# Patient Record
Sex: FEMALE | Race: BLACK OR AFRICAN AMERICAN | NOT HISPANIC OR LATINO | Employment: OTHER | URBAN - METROPOLITAN AREA
[De-identification: names, ages, dates, MRNs, and addresses within clinical notes are randomized per-mention and may not be internally consistent; named-entity substitution may affect disease eponyms.]

---

## 2017-01-03 ENCOUNTER — TELEPHONE (OUTPATIENT)
Dept: INTERNAL MEDICINE | Facility: CLINIC | Age: 57
End: 2017-01-03

## 2017-01-05 ENCOUNTER — TELEPHONE (OUTPATIENT)
Dept: INTERNAL MEDICINE | Facility: CLINIC | Age: 57
End: 2017-01-05

## 2017-01-05 NOTE — TELEPHONE ENCOUNTER
Left  for Lucy with [facility]  requesting return call.  Also tried calling Cecy-who called on 1/3, no answer.

## 2017-01-05 NOTE — TELEPHONE ENCOUNTER
Called & spoke with Lucy in [facility].  She said per the physical therapists phone notes on 1/4, the patient was not progressing with therapy and had hit a plateau.  She says when this happens, she no longer qualifies to stay to do PT in home.  She is unaware of any OT recommendations.

## 2017-01-05 NOTE — TELEPHONE ENCOUNTER
Araceli, I got a call from pt's daughter that [facility] wants to D/C home health but they never started OT.  Could you call them and check on this.  Thanks

## 2017-01-06 ENCOUNTER — TELEPHONE (OUTPATIENT)
Dept: INTERNAL MEDICINE | Facility: CLINIC | Age: 57
End: 2017-01-06

## 2017-01-06 DIAGNOSIS — E11.9 TYPE II OR UNSPECIFIED TYPE DIABETES MELLITUS WITHOUT MENTION OF COMPLICATION, NOT STATED AS UNCONTROLLED: ICD-10-CM

## 2017-01-06 DIAGNOSIS — M51.37 DEGENERATION OF LUMBAR OR LUMBOSACRAL INTERVERTEBRAL DISC: ICD-10-CM

## 2017-01-06 RX ORDER — GABAPENTIN 100 MG/1
CAPSULE ORAL
Qty: 360 CAPSULE | Refills: 3 | Status: SHIPPED | OUTPATIENT
Start: 2017-01-06 | End: 2017-06-23 | Stop reason: SDUPTHER

## 2017-01-06 NOTE — TELEPHONE ENCOUNTER
----- Message from Alejandro Meehan sent at 1/6/2017  9:35 AM CST -----  Contact: Self/[phone]  Type: Rx    Name of medication(s): gabapentin (NEURONTIN) 100 MG capsule and hydrALAZINE (APRESOLINE) 100 MG tablet    Is this a refill? New rx?refill    Who prescribed medication?Dr. Muniz    Pharmacy Name, Phone, & Location:[facility]    Comments:Patient is calling to request a refill on medications above. Please call and advise.    Thank you!

## 2017-01-06 NOTE — TELEPHONE ENCOUNTER
Spoke with pt's daughter, Reina, who noted that her Mom's glucose was high at 431(On Levemir 12 BID).  I recommended she take 10 units of novalog which she had at home and increase her Levemir to 18 units in the AM and 14 units in the PM. 'Will ask HH to re-evaluate.  Aarceli, please call [facility] to see pt ie Diabetes and follow glucoses/accuchecks more closely.Thanks

## 2017-01-11 ENCOUNTER — HOME CARE VISIT (OUTPATIENT)
Dept: NEUROLOGY | Facility: HOSPITAL | Age: 57
End: 2017-01-11

## 2017-01-23 ENCOUNTER — TELEPHONE (OUTPATIENT)
Dept: INTERNAL MEDICINE | Facility: CLINIC | Age: 57
End: 2017-01-23

## 2017-01-23 NOTE — TELEPHONE ENCOUNTER
Patient is diabetic patient's left big toe is turning black, Reina is calling to speak with someone regarding her mom's condition

## 2017-01-23 NOTE — TELEPHONE ENCOUNTER
----- Message from Hemalatha Morris sent at 1/23/2017 12:36 PM CST -----  Contact: josee Huang [phone]  Patient is diabetic patient's left big toe is turning black, Reina is calling to speak with someone regarding her mom's condition, please call patient back and advise.    Thank you

## 2017-01-24 ENCOUNTER — PATIENT MESSAGE (OUTPATIENT)
Dept: PHYSICAL MEDICINE AND REHAB | Facility: CLINIC | Age: 57
End: 2017-01-24

## 2017-01-25 ENCOUNTER — OFFICE VISIT (OUTPATIENT)
Dept: INTERNAL MEDICINE | Facility: CLINIC | Age: 57
End: 2017-01-25
Payer: MEDICARE

## 2017-01-25 VITALS
TEMPERATURE: 98 F | HEART RATE: 66 BPM | SYSTOLIC BLOOD PRESSURE: 130 MMHG | OXYGEN SATURATION: 98 % | DIASTOLIC BLOOD PRESSURE: 70 MMHG | HEIGHT: 64 IN

## 2017-01-25 DIAGNOSIS — E11.9 TYPE II OR UNSPECIFIED TYPE DIABETES MELLITUS WITHOUT MENTION OF COMPLICATION, NOT STATED AS UNCONTROLLED: ICD-10-CM

## 2017-01-25 DIAGNOSIS — L60.0 INGROWING NAIL: Primary | ICD-10-CM

## 2017-01-25 PROCEDURE — 99499 UNLISTED E&M SERVICE: CPT | Mod: S$GLB,,, | Performed by: INTERNAL MEDICINE

## 2017-01-25 PROCEDURE — 3066F NEPHROPATHY DOC TX: CPT | Mod: S$GLB,,, | Performed by: INTERNAL MEDICINE

## 2017-01-25 PROCEDURE — 1159F MED LIST DOCD IN RCRD: CPT | Mod: S$GLB,,, | Performed by: INTERNAL MEDICINE

## 2017-01-25 PROCEDURE — 3078F DIAST BP <80 MM HG: CPT | Mod: S$GLB,,, | Performed by: INTERNAL MEDICINE

## 2017-01-25 PROCEDURE — 99999 PR PBB SHADOW E&M-EST. PATIENT-LVL V: CPT | Mod: PBBFAC,,, | Performed by: INTERNAL MEDICINE

## 2017-01-25 PROCEDURE — 3044F HG A1C LEVEL LT 7.0%: CPT | Mod: S$GLB,,, | Performed by: INTERNAL MEDICINE

## 2017-01-25 PROCEDURE — 3075F SYST BP GE 130 - 139MM HG: CPT | Mod: S$GLB,,, | Performed by: INTERNAL MEDICINE

## 2017-01-25 PROCEDURE — 99214 OFFICE O/P EST MOD 30 MIN: CPT | Mod: S$GLB,,, | Performed by: INTERNAL MEDICINE

## 2017-01-25 RX ORDER — UREA 200 MG/G
CREAM TOPICAL
Qty: 113 G | Refills: 6 | Status: ON HOLD | OUTPATIENT
Start: 2017-01-25 | End: 2017-06-22 | Stop reason: HOSPADM

## 2017-01-25 NOTE — MR AVS SNAPSHOT
[facility] [address]  [address]  [address]  [facility] [address]  Phone: [phone]                  Lucy Perez   2017 11:30 AM   Office Visit    Description:  Female : 1960   Provider:  Beverly Muniz MD   Department:  [facility] [address]           Reason for Visit     Toe Pain           Diagnoses this Visit        Comments    Ingrowing nail    -  Primary     Type II or unspecified type diabetes mellitus without mention of complication, not stated as uncontrolled                To Do List           Future Appointments        Provider Department Dept Phone    2017 1:15 PM Eleni Muñoz DPM [facility] - Podiatry [phone]    2/10/2017 10:00 AM Pola Galloway MD [facility] Physical Med & Rehab [phone]    2017 12:00 PM Beverly Muniz MD [facility] [address] [phone]      Goals (5 Years of Data)     None       These Medications        Disp Refills Start End    urea 20 % Crea 113 g 6 2017     Apply cream to feet daily for dryness/diabetic health    Pharmacy: [facility] [address] [place] [address] #: [phone]         [facility] On Call     [facility] On Call Nurse Care Line -  Assistance  Registered nurses in the [facility] On Call Center provide clinical advisement, health education, appointment booking, and other advisory services.  Call for this free service at [phone].             Medications           Message regarding Medications     Verify the changes and/or additions to your medication regime listed below are the same as discussed with your clinician today.  If any of these changes or additions are incorrect, please notify your healthcare provider.        START taking these NEW medications        Refills    urea 20 % Crea 6    Sig: Apply cream to feet daily for dryness/diabetic health    Class: Normal           Verify that the below list of  medications is an accurate representation of the medications you are currently taking.  If none reported, the list may be blank. If incorrect, please contact your healthcare provider. Carry this list with you in case of emergency.           Current Medications     albuterol (PROVENTIL) 2.5 mg /3 mL (0.083 %) nebulizer solution Take 3 mLs (2.5 mg total) by nebulization every 6 (six) hours as needed for Wheezing.    albuterol 90 mcg/actuation inhaler Inhale 2 puffs into the lungs every 6 (six) hours as needed for Wheezing.    alprazolam (XANAX) 0.5 MG tablet Take 1 tablet (0.5 mg total) by mouth as directed. 1/2 to 1 tab Q8 hours as needed for anxiety    amlodipine (NORVASC) 10 MG tablet Take 1 tablet (10 mg total) by mouth once daily.    aspirin (ECOTRIN) 81 MG EC tablet Take 81 mg by mouth once daily.      atorvastatin (LIPITOR) 40 MG tablet Take 1 tablet (40 mg total) by mouth once daily. For Cholesterol    blood sugar diagnostic (ACCU-CHEK SMARTVIEW TEST STRIP) Strp 1 strip by Misc.(Non-Drug; Combo Route) route 2 (two) times daily.    carbamazepine (TEGRETOL) 200 mg tablet 1 tablet (200 mg total) by Per G Tube route 2 (two) times daily.    colchicine 0.6 mg tablet 1 tab daily as needed for gout flare    dantrolene (DANTRIUM) 25 MG Cap Take 1 capsule (25 mg total) by mouth 3 (three) times daily.    famotidine (PEPCID) 20 MG tablet Take 1 tablet (20 mg total) by mouth 2 (two) times daily as needed for Heartburn.    fluoxetine (PROZAC) 20 MG tablet Take 1 tablet (20 mg total) by mouth once daily.    furosemide (LASIX) 40 MG tablet Take 1 tablet (40 mg total) by mouth once daily.    gabapentin (NEURONTIN) 100 MG capsule 1 in AM; 3 caps at Bedtime    insulin detemir (LEVEMIR FLEXTOUCH) 100 unit/mL (3 mL) SubQ InPn pen 18 units in AM and 14 units in the PM for Diabetes    labetalol (NORMODYNE) 100 MG tablet 5 tablets (500 mg total) by Per G Tube route every 8 (eight) hours.    levetiracetam (KEPPRA) 500 MG Tab Take 1  "tablet (500 mg total) by mouth 2 (two) times daily.    multivitamin with iron Tab 1/day    nut.tx.gluc.intol,lac-free,soy (GLUCERNA 1.5 NOAH) Liqd 1.5 cans in AM, 1 can at Noon , 1.5cans at Dinner, and 1 can before Bed/Total 5 cans daily    sodium polystyrene (KAYEXALATE) 15 gram/60 mL Susp Take 60 mLs (15 g total) by mouth once daily. 60ml(15gm)/PEG now and repeat Q6 hours x 3 doses    urea 20 % Crea Apply cream to feet daily for dryness/diabetic health           Clinical Reference Information           Vital Signs - Last Recorded  Most recent update: 1/25/2017 12:14 PM by Anaid Gudino MA    BP Pulse Temp Ht SpO2       120/60 66 97.6 °F (36.4 °C) (Oral) 5' 4" (1.626 m) 98%       Blood Pressure          Most Recent Value    BP  120/60      Allergies as of 1/25/2017     Vicodin [Hydrocodone-acetaminophen]      Immunizations Administered on Date of Encounter - 1/25/2017     None      Orders Placed During Today's Visit      Normal Orders This Visit    Ambulatory referral to Podiatry       "

## 2017-01-26 NOTE — PROGRESS NOTES
SUBJECTIVE:  Ms. Perez is a very sweet 56-year-old female, known to myself,   who presents with her daughter, Reina, for urgent care type appointment.    CHIEF COMPLAINT:  Dark discoloration of the left great toe.    HISTORY OF PRESENT ILLNESS:  The patient presents with daughter, Reina, for   evaluation of the above.  She notes she is nervous because she has noticed that   her mom's left great toe has become darker and had been drier.  She is worried   about it, possibly being a circulatory issue versus something else.  Mother has   not really complained of much in the way of pain.  She has no trauma.  Actually,   she notes that her mom is doing much better.  Lucy is much more verbal today.    She is seated up in a straight back high back wheelchair with neck support, was   able to transfer here with a taxicab for transportation one that had a ramp.    She notes that she is working on transportation for the future.  She was   supposed to see Dr. Galloway today, but he had to cancel that appointment and her   appointment with him has been rescheduled.  Daughter, Reina, notes that  her   mom is doing much better now using the PEG tube only for medications.  She is   actually eating orally, tolerating this fine, no choking, no coughing.  Reina   notes that Accu-Cheks are much improved, usually less than 170, the lowest   Accu-Chek was noted to be 80.  When transportation has been set up, Reina is   hoping to start outpatient physical therapy.  She notes her mom is anxious to   try and ambulate more.  [facility].    PAST MEDICAL, SURGICAL, SOCIAL HISTORY:  Please see as thoroughly stated in EPIC   chart, which has been reviewed.    PHYSICAL EXAMINATION:  VITAL SIGNS:  Blood pressure 120/60, pulse 66, temperature 97.6, pulse ox 98%,   Accu-Chek today is 228, although the patient's daughter, Reina, notes she has    not taken her insulin or eating yet.  Recheck blood pressure per MD is 130/70.  GENERAL:  The patient is seated in a wheelchair.  Seems to be much more   talkative today and responsive than during last visit.  HEART:  Regular rate and rhythm.  LUNGS:  Show some fine bibasilar atelectatic crackles, but otherwise clear.  EXTREMITIES:  Left foot showing some hyperpigmented areas around the dorsal foot   and great toe, which are consistent with dyshidrosis.  No evidence of gangrene.    Foot is warm, has a strong 2+ DP pulse.  Nails do show some increased growth.    There is also, what appears to be, an ingrowing nail of the left great toe.  No   erythema or tenderness.  Superficial sensation is markedly decreased in the   left foot and the right foot.  Right foot is warm with a normal DP pulse as   well.  No evidence of vascular compromise whatsoever.    ASSESSMENT AND PLAN:  1.  Ingrowing nail, left podagra.  a.  Refer to Podiatry for further evaluation and intervention.  2.  Diabetes mellitus type 2, insulin dependent, and under improved control.  a.  Continue with the patient's present Levemir at 18 units in the a.m., 14   units in the p.m.  b.  I have given prescription for urea 20% cream to be applied to feet daily for   moisturizing purposes.  c.  Refer to podiatry for further evaluation and treatment and nail care.  3.  Essential hypertension, under excellent control on present therapy, which   includes Normodyne 500 mg t.i.d., Lasix 40 mg one a day, and amlodipine 10 mg   daily.    ADDENDUM:  Plan to see the patient back as was prior scheduled or see sooner if   needed.      FMS/HN  dd: 01/25/2017 18:31:08 (CST)  td: 01/26/2017 18:00:29 (CST)  Doc ID   #[id number]  Job ID #[id number]    CC:     This office note has been dictated.

## 2017-01-31 DIAGNOSIS — I61.9 HEMORRHAGIC CEREBROVASCULAR ACCIDENT (CVA): ICD-10-CM

## 2017-02-01 RX ORDER — LEVETIRACETAM 500 MG/1
500 TABLET ORAL 2 TIMES DAILY
Qty: 60 TABLET | Refills: 6 | Status: ON HOLD | OUTPATIENT
Start: 2017-02-01 | End: 2017-06-22

## 2017-02-01 NOTE — TELEPHONE ENCOUNTER
----- Message from Mickey Choudhury sent at 2/1/2017 12:01 PM CST -----  Contact: Reina quiros [phone]  RX request - refill or new RX.  Is this a refill or new RX:  Refill   RX name and strength: levetiracetam (KEPPRA) 500 MG   Directions:   Is this a 30 day or 90 day RX:    Pharmacy name and phone #: [facility] [phone]  Comments:

## 2017-02-02 RX ORDER — HYDRALAZINE HYDROCHLORIDE 50 MG/1
TABLET, FILM COATED ORAL
Qty: 360 TABLET | Refills: 2 | Status: SHIPPED | OUTPATIENT
Start: 2017-02-02 | End: 2017-02-22

## 2017-02-03 ENCOUNTER — TELEPHONE (OUTPATIENT)
Dept: INTERNAL MEDICINE | Facility: CLINIC | Age: 57
End: 2017-02-03

## 2017-02-03 RX ORDER — HYDRALAZINE HYDROCHLORIDE 100 MG/1
TABLET, FILM COATED ORAL
Qty: 270 TABLET | Refills: 11 | Status: SHIPPED | OUTPATIENT
Start: 2017-02-03 | End: 2017-02-22 | Stop reason: SDUPTHER

## 2017-02-03 NOTE — TELEPHONE ENCOUNTER
----- Message from Vesna Johnson sent at 2/3/2017  1:26 PM CST -----  Contact: Reina/ Daughter/ [phone]   Reina is calling to let the doctor know on Wednesday the pt blood sugar was 63 and today it was 69 in the morning. Pt has a wound on the buttock from it itching really bad. Please call and advise     Thank you

## 2017-02-07 ENCOUNTER — TELEPHONE (OUTPATIENT)
Dept: INTERNAL MEDICINE | Facility: CLINIC | Age: 57
End: 2017-02-07

## 2017-02-07 DIAGNOSIS — E11.9 TYPE II OR UNSPECIFIED TYPE DIABETES MELLITUS WITHOUT MENTION OF COMPLICATION, NOT STATED AS UNCONTROLLED: ICD-10-CM

## 2017-02-07 NOTE — TELEPHONE ENCOUNTER
----- Message from Shabnam Reeder sent at 2/7/2017  1:11 PM CST -----  Contact: Daughter/Reina/[phone] cell /[phone]  Pt's daughter said that she is calling in regards to pt is at a   Pre-op appointment and the anesthesiologist wants to know if it is okay for pt to stop taking her asprin prior to her surgery pt is scheduled to have surgery on 2/9/2016 and pt needs the okay to stop today. Please call and advise          Thank you

## 2017-02-07 NOTE — TELEPHONE ENCOUNTER
Spoke with Reina, who notes that her Mom's AM accuchecks are 50-60 and 110-130 in PM. I've recommended the Levemir be decreased to 16units in AM and 10units in the AM. She is having the cranial skull flap replaced 2/9 per [facility] Neurosurgery; there is no contraindication to stopping ASA pre-operatively.  She notes that the buttucks rash has resolved.

## 2017-02-16 ENCOUNTER — TELEPHONE (OUTPATIENT)
Dept: INTERNAL MEDICINE | Facility: CLINIC | Age: 57
End: 2017-02-16

## 2017-02-16 NOTE — TELEPHONE ENCOUNTER
Called segundo with [facility] back. Malissa answered she tried to explain to me what exactly was going on the pt. Apparently pt had a skull surgery and was in the hospital for more than 48 hours and according to [facility] they are saying that when a pt is admitted and will be in the hospital [place] 24 hours they are supposed to be notified to pt the pt on hold so they can resume home health. But that did not happen in this case. pts  is stating that they told them that because she was in the hospital longer than 48 hours that the orders for home health should be re submitted. He also stated that some one told him that an RN would be coming to their house jimenez 2/17/17. But according to [facility] they were not even aware of the skull surgery. But that it was at a [facility] facility and the pt was not sent home with any directions or steps to take following. [facility] states that they think the pt should be seen by Dr Porras and then see what are the next steps.

## 2017-02-16 NOTE — TELEPHONE ENCOUNTER
----- Message from Rocco Strong MA sent at 2/16/2017  2:24 PM CST -----  Contact: Lucy spence/[phone]  Please advise that the Pt was released from [facility] without any information and Lucy is calling to se if the Dr wants the pt to resume Home Health. Please call. Thanks!

## 2017-02-16 NOTE — TELEPHONE ENCOUNTER
Called pt daughter she said that it is okay to wait for the order for home health until after we see her mother on 2/22/17. She did state that [facility] had stated that they wanted her mother to start physical therapy. Pt daughter was unaware of who would have to place those orders. She also said that she would come by jimenez and drop off a release of information request form from [facility] so that Dr Muniz can see what all happened.

## 2017-02-22 ENCOUNTER — LAB VISIT (OUTPATIENT)
Dept: LAB | Facility: HOSPITAL | Age: 57
End: 2017-02-22
Attending: INTERNAL MEDICINE
Payer: MEDICARE

## 2017-02-22 ENCOUNTER — OFFICE VISIT (OUTPATIENT)
Dept: INTERNAL MEDICINE | Facility: CLINIC | Age: 57
End: 2017-02-22
Payer: MEDICARE

## 2017-02-22 ENCOUNTER — DOCUMENTATION ONLY (OUTPATIENT)
Dept: INTERNAL MEDICINE | Facility: CLINIC | Age: 57
End: 2017-02-22

## 2017-02-22 VITALS
DIASTOLIC BLOOD PRESSURE: 88 MMHG | HEART RATE: 60 BPM | OXYGEN SATURATION: 98 % | WEIGHT: 225 LBS | SYSTOLIC BLOOD PRESSURE: 144 MMHG | HEIGHT: 64 IN | BODY MASS INDEX: 38.41 KG/M2

## 2017-02-22 DIAGNOSIS — K29.00 ACUTE SUPERFICIAL GASTRITIS WITHOUT HEMORRHAGE: ICD-10-CM

## 2017-02-22 DIAGNOSIS — D46.4 ANEMIA, REFRACTORY: ICD-10-CM

## 2017-02-22 DIAGNOSIS — Z79.4 TYPE 2 DIABETES MELLITUS WITH STAGE 3 CHRONIC KIDNEY DISEASE, WITH LONG-TERM CURRENT USE OF INSULIN: ICD-10-CM

## 2017-02-22 DIAGNOSIS — I61.9 CEREBROVASCULAR ACCIDENT (CVA) WITH INTRACRANIAL HEMORRHAGE: ICD-10-CM

## 2017-02-22 DIAGNOSIS — E11.9 TYPE II OR UNSPECIFIED TYPE DIABETES MELLITUS WITHOUT MENTION OF COMPLICATION, NOT STATED AS UNCONTROLLED: ICD-10-CM

## 2017-02-22 DIAGNOSIS — N18.30 TYPE 2 DIABETES MELLITUS WITH STAGE 3 CHRONIC KIDNEY DISEASE, WITH LONG-TERM CURRENT USE OF INSULIN: ICD-10-CM

## 2017-02-22 DIAGNOSIS — D64.9 ANEMIA, UNSPECIFIED: ICD-10-CM

## 2017-02-22 DIAGNOSIS — E11.22 TYPE 2 DIABETES MELLITUS WITH STAGE 3 CHRONIC KIDNEY DISEASE, WITH LONG-TERM CURRENT USE OF INSULIN: ICD-10-CM

## 2017-02-22 DIAGNOSIS — D64.9 ANEMIA, UNSPECIFIED: Primary | ICD-10-CM

## 2017-02-22 DIAGNOSIS — I10 ESSENTIAL HYPERTENSION: ICD-10-CM

## 2017-02-22 LAB
ALBUMIN SERPL BCP-MCNC: 3.3 G/DL
ALP SERPL-CCNC: 98 U/L
ALT SERPL W/O P-5'-P-CCNC: 8 U/L
ANION GAP SERPL CALC-SCNC: 11 MMOL/L
AST SERPL-CCNC: 21 U/L
BASOPHILS # BLD AUTO: 0.04 K/UL
BASOPHILS NFR BLD: 0.4 %
BILIRUB SERPL-MCNC: 0.2 MG/DL
BUN SERPL-MCNC: 49 MG/DL
CALCIUM SERPL-MCNC: 9.5 MG/DL
CHLORIDE SERPL-SCNC: 103 MMOL/L
CHOLEST/HDLC SERPL: 4.1 {RATIO}
CO2 SERPL-SCNC: 23 MMOL/L
CREAT SERPL-MCNC: 1.7 MG/DL
DIFFERENTIAL METHOD: ABNORMAL
EOSINOPHIL # BLD AUTO: 0.3 K/UL
EOSINOPHIL NFR BLD: 3.5 %
ERYTHROCYTE [DISTWIDTH] IN BLOOD BY AUTOMATED COUNT: 14.9 %
EST. GFR  (AFRICAN AMERICAN): 37.8 ML/MIN/1.73 M^2
EST. GFR  (NON AFRICAN AMERICAN): 32.8 ML/MIN/1.73 M^2
FERRITIN SERPL-MCNC: 358 NG/ML
GLUCOSE SERPL-MCNC: 160 MG/DL
HCT VFR BLD AUTO: 32 %
HDL/CHOLESTEROL RATIO: 24.5 %
HDLC SERPL-MCNC: 204 MG/DL
HDLC SERPL-MCNC: 50 MG/DL
HGB BLD-MCNC: 9.9 G/DL
LDLC SERPL CALC-MCNC: 123.2 MG/DL
LYMPHOCYTES # BLD AUTO: 1.1 K/UL
LYMPHOCYTES NFR BLD: 11.6 %
MCH RBC QN AUTO: 29.6 PG
MCHC RBC AUTO-ENTMCNC: 30.9 %
MCV RBC AUTO: 96 FL
MONOCYTES # BLD AUTO: 0.6 K/UL
MONOCYTES NFR BLD: 6.3 %
NEUTROPHILS # BLD AUTO: 7.4 K/UL
NEUTROPHILS NFR BLD: 78.2 %
NONHDLC SERPL-MCNC: 154 MG/DL
PLATELET # BLD AUTO: 335 K/UL
PMV BLD AUTO: 8.7 FL
POTASSIUM SERPL-SCNC: 4.9 MMOL/L
PROT SERPL-MCNC: 7.3 G/DL
RBC # BLD AUTO: 3.34 M/UL
SODIUM SERPL-SCNC: 137 MMOL/L
TRIGL SERPL-MCNC: 154 MG/DL
WBC # BLD AUTO: 9.43 K/UL

## 2017-02-22 PROCEDURE — 99499 UNLISTED E&M SERVICE: CPT | Mod: S$GLB,,, | Performed by: INTERNAL MEDICINE

## 2017-02-22 PROCEDURE — 3079F DIAST BP 80-89 MM HG: CPT | Mod: S$GLB,,, | Performed by: INTERNAL MEDICINE

## 2017-02-22 PROCEDURE — 1160F RVW MEDS BY RX/DR IN RCRD: CPT | Mod: S$GLB,,, | Performed by: INTERNAL MEDICINE

## 2017-02-22 PROCEDURE — 36415 COLL VENOUS BLD VENIPUNCTURE: CPT | Mod: PO

## 2017-02-22 PROCEDURE — 99214 OFFICE O/P EST MOD 30 MIN: CPT | Mod: S$GLB,,, | Performed by: INTERNAL MEDICINE

## 2017-02-22 PROCEDURE — 99999 PR PBB SHADOW E&M-EST. PATIENT-LVL V: CPT | Mod: PBBFAC,,, | Performed by: INTERNAL MEDICINE

## 2017-02-22 PROCEDURE — 80061 LIPID PANEL: CPT

## 2017-02-22 PROCEDURE — 82728 ASSAY OF FERRITIN: CPT

## 2017-02-22 PROCEDURE — 80053 COMPREHEN METABOLIC PANEL: CPT

## 2017-02-22 PROCEDURE — 83036 HEMOGLOBIN GLYCOSYLATED A1C: CPT

## 2017-02-22 PROCEDURE — 3066F NEPHROPATHY DOC TX: CPT | Mod: S$GLB,,, | Performed by: INTERNAL MEDICINE

## 2017-02-22 PROCEDURE — 3077F SYST BP >= 140 MM HG: CPT | Mod: S$GLB,,, | Performed by: INTERNAL MEDICINE

## 2017-02-22 PROCEDURE — 85025 COMPLETE CBC W/AUTO DIFF WBC: CPT | Mod: PO

## 2017-02-22 PROCEDURE — 3044F HG A1C LEVEL LT 7.0%: CPT | Mod: S$GLB,,, | Performed by: INTERNAL MEDICINE

## 2017-02-22 RX ORDER — PANTOPRAZOLE SODIUM 40 MG/1
40 TABLET, DELAYED RELEASE ORAL DAILY
Qty: 30 TABLET | Refills: 0 | Status: ON HOLD
Start: 2017-02-22 | End: 2017-06-22 | Stop reason: HOSPADM

## 2017-02-22 RX ORDER — HYDRALAZINE HYDROCHLORIDE 100 MG/1
100 TABLET, FILM COATED ORAL 3 TIMES DAILY
Qty: 270 TABLET | Refills: 3 | Status: SHIPPED | OUTPATIENT
Start: 2017-02-22 | End: 2018-06-18 | Stop reason: SDUPTHER

## 2017-02-22 NOTE — PROGRESS NOTES
Records [facility]/9--2/13:  Pt was admitted for autologous cranioplasty, which she did well with. She was noted to have drop in H/H and received 2 units pRBCs as well as a GI work up. EGD/C-scope-showed +gastritis/rest was negative.  Pt was sent out on Pantoprozole 40mg BID.  In light of pt being on Pepcid daily, I have recommended ok to take Pantoprozole 40mg Qday -BID not necessary now.

## 2017-02-22 NOTE — PROGRESS NOTES
REASON FOR VISIT:  Ms. Perez is a very sweet 56-year-old female, known to   myself, who presents with her daughter, Reina, and her  for a scheduled   followup of diabetes.    HISTORY OF PRESENT ILLNESS:  Ms. Perez is actually status post admission to   [facility] on February 9th, for autologous cranioplasty.  She has   done well with the procedure.  She unfortunately was noted to have a decrease in   her hematocrit postoperatively and was transfused with 2 units of packed cells   while in the NICU.  GI was consulted.  She had an upper and lower endoscopy,   i.e., an EGD and colonoscopy to workup the anemia, which did show positive   gastritis in the stomach.  No other etiologies for the bleeding.  The patient   was started on pantoprazole 40 mg b.i.d.  She was already on Pepcid 20 mg daily,   which was continued.  [facility] was not restarted after arriving   home.  Daughter and  requested that this be reinitiated, which I am happy   to do.  They note her Accu-Cheks are doing much better.  She does occasionally   have an Accu-Chek as low as 80 in the morning and on a few episodes, they have   skipped the morning Levemir concerned that this would cause a worsening episode   of hypoglycemia.  During our last visit, we actually changed her Levemir to 16   units in the a.m. and 10 in the p.m.; however, she has been in the hospital for   about a week of this period of time.  Daughter, Reina, notes that the   Accu-Cheks of 80 in the morning are not daily, but on occasion.  No other low   Accu-Cheks through the day.  Most Accu-Cheks are running in the 110 to 130 area.    On one of the days when she had a low a.m. glucose of 80 and skipped the a.m.   Levemir, she did have a later Accu-Chek of 198.  Reina notes she still needs   to schedule a followup to Dr. Galloway, her Physical Medicine Rehabilitation   physician and Podiatry.  She had to cancel these appointments due to  her   admission.  She otherwise only needs refills on her hydralazine.  The rest of   medications are doing fine.    PAST MEDICAL, SURGICAL AND SOCIAL HISTORY:  Please see as thoroughly stated in   EPIC chart, which has been reviewed.    PHYSICAL EXAMINATION:  VITAL SIGNS:  Weight is 225 pounds, blood pressure is 144/88 and pulse is 60.  GENERAL:  The patient is seated in her high-back wheelchair, alert, appropriate,   responsive, although response is a little delayed.  HEART:  Regular rate and rhythm without arrhythmias.  LUNGS:  Clear bilaterally.  ABDOMEN:  Positive bowel sounds, soft and nontender.  EXTREMITIES:  She does have left upper and lower extremity mild swelling and   discomfort with movement with motor strength nonexistent of the left.  The   patient does have fairly good strength in the right upper extremity and right   lower extremity.  Extremities are negative for any significant pretibial edema.    Bilateral feet show ingrowing nails as had been seen prior to now at the   podagra, but no other abnormalities and no calluses, ulcerations or breakages in   the skin.    ASSESSMENT AND PLAN:  1.  Diabetes mellitus type 2, insulin-dependent with occasional low a.m.   Accu-Cheks.  A.  I have recommended if Accu-Chek in the a.m. is 80 or below, okay to give   half of the a.m. dose of Levemir, which normally is 16 units.  B.  Otherwise, recommend the patient continue on 16 units of Levemir in the a.m.   and 10 units of Levemir in the p.m.  C.  We will order lab work today to include hemoglobin A1c, chemistry and lipid   panel with phone review.  D.  Return to clinic in 3 to 4 months for followup or see sooner if needed.  2.  Essential hypertension with blood pressure acceptable on present therapy,   which includes labetalol 500 mg t.i.d., hydralazine 100 mg t.i.d., furosemide 40   mg one a day and amlodipine 10 mg one a day.  3.  Chronic anemia with acute worsening.  I feel this is more likely    multifactorial and then acutely worsened by gastritis.  A.  Recommend the patient for now continue on the Pepcid 20 mg daily and   pantoprazole 40 mg 1 a day.  B.  Recommend repeat CBC and ferritin level today with phone review.  4.  Chronic renal insufficiency.  A.  Check BMP with phone review.  5.  Status post hemorrhagic CVA with left-sided hemiparesis.  A.  We will reinitiate home health with skilled nursing evaluation, diabetic   following and physical therapy.  6.  Ingrowing toenails.  A.  I have encouraged daughter Reina to make appointment with Podiatry.  7.  Health maintenance.  A.  Followup appointment to Dr. Galloway in Physical Medicine Rehab to be   scheduled.  B.  Phone review after lab work and schedule 3 to 4 month followup at that point   or see sooner if needed.      FMS/HN  dd: 02/22/2017 16:24:23 (CST)  td: 02/23/2017 08:12:25 (CST)  Doc ID   #[id number]  Job ID #[id number]    CC:     This office note has been dictated.

## 2017-02-22 NOTE — MR AVS SNAPSHOT
[facility] [address]  [address]  [address]  [facility] [address]  Phone: [phone]                  Lucy Perez   2017 12:00 PM   Office Visit    Description:  Female : 1960   Provider:  Beverly Muniz MD   Department:  [facility] [address]           Reason for Visit     Follow-up           Diagnoses this Visit        Comments    Anemia, unspecified    -  Primary     Type 2 diabetes mellitus with stage 3 chronic kidney disease, with long-term current use of insulin         Essential hypertension         Cerebrovascular accident (CVA) with intracranial hemorrhage         Anemia, refractory                To Do List           Goals (5 Years of Data)     None      Follow-Up and Disposition     Return in about 4 months (around 2017).       These Medications        Disp Refills Start End    hydrALAZINE (APRESOLINE) 100 MG tablet 270 tablet 3 2017     Take 1 tablet (100 mg total) by mouth 3 (three) times daily. - Oral    Pharmacy: [facility] - [address] Ph #: [phone]         [facility] On Call     [facility] On Call Nurse Care Line -  Assistance  Registered nurses in the [facility] On Call Center provide clinical advisement, health education, appointment booking, and other advisory services.  Call for this free service at [phone].             Medications           Message regarding Medications     Verify the changes and/or additions to your medication regime listed below are the same as discussed with your clinician today.  If any of these changes or additions are incorrect, please notify your healthcare provider.        CHANGE how you are taking these medications     Start Taking Instead of    hydrALAZINE (APRESOLINE) 100 MG tablet hydrALAZINE (APRESOLINE) 100 MG tablet    Dosage:  Take 1 tablet (100 mg total) by mouth 3 (three) times daily. Dosage:  TAKE 1 TABLET THREE  TIMES DAILY    Reason for Change:  Reorder            Verify that the below list of medications is an accurate representation of the medications you are currently taking.  If none reported, the list may be blank. If incorrect, please contact your healthcare provider. Carry this list with you in case of emergency.           Current Medications     albuterol (PROVENTIL) 2.5 mg /3 mL (0.083 %) nebulizer solution Take 3 mLs (2.5 mg total) by nebulization every 6 (six) hours as needed for Wheezing.    albuterol 90 mcg/actuation inhaler Inhale 2 puffs into the lungs every 6 (six) hours as needed for Wheezing.    alprazolam (XANAX) 0.5 MG tablet Take 1 tablet (0.5 mg total) by mouth as directed. 1/2 to 1 tab Q8 hours as needed for anxiety    amlodipine (NORVASC) 10 MG tablet Take 1 tablet (10 mg total) by mouth once daily.    aspirin (ECOTRIN) 81 MG EC tablet Take 81 mg by mouth once daily.      atorvastatin (LIPITOR) 40 MG tablet Take 1 tablet (40 mg total) by mouth once daily. For Cholesterol    blood sugar diagnostic (ACCU-CHEK SMARTVIEW TEST STRIP) Strp 1 strip by Misc.(Non-Drug; Combo Route) route 2 (two) times daily.    carbamazepine (TEGRETOL) 200 mg tablet 1 tablet (200 mg total) by Per G Tube route 2 (two) times daily.    colchicine 0.6 mg tablet 1 tab daily as needed for gout flare    dantrolene (DANTRIUM) 25 MG Cap Take 1 capsule (25 mg total) by mouth 3 (three) times daily.    famotidine (PEPCID) 20 MG tablet Take 1 tablet (20 mg total) by mouth 2 (two) times daily as needed for Heartburn.    fluoxetine (PROZAC) 20 MG tablet Take 1 tablet (20 mg total) by mouth once daily.    furosemide (LASIX) 40 MG tablet Take 1 tablet (40 mg total) by mouth once daily.    gabapentin (NEURONTIN) 100 MG capsule 1 in AM; 3 caps at Bedtime    hydrALAZINE (APRESOLINE) 100 MG tablet Take 1 tablet (100 mg total) by mouth 3 (three) times daily.    insulin detemir (LEVEMIR FLEXTOUCH) 100 unit/mL (3 mL) SubQ InPn pen 16 units in AM  "and 10 units in the PM for Diabetes    labetalol (NORMODYNE) 100 MG tablet 5 tablets (500 mg total) by Per G Tube route every 8 (eight) hours.    levetiracetam (KEPPRA) 500 MG Tab Take 1 tablet (500 mg total) by mouth 2 (two) times daily.    multivitamin with iron Tab 1/day    nut.tx.gluc.intol,lac-free,soy (GLUCERNA 1.5 NOAH) Liqd 1.5 cans in AM, 1 can at Noon , 1.5cans at Dinner, and 1 can before Bed/Total 5 cans daily    sodium polystyrene (KAYEXALATE) 15 gram/60 mL Susp Take 60 mLs (15 g total) by mouth once daily. 60ml(15gm)/PEG now and repeat Q6 hours x 3 doses    urea 20 % Crea Apply cream to feet daily for dryness/diabetic health           Clinical Reference Information           Your Vitals Were     BP Pulse Height Weight SpO2 BMI    144/88 60 5' 4" (1.626 m) 102.1 kg (225 lb) 98% 38.62 kg/m2      Blood Pressure          Most Recent Value    BP  (!)  144/88      Allergies as of 2/22/2017     Vicodin [Hydrocodone-acetaminophen]      Immunizations Administered on Date of Encounter - 2/22/2017     None      Orders Placed During Today's Visit      Normal Orders This Visit    Ambulatory referral to Home Health     Future Labs/Procedures Expected by Expires    CBC auto differential  2/22/2017 2/22/2018    Comprehensive metabolic panel  2/22/2017 2/22/2018    Ferritin  2/22/2017 5/23/2017    Hemoglobin A1c  2/22/2017 2/22/2018    Lipid panel  2/22/2017 2/22/2018      Language Assistance Services     ATTENTION: Language assistance services are available, free of charge. Please call [phone].      ATENCIÓN: Si wula mingo, tiene a mills disposición servicios gratuitos de asistencia lingüística. Llame al [phone].     CHÚ Ý: N?u b?n nói Ti?ng Vi?t, có các d?ch v? h? tr? ngôn ng? mi?n phí dành cho b?n. G?i s? [phone].         [facility] [address] complies with applicable Federal civil rights laws and does not discriminate on the basis of race, color, national origin, age, disability, or " sex.

## 2017-02-23 LAB
ESTIMATED AVG GLUCOSE: 114 MG/DL
HBA1C MFR BLD HPLC: 5.6 %

## 2017-03-02 ENCOUNTER — HOME CARE VISIT (OUTPATIENT)
Dept: NEUROLOGY | Facility: HOSPITAL | Age: 57
End: 2017-03-02

## 2017-03-06 ENCOUNTER — TELEPHONE (OUTPATIENT)
Dept: INTERNAL MEDICINE | Facility: CLINIC | Age: 57
End: 2017-03-06

## 2017-03-06 NOTE — TELEPHONE ENCOUNTER
[employer] denied Medical Coverage for Home Health Services Dates of Service: 1-25-17 - 3-25-17.    Denied request:  Home health services are covered if you are homebound and require:    1.  A nurse to evaluate, observe or manage your care or     2.  A skilled therapist (such as a physical therapist, occupational therapist or speech therapist)to do therapy.

## 2017-03-07 NOTE — TELEPHONE ENCOUNTER
Spoke with Reina ruiz her Mom's Home Health reimbursement denial by [employer]. We will reconsult [facility] down the road when pt has  new issues that need to be addressed and would be covered by her [employer]/Medicare.

## 2017-03-14 ENCOUNTER — TELEPHONE (OUTPATIENT)
Dept: INTERNAL MEDICINE | Facility: CLINIC | Age: 57
End: 2017-03-14

## 2017-03-14 NOTE — TELEPHONE ENCOUNTER
Called and spoke with patient daughter, she stated she noticed since Monday night that a her mothers eye has been having drainage of clear fluid, no pain, red in eye itself, stated has not used anything in eye.

## 2017-03-14 NOTE — TELEPHONE ENCOUNTER
----- Message from Rambo Burnett sent at 3/14/2017  1:52 PM CDT -----  Contact: Reina Daughter [phone]  Daughter called and stated that Left eye is red draining and swollen on bottom lid, advice    Thanks

## 2017-03-14 NOTE — TELEPHONE ENCOUNTER
Cecy, please call Reina, pt's daughter and let her know that it sounds like her mom has allergic conjunctivitis.  She can purchase Naphcon A eye drops over the counter and apply as directed. If her mom is having itching/sneezing, she may try Claritin 10mg at 1/day as well.  If this doesn't help, she should call back. Thanks

## 2017-03-19 VITALS
DIASTOLIC BLOOD PRESSURE: 88 MMHG | RESPIRATION RATE: 20 BRPM | HEART RATE: 70 BPM | SYSTOLIC BLOOD PRESSURE: 142 MMHG | OXYGEN SATURATION: 93 %

## 2017-03-19 NOTE — PROGRESS NOTES
Asleep but aroused by verbal stimuli.  Lives w/ spouse who was present and engaged in visit.  Bedbound.  NIHSS-16; Frenchay-0; Barthel-0/20; PHQ-1; ESS-2; does not smoke; refrains from salty and fast food, nor is salt added to her food; compliant w/ all medications; no exercise regimen in place; consistently monitors and logs blood sugar and blood pressure.  Education provided to spouse on goal of Stroke Mobile, s/s of stroke, diet, PROM exercises, medications, adequate and proper nutrition via peg as well as oral feeding.  RN reached out to RTA for f/u regarding LIFT services which will be initiated soon. Verbalized understanding of all instructions provided.  Encouraged to utilize stroke team for any concerns.

## 2017-03-29 ENCOUNTER — TELEPHONE (OUTPATIENT)
Dept: INTERNAL MEDICINE | Facility: CLINIC | Age: 57
End: 2017-03-29

## 2017-03-29 ENCOUNTER — OFFICE VISIT (OUTPATIENT)
Dept: PODIATRY | Facility: CLINIC | Age: 57
End: 2017-03-29
Payer: MEDICARE

## 2017-03-29 VITALS
DIASTOLIC BLOOD PRESSURE: 68 MMHG | BODY MASS INDEX: 38.41 KG/M2 | RESPIRATION RATE: 18 BRPM | SYSTOLIC BLOOD PRESSURE: 124 MMHG | WEIGHT: 225 LBS | HEART RATE: 65 BPM | HEIGHT: 64 IN

## 2017-03-29 DIAGNOSIS — H10.029 OTHER MUCOPURULENT CONJUNCTIVITIS, UNSPECIFIED LATERALITY: Primary | ICD-10-CM

## 2017-03-29 DIAGNOSIS — R21 RASH: ICD-10-CM

## 2017-03-29 DIAGNOSIS — E11.49 TYPE II DIABETES MELLITUS WITH NEUROLOGICAL MANIFESTATIONS: Primary | ICD-10-CM

## 2017-03-29 DIAGNOSIS — L60.9 DISEASE OF NAIL: ICD-10-CM

## 2017-03-29 PROCEDURE — 1160F RVW MEDS BY RX/DR IN RCRD: CPT | Mod: S$GLB,,, | Performed by: PODIATRIST

## 2017-03-29 PROCEDURE — 99203 OFFICE O/P NEW LOW 30 MIN: CPT | Mod: 25,S$GLB,, | Performed by: PODIATRIST

## 2017-03-29 PROCEDURE — 99999 PR PBB SHADOW E&M-EST. PATIENT-LVL IV: CPT | Mod: PBBFAC,,, | Performed by: PODIATRIST

## 2017-03-29 PROCEDURE — 3066F NEPHROPATHY DOC TX: CPT | Mod: S$GLB,,, | Performed by: PODIATRIST

## 2017-03-29 PROCEDURE — 3044F HG A1C LEVEL LT 7.0%: CPT | Mod: S$GLB,,, | Performed by: PODIATRIST

## 2017-03-29 PROCEDURE — 99499 UNLISTED E&M SERVICE: CPT | Mod: S$GLB,,, | Performed by: PODIATRIST

## 2017-03-29 PROCEDURE — 11721 DEBRIDE NAIL 6 OR MORE: CPT | Mod: Q9,S$GLB,, | Performed by: PODIATRIST

## 2017-03-29 PROCEDURE — 3078F DIAST BP <80 MM HG: CPT | Mod: S$GLB,,, | Performed by: PODIATRIST

## 2017-03-29 PROCEDURE — 3074F SYST BP LT 130 MM HG: CPT | Mod: S$GLB,,, | Performed by: PODIATRIST

## 2017-03-29 RX ORDER — CIPROFLOXACIN HYDROCHLORIDE 3 MG/ML
SOLUTION/ DROPS OPHTHALMIC
Qty: 5 ML | Refills: 0 | Status: ON HOLD | OUTPATIENT
Start: 2017-03-29 | End: 2017-06-22 | Stop reason: HOSPADM

## 2017-03-29 RX ORDER — CLOTRIMAZOLE AND BETAMETHASONE DIPROPIONATE 10; .64 MG/G; MG/G
CREAM TOPICAL 2 TIMES DAILY
Qty: 45 G | Refills: 0 | Status: SHIPPED | OUTPATIENT
Start: 2017-03-29 | End: 2017-06-06 | Stop reason: SDUPTHER

## 2017-03-29 NOTE — TELEPHONE ENCOUNTER
Cecy, please call and let Reina know that I erx'd into her mom's [facility] on  [address]: Ciprofloxacin eye drops at 1-2 drops Q4-6 hours x 3-5 days and Lotrisone cream to be used on buttocks rash 1-2x/day.  If these don't clear up the eye and the rash, we'll need to get her in for an Appt.  Thanks

## 2017-03-29 NOTE — LETTER
March 30, 2017      Beverly Muniz MD  [address]  [facility] [address]           [facility]  [address]  [facility] [address]  Phone: [phone]          Patient: Lucy Perez   MR Number: [id number]   YOB: 1960   Date of Visit: 3/29/2017       Dear Dr. Beverly Muniz:    Thank you for referring Lucy Perez to me for evaluation. Attached you will find relevant portions of my assessment and plan of care.    If you have questions, please do not hesitate to call me. I look forward to following Lucy Perez along with you.    Sincerely,    Fior Ricks DPM    Enclosure  CC:  No Recipients    If you would like to receive this communication electronically, please contact [email or web address] or [phone] to request more information on [email or web address] Link access.    For providers and/or their staff who would like to refer a patient to [facility], please contact us through our one-stop-shop provider referral line, [facility], at [phone].    If you feel you have received this communication in error or would no longer like to receive these types of communications, please e-mail [email or web address]

## 2017-03-29 NOTE — TELEPHONE ENCOUNTER
Spoke with patients daughter (Reina), she states her Mother is still have some discharge from her eye the the eye drops have helped some, and that her mother has been itching a lot there is no rash present and not sores (concerned that her mother scratches until she bleeds).

## 2017-03-30 NOTE — PROGRESS NOTES
Subjective:      Patient ID: Lucy Perez is a 56 y.o. female.    Chief Complaint: PCP (Beverly Muniz MD 2/22/17); Diabetic Foot Exam; Nail Problem; and Nail Care    Lucy is a 56 y.o. female who presents to the clinic for evaluation and treatment of high risk feet. Lucy has a past medical history of Acute on chronic diastolic heart failure (11/7/2016); Anxiety; Asthma; Bipolar disorder; Bronchitis, acute; Cataract; CKD (chronic kidney disease) stage 3, GFR 30-59 ml/min; Depression; Diabetes with neurologic complications; General anesthetics causing adverse effect in therapeutic use; Hemorrhagic cerebrovascular accident (CVA); Hyperlipidemia; Hypertension; Obesity; Peripheral neuropathy; Pneumonia; Rheumatoid arthritis(714.0); Sarcoidosis; Sleep apnea; and Type II or unspecified type diabetes mellitus with renal manifestations, uncontrolled. The patient's chief complaint is long, thick toenails. This patient has documented high risk feet requiring routine maintenance secondary to diabetes mellitis and those secondary complications of diabetes, as mentioned..    PCP: Beverly Muniz MD    Date Last Seen by PCP:   Chief Complaint   Patient presents with    PCP     Beverly Muniz MD 2/22/17    Diabetic Foot Exam    Nail Problem    Nail Care       Hemoglobin A1C   Date Value Ref Range Status   02/22/2017 5.6 4.5 - 6.2 % Final     Comment:     According to ADA guidelines, hemoglobin A1C <7.0% represents  optimal control in non-pregnant diabetic patients.  Different  metrics may apply to specific populations.   Standards of Medical Care in Diabetes - 2016.  For the purpose of screening for the presence of diabetes:  <5.7%     Consistent with the absence of diabetes  5.7-6.4%  Consistent with increasing risk for diabetes   (prediabetes)  >or=6.5%  Consistent with diabetes  Currently no consensus exists for use of hemoglobin A1C  for diagnosis of diabetes for children.     11/28/2016 5.7 4.5 - 6.2  % Final     Comment:     According to ADA guidelines, hemoglobin A1C <7.0% represents  optimal control in non-pregnant diabetic patients.  Different  metrics may apply to specific populations.   Standards of Medical Care in Diabetes - 2016.  For the purpose of screening for the presence of diabetes:  <5.7%     Consistent with the absence of diabetes  5.7-6.4%  Consistent with increasing risk for diabetes   (prediabetes)  >or=6.5%  Consistent with diabetes  Currently no consensus exists for use of hemoglobin A1C  for diagnosis of diabetes for children.     11/25/2016 5.8 4.5 - 6.2 % Final     Comment:     According to ADA guidelines, hemoglobin A1C <7.0% represents  optimal control in non-pregnant diabetic patients.  Different  metrics may apply to specific populations.   Standards of Medical Care in Diabetes - 2016.  For the purpose of screening for the presence of diabetes:  <5.7%     Consistent with the absence of diabetes  5.7-6.4%  Consistent with increasing risk for diabetes   (prediabetes)  >or=6.5%  Consistent with diabetes  Currently no consensus exists for use of hemoglobin A1C  for diagnosis of diabetes for children.         Review of Systems   Constitution: Negative for chills, decreased appetite and fever.   Cardiovascular: Negative for leg swelling.   Skin: Positive for nail changes.   Musculoskeletal: Positive for back pain, muscle weakness and myalgias. Negative for arthritis, joint pain and joint swelling.   Gastrointestinal: Negative for nausea and vomiting.   Neurological: Negative for loss of balance, numbness and paresthesias.           Objective:      Physical Exam   Constitutional: She is oriented to person, place, and time. She appears well-developed and well-nourished.   Cardiovascular:   Dorsalis pedis and posterior tibial pulses are diminished Bilaterally. Toes are cool to touch. Feet are warm proximally.There is decreased digital hair. Skin is atrophic, slightly hyperpigmented, and mildly  edematous       Musculoskeletal: Normal range of motion. She exhibits no edema or tenderness.   Adequate joint range of motion without pain, limitation, nor crepitation Bilateral feet and ankle joints. Muscle strength is 5/5 in all groups bilaterally.         Neurological: She is alert and oriented to person, place, and time.   [place]-Luz 5.07 monofilamant testing is diminished Yovani feet. Sharp/dull sensation diminished Bilaterally. Light touch absent Bilaterally.       Skin: Skin is warm and dry. No ecchymosis and no lesion noted. No erythema.   Nails x10 are elongated by  4-11mm's, thickened by 2-3 mm's, dystrophic, and are darkened in  coloration . Xerosis Bilaterally. No open lesions noted.       Psychiatric: She has a normal mood and affect. Her behavior is normal.             Assessment:       Encounter Diagnoses   Name Primary?    Type II diabetes mellitus with neurological manifestations Yes    Disease of nail          Plan:       Lucy was seen today for pcp, diabetic foot exam, nail problem and nail care.    Diagnoses and all orders for this visit:    Type II diabetes mellitus with neurological manifestations    Disease of nail      I counseled the patient on her conditions, their implications and medical management.        - Shoe inspection. Diabetic Foot Education. Patient reminded of the importance of good nutrition and blood sugar control to help prevent podiatric complications of diabetes. Patient instructed on proper foot hygeine. We discussed wearing proper shoe gear, daily foot inspections, never walking without protective shoe gear, never putting sharp instruments to feet, routine podiatric nail visits every 2-3 months.      - With patient's permission, nails were aggressively reduced and debrided x 10 to their soft tissue attachment mechanically and with electric , removing all offending nail and debris. Patient relates relief following the procedure. She will continue to monitor  the areas daily, inspect her feet, wear protective shoe gear when ambulatory, moisturizer to maintain skin integrity and follow in this office in approximately 2-3 months, sooner p.r.n.

## 2017-04-21 DIAGNOSIS — I69.354 HEMIPARESIS AFFECTING LEFT SIDE AS LATE EFFECT OF CEREBROVASCULAR ACCIDENT (CVA): Primary | ICD-10-CM

## 2017-04-24 ENCOUNTER — TELEPHONE (OUTPATIENT)
Dept: INTERNAL MEDICINE | Facility: CLINIC | Age: 57
End: 2017-04-24

## 2017-04-27 ENCOUNTER — TELEPHONE (OUTPATIENT)
Dept: INTERNAL MEDICINE | Facility: CLINIC | Age: 57
End: 2017-04-27

## 2017-04-27 NOTE — TELEPHONE ENCOUNTER
----- Message from Vesna Johnson sent at 4/27/2017 10:39 AM CDT -----  Contact: Reina/ Daughter/ [phone]   Reina is calling to know how can the pt receive the Toilet Lift Sling. Please call and advise       Thank you

## 2017-05-02 NOTE — TELEPHONE ENCOUNTER
i spoke with [facility] and they said that the lift sling is a retail item and that the insurance will not cover it. i called the daughter back and informed her of this

## 2017-05-04 ENCOUNTER — HOME CARE VISIT (OUTPATIENT)
Dept: NEUROLOGY | Facility: HOSPITAL | Age: 57
End: 2017-05-04

## 2017-05-11 ENCOUNTER — TELEPHONE (OUTPATIENT)
Dept: PHYSICAL MEDICINE AND REHAB | Facility: HOSPITAL | Age: 57
End: 2017-05-11

## 2017-05-11 DIAGNOSIS — I61.9 HEMORRHAGIC CEREBROVASCULAR ACCIDENT (CVA): ICD-10-CM

## 2017-05-11 RX ORDER — FAMOTIDINE 20 MG/1
TABLET, FILM COATED ORAL
Qty: 90 TABLET | Refills: 3 | Status: SHIPPED | OUTPATIENT
Start: 2017-05-11 | End: 2018-07-17 | Stop reason: SDUPTHER

## 2017-05-11 NOTE — TELEPHONE ENCOUNTER
----- Message from Renay Berkowitz MA sent at 5/11/2017 11:20 AM CDT -----  She is taking the dantrolene. Doing the home exercises but she is still stiff and in pain. Wants to know if what else she can do.

## 2017-05-25 VITALS
HEART RATE: 76 BPM | DIASTOLIC BLOOD PRESSURE: 70 MMHG | RESPIRATION RATE: 20 BRPM | OXYGEN SATURATION: 93 % | SYSTOLIC BLOOD PRESSURE: 162 MMHG

## 2017-05-25 NOTE — PROGRESS NOTES
AAO but forgetful, slow to respond.  Bedridden--lives w/ spouse who provides 24/7 care.  Educated on goal of stroke mobile, s/s of stroke, diet, exercise, medications.  Spouse verbalized understanding of all instructions provided.  Encouraged to utilize stroke team for any concern.

## 2017-05-26 NOTE — TELEPHONE ENCOUNTER
----- Message from Rambo Burnett sent at 12/30/2016  1:52 PM CST -----  Contact: Cecy [facility] [phone]  Type: Orders Request    What orders/ testing are being requested? Verbal order to Evaluate for OC    Is there a future appointment scheduled for the patient with PCP? Yes    When?02/22/2017    Comments:Advice    Thanks   None

## 2017-05-29 RX ORDER — PEN NEEDLE, DIABETIC 31 GX5/16"
NEEDLE, DISPOSABLE MISCELLANEOUS
Qty: 150 EACH | Refills: 11 | Status: SHIPPED | OUTPATIENT
Start: 2017-05-29

## 2017-05-31 ENCOUNTER — TELEPHONE (OUTPATIENT)
Dept: INTERNAL MEDICINE | Facility: CLINIC | Age: 57
End: 2017-05-31

## 2017-05-31 ENCOUNTER — TELEPHONE (OUTPATIENT)
Dept: PHYSICAL MEDICINE AND REHAB | Facility: HOSPITAL | Age: 57
End: 2017-05-31

## 2017-05-31 DIAGNOSIS — I10 ESSENTIAL HYPERTENSION: ICD-10-CM

## 2017-05-31 DIAGNOSIS — I63.9 CEREBROVASCULAR ACCIDENT (CVA), UNSPECIFIED MECHANISM: Primary | ICD-10-CM

## 2017-05-31 DIAGNOSIS — I10 ELEVATED BLOOD PRESSURE READING WITH DIAGNOSIS OF HYPERTENSION: ICD-10-CM

## 2017-05-31 RX ORDER — DANTROLENE SODIUM 50 MG/1
50 CAPSULE ORAL 3 TIMES DAILY
Qty: 90 CAPSULE | Refills: 0 | Status: ON HOLD | OUTPATIENT
Start: 2017-05-31 | End: 2017-06-22 | Stop reason: HOSPADM

## 2017-05-31 NOTE — TELEPHONE ENCOUNTER
----- Message from Renay Berkowitz MA sent at 5/31/2017  3:34 PM CDT -----  Contact: Reina daughter [phone]  Said she had her last dose of dantrolene on Monday at 6 pm. Said the pain she is experiencing happens when she is trying to dress her or move her and have to pull her arm. She has been doing the ROM with her because she cannot get PT due to insurance. She is in pain while doing that also.     ----- Message -----  From: Renay Berkowitz MA  Sent: 5/31/2017   2:24 PM  To: Pola Galloway MD        ----- Message -----  From: Farheen Joshua  Sent: 5/31/2017   2:20 PM  To: Laverne HANSEN Staff    Reina is calling to let Dr. Galloway know that her mom has been taking the dantrolene (DANTRIUM) 25 MG Cap as he directed (2 every 8 hours for pain) and it is not helping the pain, daughter also states that mom is out of the medication and pharmacy will not fill it without authorization. Please call

## 2017-05-31 NOTE — TELEPHONE ENCOUNTER
Spoke with pt daughter. Reina states she will call back tomorrow and give pt blood pressure readings.

## 2017-05-31 NOTE — TELEPHONE ENCOUNTER
----- Message from Akbar Live sent at 5/31/2017  2:10 PM CDT -----  Contact: Reina [phone]  Patient's daughter is calling to discuss patient BP . Please advise , Thanks !

## 2017-06-01 ENCOUNTER — TELEPHONE (OUTPATIENT)
Dept: INTERNAL MEDICINE | Facility: CLINIC | Age: 57
End: 2017-06-01

## 2017-06-01 RX ORDER — LABETALOL 100 MG/1
500 TABLET, FILM COATED ORAL EVERY 8 HOURS
Qty: 1350 TABLET | Refills: 3 | Status: ON HOLD | OUTPATIENT
Start: 2017-06-01 | End: 2017-08-03

## 2017-06-01 NOTE — TELEPHONE ENCOUNTER
Jeferson, pt's BPs have been high this last week and she's been compliant with medications. It seems to have started after her dantrolene was increased.  Is it possible to go back to 25mg dose from 50mg and see if BP improves. She's on 4 BP meds at max dose. Help  Thanks, Beverly

## 2017-06-01 NOTE — TELEPHONE ENCOUNTER
Spoke with pt daughter, Reina. Pt readings are 5/25 (193/85) , 5/26(176/73), 5/28(181/93), 5/29(195/85) 5/30(184/81).    Please advise thanks.

## 2017-06-01 NOTE — TELEPHONE ENCOUNTER
Reina calls with her Mom's BPs running high over last week:176//85. Pt's dantrolene was increased last week to 50mg TID from 25mg; there have been no other medication changes.  She is compliant with Lasix 40mgQD, Hydralazine 100mg TID,Norvasc 10mgQG, and Labetolol at 500mg TID. I'm concerned that BPs could be increased 2ndary to dantrolene, although BPs may not be accurate.  I have recommended pt back to 25mg on dantrolene if ok with Dr Galloway and will have HH go back to home/check meds/BPs.  Tione, please call [facility] to go out/check BPs and fax Shower Seat form to [facility].  Thanks

## 2017-06-02 NOTE — TELEPHONE ENCOUNTER
Jeferson,  I guess its possible that her pain/spasms could be increasing her BP and not the dantrolene,but we've got her on max doses of labetalol, norvasc, hydralazine and lasix daily so I'm running out of options.  I'm trying to get home health out there to check BPs and meds.  Would you be ok with trying the baclofen instead of the dantrolene while we're working on the BP.  Thanks so much Beverly Govea

## 2017-06-02 NOTE — TELEPHONE ENCOUNTER
Spoke with Sole TABOR. States she will have someone go out to check pt BPs. Has faxed pt Shower Seat to [facility].

## 2017-06-05 ENCOUNTER — TELEPHONE (OUTPATIENT)
Dept: PHYSICAL MEDICINE AND REHAB | Facility: HOSPITAL | Age: 57
End: 2017-06-05

## 2017-06-05 RX ORDER — BACLOFEN 20 MG/1
20 TABLET ORAL 3 TIMES DAILY
Qty: 90 TABLET | Refills: 11 | Status: SHIPPED | OUTPATIENT
Start: 2017-06-05 | End: 2017-06-23 | Stop reason: SDUPTHER

## 2017-06-06 ENCOUNTER — HOSPITAL ENCOUNTER (INPATIENT)
Facility: HOSPITAL | Age: 57
LOS: 16 days | Discharge: HOME HEALTH | DRG: 100 | End: 2017-06-22
Attending: EMERGENCY MEDICINE | Admitting: PSYCHIATRY & NEUROLOGY
Payer: MEDICARE

## 2017-06-06 ENCOUNTER — TELEPHONE (OUTPATIENT)
Dept: ADMINISTRATIVE | Facility: CLINIC | Age: 57
End: 2017-06-06

## 2017-06-06 ENCOUNTER — TELEPHONE (OUTPATIENT)
Dept: INTERNAL MEDICINE | Facility: CLINIC | Age: 57
End: 2017-06-06

## 2017-06-06 DIAGNOSIS — R21 RASH: ICD-10-CM

## 2017-06-06 DIAGNOSIS — L89.159 DECUBITUS ULCER OF SACRAL REGION, UNSPECIFIED ULCER STAGE: ICD-10-CM

## 2017-06-06 DIAGNOSIS — I63.9 CEREBROVASCULAR ACCIDENT (CVA), UNSPECIFIED MECHANISM: Primary | ICD-10-CM

## 2017-06-06 DIAGNOSIS — E11.9 DIABETES MELLITUS WITHOUT COMPLICATION: ICD-10-CM

## 2017-06-06 DIAGNOSIS — M10.9 GOUT, ARTHRITIS: ICD-10-CM

## 2017-06-06 DIAGNOSIS — I61.9 HEMORRHAGIC CEREBROVASCULAR ACCIDENT (CVA): ICD-10-CM

## 2017-06-06 DIAGNOSIS — R41.82 ALTERED MENTAL STATUS, UNSPECIFIED ALTERED MENTAL STATUS TYPE: Primary | ICD-10-CM

## 2017-06-06 DIAGNOSIS — G40.909 SEIZURE DISORDER: ICD-10-CM

## 2017-06-06 DIAGNOSIS — I10 ESSENTIAL HYPERTENSION: Primary | ICD-10-CM

## 2017-06-06 DIAGNOSIS — G40.209 PARTIAL SYMPTOMATIC EPILEPSY WITH COMPLEX PARTIAL SEIZURES, NOT INTRACTABLE, WITHOUT STATUS EPILEPTICUS: ICD-10-CM

## 2017-06-06 DIAGNOSIS — T50.905A: ICD-10-CM

## 2017-06-06 PROBLEM — R47.01 APHASIA: Status: ACTIVE | Noted: 2017-06-06

## 2017-06-06 LAB
ALBUMIN SERPL BCP-MCNC: 3.6 G/DL
ALLENS TEST: ABNORMAL
ALP SERPL-CCNC: 105 U/L
ALT SERPL W/O P-5'-P-CCNC: 5 U/L
ANION GAP SERPL CALC-SCNC: 12 MMOL/L
APTT BLDCRRT: <21 SEC
AST SERPL-CCNC: 12 U/L
BASOPHILS # BLD AUTO: 0.03 K/UL
BASOPHILS NFR BLD: 0.4 %
BILIRUB SERPL-MCNC: 0.3 MG/DL
BUN SERPL-MCNC: 33 MG/DL
CALCIUM SERPL-MCNC: 9.3 MG/DL
CARBAMAZEPINE SERPL-MCNC: 6.8 UG/ML
CHLORIDE SERPL-SCNC: 110 MMOL/L
CO2 SERPL-SCNC: 20 MMOL/L
CREAT SERPL-MCNC: 1.8 MG/DL (ref 0.5–1.4)
CREAT SERPL-MCNC: 1.9 MG/DL
DELSYS: ABNORMAL
DIFFERENTIAL METHOD: ABNORMAL
EOSINOPHIL # BLD AUTO: 0.2 K/UL
EOSINOPHIL NFR BLD: 3.2 %
ERYTHROCYTE [DISTWIDTH] IN BLOOD BY AUTOMATED COUNT: 12.7 %
ERYTHROCYTE [SEDIMENTATION RATE] IN BLOOD BY WESTERGREN METHOD: 12 MM/H
EST. GFR  (AFRICAN AMERICAN): 33 ML/MIN/1.73 M^2
EST. GFR  (NON AFRICAN AMERICAN): 28.7 ML/MIN/1.73 M^2
FIO2: 21
GLUCOSE SERPL-MCNC: 173 MG/DL
HCO3 UR-SCNC: 22 MMOL/L (ref 24–28)
HCT VFR BLD AUTO: 26.1 %
HGB BLD-MCNC: 8.3 G/DL
INR PPP: 0.9
LYMPHOCYTES # BLD AUTO: 0.6 K/UL
LYMPHOCYTES NFR BLD: 8.4 %
MCH RBC QN AUTO: 31 PG
MCHC RBC AUTO-ENTMCNC: 31.8 %
MCV RBC AUTO: 97 FL
MODE: ABNORMAL
MONOCYTES # BLD AUTO: 0.4 K/UL
MONOCYTES NFR BLD: 5.1 %
NEUTROPHILS # BLD AUTO: 5.6 K/UL
NEUTROPHILS NFR BLD: 82.8 %
PCO2 BLDA: 43.5 MMHG (ref 35–45)
PH SMN: 7.31 [PH] (ref 7.35–7.45)
PLATELET # BLD AUTO: 234 K/UL
PMV BLD AUTO: 10.4 FL
PO2 BLDA: 74 MMHG (ref 80–100)
POC BE: -4 MMOL/L
POC PTINR: 1.1 (ref 0.9–1.2)
POC PTWBT: 12.7 SEC (ref 9.7–14.3)
POC SATURATED O2: 93 % (ref 95–100)
POC TCO2: 23 MMOL/L (ref 23–27)
POTASSIUM SERPL-SCNC: 4.1 MMOL/L
PROT SERPL-MCNC: 7.2 G/DL
PROTHROMBIN TIME: 9.8 SEC
RBC # BLD AUTO: 2.68 M/UL
SAMPLE: ABNORMAL
SAMPLE: ABNORMAL
SAMPLE: NORMAL
SITE: ABNORMAL
SODIUM SERPL-SCNC: 142 MMOL/L
SP02: 96
TSH SERPL DL<=0.005 MIU/L-ACNC: 1.35 UIU/ML
WBC # BLD AUTO: 6.81 K/UL

## 2017-06-06 PROCEDURE — 80053 COMPREHEN METABOLIC PANEL: CPT

## 2017-06-06 PROCEDURE — 82565 ASSAY OF CREATININE: CPT

## 2017-06-06 PROCEDURE — 96365 THER/PROPH/DIAG IV INF INIT: CPT

## 2017-06-06 PROCEDURE — 99223 1ST HOSP IP/OBS HIGH 75: CPT | Mod: ,,, | Performed by: PSYCHIATRY & NEUROLOGY

## 2017-06-06 PROCEDURE — 93005 ELECTROCARDIOGRAM TRACING: CPT

## 2017-06-06 PROCEDURE — 12000002 HC ACUTE/MED SURGE SEMI-PRIVATE ROOM

## 2017-06-06 PROCEDURE — 85025 COMPLETE CBC W/AUTO DIFF WBC: CPT

## 2017-06-06 PROCEDURE — 36600 WITHDRAWAL OF ARTERIAL BLOOD: CPT

## 2017-06-06 PROCEDURE — 99291 CRITICAL CARE FIRST HOUR: CPT | Mod: ,,, | Performed by: PSYCHIATRY & NEUROLOGY

## 2017-06-06 PROCEDURE — 80177 DRUG SCRN QUAN LEVETIRACETAM: CPT

## 2017-06-06 PROCEDURE — 85610 PROTHROMBIN TIME: CPT

## 2017-06-06 PROCEDURE — 99291 CRITICAL CARE FIRST HOUR: CPT | Mod: ,,, | Performed by: EMERGENCY MEDICINE

## 2017-06-06 PROCEDURE — 63600175 PHARM REV CODE 636 W HCPCS: Performed by: EMERGENCY MEDICINE

## 2017-06-06 PROCEDURE — 99291 CRITICAL CARE FIRST HOUR: CPT | Mod: 25

## 2017-06-06 PROCEDURE — 93010 ELECTROCARDIOGRAM REPORT: CPT | Mod: S$GLB,,, | Performed by: INTERNAL MEDICINE

## 2017-06-06 PROCEDURE — 85730 THROMBOPLASTIN TIME PARTIAL: CPT

## 2017-06-06 PROCEDURE — 99900035 HC TECH TIME PER 15 MIN (STAT)

## 2017-06-06 PROCEDURE — 84443 ASSAY THYROID STIM HORMONE: CPT

## 2017-06-06 PROCEDURE — 82962 GLUCOSE BLOOD TEST: CPT

## 2017-06-06 PROCEDURE — 82803 BLOOD GASES ANY COMBINATION: CPT

## 2017-06-06 PROCEDURE — 80156 ASSAY CARBAMAZEPINE TOTAL: CPT

## 2017-06-06 RX ORDER — HEPARIN SODIUM 5000 [USP'U]/ML
5000 INJECTION, SOLUTION INTRAVENOUS; SUBCUTANEOUS EVERY 8 HOURS
Status: DISCONTINUED | OUTPATIENT
Start: 2017-06-07 | End: 2017-06-22 | Stop reason: HOSPADM

## 2017-06-06 RX ORDER — LEVETIRACETAM 15 MG/ML
1500 INJECTION INTRAVASCULAR
Status: COMPLETED | OUTPATIENT
Start: 2017-06-06 | End: 2017-06-07

## 2017-06-06 RX ORDER — LEVETIRACETAM 15 MG/ML
1500 INJECTION INTRAVASCULAR EVERY 12 HOURS
Status: DISCONTINUED | OUTPATIENT
Start: 2017-06-06 | End: 2017-06-06 | Stop reason: SDUPTHER

## 2017-06-06 RX ORDER — SODIUM CHLORIDE 9 MG/ML
INJECTION, SOLUTION INTRAVENOUS CONTINUOUS
Status: DISCONTINUED | OUTPATIENT
Start: 2017-06-07 | End: 2017-06-08

## 2017-06-06 RX ORDER — LORAZEPAM 2 MG/ML
2 INJECTION INTRAMUSCULAR ONCE
Status: COMPLETED | OUTPATIENT
Start: 2017-06-07 | End: 2017-06-07

## 2017-06-06 RX ORDER — LISINOPRIL 10 MG/1
10 TABLET ORAL DAILY
Qty: 30 TABLET | Refills: 3 | Status: SHIPPED | OUTPATIENT
Start: 2017-06-06 | End: 2017-07-12

## 2017-06-06 RX ORDER — LISINOPRIL 10 MG/1
10 TABLET ORAL DAILY
Qty: 30 TABLET | Refills: 3 | Status: SHIPPED | OUTPATIENT
Start: 2017-06-06 | End: 2017-06-06 | Stop reason: SDUPTHER

## 2017-06-06 RX ORDER — CLOTRIMAZOLE AND BETAMETHASONE DIPROPIONATE 10; .64 MG/G; MG/G
CREAM TOPICAL 2 TIMES DAILY
Qty: 45 G | Refills: 0 | Status: SHIPPED | OUTPATIENT
Start: 2017-06-06 | End: 2017-06-23 | Stop reason: SDUPTHER

## 2017-06-06 RX ORDER — SODIUM CHLORIDE 0.9 % (FLUSH) 0.9 %
3 SYRINGE (ML) INJECTION EVERY 8 HOURS
Status: DISCONTINUED | OUTPATIENT
Start: 2017-06-07 | End: 2017-06-22 | Stop reason: HOSPADM

## 2017-06-06 RX ADMIN — LEVETIRACETAM 1500 MG: 15 INJECTION INTRAVENOUS at 11:06

## 2017-06-06 NOTE — TELEPHONE ENCOUNTER
----- Message from Shabnam Reeder sent at 6/6/2017 10:55 AM CDT -----  Contact: Daughter/Reina//[phone]  Pt's daughter  said that she is calling in regards to needing to speak with someone in the office she  stated that pt had a home health nurse come out on yesterday and suggested that pt get wound care she stated that the wound on pt's buttocks looks worse than it did on yesterday and she would like to know how can she get pt's wound treated Please call and advise            Thank you

## 2017-06-06 NOTE — TELEPHONE ENCOUNTER
----- Message from Akbar Live sent at 6/6/2017 12:21 PM CDT -----  Contact: [facility]   Type: Home Health (orders, updates, clarifications, etc.)    Home Health Agency/Nurse: Cecy     Phone number: [phone]    Reason for call:home is calling to inform MD  patient BP is 172/85     Comments: please advise , Thanks !

## 2017-06-06 NOTE — TELEPHONE ENCOUNTER
Tione, I have placed the order for pt's Drop Arm Bedside Commode for home use. Please fax to [facility]. Thanks

## 2017-06-06 NOTE — TELEPHONE ENCOUNTER
Spoke with Savanna ruiz pt's BPs running high and will add Lisinopril 10mg daily and erx in locally. I have also ok'd OT and will place order for Wound [facility] for evaluation of a ?sacral decubitus.  Anaid, pt needs a RTC ASAP with me with 1 week prior fasting lab. Anaid, I can see her 8/10 at 10AM. I will place fasting lab orders.  thanks

## 2017-06-06 NOTE — TELEPHONE ENCOUNTER
----- Message from Akbar Live sent at 6/6/2017  2:55 PM CDT -----  Contact: [facility]   Type: Home Health (orders, updates, clarifications, etc.)    Home Health Agency/Nurse: Sav[facility]     Phone number: [phone]    Reason for call: requesting a referral for wound care and order for PT .    Comments:  Please advise , Thanks !

## 2017-06-07 PROBLEM — I16.9 HYPERTENSIVE CRISIS: Status: ACTIVE | Noted: 2017-06-07

## 2017-06-07 LAB
ALBUMIN SERPL BCP-MCNC: 3.3 G/DL
ALLENS TEST: ABNORMAL
ALP SERPL-CCNC: 102 U/L
ALT SERPL W/O P-5'-P-CCNC: 6 U/L
ANION GAP SERPL CALC-SCNC: 13 MMOL/L
AST SERPL-CCNC: 16 U/L
BACTERIA #/AREA URNS AUTO: ABNORMAL /HPF
BASOPHILS # BLD AUTO: 0.02 K/UL
BASOPHILS NFR BLD: 0.4 %
BILIRUB SERPL-MCNC: 0.2 MG/DL
BILIRUB UR QL STRIP: NEGATIVE
BUN SERPL-MCNC: 35 MG/DL
CALCIUM SERPL-MCNC: 9 MG/DL
CHLORIDE SERPL-SCNC: 111 MMOL/L
CLARITY UR REFRACT.AUTO: ABNORMAL
CO2 SERPL-SCNC: 19 MMOL/L
COLOR UR AUTO: YELLOW
CREAT SERPL-MCNC: 1.9 MG/DL
DELSYS: ABNORMAL
DIFFERENTIAL METHOD: ABNORMAL
EOSINOPHIL # BLD AUTO: 0.2 K/UL
EOSINOPHIL NFR BLD: 2.9 %
ERYTHROCYTE [DISTWIDTH] IN BLOOD BY AUTOMATED COUNT: 12.6 %
ERYTHROCYTE [SEDIMENTATION RATE] IN BLOOD BY WESTERGREN METHOD: 16 MM/H
EST. GFR  (AFRICAN AMERICAN): 33 ML/MIN/1.73 M^2
EST. GFR  (NON AFRICAN AMERICAN): 28.7 ML/MIN/1.73 M^2
FIO2: 21
GLUCOSE SERPL-MCNC: 172 MG/DL
GLUCOSE UR QL STRIP: NEGATIVE
HCO3 UR-SCNC: 20.4 MMOL/L (ref 24–28)
HCT VFR BLD AUTO: 24.4 %
HGB BLD-MCNC: 8 G/DL
HGB UR QL STRIP: NEGATIVE
HYALINE CASTS UR QL AUTO: 0 /LPF
KETONES UR QL STRIP: NEGATIVE
LEUKOCYTE ESTERASE UR QL STRIP: ABNORMAL
LYMPHOCYTES # BLD AUTO: 0.5 K/UL
LYMPHOCYTES NFR BLD: 9.9 %
MAGNESIUM SERPL-MCNC: 1.6 MG/DL
MCH RBC QN AUTO: 31 PG
MCHC RBC AUTO-ENTMCNC: 32.8 %
MCV RBC AUTO: 95 FL
MICROSCOPIC COMMENT: ABNORMAL
MODE: ABNORMAL
MONOCYTES # BLD AUTO: 0.3 K/UL
MONOCYTES NFR BLD: 6.1 %
NEUTROPHILS # BLD AUTO: 4.4 K/UL
NEUTROPHILS NFR BLD: 80.5 %
NITRITE UR QL STRIP: NEGATIVE
PCO2 BLDA: 35.6 MMHG (ref 35–45)
PH SMN: 7.37 [PH] (ref 7.35–7.45)
PH UR STRIP: 5 [PH] (ref 5–8)
PHOSPHATE SERPL-MCNC: 3.6 MG/DL
PLATELET # BLD AUTO: 196 K/UL
PMV BLD AUTO: 9.2 FL
PO2 BLDA: 94 MMHG (ref 80–100)
POC BE: -5 MMOL/L
POC SATURATED O2: 97 % (ref 95–100)
POC TCO2: 21 MMOL/L (ref 23–27)
POCT GLUCOSE: 141 MG/DL (ref 70–110)
POCT GLUCOSE: 199 MG/DL (ref 70–110)
POTASSIUM SERPL-SCNC: 3.8 MMOL/L
PROT SERPL-MCNC: 6.8 G/DL
PROT UR QL STRIP: ABNORMAL
RBC # BLD AUTO: 2.58 M/UL
RBC #/AREA URNS AUTO: 1 /HPF (ref 0–4)
SAMPLE: ABNORMAL
SITE: ABNORMAL
SODIUM SERPL-SCNC: 143 MMOL/L
SP GR UR STRIP: 1.01 (ref 1–1.03)
SP02: 96
SQUAMOUS #/AREA URNS AUTO: 2 /HPF
URN SPEC COLLECT METH UR: ABNORMAL
UROBILINOGEN UR STRIP-ACNC: NEGATIVE EU/DL
WBC # BLD AUTO: 5.43 K/UL
WBC #/AREA URNS AUTO: 17 /HPF (ref 0–5)
WBC CLUMPS UR QL AUTO: ABNORMAL

## 2017-06-07 PROCEDURE — 87088 URINE BACTERIA CULTURE: CPT

## 2017-06-07 PROCEDURE — 27000221 HC OXYGEN, UP TO 24 HOURS

## 2017-06-07 PROCEDURE — 99233 SBSQ HOSP IP/OBS HIGH 50: CPT | Mod: ,,, | Performed by: PSYCHIATRY & NEUROLOGY

## 2017-06-07 PROCEDURE — 63600175 PHARM REV CODE 636 W HCPCS: Performed by: PSYCHIATRY & NEUROLOGY

## 2017-06-07 PROCEDURE — 87086 URINE CULTURE/COLONY COUNT: CPT

## 2017-06-07 PROCEDURE — 87186 SC STD MICRODIL/AGAR DIL: CPT

## 2017-06-07 PROCEDURE — 95957 EEG DIGITAL ANALYSIS: CPT

## 2017-06-07 PROCEDURE — 25000003 PHARM REV CODE 250: Performed by: PSYCHIATRY & NEUROLOGY

## 2017-06-07 PROCEDURE — 63600175 PHARM REV CODE 636 W HCPCS: Performed by: NURSE PRACTITIONER

## 2017-06-07 PROCEDURE — 82803 BLOOD GASES ANY COMBINATION: CPT

## 2017-06-07 PROCEDURE — 80053 COMPREHEN METABOLIC PANEL: CPT

## 2017-06-07 PROCEDURE — 36415 COLL VENOUS BLD VENIPUNCTURE: CPT

## 2017-06-07 PROCEDURE — 81001 URINALYSIS AUTO W/SCOPE: CPT

## 2017-06-07 PROCEDURE — 25000003 PHARM REV CODE 250: Performed by: STUDENT IN AN ORGANIZED HEALTH CARE EDUCATION/TRAINING PROGRAM

## 2017-06-07 PROCEDURE — 84100 ASSAY OF PHOSPHORUS: CPT

## 2017-06-07 PROCEDURE — 37799 UNLISTED PX VASCULAR SURGERY: CPT

## 2017-06-07 PROCEDURE — 95951 HC EEG MONITORING/VIDEO RECORD: CPT

## 2017-06-07 PROCEDURE — 63600175 PHARM REV CODE 636 W HCPCS

## 2017-06-07 PROCEDURE — 63600175 PHARM REV CODE 636 W HCPCS: Performed by: STUDENT IN AN ORGANIZED HEALTH CARE EDUCATION/TRAINING PROGRAM

## 2017-06-07 PROCEDURE — 36620 INSERTION CATHETER ARTERY: CPT | Mod: ,,, | Performed by: PSYCHIATRY & NEUROLOGY

## 2017-06-07 PROCEDURE — 25000003 PHARM REV CODE 250

## 2017-06-07 PROCEDURE — 87077 CULTURE AEROBIC IDENTIFY: CPT | Mod: 59

## 2017-06-07 PROCEDURE — 36620 INSERTION CATHETER ARTERY: CPT

## 2017-06-07 PROCEDURE — 20000000 HC ICU ROOM

## 2017-06-07 PROCEDURE — 99900035 HC TECH TIME PER 15 MIN (STAT)

## 2017-06-07 PROCEDURE — 85025 COMPLETE CBC W/AUTO DIFF WBC: CPT

## 2017-06-07 PROCEDURE — 83735 ASSAY OF MAGNESIUM: CPT

## 2017-06-07 PROCEDURE — 83036 HEMOGLOBIN GLYCOSYLATED A1C: CPT

## 2017-06-07 PROCEDURE — 95951 PR EEG MONITORING/VIDEORECORD: CPT | Mod: 26,,, | Performed by: PSYCHIATRY & NEUROLOGY

## 2017-06-07 PROCEDURE — 99233 SBSQ HOSP IP/OBS HIGH 50: CPT | Mod: 25,,, | Performed by: PSYCHIATRY & NEUROLOGY

## 2017-06-07 RX ORDER — AMLODIPINE BESYLATE 10 MG/1
10 TABLET ORAL DAILY
Status: DISCONTINUED | OUTPATIENT
Start: 2017-06-07 | End: 2017-06-22 | Stop reason: HOSPADM

## 2017-06-07 RX ORDER — HYDRALAZINE HYDROCHLORIDE 50 MG/1
100 TABLET, FILM COATED ORAL EVERY 8 HOURS
Status: DISCONTINUED | OUTPATIENT
Start: 2017-06-07 | End: 2017-06-09

## 2017-06-07 RX ORDER — LEVETIRACETAM 10 MG/ML
1000 INJECTION INTRAVASCULAR EVERY 12 HOURS
Status: DISCONTINUED | OUTPATIENT
Start: 2017-06-07 | End: 2017-06-10

## 2017-06-07 RX ORDER — HYDRALAZINE HYDROCHLORIDE 20 MG/ML
10 INJECTION INTRAMUSCULAR; INTRAVENOUS ONCE
Status: COMPLETED | OUTPATIENT
Start: 2017-06-07 | End: 2017-06-07

## 2017-06-07 RX ORDER — LORAZEPAM 2 MG/ML
INJECTION INTRAMUSCULAR
Status: COMPLETED
Start: 2017-06-07 | End: 2017-06-07

## 2017-06-07 RX ORDER — CARBAMAZEPINE 100 MG/1
200 TABLET, CHEWABLE ORAL 2 TIMES DAILY
Status: DISCONTINUED | OUTPATIENT
Start: 2017-06-07 | End: 2017-06-08

## 2017-06-07 RX ORDER — NICARDIPINE HYDROCHLORIDE 0.2 MG/ML
INJECTION INTRAVENOUS
Status: COMPLETED
Start: 2017-06-07 | End: 2017-06-07

## 2017-06-07 RX ORDER — LORAZEPAM 2 MG/ML
2 INJECTION INTRAMUSCULAR ONCE
Status: COMPLETED | OUTPATIENT
Start: 2017-06-07 | End: 2017-06-07

## 2017-06-07 RX ORDER — NICARDIPINE HYDROCHLORIDE 0.2 MG/ML
2.5 INJECTION INTRAVENOUS CONTINUOUS
Status: DISCONTINUED | OUTPATIENT
Start: 2017-06-07 | End: 2017-06-09

## 2017-06-07 RX ORDER — GLUCAGON 1 MG
1 KIT INJECTION
Status: DISCONTINUED | OUTPATIENT
Start: 2017-06-07 | End: 2017-06-22 | Stop reason: HOSPADM

## 2017-06-07 RX ORDER — HYDRALAZINE HYDROCHLORIDE 20 MG/ML
INJECTION INTRAMUSCULAR; INTRAVENOUS
Status: DISPENSED
Start: 2017-06-07 | End: 2017-06-07

## 2017-06-07 RX ORDER — INSULIN ASPART 100 [IU]/ML
1-10 INJECTION, SOLUTION INTRAVENOUS; SUBCUTANEOUS EVERY 6 HOURS PRN
Status: DISCONTINUED | OUTPATIENT
Start: 2017-06-07 | End: 2017-06-22 | Stop reason: HOSPADM

## 2017-06-07 RX ORDER — LEVETIRACETAM 15 MG/ML
1500 INJECTION INTRAVASCULAR EVERY 12 HOURS
Status: DISCONTINUED | OUTPATIENT
Start: 2017-06-07 | End: 2017-06-07

## 2017-06-07 RX ADMIN — AMLODIPINE BESYLATE 10 MG: 10 TABLET ORAL at 12:06

## 2017-06-07 RX ADMIN — HEPARIN SODIUM 5000 UNITS: 5000 INJECTION, SOLUTION INTRAVENOUS; SUBCUTANEOUS at 05:06

## 2017-06-07 RX ADMIN — NICARDIPINE HYDROCHLORIDE 5 MG/HR: 0.2 INJECTION INTRAVENOUS at 12:06

## 2017-06-07 RX ADMIN — HYDRALAZINE HYDROCHLORIDE 100 MG: 50 TABLET ORAL at 09:06

## 2017-06-07 RX ADMIN — INSULIN DETEMIR 10 UNITS: 100 INJECTION, SOLUTION SUBCUTANEOUS at 08:06

## 2017-06-07 RX ADMIN — SODIUM CHLORIDE: 0.9 INJECTION, SOLUTION INTRAVENOUS at 12:06

## 2017-06-07 RX ADMIN — Medication 3 ML: at 01:06

## 2017-06-07 RX ADMIN — HEPARIN SODIUM 5000 UNITS: 5000 INJECTION, SOLUTION INTRAVENOUS; SUBCUTANEOUS at 01:06

## 2017-06-07 RX ADMIN — LORAZEPAM 2 MG: 2 INJECTION INTRAMUSCULAR at 12:06

## 2017-06-07 RX ADMIN — HEPARIN SODIUM 5000 UNITS: 5000 INJECTION, SOLUTION INTRAVENOUS; SUBCUTANEOUS at 09:06

## 2017-06-07 RX ADMIN — LEVETIRACETAM 1500 MG: 1500 INJECTION, SOLUTION INTRAVENOUS at 08:06

## 2017-06-07 RX ADMIN — CEFTRIAXONE 1 G: 1 INJECTION, SOLUTION INTRAVENOUS at 12:06

## 2017-06-07 RX ADMIN — LABETALOL HCL 300 MG: 100 TABLET, FILM COATED ORAL at 09:06

## 2017-06-07 RX ADMIN — NICARDIPINE HYDROCHLORIDE 5 MG/HR: 0.2 INJECTION, SOLUTION INTRAVENOUS at 12:06

## 2017-06-07 RX ADMIN — LORAZEPAM 2 MG: 2 INJECTION INTRAMUSCULAR; INTRAVENOUS at 12:06

## 2017-06-07 RX ADMIN — INSULIN ASPART 2 UNITS: 100 INJECTION, SOLUTION INTRAVENOUS; SUBCUTANEOUS at 08:06

## 2017-06-07 RX ADMIN — LABETALOL HCL 300 MG: 100 TABLET, FILM COATED ORAL at 01:06

## 2017-06-07 RX ADMIN — HYDRALAZINE HYDROCHLORIDE 10 MG: 20 INJECTION INTRAMUSCULAR; INTRAVENOUS at 12:06

## 2017-06-07 RX ADMIN — LEVETIRACETAM 1000 MG: 10 INJECTION INTRAVENOUS at 08:06

## 2017-06-07 RX ADMIN — Medication 3 ML: at 05:06

## 2017-06-07 RX ADMIN — Medication 3 ML: at 09:06

## 2017-06-07 RX ADMIN — HYDRALAZINE HYDROCHLORIDE 100 MG: 50 TABLET ORAL at 01:06

## 2017-06-07 RX ADMIN — CARBAMAZEPINE 200 MG: 100 TABLET, CHEWABLE ORAL at 09:06

## 2017-06-07 RX ADMIN — CARBAMAZEPINE 200 MG: 100 TABLET, CHEWABLE ORAL at 12:06

## 2017-06-07 NOTE — HOSPITAL COURSE
Lucy Perez is a 56 y.o. female with a PMHx of HTN, DM II, HLD, sarcodiosis, RA, CKD, and previous stroke (LSW) who presented with aphasia, staring, and clinched mouth. Patient was admitted to [facility] for potential respiratory issues and possible NCSE.    06/07/17 Patient has not had MRI. Treated with keppra and ativan on arrival. Evidence of seizure activity on EEG overnight. Plans for continuous EEG monitoring.    06/08/17 patient neurologically stable, remains nonverbal and not following commands, would recommend repeat head CT if unable to get MRI    6/10/17 - patient more alert during afternoon rounds, family at bedside reporting very little to no verbal output. Hooked up to EEG. Transfused today 1 unit prbc     06/12/17 MRI ordered, patient neurologically improved

## 2017-06-07 NOTE — PLAN OF CARE
Problem: Patient Care Overview  Goal: Plan of Care Review  Outcome: Ongoing (interventions implemented as appropriate)  POC reviewed with Lucy Perez and Lucy Perez's family at bedside. Pt unable to verbalize or demonstrate understanding. Daughter @bedside and verbalizes understanding. Questions and concerns addressed. No acute events overnight. See flowsheets for assessments and VS. Pt progressing towards goals. Will continue to monitor.

## 2017-06-07 NOTE — PROCEDURES
"Lucy Perez is a 56 y.o. female patient.    Temp: 97.6 °F (36.4 °C) (06/07/17 0019)  Pulse: 67 (06/07/17 0020)  Resp: (!) 43 (06/07/17 0020)  BP: (!) 232/105 (06/07/17 0020)  SpO2: 96 % (06/07/17 0020)  Weight: 102.1 kg (225 lb) (06/06/17 2000)  Height: 5' 4" (162.6 cm) (06/06/17 2000)       Arterial Line  Date/Time: 6/7/2017 1:02 AM  Performed by: EDITA BERRY  Authorized by: EDITA BERRY   Consent Done: Emergent Situation  Indications: multiple ABGs and hemodynamic monitoring  Location: left radial  Patient sedated: no (lethargic)  Davidson's test normal: yes  Seldinger technique: Seldinger technique used  Number of attempts: 1  Complications: No  Post-procedure: line sutured and dressing applied  Post-procedure CMS: normal  Patient tolerance: Patient tolerated the procedure well with no immediate complications          Edita Berry  6/7/2017  "

## 2017-06-07 NOTE — PLAN OF CARE
[facility] - [place], [place] - [address]  [address] [facility] [address]  Phone: [phone] Fax: [phone]    [facility] - [place], [place] - [address]  [address] [address]  Phone: [phone] Fax: [phone]    This CM spoke with patient son at bedside, was informed by patient son that patient daughter(Reina)  is POA.       06/07/17 1043   Discharge Assessment   Assessment Type Discharge Planning Assessment   Confirmed/corrected address and phone number on facesheet? Yes   Assessment information obtained from? Caregiver   Expected Length of Stay (days) 3   Communicated expected length of stay with patient/caregiver yes   Prior to hospitilization cognitive status: Alert/Oriented   Prior to hospitalization functional status: Assistive Equipment;Needs Assistance   Current cognitive status: Unable to Assess   Current Functional Status: Needs Assistance   Arrived From admitted as an inpatient   Lives With spouse   Able to Return to Prior Arrangements yes   Is patient able to care for self after discharge? Unable to determine at this time (comments)   How many people do you have in your home that can help with your care after discharge? 3   Who are your caregiver(s) and their phone number(s)? (patient spouse Wilder Perez [phone], daughter Reina Perez [phone] & son Wilder Perez [phone])   Patient's perception of discharge disposition other (comments)  (kriss)   Readmission Within The Last 30 Days no previous admission in last 30 days   Patient currently being followed by outpatient case management? No   Patient currently receives home health services? No   Does the patient currently use HME? No   Patient currently receives private duty nursing? No   Patient currently receives any other outside agency services? No   Equipment Currently Used at Home wheelchair;hospital bed;bedside commode    Do you have any problems affording any of your prescribed medications? No   Is the patient taking medications as prescribed? yes   Do you have any financial concerns preventing you from receiving the healthcare you need? No   Does the patient have transportation to healthcare appointments? Yes   Transportation Available family or friend will provide   On Dialysis? No   Does the patient receive services at the Coumadin Clinic? No   Are there any open cases? No   Discharge Plan A Home;Home Health   Discharge Plan B Rehab   Patient/Family In Agreement With Plan yes       Candice Baker CM  [id number]

## 2017-06-07 NOTE — PROGRESS NOTES
"[facility]  Neurocritical Care  Progress Note    Admit Date: 6/6/2017  Service Date: 06/07/2017  Length of Stay: 1    Subjective:     Chief Complaint: <principal problem not specified>    History of Present Illness: Lucy Perez is a 57 yo F with PMH of CVA in August and November with residual left sided weakness, CHF, Dm, HLD, HTN, sarcoid, Ra, CKD 3 presenting with aphasia, unresponsiveness and poor motor effort since 9 AM today. The patient's family reports that the patient has had an elevated BP to the 180s for the past week. They report that at times the patient "will be sleepy" and poorly responsive and usually improves within a few hours. However,  At about 2pm she was found to be staring up the ceiling and remained unresponsive.  The family attempted to feed the patient at lunch through her PEG tube but the patient vomited shortly thereafter.   Her baseline is described as dependent for activities of daily living, verbal and interactive with limited mobility due to left hemiparesis.   She had hemicranectomy for stroke treatment in 2016. She is currently on keppra and tegretol and per family was started post stroke for seizure prophylaxis. No history of seizure disorder per patient's daughter.    Hospital Course: 6/7: Continued somnolence and altered mental status, F/U EEG with epilepsy due to concern for NCSE    Interval History:    Vital signs stable overnight. Will resume home blood pressure medications and long acting insulin with sliding scale. Pt continues to be lethargic. It was explained to the family that we will also be treating her for a UTI with ceftriaxone.    Review of Systems   Unable to perform ROS: Mental status change     Objective:     Vitals:  Temp: 98.4 °F (36.9 °C) (06/07/17 1705)  Pulse: 67 (06/07/17 1705)  Resp: 10 (06/07/17 1705)  BP: (!) 140/64 (06/07/17 1605)  SpO2: 99 % (06/07/17 1705)    Temp:  [94.1 °F (34.5 °C)-99 °F (37.2 °C)] 98.4 °F (36.9 °C)  Pulse:  " [57-68] 67  Resp:  [10-43] 10  SpO2:  [95 %-99 %] 99 %  BP: (134-232)/() 140/64  Arterial Line BP: (135-181)/(52-72) 165/63              06/06 0701 - 06/07 0700  In: 451.7 [I.V.:451.7]  Out: -     Physical Exam     GA: lethargic, comfortable, no acute distress.   HEENT: No scleral icterus or JVD.   Pulmonary: Clear to auscultation A/P/L. No wheezing, crackles, or rhonchi.  Cardiac: RRR S1 & S2 w/o rubs/murmurs/gallops.   Abdominal: Bowel sounds present x 4. No appreciable hepatosplenomegaly.  Skin: No jaundice, rashes, or visible lesions.  Neuro:  --Mental Status:  Lethargic, eye opening to noxious stimuli, does not follow commands. Left gaze preference  --Pupils 3mm, PERRL.   --Corneal reflex, gag, cough intact.  Left facial nerve palsy, UMN pattern.   Withdraws to plain in the RUE and RLE   Left ue and le spasticity and hemiparesis    Raul Coma Scale    Medications:  Continuous  sodium chloride 0.9% Last Rate: 75 mL/hr at 06/07/17 1705   nicardipine Last Rate: Stopped (06/07/17 0500)   Scheduled  amlodipine 10 mg Daily   carbamazepine 200 mg BID   cefTRIAXone (ROCEPHIN) IVPB 1 g Q24H   heparin (porcine) 5,000 Units Q8H   hydrALAZINE 100 mg Q8H   insulin detemir 10 Units QHS   labetalol 300 mg TID   levetiracetam IVPB 1,000 mg Q12H   sodium chloride 0.9% 3 mL Q8H   PRN  dextrose 50% 12.5 g PRN   glucagon (human recombinant) 1 mg PRN   insulin aspart 1-10 Units Q6H PRN     Today I personally reviewed pertinent medications, lines/drains/airways, imaging, lab results, microbiology results,    Assessment/Plan:     Neuro   Seizure disorder    Will continue home regimen of Keppra and Tegretol  F/U EEG given concern for NCSE and continued AMS         Renal   Type 2 diabetes mellitus with diabetic chronic kidney disease    Will restart home insulin regimen  Levemir 16 units in AM and 10 units in PM  SSI w/ accuchecks.            Prophylaxis:  Venous Thromboembolism: mechanical chemical  Stress Ulcer:  None  Ventilator Pneumonia: not applicable     Activity Orders          Activity as tolerated starting at 06/07 1158    Bedrest starting at 06/06 2111        Full Code    Patrice Duenas MD   [employer] Neurology PGY 2  Neurocritical Care  [facility]

## 2017-06-07 NOTE — HPI
56 y woman who presents with aphasia, unresponsiveness. Past R MCA stroke post hemicraniectomy, possibly complicated by Epilepsy, CHF, DM2, HTN, HLD, sarcoid, RA, CKD.

## 2017-06-07 NOTE — PROGRESS NOTES
[facility]  Vascular Neurology  Comprehensive Stroke Center  Progress Note    Neurologic Chief Complaint: aphasia    Subjective:     Interval History: Patient is seen for follow-up neurological assessment and treatment recommendations: seizures seen on EEG, neurologically unchanged    HPI, Past Medical, Family, and Social History remains the same as documented in the initial encounter.     Review of Systems   Constitutional: Negative for chills and fever.   Respiratory: Negative for cough and shortness of breath.    Gastrointestinal: Negative for vomiting.   Neurological: Positive for seizures, speech difficulty and weakness. Negative for numbness.   Psychiatric/Behavioral: Negative for agitation.     Scheduled Meds:   heparin (porcine)  5,000 Units Subcutaneous Q8H    hydrALAZINE        levetiracetam IVPB  1,500 mg Intravenous Q12H    sodium chloride 0.9%  3 mL Intravenous Q8H     Continuous Infusions:   sodium chloride 0.9% 75 mL/hr at 06/07/17 1105    nicardipine Stopped (06/07/17 0500)     PRN Meds:    Objective:     Vital Signs (Most Recent):  Temp: 99 °F (37.2 °C) (06/07/17 1105)  Pulse: 63 (06/07/17 1105)  Resp: 15 (06/07/17 1105)  BP: (!) 144/57 (06/07/17 1105)  SpO2: 99 % (06/07/17 1105)       Vital Signs Range (Last 24H):  Temp:  [94.1 °F (34.5 °C)-99 °F (37.2 °C)]   Pulse:  [57-67]   Resp:  [10-43]   BP: (134-232)/()   SpO2:  [95 %-99 %]   Arterial Line BP: (135-181)/(55-72)        Physical Exam   Constitutional: She appears well-developed and well-nourished. No distress.   HENT:   Head: Atraumatic.   Eyes: Pupils are equal, round, and reactive to light.   Cardiovascular: Normal rate.    Pulmonary/Chest: Effort normal. No respiratory distress.   Abdominal: Soft.   Skin: Skin is warm and dry.       Neurological Exam:   LOC: obtunded  Language: Mute  Speech: Mute  Orientation: Mute  Memory: Mute  Pupils (CN III, IV, VI): R pupil 1 mm, reactive Yes, brisk. L pupil 1 mm,  reactive Yes, brisk.  Facial Movement (CN VII): symmetric facial expression  Motor*: Arm Left:  Paretic:  1/5, Leg Left:   Paretic:  1/5, Arm Right:   Paretic:  2/5, Leg Right:   Paretic:  2/5  Sensation: intact to light touch, temperature and vibration    NIH Stroke Scale:    Level of Consciousness: 3 - coma  LOC Questions: 2 - answers none correctly  LOC Commands: 2 - performs neither correctly  Best Gaze: 0 - normal  Visual: 0 - no visual loss  Facial Palsy: 0 - normal  Motor Left Arm: 3 - no effort against gravity  Motor Right Arm: 3 - no effort against gravity  Motor Left Leg: 3 - no effort against gravity  Motor Right Leg: 3 - no effort against gravity  Limb Ataxia: 0 - absent  Sensory: 0 - normal  Best Language: 3 - mute  Dysarthria: 2 - near to unintelligible  Extinction and Inattention: 1 - partial neglect  NIH Stroke Scale Total: 25      Laboratory:  CMP:   Recent Labs  Lab 06/07/17  0113   CALCIUM 9.0   ALBUMIN 3.3*   PROT 6.8      K 3.8   CO2 19*   *   BUN 35*   CREATININE 1.9*   ALKPHOS 102   ALT 6*   AST 16   BILITOT 0.2     CBC:   Recent Labs  Lab 06/07/17  0113   WBC 5.43   RBC 2.58*   HGB 8.0*   HCT 24.4*      MCV 95   MCH 31.0   MCHC 32.8     Lipid Panel: No results for input(s): CHOL, LDLCALC, HDL, TRIG in the last 168 hours.  Coagulation:   Recent Labs  Lab 06/06/17 2153   INR 0.9   APTT <21.0     Platelet Aggregation Study: No results for input(s): PLTAGG, PLTAGINTERP, PLTAGREGLACO, ADPPLTAGGREG in the last 168 hours.  Hgb A1C: No results for input(s): HGBA1C in the last 168 hours.  TSH:   Recent Labs  Lab 06/06/17  2153   TSH 1.355       Diagnostic Results:  I have personally reviewed:   Findings:     CT Head. Date: 06/06/17  Interval cranioplasty in this patient with a history of right cerebral hemorrhagic infarct. No acute major territorial infarction or hemorrhage.    Left corona radiata lacunar infarct, likely remote although new since the prior exam.    Senescent  changes.    Assessment/Plan:     Lucy Perez is a 56 y.o. female with a PMHx of HTN, DM II, HLD, sarcodiosis, RA, CKD, and previous stroke (LSW) who presented with aphasia, staring, and clinched mouth. Patient was admitted to [facility] for potential respiratory issues and possible NCSE.    06/07/17 Patient has not had MRI. Treated with keppra and ativan on arrival. Evidence of seizure activity on EEG overnight. Plans for continuous EEG monitoring.    Aphasia    56 y.o. female with significant past medical history of HTN, DM II on insulin, HLD, sarcoidosis, RA, CKD III, and residual left sided weakness due to previous stroke (2016) presented to hospital complaining of aphasia.  CTH negative for acute stroke.  Symptoms likely related to seizure, patient will be admitted to [facility] due to concern for airway protection and possible status epilepticus.  Antiplatelets:  Aspirin: 81 mg oral daily  Atorvastatin- 40 mg oral daily  MRI head without contrast to assess brain parenchyma-to r/o acute stroke,   VTE Prophylaxis: Heparin 5000 units SQ every 8 hours  BP Parameters: SBP <180  Nursing Orders: Neuro checks- q1h                                    Essential hypertension    -Stroke risk factor.  Plan as above.        Hyperlipidemia    -Stroke risk factor.  Plan as above.        Seizure disorder    -seizure activity seen on EEG  -plans for continuous EEG monitoring  -Managed by [facility]          Type 2 diabetes mellitus with diabetic chronic kidney disease    -Stroke risk factor  -management per primary team                Lula Rodriguez PA-C  Comprehensive Stroke Center  Department of Vascular Neurology   [facility]

## 2017-06-07 NOTE — SUBJECTIVE & OBJECTIVE
Neurologic Chief Complaint: aphasia    Subjective:     Interval History: Patient is seen for follow-up neurological assessment and treatment recommendations: seizures seen on EEG, neurologically unchanged    HPI, Past Medical, Family, and Social History remains the same as documented in the initial encounter.     Review of Systems   Constitutional: Negative for chills and fever.   Respiratory: Negative for cough and shortness of breath.    Gastrointestinal: Negative for vomiting.   Neurological: Positive for seizures, speech difficulty and weakness. Negative for numbness.   Psychiatric/Behavioral: Negative for agitation.     Scheduled Meds:   heparin (porcine)  5,000 Units Subcutaneous Q8H    hydrALAZINE        levetiracetam IVPB  1,500 mg Intravenous Q12H    sodium chloride 0.9%  3 mL Intravenous Q8H     Continuous Infusions:   sodium chloride 0.9% 75 mL/hr at 06/07/17 1105    nicardipine Stopped (06/07/17 0500)     PRN Meds:    Objective:     Vital Signs (Most Recent):  Temp: 99 °F (37.2 °C) (06/07/17 1105)  Pulse: 63 (06/07/17 1105)  Resp: 15 (06/07/17 1105)  BP: (!) 144/57 (06/07/17 1105)  SpO2: 99 % (06/07/17 1105)       Vital Signs Range (Last 24H):  Temp:  [94.1 °F (34.5 °C)-99 °F (37.2 °C)]   Pulse:  [57-67]   Resp:  [10-43]   BP: (134-232)/()   SpO2:  [95 %-99 %]   Arterial Line BP: (135-181)/(55-72)        Physical Exam   Constitutional: She appears well-developed and well-nourished. No distress.   HENT:   Head: Atraumatic.   Eyes: Pupils are equal, round, and reactive to light.   Cardiovascular: Normal rate.    Pulmonary/Chest: Effort normal. No respiratory distress.   Abdominal: Soft.   Skin: Skin is warm and dry.       Neurological Exam:   LOC: obtunded  Language: Mute  Speech: Mute  Orientation: Mute  Memory: Mute  Pupils (CN III, IV, VI): R pupil 1 mm, reactive Yes, brisk. L pupil 1 mm, reactive Yes, brisk.  Facial Movement (CN VII): symmetric facial expression  Motor*: Arm Left:  Paretic:   1/5, Leg Left:   Paretic:  1/5, Arm Right:   Paretic:  2/5, Leg Right:   Paretic:  2/5  Sensation: intact to light touch, temperature and vibration    NIH Stroke Scale:    Level of Consciousness: 3 - coma  LOC Questions: 2 - answers none correctly  LOC Commands: 2 - performs neither correctly  Best Gaze: 0 - normal  Visual: 0 - no visual loss  Facial Palsy: 0 - normal  Motor Left Arm: 3 - no effort against gravity  Motor Right Arm: 3 - no effort against gravity  Motor Left Leg: 3 - no effort against gravity  Motor Right Leg: 3 - no effort against gravity  Limb Ataxia: 0 - absent  Sensory: 0 - normal  Best Language: 3 - mute  Dysarthria: 2 - near to unintelligible  Extinction and Inattention: 1 - partial neglect  NIH Stroke Scale Total: 25      Laboratory:  CMP:   Recent Labs  Lab 06/07/17  0113   CALCIUM 9.0   ALBUMIN 3.3*   PROT 6.8      K 3.8   CO2 19*   *   BUN 35*   CREATININE 1.9*   ALKPHOS 102   ALT 6*   AST 16   BILITOT 0.2     CBC:   Recent Labs  Lab 06/07/17  0113   WBC 5.43   RBC 2.58*   HGB 8.0*   HCT 24.4*      MCV 95   MCH 31.0   MCHC 32.8     Lipid Panel: No results for input(s): CHOL, LDLCALC, HDL, TRIG in the last 168 hours.  Coagulation:   Recent Labs  Lab 06/06/17 2153   INR 0.9   APTT <21.0     Platelet Aggregation Study: No results for input(s): PLTAGG, PLTAGINTERP, PLTAGREGLACO, ADPPLTAGGREG in the last 168 hours.  Hgb A1C: No results for input(s): HGBA1C in the last 168 hours.  TSH:   Recent Labs  Lab 06/06/17  2153   TSH 1.355       Diagnostic Results:  I have personally reviewed:   Findings:     CT Head. Date: 06/06/17  Interval cranioplasty in this patient with a history of right cerebral hemorrhagic infarct. No acute major territorial infarction or hemorrhage.    Left corona radiata lacunar infarct, likely remote although new since the prior exam.    Senescent changes.

## 2017-06-07 NOTE — PLAN OF CARE
Problem: Patient Care Overview  Goal: Plan of Care Review  Outcome: Ongoing (interventions implemented as appropriate)  POC reviewed with pt and family at 1600. Family verbalized understanding. Questions and concerns addressed. No acute events today. Pt progressing toward goals. Will continue to monitor. See flowsheets for full assessment and VS info.

## 2017-06-07 NOTE — ASSESSMENT & PLAN NOTE
Will continue home regimen of Keppra and Tegretol  F/U EEG given concern for NCSE and continued AMS

## 2017-06-07 NOTE — ED NOTES
"Pt family member states pt lives at home, is taken care of by her , was put to bed last night and then when attempting to feed pt breakfast this morning at around 0900 noticed she was acting worse than usual. Pt was aphasic which is not normal, would not eat, appeared to be "out of it." Family member states her blood pressure has been running high 180s all week and PCP was just notified of this. Concerned for another stroke. Pt currently is aphasic, not following commands, does have some purposeful movement of right arm and leg, left arm appears to be flexed, not flaccid.   "

## 2017-06-07 NOTE — ASSESSMENT & PLAN NOTE
56 y.o. female with significant past medical history of HTN, DM II on insulin, HLD, sarcoidosis, RA, CKD III, and residual left sided weakness due to previous stroke (2016) presented to hospital complaining of aphasia.  CTH negative for acute stroke.  Symptoms likely related to seizure, patient will be admitted to [facility] due to concern for airway protection and possible status epilepticus.  Antiplatelets:  Aspirin: 81 mg oral daily  Atorvastatin- 40 mg oral daily  MRI head without contrast to assess brain parenchyma-to r/o acute stroke,   VTE Prophylaxis: Heparin 5000 units SQ every 8 hours  BP Parameters: SBP <180  Nursing Orders: Neuro checks- q1h

## 2017-06-07 NOTE — PROGRESS NOTES
ICU Attending Note  Neurocritical Care    CT head old R MCA stroke post cranioplasty, new small L MCA lesion    [id number]    -cEEG  -levetiracetam and titrate based on EEG, restart carbamazepine  -eventual MR brain without contrast for new lesion in L corona radiata  --180  -restart amlodipine, labetalol, hydralazine, hold lisinopril currently  -wean nicardipine  -NS 75 mL/h  -add on urine culture  -ceftriaxone for possible UTI  -detemir 10 units tonight while NPO, add ISS  -activity as tolerated  -heparin prophylaxis  -I updated family at length at bedside and over phone.

## 2017-06-07 NOTE — SUBJECTIVE & OBJECTIVE
"     Past Medical History:   Diagnosis Date    Acute hypoxemic respiratory failure     Acute on chronic diastolic heart failure 11/7/2016    Anxiety     Asthma     Bipolar disorder     Bronchitis, acute     Cataract     CKD (chronic kidney disease) stage 3, GFR 30-59 ml/min     stage 3    Depression     Diabetes with neurologic complications     General anesthetics causing adverse effect in therapeutic use     Hemorrhagic cerebrovascular accident (CVA)     8/2016 s/p Hemicraniotomy at [facility] with Left hemiparesis    Hyperlipidemia     Hypertension     Obesity     Peripheral neuropathy     Pneumonia     Rheumatoid arthritis     Sarcoidosis     Sleep apnea     CPAP    Type II or unspecified type diabetes mellitus with renal manifestations, uncontrolled      Past Surgical History:   Procedure Laterality Date    breast reduction      BREAST SURGERY      breast reduction    COLONOSCOPY N/A 8/11/2016    Procedure: COLONOSCOPY;  Surgeon: Jerry Vilchis MD;  Location: [facility] ([address] [place] [address]);  Service: Endoscopy;  Laterality: N/A;  Patient reports difficulty awaking from anesthesia in the past.    HYSTERECTOMY      ROTATOR CUFF REPAIR Right July 9, 2014    right side    stent placed      in vertebral artery    TUBAL LIGATION       Family History   Problem Relation Age of Onset    Cancer Mother 55     Breast cancer    Diabetes Mother     Hypertension Mother     Heart disease Mother     Heart attack Mother     Stroke Sister     Hypertension Sister     Sleep apnea Sister     No Known Problems Daughter     No Known Problems Son     Bell's palsy Sister     Lupus Sister     Blindness Maternal Grandmother     Diabetes       "My entire family family has diabetes"    Stroke Maternal Aunt     Amblyopia Neg Hx     Cataracts Neg Hx     Glaucoma Neg Hx     Macular degeneration Neg Hx     Retinal detachment Neg Hx     Strabismus Neg Hx      Social History   Substance Use Topics    Smoking " "status: Never Smoker    Smokeless tobacco: Never Used    Alcohol use No      Comment: occasional wine cooler      Review of patient's allergies indicates:   Allergen Reactions    Vicodin [hydrocodone-acetaminophen] Rash     Medications: I have reviewed the current medication administration record.        Current Outpatient Prescriptions:     albuterol (PROVENTIL) 2.5 mg /3 mL (0.083 %) nebulizer solution, Take 3 mLs (2.5 mg total) by nebulization every 6 (six) hours as needed for Wheezing., Disp: 50 each, Rfl: 0    amlodipine (NORVASC) 10 MG tablet, Take 1 tablet (10 mg total) by mouth once daily., Disp: 90 tablet, Rfl: 2    aspirin (ECOTRIN) 81 MG EC tablet, Take 81 mg by mouth once daily.  , Disp: , Rfl:     atorvastatin (LIPITOR) 40 MG tablet, Take 1 tablet (40 mg total) by mouth once daily. For Cholesterol, Disp: 90 tablet, Rfl: 2    baclofen (LIORESAL) 20 MG tablet, Take 1/2 tablet (20 mg total) by mouth 3 (three) times daily., Disp: 90 tablet, Rfl: 11    BD INSULIN PEN NEEDLE UF SHORT 31 gauge x 5/16" Ndle, USE TO INJECT NOVOLOG FLEXPEN BEFORE MEALS, Disp: 150 each, Rfl: 11    blood sugar diagnostic (ACCU-CHEK SMARTVIEW TEST STRIP) Strp, 1 strip by Misc.(Non-Drug; Combo Route) route 2 (two) times daily., Disp: 300 each, Rfl: 3    carbamazepine (TEGRETOL) 200 mg tablet, 1 tablet (200 mg total) by Per G Tube route 2 (two) times daily., Disp: 60 tablet, Rfl: 11    clotrimazole-betamethasone 1-0.05% (LOTRISONE) cream, Apply topically 2 (two) times daily. Apply to area of rash/iting on buttocks 1-2x/day as needed, Disp: 45 g, Rfl: 0    colchicine 0.6 mg tablet, 1 tab daily as needed for gout flare, Disp: 30 tablet, Rfl: 1    dantrolene (DANTRIUM) 50 MG Cap, Take 1 capsule (50 mg total) by mouth 3 (three) times daily., Disp: 90 capsule, Rfl: 0    famotidine (PEPCID) 20 MG tablet, TAKE 1 TABLET (20 MG TOTAL) BY MOUTH ONCE DAILY., Disp: 90 tablet, Rfl: 3    fluoxetine (PROZAC) 20 MG tablet, Take 1 " tablet (20 mg total) by mouth once daily., Disp: 30 tablet, Rfl: 0    furosemide (LASIX) 40 MG tablet, Take 1 tablet (40 mg total) by mouth once daily., Disp: 90 tablet, Rfl: 2    gabapentin (NEURONTIN) 100 MG capsule, 1 in AM; 3 caps at Bedtime, Disp: 360 capsule, Rfl: 3    hydrALAZINE (APRESOLINE) 100 MG tablet, Take 1 tablet (100 mg total) by mouth 3 (three) times daily., Disp: 270 tablet, Rfl: 3    insulin detemir (LEVEMIR FLEXTOUCH) 100 unit/mL (3 mL) SubQ InPn pen, 16 units in AM and 10 units in the PM for Diabetes, Disp: 2 Box, Rfl: 0    labetalol (NORMODYNE) 100 MG tablet, 5 tablets (500 mg total) by Per G Tube route every 8 (eight) hours., Disp: 1350 tablet, Rfl: 3    levetiracetam (KEPPRA) 500 MG Tab, Take 1 tablet (500 mg total) by mouth 2 (two) times daily., Disp: 60 tablet, Rfl: 6    lisinopril 10 MG tablet, Take 1 tablet (10 mg total) by mouth once daily., Disp: 30 tablet, Rfl: 3    multivitamin with iron Tab, 1/day (Patient taking differently: Take 1 tablet by mouth once daily. ), Disp: 30 each, Rfl: prn    nut.tx.gluc.intol,lac-free,soy (GLUCERNA 1.5 NOAH) Liqd, 1.5 cans in AM, 1 can at Noon , 1.5cans at Dinner, and 1 can before Bed/Total 5 cans daily, Disp: 150 Can, Rfl: 0    pantoprazole (PROTONIX) 40 MG tablet, Take 1 tablet (40 mg total) by mouth once daily., Disp: 30 tablet, Rfl: 0    sodium polystyrene (KAYEXALATE) 15 gram/60 mL Susp, Take 60 mLs (15 g total) by mouth once daily. 60ml(15gm)/PEG now and repeat Q6 hours x 3 doses, Disp: 180 mL, Rfl: 0    urea 20 % Crea, Apply cream to feet daily for dryness/diabetic health, Disp: 113 g, Rfl: 6      Review of Systems   Unable to perform ROS: Patient unresponsive   Constitutional: Negative for fever.   HENT: Negative for drooling.    Eyes: Negative for discharge.   Respiratory: Negative for cough.    Gastrointestinal: Negative for vomiting.   Genitourinary: Negative for hematuria.   Skin: Positive for rash (to buttocks).    Allergic/Immunologic: Negative for environmental allergies and food allergies.   Neurological: Positive for speech difficulty. Negative for weakness (baseline left sided weakness).     Objective:     Vital Signs (Most Recent):  Temp: 98.4 °F (36.9 °C) (06/06/17 2000)  Pulse: (!) 59 (06/06/17 2103)  Resp: 18 (06/06/17 2031)  BP: (!) 173/72 (06/06/17 2102)  SpO2: 96 % (06/06/17 2103)    Vital Signs Range (Last 24H):  Temp:  [98.4 °F (36.9 °C)]   Pulse:  [59-63]   Resp:  [18-20]   BP: (146-185)/(66-80)   SpO2:  [96 %-98 %]     Physical Exam   Constitutional: She appears well-developed and well-nourished.   HENT:   Head: Normocephalic and atraumatic.   Right Ear: External ear normal.   Left Ear: External ear normal.   Nose: Nose normal.   Mouth/Throat: Oropharynx is clear and moist. No oropharyngeal exudate.   Eyes: Conjunctivae are normal. Pupils are equal, round, and reactive to light. Right eye exhibits no discharge. Left eye exhibits no discharge. No scleral icterus.   Neck: Neck supple.   Cardiovascular: Normal rate and regular rhythm.    Pulmonary/Chest: Effort normal and breath sounds normal.   Abdominal: Soft. Bowel sounds are normal. She exhibits no distension.       Musculoskeletal: She exhibits no edema.   Lymphadenopathy:     She has no cervical adenopathy.   Neurological: She is unresponsive. GCS eye subscore is 2 - to pain. GCS verbal subscore is 1 - none. GCS motor subscore is 4 - withdraws from pain.   Skin: Skin is warm and dry.   Nursing note and vitals reviewed.      Neurological Exam:   LOC: does not follow requests and comatose  EOM (CN III, IV, VI): Gaze preference left    NIH Stroke Scale:  Interval: baseline (upon arrival/admit)  Level of Consciousness: 3 - coma  LOC Questions: 2 - answers none correctly  LOC Commands: 2 - performs neither correctly  Best Gaze: 1 - partial gaze palsy  Visual: 0 - no visual loss  Facial Palsy: 0 - normal  Motor Left Arm: 4 - no movement  Motor Right Arm: 3 - no  effort against gravity  Motor Left Le - no movement  Motor Right Leg: 3 - no effort against gravity  Limb Ataxia: 0 - absent  Sensory: 0 - normal  Best Language: 3 - mute  Dysarthria: 2 - near to unintelligible  Extinction and Inattention: 1 - partial neglect  NIH Stroke Scale Total: 28  Raul Coma Scale:  Best Eye Response: 2 - to pain  Best Motor Response: 4 - withdraws from pain  Best Verbal Response: 1 - none  [place] Coma Scale Total: 7  Modified Harlan Scale:   Timeline: Prior to symptoms onset  Modified Amisha Score: 4 - moderately severe disability    [id number] Scale:   Age: 0 - < 65 years old  CHF History: 0 - no  HTN History: 1 - yes  Stroke/TIA/Thromboembolism History: 2 - yes  Vascular Disease History: 0 - no  Diabetes Mellitus in History: 1 - yes  Female: 1 - yes  [id number] Scale Total: 5      Laboratory:  CMP:   Recent Labs  Lab 17  2153   CALCIUM 9.3   ALBUMIN 3.6   PROT 7.2      K 4.1   CO2 20*      BUN 33*   CREATININE 1.9*   ALKPHOS 105   ALT 5*   AST 12   BILITOT 0.3     CBC:   Recent Labs  Lab 173   WBC 6.81   RBC 2.68*   HGB 8.3*   HCT 26.1*      MCV 97   MCH 31.0   MCHC 31.8*     Lipid Panel: No results for input(s): CHOL, LDLCALC, HDL, TRIG in the last 168 hours.  Coagulation:   Recent Labs  Lab 173   INR 0.9   APTT <21.0     Hgb A1C: No results for input(s): HGBA1C in the last 168 hours.  TSH:   Recent Labs  Lab 17  2153   TSH 1.355       Diagnostic Results:  Brain Imaging: CT Head. Date: 2017   Acute Pathology: None  Location: N/A  Old Vascular Pathology: Infarct, cranioplasty  Location: [place] radiata: left    Cerebrovascular Imaging: none    Cervical Vascular Imaging: none    Cardiac Evaluation: Trans-thoracic. Date: 10/30/16  Key Findings: Bi-atrial enlargement, left ventricular diastolic dysfunction  EKG/Telemetry: Sinus rhythm

## 2017-06-07 NOTE — HOSPITAL COURSE
6/7 admission to [facility]  6/8 R PLED-> increased carbamazepine, levetiracetam  6/9 waxing/waning R PLED but no seizure  6/12: Patient came because of SZ. R PLEDS and slowing and no ictal activity.  6/13: MRI of the brain is stable. No acute strokes. Patient had severe tigh pain. Tegretol level 10.3. Serum Na OK. Enterbacter resistant to Ceftriaxine. Antibiotics were changed to Cipro. RLE tenderness. Dermatology consultation left recommendations.  6/14:No events. On Cipro for enterobacter UTI.  BP low during the night. No BM yet,

## 2017-06-07 NOTE — ASSESSMENT & PLAN NOTE
56 y.o. female with significant past medical history of HTN, DM II on insulin, HLD, sarcoidosis, RA, CKD III, and residual left sided weakness due to previous stroke (2016) presented to hospital complaining of aphasia.  CTH negative for acute stroke.  Symptoms likely related to seizure, patient will be admitted to [facility] due to concern for airway protection and possible status epilepticus.  Antithrombotics for secondary stroke prevention: Antiplatelets:  Aspirin: 81 mg oral daily  Statins for secondary stroke prevention and hyperlipidemia, if present: Atorvastatin- 40 mg oral daily  Aggressive risk factor modification: Hypertension, Diabetes, High Cholesterol and Obesity Rehab Efforts: Physical Therapy, Occupational Therapy, Speech and Language Pathology and Physical Medicine and Rehabilitation-when patient is able to participate  Diagnostics: Ordered/Pending CT scan of head without contrast to asses brain parenchyma, HgbA1C to assess blood glucose levels, Lipid Profile to assess cholesterol levels, MRI head without contrast to assess brain parenchyma-to r/o acute stroke, Trans-thoracic cardiac echo to assess cardiac function/status, TSH to assess thyroid function  VTE Prophylaxis: Heparin 5000 units SQ every 8 hours, BP Parameters: SBP <180  Nursing Orders: Neuro checks- hourly, Complete ELIZABETH unless evaluated by speech, Seizure precautions, Out of bed BID, Avoid Bourgeois catheter, Pneumatic compression device, Stroke Education, Blood glucose target 100-130

## 2017-06-07 NOTE — ED PROVIDER NOTES
"Encounter Date: 6/6/2017    SCRIBE #1 NOTE: I, Ginette Berkowitz, am scribing for, and in the presence of,  Dr. Stern . I have scribed the following portions of the note - the EKG reading. Other sections scribed: CXR, CT .       History     Chief Complaint   Patient presents with    Aphasia     Aphasia, noticed by family at 9 this morning, last seen normal last night.      Review of patient's allergies indicates:   Allergen Reactions    Vicodin [hydrocodone-acetaminophen] Rash     Lucy Perez is a 55 yo F with PMH of CVA in August and November with residual left sided weakness, CHF, Dm, HLD, HTN, sarcoid, Ra, CKD 3 presenting with aphasia, unresponsiveness and poor motor effort since 9 AM today. The patient's family reports that the patient has had an elevated BP to the 180s for the past week. They report that at times the patient "will be sleepy" and poorly responsive and usually improves within a few hours. However, although the patient became slightly more interactive today, she declined by the afternoon. The patient has chronic yeast infection surrounding her sacrum but no fevers, chills, cough per the family. The family attempted to feed the patient at lunch through her PEG tube but the patient vomited shortly thereafter. Current NIHSS is 26.      The history is provided by a relative.     Past Medical History:   Diagnosis Date    Acute hypoxemic respiratory failure     Acute on chronic diastolic heart failure 11/7/2016    Anemia of chronic disease 6/10/2017    Anxiety     Asthma     Bipolar disorder     Bronchitis, acute     Cataract     CKD (chronic kidney disease) stage 3, GFR 30-59 ml/min     stage 3    Depression     Diabetes with neurologic complications     General anesthetics causing adverse effect in therapeutic use     Hemorrhagic cerebrovascular accident (CVA)     8/2016 s/p Hemicraniotomy at [facility] with Left hemiparesis    Hyperlipidemia     Hypertension     Obesity     " "Peripheral neuropathy     Pneumonia     Rheumatoid arthritis     Sarcoidosis     Sleep apnea     CPAP    Type II or unspecified type diabetes mellitus with renal manifestations, uncontrolled      Past Surgical History:   Procedure Laterality Date    breast reduction      BREAST SURGERY      breast reduction    COLONOSCOPY N/A 8/11/2016    Procedure: COLONOSCOPY;  Surgeon: Jerry Vilchis MD;  Location: [facility] ([address] [place] [address]);  Service: Endoscopy;  Laterality: N/A;  Patient reports difficulty awaking from anesthesia in the past.    HYSTERECTOMY      ROTATOR CUFF REPAIR Right July 9, 2014    right side    stent placed      in vertebral artery    TUBAL LIGATION       Family History   Problem Relation Age of Onset    Cancer Mother 55     Breast cancer    Diabetes Mother     Hypertension Mother     Heart disease Mother     Heart attack Mother     Stroke Sister     Hypertension Sister     Sleep apnea Sister     No Known Problems Daughter     No Known Problems Son     Bell's palsy Sister     Lupus Sister     Blindness Maternal Grandmother     Diabetes       "My entire family family has diabetes"    Stroke Maternal Aunt     Amblyopia Neg Hx     Cataracts Neg Hx     Glaucoma Neg Hx     Macular degeneration Neg Hx     Retinal detachment Neg Hx     Strabismus Neg Hx      Social History   Substance Use Topics    Smoking status: Never Smoker    Smokeless tobacco: Never Used    Alcohol use No      Comment: occasional wine cooler      Review of Systems   Unable to perform ROS: Patient nonverbal       Physical Exam     Initial Vitals [06/06/17 2000]   BP Pulse Resp Temp SpO2   (!) 146/66 62 20 98.4 °F (36.9 °C) 98 %     Physical Exam    Nursing note and vitals reviewed.  Constitutional: She appears well-developed and well-nourished.   The patient is lying in bed with eyes open, unresponsive, AOx0   HENT:   Head: Normocephalic and atraumatic.   Mouth/Throat: No oropharyngeal exudate.   Eyes: " Conjunctivae are normal. Right eye exhibits no discharge. Left eye exhibits no discharge. No scleral icterus.   See neuro exam for CN II   Neck: Normal range of motion.   Cardiovascular: Regular rhythm, normal heart sounds and intact distal pulses. Exam reveals no gallop and no friction rub.    No murmur heard.  Rick down to 55   Pulmonary/Chest:   Coarse breath sounds, inspiratory rhonchi, decreased breath sounds at bases, O2 sats at 96   Abdominal:   Soft, non-tender, non-distended, normal BS , no rebounds, guarding or masses   Musculoskeletal:   Diffuse pink skin discoloration and small pustules expanding from sacrum to rectum, no large ulcers appreciated   Neurological:   AOx0  CNs  II - patient tracks occasionally, occasional eye closure to threat  III, IV, VI - VOR intact occasionally, moves eyes on occasion, pupils 3 to 2 mm b/l, more sluggish on the left  VII - mild R sided facial droop  LLE, LUE - no movement, flacid  RUE, RLE - withdraws to pain, some spontaneous mvmt, rigidity  Babinski - upgoing b/l   Skin: Skin is warm and dry.         ED Course   Critical Care  Date/Time: 6/6/2017 11:26 PM  Performed by: STAN JAQUEZ  Authorized by: STAN JAQUEZ   Total critical care time (exclusive of procedural time) : 45 minutes  Critical care time was exclusive of separately billable procedures and treating other patients and teaching time.  Critical care was necessary to treat or prevent imminent or life-threatening deterioration of the following conditions: CNS failure or compromise.  Critical care was time spent personally by me on the following activities: discussions with consultants, evaluation of patient's response to treatment, obtaining history from patient or surrogate, ordering and review of laboratory studies, pulse oximetry, review of old charts, re-evaluation of patient's condition, ordering and review of radiographic studies, ordering and performing treatments and interventions,  examination of patient and development of treatment plan with patient or surrogate.        Labs Reviewed   COMPREHENSIVE METABOLIC PANEL - Abnormal; Notable for the following:        Result Value    CO2 20 (*)     Glucose 173 (*)     BUN, Bld 33 (*)     Creatinine 1.9 (*)     ALT 5 (*)     eGFR if  33.0 (*)     eGFR if non  28.7 (*)     All other components within normal limits   CBC W/ AUTO DIFFERENTIAL - Abnormal; Notable for the following:     RBC 2.68 (*)     Hemoglobin 8.3 (*)     Hematocrit 26.1 (*)     MCHC 31.8 (*)     Lymph # 0.6 (*)     Gran% 82.8 (*)     Lymph% 8.4 (*)     All other components within normal limits   ISTAT CREATININE - Abnormal; Notable for the following:     POC Creatinine 1.8 (*)     All other components within normal limits   ISTAT PROCEDURE - Abnormal; Notable for the following:     POC PH 7.311 (*)     POC PO2 74 (*)     POC HCO3 22.0 (*)     POC SATURATED O2 93 (*)     All other components within normal limits   TSH   PROTIME-INR   APTT   CARBAMAZEPINE LEVEL, TOTAL   ISTAT PROCEDURE   POCT GLUCOSE MONITORING CONTINUOUS     EKG Readings: (Independently Interpreted)   NSR no ST abnormalities       X-Rays:   Independently Interpreted Readings:   Chest X-Ray: Mild pulmonary edema    Head CT: No acute bleed      Medical Decision Making:   History:   Old Medical Records: I decided to obtain old medical records.  Initial Assessment:   Lucy Perez is a 55 yo F with PMH of CVA in August and November with residual left sided weakness, CHF, Dm, HLD, HTN, sarcoid, Ra, CKD 3 presenting with aphasia, unresponsiveness and poor motor effort since 9 AM today, NIHSS of 26.  Differential Diagnosis:   CT to evaluate for Ischemic stroke vs. hemorrhagic stroke. Traumatic intracranial hemorrhage less likely given absence of mechanism. Increased ICP possible due to bradycardia, increased BP and mild hypoxia. Sepsis less likely, CBC ordered to rule out occult infection.  Possible PNA secondary to aspiration, CXR ordered. Dispo likely admission with vascular neurology consult pending stroke work up.  Independently Interpreted Test(s):   I have ordered and independently interpreted X-rays - see prior notes.  I have ordered and independently interpreted EKG Reading(s) - see prior notes  Clinical Tests:   Lab Tests: Ordered  Radiological Study: Ordered and Reviewed  Medical Tests: Ordered and Reviewed  ED Management:  Patient is out of the window for acute thrombolysis given symptom onset at 9 Am. Stroke work up ordered. Vascular surgery to be consulted. Vitals to be ordered for worsening bradycardia.+            Scribe Attestation:   Scribe #1: I performed the above scribed service and the documentation accurately describes the services I performed. I attest to the accuracy of the note.    Attending Attestation:   Physician Attestation Statement for Resident:  As the supervising MD   Physician Attestation Statement: I have personally seen and examined this patient.   I agree with the above history. -: 57 yo f, h/o R sided hemorrhagic CVA with L paresis, seizure disorder on keppra/tegretol, baseline conversant, over past day minimally responsive, last seen normal last night.  On arrival, pt minimally responsive, PERRL.  On resident's exam, pt with rigid R UE, on my exam 30 min later, pt with no rigidity though still unresponsive.  Unclear if pt in nonconvulsive status or MS poor bc postictal.  keppra 1500 mg IV ordered.  Neuro ICU consulted and recommend ativan IV in case NCS.  They will admit to the neuro ICU for continuous EEG monitoring.  Will hold off on intubation at this time though pt may need   As the supervising MD I agree with the above PE.    As the supervising MD I agree with the above treatment, course, plan, and disposition.  I have reviewed and agree with the residents interpretation of the following: lab data, x-rays, CT scans and EKG.  I have reviewed the following: old  records at this facility.          Physician Attestation for Scribe:  Physician Attestation Statement for Scribe #1: I, Dr. Stern, reviewed documentation, as scribed by Ginette Berkowitz  in my presence, and it is both accurate and complete.                 ED Course     Clinical Impression:   The primary encounter diagnosis was Altered mental status, unspecified altered mental status type. A diagnosis of Seizure disorder was also pertinent to this visit.          Anayeli Stern MD  06/12/17 3515

## 2017-06-07 NOTE — SUBJECTIVE & OBJECTIVE
Interval History:    Vital signs stable overnight. Will resume home blood pressure medications and long acting insulin with sliding scale. Pt continues to be lethargic. It was explained to the family that we will also be treating her for a UTI with ceftriaxone.    Review of Systems   Unable to perform ROS: Mental status change     Objective:     Vitals:  Temp: 98.4 °F (36.9 °C) (06/07/17 1705)  Pulse: 67 (06/07/17 1705)  Resp: 10 (06/07/17 1705)  BP: (!) 140/64 (06/07/17 1605)  SpO2: 99 % (06/07/17 1705)    Temp:  [94.1 °F (34.5 °C)-99 °F (37.2 °C)] 98.4 °F (36.9 °C)  Pulse:  [57-68] 67  Resp:  [10-43] 10  SpO2:  [95 %-99 %] 99 %  BP: (134-232)/() 140/64  Arterial Line BP: (135-181)/(52-72) 165/63              06/06 0701 - 06/07 0700  In: 451.7 [I.V.:451.7]  Out: -     Physical Exam     GA: lethargic, comfortable, no acute distress.   HEENT: No scleral icterus or JVD.   Pulmonary: Clear to auscultation A/P/L. No wheezing, crackles, or rhonchi.  Cardiac: RRR S1 & S2 w/o rubs/murmurs/gallops.   Abdominal: Bowel sounds present x 4. No appreciable hepatosplenomegaly.  Skin: No jaundice, rashes, or visible lesions.  Neuro:  --Mental Status:  Lethargic, eye opening to noxious stimuli, does not follow commands. Left gaze preference  --Pupils 3mm, PERRL.   --Corneal reflex, gag, cough intact.  Left facial nerve palsy, UMN pattern.   Withdraws to plain in the RUE and RLE   Left ue and le spasticity and hemiparesis    Dola Coma Scale    Medications:  Continuous  sodium chloride 0.9% Last Rate: 75 mL/hr at 06/07/17 1705   nicardipine Last Rate: Stopped (06/07/17 0500)   Scheduled  amlodipine 10 mg Daily   carbamazepine 200 mg BID   cefTRIAXone (ROCEPHIN) IVPB 1 g Q24H   heparin (porcine) 5,000 Units Q8H   hydrALAZINE 100 mg Q8H   insulin detemir 10 Units QHS   labetalol 300 mg TID   levetiracetam IVPB 1,000 mg Q12H   sodium chloride 0.9% 3 mL Q8H   PRN  dextrose 50% 12.5 g PRN   glucagon (human recombinant) 1 mg PRN    insulin aspart 1-10 Units Q6H PRN     Today I personally reviewed pertinent medications, lines/drains/airways, imaging, lab results, microbiology results,

## 2017-06-07 NOTE — CONSULTS
"[facility]  Vascular Neurology  Comprehensive Stroke Center  Consult Note      Inpatient consult to Vascular (Stroke) Neurology  Consult performed by: ANNIE ALEGRE  Consult ordered by: SPENCER GIBBONS    Inpatient consult to Vascular/Stroke Neurology - [facility] only  Consult performed by: ANNIE ALEGRE  Consult ordered by: SPENCER GIBBONS        Subjective:     History of Present Illness:  Patient is a 56 y.o. female with significant past medical history of HTN, DM II on insulin, HLD, sarcoidosis, RA, CKD III, and residual left sided weakness due to previous stroke (2016) presented to hospital complaining of aphasia.  The patient's daughter provided HPI information due to the patient's condition.  The patient was LKN yesterday.  Her daughter states she went to see her this am around 0900 and noted the patient was not talking.  She states the patient is usually conversant but was quiet today.  The patient was noted to be tracking the movements of others in the room with her eyes at the time.  The patient took at nap ~1400/  Her daughter tried to wake her up ~1800 but noted the patient was "staring at the ceiling" and had her mouth clinched.  The patient remained nonverbal.  Her daughter decided the patient needed an evaluation in the ED as this is not her typical behavior.  The ED workup included a CTH that is negative for acute findings.  Other possible causes for the patient's symptoms include seizure or antiepileptic toxicity given lack of responsiveness.  Recommend MRI to exclude acute stroke.               Past Medical History:   Diagnosis Date    Acute hypoxemic respiratory failure     Acute on chronic diastolic heart failure 11/7/2016    Anxiety     Asthma     Bipolar disorder     Bronchitis, acute     Cataract     CKD (chronic kidney disease) stage 3, GFR 30-59 ml/min     stage 3    Depression     Diabetes with neurologic complications     General " "anesthetics causing adverse effect in therapeutic use     Hemorrhagic cerebrovascular accident (CVA)     8/2016 s/p Hemicraniotomy at [facility] with Left hemiparesis    Hyperlipidemia     Hypertension     Obesity     Peripheral neuropathy     Pneumonia     Rheumatoid arthritis     Sarcoidosis     Sleep apnea     CPAP    Type II or unspecified type diabetes mellitus with renal manifestations, uncontrolled      Past Surgical History:   Procedure Laterality Date    breast reduction      BREAST SURGERY      breast reduction    COLONOSCOPY N/A 8/11/2016    Procedure: COLONOSCOPY;  Surgeon: Jerry Vilchis MD;  Location: [facility] ([address] [place] [address]);  Service: Endoscopy;  Laterality: N/A;  Patient reports difficulty awaking from anesthesia in the past.    HYSTERECTOMY      ROTATOR CUFF REPAIR Right July 9, 2014    right side    stent placed      in vertebral artery    TUBAL LIGATION       Family History   Problem Relation Age of Onset    Cancer Mother 55     Breast cancer    Diabetes Mother     Hypertension Mother     Heart disease Mother     Heart attack Mother     Stroke Sister     Hypertension Sister     Sleep apnea Sister     No Known Problems Daughter     No Known Problems Son     Bell's palsy Sister     Lupus Sister     Blindness Maternal Grandmother     Diabetes       "My entire family family has diabetes"    Stroke Maternal Aunt     Amblyopia Neg Hx     Cataracts Neg Hx     Glaucoma Neg Hx     Macular degeneration Neg Hx     Retinal detachment Neg Hx     Strabismus Neg Hx      Social History   Substance Use Topics    Smoking status: Never Smoker    Smokeless tobacco: Never Used    Alcohol use No      Comment: occasional wine cooler      Review of patient's allergies indicates:   Allergen Reactions    Vicodin [hydrocodone-acetaminophen] Rash     Medications: I have reviewed the current medication administration record.        Current Outpatient Prescriptions:     albuterol (PROVENTIL) 2.5 " "mg /3 mL (0.083 %) nebulizer solution, Take 3 mLs (2.5 mg total) by nebulization every 6 (six) hours as needed for Wheezing., Disp: 50 each, Rfl: 0    amlodipine (NORVASC) 10 MG tablet, Take 1 tablet (10 mg total) by mouth once daily., Disp: 90 tablet, Rfl: 2    aspirin (ECOTRIN) 81 MG EC tablet, Take 81 mg by mouth once daily.  , Disp: , Rfl:     atorvastatin (LIPITOR) 40 MG tablet, Take 1 tablet (40 mg total) by mouth once daily. For Cholesterol, Disp: 90 tablet, Rfl: 2    baclofen (LIORESAL) 20 MG tablet, Take 1/2 tablet (20 mg total) by mouth 3 (three) times daily., Disp: 90 tablet, Rfl: 11    BD INSULIN PEN NEEDLE UF SHORT 31 gauge x 5/16" Ndle, USE TO INJECT NOVOLOG FLEXPEN BEFORE MEALS, Disp: 150 each, Rfl: 11    blood sugar diagnostic (ACCU-CHEK SMARTVIEW TEST STRIP) Strp, 1 strip by Misc.(Non-Drug; Combo Route) route 2 (two) times daily., Disp: 300 each, Rfl: 3    carbamazepine (TEGRETOL) 200 mg tablet, 1 tablet (200 mg total) by Per G Tube route 2 (two) times daily., Disp: 60 tablet, Rfl: 11    clotrimazole-betamethasone 1-0.05% (LOTRISONE) cream, Apply topically 2 (two) times daily. Apply to area of rash/iting on buttocks 1-2x/day as needed, Disp: 45 g, Rfl: 0    colchicine 0.6 mg tablet, 1 tab daily as needed for gout flare, Disp: 30 tablet, Rfl: 1    dantrolene (DANTRIUM) 50 MG Cap, Take 1 capsule (50 mg total) by mouth 3 (three) times daily., Disp: 90 capsule, Rfl: 0    famotidine (PEPCID) 20 MG tablet, TAKE 1 TABLET (20 MG TOTAL) BY MOUTH ONCE DAILY., Disp: 90 tablet, Rfl: 3    fluoxetine (PROZAC) 20 MG tablet, Take 1 tablet (20 mg total) by mouth once daily., Disp: 30 tablet, Rfl: 0    furosemide (LASIX) 40 MG tablet, Take 1 tablet (40 mg total) by mouth once daily., Disp: 90 tablet, Rfl: 2    gabapentin (NEURONTIN) 100 MG capsule, 1 in AM; 3 caps at Bedtime, Disp: 360 capsule, Rfl: 3    hydrALAZINE (APRESOLINE) 100 MG tablet, Take 1 tablet (100 mg total) by mouth 3 (three) times " daily., Disp: 270 tablet, Rfl: 3    insulin detemir (LEVEMIR FLEXTOUCH) 100 unit/mL (3 mL) SubQ InPn pen, 16 units in AM and 10 units in the PM for Diabetes, Disp: 2 Box, Rfl: 0    labetalol (NORMODYNE) 100 MG tablet, 5 tablets (500 mg total) by Per G Tube route every 8 (eight) hours., Disp: 1350 tablet, Rfl: 3    levetiracetam (KEPPRA) 500 MG Tab, Take 1 tablet (500 mg total) by mouth 2 (two) times daily., Disp: 60 tablet, Rfl: 6    lisinopril 10 MG tablet, Take 1 tablet (10 mg total) by mouth once daily., Disp: 30 tablet, Rfl: 3    multivitamin with iron Tab, 1/day (Patient taking differently: Take 1 tablet by mouth once daily. ), Disp: 30 each, Rfl: prn    nut.tx.gluc.intol,lac-free,soy (GLUCERNA 1.5 NOAH) Liqd, 1.5 cans in AM, 1 can at Noon , 1.5cans at Dinner, and 1 can before Bed/Total 5 cans daily, Disp: 150 Can, Rfl: 0    pantoprazole (PROTONIX) 40 MG tablet, Take 1 tablet (40 mg total) by mouth once daily., Disp: 30 tablet, Rfl: 0    sodium polystyrene (KAYEXALATE) 15 gram/60 mL Susp, Take 60 mLs (15 g total) by mouth once daily. 60ml(15gm)/PEG now and repeat Q6 hours x 3 doses, Disp: 180 mL, Rfl: 0    urea 20 % Crea, Apply cream to feet daily for dryness/diabetic health, Disp: 113 g, Rfl: 6      Review of Systems   Unable to perform ROS: Patient unresponsive   Constitutional: Negative for fever.   HENT: Negative for drooling.    Eyes: Negative for discharge.   Respiratory: Negative for cough.    Gastrointestinal: Negative for vomiting.   Genitourinary: Negative for hematuria.   Skin: Positive for rash (to buttocks).   Allergic/Immunologic: Negative for environmental allergies and food allergies.   Neurological: Positive for speech difficulty. Negative for weakness (baseline left sided weakness).     Objective:     Vital Signs (Most Recent):  Temp: 98.4 °F (36.9 °C) (06/06/17 2000)  Pulse: (!) 59 (06/06/17 2103)  Resp: 18 (06/06/17 2031)  BP: (!) 173/72 (06/06/17 2102)  SpO2: 96 % (06/06/17  2103)    Vital Signs Range (Last 24H):  Temp:  [98.4 °F (36.9 °C)]   Pulse:  [59-63]   Resp:  [18-20]   BP: (146-185)/(66-80)   SpO2:  [96 %-98 %]     Physical Exam   Constitutional: She appears well-developed and well-nourished.   HENT:   Head: Normocephalic and atraumatic.   Right Ear: External ear normal.   Left Ear: External ear normal.   Nose: Nose normal.   Mouth/Throat: Oropharynx is clear and moist. No oropharyngeal exudate.   Eyes: Conjunctivae are normal. Pupils are equal, round, and reactive to light. Right eye exhibits no discharge. Left eye exhibits no discharge. No scleral icterus.   Neck: Neck supple.   Cardiovascular: Normal rate and regular rhythm.    Pulmonary/Chest: Effort normal and breath sounds normal.   Abdominal: Soft. Bowel sounds are normal. She exhibits no distension.       Musculoskeletal: She exhibits no edema.   Lymphadenopathy:     She has no cervical adenopathy.   Neurological: She is unresponsive. GCS eye subscore is 2 - to pain. GCS verbal subscore is 1 - none. GCS motor subscore is 4 - withdraws from pain.   Skin: Skin is warm and dry.   Nursing note and vitals reviewed.      Neurological Exam:   LOC: does not follow requests and comatose  EOM (CN III, IV, VI): Gaze preference left    NIH Stroke Scale:  Interval: baseline (upon arrival/admit)  Level of Consciousness: 3 - coma  LOC Questions: 2 - answers none correctly  LOC Commands: 2 - performs neither correctly  Best Gaze: 1 - partial gaze palsy  Visual: 0 - no visual loss  Facial Palsy: 0 - normal  Motor Left Arm: 4 - no movement  Motor Right Arm: 3 - no effort against gravity  Motor Left Le - no movement  Motor Right Leg: 3 - no effort against gravity  Limb Ataxia: 0 - absent  Sensory: 0 - normal  Best Language: 3 - mute  Dysarthria: 2 - near to unintelligible  Extinction and Inattention: 1 - partial neglect  NIH Stroke Scale Total: 28  [place] Coma Scale:  Best Eye Response: 2 - to pain  Best Motor Response: 4 - withdraws  from pain  Best Verbal Response: 1 - none  Raul Coma Scale Total: 7  Modified Polk Scale:   Timeline: Prior to symptoms onset  Modified Polk Score: 4 - moderately severe disability    [id number] Scale:   Age: 0 - < 65 years old  CHF History: 0 - no  HTN History: 1 - yes  Stroke/TIA/Thromboembolism History: 2 - yes  Vascular Disease History: 0 - no  Diabetes Mellitus in History: 1 - yes  Female: 1 - yes  [id number] Scale Total: 5      Laboratory:  CMP:   Recent Labs  Lab 06/06/17 2153   CALCIUM 9.3   ALBUMIN 3.6   PROT 7.2      K 4.1   CO2 20*      BUN 33*   CREATININE 1.9*   ALKPHOS 105   ALT 5*   AST 12   BILITOT 0.3     CBC:   Recent Labs  Lab 06/06/17 2153   WBC 6.81   RBC 2.68*   HGB 8.3*   HCT 26.1*      MCV 97   MCH 31.0   MCHC 31.8*     Lipid Panel: No results for input(s): CHOL, LDLCALC, HDL, TRIG in the last 168 hours.  Coagulation:   Recent Labs  Lab 06/06/17 2153   INR 0.9   APTT <21.0     Hgb A1C: No results for input(s): HGBA1C in the last 168 hours.  TSH:   Recent Labs  Lab 06/06/17 2153   TSH 1.355       Diagnostic Results:  Brain Imaging: CT Head. Date: 06/06/2017   Acute Pathology: None  Location: N/A  Old Vascular Pathology: Infarct, cranioplasty  Location: [place] radiata: left    Cerebrovascular Imaging: none    Cervical Vascular Imaging: none    Cardiac Evaluation: Trans-thoracic. Date: 10/30/16  Key Findings: Bi-atrial enlargement, left ventricular diastolic dysfunction  EKG/Telemetry: Sinus rhythm    Assessment/Plan:     Aphasia    56 y.o. female with significant past medical history of HTN, DM II on insulin, HLD, sarcoidosis, RA, CKD III, and residual left sided weakness due to previous stroke (2016) presented to hospital complaining of aphasia.  CTH negative for acute stroke.  Symptoms likely related to seizure, patient will be admitted to [facility] due to concern for airway protection and possible status epilepticus.  Antithrombotics for secondary stroke  prevention: Antiplatelets:  Aspirin: 81 mg oral daily  Statins for secondary stroke prevention and hyperlipidemia, if present: Atorvastatin- 40 mg oral daily  Aggressive risk factor modification: Hypertension, Diabetes, High Cholesterol and Obesity Rehab Efforts: Physical Therapy, Occupational Therapy, Speech and Language Pathology and Physical Medicine and Rehabilitation-when patient is able to participate  Diagnostics: Ordered/Pending CT scan of head without contrast to asses brain parenchyma, HgbA1C to assess blood glucose levels, Lipid Profile to assess cholesterol levels, MRI head without contrast to assess brain parenchyma-to r/o acute stroke, Trans-thoracic cardiac echo to assess cardiac function/status, TSH to assess thyroid function  VTE Prophylaxis: Heparin 5000 units SQ every 8 hours, BP Parameters: SBP <180  Nursing Orders: Neuro checks- hourly, Complete ELIZABETH unless evaluated by speech, Seizure precautions, Out of bed BID, Avoid Bourgeois catheter, Pneumatic compression device, Stroke Education, Blood glucose target 100-130                                    Essential hypertension    -Stroke risk factor.  Plan as above.        Hyperlipidemia    -Stroke risk factor.  Plan as above.        Type 2 diabetes mellitus with diabetic chronic kidney disease    -Stroke risk factor.  Plan as above.        Seizure disorder    -Managed by [facility].            Thrombolysis Candidate? No  1. Contraindications: Outside of treament window and Unclear onset of symptoms    Interventional Revascularization Candidate?  No; No large vessel occlusion    Research Candidate? No    Michelle Naranjo NP  [facility]  Department of Vascular Neurology   [facility]

## 2017-06-07 NOTE — H&P
"History & Physical  Neurocritical Care    Admit Date: 6/6/2017  LOS: 0    Code Status: Prior     CC: <principal problem not specified>    SUBJECTIVE:     History of Present Illness: Lucy Perez is a 57 yo F with PMH of CVA in August and November with residual left sided weakness, CHF, Dm, HLD, HTN, sarcoid, Ra, CKD 3 presenting with aphasia, unresponsiveness and poor motor effort since 9 AM today. The patient's family reports that the patient has had an elevated BP to the 180s for the past week. They report that at times the patient "will be sleepy" and poorly responsive and usually improves within a few hours. However,  At about 2pm she was found to be staring up the ceiling and remained unresponsive.  The family attempted to feed the patient at lunch through her PEG tube but the patient vomited shortly thereafter.   Her baseline is described as dependent for activities of daily living, verbal and interactive with limited mobility due to left hemiparesis.   She had hemicranectomy for stroke treatment in 2016. She is currently on keppra and tegretol and per family was started post stroke for seizure prophylaxis. No history of seizure disorder per patient's daughter.       Past Medical History:   Diagnosis Date    Acute hypoxemic respiratory failure     Acute on chronic diastolic heart failure 11/7/2016    Anxiety     Asthma     Bipolar disorder     Bronchitis, acute     Cataract     CKD (chronic kidney disease) stage 3, GFR 30-59 ml/min     stage 3    Depression     Diabetes with neurologic complications     General anesthetics causing adverse effect in therapeutic use     Hemorrhagic cerebrovascular accident (CVA)     8/2016 s/p Hemicraniotomy at [facility] with Left hemiparesis    Hyperlipidemia     Hypertension     Obesity     Peripheral neuropathy     Pneumonia     Rheumatoid arthritis     Sarcoidosis     Sleep apnea     CPAP    Type II or unspecified type diabetes mellitus with renal " "manifestations, uncontrolled      Past Surgical History:   Procedure Laterality Date    breast reduction      BREAST SURGERY      breast reduction    COLONOSCOPY N/A 8/11/2016    Procedure: COLONOSCOPY;  Surgeon: Jerry Vilchis MD;  Location: [facility] ([address] [place] [address]);  Service: Endoscopy;  Laterality: N/A;  Patient reports difficulty awaking from anesthesia in the past.    HYSTERECTOMY      ROTATOR CUFF REPAIR Right July 9, 2014    right side    stent placed      in vertebral artery    TUBAL LIGATION       No current facility-administered medications on file prior to encounter.      Current Outpatient Prescriptions on File Prior to Encounter   Medication Sig Dispense Refill    albuterol (PROVENTIL) 2.5 mg /3 mL (0.083 %) nebulizer solution Take 3 mLs (2.5 mg total) by nebulization every 6 (six) hours as needed for Wheezing. 50 each 0    alprazolam (XANAX) 0.5 MG tablet Take 1 tablet (0.5 mg total) by mouth as directed. 1/2 to 1 tab Q8 hours as needed for anxiety 30 tablet 0    amlodipine (NORVASC) 10 MG tablet Take 1 tablet (10 mg total) by mouth once daily. 90 tablet 2    aspirin (ECOTRIN) 81 MG EC tablet Take 81 mg by mouth once daily.        atorvastatin (LIPITOR) 40 MG tablet Take 1 tablet (40 mg total) by mouth once daily. For Cholesterol 90 tablet 2    baclofen (LIORESAL) 20 MG tablet Take 1 tablet (20 mg total) by mouth 3 (three) times daily. 90 tablet 11    BD INSULIN PEN NEEDLE UF SHORT 31 gauge x 5/16" Ndle USE TO INJECT NOVOLOG FLEXPEN BEFORE MEALS 150 each 11    blood sugar diagnostic (ACCU-CHEK SMARTVIEW TEST STRIP) Strp 1 strip by Misc.(Non-Drug; Combo Route) route 2 (two) times daily. 300 each 3    carbamazepine (TEGRETOL) 200 mg tablet 1 tablet (200 mg total) by Per G Tube route 2 (two) times daily. 60 tablet 11    ciprofloxacin HCl (CILOXAN) 0.3 % ophthalmic solution 1-2 drops to affected eye Q4-6 hours till resolved 5 mL 0    clotrimazole-betamethasone 1-0.05% (LOTRISONE) cream Apply " topically 2 (two) times daily. Apply to area of rash/iting on buttocks 1-2x/day as needed 45 g 0    colchicine 0.6 mg tablet 1 tab daily as needed for gout flare 30 tablet 1    dantrolene (DANTRIUM) 50 MG Cap Take 1 capsule (50 mg total) by mouth 3 (three) times daily. 90 capsule 0    famotidine (PEPCID) 20 MG tablet TAKE 1 TABLET (20 MG TOTAL) BY MOUTH ONCE DAILY. 90 tablet 3    fluoxetine (PROZAC) 20 MG tablet Take 1 tablet (20 mg total) by mouth once daily. 30 tablet 0    furosemide (LASIX) 40 MG tablet Take 1 tablet (40 mg total) by mouth once daily. 90 tablet 2    gabapentin (NEURONTIN) 100 MG capsule 1 in AM; 3 caps at Bedtime 360 capsule 3    hydrALAZINE (APRESOLINE) 100 MG tablet Take 1 tablet (100 mg total) by mouth 3 (three) times daily. 270 tablet 3    insulin detemir (LEVEMIR FLEXTOUCH) 100 unit/mL (3 mL) SubQ InPn pen 16 units in AM and 10 units in the PM for Diabetes 2 Box 0    labetalol (NORMODYNE) 100 MG tablet 5 tablets (500 mg total) by Per G Tube route every 8 (eight) hours. 1350 tablet 3    levetiracetam (KEPPRA) 500 MG Tab Take 1 tablet (500 mg total) by mouth 2 (two) times daily. 60 tablet 6    lisinopril 10 MG tablet Take 1 tablet (10 mg total) by mouth once daily. 30 tablet 3    multivitamin with iron Tab 1/day (Patient taking differently: Take 1 tablet by mouth once daily. ) 30 each prn    nut.tx.gluc.intol,lac-free,soy (GLUCERNA 1.5 NOAH) Liqd 1.5 cans in AM, 1 can at Noon , 1.5cans at Dinner, and 1 can before Bed/Total 5 cans daily 150 Can 0    pantoprazole (PROTONIX) 40 MG tablet Take 1 tablet (40 mg total) by mouth once daily. 30 tablet 0    sodium polystyrene (KAYEXALATE) 15 gram/60 mL Susp Take 60 mLs (15 g total) by mouth once daily. 60ml(15gm)/PEG now and repeat Q6 hours x 3 doses 180 mL 0    urea 20 % Crea Apply cream to feet daily for dryness/diabetic health 113 g 6    [DISCONTINUED] clotrimazole-betamethasone 1-0.05% (LOTRISONE) cream Apply topically 2 (two) times  "daily. Apply to area of rash/iting on buttocks 1-2x/day as needed 45 g 0    [DISCONTINUED] lisinopril 10 MG tablet Take 1 tablet (10 mg total) by mouth once daily. 30 tablet 3     Review of patient's allergies indicates:   Allergen Reactions    Vicodin [hydrocodone-acetaminophen] Rash     Family History   Problem Relation Age of Onset    Cancer Mother 55     Breast cancer    Diabetes Mother     Hypertension Mother     Heart disease Mother     Heart attack Mother     Stroke Sister     Hypertension Sister     Sleep apnea Sister     No Known Problems Daughter     No Known Problems Son     Bell's palsy Sister     Lupus Sister     Blindness Maternal Grandmother     Diabetes       "My entire family family has diabetes"    Stroke Maternal Aunt     Amblyopia Neg Hx     Cataracts Neg Hx     Glaucoma Neg Hx     Macular degeneration Neg Hx     Retinal detachment Neg Hx     Strabismus Neg Hx      Social History   Substance Use Topics    Smoking status: Never Smoker    Smokeless tobacco: Never Used    Alcohol use No      Comment: occasional wine cooler       Review of Symptoms:  Constitutional: Denies fevers, weight loss, chills, or weakness.  Eyes: Denies changes in vision.  ENT: Denies dysphagia, nasal discharge, ear pain or discharge.  Cardiovascular: Denies chest pain, palpitations, orthopnea, or claudication.  Respiratory: Denies shortness of breath, cough, hemoptysis, or wheezing.  GI: Denies nausea/vomitting, hematochezia, melena, abd pain, or changes in appetite.  : Denies dysuria, incontinence, or hematuria.  Musculoskeletal: Denies joint pain or myalgias.  Skin/breast: Denies rashes, lumps, lesions, or discharge.  Neurologic: Denies headache, dizziness, vertigo, or paresthesias.  Psychiatric: Denies changes in mood or hallucinations.  Endocrine: Denies polyuria, polydipsia, heat/cold intolerance.  Hematologic/Lymph: Denies lymphadenopathy, easy bruising or easy " bleeding.  Allergic/Immunologic: Denies rash, rhinitis.     OBJECTIVE:   Vital Signs (Most Recent):   Temp: 98.4 °F (36.9 °C) (06/06/17 2000)  Pulse: (!) 59 (06/06/17 2103)  Resp: 18 (06/06/17 2031)  BP: (!) 173/72 (06/06/17 2102)  SpO2: 96 % (06/06/17 2103)    Vital Signs (24h Range):   Temp:  [98.4 °F (36.9 °C)] 98.4 °F (36.9 °C)  Pulse:  [59-63] 59  Resp:  [18-20] 18  SpO2:  [96 %-98 %] 96 %  BP: (146-185)/(66-80) 173/72    ICP/CPP (Last 24h):        I & O (Last 24h):  No intake or output data in the 24 hours ending 06/06/17 2259  Physical Exam:  GA: lethargic, comfortable, no acute distress.   HEENT: No scleral icterus or JVD.   Pulmonary: Clear to auscultation A/P/L. No wheezing, crackles, or rhonchi.  Cardiac: RRR S1 & S2 w/o rubs/murmurs/gallops.   Abdominal: Bowel sounds present x 4. No appreciable hepatosplenomegaly.  Skin: No jaundice, rashes, or visible lesions.  Neuro:  --Mental Status:  Lethargic, eye opening to noxious stimuli, does not follow commands. Left gaze preference  --Pupils 3mm, PERRL.   --Corneal reflex, gag, cough intact.  Left facial nerve palsy, UMN type.   No spontaneous movement of her extremities observed during evaluation.   Left ue and le spasticity.     Vent Data:        Lines/Drains/Airway:          Nutrition/Tube Feeds:   Current Diet Order   Procedures    Diet NPO     Until patient passes ELIZABETH or evaluated by speech        Labs:  ABG: No results for input(s): PH, PO2, PCO2, HCO3, POCSATURATED, BE in the last 24 hours.  BMP:  Recent Labs  Lab 06/06/17 2153      K 4.1      CO2 20*   BUN 33*   CREATININE 1.9*   *     LFT: Lab Results   Component Value Date    AST 12 06/06/2017    ALT 5 (L) 06/06/2017    ALKPHOS 105 06/06/2017    BILITOT 0.3 06/06/2017    ALBUMIN 3.6 06/06/2017    PROT 7.2 06/06/2017     CBC:   Lab Results   Component Value Date    WBC 6.81 06/06/2017    HGB 8.3 (L) 06/06/2017    HCT 26.1 (L) 06/06/2017    MCV 97 06/06/2017     06/06/2017      Microbiology x 7d:   Microbiology Results (last 7 days)     ** No results found for the last 168 hours. **        Imaging:  CT head - encephalomalacia in right MCA territory.   I personally reviewed the above image.    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    Moderate nonproliferative diabetic retinopathy, with macular edema, associated with type 2 diabetes mellitus 07/26/2016    Hypertensive retinopathy of both eyes 07/26/2016    Cortical cataract of both eyes 07/26/2016    Left ventricular diastolic dysfunction with preserved systolic function 12/15/2015    Pulmonary hypertension 12/15/2015    Type 2 diabetes mellitus with diabetic chronic kidney disease 10/22/2015    Diabetic peripheral neuropathy associated with type 2 diabetes mellitus 10/22/2015    Morbid obesity with BMI of 40.0-44.9, adult 10/22/2015    S/P R shoulder surgery, SAD, DCE, biceps tenotomy 07/15/2014    Rotator cuff tear 07/02/2014    Arthritis of right acromioclavicular joint 07/02/2014    Impingement syndrome of right shoulder 07/02/2014    Sarcoidosis 11/26/2013    S/P total hysterectomy 07/10/2013    CKD (chronic kidney disease) stage 4, GFR 15-29 ml/min 03/25/2013    Essential hypertension 03/05/2013    Hyperlipidemia 03/05/2013    Mixed anxiety and depressive disorder 03/05/2013    THERESA (obstructive sleep apnea) 03/05/2013    Vertebral artery stenosis 03/05/2013    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    Degeneration of lumbar or lumbosacral intervertebral disc 03/05/2013    S/P breast biopsy, left 03/05/2013     Neuro:   Possible seizure disorder r/o NCSE  Will give ativan 3mg x1  keppra 1500mg [id number]  Place cEEG for monitoring.     Pulmonary:   Close monitoring of respiratory decompensation  Discussed with the patient daughter's  at the bedside concerning likely need for intubation and mechanical ventilation.     Cardiac:   Hemodynamically stable    Renal:    Monitor urine output  Baseline Cr 1.7-1.9    ID:   Afebrile, normal wbc    Hem/Onc:   Stable hh    Endocrine:    BS stable    Fluids/Electrolytes/Nutrition/GI:   NPO for now, pending stability due to aspiration risk  GI prophylaxis  SCD/heparin for dvt prophylaxis    Uninterrupted Critical Care/Counseling Time (not including procedures):: 36 mins    Josue Berry MD  Neurocritical care attending

## 2017-06-07 NOTE — PROGRESS NOTES
Pt arrived from ED to [facility] room 7005. VSS. NCC notified. Orders for EEG. Will continue to monitor.

## 2017-06-07 NOTE — HPI
"Patient is a 56 y.o. female with significant past medical history of HTN, DM II on insulin, HLD, sarcoidosis, RA, CKD III, and residual left sided weakness due to previous stroke (2016) presented to hospital complaining of aphasia.  The patient's daughter provided HPI information due to the patient's condition.  The patient was LKN yesterday.  Her daughter states she went to see her this am around 0900 and noted the patient was not talking.  She states the patient is usually conversant but was quiet today.  The patient was noted to be tracking the movements of others in the room with her eyes at the time.  The patient took at nap ~1400/  Her daughter tried to wake her up ~1800 but noted the patient was "staring at the ceiling" and had her mouth clinched.  The patient remained nonverbal.  Her daughter decided the patient needed an evaluation in the ED as this is not her typical behavior.  The ED workup included a CTH that is negative for acute findings.  Other possible causes for the patient's symptoms include seizure or antiepileptic toxicity given lack of responsiveness.  Recommend MRI to exclude acute stroke.        "

## 2017-06-08 LAB
ALBUMIN SERPL BCP-MCNC: 2.8 G/DL
ALP SERPL-CCNC: 104 U/L
ALT SERPL W/O P-5'-P-CCNC: <5 U/L
ANION GAP SERPL CALC-SCNC: 9 MMOL/L
AST SERPL-CCNC: 7 U/L
BASOPHILS # BLD AUTO: 0.01 K/UL
BASOPHILS NFR BLD: 0.1 %
BILIRUB SERPL-MCNC: 0.2 MG/DL
BUN SERPL-MCNC: 35 MG/DL
CALCIUM SERPL-MCNC: 8.5 MG/DL
CHLORIDE SERPL-SCNC: 114 MMOL/L
CO2 SERPL-SCNC: 19 MMOL/L
CREAT SERPL-MCNC: 1.8 MG/DL
DIFFERENTIAL METHOD: ABNORMAL
EOSINOPHIL # BLD AUTO: 0.1 K/UL
EOSINOPHIL NFR BLD: 0.9 %
ERYTHROCYTE [DISTWIDTH] IN BLOOD BY AUTOMATED COUNT: 12.7 %
EST. GFR  (AFRICAN AMERICAN): 35.3 ML/MIN/1.73 M^2
EST. GFR  (NON AFRICAN AMERICAN): 30.6 ML/MIN/1.73 M^2
ESTIMATED AVG GLUCOSE: 100 MG/DL
GLUCOSE SERPL-MCNC: 141 MG/DL
HBA1C MFR BLD HPLC: 5.1 %
HCT VFR BLD AUTO: 23 %
HGB BLD-MCNC: 7.4 G/DL
LEVETIRACETAM SERPL-MCNC: 29.8 UG/ML (ref 3–60)
LYMPHOCYTES # BLD AUTO: 0.7 K/UL
LYMPHOCYTES NFR BLD: 8.9 %
MAGNESIUM SERPL-MCNC: 1.5 MG/DL
MCH RBC QN AUTO: 31 PG
MCHC RBC AUTO-ENTMCNC: 32.2 %
MCV RBC AUTO: 96 FL
MONOCYTES # BLD AUTO: 0.3 K/UL
MONOCYTES NFR BLD: 4.3 %
NEUTROPHILS # BLD AUTO: 6.6 K/UL
NEUTROPHILS NFR BLD: 85.7 %
PHOSPHATE SERPL-MCNC: 4 MG/DL
PLATELET # BLD AUTO: 214 K/UL
PMV BLD AUTO: 9.8 FL
POCT GLUCOSE: 121 MG/DL (ref 70–110)
POCT GLUCOSE: 127 MG/DL (ref 70–110)
POCT GLUCOSE: 132 MG/DL (ref 70–110)
POCT GLUCOSE: 145 MG/DL (ref 70–110)
POCT GLUCOSE: 160 MG/DL (ref 70–110)
POCT GLUCOSE: 192 MG/DL (ref 70–110)
POTASSIUM SERPL-SCNC: 4 MMOL/L
PROT SERPL-MCNC: 6 G/DL
RBC # BLD AUTO: 2.39 M/UL
SODIUM SERPL-SCNC: 142 MMOL/L
WBC # BLD AUTO: 7.68 K/UL

## 2017-06-08 PROCEDURE — 85025 COMPLETE CBC W/AUTO DIFF WBC: CPT

## 2017-06-08 PROCEDURE — 84100 ASSAY OF PHOSPHORUS: CPT

## 2017-06-08 PROCEDURE — 95951 PR EEG MONITORING/VIDEORECORD: CPT | Mod: 26,,, | Performed by: PSYCHIATRY & NEUROLOGY

## 2017-06-08 PROCEDURE — 25000003 PHARM REV CODE 250: Performed by: PSYCHIATRY & NEUROLOGY

## 2017-06-08 PROCEDURE — 95951 HC EEG MONITORING/VIDEO RECORD: CPT

## 2017-06-08 PROCEDURE — 97802 MEDICAL NUTRITION INDIV IN: CPT

## 2017-06-08 PROCEDURE — 80053 COMPREHEN METABOLIC PANEL: CPT

## 2017-06-08 PROCEDURE — 63600175 PHARM REV CODE 636 W HCPCS: Performed by: PSYCHIATRY & NEUROLOGY

## 2017-06-08 PROCEDURE — 20000000 HC ICU ROOM

## 2017-06-08 PROCEDURE — 99233 SBSQ HOSP IP/OBS HIGH 50: CPT | Mod: ,,, | Performed by: PSYCHIATRY & NEUROLOGY

## 2017-06-08 PROCEDURE — 63600175 PHARM REV CODE 636 W HCPCS: Performed by: STUDENT IN AN ORGANIZED HEALTH CARE EDUCATION/TRAINING PROGRAM

## 2017-06-08 PROCEDURE — 95957 EEG DIGITAL ANALYSIS: CPT

## 2017-06-08 PROCEDURE — 63600175 PHARM REV CODE 636 W HCPCS: Performed by: NURSE PRACTITIONER

## 2017-06-08 PROCEDURE — 25000003 PHARM REV CODE 250: Performed by: STUDENT IN AN ORGANIZED HEALTH CARE EDUCATION/TRAINING PROGRAM

## 2017-06-08 PROCEDURE — 25000003 PHARM REV CODE 250: Performed by: NURSE PRACTITIONER

## 2017-06-08 PROCEDURE — 83735 ASSAY OF MAGNESIUM: CPT

## 2017-06-08 RX ORDER — LISINOPRIL 10 MG/1
10 TABLET ORAL DAILY
Status: DISCONTINUED | OUTPATIENT
Start: 2017-06-08 | End: 2017-06-09

## 2017-06-08 RX ORDER — POTASSIUM CHLORIDE 20 MEQ/15ML
40 SOLUTION ORAL
Status: DISCONTINUED | OUTPATIENT
Start: 2017-06-08 | End: 2017-06-22 | Stop reason: HOSPADM

## 2017-06-08 RX ORDER — NAPROXEN SODIUM 220 MG/1
81 TABLET, FILM COATED ORAL DAILY
Status: DISCONTINUED | OUTPATIENT
Start: 2017-06-08 | End: 2017-06-22 | Stop reason: HOSPADM

## 2017-06-08 RX ORDER — LISINOPRIL 20 MG/1
40 TABLET ORAL DAILY
Status: DISCONTINUED | OUTPATIENT
Start: 2017-06-08 | End: 2017-06-08

## 2017-06-08 RX ORDER — LORAZEPAM 2 MG/ML
2 INJECTION INTRAMUSCULAR ONCE
Status: COMPLETED | OUTPATIENT
Start: 2017-06-08 | End: 2017-06-08

## 2017-06-08 RX ORDER — LANOLIN ALCOHOL/MO/W.PET/CERES
800 CREAM (GRAM) TOPICAL
Status: DISCONTINUED | OUTPATIENT
Start: 2017-06-08 | End: 2017-06-22 | Stop reason: HOSPADM

## 2017-06-08 RX ORDER — SODIUM,POTASSIUM PHOSPHATES 280-250MG
2 POWDER IN PACKET (EA) ORAL
Status: DISCONTINUED | OUTPATIENT
Start: 2017-06-08 | End: 2017-06-22 | Stop reason: HOSPADM

## 2017-06-08 RX ORDER — AMOXICILLIN 250 MG
1 CAPSULE ORAL 2 TIMES DAILY
Status: DISCONTINUED | OUTPATIENT
Start: 2017-06-08 | End: 2017-06-22 | Stop reason: HOSPADM

## 2017-06-08 RX ORDER — CARBAMAZEPINE 200 MG/1
400 TABLET ORAL ONCE
Status: COMPLETED | OUTPATIENT
Start: 2017-06-08 | End: 2017-06-08

## 2017-06-08 RX ORDER — POTASSIUM CHLORIDE 20 MEQ/15ML
60 SOLUTION ORAL
Status: DISCONTINUED | OUTPATIENT
Start: 2017-06-08 | End: 2017-06-22 | Stop reason: HOSPADM

## 2017-06-08 RX ORDER — FAMOTIDINE 20 MG/1
20 TABLET, FILM COATED ORAL DAILY
Status: DISCONTINUED | OUTPATIENT
Start: 2017-06-09 | End: 2017-06-22 | Stop reason: HOSPADM

## 2017-06-08 RX ORDER — CARBAMAZEPINE 100 MG/1
300 TABLET, CHEWABLE ORAL EVERY 8 HOURS
Status: DISCONTINUED | OUTPATIENT
Start: 2017-06-08 | End: 2017-06-09

## 2017-06-08 RX ADMIN — HYDRALAZINE HYDROCHLORIDE 100 MG: 50 TABLET ORAL at 01:06

## 2017-06-08 RX ADMIN — SODIUM CHLORIDE: 0.9 INJECTION, SOLUTION INTRAVENOUS at 02:06

## 2017-06-08 RX ADMIN — SODIUM CHLORIDE 500 ML: 0.9 INJECTION, SOLUTION INTRAVENOUS at 09:06

## 2017-06-08 RX ADMIN — INSULIN ASPART 1 UNITS: 100 INJECTION, SOLUTION INTRAVENOUS; SUBCUTANEOUS at 09:06

## 2017-06-08 RX ADMIN — LABETALOL HCL 300 MG: 100 TABLET, FILM COATED ORAL at 05:06

## 2017-06-08 RX ADMIN — STANDARDIZED SENNA CONCENTRATE AND DOCUSATE SODIUM 1 TABLET: 8.6; 5 TABLET, FILM COATED ORAL at 09:06

## 2017-06-08 RX ADMIN — CEFTRIAXONE 1 G: 1 INJECTION, SOLUTION INTRAVENOUS at 12:06

## 2017-06-08 RX ADMIN — Medication 800 MG: at 04:06

## 2017-06-08 RX ADMIN — LEVETIRACETAM 1000 MG: 10 INJECTION INTRAVENOUS at 12:06

## 2017-06-08 RX ADMIN — LISINOPRIL 10 MG: 10 TABLET ORAL at 10:06

## 2017-06-08 RX ADMIN — HYDRALAZINE HYDROCHLORIDE 100 MG: 50 TABLET ORAL at 05:06

## 2017-06-08 RX ADMIN — LABETALOL HCL 300 MG: 100 TABLET, FILM COATED ORAL at 01:06

## 2017-06-08 RX ADMIN — CARBAMAZEPINE 200 MG: 100 TABLET, CHEWABLE ORAL at 08:06

## 2017-06-08 RX ADMIN — LEVETIRACETAM 1000 MG: 10 INJECTION INTRAVENOUS at 08:06

## 2017-06-08 RX ADMIN — AMLODIPINE BESYLATE 10 MG: 10 TABLET ORAL at 08:06

## 2017-06-08 RX ADMIN — LORAZEPAM 2 MG: 2 INJECTION INTRAMUSCULAR; INTRAVENOUS at 12:06

## 2017-06-08 RX ADMIN — CARBAMAZEPINE 200 MG: 200 TABLET ORAL at 09:06

## 2017-06-08 RX ADMIN — HEPARIN SODIUM 5000 UNITS: 5000 INJECTION, SOLUTION INTRAVENOUS; SUBCUTANEOUS at 09:06

## 2017-06-08 RX ADMIN — CARBAMAZEPINE 300 MG: 100 TABLET, CHEWABLE ORAL at 01:06

## 2017-06-08 RX ADMIN — LEVETIRACETAM 1000 MG: 10 INJECTION INTRAVENOUS at 09:06

## 2017-06-08 RX ADMIN — STANDARDIZED SENNA CONCENTRATE AND DOCUSATE SODIUM 1 TABLET: 8.6; 5 TABLET, FILM COATED ORAL at 10:06

## 2017-06-08 RX ADMIN — HEPARIN SODIUM 5000 UNITS: 5000 INJECTION, SOLUTION INTRAVENOUS; SUBCUTANEOUS at 01:06

## 2017-06-08 RX ADMIN — Medication 3 ML: at 05:06

## 2017-06-08 RX ADMIN — ASPIRIN 81 MG CHEWABLE TABLET 81 MG: 81 TABLET CHEWABLE at 10:06

## 2017-06-08 RX ADMIN — Medication 3 ML: at 01:06

## 2017-06-08 RX ADMIN — INSULIN DETEMIR 10 UNITS: 100 INJECTION, SOLUTION SUBCUTANEOUS at 09:06

## 2017-06-08 RX ADMIN — HEPARIN SODIUM 5000 UNITS: 5000 INJECTION, SOLUTION INTRAVENOUS; SUBCUTANEOUS at 05:06

## 2017-06-08 RX ADMIN — CARBAMAZEPINE 300 MG: 100 TABLET, CHEWABLE ORAL at 09:06

## 2017-06-08 RX ADMIN — Medication 3 ML: at 09:06

## 2017-06-08 NOTE — PLAN OF CARE
Problem: Patient Care Overview  Goal: Plan of Care Review  Outcome: Ongoing (interventions implemented as appropriate)  No acute events throughout the day, VS and assessment per flow sheet, patient progressing towards goal as tolerated. Tube feedings started at 1030 at 10 with a goal of 50. Plan of care reviewed with Lucy Perez family, all concerns addressed. Will continue to monitor.

## 2017-06-08 NOTE — CONSULTS
"  [facility]  Adult Nutrition  Consult Note    SUMMARY     Recommendations    1. TF recommendations: Glucerna 1.5 @ 50 mL/hr to provide 1800 calories, 99 grams of protein, 911 mL fluid.    - Hold for residuals >500 mL; additional fluid per MD.   2. RD to monitor & follow-up.    Goals: Meet >75% EEN  Nutrition Goal Status: new  Communication of RD Recs: reviewed with RN    Reason for Assessment    Reason for Assessment: nurse/nurse practitioner consult  Diagnosis: other (see comments) (Seizure disorder)  Relevent Medical History: DM, HTN, HLD, PEG placed   Interdisciplinary Rounds: did not attend     General Information Comments: Pt nonverbal. PEG present.  Nutrition Discharge Planning: Tolerance of TF.    Nutrition Prescription Ordered    Current Diet Order: NPO    Nutrition Risk Screen     Nutrition Risk Screen: tube feeding or parenteral nutrition    Nutrition/Diet History    Patient Reported Diet/Restrictions/Preferences: other (see comments) (OSMAR)     Factors Affecting Nutritional Intake: NPO, difficulty/impaired swallowing    Labs/Tests/Procedures/Meds    Pertinent Labs Reviewed: reviewed, pertinent  Pertinent Labs Comments: BUN 35, Creat 1.9, GFR 35.3, Gluc 141, A1C 5.1  Pertinent Medications Reviewed: reviewed, pertinent  Pertinent Medications Comments: Insulin, Statin    Physical Findings    Overall Physical Appearance: overweight  Tubes: gastrostomy tube  Skin: intact    Anthropometrics    Height: 5' 4" (162.6 cm)  Weight Method: Stated  Weight: 102.1 kg (225 lb 1.4 oz)     Ideal Body Weight (IBW), Female: 120 lb  % Ideal Body Weight, Female (lb): 187.58 lb     BMI (Calculated): 38.7  BMI Grade: 35 - 39.9 - obesity - grade II    Estimated/Assessed Needs    Weight Used For Calorie Calculations: 102.1 kg (225 lb 1.4 oz)      Energy Need Method: Haakon-St Jeor, other (see comments) (3205-9559 kcal/d)     Weight Used For Protein Calculations: 102.1 kg (225 lb 1.4 oz)  Protein Requirements: "  g/d    Fluid Need Method: RDA Method, other (see comments) (Per MD or 1 mL/kcal)     CHO Requirement: 50% total kcals     Assessment and Plan    Inadequate energy intake r/t inability to consume sufficient energy AEB NPO with no alternate means of nutrition.  Status: New    Monitor and Evaluation    Food and Nutrient Intake: enteral nutrition intake  Food and Nutrient Adminstration: enteral and parenteral nutrition administration     Physical Activity and Function: nutrition-related ADLs and IADLs  Anthropometric Measurements: weight, weight change, body mass index  Biochemical Data, Medical Tests and Procedures: electrolyte and renal panel, gastrointestinal profile, glucose/endocrine profile, inflammatory profile, lipid profile  Nutrition-Focused Physical Findings: overall appearance     Nutrition Risk    Level of Risk: other (see comments) (2x/week)    Nutrition Follow-Up    RD Follow-up?: Yes

## 2017-06-08 NOTE — PLAN OF CARE
Problem: Patient Care Overview  Goal: Plan of Care Review  Outcome: Ongoing (interventions implemented as appropriate)  POC reviewed with pt and family. Pt unable to verbalize understanding. Questions and concerns addressed. Pt had several seizure like event overnight were Pt would stare off. Pt on continuous EEG monitoring. Pt progressing toward goals. Will continue to monitor. See flowsheets for full assessment and VS info

## 2017-06-08 NOTE — ASSESSMENT & PLAN NOTE
Will continue home regimen of Keppra and Tegretol  F/U EEG given concern for NCSE and continued AMS   EEG consistent with right sided PLEDs, will increase maint dose of tegretol

## 2017-06-08 NOTE — SUBJECTIVE & OBJECTIVE
Interval History:    Overnight pt had and episode of staring and unresponsiveness and was subsequently given ativan 2 mg IV and Keppra 1 g IV. EEG significant for right sided PLEDs. It was explained to the family the plan to increase the doses of home seizure medications.    Review of Systems   Unable to perform ROS: Mental status change     Objective:     Vitals:  Temp: 97.9 °F (36.6 °C) (06/08/17 1600)  Pulse: 68 (06/08/17 1600)  Resp: 14 (06/08/17 1600)  BP: (!) 162/70 (06/08/17 1600)  SpO2: 99 % (06/08/17 1600)    Temp:  [97 °F (36.1 °C)-98.6 °F (37 °C)] 97.9 °F (36.6 °C)  Pulse:  [55-82] 68  Resp:  [10-21] 14  SpO2:  [97 %-100 %] 99 %  BP: (109-162)/(55-75) 162/70  Arterial Line BP: (116-184)/(49-80) 159/61              06/07 0701 - 06/08 0700  In: 2537.1 [I.V.:1907.1]  Out: 750 [Urine:750]    Physical Exam         GA: lethargic, comfortable, no acute distress.   HEENT: No scleral icterus or JVD.   Pulmonary: Clear to auscultation A/P/L. No wheezing, crackles, or rhonchi.  Cardiac: RRR S1 & S2 w/o rubs/murmurs/gallops.   Abdominal: Bowel sounds present x 4. No appreciable hepatosplenomegaly.  Skin: No jaundice, rashes, or visible lesions.  Neuro:  --Mental Status:  Lethargic, eye opening to noxious stimuli, does not follow commands. Left gaze preference  --Pupils 3mm, PERRL.   --Corneal reflex, gag, cough intact.  Left facial nerve palsy, UMN pattern.   Withdraws to plain in the RUE and RLE   Left ue and le spasticity and hemiparesis    Hadley Coma Scale      Medications:  Continuous    nicardipine Last Rate: Stopped (06/08/17 1100)   Scheduled    amlodipine 10 mg Daily   aspirin 81 mg Daily   carbamazepine 300 mg Q8H   cefTRIAXone (ROCEPHIN) IVPB 1 g Q24H   [START ON 6/9/2017] famotidine 20 mg Daily   heparin (porcine) 5,000 Units Q8H   hydrALAZINE 100 mg Q8H   insulin detemir 10 Units QHS   labetalol 300 mg TID   levetiracetam IVPB 1,000 mg Q12H   lisinopril 10 mg Daily   senna-docusate 8.6-50 mg 1 tablet  BID   sodium chloride 0.9% 3 mL Q8H   PRN    dextrose 50% 12.5 g PRN   glucagon (human recombinant) 1 mg PRN   insulin aspart 1-10 Units Q6H PRN   magnesium oxide 800 mg PRN   magnesium oxide 800 mg PRN   potassium chloride 10% 40 mEq PRN   potassium chloride 10% 40 mEq PRN   potassium chloride 10% 60 mEq PRN   potassium, sodium phosphates 2 packet PRN   potassium, sodium phosphates 2 packet PRN   potassium, sodium phosphates 2 packet PRN     Today I personally reviewed pertinent medications, lines/drains/airways, imaging, lab results, microbiology results,

## 2017-06-08 NOTE — SUBJECTIVE & OBJECTIVE
Neurologic Chief Complaint: aphasia    Subjective:     Interval History: Patient is seen for follow-up neurological assessment and treatment recommendations: NAEON, neurologically stable, on cEEG    HPI, Past Medical, Family, and Social History remains the same as documented in the initial encounter.     Review of Systems   Constitutional: Negative for chills and fever.   Respiratory: Negative for cough and shortness of breath.    Gastrointestinal: Negative for vomiting.   Neurological: Positive for seizures, speech difficulty and weakness. Negative for numbness.   Psychiatric/Behavioral: Negative for agitation.     Scheduled Meds:   amlodipine  10 mg Per G Tube Daily    aspirin  81 mg Per G Tube Daily    carbamazepine  300 mg Per G Tube Q8H    cefTRIAXone (ROCEPHIN) IVPB  1 g Intravenous Q24H    [START ON 6/9/2017] famotidine  20 mg Per G Tube Daily    heparin (porcine)  5,000 Units Subcutaneous Q8H    hydrALAZINE  100 mg Per G Tube Q8H    insulin detemir  10 Units Subcutaneous QHS    labetalol  300 mg Per G Tube TID    levetiracetam IVPB  1,000 mg Intravenous Q12H    lisinopril  10 mg Oral Daily    senna-docusate 8.6-50 mg  1 tablet Per G Tube BID    sodium chloride 0.9%  3 mL Intravenous Q8H     Continuous Infusions:   nicardipine Stopped (06/08/17 1100)     PRN Meds:    Objective:     Vital Signs (Most Recent):  Temp: 98.2 °F (36.8 °C) (06/08/17 1200)  Pulse: (!) 58 (06/08/17 1200)  Resp: 15 (06/08/17 1200)  BP: (!) 115/56 (06/08/17 1200)  SpO2: 99 % (06/08/17 1200)  BP Location: Left arm    Vital Signs Range (Last 24H):  Temp:  [97 °F (36.1 °C)-98.6 °F (37 °C)]   Pulse:  [55-82]   Resp:  [10-21]   BP: (112-160)/(55-75)   SpO2:  [97 %-100 %]   Arterial Line BP: (117-184)/(49-80)   BP Location: Left arm    Physical Exam   Constitutional: She appears well-developed and well-nourished. No distress.   HENT:   Head: Atraumatic.   Eyes: Pupils are equal, round, and reactive to light.   Cardiovascular:  Normal rate.    Pulmonary/Chest: Effort normal. No respiratory distress.   Abdominal: Soft.   Skin: Skin is warm and dry.       Neurological Exam:   LOC: obtunded  Language: Mute  Speech: Mute  Orientation: Mute  Memory: Mute  Pupils (CN III, IV, VI): R pupil 1 mm, reactive Yes, brisk. L pupil 1 mm, reactive Yes, brisk.  Facial Movement (CN VII): symmetric facial expression  Motor*: Arm Left:  Paretic:  1/5, Leg Left:   Paretic:  1/5, Arm Right:   Paretic:  2/5, Leg Right:   Paretic:  2/5  Sensation: intact to light touch, temperature and vibration    NIH Stroke Scale:    Level of Consciousness: 3 - coma  LOC Questions: 2 - answers none correctly  LOC Commands: 2 - performs neither correctly  Best Gaze: 0 - normal  Visual: 0 - no visual loss  Facial Palsy: 0 - normal  Motor Left Arm: 3 - no effort against gravity  Motor Right Arm: 3 - no effort against gravity  Motor Left Leg: 3 - no effort against gravity  Motor Right Leg: 3 - no effort against gravity  Limb Ataxia: 0 - absent  Sensory: 0 - normal  Best Language: 3 - mute  Dysarthria: 2 - near to unintelligible  Extinction and Inattention: 1 - partial neglect  NIH Stroke Scale Total: 25      Laboratory:  CMP:     Recent Labs  Lab 06/08/17  0217   CALCIUM 8.5*   ALBUMIN 2.8*   PROT 6.0      K 4.0   CO2 19*   *   BUN 35*   CREATININE 1.8*   ALKPHOS 104   ALT <5*   AST 7*   BILITOT 0.2     CBC:     Recent Labs  Lab 06/08/17  0217   WBC 7.68   RBC 2.39*   HGB 7.4*   HCT 23.0*      MCV 96   MCH 31.0   MCHC 32.2     Lipid Panel: No results for input(s): CHOL, LDLCALC, HDL, TRIG in the last 168 hours.  Coagulation:     Recent Labs  Lab 06/06/17 2153   INR 0.9   APTT <21.0     Platelet Aggregation Study: No results for input(s): PLTAGG, PLTAGINTERP, PLTAGREGLACO, ADPPLTAGGREG in the last 168 hours.  Hgb A1C:     Recent Labs  Lab 06/07/17  0113   HGBA1C 5.1     TSH:     Recent Labs  Lab 06/06/17  2153   TSH 1.355       Diagnostic Results:  I have  personally reviewed:   Findings:     CT Head. Date: 06/06/17  Interval cranioplasty in this patient with a history of right cerebral hemorrhagic infarct. No acute major territorial infarction or hemorrhage.    Left corona radiata lacunar infarct, likely remote although new since the prior exam.    Senescent changes.

## 2017-06-08 NOTE — PROGRESS NOTES
Spoke to NCC on call concerning Pt rhythmic breath. MD at bedside reviewing EEG. New orders given and carried out. Will continue to monitor.

## 2017-06-08 NOTE — PROGRESS NOTES
Pt having seizure like event. NCC aware at bedside reviewing EEG. Pt vital signs stable. Will continue to monitor.

## 2017-06-08 NOTE — PROGRESS NOTES
ICU Attending Note  Neurocritical Care    [id number]    -cEEG  -increase carbamazepine with extra 400 mg now then 300 mg q8h  -levetiracetam increase to 1500 mg q12h  -consider lacosamide  -restart aspirin, atorvastatin  --200  -hydralazine, amlodipine, labetalol, restart lisinopril  -wean nicardipine  -stop NS  -ceftriaxone for possible UTI  -HGB >7  -TF  -detemir 10 units qhs, ISS  -home H2B  -heparin prophylaxis    Goal: Optimize anticonvulsant management, minimize benzodiazepine use.

## 2017-06-08 NOTE — PLAN OF CARE
Problem: Patient Care Overview  Goal: Plan of Care Review  Outcome: Ongoing (interventions implemented as appropriate)  1. TF recommendations: Glucerna 1.5 @ 50 mL/hr to provide 1800 calories, 99 grams of protein, 911 mL fluid.    - Hold for residuals >500 mL; additional fluid per MD.   2. RD to monitor & follow-up.

## 2017-06-08 NOTE — ASSESSMENT & PLAN NOTE
56 y.o. female with significant past medical history of HTN, DM II on insulin, HLD, sarcoidosis, RA, CKD III, and residual left sided weakness due to previous stroke (2016) presented to hospital complaining of aphasia.  CTH negative for acute stroke.  Symptoms likely related to seizure, patient will be admitted to [facility] due to concern for airway protection and possible status epilepticus. MRI head without contrast or repeat head CT (if unable to have MRI) to rule out acute stroke    Antiplatelets:  Aspirin: 81 mg oral daily  Atorvastatin- 40 mg oral daily  VTE Prophylaxis: Heparin 5000 units SQ every 8 hours  BP Parameters: SBP <180  Nursing Orders: Neuro checks- q1h

## 2017-06-08 NOTE — PROGRESS NOTES
[facility]  Vascular Neurology  Comprehensive Stroke Center  Progress Note    Neurologic Chief Complaint: aphasia    Subjective:     Interval History: Patient is seen for follow-up neurological assessment and treatment recommendations: NAEON, neurologically stable, on cEEG    HPI, Past Medical, Family, and Social History remains the same as documented in the initial encounter.     Review of Systems   Constitutional: Negative for chills and fever.   Respiratory: Negative for cough and shortness of breath.    Gastrointestinal: Negative for vomiting.   Neurological: Positive for seizures, speech difficulty and weakness. Negative for numbness.   Psychiatric/Behavioral: Negative for agitation.     Scheduled Meds:   amlodipine  10 mg Per G Tube Daily    aspirin  81 mg Per G Tube Daily    carbamazepine  300 mg Per G Tube Q8H    cefTRIAXone (ROCEPHIN) IVPB  1 g Intravenous Q24H    [START ON 6/9/2017] famotidine  20 mg Per G Tube Daily    heparin (porcine)  5,000 Units Subcutaneous Q8H    hydrALAZINE  100 mg Per G Tube Q8H    insulin detemir  10 Units Subcutaneous QHS    labetalol  300 mg Per G Tube TID    levetiracetam IVPB  1,000 mg Intravenous Q12H    lisinopril  10 mg Oral Daily    senna-docusate 8.6-50 mg  1 tablet Per G Tube BID    sodium chloride 0.9%  3 mL Intravenous Q8H     Continuous Infusions:   nicardipine Stopped (06/08/17 1100)     PRN Meds:    Objective:     Vital Signs (Most Recent):  Temp: 98.2 °F (36.8 °C) (06/08/17 1200)  Pulse: (!) 58 (06/08/17 1200)  Resp: 15 (06/08/17 1200)  BP: (!) 115/56 (06/08/17 1200)  SpO2: 99 % (06/08/17 1200)  BP Location: Left arm    Vital Signs Range (Last 24H):  Temp:  [97 °F (36.1 °C)-98.6 °F (37 °C)]   Pulse:  [55-82]   Resp:  [10-21]   BP: (112-160)/(55-75)   SpO2:  [97 %-100 %]   Arterial Line BP: (117-184)/(49-80)   BP Location: Left arm    Physical Exam   Constitutional: She appears well-developed and well-nourished. No distress.    HENT:   Head: Atraumatic.   Eyes: Pupils are equal, round, and reactive to light.   Cardiovascular: Normal rate.    Pulmonary/Chest: Effort normal. No respiratory distress.   Abdominal: Soft.   Skin: Skin is warm and dry.       Neurological Exam:   LOC: obtunded  Language: Mute  Speech: Mute  Orientation: Mute  Memory: Mute  Pupils (CN III, IV, VI): R pupil 1 mm, reactive Yes, brisk. L pupil 1 mm, reactive Yes, brisk.  Facial Movement (CN VII): symmetric facial expression  Motor*: Arm Left:  Paretic:  1/5, Leg Left:   Paretic:  1/5, Arm Right:   Paretic:  2/5, Leg Right:   Paretic:  2/5  Sensation: intact to light touch, temperature and vibration    NIH Stroke Scale:    Level of Consciousness: 3 - coma  LOC Questions: 2 - answers none correctly  LOC Commands: 2 - performs neither correctly  Best Gaze: 0 - normal  Visual: 0 - no visual loss  Facial Palsy: 0 - normal  Motor Left Arm: 3 - no effort against gravity  Motor Right Arm: 3 - no effort against gravity  Motor Left Leg: 3 - no effort against gravity  Motor Right Leg: 3 - no effort against gravity  Limb Ataxia: 0 - absent  Sensory: 0 - normal  Best Language: 3 - mute  Dysarthria: 2 - near to unintelligible  Extinction and Inattention: 1 - partial neglect  NIH Stroke Scale Total: 25      Laboratory:  CMP:     Recent Labs  Lab 06/08/17  0217   CALCIUM 8.5*   ALBUMIN 2.8*   PROT 6.0      K 4.0   CO2 19*   *   BUN 35*   CREATININE 1.8*   ALKPHOS 104   ALT <5*   AST 7*   BILITOT 0.2     CBC:     Recent Labs  Lab 06/08/17  0217   WBC 7.68   RBC 2.39*   HGB 7.4*   HCT 23.0*      MCV 96   MCH 31.0   MCHC 32.2     Lipid Panel: No results for input(s): CHOL, LDLCALC, HDL, TRIG in the last 168 hours.  Coagulation:     Recent Labs  Lab 06/06/17  2153   INR 0.9   APTT <21.0     Platelet Aggregation Study: No results for input(s): PLTAGG, PLTAGINTERP, PLTAGREGLACO, ADPPLTAGGREG in the last 168 hours.  Hgb A1C:     Recent Labs  Lab 06/07/17  0113   HGBA1C  5.1     TSH:     Recent Labs  Lab 06/06/17  2153   TSH 1.355       Diagnostic Results:  I have personally reviewed:   Findings:     CT Head. Date: 06/06/17  Interval cranioplasty in this patient with a history of right cerebral hemorrhagic infarct. No acute major territorial infarction or hemorrhage.    Left corona radiata lacunar infarct, likely remote although new since the prior exam.    Senescent changes.    Assessment/Plan:     Lucy Perez is a 56 y.o. female with a PMHx of HTN, DM II, HLD, sarcodiosis, RA, CKD, and previous stroke (LSW) who presented with aphasia, staring, and clinched mouth. Patient was admitted to [facility] for potential respiratory issues and possible NCSE.    06/07/17 Patient has not had MRI. Treated with keppra and ativan on arrival. Evidence of seizure activity on EEG overnight. Plans for continuous EEG monitoring.    06/08/17 patient neurologically stable, remains nonverbal and not following commands, would recommend repeat head CT if unable to get MRI    Aphasia    56 y.o. female with significant past medical history of HTN, DM II on insulin, HLD, sarcoidosis, RA, CKD III, and residual left sided weakness due to previous stroke (2016) presented to hospital complaining of aphasia.  CTH negative for acute stroke.  Symptoms likely related to seizure, patient will be admitted to [facility] due to concern for airway protection and possible status epilepticus. MRI head without contrast or repeat head CT (if unable to have MRI) to rule out acute stroke    Antiplatelets:  Aspirin: 81 mg oral daily  Atorvastatin- 40 mg oral daily  VTE Prophylaxis: Heparin 5000 units SQ every 8 hours  BP Parameters: SBP <180  Nursing Orders: Neuro checks- q1h                                    Essential hypertension    -Stroke risk factor.  Plan as above.        Hyperlipidemia    -Stroke risk factor.  Plan as above.        Seizure disorder    -seizure activity seen on EEG  -plans for continuous EEG  monitoring  -Managed by [facility]          Type 2 diabetes mellitus with diabetic chronic kidney disease    -Stroke risk factor  -management per primary team                Lula Rodriguez PA-C  Comprehensive Stroke Center  Department of Vascular Neurology   [facility]

## 2017-06-08 NOTE — ASSESSMENT & PLAN NOTE
Will resume home medications: amlodipine, labetalol, and hydralazine  Will continue ACE-I given hx of ckd and her kidney function is baseline.

## 2017-06-08 NOTE — PROGRESS NOTES
"[facility]  Neurocritical Care  Progress Note    Admit Date: 6/6/2017  Service Date: 06/08/2017  Length of Stay: 2    Subjective:     Chief Complaint: <principal problem not specified>    History of Present Illness: Lucy Perez is a 57 yo F with PMH of CVA in August and November with residual left sided weakness, CHF, Dm, HLD, HTN, sarcoid, Ra, CKD 3 presenting with aphasia, unresponsiveness and poor motor effort since 9 AM today. The patient's family reports that the patient has had an elevated BP to the 180s for the past week. They report that at times the patient "will be sleepy" and poorly responsive and usually improves within a few hours. However,  At about 2pm she was found to be staring up the ceiling and remained unresponsive.  The family attempted to feed the patient at lunch through her PEG tube but the patient vomited shortly thereafter.   Her baseline is described as dependent for activities of daily living, verbal and interactive with limited mobility due to left hemiparesis.   She had hemicranectomy for stroke treatment in 2016. She is currently on keppra and tegretol and per family was started post stroke for seizure prophylaxis. No history of seizure disorder per patient's daughter.    Hospital Course: 6/7: Continued somnolence and altered mental status, F/U EEG with epilepsy due to concern for NCSE  6/8: EEG: right sided PLEDS. Increased dose of carbamazepine to a maintenance of 300 mg.    Interval History:    Overnight pt had and episode of staring and unresponsiveness and was subsequently given ativan 2 mg IV and Keppra 1 g IV. EEG significant for right sided PLEDs. It was explained to the family the plan to increase the doses of home seizure medications.    Review of Systems   Unable to perform ROS: Mental status change     Objective:     Vitals:  Temp: 97.9 °F (36.6 °C) (06/08/17 1600)  Pulse: 68 (06/08/17 1600)  Resp: 14 (06/08/17 1600)  BP: (!) 162/70 (06/08/17 " 1600)  SpO2: 99 % (06/08/17 1600)    Temp:  [97 °F (36.1 °C)-98.6 °F (37 °C)] 97.9 °F (36.6 °C)  Pulse:  [55-82] 68  Resp:  [10-21] 14  SpO2:  [97 %-100 %] 99 %  BP: (109-162)/(55-75) 162/70  Arterial Line BP: (116-184)/(49-80) 159/61              06/07 0701 - 06/08 0700  In: 2537.1 [I.V.:1907.1]  Out: 750 [Urine:750]    Physical Exam         GA: lethargic, comfortable, no acute distress.   HEENT: No scleral icterus or JVD.   Pulmonary: Clear to auscultation A/P/L. No wheezing, crackles, or rhonchi.  Cardiac: RRR S1 & S2 w/o rubs/murmurs/gallops.   Abdominal: Bowel sounds present x 4. No appreciable hepatosplenomegaly.  Skin: No jaundice, rashes, or visible lesions.  Neuro:  --Mental Status:  Lethargic, eye opening to noxious stimuli, does not follow commands. Left gaze preference  --Pupils 3mm, PERRL.   --Corneal reflex, gag, cough intact.  Left facial nerve palsy, UMN pattern.   Withdraws to plain in the RUE and RLE   Left ue and le spasticity and hemiparesis    [place] Coma Scale      Medications:  Continuous    nicardipine Last Rate: Stopped (06/08/17 1100)   Scheduled    amlodipine 10 mg Daily   aspirin 81 mg Daily   carbamazepine 300 mg Q8H   cefTRIAXone (ROCEPHIN) IVPB 1 g Q24H   [START ON 6/9/2017] famotidine 20 mg Daily   heparin (porcine) 5,000 Units Q8H   hydrALAZINE 100 mg Q8H   insulin detemir 10 Units QHS   labetalol 300 mg TID   levetiracetam IVPB 1,000 mg Q12H   lisinopril 10 mg Daily   senna-docusate 8.6-50 mg 1 tablet BID   sodium chloride 0.9% 3 mL Q8H   PRN    dextrose 50% 12.5 g PRN   glucagon (human recombinant) 1 mg PRN   insulin aspart 1-10 Units Q6H PRN   magnesium oxide 800 mg PRN   magnesium oxide 800 mg PRN   potassium chloride 10% 40 mEq PRN   potassium chloride 10% 40 mEq PRN   potassium chloride 10% 60 mEq PRN   potassium, sodium phosphates 2 packet PRN   potassium, sodium phosphates 2 packet PRN   potassium, sodium phosphates 2 packet PRN     Today I personally reviewed pertinent  medications, lines/drains/airways, imaging, lab results, microbiology results,    Assessment/Plan:     Neuro   Seizure disorder    Will continue home regimen of Keppra and Tegretol  F/U EEG given concern for NCSE and continued AMS   EEG consistent with right sided PLEDs, will increase maint dose of tegretol        Cardiac   Essential hypertension    Will resume home medications: amlodipine, labetalol, and hydralazine  Will continue ACE-I given hx of ckd and her kidney function is baseline.        Renal   Type 2 diabetes mellitus with diabetic chronic kidney disease    Will restart home insulin regimen  Levemir 16 units in AM and 10 units in PM  SSI w/ accuchecks.              Activity Orders          Activity as tolerated starting at 06/07 1158        Full Code    Patrice Duenas MD  Neurocritical Care  [facility]

## 2017-06-09 LAB
ALBUMIN SERPL BCP-MCNC: 2.9 G/DL
ALLENS TEST: ABNORMAL
ALP SERPL-CCNC: 109 U/L
ALT SERPL W/O P-5'-P-CCNC: <5 U/L
ANION GAP SERPL CALC-SCNC: 10 MMOL/L
ANION GAP SERPL CALC-SCNC: 21 MMOL/L
ANISOCYTOSIS BLD QL SMEAR: SLIGHT
AST SERPL-CCNC: 11 U/L
BASOPHILS # BLD AUTO: 0.02 K/UL
BASOPHILS NFR BLD: 0.3 %
BILIRUB SERPL-MCNC: 0.2 MG/DL
BUN SERPL-MCNC: 36 MG/DL
BUN SERPL-MCNC: 36 MG/DL
CALCIUM SERPL-MCNC: 8.8 MG/DL
CALCIUM SERPL-MCNC: 8.8 MG/DL
CARBAMAZEPINE SERPL-MCNC: 10.5 UG/ML
CHLORIDE SERPL-SCNC: 113 MMOL/L
CHLORIDE SERPL-SCNC: 114 MMOL/L
CO2 SERPL-SCNC: 20 MMOL/L
CO2 SERPL-SCNC: 20 MMOL/L
CREAT SERPL-MCNC: 1.6 MG/DL
CREAT SERPL-MCNC: 1.7 MG/DL
DELSYS: ABNORMAL
DIFFERENTIAL METHOD: ABNORMAL
EOSINOPHIL # BLD AUTO: 0.2 K/UL
EOSINOPHIL NFR BLD: 3 %
ERYTHROCYTE [DISTWIDTH] IN BLOOD BY AUTOMATED COUNT: 12.7 %
EST. GFR  (AFRICAN AMERICAN): 37.8 ML/MIN/1.73 M^2
EST. GFR  (AFRICAN AMERICAN): 40.7 ML/MIN/1.73 M^2
EST. GFR  (NON AFRICAN AMERICAN): 32.8 ML/MIN/1.73 M^2
EST. GFR  (NON AFRICAN AMERICAN): 35.3 ML/MIN/1.73 M^2
FLOW: 2
GLUCOSE SERPL-MCNC: 125 MG/DL
GLUCOSE SERPL-MCNC: 171 MG/DL
HCO3 UR-SCNC: 20.7 MMOL/L (ref 24–28)
HCT VFR BLD AUTO: 24.2 %
HGB BLD-MCNC: 7.6 G/DL
LDH SERPL L TO P-CCNC: 0.34 MMOL/L (ref 0.36–1.25)
LYMPHOCYTES # BLD AUTO: 0.8 K/UL
LYMPHOCYTES NFR BLD: 12.3 %
MAGNESIUM SERPL-MCNC: 1.8 MG/DL
MCH RBC QN AUTO: 30.2 PG
MCHC RBC AUTO-ENTMCNC: 31.4 %
MCV RBC AUTO: 96 FL
MODE: ABNORMAL
MONOCYTES # BLD AUTO: 0.5 K/UL
MONOCYTES NFR BLD: 7.7 %
NEUTROPHILS # BLD AUTO: 5.1 K/UL
NEUTROPHILS NFR BLD: 76.7 %
PCO2 BLDA: 39.7 MMHG (ref 35–45)
PH SMN: 7.32 [PH] (ref 7.35–7.45)
PHOSPHATE SERPL-MCNC: 3.8 MG/DL
PLATELET # BLD AUTO: 215 K/UL
PLATELET BLD QL SMEAR: ABNORMAL
PMV BLD AUTO: 9.7 FL
PO2 BLDA: 130 MMHG (ref 80–100)
POC BE: -5 MMOL/L
POC SATURATED O2: 99 % (ref 95–100)
POC TCO2: 22 MMOL/L (ref 23–27)
POCT GLUCOSE: 143 MG/DL (ref 70–110)
POCT GLUCOSE: 153 MG/DL (ref 70–110)
POCT GLUCOSE: 189 MG/DL (ref 70–110)
POTASSIUM SERPL-SCNC: 4.3 MMOL/L
POTASSIUM SERPL-SCNC: 4.5 MMOL/L
PROT SERPL-MCNC: 6.4 G/DL
RBC # BLD AUTO: 2.52 M/UL
SAMPLE: ABNORMAL
SITE: ABNORMAL
SODIUM SERPL-SCNC: 144 MMOL/L
SODIUM SERPL-SCNC: 154 MMOL/L
SP02: 99
WBC # BLD AUTO: 6.73 K/UL

## 2017-06-09 PROCEDURE — 84100 ASSAY OF PHOSPHORUS: CPT

## 2017-06-09 PROCEDURE — 99233 SBSQ HOSP IP/OBS HIGH 50: CPT | Mod: ,,, | Performed by: PSYCHIATRY & NEUROLOGY

## 2017-06-09 PROCEDURE — 20000000 HC ICU ROOM

## 2017-06-09 PROCEDURE — 80156 ASSAY CARBAMAZEPINE TOTAL: CPT

## 2017-06-09 PROCEDURE — 63600175 PHARM REV CODE 636 W HCPCS: Performed by: STUDENT IN AN ORGANIZED HEALTH CARE EDUCATION/TRAINING PROGRAM

## 2017-06-09 PROCEDURE — 27000221 HC OXYGEN, UP TO 24 HOURS

## 2017-06-09 PROCEDURE — 85025 COMPLETE CBC W/AUTO DIFF WBC: CPT

## 2017-06-09 PROCEDURE — 82803 BLOOD GASES ANY COMBINATION: CPT

## 2017-06-09 PROCEDURE — 83735 ASSAY OF MAGNESIUM: CPT

## 2017-06-09 PROCEDURE — 95951 PR EEG MONITORING/VIDEORECORD: CPT | Mod: 26,,, | Performed by: PSYCHIATRY & NEUROLOGY

## 2017-06-09 PROCEDURE — 25000003 PHARM REV CODE 250: Performed by: STUDENT IN AN ORGANIZED HEALTH CARE EDUCATION/TRAINING PROGRAM

## 2017-06-09 PROCEDURE — 63600175 PHARM REV CODE 636 W HCPCS: Performed by: NURSE PRACTITIONER

## 2017-06-09 PROCEDURE — 25000003 PHARM REV CODE 250: Performed by: NURSE PRACTITIONER

## 2017-06-09 PROCEDURE — 95957 EEG DIGITAL ANALYSIS: CPT

## 2017-06-09 PROCEDURE — 25000003 PHARM REV CODE 250: Performed by: PSYCHIATRY & NEUROLOGY

## 2017-06-09 PROCEDURE — 83605 ASSAY OF LACTIC ACID: CPT

## 2017-06-09 PROCEDURE — 80053 COMPREHEN METABOLIC PANEL: CPT

## 2017-06-09 PROCEDURE — 99900035 HC TECH TIME PER 15 MIN (STAT)

## 2017-06-09 PROCEDURE — 95951 HC EEG MONITORING/VIDEO RECORD: CPT

## 2017-06-09 PROCEDURE — 37799 UNLISTED PX VASCULAR SURGERY: CPT

## 2017-06-09 PROCEDURE — 80048 BASIC METABOLIC PNL TOTAL CA: CPT

## 2017-06-09 RX ORDER — LISINOPRIL 10 MG/1
10 TABLET ORAL DAILY
Status: DISCONTINUED | OUTPATIENT
Start: 2017-06-10 | End: 2017-06-12

## 2017-06-09 RX ORDER — HYDRALAZINE HYDROCHLORIDE 50 MG/1
50 TABLET, FILM COATED ORAL EVERY 8 HOURS
Status: DISCONTINUED | OUTPATIENT
Start: 2017-06-09 | End: 2017-06-10

## 2017-06-09 RX ORDER — CARBAMAZEPINE 100 MG/1
400 TABLET, CHEWABLE ORAL EVERY 8 HOURS
Status: DISCONTINUED | OUTPATIENT
Start: 2017-06-09 | End: 2017-06-16

## 2017-06-09 RX ADMIN — STANDARDIZED SENNA CONCENTRATE AND DOCUSATE SODIUM 1 TABLET: 8.6; 5 TABLET, FILM COATED ORAL at 09:06

## 2017-06-09 RX ADMIN — Medication 3 ML: at 09:06

## 2017-06-09 RX ADMIN — HYDRALAZINE HYDROCHLORIDE 50 MG: 50 TABLET ORAL at 01:06

## 2017-06-09 RX ADMIN — ASPIRIN 81 MG CHEWABLE TABLET 81 MG: 81 TABLET CHEWABLE at 08:06

## 2017-06-09 RX ADMIN — CARBAMAZEPINE 300 MG: 100 TABLET, CHEWABLE ORAL at 01:06

## 2017-06-09 RX ADMIN — HEPARIN SODIUM 5000 UNITS: 5000 INJECTION, SOLUTION INTRAVENOUS; SUBCUTANEOUS at 09:06

## 2017-06-09 RX ADMIN — LEVETIRACETAM 1000 MG: 10 INJECTION INTRAVENOUS at 09:06

## 2017-06-09 RX ADMIN — LEVETIRACETAM 1000 MG: 10 INJECTION INTRAVENOUS at 08:06

## 2017-06-09 RX ADMIN — STANDARDIZED SENNA CONCENTRATE AND DOCUSATE SODIUM 1 TABLET: 8.6; 5 TABLET, FILM COATED ORAL at 08:06

## 2017-06-09 RX ADMIN — HEPARIN SODIUM 5000 UNITS: 5000 INJECTION, SOLUTION INTRAVENOUS; SUBCUTANEOUS at 01:06

## 2017-06-09 RX ADMIN — Medication 3 ML: at 01:06

## 2017-06-09 RX ADMIN — CARBAMAZEPINE 400 MG: 100 TABLET, CHEWABLE ORAL at 09:06

## 2017-06-09 RX ADMIN — LABETALOL HCL 300 MG: 100 TABLET, FILM COATED ORAL at 09:06

## 2017-06-09 RX ADMIN — INSULIN ASPART 2 UNITS: 100 INJECTION, SOLUTION INTRAVENOUS; SUBCUTANEOUS at 12:06

## 2017-06-09 RX ADMIN — CEFTRIAXONE 1 G: 1 INJECTION, SOLUTION INTRAVENOUS at 12:06

## 2017-06-09 RX ADMIN — Medication 3 ML: at 05:06

## 2017-06-09 RX ADMIN — HYDRALAZINE HYDROCHLORIDE 100 MG: 50 TABLET ORAL at 05:06

## 2017-06-09 RX ADMIN — LISINOPRIL 10 MG: 10 TABLET ORAL at 08:06

## 2017-06-09 RX ADMIN — LABETALOL HCL 300 MG: 100 TABLET, FILM COATED ORAL at 01:06

## 2017-06-09 RX ADMIN — AMLODIPINE BESYLATE 10 MG: 10 TABLET ORAL at 08:06

## 2017-06-09 RX ADMIN — HYDRALAZINE HYDROCHLORIDE 50 MG: 50 TABLET ORAL at 09:06

## 2017-06-09 RX ADMIN — LABETALOL HCL 300 MG: 100 TABLET, FILM COATED ORAL at 05:06

## 2017-06-09 RX ADMIN — HEPARIN SODIUM 5000 UNITS: 5000 INJECTION, SOLUTION INTRAVENOUS; SUBCUTANEOUS at 05:06

## 2017-06-09 RX ADMIN — FAMOTIDINE 20 MG: 20 TABLET, FILM COATED ORAL at 05:06

## 2017-06-09 RX ADMIN — INSULIN DETEMIR 10 UNITS: 100 INJECTION, SOLUTION SUBCUTANEOUS at 09:06

## 2017-06-09 RX ADMIN — CARBAMAZEPINE 300 MG: 100 TABLET, CHEWABLE ORAL at 05:06

## 2017-06-09 NOTE — SUBJECTIVE & OBJECTIVE
Interval History:    At time of assessment, pt had a left gaze preference, right arm posturing, and groaning. She continues to have limited interaction but is now moving the right side spontaneously and has eyes open. There was no electrographic correlate with these events.     Review of Systems   Unable to perform ROS: Mental status change     Objective:     Vitals:  Temp: 98.4 °F (36.9 °C) (06/09/17 1305)  Pulse: 78 (06/09/17 1305)  Resp: (!) 5 (06/09/17 1305)  BP: (!) 170/72 (06/09/17 1305)  SpO2: 98 % (06/09/17 1305)    Temp:  [97.3 °F (36.3 °C)-98.6 °F (37 °C)] 98.4 °F (36.9 °C)  Pulse:  [58-84] 78  Resp:  [2-25] 5  SpO2:  [96 %-99 %] 98 %  BP: (104-198)/() 170/72  Arterial Line BP: ()/(34-75) 175/69              06/08 0701 - 06/09 0700  In: 2136.3 [I.V.:416.3]  Out: 250 [Urine:250]    Physical Exam         GA: lethargic, comfortable, no acute distress.   HEENT: No scleral icterus or JVD.   Pulmonary: Clear to auscultation A/P/L. No wheezing, crackles, or rhonchi.  Cardiac: RRR S1 & S2 w/o rubs/murmurs/gallops.   Abdominal: Bowel sounds present x 4. No appreciable hepatosplenomegaly.  Skin: No jaundice, rashes, or visible lesions.  Neuro:  --Mental Status:  Lethargic, eye opening spontaneously, does not follow commands.  --Pupils 3mm, PERRL.   --Corneal reflex, gag, cough intact.  Left facial nerve palsy, UMN pattern.   Withdraws to plain in the RUE and RLE   Left ue and le spasticity and hemiparesis    [place] Coma Scale      Medications:  Continuous   Scheduled    amlodipine 10 mg Daily   aspirin 81 mg Daily   carbamazepine 300 mg Q8H   cefTRIAXone (ROCEPHIN) IVPB 1 g Q24H   famotidine 20 mg Daily   heparin (porcine) 5,000 Units Q8H   hydrALAZINE 50 mg Q8H   insulin detemir 10 Units QHS   labetalol 300 mg TID   levetiracetam IVPB 1,000 mg Q12H   lisinopril 10 mg Daily   senna-docusate 8.6-50 mg 1 tablet BID   sodium chloride 0.9% 3 mL Q8H   PRN    dextrose 50% 12.5 g PRN   glucagon (human  recombinant) 1 mg PRN   insulin aspart 1-10 Units Q6H PRN   magnesium oxide 800 mg PRN   magnesium oxide 800 mg PRN   potassium chloride 10% 40 mEq PRN   potassium chloride 10% 40 mEq PRN   potassium chloride 10% 60 mEq PRN   potassium, sodium phosphates 2 packet PRN   potassium, sodium phosphates 2 packet PRN   potassium, sodium phosphates 2 packet PRN     Today I personally reviewed pertinent medications, lines/drains/airways, imaging, lab results, microbiology results,

## 2017-06-09 NOTE — PROGRESS NOTES
ICU Attending Note  Neurocritical Care    cEEG R PLED    [id number]  At times has episodes of moaning, staring, L gaze deviation, R arm posture, with R PLED on cEEG but no obvious seizure    -cEEG  -levetiracetam, carbamazepine  -aspirin  --220  -amlodipine, lisinopril, decrease hydralazine 50 mg q8h  -repeat basic, check ABG, check lactate  -likely needs Bourgeois  -ceftriaxone continue for GNR UTI  -HGB >7  -TF  -detemir, ISS  -prophylaxis    Goal: Continue management of epilepsy, HTN. Determine cause of hypernatremia, AG.    ADDENDUM  Review of cEEG with Dr Mcintosh indicates no clear seizures but possibly worsening R PLED, at times fluctuating but not in sync with abnormal postures. I discussed case with her . He says her posturing and staring occurs a lot at home. He feels her condition is improving toward her baseline. Currently, it is unclear to me if the R PLED indicate an active epileptic process or known structural injury with increased overall arousal. Increase carbamazepine slightly, continue levetiracetam, continue cEEG. If seizures develop or condition persists and EEG worsens, escalate anticonvulsants with lacosamide 400 mg IV then 100 mg IV q12h. If seizures do not develop or condition improves, continue current anticonvulsants. Attempt to avoid benzodiazepines unless blatant seizures emerge.

## 2017-06-09 NOTE — PROGRESS NOTES
Pt Shara BP 100s/40s MAP60. Cuff /51 MAP71 Notified ROHAN Loaiza. Ok to hold 2100 BP meds. Continue to monitor BP.

## 2017-06-09 NOTE — ASSESSMENT & PLAN NOTE
Will restart home insulin regimen  Cont Levemir 10 units in PM and hold AM dose.  SSI w/ accuchecks.

## 2017-06-09 NOTE — ASSESSMENT & PLAN NOTE
Will continue home regimen of Keppra and Tegretol  F/U EEG given concern for NCSE and continued AMS   EEG consistent with right sided PLEDs, will increase maint dose of tegretol  On exam today pt had groaning with RUE posturing and a left gaze deviation.  These episodes did not have an electrographic correlate on cEEG.

## 2017-06-09 NOTE — PROGRESS NOTES
"[facility]  Neurocritical Care  Progress Note    Admit Date: 6/6/2017  Service Date: 06/09/2017  Length of Stay: 3    Subjective:     Chief Complaint: <principal problem not specified>    History of Present Illness: Lucy Perez is a 57 yo F with PMH of CVA in August and November with residual left sided weakness, CHF, Dm, HLD, HTN, sarcoid, Ra, CKD 3 presenting with aphasia, unresponsiveness and poor motor effort since 9 AM today. The patient's family reports that the patient has had an elevated BP to the 180s for the past week. They report that at times the patient "will be sleepy" and poorly responsive and usually improves within a few hours. However,  At about 2pm she was found to be staring up the ceiling and remained unresponsive.  The family attempted to feed the patient at lunch through her PEG tube but the patient vomited shortly thereafter.   Her baseline is described as dependent for activities of daily living, verbal and interactive with limited mobility due to left hemiparesis.   She had hemicranectomy for stroke treatment in 2016. She is currently on keppra and tegretol and per family was started post stroke for seizure prophylaxis. No history of seizure disorder per patient's daughter.    Hospital Course: 6/7: Continued somnolence and altered mental status, F/U EEG with epilepsy due to concern for NCSE  6/8: EEG: right sided PLEDS. Increased dose of carbamazepine to a maintenance of 300 mg.    Interval History:    At time of assessment, pt had a left gaze preference, right arm posturing, and groaning. She continues to have limited interaction but is now moving the right side spontaneously and has eyes open. There was no electrographic correlate with these events.     Review of Systems   Unable to perform ROS: Mental status change     Objective:     Vitals:  Temp: 98.4 °F (36.9 °C) (06/09/17 1305)  Pulse: 78 (06/09/17 1305)  Resp: (!) 5 (06/09/17 1305)  BP: (!) 170/72 (06/09/17 " 1305)  SpO2: 98 % (06/09/17 1305)    Temp:  [97.3 °F (36.3 °C)-98.6 °F (37 °C)] 98.4 °F (36.9 °C)  Pulse:  [58-84] 78  Resp:  [2-25] 5  SpO2:  [96 %-99 %] 98 %  BP: (104-198)/() 170/72  Arterial Line BP: ()/(34-75) 175/69              06/08 0701 - 06/09 0700  In: 2136.3 [I.V.:416.3]  Out: 250 [Urine:250]    Physical Exam         GA: lethargic, comfortable, no acute distress.   HEENT: No scleral icterus or JVD.   Pulmonary: Clear to auscultation A/P/L. No wheezing, crackles, or rhonchi.  Cardiac: RRR S1 & S2 w/o rubs/murmurs/gallops.   Abdominal: Bowel sounds present x 4. No appreciable hepatosplenomegaly.  Skin: No jaundice, rashes, or visible lesions.  Neuro:  --Mental Status:  Lethargic, eye opening spontaneously, does not follow commands.  --Pupils 3mm, PERRL.   --Corneal reflex, gag, cough intact.  Left facial nerve palsy, UMN pattern.   Withdraws to plain in the RUE and RLE   Left ue and le spasticity and hemiparesis    [place] Coma Scale      Medications:  Continuous   Scheduled    amlodipine 10 mg Daily   aspirin 81 mg Daily   carbamazepine 300 mg Q8H   cefTRIAXone (ROCEPHIN) IVPB 1 g Q24H   famotidine 20 mg Daily   heparin (porcine) 5,000 Units Q8H   hydrALAZINE 50 mg Q8H   insulin detemir 10 Units QHS   labetalol 300 mg TID   levetiracetam IVPB 1,000 mg Q12H   lisinopril 10 mg Daily   senna-docusate 8.6-50 mg 1 tablet BID   sodium chloride 0.9% 3 mL Q8H   PRN    dextrose 50% 12.5 g PRN   glucagon (human recombinant) 1 mg PRN   insulin aspart 1-10 Units Q6H PRN   magnesium oxide 800 mg PRN   magnesium oxide 800 mg PRN   potassium chloride 10% 40 mEq PRN   potassium chloride 10% 40 mEq PRN   potassium chloride 10% 60 mEq PRN   potassium, sodium phosphates 2 packet PRN   potassium, sodium phosphates 2 packet PRN   potassium, sodium phosphates 2 packet PRN     Today I personally reviewed pertinent medications, lines/drains/airways, imaging, lab results, microbiology results,    Assessment/Plan:      Neuro   Seizure disorder    Will continue home regimen of Keppra and Tegretol  F/U EEG given concern for NCSE and continued AMS   EEG consistent with right sided PLEDs, will increase maint dose of tegretol  On exam today pt had groaning with RUE posturing and a left gaze deviation.  These episodes did not have an electrographic correlate on cEEG.        Cardiac   Essential hypertension    Will resume home medications: amlodipine, labetalol, lisinopril, and hydralazine  Decrease hydralazine 50 mg q8h          Renal   Type 2 diabetes mellitus with diabetic chronic kidney disease    Will restart home insulin regimen  Cont Levemir 10 units in PM and hold AM dose.  SSI w/ accuchecks.            Prophylaxis:  Venous Thromboembolism: mechanical chemical  Stress Ulcer: H2B  Ventilator Pneumonia: not applicable     Activity Orders          Activity as tolerated starting at 06/07 1158        Full Code    Patrice Duenas MD   [employer] Neurology PGY 2  Neurocritical Care  [facility]

## 2017-06-09 NOTE — PLAN OF CARE
Problem: Patient Care Overview  Goal: Plan of Care Review  Outcome: Ongoing (interventions implemented as appropriate)  POC reviewed with pt and pt's family. All questions and concerns were addressed. See flowsheets for full assessments and additional documentation. No acute events throughout shift.

## 2017-06-09 NOTE — ASSESSMENT & PLAN NOTE
Will resume home medications: amlodipine, labetalol, lisinopril, and hydralazine  Decrease hydralazine 50 mg q8h

## 2017-06-09 NOTE — PROCEDURES
DATE OF SERVICE:  06/07/2017    ICU EEG/VIDEO MONITORING REPORT    METHODOLOGY:  Electroencephalographic (EEG) is recorded with electrodes placed   according to the International 10-20 placement system.  Thirty two (32) channels   of digital signal (sampling rate of 512/sec), including T1 and T2, were   simultaneously recorded from the scalp and may include EKG, EMG, and/or eye   monitors.  Recording band pass was 0.1 to 512 Hz.  Digital video recording of   the patient is simultaneously recorded with the EEG.  The patient is instructed   to report clinical symptoms which may occur during the recording session.  EEG   and video recording are stored and archived in digital format.  Activation   procedures, which include photic stimulation, hyperventilation and instructing   patients to perform simple tasks, are done in selected patients.    The EEG is displayed on a monitor screen and can be reviewed using different   montages.  Computer-assisted analysis is employed to detect spike and   electrographic seizure activity.   The entire record is submitted for computer   analysis.  The entire recording is visually reviewed, and the times identified   by computer analysis as being spikes or seizures are reviewed again.      Compressed spectral analysis (CSA) is also performed on the activity recorded   from each individual channel.  This is displayed as a power display of   frequencies from 0 to 30 Hz over time.   The CSA is reviewed looking for   asymmetries in power between homologous areas of the scalp, then compared with   the original EEG recording.      [employer] customer insight software was also utilized in the review of this study.  This software   suite analyzes the EEG recording in multiple domains.  Coherence and rhythmicity   are computed to identify EEG sections which may contain organized seizures.    Each channel undergoes analysis to detect the presence of spike and sharp waves   which have special and morphological  characteristics of epileptic activity.  The   routine EEG recording is converted from special into frequency domain.  This is   then displayed comparing homologous areas to identify areas of significant   asymmetry.  Algorithm to identify non-cortically generated artifact is used to   separate artifact from the EEG.      RECORDING TIMES:  Duration of this portion of the study is 26 hours and 40 minutes.  Study begins on 06/07/2017 at 0024 and pauses on 06/07/2017 from 08:19 to 11:56   and then resumes and concludes on 06/08/2017 at 0700.  The first portion of the   study is done with electrode cap and is of marginal technical quality.    FINDINGS:  The electrode cap portion of this study reveals a diffusely slow   background with EMG artifact dominating the left hemisphere in particular and   the overall background consisting of predominantly delta activity at times and   light burst-suppression pattern with periodic epileptiform discharges seen in   the bursts that are right hemispheric dominant.  The burst-suppression ratio is   approximately 1 to 5.  When the discharges are present at other times, diffuse   slowing only is seen.  There were no rhythmic runs or seizure activity observed   during the electrode cap portion.    Starting at 11:56 on June 7th, the full look up commences with a pattern very   similar to what was seen in the electrode cap with left hemispheric EMG artifact   being present and right hemispheric epileptiform discharges seen at times in a   PLED like formation with discharges every few seconds.  There is an element of   burst suppression on the majority of the time to the EEG.  On a few occasions,   the PLEDs become somewhat rhythmic for brief periods and clearly possess   epileptogenic potential.  These rhythmic periods are only during times of   maximum alertness and stimulation.  The majority of the time the   burst-suppression pattern with the PLEDs predominates.  There is no major    evolution from the beginning to the end of the study and at the conclusion,   diffuse slowing with right hemispheric PLED seen every 3 to 5 seconds is   present.    INTERPRETATION:  Abnormal EEG due to the presence of moderately severe diffuse   slowing and right hemispheric periodic and intermittent epileptiform discharges   seen as described above.  No overt seizure activity is seen, although high   epileptogenic potential is seen in these discharges, which are present   throughout most of this recording.  Results were communicated to Critical Care   Team in real time.      KIM  dd: 06/08/2017 11:40:00 (CDT)  td: 06/08/2017 21:30:30 (CDT)  Doc ID   #[id number]  Job ID #[id number]    CC:

## 2017-06-10 PROBLEM — D63.8 ANEMIA OF CHRONIC DISEASE: Status: ACTIVE | Noted: 2017-06-10

## 2017-06-10 PROBLEM — G40.209 PARTIAL SYMPTOMATIC EPILEPSY WITH COMPLEX PARTIAL SEIZURES, NOT INTRACTABLE, WITHOUT STATUS EPILEPTICUS: Status: ACTIVE | Noted: 2017-06-06

## 2017-06-10 PROBLEM — R40.2420 GLASGOW COMA SCALE TOTAL SCORE 9-12: Status: ACTIVE | Noted: 2017-06-10

## 2017-06-10 LAB
ABO + RH BLD: NORMAL
ALBUMIN SERPL BCP-MCNC: 2.8 G/DL
ALP SERPL-CCNC: 104 U/L
ALT SERPL W/O P-5'-P-CCNC: <5 U/L
ANION GAP SERPL CALC-SCNC: 9 MMOL/L
AST SERPL-CCNC: 14 U/L
BACTERIA UR CULT: NORMAL
BASOPHILS # BLD AUTO: 0.01 K/UL
BASOPHILS NFR BLD: 0.2 %
BILIRUB SERPL-MCNC: 0.2 MG/DL
BLD GP AB SCN CELLS X3 SERPL QL: NORMAL
BLD PROD TYP BPU: NORMAL
BLOOD UNIT EXPIRATION DATE: NORMAL
BLOOD UNIT TYPE CODE: 5100
BLOOD UNIT TYPE: NORMAL
BUN SERPL-MCNC: 37 MG/DL
CALCIUM SERPL-MCNC: 8.9 MG/DL
CHLORIDE SERPL-SCNC: 114 MMOL/L
CO2 SERPL-SCNC: 23 MMOL/L
CODING SYSTEM: NORMAL
CREAT SERPL-MCNC: 1.6 MG/DL
DIFFERENTIAL METHOD: ABNORMAL
DISPENSE STATUS: NORMAL
EOSINOPHIL # BLD AUTO: 0.3 K/UL
EOSINOPHIL NFR BLD: 5.4 %
ERYTHROCYTE [DISTWIDTH] IN BLOOD BY AUTOMATED COUNT: 12.8 %
EST. GFR  (AFRICAN AMERICAN): 40.7 ML/MIN/1.73 M^2
EST. GFR  (NON AFRICAN AMERICAN): 35.3 ML/MIN/1.73 M^2
GLUCOSE SERPL-MCNC: 157 MG/DL
HCT VFR BLD AUTO: 21.7 %
HGB BLD-MCNC: 6.7 G/DL
LYMPHOCYTES # BLD AUTO: 1 K/UL
LYMPHOCYTES NFR BLD: 17.7 %
MAGNESIUM SERPL-MCNC: 1.8 MG/DL
MCH RBC QN AUTO: 29.9 PG
MCHC RBC AUTO-ENTMCNC: 30.9 %
MCV RBC AUTO: 97 FL
MONOCYTES # BLD AUTO: 0.4 K/UL
MONOCYTES NFR BLD: 7.2 %
NEUTROPHILS # BLD AUTO: 3.9 K/UL
NEUTROPHILS NFR BLD: 69 %
PHOSPHATE SERPL-MCNC: 3.1 MG/DL
PLATELET # BLD AUTO: 198 K/UL
PMV BLD AUTO: 9.9 FL
POCT GLUCOSE: 130 MG/DL (ref 70–110)
POCT GLUCOSE: 147 MG/DL (ref 70–110)
POCT GLUCOSE: 163 MG/DL (ref 70–110)
POCT GLUCOSE: 176 MG/DL (ref 70–110)
POCT GLUCOSE: 177 MG/DL (ref 70–110)
POCT GLUCOSE: 186 MG/DL (ref 70–110)
POTASSIUM SERPL-SCNC: 4.5 MMOL/L
PROT SERPL-MCNC: 6.1 G/DL
RBC # BLD AUTO: 2.24 M/UL
SODIUM SERPL-SCNC: 146 MMOL/L
TRANS ERYTHROCYTES VOL PATIENT: NORMAL ML
WBC # BLD AUTO: 5.58 K/UL

## 2017-06-10 PROCEDURE — 84100 ASSAY OF PHOSPHORUS: CPT

## 2017-06-10 PROCEDURE — 20000000 HC ICU ROOM

## 2017-06-10 PROCEDURE — 25000003 PHARM REV CODE 250: Performed by: STUDENT IN AN ORGANIZED HEALTH CARE EDUCATION/TRAINING PROGRAM

## 2017-06-10 PROCEDURE — 63600175 PHARM REV CODE 636 W HCPCS: Performed by: PSYCHIATRY & NEUROLOGY

## 2017-06-10 PROCEDURE — P9021 RED BLOOD CELLS UNIT: HCPCS

## 2017-06-10 PROCEDURE — 27000221 HC OXYGEN, UP TO 24 HOURS

## 2017-06-10 PROCEDURE — 63600175 PHARM REV CODE 636 W HCPCS: Performed by: STUDENT IN AN ORGANIZED HEALTH CARE EDUCATION/TRAINING PROGRAM

## 2017-06-10 PROCEDURE — 25000003 PHARM REV CODE 250: Performed by: PSYCHIATRY & NEUROLOGY

## 2017-06-10 PROCEDURE — 80053 COMPREHEN METABOLIC PANEL: CPT

## 2017-06-10 PROCEDURE — 99233 SBSQ HOSP IP/OBS HIGH 50: CPT | Mod: ,,, | Performed by: PHYSICIAN ASSISTANT

## 2017-06-10 PROCEDURE — 83735 ASSAY OF MAGNESIUM: CPT

## 2017-06-10 PROCEDURE — 85025 COMPLETE CBC W/AUTO DIFF WBC: CPT

## 2017-06-10 PROCEDURE — 63600175 PHARM REV CODE 636 W HCPCS: Performed by: NURSE PRACTITIONER

## 2017-06-10 PROCEDURE — 25000003 PHARM REV CODE 250: Performed by: NURSE PRACTITIONER

## 2017-06-10 PROCEDURE — 86901 BLOOD TYPING SEROLOGIC RH(D): CPT

## 2017-06-10 PROCEDURE — 86920 COMPATIBILITY TEST SPIN: CPT

## 2017-06-10 PROCEDURE — 95957 EEG DIGITAL ANALYSIS: CPT

## 2017-06-10 PROCEDURE — 99233 SBSQ HOSP IP/OBS HIGH 50: CPT | Mod: ,,, | Performed by: PSYCHIATRY & NEUROLOGY

## 2017-06-10 PROCEDURE — 95951 PR EEG MONITORING/VIDEORECORD: CPT | Mod: 26,,, | Performed by: PSYCHIATRY & NEUROLOGY

## 2017-06-10 PROCEDURE — 95951 HC EEG MONITORING/VIDEO RECORD: CPT

## 2017-06-10 PROCEDURE — 86900 BLOOD TYPING SEROLOGIC ABO: CPT

## 2017-06-10 RX ORDER — LEVETIRACETAM 10 MG/ML
1000 INJECTION INTRAVASCULAR ONCE
Status: COMPLETED | OUTPATIENT
Start: 2017-06-10 | End: 2017-06-10

## 2017-06-10 RX ORDER — HYDROCODONE BITARTRATE AND ACETAMINOPHEN 500; 5 MG/1; MG/1
TABLET ORAL
Status: DISCONTINUED | OUTPATIENT
Start: 2017-06-10 | End: 2017-06-22 | Stop reason: HOSPADM

## 2017-06-10 RX ORDER — LEVETIRACETAM 15 MG/ML
1500 INJECTION INTRAVASCULAR EVERY 12 HOURS
Status: DISCONTINUED | OUTPATIENT
Start: 2017-06-10 | End: 2017-06-14

## 2017-06-10 RX ADMIN — Medication 3 ML: at 01:06

## 2017-06-10 RX ADMIN — Medication 3 ML: at 05:06

## 2017-06-10 RX ADMIN — LISINOPRIL 10 MG: 10 TABLET ORAL at 08:06

## 2017-06-10 RX ADMIN — STANDARDIZED SENNA CONCENTRATE AND DOCUSATE SODIUM 1 TABLET: 8.6; 5 TABLET, FILM COATED ORAL at 09:06

## 2017-06-10 RX ADMIN — INSULIN DETEMIR 10 UNITS: 100 INJECTION, SOLUTION SUBCUTANEOUS at 09:06

## 2017-06-10 RX ADMIN — INSULIN ASPART 2 UNITS: 100 INJECTION, SOLUTION INTRAVENOUS; SUBCUTANEOUS at 05:06

## 2017-06-10 RX ADMIN — INSULIN ASPART 2 UNITS: 100 INJECTION, SOLUTION INTRAVENOUS; SUBCUTANEOUS at 11:06

## 2017-06-10 RX ADMIN — HYDRALAZINE HYDROCHLORIDE 75 MG: 50 TABLET ORAL at 01:06

## 2017-06-10 RX ADMIN — HEPARIN SODIUM 5000 UNITS: 5000 INJECTION, SOLUTION INTRAVENOUS; SUBCUTANEOUS at 05:06

## 2017-06-10 RX ADMIN — ASPIRIN 81 MG CHEWABLE TABLET 81 MG: 81 TABLET CHEWABLE at 08:06

## 2017-06-10 RX ADMIN — AMLODIPINE BESYLATE 10 MG: 10 TABLET ORAL at 08:06

## 2017-06-10 RX ADMIN — LABETALOL HCL 300 MG: 100 TABLET, FILM COATED ORAL at 05:06

## 2017-06-10 RX ADMIN — HYDRALAZINE HYDROCHLORIDE 50 MG: 50 TABLET ORAL at 05:06

## 2017-06-10 RX ADMIN — HYDRALAZINE HYDROCHLORIDE 75 MG: 50 TABLET ORAL at 09:06

## 2017-06-10 RX ADMIN — FAMOTIDINE 20 MG: 20 TABLET, FILM COATED ORAL at 05:06

## 2017-06-10 RX ADMIN — STANDARDIZED SENNA CONCENTRATE AND DOCUSATE SODIUM 1 TABLET: 8.6; 5 TABLET, FILM COATED ORAL at 08:06

## 2017-06-10 RX ADMIN — HEPARIN SODIUM 5000 UNITS: 5000 INJECTION, SOLUTION INTRAVENOUS; SUBCUTANEOUS at 01:06

## 2017-06-10 RX ADMIN — CARBAMAZEPINE 400 MG: 100 TABLET, CHEWABLE ORAL at 05:06

## 2017-06-10 RX ADMIN — LEVETIRACETAM 1000 MG: 10 INJECTION INTRAVENOUS at 08:06

## 2017-06-10 RX ADMIN — HEPARIN SODIUM 5000 UNITS: 5000 INJECTION, SOLUTION INTRAVENOUS; SUBCUTANEOUS at 09:06

## 2017-06-10 RX ADMIN — Medication 3 ML: at 09:06

## 2017-06-10 RX ADMIN — LABETALOL HCL 300 MG: 100 TABLET, FILM COATED ORAL at 09:06

## 2017-06-10 RX ADMIN — LABETALOL HCL 300 MG: 100 TABLET, FILM COATED ORAL at 01:06

## 2017-06-10 RX ADMIN — LEVETIRACETAM 1000 MG: 10 INJECTION INTRAVENOUS at 09:06

## 2017-06-10 RX ADMIN — CEFTRIAXONE 1 G: 1 INJECTION, SOLUTION INTRAVENOUS at 02:06

## 2017-06-10 RX ADMIN — LEVETIRACETAM 1500 MG: 15 INJECTION INTRAVENOUS at 09:06

## 2017-06-10 RX ADMIN — CARBAMAZEPINE 400 MG: 100 TABLET, CHEWABLE ORAL at 01:06

## 2017-06-10 RX ADMIN — CARBAMAZEPINE 400 MG: 100 TABLET, CHEWABLE ORAL at 09:06

## 2017-06-10 NOTE — SUBJECTIVE & OBJECTIVE
Neurologic Chief Complaint: aphasia    Subjective:     Interval History: Patient is seen for follow-up neurological assessment and treatment recommendations: transfused 1 unit prbc, still on continuous EEG.   NCC adjusting AEDs  Patient more alert on afternoon rounds, did wiggle toes to command      HPI, Past Medical, Family, and Social History remains the same as documented in the initial encounter.     Review of Systems   Constitutional: Negative for chills and fever.   Gastrointestinal: Negative for vomiting.   Neurological: Positive for seizures, speech difficulty and weakness. Negative for numbness.   Psychiatric/Behavioral: Negative for agitation.     Scheduled Meds:   amlodipine  10 mg Per G Tube Daily    aspirin  81 mg Per G Tube Daily    carbamazepine  400 mg Per G Tube Q8H    cefTRIAXone (ROCEPHIN) IVPB  1 g Intravenous Q24H    famotidine  20 mg Per G Tube Daily    heparin (porcine)  5,000 Units Subcutaneous Q8H    hydrALAZINE  75 mg Per G Tube Q8H    insulin detemir  10 Units Subcutaneous QHS    labetalol  300 mg Per G Tube TID    levetiracetam IVPB  1,500 mg Intravenous Q12H    lisinopril  10 mg Per NG tube Daily    senna-docusate 8.6-50 mg  1 tablet Per G Tube BID    sodium chloride 0.9%  3 mL Intravenous Q8H     Continuous Infusions:     PRN Meds:    Objective:     Vital Signs (Most Recent):  Temp: 98.1 °F (36.7 °C) (06/10/17 1800)  Pulse: 76 (06/10/17 1800)  Resp: (!) 23 (06/10/17 1800)  BP: (!) 110/53 (06/10/17 0400)  SpO2: 97 % (06/10/17 1800)  BP Location: Right arm    Vital Signs Range (Last 24H):  Temp:  [97.7 °F (36.5 °C)-99 °F (37.2 °C)]   Pulse:  [56-80]   Resp:  [7-25]   BP: (110-177)/()   SpO2:  [97 %-100 %]   Arterial Line BP: (115-187)/(42-84)   BP Location: Right arm    Physical Exam   Constitutional: She appears well-developed and well-nourished. No distress.   HENT:   Head: Atraumatic.   Eyes: Pupils are equal, round, and reactive to light.   Cardiovascular: Normal  rate.    Pulmonary/Chest: Effort normal. No respiratory distress.   Skin: Skin is warm and dry.       Neurological Exam:   LOC: this am, difficult to arouse from sleep, during pm rounds patient more alert and interactive, follows command to wiggle toes only   Language: attempts to phonate. Family report very little to no verbal output   Memory: Mute   Pupils (CN III, IV, VI): R pupil 2 mm, reactive Yes, brisk. L pupil 2 mm, reactive Yes, brisk.  Facial Movement (CN VII): symmetric facial expression  Motor*: Arm Left:  Paretic:  1/5, Leg Left:   Paretic:  1/5, Arm Right:   Paretic:  2/5, Leg Right:   Paretic:  2/5  Left side with spastic paralysis from old stroke     NIH Stroke Scale:    Level of Consciousness: 0 - alert  LOC Questions: 2 - answers none correctly  LOC Commands: 2 - performs neither correctly  Best Gaze: 0 - normal  Visual: 0 - no visual loss  Facial Palsy: 0 - normal  Motor Left Arm: 3 - no effort against gravity  Motor Right Arm: 3 - no effort against gravity  Motor Left Leg: 3 - no effort against gravity  Motor Right Leg: 3 - no effort against gravity  Limb Ataxia: 0 - absent  Sensory: 0 - normal  Best Language: 2 - severe aphasia  Dysarthria: 2 - near to unintelligible  Extinction and Inattention: 1 - partial neglect  NIH Stroke Scale Total: 21      Laboratory:  CMP:     Recent Labs  Lab 06/10/17  0308   CALCIUM 8.9   ALBUMIN 2.8*   PROT 6.1   *   K 4.5   CO2 23   *   BUN 37*   CREATININE 1.6*   ALKPHOS 104   ALT <5*   AST 14   BILITOT 0.2     CBC:     Recent Labs  Lab 06/10/17  0308   WBC 5.58   RBC 2.24*   HGB 6.7*   HCT 21.7*      MCV 97   MCH 29.9   MCHC 30.9*     Lipid Panel: No results for input(s): CHOL, LDLCALC, HDL, TRIG in the last 168 hours.  Coagulation:     Recent Labs  Lab 06/06/17  2153   INR 0.9   APTT <21.0     Platelet Aggregation Study: No results for input(s): PLTAGG, PLTAGINTERP, PLTAGREGLACO, ADPPLTAGGREG in the last 168 hours.  Hgb A1C:     Recent  Labs  Lab 06/07/17  0113   HGBA1C 5.1     TSH:     Recent Labs  Lab 06/06/17  2153   TSH 1.355       Diagnostic Results:  I have personally reviewed:   Findings:     CT Head. Date: 06/06/17  Interval cranioplasty in this patient with a history of right cerebral hemorrhagic infarct. No acute major territorial infarction or hemorrhage.    Left corona radiata lacunar infarct, likely remote although new since the prior exam.    Senescent changes.    Echo 10/2016  Severely enlarged LA   1 - Normal left ventricular systolic function (EF 65-70%).     2 - Left ventricular diastolic dysfunction.     3 - Biatrial enlargement.     4 - Normal right ventricular systolic function .     5 - Trivial to mild tricuspid regurgitation.     6 - Low central venous pressure.     7 - The estimated PA systolic pressure is 35 mmHg.

## 2017-06-10 NOTE — ASSESSMENT & PLAN NOTE
Per NCC - Will resume home medications: amlodipine, labetalol, and hydralazine  Will hold Lisinopril in setting of DARLING.  Stroke risk factor.  Plan as above.

## 2017-06-10 NOTE — PLAN OF CARE
Problem: Patient Care Overview  Goal: Plan of Care Review  Outcome: Ongoing (interventions implemented as appropriate)  Plan of care reviewed with pt and pt's .  Pt's  verbalized understanding of the plan of care.  Continuous EEG in progress.  1 unit PRBC's given.  Pt remained free of any falls or injuries during this shift, skin integrity was maintained.  Pt had episode of vomiting tube feeds this evening, feeds held at this time, Fadia Shukla NP notified.  Will continue to monitor.

## 2017-06-10 NOTE — CONSULTS
Spoke with RN about midline consult, informed her we would not get to midline today due to PICC line insertions, to re consult if additional access is needed on Monday. Pt currently has two working PIV's.

## 2017-06-10 NOTE — PROGRESS NOTES
Progress Note  Neurocritical Care    Admit Date: 6/6/2017  Service Date: 06/10/2017   Length of Stay: 4    Chief Complaint: Partial symptomatic epilepsy with complex partial seizures, not intractable, without status epilepticus    History of Present Illness: 56 y woman who presents with aphasia, unresponsiveness. Past R MCA stroke post hemicraniectomy, possibly complicated by Epilepsy, CHF, DM2, HTN, HLD, sarcoid, RA, CKD.     Hospital Course: 6/7 admission to [facility]  6/8 R PLED-> increased carbamazepine, levetiracetam  6/9 waxing/waning R PLED but no seizure     Interval History: Increased carbamazepine. Decreased antihypertensives. cEEG continued. HGB decreased.    Review of Systems: Unable to obtain a complete ROS due to level of consciousness.     Vitals:   Temp: 97.9 °F (36.6 °C) (06/10/17 0800)  Pulse: 69 (06/10/17 0800)  Resp: 17 (06/10/17 0800)  BP: (!) 110/53 (06/10/17 0400)  SpO2: 99 % (06/10/17 0800)    Temp:  [97.7 °F (36.5 °C)-99 °F (37.2 °C)] 97.9 °F (36.6 °C)  Pulse:  [56-84] 69  Resp:  [5-47] 17  SpO2:  [97 %-100 %] 99 %  BP: (110-177)/() 110/53  Arterial Line BP: (115-187)/(42-75) 160/67    06/09 0701 - 06/10 0700  In: 1670 [I.V.:40]  Out: 540 [Urine:485; Drains:55]     Examination:   Constitutional: Well-nourished and -developed. No apparent distress.   Eyes: Conjunctiva clear, anicteric. Lids no lesions.  Head/Ears/Nose/Mouth/Throat/Neck: Moist mucous membranes. External ears, nose atraumatic.   Cardiovascular: Regular rhythm. No murmurs. No leg edema.  Respiratory: Comfortable respirations. Clear to auscultation.  Gastrointestinal: No hernia. Soft, nondistended, nontender. + bowel sounds.    Neurologic:  -GCS E3V2M5  -Opens eyes to voice. Moans. Otherwise does not speak or follow/mimic.  -Cranial nerves impaired, particularly pupils reactive, oculocephalics intact, corneals intact, L facial droop, does not stick out tongue.  -Motor to pain 5 in R arm and leg, spastic hemiplegia in L arm and  leg.  -Sensation intact to pain in all extremities by reaction.  Unable to test orientation, language, memory, judgment, insight, fund of knowledge, hearing, shoulder shrug, tongue protrusion, coordination, gait due to level of consciousness.    Medications:   Scheduled  amlodipine 10 mg Daily   aspirin 81 mg Daily   carbamazepine 400 mg Q8H   cefTRIAXone (ROCEPHIN) IVPB 1 g Q24H   famotidine 20 mg Daily   heparin (porcine) 5,000 Units Q8H   hydrALAZINE 50 mg Q8H   insulin detemir 10 Units QHS   labetalol 300 mg TID   levetiracetam IVPB 1,000 mg Q12H   lisinopril 10 mg Daily   senna-docusate 8.6-50 mg 1 tablet BID   sodium chloride 0.9% 3 mL Q8H   PRN  dextrose 50% 12.5 g PRN   glucagon (human recombinant) 1 mg PRN   insulin aspart 1-10 Units Q6H PRN   magnesium oxide 800 mg PRN   magnesium oxide 800 mg PRN   potassium chloride 10% 40 mEq PRN   potassium chloride 10% 40 mEq PRN   potassium chloride 10% 60 mEq PRN   potassium, sodium phosphates 2 packet PRN   potassium, sodium phosphates 2 packet PRN   potassium, sodium phosphates 2 packet PRN      Today I independently reviewed pertinent medications, lines/drains/airways, lab results, notably:      HGB 6.7, Na 146, BUN/Cr 37/11.6    Assessment/Plan:  Neuro   * Partial symptomatic epilepsy with complex partial seizures, not intractable, without status epilepticus    -cEEG  -levetiracetam increase  -carbamazepine        Cardiac   Essential hypertension    -hydralazine increase  -amlodipine, labetalol, lisinopril          Renal   Type 2 diabetes mellitus with diabetic chronic kidney disease    -monitor I?O  -detemir, ISS        Other   Anemia of chronic disease    -1 U PRBC, HGB >7        [facility] coma scale total score 9-12    -postictal >ictal          -TF    Prophylaxis:  Venous Thromboembolism: mechanical chemical  Stress Ulcer: H2B  Ventilator Pneumonia: not applicable     Activity Orders          Activity as tolerated starting at 06/07 0065        Full Code

## 2017-06-10 NOTE — PROGRESS NOTES
[facility]  Vascular Neurology  Comprehensive Stroke Center  Progress Note    Neurologic Chief Complaint: aphasia    Subjective:     Interval History: Patient is seen for follow-up neurological assessment and treatment recommendations: transfused 1 unit prbc, still on continuous EEG.   NCC adjusting AEDs  Patient more alert on afternoon rounds, did wiggle toes to command      HPI, Past Medical, Family, and Social History remains the same as documented in the initial encounter.     Review of Systems   Constitutional: Negative for chills and fever.   Gastrointestinal: Negative for vomiting.   Neurological: Positive for seizures, speech difficulty and weakness. Negative for numbness.   Psychiatric/Behavioral: Negative for agitation.     Scheduled Meds:   amlodipine  10 mg Per G Tube Daily    aspirin  81 mg Per G Tube Daily    carbamazepine  400 mg Per G Tube Q8H    cefTRIAXone (ROCEPHIN) IVPB  1 g Intravenous Q24H    famotidine  20 mg Per G Tube Daily    heparin (porcine)  5,000 Units Subcutaneous Q8H    hydrALAZINE  75 mg Per G Tube Q8H    insulin detemir  10 Units Subcutaneous QHS    labetalol  300 mg Per G Tube TID    levetiracetam IVPB  1,500 mg Intravenous Q12H    lisinopril  10 mg Per NG tube Daily    senna-docusate 8.6-50 mg  1 tablet Per G Tube BID    sodium chloride 0.9%  3 mL Intravenous Q8H     Continuous Infusions:     PRN Meds:    Objective:     Vital Signs (Most Recent):  Temp: 98.1 °F (36.7 °C) (06/10/17 1800)  Pulse: 76 (06/10/17 1800)  Resp: (!) 23 (06/10/17 1800)  BP: (!) 110/53 (06/10/17 0400)  SpO2: 97 % (06/10/17 1800)  BP Location: Right arm    Vital Signs Range (Last 24H):  Temp:  [97.7 °F (36.5 °C)-99 °F (37.2 °C)]   Pulse:  [56-80]   Resp:  [7-25]   BP: (110-177)/()   SpO2:  [97 %-100 %]   Arterial Line BP: (115-187)/(42-84)   BP Location: Right arm    Physical Exam   Constitutional: She appears well-developed and well-nourished. No distress.   HENT:    Head: Atraumatic.   Eyes: Pupils are equal, round, and reactive to light.   Cardiovascular: Normal rate.    Pulmonary/Chest: Effort normal. No respiratory distress.   Skin: Skin is warm and dry.       Neurological Exam:   LOC: this am, difficult to arouse from sleep, during pm rounds patient more alert and interactive, follows command to wiggle toes only   Language: attempts to phonate. Family report very little to no verbal output   Memory: Mute   Pupils (CN III, IV, VI): R pupil 2 mm, reactive Yes, brisk. L pupil 2 mm, reactive Yes, brisk.  Facial Movement (CN VII): symmetric facial expression  Motor*: Arm Left:  Paretic:  1/5, Leg Left:   Paretic:  1/5, Arm Right:   Paretic:  2/5, Leg Right:   Paretic:  2/5  Left side with spastic paralysis from old stroke     NIH Stroke Scale:    Level of Consciousness: 0 - alert  LOC Questions: 2 - answers none correctly  LOC Commands: 2 - performs neither correctly  Best Gaze: 0 - normal  Visual: 0 - no visual loss  Facial Palsy: 0 - normal  Motor Left Arm: 3 - no effort against gravity  Motor Right Arm: 3 - no effort against gravity  Motor Left Leg: 3 - no effort against gravity  Motor Right Leg: 3 - no effort against gravity  Limb Ataxia: 0 - absent  Sensory: 0 - normal  Best Language: 2 - severe aphasia  Dysarthria: 2 - near to unintelligible  Extinction and Inattention: 1 - partial neglect  NIH Stroke Scale Total: 21      Laboratory:  CMP:     Recent Labs  Lab 06/10/17  0308   CALCIUM 8.9   ALBUMIN 2.8*   PROT 6.1   *   K 4.5   CO2 23   *   BUN 37*   CREATININE 1.6*   ALKPHOS 104   ALT <5*   AST 14   BILITOT 0.2     CBC:     Recent Labs  Lab 06/10/17  0308   WBC 5.58   RBC 2.24*   HGB 6.7*   HCT 21.7*      MCV 97   MCH 29.9   MCHC 30.9*     Lipid Panel: No results for input(s): CHOL, LDLCALC, HDL, TRIG in the last 168 hours.  Coagulation:     Recent Labs  Lab 06/06/17  2153   INR 0.9   APTT <21.0     Platelet Aggregation Study: No results for input(s):  PLTAGG, PLTAGINTERP, PLTAGREGLACO, ADPPLTAGGREG in the last 168 hours.  Hgb A1C:     Recent Labs  Lab 06/07/17  0113   HGBA1C 5.1     TSH:     Recent Labs  Lab 06/06/17  2153   TSH 1.355       Diagnostic Results:  I have personally reviewed:   Findings:     CT Head. Date: 06/06/17  Interval cranioplasty in this patient with a history of right cerebral hemorrhagic infarct. No acute major territorial infarction or hemorrhage.    Left corona radiata lacunar infarct, likely remote although new since the prior exam.    Senescent changes.    Echo 10/2016  Severely enlarged LA   1 - Normal left ventricular systolic function (EF 65-70%).     2 - Left ventricular diastolic dysfunction.     3 - Biatrial enlargement.     4 - Normal right ventricular systolic function .     5 - Trivial to mild tricuspid regurgitation.     6 - Low central venous pressure.     7 - The estimated PA systolic pressure is 35 mmHg.     Assessment/Plan:     Lucy Perez is a 56 y.o. female with a PMHx of HTN, DM II, HLD, sarcodiosis, RA, CKD, and previous stroke (LSW) who presented with aphasia, staring, and clinched mouth. Patient was admitted to [facility] for potential respiratory issues and possible NCSE.    06/07/17 Patient has not had MRI. Treated with keppra and ativan on arrival. Evidence of seizure activity on EEG overnight. Plans for continuous EEG monitoring.    06/08/17 patient neurologically stable, remains nonverbal and not following commands, would recommend repeat head CT if unable to get MRI    6/10/17 - patient more alert during afternoon rounds, family at bedside reporting very little to no verbal output. Hooked up to EEG. Transfused today 1 unit prbc     Aphasia    56 y.o. female with significant past medical history of HTN, DM II on insulin, HLD, sarcoidosis, RA, CKD III, and residual left sided weakness due to previous stroke (2016) presented to hospital complaining of aphasia.  CTH negative for acute stroke.  Symptoms  likely related to seizure, patient will be admitted to [facility] due to concern for airway protection and possible status epilepticus. 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  Plan as above.        Essential hypertension    Per NCC - Will resume home medications: amlodipine, labetalol, and hydralazine  Will hold Lisinopril in setting of DARLING.  Stroke risk factor.  Plan as above.        * Partial symptomatic epilepsy with complex partial seizures, not intractable, without status epilepticus    -seizure activity seen on EEG  -plans for continuous EEG monitoring  -Managed by NCC  - adjusting carbamazepine and keppra             BECKA Cano  Comprehensive Stroke Center  Department of Vascular Neurology   [facility]

## 2017-06-10 NOTE — ASSESSMENT & PLAN NOTE
-seizure activity seen on EEG  -plans for continuous EEG monitoring  -Managed by NCC  - adjusting carbamazepine and keppra

## 2017-06-10 NOTE — ASSESSMENT & PLAN NOTE
56 y.o. female with significant past medical history of HTN, DM II on insulin, HLD, sarcoidosis, RA, CKD III, and residual left sided weakness due to previous stroke (2016) presented to hospital complaining of aphasia.  CTH negative for acute stroke.  Symptoms likely related to seizure, patient will be admitted to [facility] due to concern for airway protection and possible status epilepticus. MRI head without contrast or repeat head CT (if unable to have MRI) to rule out acute stroke    Antiplatelets:  Aspirin: 81 mg oral daily  Atorvastatin- 40 mg oral daily  VTE Prophylaxis: Heparin 5000 units SQ every 8 hours  BP Parameters: SBP <180  Nursing Orders: Neuro checks- q1h

## 2017-06-11 LAB
ALBUMIN SERPL BCP-MCNC: 2.8 G/DL
ALP SERPL-CCNC: 114 U/L
ALT SERPL W/O P-5'-P-CCNC: 6 U/L
ANION GAP SERPL CALC-SCNC: 9 MMOL/L
AST SERPL-CCNC: 16 U/L
BASOPHILS # BLD AUTO: 0.02 K/UL
BASOPHILS NFR BLD: 0.3 %
BILIRUB SERPL-MCNC: 0.3 MG/DL
BUN SERPL-MCNC: 37 MG/DL
CALCIUM SERPL-MCNC: 9 MG/DL
CARBAMAZEPINE SERPL-MCNC: 10.2 UG/ML
CHLORIDE SERPL-SCNC: 114 MMOL/L
CO2 SERPL-SCNC: 23 MMOL/L
CREAT SERPL-MCNC: 1.3 MG/DL
DIFFERENTIAL METHOD: ABNORMAL
EOSINOPHIL # BLD AUTO: 0.2 K/UL
EOSINOPHIL NFR BLD: 3.3 %
ERYTHROCYTE [DISTWIDTH] IN BLOOD BY AUTOMATED COUNT: 16.1 %
EST. GFR  (AFRICAN AMERICAN): 52.3 ML/MIN/1.73 M^2
EST. GFR  (NON AFRICAN AMERICAN): 45.3 ML/MIN/1.73 M^2
GLUCOSE SERPL-MCNC: 90 MG/DL
HCT VFR BLD AUTO: 24.8 %
HGB BLD-MCNC: 7.8 G/DL
LYMPHOCYTES # BLD AUTO: 1.1 K/UL
LYMPHOCYTES NFR BLD: 15.8 %
MAGNESIUM SERPL-MCNC: 1.7 MG/DL
MAGNESIUM SERPL-MCNC: 1.8 MG/DL
MCH RBC QN AUTO: 29.1 PG
MCHC RBC AUTO-ENTMCNC: 31.5 %
MCV RBC AUTO: 93 FL
MONOCYTES # BLD AUTO: 0.4 K/UL
MONOCYTES NFR BLD: 6 %
NEUTROPHILS # BLD AUTO: 5.3 K/UL
NEUTROPHILS NFR BLD: 74 %
PHOSPHATE SERPL-MCNC: 3.1 MG/DL
PLATELET # BLD AUTO: 196 K/UL
PMV BLD AUTO: 10 FL
POCT GLUCOSE: 108 MG/DL (ref 70–110)
POCT GLUCOSE: 131 MG/DL (ref 70–110)
POCT GLUCOSE: 138 MG/DL (ref 70–110)
POCT GLUCOSE: 146 MG/DL (ref 70–110)
POCT GLUCOSE: 76 MG/DL (ref 70–110)
POCT GLUCOSE: 92 MG/DL (ref 70–110)
POTASSIUM SERPL-SCNC: 4.6 MMOL/L
PROT SERPL-MCNC: 6.1 G/DL
RBC # BLD AUTO: 2.68 M/UL
SODIUM SERPL-SCNC: 146 MMOL/L
WBC # BLD AUTO: 7.17 K/UL

## 2017-06-11 PROCEDURE — 80053 COMPREHEN METABOLIC PANEL: CPT

## 2017-06-11 PROCEDURE — 80156 ASSAY CARBAMAZEPINE TOTAL: CPT

## 2017-06-11 PROCEDURE — 25000003 PHARM REV CODE 250: Performed by: PSYCHIATRY & NEUROLOGY

## 2017-06-11 PROCEDURE — 99233 SBSQ HOSP IP/OBS HIGH 50: CPT | Mod: ,,, | Performed by: PSYCHIATRY & NEUROLOGY

## 2017-06-11 PROCEDURE — 63600175 PHARM REV CODE 636 W HCPCS: Performed by: NURSE PRACTITIONER

## 2017-06-11 PROCEDURE — 27000221 HC OXYGEN, UP TO 24 HOURS

## 2017-06-11 PROCEDURE — 95813 EEG EXTND MNTR 61-119 MIN: CPT | Mod: 26,,, | Performed by: PSYCHIATRY & NEUROLOGY

## 2017-06-11 PROCEDURE — 84100 ASSAY OF PHOSPHORUS: CPT

## 2017-06-11 PROCEDURE — 36415 COLL VENOUS BLD VENIPUNCTURE: CPT

## 2017-06-11 PROCEDURE — 83735 ASSAY OF MAGNESIUM: CPT

## 2017-06-11 PROCEDURE — 85025 COMPLETE CBC W/AUTO DIFF WBC: CPT

## 2017-06-11 PROCEDURE — 83735 ASSAY OF MAGNESIUM: CPT | Mod: 91

## 2017-06-11 PROCEDURE — 25000003 PHARM REV CODE 250: Performed by: STUDENT IN AN ORGANIZED HEALTH CARE EDUCATION/TRAINING PROGRAM

## 2017-06-11 PROCEDURE — 25000003 PHARM REV CODE 250: Performed by: PHYSICIAN ASSISTANT

## 2017-06-11 PROCEDURE — 63600175 PHARM REV CODE 636 W HCPCS: Performed by: PSYCHIATRY & NEUROLOGY

## 2017-06-11 PROCEDURE — 20000000 HC ICU ROOM

## 2017-06-11 PROCEDURE — 25000003 PHARM REV CODE 250: Performed by: NURSE PRACTITIONER

## 2017-06-11 RX ORDER — HYDRALAZINE HYDROCHLORIDE 20 MG/ML
10 INJECTION INTRAMUSCULAR; INTRAVENOUS EVERY 4 HOURS PRN
Status: DISCONTINUED | OUTPATIENT
Start: 2017-06-11 | End: 2017-06-12

## 2017-06-11 RX ORDER — HYDRALAZINE HYDROCHLORIDE 50 MG/1
100 TABLET, FILM COATED ORAL EVERY 8 HOURS
Status: DISCONTINUED | OUTPATIENT
Start: 2017-06-11 | End: 2017-06-14

## 2017-06-11 RX ADMIN — LEVETIRACETAM 1500 MG: 15 INJECTION INTRAVENOUS at 09:06

## 2017-06-11 RX ADMIN — FAMOTIDINE 20 MG: 20 TABLET, FILM COATED ORAL at 06:06

## 2017-06-11 RX ADMIN — CARBAMAZEPINE 400 MG: 100 TABLET, CHEWABLE ORAL at 01:06

## 2017-06-11 RX ADMIN — MAGNESIUM OXIDE TAB 400 MG (241.3 MG ELEMENTAL MG) 800 MG: 400 (241.3 MG) TAB at 09:06

## 2017-06-11 RX ADMIN — CARBAMAZEPINE 400 MG: 100 TABLET, CHEWABLE ORAL at 09:06

## 2017-06-11 RX ADMIN — LABETALOL HCL 300 MG: 100 TABLET, FILM COATED ORAL at 06:06

## 2017-06-11 RX ADMIN — STANDARDIZED SENNA CONCENTRATE AND DOCUSATE SODIUM 1 TABLET: 8.6; 5 TABLET, FILM COATED ORAL at 09:06

## 2017-06-11 RX ADMIN — MAGNESIUM OXIDE TAB 400 MG (241.3 MG ELEMENTAL MG) 800 MG: 400 (241.3 MG) TAB at 05:06

## 2017-06-11 RX ADMIN — INSULIN DETEMIR 10 UNITS: 100 INJECTION, SOLUTION SUBCUTANEOUS at 09:06

## 2017-06-11 RX ADMIN — Medication 3 ML: at 01:06

## 2017-06-11 RX ADMIN — LISINOPRIL 10 MG: 10 TABLET ORAL at 09:06

## 2017-06-11 RX ADMIN — LABETALOL HCL 300 MG: 100 TABLET, FILM COATED ORAL at 09:06

## 2017-06-11 RX ADMIN — AMLODIPINE BESYLATE 10 MG: 10 TABLET ORAL at 09:06

## 2017-06-11 RX ADMIN — HEPARIN SODIUM 5000 UNITS: 5000 INJECTION, SOLUTION INTRAVENOUS; SUBCUTANEOUS at 06:06

## 2017-06-11 RX ADMIN — CARBAMAZEPINE 400 MG: 100 TABLET, CHEWABLE ORAL at 06:06

## 2017-06-11 RX ADMIN — LABETALOL HCL 300 MG: 100 TABLET, FILM COATED ORAL at 01:06

## 2017-06-11 RX ADMIN — HYDRALAZINE HYDROCHLORIDE 100 MG: 50 TABLET ORAL at 09:06

## 2017-06-11 RX ADMIN — ASPIRIN 81 MG CHEWABLE TABLET 81 MG: 81 TABLET CHEWABLE at 09:06

## 2017-06-11 RX ADMIN — HEPARIN SODIUM 5000 UNITS: 5000 INJECTION, SOLUTION INTRAVENOUS; SUBCUTANEOUS at 09:06

## 2017-06-11 RX ADMIN — Medication 800 MG: at 06:06

## 2017-06-11 RX ADMIN — CEFTRIAXONE 1 G: 1 INJECTION, SOLUTION INTRAVENOUS at 01:06

## 2017-06-11 RX ADMIN — HYDRALAZINE HYDROCHLORIDE 100 MG: 50 TABLET ORAL at 01:06

## 2017-06-11 RX ADMIN — HEPARIN SODIUM 5000 UNITS: 5000 INJECTION, SOLUTION INTRAVENOUS; SUBCUTANEOUS at 01:06

## 2017-06-11 RX ADMIN — HYDRALAZINE HYDROCHLORIDE 75 MG: 50 TABLET ORAL at 06:06

## 2017-06-11 NOTE — PROGRESS NOTES
Progress Note  Neurocritical Care    Admit Date: 6/6/2017  Service Date: 06/11/2017   Length of Stay: 5    Chief Complaint: Partial symptomatic epilepsy with complex partial seizures, not intractable, without status epilepticus    History of Present Illness: 56 y woman who presents with aphasia, unresponsiveness. Past R MCA stroke post hemicraniectomy, possibly complicated by Epilepsy, CHF, DM2, HTN, HLD, sarcoid, RA, CKD.     Hospital Course: 6/7 admission to [facility]  6/8 R PLED-> increased carbamazepine, levetiracetam  6/9 waxing/waning R PLED but no seizure     Interval History: cEEG continued. Mentation improving. Got PRBC. BUN/Cr improving.    Review of Systems: Unable to obtain a complete ROS due to level of consciousness.     Vitals:   Temp: 98.4 °F (36.9 °C) (06/11/17 0756)  Pulse: 67 (06/11/17 0756)  Resp: 19 (06/11/17 0756)  BP: (!) 110/53 (06/10/17 0400)  SpO2: 99 % (06/11/17 0756)    Temp:  [97.9 °F (36.6 °C)-98.6 °F (37 °C)] 98.4 °F (36.9 °C)  Pulse:  [61-79] 67  Resp:  [10-25] 19  SpO2:  [97 %-100 %] 99 %  Arterial Line BP: (121-191)/(53-84) 165/64    06/10 0701 - 06/11 0700  In: 1632.5 [I.V.:120]  Out: 850 [Urine:750; Drains:100]     Examination:   Constitutional: Well-nourished and -developed. No apparent distress.   Eyes: Conjunctiva clear, anicteric. Lids no lesions.  Head/Ears/Nose/Mouth/Throat/Neck: Moist mucous membranes. External ears, nose atraumatic.   Cardiovascular: Regular rhythm. No murmurs. No leg edema.  Respiratory: Comfortable respirations. Clear to auscultation.  Gastrointestinal: No hernia. Soft, nondistended, nontender. + bowel sounds.     Neurologic:  -GCS [id number]  -Alert. Says name, few words. Follow simple commands on R.  -Cranial nerves impaired, particularly pupils reactive, oculocephalics intact, corneals intact, L facial droop, does not stick out tongue.  -Motor to pain 5 in R arm and leg, spastic hemiplegia in L arm and leg.  -Sensation intact to pain in all  extremities.  Unable to test memory, judgment, insight, fund of knowledge, coordination, gait due to level of consciousness.    Medications:   Scheduled  amlodipine 10 mg Daily   aspirin 81 mg Daily   carbamazepine 400 mg Q8H   cefTRIAXone (ROCEPHIN) IVPB 1 g Q24H   famotidine 20 mg Daily   heparin (porcine) 5,000 Units Q8H   hydrALAZINE 75 mg Q8H   insulin detemir 10 Units QHS   labetalol 300 mg TID   levetiracetam IVPB 1,500 mg Q12H   lisinopril 10 mg Daily   senna-docusate 8.6-50 mg 1 tablet BID   sodium chloride 0.9% 3 mL Q8H   PRN  sodium chloride  Q24H PRN   dextrose 50% 12.5 g PRN   glucagon (human recombinant) 1 mg PRN   insulin aspart 1-10 Units Q6H PRN   magnesium oxide 800 mg PRN   magnesium oxide 800 mg PRN   potassium chloride 10% 40 mEq PRN   potassium chloride 10% 40 mEq PRN   potassium chloride 10% 60 mEq PRN   potassium, sodium phosphates 2 packet PRN   potassium, sodium phosphates 2 packet PRN   potassium, sodium phosphates 2 packet PRN      Today I independently reviewed pertinent medications, lines/drains/airways, lab results, notably:      Na 146, HGB 7.8, BUN/Cr 37/1.3    Assessment/Plan:  Neuro   * Partial symptomatic epilepsy with complex partial seizures, not intractable, without status epilepticus    -condition is improving  -remove cEEG  -levetiracetam  -carbamazepine        Cardiac   Essential hypertension    -hydralazine increase  -amlodipine, labetalol, lisinopril        Renal   Type 2 diabetes mellitus with diabetic chronic kidney disease    -monitor I/O  -detemir, ISS        Other   Anemia of chronic disease    -HGB >7        Raul coma scale total score 9-12    -postictal >ictal  -see epilepsy          Prophylaxis:  Venous Thromboembolism: mechanical chemical  Stress Ulcer: H2B  Ventilator Pneumonia: yes     Activity Orders          Activity as tolerated starting at 06/07 0218        Full Code

## 2017-06-11 NOTE — PLAN OF CARE
Problem: Patient Care Overview  Goal: Plan of Care Review  Outcome: Ongoing (interventions implemented as appropriate)  POC reviewed with pt at 0400. Pts spouse verbalized understanding. Questions and concerns addressed. No acute events overnight. Pt progressing toward goals. Will continue to monitor. See flowsheets for full assessment and VS info

## 2017-06-11 NOTE — PLAN OF CARE
Problem: Patient Care Overview  Goal: Plan of Care Review  No acute events throughout shift. Patient remains largely aphasic but will intermittently state her name and date of birth, as well as respond to yes/no questions. TF per orders with low residuals, no BM yet on shift. Purewick in place with adequate output. Family at bedside, questions and concerns addressed. VS and assessment per flowsheet, VSS, will continue to monitor.

## 2017-06-12 LAB
ALBUMIN SERPL BCP-MCNC: 2.8 G/DL
ALP SERPL-CCNC: 119 U/L
ALT SERPL W/O P-5'-P-CCNC: 8 U/L
ANION GAP SERPL CALC-SCNC: 8 MMOL/L
AST SERPL-CCNC: 17 U/L
BASOPHILS # BLD AUTO: 0.03 K/UL
BASOPHILS NFR BLD: 0.5 %
BILIRUB SERPL-MCNC: 0.2 MG/DL
BUN SERPL-MCNC: 39 MG/DL
CALCIUM SERPL-MCNC: 9.1 MG/DL
CARBAMAZEPINE SERPL-MCNC: 10.3 UG/ML
CHLORIDE SERPL-SCNC: 112 MMOL/L
CO2 SERPL-SCNC: 24 MMOL/L
CREAT SERPL-MCNC: 1.3 MG/DL
DIFFERENTIAL METHOD: ABNORMAL
EOSINOPHIL # BLD AUTO: 0.3 K/UL
EOSINOPHIL NFR BLD: 4.8 %
ERYTHROCYTE [DISTWIDTH] IN BLOOD BY AUTOMATED COUNT: 15.9 %
EST. GFR  (AFRICAN AMERICAN): 52.3 ML/MIN/1.73 M^2
EST. GFR  (NON AFRICAN AMERICAN): 45.3 ML/MIN/1.73 M^2
GLUCOSE SERPL-MCNC: 124 MG/DL
HCT VFR BLD AUTO: 25.2 %
HGB BLD-MCNC: 8 G/DL
LYMPHOCYTES # BLD AUTO: 0.7 K/UL
LYMPHOCYTES NFR BLD: 11.9 %
MAGNESIUM SERPL-MCNC: 2.2 MG/DL
MCH RBC QN AUTO: 29.5 PG
MCHC RBC AUTO-ENTMCNC: 31.7 %
MCV RBC AUTO: 93 FL
MONOCYTES # BLD AUTO: 0.6 K/UL
MONOCYTES NFR BLD: 9.8 %
NEUTROPHILS # BLD AUTO: 4.1 K/UL
NEUTROPHILS NFR BLD: 72.6 %
PHOSPHATE SERPL-MCNC: 2.3 MG/DL
PLATELET # BLD AUTO: 213 K/UL
PMV BLD AUTO: 9.5 FL
POCT GLUCOSE: 123 MG/DL (ref 70–110)
POCT GLUCOSE: 171 MG/DL (ref 70–110)
POTASSIUM SERPL-SCNC: 4.6 MMOL/L
PROT SERPL-MCNC: 6.2 G/DL
RBC # BLD AUTO: 2.71 M/UL
SODIUM SERPL-SCNC: 144 MMOL/L
WBC # BLD AUTO: 5.64 K/UL

## 2017-06-12 PROCEDURE — 83735 ASSAY OF MAGNESIUM: CPT

## 2017-06-12 PROCEDURE — 99233 SBSQ HOSP IP/OBS HIGH 50: CPT | Mod: GC,,, | Performed by: PSYCHIATRY & NEUROLOGY

## 2017-06-12 PROCEDURE — 20000000 HC ICU ROOM

## 2017-06-12 PROCEDURE — 93005 ELECTROCARDIOGRAM TRACING: CPT

## 2017-06-12 PROCEDURE — 93010 ELECTROCARDIOGRAM REPORT: CPT | Mod: S$GLB,,, | Performed by: INTERNAL MEDICINE

## 2017-06-12 PROCEDURE — 63600175 PHARM REV CODE 636 W HCPCS: Performed by: PHYSICIAN ASSISTANT

## 2017-06-12 PROCEDURE — 84100 ASSAY OF PHOSPHORUS: CPT

## 2017-06-12 PROCEDURE — 25000003 PHARM REV CODE 250: Performed by: NURSE PRACTITIONER

## 2017-06-12 PROCEDURE — 25000003 PHARM REV CODE 250: Performed by: PHYSICIAN ASSISTANT

## 2017-06-12 PROCEDURE — 85025 COMPLETE CBC W/AUTO DIFF WBC: CPT

## 2017-06-12 PROCEDURE — 63600175 PHARM REV CODE 636 W HCPCS: Performed by: PSYCHIATRY & NEUROLOGY

## 2017-06-12 PROCEDURE — 99232 SBSQ HOSP IP/OBS MODERATE 35: CPT | Mod: GC,,, | Performed by: PSYCHIATRY & NEUROLOGY

## 2017-06-12 PROCEDURE — 25000242 PHARM REV CODE 250 ALT 637 W/ HCPCS: Performed by: PHYSICIAN ASSISTANT

## 2017-06-12 PROCEDURE — 80053 COMPREHEN METABOLIC PANEL: CPT

## 2017-06-12 PROCEDURE — 25000003 PHARM REV CODE 250: Performed by: PSYCHIATRY & NEUROLOGY

## 2017-06-12 PROCEDURE — 25000003 PHARM REV CODE 250: Performed by: STUDENT IN AN ORGANIZED HEALTH CARE EDUCATION/TRAINING PROGRAM

## 2017-06-12 PROCEDURE — 94640 AIRWAY INHALATION TREATMENT: CPT

## 2017-06-12 PROCEDURE — 80156 ASSAY CARBAMAZEPINE TOTAL: CPT

## 2017-06-12 PROCEDURE — 25000003 PHARM REV CODE 250

## 2017-06-12 RX ORDER — POLYETHYLENE GLYCOL 3350 17 G/17G
17 POWDER, FOR SOLUTION ORAL DAILY
Status: DISCONTINUED | OUTPATIENT
Start: 2017-06-12 | End: 2017-06-12

## 2017-06-12 RX ORDER — HYDRALAZINE HYDROCHLORIDE 20 MG/ML
10 INJECTION INTRAMUSCULAR; INTRAVENOUS EVERY 4 HOURS PRN
Status: DISCONTINUED | OUTPATIENT
Start: 2017-06-12 | End: 2017-06-15 | Stop reason: HOSPADM

## 2017-06-12 RX ORDER — CIPROFLOXACIN 2 MG/ML
400 INJECTION, SOLUTION INTRAVENOUS EVERY 8 HOURS
Status: DISCONTINUED | OUTPATIENT
Start: 2017-06-12 | End: 2017-06-13

## 2017-06-12 RX ORDER — SODIUM CHLORIDE 9 MG/ML
INJECTION, SOLUTION INTRAVENOUS CONTINUOUS
Status: DISCONTINUED | OUTPATIENT
Start: 2017-06-12 | End: 2017-06-21

## 2017-06-12 RX ORDER — SODIUM CHLORIDE 9 MG/ML
INJECTION, SOLUTION INTRAVENOUS CONTINUOUS
Status: DISCONTINUED | OUTPATIENT
Start: 2017-06-12 | End: 2017-06-12

## 2017-06-12 RX ORDER — METOCLOPRAMIDE HYDROCHLORIDE 5 MG/ML
10 INJECTION INTRAMUSCULAR; INTRAVENOUS EVERY 8 HOURS
Status: COMPLETED | OUTPATIENT
Start: 2017-06-12 | End: 2017-06-13

## 2017-06-12 RX ORDER — ALBUTEROL SULFATE 0.83 MG/ML
2.5 SOLUTION RESPIRATORY (INHALATION) EVERY 4 HOURS
Status: DISCONTINUED | OUTPATIENT
Start: 2017-06-12 | End: 2017-06-22

## 2017-06-12 RX ORDER — LISINOPRIL 10 MG/1
10 TABLET ORAL DAILY
Status: DISCONTINUED | OUTPATIENT
Start: 2017-06-13 | End: 2017-06-22 | Stop reason: HOSPADM

## 2017-06-12 RX ORDER — ACETAMINOPHEN 325 MG/1
TABLET ORAL
Status: COMPLETED
Start: 2017-06-12 | End: 2017-06-12

## 2017-06-12 RX ORDER — POLYETHYLENE GLYCOL 3350 17 G/17G
17 POWDER, FOR SOLUTION ORAL DAILY
Status: DISCONTINUED | OUTPATIENT
Start: 2017-06-13 | End: 2017-06-22 | Stop reason: HOSPADM

## 2017-06-12 RX ORDER — LABETALOL HYDROCHLORIDE 5 MG/ML
INJECTION, SOLUTION INTRAVENOUS
Status: COMPLETED
Start: 2017-06-12 | End: 2017-06-12

## 2017-06-12 RX ADMIN — HYDRALAZINE HYDROCHLORIDE 100 MG: 50 TABLET ORAL at 01:06

## 2017-06-12 RX ADMIN — LEVETIRACETAM 1500 MG: 15 INJECTION INTRAVENOUS at 09:06

## 2017-06-12 RX ADMIN — CIPROFLOXACIN 400 MG: 2 INJECTION, SOLUTION INTRAVENOUS at 01:06

## 2017-06-12 RX ADMIN — INSULIN DETEMIR 10 UNITS: 100 INJECTION, SOLUTION SUBCUTANEOUS at 09:06

## 2017-06-12 RX ADMIN — CIPROFLOXACIN 400 MG: 2 INJECTION, SOLUTION INTRAVENOUS at 09:06

## 2017-06-12 RX ADMIN — STANDARDIZED SENNA CONCENTRATE AND DOCUSATE SODIUM 1 TABLET: 8.6; 5 TABLET, FILM COATED ORAL at 08:06

## 2017-06-12 RX ADMIN — SODIUM CHLORIDE: 0.9 INJECTION, SOLUTION INTRAVENOUS at 09:06

## 2017-06-12 RX ADMIN — LEVETIRACETAM 1500 MG: 15 INJECTION INTRAVENOUS at 08:06

## 2017-06-12 RX ADMIN — AMLODIPINE BESYLATE 10 MG: 10 TABLET ORAL at 08:06

## 2017-06-12 RX ADMIN — HYDRALAZINE HYDROCHLORIDE 100 MG: 50 TABLET ORAL at 09:06

## 2017-06-12 RX ADMIN — HEPARIN SODIUM 5000 UNITS: 5000 INJECTION, SOLUTION INTRAVENOUS; SUBCUTANEOUS at 01:06

## 2017-06-12 RX ADMIN — HYDRALAZINE HYDROCHLORIDE 100 MG: 50 TABLET ORAL at 05:06

## 2017-06-12 RX ADMIN — FAMOTIDINE 20 MG: 20 TABLET, FILM COATED ORAL at 05:06

## 2017-06-12 RX ADMIN — ASPIRIN 81 MG CHEWABLE TABLET 81 MG: 81 TABLET CHEWABLE at 08:06

## 2017-06-12 RX ADMIN — CARBAMAZEPINE 400 MG: 100 TABLET, CHEWABLE ORAL at 01:06

## 2017-06-12 RX ADMIN — INSULIN ASPART 1 UNITS: 100 INJECTION, SOLUTION INTRAVENOUS; SUBCUTANEOUS at 12:06

## 2017-06-12 RX ADMIN — METOCLOPRAMIDE 10 MG: 5 INJECTION, SOLUTION INTRAMUSCULAR; INTRAVENOUS at 09:06

## 2017-06-12 RX ADMIN — LABETALOL HYDROCHLORIDE 10 MG: 5 INJECTION, SOLUTION INTRAVENOUS at 06:06

## 2017-06-12 RX ADMIN — CARBAMAZEPINE 400 MG: 100 TABLET, CHEWABLE ORAL at 05:06

## 2017-06-12 RX ADMIN — POTASSIUM & SODIUM PHOSPHATES POWDER PACK 280-160-250 MG 2 PACKET: 280-160-250 PACK at 05:06

## 2017-06-12 RX ADMIN — ALBUTEROL SULFATE 2.5 MG: 2.5 SOLUTION RESPIRATORY (INHALATION) at 11:06

## 2017-06-12 RX ADMIN — LABETALOL HCL 500 MG: 200 TABLET, FILM COATED ORAL at 09:06

## 2017-06-12 RX ADMIN — POTASSIUM & SODIUM PHOSPHATES POWDER PACK 280-160-250 MG 2 PACKET: 280-160-250 PACK at 08:06

## 2017-06-12 RX ADMIN — SODIUM CHLORIDE: 0.9 INJECTION, SOLUTION INTRAVENOUS at 01:06

## 2017-06-12 RX ADMIN — HEPARIN SODIUM 5000 UNITS: 5000 INJECTION, SOLUTION INTRAVENOUS; SUBCUTANEOUS at 05:06

## 2017-06-12 RX ADMIN — LABETALOL HCL 300 MG: 100 TABLET, FILM COATED ORAL at 05:06

## 2017-06-12 RX ADMIN — HEPARIN SODIUM 5000 UNITS: 5000 INJECTION, SOLUTION INTRAVENOUS; SUBCUTANEOUS at 09:06

## 2017-06-12 RX ADMIN — Medication 3 ML: at 09:06

## 2017-06-12 RX ADMIN — POLYETHYLENE GLYCOL 3350 17 G: 17 POWDER, FOR SOLUTION ORAL at 01:06

## 2017-06-12 RX ADMIN — LISINOPRIL 10 MG: 10 TABLET ORAL at 08:06

## 2017-06-12 RX ADMIN — STANDARDIZED SENNA CONCENTRATE AND DOCUSATE SODIUM 1 TABLET: 8.6; 5 TABLET, FILM COATED ORAL at 09:06

## 2017-06-12 RX ADMIN — CARBAMAZEPINE 400 MG: 100 TABLET, CHEWABLE ORAL at 09:06

## 2017-06-12 RX ADMIN — HYDRALAZINE HYDROCHLORIDE 10 MG: 20 INJECTION INTRAMUSCULAR; INTRAVENOUS at 03:06

## 2017-06-12 RX ADMIN — LABETALOL HCL 500 MG: 200 TABLET, FILM COATED ORAL at 01:06

## 2017-06-12 NOTE — PLAN OF CARE
Problem: Patient Care Overview  Goal: Plan of Care Review  Outcome: Revised  POC reviewed with pt and daughter at 0500. Pt is aphasic and unable verbalize understanding. Questions and concerns addressed per daughter. No acute events overnight. Pt had one episode of emesis overnight, tube feeds held per MD, pure wick changed per policy, and phosphorus replaced per orders.  Pt progressing toward goals. Will continue to monitor. See flowsheets for full assessment and VS info

## 2017-06-12 NOTE — PLAN OF CARE
Problem: Patient Care Overview  Goal: Plan of Care Review  Outcome: Ongoing (interventions implemented as appropriate)  No acute events throughout the day, VS and assessment per flow sheet, patient progressing towards goal as tolerated. Plan of care reviewed with Lucy Perez and family, all concerns addressed. Will continue to monitor.     Pt verbalizing and following commands intermittently. TF held d/t emesis this am prior to shift, restarted trickle feeds at 1100. At 1445 pt had episode of emesis while in transport to MRI, MRI held, NCC notified, will reattempt at 1700

## 2017-06-12 NOTE — PROCEDURES
DATE OF SERVICE:  06/10/2017    ICU EEG/VIDEO MONITORING REPORT    METHODOLOGY:  Electroencephalographic (EEG) is recorded with electrodes placed   according to the International 10-20 placement system.  Thirty two (32) channels   of digital signal (sampling rate of 512/sec), including T1 and T2, were   simultaneously recorded from the scalp and may include EKG, EMG, and/or eye   monitors.  Recording band pass was 0.1 to 512 Hz.  Digital video recording of   the patient is simultaneously recorded with the EEG.  The patient is instructed   to report clinical symptoms which may occur during the recording session.  EEG   and video recording are stored and archived in digital format.  Activation   procedures, which include photic stimulation, hyperventilation and instructing   patients to perform simple tasks, are done in selected patients.    The EEG is displayed on a monitor screen and can be reviewed using different   montages.  Computer-assisted analysis is employed to detect spike and   electrographic seizure activity.  The entire record is submitted for computer   analysis.  The entire recording is visually reviewed, and the times identified   by computer analysis as being spikes or seizures are reviewed again.    Compressed spectral analysis (CSA) is also performed on the activity recorded   from each individual channel.  This is displayed as a power display of   frequencies from 0 to 30 Hz over time.  The CSA is reviewed looking for   asymmetries in power between homologous areas of the scalp, then compared with   the original EEG recording.    [id number] software was also utilized in the review of this study.  This software   suite analyzes the EEG recording in multiple domains.  Coherence and rhythmicity   are computed to identify EEG sections which may contain organized seizures.    Each channel undergoes analysis to detect the presence of spike and sharp waves   which have special and morphological  characteristics of epileptic activity.  The   routine EEG recording is converted from special into frequency domain.  This is   then displayed comparing homologous areas to identify areas of significant   asymmetry.  Algorithm to identify non-cortically generated artifact is used to   separate artifact from the EEG.    RECORDING TIMES:  Duration of this study is 28 hours and 15 minutes.  The study   begins 06/10/2017 at 07:00 and ends 06/11/2017 at 12:50.    EEG FINDINGS:  The background of this study begins as in previous days with a   mixed frequency background consisting primarily of delta and theta activity and   epileptiform discharges seen in the right hemisphere in the form of PLEDs   ranging from one per second to one per 3 to 5 seconds.  There also are pages in   which the PLEDs disappear completely.  Rhythmic runs are not seen.  Epileptiform   activity in the form of seizures is not seen.  Overall, record looks similar to   the prior 24 hours, which was an improvement from previous days in that PLEDs   have become less frequent in general.  The overall background contains more   faster activity than was seen in prior days as well with significant amounts of   theta as well as alpha and beta activity and less delta.  As this study   progresses, less of the right hemispheric PLEDs are seen and they are generally   seen more sporadically than periodically in the final portions of the recording   indicating some lessening of cortical irritability and some improvement.    INTERPRETATION:  Abnormal EEG due to fluctuating, but slightly improving   moderate encephalopathy as described above and due to right hemispheric cortical   irritability, which is also slightly improved over the past 48 hours.  Active   seizure activity is no longer seen.      LUÍS/ANDRAE  dd: 06/11/2017 12:16:32 (CDT)  td: 06/11/2017 21:19:53 (CDT)  Doc ID   #[id number]  Job ID #[id number]    CC:

## 2017-06-12 NOTE — SUBJECTIVE & OBJECTIVE
Neurologic Chief Complaint: aphasia    Subjective:     Interval History: Patient is seen for follow-up neurological assessment and treatment recommendations: ELAINE. Patient is alert and following commands      HPI, Past Medical, Family, and Social History remains the same as documented in the initial encounter.     Review of Systems   Constitutional: Negative for chills and fever.   Respiratory: Negative for cough and shortness of breath.    Gastrointestinal: Negative for vomiting.   Neurological: Positive for seizures, speech difficulty and weakness. Negative for numbness.   Psychiatric/Behavioral: Negative for agitation.     Scheduled Meds:   amlodipine  10 mg Per G Tube Daily    aspirin  81 mg Per G Tube Daily    carbamazepine  400 mg Per G Tube Q8H    ciprofloxacin  400 mg Intravenous Q8H    famotidine  20 mg Per G Tube Daily    heparin (porcine)  5,000 Units Subcutaneous Q8H    hydrALAZINE  100 mg Per G Tube Q8H    insulin detemir  10 Units Subcutaneous QHS    labetalol  500 mg Per G Tube TID    levetiracetam IVPB  1,500 mg Intravenous Q12H    lisinopril  10 mg Per NG tube Daily    polyethylene glycol  17 g Oral Daily    senna-docusate 8.6-50 mg  1 tablet Per G Tube BID    sodium chloride 0.9%  3 mL Intravenous Q8H     Continuous Infusions:   sodium chloride 0.9% 50 mL/hr at 06/12/17 1328     PRN Meds:    Objective:     Vital Signs (Most Recent):  Temp: 98.2 °F (36.8 °C) (06/12/17 1000)  Pulse: 70 (06/12/17 1000)  Resp: 14 (06/12/17 1000)  BP: (!) 169/67 (06/12/17 0300)  SpO2: 99 % (06/12/17 1000)  BP Location: Left arm    Vital Signs Range (Last 24H):  Temp:  [97.3 °F (36.3 °C)-98.8 °F (37.1 °C)]   Pulse:  [59-82]   Resp:  [13-28]   BP: (150-182)/(55-70)   SpO2:  [93 %-99 %]   Arterial Line BP: (126-203)/(48-85)   BP Location: Left arm    Physical Exam   Constitutional: She appears well-developed and well-nourished. No distress.   HENT:   Head: Atraumatic.   Eyes: Pupils are equal, round, and  reactive to light.   Cardiovascular: Normal rate.    Pulmonary/Chest: Effort normal. No respiratory distress.   Neurological: She is alert.   Skin: Skin is warm and dry.       Neurological Exam:   LOC: alert and following commands, oriented to person and place   Language: some vocalization  Pupils (CN III, IV, VI): PERRL.  Facial Movement (CN VII): symmetric facial expression  Motor*: Arm Left:  Paretic:  1/5, Leg Left:   Paretic:  1/5, Arm Right:   Paretic:  3/5, Leg Right:   Paretic:  3/5  Left side with spastic paralysis from old stroke     NIH Stroke Scale:    Level of Consciousness: 0 - alert  LOC Questions: 2 - answers none correctly  LOC Commands: 0 - performs both correctly  Best Gaze: 0 - normal  Visual: 0 - no visual loss  Facial Palsy: 0 - normal  Motor Left Arm: 3 - no effort against gravity  Motor Right Arm: 2 - can't resist gravity  Motor Left Leg: 3 - no effort against gravity  Motor Right Le - can't resist gravity  Limb Ataxia: 0 - absent  Sensory: 0 - normal  Best Language: 1 - mild to moderate aphasia  Dysarthria: 2 - near to unintelligible  Extinction and Inattention: 1 - partial neglect  NIH Stroke Scale Total: 16      Laboratory:  CMP:     Recent Labs  Lab 17  0321   CALCIUM 9.1   ALBUMIN 2.8*   PROT 6.2      K 4.6   CO2 24   *   BUN 39*   CREATININE 1.3   ALKPHOS 119   ALT 8*   AST 17   BILITOT 0.2     CBC:     Recent Labs  Lab 17  0321   WBC 5.64   RBC 2.71*   HGB 8.0*   HCT 25.2*      MCV 93   MCH 29.5   MCHC 31.7*     Lipid Panel: No results for input(s): CHOL, LDLCALC, HDL, TRIG in the last 168 hours.  Coagulation:     Recent Labs  Lab 17   INR 0.9   APTT <21.0     Platelet Aggregation Study: No results for input(s): PLTAGG, PLTAGINTERP, PLTAGREGLACO, ADPPLTAGGREG in the last 168 hours.  Hgb A1C:     Recent Labs  Lab 17  0113   HGBA1C 5.1     TSH:     Recent Labs  Lab 17   TSH 1.355       Diagnostic Results:  I have personally  reviewed:   Findings:     CT Head. Date: 06/06/17  Interval cranioplasty in this patient with a history of right cerebral hemorrhagic infarct. No acute major territorial infarction or hemorrhage.    Left corona radiata lacunar infarct, likely remote although new since the prior exam.    Senescent changes.    Echo 10/2016  Severely enlarged LA   1 - Normal left ventricular systolic function (EF 65-70%).     2 - Left ventricular diastolic dysfunction.     3 - Biatrial enlargement.     4 - Normal right ventricular systolic function .     5 - Trivial to mild tricuspid regurgitation.     6 - Low central venous pressure.     7 - The estimated PA systolic pressure is 35 mmHg.

## 2017-06-12 NOTE — PROCEDURES
DATE OF SERVICE:  06/08/2017    ICU EEG/VIDEO MONITORING REPORT    METHODOLOGY:  Electroencephalographic (EEG) is recorded with electrodes placed   according to the International 10-20 placement system.  Thirty two (32) channels   of digital signal (sampling rate of 512/sec), including T1 and T2, were   simultaneously recorded from the scalp and may include EKG, EMG, and/or eye   monitors.  Recording band pass was 0.1 to 512 Hz.  Digital video recording of   the patient is simultaneously recorded with the EEG.  The patient is instructed   to report clinical symptoms which may occur during the recording session.  EEG   and video recording are stored and archived in digital format.  Activation   procedures, which include photic stimulation, hyperventilation and instructing   patients to perform simple tasks, are done in selected patients.    The EEG is displayed on a monitor screen and can be reviewed using different   montages.  Computer-assisted analysis is employed to detect spike and   electrographic seizure activity.  The entire record is submitted for computer   analysis.  The entire recording is visually reviewed, and the times identified   by computer analysis as being spikes or seizures are reviewed again.    Compressed spectral analysis (CSA) is also performed on the activity recorded   from each individual channel.  This is displayed as a power display of   frequencies from 0 to 30 Hz over time.  The CSA is reviewed looking for   asymmetries in power between homologous areas of the scalp, then compared with   the original EEG recording.    ChangeTip software was also utilized in the review of this study.  This software   suite analyzes the EEG recording in multiple domains.  Coherence and rhythmicity   are computed to identify EEG sections which may contain organized seizures.    Each channel undergoes analysis to detect the presence of spike and sharp waves   which have special and morphological  characteristics of epileptic activity.  The   routine EEG recording is converted from special into frequency domain.  This is   then displayed comparing homologous areas to identify areas of significant   asymmetry.  Algorithm to identify non-cortically generated artifact is used to   separate artifact from the EEG.    RECORDING TIMES:  Duration of this study is 23 hours and 58 minutes.  It begins   June 8th 07:00, ends June 9th 07:00.    EEG FINDINGS:  The background of this study reveals intermittent epileptiform   discharges seen frequently, especially in the right hemisphere in the form of   PLEDs occurring anywhere from one every second to one every 4 to 5 seconds.    Very rarely, there are brief rhythmic runs lasting up to 2 to 3 seconds, but   these do not become sustained.  The left hemisphere is dominated by   low-to-medium amplitude EMG artifact.  In times at which the artifact is less   present, the background appears similar to the left hemisphere except without   the lateralized epileptiform discharges the majority of the time.  Occasionally,   these discharges do appear bilaterally synchronously, but are predominantly   right hemispheric.  Isolated left hemispheric discharges are not appreciated.    During portions in which the patient appears to be more quiescent and artifact   diminishes, periodic discharges become less frequent and less sharp other than   fluctuating between an awake higher amplitude, higher artifact state and a   quiescent lower amplitude, less artifact, less sharp state, the record changes   little other than moment to moment fluctuation.  There is no major trend from   the beginning to end and the epileptiform discharges do remain, although they do   not become continuous and do not coalesce into ictal seizure activity.    INTERPRETATION:  Abnormal EEG due to moderate disorganization and slowing of the   background rhythm with frequent right hemispheric epileptiform discharges    sometimes taking the form of PLEDs seen throughout the record.  Overt seizure   activity was not appreciated.      LUÍS/ANDRAE  dd: 06/09/2017 15:59:51 (CDT)  td: 06/10/2017 03:18:12 (CDT)  Doc ID   #[id number]  Job ID #[id number]    CC:

## 2017-06-12 NOTE — MEDICAL/APP STUDENT
[facility]  Adult Nutrition  Consult Note    SUMMARY     Recommendations    1. Current TF meeting <85% EEN. When able to resume feeds, recommend increasing Diabetisource to 55ml/hr.     Provides 1584 kcals, 79g protein, 1080 ml fluid. Hold for residuals >500 ml.   RD following.   Goals: Meet >75% EEN  Nutrition Goal Status: goal met  Communication of RD Recs: reviewed with RN       Reason for Assessment    Reason for Assessment: RD follow-up  Diagnosis: other (see comments) (Seizure disorder)  Relevent Medical History: DM, HTN, HLD, PEG placed   Interdisciplinary Rounds: did not attend     General Information Comments: Pt nonverbal. PEG present. TF currently held per MD due to emesis overnight. TF was held on 6/10 for vomiting as well.     Nutrition Discharge Planning: Tolerance of TF.    Nutrition Prescription Ordered    Current Diet Order: NPO     Current Nutrition Support Formula Ordered: Diabetisource  Current Nutrition Support Rate Ordered: 50 (ml)  Current Nutrition Support Frequency Ordered: ml/hr        Evaluation of Received Nutrients/Fluid Intake    Enteral Calories (kcal): 1440  Enteral Protein (gm): 72  Enteral (Free Water) Fluid (mL): 982      Energy Calories Required: not meeting needs  % Kcal Needs: 82      Protein Required: meeting needs  % Protein Needs: 87     IV Fluid (mL): 250     Fluid Required:  (per MD)  Comments: I/O noted. +5.7L since admit. Good UOP.      Nutrition Risk Screen     Nutrition Risk Screen: tube feeding or parenteral nutrition    Nutrition/Diet History    Patient Reported Diet/Restrictions/Preferences: other (see comments) (OSMAR)   Factors Affecting Nutritional Intake: NPO, nausea/vomiting, difficulty/impaired swallowing     Labs/Tests/Procedures/Meds       Pertinent Labs Reviewed: reviewed, pertinent  Pertinent Labs Comments: BUN 39, Blue , Phos 2.3, Alb 2.8, Chol 204, Trig 154, A1C 5.1  Pertinent Medications Reviewed: reviewed, pertinent  Pertinent  "Medications Comments: Famotidine, Insulin, Senna-doc    Physical Findings    Overall Physical Appearance: overweight  Tubes: gastrostomy tube     Skin: intact    Anthropometrics    Temp: 98.2 °F (36.8 °C)     Height: 5' 4" (162.6 cm)  Weight Method: Stated  Weight: 102.1 kg (225 lb 1.4 oz)     Ideal Body Weight (IBW), Female: 120 lb     % Ideal Body Weight, Female (lb): 187.58 lb  BMI (Calculated): 38.7  BMI Grade: 35 - 39.9 - obesity - grade II        Estimated/Assessed Needs    Weight Used For Calorie Calculations: 102.1 kg (225 lb 1.4 oz)         Energy Need Method: Lee-St Jeor, other (see comments) (7449-4895 kcal/d)       RMR (Lee-St. Jeor Equation): 1586        Weight Used For Protein Calculations: 102.1 kg (225 lb 1.4 oz)  Protein Requirements:  g/d    Fluid Need Method: RDA Method, other (see comments) (Per MD or 1 mL/kcal)     CHO Requirement: 50% total kcals     Assessment and Plan    Nutr Dx: Inadequate energy intake r/t inability to consume sufficient energy AEB NPO with no alternate means of nutrition.  Status: Continues     Monitor and Evaluation    Food and Nutrient Intake: enteral nutrition intake  Food and Nutrient Adminstration: enteral and parenteral nutrition administration     Physical Activity and Function: nutrition-related ADLs and IADLs  Anthropometric Measurements: weight, weight change, body mass index  Biochemical Data, Medical Tests and Procedures: electrolyte and renal panel, gastrointestinal profile, glucose/endocrine profile, inflammatory profile, lipid profile  Nutrition-Focused Physical Findings: overall appearance    Nutrition Risk    Level of Risk: other (see comments) (2x/week)    Nutrition Follow-Up    RD Follow-up?: Yes      "

## 2017-06-12 NOTE — PROGRESS NOTES
[facility]  Vascular Neurology  Comprehensive Stroke Center  Progress Note    Neurologic Chief Complaint: aphasia    Subjective:     Interval History: Patient is seen for follow-up neurological assessment and treatment recommendations: ELAINE. Patient is alert and following commands      HPI, Past Medical, Family, and Social History remains the same as documented in the initial encounter.     Review of Systems   Constitutional: Negative for chills and fever.   Respiratory: Negative for cough and shortness of breath.    Gastrointestinal: Negative for vomiting.   Neurological: Positive for seizures, speech difficulty and weakness. Negative for numbness.   Psychiatric/Behavioral: Negative for agitation.     Scheduled Meds:   amlodipine  10 mg Per G Tube Daily    aspirin  81 mg Per G Tube Daily    carbamazepine  400 mg Per G Tube Q8H    ciprofloxacin  400 mg Intravenous Q8H    famotidine  20 mg Per G Tube Daily    heparin (porcine)  5,000 Units Subcutaneous Q8H    hydrALAZINE  100 mg Per G Tube Q8H    insulin detemir  10 Units Subcutaneous QHS    labetalol  500 mg Per G Tube TID    levetiracetam IVPB  1,500 mg Intravenous Q12H    lisinopril  10 mg Per NG tube Daily    polyethylene glycol  17 g Oral Daily    senna-docusate 8.6-50 mg  1 tablet Per G Tube BID    sodium chloride 0.9%  3 mL Intravenous Q8H     Continuous Infusions:   sodium chloride 0.9% 50 mL/hr at 06/12/17 1328     PRN Meds:    Objective:     Vital Signs (Most Recent):  Temp: 98.2 °F (36.8 °C) (06/12/17 1000)  Pulse: 70 (06/12/17 1000)  Resp: 14 (06/12/17 1000)  BP: (!) 169/67 (06/12/17 0300)  SpO2: 99 % (06/12/17 1000)  BP Location: Left arm    Vital Signs Range (Last 24H):  Temp:  [97.3 °F (36.3 °C)-98.8 °F (37.1 °C)]   Pulse:  [59-82]   Resp:  [13-28]   BP: (150-182)/(55-70)   SpO2:  [93 %-99 %]   Arterial Line BP: (126-203)/(48-85)   BP Location: Left arm    Physical Exam   Constitutional: She appears well-developed  and well-nourished. No distress.   HENT:   Head: Atraumatic.   Eyes: Pupils are equal, round, and reactive to light.   Cardiovascular: Normal rate.    Pulmonary/Chest: Effort normal. No respiratory distress.   Neurological: She is alert.   Skin: Skin is warm and dry.       Neurological Exam:   LOC: alert and following commands, oriented to person and place   Language: some vocalization  Pupils (CN III, IV, VI): PERRL.  Facial Movement (CN VII): symmetric facial expression  Motor*: Arm Left:  Paretic:  1/5, Leg Left:   Paretic:  1/5, Arm Right:   Paretic:  3/5, Leg Right:   Paretic:  3/5  Left side with spastic paralysis from old stroke     NIH Stroke Scale:    Level of Consciousness: 0 - alert  LOC Questions: 2 - answers none correctly  LOC Commands: 0 - performs both correctly  Best Gaze: 0 - normal  Visual: 0 - no visual loss  Facial Palsy: 0 - normal  Motor Left Arm: 3 - no effort against gravity  Motor Right Arm: 2 - can't resist gravity  Motor Left Leg: 3 - no effort against gravity  Motor Right Le - can't resist gravity  Limb Ataxia: 0 - absent  Sensory: 0 - normal  Best Language: 1 - mild to moderate aphasia  Dysarthria: 2 - near to unintelligible  Extinction and Inattention: 1 - partial neglect  NIH Stroke Scale Total: 16      Laboratory:  CMP:     Recent Labs  Lab 17  0321   CALCIUM 9.1   ALBUMIN 2.8*   PROT 6.2      K 4.6   CO2 24   *   BUN 39*   CREATININE 1.3   ALKPHOS 119   ALT 8*   AST 17   BILITOT 0.2     CBC:     Recent Labs  Lab 17  0321   WBC 5.64   RBC 2.71*   HGB 8.0*   HCT 25.2*      MCV 93   MCH 29.5   MCHC 31.7*     Lipid Panel: No results for input(s): CHOL, LDLCALC, HDL, TRIG in the last 168 hours.  Coagulation:     Recent Labs  Lab 17  2153   INR 0.9   APTT <21.0     Platelet Aggregation Study: No results for input(s): PLTAGG, PLTAGINTERP, PLTAGREGLACO, ADPPLTAGGREG in the last 168 hours.  Hgb A1C:     Recent Labs  Lab 17  0113   HGBA1C 5.1      TSH:     Recent Labs  Lab 06/06/17  2153   TSH 1.355       Diagnostic Results:  I have personally reviewed:   Findings:     CT Head. Date: 06/06/17  Interval cranioplasty in this patient with a history of right cerebral hemorrhagic infarct. No acute major territorial infarction or hemorrhage.    Left corona radiata lacunar infarct, likely remote although new since the prior exam.    Senescent changes.    Echo 10/2016  Severely enlarged LA   1 - Normal left ventricular systolic function (EF 65-70%).     2 - Left ventricular diastolic dysfunction.     3 - Biatrial enlargement.     4 - Normal right ventricular systolic function .     5 - Trivial to mild tricuspid regurgitation.     6 - Low central venous pressure.     7 - The estimated PA systolic pressure is 35 mmHg.     Assessment/Plan:     Lucy Perez is a 56 y.o. female with a PMHx of HTN, DM II, HLD, sarcodiosis, RA, CKD, and previous stroke (LSW) who presented with aphasia, staring, and clinched mouth. Patient was admitted to [facility] for potential respiratory issues and possible NCSE.    06/07/17 Patient has not had MRI. Treated with keppra and ativan on arrival. Evidence of seizure activity on EEG overnight. Plans for continuous EEG monitoring.    06/08/17 patient neurologically stable, remains nonverbal and not following commands, would recommend repeat head CT if unable to get MRI    6/10/17 - patient more alert during afternoon rounds, family at bedside reporting very little to no verbal output. Hooked up to EEG. Transfused today 1 unit prbc     06/12/17 MRI ordered, patient neurologically improved    Aphasia    56 y.o. female with significant past medical history of HTN, DM II on insulin, HLD, sarcoidosis, RA, CKD III, and residual left sided weakness due to previous stroke (2016) presented to hospital complaining of aphasia.  CTH negative for acute stroke.  Symptoms likely related to seizure, patient will be admitted to [facility] due to concern for  airway protection and possible status epilepticus. On exam, patient is more alert and following commands. She still has some RSW. MRI head without contrast pending.    Antiplatelets:  Aspirin: 81 mg oral daily  Atorvastatin- 40 mg oral daily  VTE Prophylaxis: Heparin 5000 units SQ every 8 hours  BP Parameters: SBP <180  Nursing Orders: Neuro checks- q1h                                    Essential hypertension    Per NCC - Will resume home medications: amlodipine, labetalol, and hydralazine  Will hold Lisinopril in setting of DARLING.  Stroke risk factor.  Plan as above.        Hyperlipidemia    -Stroke risk factor.  Plan as above.        * Partial symptomatic epilepsy with complex partial seizures, not intractable, without status epilepticus    -seizure activity seen on EEG  -plans for continuous EEG monitoring  -Managed by NCC  - adjusting carbamazepine and keppra         Type 2 diabetes mellitus with diabetic chronic kidney disease    -Stroke risk factor  -management per primary team                Lula Rodriguez PA-C  Comprehensive Stroke Center  Department of Vascular Neurology   [facility]

## 2017-06-12 NOTE — PROGRESS NOTES
Pt transported to MRI via bed by Rnx1 and PCTx1 via bed on portable monitor and O2.     1810 transported back to 7086 from MRi via bed, placed on bedside monitor and O2. Alarms set and audible. NCC notified of completion of MRI. BP outside of parameters, awaiting PRN BP med orders.

## 2017-06-12 NOTE — PROGRESS NOTES
In transport to MRI, pt began vomitting approx 200ml emesis. Pt returned to pushpa, Cortney JOVEL, and MRI notified of need to hold MRI at this time. Will reattempt later.

## 2017-06-12 NOTE — PLAN OF CARE
06/12/17 0748   Discharge Reassessment   Assessment Type Discharge Planning Reassessment   Can the patient answer the patient profile reliably? No, cognitively impaired   How does the patient rate their overall health at the present time? (kriss)   Describe the patient's ability to walk at the present time. Does not walk or unable to take any steps at all   How often would a person be available to care for the patient? Whenever needed   Number of comorbid conditions (as recorded on the chart) Five or more   During the past month, has the patient often been bothered by feeling down, depressed or hopeless? (kriss)   During the past month, has the patient often been bothered by little interest or pleasure in doing things? (kriss)   Discharge plan remains the same: (TBD)   Provided patient/caregiver education on the expected discharge date and the discharge plan No   Discharge Plan A Long-term acute care facility (LTAC)   Discharge Plan B Skilled Nursing Facility   Change in patient condition or support system No   Patient choice form signed by patient/caregiver N/A   Explained to the the patient/caregiver why the discharge planned changed: No   Involved the patient/caregiver in establishing a new discharge plan: Yes     Discharge plan to be determined once medically stable.    Candice Baker   [id number]

## 2017-06-12 NOTE — PROCEDURES
DATE OF SERVICE:  06/09/2017    ICU EEG/VIDEO MONITORING REPORT    METHODOLOGY:  Electroencephalographic (EEG) is recorded with electrodes placed   according to the International 10-20 placement system.  Thirty two (32) channels   of digital signal (sampling rate of 512/sec), including T1 and T2, were   simultaneously recorded from the scalp and may include EKG, EMG, and/or eye   monitors.  Recording band pass was 0.1 to 512 Hz.  Digital video recording of   the patient is simultaneously recorded with the EEG.  The patient is instructed   to report clinical symptoms which may occur during the recording session.  EEG   and video recording are stored and archived in digital format.  Activation   procedures, which include photic stimulation, hyperventilation and instructing   patients to perform simple tasks, are done in selected patients.    The EEG is displayed on a monitor screen and can be reviewed using different   montages.  Computer-assisted analysis is employed to detect spike and   electrographic seizure activity.   The entire record is submitted for computer   analysis.  The entire recording is visually reviewed, and the times identified   by computer analysis as being spikes or seizures are reviewed again.    Compressed spectral analysis (CSA) is also performed on the activity recorded   from each individual channel.  This is displayed as a power display of   frequencies from 0 to 30 Hz over time.   The CSA is reviewed looking for   asymmetries in power between homologous areas of the scalp, then compared with   the original EEG recording.    Method software was also utilized in the review of this study.  This software   suite analyzes the EEG recording in multiple domains.  Coherence and rhythmicity   are computed to identify EEG sections which may contain organized seizures.    Each channel undergoes analysis to detect the presence of spike and sharp waves   which have special and morphological  characteristics of epileptic activity.  The   routine EEG recording is converted from special into frequency domain.  This is   then displayed comparing homologous areas to identify areas of significant   asymmetry.  Algorithm to identify non-cortically generated artifact is used to   separate artifact from the EEG.    RECORDING TIMES:  The study begins June 9th at 0700.  Ends on June 10th at 0700.  Duration is 23 hours and 41 minutes.    EEG FINDINGS:  The background of this study consists primarily of mixed delta   and theta background with frequent epileptiform discharges noted in the right   hemisphere.  These do come and go and are absent during extremely quiescent   periods but during active periods, they take the form of PLEDs with a frequency   of approximately 1 per 2 seconds.  The discharges are sharp and epileptiform and   involve most of the right hemisphere and occasionally synchronously into the   left hemisphere, but are predominantly right sided.  The discharges rarely form   brief rhythmic runs lasting up to 2 to 3 seconds, but for the most part remain   periodic.  This is overall similar appearance to the prior 24 hours.  There were   noted clinical events, in which the patient was noted to have left gaze   deviation and right arm posturing.  These events were not correlated with   changes on the EEG.  Overnight, the epileptiform discharges are less prominent   in the last few hours of this recording.    INTERPRETATION:  Abnormal EEG due to moderate diffuse slowing and frequent right   hemispheric PLEDs and intermittent epileptiform discharges.  Continuous or   discrete seizure activity is not noted, but the presence of frequent PLEDs   indicates cortical irritability and the potential for seizures.      KIM  dd: 06/10/2017 10:40:31 (CDT)  td: 06/10/2017 19:07:32 (CDT)  Doc ID   #[id number]  Job ID #[id number]    CC:

## 2017-06-12 NOTE — ASSESSMENT & PLAN NOTE
56 y.o. female with significant past medical history of HTN, DM II on insulin, HLD, sarcoidosis, RA, CKD III, and residual left sided weakness due to previous stroke (2016) presented to hospital complaining of aphasia.  CTH negative for acute stroke.  Symptoms likely related to seizure, patient will be admitted to [facility] due to concern for airway protection and possible status epilepticus. On exam, patient is more alert and following commands. She still has some RSW. MRI head without contrast pending.    Antiplatelets:  Aspirin: 81 mg oral daily  Atorvastatin- 40 mg oral daily  VTE Prophylaxis: Heparin 5000 units SQ every 8 hours  BP Parameters: SBP <180  Nursing Orders: Neuro checks- q1h

## 2017-06-13 LAB
ALBUMIN SERPL BCP-MCNC: 2.7 G/DL
ALP SERPL-CCNC: 109 U/L
ALT SERPL W/O P-5'-P-CCNC: 8 U/L
ANION GAP SERPL CALC-SCNC: 6 MMOL/L
AST SERPL-CCNC: 13 U/L
BASOPHILS # BLD AUTO: 0.02 K/UL
BASOPHILS NFR BLD: 0.3 %
BILIRUB SERPL-MCNC: 0.2 MG/DL
BUN SERPL-MCNC: 36 MG/DL
CALCIUM SERPL-MCNC: 8.7 MG/DL
CHLORIDE SERPL-SCNC: 112 MMOL/L
CO2 SERPL-SCNC: 27 MMOL/L
CREAT SERPL-MCNC: 1.4 MG/DL
DIFFERENTIAL METHOD: ABNORMAL
EOSINOPHIL # BLD AUTO: 0.2 K/UL
EOSINOPHIL NFR BLD: 3.2 %
ERYTHROCYTE [DISTWIDTH] IN BLOOD BY AUTOMATED COUNT: 15.5 %
EST. GFR  (AFRICAN AMERICAN): 47.8 ML/MIN/1.73 M^2
EST. GFR  (NON AFRICAN AMERICAN): 41.4 ML/MIN/1.73 M^2
GLUCOSE SERPL-MCNC: 98 MG/DL
HCT VFR BLD AUTO: 23.8 %
HGB BLD-MCNC: 7.5 G/DL
LYMPHOCYTES # BLD AUTO: 1.2 K/UL
LYMPHOCYTES NFR BLD: 16.1 %
MAGNESIUM SERPL-MCNC: 2 MG/DL
MAGNESIUM SERPL-MCNC: 2.1 MG/DL
MCH RBC QN AUTO: 29.8 PG
MCHC RBC AUTO-ENTMCNC: 31.5 %
MCV RBC AUTO: 94 FL
MONOCYTES # BLD AUTO: 0.6 K/UL
MONOCYTES NFR BLD: 8.1 %
NEUTROPHILS # BLD AUTO: 5.4 K/UL
NEUTROPHILS NFR BLD: 71.9 %
PHOSPHATE SERPL-MCNC: 2.7 MG/DL
PLATELET # BLD AUTO: 224 K/UL
PMV BLD AUTO: 10.3 FL
POTASSIUM SERPL-SCNC: 4.4 MMOL/L
POTASSIUM SERPL-SCNC: 4.4 MMOL/L
PROT SERPL-MCNC: 5.9 G/DL
RBC # BLD AUTO: 2.52 M/UL
SODIUM SERPL-SCNC: 145 MMOL/L
WBC # BLD AUTO: 7.56 K/UL

## 2017-06-13 PROCEDURE — 25000003 PHARM REV CODE 250: Performed by: PHYSICIAN ASSISTANT

## 2017-06-13 PROCEDURE — 76937 US GUIDE VASCULAR ACCESS: CPT

## 2017-06-13 PROCEDURE — 97161 PT EVAL LOW COMPLEX 20 MIN: CPT

## 2017-06-13 PROCEDURE — 36569 INSJ PICC 5 YR+ W/O IMAGING: CPT

## 2017-06-13 PROCEDURE — 99233 SBSQ HOSP IP/OBS HIGH 50: CPT | Mod: GC,,, | Performed by: PSYCHIATRY & NEUROLOGY

## 2017-06-13 PROCEDURE — C1751 CATH, INF, PER/CENT/MIDLINE: HCPCS

## 2017-06-13 PROCEDURE — 20000000 HC ICU ROOM

## 2017-06-13 PROCEDURE — 94761 N-INVAS EAR/PLS OXIMETRY MLT: CPT

## 2017-06-13 PROCEDURE — 94640 AIRWAY INHALATION TREATMENT: CPT

## 2017-06-13 PROCEDURE — 63600175 PHARM REV CODE 636 W HCPCS: Performed by: PSYCHIATRY & NEUROLOGY

## 2017-06-13 PROCEDURE — 83735 ASSAY OF MAGNESIUM: CPT | Mod: 91

## 2017-06-13 PROCEDURE — G8997 SWALLOW GOAL STATUS: HCPCS | Mod: CJ

## 2017-06-13 PROCEDURE — 84100 ASSAY OF PHOSPHORUS: CPT

## 2017-06-13 PROCEDURE — 63600175 PHARM REV CODE 636 W HCPCS: Performed by: PHYSICIAN ASSISTANT

## 2017-06-13 PROCEDURE — 84132 ASSAY OF SERUM POTASSIUM: CPT

## 2017-06-13 PROCEDURE — 25000242 PHARM REV CODE 250 ALT 637 W/ HCPCS: Performed by: PHYSICIAN ASSISTANT

## 2017-06-13 PROCEDURE — 25000003 PHARM REV CODE 250: Performed by: PSYCHIATRY & NEUROLOGY

## 2017-06-13 PROCEDURE — 85025 COMPLETE CBC W/AUTO DIFF WBC: CPT

## 2017-06-13 PROCEDURE — 25000003 PHARM REV CODE 250: Performed by: NURSE PRACTITIONER

## 2017-06-13 PROCEDURE — 92610 EVALUATE SWALLOWING FUNCTION: CPT

## 2017-06-13 PROCEDURE — 25000003 PHARM REV CODE 250: Performed by: STUDENT IN AN ORGANIZED HEALTH CARE EDUCATION/TRAINING PROGRAM

## 2017-06-13 PROCEDURE — 92523 SPEECH SOUND LANG COMPREHEN: CPT

## 2017-06-13 PROCEDURE — 97530 THERAPEUTIC ACTIVITIES: CPT

## 2017-06-13 PROCEDURE — 83735 ASSAY OF MAGNESIUM: CPT

## 2017-06-13 PROCEDURE — 80053 COMPREHEN METABOLIC PANEL: CPT

## 2017-06-13 PROCEDURE — 27000221 HC OXYGEN, UP TO 24 HOURS

## 2017-06-13 PROCEDURE — G8996 SWALLOW CURRENT STATUS: HCPCS | Mod: CK

## 2017-06-13 RX ORDER — CIPROFLOXACIN 2 MG/ML
400 INJECTION, SOLUTION INTRAVENOUS
Status: DISCONTINUED | OUTPATIENT
Start: 2017-06-13 | End: 2017-06-14

## 2017-06-13 RX ORDER — ACETAMINOPHEN 325 MG/1
650 TABLET ORAL EVERY 6 HOURS PRN
Status: DISCONTINUED | OUTPATIENT
Start: 2017-06-13 | End: 2017-06-22 | Stop reason: HOSPADM

## 2017-06-13 RX ORDER — GABAPENTIN 300 MG/1
300 CAPSULE ORAL NIGHTLY
Status: DISCONTINUED | OUTPATIENT
Start: 2017-06-13 | End: 2017-06-22 | Stop reason: HOSPADM

## 2017-06-13 RX ORDER — DOCUSATE SODIUM 283 MG/5ML
1 LIQUID RECTAL ONCE
Status: COMPLETED | OUTPATIENT
Start: 2017-06-13 | End: 2017-06-13

## 2017-06-13 RX ADMIN — AMLODIPINE BESYLATE 10 MG: 10 TABLET ORAL at 08:06

## 2017-06-13 RX ADMIN — LABETALOL HCL 500 MG: 200 TABLET, FILM COATED ORAL at 01:06

## 2017-06-13 RX ADMIN — Medication 3 ML: at 06:06

## 2017-06-13 RX ADMIN — CIPROFLOXACIN 400 MG: 2 INJECTION, SOLUTION INTRAVENOUS at 06:06

## 2017-06-13 RX ADMIN — Medication 3 ML: at 09:06

## 2017-06-13 RX ADMIN — FAMOTIDINE 20 MG: 20 TABLET, FILM COATED ORAL at 06:06

## 2017-06-13 RX ADMIN — CARBAMAZEPINE 400 MG: 100 TABLET, CHEWABLE ORAL at 09:06

## 2017-06-13 RX ADMIN — HYDRALAZINE HYDROCHLORIDE 100 MG: 50 TABLET ORAL at 06:06

## 2017-06-13 RX ADMIN — DOCUSATE SODIUM 1 ENEMA: 283 LIQUID RECTAL at 12:06

## 2017-06-13 RX ADMIN — STANDARDIZED SENNA CONCENTRATE AND DOCUSATE SODIUM 1 TABLET: 8.6; 5 TABLET, FILM COATED ORAL at 09:06

## 2017-06-13 RX ADMIN — LABETALOL HCL 500 MG: 200 TABLET, FILM COATED ORAL at 09:06

## 2017-06-13 RX ADMIN — METOCLOPRAMIDE 10 MG: 5 INJECTION, SOLUTION INTRAMUSCULAR; INTRAVENOUS at 01:06

## 2017-06-13 RX ADMIN — ASPIRIN 81 MG CHEWABLE TABLET 81 MG: 81 TABLET CHEWABLE at 08:06

## 2017-06-13 RX ADMIN — ACETAMINOPHEN 650 MG: 325 TABLET ORAL at 07:06

## 2017-06-13 RX ADMIN — ALBUTEROL SULFATE 2.5 MG: 2.5 SOLUTION RESPIRATORY (INHALATION) at 08:06

## 2017-06-13 RX ADMIN — ALBUTEROL SULFATE 2.5 MG: 2.5 SOLUTION RESPIRATORY (INHALATION) at 12:06

## 2017-06-13 RX ADMIN — HYDRALAZINE HYDROCHLORIDE 10 MG: 20 INJECTION INTRAMUSCULAR; INTRAVENOUS at 06:06

## 2017-06-13 RX ADMIN — LEVETIRACETAM 1500 MG: 15 INJECTION INTRAVENOUS at 09:06

## 2017-06-13 RX ADMIN — HYDRALAZINE HYDROCHLORIDE 100 MG: 50 TABLET ORAL at 01:06

## 2017-06-13 RX ADMIN — STANDARDIZED SENNA CONCENTRATE AND DOCUSATE SODIUM 1 TABLET: 8.6; 5 TABLET, FILM COATED ORAL at 08:06

## 2017-06-13 RX ADMIN — LABETALOL HCL 500 MG: 200 TABLET, FILM COATED ORAL at 06:06

## 2017-06-13 RX ADMIN — HEPARIN SODIUM 5000 UNITS: 5000 INJECTION, SOLUTION INTRAVENOUS; SUBCUTANEOUS at 01:06

## 2017-06-13 RX ADMIN — HEPARIN SODIUM 5000 UNITS: 5000 INJECTION, SOLUTION INTRAVENOUS; SUBCUTANEOUS at 09:06

## 2017-06-13 RX ADMIN — POLYETHYLENE GLYCOL 3350 17 G: 17 POWDER, FOR SOLUTION ORAL at 08:06

## 2017-06-13 RX ADMIN — METOCLOPRAMIDE 10 MG: 5 INJECTION, SOLUTION INTRAMUSCULAR; INTRAVENOUS at 06:06

## 2017-06-13 RX ADMIN — MICONAZOLE NITRATE: 20 OINTMENT TOPICAL at 06:06

## 2017-06-13 RX ADMIN — HYDRALAZINE HYDROCHLORIDE 10 MG: 20 INJECTION INTRAMUSCULAR; INTRAVENOUS at 07:06

## 2017-06-13 RX ADMIN — HYDRALAZINE HYDROCHLORIDE 100 MG: 50 TABLET ORAL at 09:06

## 2017-06-13 RX ADMIN — HEPARIN SODIUM 5000 UNITS: 5000 INJECTION, SOLUTION INTRAVENOUS; SUBCUTANEOUS at 06:06

## 2017-06-13 RX ADMIN — GABAPENTIN 300 MG: 300 CAPSULE ORAL at 09:06

## 2017-06-13 RX ADMIN — Medication 3 ML: at 01:06

## 2017-06-13 RX ADMIN — ALBUTEROL SULFATE 2.5 MG: 2.5 SOLUTION RESPIRATORY (INHALATION) at 04:06

## 2017-06-13 RX ADMIN — CARBAMAZEPINE 400 MG: 100 TABLET, CHEWABLE ORAL at 06:06

## 2017-06-13 RX ADMIN — CARBAMAZEPINE 400 MG: 100 TABLET, CHEWABLE ORAL at 02:06

## 2017-06-13 RX ADMIN — LISINOPRIL 10 MG: 10 TABLET ORAL at 08:06

## 2017-06-13 RX ADMIN — ACETAMINOPHEN 650 MG: 325 TABLET ORAL at 11:06

## 2017-06-13 RX ADMIN — ALBUTEROL SULFATE 2.5 MG: 2.5 SOLUTION RESPIRATORY (INHALATION) at 03:06

## 2017-06-13 RX ADMIN — LEVETIRACETAM 1500 MG: 15 INJECTION INTRAVENOUS at 08:06

## 2017-06-13 NOTE — PT/OT/SLP EVAL
Physical Therapy  Evaluation    Lucy Perez   MRN: [id number]   Admitting Diagnosis: Partial symptomatic epilepsy with complex partial seizures, not intractable, without status epilepticus    PT Received On: 06/13/17  PT Start Time: 1005     PT Stop Time: 1025    PT Total Time (min): 20 min       Billable Minutes:  Evaluation 12 and Therapeutic Activity 8    Diagnosis: Partial symptomatic epilepsy with complex partial seizures, not intractable, without status epilepticus      Past Medical History:   Diagnosis Date    Acute hypoxemic respiratory failure     Acute on chronic diastolic heart failure 11/7/2016    Anemia of chronic disease 6/10/2017    Anxiety     Asthma     Bipolar disorder     Bronchitis, acute     Cataract     CKD (chronic kidney disease) stage 3, GFR 30-59 ml/min     stage 3    Depression     Diabetes with neurologic complications     General anesthetics causing adverse effect in therapeutic use     Hemorrhagic cerebrovascular accident (CVA)     8/2016 s/p Hemicraniot[place] at [facility] with Left hemiparesis    Hyperlipidemia     Hypertension     Obesity     Partial symptomatic epilepsy with complex partial seizures, not intractable, without status epilepticus     Peripheral neuropathy     Pneumonia     Rheumatoid arthritis     Sarcoidosis     Sleep apnea     CPAP    Type II or unspecified type diabetes mellitus with renal manifestations, uncontrolled       Past Surgical History:   Procedure Laterality Date    breast reduction      BREAST SURGERY      breast reduction    COLONOSCOPY N/A 8/11/2016    Procedure: COLONOSCOPY;  Surgeon: Jerry Vilchis MD;  Location: [address]);  Service: Endoscopy;  Laterality: N/A;  Patient reports difficulty awaking from anesthesia in the past.    HYSTERECTOMY      ROTATOR CUFF REPAIR Right July 9, 2014    right side    stent placed      in vertebral artery    TUBAL LIGATION         Referring physician:Cortney Cifuentes  Date  referred to PT: 6/12/2017    General Precautions: Standard, fall, aspiration  Orthopedic Precautions: N/A   Braces: N/A            Patient History:  Lives With: spouse  Living Arrangements: house  Home Accessibility: stairs to enter home (wheelchair accessible with lift outside)  Home Layout: Able to live on 1st floor  Number of Stairs to Enter Home: 8  Stair Railings at Home: outside, present at both sides  Transportation Available: family or friend will provide  Living Environment Comment: Pt lives in one [facility] home c/ 8 DARYL and elevator present. PTA, pt req shaunna lift for transfers and occassionally sat EOB with family. Owns and uses multiple DME.  able to assist upon discharge as well as dgt. Receiving  PT as well.  aide coming 2x week for self care needs  Equipment Currently Used at Home: wheelchair, hospital bed, lift device  DME owned (not currently used): rolling walker and transfer tub bench    Previous Level of Function:  Ambulation Skills: unable to perform  Transfer Skills: needs device  ADL Skills: needs assist  Work/Leisure Activity: needs assist    Subjective:  Communicated with nsg prior to session.    Chief Complaint: pain in R knee with movement  Patient goals: to improve movement    Pain/Comfort  Pain Rating 1: 10/10  Location - Side 1: Right  Location 1: knee  Pain Addressed 1: Reposition, Distraction, Nurse notified  Pain Rating Post-Intervention 1: 10/10    Pt agreeable to treatment session.    Objective:   Patient found with: arterial line, blood pressure cuff, bright catheter, pressure relief boots, pulse ox (continuous), SCD, telemetry, peripheral IV; found supine in bed c/  at bedside     Cognitive Exam:  Oriented to: Person, Place, Time and Situation    Follows Commands/attention: Follows one-step commands, delayed with answering commands  Communication: dysarthria  Safety awareness/insight to disability: impaired    Physical Exam:  Postural examination/scapula alignment:  Rounded shoulder and Head forward; unable to observe further due to supine position     Skin integrity: Thin and Dry  Edema: Moderate to (B) Knees    Sensation:   Intact  light/touch (R) LE and UE and Impaired  light/touch (tingling to L UE, numbness to L LE)     Upper Extremity Range of Motion:  Right Upper Extremity: WFL  Left Upper Extremity: no active movement, limited PROM (unable to reach 90 deg shoulder flexion and incr finger and wrist flexion demonstrated)    Upper Extremity Strength:  Right Upper Extremity: WFL  Left Upper Extremity: 0/5 active movement demonstrated during session    Lower Extremity Range of Motion:  Right Lower Extremity: WFL  Left Lower Extremity: limited hip flexion with PROM, WFL for remaining planes; no AROM denoted    Lower Extremity Strength:  Right Lower Extremity: 3/5 hip flexion, knee ext/flex, hip abd/add  Left Lower Extremity: 0/5     Fine motor coordination:  Impaired  Right hand thumb/finger opposition skills mild    Gross motor coordination: impaired coordination between extremities and trunk producing delayed gross movements via rolling during session.     Functional Mobility:  Bed Mobility:  Rolling/Turning to Left: Maximum assistance, With side rail      Therapeutic Activities and Exercises:  PT educated pt and pt  regarding role of PT, PT POC, and frequency of POC.  - educated on performing PROM x3 occurrences daily to assist c/ improving movement and pain  - bed to chair function and utilization to assist c/ stretching trunk musculature and upright positioning.    White board updated with pertinent information. Rolling assessed x2 on today to assist c/ improving overall mobility.     AM-PAC 6 CLICK MOBILITY  How much help from another person does this patient currently need?   1 = Unable, Total/Dependent Assistance  2 = A lot, Maximum/Moderate Assistance  3 = A little, Minimum/Contact Guard/Supervision  4 = None, Modified [place]/Independent    Turning over  in bed (including adjusting bedclothes, sheets and blankets)?: 2  Sitting down on and standing up from a chair with arms (e.g., wheelchair, bedside commode, etc.): 1  Moving from lying on back to sitting on the side of the bed?: 1  Moving to and from a bed to a chair (including a wheelchair)?: 1  Need to walk in hospital room?: 1  Climbing 3-5 steps with a railing?: 1  Total Score: 7     AM-PAC Raw Score CMS G-Code Modifier Level of Impairment Assistance   6 % Total / Unable   7 - 9 CM 80 - 100% Maximal Assist   10 - 14 CL 60 - 80% Moderate Assist   15 - 19 CK 40 - 60% Moderate Assist   20 - 22 CJ 20 - 40% Minimal Assist   23 CI 1-20% SBA / CGA   24 CH 0% Independent/ Mod I     Patient left supine with all lines intact and call button in reach.    Assessment:   Lucy Perez is a 56 y.o. female with a medical diagnosis of Partial symptomatic epilepsy with complex partial seizures, not intractable, without status epilepticus and presents with impaired balance, mobility, endurance, range of motion, strength, and coordination during today's session. Pt near baseline in terms of mobility on today. Presents with incr R knee pain and fair strength with R UE and LE; poor correlation with performing exercises on today.  Incr stiffness and no active movement identified on L side; req incr assistance with limited bed mobility. Appropriate for limited skilled services to assist c/ carryover with activity and movement for discharge home c/ continuation of HH PT. Will be seen for 1-2 more skilled services to address current goals.     Rehab identified problem list/impairments: Rehab identified problem list/impairments: weakness, impaired endurance, impaired sensation, gait instability, impaired functional mobilty, impaired self care skills, impaired balance, decreased upper extremity function, decreased lower extremity function, decreased coordination, decreased safety awareness, pain, decreased ROM, impaired  joint extensibility, impaired cardiopulmonary response to activity, impaired fine motor, impaired skin    Rehab potential is fair.    Activity tolerance: Poor    Discharge recommendations: Discharge Facility/Level Of Care Needs: home health PT     Barriers to discharge:      Equipment recommendations: Equipment Needed After Discharge: none     GOALS:    Physical Therapy Goals        Problem: Physical Therapy Goal    Goal Priority Disciplines Outcome Goal Variances Interventions   Physical Therapy Goal     PT/OT, PT Ongoing (interventions implemented as appropriate)     Description:  Goals to be met by: 7 days ()    Patient will increase functional independence with mobility by performin. Rolling to Left with Moderate Assistance.  2. Lower extremity exercise program x10 reps per handout, with assistance as needed  3. Family will report performing PROM x 20 reps to (B) LE and (B) UE.                        PLAN:    Patient to be seen 3 x/week to address the above listed problems via therapeutic activities, therapeutic exercises  Plan of Care expires: 17  Plan of Care reviewed with: spouse, patient          Bee Khan, PT, DPT  2017

## 2017-06-13 NOTE — PLAN OF CARE
Problem: Physical Therapy Goal  Goal: Physical Therapy Goal  Goals to be met by: 7 days ()    Patient will increase functional independence with mobility by performin. Rolling to Left with Moderate Assistance.  2. Lower extremity exercise program x10 reps per handout, with assistance as needed  3. Family will report performing PROM x 20 reps to (B) LE and (B) UE.      Outcome: Ongoing (interventions implemented as appropriate)  PT eval completed. POC initiated. Appropriate for limited skilled services to assist c/ improving in bed mobility in home environment.    Bee Khan, PT, DPT  2017

## 2017-06-13 NOTE — PROGRESS NOTES
[facility]  Neurocritical Care  Progress Note    Admit Date: 6/6/2017  Service Date: 06/13/2017  Length of Stay: 7    Subjective:     Chief Complaint: Partial symptomatic epilepsy with complex partial seizures, not intractable, without status epilepticus    History of Present Illness: 56 y woman who presents with aphasia, unresponsiveness. Past R MCA stroke post hemicraniectomy, possibly complicated by Epilepsy, CHF, DM2, HTN, HLD, sarcoid, RA, CKD.    Hospital Course: 6/7 admission to [facility]  6/8 R PLED-> increased carbamazepine, levetiracetam  6/9 waxing/waning R PLED but no seizure  6/12: Patient came because of SZ. R PLEDS and slowing and no ictal activity.  6/13: MRI of the brain is stable. No acute strokes. Patient had severe tigh pain. Tegretol level 10.3. Serum Na OK. Enterbacter resistant to Ceftriaxine. Antibiotics were changed to Cipro. RLE tenderness. Dermatology consultation left recommendations.    Interval History: >4 elements OR status of 3 inpatient conditions  Patient came because of SZ. R PLEDS and slowing and no ictal activity. MRI of the brain is stable. No acute strokes. Patient had severe tigh pain. Tegretol level 10.3. Serum Na OK. Enterbacter resistant to Ceftriaxine. Antibiotics were changed to Cipro. RLE tenderness.   Dermatology consultation left recommendations.    Review of Systems   HENT: Negative.    Eyes: Negative.    Respiratory: Negative.    Cardiovascular: Negative.    Gastrointestinal: Negative.    Musculoskeletal: Positive for myalgias.   Neurological: Positive for weakness.     2 systems OR Unable to obtain a complete ROS due to level of consciousness.  Objective:     Vitals:  Temp: 99.4 °F (37.4 °C) (06/13/17 1500)  Pulse: 77 (06/13/17 1700)  Resp: (!) 22 (06/13/17 1700)  BP: 116/62 (06/13/17 1500)  SpO2: 100 % (06/13/17 1700)    Temp:  [97.8 °F (36.6 °C)-99.5 °F (37.5 °C)] 99.4 °F (37.4 °C)  Pulse:  [61-80] 77  Resp:  [5-30] 22  SpO2:  [97 %-100 %] 100  %  BP: (111-204)/(56-90) 116/62  Arterial Line BP: ()/(44-92) 171/71              06/12 0701 - 06/13 0700  In: 1750 [I.V.:870]  Out: 810 [Urine:610]    Physical Exam   Neurological: GCS eye subscore is 4 - spontaneous. GCS verbal subscore is 5 - oriented. GCS motor subscore is 6 - obeys commands.     General   HEENT: Prior R crani.  Chest Heart RRR / Lungs Clear to auscultation  Abdomen: Soft nontender + BS  Extremities: OK distal pulses. Significant RLE atrophy.    Skin: Intertrigo.  Neurological Exam:  MS; Awake, following simple commands. Verbal output.  CN: II-XII  L UMN VII.  Motor: LUE   0/5 / RUE  /5  Tone normal bilaterally L- spasticity.             LLE  0 /5 /  RLE  /5  Tone normal bilaterally  RLE pain with movement.  Sensory: LT/PP/T/ Vibration UK                 Complex sensory modalities: not tested  DTR:  normal throughout.  Coordination /Fine motor: UK  Gait: Not tested.  Meningeal signs: Absent     Unable to test judgment, insight, coordination, gait due to level of consciousness.        Raul Coma Scale:  Best Eye Response: 4 - spontaneous  Best Motor Response: 6 - obeys commands  Best Verbal Response: 5 - oriented  Roy Coma Scale Total: 15            Medications:  Continuous  sodium chloride 0.9% Last Rate: 50 mL/hr at 06/13/17 1700   Scheduled  albuterol sulfate 2.5 mg Q4H   amlodipine 10 mg Daily   aspirin 81 mg Daily   carbamazepine 400 mg Q8H   ciprofloxacin 400 mg Q12H   famotidine 20 mg Daily   heparin (porcine) 5,000 Units Q8H   hydrALAZINE 100 mg Q8H   labetalol 500 mg TID   levetiracetam IVPB 1,500 mg Q12H   lisinopril 10 mg Daily   miconazole nitrate 2%  BID   polyethylene glycol 17 g Daily   senna-docusate 8.6-50 mg 1 tablet BID   sodium chloride 0.9% 3 mL Q8H   PRN  sodium chloride  Q24H PRN   acetaminophen 650 mg Q6H PRN   dextrose 50% 12.5 g PRN   glucagon (human recombinant) 1 mg PRN   hydrALAZINE 10 mg Q4H PRN   insulin aspart 1-10 Units Q6H PRN   magnesium oxide 800 mg  PRN   magnesium oxide 800 mg PRN   potassium chloride 10% 40 mEq PRN   potassium chloride 10% 40 mEq PRN   potassium chloride 10% 60 mEq PRN   potassium, sodium phosphates 2 packet PRN   potassium, sodium phosphates 2 packet PRN   potassium, sodium phosphates 2 packet PRN     Today I personally reviewed pertinent medications, lines/drains/airways, imaging, cardiology, lab results, microbiology results, notably:     Assessment/Plan:       Active Problem List:   1.Partial Sz activity.  2. Prior R-putaminal ICH.  3. UTI with enterobacter.   4. R-hypertensive bleeding on the R-putamen. S/P R decompressive hemicrani.  5. Intertrigo      Assessment / Plan:     Neuro:  -Keppra 1.5 gr q 12hrs.  -Tegretol 400 mg q 8hrs.   -ECASA 81mg qd.  -Speech evaluation.  -PT/OT  Resp:  -RA  -IS  CV: HTN, bilatrial enlargement and LVDDF, Keep SBP <=160 mmHg,  -ECASA 81mg qd.  -Labetalol 500mg q8hrs.  -Amlodipine 10mg qg  -Hydralazine 100mg q 8hrs  -Lisinopril 10mg qd.   IVF/nutrition/Renal/GI: No ileus.  -NPO. Thickened liquids PO diet when he restarts.   -Up BR: senna , colace, snna , myralax. Enema today.   -NS at 100cc/hr until TF at goal.  -Reglan 10mg q 6hrs.  -Bladder scan.  -Has pure wick.  Hem / ID: UTI with enterobacter resistant. .  SC Heparin prophylaxis.  -Cipro x 7 days for UTI.   -US on RLE.  Endo:  -SSI q 6hrs.  -Detemir 10 U qhs (hold if not on full TF).  Prophylaxis:  SC Heparin prophylaxis.  Derm: Intertrigo due to moisture with yeast noted to patient's left abdominal fold, and multiple areas of shearing noted to patient's buttocks with surrounding tissue with dry flaky skin resembling yeast.   -Clear barrier cream with miconazole to abdominal folds, buttocks, and posterior thighs BID.   Advance Directives and Disposition:    Full Code. Transfer to medicine and followed by Neurology team / epilepsy. Later today.           Uninterrupted level 3  Follow-up /Counseling Time (not including procedures):  = 35 min    Activity  Orders          Activity as tolerated starting at 06/07 2883        Full Code    Fritz Valdez MD  Neurocritical Care  [facility]

## 2017-06-13 NOTE — PLAN OF CARE
Problem: SLP Goal  Goal: SLP Goal  Bedside swallow eval completed. Recommend dental soft diet/nectar thick liquids.   Kacy Bennett CCC-SLP   6/13/2017

## 2017-06-13 NOTE — PROGRESS NOTES
[facility]  Neurocritical Care  Progress Note    Admit Date: 6/6/2017  Service Date: 06/12/2017  Length of Stay: 6    Subjective:     Chief Complaint: Partial symptomatic epilepsy with complex partial seizures, not intractable, without status epilepticus    History of Present Illness: 56 y woman who presents with aphasia, unresponsiveness. Past R MCA stroke post hemicraniectomy, possibly complicated by Epilepsy, CHF, DM2, HTN, HLD, sarcoid, RA, CKD.    Hospital Course: 6/7 admission to [facility]  6/8 R PLED-> increased carbamazepine, levetiracetam  6/9 waxing/waning R PLED but no seizure  6/12: Patient came because of SZ. R PLEDS and slowing and no ictal activity.    Interval History:  >4 elements OR status of 3 inpatient conditions  Patient came because of SZ. R PLEDS and slowing and no ictal activity.  Review of Systems  2 systems OR Unable to obtain a complete ROS due to level of consciousness.  Objective:     Vitals:  Temp: 98 °F (36.7 °C) (06/12/17 1900)  Pulse: 73 (06/12/17 2326)  Resp: 14 (06/12/17 2326)  BP: (!) 169/67 (06/12/17 0300)  SpO2: 98 % (06/12/17 2300)    Temp:  [97.3 °F (36.3 °C)-98.4 °F (36.9 °C)] 98 °F (36.7 °C)  Pulse:  [59-80] 73  Resp:  [5-30] 14  SpO2:  [93 %-100 %] 98 %  BP: (169)/(67) 169/67  Arterial Line BP: ()/(48-86) 93/63              06/11 0701 - 06/12 0700  In: 770   Out: 1050 [Urine:1050]    Physical Exam   Neurological: GCS eye subscore is 4 - spontaneous. GCS verbal subscore is 1 - none. GCS motor subscore is 6 - obeys commands.         General   HEENT: Prior R crani.  Chest Heart RRR / Lungs Clear to auscultation  Adbomen: Soft nontender + BS  Extremities: OK distal pulses.  Skin: UK  Neurological Exam:  MS; Awake, following simple commands. Verbal output.  CN: II-XII  L UMN VII.  Motor: LUE   0/5 / RUE 5 /5  Tone normal bilaterally L- spasticity.             LLE  0 /5 /  RLE   5/5  Tone normal bilaterally  Sensory: LT/PP/T/ Vibration UK                  Complex sensory modalities: not tested  DTR:  normal throughout.  Coordination /Fine motor: ot tested  Gait: Not tested.  Meningeal signs: Absent  Unable to test memory, judgment, insight, fund of knowledge, gait due to level of consciousness.        Raul Coma Scale:  Best Eye Response: 4 - spontaneous  Best Motor Response: 6 - obeys commands  Best Verbal Response: 1 - none  [place] Coma Scale Total: 11            Medications:  Continuous  sodium chloride 0.9% Last Rate: 50 mL/hr at 06/12/17 2300   Scheduled  albuterol sulfate 2.5 mg Q4H   amlodipine 10 mg Daily   aspirin 81 mg Daily   carbamazepine 400 mg Q8H   ciprofloxacin 400 mg Q8H   famotidine 20 mg Daily   heparin (porcine) 5,000 Units Q8H   hydrALAZINE 100 mg Q8H   insulin detemir 10 Units QHS   labetalol 500 mg TID   levetiracetam IVPB 1,500 mg Q12H   [START ON 6/13/2017] lisinopril 10 mg Daily   metoclopramide HCl 10 mg Q8H   [START ON 6/13/2017] polyethylene glycol 17 g Daily   senna-docusate 8.6-50 mg 1 tablet BID   sodium chloride 0.9% 3 mL Q8H   PRN  sodium chloride  Q24H PRN   dextrose 50% 12.5 g PRN   glucagon (human recombinant) 1 mg PRN   hydrALAZINE 10 mg Q4H PRN   insulin aspart 1-10 Units Q6H PRN   magnesium oxide 800 mg PRN   magnesium oxide 800 mg PRN   potassium chloride 10% 40 mEq PRN   potassium chloride 10% 40 mEq PRN   potassium chloride 10% 60 mEq PRN   potassium, sodium phosphates 2 packet PRN   potassium, sodium phosphates 2 packet PRN   potassium, sodium phosphates 2 packet PRN     Today I personally reviewed pertinent medications, lines/drains/airways, imaging, cardiology, lab results, microbiology results, notably:     Assessment/Plan:     Neuro   * Partial symptomatic epilepsy with complex partial seizures, not intractable, without status epilepticus    -condition is improving  -remove cEEG  -levetiracetam  -carbamazepine        Cardiac   Essential hypertension    -hydralazine increase  -amlodipine, labetalol, lisinopril         Renal   Type 2 diabetes mellitus with diabetic chronic kidney disease    -monitor I/O  -detemir, ISS        Other   Anemia of chronic disease    -HGB >7        [place] coma scale total score 9-12    -postictal >ictal  -see epilepsy          Active Problem List:   1.Partial Sz activity.  2. Prior R-putaminal ICH.  3. UTI with enterobacter.   4. R-hypertensive bleeding on the R-putamen. S/P R decompressive hemicrani.     Assessment / Plan:     Neuro:  -Keppra 1.5 gr q 12hrs.  -Tegretol 400 mg q 8hrs.   -Get Tegretol trough level.  -ECASA 81mg qd.  -Speech evaluation.  -PT/OT  Resp:  -RA  -IS  CV: HTN, bilatrial enlargement and LVDDF, Keep SBP <=160 mmHg,  -ECASA 81mg qd.  -Labetalol 500mg q8hrs.  -Amlodipine 10mg qg  -Hydralazine 100mg q 8hrs  -Lisinopril 10mg qd.   IVF/nutrition/Renal/GI: No ileus.  -Restart TF  -Up BR: senna , colace, snna , myralax. Dulcolax sup.  and enema  -NS at 100cc/hr until TF at goal.  -Bladder scan.  -Has pure wick.  Hem / ID: UTI with enterobacter resistant. .  SC Heparin prophylaxis.  -D/C Ceftriaxone   -Start Cipro x 7 days for UTI.   Endo:  -SSI q 6hrs.  -Detemir 10 U qhs (hold if not on full TF).  Prophylaxis:  SC Heparin prophylaxis.  Advance Directives and Disposition:    Full Code. Transfer to medicine and followed by Neurology team / epilepsy. Later today.           Uninterrupted level 3  Follow-up /Counseling Time (not including procedures):  = 35 min       Activity Orders          Activity as tolerated starting at 06/07 1158        Full Code    Fritz Valdez MD  Neurocritical Care  [facility]

## 2017-06-13 NOTE — SUBJECTIVE & OBJECTIVE
Interval History:  >4 elements OR status of 3 inpatient conditions  Patient came because of SZ. R PLEDS and slowing and no ictal activity.  Review of Systems  2 systems OR Unable to obtain a complete ROS due to level of consciousness.  Objective:     Vitals:  Temp: 98 °F (36.7 °C) (06/12/17 1900)  Pulse: 73 (06/12/17 2326)  Resp: 14 (06/12/17 2326)  BP: (!) 169/67 (06/12/17 0300)  SpO2: 98 % (06/12/17 2300)    Temp:  [97.3 °F (36.3 °C)-98.4 °F (36.9 °C)] 98 °F (36.7 °C)  Pulse:  [59-80] 73  Resp:  [5-30] 14  SpO2:  [93 %-100 %] 98 %  BP: (169)/(67) 169/67  Arterial Line BP: ()/(48-86) 93/63              06/11 0701 - 06/12 0700  In: 770   Out: 1050 [Urine:1050]    Physical Exam   Neurological: GCS eye subscore is 4 - spontaneous. GCS verbal subscore is 1 - none. GCS motor subscore is 6 - obeys commands.         General   HEENT: Prior R crani.  Chest Heart RRR / Lungs Clear to auscultation  Adbomen: Soft nontender + BS  Extremities: OK distal pulses.  Skin: UK  Neurological Exam:  MS; Awake, following simple commands. Verbal output.  CN: II-XII  L UMN VII.  Motor: LUE   0/5 / RUE 5 /5  Tone normal bilaterally L- spasticity.             LLE  0 /5 /  RLE  5/5  Tone normal bilaterally  Sensory: LT/PP/T/ Vibration                  Complex sensory modalities: not tested  DTR:  normal throughout.  Coordination /Fine motor: ot tested  Gait: Not tested.  Meningeal signs: Absent  Unable to test memory, judgment, insight, fund of knowledge, gait due to level of consciousness.        [place] Coma Scale:  Best Eye Response: 4 - spontaneous  Best Motor Response: 6 - obeys commands  Best Verbal Response: 1 - none  [place] Coma Scale Total: 11            Medications:  Continuous  sodium chloride 0.9% Last Rate: 50 mL/hr at 06/12/17 2300   Scheduled  albuterol sulfate 2.5 mg Q4H   amlodipine 10 mg Daily   aspirin 81 mg Daily   carbamazepine 400 mg Q8H   ciprofloxacin 400 mg Q8H   famotidine 20 mg Daily   heparin (porcine)  5,000 Units Q8H   hydrALAZINE 100 mg Q8H   insulin detemir 10 Units QHS   labetalol 500 mg TID   levetiracetam IVPB 1,500 mg Q12H   [START ON 6/13/2017] lisinopril 10 mg Daily   metoclopramide HCl 10 mg Q8H   [START ON 6/13/2017] polyethylene glycol 17 g Daily   senna-docusate 8.6-50 mg 1 tablet BID   sodium chloride 0.9% 3 mL Q8H   PRN  sodium chloride  Q24H PRN   dextrose 50% 12.5 g PRN   glucagon (human recombinant) 1 mg PRN   hydrALAZINE 10 mg Q4H PRN   insulin aspart 1-10 Units Q6H PRN   magnesium oxide 800 mg PRN   magnesium oxide 800 mg PRN   potassium chloride 10% 40 mEq PRN   potassium chloride 10% 40 mEq PRN   potassium chloride 10% 60 mEq PRN   potassium, sodium phosphates 2 packet PRN   potassium, sodium phosphates 2 packet PRN   potassium, sodium phosphates 2 packet PRN     Today I personally reviewed pertinent medications, lines/drains/airways, imaging, cardiology, lab results, microbiology results, notably:

## 2017-06-13 NOTE — PROGRESS NOTES
Consult received on patient. Intertrigo due to moisture with yeast noted to patient's left abdominal fold, and multiple areas of shearing noted to patient's buttocks with surrounding tissue with dry flaky skin resembling yeast. Recommend clear barrier cream with miconazole to abdominal folds, buttocks, and posterior thighs BID. Telephone order received from Cortney JOVEL with NCC. Nursing to continue care.     06/13/17 1550       Wound 06/13/17 1550 Intertrigo;Moisture associated dermatitis lower abdomen   Date First Assessed/Time First Assessed: 06/13/17 1550   Wound Type: Intertrigo;Moisture associated dermatitis  Side: Left  Orientation: lower  Location: abdomen   Wound WDL ex   Drainage Amount scant   Drainage Characteristics/Odor serous;no odor   Wound Base moist;white;yeast   Periwound Area moist   Wound Length (cm) 1   Wound Width (cm) 6   Depth (cm) 0   Non-staged Wound Description Partial thickness       Wound 06/13/17 1550 Shearing gluteal   Date First Assessed/Time First Assessed: 06/13/17 1550   Wound Type: Shearing  Location: gluteal   Wound WDL ex  (multiple areas of shearing)   Drainage Amount none   Drainage Characteristics/Odor no odor   Wound Base moist;pink   Periwound Area dry  (yeast)   Wound Edges open   Wound Length (cm) (multiple areas of shearing ranging from 0.2cm-1cm diameter)   Non-staged Wound Description Partial thickness

## 2017-06-13 NOTE — PLAN OF CARE
Problem: Patient Care Overview  Goal: Plan of Care Review  Outcome: Ongoing (interventions implemented as appropriate)  POC reviewed with patient and family at 1400. Patient needs reinforcement in education. Questions and concerns addressed with spouse. Patient given enema to help induce a BM, no BM as of now. Wound care assessed the patient. RN ordered to give miconazole under folds, buttocks, and posterior thighs. No acute events today. Patient progressing toward goals. RN will continue to monitor. See flowsheets for full assessment and VS info.

## 2017-06-13 NOTE — PT/OT/SLP EVAL
Speech Language Pathology Evaluation    Lucy Perez   MRN: [id number]   Admitting Diagnosis: Partial symptomatic epilepsy with complex partial seizures, not intractable, without status epilepticus    Diet recommendations: Solid Diet Level: Dental Soft  Liquid Diet Level: Nectar Thick Feed only when awake/alert, HOB to 90 degrees, Small bites/sips, Alternating bites/sips and 1 bite/sip at a time    SLP Treatment Date: 06/13/17  Speech Start Time: 0810     Speech Stop Time: 0831     Speech Total (min): 21 min       TREATMENT BILLABLE MINUTES:  Eval 11  and Eval Swallow and Oral Function 10    Diagnosis: Partial symptomatic epilepsy with complex partial seizures, not intractable, without status epilepticus      Past Medical History:   Diagnosis Date    Acute hypoxemic respiratory failure     Acute on chronic diastolic heart failure 11/7/2016    Anemia of chronic disease 6/10/2017    Anxiety     Asthma     Bipolar disorder     Bronchitis, acute     Cataract     CKD (chronic kidney disease) stage 3, GFR 30-59 ml/min     stage 3    Depression     Diabetes with neurologic complications     General anesthetics causing adverse effect in therapeutic use     Hemorrhagic cerebrovascular accident (CVA)     8/2016 [place] [facility] [place]iparesis    Hyperlipidemia     Hypertension     Obesity     Partial symptomatic epilepsy with complex partial seizures, not intractable, without status epilepticus     Peripheral neuropathy     Pneumonia     Rheumatoid arthritis     Sarcoidosis     Sleep apnea     CPAP    Type II or unspecified type diabetes mellitus with renal manifestations, uncontrolled      Past Surgical History:   Procedure Laterality Date    breast reduction      BREAST SURGERY      breast reduction    COLONOSCOPY N/A 8/11/2016    Procedure: COLONOSCOPY;  Surgeon: Jerry Vilchis MD;  Location: [address];  Service: Endoscopy;  Laterality: N/A;  Patient reports  "difficulty awaking from anesthesia in the past.    HYSTERECTOMY      ROTATOR CUFF REPAIR Right July 9, 2014    right side    stent placed      in vertebral artery    TUBAL LIGATION         Has the patient been evaluated by SLP for swallowing? : Yes  Keep patient NPO?: No   General Precautions: Standard, fall, aspiration          Social Hx: Patient lives with spouse.    Prior diet: Regular/thin, PEG for meds at home.    Subjective:  Awake/alert  " Her speech is a little more slurred and hoarse." Re spouse     Pain/Comfort  Pain Rating 1: 0/10  Pain Rating Post-Intervention 1: 0/10    Objective:        Oral Musculature Evaluation  Oral Musculature: general weakness, left weakness  Dentition: present and adequate  Mandibular Strength and Mobility: WFL  Oral Labial Strength and Mobility: impaired retraction, impaired pursing  Lingual Strength and Mobility: impaired strength, functional protrusion, functional lateral movement  Volitional Cough: weak  Voice Prior to PO Intake: hoarse and slurred     Cognitive Status:  Behavioral Observations: alert, appropriate and cooperative-  Memory and Orientation: oriented to place, situation and month ind'ly, year cues. Immediate 5 numbers, delayed 2/3 cued  Attention: [facility].  Problem Solving: Conclusions: 100%  Reasoning: able to compare/unable to contrast    Organization: categories 2/3 cued    Language: [facility]    Auditory Comprehension: answers yes/no questions complex 100% and able to follow commands 2 step 100%, complex 3/4    Verbal Expression: [facility]    Motor Speech: dysarthria    Voice: hoarse    Augmentative Alternative Communication: no    Bedside Swallow Eval:  Consistencies Assessed: Thin liquids x8 spoon, cup, straw, Nectar thick liquids xx4 straw, Puree x2 and Solids x1  Oral Phase: decreased closure around utensil and slow oral transit time  Pharyngeal Phase: decreased hyolaryngeal excursion vocal changes and throat clear noted with thin trials. No overt s/s of aspiration " noted with nectar thick liquids, puree or solid trials.         FIM:  Social Interaction: 6 Complete [place]--The patient interacts appropriately with staff, other patients, and family members (e.g., controls temper, accepts criticism, is aware that words and actions have an impact on others), and does not require medication for   Problem Solvin Minimal Direction--The patient solves routine problems 75 to 90% of the time.   Comprehension: 5 Standby Prompting--The patient understands directions and conversation about basic daily needs more than 90% of the time.  The patient requires prompting (slowed speech rate, use of repetition, stressing particular words or phrases, pauses, visual or   Expression: 4 Minimal Prompting--The patient expresses basic daily needs and ideas 75 to 90% of the time.   Memory: 3 Moderate Prompting--The patient recognizes and remembers 50 to 74% of the time.     Assessment:  Lucy Perez is a 56 y.o. female with a SLP diagnosis of Dysphagia, Dysarthria and Cognitive-Linguistic Impairment.          Goals:    SLP Goals        Problem: SLP Goal    Goal Priority Disciplines Outcome   SLP Goal     SLP    Description:  Speech Language Pathology Goals  Goals expected to be met by     1. Pt will tolerate dental soft diet/nectar thick liquids without overt s/s of aspiration  2. Pt will tolerate thin liquid trials x10 without overt s/s of aspiration  3. Pt will complete OME x10 reps with min cues  4. Pt will complete recall tasks with 70% accuracy and min cues  5. Pt will follow 3 step commands with 75% accuracy and min cues  6. Pt will complete problem solving tasks with 80% accuracy and min cues                         Plan:   Patient to be seen Therapy Frequency: 5 x/week   Plan of Care expires: 17  Plan of Care reviewed with: patient, spouse  SLP Follow-up?: Yes         SLP G-Codes  Functional Assessment Tool Used: NOMS  Score: 4  Functional Limitations:  Swallowing  Swallow Current Status (): CK  Swallow Goal Status (): CJ    Kacy Bennett CCC-SLP   Speech Language Pathologist  Pager [phone]  06/13/2017

## 2017-06-13 NOTE — CONSULTS
Single lumen 20g x 8cm midline placed to right cephalic vein. Max dwell date 7/11/17, Lot# [id number].  Needle advanced into the vessel under real time ultrasound guidance.  Image recorded and saved.

## 2017-06-13 NOTE — SUBJECTIVE & OBJECTIVE
Interval History: >4 elements OR status of 3 inpatient conditions  Patient came because of SZ. R PLEDS and slowing and no ictal activity. MRI of the brain is stable. No acute strokes. Patient had severe tigh pain. Tegretol level 10.3. Serum Na OK. Enterbacter resistant to Ceftriaxine. Antibiotics were changed to Cipro. RLE tenderness.   Dermatology consultation left recommendations.    Review of Systems   HENT: Negative.    Eyes: Negative.    Respiratory: Negative.    Cardiovascular: Negative.    Gastrointestinal: Negative.    Musculoskeletal: Positive for myalgias.   Neurological: Positive for weakness.     2 systems OR Unable to obtain a complete ROS due to level of consciousness.  Objective:     Vitals:  Temp: 99.4 °F (37.4 °C) (06/13/17 1500)  Pulse: 77 (06/13/17 1700)  Resp: (!) 22 (06/13/17 1700)  BP: 116/62 (06/13/17 1500)  SpO2: 100 % (06/13/17 1700)    Temp:  [97.8 °F (36.6 °C)-99.5 °F (37.5 °C)] 99.4 °F (37.4 °C)  Pulse:  [61-80] 77  Resp:  [5-30] 22  SpO2:  [97 %-100 %] 100 %  BP: (111-204)/(56-90) 116/62  Arterial Line BP: ()/(44-92) 171/71              06/12 0701 - 06/13 0700  In: 1750 [I.V.:870]  Out: 810 [Urine:610]    Physical Exam   Neurological: GCS eye subscore is 4 - spontaneous. GCS verbal subscore is 5 - oriented. GCS motor subscore is 6 - obeys commands.     General   HEENT: Prior R crani.  Chest Heart RRR / Lungs Clear to auscultation  Abdomen: Soft nontender + BS  Extremities: OK distal pulses. Significant RLE atrophy.    Skin: Intertrigo.  Neurological Exam:  MS; Awake, following simple commands. Verbal output.  CN: II-XII  L UMN VII.  Motor: LUE   0/5 / RUE  /5  Tone normal bilaterally L- spasticity.             LLE  0 /5 /  RLE  /5  Tone normal bilaterally  RLE pain with movement.  Sensory: LT/PP/T/ Vibration UK                 Complex sensory modalities: not tested  DTR:  normal throughout.  Coordination /Fine motor:   Gait: Not tested.  Meningeal signs: Absent     Unable to test  judgment, insight, coordination, gait due to level of consciousness.        [place] Coma Scale:  Best Eye Response: 4 - spontaneous  Best Motor Response: 6 - obeys commands  Best Verbal Response: 5 - oriented  [place] Coma Scale Total: 15            Medications:  Continuous  sodium chloride 0.9% Last Rate: 50 mL/hr at 06/13/17 1700   Scheduled  albuterol sulfate 2.5 mg Q4H   amlodipine 10 mg Daily   aspirin 81 mg Daily   carbamazepine 400 mg Q8H   ciprofloxacin 400 mg Q12H   famotidine 20 mg Daily   heparin (porcine) 5,000 Units Q8H   hydrALAZINE 100 mg Q8H   labetalol 500 mg TID   levetiracetam IVPB 1,500 mg Q12H   lisinopril 10 mg Daily   miconazole nitrate 2%  BID   polyethylene glycol 17 g Daily   senna-docusate 8.6-50 mg 1 tablet BID   sodium chloride 0.9% 3 mL Q8H   PRN  sodium chloride  Q24H PRN   acetaminophen 650 mg Q6H PRN   dextrose 50% 12.5 g PRN   glucagon (human recombinant) 1 mg PRN   hydrALAZINE 10 mg Q4H PRN   insulin aspart 1-10 Units Q6H PRN   magnesium oxide 800 mg PRN   magnesium oxide 800 mg PRN   potassium chloride 10% 40 mEq PRN   potassium chloride 10% 40 mEq PRN   potassium chloride 10% 60 mEq PRN   potassium, sodium phosphates 2 packet PRN   potassium, sodium phosphates 2 packet PRN   potassium, sodium phosphates 2 packet PRN     Today I personally reviewed pertinent medications, lines/drains/airways, imaging, cardiology, lab results, microbiology results, notably:

## 2017-06-13 NOTE — PROGRESS NOTES
NCC team notified of the patient complaining of R. Leg pain (8/10). NCC team came bedside to assess, acetaminophen 650 mg ordered and ultrasound ordered of the lower extremity ordered. RN ordered to leave SCDs off. RN will continue to monitor.

## 2017-06-13 NOTE — PLAN OF CARE
Problem: Patient Care Overview  Goal: Plan of Care Review  Outcome: Ongoing (interventions implemented as appropriate)  POC reviewed with pt at 0500. Pt unable to verbalize understanding. Questions and concerns addressed with spouse at bedside. No acute events overnight. Pt progressing toward goals. Will continue to monitor. See flowsheets for full assessment and VS info

## 2017-06-13 NOTE — PROGRESS NOTES
MRI negative for acute stroke. Will sign off. Please re consult for any questions or concerns.                Lula Rodriguez PA-C  Vascular Neurology  [phone]

## 2017-06-14 PROBLEM — L30.4 INTERTRIGO: Status: ACTIVE | Noted: 2017-06-14

## 2017-06-14 PROBLEM — K59.00 CONSTIPATION: Status: ACTIVE | Noted: 2017-06-14

## 2017-06-14 LAB
ABO + RH BLD: NORMAL
ALBUMIN SERPL BCP-MCNC: 2.5 G/DL
ALP SERPL-CCNC: 96 U/L
ALT SERPL W/O P-5'-P-CCNC: 7 U/L
ANION GAP SERPL CALC-SCNC: 6 MMOL/L
AST SERPL-CCNC: 11 U/L
BASOPHILS # BLD AUTO: 0.03 K/UL
BASOPHILS NFR BLD: 0.6 %
BILIRUB SERPL-MCNC: 0.2 MG/DL
BLD GP AB SCN CELLS X3 SERPL QL: NORMAL
BLD PROD TYP BPU: NORMAL
BLD PROD TYP BPU: NORMAL
BLOOD UNIT EXPIRATION DATE: NORMAL
BLOOD UNIT EXPIRATION DATE: NORMAL
BLOOD UNIT TYPE CODE: 5100
BLOOD UNIT TYPE CODE: 5100
BLOOD UNIT TYPE: NORMAL
BLOOD UNIT TYPE: NORMAL
BUN SERPL-MCNC: 29 MG/DL
CALCIUM SERPL-MCNC: 8 MG/DL
CHLORIDE SERPL-SCNC: 113 MMOL/L
CO2 SERPL-SCNC: 25 MMOL/L
CODING SYSTEM: NORMAL
CODING SYSTEM: NORMAL
CREAT SERPL-MCNC: 1.4 MG/DL
DIFFERENTIAL METHOD: ABNORMAL
DISPENSE STATUS: NORMAL
DISPENSE STATUS: NORMAL
EOSINOPHIL # BLD AUTO: 0.3 K/UL
EOSINOPHIL NFR BLD: 6.2 %
ERYTHROCYTE [DISTWIDTH] IN BLOOD BY AUTOMATED COUNT: 15.4 %
EST. GFR  (AFRICAN AMERICAN): 47.8 ML/MIN/1.73 M^2
EST. GFR  (NON AFRICAN AMERICAN): 41.4 ML/MIN/1.73 M^2
FERRITIN SERPL-MCNC: 362 NG/ML
GLUCOSE SERPL-MCNC: 105 MG/DL
HAPTOGLOB SERPL-MCNC: 208 MG/DL
HCT VFR BLD AUTO: 21.3 %
HGB BLD-MCNC: 6.7 G/DL
IRON SERPL-MCNC: 42 UG/DL
LYMPHOCYTES # BLD AUTO: 1 K/UL
LYMPHOCYTES NFR BLD: 19.7 %
MAGNESIUM SERPL-MCNC: 1.8 MG/DL
MAGNESIUM SERPL-MCNC: 2 MG/DL
MCH RBC QN AUTO: 29.6 PG
MCHC RBC AUTO-ENTMCNC: 31.5 %
MCV RBC AUTO: 94 FL
MONOCYTES # BLD AUTO: 0.4 K/UL
MONOCYTES NFR BLD: 8.8 %
NEUTROPHILS # BLD AUTO: 3.2 K/UL
NEUTROPHILS NFR BLD: 64.3 %
PHOSPHATE SERPL-MCNC: 2.4 MG/DL
PHOSPHATE SERPL-MCNC: 2.7 MG/DL
PLATELET # BLD AUTO: 202 K/UL
PMV BLD AUTO: 9.7 FL
POCT GLUCOSE: 105 MG/DL (ref 70–110)
POCT GLUCOSE: 116 MG/DL (ref 70–110)
POCT GLUCOSE: 124 MG/DL (ref 70–110)
POCT GLUCOSE: 230 MG/DL (ref 70–110)
POCT GLUCOSE: 80 MG/DL (ref 70–110)
POTASSIUM SERPL-SCNC: 4.2 MMOL/L
PROT SERPL-MCNC: 5.4 G/DL
RBC # BLD AUTO: 2.26 M/UL
RETICS/RBC NFR AUTO: 1.3 %
SATURATED IRON: 40 %
SODIUM SERPL-SCNC: 144 MMOL/L
TOTAL IRON BINDING CAPACITY: 105 UG/DL
TRANS ERYTHROCYTES VOL PATIENT: NORMAL ML
TRANS ERYTHROCYTES VOL PATIENT: NORMAL ML
TRANSFERRIN SERPL-MCNC: 71 MG/DL
TRANSFERRIN SERPL-MCNC: 71 MG/DL
WBC # BLD AUTO: 5.02 K/UL

## 2017-06-14 PROCEDURE — 82728 ASSAY OF FERRITIN: CPT

## 2017-06-14 PROCEDURE — 94640 AIRWAY INHALATION TREATMENT: CPT

## 2017-06-14 PROCEDURE — 99900035 HC TECH TIME PER 15 MIN (STAT)

## 2017-06-14 PROCEDURE — 36430 TRANSFUSION BLD/BLD COMPNT: CPT

## 2017-06-14 PROCEDURE — 25000003 PHARM REV CODE 250: Performed by: PHYSICIAN ASSISTANT

## 2017-06-14 PROCEDURE — 63600175 PHARM REV CODE 636 W HCPCS: Performed by: NURSE PRACTITIONER

## 2017-06-14 PROCEDURE — 94761 N-INVAS EAR/PLS OXIMETRY MLT: CPT

## 2017-06-14 PROCEDURE — 80053 COMPREHEN METABOLIC PANEL: CPT

## 2017-06-14 PROCEDURE — 20000000 HC ICU ROOM

## 2017-06-14 PROCEDURE — 63600175 PHARM REV CODE 636 W HCPCS: Performed by: PHYSICIAN ASSISTANT

## 2017-06-14 PROCEDURE — 92507 TX SP LANG VOICE COMM INDIV: CPT

## 2017-06-14 PROCEDURE — 95951 PR EEG MONITORING/VIDEORECORD: CPT | Mod: 26,,, | Performed by: PSYCHIATRY & NEUROLOGY

## 2017-06-14 PROCEDURE — 86901 BLOOD TYPING SEROLOGIC RH(D): CPT

## 2017-06-14 PROCEDURE — 93005 ELECTROCARDIOGRAM TRACING: CPT

## 2017-06-14 PROCEDURE — 86920 COMPATIBILITY TEST SPIN: CPT

## 2017-06-14 PROCEDURE — 25000003 PHARM REV CODE 250: Performed by: PSYCHIATRY & NEUROLOGY

## 2017-06-14 PROCEDURE — 95951 HC EEG MONITORING/VIDEO RECORD: CPT

## 2017-06-14 PROCEDURE — 84100 ASSAY OF PHOSPHORUS: CPT | Mod: 91

## 2017-06-14 PROCEDURE — 25000003 PHARM REV CODE 250: Performed by: NURSE PRACTITIONER

## 2017-06-14 PROCEDURE — P9021 RED BLOOD CELLS UNIT: HCPCS

## 2017-06-14 PROCEDURE — 86900 BLOOD TYPING SEROLOGIC ABO: CPT

## 2017-06-14 PROCEDURE — 63600175 PHARM REV CODE 636 W HCPCS: Performed by: PSYCHIATRY & NEUROLOGY

## 2017-06-14 PROCEDURE — 83010 ASSAY OF HAPTOGLOBIN QUANT: CPT

## 2017-06-14 PROCEDURE — 83735 ASSAY OF MAGNESIUM: CPT

## 2017-06-14 PROCEDURE — 93010 ELECTROCARDIOGRAM REPORT: CPT | Mod: S$GLB,,, | Performed by: INTERNAL MEDICINE

## 2017-06-14 PROCEDURE — 92526 ORAL FUNCTION THERAPY: CPT

## 2017-06-14 PROCEDURE — 99233 SBSQ HOSP IP/OBS HIGH 50: CPT | Mod: GC,,, | Performed by: PSYCHIATRY & NEUROLOGY

## 2017-06-14 PROCEDURE — 85045 AUTOMATED RETICULOCYTE COUNT: CPT

## 2017-06-14 PROCEDURE — 85025 COMPLETE CBC W/AUTO DIFF WBC: CPT

## 2017-06-14 PROCEDURE — 83540 ASSAY OF IRON: CPT

## 2017-06-14 PROCEDURE — 84100 ASSAY OF PHOSPHORUS: CPT

## 2017-06-14 PROCEDURE — 25000003 PHARM REV CODE 250: Performed by: STUDENT IN AN ORGANIZED HEALTH CARE EDUCATION/TRAINING PROGRAM

## 2017-06-14 PROCEDURE — 25000242 PHARM REV CODE 250 ALT 637 W/ HCPCS: Performed by: PHYSICIAN ASSISTANT

## 2017-06-14 PROCEDURE — 82272 OCCULT BLD FECES 1-3 TESTS: CPT

## 2017-06-14 PROCEDURE — 83735 ASSAY OF MAGNESIUM: CPT | Mod: 91

## 2017-06-14 PROCEDURE — 95957 EEG DIGITAL ANALYSIS: CPT

## 2017-06-14 RX ORDER — HYDROCODONE BITARTRATE AND ACETAMINOPHEN 500; 5 MG/1; MG/1
TABLET ORAL
Status: DISCONTINUED | OUTPATIENT
Start: 2017-06-14 | End: 2017-06-22 | Stop reason: HOSPADM

## 2017-06-14 RX ORDER — LEVETIRACETAM 100 MG/ML
1500 SOLUTION ORAL 2 TIMES DAILY
Status: DISCONTINUED | OUTPATIENT
Start: 2017-06-14 | End: 2017-06-15

## 2017-06-14 RX ORDER — CIPROFLOXACIN 250 MG/1
500 TABLET, FILM COATED ORAL EVERY 12 HOURS
Status: COMPLETED | OUTPATIENT
Start: 2017-06-14 | End: 2017-06-18

## 2017-06-14 RX ORDER — SYRING-NEEDL,DISP,INSUL,0.3 ML 29 G X1/2"
296 SYRINGE, EMPTY DISPOSABLE MISCELLANEOUS ONCE
Status: COMPLETED | OUTPATIENT
Start: 2017-06-14 | End: 2017-06-14

## 2017-06-14 RX ORDER — HYDRALAZINE HYDROCHLORIDE 50 MG/1
50 TABLET, FILM COATED ORAL EVERY 8 HOURS
Status: DISCONTINUED | OUTPATIENT
Start: 2017-06-14 | End: 2017-06-15

## 2017-06-14 RX ORDER — METOCLOPRAMIDE HYDROCHLORIDE 5 MG/ML
10 INJECTION INTRAMUSCULAR; INTRAVENOUS EVERY 6 HOURS
Status: COMPLETED | OUTPATIENT
Start: 2017-06-14 | End: 2017-06-15

## 2017-06-14 RX ADMIN — CIPROFLOXACIN 400 MG: 2 INJECTION, SOLUTION INTRAVENOUS at 06:06

## 2017-06-14 RX ADMIN — MAGESIUM CITRATE 296 ML: 1.75 LIQUID ORAL at 02:06

## 2017-06-14 RX ADMIN — ASPIRIN 81 MG CHEWABLE TABLET 81 MG: 81 TABLET CHEWABLE at 08:06

## 2017-06-14 RX ADMIN — HYDRALAZINE HYDROCHLORIDE 50 MG: 50 TABLET ORAL at 01:06

## 2017-06-14 RX ADMIN — GABAPENTIN 300 MG: 300 CAPSULE ORAL at 09:06

## 2017-06-14 RX ADMIN — CARBAMAZEPINE 400 MG: 100 TABLET, CHEWABLE ORAL at 06:06

## 2017-06-14 RX ADMIN — Medication 800 MG: at 08:06

## 2017-06-14 RX ADMIN — LISINOPRIL 10 MG: 10 TABLET ORAL at 08:06

## 2017-06-14 RX ADMIN — ALBUTEROL SULFATE 2.5 MG: 2.5 SOLUTION RESPIRATORY (INHALATION) at 07:06

## 2017-06-14 RX ADMIN — FAMOTIDINE 20 MG: 20 TABLET, FILM COATED ORAL at 06:06

## 2017-06-14 RX ADMIN — CIPROFLOXACIN HYDROCHLORIDE 500 MG: 500 TABLET, FILM COATED ORAL at 09:06

## 2017-06-14 RX ADMIN — METOCLOPRAMIDE 10 MG: 5 INJECTION, SOLUTION INTRAMUSCULAR; INTRAVENOUS at 05:06

## 2017-06-14 RX ADMIN — ALBUTEROL SULFATE 2.5 MG: 2.5 SOLUTION RESPIRATORY (INHALATION) at 08:06

## 2017-06-14 RX ADMIN — CARBAMAZEPINE 400 MG: 100 TABLET, CHEWABLE ORAL at 09:06

## 2017-06-14 RX ADMIN — Medication 3 ML: at 10:06

## 2017-06-14 RX ADMIN — HYDRALAZINE HYDROCHLORIDE 50 MG: 50 TABLET ORAL at 09:06

## 2017-06-14 RX ADMIN — STANDARDIZED SENNA CONCENTRATE AND DOCUSATE SODIUM 1 TABLET: 8.6; 5 TABLET, FILM COATED ORAL at 09:06

## 2017-06-14 RX ADMIN — CARBAMAZEPINE 400 MG: 100 TABLET, CHEWABLE ORAL at 01:06

## 2017-06-14 RX ADMIN — STANDARDIZED SENNA CONCENTRATE AND DOCUSATE SODIUM 1 TABLET: 8.6; 5 TABLET, FILM COATED ORAL at 08:06

## 2017-06-14 RX ADMIN — ALBUTEROL SULFATE 2.5 MG: 2.5 SOLUTION RESPIRATORY (INHALATION) at 04:06

## 2017-06-14 RX ADMIN — ALBUTEROL SULFATE 2.5 MG: 2.5 SOLUTION RESPIRATORY (INHALATION) at 12:06

## 2017-06-14 RX ADMIN — ALBUTEROL SULFATE 2.5 MG: 2.5 SOLUTION RESPIRATORY (INHALATION) at 11:06

## 2017-06-14 RX ADMIN — MICONAZOLE NITRATE: 20 OINTMENT TOPICAL at 08:06

## 2017-06-14 RX ADMIN — POTASSIUM & SODIUM PHOSPHATES POWDER PACK 280-160-250 MG 2 PACKET: 280-160-250 PACK at 08:06

## 2017-06-14 RX ADMIN — LABETALOL HCL 500 MG: 200 TABLET, FILM COATED ORAL at 06:06

## 2017-06-14 RX ADMIN — Medication 3 ML: at 06:06

## 2017-06-14 RX ADMIN — LABETALOL HCL 500 MG: 200 TABLET, FILM COATED ORAL at 01:06

## 2017-06-14 RX ADMIN — LEVETIRACETAM 1500 MG: 15 INJECTION INTRAVENOUS at 08:06

## 2017-06-14 RX ADMIN — HEPARIN SODIUM 5000 UNITS: 5000 INJECTION, SOLUTION INTRAVENOUS; SUBCUTANEOUS at 09:06

## 2017-06-14 RX ADMIN — INSULIN ASPART 4 UNITS: 100 INJECTION, SOLUTION INTRAVENOUS; SUBCUTANEOUS at 11:06

## 2017-06-14 RX ADMIN — HEPARIN SODIUM 5000 UNITS: 5000 INJECTION, SOLUTION INTRAVENOUS; SUBCUTANEOUS at 01:06

## 2017-06-14 RX ADMIN — HYDRALAZINE HYDROCHLORIDE 100 MG: 50 TABLET ORAL at 06:06

## 2017-06-14 RX ADMIN — Medication 800 MG: at 12:06

## 2017-06-14 RX ADMIN — AMLODIPINE BESYLATE 10 MG: 10 TABLET ORAL at 08:06

## 2017-06-14 RX ADMIN — LABETALOL HCL 500 MG: 200 TABLET, FILM COATED ORAL at 09:06

## 2017-06-14 RX ADMIN — LEVETIRACETAM 1500 MG: 500 SOLUTION ORAL at 09:06

## 2017-06-14 RX ADMIN — Medication 3 ML: at 02:06

## 2017-06-14 RX ADMIN — MICONAZOLE NITRATE: 20 OINTMENT TOPICAL at 09:06

## 2017-06-14 RX ADMIN — POLYETHYLENE GLYCOL 3350 17 G: 17 POWDER, FOR SOLUTION ORAL at 08:06

## 2017-06-14 RX ADMIN — HEPARIN SODIUM 5000 UNITS: 5000 INJECTION, SOLUTION INTRAVENOUS; SUBCUTANEOUS at 06:06

## 2017-06-14 RX ADMIN — POTASSIUM & SODIUM PHOSPHATES POWDER PACK 280-160-250 MG 2 PACKET: 280-160-250 PACK at 12:06

## 2017-06-14 NOTE — PLAN OF CARE
Problem: SLP Goal  Goal: SLP Goal  Speech Language Pathology Goals  Goals expected to be met by 6/20    1. Pt will tolerate dental soft diet/nectar thick liquids without overt s/s of aspiration  2. Pt will tolerate thin liquid trials x10 without overt s/s of aspiration  3. Pt will complete OME x10 reps with min cues  4. Pt will complete recall tasks with 70% accuracy and min cues  5. Pt will follow 3 step commands with 75% accuracy and min cues  6. Pt will complete problem solving tasks with 80% accuracy and min cues       Continue POC at this time, all goals remain appropriate at this time.   Kacy Bennett CCC-SLP  6/14/2017

## 2017-06-14 NOTE — PT/OT/SLP PROGRESS
Speech Language Pathology  Treatment    Lucy Perez   MRN: [id number]   Admitting Diagnosis: Partial symptomatic epilepsy with complex partial seizures, not intractable, without status epilepticus    Diet recommendations: Solid Diet Level: Dental Soft  Liquid Diet Level: Nectar Thick Feed only when awake/alert, HOB to 90 degrees, Small bites/sips, Alternating bites/sips, 1 bite/sip at a time, Assistance with meals and Assistance with thickening liquids    SLP Treatment Date: 06/14/17  Speech Start Time: 0832     Speech Stop Time: 0850     Speech Total (min): 18 min       TREATMENT BILLABLE MINUTES:  Speech Therapy Individual 8 and Treatment Swallowing Dysfunction 10    Has the patient been evaluated by SLP for swallowing? : Yes  Keep patient NPO?: No   General Precautions: Standard, aspiration, fall          Subjective:  Awake/alert    Pain/Comfort  Pain Rating 1: 0/10  Pain Rating Post-Intervention 1: 0/10    Objective:   Pt remains NPO at this time until she has a BM to order diet per NSG. Nectar thick liquids tolerated via cup 2 and thin liquids x2 without overt s/s of aspiration time. Swallow initiation was mildly delayed. Pt completed OME x5 reps with mod-max cues provided. Pt with decreased tone, pursing and retraction noted for labial movement. Convergent categories task completed with 60% accuracy ind'ly increasing to  80% accuracy provided min cues. Pt recalled 3/3 words ind'ly after a 3 minute delay. Continue diet recs of nectar thick liquids/dental soft diet at this time. Swallow precautions reviewed with pt and family.     Assessment:  Lucy Perez is a 56 y.o. female with a medical diagnosis of Partial symptomatic epilepsy with complex partial seizures, not intractable, without status epilepticus and presents with dysphagia, dysarthria and cognitive linguistic impairment.      Goals:    SLP Goals        Problem: SLP Goal    Goal Priority Disciplines Outcome   SLP Goal     SLP     Description:  Speech Language Pathology Goals  Goals expected to be met by 6/20    1. Pt will tolerate dental soft diet/nectar thick liquids without overt s/s of aspiration  2. Pt will tolerate thin liquid trials x10 without overt s/s of aspiration  3. Pt will complete OME x10 reps with min cues  4. Pt will complete recall tasks with 70% accuracy and min cues  5. Pt will follow 3 step commands with 75% accuracy and min cues  6. Pt will complete problem solving tasks with 80% accuracy and min cues                         Plan:   Patient to be seen Therapy Frequency: 5 x/week   Plan of Care expires: 07/12/17  Plan of Care reviewed with: patient, spouse  SLP Follow-up?: Yes              Kacy Bennett CCC-SLP   Speech Language Pathologist  Pager [phone]  06/14/2017

## 2017-06-14 NOTE — PLAN OF CARE
"Neuro Critical Care Transfer of Care note    Date of Admit: 6/6/2017  Date of Transfer / Stepdown: 6/14/2017    Brief History of Present Illness:      Lucy Perez is a 56 y.o. female with a PMHx of CVA in August and November with residual left sided weakness, Type 2 DM, HLD, HTN, sarcoid, RA, CKD 3 presenting with aphasia, unresponsiveness and poor motor effort since 9 AM today. The patient's family reports that the patient has had an elevated BP to the 180s for the past week. They report that at times the patient "will be sleepy" and poorly responsive and usually improves within a few hours. However,  At about 2pm she was found to be staring up the ceiling and remained unresponsive.  The family attempted to feed the patient at lunch through her PEG tube but the patient vomited shortly thereafter. Her baseline is described as dependent for activities of daily living, verbal and interactive with limited mobility due to left hemiparesis.     She had hemicranectomy for stroke treatment in 2016. She is currently on keppra and tegretol and per family was started post stroke for seizure prophylaxis. No history of seizure disorder per patient's daughter.     Hospital Course By Problem with Pertinent Findings:     Neuro:  -Pt admitted to [facility] for seizure activity and continuous monitoring. EEG was not concerning for status epilepticus; however, she demonstrated clinical features of seizure activity with RUE posturing and left gaze deviation.  -AED's were titrated and mental status improved.   -Keppra 1.5 gr q 12hrs.  -Tegretol 400 mg q 8hrs.   -Tegretol level: 10.3, therapeutic  -ASA 81mg qd.  -PT/OT  -MRI Brain negative for acute infarct    Resp:  -Stable on room air    CV:   HTN, bilatrial enlargement and LVDDF, Keep SBP <=160 mmHg,  -ASA 81mg qd.  -Labetalol 500mg q8hrs.  -Amlodipine 10mg qg  -Hydralazine 50 mg q 8hrs  -Lisinopril 10mg qd.   -12 lead ECG.    IVF/nutrition/Renal/GI: No ileus.  -NPO. Thickened " liquids PO diet when he restarts.   -Up BR: senna , colace, senna , myralax. Enema today.   -TF held.  -NS at 50cc/hr IVD.  -Reglan 10mg q 6hrs x 24 hrs.  -Bladder scan.  -Has pure wick.  -BR: Maxitrate.    Hem / ID:   UTI with enterobacter resistant. Anemia..  SC Heparin prophylaxis.  -Anemia panel and haptoglobin  -Transfuse 2 units of PRBC  -Cipro x 7 days for UTI.   -US on RLE.    Endo:  -SSI q 6hrs.  -Detemir 10 U qhs (hold if not on full TF).    Prophylaxis:  SC Heparin prophylaxis.    Derm: Intertrigo due to moisture with yeast noted to patient's left abdominal fold, and multiple areas of shearing noted to patient's buttocks with surrounding tissue with dry flaky skin resembling yeast.   -Clear barrier cream with miconazole to abdominal folds, buttocks, and posterior thighs BID.        Consultants and Procedures:     Consultants:  Vascular Neurology  Epilepsy    Procedures:    EEG    Transfer Information:     Diet:  Tube feeds: diabetasource     Physical Activity:  PT/OT/SLP following    To Do / Pending Studies / Follow ups:      Patrice Duenas MD  [employer] Neurology PGY 2  Neuro Crtical Care

## 2017-06-14 NOTE — PLAN OF CARE
Problem: Patient Care Overview  Goal: Plan of Care Review  Outcome: Ongoing (interventions implemented as appropriate)  POC reviewed with patient and family at 1500. Patient needs education reinforcement. Questions and concerns addressed with spouse. Transfer orders were placed, however, the patient had a seizure at 1500 assessment. NCC team notified, they came bedside to assess and patient was placed on 24 hour EEG monitoring. 2 units RBCs ordered. BP medications are to be held if the patient's SBP is < 120. Patient was given Mg. Citrate and reglan to initiate a BM. No BM has occurred as of now. Patient turned per protocol, electrolytes replaced per protocol, and oral care per protocol. RN will continue to monitor. See flowsheets for full assessment and VS info.

## 2017-06-14 NOTE — PROGRESS NOTES
RN went to do 1500 full assessment. Patient did not respond verbally and had an upward gaze. RN called NCC team. Team is to come bedside to assess. RN will continue to monitor.

## 2017-06-14 NOTE — PLAN OF CARE
SW met with Pt  and family at bedside. Pt was being hooked to 24 hour EEG. Discussed therapy recs for HH. They reported that before the hospital stay, they had [facility]. SW reported to them that this will be set up again when Pt is ready to discharge.    Cindy Ledbetter, NICOLE  Neurocritical Care   [facility]  [phone]

## 2017-06-14 NOTE — PLAN OF CARE
POC reviewed with pt at 0500. Pt verbalized understanding. Questions and concerns addressed with pt and  at bedside. No acute events overnight. Pt progressing toward goals. Will continue to monitor. See flowsheets for full assessment and VS info

## 2017-06-15 LAB
ALBUMIN SERPL BCP-MCNC: 2.8 G/DL
ALP SERPL-CCNC: 103 U/L
ALT SERPL W/O P-5'-P-CCNC: 6 U/L
ANION GAP SERPL CALC-SCNC: 8 MMOL/L
AST SERPL-CCNC: 13 U/L
BASOPHILS # BLD AUTO: 0.02 K/UL
BASOPHILS NFR BLD: 0.2 %
BILIRUB SERPL-MCNC: 0.4 MG/DL
BUN SERPL-MCNC: 28 MG/DL
CALCIUM SERPL-MCNC: 8.2 MG/DL
CARBAMAZEPINE SERPL-MCNC: 10.6 UG/ML
CHLORIDE SERPL-SCNC: 112 MMOL/L
CO2 SERPL-SCNC: 24 MMOL/L
CREAT SERPL-MCNC: 1.4 MG/DL
DIFFERENTIAL METHOD: ABNORMAL
EOSINOPHIL # BLD AUTO: 0.4 K/UL
EOSINOPHIL NFR BLD: 4.9 %
ERYTHROCYTE [DISTWIDTH] IN BLOOD BY AUTOMATED COUNT: 16.7 %
EST. GFR  (AFRICAN AMERICAN): 47.8 ML/MIN/1.73 M^2
EST. GFR  (NON AFRICAN AMERICAN): 41.4 ML/MIN/1.73 M^2
GLUCOSE SERPL-MCNC: 88 MG/DL
HCT VFR BLD AUTO: 29.4 %
HGB BLD-MCNC: 9.7 G/DL
LYMPHOCYTES # BLD AUTO: 1.3 K/UL
LYMPHOCYTES NFR BLD: 15.5 %
MAGNESIUM SERPL-MCNC: 2.1 MG/DL
MCH RBC QN AUTO: 30.1 PG
MCHC RBC AUTO-ENTMCNC: 33 %
MCV RBC AUTO: 91 FL
MONOCYTES # BLD AUTO: 0.7 K/UL
MONOCYTES NFR BLD: 8 %
NEUTROPHILS # BLD AUTO: 5.8 K/UL
NEUTROPHILS NFR BLD: 70.9 %
OB PNL STL: NEGATIVE
PHOSPHATE SERPL-MCNC: 2.9 MG/DL
PLATELET # BLD AUTO: 213 K/UL
PMV BLD AUTO: 9.8 FL
POCT GLUCOSE: 111 MG/DL (ref 70–110)
POCT GLUCOSE: 126 MG/DL (ref 70–110)
POCT GLUCOSE: 172 MG/DL (ref 70–110)
POCT GLUCOSE: 89 MG/DL (ref 70–110)
POCT GLUCOSE: 96 MG/DL (ref 70–110)
POTASSIUM SERPL-SCNC: 4.7 MMOL/L
PROT SERPL-MCNC: 6.2 G/DL
RBC # BLD AUTO: 3.22 M/UL
SODIUM SERPL-SCNC: 144 MMOL/L
WBC # BLD AUTO: 8.21 K/UL

## 2017-06-15 PROCEDURE — 25000003 PHARM REV CODE 250: Performed by: STUDENT IN AN ORGANIZED HEALTH CARE EDUCATION/TRAINING PROGRAM

## 2017-06-15 PROCEDURE — 95951 HC EEG MONITORING/VIDEO RECORD: CPT

## 2017-06-15 PROCEDURE — 83735 ASSAY OF MAGNESIUM: CPT

## 2017-06-15 PROCEDURE — 97803 MED NUTRITION INDIV SUBSEQ: CPT

## 2017-06-15 PROCEDURE — 97110 THERAPEUTIC EXERCISES: CPT

## 2017-06-15 PROCEDURE — 25000003 PHARM REV CODE 250: Performed by: NURSE PRACTITIONER

## 2017-06-15 PROCEDURE — 95951 PR EEG MONITORING/VIDEORECORD: CPT | Mod: 26,,, | Performed by: PSYCHIATRY & NEUROLOGY

## 2017-06-15 PROCEDURE — 63600175 PHARM REV CODE 636 W HCPCS: Performed by: PHYSICIAN ASSISTANT

## 2017-06-15 PROCEDURE — 25000003 PHARM REV CODE 250: Performed by: PSYCHIATRY & NEUROLOGY

## 2017-06-15 PROCEDURE — 85025 COMPLETE CBC W/AUTO DIFF WBC: CPT

## 2017-06-15 PROCEDURE — 63600175 PHARM REV CODE 636 W HCPCS: Performed by: NURSE PRACTITIONER

## 2017-06-15 PROCEDURE — 11000001 HC ACUTE MED/SURG PRIVATE ROOM

## 2017-06-15 PROCEDURE — 25000242 PHARM REV CODE 250 ALT 637 W/ HCPCS: Performed by: PHYSICIAN ASSISTANT

## 2017-06-15 PROCEDURE — 84100 ASSAY OF PHOSPHORUS: CPT

## 2017-06-15 PROCEDURE — 25000003 PHARM REV CODE 250: Performed by: PHYSICIAN ASSISTANT

## 2017-06-15 PROCEDURE — 94640 AIRWAY INHALATION TREATMENT: CPT

## 2017-06-15 PROCEDURE — 80053 COMPREHEN METABOLIC PANEL: CPT

## 2017-06-15 PROCEDURE — 80156 ASSAY CARBAMAZEPINE TOTAL: CPT

## 2017-06-15 PROCEDURE — 95957 EEG DIGITAL ANALYSIS: CPT

## 2017-06-15 PROCEDURE — 92526 ORAL FUNCTION THERAPY: CPT

## 2017-06-15 RX ORDER — LEVETIRACETAM 100 MG/ML
2000 SOLUTION ORAL 2 TIMES DAILY
Status: DISCONTINUED | OUTPATIENT
Start: 2017-06-15 | End: 2017-06-22 | Stop reason: HOSPADM

## 2017-06-15 RX ORDER — PHENYLEPHRINE HCL IN 0.9% NACL 1 MG/10 ML
SYRINGE (ML) INTRAVENOUS
Status: DISCONTINUED
Start: 2017-06-15 | End: 2017-06-15 | Stop reason: WASHOUT

## 2017-06-15 RX ORDER — ROCURONIUM BROMIDE 10 MG/ML
INJECTION, SOLUTION INTRAVENOUS
Status: DISCONTINUED
Start: 2017-06-15 | End: 2017-06-15 | Stop reason: WASHOUT

## 2017-06-15 RX ORDER — ETOMIDATE 2 MG/ML
INJECTION INTRAVENOUS
Status: DISCONTINUED
Start: 2017-06-15 | End: 2017-06-15 | Stop reason: WASHOUT

## 2017-06-15 RX ADMIN — MICONAZOLE NITRATE: 20 OINTMENT TOPICAL at 08:06

## 2017-06-15 RX ADMIN — METOCLOPRAMIDE 10 MG: 5 INJECTION, SOLUTION INTRAMUSCULAR; INTRAVENOUS at 01:06

## 2017-06-15 RX ADMIN — FAMOTIDINE 20 MG: 20 TABLET, FILM COATED ORAL at 05:06

## 2017-06-15 RX ADMIN — STANDARDIZED SENNA CONCENTRATE AND DOCUSATE SODIUM 1 TABLET: 8.6; 5 TABLET, FILM COATED ORAL at 08:06

## 2017-06-15 RX ADMIN — POLYETHYLENE GLYCOL 3350 17 G: 17 POWDER, FOR SOLUTION ORAL at 08:06

## 2017-06-15 RX ADMIN — METOCLOPRAMIDE 10 MG: 5 INJECTION, SOLUTION INTRAMUSCULAR; INTRAVENOUS at 05:06

## 2017-06-15 RX ADMIN — INSULIN ASPART 1 UNITS: 100 INJECTION, SOLUTION INTRAVENOUS; SUBCUTANEOUS at 11:06

## 2017-06-15 RX ADMIN — CARBAMAZEPINE 400 MG: 100 TABLET, CHEWABLE ORAL at 10:06

## 2017-06-15 RX ADMIN — STANDARDIZED SENNA CONCENTRATE AND DOCUSATE SODIUM 1 TABLET: 8.6; 5 TABLET, FILM COATED ORAL at 10:06

## 2017-06-15 RX ADMIN — ALBUTEROL SULFATE 2.5 MG: 2.5 SOLUTION RESPIRATORY (INHALATION) at 11:06

## 2017-06-15 RX ADMIN — CIPROFLOXACIN HYDROCHLORIDE 500 MG: 500 TABLET, FILM COATED ORAL at 08:06

## 2017-06-15 RX ADMIN — HEPARIN SODIUM 5000 UNITS: 5000 INJECTION, SOLUTION INTRAVENOUS; SUBCUTANEOUS at 10:06

## 2017-06-15 RX ADMIN — MICONAZOLE NITRATE: 20 OINTMENT TOPICAL at 10:06

## 2017-06-15 RX ADMIN — HEPARIN SODIUM 5000 UNITS: 5000 INJECTION, SOLUTION INTRAVENOUS; SUBCUTANEOUS at 05:06

## 2017-06-15 RX ADMIN — HYDRALAZINE HYDROCHLORIDE 75 MG: 50 TABLET ORAL at 02:06

## 2017-06-15 RX ADMIN — HYDRALAZINE HYDROCHLORIDE 50 MG: 50 TABLET ORAL at 05:06

## 2017-06-15 RX ADMIN — Medication 3 ML: at 02:06

## 2017-06-15 RX ADMIN — LEVETIRACETAM 1500 MG: 500 SOLUTION ORAL at 08:06

## 2017-06-15 RX ADMIN — ALBUTEROL SULFATE 2.5 MG: 2.5 SOLUTION RESPIRATORY (INHALATION) at 12:06

## 2017-06-15 RX ADMIN — LEVETIRACETAM 2000 MG: 100 SOLUTION ORAL at 10:06

## 2017-06-15 RX ADMIN — HYDRALAZINE HYDROCHLORIDE 10 MG: 20 INJECTION INTRAMUSCULAR; INTRAVENOUS at 09:06

## 2017-06-15 RX ADMIN — CARBAMAZEPINE 400 MG: 100 TABLET, CHEWABLE ORAL at 02:06

## 2017-06-15 RX ADMIN — SODIUM CHLORIDE: 0.9 INJECTION, SOLUTION INTRAVENOUS at 12:06

## 2017-06-15 RX ADMIN — HYDRALAZINE HYDROCHLORIDE 75 MG: 50 TABLET ORAL at 10:06

## 2017-06-15 RX ADMIN — AMLODIPINE BESYLATE 10 MG: 10 TABLET ORAL at 08:06

## 2017-06-15 RX ADMIN — LABETALOL HCL 500 MG: 200 TABLET, FILM COATED ORAL at 05:06

## 2017-06-15 RX ADMIN — HEPARIN SODIUM 5000 UNITS: 5000 INJECTION, SOLUTION INTRAVENOUS; SUBCUTANEOUS at 02:06

## 2017-06-15 RX ADMIN — ALBUTEROL SULFATE 2.5 MG: 2.5 SOLUTION RESPIRATORY (INHALATION) at 04:06

## 2017-06-15 RX ADMIN — LISINOPRIL 10 MG: 10 TABLET ORAL at 08:06

## 2017-06-15 RX ADMIN — SODIUM CHLORIDE: 0.9 INJECTION, SOLUTION INTRAVENOUS at 11:06

## 2017-06-15 RX ADMIN — LABETALOL HCL 500 MG: 200 TABLET, FILM COATED ORAL at 02:06

## 2017-06-15 RX ADMIN — CIPROFLOXACIN HYDROCHLORIDE 500 MG: 500 TABLET, FILM COATED ORAL at 10:06

## 2017-06-15 RX ADMIN — GABAPENTIN 300 MG: 300 CAPSULE ORAL at 10:06

## 2017-06-15 RX ADMIN — ASPIRIN 81 MG CHEWABLE TABLET 81 MG: 81 TABLET CHEWABLE at 08:06

## 2017-06-15 RX ADMIN — ALBUTEROL SULFATE 2.5 MG: 2.5 SOLUTION RESPIRATORY (INHALATION) at 08:06

## 2017-06-15 RX ADMIN — INSULIN ASPART 2 UNITS: 100 INJECTION, SOLUTION INTRAVENOUS; SUBCUTANEOUS at 05:06

## 2017-06-15 RX ADMIN — HYDRALAZINE HYDROCHLORIDE 10 MG: 20 INJECTION INTRAMUSCULAR; INTRAVENOUS at 02:06

## 2017-06-15 RX ADMIN — CARBAMAZEPINE 400 MG: 100 TABLET, CHEWABLE ORAL at 05:06

## 2017-06-15 NOTE — PLAN OF CARE
Problem: Physical Therapy Goal  Goal: Physical Therapy Goal  Goals to be met by: 7 days ()    Patient will increase functional independence with mobility by performin. Rolling to Left with Moderate Assistance.  2. Lower extremity exercise program x10 reps per handout, with assistance as needed  3. Family will report performing PROM x 20 reps to (B) LE and (B) UE.       Outcome: Ongoing (interventions implemented as appropriate)  Patient goals appropriate at this time.    Feliz Parrish III, DPT  6/15/2017

## 2017-06-15 NOTE — PT/OT/SLP PROGRESS
"Speech Language Pathology  Treatment    Lucy Perez   MRN: [id number]   Admitting Diagnosis: Partial symptomatic epilepsy with complex partial seizures, not intractable, without status epilepticus    Diet recommendations: Solid Diet Level: Puree  Liquid Diet Level: Nectar Thick Feed only when awake/alert, No straws, HOB to 90 degrees, Small bites/sips and Assistance with thickening liquids    SLP Treatment Date: 06/15/17  Speech Start Time: 0855     Speech Stop Time: 0910     Speech Total (min): 15 min       TREATMENT BILLABLE MINUTES:  Treatment Swallowing Dysfunction 15    Has the patient been evaluated by SLP for swallowing? : Yes  Keep patient NPO?: No   General Precautions: Standard, fall, aspiration          Subjective:  " my daughter" when asked who was in her room    Pain/Comfort  Pain Rating 1: 0/10  Pain Rating Post-Intervention 1: 0/10    Objective:    Nursing reported pt had a bowel movement last night and speech cleared to assess swallowing. Pt very lethargic with cues to open her eyes and respond. When pt did respond, minimal oral movement noted with low volume. Pt given nectar thick liquids via a teaspoon and allowed to sip from a cup. She was also given teaspoon of pureed bolus x3. Swallowing response varied. Pt with delayed swallow response and reduced hyolaryngeal elevation. No overt coughing or choking noted. Pt did not consistently voice following swallows.  At end of session, as pt became more tired, pt with increased holding and more cues needed to initiate a swallow response. Discussed with pt, family and nursing that pt at risk for aspiration due to her lethargy. Pt should be fed only if she can maintain alertness with strict aspiration precautions. Downgrade diet to pureed at this time due to lethargy. Unable to initiate cognitive tx due to lethargy. Daughter reported increased lethargy since yesterday                                     Assessment:  Lucy Perez is a " 56 y.o. female with a medical diagnosis of Partial symptomatic epilepsy with complex partial seizures, not intractable, without status epilepticus and presents with oral dysphagia and cognitive impairment. Session limited by lethargy    Discharge recommendations: Discharge Facility/Level Of Care Needs: home health speech therapy     Goals:    SLP Goals        Problem: SLP Goal    Goal Priority Disciplines Outcome   SLP Goal     SLP    Description:  Speech Language Pathology Goals  Goals expected to be met by 6/20    1. Pt will tolerate dental soft diet/nectar thick liquids without overt s/s of aspiration  2. Pt will tolerate thin liquid trials x10 without overt s/s of aspiration  3. Pt will complete OME x10 reps with min cues  4. Pt will complete recall tasks with 70% accuracy and min cues  5. Pt will follow 3 step commands with 75% accuracy and min cues  6. Pt will complete problem solving tasks with 80% accuracy and min cues                         Plan:   Patient to be seen Therapy Frequency: 5 x/week   Plan of Care expires: 07/12/17  Plan of Care reviewed with: patient, family  SLP Follow-up?: Yes              MAURICE Rosario, CCC-SLP  06/15/2017

## 2017-06-15 NOTE — PLAN OF CARE
Problem: SLP Goal  Goal: SLP Goal  Speech Language Pathology Goals  Goals expected to be met by 6/20    1. Pt will tolerate dental soft diet/nectar thick liquids without overt s/s of aspiration  2. Pt will tolerate thin liquid trials x10 without overt s/s of aspiration  3. Pt will complete OME x10 reps with min cues  4. Pt will complete recall tasks with 70% accuracy and min cues  5. Pt will follow 3 step commands with 75% accuracy and min cues  6. Pt will complete problem solving tasks with 80% accuracy and min cues        Pt lethargic with cues needed to stay awake. If MD wants to restart diet, recommend pureed diet with nectar thick liquids. Strict aspiration precautions. Feed only when fully awake/alert with supervision.     MAURICE Rosario, CCC-SLP  6/15/2017

## 2017-06-15 NOTE — PROGRESS NOTES
During shift change handoff, patient was nonverbal and gazing upwards. RN called NCC team. Team came to bedside to assess and read EEG. No new orders at this time. RN will continue to monitor.

## 2017-06-15 NOTE — PROGRESS NOTES
[facility]  Neurocritical Care  Progress Note    Admit Date: 6/6/2017  Service Date: 06/15/2017  Length of Stay: 9    Subjective:     Chief Complaint: Partial symptomatic epilepsy with complex partial seizures, not intractable, without status epilepticus    History of Present Illness: 56 y woman who presents with aphasia, unresponsiveness. Past R MCA stroke post hemicraniectomy, possibly complicated by Epilepsy, CHF, DM2, HTN, HLD, sarcoid, RA, CKD.    Hospital Course: 6/7 admission to [facility]  6/8 R PLED-> increased carbamazepine, levetiracetam  6/9 waxing/waning R PLED but no seizure  6/12: Patient came because of SZ. R PLEDS and slowing and no ictal activity.  6/13: MRI of the brain is stable. No acute strokes. Patient had severe tigh pain. Tegretol level 10.3. Serum Na OK. Enterbacter resistant to Ceftriaxine. Antibiotics were changed to Cipro. RLE tenderness. Dermatology consultation left recommendations.  6/14:No events. On Cipro for enterobacter UTI.  BP low during the night. No BM yet,    Interval History: >4 elements OR status of 3 inpatient conditions  No events. On Cipro for enterobacter UTI.  BP low during the night. No BM yet,  Review of Systems   Constitutional: Negative.    HENT: Negative.    Eyes: Negative.    Respiratory: Negative.    Cardiovascular: Negative.    Gastrointestinal: Positive for constipation.     2 systems OR Unable to obtain a complete ROS due to level of consciousness.  Objective:     Vitals:  Temp: 98.6 °F (37 °C) (06/14/17 2300)  Pulse: 62 (06/15/17 0100)  Resp: (!) 25 (06/15/17 0100)  BP: (!) 152/68 (06/15/17 0100)  SpO2: 97 % (06/15/17 0100)    Temp:  [97.7 °F (36.5 °C)-99.4 °F (37.4 °C)] 98.6 °F (37 °C)  Pulse:  [62-79] 62  Resp:  [11-26] 25  SpO2:  [92 %-100 %] 97 %  BP: ()/(44-76) 152/68  Arterial Line BP: (103-190)/(43-87) 158/64              06/14 0701 - 06/15 0700  In: 2402.5 [I.V.:1040]  Out: 400 [Urine:400]    Physical Exam   Neurological: GCS eye  subscore is 4 - spontaneous. GCS verbal subscore is 5 - oriented. GCS motor subscore is 6 - obeys commands.     General   HEENT: Prior R crani.  Chest Heart RRR / Lungs Clear to auscultation  Abdomen: Soft nontender + BS  Extremities: OK distal pulses. Significant RLE atrophy.    Skin: UK  Neurological Exam:  MS; Awake, following simple commands. Verbal output.  CN: II-XII  L UMN VII.  Motor: LUE   0/5 / RUE 5 /5  Tone normal bilaterally L- spasticity.             LLE  0 /5 /  RLE 5 /5  Tone normal bilaterally  RLE pain with movement.  Sensory: LT/PP/T/ Vibration                  Complex sensory modalities: not tested  DTR:  normal throughout.  Coordination /Fine motor:   Gait: Not tested.  Meningeal signs: Absent     Unable to test gait due to level of consciousness.        Raul Coma Scale:  Best Eye Response: 4 - spontaneous  Best Motor Response: 6 - obeys commands  Best Verbal Response: 5 - oriented  Raul Coma Scale Total: 15            Medications:  Continuous  sodium chloride 0.9% Last Rate: 50 mL/hr at 06/15/17 0100   Scheduled  albuterol sulfate 2.5 mg Q4H   amlodipine 10 mg Daily   aspirin 81 mg Daily   carbamazepine 400 mg Q8H   ciprofloxacin HCl 500 mg Q12H   famotidine 20 mg Daily   gabapentin 300 mg QHS   heparin (porcine) 5,000 Units Q8H   hydrALAZINE 50 mg Q8H   labetalol 500 mg TID   levetiracetam oral soln 1,500 mg BID   lisinopril 10 mg Daily   metoclopramide HCl 10 mg Q6H   miconazole nitrate 2%  BID   polyethylene glycol 17 g Daily   senna-docusate 8.6-50 mg 1 tablet BID   sodium chloride 0.9% 3 mL Q8H   PRN  sodium chloride  Q24H PRN   sodium chloride  Q24H PRN   acetaminophen 650 mg Q6H PRN   dextrose 50% 12.5 g PRN   glucagon (human recombinant) 1 mg PRN   hydrALAZINE 10 mg Q4H PRN   insulin aspart 1-10 Units Q6H PRN   magnesium oxide 800 mg PRN   magnesium oxide 800 mg PRN   potassium chloride 10% 40 mEq PRN   potassium chloride 10% 40 mEq PRN   potassium chloride 10% 60 mEq PRN    potassium, sodium phosphates 2 packet PRN   potassium, sodium phosphates 2 packet PRN   potassium, sodium phosphates 2 packet PRN     Today I personally reviewed pertinent medications, lines/drains/airways, imaging, cardiology, lab results, microbiology results, notably:     Assessment/Plan:     Neuro   * Partial symptomatic epilepsy with complex partial seizures, not intractable, without status epilepticus    -condition is improving  -remove cEEG  -levetiracetam  -carbamazepine        Cardiac   Essential hypertension    -hydralazine increase  -amlodipine, labetalol, lisinopril        Renal   Type 2 diabetes mellitus with diabetic chronic kidney disease    -monitor I/O  -detemir, ISS        Other   Anemia of chronic disease    -HGB >7        Raul coma scale total score 9-12    -postictal >ictal  -see epilepsy            Active Problem List:   1.Partial Sz activity.  2. R-MCA stroke with R-putaminal hemorrhagic transformation.  3. UTI with enterobacter.   4. R-hypertensive bleeding on the R-putamen. S/P R decompressive hemicrani.  5. Intertrigo      Assessment / Plan:     Neuro:  -Keppra 1.5 gr q 12hrs.  -Tegretol 400 mg q 8hrs.   -ECASA 81mg qd.  -Tegretol trough in AM.  -PT/OT  Resp:  -RA  -IS  CV: HTN, bilatrial enlargement and LVDDF, Keep SBP <=160 mmHg,  -ECASA 81mg qd.  -Labetalol 500mg q8hrs.  -Amlodipine 10mg qg  -Hydralazine 50 mg q 8hrs decrease  -Lisinopril 10mg qd.   -12 lead ECG.  IVF/nutrition/Renal/GI: No ileus.  -NPO. Thickened liquids PO diet when he restarts.   -Up BR: senna , colace, senna , myralax. Enema today.   -TF held.  -NS at 50cc/hr IVD.  -Reglan 10mg q 6hrs x 24 hrs.  -Bladder scan.  -Has pure wick.  -BR: Maxitrate.  Hem / ID: UTI with enterobacter resistant. Anemia..  SC Heparin prophylaxis.  -Anemia panel  -Transfuse 2 units of PRBC  -Cipro x 7 days for UTI.   -US on RLE.  Endo:  -SSI q 6hrs.  -Detemir 10 U qhs (hold if not on full TF).  Prophylaxis:  SC Heparin prophylaxis.  Derm:  Intertrigo due to moisture with yeast noted to patient's left abdominal fold, and multiple areas of shearing noted to patient's buttocks with surrounding tissue with dry flaky skin resembling yeast.   -Clear barrier cream with miconazole to abdominal folds, buttocks, and posterior thighs BID.   Advance Directives and Disposition:    Full Code. Transfer to medicine and followed by Neurology team / epilepsy.            Uninterrupted level 3  Follow-up /Counseling Time (not including procedures):  = 35 min    Activity Orders          Activity as tolerated starting at 06/07 1158        Full Code    Fritz Valdez MD  Neurocritical Care  [facility]

## 2017-06-15 NOTE — SUBJECTIVE & OBJECTIVE
Interval History:  Short episodes of decreased responsiveness no correlation on EEG     Review of Systems    Review of symptoms  Constitutional: Denies fevers or chills.  Pulmonary: Denies shortness of breath or cough.  Cardiology: Denies chest pain or palpitations.  GI: Denies abdominal pain or constipation.  Neurologic: Denies new weakness,  headache, or paresthesias.  Objective:     Vitals:  Temp: 98.1 °F (36.7 °C) (06/15/17 1106)  Pulse: 63 (06/15/17 1645)  Resp: 19 (06/15/17 1645)  BP: (!) 112/53 (06/15/17 1600)  SpO2: 98 % (06/15/17 1645)    Temp:  [97.7 °F (36.5 °C)-98.6 °F (37 °C)] 98.1 °F (36.7 °C)  Pulse:  [62-77] 63  Resp:  [12-25] 19  SpO2:  [92 %-100 %] 98 %  BP: (112-177)/(53-81) 112/53  Arterial Line BP: (116-177)/(47-87) 126/55              06/14 0701 - 06/15 0700  In: 2677.5 [I.V.:1315]  Out: 600 [Urine:600]    Physical Exam       Physical Exam:  GA: Alert, comfortable, no acute distress.   HEENT: No scleral icterus or JVD.   Pulmonary: Clear to auscultation A/P/L. No wheezing, crackles, or rhonchi.  Cardiac: RRR S1 & S2 w/o rubs/murmurs/gallops.   Abdominal: Bowel sounds present x 4. No appreciable hepatosplenomegaly.  Skin: No jaundice, rashes, or visible lesions.  Neuro:  --GCS: E4 V5 M6  --Mental Status:  Alert responds to verbal stimuli   --CN II-XII grossly intact.   --Pupils 3mm, PERRL.   --Corneal reflex, gag, cough intact.  --LUE strength: 0/5  --RUE strength: 5/5  --LLE strength: 0/5  --RLE strength: 4/5    Unable to test gait due to level of consciousness.    Medications:  Continuous  sodium chloride 0.9% Last Rate: 50 mL/hr at 06/15/17 1500   Scheduled  albuterol sulfate 2.5 mg Q4H   amlodipine 10 mg Daily   aspirin 81 mg Daily   carbamazepine 400 mg Q8H   ciprofloxacin HCl 500 mg Q12H   famotidine 20 mg Daily   gabapentin 300 mg QHS   heparin (porcine) 5,000 Units Q8H   hydrALAZINE 75 mg Q8H   labetalol 500 mg TID   levetiracetam oral soln 2,000 mg BID   lisinopril 10 mg Daily    miconazole nitrate 2%  BID   polyethylene glycol 17 g Daily   senna-docusate 8.6-50 mg 1 tablet BID   sodium chloride 0.9% 3 mL Q8H   PRN  sodium chloride  Q24H PRN   sodium chloride  Q24H PRN   acetaminophen 650 mg Q6H PRN   dextrose 50% 12.5 g PRN   glucagon (human recombinant) 1 mg PRN   hydrALAZINE 10 mg Q4H PRN   insulin aspart 1-10 Units Q6H PRN   magnesium oxide 800 mg PRN   magnesium oxide 800 mg PRN   potassium chloride 10% 40 mEq PRN   potassium chloride 10% 40 mEq PRN   potassium chloride 10% 60 mEq PRN   potassium, sodium phosphates 2 packet PRN   potassium, sodium phosphates 2 packet PRN   potassium, sodium phosphates 2 packet PRN     Today I personally reviewed pertinent medications, lines/drains/airways, imaging, lab results,

## 2017-06-15 NOTE — SUBJECTIVE & OBJECTIVE
Interval History: >4 elements OR status of 3 inpatient conditions  No events. On Cipro for enterobacter UTI.  BP low during the night. No BM yet,  Review of Systems   Constitutional: Negative.    HENT: Negative.    Eyes: Negative.    Respiratory: Negative.    Cardiovascular: Negative.    Gastrointestinal: Positive for constipation.     2 systems OR Unable to obtain a complete ROS due to level of consciousness.  Objective:     Vitals:  Temp: 98.6 °F (37 °C) (06/14/17 2300)  Pulse: 62 (06/15/17 0100)  Resp: (!) 25 (06/15/17 0100)  BP: (!) 152/68 (06/15/17 0100)  SpO2: 97 % (06/15/17 0100)    Temp:  [97.7 °F (36.5 °C)-99.4 °F (37.4 °C)] 98.6 °F (37 °C)  Pulse:  [62-79] 62  Resp:  [11-26] 25  SpO2:  [92 %-100 %] 97 %  BP: ()/(44-76) 152/68  Arterial Line BP: (103-190)/(43-87) 158/64              06/14 0701 - 06/15 0700  In: 2402.5 [I.V.:1040]  Out: 400 [Urine:400]    Physical Exam   Neurological: GCS eye subscore is 4 - spontaneous. GCS verbal subscore is 5 - oriented. GCS motor subscore is 6 - obeys commands.     General   HEENT: Prior R crani.  Chest Heart RRR / Lungs Clear to auscultation  Abdomen: Soft nontender + BS  Extremities: OK distal pulses. Significant RLE atrophy.    Skin: UK  Neurological Exam:  MS; Awake, following simple commands. Verbal output.  CN: II-XII  L UMN VII.  Motor: LUE   0/5 / RUE 5 /5  Tone normal bilaterally L- spasticity.             LLE  0 /5 /  RLE 5 /5  Tone normal bilaterally  RLE pain with movement.  Sensory: LT/PP/T/ Vibration UK                 Complex sensory modalities: not tested  DTR:  normal throughout.  Coordination /Fine motor: UK  Gait: Not tested.  Meningeal signs: Absent     Unable to test gait due to level of consciousness.        Monroe Coma Scale:  Best Eye Response: 4 - spontaneous  Best Motor Response: 6 - obeys commands  Best Verbal Response: 5 - oriented  Raul Coma Scale Total: 15            Medications:  Continuous  sodium chloride 0.9% Last Rate: 50  mL/hr at 06/15/17 0100   Scheduled  albuterol sulfate 2.5 mg Q4H   amlodipine 10 mg Daily   aspirin 81 mg Daily   carbamazepine 400 mg Q8H   ciprofloxacin HCl 500 mg Q12H   famotidine 20 mg Daily   gabapentin 300 mg QHS   heparin (porcine) 5,000 Units Q8H   hydrALAZINE 50 mg Q8H   labetalol 500 mg TID   levetiracetam oral soln 1,500 mg BID   lisinopril 10 mg Daily   metoclopramide HCl 10 mg Q6H   miconazole nitrate 2%  BID   polyethylene glycol 17 g Daily   senna-docusate 8.6-50 mg 1 tablet BID   sodium chloride 0.9% 3 mL Q8H   PRN  sodium chloride  Q24H PRN   sodium chloride  Q24H PRN   acetaminophen 650 mg Q6H PRN   dextrose 50% 12.5 g PRN   glucagon (human recombinant) 1 mg PRN   hydrALAZINE 10 mg Q4H PRN   insulin aspart 1-10 Units Q6H PRN   magnesium oxide 800 mg PRN   magnesium oxide 800 mg PRN   potassium chloride 10% 40 mEq PRN   potassium chloride 10% 40 mEq PRN   potassium chloride 10% 60 mEq PRN   potassium, sodium phosphates 2 packet PRN   potassium, sodium phosphates 2 packet PRN   potassium, sodium phosphates 2 packet PRN     Today I personally reviewed pertinent medications, lines/drains/airways, imaging, cardiology, lab results, microbiology results, notably:

## 2017-06-15 NOTE — ASSESSMENT & PLAN NOTE
-condition is improving  -EEG x 24 hrs   -levetiracetam increased to 2G BID   -carbamazepine 400 q 8

## 2017-06-15 NOTE — PT/OT/SLP PROGRESS
"Physical Therapy  Treatment    Lucy Perez   MRN: [id number]   Admitting Diagnosis: Partial symptomatic epilepsy with complex partial seizures, not intractable, without status epilepticus    PT Received On: 06/15/17  PT Start Time: 1040     PT Stop Time: 1053    PT Total Time (min): 13 min       Billable Minutes:  Therapeutic Exercise 13    Treatment Type: Treatment  PT/PTA: PT             General Precautions: Standard, aspiration, fall  Orthopedic Precautions: N/A   Braces: N/A         Subjective:  Communicated with RN prior to session.  Patient with decreased alertness throughout session. Patient  present and agreeable to PT session.    Pain/Comfort  Pain Rating 1: 0/10  Pain Rating Post-Intervention 1: 0/10    Objective:   Patient found with: arterial line, blood pressure cuff, bright catheter, pressure relief boots, pulse ox (continuous), SCD, telemetry, peripheral IV    Functional Mobility:  Bed Mobility:   Rolling/Turning to Left:  (Did not occur)  Scooting/Bridging:  (Did not occur)  Supine to Sit:  (Did not occur)    Transfers:  Sit <> Stand Assistance:  (Does not occur)    Gait:   Gait Distance:  (Does not occur)      Balance:   Static Sit: Did not  occur  Dynamic Sit: Did not occur  Static Stand: Did not occur  Dynamic stand: Did not occur     Therapeutic Activities and Exercises:  Patient/ educated on role of PT/POC. PROM performed x 15 to BLE/BUE.  declined further education on PROM stating, "daughter  Performed" referring to supine PROM of patient. White board updated: safe to transfer with therapy only.    AM-PAC 6 CLICK MOBILITY  How much help from another person does this patient currently need?   1 = Unable, Total/Dependent Assistance  2 = A lot, Maximum/Moderate Assistance  3 = A little, Minimum/Contact Guard/Supervision  4 = None, Modified [place]/Independent    Turning over in bed (including adjusting bedclothes, sheets and blankets)?: 2  Sitting down on and " standing up from a chair with arms (e.g., wheelchair, bedside commode, etc.): 1  Moving from lying on back to sitting on the side of the bed?: 1  Moving to and from a bed to a chair (including a wheelchair)?: 1  Need to walk in hospital room?: 1  Climbing 3-5 steps with a railing?: 1  Total Score: 7    AM-PAC Raw Score CMS G-Code Modifier Level of Impairment Assistance   6 % Total / Unable   7 - 9 CM 80 - 100% Maximal Assist   10 - 14 CL 60 - 80% Moderate Assist   15 - 19 CK 40 - 60% Moderate Assist   20 - 22 CJ 20 - 40% Minimal Assist   23 CI 1-20% SBA / CGA   24 CH 0% Independent/ Mod I     Patient left supine with all lines intact, call button in reach, bed alarm on and RN notified.    Assessment:  Lucy Perez is a 56 y.o. female with a medical diagnosis of Partial symptomatic epilepsy with complex partial seizures, not intractable, without status epilepticus and presents with weakness and increased  LUE and LLE tone making passive range of motion difficult. Patient with minimal arousal/alertness this session preventing EOB sitting.  declined further education on PROM. Patient to benefit from continued skilled acute intervention prior to return home with  Physical Therapy.    Rehab identified problem list/impairments: Rehab identified problem list/impairments: weakness, impaired endurance, impaired sensation, impaired self care skills, gait instability, impaired functional mobilty, impaired balance, impaired cognition, decreased coordination, decreased upper extremity function, decreased lower extremity function, impaired joint extensibility, impaired fine motor, impaired coordination, abnormal tone, decreased ROM, impaired cardiopulmonary response to activity    Rehab potential is fair.    Activity tolerance: Poor    Discharge recommendations:    Physical Therapy    Barriers to discharge:  None    Equipment recommendations: Equipment Needed After Discharge: none     GOALS:     Physical Therapy Goals        Problem: Physical Therapy Goal    Goal Priority Disciplines Outcome Goal Variances Interventions   Physical Therapy Goal     PT/OT, PT Ongoing (interventions implemented as appropriate)     Description:  Goals to be met by: 7 days ()    Patient will increase functional independence with mobility by performin. Rolling to Left with Moderate Assistance.  2. Lower extremity exercise program x10 reps per handout, with assistance as needed  3. Family will report performing PROM x 20 reps to (B) LE and (B) UE.                        PLAN:    Patient to be seen 3 x/week  to address the above listed problems via therapeutic activities, therapeutic exercises  Plan of Care expires: 17  Plan of Care reviewed with: spouse, patient         Feliz Parrish III, PT  06/15/2017

## 2017-06-15 NOTE — RESIDENT HANDOFF
Neuro Critical Care Transfer of Care note    Date of Admit: 6/6/2017  Date of Transfer / Stepdown: 6/15/2017    Brief History of Present Illness:      Lucy Perez is a 56 y.o. female who  has a past medical history of Acute hypoxemic respiratory failure; Acute on chronic diastolic heart failure (11/7/2016); Anemia of chronic disease (6/10/2017); Anxiety; Asthma; Bipolar disorder; Bronchitis, acute; Cataract; CKD (chronic kidney disease) stage 3, GFR 30-59 ml/min; Depression; Diabetes with neurologic complications; General anesthetics causing adverse effect in therapeutic use; Hemorrhagic cerebrovascular accident (CVA); Hyperlipidemia; Hypertension; Obesity; Partial symptomatic epilepsy with complex partial seizures, not intractable, without status epilepticus; Peripheral neuropathy; Pneumonia; Rheumatoid arthritis; Sarcoidosis; Sleep apnea; and Type II or unspecified type diabetes mellitus with renal manifestations, uncontrolled.. The patient presented to [facility] on 6/6/2017 with a primary complaint of Aphasia (Aphasia, noticed by family at 9 this morning, last seen normal last night. )      She had hemicranectomy for stroke treatment in 2016. She is currently on keppra and tegretol and per family was started post stroke for seizure prophylaxis. No history of seizure disorder per patient's daughter. Was going to be transferred to medicine but had short episodes of upward gaze and decreased response. No tonic clonic no seizures on EEG.     Hospital Course By Problem with Pertinent Findings:     Assessment / Plan:     Neuro: Possible partial Sz. No EEG correlation  -Keppra 2 gr q 12hrs. Add 500mg more.  -Tegretol 400 mg q 8hrs.   -ECASA 81mg qd.  -PT/OT  Resp:  -RA  -IS  CV: HTN, bilatrial enlargement and LVDDF, Keep SBP <=160 mmHg,  -ECASA 81mg qd.  -Labetalol 500mg q8hrs.  -Amlodipine 10mg qg  -Hydralazine 75 mg q 8hrs iincrease   -Lisinopril 10mg qd.   -12 lead  ECG.  IVF/nutrition/Renal/GI: No ileus.  -PO: Thickened liquids PO diet when  restarted.   -Up BR: senna , colace, senna , myralax. Enema today.   -NS at 50cc/hr IVD.  -Reglan D/C.  -Bladder scan.  -Has pure wick.  -BR: Maxitrate.  Hem / ID: UTI with enterobacter resistant. Anemia..  SC Heparin prophylaxis.  -Anemia panel  -Cipro x 7 days for UTI.   -US on RLE.  Endo:  -SSI q 6hrs.  -Detemir 10 U qhs (hold if not on full TF).  Prophylaxis:  SC Heparin prophylaxis.  Derm: Intertrigo due to moisture with yeast noted to patient's left abdominal fold, and multiple areas of shearing noted to patient's buttocks with surrounding tissue with dry flaky skin resembling yeast.   -Clear barrier cream with miconazole to abdominal folds, buttocks, and posterior thighs BID.   Advance Directives and Disposition:    Full Code. Transfer to medicine and followed by Neurology team / epilepsy.       Consultants and Procedures:     Consultants:  Vascular Neurology  Epilepsy    Procedures:    EEG    Transfer Information:     Diet:  Dental soft with thickened liquids     Physical Activity:  PT/OT/SLP following    To Do / Pending Studies / Follow ups:  EEG continuous x 24 hrs currently showing no seizures    Floyd Bermudez  Neuro Crtical Care

## 2017-06-15 NOTE — PROGRESS NOTES
[facility] [address]  Adult Nutrition  Consult Note    SUMMARY     Recommendations    Recommendation/Intervention:   1. If unable to advance diet, recommend Diabetisource @ goal rate 55mL/hr.   -Hold for residuals >500ml.     2. If able to advance diet, recommend 1800kcal Diabetic diet with texture per SLP recommendations.     3. If po intake suboptimal, consider nocturnal feeds of Diabetisource from 8p-6a @ goal rate 55mL/hr to provide 900kcals and 50g protein.     RD to monitor.      Goals: Meet >75% EEN  Nutrition Goal Status: goal met  Communication of RD Recs: reviewed with RN    Reason for Assessment    Reason for Assessment: RD follow-up  Diagnosis: other (see comments) (Seizure disorder)  Relevent Medical History: DM, HTN, HLD, PEG placed   Interdisciplinary Rounds: did not attend     General Information Comments: Pt nonverbal. Per SLP, dental soft and nectar thick however pt remains NPO. Pt has PEG in place. TF ordered however not running at this time.    Nutrition Discharge Planning: Tolerance of TF.    Nutrition Prescription Ordered    Current Diet Order: NPO     Current Nutrition Support Formula Ordered: Diabetisource  Current Nutrition Support Rate Ordered: 50 (ml)  Current Nutrition Support Frequency Ordered: ml/hr        Evaluation of Received Nutrients/Fluid Intake    Enteral Calories (kcal): 1440  Enteral Protein (gm): 72  Enteral (Free Water) Fluid (mL): 982      Energy Calories Required: not meeting needs  % Kcal Needs: 82     Protein Required: meeting needs  % Protein Needs: 87     IV Fluid (mL): 250         Fluid Required:  (per MD)  Comments: +I/O, + BM        Nutrition Risk Screen     Nutrition Risk Screen: tube feeding or parenteral nutrition    Nutrition/Diet History    Patient Reported Diet/Restrictions/Preferences: other (see comments) (OSMAR)              Factors Affecting Nutritional Intake: NPO, nausea/vomiting, difficulty/impaired swallowing             "    Labs/Tests/Procedures/Meds       Pertinent Labs Reviewed: reviewed, pertinent  Pertinent Labs Comments: POCT Glu   Pertinent Medications Reviewed: reviewed, pertinent  Pertinent Medications Comments: heparin, asa, IVF    Physical Findings    Overall Physical Appearance: overweight  Tubes: gastrostomy tube     Skin: intact    Anthropometrics    Temp: 98.1 °F (36.7 °C)     Height: 5' 4" (162.6 cm)  Weight Method: Stated  Weight: 102.1 kg (225 lb 1.4 oz)     Ideal Body Weight (IBW), Female: 120 lb     % Ideal Body Weight, Female (lb): 187.58 lb  BMI (Calculated): 38.7  BMI Grade: 35 - 39.9 - obesity - grade II    Estimated/Assessed Needs    Weight Used For Calorie Calculations: 102.1 kg (225 lb 1.4 oz)         Energy Need Method: [place]-St Jeor, other (see comments) (7623-2466 kcal/d)       RMR ([place]-St. Jeor Equation): 1586        Weight Used For Protein Calculations: 102.1 kg (225 lb 1.4 oz)  Protein Requirements:  g/d    Fluid Need Method: RDA Method, other (see comments) (Per MD or 1 mL/kcal)       CHO Requirement: 50% total kcals     Assessment and Plan    Inadequate energy intake r/t inability to consume sufficient energy AEB NPO with no alternate means of nutrition.  Status: continues      Monitor and Evaluation    Food and Nutrient Intake: energy intake, food and beverage intake, enteral nutrition intake  Food and Nutrient Adminstration: diet order, enteral and parenteral nutrition administration     Physical Activity and Function: nutrition-related ADLs and IADLs  Anthropometric Measurements: weight, weight change, body mass index  Biochemical Data, Medical Tests and Procedures: electrolyte and renal panel, gastrointestinal profile, glucose/endocrine profile, inflammatory profile, lipid profile  Nutrition-Focused Physical Findings: overall appearance  % Intake of Estimated Energy Needs: 0 - 25 %  % Meal Intake: NPO    Nutrition Risk    Level of Risk: other (see comments) " (2x/week)    Nutrition Follow-Up    RD Follow-up?: Yes

## 2017-06-15 NOTE — PROCEDURES
ICU EEG/VIDEO MONITORING REPORT    Lucy Perez  [id number]  1960    DATE OF SERVICE: 6/14-15/17    DATE OF ADMISSION: 6/6/2017  8:27 PM    ADMITTING PROVIDER: Josue Berry MD    REASON FOR CONSULT: 55yo F with multiple medical co-morbidities, including seizures s/p prior R MCA stroke admitted with AMS.    MEDICATIONS:   Current Facility-Administered Medications   Medication    0.9%  NaCl infusion (for blood administration)    0.9%  NaCl infusion (for blood administration)    0.9%  NaCl infusion    acetaminophen tablet 650 mg    albuterol nebulizer solution 2.5 mg    amlodipine tablet 10 mg    aspirin chewable tablet 81 mg    carbamazepine chewable tablet 400 mg    ciprofloxacin HCl tablet 500 mg    dextrose 50% injection 12.5 g    famotidine tablet 20 mg    gabapentin capsule 300 mg    glucagon (human recombinant) injection 1 mg    heparin (porcine) injection 5,000 Units    hydrALAZINE injection 10 mg    hydrALAZINE tablet 50 mg    insulin aspart pen 1-10 Units    labetalol tablet 500 mg    levetiracetam oral soln Soln 1,500 mg    lisinopril tablet 10 mg    magnesium oxide tablet 800 mg    magnesium oxide tablet 800 mg    miconazole nitrate 2% ointment    polyethylene glycol packet 17 g    potassium chloride 10% solution 40 mEq    potassium chloride 10% solution 40 mEq    potassium chloride 10% solution 60 mEq    potassium, sodium phosphates 280-160-250 mg packet 2 packet    potassium, sodium phosphates 280-160-250 mg packet 2 packet    potassium, sodium phosphates 280-160-250 mg packet 2 packet    senna-docusate 8.6-50 mg per tablet 1 tablet    sodium chloride 0.9% flush 3 mL     METHODOLOGY   Electroencephalographic (EEG) recording is with electrodes placed according to the International 10-20 placement system.  Thirty two (32) channels of digital signal are simultaneously recorded from the scalp and may include EKG, EMG, and/or eye monitors.   Recording  band pass was 0.1 to 512 hz.  Digital video recording of the patient is simultaneously recorded with the EEG.  The nursing staff report clinical symptoms and may press an event button when the patient has symptoms of clinical interest to the treating physicians.  EEG and video recording is stored and archived in digital format.  The entire recording is visually reviewed and the times identified by computer analysis as being spikes or seizures are reviewed again.  Activation procedures which include photic stimulation, hyperventilation and instructing patients to perform simple task are done in selected patients.   Compresses spectral analysis (CSA) is also performed on the activity recorded from each individual channel.  This is displayed as a power display of frequencies from 0 to 30 Hz over time.   The CSA analysis is done and displayed continuously.  This is reviewed for asymmetries in power between homologous areas of the scalp and for presence of changes in power which canbe seen when seizures occur.  Sections of suspected abnormalities on the CSA is then compared with the original EEG recording.     e[employer]v software was also utilized in the review of this study.  This software suite analyzes the EEG recording in multiple domains.  Coherence and rhythmicity is computed to identify EEG sections which may contain organized seizures.  Each channel undergoes analysis to detect presence of spike and sharp waves which have special and morphological characteristic of epileptic activity.  The routine EEG recording is converted from spacial into frequency domain.  This is then displayed comparing homologous areas to identify areas of significant asymmetry.  Algorithm to identify non-cortically generated artifact is used to separate eye movement, EMG and other artifact from the EEG.      Recording Times  Start on 6/14/17, 16:30  Stop on 6/15/17, 07:00    A total of 14 hours and 27 minutes of EEG was recorded.    EEG  FINDINGS  Background activity:   The background rhythm was characterized by generalized delta-theta (4-6 Hz) activity  No posterior dominant rhythm seen.   Symmetry and continuity: the background was continuous; focal right sided slowing (delta > theta) seen.    Sleep:  Not seen.    Abnormal activity:   No epileptiform discharges, periodic discharges or electrographic seizures were seen.    IMPRESSION:   This is an abnormal C-EEG due to:  1) focal right sided slowing seen, suggestive of underlying structural lesion  2) moderate to severe generalized slowing seen, suggestive of moderate to severe diffuse or multifocal cerebral dysfunction.    No epileptiform activity or electrographic seizures seen.    CLINICAL CORRELATION IS RECOMMENDED.    Charlotte Gold MD, FRANCO(), FACNS.  Neurology-Epilepsy.

## 2017-06-15 NOTE — PLAN OF CARE
Problem: Patient Care Overview  Goal: Plan of Care Review  Outcome: Revised  POC reviewed with pt and  at 0500. Pt verbalized understanding. Questions and concerns addressed. No acute events overnight. BP managed with PO scheduled meds and IV PRN hydralazine. 2 loose bowel movements and Pur wick changed per policy.Pt progressing toward goals. Will continue to monitor. See flowsheets for full assessment and VS info

## 2017-06-16 PROBLEM — N30.00 ACUTE CYSTITIS: Status: ACTIVE | Noted: 2017-06-16

## 2017-06-16 PROBLEM — K59.00 CONSTIPATION: Status: ACTIVE | Noted: 2017-06-16

## 2017-06-16 PROBLEM — L30.4 INTERTRIGO: Status: ACTIVE | Noted: 2017-06-16

## 2017-06-16 LAB
ALBUMIN SERPL BCP-MCNC: 2.6 G/DL
ALP SERPL-CCNC: 101 U/L
ALT SERPL W/O P-5'-P-CCNC: 6 U/L
ANION GAP SERPL CALC-SCNC: 10 MMOL/L
AST SERPL-CCNC: 12 U/L
BASOPHILS # BLD AUTO: 0.03 K/UL
BASOPHILS NFR BLD: 0.5 %
BILIRUB SERPL-MCNC: 0.2 MG/DL
BUN SERPL-MCNC: 29 MG/DL
CALCIUM SERPL-MCNC: 8.4 MG/DL
CHLORIDE SERPL-SCNC: 112 MMOL/L
CO2 SERPL-SCNC: 20 MMOL/L
CREAT SERPL-MCNC: 1.6 MG/DL
DIFFERENTIAL METHOD: ABNORMAL
EOSINOPHIL # BLD AUTO: 0.3 K/UL
EOSINOPHIL NFR BLD: 5.5 %
ERYTHROCYTE [DISTWIDTH] IN BLOOD BY AUTOMATED COUNT: 16.3 %
EST. GFR  (AFRICAN AMERICAN): 40.7 ML/MIN/1.73 M^2
EST. GFR  (NON AFRICAN AMERICAN): 35.3 ML/MIN/1.73 M^2
GLUCOSE SERPL-MCNC: 85 MG/DL
HCT VFR BLD AUTO: 31.4 %
HGB BLD-MCNC: 9.9 G/DL
LYMPHOCYTES # BLD AUTO: 1.5 K/UL
LYMPHOCYTES NFR BLD: 24.4 %
MAGNESIUM SERPL-MCNC: 2.2 MG/DL
MCH RBC QN AUTO: 29.4 PG
MCHC RBC AUTO-ENTMCNC: 31.5 %
MCV RBC AUTO: 93 FL
MONOCYTES # BLD AUTO: 0.6 K/UL
MONOCYTES NFR BLD: 9.8 %
NEUTROPHILS # BLD AUTO: 3.7 K/UL
NEUTROPHILS NFR BLD: 59 %
PHOSPHATE SERPL-MCNC: 2.6 MG/DL
PLATELET # BLD AUTO: 170 K/UL
PMV BLD AUTO: 11.1 FL
POCT GLUCOSE: 126 MG/DL (ref 70–110)
POCT GLUCOSE: 170 MG/DL (ref 70–110)
POCT GLUCOSE: 181 MG/DL (ref 70–110)
POTASSIUM SERPL-SCNC: 4.8 MMOL/L
PROT SERPL-MCNC: 6 G/DL
RBC # BLD AUTO: 3.37 M/UL
SODIUM SERPL-SCNC: 142 MMOL/L
WBC # BLD AUTO: 6.2 K/UL

## 2017-06-16 PROCEDURE — 25000003 PHARM REV CODE 250: Performed by: NURSE PRACTITIONER

## 2017-06-16 PROCEDURE — 94640 AIRWAY INHALATION TREATMENT: CPT

## 2017-06-16 PROCEDURE — 25000003 PHARM REV CODE 250: Performed by: STUDENT IN AN ORGANIZED HEALTH CARE EDUCATION/TRAINING PROGRAM

## 2017-06-16 PROCEDURE — 80053 COMPREHEN METABOLIC PANEL: CPT

## 2017-06-16 PROCEDURE — 25000003 PHARM REV CODE 250: Performed by: PHYSICIAN ASSISTANT

## 2017-06-16 PROCEDURE — 99223 1ST HOSP IP/OBS HIGH 75: CPT | Mod: ,,, | Performed by: PSYCHIATRY & NEUROLOGY

## 2017-06-16 PROCEDURE — 11000001 HC ACUTE MED/SURG PRIVATE ROOM

## 2017-06-16 PROCEDURE — 84100 ASSAY OF PHOSPHORUS: CPT

## 2017-06-16 PROCEDURE — 97112 NEUROMUSCULAR REEDUCATION: CPT

## 2017-06-16 PROCEDURE — 25000003 PHARM REV CODE 250: Performed by: PSYCHIATRY & NEUROLOGY

## 2017-06-16 PROCEDURE — 83735 ASSAY OF MAGNESIUM: CPT

## 2017-06-16 PROCEDURE — 99232 SBSQ HOSP IP/OBS MODERATE 35: CPT | Mod: ,,, | Performed by: HOSPITALIST

## 2017-06-16 PROCEDURE — 85025 COMPLETE CBC W/AUTO DIFF WBC: CPT

## 2017-06-16 PROCEDURE — 25000242 PHARM REV CODE 250 ALT 637 W/ HCPCS: Performed by: PHYSICIAN ASSISTANT

## 2017-06-16 PROCEDURE — 97166 OT EVAL MOD COMPLEX 45 MIN: CPT

## 2017-06-16 PROCEDURE — 36415 COLL VENOUS BLD VENIPUNCTURE: CPT

## 2017-06-16 RX ADMIN — HYDRALAZINE HYDROCHLORIDE 75 MG: 50 TABLET ORAL at 09:06

## 2017-06-16 RX ADMIN — HEPARIN SODIUM 5000 UNITS: 5000 INJECTION, SOLUTION INTRAVENOUS; SUBCUTANEOUS at 02:06

## 2017-06-16 RX ADMIN — CARBAMAZEPINE 400 MG: 100 TABLET, CHEWABLE ORAL at 06:06

## 2017-06-16 RX ADMIN — INSULIN ASPART 2 UNITS: 100 INJECTION, SOLUTION INTRAVENOUS; SUBCUTANEOUS at 12:06

## 2017-06-16 RX ADMIN — LISINOPRIL 10 MG: 10 TABLET ORAL at 08:06

## 2017-06-16 RX ADMIN — STANDARDIZED SENNA CONCENTRATE AND DOCUSATE SODIUM 1 TABLET: 8.6; 5 TABLET, FILM COATED ORAL at 09:06

## 2017-06-16 RX ADMIN — HEPARIN SODIUM 5000 UNITS: 5000 INJECTION, SOLUTION INTRAVENOUS; SUBCUTANEOUS at 09:06

## 2017-06-16 RX ADMIN — Medication 3 ML: at 02:06

## 2017-06-16 RX ADMIN — CARBAMAZEPINE 400 MG: 100 TABLET, CHEWABLE ORAL at 02:06

## 2017-06-16 RX ADMIN — HEPARIN SODIUM 5000 UNITS: 5000 INJECTION, SOLUTION INTRAVENOUS; SUBCUTANEOUS at 06:06

## 2017-06-16 RX ADMIN — FAMOTIDINE 20 MG: 20 TABLET, FILM COATED ORAL at 06:06

## 2017-06-16 RX ADMIN — HYDRALAZINE HYDROCHLORIDE 75 MG: 50 TABLET ORAL at 06:06

## 2017-06-16 RX ADMIN — LABETALOL HCL 500 MG: 200 TABLET, FILM COATED ORAL at 07:06

## 2017-06-16 RX ADMIN — CIPROFLOXACIN HYDROCHLORIDE 500 MG: 500 TABLET, FILM COATED ORAL at 08:06

## 2017-06-16 RX ADMIN — CIPROFLOXACIN HYDROCHLORIDE 500 MG: 500 TABLET, FILM COATED ORAL at 09:06

## 2017-06-16 RX ADMIN — LEVETIRACETAM 2000 MG: 100 SOLUTION ORAL at 09:06

## 2017-06-16 RX ADMIN — ALBUTEROL SULFATE 2.5 MG: 2.5 SOLUTION RESPIRATORY (INHALATION) at 04:06

## 2017-06-16 RX ADMIN — ALBUTEROL SULFATE 2.5 MG: 2.5 SOLUTION RESPIRATORY (INHALATION) at 07:06

## 2017-06-16 RX ADMIN — ALBUTEROL SULFATE 2.5 MG: 2.5 SOLUTION RESPIRATORY (INHALATION) at 03:06

## 2017-06-16 RX ADMIN — MICONAZOLE NITRATE: 20 OINTMENT TOPICAL at 08:06

## 2017-06-16 RX ADMIN — LABETALOL HCL 500 MG: 200 TABLET, FILM COATED ORAL at 10:06

## 2017-06-16 RX ADMIN — GABAPENTIN 300 MG: 300 CAPSULE ORAL at 09:06

## 2017-06-16 RX ADMIN — INSULIN ASPART 1 UNITS: 100 INJECTION, SOLUTION INTRAVENOUS; SUBCUTANEOUS at 10:06

## 2017-06-16 RX ADMIN — MICONAZOLE NITRATE: 20 OINTMENT TOPICAL at 09:06

## 2017-06-16 RX ADMIN — HYDRALAZINE HYDROCHLORIDE 75 MG: 50 TABLET ORAL at 02:06

## 2017-06-16 RX ADMIN — ASPIRIN 81 MG CHEWABLE TABLET 81 MG: 81 TABLET CHEWABLE at 08:06

## 2017-06-16 RX ADMIN — ALBUTEROL SULFATE 2.5 MG: 2.5 SOLUTION RESPIRATORY (INHALATION) at 11:06

## 2017-06-16 RX ADMIN — LEVETIRACETAM 2000 MG: 100 SOLUTION ORAL at 08:06

## 2017-06-16 RX ADMIN — SODIUM CHLORIDE: 0.9 INJECTION, SOLUTION INTRAVENOUS at 10:06

## 2017-06-16 RX ADMIN — AMLODIPINE BESYLATE 10 MG: 10 TABLET ORAL at 08:06

## 2017-06-16 RX ADMIN — POLYETHYLENE GLYCOL 3350 17 G: 17 POWDER, FOR SOLUTION ORAL at 08:06

## 2017-06-16 RX ADMIN — LABETALOL HCL 500 MG: 200 TABLET, FILM COATED ORAL at 02:06

## 2017-06-16 RX ADMIN — STANDARDIZED SENNA CONCENTRATE AND DOCUSATE SODIUM 1 TABLET: 8.6; 5 TABLET, FILM COATED ORAL at 08:06

## 2017-06-16 NOTE — PROCEDURES
ICU EEG/VIDEO MONITORING REPORT    Lucy Perez  [id number]  1960    DATE OF SERVICE: 6/15-16/17    DATE OF ADMISSION: 6/6/2017  8:27 PM    ADMITTING PROVIDER: Josue Berry MD    REASON FOR CONSULT: 57yo F with multiple medical co-morbidities, including seizures s/p prior R MCA stroke admitted with AMS.    MEDICATIONS:   Current Facility-Administered Medications   Medication    0.9%  NaCl infusion (for blood administration)    0.9%  NaCl infusion (for blood administration)    0.9%  NaCl infusion    acetaminophen tablet 650 mg    albuterol nebulizer solution 2.5 mg    amlodipine tablet 10 mg    aspirin chewable tablet 81 mg    carbamazepine chewable tablet 400 mg    ciprofloxacin HCl tablet 500 mg    dextrose 50% injection 12.5 g    famotidine tablet 20 mg    gabapentin capsule 300 mg    glucagon (human recombinant) injection 1 mg    heparin (porcine) injection 5,000 Units    hydrALAZINE tablet 75 mg    insulin aspart pen 1-10 Units    labetalol tablet 500 mg    levetiracetam oral soln Soln 2,000 mg    lisinopril tablet 10 mg    magnesium oxide tablet 800 mg    magnesium oxide tablet 800 mg    miconazole nitrate 2% ointment    polyethylene glycol packet 17 g    potassium chloride 10% solution 40 mEq    potassium chloride 10% solution 40 mEq    potassium chloride 10% solution 60 mEq    potassium, sodium phosphates 280-160-250 mg packet 2 packet    potassium, sodium phosphates 280-160-250 mg packet 2 packet    potassium, sodium phosphates 280-160-250 mg packet 2 packet    senna-docusate 8.6-50 mg per tablet 1 tablet    sodium chloride 0.9% flush 3 mL     METHODOLOGY   Electroencephalographic (EEG) recording is with electrodes placed according to the International 10-20 placement system.  Thirty two (32) channels of digital signal are simultaneously recorded from the scalp and may include EKG, EMG, and/or eye monitors.   Recording band pass was 0.1 to 512 hz.   Digital video recording of the patient is simultaneously recorded with the EEG.  The nursing staff report clinical symptoms and may press an event button when the patient has symptoms of clinical interest to the treating physicians.  EEG and video recording is stored and archived in digital format.  The entire recording is visually reviewed and the times identified by computer analysis as being spikes or seizures are reviewed again.  Activation procedures which include photic stimulation, hyperventilation and instructing patients to perform simple task are done in selected patients.   Compresses spectral analysis (CSA) is also performed on the activity recorded from each individual channel.  This is displayed as a power display of frequencies from 0 to 30 Hz over time.   The CSA analysis is done and displayed continuously.  This is reviewed for asymmetries in power between homologous areas of the scalp and for presence of changes in power which canbe seen when seizures occur.  Sections of suspected abnormalities on the CSA is then compared with the original EEG recording.     [employer] software was also utilized in the review of this study.  This software suite analyzes the EEG recording in multiple domains.  Coherence and rhythmicity is computed to identify EEG sections which may contain organized seizures.  Each channel undergoes analysis to detect presence of spike and sharp waves which have special and morphological characteristic of epileptic activity.  The routine EEG recording is converted from spacial into frequency domain.  This is then displayed comparing homologous areas to identify areas of significant asymmetry.  Algorithm to identify non-cortically generated artifact is used to separate eye movement, EMG and other artifact from the EEG.      Recording Times  Start on 6/15/17, 16:30  Stop on 6/16/17, 11:12    A total of 23 hours and 58 minutes of EEG was recorded.    EEG FINDINGS  Background activity:    The background rhythm was characterized by generalized delta-theta (4-6 Hz) activity  No posterior dominant rhythm seen.   Symmetry and continuity: the background was continuous; focal right sided slowing (delta > theta) seen.    Sleep:  Not seen.    Abnormal activity:   No epileptiform discharges, periodic discharges or electrographic seizures were seen.  PB events noted without any associated epileptiform electrographic abnormality    IMPRESSION:   This is an abnormal C-EEG due to:  1) focal right sided slowing seen, suggestive of underlying structural lesion  2) moderate to severe generalized slowing seen, suggestive of moderate to severe diffuse or multifocal cerebral dysfunction.  3) rare right sided sharp waves seen  No electrographic seizures seen.    CLINICAL CORRELATION IS RECOMMENDED.    Charlotte Gold MD, FRANCO(), FACNS.  Neurology-Epilepsy.

## 2017-06-16 NOTE — PLAN OF CARE
Passed by room, pt undergoing VEEG. She stated her name slowly but that's it.  Eyes opened and alert  Per chart notes, plan is home health: [facility]  No family at bs this morning.

## 2017-06-16 NOTE — MEDICAL/APP STUDENT
CC: AMS  HPI: 55yo F with multiple medical co-morbidities, including T2DM, HLD, HTN, sarcoidosis, rheumatoid arthritis, CKD 3, and seizures s/p prior R MCA stroke with residual Left-sided weakness, stepped down from Neuro Crit Care on 6/14 consulted for AMS and partial symptomatic epilepsy with complex partial seizures, without status epilepticus. She was initially admitted to [facility] for . Pt's , daughter and uncle are at the bedside to provide her history.During her time in neuro crit care, she had one episode of altered mental status. She suddenly stopped talking, became non-responsive and began to stare at the ceiling for a few hours and resolved spontaneously.

## 2017-06-16 NOTE — PLAN OF CARE
Problem: Patient Care Overview  Goal: Plan of Care Review  Patient resting in bed. She is currently alert but had intermittent episodes of staring into space and unresponsiveness. EEG monitoring in progress this AM and DC'd around noon.  Tolerating dental soft diet with nectar thick liquids. PEG tube flushed with medication administration.  Pure wick applied and connected to 40 mmHg wall suction.  Turned and reposition. Skin kept clean and dry. Fall precautions maintained at all times. Spouse at bedside.  Patient's BP fluctuating, Dr. De La Rosa made aware.

## 2017-06-16 NOTE — PT/OT/SLP EVAL
Occupational Therapy  Evaluation/ Treatment    Lucy Perez   MRN: [id number]   Admitting Diagnosis: Partial symptomatic epilepsy with complex partial seizures, not intractable, without status epilepticus    OT Date of Treatment: 06/16/17   OT Start Time: 1354  OT Stop Time: 1434  OT Total Time (min): 40 min    Billable Minutes:  Evaluation 15 minutes  Neuromuscular Re-education 25 minutes    Diagnosis: Partial symptomatic epilepsy with complex partial seizures, not intractable, without status epilepticus   Decreased cognition; decreased L side function, decreased strength, endurance, balance, ROM    Past Medical History:   Diagnosis Date    Acute hypoxemic respiratory failure     Acute on chronic diastolic heart failure 11/7/2016    Anemia of chronic disease 6/10/2017    Anxiety     Asthma     Bipolar disorder     Bronchitis, acute     Cataract     CKD (chronic kidney disease) stage 3, GFR 30-59 ml/min     stage 3    Depression     Diabetes with neurologic complications     General anesthetics causing adverse effect in therapeutic use     Hemorrhagic cerebrovascular accident (CVA)     8/2016 s/[place] [facility] [facility] [place] hemiparesis    Hyperlipidemia     Hypertension     Obesity     Partial symptomatic epilepsy with complex partial seizures, not intractable, without status epilepticus     Peripheral neuropathy     Pneumonia     Rheumatoid arthritis     Sarcoidosis     Sleep apnea     CPAP    Type II or unspecified type diabetes mellitus with renal manifestations, uncontrolled       Past Surgical History:   Procedure Laterality Date    breast reduction      BREAST SURGERY      breast reduction    COLONOSCOPY N/A 8/11/2016    Procedure: COLONOSCOPY;  Surgeon: Jerry Vilchis MD;  Location: [address];  Service: Endoscopy;  Laterality: N/A;  Patient reports difficulty awaking from anesthesia in the past.    HYSTERECTOMY      ROTATOR CUFF REPAIR Right July 9, 2014     right side    stent placed      in vertebral artery    TUBAL LIGATION         Referring physician: MADELEINE De La Rosa  Date referred to OT: 6/16/2017    General Precautions: Standard, aspiration, fall    Do you have any cultural, spiritual, Muslim conflicts, given your current situation?: n/a     Patient History:  Living Environment  Lives With: spouse  Living Arrangements: house  Home Accessibility: stairs to enter home  Home Layout: Able to live on 1st floor  Number of Stairs to Enter Home:  (lift outside for 8 DARYL)  Transportation Available: family or friend will provide  Living Environment Comment: Pt lives in a raised house with . The home has a lift to enter the home. Pt requires A with all ADLs and uses a shaunna lift dependently for transfers. Pt has a roll-in shower but not an appropriate w/c. [place] [facility] [place] with .  Equipment Currently Used at Home: wheelchair, hospital bed, lift device    Prior level of function:   Bed Mobility/Transfers: needs device and assist  Grooming: needs assist  Bathing: needs assist  Upper Body Dressing: needs assist  Lower Body Dressing: needs assist  Toileting: needs assist  Home Management Skills: unable to perform  Homemaking Responsibilities: No  Mode of Transportation: Family     Dominant hand: right    Subjective:  Communicated with RN prior to session.    Chief Complaint: none  Patient/Family stated goals: get better    Pain/Comfort  Pain Rating 1: 0/10  Pain Rating Post-Intervention 1: 0/10    Objective:  Patient found with: arterial line, bed alarm, blood pressure cuff, bright catheter, pressure relief boots, peripheral IV, pulse ox (continuous), telemetry, pt found supine and agreeable to therapy; family present and agreeable as well.    Cognitive Exam:  Oriented to: Person and Place  Follows Commands/attention: Inattentive and Easily distracted  Communication: mostly nonverbal  Memory:  Impaired STM and Impaired LTM  Safety  awareness/insight to disability: impaired  Coping skills/emotional control: flat; occasional smile    Visual/perceptual:  Appeared intact    Physical Exam:  Postural examination/scapula alignment: Rounded shoulder, Head forward, Posterior pelvic tilt and Lateral weight shift of hips  Skin integrity: Visible skin intact  Edema: Moderate throughout extremities, worse on L hand    Sensation:   Impaired L side    Upper Extremity Range of Motion:  Right Upper Extremity: impaired 2/2 command following  Left Upper Extremity: same    Upper Extremity Strength:  Right Upper Extremity: 2/5  Left Upper Extremity: 0/5   Strength: OSMAR    Fine motor coordination:   impaired    Gross motor coordination: impaired    Functional Mobility:  Bed Mobility:  Rolling/Turning to Left: Total assistance  Rolling/Turning Right: Total assistance  Scooting/Bridging: Total Assistance, With assist of 2  Supine to Sit: Total Assistance  Sit to Supine: Total Assistance    Transfers:  Sit <> Stand Assistance: Total Assistance (unable to clear hips 2/2 posterior lean)  Sit <> Stand Assistive Device: No Assistive Device    Functional Ambulation: did not occur    Activities of Daily Living:    UE Dressing Level of Assistance: Total assistance  LE Dressing Level of Assistance: Total assistance (socks)  Grooming Position:  (supine in bed with HOB elevated to ~80 degrees)  Grooming Level of Assistance: Stand by assistance (washcloth setup, able to wipe mouth, but would require increased A to complete task of washing entire face)    Balance:   Static Sit: 0: Needs MAXIMAL assist to maintain sitting without back support - could range to CGA, then occasional SBA with RUE support on side rail  Dynamic Sit: 0: N/A    Therapeutic Activities and Exercises:  Pt ed re OT role and POC. Pt required Total A for all bed mobility and self care tasks. Pt could range to CGA and occasional SBA for EOB sitting balance. Pt tolerated WB on both R elbow and L elbow to  "improve balance (R), and tone (L). Pt required Total A to return to supine and scoot to HOB.    AM-PAC 6 CLICK ADL  How much help from another person does this patient currently need?  1 = Unable, Total/Dependent Assistance  2 = A lot, Maximum/Moderate Assistance  3 = A little, Minimum/Contact Guard/Supervision  4 = None, Modified [place]/Independent    Putting on and taking off regular lower body clothing? : 1  Bathing (including washing, rinsing, drying)?: 1  Toileting, which includes using toilet, bedpan, or urinal? : 1  Putting on and taking off regular upper body clothing?: 1  Taking care of personal grooming such as brushing teeth?: 2  Eating meals?: 2  Total Score: 8    AM-PAC Raw Score CMS "G-Code Modifier Level of Impairment Assistance   6 % Total / Unable   7 - 9 CM 80 - 100% Maximal Assist   10-14 CL 60 - 80% Moderate Assist   15 - 19 CK 40 - 60% Moderate Assist   20 - 22 CJ 20 - 40% Minimal Assist   23 CI 1-20% SBA / CGA   24 CH 0% Independent/ Mod I       Patient left HOB elevated with all lines intact, call button in reach, bed alarm on and family present    Assessment:  Lucy Perez is a 56 y.o. female with a medical diagnosis of Partial symptomatic epilepsy with complex partial seizures, not intractable, without status epilepticus and presents with the impairments listed below. Pt is mostly nonverbal, but did state her goals are to be able to do more (via yes or no questions). Pt requiring Total A, but would benefit from cont OT and HH therapy to improve strength, endurance, balance, ROM, to decrease tone and caregiver burden.    Rehab identified problem list/impairments: Rehab identified problem list/impairments: weakness, impaired endurance, impaired sensation, impaired self care skills, impaired functional mobilty, gait instability, impaired balance, impaired cognition, decreased upper extremity function, decreased lower extremity function, abnormal tone, decreased safety " awareness, decreased ROM, impaired coordination, impaired fine motor, impaired skin, edema, impaired joint extensibility, impaired muscle length, impaired cardiopulmonary response to activity    Rehab potential is fair.    Activity tolerance: Fair    Discharge recommendations: Discharge Facility/Level Of Care Needs: home with home health     Barriers to discharge: Barriers to Discharge: None    Equipment recommendations:  (mobile shower chair)     GOALS:    Occupational Therapy Goals        Problem: Occupational Therapy Goal    Goal Priority Disciplines Outcome Interventions   Occupational Therapy Goal     OT, PT/OT Ongoing (interventions implemented as appropriate)    Description:  Goals to be met by: 6/30/17     Patient will increase functional independence with ADLs by performing:    Feeding with Minimal Assistance.  Grooming while EOB with Moderate Assistance.  Sitting at edge of bed x15 minutes with Contact Guard Assistance.  Rolling to Left with Minimal Assistance and use of bedrail as needed.   Rolling to Right with Maximum Assistance.   Supine to sit with Moderate Assistance.                      PLAN:  Patient to be seen 3 x/week to address the above listed problems via self-care/home management, therapeutic activities, therapeutic exercises, neuromuscular re-education, cognitive retraining, wheelchair management/training  Plan of Care expires: 07/16/17  Plan of Care reviewed with: patient, family    Kian CamposCARLYLE  6/16/2017  Pager: [phone]

## 2017-06-16 NOTE — CONSULTS
"Consult Note      Reason for Consult: Fluctuating mental status. Possible seizure      SUBJECTIVE:     History of Present Illness:  Lucy Perez is a 56 y.o. female with a PMHx of CVA in August and November with residual left sided weakness, Type 2 DM, HLD, HTN, sarcoid, RA, CKD 3 presenting with aphasia, unresponsiveness and poor motor effort since 9 AM today. The patient's family reports that the patient has had an elevated BP to the 180s for the past week. They report that at times the patient "will be sleepy" and poorly responsive and usually improves within a few hours. However,  At about 2pm she was found to be staring up the ceiling and remained unresponsive.  The family attempted to feed the patient at lunch through her PEG tube but the patient vomited shortly thereafter. Her baseline is described as dependent for activities of daily living, verbal and interactive with limited mobility due to left hemiparesis. She had hemicranectomy for stroke treatment in 2016. She is currently on keppra and tegretol and per family was started post stroke for seizure prophylaxis. No history of seizure disorder per patient's daughter. We were consulted for fluctuating mental status, starring spells and concerning for seizure.       Review of patient's allergies indicates:   Allergen Reactions    Vicodin [hydrocodone-acetaminophen] Rash       Past Medical History:   Diagnosis Date    Acute hypoxemic respiratory failure     Acute on chronic diastolic heart failure 11/7/2016    Anemia of chronic disease 6/10/2017    Anxiety     Asthma     Bipolar disorder     Bronchitis, acute     Cataract     CKD (chronic kidney disease) stage 3, GFR 30-59 ml/min     stage 3    Depression     Diabetes with neurologic complications     General anesthetics causing adverse effect in therapeutic use     Hemorrhagic cerebrovascular accident (CVA)     8/2016 [place] [facility] [place] hemiparesis    " "Hyperlipidemia     Hypertension     Obesity     Partial symptomatic epilepsy with complex partial seizures, not intractable, without status epilepticus     Peripheral neuropathy     Pneumonia     Rheumatoid arthritis     Sarcoidosis     Sleep apnea     CPAP    Type II or unspecified type diabetes mellitus with renal manifestations, uncontrolled      Past Surgical History:   Procedure Laterality Date    breast reduction      BREAST SURGERY      breast reduction    COLONOSCOPY N/A 8/11/2016    Procedure: COLONOSCOPY;  Surgeon: Jerry Vilchis MD;  Location: [facility] ([address] [place] [address]);  Service: Endoscopy;  Laterality: N/A;  Patient reports difficulty awaking from anesthesia in the past.    HYSTERECTOMY      ROTATOR CUFF REPAIR Right July 9, 2014    right side    stent placed      in vertebral artery    TUBAL LIGATION       Family History   Problem Relation Age of Onset    Cancer Mother 55     Breast cancer    Diabetes Mother     Hypertension Mother     Heart disease Mother     Heart attack Mother     Stroke Sister     Hypertension Sister     Sleep apnea Sister     No Known Problems Daughter     No Known Problems Son     Bell's palsy Sister     Lupus Sister     Blindness Maternal Grandmother     Diabetes       "My entire family family has diabetes"    Stroke Maternal Aunt     Amblyopia Neg Hx     Cataracts Neg Hx     Glaucoma Neg Hx     Macular degeneration Neg Hx     Retinal detachment Neg Hx     Strabismus Neg Hx      Social History   Substance Use Topics    Smoking status: Never Smoker    Smokeless tobacco: Never Used    Alcohol use No      Comment: occasional wine cooler        Review of Systems:  Unable to assess     OBJECTIVE:     Vital Signs:  Temp:  [97.2 °F (36.2 °C)-98.7 °F (37.1 °C)]   Pulse:  [57-79]   Resp:  [12-20]   BP: (105-198)/(53-83)   SpO2:  [94 %-99 %]     Physical Exam:  HEENT: Prior R crani.  Chest Heart RRR / Lungs Clear to auscultation  Abdomen: Soft nontender + " BS  Extremities: OK distal pulses. Significant RLE atrophy.  Skin:     Neurology Exam:   MS: Oriented to self. Left gaze preference. Noted horizontal nystagmus. Her mental status was fluctuating.      CN: II-XII  No cranial nerve abnormalities noted. Noted horizontal nystagmus.   Motor: LUE   0/5 / RUE 5 /5  Tone normal bilaterally L- spasticity.              LLE  0 /5 /  RLE 5 /5  Tone normal bilaterally  RLE pain with movement.  Sensory: Responded to pain   DTR:  2+   Coordination : unable to assess   Gait: Not tested.  Meningeal signs: Absent    Laboratory:  CBC:   Recent Labs  Lab 06/16/17  0504   WBC 6.20   RBC 3.37*   HGB 9.9*   HCT 31.4*      MCV 93   MCH 29.4   MCHC 31.5*     CMP:   Recent Labs  Lab 06/16/17  0504   GLU 85   CALCIUM 8.4*   ALBUMIN 2.6*   PROT 6.0      K 4.8   CO2 20*   *   BUN 29*   CREATININE 1.6*   ALKPHOS 101   ALT 6*   AST 12   BILITOT 0.2     No results for input(s): COLORU, CLARITYU, SPECGRAV, PHUR, PROTEINUA, GLUCOSEU, BILIRUBINCON, BLOODU, WBCU, RBCU, BACTERIA, MUCUS, NITRITE, LEUKOCYTESUR, UROBILINOGEN, HYALINECASTS in the last 168 hours.    Diagnostic Results:  MRI Brain:  No acute intracranial abnormality.  Stable postoperative changes of a prior right cranioplasty for decompression of prior right hemorrhagic MCA territory infarct. Mild persistent edema without mass effect.  Left corona radiata hemorrhagic infarct, stable since recent CT but new from prior MRI 10/28/16.    ASSESSMENT/PLAN:     Lucy Perez is a 56 y.o. female with a PMHx of CVA in August and November with residual left sided weakness, Type 2 DM, HLD, HTN, sarcoid, RA, CKD 3 presenting with aphasia, unresponsiveness admitted to [facility]. Stepdown yesterday. We were consulted for fluctuating mental status and starring spells.      -- Partial Sz activity.- Possible NCSE - EEG tech informed to hook her up for EEG   -- R-MCA stroke with R-putaminal hemorrhagic transformation. S/P R  decompressive hemicrani.  -- UTI with enterobacter.     Plan:     -- Will follow 24 hour EEG   -- On carbamazepine 400 mg TID  -- On Keppra 2000 mg BID   -- Case discussed with Dr Galdamez   -- Will follow        Mina Gandara MD  Neurology Resident   [facility] [address] [place] [address]  Pager: [phone]

## 2017-06-16 NOTE — PLAN OF CARE
Problem: Patient Care Overview  Goal: Plan of Care Review  Outcome: Ongoing (interventions implemented as appropriate)  Plan of care reviewed with patient and family. Stable vital signs and AAOx4. Continuous EEG monitoring in place.  NS infusing at 50m/hr.  Left arterial line removed, no complications noted.  Patient voiding kevan urine via purewick catheter.  Patient tolerating soft dental [id number] diet with thickened liquids.  Patient turned/repostioned q2 and remains free from falls.     See MAR and Flowsheets for further details.

## 2017-06-16 NOTE — NURSING TRANSFER
Nursing Transfer Note      6/15/2017     [facility]     Transfer via bed    Transfer with Telemonitor    Transported by RNx1 and PCTx1    Medicines sent: YES    Chart send with patient: YES    Notified: Oncoming nurse    Patient reassessed at:     Upon arrival to floor: Chart brought to

## 2017-06-16 NOTE — PLAN OF CARE
Transition of care note    Transfer to room 903 A    DX:Seizure disorder [G40.909]  Altered mental status, unspecified altered mental status type [R41.82]  Altered mental status, unspecified altered mental status type [R41.82]     Extended Emergency Contact Information  Primary Emergency Contact: Wilder Perez  Address: [address] [place] [address] [place]  Mobile Phone: [phone]  Relation: Spouse  Secondary Emergency Contact: Reina Perez  Address: [address]  Home Phone: [phone]  Mobile Phone: [phone]  Relation: Daughter     PT/OT: Physical Therapy    Insurance:Payor: [employer] MANAGED MEDICARE / Plan: [employer] MEDICARE HMO / Product Type: Capitation /      PCP:Beverly Muniz MD     Pharmacy:  [facility] [address]  [address] [facility] [address]  Phone: [phone] Fax: [phone]    [facility] [address] [place] [address] [address] [place] [address]  [address] [place] [address]  Phone: [phone] Fax: [phone]      Future Appointments  Date Time Provider Department Center   6/16/2017 9:00 AM NEURODIAGNOSTICS, APPT [facility] NEURODS Rayo Britt   7/14/2017 10:40 AM Pola Galloway MD [facility] N[facility] [place]   7/31/2017 1:15 PM Fior Ricks DPM [facility]  [id number]

## 2017-06-16 NOTE — PLAN OF CARE
Problem: Occupational Therapy Goal  Goal: Occupational Therapy Goal  Goals to be met by: 6/30/17     Patient will increase functional independence with ADLs by performing:    Feeding with Minimal Assistance.  Grooming while EOB with Moderate Assistance.  Sitting at edge of bed x15 minutes with Contact Guard Assistance.  Rolling to Left with Minimal Assistance and use of bedrail as needed.   Rolling to Right with Maximum Assistance.   Supine to sit with Moderate Assistance.    Outcome: Ongoing (interventions implemented as appropriate)  OT eval completed. The above goals are established to improve functional (I) and mobility.    CARLYLE Galeano  6/16/2017  Pager: [phone]

## 2017-06-17 PROBLEM — I16.9 HYPERTENSIVE CRISIS: Status: RESOLVED | Noted: 2017-06-07 | Resolved: 2017-06-17

## 2017-06-17 LAB
ALBUMIN SERPL BCP-MCNC: 2.6 G/DL
ALP SERPL-CCNC: 92 U/L
ALT SERPL W/O P-5'-P-CCNC: 7 U/L
ANION GAP SERPL CALC-SCNC: 8 MMOL/L
AST SERPL-CCNC: 11 U/L
BASOPHILS # BLD AUTO: 0.02 K/UL
BASOPHILS NFR BLD: 0.4 %
BILIRUB SERPL-MCNC: 0.2 MG/DL
BUN SERPL-MCNC: 26 MG/DL
CALCIUM SERPL-MCNC: 8.3 MG/DL
CHLORIDE SERPL-SCNC: 112 MMOL/L
CO2 SERPL-SCNC: 21 MMOL/L
CREAT SERPL-MCNC: 1.7 MG/DL
DIFFERENTIAL METHOD: ABNORMAL
EOSINOPHIL # BLD AUTO: 0.3 K/UL
EOSINOPHIL NFR BLD: 5.1 %
ERYTHROCYTE [DISTWIDTH] IN BLOOD BY AUTOMATED COUNT: 16 %
EST. GFR  (AFRICAN AMERICAN): 37.8 ML/MIN/1.73 M^2
EST. GFR  (NON AFRICAN AMERICAN): 32.8 ML/MIN/1.73 M^2
GLUCOSE SERPL-MCNC: 122 MG/DL
HCT VFR BLD AUTO: 30.1 %
HGB BLD-MCNC: 9.5 G/DL
LYMPHOCYTES # BLD AUTO: 1.2 K/UL
LYMPHOCYTES NFR BLD: 21.9 %
MAGNESIUM SERPL-MCNC: 2.1 MG/DL
MCH RBC QN AUTO: 29.7 PG
MCHC RBC AUTO-ENTMCNC: 31.6 %
MCV RBC AUTO: 94 FL
MONOCYTES # BLD AUTO: 0.5 K/UL
MONOCYTES NFR BLD: 8.4 %
NEUTROPHILS # BLD AUTO: 3.5 K/UL
NEUTROPHILS NFR BLD: 63.7 %
PHOSPHATE SERPL-MCNC: 2.4 MG/DL
PLATELET # BLD AUTO: 230 K/UL
PMV BLD AUTO: 10.6 FL
POCT GLUCOSE: 117 MG/DL (ref 70–110)
POCT GLUCOSE: 151 MG/DL (ref 70–110)
POCT GLUCOSE: 161 MG/DL (ref 70–110)
POCT GLUCOSE: 163 MG/DL (ref 70–110)
POCT GLUCOSE: 163 MG/DL (ref 70–110)
POTASSIUM SERPL-SCNC: 4.8 MMOL/L
PROT SERPL-MCNC: 5.8 G/DL
RBC # BLD AUTO: 3.2 M/UL
SODIUM SERPL-SCNC: 141 MMOL/L
WBC # BLD AUTO: 5.47 K/UL

## 2017-06-17 PROCEDURE — 25000242 PHARM REV CODE 250 ALT 637 W/ HCPCS: Performed by: PHYSICIAN ASSISTANT

## 2017-06-17 PROCEDURE — 94640 AIRWAY INHALATION TREATMENT: CPT

## 2017-06-17 PROCEDURE — 25000003 PHARM REV CODE 250: Performed by: STUDENT IN AN ORGANIZED HEALTH CARE EDUCATION/TRAINING PROGRAM

## 2017-06-17 PROCEDURE — 25000003 PHARM REV CODE 250: Performed by: NURSE PRACTITIONER

## 2017-06-17 PROCEDURE — 85025 COMPLETE CBC W/AUTO DIFF WBC: CPT

## 2017-06-17 PROCEDURE — 92526 ORAL FUNCTION THERAPY: CPT

## 2017-06-17 PROCEDURE — 25000003 PHARM REV CODE 250: Performed by: PHYSICIAN ASSISTANT

## 2017-06-17 PROCEDURE — 83735 ASSAY OF MAGNESIUM: CPT

## 2017-06-17 PROCEDURE — 99232 SBSQ HOSP IP/OBS MODERATE 35: CPT | Mod: ,,, | Performed by: PSYCHIATRY & NEUROLOGY

## 2017-06-17 PROCEDURE — 11000001 HC ACUTE MED/SURG PRIVATE ROOM

## 2017-06-17 PROCEDURE — 25000003 PHARM REV CODE 250: Performed by: PSYCHIATRY & NEUROLOGY

## 2017-06-17 PROCEDURE — 36415 COLL VENOUS BLD VENIPUNCTURE: CPT

## 2017-06-17 PROCEDURE — 94761 N-INVAS EAR/PLS OXIMETRY MLT: CPT

## 2017-06-17 PROCEDURE — 80053 COMPREHEN METABOLIC PANEL: CPT

## 2017-06-17 PROCEDURE — 99232 SBSQ HOSP IP/OBS MODERATE 35: CPT | Mod: ,,, | Performed by: HOSPITALIST

## 2017-06-17 PROCEDURE — 84100 ASSAY OF PHOSPHORUS: CPT

## 2017-06-17 RX ADMIN — SODIUM CHLORIDE: 0.9 INJECTION, SOLUTION INTRAVENOUS at 05:06

## 2017-06-17 RX ADMIN — POLYETHYLENE GLYCOL 3350 17 G: 17 POWDER, FOR SOLUTION ORAL at 09:06

## 2017-06-17 RX ADMIN — HEPARIN SODIUM 5000 UNITS: 5000 INJECTION, SOLUTION INTRAVENOUS; SUBCUTANEOUS at 06:06

## 2017-06-17 RX ADMIN — MICONAZOLE NITRATE: 20 OINTMENT TOPICAL at 09:06

## 2017-06-17 RX ADMIN — HYDRALAZINE HYDROCHLORIDE 75 MG: 50 TABLET ORAL at 08:06

## 2017-06-17 RX ADMIN — ASPIRIN 81 MG CHEWABLE TABLET 81 MG: 81 TABLET CHEWABLE at 09:06

## 2017-06-17 RX ADMIN — HEPARIN SODIUM 5000 UNITS: 5000 INJECTION, SOLUTION INTRAVENOUS; SUBCUTANEOUS at 01:06

## 2017-06-17 RX ADMIN — LEVETIRACETAM 2000 MG: 100 SOLUTION ORAL at 08:06

## 2017-06-17 RX ADMIN — HYDRALAZINE HYDROCHLORIDE 75 MG: 50 TABLET ORAL at 01:06

## 2017-06-17 RX ADMIN — STANDARDIZED SENNA CONCENTRATE AND DOCUSATE SODIUM 1 TABLET: 8.6; 5 TABLET, FILM COATED ORAL at 08:06

## 2017-06-17 RX ADMIN — LABETALOL HCL 500 MG: 200 TABLET, FILM COATED ORAL at 06:06

## 2017-06-17 RX ADMIN — LABETALOL HCL 500 MG: 200 TABLET, FILM COATED ORAL at 01:06

## 2017-06-17 RX ADMIN — LEVETIRACETAM 2000 MG: 100 SOLUTION ORAL at 09:06

## 2017-06-17 RX ADMIN — ALBUTEROL SULFATE 2.5 MG: 2.5 SOLUTION RESPIRATORY (INHALATION) at 07:06

## 2017-06-17 RX ADMIN — ALBUTEROL SULFATE 2.5 MG: 2.5 SOLUTION RESPIRATORY (INHALATION) at 11:06

## 2017-06-17 RX ADMIN — STANDARDIZED SENNA CONCENTRATE AND DOCUSATE SODIUM 1 TABLET: 8.6; 5 TABLET, FILM COATED ORAL at 09:06

## 2017-06-17 RX ADMIN — ALBUTEROL SULFATE 2.5 MG: 2.5 SOLUTION RESPIRATORY (INHALATION) at 12:06

## 2017-06-17 RX ADMIN — LABETALOL HCL 500 MG: 200 TABLET, FILM COATED ORAL at 08:06

## 2017-06-17 RX ADMIN — HYDRALAZINE HYDROCHLORIDE 75 MG: 50 TABLET ORAL at 06:06

## 2017-06-17 RX ADMIN — AMLODIPINE BESYLATE 10 MG: 10 TABLET ORAL at 09:06

## 2017-06-17 RX ADMIN — ALBUTEROL SULFATE 2.5 MG: 2.5 SOLUTION RESPIRATORY (INHALATION) at 03:06

## 2017-06-17 RX ADMIN — Medication 3 ML: at 01:06

## 2017-06-17 RX ADMIN — CIPROFLOXACIN HYDROCHLORIDE 500 MG: 500 TABLET, FILM COATED ORAL at 08:06

## 2017-06-17 RX ADMIN — GABAPENTIN 300 MG: 300 CAPSULE ORAL at 08:06

## 2017-06-17 RX ADMIN — CIPROFLOXACIN HYDROCHLORIDE 500 MG: 500 TABLET, FILM COATED ORAL at 09:06

## 2017-06-17 RX ADMIN — HEPARIN SODIUM 5000 UNITS: 5000 INJECTION, SOLUTION INTRAVENOUS; SUBCUTANEOUS at 08:06

## 2017-06-17 RX ADMIN — INSULIN ASPART 2 UNITS: 100 INJECTION, SOLUTION INTRAVENOUS; SUBCUTANEOUS at 12:06

## 2017-06-17 RX ADMIN — LISINOPRIL 10 MG: 10 TABLET ORAL at 09:06

## 2017-06-17 RX ADMIN — FAMOTIDINE 20 MG: 20 TABLET, FILM COATED ORAL at 06:06

## 2017-06-17 RX ADMIN — INSULIN ASPART 2 UNITS: 100 INJECTION, SOLUTION INTRAVENOUS; SUBCUTANEOUS at 05:06

## 2017-06-17 NOTE — SUBJECTIVE & OBJECTIVE
Interval History: Stepped down from Neuro critical care.     Review of Systems   Unable to perform ROS: Mental status change     Objective:     Vital Signs (Most Recent):  Temp: 99.1 °F (37.3 °C) (06/17/17 1600)  Pulse: 76 (06/17/17 1600)  Resp: 20 (06/17/17 1600)  BP: (!) 180/83 (06/17/17 1600)  SpO2: 95 % (06/17/17 1600) Vital Signs (24h Range):  Temp:  [96.7 °F (35.9 °C)-99.1 °F (37.3 °C)] 99.1 °F (37.3 °C)  Pulse:  [] 76  Resp:  [16-20] 20  SpO2:  [92 %-100 %] 95 %  BP: (147-183)/(67-90) 180/83     Weight: 102.1 kg (225 lb 1.4 oz)  Body mass index is 38.64 kg/m².    Intake/Output Summary (Last 24 hours) at 06/17/17 1701  Last data filed at 06/17/17 0920   Gross per 24 hour   Intake              360 ml   Output              500 ml   Net             -140 ml      Physical Exam   Constitutional: She appears well-developed and well-nourished.   Cardiovascular: Normal rate, regular rhythm and normal heart sounds.  Exam reveals no gallop and no friction rub.    No murmur heard.  Pulmonary/Chest: Effort normal and breath sounds normal. No respiratory distress. She has no wheezes. She has no rales.   Abdominal: Soft. Bowel sounds are normal. She exhibits no distension. There is no tenderness.   Musculoskeletal: Normal range of motion.   Neurological: She is alert.   Leftward gaze, intermittently follows commands   Skin: Skin is warm and dry.       Significant Labs:   CBC:     Recent Labs  Lab 06/16/17  0504 06/17/17  0423   WBC 6.20 5.47   HGB 9.9* 9.5*   HCT 31.4* 30.1*    230     CMP:     Recent Labs  Lab 06/16/17  0504 06/17/17  0423    141   K 4.8 4.8   * 112*   CO2 20* 21*   GLU 85 122*   BUN 29* 26*   CREATININE 1.6* 1.7*   CALCIUM 8.4* 8.3*   PROT 6.0 5.8*   ALBUMIN 2.6* 2.6*   BILITOT 0.2 0.2   ALKPHOS 101 92   AST 12 11   ALT 6* 7*   ANIONGAP 10 8   EGFRNONAA 35.3* 32.8*       Significant Imaging: I have reviewed and interpreted all pertinent imaging results/findings within the past 24  hours.

## 2017-06-17 NOTE — PROGRESS NOTES
"[facility] Hospital Medicine  Progress Note    Patient Name: Lucy Perez  MRN: [id number]  Patient Class: IP- Inpatient   Admission Date: 6/6/2017  Length of Stay: 10 days  Attending Physician: Anel De La Rosa MD  Primary Care Provider: Beverly Muniz MD    [facility] Medicine Team: [facility] Anel De La Rosa MD    Subjective:     Principal Problem:Partial symptomatic epilepsy with complex partial seizures, not intractable, without status epilepticus    HPI:   Lucy Perez is a 55 yo F with PMH of CVA in August and November with residual left sided weakness, CHF, Dm, HLD, HTN, sarcoid, Ra, CKD 3 presenting with aphasia, unresponsiveness and poor motor effort since 9 AM today. The patient's family reports that the patient has had an elevated BP to the 180s for the past week. They report that at times the patient "will be sleepy" and poorly responsive and usually improves within a few hours. However,  At about 2pm she was found to be staring up the ceiling and remained unresponsive.  The family attempted to feed the patient at lunch through her PEG tube but the patient vomited shortly thereafter.   Her baseline is described as dependent for activities of daily living, verbal and interactive with limited mobility due to left hemiparesis.   She had hemicranectomy for stroke treatment in 2016. She is currently on keppra and tegretol and per family was started post stroke for seizure prophylaxis. No history of seizure disorder per patient's daughter.        Hospital Course:  6/7 admission to [facility]  6/8 R PLED-> increased carbamazepine, levetiracetam  6/9 waxing/waning R PLED but no seizure  6/12: Patient came because of SZ. R PLEDS and slowing and no ictal activity.  6/13: MRI of the brain is stable. No acute strokes. Patient had severe tigh pain. Tegretol level 10.3. Serum Na OK. Enterbacter resistant to Ceftriaxine. Antibiotics were changed to Cipro. RLE tenderness. Dermatology " consultation left recommendations.  6/14:No events. On Cipro for enterobacter UTI.  BP low during the night. No BM yet,       Interval History: Stepped down from Neuro critical care.     Review of Systems   Unable to perform ROS: Mental status change     Objective:     Vital Signs (Most Recent):  Temp: 98.5 °F (36.9 °C) (06/16/17 1938)  Pulse: 68 (06/16/17 1947)  Resp: 18 (06/16/17 1938)  BP: (!) 183/81 (06/16/17 1938)  SpO2: (!) 94 % (06/16/17 1938) Vital Signs (24h Range):  Temp:  [97.2 °F (36.2 °C)-99.1 °F (37.3 °C)] 98.5 °F (36.9 °C)  Pulse:  [57-80] 68  Resp:  [12-20] 18  SpO2:  [93 %-99 %] 94 %  BP: (102-198)/(51-83) 183/81     Weight: 102.1 kg (225 lb 1.4 oz)  Body mass index is 38.64 kg/m².    Intake/Output Summary (Last 24 hours) at 06/16/17 2304  Last data filed at 06/16/17 1827   Gross per 24 hour   Intake              540 ml   Output              300 ml   Net              240 ml      Physical Exam   Constitutional: She appears well-developed and well-nourished.   Cardiovascular: Normal rate, regular rhythm and normal heart sounds.  Exam reveals no gallop and no friction rub.    No murmur heard.  Pulmonary/Chest: Effort normal and breath sounds normal. No respiratory distress. She has no wheezes. She has no rales.   Abdominal: Soft. Bowel sounds are normal. She exhibits no distension. There is no tenderness.   Musculoskeletal: Normal range of motion.   Neurological: She is alert.   Leftward gaze, intermittently follows commands   Skin: Skin is warm and dry.       Significant Labs:   CBC:   Recent Labs  Lab 06/15/17  0237 06/16/17  0504   WBC 8.21 6.20   HGB 9.7* 9.9*   HCT 29.4* 31.4*    170     CMP:   Recent Labs  Lab 06/15/17  0237 06/16/17  0504    142   K 4.7 4.8   * 112*   CO2 24 20*   GLU 88 85   BUN 28* 29*   CREATININE 1.4 1.6*   CALCIUM 8.2* 8.4*   PROT 6.2 6.0   ALBUMIN 2.8* 2.6*   BILITOT 0.4 0.2   ALKPHOS 103 101   AST 13 12   ALT 6* 6*   ANIONGAP 8 10   EGFRNONAA 41.4*  35.3*       Significant Imaging: I have reviewed and interpreted all pertinent imaging results/findings within the past 24 hours.    Assessment/Plan:      * Partial symptomatic epilepsy with complex partial seizures, not intractable, without status epilepticus    Step down from Neuro critical care. Pt still with episodes of staring and decreased responsiveness.   - Neurology consult   - 24 hr EEG   - continue keppra;  Carbamezapine, dc tegratol per recs           Type 2 diabetes mellitus with diabetic chronic kidney disease    Stable.. Cont detemir, ISS         Acute cystitis    Cont cipro           Hypertensive crisis                VTE Risk Mitigation         Ordered     heparin (porcine) injection 5,000 Units  Every 8 hours     Route:  Subcutaneous        06/06/17 2316     High Risk of VTE  Once      06/06/17 2316          Anel De La Rosa MD  Department of Hospital Medicine   [facility]

## 2017-06-17 NOTE — PT/OT/SLP PROGRESS
Speech Language Pathology  Treatment    Lucy Perez   MRN: [id number]   Admitting Diagnosis: Partial symptomatic epilepsy with complex partial seizures, not intractable, without status epilepticus    Diet recommendations: Solid Diet Level: Dental Soft  Liquid Diet Level: Nectar Thick   To achieve nectar thick liquid: 6 oz of any liquid to 1 pack of thickener  No ice cream, jello, or ice.  Avoid mixed consistencies (soup, cereal with milk, juicy fruits).  Choose puree/very soft items off tray.  Feed only when awake/alert*, No straws, HOB to 90 degrees, Small bites/sips and Assistance with thickening liquids    SLP Treatment Date: 06/17/17  Speech Start Time: 0757     Speech Stop Time: 0814     Speech Total (min): 17 min       TREATMENT BILLABLE MINUTES:  Treatment Swallowing Dysfunction 17    Has the patient been evaluated by SLP for swallowing? : Yes  Keep patient NPO?: No   General Precautions: Standard, fall, aspiration          Subjective:  Pt lethargic; woke with mod stimulation    Pain/Comfort  Pain Rating 1: 0/10  Pain Rating Post-Intervention 1: 0/10    Objective:     Spouse at  states pt continues to receive dental soft diet and is tolerating without difficulty; states tolerates dental soft diet at home. Pt with progressive arousal as assessment continued. Pt tolerated 4 spoonfuls nectar thick liquid, 1 cup sip nectar thick liquid, 2 bites pancake (dental soft), and  2 bite larissa cracker (solid) without s/s of airway compromise. Pt tolerated 4, 1/2-full spoonfuls thin liquid with consistent double swallow and throat clear x1, no change to phonation. Oral phase cb mild oral holding, slow bolus manipulation/AP transit, with good oral clearance. Pt's oral function is most appropriate for puree diet; however, spouse requesting pt remain on dental soft diet. Spouse demonstrated excellent knowledge and understanding of aspiration risk and diet modifications, choosing pureed food off tray; therefore,  okay for pt to be on dental soft diet. Skilled education provided on aspiration precautions and diet recommendations; spouse indep taughback thickener/liquid ratio and demonstrated excellent understanding. Whiteboard updated with diet recommendations/aspiration precautions. No further questions.                                   Assessment:  Lucy Perez is a 56 y.o. female with a medical diagnosis of Partial symptomatic epilepsy with complex partial seizures, not intractable, without status epilepticus and presents with oropharygeal dysphagia, cognitive-linguistic impairment. continued progress towards goals.      Discharge recommendations: Discharge Facility/Level Of Care Needs: home health speech therapy     Goals:    SLP Goals        Problem: SLP Goal    Goal Priority Disciplines Outcome   SLP Goal     SLP    Description:  Speech Language Pathology Goals  Goals expected to be met by 6/20    1. Pt will tolerate dental soft diet/nectar thick liquids without overt s/s of aspiration  2. Pt will tolerate thin liquid trials x10 without overt s/s of aspiration  3. Pt will complete OME x10 reps with min cues  4. Pt will complete recall tasks with 70% accuracy and min cues  5. Pt will follow 3 step commands with 75% accuracy and min cues  6. Pt will complete problem solving tasks with 80% accuracy and min cues                         Plan:   Patient to be seen Therapy Frequency: 5 x/week   Plan of Care expires: 07/12/17  Plan of Care reviewed with: patient, spouse  SLP Follow-up?: Yes             Keyana Cao M.A. CCC-SLP  Speech Language Pathologist  [phone]  6/17/2017

## 2017-06-17 NOTE — CONSULTS
Case further discussed with Daiana Gold and Roseanna.  Patient awareness is variable with improved reactivity to environment after 30 min from previous examination.  Cannot rule out previous seizure but is not having continuous clinical seizures.    Based on nystagmus and altered state, may actually be relatively toxic from the Tegretol.  Adequately covered for seizure with Keppra, so will d/c Tegretol now and monitor.    Jerry Galdamez MD, MPH  Division of Movement and Memory Disorders  [facility]

## 2017-06-17 NOTE — PROGRESS NOTES
"[facility] Hospital Medicine  Progress Note    Patient Name: Lucy Perez  MRN: [id number]  Patient Class: IP- Inpatient   Admission Date: 6/6/2017  Length of Stay: 11 days  Attending Physician: Anel De La Rosa MD  Primary Care Provider: Beverly Muniz MD    [facility] Medicine Team: [facility] Anel De La Rosa MD    Subjective:     Principal Problem:Partial symptomatic epilepsy with complex partial seizures, not intractable, without status epilepticus    HPI:   Lucy Perez is a 55 yo F with PMH of CVA in August and November with residual left sided weakness, CHF, Dm, HLD, HTN, sarcoid, Ra, CKD 3 presenting with aphasia, unresponsiveness and poor motor effort since 9 AM today. The patient's family reports that the patient has had an elevated BP to the 180s for the past week. They report that at times the patient "will be sleepy" and poorly responsive and usually improves within a few hours. However,  At about 2pm she was found to be staring up the ceiling and remained unresponsive.  The family attempted to feed the patient at lunch through her PEG tube but the patient vomited shortly thereafter.   Her baseline is described as dependent for activities of daily living, verbal and interactive with limited mobility due to left hemiparesis.   She had hemicranectomy for stroke treatment in 2016. She is currently on keppra and tegretol and per family was started post stroke for seizure prophylaxis. No history of seizure disorder per patient's daughter.        Hospital Course:  6/7 admission to [facility]  6/8 R PLED-> increased carbamazepine, levetiracetam  6/9 waxing/waning R PLED but no seizure  6/12: Patient came because of SZ. R PLEDS and slowing and no ictal activity.  6/13: MRI of the brain is stable. No acute strokes. Patient had severe tigh pain. Tegretol level 10.3. Serum Na OK. Enterbacter resistant to Ceftriaxine. Antibiotics were changed to Cipro. RLE tenderness. Dermatology " consultation left recommendations.  6/14:No events. On Cipro for enterobacter UTI.  BP low during the night. No BM yet,       Interval History: Stepped down from Neuro critical care. More alert, awake, and responsive today.     Review of Systems   Unable to perform ROS: Mental status change     Objective:     Vital Signs (Most Recent):  Temp: 99.1 °F (37.3 °C) (06/17/17 1600)  Pulse: 76 (06/17/17 1600)  Resp: 20 (06/17/17 1600)  BP: (!) 180/83 (06/17/17 1600)  SpO2: 95 % (06/17/17 1600) Vital Signs (24h Range):  Temp:  [96.7 °F (35.9 °C)-99.1 °F (37.3 °C)] 99.1 °F (37.3 °C)  Pulse:  [] 76  Resp:  [16-20] 20  SpO2:  [92 %-100 %] 95 %  BP: (147-183)/(67-90) 180/83     Weight: 102.1 kg (225 lb 1.4 oz)  Body mass index is 38.64 kg/m².    Intake/Output Summary (Last 24 hours) at 06/17/17 1701  Last data filed at 06/17/17 0920   Gross per 24 hour   Intake              360 ml   Output              500 ml   Net             -140 ml      Physical Exam   Constitutional: She appears well-developed and well-nourished.   Cardiovascular: Normal rate, regular rhythm and normal heart sounds.  Exam reveals no gallop and no friction rub.    No murmur heard.  Pulmonary/Chest: Effort normal and breath sounds normal. No respiratory distress. She has no wheezes. She has no rales.   Abdominal: Soft. Bowel sounds are normal. She exhibits no distension. There is no tenderness.   Musculoskeletal: Normal range of motion.   Neurological: She is alert.   Leftward gaze, intermittently follows commands   Skin: Skin is warm and dry.       Significant Labs:   CBC:     Recent Labs  Lab 06/16/17  0504 06/17/17  0423   WBC 6.20 5.47   HGB 9.9* 9.5*   HCT 31.4* 30.1*    230     CMP:     Recent Labs  Lab 06/16/17  0504 06/17/17  0423    141   K 4.8 4.8   * 112*   CO2 20* 21*   GLU 85 122*   BUN 29* 26*   CREATININE 1.6* 1.7*   CALCIUM 8.4* 8.3*   PROT 6.0 5.8*   ALBUMIN 2.6* 2.6*   BILITOT 0.2 0.2   ALKPHOS 101 92   AST 12 11    ALT 6* 7*   ANIONGAP 10 8   EGFRNONAA 35.3* 32.8*       Significant Imaging: I have reviewed and interpreted all pertinent imaging results/findings within the past 24 hours.    Assessment/Plan:      * Partial symptomatic epilepsy with complex partial seizures, not intractable, without status epilepticus    Step down from Neuro critical care. Pt still with episodes of staring and decreased responsiveness yesterday. Improved today. Per neurology - not concerning for continuous  clinical seizures.   - Neurology consulted. Appreciate recs   - 24 hr EEG pending  - continue keppra;  D/c Carbamezapine and dc tegratol per recs           Type 2 diabetes mellitus with diabetic chronic kidney disease    Stable. Cont detemir, ISS         Acute cystitis    Cont cipro for enterobacter infection. Day 6/7         Essential hypertension    Still above goal but has had some episodes of hypotension.   Cont labetalol 500 TID, hydralzine 75 tid, amlodipine 10 , lisinopril 10             VTE Risk Mitigation         Ordered     heparin (porcine) injection 5,000 Units  Every 8 hours     Route:  Subcutaneous        06/06/17 2316     High Risk of VTE  Once      06/06/17 2316          Anel De La Rosa MD  Department of Hospital Medicine   [facility]
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
## 2017-06-17 NOTE — ASSESSMENT & PLAN NOTE
Step down from Neuro critical care. Pt still with episodes of staring and decreased responsiveness yesterday. Improved today. Per neurology - not concerning for continuous  clinical seizures.   - Neurology consulted. Appreciate recs   - 24 hr EEG pending  - continue keppra;  D/c Carbamezapine and dc tegratol per recs

## 2017-06-17 NOTE — ASSESSMENT & PLAN NOTE
Still above goal but has had some episodes of hypotension.   Cont labetalol 500 TID, hydralzine 75 tid, amlodipine 10 , lisinopril 10

## 2017-06-17 NOTE — ASSESSMENT & PLAN NOTE
Step down from Neuro critical care. Pt still with episodes of staring and decreased responsiveness.   - Neurology consult   - 24 hr EEG   - continue keppra;  Carbamezapine, dc tegratol per recs

## 2017-06-17 NOTE — PROGRESS NOTES
"Progress Note      Reason for Consult: Fluctuating mental status. Possible seizure      SUBJECTIVE:     Interval History: (6/17/2017)  Mental status improved. Oriented to self and recognized her family member. Following simple commands, open and close her eyes.       History of Present Illness:  Lucy Perez is a 56 y.o. female with a PMHx of CVA in August and November with residual left sided weakness, Type 2 DM, HLD, HTN, sarcoid, RA, CKD 3 presenting with aphasia, unresponsiveness and poor motor effort since 9 AM today. The patient's family reports that the patient has had an elevated BP to the 180s for the past week. They report that at times the patient "will be sleepy" and poorly responsive and usually improves within a few hours. However,  At about 2pm she was found to be staring up the ceiling and remained unresponsive.  The family attempted to feed the patient at lunch through her PEG tube but the patient vomited shortly thereafter. Her baseline is described as dependent for activities of daily living, verbal and interactive with limited mobility due to left hemiparesis. She had hemicranectomy for stroke treatment in 2016. She is currently on keppra and tegretol and per family was started post stroke for seizure prophylaxis. No history of seizure disorder per patient's daughter. We were consulted for fluctuating mental status, starring spells and concerning for seizure.       Review of patient's allergies indicates:   Allergen Reactions    Vicodin [hydrocodone-acetaminophen] Rash       Past Medical History:   Diagnosis Date    Acute hypoxemic respiratory failure     Acute on chronic diastolic heart failure 11/7/2016    Anemia of chronic disease 6/10/2017    Anxiety     Asthma     Bipolar disorder     Bronchitis, acute     Cataract     CKD (chronic kidney disease) stage 3, GFR 30-59 ml/min     stage 3    Depression     Diabetes with neurologic complications     General anesthetics " "causing adverse effect in therapeutic use     Hemorrhagic cerebrovascular accident (CVA)     8[place] [facility]    Hyperlipidemia     Hypertension     Obesity     Partial symptomatic epilepsy with complex partial seizures, not intractable, without status epilepticus     Peripheral neuropathy     Pneumonia     Rheumatoid arthritis     Sarcoidosis     Sleep apnea     CPAP    Type II or unspecified type diabetes mellitus with renal manifestations, uncontrolled      Past Surgical History:   Procedure Laterality Date    breast reduction      BREAST SURGERY      breast reduction    COLONOSCOPY N/A 8/11/2016    Procedure: COLONOSCOPY;  Surgeon: Jerry Vilchis MD;  Location: [facility] ENDO ([address] [place] [address]);  Service: Endoscopy;  Laterality: N/A;  Patient reports difficulty awaking from anesthesia in the past.    HYSTERECTOMY      ROTATOR CUFF REPAIR Right July 9, 2014    right side    stent placed      in vertebral artery    TUBAL LIGATION       Family History   Problem Relation Age of Onset    Cancer Mother 55     Breast cancer    Diabetes Mother     Hypertension Mother     Heart disease Mother     Heart attack Mother     Stroke Sister     Hypertension Sister     Sleep apnea Sister     No Known Problems Daughter     No Known Problems Son     Bell's palsy Sister     Lupus Sister     Blindness Maternal Grandmother     Diabetes       "My entire family family has diabetes"    Stroke Maternal Aunt     Amblyopia Neg Hx     Cataracts Neg Hx     Glaucoma Neg Hx     Macular degeneration Neg Hx     Retinal detachment Neg Hx     Strabismus Neg Hx      Social History   Substance Use Topics    Smoking status: Never Smoker    Smokeless tobacco: Never Used    Alcohol use No      Comment: occasional wine cooler        Review of Systems:  Unable to assess     OBJECTIVE:     Vital Signs:  Temp:  [96.7 °F (35.9 °C)-98.6 °F (37 °C)]   Pulse:  [66-77]   Resp:  [16-20]   BP: " (168-182)/(84-90)   SpO2:  [92 %-100 %]     Physical Exam:  HEENT: Prior R crani.  Chest Heart RRR / Lungs Clear to auscultation  Abdomen: Soft nontender + BS  Extremities: OK distal pulses. Significant RLE atrophy.  Skin: UK    Neurology Exam:   MS: Oriented to self. Left gaze preference. Noted horizontal nystagmus. Her mental status was fluctuating.      CN: II-XII  No cranial nerve abnormalities noted. Noted horizontal nystagmus.   Motor: LUE   0/5 / RUE 5 /5  Tone normal bilaterally L- spasticity.              LLE  0 /5 /  RLE 5 /5  Tone normal bilaterally  RLE pain with movement.  Sensory: Responded to pain   DTR:  2+   Coordination : unable to assess   Gait: Not tested.  Meningeal signs: Absent    Laboratory:  CBC:     Recent Labs  Lab 06/17/17  0423   WBC 5.47   RBC 3.20*   HGB 9.5*   HCT 30.1*      MCV 94   MCH 29.7   MCHC 31.6*     CMP:     Recent Labs  Lab 06/17/17  0423   *   CALCIUM 8.3*   ALBUMIN 2.6*   PROT 5.8*      K 4.8   CO2 21*   *   BUN 26*   CREATININE 1.7*   ALKPHOS 92   ALT 7*   AST 11   BILITOT 0.2     No results for input(s): COLORU, CLARITYU, SPECGRAV, PHUR, PROTEINUA, GLUCOSEU, BILIRUBINCON, BLOODU, WBCU, RBCU, BACTERIA, MUCUS, NITRITE, LEUKOCYTESUR, UROBILINOGEN, HYALINECASTS in the last 168 hours.    Diagnostic Results:  MRI Brain:  No acute intracranial abnormality.  Stable postoperative changes of a prior right cranioplasty for decompression of prior right hemorrhagic MCA territory infarct. Mild persistent edema without mass effect.  Left corona radiata hemorrhagic infarct, stable since recent CT but new from prior MRI 10/28/16.    ASSESSMENT/PLAN:     Lucy Perez is a 56 y.o. female with a PMHx of CVA in August and November with residual left sided weakness, Type 2 DM, HLD, HTN, sarcoid, RA, CKD 3 presenting with aphasia, unresponsiveness admitted to [facility]. Stepdown yesterday. We were consulted for fluctuating mental status and starring spells.       -- Partial Sz activity.- Possible NCSE - EEG tech informed to hook her up for EEG   -- R-MCA stroke with R-putaminal hemorrhagic transformation. S/P R decompressive hemicrani.  -- UTI with enterobacter.     Note:  Case further discussed with Daiana Gold and Roseanna yesterday.  Patient awareness is variable with improved reactivity to environment after 30 min from previous examination yesterday evening.  Cannot rule out previous seizure but is not having continuous clinical seizures.     Based on nystagmus and altered state, may actually be relatively toxic from the Tegretol.  Adequately covered for seizure with Keppra.     Plan:     -- Will follow  EEG   -- DC carbamazepine 400 mg TID  -- On Keppra 2000 mg BID   -- Case discussed with Dr Galdamez   -- Will follow        Mina Gandara MD  Neurology Resident   [facility] [address] [place] [address]  Pager: [phone]

## 2017-06-17 NOTE — SUBJECTIVE & OBJECTIVE
Interval History: Stepped down from Neuro critical care.     Review of Systems   Unable to perform ROS: Mental status change     Objective:     Vital Signs (Most Recent):  Temp: 98.5 °F (36.9 °C) (06/16/17 1938)  Pulse: 68 (06/16/17 1947)  Resp: 18 (06/16/17 1938)  BP: (!) 183/81 (06/16/17 1938)  SpO2: (!) 94 % (06/16/17 1938) Vital Signs (24h Range):  Temp:  [97.2 °F (36.2 °C)-99.1 °F (37.3 °C)] 98.5 °F (36.9 °C)  Pulse:  [57-80] 68  Resp:  [12-20] 18  SpO2:  [93 %-99 %] 94 %  BP: (102-198)/(51-83) 183/81     Weight: 102.1 kg (225 lb 1.4 oz)  Body mass index is 38.64 kg/m².    Intake/Output Summary (Last 24 hours) at 06/16/17 2304  Last data filed at 06/16/17 1827   Gross per 24 hour   Intake              540 ml   Output              300 ml   Net              240 ml      Physical Exam   Constitutional: She appears well-developed and well-nourished.   Cardiovascular: Normal rate, regular rhythm and normal heart sounds.  Exam reveals no gallop and no friction rub.    No murmur heard.  Pulmonary/Chest: Effort normal and breath sounds normal. No respiratory distress. She has no wheezes. She has no rales.   Abdominal: Soft. Bowel sounds are normal. She exhibits no distension. There is no tenderness.   Musculoskeletal: Normal range of motion.   Neurological: She is alert.   Leftward gaze, intermittently follows commands   Skin: Skin is warm and dry.       Significant Labs:   CBC:   Recent Labs  Lab 06/15/17  0237 06/16/17  0504   WBC 8.21 6.20   HGB 9.7* 9.9*   HCT 29.4* 31.4*    170     CMP:   Recent Labs  Lab 06/15/17  0237 06/16/17  0504    142   K 4.7 4.8   * 112*   CO2 24 20*   GLU 88 85   BUN 28* 29*   CREATININE 1.4 1.6*   CALCIUM 8.2* 8.4*   PROT 6.2 6.0   ALBUMIN 2.8* 2.6*   BILITOT 0.4 0.2   ALKPHOS 103 101   AST 13 12   ALT 6* 6*   ANIONGAP 8 10   EGFRNONAA 41.4* 35.3*       Significant Imaging: I have reviewed and interpreted all pertinent imaging results/findings within the past 24  hours.

## 2017-06-17 NOTE — HOSPITAL COURSE
"6/7 admission to [facility]  6/8 R PLED-> increased carbamazepine, levetiracetam  6/9 waxing/waning R PLED but no seizure  6/12: Patient came because of SZ. R PLEDS and slowing and no ictal activity.  6/13: MRI of the brain is stable. No acute strokes. Patient had severe tigh pain. Tegretol level 10.3. Serum Na OK. Enterbacter resistant to Ceftriaxine. Antibiotics were changed to Cipro. RLE tenderness. Dermatology consultation left recommendations.  6/14:No events. On Cipro for enterobacter UTI.  BP low during the night. No BM yet,    Pt stepped down from Neuro critical care on 6/16/2017. Neurology consulted at that time for continued episodes of decreased responsiveness and leftward gaze.  Neurology discussed case with  Daiana Gold and Roseanna (epilepsy).  Per note: "Patient awareness is variable with improved reactivity to environment after 30 min from previous examination yesterday evening.  Cannot rule out previous seizure but is not having continuous clinical seizures. Based on nystagmus and altered state, may actually be relatively toxic from the Tegretol.  Adequately covered for seizure with Keppra." Tegretol and Carbamezapine discontinued per recs. Pt improved to baseline mental status. Hypoxemic, so added IV lasix and stopped fluids.   "

## 2017-06-17 NOTE — PLAN OF CARE
Problem: SLP Goal  Goal: SLP Goal  Speech Language Pathology Goals  Goals expected to be met by 6/20    1. Pt will tolerate dental soft diet/nectar thick liquids without overt s/s of aspiration  2. Pt will tolerate thin liquid trials x10 without overt s/s of aspiration  3. Pt will complete OME x10 reps with min cues  4. Pt will complete recall tasks with 70% accuracy and min cues  5. Pt will follow 3 step commands with 75% accuracy and min cues  6. Pt will complete problem solving tasks with 80% accuracy and min cues         Pt with continued progress towards goals.    Keyana Cao M.A. CCC-SLP  Speech Language Pathologist  [phone]  6/17/2017

## 2017-06-17 NOTE — HPI
" Lucy Perez is a 57 yo F with PMH of CVA in August and November with residual left sided weakness, CHF, Dm, HLD, HTN, sarcoid, Ra, CKD 3 presenting with aphasia, unresponsiveness and poor motor effort since 9 AM today. The patient's family reports that the patient has had an elevated BP to the 180s for the past week. They report that at times the patient "will be sleepy" and poorly responsive and usually improves within a few hours. However,  At about 2pm she was found to be staring up the ceiling and remained unresponsive.  The family attempted to feed the patient at lunch through her PEG tube but the patient vomited shortly thereafter.   Her baseline is described as dependent for activities of daily living, verbal and interactive with limited mobility due to left hemiparesis.   She had hemicranectomy for stroke treatment in 2016. She is currently on keppra and tegretol and per family was started post stroke for seizure prophylaxis. No history of seizure disorder per patient's daughter.      "

## 2017-06-18 LAB
ALBUMIN SERPL BCP-MCNC: 2.6 G/DL
ALP SERPL-CCNC: 88 U/L
ALT SERPL W/O P-5'-P-CCNC: 10 U/L
ANION GAP SERPL CALC-SCNC: 8 MMOL/L
AST SERPL-CCNC: 21 U/L
BASOPHILS # BLD AUTO: 0.02 K/UL
BASOPHILS NFR BLD: 0.4 %
BILIRUB SERPL-MCNC: 0.3 MG/DL
BUN SERPL-MCNC: 24 MG/DL
CALCIUM SERPL-MCNC: 8.3 MG/DL
CHLORIDE SERPL-SCNC: 112 MMOL/L
CO2 SERPL-SCNC: 20 MMOL/L
CREAT SERPL-MCNC: 1.5 MG/DL
DIFFERENTIAL METHOD: ABNORMAL
EOSINOPHIL # BLD AUTO: 0.3 K/UL
EOSINOPHIL NFR BLD: 4.8 %
ERYTHROCYTE [DISTWIDTH] IN BLOOD BY AUTOMATED COUNT: 15.9 %
EST. GFR  (AFRICAN AMERICAN): 44 ML/MIN/1.73 M^2
EST. GFR  (NON AFRICAN AMERICAN): 38.1 ML/MIN/1.73 M^2
GLUCOSE SERPL-MCNC: 89 MG/DL
HCT VFR BLD AUTO: 29.5 %
HGB BLD-MCNC: 9.4 G/DL
LYMPHOCYTES # BLD AUTO: 0.9 K/UL
LYMPHOCYTES NFR BLD: 17.4 %
MAGNESIUM SERPL-MCNC: 2.1 MG/DL
MCH RBC QN AUTO: 29.8 PG
MCHC RBC AUTO-ENTMCNC: 31.9 %
MCV RBC AUTO: 94 FL
MONOCYTES # BLD AUTO: 0.6 K/UL
MONOCYTES NFR BLD: 11.5 %
NEUTROPHILS # BLD AUTO: 3.4 K/UL
NEUTROPHILS NFR BLD: 65.3 %
PHOSPHATE SERPL-MCNC: 2.5 MG/DL
PLATELET # BLD AUTO: 200 K/UL
PMV BLD AUTO: 10.1 FL
POCT GLUCOSE: 110 MG/DL (ref 70–110)
POCT GLUCOSE: 114 MG/DL (ref 70–110)
POCT GLUCOSE: 140 MG/DL (ref 70–110)
POCT GLUCOSE: 169 MG/DL (ref 70–110)
POTASSIUM SERPL-SCNC: 4.8 MMOL/L
PROT SERPL-MCNC: 5.8 G/DL
RBC # BLD AUTO: 3.15 M/UL
SODIUM SERPL-SCNC: 140 MMOL/L
WBC # BLD AUTO: 5.23 K/UL

## 2017-06-18 PROCEDURE — 25000003 PHARM REV CODE 250: Performed by: NURSE PRACTITIONER

## 2017-06-18 PROCEDURE — 80053 COMPREHEN METABOLIC PANEL: CPT

## 2017-06-18 PROCEDURE — 85025 COMPLETE CBC W/AUTO DIFF WBC: CPT

## 2017-06-18 PROCEDURE — 25000003 PHARM REV CODE 250: Performed by: HOSPITALIST

## 2017-06-18 PROCEDURE — 95813 EEG EXTND MNTR 61-119 MIN: CPT | Mod: 26,,, | Performed by: PSYCHIATRY & NEUROLOGY

## 2017-06-18 PROCEDURE — 99232 SBSQ HOSP IP/OBS MODERATE 35: CPT | Mod: ,,, | Performed by: HOSPITALIST

## 2017-06-18 PROCEDURE — 63600175 PHARM REV CODE 636 W HCPCS: Performed by: HOSPITALIST

## 2017-06-18 PROCEDURE — 11000001 HC ACUTE MED/SURG PRIVATE ROOM

## 2017-06-18 PROCEDURE — 95812 EEG 41-60 MINUTES: CPT

## 2017-06-18 PROCEDURE — 36415 COLL VENOUS BLD VENIPUNCTURE: CPT

## 2017-06-18 PROCEDURE — 25000003 PHARM REV CODE 250: Performed by: STUDENT IN AN ORGANIZED HEALTH CARE EDUCATION/TRAINING PROGRAM

## 2017-06-18 PROCEDURE — 25000003 PHARM REV CODE 250: Performed by: PSYCHIATRY & NEUROLOGY

## 2017-06-18 PROCEDURE — 94761 N-INVAS EAR/PLS OXIMETRY MLT: CPT

## 2017-06-18 PROCEDURE — 94640 AIRWAY INHALATION TREATMENT: CPT

## 2017-06-18 PROCEDURE — 25000003 PHARM REV CODE 250: Performed by: PHYSICIAN ASSISTANT

## 2017-06-18 PROCEDURE — 83735 ASSAY OF MAGNESIUM: CPT

## 2017-06-18 PROCEDURE — 25000242 PHARM REV CODE 250 ALT 637 W/ HCPCS: Performed by: PHYSICIAN ASSISTANT

## 2017-06-18 PROCEDURE — 84100 ASSAY OF PHOSPHORUS: CPT

## 2017-06-18 PROCEDURE — 27000221 HC OXYGEN, UP TO 24 HOURS

## 2017-06-18 RX ORDER — ONDANSETRON 4 MG/1
4 TABLET, FILM COATED ORAL EVERY 6 HOURS PRN
Status: DISCONTINUED | OUTPATIENT
Start: 2017-06-18 | End: 2017-06-18

## 2017-06-18 RX ORDER — HYDRALAZINE HYDROCHLORIDE 50 MG/1
100 TABLET, FILM COATED ORAL EVERY 8 HOURS
Status: DISCONTINUED | OUTPATIENT
Start: 2017-06-18 | End: 2017-06-18

## 2017-06-18 RX ORDER — ONDANSETRON 2 MG/ML
4 INJECTION INTRAMUSCULAR; INTRAVENOUS EVERY 6 HOURS PRN
Status: DISCONTINUED | OUTPATIENT
Start: 2017-06-18 | End: 2017-06-22 | Stop reason: HOSPADM

## 2017-06-18 RX ADMIN — HYDRALAZINE HYDROCHLORIDE 75 MG: 50 TABLET ORAL at 04:06

## 2017-06-18 RX ADMIN — ASPIRIN 81 MG CHEWABLE TABLET 81 MG: 81 TABLET CHEWABLE at 09:06

## 2017-06-18 RX ADMIN — ALBUTEROL SULFATE 2.5 MG: 2.5 SOLUTION RESPIRATORY (INHALATION) at 03:06

## 2017-06-18 RX ADMIN — HEPARIN SODIUM 5000 UNITS: 5000 INJECTION, SOLUTION INTRAVENOUS; SUBCUTANEOUS at 04:06

## 2017-06-18 RX ADMIN — ALBUTEROL SULFATE 2.5 MG: 2.5 SOLUTION RESPIRATORY (INHALATION) at 12:06

## 2017-06-18 RX ADMIN — GABAPENTIN 300 MG: 300 CAPSULE ORAL at 09:06

## 2017-06-18 RX ADMIN — STANDARDIZED SENNA CONCENTRATE AND DOCUSATE SODIUM 1 TABLET: 8.6; 5 TABLET, FILM COATED ORAL at 09:06

## 2017-06-18 RX ADMIN — LISINOPRIL 10 MG: 10 TABLET ORAL at 09:06

## 2017-06-18 RX ADMIN — LEVETIRACETAM 2000 MG: 100 SOLUTION ORAL at 09:06

## 2017-06-18 RX ADMIN — LABETALOL HCL 500 MG: 200 TABLET, FILM COATED ORAL at 04:06

## 2017-06-18 RX ADMIN — Medication 3 ML: at 04:06

## 2017-06-18 RX ADMIN — CIPROFLOXACIN HYDROCHLORIDE 500 MG: 500 TABLET, FILM COATED ORAL at 09:06

## 2017-06-18 RX ADMIN — Medication 3 ML: at 09:06

## 2017-06-18 RX ADMIN — ONDANSETRON 4 MG: 2 INJECTION INTRAMUSCULAR; INTRAVENOUS at 06:06

## 2017-06-18 RX ADMIN — AMLODIPINE BESYLATE 10 MG: 10 TABLET ORAL at 09:06

## 2017-06-18 RX ADMIN — HEPARIN SODIUM 5000 UNITS: 5000 INJECTION, SOLUTION INTRAVENOUS; SUBCUTANEOUS at 02:06

## 2017-06-18 RX ADMIN — SODIUM CHLORIDE: 0.9 INJECTION, SOLUTION INTRAVENOUS at 06:06

## 2017-06-18 RX ADMIN — POLYETHYLENE GLYCOL 3350 17 G: 17 POWDER, FOR SOLUTION ORAL at 09:06

## 2017-06-18 RX ADMIN — MICONAZOLE NITRATE: 20 OINTMENT TOPICAL at 04:06

## 2017-06-18 RX ADMIN — Medication 3 ML: at 02:06

## 2017-06-18 RX ADMIN — HEPARIN SODIUM 5000 UNITS: 5000 INJECTION, SOLUTION INTRAVENOUS; SUBCUTANEOUS at 09:06

## 2017-06-18 RX ADMIN — LABETALOL HCL 500 MG: 200 TABLET, FILM COATED ORAL at 02:06

## 2017-06-18 RX ADMIN — HYDRALAZINE HYDROCHLORIDE 75 MG: 50 TABLET ORAL at 09:06

## 2017-06-18 RX ADMIN — HYDRALAZINE HYDROCHLORIDE 75 MG: 50 TABLET ORAL at 02:06

## 2017-06-18 RX ADMIN — LABETALOL HCL 500 MG: 200 TABLET, FILM COATED ORAL at 09:06

## 2017-06-18 RX ADMIN — ALBUTEROL SULFATE 2.5 MG: 2.5 SOLUTION RESPIRATORY (INHALATION) at 07:06

## 2017-06-18 RX ADMIN — ALBUTEROL SULFATE 2.5 MG: 2.5 SOLUTION RESPIRATORY (INHALATION) at 08:06

## 2017-06-18 RX ADMIN — FAMOTIDINE 20 MG: 20 TABLET, FILM COATED ORAL at 04:06

## 2017-06-18 RX ADMIN — ALBUTEROL SULFATE 2.5 MG: 2.5 SOLUTION RESPIRATORY (INHALATION) at 11:06

## 2017-06-18 RX ADMIN — INSULIN ASPART 2 UNITS: 100 INJECTION, SOLUTION INTRAVENOUS; SUBCUTANEOUS at 12:06

## 2017-06-18 NOTE — ASSESSMENT & PLAN NOTE
Step down from Neuro critical care. Pt still with episodes of staring and decreased responsiveness 6/16. Continues to improve. Per neurology - not concerning for continuous  clinical seizures. Possible related to tegretol.   - Neurology consulted and signed off. Appreciate recs   - EEG abnormal but negative for seizure   - continue keppra;  D/c Carbamezapine and dc tegratol per recs   - Pt close to baseline per family - possible dc tomorrow

## 2017-06-18 NOTE — ASSESSMENT & PLAN NOTE
Still above goal   Cont labetalol 500 TID, increase hydralzine to 100 tid  Cont amlodipine 10 , lisinopril 10

## 2017-06-18 NOTE — PLAN OF CARE
Problem: Fall Risk (Adult)  Goal: Absence of Falls  Patient will demonstrate the desired outcomes by discharge/transition of care.   Outcome: Ongoing (interventions implemented as appropriate)  Absence of falls noted at present. SR up x's 3. Fall risk protocol IP.  at bedside and door ajar at all times. Call bell within reach. Will cont to monitor.

## 2017-06-18 NOTE — SUBJECTIVE & OBJECTIVE
Interval History: Improved this AM. No complaints. Close to baseline per family.     Review of Systems   Unable to perform ROS: Mental status change     Objective:     Vital Signs (Most Recent):  Temp: 99.2 °F (37.3 °C) (06/18/17 1200)  Pulse: 74 (06/18/17 1200)  Resp: 16 (06/18/17 1200)  BP: (!) 185/76 (06/18/17 1200)  SpO2: 97 % (06/18/17 1200) Vital Signs (24h Range):  Temp:  [98.2 °F (36.8 °C)-99.2 °F (37.3 °C)] 99.2 °F (37.3 °C)  Pulse:  [] 74  Resp:  [12-20] 16  SpO2:  [93 %-99 %] 97 %  BP: (133-185)/(60-83) 185/76     Weight: 102.1 kg (225 lb 1.4 oz)  Body mass index is 38.64 kg/m².    Intake/Output Summary (Last 24 hours) at 06/18/17 1526  Last data filed at 06/18/17 1000   Gross per 24 hour   Intake              480 ml   Output              900 ml   Net             -420 ml      Physical Exam   Constitutional: She appears well-developed and well-nourished.   Cardiovascular: Normal rate, regular rhythm and normal heart sounds.  Exam reveals no gallop and no friction rub.    No murmur heard.  Pulmonary/Chest: Effort normal and breath sounds normal. No respiratory distress. She has no wheezes. She has no rales.   Abdominal: Soft. Bowel sounds are normal. She exhibits no distension. There is no tenderness.   Musculoskeletal: Normal range of motion.   Neurological: She is alert.   Follows commands. AO x 2 - person and place    Skin: Skin is warm and dry.       Significant Labs:   CBC:     Recent Labs  Lab 06/17/17  0423 06/18/17  0402   WBC 5.47 5.23   HGB 9.5* 9.4*   HCT 30.1* 29.5*    200     CMP:     Recent Labs  Lab 06/17/17  0423 06/18/17  0402    140   K 4.8 4.8   * 112*   CO2 21* 20*   * 89   BUN 26* 24*   CREATININE 1.7* 1.5*   CALCIUM 8.3* 8.3*   PROT 5.8* 5.8*   ALBUMIN 2.6* 2.6*   BILITOT 0.2 0.3   ALKPHOS 92 88   AST 11 21   ALT 7* 10   ANIONGAP 8 8   EGFRNONAA 32.8* 38.1*       Significant Imaging: I have reviewed and interpreted all pertinent imaging results/findings  within the past 24 hours.

## 2017-06-18 NOTE — PROCEDURES
EXTENDED  ELECTROENCEPHALOGRAM  REPORT    Lucy Perez  [id number]  1960    DATE OF SERVICE: 6/18/17    DATE OF ADMISSION: 6/6/2017  8:27 PM    ADMITTING/REQUESTING PROVIDER: Josue Berry MD    REASON FOR CONSULT: 55yo F with multiple medical co-morbidities, including seizures s/p prior R MCA stroke admitted with AMS. Concern for seizures.    MEDICATIONS:   Current Facility-Administered Medications   Medication    0.9%  NaCl infusion (for blood administration)    0.9%  NaCl infusion (for blood administration)    0.9%  NaCl infusion    acetaminophen tablet 650 mg    albuterol nebulizer solution 2.5 mg    amlodipine tablet 10 mg    aspirin chewable tablet 81 mg    ciprofloxacin HCl tablet 500 mg    dextrose 50% injection 12.5 g    famotidine tablet 20 mg    gabapentin capsule 300 mg    glucagon (human recombinant) injection 1 mg    heparin (porcine) injection 5,000 Units    hydrALAZINE tablet 75 mg    insulin aspart pen 1-10 Units    labetalol tablet 500 mg    levetiracetam oral soln Soln 2,000 mg    lisinopril tablet 10 mg    magnesium oxide tablet 800 mg    magnesium oxide tablet 800 mg    miconazole nitrate 2% ointment    polyethylene glycol packet 17 g    potassium chloride 10% solution 40 mEq    potassium chloride 10% solution 40 mEq    potassium chloride 10% solution 60 mEq    potassium, sodium phosphates 280-160-250 mg packet 2 packet    potassium, sodium phosphates 280-160-250 mg packet 2 packet    potassium, sodium phosphates 280-160-250 mg packet 2 packet    senna-docusate 8.6-50 mg per tablet 1 tablet    sodium chloride 0.9% flush 3 mL     METHODOLOGY   Electroencephalographic (EEG) recording is with electrodes placed according to the International 10-20 placement system.  Thirty two (32) channels of digital signal (sampling rate of 512/sec) including T1 and T2 was simultaneously recorded from the scalp and may include  EKG, EMG, and/or eye monitors.   Recording band pass was 0.1 to 512 hz.  Digital video recording of the patient is simultaneously recorded with the EEG.  The patient is instructed report clinical symptoms which may occur during the recording session.  EEG and video recording is stored and archived in digital format.  Activation procedures which include photic stimulation, hyperventilation and instructing patients to perform simple task are done in selected patients.   The EEG is displayed on a monitor screen and can be reviewed using different montages.  Computer assisted analysis is employed to detect spike and electrographic seizure activity.   The entire record is submitted for computer analysis.  The entire recording is visually reviewed and the times identified by computer analysis as being spikes or seizures are reviewed again.  Compresses spectral analysis (CSA) is also performed on the activity recorded from each individual channel.  This is displayed as a power display of frequencies from 0 to 30 Hz over time.   The CSA is reviewed looking for asymmetries in power between homologous areas of the scalp and then compared with the original EEG recording.     upad software was also utilized in the review of this study.  This software suite analyzes the EEG recording in multiple domains.  Coherence and rhythmicity is computed to identify EEG sections which may contain organized seizures.  Each channel undergoes analysis to detect presence of spike and sharp waves which have special and morphological characteristic of epileptic activity.  The routine EEG recording is converted from spacial into frequency domain.  This is then displayed comparing homologous areas to identify areas of significant asymmetry.  Algorithm to identify non-cortically generated artifact is used to separate eye movement, EMG and other artifact from the EEG.      RECORDING TIMES  Start on 6/18/17, 09:21  Stop on 6/18/17, 10:43    A total of 1 hour and 20 minutes of EEG  recording was obtained.    EEG FINGINGS  Background activity:   The background rhythm was characterized by generalized delta-theta (4-6 Hz) activity  No posterior dominant rhythm seen.   Symmetry and continuity: the background was continuous; focal right sided slowing (delta > theta) is noted intermittently.     Sleep:  Not seen.     Abnormal activity:   No epileptiform discharges, periodic discharges or electrographic seizures were seen.     IMPRESSION:   This is an abnormal C-EEG due to:  1) intermittent focal right sided slowing seen, suggestive of underlying structural lesion  2) moderate to severe generalized slowing seen, suggestive of moderate to severe diffuse or multifocal cerebral dysfunction.    No epileptiform activity or electrographic seizures seen.    CLINICAL CORRELATION IS RECOMMENDED    Charlotte Gold MD, FRANCO(), FACNS.  Neurology-Epilepsy.

## 2017-06-18 NOTE — PROGRESS NOTES
"[facility] Hospital Medicine  Progress Note    Patient Name: Lucy Perez  MRN: [id number]  Patient Class: IP- Inpatient   Admission Date: 6/6/2017  Length of Stay: 12 days  Attending Physician: Anel De La Rosa MD  Primary Care Provider: Beverly Muniz MD    [facility] Medicine Team: [facility] MED S Anel De La Rosa MD    Subjective:     Principal Problem:Partial symptomatic epilepsy with complex partial seizures, not intractable, without status epilepticus    HPI:   Lucy Perez is a 55 yo F with PMH of CVA in August and November with residual left sided weakness, CHF, Dm, HLD, HTN, sarcoid, Ra, CKD 3 presenting with aphasia, unresponsiveness and poor motor effort since 9 AM today. The patient's family reports that the patient has had an elevated BP to the 180s for the past week. They report that at times the patient "will be sleepy" and poorly responsive and usually improves within a few hours. However,  At about 2pm she was found to be staring up the ceiling and remained unresponsive.  The family attempted to feed the patient at lunch through her PEG tube but the patient vomited shortly thereafter.   Her baseline is described as dependent for activities of daily living, verbal and interactive with limited mobility due to left hemiparesis.   She had hemicranectomy for stroke treatment in 2016. She is currently on keppra and tegretol and per family was started post stroke for seizure prophylaxis. No history of seizure disorder per patient's daughter.        Hospital Course:  6/7 admission to [facility]  6/8 R PLED-> increased carbamazepine, levetiracetam  6/9 waxing/waning R PLED but no seizure  6/12: Patient came because of SZ. R PLEDS and slowing and no ictal activity.  6/13: MRI of the brain is stable. No acute strokes. Patient had severe tigh pain. Tegretol level 10.3. Serum Na OK. Enterbacter resistant to Ceftriaxine. Antibiotics were changed to Cipro. RLE tenderness. Dermatology " "consultation left recommendations.  6/14:No events. On Cipro for enterobacter UTI.  BP low during the night. No BM yet,    Pt stepped down from Neuro critical care on 6/16/2017. Neurology consulted at that time for continued episodes of decreased responsiveness and leftward gaze.  Neurology discussed case with  aDiana Gold and Roseanna (epilepsy).  Per note: "Patient awareness is variable with improved reactivity to environment after 30 min from previous examination yesterday evening.  Cannot rule out previous seizure but is not having continuous clinical seizures. Based on nystagmus and altered state, may actually be relatively toxic from the Tegretol.  Adequately covered for seizure with Keppra." Tegretol and Carbamezapine discontinued per recs. Pt improved to baseline mental status.     Interval History: Improved this AM. No complaints. Close to baseline per family.     Review of Systems   Unable to perform ROS: Mental status change     Objective:     Vital Signs (Most Recent):  Temp: 99.2 °F (37.3 °C) (06/18/17 1200)  Pulse: 74 (06/18/17 1200)  Resp: 16 (06/18/17 1200)  BP: (!) 185/76 (06/18/17 1200)  SpO2: 97 % (06/18/17 1200) Vital Signs (24h Range):  Temp:  [98.2 °F (36.8 °C)-99.2 °F (37.3 °C)] 99.2 °F (37.3 °C)  Pulse:  [] 74  Resp:  [12-20] 16  SpO2:  [93 %-99 %] 97 %  BP: (133-185)/(60-83) 185/76     Weight: 102.1 kg (225 lb 1.4 oz)  Body mass index is 38.64 kg/m².    Intake/Output Summary (Last 24 hours) at 06/18/17 1526  Last data filed at 06/18/17 1000   Gross per 24 hour   Intake              480 ml   Output              900 ml   Net             -420 ml      Physical Exam   Constitutional: She appears well-developed and well-nourished.   Cardiovascular: Normal rate, regular rhythm and normal heart sounds.  Exam reveals no gallop and no friction rub.    No murmur heard.  Pulmonary/Chest: Effort normal and breath sounds normal. No respiratory distress. She has no wheezes. She has no rales. "   Abdominal: Soft. Bowel sounds are normal. She exhibits no distension. There is no tenderness.   Musculoskeletal: Normal range of motion.   Neurological: She is alert.   Follows commands. AO x 2 - person and place    Skin: Skin is warm and dry.       Significant Labs:   CBC:     Recent Labs  Lab 06/17/17  0423 06/18/17  0402   WBC 5.47 5.23   HGB 9.5* 9.4*   HCT 30.1* 29.5*    200     CMP:     Recent Labs  Lab 06/17/17  0423 06/18/17  0402    140   K 4.8 4.8   * 112*   CO2 21* 20*   * 89   BUN 26* 24*   CREATININE 1.7* 1.5*   CALCIUM 8.3* 8.3*   PROT 5.8* 5.8*   ALBUMIN 2.6* 2.6*   BILITOT 0.2 0.3   ALKPHOS 92 88   AST 11 21   ALT 7* 10   ANIONGAP 8 8   EGFRNONAA 32.8* 38.1*       Significant Imaging: I have reviewed and interpreted all pertinent imaging results/findings within the past 24 hours.    Assessment/Plan:      * Partial symptomatic epilepsy with complex partial seizures, not intractable, without status epilepticus    Step down from Neuro critical care. Pt still with episodes of staring and decreased responsiveness 6/16. Continues to improve. Per neurology - not concerning for continuous  clinical seizures. Possible related to tegretol.   - Neurology consulted and signed off. 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 Cont detemir, ISS         Acute cystitis    Cont cipro for enterobacter infection. Day 7/7         Essential hypertension    Still above goal   Cont labetalol 500 TID, increase hydralzine to 100 tid  Cont amlodipine 10 , lisinopril 10             VTE Risk Mitigation         Ordered     heparin (porcine) injection 5,000 Units  Every 8 hours     Route:  Subcutaneous        06/06/17 2316     High Risk of VTE  Once      06/06/17 2316          Anel MEJIAS  MD Rj  Department of Hospital Medicine   [facility]

## 2017-06-19 LAB
ALBUMIN SERPL BCP-MCNC: 2.5 G/DL
ALLENS TEST: ABNORMAL
ALP SERPL-CCNC: 101 U/L
ALT SERPL W/O P-5'-P-CCNC: 13 U/L
ANION GAP SERPL CALC-SCNC: 6 MMOL/L
AST SERPL-CCNC: 21 U/L
BASOPHILS # BLD AUTO: 0.03 K/UL
BASOPHILS NFR BLD: 0.4 %
BILIRUB SERPL-MCNC: 0.2 MG/DL
BUN SERPL-MCNC: 21 MG/DL
CALCIUM SERPL-MCNC: 8.4 MG/DL
CHLORIDE SERPL-SCNC: 111 MMOL/L
CO2 SERPL-SCNC: 21 MMOL/L
CREAT SERPL-MCNC: 1.4 MG/DL
DELSYS: ABNORMAL
DIFFERENTIAL METHOD: ABNORMAL
EOSINOPHIL # BLD AUTO: 0.3 K/UL
EOSINOPHIL NFR BLD: 3.7 %
ERYTHROCYTE [DISTWIDTH] IN BLOOD BY AUTOMATED COUNT: 15.6 %
EST. GFR  (AFRICAN AMERICAN): 47.8 ML/MIN/1.73 M^2
EST. GFR  (NON AFRICAN AMERICAN): 41.4 ML/MIN/1.73 M^2
FIO2: 55
GLUCOSE SERPL-MCNC: 132 MG/DL
HCO3 UR-SCNC: 23.4 MMOL/L (ref 24–28)
HCT VFR BLD AUTO: 29.1 %
HGB BLD-MCNC: 9.4 G/DL
LYMPHOCYTES # BLD AUTO: 1.1 K/UL
LYMPHOCYTES NFR BLD: 12.5 %
MAGNESIUM SERPL-MCNC: 1.7 MG/DL
MCH RBC QN AUTO: 30.4 PG
MCHC RBC AUTO-ENTMCNC: 32.3 %
MCV RBC AUTO: 94 FL
MODE: ABNORMAL
MONOCYTES # BLD AUTO: 0.3 K/UL
MONOCYTES NFR BLD: 4 %
NEUTROPHILS # BLD AUTO: 6.7 K/UL
NEUTROPHILS NFR BLD: 79.2 %
PCO2 BLDA: 42.8 MMHG (ref 35–45)
PH SMN: 7.35 [PH] (ref 7.35–7.45)
PHOSPHATE SERPL-MCNC: 2.7 MG/DL
PLATELET # BLD AUTO: 194 K/UL
PMV BLD AUTO: 9.2 FL
PO2 BLDA: 119 MMHG (ref 80–100)
POC BE: -2 MMOL/L
POC SATURATED O2: 98 % (ref 95–100)
POC TCO2: 25 MMOL/L (ref 23–27)
POCT GLUCOSE: 160 MG/DL (ref 70–110)
POTASSIUM SERPL-SCNC: 4.9 MMOL/L
PROT SERPL-MCNC: 5.8 G/DL
RBC # BLD AUTO: 3.09 M/UL
SAMPLE: ABNORMAL
SITE: ABNORMAL
SODIUM SERPL-SCNC: 138 MMOL/L
SP02: 99
WBC # BLD AUTO: 8.41 K/UL

## 2017-06-19 PROCEDURE — 99900035 HC TECH TIME PER 15 MIN (STAT)

## 2017-06-19 PROCEDURE — 25000003 PHARM REV CODE 250: Performed by: PHYSICIAN ASSISTANT

## 2017-06-19 PROCEDURE — 25000003 PHARM REV CODE 250: Performed by: NURSE PRACTITIONER

## 2017-06-19 PROCEDURE — 82803 BLOOD GASES ANY COMBINATION: CPT

## 2017-06-19 PROCEDURE — 25000003 PHARM REV CODE 250: Performed by: HOSPITALIST

## 2017-06-19 PROCEDURE — 25000003 PHARM REV CODE 250: Performed by: PSYCHIATRY & NEUROLOGY

## 2017-06-19 PROCEDURE — 80053 COMPREHEN METABOLIC PANEL: CPT

## 2017-06-19 PROCEDURE — 92507 TX SP LANG VOICE COMM INDIV: CPT

## 2017-06-19 PROCEDURE — 92526 ORAL FUNCTION THERAPY: CPT

## 2017-06-19 PROCEDURE — 63600175 PHARM REV CODE 636 W HCPCS

## 2017-06-19 PROCEDURE — 85025 COMPLETE CBC W/AUTO DIFF WBC: CPT

## 2017-06-19 PROCEDURE — 99232 SBSQ HOSP IP/OBS MODERATE 35: CPT | Mod: ,,, | Performed by: HOSPITALIST

## 2017-06-19 PROCEDURE — 94761 N-INVAS EAR/PLS OXIMETRY MLT: CPT

## 2017-06-19 PROCEDURE — 36600 WITHDRAWAL OF ARTERIAL BLOOD: CPT

## 2017-06-19 PROCEDURE — 11000001 HC ACUTE MED/SURG PRIVATE ROOM

## 2017-06-19 PROCEDURE — 94640 AIRWAY INHALATION TREATMENT: CPT

## 2017-06-19 PROCEDURE — 27000221 HC OXYGEN, UP TO 24 HOURS

## 2017-06-19 PROCEDURE — 83735 ASSAY OF MAGNESIUM: CPT

## 2017-06-19 PROCEDURE — 25000003 PHARM REV CODE 250: Performed by: STUDENT IN AN ORGANIZED HEALTH CARE EDUCATION/TRAINING PROGRAM

## 2017-06-19 PROCEDURE — 25000242 PHARM REV CODE 250 ALT 637 W/ HCPCS: Performed by: PHYSICIAN ASSISTANT

## 2017-06-19 PROCEDURE — 25000242 PHARM REV CODE 250 ALT 637 W/ HCPCS: Performed by: HOSPITALIST

## 2017-06-19 PROCEDURE — 36415 COLL VENOUS BLD VENIPUNCTURE: CPT

## 2017-06-19 PROCEDURE — 84100 ASSAY OF PHOSPHORUS: CPT

## 2017-06-19 RX ORDER — FUROSEMIDE 40 MG/1
40 TABLET ORAL DAILY
Status: DISCONTINUED | OUTPATIENT
Start: 2017-06-21 | End: 2017-06-19

## 2017-06-19 RX ORDER — FUROSEMIDE 10 MG/ML
40 INJECTION INTRAMUSCULAR; INTRAVENOUS ONCE
Status: DISCONTINUED | OUTPATIENT
Start: 2017-06-19 | End: 2017-06-19

## 2017-06-19 RX ORDER — FUROSEMIDE 40 MG/1
40 TABLET ORAL DAILY
Status: DISCONTINUED | OUTPATIENT
Start: 2017-06-21 | End: 2017-06-22 | Stop reason: HOSPADM

## 2017-06-19 RX ORDER — FUROSEMIDE 10 MG/ML
40 INJECTION INTRAMUSCULAR; INTRAVENOUS DAILY
Status: COMPLETED | OUTPATIENT
Start: 2017-06-20 | End: 2017-06-20

## 2017-06-19 RX ORDER — FUROSEMIDE 40 MG/1
40 TABLET ORAL DAILY
Status: DISCONTINUED | OUTPATIENT
Start: 2017-06-20 | End: 2017-06-19

## 2017-06-19 RX ORDER — FUROSEMIDE 10 MG/ML
INJECTION INTRAMUSCULAR; INTRAVENOUS
Status: COMPLETED
Start: 2017-06-19 | End: 2017-06-19

## 2017-06-19 RX ORDER — IPRATROPIUM BROMIDE AND ALBUTEROL SULFATE 2.5; .5 MG/3ML; MG/3ML
3 SOLUTION RESPIRATORY (INHALATION) ONCE
Status: COMPLETED | OUTPATIENT
Start: 2017-06-19 | End: 2017-06-19

## 2017-06-19 RX ADMIN — ASPIRIN 81 MG CHEWABLE TABLET 81 MG: 81 TABLET CHEWABLE at 10:06

## 2017-06-19 RX ADMIN — LISINOPRIL 10 MG: 10 TABLET ORAL at 10:06

## 2017-06-19 RX ADMIN — LABETALOL HCL 500 MG: 200 TABLET, FILM COATED ORAL at 04:06

## 2017-06-19 RX ADMIN — HYDRALAZINE HYDROCHLORIDE 75 MG: 50 TABLET ORAL at 04:06

## 2017-06-19 RX ADMIN — MICONAZOLE NITRATE: 20 OINTMENT TOPICAL at 09:06

## 2017-06-19 RX ADMIN — LABETALOL HCL 500 MG: 200 TABLET, FILM COATED ORAL at 10:06

## 2017-06-19 RX ADMIN — ALBUTEROL SULFATE 2.5 MG: 2.5 SOLUTION RESPIRATORY (INHALATION) at 09:06

## 2017-06-19 RX ADMIN — HEPARIN SODIUM 5000 UNITS: 5000 INJECTION, SOLUTION INTRAVENOUS; SUBCUTANEOUS at 10:06

## 2017-06-19 RX ADMIN — STANDARDIZED SENNA CONCENTRATE AND DOCUSATE SODIUM 1 TABLET: 8.6; 5 TABLET, FILM COATED ORAL at 10:06

## 2017-06-19 RX ADMIN — ALBUTEROL SULFATE 2.5 MG: 2.5 SOLUTION RESPIRATORY (INHALATION) at 07:06

## 2017-06-19 RX ADMIN — HYDRALAZINE HYDROCHLORIDE 75 MG: 50 TABLET ORAL at 10:06

## 2017-06-19 RX ADMIN — POLYETHYLENE GLYCOL 3350 17 G: 17 POWDER, FOR SOLUTION ORAL at 09:06

## 2017-06-19 RX ADMIN — HEPARIN SODIUM 5000 UNITS: 5000 INJECTION, SOLUTION INTRAVENOUS; SUBCUTANEOUS at 05:06

## 2017-06-19 RX ADMIN — ALBUTEROL SULFATE 2.5 MG: 2.5 SOLUTION RESPIRATORY (INHALATION) at 12:06

## 2017-06-19 RX ADMIN — HYDRALAZINE HYDROCHLORIDE 75 MG: 50 TABLET ORAL at 05:06

## 2017-06-19 RX ADMIN — ALBUTEROL SULFATE 2.5 MG: 2.5 SOLUTION RESPIRATORY (INHALATION) at 04:06

## 2017-06-19 RX ADMIN — LEVETIRACETAM 2000 MG: 100 SOLUTION ORAL at 10:06

## 2017-06-19 RX ADMIN — GABAPENTIN 300 MG: 300 CAPSULE ORAL at 10:06

## 2017-06-19 RX ADMIN — FUROSEMIDE: 10 INJECTION, SOLUTION INTRAMUSCULAR; INTRAVENOUS at 08:06

## 2017-06-19 RX ADMIN — IPRATROPIUM BROMIDE AND ALBUTEROL SULFATE 3 ML: .5; 3 SOLUTION RESPIRATORY (INHALATION) at 09:06

## 2017-06-19 RX ADMIN — AMLODIPINE BESYLATE 10 MG: 10 TABLET ORAL at 10:06

## 2017-06-19 RX ADMIN — Medication 3 ML: at 05:06

## 2017-06-19 RX ADMIN — HEPARIN SODIUM 5000 UNITS: 5000 INJECTION, SOLUTION INTRAVENOUS; SUBCUTANEOUS at 04:06

## 2017-06-19 RX ADMIN — FAMOTIDINE 20 MG: 20 TABLET, FILM COATED ORAL at 05:06

## 2017-06-19 RX ADMIN — Medication 3 ML: at 02:06

## 2017-06-19 RX ADMIN — LABETALOL HCL 500 MG: 200 TABLET, FILM COATED ORAL at 05:06

## 2017-06-19 RX ADMIN — INSULIN ASPART 2 UNITS: 100 INJECTION, SOLUTION INTRAVENOUS; SUBCUTANEOUS at 04:06

## 2017-06-19 NOTE — PLAN OF CARE
Problem: Fall Risk (Adult)  Goal: Absence of Falls  Patient will demonstrate the desired outcomes by discharge/transition of care.   Outcome: Ongoing (interventions implemented as appropriate)  Absence of falls noted at present. Fall risk protocol remains IP. SR up x's 3. Family member at bedside. Call bell within reach. Will cont to monitor.

## 2017-06-19 NOTE — PLAN OF CARE
Problem: Diabetes, Type 2 (Adult)  Intervention: Optimize Glycemic Control   06/18/17 1915   Nutrition Interventions   Glycemic Management blood glucose monitoring   Accu checks done before meals and covered per sliding scale. Pt ate breakfast and lunch--assisted by spouse--ayala well until 6 pm when Pt c/o nausea. MD informed and Zofran administered. Safety and seizure precautions maintained.

## 2017-06-19 NOTE — PT/OT/SLP PROGRESS
Speech Language Pathology  Treatment    Lucy Perez   MRN: [id number]   Admitting Diagnosis: Partial symptomatic epilepsy with complex partial seizures, not intractable, without status epilepticus    Diet recommendations: Solid Diet Level: Dental Soft  Liquid Diet Level: Nectar Thick   To achieve nectar thick liquid: 6 oz of any liquid to 1 pack of thickener  No ice cream, jello, or ice.  Avoid mixed consistencies (soup, cereal with milk, juicy fruits).  Choose puree/very soft items off tray.  Feed only when awake/alert*, No straws, HOB to 90 degrees, Small bites/sips and Assistance with thickening liquids    Consider PO as for pleasure only at this time as lethargy may prevent pt from getting adequate PO nutrition. Consider use of PEG to supplement.      SLP Treatment Date: 06/19/17  Speech Start Time: 0957     Speech Stop Time: 1016     Speech Total (8): 19 min       TREATMENT BILLABLE MINUTES:  Speech Therapy Individual 9 and Treatment Swallowing Dysfunction 10    Has the patient been evaluated by SLP for swallowing? : Yes  Keep patient NPO?: No   General Precautions: Standard, fall, aspiration          Subjective:  Pt awake    Pain/Comfort  Pain Rating 1: 0/10  Pain Rating Post-Intervention 1: 0/10    Objective:     Daughter expressed pt with oral stasis of food in mouth post meal yesterday; requesting how to successfully remove as pt has difficulty swishing liquid in oral cavity. Skilled education provided on recs for puree diet to assist with oral phase of swallow and elimination of oral stasis. Spouse denied desire for pt to be downgraded despite education on aspiration risk. Education provided on the use of oral swabs to remove excess food in oral cavity and to avoid thin liquid as pt on nectar thick liquids; all demonstrated understanding. Pt's breakfast tray held 2/2 low 02 per RN. 02 99% with face mask in place. Pt tolerated 4 spoonfuls nectar thick liquid and 2 bites pancake (dental soft)  without s/s of airway compromise. Trials administered with mask removed, administration of bolus, and immediate reapplication of mask. Oral phase cb decreased oral acceptance with slow bolus manipulation; good oral clearance. 02 94 at end of PO session; RN notified. Skilled education provided on aspiration precautions and diet recommendations; daughter indep taughback thickener/liquid ratio and demonstrated excellent understanding. Measuring cup provided to BS. Whiteboard updated with diet recommendations/aspiration precautions.     Pt immediately recalled 3 related or unrelated words with 33% acc indep, improving to 66% acc provided repetition. Dysarthria persists with fair intelligibility. No further questions.                                   Assessment:  Lucy Perez is a 56 y.o. female with a medical diagnosis of Partial symptomatic epilepsy with complex partial seizures, not intractable, without status epilepticus and presents with oropharygeal dysphagia, cognitive-linguistic impairment. continued progress towards goals.      Discharge recommendations: Discharge Facility/Level Of Care Needs: home health speech therapy (pending PT OT recs)     Goals:    SLP Goals        Problem: SLP Goal    Goal Priority Disciplines Outcome   SLP Goal     SLP    Description:  Speech Language Pathology Goals  Goals expected to be met by 6/20    1. Pt will tolerate dental soft diet/nectar thick liquids without overt s/s of aspiration  2. Pt will tolerate thin liquid trials x10 without overt s/s of aspiration  3. Pt will complete OME x10 reps with min cues  4. Pt will complete recall tasks with 70% accuracy and min cues  5. Pt will follow 3 step commands with 75% accuracy and min cues  6. Pt will complete problem solving tasks with 80% accuracy and min cues                         Plan:   Patient to be seen Therapy Frequency: 5 x/week   Plan of Care expires: 07/12/17  Plan of Care reviewed with: patient, spouse,  daughter  SLP Follow-up?: Yes             Keyana Cao M.A. CCC-SLP  Speech Language Pathologist  [phone]  6/19/2017

## 2017-06-19 NOTE — PLAN OF CARE
Problem: SLP Goal  Goal: SLP Goal  Speech Language Pathology Goals  Goals expected to be met by 6/20    1. Pt will tolerate dental soft diet/nectar thick liquids without overt s/s of aspiration  2. Pt will tolerate thin liquid trials x10 without overt s/s of aspiration  3. Pt will complete OME x10 reps with min cues  4. Pt will complete recall tasks with 70% accuracy and min cues  5. Pt will follow 3 step commands with 75% accuracy and min cues  6. Pt will complete problem solving tasks with 80% accuracy and min cues         Pt with continued progress towards goals.    Keyana Cao M.A. CCC-SLP  Speech Language Pathologist  [phone]  6/19/2017

## 2017-06-19 NOTE — PLAN OF CARE
Met with family at .  (daughter, male member)  ST in room trying different textures po     06/19/17 1108   [facility] Assessment   Can the patient answer the patient profile reliably? No, cognitively impaired   How often would a person be available to care for the patient? Whenever needed   Describe the patient's ability to walk at the present time. Does not walk or unable to take any steps at all   How does the patient rate their overall health at the present time? Fair   Number of comorbid conditions (as recorded on the chart) Five or more   During the past month, has the patient often been bothered by feeling down, depressed or hopeless? No   During the past month, has the patient often been bothered by little interest or pleasure in doing things? No

## 2017-06-20 PROBLEM — G40.909 SEIZURE DISORDER: Status: ACTIVE | Noted: 2017-06-20

## 2017-06-20 PROBLEM — N30.00 ACUTE CYSTITIS: Status: ACTIVE | Noted: 2017-06-20

## 2017-06-20 PROBLEM — R40.2420 GLASGOW COMA SCALE TOTAL SCORE 9-12: Status: ACTIVE | Noted: 2017-06-20

## 2017-06-20 PROBLEM — D63.8 ANEMIA OF CHRONIC DISEASE: Status: ACTIVE | Noted: 2017-06-20

## 2017-06-20 LAB
ALBUMIN SERPL BCP-MCNC: 2.6 G/DL
ALP SERPL-CCNC: 94 U/L
ALT SERPL W/O P-5'-P-CCNC: 13 U/L
ANION GAP SERPL CALC-SCNC: 7 MMOL/L
AST SERPL-CCNC: 25 U/L
BASOPHILS # BLD AUTO: 0.02 K/UL
BASOPHILS NFR BLD: 0.4 %
BILIRUB SERPL-MCNC: 0.3 MG/DL
BUN SERPL-MCNC: 22 MG/DL
CALCIUM SERPL-MCNC: 8.3 MG/DL
CHLORIDE SERPL-SCNC: 112 MMOL/L
CO2 SERPL-SCNC: 21 MMOL/L
CREAT SERPL-MCNC: 1.3 MG/DL
DIFFERENTIAL METHOD: ABNORMAL
EOSINOPHIL # BLD AUTO: 0.3 K/UL
EOSINOPHIL NFR BLD: 5.1 %
ERYTHROCYTE [DISTWIDTH] IN BLOOD BY AUTOMATED COUNT: 15.5 %
EST. GFR  (AFRICAN AMERICAN): 52.3 ML/MIN/1.73 M^2
EST. GFR  (NON AFRICAN AMERICAN): 45.3 ML/MIN/1.73 M^2
GLUCOSE SERPL-MCNC: 97 MG/DL
HCT VFR BLD AUTO: 28.6 %
HGB BLD-MCNC: 9 G/DL
LYMPHOCYTES # BLD AUTO: 1.1 K/UL
LYMPHOCYTES NFR BLD: 22.1 %
MAGNESIUM SERPL-MCNC: 1.7 MG/DL
MCH RBC QN AUTO: 29.8 PG
MCHC RBC AUTO-ENTMCNC: 31.5 %
MCV RBC AUTO: 95 FL
MONOCYTES # BLD AUTO: 0.7 K/UL
MONOCYTES NFR BLD: 13.5 %
NEUTROPHILS # BLD AUTO: 3 K/UL
NEUTROPHILS NFR BLD: 58.7 %
PHOSPHATE SERPL-MCNC: 3.2 MG/DL
PLATELET # BLD AUTO: 198 K/UL
PMV BLD AUTO: 9.9 FL
POCT GLUCOSE: 129 MG/DL (ref 70–110)
POCT GLUCOSE: 133 MG/DL (ref 70–110)
POCT GLUCOSE: 135 MG/DL (ref 70–110)
POCT GLUCOSE: 146 MG/DL (ref 70–110)
POCT GLUCOSE: 149 MG/DL (ref 70–110)
POTASSIUM SERPL-SCNC: 4.9 MMOL/L
PROCALCITONIN SERPL IA-MCNC: 0.15 NG/ML
PROT SERPL-MCNC: 5.9 G/DL
RBC # BLD AUTO: 3.02 M/UL
SODIUM SERPL-SCNC: 140 MMOL/L
WBC # BLD AUTO: 5.11 K/UL

## 2017-06-20 PROCEDURE — 25000003 PHARM REV CODE 250: Performed by: PSYCHIATRY & NEUROLOGY

## 2017-06-20 PROCEDURE — 27000221 HC OXYGEN, UP TO 24 HOURS

## 2017-06-20 PROCEDURE — 25000003 PHARM REV CODE 250: Performed by: STUDENT IN AN ORGANIZED HEALTH CARE EDUCATION/TRAINING PROGRAM

## 2017-06-20 PROCEDURE — 94640 AIRWAY INHALATION TREATMENT: CPT

## 2017-06-20 PROCEDURE — 11000001 HC ACUTE MED/SURG PRIVATE ROOM

## 2017-06-20 PROCEDURE — 25000242 PHARM REV CODE 250 ALT 637 W/ HCPCS: Performed by: PHYSICIAN ASSISTANT

## 2017-06-20 PROCEDURE — 92526 ORAL FUNCTION THERAPY: CPT

## 2017-06-20 PROCEDURE — 25000003 PHARM REV CODE 250: Performed by: HOSPITALIST

## 2017-06-20 PROCEDURE — 25000003 PHARM REV CODE 250: Performed by: NURSE PRACTITIONER

## 2017-06-20 PROCEDURE — 25000003 PHARM REV CODE 250: Performed by: PHYSICIAN ASSISTANT

## 2017-06-20 PROCEDURE — 99900026 HC AIRWAY MAINTENANCE (STAT)

## 2017-06-20 PROCEDURE — 63600175 PHARM REV CODE 636 W HCPCS: Performed by: HOSPITALIST

## 2017-06-20 PROCEDURE — 84145 PROCALCITONIN (PCT): CPT

## 2017-06-20 PROCEDURE — 92507 TX SP LANG VOICE COMM INDIV: CPT

## 2017-06-20 PROCEDURE — 84100 ASSAY OF PHOSPHORUS: CPT

## 2017-06-20 PROCEDURE — 85025 COMPLETE CBC W/AUTO DIFF WBC: CPT

## 2017-06-20 PROCEDURE — 83735 ASSAY OF MAGNESIUM: CPT

## 2017-06-20 PROCEDURE — 99233 SBSQ HOSP IP/OBS HIGH 50: CPT | Mod: ,,, | Performed by: HOSPITALIST

## 2017-06-20 PROCEDURE — 94761 N-INVAS EAR/PLS OXIMETRY MLT: CPT

## 2017-06-20 PROCEDURE — 36415 COLL VENOUS BLD VENIPUNCTURE: CPT

## 2017-06-20 PROCEDURE — 80053 COMPREHEN METABOLIC PANEL: CPT

## 2017-06-20 PROCEDURE — 94664 DEMO&/EVAL PT USE INHALER: CPT

## 2017-06-20 RX ADMIN — HYDRALAZINE HYDROCHLORIDE 75 MG: 50 TABLET ORAL at 02:06

## 2017-06-20 RX ADMIN — ALBUTEROL SULFATE 2.5 MG: 2.5 SOLUTION RESPIRATORY (INHALATION) at 01:06

## 2017-06-20 RX ADMIN — POLYETHYLENE GLYCOL 3350 17 G: 17 POWDER, FOR SOLUTION ORAL at 09:06

## 2017-06-20 RX ADMIN — Medication 3 ML: at 10:06

## 2017-06-20 RX ADMIN — LABETALOL HCL 500 MG: 200 TABLET, FILM COATED ORAL at 02:06

## 2017-06-20 RX ADMIN — Medication 3 ML: at 02:06

## 2017-06-20 RX ADMIN — STANDARDIZED SENNA CONCENTRATE AND DOCUSATE SODIUM 1 TABLET: 8.6; 5 TABLET, FILM COATED ORAL at 09:06

## 2017-06-20 RX ADMIN — ALBUTEROL SULFATE 2.5 MG: 2.5 SOLUTION RESPIRATORY (INHALATION) at 03:06

## 2017-06-20 RX ADMIN — HEPARIN SODIUM 5000 UNITS: 5000 INJECTION, SOLUTION INTRAVENOUS; SUBCUTANEOUS at 02:06

## 2017-06-20 RX ADMIN — SODIUM CHLORIDE: 0.9 INJECTION, SOLUTION INTRAVENOUS at 02:06

## 2017-06-20 RX ADMIN — LEVETIRACETAM 2000 MG: 100 SOLUTION ORAL at 08:06

## 2017-06-20 RX ADMIN — HEPARIN SODIUM 5000 UNITS: 5000 INJECTION, SOLUTION INTRAVENOUS; SUBCUTANEOUS at 06:06

## 2017-06-20 RX ADMIN — ALBUTEROL SULFATE 2.5 MG: 2.5 SOLUTION RESPIRATORY (INHALATION) at 04:06

## 2017-06-20 RX ADMIN — FAMOTIDINE 20 MG: 20 TABLET, FILM COATED ORAL at 06:06

## 2017-06-20 RX ADMIN — LISINOPRIL 10 MG: 10 TABLET ORAL at 09:06

## 2017-06-20 RX ADMIN — ASPIRIN 81 MG CHEWABLE TABLET 81 MG: 81 TABLET CHEWABLE at 09:06

## 2017-06-20 RX ADMIN — HYDRALAZINE HYDROCHLORIDE 75 MG: 50 TABLET ORAL at 06:06

## 2017-06-20 RX ADMIN — MICONAZOLE NITRATE: 20 OINTMENT TOPICAL at 09:06

## 2017-06-20 RX ADMIN — STANDARDIZED SENNA CONCENTRATE AND DOCUSATE SODIUM 1 TABLET: 8.6; 5 TABLET, FILM COATED ORAL at 08:06

## 2017-06-20 RX ADMIN — HEPARIN SODIUM 5000 UNITS: 5000 INJECTION, SOLUTION INTRAVENOUS; SUBCUTANEOUS at 10:06

## 2017-06-20 RX ADMIN — ALBUTEROL SULFATE 2.5 MG: 2.5 SOLUTION RESPIRATORY (INHALATION) at 09:06

## 2017-06-20 RX ADMIN — LABETALOL HCL 500 MG: 200 TABLET, FILM COATED ORAL at 06:06

## 2017-06-20 RX ADMIN — HYDRALAZINE HYDROCHLORIDE 75 MG: 50 TABLET ORAL at 08:06

## 2017-06-20 RX ADMIN — LEVETIRACETAM 2000 MG: 100 SOLUTION ORAL at 09:06

## 2017-06-20 RX ADMIN — ALBUTEROL SULFATE 2.5 MG: 2.5 SOLUTION RESPIRATORY (INHALATION) at 07:06

## 2017-06-20 RX ADMIN — ALBUTEROL SULFATE 2.5 MG: 2.5 SOLUTION RESPIRATORY (INHALATION) at 12:06

## 2017-06-20 RX ADMIN — GABAPENTIN 300 MG: 300 CAPSULE ORAL at 08:06

## 2017-06-20 RX ADMIN — MICONAZOLE NITRATE: 20 OINTMENT TOPICAL at 08:06

## 2017-06-20 RX ADMIN — AMLODIPINE BESYLATE 10 MG: 10 TABLET ORAL at 09:06

## 2017-06-20 RX ADMIN — FUROSEMIDE 40 MG: 10 INJECTION, SOLUTION INTRAVENOUS at 09:06

## 2017-06-20 RX ADMIN — LABETALOL HCL 500 MG: 200 TABLET, FILM COATED ORAL at 10:06

## 2017-06-20 NOTE — SUBJECTIVE & OBJECTIVE
Interval History: No acute events overnight. Called to patients bedside this AM for  SOB with hypoxia with desaturations the mid 80's and lethargy. Pt's respiratory status and mental status improved with IV lasix. Pt returned to baseline by afternoon.     Review of Systems   Unable to perform ROS: Mental status change     Objective:     Vital Signs (Most Recent):  Temp: 99.4 °F (37.4 °C) (06/19/17 2045)  Pulse: 73 (06/19/17 2152)  Resp: 19 (06/19/17 2152)  BP: (!) 145/67 (06/19/17 2045)  SpO2: 99 % (06/19/17 2152) Vital Signs (24h Range):  Temp:  [98.6 °F (37 °C)-100.3 °F (37.9 °C)] 99.4 °F (37.4 °C)  Pulse:  [73-83] 73  Resp:  [14-24] 19  SpO2:  [85 %-99 %] 99 %  BP: ()/(52-97) 145/67     Weight: 102.1 kg (225 lb 1.4 oz)  Body mass index is 38.64 kg/m².    Intake/Output Summary (Last 24 hours) at 06/19/17 2243  Last data filed at 06/19/17 1800   Gross per 24 hour   Intake             5500 ml   Output              650 ml   Net             4850 ml      Physical Exam   Constitutional: She appears well-developed and well-nourished.   Cardiovascular: Normal rate, regular rhythm and normal heart sounds.  Exam reveals no gallop and no friction rub.    No murmur heard.  Pulmonary/Chest: Effort normal and breath sounds normal. No respiratory distress. She has no wheezes. She has no rales.   Abdominal: Soft. Bowel sounds are normal. She exhibits no distension. There is no tenderness.   Musculoskeletal: Normal range of motion.   Neurological: She is alert.   Follows commands. AO x 2 - person and place    Skin: Skin is warm and dry.       Significant Labs:   CBC:     Recent Labs  Lab 06/18/17 0402 06/19/17  0728   WBC 5.23 8.41   HGB 9.4* 9.4*   HCT 29.5* 29.1*    194     CMP:     Recent Labs  Lab 06/18/17  0402 06/19/17  0728    138   K 4.8 4.9   * 111*   CO2 20* 21*   GLU 89 132*   BUN 24* 21*   CREATININE 1.5* 1.4   CALCIUM 8.3* 8.4*   PROT 5.8* 5.8*   ALBUMIN 2.6* 2.5*   BILITOT 0.3 0.2   ALKPHOS  88 101   AST 21 21   ALT 10 13   ANIONGAP 8 6*   EGFRNONAA 38.1* 41.4*       Significant Imaging: I have reviewed and interpreted all pertinent imaging results/findings within the past 24 hours.

## 2017-06-20 NOTE — ASSESSMENT & PLAN NOTE
Episode of hypoxia today; improved with lasix   -Will give additional 40 IV lasix in the AM. Will continue PO lasix thereafter

## 2017-06-20 NOTE — PLAN OF CARE
Problem: SLP Goal  Goal: SLP Goal  Speech Language Pathology Goals  Goals expected to be met by 6/20    1. Pt will tolerate dental soft diet/nectar thick liquids without overt s/s of aspiration  2. Pt will tolerate thin liquid trials x10 without overt s/s of aspiration  3. Pt will complete OME x10 reps with min cues  4. Pt will complete recall tasks with 70% accuracy and min cues  5. Pt will follow 3 step commands with 75% accuracy and min cues  6. Pt will complete problem solving tasks with 80% accuracy and min cues        Pt with improved alertness today.     MAURICE Rosario, CCC-SLP  6/20/2017

## 2017-06-20 NOTE — ASSESSMENT & PLAN NOTE
Step down from Neuro critical care. Pt still with episodes of staring and decreased responsiveness 6/16. Continues to improve. Per neurology - not concerning for continuous  clinical seizures. Possible related to tegretol.   - Neurology consulted and signed off. Appreciate recs   - EEG abnormal but negative for seizure   - continue keppra;  D/c Carbamezapine and dc tegratol per recs   - Pts mental status close to baseline per family

## 2017-06-20 NOTE — PROGRESS NOTES
[facility]  Adult Nutrition  Progress Note    SUMMARY     Recommendations    Recommendation/Intervention: Continue with current diet Adv texture per SLP  2. encourage HPV with each meal. 3.Encourage po intake >/= 75% 4. RD will continue to follow  Goals: Meet >75% EEN  Nutrition Goal Status: goal met  Communication of RD Recs: reviewed with RN     Reason for Assessment    Reason for Assessment: RD follow-up  Diagnosis: other (see comments) (Seizure disorder)  Relevent Medical History: DM, HTN, HLD, PEG placed   Interdisciplinary Rounds: did not attend     General Information Comments: Appetite is good 100% lunch, speech is improving, able to answer RD questions. PEG in place,but not inuse    Nutrition Discharge Planning: Adequate nutritional po intake    Nutrition Prescription Ordered    Current Diet Order: Diabetic 1800  Nutrition Order Comments: soft w nectar thick liquids  Current Nutrition Support Formula Ordered: Diabetisource  Current Nutrition Support Rate Ordered: 50 (ml)  Current Nutrition Support Frequency Ordered: ml/hr        Evaluation of Received Nutrients/Fluid Intake    Intake/Output Summary (Last 24 hours) at 06/20/17 1530  Last data filed at 06/20/17 0945   Gross per 24 hour   Intake             6500 ml   Output                0 ml   Net             6500 ml           Nutrition Risk Screen     Nutrition Risk Screen: no indicators    Nutrition/Diet History    Patient Reported Diet/Restrictions/Preferences: general, carbohydrate counting  Typical Food/Fluid Intake: adequate pta  Food Preferences: No cultural or [employer]ian preferences        Factors Affecting Nutritional Intake: NPO, nausea/vomiting, difficulty/impaired swallowing                Labs/Tests/Procedures/Meds       Pertinent Labs Reviewed: reviewed  Pertinent Labs Comments: POCT Glu   Pertinent Medications Reviewed: reviewed  Pertinent Medications Comments: heparin, asa, IVF    Physical Findings    Overall Physical  "Appearance: overweight  Tubes: gastrostomy tube     Skin: intact    Anthropometrics    Temp: 97.8 °F (36.6 °C)     Height: 5' 4" (162.6 cm)  Weight Method: Stated  Weight: 102.1 kg (225 lb 1.4 oz)     Ideal Body Weight (IBW), Female: 120 lb     % Ideal Body Weight, Female (lb): 187.58 lb  BMI (Calculated): 38.7  BMI Grade: 35 - 39.9 - obesity - grade II      Estimated/Assessed Needs    Weight Used For Calorie Calculations: 102.1 kg (225 lb 1.4 oz)         Energy Need Method: [place]-St Jeor, other (see comments) (6280-5269 kcal/d)      RMR ([place]-St. Jeor Equation): 1586        Weight Used For Protein Calculations: 102.1 kg (225 lb 1.4 oz)  Protein Requirements:  g/d  Fluid Need Method: RDA Method, other (see comments) (Per MD or 1 mL/kcal)          CHO Requirement: 50% total kcals     Assessment and Plan    Type 2 diabetes mellitus with diabetic chronic kidney disease    Nutrition Problem:   Inconsistent carbohydrate intake    Etiology/Related to:   NPO, decreased appetite,     As Evident By:  Glu-    Treatment Strategy:   Encourage carb counting  consistence in diet    Nutrition Diagnosis Status:   New              Monitor and Evaluation    Food and Nutrient Intake: energy intake, food and beverage intake, enteral nutrition intake  Food and Nutrient Adminstration: diet order, enteral and parenteral nutrition administration     Physical Activity and Function: nutrition-related ADLs and IADLs  Anthropometric Measurements: weight, weight change, body mass index  Biochemical Data, Medical Tests and Procedures: electrolyte and renal panel, gastrointestinal profile, glucose/endocrine profile, inflammatory profile, lipid profile  Nutrition-Focused Physical Findings: overall appearance  % Intake of Estimated Energy Needs: 75 - 100 %  % Meal Intake: 75%    Nutrition Risk    Level of Risk: other (see comments) (1x/week)    Nutrition Follow-Up    RD Follow-up?: Yes  "

## 2017-06-20 NOTE — PROGRESS NOTES
"[facility] Hospital Medicine  Progress Note    Patient Name: Lucy Perez  MRN: [id number]  Patient Class: IP- Inpatient   Admission Date: 6/6/2017  Length of Stay: 13 days  Attending Physician: Anel De La Rosa MD  Primary Care Provider: Beverly Muniz MD    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 The patient's family reports that the patient has had an elevated BP to the 180s for the past week. They report that at times the patient "will be sleepy" and poorly responsive and usually improves within a few hours. However,  At about 2pm she was found to be staring up the ceiling and remained unresponsive.  The family attempted to feed the patient at lunch through her PEG tube but the patient vomited shortly thereafter.   Her baseline is described as dependent for activities of daily living, verbal and interactive with limited mobility due to left hemiparesis.   She had hemicranectomy for stroke treatment in 2016. She is currently on keppra and tegretol and per family was started post stroke for seizure prophylaxis. No history of seizure disorder per patient's daughter.        Hospital Course:  6/7 admission to [facility]  6/8 R PLED-> increased carbamazepine, levetiracetam  6/9 waxing/waning R PLED but no seizure  6/12: Patient came because of SZ. R PLEDS and slowing and no ictal activity.  6/13: MRI of the brain is stable. No acute strokes. Patient had severe tigh pain. Tegretol level 10.3. Serum Na OK. Enterbacter resistant to Ceftriaxine. Antibiotics were changed to Cipro. RLE tenderness. Dermatology " "consultation left recommendations.  6/14:No events. On Cipro for enterobacter UTI.  BP low during the night. No BM yet,    Pt stepped down from Neuro critical care on 6/16/2017. Neurology consulted at that time for continued episodes of decreased responsiveness and leftward gaze.  Neurology discussed case with  Daiana Gold and Roseanna (epilepsy).  Per note: "Patient awareness is variable with improved reactivity to environment after 30 min from previous examination yesterday evening.  Cannot rule out previous seizure but is not having continuous clinical seizures. Based on nystagmus and altered state, may actually be relatively toxic from the Tegretol.  Adequately covered for seizure with Keppra." Tegretol and Carbamezapine discontinued per recs. Pt improved to baseline mental status.     Interval History: No acute events overnight. Called to patients bedside this AM for  SOB with hypoxia with desaturations the mid 80's and lethargy. Pt was placed on ventimask with improvement in O2 sats. Pt's respiratory status and mental status improved with IV lasix. Pt returned to baseline by afternoon.     Review of Systems   Unable to perform ROS: Mental status change     Objective:     Vital Signs (Most Recent):  Temp: 99.4 °F (37.4 °C) (06/19/17 2045)  Pulse: 73 (06/19/17 2152)  Resp: 19 (06/19/17 2152)  BP: (!) 145/67 (06/19/17 2045)  SpO2: 99 % (06/19/17 2152) Vital Signs (24h Range):  Temp:  [98.6 °F (37 °C)-100.3 °F (37.9 °C)] 99.4 °F (37.4 °C)  Pulse:  [73-83] 73  Resp:  [14-24] 19  SpO2:  [85 %-99 %] 99 %  BP: ()/(52-97) 145/67     Weight: 102.1 kg (225 lb 1.4 oz)  Body mass index is 38.64 kg/m².    Intake/Output Summary (Last 24 hours) at 06/19/17 3208  Last data filed at 06/19/17 1800   Gross per 24 hour   Intake             5500 ml   Output              650 ml   Net             4850 ml      Physical Exam   Constitutional: She appears well-developed and well-nourished.   Cardiovascular: Normal rate, regular " rhythm and normal heart sounds.  Exam reveals no gallop and no friction rub.    No murmur heard.  Pulmonary/Chest: Effort normal and breath sounds normal. No respiratory distress. She has no wheezes. She has no rales.   Abdominal: Soft. Bowel sounds are normal. She exhibits no distension. There is no tenderness.   Musculoskeletal: Normal range of motion.   Neurological: She is alert.   Follows commands. AO x 2 - person and place    Skin: Skin is warm and dry.       Significant Labs:   CBC:     Recent Labs  Lab 06/18/17  0402 06/19/17  0728   WBC 5.23 8.41   HGB 9.4* 9.4*   HCT 29.5* 29.1*    194     CMP:     Recent Labs  Lab 06/18/17  0402 06/19/17  0728    138   K 4.8 4.9   * 111*   CO2 20* 21*   GLU 89 132*   BUN 24* 21*   CREATININE 1.5* 1.4   CALCIUM 8.3* 8.4*   PROT 5.8* 5.8*   ALBUMIN 2.6* 2.5*   BILITOT 0.3 0.2   ALKPHOS 88 101   AST 21 21   ALT 10 13   ANIONGAP 8 6*   EGFRNONAA 38.1* 41.4*       Significant Imaging: I have reviewed and interpreted all pertinent imaging results/findings within the past 24 hours.    Assessment/Plan:      * Partial symptomatic epilepsy with complex partial seizures, not intractable, without status epilepticus    Step down from Neuro critical care. Pt still with episodes of staring and decreased responsiveness 6/16. Continues to improve. Per neurology - not concerning for continuous  clinical seizures. Possible related to tegretol.   - Neurology consulted and signed off. Appreciate recs   - EEG abnormal but negative for seizure   - continue keppra;  D/c Carbamezapine and dc tegratol per recs   - Pts mental status close to baseline per family            Type 2 diabetes mellitus with diabetic chronic kidney disease    Stable. Cont detemir, ISS         Acute cystitis    Resolved. Completed 7/7 days for  enterobacter infection.        Essential hypertension    Still above goal at times  Cont labetalol 500 TID, cont hydralzine to 100 tid  Cont amlodipine 10 ,  lisinopril 10   Add lasix as noted below         Left ventricular diastolic dysfunction with preserved systolic function    Episode of hypoxia today; improved with lasix   -Will give additional 40 IV lasix in the AM. Will continue PO lasix thereafter  - will get pro calcitonin to r/o other causes of hypoxia given possible left sided opacification on cxr.              VTE Risk Mitigation         Ordered     heparin (porcine) injection 5,000 Units  Every 8 hours     Route:  Subcutaneous        06/06/17 2316     High Risk of VTE  Once      06/06/17 2316          Anel De La Rosa MD  Department of Hospital Medicine   [facility]

## 2017-06-20 NOTE — ASSESSMENT & PLAN NOTE
Still above goal at times  Cont labetalol 500 TID, cont hydralzine to 100 tid  Cont amlodipine 10 , lisinopril 10   Add lasix as noted below

## 2017-06-20 NOTE — ASSESSMENT & PLAN NOTE
Nutrition Problem:   Inconsistent carbohydrate intake    Etiology/Related to:   NPO, decreased appetite,     As Evident By:  Glu-    Treatment Strategy:   Encourage carb counting  consistence in diet    Nutrition Diagnosis Status:   New

## 2017-06-20 NOTE — PLAN OF CARE
Discharge plan is [facility] when medically ready. Initiated through [facility]. Awaiting  orders.    Cecy Romano LMSW  [id number]

## 2017-06-20 NOTE — PT/OT/SLP PROGRESS
"Speech Language Pathology  Treatment    Lucy Perez   MRN: [id number]   Admitting Diagnosis: Partial symptomatic epilepsy with complex partial seizures, not intractable, without status epilepticus    Diet recommendations: Solid Diet Level: Dental Soft  Liquid Diet Level: Nectar Thick Feed only when awake/alert, No straws, HOB to 90 degrees, Check for pocketing/oral residue and Assistance with thickening liquids    SLP Treatment Date: 06/20/17  Speech Start Time: 1006     Speech Stop Time: 1031     Speech Total (min): 25 min       TREATMENT BILLABLE MINUTES:  Speech Therapy Individual 15 and Treatment Swallowing Dysfunction 10    Has the patient been evaluated by SLP for swallowing? : Yes  Keep patient NPO?: No   General Precautions: Standard, aspiration, fall          Subjective:  " I need to scratch"    Pain/Comfort  Pain Rating 1: 0/10  Pain Rating Post-Intervention 1: 0/10    Objective:      Pt seen bedside with her  present. Pt was awake/alert but distracted at end of session with need to scratch her back. Pt was oriented to month and place. With binary choice, year as well. Improved vocal quality/intensity and speech noted. Pt still with congested breath sounds. Pt given teaspoon of thin liquids x1 and sips of thin via cup x3. Pt with a throat clear and change in vocal quality noted with 2/3 cup sips of thin liquids. Pt tolerated nectar thick liquids without difficulty and good mastication of cracker with no significant oral stasis.  said pt did well with breakfast this am. REviewed need to still use thickner a this time. Good recall from  re: ratio of thickner to use. Pt named wrong member in a category in a f=4 with 25%acc. Pt very distracted by wanted to scratch and unable to focus. Pt needed frequent repetition and redirection. White board updated.                                    Assessment:  Lucy Perez is a 56 y.o. female with a medical diagnosis of Partial " symptomatic epilepsy with complex partial seizures, not intractable, without status epilepticus and presents with cog linguistic deficits and dysphagia. Pt progressing towards goals    Discharge recommendations: Discharge Facility/Level Of Care Needs: home health speech therapy     Goals:    SLP Goals        Problem: SLP Goal    Goal Priority Disciplines Outcome   SLP Goal     SLP    Description:  Speech Language Pathology Goals  Goals expected to be met by 6/20    1. Pt will tolerate dental soft diet/nectar thick liquids without overt s/s of aspiration  2. Pt will tolerate thin liquid trials x10 without overt s/s of aspiration  3. Pt will complete OME x10 reps with min cues  4. Pt will complete recall tasks with 70% accuracy and min cues  5. Pt will follow 3 step commands with 75% accuracy and min cues  6. Pt will complete problem solving tasks with 80% accuracy and min cues                         Plan:   Patient to be seen Therapy Frequency: 5 x/week   Plan of Care expires: 07/12/17  Plan of Care reviewed with: patient, spouse  SLP Follow-up?: Yes              MAURICE Rosario, CCC-SLP  06/20/2017

## 2017-06-20 NOTE — PLAN OF CARE
Pt discussed in IDTR, per Dr. Pacheco pt is a step down from [facility], if pt's AMS has improved and she has returned to her baseline she will be able to d/c this afternoon. Advised pt's DCP is to go home with [facility], requested orders in prep for d/c this afternoon.

## 2017-06-21 LAB
ALBUMIN SERPL BCP-MCNC: 2.5 G/DL
ALP SERPL-CCNC: 87 U/L
ALT SERPL W/O P-5'-P-CCNC: 12 U/L
ANION GAP SERPL CALC-SCNC: 7 MMOL/L
AST SERPL-CCNC: 17 U/L
BASOPHILS # BLD AUTO: 0.02 K/UL
BASOPHILS NFR BLD: 0.4 %
BILIRUB SERPL-MCNC: 0.2 MG/DL
BUN SERPL-MCNC: 22 MG/DL
CALCIUM SERPL-MCNC: 8.2 MG/DL
CHLORIDE SERPL-SCNC: 112 MMOL/L
CO2 SERPL-SCNC: 22 MMOL/L
CREAT SERPL-MCNC: 1.6 MG/DL
DIFFERENTIAL METHOD: ABNORMAL
EOSINOPHIL # BLD AUTO: 0.3 K/UL
EOSINOPHIL NFR BLD: 6.8 %
ERYTHROCYTE [DISTWIDTH] IN BLOOD BY AUTOMATED COUNT: 15.3 %
EST. GFR  (AFRICAN AMERICAN): 40.7 ML/MIN/1.73 M^2
EST. GFR  (NON AFRICAN AMERICAN): 35.3 ML/MIN/1.73 M^2
GLUCOSE SERPL-MCNC: 102 MG/DL
HCT VFR BLD AUTO: 26.2 %
HGB BLD-MCNC: 8.2 G/DL
LYMPHOCYTES # BLD AUTO: 1 K/UL
LYMPHOCYTES NFR BLD: 22.6 %
MAGNESIUM SERPL-MCNC: 1.6 MG/DL
MCH RBC QN AUTO: 29.6 PG
MCHC RBC AUTO-ENTMCNC: 31.3 %
MCV RBC AUTO: 95 FL
MONOCYTES # BLD AUTO: 0.5 K/UL
MONOCYTES NFR BLD: 11.8 %
NEUTROPHILS # BLD AUTO: 2.7 K/UL
NEUTROPHILS NFR BLD: 58.4 %
PHOSPHATE SERPL-MCNC: 3.7 MG/DL
PLATELET # BLD AUTO: 213 K/UL
PMV BLD AUTO: 9.9 FL
POCT GLUCOSE: 128 MG/DL (ref 70–110)
POCT GLUCOSE: 140 MG/DL (ref 70–110)
POCT GLUCOSE: 147 MG/DL (ref 70–110)
POCT GLUCOSE: 148 MG/DL (ref 70–110)
POTASSIUM SERPL-SCNC: 4.8 MMOL/L
PROT SERPL-MCNC: 5.6 G/DL
RBC # BLD AUTO: 2.77 M/UL
SODIUM SERPL-SCNC: 141 MMOL/L
WBC # BLD AUTO: 4.56 K/UL

## 2017-06-21 PROCEDURE — 25000003 PHARM REV CODE 250: Performed by: NURSE PRACTITIONER

## 2017-06-21 PROCEDURE — 83735 ASSAY OF MAGNESIUM: CPT

## 2017-06-21 PROCEDURE — 11000001 HC ACUTE MED/SURG PRIVATE ROOM

## 2017-06-21 PROCEDURE — 25000003 PHARM REV CODE 250: Performed by: PHYSICIAN ASSISTANT

## 2017-06-21 PROCEDURE — 92526 ORAL FUNCTION THERAPY: CPT

## 2017-06-21 PROCEDURE — 25000003 PHARM REV CODE 250: Performed by: STUDENT IN AN ORGANIZED HEALTH CARE EDUCATION/TRAINING PROGRAM

## 2017-06-21 PROCEDURE — 94761 N-INVAS EAR/PLS OXIMETRY MLT: CPT

## 2017-06-21 PROCEDURE — 25000242 PHARM REV CODE 250 ALT 637 W/ HCPCS: Performed by: PHYSICIAN ASSISTANT

## 2017-06-21 PROCEDURE — 27000221 HC OXYGEN, UP TO 24 HOURS

## 2017-06-21 PROCEDURE — 84100 ASSAY OF PHOSPHORUS: CPT

## 2017-06-21 PROCEDURE — 94640 AIRWAY INHALATION TREATMENT: CPT

## 2017-06-21 PROCEDURE — 25000003 PHARM REV CODE 250: Performed by: HOSPITALIST

## 2017-06-21 PROCEDURE — 36415 COLL VENOUS BLD VENIPUNCTURE: CPT

## 2017-06-21 PROCEDURE — 80053 COMPREHEN METABOLIC PANEL: CPT

## 2017-06-21 PROCEDURE — 63600175 PHARM REV CODE 636 W HCPCS: Performed by: HOSPITALIST

## 2017-06-21 PROCEDURE — 94760 N-INVAS EAR/PLS OXIMETRY 1: CPT

## 2017-06-21 PROCEDURE — 25000003 PHARM REV CODE 250: Performed by: PSYCHIATRY & NEUROLOGY

## 2017-06-21 PROCEDURE — 99233 SBSQ HOSP IP/OBS HIGH 50: CPT | Mod: ,,, | Performed by: HOSPITALIST

## 2017-06-21 PROCEDURE — 27000173 HC ACAPELLA DEVICE DH OR DM

## 2017-06-21 PROCEDURE — 92507 TX SP LANG VOICE COMM INDIV: CPT

## 2017-06-21 PROCEDURE — 85025 COMPLETE CBC W/AUTO DIFF WBC: CPT

## 2017-06-21 PROCEDURE — 94664 DEMO&/EVAL PT USE INHALER: CPT

## 2017-06-21 RX ORDER — CLONIDINE HYDROCHLORIDE 0.1 MG/1
0.1 TABLET ORAL ONCE
Status: COMPLETED | OUTPATIENT
Start: 2017-06-21 | End: 2017-06-21

## 2017-06-21 RX ORDER — FUROSEMIDE 10 MG/ML
40 INJECTION INTRAMUSCULAR; INTRAVENOUS ONCE
Status: COMPLETED | OUTPATIENT
Start: 2017-06-21 | End: 2017-06-21

## 2017-06-21 RX ADMIN — MICONAZOLE NITRATE: 20 OINTMENT TOPICAL at 08:06

## 2017-06-21 RX ADMIN — Medication 3 ML: at 09:06

## 2017-06-21 RX ADMIN — ALBUTEROL SULFATE 2.5 MG: 2.5 SOLUTION RESPIRATORY (INHALATION) at 12:06

## 2017-06-21 RX ADMIN — LEVETIRACETAM 2000 MG: 100 SOLUTION ORAL at 08:06

## 2017-06-21 RX ADMIN — SODIUM CHLORIDE: 0.9 INJECTION, SOLUTION INTRAVENOUS at 11:06

## 2017-06-21 RX ADMIN — ASPIRIN 81 MG CHEWABLE TABLET 81 MG: 81 TABLET CHEWABLE at 09:06

## 2017-06-21 RX ADMIN — FUROSEMIDE 40 MG: 40 TABLET ORAL at 09:06

## 2017-06-21 RX ADMIN — AMLODIPINE BESYLATE 10 MG: 10 TABLET ORAL at 09:06

## 2017-06-21 RX ADMIN — HYDRALAZINE HYDROCHLORIDE 75 MG: 50 TABLET ORAL at 05:06

## 2017-06-21 RX ADMIN — ALBUTEROL SULFATE 2.5 MG: 2.5 SOLUTION RESPIRATORY (INHALATION) at 09:06

## 2017-06-21 RX ADMIN — LABETALOL HCL 500 MG: 200 TABLET, FILM COATED ORAL at 12:06

## 2017-06-21 RX ADMIN — GABAPENTIN 300 MG: 300 CAPSULE ORAL at 08:06

## 2017-06-21 RX ADMIN — CLONIDINE HYDROCHLORIDE 0.1 MG: 0.1 TABLET ORAL at 07:06

## 2017-06-21 RX ADMIN — LABETALOL HCL 500 MG: 200 TABLET, FILM COATED ORAL at 05:06

## 2017-06-21 RX ADMIN — LABETALOL HCL 500 MG: 200 TABLET, FILM COATED ORAL at 09:06

## 2017-06-21 RX ADMIN — HYDRALAZINE HYDROCHLORIDE 75 MG: 50 TABLET ORAL at 12:06

## 2017-06-21 RX ADMIN — MICONAZOLE NITRATE: 20 OINTMENT TOPICAL at 09:06

## 2017-06-21 RX ADMIN — HEPARIN SODIUM 5000 UNITS: 5000 INJECTION, SOLUTION INTRAVENOUS; SUBCUTANEOUS at 12:06

## 2017-06-21 RX ADMIN — HEPARIN SODIUM 5000 UNITS: 5000 INJECTION, SOLUTION INTRAVENOUS; SUBCUTANEOUS at 09:06

## 2017-06-21 RX ADMIN — HYDRALAZINE HYDROCHLORIDE 75 MG: 50 TABLET ORAL at 09:06

## 2017-06-21 RX ADMIN — STANDARDIZED SENNA CONCENTRATE AND DOCUSATE SODIUM 1 TABLET: 8.6; 5 TABLET, FILM COATED ORAL at 08:06

## 2017-06-21 RX ADMIN — HEPARIN SODIUM 5000 UNITS: 5000 INJECTION, SOLUTION INTRAVENOUS; SUBCUTANEOUS at 05:06

## 2017-06-21 RX ADMIN — FAMOTIDINE 20 MG: 20 TABLET, FILM COATED ORAL at 05:06

## 2017-06-21 RX ADMIN — LEVETIRACETAM 2000 MG: 100 SOLUTION ORAL at 09:06

## 2017-06-21 RX ADMIN — Medication 3 ML: at 05:06

## 2017-06-21 RX ADMIN — STANDARDIZED SENNA CONCENTRATE AND DOCUSATE SODIUM 1 TABLET: 8.6; 5 TABLET, FILM COATED ORAL at 09:06

## 2017-06-21 RX ADMIN — ACETAMINOPHEN 650 MG: 325 TABLET ORAL at 12:06

## 2017-06-21 RX ADMIN — ALBUTEROL SULFATE 2.5 MG: 2.5 SOLUTION RESPIRATORY (INHALATION) at 08:06

## 2017-06-21 RX ADMIN — ACETAMINOPHEN 650 MG: 325 TABLET ORAL at 06:06

## 2017-06-21 RX ADMIN — ALBUTEROL SULFATE 2.5 MG: 2.5 SOLUTION RESPIRATORY (INHALATION) at 04:06

## 2017-06-21 RX ADMIN — FUROSEMIDE 40 MG: 10 INJECTION, SOLUTION INTRAVENOUS at 07:06

## 2017-06-21 RX ADMIN — LISINOPRIL 10 MG: 10 TABLET ORAL at 09:06

## 2017-06-21 RX ADMIN — POLYETHYLENE GLYCOL 3350 17 G: 17 POWDER, FOR SOLUTION ORAL at 09:06

## 2017-06-21 RX ADMIN — ACETAMINOPHEN 650 MG: 325 TABLET ORAL at 09:06

## 2017-06-21 RX ADMIN — ALBUTEROL SULFATE 2.5 MG: 2.5 SOLUTION RESPIRATORY (INHALATION) at 11:06

## 2017-06-21 NOTE — PT/OT/SLP PROGRESS
"Speech Language Pathology  Treatment    Lucy Perez   MRN: [id number]   903/903 A    Admitting Diagnosis: Partial symptomatic epilepsy with complex partial seizures, not intractable, without status epilepticus    Diet recommendations: Solid Diet Level: Dental Soft  Liquid Diet Level: Nectar Thick   To achieve nectar thick liquid: 6 oz of any liquid to 1 pack of thickener  · No ice cream, jello, or ice.  · Avoid mixed consistencies (soup, cereal with milk, juicy fruits).  Choose puree/very soft items off tray  Feed only when awake/alert*, No straws, HOB to 90 degrees, Small bites/sips and Assistance with thickening liquids  Assistance with meals- per , pt independently takes very large bites    Consider PO as for pleasure only at this time as lethargy may prevent pt from getting adequate PO nutrition. Consider use of PEG to supplement.     SLP Treatment Date: 06/21/17   Speech Start Time: 1327     Speech Stop Time: 1345     Speech Total (min): 18 min       TREATMENT BILLABLE MINUTES:  Speech Therapy Individual 8 and Treatment Swallowing Dysfunction 10    Has the patient been evaluated by SLP for swallowing? : Yes  Keep patient NPO?: No   General Precautions: Standard, aspiration, fall          Subjective:  Pt seen with RN and  present in room with finshed lunch tray.      Objective:     Pt with noted unthickened liquids on tray and oral residue. Pt unable to clear oral residue with NECTAR thick liquid wash. No overt s/s of aspiration noted with nectar thick liquid trials. SLP removed lingual residue with oral swab.  When SLP questioned thickener use, pt's  stated, "Okay okay sometimes I cheat a little and don't thicken it up all the way." Pt's  independently noted correctly thickener use (1 packet for every 6 oz). When SLP questioned pt's swallowing history, her  reported pt had a "swallowing test" at [facility] a few months ago where a pureed diet and nectar thick liquids " "were recommended. Pt's  reported a dental soft diet at home s/p being d/nestor from home. SLP discussed recommendations, anatomy of swallowing, pt's weakness, and s/s and risks of aspiration at length. SLP provided education on previous recommendations of po diet being for pleasure and not primary means of nutrition 2/2 pt's lethargy and concern for remaining awake/alert for adequate intake. Pt's  verbalized understanding of all discussed and agreed to be more compliant with thickener and SLP recommendations. Per RN, pt to receive a repeat CXR 2/2 increased wet breathing sounds.   Discussed POC with RN and MD. ST will continue to follow.      Pt noted to imtermittantly close eyes. Pt stated name and inconsistently verbalized answers to SLP questions. When SLP told pt to stay awake, she stated, "I'm trying." Pt followed commands to complete 2 oral motor exercises. Pt did not follow further commands to complete any exercises. When SLP asked pt why she was not following commands, pt did not answer.       Assessment:  Lucy Perez is a 56 y.o. female with a medical diagnosis of Partial symptomatic epilepsy with complex partial seizures, not intractable, without status epilepticus and presents with Dysphagia and cognitive-linguistic deficits. ST will continue to follow.     Discharge recommendations: Discharge Facility/Level Of Care Needs: home health speech therapy     Goals:    SLP Goals        Problem: SLP Goal    Goal Priority Disciplines Outcome   SLP Goal     SLP Ongoing (interventions implemented as appropriate)   Description:  Speech Language Pathology Goals  Goals expected to be met by 6/20    1. Pt will tolerate dental soft diet/nectar thick liquids without overt s/s of aspiration  2. Pt will tolerate thin liquid trials x10 without overt s/s of aspiration  3. Pt will complete OME x10 reps with min cues  4. Pt will complete recall tasks with 70% accuracy and min cues  5. Pt will follow 3 " step commands with 75% accuracy and min cues  6. Pt will complete problem solving tasks with 80% accuracy and min cues                         Plan:   Patient to be seen Therapy Frequency: 5 x/week   Plan of Care expires: 07/12/17  Plan of Care reviewed with: patient, spouse  SLP Follow-up?: Yes              MAURICE Alicea, CCC-SLP  06/21/2017

## 2017-06-21 NOTE — PROGRESS NOTES
Spoke with MD Hernandez on call w/ IMS r/t noted increased efforts in breathing this shift, and pt reporting more difficulty with breathing. Previous episode of desats to 70% 2 days ago requiring IV lasix injection. As of today, Lasix route switch to tablet via PEG. Pt sats maintaining >95% on 1.5L n/c, but pt with abdominal use, coarse lung sounds with expiratory wheezing noted on auscultation. Found out as Speech Therapist and RN at bedside that during lunch feed, pt family member still not following correct technique of feeding per liquids not thickened appropriately. Pt may not be tolerating dental soft diet as well as expected. D/w family in detail correct way pt should be fed and for what reasons.  verbalized understanding. At this time, MD Pacheco to place orders for chest x-ray portable. Will follow up results after completion.

## 2017-06-21 NOTE — PLAN OF CARE
Pt discussed in IDTR, per Dr. Pacheco there is concern for the pt having aspirated on pleasure fed foods fed to her by her . (see nurses notes for specifics and education given to the pt's ). Pt to get a CXR today to r/o aspiration PNA. CM/SW will cont to follow.

## 2017-06-21 NOTE — SUBJECTIVE & OBJECTIVE
Interval History: No acute events overnight. Patient was supposed to be discharged yesterday however discharge Held due to hypoxemia which resolved today after lasix administration.     Review of Systems   Unable to perform ROS: Mental status change     Objective:     Vital Signs (Most Recent):  Temp: 98.3 °F (36.8 °C) (06/20/17 1920)  Pulse: (!) 52 (06/20/17 2300)  Resp: 18 (06/20/17 2117)  BP: (!) 168/74 (06/20/17 2213)  SpO2: 98 % (06/20/17 2117) Vital Signs (24h Range):  Temp:  [97.8 °F (36.6 °C)-98.7 °F (37.1 °C)] 98.3 °F (36.8 °C)  Pulse:  [52-80] 52  Resp:  [15-18] 18  SpO2:  [95 %-99 %] 98 %  BP: (143-183)/(64-92) 168/74     Weight: 102.1 kg (225 lb 1.4 oz)  Body mass index is 38.64 kg/m².    Intake/Output Summary (Last 24 hours) at 06/21/17 0005  Last data filed at 06/20/17 2033   Gross per 24 hour   Intake             1100 ml   Output                0 ml   Net             1100 ml      Physical Exam   Constitutional: She appears well-developed and well-nourished.   Cardiovascular: Normal rate, regular rhythm and normal heart sounds.  Exam reveals no gallop and no friction rub.    No murmur heard.  Pulmonary/Chest: Effort normal and breath sounds normal. No respiratory distress. She has no wheezes. She has no rales.   Abdominal: Soft. Bowel sounds are normal. She exhibits no distension. There is no tenderness.   Musculoskeletal: Normal range of motion.   Neurological: She is alert.   Follows commands. AO x 2 - person and place    Skin: Skin is warm and dry.       Significant Labs:   CBC:     Recent Labs  Lab 06/19/17 0728 06/20/17 0426   WBC 8.41 5.11   HGB 9.4* 9.0*   HCT 29.1* 28.6*    198     CMP:     Recent Labs  Lab 06/19/17  0728 06/20/17 0426    140   K 4.9 4.9   * 112*   CO2 21* 21*   * 97   BUN 21* 22*   CREATININE 1.4 1.3   CALCIUM 8.4* 8.3*   PROT 5.8* 5.9*   ALBUMIN 2.5* 2.6*   BILITOT 0.2 0.3   ALKPHOS 101 94   AST 21 25   ALT 13 13   ANIONGAP 6* 7*   EGFRNONAA 41.4*  45.3*       Significant Imaging: I have reviewed and interpreted all pertinent imaging results/findings within the past 24 hours.

## 2017-06-21 NOTE — PLAN OF CARE
Problem: SLP Goal  Goal: SLP Goal  Speech Language Pathology Goals  Goals expected to be met by 6/20    1. Pt will tolerate dental soft diet/nectar thick liquids without overt s/s of aspiration  2. Pt will tolerate thin liquid trials x10 without overt s/s of aspiration  3. Pt will complete OME x10 reps with min cues  4. Pt will complete recall tasks with 70% accuracy and min cues  5. Pt will follow 3 step commands with 75% accuracy and min cues  6. Pt will complete problem solving tasks with 80% accuracy and min cues       Outcome: Ongoing (interventions implemented as appropriate)  Pt seen with RN and  present in room. Pt with noted unthickened liquids on tray.  reported noncompliance with thickener. SLP discussed previous recommendations and s/s and risks of aspiration at length. Pt's  verbalized understanding of all discussed and agreed to be more compliant with thickener and SLP recommendations. Per RN, pt to receive a repeat CXR 2/2 increased wet breathing sounds. Discussed POC with RN and MD. ST will continue to follow.   JYOTI Akbar., CCC-SLP  06/21/2017

## 2017-06-21 NOTE — NURSING
Notified IMS of patient's BP of 183/81. ROHAN Ibarra approved early administration of hydralazine 75 mg at this time. Will continue to monitor and reassess.

## 2017-06-21 NOTE — PROGRESS NOTES
"[facility] Hospital Medicine  Progress Note    Patient Name: Lucy Perez  MRN: [id number]  Patient Class: IP- Inpatient   Admission Date: 6/6/2017  Length of Stay: 15 days  Attending Physician: Mitchel Hernandez MD  Primary Care Provider: Beverly Muniz MD    [facility] Medicine Team: [facility]OSP MED S Mitchel Hernandez MD    Subjective:     Principal Problem:Partial symptomatic epilepsy with complex partial seizures, not intractable, without status epilepticus    HPI:   Lcuy Perez is a 57 yo F with PMH of CVA in August and November with residual left sided weakness, CHF, Dm, HLD, HTN, sarcoid, Ra, CKD 3 presenting with aphasia, unresponsiveness and poor motor effort since 9 AM today. The patient's family reports that the patient has had an elevated BP to the 180s for the past week. They report that at times the patient "will be sleepy" and poorly responsive and usually improves within a few hours. However,  At about 2pm she was found to be staring up the ceiling and remained unresponsive.  The family attempted to feed the patient at lunch through her PEG tube but the patient vomited shortly thereafter.   Her baseline is described as dependent for activities of daily living, verbal and interactive with limited mobility due to left hemiparesis.   She had hemicranectomy for stroke treatment in 2016. She is currently on keppra and tegretol and per family was started post stroke for seizure prophylaxis. No history of seizure disorder per patient's daughter.        Hospital Course:  6/7 admission to [facility]  6/8 R PLED-> increased carbamazepine, levetiracetam  6/9 waxing/waning R PLED but no seizure  6/12: Patient came because of SZ. R PLEDS and slowing and no ictal activity.  6/13: MRI of the brain is stable. No acute strokes. Patient had severe tigh pain. Tegretol level 10.3. Serum Na OK. Enterbacter resistant to Ceftriaxine. Antibiotics were changed to Cipro. RLE tenderness. Dermatology " "consultation left recommendations.  6/14:No events. On Cipro for enterobacter UTI.  BP low during the night. No BM yet,    Pt stepped down from Neuro critical care on 6/16/2017. Neurology consulted at that time for continued episodes of decreased responsiveness and leftward gaze.  Neurology discussed case with  Daiana Gold and Roseanna (epilepsy).  Per note: "Patient awareness is variable with improved reactivity to environment after 30 min from previous examination yesterday evening.  Cannot rule out previous seizure but is not having continuous clinical seizures. Based on nystagmus and altered state, may actually be relatively toxic from the Tegretol.  Adequately covered for seizure with Keppra." Tegretol and Carbamezapine discontinued per recs. Pt improved to baseline mental status.     Interval History: No acute events overnight. Patient was supposed to be discharged yesterday however discharge Held due to hypoxemia which resolved today after lasix administration.     Review of Systems   Unable to perform ROS: Mental status change     Objective:     Vital Signs (Most Recent):  Temp: 98.3 °F (36.8 °C) (06/20/17 1920)  Pulse: (!) 52 (06/20/17 2300)  Resp: 18 (06/20/17 2117)  BP: (!) 168/74 (06/20/17 2213)  SpO2: 98 % (06/20/17 2117) Vital Signs (24h Range):  Temp:  [97.8 °F (36.6 °C)-98.7 °F (37.1 °C)] 98.3 °F (36.8 °C)  Pulse:  [52-80] 52  Resp:  [15-18] 18  SpO2:  [95 %-99 %] 98 %  BP: (143-183)/(64-92) 168/74     Weight: 102.1 kg (225 lb 1.4 oz)  Body mass index is 38.64 kg/m².    Intake/Output Summary (Last 24 hours) at 06/21/17 0005  Last data filed at 06/20/17 2033   Gross per 24 hour   Intake             1100 ml   Output                0 ml   Net             1100 ml      Physical Exam   Constitutional: She appears well-developed and well-nourished.   Cardiovascular: Normal rate, regular rhythm and normal heart sounds.  Exam reveals no gallop and no friction rub.    No murmur heard.  Pulmonary/Chest: Effort " normal and breath sounds normal. No respiratory distress. She has no wheezes. She has no rales.   Abdominal: Soft. Bowel sounds are normal. She exhibits no distension. There is no tenderness.   Musculoskeletal: Normal range of motion.   Neurological: She is alert.   Follows commands. AO x 2 - person and place    Skin: Skin is warm and dry.       Significant Labs:   CBC:     Recent Labs  Lab 06/19/17  0728 06/20/17  0426   WBC 8.41 5.11   HGB 9.4* 9.0*   HCT 29.1* 28.6*    198     CMP:     Recent Labs  Lab 06/19/17  0728 06/20/17  0426    140   K 4.9 4.9   * 112*   CO2 21* 21*   * 97   BUN 21* 22*   CREATININE 1.4 1.3   CALCIUM 8.4* 8.3*   PROT 5.8* 5.9*   ALBUMIN 2.5* 2.6*   BILITOT 0.2 0.3   ALKPHOS 101 94   AST 21 25   ALT 13 13   ANIONGAP 6* 7*   EGFRNONAA 41.4* 45.3*       Significant Imaging: I have reviewed and interpreted all pertinent imaging results/findings within the past 24 hours.    Assessment/Plan:      Acute cystitis    Resolved. Completed 7/7 days for  enterobacter infection.        Left ventricular diastolic dysfunction with preserved systolic function    Episode of hypoxia today; improved with lasix   -Will give additional 40 IV lasix in the AM. Will continue PO lasix thereafter           Type 2 diabetes mellitus with diabetic chronic kidney disease    Stable. Cont detemir, ISS         Essential hypertension    Still above goal at times  Cont labetalol 500 TID, cont hydralzine to 100 tid  Cont amlodipine 10 , lisinopril 10   Add lasix as noted below         * Partial symptomatic epilepsy with complex partial seizures, not intractable, without status epilepticus    Step down from Neuro critical care. Pt still with episodes of staring and decreased responsiveness 6/16. Continues to improve. Per neurology - not concerning for continuous  clinical seizures. Possible related to tegretol.   - Neurology consulted and signed off. Appreciate recs   - EEG abnormal but negative for  seizure   - continue keppra;  D/c Carbamezapine and dc tegratol per recs   - Pts mental status close to baseline per family              VTE Risk Mitigation         Ordered     heparin (porcine) injection 5,000 Units  Every 8 hours     Route:  Subcutaneous        06/06/17 2316     High Risk of VTE  Once      06/06/17 2316          Mitchel Hernandez MD  Department of Hospital Medicine   [facility]

## 2017-06-21 NOTE — PROGRESS NOTES
"[facility] Hospital Medicine  Progress Note    Patient Name: Lucy Perez  MRN: [id number]  Patient Class: IP- Inpatient   Admission Date: 6/6/2017  Length of Stay: 15 days  Attending Physician: Mitchel Hernandez MD  Primary Care Provider: Beverly Muniz MD    [facility] Medicine Team: [facility]OSP MED S Mitchel Hernandez MD    Subjective:     Principal Problem:Partial symptomatic epilepsy with complex partial seizures, not intractable, without status epilepticus    HPI:   Lucy Perez is a 55 yo F with PMH of CVA in August and November with residual left sided weakness, CHF, Dm, HLD, HTN, sarcoid, Ra, CKD 3 presenting with aphasia, unresponsiveness and poor motor effort since 9 AM today. The patient's family reports that the patient has had an elevated BP to the 180s for the past week. They report that at times the patient "will be sleepy" and poorly responsive and usually improves within a few hours. However,  At about 2pm she was found to be staring up the ceiling and remained unresponsive.  The family attempted to feed the patient at lunch through her PEG tube but the patient vomited shortly thereafter.   Her baseline is described as dependent for activities of daily living, verbal and interactive with limited mobility due to left hemiparesis.   She had hemicranectomy for stroke treatment in 2016. She is currently on keppra and tegretol and per family was started post stroke for seizure prophylaxis. No history of seizure disorder per patient's daughter.        Hospital Course:  6/7 admission to [facility]  6/8 R PLED-> increased carbamazepine, levetiracetam  6/9 waxing/waning R PLED but no seizure  6/12: Patient came because of SZ. R PLEDS and slowing and no ictal activity.  6/13: MRI of the brain is stable. No acute strokes. Patient had severe tigh pain. Tegretol level 10.3. Serum Na OK. Enterbacter resistant to Ceftriaxine. Antibiotics were changed to Cipro. RLE tenderness. Dermatology " "consultation left recommendations.  6/14:No events. On Cipro for enterobacter UTI.  BP low during the night. No BM yet,    Pt stepped down from Neuro critical care on 6/16/2017. Neurology consulted at that time for continued episodes of decreased responsiveness and leftward gaze.  Neurology discussed case with  Daiana Gold and Roseanna (epilepsy).  Per note: "Patient awareness is variable with improved reactivity to environment after 30 min from previous examination yesterday evening.  Cannot rule out previous seizure but is not having continuous clinical seizures. Based on nystagmus and altered state, may actually be relatively toxic from the Tegretol.  Adequately covered for seizure with Keppra." Tegretol and Carbamezapine discontinued per recs. Pt improved to baseline mental status. Hypoxemic, so added IV lasix and stopped fluids.     Interval History: Patient seen and examined at bedside. IV fluids discontinued due to fluid overload.     Review of Systems   Unable to perform ROS: Mental status change     Objective:     Vital Signs (Most Recent):  Temp: 98.4 °F (36.9 °C) (06/21/17 1505)  Pulse: 74 (06/21/17 1506)  Resp: 16 (06/21/17 1505)  BP: (!) 164/73 (06/21/17 1505)  SpO2: 96 % (06/21/17 1505) Vital Signs (24h Range):  Temp:  [97.1 °F (36.2 °C)-98.4 °F (36.9 °C)] 98.4 °F (36.9 °C)  Pulse:  [52-80] 74  Resp:  [16-20] 16  SpO2:  [91 %-100 %] 96 %  BP: (132-183)/(60-82) 164/73     Weight: 102.1 kg (225 lb 1.4 oz)  Body mass index is 38.64 kg/m².    Intake/Output Summary (Last 24 hours) at 06/21/17 1854  Last data filed at 06/21/17 1254   Gross per 24 hour   Intake             1440 ml   Output                0 ml   Net             1440 ml      Physical Exam   Constitutional: She appears well-developed and well-nourished.   Cardiovascular: Normal rate, regular rhythm and normal heart sounds.  Exam reveals no gallop and no friction rub.    No murmur heard.  Pulmonary/Chest: Effort normal and breath sounds normal. No " respiratory distress. She has no wheezes. She has no rales.   Abdominal: Soft. Bowel sounds are normal. She exhibits no distension. There is no tenderness.   Musculoskeletal: Normal range of motion.   Neurological: She is alert.   Follows commands. AO x 2 - person and place    Skin: Skin is warm and dry.       Significant Labs:   CBC:   Recent Labs  Lab 06/20/17  0426 06/21/17  0415   WBC 5.11 4.56   HGB 9.0* 8.2*   HCT 28.6* 26.2*    213     CMP:   Recent Labs  Lab 06/20/17  0426 06/21/17  0415    141   K 4.9 4.8   * 112*   CO2 21* 22*   GLU 97 102   BUN 22* 22*   CREATININE 1.3 1.6*   CALCIUM 8.3* 8.2*   PROT 5.9* 5.6*   ALBUMIN 2.6* 2.5*   BILITOT 0.3 0.2   ALKPHOS 94 87   AST 25 17   ALT 13 12   ANIONGAP 7* 7*   EGFRNONAA 45.3* 35.3*       Significant Imaging: I have reviewed all pertinent imaging results/findings within the past 24 hours.    Assessment/Plan:     Acute cystitis     Resolved. Completed 7/7 days for  enterobacter infection.       Left ventricular diastolic dysfunction with preserved systolic function     Episode of hypoxia today; improved with lasix   -Will give additional 40 IV lasix in the AM. Will continue PO lasix thereafter  -discontinue IV fluids            Type 2 diabetes mellitus with diabetic chronic kidney disease     Stable. Cont detemir, ISS        Essential hypertension     Still above goal at times  Cont labetalol 500 TID, cont hydralzine to 100 tid  Cont amlodipine 10 , lisinopril 10   Add lasix as noted below        * Partial symptomatic epilepsy with complex partial seizures, not intractable, without status epilepticus     Step down from Neuro critical care. Pt still with episodes of staring and decreased responsiveness 6/16. Continues to improve. Per neurology - not concerning for continuous  clinical seizures. Possible related to tegretol.   - Neurology consulted and signed off. Appreciate recs   - EEG abnormal but negative for seizure   - continue keppra;   D/c Carbamezapine and dc tegratol per recs   - Pts mental status close to baseline per family               VTE Risk Mitigation         Ordered     heparin (porcine) injection 5,000 Units  Every 8 hours     Route:  Subcutaneous        06/06/17 2316     High Risk of VTE  Once      06/06/17 2316          Mitchel Hernandez MD  Department of Hospital Medicine   [facility]

## 2017-06-22 VITALS
RESPIRATION RATE: 16 BRPM | DIASTOLIC BLOOD PRESSURE: 62 MMHG | SYSTOLIC BLOOD PRESSURE: 130 MMHG | TEMPERATURE: 98 F | BODY MASS INDEX: 38.42 KG/M2 | HEART RATE: 74 BPM | WEIGHT: 225.06 LBS | HEIGHT: 64 IN | OXYGEN SATURATION: 96 %

## 2017-06-22 LAB
ALBUMIN SERPL BCP-MCNC: 2.7 G/DL
ALP SERPL-CCNC: 90 U/L
ALT SERPL W/O P-5'-P-CCNC: 10 U/L
ANION GAP SERPL CALC-SCNC: 10 MMOL/L
AST SERPL-CCNC: 16 U/L
BASOPHILS # BLD AUTO: 0.02 K/UL
BASOPHILS NFR BLD: 0.4 %
BILIRUB SERPL-MCNC: 0.3 MG/DL
BUN SERPL-MCNC: 23 MG/DL
CALCIUM SERPL-MCNC: 8.7 MG/DL
CHLORIDE SERPL-SCNC: 112 MMOL/L
CO2 SERPL-SCNC: 21 MMOL/L
CREAT SERPL-MCNC: 1.7 MG/DL
DIFFERENTIAL METHOD: ABNORMAL
EOSINOPHIL # BLD AUTO: 0.3 K/UL
EOSINOPHIL NFR BLD: 5.5 %
ERYTHROCYTE [DISTWIDTH] IN BLOOD BY AUTOMATED COUNT: 15.1 %
EST. GFR  (AFRICAN AMERICAN): 37.8 ML/MIN/1.73 M^2
EST. GFR  (NON AFRICAN AMERICAN): 32.8 ML/MIN/1.73 M^2
GLUCOSE SERPL-MCNC: 110 MG/DL
HCT VFR BLD AUTO: 27.9 %
HGB BLD-MCNC: 8.4 G/DL
LYMPHOCYTES # BLD AUTO: 1.1 K/UL
LYMPHOCYTES NFR BLD: 24 %
MAGNESIUM SERPL-MCNC: 1.5 MG/DL
MCH RBC QN AUTO: 28.3 PG
MCHC RBC AUTO-ENTMCNC: 30.1 %
MCV RBC AUTO: 94 FL
MONOCYTES # BLD AUTO: 0.5 K/UL
MONOCYTES NFR BLD: 10.1 %
NEUTROPHILS # BLD AUTO: 2.7 K/UL
NEUTROPHILS NFR BLD: 59.8 %
PHOSPHATE SERPL-MCNC: 3.6 MG/DL
PLATELET # BLD AUTO: 226 K/UL
PMV BLD AUTO: 10.1 FL
POCT GLUCOSE: 143 MG/DL (ref 70–110)
POCT GLUCOSE: 147 MG/DL (ref 70–110)
POCT GLUCOSE: 201 MG/DL (ref 70–110)
POTASSIUM SERPL-SCNC: 4.9 MMOL/L
PROT SERPL-MCNC: 6.1 G/DL
RBC # BLD AUTO: 2.97 M/UL
SODIUM SERPL-SCNC: 143 MMOL/L
WBC # BLD AUTO: 4.55 K/UL

## 2017-06-22 PROCEDURE — 97530 THERAPEUTIC ACTIVITIES: CPT

## 2017-06-22 PROCEDURE — 92507 TX SP LANG VOICE COMM INDIV: CPT

## 2017-06-22 PROCEDURE — 27000221 HC OXYGEN, UP TO 24 HOURS

## 2017-06-22 PROCEDURE — 94640 AIRWAY INHALATION TREATMENT: CPT

## 2017-06-22 PROCEDURE — 63600175 PHARM REV CODE 636 W HCPCS: Performed by: HOSPITALIST

## 2017-06-22 PROCEDURE — 85025 COMPLETE CBC W/AUTO DIFF WBC: CPT

## 2017-06-22 PROCEDURE — 25000242 PHARM REV CODE 250 ALT 637 W/ HCPCS: Performed by: HOSPITALIST

## 2017-06-22 PROCEDURE — 83735 ASSAY OF MAGNESIUM: CPT

## 2017-06-22 PROCEDURE — 25000003 PHARM REV CODE 250: Performed by: PHYSICIAN ASSISTANT

## 2017-06-22 PROCEDURE — 99239 HOSP IP/OBS DSCHRG MGMT >30: CPT | Mod: ,,, | Performed by: HOSPITALIST

## 2017-06-22 PROCEDURE — 80053 COMPREHEN METABOLIC PANEL: CPT

## 2017-06-22 PROCEDURE — 25000242 PHARM REV CODE 250 ALT 637 W/ HCPCS: Performed by: PHYSICIAN ASSISTANT

## 2017-06-22 PROCEDURE — 94761 N-INVAS EAR/PLS OXIMETRY MLT: CPT

## 2017-06-22 PROCEDURE — 25000003 PHARM REV CODE 250: Performed by: STUDENT IN AN ORGANIZED HEALTH CARE EDUCATION/TRAINING PROGRAM

## 2017-06-22 PROCEDURE — 25000003 PHARM REV CODE 250: Performed by: HOSPITALIST

## 2017-06-22 PROCEDURE — 25000003 PHARM REV CODE 250: Performed by: NURSE PRACTITIONER

## 2017-06-22 PROCEDURE — 99900035 HC TECH TIME PER 15 MIN (STAT)

## 2017-06-22 PROCEDURE — 25000003 PHARM REV CODE 250: Performed by: PSYCHIATRY & NEUROLOGY

## 2017-06-22 PROCEDURE — 92526 ORAL FUNCTION THERAPY: CPT

## 2017-06-22 PROCEDURE — 84100 ASSAY OF PHOSPHORUS: CPT

## 2017-06-22 PROCEDURE — 97535 SELF CARE MNGMENT TRAINING: CPT

## 2017-06-22 PROCEDURE — 36415 COLL VENOUS BLD VENIPUNCTURE: CPT

## 2017-06-22 RX ORDER — MAGNESIUM SULFATE HEPTAHYDRATE 40 MG/ML
2 INJECTION, SOLUTION INTRAVENOUS ONCE
Status: COMPLETED | OUTPATIENT
Start: 2017-06-22 | End: 2017-06-22

## 2017-06-22 RX ORDER — ALBUTEROL SULFATE 0.83 MG/ML
2.5 SOLUTION RESPIRATORY (INHALATION)
Status: DISCONTINUED | OUTPATIENT
Start: 2017-06-22 | End: 2017-06-22 | Stop reason: HOSPADM

## 2017-06-22 RX ORDER — LEVETIRACETAM 500 MG/1
2000 TABLET ORAL 2 TIMES DAILY
Qty: 240 TABLET | Refills: 2 | Status: SHIPPED | OUTPATIENT
Start: 2017-06-22 | End: 2017-08-10 | Stop reason: SDUPTHER

## 2017-06-22 RX ADMIN — ALBUTEROL SULFATE 2.5 MG: 2.5 SOLUTION RESPIRATORY (INHALATION) at 12:06

## 2017-06-22 RX ADMIN — ALBUTEROL SULFATE 2.5 MG: 2.5 SOLUTION RESPIRATORY (INHALATION) at 03:06

## 2017-06-22 RX ADMIN — ALBUTEROL SULFATE 2.5 MG: 2.5 SOLUTION RESPIRATORY (INHALATION) at 07:06

## 2017-06-22 RX ADMIN — Medication 3 ML: at 02:06

## 2017-06-22 RX ADMIN — LABETALOL HCL 500 MG: 200 TABLET, FILM COATED ORAL at 06:06

## 2017-06-22 RX ADMIN — Medication 3 ML: at 06:06

## 2017-06-22 RX ADMIN — HYDRALAZINE HYDROCHLORIDE 75 MG: 50 TABLET ORAL at 06:06

## 2017-06-22 RX ADMIN — STANDARDIZED SENNA CONCENTRATE AND DOCUSATE SODIUM 1 TABLET: 8.6; 5 TABLET, FILM COATED ORAL at 09:06

## 2017-06-22 RX ADMIN — INSULIN ASPART 4 UNITS: 100 INJECTION, SOLUTION INTRAVENOUS; SUBCUTANEOUS at 01:06

## 2017-06-22 RX ADMIN — LISINOPRIL 10 MG: 10 TABLET ORAL at 09:06

## 2017-06-22 RX ADMIN — LABETALOL HCL 500 MG: 200 TABLET, FILM COATED ORAL at 01:06

## 2017-06-22 RX ADMIN — MICONAZOLE NITRATE: 20 OINTMENT TOPICAL at 09:06

## 2017-06-22 RX ADMIN — FAMOTIDINE 20 MG: 20 TABLET, FILM COATED ORAL at 06:06

## 2017-06-22 RX ADMIN — AMLODIPINE BESYLATE 10 MG: 10 TABLET ORAL at 09:06

## 2017-06-22 RX ADMIN — POLYETHYLENE GLYCOL 3350 17 G: 17 POWDER, FOR SOLUTION ORAL at 09:06

## 2017-06-22 RX ADMIN — ACETAMINOPHEN 650 MG: 325 TABLET ORAL at 04:06

## 2017-06-22 RX ADMIN — HEPARIN SODIUM 5000 UNITS: 5000 INJECTION, SOLUTION INTRAVENOUS; SUBCUTANEOUS at 01:06

## 2017-06-22 RX ADMIN — MAGNESIUM SULFATE IN WATER 2 G: 40 INJECTION, SOLUTION INTRAVENOUS at 09:06

## 2017-06-22 RX ADMIN — LEVETIRACETAM 2000 MG: 100 SOLUTION ORAL at 09:06

## 2017-06-22 RX ADMIN — HEPARIN SODIUM 5000 UNITS: 5000 INJECTION, SOLUTION INTRAVENOUS; SUBCUTANEOUS at 06:06

## 2017-06-22 RX ADMIN — ASPIRIN 81 MG CHEWABLE TABLET 81 MG: 81 TABLET CHEWABLE at 09:06

## 2017-06-22 RX ADMIN — FUROSEMIDE 40 MG: 40 TABLET ORAL at 09:06

## 2017-06-22 RX ADMIN — HYDRALAZINE HYDROCHLORIDE 75 MG: 50 TABLET ORAL at 01:06

## 2017-06-22 NOTE — PLAN OF CARE
Problem: SLP Goal  Goal: SLP Goal  Speech Language Pathology Goals  Goals expected to be met by 6/20    1. Pt will tolerate dental soft diet/nectar thick liquids without overt s/s of aspiration  2. Pt will tolerate thin liquid trials x10 without overt s/s of aspiration  3. Pt will complete OME x10 reps with min cues  4. Pt will complete recall tasks with 70% accuracy and min cues  5. Pt will follow 3 step commands with 75% accuracy and min cues  6. Pt will complete problem solving tasks with 80% accuracy and min cues       Outcome: Ongoing (interventions implemented as appropriate)  Pt seen with breakfast. Pt tolerating current diet. Pt progressing towards goals. ST will continue to follow.   JYOTI Akbar., CCC-SLP  06/22/2017

## 2017-06-22 NOTE — PT/OT/SLP PROGRESS
"Occupational Therapy  Treatment    Lucy Perez   MRN: [id number]   Admitting Diagnosis: Partial symptomatic epilepsy with complex partial seizures, not intractable, without status epilepticus    OT Date of Treatment: 06/22/17   OT Start Time: 0755  OT Stop Time: 0820  OT Total Time (min): 25 min    Billable Minutes:  Self Care/Home Management 8 minutes and Therapeutic Activity 17 minutes    General Precautions: Standard, aspiration, fall    Do you have any cultural, spiritual, Worship conflicts, given your current situation?: n/a    Subjective:  Communicated with RN prior to session.  "My leg."  Pain/Comfort  Pain Rating 1: 0/10  Pain Addressed 1: Reposition, Cessation of Activity  Pain Rating Post-Intervention 1: 0/10 (pt screamed in pain with sit to supine and c/o L LE pain, which subsided after repositioning in bed)    Objective:  Patient found with: telemetry, bed alarm, pt found supine with HOB elevated and agreeable to therapy.     Functional Mobility:  Bed Mobility:  Rolling/Turning Right: Total assistance  Scooting/Bridging: Total Assistance, With assist of 2  Supine to Sit: Total Assistance  Sit to Supine: Total Assistance, With assist of 2    Transfers:  Sit <> Stand Assistance: Total Assistance  Sit <> Stand Assistive Device: No Assistive Device    Functional Ambulation: did not occur; Total A to "squat stand" and just barely cleared hips from bed, with increased trunk flexion and pain in L LE.    Activities of Daily Living:    Grooming Position: Seated, EOB  Grooming Level of Assistance: Maximum assistance (pt required Max A to maintain sitting balance, and to raise arm to face, but was able to manipulate fingers with washcloth setup to wipe face)    Balance:   Static Sit: 0: Needs MAXIMAL assist to maintain sitting without back support  Dynamic Sit: POOR: N/A  Static Stand: 0: Needs MAXIMAL assist to maintain   Dynamic stand: 0: N/A    Therapeutic Activities and Exercises:  Pt ed re OT role " "and POC. Pt performed supine to sit and scoot to EOB. Pt performed grooming task of washing face. Pt performed WB activity on L hand and OT provided overpressure to L LE while seated EOB. Pt performed "squat stand" from EOB with Total A and required Total A to maintain. Pt required Total Ax2 to return to supine and scoot to HOB. Spouse ed re gentle PROM and rolled towel to prevent L hand contracture.    AM-PAC 6 CLICK ADL   How much help from another person does this patient currently need?   1 = Unable, Total/Dependent Assistance  2 = A lot, Maximum/Moderate Assistance  3 = A little, Minimum/Contact Guard/Supervision  4 = None, Modified [place]/Independent    Putting on and taking off regular lower body clothing? : 1  Bathing (including washing, rinsing, drying)?: 1  Toileting, which includes using toilet, bedpan, or urinal? : 1  Putting on and taking off regular upper body clothing?: 1  Taking care of personal grooming such as brushing teeth?: 2  Eating meals?: 2  Total Score: 8     AM-PAC Raw Score CMS "G-Code Modifier Level of Impairment Assistance   6 % Total / Unable   7 - 8 CM 80 - 100% Maximal Assist   9-13 CL 60 - 80% Moderate Assist   14 - 19 CK 40 - 60% Moderate Assist   20 - 22 CJ 20 - 40% Minimal Assist   23 CI 1-20% SBA / CGA   24 CH 0% Independent/ Mod I       Patient left HOB elevated with all lines intact, call button in reach and  present    ASSESSMENT:  Lucy Perez is a 56 y.o. female with a medical diagnosis of Partial symptomatic epilepsy with complex partial seizures, not intractable, without status epilepticus and presents with the impairments listed below. Pt participates well, but currently still requiring Total A for bed mobility and all self care tasks. Pt required Max/Total A to maintain balance while seated EOB, but could range to SBA for ~10 seconds at a time with R UE on side rail, then regressed back to Min A - Max A depending on positioning of RUE. Pt " encouraged to perform grooming task, but required Total A/ [place]. Pt would benefit from cont OT to improve self care skills.    Rehab identified problem list/impairments: Rehab identified problem list/impairments: weakness, impaired endurance, impaired self care skills, impaired functional mobilty, gait instability, impaired balance, impaired cognition, decreased upper extremity function, decreased lower extremity function, decreased safety awareness, pain, abnormal tone, decreased ROM, impaired coordination, impaired fine motor, impaired skin, edema, impaired joint extensibility, impaired muscle length    Rehab potential is fair.    Activity tolerance: Fair    Discharge recommendations: Discharge Facility/Level Of Care Needs: home with home health and continued 24/7 care    Barriers to discharge: Barriers to Discharge: None    Equipment recommendations:  (mobile shower chair)     GOALS:    Occupational Therapy Goals        Problem: Occupational Therapy Goal    Goal Priority Disciplines Outcome Interventions   Occupational Therapy Goal     OT, PT/OT Ongoing (interventions implemented as appropriate)    Description:  Goals to be met by: 6/30/17     Patient will increase functional independence with ADLs by performing:    Feeding with Minimal Assistance.  Grooming while EOB with Moderate Assistance.  Sitting at edge of bed x15 minutes with Contact Guard Assistance.  Rolling to Left with Minimal Assistance and use of bedrail as needed.   Rolling to Right with Maximum Assistance.   Supine to sit with Moderate Assistance.                      Plan:  Patient to be seen 3 x/week to address the above listed problems via self-care/home management, therapeutic activities, therapeutic exercises, neuromuscular re-education, cognitive retraining, wheelchair management/training  Plan of Care expires: 07/16/17  Plan of Care reviewed with: patient, spouse    CARLYLE Galeano  6/22/2017  Pager: [phone]

## 2017-06-22 NOTE — PLAN OF CARE
Weaning oxygen. Cm met with pt and . Dc today w hh. Preferred choice [facility]. Will need stretcher/amb transport/poss oxgyen  WANDY Sam notified of hh/transport needs    F/U appt with PCC made on 6/29  Medicare rights reviewed w  w verb back.     06/22/17 1147   Final Note   Assessment Type Final Discharge Note   Discharge Disposition Home-Health   Discharge planning education complete? Yes   Hospital Follow Up  Appt(s) scheduled? Yes   Discharge plans and expectations educations in teach back method with documentation complete? Yes   Offered [facility] -- Bedside Delivery? n/a   Discharge/Hospital Encounter Summary to (non-[facility]) PCP n/a   Referral to Outpatient Case Management complete? n/a   Referral to / orders for Home Health Complete? Yes   30 day supply of medicines given at discharge, if documented non-compliance / non-adherence? No   Any social issues identified prior to discharge? Yes   Did you assess the readiness or willingness of the family or caregiver to support self management of care? Yes   Right Care Referral Info   Post Acute Recommendation Home-care     Future Appointments  Date Time Provider Department Center   6/29/2017 12:00 PM PHYSICIAN, [facility] [place]   7/14/2017 10:40 AM Pola Galloway MD [facility] [place]   7/31/2017 1:15 PM YOVANI Kate POD [facility]     14:33 HH orders written. WANDY Sam notified to send to [facility], pt's preference.

## 2017-06-22 NOTE — DISCHARGE SUMMARY
[facility]    HOME HEALTH ORDERS  FACE TO FACE ENCOUNTER    Patient Name: Lucy Perez  YOB: 1960    PCP: Beverly Muniz MD   PCP Address: [address] / [place] [address]  PCP Phone Number: [phone]  PCP Fax: [phone]    Encounter Date: 06/22/2017    Admit to Home Health    Diagnoses:  Active Hospital Problems    Diagnosis  POA    *Partial symptomatic epilepsy with complex partial seizures, not intractable, without status epilepticus [G40.209]  Yes    Raul coma scale total score 9-12 [R40.2420]  No    Anemia of chronic disease [D63.8]  No    Seizure disorder [G40.909]  Yes    Acute cystitis [N30.00]  Yes    Intertrigo [L30.4]  Yes    Constipation [K59.00]  Yes    Aphasia [R47.01]  Yes    Altered mental status [R41.82]  Yes    Left ventricular diastolic dysfunction with preserved systolic function [I51.9]  Yes    Type 2 diabetes mellitus with diabetic chronic kidney disease [E11.22]  Yes    CKD (chronic kidney disease) stage 4, GFR 15-29 ml/min [N18.4]  Yes    Hyperlipidemia [E78.5]  Yes    Essential hypertension [I10]  Yes     Will resume home medications: amlodipine, labetalol, and hydralazine  Will hold Lisinopril in setting of DARLING.        Resolved Hospital Problems    Diagnosis Date Resolved POA   No resolved problems to display.       Future Appointments  Date Time Provider Department Center   6/29/2017 12:00 PM PHYSICIAN, [facility]o Jonesgina [facility]   7/14/2017 10:40 AM Pola Galloway MD [facility] [place]   7/31/2017 1:15 PM Fior Ricks DPM [facility] POD [facility] [place]     Follow-up Information     Beverly Muniz MD In 1 week.    Specialty:  Internal Medicine  Contact information:  [address]  [place] [address]  [phone]                   I have seen and examined this patient face to face today. My clinical findings that support the need for the home health skilled services and home  bound status are the following:  Weakness/numbness causing balance and gait disturbance due to Stroke making it taxing to leave home.    Allergies:  Review of patient's allergies indicates:   Allergen Reactions    Vicodin [hydrocodone-acetaminophen] Rash       Diet: dental soft diet with nectar thickened liquids   Patient may be given meds through PEG.    Activities: activity as tolerated    Nursing:   SN to complete comprehensive assessment including routine vital signs. Instruct on disease process and s/s of complications to report to MD. Review/verify medication list sent home with the patient at time of discharge  and instruct patient/caregiver as needed. Frequency may be adjusted depending on start of care date.    Notify MD if SBP > 160 or < 90; DBP > 90 or < 50; HR > 120 or < 50; Temp > 101    CONSULTS:    Physical Therapy to evaluate and treat. Evaluate for home safety and equipment needs; Establish/upgrade home exercise program. Perform / instruct on therapeutic exercises, gait training, transfer training, and Range of Motion.  Occupational Therapy to evaluate and treat. Evaluate home environment for safety and equipment needs. Perform/Instruct on transfers, ADL training, ROM, and therapeutic exercises.  Speech Therapy  to evaluate and treat for  Language and Swallowing.   to evaluate for community resources/long-range planning.    MISCELLANEOUS CARE:  PEG Care:  Instruct patient/caregiver to clean site.  Monitor skin integrity.  Routine Skin for Bedridden Patients: Instruct patient/caregiver to apply moisture barrier cream to all skin folds and wet areas in perineal area daily and after baths and all bowel movements.  Home Oxygen:  Oxygen at 1 L/min nasal canula to be used:  Continuously and As needed for SOB.      Medications: Review discharge medications with patient and family and provide education.      Current Discharge Medication List      CONTINUE these medications which have CHANGED  "   Details   levetiracetam (KEPPRA) 500 MG Tab Take 4 tablets (2,000 mg total) by mouth 2 (two) times daily.  Qty: 240 tablet, Refills: 2    Associated Diagnoses: Hemorrhagic cerebrovascular accident (CVA)         CONTINUE these medications which have NOT CHANGED    Details   albuterol (PROVENTIL) 2.5 mg /3 mL (0.083 %) nebulizer solution Take 3 mLs (2.5 mg total) by nebulization every 6 (six) hours as needed for Wheezing.  Qty: 50 each, Refills: 0    Associated Diagnoses: Bronchitis      alprazolam (XANAX) 0.5 MG tablet Take 1 tablet (0.5 mg total) by mouth as directed. 1/2 to 1 tab Q8 hours as needed for anxiety  Qty: 30 tablet, Refills: 0    Associated Diagnoses: Anxiety      amlodipine (NORVASC) 10 MG tablet Take 1 tablet (10 mg total) by mouth once daily.  Qty: 90 tablet, Refills: 2    Associated Diagnoses: Essential hypertension      aspirin (ECOTRIN) 81 MG EC tablet Take 81 mg by mouth once daily.        atorvastatin (LIPITOR) 40 MG tablet Take 1 tablet (40 mg total) by mouth once daily. For Cholesterol  Qty: 90 tablet, Refills: 2    Associated Diagnoses: Pure hypercholesterolemia      baclofen (LIORESAL) 20 MG tablet Take 1 tablet (20 mg total) by mouth 3 (three) times daily.  Qty: 90 tablet, Refills: 11      BD INSULIN PEN NEEDLE UF SHORT 31 gauge x 5/16" Ndle USE TO INJECT NOVOLOG FLEXPEN BEFORE MEALS  Qty: 150 each, Refills: 11      blood sugar diagnostic (ACCU-CHEK SMARTVIEW TEST STRIP) Strp 1 strip by Misc.(Non-Drug; Combo Route) route 2 (two) times daily.  Qty: 300 each, Refills: 3    Associated Diagnoses: Type 2 diabetes mellitus with chronic kidney disease      clotrimazole-betamethasone 1-0.05% (LOTRISONE) cream Apply topically 2 (two) times daily. Apply to area of rash/iting on buttocks 1-2x/day as needed  Qty: 45 g, Refills: 0    Associated Diagnoses: Rash      colchicine 0.6 mg tablet 1 tab daily as needed for gout flare  Qty: 30 tablet, Refills: 1    Associated Diagnoses: Gout, arthritis    "   famotidine (PEPCID) 20 MG tablet TAKE 1 TABLET (20 MG TOTAL) BY MOUTH ONCE DAILY.  Qty: 90 tablet, Refills: 3    Associated Diagnoses: Hemorrhagic cerebrovascular accident (CVA)      furosemide (LASIX) 40 MG tablet Take 1 tablet (40 mg total) by mouth once daily.  Qty: 90 tablet, Refills: 2    Associated Diagnoses: Essential hypertension      gabapentin (NEURONTIN) 100 MG capsule 1 in AM; 3 caps at Bedtime  Qty: 360 capsule, Refills: 3    Associated Diagnoses: Degeneration of lumbar or lumbosacral intervertebral disc      hydrALAZINE (APRESOLINE) 100 MG tablet Take 1 tablet (100 mg total) by mouth 3 (three) times daily.  Qty: 270 tablet, Refills: 3    Associated Diagnoses: Essential hypertension      insulin detemir (LEVEMIR FLEXTOUCH) 100 unit/mL (3 mL) SubQ InPn pen 16 units in AM and 10 units in the PM for Diabetes  Qty: 2 Box, Refills: 0    Associated Diagnoses: Type II or unspecified type diabetes mellitus without mention of complication, not stated as uncontrolled      labetalol (NORMODYNE) 100 MG tablet 5 tablets (500 mg total) by Per G Tube route every 8 (eight) hours.  Qty: 1350 tablet, Refills: 3    Associated Diagnoses: Essential hypertension      lisinopril 10 MG tablet Take 1 tablet (10 mg total) by mouth once daily.  Qty: 30 tablet, Refills: 3    Associated Diagnoses: Essential hypertension      multivitamin with iron Tab 1/day  Qty: 30 each, Refills: prn      nut.tx.gluc.intol,lac-free,soy (GLUCERNA 1.5 NOAH) Liqd 1.5 cans in AM, 1 can at Noon , 1.5cans at Dinner, and 1 can before Bed/Total 5 cans daily  Qty: 150 Can, Refills: 0    Associated Diagnoses: Dysphagia, unspecified type; Nontraumatic subcortical hemorrhage of right cerebral hemisphere         STOP taking these medications       carbamazepine (TEGRETOL) 200 mg tablet Comments:   Reason for Stopping:         ciprofloxacin HCl (CILOXAN) 0.3 % ophthalmic solution Comments:   Reason for Stopping:         dantrolene (DANTRIUM) 50 MG Cap  Comments:   Reason for Stopping:         fluoxetine (PROZAC) 20 MG tablet Comments:   Reason for Stopping:         pantoprazole (PROTONIX) 40 MG tablet Comments:   Reason for Stopping:         sodium polystyrene (KAYEXALATE) 15 gram/60 mL Susp Comments:   Reason for Stopping:         urea 20 % Crea Comments:   Reason for Stopping:             I certify that this patient is confined to her home and needs intermittent skilled nursing care, physical therapy, speech therapy and occupational therapy.

## 2017-06-22 NOTE — PLAN OF CARE
Transportation taken out of pending with Noel ([id number]).  time within the hour.  [facility] updated.    Cecy Romano LMSW  [id number]

## 2017-06-22 NOTE — PROGRESS NOTES
Progress Note  Hospital Medicine    Provider team: [facility]  Admit Date: 6/6/2017  Encounter Date: 06/22/2017     SUBJECTIVE:     Follow-up Visit for: Partial symptomatic epilepsy with complex partial seizures, not intractable, without status epilepticus    HPI (See H&P for complete P,F,SHx): 56F s/p R-MCA stroke with R-putaminal hemorrhagic transformation in 8/2016 and 11/2016 (s/p hemicraniotomy at [facility]) with residual L hemiparesis, on AED s/p CVA (keppra/tegretol), PEG status, chronic diastolic heart failure, DM2, HTN, HLD, sarcoidosis, RA, CKDIII A/W periodic lateralized epileptiform discharges. Neurology has been consulted for assistance.  There was concern for toxicity from tegretol itself, and the patient was continued just on keppra for seizure prophylaxis. Patients course in hospital has been complicated by acute cystitis without hematuria, s/p completion of abx for Enterobacter infection.  Course has also been c/b acute on chronic hypoxemic RF that initially had improved with lasix, though there is concern for aspiration event (CXR negative). Patient and  report she has required oxygen prior and is on home O2 at 1 L/pm.    Interval history:  Spoke with Dr. Hernandez.  Patient was planned for discharge yesterday, but had concern for aspiration event.  Per Dr. Hernandez, family was not necessarily strict with regards to following dental soft and nectar thick diet.  I saw patient with speech in room earlier and they were instructing  on appropriate diet.  Patient expresses no concern, asks to go home.  to supply home oxygen for discharge.    Review of Systems:  Review of Systems   Constitutional: Negative for chills and fever.   HENT: Negative for congestion and sore throat.    Eyes: Negative for photophobia, pain and discharge.   Respiratory: Negative for cough, hemoptysis, sputum production and shortness of breath.    Cardiovascular: Negative for chest pain, palpitations and leg  "swelling.   Gastrointestinal: Negative for abdominal pain, diarrhea, nausea and vomiting.   Genitourinary: Negative for dysuria and urgency.   Musculoskeletal: Negative for myalgias and neck pain.   Skin: Negative for itching and rash.   Neurological: Negative for sensory change, focal weakness and headaches.   Endo/Heme/Allergies: Negative for polydipsia. Does not bruise/bleed easily.   Psychiatric/Behavioral: Negative for depression and suicidal ideas.       OBJECTIVE:     Intake/Output Summary (Last 24 hours) at 06/22/17 0808  Last data filed at 06/22/17 0025   Gross per 24 hour   Intake             1420 ml   Output                0 ml   Net             1420 ml     Vital Signs Range (Last 24H):  Temp:  [98.2 °F (36.8 °C)-98.8 °F (37.1 °C)]   Pulse:  [66-81]   Resp:  [16-20]   BP: (143-221)/(62-94)   SpO2:  [91 %-98 %]   Body mass index is 38.64 kg/m².    Objective:  General Appearance:  Comfortable, well-appearing, in no acute distress and not in pain.    Vital signs: (most recent): Blood pressure (!) 169/70, pulse 75, temperature 98.7 °F (37.1 °C), temperature source Oral, resp. rate 16, height 5' 4" (1.626 m), weight 102.1 kg (225 lb 1.4 oz), SpO2 (!) 93 %, not currently breastfeeding.  No fever.    Output: Producing urine and producing stool.    HEENT: Normal HEENT exam.    Lungs:  Normal effort.  There are rhonchi.    Heart: Normal rate.  Regular rhythm.  S1 normal and S2 normal.  No murmur.   Chest: Symmetric chest wall expansion.   Abdomen: Abdomen is soft.  (PEG c/d/i).  Bowel sounds are normal.   There is no abdominal tenderness.     Extremities: Normal range of motion.  There is no venous stasis or dependent edema.    Pulses: Distal pulses are intact.    Neurological: Patient is alert and oriented to person, place and time.  (L hemiparesis noted).    Pupils:  Pupils are equal, round, and reactive to light.    Skin:  Warm and dry.          Medications:  Medication list was reviewed in EPIC and changes " noted under Assessment/Plan and MAR.    Laboratory:  Recent Labs      06/22/17   0357   WBC  4.55   RBC  2.97*   HGB  8.4*   HCT  27.9*   PLT  226   MCV  94   MCH  28.3   MCHC  30.1*   GRAN  59.8  2.7   LYMPH  24.0  1.1   MONO  10.1  0.5   EOS  0.3      Recent Labs      06/22/17   0357   GLU  110   NA  143   K  4.9   CL  112*   CO2  21*   BUN  23*   CREATININE  1.7*   CALCIUM  8.7   ANIONGAP  10   MG  1.5*   PHOS  3.6       ASSESSMENT/PLAN:     Active Hospital Problems    Diagnosis  POA    *Partial symptomatic epilepsy with complex partial seizures, not intractable, without status epilepticus [G40.209]  Yes    Raul coma scale total score 9-12 [R40.2420]  No    Anemia of chronic disease [D63.8]  No    Seizure disorder [G40.909]  Yes    Acute cystitis [N30.00]  Yes    Intertrigo [L30.4]  Yes    Constipation [K59.00]  Yes    Aphasia [R47.01]  Yes    Altered mental status [R41.82]  Yes    Left ventricular diastolic dysfunction with preserved systolic function [I51.9]  Yes    Type 2 diabetes mellitus with diabetic chronic kidney disease [E11.22]  Yes    CKD (chronic kidney disease) stage 4, GFR 15-29 ml/min [N18.4]  Yes    Hyperlipidemia [E78.5]  Yes    Essential hypertension [I10]  Yes     Will resume home medications: amlodipine, labetalol, and hydralazine  Will hold Lisinopril in setting of DARLING.        Resolved Hospital Problems    Diagnosis Date Resolved POA   No resolved problems to display.      Acute on chronic hypoxemic RF  Acute on chronic diastolic heart failure  Oropharyngeal dysphagia, with possible aspiration  - Wean O2, per  - patent on home O2 requiring 1 L/min; patient therefore at baseline  - C/w PO lasix  - Aspiration precautions, per SLP: dental soft diet with nectar thick liquids    Partial symptomatic epilepsy with complex partial seizures, not intractable, without status epilepticus  - Mental status close to baseline  - C/w keppra    Type 2 DM with CKDIII with long term  use insulin  - C/w SSI  - Avoid nephrotoxic agents  - There is a slight increase in Cr though patient is generally at baseline    Essential HTN  - C/w present regimen    DVT/GI PPx in place.    Anticipated discharge date and disposition:   Patient is stable for d/c home with home health today. 45 minutes spent on discharge planning and counseling alone.  Jerry Weir MD  [facility]

## 2017-06-22 NOTE — PLAN OF CARE
Problem: Patient Care Overview  Goal: Plan of Care Review  Outcome: Ongoing (interventions implemented as appropriate)  POC reviewed with pt and family at 1400. Blood sugar management.  MG IVPB.  Ween O2. Pt needs reinforcement. Questions and concerns addressed. No acute events today. Pt progressing toward goals. Will continue to monitor. See flowsheets for full assessment and VS info.

## 2017-06-22 NOTE — DISCHARGE SUMMARY
Discharge Summary  Hospital Medicine    Attending Provider on Discharge: Jerry Weir     Discharging Team: [facility] [place] [facility]     Date of Admission:  6/6/2017         Date of Discharge: 6/22/2017       Diagnoses:     Principal Problem(s):   Partial symptomatic epilepsy with complex partial seizures, not intractable, without status epilepticus     Secondary Problems:  Active Hospital Problems    Diagnosis    *Partial symptomatic epilepsy with complex partial seizures, not intractable, without status epilepticus    [place] coma scale total score 9-12    Anemia of chronic disease    Seizure disorder    Acute cystitis    Intertrigo    Constipation    Aphasia    Altered mental status    Left ventricular diastolic dysfunction with preserved systolic function    Type 2 diabetes mellitus with diabetic chronic kidney disease    CKD (chronic kidney disease) stage 4, GFR 15-29 ml/min    Hyperlipidemia    Essential hypertension     Will resume home medications: amlodipine, labetalol, and hydralazine  Will hold Lisinopril in setting of DARLING.          Hospital Course:       56F s/p R-MCA stroke with R-putaminal hemorrhagic transformation in 8/2016 and 11/2016 (s/p hemicraniotomy at [facility]) with residual L hemiparesis, on AED s/p CVA (keppra/tegretol), PEG status, chronic diastolic heart failure, DM2, HTN, HLD, sarcoidosis, RA, CKDIII A/W periodic lateralized epileptiform discharges. Neurology has been consulted for assistance.  There was concern for toxicity from tegretol itself, and the patient was continued just on keppra for seizure prophylaxis. Patients course in hospital has been complicated by acute cystitis without hematuria, s/p completion of abx for Enterobacter infection.  Course has also been c/b acute on chronic hypoxemic RF that initially had improved with lasix, though there is concern for aspiration event (CXR negative). Patient and  report she has required oxygen prior and is on home O2 at 1  L/pm. See below for further details:       Acute on chronic hypoxemic RF  Acute on chronic diastolic heart failure  Oropharyngeal dysphagia, with possible aspiration  - Wean O2, per  - patent on home O2 requiring 1 L/min; patient therefore at baseline  - C/w PO lasix  - Aspiration precautions, per SLP: dental soft diet with nectar thick liquids     Partial symptomatic epilepsy with complex partial seizures, not intractable, without status epilepticus  - Mental status close to baseline  - C/w keppra     Type 2 DM with CKDIII with long term use insulin  - C/w SSI  - Avoid nephrotoxic agents  - There is a slight increase in Cr though patient is generally at baseline     Essential HTN  - C/w present regimen    Significant Diagnostic Studies:   Labs:   Recent Labs      06/22/17   0357   WBC  4.55   RBC  2.97*   HGB  8.4*   HCT  27.9*   PLT  226   MCV  94   MCH  28.3   MCHC  30.1*   GRAN  59.8  2.7   LYMPH  24.0  1.1   MONO  10.1  0.5   EOS  0.3      Recent Labs      06/22/17   0357   GLU  110   NA  143   K  4.9   CL  112*   CO2  21*   BUN  23*   CREATININE  1.7*   CALCIUM  8.7   ANIONGAP  10   MG  1.5*   PHOS  3.6        Microbiology:   Blood Culture, Routine   Date Value Ref Range Status   09/11/2010 NO GROWTH AFTER 5 DAYS  - FINAL REPORT.  Final   09/11/2010 NO GROWTH AFTER 5 DAYS  - FINAL REPORT.  Final     Urine Culture, Routine   Date Value Ref Range Status   06/07/2017 ENTEROBACTER CLOACAE COMPLEX  >100,000 cfu/ml    Final     Aerobic Bacterial Culture   Date Value Ref Range Status   11/17/2005   Final    MODERATE/ HEAVY -METHICILLIN RESISTANT STAPHYLOCOCCUS AUREUS  Ampicillin                      RESISTANT     Augmentin                       RESISTANT     Bactrim                         SENSITIVE     Cefazolin                       RESISTANT     Gatifloxacin                    SENSITIVE     Oxacillin                       RESISTANT     Penicillin                      RESISTANT     Rifampin                         SENSITIVE     Tetracycline                    SENSITIVE     Vancomycin                      SENSITIVE          Anaerobic Culture   Date Value Ref Range Status   11/17/2005 NO ANEROBES FOUND  Final       Radiology:   Results for orders placed during the hospital encounter of 06/06/17   X-Ray Chest 1 View    Narrative Single view obtained and compared to the radiograph performed 2 days prior.  There has not been a significant interval change in the appearance of the chest.    Impression  Stable radiograph.      Electronically signed by: Wanda Benito MD  Date:     06/21/17  Time:    15:24       Results for orders placed during the hospital encounter of 02/28/16   X-Ray Chest PA And Lateral    Narrative PA and lateral views of the chest    Comparison: 2/28/16    Results:    Continued although slightly reduced ill-defined perihilar and bilateral lung opacities concerning for evolving vascular congestion and edema.  No pneumothorax.  There is slight opacity along the major fissure on the lateral view which may be atelectasis or small fusion.  Atherosclerotic aorta.  Continued small lung volumes with prominence of the cardiac silhouette.  Visualized osseous structures grossly intact.  Clinical correlation and follow-up advised    Impression   See above      Electronically signed by: SHIRLEY ATWOOD DO  Date:     03/01/16  Time:    11:19       No results found for this or any previous visit.   Results for orders placed during the hospital encounter of 06/06/17   CT Brain Without Contrast    Narrative Clinical indication: Stroke.    Comparison: CT head 11/3/16.    Technique: 5-mm axial images of the head were performed without the administration of contrast.  Sagittal and coronal reformatted images were also obtained.    Findings:  The patient has undergone interval right cranioplasty. There is persistent hypoattenuation within the right cerebral hemisphere including the basal ganglia which correlates with patient's  history of remote hemorrhagic infarct. Small focus of hypoattenuation in the left corona radiata was not seen on the prior exam and likely represents an interim lacunar infarct. Generalized cerebral volume loss with compensatory enlargement of the ventricles slightly advanced for the patient's age. No hydrocephalus or midline shift.  Vascular calcifications are present.     The visualized paranasal sinuses demonstrate no significant abnormality. The mastoid air cells demonstrate mild fluid bilaterally.  The osseous structures show postoperative changes as above.  The surrounding soft tissues are unremarkable.    Impression    Interval cranioplasty in this patient with a history of right cerebral hemorrhagic infarct. No acute major territorial infarction or hemorrhage.    Left corona radiata lacunar infarct, likely remote although new since the prior exam.    Senescent changes.  ______________________________________     Electronically signed by resident: TAM TORRES MD  Date:     06/06/17  Time:    22:08            As the supervising and teaching physician, I personally reviewed the images and resident's interpretation and I agree with the findings.          Electronically signed by: ASHLEY GONZALEZ MD  Date:     06/06/17  Time:    22:48         Cardiac Studies: None    Significant Treatments/Procedures:   None    Consults while in Hospital:   Neurology, Pulmonary/Intensive care and Rehabilitation Medicine    Discharge Medications:      Current Discharge Medication List      CONTINUE these medications which have CHANGED    Details   levetiracetam (KEPPRA) 500 MG Tab Take 4 tablets (2,000 mg total) by mouth 2 (two) times daily.  Qty: 240 tablet, Refills: 2    Associated Diagnoses: Hemorrhagic cerebrovascular accident (CVA)         CONTINUE these medications which have NOT CHANGED    Details   albuterol (PROVENTIL) 2.5 mg /3 mL (0.083 %) nebulizer solution Take 3 mLs (2.5 mg total) by nebulization every 6 (six) hours as  "needed for Wheezing.  Qty: 50 each, Refills: 0    Associated Diagnoses: Bronchitis      alprazolam (XANAX) 0.5 MG tablet Take 1 tablet (0.5 mg total) by mouth as directed. 1/2 to 1 tab Q8 hours as needed for anxiety  Qty: 30 tablet, Refills: 0    Associated Diagnoses: Anxiety      amlodipine (NORVASC) 10 MG tablet Take 1 tablet (10 mg total) by mouth once daily.  Qty: 90 tablet, Refills: 2    Associated Diagnoses: Essential hypertension      aspirin (ECOTRIN) 81 MG EC tablet Take 81 mg by mouth once daily.        atorvastatin (LIPITOR) 40 MG tablet Take 1 tablet (40 mg total) by mouth once daily. For Cholesterol  Qty: 90 tablet, Refills: 2    Associated Diagnoses: Pure hypercholesterolemia      baclofen (LIORESAL) 20 MG tablet Take 1 tablet (20 mg total) by mouth 3 (three) times daily.  Qty: 90 tablet, Refills: 11      BD INSULIN PEN NEEDLE UF SHORT 31 gauge x 5/16" Ndle USE TO INJECT NOVOLOG FLEXPEN BEFORE MEALS  Qty: 150 each, Refills: 11      blood sugar diagnostic (ACCU-CHEK SMARTVIEW TEST STRIP) Strp 1 strip by Misc.(Non-Drug; Combo Route) route 2 (two) times daily.  Qty: 300 each, Refills: 3    Associated Diagnoses: Type 2 diabetes mellitus with chronic kidney disease      clotrimazole-betamethasone 1-0.05% (LOTRISONE) cream Apply topically 2 (two) times daily. Apply to area of rash/iting on buttocks 1-2x/day as needed  Qty: 45 g, Refills: 0    Associated Diagnoses: Rash      colchicine 0.6 mg tablet 1 tab daily as needed for gout flare  Qty: 30 tablet, Refills: 1    Associated Diagnoses: Gout, arthritis      famotidine (PEPCID) 20 MG tablet TAKE 1 TABLET (20 MG TOTAL) BY MOUTH ONCE DAILY.  Qty: 90 tablet, Refills: 3    Associated Diagnoses: Hemorrhagic cerebrovascular accident (CVA)      furosemide (LASIX) 40 MG tablet Take 1 tablet (40 mg total) by mouth once daily.  Qty: 90 tablet, Refills: 2    Associated Diagnoses: Essential hypertension      gabapentin (NEURONTIN) 100 MG capsule 1 in AM; 3 caps at " Bedtime  Qty: 360 capsule, Refills: 3    Associated Diagnoses: Degeneration of lumbar or lumbosacral intervertebral disc      hydrALAZINE (APRESOLINE) 100 MG tablet Take 1 tablet (100 mg total) by mouth 3 (three) times daily.  Qty: 270 tablet, Refills: 3    Associated Diagnoses: Essential hypertension      insulin detemir (LEVEMIR FLEXTOUCH) 100 unit/mL (3 mL) SubQ InPn pen 16 units in AM and 10 units in the PM for Diabetes  Qty: 2 Box, Refills: 0    Associated Diagnoses: Type II or unspecified type diabetes mellitus without mention of complication, not stated as uncontrolled      labetalol (NORMODYNE) 100 MG tablet 5 tablets (500 mg total) by Per G Tube route every 8 (eight) hours.  Qty: 1350 tablet, Refills: 3    Associated Diagnoses: Essential hypertension      lisinopril 10 MG tablet Take 1 tablet (10 mg total) by mouth once daily.  Qty: 30 tablet, Refills: 3    Associated Diagnoses: Essential hypertension      multivitamin with iron Tab 1/day  Qty: 30 each, Refills: prn      nut.tx.gluc.intol,lac-free,soy (GLUCERNA 1.5 NOAH) Liqd 1.5 cans in AM, 1 can at Noon , 1.5cans at Dinner, and 1 can before Bed/Total 5 cans daily  Qty: 150 Can, Refills: 0    Associated Diagnoses: Dysphagia, unspecified type; Nontraumatic subcortical hemorrhage of right cerebral hemisphere         STOP taking these medications       carbamazepine (TEGRETOL) 200 mg tablet Comments:   Reason for Stopping:         ciprofloxacin HCl (CILOXAN) 0.3 % ophthalmic solution Comments:   Reason for Stopping:         dantrolene (DANTRIUM) 50 MG Cap Comments:   Reason for Stopping:         fluoxetine (PROZAC) 20 MG tablet Comments:   Reason for Stopping:         pantoprazole (PROTONIX) 40 MG tablet Comments:   Reason for Stopping:         sodium polystyrene (KAYEXALATE) 15 gram/60 mL Susp Comments:   Reason for Stopping:         urea 20 % Crea Comments:   Reason for Stopping:                Discharge Diet:Dental soft diet with nectar thickened  liquids.  Patient may be given meds through PEG.    Activity: activity as tolerated    Discharged Condition: Stable    Discharge Disposition: Home-Health Care [facility]    Follow-up:   Future Appointments  Date Time Provider Department Center   6/29/2017 12:00 PM PHYSICIAN[facility] Rayo Britt PCW   7/14/2017 10:40 AM Pola Galloway MD [facility]   7/31/2017 1:15 PM Fior Ricks DPM [facility]       Studies/Results pending on discharge:   None    Jerry Weir MD  [facility]

## 2017-06-22 NOTE — PLAN OF CARE
"Problem: Patient Care Overview  Goal: Plan of Care Review  Outcome: Ongoing (interventions implemented as appropriate)  Pt d/o to place and situation. Pt verbalized "home" when asked where she was. BP was 221/94 max during shift. No seizure activity noted. Pt c/o left leg pain, PRN tylenol administered. Pt remained free of falls/trauma/injury during shift. Will continue to monitor and reassess.       "

## 2017-06-22 NOTE — PLAN OF CARE
"MSW placed transportation in "will call" with [facility] ([id number]).  Awaiting HH orders.  Pt's spouse was updated.    Cecy Romano LMSW  [id number]  "

## 2017-06-22 NOTE — PLAN OF CARE
Problem: Occupational Therapy Goal  Goal: Occupational Therapy Goal  Goals to be met by: 6/30/17     Patient will increase functional independence with ADLs by performing:    Feeding with Minimal Assistance.  Grooming while EOB with Moderate Assistance.  Sitting at edge of bed x15 minutes with Contact Guard Assistance.  Rolling to Left with Minimal Assistance and use of bedrail as needed.   Rolling to Right with Maximum Assistance.   Supine to sit with Moderate Assistance.     Outcome: Ongoing (interventions implemented as appropriate)  Goals remain appropriate. Cont POC.    CARLYLE Galeano  6/22/2017  Pager: [phone]

## 2017-06-22 NOTE — PLAN OF CARE
[facility]     HOME HEALTH ORDERS  FACE TO FACE ENCOUNTER     Patient Name: Lucy Perez  YOB: 1960     PCP: Beveryl Muniz MD   PCP Address: [address] / [place] [address]  PCP Phone Number: [phone]  PCP Fax: [phone]     Encounter Date: 06/22/2017     Admit to Home Health     Diagnoses:         Active Hospital Problems     Diagnosis   POA    *Partial symptomatic epilepsy with complex partial seizures, not intractable, without status epilepticus [G40.209]   Yes    [place] coma scale total score 9-12 [R40.2420]   No    Anemia of chronic disease [D63.8]   No    Seizure disorder [G40.909]   Yes    Acute cystitis [N30.00]   Yes    Intertrigo [L30.4]   Yes    Constipation [K59.00]   Yes    Aphasia [R47.01]   Yes    Altered mental status [R41.82]   Yes    Left ventricular diastolic dysfunction with preserved systolic function [I51.9]   Yes    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       Resolved Hospital Problems     Diagnosis Date Resolved POA   No resolved problems to display.         Future Appointments  Date Time Provider Department Center   6/29/2017 12:00 PM PHYSICIAN, [facility] Rayo Jonesgina PCW   7/14/2017 10:40 AM Pola Galloway MD [facility] [place]   7/31/2017 1:15 PM Fior Ricks DPM [facility]          Follow-up Information      Beverly Muniz MD In 1 week.    Specialty:  Internal Medicine  Contact information:  [address]  [place] [address]  [phone]                         I have seen and examined this patient face to face today. My clinical findings that support the need for  the home health skilled services and home bound status are the following:  Weakness/numbness causing balance and gait disturbance due to Stroke making it taxing to leave home.     Allergies:       Review of patient's allergies indicates:   Allergen Reactions    Vicodin [hydrocodone-acetaminophen] Rash         Diet: dental soft diet with nectar thickened liquids   Patient may be given meds through PEG.     Activities: activity as tolerated     Nursing:   SN to complete comprehensive assessment including routine vital signs. Instruct on disease process and s/s of complications to report to MD. Review/verify medication list sent home with the patient at time of discharge  and instruct patient/caregiver as needed. Frequency may be adjusted depending on start of care date.     Notify MD if SBP > 160 or < 90; DBP > 90 or < 50; HR > 120 or < 50; Temp > 101     CONSULTS:    Physical Therapy to evaluate and treat. Evaluate for home safety and equipment needs; Establish/upgrade home exercise program. Perform / instruct on therapeutic exercises, gait training, transfer training, and Range of Motion.  Occupational Therapy to evaluate and treat. Evaluate home environment for safety and equipment needs. Perform/Instruct on transfers, ADL training, ROM, and therapeutic exercises.  Speech Therapy  to evaluate and treat for  Language and Swallowing.   to evaluate for community resources/long-range planning.     MISCELLANEOUS CARE:  PEG Care:  Instruct patient/caregiver to clean site.  Monitor skin integrity.  Routine Skin for Bedridden Patients: Instruct patient/caregiver to apply moisture barrier cream to all skin folds and wet areas in perineal area daily and after baths and all bowel movements.  Home Oxygen:  Oxygen at 1 L/min nasal canula to be used:  Continuously and As needed for SOB.        Medications: Review discharge medications with patient and family and provide education.             Current Discharge  "Medication List             CONTINUE these medications which have CHANGED     Details   levetiracetam (KEPPRA) 500 MG Tab Take 4 tablets (2,000 mg total) by mouth 2 (two) times daily.  Qty: 240 tablet, Refills: 2     Associated Diagnoses: Hemorrhagic cerebrovascular accident (CVA)                 CONTINUE these medications which have NOT CHANGED     Details   albuterol (PROVENTIL) 2.5 mg /3 mL (0.083 %) nebulizer solution Take 3 mLs (2.5 mg total) by nebulization every 6 (six) hours as needed for Wheezing.  Qty: 50 each, Refills: 0     Associated Diagnoses: Bronchitis       alprazolam (XANAX) 0.5 MG tablet Take 1 tablet (0.5 mg total) by mouth as directed. 1/2 to 1 tab Q8 hours as needed for anxiety  Qty: 30 tablet, Refills: 0     Associated Diagnoses: Anxiety       amlodipine (NORVASC) 10 MG tablet Take 1 tablet (10 mg total) by mouth once daily.  Qty: 90 tablet, Refills: 2     Associated Diagnoses: Essential hypertension       aspirin (ECOTRIN) 81 MG EC tablet Take 81 mg by mouth once daily.         atorvastatin (LIPITOR) 40 MG tablet Take 1 tablet (40 mg total) by mouth once daily. For Cholesterol  Qty: 90 tablet, Refills: 2     Associated Diagnoses: Pure hypercholesterolemia       baclofen (LIORESAL) 20 MG tablet Take 1 tablet (20 mg total) by mouth 3 (three) times daily.  Qty: 90 tablet, Refills: 11       BD INSULIN PEN NEEDLE UF SHORT 31 gauge x 5/16" Ndle USE TO INJECT NOVOLOG FLEXPEN BEFORE MEALS  Qty: 150 each, Refills: 11       blood sugar diagnostic (ACCU-CHEK SMARTVIEW TEST STRIP) Strp 1 strip by Misc.(Non-Drug; Combo Route) route 2 (two) times daily.  Qty: 300 each, Refills: 3     Associated Diagnoses: Type 2 diabetes mellitus with chronic kidney disease       clotrimazole-betamethasone 1-0.05% (LOTRISONE) cream Apply topically 2 (two) times daily. Apply to area of rash/iting on buttocks 1-2x/day as needed  Qty: 45 g, Refills: 0     Associated Diagnoses: Rash       colchicine 0.6 mg tablet 1 tab daily " as needed for gout flare  Qty: 30 tablet, Refills: 1     Associated Diagnoses: Gout, arthritis       famotidine (PEPCID) 20 MG tablet TAKE 1 TABLET (20 MG TOTAL) BY MOUTH ONCE DAILY.  Qty: 90 tablet, Refills: 3     Associated Diagnoses: Hemorrhagic cerebrovascular accident (CVA)       furosemide (LASIX) 40 MG tablet Take 1 tablet (40 mg total) by mouth once daily.  Qty: 90 tablet, Refills: 2     Associated Diagnoses: Essential hypertension       gabapentin (NEURONTIN) 100 MG capsule 1 in AM; 3 caps at Bedtime  Qty: 360 capsule, Refills: 3     Associated Diagnoses: Degeneration of lumbar or lumbosacral intervertebral disc       hydrALAZINE (APRESOLINE) 100 MG tablet Take 1 tablet (100 mg total) by mouth 3 (three) times daily.  Qty: 270 tablet, Refills: 3     Associated Diagnoses: Essential hypertension       insulin detemir (LEVEMIR FLEXTOUCH) 100 unit/mL (3 mL) SubQ InPn pen 16 units in AM and 10 units in the PM for Diabetes  Qty: 2 Box, Refills: 0     Associated Diagnoses: Type II or unspecified type diabetes mellitus without mention of complication, not stated as uncontrolled       labetalol (NORMODYNE) 100 MG tablet 5 tablets (500 mg total) by Per G Tube route every 8 (eight) hours.  Qty: 1350 tablet, Refills: 3     Associated Diagnoses: Essential hypertension       lisinopril 10 MG tablet Take 1 tablet (10 mg total) by mouth once daily.  Qty: 30 tablet, Refills: 3     Associated Diagnoses: Essential hypertension       multivitamin with iron Tab 1/day  Qty: 30 each, Refills: prn       nut.tx.gluc.intol,lac-free,soy (GLUCERNA 1.5 NOAH) Liqd 1.5 cans in AM, 1 can at Noon , 1.5cans at Dinner, and 1 can before Bed/Total 5 cans daily  Qty: 150 Can, Refills: 0     Associated Diagnoses: Dysphagia, unspecified type; Nontraumatic subcortical hemorrhage of right cerebral hemisphere                STOP taking these medications         carbamazepine (TEGRETOL) 200 mg tablet Comments:   Reason for Stopping:             ciprofloxacin HCl (CILOXAN) 0.3 % ophthalmic solution Comments:   Reason for Stopping:            dantrolene (DANTRIUM) 50 MG Cap Comments:   Reason for Stopping:            fluoxetine (PROZAC) 20 MG tablet Comments:   Reason for Stopping:            pantoprazole (PROTONIX) 40 MG tablet Comments:   Reason for Stopping:            sodium polystyrene (KAYEXALATE) 15 gram/60 mL Susp Comments:   Reason for Stopping:            urea 20 % Crea Comments:   Reason for Stopping:                 I certify that this patient is confined to her home and needs intermittent skilled nursing care, physical therapy, speech therapy and occupational therapy.

## 2017-06-22 NOTE — PT/OT/SLP PROGRESS
"Speech Language Pathology  Treatment    Lucy Perez   MRN: [id number]   903/903 A    Admitting Diagnosis: Partial symptomatic epilepsy with complex partial seizures, not intractable, without status epilepticus    Diet recommendations: Solid Diet Level: Dental Soft  Liquid Diet Level: Nectar Thick   To achieve nectar thick liquid: 6 oz of any liquid to 1 pack of thickener  · No ice cream, jello, or ice.  · Avoid mixed consistencies (soup, cereal with milk, juicy fruits).  Choose puree/very soft items off tray  Feed only when awake/alert*, No straws, HOB to 90 degrees, Small bites/sips and Assistance with thickening liquids  Assistance with meals- per , pt independently takes very large bites     Consider PO as for pleasure only at this time as lethargy may prevent pt from getting adequate PO nutrition. Consider use of PEG to supplement.     SLP Treatment Date: 06/22/17  Speech Start Time: 0912     Speech Stop Time: 0931     Speech Total (min): 19 min       TREATMENT BILLABLE MINUTES:  Speech Therapy Individual 9 and Treatment Swallowing Dysfunction 10    Has the patient been evaluated by SLP for swallowing? : Yes  Keep patient NPO?: No   General Precautions: Standard, aspiration, fall          Subjective:  Pt awake and cooperative with  present in room. Dr. Weir entered room at the end of the session. Pt reported, "i was tired yesterday."     Objective:   CXR reviewed from 6/21 2/2  reporting noncompliance with thickener and concern for aspiration. Per chart review and MD report, findings were not concerning for aspiration.   SLP assessed pt with a meal this date (multiple 1/2 tsp bites of eggs, grits, a croissant, and NECTAR thick water). Water was thickened to the correct consistency prior to SLP entry. Pt's  verbalized understanding of the importance of continued NECTAR thick liquids and following all safe swallowing precautions. Pt's  reported that his daughter " occasionally allows pt to feed herself and she typically takes very large bites.   Given cues, pt verbalized all safe swallowing precautions, however, would still benefit from being monitor with meals to cue to take small bites, 1 bite at a time, and eat slowly.   Pt completed oral motor exercises with mod-max cues this date.  Pt answered problem solving questions given repetitions of questions and cues with 85% accuracy. Pt did not follow 2 step commands this date. Pt oriented x4.    Assessment:  Lucy Preez is a 56 y.o. female with a medical diagnosis of Partial symptomatic epilepsy with complex partial seizures, not intractable, without status epilepticus and presents with Dysphagia and cognitive-linguistic impairments. ST will continue to follow.     Discharge recommendations: Discharge Facility/Level Of Care Needs: home health speech therapy     Goals:    SLP Goals        Problem: SLP Goal    Goal Priority Disciplines Outcome   SLP Goal     SLP Ongoing (interventions implemented as appropriate)   Description:  Speech Language Pathology Goals  Goals expected to be met by 6/20    1. Pt will tolerate dental soft diet/nectar thick liquids without overt s/s of aspiration  2. Pt will tolerate thin liquid trials x10 without overt s/s of aspiration  3. Pt will complete OME x10 reps with min cues  4. Pt will complete recall tasks with 70% accuracy and min cues  5. Pt will follow 3 step commands with 75% accuracy and min cues  6. Pt will complete problem solving tasks with 80% accuracy and min cues                         Plan:   Patient to be seen Therapy Frequency: 5 x/week   Plan of Care expires: 07/12/17  Plan of Care reviewed with: patient, spouse  SLP Follow-up?: Yes              MAURICE Alicea, CCC-SLP  06/22/2017

## 2017-06-23 ENCOUNTER — OUTPATIENT CASE MANAGEMENT (OUTPATIENT)
Dept: ADMINISTRATIVE | Facility: OTHER | Age: 57
End: 2017-06-23

## 2017-06-23 ENCOUNTER — TELEPHONE (OUTPATIENT)
Dept: INTERNAL MEDICINE | Facility: CLINIC | Age: 57
End: 2017-06-23

## 2017-06-23 DIAGNOSIS — F41.9 ANXIETY: ICD-10-CM

## 2017-06-23 DIAGNOSIS — R25.2 SPASMS OF THE HANDS OR FEET: Primary | ICD-10-CM

## 2017-06-23 DIAGNOSIS — M51.37 DEGENERATION OF LUMBAR OR LUMBOSACRAL INTERVERTEBRAL DISC: ICD-10-CM

## 2017-06-23 DIAGNOSIS — R21 RASH: ICD-10-CM

## 2017-06-23 RX ORDER — ALPRAZOLAM 0.5 MG/1
TABLET ORAL
Qty: 30 TABLET | Refills: 0 | Status: SHIPPED | OUTPATIENT
Start: 2017-06-23 | End: 2018-01-03 | Stop reason: SDUPTHER

## 2017-06-23 RX ORDER — GABAPENTIN 100 MG/1
CAPSULE ORAL
Qty: 360 CAPSULE | Refills: 3 | Status: SHIPPED | OUTPATIENT
Start: 2017-06-23 | End: 2017-07-12 | Stop reason: SDUPTHER

## 2017-06-23 RX ORDER — CLOTRIMAZOLE AND BETAMETHASONE DIPROPIONATE 10; .64 MG/G; MG/G
CREAM TOPICAL 2 TIMES DAILY
Qty: 45 G | Refills: 3 | Status: SHIPPED | OUTPATIENT
Start: 2017-06-23 | End: 2017-08-10 | Stop reason: SDUPTHER

## 2017-06-23 RX ORDER — BACLOFEN 20 MG/1
20 TABLET ORAL 3 TIMES DAILY
Qty: 270 TABLET | Refills: 1 | Status: SHIPPED | OUTPATIENT
Start: 2017-06-23 | End: 2017-07-12

## 2017-06-23 NOTE — PT/OT/SLP DISCHARGE
Physical Therapy Discharge Summary    Lucy Perez  MRN: [id number]   Partial symptomatic epilepsy with complex partial seizures, not intractable, without status epilepticus   Patient Discharged from acute Physical Therapy on 17.  Please refer to prior PT noted date on 6/15/2017 for functional status.     Assessment:   Patient appropriate for care in another setting.  GOALS:    Physical Therapy Goals        Problem: Physical Therapy Goal    Goal Priority Disciplines Outcome Goal Variances Interventions   Physical Therapy Goal     PT/OT, PT Ongoing (interventions implemented as appropriate)     Description:  Goals to be met by: 7 days ()    Patient will increase functional independence with mobility by performin. Rolling to Left with Moderate Assistance.  2. Lower extremity exercise program x10 reps per handout, with assistance as needed  3. Family will report performing PROM x 20 reps to (B) LE and (B) UE.                      Reasons for Discontinuation of Therapy Services  Transfer to alternate level of care.      Plan:  Patient Discharged to: Home with Home Health Service and 24-hr care.    Feliz Parrish III, DPT  2017

## 2017-06-23 NOTE — TELEPHONE ENCOUNTER
Alesia, please call Ashli with [facility] and let her know that it's ok to monitor pt's O2, and I have sent refills into [facility] except Xanax, which we will fax into [facility] [address]. Also, I recommend Duoderms to sacral skin tears and abdomen wall, all to be changed Q72 hours. If no improvement in skin tears/decubs, I would recommend consult the Wound care nurse.  Thanks

## 2017-06-23 NOTE — PROGRESS NOTES
Thank you for the referral.   For your information:    The following patient has been assigned to Mona Wheatley RN with Outpatient Complex Care Management for high risk screening.    Reason:  Altered mental status, unspecified altered mental status type    Please contact [facility] at [phone] with any questions.    Thank you,  Anita Zamudio

## 2017-06-23 NOTE — TELEPHONE ENCOUNTER
C3 nurse attempted to contact patient. The following occurred:   C3 nurse attempted to contact Lucy Perez  for a TCC post hospital discharge follow up call. The patient is unable to conduct the call @ this time. The patient's daughter requested a callback.     The patient has a scheduled HOSFU appointment with [facility] on 6\29\17@ 1200. Message sent to Physician staff.   Respectfully,   Paige Riley RN   [facility]     (Routing comment)

## 2017-06-23 NOTE — TELEPHONE ENCOUNTER
----- Message from Nannette Carreno sent at 6/23/2017 11:48 AM CDT -----  Contact: Ashli/[facility] [phone]  Patient came out of the hospital yesterday and resumed home health today and her pressure is 184/82. Needs RX called in for alprazolam (XANAX) 0.5 MG tablet,  baclofen (LIORESAL) 20 MG tablet,  gabapentin (NEURONTIN) 100 MG capsule and Antifungal barrier cream. Also needs order to monitor her oxygen. Also patient has 2 skin tares one on her left buttox and one on her left stomach fold. Please advise    Thanks

## 2017-06-23 NOTE — TELEPHONE ENCOUNTER
Cecy/Anaid,, please fax pt's Xanax to [facility] on  [address] and schedule her a RTC Appt with me 8/10 at 10AM. Thanks

## 2017-06-26 ENCOUNTER — OUTPATIENT CASE MANAGEMENT (OUTPATIENT)
Dept: ADMINISTRATIVE | Facility: OTHER | Age: 57
End: 2017-06-26

## 2017-06-26 ENCOUNTER — TELEPHONE (OUTPATIENT)
Dept: INTERNAL MEDICINE | Facility: CLINIC | Age: 57
End: 2017-06-26

## 2017-06-26 VITALS — DIASTOLIC BLOOD PRESSURE: 62 MMHG | OXYGEN SATURATION: 93 % | SYSTOLIC BLOOD PRESSURE: 150 MMHG | HEART RATE: 74 BPM

## 2017-06-26 NOTE — PROGRESS NOTES
6/26/2017  1143  Referral received for enrollment in OPCM, high risk program.  Review of EMR, patient recently d/c from [facility] to home with HH.  Call to patient's mobile number [phone], reached patient's daughter, Reina.  Reina reports that they are currently at [facility] ED for evaluation of patient's unresponsiveness this am.  Reina is agreeable to call later this week to see how patient is doing.  KARINA Pickett

## 2017-06-26 NOTE — PROGRESS NOTES
AAOx2.  Denies discomfort. Bedridden.  Lives w/ spouse who was present for and engaged in visit. PEG in situ.  Encouraged PROM exercises to extremities.  Spouse assesses and logs vitals daily, including blood sugars. Education provided on goal of stroke mobile, s/s of stroke, diet, exercise, medications.  Son verbalizes understanding of all instructions provided.  Encouraged to utilize stroke team for any concern.

## 2017-06-26 NOTE — TELEPHONE ENCOUNTER
Called justine back and left a detailed message on what orders Dr Muniz gave for the pt. No answer left message for her to give my direct line a call back

## 2017-06-26 NOTE — TELEPHONE ENCOUNTER
Tione, please CANCEL pt's appt on 8/11 and put her on for 8/10 at 10AM.  Please ask Emily to open this 1 slot.  Cecy didn't know how to do this, sorry. Thanks

## 2017-06-26 NOTE — PT/OT/SLP DISCHARGE
Occupational Therapy Discharge Summary    Lucy Perez  MRN: [id number]   Partial symptomatic epilepsy with complex partial seizures, not intractable, without status epilepticus   Patient Discharged from acute Occupational Therapy on 6/22/17.  Please refer to prior OT note dated on 6/22/17 for functional status.     Assessment:   Patient appropriate for care in another setting.  GOALS:    Occupational Therapy Goals        Problem: Occupational Therapy Goal    Goal Priority Disciplines Outcome Interventions   Occupational Therapy Goal     OT, PT/OT Ongoing (interventions implemented as appropriate)    Description:  Goals to be met by: 6/30/17     Patient will increase functional independence with ADLs by performing:    Feeding with Minimal Assistance.  Grooming while EOB with Moderate Assistance.  Sitting at edge of bed x15 minutes with Contact Guard Assistance.  Rolling to Left with Minimal Assistance and use of bedrail as needed.   Rolling to Right with Maximum Assistance.   Supine to sit with Moderate Assistance.                    Reasons for Discontinuation of Therapy Services  Transfer to alternate level of care.      Plan:  Patient Discharged to: Home with Home Health Service.    CARLYLE Galeano  6/26/2017  Pager: [phone]

## 2017-06-28 PROBLEM — Z93.1 PEG (PERCUTANEOUS ENDOSCOPIC GASTROSTOMY) STATUS: Chronic | Status: ACTIVE | Noted: 2017-06-28

## 2017-06-28 PROBLEM — N30.00 ACUTE CYSTITIS: Status: RESOLVED | Noted: 2017-06-20 | Resolved: 2017-06-28

## 2017-06-28 PROBLEM — R40.2420 GLASGOW COMA SCALE TOTAL SCORE 9-12: Status: RESOLVED | Noted: 2017-06-20 | Resolved: 2017-06-28

## 2017-06-28 PROBLEM — Z86.73 HISTORY OF STROKE: Status: ACTIVE | Noted: 2017-06-28

## 2017-07-03 ENCOUNTER — TELEPHONE (OUTPATIENT)
Dept: INTERNAL MEDICINE | Facility: CLINIC | Age: 57
End: 2017-07-03

## 2017-07-03 NOTE — TELEPHONE ENCOUNTER
Spoke with [facility], they are asking for clarification of order for the Lotrisone cream.  (is it apply twice a day or 1-2 times a day as needed)?

## 2017-07-03 NOTE — TELEPHONE ENCOUNTER
----- Message from Nannette Carreno sent at 7/3/2017 11:35 AM CDT -----  Contact: Anita Portillo [phone] REF#[id number]  Needs clarity on dosage of RX clotrimazole-betamethasone 1-0.05% (LOTRISONE) cream.    Please call and advise.    Thank You

## 2017-07-05 ENCOUNTER — OUTPATIENT CASE MANAGEMENT (OUTPATIENT)
Dept: ADMINISTRATIVE | Facility: OTHER | Age: 57
End: 2017-07-05

## 2017-07-05 NOTE — PROGRESS NOTES
7/5/2017  1435  Call to patient's daughter, Reina, [phone] she reports that patient is currently in the hospital, at [facility].  Will close referral at this time, given daughter my contact information and encouraged her to call for enrollment post discharge from [facility].  KARINA Pickett

## 2017-07-11 ENCOUNTER — HOME CARE VISIT (OUTPATIENT)
Dept: NEUROLOGY | Facility: HOSPITAL | Age: 57
End: 2017-07-11

## 2017-07-12 ENCOUNTER — TELEPHONE (OUTPATIENT)
Dept: INTERNAL MEDICINE | Facility: CLINIC | Age: 57
End: 2017-07-12

## 2017-07-12 ENCOUNTER — DOCUMENTATION ONLY (OUTPATIENT)
Dept: INTERNAL MEDICINE | Facility: CLINIC | Age: 57
End: 2017-07-12

## 2017-07-12 DIAGNOSIS — N18.30 CONTROLLED TYPE 1 DIABETES MELLITUS WITH STAGE 3 CHRONIC KIDNEY DISEASE: ICD-10-CM

## 2017-07-12 DIAGNOSIS — M51.37 DEGENERATION OF LUMBAR OR LUMBOSACRAL INTERVERTEBRAL DISC: ICD-10-CM

## 2017-07-12 DIAGNOSIS — E10.22 CONTROLLED TYPE 1 DIABETES MELLITUS WITH STAGE 3 CHRONIC KIDNEY DISEASE: ICD-10-CM

## 2017-07-12 DIAGNOSIS — I61.9 HEMORRHAGIC CEREBROVASCULAR ACCIDENT (CVA): ICD-10-CM

## 2017-07-12 RX ORDER — GABAPENTIN 300 MG/1
CAPSULE ORAL
Qty: 30 CAPSULE | Refills: 0
Start: 2017-07-12 | End: 2017-09-18

## 2017-07-12 RX ORDER — HYDROCHLOROTHIAZIDE 25 MG/1
25 TABLET ORAL DAILY
COMMUNITY
End: 2017-10-27 | Stop reason: SDUPTHER

## 2017-07-12 RX ORDER — SPIRONOLACTONE 50 MG/1
50 TABLET, FILM COATED ORAL DAILY
Qty: 30 TABLET | Refills: 0
Start: 2017-07-12 | End: 2017-08-10

## 2017-07-12 RX ORDER — FLUOXETINE HYDROCHLORIDE 20 MG/1
20 CAPSULE ORAL DAILY
Status: ON HOLD | COMMUNITY
End: 2017-08-03

## 2017-07-12 RX ORDER — LEVETIRACETAM 750 MG/1
750 TABLET ORAL 2 TIMES DAILY
Status: ON HOLD | COMMUNITY
End: 2017-08-04 | Stop reason: HOSPADM

## 2017-07-12 RX ORDER — PHENYTOIN SODIUM 300 MG/1
300 CAPSULE, EXTENDED RELEASE ORAL NIGHTLY
COMMUNITY
End: 2017-11-06

## 2017-07-12 RX ORDER — SPIRONOLACTONE 25 MG/1
25 TABLET ORAL DAILY
COMMUNITY
End: 2017-07-12

## 2017-07-12 NOTE — TELEPHONE ENCOUNTER
Jeferson, just wanted to let you know Mrs Ayala was admitted to [facility] 6/6/through 6/22 for MS change/?Seizure D/O and then readmitted but to [facility] 6/26 with further MS change and presumed seizure like episodes. She was taken off baclofen which was felt to possibly have been the culprit but had negative EEG there.  For Seizure D/O was D/C'd on: Keppra,carbamazepine and dilantin-I've updated med list on dosages. She is supposed to see you 7/14 but the family is working on transporation . Tough case/thanks for your continuity of care. Beverly

## 2017-07-12 NOTE — TELEPHONE ENCOUNTER
Spoke with Francheska with [facility] and have given verbal orders for Mepilex or Duoderm dressing changes for St II decubs. 'Have also ok'd  consulation for assistance at home through any available community resources. Pt was readmitted to [facility] 6/26 after d/c from [facility] 6/22. She was readmitted for recurrent seizures. She is taking all meds/nutrition by PEG.  I have recommended: Glucerna at 1 can QID to start with ultimate goal up to 6 cans daily, but will have to make sure pt tolerating 4 without residual /aspiration before increasing. Francheska will also do fasting lab(CMP,CBC,Lipid,HgbA1C) 1 week prior to pt's RTC Appt with me in August.

## 2017-07-12 NOTE — TELEPHONE ENCOUNTER
----- Message from Salma Ivory sent at 7/12/2017  3:14 PM CDT -----  Contact: Cecy/ [facility] [phone]  Pt was evaluated for pt and is unable to participate at this time . Pt's family declined ot at this time.

## 2017-07-12 NOTE — TELEPHONE ENCOUNTER
Jeferson, I wanted to make sure you got my last EPIC message.  I'm not sure I routed it correctly.Thanks, Beverly

## 2017-07-12 NOTE — TELEPHONE ENCOUNTER
----- Message from Rosa Nazario sent at 7/12/2017  2:16 PM CDT -----  Contact: Francheska with [facility]   Type: Home Health (orders, updates, clarifications, etc.)    Home Health Agency/Nurse: Francheska with [facility]     Phone number: [phone]    Reason for call: Need orders for wound care for stage II pressure sores to sacrum.  Also, she needs a  evaluation order.  Also, tube feeding orders.     Thanks!

## 2017-07-13 ENCOUNTER — TELEPHONE (OUTPATIENT)
Dept: PHYSICAL MEDICINE AND REHAB | Facility: HOSPITAL | Age: 57
End: 2017-07-13

## 2017-07-17 ENCOUNTER — TELEPHONE (OUTPATIENT)
Dept: INTERNAL MEDICINE | Facility: CLINIC | Age: 57
End: 2017-07-17

## 2017-07-17 RX ORDER — FLUOXETINE 20 MG/1
TABLET ORAL
Qty: 90 TABLET | Refills: 2 | Status: SHIPPED | OUTPATIENT
Start: 2017-07-17 | End: 2017-09-21 | Stop reason: SDUPTHER

## 2017-07-17 NOTE — TELEPHONE ENCOUNTER
----- Message from Akbar Live sent at 7/17/2017  9:27 AM CDT -----  Contact: [facility]   Type: Home Health (orders, updates, clarifications, etc.)    Home Health Agency/Nurse: [facility]     Phone number: [phone]    Reason for call: patient has a BP of 117/57 , wanted to know should she take her BP medication .     Comments: please advise , Thanks  !

## 2017-07-18 NOTE — TELEPHONE ENCOUNTER
Meds were held this AM - /68 this PM - she will get her labetalol and hydralazine this evening -  will continue to monitor - she was doing fine today no behavioral changes to indicated cause of low BP.

## 2017-07-27 ENCOUNTER — TELEPHONE (OUTPATIENT)
Dept: INTERNAL MEDICINE | Facility: CLINIC | Age: 57
End: 2017-07-27

## 2017-07-27 DIAGNOSIS — R09.89 CHEST CONGESTION: Primary | ICD-10-CM

## 2017-07-27 DIAGNOSIS — G40.909 SEIZURE DISORDER: ICD-10-CM

## 2017-07-27 RX ORDER — ALBUTEROL SULFATE 0.83 MG/ML
2.5 SOLUTION RESPIRATORY (INHALATION) EVERY 6 HOURS PRN
Qty: 25 EACH | Refills: 3 | Status: SHIPPED | OUTPATIENT
Start: 2017-07-27 | End: 2017-08-16 | Stop reason: SDUPTHER

## 2017-07-27 NOTE — TELEPHONE ENCOUNTER
See note below. Spoke with Carlie and informed pt has been dismissed from [facility] for bad credit, informed Carlie of the financial debt phone number.      Please advise thanks.

## 2017-07-27 NOTE — TELEPHONE ENCOUNTER
Spoke with Carlie who notes that Mrs Perez has been congested in the chest but no F/C,no SOB,no purulence. I have ok'd/erx'd in: Albuterol 2.5mg/3ml NS treatments at home Q6 hours as needed.  I have added order for Dilantin as well. Pt's family to call Billing Dept to work out financial issues.  Tione, please link Dilantin level to pt's lab for Monday.  Thanks

## 2017-07-27 NOTE — TELEPHONE ENCOUNTER
----- Message from Celestine Montalvo sent at 7/27/2017 12:52 PM CDT -----  Contact: Carlie/ [place]/ [phone] cell  Pt has rhonchi or rales and no fever and her oxygen sats is 98%.  Pt's family want to give breathing treatment but it's no longer on her plan of care.  She is not in distress but is experiencing abnormal breath sounds.  She has labs due on Monday, can we add a Dilantin level to the labs?  Please call and advise.    Thank you

## 2017-07-31 ENCOUNTER — TELEPHONE (OUTPATIENT)
Dept: INTERNAL MEDICINE | Facility: CLINIC | Age: 57
End: 2017-07-31

## 2017-07-31 DIAGNOSIS — I69.120 APHASIA FOLLOWING NONTRAUMATIC INTRACEREBRAL HEMORRHAGE: ICD-10-CM

## 2017-07-31 NOTE — TELEPHONE ENCOUNTER
----- Message from Cecy Loya sent at 7/31/2017 12:33 PM CDT -----  Contact: self/[phone]  Patient called in regard need for doctor approve [facility] request for modify varium swallow study. Please call and advise.      Thank you!!!

## 2017-07-31 NOTE — TELEPHONE ENCOUNTER
Alesia, I have placed order for a Modified Barium Swallow with Video study.  Please call Carlie with [facility]([phone]) to let her know and let Speech Therapy know.  Thanks

## 2017-08-01 ENCOUNTER — HOME CARE VISIT (OUTPATIENT)
Dept: NEUROLOGY | Facility: HOSPITAL | Age: 57
End: 2017-08-01

## 2017-08-01 NOTE — TELEPHONE ENCOUNTER
----- Message from Rea Contreras LPN sent at 8/1/2017  7:55 AM CDT -----  Good morning Dr. Muniz,    I did received the orders requesting a Modified Barium Swallow Study for Mrs. Perez. As much as I hate it, I won't be able to schedule it. [facility] is blocking me scheduling stating that she owes too much money. If there is anything, at all, that I can help with, please let me know.    Thanks so much!    Rea Contreras LPN  Speech Pathology Coordinator  Phone: [phone]

## 2017-08-02 ENCOUNTER — HOSPITAL ENCOUNTER (INPATIENT)
Facility: HOSPITAL | Age: 57
LOS: 2 days | Discharge: HOME HEALTH | DRG: 683 | End: 2017-08-04
Attending: EMERGENCY MEDICINE | Admitting: INTERNAL MEDICINE
Payer: MEDICARE

## 2017-08-02 ENCOUNTER — TELEPHONE (OUTPATIENT)
Dept: INTERNAL MEDICINE | Facility: CLINIC | Age: 57
End: 2017-08-02

## 2017-08-02 DIAGNOSIS — E11.22 TYPE 2 DIABETES MELLITUS WITH DIABETIC CHRONIC KIDNEY DISEASE: ICD-10-CM

## 2017-08-02 DIAGNOSIS — N17.9 AKI (ACUTE KIDNEY INJURY): ICD-10-CM

## 2017-08-02 DIAGNOSIS — G40.209 PARTIAL SYMPTOMATIC EPILEPSY WITH COMPLEX PARTIAL SEIZURES, NOT INTRACTABLE, WITHOUT STATUS EPILEPTICUS: ICD-10-CM

## 2017-08-02 DIAGNOSIS — E11.3393 TYPE 2 DIABETES MELLITUS WITH BOTH EYES AFFECTED BY MODERATE NONPROLIFERATIVE RETINOPATHY WITHOUT MACULAR EDEMA, WITHOUT LONG-TERM CURRENT USE OF INSULIN: Chronic | ICD-10-CM

## 2017-08-02 DIAGNOSIS — G81.14 LEFT SPASTIC HEMIPARESIS: ICD-10-CM

## 2017-08-02 DIAGNOSIS — E11.22 TYPE 2 DIABETES MELLITUS WITH STAGE 3 CHRONIC KIDNEY DISEASE, WITHOUT LONG-TERM CURRENT USE OF INSULIN: Chronic | ICD-10-CM

## 2017-08-02 DIAGNOSIS — N17.9 ACUTE KIDNEY FAILURE, UNSPECIFIED: ICD-10-CM

## 2017-08-02 DIAGNOSIS — E11.42 TYPE 2 DIABETES MELLITUS WITH DIABETIC POLYNEUROPATHY: ICD-10-CM

## 2017-08-02 DIAGNOSIS — I15.0 RENOVASCULAR HYPERTENSION: ICD-10-CM

## 2017-08-02 DIAGNOSIS — F41.8 MIXED ANXIETY AND DEPRESSIVE DISORDER: ICD-10-CM

## 2017-08-02 DIAGNOSIS — N17.9 ACUTE KIDNEY INJURY (NONTRAUMATIC): Primary | ICD-10-CM

## 2017-08-02 DIAGNOSIS — I15.0 RENOVASCULAR HYPERTENSION: Chronic | ICD-10-CM

## 2017-08-02 DIAGNOSIS — I51.89 LEFT VENTRICULAR DIASTOLIC DYSFUNCTION WITH PRESERVED SYSTOLIC FUNCTION: ICD-10-CM

## 2017-08-02 DIAGNOSIS — G40.209 LOCALIZATION-RELATED (FOCAL) (PARTIAL) SYMPTOMATIC EPILEPSY AND EPILEPTIC SYNDROMES WITH COMPLEX PARTIAL SEIZURES, NOT INTRACTABLE, WITHOUT STATUS EPILEPTICUS: ICD-10-CM

## 2017-08-02 DIAGNOSIS — Z86.73 HISTORY OF STROKE: ICD-10-CM

## 2017-08-02 DIAGNOSIS — N18.30 TYPE 2 DIABETES MELLITUS WITH STAGE 3 CHRONIC KIDNEY DISEASE, WITHOUT LONG-TERM CURRENT USE OF INSULIN: Chronic | ICD-10-CM

## 2017-08-02 DIAGNOSIS — E11.42 TYPE 2 DIABETES MELLITUS WITH DIABETIC POLYNEUROPATHY, WITHOUT LONG-TERM CURRENT USE OF INSULIN: Chronic | ICD-10-CM

## 2017-08-02 PROBLEM — K59.00 CONSTIPATION: Status: RESOLVED | Noted: 2017-06-16 | Resolved: 2017-08-02

## 2017-08-02 PROBLEM — D63.1 ANEMIA IN STAGE 3 CHRONIC KIDNEY DISEASE: Status: ACTIVE | Noted: 2017-08-02

## 2017-08-02 PROBLEM — E11.39 TYPE 2 DIABETES MELLITUS WITH OPHTHALMIC COMPLICATION, WITHOUT LONG-TERM CURRENT USE OF INSULIN: Status: ACTIVE | Noted: 2017-08-02

## 2017-08-02 PROBLEM — D63.8 ANEMIA OF CHRONIC DISEASE: Status: RESOLVED | Noted: 2017-06-20 | Resolved: 2017-08-02

## 2017-08-02 PROBLEM — L30.4 INTERTRIGO: Status: RESOLVED | Noted: 2017-06-16 | Resolved: 2017-08-02

## 2017-08-02 PROBLEM — G40.909 SEIZURE DISORDER: Status: RESOLVED | Noted: 2017-06-20 | Resolved: 2017-08-02

## 2017-08-02 LAB
ALBUMIN SERPL BCP-MCNC: 3.5 G/DL
ALP SERPL-CCNC: 107 U/L
ALT SERPL W/O P-5'-P-CCNC: 11 U/L
ANION GAP SERPL CALC-SCNC: 14 MMOL/L
AST SERPL-CCNC: 14 U/L
BASOPHILS # BLD AUTO: 0.04 K/UL
BASOPHILS NFR BLD: 0.7 %
BILIRUB SERPL-MCNC: 0.3 MG/DL
BUN SERPL-MCNC: 81 MG/DL (ref 6–30)
BUN SERPL-MCNC: 85 MG/DL
CALCIUM SERPL-MCNC: 9.8 MG/DL
CHLORIDE SERPL-SCNC: 106 MMOL/L
CHLORIDE SERPL-SCNC: 109 MMOL/L (ref 95–110)
CO2 SERPL-SCNC: 18 MMOL/L
CREAT SERPL-MCNC: 2.7 MG/DL (ref 0.5–1.4)
CREAT SERPL-MCNC: 2.8 MG/DL
DIFFERENTIAL METHOD: ABNORMAL
EOSINOPHIL # BLD AUTO: 0.4 K/UL
EOSINOPHIL NFR BLD: 7 %
ERYTHROCYTE [DISTWIDTH] IN BLOOD BY AUTOMATED COUNT: 15.5 %
EST. GFR  (AFRICAN AMERICAN): 20.7 ML/MIN/1.73 M^2
EST. GFR  (NON AFRICAN AMERICAN): 17.9 ML/MIN/1.73 M^2
GLUCOSE SERPL-MCNC: 142 MG/DL
GLUCOSE SERPL-MCNC: 145 MG/DL (ref 70–110)
HCT VFR BLD AUTO: 26.8 %
HCT VFR BLD CALC: 26 %PCV (ref 36–54)
HGB BLD-MCNC: 8.5 G/DL
INR PPP: 0.9
LYMPHOCYTES # BLD AUTO: 0.9 K/UL
LYMPHOCYTES NFR BLD: 15.7 %
MAGNESIUM SERPL-MCNC: 2.3 MG/DL
MCH RBC QN AUTO: 29.9 PG
MCHC RBC AUTO-ENTMCNC: 31.7 G/DL
MCV RBC AUTO: 94 FL
MONOCYTES # BLD AUTO: 0.4 K/UL
MONOCYTES NFR BLD: 7 %
NEUTROPHILS # BLD AUTO: 3.9 K/UL
NEUTROPHILS NFR BLD: 69.4 %
PLATELET # BLD AUTO: 250 K/UL
PMV BLD AUTO: 9.8 FL
POC IONIZED CALCIUM: 1.16 MMOL/L (ref 1.06–1.42)
POC TCO2 (MEASURED): 22 MMOL/L (ref 23–29)
POCT GLUCOSE: 131 MG/DL (ref 70–110)
POCT GLUCOSE: 141 MG/DL (ref 70–110)
POTASSIUM BLD-SCNC: 4.7 MMOL/L (ref 3.5–5.1)
POTASSIUM SERPL-SCNC: 4.8 MMOL/L
PROT SERPL-MCNC: 7.3 G/DL
PROTHROMBIN TIME: 10.2 SEC
RBC # BLD AUTO: 2.84 M/UL
SAMPLE: ABNORMAL
SODIUM BLD-SCNC: 138 MMOL/L (ref 136–145)
SODIUM SERPL-SCNC: 138 MMOL/L
WBC # BLD AUTO: 5.68 K/UL

## 2017-08-02 PROCEDURE — 25000003 PHARM REV CODE 250: Performed by: EMERGENCY MEDICINE

## 2017-08-02 PROCEDURE — 80053 COMPREHEN METABOLIC PANEL: CPT

## 2017-08-02 PROCEDURE — 96360 HYDRATION IV INFUSION INIT: CPT

## 2017-08-02 PROCEDURE — 25000003 PHARM REV CODE 250: Performed by: STUDENT IN AN ORGANIZED HEALTH CARE EDUCATION/TRAINING PROGRAM

## 2017-08-02 PROCEDURE — 99223 1ST HOSP IP/OBS HIGH 75: CPT | Mod: AI,GC,, | Performed by: INTERNAL MEDICINE

## 2017-08-02 PROCEDURE — 83735 ASSAY OF MAGNESIUM: CPT

## 2017-08-02 PROCEDURE — 99285 EMERGENCY DEPT VISIT HI MDM: CPT | Mod: ,,, | Performed by: EMERGENCY MEDICINE

## 2017-08-02 PROCEDURE — 63600175 PHARM REV CODE 636 W HCPCS: Performed by: STUDENT IN AN ORGANIZED HEALTH CARE EDUCATION/TRAINING PROGRAM

## 2017-08-02 PROCEDURE — 96361 HYDRATE IV INFUSION ADD-ON: CPT

## 2017-08-02 PROCEDURE — 80186 ASSAY OF PHENYTOIN FREE: CPT

## 2017-08-02 PROCEDURE — 11000001 HC ACUTE MED/SURG PRIVATE ROOM

## 2017-08-02 PROCEDURE — 80177 DRUG SCRN QUAN LEVETIRACETAM: CPT

## 2017-08-02 PROCEDURE — 99285 EMERGENCY DEPT VISIT HI MDM: CPT | Mod: 25

## 2017-08-02 PROCEDURE — 85610 PROTHROMBIN TIME: CPT

## 2017-08-02 PROCEDURE — 85025 COMPLETE CBC W/AUTO DIFF WBC: CPT

## 2017-08-02 RX ORDER — ALBUTEROL SULFATE 0.83 MG/ML
2.5 SOLUTION RESPIRATORY (INHALATION) EVERY 6 HOURS PRN
Status: DISCONTINUED | OUTPATIENT
Start: 2017-08-02 | End: 2017-08-04 | Stop reason: HOSPADM

## 2017-08-02 RX ORDER — AMLODIPINE BESYLATE 10 MG/1
10 TABLET ORAL DAILY
Status: DISCONTINUED | OUTPATIENT
Start: 2017-08-03 | End: 2017-08-04 | Stop reason: HOSPADM

## 2017-08-02 RX ORDER — IBUPROFEN 200 MG
16 TABLET ORAL
Status: DISCONTINUED | OUTPATIENT
Start: 2017-08-02 | End: 2017-08-04 | Stop reason: HOSPADM

## 2017-08-02 RX ORDER — PHENYTOIN SODIUM 100 MG/1
300 CAPSULE, EXTENDED RELEASE ORAL NIGHTLY
Status: DISCONTINUED | OUTPATIENT
Start: 2017-08-02 | End: 2017-08-04 | Stop reason: HOSPADM

## 2017-08-02 RX ORDER — LEVETIRACETAM 750 MG/1
750 TABLET ORAL 2 TIMES DAILY
Status: DISCONTINUED | OUTPATIENT
Start: 2017-08-02 | End: 2017-08-04 | Stop reason: HOSPADM

## 2017-08-02 RX ORDER — IBUPROFEN 200 MG
24 TABLET ORAL
Status: DISCONTINUED | OUTPATIENT
Start: 2017-08-02 | End: 2017-08-04 | Stop reason: HOSPADM

## 2017-08-02 RX ORDER — ASPIRIN 81 MG/1
81 TABLET ORAL DAILY
Status: DISCONTINUED | OUTPATIENT
Start: 2017-08-03 | End: 2017-08-04 | Stop reason: HOSPADM

## 2017-08-02 RX ORDER — AMOXICILLIN 250 MG
1 CAPSULE ORAL 2 TIMES DAILY
Status: DISCONTINUED | OUTPATIENT
Start: 2017-08-02 | End: 2017-08-04 | Stop reason: HOSPADM

## 2017-08-02 RX ORDER — HYDRALAZINE HYDROCHLORIDE 50 MG/1
100 TABLET, FILM COATED ORAL 3 TIMES DAILY
Status: DISCONTINUED | OUTPATIENT
Start: 2017-08-02 | End: 2017-08-04 | Stop reason: HOSPADM

## 2017-08-02 RX ORDER — FLUOXETINE HYDROCHLORIDE 20 MG/1
20 CAPSULE ORAL DAILY
Status: DISCONTINUED | OUTPATIENT
Start: 2017-08-03 | End: 2017-08-04 | Stop reason: HOSPADM

## 2017-08-02 RX ORDER — SODIUM CHLORIDE 9 MG/ML
125 INJECTION, SOLUTION INTRAVENOUS CONTINUOUS
Status: DISCONTINUED | OUTPATIENT
Start: 2017-08-02 | End: 2017-08-02

## 2017-08-02 RX ORDER — HEPARIN SODIUM 5000 [USP'U]/ML
5000 INJECTION, SOLUTION INTRAVENOUS; SUBCUTANEOUS EVERY 8 HOURS
Status: DISCONTINUED | OUTPATIENT
Start: 2017-08-02 | End: 2017-08-04 | Stop reason: HOSPADM

## 2017-08-02 RX ORDER — ALPRAZOLAM 0.5 MG/1
0.5 TABLET ORAL 2 TIMES DAILY PRN
Status: DISCONTINUED | OUTPATIENT
Start: 2017-08-02 | End: 2017-08-04 | Stop reason: HOSPADM

## 2017-08-02 RX ORDER — INSULIN ASPART 100 [IU]/ML
0-5 INJECTION, SOLUTION INTRAVENOUS; SUBCUTANEOUS
Status: DISCONTINUED | OUTPATIENT
Start: 2017-08-02 | End: 2017-08-04 | Stop reason: HOSPADM

## 2017-08-02 RX ORDER — GABAPENTIN 300 MG/1
300 CAPSULE ORAL NIGHTLY
Status: DISCONTINUED | OUTPATIENT
Start: 2017-08-02 | End: 2017-08-04 | Stop reason: HOSPADM

## 2017-08-02 RX ORDER — ATORVASTATIN CALCIUM 20 MG/1
40 TABLET, FILM COATED ORAL DAILY
Status: DISCONTINUED | OUTPATIENT
Start: 2017-08-03 | End: 2017-08-04 | Stop reason: HOSPADM

## 2017-08-02 RX ORDER — GLUCAGON 1 MG
1 KIT INJECTION
Status: DISCONTINUED | OUTPATIENT
Start: 2017-08-02 | End: 2017-08-04 | Stop reason: HOSPADM

## 2017-08-02 RX ADMIN — STANDARDIZED SENNA CONCENTRATE AND DOCUSATE SODIUM 1 TABLET: 8.6; 5 TABLET, FILM COATED ORAL at 09:08

## 2017-08-02 RX ADMIN — HYDRALAZINE HYDROCHLORIDE 100 MG: 50 TABLET ORAL at 05:08

## 2017-08-02 RX ADMIN — LEVETIRACETAM 750 MG: 750 TABLET ORAL at 09:08

## 2017-08-02 RX ADMIN — SODIUM CHLORIDE 1000 ML: 0.9 INJECTION, SOLUTION INTRAVENOUS at 05:08

## 2017-08-02 RX ADMIN — INSULIN DETEMIR 10 UNITS: 100 INJECTION, SOLUTION SUBCUTANEOUS at 10:08

## 2017-08-02 RX ADMIN — SODIUM CHLORIDE 125 ML/HR: 0.9 INJECTION, SOLUTION INTRAVENOUS at 11:08

## 2017-08-02 RX ADMIN — LABETALOL HCL 500 MG: 200 TABLET, FILM COATED ORAL at 05:08

## 2017-08-02 RX ADMIN — HEPARIN SODIUM 5000 UNITS: 5000 INJECTION, SOLUTION INTRAVENOUS; SUBCUTANEOUS at 05:08

## 2017-08-02 RX ADMIN — GABAPENTIN 300 MG: 300 CAPSULE ORAL at 09:08

## 2017-08-02 RX ADMIN — PHENYTOIN SODIUM 300 MG: 100 CAPSULE ORAL at 09:08

## 2017-08-02 NOTE — SUBJECTIVE & OBJECTIVE
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    Cortical cataract of both eyes 7/26/2016    Degeneration of lumbar or lumbosacral intervertebral disc 3/5/2013    S/p MRI L-spine 5/2009     Depression     Diabetes with neurologic complications     General anesthetics causing adverse effect in therapeutic use     Hemorrhagic cerebrovascular accident (CVA)     8/2016 s/p Hemicraniotomy at [facility] with Left hemiparesis    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    Rheumatoid arthritis(714.0)     Rotator cuff tear 7/2/2014    S/P breast biopsy, left 3/5/2013    Benign Breast Bx     S/P R shoulder surgery, SAD, DCE, biceps tenotomy 7/15/2014    S/P total hysterectomy 7/10/2013    Sarcoidosis     Sleep apnea     CPAP    Type 2 diabetes mellitus with stage 3 chronic kidney disease, without long-term current use of insulin 10/22/2015    Type II or unspecified type diabetes mellitus with renal manifestations, uncontrolled     Vertebral artery  stenosis 3/5/2013    S/p Stenting per Dr Burnett        Past Surgical History:   Procedure Laterality Date    breast reduction      BREAST SURGERY      breast reduction    COLONOSCOPY N/A 8/11/2016    Procedure: COLONOSCOPY;  Surgeon: Jerry Vilchis MD;  Location: [facility] ([address] [place] [address]);  Service: Endoscopy;  Laterality: N/A;  Patient reports difficulty awaking from anesthesia in the past.    HYSTERECTOMY      ROTATOR CUFF REPAIR Right July 9, 2014    right side    stent placed      in vertebral artery    TUBAL LIGATION         Review of patient's allergies indicates:   Allergen Reactions    Lisinopril Other (See Comments)     Angioedema      Vicodin [hydrocodone-acetaminophen] Rash       No current facility-administered medications on file prior to encounter.      Current Outpatient Prescriptions on File Prior to Encounter   Medication Sig    albuterol (PROVENTIL) 2.5 mg /3 mL (0.083 %) nebulizer solution Take 3 mLs (2.5 mg total) by nebulization every 6 (six) hours as needed for Wheezing.    albuterol (PROVENTIL) 2.5 mg /3 mL (0.083 %) nebulizer solution Take 3 mLs (2.5 mg total) by nebulization every 6 (six) hours as needed for Wheezing. Rescue    alprazolam (XANAX) 0.5 MG tablet 1/2 to 1 tab Q8 hours as needed for anxiety    amlodipine (NORVASC) 10 MG tablet Take 1 tablet (10 mg total) by mouth once daily.    aspirin (ECOTRIN) 81 MG EC tablet Take 81 mg by mouth once daily.      atorvastatin (LIPITOR) 40 MG tablet Take 1 tablet (40 mg total) by mouth once daily. For Cholesterol    clotrimazole-betamethasone 1-0.05% (LOTRISONE) cream Apply topically 2 (two) times daily. Apply to area of rash/iting on buttocks 1-2x/day as needed    colchicine 0.6 mg tablet 1 tab daily as needed for gout flare    famotidine (PEPCID) 20 MG tablet TAKE 1 TABLET (20 MG TOTAL) BY MOUTH ONCE DAILY.    fluoxetine (PROZAC) 20 MG capsule Take 20 mg by mouth once daily.    fluoxetine 20 MG tablet TAKE 1 TABLET (20 MG TOTAL)  "BY MOUTH ONCE DAILY.    furosemide (LASIX) 40 MG tablet Take 1 tablet (40 mg total) by mouth once daily.    gabapentin (NEURONTIN) 300 MG capsule 1 cap at Bedtime    hydrALAZINE (APRESOLINE) 100 MG tablet Take 1 tablet (100 mg total) by mouth 3 (three) times daily.    hydrochlorothiazide (HYDRODIURIL) 25 MG tablet Take 25 mg by mouth once daily.    insulin detemir (LEVEMIR FLEXTOUCH) 100 unit/mL (3 mL) SubQ InPn pen 14 units in AM    labetalol (NORMODYNE) 100 MG tablet 5 tablets (500 mg total) by Per G Tube route every 8 (eight) hours.    levetiracetam (KEPPRA) 500 MG Tab Take 4 tablets (2,000 mg total) by mouth 2 (two) times daily. (Patient taking differently: Take 500 mg by mouth 3 (three) times daily. )    levetiracetam (KEPPRA) 750 MG Tab Take 750 mg by mouth 2 (two) times daily. 2 tabs BID    phenytoin (DILANTIN) 300 MG ER capsule Take 300 mg by mouth every evening.    spironolactone (ALDACTONE) 50 MG tablet Take 1 tablet (50 mg total) by mouth once daily.    BD INSULIN PEN NEEDLE UF SHORT 31 gauge x 5/16" Ndle USE TO INJECT NOVOLOG FLEXPEN BEFORE MEALS    blood sugar diagnostic (ACCU-CHEK SMARTVIEW TEST STRIP) Strp 1 strip by Misc.(Non-Drug; Combo Route) route 2 (two) times daily.    multivitamin with iron Tab 1/day (Patient taking differently: Take 1 tablet by mouth once daily. )    nut.tx.gluc.intol,lac-free,soy (GLUCERNA 1.5 NOAH) Liqd 1.5 cans in AM, 1 can at Noon , 1.5cans at Dinner, and 1 can before Bed/Total 5 cans daily    nut.tx.gluc.intol,lac-free,soy (GLUCERNA) Liqd 1 can 4x/day     Family History     Problem Relation (Age of Onset)    Bell's palsy Sister    Blindness Maternal Grandmother    Cancer Mother (55)    Diabetes Mother,     Heart attack Mother    Heart disease Mother    Hypertension Mother, Sister    Lupus Sister    No Known Problems Daughter, Son    Sleep apnea Sister    Stroke Sister, Maternal Aunt        Social History Main Topics    Smoking status: Never Smoker    " Smokeless tobacco: Never Used    Alcohol use No      Comment: occasional wine cooler     Drug use: No    Sexual activity: Yes     Partners: Male     Review of Systems   Constitutional: Negative for activity change, chills, fever and unexpected weight change.   HENT: Negative for ear discharge, ear pain, mouth sores and trouble swallowing.    Eyes: Negative for pain and redness.   Respiratory: Negative for cough and shortness of breath.    Cardiovascular: Negative for chest pain and palpitations.   Gastrointestinal: Negative for abdominal distention, abdominal pain, blood in stool and nausea.   Endocrine: Negative for polydipsia and polyphagia.   Genitourinary: Negative for dysuria and hematuria.   Musculoskeletal: Negative for neck stiffness.   Skin: Negative for rash.   Neurological: Positive for seizures. Negative for numbness.   Hematological: Negative for adenopathy. Does not bruise/bleed easily.   Psychiatric/Behavioral: Negative for decreased concentration and sleep disturbance.     Objective:     Vital Signs (Most Recent):  Temp: 97.3 °F (36.3 °C) (08/02/17 1551)  Pulse: 66 (08/02/17 1551)  Resp: 18 (08/02/17 1551)  BP: 122/62 (08/02/17 1551)  SpO2: 98 % (08/02/17 1301) Vital Signs (24h Range):  Temp:  [97.3 °F (36.3 °C)-98.4 °F (36.9 °C)] 97.3 °F (36.3 °C)  Pulse:  [62-71] 66  Resp:  [16-18] 18  SpO2:  [98 %] 98 %  BP: (122-169)/(62-83) 122/62     Weight: 75.5 kg (166 lb 7.2 oz)  Body mass index is 29.48 kg/m².    Physical Exam   Constitutional: She is oriented to person, place, and time. Vital signs are normal. She appears well-developed and well-nourished. No distress.   HENT:   Head: Normocephalic and atraumatic.   Eyes: Pupils are equal, round, and reactive to light. No scleral icterus.   Neck: Neck supple. No thyromegaly present.   Cardiovascular: Normal rate, regular rhythm and normal heart sounds.    No murmur heard.  Pulmonary/Chest: Effort normal and breath sounds normal.   Abdominal: Soft. Bowel  sounds are normal. She exhibits no distension. There is no tenderness.   Musculoskeletal: She exhibits no edema or tenderness.   Diffuse LE muscle atrophy is noted with left sided hemiparesis consistent with previous stroke history. Pt is awake, oriented, mildly slow to respond. Cooperative with exam.     Lymphadenopathy:     She has no cervical adenopathy.   Neurological: She is alert and oriented to person, place, and time. She exhibits normal muscle tone. Coordination normal.   Skin: No rash noted. No erythema.   Psychiatric: She has a normal mood and affect.        Significant Labs:   BMP:   Recent Labs  Lab 08/02/17  1120   *      K 4.8      CO2 18*   BUN 85*   CREATININE 2.8*   CALCIUM 9.8   MG 2.3     CBC:   Recent Labs  Lab 08/02/17  1120 08/02/17  1131   WBC 5.68  --    HGB 8.5*  --    HCT 26.8* 26*     --      CMP:   Recent Labs  Lab 08/02/17  1120      K 4.8      CO2 18*   *   BUN 85*   CREATININE 2.8*   CALCIUM 9.8   PROT 7.3   ALBUMIN 3.5   BILITOT 0.3   ALKPHOS 107   AST 14   ALT 11   ANIONGAP 14   EGFRNONAA 17.9*     Magnesium:   Recent Labs  Lab 08/02/17  1120   MG 2.3       Significant Imaging:

## 2017-08-02 NOTE — H&P
[facility] Hospital Medicine  History & Physical    Patient Name: Lucy Perez  MRN: [id number]  Admission Date: 8/2/2017  Attending Physician: Edna Waller MD   Primary Care Provider: Beverly Muniz MD    [facility] Medicine Team: [facility] HOSP MED 4 MARTIN Okeefe     Patient information was obtained from patient, spouse/SO, relative(s), past medical records and ER records.     Subjective:     Principal Problem:Acute kidney injury (nontraumatic)    Chief Complaint:   Chief Complaint   Patient presents with    Abnormal Lab     BUN, Cr, K elevated per PCP        HPI: 56F s/p R-MCA stroke with R-putaminal hemorrhagic transformation in 8/2016 and 11/2016 (s/p hemicraniotomy at [facility]) with residual L hemiparesis, on AED s/p CVA (keppra/tegretol). Tegretol stopped last admission, PEG status, chronic diastolic heart failure, DM2, HTN, HLD, sarcoidosis, RA, CKDIII. Preset to the ER by PCP advise for abnormal labs ''  DARLING with worsening acidosis, hyperkalemia and low UOP'' . Patient denied any new symptoms from her last admission. In the last admission she wasd treated for UTI (  ENTEROBACTER treated with cipro ) she denied any poor oral intake, diarrhea, reduce water intake per PEG per . Also denied any abdominal pain, burning urine, fever, chills, cough, chest pain, skin boils.   She was on diurtics ( HCTZ, lasix and aldactone ) plus famotidine.       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     Cortical cataract of both eyes 7/26/2016    Degeneration of lumbar or lumbosacral intervertebral disc 3/5/2013    S/p MRI L-spine 5/2009     Depression     Diabetes with neurologic complications     General anesthetics causing adverse effect in therapeutic use     Hemorrhagic cerebrovascular accident (CVA)     8/2016 s/p [facility]raniot[facility] at [facility] with Left hemiparesis    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    Rheumatoid arthritis(714.0)     Rotator cuff tear 7/2/2014    S/P breast biopsy, left 3/5/2013    Benign Breast Bx     S/P R shoulder surgery, SAD, DCE, biceps tenotomy 7/15/2014    S/P total hysterectomy 7/10/2013    Sarcoidosis     Sleep apnea     CPAP    Type 2 diabetes mellitus with stage 3 chronic kidney disease, without long-term current use of insulin 10/22/2015    Type II or unspecified type diabetes mellitus with renal manifestations, uncontrolled     Vertebral artery stenosis 3/5/2013    S/p Stenting per Dr Burnett        Past Surgical History:   Procedure Laterality Date    breast reduction      BREAST SURGERY      breast reduction    COLONOSCOPY N/A 8/11/2016    Procedure: COLONOSCOPY;  Surgeon: Jerry Vilchis MD;  Location: [address]);  Service: Endoscopy;  Laterality: N/A;  Patient reports difficulty awaking from anesthesia in the past.    HYSTERECTOMY      ROTATOR CUFF REPAIR Right July 9, 2014    right side    stent placed      in vertebral artery    TUBAL LIGATION          Review of patient's allergies indicates:   Allergen Reactions    Lisinopril Other (See Comments)     Angioedema      Vicodin [hydrocodone-acetaminophen] Rash       No current facility-administered medications on file prior to encounter.      Current Outpatient Prescriptions on File Prior to Encounter   Medication Sig    albuterol (PROVENTIL) 2.5 mg /3 mL (0.083 %) nebulizer solution Take 3 mLs (2.5 mg total) by nebulization every 6 (six) hours as needed for Wheezing.    albuterol (PROVENTIL) 2.5 mg /3 mL (0.083 %) nebulizer solution Take 3 mLs (2.5 mg total) by nebulization every 6 (six) hours as needed for Wheezing. Rescue    alprazolam (XANAX) 0.5 MG tablet 1/2 to 1 tab Q8 hours as needed for anxiety    amlodipine (NORVASC) 10 MG tablet Take 1 tablet (10 mg total) by mouth once daily.    aspirin (ECOTRIN) 81 MG EC tablet Take 81 mg by mouth once daily.      atorvastatin (LIPITOR) 40 MG tablet Take 1 tablet (40 mg total) by mouth once daily. For Cholesterol    clotrimazole-betamethasone 1-0.05% (LOTRISONE) cream Apply topically 2 (two) times daily. Apply to area of rash/iting on buttocks 1-2x/day as needed    colchicine 0.6 mg tablet 1 tab daily as needed for gout flare    famotidine (PEPCID) 20 MG tablet TAKE 1 TABLET (20 MG TOTAL) BY MOUTH ONCE DAILY.    fluoxetine (PROZAC) 20 MG capsule Take 20 mg by mouth once daily.    fluoxetine 20 MG tablet TAKE 1 TABLET (20 MG TOTAL) BY MOUTH ONCE DAILY.    furosemide (LASIX) 40 MG tablet Take 1 tablet (40 mg total) by mouth once daily.    gabapentin (NEURONTIN) 300 MG capsule 1 cap at Bedtime    hydrALAZINE (APRESOLINE) 100 MG tablet Take 1 tablet (100 mg total) by mouth 3 (three) times daily.    hydrochlorothiazide (HYDRODIURIL) 25 MG tablet Take 25 mg by mouth once daily.    insulin detemir (LEVEMIR FLEXTOUCH) 100 unit/mL (3 mL) SubQ InPn pen 14 units in AM    labetalol (NORMODYNE) 100 MG tablet 5 tablets (500 mg total) by Per G Tube route every  "8 (eight) hours.    levetiracetam (KEPPRA) 500 MG Tab Take 4 tablets (2,000 mg total) by mouth 2 (two) times daily. (Patient taking differently: Take 500 mg by mouth 3 (three) times daily. )    levetiracetam (KEPPRA) 750 MG Tab Take 750 mg by mouth 2 (two) times daily. 2 tabs BID    phenytoin (DILANTIN) 300 MG ER capsule Take 300 mg by mouth every evening.    spironolactone (ALDACTONE) 50 MG tablet Take 1 tablet (50 mg total) by mouth once daily.    BD INSULIN PEN NEEDLE UF SHORT 31 gauge x 5/16" Ndle USE TO INJECT NOVOLOG FLEXPEN BEFORE MEALS    blood sugar diagnostic (ACCU-CHEK SMARTVIEW TEST STRIP) Strp 1 strip by Misc.(Non-Drug; Combo Route) route 2 (two) times daily.    multivitamin with iron Tab 1/day (Patient taking differently: Take 1 tablet by mouth once daily. )    nut.tx.gluc.intol,lac-free,soy (GLUCERNA 1.5 NOAH) Liqd 1.5 cans in AM, 1 can at Noon , 1.5cans at Dinner, and 1 can before Bed/Total 5 cans daily    nut.tx.gluc.intol,lac-free,soy (GLUCERNA) Liqd 1 can 4x/day     Family History     Problem Relation (Age of Onset)    Bell's palsy Sister    Blindness Maternal Grandmother    Cancer Mother (55)    Diabetes Mother,     Heart attack Mother    Heart disease Mother    Hypertension Mother, Sister    Lupus Sister    No Known Problems Daughter, Son    Sleep apnea Sister    Stroke Sister, Maternal Aunt        Social History Main Topics    Smoking status: Never Smoker    Smokeless tobacco: Never Used    Alcohol use No      Comment: occasional wine cooler     Drug use: No    Sexual activity: Yes     Partners: Male     Review of Systems   Constitutional: Negative for activity change, chills, fever and unexpected weight change.   HENT: Negative for ear discharge, ear pain, mouth sores and trouble swallowing.    Eyes: Negative for pain and redness.   Respiratory: Negative for cough and shortness of breath.    Cardiovascular: Negative for chest pain and palpitations.   Gastrointestinal: Negative for " abdominal distention, abdominal pain, blood in stool and nausea.   Endocrine: Negative for polydipsia and polyphagia.   Genitourinary: Negative for dysuria and hematuria.   Musculoskeletal: Negative for neck stiffness.   Skin: Negative for rash.   Neurological: Positive for seizures. Negative for numbness.   Hematological: Negative for adenopathy. Does not bruise/bleed easily.   Psychiatric/Behavioral: Negative for decreased concentration and sleep disturbance.     Objective:     Vital Signs (Most Recent):  Temp: 97.3 °F (36.3 °C) (08/02/17 1551)  Pulse: 66 (08/02/17 1551)  Resp: 18 (08/02/17 1551)  BP: 122/62 (08/02/17 1551)  SpO2: 98 % (08/02/17 1301) Vital Signs (24h Range):  Temp:  [97.3 °F (36.3 °C)-98.4 °F (36.9 °C)] 97.3 °F (36.3 °C)  Pulse:  [62-71] 66  Resp:  [16-18] 18  SpO2:  [98 %] 98 %  BP: (122-169)/(62-83) 122/62     Weight: 75.5 kg (166 lb 7.2 oz)  Body mass index is 29.48 kg/m².    Physical Exam   Constitutional: She is oriented to person, place, and time. Vital signs are normal. She appears well-developed and well-nourished. No distress.   HENT:   Head: Normocephalic and atraumatic.   Eyes: Pupils are equal, round, and reactive to light. No scleral icterus.   Neck: Neck supple. No thyromegaly present.   Cardiovascular: Normal rate, regular rhythm and normal heart sounds.    No murmur heard.  Pulmonary/Chest: Effort normal and breath sounds normal.   Abdominal: Soft. Bowel sounds are normal. She exhibits no distension. There is no tenderness.   Musculoskeletal: She exhibits no edema or tenderness.   Diffuse LE muscle atrophy is noted with left sided hemiparesis consistent with previous stroke history. Pt is awake, oriented, mildly slow to respond. Cooperative with exam.     Lymphadenopathy:     She has no cervical adenopathy.   Neurological: She is alert and oriented to person, place, and time. She exhibits normal muscle tone. Coordination normal.   Skin: No rash noted. No erythema.   Psychiatric:  She has a normal mood and affect.        Significant Labs:   BMP:   Recent Labs  Lab 08/02/17  1120   *      K 4.8      CO2 18*   BUN 85*   CREATININE 2.8*   CALCIUM 9.8   MG 2.3     CBC:   Recent Labs  Lab 08/02/17  1120 08/02/17  1131   WBC 5.68  --    HGB 8.5*  --    HCT 26.8* 26*     --      CMP:   Recent Labs  Lab 08/02/17  1120      K 4.8      CO2 18*   *   BUN 85*   CREATININE 2.8*   CALCIUM 9.8   PROT 7.3   ALBUMIN 3.5   BILITOT 0.3   ALKPHOS 107   AST 14   ALT 11   ANIONGAP 14   EGFRNONAA 17.9*     Magnesium:   Recent Labs  Lab 08/02/17  1120   MG 2.3       Significant Imaging:     Assessment/Plan:     History of stroke    -- continue statin, ASA            PEG (percutaneous endoscopic gastrostomy) status              Partial symptomatic epilepsy with complex partial seizures, not intractable, without status epilepticus    -- continue Keppra 750 mg BID, that is what family reported.           Left spastic hemiparesis    -- 2/2 stroke           Left ventricular diastolic dysfunction with preserved systolic function      -- continue BB   -- monitor I/O           Type 2 diabetes mellitus with stage 3 chronic kidney disease, without long-term current use of insulin      -- start home levemir 10 units   -- ISS   -- goal BS~140         Stage 3 chronic kidney disease    -- with proteinuria likely 2/2 DM/HTN          Mixed anxiety and depressive disorder    -- fluoxetine 20 mg daily           Renovascular hypertension    -- continue amlodipine, labetalol and hydralazine   -- stopped ACE from last admission.           * Acute kidney injury (nontraumatic)    -- noted from last admission with worsening crea and BUN   -- DDx include pre renal with decrease arterial effective perfusion vs renal vs obstructive   -- will hold diuretics  -- continue PEG free water.   -- 1 LNS   -- urine studies, UA and urine Cx.   -- consider kidney US in the AM if no improvements.                VTE Risk Mitigation         Ordered     heparin (porcine) injection 5,000 Units  Every 8 hours     Route:  Subcutaneous        08/02/17 1354     Medium Risk of VTE  Once      08/02/17 1354     Place LUIS hose  Until discontinued      08/02/17 1354        MARTIN Okeefe  Department of Hospital Medicine   [facility]

## 2017-08-02 NOTE — PLAN OF CARE
Problem: Patient Care Overview  Goal: Plan of Care Review  Outcome: Ongoing (interventions implemented as appropriate)  POC reviewed with pt and spouse. CBG checked as ordered.  Monitor for signs/symptoms of hypoglycemia. Turn Q2 to prevent skin breakdown, wedge applied for additional comfort. Pt free from falls or injury throughout shift. Will continue to monitor.

## 2017-08-02 NOTE — H&P
Please see progress note from today by myself and Dr. Escalante as this is the H&P note for patient and incorrectly marked as just a progress note.    RADHA SKINNER MD  Attending Staff Physician   [facility] Medicine  Pager: [phone]  Spectralink: [phone]

## 2017-08-02 NOTE — TELEPHONE ENCOUNTER
Spoke with pt's daughter, Reina joseph her Mom's labs(7/31)-showed K+ at 5.8 and BUN/Creat at 87/2.7(last at 23/1.7) with GFR at 22(last at 37) and H/H down to 7.8/24%.  I've recommended ER evaluation for IVFs/treatment of ARF. Daughter will call ambulance for her mom to go to ER.  Tione, please call ER Charge nurse to notify them of pt's coming to ER.  thanks

## 2017-08-02 NOTE — ASSESSMENT & PLAN NOTE
-- noted from last admission with worsening crea and BUN   -- DDx include pre renal with decrease arterial effective perfusion vs renal vs obstructive   -- will hold diuretics  -- continue PEG free water.   -- 1 LNS   -- urine studies, UA and urine Cx.   -- consider kidney US in the AM if no improvements.

## 2017-08-02 NOTE — ED NOTES
"Pt arrived via EMS. Pt states "my doctor called me this morning and told me my labs were off and to come to the ER".  Pt c/o left leg pain when it moves. Pt denies chest pain, SOB, N/V, fever, chills.   "

## 2017-08-02 NOTE — ED PROVIDER NOTES
Encounter Date: 8/2/2017    SCRIBE #1 NOTE: I, Chandler Owen, am scribing for, and in the presence of,  Fabian Ross MD. I have scribed the entire note.       History     Chief Complaint   Patient presents with    Abnormal Lab     BUN, Cr, K elevated per PCP     Time seen by provider: 11:10 AM    This is a 56 y.o. female with PMH of DM, HTN, HLD, Peripheral neuropathy, Bipolar disorder, Sarcoidosis, CKD, PNA, CVA with residual left sided paralysis and Seizure disorder sent by PCP for evaluation of abnormal outpatient renal function labs. Pt states she feels okay other than some dark colored urine recently as well as LLE pain for 2 days duration attributed to Hx of neuropathy. No recent falls or injuries.  Denies any CP, SOB, fever, chills, nausea, difficulty swallowing or recent seizure activity. Also denies any worsening of chronic left sided weakness from previous stroke. No recent CHF or edema noted. Last seizure activity was 2/3 weeks ago per family at bedside. No further complaints or concerns at this time.         The history is provided by the patient, medical records and a relative.     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    Cortical cataract of both eyes 7/26/2016    Degeneration of lumbar or lumbosacral intervertebral disc 3/5/2013    S/p MRI L-spine 5/2009     Depression     Diabetes with neurologic  "complications     General anesthetics causing adverse effect in therapeutic use     Hemorrhagic cerebrovascular accident (CVA)     8/2016 s/p Hemicraniotomy at [facility] [place] with Left hemiparesis    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    Hyperlipidemia     Hypertension     Hypertensive retinopathy of both eyes 7/26/2016    Impingement syndrome of right shoulder 7/2/2014    Obesity     Partial symptomatic epilepsy with complex partial seizures, not intractable, without status epilepticus     PEG (percutaneous endoscopic gastrostomy) status 6/28/2017    Peripheral neuropathy     Pneumonia     Rheumatoid arthritis(714.0)     Rotator cuff tear 7/2/2014    S/P breast biopsy, left 3/5/2013    Benign Breast Bx     S/P R shoulder surgery, SAD, DCE, biceps tenotomy 7/15/2014    S/P total hysterectomy 7/10/2013    Sarcoidosis     Sleep apnea     CPAP    Type II or unspecified type diabetes mellitus with renal manifestations, uncontrolled     Vertebral artery stenosis 3/5/2013    S/p Stenting per Dr Burnett      Past Surgical History:   Procedure Laterality Date    breast reduction      BREAST SURGERY      breast reduction    COLONOSCOPY N/A 8/11/2016    Procedure: COLONOSCOPY;  Surgeon: Jerry Vilchis MD;  Location: [facility] ([address] [place] [address]);  Service: Endoscopy;  Laterality: N/A;  Patient reports difficulty awaking from anesthesia in the past.    HYSTERECTOMY      ROTATOR CUFF REPAIR Right July 9, 2014    right side    stent placed      in vertebral artery    TUBAL LIGATION       Family History   Problem Relation Age of Onset    Cancer Mother 55     Breast cancer    Diabetes Mother     Hypertension Mother     Heart disease Mother     Heart attack Mother     Stroke Sister     Hypertension Sister     Sleep apnea Sister     No Known Problems Daughter     No Known Problems Son     Bell's palsy Sister     Lupus Sister     Blindness Maternal Grandmother     Diabetes       "My entire family family has diabetes"    " Stroke Maternal Aunt     Amblyopia Neg Hx     Cataracts Neg Hx     Glaucoma Neg Hx     Macular degeneration Neg Hx     Retinal detachment Neg Hx     Strabismus Neg Hx      Social History   Substance Use Topics    Smoking status: Never Smoker    Smokeless tobacco: Never Used    Alcohol use No      Comment: occasional wine cooler      Review of Systems   Constitutional: Negative for chills and fever.   HENT: Negative for sore throat and trouble swallowing.    Respiratory: Negative for shortness of breath.    Cardiovascular: Negative for chest pain.   Gastrointestinal: Negative for diarrhea, nausea and vomiting.   Genitourinary: Negative for dysuria.        (+) dark colored urine   Musculoskeletal: Negative for back pain.        (+) LLE pain   Skin: Negative for rash.   Neurological: Negative for seizures and weakness (no change).   Hematological: Does not bruise/bleed easily.       Physical Exam     Initial Vitals [08/02/17 1034]   BP Pulse Resp Temp SpO2   (!) 160/64 62 16 98.4 °F (36.9 °C) 98 %      MAP       96         Physical Exam    Vitals reviewed.  Constitutional:   56 year old chronically ill appearing  female in no acute distress.    HENT:   Head: Normocephalic and atraumatic.   No intraoral lesions identified   Eyes: EOM are normal.   Mildly divergent gaze noted   Neck: Normal range of motion. Neck supple. No tracheal deviation present. No JVD present.   Cardiovascular: Normal rate and intact distal pulses.   Murmur (3/6 KIERSTEN loudest at the left proximal parasternal border) heard.  Pulmonary/Chest: Breath sounds normal. No stridor. No respiratory distress. She has no wheezes. She has no rhonchi. She has no rales.   Abdominal: Soft. She exhibits no distension. There is no tenderness.   Abdomen is obese   Musculoskeletal: Normal range of motion. She exhibits no edema.   Neurological: She is alert and oriented to person, place, and time.   Diffuse LE muscle atrophy is noted with left  sided hemiparesis consistent with previous stroke history. Pt is awake, oriented, mildly slow to respond. Cooperative with exam.    Psychiatric: Her behavior is normal. Thought content normal.         ED Course   Procedures  Labs Reviewed   CBC W/ AUTO DIFFERENTIAL - Abnormal; Notable for the following:        Result Value    RBC 2.84 (*)     Hemoglobin 8.5 (*)     Hematocrit 26.8 (*)     MCHC 31.7 (*)     RDW 15.5 (*)     Lymph # 0.9 (*)     Lymph% 15.7 (*)     All other components within normal limits   COMPREHENSIVE METABOLIC PANEL - Abnormal; Notable for the following:     CO2 18 (*)     Glucose 142 (*)     BUN, Bld 85 (*)     Creatinine 2.8 (*)     eGFR if  20.7 (*)     eGFR if non  17.9 (*)     All other components within normal limits   ISTAT PROCEDURE - Abnormal; Notable for the following:     POC Glucose 145 (*)     POC BUN 81 (*)     POC Creatinine 2.7 (*)     POC TCO2 (MEASURED) 22 (*)     POC Hematocrit 26 (*)     All other components within normal limits   CULTURE, URINE   MAGNESIUM   PROTIME-INR   PHENYTOIN LEVEL, TOTAL AND FREE   SODIUM, URINE, RANDOM   CREATININE, URINE, RANDOM   UREA NITROGEN, URINE, RANDOM   URINALYSIS   ISTAT CHEM8             Medical Decision Making:   History:   Old Medical Records: I decided to obtain old medical records.  Differential Diagnosis:   Lethal Arrhythmia  Electrolyte derangement  Dehydration  Adverse drug reaction  Clinical Tests:   Lab Tests: Ordered and Reviewed            Scribe Attestation:   Scribe #1: I performed the above scribed service and the documentation accurately describes the services I performed. I attest to the accuracy of the note.    Attending Attestation:           Physician Attestation for Scribe:  Physician Attestation Statement for Scribe #1: I, Fabian Ross MD, reviewed documentation, as scribed by Chandler Owen in my presence, and it is both accurate and complete.         Attending ED Notes:   Emergency  department evaluation today reveals evidence of worsening renal insufficiency with significant azotemia.  The patient will be admitted to the internal medicine service in stable condition for further therapy and management.          ED Course     Clinical Impression:   The primary encounter diagnosis was Acute kidney injury (nontraumatic). A diagnosis of DARLING (acute kidney injury) was also pertinent to this visit.    Disposition:   Disposition: Admitted                        Fabian Ross MD  08/03/17 7997

## 2017-08-02 NOTE — HPI
56F s/p R-MCA stroke with R-putaminal hemorrhagic transformation in 8/2016 and 11/2016 (s/p hemicraniotomy at [facility] [place]) with residual L hemiparesis, on AED s/p CVA (keppra/tegretol). Tegretol stopped last admission, PEG status, chronic diastolic heart failure, DM2, HTN, HLD, sarcoidosis, RA, CKDIII. Preset to the ER by PCP advise for abnormal labs ''  DARLING with worsening acidosis, hyperkalemia and low UOP'' . Patient denied any new symptoms from her last admission. In the last admission she wasd treated for UTI (  ENTEROBACTER treated with cipro ) she denied any poor oral intake, diarrhea, reduce water intake per PEG per . Also denied any abdominal pain, burning urine, fever, chills, cough, chest pain, skin boils.   She was on diurtics ( HCTZ, lasix and aldactone ) plus famotidine.

## 2017-08-02 NOTE — MEDICAL/APP STUDENT
[facility]  History & Physical    SUBJECTIVE:     Chief Complaint/Reason for Admission: DARLING    History of Present Illness:  Patient is a 56 y.o. female with a complex PMHx including CKD Stage III; prior stroke with residual left-sided hemiparesis, seizures, and aphasia; insulin-dependent DM-2; sarcoidosis; hyperlipidemia; and HTN; who presents to the ED on advice of her PCP for lab results concerning for DARLING, including creatinine of 2.7 and potassium of 5.8, drawn by home health nurse on Monday 7/31. Repeat labs drawn on arrival to ED demonstrated normalized potassium at 4.8 and persistent creatinine elevation at 2.8    Per Pt's  and daughter: Pt visited by home health nurse on Monday when labs were drawn; upon viewing results this morning, PCP urged Pt to present to ED. Pt has recently had more frequent episodes of somnolence from her baseline, with varying degrees of aphasia and ability to converse. She is seen by a home health nurse weekly, as well as a speech therapist weekly, and was noted Monday to have a wheeze that prompted concern for possible aspiration, as Pt has recently been advanced to a pureed diet in addition to PEG tube feeds. Pt's  states she takes about two 16 oz bottles of water daily, which is unchanged of late, and her daughter reports Pt has had decreased urinary frequency from usual 4 x day to twice per day over the past few days. No symptoms of chest pain, SOB, nausea, vomiting, diarrhea, or dysuria.         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    Cortical cataract of both eyes 7/26/2016    Degeneration of lumbar or lumbosacral intervertebral disc 3/5/2013    S/p MRI L-spine 5/2009     Depression     Diabetes with neurologic complications     General anesthetics causing adverse effect in therapeutic use     Hemorrhagic cerebrovascular accident (CVA)     8/2016 [place] [facility] [place] hemiparesis    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    Hyperlipidemia     Hypertension     Hypertensive retinopathy of both eyes 7/26/2016    Impingement syndrome of right shoulder 7/2/2014    Obesity     Partial symptomatic epilepsy with complex partial seizures, not intractable, without status epilepticus     PEG (percutaneous endoscopic gastrostomy) status 6/28/2017    Peripheral neuropathy     Pneumonia     Rheumatoid arthritis(714.0)     Rotator cuff tear 7/2/2014    S/P breast biopsy, left 3/5/2013    Benign Breast Bx     S/P R shoulder surgery, SAD, DCE, biceps tenotomy 7/15/2014    S/P total hysterectomy 7/10/2013    Sarcoidosis     Sleep apnea     CPAP    Type II or unspecified type diabetes mellitus with renal manifestations, uncontrolled     Vertebral artery stenosis 3/5/2013    S/p Stenting per Dr Burnett      Past Surgical History:   Procedure Laterality Date    breast reduction      BREAST SURGERY      breast reduction    COLONOSCOPY N/A 8/11/2016    Procedure: COLONOSCOPY;  Surgeon: Jerry Vilchis MD;  Location: [facility] ([address] [place] [address]);  Service: Endoscopy;  Laterality: N/A;  Patient reports difficulty awaking from anesthesia in the past.    HYSTERECTOMY      ROTATOR CUFF REPAIR Right July 9, 2014    right side    stent placed      in vertebral artery    TUBAL LIGATION       Family History   Problem Relation Age of Onset    Cancer Mother 55     Breast  "cancer    Diabetes Mother     Hypertension Mother     Heart disease Mother     Heart attack Mother     Stroke Sister     Hypertension Sister     Sleep apnea Sister     No Known Problems Daughter     No Known Problems Son     Bell's palsy Sister     Lupus Sister     Blindness Maternal Grandmother     Diabetes       "My entire family family has diabetes"    Stroke Maternal Aunt     Amblyopia Neg Hx     Cataracts Neg Hx     Glaucoma Neg Hx     Macular degeneration Neg Hx     Retinal detachment Neg Hx     Strabismus Neg Hx      Social History   Substance Use Topics    Smoking status: Never Smoker    Smokeless tobacco: Never Used    Alcohol use No      Comment: occasional wine cooler         Review of Systems:  Constitutional: Negative for fever, unexplained weight loss, or appetite change, positive for decreased activity level  HEENT: Negative for nasal congestion, negative for sore throat  Cardiovascular: Negative for chest pain or palpitations  Pulmonary: Negative for shortness of breath, positive for cough  Gastrointestinal: Negative for abdominal pain, N/V, diarrhea, or constipation  Genitourinary: Negative for dysuria, positive for decreased urinary frequency, positive for flank pain  Endocrine: Negative for heat intolerance, negative for cold intolerance  Neurological: Negative for headache, negative for dizziness, positive for weakness (hemiparesis s/p stroke 8/2016), positive for seizures  Musculoskeletal: Positive for back pain and LLE pain  Hematologicall: Negative for increased bleeding  Psychiatric: Positive for confusion      OBJECTIVE:     Vital Signs (Most Recent):  Temp: 98.4 °F (36.9 °C) (08/02/17 1034)  Pulse: 71 (08/02/17 1301)  Resp: 16 (08/02/17 1034)  BP: (!) 169/83 (08/02/17 1301)  SpO2: 98 % (08/02/17 1301)    Physical Exam:  General: AAOx3, appears well-nourished, appears comfortable  HENT:  Head: Normocephalic and atraumatic  Eyes: Pupils equal, round, and reactive to " light; conjunctivae normal; left-beating nystagmus noted on horizontal gaze  Nose: Nares appear normal  Oropharynx: Moist mucous membranes, no erythema or tonsillar exudate   Cardiovascular: Normal rate, regular rhythm, normal S1/S2, distal pulses intact  Respiratory: No accessory muscle use, coarse breath sounds throughout lung fields, maximal over Rt middle and lower lobes  Abdominal: Soft, nontender, normal bowel sounds present, PEG tube in place  Renal: CVA tenderness B/L  Neurological:  CN 1-12: Lt-beating horizontal nystagmus as above, decreased hearing Rt ear, CN 1-12 otherwise grossly intact.  No sensory deficits noted, RUE 5/5 strength, LUE 0/5 strength, spasticity to LUE, DTR 2+ on RUE, DTR 3+ on LUE, significant wasting of B/L lower limbs  Musculoskeletal: Joints and lower limb musculature nontender to palpation, left knee and thigh pain elicited on flexion and extension of the limb  Psychiatric: AAOx3, expressive aphasia/speech slowed (baseline per family)    Laboratory:  Recent Results (from the past 24 hour(s))   CBC auto differential    Collection Time: 08/02/17 11:20 AM   Result Value Ref Range    WBC 5.68 3.90 - 12.70 K/uL    RBC 2.84 (L) 4.00 - 5.40 M/uL    Hemoglobin 8.5 (L) 12.0 - 16.0 g/dL    Hematocrit 26.8 (L) 37.0 - 48.5 %    MCV 94 82 - 98 fL    MCH 29.9 27.0 - 31.0 pg    MCHC 31.7 (L) 32.0 - 36.0 g/dL    RDW 15.5 (H) 11.5 - 14.5 %    Platelets 250 150 - 350 K/uL    MPV 9.8 9.2 - 12.9 fL    Gran # 3.9 1.8 - 7.7 K/uL    Lymph # 0.9 (L) 1.0 - 4.8 K/uL    Mono # 0.4 0.3 - 1.0 K/uL    Eos # 0.4 0.0 - 0.5 K/uL    Baso # 0.04 0.00 - 0.20 K/uL    Gran% 69.4 38.0 - 73.0 %    Lymph% 15.7 (L) 18.0 - 48.0 %    Mono% 7.0 4.0 - 15.0 %    Eosinophil% 7.0 0.0 - 8.0 %    Basophil% 0.7 0.0 - 1.9 %    Differential Method Automated    Comprehensive metabolic panel    Collection Time: 08/02/17 11:20 AM   Result Value Ref Range    Sodium 138 136 - 145 mmol/L    Potassium 4.8 3.5 - 5.1 mmol/L    Chloride 106 95 -  110 mmol/L    CO2 18 (L) 23 - 29 mmol/L    Glucose 142 (H) 70 - 110 mg/dL    BUN, Bld 85 (H) 6 - 20 mg/dL    Creatinine 2.8 (H) 0.5 - 1.4 mg/dL    Calcium 9.8 8.7 - 10.5 mg/dL    Total Protein 7.3 6.0 - 8.4 g/dL    Albumin 3.5 3.5 - 5.2 g/dL    Total Bilirubin 0.3 0.1 - 1.0 mg/dL    Alkaline Phosphatase 107 55 - 135 U/L    AST 14 10 - 40 U/L    ALT 11 10 - 44 U/L    Anion Gap 14 8 - 16 mmol/L    eGFR if African American 20.7 (A) >60 mL/min/1.73 m^2    eGFR if non  17.9 (A) >60 mL/min/1.73 m^2   Magnesium    Collection Time: 08/02/17 11:20 AM   Result Value Ref Range    Magnesium 2.3 1.6 - 2.6 mg/dL   Protime-INR    Collection Time: 08/02/17 11:20 AM   Result Value Ref Range    Prothrombin Time 10.2 9.0 - 12.5 sec    INR 0.9 0.8 - 1.2   ISTAT PROCEDURE    Collection Time: 08/02/17 11:31 AM   Result Value Ref Range    POC Glucose 145 (H) 70 - 110 mg/dL    POC BUN 81 (H) 6 - 30 mg/dL    POC Creatinine 2.7 (H) 0.5 - 1.4 mg/dL    POC Sodium 138 136 - 145 mmol/L    POC Potassium 4.7 3.5 - 5.1 mmol/L    POC Chloride 109 95 - 110 mmol/L    POC TCO2 (MEASURED) 22 (L) 23 - 29 mmol/L    POC Ionized Calcium 1.16 1.06 - 1.42 mmol/L    POC Hematocrit 26 (L) 36 - 54 %PCV    Sample LETICIA        Diagnostic Results:  Imaging Results    None           ASSESSMENT/PLAN:     Ms. Perez is a 56 y.o. female with a complex PMHx including CKD Stage III; prior stroke with residual left-sided hemiparesis, seizures, and aphasia; insulin-dependent DM-2; sarcoidosis; hyperlipidemia; and HTN; who presents to the ED for outside labs concerning for DARLING; Repeat labs drawn on arrival to ED demonstrated normalized potassium at 4.8 and persistent creatinine elevation at 2.8; now receiving NS IV rehydration, and awaiting UA.      Plan:    1. Acute on chronic renal failure: Pt with creatinine significantly elevated from baseline, BUN/Cr ratio suggestive of pre-renal etiology. No signs of volume overload. Awaiting results of UA (pending);  will hold lasix and reassess renal function s/p 1L NS bolus. Plan to continue IV rehydration as necessary, with continual monitoring of renal function, electrolytes, and volume status. Consider renal US if Pt does not show improvement with the above measures.     2. Prior stroke: Continue home statin and aspirin regimen     3. Epilepsy (partial complex): Continue Keppra 750 mg b.i.d., phenytoin 300 mg q.h.s.     4. Heart failure with preserved ejection fraction: Continue labetalol, monitor volume status, careful observation of ins/outs    5. Insulin-dependent DM-2: Pt was on levemir at 14U q.h.s. at home; will plan decrease to 10U q.h.s. as well as SSI regimen. POCT glucose with adjustments to maintain target BGL around 140.     6. HTN: Continue amlodipine, labetalol, hydralazine regimen    7. Anxiety and depression: Continue fluoxetine 20 mg q.d.

## 2017-08-03 LAB
ALBUMIN SERPL BCP-MCNC: 3.2 G/DL
ALP SERPL-CCNC: 102 U/L
ALT SERPL W/O P-5'-P-CCNC: 9 U/L
ANION GAP SERPL CALC-SCNC: 9 MMOL/L
AST SERPL-CCNC: 10 U/L
BASOPHILS # BLD AUTO: 0.03 K/UL
BASOPHILS NFR BLD: 0.5 %
BILIRUB SERPL-MCNC: 0.2 MG/DL
BUN SERPL-MCNC: 80 MG/DL
CALCIUM SERPL-MCNC: 9.1 MG/DL
CHLORIDE SERPL-SCNC: 108 MMOL/L
CO2 SERPL-SCNC: 21 MMOL/L
CREAT SERPL-MCNC: 2.4 MG/DL
DIFFERENTIAL METHOD: ABNORMAL
EOSINOPHIL # BLD AUTO: 0.4 K/UL
EOSINOPHIL NFR BLD: 6.4 %
ERYTHROCYTE [DISTWIDTH] IN BLOOD BY AUTOMATED COUNT: 15.2 %
EST. GFR  (AFRICAN AMERICAN): 24.9 ML/MIN/1.73 M^2
EST. GFR  (NON AFRICAN AMERICAN): 21.6 ML/MIN/1.73 M^2
GLUCOSE SERPL-MCNC: 122 MG/DL
HCT VFR BLD AUTO: 26.1 %
HGB BLD-MCNC: 8.4 G/DL
INR PPP: 0.9
LEVETIRACETAM SERPL-MCNC: 47.7 UG/ML (ref 3–60)
LYMPHOCYTES # BLD AUTO: 1.1 K/UL
LYMPHOCYTES NFR BLD: 20 %
MAGNESIUM SERPL-MCNC: 2.2 MG/DL
MCH RBC QN AUTO: 30.2 PG
MCHC RBC AUTO-ENTMCNC: 32.2 G/DL
MCV RBC AUTO: 94 FL
MONOCYTES # BLD AUTO: 0.5 K/UL
MONOCYTES NFR BLD: 9.3 %
NEUTROPHILS # BLD AUTO: 3.5 K/UL
NEUTROPHILS NFR BLD: 63.4 %
PHOSPHATE SERPL-MCNC: 3.8 MG/DL
PLATELET # BLD AUTO: 249 K/UL
PMV BLD AUTO: 10.5 FL
POCT GLUCOSE: 90 MG/DL (ref 70–110)
POTASSIUM SERPL-SCNC: 4.3 MMOL/L
PROT SERPL-MCNC: 6.6 G/DL
PROTHROMBIN TIME: 9.7 SEC
RBC # BLD AUTO: 2.78 M/UL
SODIUM SERPL-SCNC: 138 MMOL/L
WBC # BLD AUTO: 5.49 K/UL

## 2017-08-03 PROCEDURE — 11000001 HC ACUTE MED/SURG PRIVATE ROOM

## 2017-08-03 PROCEDURE — 84100 ASSAY OF PHOSPHORUS: CPT

## 2017-08-03 PROCEDURE — 80053 COMPREHEN METABOLIC PANEL: CPT

## 2017-08-03 PROCEDURE — 97112 NEUROMUSCULAR REEDUCATION: CPT

## 2017-08-03 PROCEDURE — 85025 COMPLETE CBC W/AUTO DIFF WBC: CPT

## 2017-08-03 PROCEDURE — 97161 PT EVAL LOW COMPLEX 20 MIN: CPT

## 2017-08-03 PROCEDURE — 36415 COLL VENOUS BLD VENIPUNCTURE: CPT

## 2017-08-03 PROCEDURE — 25000003 PHARM REV CODE 250: Performed by: STUDENT IN AN ORGANIZED HEALTH CARE EDUCATION/TRAINING PROGRAM

## 2017-08-03 PROCEDURE — G8979 MOBILITY GOAL STATUS: HCPCS | Mod: CM

## 2017-08-03 PROCEDURE — 99232 SBSQ HOSP IP/OBS MODERATE 35: CPT | Mod: GC,,, | Performed by: INTERNAL MEDICINE

## 2017-08-03 PROCEDURE — 85610 PROTHROMBIN TIME: CPT

## 2017-08-03 PROCEDURE — 63600175 PHARM REV CODE 636 W HCPCS: Performed by: STUDENT IN AN ORGANIZED HEALTH CARE EDUCATION/TRAINING PROGRAM

## 2017-08-03 PROCEDURE — 97166 OT EVAL MOD COMPLEX 45 MIN: CPT

## 2017-08-03 PROCEDURE — G8978 MOBILITY CURRENT STATUS: HCPCS | Mod: CM

## 2017-08-03 PROCEDURE — 83735 ASSAY OF MAGNESIUM: CPT

## 2017-08-03 RX ORDER — SODIUM CHLORIDE 9 MG/ML
INJECTION, SOLUTION INTRAVENOUS CONTINUOUS
Status: ACTIVE | OUTPATIENT
Start: 2017-08-03 | End: 2017-08-03

## 2017-08-03 RX ORDER — DANTROLENE SODIUM 25 MG/1
25 CAPSULE ORAL
COMMUNITY
End: 2017-08-10

## 2017-08-03 RX ORDER — LABETALOL 100 MG/1
500 TABLET, FILM COATED ORAL EVERY 8 HOURS
COMMUNITY
End: 2017-10-25 | Stop reason: SDUPTHER

## 2017-08-03 RX ADMIN — HYDRALAZINE HYDROCHLORIDE 100 MG: 50 TABLET ORAL at 01:08

## 2017-08-03 RX ADMIN — ATORVASTATIN CALCIUM 40 MG: 20 TABLET, FILM COATED ORAL at 09:08

## 2017-08-03 RX ADMIN — FLUOXETINE 20 MG: 20 CAPSULE ORAL at 09:08

## 2017-08-03 RX ADMIN — ASPIRIN 81 MG: 81 TABLET, COATED ORAL at 09:08

## 2017-08-03 RX ADMIN — PHENYTOIN SODIUM 300 MG: 100 CAPSULE ORAL at 10:08

## 2017-08-03 RX ADMIN — ALPRAZOLAM 0.5 MG: 0.5 TABLET ORAL at 01:08

## 2017-08-03 RX ADMIN — HEPARIN SODIUM 5000 UNITS: 5000 INJECTION, SOLUTION INTRAVENOUS; SUBCUTANEOUS at 01:08

## 2017-08-03 RX ADMIN — STANDARDIZED SENNA CONCENTRATE AND DOCUSATE SODIUM 1 TABLET: 8.6; 5 TABLET, FILM COATED ORAL at 09:08

## 2017-08-03 RX ADMIN — LEVETIRACETAM 750 MG: 750 TABLET ORAL at 10:08

## 2017-08-03 RX ADMIN — GABAPENTIN 300 MG: 300 CAPSULE ORAL at 10:08

## 2017-08-03 RX ADMIN — LABETALOL HCL 500 MG: 200 TABLET, FILM COATED ORAL at 01:08

## 2017-08-03 RX ADMIN — LEVETIRACETAM 750 MG: 750 TABLET ORAL at 09:08

## 2017-08-03 RX ADMIN — LABETALOL HCL 500 MG: 200 TABLET, FILM COATED ORAL at 10:08

## 2017-08-03 RX ADMIN — HEPARIN SODIUM 5000 UNITS: 5000 INJECTION, SOLUTION INTRAVENOUS; SUBCUTANEOUS at 05:08

## 2017-08-03 RX ADMIN — HYDRALAZINE HYDROCHLORIDE 100 MG: 50 TABLET ORAL at 10:08

## 2017-08-03 RX ADMIN — LABETALOL HCL 500 MG: 200 TABLET, FILM COATED ORAL at 05:08

## 2017-08-03 RX ADMIN — AMLODIPINE BESYLATE 10 MG: 10 TABLET ORAL at 09:08

## 2017-08-03 RX ADMIN — HYDRALAZINE HYDROCHLORIDE 100 MG: 50 TABLET ORAL at 05:08

## 2017-08-03 RX ADMIN — SODIUM CHLORIDE: 0.9 INJECTION, SOLUTION INTRAVENOUS at 12:08

## 2017-08-03 RX ADMIN — HEPARIN SODIUM 5000 UNITS: 5000 INJECTION, SOLUTION INTRAVENOUS; SUBCUTANEOUS at 10:08

## 2017-08-03 NOTE — PT/OT/SLP EVAL
Occupational Therapy  Evaluation    Lucy Perez   MRN: [id number]   Admitting Diagnosis: Acute kidney injury (nontraumatic)    OT Date of Treatment: 08/03/17   OT Start Time: 0902  OT Stop Time: 0931  OT Total Time (min): 29 min    Billable Minutes:  Evaluation 15  Neuromuscular Re-education 14    Diagnosis: Acute kidney injury (nontraumatic)   (L) hemiplegia (CVA 2016); AMS    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    Cortical cataract of both eyes 7/26/2016    Degeneration of lumbar or lumbosacral intervertebral disc 3/5/2013    S/p MRI L-spine 5/2009     Depression     General anesthetics causing adverse effect in therapeutic use     Hemorrhagic cerebrovascular accident (CVA)     8/2016 s/p Hemicraniotomy at [facility] [place] with Left hemiparesis    History of stroke 6/28/2017    s/p R-MCA stroke with R-putaminal hemorrhagic transformation in 8/2016 and 11/2016 (s/p hemicraniotomy at [facility] [place]) with residual L hemiparesis, on AED s/p CVA      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    Hyperlipidemia     Hypertensive retinopathy of both eyes 7/26/2016    Impingement syndrome of right shoulder 7/2/2014    Left ventricular diastolic dysfunction with preserved systolic function 12/15/2015    Mixed anxiety and depressive disorder 3/5/2013    Obesity     THERESA (obstructive sleep apnea) 3/5/2013    No Home CPAP 2ndary to cost     Partial symptomatic epilepsy with complex partial seizures, not intractable, without status epilepticus     PEG (percutaneous endoscopic gastrostomy) status 6/28/2017    Renovascular hypertension 3/5/2013    Will resume home medications: amlodipine, labetalol, and hydralazine Will hold  Lisinopril in setting of DARLING.   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, without long-term current use of insulin 8/2/2017    Type 2 diabetes mellitus with diabetic polyneuropathy, without long-term current use of insulin 10/22/2015    Type 2 diabetes mellitus with stage 3 chronic kidney disease, without long-term current use of insulin 10/22/2015    Vertebral artery stenosis 3/5/2013    S/p Stenting per Dr Burnett       Past Surgical History:   Procedure Laterality Date    BREAST SURGERY      breast reduction    COLONOSCOPY N/A 8/11/2016    Procedure: COLONOSCOPY;  Surgeon: Jerry Vilchis MD;  Location: [facility] ([address] [place] [address]);  Service: Endoscopy;  Laterality: N/A;  Patient reports difficulty awaking from anesthesia in the past.    HYSTERECTOMY      ROTATOR CUFF REPAIR Right July 9, 2014    right side    stent placed      in vertebral artery    TUBAL LIGATION         Referring physician: Boris  Date referred to OT: 8/2/17    General Precautions: Standard, NPO, fall, seizure  Orthopedic Precautions:    Braces:            Patient History:  Living Environment  Lives With: spouse  Living Arrangements: house  Home Accessibility: stairs to enter home  Number of Stairs to Enter Home: 8  Stair Railings at Home: outside, present at both sides  Transportation Available: family or friend will provide  Living Environment Comment: Per previous chart entry, pt lives in a raised home with a lift to enter. Pt required assistance with all ADLs and has a shaunna lift for t/fs and a roll-in shower. Son assists with bsc t/fs.  Equipment Currently Used at Home: wheelchair, hospital bed, lift device    Prior level of  function:   Bed Mobility/Transfers: needs device and assist  Grooming: needs assist  Bathing: needs device and assist  Upper Body Dressing: needs assist  Lower Body Dressing: needs assist  Toileting: unable to perform  Home Management Skills: unable to perform        Dominant hand: right    Subjective:  Communicated with RN prior to session.  Chief Complaint: Confusion  Patient/Family stated goals: None stated    Pain/Comfort  Pain Rating 1: 10/10  Location - Side 1: Left  Location 1: flank    Objective:  Patient found with: telemetry (PEG tube)    Cognitive Exam:  Oriented to: Person and Situation  Follows Commands/attention: Follows two-step commands  Communication: expressive aphasia  Memory:  Impaired LTM and Poor immediate recall  Safety awareness/insight to disability: impaired  Coping skills/emotional control: Appropriate to situation    Visual/perceptual:  Intact    Physical Exam:  Postural examination/scapula alignment: Rounded shoulder  Skin integrity: Visible skin intact  Edema: None noted     Upper Extremity Range of Motion:  Right Upper Extremity: WFL  Left Upper Extremity: Deficits: Min-Mod hypertonicity present in all joints but able to be moved passively to functional ranges. Digits in 75% flexion at rest.    Upper Extremity Strength:  Right Upper Extremity: WFL  Left Upper Extremity: No active movement.   Strength: N/A on left; WFL on right    Fine motor coordination:   Impaired  Left hand thumb/finger opposition skills impaired and Left hand, manipulation of objects impaired    Gross motor coordination: Impaired on left.    Functional Mobility:  Bed Mobility:  Rolling/Turning to Left: Total assistance  Scooting/Bridging: Total Assistance  Supine to Sit: With assist of 2, Total Assistance  Sit to Supine: Total Assistance, With assist of 2    Transfers:  Sit <> Stand Assistance: Activity did not occur, Total Assistance    Functional Ambulation: Did not occur - unsafe due to left  "hemiplegia    Activities of Daily Living:     Grooming Position: EOB  Grooming Level of Assistance: Contact guard assistance (Oral care with swab)        Balance:   Static Sit: 0: Needs MAXIMAL assist to maintain sitting without back support  Dynamic Sit: 0: N/A    Therapeutic Activities and Exercises:  UE ROM/MMT  Bed mobility training / assessment  Sitting balance assessment  - pt sat EOB ~ 8 minutes with Max A due to left posterior lean with poor ability to self-correct.  Discussed OT POC / Post-acute plan    AM-PAC 6 CLICK ADL  How much help from another person does this patient currently need?  1 = Unable, Total/Dependent Assistance  2 = A lot, Maximum/Moderate Assistance  3 = A little, Minimum/Contact Guard/Supervision  4 = None, Modified [place]/Independent    Putting on and taking off regular lower body clothing? : 1  Bathing (including washing, rinsing, drying)?: 1  Toileting, which includes using toilet, bedpan, or urinal? : 1  Putting on and taking off regular upper body clothing?: 2  Taking care of personal grooming such as brushing teeth?: 3  Eating meals?: 3  Total Score: 11    AM-PAC Raw Score CMS "G-Code Modifier Level of Impairment Assistance   6 % Total / Unable   7 - 9 CM 80 - 100% Maximal Assist   10-14 CL 60 - 80% Moderate Assist   15 - 19 CK 40 - 60% Moderate Assist   20 - 22 CJ 20 - 40% Minimal Assist   23 CI 1-20% SBA / CGA   24 CH 0% Independent/ Mod I       Patient left supine with all lines intact, call button in reach and LUE supported on wedge.    Assessment:  Lucy Perez is a 56 y.o. female with a medical diagnosis of Acute kidney injury (nontraumatic) and presents with L-sided hemiplegia from old CVA. Pt is a questionable historian but previous chart entries indicate that pt was not ambulating and required assistance with all ADLs and t/fs. Will continue following pt to avoid further functional loss and determine if any family training is needed. Pt would be " appropriate for SNF vs HH depending on level of family assistance and an accurate determination of pt's baseline.    Pt evaluation falls under moderate complexity for evaluation coding due to identification of 3-5 performance deficits noted as stated above. Eval required Min/Mod assistance to complete on this date and detailed assessment(s) were utilized. Moreover, an expanded review of history and occupational profile obtained with additional review of cognitive, physical and psychosocial hx.     Rehab identified problem list/impairments: Rehab identified problem list/impairments: weakness, impaired endurance, impaired self care skills, gait instability, impaired functional mobilty, impaired balance, impaired cognition, decreased safety awareness, decreased upper extremity function, decreased lower extremity function, decreased coordination, impaired coordination, abnormal tone, impaired muscle length, impaired joint extensibility, impaired fine motor, decreased ROM, pain    Rehab potential is fair.    Activity tolerance: Poor    Discharge recommendations: Discharge Facility/Level Of Care Needs: nursing facility, skilled, home health OT (Depending on family assistance and accurate determination of pt's baseline.)     Barriers to discharge: Barriers to Discharge: Decreased caregiver support    Equipment recommendations: none     GOALS:    Occupational Therapy Goals        Problem: Occupational Therapy Goal    Goal Priority Disciplines Outcome Interventions   Occupational Therapy Goal     OT, PT/OT Ongoing (interventions implemented as appropriate)    Description:  Goals to be met by: 8/10/17    Patient will increase functional independence with ADLs by performing:    UE Dressing with Maximum Assistance.  LE Dressing (from supine if necessary) with Maximum Assistance.  Grooming while EOB with Stand-by Assistance.  Sitting at edge of bed x10 minutes with Moderate Assistance.  Rolling to Bilateral with Moderate  Assistance.   Supine to sit with Maximum Assistance.                      PLAN:  Patient to be seen 3 x/week to address the above listed problems via self-care/home management, therapeutic activities, neuromuscular re-education, therapeutic exercises  Plan of Care expires: 09/02/17  Plan of Care reviewed with: patient         CARLYLE Waterman  08/03/2017

## 2017-08-03 NOTE — SUBJECTIVE & OBJECTIVE
Interval History: NAEON. Patient has done well today, without new complaint.  is at bedside and states that patient is comfortable and at baseline.     Review of Systems   Reason unable to perform ROS: ROS gathered from  at bedside as patient has some degree of receptive and expressive aphasia (currently at baseline per )   Constitutional: Negative for activity change and appetite change.   HENT: Negative for congestion and facial swelling.    Eyes: Negative for discharge and itching.   Respiratory: Positive for cough. Negative for apnea, chest tightness and shortness of breath.    Cardiovascular: Negative for chest pain and palpitations.   Gastrointestinal: Negative for abdominal distention, anal bleeding and blood in stool.   Endocrine: Negative for cold intolerance and heat intolerance.   Genitourinary: Negative for difficulty urinating and dysuria.   Skin: Negative for color change and pallor.   Neurological: Negative for dizziness and headaches.   Hematological: Negative for adenopathy. Does not bruise/bleed easily.   Psychiatric/Behavioral: Negative for agitation.     Objective:     Vital Signs (Most Recent):  Temp: 98.2 °F (36.8 °C) (08/03/17 1100)  Pulse: 69 (08/03/17 1100)  Resp: 16 (08/03/17 1100)  BP: (!) 159/92 (08/03/17 1100)  SpO2: 96 % (08/03/17 1100) Vital Signs (24h Range):  Temp:  [97.3 °F (36.3 °C)-98.2 °F (36.8 °C)] 98.2 °F (36.8 °C)  Pulse:  [63-77] 69  Resp:  [16-18] 16  SpO2:  [96 %-97 %] 96 %  BP: (122-159)/(62-98) 159/92     Weight: 75.5 kg (166 lb 7.2 oz)  Body mass index is 29.48 kg/m².    Intake/Output Summary (Last 24 hours) at 08/03/17 1522  Last data filed at 08/02/17 2300   Gross per 24 hour   Intake              637 ml   Output                0 ml   Net              637 ml      Physical Exam   Constitutional: She appears well-developed and well-nourished. She appears lethargic.   HENT:   Head: Normocephalic and atraumatic.   Eyes: Conjunctivae, EOM and lids are  normal.   Neck: Phonation normal. No edema present.   Cardiovascular: Normal rate, regular rhythm and normal heart sounds.    Pulmonary/Chest: Effort normal and breath sounds normal.   Coarse breath sounds heard throughout, cough present which  states is new   Abdominal: Soft. Normal appearance and bowel sounds are normal. There is no hepatosplenomegaly.   Neurological: She appears lethargic.   Easily arousable, but does not respond to questioning due to expressive and perhaps receptive aphasia. Currently at baseline per  who was present for examination.    Skin: Skin is warm, dry and intact.   Psychiatric: She has a normal mood and affect. Her speech is normal and behavior is normal.     Significant Labs:   Recent Results (from the past 24 hour(s))   POCT glucose    Collection Time: 08/02/17  6:19 PM   Result Value Ref Range    POCT Glucose 131 (H) 70 - 110 mg/dL   POCT glucose    Collection Time: 08/02/17  9:48 PM   Result Value Ref Range    POCT Glucose 141 (H) 70 - 110 mg/dL   Magnesium    Collection Time: 08/03/17  3:59 AM   Result Value Ref Range    Magnesium 2.2 1.6 - 2.6 mg/dL   Phosphorus    Collection Time: 08/03/17  3:59 AM   Result Value Ref Range    Phosphorus 3.8 2.7 - 4.5 mg/dL   Comprehensive Metabolic Panel (CMP)    Collection Time: 08/03/17  3:59 AM   Result Value Ref Range    Sodium 138 136 - 145 mmol/L    Potassium 4.3 3.5 - 5.1 mmol/L    Chloride 108 95 - 110 mmol/L    CO2 21 (L) 23 - 29 mmol/L    Glucose 122 (H) 70 - 110 mg/dL    BUN, Bld 80 (H) 6 - 20 mg/dL    Creatinine 2.4 (H) 0.5 - 1.4 mg/dL    Calcium 9.1 8.7 - 10.5 mg/dL    Total Protein 6.6 6.0 - 8.4 g/dL    Albumin 3.2 (L) 3.5 - 5.2 g/dL    Total Bilirubin 0.2 0.1 - 1.0 mg/dL    Alkaline Phosphatase 102 55 - 135 U/L    AST 10 10 - 40 U/L    ALT 9 (L) 10 - 44 U/L    Anion Gap 9 8 - 16 mmol/L    eGFR if African American 24.9 (A) >60 mL/min/1.73 m^2    eGFR if non  21.6 (A) >60 mL/min/1.73 m^2   CBC with  Automated Differential    Collection Time: 08/03/17  3:59 AM   Result Value Ref Range    WBC 5.49 3.90 - 12.70 K/uL    RBC 2.78 (L) 4.00 - 5.40 M/uL    Hemoglobin 8.4 (L) 12.0 - 16.0 g/dL    Hematocrit 26.1 (L) 37.0 - 48.5 %    MCV 94 82 - 98 fL    MCH 30.2 27.0 - 31.0 pg    MCHC 32.2 32.0 - 36.0 g/dL    RDW 15.2 (H) 11.5 - 14.5 %    Platelets 249 150 - 350 K/uL    MPV 10.5 9.2 - 12.9 fL    Gran # 3.5 1.8 - 7.7 K/uL    Lymph # 1.1 1.0 - 4.8 K/uL    Mono # 0.5 0.3 - 1.0 K/uL    Eos # 0.4 0.0 - 0.5 K/uL    Baso # 0.03 0.00 - 0.20 K/uL    Gran% 63.4 38.0 - 73.0 %    Lymph% 20.0 18.0 - 48.0 %    Mono% 9.3 4.0 - 15.0 %    Eosinophil% 6.4 0.0 - 8.0 %    Basophil% 0.5 0.0 - 1.9 %    Differential Method Automated    PT/INR    Collection Time: 08/03/17  3:59 AM   Result Value Ref Range    Prothrombin Time 9.7 9.0 - 12.5 sec    INR 0.9 0.8 - 1.2     Significant Imaging:  No new imaging or procedures to review since prior assessment

## 2017-08-03 NOTE — HOSPITAL COURSE
Lucy Perez presented with dehydration found to have an DARLING with a creatinine of 2.8 on admission. On review patient was taking lasix, HCTZ, and aldactone as well as receiving suboptimal hydration through PEG Tube. After rehydration patients creatinine continue to trend down, she was stable and at baseline on the day of discharge with improved renal function. Her lasix will be stopped moving forward and her  was educated on giving the patient appropriate hydration at home. She is receiving home health orders and will f/u in 1 week with PCP.

## 2017-08-03 NOTE — MEDICAL/APP STUDENT
Progress Note    Admit Date: 8/2/2017   LOS: 1 day     SUBJECTIVE:     Follow-up For:  DARLING    Ms. Perez is a 56 y.o. female with a complex PMHx including CKD Stage III; prior stroke with residual left-sided hemiparesis, aphasia, and seizures; insulin-dependent DM-2; sarcoidosis; hyperlipidemia; and HTN; who presented to the ED on advice of her PCP for lab results concerning for DARLING, including creatinine of 2.7 and potassium of 5.8, drawn by home health nurse on Monday 7/31. Currently Pt's renal function is improving s/p IVFs, with creatinine now at 2.4, BUN of 80, and electrolytes wnl.    KELLY overnight. Pt reports doing better this morning, though reports that she had one loose BM and wasn't aware of it occuring. She states she urinated once and denies any dysuria or hematuria.     Scheduled Meds:   amlodipine  10 mg Oral Daily    aspirin  81 mg Oral Daily    atorvastatin  40 mg Oral Daily    fluoxetine  20 mg Oral Daily    gabapentin  300 mg Oral QHS    heparin (porcine)  5,000 Units Subcutaneous Q8H    hydrALAZINE  100 mg Oral TID    insulin detemir  10 Units Subcutaneous QHS    labetalol  500 mg Per G Tube Q8H    levetiracetam  750 mg Oral BID    phenytoin  300 mg Oral QHS    senna-docusate 8.6-50 mg  1 tablet Oral BID     Continuous Infusions:   PRN Meds:albuterol sulfate, alprazolam, dextrose 50%, dextrose 50%, glucagon (human recombinant), glucose, glucose, insulin aspart    Review of patient's allergies indicates:   Allergen Reactions    Lisinopril Other (See Comments)     Angioedema      Vicodin [hydrocodone-acetaminophen] Rash       Review of Systems  Constitutional: Negative for fever, chills, or decreased activity level  HEENT: Negative for nasal congestion, negative for sore throat  Cardiovascular: Negative for chest pain or palpitations  Pulmonary: Negative for shortness of breath, positive for cough  Gastrointestinal: Negative for abdominal pain, N/V, diarrhea, or  constipation  Genitourinary: Negative for dysuria or hematuria  Endocrine: Negative for heat intolerance, negative for cold intolerance  Neurological: Negative for headache, negative for dizziness, positive for weakness, positive for seizures  Musculoskeletal: Positive for LUE and LLE pain  Hematologicall: Negative for increased bleeding  Psychiatric: Negative for confusion    OBJECTIVE:     Vital Signs (Most Recent)  Temp: 98.2 °F (36.8 °C) (08/03/17 0320)  Pulse: 74 (08/03/17 0320)  Resp: 17 (08/03/17 0320)  BP: (!) 142/98 (08/03/17 0320)  SpO2: 96 % (08/03/17 0320)    Vital Signs Range (Last 24H):  Temp:  [97.3 °F (36.3 °C)-98.4 °F (36.9 °C)]   Pulse:  [62-77]   Resp:  [16-18]   BP: (122-169)/(62-98)   SpO2:  [96 %-98 %]     I & O (Last 24H):  Intake/Output Summary (Last 24 hours) at 08/03/17 0728  Last data filed at 08/02/17 2300   Gross per 24 hour   Intake              637 ml   Output                0 ml   Net              637 ml     Physical Exam:  General: AAOx3, appears comfortable, lying in hospital bed with wedge placed, breathing room air  HENT:  Head: Normocephalic and atraumatic  Eyes: Pupils equal, round, and reactive to light; conjunctivae normal; left-beating nystagmus on horizontal gaze  Nose: Nares appear normal  Oropharynx: Moist mucous membranes  Cardiovascular: Normal rate, regular rhythm, normal S1/S2, no JVD, distal pulses intact  Respiratory: No accessory muscle use, RR normal, scattered ronchi; coarse breath sounds   Abdominal: Soft, nontender, normal bowel sounds present, PEG tube in place  Neurological:  CN 1-12: Lt-beating nystagmus, decreased hearing Rt ear, CN 1-12 otherwise grossly intact; LUE 0/5 strength, spasticity to LUE, DTR 2+ on RUE, DTR 3+ on LUE, diffuse wasting of LE, Rt>Lt  Musculoskeletal: Lt knee and thigh pain elicited on flexion and extension of the limb, TTP of Lt knee  Psychiatric: AAOx3, cooperative with exam, speech slowed and delayed     Laboratory:  Recent Results  (from the past 24 hour(s))   CBC auto differential    Collection Time: 08/02/17 11:20 AM   Result Value Ref Range    WBC 5.68 3.90 - 12.70 K/uL    RBC 2.84 (L) 4.00 - 5.40 M/uL    Hemoglobin 8.5 (L) 12.0 - 16.0 g/dL    Hematocrit 26.8 (L) 37.0 - 48.5 %    MCV 94 82 - 98 fL    MCH 29.9 27.0 - 31.0 pg    MCHC 31.7 (L) 32.0 - 36.0 g/dL    RDW 15.5 (H) 11.5 - 14.5 %    Platelets 250 150 - 350 K/uL    MPV 9.8 9.2 - 12.9 fL    Gran # 3.9 1.8 - 7.7 K/uL    Lymph # 0.9 (L) 1.0 - 4.8 K/uL    Mono # 0.4 0.3 - 1.0 K/uL    Eos # 0.4 0.0 - 0.5 K/uL    Baso # 0.04 0.00 - 0.20 K/uL    Gran% 69.4 38.0 - 73.0 %    Lymph% 15.7 (L) 18.0 - 48.0 %    Mono% 7.0 4.0 - 15.0 %    Eosinophil% 7.0 0.0 - 8.0 %    Basophil% 0.7 0.0 - 1.9 %    Differential Method Automated    Comprehensive metabolic panel    Collection Time: 08/02/17 11:20 AM   Result Value Ref Range    Sodium 138 136 - 145 mmol/L    Potassium 4.8 3.5 - 5.1 mmol/L    Chloride 106 95 - 110 mmol/L    CO2 18 (L) 23 - 29 mmol/L    Glucose 142 (H) 70 - 110 mg/dL    BUN, Bld 85 (H) 6 - 20 mg/dL    Creatinine 2.8 (H) 0.5 - 1.4 mg/dL    Calcium 9.8 8.7 - 10.5 mg/dL    Total Protein 7.3 6.0 - 8.4 g/dL    Albumin 3.5 3.5 - 5.2 g/dL    Total Bilirubin 0.3 0.1 - 1.0 mg/dL    Alkaline Phosphatase 107 55 - 135 U/L    AST 14 10 - 40 U/L    ALT 11 10 - 44 U/L    Anion Gap 14 8 - 16 mmol/L    eGFR if African American 20.7 (A) >60 mL/min/1.73 m^2    eGFR if non  17.9 (A) >60 mL/min/1.73 m^2   Magnesium    Collection Time: 08/02/17 11:20 AM   Result Value Ref Range    Magnesium 2.3 1.6 - 2.6 mg/dL   Protime-INR    Collection Time: 08/02/17 11:20 AM   Result Value Ref Range    Prothrombin Time 10.2 9.0 - 12.5 sec    INR 0.9 0.8 - 1.2   ISTAT PROCEDURE    Collection Time: 08/02/17 11:31 AM   Result Value Ref Range    POC Glucose 145 (H) 70 - 110 mg/dL    POC BUN 81 (H) 6 - 30 mg/dL    POC Creatinine 2.7 (H) 0.5 - 1.4 mg/dL    POC Sodium 138 136 - 145 mmol/L    POC Potassium 4.7 3.5 -  5.1 mmol/L    POC Chloride 109 95 - 110 mmol/L    POC TCO2 (MEASURED) 22 (L) 23 - 29 mmol/L    POC Ionized Calcium 1.16 1.06 - 1.42 mmol/L    POC Hematocrit 26 (L) 36 - 54 %PCV    Sample LETICIA    POCT glucose    Collection Time: 08/02/17  6:19 PM   Result Value Ref Range    POCT Glucose 131 (H) 70 - 110 mg/dL   POCT glucose    Collection Time: 08/02/17  9:48 PM   Result Value Ref Range    POCT Glucose 141 (H) 70 - 110 mg/dL   Magnesium    Collection Time: 08/03/17  3:59 AM   Result Value Ref Range    Magnesium 2.2 1.6 - 2.6 mg/dL   Phosphorus    Collection Time: 08/03/17  3:59 AM   Result Value Ref Range    Phosphorus 3.8 2.7 - 4.5 mg/dL   Comprehensive Metabolic Panel (CMP)    Collection Time: 08/03/17  3:59 AM   Result Value Ref Range    Sodium 138 136 - 145 mmol/L    Potassium 4.3 3.5 - 5.1 mmol/L    Chloride 108 95 - 110 mmol/L    CO2 21 (L) 23 - 29 mmol/L    Glucose 122 (H) 70 - 110 mg/dL    BUN, Bld 80 (H) 6 - 20 mg/dL    Creatinine 2.4 (H) 0.5 - 1.4 mg/dL    Calcium 9.1 8.7 - 10.5 mg/dL    Total Protein 6.6 6.0 - 8.4 g/dL    Albumin 3.2 (L) 3.5 - 5.2 g/dL    Total Bilirubin 0.2 0.1 - 1.0 mg/dL    Alkaline Phosphatase 102 55 - 135 U/L    AST 10 10 - 40 U/L    ALT 9 (L) 10 - 44 U/L    Anion Gap 9 8 - 16 mmol/L    eGFR if African American 24.9 (A) >60 mL/min/1.73 m^2    eGFR if non  21.6 (A) >60 mL/min/1.73 m^2   CBC with Automated Differential    Collection Time: 08/03/17  3:59 AM   Result Value Ref Range    WBC 5.49 3.90 - 12.70 K/uL    RBC 2.78 (L) 4.00 - 5.40 M/uL    Hemoglobin 8.4 (L) 12.0 - 16.0 g/dL    Hematocrit 26.1 (L) 37.0 - 48.5 %    MCV 94 82 - 98 fL    MCH 30.2 27.0 - 31.0 pg    MCHC 32.2 32.0 - 36.0 g/dL    RDW 15.2 (H) 11.5 - 14.5 %    Platelets 249 150 - 350 K/uL    MPV 10.5 9.2 - 12.9 fL    Gran # 3.5 1.8 - 7.7 K/uL    Lymph # 1.1 1.0 - 4.8 K/uL    Mono # 0.5 0.3 - 1.0 K/uL    Eos # 0.4 0.0 - 0.5 K/uL    Baso # 0.03 0.00 - 0.20 K/uL    Gran% 63.4 38.0 - 73.0 %    Lymph% 20.0 18.0  - 48.0 %    Mono% 9.3 4.0 - 15.0 %    Eosinophil% 6.4 0.0 - 8.0 %    Basophil% 0.5 0.0 - 1.9 %    Differential Method Automated    PT/INR    Collection Time: 08/03/17  3:59 AM   Result Value Ref Range    Prothrombin Time 9.7 9.0 - 12.5 sec    INR 0.9 0.8 - 1.2       Diagnostic Results:  Imaging Results    None           ASSESSMENT/PLAN:     Ms. Perez is a 56 y.o. female with a complex PMHx including CKD Stage III; prior stroke with residual left-sided hemiparesis, aphasia, and seizures; insulin-dependent DM-2; sarcoidosis; hyperlipidemia; and HTN; who presented to the ED on advice of her PCP for lab results concerning for DARLING, including creatinine of 2.7 and potassium of 5.8, drawn by home health nurse on Monday 7/31. Currently Pt's renal function is improving s/p IVFs, with creatinine now at 2.4, BUN of 80, and electrolytes wnl.     Plan:     1. Nontraumatic DARLING on background of CKD-III: Improving renal function s/p IVFs; currently holding lasix. UA pending, though the improvement in creatinine with rehydration is consistent with the suspected pre-renal etiology. Will continue to trend BMP and electrolytes. Plan to continue IV rehydration with NS infusion at 100 cc/hr with continual monitoring of volume status.     2. Prior Stroke: Continue home regimen of atorvastatin 40 mg q.d. and daily ASA 81 mg     3. Partial Complex Epilepsy: Continue Keppra 750 b.i.d., phenytoin 300 mg q.h.s.    4. Left Ventricular Dysfunction with Preserved Systolic Function: Continue home labetalol, continue to monitor volume status and VS, careful observation of I/Os    5. Insulin-Dependent DM-2: Continue SSI with regular POCT glucose measurements; maintain target BGL around mid-100s range.     6. Cough: Pt has not been SOB since admission, but has ronchi on lung auscultation; has albuterol nebs written for q. 6 h p.r.n.     7. Hypertension: Continue amlodipine 10 mg q.d., labetalol 500 mg t.i.d., hydralazine 100 mg t.i.d.    8.  Anemia in CKD-III: Hgb 8.4, appears to be within baseline range of mid 8s - mid 9s. Will continue to follow.    9. Mixed Anxiety and Depression: Continue fluoxetine 20 mg q.d.    10. Neuropathy: Continue gabapentin 300 mg q.h.s     11. PEG Tube: Appears clean with no surrounding erythema. Continue routine management. Pt currently NPO with all meds and nutrition given via PEG.

## 2017-08-03 NOTE — PLAN OF CARE
Problem: Physical Therapy Goal  Goal: Physical Therapy Goal  Goals to be met by: 17     Patient will increase functional independence with mobility by performin. Supine to sit with Maximum Assistance  2. Sit to supine with Maximum Assistance  3. Rolling to Left and Right with Maximum Assistance.  4. Sitting at edge of bed x8 minutes with Maximum Assistance demonstrating trunk extension and little to no pushing with R UE.           PT evaluation completed. POC and goals established.    Anayeli Parisi, PT, DPT  8/3/2017

## 2017-08-03 NOTE — PROGRESS NOTES
[facility] Hospital Medicine  Progress Note    Patient Name: Lucy Perez  MRN: [id number]  Patient Class: IP- Inpatient   Admission Date: 8/2/2017  Length of Stay: 1 days  Attending Physician: Edna Waller MD  Primary Care Provider: Beverly Muniz MD    [facility] Medicine Team: [facility] HOSP MED 4 Floyd Chinchilla MD    Subjective:     Principal Problem:Acute kidney injury (nontraumatic)    HPI:  56F s/p R-MCA stroke with R-putaminal hemorrhagic transformation in 8/2016 and 11/2016 (s/p hemicraniotomy at [facility]) with residual L hemiparesis, on AED s/p CVA (keppra/tegretol). Tegretol stopped last admission, PEG status, chronic diastolic heart failure, DM2, HTN, HLD, sarcoidosis, RA, CKDIII. Preset to the ER by PCP advise for abnormal labs ''  DARLING with worsening acidosis, hyperkalemia and low UOP'' . Patient denied any new symptoms from her last admission. In the last admission she wasd treated for UTI (  ENTEROBACTER treated with cipro ) she denied any poor oral intake, diarrhea, reduce water intake per PEG per . Also denied any abdominal pain, burning urine, fever, chills, cough, chest pain, skin boils.   She was on diurtics ( HCTZ, lasix and aldactone ) plus famotidine.       Hospital Course:  Lucy Perez presented with     Interval History: NAEON. Patient has done well today, without new complaint.  is at bedside and states that patient is comfortable and at baseline.     Review of Systems   Reason unable to perform ROS: ROS gathered from  at bedside as patient has some degree of receptive and expressive aphasia (currently at baseline per )   Constitutional: Negative for activity change and appetite change.   HENT: Negative for congestion and facial swelling.    Eyes: Negative for discharge and itching.   Respiratory: Positive for cough. Negative for apnea, chest tightness and shortness of breath.    Cardiovascular: Negative for chest pain  and palpitations.   Gastrointestinal: Negative for abdominal distention, anal bleeding and blood in stool.   Endocrine: Negative for cold intolerance and heat intolerance.   Genitourinary: Negative for difficulty urinating and dysuria.   Skin: Negative for color change and pallor.   Neurological: Negative for dizziness and headaches.   Hematological: Negative for adenopathy. Does not bruise/bleed easily.   Psychiatric/Behavioral: Negative for agitation.     Objective:     Vital Signs (Most Recent):  Temp: 98.2 °F (36.8 °C) (08/03/17 1100)  Pulse: 69 (08/03/17 1100)  Resp: 16 (08/03/17 1100)  BP: (!) 159/92 (08/03/17 1100)  SpO2: 96 % (08/03/17 1100) Vital Signs (24h Range):  Temp:  [97.3 °F (36.3 °C)-98.2 °F (36.8 °C)] 98.2 °F (36.8 °C)  Pulse:  [63-77] 69  Resp:  [16-18] 16  SpO2:  [96 %-97 %] 96 %  BP: (122-159)/(62-98) 159/92     Weight: 75.5 kg (166 lb 7.2 oz)  Body mass index is 29.48 kg/m².    Intake/Output Summary (Last 24 hours) at 08/03/17 1522  Last data filed at 08/02/17 2300   Gross per 24 hour   Intake              637 ml   Output                0 ml   Net              637 ml      Physical Exam   Constitutional: She appears well-developed and well-nourished. She appears lethargic.   HENT:   Head: Normocephalic and atraumatic.   Eyes: Conjunctivae, EOM and lids are normal.   Neck: Phonation normal. No edema present.   Cardiovascular: Normal rate, regular rhythm and normal heart sounds.    Pulmonary/Chest: Effort normal and breath sounds normal.   Coarse breath sounds heard throughout, cough present which  states is new   Abdominal: Soft. Normal appearance and bowel sounds are normal. There is no hepatosplenomegaly.   Neurological: She appears lethargic.   Easily arousable, but does not respond to questioning due to expressive and perhaps receptive aphasia. Currently at baseline per  who was present for examination.    Skin: Skin is warm, dry and intact.   Psychiatric: She has a normal mood  and affect. Her speech is normal and behavior is normal.     Significant Labs:   Recent Results (from the past 24 hour(s))   POCT glucose    Collection Time: 08/02/17  6:19 PM   Result Value Ref Range    POCT Glucose 131 (H) 70 - 110 mg/dL   POCT glucose    Collection Time: 08/02/17  9:48 PM   Result Value Ref Range    POCT Glucose 141 (H) 70 - 110 mg/dL   Magnesium    Collection Time: 08/03/17  3:59 AM   Result Value Ref Range    Magnesium 2.2 1.6 - 2.6 mg/dL   Phosphorus    Collection Time: 08/03/17  3:59 AM   Result Value Ref Range    Phosphorus 3.8 2.7 - 4.5 mg/dL   Comprehensive Metabolic Panel (CMP)    Collection Time: 08/03/17  3:59 AM   Result Value Ref Range    Sodium 138 136 - 145 mmol/L    Potassium 4.3 3.5 - 5.1 mmol/L    Chloride 108 95 - 110 mmol/L    CO2 21 (L) 23 - 29 mmol/L    Glucose 122 (H) 70 - 110 mg/dL    BUN, Bld 80 (H) 6 - 20 mg/dL    Creatinine 2.4 (H) 0.5 - 1.4 mg/dL    Calcium 9.1 8.7 - 10.5 mg/dL    Total Protein 6.6 6.0 - 8.4 g/dL    Albumin 3.2 (L) 3.5 - 5.2 g/dL    Total Bilirubin 0.2 0.1 - 1.0 mg/dL    Alkaline Phosphatase 102 55 - 135 U/L    AST 10 10 - 40 U/L    ALT 9 (L) 10 - 44 U/L    Anion Gap 9 8 - 16 mmol/L    eGFR if African American 24.9 (A) >60 mL/min/1.73 m^2    eGFR if non  21.6 (A) >60 mL/min/1.73 m^2   CBC with Automated Differential    Collection Time: 08/03/17  3:59 AM   Result Value Ref Range    WBC 5.49 3.90 - 12.70 K/uL    RBC 2.78 (L) 4.00 - 5.40 M/uL    Hemoglobin 8.4 (L) 12.0 - 16.0 g/dL    Hematocrit 26.1 (L) 37.0 - 48.5 %    MCV 94 82 - 98 fL    MCH 30.2 27.0 - 31.0 pg    MCHC 32.2 32.0 - 36.0 g/dL    RDW 15.2 (H) 11.5 - 14.5 %    Platelets 249 150 - 350 K/uL    MPV 10.5 9.2 - 12.9 fL    Gran # 3.5 1.8 - 7.7 K/uL    Lymph # 1.1 1.0 - 4.8 K/uL    Mono # 0.5 0.3 - 1.0 K/uL    Eos # 0.4 0.0 - 0.5 K/uL    Baso # 0.03 0.00 - 0.20 K/uL    Gran% 63.4 38.0 - 73.0 %    Lymph% 20.0 18.0 - 48.0 %    Mono% 9.3 4.0 - 15.0 %    Eosinophil% 6.4 0.0 - 8.0 %     Basophil% 0.5 0.0 - 1.9 %    Differential Method Automated    PT/INR    Collection Time: 08/03/17  3:59 AM   Result Value Ref Range    Prothrombin Time 9.7 9.0 - 12.5 sec    INR 0.9 0.8 - 1.2     Significant Imaging:  No new imaging or procedures to review since prior assessment      Assessment/Plan:      * Acute kidney injury (nontraumatic)    Pre-renal due to inadequate fluid intake,  counselled on making sure to give patinet appropriate hydration daily  Creatinine improved today, 2.4 from 2.8  -will hold diuretics  -continue PEG free water, currently getting 100ml/hr IVNS as well  -will continue to trend renal function in the AM and discharge to home if improved        Stage 3 chronic kidney disease    - with proteinuria likely 2/2 DM/HTN      Left ventricular diastolic dysfunction with preserved systolic function    -Continue home Labetalol 500mg per gtube q8h  -Monitor I/Os       Partial symptomatic epilepsy with complex partial seizures, not intractable, without status epilepticus    -continue Keppra 750 mg BID, that is what family reported.       Anemia in stage 3 chronic kidney disease    Stable at baseline      History of stroke    - continue statin, ASA        PEG (percutaneous endoscopic gastrostomy) status    Clean and intact  -Routine care per Rn staff  -Patient has home health; will continue upon d/c        Left spastic hemiparesis    - 2/2 stroke      Type 2 diabetes mellitus with stage 3 chronic kidney disease, without long-term current use of insulin    Well controlled   -Detemir 10 units nightly and aspart before meals and nightly (not required thus far this admission); glucose 122 on today labs, meeting goal for admission  -Most recent A1C on 07/31/2017 was 5.4      THERESA (obstructive sleep apnea)    Stable, patient does not have home CPAP due to cost       Mixed anxiety and depressive disorder    -Continue home dose of fluoxetine 20 mg daily       Renovascular hypertension    -Continue  amlodipine, labetalol and hydralazine   -stopped ACE from last admission.   -Home Lasix 40mg po daily and HCTZ 25mg stopped this admission        VTE Risk Mitigation         Ordered     heparin (porcine) injection 5,000 Units  Every 8 hours     Route:  Subcutaneous        08/02/17 1354     Medium Risk of VTE  Once      08/02/17 1354     Place LUIS hose  Until discontinued      08/02/17 1354        Floyd Chinchilla MD, PGY-1  Department of Hospital Medicine   [facility]

## 2017-08-03 NOTE — PLAN OF CARE
Problem: Patient Care Overview  Goal: Plan of Care Review  Outcome: Ongoing (interventions implemented as appropriate)  POC reviewed with patient,   and daughter  at bedside, all verbalized understanding. VSS, no acute distress noted.     Problem: Fall Risk (Adult)  Goal: Absence of Falls  Patient will demonstrate the desired outcomes by discharge/transition of care.   Outcome: Ongoing (interventions implemented as appropriate)  Patient remained free of falls, trauma or injuries, low bed, side rails up X2, call light within reach, fall risk band on, non skid sock son, bed alarm on.     Problem: Pressure Ulcer Risk (Roosevelt Scale) (Adult,Obstetrics,Pediatric)  Goal: Skin Integrity  Patient will demonstrate the desired outcomes by discharge/transition of care.   Outcome: Ongoing (interventions implemented as appropriate)  Sacralrash was cleaned w/ NS, barrier cream applied and mepilex changed. Patient was turned q 2 hrs throughout  shift time and positioned wedge was used.

## 2017-08-03 NOTE — PLAN OF CARE
"CM to bedside - pt w/ limited communication; CM spoke w/ spouse via phone who provided assessment info. Pt w/ DME in place including home o2, lives w/ spouse & adult son. Pt is active w/ [facility] and wishes to resume their services at d/c.     CM provided patient anticipated FAITH which will be update by nursing staff. Patient provided a Blue "My Health Packet" for d/c planning and health tool.     Future Appointments  Date Time Provider Department Center   8/10/2017 10:00 AM Beverly Muniz MD [facility]        08/03/17 3392   Discharge Assessment   Assessment Type Discharge Planning Assessment   Confirmed/corrected address and phone number on facesheet? Yes   Assessment information obtained from? Patient;Caregiver  (spoke w/ spouse via phone)   Expected Length of Stay (days) 3   Communicated expected length of stay with patient/caregiver yes   Prior to hospitilization cognitive status: Unable to Assess   Prior to hospitalization functional status: Assistive Equipment;Needs Assistance;Partially Dependent   Current cognitive status: Not Oriented to Place;Not Oriented to Time   Current Functional Status: Assistive Equipment;Needs Assistance;Partially Dependent   Arrived From home health   Lives With child(kieran), adult;spouse   Able to Return to Prior Arrangements yes   Is patient able to care for self after discharge? No   How many people do you have in your home that can help with your care after discharge? 1   Who are your caregiver(s) and their phone number(s)? [email or web address] - Wilder [phone]   Patient's perception of discharge disposition home health   Readmission Within The Last 30 Days unable to assess  (pt was at [facility] 1 month ago)   Patient currently being followed by outpatient case management? No   Patient currently receives home health services? Yes   Patient previously received home health services and would like to resume services if necessary? Yes   If yes, name of home health provider: " [facility]   Does the patient currently use HME? Yes   Name and contact number for HME provider: unknown   Patient currently receives private duty nursing? No   Patient currently receives any other outside agency services? No   Equipment Currently Used at Home wheelchair;lift device;hospital bed;bedside commode;shower chair;oxygen;other (see comments);CPAP  (nebulizer)   Do you have any problems affording any of your prescribed medications? No   Is the patient taking medications as prescribed? yes   Do you have any financial concerns preventing you from receiving the healthcare you need? No   Does the patient have transportation to healthcare appointments? Yes   Transportation Available public transportation  (RTA lift)   On Dialysis? No   Does the patient receive services at the [facility]? No   Are there any open cases? No   Discharge Plan A Home with family;Home Health   Discharge Plan B Home with family   Patient/Family In Agreement With Plan yes

## 2017-08-03 NOTE — ASSESSMENT & PLAN NOTE
Well controlled   -Detemir 10 units nightly and aspart before meals and nightly (not required thus far this admission); glucose 122 on today labs, meeting goal for admission  -Most recent A1C on 07/31/2017 was 5.4

## 2017-08-03 NOTE — ASSESSMENT & PLAN NOTE
-Continue amlodipine, labetalol and hydralazine   -stopped ACE from last admission.   -Home Lasix 40mg po daily and HCTZ 25mg stopped this admission

## 2017-08-03 NOTE — PROGRESS NOTES
Call placed to nursing house supervisor for strawberry boost supplemental drink to be given via peg tube.

## 2017-08-03 NOTE — PT/OT/SLP EVAL
Physical Therapy  Evaluation    Lucy Perez   MRN: [id number]   Admitting Diagnosis: Acute kidney injury (nontraumatic)    PT Received On: 08/03/17  PT Start Time: 0902     PT Stop Time: 0929    PT Total Time (min): 27 min       Billable Minutes:  Evaluation 17 and Neuromuscular Re-education 10     Personal factors/comorbidities: h/o stroke; asthma; HLD; HTN; DM2. The listed co-morbidities and personal factors impact pt's current level and progress with functional mobility and independence.   Body systems elements affected: lower extremities, upper extremities, trunk, musculoskeletal system, neuromuscular system, cardiovascular, pulmonary system, integumentary system; orientation/level of consciousness  Impairments: see assessment below  Clinical Presentation: stable  Functional Outcome Tools: AMPAC, MMT, ROM  Evaluation Complexity Level: low      Diagnosis: Acute kidney injury (nontraumatic)      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    Cortical cataract of both eyes 7/26/2016    Degeneration of lumbar or lumbosacral intervertebral disc 3/5/2013    S/p MRI L-spine 5/2009     Depression     General anesthetics causing adverse effect in therapeutic use     Hemorrhagic cerebrovascular accident (CVA)     [place] [facility] [place]    History of stroke 6/28/2017    s/p R-MCA stroke with R-putaminal hemorrhagic transformation in 8/2016 and 11/2016 [place] [facility] [place] with residual L hemiparesis, on AED s/p CVA      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    Hyperlipidemia     Hypertensive retinopathy of both eyes 7/26/2016    Impingement syndrome of right shoulder  7/2/2014    Left ventricular diastolic dysfunction with preserved systolic function 12/15/2015    Mixed anxiety and depressive disorder 3/5/2013    Obesity     THERESA (obstructive sleep apnea) 3/5/2013    No Home CPAP 2ndary to cost     Partial symptomatic epilepsy with complex partial seizures, not intractable, without status epilepticus     PEG (percutaneous endoscopic gastrostomy) status 6/28/2017    Renovascular hypertension 3/5/2013    Will resume home medications: amlodipine, labetalol, and hydralazine Will hold Lisinopril in setting of DARLING.   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, without long-term current use of insulin 8/2/2017    Type 2 diabetes mellitus with diabetic polyneuropathy, without long-term current use of insulin 10/22/2015    Type 2 diabetes mellitus with stage 3 chronic kidney disease, without long-term current use of insulin 10/22/2015    Vertebral artery stenosis 3/5/2013    S/p Stenting per Dr Burnett       Past Surgical History:   Procedure Laterality Date    BREAST SURGERY      breast reduction    COLONOSCOPY N/A 8/11/2016    Procedure: COLONOSCOPY;  Surgeon: Jerry Vilchis MD;  Location: [address]);  Service: Endoscopy;  Laterality: N/A;  Patient reports difficulty awaking from anesthesia in the past.    HYSTERECTOMY      ROTATOR CUFF REPAIR Right July 9, 2014    right side    stent placed      in vertebral artery    TUBAL LIGATION         Referring physician: MARTIN Marina  Date referred to PT: 08/02/17       General Precautions: Standard, fall, seizure, NPO, aspiration  Orthopedic Precautions: N/A   Braces: N/A       Do you have any cultural,  spiritual, Buddhism conflicts, given your current situation?: none stated    Patient History:  Pt provided history/PLOF. No family present to confirm information or provide level of assistance available upon d/c.     Lives with:  and daughter  L[facility] in: [facility], no [facility]  PLOF/Level of assist: Per chart, pt is bed bound. Pt reports she uses a cane for mobility. RN comes to the house to assist with bathing. Family assists with ADLs  Bath: n/a  DME: hospital bed; SPC    Roles/responsibilities: mother, wife    Assist available upon d/c: unsure      Subjective:  Communicated with RN prior to session.  Pt agreeable to PT/OT evaluation.     Chief Complaint: L UE/LE pain/discomfort; 10/10; better after mobility  Patient goals: To go home           Objective:   Patient found with: telemetry (PEG)     Cognitive Exam:  Oriented to: Person, Place and Time    Follows Commands/attention: Follows one-step commands  Communication: dysarthria  Safety awareness/insight to disability: intact    Physical Exam:  Postural examination/scapula alignment: Rounded shoulder, Head forward, Posterior pelvic tilt, Abnormal trunk flexion and Kyphosis    Skin integrity: Visible skin intact  Edema: None noted     Sensation:   Intact    Upper Extremity Range of Motion:  Right Upper Extremity: WFL  Left Upper Extremity: Increase flexion hypertonicity throughout; difficulty to open hand with passive stretching    Upper Extremity Strength:  Right Upper Extremity: at least 2/5 grossly; see OT noted for more information  Left Upper Extremity: 0/5 demonstrated     Lower Extremity Range of Motion:  Right Lower Extremity: PROM WFL  Left Lower Extremity: PF contracture unable to reach DF neutral; PROM knee flexion WFL    Lower Extremity Strength:  Right Lower Extremity: at least 3/5 throughout  Left Lower Extremity: 0/5 demonstrated     Fine motor coordination:  Impaired    Gross motor coordination: Impaired    Functional Mobility:  Bed  Mobility:  Rolling: Maximum Assistance   Supine to Sit:  Total Assistance x2  Sit to supine: Total Assistance x2  Scooting: Total Assistance x2    Transfers:   Not appropriate due to sitting balance    Balance:  Static Sitting: Maximum Assistance uses R UE for support, but also pushes to the LEFT creating lateral lean  Dynamic Sitting: Total Assistance increase posterior lean with LE movement; forward lean with increase trunk flexion while performing oral hygiene  Static Standing: Activity did not occur   Dynamic Standing: Activity did not occur     Neuro Reeducation:  Sitting Balance (10 minutes)  Required Max/Total A for balance.   UE Support Required: R UE supported; requires assist to position L UE in weight-bearing support position  Posture: posterior pelvic tilt; kyphosis; abnormal trunk flexion  Encouraged and performed following tasks/activities: maintaining midline orientation/position       AM-PAC 6 CLICK MOBILITY  How much help from another person does this patient currently need?   1 = Unable, Total/Dependent Assistance  2 = A lot, Maximum/Moderate Assistance  3 = A little, Minimum/Contact Guard/Supervision  4 = None, Modified [place]/Independent    Turning over in bed (including adjusting bedclothes, sheets and blankets)?: 2  Sitting down on and standing up from a chair with arms (e.g., wheelchair, bedside commode, etc.): 1  Moving from lying on back to sitting on the side of the bed?: 2  Moving to and from a bed to a chair (including a wheelchair)?: 1  Need to walk in hospital room?: 1  Climbing 3-5 steps with a railing?: 1  Total Score: 8     AM-PAC Raw Score CMS G-Code Modifier Level of Impairment Assistance   6 % Total / Unable   7 - 9 CM 80 - 100% Maximal Assist   10 - 14 CL 60 - 80% Moderate Assist   15 - 19 CK 40 - 60% Moderate Assist   20 - 22 CJ 20 - 40% Minimal Assist   23 CI 1-20% SBA / CGA   24 CH 0% Independent/ Mod I     Patient left supine with all lines intact, call button  in reach and RN present.    Assessment:   Lucy Perez is a 56 y.o. female with a medical diagnosis of Acute kidney injury (nontraumatic) and presents with decrease endurance, strength, balance, coordination, and trunk control required for all mobility and daily tasks.  Pt tolerated session well with no increase in pain/discomfort.  Pt would benefit from continued skilled physical therapy for the listed impairments to improve functional independence and overall safety with mobility prior to d/c. PT recommends d/c disposition to SNF pending family's preference and ability to provide 24/7 care and assist.       Education:  Education provided to pt regarding: PT role/POC. Verbalized understanding.     Whiteboard updated with correct mobility information. RN/PCT notified.  Transfer with therapy only at this time.       .    Rehab identified problem list/impairments: Rehab identified problem list/impairments: weakness, impaired endurance, impaired self care skills, impaired functional mobilty, gait instability, impaired balance, decreased upper extremity function, decreased lower extremity function, pain, decreased safety awareness, decreased ROM, abnormal tone, impaired coordination, impaired muscle length    Rehab potential is good.    Activity tolerance: Good    Discharge recommendations: Discharge Facility/Level Of Care Needs: nursing facility, skilled (pending family assist available)     Barriers to discharge: Barriers to Discharge: Inaccessible home environment, Decreased caregiver support (pt requires increase assist at this time)    Equipment recommendations: Equipment Needed After Discharge: none (TBD; likely all DME in place)     GOALS:    Physical Therapy Goals        Problem: Physical Therapy Goal    Goal Priority Disciplines Outcome Goal Variances Interventions   Physical Therapy Goal     PT/OT, PT      Description:  Goals to be met by: 8/9/17     Patient will increase functional independence  with mobility by performin. Supine to sit with Maximum Assistance  2. Sit to supine with Maximum Assistance  3. Rolling to Left and Right with Maximum Assistance.  4. Sitting at edge of bed x8 minutes with Maximum Assistance demonstrating trunk extension and little to no pushing with R UE.                       PLAN:    Patient to be seen 5 x/week to address the above listed problems via gait training, therapeutic activities, therapeutic exercises, neuromuscular re-education  Plan of Care expires: 17  Plan of Care reviewed with: patient    Functional Assessment Tool Used: ampac  Score: 8  Functional Limitation: Mobility: Walking and moving around  Mobility: Walking and Moving Around Current Status (): ANTONELLA  Mobility: Walking and Moving Around Goal Status (): ANTONELLA Parisi, PT  2017

## 2017-08-03 NOTE — PLAN OF CARE
Problem: Occupational Therapy Goal  Goal: Occupational Therapy Goal  Goals to be met by: 8/10/17    Patient will increase functional independence with ADLs by performing:    UE Dressing with Maximum Assistance.  LE Dressing (from supine if necessary) with Maximum Assistance.  Grooming while EOB with Stand-by Assistance.  Sitting at edge of bed x10 minutes with Moderate Assistance.  Rolling to Bilateral with Moderate Assistance.   Supine to sit with Maximum Assistance.    Outcome: Ongoing (interventions implemented as appropriate)  Evaluation completed and POC established.    CARLYLE Jiménez

## 2017-08-03 NOTE — PLAN OF CARE
Problem: Patient Care Overview  Goal: Plan of Care Review  Outcome: Ongoing (interventions implemented as appropriate)  Patient awake, alert, and oriented. Patient's vitals remain stable. Patient remains free from falls/injury throughout shift. Patient kept NPO per MD orders; all meds and feedings given via peg tube. Neuro checks done Q4hrs per MD orders. No complaints of pain this shift. Will continue to monitor.

## 2017-08-04 VITALS
HEIGHT: 63 IN | TEMPERATURE: 98 F | RESPIRATION RATE: 17 BRPM | SYSTOLIC BLOOD PRESSURE: 168 MMHG | WEIGHT: 166.44 LBS | OXYGEN SATURATION: 99 % | DIASTOLIC BLOOD PRESSURE: 72 MMHG | HEART RATE: 62 BPM | BODY MASS INDEX: 29.49 KG/M2

## 2017-08-04 PROBLEM — N17.9 ACUTE KIDNEY INJURY (NONTRAUMATIC): Status: RESOLVED | Noted: 2017-08-02 | Resolved: 2017-08-04

## 2017-08-04 LAB
ALBUMIN SERPL BCP-MCNC: 3.1 G/DL
ALP SERPL-CCNC: 97 U/L
ALT SERPL W/O P-5'-P-CCNC: 9 U/L
ANION GAP SERPL CALC-SCNC: 10 MMOL/L
ANISOCYTOSIS BLD QL SMEAR: SLIGHT
AST SERPL-CCNC: 12 U/L
BASOPHILS # BLD AUTO: 0.03 K/UL
BASOPHILS NFR BLD: 0.6 %
BILIRUB SERPL-MCNC: 0.2 MG/DL
BUN SERPL-MCNC: 66 MG/DL
CALCIUM SERPL-MCNC: 9.2 MG/DL
CHLORIDE SERPL-SCNC: 108 MMOL/L
CO2 SERPL-SCNC: 19 MMOL/L
CREAT SERPL-MCNC: 1.9 MG/DL
DIFFERENTIAL METHOD: ABNORMAL
EOSINOPHIL # BLD AUTO: 0.3 K/UL
EOSINOPHIL NFR BLD: 5.8 %
ERYTHROCYTE [DISTWIDTH] IN BLOOD BY AUTOMATED COUNT: 15.1 %
EST. GFR  (AFRICAN AMERICAN): 33 ML/MIN/1.73 M^2
EST. GFR  (NON AFRICAN AMERICAN): 28.7 ML/MIN/1.73 M^2
GLUCOSE SERPL-MCNC: 133 MG/DL
HCT VFR BLD AUTO: 25.6 %
HGB BLD-MCNC: 8.6 G/DL
INR PPP: 0.9
LYMPHOCYTES # BLD AUTO: 0.9 K/UL
LYMPHOCYTES NFR BLD: 19.7 %
MCH RBC QN AUTO: 30.3 PG
MCHC RBC AUTO-ENTMCNC: 33.6 G/DL
MCV RBC AUTO: 90 FL
MONOCYTES # BLD AUTO: 0.4 K/UL
MONOCYTES NFR BLD: 8.2 %
NEUTROPHILS # BLD AUTO: 3.1 K/UL
NEUTROPHILS NFR BLD: 65.7 %
OVALOCYTES BLD QL SMEAR: ABNORMAL
PHENYTOIN FREE SERPL-MCNC: ABNORMAL UG/ML
PHENYTOIN, TOTAL: 7.8 MCG/ML
PLATELET # BLD AUTO: 260 K/UL
PLATELET BLD QL SMEAR: ABNORMAL
PMV BLD AUTO: 10.2 FL
POIKILOCYTOSIS BLD QL SMEAR: SLIGHT
POTASSIUM SERPL-SCNC: 4.7 MMOL/L
PROT SERPL-MCNC: 6.4 G/DL
PROTHROMBIN TIME: 9.4 SEC
RBC # BLD AUTO: 2.84 M/UL
SODIUM SERPL-SCNC: 137 MMOL/L
WBC # BLD AUTO: 4.66 K/UL

## 2017-08-04 PROCEDURE — 92610 EVALUATE SWALLOWING FUNCTION: CPT

## 2017-08-04 PROCEDURE — G8997 SWALLOW GOAL STATUS: HCPCS | Mod: CL

## 2017-08-04 PROCEDURE — 99239 HOSP IP/OBS DSCHRG MGMT >30: CPT | Mod: GC,,, | Performed by: INTERNAL MEDICINE

## 2017-08-04 PROCEDURE — 85610 PROTHROMBIN TIME: CPT

## 2017-08-04 PROCEDURE — 85025 COMPLETE CBC W/AUTO DIFF WBC: CPT

## 2017-08-04 PROCEDURE — 80053 COMPREHEN METABOLIC PANEL: CPT

## 2017-08-04 PROCEDURE — G8996 SWALLOW CURRENT STATUS: HCPCS | Mod: CN

## 2017-08-04 PROCEDURE — 63600175 PHARM REV CODE 636 W HCPCS: Performed by: STUDENT IN AN ORGANIZED HEALTH CARE EDUCATION/TRAINING PROGRAM

## 2017-08-04 PROCEDURE — 36415 COLL VENOUS BLD VENIPUNCTURE: CPT

## 2017-08-04 PROCEDURE — 25000003 PHARM REV CODE 250: Performed by: STUDENT IN AN ORGANIZED HEALTH CARE EDUCATION/TRAINING PROGRAM

## 2017-08-04 RX ADMIN — STANDARDIZED SENNA CONCENTRATE AND DOCUSATE SODIUM 1 TABLET: 8.6; 5 TABLET, FILM COATED ORAL at 09:08

## 2017-08-04 RX ADMIN — HYDRALAZINE HYDROCHLORIDE 100 MG: 50 TABLET ORAL at 05:08

## 2017-08-04 RX ADMIN — LABETALOL HCL 500 MG: 200 TABLET, FILM COATED ORAL at 05:08

## 2017-08-04 RX ADMIN — FLUOXETINE 20 MG: 20 CAPSULE ORAL at 08:08

## 2017-08-04 RX ADMIN — AMLODIPINE BESYLATE 10 MG: 10 TABLET ORAL at 08:08

## 2017-08-04 RX ADMIN — ATORVASTATIN CALCIUM 40 MG: 20 TABLET, FILM COATED ORAL at 08:08

## 2017-08-04 RX ADMIN — ASPIRIN 81 MG: 81 TABLET, COATED ORAL at 08:08

## 2017-08-04 RX ADMIN — HEPARIN SODIUM 5000 UNITS: 5000 INJECTION, SOLUTION INTRAVENOUS; SUBCUTANEOUS at 05:08

## 2017-08-04 RX ADMIN — LEVETIRACETAM 750 MG: 750 TABLET ORAL at 08:08

## 2017-08-04 NOTE — PLAN OF CARE
Problem: SLP Goal  Goal: SLP Goal  Speech Language Pathology Goals  Goals expected to be met by 8/11/17  1. Pt will participate in ongoing swallow assessment to determine safety/feasibility of initiation of PO diet        Outcome: Ongoing (interventions implemented as appropriate)  Bedside Swallow Study initiated. Patient with limited acceptance of PO trials this service day (ice chips x2 only.) Fatigue and pain noted to impact participation this service day. See report for full details. ST to continue to assess in session. REC: continue NPO with alternative means nutrition/hydration/medication. Findings reviewed with nurse. Thank you.    JYOTI Aragon., [facility]-SLP  Speech-Language Pathology  Pager: [phone]  8/4/2017

## 2017-08-04 NOTE — PLAN OF CARE
Problem: Patient Care Overview  Goal: Plan of Care Review  Outcome: Outcome(s) achieved Date Met: 08/04/17  Pt to be D/C to home via ambulance.  at BS.    VSS and afebrile. Free from falls and safe. Maintained alertness, but exhibited disorientation to situation/place.    AVS to be provided c/ pertinent F/U info indicated.

## 2017-08-04 NOTE — NURSING
Called spouse to confirm D/C to home. Per spouse, pt can only be transported via stretcher.    Informed KARINA Moore CM, who will inform LANE.

## 2017-08-04 NOTE — PT/OT/SLP EVAL
Speech Language Pathology  Evaluation    Lucy Perez   MRN: [id number]   Admitting Diagnosis: Acute kidney injury (nontraumatic)    Diet recommendations: Solid Diet Level: NPO  Liquid Diet Level: NPO   - Continue alternative means nutrition/hydration/medication via PEG   - Strict oral care advised    SLP Treatment Date: 08/04/17  Speech Start Time: 1020     Speech Stop Time: 1038     Speech Total (min): 18 min       TREATMENT BILLABLE MINUTES:  Eval Swallow and Oral Function 18    Diagnosis: Acute kidney injury (nontraumatic)  The primary encounter diagnosis was Acute kidney injury (nontraumatic). 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    Cortical cataract of both eyes 7/26/2016    Degeneration of lumbar or lumbosacral intervertebral disc 3/5/2013    S/p MRI L-spine 5/2009     Depression     General anesthetics causing adverse effect in therapeutic use     Hemorrhagic  cerebrovascular accident (CVA)     8/2016 [place] [facility] [place] hemiparesis    History of stroke 6/28/2017    s/p R-MCA stroke with R-putaminal hemorrhagic transformation in 8/2016 and 11/2016 (s/[place]raniot[place] [facility] [place] with residual L hemiparesis, on AED s/p CVA      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    Hyperlipidemia     Hypertensive retinopathy of both eyes 7/26/2016    Impingement syndrome of right shoulder 7/2/2014    Left ventricular diastolic dysfunction with preserved systolic function 12/15/2015    Mixed anxiety and depressive disorder 3/5/2013    Obesity     THERESA (obstructive sleep apnea) 3/5/2013    No Home CPAP 2ndary to cost     Partial symptomatic epilepsy with complex partial seizures, not intractable, without status epilepticus     PEG (percutaneous endoscopic gastrostomy) status 6/28/2017    Renovascular hypertension 3/5/2013    Will resume home medications: amlodipine, labetalol, and hydralazine Will hold Lisinopril in setting of DARLING.   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, without long-term current use of insulin 8/2/2017    Type 2 diabetes mellitus with diabetic polyneuropathy, without long-term current use of insulin 10/22/2015    Type 2 diabetes mellitus with stage 3 chronic kidney disease, without long-term current use of insulin 10/22/2015    Vertebral artery stenosis 3/5/2013    S/p Stenting per Dr Burnett      Past Surgical History:   Procedure Laterality Date    BREAST SURGERY      breast reduction    COLONOSCOPY N/A 8/11/2016    Procedure: COLONOSCOPY;  Surgeon: Jerry Vilchis MD;  Location: [facility] ([address] [place] [address]);  Service: Endoscopy;  Laterality: N/A;   "Patient reports difficulty awaking from anesthesia in the past.    HYSTERECTOMY      ROTATOR CUFF REPAIR Right July 9, 2014    right side    stent placed      in vertebral artery    TUBAL LIGATION         Has the patient been evaluated by SLP for swallowing? : Yes  Keep patient NPO?: Yes   General Precautions: Standard, aspiration, fall, seizure, NPO        Most recent CXR 6/21: stable radiograph    Prior diet: NPO with PEG. MD reports Patient consuming water and dental soft items at home and ST consulted to reassess swallow fx.     Subjective:  SLP communicates with nurse pre/post session, nurse explains Patient fatigued and clears for low-level trials only  Patient presents fatigued and groaning in pain  Patient with minimal vocalizations, nods and says "nuh uh" occasionally  Patient goals: difficult to assess 2/2 decreased verbal expression     Pain/Comfort  Pain Rating 1: 5/10  Location - Side 1: Left  Location 1: hip  Pain Addressed 1: Reposition, Nurse notified  Pain Rating Post-Intervention 1: 5/10    Objective:   Patient found with: telemetry. Patient found asleep in bed, slow to arouse per MAX verbal/visual/tactile cues. Patient repositioned in bed and HOB elevated.     Oral Musculature Evaluation  Oral Musculature: general weakness  Dentition: lower dentures  Mucosal Quality: dry  Mandibular Strength and Mobility: impaired  Oral Labial Strength and Mobility: impaired pursing, impaired retraction  Lingual Strength and Mobility: impaired strength, impaired left lateral movement, impaired right lateral movement, impaired protrusion  Buccal Strength and Mobility: impaired  Volitional Cough: not able to elicit  Volitional Swallow: mildly delayed upon digital palpation   Voice Prior to PO Intake: limited voicing elicited, pt groaning, strained vocal quality noted     Bedside Swallow Eval:  Consistencies Assessed: Thin liquids ice chip sliversx2 Pt nods head and declines additional attempts to trial puree or " thickened liquids  Oral Phase: anterior loss and slow oral transit time  Pharyngeal Phase: multiple spontaneous swallows  Mildly decreased initiation of swallow palpated with trials of ice chips. Patient with multiple spontaneous swallows with second ice chip then decliens additional PO trials. Patient will require ongoing assessment to determine safety and efficiency of swallow fx and feasibility of initiation of PO diet. REC:Continue alternative means nutrition/hydration/medication via PEG. ST to continue to follow. Strict oral care advised.     Additional Treatment:  SLP educated Patient on SLP role, aspiration precautions and need for ongoing swallow assessment. Patient with minimal verbal expression and decreased eye contact 2/2 increased fatigue/pain and requires ongoing review. Pt repositioned in bed, call light in reach at end of session. Whiteboard updated. No further questions. Findings reviewed with nurse following session.     FIM:  Social Interaction: 2 Maximal Direction--The patient interacts appropriately 25 to 49% of the time, but may beed restraint due to socially inappropriate behaviors.     Assessment:  Lucy Perez is a 56 y.o. female with a medical diagnosis of Acute kidney injury (nontraumatic) and presents with Dysphagia. Patient will require ongoing assessment to determine safety and efficiency of swallow fx and feasibility of initiation of PO diet.    Do you have any cultural, spiritual, Restorationist conflicts, given your current situation?: none noted     Discharge recommendations: Discharge Facility/Level Of Care Needs: nursing facility, skilled     Goals:    SLP Goals        Problem: SLP Goal    Goal Priority Disciplines Outcome   SLP Goal     SLP Ongoing (interventions implemented as appropriate)   Description:  Speech Language Pathology Goals  Goals expected to be met by 8/11/17  1. Pt will participate in ongoing swallow assessment to determine safety/feasibility of initiation  of PO diet                           Plan:   Patient to be seen Therapy Frequency: 5 x/week   Plan of Care expires: 09/03/17  Plan of Care reviewed with: patient  SLP Follow-up?: Yes         SLP G-Codes  Functional Assessment Tool Used: NOMS  Score: 1  Functional Limitations: Swallowing  Swallow Current Status (): CN  Swallow Goal Status (): CL    OMAIRA Aragon, CCC-SLP  Speech-Language Pathology  Pager: [phone]  8/4/2017

## 2017-08-04 NOTE — PLAN OF CARE
Problem: Patient Care Overview  Goal: Plan of Care Review  Outcome: Ongoing (interventions implemented as appropriate)  Patient awake, alert, and oriented. Patient's vitals remain stable. Patient remains free from falls/injury throughout shift. Patient remains NPO with bolus feeds and meds given through peg tube. Neuro checks done Q4hrs per MD orders. Will continue to monitor.

## 2017-08-04 NOTE — PLAN OF CARE
LANE learned from CM, Marylu Valdez, that Pt was anticipated to discharge home today and would need her home health resumed. Pt was current with [facility]. LANE sent all necessary referral information to [facility] via [facility].     Pt will also informed that Pt will require stretcher transportation home. LANE placed call to [facility] ([phone]) to arrange stretcher transport for within the hour. LANE notified Pt's nurse of ride arrangement.     Mesha Hebert LCSW

## 2017-08-04 NOTE — PLAN OF CARE
[facility]    HOME HEALTH ORDERS  FACE TO FACE ENCOUNTER    Patient Name: Lucy Perez  YOB: 1960    PCP: Beverly Muniz MD   PCP Address: [address] / [place] [address]  PCP Phone Number: [phone]  PCP Fax: [phone]    Encounter Date: 08/04/2017    Admit to Home Health    Diagnoses:  Active Hospital Problems    Diagnosis  POA    Stage 3 chronic kidney disease [N18.3]  Yes     Priority: 2     Left ventricular diastolic dysfunction with preserved systolic function [I51.9]  Yes     Priority: 3     Partial symptomatic epilepsy with complex partial seizures, not intractable, without status epilepticus [G40.209]  Yes     Priority: 4     Anemia in stage 3 chronic kidney disease [N18.3, D63.1]  Yes     Priority: 5     PEG (percutaneous endoscopic gastrostomy) status [Z93.1]  Not Applicable     Chronic    History of stroke [Z86.73]  Not Applicable     s/p R-MCA stroke with R-putaminal hemorrhagic transformation in 8/2016 and 11/2016 (s/p hemicraniotomy at [facility]) with residual L hemiparesis, on AED s/p CVA        Left spastic hemiparesis [G81.14]  Yes    Type 2 diabetes mellitus with stage 3 chronic kidney disease, without long-term current use of insulin [E11.22, N18.3]  Yes     Chronic    Renovascular hypertension [I15.0]  Yes     Chronic     Will resume home medications: amlodipine, labetalol, and hydralazine  Will hold Lisinopril in setting of DARLING.      THERESA (obstructive sleep apnea) [G47.33]  Yes    Mixed anxiety and depressive disorder [F41.8]  Yes      Resolved Hospital Problems    Diagnosis Date Resolved POA    *Acute kidney injury (nontraumatic) [N17.9] 08/04/2017 Yes     Priority: 1 - High       Future Appointments  Date Time Provider Department Center   8/10/2017 10:00 AM Beverly Muniz MD [facility]     Follow-up Information     Beverly Muniz MD On 8/10/2017.    Specialty:  Internal Medicine  Why:  Thursday 8/10 @  10am  Contact information:  [address]  [facility] [address]  [phone]             [facility] [place].    Specialty:  Home Health Services  Why:  Home Health  Contact information:  [address]  [address]  [place] [id number] [phone]                     I have seen and examined this patient face to face today. My clinical findings that support the need for the home health skilled services and home bound status are the following:  Weakness/numbness causing balance and gait disturbance due to Stroke making it taxing to leave home.    Allergies:  Review of patient's allergies indicates:   Allergen Reactions    Lisinopril Other (See Comments)     Angioedema      Vicodin [hydrocodone-acetaminophen] Rash       Diet: Boost 3 times daily through PEG tube and pleasure feeds as appropriate per speech    Activities: activity as tolerated    Nursing:   SN to complete comprehensive assessment including routine vital signs. Instruct on disease process and s/s of complications to report to MD. Review/verify medication list sent home with the patient at time of discharge  and instruct patient/caregiver as needed. Frequency may be adjusted depending on start of care date.    Notify MD if SBP > 160 or < 90; DBP > 90 or < 50; HR > 120 or < 50; Temp > 101;       CONSULTS:    Physical Therapy to evaluate and treat. Evaluate for home safety and equipment needs; Establish/upgrade home exercise program. Perform / instruct on therapeutic exercises, gait training, transfer training, and Range of Motion.  Occupational Therapy to evaluate and treat. Evaluate home environment for safety and equipment needs. Perform/Instruct on transfers, ADL training, ROM, and therapeutic exercises.  Speech Therapy  to evaluate and treat for  Swallowing.    MISCELLANEOUS CARE:  PEG Care:  Instruct patient/caregiver to clean site.  Monitor skin integrity.    WOUND CARE ORDERS  n/a      Medications: Review discharge medications  "with patient and family and provide education.      Current Discharge Medication List      CONTINUE these medications which have NOT CHANGED    Details   albuterol (PROVENTIL) 2.5 mg /3 mL (0.083 %) nebulizer solution Take 3 mLs (2.5 mg total) by nebulization every 6 (six) hours as needed for Wheezing. Rescue  Qty: 25 each, Refills: 3    Associated Diagnoses: Chest congestion      amlodipine (NORVASC) 10 MG tablet Take 1 tablet (10 mg total) by mouth once daily.  Qty: 90 tablet, Refills: 2    Associated Diagnoses: Essential hypertension      aspirin (ECOTRIN) 81 MG EC tablet Take 81 mg by mouth once daily.        atorvastatin (LIPITOR) 40 MG tablet Take 1 tablet (40 mg total) by mouth once daily. For Cholesterol  Qty: 90 tablet, Refills: 2    Associated Diagnoses: Pure hypercholesterolemia      BD INSULIN PEN NEEDLE UF SHORT 31 gauge x 5/16" Ndle USE TO INJECT NOVOLOG FLEXPEN BEFORE MEALS  Qty: 150 each, Refills: 11      blood sugar diagnostic (ACCU-CHEK SMARTVIEW TEST STRIP) Strp 1 strip by Misc.(Non-Drug; Combo Route) route 2 (two) times daily.  Qty: 300 each, Refills: 3    Associated Diagnoses: Type 2 diabetes mellitus with chronic kidney disease      clotrimazole-betamethasone 1-0.05% (LOTRISONE) cream Apply topically 2 (two) times daily. Apply to area of rash/iting on buttocks 1-2x/day as needed  Qty: 45 g, Refills: 3    Associated Diagnoses: Rash      dantrolene (DANTRIUM) 25 MG Cap Take 25 mg by mouth. at 10 pm      famotidine (PEPCID) 20 MG tablet TAKE 1 TABLET (20 MG TOTAL) BY MOUTH ONCE DAILY.  Qty: 90 tablet, Refills: 3    Associated Diagnoses: Hemorrhagic cerebrovascular accident (CVA)      fluoxetine 20 MG tablet TAKE 1 TABLET (20 MG TOTAL) BY MOUTH ONCE DAILY.  Qty: 90 tablet, Refills: 2      gabapentin (NEURONTIN) 300 MG capsule 1 cap at Bedtime  Qty: 30 capsule, Refills: 0    Associated Diagnoses: Degeneration of lumbar or lumbosacral intervertebral disc      hydrALAZINE (APRESOLINE) 100 MG tablet " Take 1 tablet (100 mg total) by mouth 3 (three) times daily.  Qty: 270 tablet, Refills: 3    Associated Diagnoses: Essential hypertension      hydrochlorothiazide (HYDRODIURIL) 25 MG tablet Take 25 mg by mouth once daily.      insulin detemir (LEVEMIR FLEXTOUCH) 100 unit/mL (3 mL) SubQ InPn pen 14 units in AM  Qty: 2 Box, Refills: 0    Associated Diagnoses: Controlled type 1 diabetes mellitus with stage 3 chronic kidney disease      labetalol (NORMODYNE) 100 MG tablet Take 500 mg by mouth every 8 (eight) hours.      levetiracetam (KEPPRA) 500 MG Tab Take 4 tablets (2,000 mg total) by mouth 2 (two) times daily.  Qty: 240 tablet, Refills: 2    Associated Diagnoses: Hemorrhagic cerebrovascular accident (CVA)      multivitamin with iron Tab 1/day  Qty: 30 each, Refills: prn      nut.tx.gluc.intol,lac-free,soy (GLUCERNA 1.5 NOAH) Liqd 1.5 cans in AM, 1 can at Noon , 1.5cans at Dinner, and 1 can before Bed/Total 5 cans daily  Qty: 150 Can, Refills: 0    Associated Diagnoses: Dysphagia, unspecified type; Nontraumatic subcortical hemorrhage of right cerebral hemisphere      phenytoin (DILANTIN) 300 MG ER capsule Take 300 mg by mouth every evening.      spironolactone (ALDACTONE) 50 MG tablet Take 1 tablet (50 mg total) by mouth once daily.  Qty: 30 tablet, Refills: 0      alprazolam (XANAX) 0.5 MG tablet 1/2 to 1 tab Q8 hours as needed for anxiety  Qty: 30 tablet, Refills: 0    Associated Diagnoses: Anxiety      colchicine 0.6 mg tablet 1 tab daily as needed for gout flare  Qty: 30 tablet, Refills: 1    Associated Diagnoses: Gout, arthritis         STOP taking these medications       furosemide (LASIX) 40 MG tablet Comments:   Reason for Stopping:               I certify that this patient is confined to her home and needs physical therapy, speech therapy and occupational therapy.    Floyd Chinchilla MD  Internal Medicine PGY-1  [facility]

## 2017-08-04 NOTE — DISCHARGE SUMMARY
[facility] Hospital Medicine  Discharge Summary      Patient Name: Lucy Perez  MRN: [id number]  Admission Date: 8/2/2017  Hospital Length of Stay: 2 days  Discharge Date and Time: No discharge date for patient encounter.  Attending Physician: Edna Waller MD   Discharging Provider: Floyd Chinchilla MD  Primary Care Provider: Beverly Muniz MD  Hospital Medicine Team: [facility] 4 Floyd Chinchilla MD    HPI:   56F s/p R-MCA stroke with R-putaminal hemorrhagic transformation in 8/2016 and 11/2016 (s/p [place]raniotomy at [facility]) with residual L hemiparesis, on AED s/p CVA (keppra/tegretol). Tegretol stopped last admission, PEG status, chronic diastolic heart failure, DM2, HTN, HLD, sarcoidosis, RA, CKDIII. Preset to the ER by PCP advise for abnormal labs ''  DARLING with worsening acidosis, hyperkalemia and low UOP'' . Patient denied any new symptoms from her last admission. In the last admission she wasd treated for UTI (  ENTEROBACTER treated with cipro ) she denied any poor oral intake, diarrhea, reduce water intake per PEG per . Also denied any abdominal pain, burning urine, fever, chills, cough, chest pain, skin boils.   She was on diurtics ( HCTZ, lasix and aldactone ) plus famotidine.       * No surgery found *      Indwelling Lines/Drains at time of discharge:   Lines/Drains/Airways     Drain                 Gastrostomy/Enterostomy LUQ 07857 days         Gastrostomy/Enterostomy Percutaneous endoscopic gastrostomy (PEG) LUQ 98734 days          Epidural Line                 Perineural Analgesia/Anesthesia Assessment (using dermatomes) Epidural 00630 days              Hospital Course:   Lucy Perez presented with dehydration found to have an DARLING with a creatinine of 2.8 on admission. On review patient was taking lasix, HCTZ, and aldactone as well as receiving suboptimal hydration through PEG Tube. After rehydration patients creatinine continue to trend  down, she was stable and at baseline on the day of discharge with improved renal function. Her lasix will be stopped moving forward and her  was educated on giving the patient appropriate hydration at home. She is receiving home health orders and will f/u in 1 week with PCP.      Patient was seen and examined on day of discharge. Vital signs reviewed and exam as follows:    Physical Exam   Constitutional: She appears well-developed and well-nourished. She appears lethargic.   HENT:   Head: Normocephalic and atraumatic.   Eyes: Conjunctivae, EOM and lids are normal.   Neck: Phonation normal. No edema present.   Cardiovascular: Normal rate, regular rhythm and normal heart sounds.    Pulmonary/Chest: Effort normal and breath sounds normal.   Coarse breath sounds heard throughout, cough present which  states is new   Abdominal: Soft. Normal appearance and bowel sounds are normal. There is no hepatosplenomegaly.   Neurological: She appears lethargic.   Easily arousable, but does not respond to questioning due to expressive and perhaps receptive aphasia. Currently at baseline per  who was present for examination.    Skin: Skin is warm, dry and intact.   Psychiatric: She has a normal mood and affect. Her speech is normal and behavior is normal.     Consults:     Significant Diagnostic Studies: Labs:   CMP   Recent Labs  Lab 08/03/17  0359 08/04/17  0736    137   K 4.3 4.7    108   CO2 21* 19*   * 133*   BUN 80* 66*   CREATININE 2.4* 1.9*   CALCIUM 9.1 9.2   PROT 6.6 6.4   ALBUMIN 3.2* 3.1*   BILITOT 0.2 0.2   ALKPHOS 102 97   AST 10 12   ALT 9* 9*   ANIONGAP 9 10   ESTGFRAFRICA 24.9* 33.0*   EGFRNONAA 21.6* 28.7*    and CBC   Recent Labs  Lab 08/03/17  0359 08/04/17  0434   WBC 5.49 4.66   HGB 8.4* 8.6*   HCT 26.1* 25.6*    260       Pending Diagnostic Studies:     Procedure Component Value Units Date/Time    Phenytoin level, total and free [[id number]] Collected:  08/02/17  1120    Order Status:  Sent Lab Status:  In process Updated:  08/02/17 1147    Specimen:  Blood from Blood         Final Active Diagnoses:    Diagnosis Date Noted POA    Stage 3 chronic kidney disease [N18.3] 03/25/2013 Yes    Left ventricular diastolic dysfunction with preserved systolic function [I51.9] 12/15/2015 Yes    Partial symptomatic epilepsy with complex partial seizures, not intractable, without status epilepticus [G40.209] 06/06/2017 Yes    Anemia in stage 3 chronic kidney disease [N18.3, D63.1] 08/02/2017 Yes    PEG (percutaneous endoscopic gastrostomy) status [Z93.1] 06/28/2017 Not Applicable     Chronic    History of stroke [Z86.73] 06/28/2017 Not Applicable    Left spastic hemiparesis [G81.14] 10/22/2016 Yes    Type 2 diabetes mellitus with stage 3 chronic kidney disease, without long-term current use of insulin [E11.22, N18.3] 10/22/2015 Yes     Chronic    Renovascular hypertension [I15.0] 03/05/2013 Yes     Chronic    THERESA (obstructive sleep apnea) [G47.33] 03/05/2013 Yes    Mixed anxiety and depressive disorder [F41.8] 03/05/2013 Yes      Problems Resolved During this Admission:    Diagnosis Date Noted Date Resolved POA    PRINCIPAL PROBLEM:  Acute kidney injury (nontraumatic) [N17.9] 08/02/2017 08/04/2017 Yes      No new Assessment & Plan notes have been filed under this hospital service since the last note was generated.  Service: Hospital Medicine      Discharged Condition: stable    Disposition: Home or Self Care    Follow Up:  Follow-up Information     Beverly Muniz MD On 8/10/2017.    Specialty:  Internal Medicine  Why:  Thursday 8/10 @ 10am  Contact information:  [address]  [facility] [address]  [phone]             [facility].    Specialty:  Home Health Services  Why:  Home Health  Contact information:  [address]  [address]  [place] [address]  [phone]                 Patient Instructions:     Ambulatory referral to Outpatient  "Case Management   Referral Priority: Routine Referral Type: Consultation   Referral Reason: Specialty Services Required    Number of Visits Requested: 1      Diet Diabetic 1800 Calories   Scheduling Instructions: Boost 3 times daily through G-tube  -Resume diet prior to admission     Call MD for:  temperature >100.4     Call MD for:  increased confusion or weakness     Call MD for:  persistent dizziness, light-headedness, or visual disturbances     No dressing needed       Medications:  Reconciled Home Medications:   Current Discharge Medication List      CONTINUE these medications which have NOT CHANGED    Details   albuterol (PROVENTIL) 2.5 mg /3 mL (0.083 %) nebulizer solution Take 3 mLs (2.5 mg total) by nebulization every 6 (six) hours as needed for Wheezing. Rescue  Qty: 25 each, Refills: 3    Associated Diagnoses: Chest congestion      amlodipine (NORVASC) 10 MG tablet Take 1 tablet (10 mg total) by mouth once daily.  Qty: 90 tablet, Refills: 2    Associated Diagnoses: Essential hypertension      aspirin (ECOTRIN) 81 MG EC tablet Take 81 mg by mouth once daily.        atorvastatin (LIPITOR) 40 MG tablet Take 1 tablet (40 mg total) by mouth once daily. For Cholesterol  Qty: 90 tablet, Refills: 2    Associated Diagnoses: Pure hypercholesterolemia      BD INSULIN PEN NEEDLE UF SHORT 31 gauge x 5/16" Ndle USE TO INJECT NOVOLOG FLEXPEN BEFORE MEALS  Qty: 150 each, Refills: 11      blood sugar diagnostic (ACCU-CHEK SMARTVIEW TEST STRIP) Strp 1 strip by Misc.(Non-Drug; Combo Route) route 2 (two) times daily.  Qty: 300 each, Refills: 3    Associated Diagnoses: Type 2 diabetes mellitus with chronic kidney disease      clotrimazole-betamethasone 1-0.05% (LOTRISONE) cream Apply topically 2 (two) times daily. Apply to area of rash/iting on buttocks 1-2x/day as needed  Qty: 45 g, Refills: 3    Associated Diagnoses: Rash      dantrolene (DANTRIUM) 25 MG Cap Take 25 mg by mouth. at 10 pm      famotidine (PEPCID) 20 MG " tablet TAKE 1 TABLET (20 MG TOTAL) BY MOUTH ONCE DAILY.  Qty: 90 tablet, Refills: 3    Associated Diagnoses: Hemorrhagic cerebrovascular accident (CVA)      fluoxetine 20 MG tablet TAKE 1 TABLET (20 MG TOTAL) BY MOUTH ONCE DAILY.  Qty: 90 tablet, Refills: 2      gabapentin (NEURONTIN) 300 MG capsule 1 cap at Bedtime  Qty: 30 capsule, Refills: 0    Associated Diagnoses: Degeneration of lumbar or lumbosacral intervertebral disc      hydrALAZINE (APRESOLINE) 100 MG tablet Take 1 tablet (100 mg total) by mouth 3 (three) times daily.  Qty: 270 tablet, Refills: 3    Associated Diagnoses: Essential hypertension      hydrochlorothiazide (HYDRODIURIL) 25 MG tablet Take 25 mg by mouth once daily.      insulin detemir (LEVEMIR FLEXTOUCH) 100 unit/mL (3 mL) SubQ InPn pen 14 units in AM  Qty: 2 Box, Refills: 0    Associated Diagnoses: Controlled type 1 diabetes mellitus with stage 3 chronic kidney disease      labetalol (NORMODYNE) 100 MG tablet Take 500 mg by mouth every 8 (eight) hours.      levetiracetam (KEPPRA) 500 MG Tab Take 4 tablets (2,000 mg total) by mouth 2 (two) times daily.  Qty: 240 tablet, Refills: 2    Associated Diagnoses: Hemorrhagic cerebrovascular accident (CVA)      multivitamin with iron Tab 1/day  Qty: 30 each, Refills: prn      nut.tx.gluc.intol,lac-free,soy (GLUCERNA 1.5 NOAH) Liqd 1.5 cans in AM, 1 can at Noon , 1.5cans at Dinner, and 1 can before Bed/Total 5 cans daily  Qty: 150 Can, Refills: 0    Associated Diagnoses: Dysphagia, unspecified type; Nontraumatic subcortical hemorrhage of right cerebral hemisphere      phenytoin (DILANTIN) 300 MG ER capsule Take 300 mg by mouth every evening.      spironolactone (ALDACTONE) 50 MG tablet Take 1 tablet (50 mg total) by mouth once daily.  Qty: 30 tablet, Refills: 0      alprazolam (XANAX) 0.5 MG tablet 1/2 to 1 tab Q8 hours as needed for anxiety  Qty: 30 tablet, Refills: 0    Associated Diagnoses: Anxiety      colchicine 0.6 mg tablet 1 tab daily as needed  for gout flare  Qty: 30 tablet, Refills: 1    Associated Diagnoses: Gout, arthritis         STOP taking these medications       furosemide (LASIX) 40 MG tablet Comments:   Reason for Stopping:             HOS POC IP DISCHARGE SUMMARY    Floyd Chinchilla MD  Department of Hospital Medicine  [facility]

## 2017-08-04 NOTE — PLAN OF CARE
[facility]     08/04/17 1638   Final Note   Assessment Type Final Discharge Note   Discharge Disposition Home-Health   What phone number can be called within the next 1-3 days to see how you are doing after discharge? [phone]   Hospital Follow Up  Appt(s) scheduled? Yes   [facility]charge[facility]ospital [facility] n/a   Referral to / orders for Home Health Complete? Yes   Right Care Referral Info   Post Acute Recommendation Home-care   Referral Type h/h   [facility]

## 2017-08-07 ENCOUNTER — TELEPHONE (OUTPATIENT)
Dept: ADMINISTRATIVE | Facility: CLINIC | Age: 57
End: 2017-08-07

## 2017-08-07 ENCOUNTER — OUTPATIENT CASE MANAGEMENT (OUTPATIENT)
Dept: ADMINISTRATIVE | Facility: OTHER | Age: 57
End: 2017-08-07

## 2017-08-07 ENCOUNTER — PATIENT OUTREACH (OUTPATIENT)
Dept: ADMINISTRATIVE | Facility: CLINIC | Age: 57
End: 2017-08-07

## 2017-08-07 NOTE — PROGRESS NOTES
Thank you for the referral.   For your information:    The following patient has been assigned to Mona Wheatley RN with Outpatient Complex Care Management for high risk screening.    Reason: Acute kidney injury (nontraumatic)    Please contact [facility] at [phone] with any questions.    Thank you,  Anita Zamudio

## 2017-08-07 NOTE — PATIENT INSTRUCTIONS
Discharge Instructions for Acute Kidney Injury  You have been diagnosed with acute kidney injury. This means that your kidneys are not working properly. When both kidneys are healthy, they help filter out fluid and waste from the blood and body. Acute kidney injury has many causes including urinary blockages, infection, lack of enough blood supply, and medications that can injure kidneys. In some cases, acute kidney injury is temporary, lasting several days to a few months, because the kidney has the capacity to repair itself. But, it may also result in chronic kidney disease or end stage renal failure. Here are some instructions for you to follow as you recover.  Home care  · Follow any instructions for eating and drinking given to you by your doctor.  ¨ Drink less fluid, if instructed by your doctor.  ¨ Keep a record of everything you eat and drink.  · Measure the amount of urine and stool you have each day.  · Weigh yourself every day, at the same time of day, and in the same kind of clothes. Keep a daily record of your daily weights.  · Take your temperature every day. Keep a record of the results.  · Learn to take your own blood pressure. Keep a record of your results. Ask your doctor when you should seek emergency medical attention. He or she will tell you which blood pressure reading is dangerous.  · Avoid contact with people who have infections (colds, bronchitis, or skin conditions).  · Practice good personal hygiene. This is especially important if you have a catheter in place when you leave the hospital. Doing so helps keep you safe from infection.  · Take your medications exactly as directed.  · You may require frequent blood and urine tests to monitor your kidney function.  Follow-up care  Make a follow-up appointment as directed by our staff.     When to seek medical care  Call your doctor right away if you have any of the following:  · Signs of bladder infection (urinating more often than usual;  burning, pain, bleeding, or hesitancy when you urinate)  · Signs of infection around your catheter (redness, swelling, warmth, or drainage)  · Rapid weight loss or gain, such as 3 pounds or more in 24 hours or 6 pounds or more in 7 days  · Fever above 100.4°F (38.0°C) or chills  · Muscle aches  · Night sweats  · Very little or no urine output  · Swelling of your hands, legs, or feet  · Back pain  · Abdominal pain  · Extreme tiredness   Date Last Reviewed: 1/6/2015  © 5893-0095 [employer]. [address] [place] [address]. All rights reserved. This information is not intended as a substitute for professional medical care. Always follow your healthcare professional's instructions.

## 2017-08-08 ENCOUNTER — TELEPHONE (OUTPATIENT)
Dept: INTERNAL MEDICINE | Facility: CLINIC | Age: 57
End: 2017-08-08

## 2017-08-08 NOTE — TELEPHONE ENCOUNTER
Please forward this important TCC information to your provider in order to maximize the post discharge care delivery of this patient.     C3 nurse spoke with Lucy Perez  for a TCC post hospital discharge follow up call. The patient has a scheduled HOSFU appointment with Beverly Muniz MD on 8/10/17 @ 1000.   Respectfully,   Fortino Dash LPN   [facility]     [email or web address]       Please do not reply to this message, as this inbox is not routinely monitored.     (Routing comment)       Fortino Dash LPN 14 hours ago (5:20 PM)        Discharge Instructions for Acute Kidney Injury  You have been diagnosed with acute kidney injury. This means that your kidneys are not working properly. When both kidneys are healthy, they help filter out fluid and waste from the blood and body. Acute kidney injury has many causes including urinary blockages, infection, lack of enough blood supply, and medications that can injure kidneys. In some cases, acute kidney injury is temporary, lasting several days to a few months, because the kidney has the capacity to repair itself. But, it may also result in chronic kidney disease or end stage renal failure. Here are some instructions for you to follow as you recover.  Home care  · Follow any instructions for eating and drinking given to you by your doctor.  ? Drink less fluid, if instructed by your doctor.  ? Keep a record of everything you eat and drink.  · Measure the amount of urine and stool you have each day.  · Weigh yourself every day, at the same time of day, and in the same kind of clothes. Keep a daily record of your daily weights.  · Take your temperature every day. Keep a record of the results.  · Learn to take your own blood pressure. Keep a record of your results. Ask your doctor when you should seek emergency medical attention. He or she will tell you which blood pressure reading is dangerous.  · Avoid contact with people  who have infections (colds, bronchitis, or skin conditions).  · Practice good personal hygiene. This is especially important if you have a catheter in place when you leave the hospital. Doing so helps keep you safe from infection.  · Take your medications exactly as directed.  · You may require frequent blood and urine tests to monitor your kidney function.  Follow-up care  Make a follow-up appointment as directed by our staff.     When to seek medical care  Call your doctor right away if you have any of the following:  · Signs of bladder infection (urinating more often than usual; burning, pain, bleeding, or hesitancy when you urinate)  · Signs of infection around your catheter (redness, swelling, warmth, or drainage)  · Rapid weight loss or gain, such as 3 pounds or more in 24 hours or 6 pounds or more in 7 days  · Fever above 100.4°F (38.0°C) or chills  · Muscle aches  · Night sweats  · Very little or no urine output  · Swelling of your hands, legs, or feet  · Back pain  · Abdominal pain  · Extreme tiredness   Date Last Reviewed: 1/6/2015  © 2157-6400 [employer]. [address] [place] [address], [place] [address]. All rights reserved. This information is not intended as a substitute for professional medical care. Always follow your healthcare professional's instructions.

## 2017-08-10 ENCOUNTER — TELEPHONE (OUTPATIENT)
Dept: INTERNAL MEDICINE | Facility: CLINIC | Age: 57
End: 2017-08-10

## 2017-08-10 ENCOUNTER — HOSPITAL ENCOUNTER (OUTPATIENT)
Dept: RADIOLOGY | Facility: HOSPITAL | Age: 57
Discharge: HOME | End: 2017-08-10
Attending: INTERNAL MEDICINE
Payer: MEDICARE

## 2017-08-10 ENCOUNTER — OFFICE VISIT (OUTPATIENT)
Dept: PHYSICAL MEDICINE AND REHAB | Facility: CLINIC | Age: 57
End: 2017-08-10
Payer: MEDICARE

## 2017-08-10 ENCOUNTER — OFFICE VISIT (OUTPATIENT)
Dept: INTERNAL MEDICINE | Facility: CLINIC | Age: 57
End: 2017-08-10
Payer: MEDICARE

## 2017-08-10 VITALS
DIASTOLIC BLOOD PRESSURE: 60 MMHG | BODY MASS INDEX: 28.35 KG/M2 | WEIGHT: 160 LBS | HEART RATE: 67 BPM | SYSTOLIC BLOOD PRESSURE: 130 MMHG | HEIGHT: 63 IN

## 2017-08-10 DIAGNOSIS — I61.9 HEMORRHAGIC CEREBROVASCULAR ACCIDENT (CVA): ICD-10-CM

## 2017-08-10 DIAGNOSIS — N17.9 ACUTE RENAL FAILURE, UNSPECIFIED ACUTE RENAL FAILURE TYPE: Primary | ICD-10-CM

## 2017-08-10 DIAGNOSIS — N17.9 ACUTE RENAL FAILURE, UNSPECIFIED ACUTE RENAL FAILURE TYPE: ICD-10-CM

## 2017-08-10 DIAGNOSIS — B35.6 TINEA CRURIS: ICD-10-CM

## 2017-08-10 DIAGNOSIS — D53.9 NUTRITIONAL ANEMIA: ICD-10-CM

## 2017-08-10 DIAGNOSIS — I10 ESSENTIAL HYPERTENSION: ICD-10-CM

## 2017-08-10 DIAGNOSIS — Z93.1 PEG (PERCUTANEOUS ENDOSCOPIC GASTROSTOMY) STATUS: Chronic | ICD-10-CM

## 2017-08-10 DIAGNOSIS — D64.9 ANEMIA, UNSPECIFIED TYPE: ICD-10-CM

## 2017-08-10 DIAGNOSIS — R05.9 COUGH: ICD-10-CM

## 2017-08-10 DIAGNOSIS — G81.14 LEFT SPASTIC HEMIPARESIS: Primary | ICD-10-CM

## 2017-08-10 DIAGNOSIS — G89.0 CENTRAL PAIN SYNDROME: ICD-10-CM

## 2017-08-10 DIAGNOSIS — I63.9 CEREBROVASCULAR ACCIDENT (CVA), UNSPECIFIED MECHANISM: Primary | ICD-10-CM

## 2017-08-10 DIAGNOSIS — R21 RASH: ICD-10-CM

## 2017-08-10 PROCEDURE — 3008F BODY MASS INDEX DOCD: CPT | Mod: S$GLB,,, | Performed by: INTERNAL MEDICINE

## 2017-08-10 PROCEDURE — 99214 OFFICE O/P EST MOD 30 MIN: CPT | Mod: S$GLB,,, | Performed by: PHYSICAL MEDICINE & REHABILITATION

## 2017-08-10 PROCEDURE — 71020 XR CHEST PA AND LATERAL: CPT | Mod: TC,PO

## 2017-08-10 PROCEDURE — 3074F SYST BP LT 130 MM HG: CPT | Mod: S$GLB,,, | Performed by: PHYSICAL MEDICINE & REHABILITATION

## 2017-08-10 PROCEDURE — 3075F SYST BP GE 130 - 139MM HG: CPT | Mod: S$GLB,,, | Performed by: INTERNAL MEDICINE

## 2017-08-10 PROCEDURE — 99499 UNLISTED E&M SERVICE: CPT | Mod: S$GLB,,, | Performed by: PHYSICAL MEDICINE & REHABILITATION

## 2017-08-10 PROCEDURE — 99999 PR PBB SHADOW E&M-EST. PATIENT-LVL IV: CPT | Mod: PBBFAC,,, | Performed by: INTERNAL MEDICINE

## 2017-08-10 PROCEDURE — 99999 PR PBB SHADOW E&M-EST. PATIENT-LVL II: CPT | Mod: PBBFAC,,, | Performed by: PHYSICAL MEDICINE & REHABILITATION

## 2017-08-10 PROCEDURE — 99215 OFFICE O/P EST HI 40 MIN: CPT | Mod: S$GLB,,, | Performed by: INTERNAL MEDICINE

## 2017-08-10 PROCEDURE — 99499 UNLISTED E&M SERVICE: CPT | Mod: S$GLB,,, | Performed by: INTERNAL MEDICINE

## 2017-08-10 PROCEDURE — 3078F DIAST BP <80 MM HG: CPT | Mod: S$GLB,,, | Performed by: INTERNAL MEDICINE

## 2017-08-10 PROCEDURE — 71020 XR CHEST PA AND LATERAL: CPT | Mod: 26,,, | Performed by: RADIOLOGY

## 2017-08-10 PROCEDURE — 3078F DIAST BP <80 MM HG: CPT | Mod: S$GLB,,, | Performed by: PHYSICAL MEDICINE & REHABILITATION

## 2017-08-10 RX ORDER — LEVETIRACETAM 500 MG/1
500 TABLET ORAL EVERY 8 HOURS
Qty: 90 TABLET | Refills: 0
Start: 2017-08-10 | End: 2018-03-07 | Stop reason: SDUPTHER

## 2017-08-10 RX ORDER — NYSTATIN 100000 [USP'U]/G
POWDER TOPICAL 2 TIMES DAILY
Qty: 56.7 G | Refills: 3 | Status: SHIPPED | OUTPATIENT
Start: 2017-08-10 | End: 2018-02-21

## 2017-08-10 RX ORDER — PREGABALIN 75 MG/1
75 CAPSULE ORAL 2 TIMES DAILY
Qty: 60 CAPSULE | Refills: 6 | Status: SHIPPED | OUTPATIENT
Start: 2017-08-10 | End: 2017-08-10 | Stop reason: SDUPTHER

## 2017-08-10 RX ORDER — PREGABALIN 75 MG/1
75 CAPSULE ORAL 2 TIMES DAILY
Qty: 60 CAPSULE | Refills: 6 | Status: SHIPPED | OUTPATIENT
Start: 2017-08-10 | End: 2017-09-18

## 2017-08-10 RX ORDER — CLOTRIMAZOLE AND BETAMETHASONE DIPROPIONATE 10; .64 MG/G; MG/G
CREAM TOPICAL 2 TIMES DAILY
Qty: 45 G | Refills: 3 | Status: SHIPPED | OUTPATIENT
Start: 2017-08-10 | End: 2018-02-21

## 2017-08-10 NOTE — PROGRESS NOTES
Ms. Perez is a very sweet 56-year-old female, known to myself, who presents   today with daughter, Reina, for followup hospitalization for acute renal   failure.    HISTORY OF PRESENT ILLNESS:  Ms. Ayala presents with daughter, Reina, for   followup of the above.  She actually was admitted to the hospital on August 2nd   with acute renal failure and hyperkalemia.  The patient was hydrated, with renal   status improving.  Her Lasix was discontinued at that time and she improved   immensely.  She has been having some chest congestion and there was a concern   that this could be a questionable aspiration, so she is not supposed to be   drinking or eating anything by mouth.  She was recommended to have a modified   barium swallow study, which I placed orders for again today to determine if she   can start with a trial of oral eating or drinking.  She does occasionally try   and drink something at home by mouth, which daughter, Reina, notes she has   done fine with.  She has had no gross aspiration, but she notes that she has   given them a hard time about not giving anything by mouth.  She has had a little   bit of a cough with some congestion.  No purulent mucus.  No fevers or chills.    She continues to have pain in the left arm and leg, which is the side affected   by her hemorrhagic stroke.  She does have some itching on the left side, but has   no rash.  She does tend to lay with the majority of her weight on her left side   due to the left-sided weakness and hemiparesis.  She does have a polyester    that she used to help move her about that she is contacting, otherwise not   using any new soaps or lotions or detergents or contacts.  She was admitted to   the hospital in June, as well, and diagnosed with complex partial seizures.    Keppra was started at that time.  She actually followed up to [facility] after that and is also on Dilantin 300 mg at bedtime.  Her prior   dantrolene had  been stopped.  She is not presently taking the baclofen or   anything else for pain due to prior seizures.  She has had no noted seizures in   the last month, since last discharge from [facility], which was first   week of July.  She did have an appointment with Dr. Galloway, but unfortunately   had transportation problems and missed the last appointment.    PAST MEDICAL, SURGICAL, SOCIAL HISTORY:  Please see as thoroughly stated in Epic   chart, which has been reviewed.    PHYSICAL EXAMINATION:  VITAL SIGNS:  Weight 160 pounds, blood pressure initially per nurse check   117/68, with repeat blood pressure per M.D. at 130/60.  GENERAL:  Ms. Ayala is seated in a lightweight reclinable wheelchair and is   appearing very alert and talkative.  She does have pain with attempted range of   motion of the left upper or lower extremity, but pain does not seem to be as   severe as it was during her last visit with me.  LUNGS:  Show some coarse breath sounds that seem consistent with congestion, but   difficult to tell; breath sounds are coarse throughout the lung fields.  HEART:  Regular rate and rhythm; without arrhythmias, murmurs or gallops.  BREASTS:  On gross visualization are normal.  On palpation, no masses or   discharge from the nipples.  ABDOMEN:  Shows positive PEG tube in place without evidence of drainage,   tenderness or infection.  ABDOMEN:  Soft, nontender, otherwise.  EXTREMITIES:  Negative for edema.  SKIN:  Shows area of erythema.  Crack of buttocks and on left gluteal cheek, but   is healing well, seems to be from a prior abrasion; no active weeping.    WORKUP:  Review of lab work that was done prior showing hemoglobin A1c on August 1st excellent at 5.4, cholesterol at 211 with .  Prior to discharge from   the hospital, CBC showed H and H at 8 and 25%.  Renal function had improved   with BUN and creatinine at 66/1.9.  Potassium 4.7.  GFR 33, improved from 24.    ASSESSMENT AND PLAN:  1.   Status post episode of acute renal failure.  A.  Check BMP today, with phone review.  B.  If the patient still prerenal, we will consider discontinuation of   hydrochlorothiazide or decrease in dose, in light of patient being on both   hydrochlorothiazide at 25 mg daily and spironolactone to 50 mg daily.  2.  Anemia, probably nutritional plus or minus secondary to chronic renal   insufficiency versus other.  A.  Lab today to include CBC, vitamin B12 and ferritin.  3.  Cough, with the patient status post CVA, question how much of this could be   secondary to aspiration versus other.  Should rule out pneumonia.  A.  Chest x-ray today.  B.  Modified barium swallow study ordered.  C.  I have recommended use of home Ventolin neb treatments.  The patient already   has the machine.  She has been approved to use the albuterol at 2.5 mg per 3 mL   normal saline q.6-8 hours.  4.  Diabetes mellitus type 2, insulin-dependent, stable.  A.  Continue on Levemir 14 units daily.  5.  Essential hypertension, presently stable on Normodyne 100 mg 5 tabs t.i.d.,   hydralazine 100 mg t.i.d., hydrochlorothiazide 25 mg one a day, Norvasc 10 mg   one a day, and spironolactone 50 mg daily.  6.  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  A.  I have spoken to Dr. Galloway in [facility] Medicine [facility] and he will see Ms. Ayala today, most likely reinitiate low-dose dantrolene.  We will follow along.    ADDENDUM:  Plan to phone review lab, x-ray ASAP, and go from there.      FMS/HN  dd: 08/10/2017 11:56:25 (CDT)  td: 08/11/2017 02:05:34 (CDT)  Doc ID   #[id number]  Job ID #[id number]    CC:     This office note has been dictated.

## 2017-08-10 NOTE — TELEPHONE ENCOUNTER
Lab from the main campus called. Pt CBC needs to be re-collected, the purple stop specimen clotted.     Please advise thanks.

## 2017-08-10 NOTE — TELEPHONE ENCOUNTER
Spoke with Reina ruiz her Mom's test results(8/10): CXR-showed atelectasis in right mid lobe area without evidence of pneumonia; Labs-showed K+ up at 5.5, BUN/CReat up at 63/2.2(last at 66/1.9), Ferritin high/B-12 pending and CBC clotted.  I have recommended Ventolin Neb treatments 3x/day and D/C of Aldactone/spironolactone. We will do repeat lab x 1 week.  Tione, please call Sole with [facility] to schedule lab on  Mrs Perez in 1 week.  Thanks

## 2017-08-10 NOTE — TELEPHONE ENCOUNTER
FL MODIFIED BARIUM SSLP VIDEO SWALLOWWALLOW SPEECH STUDY    Called Dept/Speech...Only Dept can schedule L/M a detail message  for Yessenia to notified me/Aura or Reina-patient Daughter to schedule.

## 2017-08-10 NOTE — PATIENT INSTRUCTIONS
1.  Restart dantrolene.  Start at 25mg 3 times per day for the first 5 days then increase to 50mg 3 times per day.  If you don't have the 25mg capsule at home let me know and I will send a prescription to your pharmacy.  If this does not relieve your muscle tightness in the hand enough for you to wear the splint then let me know and I will schedule Botox injections for you.    2.  Stop gabapentin    3.  Start Lyrica for your pain as prescribed.  Let me know in about 5 days if it is helping or not so that I can increase the dose of the medicine if needed.

## 2017-08-10 NOTE — PROGRESS NOTES
"Subjective:       Patient ID: Lucy Perez is a 56 y.o. female.    Chief Complaint: No chief complaint on file.    This very pleasant 55yo BF is followed for:    -- central pain syndrome.  She had a R SAH requiring emergent hemicraniex on 8/13/16.  She received a PEG but has since been placed on a diet.  [place] [facility] [place] SNF for only 18 days where she did not progress much.   There are no records available from her hospitalization.  She is c/o left side pain as "stinging" and "electric shocks".  This occurs without movement but especially with PROM of the arm/leg.  Another MD increased gabapentin from 100mg TID to 300mg TID but this made her too sleepy and was changed to 300mg Q HS only.  I started carbamazepine 200mg BID at the last visit on 10/18/16 but her daughter reports today that it was no help and was stopped.      -- spasticity involving primarily the left hand.  It is difficult to extend the fingers of the left hand due to pain.  It is not clear if this is pain of spasticity or due to the central pain syndrome.  I started dantrolene 25mg TID and increased to 50mg TID but she was hospitalized with HTN which was attributed to dantrolene (?) and it was stopped before she could take the 50mg dose.  She recently had HH PT/OT restarted   She is able to answer my questions mostly appropriately but does appear to have some cognitive deficits.  Her family is very involved in her care.                  Review of Systems   Constitutional: Negative for fatigue.   Eyes: Negative for visual disturbance.   Respiratory: Negative for shortness of breath.    Cardiovascular: Negative for chest pain and leg swelling.   Gastrointestinal: Negative for constipation.   Genitourinary: Negative for difficulty urinating, dysuria, frequency and urgency.   Musculoskeletal: Positive for gait problem. Negative for arthralgias, back pain, joint swelling, myalgias, neck pain and neck stiffness.   Neurological: " Positive for speech difficulty, weakness and numbness. Negative for tremors.   Psychiatric/Behavioral: Negative for dysphoric mood and sleep disturbance. The patient is not nervous/anxious.        Objective:      Physical Exam   Constitutional: She is oriented to person, place, and time. She appears well-nourished.   Eyes: EOM are normal. Pupils are equal, round, and reactive to light.   Neck: Normal range of motion. Neck supple.   Cardiovascular: Normal rate.    Pulmonary/Chest: Effort normal.   Musculoskeletal:   RUE/RLE AROM WNL  LUE/LLE with no AROM   Neurological: She is oriented to person, place, and time.   RUE/RLE 5/5  LUE 0/5  LLE 1/5 HF, HE, 0/5 KF/KE/DF/PF   Skin: No rash noted.   Psychiatric: She has a normal mood and affect.       Assessment:       1. Left spastic hemiparesis -- I have restarted dantrolene 25mg TID and asked her daughter to increase this to 50mg TID after 5 days (wrote instructions).  She has a resting hand splint which she cannot wear due to the tone.  I explained that the objective is to relieve the spasticity enough that the pt can don the splint for extended periods which would likely help with her tone.      If dantrolene is not helpful with this her daughter will let me know and I will refer her to Dr. Pardo for Botox injections.      If the pt's pain can be controlled I think it would be reasonable to admit her to IPR for a trial.  She has very little motor return to the left side but has not been able to try to develop this at all due to pain with passive movement of the arm and leg.       2. Central pain syndrome -- I have asked the pt to stop gabapentin.  I have rx Lyrica 75mg BID and made it clear that it may need to be increased.  Her daughter will let me know in about a week how she is doing with it to determine if the dose needs to be increased.        3. 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       4. Type 2 diabetes mellitus with  stage 3 chronic kidney disease, without long-term current use of insulin -- controlled acc to pt/family     5. CKD (chronic kidney disease) stage 4, GFR 15-29 ml/min -- stable       Plan:       RTC 3 mos, 40 mins, to see Dr. Pardo -- sooner if Botox is needed.  More than 25 mins was spent with the patient with more than half that time used to discuss the pt's diagnoses, interventions and treatment plan.

## 2017-08-13 NOTE — PHYSICIAN QUERY
PT Name: Lucy Perez  MR #: [id number]     Physician Query Form - Documentation Clarification      CDS/: Karolina Rodriguez               Contact information: [email or web address]    This form is a permanent document in the medical record.     Query Date: August 13, 2017    By submitting this query, we are merely seeking further clarification of documentation. Please utilize your independent clinical judgment when addressing the question(s) below.    The Medical record reflects the following:    Supporting Clinical Findings Location in Medical Record      Diabetes mellitus uncontrolled    Glucose:  8-2 145; 142; 131; 141                   8-3 122; 90                   8-4  133;        H&P     Lab results      Type 2 diabetes is controlled on discharge       Discharge summary                                                                            Doctor, Please specify diagnosis or diagnoses associated with above clinical findings.    Provider Use Only      [  ]  DM type 2 hyperglycemia      [  ]  DM type 2, hypoglycemia      [ X ]  DM type 2 controlled      [  ]  Other, please specify      [  ]  [  ] Clinically undetermined

## 2017-08-14 ENCOUNTER — OUTPATIENT CASE MANAGEMENT (OUTPATIENT)
Dept: ADMINISTRATIVE | Facility: OTHER | Age: 57
End: 2017-08-14

## 2017-08-14 NOTE — PROGRESS NOTES
8/14/2017  1043  --First Attempt to complete initial assessment for Outpatient Care Management; Per spouse patient is currently napping, requesting return call after 1pm today.  KARINA Pickett    8/14/2017  1524  Per spouse, patient is not interested at this time.  Is agreeable to received pamphlet and my business card, encouraged to call if she changes her mind.  KARINA Pickett

## 2017-08-14 NOTE — PROGRESS NOTES
Patient, Lucy Perez (MRN #[id number]), presented with a recent Estimated Glumerular Filtration Rate (EGFR) between 15 and 29 consistent with the definition of chronic kidney disease stage 4 (ICD10 - N18.4).    eGFR if    Date Value Ref Range Status   08/10/2017 27.7 (A) >60 mL/min/1.73 m^2 Final       The patient's chronic kidney disease stage 4 was monitored, evaluated, addressed and/or treated. This addendum to the medical record is made on 08/14/2017.

## 2017-08-16 DIAGNOSIS — R09.89 CHEST CONGESTION: ICD-10-CM

## 2017-08-16 RX ORDER — ALBUTEROL SULFATE 0.83 MG/ML
2.5 SOLUTION RESPIRATORY (INHALATION) EVERY 6 HOURS PRN
Qty: 25 EACH | Refills: 3 | Status: SHIPPED | OUTPATIENT
Start: 2017-08-16 | End: 2019-06-25 | Stop reason: SDUPTHER

## 2017-08-16 NOTE — TELEPHONE ENCOUNTER
----- Message from Vesna Johnson sent at 8/16/2017  2:51 PM CDT -----  Contact: Self/ [phone]   Type: Rx    Name of medication(s): albuterol (PROVENTIL) 2.5 mg /3 mL (0.083 %) nebulizer solution    Is this a refill? New rx? Refill     Who prescribed medication? Dr. Max     Pharmacy Name, Phone, & Location: [facility] on file     Comments: pt is calling to have refill on the Rx above. Please call and advise     Thank you

## 2017-08-17 ENCOUNTER — TELEPHONE (OUTPATIENT)
Dept: INTERNAL MEDICINE | Facility: CLINIC | Age: 57
End: 2017-08-17

## 2017-08-17 NOTE — TELEPHONE ENCOUNTER
----- Message from Rahul Stanton MA sent at 8/17/2017 10:44 AM CDT -----  Contact: Riddhi spence/[facility] - [phone]   Requesting Orders to continue Home Health Occupational Therapy. Verbal is ok. Please call. Thanks!

## 2017-08-18 NOTE — TELEPHONE ENCOUNTER
----- Message from Celestine Montalvo sent at 8/18/2017  4:08 PM CDT -----  Contact: Reina/ Daughter/ [phone] cell  Pt's daughter is calling to speak with Dr. Max to discuss some symptoms the pt is experiencing.  The pt's kidney function improved after being taken off of medication spironolactone (ALDACTONE) 50 MG tablet (daughter isn't sure if it's the correct medication name), but now the pt's bathroom visits have shortened and Ms. Huang thinks that something is going on with her kidneys again.  She would like to speak with someone in the office to discuss as soon as possible.  Please call and advise.    Thank you

## 2017-08-18 NOTE — TELEPHONE ENCOUNTER
Spoke with Reina who states that her mom is doing well off the spironolactone with BPs: 122/74,130/80, and 142/74. She was supposed to have lab today with  but did not.  Alesia, please call Sole with [facility] to make sure pt gets her lab(BMP,CBC) done between today and tomorrow(Monday and Tuesday).  Thanks

## 2017-08-21 NOTE — TELEPHONE ENCOUNTER
Called and spoke with madhuri  with [facility] gave VO for pt to have CBC and BMP drawn either today or jimenez

## 2017-08-22 RX ORDER — LISINOPRIL 10 MG/1
TABLET ORAL
Qty: 90 TABLET | Refills: 2 | Status: SHIPPED | OUTPATIENT
Start: 2017-08-22 | End: 2017-09-18

## 2017-08-22 NOTE — PROGRESS NOTES
"Spoke w spouse re: stroke progress since discharge from inpatient status.  States client is "doing better" but still voiced concerns over what seems to be seizure activity. Has upcoming appt w/ PCP.  Education provided on goal of stroke mobile, s/s of stroke, diet, PROM exercises to LLE/LUE.  Spouse also reports his consistent monitoring of client's blood sugar and blood pressure--along w/ logging results.  Verbalized understanding of instructions provided.  Encouraged to utilize stroke team for any concern.  "

## 2017-08-23 ENCOUNTER — TELEPHONE (OUTPATIENT)
Dept: INTERNAL MEDICINE | Facility: CLINIC | Age: 57
End: 2017-08-23

## 2017-08-23 DIAGNOSIS — E46 PROTEIN-CALORIE MALNUTRITION: ICD-10-CM

## 2017-08-23 DIAGNOSIS — N18.4 CRI (CHRONIC RENAL INSUFFICIENCY), STAGE 4 (SEVERE): ICD-10-CM

## 2017-08-23 DIAGNOSIS — D64.9 ANEMIA, UNSPECIFIED TYPE: Primary | ICD-10-CM

## 2017-08-23 RX ORDER — FERROUS SULFATE 220 (44)/5
SOLUTION, ORAL ORAL
Qty: 300 ML | Refills: 6 | Status: SHIPPED | OUTPATIENT
Start: 2017-08-23 | End: 2018-07-25 | Stop reason: SDUPTHER

## 2017-08-23 NOTE — TELEPHONE ENCOUNTER
Spoke with Reina ruiz her mom's labs(8/22)-showed K+ WNL,BUN/Creat improved at 67/2.1(last at 63/2.2), and GFR up to 29; H/H low at 7.3/23%.  Pt has no N/V, no BPR, no Abdominal pain.  I have recommended start of Liquid Iron daily and will do recheck lab in 2-3 weeks.  I have also recommended pt keep appt for Modified Barium swallow study.  Tione, please call Sole with [facility] to schedule Home labs on pt in 2 weeks.  Thanks

## 2017-08-23 NOTE — TELEPHONE ENCOUNTER
----- Message from Mahnaz Evans sent at 8/23/2017  9:48 AM CDT -----  Contact: daughter/ Reina/ [phone]  Patient would like to get test results.  Name of test (lab, mammo, etc.):  lab  Date of test:  8/22/17  Ordering provider:   Where was the test performed:    Comments:  Daughter would like to know if the patient is still to do her swallow test that is scheduled on 8/29/17 because patient is being d/c from speech therapy, please call and advise.

## 2017-08-31 ENCOUNTER — TELEPHONE (OUTPATIENT)
Dept: INTERNAL MEDICINE | Facility: CLINIC | Age: 57
End: 2017-08-31

## 2017-08-31 NOTE — TELEPHONE ENCOUNTER
----- Message from Rambo Burnett sent at 8/31/2017  2:20 PM CDT -----  Contact: Pam [facility] [phone]  Type: Orders Request    What orders/ testing are being requested?  PT Evaluate & treat    Is there a future appointment scheduled for the patient with PCP? No    Comments: Advice    Thanks

## 2017-08-31 NOTE — TELEPHONE ENCOUNTER
Alesia, please call Pam and give a verbal ok to restart HH PT/OT. Also, I should see pt in 4 months with 1 week prior lab.  Would you schedule the 4 month EP appt. Thanks

## 2017-09-01 NOTE — TELEPHONE ENCOUNTER
Called and gave verbal to add HH orders and scheduled pt for 4 months and prior labs but need new ferritin and iron and TIBC

## 2017-09-05 ENCOUNTER — TELEPHONE (OUTPATIENT)
Dept: INTERNAL MEDICINE | Facility: CLINIC | Age: 57
End: 2017-09-05

## 2017-09-05 DIAGNOSIS — D64.9 ANEMIA, UNSPECIFIED TYPE: Primary | ICD-10-CM

## 2017-09-06 NOTE — TELEPHONE ENCOUNTER
----- Message from Shabnam Reeder sent at 9/6/2017 12:50 PM CDT -----  Contact: Deepthi/[phone]  Type: Home Health (orders, updates, clarifications, etc.)    Home Health Agency/Nurse:[facility]    Phone [phone]    Reason for call:problem getting labs    Comments:Jazmin said that she is calling in regards to she was unable to get labs on pt today but she will schedule a visit for another nurse to go out tomorrow and try. Please call and advise      Thanks!!!

## 2017-09-07 ENCOUNTER — LAB VISIT (OUTPATIENT)
Dept: LAB | Facility: HOSPITAL | Age: 57
End: 2017-09-07
Attending: INTERNAL MEDICINE
Payer: MEDICARE

## 2017-09-07 DIAGNOSIS — I15.0 SECONDARY RENOVASCULAR HYPERTENSION, MALIGNANT: Primary | ICD-10-CM

## 2017-09-07 DIAGNOSIS — D64.9 ANEMIA: ICD-10-CM

## 2017-09-07 DIAGNOSIS — E53.8 B12 DEFICIENCY: ICD-10-CM

## 2017-09-07 LAB
BASOPHILS # BLD AUTO: 0.03 K/UL
BASOPHILS NFR BLD: 0.7 %
DIFFERENTIAL METHOD: ABNORMAL
EOSINOPHIL # BLD AUTO: 0.2 K/UL
EOSINOPHIL NFR BLD: 5.2 %
ERYTHROCYTE [DISTWIDTH] IN BLOOD BY AUTOMATED COUNT: 14.5 %
FERRITIN SERPL-MCNC: 304 NG/ML
FOLATE SERPL-MCNC: 3.4 NG/ML
HCT VFR BLD AUTO: 22.5 %
HGB BLD-MCNC: 7.2 G/DL
IRON SERPL-MCNC: 60 UG/DL
LYMPHOCYTES # BLD AUTO: 0.8 K/UL
LYMPHOCYTES NFR BLD: 16.8 %
MCH RBC QN AUTO: 29.8 PG
MCHC RBC AUTO-ENTMCNC: 32 G/DL
MCV RBC AUTO: 93 FL
MONOCYTES # BLD AUTO: 0.3 K/UL
MONOCYTES NFR BLD: 7.2 %
NEUTROPHILS # BLD AUTO: 3.2 K/UL
NEUTROPHILS NFR BLD: 70.1 %
PLATELET # BLD AUTO: 149 K/UL
PMV BLD AUTO: 11.4 FL
RBC # BLD AUTO: 2.42 M/UL
RETICS/RBC NFR AUTO: 1.7 %
SATURATED IRON: 45 %
TOTAL IRON BINDING CAPACITY: 133 UG/DL
TRANSFERRIN SERPL-MCNC: 90 MG/DL
VIT B12 SERPL-MCNC: 646 PG/ML
WBC # BLD AUTO: 4.58 K/UL

## 2017-09-07 PROCEDURE — 82728 ASSAY OF FERRITIN: CPT

## 2017-09-07 PROCEDURE — 82607 VITAMIN B-12: CPT

## 2017-09-07 PROCEDURE — 85025 COMPLETE CBC W/AUTO DIFF WBC: CPT

## 2017-09-07 PROCEDURE — 82746 ASSAY OF FOLIC ACID SERUM: CPT

## 2017-09-07 PROCEDURE — 85045 AUTOMATED RETICULOCYTE COUNT: CPT

## 2017-09-07 PROCEDURE — 83540 ASSAY OF IRON: CPT

## 2017-09-08 ENCOUNTER — TELEPHONE (OUTPATIENT)
Dept: PHYSICAL MEDICINE AND REHAB | Facility: HOSPITAL | Age: 57
End: 2017-09-08

## 2017-09-08 NOTE — TELEPHONE ENCOUNTER
----- Message from Renay Berkowitz MA sent at 9/8/2017  2:39 PM CDT -----  Contact: Reina (daughter) @ [phone]      ----- Message -----  From: Jesus Thibodeaux  Sent: 9/8/2017   2:28 PM  To: Laverne HANSEN Staff    Reina is calling to update the doctor on pt's status while taking pregabalin (LYRICA) 75 MG capsule. Reina says the medication is making the pt drowsy, to where she's sleeping the whole day. Reina would like to someone to call her back.

## 2017-09-11 VITALS
HEART RATE: 74 BPM | SYSTOLIC BLOOD PRESSURE: 132 MMHG | OXYGEN SATURATION: 97 % | DIASTOLIC BLOOD PRESSURE: 68 MMHG | RESPIRATION RATE: 20 BRPM

## 2017-09-11 NOTE — PROGRESS NOTES
AAO to person/place.  Denies discomfort.  Bedbound.  Lives w/ spouse who was present for and engaged in visit.  NIHSS-3; does not smoke; refrains from salty and fast food and no salt added to food; spouse assesses blood sugar and blood pressure daily and logs results, HH for ST 2x/wk.  o2 available at 2l/nc. Peg in situ w/ intermittent pleasure feedings. Education provided on goal of stroke mobile, s/s of stroke, diet, PROM exercises, medications.  Spouse verbalizes understanding. Encouraged to utilize stroke team for any concerns.

## 2017-09-13 ENCOUNTER — TELEPHONE (OUTPATIENT)
Dept: INTERNAL MEDICINE | Facility: CLINIC | Age: 57
End: 2017-09-13

## 2017-09-13 NOTE — TELEPHONE ENCOUNTER
----- Message from Aura Marquez sent at 9/13/2017 12:18 PM CDT -----  Contact: Vivian with [facility] [phone]  Being is being discharged today from nursing only. Physical and Occupational Therapy will remain in home.

## 2017-09-14 ENCOUNTER — TELEPHONE (OUTPATIENT)
Dept: INTERNAL MEDICINE | Facility: CLINIC | Age: 57
End: 2017-09-14

## 2017-09-14 DIAGNOSIS — E53.8 FOLATE DEFICIENCY: ICD-10-CM

## 2017-09-14 DIAGNOSIS — N18.4 CRI (CHRONIC RENAL INSUFFICIENCY), STAGE 4 (SEVERE): ICD-10-CM

## 2017-09-14 DIAGNOSIS — D64.9 ANEMIA, UNSPECIFIED TYPE: Primary | ICD-10-CM

## 2017-09-14 RX ORDER — FOLIC ACID 1 MG/1
1 TABLET ORAL DAILY
Qty: 90 TABLET | Refills: 3 | Status: SHIPPED | OUTPATIENT
Start: 2017-09-14 | End: 2018-07-25 | Stop reason: SDUPTHER

## 2017-09-14 NOTE — TELEPHONE ENCOUNTER
Spoke with pt's daughter, Reina: Review of Lucy's lab(9/7) showed H/H down to 7/22% and Plts low at 149, with Ferritin/B-12/Iron studies ok; Folate low at 3.4.  Lucy has no N/V, no abdomen pain, no evidence of GI hemorrhage. The anemia is insidius over the last 1-2 months.  She had similar drop in Hct and was transfused in the hospital in June. A/P #1-Acute on Chronic anemia with pt clinically stable a)Heme-Onc Appt +/-transfusion, b)Check FIT, c) Nephrology appt to be scheduled as this could be associated with CRI Stage 4, d) Folic Acid 1mg QD.   Tione, please call Sole with Home Health to restart HH which was D/C'd yesterday. I will need the nurse to do a stool sample and lab once we hear from Heme-Onc about appt. Please also schedule a next available appt/Nephrology/Dr Rios.  Thanks

## 2017-09-15 ENCOUNTER — TELEPHONE (OUTPATIENT)
Dept: HEMATOLOGY/ONCOLOGY | Facility: CLINIC | Age: 57
End: 2017-09-15

## 2017-09-15 ENCOUNTER — TELEPHONE (OUTPATIENT)
Dept: INTERNAL MEDICINE | Facility: CLINIC | Age: 57
End: 2017-09-15

## 2017-09-15 ENCOUNTER — TELEPHONE (OUTPATIENT)
Dept: PHYSICAL MEDICINE AND REHAB | Facility: HOSPITAL | Age: 57
End: 2017-09-15

## 2017-09-15 DIAGNOSIS — D64.9 ANEMIA, UNSPECIFIED TYPE: Primary | ICD-10-CM

## 2017-09-15 RX ORDER — OXCARBAZEPINE 150 MG/1
150 TABLET, FILM COATED ORAL 2 TIMES DAILY
Qty: 60 TABLET | Refills: 11 | Status: SHIPPED | OUTPATIENT
Start: 2017-09-15 | End: 2018-01-03

## 2017-09-15 RX ORDER — OXCARBAZEPINE 150 MG/1
150 TABLET, FILM COATED ORAL 2 TIMES DAILY
Qty: 60 TABLET | Refills: 11 | Status: SHIPPED | OUTPATIENT
Start: 2017-09-15 | End: 2017-09-15 | Stop reason: SDUPTHER

## 2017-09-15 NOTE — TELEPHONE ENCOUNTER
Anaid, pt is seeing Heme-Onc Monday-thank you.  Would you please call Sole with [facility] about restarting pt's Home Health Nursing,given her severe anemia.  Also, could the  nurse deliver a Fit kit to pt for a stool for blood or do they need to do a Stool cup?

## 2017-09-15 NOTE — TELEPHONE ENCOUNTER
Spoke with Sole , and confirmed the HH. Scheduled pt with NEPHROLOGY and mailed to pt home address.

## 2017-09-15 NOTE — PROGRESS NOTES
HEMATOLOGIC MALIGNANCIES CONSULT NOTE    IDENTIFYING STATEMENT   Lucy Perez (Lucy) is a 57 y.o. female with a  of 1960 from [place], referred by Dr. Beverly Muniz for evaluation of anemia.     HISTORY OF PRESENT ILLNESS:      Ms. Perez is 57 and has Stage IV chronic kidney disease, sarcoidosis, history of R-MCA stroke with R putaminal hemorrhagic transformation s/p hemicraniotomy (residual L hemiparesis), s/p PEG and presents for evaluation of anemia.    Notably, Ms. Perez does not have a normal hemoglobin on record at [facility]. First available hemoglobin is from 2013, when it was 10.7 g/dl. MCV was normal (93), as was the remainder of her CBC. The highest past hemoglobin on recorfd is from 9/3/2015, when it was 11 g/dl.     Most recently, she was admitted to the hospital from 17 - 17 for management of acute kidney injury. During hospital follow-up on 17, she had hemoglobin of 7.3 g/dl with otherwise normal CBC. Follow-up CBC obtained on 17 showed Hgb 7.2 (MCV 93), WBC 4.6, Plt 149. Additional labs showed normal iron studies and Vitmain B12, though folate was low (3.6). Folic acid was previously normal on 16.     Given the worsening anemia and thrombocytopenia, she is referred to hematology for evaluation.     Since her referral, Dr. Max has prescribed folic acid. As this will be delivered by mail, she has not yet obtained it to start.     Ms. Perez reports feeling okay. She denies any dizziness, lightheadedness, or syncope. No palpitations or chest pain. No shortness of breath.     Past Medical History:   Diagnosis Date    Anemia in stage 3 chronic kidney disease 2017    Anemia of chronic disease 6/10/2017    Anxiety     Arthritis of right acromioclavicular joint 2014    Asthma     Bipolar disorder     Bronchitis, acute     Cataract     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  10/22/2016    Cortical cataract of both eyes 7/26/2016    Degeneration of lumbar or lumbosacral intervertebral disc 3/5/2013    S/p MRI L-spine 5/2009     Depression     General anesthetics causing adverse effect in therapeutic use     Hemorrhagic cerebrovascular accident (CVA)     [place] [facility] [place]    History of stroke 6/28/2017    s/p R-MCA stroke with R-putaminal hemorrhagic transformation in 8/2016 and 11/2016 ([place] [facility] [place] with residual L hemiparesis, on AED s/p CVA      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    Hyperlipidemia     Hypertensive retinopathy of both eyes 7/26/2016    Impingement syndrome of right shoulder 7/2/2014    Left ventricular diastolic dysfunction with preserved systolic function 12/15/2015    Mixed anxiety and depressive disorder 3/5/2013    Obesity     THERESA (obstructive sleep apnea) 3/5/2013    No Home CPAP 2ndary to cost     Partial symptomatic epilepsy with complex partial seizures, not intractable, without status epilepticus     PEG (percutaneous endoscopic gastrostomy) status 6/28/2017    Renovascular hypertension 3/5/2013    Will resume home medications: amlodipine, labetalol, and hydralazine Will hold Lisinopril in setting of DARLING.   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, without long-term current use of insulin 8/2/2017    Type 2 diabetes mellitus with diabetic polyneuropathy, without long-term current use of insulin 10/22/2015    Type 2 diabetes mellitus with stage 3 chronic kidney disease, without long-term current use of insulin 10/22/2015    Vertebral artery stenosis 3/5/2013    S/p Stenting per Dr Burnett   "      Family History   Problem Relation Age of Onset    Cancer Mother 55     Breast cancer    Diabetes Mother     Hypertension Mother     Heart disease Mother     Heart attack Mother     Stroke Sister     Hypertension Sister     Sleep apnea Sister     No Known Problems Daughter     No Known Problems Son     Bell's palsy Sister     Lupus Sister     Blindness Maternal Grandmother     Diabetes       "My entire family family has diabetes"    Stroke Maternal Aunt     Amblyopia Neg Hx     Cataracts Neg Hx     Glaucoma Neg Hx     Macular degeneration Neg Hx     Retinal detachment Neg Hx     Strabismus Neg Hx        Social History     Social History    Marital status:      Spouse name: N/A    Number of children: N/A    Years of education: N/A     Occupational History    Not on file.     Social History Main Topics    Smoking status: Never Smoker    Smokeless tobacco: Never Used    Alcohol use No      Comment: occasional wine cooler     Drug use: No    Sexual activity: Yes     Partners: Male     Other Topics Concern    Not on file     Social History Narrative    . 2 children. Does not work. Previously worked as a .          MEDICATIONS:     Prior to Admission medications    Medication Sig Start Date End Date Taking? Authorizing Provider   albuterol (PROVENTIL) 2.5 mg /3 mL (0.083 %) nebulizer solution Take 3 mLs (2.5 mg total) by nebulization every 6 (six) hours as needed for Wheezing. Rescue 8/16/17 8/16/18  Beverly Muniz MD   alprazolam (XANAX) 0.5 MG tablet 1/2 to 1 tab Q8 hours as needed for anxiety 6/23/17   Beverly Muniz MD   amlodipine (NORVASC) 10 MG tablet Take 1 tablet (10 mg total) by mouth once daily. 10/18/16 10/18/17  Beverly Muniz MD   aspirin (ECOTRIN) 81 MG EC tablet Take 81 mg by mouth once daily.      Historical Provider, MD   atorvastatin (LIPITOR) 40 MG tablet Take 1 tablet (40 mg total) by mouth once daily. For Cholesterol " "10/20/16   Beverly Muniz MD   BD INSULIN PEN NEEDLE UF SHORT 31 gauge x 5/16" Ndle USE TO INJECT NOVOLOG FLEXPEN BEFORE MEALS 5/29/17   Beatriz Aguilera DNP, NP   blood sugar diagnostic (ACCU-CHEK SMARTVIEW TEST STRIP) Strp 1 strip by Misc.(Non-Drug; Combo Route) route 2 (two) times daily. 11/5/15   Beatriz Aguilera DNP, NP   clotrimazole-betamethasone 1-0.05% (LOTRISONE) cream Apply topically 2 (two) times daily. Apply to area of rash/iting on buttocks 1-2x/day as needed 8/10/17   Beverly Muniz MD   colchicine 0.6 mg tablet 1 tab daily as needed for gout flare 6/16/16   Beverly Muniz MD   famotidine (PEPCID) 20 MG tablet TAKE 1 TABLET (20 MG TOTAL) BY MOUTH ONCE DAILY. 5/11/17   Beverly Muniz MD   ferrous sulfate 220 mg (44 mg iron)/5 mL solution 10ml daily for Iron/Give via PEG 8/23/17   Beverly Muniz MD   fluoxetine 20 MG tablet TAKE 1 TABLET (20 MG TOTAL) BY MOUTH ONCE DAILY. 7/17/17   Beverly Muniz MD   folic acid (FOLVITE) 1 MG tablet Take 1 tablet (1 mg total) by mouth once daily. 9/14/17 9/14/18  Beverly Muniz MD   gabapentin (NEURONTIN) 300 MG capsule 1 cap at Bedtime  Patient taking differently: Take 300 mg by mouth every evening.  7/12/17   Beverly Muniz MD   hydrALAZINE (APRESOLINE) 100 MG tablet Take 1 tablet (100 mg total) by mouth 3 (three) times daily. 2/22/17   Beverly Muniz MD   hydrochlorothiazide (HYDRODIURIL) 25 MG tablet Take 25 mg by mouth once daily.    Historical Provider, MD   insulin detemir (LEVEMIR FLEXTOUCH) 100 unit/mL (3 mL) SubQ InPn pen 14 units in AM  Patient taking differently: Inject 14 Units into the skin every evening.  7/12/17   Beverly Muniz MD   labetalol (NORMODYNE) 100 MG tablet Take 500 mg by mouth every 8 (eight) hours.    Historical Provider, MD   levetiracetam (KEPPRA) 500 MG Tab Take 1 tablet (500 mg total) by mouth every 8 (eight) hours. 8/10/17 8/10/18  Beverly Muniz MD   lisinopril 10 MG tablet TAKE 1 TABLET (10 MG " "TOTAL) BY MOUTH ONCE DAILY. 8/22/17   Beverly Muniz MD   multivitamin with iron Tab 1/day  Patient taking differently: Take 1 tablet by mouth once daily.  6/20/16   Beverly Muniz MD   nut.tx.gluc.intol,lac-free,soy (GLUCERNA 1.5 NOAH) Liqd 1.5 cans in AM, 1 can at Noon , 1.5cans at Dinner, and 1 can before Bed/Total 5 cans daily 11/25/16   Beverly Muniz MD   nystatin (MYCOSTATIN) powder Apply topically 2 (two) times daily. Apply to irritated skin 2x/day as needed 8/10/17   Beverly Muniz MD   phenytoin (DILANTIN) 300 MG ER capsule Take 300 mg by mouth every evening.    Historical Provider, MD   pregabalin (LYRICA) 75 MG capsule Take 1 capsule (75 mg total) by mouth 2 (two) times daily. 8/10/17 2/8/18  Pola Galloway MD       ALLERGIES:   Review of patient's allergies indicates:   Allergen Reactions    Lisinopril Other (See Comments)     Angioedema      Vicodin [hydrocodone-acetaminophen] Rash        ROS:       Review of Systems   Constitutional: Negative for diaphoresis, fatigue, fever and unexpected weight change.   HENT:   Negative for lump/mass and sore throat.    Eyes: Negative for icterus.   Respiratory: Negative for cough and shortness of breath.    Cardiovascular: Negative for chest pain and palpitations.   Gastrointestinal: Negative for abdominal distention, constipation, diarrhea, nausea and vomiting.   Genitourinary: Negative for dysuria and frequency.    Musculoskeletal: Negative for arthralgias and myalgias.   Skin: Negative for rash.   Neurological: Negative for dizziness and headaches.   Hematological: Negative for adenopathy. Does not bruise/bleed easily.   Psychiatric/Behavioral: The patient is not nervous/anxious.        PHYSICAL EXAM:  Vitals:    09/18/17 1340   BP: (!) 154/67   Pulse: 82   Resp: 18   SpO2: 96%   Height: 5' 3" (1.6 m)       Physical Exam   Constitutional: She is oriented to person, place, and time. She appears well-developed and well-nourished. No distress. "   She is in a wheelchair.    HENT:   Head: Normocephalic and atraumatic.   Mouth/Throat: Mucous membranes are normal. No oral lesions.   She is s/p craniotomy.    Eyes:   Mild conjunctival pallor   Neck: No thyromegaly present.   Cardiovascular: Normal rate, regular rhythm and normal heart sounds.    No murmur heard.  Pulmonary/Chest: Breath sounds normal. She has no wheezes. She has no rales.   Abdominal: Soft. She exhibits no distension and no mass. There is no splenomegaly or hepatomegaly. There is no tenderness.   Lymphadenopathy:     She has no cervical adenopathy.        Right cervical: No deep cervical adenopathy present.       Left cervical: No deep cervical adenopathy present.     She has no axillary adenopathy.        Right: No inguinal adenopathy present.        Left: No inguinal adenopathy present.   Neurological: She is alert and oriented to person, place, and time. She displays abnormal reflex. No cranial nerve deficit. Coordination normal.   She has marked weakness on the left compared with the right. Hyperreflexive on the left.    Skin: No rash noted.        LAB:   Results for orders placed or performed in visit on 09/18/17   CBC auto differential   Result Value Ref Range    WBC 4.99 3.90 - 12.70 K/uL    RBC 2.41 (L) 4.00 - 5.40 M/uL    Hemoglobin 7.6 (L) 12.0 - 16.0 g/dL    Hematocrit 22.8 (L) 37.0 - 48.5 %    MCV 95 82 - 98 fL    MCH 31.5 (H) 27.0 - 31.0 pg    MCHC 33.3 32.0 - 36.0 g/dL    RDW 13.5 11.5 - 14.5 %    Platelets 247 150 - 350 K/uL    MPV 9.9 9.2 - 12.9 fL    Gran # 3.4 1.8 - 7.7 K/uL    Lymph # 1.1 1.0 - 4.8 K/uL    Mono # 0.3 0.3 - 1.0 K/uL    Eos # 0.3 0.0 - 0.5 K/uL    Baso # 0.02 0.00 - 0.20 K/uL    Gran% 67.2 38.0 - 73.0 %    Lymph% 21.2 18.0 - 48.0 %    Mono% 5.8 4.0 - 15.0 %    Eosinophil% 5.2 0.0 - 8.0 %    Basophil% 0.4 0.0 - 1.9 %    Differential Method Automated    Comprehensive metabolic panel   Result Value Ref Range    Sodium 142 136 - 145 mmol/L    Potassium 4.6 3.5 -  5.1 mmol/L    Chloride 113 (H) 95 - 110 mmol/L    CO2 19 (L) 23 - 29 mmol/L    Glucose 149 (H) 70 - 110 mg/dL    BUN, Bld 45 (H) 6 - 20 mg/dL    Creatinine 1.9 (H) 0.5 - 1.4 mg/dL    Calcium 9.1 8.7 - 10.5 mg/dL    Total Protein 7.0 6.0 - 8.4 g/dL    Albumin 3.1 (L) 3.5 - 5.2 g/dL    Total Bilirubin 0.2 0.1 - 1.0 mg/dL    Alkaline Phosphatase 155 (H) 55 - 135 U/L    AST 22 10 - 40 U/L    ALT 14 10 - 44 U/L    Anion Gap 10 8 - 16 mmol/L    eGFR if African American 32.8 (A) >60 mL/min/1.73 m^2    eGFR if non  28.4 (A) >60 mL/min/1.73 m^2   C-REACTIVE PROTEIN   Result Value Ref Range    CRP 25.2 (H) 0.0 - 8.2 mg/L   Type & Screen   Result Value Ref Range    Group & Rh O POS     Indirect Cha NEG        PROBLEMS ASSESSED THIS VISIT:    1. Anemia in stage 3 chronic kidney disease    2. Thrombocytopenia        IMPRESSION:    1. Anemia, likely multifactorial   A.  No normal hemoglobin on record. First valuve is from 5/1/13 (10.7 g/dl).    B. 8/22/17: Hospital follow-up CBC - hgb 7.3. WBC and plt normal. MCV 96.   C. 9/7/17: Hgb 7.2. Plt 149. Retic 1.7%. Iron studies normal. Folic acid low (3.4). B12 normal.     2. History of R-MCA stroke with R putaminal hemorrhagic transformation and residual L-sided deficits.  3. S/p PEG  4. Chronic kidney disease, Stage IV  5. Sarcoidosis    PLAN:        Thrombocytopenia  New problem as of 9/7/17. We will correct the folate deficiency. If her thrombocytopenia does not improve, we will consider other causes.       Anemia in stage 3 chronic kidney disease  Ms. Perez has a chronic anemia that is currently hypoproliferative (supported by low reticulocyte production). I suspect that the cause of her anemia is multifactorial. She has advanced chronic kidney disease (nephrology referral made by PCP), a chronic inflammatory condition (sarcoidosis), and recently has developed a folate deficiency. Additionally, she has never actually had a normal CBC in our medical record,  so an underlying hemoglobinopathy should also be ruled out.     We will obtain the following tests today:   - CBC - results show hgb 7.6. No immediate need for transfusion.    - CMP   - ESR, CRP  - CRP elevated, consistent with chronic inflammatory disease.    - Erythropoietin level   - Hemoglobin electrophoresis    For management of her anemia, we will initially want to correct the folate deficiency and monitor for improvement. Because of her chronic kidney disease, she will likely need an CHICHI, but we will re-evaluate this upon repletion of her folate.    She will return to clinic in three weeks for monitoring, particularly for blood transfusion needs. If her folate is replete at that visit, we will address the need for an CHICHI such as darbepoietin.       Mike Owen MD  Hematology/Oncology and BMT

## 2017-09-18 ENCOUNTER — LAB VISIT (OUTPATIENT)
Dept: LAB | Facility: HOSPITAL | Age: 57
End: 2017-09-18
Attending: INTERNAL MEDICINE
Payer: MEDICARE

## 2017-09-18 ENCOUNTER — INITIAL CONSULT (OUTPATIENT)
Dept: HEMATOLOGY/ONCOLOGY | Facility: CLINIC | Age: 57
End: 2017-09-18
Payer: MEDICARE

## 2017-09-18 VITALS
SYSTOLIC BLOOD PRESSURE: 154 MMHG | DIASTOLIC BLOOD PRESSURE: 67 MMHG | HEART RATE: 82 BPM | RESPIRATION RATE: 18 BRPM | OXYGEN SATURATION: 96 % | HEIGHT: 63 IN

## 2017-09-18 DIAGNOSIS — D64.9 ANEMIA, UNSPECIFIED TYPE: ICD-10-CM

## 2017-09-18 DIAGNOSIS — D69.6 THROMBOCYTOPENIA: ICD-10-CM

## 2017-09-18 DIAGNOSIS — D63.1 ANEMIA IN STAGE 3 CHRONIC KIDNEY DISEASE: Primary | ICD-10-CM

## 2017-09-18 DIAGNOSIS — N18.30 ANEMIA IN STAGE 3 CHRONIC KIDNEY DISEASE: Primary | ICD-10-CM

## 2017-09-18 DIAGNOSIS — D52.0 DIETARY FOLATE DEFICIENCY ANEMIA: ICD-10-CM

## 2017-09-18 LAB
ABO + RH BLD: NORMAL
ALBUMIN SERPL BCP-MCNC: 3.1 G/DL
ALP SERPL-CCNC: 155 U/L
ALT SERPL W/O P-5'-P-CCNC: 14 U/L
ANION GAP SERPL CALC-SCNC: 10 MMOL/L
AST SERPL-CCNC: 22 U/L
BASOPHILS # BLD AUTO: 0.02 K/UL
BASOPHILS NFR BLD: 0.4 %
BILIRUB SERPL-MCNC: 0.2 MG/DL
BLD GP AB SCN CELLS X3 SERPL QL: NORMAL
BUN SERPL-MCNC: 45 MG/DL
CALCIUM SERPL-MCNC: 9.1 MG/DL
CHLORIDE SERPL-SCNC: 113 MMOL/L
CO2 SERPL-SCNC: 19 MMOL/L
CREAT SERPL-MCNC: 1.9 MG/DL
CRP SERPL-MCNC: 25.2 MG/L
DIFFERENTIAL METHOD: ABNORMAL
EOSINOPHIL # BLD AUTO: 0.3 K/UL
EOSINOPHIL NFR BLD: 5.2 %
ERYTHROCYTE [DISTWIDTH] IN BLOOD BY AUTOMATED COUNT: 13.5 %
ERYTHROCYTE [SEDIMENTATION RATE] IN BLOOD BY WESTERGREN METHOD: 60 MM/HR
EST. GFR  (AFRICAN AMERICAN): 32.8 ML/MIN/1.73 M^2
EST. GFR  (NON AFRICAN AMERICAN): 28.4 ML/MIN/1.73 M^2
GLUCOSE SERPL-MCNC: 149 MG/DL
HCT VFR BLD AUTO: 22.8 %
HGB BLD-MCNC: 7.6 G/DL
LYMPHOCYTES # BLD AUTO: 1.1 K/UL
LYMPHOCYTES NFR BLD: 21.2 %
MCH RBC QN AUTO: 31.5 PG
MCHC RBC AUTO-ENTMCNC: 33.3 G/DL
MCV RBC AUTO: 95 FL
MONOCYTES # BLD AUTO: 0.3 K/UL
MONOCYTES NFR BLD: 5.8 %
NEUTROPHILS # BLD AUTO: 3.4 K/UL
NEUTROPHILS NFR BLD: 67.2 %
PLATELET # BLD AUTO: 247 K/UL
PMV BLD AUTO: 9.9 FL
POTASSIUM SERPL-SCNC: 4.6 MMOL/L
PROT SERPL-MCNC: 7 G/DL
RBC # BLD AUTO: 2.41 M/UL
SODIUM SERPL-SCNC: 142 MMOL/L
WBC # BLD AUTO: 4.99 K/UL

## 2017-09-18 PROCEDURE — 83020 HEMOGLOBIN ELECTROPHORESIS: CPT

## 2017-09-18 PROCEDURE — 85025 COMPLETE CBC W/AUTO DIFF WBC: CPT

## 2017-09-18 PROCEDURE — 3077F SYST BP >= 140 MM HG: CPT | Mod: S$GLB,,, | Performed by: INTERNAL MEDICINE

## 2017-09-18 PROCEDURE — 86901 BLOOD TYPING SEROLOGIC RH(D): CPT

## 2017-09-18 PROCEDURE — 85651 RBC SED RATE NONAUTOMATED: CPT

## 2017-09-18 PROCEDURE — 3078F DIAST BP <80 MM HG: CPT | Mod: S$GLB,,, | Performed by: INTERNAL MEDICINE

## 2017-09-18 PROCEDURE — 3008F BODY MASS INDEX DOCD: CPT | Mod: S$GLB,,, | Performed by: INTERNAL MEDICINE

## 2017-09-18 PROCEDURE — 80053 COMPREHEN METABOLIC PANEL: CPT

## 2017-09-18 PROCEDURE — 86900 BLOOD TYPING SEROLOGIC ABO: CPT

## 2017-09-18 PROCEDURE — 82668 ASSAY OF ERYTHROPOIETIN: CPT

## 2017-09-18 PROCEDURE — 86140 C-REACTIVE PROTEIN: CPT

## 2017-09-18 PROCEDURE — 99999 PR PBB SHADOW E&M-EST. PATIENT-LVL III: CPT | Mod: PBBFAC,,, | Performed by: INTERNAL MEDICINE

## 2017-09-18 PROCEDURE — 99205 OFFICE O/P NEW HI 60 MIN: CPT | Mod: S$GLB,,, | Performed by: INTERNAL MEDICINE

## 2017-09-18 PROCEDURE — 83021 HEMOGLOBIN CHROMOTOGRAPHY: CPT

## 2017-09-18 PROCEDURE — 99499 UNLISTED E&M SERVICE: CPT | Mod: S$GLB,,, | Performed by: INTERNAL MEDICINE

## 2017-09-18 NOTE — ASSESSMENT & PLAN NOTE
New problem as of 9/7/17. We will correct the folate deficiency. If her thrombocytopenia does not improve, we will consider other causes.

## 2017-09-18 NOTE — LETTER
September 18, 2017      Beverly Muniz MD  [address]  [facility] [address]           [facility]  [address]  [facility] [address]  Phone: [phone]          Patient: Lucy Perez   MR Number: [id number]   YOB: 1960   Date of Visit: 9/18/2017       Dear Dr. Beverly Muniz:    Thank you for referring Lucy Perez to me for evaluation. Attached you will find relevant portions of my assessment and plan of care.    If you have questions, please do not hesitate to call me. I look forward to following Lucy Perez along with you.    Sincerely,    Mike Owen MD    Enclosure  CC:  No Recipients    If you would like to receive this communication electronically, please contact [email or web address] or [phone] to request more information on [employer] access.    For providers and/or their staff who would like to refer a patient to [facility], please contact us through our one-stop-shop provider referral line, [facility], at [phone].    If you feel you have received this communication in error or would no longer like to receive these types of communications, please e-mail [email or web address]

## 2017-09-18 NOTE — ASSESSMENT & PLAN NOTE
Ms. Perez has a chronic anemia that is currently hypoproliferative (supported by low reticulocyte production). I suspect that the cause of her anemia is multifactorial. She has advanced chronic kidney disease (nephrology referral made by PCP), a chronic inflammatory condition (sarcoidosis), and recently has developed a folate deficiency. Additionally, she has never actually had a normal CBC in our medical record, so an underlying hemoglobinopathy should also be ruled out.     We will obtain the following tests today:   - CBC - results show hgb 7.6. No immediate need for transfusion.    - CMP   - ESR, CRP  - CRP elevated, consistent with chronic inflammatory disease.    - Erythropoietin level   - Hemoglobin electrophoresis    For management of her anemia, we will initially want to correct the folate deficiency and monitor for improvement. Because of her chronic kidney disease, she will likely need an CHICHI, but we will re-evaluate this upon repletion of her folate.    She will return to clinic in three weeks for monitoring, particularly for blood transfusion needs. If her folate is replete at that visit, we will address the need for an CHICHI such as darbepoietin.

## 2017-09-20 LAB
ERYTHROPOIETIN: 10.2 MIU/ML
HGB A MFR BLD ELPH: 97.1 % (ref 95.8–98)
HGB A2 MFR BLD: 2.9 % (ref 2–3.3)
HGB F MFR BLD: 0 % (ref 0–0.9)
HGB FRACT BLD ELPH-IMP: NORMAL
HGB OTHER MFR BLD ELPH: 0 %
THEVP VARIANT 2: NORMAL
THEVP VARIANT 3: NORMAL

## 2017-09-21 ENCOUNTER — TELEPHONE (OUTPATIENT)
Dept: HEMATOLOGY/ONCOLOGY | Facility: CLINIC | Age: 57
End: 2017-09-21

## 2017-09-21 RX ORDER — FLUOXETINE 20 MG/1
20 TABLET ORAL DAILY
Qty: 90 TABLET | Refills: 2 | Status: SHIPPED | OUTPATIENT
Start: 2017-09-21 | End: 2018-02-21

## 2017-09-21 NOTE — TELEPHONE ENCOUNTER
Called Ms. Perez to review results. Spoke with her daughter who was in clinic with her the other day. We discussed that she will need treatment with ESAs, and we will plan on getting approval for and starting darbepoietin in the near future. She understood the reasoning. All questions answered to her satisfaction.    Mike Owen MD  Hematology/Oncology and BMT

## 2017-09-25 DIAGNOSIS — I10 ESSENTIAL HYPERTENSION: ICD-10-CM

## 2017-09-25 RX ORDER — AMLODIPINE BESYLATE 10 MG/1
10 TABLET ORAL DAILY
Qty: 90 TABLET | Refills: 2 | Status: ON HOLD | OUTPATIENT
Start: 2017-09-25 | End: 2018-02-27 | Stop reason: HOSPADM

## 2017-10-09 ENCOUNTER — INFUSION (OUTPATIENT)
Dept: INFUSION THERAPY | Facility: HOSPITAL | Age: 57
End: 2017-10-09
Attending: INTERNAL MEDICINE
Payer: MEDICARE

## 2017-10-09 ENCOUNTER — OFFICE VISIT (OUTPATIENT)
Dept: HEMATOLOGY/ONCOLOGY | Facility: CLINIC | Age: 57
End: 2017-10-09
Payer: MEDICARE

## 2017-10-09 VITALS
BODY MASS INDEX: 29.58 KG/M2 | HEART RATE: 68 BPM | TEMPERATURE: 99 F | SYSTOLIC BLOOD PRESSURE: 167 MMHG | DIASTOLIC BLOOD PRESSURE: 80 MMHG | OXYGEN SATURATION: 100 % | RESPIRATION RATE: 18 BRPM | WEIGHT: 167 LBS

## 2017-10-09 DIAGNOSIS — G44.52 NEW DAILY PERSISTENT HEADACHE: ICD-10-CM

## 2017-10-09 DIAGNOSIS — D63.1 ANEMIA IN STAGE 3 CHRONIC KIDNEY DISEASE: Primary | ICD-10-CM

## 2017-10-09 DIAGNOSIS — N18.30 ANEMIA IN STAGE 3 CHRONIC KIDNEY DISEASE: Primary | ICD-10-CM

## 2017-10-09 DIAGNOSIS — E53.8 FOLIC ACID DEFICIENCY: ICD-10-CM

## 2017-10-09 DIAGNOSIS — I10 ESSENTIAL HYPERTENSION: ICD-10-CM

## 2017-10-09 PROCEDURE — 96372 THER/PROPH/DIAG INJ SC/IM: CPT

## 2017-10-09 PROCEDURE — 99214 OFFICE O/P EST MOD 30 MIN: CPT | Mod: S$GLB,,, | Performed by: INTERNAL MEDICINE

## 2017-10-09 PROCEDURE — 63600175 PHARM REV CODE 636 W HCPCS: Performed by: INTERNAL MEDICINE

## 2017-10-09 PROCEDURE — 99999 PR PBB SHADOW E&M-EST. PATIENT-LVL III: CPT | Mod: PBBFAC,,, | Performed by: INTERNAL MEDICINE

## 2017-10-09 PROCEDURE — 99499 UNLISTED E&M SERVICE: CPT | Mod: S$GLB,,, | Performed by: INTERNAL MEDICINE

## 2017-10-09 RX ADMIN — DARBEPOETIN ALFA 40 MCG: 40 INJECTION, SOLUTION INTRAVENOUS; SUBCUTANEOUS at 11:10

## 2017-10-09 NOTE — ASSESSMENT & PLAN NOTE
Ms. Perez continues to have anemia despite repletion of her folate. This is most likely due to underlying chronic kidney disease.    We discussed darbepoietin injections, and she will begin these today. She will receive 40 mcg (0.45 mcg/kg), with plan to treat every 4 weeks. We will plan on biweekly laboratory monitoring for her anemia ([facility] this is done weekly, but travel is difficult for Ms. Perez). We will target a hemoglobin of ~10 g/dl.     She will follow-up in 4 weeks for continued evaluation and monitoring.

## 2017-10-09 NOTE — PROGRESS NOTES
HEMATOLOGIC MALIGNANCIES PROGRESS NOTE    IDENTIFYING STATEMENT   Lucy Perez (Lucy) is a 57 y.o. female with a  of 1960 from [place] with the diagnosis of anemia.      ONCOLOGY HISTORY:    1. Anemia, likely multifactorial              A.  No normal hemoglobin on record. First valuve is from 13 (10.7 g/dl).               B. 17: Hospital follow-up CBC - hgb 7.3. WBC and plt normal. MCV 96.              C. 17: Hgb 7.2. Plt 149. Retic 1.7%. Iron studies normal. Folic acid low (3.4). B12 normal.    D. 10/9/17: Hgb 8.3. Folate 13.1.      2. History of R-MCA stroke with R putaminal hemorrhagic transformation and residual L-sided deficits.  3. S/p PEG  4. Chronic kidney disease, Stage IV  5. Sarcoidosis    INTERVAL HISTORY:      Ms. Perez returns to clinic for follow-up of her anemia. Since her last visit, she has developed right-sided headaches (on the side of her previous operation). She otherwise is okay. Denies shortness of breath. No bleeding. See detailed review of systems below.     Past Medical History, Past Social History and Past Family History have been reviewed and are unchanged except as noted in the interval history.    MEDICATIONS:     Prior to Admission medications    Medication Sig Start Date End Date Taking? Authorizing Provider   albuterol (PROVENTIL) 2.5 mg /3 mL (0.083 %) nebulizer solution Take 3 mLs (2.5 mg total) by nebulization every 6 (six) hours as needed for Wheezing. Rescue 17  Beverly Muniz MD   alprazolam (XANAX) 0.5 MG tablet 1/2 to 1 tab Q8 hours as needed for anxiety 17   Beverly Muniz MD   amlodipine (NORVASC) 10 MG tablet Take 1 tablet (10 mg total) by mouth once daily. 17  Beverly Muniz MD   aspirin (ECOTRIN) 81 MG EC tablet Take 81 mg by mouth once daily.      Historical Provider, MD   atorvastatin (LIPITOR) 40 MG tablet Take 1 tablet (40 mg total) by mouth once daily. For Cholesterol 10/20/16    "Beverly Muniz MD   BD INSULIN PEN NEEDLE UF SHORT 31 gauge x 5/16" Ndle USE TO INJECT NOVOLOG FLEXPEN BEFORE MEALS 5/29/17   Beatriz Aguilera DNP, NP   blood sugar diagnostic (ACCU-CHEK SMARTVIEW TEST STRIP) Strp 1 strip by Misc.(Non-Drug; Combo Route) route 2 (two) times daily. 11/5/15   Beatriz Aguilera DNP, NP   clotrimazole-betamethasone 1-0.05% (LOTRISONE) cream Apply topically 2 (two) times daily. Apply to area of rash/iting on buttocks 1-2x/day as needed 8/10/17   Beverly Muniz MD   colchicine 0.6 mg tablet 1 tab daily as needed for gout flare 6/16/16   Beverly Muniz MD   famotidine (PEPCID) 20 MG tablet TAKE 1 TABLET (20 MG TOTAL) BY MOUTH ONCE DAILY. 5/11/17   Beverly Muniz MD   ferrous sulfate 220 mg (44 mg iron)/5 mL solution 10ml daily for Iron/Give via PEG 8/23/17   Beverly Muniz MD   fluoxetine 20 MG tablet Take 1 tablet (20 mg total) by mouth once daily. 9/21/17   Beverly Muniz MD   folic acid (FOLVITE) 1 MG tablet Take 1 tablet (1 mg total) by mouth once daily. 9/14/17 9/14/18  Beverly Muniz MD   hydrALAZINE (APRESOLINE) 100 MG tablet Take 1 tablet (100 mg total) by mouth 3 (three) times daily. 2/22/17   Beverly Muniz MD   hydrochlorothiazide (HYDRODIURIL) 25 MG tablet Take 25 mg by mouth once daily.    Historical Provider, MD   insulin detemir (LEVEMIR FLEXTOUCH) 100 unit/mL (3 mL) SubQ InPn pen 14 units in AM  Patient taking differently: Inject 14 Units into the skin every evening.  7/12/17   Beverly Muniz MD   labetalol (NORMODYNE) 100 MG tablet Take 500 mg by mouth every 8 (eight) hours.    Historical Provider, MD   levetiracetam (KEPPRA) 500 MG Tab Take 1 tablet (500 mg total) by mouth every 8 (eight) hours. 8/10/17 8/10/18  Beverly Muniz MD   multivitamin with iron Tab 1/day  Patient taking differently: Take 1 tablet by mouth once daily.  6/20/16   Beverly Muniz MD   nut.tx.gluc.intol,lac-free,soy (GLUCERNA 1.5 NOAH) Liqd 1.5 cans in AM, 1 can at " Noon , 1.5cans at Dinner, and 1 can before Bed/Total 5 cans daily 11/25/16   Beverly Muniz MD   nystatin (MYCOSTATIN) powder Apply topically 2 (two) times daily. Apply to irritated skin 2x/day as needed 8/10/17   Beverly Muniz MD   oxcarbazepine (TRILEPTAL) 150 MG Tab Take 1 tablet (150 mg total) by mouth 2 (two) times daily. 9/15/17 9/15/18  Pola Galloway MD   phenytoin (DILANTIN) 300 MG ER capsule Take 300 mg by mouth every evening.    Historical Provider, MD       ALLERGIES:   Review of patient's allergies indicates:   Allergen Reactions    Lisinopril Other (See Comments)     Angioedema      Vicodin [hydrocodone-acetaminophen] Rash        ROS:       Review of Systems   Constitutional: Negative for diaphoresis, fatigue, fever and unexpected weight change.   HENT:   Negative for lump/mass and sore throat.    Eyes: Negative for icterus.   Respiratory: Negative for cough and shortness of breath.    Cardiovascular: Negative for chest pain and palpitations.   Gastrointestinal: Negative for abdominal distention, constipation, diarrhea, nausea and vomiting.   Genitourinary: Negative for dysuria and frequency.    Musculoskeletal: Negative for arthralgias, gait problem and myalgias.   Skin: Negative for rash.   Neurological: Positive for headaches. Negative for dizziness and gait problem.   Hematological: Negative for adenopathy. Does not bruise/bleed easily.   Psychiatric/Behavioral: The patient is not nervous/anxious.        PHYSICAL EXAM:  Vitals:    10/09/17 1034   BP: (!) 167/80   Pulse: 68   Resp: 18   Temp: 98.9 °F (37.2 °C)   TempSrc: Oral   SpO2: 100%   Weight: 75.8 kg (167 lb)   PainSc:   8       Physical Exam   Constitutional: She is oriented to person, place, and time. She appears well-developed and well-nourished. No distress.   She is in a wheelchair.   HENT:   Mouth/Throat: Mucous membranes are normal. No oral lesions.   S/p craniotomy   Eyes: Conjunctivae are normal.   Neck: No thyromegaly  present.   Cardiovascular: Normal rate, regular rhythm and normal heart sounds.    No murmur heard.  Pulmonary/Chest: Breath sounds normal. She has no wheezes. She has no rales.   Abdominal: Soft. She exhibits no distension and no mass. There is no splenomegaly or hepatomegaly. There is no tenderness.   Lymphadenopathy:     She has no cervical adenopathy.        Right cervical: No deep cervical adenopathy present.       Left cervical: No deep cervical adenopathy present.     She has no axillary adenopathy.        Right: No inguinal adenopathy present.        Left: No inguinal adenopathy present.   Neurological: She is alert and oriented to person, place, and time. She has normal strength and normal reflexes. No cranial nerve deficit. Coordination normal.   She has marked weakness on the left compared with the right. Hyperreflexive on the left.     Skin: No rash noted.     LAB:   Results for orders placed or performed in visit on 10/09/17   CBC auto differential   Result Value Ref Range    WBC 5.51 3.90 - 12.70 K/uL    RBC 2.75 (L) 4.00 - 5.40 M/uL    Hemoglobin 8.3 (L) 12.0 - 16.0 g/dL    Hematocrit 26.2 (L) 37.0 - 48.5 %    MCV 95 82 - 98 fL    MCH 30.2 27.0 - 31.0 pg    MCHC 31.7 (L) 32.0 - 36.0 g/dL    RDW 13.6 11.5 - 14.5 %    Platelets 230 150 - 350 K/uL    MPV 10.2 9.2 - 12.9 fL    Gran # 4.2 1.8 - 7.7 K/uL    Lymph # 0.9 (L) 1.0 - 4.8 K/uL    Mono # 0.2 (L) 0.3 - 1.0 K/uL    Eos # 0.3 0.0 - 0.5 K/uL    Baso # 0.02 0.00 - 0.20 K/uL    Gran% 75.3 (H) 38.0 - 73.0 %    Lymph% 15.6 (L) 18.0 - 48.0 %    Mono% 4.0 4.0 - 15.0 %    Eosinophil% 4.5 0.0 - 8.0 %    Basophil% 0.4 0.0 - 1.9 %    Differential Method Automated    Folate   Result Value Ref Range    Folate 13.1 4.0 - 24.0 ng/mL   Type & Screen   Result Value Ref Range    Group & Rh O POS     Indirect Cha NEG        PROBLEMS ASSESSED THIS VISIT:    1. Anemia in stage 3 chronic kidney disease    2. Essential hypertension    3. New daily persistent headache     4. Folic acid deficiency        PLAN:       Anemia in stage 3 chronic kidney disease  Ms. Perez continues to have anemia despite repletion of her folate. This is most likely due to underlying chronic kidney disease.    We discussed darbepoietin injections, and she will begin these today. She will receive 40 mcg (0.45 mcg/kg), with plan to treat every 4 weeks. We will plan on biweekly laboratory monitoring for her anemia ([facility] this is done weekly, but travel is difficult for Ms. Perez). We will target a hemoglobin of ~10 g/dl.     She will follow-up in 4 weeks for continued evaluation and monitoring.     Essential hypertension  Uncontrolled today. Will notify her PCP, who manages this. She has been having headaches, and I wonder if this is the cause.     New daily persistent headache  This is a new problem. Ms. Perez is having new headaches on the side of her prior craniotomy. She has not taken any medication. I have advised acetaminophen but will also notify her PCP. While her exam is unchanged, continued monitoring is likely in order, given the severity of her prior intracerebral hemorrhage.     Folic acid deficiency  We have now effectively resolved the prior folic acid deficiency with oral repletion of folic acid. Continue this therapy.       Mike Owen MD  Hematology and Stem Cell Transplant    Distress Screening Results: Psychosocial Distress screening score of Distress Score: 0 noted and reviewed. No intervention indicated.

## 2017-10-09 NOTE — ASSESSMENT & PLAN NOTE
This is a new problem. Ms. Perez is having new headaches on the side of her prior craniotomy. She has not taken any medication. I have advised acetaminophen but will also notify her PCP. While her exam is unchanged, continued monitoring is likely in order, given the severity of her prior intracerebral hemorrhage.

## 2017-10-09 NOTE — Clinical Note
Patient will need labs in 2 weeks (CBC, RFP) and visit with me in 4 weeks with labs before (Same as above) and chemo appt after for Aranesp injection. Patient prefers no appointments before 10:00 AM if possible. Thanks.

## 2017-10-09 NOTE — ASSESSMENT & PLAN NOTE
Uncontrolled today. Will notify her PCP, who manages this. She has been having headaches, and I wonder if this is the cause.

## 2017-10-09 NOTE — ASSESSMENT & PLAN NOTE
We have now effectively resolved the prior folic acid deficiency with oral repletion of folic acid. Continue this therapy.

## 2017-10-25 RX ORDER — LABETALOL 100 MG/1
500 TABLET, FILM COATED ORAL EVERY 8 HOURS
Qty: 450 TABLET | Refills: 6 | Status: ON HOLD | OUTPATIENT
Start: 2017-10-25 | End: 2018-02-27 | Stop reason: HOSPADM

## 2017-10-25 NOTE — TELEPHONE ENCOUNTER
----- Message from Vesna Johnson sent at 10/25/2017 10:16 AM CDT -----  Contact: Reina/Daughter/ [phone]   Type: Rx    Name of medication(s): labetalol (NORMODYNE) 100 MG tablet    Is this a refill? New rx? Refill     Who prescribed medication? Dr. Max     Pharmacy Name, Phone, & Location:  [address] [phone] (Phone)  [phone] (Fax    Comments: pt daughter is calling to have a refill on the Rx above. Pt has been having headaches.     Pt want to know if the pt should be still taking the medication phenytoin (DILANTIN) 300 MG ER capsule, and hydrochlorothiazide (HYDRODIURIL) 25 MG tablet. Please call and advise

## 2017-10-27 ENCOUNTER — TELEPHONE (OUTPATIENT)
Dept: INTERNAL MEDICINE | Facility: CLINIC | Age: 57
End: 2017-10-27

## 2017-10-27 DIAGNOSIS — I10 ESSENTIAL HYPERTENSION: Primary | ICD-10-CM

## 2017-10-27 RX ORDER — HYDROCHLOROTHIAZIDE 25 MG/1
25 TABLET ORAL DAILY
Qty: 30 TABLET | Refills: 3 | Status: ON HOLD | OUTPATIENT
Start: 2017-10-27 | End: 2018-02-27 | Stop reason: HOSPADM

## 2017-10-27 NOTE — TELEPHONE ENCOUNTER
Pt Rx Normodyne was sent to [facility]. Pt would like script sent to the [facility] on file.    Thanks Anaid.

## 2017-10-27 NOTE — TELEPHONE ENCOUNTER
----- Message from Cecy Loya sent at 10/27/2017  3:12 PM CDT -----  Contact: Reina/Daughter/ [phone]  Reina called in regards needing to talk with Dr Muniz about patient is still having headaches for the past 15 days. And would like for and advise from Dr Muniz.       Thank you!!!

## 2017-10-28 NOTE — TELEPHONE ENCOUNTER
Spoke with Reina who notes that her mom has had H/As x 1-2 weeks.  Review of medications show she's been out of HCTZ x 5-7 days. She has no F/C,no URI Sx, no seizures. She was told to stop Dilantin ER 300mg by Neurosurgery at [facility]/she will confirm this. I have erx'd in HCTZ 25mg #30/3 Refills now and recommended pt restart this ASAP.  Reina will call with status report x next week or so.

## 2017-11-06 ENCOUNTER — INFUSION (OUTPATIENT)
Dept: INFUSION THERAPY | Facility: HOSPITAL | Age: 57
End: 2017-11-06
Attending: INTERNAL MEDICINE
Payer: MEDICARE

## 2017-11-06 ENCOUNTER — OFFICE VISIT (OUTPATIENT)
Dept: HEMATOLOGY/ONCOLOGY | Facility: CLINIC | Age: 57
End: 2017-11-06
Payer: MEDICARE

## 2017-11-06 ENCOUNTER — LAB VISIT (OUTPATIENT)
Dept: LAB | Facility: HOSPITAL | Age: 57
End: 2017-11-06
Attending: INTERNAL MEDICINE
Payer: MEDICARE

## 2017-11-06 VITALS
RESPIRATION RATE: 18 BRPM | DIASTOLIC BLOOD PRESSURE: 88 MMHG | HEART RATE: 60 BPM | SYSTOLIC BLOOD PRESSURE: 169 MMHG | TEMPERATURE: 98 F

## 2017-11-06 VITALS
RESPIRATION RATE: 20 BRPM | SYSTOLIC BLOOD PRESSURE: 140 MMHG | DIASTOLIC BLOOD PRESSURE: 70 MMHG | TEMPERATURE: 98 F | HEART RATE: 62 BPM | OXYGEN SATURATION: 98 %

## 2017-11-06 DIAGNOSIS — D63.1 ANEMIA IN STAGE 3 CHRONIC KIDNEY DISEASE: Primary | ICD-10-CM

## 2017-11-06 DIAGNOSIS — N18.30 ANEMIA IN STAGE 3 CHRONIC KIDNEY DISEASE: Primary | ICD-10-CM

## 2017-11-06 DIAGNOSIS — G40.209 PARTIAL SYMPTOMATIC EPILEPSY WITH COMPLEX PARTIAL SEIZURES, NOT INTRACTABLE, WITHOUT STATUS EPILEPTICUS: ICD-10-CM

## 2017-11-06 DIAGNOSIS — D63.1 ANEMIA IN STAGE 3 CHRONIC KIDNEY DISEASE: ICD-10-CM

## 2017-11-06 DIAGNOSIS — N18.30 ANEMIA IN STAGE 3 CHRONIC KIDNEY DISEASE: ICD-10-CM

## 2017-11-06 DIAGNOSIS — N18.30 STAGE 3 CHRONIC KIDNEY DISEASE: ICD-10-CM

## 2017-11-06 PROBLEM — D69.6 THROMBOCYTOPENIA: Status: RESOLVED | Noted: 2017-09-18 | Resolved: 2017-11-06

## 2017-11-06 LAB
ALBUMIN SERPL BCP-MCNC: 3.4 G/DL
ANION GAP SERPL CALC-SCNC: 6 MMOL/L
BUN SERPL-MCNC: 46 MG/DL
CALCIUM SERPL-MCNC: 9.3 MG/DL
CHLORIDE SERPL-SCNC: 114 MMOL/L
CO2 SERPL-SCNC: 21 MMOL/L
CREAT SERPL-MCNC: 1.9 MG/DL
ERYTHROCYTE [DISTWIDTH] IN BLOOD BY AUTOMATED COUNT: 13.5 %
EST. GFR  (AFRICAN AMERICAN): 32.8 ML/MIN/1.73 M^2
EST. GFR  (NON AFRICAN AMERICAN): 28.4 ML/MIN/1.73 M^2
GLUCOSE SERPL-MCNC: 179 MG/DL
HCT VFR BLD AUTO: 24.3 %
HGB BLD-MCNC: 7.5 G/DL
MCH RBC QN AUTO: 29.8 PG
MCHC RBC AUTO-ENTMCNC: 30.9 G/DL
MCV RBC AUTO: 96 FL
PHOSPHATE SERPL-MCNC: 3.5 MG/DL
PLATELET # BLD AUTO: 215 K/UL
PMV BLD AUTO: 10.4 FL
POTASSIUM SERPL-SCNC: 4.3 MMOL/L
RBC # BLD AUTO: 2.52 M/UL
SODIUM SERPL-SCNC: 141 MMOL/L
WBC # BLD AUTO: 5.13 K/UL

## 2017-11-06 PROCEDURE — 36415 COLL VENOUS BLD VENIPUNCTURE: CPT

## 2017-11-06 PROCEDURE — 99214 OFFICE O/P EST MOD 30 MIN: CPT | Mod: S$GLB,,, | Performed by: INTERNAL MEDICINE

## 2017-11-06 PROCEDURE — 63600175 PHARM REV CODE 636 W HCPCS: Performed by: INTERNAL MEDICINE

## 2017-11-06 PROCEDURE — 96372 THER/PROPH/DIAG INJ SC/IM: CPT

## 2017-11-06 PROCEDURE — 85027 COMPLETE CBC AUTOMATED: CPT

## 2017-11-06 PROCEDURE — 99499 UNLISTED E&M SERVICE: CPT | Mod: S$GLB,,, | Performed by: INTERNAL MEDICINE

## 2017-11-06 PROCEDURE — 80069 RENAL FUNCTION PANEL: CPT

## 2017-11-06 PROCEDURE — 99999 PR PBB SHADOW E&M-EST. PATIENT-LVL III: CPT | Mod: PBBFAC,,, | Performed by: INTERNAL MEDICINE

## 2017-11-06 RX ADMIN — DARBEPOETIN ALFA 40 MCG: 40 INJECTION, SOLUTION INTRAVENOUS; SUBCUTANEOUS at 12:11

## 2017-11-06 NOTE — PROGRESS NOTES
HEMATOLOGIC MALIGNANCIES PROGRESS NOTE    IDENTIFYING STATEMENT   Lucy Perez (Lucy) is a 57 y.o. female with a  of 1960 from [place] with the diagnosis of anemia.      ONCOLOGY HISTORY:    1. Anemia, likely multifactorial              A.  No normal hemoglobin on record. First valuve is from 13 (10.7 g/dl).               B. 17: Hospital follow-up CBC - hgb 7.3. WBC and plt normal. MCV 96.              C. 17: Hgb 7.2. Plt 149. Retic 1.7%. Iron studies normal. Folic acid low (3.4). B12 normal.    D. 10/9/17: Hgb 8.3. Folate 13.1.    E. 17: Hgb 7.5 g/dl.      2. History of R-MCA stroke with R putaminal hemorrhagic transformation and residual L-sided deficits.  3. S/p PEG  4. Chronic kidney disease, Stage III  5. Sarcoidosis    INTERVAL HISTORY:      Ms. Perez returns to clinic for follow-up of her anemia. Since her last visit, there is not significant change. She is more constipated due to diarrhea, and her stools are darker. She denies any bruising/bleeding. She continues to have unusual feelings in the right side of her head (at the site of prior surgery).     Past Medical History, Past Social History and Past Family History have been reviewed and are unchanged except as noted in the interval history.    MEDICATIONS:     Prior to Admission medications    Medication Sig Start Date End Date Taking? Authorizing Provider   albuterol (PROVENTIL) 2.5 mg /3 mL (0.083 %) nebulizer solution Take 3 mLs (2.5 mg total) by nebulization every 6 (six) hours as needed for Wheezing. Rescue 17  Beverly Muniz MD   alprazolam (XANAX) 0.5 MG tablet 1/2 to 1 tab Q8 hours as needed for anxiety 17   Beverly Muniz MD   amlodipine (NORVASC) 10 MG tablet Take 1 tablet (10 mg total) by mouth once daily. 17  Beverly Muniz MD   aspirin (ECOTRIN) 81 MG EC tablet Take 81 mg by mouth once daily.      Historical Provider, MD   atorvastatin (LIPITOR) 40 MG  "tablet Take 1 tablet (40 mg total) by mouth once daily. For Cholesterol 10/20/16   Beverly Muniz MD   BD INSULIN PEN NEEDLE UF SHORT 31 gauge x 5/16" Ndle USE TO INJECT NOVOLOG FLEXPEN BEFORE MEALS 5/29/17   Beatriz Aguilera DNP, NP   blood sugar diagnostic (ACCU-CHEK SMARTVIEW TEST STRIP) Strp 1 strip by Misc.(Non-Drug; Combo Route) route 2 (two) times daily. 11/5/15   Beatriz Aguilera DNP, NP   clotrimazole-betamethasone 1-0.05% (LOTRISONE) cream Apply topically 2 (two) times daily. Apply to area of rash/iting on buttocks 1-2x/day as needed 8/10/17   Beverly Muniz MD   colchicine 0.6 mg tablet 1 tab daily as needed for gout flare 6/16/16   Beverly Muniz MD   famotidine (PEPCID) 20 MG tablet TAKE 1 TABLET (20 MG TOTAL) BY MOUTH ONCE DAILY. 5/11/17   Beverly Muniz MD   ferrous sulfate 220 mg (44 mg iron)/5 mL solution 10ml daily for Iron/Give via PEG 8/23/17   Beverly Muniz MD   fluoxetine 20 MG tablet Take 1 tablet (20 mg total) by mouth once daily. 9/21/17   Beverly Muniz MD   folic acid (FOLVITE) 1 MG tablet Take 1 tablet (1 mg total) by mouth once daily. 9/14/17 9/14/18  Beverly Muniz MD   hydrALAZINE (APRESOLINE) 100 MG tablet Take 1 tablet (100 mg total) by mouth 3 (three) times daily. 2/22/17   Beverly Muniz MD   hydrochlorothiazide (HYDRODIURIL) 25 MG tablet Take 25 mg by mouth once daily.    Historical Provider, MD   insulin detemir (LEVEMIR FLEXTOUCH) 100 unit/mL (3 mL) SubQ InPn pen 14 units in AM  Patient taking differently: Inject 14 Units into the skin every evening.  7/12/17   Beverly Muniz MD   labetalol (NORMODYNE) 100 MG tablet Take 500 mg by mouth every 8 (eight) hours.    Historical Provider, MD   levetiracetam (KEPPRA) 500 MG Tab Take 1 tablet (500 mg total) by mouth every 8 (eight) hours. 8/10/17 8/10/18  Beverly Muniz MD   multivitamin with iron Tab 1/day  Patient taking differently: Take 1 tablet by mouth once daily.  6/20/16   Beverly Muniz MD "   nut.tx.gluc.intol,lac-free,soy (GLUCERNA 1.5 NOAH) Liqd 1.5 cans in AM, 1 can at Noon , 1.5cans at Dinner, and 1 can before Bed/Total 5 cans daily 11/25/16   Beverly Muniz MD   nystatin (MYCOSTATIN) powder Apply topically 2 (two) times daily. Apply to irritated skin 2x/day as needed 8/10/17   Beverly Muniz MD   oxcarbazepine (TRILEPTAL) 150 MG Tab Take 1 tablet (150 mg total) by mouth 2 (two) times daily. 9/15/17 9/15/18  Pola Galloway MD   phenytoin (DILANTIN) 300 MG ER capsule Take 300 mg by mouth every evening.    Historical Provider, MD       ALLERGIES:   Review of patient's allergies indicates:   Allergen Reactions    Lisinopril Other (See Comments)     Angioedema      Vicodin [hydrocodone-acetaminophen] Rash        ROS:       Review of Systems   Constitutional: Negative for diaphoresis, fatigue, fever and unexpected weight change.   HENT:   Negative for lump/mass and sore throat.    Eyes: Negative for icterus.   Respiratory: Negative for cough and shortness of breath.    Cardiovascular: Negative for chest pain and palpitations.   Gastrointestinal: Negative for abdominal distention, constipation, diarrhea, nausea and vomiting.   Genitourinary: Negative for dysuria and frequency.    Musculoskeletal: Negative for arthralgias, gait problem and myalgias.   Skin: Negative for rash.   Neurological: Positive for headaches. Negative for dizziness and gait problem.   Hematological: Negative for adenopathy. Does not bruise/bleed easily.   Psychiatric/Behavioral: The patient is not nervous/anxious.        PHYSICAL EXAM:  Vitals:    11/06/17 1154   BP: (!) 140/70   Pulse: 62   Resp: 20   Temp: 97.9 °F (36.6 °C)   TempSrc: Oral   SpO2: 98%   PainSc: 0-No pain       Physical Exam   Constitutional: She is oriented to person, place, and time. She appears well-developed and well-nourished. No distress.   She is in a wheelchair.   HENT:   Mouth/Throat: Mucous membranes are normal. No oral lesions.   S/p  craniotomy   Eyes: Conjunctivae are normal.   Neck: No thyromegaly present.   Cardiovascular: Normal rate, regular rhythm and normal heart sounds.    No murmur heard.  Pulmonary/Chest: Breath sounds normal. She has no wheezes. She has no rales.   Abdominal: Soft. She exhibits no distension and no mass. There is no splenomegaly or hepatomegaly. There is no tenderness.   Lymphadenopathy:     She has no cervical adenopathy.        Right cervical: No deep cervical adenopathy present.       Left cervical: No deep cervical adenopathy present.     She has no axillary adenopathy.        Right: No inguinal adenopathy present.        Left: No inguinal adenopathy present.   Neurological: She is alert and oriented to person, place, and time. She has normal strength and normal reflexes. No cranial nerve deficit. Coordination normal.   She has marked weakness on the left compared with the right. Hyperreflexive on the left.     Skin: No rash noted.     LAB:   Results for orders placed or performed in visit on 11/06/17   CBC Without Differential   Result Value Ref Range    WBC 5.13 3.90 - 12.70 K/uL    RBC 2.52 (L) 4.00 - 5.40 M/uL    Hemoglobin 7.5 (L) 12.0 - 16.0 g/dL    Hematocrit 24.3 (L) 37.0 - 48.5 %    MCV 96 82 - 98 fL    MCH 29.8 27.0 - 31.0 pg    MCHC 30.9 (L) 32.0 - 36.0 g/dL    RDW 13.5 11.5 - 14.5 %    Platelets 215 150 - 350 K/uL    MPV 10.4 9.2 - 12.9 fL   Renal function panel   Result Value Ref Range    Glucose 179 (H) 70 - 110 mg/dL    Sodium 141 136 - 145 mmol/L    Potassium 4.3 3.5 - 5.1 mmol/L    Chloride 114 (H) 95 - 110 mmol/L    CO2 21 (L) 23 - 29 mmol/L    BUN, Bld 46 (H) 6 - 20 mg/dL    Calcium 9.3 8.7 - 10.5 mg/dL    Creatinine 1.9 (H) 0.5 - 1.4 mg/dL    Albumin 3.4 (L) 3.5 - 5.2 g/dL    Phosphorus 3.5 2.7 - 4.5 mg/dL    eGFR if  32.8 (A) >60 mL/min/1.73 m^2    eGFR if non  28.4 (A) >60 mL/min/1.73 m^2    Anion Gap 6 (L) 8 - 16 mmol/L       PROBLEMS ASSESSED THIS  VISIT:    1. Anemia in stage 3 chronic kidney disease    2. Partial symptomatic epilepsy with complex partial seizures, not intractable, without status epilepticus    3. Stage 3 chronic kidney disease        PLAN:       Anemia in stage 3 chronic kidney disease  Ms. Perez has worsened hemoglobin today, despite initiating darbepoetin 4 weeks ago. She is relatively asymptomatic from this, but I am concerned that her hemoglobin is dropping, and she has poor reticulocyte response.    She clearly has a component of chronic inflammatory disease, as her CRP and ESR are high. I will refer to GI to evaluate for sources of blood loss. She reports a history of ulcers, so she may need upper endoscopy.     Plan:  - Continue darbepoetin  - Check hemoglobin, type and screen in 2 weeks  - Return to clinic in 4 weeks with CBC, Type and Screen, retic count, SPEP/JOHNATHON, SFLC.     We will also obtain SPEP/JOHNATHON and serum free light chains at next visit to exclude a plasma cell dyscrasia as the source of her anemia.     Partial symptomatic epilepsy with complex partial seizures, not intractable, without status epilepticus  She is stable on levetiracetam. She asked what to do with phenytoin. As she is stable, I recommended she continue on her current regimen. No evidence of seizure activity. PCP will follow.     Stage 3 chronic kidney disease  Stable, but we will check SPEP/JOHNATHON and SFLC at next visit to exclude a plasma cell dyscrasia as contributing to this.     Follow-up in 4 weeks with above studies    Mike Owen MD  Hematology and Stem Cell Transplant    Distress Screening Results: Psychosocial Distress screening score of Distress Score: 0 noted and reviewed. No intervention indicated.

## 2017-11-06 NOTE — ASSESSMENT & PLAN NOTE
She is stable on levetiracetam. She asked what to do with phenytoin. As she is stable, I recommended she continue on her current regimen. No evidence of seizure activity. PCP will follow.

## 2017-11-06 NOTE — NURSING
1245 pt in wheelchair here for aranesp injection, labs reviewed, injection given to right lower abdomen without issue

## 2017-11-06 NOTE — ASSESSMENT & PLAN NOTE
Stable, but we will check SPEP/JOHNATHON and SFLC at next visit to exclude a plasma cell dyscrasia as contributing to this.

## 2017-11-06 NOTE — Clinical Note
Please arrange for follow-up visit in 4 weeks with labs before (CBC, CMP, retic, SPEP, JOHNATHON, free light chains, type and screen). Will need chemo appt same day for darbepoetin (Aranesp).  Please arrange for labs drawn through home health (CBC, type and screen) in two weeks. There are separate orders for these.

## 2017-11-06 NOTE — ASSESSMENT & PLAN NOTE
Ms. Perez has worsened hemoglobin today, despite initiating darbepoetin 4 weeks ago. She is relatively asymptomatic from this, but I am concerned that her hemoglobin is dropping, and she has poor reticulocyte response.    She clearly has a component of chronic inflammatory disease, as her CRP and ESR are high. I will refer to GI to evaluate for sources of blood loss. She reports a history of ulcers, so she may need upper endoscopy.     Plan:  - Continue darbepoetin  - Check hemoglobin, type and screen in 2 weeks  - Return to clinic in 4 weeks with CBC, Type and Screen, retic count, SPEP/JOHNATHON, SFLC.     We will also obtain SPEP/JOHNATHON and serum free light chains at next visit to exclude a plasma cell dyscrasia as the source of her anemia.

## 2017-11-07 ENCOUNTER — TELEPHONE (OUTPATIENT)
Dept: INTERNAL MEDICINE | Facility: CLINIC | Age: 57
End: 2017-11-07

## 2017-11-07 NOTE — TELEPHONE ENCOUNTER
----- Message from Florencio Nickerson sent at 11/7/2017  8:55 AM CST -----  Contact: Daughter/Milagros [phone]  Daughter wants you to know her Mother had bloodwork done yesterday and can you please check the lab results.    Please call and advise    Thank you

## 2017-11-08 ENCOUNTER — TELEPHONE (OUTPATIENT)
Dept: INTERNAL MEDICINE | Facility: CLINIC | Age: 57
End: 2017-11-08

## 2017-11-08 DIAGNOSIS — D64.9 ANEMIA, UNSPECIFIED TYPE: Primary | ICD-10-CM

## 2017-11-08 NOTE — TELEPHONE ENCOUNTER
Spoke with Reina ruiz her Mom's lab(11/6)-showed H/H down again to 7.5/24.3, Creat improved to 1.9(last at 2.1) and GFR improved to 32(last at 29). Dr Owen has recommended GI consult and more frequent lab following through HH. I have placed GI consult and HH consult for lab draws.  Pt. also needs a FIT done.  Tione, please call Reina to schedule her mom a GI Appt ASAP, to  a FIT, and start HH.  I placed orders. Thanks

## 2017-11-09 ENCOUNTER — OFFICE VISIT (OUTPATIENT)
Dept: PHYSICAL MEDICINE AND REHAB | Facility: CLINIC | Age: 57
End: 2017-11-09
Payer: MEDICARE

## 2017-11-09 VITALS — WEIGHT: 167 LBS | BODY MASS INDEX: 29.59 KG/M2 | HEIGHT: 63 IN

## 2017-11-09 DIAGNOSIS — G81.14 LEFT SPASTIC HEMIPARESIS: Primary | ICD-10-CM

## 2017-11-09 DIAGNOSIS — G89.0 CENTRAL PAIN SYNDROME: ICD-10-CM

## 2017-11-09 PROCEDURE — 99213 OFFICE O/P EST LOW 20 MIN: CPT | Mod: S$GLB,,, | Performed by: PHYSICAL MEDICINE & REHABILITATION

## 2017-11-09 PROCEDURE — 99999 PR PBB SHADOW E&M-EST. PATIENT-LVL II: CPT | Mod: PBBFAC,,, | Performed by: PHYSICAL MEDICINE & REHABILITATION

## 2017-11-09 PROCEDURE — 99499 UNLISTED E&M SERVICE: CPT | Mod: S$GLB,,, | Performed by: PHYSICAL MEDICINE & REHABILITATION

## 2017-11-09 RX ORDER — DANTROLENE SODIUM 25 MG/1
50 CAPSULE ORAL 3 TIMES DAILY
Qty: 180 CAPSULE | Refills: 3 | Status: SHIPPED | OUTPATIENT
Start: 2017-11-09 | End: 2017-11-20 | Stop reason: SDUPTHER

## 2017-11-09 NOTE — TELEPHONE ENCOUNTER
Anaid, please call Reina and ask her to get Dr Pardo to write this script today for the Dantrolene when she sees him.  Thanks

## 2017-11-09 NOTE — TELEPHONE ENCOUNTER
Pt daughter would like a refill on Rx Dantrolene. Please send script to [facility] - [place], [place] - [address].    Thanks Anaid.

## 2017-11-10 NOTE — PROGRESS NOTES
"Subjective:       Patient ID: Lucy Perez is a 57 y.o. female.    Chief Complaint: No chief complaint on file.    This very pleasant 56yo BF is followed for:    -- central pain syndrome.  She had a R SAH requiring emergent hemicraniex on 8/13/16.  She received a PEG but has since been placed on a diet.  She was tx at [facility] [place] [place] and went to a SNF for only 18 days where she did not progress much.   There are no records available from her hospitalization.  She is c/o left side pain as "stinging" and "electric shocks".  This occurs without movement but especially with PROM of the arm/leg.  Another MD increased gabapentin from 100mg TID to 300mg TID but this made her too sleepy and was changed to 300mg Q HS only.  I started carbamazepine 200mg BID at the last visit on 10/18/16 but her daughter reports today that it was no help and was stopped.      -- spasticity involving primarily the left hand.  It is difficult to extend the fingers of the left hand due to pain.  It is not clear if this is pain of spasticity or due to the central pain syndrome.  I started dantrolene 25mg TID and increased to 50mg TID but she was hospitalized with HTN which was attributed to dantrolene (?) and it was stopped before she could take the 50mg dose.  She recently had HH PT/OT restarted   She is able to answer my questions mostly appropriately but does appear to have some cognitive deficits.  Her family is very involved in her care.      --CC complaint today of "left arm/leg spasticity". States she has been having difficulty with using both the hand and arm on left side. Reports being painful at times.  She is currently on Dantrolene 25mg TID without much relief. No other complaints at this time.                 Review of Systems   Constitutional: Negative for fatigue.   Eyes: Negative for visual disturbance.   Respiratory: Negative for shortness of breath.    Cardiovascular: Negative for chest pain and leg swelling. "   Gastrointestinal: Negative for constipation.   Genitourinary: Negative for difficulty urinating, dysuria, frequency and urgency.   Musculoskeletal: Positive for gait problem. Negative for arthralgias, back pain, joint swelling, myalgias, neck pain and neck stiffness.   Neurological: Positive for speech difficulty, weakness and numbness. Negative for tremors.   Psychiatric/Behavioral: Negative for dysphoric mood and sleep disturbance. The patient is not nervous/anxious.        Objective:      Physical Exam   Constitutional: She is oriented to person, place, and time. She appears well-nourished.   Eyes: EOM are normal. Pupils are equal, round, and reactive to light.   Neck: Normal range of motion. Neck supple.   Cardiovascular: Normal rate.    Pulmonary/Chest: Effort normal.   Musculoskeletal:   RUE/RLE AROM WNL  LUE/LLE with no AROM   Neurological: She is oriented to person, place, and time.   RUE/RLE 5/5  LUE 0/5  LLE 1/5 HF, HE, 0/5 KF/KE/DF/PF   Skin: No rash noted.   Psychiatric: She has a normal mood and affect.       Assessment:       1. Left spastic hemiparesis -- Increased dantrolene to 50mg TID. She has a resting hand splint which she cannot wear due to the tone.  I explained that the objective is to relieve the spasticity enough that the pt can don the splint for extended periods which would likely help with her tone.      Will plant for patient to return in next few weeks to perform Botox injections to LUE and LLE.         2. 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       3. Type 2 diabetes mellitus with stage 3 chronic kidney disease, without long-term current use of insulin -- controlled acc to pt/family     4. CKD (chronic kidney disease) stage 4, GFR 15-29 ml/min -- stable       Plan:       RTC once Botox is apporved. 300units.  More than 25 mins was spent with the patient with more than half that time used to discuss the pt's diagnoses, interventions and  treatment plan.

## 2017-11-13 ENCOUNTER — OFFICE VISIT (OUTPATIENT)
Dept: GASTROENTEROLOGY | Facility: CLINIC | Age: 57
End: 2017-11-13
Payer: MEDICARE

## 2017-11-13 VITALS
WEIGHT: 167 LBS | HEART RATE: 58 BPM | HEIGHT: 63 IN | DIASTOLIC BLOOD PRESSURE: 72 MMHG | BODY MASS INDEX: 29.59 KG/M2 | SYSTOLIC BLOOD PRESSURE: 169 MMHG

## 2017-11-13 DIAGNOSIS — N18.30 STAGE 3 CHRONIC KIDNEY DISEASE: ICD-10-CM

## 2017-11-13 DIAGNOSIS — Z87.11 HISTORY OF PEPTIC ULCER DISEASE: ICD-10-CM

## 2017-11-13 DIAGNOSIS — G81.14 LEFT SPASTIC HEMIPARESIS: ICD-10-CM

## 2017-11-13 DIAGNOSIS — D64.9 NORMOCHROMIC NORMOCYTIC ANEMIA: Primary | ICD-10-CM

## 2017-11-13 DIAGNOSIS — Z93.1 PEG (PERCUTANEOUS ENDOSCOPIC GASTROSTOMY) STATUS: ICD-10-CM

## 2017-11-13 PROCEDURE — 99204 OFFICE O/P NEW MOD 45 MIN: CPT | Mod: S$GLB,,, | Performed by: INTERNAL MEDICINE

## 2017-11-13 PROCEDURE — 99499 UNLISTED E&M SERVICE: CPT | Mod: S$GLB,,, | Performed by: INTERNAL MEDICINE

## 2017-11-13 PROCEDURE — 99999 PR PBB SHADOW E&M-EST. PATIENT-LVL III: CPT | Mod: PBBFAC,,, | Performed by: INTERNAL MEDICINE

## 2017-11-13 NOTE — LETTER
November 13, 2017      Beverly Muniz MD  [address]  [facility] [address]           [facility]  [address] [place] [address]  [place] [address]  Phone: [phone]          Patient: Lucy Perez   MR Number: [id number]   YOB: 1960   Date of Visit: 11/13/2017       Dear Dr. Beverly Muniz:    Thank you for referring Lucy Perez to me for evaluation. Attached you will find relevant portions of my assessment and plan of care.    If you have questions, please do not hesitate to call me. I look forward to following Lucy Perez along with you.    Sincerely,    Dallas Lock Jr., MD    Enclosure  CC:  No Recipients    If you would like to receive this communication electronically, please contact [email or web address] or [phone] to request more information on [employer] Link access.    For providers and/or their staff who would like to refer a patient to [facility], please contact us through our one-stop-shop provider referral line, [facility], at [phone].    If you feel you have received this communication in error or would no longer like to receive these types of communications, please e-mail [email or web address]

## 2017-11-13 NOTE — PATIENT INSTRUCTIONS

## 2017-11-13 NOTE — PROGRESS NOTES
Subjective:      Patient ID: Lucy Perez is a 57 y.o. female.    Chief Complaint: Abdominal Pain; Anemia; and Constipation    HPI:   Patient 57-year-old female about 15 months status post CVA in the middle cerebral artery distribution rendering her left hemiparetic.  Consult is in reference to anemia.  She's had a rather persistent anemia for many months.  Iron studies have been normal.  Thus far failed to respond erythropoietin.  On oral iron supplements as well.  Most recent hematocrit was 24.  She has remote history of peptic ulcer disease.  No NSAID use.  On Pepcid.  In August 2016 patient CVA with hemorrhagic conversion requiring craniotomy.  Has been fed with a PEG tube.  Currently tolerating some oral intake as well.  Past medical history includes chronic renal insufficiency, diabetes, hypertension, sarcoid, obstructive sleep apnea  Prior colonoscopy due to history of polyps.  Most recent colonoscopy was August 2016.  One tiny polyp was removed.  Follow-up due in 4 years.      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    Cortical cataract of both eyes 7/26/2016    Degeneration of lumbar or lumbosacral intervertebral disc 3/5/2013    S/p MRI L-spine 5/2009     Depression     General anesthetics causing adverse effect in therapeutic use     Hemorrhagic cerebrovascular accident (CVA)     8/2016 s/p Hemicraniot[facility] [place] [facility] [place] with Left hemiparesis    History of stroke 6/28/2017    s/p R-MCA stroke with R-putaminal hemorrhagic  transformation in 8/2016 and 11/2016 ([place] [facility] [place] residual L hemiparesis, on AED s/p CVA      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    Hyperlipidemia     Hypertensive retinopathy of both eyes 7/26/2016    Impingement syndrome of right shoulder 7/2/2014    Left ventricular diastolic dysfunction with preserved systolic function 12/15/2015    Mixed anxiety and depressive disorder 3/5/2013    Obesity     THERESA (obstructive sleep apnea) 3/5/2013    No Home CPAP 2ndary to cost     Partial symptomatic epilepsy with complex partial seizures, not intractable, without status epilepticus     PEG (percutaneous endoscopic gastrostomy) status 6/28/2017    Renovascular hypertension 3/5/2013    Will resume home medications: amlodipine, labetalol, and hydralazine Will hold Lisinopril in setting of DARLING.   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, without long-term current use of insulin 8/2/2017    Type 2 diabetes mellitus with diabetic polyneuropathy, without long-term current use of insulin 10/22/2015    Type 2 diabetes mellitus with stage 3 chronic kidney disease, without long-term current use of insulin 10/22/2015    Vertebral artery stenosis 3/5/2013    S/p Stenting per Dr Burnett      Past Surgical History:   Procedure Laterality Date    BREAST SURGERY      breast reduction    COLONOSCOPY N/A 8/11/2016    Procedure: COLONOSCOPY;  Surgeon: Jerry Vilchis MD;  Location: [facility] ([address] [place] [address]);  Service: Endoscopy;  Laterality: N/A;  Patient reports difficulty awaking from anesthesia in the past.    HYSTERECTOMY      ROTATOR CUFF REPAIR Right July 9, 2014    right side    stent placed      in vertebral  "artery    TUBAL LIGATION       Family History   Problem Relation Age of Onset    Cancer Mother 55     Breast cancer    Diabetes Mother     Hypertension Mother     Heart disease Mother     Heart attack Mother     Stroke Sister     Hypertension Sister     Sleep apnea Sister     No Known Problems Daughter     No Known Problems Son     Bell's palsy Sister     Lupus Sister     Blindness Maternal Grandmother     Diabetes       "My entire family family has diabetes"    Stroke Maternal Aunt     Amblyopia Neg Hx     Cataracts Neg Hx     Glaucoma Neg Hx     Macular degeneration Neg Hx     Retinal detachment Neg Hx     Strabismus Neg Hx      Social History     Social History    Marital status:      Spouse name: N/A    Number of children: N/A    Years of education: N/A     Occupational History    Not on file.     Social History Main Topics    Smoking status: Never Smoker    Smokeless tobacco: Never Used    Alcohol use No      Comment: occasional wine cooler     Drug use: No    Sexual activity: Yes     Partners: Male     Other Topics Concern    Not on file     Social History Narrative    . 2 children. Does not work. Previously worked as a .        Review of Systems:  Patient unable to provide a systems review    Objective:     BP (!) 169/72 (BP Location: Right arm, Patient Position: Sitting)   Pulse (!) 58   Ht 5' 3" (1.6 m)   Wt 75.8 kg (167 lb)   LMP  (Exact Date)   BMI 29.58 kg/m²     Physical Exam:  Eyes: Pupils are equal, round, and reactive to light.   Neck: Supple. No mass  Cardiovascular: Regular rhythm . No murmur   Pulmonary/Chest: Lungs clear   Abdominal: Soft. No mass palpated. Nontender, no guarding. Positive bowel sounds   Musculoskeletal: No deformity. No edema.   Psychiatric: Alert and oriented    Assessment:     1. Normochromic normocytic anemia    2. History of peptic ulcer disease    3. Stage 3 chronic kidney disease    4. Left spastic " hemiparesis    5. PEG (percutaneous endoscopic gastrostomy) status      Plan:     Lucy was seen today for abdominal pain, anemia and constipation.    Diagnoses and all orders for this visit:    Normochromic normocytic anemia  -     Case request GI: ESOPHAGOGASTRODUODENOSCOPY (EGD)    History of peptic ulcer disease  -     Case request GI: ESOPHAGOGASTRODUODENOSCOPY (EGD)    Stage 3 chronic kidney disease    Left spastic hemiparesis    PEG (percutaneous endoscopic gastrostomy) status      Plan:  Continue Pepcid  EGD to rule out recurrent ulcer disease  Continue erythropoietin, iron.

## 2017-11-14 RX ORDER — DANTROLENE SODIUM 25 MG/1
50 CAPSULE ORAL 3 TIMES DAILY
Qty: 180 CAPSULE | Refills: 3 | OUTPATIENT
Start: 2017-11-14 | End: 2018-11-14

## 2017-11-17 ENCOUNTER — LAB VISIT (OUTPATIENT)
Dept: LAB | Facility: HOSPITAL | Age: 57
End: 2017-11-17
Attending: INTERNAL MEDICINE
Payer: MEDICARE

## 2017-11-17 DIAGNOSIS — D64.9 ANEMIA, UNSPECIFIED TYPE: ICD-10-CM

## 2017-11-17 PROCEDURE — 82274 ASSAY TEST FOR BLOOD FECAL: CPT

## 2017-11-20 DIAGNOSIS — M62.838 MUSCLE SPASM: Primary | ICD-10-CM

## 2017-11-20 LAB — HEMOCCULT STL QL IA: NEGATIVE

## 2017-11-20 RX ORDER — DANTROLENE SODIUM 25 MG/1
50 CAPSULE ORAL 3 TIMES DAILY
Qty: 180 CAPSULE | Refills: 3 | Status: SHIPPED | OUTPATIENT
Start: 2017-11-20 | End: 2018-01-03 | Stop reason: SDUPTHER

## 2017-11-20 NOTE — TELEPHONE ENCOUNTER
Please refill for the patient  LOV--11/09/2017      Dr. Pardo I will need for you to put orders/referral in for the patient for Botox so it can be approved.  The will be coming next month for the apt.

## 2017-11-24 ENCOUNTER — LAB VISIT (OUTPATIENT)
Dept: LAB | Facility: HOSPITAL | Age: 57
End: 2017-11-24
Attending: INTERNAL MEDICINE
Payer: MEDICARE

## 2017-11-24 DIAGNOSIS — N18.6 TYPE 2 DIABETES MELLITUS WITH END-STAGE RENAL DISEASE: Primary | ICD-10-CM

## 2017-11-24 DIAGNOSIS — E11.22 TYPE 2 DIABETES MELLITUS WITH END-STAGE RENAL DISEASE: Primary | ICD-10-CM

## 2017-11-24 LAB
BASOPHILS # BLD AUTO: 0.02 K/UL
BASOPHILS NFR BLD: 0.4 %
DIFFERENTIAL METHOD: ABNORMAL
EOSINOPHIL # BLD AUTO: 0.2 K/UL
EOSINOPHIL NFR BLD: 3.4 %
ERYTHROCYTE [DISTWIDTH] IN BLOOD BY AUTOMATED COUNT: 14 %
ESTIMATED AVG GLUCOSE: 91 MG/DL
HBA1C MFR BLD HPLC: 4.8 %
HCT VFR BLD AUTO: 22.5 %
HGB BLD-MCNC: 6.8 G/DL
HGB BLD-MCNC: 6.8 G/DL
IMM GRANULOCYTES # BLD AUTO: 0.01 K/UL
IMM GRANULOCYTES NFR BLD AUTO: 0.2 %
LYMPHOCYTES # BLD AUTO: 0.7 K/UL
LYMPHOCYTES NFR BLD: 14.4 %
MCH RBC QN AUTO: 29.4 PG
MCHC RBC AUTO-ENTMCNC: 30.2 G/DL
MCV RBC AUTO: 97 FL
MONOCYTES # BLD AUTO: 0.4 K/UL
MONOCYTES NFR BLD: 8 %
NEUTROPHILS # BLD AUTO: 3.5 K/UL
NEUTROPHILS NFR BLD: 73.6 %
NRBC BLD-RTO: 0 /100 WBC
PLATELET # BLD AUTO: 174 K/UL
PMV BLD AUTO: 11.4 FL
RBC # BLD AUTO: 2.31 M/UL
WBC # BLD AUTO: 4.73 K/UL

## 2017-11-24 PROCEDURE — 85025 COMPLETE CBC W/AUTO DIFF WBC: CPT

## 2017-11-24 PROCEDURE — 83036 HEMOGLOBIN GLYCOSYLATED A1C: CPT

## 2017-11-27 ENCOUNTER — TELEPHONE (OUTPATIENT)
Dept: INTERNAL MEDICINE | Facility: CLINIC | Age: 57
End: 2017-11-27

## 2017-11-27 DIAGNOSIS — E78.00 PURE HYPERCHOLESTEROLEMIA: ICD-10-CM

## 2017-11-27 RX ORDER — ATORVASTATIN CALCIUM 40 MG/1
TABLET, FILM COATED ORAL
Qty: 90 TABLET | Refills: 2 | Status: SHIPPED | OUTPATIENT
Start: 2017-11-27 | End: 2018-07-25 | Stop reason: SDUPTHER

## 2017-11-27 NOTE — TELEPHONE ENCOUNTER
Cecy, please call Mrs Ayala's daughter, Reian and let her know that the FIT Stool test for blood was negative.  Thanks

## 2017-11-28 ENCOUNTER — TELEPHONE (OUTPATIENT)
Dept: HEMATOLOGY/ONCOLOGY | Facility: CLINIC | Age: 57
End: 2017-11-28

## 2017-11-28 ENCOUNTER — TELEPHONE (OUTPATIENT)
Dept: ADMINISTRATIVE | Facility: CLINIC | Age: 57
End: 2017-11-28

## 2017-11-28 DIAGNOSIS — N18.30 ANEMIA IN STAGE 3 CHRONIC KIDNEY DISEASE: Primary | ICD-10-CM

## 2017-11-28 DIAGNOSIS — D63.1 ANEMIA IN STAGE 3 CHRONIC KIDNEY DISEASE: Primary | ICD-10-CM

## 2017-11-28 NOTE — TELEPHONE ENCOUNTER
----- Message from Leopoldo Owen sent at 11/28/2017  3:26 PM CST -----  Contact: Dr.[place]   Will like a call from Dr. Owen regarding mutual pt     Contact::[phone]  Message given to Dr. Owen, stated he would return call.  Adelaide

## 2017-11-28 NOTE — TELEPHONE ENCOUNTER
I have spoken with Dr MADELEINE Owen in Heme/Onc ie pt's drop in H/H to 6.8/22.5%.  He has scheduled pt for a transfusion to treat this and has a follow up appt scheduled. She sees me in January 2018.

## 2017-11-28 NOTE — TELEPHONE ENCOUNTER
----- Message from Leopoldo Owen sent at 11/28/2017 11:10 AM CST -----  Contact: Francheska Retaslong [facility]   Pt's hemoglobin is at 6.8. Will like to know what pt should do     Contact::[phone]  Spoke with pt's daughter and explained that Dr. Owen would like her mother to come in for a blood transfusion, pt has been scheduled for appointment on 11/30/17, labs due at 1pm and transfusion at 3pm, daughter verbalized understanding.   Adelaide

## 2017-12-04 ENCOUNTER — INFUSION (OUTPATIENT)
Dept: INFUSION THERAPY | Facility: HOSPITAL | Age: 57
End: 2017-12-04
Attending: INTERNAL MEDICINE
Payer: MEDICARE

## 2017-12-04 ENCOUNTER — OFFICE VISIT (OUTPATIENT)
Dept: HEMATOLOGY/ONCOLOGY | Facility: CLINIC | Age: 57
End: 2017-12-04
Payer: MEDICARE

## 2017-12-04 ENCOUNTER — TELEPHONE (OUTPATIENT)
Dept: PHYSICAL MEDICINE AND REHAB | Facility: CLINIC | Age: 57
End: 2017-12-04

## 2017-12-04 ENCOUNTER — LAB VISIT (OUTPATIENT)
Dept: LAB | Facility: HOSPITAL | Age: 57
End: 2017-12-04
Attending: INTERNAL MEDICINE
Payer: MEDICARE

## 2017-12-04 VITALS
OXYGEN SATURATION: 94 % | RESPIRATION RATE: 18 BRPM | SYSTOLIC BLOOD PRESSURE: 183 MMHG | HEIGHT: 63 IN | TEMPERATURE: 98 F | HEART RATE: 62 BPM | DIASTOLIC BLOOD PRESSURE: 77 MMHG

## 2017-12-04 DIAGNOSIS — N18.30 ANEMIA IN STAGE 3 CHRONIC KIDNEY DISEASE: ICD-10-CM

## 2017-12-04 DIAGNOSIS — N18.30 ANEMIA IN STAGE 3 CHRONIC KIDNEY DISEASE: Primary | ICD-10-CM

## 2017-12-04 DIAGNOSIS — D63.1 ANEMIA IN STAGE 3 CHRONIC KIDNEY DISEASE: Primary | ICD-10-CM

## 2017-12-04 DIAGNOSIS — D63.1 ANEMIA IN STAGE 3 CHRONIC KIDNEY DISEASE: ICD-10-CM

## 2017-12-04 DIAGNOSIS — R25.2 SPASTICITY: ICD-10-CM

## 2017-12-04 DIAGNOSIS — M62.838 MUSCLE SPASM: Primary | ICD-10-CM

## 2017-12-04 DIAGNOSIS — E53.8 FOLIC ACID DEFICIENCY: ICD-10-CM

## 2017-12-04 LAB
ABO + RH BLD: NORMAL
ALBUMIN SERPL BCP-MCNC: 3.9 G/DL
ALP SERPL-CCNC: 93 U/L
ALT SERPL W/O P-5'-P-CCNC: 6 U/L
ANION GAP SERPL CALC-SCNC: 9 MMOL/L
AST SERPL-CCNC: 8 U/L
BASOPHILS # BLD AUTO: 0.04 K/UL
BASOPHILS NFR BLD: 1 %
BILIRUB SERPL-MCNC: 0.4 MG/DL
BLD GP AB SCN CELLS X3 SERPL QL: NORMAL
BUN SERPL-MCNC: 36 MG/DL
CALCIUM SERPL-MCNC: 9.6 MG/DL
CHLORIDE SERPL-SCNC: 117 MMOL/L
CO2 SERPL-SCNC: 19 MMOL/L
CREAT SERPL-MCNC: 1.7 MG/DL
DIFFERENTIAL METHOD: ABNORMAL
EOSINOPHIL # BLD AUTO: 0.2 K/UL
EOSINOPHIL NFR BLD: 3.6 %
ERYTHROCYTE [DISTWIDTH] IN BLOOD BY AUTOMATED COUNT: 14.1 %
EST. GFR  (AFRICAN AMERICAN): 37.5 ML/MIN/1.73 M^2
EST. GFR  (NON AFRICAN AMERICAN): 32.5 ML/MIN/1.73 M^2
GLUCOSE SERPL-MCNC: 147 MG/DL
HCT VFR BLD AUTO: 26.7 %
HGB BLD-MCNC: 8.1 G/DL
IMM GRANULOCYTES # BLD AUTO: 0.01 K/UL
IMM GRANULOCYTES NFR BLD AUTO: 0.2 %
LYMPHOCYTES # BLD AUTO: 0.7 K/UL
LYMPHOCYTES NFR BLD: 16.9 %
MCH RBC QN AUTO: 29.5 PG
MCHC RBC AUTO-ENTMCNC: 30.3 G/DL
MCV RBC AUTO: 97 FL
MONOCYTES # BLD AUTO: 0.2 K/UL
MONOCYTES NFR BLD: 5.6 %
NEUTROPHILS # BLD AUTO: 3 K/UL
NEUTROPHILS NFR BLD: 72.7 %
NRBC BLD-RTO: 0 /100 WBC
PLATELET # BLD AUTO: 201 K/UL
PMV BLD AUTO: 11.1 FL
POTASSIUM SERPL-SCNC: 4.6 MMOL/L
PROT SERPL-MCNC: 7.5 G/DL
RBC # BLD AUTO: 2.75 M/UL
RETICS/RBC NFR AUTO: 1.6 %
SODIUM SERPL-SCNC: 145 MMOL/L
WBC # BLD AUTO: 4.14 K/UL

## 2017-12-04 PROCEDURE — 84165 PROTEIN E-PHORESIS SERUM: CPT

## 2017-12-04 PROCEDURE — 99499 UNLISTED E&M SERVICE: CPT | Mod: S$GLB,,, | Performed by: INTERNAL MEDICINE

## 2017-12-04 PROCEDURE — 83520 IMMUNOASSAY QUANT NOS NONAB: CPT | Mod: 59

## 2017-12-04 PROCEDURE — 96372 THER/PROPH/DIAG INJ SC/IM: CPT

## 2017-12-04 PROCEDURE — 36415 COLL VENOUS BLD VENIPUNCTURE: CPT

## 2017-12-04 PROCEDURE — 86334 IMMUNOFIX E-PHORESIS SERUM: CPT | Mod: 26,,, | Performed by: PATHOLOGY

## 2017-12-04 PROCEDURE — 84165 PROTEIN E-PHORESIS SERUM: CPT | Mod: 26,,, | Performed by: PATHOLOGY

## 2017-12-04 PROCEDURE — 86901 BLOOD TYPING SEROLOGIC RH(D): CPT

## 2017-12-04 PROCEDURE — 86334 IMMUNOFIX E-PHORESIS SERUM: CPT

## 2017-12-04 PROCEDURE — 99213 OFFICE O/P EST LOW 20 MIN: CPT | Mod: S$GLB,,, | Performed by: INTERNAL MEDICINE

## 2017-12-04 PROCEDURE — 85025 COMPLETE CBC W/AUTO DIFF WBC: CPT

## 2017-12-04 PROCEDURE — 63600175 PHARM REV CODE 636 W HCPCS: Performed by: INTERNAL MEDICINE

## 2017-12-04 PROCEDURE — 99999 PR PBB SHADOW E&M-EST. PATIENT-LVL III: CPT | Mod: PBBFAC,,, | Performed by: INTERNAL MEDICINE

## 2017-12-04 PROCEDURE — 85045 AUTOMATED RETICULOCYTE COUNT: CPT

## 2017-12-04 PROCEDURE — 80053 COMPREHEN METABOLIC PANEL: CPT

## 2017-12-04 RX ADMIN — DARBEPOETIN ALFA 40 MCG: 40 INJECTION, SOLUTION INTRAVENOUS; SUBCUTANEOUS at 12:12

## 2017-12-04 NOTE — ASSESSMENT & PLAN NOTE
Hgb is stable today, with slight improvement to 8.1. She will continue darbepoetin injections. She may need a higher dose, as she cannot come more frequently for darbepoetin. We will continue to follow this.

## 2017-12-04 NOTE — Clinical Note
Please schedule darbepoetin injections in 4 and 8 weeks. Labs before each visit (CBC, BMP, Retic, type and screen)  Return visit with me in 8 weeks (same day as injection). Thanks.

## 2017-12-04 NOTE — PROGRESS NOTES
HEMATOLOGIC MALIGNANCIES PROGRESS NOTE    IDENTIFYING STATEMENT   Lucy Perez (Lucy) is a 57 y.o. female with a  of 1960 from [place] with the diagnosis of anemia.      ONCOLOGY HISTORY:    1. Anemia, likely multifactorial              A.  No normal hemoglobin on record. First valuve is from 13 (10.7 g/dl).               B. 17: Hospital follow-up CBC - hgb 7.3. WBC and plt normal. MCV 96.              C. 17: Hgb 7.2. Plt 149. Retic 1.7%. Iron studies normal. Folic acid low (3.4). B12 normal.    D. 10/9/17: Hgb 8.3. Folate 13.1.    E. 17: Hgb 7.5 g/dl.    F. 2017: Hgb 8.1 g/dl.      2. History of R-MCA stroke with R putaminal hemorrhagic transformation and residual L-sided deficits.  3. S/p PEG  4. Chronic kidney disease, Stage III  5. Sarcoidosis    INTERVAL HISTORY:      Ms. Perez returns to clinic for follow-up of her anemia. Since her last visit, there is not significant change. She is not generally feeling well, and she has pain on the left side of her face and arm, which is chronic. She is on darbepoetin injections.     Past Medical History, Past Social History and Past Family History have been reviewed and are unchanged except as noted in the interval history.    MEDICATIONS:     Prior to Admission medications    Medication Sig Start Date End Date Taking? Authorizing Provider   albuterol (PROVENTIL) 2.5 mg /3 mL (0.083 %) nebulizer solution Take 3 mLs (2.5 mg total) by nebulization every 6 (six) hours as needed for Wheezing. Rescue 17  Beverly Muniz MD   alprazolam (XANAX) 0.5 MG tablet 1/2 to 1 tab Q8 hours as needed for anxiety 17   Beverly Muniz MD   amlodipine (NORVASC) 10 MG tablet Take 1 tablet (10 mg total) by mouth once daily. 17  Beverly Muniz MD   aspirin (ECOTRIN) 81 MG EC tablet Take 81 mg by mouth once daily.      Historical Provider, MD   atorvastatin (LIPITOR) 40 MG tablet Take 1 tablet (40 mg  "total) by mouth once daily. For Cholesterol 10/20/16   Beverly Muniz MD   BD INSULIN PEN NEEDLE UF SHORT 31 gauge x 5/16" Ndle USE TO INJECT NOVOLOG FLEXPEN BEFORE MEALS 5/29/17   Beatriz Aguilera DNP, NP   blood sugar diagnostic (ACCU-CHEK SMARTVIEW TEST STRIP) Strp 1 strip by Misc.(Non-Drug; Combo Route) route 2 (two) times daily. 11/5/15   Beatriz Aguilera DNP, NP   clotrimazole-betamethasone 1-0.05% (LOTRISONE) cream Apply topically 2 (two) times daily. Apply to area of rash/iting on buttocks 1-2x/day as needed 8/10/17   Beverly Muniz MD   colchicine 0.6 mg tablet 1 tab daily as needed for gout flare 6/16/16   Beverly Muniz MD   famotidine (PEPCID) 20 MG tablet TAKE 1 TABLET (20 MG TOTAL) BY MOUTH ONCE DAILY. 5/11/17   Beverly Muniz MD   ferrous sulfate 220 mg (44 mg iron)/5 mL solution 10ml daily for Iron/Give via PEG 8/23/17   Beverly Muniz MD   fluoxetine 20 MG tablet Take 1 tablet (20 mg total) by mouth once daily. 9/21/17   Beverly Muniz MD   folic acid (FOLVITE) 1 MG tablet Take 1 tablet (1 mg total) by mouth once daily. 9/14/17 9/14/18  Beverly Muniz MD   hydrALAZINE (APRESOLINE) 100 MG tablet Take 1 tablet (100 mg total) by mouth 3 (three) times daily. 2/22/17   Beverly Muniz MD   hydrochlorothiazide (HYDRODIURIL) 25 MG tablet Take 25 mg by mouth once daily.    Historical Provider, MD   insulin detemir (LEVEMIR FLEXTOUCH) 100 unit/mL (3 mL) SubQ InPn pen 14 units in AM  Patient taking differently: Inject 14 Units into the skin every evening.  7/12/17   Beverly Muniz MD   labetalol (NORMODYNE) 100 MG tablet Take 500 mg by mouth every 8 (eight) hours.    Historical Provider, MD   levetiracetam (KEPPRA) 500 MG Tab Take 1 tablet (500 mg total) by mouth every 8 (eight) hours. 8/10/17 8/10/18  Beverly Muniz MD   multivitamin with iron Tab 1/day  Patient taking differently: Take 1 tablet by mouth once daily.  6/20/16   Beverly Muniz MD " "  nut.tx.gluc.intol,lac-free,soy (GLUCERNA 1.5 NOAH) Liqd 1.5 cans in AM, 1 can at Noon , 1.5cans at Dinner, and 1 can before Bed/Total 5 cans daily 11/25/16   Beverly Muniz MD   nystatin (MYCOSTATIN) powder Apply topically 2 (two) times daily. Apply to irritated skin 2x/day as needed 8/10/17   Beverly Muniz MD   oxcarbazepine (TRILEPTAL) 150 MG Tab Take 1 tablet (150 mg total) by mouth 2 (two) times daily. 9/15/17 9/15/18  Pola Galloway MD   phenytoin (DILANTIN) 300 MG ER capsule Take 300 mg by mouth every evening.    Historical Provider, MD       ALLERGIES:   Review of patient's allergies indicates:   Allergen Reactions    Lisinopril Other (See Comments)     Angioedema      Vicodin [hydrocodone-acetaminophen] Rash        ROS:       Review of Systems   Constitutional: Negative for diaphoresis, fatigue, fever and unexpected weight change.   HENT:   Negative for lump/mass and sore throat.    Eyes: Negative for icterus.   Respiratory: Negative for cough and shortness of breath.    Cardiovascular: Negative for chest pain and palpitations.   Gastrointestinal: Negative for abdominal distention, constipation, diarrhea, nausea and vomiting.   Genitourinary: Negative for dysuria and frequency.    Musculoskeletal: Negative for arthralgias, gait problem and myalgias.   Skin: Negative for rash.   Neurological: Positive for headaches. Negative for dizziness and gait problem.   Hematological: Negative for adenopathy. Does not bruise/bleed easily.   Psychiatric/Behavioral: The patient is not nervous/anxious.        PHYSICAL EXAM:  Vitals:    12/04/17 1113   BP: (!) 183/77   Pulse: 62   Resp: 18   Temp: 98.4 °F (36.9 °C)   TempSrc: Oral   SpO2: (!) 94%   Height: 5' 3" (1.6 m)   PainSc:   6       Physical Exam   Constitutional: She is oriented to person, place, and time. She appears well-developed and well-nourished. No distress.   She is in a wheelchair.   HENT:   Mouth/Throat: Mucous membranes are normal. No oral " lesions.   S/p craniotomy   Eyes: Conjunctivae are normal.   Neck: No thyromegaly present.   Cardiovascular: Normal rate, regular rhythm and normal heart sounds.    No murmur heard.  Pulmonary/Chest: Breath sounds normal. She has no wheezes. She has no rales.   Abdominal: Soft. She exhibits no distension and no mass. There is no splenomegaly or hepatomegaly. There is no tenderness.   Lymphadenopathy:     She has no cervical adenopathy.        Right cervical: No deep cervical adenopathy present.       Left cervical: No deep cervical adenopathy present.     She has no axillary adenopathy.        Right: No inguinal adenopathy present.        Left: No inguinal adenopathy present.   Neurological: She is alert and oriented to person, place, and time. She has normal strength and normal reflexes. No cranial nerve deficit. Coordination normal.   She has marked weakness on the left compared with the right. Hyperreflexive on the left.     Skin: No rash noted.     LAB:   Results for orders placed or performed in visit on 12/04/17   CBC auto differential   Result Value Ref Range    WBC 4.14 3.90 - 12.70 K/uL    RBC 2.75 (L) 4.00 - 5.40 M/uL    Hemoglobin 8.1 (L) 12.0 - 16.0 g/dL    Hematocrit 26.7 (L) 37.0 - 48.5 %    MCV 97 82 - 98 fL    MCH 29.5 27.0 - 31.0 pg    MCHC 30.3 (L) 32.0 - 36.0 g/dL    RDW 14.1 11.5 - 14.5 %    Platelets 201 150 - 350 K/uL    MPV 11.1 9.2 - 12.9 fL    Immature Granulocytes 0.2 0.0 - 0.5 %    Gran # 3.0 1.8 - 7.7 K/uL    Immature Grans (Abs) 0.01 0.00 - 0.04 K/uL    Lymph # 0.7 (L) 1.0 - 4.8 K/uL    Mono # 0.2 (L) 0.3 - 1.0 K/uL    Eos # 0.2 0.0 - 0.5 K/uL    Baso # 0.04 0.00 - 0.20 K/uL    nRBC 0 0 /100 WBC    Gran% 72.7 38.0 - 73.0 %    Lymph% 16.9 (L) 18.0 - 48.0 %    Mono% 5.6 4.0 - 15.0 %    Eosinophil% 3.6 0.0 - 8.0 %    Basophil% 1.0 0.0 - 1.9 %    Differential Method Automated    Comprehensive metabolic panel   Result Value Ref Range    Sodium 145 136 - 145 mmol/L    Potassium 4.6 3.5 - 5.1  mmol/L    Chloride 117 (H) 95 - 110 mmol/L    CO2 19 (L) 23 - 29 mmol/L    Glucose 147 (H) 70 - 110 mg/dL    BUN, Bld 36 (H) 6 - 20 mg/dL    Creatinine 1.7 (H) 0.5 - 1.4 mg/dL    Calcium 9.6 8.7 - 10.5 mg/dL    Total Protein 7.5 6.0 - 8.4 g/dL    Albumin 3.9 3.5 - 5.2 g/dL    Total Bilirubin 0.4 0.1 - 1.0 mg/dL    Alkaline Phosphatase 93 55 - 135 U/L    AST 8 (L) 10 - 40 U/L    ALT 6 (L) 10 - 44 U/L    Anion Gap 9 8 - 16 mmol/L    eGFR if African American 37.5 (A) >60 mL/min/1.73 m^2    eGFR if non  32.5 (A) >60 mL/min/1.73 m^2   Reticulocytes   Result Value Ref Range    Retic 1.6 0.5 - 2.5 %   Protein electrophoresis, serum   Result Value Ref Range    Protein, Serum 7.1 6.0 - 8.4 g/dL   Type & Screen   Result Value Ref Range    Group & Rh O POS     Indirect Cha NEG        PROBLEMS ASSESSED THIS VISIT:    1. Folic acid deficiency    2. Anemia in stage 3 chronic kidney disease        PLAN:       Anemia in stage 3 chronic kidney disease  Hgb is stable today, with slight improvement to 8.1. She will continue darbepoetin injections. She may need a higher dose, as she cannot come more frequently for darbepoetin. We will continue to follow this.     Folic acid deficiency  Previously repleted. We will follow-up in ~6 months to make sure this does not recur.     Follow-up in 8 weeks    Mike Owen MD  Hematology and Stem Cell Transplant    Distress Screening Results: Psychosocial Distress screening score of Distress Score: 7 noted and reviewed. No intervention indicated. Not oncology related.

## 2017-12-05 LAB
ALBUMIN SERPL ELPH-MCNC: 4.35 G/DL
ALPHA1 GLOB SERPL ELPH-MCNC: 0.36 G/DL
ALPHA2 GLOB SERPL ELPH-MCNC: 0.8 G/DL
B-GLOBULIN SERPL ELPH-MCNC: 0.63 G/DL
GAMMA GLOB SERPL ELPH-MCNC: 0.97 G/DL
INTERPRETATION SERPL IFE-IMP: NORMAL
KAPPA LC SER QL IA: 5.85 MG/DL
KAPPA LC/LAMBDA SER IA: 2.14
LAMBDA LC SER QL IA: 2.73 MG/DL
PATHOLOGIST INTERPRETATION IFE: NORMAL
PATHOLOGIST INTERPRETATION SPE: NORMAL
PROT SERPL-MCNC: 7.1 G/DL

## 2017-12-06 ENCOUNTER — ANESTHESIA EVENT (OUTPATIENT)
Dept: ENDOSCOPY | Facility: HOSPITAL | Age: 57
End: 2017-12-06
Payer: MEDICARE

## 2017-12-06 ENCOUNTER — ANESTHESIA (OUTPATIENT)
Dept: ENDOSCOPY | Facility: HOSPITAL | Age: 57
End: 2017-12-06
Payer: MEDICARE

## 2017-12-06 ENCOUNTER — HOSPITAL ENCOUNTER (OUTPATIENT)
Facility: HOSPITAL | Age: 57
Discharge: HOME | End: 2017-12-06
Attending: INTERNAL MEDICINE | Admitting: INTERNAL MEDICINE
Payer: MEDICARE

## 2017-12-06 VITALS
TEMPERATURE: 98 F | WEIGHT: 160 LBS | RESPIRATION RATE: 18 BRPM | HEIGHT: 63 IN | OXYGEN SATURATION: 99 % | DIASTOLIC BLOOD PRESSURE: 75 MMHG | HEART RATE: 62 BPM | BODY MASS INDEX: 28.35 KG/M2 | SYSTOLIC BLOOD PRESSURE: 160 MMHG

## 2017-12-06 DIAGNOSIS — D50.0 CHRONIC BLOOD LOSS ANEMIA: Primary | ICD-10-CM

## 2017-12-06 DIAGNOSIS — Z12.12 SCREENING FOR COLORECTAL CANCER: ICD-10-CM

## 2017-12-06 DIAGNOSIS — Z87.11 HISTORY OF PEPTIC ULCER: ICD-10-CM

## 2017-12-06 DIAGNOSIS — Z12.11 SCREENING FOR COLORECTAL CANCER: ICD-10-CM

## 2017-12-06 LAB
GLUCOSE SERPL-MCNC: 173 MG/DL (ref 70–110)
POCT GLUCOSE: 173 MG/DL (ref 70–110)

## 2017-12-06 PROCEDURE — 43239 EGD BIOPSY SINGLE/MULTIPLE: CPT | Mod: ,,, | Performed by: INTERNAL MEDICINE

## 2017-12-06 PROCEDURE — 43239 EGD BIOPSY SINGLE/MULTIPLE: CPT | Performed by: INTERNAL MEDICINE

## 2017-12-06 PROCEDURE — 25000003 PHARM REV CODE 250: Performed by: INTERNAL MEDICINE

## 2017-12-06 PROCEDURE — 27201012 HC FORCEPS, HOT/COLD, DISP: Performed by: INTERNAL MEDICINE

## 2017-12-06 PROCEDURE — 37000009 HC ANESTHESIA EA ADD 15 MINS: Performed by: INTERNAL MEDICINE

## 2017-12-06 PROCEDURE — 63600175 PHARM REV CODE 636 W HCPCS: Performed by: NURSE ANESTHETIST, CERTIFIED REGISTERED

## 2017-12-06 PROCEDURE — 88305 TISSUE EXAM BY PATHOLOGIST: CPT | Mod: 26,,, | Performed by: PATHOLOGY

## 2017-12-06 PROCEDURE — 37000008 HC ANESTHESIA 1ST 15 MINUTES: Performed by: INTERNAL MEDICINE

## 2017-12-06 PROCEDURE — 88305 TISSUE EXAM BY PATHOLOGIST: CPT | Performed by: PATHOLOGY

## 2017-12-06 RX ORDER — LIDOCAINE HCL/PF 100 MG/5ML
SYRINGE (ML) INTRAVENOUS
Status: DISCONTINUED | OUTPATIENT
Start: 2017-12-06 | End: 2017-12-06

## 2017-12-06 RX ORDER — PROPOFOL 10 MG/ML
VIAL (ML) INTRAVENOUS
Status: DISCONTINUED | OUTPATIENT
Start: 2017-12-06 | End: 2017-12-06

## 2017-12-06 RX ORDER — SODIUM CHLORIDE 9 MG/ML
INJECTION, SOLUTION INTRAVENOUS CONTINUOUS
Status: DISCONTINUED | OUTPATIENT
Start: 2017-12-06 | End: 2017-12-06 | Stop reason: HOSPADM

## 2017-12-06 RX ORDER — FENTANYL CITRATE 50 UG/ML
INJECTION, SOLUTION INTRAMUSCULAR; INTRAVENOUS
Status: DISCONTINUED | OUTPATIENT
Start: 2017-12-06 | End: 2017-12-06

## 2017-12-06 RX ORDER — PROPOFOL 10 MG/ML
VIAL (ML) INTRAVENOUS CONTINUOUS PRN
Status: DISCONTINUED | OUTPATIENT
Start: 2017-12-06 | End: 2017-12-06

## 2017-12-06 RX ADMIN — LIDOCAINE HYDROCHLORIDE 80 MG: 20 INJECTION, SOLUTION INTRAVENOUS at 09:12

## 2017-12-06 RX ADMIN — PROPOFOL 40 MG: 10 INJECTION, EMULSION INTRAVENOUS at 09:12

## 2017-12-06 RX ADMIN — SODIUM CHLORIDE: 0.9 INJECTION, SOLUTION INTRAVENOUS at 08:12

## 2017-12-06 RX ADMIN — FENTANYL CITRATE 25 MCG: 50 INJECTION, SOLUTION INTRAMUSCULAR; INTRAVENOUS at 09:12

## 2017-12-06 RX ADMIN — PROPOFOL 125 MCG/KG/MIN: 10 INJECTION, EMULSION INTRAVENOUS at 09:12

## 2017-12-06 NOTE — TRANSFER OF CARE
"Anesthesia Transfer of Care Note    Patient: Lucy Perez    Procedure(s) Performed: Procedure(s) (LRB):  ESOPHAGOGASTRODUODENOSCOPY (EGD) (N/A)    Patient location: GI    Anesthesia Type: MAC    Transport from OR: Transported from OR on room air with adequate spontaneous ventilation    Post pain: adequate analgesia    Post assessment: no apparent anesthetic complications and tolerated procedure well    Post vital signs: stable    Level of consciousness: awake, alert and oriented    Nausea/Vomiting: no nausea/vomiting    Complications: none    Transfer of care protocol was followed      Last vitals:   Visit Vitals  BP (!) 175/78   Pulse 67   Temp 36.7 °C (98.1 °F)   Resp 20   Ht 5' 3" (1.6 m)   Wt 72.6 kg (160 lb)   LMP  (Exact Date)   SpO2 98%   Breastfeeding? No   BMI 28.34 kg/m²     "

## 2017-12-06 NOTE — H&P (VIEW-ONLY)
Subjective:      Patient ID: Lucy Perez is a 57 y.o. female.    Chief Complaint: Abdominal Pain; Anemia; and Constipation    HPI:   Patient 57-year-old female about 15 months status post CVA in the middle cerebral artery distribution rendering her left hemiparetic.  Consult is in reference to anemia.  She's had a rather persistent anemia for many months.  Iron studies have been normal.  Thus far failed to respond erythropoietin.  On oral iron supplements as well.  Most recent hematocrit was 24.  She has remote history of peptic ulcer disease.  No NSAID use.  On Pepcid.  In August 2016 patient CVA with hemorrhagic conversion requiring craniotomy.  Has been fed with a PEG tube.  Currently tolerating some oral intake as well.  Past medical history includes chronic renal insufficiency, diabetes, hypertension, sarcoid, obstructive sleep apnea  Prior colonoscopy due to history of polyps.  Most recent colonoscopy was August 2016.  One tiny polyp was removed.  Follow-up due in 4 years.      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    Cortical cataract of both eyes 7/26/2016    Degeneration of lumbar or lumbosacral intervertebral disc 3/5/2013    S/p MRI L-spine 5/2009     Depression     General anesthetics causing adverse effect in therapeutic use     Hemorrhagic cerebrovascular accident (CVA)     8/2016 s/p Hemicraniotomy at [facility] [place] with Left hemiparesis    History of stroke 6/28/2017    s/p R-MCA stroke with R-putaminal hemorrhagic  transformation in 8/2016 and 11/2016 (s/p hemicraniotomy at [facility] [place]) with residual L hemiparesis, on AED s/p CVA      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    Hyperlipidemia     Hypertensive retinopathy of both eyes 7/26/2016    Impingement syndrome of right shoulder 7/2/2014    Left ventricular diastolic dysfunction with preserved systolic function 12/15/2015    Mixed anxiety and depressive disorder 3/5/2013    Obesity     THERESA (obstructive sleep apnea) 3/5/2013    No Home CPAP 2ndary to cost     Partial symptomatic epilepsy with complex partial seizures, not intractable, without status epilepticus     PEG (percutaneous endoscopic gastrostomy) status 6/28/2017    Renovascular hypertension 3/5/2013    Will resume home medications: amlodipine, labetalol, and hydralazine Will hold Lisinopril in setting of DARLING.   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, without long-term current use of insulin 8/2/2017    Type 2 diabetes mellitus with diabetic polyneuropathy, without long-term current use of insulin 10/22/2015    Type 2 diabetes mellitus with stage 3 chronic kidney disease, without long-term current use of insulin 10/22/2015    Vertebral artery stenosis 3/5/2013    S/p Stenting per Dr Burnett      Past Surgical History:   Procedure Laterality Date    BREAST SURGERY      breast reduction    COLONOSCOPY N/A 8/11/2016    Procedure: COLONOSCOPY;  Surgeon: Jerry Vilchis MD;  Location: [facility] ([address] [place] [address]);  Service: Endoscopy;  Laterality: N/A;  Patient reports difficulty awaking from anesthesia in the past.    HYSTERECTOMY      ROTATOR CUFF REPAIR Right July 9, 2014    right side    stent placed      in vertebral  "artery    TUBAL LIGATION       Family History   Problem Relation Age of Onset    Cancer Mother 55     Breast cancer    Diabetes Mother     Hypertension Mother     Heart disease Mother     Heart attack Mother     Stroke Sister     Hypertension Sister     Sleep apnea Sister     No Known Problems Daughter     No Known Problems Son     Bell's palsy Sister     Lupus Sister     Blindness Maternal Grandmother     Diabetes       "My entire family family has diabetes"    Stroke Maternal Aunt     Amblyopia Neg Hx     Cataracts Neg Hx     Glaucoma Neg Hx     Macular degeneration Neg Hx     Retinal detachment Neg Hx     Strabismus Neg Hx      Social History     Social History    Marital status:      Spouse name: N/A    Number of children: N/A    Years of education: N/A     Occupational History    Not on file.     Social History Main Topics    Smoking status: Never Smoker    Smokeless tobacco: Never Used    Alcohol use No      Comment: occasional wine cooler     Drug use: No    Sexual activity: Yes     Partners: Male     Other Topics Concern    Not on file     Social History Narrative    . 2 children. Does not work. Previously worked as a .        Review of Systems:  Patient unable to provide a systems review    Objective:     BP (!) 169/72 (BP Location: Right arm, Patient Position: Sitting)   Pulse (!) 58   Ht 5' 3" (1.6 m)   Wt 75.8 kg (167 lb)   LMP  (Exact Date)   BMI 29.58 kg/m²     Physical Exam:  Eyes: Pupils are equal, round, and reactive to light.   Neck: Supple. No mass  Cardiovascular: Regular rhythm . No murmur   Pulmonary/Chest: Lungs clear   Abdominal: Soft. No mass palpated. Nontender, no guarding. Positive bowel sounds   Musculoskeletal: No deformity. No edema.   Psychiatric: Alert and oriented    Assessment:     1. Normochromic normocytic anemia    2. History of peptic ulcer disease    3. Stage 3 chronic kidney disease    4. Left spastic " hemiparesis    5. PEG (percutaneous endoscopic gastrostomy) status      Plan:     Lucy was seen today for abdominal pain, anemia and constipation.    Diagnoses and all orders for this visit:    Normochromic normocytic anemia  -     Case request GI: ESOPHAGOGASTRODUODENOSCOPY (EGD)    History of peptic ulcer disease  -     Case request GI: ESOPHAGOGASTRODUODENOSCOPY (EGD)    Stage 3 chronic kidney disease    Left spastic hemiparesis    PEG (percutaneous endoscopic gastrostomy) status      Plan:  Continue Pepcid  EGD to rule out recurrent ulcer disease  Continue erythropoietin, iron.

## 2017-12-06 NOTE — ANESTHESIA PREPROCEDURE EVALUATION
12/06/2017  Lucy Perez is a 57 y.o., female.    Anesthesia Evaluation      I have reviewed the Medications.     Review of Systems  Anesthesia Hx:  Denies Family Hx of Anesthesia complications. Personal Hx of Anesthesia complications Slow To Awaken/Delayed Emergence   Social:  Non-Smoker, No Alcohol Use    Cardiovascular:   BLANKENSHIP  Functional Capacity 1 METS    Renal/:  Kidney Function/Disease, Chronic Kidney Disease (CKD) , CKD Stage III (GFR 30-59)    Musculoskeletal:   Arthritis     Neurological:   CVA, residual symptoms Neuromuscular Disease, Headaches Seizures  Intracranial Aneurysm, s/p clipping in past, stable    Endocrine:   Diabetes    Psych:   Psychiatric History      CONCLUSIONS     1 - Normal left ventricular systolic function (EF 65-70%).     2 - Left ventricular diastolic dysfunction.     3 - Biatrial enlargement.     4 - Normal right ventricular systolic function .     5 - Trivial to mild tricuspid regurgitation.     6 - Low central venous pressure.     7 - The estimated PA systolic pressure is 35 mmHg. This document has been electronically    SIGNED BY: Roberto Moran MD On: 10/30/2016 16:28    Physical Exam  General:  Well nourished    Airway/Jaw/Neck:  Airway Findings: Mouth Opening: Normal Tongue: Normal  General Airway Assessment: Adult  Mallampati: II      Dental:  Dental Findings: In tact   Chest/Lungs:  Chest/Lungs Findings: Clear to auscultation, Normal Respiratory Rate     Heart/Vascular:  Heart Findings: Rate: Normal  Rhythm: Regular Rhythm  Heart Murmur  Systolic        Mental Status:  Mental Status Findings:  Cooperative, Alert and Oriented         Anesthesia Plan  Type of Anesthesia, risks & benefits discussed:  Anesthesia Type:  MAC  Patient's Preference: MAC  Intra-op Monitoring Plan:   Intra-op Monitoring Plan Comments:   Post Op Pain Control Plan:   Post Op Pain  Control Plan Comments:   Induction:   IV  Beta Blocker:         Informed Consent: Patient understands risks and agrees with Anesthesia plan.  Questions answered. Anesthesia consent signed with patient.  ASA Score: 2     Day of Surgery Review of History & Physical:            Ready For Surgery From Anesthesia Perspective.

## 2017-12-06 NOTE — DISCHARGE INSTRUCTIONS
Post EGD Discharge Instruction    Lucy Mathewjames  12/6/2017  Dallas Lock Jr., *    RESTRICTIONS ON ACTIVITY:    -DO NOT drive a car, operate machinery or make critical decisions, or do activities that require coordination or balance for 24 hours.  -Following Day: Return to full activities including work.  -Diet: Eat and drink normally unless instructed otherwise.    TREATMENT FOR COMMON SIDE EFFECTS:  *Sore Throat - treat with throat lozenges, gargle with warm salt water.  *Mild abdominal pain & bloating- rest and take liquids only.    SYMPTOMS TO WATCH FOR AND REPORT TO YOUR PHYSICIAN:  1. Chills or fever occurring 24 hours after procedure.  2. Pain in chest.  3. SEVERE abdominal pain or bloating.  4. Rectal bleeding which could be maroon or black.    If you have any questions or problems, please call your Physician:    Dallas Lock Jr., *     If a complication or emergency situation arises and you are unable to reach your Physician - GO TO THE EMERGENCY ROOM.

## 2017-12-06 NOTE — ANESTHESIA POSTPROCEDURE EVALUATION
"Anesthesia Post Evaluation    Patient: Lucy Perez    Procedure(s) Performed: Procedure(s) (LRB):  ESOPHAGOGASTRODUODENOSCOPY (EGD) (N/A)    Final Anesthesia Type: MAC  Patient location during evaluation: GI PACU  Patient participation: Yes- Able to Participate  Level of consciousness: awake and alert and oriented  Post-procedure vital signs: reviewed and stable  Pain management: adequate  Airway patency: patent  PONV status at discharge: No PONV  Anesthetic complications: no      Cardiovascular status: blood pressure returned to baseline, hemodynamically stable and stable  Respiratory status: unassisted, spontaneous ventilation and room air  Hydration status: euvolemic  Follow-up not needed.        Visit Vitals  /68 (BP Location: Right arm, Patient Position: Lying)   Pulse 60   Temp 36.8 °C (98.3 °F) (Oral)   Resp 18   Ht 5' 3" (1.6 m)   Wt 72.6 kg (160 lb)   LMP  (Exact Date)   SpO2 (!) 92%   Breastfeeding? No   BMI 28.34 kg/m²       Pain/Samia Score: Pain Assessment Performed: Yes (12/6/2017  8:32 AM)  Presence of Pain: complains of pain/discomfort (12/6/2017  8:32 AM)      "

## 2017-12-11 ENCOUNTER — TELEPHONE (OUTPATIENT)
Dept: ENDOSCOPY | Facility: HOSPITAL | Age: 57
End: 2017-12-11

## 2017-12-20 ENCOUNTER — OFFICE VISIT (OUTPATIENT)
Dept: PHYSICAL MEDICINE AND REHAB | Facility: CLINIC | Age: 57
End: 2017-12-20
Payer: MEDICARE

## 2017-12-20 ENCOUNTER — LAB VISIT (OUTPATIENT)
Dept: LAB | Facility: HOSPITAL | Age: 57
End: 2017-12-20
Attending: INTERNAL MEDICINE
Payer: MEDICARE

## 2017-12-20 ENCOUNTER — TELEPHONE (OUTPATIENT)
Dept: GASTROENTEROLOGY | Facility: CLINIC | Age: 57
End: 2017-12-20

## 2017-12-20 VITALS
WEIGHT: 160 LBS | SYSTOLIC BLOOD PRESSURE: 126 MMHG | BODY MASS INDEX: 28.35 KG/M2 | HEART RATE: 60 BPM | DIASTOLIC BLOOD PRESSURE: 61 MMHG | HEIGHT: 63 IN

## 2017-12-20 DIAGNOSIS — E46 PROTEIN-CALORIE MALNUTRITION: ICD-10-CM

## 2017-12-20 DIAGNOSIS — D64.9 ANEMIA, UNSPECIFIED TYPE: ICD-10-CM

## 2017-12-20 DIAGNOSIS — R25.2 SPASTICITY: Primary | ICD-10-CM

## 2017-12-20 DIAGNOSIS — G81.14 LEFT SPASTIC HEMIPARESIS: ICD-10-CM

## 2017-12-20 LAB
BASOPHILS # BLD AUTO: 0.05 K/UL
BASOPHILS NFR BLD: 1.1 %
DIFFERENTIAL METHOD: ABNORMAL
EOSINOPHIL # BLD AUTO: 0.2 K/UL
EOSINOPHIL NFR BLD: 3.8 %
ERYTHROCYTE [DISTWIDTH] IN BLOOD BY AUTOMATED COUNT: 14.8 %
FOLATE SERPL-MCNC: 35 NG/ML
HCT VFR BLD AUTO: 27 %
HGB BLD-MCNC: 8.4 G/DL
LYMPHOCYTES # BLD AUTO: 0.7 K/UL
LYMPHOCYTES NFR BLD: 15.4 %
MCH RBC QN AUTO: 29.6 PG
MCHC RBC AUTO-ENTMCNC: 31.1 G/DL
MCV RBC AUTO: 95 FL
MONOCYTES # BLD AUTO: 0.3 K/UL
MONOCYTES NFR BLD: 5.9 %
NEUTROPHILS # BLD AUTO: 3.3 K/UL
NEUTROPHILS NFR BLD: 73.6 %
NRBC BLD-RTO: 0 /100 WBC
PLATELET # BLD AUTO: 212 K/UL
PMV BLD AUTO: 10.6 FL
RBC # BLD AUTO: 2.84 M/UL
RETICS/RBC NFR AUTO: 1.7 %
VIT B12 SERPL-MCNC: 481 PG/ML
WBC # BLD AUTO: 4.42 K/UL

## 2017-12-20 PROCEDURE — 64644 CHEMODENERV 1 EXTREM 5/> MUS: CPT | Mod: S$GLB,,, | Performed by: PHYSICAL MEDICINE & REHABILITATION

## 2017-12-20 PROCEDURE — 85025 COMPLETE CBC W/AUTO DIFF WBC: CPT

## 2017-12-20 PROCEDURE — 82746 ASSAY OF FOLIC ACID SERUM: CPT

## 2017-12-20 PROCEDURE — 99499 UNLISTED E&M SERVICE: CPT | Mod: S$GLB,,, | Performed by: PHYSICAL MEDICINE & REHABILITATION

## 2017-12-20 PROCEDURE — 95874 GUIDE NERV DESTR NEEDLE EMG: CPT | Mod: S$GLB,,, | Performed by: PHYSICAL MEDICINE & REHABILITATION

## 2017-12-20 PROCEDURE — 99213 OFFICE O/P EST LOW 20 MIN: CPT | Mod: 25,S$GLB,, | Performed by: PHYSICAL MEDICINE & REHABILITATION

## 2017-12-20 PROCEDURE — 82607 VITAMIN B-12: CPT

## 2017-12-20 PROCEDURE — 85045 AUTOMATED RETICULOCYTE COUNT: CPT

## 2017-12-20 PROCEDURE — 36415 COLL VENOUS BLD VENIPUNCTURE: CPT | Mod: PO

## 2017-12-20 PROCEDURE — 99999 PR PBB SHADOW E&M-EST. PATIENT-LVL III: CPT | Mod: PBBFAC,,, | Performed by: PHYSICAL MEDICINE & REHABILITATION

## 2017-12-20 NOTE — TELEPHONE ENCOUNTER
----- Message from Dallas Lock Jr., MD sent at 12/19/2017  4:54 PM CST -----  Gastric biopsies negative for H. pylori

## 2017-12-21 ENCOUNTER — PATIENT MESSAGE (OUTPATIENT)
Dept: GASTROENTEROLOGY | Facility: CLINIC | Age: 57
End: 2017-12-21

## 2017-12-22 ENCOUNTER — TELEPHONE (OUTPATIENT)
Dept: GASTROENTEROLOGY | Facility: CLINIC | Age: 57
End: 2017-12-22

## 2017-12-26 NOTE — PROGRESS NOTES
"Subjective:       Patient ID: Lucy Perez is a 57 y.o. female.    Chief Complaint: No chief complaint on file.    This very pleasant 58yo BF is followed for:    -- central pain syndrome.  She had a R SAH requiring emergent hemicraniex on 8/13/16.  She received a PEG but has since been placed on a diet.  [place] [facility] [place] a SNF for only 18 days where she did not progress much.   There are no records available from her hospitalization.  She is c/o left side pain as "stinging" and "electric shocks".  This occurs without movement but especially with PROM of the arm/leg.  Another MD increased gabapentin from 100mg TID to 300mg TID but this made her too sleepy and was changed to 300mg Q HS only.  I started carbamazepine 200mg BID at the last visit on 10/18/16 but her daughter reports today that it was no help and was stopped.      -- spasticity involving primarily the left hand.  It is difficult to extend the fingers of the left hand due to pain.  It is not clear if this is pain of spasticity or due to the central pain syndrome.  I started dantrolene 25mg TID and increased to 50mg TID but she was hospitalized with HTN which was attributed to dantrolene (?) and it was stopped before she could take the 50mg dose.  She recently had HH PT/OT restarted   She is able to answer my questions mostly appropriately but does appear to have some cognitive deficits.  Her family is very involved in her care.      --CC complaint today of "left arm/leg spasticity". Here today for Botox injections to LUE.                Review of Systems   Constitutional: Negative for fatigue.   Eyes: Negative for visual disturbance.   Respiratory: Negative for shortness of breath.    Cardiovascular: Negative for chest pain and leg swelling.   Gastrointestinal: Negative for constipation.   Genitourinary: Negative for difficulty urinating, dysuria, frequency and urgency.   Musculoskeletal: Positive for gait problem. Negative for " arthralgias, back pain, joint swelling, myalgias, neck pain and neck stiffness.   Neurological: Positive for speech difficulty, weakness and numbness. Negative for tremors.   Psychiatric/Behavioral: Negative for dysphoric mood and sleep disturbance. The patient is not nervous/anxious.        Objective:      Physical Exam   Constitutional: She is oriented to person, place, and time. She appears well-nourished.   Eyes: EOM are normal. Pupils are equal, round, and reactive to light.   Neck: Normal range of motion. Neck supple.   Cardiovascular: Normal rate.    Pulmonary/Chest: Effort normal.   Musculoskeletal:   RUE/RLE AROM WNL  LUE/LLE with no AROM   Neurological: She is oriented to person, place, and time.   RUE/RLE 5/5  LUE 0/5  LLE 1/5 HF, HE, 0/5 KF/KE/DF/PF   Skin: No rash noted.   Psychiatric: She has a normal mood and affect.       Assessment:       1. Left spastic hemiparesis -- Continue dantrolene to 50mg TID. She has a resting hand splint which she cannot wear due to the tone.  I explained that the objective is to relieve the spasticity enough that the pt can don the splint for extended periods which would likely help with her tone.      Plan:     Therapy: will order OT for patient to work with RUE      PROCEDURE NOTE:   The potential risks were discussed with the patient and He consented to proceed. After identifying the correct side (left upper) the patient was placed in a supine position. A 5cc syringe was used with 200u Botox reconstituted into 4cc N.S. (50u/cc). A 27G EMG injection needle was utilized as well as EMG guidance to inject the following muscles:   PT: 25 units --E-stim  FCR: 25 units --E-stim  FDS: 25 units --E-stim  FDP: 25 units -- E-stim  Biceps: 100 units (2 sites)-- EMG     Lot No: [id number]  Exp Date: 07/2020     EMG and stimulation guidance were used throughout. The patient tolerated the procedure well.         Follow up: Will schedule for 2 month follow up to monitor progression of  therapy and Botox         Plan:       RTC in 2 months to evaluate Botox response and progress with OT.

## 2018-01-02 ENCOUNTER — TELEPHONE (OUTPATIENT)
Dept: INFUSION THERAPY | Facility: HOSPITAL | Age: 58
End: 2018-01-02

## 2018-01-03 ENCOUNTER — OFFICE VISIT (OUTPATIENT)
Dept: INTERNAL MEDICINE | Facility: CLINIC | Age: 58
End: 2018-01-03
Payer: MEDICARE

## 2018-01-03 ENCOUNTER — TELEPHONE (OUTPATIENT)
Dept: INTERNAL MEDICINE | Facility: CLINIC | Age: 58
End: 2018-01-03

## 2018-01-03 ENCOUNTER — LAB VISIT (OUTPATIENT)
Dept: LAB | Facility: HOSPITAL | Age: 58
End: 2018-01-03
Attending: INTERNAL MEDICINE
Payer: MEDICARE

## 2018-01-03 VITALS
HEART RATE: 61 BPM | DIASTOLIC BLOOD PRESSURE: 74 MMHG | SYSTOLIC BLOOD PRESSURE: 151 MMHG | HEIGHT: 63 IN | OXYGEN SATURATION: 97 %

## 2018-01-03 DIAGNOSIS — N18.30 TYPE 2 DIABETES MELLITUS WITH STAGE 3 CHRONIC KIDNEY DISEASE, WITH LONG-TERM CURRENT USE OF INSULIN: ICD-10-CM

## 2018-01-03 DIAGNOSIS — I61.9 HEMORRHAGIC CEREBROVASCULAR ACCIDENT (CVA): ICD-10-CM

## 2018-01-03 DIAGNOSIS — I15.0 RENOVASCULAR HYPERTENSION: Chronic | ICD-10-CM

## 2018-01-03 DIAGNOSIS — N18.30 ANEMIA IN STAGE 3 CHRONIC KIDNEY DISEASE: ICD-10-CM

## 2018-01-03 DIAGNOSIS — E11.22 TYPE 2 DIABETES MELLITUS WITH STAGE 3 CHRONIC KIDNEY DISEASE, WITHOUT LONG-TERM CURRENT USE OF INSULIN: Chronic | ICD-10-CM

## 2018-01-03 DIAGNOSIS — K59.00 CONSTIPATION, UNSPECIFIED CONSTIPATION TYPE: ICD-10-CM

## 2018-01-03 DIAGNOSIS — E11.22 TYPE 2 DIABETES MELLITUS WITH STAGE 3 CHRONIC KIDNEY DISEASE, WITH LONG-TERM CURRENT USE OF INSULIN: ICD-10-CM

## 2018-01-03 DIAGNOSIS — D63.1 ANEMIA IN STAGE 3 CHRONIC KIDNEY DISEASE: ICD-10-CM

## 2018-01-03 DIAGNOSIS — N18.30 TYPE 2 DIABETES MELLITUS WITH STAGE 3 CHRONIC KIDNEY DISEASE, WITHOUT LONG-TERM CURRENT USE OF INSULIN: Chronic | ICD-10-CM

## 2018-01-03 DIAGNOSIS — Z79.4 TYPE 2 DIABETES MELLITUS WITH STAGE 3 CHRONIC KIDNEY DISEASE, WITH LONG-TERM CURRENT USE OF INSULIN: ICD-10-CM

## 2018-01-03 DIAGNOSIS — F41.9 ANXIETY: ICD-10-CM

## 2018-01-03 DIAGNOSIS — M62.838 MUSCLE SPASTICITY: Primary | ICD-10-CM

## 2018-01-03 LAB
ALBUMIN SERPL BCP-MCNC: 3.9 G/DL
ALP SERPL-CCNC: 82 U/L
ALT SERPL W/O P-5'-P-CCNC: 6 U/L
ANION GAP SERPL CALC-SCNC: 13 MMOL/L
ANION GAP SERPL CALC-SCNC: 13 MMOL/L
AST SERPL-CCNC: 10 U/L
BASOPHILS # BLD AUTO: 0.03 K/UL
BASOPHILS NFR BLD: 0.7 %
BILIRUB SERPL-MCNC: 0.5 MG/DL
BUN SERPL-MCNC: 34 MG/DL
BUN SERPL-MCNC: 34 MG/DL
CALCIUM SERPL-MCNC: 9.7 MG/DL
CALCIUM SERPL-MCNC: 9.7 MG/DL
CHLORIDE SERPL-SCNC: 115 MMOL/L
CHLORIDE SERPL-SCNC: 115 MMOL/L
CO2 SERPL-SCNC: 19 MMOL/L
CO2 SERPL-SCNC: 19 MMOL/L
CREAT SERPL-MCNC: 1.9 MG/DL
CREAT SERPL-MCNC: 1.9 MG/DL
DIFFERENTIAL METHOD: ABNORMAL
EOSINOPHIL # BLD AUTO: 0.2 K/UL
EOSINOPHIL NFR BLD: 3.6 %
ERYTHROCYTE [DISTWIDTH] IN BLOOD BY AUTOMATED COUNT: 15.4 %
EST. GFR  (AFRICAN AMERICAN): 32.8 ML/MIN/1.73 M^2
EST. GFR  (AFRICAN AMERICAN): 32.8 ML/MIN/1.73 M^2
EST. GFR  (NON AFRICAN AMERICAN): 28.4 ML/MIN/1.73 M^2
EST. GFR  (NON AFRICAN AMERICAN): 28.4 ML/MIN/1.73 M^2
ESTIMATED AVG GLUCOSE: 88 MG/DL
GLUCOSE SERPL-MCNC: 131 MG/DL
GLUCOSE SERPL-MCNC: 131 MG/DL
HBA1C MFR BLD HPLC: 4.7 %
HCT VFR BLD AUTO: 29 %
HGB BLD-MCNC: 8.8 G/DL
LYMPHOCYTES # BLD AUTO: 0.7 K/UL
LYMPHOCYTES NFR BLD: 16.6 %
MCH RBC QN AUTO: 29.1 PG
MCHC RBC AUTO-ENTMCNC: 30.3 G/DL
MCV RBC AUTO: 96 FL
MONOCYTES # BLD AUTO: 0.2 K/UL
MONOCYTES NFR BLD: 5.3 %
NEUTROPHILS # BLD AUTO: 3.1 K/UL
NEUTROPHILS NFR BLD: 73.8 %
PLATELET # BLD AUTO: 212 K/UL
PMV BLD AUTO: 10.9 FL
POTASSIUM SERPL-SCNC: 4.4 MMOL/L
POTASSIUM SERPL-SCNC: 4.4 MMOL/L
PROT SERPL-MCNC: 7.5 G/DL
RBC # BLD AUTO: 3.02 M/UL
SODIUM SERPL-SCNC: 147 MMOL/L
SODIUM SERPL-SCNC: 147 MMOL/L
WBC # BLD AUTO: 4.15 K/UL

## 2018-01-03 PROCEDURE — 85025 COMPLETE CBC W/AUTO DIFF WBC: CPT | Mod: PO

## 2018-01-03 PROCEDURE — 99999 PR PBB SHADOW E&M-EST. PATIENT-LVL V: CPT | Mod: PBBFAC,,, | Performed by: INTERNAL MEDICINE

## 2018-01-03 PROCEDURE — 36415 COLL VENOUS BLD VENIPUNCTURE: CPT | Mod: PO

## 2018-01-03 PROCEDURE — 80053 COMPREHEN METABOLIC PANEL: CPT

## 2018-01-03 PROCEDURE — 83036 HEMOGLOBIN GLYCOSYLATED A1C: CPT

## 2018-01-03 PROCEDURE — 99214 OFFICE O/P EST MOD 30 MIN: CPT | Mod: S$GLB,,, | Performed by: INTERNAL MEDICINE

## 2018-01-03 PROCEDURE — 99499 UNLISTED E&M SERVICE: CPT | Mod: S$GLB,,, | Performed by: INTERNAL MEDICINE

## 2018-01-03 RX ORDER — POLYETHYLENE GLYCOL 3350 17 G/17G
17 POWDER, FOR SOLUTION ORAL DAILY
Qty: 90 PACKET | Refills: 0
Start: 2018-01-03 | End: 2018-01-06

## 2018-01-03 RX ORDER — ACETAMINOPHEN AND CODEINE PHOSPHATE 300; 60 MG/1; MG/1
TABLET ORAL
Qty: 45 TABLET | Refills: 0 | Status: SHIPPED | OUTPATIENT
Start: 2018-01-03 | End: 2018-02-21

## 2018-01-03 RX ORDER — ALPRAZOLAM 0.5 MG/1
TABLET ORAL
Qty: 30 TABLET | Refills: 0 | Status: SHIPPED | OUTPATIENT
Start: 2018-01-03 | End: 2018-02-21

## 2018-01-03 RX ORDER — DANTROLENE SODIUM 25 MG/1
CAPSULE ORAL
Qty: 180 CAPSULE | Refills: 0
Start: 2018-01-03 | End: 2018-05-03 | Stop reason: SDUPTHER

## 2018-01-03 RX ORDER — PREGABALIN 25 MG/1
CAPSULE ORAL
Qty: 90 CAPSULE | Refills: 6 | Status: SHIPPED | OUTPATIENT
Start: 2018-01-03 | End: 2018-07-25 | Stop reason: SDUPTHER

## 2018-01-03 NOTE — TELEPHONE ENCOUNTER
Aura, please call Heme-Onc/Infusion to R/S Mrs Ayala's Iron Infusion that she missed yesterday if you can.  Thanks so much, Dr Muniz

## 2018-01-03 NOTE — PROGRESS NOTES
REASON FOR VISIT:  Ms. Perez is a very sweet 57-year-old female, known to   myself, who presents to clinic today for a scheduled followup appointment.    CHIEF COMPLAINT:  Followup diabetes mellitus, hypertension, anemia and post CVA   status.    HISTORY OF PRESENT ILLNESS:  Ms. Perez presents with her  for   followup of the above.  She said her daughter Reina had to work, so she is not   here today, although we talked with Reina by phone via speaker phone.  She   notes she was to have received a transfusion on 12/04/2017, but repeat blood   count that day showed improvement in her hematocrit from 22 to 28 and the   transfusion was canceled.  She notes that her home blood pressures have been   doing okay.  She has had some problems with constipation, which seems to be from   the iron tablets.  She did miss her iron infusion yesterday, would like to have   this rescheduled.  She notes that she saw Dr. Pardo who increased her   Dantrolene to 75 mg 3 times daily, which does not seem to have helped much with   her post stroke spasticity and pain.  Daughter, Reina, notes that the only   thing that seems to have helped was the Lyrica, but this was so strong that it   knocked her out.  She was given a 75 mg Lyrica prescription by Dr. Galloway.  She   notes that she would like to get up and around and try to ambulate with her   walker.  She is also hoping that eventually her PEG tube can be removed.  She is   eating all foods by mouth, is only using the PEG tube for her medications and   she has even started swallowing some of her medications, so she seems to be   doing better with this.  She does note that the pain in the left arm is the most   severe.    PAST MEDICAL, SURGICAL AND SOCIAL HISTORY:  Please see as thoroughly stated in   EPIC chart, which has been reviewed.    PHYSICAL EXAMINATION:  VITAL SIGNS:  Blood pressure initially per nurse check is 151/74 with pulse 61,   recheck blood pressure  is 139/86 sitting and of the left side.  GENERAL:  Lucy is seated in a reclining wheelchair, but is sitting up, actually   appears more talkative and energetic than normal.  She does continue with   weakness of the left arm and leg due to her hemiparesis.  HEART:  Regular rate and rhythm.  No arrhythmias.  Ventricular heart rate is at   72.  LUNGS:  Clear.  ABDOMEN:  Soft and nontender without masses.  Left-sided abdomen shows PEG tube   in place without drainage or any abnormalities.  No tenderness at the PEG tube   site.  EXTREMITIES:  Negative for edema.  Bilateral feet are examined with acceptable   DP pulses.  No calluses or diabetic ulcers or other abnormalities.    WORKUP:  Last hemoglobin A1c done on November 27th, and excellent at 4.8.  Last   chemistry prior to that was December 4th.    ASSESSMENT AND PLAN:  1.  Diabetes mellitus type 2, insulin-dependent and under excellent control with   history of cerebrovascular disease.  A.  Continue with Levemir 14 units in the a.m.  B.  Repeat lab work with return to clinic by 4 months.  2.  Essential hypertension with blood pressure acceptable on present therapy.  A.  Continue with Norvasc 10 mg 1 a day, hydralazine 100 mg 3 times daily,   hydrochlorothiazide 25 mg 1 a day and labetalol 500 mg t.i.d.  3.  Spasticity pain status post hemorrhagic CVA with left-sided hemiparesis.  A.  Recommend a trial of low-dose Lyrica to start at 25 mg 1 tablet in the a.m.   and 50 mg or 2 tablets at bedtime.  B.  For severe pain, I have given a prescription for Tylenol with Codeine No. 4,   1 every 12 hours as needed, #45 tablets only with family to monitor this   closely and only to be taken if family there.  C.  Continue to follow along as per Physical Medicine and Rehabilitation   physician, Dr. Pardo.  4.  Constipation.  A.  We will add MiraLax 1 capful daily with 4 to 6 ounces of liquid and I have   recommended the patient continue with her over-the-counter daily stool  softener.  5.  Anemia, felt to be secondary to chronic renal insufficiency, actually   improved with iron infusions as per Dr. Owen in Hematology/Oncology along with   oral iron tab daily and we will see how the patient does.  A.  Check CBC with today's lab work to ensure continued improvement.  6.  Health maintenance.  A.  Phone review lab work from today to include hemoglobin A1c, BMP and CBC.  B.  Return to clinic by 4 months with fasting lab.  C.  Physical therapy/occupational therapy to be initiated per Dr. Pardo per   patient report and we will follow along.  D.  For now, continue with PEG tube, hesitate to remove until sure the patient   is completely tolerating p.o. medications and food without difficulty.      FMS/IN  dd: 01/03/2018 17:41:03 (CST)  td: 01/04/2018 13:04:46 (CST)  Doc ID   #[id number]  Job ID #[id number]    CC:     This office note has been dictated.

## 2018-01-05 ENCOUNTER — TELEPHONE (OUTPATIENT)
Dept: INTERNAL MEDICINE | Facility: CLINIC | Age: 58
End: 2018-01-05

## 2018-01-05 NOTE — TELEPHONE ENCOUNTER
Aura, please call Heme-Onc/Infusion to R/S Mrs Ayala's Iron Infusion that she missed yesterday if you can.  Thanks so much, Dr Muniz    Called patient no answer L/M with patient daughter for a call back.

## 2018-01-08 PROBLEM — E11.22 CONTROLLED TYPE 2 DIABETES MELLITUS WITH STAGE 3 CHRONIC KIDNEY DISEASE, WITH LONG-TERM CURRENT USE OF INSULIN: Status: ACTIVE | Noted: 2017-08-02

## 2018-01-08 PROBLEM — N18.30 CONTROLLED TYPE 2 DIABETES MELLITUS WITH STAGE 3 CHRONIC KIDNEY DISEASE, WITH LONG-TERM CURRENT USE OF INSULIN: Status: ACTIVE | Noted: 2017-08-02

## 2018-01-08 PROBLEM — E11.39 TYPE 2 DIABETES MELLITUS WITH OPHTHALMIC COMPLICATION, WITHOUT LONG-TERM CURRENT USE OF INSULIN: Status: RESOLVED | Noted: 2017-08-02 | Resolved: 2018-01-08

## 2018-01-08 PROBLEM — Z79.4 CONTROLLED TYPE 2 DIABETES MELLITUS WITH STAGE 3 CHRONIC KIDNEY DISEASE, WITH LONG-TERM CURRENT USE OF INSULIN: Status: ACTIVE | Noted: 2017-08-02

## 2018-01-11 ENCOUNTER — TELEPHONE (OUTPATIENT)
Dept: INTERNAL MEDICINE | Facility: CLINIC | Age: 58
End: 2018-01-11

## 2018-01-12 ENCOUNTER — PES CALL (OUTPATIENT)
Dept: ADMINISTRATIVE | Facility: CLINIC | Age: 58
End: 2018-01-12

## 2018-01-26 DIAGNOSIS — M62.838 MUSCLE SPASTICITY: ICD-10-CM

## 2018-01-26 RX ORDER — DANTROLENE SODIUM 25 MG/1
CAPSULE ORAL
Qty: 180 CAPSULE | Refills: 0
Start: 2018-01-26

## 2018-01-27 NOTE — TELEPHONE ENCOUNTER
Cecy, please call Mrs Perez and let her know she needs to get the Dantrium refilled by Dr LING Pardo, her PM&R Physician.  Thanks

## 2018-02-01 ENCOUNTER — INFUSION (OUTPATIENT)
Dept: INFUSION THERAPY | Facility: HOSPITAL | Age: 58
End: 2018-02-01
Attending: INTERNAL MEDICINE
Payer: MEDICARE

## 2018-02-01 ENCOUNTER — LAB VISIT (OUTPATIENT)
Dept: LAB | Facility: HOSPITAL | Age: 58
End: 2018-02-01
Attending: INTERNAL MEDICINE
Payer: MEDICARE

## 2018-02-01 ENCOUNTER — OFFICE VISIT (OUTPATIENT)
Dept: HEMATOLOGY/ONCOLOGY | Facility: CLINIC | Age: 58
End: 2018-02-01
Payer: MEDICARE

## 2018-02-01 VITALS — RESPIRATION RATE: 18 BRPM | SYSTOLIC BLOOD PRESSURE: 156 MMHG | DIASTOLIC BLOOD PRESSURE: 70 MMHG | HEART RATE: 55 BPM

## 2018-02-01 VITALS
TEMPERATURE: 98 F | RESPIRATION RATE: 20 BRPM | DIASTOLIC BLOOD PRESSURE: 69 MMHG | OXYGEN SATURATION: 96 % | HEIGHT: 63 IN | SYSTOLIC BLOOD PRESSURE: 149 MMHG | HEART RATE: 56 BPM

## 2018-02-01 VITALS
HEART RATE: 58 BPM | SYSTOLIC BLOOD PRESSURE: 143 MMHG | TEMPERATURE: 98 F | DIASTOLIC BLOOD PRESSURE: 60 MMHG | RESPIRATION RATE: 18 BRPM

## 2018-02-01 DIAGNOSIS — G40.209 PARTIAL SYMPTOMATIC EPILEPSY WITH COMPLEX PARTIAL SEIZURES, NOT INTRACTABLE, WITHOUT STATUS EPILEPTICUS: ICD-10-CM

## 2018-02-01 DIAGNOSIS — D64.9 SYMPTOMATIC ANEMIA: ICD-10-CM

## 2018-02-01 DIAGNOSIS — G81.14 LEFT SPASTIC HEMIPARESIS: ICD-10-CM

## 2018-02-01 DIAGNOSIS — N18.30 ANEMIA IN STAGE 3 CHRONIC KIDNEY DISEASE: Primary | ICD-10-CM

## 2018-02-01 DIAGNOSIS — Z93.1 PEG (PERCUTANEOUS ENDOSCOPIC GASTROSTOMY) STATUS: Chronic | ICD-10-CM

## 2018-02-01 DIAGNOSIS — N18.30 ANEMIA IN STAGE 3 CHRONIC KIDNEY DISEASE: ICD-10-CM

## 2018-02-01 DIAGNOSIS — D63.1 ANEMIA IN STAGE 3 CHRONIC KIDNEY DISEASE: ICD-10-CM

## 2018-02-01 DIAGNOSIS — D63.1 ANEMIA IN STAGE 3 CHRONIC KIDNEY DISEASE: Primary | ICD-10-CM

## 2018-02-01 DIAGNOSIS — D64.9 SYMPTOMATIC ANEMIA: Primary | ICD-10-CM

## 2018-02-01 LAB
ABO + RH BLD: NORMAL
ANION GAP SERPL CALC-SCNC: 8 MMOL/L
BLD GP AB SCN CELLS X3 SERPL QL: NORMAL
BLD PROD TYP BPU: NORMAL
BLOOD UNIT EXPIRATION DATE: NORMAL
BLOOD UNIT TYPE CODE: 5100
BLOOD UNIT TYPE: NORMAL
BUN SERPL-MCNC: 47 MG/DL
CALCIUM SERPL-MCNC: 9.2 MG/DL
CHLORIDE SERPL-SCNC: 114 MMOL/L
CO2 SERPL-SCNC: 19 MMOL/L
CODING SYSTEM: NORMAL
CREAT SERPL-MCNC: 2 MG/DL
DISPENSE STATUS: NORMAL
ERYTHROCYTE [DISTWIDTH] IN BLOOD BY AUTOMATED COUNT: 15.6 %
EST. GFR  (AFRICAN AMERICAN): 30.8 ML/MIN/1.73 M^2
EST. GFR  (NON AFRICAN AMERICAN): 26.7 ML/MIN/1.73 M^2
GLUCOSE SERPL-MCNC: 156 MG/DL
HCT VFR BLD AUTO: 23.4 %
HGB BLD-MCNC: 7.1 G/DL
MCH RBC QN AUTO: 29.1 PG
MCHC RBC AUTO-ENTMCNC: 30.3 G/DL
MCV RBC AUTO: 96 FL
PLATELET # BLD AUTO: 174 K/UL
PMV BLD AUTO: 10.2 FL
POTASSIUM SERPL-SCNC: 5 MMOL/L
RBC # BLD AUTO: 2.44 M/UL
RETICS/RBC NFR AUTO: 1.4 %
SODIUM SERPL-SCNC: 141 MMOL/L
TRANS ERYTHROCYTES VOL PATIENT: NORMAL ML
WBC # BLD AUTO: 3.98 K/UL

## 2018-02-01 PROCEDURE — P9021 RED BLOOD CELLS UNIT: HCPCS

## 2018-02-01 PROCEDURE — 85027 COMPLETE CBC AUTOMATED: CPT

## 2018-02-01 PROCEDURE — 63600175 PHARM REV CODE 636 W HCPCS: Mod: JG | Performed by: INTERNAL MEDICINE

## 2018-02-01 PROCEDURE — 3008F BODY MASS INDEX DOCD: CPT | Mod: S$GLB,,, | Performed by: INTERNAL MEDICINE

## 2018-02-01 PROCEDURE — 99999 PR PBB SHADOW E&M-EST. PATIENT-LVL III: CPT | Mod: PBBFAC,,, | Performed by: INTERNAL MEDICINE

## 2018-02-01 PROCEDURE — 86901 BLOOD TYPING SEROLOGIC RH(D): CPT

## 2018-02-01 PROCEDURE — 25000003 PHARM REV CODE 250: Performed by: INTERNAL MEDICINE

## 2018-02-01 PROCEDURE — 96372 THER/PROPH/DIAG INJ SC/IM: CPT

## 2018-02-01 PROCEDURE — 86920 COMPATIBILITY TEST SPIN: CPT

## 2018-02-01 PROCEDURE — 99499 UNLISTED E&M SERVICE: CPT | Mod: S$GLB,,, | Performed by: INTERNAL MEDICINE

## 2018-02-01 PROCEDURE — 36430 TRANSFUSION BLD/BLD COMPNT: CPT

## 2018-02-01 PROCEDURE — 85045 AUTOMATED RETICULOCYTE COUNT: CPT

## 2018-02-01 PROCEDURE — 80048 BASIC METABOLIC PNL TOTAL CA: CPT

## 2018-02-01 PROCEDURE — 99215 OFFICE O/P EST HI 40 MIN: CPT | Mod: S$GLB,,, | Performed by: INTERNAL MEDICINE

## 2018-02-01 RX ORDER — DIPHENHYDRAMINE HCL 25 MG
25 CAPSULE ORAL
Status: COMPLETED | OUTPATIENT
Start: 2018-02-01 | End: 2018-02-01

## 2018-02-01 RX ORDER — ACETAMINOPHEN 325 MG/1
650 TABLET ORAL
Status: CANCELLED | OUTPATIENT
Start: 2018-02-01

## 2018-02-01 RX ORDER — HYDROCODONE BITARTRATE AND ACETAMINOPHEN 500; 5 MG/1; MG/1
TABLET ORAL ONCE
Status: CANCELLED | OUTPATIENT
Start: 2018-02-01 | End: 2018-02-01

## 2018-02-01 RX ORDER — DIPHENHYDRAMINE HCL 25 MG
25 CAPSULE ORAL
Status: CANCELLED | OUTPATIENT
Start: 2018-02-01

## 2018-02-01 RX ORDER — ACETAMINOPHEN 325 MG/1
650 TABLET ORAL
Status: COMPLETED | OUTPATIENT
Start: 2018-02-01 | End: 2018-02-01

## 2018-02-01 RX ORDER — HYDROCODONE BITARTRATE AND ACETAMINOPHEN 500; 5 MG/1; MG/1
TABLET ORAL ONCE
Status: COMPLETED | OUTPATIENT
Start: 2018-02-01 | End: 2018-02-01

## 2018-02-01 RX ADMIN — ACETAMINOPHEN 650 MG: 325 TABLET ORAL at 01:02

## 2018-02-01 RX ADMIN — SODIUM CHLORIDE: 0.9 INJECTION, SOLUTION INTRAVENOUS at 01:02

## 2018-02-01 RX ADMIN — DARBEPOETIN ALFA 40 MCG: 40 SOLUTION INTRAVENOUS; SUBCUTANEOUS at 01:02

## 2018-02-01 RX ADMIN — DIPHENHYDRAMINE HYDROCHLORIDE 25 MG: 25 CAPSULE ORAL at 01:02

## 2018-02-01 NOTE — PLAN OF CARE
Problem: Patient Care Overview  Goal: Plan of Care Review  Outcome: Ongoing (interventions implemented as appropriate)  Pt tolerated 1 unit PRBC well with no s/s of reaction.  VSS. NAD.

## 2018-02-01 NOTE — Clinical Note
Follow-up with me in 4 weeks with labs (CBC, CMP, type and screen, retic, erythropoietin, Iron/TIBC, ferritin) before visit.

## 2018-02-02 ENCOUNTER — NURSE TRIAGE (OUTPATIENT)
Dept: ADMINISTRATIVE | Facility: CLINIC | Age: 58
End: 2018-02-02

## 2018-02-02 ENCOUNTER — TELEPHONE (OUTPATIENT)
Dept: INTERNAL MEDICINE | Facility: CLINIC | Age: 58
End: 2018-02-02

## 2018-02-02 NOTE — TELEPHONE ENCOUNTER
"Lucy's daughter, Reina, called triage line to say her mother told her "I feel like I am having a stroke."  She has had stroke in the past with left sided weakness per Reina, and she is now reporting a severe HA that began 10 minutes ago (time of call was 1443).  I can hear her moaning in the background of the phone call, pain is 9/10, and she says "my face doesn't feel right, and my neck is tight."  Instructed Reina to hang up the phone and call 911 immediately for immediate medical attention for her mom.  She states she wants her mom's doctor to call her.  Assured her I will message Beverly Muniz MD , pcp, but that it is very urgent that she call 911 now.  Said she will.    "

## 2018-02-02 NOTE — TELEPHONE ENCOUNTER
"    Reason for Disposition   [1] Numbness of the face, arm or leg on one side of the body AND [2] new onset   Sounds like a life-threatening emergency to the triager    Protocols used: ST HEADACHE-JADE-EMILIANO Ayala's daughter, Reina, called triage line to say her mother told her "I feel like I am having a stroke."  She has had stroke in the past with left sided weakness per Reina, and she is now reporting a severe HA that began 10 minutes ago (time of call was 1443).  I can hear her moaning in the background of the phone call, pain is 9/10, and she says "my face doesn't feel right, and my neck is tight."  Instructed Reina to hang up the phone and call 911 immediately for immediate medical attention for her mom.  She states she wants her mom's doctor to call her.  Assured her I will message Beverly Muniz MD , pcp, but that it is very urgent that she call 911 now.  Said she will.  Message to Beverly Muniz MD , pcp.  Please contact caller directly with any additional care advice.     "

## 2018-02-02 NOTE — TELEPHONE ENCOUNTER
Spoke with Reina who's mom c/o neck and head pain with tightness.  Pt, Mrs Ayala, is worried she may be having a stroke. Given pt's hx of hemorrhagic stroke and concerning H/A, have recommended pt go to ER ASAP. Daughter, Reina agrees and will call ambulance to assist.

## 2018-02-05 ENCOUNTER — TELEPHONE (OUTPATIENT)
Dept: HEMATOLOGY/ONCOLOGY | Facility: CLINIC | Age: 58
End: 2018-02-05

## 2018-02-05 NOTE — PROGRESS NOTES
HEMATOLOGIC MALIGNANCIES PROGRESS NOTE    IDENTIFYING STATEMENT   Lucy Perez (Lucy) is a 57 y.o. female with a  of 1960 from [place] with the diagnosis of anemia.      ONCOLOGY HISTORY:    1. Anemia, likely multifactorial              A.  No normal hemoglobin on record. First valuve is from 13 (10.7 g/dl).               B. 17: Hospital follow-up CBC - hgb 7.3. WBC and plt normal. MCV 96.              C. 17: Hgb 7.2. Plt 149. Retic 1.7%. Iron studies normal. Folic acid low (3.4). B12 normal.    D. 10/9/17: Hgb 8.3. Folate 13.1.    E. 17: Hgb 7.5 g/dl.    F. 2017: Hgb 8.1 g/dl.    G. 1/3/2018: Hgb 8.8   H. 2018: Hgb 7.1     2. History of R-MCA stroke with R putaminal hemorrhagic transformation and residual L-sided deficits.  3. S/p PEG  4. Chronic kidney disease, Stage III  5. Sarcoidosis    INTERVAL HISTORY:      Ms. Perez returns to clinic for follow-up of her anemia. Since her last visit, she has been feeling more fatigued. She had EGD that was unremarkable. She denies any obvious blood loss, including GI bleeding. No dark stools.     Past Medical History, Past Social History and Past Family History have been reviewed and are unchanged except as noted in the interval history.    MEDICATIONS:     Prior to Admission medications    Medication Sig Start Date End Date Taking? Authorizing Provider   albuterol (PROVENTIL) 2.5 mg /3 mL (0.083 %) nebulizer solution Take 3 mLs (2.5 mg total) by nebulization every 6 (six) hours as needed for Wheezing. Rescue 17  Beverly Muniz MD   alprazolam (XANAX) 0.5 MG tablet 1/2 to 1 tab Q8 hours as needed for anxiety 17   Beverly Muniz MD   amlodipine (NORVASC) 10 MG tablet Take 1 tablet (10 mg total) by mouth once daily. 17  Beverly Muniz MD   aspirin (ECOTRIN) 81 MG EC tablet Take 81 mg by mouth once daily.      Historical Provider, MD   atorvastatin (LIPITOR) 40 MG tablet Take 1  "tablet (40 mg total) by mouth once daily. For Cholesterol 10/20/16   Beverly Muniz MD   BD INSULIN PEN NEEDLE UF SHORT 31 gauge x 5/16" Ndle USE TO INJECT NOVOLOG FLEXPEN BEFORE MEALS 5/29/17   Beatriz Aguilera DNP, NP   blood sugar diagnostic (ACCU-CHEK SMARTVIEW TEST STRIP) Strp 1 strip by Misc.(Non-Drug; Combo Route) route 2 (two) times daily. 11/5/15   Beatriz Aguilera DNP, NP   clotrimazole-betamethasone 1-0.05% (LOTRISONE) cream Apply topically 2 (two) times daily. Apply to area of rash/iting on buttocks 1-2x/day as needed 8/10/17   Beverly Muniz MD   colchicine 0.6 mg tablet 1 tab daily as needed for gout flare 6/16/16   Beverly Muniz MD   famotidine (PEPCID) 20 MG tablet TAKE 1 TABLET (20 MG TOTAL) BY MOUTH ONCE DAILY. 5/11/17   Beverly Muniz MD   ferrous sulfate 220 mg (44 mg iron)/5 mL solution 10ml daily for Iron/Give via PEG 8/23/17   Beverly Muniz MD   fluoxetine 20 MG tablet Take 1 tablet (20 mg total) by mouth once daily. 9/21/17   Beverly Muniz MD   folic acid (FOLVITE) 1 MG tablet Take 1 tablet (1 mg total) by mouth once daily. 9/14/17 9/14/18  Beverly Muniz MD   hydrALAZINE (APRESOLINE) 100 MG tablet Take 1 tablet (100 mg total) by mouth 3 (three) times daily. 2/22/17   Beverly Muniz MD   hydrochlorothiazide (HYDRODIURIL) 25 MG tablet Take 25 mg by mouth once daily.    Historical Provider, MD   insulin detemir (LEVEMIR FLEXTOUCH) 100 unit/mL (3 mL) SubQ InPn pen 14 units in AM  Patient taking differently: Inject 14 Units into the skin every evening.  7/12/17   Beverly Muniz MD   labetalol (NORMODYNE) 100 MG tablet Take 500 mg by mouth every 8 (eight) hours.    Historical Provider, MD   levetiracetam (KEPPRA) 500 MG Tab Take 1 tablet (500 mg total) by mouth every 8 (eight) hours. 8/10/17 8/10/18  Beverly Muniz MD   multivitamin with iron Tab 1/day  Patient taking differently: Take 1 tablet by mouth once daily.  6/20/16   Beverly Muniz MD " "  nut.tx.gluc.intol,lac-free,soy (GLUCERNA 1.5 NOAH) Liqd 1.5 cans in AM, 1 can at Noon , 1.5cans at Dinner, and 1 can before Bed/Total 5 cans daily 11/25/16   Beverly Muniz MD   nystatin (MYCOSTATIN) powder Apply topically 2 (two) times daily. Apply to irritated skin 2x/day as needed 8/10/17   Beverly Muniz MD   oxcarbazepine (TRILEPTAL) 150 MG Tab Take 1 tablet (150 mg total) by mouth 2 (two) times daily. 9/15/17 9/15/18  Pola Galloway MD   phenytoin (DILANTIN) 300 MG ER capsule Take 300 mg by mouth every evening.    Historical Provider, MD       ALLERGIES:   Review of patient's allergies indicates:   Allergen Reactions    Lisinopril Other (See Comments)     Angioedema      Vicodin [hydrocodone-acetaminophen] Rash        ROS:       Review of Systems   Constitutional: Negative for diaphoresis, fatigue, fever and unexpected weight change.   HENT:   Negative for lump/mass and sore throat.    Eyes: Negative for icterus.   Respiratory: Negative for cough and shortness of breath.    Cardiovascular: Negative for chest pain and palpitations.   Gastrointestinal: Negative for abdominal distention, constipation, diarrhea, nausea and vomiting.   Genitourinary: Negative for dysuria and frequency.    Musculoskeletal: Negative for arthralgias, gait problem and myalgias.   Skin: Negative for rash.   Neurological: Positive for headaches. Negative for dizziness and gait problem.   Hematological: Negative for adenopathy. Does not bruise/bleed easily.   Psychiatric/Behavioral: The patient is not nervous/anxious.        PHYSICAL EXAM:  Vitals:    02/01/18 1222   BP: (!) 149/69   Pulse: (!) 56   Resp: 20   Temp: 98 °F (36.7 °C)   TempSrc: Oral   SpO2: 96%   Height: 5' 3" (1.6 m)   PainSc:   5   PainLoc: Arm       Physical Exam   Constitutional: She is oriented to person, place, and time. She appears well-developed and well-nourished. No distress.   She is in a wheelchair.   HENT:   Mouth/Throat: Mucous membranes are " normal. No oral lesions.   S/p craniotomy   Eyes: Conjunctivae are normal.   Neck: No thyromegaly present.   Cardiovascular: Normal rate, regular rhythm and normal heart sounds.    No murmur heard.  Pulmonary/Chest: Breath sounds normal. She has no wheezes. She has no rales.   Abdominal: Soft. She exhibits no distension and no mass. There is no splenomegaly or hepatomegaly. There is no tenderness.   Lymphadenopathy:     She has no cervical adenopathy.        Right cervical: No deep cervical adenopathy present.       Left cervical: No deep cervical adenopathy present.     She has no axillary adenopathy.        Right: No inguinal adenopathy present.        Left: No inguinal adenopathy present.   Neurological: She is alert and oriented to person, place, and time. She has normal strength and normal reflexes. No cranial nerve deficit. Coordination normal.   She has marked weakness on the left compared with the right. Hyperreflexive on the left.     Skin: No rash noted.     LAB:   Results for orders placed or performed in visit on 02/01/18   CBC Without Differential   Result Value Ref Range    WBC 3.98 3.90 - 12.70 K/uL    RBC 2.44 (L) 4.00 - 5.40 M/uL    Hemoglobin 7.1 (L) 12.0 - 16.0 g/dL    Hematocrit 23.4 (L) 37.0 - 48.5 %    MCV 96 82 - 98 fL    MCH 29.1 27.0 - 31.0 pg    MCHC 30.3 (L) 32.0 - 36.0 g/dL    RDW 15.6 (H) 11.5 - 14.5 %    Platelets 174 150 - 350 K/uL    MPV 10.2 9.2 - 12.9 fL   Reticulocytes   Result Value Ref Range    Retic 1.4 0.5 - 2.5 %   BASIC METABOLIC PANEL   Result Value Ref Range    Sodium 141 136 - 145 mmol/L    Potassium 5.0 3.5 - 5.1 mmol/L    Chloride 114 (H) 95 - 110 mmol/L    CO2 19 (L) 23 - 29 mmol/L    Glucose 156 (H) 70 - 110 mg/dL    BUN, Bld 47 (H) 6 - 20 mg/dL    Creatinine 2.0 (H) 0.5 - 1.4 mg/dL    Calcium 9.2 8.7 - 10.5 mg/dL    Anion Gap 8 8 - 16 mmol/L    eGFR if African American 30.8 (A) >60 mL/min/1.73 m^2    eGFR if non  26.7 (A) >60 mL/min/1.73 m^2   Type &  Screen   Result Value Ref Range    Group & Rh O POS     Indirect Cha NEG        PROBLEMS ASSESSED THIS VISIT:    1. Symptomatic anemia    2. Left spastic hemiparesis    3. Partial symptomatic epilepsy with complex partial seizures, not intractable, without status epilepticus    4. PEG (percutaneous endoscopic gastrostomy) status    5. Anemia in stage 3 chronic kidney disease        PLAN:       Anemia in stage 3 chronic kidney disease  Ms. Perez has worsening anemia today. She has had fairly rapid drop in hemoglobin, more than would be expected for inadequate erythropoiesis. I have suggested that she may still have some as yet unfound source of blood loss.     Given her increasing fatigue, we will transfuse 1 unit of pRBCs today. I will also message GI to see if they will consider continued evaluation of her anemia.     Left spastic hemiparesis  Stable. She will continue to follow with neurology.     Partial symptomatic epilepsy with complex partial seizures, not intractable, without status epilepticus  No recent seizure activity. We will monitor. She follows with neurology.     PEG (percutaneous endoscopic gastrostomy) status  This does not appear to be the source of blood loss. It is required for her nutrition. Monitor.     Follow-up in 8 weeks    Mike Owen MD  Hematology and Stem Cell Transplant    Distress Screening Results: Psychosocial Distress screening score of Distress Score: 3 noted and reviewed. No intervention indicated. Not oncology related.     Documentation excellence: The query of aortic atherosclerosis is clinically undetermined for this patient, as the CT of the chest from 2016 is insufficient to establish this diagnosis.

## 2018-02-05 NOTE — ASSESSMENT & PLAN NOTE
Ms. Perez has worsening anemia today. She has had fairly rapid drop in hemoglobin, more than would be expected for inadequate erythropoiesis. I have suggested that she may still have some as yet unfound source of blood loss.     Given her increasing fatigue, we will transfuse 1 unit of pRBCs today. I will also message GI to see if they will consider continued evaluation of her anemia.

## 2018-02-06 NOTE — TELEPHONE ENCOUNTER
Alesia, please call [facility] and find out what this is, how to order it, and and if Insurance will pay.  Thanks   The patient is a 71y Female complaining of dental pain/injury.

## 2018-02-15 ENCOUNTER — HOSPITAL ENCOUNTER (EMERGENCY)
Facility: HOSPITAL | Age: 58
Discharge: HOME | End: 2018-02-15
Attending: EMERGENCY MEDICINE
Payer: MEDICARE

## 2018-02-15 VITALS
RESPIRATION RATE: 23 BRPM | OXYGEN SATURATION: 99 % | WEIGHT: 160 LBS | SYSTOLIC BLOOD PRESSURE: 150 MMHG | HEIGHT: 63 IN | HEART RATE: 56 BPM | BODY MASS INDEX: 28.35 KG/M2 | DIASTOLIC BLOOD PRESSURE: 77 MMHG | TEMPERATURE: 98 F

## 2018-02-15 DIAGNOSIS — D64.9 CHRONIC ANEMIA: ICD-10-CM

## 2018-02-15 DIAGNOSIS — E87.5 HYPERKALEMIA: Primary | ICD-10-CM

## 2018-02-15 DIAGNOSIS — N18.9 CHRONIC KIDNEY DISEASE, UNSPECIFIED CKD STAGE: ICD-10-CM

## 2018-02-15 DIAGNOSIS — R06.02 SOB (SHORTNESS OF BREATH): ICD-10-CM

## 2018-02-15 LAB
ALBUMIN SERPL BCP-MCNC: 3.7 G/DL
ALP SERPL-CCNC: 82 U/L
ALT SERPL W/O P-5'-P-CCNC: 14 U/L
ANION GAP SERPL CALC-SCNC: 8 MMOL/L
AST SERPL-CCNC: 12 U/L
BASOPHILS # BLD AUTO: 0.05 K/UL
BASOPHILS NFR BLD: 1 %
BILIRUB SERPL-MCNC: 0.4 MG/DL
BUN SERPL-MCNC: 70 MG/DL
CALCIUM SERPL-MCNC: 9.5 MG/DL
CHLORIDE SERPL-SCNC: 118 MMOL/L
CO2 SERPL-SCNC: 16 MMOL/L
CREAT SERPL-MCNC: 2.3 MG/DL
DIFFERENTIAL METHOD: ABNORMAL
EOSINOPHIL # BLD AUTO: 0.2 K/UL
EOSINOPHIL NFR BLD: 4.4 %
ERYTHROCYTE [DISTWIDTH] IN BLOOD BY AUTOMATED COUNT: 15.5 %
EST. GFR  (AFRICAN AMERICAN): 26 ML/MIN/1.73 M^2
EST. GFR  (NON AFRICAN AMERICAN): 22.6 ML/MIN/1.73 M^2
FLUAV AG SPEC QL IA: NEGATIVE
FLUBV AG SPEC QL IA: NEGATIVE
GLUCOSE SERPL-MCNC: 157 MG/DL
HCT VFR BLD AUTO: 26.9 %
HGB BLD-MCNC: 8.1 G/DL
IMM GRANULOCYTES # BLD AUTO: 0.01 K/UL
IMM GRANULOCYTES NFR BLD AUTO: 0.2 %
LYMPHOCYTES # BLD AUTO: 0.7 K/UL
LYMPHOCYTES NFR BLD: 14.6 %
MCH RBC QN AUTO: 28.8 PG
MCHC RBC AUTO-ENTMCNC: 30.1 G/DL
MCV RBC AUTO: 96 FL
MONOCYTES # BLD AUTO: 0.3 K/UL
MONOCYTES NFR BLD: 5.9 %
NEUTROPHILS # BLD AUTO: 3.5 K/UL
NEUTROPHILS NFR BLD: 73.9 %
NRBC BLD-RTO: 0 /100 WBC
PLATELET # BLD AUTO: 172 K/UL
PMV BLD AUTO: 9.6 FL
POCT GLUCOSE: 118 MG/DL (ref 70–110)
POTASSIUM SERPL-SCNC: 6.1 MMOL/L
PROT SERPL-MCNC: 7.4 G/DL
RBC # BLD AUTO: 2.81 M/UL
SODIUM SERPL-SCNC: 142 MMOL/L
SPECIMEN SOURCE: NORMAL
WBC # BLD AUTO: 4.78 K/UL

## 2018-02-15 PROCEDURE — 25000242 PHARM REV CODE 250 ALT 637 W/ HCPCS

## 2018-02-15 PROCEDURE — 99284 EMERGENCY DEPT VISIT MOD MDM: CPT | Mod: ,,, | Performed by: NURSE PRACTITIONER

## 2018-02-15 PROCEDURE — 87400 INFLUENZA A/B EACH AG IA: CPT | Mod: 59

## 2018-02-15 PROCEDURE — 63600175 PHARM REV CODE 636 W HCPCS: Performed by: NURSE PRACTITIONER

## 2018-02-15 PROCEDURE — 96375 TX/PRO/DX INJ NEW DRUG ADDON: CPT

## 2018-02-15 PROCEDURE — 99284 EMERGENCY DEPT VISIT MOD MDM: CPT | Mod: 25

## 2018-02-15 PROCEDURE — 96374 THER/PROPH/DIAG INJ IV PUSH: CPT

## 2018-02-15 PROCEDURE — 82962 GLUCOSE BLOOD TEST: CPT

## 2018-02-15 PROCEDURE — 80053 COMPREHEN METABOLIC PANEL: CPT

## 2018-02-15 PROCEDURE — 93005 ELECTROCARDIOGRAM TRACING: CPT

## 2018-02-15 PROCEDURE — 94640 AIRWAY INHALATION TREATMENT: CPT

## 2018-02-15 PROCEDURE — 25000003 PHARM REV CODE 250: Performed by: NURSE PRACTITIONER

## 2018-02-15 PROCEDURE — 93010 ELECTROCARDIOGRAM REPORT: CPT | Mod: ,,, | Performed by: INTERNAL MEDICINE

## 2018-02-15 PROCEDURE — 85025 COMPLETE CBC W/AUTO DIFF WBC: CPT

## 2018-02-15 RX ORDER — IPRATROPIUM BROMIDE AND ALBUTEROL SULFATE 2.5; .5 MG/3ML; MG/3ML
SOLUTION RESPIRATORY (INHALATION)
Status: COMPLETED
Start: 2018-02-15 | End: 2018-02-15

## 2018-02-15 RX ORDER — IPRATROPIUM BROMIDE AND ALBUTEROL SULFATE 2.5; .5 MG/3ML; MG/3ML
3 SOLUTION RESPIRATORY (INHALATION)
Status: COMPLETED | OUTPATIENT
Start: 2018-02-15 | End: 2018-02-15

## 2018-02-15 RX ORDER — DEXTROSE 50 % IN WATER (D50W) INTRAVENOUS SYRINGE
25
Status: COMPLETED | OUTPATIENT
Start: 2018-02-15 | End: 2018-02-15

## 2018-02-15 RX ADMIN — IPRATROPIUM BROMIDE AND ALBUTEROL SULFATE 3 ML: .5; 3 SOLUTION RESPIRATORY (INHALATION) at 04:02

## 2018-02-15 RX ADMIN — IPRATROPIUM BROMIDE AND ALBUTEROL SULFATE 3 ML: 2.5; .5 SOLUTION RESPIRATORY (INHALATION) at 04:02

## 2018-02-15 RX ADMIN — DEXTROSE MONOHYDRATE 25 G: 25 INJECTION, SOLUTION INTRAVENOUS at 03:02

## 2018-02-15 RX ADMIN — SODIUM POLYSTYRENE SULFONATE 15 G: 15 SUSPENSION ORAL; RECTAL at 03:02

## 2018-02-15 RX ADMIN — INSULIN HUMAN 10 UNITS: 100 INJECTION, SOLUTION PARENTERAL at 03:02

## 2018-02-15 NOTE — ED NOTES
PT PRESENTED TO ER WITH C/O SOB PAST THE PAST FEW DAYS. NO ACUTE DISTRESS NOTED. PT PLACED ON CARDIAC MONITOR. 02 SAT 97-99% ON RA. FAMILY AT BEDSIDE. WILL CONTINUE TO MONITOR.

## 2018-02-15 NOTE — ED PROVIDER NOTES
Encounter Date: 2/15/2018       History     Chief Complaint   Patient presents with    Wheezing    Abdominal Pain     peg tube bleeding, wheelchair bound     The history is provided by the patient and the spouse.   Wheezing    Illness onset: 3 days. The problem occurs continuously. The problem has been gradually worsening. The problem is moderate. Nothing relieves the symptoms. Associated symptoms include shortness of breath and wheezing. Pertinent negatives include no chest pain, no chest pressure, no fever, no sore throat, no stridor and no cough. She has been behaving normally. Urine output has been normal. The last void occurred less than 6 hours ago. There were sick contacts at home.   Abdominal Pain   The current episode started two days ago. The onset of the illness was abrupt. The problem has been gradually improving. Pain location: peg site. The abdominal pain does not radiate. The other symptoms of the illness include shortness of breath. The other symptoms of the illness do not include fever.     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    Cortical cataract of both eyes 7/26/2016    Degeneration of lumbar or lumbosacral intervertebral disc 3/5/2013    S/p MRI L-spine 5/2009     Depression     General anesthetics causing adverse effect in therapeutic use     Hemorrhagic cerebrovascular accident (CVA)     8/2016 s/p Hemicraniotomy at [facility] with Left hemiparesis    Hemorrhagic cerebrovascular accident (CVA) 1/3/2018    History of  stroke 6/28/2017    s/p R-MCA stroke with R-putaminal hemorrhagic transformation in 8/2016 and 11/2016 (s/p hemicraniotomy at [facility]) with residual L hemiparesis, on AED s/p CVA      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    Hyperlipidemia     Hypertensive retinopathy of both eyes 7/26/2016    Impingement syndrome of right shoulder 7/2/2014    Left ventricular diastolic dysfunction with preserved systolic function 12/15/2015    Mixed anxiety and depressive disorder 3/5/2013    Obesity     THERESA (obstructive sleep apnea) 3/5/2013    No Home CPAP 2ndary to cost     Partial symptomatic epilepsy with complex partial seizures, not intractable, without status epilepticus     PEG (percutaneous endoscopic gastrostomy) status 6/28/2017    Renovascular hypertension 3/5/2013    Will resume home medications: amlodipine, labetalol, and hydralazine Will hold Lisinopril in setting of DARLING.   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, without long-term current use of insulin 8/2/2017    Type 2 diabetes mellitus with diabetic polyneuropathy, without long-term current use of insulin 10/22/2015    Type 2 diabetes mellitus with stage 3 chronic kidney disease, without long-term current use of insulin 10/22/2015    Vertebral artery stenosis 3/5/2013    S/p Stenting per Dr Burnett      Past Surgical History:   Procedure Laterality Date    BREAST SURGERY      breast reduction    COLONOSCOPY N/A 8/11/2016    Procedure: COLONOSCOPY;  Surgeon: Jerry Vilchis MD;  Location: [facility] ([address] [place] [address]);  Service: Endoscopy;  Laterality: N/A;  Patient reports difficulty awaking from anesthesia in the past.    HYSTERECTOMY      ROTATOR CUFF REPAIR  "Right July 9, 2014    right side    Skull surgery      Aneurysm    stent placed      in vertebral artery    TUBAL LIGATION       Family History   Problem Relation Age of Onset    Cancer Mother 55     Breast cancer    Diabetes Mother     Hypertension Mother     Heart disease Mother     Heart attack Mother     Stroke Sister     Hypertension Sister     Sleep apnea Sister     No Known Problems Daughter     No Known Problems Son     Bell's palsy Sister     Lupus Sister     Blindness Maternal Grandmother     Diabetes       "My entire family family has diabetes"    Stroke Maternal Aunt     Amblyopia Neg Hx     Cataracts Neg Hx     Glaucoma Neg Hx     Macular degeneration Neg Hx     Retinal detachment Neg Hx     Strabismus Neg Hx      Social History   Substance Use Topics    Smoking status: Never Smoker    Smokeless tobacco: Never Used    Alcohol use No      Comment: occasional wine cooler      Review of Systems   Constitutional: Negative for fever.   HENT: Negative for sore throat.    Respiratory: Positive for shortness of breath and wheezing. Negative for cough and stridor.    Cardiovascular: Negative for chest pain.   Gastrointestinal: Positive for abdominal pain.       Physical Exam     Initial Vitals [02/15/18 1201]   BP Pulse Resp Temp SpO2   135/63 (!) 55 20 98.1 °F (36.7 °C) 98 %      MAP       87         Physical Exam    Nursing note and vitals reviewed.  Constitutional: She appears well-developed and well-nourished.  Non-toxic appearance.   HENT:   Head: Normocephalic and atraumatic.   Right Ear: Hearing, tympanic membrane, external ear and ear canal normal.   Left Ear: Hearing, tympanic membrane, external ear and ear canal normal.   Nose: Nose normal.   Mouth/Throat: Uvula is midline, oropharynx is clear and moist and mucous membranes are normal.   Eyes: Conjunctivae and EOM are normal.   Neck: Full passive range of motion without pain. Neck supple.   Cardiovascular: Normal rate. "   Pulses:       Radial pulses are 2+ on the right side, and 2+ on the left side.        Dorsalis pedis pulses are 1+ on the right side, and 1+ on the left side.   Pulmonary/Chest: Effort normal. No respiratory distress. She has decreased breath sounds (bilateral, mild). She has no wheezes. She has rales (bilateral, scattered).   Abdominal: Soft. Bowel sounds are normal. She exhibits no distension and no mass. There is no tenderness. There is no rigidity, no rebound and no guarding. No hernia.   Peg site with minimal serosanguineous drainage; No bleeding or erythema    Neurological: She is alert and oriented to person, place, and time. She has normal strength. Gait abnormal.   Left-sided hemiparesis   Skin: Skin is warm, dry and intact. Capillary refill takes less than 2 seconds. No rash noted.   Psychiatric: She has a normal mood and affect. Her behavior is normal. Judgment and thought content normal. Her speech is delayed (slow). Cognition and memory are normal.         ED Course   Procedures  Labs Reviewed   CBC W/ AUTO DIFFERENTIAL - Abnormal; Notable for the following:        Result Value    RBC 2.81 (*)     Hemoglobin 8.1 (*)     Hematocrit 26.9 (*)     MCHC 30.1 (*)     RDW 15.5 (*)     Lymph # 0.7 (*)     Gran% 73.9 (*)     Lymph% 14.6 (*)     All other components within normal limits   COMPREHENSIVE METABOLIC PANEL - Abnormal; Notable for the following:     Potassium 6.1 (*)     Chloride 118 (*)     CO2 16 (*)     Glucose 157 (*)     BUN, Bld 70 (*)     Creatinine 2.3 (*)     eGFR if  26.0 (*)     eGFR if non  22.6 (*)     All other components within normal limits   POCT GLUCOSE - Abnormal; Notable for the following:     POCT Glucose 118 (*)     All other components within normal limits   INFLUENZA A AND B ANTIGEN     EKG Readings: (Independently Interpreted)   EKG: Sinus rhythm at 56, no ischemic changes       X-Rays:   Independently Interpreted Readings:   Chest X-Ray: No  acute abnormalities. Cardiomegaly present.     Medical Decision Making:   History:   Old Medical Records: I decided to obtain old medical records.  Differential Diagnosis:   Pneumonia  CHF  Asthma  Pneumothorax  Bronchitis  Sepsis  Dehydration  Acute Renal Failure  Clinical Tests:   Lab Tests: Ordered and Reviewed  Radiological Study: Ordered and Reviewed  Medical Tests: Ordered and Reviewed  ED Management:  The patient is a 57-year-old female with a past medical history of rheumatoid arthritis, hyperlipidemia, sarcoidosis, asthma, obesity, PEG tube placement, chronic kidney disease, diabetes, anemia, and a right-sided MCA CVA that converted to a subarachnoid hemorrhage with emergent craniotomy in August 2016 that has left her with left-sided hemiparesis who presents with worsening wheezing and shortness of breath since Monday. The patient is afebrile, non-toxic appearing, and hemodynamically stable.  Patient is also complaining of pain at her PEG tube site after getting it caught on her bra strap several days ago.  Denies fever and chest pain.    Physical exam is notable for mild decreased breath sounds and scattered rales bilaterally. No wheezing on auscultation. No sign of bleeding or active infection at the PEG tube site.  Abdomen is soft and nontender.  Neuro status is at baseline for patient.    ECG does not indicate acute ischemia. I independently reviewed and interpreted labs which are notable for hyperkalemia, stable chronic kidney disease, and stable chronic anemia. Labs otherwise within acceptable limits.  Chest x-ray is clear with no evidence of pleural effusion.    Potassium level will be shifted utilizing D50 and insulin, as well as one ordered Kayexalate.    Based on history and physical exam, as well as diagnostic results, I considered but do not suspect pneumonia, CHF, asthma, pneumothorax, bronchitis, sepsis, dehydration, or acute renal failure.  Clinical presentation demonstrates hyperkalemia in  the setting of stable chronic kidney disease with stable chronic anemia. Patient will be discharged with a 3 day course of Kayexalate and instructed to follow up with primary care provider in 3 days for repeat blood work.  Return precautions given. Case discussed with supervising physician who agrees with plan.                    Attending Attestation:     Physician Attestation Statement for NP/PA:   I discussed this assessment and plan of this patient with the NP/PA, but I did not personally examine the patient. The face to face encounter was performed by the NP/PA.    Other NP/PA Attestation Additions:      Medical Decision Makin-year-old female presenting with shortness of breath associated with wheezing.  She also complains of epigastric abdominal pain at location of her PEG site.  She states there was some minimal bleeding surrounding the area.  Labs reviewed which were significant for mild hyperkalemia.  Patient has baseline CKD.  Will treat with insulin, dextrose, and Kayexalate.  She has no EKG changes.  I do not think she requires admission.  Patient will be discharged to follow up with PCP for repeat labs..  Patient's requesting an albuterol treatment.       Physician Attestation for Scribe:      Comments: I, Dr. Bailey Mortensen, personally performed the services described in this documentation. All medical record entries made by the scribe were at my direction and in my presence.  I have reviewed the chart and agree that the record reflects my personal performance and is accurate and complete. Bailey Mortensen MD.                 Clinical Impression:   The primary encounter diagnosis was Hyperkalemia. Diagnoses of SOB (shortness of breath), Chronic kidney disease, unspecified CKD stage, and Chronic anemia were also pertinent to this visit.    Disposition:   Disposition: Discharged  Condition: Stable                        Paige Mariscal NP  02/15/18 1617       Bailey Mortensen MD  18 [phone]

## 2018-02-16 ENCOUNTER — TELEPHONE (OUTPATIENT)
Dept: INTERNAL MEDICINE | Facility: CLINIC | Age: 58
End: 2018-02-16

## 2018-02-16 DIAGNOSIS — E87.5 HYPERKALEMIA: Primary | ICD-10-CM

## 2018-02-16 NOTE — TELEPHONE ENCOUNTER
----- Message from Florencio Liya sent at 2/15/2018  4:54 PM CST -----  Contact: Yvan/Reina [phone]  The patient is having a hard time breathing and her bone is hurting and it could be because the mattress has a spring bulging thru, there for she is requesting orders for an air matress.    She went to the E.R. Today and was released.    She would like an E.R. Follow up appointment before April.    Thank you

## 2018-02-16 NOTE — TELEPHONE ENCOUNTER
Spoke to cecil, and was informed that the medication hasn't been filled. Pt is still sleepy, and short of breath. Ms mando and I advised that prescription really need to get filled and if her breathing continues to get worst she should go back to Er. Informed her of lab appt on //2/21/2018.

## 2018-02-16 NOTE — TELEPHONE ENCOUNTER
Aleyda/Shandra, please call pt's daughter, Reina to schedule a recheck lab(K+) next Wed 2/21 when pt comes into [place] to see Dr Pardo; I've placed the lab orders.  Also, please call [facility] to request a new air mattress for Hospital bed; I'm having problems ordering it through [facility]. Let me know what I need to do.  Thanks

## 2018-02-16 NOTE — TELEPHONE ENCOUNTER
Aleyda/Cecy, [facility] how to get [facility] authorized for a new air mattress for her hospital bed on Monday AM.  Thanks

## 2018-02-16 NOTE — TELEPHONE ENCOUNTER
Gm pt went to er yesterday complaining of shortness of breath, was given breathing treatments and discharged. Was told all of her blood work was normal except potassium was high. Er Dr prescribed pt sodium polystyreme, kayexalate, 15 g/ml. Was told to follow up with PCP within 2 days. Pt is aslo requesting a new air mattress cause springs is bad in current one and its making he tailbone hurt. Pt no longer has home health.    Thank you Aleyda

## 2018-02-20 ENCOUNTER — TELEPHONE (OUTPATIENT)
Dept: INTERNAL MEDICINE | Facility: CLINIC | Age: 58
End: 2018-02-20

## 2018-02-20 ENCOUNTER — TELEPHONE (OUTPATIENT)
Dept: PHYSICAL MEDICINE AND REHAB | Facility: CLINIC | Age: 58
End: 2018-02-20

## 2018-02-20 DIAGNOSIS — R25.2 SPASTICITY: Primary | ICD-10-CM

## 2018-02-20 NOTE — TELEPHONE ENCOUNTER
Dr. Pardo can you put HH orders for to have the PT/OT please, thanks and let me know when it's done so I can let the daughter now,

## 2018-02-20 NOTE — TELEPHONE ENCOUNTER
----- Message from Monty Pardo MD sent at 2/20/2018  3:02 PM CST -----  Contact: Reina (daughter) @ [phone]  I would recommend she get some therapy first and then can reschedule to see me in a few weeks. Thanks so much!    ----- Message -----  From: Wilber Alas  Sent: 2/20/2018   2:46 PM  To: Monty Pardo MD    Do you want her to therapy first and then come in or come to visit on tomorrow  ----- Message -----  From: Dipti Christian  Sent: 2/20/2018   2:43 PM  To: Char Millan Staff    Pt was last seen on 12-20-17 and was given a botox injection.  Pt was suppose to be set up with home health physical therapy but it was never set up.  Pts daughter is calling to see if she should still come in for her appt on tomorrow since she has not done any therapy yet.  Pls call.

## 2018-02-20 NOTE — TELEPHONE ENCOUNTER
Cecy please tell Dr Muniz to write order for air mattress on script and with snap shot sheet and last visit with [facility]     P S: my extension is [phone]

## 2018-02-21 ENCOUNTER — TELEPHONE (OUTPATIENT)
Dept: INTERNAL MEDICINE | Facility: CLINIC | Age: 58
End: 2018-02-21

## 2018-02-21 ENCOUNTER — HOSPITAL ENCOUNTER (INPATIENT)
Facility: OTHER | Age: 58
LOS: 6 days | Discharge: HOME HEALTH | DRG: 291 | End: 2018-02-27
Attending: EMERGENCY MEDICINE | Admitting: HOSPITALIST
Payer: MEDICARE

## 2018-02-21 DIAGNOSIS — E11.22 CONTROLLED TYPE 2 DIABETES MELLITUS WITH STAGE 3 CHRONIC KIDNEY DISEASE, WITHOUT LONG-TERM CURRENT USE OF INSULIN: ICD-10-CM

## 2018-02-21 DIAGNOSIS — N18.30 STAGE 3 CHRONIC KIDNEY DISEASE: ICD-10-CM

## 2018-02-21 DIAGNOSIS — N18.30 CONTROLLED TYPE 2 DIABETES MELLITUS WITH STAGE 3 CHRONIC KIDNEY DISEASE, WITHOUT LONG-TERM CURRENT USE OF INSULIN: ICD-10-CM

## 2018-02-21 DIAGNOSIS — R06.02 SHORTNESS OF BREATH: ICD-10-CM

## 2018-02-21 DIAGNOSIS — N18.30 ANEMIA IN STAGE 3 CHRONIC KIDNEY DISEASE: ICD-10-CM

## 2018-02-21 DIAGNOSIS — I50.33 ACUTE ON CHRONIC DIASTOLIC HEART FAILURE: ICD-10-CM

## 2018-02-21 DIAGNOSIS — J96.01 ACUTE RESPIRATORY FAILURE WITH HYPOXIA: ICD-10-CM

## 2018-02-21 DIAGNOSIS — R09.02 HYPOXIA: Primary | ICD-10-CM

## 2018-02-21 DIAGNOSIS — Z93.1 PEG (PERCUTANEOUS ENDOSCOPIC GASTROSTOMY) STATUS: Chronic | ICD-10-CM

## 2018-02-21 DIAGNOSIS — I10 ESSENTIAL HYPERTENSION: ICD-10-CM

## 2018-02-21 DIAGNOSIS — I15.0 RENOVASCULAR HYPERTENSION: Chronic | ICD-10-CM

## 2018-02-21 DIAGNOSIS — D63.1 ANEMIA IN STAGE 3 CHRONIC KIDNEY DISEASE: ICD-10-CM

## 2018-02-21 DIAGNOSIS — J81.0 ACUTE PULMONARY EDEMA: ICD-10-CM

## 2018-02-21 DIAGNOSIS — J81.1 PULMONARY EDEMA: ICD-10-CM

## 2018-02-21 DIAGNOSIS — Z86.73 HISTORY OF STROKE: ICD-10-CM

## 2018-02-21 PROBLEM — R06.2 WHEEZING: Status: ACTIVE | Noted: 2018-02-21

## 2018-02-21 PROBLEM — J18.9 PNEUMONIA DUE TO INFECTIOUS ORGANISM: Status: ACTIVE | Noted: 2018-02-21

## 2018-02-21 PROBLEM — R60.0 EDEMA OF LEFT LOWER EXTREMITY: Status: ACTIVE | Noted: 2018-02-21

## 2018-02-21 LAB
ALBUMIN SERPL BCP-MCNC: 3.8 G/DL
ALLENS TEST: ABNORMAL
ALP SERPL-CCNC: 86 U/L
ALT SERPL W/O P-5'-P-CCNC: 11 U/L
AMORPH CRY URNS QL MICRO: NORMAL
ANION GAP SERPL CALC-SCNC: 13 MMOL/L
AST SERPL-CCNC: 12 U/L
BACTERIA #/AREA URNS HPF: NORMAL /HPF
BASOPHILS # BLD AUTO: 0.04 K/UL
BASOPHILS NFR BLD: 0.7 %
BILIRUB SERPL-MCNC: 0.5 MG/DL
BILIRUB UR QL STRIP: NEGATIVE
BNP SERPL-MCNC: 546 PG/ML
BUN SERPL-MCNC: 62 MG/DL
CALCIUM SERPL-MCNC: 9.2 MG/DL
CHLORIDE SERPL-SCNC: 120 MMOL/L
CK SERPL-CCNC: 35 U/L
CLARITY UR: CLEAR
CO2 SERPL-SCNC: 13 MMOL/L
COLOR UR: YELLOW
CREAT SERPL-MCNC: 2.1 MG/DL
DELSYS: ABNORMAL
DIASTOLIC DYSFUNCTION: YES
DIFFERENTIAL METHOD: ABNORMAL
EOSINOPHIL # BLD AUTO: 0.2 K/UL
EOSINOPHIL NFR BLD: 3.2 %
EP: 5
ERYTHROCYTE [DISTWIDTH] IN BLOOD BY AUTOMATED COUNT: 16 %
EST. GFR  (AFRICAN AMERICAN): 29 ML/MIN/1.73 M^2
EST. GFR  (NON AFRICAN AMERICAN): 25 ML/MIN/1.73 M^2
ESTIMATED PA SYSTOLIC PRESSURE: 40.7
FIO2: 30
FLUAV AG SPEC QL IA: NEGATIVE
FLUBV AG SPEC QL IA: NEGATIVE
GLOBAL PERICARDIAL EFFUSION: ABNORMAL
GLUCOSE SERPL-MCNC: 150 MG/DL
GLUCOSE UR QL STRIP: NEGATIVE
HCO3 UR-SCNC: 17 MMOL/L (ref 24–28)
HCT VFR BLD AUTO: 25.1 %
HGB BLD-MCNC: 7.7 G/DL
HGB UR QL STRIP: NEGATIVE
HYALINE CASTS #/AREA URNS LPF: 1 /LPF
IP: 10
KETONES UR QL STRIP: NEGATIVE
LACTATE SERPL-SCNC: 0.5 MMOL/L
LEUKOCYTE ESTERASE UR QL STRIP: NEGATIVE
LYMPHOCYTES # BLD AUTO: 0.5 K/UL
LYMPHOCYTES NFR BLD: 9.5 %
MCH RBC QN AUTO: 29.1 PG
MCHC RBC AUTO-ENTMCNC: 30.7 G/DL
MCV RBC AUTO: 95 FL
MICROSCOPIC COMMENT: NORMAL
MIN VOL: 13.3
MITRAL VALVE REGURGITATION: ABNORMAL
MODE: ABNORMAL
MONOCYTES # BLD AUTO: 0.3 K/UL
MONOCYTES NFR BLD: 4.4 %
NEUTROPHILS # BLD AUTO: 4.7 K/UL
NEUTROPHILS NFR BLD: 81.8 %
NITRITE UR QL STRIP: NEGATIVE
PCO2 BLDA: 35 MMHG (ref 35–45)
PH SMN: 7.29 [PH] (ref 7.35–7.45)
PH UR STRIP: 5 [PH] (ref 5–8)
PLATELET # BLD AUTO: 106 K/UL
PMV BLD AUTO: 10.4 FL
PO2 BLDA: 79 MMHG (ref 80–100)
POC BE: -10 MMOL/L
POC SATURATED O2: 94 % (ref 95–100)
POTASSIUM SERPL-SCNC: 4.3 MMOL/L
PROCALCITONIN SERPL IA-MCNC: 0.13 NG/ML
PROT SERPL-MCNC: 7.2 G/DL
PROT UR QL STRIP: ABNORMAL
RBC # BLD AUTO: 2.65 M/UL
RBC #/AREA URNS HPF: 2 /HPF (ref 0–4)
RETIRED EF AND QEF - SEE NOTES: 65 (ref 55–65)
SAMPLE: ABNORMAL
SITE: ABNORMAL
SODIUM SERPL-SCNC: 146 MMOL/L
SP GR UR STRIP: 1.02 (ref 1–1.03)
SPECIMEN SOURCE: NORMAL
SPONT RATE: 26
TRICUSPID VALVE REGURGITATION: ABNORMAL
TROPONIN I SERPL DL<=0.01 NG/ML-MCNC: 0.02 NG/ML
URN SPEC COLLECT METH UR: ABNORMAL
UROBILINOGEN UR STRIP-ACNC: NEGATIVE EU/DL
WBC # BLD AUTO: 5.71 K/UL
WBC #/AREA URNS HPF: 1 /HPF (ref 0–5)

## 2018-02-21 PROCEDURE — 99223 1ST HOSP IP/OBS HIGH 75: CPT | Mod: ,,, | Performed by: HOSPITALIST

## 2018-02-21 PROCEDURE — 63600175 PHARM REV CODE 636 W HCPCS: Performed by: HOSPITALIST

## 2018-02-21 PROCEDURE — 25000003 PHARM REV CODE 250: Performed by: HOSPITALIST

## 2018-02-21 PROCEDURE — 81000 URINALYSIS NONAUTO W/SCOPE: CPT

## 2018-02-21 PROCEDURE — 83605 ASSAY OF LACTIC ACID: CPT

## 2018-02-21 PROCEDURE — 63600175 PHARM REV CODE 636 W HCPCS: Performed by: EMERGENCY MEDICINE

## 2018-02-21 PROCEDURE — 83880 ASSAY OF NATRIURETIC PEPTIDE: CPT

## 2018-02-21 PROCEDURE — 96366 THER/PROPH/DIAG IV INF ADDON: CPT

## 2018-02-21 PROCEDURE — 94640 AIRWAY INHALATION TREATMENT: CPT

## 2018-02-21 PROCEDURE — 93010 ELECTROCARDIOGRAM REPORT: CPT | Mod: ,,, | Performed by: INTERNAL MEDICINE

## 2018-02-21 PROCEDURE — 96365 THER/PROPH/DIAG IV INF INIT: CPT

## 2018-02-21 PROCEDURE — 96375 TX/PRO/DX INJ NEW DRUG ADDON: CPT

## 2018-02-21 PROCEDURE — 85025 COMPLETE CBC W/AUTO DIFF WBC: CPT

## 2018-02-21 PROCEDURE — 20000000 HC ICU ROOM

## 2018-02-21 PROCEDURE — 87400 INFLUENZA A/B EACH AG IA: CPT

## 2018-02-21 PROCEDURE — 94660 CPAP INITIATION&MGMT: CPT

## 2018-02-21 PROCEDURE — 87040 BLOOD CULTURE FOR BACTERIA: CPT | Mod: 59

## 2018-02-21 PROCEDURE — 99900035 HC TECH TIME PER 15 MIN (STAT)

## 2018-02-21 PROCEDURE — 27000221 HC OXYGEN, UP TO 24 HOURS

## 2018-02-21 PROCEDURE — 36600 WITHDRAWAL OF ARTERIAL BLOOD: CPT

## 2018-02-21 PROCEDURE — 84484 ASSAY OF TROPONIN QUANT: CPT

## 2018-02-21 PROCEDURE — 25000242 PHARM REV CODE 250 ALT 637 W/ HCPCS: Performed by: HOSPITALIST

## 2018-02-21 PROCEDURE — 27000190 HC CPAP FULL FACE MASK W/VALVE

## 2018-02-21 PROCEDURE — 82803 BLOOD GASES ANY COMBINATION: CPT

## 2018-02-21 PROCEDURE — 51702 INSERT TEMP BLADDER CATH: CPT

## 2018-02-21 PROCEDURE — 80053 COMPREHEN METABOLIC PANEL: CPT

## 2018-02-21 PROCEDURE — 94761 N-INVAS EAR/PLS OXIMETRY MLT: CPT

## 2018-02-21 PROCEDURE — 93306 TTE W/DOPPLER COMPLETE: CPT

## 2018-02-21 PROCEDURE — 84145 PROCALCITONIN (PCT): CPT

## 2018-02-21 PROCEDURE — 82550 ASSAY OF CK (CPK): CPT

## 2018-02-21 PROCEDURE — 93005 ELECTROCARDIOGRAM TRACING: CPT

## 2018-02-21 PROCEDURE — 99291 CRITICAL CARE FIRST HOUR: CPT

## 2018-02-21 PROCEDURE — 96368 THER/DIAG CONCURRENT INF: CPT

## 2018-02-21 RX ORDER — IPRATROPIUM BROMIDE AND ALBUTEROL SULFATE 2.5; .5 MG/3ML; MG/3ML
3 SOLUTION RESPIRATORY (INHALATION)
Status: DISCONTINUED | OUTPATIENT
Start: 2018-02-22 | End: 2018-02-27 | Stop reason: HOSPADM

## 2018-02-21 RX ORDER — FAMOTIDINE 20 MG/1
20 TABLET, FILM COATED ORAL DAILY
Status: DISCONTINUED | OUTPATIENT
Start: 2018-02-21 | End: 2018-02-27 | Stop reason: HOSPADM

## 2018-02-21 RX ORDER — ATORVASTATIN CALCIUM 20 MG/1
40 TABLET, FILM COATED ORAL DAILY
Status: DISCONTINUED | OUTPATIENT
Start: 2018-02-21 | End: 2018-02-27 | Stop reason: HOSPADM

## 2018-02-21 RX ORDER — PREGABALIN 25 MG/1
25 CAPSULE ORAL NIGHTLY PRN
Status: DISCONTINUED | OUTPATIENT
Start: 2018-02-21 | End: 2018-02-27 | Stop reason: HOSPADM

## 2018-02-21 RX ORDER — ALBUTEROL SULFATE 0.83 MG/ML
2.5 SOLUTION RESPIRATORY (INHALATION) ONCE
Status: COMPLETED | OUTPATIENT
Start: 2018-02-21 | End: 2018-02-21

## 2018-02-21 RX ORDER — ALBUTEROL SULFATE 0.83 MG/ML
2.5 SOLUTION RESPIRATORY (INHALATION) EVERY 4 HOURS
Status: DISCONTINUED | OUTPATIENT
Start: 2018-02-21 | End: 2018-02-21

## 2018-02-21 RX ORDER — ALBUTEROL SULFATE 0.83 MG/ML
2.5 SOLUTION RESPIRATORY (INHALATION)
Status: DISCONTINUED | OUTPATIENT
Start: 2018-02-21 | End: 2018-02-21

## 2018-02-21 RX ORDER — AMLODIPINE BESYLATE 5 MG/1
10 TABLET ORAL DAILY
Status: DISCONTINUED | OUTPATIENT
Start: 2018-02-21 | End: 2018-02-25

## 2018-02-21 RX ORDER — RAMELTEON 8 MG/1
8 TABLET ORAL NIGHTLY PRN
Status: DISCONTINUED | OUTPATIENT
Start: 2018-02-21 | End: 2018-02-27 | Stop reason: HOSPADM

## 2018-02-21 RX ORDER — OSELTAMIVIR PHOSPHATE 6 MG/ML
30 FOR SUSPENSION ORAL 2 TIMES DAILY
Status: DISCONTINUED | OUTPATIENT
Start: 2018-02-21 | End: 2018-02-22

## 2018-02-21 RX ORDER — FUROSEMIDE 10 MG/ML
80 INJECTION INTRAMUSCULAR; INTRAVENOUS ONCE
Status: COMPLETED | OUTPATIENT
Start: 2018-02-21 | End: 2018-02-21

## 2018-02-21 RX ORDER — LABETALOL 200 MG/1
200 TABLET, FILM COATED ORAL EVERY 12 HOURS
Status: DISCONTINUED | OUTPATIENT
Start: 2018-02-21 | End: 2018-02-25

## 2018-02-21 RX ORDER — HEPARIN SODIUM 5000 [USP'U]/ML
5000 INJECTION, SOLUTION INTRAVENOUS; SUBCUTANEOUS EVERY 8 HOURS
Status: DISCONTINUED | OUTPATIENT
Start: 2018-02-21 | End: 2018-02-27 | Stop reason: HOSPADM

## 2018-02-21 RX ORDER — LEVETIRACETAM 500 MG/1
500 TABLET ORAL EVERY 12 HOURS
Status: DISCONTINUED | OUTPATIENT
Start: 2018-02-21 | End: 2018-02-25

## 2018-02-21 RX ORDER — FOLIC ACID 1 MG/1
1 TABLET ORAL DAILY
Status: DISCONTINUED | OUTPATIENT
Start: 2018-02-21 | End: 2018-02-27 | Stop reason: HOSPADM

## 2018-02-21 RX ORDER — ONDANSETRON 2 MG/ML
4 INJECTION INTRAMUSCULAR; INTRAVENOUS EVERY 6 HOURS PRN
Status: DISCONTINUED | OUTPATIENT
Start: 2018-02-21 | End: 2018-02-27 | Stop reason: HOSPADM

## 2018-02-21 RX ORDER — BISACODYL 10 MG
10 SUPPOSITORY, RECTAL RECTAL ONCE
Status: COMPLETED | OUTPATIENT
Start: 2018-02-21 | End: 2018-02-21

## 2018-02-21 RX ORDER — HYDRALAZINE HYDROCHLORIDE 25 MG/1
100 TABLET, FILM COATED ORAL 3 TIMES DAILY
Status: DISCONTINUED | OUTPATIENT
Start: 2018-02-21 | End: 2018-02-27 | Stop reason: HOSPADM

## 2018-02-21 RX ORDER — HYDRALAZINE HYDROCHLORIDE 20 MG/ML
10 INJECTION INTRAMUSCULAR; INTRAVENOUS EVERY 6 HOURS PRN
Status: DISCONTINUED | OUTPATIENT
Start: 2018-02-21 | End: 2018-02-27 | Stop reason: HOSPADM

## 2018-02-21 RX ORDER — ALBUTEROL SULFATE 0.83 MG/ML
2.5 SOLUTION RESPIRATORY (INHALATION) EVERY 4 HOURS PRN
Status: DISCONTINUED | OUTPATIENT
Start: 2018-02-21 | End: 2018-02-21

## 2018-02-21 RX ORDER — CEFTRIAXONE 1 G/1
1 INJECTION, POWDER, FOR SOLUTION INTRAMUSCULAR; INTRAVENOUS
Status: COMPLETED | OUTPATIENT
Start: 2018-02-21 | End: 2018-02-21

## 2018-02-21 RX ORDER — ALBUTEROL SULFATE 0.83 MG/ML
2.5 SOLUTION RESPIRATORY (INHALATION)
Status: COMPLETED | OUTPATIENT
Start: 2018-02-21 | End: 2018-02-21

## 2018-02-21 RX ORDER — CEFTRIAXONE 1 G/1
1 INJECTION, POWDER, FOR SOLUTION INTRAMUSCULAR; INTRAVENOUS DAILY
Status: DISCONTINUED | OUTPATIENT
Start: 2018-02-22 | End: 2018-02-22

## 2018-02-21 RX ORDER — ASPIRIN 81 MG/1
81 TABLET ORAL DAILY
Status: DISCONTINUED | OUTPATIENT
Start: 2018-02-21 | End: 2018-02-27 | Stop reason: HOSPADM

## 2018-02-21 RX ADMIN — LEVETIRACETAM 500 MG: 500 TABLET ORAL at 09:02

## 2018-02-21 RX ADMIN — OSELTAMIVIR PHOSPHATE 30 MG: 6 POWDER, FOR SUSPENSION ORAL at 10:02

## 2018-02-21 RX ADMIN — HEPARIN SODIUM 5000 UNITS: 5000 INJECTION, SOLUTION INTRAVENOUS; SUBCUTANEOUS at 04:02

## 2018-02-21 RX ADMIN — ASPIRIN 81 MG: 81 TABLET, COATED ORAL at 03:02

## 2018-02-21 RX ADMIN — OSELTAMIVIR PHOSPHATE 30 MG: 6 POWDER, FOR SUSPENSION ORAL at 04:02

## 2018-02-21 RX ADMIN — CEFTRIAXONE SODIUM 1 G: 1 INJECTION, POWDER, FOR SOLUTION INTRAMUSCULAR; INTRAVENOUS at 12:02

## 2018-02-21 RX ADMIN — HYDRALAZINE HYDROCHLORIDE 100 MG: 25 TABLET, FILM COATED ORAL at 10:02

## 2018-02-21 RX ADMIN — FOLIC ACID 1 MG: 1 TABLET ORAL at 03:02

## 2018-02-21 RX ADMIN — FAMOTIDINE 20 MG: 20 TABLET, FILM COATED ORAL at 03:02

## 2018-02-21 RX ADMIN — ATORVASTATIN CALCIUM 40 MG: 20 TABLET, FILM COATED ORAL at 03:02

## 2018-02-21 RX ADMIN — LABETALOL HYDROCHLORIDE 200 MG: 200 TABLET, FILM COATED ORAL at 09:02

## 2018-02-21 RX ADMIN — AZITHROMYCIN MONOHYDRATE 500 MG: 500 INJECTION, POWDER, LYOPHILIZED, FOR SOLUTION INTRAVENOUS at 12:02

## 2018-02-21 RX ADMIN — AMLODIPINE BESYLATE 10 MG: 5 TABLET ORAL at 03:02

## 2018-02-21 RX ADMIN — Medication 1000 MG: at 02:02

## 2018-02-21 RX ADMIN — ALBUTEROL SULFATE 2.5 MG: 2.5 SOLUTION RESPIRATORY (INHALATION) at 07:02

## 2018-02-21 RX ADMIN — FUROSEMIDE 80 MG: 10 INJECTION, SOLUTION INTRAVENOUS at 03:02

## 2018-02-21 RX ADMIN — ALBUTEROL SULFATE 2.5 MG: 2.5 SOLUTION RESPIRATORY (INHALATION) at 01:02

## 2018-02-21 RX ADMIN — HYDRALAZINE HYDROCHLORIDE 100 MG: 25 TABLET, FILM COATED ORAL at 03:02

## 2018-02-21 RX ADMIN — ALBUTEROL SULFATE 2.5 MG: 2.5 SOLUTION RESPIRATORY (INHALATION) at 03:02

## 2018-02-21 RX ADMIN — METHYLPREDNISOLONE SODIUM SUCCINATE 40 MG: 40 INJECTION, POWDER, FOR SOLUTION INTRAMUSCULAR; INTRAVENOUS at 03:02

## 2018-02-21 RX ADMIN — HEPARIN SODIUM 5000 UNITS: 5000 INJECTION, SOLUTION INTRAVENOUS; SUBCUTANEOUS at 09:02

## 2018-02-21 RX ADMIN — BISACODYL 10 MG: 10 SUPPOSITORY RECTAL at 04:02

## 2018-02-21 NOTE — SUBJECTIVE & OBJECTIVE
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    Cortical cataract of both eyes 7/26/2016    Degeneration of lumbar or lumbosacral intervertebral disc 3/5/2013    S/p MRI L-spine 5/2009     Depression     General anesthetics causing adverse effect in therapeutic use     Hemorrhagic cerebrovascular accident (CVA)     8/2016 s/p Hemicraniotomy at [facility] with Left hemiparesis    Hemorrhagic cerebrovascular accident (CVA) 1/3/2018    History of stroke 6/28/2017    s/p R-MCA stroke with R-putaminal hemorrhagic transformation in 8/2016 and 11/2016 (s/p hemicraniotomy at [facility]) with residual L hemiparesis, on AED s/p CVA      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    Hyperlipidemia     Hypertensive retinopathy of both eyes 7/26/2016    Impingement syndrome of right shoulder 7/2/2014    Left ventricular diastolic dysfunction with preserved systolic function 12/15/2015    Mixed anxiety and depressive disorder 3/5/2013    Obesity     THERESA (obstructive sleep apnea) 3/5/2013    No Home CPAP 2ndary to cost     Partial symptomatic epilepsy with complex partial seizures, not intractable, without status epilepticus     PEG (percutaneous endoscopic gastrostomy) status 6/28/2017    Renovascular hypertension 3/5/2013    Will resume home medications: amlodipine, labetalol, and hydralazine Will hold Lisinopril in setting of DARLING.    Rheumatoid arthritis(714.0)     Rotator cuff tear 7/2/2014    S/P breast biopsy, left 3/5/2013    Benign Breast Bx     S/P R shoulder surgery, SAD, DCE, biceps tenotomy 7/15/2014    S/P total hysterectomy 7/10/2013    Sarcoidosis     Stroke 2016    left sided flaccidity, SAH    Type 2 diabetes  "mellitus with both eyes affected by moderate nonproliferative retinopathy without macular edema, without long-term current use of insulin 8/2/2017    Type 2 diabetes mellitus with diabetic polyneuropathy, without long-term current use of insulin 10/22/2015    Type 2 diabetes mellitus with stage 3 chronic kidney disease, without long-term current use of insulin 10/22/2015    Vertebral artery stenosis 3/5/2013    S/p Stenting per Dr Burnett        Past Surgical History:   Procedure Laterality Date    BREAST SURGERY      breast reduction    COLONOSCOPY N/A 8/11/2016    Procedure: COLONOSCOPY;  Surgeon: Jerry Vilchis MD;  Location: [facility] ([address] [place] [address]);  Service: Endoscopy;  Laterality: N/A;  Patient reports difficulty awaking from anesthesia in the past.    HYSTERECTOMY      ROTATOR CUFF REPAIR Right July 9, 2014    right side    Skull surgery      Aneurysm    stent placed      in vertebral artery    TUBAL LIGATION         Review of patient's allergies indicates:   Allergen Reactions    Lisinopril Other (See Comments)     Angioedema      Vicodin [hydrocodone-acetaminophen] Rash       Current Facility-Administered Medications on File Prior to Encounter   Medication    onabotulinumtoxina injection 300 Units     Current Outpatient Prescriptions on File Prior to Encounter   Medication Sig    albuterol (PROVENTIL) 2.5 mg /3 mL (0.083 %) nebulizer solution Take 3 mLs (2.5 mg total) by nebulization every 6 (six) hours as needed for Wheezing. Rescue    amlodipine (NORVASC) 10 MG tablet Take 1 tablet (10 mg total) by mouth once daily.    aspirin (ECOTRIN) 81 MG EC tablet Take 81 mg by mouth once daily.      atorvastatin (LIPITOR) 40 MG tablet TAKE 1 TABLET ONE TIME DAILY FOR CHOLESTEROL    BD INSULIN PEN NEEDLE UF SHORT 31 gauge x 5/16" Ndle USE TO INJECT NOVOLOG FLEXPEN BEFORE MEALS    blood sugar diagnostic (ACCU-CHEK SMARTVIEW TEST STRIP) Strp 1 strip by Misc.(Non-Drug; Combo Route) route 2 (two) times " daily.    dantrolene (DANTRIUM) 25 MG Cap 3 caps 3x/day    famotidine (PEPCID) 20 MG tablet TAKE 1 TABLET (20 MG TOTAL) BY MOUTH ONCE DAILY.    ferrous sulfate 220 mg (44 mg iron)/5 mL solution 10ml daily for Iron/Give via PEG    hydrALAZINE (APRESOLINE) 100 MG tablet Take 1 tablet (100 mg total) by mouth 3 (three) times daily.    hydroCHLOROthiazide (HYDRODIURIL) 25 MG tablet Take 1 tablet (25 mg total) by mouth once daily.    labetalol (NORMODYNE) 100 MG tablet Take 5 tablets (500 mg total) by mouth every 8 (eight) hours.    levetiracetam (KEPPRA) 500 MG Tab Take 1 tablet (500 mg total) by mouth every 8 (eight) hours.    multivitamin with iron Tab 1/day (Patient taking differently: Take 1 tablet by mouth once daily. )    pregabalin (LYRICA) 25 MG capsule 1 cap in AM and 2 caps at Bedtime for muscle spasm    [DISCONTINUED] colchicine 0.6 mg tablet 1 tab daily as needed for gout flare    folic acid (FOLVITE) 1 MG tablet Take 1 tablet (1 mg total) by mouth once daily.    [DISCONTINUED] acetaminophen-codeine 300-60mg (TYLENOL #4) 300-60 mg Tab 1 tab Q8-12 hours as needed for severe pain    [DISCONTINUED] ALPRAZolam (XANAX) 0.5 MG tablet 1/2 to 1 tab Q8 hours as needed for anxiety    [DISCONTINUED] clotrimazole-betamethasone 1-0.05% (LOTRISONE) cream Apply topically 2 (two) times daily. Apply to area of rash/iting on buttocks 1-2x/day as needed    [DISCONTINUED] fluoxetine 20 MG tablet Take 1 tablet (20 mg total) by mouth once daily.    [DISCONTINUED] insulin detemir (LEVEMIR FLEXTOUCH) 100 unit/mL (3 mL) SubQ InPn pen 14 units in AM (Patient taking differently: Inject 14 Units into the skin every evening. )    [DISCONTINUED] nut.tx.gluc.intol,lac-free,soy (GLUCERNA 1.5 NOAH) Liqd 1.5 cans in AM, 1 can at Noon , 1.5cans at Dinner, and 1 can before Bed/Total 5 cans daily    [DISCONTINUED] nystatin (MYCOSTATIN) powder Apply topically 2 (two) times daily. Apply to irritated skin 2x/day as needed      Family History     Problem Relation (Age of Onset)    Bell's palsy Sister    Blindness Maternal Grandmother    Cancer Mother (55)    Diabetes Mother,     Heart attack Mother    Heart disease Mother    Hypertension Mother, Sister    Lupus Sister    No Known Problems Daughter, Son    Sleep apnea Sister    Stroke Sister, Maternal Aunt        Social History Main Topics    Smoking status: Never Smoker    Smokeless tobacco: Never Used    Alcohol use No      Comment: occasional wine cooler     Drug use: No    Sexual activity: Yes     Partners: Male     Review of Systems   Constitutional: Negative for appetite change and fever.   Eyes: Negative for redness and visual disturbance.   Respiratory: Negative for cough and shortness of breath.    Cardiovascular: Negative for chest pain and leg swelling.   Gastrointestinal: Negative for abdominal pain, nausea and vomiting.   Endocrine: Negative for cold intolerance and heat intolerance.   Genitourinary: Negative for dysuria and menstrual problem.   Musculoskeletal: Negative for gait problem and neck pain.   Skin: Negative.    Allergic/Immunologic: Negative for environmental allergies and food allergies.   Neurological: Positive for weakness. Negative for syncope.        Left hemiparesis     Hematological: Negative for adenopathy.   Psychiatric/Behavioral: Negative for agitation and confusion.     Objective:     Vital Signs (Most Recent):  Temp: 97.4 °F (36.3 °C) (02/21/18 0906)  Pulse: 68 (02/21/18 1300)  Resp: 19 (02/21/18 1300)  BP: (!) 150/71 (02/21/18 1300)  SpO2: 96 % (02/21/18 1300) Vital Signs (24h Range):  Temp:  [97.4 °F (36.3 °C)] 97.4 °F (36.3 °C)  Pulse:  [65-69] 68  Resp:  [19-30] 19  SpO2:  [88 %-97 %] 96 %  BP: (150-182)/(69-79) 150/71     Weight: 72.6 kg (160 lb)  Body mass index is 28.34 kg/m².    Physical Exam   Constitutional: She is oriented to person, place, and time. She appears well-developed and well-nourished.   HENT:   Head: Normocephalic and  atraumatic.   Eyes: Conjunctivae are normal. Pupils are equal, round, and reactive to light.   Neck: Normal range of motion. Neck supple.   Cardiovascular: Normal rate, regular rhythm and normal heart sounds.    Pulmonary/Chest: Effort normal. She has wheezes.   On BiPap   Abdominal: Soft. Bowel sounds are normal. She exhibits no mass. There is no guarding.   Musculoskeletal: Normal range of motion. She exhibits edema.   LLE edema 2+. No RLL edema.    Neurological: She is alert and oriented to person, place, and time.   Skin: Skin is warm and dry.   Psychiatric: She has a normal mood and affect. Her behavior is normal.         CRANIAL NERVES     CN III, IV, VI   Pupils are equal, round, and reactive to light.       Significant Labs:   ABGs:   Recent Labs  Lab 02/21/18  1223   PH 7.293*   PCO2 35.0   HCO3 17.0*   POCSATURATED 94*   BE -10     CBC:   Recent Labs  Lab 02/21/18  0942   WBC 5.71   HGB 7.7*   HCT 25.1*   *     CMP:   Recent Labs  Lab 02/21/18  0942   *   K 4.3   *   CO2 13*   *   BUN 62*   CREATININE 2.1*   CALCIUM 9.2   PROT 7.2   ALBUMIN 3.8   BILITOT 0.5   ALKPHOS 86   AST 12   ALT 11   ANIONGAP 13   EGFRNONAA 25*     Cardiac Markers:   Recent Labs  Lab 02/21/18  0942   *       Significant Imaging: I have reviewed all pertinent imaging results/findings within the past 24 hours.   Imaging Results          US Lower Extremity Veins Left (In process)                X-Ray Chest 1 View (Final result)  Result time 02/21/18 10:24:29    Final result by Donny Joseph MD (02/21/18 10:24:29)                 Impression:      Interval development of moderate right bilateral pulmonary consolidation predominantly in a central and basilar distribution suggestive of moderate pulmonary edema.  Pneumonia is considered less likely.  Poor visualization of the left hemidiaphragm and left costophrenic angle.  A small layering left-sided pleural effusion is suspected.  Prominence of the  cardiac silhouette, stable.      Electronically signed by: SPEEDY FREEMAN MD  Date:     02/21/18  Time:    10:24              Narrative:    Comparison is made to prior examination dated 2/15/18.    A single AP radiograph of the chest was obtained.  The cardiac silhouette is prominent in size as before.  Atherosclerotic calcification is present within the aortic arch.  There is bilateral pulmonary consolidation identified predominantly within the mid to lower lungs more pronounced on the right than on the left.  Findings are suggestive of moderate pulmonary edema and this represents a detrimental change when compared to the prior examination.  Pneumonia is considered less likely but cannot be excluded.  There is poor visualization of the left hemidiaphragm.  A small layering left-sided pleural effusion is suspected.  There is no evidence for pneumothorax.  Bony structures are grossly intact.

## 2018-02-21 NOTE — ED NOTES
The patient is resting,  at bedside, blood cultures to be drawn, she is tolerating BiPAP on 30 percent FiO2. No acute distress, will continue to monitor.

## 2018-02-21 NOTE — H&P
[facility] Medicine  History & Physical    Patient Name: Lucy Perez  MRN: [id number]  Admission Date: 2018  Attending Physician: Tommy Evans MD   Primary Care Provider: Beverly Muniz MD         Patient information was obtained from patient, spouse/SO and ER records.     Subjective:     Principal Problem:Acute respiratory failure with hypoxia    Chief Complaint:   Chief Complaint   Patient presents with    Shortness of Breath     SOB x 1 wk. seen last week when first started. hx of stroke in 2016 affecting swallowing ability.         HPI: 58 yo female with PMH CVA secondary to bleed with residual left hemiparesis, sarcoidosis ( and patient seem unsure of this diagnosis), asthma, HTN, DM-2, was in her usual state of health at home until about 7 days ago when she began to experience progressive symptoms of SOB, wheezing, no cough, no chest pain, no fever.  No NVD, but reports constipation last BM 3 days ago.  Feels a little better on BiPap.       AB.29 / 35 / 79 on BiPap 10/5 30%.   CXR shows new opacity on right more so versus previous, and reports suggest edema.  BNP is 546.        Past Medical History:   Diagnosis Date    Anemia in stage 3 chronic kidney disease 2017    Anemia of chronic disease 6/10/2017    Anxiety     Arthritis of right acromioclavicular joint 2014    Asthma     Bipolar disorder     Bronchitis, acute     Cataract     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    Cortical cataract of both eyes 2016    Degeneration of lumbar or lumbosacral intervertebral disc 3/5/2013    S/p MRI L-spine 2009     Depression     General anesthetics causing adverse effect in therapeutic use     Hemorrhagic cerebrovascular accident (CVA)     [place] [facility] [place]    Hemorrhagic cerebrovascular accident (CVA) 1/3/2018    History of stroke 2017    s/p  R-MCA stroke with R-putaminal hemorrhagic transformation in 8/2016 and 11/2016 ([place] [facility] [place] with residual L hemiparesis, on AED s/p CVA      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    Hyperlipidemia     Hypertensive retinopathy of both eyes 7/26/2016    Impingement syndrome of right shoulder 7/2/2014    Left ventricular diastolic dysfunction with preserved systolic function 12/15/2015    Mixed anxiety and depressive disorder 3/5/2013    Obesity     THERESA (obstructive sleep apnea) 3/5/2013    No Home CPAP 2ndary to cost     Partial symptomatic epilepsy with complex partial seizures, not intractable, without status epilepticus     PEG (percutaneous endoscopic gastrostomy) status 6/28/2017    Renovascular hypertension 3/5/2013    Will resume home medications: amlodipine, labetalol, and hydralazine Will hold Lisinopril in setting of DARLING.   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 without macular edema, without long-term current use of insulin 8/2/2017    Type 2 diabetes mellitus with diabetic polyneuropathy, without long-term current use of insulin 10/22/2015    Type 2 diabetes mellitus with stage 3 chronic kidney disease, without long-term current use of insulin 10/22/2015    Vertebral artery stenosis 3/5/2013    S/p Stenting per Dr Burnett        Past Surgical History:   Procedure Laterality Date    BREAST SURGERY      breast reduction    COLONOSCOPY N/A 8/11/2016    Procedure: COLONOSCOPY;  Surgeon: Jerry Vilchis MD;  Location: [facility] ([address] [place] [address]);  Service: Endoscopy;  Laterality: N/A;  Patient reports difficulty awaking from anesthesia in the past.    HYSTERECTOMY    "   ROTATOR CUFF REPAIR Right July 9, 2014    right side    Skull surgery      Aneurysm    stent placed      in vertebral artery    TUBAL LIGATION         Review of patient's allergies indicates:   Allergen Reactions    Lisinopril Other (See Comments)     Angioedema      Vicodin [hydrocodone-acetaminophen] Rash       Current Facility-Administered Medications on File Prior to Encounter   Medication    onabotulinumtoxina injection 300 Units     Current Outpatient Prescriptions on File Prior to Encounter   Medication Sig    albuterol (PROVENTIL) 2.5 mg /3 mL (0.083 %) nebulizer solution Take 3 mLs (2.5 mg total) by nebulization every 6 (six) hours as needed for Wheezing. Rescue    amlodipine (NORVASC) 10 MG tablet Take 1 tablet (10 mg total) by mouth once daily.    aspirin (ECOTRIN) 81 MG EC tablet Take 81 mg by mouth once daily.      atorvastatin (LIPITOR) 40 MG tablet TAKE 1 TABLET ONE TIME DAILY FOR CHOLESTEROL    BD INSULIN PEN NEEDLE UF SHORT 31 gauge x 5/16" Ndle USE TO INJECT NOVOLOG FLEXPEN BEFORE MEALS    blood sugar diagnostic (ACCU-CHEK SMARTVIEW TEST STRIP) Strp 1 strip by Misc.(Non-Drug; Combo Route) route 2 (two) times daily.    dantrolene (DANTRIUM) 25 MG Cap 3 caps 3x/day    famotidine (PEPCID) 20 MG tablet TAKE 1 TABLET (20 MG TOTAL) BY MOUTH ONCE DAILY.    ferrous sulfate 220 mg (44 mg iron)/5 mL solution 10ml daily for Iron/Give via PEG    hydrALAZINE (APRESOLINE) 100 MG tablet Take 1 tablet (100 mg total) by mouth 3 (three) times daily.    hydroCHLOROthiazide (HYDRODIURIL) 25 MG tablet Take 1 tablet (25 mg total) by mouth once daily.    labetalol (NORMODYNE) 100 MG tablet Take 5 tablets (500 mg total) by mouth every 8 (eight) hours.    levetiracetam (KEPPRA) 500 MG Tab Take 1 tablet (500 mg total) by mouth every 8 (eight) hours.    multivitamin with iron Tab 1/day (Patient taking differently: Take 1 tablet by mouth once daily. )    pregabalin (LYRICA) 25 MG capsule 1 cap in AM " and 2 caps at Bedtime for muscle spasm    [DISCONTINUED] colchicine 0.6 mg tablet 1 tab daily as needed for gout flare    folic acid (FOLVITE) 1 MG tablet Take 1 tablet (1 mg total) by mouth once daily.    [DISCONTINUED] acetaminophen-codeine 300-60mg (TYLENOL #4) 300-60 mg Tab 1 tab Q8-12 hours as needed for severe pain    [DISCONTINUED] ALPRAZolam (XANAX) 0.5 MG tablet 1/2 to 1 tab Q8 hours as needed for anxiety    [DISCONTINUED] clotrimazole-betamethasone 1-0.05% (LOTRISONE) cream Apply topically 2 (two) times daily. Apply to area of rash/iting on buttocks 1-2x/day as needed    [DISCONTINUED] fluoxetine 20 MG tablet Take 1 tablet (20 mg total) by mouth once daily.    [DISCONTINUED] insulin detemir (LEVEMIR FLEXTOUCH) 100 unit/mL (3 mL) SubQ InPn pen 14 units in AM (Patient taking differently: Inject 14 Units into the skin every evening. )    [DISCONTINUED] nut.tx.gluc.intol,lac-free,soy (GLUCERNA 1.5 NOAH) Liqd 1.5 cans in AM, 1 can at Noon , 1.5cans at Dinner, and 1 can before Bed/Total 5 cans daily    [DISCONTINUED] nystatin (MYCOSTATIN) powder Apply topically 2 (two) times daily. Apply to irritated skin 2x/day as needed     Family History     Problem Relation (Age of Onset)    Bell's palsy Sister    Blindness Maternal Grandmother    Cancer Mother (55)    Diabetes Mother,     Heart attack Mother    Heart disease Mother    Hypertension Mother, Sister    Lupus Sister    No Known Problems Daughter, Son    Sleep apnea Sister    Stroke Sister, Maternal Aunt        Social History Main Topics    Smoking status: Never Smoker    Smokeless tobacco: Never Used    Alcohol use No      Comment: occasional wine cooler     Drug use: No    Sexual activity: Yes     Partners: Male     Review of Systems   Constitutional: Negative for appetite change and fever.   Eyes: Negative for redness and visual disturbance.   Respiratory: Negative for cough and shortness of breath.    Cardiovascular: Negative for chest pain and  leg swelling.   Gastrointestinal: Negative for abdominal pain, nausea and vomiting.   Endocrine: Negative for cold intolerance and heat intolerance.   Genitourinary: Negative for dysuria and menstrual problem.   Musculoskeletal: Negative for gait problem and neck pain.   Skin: Negative.    Allergic/Immunologic: Negative for environmental allergies and food allergies.   Neurological: Positive for weakness. Negative for syncope.        Left hemiparesis     Hematological: Negative for adenopathy.   Psychiatric/Behavioral: Negative for agitation and confusion.     Objective:     Vital Signs (Most Recent):  Temp: 97.4 °F (36.3 °C) (02/21/18 0906)  Pulse: 68 (02/21/18 1300)  Resp: 19 (02/21/18 1300)  BP: (!) 150/71 (02/21/18 1300)  SpO2: 96 % (02/21/18 1300) Vital Signs (24h Range):  Temp:  [97.4 °F (36.3 °C)] 97.4 °F (36.3 °C)  Pulse:  [65-69] 68  Resp:  [19-30] 19  SpO2:  [88 %-97 %] 96 %  BP: (150-182)/(69-79) 150/71     Weight: 72.6 kg (160 lb)  Body mass index is 28.34 kg/m².    Physical Exam   Constitutional: She is oriented to person, place, and time. She appears well-developed and well-nourished.   HENT:   Head: Normocephalic and atraumatic.   Eyes: Conjunctivae are normal. Pupils are equal, round, and reactive to light.   Neck: Normal range of motion. Neck supple.   Cardiovascular: Normal rate, regular rhythm and normal heart sounds.    Pulmonary/Chest: Effort normal. She has wheezes.   On BiPap   Abdominal: Soft. Bowel sounds are normal. She exhibits no mass. There is no guarding.   Musculoskeletal: Normal range of motion. She exhibits edema.   LLE edema 2+. No RLL edema.    Neurological: She is alert and oriented to person, place, and time.   Skin: Skin is warm and dry.   Psychiatric: She has a normal mood and affect. Her behavior is normal.         CRANIAL NERVES     CN III, IV, VI   Pupils are equal, round, and reactive to light.       Significant Labs:   ABGs:   Recent Labs  Lab 02/21/18  1223   PH 7.293*    PCO2 35.0   HCO3 17.0*   POCSATURATED 94*   BE -10     CBC:   Recent Labs  Lab 02/21/18  0942   WBC 5.71   HGB 7.7*   HCT 25.1*   *     CMP:   Recent Labs  Lab 02/21/18  0942   *   K 4.3   *   CO2 13*   *   BUN 62*   CREATININE 2.1*   CALCIUM 9.2   PROT 7.2   ALBUMIN 3.8   BILITOT 0.5   ALKPHOS 86   AST 12   ALT 11   ANIONGAP 13   EGFRNONAA 25*     Cardiac Markers:   Recent Labs  Lab 02/21/18  0942   *       Significant Imaging: I have reviewed all pertinent imaging results/findings within the past 24 hours.   Imaging Results          US Lower Extremity Veins Left (In process)                X-Ray Chest 1 View (Final result)  Result time 02/21/18 10:24:29    Final result by Donny Joseph MD (02/21/18 10:24:29)                 Impression:      Interval development of moderate right bilateral pulmonary consolidation predominantly in a central and basilar distribution suggestive of moderate pulmonary edema.  Pneumonia is considered less likely.  Poor visualization of the left hemidiaphragm and left costophrenic angle.  A small layering left-sided pleural effusion is suspected.  Prominence of the cardiac silhouette, stable.      Electronically signed by: DONNY JOSEPH MD  Date:     02/21/18  Time:    10:24              Narrative:    Comparison is made to prior examination dated 2/15/18.    A single AP radiograph of the chest was obtained.  The cardiac silhouette is prominent in size as before.  Atherosclerotic calcification is present within the aortic arch.  There is bilateral pulmonary consolidation identified predominantly within the mid to lower lungs more pronounced on the right than on the left.  Findings are suggestive of moderate pulmonary edema and this represents a detrimental change when compared to the prior examination.  Pneumonia is considered less likely but cannot be excluded.  There is poor visualization of the left hemidiaphragm.  A small layering left-sided  pleural effusion is suspected.  There is no evidence for pneumothorax.  Bony structures are grossly intact.                            Assessment/Plan:     * Acute respiratory failure with hypoxia    -  states patient uses only 2 L NC oxygen at night PRN, but not always.   - History of sarcoid, but  and patient didn't seem definitely aware of this.  - She does have some wheezing, but fair air movement.   - Some JVD.    - ABG consistent with hypoxia on BiPap  - CHF consideration given CXR but also would consider infectious process.   - BNP elevated, but patient with HTN and advanced CKD.   - Check 2-D ECHO.  - Flu swab.  Blood cultures pending, although at this point doesn't appear definitely infected, WBC WNL.    - Continue antibiotics.    - PCC consulted.  Discussed with Dr. Belle.             Pneumonia due to infectious organism    - Got Vanc, Rocephin, Zithromax in ED.   - Continue Rocephin Zithromax empirically for now.   - Empiric Tamiflu.  - Blood cultures pending.  Fluy swab negative.                  Edema of left lower extremity    -  thinks appears more swollen.   - LLE US             Wheezing    - Nebs  - Give one dose Solumedrol            Shortness of breath    - Treat underlying            Anemia in stage 3 chronic kidney disease    - Noted            History of stroke    - Residual left hemiparesis, husbands states patient uses wheelchair.   - Discussed with  importance of turning patient, and he expressed understanding.   - PT OT            PEG (percutaneous endoscopic gastrostomy) status    -  states is used for meds and patient currently eats solid food as recovered from CVA.              Constipation    - Bisacodyl supp            Stage 3 chronic kidney disease    - Monitor            Hyperlipidemia    - Atorvastatin 40          Renovascular hypertension    - Continue home medications and monitor.   - hydralazine PRN.                 VTE Risk Mitigation          Ordered     heparin (porcine) injection 5,000 Units  Every 8 hours     Route:  Subcutaneous        02/21/18 1252             Tommy Evans MD  Department of Hospital Medicine   [facility]

## 2018-02-21 NOTE — TELEPHONE ENCOUNTER
----- Message from Salma Ivory sent at 2/21/2018 10:23 AM CST -----  Contact: cecil daughter [phone]  Pt's daughter called to advise you that her mother won't be coming to the lab today because she is in the ER with c/o being short of breath.

## 2018-02-21 NOTE — PROGRESS NOTES
Pt received from ED on 4LNC;SPO2 96%. BIPAP on standby at bedside.Treatment given and tolerated well. No other changes made. Will continue to monitor.

## 2018-02-21 NOTE — ED NOTES
Dr. Evans and pulmonary MD at bedside to examine the patient. She is resting, tolerating BiPAP well. Will continue to monitor.

## 2018-02-21 NOTE — HPI
56 yo female with PMH CVA secondary to bleed with residual left hemiparesis, sarcoidosis ( and patient seem unsure of this diagnosis), asthma, HTN, DM-2, was in her usual state of health at home until about 7 days ago when she began to experience progressive symptoms of SOB, wheezing, no cough, no chest pain, no fever.  No NVD, but reports constipation last BM 3 days ago.  Feels a little better on BiPap.       AB.29 / 35 / 79 on BiPap 10/5 30%.   CXR shows new opacity on right more so versus previous, and reports suggest edema.  BNP is 546.

## 2018-02-21 NOTE — CONSULTS
Consult Note  Pulmonary & Critical Care Medicine    SUBJECTIVE     Reason for consult: hypoxia    History of Present Illness  PCCM is consulted for hypoxia in this 57-yo female with PMHx including diastolic dysfunction, asthma, THERESA, and pulmonary sarcoidosis who was admitted via ED today. Pt reportedly had high work of breathing and was started on BiPAP in the ED. She says this has helped a lot. Pt and  explain that she is not usually SOB at baseline. She first noticed her SOB about a week ago, and it has steadily increased since then, accompanied by wheezing. The only other new problems she identifies are generalized swelling in her left leg and pain in the front of her left knee. She denies recent cold or flu, cough, sputum, hemoptysis, n/v/d. She has had a runny nose and worse-than-usual constipation. [place] [facility] says they gave her a breathing treatment, then discharged her home without new medications. Pt is a lifelong non-smoker who says she was diagnosed with sarcoidosis by lung biopsy in the late '90s. She has been [place] [facility], but says she has not had problems warranting regular visits in the last couple years. In August 2016, she had a hemorrhagic stroke related to a brain aneurysm. She underwent craniectomy and eventual PEG placement at [facility]. Residual deficits include left hemiparesis. She is able to speak normally at baseline, and can swallow her food but not always pills, for which she still uses the PEG. She had seizures in 2017 and was put on Keppra without recurrence.       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    Cortical cataract of  both eyes 7/26/2016    Degeneration of lumbar or lumbosacral intervertebral disc 3/5/2013    S/p MRI L-spine 5/2009     Depression     General anesthetics causing adverse effect in therapeutic use     Hemorrhagic cerebrovascular accident (CVA)     [place] [facility] [place]    Hemorrhagic cerebrovascular accident (CVA) 1/3/2018    History of stroke 6/28/2017    s/p R-MCA stroke with R-putaminal hemorrhagic transformation in 8/2016 and 11/2016 [place] [facility] [place] residual L hemiparesis, on AED s/p CVA      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    Hyperlipidemia     Hypertensive retinopathy of both eyes 7/26/2016    Impingement syndrome of right shoulder 7/2/2014    Left ventricular diastolic dysfunction with preserved systolic function 12/15/2015    Mixed anxiety and depressive disorder 3/5/2013    Obesity     THERESA (obstructive sleep apnea) 3/5/2013    No Home CPAP 2ndary to cost     Partial symptomatic epilepsy with complex partial seizures, not intractable, without status epilepticus     PEG (percutaneous endoscopic gastrostomy) status 6/28/2017    Renovascular hypertension 3/5/2013    Will resume home medications: amlodipine, labetalol, and hydralazine Will hold Lisinopril in setting of DARLING.   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 without macular edema, without long-term current use of insulin 8/2/2017    Type 2 diabetes mellitus with diabetic polyneuropathy, without long-term current use of insulin 10/22/2015    Type 2 diabetes mellitus with stage 3 chronic kidney disease, without long-term current use of insulin 10/22/2015     "Vertebral artery stenosis 3/5/2013    S/p Stenting per Dr Burnett        Past Surgical History:   Procedure Laterality Date    BREAST SURGERY      breast reduction    COLONOSCOPY N/A 8/11/2016    Procedure: COLONOSCOPY;  Surgeon: Jerry Vilchis MD;  Location: [facility] ([address] [place] [address]);  Service: Endoscopy;  Laterality: N/A;  Patient reports difficulty awaking from anesthesia in the past.    HYSTERECTOMY      ROTATOR CUFF REPAIR Right July 9, 2014    right side    Skull surgery      Aneurysm    stent placed      in vertebral artery    TUBAL LIGATION         Family History   Problem Relation Age of Onset    Cancer Mother 55     Breast cancer    Diabetes Mother     Hypertension Mother     Heart disease Mother     Heart attack Mother     Stroke Sister     Hypertension Sister     Sleep apnea Sister     No Known Problems Daughter     No Known Problems Son     Bell's palsy Sister     Lupus Sister     Blindness Maternal Grandmother     Diabetes       "My entire family family has diabetes"    Stroke Maternal Aunt     Amblyopia Neg Hx     Cataracts Neg Hx     Glaucoma Neg Hx     Macular degeneration Neg Hx     Retinal detachment Neg Hx     Strabismus Neg Hx        Social History     Social History    Marital status:      Spouse name: N/A    Number of children: N/A    Years of education: N/A     Social History Main Topics    Smoking status: Never Smoker    Smokeless tobacco: Never Used    Alcohol use No      Comment: occasional wine cooler     Drug use: No    Sexual activity: Yes     Partners: Male     Other Topics Concern    Not on file     Social History Narrative    . 2 children. Does not work. Previously worked as a .        Current Facility-Administered Medications   Medication Dose Route Frequency Provider Last Rate Last Dose    albuterol nebulizer solution 2.5 mg  2.5 mg Nebulization Q4H WAKE Dio Belle MD        azithromycin 500 mg in dextrose 5 % " "250 mL IVPB (ready to mix system)  500 mg Intravenous ED 1 Time Marilynn Morris  mL/hr at 02/21/18 1249 500 mg at 02/21/18 1249    furosemide injection 80 mg  80 mg Intravenous Once Dio Belle MD        heparin (porcine) injection 5,000 Units  5,000 Units Subcutaneous Q8H Tommy Evans MD        hydrALAZINE injection 10 mg  10 mg Intravenous Q6H PRN Tommy Evans MD        methylPREDNISolone sodium succinate injection 40 mg  40 mg Intravenous Once Tommy Evans MD        onabotulinumtoxina injection 300 Units  300 Units Intramuscular 1 time in Clinic/HOD Monty Pardo MD        ondansetron injection 4 mg  4 mg Intravenous Q6H PRN Tommy Evans MD        oseltamivir 6 mg/mL 30 mg  30 mg Oral BID Dio Belle MD        vancomycin 1 g in dextrose 5 % 250 mL IVPB (ready to mix system)  1,000 mg Intravenous ED 1 Time Marilynn Morris MD         Current Outpatient Prescriptions   Medication Sig Dispense Refill    albuterol (PROVENTIL) 2.5 mg /3 mL (0.083 %) nebulizer solution Take 3 mLs (2.5 mg total) by nebulization every 6 (six) hours as needed for Wheezing. Rescue 25 each 3    amlodipine (NORVASC) 10 MG tablet Take 1 tablet (10 mg total) by mouth once daily. 90 tablet 2    aspirin (ECOTRIN) 81 MG EC tablet Take 81 mg by mouth once daily.        atorvastatin (LIPITOR) 40 MG tablet TAKE 1 TABLET ONE TIME DAILY FOR CHOLESTEROL 90 tablet 2    BD INSULIN PEN NEEDLE UF SHORT 31 gauge x 5/16" Ndle USE TO INJECT NOVOLOG FLEXPEN BEFORE MEALS 150 each 11    blood sugar diagnostic (ACCU-CHEK SMARTVIEW TEST STRIP) Strp 1 strip by Misc.(Non-Drug; Combo Route) route 2 (two) times daily. 300 each 3    dantrolene (DANTRIUM) 25 MG Cap 3 caps 3x/day 180 capsule 0    famotidine (PEPCID) 20 MG tablet TAKE 1 TABLET (20 MG TOTAL) BY MOUTH ONCE DAILY. 90 tablet 3    ferrous sulfate 220 mg (44 mg iron)/5 mL solution 10ml daily for Iron/Give via  mL 6    hydrALAZINE (APRESOLINE) 100 MG tablet Take " 1 tablet (100 mg total) by mouth 3 (three) times daily. 270 tablet 3    hydroCHLOROthiazide (HYDRODIURIL) 25 MG tablet Take 1 tablet (25 mg total) by mouth once daily. 30 tablet 3    labetalol (NORMODYNE) 100 MG tablet Take 5 tablets (500 mg total) by mouth every 8 (eight) hours. 450 tablet 6    levetiracetam (KEPPRA) 500 MG Tab Take 1 tablet (500 mg total) by mouth every 8 (eight) hours. 90 tablet 0    multivitamin with iron Tab 1/day (Patient taking differently: Take 1 tablet by mouth once daily. ) 30 each prn    pregabalin (LYRICA) 25 MG capsule 1 cap in AM and 2 caps at Bedtime for muscle spasm 90 capsule 6    folic acid (FOLVITE) 1 MG tablet Take 1 tablet (1 mg total) by mouth once daily. 90 tablet 3       Review of patient's allergies indicates:   Allergen Reactions    Lisinopril Other (See Comments)     Angioedema      Vicodin [hydrocodone-acetaminophen] Rash       Review of Systems  As above. Additionally:   Constitutional: Negative for fever, diaphoresis, activity change, appetite change and fatigue.   HENT: Negative for congestion, postnasal drip, rhinorrhea, sinus pressure, sore throat, and voice change.    Eyes: Negative for photophobia, pain and visual disturbance.   Respiratory: Negative for cough.    Cardiovascular: Negative for chest pain and palpitations.   Gastrointestinal: Negative for nausea, vomiting, abdominal pain, diarrhea, and abdominal distention.   Endocrine: Negative for polydipsia, polyphagia and polyuria.   Genitourinary: Negative for dysuria, frequency, hematuria and difficulty urinating.    Skin: Negative for ulcer, color change, pallor, rash and wound.   Neurological: Negative for dizziness, seizures, syncope, weakness and headaches.   Hematological: Negative for adenopathy. Does not bruise/bleed easily.   Psychiatric/Behavioral: Negative for sleep disturbance and dysphoric mood. The patient is not nervous/anxious.          OBJECTIVE     Vitals:    02/21/18 1330   BP:     Pulse: 65   Resp: (!) 22   Temp:        Physical Exam  Constitutional: Patient is oriented to person, place, and time. Patient appears obese and chronically ill. No distress.   HENT: Head: Normocephalic. Defect over right temporal area. Right Ear: External ear normal. Left Ear: External ear normal. Nose: Nose normal.   Mouth/Throat: Oropharynx is clear and moist. No oropharyngeal exudate.   Eyes: Conjunctivae and EOM are normal. Right eye exhibits no discharge. Left eye exhibits no discharge. No scleral icterus.   Neck: Normal range of motion. Neck supple. JVD present.   Cardiovascular: Normal rate, regular rhythm, normal heart sounds and intact distal pulses.  Exam reveals no gallop and no friction rub.  No murmur heard.  Pulmonary/Chest: No respiratory distress. Effort mildly increased. Patient has diffuse end-alice wheezes and bibasilar rales.   Abdominal: Soft. Bowel sounds are normal. Patient exhibits no distension and no mass. There is no tenderness. PEG   Musculoskeletal: Normal range of motion. Patient exhibits generalized trace edema of LLE.   Neurological: Patient is alert and oriented to person, place, and time. Left hemiparesis.   Skin: Skin is warm and dry. No rash noted. Patient is not diaphoretic. No erythema. No pallor.   Psychiatric: Patient has a normal mood and affect. Behavior is normal. Judgment and thought content normal.       Laboratory    Recent Labs  Lab 02/21/18  0942   WBC 5.71   HGB 7.7*   HCT 25.1*   *       Recent Labs  Lab 02/21/18  0942   *   K 4.3   *   CO2 13*   BUN 62*   CREATININE 2.1*   CALCIUM 9.2   PROT 7.2   BILITOT 0.5   ALKPHOS 86   ALT 11   AST 12       No results for input(s): MG in the last 168 hours.    Recent Labs  Lab 02/21/18  1223   PH 7.293*   PCO2 35.0   PO2 79*   HCO3 17.0*   POCSATURATED 94*   BE -10       Diagnostic Results: Reviewed        Assessment / Recommendations     [facility] is consulted for hypoxia in this 57-yo female with PMHx including  diastolic dysfunction, asthma, and pulmonary sarcoidosis who was admitted via ED today.    Hypoxia with increased WOB, responded well to BiPAP  ABG c/w metabolic acidosis, uncompensated  NAGMA in setting of CKD, possibly 3 or 4  Signs of volume overload, elevated BNP  Echo 2016 with diastolic dysfunction, high ePAP  HTN managed with four agents  ACD  CVA with left hemiparesis    There are multiple reasonable etiologies of her hypoxia, including pulmonary edema, PE, asthma. Less likely is infection, for which she has uncompelling signs, although broad-spectrum antimicrobials are reasonable at this time. She has a h/o pulmonary sarcoidosis but this does not seem to be active and she says she is on no management for it. Recommend full infectious work up and agree with repeat echo and DVT ultrasound. CTA for PE is relatively contraindicated given the gradual onset of her SOB and her CKD. Initiate diuresis with Lasix. Solumedrol, Duo-Nebs, and antimicrobials are reasonable. Cont supplemental oxygen, HFNC vs NIPPV as needed. VTE PPx.     Note to be staffed by Dr. Shukla. Thank you for this consult. Pulmonary will continue to follow.     Dio Belle MD  Fellow, [facility]

## 2018-02-21 NOTE — PROGRESS NOTES
ATTN: TEAM DC PLANNING    UNABLE TO COMPLETE - PT RECEIVING @ BS TESTING     RN CM / SSW TO F/U WITH DC ASSESSMENT     Yanelis Meza RN  Case management 2/21/201811:57 AM  # [phone] (FAX) [phone]

## 2018-02-21 NOTE — PLAN OF CARE
ATTN: TEAM FLAVIO PLANNING       PT LIVES ALONE WHEEL CH BOUND - HAS [facility] IN [place] - PT STATES SHE HAS STRONG FAMILY SUPPORT PER HER CHILDREN     DME /: OWNS HOME O2 AND [facility]   HX: HOME HELATH PER [facility]     PHARMACY : MAIL ORDER PER [facility] AND ALSO [facility] ON [address]     IF ANY OTHER NEEDS ARISE - PLEASE CONTACT RN ANTONELLA / SSW TEAM     Yanelis Meza RN  Case management 2/21/201811:57 AM  # [phone] (FAX) [phone]     02/21/18 1401   Discharge Assessment   Assessment Type Discharge Planning Assessment   Confirmed/corrected address and phone number on facesheet? Yes   Assessment information obtained from? Patient   Communicated expected length of stay with patient/caregiver yes   Prior to hospitilization cognitive status: Alert/Oriented   Prior to hospitalization functional status: Assistive Equipment   Current cognitive status: Alert/Oriented   Current Functional Status: Assistive Equipment   Lives With spouse   Able to Return to Prior Arrangements yes   Is patient able to care for self after discharge? Yes   Patient currently being followed by outpatient case management? No   Patient currently receives any other outside agency services? No   Equipment Currently Used at Home wheelchair;hospital bed;bedside commode;shower chair;oxygen;lift device   Do you have any problems affording any of your prescribed medications? TBD   Is the patient taking medications as prescribed? yes   Does the patient have transportation home? Yes   Does the patient receive services at the [facility]? No   Discharge Plan A Home Health   Discharge Plan B Home Health   Patient/Family In Agreement With Plan yes

## 2018-02-21 NOTE — ASSESSMENT & PLAN NOTE
-  states patient uses only 2 L NC oxygen at night PRN, but not always.   - History of sarcoid, but  and patient didn't seem definitely aware of this.  - She does have some wheezing, but fair air movement.   - Some JVD.    - ABG consistent with hypoxia on BiPap  - CHF consideration given CXR but also would consider infectious process.   - BNP elevated, but patient with HTN and advanced CKD.   - Check 2-D ECHO.  - Flu swab.  Blood cultures pending, although at this point doesn't appear definitely infected, WBC WNL.    - Continue antibiotics.    - PCC consulted.  Discussed with Dr. Belle.

## 2018-02-21 NOTE — ASSESSMENT & PLAN NOTE
- Got Vanc, Rocephin, Zithromax in ED.   - Continue Rocephin Zithromax empirically for now.   - Empiric Tamiflu.  - Blood cultures pending.  Fluy swab negative.

## 2018-02-21 NOTE — ED PROVIDER NOTES
Encounter Date: 2/21/2018    SCRIBE #1 NOTE: Le IBRAHIM, nguyễn scribing for, and in the presence of, Dr. Morris.       History     Chief Complaint   Patient presents with    Shortness of Breath     SOB x 1 wk. seen last week when first started. hx of stroke in 2016 affecting swallowing ability.      Time seen by provider: 9:58 AM    This is a 57 y.o. female who presents with complaint of shortness of breath that worsened today. Hx is supported by  at bedside and daughter on the phone given dyspnea. Increasing shortness of breath worsens with laying flat. She reports using oxygen and nebulizer treatment at home with little relief. She denies fever, chills, cough, chest pain, dizziness, or lightheadedness. Patient was seen in the ED one week ago for shortness of breath. At that time, patient had diagnosed with hyperkalemia. Family notes SOB has progressively worsened. She denies receiving the flu vaccination this year. Per medical records, patient has a history of asthma, anemia, CVA, sarcoidosis, and DM. Patient has left side weakness resulting from previous stroke. History is primarily provided by the daughter and  due to speech difficulty resulting from previous stroke.       The history is provided by a relative.     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    Cortical cataract of both eyes 7/26/2016    Degeneration of lumbar or lumbosacral intervertebral disc 3/5/2013    S/p MRI L-spine 5/2009     Depression     General  anesthetics causing adverse effect in therapeutic use     Hemorrhagic cerebrovascular accident (CVA)     8/2016 [place] [facility] [place]is    Hemorrhagic cerebrovascular accident (CVA) 1/3/2018    History of stroke 6/28/2017    s/p R-MCA stroke with R-putaminal hemorrhagic transformation in 8/2016 and 11/2016 ([place] [facility] [place] with residual L hemiparesis, on AED s/p CVA      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    Hyperlipidemia     Hypertensive retinopathy of both eyes 7/26/2016    Impingement syndrome of right shoulder 7/2/2014    Left ventricular diastolic dysfunction with preserved systolic function 12/15/2015    Mixed anxiety and depressive disorder 3/5/2013    Obesity     THERESA (obstructive sleep apnea) 3/5/2013    No Home CPAP 2ndary to cost     Partial symptomatic epilepsy with complex partial seizures, not intractable, without status epilepticus     PEG (percutaneous endoscopic gastrostomy) status 6/28/2017    Renovascular hypertension 3/5/2013    Will resume home medications: amlodipine, labetalol, and hydralazine Will hold Lisinopril in setting of DARLING.   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 without macular edema, without long-term current use of insulin 8/2/2017    Type 2 diabetes mellitus with diabetic polyneuropathy, without long-term current use of insulin 10/22/2015    Type 2 diabetes mellitus with stage 3 chronic kidney disease, without long-term current use of insulin 10/22/2015    Vertebral artery stenosis 3/5/2013    S/p Stenting per Dr Burnett      Past Surgical History:   Procedure Laterality Date    BREAST SURGERY      breast  "reduction    COLONOSCOPY N/A 8/11/2016    Procedure: COLONOSCOPY;  Surgeon: Jerry Vilchis MD;  [facility]: [facility] ([address] [place] [address]);  Service: Endoscopy;  Laterality: N/A;  Patient reports difficulty awaking from anesthesia in the past.    HYSTERECTOMY      ROTATOR CUFF REPAIR Right July 9, 2014    right side    Skull surgery      Aneurysm    stent placed      in vertebral artery    TUBAL LIGATION       Family History   Problem Relation Age of Onset    Cancer Mother 55     Breast cancer    Diabetes Mother     Hypertension Mother     Heart disease Mother     Heart attack Mother     Stroke Sister     Hypertension Sister     Sleep apnea Sister     No Known Problems Daughter     No Known Problems Son     Bell's palsy Sister     Lupus Sister     Blindness Maternal Grandmother     Diabetes       "My entire family family has diabetes"    Stroke Maternal Aunt     Amblyopia Neg Hx     Cataracts Neg Hx     Glaucoma Neg Hx     Macular degeneration Neg Hx     Retinal detachment Neg Hx     Strabismus Neg Hx      Social History   Substance Use Topics    Smoking status: Never Smoker    Smokeless tobacco: Never Used    Alcohol use No      Comment: occasional wine cooler      Review of Systems   Constitutional: Negative for chills and fever.   HENT: Negative for sore throat.    Respiratory: Positive for shortness of breath. Negative for cough.    Cardiovascular: Negative for chest pain.   Gastrointestinal: Negative for abdominal pain, diarrhea, nausea and vomiting.   Genitourinary: Negative for dysuria.   Musculoskeletal: Negative for back pain.   Skin: Negative for rash.   Neurological: Negative for dizziness, weakness and light-headedness.   Hematological: Does not bruise/bleed easily.       Physical Exam     Initial Vitals [02/21/18 0906]   BP Pulse Resp Temp SpO2   (!) 160/69 65 (!) 24 97.4 °F (36.3 °C) (!) 88 %      MAP       99.33         Physical Exam    Nursing note and vitals " reviewed.  Constitutional: She appears well-developed and well-nourished. She is cooperative.  Non-toxic appearance. She appears distressed.   Chronically ill appearing.    HENT:   Head: Normocephalic and atraumatic.   Mouth/Throat: Oropharynx is clear and moist.   Eyes: Conjunctivae and EOM are normal. Pupils are equal, round, and reactive to light.   Neck: Normal range of motion and full passive range of motion without pain. Neck supple. No thyromegaly present. No JVD present.   Cardiovascular: Normal rate, regular rhythm, normal heart sounds and normal pulses.   Pulmonary/Chest: Tachypnea noted. She is in respiratory distress. She has rhonchi.   Increased WOB. Bilateral crackles.    Abdominal: Soft. Normal appearance and bowel sounds are normal. She exhibits no distension and no mass. There is no tenderness.   Musculoskeletal: Normal range of motion.   Trace lower extremity edema.    Neurological: She is alert and oriented to person, place, and time. She has normal strength. No cranial nerve deficit or sensory deficit.   Skin: Skin is warm, dry and intact. No rash noted. There is pallor.   Psychiatric: She has a normal mood and affect. Her speech is normal and behavior is normal. Judgment and thought content normal.         ED Course   Critical Care  Date/Time: 2/21/2018 1:07 PM  Performed by: SERGE MEZA  Authorized by: SERGE MEZA   Direct patient critical care time: 10 minutes  Additional history critical care time: 5 minutes  Ordering / reviewing critical care time: 10 minutes  Documentation critical care time: 5 minutes  Consulting other physicians critical care time: 5 minutes  Consult with family critical care time: 5 minutes  Total critical care time (exclusive of procedural time) : 40 minutes  Critical care was necessary to treat or prevent imminent or life-threatening deterioration of the following conditions: respiratory failure.  Critical care was time spent personally by me on the following  activities: evaluation of patient's response to treatment, examination of patient, ordering and performing treatments and interventions, ordering and review of laboratory studies, ordering and review of radiographic studies and re-evaluation of patient's condition.      Dictation #1  MRN:[id number]  [facility]:[id number]    Labs Reviewed   CBC W/ AUTO DIFFERENTIAL - Abnormal; Notable for the following:        Result Value    RBC 2.65 (*)     Hemoglobin 7.7 (*)     Hematocrit 25.1 (*)     MCHC 30.7 (*)     RDW 16.0 (*)     Platelets 106 (*)     Lymph # 0.5 (*)     Gran% 81.8 (*)     Lymph% 9.5 (*)     All other components within normal limits   COMPREHENSIVE METABOLIC PANEL - Abnormal; Notable for the following:     Sodium 146 (*)     Chloride 120 (*)     CO2 13 (*)     Glucose 150 (*)     BUN, Bld 62 (*)     Creatinine 2.1 (*)     eGFR if  29 (*)     eGFR if non  25 (*)     All other components within normal limits   B-TYPE NATRIURETIC PEPTIDE - Abnormal; Notable for the following:      (*)     All other components within normal limits   ISTAT PROCEDURE - Abnormal; Notable for the following:     POC PH 7.293 (*)     POC PO2 79 (*)     POC HCO3 17.0 (*)     POC SATURATED O2 94 (*)     All other components within normal limits   CULTURE, BLOOD   CULTURE, BLOOD   INFLUENZA A AND B ANTIGEN   LACTIC ACID, PLASMA   TROPONIN I   CK   TROPONIN I   CK   PROCALCITONIN     Imaging Results          US Lower Extremity Veins Left (In process)                X-Ray Chest 1 View (Final result)  Result time 02/21/18 10:24:29    Final result by Donny Joseph MD (02/21/18 10:24:29)                 Impression:      Interval development of moderate right bilateral pulmonary consolidation predominantly in a central and basilar distribution suggestive of moderate pulmonary edema.  Pneumonia is considered less likely.  Poor visualization of the left hemidiaphragm and left costophrenic angle.  A small layering  left-sided pleural effusion is suspected.  Prominence of the cardiac silhouette, stable.      Electronically signed by: SPEEDY FREEMAN MD  Date:     02/21/18  Time:    10:24              Narrative:    Comparison is made to prior examination dated 2/15/18.    A single AP radiograph of the chest was obtained.  The cardiac silhouette is prominent in size as before.  Atherosclerotic calcification is present within the aortic arch.  There is bilateral pulmonary consolidation identified predominantly within the mid to lower lungs more pronounced on the right than on the left.  Findings are suggestive of moderate pulmonary edema and this represents a detrimental change when compared to the prior examination.  Pneumonia is considered less likely but cannot be excluded.  There is poor visualization of the left hemidiaphragm.  A small layering left-sided pleural effusion is suspected.  There is no evidence for pneumothorax.  Bony structures are grossly intact.                              EKG Readings: (Independently Interpreted)   Normal sinus rhythm at a rate of 68 bpm. Normal axis. Normal intervals. No STEMI. Unchanged from February 15, 2018.        X-Rays:   Independently Interpreted Readings:   Chest X-Ray: Patchy infiltrate to right upper lung region. Unable to visualize left costophrenic angle.      Medical Decision Making:   Initial Assessment:     Urgent evaluation of 57-year-old female with sarcoid, rheumatoid, CK D, and limited mobility secondary to right MCA CVA and subarachnoid hemorrhage with resultant left-sided hemiparesis in 2016 here with shortness of breath.  Last echo 2016 with mild diastolic dysfunction, normal EF.  On exam patient appears chronically ill, with increased work of breathing, + diminished breath sounds bilaterally, with coarse rhonchi.  Hypoxic on arrival. Suspect aspiration pneumonia, influenza, acute CHF.  We'll plan to empirically treat with antibiotics, administer supplemental oxygen,   NIPPV and admit to hospital.    Independently Interpreted Test(s):   I have ordered and independently interpreted X-rays - see prior notes.  I have ordered and independently interpreted EKG Reading(s) - see prior notes  Clinical Tests:   Lab Tests: Ordered and Reviewed  Radiological Study: Ordered and Reviewed  Medical Tests: Ordered and Reviewed  ED Management:  Labs notable for stable anemia, CK D without hyperkalemia, , chest x-ray with bilateral pulmonary infiltrates    11:14 AM. Consulted and discussed with Dr. Roberts, [facility] medicine, who will admit the patient.               Attending Attestation:           Physician Attestation for Scribe:  Physician Attestation Statement for Scribe #1: I, Dr. Morris, reviewed documentation, as scribed by Le Rausch in my presence, and it is both accurate and complete.                    Clinical Impression:     1. Hypoxia    2. Shortness of breath    3. Pulmonary edema    4. History of stroke    5. Essential hypertension        Disposition:   Disposition: Admitted  Condition: Serious                        Marilynn Morris MD  02/21/18 [id number]

## 2018-02-22 LAB
ALBUMIN SERPL BCP-MCNC: 3.3 G/DL
ALP SERPL-CCNC: 76 U/L
ALT SERPL W/O P-5'-P-CCNC: <5 U/L
ANION GAP SERPL CALC-SCNC: 10 MMOL/L
AST SERPL-CCNC: 9 U/L
BASOPHILS # BLD AUTO: 0.01 K/UL
BASOPHILS NFR BLD: 0.2 %
BILIRUB SERPL-MCNC: 0.4 MG/DL
BUN SERPL-MCNC: 63 MG/DL
CALCIUM SERPL-MCNC: 9.1 MG/DL
CHLORIDE SERPL-SCNC: 118 MMOL/L
CHOLEST SERPL-MCNC: 162 MG/DL
CHOLEST/HDLC SERPL: 2.8 {RATIO}
CO2 SERPL-SCNC: 19 MMOL/L
CREAT SERPL-MCNC: 2.1 MG/DL
DIFFERENTIAL METHOD: ABNORMAL
EOSINOPHIL # BLD AUTO: 0 K/UL
EOSINOPHIL NFR BLD: 0 %
ERYTHROCYTE [DISTWIDTH] IN BLOOD BY AUTOMATED COUNT: 15.8 %
EST. GFR  (AFRICAN AMERICAN): 29 ML/MIN/1.73 M^2
EST. GFR  (NON AFRICAN AMERICAN): 25 ML/MIN/1.73 M^2
ESTIMATED AVG GLUCOSE: 91 MG/DL
GLUCOSE SERPL-MCNC: 158 MG/DL
HBA1C MFR BLD HPLC: 4.8 %
HCT VFR BLD AUTO: 24.9 %
HDLC SERPL-MCNC: 57 MG/DL
HDLC SERPL: 35.2 %
HGB BLD-MCNC: 7.7 G/DL
LDLC SERPL CALC-MCNC: 95.4 MG/DL
LYMPHOCYTES # BLD AUTO: 0.3 K/UL
LYMPHOCYTES NFR BLD: 6.6 %
MAGNESIUM SERPL-MCNC: 2.1 MG/DL
MCH RBC QN AUTO: 28.9 PG
MCHC RBC AUTO-ENTMCNC: 30.9 G/DL
MCV RBC AUTO: 94 FL
MONOCYTES # BLD AUTO: 0.1 K/UL
MONOCYTES NFR BLD: 1.9 %
NEUTROPHILS # BLD AUTO: 4.3 K/UL
NEUTROPHILS NFR BLD: 91.1 %
NONHDLC SERPL-MCNC: 105 MG/DL
PHOSPHATE SERPL-MCNC: 4.7 MG/DL
PLATELET # BLD AUTO: 148 K/UL
PMV BLD AUTO: 9.7 FL
POTASSIUM SERPL-SCNC: 4.1 MMOL/L
PROT SERPL-MCNC: 6.5 G/DL
RBC # BLD AUTO: 2.66 M/UL
SODIUM SERPL-SCNC: 147 MMOL/L
TRIGL SERPL-MCNC: 48 MG/DL
TSH SERPL DL<=0.005 MIU/L-ACNC: 0.52 UIU/ML
VANCOMYCIN SERPL-MCNC: 12.5 UG/ML
WBC # BLD AUTO: 4.71 K/UL

## 2018-02-22 PROCEDURE — 80053 COMPREHEN METABOLIC PANEL: CPT

## 2018-02-22 PROCEDURE — 27000221 HC OXYGEN, UP TO 24 HOURS

## 2018-02-22 PROCEDURE — 25000003 PHARM REV CODE 250: Performed by: HOSPITALIST

## 2018-02-22 PROCEDURE — 80061 LIPID PANEL: CPT

## 2018-02-22 PROCEDURE — 84443 ASSAY THYROID STIM HORMONE: CPT

## 2018-02-22 PROCEDURE — 36415 COLL VENOUS BLD VENIPUNCTURE: CPT

## 2018-02-22 PROCEDURE — 84100 ASSAY OF PHOSPHORUS: CPT

## 2018-02-22 PROCEDURE — 99900035 HC TECH TIME PER 15 MIN (STAT)

## 2018-02-22 PROCEDURE — 20000000 HC ICU ROOM

## 2018-02-22 PROCEDURE — 63600175 PHARM REV CODE 636 W HCPCS: Performed by: HOSPITALIST

## 2018-02-22 PROCEDURE — 85025 COMPLETE CBC W/AUTO DIFF WBC: CPT

## 2018-02-22 PROCEDURE — 99232 SBSQ HOSP IP/OBS MODERATE 35: CPT | Mod: ,,, | Performed by: HOSPITALIST

## 2018-02-22 PROCEDURE — 83036 HEMOGLOBIN GLYCOSYLATED A1C: CPT

## 2018-02-22 PROCEDURE — 80202 ASSAY OF VANCOMYCIN: CPT

## 2018-02-22 PROCEDURE — 94660 CPAP INITIATION&MGMT: CPT

## 2018-02-22 PROCEDURE — 99223 1ST HOSP IP/OBS HIGH 75: CPT | Mod: ,,, | Performed by: INTERNAL MEDICINE

## 2018-02-22 PROCEDURE — 25000242 PHARM REV CODE 250 ALT 637 W/ HCPCS: Performed by: HOSPITALIST

## 2018-02-22 PROCEDURE — 83735 ASSAY OF MAGNESIUM: CPT

## 2018-02-22 PROCEDURE — 94640 AIRWAY INHALATION TREATMENT: CPT

## 2018-02-22 PROCEDURE — 94761 N-INVAS EAR/PLS OXIMETRY MLT: CPT

## 2018-02-22 RX ORDER — FUROSEMIDE 10 MG/ML
80 INJECTION INTRAMUSCULAR; INTRAVENOUS ONCE
Status: COMPLETED | OUTPATIENT
Start: 2018-02-22 | End: 2018-02-22

## 2018-02-22 RX ADMIN — ASPIRIN 81 MG: 81 TABLET, COATED ORAL at 08:02

## 2018-02-22 RX ADMIN — LEVETIRACETAM 500 MG: 500 TABLET ORAL at 08:02

## 2018-02-22 RX ADMIN — HYDRALAZINE HYDROCHLORIDE 100 MG: 25 TABLET, FILM COATED ORAL at 05:02

## 2018-02-22 RX ADMIN — IPRATROPIUM BROMIDE AND ALBUTEROL SULFATE 3 ML: .5; 3 SOLUTION RESPIRATORY (INHALATION) at 01:02

## 2018-02-22 RX ADMIN — IPRATROPIUM BROMIDE AND ALBUTEROL SULFATE 3 ML: .5; 3 SOLUTION RESPIRATORY (INHALATION) at 07:02

## 2018-02-22 RX ADMIN — HEPARIN SODIUM 5000 UNITS: 5000 INJECTION, SOLUTION INTRAVENOUS; SUBCUTANEOUS at 05:02

## 2018-02-22 RX ADMIN — OSELTAMIVIR PHOSPHATE 30 MG: 6 POWDER, FOR SUSPENSION ORAL at 08:02

## 2018-02-22 RX ADMIN — FAMOTIDINE 20 MG: 20 TABLET, FILM COATED ORAL at 08:02

## 2018-02-22 RX ADMIN — ATORVASTATIN CALCIUM 40 MG: 20 TABLET, FILM COATED ORAL at 08:02

## 2018-02-22 RX ADMIN — HYDRALAZINE HYDROCHLORIDE 100 MG: 25 TABLET, FILM COATED ORAL at 02:02

## 2018-02-22 RX ADMIN — HEPARIN SODIUM 5000 UNITS: 5000 INJECTION, SOLUTION INTRAVENOUS; SUBCUTANEOUS at 09:02

## 2018-02-22 RX ADMIN — HYDRALAZINE HYDROCHLORIDE 100 MG: 25 TABLET, FILM COATED ORAL at 09:02

## 2018-02-22 RX ADMIN — AZITHROMYCIN MONOHYDRATE 250 MG: 500 INJECTION, POWDER, LYOPHILIZED, FOR SOLUTION INTRAVENOUS at 09:02

## 2018-02-22 RX ADMIN — HEPARIN SODIUM 5000 UNITS: 5000 INJECTION, SOLUTION INTRAVENOUS; SUBCUTANEOUS at 02:02

## 2018-02-22 RX ADMIN — LABETALOL HYDROCHLORIDE 200 MG: 200 TABLET, FILM COATED ORAL at 08:02

## 2018-02-22 RX ADMIN — FOLIC ACID 1 MG: 1 TABLET ORAL at 08:02

## 2018-02-22 RX ADMIN — CEFTRIAXONE 1 G: 1 INJECTION, POWDER, FOR SOLUTION INTRAMUSCULAR; INTRAVENOUS at 08:02

## 2018-02-22 RX ADMIN — AMLODIPINE BESYLATE 10 MG: 5 TABLET ORAL at 08:02

## 2018-02-22 RX ADMIN — FUROSEMIDE 80 MG: 10 INJECTION, SOLUTION INTRAVENOUS at 12:02

## 2018-02-22 NOTE — ASSESSMENT & PLAN NOTE
Results for SILVIACTLORRI ARASH CELAYA (MRN [id number]) as of 2/22/2018 07:16   Ref. Range 7/31/2017 16:30 11/24/2017 15:06 1/3/2018 13:32   Hemoglobin A1C Latest Ref Range: 4.0 - 5.6 % 5.4 4.8 4.7     - States is not in any DM medications.

## 2018-02-22 NOTE — SUBJECTIVE & OBJECTIVE
Interval History:   Breathes a lot better today.  CXR and LLE edema improved after Lasix yesterday with fluid off.  Afebrile, WBC WNL.        Review of Systems   Constitutional: Negative for appetite change and fever.   Eyes: Negative for redness and visual disturbance.   Respiratory: Negative for cough and shortness of breath.    Cardiovascular: Negative for chest pain and leg swelling.   Gastrointestinal: Negative for abdominal pain, nausea and vomiting.   Endocrine: Negative for cold intolerance and heat intolerance.   Genitourinary: Negative for dysuria and menstrual problem.   Musculoskeletal: Negative for gait problem and neck pain.   Skin: Negative.    Allergic/Immunologic: Negative for environmental allergies and food allergies.   Neurological: Positive for weakness. Negative for syncope.        Left hemiparesis     Hematological: Negative for adenopathy.   Psychiatric/Behavioral: Negative for agitation and confusion.     Objective:     Vital Signs (Most Recent):  Temp: 98.2 °F (36.8 °C) (02/22/18 0305)  Pulse: 76 (02/22/18 0713)  Resp: (!) 24 (02/22/18 0713)  BP: (!) 151/103 (02/22/18 0700)  SpO2: 96 % (02/22/18 0713) Vital Signs (24h Range):  Temp:  [97.4 °F (36.3 °C)-99.4 °F (37.4 °C)] 98.2 °F (36.8 °C)  Pulse:  [65-78] 76  Resp:  [19-30] 24  SpO2:  [88 %-98 %] 96 %  BP: (120-182)/() 151/103     Weight: 77.1 kg (169 lb 15.6 oz)  Body mass index is 30.11 kg/m².    Intake/Output Summary (Last 24 hours) at 02/22/18 0746  Last data filed at 02/22/18 0700   Gross per 24 hour   Intake              590 ml   Output             1945 ml   Net            -1355 ml      Physical Exam   Constitutional: She is oriented to person, place, and time. She appears well-developed and well-nourished.   HENT:   Head: Normocephalic and atraumatic.   Eyes: Conjunctivae are normal. Pupils are equal, round, and reactive to light.   Neck: Normal range of motion. Neck supple.   Cardiovascular: Normal rate, regular rhythm and  normal heart sounds.    Pulmonary/Chest: Effort normal. She has wheezes.   On NC, breathing easy; Slight low pitch exp wheeze, good air movement.    Abdominal: Soft. Bowel sounds are normal. She exhibits no mass. There is no guarding.   Musculoskeletal: Normal range of motion. She exhibits edema.   LLE edema Trace to 1+. No RLL edema.  Improved vs yesterday.    Neurological: She is alert and oriented to person, place, and time.   Skin: Skin is warm and dry.   Psychiatric: She has a normal mood and affect. Her behavior is normal.       Significant Labs:   Blood Culture:   Recent Labs  Lab 02/21/18  1209   LABBLOO No Growth to date  No Growth to date     BMP:   Recent Labs  Lab 02/22/18  0350   *   *   K 4.1   *   CO2 19*   BUN 63*   CREATININE 2.1*   CALCIUM 9.1   MG 2.1     CBC:   Recent Labs  Lab 02/21/18  0942 02/22/18  0350   WBC 5.71 4.71   HGB 7.7* 7.7*   HCT 25.1* 24.9*   * 148*       Significant Imaging: I have reviewed all pertinent imaging results/findings within the past 24 hours.   Imaging Results          US Lower Extremity Veins Left (Final result)  Result time 02/21/18 17:35:34    Final result by Anoop Perez MD (02/21/18 17:35:34)                 Impression:      No evidence of acute venous thrombosis of the left lower extremity.    Nonspecific edema in the left popliteal fossa.      Electronically signed by: ANOOP PEREZ MD  Date:     02/21/18  Time:    17:35              Narrative:    [id number]  02/21/18  16:20:00 [id number] ([facility]) : US LOWER EXTREMITY VEINS LEFT    SUPPLIED CLINICAL HISTORY:  Edema, eval for clot     ADDITIONAL CLINICAL HISTORY: N/A    TECHNIQUE: Duplex and color flow Doppler evaluation of the left lower extremities.    COMPARISON: None.    FINDINGS:  Left lower extremity has  no evidence of acute venous thrombosis in the common femoral, superficial femoral, greater saphenous, popliteal, peroneal, anterior tibial, and posterior tibial veins.  There is  nonspecific edema in the left popliteal fossa.                             X-Ray Chest 1 View (Final result)  Result time 02/21/18 10:24:29    Final result by Donny Joseph MD (02/21/18 10:24:29)                 Impression:      Interval development of moderate right bilateral pulmonary consolidation predominantly in a central and basilar distribution suggestive of moderate pulmonary edema.  Pneumonia is considered less likely.  Poor visualization of the left hemidiaphragm and left costophrenic angle.  A small layering left-sided pleural effusion is suspected.  Prominence of the cardiac silhouette, stable.      Electronically signed by: DONNY JOSEPH MD  Date:     02/21/18  Time:    10:24              Narrative:    Comparison is made to prior examination dated 2/15/18.    A single AP radiograph of the chest was obtained.  The cardiac silhouette is prominent in size as before.  Atherosclerotic calcification is present within the aortic arch.  There is bilateral pulmonary consolidation identified predominantly within the mid to lower lungs more pronounced on the right than on the left.  Findings are suggestive of moderate pulmonary edema and this represents a detrimental change when compared to the prior examination.  Pneumonia is considered less likely but cannot be excluded.  There is poor visualization of the left hemidiaphragm.  A small layering left-sided pleural effusion is suspected.  There is no evidence for pneumothorax.  Bony structures are grossly intact.

## 2018-02-22 NOTE — PROGRESS NOTES
Progress Note  Pulmonary & Critical Care Medicine      SUBJECTIVE:     Reason for consult: hypoxia    LOS: 1 days    Interval history  Diuresed 2 L yesterday with good response, able to come off BiPAP onto nasal cannula. No events overnight. VSS, afebrile. Today Pt says her breathing is better and her LLE is less swollen.     Scheduled Medications   albuterol-ipratropium 2.5mg-0.5mg/3mL  3 mL Nebulization Q6H WAKE    amLODIPine  10 mg Per G Tube Daily    aspirin  81 mg Per G Tube Daily    atorvastatin  40 mg Per G Tube Daily    azithromycin  250 mg Intravenous Daily    cefTRIAXone (ROCEPHIN) IVPB  1 g Intravenous Daily    famotidine  20 mg Per G Tube Daily    folic acid  1 mg Per G Tube Daily    heparin (porcine)  5,000 Units Subcutaneous Q8H    hydrALAZINE  100 mg Per G Tube TID    labetalol  200 mg Oral Q12H    levETIRAcetam  500 mg Oral Q12H    oseltamivir  30 mg Oral BID       Medications PRN  hydrALAZINE, ondansetron, pregabalin, ramelteon      OBJECTIVE:     Temp:  [97.4 °F (36.3 °C)-99.4 °F (37.4 °C)]   Pulse:  [65-78]   Resp:  [19-30]   BP: (120-182)/()   SpO2:  [88 %-98 %]     Vitals:    02/22/18 0713   BP:    Pulse: 76   Resp: (!) 24   Temp:        I & O (Last 24H):  I/O last 3 completed shifts:  In: 590 [NG/GT:90; IV Piggyback:500]  Out: 1945 [Urine:1945]    I/O this shift:  In: 30 [NG/GT:30]  Out: -       Physical Exam  Constitutional: Patient is oriented to person, place, and time. Comfortable on nasal cannula.    Neck: Normal range of motion. Neck supple. JVD present, improved.   Cardiovascular: Normal rate, regular rhythm, normal heart sounds and intact distal pulses.  Exam reveals no gallop, friction rub, or murmur.  Pulmonary/Chest: No respiratory distress. Effort normal. Patient has diffuse end-alice wheezes and bibasilar rales, improved vs prior.   Abdominal: Soft. Bowel sounds are normal. Patient exhibits no distension and no mass. There is no tenderness. PEG  c/d/i.  Musculoskeletal: Normal range of motion. Patient exhibits generalized trace edema of LLE, improved.   Skin: Skin is warm and dry. No rash noted. Patient is not diaphoretic. No erythema. No pallor.       Laboratory    Recent Labs  Lab 02/15/18  1245 02/21/18  0942 02/22/18  0350   WBC 4.78 5.71 4.71   HGB 8.1* 7.7* 7.7*   HCT 26.9* 25.1* 24.9*    106* 148*         Recent Labs  Lab 02/15/18  1245 02/21/18  0942 02/22/18  0350    146* 147*   K 6.1* 4.3 4.1   * 120* 118*   CO2 16* 13* 19*   BUN 70* 62* 63*   CREATININE 2.3* 2.1* 2.1*   CALCIUM 9.5 9.2 9.1   PROT 7.4 7.2 6.5   BILITOT 0.4 0.5 0.4   ALKPHOS 82 86 76   ALT 14 11 <5*   AST 12 12 9*       Recent Labs  Lab 02/22/18  0350   MG 2.1       Recent Labs  Lab 02/21/18  1223   PH 7.293*   PCO2 35.0   PO2 79*   HCO3 17.0*   POCSATURATED 94*   BE -10       Diagnostic Results: Reviewed        Assessment / Recommendations     PCCM is consulted for hypoxia in this 57-yo female with PMHx including diastolic dysfunction, asthma, THERESA (not on CPAP), and pulmonary sarcoidosis who presented to ED on 2/21 with one week of increasing dyspnea.    Acute hypoxemic respiratory failure, responded well to BiPAP, now on nasal cannula  ABG c/w metabolic acidosis, uncompensated  NAGMA in setting of CKD, possibly 3 or 4  Signs of volume overload, elevated BNP  Serial echos with diastolic dysfunction, high ePAP, mitral regurgitation  THERESA, not on CPAP, apparently for financial reasons  US DVT negative for VTE  HTN managed with four agents  ACD  CVA with left hemiparesis    Given new findings from her work up, it seems the likeliest explanation for her acute respiratory failure is that it was subacute and multifactorial, including diastolic dysfunction with superimposed elements of uncontrolled HTN, unmanaged THERESA, and being off Lasix (which was stopped many months ago in favor of HCTZ). Additionally, her mitral regurgitation helps explain why her edema favored the  right lung. Pulmonary sarcoidosis and VTE do not seem to be a component of her illness. Lasix IV again today. Stop antimicrobials and steroids. A consult to cardiology is reasonable given her worsening cardiac disease. Preventing recurrence will depend on good BP control, management of cardiac disease, resuming CPAP (can this be arranged this admission?), and possibly switching HCTZ to Lasix or at least adding prn Lasix at home. Wean O2 for goal SpO2 > 90%. Cont VTE PPx while in hospital.     Pt seen and examined with Dr. Shukla. Pulmonary will sign off. Thank you for this consult.    Dio Belle MD  [facility] Pulmonary & Critical Care Medicine

## 2018-02-22 NOTE — PLAN OF CARE
Problem: Patient Care Overview  Goal: Plan of Care Review  Outcome: Ongoing (interventions implemented as appropriate)  VSS, afebrile. AAO x 4. Did not require bipap overnight.  Reports breathing better this am.  Good output via bright, BM x2.  Tolerating diabetic diet.  Repostioned Q2H with no skin breakdown noted. Denied pain.  Safety maintained.   at bedside participating in care.  Up to date with plan of care

## 2018-02-22 NOTE — ASSESSMENT & PLAN NOTE
- Nebs  - One dose Solumedrol in ED  - Improving, good air movement some residual low pitched wheeze

## 2018-02-22 NOTE — HOSPITAL COURSE
Patient was admitted with acute shortness of breath with chest x-ray showing pulmonary edema. She was seen by the pulmonary/critical care service and cardiology and treated with aggressive diuresis.  Lower extremity edema and shortness of breath improved significantly.  2D echo showed grade II diastolic dysfunction with elevated PA pressure and severe left atrial enlargement.  LVEF was preserved.  After an initial period of improvement she started having worsening shortness of breath again.  She was noted to have chronically low H/H, which was thought due to CKD IV.  She was seen by the nephrology service and was treated for metabolic acidosis with bicarbonate and blood pressure control.  She was transfused with 2 units of PRBCs as well.  At discharge she had a significant improvement in shortness of breath and was anxious to go home.  Home health will be resumed.    Notably she has not followed up with her nephrologist in some time and we recommend she see him as outpatient as soon as possible.

## 2018-02-22 NOTE — ASSESSMENT & PLAN NOTE
- Got Vanc, Rocephin, Zithromax in ED.   - Continue Rocephin Zithromax empirically for now day 2.   - Empiric Tamiflu.  - Blood cultures 2/21 pending no growth so far.  Flu swab negative.   - CXR appears a little better after Lasix dose.   - Afebrile and WBC WNL.   - Symptoms possibly from edema.

## 2018-02-22 NOTE — PLAN OF CARE
Problem: Patient Care Overview  Goal: Plan of Care Review  Outcome: Ongoing (interventions implemented as appropriate)  Pt AAOx3 with left sided hemiparesis. VSS. Pt on 4L NC for most of ICU stay this shift; placed back on BiPAP at 1815 while sleeping.Bourgeois placed per MD order. Pt responsive to lasix 80m mg given this shift. U/O = 475ml since 1430. Spouse at bedside.   Pt resting comfortably.

## 2018-02-22 NOTE — ASSESSMENT & PLAN NOTE
- Continue home medications and monitor.   - Labetalol 200 BID.  At home takes 500 TID per MAR.   - hydralazine PRN.

## 2018-02-22 NOTE — ED NOTES
RT was called regarding patient transport to ICU on BiPAP and she stated that the patient was placed on 2L O2 NC as a trial off the BiPAP. She stated that the patient can proceed to ICU on NC and she will arrange for the BiPAP to be set up in the ICU. The patient was then transported to the ICU via stretcher on 2L O2 NC with a cardiac monitor.

## 2018-02-22 NOTE — PLAN OF CARE
Problem: Patient Care Overview  Goal: Plan of Care Review  Outcome: Ongoing (interventions implemented as appropriate)  BIPAP at bedside, patient resting on 4 lpm NC SpO2 95%. Aerosol treatment given, will continue to monitor.

## 2018-02-22 NOTE — ASSESSMENT & PLAN NOTE
-  states patient uses only 2 L NC oxygen at night PRN, but not always.   - History of sarcoid, but  and patient didn't seem definitely aware of this.  - She does have some wheezing, but fair air movement.   - Some JVD.    - ABG consistent with hypoxia on BiPap  - CHF consideration given CXR but also would consider infectious process.  - CXR appears improved today, LLE less edema today, would suggest diuresis helped.   - BNP elevated, but patient with HTN and advanced CKD.   - States she used to be on Lasix, but it was stopped.   - 2-D ECHO EF 60-65%, diastolic dysfunction.  - Flu swab negative.  Blood cultures pending no growth so far; at this point doesn't appear definitely infected, WBC WNL.    - Continue antibiotics.    - PCC following.  Discussed with Dr. Belle.

## 2018-02-22 NOTE — ASSESSMENT & PLAN NOTE
- Residual left hemiparesis, husbands states patient uses wheelchair.   - Discussed with  importance of turning patient, and he expressed understanding.   - PT OT

## 2018-02-23 PROBLEM — R09.02 HYPOXIA: Status: ACTIVE | Noted: 2018-02-23

## 2018-02-23 LAB
ALBUMIN SERPL BCP-MCNC: 3.4 G/DL
ALP SERPL-CCNC: 74 U/L
ALT SERPL W/O P-5'-P-CCNC: 8 U/L
ANION GAP SERPL CALC-SCNC: 11 MMOL/L
AST SERPL-CCNC: 9 U/L
BASOPHILS # BLD AUTO: 0.02 K/UL
BASOPHILS NFR BLD: 0.3 %
BILIRUB SERPL-MCNC: 0.5 MG/DL
BUN SERPL-MCNC: 75 MG/DL
CALCIUM SERPL-MCNC: 8.7 MG/DL
CHLORIDE SERPL-SCNC: 116 MMOL/L
CO2 SERPL-SCNC: 17 MMOL/L
CREAT SERPL-MCNC: 2.2 MG/DL
DIFFERENTIAL METHOD: ABNORMAL
EOSINOPHIL # BLD AUTO: 0.2 K/UL
EOSINOPHIL NFR BLD: 3.6 %
ERYTHROCYTE [DISTWIDTH] IN BLOOD BY AUTOMATED COUNT: 15.5 %
EST. GFR  (AFRICAN AMERICAN): 27 ML/MIN/1.73 M^2
EST. GFR  (NON AFRICAN AMERICAN): 24 ML/MIN/1.73 M^2
GLUCOSE SERPL-MCNC: 123 MG/DL
HCT VFR BLD AUTO: 23 %
HGB BLD-MCNC: 7.4 G/DL
LYMPHOCYTES # BLD AUTO: 1.2 K/UL
LYMPHOCYTES NFR BLD: 19.6 %
MAGNESIUM SERPL-MCNC: 2 MG/DL
MCH RBC QN AUTO: 29.6 PG
MCHC RBC AUTO-ENTMCNC: 32.2 G/DL
MCV RBC AUTO: 92 FL
MONOCYTES # BLD AUTO: 0.4 K/UL
MONOCYTES NFR BLD: 7.1 %
NEUTROPHILS # BLD AUTO: 4.2 K/UL
NEUTROPHILS NFR BLD: 69.2 %
PHOSPHATE SERPL-MCNC: 4 MG/DL
PLATELET # BLD AUTO: 146 K/UL
PMV BLD AUTO: 9.6 FL
POTASSIUM SERPL-SCNC: 4.2 MMOL/L
PROT SERPL-MCNC: 6.5 G/DL
RBC # BLD AUTO: 2.5 M/UL
SODIUM SERPL-SCNC: 144 MMOL/L
WBC # BLD AUTO: 6.06 K/UL

## 2018-02-23 PROCEDURE — 85025 COMPLETE CBC W/AUTO DIFF WBC: CPT

## 2018-02-23 PROCEDURE — 63600175 PHARM REV CODE 636 W HCPCS: Performed by: HOSPITALIST

## 2018-02-23 PROCEDURE — 11000001 HC ACUTE MED/SURG PRIVATE ROOM

## 2018-02-23 PROCEDURE — 83735 ASSAY OF MAGNESIUM: CPT

## 2018-02-23 PROCEDURE — 99232 SBSQ HOSP IP/OBS MODERATE 35: CPT | Mod: ,,, | Performed by: HOSPITALIST

## 2018-02-23 PROCEDURE — 94761 N-INVAS EAR/PLS OXIMETRY MLT: CPT

## 2018-02-23 PROCEDURE — 27000221 HC OXYGEN, UP TO 24 HOURS

## 2018-02-23 PROCEDURE — 94660 CPAP INITIATION&MGMT: CPT

## 2018-02-23 PROCEDURE — 99900035 HC TECH TIME PER 15 MIN (STAT)

## 2018-02-23 PROCEDURE — 25000242 PHARM REV CODE 250 ALT 637 W/ HCPCS: Performed by: HOSPITALIST

## 2018-02-23 PROCEDURE — 36415 COLL VENOUS BLD VENIPUNCTURE: CPT

## 2018-02-23 PROCEDURE — 84100 ASSAY OF PHOSPHORUS: CPT

## 2018-02-23 PROCEDURE — 99233 SBSQ HOSP IP/OBS HIGH 50: CPT | Mod: GC,,, | Performed by: INTERNAL MEDICINE

## 2018-02-23 PROCEDURE — 80053 COMPREHEN METABOLIC PANEL: CPT

## 2018-02-23 PROCEDURE — 94640 AIRWAY INHALATION TREATMENT: CPT

## 2018-02-23 PROCEDURE — 25000003 PHARM REV CODE 250: Performed by: HOSPITALIST

## 2018-02-23 RX ORDER — FUROSEMIDE 40 MG/1
40 TABLET ORAL DAILY
Status: DISCONTINUED | OUTPATIENT
Start: 2018-02-23 | End: 2018-02-23

## 2018-02-23 RX ORDER — FUROSEMIDE 10 MG/ML
80 INJECTION INTRAMUSCULAR; INTRAVENOUS ONCE
Status: COMPLETED | OUTPATIENT
Start: 2018-02-23 | End: 2018-02-23

## 2018-02-23 RX ADMIN — HEPARIN SODIUM 5000 UNITS: 5000 INJECTION, SOLUTION INTRAVENOUS; SUBCUTANEOUS at 09:02

## 2018-02-23 RX ADMIN — HEPARIN SODIUM 5000 UNITS: 5000 INJECTION, SOLUTION INTRAVENOUS; SUBCUTANEOUS at 05:02

## 2018-02-23 RX ADMIN — HEPARIN SODIUM 5000 UNITS: 5000 INJECTION, SOLUTION INTRAVENOUS; SUBCUTANEOUS at 01:02

## 2018-02-23 RX ADMIN — HYDRALAZINE HYDROCHLORIDE 100 MG: 25 TABLET, FILM COATED ORAL at 01:02

## 2018-02-23 RX ADMIN — IPRATROPIUM BROMIDE AND ALBUTEROL SULFATE 3 ML: .5; 3 SOLUTION RESPIRATORY (INHALATION) at 01:02

## 2018-02-23 RX ADMIN — HYDRALAZINE HYDROCHLORIDE 100 MG: 25 TABLET, FILM COATED ORAL at 09:02

## 2018-02-23 RX ADMIN — LEVETIRACETAM 500 MG: 500 TABLET ORAL at 09:02

## 2018-02-23 RX ADMIN — FOLIC ACID 1 MG: 1 TABLET ORAL at 08:02

## 2018-02-23 RX ADMIN — AMLODIPINE BESYLATE 10 MG: 5 TABLET ORAL at 08:02

## 2018-02-23 RX ADMIN — IPRATROPIUM BROMIDE AND ALBUTEROL SULFATE 3 ML: .5; 3 SOLUTION RESPIRATORY (INHALATION) at 07:02

## 2018-02-23 RX ADMIN — ASPIRIN 81 MG: 81 TABLET, COATED ORAL at 08:02

## 2018-02-23 RX ADMIN — FUROSEMIDE 80 MG: 10 INJECTION, SOLUTION INTRAVENOUS at 10:02

## 2018-02-23 RX ADMIN — FAMOTIDINE 20 MG: 20 TABLET, FILM COATED ORAL at 08:02

## 2018-02-23 RX ADMIN — LABETALOL HYDROCHLORIDE 200 MG: 200 TABLET, FILM COATED ORAL at 08:02

## 2018-02-23 RX ADMIN — HYDRALAZINE HYDROCHLORIDE 100 MG: 25 TABLET, FILM COATED ORAL at 05:02

## 2018-02-23 RX ADMIN — ATORVASTATIN CALCIUM 40 MG: 20 TABLET, FILM COATED ORAL at 08:02

## 2018-02-23 RX ADMIN — LEVETIRACETAM 500 MG: 500 TABLET ORAL at 08:02

## 2018-02-23 RX ADMIN — LABETALOL HYDROCHLORIDE 200 MG: 200 TABLET, FILM COATED ORAL at 09:02

## 2018-02-23 NOTE — PLAN OF CARE
Problem: Patient Care Overview  Goal: Plan of Care Review  Outcome: Ongoing (interventions implemented as appropriate)  Patient received on 2 lpm. Sat's %. Aerosol treatments given Q 6 wa. Patient in no apparent distress but remains tachypneic. Patient has history of THERESA. She states that she uses CPAP/BIPAP at home. Dr. Roberts, E-ICU, notified that she did not have an active order for CPAP/ BIPAP at this time. When asked patient her settings, she said that she is on 2-3. As this is not an appropriate setting, patient placed on 10/5 per Dr. Roberts. Patient was previously on BIPAP 10/5 for shortness of breath but denies any at this time. Patient wore through out night. Sat's %. Will continue to monitor.

## 2018-02-23 NOTE — ASSESSMENT & PLAN NOTE
- Got Vanc, Rocephin, Zithromax in ED.   - Was on Rocephin Zithromax empirically now stopped.   - Empiric Tamiflu stopped.  - Blood cultures 2/21 pending no growth so far.  Flu swab negative.   - CXR appears a little better after Lasix dose.   - Afebrile and WBC WNL.   - Symptoms possibly from edema.

## 2018-02-23 NOTE — PROGRESS NOTES
Progress Note  Pulmonary & Critical Care Medicine      SUBJECTIVE:     Reason for consult: hypoxia    LOS: 2 days    Interval history  Diuresed 2 L yesterday, negative 3 L for stay. Pt says she feels better and no longer SOB.     Scheduled Medications   albuterol-ipratropium 2.5mg-0.5mg/3mL  3 mL Nebulization Q6H WAKE    amLODIPine  10 mg Per G Tube Daily    aspirin  81 mg Per G Tube Daily    atorvastatin  40 mg Per G Tube Daily    famotidine  20 mg Per G Tube Daily    folic acid  1 mg Per G Tube Daily    heparin (porcine)  5,000 Units Subcutaneous Q8H    hydrALAZINE  100 mg Per G Tube TID    labetalol  200 mg Oral Q12H    levETIRAcetam  500 mg Oral Q12H       Medications PRN  hydrALAZINE, ondansetron, pregabalin, ramelteon      OBJECTIVE:     Temp:  [98 °F (36.7 °C)-99.5 °F (37.5 °C)]   Pulse:  [68-78]   Resp:  [19-36]   BP: (114-167)/()   SpO2:  [92 %-100 %]     Vitals:    02/23/18 1500   BP: 114/81   Pulse: 76   Resp: (!) 28   Temp: 98 °F (36.7 °C)       I & O (Last 24H):  I/O last 3 completed shifts:  In: 760 [P.O.:580; NG/GT:180]  Out: 3440 [Urine:3440]    I/O this shift:  In: -   Out: 575 [Urine:575]      Physical Exam  Constitutional: Patient is oriented to person, place, and time. Comfortable on nasal cannula.    Neck: Normal range of motion. Neck supple. JVD present, improved.   Cardiovascular: Normal rate, regular rhythm, normal heart sounds and intact distal pulses.  Exam reveals no gallop, friction rub, or murmur.  Pulmonary/Chest: No respiratory distress. Effort normal. Patient has diffuse end-alice wheezes and bibasilar rales, improved vs prior.   Abdominal: Soft. Bowel sounds are normal. Patient exhibits no distension and no mass. There is no tenderness. PEG c/d/i.  Musculoskeletal: Normal range of motion. Patient exhibits generalized trace edema of LLE, improved.   Skin: Skin is warm and dry. No rash noted. Patient is not diaphoretic. No erythema. No pallor.       Laboratory    Recent  Labs  Lab 02/21/18  0942 02/22/18  0350 02/23/18  0404   WBC 5.71 4.71 6.06   HGB 7.7* 7.7* 7.4*   HCT 25.1* 24.9* 23.0*   * 148* 146*         Recent Labs  Lab 02/21/18  0942 02/22/18  0350 02/23/18  0403   * 147* 144   K 4.3 4.1 4.2   * 118* 116*   CO2 13* 19* 17*   BUN 62* 63* 75*   CREATININE 2.1* 2.1* 2.2*   CALCIUM 9.2 9.1 8.7   PROT 7.2 6.5 6.5   BILITOT 0.5 0.4 0.5   ALKPHOS 86 76 74   ALT 11 <5* 8*   AST 12 9* 9*       Recent Labs  Lab 02/22/18  0350 02/23/18  0403   MG 2.1 2.0     No results for input(s): PH, PCO2, PO2, HCO3, POCSATURATED, BE in the last 24 hours.    Diagnostic Results: Reviewed        Assessment / Recommendations     PCCM is consulted for hypoxia in this 57-yo female with PMHx including diastolic dysfunction, asthma, THERESA (not on CPAP), and pulmonary sarcoidosis who presented to ED on 2/21 with one week of increasing dyspnea.    Acute hypoxemic respiratory failure, responded well to BiPAP, now on nasal cannula  ABG c/w metabolic acidosis, uncompensated  NAGMA in setting of CKD, possibly 3 or 4  Signs of volume overload, elevated BNP  Serial echos with diastolic dysfunction, high ePAP, mitral regurgitation  THERESA, not on CPAP, apparently for financial reasons  US DVT negative for VTE  HTN managed with four agents  ACD  CVA with left hemiparesis    Pt has improved clinically and symptomatically. Reasonable to give one more Lasix IV prior to removing Bourgeois. Consider Lasix po prn for home. Agree with referral to cardiology. Pt should also follow up with her nephrologist. Pt is stable for step down.     Pt seen and examined with Dr. Shukla. Pulmonary will sign off. Thank you for this consult.    Dio Belle MD  [facility] Pulmonary & Critical Care Medicine

## 2018-02-23 NOTE — PLAN OF CARE
CM met with pt and spouse, Wilder Perez, [phone], while rounding with . Spouse confirmed demographics correct but changed pharmacy in [facility]. Spouse states they have had [facility] and would like to continue with same. Spouse confirms they have all the DME needed.  CM to follow for discharge arrangements.     02/23/18 0809   Discharge Assessment   Assessment Type Discharge Planning Reassessment   Confirmed/corrected address and phone number on facesheet? Yes   Assessment information obtained from? Patient;Caregiver;Medical Record   Communicated expected length of stay with patient/caregiver yes   Prior to hospitilization cognitive status: Alert/Oriented   Prior to hospitalization functional status: Partially Dependent;Assistive Equipment;Wheelchair Bound   Current cognitive status: Alert/Oriented   Current Functional Status: Wheelchair Bound;Needs Assistance;Assistive Equipment   Lives With spouse   Able to Return to Prior Arrangements yes   Is patient able to care for self after discharge? Yes   Patient's perception of discharge disposition home or selfcare   Readmission Within The Last 30 Days no previous admission in last 30 days   Patient currently being followed by outpatient case management? No   Patient currently receives any other outside agency services? No   Equipment Currently Used at Home wheelchair;hospital bed;3-in-1 commode;oxygen;shower chair;lift device   Do you have any problems affording any of your prescribed medications? No   Is the patient taking medications as prescribed? yes   Does the patient have transportation home? Yes   Transportation Available family or friend will provide   Does the patient receive services at the [facility]? No   Discharge Plan A Home Health   Discharge Plan B Home Health   Patient/Family In Agreement With Plan yes

## 2018-02-23 NOTE — PROGRESS NOTES
[facility]  Cardiology  Progress Note    Patient Name: Lucy Perez  MRN: [id number]  Admission Date: 2/21/2018  Hospital Length of Stay: 2 days  Code Status: Prior   Attending Physician: Tommy Evans MD   Primary Care Physician: Beverly Muniz MD  Expected Discharge Date:   Principal Problem:Acute respiratory failure with hypoxia    Subjective:     Brief HPI:    56 yo female with hypertension, hypercholesterolemia and diabetes mellitus type 2. She has chronic kidney disease stage 4. 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 She says she was diagnosed with sarcoidosis in the 1990s, She presents with increasing dyspnea beginning about 2/13/2018. The shortness of breath got progressively worse and she presents to the ER on 2/21/2018he denies any chest pain. Legs were mildly edematous on presentation.     Hospital Course:     Diuresis.    Interval History:    Breathing easier. No chest pain.    Review of Systems   Cardiovascular: Negative for chest pain and leg swelling.   Respiratory: Positive for shortness of breath. Negative for cough, hemoptysis, sputum production and wheezing.      Objective:     Vital Signs (Most Recent):  Temp: 98 °F (36.7 °C) (02/23/18 1500)  Pulse: 76 (02/23/18 1500)  Resp: (!) 28 (02/23/18 1500)  BP: 114/81 (02/23/18 1500)  SpO2: (!) 92 % (02/23/18 1500) Vital Signs (24h Range):  Temp:  [98 °F (36.7 °C)-99.5 °F (37.5 °C)] 98 °F (36.7 °C)  Pulse:  [68-77] 76  Resp:  [19-36] 28  SpO2:  [92 %-100 %] 92 %  BP: (114-167)/() 114/81     Weight: 77.3 kg (170 lb 6.7 oz)  Body mass index is 30.19 kg/m².    SpO2: (!) 92 %  O2 Device (Oxygen Therapy): nasal cannula w/ humidification      Intake/Output Summary (Last 24 hours) at 02/23/18 5764  Last data filed at 02/23/18 1400   Gross per 24 hour   Intake              640 ml   Output             2745 ml   Net            -2105 ml       Lines/Drains/Airways     Drain                  Gastrostomy/Enterostomy LUQ -- days         Gastrostomy/Enterostomy Percutaneous endoscopic gastrostomy (PEG) LUQ -- days         Urethral Catheter 02/21/18 1430 Non-latex 16 Fr. 2 days          Epidural Line                 Perineural Analgesia/Anesthesia Assessment (using dermatomes) Epidural -- days          Peripheral Intravenous Line                 Midline Catheter Insertion/Assessment  - Single Lumen 06/13/17 0934 Right cephalic vein (lateral side of arm) 20g x 8cm 255 days         Peripheral IV - Single Lumen 02/21/18 1210 Right Forearm 2 days                Physical Exam   Cardiovascular: Normal rate and regular rhythm.  Exam reveals gallop and S4. Exam reveals no friction rub.    Murmur heard.  High-pitched blowing holosystolic murmur is present with a grade of 2/6  at the apex  Pulmonary/Chest: She has rhonchi.   Musculoskeletal:        Right ankle: She exhibits no swelling.        Left ankle: She exhibits no swelling.   Skin: Skin is warm and dry. No rash noted. Nails show no clubbing.       Current Medications:     albuterol-ipratropium 2.5mg-0.5mg/3mL  3 mL Nebulization Q6H WAKE    amLODIPine  10 mg Per G Tube Daily    aspirin  81 mg Per G Tube Daily    atorvastatin  40 mg Per G Tube Daily    famotidine  20 mg Per G Tube Daily    folic acid  1 mg Per G Tube Daily    heparin (porcine)  5,000 Units Subcutaneous Q8H    hydrALAZINE  100 mg Per G Tube TID    labetalol  200 mg Oral Q12H    levETIRAcetam  500 mg Oral Q12H     Current Laboratory Results:    Recent Results (from the past 24 hour(s))   Magnesium    Collection Time: 02/23/18  4:03 AM   Result Value Ref Range    Magnesium 2.0 1.6 - 2.6 mg/dL   Phosphorus    Collection Time: 02/23/18  4:03 AM   Result Value Ref Range    Phosphorus 4.0 2.7 - 4.5 mg/dL   Comprehensive metabolic panel    Collection Time: 02/23/18  4:03 AM   Result Value Ref Range    Sodium 144 136 - 145 mmol/L    Potassium 4.2 3.5 - 5.1 mmol/L    Chloride 116 (H) 95 -  110 mmol/L    CO2 17 (L) 23 - 29 mmol/L    Glucose 123 (H) 70 - 110 mg/dL    BUN, Bld 75 (H) 6 - 20 mg/dL    Creatinine 2.2 (H) 0.5 - 1.4 mg/dL    Calcium 8.7 8.7 - 10.5 mg/dL    Total Protein 6.5 6.0 - 8.4 g/dL    Albumin 3.4 (L) 3.5 - 5.2 g/dL    Total Bilirubin 0.5 0.1 - 1.0 mg/dL    Alkaline Phosphatase 74 55 - 135 U/L    AST 9 (L) 10 - 40 U/L    ALT 8 (L) 10 - 44 U/L    Anion Gap 11 8 - 16 mmol/L    eGFR if African American 27 (A) >60 mL/min/1.73 m^2    eGFR if non African American 24 (A) >60 mL/min/1.73 m^2   CBC auto differential    Collection Time: 02/23/18  4:04 AM   Result Value Ref Range    WBC 6.06 3.90 - 12.70 K/uL    RBC 2.50 (L) 4.00 - 5.40 M/uL    Hemoglobin 7.4 (L) 12.0 - 16.0 g/dL    Hematocrit 23.0 (L) 37.0 - 48.5 %    MCV 92 82 - 98 fL    MCH 29.6 27.0 - 31.0 pg    MCHC 32.2 32.0 - 36.0 g/dL    RDW 15.5 (H) 11.5 - 14.5 %    Platelets 146 (L) 150 - 350 K/uL    MPV 9.6 9.2 - 12.9 fL    Gran # (ANC) 4.2 1.8 - 7.7 K/uL    Lymph # 1.2 1.0 - 4.8 K/uL    Mono # 0.4 0.3 - 1.0 K/uL    Eos # 0.2 0.0 - 0.5 K/uL    Baso # 0.02 0.00 - 0.20 K/uL    Gran% 69.2 38.0 - 73.0 %    Lymph% 19.6 18.0 - 48.0 %    Mono% 7.1 4.0 - 15.0 %    Eosinophil% 3.6 0.0 - 8.0 %    Basophil% 0.3 0.0 - 1.9 %    Differential Method Automated      Current Imaging Results:    Imaging Results          US Lower Extremity Veins Left (Final result)  Result time 02/21/18 17:35:34    Final result by Anoop Perez MD (02/21/18 17:35:34)                 Impression:      No evidence of acute venous thrombosis of the left lower extremity.    Nonspecific edema in the left popliteal fossa.      Electronically signed by: ANOOP PEREZ MD  Date:     02/21/18  Time:    17:35              Narrative:    [id number]  02/21/18  16:20:00 [id number] ([facility]) : US LOWER EXTREMITY VEINS LEFT    SUPPLIED CLINICAL HISTORY:  Edema, eval for clot     ADDITIONAL CLINICAL HISTORY: N/A    TECHNIQUE: Duplex and color flow Doppler evaluation of the left lower  extremities.    COMPARISON: None.    FINDINGS:  Left lower extremity has  no evidence of acute venous thrombosis in the common femoral, superficial femoral, greater saphenous, popliteal, peroneal, anterior tibial, and posterior tibial veins.  There is nonspecific edema in the left popliteal fossa.                             X-Ray Chest 1 View (Final result)  Result time 02/21/18 10:24:29    Final result by Donny Joseph MD (02/21/18 10:24:29)                 Impression:      Interval development of moderate right bilateral pulmonary consolidation predominantly in a central and basilar distribution suggestive of moderate pulmonary edema.  Pneumonia is considered less likely.  Poor visualization of the left hemidiaphragm and left costophrenic angle.  A small layering left-sided pleural effusion is suspected.  Prominence of the cardiac silhouette, stable.      Electronically signed by: DONNY JOSEPH MD  Date:     02/21/18  Time:    10:24              Narrative:    Comparison is made to prior examination dated 2/15/18.    A single AP radiograph of the chest was obtained.  The cardiac silhouette is prominent in size as before.  Atherosclerotic calcification is present within the aortic arch.  There is bilateral pulmonary consolidation identified predominantly within the mid to lower lungs more pronounced on the right than on the left.  Findings are suggestive of moderate pulmonary edema and this represents a detrimental change when compared to the prior examination.  Pneumonia is considered less likely but cannot be excluded.  There is poor visualization of the left hemidiaphragm.  A small layering left-sided pleural effusion is suspected.  There is no evidence for pneumothorax.  Bony structures are grossly intact.                              Assessment and Plan:     Problem List:    Active Diagnoses:    Diagnosis Date Noted POA    PRINCIPAL PROBLEM:  Acute respiratory failure with hypoxia [J96.01] 11/07/2016 Yes     Hypoxia [R09.02] 02/23/2018 Yes    Shortness of breath [R06.02] 02/21/2018 Yes    Wheezing [R06.2] 02/21/2018 Yes    Edema of left lower extremity [R60.0] 02/21/2018 Yes    Pneumonia due to infectious organism [J18.9] 02/21/2018 Yes    Anemia in stage 3 chronic kidney disease [N18.3, D63.1] 08/02/2017 Yes    Controlled type 2 diabetes mellitus with stage 3 chronic kidney disease, without long-term current use of insulin [E11.22, N18.3] 08/02/2017 Yes    History of stroke [Z86.73] 06/28/2017 Not Applicable    PEG (percutaneous endoscopic gastrostomy) status [Z93.1] 06/28/2017 Not Applicable     Chronic    Constipation [K59.00] 06/16/2017 Yes    Acute on chronic diastolic heart failure [I50.33] 11/07/2016 Yes    Stage 3 chronic kidney disease [N18.3] 03/25/2013 Yes    Hyperlipidemia [E78.5] 03/05/2013 Yes    Renovascular hypertension [I15.0] 03/05/2013 Yes     Chronic      Problems Resolved During this Admission:    Diagnosis Date Noted Date Resolved POA     Assessment and Plan:     1. Heart Failure, Diastolic, Acute on Chronic              2/21/2018: Echo: Normal left ventricular size and systolic function. Moderate LVH. Moderate diastolic dysfunction. Severely dilated LA. Mild to moderate MR. Small pericardial effusion.              Diuresis.              Follow up CXR.     2. History of Cerebrovascular Accident              2016: R MCA CVA. Caused left hemiplegia.     3. Hypertension   On labetalol, hydralazine and amlodipine.              Severe.     4. History of Sarcoid              1990s: Diagnosed.      VTE Risk Mitigation         Ordered     heparin (porcine) injection 5,000 Units  Every 8 hours     Route:  Subcutaneous        02/21/18 4531          Will Zavala MD  Cardiology  [facility]

## 2018-02-23 NOTE — ASSESSMENT & PLAN NOTE
-  thinks appears more swollen.   - LLE US no evidence DVT.  - Back to baseline with diuresis.

## 2018-02-23 NOTE — SUBJECTIVE & OBJECTIVE
Interval History:   Doing better, breathing better.     Review of Systems   Constitutional: Negative for appetite change and fever.   Eyes: Negative for redness and visual disturbance.   Respiratory: Negative for cough and shortness of breath.    Cardiovascular: Negative for chest pain and leg swelling.   Gastrointestinal: Negative for abdominal pain, nausea and vomiting.   Endocrine: Negative for cold intolerance and heat intolerance.   Genitourinary: Negative for dysuria and menstrual problem.   Musculoskeletal: Negative for gait problem and neck pain.   Skin: Negative.    Allergic/Immunologic: Negative for environmental allergies and food allergies.   Neurological: Positive for weakness. Negative for syncope.        Left hemiparesis     Hematological: Negative for adenopathy.   Psychiatric/Behavioral: Negative for agitation and confusion.     Objective:     Vital Signs (Most Recent):  Temp: 98.2 °F (36.8 °C) (02/23/18 0305)  Pulse: 77 (02/23/18 0717)  Resp: (!) 31 (02/23/18 0717)  BP: (!) 160/108 (02/23/18 0600)  SpO2: 100 % (02/23/18 0717) Vital Signs (24h Range):  Temp:  [98 °F (36.7 °C)-99.5 °F (37.5 °C)] 98.2 °F (36.8 °C)  Pulse:  [68-78] 77  Resp:  [16-36] 31  SpO2:  [95 %-100 %] 100 %  BP: (134-160)/() 160/108     Weight: 77.3 kg (170 lb 6.7 oz)  Body mass index is 30.19 kg/m².    Intake/Output Summary (Last 24 hours) at 02/23/18 0733  Last data filed at 02/23/18 0600   Gross per 24 hour   Intake              640 ml   Output             2170 ml   Net            -1530 ml      Physical Exam   Constitutional: She is oriented to person, place, and time. She appears well-developed and well-nourished.   HENT:   Head: Normocephalic and atraumatic.   Eyes: Conjunctivae are normal. Pupils are equal, round, and reactive to light.   Neck: Normal range of motion. Neck supple.   Cardiovascular: Normal rate, regular rhythm and normal heart sounds.    Pulmonary/Chest: Effort normal. She has rales.   Breathing easy;  rales to right mid lower posterior   Abdominal: Soft. Bowel sounds are normal. She exhibits no mass. There is no guarding.   Musculoskeletal: Normal range of motion. She exhibits edema.   LLE edema Trace. No RLL edema.  Improved vs yesterday.    Neurological: She is alert and oriented to person, place, and time.   Skin: Skin is warm and dry.   Psychiatric: She has a normal mood and affect. Her behavior is normal.       Significant Labs:   BMP:   Recent Labs  Lab 02/23/18  0403   *      K 4.2   *   CO2 17*   BUN 75*   CREATININE 2.2*   CALCIUM 8.7   MG 2.0     CBC:   Recent Labs  Lab 02/21/18  0942 02/22/18  0350 02/23/18  0404   WBC 5.71 4.71 6.06   HGB 7.7* 7.7* 7.4*   HCT 25.1* 24.9* 23.0*   * 148* 146*       Significant Imaging: I have reviewed all pertinent imaging results/findings within the past 24 hours.   Imaging Results          US Lower Extremity Veins Left (Final result)  Result time 02/21/18 17:35:34    Final result by Anoop Perez MD (02/21/18 17:35:34)                 Impression:      No evidence of acute venous thrombosis of the left lower extremity.    Nonspecific edema in the left popliteal fossa.      Electronically signed by: ANOOP PEREZ MD  Date:     02/21/18  Time:    17:35              Narrative:    [id number]  02/21/18  16:20:00 [id number] ([facility]) : US LOWER EXTREMITY VEINS LEFT    SUPPLIED CLINICAL HISTORY:  Edema, eval for clot     ADDITIONAL CLINICAL HISTORY: N/A    TECHNIQUE: Duplex and color flow Doppler evaluation of the left lower extremities.    COMPARISON: None.    FINDINGS:  Left lower extremity has  no evidence of acute venous thrombosis in the common femoral, superficial femoral, greater saphenous, popliteal, peroneal, anterior tibial, and posterior tibial veins.  There is nonspecific edema in the left popliteal fossa.                             X-Ray Chest 1 View (Final result)  Result time 02/21/18 10:24:29    Final result by Donny Joseph MD (02/21/18  10:24:29)                 Impression:      Interval development of moderate right bilateral pulmonary consolidation predominantly in a central and basilar distribution suggestive of moderate pulmonary edema.  Pneumonia is considered less likely.  Poor visualization of the left hemidiaphragm and left costophrenic angle.  A small layering left-sided pleural effusion is suspected.  Prominence of the cardiac silhouette, stable.      Electronically signed by: SPEEDY FREEMAN MD  Date:     02/21/18  Time:    10:24              Narrative:    Comparison is made to prior examination dated 2/15/18.    A single AP radiograph of the chest was obtained.  The cardiac silhouette is prominent in size as before.  Atherosclerotic calcification is present within the aortic arch.  There is bilateral pulmonary consolidation identified predominantly within the mid to lower lungs more pronounced on the right than on the left.  Findings are suggestive of moderate pulmonary edema and this represents a detrimental change when compared to the prior examination.  Pneumonia is considered less likely but cannot be excluded.  There is poor visualization of the left hemidiaphragm.  A small layering left-sided pleural effusion is suspected.  There is no evidence for pneumothorax.  Bony structures are grossly intact.

## 2018-02-23 NOTE — CONSULTS
[facility]  Cardiology  Consult Note    Patient Name: Lucy Perez  MRN: [id number]  Admission Date: 2/21/2018  Hospital Length of Stay: 1 days  Code Status: Prior   Attending Provider: Amena Rolle MD   Consulting Provider: Will Branham MD  Primary Care Physician: Beverly Muniz MD  Principal Problem:Acute respiratory failure with hypoxia    Patient information was obtained from patient, spouse/SO and past medical records.     Inpatient consult to Cardiology  Consult performed by: WILL BRANHAM  Consult ordered by: AMENA ROLLE        Subjective:     Chief Complaint:  Shortness of Breath    HPI: 58 yo female with hypertension, hypercholesterolemia and diabetes mellitus type 2. She has chronic kidney disease stage 4. 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 She says she was diagnosed with sarcoidosis in the 1990s, She presents with increasing dyspnea beginning about 2/13/2018. The shortness of breath got progressively worse and she presents to the ER on 2/21/2018he denies any chest pain. Legs were mildly edematous on presentation.     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    Cortical cataract of both eyes 7/26/2016    Degeneration of lumbar or lumbosacral intervertebral disc 3/5/2013    S/p MRI L-spine 5/2009     Depression     General anesthetics causing adverse effect in therapeutic use     Hemorrhagic cerebrovascular accident (CVA)     8/2016 s/p Hemicraniotomy at [facility] with Left hemiparesis    Hemorrhagic cerebrovascular accident (CVA) 1/3/2018    History of stroke 6/28/2017    s/p R-MCA stroke with R-putaminal  hemorrhagic transformation in 8/2016 and 11/2016 (s/p [facility]micraniotomy at [facility]) with residual L hemiparesis, on AED s/p CVA      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    Hyperlipidemia     Hypertensive retinopathy of both eyes 7/26/2016    Impingement syndrome of right shoulder 7/2/2014    Left ventricular diastolic dysfunction with preserved systolic function 12/15/2015    Mixed anxiety and depressive disorder 3/5/2013    Obesity     THERESA (obstructive sleep apnea) 3/5/2013    No Home CPAP 2ndary to cost     Partial symptomatic epilepsy with complex partial seizures, not intractable, without status epilepticus     PEG (percutaneous endoscopic gastrostomy) status 6/28/2017    Renovascular hypertension 3/5/2013    Will resume home medications: amlodipine, labetalol, and hydralazine Will hold Lisinopril in setting of DARLING.   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 without macular edema, without long-term current use of insulin 8/2/2017    Type 2 diabetes mellitus with diabetic polyneuropathy, without long-term current use of insulin 10/22/2015    Type 2 diabetes mellitus with stage 3 chronic kidney disease, without long-term current use of insulin 10/22/2015    Vertebral artery stenosis 3/5/2013    S/p Stenting per Dr Burnett        Past Surgical History:   Procedure Laterality Date    BREAST SURGERY      breast reduction    COLONOSCOPY N/A 8/11/2016    Procedure: COLONOSCOPY;  Surgeon: Jerry Vilchis MD;  Location: [facility] ([address] [place] [address]);  Service: Endoscopy;  Laterality: N/A;  Patient reports difficulty awaking from anesthesia in the past.    HYSTERECTOMY      ROTATOR CUFF REPAIR Right  "July 9, 2014    right side    Skull surgery      Aneurysm    stent placed      in vertebral artery    TUBAL LIGATION         Review of patient's allergies indicates:   Allergen Reactions    Lisinopril Other (See Comments)     Angioedema      Vicodin [hydrocodone-acetaminophen] Rash       No current facility-administered medications on file prior to encounter.      Current Outpatient Prescriptions on File Prior to Encounter   Medication Sig    albuterol (PROVENTIL) 2.5 mg /3 mL (0.083 %) nebulizer solution Take 3 mLs (2.5 mg total) by nebulization every 6 (six) hours as needed for Wheezing. Rescue    amlodipine (NORVASC) 10 MG tablet Take 1 tablet (10 mg total) by mouth once daily.    aspirin (ECOTRIN) 81 MG EC tablet Take 81 mg by mouth once daily.      atorvastatin (LIPITOR) 40 MG tablet TAKE 1 TABLET ONE TIME DAILY FOR CHOLESTEROL    BD INSULIN PEN NEEDLE UF SHORT 31 gauge x 5/16" Ndle USE TO INJECT NOVOLOG FLEXPEN BEFORE MEALS    blood sugar diagnostic (ACCU-CHEK SMARTVIEW TEST STRIP) Strp 1 strip by Misc.(Non-Drug; Combo Route) route 2 (two) times daily.    dantrolene (DANTRIUM) 25 MG Cap 3 caps 3x/day    famotidine (PEPCID) 20 MG tablet TAKE 1 TABLET (20 MG TOTAL) BY MOUTH ONCE DAILY.    ferrous sulfate 220 mg (44 mg iron)/5 mL solution 10ml daily for Iron/Give via PEG    hydrALAZINE (APRESOLINE) 100 MG tablet Take 1 tablet (100 mg total) by mouth 3 (three) times daily.    hydroCHLOROthiazide (HYDRODIURIL) 25 MG tablet Take 1 tablet (25 mg total) by mouth once daily.    labetalol (NORMODYNE) 100 MG tablet Take 5 tablets (500 mg total) by mouth every 8 (eight) hours.    levetiracetam (KEPPRA) 500 MG Tab Take 1 tablet (500 mg total) by mouth every 8 (eight) hours.    multivitamin with iron Tab 1/day (Patient taking differently: Take 1 tablet by mouth once daily. )    pregabalin (LYRICA) 25 MG capsule 1 cap in AM and 2 caps at Bedtime for muscle spasm    folic acid (FOLVITE) 1 MG tablet Take 1 " tablet (1 mg total) by mouth once daily.     Family History     Problem Relation (Age of Onset)    Bell's palsy Sister    Blindness Maternal Grandmother    Cancer Mother (55)    Diabetes Mother,     Heart attack Mother    Heart disease Mother    Hypertension Mother, Sister    Lupus Sister    No Known Problems Daughter, Son    Sleep apnea Sister    Stroke Sister, Maternal Aunt        Social History Main Topics    Smoking status: Never Smoker    Smokeless tobacco: Never Used    Alcohol use No      Comment: occasional wine cooler     Drug use: No    Sexual activity: Yes     Partners: Male     Review of Systems   Constitution: Positive for fever and weakness. Negative for chills and malaise/fatigue.   HENT: Negative for nosebleeds.    Eyes: Negative for double vision, vision loss in left eye and vision loss in right eye.   Cardiovascular: Positive for leg swelling. Negative for chest pain, claudication, dyspnea on exertion, irregular heartbeat, near-syncope, orthopnea, palpitations, paroxysmal nocturnal dyspnea and syncope.   Respiratory: Positive for shortness of breath. Negative for cough, hemoptysis and wheezing.    Endocrine: Negative for cold intolerance and heat intolerance.   Hematologic/Lymphatic: Negative for bleeding problem. Does not bruise/bleed easily.   Skin: Negative for color change and rash.   Musculoskeletal: Negative for back pain, falls, muscle weakness and myalgias.   Gastrointestinal: Negative for heartburn, hematemesis, hematochezia, hemorrhoids, jaundice, melena, nausea and vomiting.        PEG in place.   Genitourinary: Negative for dysuria and hematuria.   Neurological: Positive for focal weakness (left sided hemiplegia). Negative for dizziness, headaches, light-headedness, loss of balance, numbness and vertigo.   Psychiatric/Behavioral: Negative for altered mental status, depression and memory loss. The patient is not nervous/anxious.    Allergic/Immunologic: Negative for hives and  persistent infections.     Objective:     Vital Signs (Most Recent):  Temp: 98.2 °F (36.8 °C) (02/22/18 1500)  Pulse: 72 (02/22/18 1500)  Resp: (!) 30 (02/22/18 1500)  BP: (!) 148/68 (02/22/18 1500)  SpO2: 95 % (02/22/18 1500) Vital Signs (24h Range):  Temp:  [97.9 °F (36.6 °C)-99.4 °F (37.4 °C)] 98.2 °F (36.8 °C)  Pulse:  [68-78] 72  Resp:  [16-30] 30  SpO2:  [93 %-98 %] 95 %  BP: (120-157)/() 148/68     Weight: 77.1 kg (169 lb 15.6 oz)  Body mass index is 30.11 kg/m².    SpO2: 95 %  O2 Device (Oxygen Therapy): nasal cannula      Intake/Output Summary (Last 24 hours) at 02/22/18 1821  Last data filed at 02/22/18 0705   Gross per 24 hour   Intake              120 ml   Output             1470 ml   Net            -1350 ml       Lines/Drains/Airways     Drain                 Gastrostomy/Enterostomy LUQ -- days         Gastrostomy/Enterostomy Percutaneous endoscopic gastrostomy (PEG) LUQ -- days         Urethral Catheter 02/21/18 1430 Non-latex 16 Fr. 1 day          Epidural Line                 Perineural Analgesia/Anesthesia Assessment (using dermatomes) Epidural -- days          Peripheral Intravenous Line                 Midline Catheter Insertion/Assessment  - Single Lumen 06/13/17 0934 Right cephalic vein (lateral side of arm) 20g x 8cm 254 days         Peripheral IV - Single Lumen 02/21/18 1210 Right Forearm 1 day                Physical Exam   Constitutional: She is oriented to person, place, and time. She appears well-developed and well-nourished. She appears ill. No distress.   HENT:   Head: Normocephalic and atraumatic.   Nose: Nose normal.   Eyes: Right eye exhibits no discharge. Left eye exhibits no discharge. Right conjunctiva is not injected. Left conjunctiva is not injected. Right pupil is round. Left pupil is round. Pupils are equal.   Neck: Neck supple. JVD present. Carotid bruit is not present. No thyromegaly present.   Cardiovascular: Normal rate, regular rhythm, S1 normal and S2 normal.   No  extrasystoles are present. PMI is not displaced.  Exam reveals gallop and S4.    Pulses:       Radial pulses are 2+ on the right side, and 2+ on the left side.        Femoral pulses are 2+ on the right side, and 2+ on the left side.       Dorsalis pedis pulses are 1+ on the right side, and 1+ on the left side.        Posterior tibial pulses are 1+ on the right side, and 1+ on the left side.   Pulmonary/Chest: Effort normal. She has rhonchi in the right lower field and the left lower field.   Abdominal: Soft. Normal appearance. There is no hepatosplenomegaly. There is no tenderness.   Musculoskeletal:        Right ankle: She exhibits no swelling, no ecchymosis and no deformity.        Left ankle: She exhibits no swelling, no ecchymosis and no deformity.   Lymphadenopathy:        Head (right side): No submandibular adenopathy present.        Head (left side): No submandibular adenopathy present.     She has no cervical adenopathy.   Neurological: She is alert and oriented to person, place, and time. She is not disoriented. No cranial nerve deficit.   Left sided hemiplegia.   Skin: Skin is warm, dry and intact. No rash noted. She is not diaphoretic. No cyanosis. Nails show no clubbing.   Psychiatric: She has a normal mood and affect. Her behavior is normal. Her speech is delayed.     Current Medications:     albuterol-ipratropium 2.5mg-0.5mg/3mL  3 mL Nebulization Q6H WAKE    amLODIPine  10 mg Per G Tube Daily    aspirin  81 mg Per G Tube Daily    atorvastatin  40 mg Per G Tube Daily    famotidine  20 mg Per G Tube Daily    folic acid  1 mg Per G Tube Daily    heparin (porcine)  5,000 Units Subcutaneous Q8H    hydrALAZINE  100 mg Per G Tube TID    labetalol  200 mg Oral Q12H    levETIRAcetam  500 mg Oral Q12H     Current Laboratory Results:    Recent Results (from the past 24 hour(s))   Magnesium    Collection Time: 02/22/18  3:50 AM   Result Value Ref Range    Magnesium 2.1 1.6 - 2.6 mg/dL   Phosphorus     Collection Time: 02/22/18  3:50 AM   Result Value Ref Range    Phosphorus 4.7 (H) 2.7 - 4.5 mg/dL   CBC auto differential    Collection Time: 02/22/18  3:50 AM   Result Value Ref Range    WBC 4.71 3.90 - 12.70 K/uL    RBC 2.66 (L) 4.00 - 5.40 M/uL    Hemoglobin 7.7 (L) 12.0 - 16.0 g/dL    Hematocrit 24.9 (L) 37.0 - 48.5 %    MCV 94 82 - 98 fL    MCH 28.9 27.0 - 31.0 pg    MCHC 30.9 (L) 32.0 - 36.0 g/dL    RDW 15.8 (H) 11.5 - 14.5 %    Platelets 148 (L) 150 - 350 K/uL    MPV 9.7 9.2 - 12.9 fL    Gran # (ANC) 4.3 1.8 - 7.7 K/uL    Lymph # 0.3 (L) 1.0 - 4.8 K/uL    Mono # 0.1 (L) 0.3 - 1.0 K/uL    Eos # 0.0 0.0 - 0.5 K/uL    Baso # 0.01 0.00 - 0.20 K/uL    Gran% 91.1 (H) 38.0 - 73.0 %    Lymph% 6.6 (L) 18.0 - 48.0 %    Mono% 1.9 (L) 4.0 - 15.0 %    Eosinophil% 0.0 0.0 - 8.0 %    Basophil% 0.2 0.0 - 1.9 %    Differential Method Automated    Comprehensive metabolic panel    Collection Time: 02/22/18  3:50 AM   Result Value Ref Range    Sodium 147 (H) 136 - 145 mmol/L    Potassium 4.1 3.5 - 5.1 mmol/L    Chloride 118 (H) 95 - 110 mmol/L    CO2 19 (L) 23 - 29 mmol/L    Glucose 158 (H) 70 - 110 mg/dL    BUN, Bld 63 (H) 6 - 20 mg/dL    Creatinine 2.1 (H) 0.5 - 1.4 mg/dL    Calcium 9.1 8.7 - 10.5 mg/dL    Total Protein 6.5 6.0 - 8.4 g/dL    Albumin 3.3 (L) 3.5 - 5.2 g/dL    Total Bilirubin 0.4 0.1 - 1.0 mg/dL    Alkaline Phosphatase 76 55 - 135 U/L    AST 9 (L) 10 - 40 U/L    ALT <5 (L) 10 - 44 U/L    Anion Gap 10 8 - 16 mmol/L    eGFR if African American 29 (A) >60 mL/min/1.73 m^2    eGFR if non African American 25 (A) >60 mL/min/1.73 m^2   Lipid panel    Collection Time: 02/22/18  3:50 AM   Result Value Ref Range    Cholesterol 162 120 - 199 mg/dL    Triglycerides 48 30 - 150 mg/dL    HDL 57 40 - 75 mg/dL    LDL Cholesterol 95.4 63.0 - 159.0 mg/dL    HDL/Chol Ratio 35.2 20.0 - 50.0 %    Total Cholesterol/HDL Ratio 2.8 2.0 - 5.0    Non-HDL Cholesterol 105 mg/dL   Hemoglobin A1c    Collection Time: 02/22/18  3:50 AM   Result  Value Ref Range    Hemoglobin A1C 4.8 4.0 - 5.6 %    Estimated Avg Glucose 91 68 - 131 mg/dL   Vancomycin, random    Collection Time: 02/22/18  3:50 AM   Result Value Ref Range    Vancomycin, Random 12.5 Not established ug/mL   TSH    Collection Time: 02/22/18  3:50 AM   Result Value Ref Range    TSH 0.516 0.400 - 4.000 uIU/mL     Current Imaging Results:    Imaging Results          US Lower Extremity Veins Left (Final result)  Result time 02/21/18 17:35:34    Final result by Anoop Perez MD (02/21/18 17:35:34)                 Impression:      No evidence of acute venous thrombosis of the left lower extremity.    Nonspecific edema in the left popliteal fossa.      Electronically signed by: ANOOP PEREZ MD  Date:     02/21/18  Time:    17:35              Narrative:    [id number]  02/21/18  16:20:00 [id number] ([facility]) : US LOWER EXTREMITY VEINS LEFT    SUPPLIED CLINICAL HISTORY:  Edema, eval for clot     ADDITIONAL CLINICAL HISTORY: N/A    TECHNIQUE: Duplex and color flow Doppler evaluation of the left lower extremities.    COMPARISON: None.    FINDINGS:  Left lower extremity has  no evidence of acute venous thrombosis in the common femoral, superficial femoral, greater saphenous, popliteal, peroneal, anterior tibial, and posterior tibial veins.  There is nonspecific edema in the left popliteal fossa.                             X-Ray Chest 1 View (Final result)  Result time 02/21/18 10:24:29    Final result by Donny Joseph MD (02/21/18 10:24:29)                 Impression:      Interval development of moderate right bilateral pulmonary consolidation predominantly in a central and basilar distribution suggestive of moderate pulmonary edema.  Pneumonia is considered less likely.  Poor visualization of the left hemidiaphragm and left costophrenic angle.  A small layering left-sided pleural effusion is suspected.  Prominence of the cardiac silhouette, stable.      Electronically signed by: DONNY JOSEPH MD  Date:      02/21/18  Time:    10:24              Narrative:    Comparison is made to prior examination dated 2/15/18.    A single AP radiograph of the chest was obtained.  The cardiac silhouette is prominent in size as before.  Atherosclerotic calcification is present within the aortic arch.  There is bilateral pulmonary consolidation identified predominantly within the mid to lower lungs more pronounced on the right than on the left.  Findings are suggestive of moderate pulmonary edema and this represents a detrimental change when compared to the prior examination.  Pneumonia is considered less likely but cannot be excluded.  There is poor visualization of the left hemidiaphragm.  A small layering left-sided pleural effusion is suspected.  There is no evidence for pneumothorax.  Bony structures are grossly intact.                            ECG: NSR. Normal ECG.    Date of Procedure: 02/21/2018    TEST DESCRIPTION       Aorta: The aortic root is normal in size, measuring 2.2 cm at sinotubular junction and 2.7 cm at Sinuses of Valsalva. The proximal ascending aorta is normal in size, measuring 2.5 cm across.     Left Atrium: The left atrial volume index is severely enlarged, measuring 64.27 cc/m2.     Left Ventricle: The left ventricle is normal in size, with an end-diastolic diameter of 4.5 cm, and an end-systolic diameter of 2.9 cm. Wall thickness is increased, with the septum measuring 1.3 cm and the posterior wall measuring 1.4 cm across. Relative   wall thickness was increased at 0.62, and the LV mass index was increased at 159.0 g/m2 consistent with concentric left ventricular hypertrophy. There are no regional wall motion abnormalities. Left ventricular systolic function appears normal. Visually   estimated ejection fraction is 60-65%. The LV Doppler derived stroke volume equals 114.0 ccs.     Diastolic indices: E wave velocity 1.4 m/s, E/A ratio 1.1,  msec., E/e' ratio(avg) 23. There is pseudonormalization of  mitral inflow pattern consistent with significant diastolic dysfunction.     Right Atrium: The right atrium is normal in size, measuring 5.5 cm in length and 3.9 cm in width in the apical view.     Right Ventricle: The right ventricle is normal in size. Global right ventricular systolic function appears normal. Tricuspid annular plane systolic excursion (TAPSE) is 2.2 cm. The estimated PA systolic pressure is greater than 41 mmHg.     Aortic Valve:  The aortic valve is mildly sclerotic. The peak velocity obtained across the aortic valve is 2.11 m/s, which translates to a peak gradient of 18 mmHg. The mean gradient is 9 mmHg. Using a left ventricular outflow tract diameter of 2.0 cm, a   left ventricular outflow tract velocity time integral of 35 cm, and a peak instantaneous transvalvular velocity time integral of 52 cm, the calculated aortic valve area is 2.2 cm2(AVAi is 1.25 cm2/m2).     Mitral Valve:  There is mild to moderate mitral regurgitation. There is mitral annular calcification.     Tricuspid Valve:  There is mild to moderate tricuspid regurgitation.     Pericardium: There is evidence of a small circumferential pericardial effusion.     Intracavitary: There is no evidence of intracavity mass, thrombi, or vegetation.     CONCLUSIONS     1 - Severe left atrial enlargement.     2 - Concentric hypertrophy.     3 - No wall motion abnormalities.     4 - Normal left ventricular systolic function (EF 60-65%).     5 - Impaired LV relaxation, elevated LAP (grade 2 diastolic dysfunction).     6 - Pulmonary hypertension. The estimated PA systolic pressure is greater than 41 mmHg.     7 - Mild to moderate mitral regurgitation.     8 - Small pericardial effusion.     This document has been electronically    SIGNED BY: Cuba Phillips MD On: 02/21/2018 18:42      Assessment and Plan:     Problem List:    Active Diagnoses:    Diagnosis Date Noted POA    PRINCIPAL PROBLEM:  Acute respiratory failure with hypoxia  [J96.01] 11/07/2016 Yes    Shortness of breath [R06.02] 02/21/2018 Yes    Wheezing [R06.2] 02/21/2018 Yes    Edema of left lower extremity [R60.0] 02/21/2018 Yes    Pneumonia due to infectious organism [J18.9] 02/21/2018 Yes    Anemia in stage 3 chronic kidney disease [N18.3, D63.1] 08/02/2017 Yes    Controlled type 2 diabetes mellitus with stage 3 chronic kidney disease, without long-term current use of insulin [E11.22, N18.3] 08/02/2017 Yes    History of stroke [Z86.73] 06/28/2017 Not Applicable    PEG (percutaneous endoscopic gastrostomy) status [Z93.1] 06/28/2017 Not Applicable     Chronic    Constipation [K59.00] 06/16/2017 Yes    Stage 3 chronic kidney disease [N18.3] 03/25/2013 Yes    Hyperlipidemia [E78.5] 03/05/2013 Yes    Renovascular hypertension [I15.0] 03/05/2013 Yes     Chronic      Problems Resolved During this Admission:    Diagnosis Date Noted Date Resolved POA     Assessment and Plan:    1. Heart Failure, Diastolic, Acute on Chronic   2/21/2018: Echo: Normal left ventricular size and systolic function. Moderate LVH. Moderate diastolic dysfunction. Severely dilated LA. Mild to moderate MR. Small pericardial effusion.   Diuresis.   Follow up CXR.    2. History of Cerebrovascular Accident   2016: R MCA CVA. Caused left hemiplegia.    3. Hypertension   Severe.    4. History of Sarcoid   1990s: Diagnosed.        VTE Risk Mitigation         Ordered     heparin (porcine) injection 5,000 Units  Every 8 hours     Route:  Subcutaneous        02/21/18 1252          Thank you for your consult.    I will follow-up with patient. Please contact us if you have any additional questions.    Will Zavala MD  Cardiology   [facility]

## 2018-02-23 NOTE — ASSESSMENT & PLAN NOTE
Results for SILVIACTLORRI ARASH CELAYA (MRN [id number]) as of 2/22/2018 07:16   Ref. Range 7/31/2017 16:30 11/24/2017 15:06 1/3/2018 13:32   Hemoglobin A1C Latest Ref Range: 4.0 - 5.6 % 5.4 4.8 4.7     - States is not in any DM medications.

## 2018-02-23 NOTE — PROGRESS NOTES
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  No NVD, but reports constipation last BM 3 days ago.  Feels a little better on BiPap.       AB.29 / 35 / 79 on BiPap 10/5 30%.   CXR shows new opacity on right more so versus previous, and reports suggest edema.  BNP is 546.        Hospital Course:  2-D ECHO 18:  CONCLUSIONS     1 - Severe left atrial enlargement.     2 - Concentric hypertrophy.     3 - No wall motion abnormalities.     4 - Normal left ventricular systolic function (EF 60-65%).     5 - Impaired LV relaxation, elevated LAP (grade 2 diastolic dysfunction).     6 - Pulmonary hypertension. The estimated PA systolic pressure is greater than 41 mmHg.     7 - Mild to moderate mitral regurgitation.     8 - Small pericardial effusion.       Interval History:   Doing better, breathing better.     Review of Systems   Constitutional: Negative for appetite change and fever.   Eyes: Negative for redness and visual disturbance.   Respiratory: Negative for cough and shortness of breath.    Cardiovascular: Negative for chest pain and leg swelling.   Gastrointestinal: Negative for abdominal pain, nausea and vomiting.   Endocrine: Negative for cold intolerance and heat intolerance.   Genitourinary: Negative for dysuria and menstrual problem.   Musculoskeletal:  Negative for gait problem and neck pain.   Skin: Negative.    Allergic/Immunologic: Negative for environmental allergies and food allergies.   Neurological: Positive for weakness. Negative for syncope.        Left hemiparesis     Hematological: Negative for adenopathy.   Psychiatric/Behavioral: Negative for agitation and confusion.     Objective:     Vital Signs (Most Recent):  Temp: 98.2 °F (36.8 °C) (02/23/18 0305)  Pulse: 77 (02/23/18 0717)  Resp: (!) 31 (02/23/18 0717)  BP: (!) 160/108 (02/23/18 0600)  SpO2: 100 % (02/23/18 0717) Vital Signs (24h Range):  Temp:  [98 °F (36.7 °C)-99.5 °F (37.5 °C)] 98.2 °F (36.8 °C)  Pulse:  [68-78] 77  Resp:  [16-36] 31  SpO2:  [95 %-100 %] 100 %  BP: (134-160)/() 160/108     Weight: 77.3 kg (170 lb 6.7 oz)  Body mass index is 30.19 kg/m².    Intake/Output Summary (Last 24 hours) at 02/23/18 0733  Last data filed at 02/23/18 0600   Gross per 24 hour   Intake              640 ml   Output             2170 ml   Net            -1530 ml      Physical Exam   Constitutional: She is oriented to person, place, and time. She appears well-developed and well-nourished.   HENT:   Head: Normocephalic and atraumatic.   Eyes: Conjunctivae are normal. Pupils are equal, round, and reactive to light.   Neck: Normal range of motion. Neck supple.   Cardiovascular: Normal rate, regular rhythm and normal heart sounds.    Pulmonary/Chest: Effort normal. She has rales.   Breathing easy; rales to right mid lower posterior   Abdominal: Soft. Bowel sounds are normal. She exhibits no mass. There is no guarding.   Musculoskeletal: Normal range of motion. She exhibits edema.   LLE edema Trace. No RLL edema.  Improved vs yesterday.    Neurological: She is alert and oriented to person, place, and time.   Skin: Skin is warm and dry.   Psychiatric: She has a normal mood and affect. Her behavior is normal.       Significant Labs:   BMP:   Recent Labs  Lab 02/23/18  0403   *      K 4.2   *    CO2 17*   BUN 75*   CREATININE 2.2*   CALCIUM 8.7   MG 2.0     CBC:   Recent Labs  Lab 02/21/18  0942 02/22/18  0350 02/23/18  0404   WBC 5.71 4.71 6.06   HGB 7.7* 7.7* 7.4*   HCT 25.1* 24.9* 23.0*   * 148* 146*       Significant Imaging: I have reviewed all pertinent imaging results/findings within the past 24 hours.   Imaging Results          US Lower Extremity Veins Left (Final result)  Result time 02/21/18 17:35:34    Final result by Anoop Perez MD (02/21/18 17:35:34)                 Impression:      No evidence of acute venous thrombosis of the left lower extremity.    Nonspecific edema in the left popliteal fossa.      Electronically signed by: ANOOP PEREZ MD  Date:     02/21/18  Time:    17:35              Narrative:    [id number]  02/21/18  16:20:00 [id number] ([facility]) : US LOWER EXTREMITY VEINS LEFT    SUPPLIED CLINICAL HISTORY:  Edema, eval for clot     ADDITIONAL CLINICAL HISTORY: N/A    TECHNIQUE: Duplex and color flow Doppler evaluation of the left lower extremities.    COMPARISON: None.    FINDINGS:  Left lower extremity has  no evidence of acute venous thrombosis in the common femoral, superficial femoral, greater saphenous, popliteal, peroneal, anterior tibial, and posterior tibial veins.  There is nonspecific edema in the left popliteal fossa.                             X-Ray Chest 1 View (Final result)  Result time 02/21/18 10:24:29    Final result by Donny Joseph MD (02/21/18 10:24:29)                 Impression:      Interval development of moderate right bilateral pulmonary consolidation predominantly in a central and basilar distribution suggestive of moderate pulmonary edema.  Pneumonia is considered less likely.  Poor visualization of the left hemidiaphragm and left costophrenic angle.  A small layering left-sided pleural effusion is suspected.  Prominence of the cardiac silhouette, stable.      Electronically signed by: DONNY JOSEPH MD  Date:     02/21/18  Time:    10:24               Narrative:    Comparison is made to prior examination dated 2/15/18.    A single AP radiograph of the chest was obtained.  The cardiac silhouette is prominent in size as before.  Atherosclerotic calcification is present within the aortic arch.  There is bilateral pulmonary consolidation identified predominantly within the mid to lower lungs more pronounced on the right than on the left.  Findings are suggestive of moderate pulmonary edema and this represents a detrimental change when compared to the prior examination.  Pneumonia is considered less likely but cannot be excluded.  There is poor visualization of the left hemidiaphragm.  A small layering left-sided pleural effusion is suspected.  There is no evidence for pneumothorax.  Bony structures are grossly intact.                            Assessment/Plan:      * Acute respiratory failure with hypoxia    -  states patient uses only 2 L NC oxygen at night PRN, but not always.   - History of sarcoid, but  and patient didn't seem definitely aware of this.  - She does have some wheezing, but fair air movement.   - Some JVD.    - ABG consistent with hypoxia on BiPap  - CHF consideration given CXR but also would consider infectious process.  - CXR appears improved today, LLE less edema today, would suggest diuresis helped.   - BNP elevated, but patient with HTN and advanced CKD.   - States she used to be on Lasix, but it was stopped.   - 2-D ECHO EF 60-65%, diastolic dysfunction.  - Flu swab negative.  Blood cultures pending no growth so far; at this point doesn't appear definitely infected, WBC WNL.    - Continue antibiotics.    - PCC following.  Discussed with Dr. Belle.             Pneumonia due to infectious organism    - Got Vanc, Rocephin, Zithromax in ED.   - Was on Rocephin Zithromax empirically now stopped.   - Empiric Tamiflu stopped.  - Blood cultures 2/21 pending no growth so far.  Flu swab negative.   - CXR appears a little better after  Lasix dose.   - Afebrile and WBC WNL.   - Symptoms possibly from edema.               Edema of left lower extremity    -  thinks appears more swollen.   - LLE US no evidence DVT.  - Back to baseline with diuresis.                 Wheezing    - Nebs  - One dose Solumedrol in ED  - Improving, good air movement some residual low pitched wheeze              Shortness of breath    - Appears largely due to pulmonary edema.  - Improved              Controlled type 2 diabetes mellitus with stage 3 chronic kidney disease, without long-term current use of insulin    Results for ARASH BARRIENTOS (MRN [id number]) as of 2/22/2018 07:16   Ref. Range 7/31/2017 16:30 11/24/2017 15:06 1/3/2018 13:32   Hemoglobin A1C Latest Ref Range: 4.0 - 5.6 % 5.4 4.8 4.7     - States is not in any DM medications.             Anemia in stage 3 chronic kidney disease    - Noted            History of stroke    - Residual left hemiparesis, husbands states patient uses wheelchair.   - Discussed with  importance of turning patient, and he expressed understanding.   - PT OT            PEG (percutaneous endoscopic gastrostomy) status    -  states is used for meds and patient currently eats solid food as recovered from CVA.              Constipation    - Bisacodyl supp  - Had BM.             Acute on chronic diastolic heart failure    - Diuresing with improvement in respiratory status  - Will need to be on Lasix  - Cardiology following.             Stage 3 chronic kidney disease    - Monitor            Hyperlipidemia    - Atorvastatin 40          Renovascular hypertension    - Continue home medications and monitor.   - Labetalol 200 BID.  At home takes 500 TID per MAR.   - hydralazine PRN.                 VTE Risk Mitigation         Ordered     heparin (porcine) injection 5,000 Units  Every 8 hours     Route:  Subcutaneous        02/21/18 1252              Tommy Evans MD  Department of Hospital Medicine   [facility]

## 2018-02-23 NOTE — EICU
Notified of history of THERESA    58 y/o F presented with respiratory distress due to acute diastolic HF and has responded well to diuresis.  Now off bipap.    Camera assessment: not in distress, on nasal cannula O2 96 %  She admits to having sleep apnea for which she uses a bipap at home but could not recall setting.  Bipap machine still at bedside.    · Ordered to resume Bipap 10/5 during sleep, O2 to keep sats >92%

## 2018-02-23 NOTE — ASSESSMENT & PLAN NOTE
- Diuresing with improvement in respiratory status  - Will need to be on Lasix  - Cardiology following.

## 2018-02-23 NOTE — PLAN OF CARE
Problem: Patient Care Overview  Goal: Plan of Care Review  Outcome: Ongoing (interventions implemented as appropriate)  Pt awake and alert. No complaints of pain. Nightly BiPAP initiated. Bourgeois to gravity with adequate output. No significant events throughout shift.

## 2018-02-24 LAB
ALBUMIN SERPL BCP-MCNC: 3 G/DL
ALP SERPL-CCNC: 65 U/L
ALT SERPL W/O P-5'-P-CCNC: 8 U/L
ANION GAP SERPL CALC-SCNC: 8 MMOL/L
AST SERPL-CCNC: 8 U/L
BASOPHILS # BLD AUTO: 0.02 K/UL
BASOPHILS NFR BLD: 0.4 %
BILIRUB SERPL-MCNC: 0.4 MG/DL
BNP SERPL-MCNC: 343 PG/ML
BUN SERPL-MCNC: 74 MG/DL
CALCIUM SERPL-MCNC: 8.5 MG/DL
CHLORIDE SERPL-SCNC: 116 MMOL/L
CO2 SERPL-SCNC: 20 MMOL/L
CREAT SERPL-MCNC: 2.1 MG/DL
DIFFERENTIAL METHOD: ABNORMAL
EOSINOPHIL # BLD AUTO: 0.3 K/UL
EOSINOPHIL NFR BLD: 5.8 %
ERYTHROCYTE [DISTWIDTH] IN BLOOD BY AUTOMATED COUNT: 15.5 %
EST. GFR  (AFRICAN AMERICAN): 29 ML/MIN/1.73 M^2
EST. GFR  (NON AFRICAN AMERICAN): 25 ML/MIN/1.73 M^2
GLUCOSE SERPL-MCNC: 104 MG/DL
HCT VFR BLD AUTO: 22.1 %
HGB BLD-MCNC: 6.9 G/DL
LYMPHOCYTES # BLD AUTO: 1 K/UL
LYMPHOCYTES NFR BLD: 22 %
MAGNESIUM SERPL-MCNC: 1.8 MG/DL
MCH RBC QN AUTO: 28.8 PG
MCHC RBC AUTO-ENTMCNC: 31.2 G/DL
MCV RBC AUTO: 92 FL
MONOCYTES # BLD AUTO: 0.4 K/UL
MONOCYTES NFR BLD: 8.5 %
NEUTROPHILS # BLD AUTO: 2.8 K/UL
NEUTROPHILS NFR BLD: 63.3 %
PHOSPHATE SERPL-MCNC: 3.9 MG/DL
PLATELET # BLD AUTO: 158 K/UL
PMV BLD AUTO: 10.4 FL
POTASSIUM SERPL-SCNC: 4.2 MMOL/L
PROT SERPL-MCNC: 5.9 G/DL
RBC # BLD AUTO: 2.4 M/UL
SODIUM SERPL-SCNC: 144 MMOL/L
WBC # BLD AUTO: 4.45 K/UL

## 2018-02-24 PROCEDURE — 83880 ASSAY OF NATRIURETIC PEPTIDE: CPT

## 2018-02-24 PROCEDURE — 94660 CPAP INITIATION&MGMT: CPT

## 2018-02-24 PROCEDURE — 80053 COMPREHEN METABOLIC PANEL: CPT

## 2018-02-24 PROCEDURE — 84100 ASSAY OF PHOSPHORUS: CPT

## 2018-02-24 PROCEDURE — 94640 AIRWAY INHALATION TREATMENT: CPT

## 2018-02-24 PROCEDURE — 83735 ASSAY OF MAGNESIUM: CPT

## 2018-02-24 PROCEDURE — 85025 COMPLETE CBC W/AUTO DIFF WBC: CPT

## 2018-02-24 PROCEDURE — 99232 SBSQ HOSP IP/OBS MODERATE 35: CPT | Mod: ,,, | Performed by: HOSPITALIST

## 2018-02-24 PROCEDURE — 63600175 PHARM REV CODE 636 W HCPCS: Performed by: HOSPITALIST

## 2018-02-24 PROCEDURE — 25000003 PHARM REV CODE 250: Performed by: HOSPITALIST

## 2018-02-24 PROCEDURE — 94761 N-INVAS EAR/PLS OXIMETRY MLT: CPT

## 2018-02-24 PROCEDURE — 25000242 PHARM REV CODE 250 ALT 637 W/ HCPCS: Performed by: HOSPITALIST

## 2018-02-24 PROCEDURE — 99900035 HC TECH TIME PER 15 MIN (STAT)

## 2018-02-24 PROCEDURE — 27000221 HC OXYGEN, UP TO 24 HOURS

## 2018-02-24 PROCEDURE — 36415 COLL VENOUS BLD VENIPUNCTURE: CPT

## 2018-02-24 PROCEDURE — 11000001 HC ACUTE MED/SURG PRIVATE ROOM

## 2018-02-24 RX ORDER — FUROSEMIDE 10 MG/ML
80 INJECTION INTRAMUSCULAR; INTRAVENOUS ONCE
Status: COMPLETED | OUTPATIENT
Start: 2018-02-24 | End: 2018-02-24

## 2018-02-24 RX ADMIN — LABETALOL HYDROCHLORIDE 200 MG: 200 TABLET, FILM COATED ORAL at 08:02

## 2018-02-24 RX ADMIN — HYDRALAZINE HYDROCHLORIDE 100 MG: 25 TABLET, FILM COATED ORAL at 06:02

## 2018-02-24 RX ADMIN — HYDRALAZINE HYDROCHLORIDE 100 MG: 25 TABLET, FILM COATED ORAL at 09:02

## 2018-02-24 RX ADMIN — HEPARIN SODIUM 5000 UNITS: 5000 INJECTION, SOLUTION INTRAVENOUS; SUBCUTANEOUS at 06:02

## 2018-02-24 RX ADMIN — ASPIRIN 81 MG: 81 TABLET, COATED ORAL at 08:02

## 2018-02-24 RX ADMIN — AMLODIPINE BESYLATE 10 MG: 5 TABLET ORAL at 08:02

## 2018-02-24 RX ADMIN — HEPARIN SODIUM 5000 UNITS: 5000 INJECTION, SOLUTION INTRAVENOUS; SUBCUTANEOUS at 02:02

## 2018-02-24 RX ADMIN — LEVETIRACETAM 500 MG: 500 TABLET ORAL at 08:02

## 2018-02-24 RX ADMIN — ATORVASTATIN CALCIUM 40 MG: 20 TABLET, FILM COATED ORAL at 08:02

## 2018-02-24 RX ADMIN — HEPARIN SODIUM 5000 UNITS: 5000 INJECTION, SOLUTION INTRAVENOUS; SUBCUTANEOUS at 09:02

## 2018-02-24 RX ADMIN — HYDRALAZINE HYDROCHLORIDE 100 MG: 25 TABLET, FILM COATED ORAL at 02:02

## 2018-02-24 RX ADMIN — IPRATROPIUM BROMIDE AND ALBUTEROL SULFATE 3 ML: .5; 3 SOLUTION RESPIRATORY (INHALATION) at 07:02

## 2018-02-24 RX ADMIN — FOLIC ACID 1 MG: 1 TABLET ORAL at 08:02

## 2018-02-24 RX ADMIN — IPRATROPIUM BROMIDE AND ALBUTEROL SULFATE 3 ML: .5; 3 SOLUTION RESPIRATORY (INHALATION) at 02:02

## 2018-02-24 RX ADMIN — FUROSEMIDE 80 MG: 10 INJECTION, SOLUTION INTRAVENOUS at 12:02

## 2018-02-24 RX ADMIN — FAMOTIDINE 20 MG: 20 TABLET, FILM COATED ORAL at 08:02

## 2018-02-24 NOTE — PROGRESS NOTES
[facility]  Cardiology  Progress Note    Patient Name: Lucy Perez  MRN: [id number]  Admission Date: 2/21/2018  Hospital Length of Stay: 3 days  Code Status: Prior   Attending Physician: Tommy Evans MD   Primary Care Physician: Beverly Muniz MD  Expected Discharge Date:   Principal Problem:Acute respiratory failure with hypoxia    Subjective:     Brief HPI:    58 yo female with hypertension, hypercholesterolemia and diabetes mellitus type 2. She has chronic kidney disease stage 4. 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 She says she was diagnosed with sarcoidosis in the 1990s. She presents with increasing dyspnea beginning about 2/13/2018. The shortness of breath got progressively worse and she presents to the ER on 2/21/2018he denies any chest pain. Legs were mildly edematous on presentation.     Hospital Course:     Diuresis.    Interval History:    Breathing easier. No chest pain.    Review of Systems   Cardiovascular: Negative for chest pain and leg swelling.   Respiratory: Positive for shortness of breath. Negative for cough, hemoptysis, sputum production and wheezing.      Objective:     Vital Signs (Most Recent):  Temp: 96.1 °F (35.6 °C) (02/24/18 0712)  Pulse: 72 (02/24/18 1200)  Resp: 18 (02/24/18 0728)  BP: (!) 159/73 (02/24/18 0712)  SpO2: (!) 93 % (02/24/18 0712) Vital Signs (24h Range):  Temp:  [96.1 °F (35.6 °C)-98.7 °F (37.1 °C)] 96.1 °F (35.6 °C)  Pulse:  [66-80] 72  Resp:  [12-28] 18  SpO2:  [92 %-100 %] 93 %  BP: (114-166)/(62-88) 159/73     Weight: 77.3 kg (170 lb 6.7 oz)  Body mass index is 30.19 kg/m².    SpO2: (!) 93 %  O2 Device (Oxygen Therapy): nasal cannula      Intake/Output Summary (Last 24 hours) at 02/24/18 1402  Last data filed at 02/24/18 0500   Gross per 24 hour   Intake              130 ml   Output             1410 ml   Net            -1280 ml       Lines/Drains/Airways     Drain                  Gastrostomy/Enterostomy Percutaneous endoscopic gastrostomy (PEG) LUQ -- days          Peripheral Intravenous Line                 Peripheral IV - Single Lumen 02/21/18 1210 Right Forearm 3 days                Physical Exam   Cardiovascular: Normal rate and regular rhythm.  Exam reveals gallop and S4. Exam reveals no friction rub.    Murmur heard.  High-pitched blowing holosystolic murmur is present with a grade of 2/6  at the apex  Pulmonary/Chest: She has rhonchi.   Musculoskeletal:        Right ankle: She exhibits no swelling.        Left ankle: She exhibits no swelling.   Skin: Skin is warm and dry. No rash noted. Nails show no clubbing.       Current Medications:     albuterol-ipratropium 2.5mg-0.5mg/3mL  3 mL Nebulization Q6H WAKE    amLODIPine  10 mg Per G Tube Daily    aspirin  81 mg Per G Tube Daily    atorvastatin  40 mg Per G Tube Daily    famotidine  20 mg Per G Tube Daily    folic acid  1 mg Per G Tube Daily    furosemide  80 mg Intravenous Once    heparin (porcine)  5,000 Units Subcutaneous Q8H    hydrALAZINE  100 mg Per G Tube TID    labetalol  200 mg Oral Q12H    levETIRAcetam  500 mg Oral Q12H     Current Laboratory Results:    Recent Results (from the past 24 hour(s))   Magnesium    Collection Time: 02/24/18  4:58 AM   Result Value Ref Range    Magnesium 1.8 1.6 - 2.6 mg/dL   Phosphorus    Collection Time: 02/24/18  4:58 AM   Result Value Ref Range    Phosphorus 3.9 2.7 - 4.5 mg/dL   CBC auto differential    Collection Time: 02/24/18  4:58 AM   Result Value Ref Range    WBC 4.45 3.90 - 12.70 K/uL    RBC 2.40 (L) 4.00 - 5.40 M/uL    Hemoglobin 6.9 (L) 12.0 - 16.0 g/dL    Hematocrit 22.1 (L) 37.0 - 48.5 %    MCV 92 82 - 98 fL    MCH 28.8 27.0 - 31.0 pg    MCHC 31.2 (L) 32.0 - 36.0 g/dL    RDW 15.5 (H) 11.5 - 14.5 %    Platelets 158 150 - 350 K/uL    MPV 10.4 9.2 - 12.9 fL    Gran # (ANC) 2.8 1.8 - 7.7 K/uL    Lymph # 1.0 1.0 - 4.8 K/uL    Mono # 0.4 0.3 - 1.0 K/uL    Eos # 0.3 0.0 - 0.5  K/uL    Baso # 0.02 0.00 - 0.20 K/uL    Gran% 63.3 38.0 - 73.0 %    Lymph% 22.0 18.0 - 48.0 %    Mono% 8.5 4.0 - 15.0 %    Eosinophil% 5.8 0.0 - 8.0 %    Basophil% 0.4 0.0 - 1.9 %    Differential Method Automated    Comprehensive metabolic panel    Collection Time: 02/24/18  4:58 AM   Result Value Ref Range    Sodium 144 136 - 145 mmol/L    Potassium 4.2 3.5 - 5.1 mmol/L    Chloride 116 (H) 95 - 110 mmol/L    CO2 20 (L) 23 - 29 mmol/L    Glucose 104 70 - 110 mg/dL    BUN, Bld 74 (H) 6 - 20 mg/dL    Creatinine 2.1 (H) 0.5 - 1.4 mg/dL    Calcium 8.5 (L) 8.7 - 10.5 mg/dL    Total Protein 5.9 (L) 6.0 - 8.4 g/dL    Albumin 3.0 (L) 3.5 - 5.2 g/dL    Total Bilirubin 0.4 0.1 - 1.0 mg/dL    Alkaline Phosphatase 65 55 - 135 U/L    AST 8 (L) 10 - 40 U/L    ALT 8 (L) 10 - 44 U/L    Anion Gap 8 8 - 16 mmol/L    eGFR if African American 29 (A) >60 mL/min/1.73 m^2    eGFR if non African American 25 (A) >60 mL/min/1.73 m^2   Brain natriuretic peptide    Collection Time: 02/24/18  4:58 AM   Result Value Ref Range     (H) 0 - 99 pg/mL     Current Imaging Results:    Imaging Results          US Lower Extremity Veins Left (Final result)  Result time 02/21/18 17:35:34    Final result by Anoop Perez MD (02/21/18 17:35:34)                 Impression:      No evidence of acute venous thrombosis of the left lower extremity.    Nonspecific edema in the left popliteal fossa.      Electronically signed by: ANOOP PEREZ MD  Date:     02/21/18  Time:    17:35              Narrative:    [id number]  02/21/18  16:20:00 [id number] ([facility]) : US LOWER EXTREMITY VEINS LEFT    SUPPLIED CLINICAL HISTORY:  Edema, eval for clot     ADDITIONAL CLINICAL HISTORY: N/A    TECHNIQUE: Duplex and color flow Doppler evaluation of the left lower extremities.    COMPARISON: None.    FINDINGS:  Left lower extremity has  no evidence of acute venous thrombosis in the common femoral, superficial femoral, greater saphenous, popliteal, peroneal, anterior tibial, and  posterior tibial veins.  There is nonspecific edema in the left popliteal fossa.                             X-Ray Chest 1 View (Final result)  Result time 02/21/18 10:24:29    Final result by Donny Joseph MD (02/21/18 10:24:29)                 Impression:      Interval development of moderate right bilateral pulmonary consolidation predominantly in a central and basilar distribution suggestive of moderate pulmonary edema.  Pneumonia is considered less likely.  Poor visualization of the left hemidiaphragm and left costophrenic angle.  A small layering left-sided pleural effusion is suspected.  Prominence of the cardiac silhouette, stable.      Electronically signed by: DONNY JOSEPH MD  Date:     02/21/18  Time:    10:24              Narrative:    Comparison is made to prior examination dated 2/15/18.    A single AP radiograph of the chest was obtained.  The cardiac silhouette is prominent in size as before.  Atherosclerotic calcification is present within the aortic arch.  There is bilateral pulmonary consolidation identified predominantly within the mid to lower lungs more pronounced on the right than on the left.  Findings are suggestive of moderate pulmonary edema and this represents a detrimental change when compared to the prior examination.  Pneumonia is considered less likely but cannot be excluded.  There is poor visualization of the left hemidiaphragm.  A small layering left-sided pleural effusion is suspected.  There is no evidence for pneumothorax.  Bony structures are grossly intact.                              Assessment and Plan:     Problem List:    Active Diagnoses:    Diagnosis Date Noted POA    PRINCIPAL PROBLEM:  Acute respiratory failure with hypoxia [J96.01] 11/07/2016 Yes    Hypoxia [R09.02] 02/23/2018 Yes    Shortness of breath [R06.02] 02/21/2018 Yes    Wheezing [R06.2] 02/21/2018 Yes    Edema of left lower extremity [R60.0] 02/21/2018 Yes    Pneumonia due to infectious organism  [J18.9] 02/21/2018 Yes    Anemia in stage 3 chronic kidney disease [N18.3, D63.1] 08/02/2017 Yes    Controlled type 2 diabetes mellitus with stage 3 chronic kidney disease, without long-term current use of insulin [E11.22, N18.3] 08/02/2017 Yes    History of stroke [Z86.73] 06/28/2017 Not Applicable    PEG (percutaneous endoscopic gastrostomy) status [Z93.1] 06/28/2017 Not Applicable     Chronic    Constipation [K59.00] 06/16/2017 Yes    Acute on chronic diastolic heart failure [I50.33] 11/07/2016 Yes    Stage 3 chronic kidney disease [N18.3] 03/25/2013 Yes    Hyperlipidemia [E78.5] 03/05/2013 Yes    Renovascular hypertension [I15.0] 03/05/2013 Yes     Chronic      Problems Resolved During this Admission:    Diagnosis Date Noted Date Resolved POA     Assessment and Plan:     1. Heart Failure, Diastolic, Acute on Chronic              2/21/2018: Echo: Normal left ventricular size and systolic function. Moderate LVH. Moderate diastolic dysfunction. Severely dilated LA. Mild to moderate MR. Small pericardial effusion.   2/24/2018: CXR: Still right upper infiltrate.              Continue diuresis.                 2. History of Cerebrovascular Accident              2016: R MCA CVA. Caused left hemiplegia.     3. Hypertension   On labetalol, hydralazine and amlodipine.              Severe.     4. History of Sarcoid              1990s: Diagnosed.    5. Anemia   Hct only 22%. May consider transfusion.      VTE Risk Mitigation         Ordered     heparin (porcine) injection 5,000 Units  Every 8 hours     Route:  Subcutaneous        02/21/18 [id number]          Will Zavala MD  Cardiology  [facility]

## 2018-02-24 NOTE — NURSING TRANSFER
Nursing Transfer Note      2/24/2018     Transfer To: [id number]    Transfer via stretcher    Transfer with O2, cardiac monitoring    Transported by STACEY Graham RN    Medicines sent: N/A    Chart send with patient: Yes    Notified: spouse    Patient reassessed at:2/24/18 @ 2200    Upon arrival to floor: cardiac monitor applied, patient oriented to room, call bell in reach and bed in lowest position

## 2018-02-24 NOTE — PROGRESS NOTES
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  No NVD, but reports constipation last BM 3 days ago.  Feels a little better on BiPap.       AB.29 / 35 / 79 on BiPap 10/5 30%.   CXR shows new opacity on right more so versus previous, and reports suggest edema.  BNP is 546.        Hospital Course:  2-D ECHO 18:  CONCLUSIONS     1 - Severe left atrial enlargement.     2 - Concentric hypertrophy.     3 - No wall motion abnormalities.     4 - Normal left ventricular systolic function (EF 60-65%).     5 - Impaired LV relaxation, elevated LAP (grade 2 diastolic dysfunction).     6 - Pulmonary hypertension. The estimated PA systolic pressure is greater than 41 mmHg.     7 - Mild to moderate mitral regurgitation.     8 - Small pericardial effusion.       Interval History:   Feels better like she can go home.     Review of Systems   Constitutional: Negative for appetite change and fever.   Eyes: Negative for redness and visual disturbance.   Respiratory: Negative for cough and shortness of breath.    Cardiovascular: Negative for chest pain and leg swelling.   Gastrointestinal: Negative for abdominal pain, nausea and vomiting.   Endocrine: Negative for cold intolerance and heat intolerance.   Genitourinary: Negative for dysuria and menstrual problem.   Musculoskeletal:  Negative for gait problem and neck pain.   Skin: Negative.    Allergic/Immunologic: Negative for environmental allergies and food allergies.   Neurological: Positive for weakness. Negative for syncope.        Left hemiparesis     Hematological: Negative for adenopathy.   Psychiatric/Behavioral: Negative for agitation and confusion.     Objective:     Vital Signs (Most Recent):  Temp: 96.1 °F (35.6 °C) (02/24/18 0712)  Pulse: 72 (02/24/18 0728)  Resp: 18 (02/24/18 0728)  BP: (!) 159/73 (02/24/18 0712)  SpO2: (!) 93 % (02/24/18 0712) Vital Signs (24h Range):  Temp:  [96.1 °F (35.6 °C)-98.7 °F (37.1 °C)] 96.1 °F (35.6 °C)  Pulse:  [66-80] 72  Resp:  [12-29] 18  SpO2:  [92 %-100 %] 93 %  BP: (114-166)/(62-88) 159/73     Weight: 77.3 kg (170 lb 6.7 oz)  Body mass index is 30.19 kg/m².    Intake/Output Summary (Last 24 hours) at 02/24/18 1115  Last data filed at 02/24/18 0500   Gross per 24 hour   Intake              130 ml   Output             1985 ml   Net            -1855 ml      Physical Exam   Constitutional: She is oriented to person, place, and time. She appears well-developed and well-nourished.   HENT:   Head: Normocephalic and atraumatic.   Eyes: Conjunctivae are normal. Pupils are equal, round, and reactive to light.   Neck: Normal range of motion. Neck supple.   Cardiovascular: Normal rate, regular rhythm and normal heart sounds.    Pulmonary/Chest: Effort normal. She has rales.   Breathing easy; rales to right mid lower posterior   Abdominal: Soft. Bowel sounds are normal. She exhibits no mass. There is no guarding.   Musculoskeletal: Normal range of motion. She exhibits edema.   LLE edema Trace. No RLL edema.  Improved vs yesterday.    Neurological: She is alert and oriented to person, place, and time.   Skin: Skin is warm and dry.   Psychiatric: She has a normal mood and affect. Her behavior is normal.       Significant Labs:   BMP:   Recent Labs  Lab 02/24/18  1012         K 4.2   *    CO2 20*   BUN 74*   CREATININE 2.1*   CALCIUM 8.5*   MG 1.8     Magnesium:   Recent Labs  Lab 02/23/18  0403 02/24/18  0458   MG 2.0 1.8       Significant Imaging: I have reviewed all pertinent imaging results/findings within the past 24 hours.   Imaging Results          US Lower Extremity Veins Left (Final result)  Result time 02/21/18 17:35:34    Final result by Anoop Perez MD (02/21/18 17:35:34)                 Impression:      No evidence of acute venous thrombosis of the left lower extremity.    Nonspecific edema in the left popliteal fossa.      Electronically signed by: ANOOP PEREZ MD  Date:     02/21/18  Time:    17:35              Narrative:    [id number]  02/21/18  16:20:00 [id number] ([facility]) : US LOWER EXTREMITY VEINS LEFT    SUPPLIED CLINICAL HISTORY:  Edema, eval for clot     ADDITIONAL CLINICAL HISTORY: N/A    TECHNIQUE: Duplex and color flow Doppler evaluation of the left lower extremities.    COMPARISON: None.    FINDINGS:  Left lower extremity has  no evidence of acute venous thrombosis in the common femoral, superficial femoral, greater saphenous, popliteal, peroneal, anterior tibial, and posterior tibial veins.  There is nonspecific edema in the left popliteal fossa.                             X-Ray Chest 1 View (Final result)  Result time 02/21/18 10:24:29    Final result by Donny Joseph MD (02/21/18 10:24:29)                 Impression:      Interval development of moderate right bilateral pulmonary consolidation predominantly in a central and basilar distribution suggestive of moderate pulmonary edema.  Pneumonia is considered less likely.  Poor visualization of the left hemidiaphragm and left costophrenic angle.  A small layering left-sided pleural effusion is suspected.  Prominence of the cardiac silhouette, stable.      Electronically signed by: DONNY JOSEPH MD  Date:     02/21/18  Time:    10:24              Narrative:    Comparison is made to prior examination dated 2/15/18.    A single  AP radiograph of the chest was obtained.  The cardiac silhouette is prominent in size as before.  Atherosclerotic calcification is present within the aortic arch.  There is bilateral pulmonary consolidation identified predominantly within the mid to lower lungs more pronounced on the right than on the left.  Findings are suggestive of moderate pulmonary edema and this represents a detrimental change when compared to the prior examination.  Pneumonia is considered less likely but cannot be excluded.  There is poor visualization of the left hemidiaphragm.  A small layering left-sided pleural effusion is suspected.  There is no evidence for pneumothorax.  Bony structures are grossly intact.                            Assessment/Plan:      * Acute respiratory failure with hypoxia    -  states patient uses only 2 L NC oxygen at night PRN, but not always.   - History of sarcoid, but  and patient didn't seem definitely aware of this.  - She does have some wheezing, but fair air movement.   - Some JVD.    - ABG consistent with hypoxia on BiPap  - CHF consideration given CXR but also would consider infectious process.  - CXR appears improved today, LLE less edema today, would suggest diuresis helped.   - BNP elevated, but patient with HTN and advanced CKD.   - States she used to be on Lasix, but it was stopped.   - 2-D ECHO EF 60-65%, diastolic dysfunction.  - Flu swab negative.  Blood cultures pending no growth so far; at this point doesn't appear definitely infected, WBC WNL.    - Continue antibiotics.    - PCC following.  Discussed with Dr. Belle.             Hypoxia    - States uses oxygen at home as needed.            Pneumonia due to infectious organism    - Got Vanc, Rocephin, Zithromax in ED.   - Was on Rocephin Zithromax empirically now stopped.   - Empiric Tamiflu stopped.  - Blood cultures 2/21 pending no growth so far.  Flu swab negative.   - CXR appears a little better after Lasix dose.   -  Afebrile and WBC WNL.   - Symptoms possibly from edema.               Edema of left lower extremity    -  thinks appears more swollen.   - LLE US no evidence DVT.  - Back to baseline with diuresis.                 Wheezing    - Nebs  - One dose Solumedrol in ED  - Resolved, good air movement no wheeze              Shortness of breath    - Appears largely due to pulmonary edema.  - Improved with diuresis              Controlled type 2 diabetes mellitus with stage 3 chronic kidney disease, without long-term current use of insulin    Results for ARASH BARRIENTOS (MRN [id number]) as of 2/22/2018 07:16   Ref. Range 7/31/2017 16:30 11/24/2017 15:06 1/3/2018 13:32   Hemoglobin A1C Latest Ref Range: 4.0 - 5.6 % 5.4 4.8 4.7     - States is not in any DM medications.             Anemia in stage 3 chronic kidney disease    - Noted            History of stroke    - Residual left hemiparesis, husbands states patient uses wheelchair.   - Discussed with  importance of turning patient, and he expressed understanding.   - PT OT            PEG (percutaneous endoscopic gastrostomy) status    -  states is used for meds and patient currently eats solid food as recovered from CVA.              Constipation    - Bisacodyl supp  - Had BM.             Acute on chronic diastolic heart failure    - Diuresing with improvement in respiratory status  - Will need to be on Lasix  - Cardiology following.             Stage 3 chronic kidney disease    - Monitor            Hyperlipidemia    - Atorvastatin 40          Renovascular hypertension    - Continue home medications and monitor.   - Labetalol 200 BID.  At home takes 500 TID per MAR.   - hydralazine PRN.                 VTE Risk Mitigation         Ordered     heparin (porcine) injection 5,000 Units  Every 8 hours     Route:  Subcutaneous        02/21/18 1252              Tommy Evans MD  Department of Hospital Medicine   [facility]

## 2018-02-24 NOTE — PLAN OF CARE
Problem: Patient Care Overview  Goal: Plan of Care Review  Outcome: Ongoing (interventions implemented as appropriate)  Pt resting comfortably on Bipap, tx given and tolerated well. Will continue to monitor.

## 2018-02-24 NOTE — SUBJECTIVE & OBJECTIVE
Interval History:   Feels better like she can go home.     Review of Systems   Constitutional: Negative for appetite change and fever.   Eyes: Negative for redness and visual disturbance.   Respiratory: Negative for cough and shortness of breath.    Cardiovascular: Negative for chest pain and leg swelling.   Gastrointestinal: Negative for abdominal pain, nausea and vomiting.   Endocrine: Negative for cold intolerance and heat intolerance.   Genitourinary: Negative for dysuria and menstrual problem.   Musculoskeletal: Negative for gait problem and neck pain.   Skin: Negative.    Allergic/Immunologic: Negative for environmental allergies and food allergies.   Neurological: Positive for weakness. Negative for syncope.        Left hemiparesis     Hematological: Negative for adenopathy.   Psychiatric/Behavioral: Negative for agitation and confusion.     Objective:     Vital Signs (Most Recent):  Temp: 96.1 °F (35.6 °C) (02/24/18 0712)  Pulse: 72 (02/24/18 0728)  Resp: 18 (02/24/18 0728)  BP: (!) 159/73 (02/24/18 0712)  SpO2: (!) 93 % (02/24/18 0712) Vital Signs (24h Range):  Temp:  [96.1 °F (35.6 °C)-98.7 °F (37.1 °C)] 96.1 °F (35.6 °C)  Pulse:  [66-80] 72  Resp:  [12-29] 18  SpO2:  [92 %-100 %] 93 %  BP: (114-166)/(62-88) 159/73     Weight: 77.3 kg (170 lb 6.7 oz)  Body mass index is 30.19 kg/m².    Intake/Output Summary (Last 24 hours) at 02/24/18 1115  Last data filed at 02/24/18 0500   Gross per 24 hour   Intake              130 ml   Output             1985 ml   Net            -1855 ml      Physical Exam   Constitutional: She is oriented to person, place, and time. She appears well-developed and well-nourished.   HENT:   Head: Normocephalic and atraumatic.   Eyes: Conjunctivae are normal. Pupils are equal, round, and reactive to light.   Neck: Normal range of motion. Neck supple.   Cardiovascular: Normal rate, regular rhythm and normal heart sounds.    Pulmonary/Chest: Effort normal. She has rales.   Breathing easy;  rales to right mid lower posterior   Abdominal: Soft. Bowel sounds are normal. She exhibits no mass. There is no guarding.   Musculoskeletal: Normal range of motion. She exhibits edema.   LLE edema Trace. No RLL edema.  Improved vs yesterday.    Neurological: She is alert and oriented to person, place, and time.   Skin: Skin is warm and dry.   Psychiatric: She has a normal mood and affect. Her behavior is normal.       Significant Labs:   BMP:   Recent Labs  Lab 02/24/18  0458         K 4.2   *   CO2 20*   BUN 74*   CREATININE 2.1*   CALCIUM 8.5*   MG 1.8     Magnesium:   Recent Labs  Lab 02/23/18  0403 02/24/18  0458   MG 2.0 1.8       Significant Imaging: I have reviewed all pertinent imaging results/findings within the past 24 hours.   Imaging Results          US Lower Extremity Veins Left (Final result)  Result time 02/21/18 17:35:34    Final result by Anoop Perez MD (02/21/18 17:35:34)                 Impression:      No evidence of acute venous thrombosis of the left lower extremity.    Nonspecific edema in the left popliteal fossa.      Electronically signed by: ANOOP PEREZ MD  Date:     02/21/18  Time:    17:35              Narrative:    [id number]  02/21/18  16:20:00 [id number] ([facility]) : US LOWER EXTREMITY VEINS LEFT    SUPPLIED CLINICAL HISTORY:  Edema, eval for clot     ADDITIONAL CLINICAL HISTORY: N/A    TECHNIQUE: Duplex and color flow Doppler evaluation of the left lower extremities.    COMPARISON: None.    FINDINGS:  Left lower extremity has  no evidence of acute venous thrombosis in the common femoral, superficial femoral, greater saphenous, popliteal, peroneal, anterior tibial, and posterior tibial veins.  There is nonspecific edema in the left popliteal fossa.                             X-Ray Chest 1 View (Final result)  Result time 02/21/18 10:24:29    Final result by Donny Joseph MD (02/21/18 10:24:29)                 Impression:      Interval development of moderate right  bilateral pulmonary consolidation predominantly in a central and basilar distribution suggestive of moderate pulmonary edema.  Pneumonia is considered less likely.  Poor visualization of the left hemidiaphragm and left costophrenic angle.  A small layering left-sided pleural effusion is suspected.  Prominence of the cardiac silhouette, stable.      Electronically signed by: SPEEDY FREEMAN MD  Date:     02/21/18  Time:    10:24              Narrative:    Comparison is made to prior examination dated 2/15/18.    A single AP radiograph of the chest was obtained.  The cardiac silhouette is prominent in size as before.  Atherosclerotic calcification is present within the aortic arch.  There is bilateral pulmonary consolidation identified predominantly within the mid to lower lungs more pronounced on the right than on the left.  Findings are suggestive of moderate pulmonary edema and this represents a detrimental change when compared to the prior examination.  Pneumonia is considered less likely but cannot be excluded.  There is poor visualization of the left hemidiaphragm.  A small layering left-sided pleural effusion is suspected.  There is no evidence for pneumothorax.  Bony structures are grossly intact.

## 2018-02-24 NOTE — PLAN OF CARE
Problem: Patient Care Overview  Goal: Plan of Care Review  Outcome: Ongoing (interventions implemented as appropriate)  Plan of care reviewed with patient and . VSS on O2. No pain reported during shift. Tele on and audible. Purposeful rounding performed. Bed lowered and locked, call light and personal items within reach. No needs at this time. Will continue to monitor.

## 2018-02-25 LAB
ABO + RH BLD: NORMAL
ALBUMIN SERPL BCP-MCNC: 3.1 G/DL
ALP SERPL-CCNC: 65 U/L
ALT SERPL W/O P-5'-P-CCNC: 9 U/L
ANION GAP SERPL CALC-SCNC: 11 MMOL/L
ANISOCYTOSIS BLD QL SMEAR: SLIGHT
AST SERPL-CCNC: 11 U/L
BASOPHILS NFR BLD: 0 %
BILIRUB SERPL-MCNC: 0.5 MG/DL
BLD GP AB SCN CELLS X3 SERPL QL: NORMAL
BLD PROD TYP BPU: NORMAL
BLD PROD TYP BPU: NORMAL
BLOOD UNIT EXPIRATION DATE: NORMAL
BLOOD UNIT EXPIRATION DATE: NORMAL
BLOOD UNIT TYPE CODE: 5100
BLOOD UNIT TYPE CODE: 9500
BLOOD UNIT TYPE: NORMAL
BLOOD UNIT TYPE: NORMAL
BUN SERPL-MCNC: 77 MG/DL
CALCIUM SERPL-MCNC: 8.6 MG/DL
CHLORIDE SERPL-SCNC: 114 MMOL/L
CO2 SERPL-SCNC: 18 MMOL/L
CODING SYSTEM: NORMAL
CODING SYSTEM: NORMAL
CREAT SERPL-MCNC: 2 MG/DL
DIFFERENTIAL METHOD: ABNORMAL
DISPENSE STATUS: NORMAL
DISPENSE STATUS: NORMAL
EOSINOPHIL NFR BLD: 5 %
ERYTHROCYTE [DISTWIDTH] IN BLOOD BY AUTOMATED COUNT: 15.6 %
EST. GFR  (AFRICAN AMERICAN): 31 ML/MIN/1.73 M^2
EST. GFR  (NON AFRICAN AMERICAN): 27 ML/MIN/1.73 M^2
GLUCOSE SERPL-MCNC: 133 MG/DL
HCT VFR BLD AUTO: 21.8 %
HGB BLD-MCNC: 6.7 G/DL
HYPOCHROMIA BLD QL SMEAR: ABNORMAL
LYMPHOCYTES NFR BLD: 14 %
MAGNESIUM SERPL-MCNC: 1.8 MG/DL
MCH RBC QN AUTO: 28.2 PG
MCHC RBC AUTO-ENTMCNC: 30.7 G/DL
MCV RBC AUTO: 92 FL
MONOCYTES NFR BLD: 7 %
NEUTROPHILS NFR BLD: 74 %
NUM UNITS TRANS PACKED RBC: NORMAL
OVALOCYTES BLD QL SMEAR: ABNORMAL
PHOSPHATE SERPL-MCNC: 3.6 MG/DL
PLATELET # BLD AUTO: 170 K/UL
PLATELET BLD QL SMEAR: ABNORMAL
PMV BLD AUTO: 10.4 FL
POIKILOCYTOSIS BLD QL SMEAR: SLIGHT
POLYCHROMASIA BLD QL SMEAR: ABNORMAL
POTASSIUM SERPL-SCNC: 4.5 MMOL/L
PROT SERPL-MCNC: 6.1 G/DL
RBC # BLD AUTO: 2.38 M/UL
SCHISTOCYTES BLD QL SMEAR: ABNORMAL
SCHISTOCYTES BLD QL SMEAR: PRESENT
SODIUM SERPL-SCNC: 143 MMOL/L
TOXIC GRANULES BLD QL SMEAR: PRESENT
TRANS ERYTHROCYTES VOL PATIENT: NORMAL ML
WBC # BLD AUTO: 5.5 K/UL

## 2018-02-25 PROCEDURE — 36415 COLL VENOUS BLD VENIPUNCTURE: CPT

## 2018-02-25 PROCEDURE — 36430 TRANSFUSION BLD/BLD COMPNT: CPT

## 2018-02-25 PROCEDURE — 80053 COMPREHEN METABOLIC PANEL: CPT

## 2018-02-25 PROCEDURE — P9040 RBC LEUKOREDUCED IRRADIATED: HCPCS

## 2018-02-25 PROCEDURE — 86850 RBC ANTIBODY SCREEN: CPT

## 2018-02-25 PROCEDURE — P9021 RED BLOOD CELLS UNIT: HCPCS

## 2018-02-25 PROCEDURE — 94761 N-INVAS EAR/PLS OXIMETRY MLT: CPT

## 2018-02-25 PROCEDURE — 86920 COMPATIBILITY TEST SPIN: CPT

## 2018-02-25 PROCEDURE — 94640 AIRWAY INHALATION TREATMENT: CPT

## 2018-02-25 PROCEDURE — 99232 SBSQ HOSP IP/OBS MODERATE 35: CPT | Mod: ,,, | Performed by: HOSPITALIST

## 2018-02-25 PROCEDURE — 84100 ASSAY OF PHOSPHORUS: CPT

## 2018-02-25 PROCEDURE — 25000003 PHARM REV CODE 250: Performed by: HOSPITALIST

## 2018-02-25 PROCEDURE — 85007 BL SMEAR W/DIFF WBC COUNT: CPT

## 2018-02-25 PROCEDURE — 25000003 PHARM REV CODE 250: Performed by: INTERNAL MEDICINE

## 2018-02-25 PROCEDURE — 83735 ASSAY OF MAGNESIUM: CPT

## 2018-02-25 PROCEDURE — 27000221 HC OXYGEN, UP TO 24 HOURS

## 2018-02-25 PROCEDURE — 63600175 PHARM REV CODE 636 W HCPCS: Performed by: HOSPITALIST

## 2018-02-25 PROCEDURE — 85027 COMPLETE CBC AUTOMATED: CPT

## 2018-02-25 PROCEDURE — 11000001 HC ACUTE MED/SURG PRIVATE ROOM

## 2018-02-25 PROCEDURE — 25000242 PHARM REV CODE 250 ALT 637 W/ HCPCS: Performed by: HOSPITALIST

## 2018-02-25 RX ORDER — METOPROLOL TARTRATE 50 MG/1
50 TABLET ORAL 2 TIMES DAILY
Status: DISCONTINUED | OUTPATIENT
Start: 2018-02-25 | End: 2018-02-25

## 2018-02-25 RX ORDER — FUROSEMIDE 10 MG/ML
80 INJECTION INTRAMUSCULAR; INTRAVENOUS ONCE
Status: COMPLETED | OUTPATIENT
Start: 2018-02-25 | End: 2018-02-25

## 2018-02-25 RX ORDER — BISACODYL 10 MG
10 SUPPOSITORY, RECTAL RECTAL ONCE
Status: DISCONTINUED | OUTPATIENT
Start: 2018-02-25 | End: 2018-02-27 | Stop reason: HOSPADM

## 2018-02-25 RX ORDER — SODIUM CHLORIDE 0.9 % (FLUSH) 0.9 %
5 SYRINGE (ML) INJECTION
Status: DISCONTINUED | OUTPATIENT
Start: 2018-02-25 | End: 2018-02-27 | Stop reason: HOSPADM

## 2018-02-25 RX ORDER — METOPROLOL TARTRATE 50 MG/1
50 TABLET ORAL 2 TIMES DAILY
Status: DISCONTINUED | OUTPATIENT
Start: 2018-02-25 | End: 2018-02-27 | Stop reason: HOSPADM

## 2018-02-25 RX ORDER — LEVETIRACETAM 500 MG/1
500 TABLET ORAL EVERY 12 HOURS
Status: DISCONTINUED | OUTPATIENT
Start: 2018-02-25 | End: 2018-02-27 | Stop reason: HOSPADM

## 2018-02-25 RX ORDER — HYDROCODONE BITARTRATE AND ACETAMINOPHEN 500; 5 MG/1; MG/1
TABLET ORAL
Status: DISCONTINUED | OUTPATIENT
Start: 2018-02-25 | End: 2018-02-27 | Stop reason: HOSPADM

## 2018-02-25 RX ORDER — NIFEDIPINE 30 MG/1
90 TABLET, EXTENDED RELEASE ORAL DAILY
Status: DISCONTINUED | OUTPATIENT
Start: 2018-02-26 | End: 2018-02-26

## 2018-02-25 RX ORDER — ACETAMINOPHEN 325 MG/1
650 TABLET ORAL
Status: COMPLETED | OUTPATIENT
Start: 2018-02-25 | End: 2018-02-26

## 2018-02-25 RX ORDER — DIPHENHYDRAMINE HYDROCHLORIDE 50 MG/ML
12.5 INJECTION INTRAMUSCULAR; INTRAVENOUS
Status: DISCONTINUED | OUTPATIENT
Start: 2018-02-25 | End: 2018-02-27 | Stop reason: HOSPADM

## 2018-02-25 RX ORDER — SODIUM BICARBONATE 650 MG/1
1 TABLET ORAL 2 TIMES DAILY
Status: DISCONTINUED | OUTPATIENT
Start: 2018-02-25 | End: 2018-02-27

## 2018-02-25 RX ADMIN — HEPARIN SODIUM 5000 UNITS: 5000 INJECTION, SOLUTION INTRAVENOUS; SUBCUTANEOUS at 09:02

## 2018-02-25 RX ADMIN — FUROSEMIDE 80 MG: 10 INJECTION, SOLUTION INTRAVENOUS at 11:02

## 2018-02-25 RX ADMIN — AMLODIPINE BESYLATE 10 MG: 5 TABLET ORAL at 10:02

## 2018-02-25 RX ADMIN — IPRATROPIUM BROMIDE AND ALBUTEROL SULFATE 3 ML: .5; 3 SOLUTION RESPIRATORY (INHALATION) at 07:02

## 2018-02-25 RX ADMIN — LEVETIRACETAM 500 MG: 500 TABLET ORAL at 10:02

## 2018-02-25 RX ADMIN — METOPROLOL TARTRATE 50 MG: 50 TABLET ORAL at 09:02

## 2018-02-25 RX ADMIN — LEVETIRACETAM 500 MG: 500 TABLET, FILM COATED ORAL at 09:02

## 2018-02-25 RX ADMIN — FOLIC ACID 1 MG: 1 TABLET ORAL at 10:02

## 2018-02-25 RX ADMIN — IPRATROPIUM BROMIDE AND ALBUTEROL SULFATE 3 ML: .5; 3 SOLUTION RESPIRATORY (INHALATION) at 01:02

## 2018-02-25 RX ADMIN — SODIUM BICARBONATE 650 MG TABLET 650 MG: at 09:02

## 2018-02-25 RX ADMIN — ATORVASTATIN CALCIUM 40 MG: 20 TABLET, FILM COATED ORAL at 10:02

## 2018-02-25 RX ADMIN — HYDRALAZINE HYDROCHLORIDE 100 MG: 25 TABLET, FILM COATED ORAL at 09:02

## 2018-02-25 RX ADMIN — HYDRALAZINE HYDROCHLORIDE 100 MG: 25 TABLET, FILM COATED ORAL at 05:02

## 2018-02-25 RX ADMIN — HEPARIN SODIUM 5000 UNITS: 5000 INJECTION, SOLUTION INTRAVENOUS; SUBCUTANEOUS at 03:02

## 2018-02-25 RX ADMIN — FAMOTIDINE 20 MG: 20 TABLET, FILM COATED ORAL at 10:02

## 2018-02-25 RX ADMIN — HYDRALAZINE HYDROCHLORIDE 100 MG: 25 TABLET, FILM COATED ORAL at 02:02

## 2018-02-25 RX ADMIN — IPRATROPIUM BROMIDE AND ALBUTEROL SULFATE 3 ML: .5; 3 SOLUTION RESPIRATORY (INHALATION) at 08:02

## 2018-02-25 RX ADMIN — LABETALOL HYDROCHLORIDE 200 MG: 200 TABLET, FILM COATED ORAL at 10:02

## 2018-02-25 RX ADMIN — HEPARIN SODIUM 5000 UNITS: 5000 INJECTION, SOLUTION INTRAVENOUS; SUBCUTANEOUS at 05:02

## 2018-02-25 RX ADMIN — ASPIRIN 81 MG: 81 TABLET, COATED ORAL at 10:02

## 2018-02-25 NOTE — PROGRESS NOTES
[facility]  Cardiology  Progress Note    Patient Name: Lucy Perez  MRN: [id number]  Admission Date: 2/21/2018  Hospital Length of Stay: 4 days  Code Status: Prior   Attending Physician: Tommy Evans MD   Primary Care Physician: Beverly Muniz MD  Expected Discharge Date:   Principal Problem:Acute respiratory failure with hypoxia    Subjective:     Brief HPI:    58 yo female with hypertension, hypercholesterolemia and diabetes mellitus type 2. She has chronic kidney disease stage 4. 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 She says she was diagnosed with sarcoidosis in the 1990s. She presents with increasing dyspnea beginning about 2/13/2018. The shortness of breath got progressively worse and she presents to the ER on 2/21/2018 he denies any chest pain. Legs were mildly edematous on presentation.     Hospital Course:     Diuresis.    2/25/2018: Receiving two units of PRBCs.    Interval History:    Breathing easier. No chest pain.    Review of Systems   Cardiovascular: Negative for chest pain and leg swelling.   Respiratory: Positive for shortness of breath. Negative for cough, hemoptysis, sputum production and wheezing.      Objective:     Vital Signs (Most Recent):  Temp: 97.9 °F (36.6 °C) (02/25/18 1209)  Pulse: 73 (02/25/18 1209)  Resp: 20 (02/25/18 1209)  BP: (!) 172/74 (02/25/18 1209)  SpO2: 99 % (02/25/18 1209) Vital Signs (24h Range):  Temp:  [97.4 °F (36.3 °C)-99 °F (37.2 °C)] 97.9 °F (36.6 °C)  Pulse:  [71-81] 73  Resp:  [16-20] 20  SpO2:  [91 %-100 %] 99 %  BP: (135-179)/(63-77) 172/74     Weight: 77.3 kg (170 lb 6.7 oz)  Body mass index is 30.19 kg/m².    SpO2: 99 %  O2 Device (Oxygen Therapy): nasal cannula      Intake/Output Summary (Last 24 hours) at 02/25/18 1320  Last data filed at 02/25/18 1145   Gross per 24 hour   Intake           266.67 ml   Output                0 ml   Net           266.67 ml       Lines/Drains/Airways      Drain                 Gastrostomy/Enterostomy Percutaneous endoscopic gastrostomy (PEG) LUQ -- days          Peripheral Intravenous Line                 Peripheral IV - Single Lumen 02/21/18 1210 Right Forearm 4 days                Physical Exam   Cardiovascular: Normal rate, regular rhythm, S1 normal and S2 normal.  Exam reveals gallop and S4. Exam reveals no S3 and no friction rub.    Murmur heard.  High-pitched blowing holosystolic murmur is present with a grade of 2/6  at the apex  Pulmonary/Chest: She has rhonchi.   Abdominal: Soft. Normal appearance. There is no tenderness.   Musculoskeletal:        Right ankle: She exhibits no swelling.        Left ankle: She exhibits no swelling.   Skin: Skin is warm and dry. No rash noted. Nails show no clubbing.       Current Medications:     albuterol-ipratropium 2.5mg-0.5mg/3mL  3 mL Nebulization Q6H WAKE    amLODIPine  10 mg Per G Tube Daily    aspirin  81 mg Per G Tube Daily    atorvastatin  40 mg Per G Tube Daily    bisacodyl  10 mg Rectal Once    famotidine  20 mg Per G Tube Daily    folic acid  1 mg Per G Tube Daily    heparin (porcine)  5,000 Units Subcutaneous Q8H    hydrALAZINE  100 mg Per G Tube TID    levETIRAcetam  500 mg Per G Tube Q12H    metoprolol tartrate  50 mg Per G Tube BID     Current Laboratory Results:    Recent Results (from the past 24 hour(s))   Magnesium    Collection Time: 02/25/18  5:15 AM   Result Value Ref Range    Magnesium 1.8 1.6 - 2.6 mg/dL   Phosphorus    Collection Time: 02/25/18  5:15 AM   Result Value Ref Range    Phosphorus 3.6 2.7 - 4.5 mg/dL   CBC auto differential    Collection Time: 02/25/18  5:15 AM   Result Value Ref Range    WBC 5.50 3.90 - 12.70 K/uL    RBC 2.38 (L) 4.00 - 5.40 M/uL    Hemoglobin 6.7 (L) 12.0 - 16.0 g/dL    Hematocrit 21.8 (L) 37.0 - 48.5 %    MCV 92 82 - 98 fL    MCH 28.2 27.0 - 31.0 pg    MCHC 30.7 (L) 32.0 - 36.0 g/dL    RDW 15.6 (H) 11.5 - 14.5 %    Platelets 170 150 - 350 K/uL    MPV 10.4  9.2 - 12.9 fL    Gran% 74.0 (H) 38.0 - 73.0 %    Lymph% 14.0 (L) 18.0 - 48.0 %    Mono% 7.0 4.0 - 15.0 %    Eosinophil% 5.0 0.0 - 8.0 %    Basophil% 0.0 0.0 - 1.9 %    Platelet Estimate Appears normal     Aniso Slight     Poik Slight     Poly Occasional     Hypo Occasional     Ovalocytes Occasional     Schistocytes Present     Toxic Granulation Present     Fragmented Cells Occasional     Differential Method Manual    Comprehensive metabolic panel    Collection Time: 02/25/18  5:15 AM   Result Value Ref Range    Sodium 143 136 - 145 mmol/L    Potassium 4.5 3.5 - 5.1 mmol/L    Chloride 114 (H) 95 - 110 mmol/L    CO2 18 (L) 23 - 29 mmol/L    Glucose 133 (H) 70 - 110 mg/dL    BUN, Bld 77 (H) 6 - 20 mg/dL    Creatinine 2.0 (H) 0.5 - 1.4 mg/dL    Calcium 8.6 (L) 8.7 - 10.5 mg/dL    Total Protein 6.1 6.0 - 8.4 g/dL    Albumin 3.1 (L) 3.5 - 5.2 g/dL    Total Bilirubin 0.5 0.1 - 1.0 mg/dL    Alkaline Phosphatase 65 55 - 135 U/L    AST 11 10 - 40 U/L    ALT 9 (L) 10 - 44 U/L    Anion Gap 11 8 - 16 mmol/L    eGFR if African American 31 (A) >60 mL/min/1.73 m^2    eGFR if non African American 27 (A) >60 mL/min/1.73 m^2   Type & Screen    Collection Time: 02/25/18  7:53 AM   Result Value Ref Range    Group & Rh O POS     Indirect Cha NEG    Prepare RBC 2 Units; Anemia    Collection Time: 02/25/18  7:53 AM   Result Value Ref Range    UNIT NUMBER [id number]     PRODUCT CODE X9402J41     DISPENSE STATUS ISSUED     CODING SYSTEM [id number]     Unit Blood Type Code 9500     Unit Blood Type O NEG     Unit Expiration [id number]     UNIT NUMBER [id number]     PRODUCT CODE T9885A88     DISPENSE STATUS ISSUED     CODING SYSTEM XNRO237     Unit Blood Type Code 5100     Unit Blood Type O POS     Unit Expiration [id number]      Current Imaging Results:    Imaging Results          US Lower Extremity Veins Left (Final result)  Result time 02/21/18 17:35:34    Final result by Anoop Perez MD (02/21/18 17:35:34)                  Impression:      No evidence of acute venous thrombosis of the left lower extremity.    Nonspecific edema in the left popliteal fossa.      Electronically signed by: ASHLEY GONZALEZ MD  Date:     02/21/18  Time:    17:35              Narrative:    [id number]  02/21/18  16:20:00 [id number] ([facility]) : US LOWER EXTREMITY VEINS LEFT    SUPPLIED CLINICAL HISTORY:  Edema, eval for clot     ADDITIONAL CLINICAL HISTORY: N/A    TECHNIQUE: Duplex and color flow Doppler evaluation of the left lower extremities.    COMPARISON: None.    FINDINGS:  Left lower extremity has  no evidence of acute venous thrombosis in the common femoral, superficial femoral, greater saphenous, popliteal, peroneal, anterior tibial, and posterior tibial veins.  There is nonspecific edema in the left popliteal fossa.                             X-Ray Chest 1 View (Final result)  Result time 02/21/18 10:24:29    Final result by Donny Joseph MD (02/21/18 10:24:29)                 Impression:      Interval development of moderate right bilateral pulmonary consolidation predominantly in a central and basilar distribution suggestive of moderate pulmonary edema.  Pneumonia is considered less likely.  Poor visualization of the left hemidiaphragm and left costophrenic angle.  A small layering left-sided pleural effusion is suspected.  Prominence of the cardiac silhouette, stable.      Electronically signed by: DONNY JOSEPH MD  Date:     02/21/18  Time:    10:24              Narrative:    Comparison is made to prior examination dated 2/15/18.    A single AP radiograph of the chest was obtained.  The cardiac silhouette is prominent in size as before.  Atherosclerotic calcification is present within the aortic arch.  There is bilateral pulmonary consolidation identified predominantly within the mid to lower lungs more pronounced on the right than on the left.  Findings are suggestive of moderate pulmonary edema and this represents a detrimental change when compared to  the prior examination.  Pneumonia is considered less likely but cannot be excluded.  There is poor visualization of the left hemidiaphragm.  A small layering left-sided pleural effusion is suspected.  There is no evidence for pneumothorax.  Bony structures are grossly intact.                              Assessment and Plan:     Problem List:    Active Diagnoses:    Diagnosis Date Noted POA    PRINCIPAL PROBLEM:  Acute respiratory failure with hypoxia [J96.01] 11/07/2016 Yes    Hypoxia [R09.02] 02/23/2018 Yes    Shortness of breath [R06.02] 02/21/2018 Yes    Wheezing [R06.2] 02/21/2018 Yes    Edema of left lower extremity [R60.0] 02/21/2018 Yes    Pneumonia due to infectious organism [J18.9] 02/21/2018 Yes    Anemia in stage 3 chronic kidney disease [N18.3, D63.1] 08/02/2017 Yes    Controlled type 2 diabetes mellitus with stage 3 chronic kidney disease, without long-term current use of insulin [E11.22, N18.3] 08/02/2017 Yes    History of stroke [Z86.73] 06/28/2017 Not Applicable    PEG (percutaneous endoscopic gastrostomy) status [Z93.1] 06/28/2017 Not Applicable     Chronic    Constipation [K59.00] 06/16/2017 Yes    Acute on chronic diastolic heart failure [I50.33] 11/07/2016 Yes    Stage 3 chronic kidney disease [N18.3] 03/25/2013 Yes    Hyperlipidemia [E78.5] 03/05/2013 Yes    Renovascular hypertension [I15.0] 03/05/2013 Yes     Chronic      Problems Resolved During this Admission:    Diagnosis Date Noted Date Resolved POA     Assessment and Plan:     1. Heart Failure, Diastolic, Acute on Chronic              2/21/2018: Echo: Normal left ventricular size and systolic function. Moderate LVH. Moderate diastolic dysfunction. Severely dilated LA. Mild to moderate MR. Small pericardial effusion.   2/24/2018: CXR: Still right upper infiltrate.   Continue diuresis.                 2. History of Cerebrovascular Accident              2016: R MCA CVA. Caused left hemiplegia.     3. Hypertension   On  labetalol, hydralazine and amlodipine.              Severe.     4. History of Sarcoid              1990s: Diagnosed.    5. Anemia   Hct only 22%.    Receiving PRBCs.   May benefit from erythropoietin.      VTE Risk Mitigation         Ordered     heparin (porcine) injection 5,000 Units  Every 8 hours     Route:  Subcutaneous        02/21/18 8661          Will Zavala MD  Cardiology  [facility]

## 2018-02-25 NOTE — PROGRESS NOTES
[facility] Hospital Medicine  Progress Note    Patient Name: Lucy Perez  MRN: [id number]  Patient Class: IP- Inpatient   Admission Date: 2018  Length of Stay: 4 days  Attending Physician: Tommy Evans MD  Primary Care Provider: Beverly Muniz MD        Subjective:     Principal Problem:Acute respiratory failure with hypoxia    HPI:  58 yo female with PMH CVA secondary to bleed with residual left hemiparesis, sarcoidosis ( and patient seem unsure of this diagnosis), asthma, HTN, DM-2, was in her usual state of health at home until about 7 days ago when she began to experience progressive symptoms of SOB, wheezing, no cough, no chest pain, no fever.  No NVD, but reports constipation last BM 3 days ago.  Feels a little better on BiPap.       AB.29 / 35 / 79 on BiPap 10/5 30%.   CXR shows new opacity on right more so versus previous, and reports suggest edema.  BNP is 546.        Hospital Course:  2-D ECHO 18:  CONCLUSIONS     1 - Severe left atrial enlargement.     2 - Concentric hypertrophy.     3 - No wall motion abnormalities.     4 - Normal left ventricular systolic function (EF 60-65%).     5 - Impaired LV relaxation, elevated LAP (grade 2 diastolic dysfunction).     6 - Pulmonary hypertension. The estimated PA systolic pressure is greater than 41 mmHg.     7 - Mild to moderate mitral regurgitation.     8 - Small pericardial effusion.       Interval History:  Getting PRBC 2 units.  Feels SOB this AM.  SaO2 = 100% on 3 L NC.       Review of Systems   Constitutional: Negative for appetite change and fever.   Eyes: Negative for redness and visual disturbance.   Respiratory: Positive for shortness of breath. Negative for cough.    Cardiovascular: Negative for chest pain and leg swelling.   Gastrointestinal: Negative for abdominal pain, nausea and vomiting.   Endocrine: Negative for cold intolerance and heat intolerance.   Genitourinary: Negative for dysuria and  menstrual problem.   Musculoskeletal: Negative for gait problem and neck pain.   Skin: Negative.    Allergic/Immunologic: Negative for environmental allergies and food allergies.   Neurological: Positive for weakness. Negative for syncope.        Left hemiparesis     Hematological: Negative for adenopathy.   Psychiatric/Behavioral: Negative for agitation and confusion.     Objective:     Vital Signs (Most Recent):  Temp: 97.9 °F (36.6 °C) (02/25/18 1015)  Pulse: 78 (02/25/18 1015)  Resp: 20 (02/25/18 1015)  BP: (!) 165/72 (02/25/18 1015)  SpO2: 100 % (02/25/18 1015) Vital Signs (24h Range):  Temp:  [97.4 °F (36.3 °C)-99 °F (37.2 °C)] 97.9 °F (36.6 °C)  Pulse:  [71-78] 78  Resp:  [16-20] 20  SpO2:  [91 %-100 %] 100 %  BP: (135-175)/(63-77) 165/72     Weight: 77.3 kg (170 lb 6.7 oz)  Body mass index is 30.19 kg/m².  No intake or output data in the 24 hours ending 02/25/18 1101   Physical Exam   Constitutional: She is oriented to person, place, and time. She appears well-developed and well-nourished.   HENT:   Head: Normocephalic and atraumatic.   Eyes: Conjunctivae are normal. Pupils are equal, round, and reactive to light.   Neck: Normal range of motion. Neck supple.   Cardiovascular: Normal rate, regular rhythm and normal heart sounds.    Pulmonary/Chest: Effort normal. She has rales.   Breathing easy; rales to right mid lower posterior;   Abdominal: Soft. Bowel sounds are normal. She exhibits no mass. There is no guarding.   Musculoskeletal: Normal range of motion. She exhibits edema.   LLE edema Trace. No RLL edema.  Improved vs admit.    Neurological: She is alert and oriented to person, place, and time.   Skin: Skin is warm and dry.   Psychiatric: She has a normal mood and affect. Her behavior is normal.       Significant Labs:   Blood Culture: No results for input(s): LABBLOO in the last 48 hours.  CBC:   Recent Labs  Lab 02/24/18  0458 02/25/18  0515   WBC 4.45 5.50   HGB 6.9* 6.7*   HCT 22.1* 21.8*     170     CMP:   Recent Labs  Lab 02/24/18  0458 02/25/18  0515    143   K 4.2 4.5   * 114*   CO2 20* 18*    133*   BUN 74* 77*   CREATININE 2.1* 2.0*   CALCIUM 8.5* 8.6*   PROT 5.9* 6.1   ALBUMIN 3.0* 3.1*   BILITOT 0.4 0.5   ALKPHOS 65 65   AST 8* 11   ALT 8* 9*   ANIONGAP 8 11   EGFRNONAA 25* 27*       Significant Imaging: I have reviewed all pertinent imaging results/findings within the past 24 hours.   Imaging Results          US Lower Extremity Veins Left (Final result)  Result time 02/21/18 17:35:34    Final result by Anoop Perez MD (02/21/18 17:35:34)                 Impression:      No evidence of acute venous thrombosis of the left lower extremity.    Nonspecific edema in the left popliteal fossa.      Electronically signed by: ANOOP PEREZ MD  Date:     02/21/18  Time:    17:35              Narrative:    [id number]  02/21/18  16:20:00 [id number] ([facility]) : US LOWER EXTREMITY VEINS LEFT    SUPPLIED CLINICAL HISTORY:  Edema, eval for clot     ADDITIONAL CLINICAL HISTORY: N/A    TECHNIQUE: Duplex and color flow Doppler evaluation of the left lower extremities.    COMPARISON: None.    FINDINGS:  Left lower extremity has  no evidence of acute venous thrombosis in the common femoral, superficial femoral, greater saphenous, popliteal, peroneal, anterior tibial, and posterior tibial veins.  There is nonspecific edema in the left popliteal fossa.                             X-Ray Chest 1 View (Final result)  Result time 02/21/18 10:24:29    Final result by Donny Joseph MD (02/21/18 10:24:29)                 Impression:      Interval development of moderate right bilateral pulmonary consolidation predominantly in a central and basilar distribution suggestive of moderate pulmonary edema.  Pneumonia is considered less likely.  Poor visualization of the left hemidiaphragm and left costophrenic angle.  A small layering left-sided pleural effusion is suspected.  Prominence of the cardiac silhouette,  stable.      Electronically signed by: SPEEDY FREEMAN MD  Date:     02/21/18  Time:    10:24              Narrative:    Comparison is made to prior examination dated 2/15/18.    A single AP radiograph of the chest was obtained.  The cardiac silhouette is prominent in size as before.  Atherosclerotic calcification is present within the aortic arch.  There is bilateral pulmonary consolidation identified predominantly within the mid to lower lungs more pronounced on the right than on the left.  Findings are suggestive of moderate pulmonary edema and this represents a detrimental change when compared to the prior examination.  Pneumonia is considered less likely but cannot be excluded.  There is poor visualization of the left hemidiaphragm.  A small layering left-sided pleural effusion is suspected.  There is no evidence for pneumothorax.  Bony structures are grossly intact.                            Assessment/Plan:      * Acute respiratory failure with hypoxia    -  states patient uses only 2 L NC oxygen at night PRN, but not always.   - History of sarcoid, but  and patient didn't seem definitely aware of this.  - She does have some wheezing, but fair air movement.   - Some JVD.    - ABG consistent with hypoxia on BiPap  - CHF consideration given CXR but also would consider infectious process.  - CXR appears improved today, LLE less edema today, would suggest diuresis helped.   - BNP elevated, but patient with HTN and advanced CKD.   - States she used to be on Lasix, but it was stopped.   - 2-D ECHO EF 60-65%, diastolic dysfunction.  - Flu swab negative.  Blood cultures pending no growth so far; at this point doesn't appear definitely infected, WBC WNL.    - Continue antibiotics.    - PCC following.  Discussed with Dr. Belle.             Hypoxia    - States uses oxygen at home as needed.            Pneumonia due to infectious organism    - Got Vanc, Rocephin, Zithromax in ED.   - Was on Rocephin  Zithromax empirically now stopped.   - Empiric Tamiflu stopped.  - Blood cultures 2/21 pending no growth so far.  Flu swab negative.   - CXR appears a little better after Lasix dose.   - Afebrile and WBC WNL.   - Symptoms possibly from edema.  - Was followed by PCC.           Edema of left lower extremity    -  thinks appears more swollen.   - LLE US no evidence DVT.  - Back to baseline with diuresis.                 Wheezing    - Nebs q 6  - One dose Solumedrol in ED  - Resolved, good air movement no wheeze              Shortness of breath    - Appears largely due to pulmonary edema.  - Improved with diuresis  - Today patient feels SOB again.  She has rales notable on right side.  SaO2 100% on 3 L NC.   - Repeat CXR.  - Getting Lasix 80 dose after 1st PRBC.            Controlled type 2 diabetes mellitus with stage 3 chronic kidney disease, without long-term current use of insulin    Results for ARASH BARRIENTOS (MRN [id number]) as of 2/22/2018 07:16   Ref. Range 7/31/2017 16:30 11/24/2017 15:06 1/3/2018 13:32   Hemoglobin A1C Latest Ref Range: 4.0 - 5.6 % 5.4 4.8 4.7     - States is not in any DM medications.             Anemia in stage 3 chronic kidney disease    - Noted            History of stroke    - Residual left hemiparesis, husbands states patient uses wheelchair.   - Discussed with  importance of turning patient, and he expressed understanding.   - PT OT            PEG (percutaneous endoscopic gastrostomy) status    -  states is used for meds and patient currently eats solid food as recovered from CVA.              Constipation    - Bisacodyl supp  - Had BM.             Acute on chronic diastolic heart failure    - Diuresed with initial improvement in respiratory status  - Will need to be on Lasix outpatient.  - Giving IV Lasix 80 daily so far.  Had improvement but today with SOB.   - Cardiology following.             Stage 3 chronic kidney disease    - Monitor             Hyperlipidemia    - Atorvastatin 40          Renovascular hypertension    - Amlodipine 10.   - Hydralazine 100 TID.  - Labetalol 200 BID.  At home takes 500 TID per MAR.  DC labetalol as has asthma.    - Start Metoprolol 50 BID.   - hydralazine PRN.   - Nephrology to see today.             VTE Risk Mitigation         Ordered     heparin (porcine) injection 5,000 Units  Every 8 hours     Route:  Subcutaneous        02/21/18 1152              Tommy Evans MD  Department of Hospital Medicine   [facility]

## 2018-02-25 NOTE — CONSULTS
"Consult Note  Nephrology    Consult Requested By: Tommy Evans MD  Reason for Consult: DARLING on CKD stage 3    SUBJECTIVE:     History of Present Illness:  Patient is a 57 y.o. female presents with hx of 2 weeks of worsening SOB and LE edema/ abdominal distention. She denies any history of medication changes, but on review of her diet she clearly eats more Na than she should. It appears that she mistakenly thinks because foods are "chicken or turkey" that they do not contin salt. She has successfully undergone diuresis and appears clinically euvolemic at this time. [place] [facility] has not seem him in some time last visit appears to have been 2/2016. Her baseline Cr appears to be ~ 1.7 and is 2.0 today.    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    Cortical cataract of both eyes 7/26/2016    Degeneration of lumbar or lumbosacral intervertebral disc 3/5/2013    S/p MRI L-spine 5/2009     Depression     General anesthetics causing adverse effect in therapeutic use     Hemorrhagic cerebrovascular accident (CVA)     8/2016 s/p Hemicraniotomy at [facility] with Left hemiparesis    Hemorrhagic cerebrovascular accident (CVA) 1/3/2018    History of stroke 6/28/2017    s/p R-MCA stroke with R-putaminal hemorrhagic transformation in 8/2016 and 11/2016 (s/p hemicraniotomy at [facility]) with residual L hemiparesis, on AED s/p CVA      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    Hyperlipidemia     Hypertensive retinopathy of both eyes 7/26/2016    Impingement syndrome of right shoulder 7/2/2014    Left ventricular diastolic dysfunction with preserved systolic function 12/15/2015    Mixed anxiety and " depressive disorder 3/5/2013    Obesity     THERESA (obstructive sleep apnea) 3/5/2013    No Home CPAP 2ndary to cost     Partial symptomatic epilepsy with complex partial seizures, not intractable, without status epilepticus     PEG (percutaneous endoscopic gastrostomy) status 6/28/2017    Renovascular hypertension 3/5/2013    Will resume home medications: amlodipine, labetalol, and hydralazine Will hold Lisinopril in setting of DARLING.   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 without macular edema, without long-term current use of insulin 8/2/2017    Type 2 diabetes mellitus with diabetic polyneuropathy, without long-term current use of insulin 10/22/2015    Type 2 diabetes mellitus with stage 3 chronic kidney disease, without long-term current use of insulin 10/22/2015    Vertebral artery stenosis 3/5/2013    S/p Stenting per Dr Burnett      Past Surgical History:   Procedure Laterality Date    BREAST SURGERY      breast reduction    COLONOSCOPY N/A 8/11/2016    Procedure: COLONOSCOPY;  Surgeon: Jerry Vilchis MD;  Location: [facility] ([address] [place] [address]);  Service: Endoscopy;  Laterality: N/A;  Patient reports difficulty awaking from anesthesia in the past.    HYSTERECTOMY      ROTATOR CUFF REPAIR Right July 9, 2014    right side    Skull surgery      Aneurysm    stent placed      in vertebral artery    TUBAL LIGATION       Family History   Problem Relation Age of Onset    Cancer Mother 55     Breast cancer    Diabetes Mother     Hypertension Mother     Heart disease Mother     Heart attack Mother     Stroke Sister     Hypertension Sister     Sleep apnea Sister     No Known  "Problems Daughter     No Known Problems Son     Bell's palsy Sister     Lupus Sister     Blindness Maternal Grandmother     Diabetes       "My entire family family has diabetes"    Stroke Maternal Aunt     Amblyopia Neg Hx     Cataracts Neg Hx     Glaucoma Neg Hx     Macular degeneration Neg Hx     Retinal detachment Neg Hx     Strabismus Neg Hx      Social History   Substance Use Topics    Smoking status: Never Smoker    Smokeless tobacco: Never Used    Alcohol use No      Comment: occasional wine cooler        Facility-Administered Medications Prior to Admission   Medication Dose Route Frequency Provider Last Rate Last Dose    onabotulinumtoxina injection 300 Units  300 Units Intramuscular 1 time in Clinic/HOD Monty Pardo MD         Prescriptions Prior to Admission   Medication Sig Dispense Refill Last Dose    albuterol (PROVENTIL) 2.5 mg /3 mL (0.083 %) nebulizer solution Take 3 mLs (2.5 mg total) by nebulization every 6 (six) hours as needed for Wheezing. Rescue 25 each 3 prn    amlodipine (NORVASC) 10 MG tablet Take 1 tablet (10 mg total) by mouth once daily. 90 tablet 2 2/21/2018    aspirin (ECOTRIN) 81 MG EC tablet Take 81 mg by mouth once daily.     2/20/2018    atorvastatin (LIPITOR) 40 MG tablet TAKE 1 TABLET ONE TIME DAILY FOR CHOLESTEROL 90 tablet 2 2/20/2018    BD INSULIN PEN NEEDLE UF SHORT 31 gauge x 5/16" Ndle USE TO INJECT NOVOLOG FLEXPEN BEFORE MEALS 150 each 11 prn    blood sugar diagnostic (ACCU-CHEK SMARTVIEW TEST STRIP) Strp 1 strip by Misc.(Non-Drug; Combo Route) route 2 (two) times daily. 300 each 3 Taking    dantrolene (DANTRIUM) 25 MG Cap 3 caps 3x/day 180 capsule 0 2/20/2018    famotidine (PEPCID) 20 MG tablet TAKE 1 TABLET (20 MG TOTAL) BY MOUTH ONCE DAILY. 90 tablet 3 2/20/2018    ferrous sulfate 220 mg (44 mg iron)/5 mL solution 10ml daily for Iron/Give via  mL 6 2/20/2018    hydrALAZINE (APRESOLINE) 100 MG tablet Take 1 tablet (100 mg total) by " mouth 3 (three) times daily. 270 tablet 3 2/21/2018    hydroCHLOROthiazide (HYDRODIURIL) 25 MG tablet Take 1 tablet (25 mg total) by mouth once daily. 30 tablet 3 2/21/2018    labetalol (NORMODYNE) 100 MG tablet Take 5 tablets (500 mg total) by mouth every 8 (eight) hours. 450 tablet 6 2/21/2018    levetiracetam (KEPPRA) 500 MG Tab Take 1 tablet (500 mg total) by mouth every 8 (eight) hours. 90 tablet 0 2/21/2018    multivitamin with iron Tab 1/day (Patient taking differently: Take 1 tablet by mouth once daily. ) 30 each prn 2/20/2018    pregabalin (LYRICA) 25 MG capsule 1 cap in AM and 2 caps at Bedtime for muscle spasm 90 capsule 6 2/20/2018    folic acid (FOLVITE) 1 MG tablet Take 1 tablet (1 mg total) by mouth once daily. 90 tablet 3 Taking       Review of patient's allergies indicates:   Allergen Reactions    Lisinopril Other (See Comments)     Angioedema      Vicodin [hydrocodone-acetaminophen] Rash        Review of Systems:  Constitutional: No fever or chills, no weight changes.  Eyes: No visual changes or photophobia  HEENT: No nasal congestion or sore throat  Respiratory: No cough+ shorness of breath  Cardiovascular: No chest pain or palpitations  Gastrointestinal: Good appetite, no nausea or vomiting, no change in bowel habits  Genitourinary: No hematuria or dysuria  Skin: No rash or pruritis  Hematologic/lymphatic: No easy bruising, bleeding or lymphadenopathy  Musculoskeletal: No arthralgias or myalgias  Neurological: , weaknes of LE. No seizures or tremors  Endocrine: No heat/cold intolerance.  No polyuria/polydipsia.  Psychiatric:  No depression or anxiety.     OBJECTIVE:     Vital Signs (Most Recent)  Temp: 97.9 °F (36.6 °C) (02/25/18 1209)  Pulse: 76 (02/25/18 1400)  Resp: 18 (02/25/18 1330)  BP: (!) 172/74 (02/25/18 1209)  SpO2: 98 % (02/25/18 1330)    Vital Signs Range (Last 24H):  Temp:  [97.4 °F (36.3 °C)-99 °F (37.2 °C)]   Pulse:  [72-81]   Resp:  [16-20]   BP: (135-179)/(63-77)   SpO2:   [91 %-100 %]     Physical Exam:    General appearance: Well developed, well nourished, On NC O2  Head: Normocephalic, atraumatic  Eyes:  Conjunctivae nl. Sclera anicteric. PERRL.  HEENT: Lips, mucosa, and tongue normal; teeth and gums normal and oropharynx clear.  Neck: Supple, trachea midline, thyroid not enlarged,   Lungs: Clear to auscultation bilaterally and normal respiratory effort  Heart: Regular rate and rhythm, S1, S2 normal, no murmur, click, rub or gallop  Abdomen: Soft, non-tender non-distended; bowel sounds normal; no masses,  no organomegaly  Extremities: No cyanosis or clubbing. No edema  Pulses: 2+ and symmetric  Skin: Skin color, texture, turgor normal. No rashes or lesions  Lymph nodes: Cervical, supraclavicular, and axillary nodes normal.  Neurologic: Normal strength and tone. No focal numbness or weakness  Psychiatric:  Alert and oriented times 3.  Affect appropriate.     Diagnostic Results:  Labs: Reviewed  X-Ray: Reviewed  US: Reviewed    ASSESSMENT/PLAN:     1. DARLING due to cardiorenal syndrome on CKD stage 3, resolved Acute on Chronic Diastolic CHF/ Pulmonary HTN/ Sarcoidosis    -She appears clinically euvolemic.  -Baseline Cr recently seem to have been 1.7. Today Cr is 2.0 , I suspect due to diuresis.  -She has not followed up with Dr. Blank in a very long time and needs to do so.  -She does not appear to be on any significant diuretic therapy as outpt other than HCTZ. Given her GFR is unlikley that this medication has any significant diuretic effect.  -If a thiazide is preferred I would recommend chlorthalidone which will also help keep K iin line.  -She may however require a bit stronger chronic diuretic therapy in which case Lasix would be better, perhaps 20 mg daily.  -She also needs dietary education for low Na diet.  -Needs urine protein quant.  -Add NaHCO3 650 mg po bid for Chronic NAGMA    Anemia of CKD  -Check Fe studies, SPIEP. Recent TSH, B12, Folate appear normal  -She had a  colonoscopy in 8/2016 with a few polyps and a EGD in 12 17 with no source of blood loss.  - Given her severe pulmonary disease, CHF and CKD she would likely benefit from transfusion.  -If not then would assess Fe stores, if low replace and then need to start Procrit therapy once Fe replete.    Hx Vit D Defic  -recheck level and PTH    HTN/ HCVD/ Chronic Sultana CHF/Pulm HTN  -BP not goal on Amlodipine.  -Try change to Nifedipine for better BP control.  -Has Hx of angioedema from Ace, but may wish to discss trial of ARB although there is some risk.  -Can also add coreg is this is beneficial in proteinuria    DM2 with likely nephropathy  -appears to be well controlled with HgbA1C 4.8%  -Previously in 2010-11 she was poorly controlled    Thanks for consult will follow         3

## 2018-02-25 NOTE — SUBJECTIVE & OBJECTIVE
Interval History:  Getting PRBC 2 units.  Feels SOB this AM.  SaO2 = 100% on 3 L NC.       Review of Systems   Constitutional: Negative for appetite change and fever.   Eyes: Negative for redness and visual disturbance.   Respiratory: Positive for shortness of breath. Negative for cough.    Cardiovascular: Negative for chest pain and leg swelling.   Gastrointestinal: Negative for abdominal pain, nausea and vomiting.   Endocrine: Negative for cold intolerance and heat intolerance.   Genitourinary: Negative for dysuria and menstrual problem.   Musculoskeletal: Negative for gait problem and neck pain.   Skin: Negative.    Allergic/Immunologic: Negative for environmental allergies and food allergies.   Neurological: Positive for weakness. Negative for syncope.        Left hemiparesis     Hematological: Negative for adenopathy.   Psychiatric/Behavioral: Negative for agitation and confusion.     Objective:     Vital Signs (Most Recent):  Temp: 97.9 °F (36.6 °C) (02/25/18 1015)  Pulse: 78 (02/25/18 1015)  Resp: 20 (02/25/18 1015)  BP: (!) 165/72 (02/25/18 1015)  SpO2: 100 % (02/25/18 1015) Vital Signs (24h Range):  Temp:  [97.4 °F (36.3 °C)-99 °F (37.2 °C)] 97.9 °F (36.6 °C)  Pulse:  [71-78] 78  Resp:  [16-20] 20  SpO2:  [91 %-100 %] 100 %  BP: (135-175)/(63-77) 165/72     Weight: 77.3 kg (170 lb 6.7 oz)  Body mass index is 30.19 kg/m².  No intake or output data in the 24 hours ending 02/25/18 1101   Physical Exam   Constitutional: She is oriented to person, place, and time. She appears well-developed and well-nourished.   HENT:   Head: Normocephalic and atraumatic.   Eyes: Conjunctivae are normal. Pupils are equal, round, and reactive to light.   Neck: Normal range of motion. Neck supple.   Cardiovascular: Normal rate, regular rhythm and normal heart sounds.    Pulmonary/Chest: Effort normal. She has rales.   Breathing easy; rales to right mid lower posterior;   Abdominal: Soft. Bowel sounds are normal. She exhibits no  mass. There is no guarding.   Musculoskeletal: Normal range of motion. She exhibits edema.   LLE edema Trace. No RLL edema.  Improved vs admit.    Neurological: She is alert and oriented to person, place, and time.   Skin: Skin is warm and dry.   Psychiatric: She has a normal mood and affect. Her behavior is normal.       Significant Labs:   Blood Culture: No results for input(s): LABBLOO in the last 48 hours.  CBC:   Recent Labs  Lab 02/24/18 0458 02/25/18 0515   WBC 4.45 5.50   HGB 6.9* 6.7*   HCT 22.1* 21.8*    170     CMP:   Recent Labs  Lab 02/24/18 0458 02/25/18 0515    143   K 4.2 4.5   * 114*   CO2 20* 18*    133*   BUN 74* 77*   CREATININE 2.1* 2.0*   CALCIUM 8.5* 8.6*   PROT 5.9* 6.1   ALBUMIN 3.0* 3.1*   BILITOT 0.4 0.5   ALKPHOS 65 65   AST 8* 11   ALT 8* 9*   ANIONGAP 8 11   EGFRNONAA 25* 27*       Significant Imaging: I have reviewed all pertinent imaging results/findings within the past 24 hours.   Imaging Results          US Lower Extremity Veins Left (Final result)  Result time 02/21/18 17:35:34    Final result by Anoop Perez MD (02/21/18 17:35:34)                 Impression:      No evidence of acute venous thrombosis of the left lower extremity.    Nonspecific edema in the left popliteal fossa.      Electronically signed by: ANOOP PEREZ MD  Date:     02/21/18  Time:    17:35              Narrative:    [id number]  02/21/18  16:20:00 [id number] ([facility]) : US LOWER EXTREMITY VEINS LEFT    SUPPLIED CLINICAL HISTORY:  Edema, eval for clot     ADDITIONAL CLINICAL HISTORY: N/A    TECHNIQUE: Duplex and color flow Doppler evaluation of the left lower extremities.    COMPARISON: None.    FINDINGS:  Left lower extremity has  no evidence of acute venous thrombosis in the common femoral, superficial femoral, greater saphenous, popliteal, peroneal, anterior tibial, and posterior tibial veins.  There is nonspecific edema in the left popliteal fossa.                             X-Ray Chest  1 View (Final result)  Result time 02/21/18 10:24:29    Final result by Donny Joseph MD (02/21/18 10:24:29)                 Impression:      Interval development of moderate right bilateral pulmonary consolidation predominantly in a central and basilar distribution suggestive of moderate pulmonary edema.  Pneumonia is considered less likely.  Poor visualization of the left hemidiaphragm and left costophrenic angle.  A small layering left-sided pleural effusion is suspected.  Prominence of the cardiac silhouette, stable.      Electronically signed by: DONNY JOSEPH MD  Date:     02/21/18  Time:    10:24              Narrative:    Comparison is made to prior examination dated 2/15/18.    A single AP radiograph of the chest was obtained.  The cardiac silhouette is prominent in size as before.  Atherosclerotic calcification is present within the aortic arch.  There is bilateral pulmonary consolidation identified predominantly within the mid to lower lungs more pronounced on the right than on the left.  Findings are suggestive of moderate pulmonary edema and this represents a detrimental change when compared to the prior examination.  Pneumonia is considered less likely but cannot be excluded.  There is poor visualization of the left hemidiaphragm.  A small layering left-sided pleural effusion is suspected.  There is no evidence for pneumothorax.  Bony structures are grossly intact.

## 2018-02-25 NOTE — PLAN OF CARE
Problem: Patient Care Overview  Goal: Plan of Care Review  Outcome: Ongoing (interventions implemented as appropriate)  Received pt on RA; no distress noted. Tolerated tx well; will wear bipap QHS.

## 2018-02-25 NOTE — CHAPLAIN
cultivated a relationship of care and support with pt and fam.   explored pt concerns and provided anxiety management, reflective listening and prayer.     SC will continue to follow this pt.     Estrellita ROJAS  [id number]

## 2018-02-25 NOTE — ASSESSMENT & PLAN NOTE
- Diuresed with initial improvement in respiratory status  - Will need to be on Lasix outpatient.  - Giving IV Lasix 80 daily so far.  Had improvement but today with SOB.   - Cardiology following.

## 2018-02-25 NOTE — PLAN OF CARE
Problem: Patient Care Overview  Goal: Plan of Care Review  Outcome: Ongoing (interventions implemented as appropriate)  Plan of care reviewed and followed with Pt. No significant events during this shift. Incontinence care and weight shift assistance provided. Pt denies and pain or discomfort. VSS on RA. Pt resting in bed comfortably. Will continue to monitor.

## 2018-02-25 NOTE — ASSESSMENT & PLAN NOTE
- Amlodipine 10.   - Hydralazine 100 TID.  - Labetalol 200 BID.  At home takes 500 TID per MAR.  DC labetalol as has asthma.    - Start Metoprolol 50 BID.   - hydralazine PRN.   - Nephrology to see today.

## 2018-02-25 NOTE — PLAN OF CARE
Problem: Patient Care Overview  Goal: Plan of Care Review  Outcome: Ongoing (interventions implemented as appropriate)  Patient on oxygen at time of visit; aerosol treatments given as scheduled with no complications. V60 BiPAP at bedside. No change in therapy.

## 2018-02-25 NOTE — PROGRESS NOTES
"Pt refused BIPAP after attempt to wear it;  stated "pressure was too much." Placed back on 2L NC   "

## 2018-02-25 NOTE — ASSESSMENT & PLAN NOTE
- Got Vanc, Rocephin, Zithromax in ED.   - Was on Rocephin Zithromax empirically now stopped.   - Empiric Tamiflu stopped.  - Blood cultures 2/21 pending no growth so far.  Flu swab negative.   - CXR appears a little better after Lasix dose.   - Afebrile and WBC WNL.   - Symptoms possibly from edema.  - Was followed by [facility].

## 2018-02-25 NOTE — ASSESSMENT & PLAN NOTE
- Appears largely due to pulmonary edema.  - Improved with diuresis  - Today patient feels SOB again.  She has rales notable on right side.  SaO2 100% on 3 L NC.   - Repeat CXR.  - Getting Lasix 80 dose after 1st PRBC.

## 2018-02-25 NOTE — PLAN OF CARE
Problem: Patient Care Overview  Goal: Plan of Care Review  Outcome: Ongoing (interventions implemented as appropriate)  Patient vitals WNL, free from fall and injury. Turned side to side, due meds given. Safety maintained,  at bedside and is involve in patient care. On tele monitor, NSR rhythm. L sided body weakness noted, PEG tube intact. Seen from time to time, no complaints.

## 2018-02-26 ENCOUNTER — TELEPHONE (OUTPATIENT)
Dept: INTERNAL MEDICINE | Facility: CLINIC | Age: 58
End: 2018-02-26

## 2018-02-26 PROBLEM — E55.9 VITAMIN D DEFICIENCY: Status: ACTIVE | Noted: 2018-02-26

## 2018-02-26 LAB
25(OH)D3+25(OH)D2 SERPL-MCNC: 7 NG/ML
ALBUMIN SERPL BCP-MCNC: 3.5 G/DL
ALP SERPL-CCNC: 75 U/L
ALT SERPL W/O P-5'-P-CCNC: 11 U/L
ANION GAP SERPL CALC-SCNC: 11 MMOL/L
AST SERPL-CCNC: 11 U/L
BACTERIA BLD CULT: NORMAL
BACTERIA BLD CULT: NORMAL
BASOPHILS # BLD AUTO: 0.03 K/UL
BASOPHILS NFR BLD: 0.5 %
BILIRUB SERPL-MCNC: 0.5 MG/DL
BUN SERPL-MCNC: 69 MG/DL
CALCIUM SERPL-MCNC: 9.2 MG/DL
CHLORIDE SERPL-SCNC: 113 MMOL/L
CO2 SERPL-SCNC: 20 MMOL/L
CREAT SERPL-MCNC: 2.1 MG/DL
CREAT UR-MCNC: 36.1 MG/DL
DIFFERENTIAL METHOD: ABNORMAL
EOSINOPHIL # BLD AUTO: 0.4 K/UL
EOSINOPHIL NFR BLD: 6.4 %
ERYTHROCYTE [DISTWIDTH] IN BLOOD BY AUTOMATED COUNT: 17 %
EST. GFR  (AFRICAN AMERICAN): 29 ML/MIN/1.73 M^2
EST. GFR  (NON AFRICAN AMERICAN): 25 ML/MIN/1.73 M^2
FERRITIN SERPL-MCNC: 304 NG/ML
GLUCOSE SERPL-MCNC: 117 MG/DL
HAPTOGLOB SERPL-MCNC: 198 MG/DL
HCT VFR BLD AUTO: 29.2 %
HGB BLD-MCNC: 9.4 G/DL
IRON SERPL-MCNC: 36 UG/DL
LDH SERPL L TO P-CCNC: 176 U/L
LYMPHOCYTES # BLD AUTO: 1.2 K/UL
LYMPHOCYTES NFR BLD: 21.9 %
MAGNESIUM SERPL-MCNC: 2 MG/DL
MCH RBC QN AUTO: 28.5 PG
MCHC RBC AUTO-ENTMCNC: 32.2 G/DL
MCV RBC AUTO: 89 FL
MONOCYTES # BLD AUTO: 0.4 K/UL
MONOCYTES NFR BLD: 7.3 %
NEUTROPHILS # BLD AUTO: 3.6 K/UL
NEUTROPHILS NFR BLD: 63.7 %
PHOSPHATE SERPL-MCNC: 3.8 MG/DL
PLATELET # BLD AUTO: 159 K/UL
PMV BLD AUTO: 9.8 FL
POTASSIUM SERPL-SCNC: 4.3 MMOL/L
PROT SERPL-MCNC: 6.9 G/DL
PROT UR-MCNC: 71 MG/DL
PROT/CREAT RATIO, UR: 1.97
PTH-INTACT SERPL-MCNC: 152.5 PG/ML
RBC # BLD AUTO: 3.3 M/UL
SATURATED IRON: 20 %
SODIUM SERPL-SCNC: 144 MMOL/L
TOTAL IRON BINDING CAPACITY: 178 UG/DL
TRANSFERRIN SERPL-MCNC: 120 MG/DL
URATE SERPL-MCNC: 8.8 MG/DL
WBC # BLD AUTO: 5.61 K/UL

## 2018-02-26 PROCEDURE — 99900035 HC TECH TIME PER 15 MIN (STAT)

## 2018-02-26 PROCEDURE — 80053 COMPREHEN METABOLIC PANEL: CPT

## 2018-02-26 PROCEDURE — 86334 IMMUNOFIX E-PHORESIS SERUM: CPT | Mod: 26,,, | Performed by: PATHOLOGY

## 2018-02-26 PROCEDURE — 83010 ASSAY OF HAPTOGLOBIN QUANT: CPT

## 2018-02-26 PROCEDURE — 86335 IMMUNFIX E-PHORSIS/URINE/CSF: CPT

## 2018-02-26 PROCEDURE — 11000001 HC ACUTE MED/SURG PRIVATE ROOM

## 2018-02-26 PROCEDURE — 84100 ASSAY OF PHOSPHORUS: CPT

## 2018-02-26 PROCEDURE — 25000003 PHARM REV CODE 250: Performed by: HOSPITALIST

## 2018-02-26 PROCEDURE — 86334 IMMUNOFIX E-PHORESIS SERUM: CPT

## 2018-02-26 PROCEDURE — 82306 VITAMIN D 25 HYDROXY: CPT

## 2018-02-26 PROCEDURE — 63600175 PHARM REV CODE 636 W HCPCS: Performed by: HOSPITALIST

## 2018-02-26 PROCEDURE — 83615 LACTATE (LD) (LDH) ENZYME: CPT

## 2018-02-26 PROCEDURE — 25000003 PHARM REV CODE 250: Performed by: NURSE PRACTITIONER

## 2018-02-26 PROCEDURE — 84550 ASSAY OF BLOOD/URIC ACID: CPT

## 2018-02-26 PROCEDURE — 97802 MEDICAL NUTRITION INDIV IN: CPT

## 2018-02-26 PROCEDURE — 36415 COLL VENOUS BLD VENIPUNCTURE: CPT

## 2018-02-26 PROCEDURE — 84156 ASSAY OF PROTEIN URINE: CPT

## 2018-02-26 PROCEDURE — 86335 IMMUNFIX E-PHORSIS/URINE/CSF: CPT | Mod: 26,,, | Performed by: PATHOLOGY

## 2018-02-26 PROCEDURE — 94640 AIRWAY INHALATION TREATMENT: CPT

## 2018-02-26 PROCEDURE — 85025 COMPLETE CBC W/AUTO DIFF WBC: CPT

## 2018-02-26 PROCEDURE — 99232 SBSQ HOSP IP/OBS MODERATE 35: CPT | Mod: ,,, | Performed by: HOSPITALIST

## 2018-02-26 PROCEDURE — 82728 ASSAY OF FERRITIN: CPT

## 2018-02-26 PROCEDURE — 83540 ASSAY OF IRON: CPT

## 2018-02-26 PROCEDURE — 27000221 HC OXYGEN, UP TO 24 HOURS

## 2018-02-26 PROCEDURE — 94660 CPAP INITIATION&MGMT: CPT

## 2018-02-26 PROCEDURE — 25000003 PHARM REV CODE 250: Performed by: INTERNAL MEDICINE

## 2018-02-26 PROCEDURE — 25000242 PHARM REV CODE 250 ALT 637 W/ HCPCS: Performed by: HOSPITALIST

## 2018-02-26 PROCEDURE — 83735 ASSAY OF MAGNESIUM: CPT

## 2018-02-26 PROCEDURE — 83970 ASSAY OF PARATHORMONE: CPT

## 2018-02-26 PROCEDURE — 94761 N-INVAS EAR/PLS OXIMETRY MLT: CPT

## 2018-02-26 RX ORDER — NIFEDIPINE 30 MG/1
60 TABLET, EXTENDED RELEASE ORAL 2 TIMES DAILY
Status: DISCONTINUED | OUTPATIENT
Start: 2018-02-26 | End: 2018-02-27 | Stop reason: HOSPADM

## 2018-02-26 RX ORDER — ERGOCALCIFEROL 1.25 MG/1
50000 CAPSULE ORAL
Status: DISCONTINUED | OUTPATIENT
Start: 2018-02-26 | End: 2018-02-27 | Stop reason: HOSPADM

## 2018-02-26 RX ADMIN — SODIUM BICARBONATE 650 MG TABLET 650 MG: at 09:02

## 2018-02-26 RX ADMIN — LEVETIRACETAM 500 MG: 500 TABLET, FILM COATED ORAL at 09:02

## 2018-02-26 RX ADMIN — HYDRALAZINE HYDROCHLORIDE 100 MG: 25 TABLET, FILM COATED ORAL at 05:02

## 2018-02-26 RX ADMIN — NIFEDIPINE 90 MG: 30 TABLET, FILM COATED, EXTENDED RELEASE ORAL at 09:02

## 2018-02-26 RX ADMIN — HEPARIN SODIUM 5000 UNITS: 5000 INJECTION, SOLUTION INTRAVENOUS; SUBCUTANEOUS at 02:02

## 2018-02-26 RX ADMIN — METOPROLOL TARTRATE 50 MG: 50 TABLET ORAL at 09:02

## 2018-02-26 RX ADMIN — HYDRALAZINE HYDROCHLORIDE 100 MG: 25 TABLET, FILM COATED ORAL at 10:02

## 2018-02-26 RX ADMIN — ATORVASTATIN CALCIUM 40 MG: 20 TABLET, FILM COATED ORAL at 09:02

## 2018-02-26 RX ADMIN — IPRATROPIUM BROMIDE AND ALBUTEROL SULFATE 3 ML: .5; 3 SOLUTION RESPIRATORY (INHALATION) at 07:02

## 2018-02-26 RX ADMIN — ERGOCALCIFEROL 50000 UNITS: 1.25 CAPSULE ORAL at 11:02

## 2018-02-26 RX ADMIN — HEPARIN SODIUM 5000 UNITS: 5000 INJECTION, SOLUTION INTRAVENOUS; SUBCUTANEOUS at 09:02

## 2018-02-26 RX ADMIN — NIFEDIPINE 60 MG: 30 TABLET, FILM COATED, EXTENDED RELEASE ORAL at 09:02

## 2018-02-26 RX ADMIN — IPRATROPIUM BROMIDE AND ALBUTEROL SULFATE 3 ML: .5; 3 SOLUTION RESPIRATORY (INHALATION) at 02:02

## 2018-02-26 RX ADMIN — IPRATROPIUM BROMIDE AND ALBUTEROL SULFATE 3 ML: .5; 3 SOLUTION RESPIRATORY (INHALATION) at 08:02

## 2018-02-26 RX ADMIN — FAMOTIDINE 20 MG: 20 TABLET, FILM COATED ORAL at 09:02

## 2018-02-26 RX ADMIN — HYDRALAZINE HYDROCHLORIDE 100 MG: 25 TABLET, FILM COATED ORAL at 02:02

## 2018-02-26 RX ADMIN — FOLIC ACID 1 MG: 1 TABLET ORAL at 09:02

## 2018-02-26 RX ADMIN — ASPIRIN 81 MG: 81 TABLET, COATED ORAL at 09:02

## 2018-02-26 RX ADMIN — ACETAMINOPHEN 650 MG: 325 TABLET ORAL at 05:02

## 2018-02-26 RX ADMIN — HEPARIN SODIUM 5000 UNITS: 5000 INJECTION, SOLUTION INTRAVENOUS; SUBCUTANEOUS at 05:02

## 2018-02-26 NOTE — PLAN OF CARE
Problem: Patient Care Overview  Goal: Plan of Care Review  Outcome: Ongoing (interventions implemented as appropriate)  Pt remains on 2LNC. Tx given and tolerated well. Pt remains stable.

## 2018-02-26 NOTE — CONSULTS
Food & Nutrition  Education    Diet Education:Low sodium diet  Time Spent: 15 min  Learners:pt and pt's family      Nutrition Education provided with handouts: Low sodium diet therapy     Comments: Educated pt on low sodium diet, per nutrition consult. Pt educated on dietary strategies of lowering sodium intake by reading food labels, decreasing salt additives on meals, sodium content in processed and unprocessed foods, and healthier low sodium options. Pt and pt's family voiced understanding during education and is willing to incorporate nutrition education into lifestyle.       All questions and concerns answered. Dietitian's contact information provided.       Please Re-consult as needed    Popeye Cole  Dietetic Intern    Thanks!

## 2018-02-26 NOTE — PROGRESS NOTES
Pt voided 175cc to bedpan, but also spilled urine to the incontinence pad. Bladder scanned multiple times, and post void residual found to be 0cc.

## 2018-02-26 NOTE — PLAN OF CARE
Problem: Patient Care Overview  Goal: Plan of Care Review  Outcome: Ongoing (interventions implemented as appropriate)  Pt on 2L NC. Sats 98%. No distress noted. Aerosol tx tolerated well. BIPAP at bedside; pt refusing to wear at this time. Will continue to monitor.    0040: Pt placed on BIPAP for the night.

## 2018-02-26 NOTE — PLAN OF CARE
02/26/18 1541   Medicare Message   Important Message from Medicare regarding Discharge Appeal Rights Given to patient/caregiver;Explained to patient/caregiver;Signed/date by patient/caregiver   Date IMM was signed 02/26/18   Time IMM was signed [id number]

## 2018-02-26 NOTE — PROGRESS NOTES
[facility] Hospital Medicine  Progress Note    Patient Name: Lucy Perez  MRN: [id number]  Patient Class: IP- Inpatient   Admission Date: 2018  Length of Stay: 5 days  Attending Physician: Tommy Evans MD  Primary Care Provider: Beverly Muniz MD        Subjective:     Principal Problem:Acute respiratory failure with hypoxia    HPI:  58 yo female with PMH CVA secondary to bleed with residual left hemiparesis, sarcoidosis ( and patient seem unsure of this diagnosis), asthma, HTN, DM-2, was in her usual state of health at home until about 7 days ago when she began to experience progressive symptoms of SOB, wheezing, no cough, no chest pain, no fever.  No NVD, but reports constipation last BM 3 days ago.  Feels a little better on BiPap.       AB.29 / 35 / 79 on BiPap 10/5 30%.   CXR shows new opacity on right more so versus previous, and reports suggest edema.  BNP is 546.        Hospital Course:  2-D ECHO 18:  CONCLUSIONS     1 - Severe left atrial enlargement.     2 - Concentric hypertrophy.     3 - No wall motion abnormalities.     4 - Normal left ventricular systolic function (EF 60-65%).     5 - Impaired LV relaxation, elevated LAP (grade 2 diastolic dysfunction).     6 - Pulmonary hypertension. The estimated PA systolic pressure is greater than 41 mmHg.     7 - Mild to moderate mitral regurgitation.     8 - Small pericardial effusion.       Interval History:   Feels better.  Breathing better.      Review of Systems   Constitutional: Negative for appetite change and fever.   Eyes: Negative for redness and visual disturbance.   Respiratory: Negative for cough and shortness of breath.    Cardiovascular: Negative for chest pain and leg swelling.   Gastrointestinal: Negative for abdominal pain, nausea and vomiting.   Endocrine: Negative for cold intolerance and heat intolerance.   Genitourinary: Negative for dysuria and menstrual problem.   Musculoskeletal:  Negative for gait problem and neck pain.   Skin: Negative.    Allergic/Immunologic: Negative for environmental allergies and food allergies.   Neurological: Positive for weakness. Negative for syncope.        Left hemiparesis     Hematological: Negative for adenopathy.   Psychiatric/Behavioral: Negative for agitation and confusion.     Objective:     Vital Signs (Most Recent):  Temp: 98.5 °F (36.9 °C) (02/26/18 1300)  Pulse: 69 (02/26/18 1300)  Resp: 18 (02/26/18 1300)  BP: (!) 170/72 (02/26/18 1300)  SpO2: (!) 94 % (02/26/18 1300) Vital Signs (24h Range):  Temp:  [97.9 °F (36.6 °C)-98.5 °F (36.9 °C)] 98.5 °F (36.9 °C)  Pulse:  [66-82] 69  Resp:  [18-20] 18  SpO2:  [94 %-100 %] 94 %  BP: (142-184)/(72-85) 170/72     Weight: 77.3 kg (170 lb 6.7 oz)  Body mass index is 30.19 kg/m².    Intake/Output Summary (Last 24 hours) at 02/26/18 1332  Last data filed at 02/26/18 1200   Gross per 24 hour   Intake           518.75 ml   Output              175 ml   Net           343.75 ml      Physical Exam   Constitutional: She is oriented to person, place, and time. She appears well-developed and well-nourished.   HENT:   Head: Normocephalic and atraumatic.   Eyes: Conjunctivae are normal. Pupils are equal, round, and reactive to light.   Neck: Normal range of motion. Neck supple.   Cardiovascular: Normal rate, regular rhythm and normal heart sounds.    Pulmonary/Chest: Effort normal. She has rales.   Breathing easy; rales to right mid lower posterior a little improved;   Abdominal: Soft. Bowel sounds are normal. She exhibits no mass. There is no guarding.   Musculoskeletal: Normal range of motion. She exhibits edema.   LLE edema Trace. No RLL edema.  Improved vs admit.    Neurological: She is alert and oriented to person, place, and time.   Skin: Skin is warm and dry.   Psychiatric: She has a normal mood and affect. Her behavior is normal.       Significant Labs:   BMP:   Recent Labs  Lab 02/26/18  0537   *      K  4.3   *   CO2 20*   BUN 69*   CREATININE 2.1*   CALCIUM 9.2   MG 2.0     CBC:   Recent Labs  Lab 02/25/18  0515 02/26/18  0537   WBC 5.50 5.61   HGB 6.7* 9.4*   HCT 21.8* 29.2*    159       Significant Imaging: I have reviewed all pertinent imaging results/findings within the past 24 hours.   Imaging Results          US Lower Extremity Veins Left (Final result)  Result time 02/21/18 17:35:34    Final result by Anoop Perez MD (02/21/18 17:35:34)                 Impression:      No evidence of acute venous thrombosis of the left lower extremity.    Nonspecific edema in the left popliteal fossa.      Electronically signed by: ANOOP PEREZ MD  Date:     02/21/18  Time:    17:35              Narrative:    [id number]  02/21/18  16:20:00 [id number] ([facility]) : US LOWER EXTREMITY VEINS LEFT    SUPPLIED CLINICAL HISTORY:  Edema, eval for clot     ADDITIONAL CLINICAL HISTORY: N/A    TECHNIQUE: Duplex and color flow Doppler evaluation of the left lower extremities.    COMPARISON: None.    FINDINGS:  Left lower extremity has  no evidence of acute venous thrombosis in the common femoral, superficial femoral, greater saphenous, popliteal, peroneal, anterior tibial, and posterior tibial veins.  There is nonspecific edema in the left popliteal fossa.                             X-Ray Chest 1 View (Final result)  Result time 02/21/18 10:24:29    Final result by Donny Joseph MD (02/21/18 10:24:29)                 Impression:      Interval development of moderate right bilateral pulmonary consolidation predominantly in a central and basilar distribution suggestive of moderate pulmonary edema.  Pneumonia is considered less likely.  Poor visualization of the left hemidiaphragm and left costophrenic angle.  A small layering left-sided pleural effusion is suspected.  Prominence of the cardiac silhouette, stable.      Electronically signed by: DONNY JOSEPH MD  Date:     02/21/18  Time:    10:24              Narrative:     Comparison is made to prior examination dated 2/15/18.    A single AP radiograph of the chest was obtained.  The cardiac silhouette is prominent in size as before.  Atherosclerotic calcification is present within the aortic arch.  There is bilateral pulmonary consolidation identified predominantly within the mid to lower lungs more pronounced on the right than on the left.  Findings are suggestive of moderate pulmonary edema and this represents a detrimental change when compared to the prior examination.  Pneumonia is considered less likely but cannot be excluded.  There is poor visualization of the left hemidiaphragm.  A small layering left-sided pleural effusion is suspected.  There is no evidence for pneumothorax.  Bony structures are grossly intact.                            Assessment/Plan:      * Acute respiratory failure with hypoxia    -  states patient uses only 2 L NC oxygen at night PRN, but not always.   - History of sarcoid, but  and patient didn't seem definitely aware of this.  - She does have some wheezing, but fair air movement.   - Some JVD.    - ABG consistent with hypoxia on BiPap  - CHF consideration given CXR but also would consider infectious process.  - CXR appears improved today, LLE less edema today, would suggest diuresis helped.   - BNP elevated, but patient with HTN and advanced CKD.   - States she used to be on Lasix, but it was stopped.   - 2-D ECHO EF 60-65%, diastolic dysfunction.  - Flu swab negative.  Blood cultures pending no growth so far; at this point doesn't appear definitely infected, WBC WNL.    - Continue antibiotics.    - PCC following.  Discussed with Dr. Belle.             Hypoxia    - States uses oxygen at home as needed.            Pneumonia due to infectious organism    - Got Vanc, Rocephin, Zithromax in ED.   - Was on Rocephin Zithromax empirically now stopped.   - Empiric Tamiflu stopped.  - Blood cultures 2/21 pending no growth so far.  Flu swab  negative.   - CXR appears a little better after Lasix dose.   - Afebrile and WBC WNL.   - Symptoms possibly from edema.  - Was followed by PCC.           Edema of left lower extremity    -  thinks appears more swollen.   - LLE US no evidence DVT.  - Back to baseline with diuresis.                 Wheezing    - Nebs q 6  - One dose Solumedrol in ED  - Resolved, good air movement no wheeze              Shortness of breath    - Appears largely due to pulmonary edema.  - Improved with diuresis  - Today patient feels SOB again.  She has rales notable on right side.  SaO2 100% on 3 L NC.   - Repeat CXR.  - Getting Lasix 80 dose after 1st PRBC.            Controlled type 2 diabetes mellitus with stage 3 chronic kidney disease, without long-term current use of insulin    Results for ARASH BARRIENTOS (MRN [id number]) as of 2/22/2018 07:16   Ref. Range 7/31/2017 16:30 11/24/2017 15:06 1/3/2018 13:32   Hemoglobin A1C Latest Ref Range: 4.0 - 5.6 % 5.4 4.8 4.7     - States is not in any DM medications.             Anemia in stage 3 chronic kidney disease    - Hgb 6.7 to 9.4.              History of stroke    - Residual left hemiparesis, husbands states patient uses wheelchair.   - Discussed with  importance of turning patient, and he expressed understanding.   - PT OT            PEG (percutaneous endoscopic gastrostomy) status    -  states is used for meds and patient currently eats solid food as recovered from CVA.              Constipation    - Bisacodyl supp  - Had BM.             Acute on chronic diastolic heart failure    - Diuresed with initial improvement in respiratory status  - Will need to be on Lasix outpatient.  - Giving IV Lasix 80 daily so far.  Had improvement but today with SOB.   - Cardiology following.             Stage 3 chronic kidney disease    - Creatinine stable at 2.1.  - Consulted nephrology given anemia.   - Renal US pending.               Hyperlipidemia    - Atorvastatin  40          Renovascular hypertension    - Amlodipine 10.   - Hydralazine 100 TID.  - Labetalol 200 BID.  At home takes 500 TID per MAR.  DC labetalol as has asthma.    - Start Metoprolol 50 BID.   - hydralazine PRN.   - Nephrology to see today.             VTE Risk Mitigation         Ordered     heparin (porcine) injection 5,000 Units  Every 8 hours     Route:  Subcutaneous        02/21/18 5560          Tommy Evans MD  Department of Hospital Medicine   [facility]

## 2018-02-26 NOTE — SUBJECTIVE & OBJECTIVE
Interval History:   Feels better.  Breathing better.      Review of Systems   Constitutional: Negative for appetite change and fever.   Eyes: Negative for redness and visual disturbance.   Respiratory: Negative for cough and shortness of breath.    Cardiovascular: Negative for chest pain and leg swelling.   Gastrointestinal: Negative for abdominal pain, nausea and vomiting.   Endocrine: Negative for cold intolerance and heat intolerance.   Genitourinary: Negative for dysuria and menstrual problem.   Musculoskeletal: Negative for gait problem and neck pain.   Skin: Negative.    Allergic/Immunologic: Negative for environmental allergies and food allergies.   Neurological: Positive for weakness. Negative for syncope.        Left hemiparesis     Hematological: Negative for adenopathy.   Psychiatric/Behavioral: Negative for agitation and confusion.     Objective:     Vital Signs (Most Recent):  Temp: 98.5 °F (36.9 °C) (02/26/18 1300)  Pulse: 69 (02/26/18 1300)  Resp: 18 (02/26/18 1300)  BP: (!) 170/72 (02/26/18 1300)  SpO2: (!) 94 % (02/26/18 1300) Vital Signs (24h Range):  Temp:  [97.9 °F (36.6 °C)-98.5 °F (36.9 °C)] 98.5 °F (36.9 °C)  Pulse:  [66-82] 69  Resp:  [18-20] 18  SpO2:  [94 %-100 %] 94 %  BP: (142-184)/(72-85) 170/72     Weight: 77.3 kg (170 lb 6.7 oz)  Body mass index is 30.19 kg/m².    Intake/Output Summary (Last 24 hours) at 02/26/18 1332  Last data filed at 02/26/18 1200   Gross per 24 hour   Intake           518.75 ml   Output              175 ml   Net           343.75 ml      Physical Exam   Constitutional: She is oriented to person, place, and time. She appears well-developed and well-nourished.   HENT:   Head: Normocephalic and atraumatic.   Eyes: Conjunctivae are normal. Pupils are equal, round, and reactive to light.   Neck: Normal range of motion. Neck supple.   Cardiovascular: Normal rate, regular rhythm and normal heart sounds.    Pulmonary/Chest: Effort normal. She has rales.   Breathing easy;  rales to right mid lower posterior a little improved;   Abdominal: Soft. Bowel sounds are normal. She exhibits no mass. There is no guarding.   Musculoskeletal: Normal range of motion. She exhibits edema.   LLE edema Trace. No RLL edema.  Improved vs admit.    Neurological: She is alert and oriented to person, place, and time.   Skin: Skin is warm and dry.   Psychiatric: She has a normal mood and affect. Her behavior is normal.       Significant Labs:   BMP:   Recent Labs  Lab 02/26/18  0537   *      K 4.3   *   CO2 20*   BUN 69*   CREATININE 2.1*   CALCIUM 9.2   MG 2.0     CBC:   Recent Labs  Lab 02/25/18  0515 02/26/18  0537   WBC 5.50 5.61   HGB 6.7* 9.4*   HCT 21.8* 29.2*    159       Significant Imaging: I have reviewed all pertinent imaging results/findings within the past 24 hours.   Imaging Results          US Lower Extremity Veins Left (Final result)  Result time 02/21/18 17:35:34    Final result by Anoop Perez MD (02/21/18 17:35:34)                 Impression:      No evidence of acute venous thrombosis of the left lower extremity.    Nonspecific edema in the left popliteal fossa.      Electronically signed by: ANOOP PEREZ MD  Date:     02/21/18  Time:    17:35              Narrative:    [id number]  02/21/18  16:20:00 [id number] ([facility]) : US LOWER EXTREMITY VEINS LEFT    SUPPLIED CLINICAL HISTORY:  Edema, eval for clot     ADDITIONAL CLINICAL HISTORY: N/A    TECHNIQUE: Duplex and color flow Doppler evaluation of the left lower extremities.    COMPARISON: None.    FINDINGS:  Left lower extremity has  no evidence of acute venous thrombosis in the common femoral, superficial femoral, greater saphenous, popliteal, peroneal, anterior tibial, and posterior tibial veins.  There is nonspecific edema in the left popliteal fossa.                             X-Ray Chest 1 View (Final result)  Result time 02/21/18 10:24:29    Final result by Donny Joseph MD (02/21/18 10:24:29)                  Impression:      Interval development of moderate right bilateral pulmonary consolidation predominantly in a central and basilar distribution suggestive of moderate pulmonary edema.  Pneumonia is considered less likely.  Poor visualization of the left hemidiaphragm and left costophrenic angle.  A small layering left-sided pleural effusion is suspected.  Prominence of the cardiac silhouette, stable.      Electronically signed by: SPEEDY FREEMAN MD  Date:     02/21/18  Time:    10:24              Narrative:    Comparison is made to prior examination dated 2/15/18.    A single AP radiograph of the chest was obtained.  The cardiac silhouette is prominent in size as before.  Atherosclerotic calcification is present within the aortic arch.  There is bilateral pulmonary consolidation identified predominantly within the mid to lower lungs more pronounced on the right than on the left.  Findings are suggestive of moderate pulmonary edema and this represents a detrimental change when compared to the prior examination.  Pneumonia is considered less likely but cannot be excluded.  There is poor visualization of the left hemidiaphragm.  A small layering left-sided pleural effusion is suspected.  There is no evidence for pneumothorax.  Bony structures are grossly intact.

## 2018-02-26 NOTE — PLAN OF CARE
CM met with pt for discharge reassessment. Pt having ultrasound today. CM to follow for needs. [facility]     02/26/18 1511   Discharge Assessment   Assessment Type Discharge Planning Reassessment   Assessment information obtained from? Patient;Medical Record   Prior to hospitilization cognitive status: Alert/Oriented   Prior to hospitalization functional status: Completely Dependent;Assistive Equipment   Current cognitive status: Alert/Oriented   Current Functional Status: Completely Dependent;Assistive Equipment   Lives With spouse   Patient currently being followed by outpatient case management? No   Patient currently receives any other outside agency services? No   Transportation Available family or friend will provide

## 2018-02-26 NOTE — TELEPHONE ENCOUNTER
Called and left message for pt, in regard to what company did her hospital bed come from.    Will try back later.    ALMA Maynard

## 2018-02-26 NOTE — PLAN OF CARE
Problem: Patient Care Overview  Goal: Plan of Care Review  Outcome: Ongoing (interventions implemented as appropriate)  Patient free from fall and injuries, AVSS with no complaints of SOB. Due meds given, needs attended. On tele monitor, NSR rhythm. Sleeping comfortably in bed, seen at  intervals.

## 2018-02-26 NOTE — PROGRESS NOTES
"Nephrology  Progress Note    Admit Date: 2/21/2018   LOS: 5 days     SUBJECTIVE:     Follow-up For:  Acute respiratory failure with hypoxia    Interval History:     Feels better today.  Daughter at bedside and discussed with team.  No CP/SOB.  Renal fxn noted.  I/O's not done.     Review of Systems:  Constitutional: No fever or chills  Respiratory: No cough or shortness of breath  Cardiovascular: No chest pain or palpitations  Gastrointestinal: No nausea or vomiting  Neurological: No confusion or weakness    OBJECTIVE:     Vital Signs Range (Last 24H):  BP (!) 172/79 (BP Location: Right arm, Patient Position: Lying)   Pulse 75   Temp 98.2 °F (36.8 °C) (Oral)   Resp 18   Ht 5' 3" (1.6 m)   Wt 77.3 kg (170 lb 6.7 oz)   LMP  (LMP Unknown)   SpO2 99%   Breastfeeding? No   BMI 30.19 kg/m²     Temp:  [97.9 °F (36.6 °C)-98.2 °F (36.8 °C)]   Pulse:  [66-82]   Resp:  [18-20]   BP: (142-184)/(73-85)   SpO2:  [94 %-100 %]     I & O (Last 24H):  Intake/Output Summary (Last 24 hours) at 02/26/18 1016  Last data filed at 02/25/18 1504   Gross per 24 hour   Intake           485.42 ml   Output                0 ml   Net           485.42 ml       Physical Exam:  General appearance: Well developed, well nourished  Eyes:  Conjunctivae/corneas clear. PERRL.  Lungs: Normal respiratory effort,   clear to auscultation bilaterally   Heart: Regular rate and rhythm, S1, S2 normal, no murmur, rub or ragini.  Abdomen: Soft, non-tender non-distended; bowel sounds normal; no masses,  no organomegaly, PEG.    Extremities: No cyanosis or clubbing. No edema.    Skin: Skin color, texture, turgor normal. No rashes or lesions  Neurologic: left weakness secondary to CVA    Laboratory Data:    Recent Labs  Lab 02/26/18  0537   WBC 5.61   RBC 3.30*   HGB 9.4*   HCT 29.2*      MCV 89   MCH 28.5   MCHC 32.2       BMP:   Recent Labs  Lab 02/26/18  0537   *      K 4.3   *   CO2 20*   BUN 69*   CREATININE 2.1*   CALCIUM 9.2 "   MG 2.0     Lab Results   Component Value Date    CALCIUM 9.2 02/26/2018    PHOS 3.8 02/26/2018               Medications:  Medication list was reviewed and changes noted under Assessment/Plan.    Diagnostic Results:        ASSESSMENT/PLAN:     1. DARLING due to cardiorenal syndrome on CKD stage 3, resolved Acute on Chronic Diastolic CHF/ Pulmonary HTN/ Sarcoidosis     -She appears clinically euvolemic.  -Baseline Cr recently seem to have been 1.7. But remains ~2.0 due to continued diuresis.   -She has not followed up with Dr. Blank in a very long time and needs to do so.  -She does not appear to be on any significant diuretic therapy as outpt other than HCTZ. Given her GFR is unlikley that this medication has any significant diuretic effect.  -If a thiazide is preferred I would recommend chlorthalidone which will also help keep K iin line.  -She may however require a bit stronger chronic diuretic therapy in which case Lasix would be better.  -She also needs dietary education for low Na diet.  -check Renal U/S and bladder residual today.   -Need I/O's.   -Added NaHCO3 650 mg po bid for Chronic NAGMA  -Renally dose meds, avoid nephrotoxins, and monitor I/O's closely.       Anemia of CKD  -Fe studies ok.  SPIEP pending. Recent TSH, B12, Folate appear normal  -She had a colonoscopy in 8/2016 with a few polyps and a EGD in 12 17 with no source of blood loss.  -PRBC's given yesterday with appropriate rise.   -check Hapto/LDH today since schistocytes noted on Diff. May need ADAMTS 13.      Severe Vit D Def with Mild HPTH (E55.9, N25.81):  -ergo for now.      HTN/ HCVD/ Chronic [place] CHF/Pulm HTN  -BP not goal   -changed to Nifedipine and will adjust to 60mg BID.  -may need additional agent.    -Has Hx of angioedema from Ace, but may wish to discss trial of ARB although there is some risk.  -Can also add coreg is this is beneficial in proteinuria     DM2 with likely nephropathy  -appears to be well controlled with HgbA1C  4.8%  -Previously in 2010-11 she was poorly controlled      See above.

## 2018-02-27 VITALS
OXYGEN SATURATION: 98 % | WEIGHT: 170.44 LBS | BODY MASS INDEX: 30.2 KG/M2 | HEART RATE: 64 BPM | DIASTOLIC BLOOD PRESSURE: 65 MMHG | TEMPERATURE: 99 F | SYSTOLIC BLOOD PRESSURE: 139 MMHG | RESPIRATION RATE: 16 BRPM | HEIGHT: 63 IN

## 2018-02-27 LAB
ALBUMIN SERPL BCP-MCNC: 3.2 G/DL
ALP SERPL-CCNC: 67 U/L
ALT SERPL W/O P-5'-P-CCNC: 12 U/L
ANION GAP SERPL CALC-SCNC: 10 MMOL/L
AST SERPL-CCNC: 13 U/L
BASOPHILS # BLD AUTO: 0.02 K/UL
BASOPHILS NFR BLD: 0.4 %
BILIRUB SERPL-MCNC: 0.4 MG/DL
BUN SERPL-MCNC: 71 MG/DL
CALCIUM SERPL-MCNC: 8.9 MG/DL
CHLORIDE SERPL-SCNC: 112 MMOL/L
CO2 SERPL-SCNC: 21 MMOL/L
CREAT SERPL-MCNC: 2.2 MG/DL
DIFFERENTIAL METHOD: ABNORMAL
EOSINOPHIL # BLD AUTO: 0.3 K/UL
EOSINOPHIL NFR BLD: 6.2 %
ERYTHROCYTE [DISTWIDTH] IN BLOOD BY AUTOMATED COUNT: 16.8 %
EST. GFR  (AFRICAN AMERICAN): 27 ML/MIN/1.73 M^2
EST. GFR  (NON AFRICAN AMERICAN): 24 ML/MIN/1.73 M^2
GLUCOSE SERPL-MCNC: 132 MG/DL
HCT VFR BLD AUTO: 28.1 %
HGB BLD-MCNC: 8.9 G/DL
INTERPRETATION SERPL IFE-IMP: NORMAL
INTERPRETATION UR IFE-IMP: NORMAL
LYMPHOCYTES # BLD AUTO: 1.2 K/UL
LYMPHOCYTES NFR BLD: 21.6 %
MAGNESIUM SERPL-MCNC: 1.8 MG/DL
MCH RBC QN AUTO: 28.3 PG
MCHC RBC AUTO-ENTMCNC: 31.7 G/DL
MCV RBC AUTO: 90 FL
MONOCYTES # BLD AUTO: 0.3 K/UL
MONOCYTES NFR BLD: 6.2 %
NEUTROPHILS # BLD AUTO: 3.5 K/UL
NEUTROPHILS NFR BLD: 65.2 %
PATHOLOGIST INTERPRETATION IFE: NORMAL
PATHOLOGIST INTERPRETATION UIFE: NORMAL
PHOSPHATE SERPL-MCNC: 3.9 MG/DL
PLATELET # BLD AUTO: 174 K/UL
PMV BLD AUTO: 10 FL
POTASSIUM SERPL-SCNC: 5.3 MMOL/L
PROT SERPL-MCNC: 6.2 G/DL
RBC # BLD AUTO: 3.14 M/UL
SODIUM SERPL-SCNC: 143 MMOL/L
WBC # BLD AUTO: 5.33 K/UL

## 2018-02-27 PROCEDURE — 94660 CPAP INITIATION&MGMT: CPT

## 2018-02-27 PROCEDURE — 80053 COMPREHEN METABOLIC PANEL: CPT

## 2018-02-27 PROCEDURE — 63600175 PHARM REV CODE 636 W HCPCS: Performed by: HOSPITALIST

## 2018-02-27 PROCEDURE — 94761 N-INVAS EAR/PLS OXIMETRY MLT: CPT

## 2018-02-27 PROCEDURE — 25000242 PHARM REV CODE 250 ALT 637 W/ HCPCS: Performed by: HOSPITALIST

## 2018-02-27 PROCEDURE — 84100 ASSAY OF PHOSPHORUS: CPT

## 2018-02-27 PROCEDURE — 25000003 PHARM REV CODE 250: Performed by: HOSPITALIST

## 2018-02-27 PROCEDURE — 99239 HOSP IP/OBS DSCHRG MGMT >30: CPT | Mod: ,,, | Performed by: HOSPITALIST

## 2018-02-27 PROCEDURE — 99900035 HC TECH TIME PER 15 MIN (STAT)

## 2018-02-27 PROCEDURE — 85025 COMPLETE CBC W/AUTO DIFF WBC: CPT

## 2018-02-27 PROCEDURE — 63600175 PHARM REV CODE 636 W HCPCS: Performed by: NURSE PRACTITIONER

## 2018-02-27 PROCEDURE — 25000003 PHARM REV CODE 250: Performed by: NURSE PRACTITIONER

## 2018-02-27 PROCEDURE — 94640 AIRWAY INHALATION TREATMENT: CPT

## 2018-02-27 PROCEDURE — 83735 ASSAY OF MAGNESIUM: CPT

## 2018-02-27 PROCEDURE — 36415 COLL VENOUS BLD VENIPUNCTURE: CPT

## 2018-02-27 RX ORDER — CARVEDILOL 12.5 MG/1
12.5 TABLET ORAL 2 TIMES DAILY WITH MEALS
Qty: 60 TABLET | Refills: 3 | Status: SHIPPED | OUTPATIENT
Start: 2018-02-27 | End: 2018-04-25 | Stop reason: SDUPTHER

## 2018-02-27 RX ORDER — ACETAMINOPHEN 325 MG/1
650 TABLET ORAL EVERY 6 HOURS PRN
Status: DISCONTINUED | OUTPATIENT
Start: 2018-02-27 | End: 2018-02-27 | Stop reason: HOSPADM

## 2018-02-27 RX ORDER — ERGOCALCIFEROL 1.25 MG/1
50000 CAPSULE ORAL
Qty: 4 CAPSULE | Refills: 3 | Status: SHIPPED | OUTPATIENT
Start: 2018-03-05 | End: 2018-07-25 | Stop reason: SDUPTHER

## 2018-02-27 RX ORDER — SODIUM BICARBONATE 650 MG/1
1300 TABLET ORAL 2 TIMES DAILY
Qty: 120 TABLET | Refills: 11 | COMMUNITY
Start: 2018-02-27 | End: 2018-07-25 | Stop reason: SDUPTHER

## 2018-02-27 RX ORDER — FUROSEMIDE 40 MG/1
40 TABLET ORAL DAILY
Qty: 30 TABLET | Refills: 3 | Status: SHIPPED | OUTPATIENT
Start: 2018-02-27 | End: 2018-07-23

## 2018-02-27 RX ORDER — SODIUM BICARBONATE 650 MG/1
2 TABLET ORAL 2 TIMES DAILY
Status: DISCONTINUED | OUTPATIENT
Start: 2018-02-27 | End: 2018-02-27 | Stop reason: HOSPADM

## 2018-02-27 RX ORDER — NIFEDIPINE 90 MG/1
90 TABLET, FILM COATED, EXTENDED RELEASE ORAL DAILY
Qty: 30 TABLET | Refills: 3 | Status: SHIPPED | OUTPATIENT
Start: 2018-02-27 | End: 2018-07-23

## 2018-02-27 RX ORDER — FUROSEMIDE 40 MG/1
40 TABLET ORAL DAILY
Status: DISCONTINUED | OUTPATIENT
Start: 2018-02-27 | End: 2018-02-27 | Stop reason: HOSPADM

## 2018-02-27 RX ADMIN — IPRATROPIUM BROMIDE AND ALBUTEROL SULFATE 3 ML: .5; 3 SOLUTION RESPIRATORY (INHALATION) at 07:02

## 2018-02-27 RX ADMIN — ATORVASTATIN CALCIUM 40 MG: 20 TABLET, FILM COATED ORAL at 09:02

## 2018-02-27 RX ADMIN — FUROSEMIDE 40 MG: 40 TABLET ORAL at 02:02

## 2018-02-27 RX ADMIN — NIFEDIPINE 60 MG: 30 TABLET, FILM COATED, EXTENDED RELEASE ORAL at 09:02

## 2018-02-27 RX ADMIN — ASPIRIN 81 MG: 81 TABLET, COATED ORAL at 09:02

## 2018-02-27 RX ADMIN — LEVETIRACETAM 500 MG: 500 TABLET, FILM COATED ORAL at 09:02

## 2018-02-27 RX ADMIN — ERYTHROPOIETIN 20000 UNITS: 20000 INJECTION, SOLUTION INTRAVENOUS; SUBCUTANEOUS at 02:02

## 2018-02-27 RX ADMIN — HEPARIN SODIUM 5000 UNITS: 5000 INJECTION, SOLUTION INTRAVENOUS; SUBCUTANEOUS at 05:02

## 2018-02-27 RX ADMIN — HYDRALAZINE HYDROCHLORIDE 100 MG: 25 TABLET, FILM COATED ORAL at 02:02

## 2018-02-27 RX ADMIN — HYDRALAZINE HYDROCHLORIDE 100 MG: 25 TABLET, FILM COATED ORAL at 05:02

## 2018-02-27 RX ADMIN — FOLIC ACID 1 MG: 1 TABLET ORAL at 09:02

## 2018-02-27 RX ADMIN — FAMOTIDINE 20 MG: 20 TABLET, FILM COATED ORAL at 09:02

## 2018-02-27 RX ADMIN — SODIUM BICARBONATE TAB 650 MG 1300 MG: 650 TAB at 09:02

## 2018-02-27 RX ADMIN — METOPROLOL TARTRATE 50 MG: 50 TABLET ORAL at 09:02

## 2018-02-27 RX ADMIN — HEPARIN SODIUM 5000 UNITS: 5000 INJECTION, SOLUTION INTRAVENOUS; SUBCUTANEOUS at 02:02

## 2018-02-27 NOTE — PLAN OF CARE
02/27/18 1556   Final Note   Assessment Type Final Discharge Note   Discharge Disposition Home-Health   Hospital Follow Up  Appt(s) scheduled? Yes   Discharge plans and expectations educations in teach back method with documentation complete? Yes   Right Care Referral Info   Post Acute Recommendation Home-care   Referral Type home health   Facility Name [facility]

## 2018-02-27 NOTE — PROGRESS NOTES
[facility]  Cardiology  Progress Note    Patient Name: Lucy Perez  MRN: [id number]  Admission Date: 2/21/2018  Hospital Length of Stay: 5 days  Code Status: Prior   Attending Physician: Tommy Evans MD   Primary Care Physician: Beverly Muniz MD  Expected Discharge Date:   Principal Problem:Acute respiratory failure with hypoxia    Subjective:     Brief HPI:    58 yo female with hypertension, hypercholesterolemia and diabetes mellitus type 2. She has chronic kidney disease stage 4. 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 She says she was diagnosed with sarcoidosis in the 1990s. She presents with increasing dyspnea beginning about 2/13/2018. The shortness of breath got progressively worse and she presents to the ER on 2/21/2018 he denies any chest pain. Legs were mildly edematous on presentation.     Hospital Course:     Diuresis.    2/25/2018: Receiving two units of PRBCs.    Interval History:    Breathing easier after PRBCs. No chest pain.    Review of Systems   Cardiovascular: Negative for chest pain and leg swelling.   Respiratory: Positive for cough and shortness of breath. Negative for hemoptysis, sputum production and wheezing.      Objective:     Vital Signs (Most Recent):  Temp: 98.7 °F (37.1 °C) (02/26/18 2010)  Pulse: 70 (02/26/18 2010)  Resp: 18 (02/26/18 2010)  BP: 135/61 (02/26/18 2010)  SpO2: 95 % (02/26/18 2010) Vital Signs (24h Range):  Temp:  [97.9 °F (36.6 °C)-98.7 °F (37.1 °C)] 98.7 °F (37.1 °C)  Pulse:  [66-80] 70  Resp:  [18-20] 18  SpO2:  [94 %-100 %] 95 %  BP: (135-184)/(61-79) 135/61     Weight: 77.3 kg (170 lb 6.7 oz)  Body mass index is 30.19 kg/m².    SpO2: 95 %  O2 Device (Oxygen Therapy): nasal cannula      Intake/Output Summary (Last 24 hours) at 02/26/18 2041  Last data filed at 02/26/18 1455   Gross per 24 hour   Intake              420 ml   Output              175 ml   Net              245 ml        Lines/Drains/Airways     Drain                 Gastrostomy/Enterostomy Percutaneous endoscopic gastrostomy (PEG) LUQ -- days          Peripheral Intravenous Line                 Peripheral IV - Single Lumen 02/21/18 1210 Right Forearm 5 days                Physical Exam   Cardiovascular: Normal rate, regular rhythm, S1 normal and S2 normal.  Exam reveals gallop and S4. Exam reveals no S3 and no friction rub.    Murmur heard.  High-pitched blowing holosystolic murmur is present with a grade of 2/6  at the apex  Pulmonary/Chest: She has rhonchi.   Abdominal: Soft. Normal appearance. There is no tenderness.   Musculoskeletal:        Right ankle: She exhibits no swelling.        Left ankle: She exhibits no swelling.   Skin: Skin is warm and dry. No rash noted. Nails show no clubbing.       Current Medications:     albuterol-ipratropium 2.5mg-0.5mg/3mL  3 mL Nebulization Q6H WAKE    aspirin  81 mg Per G Tube Daily    atorvastatin  40 mg Per G Tube Daily    bisacodyl  10 mg Rectal Once    ergocalciferol  50,000 Units Per G Tube Q7 Days    famotidine  20 mg Per G Tube Daily    folic acid  1 mg Per G Tube Daily    heparin (porcine)  5,000 Units Subcutaneous Q8H    hydrALAZINE  100 mg Per G Tube TID    levETIRAcetam  500 mg Per G Tube Q12H    metoprolol tartrate  50 mg Per G Tube BID    NIFEdipine  60 mg Oral BID    sodium bicarbonate  1 tablet Oral BID     Current Laboratory Results:    Recent Results (from the past 24 hour(s))   PTH, intact    Collection Time: 02/26/18  5:36 AM   Result Value Ref Range    PTH, Intact 152.5 (H) 9.0 - 77.0 pg/mL   Ferritin    Collection Time: 02/26/18  5:36 AM   Result Value Ref Range    Ferritin 304 (H) 20.0 - 300.0 ng/mL   Iron and TIBC    Collection Time: 02/26/18  5:36 AM   Result Value Ref Range    Iron 36 30 - 160 ug/dL    Transferrin 120 (L) 200 - 375 mg/dL    TIBC 178 (L) 250 - 450 ug/dL    Saturated Iron 20 20 - 50 %   Magnesium    Collection Time: 02/26/18  5:37  AM   Result Value Ref Range    Magnesium 2.0 1.6 - 2.6 mg/dL   Phosphorus    Collection Time: 02/26/18  5:37 AM   Result Value Ref Range    Phosphorus 3.8 2.7 - 4.5 mg/dL   CBC auto differential    Collection Time: 02/26/18  5:37 AM   Result Value Ref Range    WBC 5.61 3.90 - 12.70 K/uL    RBC 3.30 (L) 4.00 - 5.40 M/uL    Hemoglobin 9.4 (L) 12.0 - 16.0 g/dL    Hematocrit 29.2 (L) 37.0 - 48.5 %    MCV 89 82 - 98 fL    MCH 28.5 27.0 - 31.0 pg    MCHC 32.2 32.0 - 36.0 g/dL    RDW 17.0 (H) 11.5 - 14.5 %    Platelets 159 150 - 350 K/uL    MPV 9.8 9.2 - 12.9 fL    Gran # (ANC) 3.6 1.8 - 7.7 K/uL    Lymph # 1.2 1.0 - 4.8 K/uL    Mono # 0.4 0.3 - 1.0 K/uL    Eos # 0.4 0.0 - 0.5 K/uL    Baso # 0.03 0.00 - 0.20 K/uL    Gran% 63.7 38.0 - 73.0 %    Lymph% 21.9 18.0 - 48.0 %    Mono% 7.3 4.0 - 15.0 %    Eosinophil% 6.4 0.0 - 8.0 %    Basophil% 0.5 0.0 - 1.9 %    Differential Method Automated    Comprehensive metabolic panel    Collection Time: 02/26/18  5:37 AM   Result Value Ref Range    Sodium 144 136 - 145 mmol/L    Potassium 4.3 3.5 - 5.1 mmol/L    Chloride 113 (H) 95 - 110 mmol/L    CO2 20 (L) 23 - 29 mmol/L    Glucose 117 (H) 70 - 110 mg/dL    BUN, Bld 69 (H) 6 - 20 mg/dL    Creatinine 2.1 (H) 0.5 - 1.4 mg/dL    Calcium 9.2 8.7 - 10.5 mg/dL    Total Protein 6.9 6.0 - 8.4 g/dL    Albumin 3.5 3.5 - 5.2 g/dL    Total Bilirubin 0.5 0.1 - 1.0 mg/dL    Alkaline Phosphatase 75 55 - 135 U/L    AST 11 10 - 40 U/L    ALT 11 10 - 44 U/L    Anion Gap 11 8 - 16 mmol/L    eGFR if African American 29 (A) >60 mL/min/1.73 m^2    eGFR if non African American 25 (A) >60 mL/min/1.73 m^2   Vitamin D    Collection Time: 02/26/18  5:37 AM   Result Value Ref Range    Vit D, 25-Hydroxy 7 (L) 30 - 96 ng/mL   Uric acid    Collection Time: 02/26/18  5:37 AM   Result Value Ref Range    Uric Acid 8.8 (H) 2.4 - 5.7 mg/dL   Haptoglobin    Collection Time: 02/26/18 11:35 AM   Result Value Ref Range    Haptoglobin 198 30 - 250 mg/dL   Lactate dehydrogenase     Collection Time: 02/26/18 11:36 AM   Result Value Ref Range     110 - 260 U/L   Protein / creatinine ratio, urine    Collection Time: 02/26/18 12:00 PM   Result Value Ref Range    Protein, Urine Random 71 (H) 0 - 15 mg/dL    Creatinine, Random Ur 36.1 15.0 - 325.0 mg/dL    Prot/Creat Ratio, Ur 1.97 (H) 0.00 - 0.20     Current Imaging Results:    Imaging Results          US Lower Extremity Veins Left (Final result)  Result time 02/21/18 17:35:34    Final result by Anoop Perez MD (02/21/18 17:35:34)                 Impression:      No evidence of acute venous thrombosis of the left lower extremity.    Nonspecific edema in the left popliteal fossa.      Electronically signed by: ANOOP PEREZ MD  Date:     02/21/18  Time:    17:35              Narrative:    [id number]  02/21/18  16:20:00 [id number] ([facility]) : US LOWER EXTREMITY VEINS LEFT    SUPPLIED CLINICAL HISTORY:  Edema, eval for clot     ADDITIONAL CLINICAL HISTORY: N/A    TECHNIQUE: Duplex and color flow Doppler evaluation of the left lower extremities.    COMPARISON: None.    FINDINGS:  Left lower extremity has  no evidence of acute venous thrombosis in the common femoral, superficial femoral, greater saphenous, popliteal, peroneal, anterior tibial, and posterior tibial veins.  There is nonspecific edema in the left popliteal fossa.                             X-Ray Chest 1 View (Final result)  Result time 02/21/18 10:24:29    Final result by Donny Joseph MD (02/21/18 10:24:29)                 Impression:      Interval development of moderate right bilateral pulmonary consolidation predominantly in a central and basilar distribution suggestive of moderate pulmonary edema.  Pneumonia is considered less likely.  Poor visualization of the left hemidiaphragm and left costophrenic angle.  A small layering left-sided pleural effusion is suspected.  Prominence of the cardiac silhouette, stable.      Electronically signed by: DONNY JOSEPH MD  Date:      02/21/18  Time:    10:24              Narrative:    Comparison is made to prior examination dated 2/15/18.    A single AP radiograph of the chest was obtained.  The cardiac silhouette is prominent in size as before.  Atherosclerotic calcification is present within the aortic arch.  There is bilateral pulmonary consolidation identified predominantly within the mid to lower lungs more pronounced on the right than on the left.  Findings are suggestive of moderate pulmonary edema and this represents a detrimental change when compared to the prior examination.  Pneumonia is considered less likely but cannot be excluded.  There is poor visualization of the left hemidiaphragm.  A small layering left-sided pleural effusion is suspected.  There is no evidence for pneumothorax.  Bony structures are grossly intact.                              Assessment and Plan:     Problem List:    Active Diagnoses:    Diagnosis Date Noted POA    PRINCIPAL PROBLEM:  Acute respiratory failure with hypoxia [J96.01] 11/07/2016 Yes    Vitamin D deficiency [E55.9] 02/26/2018 Yes    Hypoxia [R09.02] 02/23/2018 Yes    Shortness of breath [R06.02] 02/21/2018 Yes    Wheezing [R06.2] 02/21/2018 Yes    Edema of left lower extremity [R60.0] 02/21/2018 Yes    Pneumonia due to infectious organism [J18.9] 02/21/2018 Yes    Anemia in stage 3 chronic kidney disease [N18.3, D63.1] 08/02/2017 Yes    Controlled type 2 diabetes mellitus with stage 3 chronic kidney disease, without long-term current use of insulin [E11.22, N18.3] 08/02/2017 Yes    History of stroke [Z86.73] 06/28/2017 Not Applicable    PEG (percutaneous endoscopic gastrostomy) status [Z93.1] 06/28/2017 Not Applicable     Chronic    Constipation [K59.00] 06/16/2017 Yes    Acute on chronic diastolic heart failure [I50.33] 11/07/2016 Yes    Stage 3 chronic kidney disease [N18.3] 03/25/2013 Yes    Hyperlipidemia [E78.5] 03/05/2013 Yes    Renovascular hypertension [I15.0]  03/05/2013 Yes     Chronic      Problems Resolved During this Admission:    Diagnosis Date Noted Date Resolved POA     Assessment and Plan:     1. Heart Failure, Diastolic, Acute on Chronic              2/21/2018: Echo: Normal left ventricular size and systolic function. Moderate LVH. Moderate diastolic dysfunction. Severely dilated LA. Mild to moderate MR. Small pericardial effusion.   2/24/2018: CXR: Still right upper infiltrate.   2/25/2018: Two units of PRBCs.   May need to continue diuresis.                 2. History of Cerebrovascular Accident              2016: R MCA CVA. Caused left hemiplegia.     3. Hypertension   On labetalol, hydralazine and amlodipine.              Severe.     4. History of Sarcoid              1990s: Diagnosed.    5. Anemia   Hct only 22%.    Received two PRBCs.   May benefit from erythropoietin.      VTE Risk Mitigation         Ordered     heparin (porcine) injection 5,000 Units  Every 8 hours     Route:  Subcutaneous        02/21/18 0302          Will Zavala MD  Cardiology  [facility]

## 2018-02-27 NOTE — PROGRESS NOTES
"Nephrology  Progress Note    Admit Date: 2/21/2018   LOS: 6 days     SUBJECTIVE:     Follow-up For:  Acute respiratory failure with hypoxia    Interval History:     Had muscle spasms in right leg last night.  Discussed with pt/daughter at bedside.  Ready to go home.  No CP/SOB.  Labs noted.       Review of Systems:  Constitutional: No fever or chills  Respiratory: No cough or shortness of breath  Cardiovascular: No chest pain or palpitations  Gastrointestinal: No nausea or vomiting  Neurological: No confusion or weakness    OBJECTIVE:     Vital Signs Range (Last 24H):  /60 (BP Location: Right arm, Patient Position: Lying)   Pulse 73   Temp 98.3 °F (36.8 °C) (Oral)   Resp 18   Ht 5' 3" (1.6 m)   Wt 77.3 kg (170 lb 6.7 oz)   LMP  (LMP Unknown)   SpO2 (!) 94%   Breastfeeding? No   BMI 30.19 kg/m²     Temp:  [97.2 °F (36.2 °C)-98.7 °F (37.1 °C)]   Pulse:  [61-77]   Resp:  [18-24]   BP: (133-170)/(60-77)   SpO2:  [89 %-100 %]     I & O (Last 24H):    Intake/Output Summary (Last 24 hours) at 02/27/18 1107  Last data filed at 02/27/18 1000   Gross per 24 hour   Intake              696 ml   Output              500 ml   Net              196 ml       Physical Exam:  General appearance: Well developed, well nourished  Eyes:  Conjunctivae/corneas clear. PERRL.  Lungs: Normal respiratory effort,   clear to auscultation bilaterally   Heart: Regular rate and rhythm, S1, S2 normal, no murmur, rub or ragini.  Abdomen: Soft, non-tender non-distended; bowel sounds normal; no masses,  no organomegaly, PEG.    Extremities: No cyanosis or clubbing. No edema.    Skin: Skin color, texture, turgor normal. No rashes or lesions  Neurologic: left weakness secondary to CVA    Laboratory Data:    Recent Labs  Lab 02/27/18  0534   WBC 5.33   RBC 3.14*   HGB 8.9*   HCT 28.1*      MCV 90   MCH 28.3   MCHC 31.7*       BMP:     Recent Labs  Lab 02/27/18  0534   *      K 5.3*   *   CO2 21*   BUN 71* "   CREATININE 2.2*   CALCIUM 8.9   MG 1.8     Lab Results   Component Value Date    CALCIUM 8.9 02/27/2018    PHOS 3.9 02/27/2018               Medications:  Medication list was reviewed and changes noted under Assessment/Plan.    Diagnostic Results:        ASSESSMENT/PLAN:     1. Labile DARLING due to cardiorenal syndrome on CKD stage 3, resolved Acute on Chronic Diastolic CHF/ Pulmonary HTN/ Sarcoidosis     -She appears clinically euvolemic.  -Baseline Cr over past few years 1.9-2.2 and now likely at baseline.   -She has not followed up with Dr. Blank in a very long time and needs to do so.  -start lasix po.    -She also needs dietary education for low Na diet.  -Need I/O's.   -low K diet.  -Added NaHCO3 650 mg po bid for Chronic NAGMA  -Renally dose meds, avoid nephrotoxins, and monitor I/O's closely.       Anemia of CKD  -Fe studies ok.  SPIEP pending. Recent TSH, B12, Folate appear normal  -She had a colonoscopy in 8/2016 with a few polyps and a EGD in 12 17 with no source of blood loss.  -PRBC's given with appropriate rise.   -Hapto/LDH wnl.  -will need weekly Epo.  Start now.     Severe Vit D Def with Mild HPTH (E55.9, N25.81):  -ergo for now.      HTN/ HCVD/ Chronic S[place]ana CHF/Pulm HTN  -BP not goal   -changed to Nifedipine to 60mg BID.  -Has Hx of angioedema from Ace, but may wish to discss trial of ARB although there is some risk.  -Can also add coreg is this is beneficial in proteinuria     DM2 with likely nephropathy  -appears to be well controlled with HgbA1C 4.8%  -Previously in 2010-11 she was poorly controlled      See above.   Ok to DC from renal  Needs f/u with Dr. Blank at [facility].

## 2018-02-27 NOTE — PLAN OF CARE
Problem: Patient Care Overview  Goal: Plan of Care Review  Outcome: Ongoing (interventions implemented as appropriate)  Tolerating rx and Bipap well.

## 2018-02-27 NOTE — PLAN OF CARE
[facility]    HOME HEALTH ORDERS  FACE TO FACE ENCOUNTER    Patient Name: Lucy Perez  YOB: 1960    PCP: Beverly Muniz MD   PCP Address: [address] / [place] [address]  PCP Phone Number: [phone]  PCP Fax: [phone]    Encounter Date: 02/27/2018    Admit to Home Health    Diagnoses:  Active Hospital Problems    Diagnosis  POA    *Acute respiratory failure with hypoxia [J96.01]  Yes    Vitamin D deficiency [E55.9]  Yes    Hypoxia [R09.02]  Yes    Shortness of breath [R06.02]  Yes    Wheezing [R06.2]  Yes    Edema of left lower extremity [R60.0]  Yes    Pneumonia due to infectious organism [J18.9]  Yes    Anemia in stage 3 chronic kidney disease [N18.3, D63.1]  Yes    Controlled type 2 diabetes mellitus with stage 3 chronic kidney disease, without long-term current use of insulin [E11.22, N18.3]  Yes    History of stroke [Z86.73]  Not Applicable     s/p R-MCA stroke with R-putaminal hemorrhagic transformation in 8/2016 and 11/2016 (s/p hemicraniotomy at [facility]) with residual L hemiparesis, on AED s/p CVA        PEG (percutaneous endoscopic gastrostomy) status [Z93.1]  Not Applicable     Chronic    Constipation [K59.00]  Yes    Acute on chronic diastolic heart failure [I50.33]  Yes    Stage 3 chronic kidney disease [N18.3]  Yes    Hyperlipidemia [E78.5]  Yes    Renovascular hypertension [I15.0]  Yes     Chronic     Will resume home medications: amlodipine, labetalol, and hydralazine  Will hold Lisinopril in setting of DARLING.        Resolved Hospital Problems    Diagnosis Date Resolved POA   No resolved problems to display.       Future Appointments  Date Time Provider Department Center   3/1/2018 10:10 AM LAB, HEMONC CANCER BLDG [facility] LAB HO Mario [facility]   3/1/2018 11:00 AM Mike Owen MD [facility] BM CH[facility]   4/26/2018 9:00 AM LAB, APPOINTMENT [facility] LAB V[facility]   5/3/2018 10:30 AM Beverly Muniz MD [facility]  ODALYS Serrano     Follow-up Information     Beverly Muniz MD.    Specialty:  Internal Medicine  Contact information:  [address]  [place] [id number] [phone]             Robb Blank MD.    Specialty:  Nephrology  Why:  Follow up for the next available appointment  Contact information:  [address] [facility] [id number] [phone]             Mike Owen MD.    Specialty:  Hematology and Oncology  Why:  As scheduled  Contact information:  [address] [facility] [id number] [phone]             [facility] In 1 day.    Specialty:  Home Health Services  Why:  Home Health  Contact information:  [address]  [address]  [facility] [address] [phone]                     I have seen and examined this patient face to face today. My clinical findings that support the need for the home health skilled services and home bound status are the following:  Weakness/numbness causing balance and gait disturbance due to stroke making it taxing to leave home.    Allergies:  Review of patient's allergies indicates:   Allergen Reactions    Lisinopril Other (See Comments)     Angioedema      Vicodin [hydrocodone-acetaminophen] Rash       Diet:  Diabetic low potassium low sodium    Activities: As tolerated    Nursing:   SN to complete comprehensive assessment including routine vital signs. Instruct on disease process and s/s of complications to report to MD. Review/verify medication list sent home with the patient at time of discharge  and instruct patient/caregiver as needed. Frequency may be adjusted depending on start of care date.    Notify MD if SBP > 160 or < 90; DBP > 90 or < 50; HR > 120 or < 50; Temp > 101    CONSULTS:    Physical Therapy to evaluate and treat. Evaluate for home safety and equipment needs; Establish/upgrade home exercise program. Perform / instruct on therapeutic exercises, gait training, transfer training, and Range of  "Motion.  Occupational Therapy to evaluate and treat. Evaluate home environment for safety and equipment needs. Perform/Instruct on transfers, ADL training, ROM, and therapeutic exercises.  Aide to provide assistance with personal care, ADLs, and vital signs.    Medications: Review discharge medications with patient and family and provide education.      Current Discharge Medication List      START taking these medications    Details   carvedilol (COREG) 12.5 MG tablet Take 1 tablet (12.5 mg total) by mouth 2 (two) times daily with meals.  Qty: 60 tablet, Refills: 3      ergocalciferol (ERGOCALCIFEROL) 50,000 unit Cap 1 capsule (50,000 Units total) by Per G Tube route every 7 days.  Qty: 4 capsule, Refills: 3      furosemide (LASIX) 40 MG tablet Take 1 tablet (40 mg total) by mouth once daily.  Qty: 30 tablet, Refills: 3      NIFEdipine (ADALAT CC) 90 MG TbSR Take 1 tablet (90 mg total) by mouth once daily.  Qty: 30 tablet, Refills: 3      sodium bicarbonate 650 MG tablet Take 2 tablets (1,300 mg total) by mouth 2 (two) times daily.  Qty: 120 tablet, Refills: 11         CONTINUE these medications which have NOT CHANGED    Details   albuterol (PROVENTIL) 2.5 mg /3 mL (0.083 %) nebulizer solution Take 3 mLs (2.5 mg total) by nebulization every 6 (six) hours as needed for Wheezing. Rescue  Qty: 25 each, Refills: 3    Associated Diagnoses: Chest congestion      aspirin (ECOTRIN) 81 MG EC tablet Take 81 mg by mouth once daily.        atorvastatin (LIPITOR) 40 MG tablet TAKE 1 TABLET ONE TIME DAILY FOR CHOLESTEROL  Qty: 90 tablet, Refills: 2    Associated Diagnoses: Pure hypercholesterolemia      BD INSULIN PEN NEEDLE UF SHORT 31 gauge x 5/16" Ndle USE TO INJECT NOVOLOG FLEXPEN BEFORE MEALS  Qty: 150 each, Refills: 11      blood sugar diagnostic (ACCU-CHEK SMARTVIEW TEST STRIP) Strp 1 strip by Misc.(Non-Drug; Combo Route) route 2 (two) times daily.  Qty: 300 each, Refills: 3    Associated Diagnoses: Type 2 diabetes " mellitus with chronic kidney disease      dantrolene (DANTRIUM) 25 MG Cap 3 caps 3x/day  Qty: 180 capsule, Refills: 0    Associated Diagnoses: Muscle spasticity      famotidine (PEPCID) 20 MG tablet TAKE 1 TABLET (20 MG TOTAL) BY MOUTH ONCE DAILY.  Qty: 90 tablet, Refills: 3    Associated Diagnoses: Hemorrhagic cerebrovascular accident (CVA)      ferrous sulfate 220 mg (44 mg iron)/5 mL solution 10ml daily for Iron/Give via PEG  Qty: 300 mL, Refills: 6    Associated Diagnoses: Anemia, unspecified type      hydrALAZINE (APRESOLINE) 100 MG tablet Take 1 tablet (100 mg total) by mouth 3 (three) times daily.  Qty: 270 tablet, Refills: 3    Associated Diagnoses: Essential hypertension      levetiracetam (KEPPRA) 500 MG Tab Take 1 tablet (500 mg total) by mouth every 8 (eight) hours.  Qty: 90 tablet, Refills: 0    Associated Diagnoses: Hemorrhagic cerebrovascular accident (CVA)      multivitamin with iron Tab 1/day  Qty: 30 each, Refills: prn      pregabalin (LYRICA) 25 MG capsule 1 cap in AM and 2 caps at Bedtime for muscle spasm  Qty: 90 capsule, Refills: 6    Associated Diagnoses: Muscle spasticity      folic acid (FOLVITE) 1 MG tablet Take 1 tablet (1 mg total) by mouth once daily.  Qty: 90 tablet, Refills: 3    Associated Diagnoses: Folate deficiency         STOP taking these medications       amlodipine (NORVASC) 10 MG tablet Comments:   Reason for Stopping:         hydroCHLOROthiazide (HYDRODIURIL) 25 MG tablet Comments:   Reason for Stopping:         labetalol (NORMODYNE) 100 MG tablet Comments:   Reason for Stopping:               I certify that this patient is confined to her home and needs intermittent skilled nursing care and physical therapy.

## 2018-02-27 NOTE — PLAN OF CARE
Problem: Patient Care Overview  Goal: Plan of Care Review  Outcome: Ongoing (interventions implemented as appropriate)   02/27/18 0629   Coping/Psychosocial   Plan Of Care Reviewed With patient       Comments: Plan of care reviewed with the patient and her  at the bedside. A&Ox4 with delayed response.Vitals stable on 2L NC with BiPAP at HS. Pain well-controlled with PRN tylenol. Rested peacefully throughout the night. Turned Q2H to prevent pressure injury. Bed alarm on and instructed to call for assistance. Patient remains free from injury. Multiple episodes of incontinence throughout the shift. Unable to obtain accurate I&Os. Instructed to call for the bedpan if possible. Will continue to monitor.    Isha Ho RN  2/27/2018  6:34 AM

## 2018-02-28 ENCOUNTER — TELEPHONE (OUTPATIENT)
Dept: INTERNAL MEDICINE | Facility: CLINIC | Age: 58
End: 2018-02-28

## 2018-02-28 PROBLEM — N18.4 CONTROLLED TYPE 2 DIABETES MELLITUS WITH STAGE 4 CHRONIC KIDNEY DISEASE, WITHOUT LONG-TERM CURRENT USE OF INSULIN: Status: ACTIVE | Noted: 2017-08-02

## 2018-02-28 PROBLEM — N18.4 ANEMIA IN STAGE 4 CHRONIC KIDNEY DISEASE: Status: ACTIVE | Noted: 2017-08-02

## 2018-02-28 NOTE — DISCHARGE SUMMARY
[facility] Hospital Medicine  Discharge Summary      Patient Name: Lucy Perez  MRN: [id number]  Admission Date: 2018  Hospital Length of Stay: 6 days  Discharge Date and Time: 2018  5:40 PM  Attending Physician: No att. 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  No NVD, but reports constipation last BM 3 days ago.  Feels a little better on BiPap.       AB.29 / 35 / 79 on BiPap 10/5 30%.   CXR shows new opacity on right more so versus previous, and reports suggest edema.  BNP is 546.              Hospital Course:   Patient was admitted with acute shortness of breath with chest x-ray showing pulmonary edema. She was seen by the pulmonary/critical care service and cardiology and treated with aggressive diuresis.  Lower extremity edema and shortness of breath improved significantly.  2D echo showed grade II diastolic dysfunction with elevated PA pressure and severe left atrial enlargement.  LVEF was preserved.  After an initial period of improvement she started having worsening shortness of breath again.  She was noted to have chronically low H/H, which was thought due to CKD IV.  She was seen by the nephrology service and was treated for metabolic acidosis with bicarbonate and blood pressure control.  She was transfused with 2 units of PRBCs as well.  At discharge she had a significant improvement in shortness of breath and was anxious to go home.  Home health will be resumed.    Notably she has not followed up with her nephrologist in some time and we recommend she see him as  outpatient as soon as possible.       Consults:   Consults         Status Ordering Provider     Inpatient consult to Cardiology  Once     Provider:  Will Zavala MD    Completed AMENA ROLLE     Inpatient consult to Nephrology-[facility]  Once     Provider:  Zoe Aly MD    Completed AMENA ROLLE     Inpatient consult to Pulmonary Critical Care  Once     Provider:  Dio Belle MD    Completed AMENA ROLLE     Inpatient consult to Registered Dietitian/Nutritionist  Once     Provider:  (Not yet assigned)    Completed AMENA ROLLE            Final Active Diagnoses:    Diagnosis Date Noted POA    PRINCIPAL PROBLEM:  Acute respiratory failure with hypoxia [J96.01] 11/07/2016 Yes    Acute on chronic diastolic heart failure [I50.33] 11/07/2016 Yes    Renovascular hypertension [I15.0] 03/05/2013 Yes     Chronic    Stage 4 chronic kidney disease [N18.4] 03/25/2013 Yes    Vitamin D deficiency [E55.9] 02/26/2018 Yes    Anemia in stage 4 chronic kidney disease [N18.4, D63.1] 08/02/2017 Yes    Controlled type 2 diabetes mellitus with stage 4 chronic kidney disease, without long-term current use of insulin [E11.22, N18.4] 08/02/2017 Yes    History of stroke [Z86.73] 06/28/2017 Not Applicable    PEG (percutaneous endoscopic gastrostomy) status [Z93.1] 06/28/2017 Not Applicable     Chronic      Problems Resolved During this Admission:    Diagnosis Date Noted Date Resolved POA       Discharged Condition: stable    Disposition: Home or Self Care    Follow Up:  Follow-up Information     Beverly Muniz MD.    Specialty:  Internal Medicine  Contact information:  [address]  [facility] [address]  [phone]             Robb Blank MD.    Specialty:  Nephrology  Why:  Follow up for the next available appointment  Contact information:  [address]  [facility] [address]  [phone]             Mike Owen MD.    Specialty:  Hematology and Oncology  Why:  As scheduled  Contact  information:  [address] [facility] [address]  [phone]             [facility] [place] In 1 day.    Specialty:  Home Health Services  Why:  Home Health  Contact information:  [address]  [address]  [place] [id number] [phone]                 Patient Instructions:     Activity as tolerated         Significant Diagnostic Studies:  2D Echocardiogram  Aorta: The aortic root is normal in size, measuring 2.2 cm at sinotubular junction and 2.7 cm at Sinuses of Valsalva. The proximal ascending aorta is normal in size, measuring 2.5 cm across.     Left Atrium: The left atrial volume index is severely enlarged, measuring 64.27 cc/m2.     Left Ventricle: The left ventricle is normal in size, with an end-diastolic diameter of 4.5 cm, and an end-systolic diameter of 2.9 cm. Wall thickness is increased, with the septum measuring 1.3 cm and the posterior wall measuring 1.4 cm across. Relative   wall thickness was increased at 0.62, and the LV mass index was increased at 159.0 g/m2 consistent with concentric left ventricular hypertrophy. There are no regional wall motion abnormalities. Left ventricular systolic function appears normal. Visually   estimated ejection fraction is 60-65%. The LV Doppler derived stroke volume equals 114.0 ccs.     Diastolic indices: E wave velocity 1.4 m/s, E/A ratio 1.1,  msec., E/e' ratio(avg) 23. There is pseudonormalization of mitral inflow pattern consistent with significant diastolic dysfunction.     Right Atrium: The right atrium is normal in size, measuring 5.5 cm in length and 3.9 cm in width in the apical view.     Right Ventricle: The right ventricle is normal in size. Global right ventricular systolic function appears normal. Tricuspid annular plane systolic excursion (TAPSE) is 2.2 cm. The estimated PA systolic pressure is greater than 41 mmHg.     Aortic Valve:  The aortic valve is mildly sclerotic. The peak velocity obtained across the aortic  valve is 2.11 m/s, which translates to a peak gradient of 18 mmHg. The mean gradient is 9 mmHg. Using a left ventricular outflow tract diameter of 2.0 cm, a   left ventricular outflow tract velocity time integral of 35 cm, and a peak instantaneous transvalvular velocity time integral of 52 cm, the calculated aortic valve area is 2.2 cm2(AVAi is 1.25 cm2/m2).     Mitral Valve:  There is mild to moderate mitral regurgitation. There is mitral annular calcification.     Tricuspid Valve:  There is mild to moderate tricuspid regurgitation.     Pericardium: There is evidence of a small circumferential pericardial effusion.     Intracavitary: There is no evidence of intracavity mass, thrombi, or vegetation.     CONCLUSIONS     1 - Severe left atrial enlargement.     2 - Concentric hypertrophy.     3 - No wall motion abnormalities.     4 - Normal left ventricular systolic function (EF 60-65%).     5 - Impaired LV relaxation, elevated LAP (grade 2 diastolic dysfunction).     6 - Pulmonary hypertension. The estimated PA systolic pressure is greater than 41 mmHg.     7 - Mild to moderate mitral regurgitation.     8 - Small pericardial effusion.     This document has been electronically    SIGNED BY: Cuba Phillips MD On: 02/21/2018     Medications:  Reconciled Home Medications:   Discharge Medication List as of 2/27/2018  3:45 PM      START taking these medications    Details   carvedilol (COREG) 12.5 MG tablet Take 1 tablet (12.5 mg total) by mouth 2 (two) times daily with meals., Starting Tue 2/27/2018, Until Wed 2/27/2019, Normal      ergocalciferol (ERGOCALCIFEROL) 50,000 unit Cap 1 capsule (50,000 Units total) by Per G Tube route every 7 days., Starting Mon 3/5/2018, Normal      furosemide (LASIX) 40 MG tablet Take 1 tablet (40 mg total) by mouth once daily., Starting Tue 2/27/2018, Until Wed 2/27/2019, Normal      NIFEdipine (ADALAT CC) 90 MG TbSR Take 1 tablet (90 mg total) by mouth once daily., Starting Tue  "2/27/2018, Until Wed 2/27/2019, Normal      sodium bicarbonate 650 MG tablet Take 2 tablets (1,300 mg total) by mouth 2 (two) times daily., Starting Tue 2/27/2018, Until Wed 2/27/2019, OTC         CONTINUE these medications which have NOT CHANGED    Details   albuterol (PROVENTIL) 2.5 mg /3 mL (0.083 %) nebulizer solution Take 3 mLs (2.5 mg total) by nebulization every 6 (six) hours as needed for Wheezing. Rescue, Starting Wed 8/16/2017, Until Thu 8/16/2018, Normal      aspirin (ECOTRIN) 81 MG EC tablet Take 81 mg by mouth once daily.  , Until Discontinued, Historical Med      atorvastatin (LIPITOR) 40 MG tablet TAKE 1 TABLET ONE TIME DAILY FOR CHOLESTEROL, Normal      BD INSULIN PEN NEEDLE UF SHORT 31 gauge x 5/16" Ndle USE TO INJECT NOVOLOG FLEXPEN BEFORE MEALS, Normal      blood sugar diagnostic (ACCU-CHEK SMARTVIEW TEST STRIP) Strp 1 strip by Misc.(Non-Drug; Combo Route) route 2 (two) times daily., Starting 11/5/2015, Until Discontinued, Normal      dantrolene (DANTRIUM) 25 MG Cap 3 caps 3x/day, No Print      famotidine (PEPCID) 20 MG tablet TAKE 1 TABLET (20 MG TOTAL) BY MOUTH ONCE DAILY., Normal      ferrous sulfate 220 mg (44 mg iron)/5 mL solution 10ml daily for Iron/Give via PEG, Phone In      folic acid (FOLVITE) 1 MG tablet Take 1 tablet (1 mg total) by mouth once daily., Starting Thu 9/14/2017, Until Fri 9/14/2018, Normal      hydrALAZINE (APRESOLINE) 100 MG tablet Take 1 tablet (100 mg total) by mouth 3 (three) times daily., Starting 2/22/2017, Until Discontinued, Normal      levetiracetam (KEPPRA) 500 MG Tab Take 1 tablet (500 mg total) by mouth every 8 (eight) hours., Starting Thu 8/10/2017, Until Fri 8/10/2018, No Print      multivitamin with iron Tab 1/day, No Print      pregabalin (LYRICA) 25 MG capsule 1 cap in AM and 2 caps at Bedtime for muscle spasm, Print         STOP taking these medications       amlodipine (NORVASC) 10 MG tablet Comments:   Reason for Stopping:         hydroCHLOROthiazide " (HYDRODIURIL) 25 MG tablet Comments:   Reason for Stopping:         labetalol (NORMODYNE) 100 MG tablet Comments:   Reason for Stopping:               Indwelling Lines/Drains at time of discharge:   Lines/Drains/Airways     Drain                 Gastrostomy/Enterostomy Percutaneous endoscopic gastrostomy (PEG) LUQ -- days                Time spent on the discharge of patient: >30 minutes  Patient was seen and examined on the date of discharge and determined to be suitable for discharge.         Clementine Mahmood MD  Department of Hospital Medicine  [facility]

## 2018-02-28 NOTE — TELEPHONE ENCOUNTER
Please forward this important TCC information to your provider in order to maximize the post discharge care delivery of this patient.     C3 nurse attempted to contact Lucy for a TCC post hospital discharge follow up call. No answer. C3 nurse left a voice message and OOC # given. The patient does not have a HOSFU appointment with a Provider within 7-14 days post hospital discharge date 02/27/18. Please contact patient and schedule follow up appointment using HOSFU visit type on or before 03/14/18.     Respectfully,   Mariah Lamb LPN   Care Coordination [place] C3   [email or web address]   (Routing comment)         Mariah Lamb LPN 3 minutes ago (3:26 PM)         C3 nurse attempted to contact patient. No answer. The following message was left for the patient to return the call:  Good evening I am a nurse calling on behalf of [facility] from the Care Coordination Center.  This is a Transitional Care Call for Lucy. When you have a moment please contact us at [phone] or [phone] Monday through Friday, between the hours of 8 am to 4 pm. We look forward to speaking with you. On behalf of [facility] have a nice day.     The patient does not have a scheduled HOSFU appointment within 7-14 days post hospital discharge date 02/27/18. Message sent to Physician staff to assist with HOSFU appointment scheduling.                 plz advise, seems like they are trying to get her a sooner appointment.    ALMA Maynard

## 2018-02-28 NOTE — TELEPHONE ENCOUNTER
Aleyda/Abby, please call (call pt's daughter, Reina)and schedule pt to [facility] for hospital follow up appt.  Thanks

## 2018-03-01 NOTE — TELEPHONE ENCOUNTER
Left message for daughter, or Ms Ayala to give me a call back in reference to scheduling an appointment for [facility]. Per pcp    ALMA Maynard

## 2018-03-05 ENCOUNTER — TELEPHONE (OUTPATIENT)
Dept: HEMATOLOGY/ONCOLOGY | Facility: CLINIC | Age: 58
End: 2018-03-05

## 2018-03-05 ENCOUNTER — TELEPHONE (OUTPATIENT)
Dept: INTERNAL MEDICINE | Facility: CLINIC | Age: 58
End: 2018-03-05

## 2018-03-05 NOTE — TELEPHONE ENCOUNTER
Aleyda/Cecy, please call and schedule pt for the [facility] for Hospital follow up.  Please ask the  nurse to keep monitoring BPs and call with a report in next 1-2 days and make sure pt is taking for BP: #1-Coreg 12.5mg  BID,#2-Hydralazine 100mg TID,#3-Lasix 40mg QD,#4-Nifedipine 90mg QD.Thanks

## 2018-03-05 NOTE — TELEPHONE ENCOUNTER
Spoke with Pam from [facility], they are to continue to monitor patients b/p, and report in next 2 days, and make sure patient is taking medication for b/p.

## 2018-03-05 NOTE — TELEPHONE ENCOUNTER
Received: Today   Message Contents   ALMA Zabala Staff   Caller: Jenine/[facility] [phone] (Today, 11:21 AM)             FYI:      took patients  Bp today 03/05/18 it was : 158/100 no symptoms

## 2018-03-05 NOTE — TELEPHONE ENCOUNTER
Attempted to call patient.  Voice mail left to return call.        ----- Message from Mike Owen MD sent at 3/2/2018  4:30 PM CST -----  Ms. Perez missed her most recent appointment. Can we try to follow-up with her and reschedule at her convenience? This is not an urgent appointment. Thanks.

## 2018-03-05 NOTE — TELEPHONE ENCOUNTER
----- Message from Mike Owen MD sent at 3/2/2018  4:30 PM CST -----  Ms. Perez missed her most recent appointment. Can we try to follow-up with her and reschedule at her convenience? This is not an urgent appointment. Thanks.

## 2018-03-07 ENCOUNTER — TELEPHONE (OUTPATIENT)
Dept: HEMATOLOGY/ONCOLOGY | Facility: CLINIC | Age: 58
End: 2018-03-07

## 2018-03-07 DIAGNOSIS — I61.9 HEMORRHAGIC CEREBROVASCULAR ACCIDENT (CVA): ICD-10-CM

## 2018-03-07 RX ORDER — LEVETIRACETAM 500 MG/1
500 TABLET ORAL EVERY 8 HOURS
Qty: 90 TABLET | Refills: 4 | Status: SHIPPED | OUTPATIENT
Start: 2018-03-07 | End: 2018-03-08 | Stop reason: SDUPTHER

## 2018-03-08 ENCOUNTER — TELEPHONE (OUTPATIENT)
Dept: INTERNAL MEDICINE | Facility: CLINIC | Age: 58
End: 2018-03-08

## 2018-03-08 DIAGNOSIS — I61.9 HEMORRHAGIC CEREBROVASCULAR ACCIDENT (CVA): ICD-10-CM

## 2018-03-08 RX ORDER — LEVETIRACETAM 500 MG/1
500 TABLET ORAL EVERY 8 HOURS
Qty: 90 TABLET | Refills: 0 | Status: SHIPPED | OUTPATIENT
Start: 2018-03-08 | End: 2018-03-08 | Stop reason: SDUPTHER

## 2018-03-08 RX ORDER — LEVETIRACETAM 500 MG/1
500 TABLET ORAL EVERY 8 HOURS
Qty: 90 TABLET | Refills: 6 | Status: SHIPPED | OUTPATIENT
Start: 2018-03-08 | End: 2018-04-13 | Stop reason: SDUPTHER

## 2018-03-08 NOTE — TELEPHONE ENCOUNTER
Received: Today    Results, Refill, Pt Advice, Same Day Access Requested   Message Contents   Ernestina SORIA CristyDallas CRUMP Staff   Caller: Maye/Daughter/[phone] (Today,  1:13 PM)             Pt needs levETIRAcetam (KEPPRA) 500 MG Tab sent to [facility] [address] [place] [address]. It was sent to [facility] it was supposed to be sent to her local [facility]. Pt is almost out of medication will be out very soon.  Please advise.

## 2018-03-14 ENCOUNTER — TELEPHONE (OUTPATIENT)
Dept: HEMATOLOGY/ONCOLOGY | Facility: CLINIC | Age: 58
End: 2018-03-14

## 2018-03-14 NOTE — TELEPHONE ENCOUNTER
Attempted to call patient.  No answer          ----- Message from Mike Owen MD sent at 3/2/2018  4:30 PM CST -----  Ms. Perez missed her most recent appointment. Can we try to follow-up with her and reschedule at her convenience? This is not an urgent appointment. Thanks.

## 2018-03-16 ENCOUNTER — TELEPHONE (OUTPATIENT)
Dept: INTERNAL MEDICINE | Facility: CLINIC | Age: 58
End: 2018-03-16

## 2018-03-16 ENCOUNTER — PES CALL (OUTPATIENT)
Dept: ADMINISTRATIVE | Facility: CLINIC | Age: 58
End: 2018-03-16

## 2018-03-16 NOTE — TELEPHONE ENCOUNTER
----- Message from Mahnaz Evans sent at 3/16/2018  2:10 PM CDT -----  Contact: Francheska/[facility]/ [phone]  Patient need orders for a gel mattress sent to [facility] @ [id number].

## 2018-03-16 NOTE — TELEPHONE ENCOUNTER
----- Message from Maricarmen Phelan sent at 2/26/2018  9:48 AM CST -----  Received order for air mattress, pt would have had to received her hospital bed from [facility] to qualify for the mattress. Check with the pt to see what company she received her bed and mattress order to them.

## 2018-03-19 ENCOUNTER — PATIENT MESSAGE (OUTPATIENT)
Dept: HEMATOLOGY/ONCOLOGY | Facility: CLINIC | Age: 58
End: 2018-03-19

## 2018-03-20 NOTE — TELEPHONE ENCOUNTER
Cecy, I have written script for Gel mattress to be faxed to [facility]. Script is on your desk.  Thanks

## 2018-03-22 ENCOUNTER — TELEPHONE (OUTPATIENT)
Dept: INTERNAL MEDICINE | Facility: CLINIC | Age: 58
End: 2018-03-22

## 2018-03-28 ENCOUNTER — TELEPHONE (OUTPATIENT)
Dept: INTERNAL MEDICINE | Facility: CLINIC | Age: 58
End: 2018-03-28

## 2018-03-28 NOTE — TELEPHONE ENCOUNTER
----- Message from Malissa Pacheco sent at 3/28/2018  3:45 PM CDT -----  Contact: Hadley/[facility] [phone]  Patient has 2 blisters on left butt lock. Nurse applied cream to the area. Wants to know if that was ok to do.     Please call and advise.    Thank You

## 2018-03-28 NOTE — TELEPHONE ENCOUNTER
Denise, please call Hadley and let him know that topical moisture barrier cream was ok; does he think pt needs the Wound Care nurse to come out.  If so, that's fine too.  Thanks.

## 2018-03-28 NOTE — TELEPHONE ENCOUNTER
Malissa Cuellar Staff   Caller: Hadley/[facility] [phone] (Today,  3:45 PM)             Patient has 2 blisters on left butt lock. Nurse applied cream to the area. Wants to know if that was ok to do.     Please call and advise.     Thank You

## 2018-03-29 NOTE — TELEPHONE ENCOUNTER
Spoke to Hadley and informed and Hadlye states he will continue to use the cream and turn her more to prevent blisters.

## 2018-04-13 DIAGNOSIS — I61.9 HEMORRHAGIC CEREBROVASCULAR ACCIDENT (CVA): ICD-10-CM

## 2018-04-13 RX ORDER — LEVETIRACETAM 500 MG/1
500 TABLET ORAL EVERY 8 HOURS
Qty: 90 TABLET | Refills: 6 | Status: SHIPPED | OUTPATIENT
Start: 2018-04-13 | End: 2018-04-16 | Stop reason: SDUPTHER

## 2018-04-13 NOTE — TELEPHONE ENCOUNTER
----- Message from Malissa Tara sent at 4/13/2018  3:46 PM CDT -----  Contact: Patient [phone]  Rx Name: levETIRAcetam (KEPPRA) 500 MG Tab    Refill: yes    Pharmacy: [facility] [address]    Comments:    Please call and advise.    Thank You

## 2018-04-16 DIAGNOSIS — I61.9 HEMORRHAGIC CEREBROVASCULAR ACCIDENT (CVA): ICD-10-CM

## 2018-04-16 RX ORDER — LEVETIRACETAM 500 MG/1
500 TABLET ORAL EVERY 8 HOURS
Qty: 90 TABLET | Refills: 6 | Status: SHIPPED | OUTPATIENT
Start: 2018-04-16 | End: 2018-07-25 | Stop reason: SDUPTHER

## 2018-04-16 NOTE — TELEPHONE ENCOUNTER
----- Message from Salma Ivory sent at 4/16/2018  1:40 PM CDT -----  Contact: patient [phone]  Pt called on Friday to request a refill of levETIRAcetam (KEPPRA) 500 MG Tab and requested that the medication be called to [facility] but it was sent to [facility] mail order pharmacy instead. She wants the file to be changed where [facility] is the primary so this won't happen again.  This is the second time this happened and patient is out of meds.     Pt would also like to know the name of the head nurse      Maximo Kapadia [facility] [phone]

## 2018-04-17 ENCOUNTER — TELEPHONE (OUTPATIENT)
Dept: INTERNAL MEDICINE | Facility: CLINIC | Age: 58
End: 2018-04-17

## 2018-04-17 NOTE — TELEPHONE ENCOUNTER
----- Message from Ernestina Palma sent at 4/17/2018  1:38 PM CDT -----  Contact: self/[phone]  Pt states that her feeding tub had blood around it when she went to clean it. Please advise.      Thanks

## 2018-04-17 NOTE — TELEPHONE ENCOUNTER
Spoke to pt's daughter and states while cleaning the feeding tube they see red blood and a blood clot around tubing.

## 2018-04-17 NOTE — TELEPHONE ENCOUNTER
Spoke with Reina ruiz her Mom's PEG issues. She  noticed a small blood clot when cleaning the site but no active bleeding. There is a ?able white drainage at the site but no pain, no swelling or erythema. 'Will get HH nurse to come out and evaluate as pt still has active [facility].  Cecy, please call [facility] and ask that the nurse come out and evaluate pt's PEG site tomorrow if possible and report back.  Thanks

## 2018-04-18 ENCOUNTER — TELEPHONE (OUTPATIENT)
Dept: INTERNAL MEDICINE | Facility: CLINIC | Age: 58
End: 2018-04-18

## 2018-04-18 DIAGNOSIS — K94.23 PEG TUBE MALFUNCTION: Primary | ICD-10-CM

## 2018-04-18 NOTE — TELEPHONE ENCOUNTER
Malissa SORIA CristyDallas CRUMP Staff   Caller: Francheska/[facility] [phone] (Today,  3:07 PM)             Patient has a peg tube that was placed years ago at [facility]. Stated that it hasn't been used. It now started bleeding and swollen. Wants to know if someone can take it out since not being used.     Please call and advise.

## 2018-04-19 NOTE — TELEPHONE ENCOUNTER
Cecy/Denise, please call [facility]/Malissa and let her know that I would like to refer her to GI about the PEG problem. I'm not comfortable removing it at present. She will probably need a swallowing study 1st but I will discuss this with her and her family when I see her 5/3.  I've placed a referral to GI Clinic. Please call GI to schedule an ASAP Appt in GI. If pt's PEG situation worsens, she should go to the ER. Thanks

## 2018-04-19 NOTE — TELEPHONE ENCOUNTER
Spoke to Francheska with [facility] and informed and she wanted me to let you know she finally got her mattress.

## 2018-04-20 ENCOUNTER — TELEPHONE (OUTPATIENT)
Dept: INTERNAL MEDICINE | Facility: CLINIC | Age: 58
End: 2018-04-20

## 2018-04-24 ENCOUNTER — TELEPHONE (OUTPATIENT)
Dept: INTERNAL MEDICINE | Facility: CLINIC | Age: 58
End: 2018-04-24

## 2018-04-24 NOTE — TELEPHONE ENCOUNTER
Francheska would like a call back regarding the patient Blood Pressure 180/80. Francheska would like to know if the pt needs a stronger prescription

## 2018-04-24 NOTE — TELEPHONE ENCOUNTER
Cecy, please call Francheska and make sure pt is taking daily BP medications including: #1-Coreg 12.5mg BID, #2-Hydralazine 100mg TID, #3-Lasix 40mg daily, and #4-Nifedipine 90mg daily. For now I don't wish to change pt's BP medications, but  please ask Francheska to continue to check and document BPs so I can review these next week on 5/3  when I see pt. Thanks

## 2018-04-25 ENCOUNTER — OFFICE VISIT (OUTPATIENT)
Dept: GASTROENTEROLOGY | Facility: CLINIC | Age: 58
End: 2018-04-25
Payer: MEDICARE

## 2018-04-25 ENCOUNTER — TELEPHONE (OUTPATIENT)
Dept: INTERNAL MEDICINE | Facility: CLINIC | Age: 58
End: 2018-04-25

## 2018-04-25 VITALS — DIASTOLIC BLOOD PRESSURE: 82 MMHG | HEART RATE: 64 BPM | SYSTOLIC BLOOD PRESSURE: 170 MMHG

## 2018-04-25 DIAGNOSIS — Z43.1 PEG (PERCUTANEOUS ENDOSCOPIC GASTROSTOMY) ADJUSTMENT/REPLACEMENT/REMOVAL: Primary | ICD-10-CM

## 2018-04-25 PROCEDURE — 99214 OFFICE O/P EST MOD 30 MIN: CPT | Mod: S$GLB,,, | Performed by: NURSE PRACTITIONER

## 2018-04-25 PROCEDURE — 3079F DIAST BP 80-89 MM HG: CPT | Mod: CPTII,S$GLB,, | Performed by: NURSE PRACTITIONER

## 2018-04-25 PROCEDURE — 3077F SYST BP >= 140 MM HG: CPT | Mod: CPTII,S$GLB,, | Performed by: NURSE PRACTITIONER

## 2018-04-25 PROCEDURE — 99999 PR PBB SHADOW E&M-EST. PATIENT-LVL IV: CPT | Mod: PBBFAC,,, | Performed by: NURSE PRACTITIONER

## 2018-04-25 PROCEDURE — 99499 UNLISTED E&M SERVICE: CPT | Mod: S$PBB,,, | Performed by: NURSE PRACTITIONER

## 2018-04-25 RX ORDER — CARVEDILOL 12.5 MG/1
12.5 TABLET ORAL 2 TIMES DAILY WITH MEALS
Qty: 60 TABLET | Refills: 0
Start: 2018-04-25 | End: 2018-05-03 | Stop reason: SDUPTHER

## 2018-04-25 NOTE — LETTER
April 25, 2018      Beverly Muniz MD  [address]  [facility] [address]           [facility]  [address]  [facility] [address]  Phone: [phone]  Fax: [phone]          Patient: Lucy Perez   MR Number: [id number]   YOB: 1960   Date of Visit: 4/25/2018       Dear Dr. Beverly Muniz:    Thank you for referring Lucy Perez to me for evaluation. Attached you will find relevant portions of my assessment and plan of care.    If you have questions, please do not hesitate to call me. I look forward to following Lucy Perez along with you.    Sincerely,    Chiara Ventura, NP    Enclosure  CC:  No Recipients    If you would like to receive this communication electronically, please contact [email or web address] or [phone] to request more information on [employer] access.    For providers and/or their staff who would like to refer a patient to [facility], please contact us through our one-stop-shop provider referral line, [facility], at [phone].    If you feel you have received this communication in error or would no longer like to receive these types of communications, please e-mail [email or web address]

## 2018-04-25 NOTE — TELEPHONE ENCOUNTER
Malissa Cuellar Staff   Caller: Francheska/[facility] [phone] (Today, 10:02 AM)             Patient is having some bp issues and wanted to speak with you about that         Spoke to [facility] Francheska and states that pt's blood pressure was elevated today 180/80 and lastnight pt's  told Francheska she was having chest pains and she told him to bring her to the ER she called to check back on her and  states the chest pains stopped and her pressure was back to normal

## 2018-04-25 NOTE — PROGRESS NOTES
[facility] Note    Reason for Visit:  The encounter diagnosis was PEG (percutaneous endoscopic gastrostomy) adjustment/replacement/removal.    PCP:   Beverly Muniz   [address] / [place] [address]    Referring MD:  Beverly Muniz Md  [address] [place] [address]    HPI:  This is a 57 y.o. female here for PEG tube removal.  Has had PEG sine 8/2016 (per Meenakshi) s/p CVA with residual left sided weakness.   She currently reports she has not used the PEG in few months for feedings. It flushes well. She is swallowing well. She may choke on water on occasion, ie if she is drinking to much, too fast. She has not problems with swallowing meals. She stays hydrated. Her weight is stable.     Abdominal pain - no  Reflux - no  Dysphagia/odynophagia - no   Bowel habits - no problems  GI bleeding - denies hematochezia, hematemesis, melena, BRBPR, black/tarry stools, and coffee ground emesis  NSAID usage - asa 81 mg    PEG tube d/c:  gastrostomy feeding tube removed utilizing traction/countertraction method. Internal ruber bumper in tact.  Pressure applied for 3-5 minutes with good control of bleeding. Dry guaze dressing applied over PEG. Pt tolerated well. Site care instructions given. Understanding verbalized. ALMA Mulligan in exam room for assistance.    ROS:  Constitutional: No fevers, no chills, No unintentional weight loss, no fatigue,   ENT: No allergies  CV: No chest pain, no palpitations, no perif. edema, no sob on exertion  Pulm: No cough, No shortness of breath, no wheezes, no sputum  Ophtho: No vision changes  GI: see HPI; also no nausea, no vomiting, no change in appetite  Derm: No rash  Heme: No lymphadenopathy, No bruising  MSK: No arthritis, no muscle pain, + muscle weakness  : No dysuria, No hematuria  Endo: No hot or cold intolerance  Neuro: No syncope, No seizure,       Medical History:  has a past medical history of Anemia in stage 3 chronic kidney disease  (8/2/2017); Anemia of chronic disease (6/10/2017); Anxiety; Arthritis of right acromioclavicular joint (7/2/2014); Asthma; Bipolar disorder; Bronchitis, acute; Cataract; 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 (10/22/2016); Cortical cataract of both eyes (7/26/2016); Degeneration of lumbar or lumbosacral intervertebral disc (3/5/2013); Depression; General anesthetics causing adverse effect in therapeutic use; Hemorrhagic cerebrovascular accident (CVA); Hemorrhagic cerebrovascular accident (CVA) (1/3/2018); History of stroke (6/28/2017); 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); Hyperlipidemia; Hypertensive retinopathy of both eyes (7/26/2016); Impingement syndrome of right shoulder (7/2/2014); Left ventricular diastolic dysfunction with preserved systolic function (12/15/2015); Mixed anxiety and depressive disorder (3/5/2013); Obesity; THERESA (obstructive sleep apnea) (3/5/2013); Partial symptomatic epilepsy with complex partial seizures, not intractable, without status epilepticus; PEG (percutaneous endoscopic gastrostomy) status (6/28/2017); Renovascular hypertension (3/5/2013); Rheumatoid arthritis(714.0); Rotator cuff tear (7/2/2014); S/P breast biopsy, left (3/5/2013); S/P R shoulder surgery, SAD, DCE, biceps tenotomy (7/15/2014); S/P total hysterectomy (7/10/2013); Sarcoidosis; Stroke (2016); Type 2 diabetes mellitus with both eyes affected by moderate nonproliferative retinopathy without macular edema, without long-term current use of insulin (8/2/2017); Type 2 diabetes mellitus with diabetic polyneuropathy, without long-term current use of insulin (10/22/2015); Type 2 diabetes mellitus with stage 3 chronic kidney disease, without long-term current use of insulin (10/22/2015); and Vertebral artery stenosis (3/5/2013).    Surgical History:  has a past surgical history that includes Tubal ligation; Hysterectomy; Rotator cuff repair (Right, July 9, 2014); Breast surgery; stent placed; Colonoscopy (N/A,  "8/11/2016); and Skull surgery.    Family History: family history includes Bell's palsy in her sister; Blindness in her maternal grandmother; Cancer (age of onset: 55) in her mother; Diabetes in her mother; Heart attack in her mother; Heart disease in her mother; Hypertension in her mother and sister; Lupus in her sister; No Known Problems in her daughter and son; Sleep apnea in her sister; Stroke in her maternal aunt and sister..     Social History:  reports that she has never smoked. She has never used smokeless tobacco. She reports that she does not drink alcohol or use drugs.    Review of patient's allergies indicates:   Allergen Reactions    Lisinopril Other (See Comments)     Angioedema      Vicodin [hydrocodone-acetaminophen] Rash       Current Outpatient Prescriptions   Medication Sig    albuterol (PROVENTIL) 2.5 mg /3 mL (0.083 %) nebulizer solution Take 3 mLs (2.5 mg total) by nebulization every 6 (six) hours as needed for Wheezing. Rescue    aspirin (ECOTRIN) 81 MG EC tablet Take 81 mg by mouth once daily.      atorvastatin (LIPITOR) 40 MG tablet TAKE 1 TABLET ONE TIME DAILY FOR CHOLESTEROL    BD INSULIN PEN NEEDLE UF SHORT 31 gauge x 5/16" Ndle USE TO INJECT NOVOLOG FLEXPEN BEFORE MEALS    blood sugar diagnostic (ACCU-CHEK SMARTVIEW TEST STRIP) Strp 1 strip by Misc.(Non-Drug; Combo Route) route 2 (two) times daily.    carvedilol (COREG) 12.5 MG tablet Take 1 tablet (12.5 mg total) by mouth 2 (two) times daily with meals.    dantrolene (DANTRIUM) 25 MG Cap 3 caps 3x/day (Patient taking differently: 50 mg. 3 caps 3x/day)    ergocalciferol (ERGOCALCIFEROL) 50,000 unit Cap 1 capsule (50,000 Units total) by Per G Tube route every 7 days. (Patient taking differently: Take 50,000 Units by mouth every 7 days. )    famotidine (PEPCID) 20 MG tablet TAKE 1 TABLET (20 MG TOTAL) BY MOUTH ONCE DAILY.    ferrous sulfate 220 mg (44 mg iron)/5 mL solution 10ml daily for Iron/Give via PEG (Patient taking " differently: Take by mouth. 10ml daily for Iron/Give via PEG)    folic acid (FOLVITE) 1 MG tablet Take 1 tablet (1 mg total) by mouth once daily.    furosemide (LASIX) 40 MG tablet Take 1 tablet (40 mg total) by mouth once daily.    hydrALAZINE (APRESOLINE) 100 MG tablet Take 1 tablet (100 mg total) by mouth 3 (three) times daily.    levETIRAcetam (KEPPRA) 500 MG Tab Take 1 tablet (500 mg total) by mouth every 8 (eight) hours.    NIFEdipine (ADALAT CC) 90 MG TbSR Take 1 tablet (90 mg total) by mouth once daily.    pregabalin (LYRICA) 25 MG capsule 1 cap in AM and 2 caps at Bedtime for muscle spasm    sodium bicarbonate 650 MG tablet Take 2 tablets (1,300 mg total) by mouth 2 (two) times daily.     Current Facility-Administered Medications   Medication    onabotulinumtoxina injection 300 Units       Objective Findings:    Vital Signs:  BP (!) 170/82   Pulse 64   LMP  (LMP Unknown)   There is no height or weight on file to calculate BMI.    Physical Exam:  General Appearance: Well appearing in no acute distress  Head:   Normocephalic, without obvious abnormality  Eyes:    No scleral icterus, EOMI  ENT: Neck supple, Lips, mucosa, and tongue normal; teeth and gums normal  Lungs: CTA bilaterally in anterior and posterior fields, no wheezes, no crackles.  Heart:  Regular rate and rhythm, S1, S2 normal, no murmurs heard  Abdomen: Soft, non tender, non distended with positive bowel sounds in all four quadrants. No hepatosplenomegaly, ascites, or mass. PEG in LUQ with minimal granulation tissue.  Extremities: 2+ radial pulses, no clubbing, cyanosis or edema  Skin: No rash to exposed areas  Neurologic: A&Ox4      Labs:  Lab Results   Component Value Date    WBC 5.33 02/27/2018    HGB 8.9 (L) 02/27/2018    HCT 28.1 (L) 02/27/2018     02/27/2018    CHOL 162 02/22/2018    TRIG 48 02/22/2018    HDL 57 02/22/2018    ALT 12 02/27/2018    AST 13 02/27/2018     02/27/2018    K 5.3 (H) 02/27/2018     (H)  02/27/2018    CREATININE 2.2 (H) 02/27/2018    BUN 71 (H) 02/27/2018    CO2 21 (L) 02/27/2018    TSH 0.516 02/22/2018    INR 0.9 08/04/2017    GLUF 190 (H) 10/04/2004    HGBA1C 4.8 02/22/2018       Imaging:    Endoscopy:    EGD 12/2017  Colon 8/11/2017  Colon 10/2008  Assessment:  1. PEG (percutaneous endoscopic gastrostomy) adjustment/replacement/removal           Recommendations:  1. PEG- removed as per HPI. Pt advised she may need PEG again in the event of significant dysphagia or malnutrition. Understanding verbalized.    F/u PRN.      Order summary:         Thank you so much for allowing me to participate in the care of Lucy Aubrey Ventura, APRN, FNP-C

## 2018-04-25 NOTE — TELEPHONE ENCOUNTER
Spoke with Francheska /[facility] nurse ie pt's BPs running high just recently.  Pt seen in GI clinic and PEG pulled; BP there was high at 170/82.  BPs prior have been controlled and hesitate to increase coreg due to bradycardia.  I have recommended pt take an extra coreg 12.5mg tab if SBP>160 or DBP>90.  Pt's  will monitor/document BPs for review. If pt develops recurrent CP, she is to go to ER.

## 2018-05-03 ENCOUNTER — OFFICE VISIT (OUTPATIENT)
Dept: INTERNAL MEDICINE | Facility: CLINIC | Age: 58
End: 2018-05-03
Payer: MEDICARE

## 2018-05-03 ENCOUNTER — HOSPITAL ENCOUNTER (OUTPATIENT)
Dept: RADIOLOGY | Facility: HOSPITAL | Age: 58
Discharge: HOME | End: 2018-05-03
Attending: INTERNAL MEDICINE
Payer: MEDICARE

## 2018-05-03 ENCOUNTER — TELEPHONE (OUTPATIENT)
Dept: HEMATOLOGY/ONCOLOGY | Facility: CLINIC | Age: 58
End: 2018-05-03

## 2018-05-03 DIAGNOSIS — M62.838 MUSCLE SPASTICITY: ICD-10-CM

## 2018-05-03 DIAGNOSIS — J40 BRONCHITIS: ICD-10-CM

## 2018-05-03 DIAGNOSIS — N18.4 CONTROLLED TYPE 2 DIABETES MELLITUS WITH STAGE 4 CHRONIC KIDNEY DISEASE, WITHOUT LONG-TERM CURRENT USE OF INSULIN: ICD-10-CM

## 2018-05-03 DIAGNOSIS — N18.4 ANEMIA IN STAGE 4 CHRONIC KIDNEY DISEASE: ICD-10-CM

## 2018-05-03 DIAGNOSIS — I69.120 APHASIA FOLLOWING NONTRAUMATIC INTRACEREBRAL HEMORRHAGE: ICD-10-CM

## 2018-05-03 DIAGNOSIS — R07.9 CHEST PAIN, UNSPECIFIED TYPE: ICD-10-CM

## 2018-05-03 DIAGNOSIS — I61.9 HEMORRHAGIC CEREBROVASCULAR ACCIDENT (CVA): ICD-10-CM

## 2018-05-03 DIAGNOSIS — D63.1 ANEMIA IN STAGE 4 CHRONIC KIDNEY DISEASE: ICD-10-CM

## 2018-05-03 DIAGNOSIS — I10 ESSENTIAL HYPERTENSION: Primary | ICD-10-CM

## 2018-05-03 DIAGNOSIS — E11.22 CONTROLLED TYPE 2 DIABETES MELLITUS WITH STAGE 4 CHRONIC KIDNEY DISEASE, WITHOUT LONG-TERM CURRENT USE OF INSULIN: ICD-10-CM

## 2018-05-03 PROCEDURE — 94640 AIRWAY INHALATION TREATMENT: CPT | Mod: S$GLB,,, | Performed by: INTERNAL MEDICINE

## 2018-05-03 PROCEDURE — 3044F HG A1C LEVEL LT 7.0%: CPT | Mod: CPTII,S$GLB,, | Performed by: INTERNAL MEDICINE

## 2018-05-03 PROCEDURE — 99499 UNLISTED E&M SERVICE: CPT | Mod: S$PBB,,, | Performed by: INTERNAL MEDICINE

## 2018-05-03 PROCEDURE — 71045 X-RAY EXAM CHEST 1 VIEW: CPT | Mod: TC,FY,PO

## 2018-05-03 PROCEDURE — 99999 PR PBB SHADOW E&M-EST. PATIENT-LVL III: CPT | Mod: PBBFAC,,, | Performed by: INTERNAL MEDICINE

## 2018-05-03 PROCEDURE — 3077F SYST BP >= 140 MM HG: CPT | Mod: CPTII,S$GLB,, | Performed by: INTERNAL MEDICINE

## 2018-05-03 PROCEDURE — 93005 ELECTROCARDIOGRAM TRACING: CPT | Mod: S$GLB,,, | Performed by: INTERNAL MEDICINE

## 2018-05-03 PROCEDURE — 71045 X-RAY EXAM CHEST 1 VIEW: CPT | Mod: 26,,, | Performed by: RADIOLOGY

## 2018-05-03 PROCEDURE — 3078F DIAST BP <80 MM HG: CPT | Mod: CPTII,S$GLB,, | Performed by: INTERNAL MEDICINE

## 2018-05-03 PROCEDURE — 93010 ELECTROCARDIOGRAM REPORT: CPT | Mod: S$GLB,,, | Performed by: INTERNAL MEDICINE

## 2018-05-03 PROCEDURE — 99214 OFFICE O/P EST MOD 30 MIN: CPT | Mod: 25,S$GLB,, | Performed by: INTERNAL MEDICINE

## 2018-05-03 RX ORDER — METHYLPREDNISOLONE 4 MG/1
TABLET ORAL
Qty: 1 PACKAGE | Refills: 0 | Status: SHIPPED | OUTPATIENT
Start: 2018-05-03 | End: 2018-05-04 | Stop reason: SDUPTHER

## 2018-05-03 RX ORDER — CARVEDILOL 12.5 MG/1
TABLET ORAL
Qty: 90 TABLET | Refills: 3 | Status: SHIPPED | OUTPATIENT
Start: 2018-05-03 | End: 2018-07-17 | Stop reason: SDUPTHER

## 2018-05-03 RX ORDER — CARVEDILOL 12.5 MG/1
TABLET ORAL
Qty: 90 TABLET | Refills: 3 | Status: SHIPPED | OUTPATIENT
Start: 2018-05-03 | End: 2018-05-03 | Stop reason: SDUPTHER

## 2018-05-03 RX ORDER — DANTROLENE SODIUM 25 MG/1
CAPSULE ORAL
Qty: 180 CAPSULE | Refills: 0 | Status: SHIPPED | OUTPATIENT
Start: 2018-05-03 | End: 2018-05-24 | Stop reason: SDUPTHER

## 2018-05-03 RX ORDER — ALBUTEROL SULFATE 0.83 MG/ML
2.5 SOLUTION RESPIRATORY (INHALATION)
Status: COMPLETED | OUTPATIENT
Start: 2018-05-03 | End: 2018-05-03

## 2018-05-03 RX ORDER — AMOXICILLIN AND CLAVULANATE POTASSIUM 875; 125 MG/1; MG/1
1 TABLET, FILM COATED ORAL 2 TIMES DAILY
Qty: 20 TABLET | Refills: 0 | Status: SHIPPED | OUTPATIENT
Start: 2018-05-03 | End: 2018-05-04 | Stop reason: SDUPTHER

## 2018-05-03 RX ORDER — DANTROLENE SODIUM 25 MG/1
CAPSULE ORAL
Qty: 180 CAPSULE | Refills: 0
Start: 2018-05-03 | End: 2018-05-03 | Stop reason: SDUPTHER

## 2018-05-03 RX ADMIN — ALBUTEROL SULFATE 2.5 MG: 0.83 SOLUTION RESPIRATORY (INHALATION) at 01:05

## 2018-05-03 NOTE — PROGRESS NOTES
Verified patient name and date of birth.  Patient receiving 1 unit dose of Albuterol 2.5 mg/3 ml neb treatment.  After treatment 97% - 02

## 2018-05-03 NOTE — TELEPHONE ENCOUNTER
Spoke to daughter and scheduled appointment for her mother        ----- Message from Aura Klein MA sent at 5/3/2018  1:57 PM CDT -----  Contact: daughter-Reina [phone]  Hematology and Oncology  Symptomatic anemia +4   Dx     Requesting an appt w/Dr. Owen.  Pls call Daughter/Reina...# listed       Rhskof- [phone]

## 2018-05-04 RX ORDER — METHYLPREDNISOLONE 4 MG/1
TABLET ORAL
Qty: 1 PACKAGE | Refills: 0 | Status: SHIPPED | OUTPATIENT
Start: 2018-05-04 | End: 2018-05-25

## 2018-05-04 RX ORDER — AMOXICILLIN AND CLAVULANATE POTASSIUM 875; 125 MG/1; MG/1
1 TABLET, FILM COATED ORAL 2 TIMES DAILY
Qty: 20 TABLET | Refills: 0 | Status: SHIPPED | OUTPATIENT
Start: 2018-05-04 | End: 2018-05-11

## 2018-05-05 ENCOUNTER — TELEPHONE (OUTPATIENT)
Dept: INTERNAL MEDICINE | Facility: CLINIC | Age: 58
End: 2018-05-05

## 2018-05-05 VITALS — TEMPERATURE: 98 F | SYSTOLIC BLOOD PRESSURE: 140 MMHG | DIASTOLIC BLOOD PRESSURE: 68 MMHG

## 2018-05-05 DIAGNOSIS — R93.89 ABNORMAL X-RAY: ICD-10-CM

## 2018-05-05 DIAGNOSIS — Z12.39 BREAST CANCER SCREENING: Primary | ICD-10-CM

## 2018-05-05 NOTE — TELEPHONE ENCOUNTER
Hi Dr Owen,  I saw Lucy this past week for f/u DM/HTN and did a CXR to evaluate a bad cough/bronchitis. She has a lytic lesion of her left shoulder which could be the cause of her anemia.  I haven't spoken to her yet but wanted to ask what you recommended in terms of radiologic evaluation.  Would you recommend either a low dose CT or MRI or PET? I will call you Monday if no response through EPIC.  Thanks for any help, Beverly Muniz MD

## 2018-05-05 NOTE — PROGRESS NOTES
Ms. Perez is a 57-year-old female, known to myself, who presented to clinic   on May 3rd, at which time clinic billing was performed.  Her note is being   dictated today, May 5th.    CHIEF COMPLAINT:  Follow up diabetes, hypertension and complaints of cough.    HISTORY OF PRESENT ILLNESS:  Ms. Perez presents with daughter, Reina, for   followup of the above.  Ms. Perez is status post a major hemorrhagic CVA   August 2016 with resultant left-sided hemiparesis, who is presently wheelchair   bound.  She is trying to get stronger, working towards trying to walk with a   walker she states.  She recently had problems with her PEG tube, and as a result   of inflammation at the PEG tube site and the patient having been on oral   medications and food, the PEG tube was pulled.  Daughter, Reina, notes she has   been taking all medications by mouth and eating and drinking by mouth now for a   number of months, has not used the PEG tube, seems to be doing fine with the   PEG tube pulled.  She notes that at times she will choke on water, so Reina   has had to add thickener to the water, but otherwise, she eats very well without   any evidence of dysphagia or choking.  She does note she has had a cough   productive of yellow mucus for the last five days, is congested in the chest.    She does have some sharp left precordial chest pain, seems to be worse with   moving the right arm she states.  No documented fevers at home.  She is   following with Dr. Owen in Hematology/Oncology for anemia.  Blood pressures at   home seem to be somewhat erratic, occasionally running high with systolics as   high as 170.    PAST MEDICAL, SURGICAL, SOCIAL HISTORY:  Please see as thoroughly stated in EPIC   chart, which has been reviewed.    PHYSICAL EXAMINATION:  VITAL SIGNS:  With blood pressure 170/70, temperature 98.3, recheck blood   pressure per MD is 140/68.  GENERAL:  Lucy looks well, appears energetic.  Her color is  good.  She is alert   and responsive.  Her speech is much improved.  NECK:  Supple.  Negative for masses or thyromegaly.  LUNGS:  Show coarse breath sounds with congestion and wheezing throughout.  HEART:  Regular rate and rhythm without arrhythmias.  ABDOMEN:  Soft, nontender.  EXTREMITIES:  The patient continues with left-sided weakness.  There is no   anterior chest wall pain on palpation, but she does note that with range of   motion of the right shoulder, she has pain in the chest wall area.    WORKUP:  With lab work done 04/25/2018 showing CBC with H and H at 9.5 and 30.8,   which is stable to improved for the patient.  Cholesterol is 193 with .    Hemoglobin A1c is excellent at 5.8.  Comprehensive chemistry not performed on   this lab.    ASSESSMENT AND PLAN:  1.  Essential hypertension with blood pressure elevated.  A.  We will increase Coreg 12.5 mg to two tablets in the a.m. and continue with   one in the p.m.  B.  Continue on other antihypertensive medications, which include nifedipine CC   90 mg daily, hydralazine 100 mg t.i.d., Lasix 40 mg one a day.  C.  We will monitor pressures closely and return to clinic in three to four   months for followup.  2.  Bronchitis, rule out other.  A.  Ventolin neb treatment 2.5 mg in 3 mL normal saline in clinic and then at   home as needed, as the patient does have a Ventolin machine.  B.  Medrol Dosepak to be taken with food as directed.  C.  Augmentin 875 mg one p.o. b.i.d. x1 week.  D.  Chest x-ray.  E.  We will order swallowing study to ensure no evidence of aspiration or   difficulties with swallowing now that the PEG tube has been pulled.  3.  Diabetes mellitus type 2, non-insulin-dependent, controlled with diet.  A.  We will continue to follow closely.  4.  Anemia, felt to be multifactorial.  A.  Ensure appropriate followup with Hematology/Oncology, Dr. Owen, and we will   continue to monitor CBCs.  5.  Left-sided atypical chest pain.  A.  Chest  x-ray.  B.  EKG.  C.  Treatment of bronchitis as above.  6.  Status post hemorrhagic CVA with left-sided hemiparesis and aphasia, some   concern about competence of swallowing.  A.  We will order swallowing study to ensure no evidence of aspiration.  B.  We will schedule followup to Physical Medicine Rehab, Dr. Angeles.  7.  Health maintenance.  A.  Phone review after chest x-ray, EKG.  B.  Return to clinic in three to four months with fasting lab work one week   prior to this to include comprehensive chemistry, CBC, hemoglobin A1c, lipid   panel, go from there or see sooner if needed.            /karson [id number] sunshine(s)        FMS/HN  dd: 05/05/2018 13:10:07 (CDT)  td: 05/05/2018 22:42:31 (CDT)  Doc ID   #[id number]  Job ID #[id number]    CC:     This office note has been dictated.

## 2018-05-07 ENCOUNTER — TELEPHONE (OUTPATIENT)
Dept: INTERNAL MEDICINE | Facility: CLINIC | Age: 58
End: 2018-05-07

## 2018-05-07 DIAGNOSIS — R79.89 OTHER SPECIFIED ABNORMAL FINDINGS OF BLOOD CHEMISTRY: ICD-10-CM

## 2018-05-07 DIAGNOSIS — D64.9 ANEMIA, UNSPECIFIED TYPE: Primary | ICD-10-CM

## 2018-05-07 NOTE — TELEPHONE ENCOUNTER
Aura, please call Reina and let her know that the MRI is to look for a possible cancer or bone abnormality.  There's not 1 blood test for cancer. She is due for a Mammogram to check for breast cancer if she can get her mom in for this.  I've placed an order for [facility].  Thanks

## 2018-05-07 NOTE — TELEPHONE ENCOUNTER
[facility] called and Augmentin/Medrol debbie will be ready for  today. Reina notified.

## 2018-05-07 NOTE — TELEPHONE ENCOUNTER
Spoke with Reina ruiz her Mom's CXR(5/3)-showed no pneumonia but possible lytic lesion of left humerus. 'Will schedule MRI shoulder to evaluate.  Aura, please try to schedule pt for an MRI Left shoulder tomorrow after her Heme-Onc appt; transportation is difficult due to her hx CVA.  Thanks

## 2018-05-08 ENCOUNTER — HOSPITAL ENCOUNTER (OUTPATIENT)
Dept: RADIOLOGY | Facility: HOSPITAL | Age: 58
Discharge: HOME | End: 2018-05-08
Attending: INTERNAL MEDICINE
Payer: MEDICARE

## 2018-05-08 DIAGNOSIS — R93.89 ABNORMAL X-RAY: ICD-10-CM

## 2018-05-08 PROCEDURE — 73221 MRI JOINT UPR EXTREM W/O DYE: CPT | Mod: TC,LT

## 2018-05-08 PROCEDURE — 73221 MRI JOINT UPR EXTREM W/O DYE: CPT | Mod: 26,LT,, | Performed by: RADIOLOGY

## 2018-05-10 ENCOUNTER — TELEPHONE (OUTPATIENT)
Dept: INTERNAL MEDICINE | Facility: CLINIC | Age: 58
End: 2018-05-10

## 2018-05-10 NOTE — TELEPHONE ENCOUNTER
Spoke with Reina ruiz her Mom's MRI/Shoulder(5/8)-shows shoulder tendinosis but no bone lesions or lytic lesions. EKG(5/3)-NSR/no acute abnormalities. Lab is pending.

## 2018-05-24 ENCOUNTER — OFFICE VISIT (OUTPATIENT)
Dept: PHYSICAL MEDICINE AND REHAB | Facility: CLINIC | Age: 58
End: 2018-05-24
Payer: MEDICARE

## 2018-05-24 VITALS — WEIGHT: 170 LBS | HEIGHT: 63 IN | BODY MASS INDEX: 30.12 KG/M2

## 2018-05-24 DIAGNOSIS — M62.838 MUSCLE SPASTICITY: ICD-10-CM

## 2018-05-24 DIAGNOSIS — G81.14 LEFT SPASTIC HEMIPARESIS: Primary | ICD-10-CM

## 2018-05-24 PROCEDURE — 99999 PR PBB SHADOW E&M-EST. PATIENT-LVL III: CPT | Mod: PBBFAC,,, | Performed by: PHYSICAL MEDICINE & REHABILITATION

## 2018-05-24 PROCEDURE — 99213 OFFICE O/P EST LOW 20 MIN: CPT | Mod: S$GLB,,, | Performed by: PHYSICAL MEDICINE & REHABILITATION

## 2018-05-24 PROCEDURE — 99499 UNLISTED E&M SERVICE: CPT | Mod: S$GLB,,, | Performed by: PHYSICAL MEDICINE & REHABILITATION

## 2018-05-24 PROCEDURE — 3008F BODY MASS INDEX DOCD: CPT | Mod: CPTII,S$GLB,, | Performed by: PHYSICAL MEDICINE & REHABILITATION

## 2018-05-24 RX ORDER — DANTROLENE SODIUM 50 MG/1
CAPSULE ORAL
Qty: 90 CAPSULE | Refills: 6 | Status: SHIPPED | OUTPATIENT
Start: 2018-05-24 | End: 2018-08-07

## 2018-05-24 RX ORDER — TRAZODONE HYDROCHLORIDE 50 MG/1
50 TABLET ORAL NIGHTLY
Qty: 30 TABLET | Refills: 3 | Status: SHIPPED | OUTPATIENT
Start: 2018-05-24 | End: 2018-07-25 | Stop reason: SDUPTHER

## 2018-05-29 NOTE — PROGRESS NOTES
"Subjective:       Patient ID: Lucy Perez is a 57 y.o. female.    Chief Complaint: Follow-up    This very pleasant 58yo BF is followed for:    -- central pain syndrome.  She had a R SAH requiring emergent hemicraniex on 8/13/16.  She received a PEG but has since been placed on a diet.  She was tx at [facility] and went to a SNF for only 18 days where she did not progress much.   There are no records available from her hospitalization.  She is c/o left side pain as "stinging" and "electric shocks".  This occurs without movement but especially with PROM of the arm/leg.  Another MD increased gabapentin from 100mg TID to 300mg TID but this made her too sleepy and was changed to 300mg Q HS only.  I started carbamazepine 200mg BID at the last visit on 10/18/16 but her daughter reports today that it was no help and was stopped.      -- spasticity involving primarily the left hand.  It is difficult to extend the fingers of the left hand due to pain.  It is not clear if this is pain of spasticity or due to the central pain syndrome.  I started dantrolene 25mg TID and increased to 50mg TID but she was hospitalized with HTN which was attributed to dantrolene (?) and it was stopped before she could take the 50mg dose.  She recently had HH PT/OT restarted   She is able to answer my questions mostly appropriately but does appear to have some cognitive deficits.  Her family is very involved in her care.      --CC complaint today of "left arm/leg spasticity". Last visit Botox was performed. Patient found little improvement with injections. Patient does not wish to have injections again. Patient currently not taking dantrolene at this time. She complains of difficulty sleeping and depressed mood. Denies SI/HI. Ultimate goal is "to walk again". No other complaints today.                Review of Systems   Constitutional: Negative for fatigue.   Eyes: Negative for visual disturbance.   Respiratory: Negative for shortness of " breath.    Cardiovascular: Negative for chest pain and leg swelling.   Gastrointestinal: Negative for constipation.   Genitourinary: Negative for difficulty urinating, dysuria, frequency and urgency.   Musculoskeletal: Positive for gait problem. Negative for arthralgias, back pain, joint swelling, myalgias, neck pain and neck stiffness.   Neurological: Positive for speech difficulty, weakness and numbness. Negative for tremors.   Psychiatric/Behavioral: Negative for dysphoric mood and sleep disturbance. The patient is not nervous/anxious.        Objective:      Physical Exam   Constitutional: She is oriented to person, place, and time. She appears well-nourished.   Eyes: EOM are normal. Pupils are equal, round, and reactive to light.   Neck: Normal range of motion. Neck supple.   Cardiovascular: Normal rate.    Pulmonary/Chest: Effort normal.   Musculoskeletal:   RUE/RLE AROM WNL  LUE/LLE with no AROM   Neurological: She is oriented to person, place, and time.   RUE/RLE 5/5  LUE 0/5  LLE 1/5 HF, HE, 0/5 KF/KE/DF/PF   Skin: No rash noted.   Psychiatric: She has a normal mood and affect.       Assessment:       1. Left spastic hemiparesis -- Continue dantrolene to 50mg TID. She has a resting hand splint which she cannot wear due to the tone.  I explained that the objective is to relieve the spasticity enough that the pt can don the splint for extended periods which would likely help with her tone.      Plan:  --restart Dantrolene at 50mg TID. After 7 days, can increase to 100mg TID  --HH ordered for home PT  --Trazodone 50mg QHS to help with sleep and mood         Follow up: Will schedule for 2 month follow up to monitor progression          Plan:       As above

## 2018-06-05 ENCOUNTER — TELEPHONE (OUTPATIENT)
Dept: INTERNAL MEDICINE | Facility: CLINIC | Age: 58
End: 2018-06-05

## 2018-06-07 ENCOUNTER — TELEPHONE (OUTPATIENT)
Dept: INTERNAL MEDICINE | Facility: CLINIC | Age: 58
End: 2018-06-07

## 2018-06-07 NOTE — TELEPHONE ENCOUNTER
----- Message from Florencio Nickerson sent at 6/7/2018 10:21 AM CDT -----  Contact: [facility] Francheska [phone]  The patient's blood pressure is 180/94 and Francheska needs clarification on how the patient the her medicine.    Thank you

## 2018-06-18 DIAGNOSIS — I10 ESSENTIAL HYPERTENSION: ICD-10-CM

## 2018-06-18 RX ORDER — HYDRALAZINE HYDROCHLORIDE 100 MG/1
100 TABLET, FILM COATED ORAL 3 TIMES DAILY
Qty: 270 TABLET | Refills: 3 | Status: SHIPPED | OUTPATIENT
Start: 2018-06-18 | End: 2018-06-22 | Stop reason: SDUPTHER

## 2018-06-19 ENCOUNTER — TELEPHONE (OUTPATIENT)
Dept: ADMINISTRATIVE | Facility: CLINIC | Age: 58
End: 2018-06-19

## 2018-06-22 ENCOUNTER — HOSPITAL ENCOUNTER (EMERGENCY)
Facility: OTHER | Age: 58
Discharge: HOME | End: 2018-06-22
Attending: EMERGENCY MEDICINE
Payer: MEDICARE

## 2018-06-22 ENCOUNTER — TELEPHONE (OUTPATIENT)
Dept: INTERNAL MEDICINE | Facility: CLINIC | Age: 58
End: 2018-06-22

## 2018-06-22 VITALS
HEIGHT: 63 IN | RESPIRATION RATE: 12 BRPM | OXYGEN SATURATION: 96 % | WEIGHT: 160 LBS | DIASTOLIC BLOOD PRESSURE: 68 MMHG | TEMPERATURE: 98 F | BODY MASS INDEX: 28.35 KG/M2 | SYSTOLIC BLOOD PRESSURE: 159 MMHG | HEART RATE: 58 BPM

## 2018-06-22 DIAGNOSIS — D63.1 ANEMIA IN CHRONIC KIDNEY DISEASE, UNSPECIFIED CKD STAGE: ICD-10-CM

## 2018-06-22 DIAGNOSIS — R10.9 ABDOMINAL PAIN: ICD-10-CM

## 2018-06-22 DIAGNOSIS — I10 ESSENTIAL HYPERTENSION: ICD-10-CM

## 2018-06-22 DIAGNOSIS — N28.9 RENAL INSUFFICIENCY: ICD-10-CM

## 2018-06-22 DIAGNOSIS — E87.5 HYPERKALEMIA: ICD-10-CM

## 2018-06-22 DIAGNOSIS — K59.00 CONSTIPATION, UNSPECIFIED CONSTIPATION TYPE: Primary | ICD-10-CM

## 2018-06-22 DIAGNOSIS — N18.9 ANEMIA IN CHRONIC KIDNEY DISEASE, UNSPECIFIED CKD STAGE: ICD-10-CM

## 2018-06-22 LAB
ALBUMIN SERPL BCP-MCNC: 4 G/DL
ALP SERPL-CCNC: 59 U/L
ALT SERPL W/O P-5'-P-CCNC: 28 U/L
ANION GAP SERPL CALC-SCNC: 12 MMOL/L
AST SERPL-CCNC: 24 U/L
BACTERIA #/AREA URNS HPF: NORMAL /HPF
BASOPHILS # BLD AUTO: 0.07 K/UL
BASOPHILS NFR BLD: 1.4 %
BILIRUB SERPL-MCNC: 0.5 MG/DL
BILIRUB UR QL STRIP: NEGATIVE
BUN SERPL-MCNC: 84 MG/DL
CALCIUM SERPL-MCNC: 9.8 MG/DL
CHLORIDE SERPL-SCNC: 114 MMOL/L
CLARITY UR: ABNORMAL
CO2 SERPL-SCNC: 17 MMOL/L
COLOR UR: YELLOW
CREAT SERPL-MCNC: 2.7 MG/DL
DIFFERENTIAL METHOD: ABNORMAL
EOSINOPHIL # BLD AUTO: 0.2 K/UL
EOSINOPHIL NFR BLD: 4.2 %
ERYTHROCYTE [DISTWIDTH] IN BLOOD BY AUTOMATED COUNT: 14.4 %
EST. GFR  (AFRICAN AMERICAN): 21 ML/MIN/1.73 M^2
EST. GFR  (NON AFRICAN AMERICAN): 19 ML/MIN/1.73 M^2
GLUCOSE SERPL-MCNC: 118 MG/DL
GLUCOSE UR QL STRIP: NEGATIVE
HCT VFR BLD AUTO: 28.4 %
HGB BLD-MCNC: 9 G/DL
HGB UR QL STRIP: NEGATIVE
HYALINE CASTS #/AREA URNS LPF: 0 /LPF
KETONES UR QL STRIP: NEGATIVE
LEUKOCYTE ESTERASE UR QL STRIP: NEGATIVE
LIPASE SERPL-CCNC: 38 U/L
LYMPHOCYTES # BLD AUTO: 1.2 K/UL
LYMPHOCYTES NFR BLD: 24.5 %
MCH RBC QN AUTO: 31 PG
MCHC RBC AUTO-ENTMCNC: 31.7 G/DL
MCV RBC AUTO: 98 FL
MICROSCOPIC COMMENT: NORMAL
MONOCYTES # BLD AUTO: 0.3 K/UL
MONOCYTES NFR BLD: 6.5 %
NEUTROPHILS # BLD AUTO: 3.2 K/UL
NEUTROPHILS NFR BLD: 63.2 %
NITRITE UR QL STRIP: NEGATIVE
PH UR STRIP: 6 [PH] (ref 5–8)
PLATELET # BLD AUTO: 191 K/UL
PMV BLD AUTO: 10.5 FL
POTASSIUM SERPL-SCNC: 5.6 MMOL/L
PROT SERPL-MCNC: 8.1 G/DL
PROT UR QL STRIP: ABNORMAL
RBC # BLD AUTO: 2.9 M/UL
RBC #/AREA URNS HPF: 0 /HPF (ref 0–4)
SODIUM SERPL-SCNC: 143 MMOL/L
SP GR UR STRIP: 1.02 (ref 1–1.03)
SQUAMOUS #/AREA URNS HPF: 1 /HPF
URN SPEC COLLECT METH UR: ABNORMAL
UROBILINOGEN UR STRIP-ACNC: NEGATIVE EU/DL
WBC # BLD AUTO: 5.06 K/UL
WBC #/AREA URNS HPF: 0 /HPF (ref 0–5)

## 2018-06-22 PROCEDURE — 83690 ASSAY OF LIPASE: CPT

## 2018-06-22 PROCEDURE — 80053 COMPREHEN METABOLIC PANEL: CPT

## 2018-06-22 PROCEDURE — 96361 HYDRATE IV INFUSION ADD-ON: CPT

## 2018-06-22 PROCEDURE — 99284 EMERGENCY DEPT VISIT MOD MDM: CPT | Mod: 25

## 2018-06-22 PROCEDURE — P9612 CATHETERIZE FOR URINE SPEC: HCPCS

## 2018-06-22 PROCEDURE — 96374 THER/PROPH/DIAG INJ IV PUSH: CPT

## 2018-06-22 PROCEDURE — 93005 ELECTROCARDIOGRAM TRACING: CPT

## 2018-06-22 PROCEDURE — 81000 URINALYSIS NONAUTO W/SCOPE: CPT

## 2018-06-22 PROCEDURE — 93010 ELECTROCARDIOGRAM REPORT: CPT | Mod: ,,, | Performed by: INTERNAL MEDICINE

## 2018-06-22 PROCEDURE — 85025 COMPLETE CBC W/AUTO DIFF WBC: CPT

## 2018-06-22 PROCEDURE — 25000003 PHARM REV CODE 250: Performed by: EMERGENCY MEDICINE

## 2018-06-22 PROCEDURE — 63600175 PHARM REV CODE 636 W HCPCS: Performed by: EMERGENCY MEDICINE

## 2018-06-22 RX ORDER — IBUPROFEN 600 MG/1
600 TABLET ORAL
Status: COMPLETED | OUTPATIENT
Start: 2018-06-22 | End: 2018-06-22

## 2018-06-22 RX ORDER — HYDRALAZINE HYDROCHLORIDE 100 MG/1
100 TABLET, FILM COATED ORAL 3 TIMES DAILY
Qty: 90 TABLET | Refills: 0 | Status: SHIPPED | OUTPATIENT
Start: 2018-06-22 | End: 2018-07-23

## 2018-06-22 RX ORDER — FUROSEMIDE 10 MG/ML
40 INJECTION INTRAMUSCULAR; INTRAVENOUS
Status: COMPLETED | OUTPATIENT
Start: 2018-06-22 | End: 2018-06-22

## 2018-06-22 RX ADMIN — FUROSEMIDE 40 MG: 10 INJECTION, SOLUTION INTRAVENOUS at 05:06

## 2018-06-22 RX ADMIN — IBUPROFEN 600 MG: 600 TABLET ORAL at 04:06

## 2018-06-22 RX ADMIN — SODIUM CHLORIDE 500 ML: 9 INJECTION, SOLUTION INTRAVENOUS at 04:06

## 2018-06-22 NOTE — ED TRIAGE NOTES
"Pt presents to ED with c/o abdominal swelling. Pt's family member states the pt has not had a BM for "probably more than 4 days". Seen by home health and stated they were concerned by crackles in RLL of lungs. Family member at bedside states she has been breathing "a little bit harder than normal". Denies N/V, ab pain, melena, hematochezia, SOB, CP.   "

## 2018-06-22 NOTE — TELEPHONE ENCOUNTER
Spoke with OSWALDO Pritchard and she's stating that pt bp was elevated 1st 2 was checked manually 220/92(1st) 210/90(2nd) 3rd was checked using her machine 180/78 Heart rate 64 and  nurse states pt's stomach is swollen. And pt has also been out of hydralazine for a few days

## 2018-06-22 NOTE — ED NOTES
Received report from KARINA Gamboa.  XRay at bedside.     RN unable to obtain IV access. Another RN to attempt.

## 2018-06-22 NOTE — ED PROVIDER NOTES
"Encounter Date: 6/22/2018    SCRIBE #1 NOTE: I, Diogo Cole, am scribing for, and in the presence of, Dr. Morris .       History     Chief Complaint   Patient presents with    abdominal swelling     pt reports distended abdomen, denies abd pain, reports last BM x1.5 week ago, also c/o HTN, compliant with antihypertensives     Time seen by provider: 2:15 PM    This is a 57 y.o. female, with history of HLD, DM, CKD, and CVA, who presents with complaint of abdominal swelling that began this morning. Per daughter, home health nurse came today, examined her mother, and advised them to present to the ED for "high blood pressure, crackles in the right lower lobe, and abdominal swelling". Patient is currently endorsing left knee pain. She denies fevers, chills, headaches, dizziness, appetite change, sore throat, rhinorrhea, congestion, chest pain, abdominal pain, N/V/D, or urinary symptoms. Per daughter, patient has been experiencing SOB and urgency. Per daughter, her mother has not had a bowel movement in over a week. Per daughter, she normally complies with Miralax but she has not taken it in a few days.         The history is provided by a relative and the patient (daughter at bedside).     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    Cortical cataract of both eyes 7/26/2016    Degeneration of lumbar or lumbosacral intervertebral disc 3/5/2013    S/p MRI L-spine 5/2009     Depression     General anesthetics causing adverse effect in therapeutic " use     Hemorrhagic cerebrovascular accident (CVA)     [place] [facility] [place]    Hemorrhagic cerebrovascular accident (CVA) 1/3/2018    History of stroke 6/28/2017    s/p R-MCA stroke with R-putaminal hemorrhagic transformation in 8/2016 and 11/2016 ([place] [facility] with residual L hemiparesis, on AED s/p CVA      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    Hyperlipidemia     Hypertensive retinopathy of both eyes 7/26/2016    Impingement syndrome of right shoulder 7/2/2014    Left ventricular diastolic dysfunction with preserved systolic function 12/15/2015    Mixed anxiety and depressive disorder 3/5/2013    Obesity     THERESA (obstructive sleep apnea) 3/5/2013    No Home CPAP 2ndary to cost     Partial symptomatic epilepsy with complex partial seizures, not intractable, without status epilepticus     PEG (percutaneous endoscopic gastrostomy) status 6/28/2017    Renovascular hypertension 3/5/2013    Will resume home medications: amlodipine, labetalol, and hydralazine Will hold Lisinopril in setting of DARLING.   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 without macular edema, without long-term current use of insulin 8/2/2017    Type 2 diabetes mellitus with diabetic polyneuropathy, without long-term current use of insulin 10/22/2015    Type 2 diabetes mellitus with stage 3 chronic kidney disease, without long-term current use of insulin 10/22/2015    Vertebral artery stenosis 3/5/2013    S/p Stenting per Dr Burnett      Past Surgical History:   Procedure Laterality Date    BREAST SURGERY      breast reduction    COLONOSCOPY N/A 8/11/2016     "Procedure: COLONOSCOPY;  Surgeon: Jerry Vilchis MD;  Location: [facility] ([address] [place] [address]);  Service: Endoscopy;  Laterality: N/A;  Patient reports difficulty awaking from anesthesia in the past.    HYSTERECTOMY      ROTATOR CUFF REPAIR Right July 9, 2014    right side    Skull surgery      Aneurysm    stent placed      in vertebral artery    TUBAL LIGATION       Family History   Problem Relation Age of Onset    Cancer Mother 55        Breast cancer    Diabetes Mother     Hypertension Mother     Heart disease Mother     Heart attack Mother     Stroke Sister     Hypertension Sister     Sleep apnea Sister     No Known Problems Daughter     No Known Problems Son     Bell's palsy Sister     Lupus Sister     Blindness Maternal Grandmother     Diabetes Unknown         "My entire family family has diabetes"    Stroke Maternal Aunt     Amblyopia Neg Hx     Cataracts Neg Hx     Glaucoma Neg Hx     Macular degeneration Neg Hx     Retinal detachment Neg Hx     Strabismus Neg Hx      Social History   Substance Use Topics    Smoking status: Never Smoker    Smokeless tobacco: Never Used    Alcohol use No      Comment: occasional wine cooler      Review of Systems   Constitutional: Negative for activity change, appetite change, chills, diaphoresis and fever.   HENT: Negative for congestion, sore throat and trouble swallowing.    Eyes: Negative for photophobia and visual disturbance.   Respiratory: Positive for shortness of breath. Negative for cough and chest tightness.    Cardiovascular: Negative for chest pain.   Gastrointestinal: Positive for abdominal distention and constipation. Negative for abdominal pain, diarrhea, nausea and vomiting.   Endocrine: Negative for polydipsia and polyuria.   Genitourinary: Positive for urgency. Negative for difficulty urinating and flank pain.   Musculoskeletal: Negative for back pain and neck pain.        Positive for left knee pain.    Skin: Negative for rash. "   Neurological: Negative for weakness and headaches.   Psychiatric/Behavioral: Negative for confusion.       Physical Exam     Initial Vitals [06/22/18 1313]   BP Pulse Resp Temp SpO2   (!) 118/58 (!) 53 18 98 °F (36.7 °C) 100 %      MAP       --         Physical Exam    Nursing note and vitals reviewed.  Constitutional: She appears well-developed and well-nourished. She is cooperative.  Non-toxic appearance. No distress.   HENT:   Head: Normocephalic and atraumatic.   Mouth/Throat: Oropharynx is clear and moist.   Eyes: Conjunctivae and EOM are normal. Pupils are equal, round, and reactive to light.   Neck: Normal range of motion and full passive range of motion without pain. Neck supple. No thyromegaly present. No JVD present.   Cardiovascular: Regular rhythm, normal heart sounds and normal pulses. Bradycardia present.    Pulmonary/Chest: Effort normal and breath sounds normal. No respiratory distress.   Abdominal: Soft. Normal appearance. She exhibits no distension. There is no tenderness.   Obese abdomen. Well healed surgical scars.    Musculoskeletal: Normal range of motion. She exhibits no edema.   No lower extremity edema.    Neurological: She is alert and oriented to person, place, and time. She has normal strength. No cranial nerve deficit or sensory deficit.   Normal speech.    Skin: Skin is warm, dry and intact. No rash noted.   Psychiatric: She has a normal mood and affect. Her speech is normal and behavior is normal. Judgment and thought content normal.         ED Course   Procedures  Labs Reviewed   CBC W/ AUTO DIFFERENTIAL - Abnormal; Notable for the following:        Result Value    RBC 2.90 (*)     Hemoglobin 9.0 (*)     Hematocrit 28.4 (*)     MCHC 31.7 (*)     All other components within normal limits   COMPREHENSIVE METABOLIC PANEL - Abnormal; Notable for the following:     Potassium 5.6 (*)     Chloride 114 (*)     CO2 17 (*)     Glucose 118 (*)     BUN, Bld 84 (*)     Creatinine 2.7 (*)      eGFR if  21 (*)     eGFR if non  19 (*)     All other components within normal limits   URINALYSIS - Abnormal; Notable for the following:     Appearance, UA Hazy (*)     Protein, UA 2+ (*)     All other components within normal limits   LIPASE   URINALYSIS MICROSCOPIC     EKG Readings: (Independently Interpreted)   Previous EKG: Compared with most recent EKG   Sinus bradycardia, first-degree AV block, narrow complex QRS, no STEMI       Imaging Results          X-Ray Chest 1 View (Final result)  Result time 06/22/18 15:30:09    Final result by Waldo Pepe MD (06/22/18 15:30:09)                 Impression:      Cardiomegaly and central vascular congestion.      Electronically signed by: Waldo Pepe MD  Date:    06/22/2018  Time:    15:30             Narrative:    EXAMINATION:  XR CHEST 1 VIEW    CLINICAL HISTORY:  Unspecified abdominal pain    TECHNIQUE:  Single frontal view of the chest was performed.    COMPARISON:  None    FINDINGS:  Low lung volumes.  No focal airspace opacity or mass.  Central vascular congestion.  Cardiac silhouette is enlarged.  No acute osseous findings.                               X-Ray Abdomen Flat And Erect (Final result)  Result time 06/22/18 15:31:20    Final result by Waldo Pepe MD (06/22/18 15:31:20)                 Impression:      Constipation.      Electronically signed by: Waldo Pepe MD  Date:    06/22/2018  Time:    15:31             Narrative:    EXAMINATION:  XR ABDOMEN FLAT AND ERECT    CLINICAL HISTORY:  Unspecified abdominal pain    TECHNIQUE:  Flat and erect AP views of the abdomen were performed.    COMPARISON:  06/12/2017    FINDINGS:  Large amount of stool present throughout the colon.  No small bowel dilatation.  No free gas.  No acute osseous findings.                              X-Rays:   Independently Interpreted Readings:   Chest X-Ray: Cardiomegaly present. No focal infiltrate. No fluid overload.     Abdomen: No air fluid levels. No free air under diaphragm.      Medical Decision Making:   Initial Assessment:   57-year-old female with history of prior subarachnoid hemorrhage with subsequent decreased mobility, diastolic dysfunction, CKD- follows with [facility], and pulmonary hypertension.  brought in by daughter with concerns given findings of abdominal swelling and crackles to the right lung by home health aide today.  In addition daughter reports decreased bowel movements within the last week, the patient denies abdominal pain, normal diet, no vomiting.  On exam patient is well appearing, no abdominal distension, no masses, no shortness of breath, vital signs of bradycardia, without tachypnea, no hypoxia.  Doubt small bowel obstruction, acute CHF exacerbation, nor severe constipation at this time.  Will perform routine labs and reassess.  Clinical Tests:   Lab Tests: Ordered and Reviewed  Radiological Study: Ordered and Reviewed  ED Management:  Labs notable for stable anemia, no UTI, metabolic derangements consistent with CKD.  Mild hyperkalemia treated with Lasix, renal dysfunction with small fluid bolus.  Family made aware of these derangements, opting for outpatient management, with strict follow up PCP, [facility] at home and Nephrology Dr. Jermain HERRERA.             Scribe Attestation:   Scribe #1: I performed the above scribed service and the documentation accurately describes the services I performed. I attest to the accuracy of the note.    Attending Attestation:           Physician Attestation for Scribe:  Physician Attestation Statement for Scribe #1: I, Dr. Morris, reviewed documentation, as scribed by Diogo Cole  in my presence, and it is both accurate and complete.                    Clinical Impression:     1. Constipation, unspecified constipation type    2. Abdominal pain    3. Hyperkalemia    4. Renal insufficiency    5. Anemia in chronic kidney disease, unspecified CKD stage             Disposition:   Disposition: Discharged  Condition: Owen Morris MD  06/22/18 8266

## 2018-06-23 NOTE — TELEPHONE ENCOUNTER
Patient informed, verbalized understanding and had no questions.  He said we did not need to call Edith RN in cardiac rehab to give any orders because he doesn't have rehab anymore.   Pt has been recommended to go to ER with abdominal swelling/elevated BP.  I have erx'd Hydralazine into local pharmacy for BP control.  Denise, please call to make sure Ms Ayala is back on her hydralazine and is taking all of her BP meds.  Thanks

## 2018-06-25 ENCOUNTER — TELEPHONE (OUTPATIENT)
Dept: INTERNAL MEDICINE | Facility: CLINIC | Age: 58
End: 2018-06-25

## 2018-07-06 ENCOUNTER — TELEPHONE (OUTPATIENT)
Dept: INTERNAL MEDICINE | Facility: CLINIC | Age: 58
End: 2018-07-06

## 2018-07-06 NOTE — TELEPHONE ENCOUNTER
Called [facility] nurse, Francheska. Pt would like to go for inpatient rehab. Francheska is wondering where pt could go to be admitted for inpatient rehab, pt would like to go to the [facility] rehab on [address]. Pt would like to be walking again by her b-day. Please advise.

## 2018-07-06 NOTE — TELEPHONE ENCOUNTER
Pamela, please call  nurse/pt and let them know that I recommend she schedule a follow up appt with PM&R/Dr LING Pardo to further discuss this possibility of inpatient rehab.  Thanks

## 2018-07-06 NOTE — TELEPHONE ENCOUNTER
Called [facility] nurse[facility]ka and advised and gave name of doctor and phone number to their office.

## 2018-07-06 NOTE — TELEPHONE ENCOUNTER
----- Message from Akbar Live sent at 7/6/2018 11:01 AM CDT -----  Contact: [facility]   Type: Home Health (orders, updates, clarifications, etc.)    Home Health Agency/Nurse: Francheska     Phone number: [phone]    Reason for call: nurse is calling to speak with the office about outpatient rehab .     Comments: please call and advise, Thanks

## 2018-07-13 ENCOUNTER — TELEPHONE (OUTPATIENT)
Dept: INTERNAL MEDICINE | Facility: CLINIC | Age: 58
End: 2018-07-13

## 2018-07-13 DIAGNOSIS — D64.9 ANEMIA, UNSPECIFIED TYPE: Primary | ICD-10-CM

## 2018-07-13 NOTE — TELEPHONE ENCOUNTER
Florencio Cuellar Staff   Caller: [facility]/ Francheska [phone] (Today, 10:25 AM)             Telephone consult     The patient has been feel a little weak lately and her stool is dark. She wants to know if you think her CBC needs to be checked.     Thank you      Spoke to OHSTELLA Pritchard and she said if you place orders she can bring her next week

## 2018-07-14 NOTE — TELEPHONE ENCOUNTER
I agree:  Denise, please call Francheska with [facility] and ask her to do lab(CBC/BMP) on Mrs Ayala and fax to us. I've placed the orders.  Thanks

## 2018-07-16 ENCOUNTER — TELEPHONE (OUTPATIENT)
Dept: PHYSICAL MEDICINE AND REHAB | Facility: CLINIC | Age: 58
End: 2018-07-16

## 2018-07-16 NOTE — TELEPHONE ENCOUNTER
----- Message from Kailyn Mcghee MA sent at 7/13/2018 11:18 AM CDT -----  Contact: Francheska (Dr. Mancera)@ [phone]      ----- Message -----  From: Cami Mariscal  Sent: 7/13/2018  10:34 AM  To: Char Millan Staff    Calling to schedule inpatient rehab for the patient. Please call.

## 2018-07-17 DIAGNOSIS — I61.9 HEMORRHAGIC CEREBROVASCULAR ACCIDENT (CVA): ICD-10-CM

## 2018-07-18 RX ORDER — FAMOTIDINE 20 MG/1
20 TABLET, FILM COATED ORAL DAILY
Qty: 90 TABLET | Refills: 2 | Status: SHIPPED | OUTPATIENT
Start: 2018-07-18 | End: 2018-07-23

## 2018-07-18 RX ORDER — CARVEDILOL 12.5 MG/1
TABLET ORAL
Qty: 90 TABLET | Refills: 2 | Status: SHIPPED | OUTPATIENT
Start: 2018-07-18 | End: 2018-07-23

## 2018-07-19 ENCOUNTER — LAB VISIT (OUTPATIENT)
Dept: LAB | Facility: HOSPITAL | Age: 58
End: 2018-07-19
Attending: INTERNAL MEDICINE
Payer: MEDICARE

## 2018-07-19 DIAGNOSIS — I13.10 MALIGNANT HYPERTENSIVE HEART AND RENAL DISEASE, WITH RENAL FAILURE: Primary | ICD-10-CM

## 2018-07-19 LAB
ANION GAP SERPL CALC-SCNC: 12 MMOL/L
BASOPHILS # BLD AUTO: 0.05 K/UL
BASOPHILS NFR BLD: 1.1 %
BUN SERPL-MCNC: 84 MG/DL
CALCIUM SERPL-MCNC: 8.8 MG/DL
CHLORIDE SERPL-SCNC: 119 MMOL/L
CO2 SERPL-SCNC: 13 MMOL/L
CREAT SERPL-MCNC: 3.1 MG/DL
DIFFERENTIAL METHOD: ABNORMAL
EOSINOPHIL # BLD AUTO: 0.2 K/UL
EOSINOPHIL NFR BLD: 4.3 %
ERYTHROCYTE [DISTWIDTH] IN BLOOD BY AUTOMATED COUNT: 12.8 %
EST. GFR  (AFRICAN AMERICAN): 18.1 ML/MIN/1.73 M^2
EST. GFR  (NON AFRICAN AMERICAN): 15.7 ML/MIN/1.73 M^2
GLUCOSE SERPL-MCNC: 201 MG/DL
HCT VFR BLD AUTO: 28.8 %
HGB BLD-MCNC: 8.5 G/DL
IMM GRANULOCYTES # BLD AUTO: 0.03 K/UL
IMM GRANULOCYTES NFR BLD AUTO: 0.6 %
LYMPHOCYTES # BLD AUTO: 1 K/UL
LYMPHOCYTES NFR BLD: 21 %
MCH RBC QN AUTO: 30.6 PG
MCHC RBC AUTO-ENTMCNC: 29.5 G/DL
MCV RBC AUTO: 104 FL
MONOCYTES # BLD AUTO: 0.3 K/UL
MONOCYTES NFR BLD: 6.5 %
NEUTROPHILS # BLD AUTO: 3.1 K/UL
NEUTROPHILS NFR BLD: 66.5 %
NRBC BLD-RTO: 0 /100 WBC
PLATELET # BLD AUTO: 84 K/UL
PMV BLD AUTO: 12.1 FL
POTASSIUM SERPL-SCNC: 6.2 MMOL/L
RBC # BLD AUTO: 2.78 M/UL
SODIUM SERPL-SCNC: 144 MMOL/L
WBC # BLD AUTO: 4.62 K/UL

## 2018-07-19 PROCEDURE — 85025 COMPLETE CBC W/AUTO DIFF WBC: CPT

## 2018-07-19 PROCEDURE — 80048 BASIC METABOLIC PNL TOTAL CA: CPT

## 2018-07-20 ENCOUNTER — PES CALL (OUTPATIENT)
Dept: ADMINISTRATIVE | Facility: CLINIC | Age: 58
End: 2018-07-20

## 2018-07-23 ENCOUNTER — TELEPHONE (OUTPATIENT)
Dept: INTERNAL MEDICINE | Facility: CLINIC | Age: 58
End: 2018-07-23

## 2018-07-23 ENCOUNTER — HOSPITAL ENCOUNTER (EMERGENCY)
Facility: HOSPITAL | Age: 58
Discharge: HOME | End: 2018-07-23
Attending: EMERGENCY MEDICINE
Payer: MEDICARE

## 2018-07-23 VITALS
SYSTOLIC BLOOD PRESSURE: 204 MMHG | WEIGHT: 170 LBS | BODY MASS INDEX: 30.12 KG/M2 | DIASTOLIC BLOOD PRESSURE: 85 MMHG | OXYGEN SATURATION: 98 % | HEART RATE: 69 BPM | TEMPERATURE: 99 F | HEIGHT: 63 IN | RESPIRATION RATE: 20 BRPM

## 2018-07-23 DIAGNOSIS — E87.5 HYPERKALEMIA: Primary | ICD-10-CM

## 2018-07-23 DIAGNOSIS — E87.5 HYPERKALEMIA: ICD-10-CM

## 2018-07-23 DIAGNOSIS — E53.8 FOLATE DEFICIENCY: ICD-10-CM

## 2018-07-23 DIAGNOSIS — N18.4 CRI (CHRONIC RENAL INSUFFICIENCY), STAGE 4 (SEVERE): ICD-10-CM

## 2018-07-23 DIAGNOSIS — R53.1 WEAKNESS: Primary | ICD-10-CM

## 2018-07-23 DIAGNOSIS — D64.9 ANEMIA, UNSPECIFIED TYPE: ICD-10-CM

## 2018-07-23 DIAGNOSIS — I10 ESSENTIAL HYPERTENSION: ICD-10-CM

## 2018-07-23 DIAGNOSIS — I10 HYPERTENSION, UNSPECIFIED TYPE: ICD-10-CM

## 2018-07-23 DIAGNOSIS — E55.9 VITAMIN D DEFICIENCY: ICD-10-CM

## 2018-07-23 DIAGNOSIS — I61.9 HEMORRHAGIC CEREBROVASCULAR ACCIDENT (CVA): ICD-10-CM

## 2018-07-23 LAB
ABO + RH BLD: NORMAL
ALBUMIN SERPL BCP-MCNC: 3.5 G/DL
ALP SERPL-CCNC: 55 U/L
ALT SERPL W/O P-5'-P-CCNC: 12 U/L
ANION GAP SERPL CALC-SCNC: 11 MMOL/L
AST SERPL-CCNC: 13 U/L
BACTERIA #/AREA URNS AUTO: ABNORMAL /HPF
BASOPHILS # BLD AUTO: 0.03 K/UL
BASOPHILS NFR BLD: 0.7 %
BILIRUB SERPL-MCNC: 0.5 MG/DL
BILIRUB UR QL STRIP: NEGATIVE
BLD GP AB SCN CELLS X3 SERPL QL: NORMAL
BNP SERPL-MCNC: 140 PG/ML
BUN SERPL-MCNC: 79 MG/DL (ref 6–30)
BUN SERPL-MCNC: 81 MG/DL
CALCIUM SERPL-MCNC: 9.2 MG/DL
CHLORIDE SERPL-SCNC: 117 MMOL/L
CHLORIDE SERPL-SCNC: 118 MMOL/L (ref 95–110)
CLARITY UR REFRACT.AUTO: CLEAR
CO2 SERPL-SCNC: 16 MMOL/L
COLOR UR AUTO: ABNORMAL
CREAT SERPL-MCNC: 3 MG/DL
CREAT SERPL-MCNC: 3 MG/DL (ref 0.5–1.4)
DIFFERENTIAL METHOD: ABNORMAL
EOSINOPHIL # BLD AUTO: 0.2 K/UL
EOSINOPHIL NFR BLD: 5 %
ERYTHROCYTE [DISTWIDTH] IN BLOOD BY AUTOMATED COUNT: 12.5 %
EST. GFR  (AFRICAN AMERICAN): 18.9 ML/MIN/1.73 M^2
EST. GFR  (NON AFRICAN AMERICAN): 16.4 ML/MIN/1.73 M^2
GLUCOSE SERPL-MCNC: 101 MG/DL
GLUCOSE SERPL-MCNC: 98 MG/DL (ref 70–110)
GLUCOSE UR QL STRIP: NEGATIVE
HCT VFR BLD AUTO: 26 %
HCT VFR BLD CALC: 23 %PCV (ref 36–54)
HGB BLD-MCNC: 8.1 G/DL
HGB UR QL STRIP: NEGATIVE
HYALINE CASTS UR QL AUTO: 0 /LPF
IMM GRANULOCYTES # BLD AUTO: 0.01 K/UL
IMM GRANULOCYTES NFR BLD AUTO: 0.2 %
KETONES UR QL STRIP: NEGATIVE
LEUKOCYTE ESTERASE UR QL STRIP: NEGATIVE
LYMPHOCYTES # BLD AUTO: 1.3 K/UL
LYMPHOCYTES NFR BLD: 30.1 %
MAGNESIUM SERPL-MCNC: 2.2 MG/DL
MCH RBC QN AUTO: 31 PG
MCHC RBC AUTO-ENTMCNC: 31.2 G/DL
MCV RBC AUTO: 100 FL
MICROSCOPIC COMMENT: ABNORMAL
MONOCYTES # BLD AUTO: 0.4 K/UL
MONOCYTES NFR BLD: 9.5 %
NEUTROPHILS # BLD AUTO: 2.4 K/UL
NEUTROPHILS NFR BLD: 54.5 %
NITRITE UR QL STRIP: NEGATIVE
NRBC BLD-RTO: 0 /100 WBC
PH UR STRIP: 5 [PH] (ref 5–8)
PHOSPHATE SERPL-MCNC: 4.5 MG/DL
PLATELET # BLD AUTO: 179 K/UL
PMV BLD AUTO: 10.9 FL
POC IONIZED CALCIUM: 1.21 MMOL/L (ref 1.06–1.42)
POC TCO2 (MEASURED): 20 MMOL/L (ref 23–29)
POTASSIUM BLD-SCNC: 5.2 MMOL/L (ref 3.5–5.1)
POTASSIUM SERPL-SCNC: 5.1 MMOL/L
PROT SERPL-MCNC: 7.3 G/DL
PROT UR QL STRIP: ABNORMAL
RBC # BLD AUTO: 2.61 M/UL
RBC #/AREA URNS AUTO: 4 /HPF (ref 0–4)
SAMPLE: ABNORMAL
SODIUM BLD-SCNC: 146 MMOL/L (ref 136–145)
SODIUM SERPL-SCNC: 144 MMOL/L
SP GR UR STRIP: 1.01 (ref 1–1.03)
SQUAMOUS #/AREA URNS AUTO: 0 /HPF
TROPONIN I SERPL DL<=0.01 NG/ML-MCNC: 0.01 NG/ML
URN SPEC COLLECT METH UR: ABNORMAL
UROBILINOGEN UR STRIP-ACNC: NEGATIVE EU/DL
WBC # BLD AUTO: 4.42 K/UL
WBC #/AREA URNS AUTO: 1 /HPF (ref 0–5)
YEAST UR QL AUTO: ABNORMAL

## 2018-07-23 PROCEDURE — 84100 ASSAY OF PHOSPHORUS: CPT

## 2018-07-23 PROCEDURE — 83735 ASSAY OF MAGNESIUM: CPT

## 2018-07-23 PROCEDURE — 93010 ELECTROCARDIOGRAM REPORT: CPT | Mod: ,,, | Performed by: INTERNAL MEDICINE

## 2018-07-23 PROCEDURE — 84484 ASSAY OF TROPONIN QUANT: CPT

## 2018-07-23 PROCEDURE — P9612 CATHETERIZE FOR URINE SPEC: HCPCS

## 2018-07-23 PROCEDURE — 25000003 PHARM REV CODE 250: Performed by: EMERGENCY MEDICINE

## 2018-07-23 PROCEDURE — 86901 BLOOD TYPING SEROLOGIC RH(D): CPT

## 2018-07-23 PROCEDURE — 99284 EMERGENCY DEPT VISIT MOD MDM: CPT | Mod: 25

## 2018-07-23 PROCEDURE — 96374 THER/PROPH/DIAG INJ IV PUSH: CPT

## 2018-07-23 PROCEDURE — 83880 ASSAY OF NATRIURETIC PEPTIDE: CPT

## 2018-07-23 PROCEDURE — 81001 URINALYSIS AUTO W/SCOPE: CPT

## 2018-07-23 PROCEDURE — 80053 COMPREHEN METABOLIC PANEL: CPT

## 2018-07-23 PROCEDURE — 63600175 PHARM REV CODE 636 W HCPCS: Performed by: EMERGENCY MEDICINE

## 2018-07-23 PROCEDURE — 93005 ELECTROCARDIOGRAM TRACING: CPT

## 2018-07-23 PROCEDURE — 99285 EMERGENCY DEPT VISIT HI MDM: CPT | Mod: ,,, | Performed by: EMERGENCY MEDICINE

## 2018-07-23 PROCEDURE — 85025 COMPLETE CBC W/AUTO DIFF WBC: CPT

## 2018-07-23 RX ORDER — FUROSEMIDE 40 MG/1
40 TABLET ORAL
Status: DISCONTINUED | OUTPATIENT
Start: 2018-07-23 | End: 2018-07-23

## 2018-07-23 RX ORDER — FUROSEMIDE 40 MG/1
40 TABLET ORAL DAILY
Qty: 20 TABLET | Refills: 0 | Status: SHIPPED | OUTPATIENT
Start: 2018-07-23 | End: 2018-07-25 | Stop reason: SDUPTHER

## 2018-07-23 RX ORDER — FUROSEMIDE 10 MG/ML
40 INJECTION INTRAMUSCULAR; INTRAVENOUS
Status: COMPLETED | OUTPATIENT
Start: 2018-07-23 | End: 2018-07-23

## 2018-07-23 RX ORDER — NIFEDIPINE 90 MG/1
90 TABLET, FILM COATED, EXTENDED RELEASE ORAL DAILY
Qty: 20 TABLET | Refills: 0 | Status: SHIPPED | OUTPATIENT
Start: 2018-07-23 | End: 2018-07-25 | Stop reason: SDUPTHER

## 2018-07-23 RX ORDER — FAMOTIDINE 20 MG/1
20 TABLET, FILM COATED ORAL DAILY
Qty: 20 TABLET | Refills: 0 | Status: SHIPPED | OUTPATIENT
Start: 2018-07-23 | End: 2018-07-25 | Stop reason: SDUPTHER

## 2018-07-23 RX ORDER — HYDRALAZINE HYDROCHLORIDE 100 MG/1
100 TABLET, FILM COATED ORAL 3 TIMES DAILY
Qty: 45 TABLET | Refills: 0 | Status: SHIPPED | OUTPATIENT
Start: 2018-07-23 | End: 2018-07-25 | Stop reason: SDUPTHER

## 2018-07-23 RX ORDER — CARVEDILOL 12.5 MG/1
25 TABLET ORAL
Status: COMPLETED | OUTPATIENT
Start: 2018-07-23 | End: 2018-07-23

## 2018-07-23 RX ORDER — HYDRALAZINE HYDROCHLORIDE 25 MG/1
100 TABLET, FILM COATED ORAL
Status: COMPLETED | OUTPATIENT
Start: 2018-07-23 | End: 2018-07-23

## 2018-07-23 RX ORDER — CARVEDILOL 12.5 MG/1
TABLET ORAL
Qty: 40 TABLET | Refills: 0 | Status: SHIPPED | OUTPATIENT
Start: 2018-07-23 | End: 2018-07-25 | Stop reason: SDUPTHER

## 2018-07-23 RX ADMIN — HYDRALAZINE HYDROCHLORIDE 100 MG: 25 TABLET, FILM COATED ORAL at 07:07

## 2018-07-23 RX ADMIN — CARVEDILOL 25 MG: 12.5 TABLET, FILM COATED ORAL at 07:07

## 2018-07-23 RX ADMIN — FUROSEMIDE 40 MG: 10 INJECTION, SOLUTION INTRAMUSCULAR; INTRAVENOUS at 07:07

## 2018-07-23 NOTE — ED NOTES
Patient identifiers for Lucy Perez 57 y.o. female checked and correct.  Chief Complaint   Patient presents with    Hypertension     Pt presented to the ED via pov. Pt c/o HTN. Pt states she took her medication this morning. Pt alert. Pt states she was sent from the clinic for abnormal K+ as well.      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    Cortical cataract of both eyes 7/26/2016    Degeneration of lumbar or lumbosacral intervertebral disc 3/5/2013    S/p MRI L-spine 5/2009     Depression     General anesthetics causing adverse effect in therapeutic use     Hemorrhagic cerebrovascular accident (CVA)     8/2016 s/p [place]raniot[place] [facility] with Left hemiparesis    Hemorrhagic cerebrovascular accident (CVA) 1/3/2018    History of stroke 6/28/2017    s/p R-MCA stroke with R-putaminal hemorrhagic transformation in 8/2016 and 11/2016 (s/p [place]micraniotomy at [facility]) with residual L hemiparesis, on AED s/p CVA      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    Hyperlipidemia     Hypertensive retinopathy of both eyes 7/26/2016    Impingement syndrome of right shoulder 7/2/2014    Left ventricular diastolic dysfunction with preserved systolic function 12/15/2015    Mixed anxiety and depressive disorder 3/5/2013    Obesity     THERESA (obstructive sleep apnea) 3/5/2013    No Home CPAP 2ndary to cost     Partial symptomatic epilepsy with complex partial seizures, not intractable, without status epilepticus     PEG (percutaneous endoscopic gastrostomy) status 6/28/2017    Renovascular hypertension 3/5/2013    Will resume home medications: amlodipine, labetalol, and hydralazine Will hold Lisinopril in setting  of DARLING.   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 without macular edema, without long-term current use of insulin 8/2/2017    Type 2 diabetes mellitus with diabetic polyneuropathy, without long-term current use of insulin 10/22/2015    Type 2 diabetes mellitus with stage 3 chronic kidney disease, without long-term current use of insulin 10/22/2015    Vertebral artery stenosis 3/5/2013    S/p Stenting per Dr Burnett      Allergies reported:   Review of patient's allergies indicates:   Allergen Reactions    Lisinopril Other (See Comments)     Angioedema      Vicodin [hydrocodone-acetaminophen] Rash         LOC: Patient is awake, alert, and aware of environment with an appropriate affect. Patient is oriented x 3 and speaking appropriately.  APPEARANCE: Patient resting comfortably and in no acute distress. Patient is clean and well groomed, patient's clothing is properly fastened.  SKIN: The skin is warm and dry. Patient has normal skin turgor and moist mucus membranes. Skin is intact; no bruising or breakdown noted.  MUSKULOSKELETAL: Patient moves right lower and upper extremities, paralysis noted to left leg and arm, left hand contracted, +pulses.   RESPIRATORY: Airway is open and patent. Respirations are spontaneous and non-labored with normal effort and rate, BBS=clear  CARDIAC: Patient has a normal rate and rhythm. SR on cardiac monitor,No peripheral edema noted.   ABDOMEN: No distention noted. Bowel sounds active in all 4 quadrants. Soft and non-tender upon palpation.  NEUROLOGICAL:  pupils 2 mm  PERRL. Facial expression is symmetrical. Hand grasps are equal bilaterally.  +numbness to left upper and lower extremity (chronic).

## 2018-07-23 NOTE — ED PROVIDER NOTES
!Encounter Date: 7/23/2018    SCRIBE #1 NOTE: I, Pola Bryan, am scribing for, and in the presence of,  Dr. Delaney. I have scribed the entire note.       History     Chief Complaint   Patient presents with    Hypertension     Pt presented to the ED via pov. Pt c/o HTN. Pt states she took her medication this morning. Pt alert. Pt states she was sent from the clinic for abnormal K+ as well.      Time patient was seen by the provider: 6:48 PM      The patient is a 57 y.o. female with co-morbidities including: Asthma, Obesity, HTN, HLD, CKD, and DM as well as hx of CVA with chronic left-sided weakness who presents to the ED with a complaint of progressively worsening weakness and worsening HTN that began today. Pt admits to missing her BP medications last night but states she has taken her morning BP medications today. Pt recently had labs drawn 4 days ago that revealed elevated potassium and worsening kidney function. Her PCP recommended she present to the emergency department for evaluation by Nephrology and medical management of her hyperkalemia.  Again, these labs were obtained on Friday.  Associated sx include: SOB, fatigue, and decreased frequency of urination. Pt denies CP, cough, fever, chills, numbness, headache, and visual changes. Per spouse, pt has not been fully compliant with all her medications.  According to the daughter patient has been out of several antihypertensives over the past week and has been able to refill secondary to insurance issues.        The history is provided by the patient and medical records.     Review of patient's allergies indicates:   Allergen Reactions    Lisinopril Other (See Comments)     Angioedema      Vicodin [hydrocodone-acetaminophen] Rash     Past Medical History:   Diagnosis Date    Anemia in stage 3 chronic kidney disease 8/2/2017    Anemia of chronic disease 6/10/2017    Anxiety     Arthritis of right acromioclavicular joint 7/2/2014    Asthma     Bipolar  disorder     Bronchitis, acute     Cataract     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    Cortical cataract of both eyes 7/26/2016    Degeneration of lumbar or lumbosacral intervertebral disc 3/5/2013    S/p MRI L-spine 5/2009     Depression     General anesthetics causing adverse effect in therapeutic use     Hemorrhagic cerebrovascular accident (CVA)     8/2016 s/p Hemicraniotomy at [facility] [place] with Left hemiparesis    Hemorrhagic cerebrovascular accident (CVA) 1/3/2018    History of stroke 6/28/2017    s/p R-MCA stroke with R-putaminal hemorrhagic transformation in 8/2016 and 11/2016 (s/p hemicraniotomy at [facility] [place] [place]) with residual L hemiparesis, on AED s/p CVA      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    Hyperlipidemia     Hypertensive retinopathy of both eyes 7/26/2016    Impingement syndrome of right shoulder 7/2/2014    Left ventricular diastolic dysfunction with preserved systolic function 12/15/2015    Mixed anxiety and depressive disorder 3/5/2013    Obesity     THERESA (obstructive sleep apnea) 3/5/2013    No Home CPAP 2ndary to cost     Partial symptomatic epilepsy with complex partial seizures, not intractable, without status epilepticus     PEG (percutaneous endoscopic gastrostomy) status 6/28/2017    Renovascular hypertension 3/5/2013    Will resume home medications: amlodipine, labetalol, and hydralazine Will hold Lisinopril in setting of DARLING.   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 without macular edema, without long-term current use of insulin 8/2/2017    Type 2 diabetes mellitus with diabetic polyneuropathy, without long-term current  "use of insulin 10/22/2015    Type 2 diabetes mellitus with stage 3 chronic kidney disease, without long-term current use of insulin 10/22/2015    Vertebral artery stenosis 3/5/2013    S/p Stenting per Dr Burnett      Past Surgical History:   Procedure Laterality Date    BREAST SURGERY      breast reduction    COLONOSCOPY N/A 8/11/2016    Procedure: COLONOSCOPY;  Surgeon: Jerry Vilchis MD;  Location: [facility] ([address] [place] [address]);  Service: Endoscopy;  Laterality: N/A;  Patient reports difficulty awaking from anesthesia in the past.    HYSTERECTOMY      ROTATOR CUFF REPAIR Right July 9, 2014    right side    Skull surgery      Aneurysm    stent placed      in vertebral artery    TUBAL LIGATION       Family History   Problem Relation Age of Onset    Cancer Mother 55        Breast cancer    Diabetes Mother     Hypertension Mother     Heart disease Mother     Heart attack Mother     Stroke Sister     Hypertension Sister     Sleep apnea Sister     No Known Problems Daughter     No Known Problems Son     Bell's palsy Sister     Lupus Sister     Blindness Maternal Grandmother     Diabetes Unknown         "My entire family family has diabetes"    Stroke Maternal Aunt     Amblyopia Neg Hx     Cataracts Neg Hx     Glaucoma Neg Hx     Macular degeneration Neg Hx     Retinal detachment Neg Hx     Strabismus Neg Hx      Social History   Substance Use Topics    Smoking status: Never Smoker    Smokeless tobacco: Never Used    Alcohol use No      Comment: occasional wine cooler      Review of Systems   Constitutional: Positive for fatigue. Negative for chills and fever.   HENT: Negative for sore throat.    Eyes:        - visual changes   Respiratory: Positive for shortness of breath. Negative for cough.    Cardiovascular: Negative for chest pain.        + worsening HTN   Gastrointestinal: Negative for nausea.   Genitourinary: Negative for dysuria.        + decreased frequency of urination   Musculoskeletal: " Negative for back pain.   Skin: Negative for rash.   Neurological: Positive for weakness (worsening). Negative for numbness.   Hematological: Does not bruise/bleed easily.       Physical Exam     Initial Vitals [07/23/18 1746]   BP Pulse Resp Temp SpO2   (!) 215/93 64 17 99.4 °F (37.4 °C) 97 %      MAP       --         Physical Exam    Nursing note and vitals reviewed.  Constitutional: She is not diaphoretic. No distress.   Pt appears deconditioned but nontoxic.    HENT:   Head: Normocephalic and atraumatic.   Eyes: Conjunctivae and EOM are normal.   Neck: Normal range of motion. Neck supple. JVD present.   Cardiovascular: Normal rate, regular rhythm and intact distal pulses. Exam reveals no gallop and no friction rub.    Murmur (3/6 diastolic murmur) heard.  Pulmonary/Chest: Breath sounds normal. No respiratory distress. She has no wheezes. She has no rhonchi. She has no rales. She exhibits no tenderness.   Abdominal: Soft. Bowel sounds are normal. She exhibits no distension and no mass. There is no tenderness. There is no rebound and no guarding.   Musculoskeletal: Normal range of motion.   Lymphadenopathy:     She has no cervical adenopathy.   Neurological: She is alert and oriented to person, place, and time. No cranial nerve deficit or sensory deficit.   LLE and LUE weakness.    Skin: Skin is warm and dry. Capillary refill takes less than 2 seconds.   Psychiatric: She has a normal mood and affect.         ED Course   Procedures  Labs Reviewed   CBC W/ AUTO DIFFERENTIAL - Abnormal; Notable for the following:        Result Value    RBC 2.61 (*)     Hemoglobin 8.1 (*)     Hematocrit 26.0 (*)      (*)     MCHC 31.2 (*)     All other components within normal limits   URINALYSIS, REFLEX TO URINE CULTURE - Abnormal; Notable for the following:     Protein, UA 2+ (*)     All other components within normal limits    Narrative:     Preferred Collection Type->Urine, Clean Catch   COMPREHENSIVE METABOLIC PANEL -  Abnormal; Notable for the following:     Chloride 117 (*)     CO2 16 (*)     BUN, Bld 81 (*)     Creatinine 3.0 (*)     eGFR if  18.9 (*)     eGFR if non  16.4 (*)     All other components within normal limits   B-TYPE NATRIURETIC PEPTIDE - Abnormal; Notable for the following:      (*)     All other components within normal limits   URINALYSIS MICROSCOPIC - Abnormal; Notable for the following:     Yeast, UA Few (*)     All other components within normal limits    Narrative:     Preferred Collection Type->Urine, Clean Catch   ISTAT PROCEDURE - Abnormal; Notable for the following:     POC BUN 79 (*)     POC Creatinine 3.0 (*)     POC Sodium 146 (*)     POC Potassium 5.2 (*)     POC Chloride 118 (*)     POC TCO2 (MEASURED) 20 (*)     POC Hematocrit 23 (*)     All other components within normal limits   MAGNESIUM   PHOSPHORUS   TROPONIN I   TYPE & SCREEN     EKG Readings: (Independently Interpreted)   Sinus rhythm with 1st degree AV block at rate of 64 bpm. Normal axis. T-wave inversion in lead 3. Early repolarizations. Normal ST segments.        Imaging Results          X-Ray Chest AP Portable (Final result)  Result time 07/23/18 19:12:35    Final result by Misael Garcia MD (07/23/18 19:12:35)                 Impression:      Stable cardiomegaly with central vascular congestion.    No significant change from prior study.      Electronically signed by: Misael Garcia MD  Date:    07/23/2018  Time:    19:12             Narrative:    EXAMINATION:  XR CHEST AP PORTABLE    CLINICAL HISTORY:  Weakness    TECHNIQUE:  Single frontal view of the chest was performed.    COMPARISON:  June 22, 2018.    FINDINGS:  Stable cardiomegaly with central vascular congestion.    Heart and lungs  appear unchanged when allowing for differences in technique and positioning.                              X-Rays:   Independently Interpreted Readings:   Chest X-Ray: Show cardiomegaly. No pneumonia or  pneumothorax.      Medical Decision Making:   History:   Old Medical Records: I decided to obtain old medical records.  Old Records Summarized: records from clinic visits.       <> Summary of Records: According to chart review, pt's labs were obtained July 19. Potassium is 6.2. Creatinine of 3.1 that has worsened from 2.7. Hemoglobin is 8.5, which is down from 9.   Initial Assessment:   58 yo woman with hx of dCHF, pulmonary HTN, CKD, sarcoidosis, IDDM, and CVA with left-sided hemiparesis presents to the ED with progressively worsening weakness but now elevated potassium and worsening kidney functions on outpatient labs from Friday. She is also complaining of high BP. My differential diagnosis includes, but is not limited to: DARLING on CKD, electrolyte abnormality, arrhythmia, heart failure, nephrolithiasis, UTI, lab error, hyperkalemia, medication side effect.    Will obtain serum and urine labs. Of note, pt has an unclear compliance with home HTN regiment as scheduled. We will administer 20 mg of Coreg and 100 mg hydralazine PO as scheduled and due at this time. Will administer 40 mg IV lasix for elevated BP.    Reassessment:  Potassium today is 5.1, will not treat for hyperkalemia as it appears to be normal. Previous hyperkalemia possibly resolved versus lab error. Creatinine is 3.0 with BUN of 81. This appears to be stable from her creatinine and BUN from 1 month prior. No other electrolyte abnormalities. Troponin is negative and BNP is improved from baseline. Repeat /85. Clarified with pt's daughter that she is out of several of her BP medications. She has not been receiving them for the past week or so. This is likely the etiology of the pt's HTN. Given the fact that the pt's creatinine is essentially stable over the past month (GFR 21->19), I do not feel she requires an emergent Nephrology consult. Additionally, her hyperkalemia resolved prior to her evaluation today. The pt's uncontrolled HTN is likely  secondary to medication noncompliance. It improved with her home BP medications. Pt was provided with short-course of refills. She was instructed to have close follow-up with PCP for repeat labs and with Nephrology for kidney evaluation. Pt and  comfortable with this plan.   Independently Interpreted Test(s):   I have ordered and independently interpreted X-rays - see prior notes.  I have ordered and independently interpreted EKG Reading(s) - see prior notes  Clinical Tests:   Lab Tests: Ordered and Reviewed  Radiological Study: Ordered and Reviewed  Medical Tests: Ordered and Reviewed            Scribe Attestation:   Scribe #1: I performed the above scribed service and the documentation accurately describes the services I performed. I attest to the accuracy of the note.    Attending Attestation:           Physician Attestation for Scribe:      Comments: I, Dr. Giovani Delaney, personally performed the services described in this documentation. All medical record entries made by the scribe were at my direction and in my presence.  I have reviewed the chart and agree that the record reflects my personal performance and is accurate and complete. Giovani Delaney MD.  2:06 AM 07/24/2018                 Clinical Impression:   The primary encounter diagnosis was Weakness. Diagnoses of Hyperkalemia, Hypertension, unspecified type, Essential hypertension, Hemorrhagic cerebrovascular accident (CVA), and Folate deficiency were also pertinent to this visit.      Disposition:   Disposition: Discharged  Condition: Stable                        Giovani Delaney MD  07/24/18 0206

## 2018-07-23 NOTE — TELEPHONE ENCOUNTER
Lab(7/19)-showed K+ high at 6.2 with GFR down to 18/H&H stable. Pt last saw Nephrology/Dr Blank in 2/2016. I have spoken with pt's daughter, Reina and feel the hyperkalemia is too high to treat at home.  Pt needs stat K+ lowering and IVFs as well as a STAT Nephrology consult. I have recommended ER evaluation ASAP-she agrees.    I have spoken with triage nurse in ER and notified of pt's pending arrival.

## 2018-07-24 NOTE — TELEPHONE ENCOUNTER
Florencio SORIA CristyDallas [facility] Staff   Caller: Daughter/ Reina [phone] (Today, 11:17 AM)             Telephone consult     The patient's daughter said the E. R. Doctor wants to know if you want her continue taking the ergocalciferol (ERGOCALCIFEROL) 50,000 unit Cap.     Thank you

## 2018-07-25 ENCOUNTER — TELEPHONE (OUTPATIENT)
Dept: INTERNAL MEDICINE | Facility: CLINIC | Age: 58
End: 2018-07-25

## 2018-07-25 DIAGNOSIS — D64.9 ANEMIA, UNSPECIFIED TYPE: ICD-10-CM

## 2018-07-25 DIAGNOSIS — E53.8 FOLATE DEFICIENCY: ICD-10-CM

## 2018-07-25 DIAGNOSIS — N17.9 AKI (ACUTE KIDNEY INJURY): Primary | ICD-10-CM

## 2018-07-25 DIAGNOSIS — E78.00 PURE HYPERCHOLESTEROLEMIA: ICD-10-CM

## 2018-07-25 DIAGNOSIS — M62.838 MUSCLE SPASTICITY: ICD-10-CM

## 2018-07-25 DIAGNOSIS — I10 ESSENTIAL HYPERTENSION: ICD-10-CM

## 2018-07-25 DIAGNOSIS — I61.9 HEMORRHAGIC CEREBROVASCULAR ACCIDENT (CVA): ICD-10-CM

## 2018-07-25 RX ORDER — FUROSEMIDE 40 MG/1
40 TABLET ORAL DAILY
Qty: 90 TABLET | Refills: 2 | Status: SHIPPED | OUTPATIENT
Start: 2018-07-25 | End: 2019-07-23 | Stop reason: SDUPTHER

## 2018-07-25 RX ORDER — FOLIC ACID 1 MG/1
1 TABLET ORAL DAILY
Qty: 90 TABLET | Refills: 2 | Status: SHIPPED | OUTPATIENT
Start: 2018-07-25 | End: 2018-12-03 | Stop reason: SDUPTHER

## 2018-07-25 RX ORDER — TRAZODONE HYDROCHLORIDE 50 MG/1
50 TABLET ORAL NIGHTLY
Qty: 90 TABLET | Refills: 1 | Status: SHIPPED | OUTPATIENT
Start: 2018-07-25 | End: 2018-09-05

## 2018-07-25 RX ORDER — FERROUS SULFATE 220 (44)/5
SOLUTION, ORAL ORAL
Qty: 300 ML | Refills: 6 | Status: ON HOLD | OUTPATIENT
Start: 2018-07-25 | End: 2019-07-11 | Stop reason: HOSPADM

## 2018-07-25 RX ORDER — NIFEDIPINE 90 MG/1
90 TABLET, FILM COATED, EXTENDED RELEASE ORAL DAILY
Qty: 90 TABLET | Refills: 2 | Status: ON HOLD | OUTPATIENT
Start: 2018-07-25 | End: 2018-10-18 | Stop reason: HOSPADM

## 2018-07-25 RX ORDER — HYDRALAZINE HYDROCHLORIDE 100 MG/1
100 TABLET, FILM COATED ORAL 3 TIMES DAILY
Qty: 45 TABLET | Refills: 0 | Status: SHIPPED | OUTPATIENT
Start: 2018-07-25 | End: 2018-07-26 | Stop reason: SDUPTHER

## 2018-07-25 RX ORDER — PREGABALIN 25 MG/1
CAPSULE ORAL
Qty: 270 CAPSULE | Refills: 2 | Status: SHIPPED | OUTPATIENT
Start: 2018-07-25 | End: 2018-09-05 | Stop reason: SDUPTHER

## 2018-07-25 RX ORDER — ERGOCALCIFEROL 1.25 MG/1
50000 CAPSULE ORAL
Qty: 12 CAPSULE | Refills: 2 | Status: ON HOLD | OUTPATIENT
Start: 2018-07-25 | End: 2019-07-11 | Stop reason: SDUPTHER

## 2018-07-25 RX ORDER — SODIUM BICARBONATE 650 MG/1
1300 TABLET ORAL 2 TIMES DAILY
Qty: 360 TABLET | Refills: 2 | Status: ON HOLD | OUTPATIENT
Start: 2018-07-25 | End: 2019-07-11 | Stop reason: HOSPADM

## 2018-07-25 RX ORDER — CARVEDILOL 12.5 MG/1
TABLET ORAL
Qty: 270 TABLET | Refills: 2 | Status: ON HOLD | OUTPATIENT
Start: 2018-07-25 | End: 2018-08-13 | Stop reason: HOSPADM

## 2018-07-25 RX ORDER — FAMOTIDINE 20 MG/1
20 TABLET, FILM COATED ORAL DAILY
Qty: 90 TABLET | Refills: 2 | Status: SHIPPED | OUTPATIENT
Start: 2018-07-25 | End: 2018-12-03 | Stop reason: SDUPTHER

## 2018-07-25 RX ORDER — ATORVASTATIN CALCIUM 40 MG/1
TABLET, FILM COATED ORAL
Qty: 90 TABLET | Refills: 2 | Status: SHIPPED | OUTPATIENT
Start: 2018-07-25 | End: 2019-05-08 | Stop reason: SDUPTHER

## 2018-07-25 RX ORDER — LEVETIRACETAM 500 MG/1
500 TABLET ORAL EVERY 8 HOURS
Qty: 270 TABLET | Refills: 2 | Status: SHIPPED | OUTPATIENT
Start: 2018-07-25 | End: 2019-09-01 | Stop reason: SDUPTHER

## 2018-07-25 NOTE — TELEPHONE ENCOUNTER
90 day refills on all medications but dantrolene erx'd into pt's [facility] pharmacy.

## 2018-07-25 NOTE — TELEPHONE ENCOUNTER
"Denise, please call Francheska with [facility] and let her know that I placed "Subsequent" home health orders with [facility]. Pt needs lab on Thursday or Friday.  Thanks  "

## 2018-07-25 NOTE — TELEPHONE ENCOUNTER
I called Reina and let her know that her mom should continue the Vitamin D for now.  I will check it with next lab. I would like [facility] nurse to go out and do lab(BMP,H/H,Vitamin D) on Thursday or Friday this week and I've placed the lab orders especially since her mom sees Dr Blank/Nephrology on Monday 7/30/18.  Denise, Please call Francheska/[facility] nurse([phone]) to go out and do lab on Mrs Ayala Thursday or Friday. Thanks

## 2018-07-26 ENCOUNTER — LAB VISIT (OUTPATIENT)
Dept: LAB | Facility: HOSPITAL | Age: 58
End: 2018-07-26
Attending: INTERNAL MEDICINE
Payer: MEDICARE

## 2018-07-26 ENCOUNTER — OUTPATIENT CASE MANAGEMENT (OUTPATIENT)
Dept: ADMINISTRATIVE | Facility: OTHER | Age: 58
End: 2018-07-26

## 2018-07-26 DIAGNOSIS — I10 ESSENTIAL HYPERTENSION: ICD-10-CM

## 2018-07-26 DIAGNOSIS — N17.9 ACUTE KIDNEY FAILURE, UNSPECIFIED: Primary | ICD-10-CM

## 2018-07-26 PROCEDURE — 82306 VITAMIN D 25 HYDROXY: CPT

## 2018-07-26 PROCEDURE — 85025 COMPLETE CBC W/AUTO DIFF WBC: CPT

## 2018-07-26 PROCEDURE — 80069 RENAL FUNCTION PANEL: CPT

## 2018-07-26 RX ORDER — HYDRALAZINE HYDROCHLORIDE 100 MG/1
100 TABLET, FILM COATED ORAL 3 TIMES DAILY
Qty: 270 TABLET | Refills: 2 | Status: ON HOLD | OUTPATIENT
Start: 2018-07-26 | End: 2018-08-13 | Stop reason: HOSPADM

## 2018-07-26 RX ORDER — HYDRALAZINE HYDROCHLORIDE 100 MG/1
100 TABLET, FILM COATED ORAL 3 TIMES DAILY
Qty: 270 TABLET | Refills: 2 | Status: SHIPPED | OUTPATIENT
Start: 2018-07-26 | End: 2018-07-26 | Stop reason: SDUPTHER

## 2018-07-26 NOTE — PROGRESS NOTES
7- 1st attempt to complete initial assessment for Outpatient Care Management; left message requesting return call on mothers phone, though daughter reported pt does not usually answer the phone.  Daughter Reina, answered the alternate phone number given and stated it was her work number  - she was given my contact info but was unsure if she has POA for her mother.  Briefly explained the services available from [facility].   Daughter stated  That she would try to call back but would not be till next week. YULIET Carranza RN [facility]

## 2018-07-26 NOTE — PROGRESS NOTES
The following patient has been assigned to Conchita Carranza RN with Outpatient Complex Care Management for high risk screening.    Reason: High Risk Recently Discharged    Please contact [facility] at [phone] with any questions.    Thank you,    Anita Saab    Outpatient Case Management

## 2018-07-27 LAB
25(OH)D3+25(OH)D2 SERPL-MCNC: 17 NG/ML
ALBUMIN SERPL BCP-MCNC: 3.3 G/DL
ANION GAP SERPL CALC-SCNC: 11 MMOL/L
ANION GAP SERPL CALC-SCNC: 11 MMOL/L
BASOPHILS # BLD AUTO: 0.04 K/UL
BASOPHILS NFR BLD: 0.9 %
BUN SERPL-MCNC: 76 MG/DL
BUN SERPL-MCNC: 76 MG/DL
CALCIUM SERPL-MCNC: 8.7 MG/DL
CALCIUM SERPL-MCNC: 8.7 MG/DL
CHLORIDE SERPL-SCNC: 117 MMOL/L
CHLORIDE SERPL-SCNC: 117 MMOL/L
CO2 SERPL-SCNC: 15 MMOL/L
CO2 SERPL-SCNC: 15 MMOL/L
CREAT SERPL-MCNC: 2.9 MG/DL
CREAT SERPL-MCNC: 2.9 MG/DL
DIFFERENTIAL METHOD: ABNORMAL
EOSINOPHIL # BLD AUTO: 0.2 K/UL
EOSINOPHIL NFR BLD: 4.4 %
ERYTHROCYTE [DISTWIDTH] IN BLOOD BY AUTOMATED COUNT: 12.7 %
EST. GFR  (AFRICAN AMERICAN): 19.7 ML/MIN/1.73 M^2
EST. GFR  (AFRICAN AMERICAN): 19.7 ML/MIN/1.73 M^2
EST. GFR  (NON AFRICAN AMERICAN): 17.1 ML/MIN/1.73 M^2
EST. GFR  (NON AFRICAN AMERICAN): 17.1 ML/MIN/1.73 M^2
GLUCOSE SERPL-MCNC: 134 MG/DL
GLUCOSE SERPL-MCNC: 134 MG/DL
HCT VFR BLD AUTO: 25.3 %
HGB BLD-MCNC: 7.6 G/DL
IMM GRANULOCYTES # BLD AUTO: 0 K/UL
IMM GRANULOCYTES NFR BLD AUTO: 0 %
LYMPHOCYTES # BLD AUTO: 0.9 K/UL
LYMPHOCYTES NFR BLD: 21.4 %
MCH RBC QN AUTO: 31.1 PG
MCHC RBC AUTO-ENTMCNC: 30 G/DL
MCV RBC AUTO: 104 FL
MONOCYTES # BLD AUTO: 0.4 K/UL
MONOCYTES NFR BLD: 8.7 %
NEUTROPHILS # BLD AUTO: 2.8 K/UL
NEUTROPHILS NFR BLD: 64.6 %
NRBC BLD-RTO: 0 /100 WBC
PHOSPHATE SERPL-MCNC: 4.1 MG/DL
PLATELET # BLD AUTO: 149 K/UL
PMV BLD AUTO: 11.5 FL
POTASSIUM SERPL-SCNC: 5.3 MMOL/L
POTASSIUM SERPL-SCNC: 5.3 MMOL/L
RBC # BLD AUTO: 2.44 M/UL
SODIUM SERPL-SCNC: 143 MMOL/L
SODIUM SERPL-SCNC: 143 MMOL/L
WBC # BLD AUTO: 4.35 K/UL

## 2018-07-30 ENCOUNTER — OFFICE VISIT (OUTPATIENT)
Dept: NEPHROLOGY | Facility: CLINIC | Age: 58
End: 2018-07-30
Payer: MEDICARE

## 2018-07-30 VITALS
SYSTOLIC BLOOD PRESSURE: 140 MMHG | OXYGEN SATURATION: 96 % | HEIGHT: 63 IN | HEART RATE: 69 BPM | DIASTOLIC BLOOD PRESSURE: 80 MMHG

## 2018-07-30 DIAGNOSIS — N18.4 CKD (CHRONIC KIDNEY DISEASE), STAGE IV: Primary | ICD-10-CM

## 2018-07-30 PROCEDURE — 99999 PR PBB SHADOW E&M-EST. PATIENT-LVL IV: CPT | Mod: PBBFAC,,, | Performed by: INTERNAL MEDICINE

## 2018-07-30 PROCEDURE — 99499 UNLISTED E&M SERVICE: CPT | Mod: S$GLB,,, | Performed by: INTERNAL MEDICINE

## 2018-07-31 ENCOUNTER — OUTPATIENT CASE MANAGEMENT (OUTPATIENT)
Dept: ADMINISTRATIVE | Facility: OTHER | Age: 58
End: 2018-07-31

## 2018-07-31 NOTE — PROGRESS NOTES
7- 2nd Attempt to complete initial assessment for Outpatient Care Management; called number where I reached daughter last week - she said it was her work number however called the same number today and it was a VM - left message requesting return call. YULIET Carranza RN [facility]

## 2018-08-02 ENCOUNTER — TELEPHONE (OUTPATIENT)
Dept: INTERNAL MEDICINE | Facility: CLINIC | Age: 58
End: 2018-08-02

## 2018-08-02 NOTE — TELEPHONE ENCOUNTER
Spoke with Francheska/[facility]: Review of Lab (7/26)-showed H/H low at 7.6/25%, K+ at 5.3, GFR at 20.  Pt has no gross evidence of GI hemorrhage but hasn't been taking her iron tabs per Francheska. I've asked for a recheck H/H before weekend as pt sees Heme-Onc/Dr Owen next week 8/7.  DR Brayden ARMENTA  Thanks, Beverly Shay

## 2018-08-02 NOTE — TELEPHONE ENCOUNTER
----- Message from Ernestina Palma sent at 8/2/2018  9:26 AM CDT -----  Contact: Francheska/[facility]/[phone]  Nurse is calling to speak with someone in the office due to the pt's abnormal labs she had drawn. Please advise.      Thanks

## 2018-08-03 ENCOUNTER — LAB VISIT (OUTPATIENT)
Dept: LAB | Facility: HOSPITAL | Age: 58
End: 2018-08-03
Attending: INTERNAL MEDICINE
Payer: MEDICARE

## 2018-08-03 ENCOUNTER — OUTPATIENT CASE MANAGEMENT (OUTPATIENT)
Dept: ADMINISTRATIVE | Facility: OTHER | Age: 58
End: 2018-08-03

## 2018-08-03 ENCOUNTER — TELEPHONE (OUTPATIENT)
Dept: NEPHROLOGY | Facility: HOSPITAL | Age: 58
End: 2018-08-03

## 2018-08-03 DIAGNOSIS — D63.1 ERYTHROPOIETIN DEFICIENCY ANEMIA: Primary | ICD-10-CM

## 2018-08-03 DIAGNOSIS — I13.10 MALIGNANT HYPERTENSIVE HEART AND RENAL DISEASE, WITH RENAL FAILURE: ICD-10-CM

## 2018-08-03 LAB
ALBUMIN SERPL BCP-MCNC: 3.7 G/DL
ANION GAP SERPL CALC-SCNC: 13 MMOL/L
BUN SERPL-MCNC: 94 MG/DL
CALCIUM SERPL-MCNC: 9.5 MG/DL
CHLORIDE SERPL-SCNC: 114 MMOL/L
CO2 SERPL-SCNC: 14 MMOL/L
CREAT SERPL-MCNC: 3.4 MG/DL
ERYTHROCYTE [DISTWIDTH] IN BLOOD BY AUTOMATED COUNT: 12.8 %
EST. GFR  (AFRICAN AMERICAN): 16.2 ML/MIN/1.73 M^2
EST. GFR  (NON AFRICAN AMERICAN): 14.1 ML/MIN/1.73 M^2
GLUCOSE SERPL-MCNC: 124 MG/DL
HCT VFR BLD AUTO: 27.9 %
HGB BLD-MCNC: 8.5 G/DL
MCH RBC QN AUTO: 30.9 PG
MCHC RBC AUTO-ENTMCNC: 30.5 G/DL
MCV RBC AUTO: 102 FL
PHOSPHATE SERPL-MCNC: 5.1 MG/DL
PLATELET # BLD AUTO: 180 K/UL
PMV BLD AUTO: 11.2 FL
POTASSIUM SERPL-SCNC: 6.5 MMOL/L
RBC # BLD AUTO: 2.75 M/UL
SODIUM SERPL-SCNC: 141 MMOL/L
WBC # BLD AUTO: 4.57 K/UL

## 2018-08-03 PROCEDURE — 85027 COMPLETE CBC AUTOMATED: CPT

## 2018-08-03 PROCEDURE — 80069 RENAL FUNCTION PANEL: CPT

## 2018-08-03 NOTE — PROGRESS NOTES
"8-3-2018  Summary:    Contacted pt and daughter by phone - was placed on speaker phone by daughter (Reina) so that both she and pt could hear and complete assessment. Chart reviewed  Initial and RN assessment completed.  Pt. A&O x 4 - however due to hx of previous CVA -Reina stated her mom is slow to speak but does hear and understand.  Pt acknowledged that she and her  live together - Daughter stated  -Wilder - is the primary caregiver.  However Ruchi stated that she and her brother do most of the "household managing" i.e.meals and money and both are there almost every day before and after their work.   When reviewing the problem list and Kidney failure (stage 4) Reina reported that the physicians were monitoring it via labs and had changed pt to a renal diet due to a high potassium. Pt and Reina reported they had been given a copy of the diet as well as foods high and low potassium  - Reina requested another copy of food list as well as any educational info on the renal diet - will mail Yash. Daughter stated that they had a working B/P and do check pressure several times a week reports that the upper number stays in the 130 range.  Reina reported that since the CVA - her mother has L-sided hemiparesis  - Pt states that it does make her sad because she can no longer walk.  Reina reports that the advocacy program has been some help in putting a lift outside to get pt in the house since it is a raised house with 8 outside steps.  Reina stated the advocacy program helped "renovate" the bathroom - so the shower is accessible via a rolling shower chair  - additionally they put a bench in the shower with grab/pull bars on the L hand side - so patient cannot use bars.  Reina stated that due to not having a rolling shower chair they have to lift her mother in and out of the shower.  Reina stated they have a shaunna lift which they use to transfer her to W/C, and bedside commode, reported " "that the family had been looking into a sling with a "hole" in it for meeting elimination needs, however right now is cost prohibited     Daughter reports similar problem is posed when transporting the patient to and from appointment because they do not own a "handicapped" equipped vehicle - therefore - per Reina - pt needs to be lifted and placed in and out of the car.  Reina stated her dad has back issues and it is a problem. Daughter reports that at times they use the [employer] lift van which must be scheduled in advance and if its an urgent situation then they must use family vehicle.  During med rec pt stated she is no longer a diabetic as her A1C went "under the diabetic levels" so she does not take her insulin. Daughter stated the pt no long took the Dantrolene 50mg 3x day/7 then 100mg/day because a- they could not afford it and b- they did not think it was helping.   Ergoealciferol 1 cap/wk is P.O. Not per peg tube.tWill in basket message PCP.  Daughter reports financially they are struggling with past medical bills - [facility] and [facility] reporting that at the time of the CVA the pt was transported to the closest medical facility which was [facility] - will refer for both PAP and PFA   Regarding  Advanced care planning - Reina stated that her mother had told her she wanted her to have Medical POA - she cannot find paperwork - Checked for Reina  - Pt has Living will on file with [facility].    Interventions:  Refer to [facility] LCSW for: Advanced directive medical POA  Medical equipment needs - Shower chair? Sling for elimination?  Medical Bills/costs: PAP, PFA    Mail Yash - Kidney dx, diet, low sodium, low potassium, renal diet  In basket PCP regarding discrepancies    Plan:  Follow-up on receipt of education literature  Follow-up on [facility] LCSW referral  Assess learning needs based on questions and review of educational material  Assess for retention    Conchita Carranza RN  "

## 2018-08-03 NOTE — TELEPHONE ENCOUNTER
"Patient was called that she has a increased potassium and required to arrived urgently to ED for further evaluation. They stated that "she had a bowel movement today, an had previous arrival to ED her potassium returned back to normal". It was explained that we don't know how her levels are and having high levels of potassium is a life threatening condition that could lead to death. Which they don't want to arrive to ED.   "

## 2018-08-03 NOTE — TELEPHONE ENCOUNTER
----- Message from Haritha Carranza RN sent at 8/3/2018  4:10 PM CDT -----  Contact[facility]  Good Afternoon:    The above pt was enrolled in the Outpatient Complex Care management program today.  The med rec reveled the following discrepancies:    Novolog insulin pen - pt not taking stating that her levels were no longer diabetic                                    BS was 101 on 7-23 -18 and A1C was 5.8 on 5-3-18    [id number] 3x/day then 100mg/day - not taking - pt daughter stating they could not afford it and they did not think it was doing any good.    Ergoealciferol 1 cap 1x/wk is being taken po rather than peg tube    This is FYI as we cannot change the prescribed medications.  - If these are correct or not please adjust in the record.    Thank you so much!    Conchita BERNALN RN

## 2018-08-07 ENCOUNTER — OFFICE VISIT (OUTPATIENT)
Dept: HEMATOLOGY/ONCOLOGY | Facility: CLINIC | Age: 58
DRG: 674 | End: 2018-08-07
Payer: MEDICARE

## 2018-08-07 VITALS
TEMPERATURE: 99 F | OXYGEN SATURATION: 96 % | DIASTOLIC BLOOD PRESSURE: 77 MMHG | HEART RATE: 66 BPM | HEIGHT: 63 IN | SYSTOLIC BLOOD PRESSURE: 185 MMHG

## 2018-08-07 DIAGNOSIS — N18.4 ANEMIA IN STAGE 4 CHRONIC KIDNEY DISEASE: Primary | ICD-10-CM

## 2018-08-07 DIAGNOSIS — D63.1 ANEMIA IN STAGE 4 CHRONIC KIDNEY DISEASE: Primary | ICD-10-CM

## 2018-08-07 DIAGNOSIS — D86.9 SARCOIDOSIS: Chronic | ICD-10-CM

## 2018-08-07 DIAGNOSIS — R26.9 ABNORMALITY OF GAIT AND MOBILITY: ICD-10-CM

## 2018-08-07 PROCEDURE — 99213 OFFICE O/P EST LOW 20 MIN: CPT | Mod: S$GLB,,, | Performed by: INTERNAL MEDICINE

## 2018-08-07 PROCEDURE — 3078F DIAST BP <80 MM HG: CPT | Mod: CPTII,S$GLB,, | Performed by: INTERNAL MEDICINE

## 2018-08-07 PROCEDURE — 99499 UNLISTED E&M SERVICE: CPT | Mod: HCNC,S$GLB,, | Performed by: INTERNAL MEDICINE

## 2018-08-07 PROCEDURE — 3077F SYST BP >= 140 MM HG: CPT | Mod: CPTII,S$GLB,, | Performed by: INTERNAL MEDICINE

## 2018-08-07 PROCEDURE — 99999 PR PBB SHADOW E&M-EST. PATIENT-LVL III: CPT | Mod: PBBFAC,,, | Performed by: INTERNAL MEDICINE

## 2018-08-07 NOTE — PROGRESS NOTES
Lucy Perez is here today for evaluation of CKD IV. Last seen in renal office 2/3/16 At that time Baseline Cr 1.4-1.7 mg/dl Her most recent lab listed below She is hypertensive; diabetic with nephropathy and s/p CVA. Today she appears to be alert but subdued; no new complaints      Last relevant lab results shown below:  Lab Results   Component Value Date    K 5.8 (H) 08/07/2018    CREATININE 3.4 (H) 08/07/2018    ESTGFRAFRICA 16.2 (A) 08/07/2018    EGFRNONAA 14.1 (A) 08/07/2018       Physical Exam   Chronically ill woman; no acute distress; oriented x 3  /80    HEENT Grossly Intact  CHEST; Clear P&A; no rales or rhonchi  HEART; RR; S1&S2 no murmur rub gallops  ABD; BS (+) non-tender; (-)CVAT  EXT; (++) Edema      Impression:  1. CKD (chronic kidney disease), stage IV      2; Hypertension Borderline control        Plan:  Dicussed with family and patient; she is near ESRD. Patient understands but is clearly reluctant   Discussed with Dr LING Haynes; who is her PCP; She agrees patient benefit from dialysis if willing;

## 2018-08-07 NOTE — Clinical Note
Please seek authorization for darbepoetin plan. Once approved, please schedule first injection.  Return to clinic 4 weeks after first injection with CBC, RFP, type and screen and appt for 2nd darbepoetin injection.

## 2018-08-08 ENCOUNTER — OUTPATIENT CASE MANAGEMENT (OUTPATIENT)
Dept: ADMINISTRATIVE | Facility: OTHER | Age: 58
End: 2018-08-08

## 2018-08-08 NOTE — PROGRESS NOTES
Summary:  1st Attempt to complete Social Work Assessment for Outpatient Care Management; left message requesting a return call.  LCSW will reattempt at a later date.      Intervention:  None    Plan for next encounter:  2nd attempt on 8/10

## 2018-08-09 ENCOUNTER — TELEPHONE (OUTPATIENT)
Dept: INTERNAL MEDICINE | Facility: CLINIC | Age: 58
End: 2018-08-09

## 2018-08-09 ENCOUNTER — HOSPITAL ENCOUNTER (INPATIENT)
Facility: HOSPITAL | Age: 58
LOS: 8 days | Discharge: HOME HEALTH | DRG: 674 | End: 2018-08-17
Attending: EMERGENCY MEDICINE | Admitting: EMERGENCY MEDICINE
Payer: MEDICARE

## 2018-08-09 DIAGNOSIS — D63.1 ANEMIA IN STAGE 4 CHRONIC KIDNEY DISEASE: ICD-10-CM

## 2018-08-09 DIAGNOSIS — R53.83 FATIGUE, UNSPECIFIED TYPE: ICD-10-CM

## 2018-08-09 DIAGNOSIS — N17.9 ACUTE RENAL FAILURE, UNSPECIFIED ACUTE RENAL FAILURE TYPE: ICD-10-CM

## 2018-08-09 DIAGNOSIS — N17.9 ACUTE KIDNEY FAILURE: ICD-10-CM

## 2018-08-09 DIAGNOSIS — R40.0 SOMNOLENCE: ICD-10-CM

## 2018-08-09 DIAGNOSIS — N18.4 ANEMIA IN STAGE 4 CHRONIC KIDNEY DISEASE: ICD-10-CM

## 2018-08-09 DIAGNOSIS — I10 HYPERTENSION, UNSPECIFIED TYPE: ICD-10-CM

## 2018-08-09 DIAGNOSIS — N19 UREMIA: Primary | ICD-10-CM

## 2018-08-09 DIAGNOSIS — R53.83 FATIGUE: ICD-10-CM

## 2018-08-09 DIAGNOSIS — E87.5 HYPERKALEMIA: ICD-10-CM

## 2018-08-09 DIAGNOSIS — R41.82 ALTERED MENTAL STATUS: ICD-10-CM

## 2018-08-09 DIAGNOSIS — N18.4 STAGE 4 CHRONIC KIDNEY DISEASE: ICD-10-CM

## 2018-08-09 DIAGNOSIS — I10 ESSENTIAL HYPERTENSION: ICD-10-CM

## 2018-08-09 LAB
ALBUMIN SERPL BCP-MCNC: 3.9 G/DL
ALP SERPL-CCNC: 64 U/L
ALT SERPL W/O P-5'-P-CCNC: 9 U/L
ANION GAP SERPL CALC-SCNC: 12 MMOL/L
AST SERPL-CCNC: 15 U/L
BASOPHILS # BLD AUTO: 0.07 K/UL
BASOPHILS NFR BLD: 0.8 %
BILIRUB SERPL-MCNC: 0.5 MG/DL
BNP SERPL-MCNC: 111 PG/ML
BUN SERPL-MCNC: 96 MG/DL
CALCIUM SERPL-MCNC: 9.6 MG/DL
CHLORIDE SERPL-SCNC: 111 MMOL/L
CO2 SERPL-SCNC: 17 MMOL/L
CREAT SERPL-MCNC: 3.6 MG/DL
DIFFERENTIAL METHOD: ABNORMAL
EOSINOPHIL # BLD AUTO: 0.2 K/UL
EOSINOPHIL NFR BLD: 1.8 %
ERYTHROCYTE [DISTWIDTH] IN BLOOD BY AUTOMATED COUNT: 13.1 %
EST. GFR  (AFRICAN AMERICAN): 15.1 ML/MIN/1.73 M^2
EST. GFR  (NON AFRICAN AMERICAN): 13.1 ML/MIN/1.73 M^2
GLUCOSE SERPL-MCNC: 191 MG/DL
HCT VFR BLD AUTO: 31.9 %
HGB BLD-MCNC: 9.9 G/DL
IMM GRANULOCYTES # BLD AUTO: 0.03 K/UL
IMM GRANULOCYTES NFR BLD AUTO: 0.4 %
LACTATE SERPL-SCNC: 0.7 MMOL/L
LYMPHOCYTES # BLD AUTO: 0.7 K/UL
LYMPHOCYTES NFR BLD: 7.6 %
MCH RBC QN AUTO: 30.6 PG
MCHC RBC AUTO-ENTMCNC: 31 G/DL
MCV RBC AUTO: 99 FL
MONOCYTES # BLD AUTO: 0.3 K/UL
MONOCYTES NFR BLD: 3.5 %
NEUTROPHILS # BLD AUTO: 7.3 K/UL
NEUTROPHILS NFR BLD: 85.9 %
NRBC BLD-RTO: 0 /100 WBC
PLATELET # BLD AUTO: 217 K/UL
PMV BLD AUTO: 11.8 FL
POTASSIUM SERPL-SCNC: 5.8 MMOL/L
PROT SERPL-MCNC: 7.9 G/DL
RBC # BLD AUTO: 3.24 M/UL
SODIUM SERPL-SCNC: 140 MMOL/L
WBC # BLD AUTO: 8.54 K/UL

## 2018-08-09 PROCEDURE — 99285 EMERGENCY DEPT VISIT HI MDM: CPT | Mod: 25,,, | Performed by: EMERGENCY MEDICINE

## 2018-08-09 PROCEDURE — 93010 ELECTROCARDIOGRAM REPORT: CPT | Mod: ,,, | Performed by: INTERNAL MEDICINE

## 2018-08-09 PROCEDURE — 99285 EMERGENCY DEPT VISIT HI MDM: CPT | Mod: 25

## 2018-08-09 PROCEDURE — 83605 ASSAY OF LACTIC ACID: CPT

## 2018-08-09 PROCEDURE — 96375 TX/PRO/DX INJ NEW DRUG ADDON: CPT

## 2018-08-09 PROCEDURE — 36410 VNPNXR 3YR/> PHY/QHP DX/THER: CPT | Mod: ,,, | Performed by: EMERGENCY MEDICINE

## 2018-08-09 PROCEDURE — 80053 COMPREHEN METABOLIC PANEL: CPT

## 2018-08-09 PROCEDURE — 96365 THER/PROPH/DIAG IV INF INIT: CPT

## 2018-08-09 PROCEDURE — 63600175 PHARM REV CODE 636 W HCPCS: Performed by: EMERGENCY MEDICINE

## 2018-08-09 PROCEDURE — 82962 GLUCOSE BLOOD TEST: CPT

## 2018-08-09 PROCEDURE — 85025 COMPLETE CBC W/AUTO DIFF WBC: CPT

## 2018-08-09 PROCEDURE — 83880 ASSAY OF NATRIURETIC PEPTIDE: CPT

## 2018-08-09 PROCEDURE — 36410 VNPNXR 3YR/> PHY/QHP DX/THER: CPT

## 2018-08-09 PROCEDURE — 63600175 PHARM REV CODE 636 W HCPCS: Performed by: STUDENT IN AN ORGANIZED HEALTH CARE EDUCATION/TRAINING PROGRAM

## 2018-08-09 PROCEDURE — 12000002 HC ACUTE/MED SURGE SEMI-PRIVATE ROOM

## 2018-08-09 PROCEDURE — 96376 TX/PRO/DX INJ SAME DRUG ADON: CPT

## 2018-08-09 PROCEDURE — G0378 HOSPITAL OBSERVATION PER HR: HCPCS

## 2018-08-09 RX ORDER — HYDRALAZINE HYDROCHLORIDE 20 MG/ML
20 INJECTION INTRAMUSCULAR; INTRAVENOUS
Status: COMPLETED | OUTPATIENT
Start: 2018-08-09 | End: 2018-08-09

## 2018-08-09 RX ORDER — HYDRALAZINE HYDROCHLORIDE 20 MG/ML
10 INJECTION INTRAMUSCULAR; INTRAVENOUS
Status: COMPLETED | OUTPATIENT
Start: 2018-08-09 | End: 2018-08-09

## 2018-08-09 RX ORDER — ONDANSETRON 2 MG/ML
8 INJECTION INTRAMUSCULAR; INTRAVENOUS
Status: COMPLETED | OUTPATIENT
Start: 2018-08-09 | End: 2018-08-09

## 2018-08-09 RX ORDER — CLONIDINE HYDROCHLORIDE 0.1 MG/1
0.2 TABLET ORAL
Status: COMPLETED | OUTPATIENT
Start: 2018-08-09 | End: 2018-08-10

## 2018-08-09 RX ADMIN — HYDRALAZINE HYDROCHLORIDE 10 MG: 20 INJECTION INTRAMUSCULAR; INTRAVENOUS at 09:08

## 2018-08-09 RX ADMIN — HYDRALAZINE HYDROCHLORIDE 20 MG: 20 INJECTION INTRAMUSCULAR; INTRAVENOUS at 10:08

## 2018-08-09 RX ADMIN — ONDANSETRON 8 MG: 2 INJECTION, SOLUTION INTRAMUSCULAR; INTRAVENOUS at 09:08

## 2018-08-09 RX ADMIN — HYDRALAZINE HYDROCHLORIDE 10 MG: 20 INJECTION INTRAMUSCULAR; INTRAVENOUS at 11:08

## 2018-08-09 NOTE — PROGRESS NOTES
HEMATOLOGIC MALIGNANCIES PROGRESS NOTE    IDENTIFYING STATEMENT   Lucy Perez (Lucy) is a 57 y.o. female with a  of 1960 from [place] with the diagnosis of anemia.      ONCOLOGY HISTORY:    1. Anemia, likely multifactorial              A.  No normal hemoglobin on record. First valuve is from 13 (10.7 g/dl).               B. 17: Hospital follow-up CBC - hgb 7.3. WBC and plt normal. MCV 96.              C. 17: Hgb 7.2. Plt 149. Retic 1.7%. Iron studies normal. Folic acid low (3.4). B12 normal.    D. 10/9/17: Hgb 8.3. Folate 13.1.    E. 17: Hgb 7.5 g/dl.    F. 2017: Hgb 8.1 g/dl.    G. 1/3/2018: Hgb 8.8   H. 2018: Hgb 7.1     2. History of R-MCA stroke with R putaminal hemorrhagic transformation and residual L-sided deficits.  3. S/p PEG  4. Chronic kidney disease, Stage III  5. Sarcoidosis    INTERVAL HISTORY:      Ms. Perez returns to clinic for follow-up of her anemia. She missed her last scheduled appointment on 2018.     She is feeling very tired. She otherwise has not had any significant change since her last visit. She has had removal of her feeding tube and is eating again. Otherwise, she is unchanged.     Past Medical History, Past Social History and Past Family History have been reviewed and are unchanged except as noted in the interval history.    MEDICATIONS:     Prior to Admission medications    Medication Sig Start Date End Date Taking? Authorizing Provider   albuterol (PROVENTIL) 2.5 mg /3 mL (0.083 %) nebulizer solution Take 3 mLs (2.5 mg total) by nebulization every 6 (six) hours as needed for Wheezing. Rescue 17  Beverly Muniz MD   alprazolam (XANAX) 0.5 MG tablet 1/2 to 1 tab Q8 hours as needed for anxiety 17   Beverly Muniz MD   amlodipine (NORVASC) 10 MG tablet Take 1 tablet (10 mg total) by mouth once daily. 17  Beverly Muniz MD   aspirin (ECOTRIN) 81 MG EC tablet Take 81 mg by mouth once  "daily.      Historical Provider, MD   atorvastatin (LIPITOR) 40 MG tablet Take 1 tablet (40 mg total) by mouth once daily. For Cholesterol 10/20/16   Beverly Muniz MD   BD INSULIN PEN NEEDLE UF SHORT 31 gauge x 5/16" Ndle USE TO INJECT NOVOLOG FLEXPEN BEFORE MEALS 5/29/17   Beatriz Aguilera DNP, NP   blood sugar diagnostic (ACCU-CHEK SMARTVIEW TEST STRIP) Strp 1 strip by Misc.(Non-Drug; Combo Route) route 2 (two) times daily. 11/5/15   Beatriz Aguilera DNP, NP   clotrimazole-betamethasone 1-0.05% (LOTRISONE) cream Apply topically 2 (two) times daily. Apply to area of rash/iting on buttocks 1-2x/day as needed 8/10/17   Beverly Muniz MD   colchicine 0.6 mg tablet 1 tab daily as needed for gout flare 6/16/16   Beverly Muniz MD   famotidine (PEPCID) 20 MG tablet TAKE 1 TABLET (20 MG TOTAL) BY MOUTH ONCE DAILY. 5/11/17   Beverly Muniz MD   ferrous sulfate 220 mg (44 mg iron)/5 mL solution 10ml daily for Iron/Give via PEG 8/23/17   Beverly Muniz MD   fluoxetine 20 MG tablet Take 1 tablet (20 mg total) by mouth once daily. 9/21/17   Beverly Muniz MD   folic acid (FOLVITE) 1 MG tablet Take 1 tablet (1 mg total) by mouth once daily. 9/14/17 9/14/18  Beverly Muniz MD   hydrALAZINE (APRESOLINE) 100 MG tablet Take 1 tablet (100 mg total) by mouth 3 (three) times daily. 2/22/17   Beverly Muniz MD   hydrochlorothiazide (HYDRODIURIL) 25 MG tablet Take 25 mg by mouth once daily.    Historical Provider, MD   insulin detemir (LEVEMIR FLEXTOUCH) 100 unit/mL (3 mL) SubQ InPn pen 14 units in AM  Patient taking differently: Inject 14 Units into the skin every evening.  7/12/17   Beverly Muniz MD   labetalol (NORMODYNE) 100 MG tablet Take 500 mg by mouth every 8 (eight) hours.    Historical Provider, MD   levetiracetam (KEPPRA) 500 MG Tab Take 1 tablet (500 mg total) by mouth every 8 (eight) hours. 8/10/17 8/10/18  Beverly Muniz MD   multivitamin with iron Tab 1/day  Patient taking differently: " "Take 1 tablet by mouth once daily.  6/20/16   Beverly Muniz MD   nut.tx.gluc.intol,lac-free,soy (GLUCERNA 1.5 NOAH) Liqd 1.5 cans in AM, 1 can at Noon , 1.5cans at Dinner, and 1 can before Bed/Total 5 cans daily 11/25/16   Beverly Muniz MD   nystatin (MYCOSTATIN) powder Apply topically 2 (two) times daily. Apply to irritated skin 2x/day as needed 8/10/17   Beverly Muniz MD   oxcarbazepine (TRILEPTAL) 150 MG Tab Take 1 tablet (150 mg total) by mouth 2 (two) times daily. 9/15/17 9/15/18  Pola Galloway MD   phenytoin (DILANTIN) 300 MG ER capsule Take 300 mg by mouth every evening.    Historical Provider, MD       ALLERGIES:   Review of patient's allergies indicates:   Allergen Reactions    Lisinopril Other (See Comments)     Angioedema      Vicodin [hydrocodone-acetaminophen] Rash        ROS:       Review of Systems   Constitutional: Negative for diaphoresis, fatigue, fever and unexpected weight change.   HENT:   Negative for lump/mass and sore throat.    Eyes: Negative for icterus.   Respiratory: Negative for cough and shortness of breath.    Cardiovascular: Negative for chest pain and palpitations.   Gastrointestinal: Negative for abdominal distention, constipation, diarrhea, nausea and vomiting.   Genitourinary: Negative for dysuria and frequency.    Musculoskeletal: Negative for arthralgias, gait problem and myalgias.   Skin: Negative for rash.   Neurological: Positive for headaches. Negative for dizziness and gait problem.   Hematological: Negative for adenopathy. Does not bruise/bleed easily.   Psychiatric/Behavioral: The patient is not nervous/anxious.        PHYSICAL EXAM:  Vitals:    08/07/18 1025   BP: (!) 185/77   Pulse: 66   Temp: 98.5 °F (36.9 °C)   TempSrc: Oral   SpO2: 96%   Height: 5' 3" (1.6 m)   PainSc: 0-No pain       Physical Exam   Constitutional: She is oriented to person, place, and time. She appears well-developed and well-nourished. No distress.   She is in a wheelchair. "   HENT:   Mouth/Throat: Mucous membranes are normal. No oral lesions.   S/p craniotomy   Eyes: Conjunctivae are normal.   Neck: No thyromegaly present.   Cardiovascular: Normal rate, regular rhythm and normal heart sounds.    No murmur heard.  Pulmonary/Chest: Breath sounds normal. She has no wheezes. She has no rales.   Abdominal: Soft. She exhibits no distension and no mass. There is no splenomegaly or hepatomegaly. There is no tenderness.   Lymphadenopathy:     She has no cervical adenopathy.        Right cervical: No deep cervical adenopathy present.       Left cervical: No deep cervical adenopathy present.     She has no axillary adenopathy.        Right: No inguinal adenopathy present.        Left: No inguinal adenopathy present.   Neurological: She is alert and oriented to person, place, and time. She has normal strength and normal reflexes. No cranial nerve deficit. Coordination normal.   She has marked weakness on the left compared with the right. Hyperreflexive on the left.     Skin: No rash noted.     LAB:   Results for orders placed or performed in visit on 08/03/18   CBC Without Differential   Result Value Ref Range    WBC 4.57 3.90 - 12.70 K/uL    RBC 2.75 (L) 4.00 - 5.40 M/uL    Hemoglobin 8.5 (L) 12.0 - 16.0 g/dL    Hematocrit 27.9 (L) 37.0 - 48.5 %     (H) 82 - 98 fL    MCH 30.9 27.0 - 31.0 pg    MCHC 30.5 (L) 32.0 - 36.0 g/dL    RDW 12.8 11.5 - 14.5 %    Platelets 180 150 - 350 K/uL    MPV 11.2 9.2 - 12.9 fL   Renal function panel   Result Value Ref Range    Glucose 124 (H) 70 - 110 mg/dL    Sodium 141 136 - 145 mmol/L    Potassium 6.5 (HH) 3.5 - 5.1 mmol/L    Chloride 114 (H) 95 - 110 mmol/L    CO2 14 (L) 23 - 29 mmol/L    BUN, Bld 94 (H) 6 - 20 mg/dL    Calcium 9.5 8.7 - 10.5 mg/dL    Creatinine 3.4 (H) 0.5 - 1.4 mg/dL    Albumin 3.7 3.5 - 5.2 g/dL    Phosphorus 5.1 (H) 2.7 - 4.5 mg/dL    eGFR if  16.2 (A) >60 mL/min/1.73 m^2    eGFR if non  14.1 (A) >60  mL/min/1.73 m^2    Anion Gap 13 8 - 16 mmol/L       PROBLEMS ASSESSED THIS VISIT:    1. Anemia in stage 4 chronic kidney disease    2. Abnormality of gait and mobility     3. Sarcoidosis        PLAN:       Anemia in stage 4 chronic kidney disease  Ms. Perez has substantial anemia that is most likely related to her Stage IV CKD. She has sarcoidosis, which may serve as a nidus for chronic inflammation and may also worsen her anemia.     Nutritional indicies show replete iron status and normal B12 and folate levels.    We will therefore plan to resume darbepoetin injections to help improve her anemia.    It is noted that she has macrocytosis. If her hemoglobin does not improve with ESAs, we may need to consider bone marrow biopsy, but I will try to avoid this in this otherwise medically complex patient with many comorbidities, as the risk is not trivial and the yield is likely low.     Follow-up   - Start darbepoetin  - Return to clinic 4 weeks after first dose    Mike Owen MD  Hematology and Stem Cell Transplant    Distress Screening Results: Psychosocial Distress screening score of Distress Score: 5 noted and reviewed. No intervention indicated. Not oncology related.

## 2018-08-09 NOTE — ASSESSMENT & PLAN NOTE
Ms. Perez has substantial anemia that is most likely related to her Stage IV CKD. She has sarcoidosis, which may serve as a nidus for chronic inflammation and may also worsen her anemia.     Nutritional indicies show replete iron status and normal B12 and folate levels.    We will therefore plan to resume darbepoetin injections to help improve her anemia.    It is noted that she has macrocytosis. If her hemoglobin does not improve with ESAs, we may need to consider bone marrow biopsy, but I will try to avoid this in this otherwise medically complex patient with many comorbidities, as the risk is not trivial and the yield is likely low.

## 2018-08-10 ENCOUNTER — OUTPATIENT CASE MANAGEMENT (OUTPATIENT)
Dept: ADMINISTRATIVE | Facility: OTHER | Age: 58
End: 2018-08-10

## 2018-08-10 PROBLEM — I16.0 HYPERTENSIVE URGENCY: Status: ACTIVE | Noted: 2018-08-10

## 2018-08-10 PROBLEM — E87.5 HYPERKALEMIA: Status: ACTIVE | Noted: 2018-08-10

## 2018-08-10 PROBLEM — E11.65 TYPE 2 DIABETES MELLITUS WITH HYPERGLYCEMIA, WITHOUT LONG-TERM CURRENT USE OF INSULIN: Status: ACTIVE | Noted: 2018-08-10

## 2018-08-10 LAB
BASOPHILS # BLD AUTO: 0.04 K/UL
BASOPHILS NFR BLD: 0.5 %
DIFFERENTIAL METHOD: ABNORMAL
EOSINOPHIL # BLD AUTO: 0 K/UL
EOSINOPHIL NFR BLD: 0.1 %
ERYTHROCYTE [DISTWIDTH] IN BLOOD BY AUTOMATED COUNT: 12.6 %
ESTIMATED AVG GLUCOSE: 114 MG/DL
HBA1C MFR BLD HPLC: 5.6 %
HCT VFR BLD AUTO: 31.3 %
HGB BLD-MCNC: 9.7 G/DL
IMM GRANULOCYTES # BLD AUTO: 0.03 K/UL
IMM GRANULOCYTES NFR BLD AUTO: 0.4 %
LYMPHOCYTES # BLD AUTO: 0.4 K/UL
LYMPHOCYTES NFR BLD: 5.6 %
MCH RBC QN AUTO: 30.7 PG
MCHC RBC AUTO-ENTMCNC: 31 G/DL
MCV RBC AUTO: 99 FL
MONOCYTES # BLD AUTO: 0.2 K/UL
MONOCYTES NFR BLD: 2.6 %
NEUTROPHILS # BLD AUTO: 7.1 K/UL
NEUTROPHILS NFR BLD: 90.8 %
NRBC BLD-RTO: 0 /100 WBC
PLATELET # BLD AUTO: 202 K/UL
PMV BLD AUTO: 10.6 FL
POCT GLUCOSE: 133 MG/DL (ref 70–110)
POCT GLUCOSE: 142 MG/DL (ref 70–110)
POCT GLUCOSE: 153 MG/DL (ref 70–110)
POCT GLUCOSE: 174 MG/DL (ref 70–110)
POCT GLUCOSE: 250 MG/DL (ref 70–110)
RBC # BLD AUTO: 3.16 M/UL
WBC # BLD AUTO: 7.82 K/UL

## 2018-08-10 PROCEDURE — 0JH63XZ INSERTION OF TUNNELED VASCULAR ACCESS DEVICE INTO CHEST SUBCUTANEOUS TISSUE AND FASCIA, PERCUTANEOUS APPROACH: ICD-10-PCS | Performed by: INTERNAL MEDICINE

## 2018-08-10 PROCEDURE — 77001 FLUOROGUIDE FOR VEIN DEVICE: CPT | Mod: 26,,, | Performed by: INTERNAL MEDICINE

## 2018-08-10 PROCEDURE — 99223 1ST HOSP IP/OBS HIGH 75: CPT | Mod: ,,, | Performed by: PHYSICIAN ASSISTANT

## 2018-08-10 PROCEDURE — 25000003 PHARM REV CODE 250

## 2018-08-10 PROCEDURE — 25000003 PHARM REV CODE 250: Performed by: NURSE PRACTITIONER

## 2018-08-10 PROCEDURE — 63600175 PHARM REV CODE 636 W HCPCS

## 2018-08-10 PROCEDURE — 63600175 PHARM REV CODE 636 W HCPCS: Performed by: HOSPITALIST

## 2018-08-10 PROCEDURE — 25000003 PHARM REV CODE 250: Performed by: PHYSICIAN ASSISTANT

## 2018-08-10 PROCEDURE — 76937 US GUIDE VASCULAR ACCESS: CPT | Mod: 26,,, | Performed by: INTERNAL MEDICINE

## 2018-08-10 PROCEDURE — 86580 TB INTRADERMAL TEST: CPT | Performed by: HOSPITALIST

## 2018-08-10 PROCEDURE — 83036 HEMOGLOBIN GLYCOSYLATED A1C: CPT

## 2018-08-10 PROCEDURE — 99152 MOD SED SAME PHYS/QHP 5/>YRS: CPT | Mod: ,,, | Performed by: INTERNAL MEDICINE

## 2018-08-10 PROCEDURE — 36558 INSERT TUNNELED CV CATH: CPT | Mod: ,,, | Performed by: INTERNAL MEDICINE

## 2018-08-10 PROCEDURE — 90935 HEMODIALYSIS ONE EVALUATION: CPT | Mod: ,,, | Performed by: INTERNAL MEDICINE

## 2018-08-10 PROCEDURE — C1894 INTRO/SHEATH, NON-LASER: HCPCS

## 2018-08-10 PROCEDURE — 11000001 HC ACUTE MED/SURG PRIVATE ROOM

## 2018-08-10 PROCEDURE — 99232 SBSQ HOSP IP/OBS MODERATE 35: CPT | Mod: ,,, | Performed by: HOSPITALIST

## 2018-08-10 PROCEDURE — 02H633Z INSERTION OF INFUSION DEVICE INTO RIGHT ATRIUM, PERCUTANEOUS APPROACH: ICD-10-PCS | Performed by: INTERNAL MEDICINE

## 2018-08-10 PROCEDURE — 90935 HEMODIALYSIS ONE EVALUATION: CPT

## 2018-08-10 PROCEDURE — 85025 COMPLETE CBC W/AUTO DIFF WBC: CPT

## 2018-08-10 PROCEDURE — 25000003 PHARM REV CODE 250: Performed by: EMERGENCY MEDICINE

## 2018-08-10 PROCEDURE — 99223 1ST HOSP IP/OBS HIGH 75: CPT | Mod: 25,GC,, | Performed by: INTERNAL MEDICINE

## 2018-08-10 PROCEDURE — C1750 CATH, HEMODIALYSIS,LONG-TERM: HCPCS

## 2018-08-10 PROCEDURE — 63600175 PHARM REV CODE 636 W HCPCS: Performed by: PHYSICIAN ASSISTANT

## 2018-08-10 RX ORDER — LABETALOL HYDROCHLORIDE 5 MG/ML
20 INJECTION, SOLUTION INTRAVENOUS EVERY 4 HOURS PRN
Status: DISCONTINUED | OUTPATIENT
Start: 2018-08-10 | End: 2018-08-18 | Stop reason: HOSPADM

## 2018-08-10 RX ORDER — ASPIRIN 81 MG/1
81 TABLET ORAL DAILY
Status: DISCONTINUED | OUTPATIENT
Start: 2018-08-10 | End: 2018-08-18 | Stop reason: HOSPADM

## 2018-08-10 RX ORDER — IBUPROFEN 200 MG
24 TABLET ORAL
Status: DISCONTINUED | OUTPATIENT
Start: 2018-08-10 | End: 2018-08-18 | Stop reason: HOSPADM

## 2018-08-10 RX ORDER — FUROSEMIDE 40 MG/1
40 TABLET ORAL DAILY
Status: DISCONTINUED | OUTPATIENT
Start: 2018-08-10 | End: 2018-08-18 | Stop reason: HOSPADM

## 2018-08-10 RX ORDER — GLUCAGON 1 MG
1 KIT INJECTION
Status: DISCONTINUED | OUTPATIENT
Start: 2018-08-10 | End: 2018-08-18 | Stop reason: HOSPADM

## 2018-08-10 RX ORDER — FERROUS SULFATE 325(65) MG
325 TABLET, DELAYED RELEASE (ENTERIC COATED) ORAL DAILY
Status: DISCONTINUED | OUTPATIENT
Start: 2018-08-10 | End: 2018-08-13

## 2018-08-10 RX ORDER — CARVEDILOL 12.5 MG/1
12.5 TABLET ORAL 2 TIMES DAILY
Status: DISCONTINUED | OUTPATIENT
Start: 2018-08-10 | End: 2018-08-12

## 2018-08-10 RX ORDER — ACETAMINOPHEN 500 MG
1000 TABLET ORAL EVERY 8 HOURS PRN
Status: DISCONTINUED | OUTPATIENT
Start: 2018-08-10 | End: 2018-08-11

## 2018-08-10 RX ORDER — SODIUM CHLORIDE 9 MG/ML
INJECTION, SOLUTION INTRAVENOUS ONCE
Status: DISCONTINUED | OUTPATIENT
Start: 2018-08-10 | End: 2018-08-18 | Stop reason: HOSPADM

## 2018-08-10 RX ORDER — POLYETHYLENE GLYCOL 3350 17 G/17G
17 POWDER, FOR SOLUTION ORAL DAILY
Status: DISCONTINUED | OUTPATIENT
Start: 2018-08-10 | End: 2018-08-18 | Stop reason: HOSPADM

## 2018-08-10 RX ORDER — ONDANSETRON 8 MG/1
8 TABLET, ORALLY DISINTEGRATING ORAL EVERY 8 HOURS PRN
Status: DISCONTINUED | OUTPATIENT
Start: 2018-08-10 | End: 2018-08-18 | Stop reason: HOSPADM

## 2018-08-10 RX ORDER — SODIUM BICARBONATE 650 MG/1
1300 TABLET ORAL 2 TIMES DAILY
Status: DISCONTINUED | OUTPATIENT
Start: 2018-08-10 | End: 2018-08-18 | Stop reason: HOSPADM

## 2018-08-10 RX ORDER — HYDRALAZINE HYDROCHLORIDE 50 MG/1
100 TABLET, FILM COATED ORAL 3 TIMES DAILY
Status: DISCONTINUED | OUTPATIENT
Start: 2018-08-10 | End: 2018-08-11

## 2018-08-10 RX ORDER — BISACODYL 10 MG
10 SUPPOSITORY, RECTAL RECTAL DAILY PRN
Status: DISCONTINUED | OUTPATIENT
Start: 2018-08-10 | End: 2018-08-18 | Stop reason: HOSPADM

## 2018-08-10 RX ORDER — ACETAMINOPHEN 325 MG/1
650 TABLET ORAL EVERY 4 HOURS PRN
Status: DISCONTINUED | OUTPATIENT
Start: 2018-08-10 | End: 2018-08-18 | Stop reason: HOSPADM

## 2018-08-10 RX ORDER — ATORVASTATIN CALCIUM 20 MG/1
40 TABLET, FILM COATED ORAL DAILY
Status: DISCONTINUED | OUTPATIENT
Start: 2018-08-10 | End: 2018-08-18 | Stop reason: HOSPADM

## 2018-08-10 RX ORDER — LEVETIRACETAM 500 MG/1
500 TABLET ORAL EVERY 8 HOURS
Status: DISCONTINUED | OUTPATIENT
Start: 2018-08-10 | End: 2018-08-18 | Stop reason: HOSPADM

## 2018-08-10 RX ORDER — PREGABALIN 50 MG/1
50 CAPSULE ORAL 2 TIMES DAILY
Status: DISCONTINUED | OUTPATIENT
Start: 2018-08-10 | End: 2018-08-10

## 2018-08-10 RX ORDER — ACETAMINOPHEN 325 MG/1
650 TABLET ORAL EVERY 8 HOURS PRN
Status: DISCONTINUED | OUTPATIENT
Start: 2018-08-10 | End: 2018-08-18 | Stop reason: HOSPADM

## 2018-08-10 RX ORDER — PROCHLORPERAZINE EDISYLATE 5 MG/ML
5 INJECTION INTRAMUSCULAR; INTRAVENOUS EVERY 6 HOURS PRN
Status: DISCONTINUED | OUTPATIENT
Start: 2018-08-10 | End: 2018-08-15 | Stop reason: SDUPTHER

## 2018-08-10 RX ORDER — HEPARIN SODIUM 1000 [USP'U]/ML
1000 INJECTION, SOLUTION INTRAVENOUS; SUBCUTANEOUS
Status: DISCONTINUED | OUTPATIENT
Start: 2018-08-10 | End: 2018-08-18 | Stop reason: HOSPADM

## 2018-08-10 RX ORDER — IBUPROFEN 200 MG
16 TABLET ORAL
Status: DISCONTINUED | OUTPATIENT
Start: 2018-08-10 | End: 2018-08-18 | Stop reason: HOSPADM

## 2018-08-10 RX ORDER — SODIUM CHLORIDE 0.9 % (FLUSH) 0.9 %
5 SYRINGE (ML) INJECTION
Status: DISCONTINUED | OUTPATIENT
Start: 2018-08-10 | End: 2018-08-18 | Stop reason: HOSPADM

## 2018-08-10 RX ORDER — IPRATROPIUM BROMIDE AND ALBUTEROL SULFATE 2.5; .5 MG/3ML; MG/3ML
3 SOLUTION RESPIRATORY (INHALATION) EVERY 4 HOURS PRN
Status: DISCONTINUED | OUTPATIENT
Start: 2018-08-10 | End: 2018-08-18 | Stop reason: HOSPADM

## 2018-08-10 RX ORDER — INSULIN ASPART 100 [IU]/ML
0-5 INJECTION, SOLUTION INTRAVENOUS; SUBCUTANEOUS
Status: DISCONTINUED | OUTPATIENT
Start: 2018-08-10 | End: 2018-08-18 | Stop reason: HOSPADM

## 2018-08-10 RX ORDER — SODIUM CHLORIDE 9 MG/ML
INJECTION, SOLUTION INTRAVENOUS
Status: DISCONTINUED | OUTPATIENT
Start: 2018-08-10 | End: 2018-08-18 | Stop reason: HOSPADM

## 2018-08-10 RX ADMIN — DEXTROSE MONOHYDRATE 25 G: 25 INJECTION, SOLUTION INTRAVENOUS at 01:08

## 2018-08-10 RX ADMIN — CALCIUM GLUCONATE 1 G: 98 INJECTION, SOLUTION INTRAVENOUS at 02:08

## 2018-08-10 RX ADMIN — SODIUM BICARBONATE 650 MG TABLET 1300 MG: at 09:08

## 2018-08-10 RX ADMIN — POLYETHYLENE GLYCOL 3350 17 G: 17 POWDER, FOR SOLUTION ORAL at 09:08

## 2018-08-10 RX ADMIN — HYDRALAZINE HYDROCHLORIDE 100 MG: 50 TABLET ORAL at 09:08

## 2018-08-10 RX ADMIN — ATORVASTATIN CALCIUM 40 MG: 20 TABLET, FILM COATED ORAL at 09:08

## 2018-08-10 RX ADMIN — FUROSEMIDE 40 MG: 40 TABLET ORAL at 09:08

## 2018-08-10 RX ADMIN — NIFEDIPINE 90 MG: 30 TABLET, FILM COATED, EXTENDED RELEASE ORAL at 09:08

## 2018-08-10 RX ADMIN — LEVETIRACETAM 500 MG: 500 TABLET ORAL at 06:08

## 2018-08-10 RX ADMIN — ACETAMINOPHEN 1000 MG: 500 TABLET ORAL at 09:08

## 2018-08-10 RX ADMIN — CARVEDILOL 12.5 MG: 12.5 TABLET, FILM COATED ORAL at 01:08

## 2018-08-10 RX ADMIN — SODIUM POLYSTYRENE SULFONATE 30 G: 15 SUSPENSION ORAL; RECTAL at 12:08

## 2018-08-10 RX ADMIN — SODIUM CHLORIDE 500 ML: 0.9 INJECTION, SOLUTION INTRAVENOUS at 05:08

## 2018-08-10 RX ADMIN — LEVETIRACETAM 500 MG: 500 TABLET ORAL at 09:08

## 2018-08-10 RX ADMIN — NIFEDIPINE 90 MG: 30 TABLET, FILM COATED, EXTENDED RELEASE ORAL at 01:08

## 2018-08-10 RX ADMIN — FERROUS SULFATE TAB EC 325 MG (65 MG FE EQUIVALENT) 325 MG: 325 (65 FE) TABLET DELAYED RESPONSE at 09:08

## 2018-08-10 RX ADMIN — INSULIN HUMAN 6 UNITS: 100 INJECTION, SOLUTION PARENTERAL at 01:08

## 2018-08-10 RX ADMIN — ASPIRIN 81 MG: 81 TABLET, COATED ORAL at 09:08

## 2018-08-10 RX ADMIN — CLONIDINE HYDROCHLORIDE 0.2 MG: 0.1 TABLET ORAL at 12:08

## 2018-08-10 RX ADMIN — CARVEDILOL 12.5 MG: 12.5 TABLET, FILM COATED ORAL at 09:08

## 2018-08-10 NOTE — PLAN OF CARE
Problem: Patient Care Overview  Goal: Plan of Care Review  Outcome: Ongoing (interventions implemented as appropriate)  Explained to pt purpose of Hd how long will run today and amt of fluid to be removed. Reinforcement needed

## 2018-08-10 NOTE — PROGRESS NOTES
[facility] Hospital Medicine  Progress Note    Patient Name: Lucy Perez  MRN: [id number]  Patient Class: IP- Inpatient   Admission Date: 8/9/2018  Length of Stay: 0 days  Attending Physician: Stefano Hargrove MD  Primary Care Provider: Beverly Muniz MD    [facility] Medicine Team: [facility]P MED A Stefano Hargrove MD    Subjective:     Principal Problem:Uremia    HPI:  Lucy Perez is a 57F with diet controlled T2DM, HTN, CVA with left sided weakness, CKD4. She presents today with her  at the request of her nephrologist and PCP for initiation of HD. Today her home health RN noticed her with SOB, increased fatigue, emesis. The patient's PCP was notified and after discussion with nephrology it was recommended she present to the ED for initiation of dialysis. Patient's  provides most of the history, they are reluctant to start HD, but understand the need. Denies cough, fever, chills, dysuria, abdominal pain, eating well as of yesterday, but has not eaten today. She uses oxygen at home PRN -  states she uses it when her potassium is high and she is SOB.    Intake work up with worsening renal function and rise in BUN from baseline to 96, CXR clear, K+ 5.8 with t-wave changes on EKG.    Hospital Course:  No notes on file    Interval History: Patient had R permacath placement by DOMONIQUE, Pt was seen in HD. She is tolerating HD this afternoon. Pain is controlled with pain meds for now.     Review of Systems   Constitutional: Positive for activity change and fatigue. Negative for fever and unexpected weight change.   Respiratory:  Negative for shortness of breath, cough and wheezing.    Cardiovascular: Negative for chest pain, palpitations and leg swelling.   Gastrointestinal:  Negative for abdominal pain.   Genitourinary: Negative for difficulty urinating and dysuria.   Musculoskeletal: Positive for arthralgias, back pain and gait problem (WC bound).   Neurological:  Positive for weakness (L sided, s/p CVA). Negative for seizures, light-headedness and headaches.   Psychiatric/Behavioral: Negative for agitation and confusion.      Objective:     Vital Signs (Most Recent):  Temp: 97.9 °F (36.6 °C) (08/10/18 1753)  Pulse: 66 (08/10/18 1753)  Resp: 17 (08/10/18 1753)  BP: 132/61 (08/10/18 1753)  SpO2: 99 % (08/10/18 1753) Vital Signs (24h Range):  Temp:  [97.9 °F (36.6 °C)-98.9 °F (37.2 °C)] 97.9 °F (36.6 °C)  Pulse:  [61-75] 66  Resp:  [15-26] 17  SpO2:  [92 %-100 %] 99 %  BP: ()/() 132/61     Weight: 90.7 kg (200 lb)  Body mass index is 35.43 kg/m².    Intake/Output Summary (Last 24 hours) at 08/10/18 1758  Last data filed at 08/10/18 1200   Gross per 24 hour   Intake              500 ml   Output                0 ml   Net              500 ml      Physical Exam  Constitutional: She is oriented to person, place, and time. She appears lethargic. She is cooperative. She is easily aroused. No distress. Nasal cannula in place.   Chronically ill, temporal wasting   HENT:   Head: Normocephalic and atraumatic.   Eyes: EOM are normal. Pupils are equal, round, and reactive to light.   Neck: Normal range of motion. Neck supple.   Cardiovascular: Normal rate and regular rhythm.    Murmur heard.  Pulmonary/Chest: Effort normal and breath sounds normal. No respiratory distress. She has no wheezes.   On 2 L NC, SpO2 96%   Abdominal: Soft. Bowel sounds are normal. She exhibits distension. There is no tenderness.   Musculoskeletal: She exhibits no edema.   Neurological: She is oriented to person, place, and time and easily aroused. She appears lethargic.   Left sided weakness, chronic   Skin: She is not diaphoretic.   Psychiatric: She has a normal mood and affect. Her behavior is normal. Judgment and thought content normal.   Nursing note and vitals reviewed.    Computed MELD-Na score unavailable. Necessary lab results were not found in the last year.  Computed MELD score unavailable.  Necessary lab results were not found in the last year.    Significant Labs:  CBC:    Recent Labs  Lab 08/09/18  2043 08/10/18  0134   WBC 8.54 7.82   HGB 9.9* 9.7*   HCT 31.9* 31.3*    202     CMP:    Recent Labs  Lab 08/09/18  2207      K 5.8*   *   CO2 17*   *   BUN 96*   CREATININE 3.6*   CALCIUM 9.6   PROT 7.9   ALBUMIN 3.9   BILITOT 0.5   ALKPHOS 64   AST 15   ALT 9*   ANIONGAP 12   EGFRNONAA 13.1*     PTINR:  No results for input(s): INR in the last 48 hours.    Significant Procedures:   Dobutamine Stress Test with Color Flow: No results found for this or any previous visit.    None    Assessment/Plan:      * Uremia    CKD 4  - with nausea, vomiting, fatigue, BUN higher than baseline, overall worsening renal function  - nephrology consulted, DOMONIQUE placed R permacath for HD, first HD session done 8/10/2018  - awaiting further nepro recs  - outpatient HD lab ordered. SW has been alerted for HD chair set up        Fatigue    - given uremia   - CTM post HD        Hyperkalemia    - 5.8 on admit with peaked T waves on EKG  - give johanny gluconate now, shift with kayexalate and insulin  - maintain on tele, follow AM labs        Type 2 diabetes mellitus with hyperglycemia, without long-term current use of insulin    - diet controlled, diabetic diet, low dose SSI  - A1c 5.8 in 04/2018, recheck in AM        Essential hypertension    Hypertensive Urgency  - Uncontrolled, multiple doses of IV hydralazine in ED unsuccessful  - patient had multiple episodes of emesis throughout the day, was unable to take all PO medications  - give PO meds now with antiemetics, labetalol PRN  - continue home medications: coreg 12.5 mg bid, lasix 40 mg daily, hydralazine 100 mg TID, nifedipine 90 mg daily  - given HD initiation, will monitor can D/C or decrease med if needed        Anemia in stage 4 chronic kidney disease    - chronic and stable,   - EPO per nephrology  - on iron supplementation        History of stroke     - left sided weakness, turn q2h, fall risk, aspiration risk  - continue ASA, statin, keppra          VTE Risk Mitigation         Ordered     heparin (porcine) injection 1,000 Units  As needed (PRN)      08/10/18 1505     IP VTE HIGH RISK PATIENT  Once      08/10/18 0039     IP VTE HIGH RISK PATIENT  Once      08/10/18 0039     Place LUIS hose  Until discontinued      08/10/18 0039     Place sequential compression device  Until discontinued      08/10/18 0039              Stefano Hargrove MD  Department of Hospital Medicine   [facility]

## 2018-08-10 NOTE — HPI
Lucy Perez is a 57F with diet controlled T2DM, HTN, CVA with left sided weakness, CKD4. She presents today with her  at the request of her nephrologist and PCP for initiation of HD. Today her home health RN noticed her with SOB, increased fatigue, emesis. The patient's PCP was notified and after discussion with nephrology it was recommended she present to the ED for initiation of dialysis. Patient's  provides most of the history, they are reluctant to start HD, but understand the need. Denies cough, fever, chills, dysuria, abdominal pain, eating well as of yesterday, but has not eaten today. She uses oxygen at home PRN -  states she uses it when her potassium is high and she is SOB.    Intake work up with worsening renal function and rise in BUN from baseline to 96, CXR clear, K+ 5.8 with t-wave changes on EKG.

## 2018-08-10 NOTE — ASSESSMENT & PLAN NOTE
- 5.8 on admit with peaked T waves on EKG  - give johanny gluconate now, shift with kayexalate and insulin  - maintain on tele, follow AM labs

## 2018-08-10 NOTE — SUBJECTIVE & OBJECTIVE
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    Cortical cataract of both eyes 7/26/2016    Degeneration of lumbar or lumbosacral intervertebral disc 3/5/2013    S/p MRI L-spine 5/2009     Depression     General anesthetics causing adverse effect in therapeutic use     Hemorrhagic cerebrovascular accident (CVA)     8/2016 s/p Hemicraniotomy at [facility] with Left hemiparesis    Hemorrhagic cerebrovascular accident (CVA) 1/3/2018    History of stroke 6/28/2017    s/p R-MCA stroke with R-putaminal hemorrhagic transformation in 8/2016 and 11/2016 (s/p hemicraniotomy at [facility]) with residual L hemiparesis, on AED s/p CVA      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    Hyperlipidemia     Hypertensive retinopathy of both eyes 7/26/2016    Impingement syndrome of right shoulder 7/2/2014    Left ventricular diastolic dysfunction with preserved systolic function 12/15/2015    Mixed anxiety and depressive disorder 3/5/2013    Obesity     THERESA (obstructive sleep apnea) 3/5/2013    No Home CPAP 2ndary to cost     Partial symptomatic epilepsy with complex partial seizures, not intractable, without status epilepticus     PEG (percutaneous endoscopic gastrostomy) status 6/28/2017    Renovascular hypertension 3/5/2013    Will resume home medications: amlodipine, labetalol, and hydralazine Will hold Lisinopril in setting of DARLING.    Rheumatoid arthritis(714.0)     Rotator cuff tear 7/2/2014    S/P breast biopsy, left 3/5/2013    Benign Breast Bx     S/P R shoulder surgery, SAD, DCE, biceps tenotomy 7/15/2014    S/P total hysterectomy 7/10/2013    Sarcoidosis     Stroke 2016    left sided flaccidity, SAH    Type 2 diabetes  "mellitus with both eyes affected by moderate nonproliferative retinopathy without macular edema, without long-term current use of insulin 8/2/2017    Type 2 diabetes mellitus with diabetic polyneuropathy, without long-term current use of insulin 10/22/2015    Type 2 diabetes mellitus with stage 3 chronic kidney disease, without long-term current use of insulin 10/22/2015    Vertebral artery stenosis 3/5/2013    S/p Stenting per Dr Burnett        Past Surgical History:   Procedure Laterality Date    BREAST SURGERY      breast reduction    COLONOSCOPY N/A 8/11/2016    Procedure: COLONOSCOPY;  Surgeon: Jerry Vilchis MD;  Location: [facility] ([address] [place] [address]);  Service: Endoscopy;  Laterality: N/A;  Patient reports difficulty awaking from anesthesia in the past.    HYSTERECTOMY      ROTATOR CUFF REPAIR Right July 9, 2014    right side    Skull surgery      Aneurysm    stent placed      in vertebral artery    TUBAL LIGATION         Review of patient's allergies indicates:   Allergen Reactions    Lisinopril Other (See Comments)     Angioedema      Vicodin [hydrocodone-acetaminophen] Rash       Current Facility-Administered Medications on File Prior to Encounter   Medication    onabotulinumtoxina injection 300 Units     Current Outpatient Prescriptions on File Prior to Encounter   Medication Sig    albuterol (PROVENTIL) 2.5 mg /3 mL (0.083 %) nebulizer solution Take 3 mLs (2.5 mg total) by nebulization every 6 (six) hours as needed for Wheezing. Rescue    aspirin (ECOTRIN) 81 MG EC tablet Take 81 mg by mouth once daily.      atorvastatin (LIPITOR) 40 MG tablet TAKE 1 TABLET ONE TIME DAILY FOR CHOLESTEROL    BD INSULIN PEN NEEDLE UF SHORT 31 gauge x 5/16" Ndle USE TO INJECT NOVOLOG FLEXPEN BEFORE MEALS    blood sugar diagnostic (ACCU-CHEK SMARTVIEW TEST STRIP) Strp 1 strip by Misc.(Non-Drug; Combo Route) route 2 (two) times daily.    carvedilol (COREG) 12.5 MG tablet Pt takes 2 tabs in AM  And 1 tab in PM;Pt is " to take 1 extra Coreg if SBP>160 or DBP>90.    ergocalciferol (ERGOCALCIFEROL) 50,000 unit Cap 1 capsule (50,000 Units total) by Per G Tube route every 7 days.    famotidine (PEPCID) 20 MG tablet Take 1 tablet (20 mg total) by mouth once daily.    ferrous sulfate 220 mg (44 mg iron)/5 mL solution 10ml daily for Iron/Give via PEG    folic acid (FOLVITE) 1 MG tablet Take 1 tablet (1 mg total) by mouth once daily.    furosemide (LASIX) 40 MG tablet Take 1 tablet (40 mg total) by mouth once daily.    hydrALAZINE (APRESOLINE) 100 MG tablet Take 1 tablet (100 mg total) by mouth 3 (three) times daily.    levETIRAcetam (KEPPRA) 500 MG Tab Take 1 tablet (500 mg total) by mouth every 8 (eight) hours.    NIFEdipine (ADALAT CC) 90 MG TbSR Take 1 tablet (90 mg total) by mouth once daily.    pregabalin (LYRICA) 25 MG capsule 1 cap in AM and 2 caps at Bedtime for muscle spasm    traZODone (DESYREL) 50 MG tablet Take 1 tablet (50 mg total) by mouth every evening.    sodium bicarbonate 650 MG tablet Take 2 tablets (1,300 mg total) by mouth 2 (two) times daily.     Family History     Problem Relation (Age of Onset)    Bell's palsy Sister    Blindness Maternal Grandmother    Cancer Mother (55)    Diabetes Mother,     Heart attack Mother    Heart disease Mother    Hypertension Mother, Sister    Lupus Sister    No Known Problems Daughter, Son    Sleep apnea Sister    Stroke Sister, Maternal Aunt        Social History Main Topics    Smoking status: Never Smoker    Smokeless tobacco: Never Used    Alcohol use No      Comment: occasional wine cooler     Drug use: No    Sexual activity: Yes     Partners: Male     Review of Systems   Constitutional: Positive for activity change and fatigue. Negative for fever and unexpected weight change.   Respiratory: Positive for shortness of breath. Negative for cough and wheezing.    Cardiovascular: Negative for chest pain, palpitations and leg swelling.   Gastrointestinal: Positive for  nausea and vomiting. Negative for abdominal pain.   Genitourinary: Negative for difficulty urinating and dysuria.   Musculoskeletal: Positive for arthralgias, back pain and gait problem (WC bound).   Neurological: Positive for weakness (L sided, s/p CVA). Negative for seizures, light-headedness and headaches.   Psychiatric/Behavioral: Negative for agitation and confusion.     Objective:     Vital Signs (Most Recent):  Temp: 98.9 °F (37.2 °C) (08/09/18 1844)  Pulse: 74 (08/10/18 0103)  Resp: 16 (08/10/18 0103)  BP: (!) 180/77 (08/10/18 0103)  SpO2: 97 % (08/10/18 0103) Vital Signs (24h Range):  Temp:  [98.9 °F (37.2 °C)] 98.9 °F (37.2 °C)  Pulse:  [70-75] 74  Resp:  [16-26] 16  SpO2:  [92 %-98 %] 97 %  BP: (175-223)/() 180/77     Weight: 90.7 kg (200 lb)  Body mass index is 35.43 kg/m².    Physical Exam   Constitutional: She is oriented to person, place, and time. She appears lethargic. She is cooperative. She is easily aroused. No distress. Nasal cannula in place.   Chronically ill, temporal wasting   HENT:   Head: Normocephalic and atraumatic.   Eyes: EOM are normal. Pupils are equal, round, and reactive to light.   Neck: Normal range of motion. Neck supple.   Cardiovascular: Normal rate and regular rhythm.    Murmur heard.  Pulmonary/Chest: Effort normal and breath sounds normal. No respiratory distress. She has no wheezes.   On 2 L NC, SpO2 96%   Abdominal: Soft. Bowel sounds are normal. She exhibits distension. There is no tenderness.   Musculoskeletal: She exhibits no edema.   Neurological: She is oriented to person, place, and time and easily aroused. She appears lethargic.   Left sided weakness, chronic   Skin: She is not diaphoretic.   Psychiatric: She has a normal mood and affect. Her behavior is normal. Judgment and thought content normal.   Nursing note and vitals reviewed.        CRANIAL NERVES     CN III, IV, VI   Pupils are equal, round, and reactive to light.  Extraocular motions are normal.         Significant Labs:   BMP:   Recent Labs  Lab 08/09/18 2207   *      K 5.8*   *   CO2 17*   BUN 96*   CREATININE 3.6*   CALCIUM 9.6     CBC:   Recent Labs  Lab 08/09/18 2043   WBC 8.54   HGB 9.9*   HCT 31.9*        CMP:   Recent Labs  Lab 08/09/18 2207      K 5.8*   *   CO2 17*   *   BUN 96*   CREATININE 3.6*   CALCIUM 9.6   PROT 7.9   ALBUMIN 3.9   BILITOT 0.5   ALKPHOS 64   AST 15   ALT 9*   ANIONGAP 12   EGFRNONAA 13.1*     Cardiac Markers:   Recent Labs  Lab 08/09/18  2043   *     Magnesium: No results for input(s): MG in the last 48 hours.  Troponin: No results for input(s): TROPONINI in the last 48 hours.  TSH:   Recent Labs  Lab 02/22/18  0350   TSH 0.516     Urine Culture: No results for input(s): LABURIN in the last 48 hours.    Significant Imaging: CXR: I have reviewed all pertinent results/findings within the past 24 hours and my personal findings are:  clear  EKG: I have reviewed all pertinent results/findings within the past 24 hours and my personal findings are: no peaked T waves

## 2018-08-10 NOTE — PROGRESS NOTES
Pt left the floor for cath lab to insert dialysis catheter . Pt is  Arouse to voice stable V/S. Pt is NPO . Pt left on her bed with transporter .

## 2018-08-10 NOTE — CONSULTS
[facility]  Nephrology  Consult Note    Patient Name: Lucy Perez  MRN: [id number]  Admission Date: 8/9/2018  Hospital Length of Stay: 0 days  Attending Provider: Stefano Hargrove MD   Primary Care Physician: Beverly Muniz MD  Principal Problem:Uremia    Inpatient consult to Nephrology  Consult performed by: LOLITA BRITO  Consult ordered by: CAMERON LEONARDO  Reason for consult: dialysis initiation        Subjective:     HPI: 58 yo AAF with diet controlled T2DM, HTN, CVA with left sided weakness, CKD4 (HTN and T2DM). Has been experiencing increasing fatigue, tiredness, nausea, episodes of vomiting.  She has resisted dialysis discussion with her nephrologist, however her labs and CKD has been progressing rapidly over the past year, labs were checked yesterday and note with Bun:Cr of 96:3.6, after discussion with her PCP and nephrologist (Dr Blank) it was decided she be admitted for HD initiation, she unfortunately does not have access.       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    Cortical cataract of both eyes 7/26/2016    Degeneration of lumbar or lumbosacral intervertebral disc 3/5/2013    S/p MRI L-spine 5/2009     Depression     General anesthetics causing adverse effect in therapeutic use     Hemorrhagic cerebrovascular accident (CVA)     8/2016 s/p Hemicraniotomy at [facility] with Left hemiparesis    Hemorrhagic cerebrovascular accident (CVA) 1/3/2018    History of stroke 6/28/2017    s/p R-MCA stroke with R-putaminal hemorrhagic transformation in 8/2016 and 11/2016 (s/p hemicraniotomy at [facility]) with residual L hemiparesis, on AED s/p CVA      HSV-2 infection  3/5/2013    Hyperlipidemia     Hypertensive retinopathy of both eyes 7/26/2016    Impingement syndrome of right shoulder 7/2/2014    Left ventricular diastolic dysfunction with preserved systolic function 12/15/2015    Mixed anxiety and depressive disorder 3/5/2013    Obesity     THERESA (obstructive sleep apnea) 3/5/2013    No Home CPAP 2ndary to cost     Partial symptomatic epilepsy with complex partial seizures, not intractable, without status epilepticus     PEG (percutaneous endoscopic gastrostomy) status 6/28/2017    Renovascular hypertension 3/5/2013    Will resume home medications: amlodipine, labetalol, and hydralazine Will hold Lisinopril in setting of DARLING.   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 without macular edema, without long-term current use of insulin 8/2/2017    Type 2 diabetes mellitus with diabetic polyneuropathy, without long-term current use of insulin 10/22/2015    Type 2 diabetes mellitus with stage 3 chronic kidney disease, without long-term current use of insulin 10/22/2015    Vertebral artery stenosis 3/5/2013    S/p Stenting per Dr Burnett        Past Surgical History:   Procedure Laterality Date    BREAST SURGERY      breast reduction    COLONOSCOPY N/A 8/11/2016    Procedure: COLONOSCOPY;  Surgeon: Jerry Vilchis MD;  Location: [address]);  Service: Endoscopy;  Laterality: N/A;  Patient reports difficulty awaking from anesthesia in the past.    HYSTERECTOMY      ROTATOR CUFF REPAIR Right July 9, 2014    right side    Skull surgery      Aneurysm    stent placed      in vertebral artery    TUBAL LIGATION         Review of  patient's allergies indicates:   Allergen Reactions    Lisinopril Other (See Comments)     Angioedema      Vicodin [hydrocodone-acetaminophen] Rash     Current Facility-Administered Medications   Medication Frequency    0.9%  NaCl infusion PRN    0.9%  NaCl infusion Once    acetaminophen tablet 1,000 mg Q8H PRN    acetaminophen tablet 650 mg Q4H PRN    acetaminophen tablet 650 mg Q8H PRN    albuterol-ipratropium 2.5 mg-0.5 mg/3 mL nebulizer solution 3 mL Q4H PRN    aspirin EC tablet 81 mg Daily    atorvastatin tablet 40 mg Daily    bisacodyl suppository 10 mg Daily PRN    calcium gluconate 1 g in dextrose 5 % 100 mL IVPB Q10 Min PRN    carvedilol tablet 12.5 mg BID    dextrose 50% injection 12.5 g PRN    dextrose 50% injection 25 g PRN    dextrose 50% injection 25 g PRN    ferrous sulfate EC tablet 325 mg Daily    furosemide tablet 40 mg Daily    glucagon (human recombinant) injection 1 mg PRN    glucose chewable tablet 16 g PRN    glucose chewable tablet 24 g PRN    hydrALAZINE tablet 100 mg TID    insulin aspart U-100 pen 0-5 Units QID (AC + HS) PRN    labetalol injection 20 mg Q4H PRN    levETIRAcetam tablet 500 mg Q8H    NIFEdipine 24 hr tablet 90 mg QHS    ondansetron disintegrating tablet 8 mg Q8H PRN    polyethylene glycol packet 17 g Daily    prochlorperazine injection Soln 5 mg Q6H PRN    sodium bicarbonate tablet 1,300 mg BID    sodium chloride 0.9% flush 5 mL PRN     Family History     Problem Relation (Age of Onset)    Bell's palsy Sister    Blindness Maternal Grandmother    Cancer Mother (55)    Diabetes Mother,     Heart attack Mother    Heart disease Mother    Hypertension Mother, Sister    Lupus Sister    No Known Problems Daughter, Son    Sleep apnea Sister    Stroke Sister, Maternal Aunt        Social History Main Topics    Smoking status: Never Smoker    Smokeless tobacco: Never Used    Alcohol use No      Comment: occasional wine cooler     Drug use: No     Sexual activity: Yes     Partners: Male     Review of Systems   Constitutional: Negative for chills, fatigue and fever.   Respiratory: Negative for chest tightness and shortness of breath.    Cardiovascular: Negative for chest pain and leg swelling.   Gastrointestinal: Negative for abdominal distention, abdominal pain, diarrhea and nausea.   Genitourinary: Negative for decreased urine volume, difficulty urinating, flank pain, frequency, hematuria and urgency.   Skin: Negative for rash.   Neurological: Negative for tremors, speech difficulty and weakness.     Objective:     Vital Signs (Most Recent):  Temp: 98.3 °F (36.8 °C) (08/10/18 1138)  Pulse: 61 (08/10/18 1138)  Resp: 18 (08/10/18 1138)  BP: 130/60 (08/10/18 1138)  SpO2: 99 % (08/10/18 1138)  O2 Device (Oxygen Therapy): nasal cannula (08/10/18 1138) Vital Signs (24h Range):  Temp:  [98 °F (36.7 °C)-98.9 °F (37.2 °C)] 98.3 °F (36.8 °C)  Pulse:  [61-75] 61  Resp:  [15-26] 18  SpO2:  [92 %-100 %] 99 %  BP: ()/() 130/60     Weight: 90.7 kg (200 lb) (08/09/18 1844)  Body mass index is 35.43 kg/m².  Body surface area is 2.01 meters squared.    I/O last 3 completed shifts:  In: 500 [IV Piggyback:500]  Out: -     Physical Exam   HENT:   Head: Atraumatic.   Neck: No JVD present.   Cardiovascular: Normal rate and regular rhythm.    Pulmonary/Chest: Effort normal and breath sounds normal.   Abdominal: Soft. She exhibits no distension.   Musculoskeletal: She exhibits no edema or tenderness.   Neurological:   +asterixis   Skin: Skin is warm.         Assessment/Plan:     Hyperkalemia    -no reported EKG changes, 5.8, will be tentatively getting HD following perm cath placement        Hypertensive urgency    -currently controlled, management reviewed        Anemia in stage 4 chronic kidney disease    Hgb 9.4, can continue manage outpatient        Stage 4 chronic kidney disease    Now progressing towards stage V, etiology HTN/DM, now with worsening electrolytes and  uremic symptoms (lethargy, nausea, vomiting and asterixis on exam), admitted for tentative RRT initiation.    Plan/Recommendations:  1) perm cath placement this afternoon  2) intiation of RRT following perm cath placement            Thank you for your consult. I will follow-up with patient. Please contact us if you have any additional questions.    Dallas Cordero MD  Nephrology  [facility]    ATTENDING PHYSICIAN ATTESTATION  I have personally interviewed and examined the patient. I thoroughly reviewed the demographic, clinical, laboratorial and imaging information available in medical records. I agree with the assessment and recommendations provided by the subspecialty resident. Dr. Cordero was under my supervision.

## 2018-08-10 NOTE — PROGRESS NOTES
HD completed w/o complications, blood returned and catheter locked with saline and 1.6u of heparin in each lumen. 1/2l of fluid removed over 2.5hrs

## 2018-08-10 NOTE — H&P
[facility] Medicine  History & Physical    Patient Name: Lucy Perez  MRN: [id number]  Admission Date: 8/9/2018  Attending Physician: Stefano Hargrove MD   Primary Care Provider: Beverly Muniz MD    [facility] Team: [facility] MED A Monty Herbert PA-C     Patient information was obtained from patient, spouse/SO, past medical records and ER records.     Subjective:     Principal Problem:Uremia    Chief Complaint:   Chief Complaint   Patient presents with    Fatigue     sent for decreased renal fxn; hx aneurysm        HPI: Lucy Perez is a 57F with diet controlled T2DM, HTN, CVA with left sided weakness, CKD4. She presents today with her  at the request of her nephrologist and PCP for initiation of HD. Today her home health RN noticed her with SOB, increased fatigue, emesis. The patient's PCP was notified and after discussion with nephrology it was recommended she present to the ED for initiation of dialysis. Patient's  provides most of the history, they are reluctant to start HD, but understand the need. Denies cough, fever, chills, dysuria, abdominal pain, eating well as of yesterday, but has not eaten today. She uses oxygen at home PRN -  states she uses it when her potassium is high and she is SOB.    Intake work up with worsening renal function and rise in BUN from baseline to 96, CXR clear, K+ 5.8 with t-wave changes on EKG.    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    Cortical cataract of both eyes 7/26/2016    Degeneration of lumbar or lumbosacral intervertebral disc 3/5/2013    S/p MRI L-spine 5/2009      Depression     General anesthetics causing adverse effect in therapeutic use     Hemorrhagic cerebrovascular accident (CVA)     8/2016 [facility] with Left hemiparesis    Hemorrhagic cerebrovascular accident (CVA) 1/3/2018    History of stroke 6/28/2017    s/p R-MCA stroke with R-putaminal hemorrhagic transformation in 8/2016 and 11/2016 (s/p [facility]micraniotomy [facility]) with residual L hemiparesis, on AED s/p CVA      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    Hyperlipidemia     Hypertensive retinopathy of both eyes 7/26/2016    Impingement syndrome of right shoulder 7/2/2014    Left ventricular diastolic dysfunction with preserved systolic function 12/15/2015    Mixed anxiety and depressive disorder 3/5/2013    Obesity     THERESA (obstructive sleep apnea) 3/5/2013    No Home CPAP 2ndary to cost     Partial symptomatic epilepsy with complex partial seizures, not intractable, without status epilepticus     PEG (percutaneous endoscopic gastrostomy) status 6/28/2017    Renovascular hypertension 3/5/2013    Will resume home medications: amlodipine, labetalol, and hydralazine Will hold Lisinopril in setting of DARLING.   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 without macular edema, without long-term current use of insulin 8/2/2017    Type 2 diabetes mellitus with diabetic polyneuropathy, without long-term current use of insulin 10/22/2015    Type 2 diabetes mellitus with stage 3 chronic kidney disease, without long-term current use of insulin 10/22/2015    Vertebral artery stenosis 3/5/2013    S/p Stenting per Dr Burnett        Past Surgical History:   Procedure Laterality Date     "BREAST SURGERY      breast reduction    COLONOSCOPY N/A 8/11/2016    Procedure: COLONOSCOPY;  Surgeon: Jerry Vilchis MD;  Location: [facility] ([address] [place] [address]);  Service: Endoscopy;  Laterality: N/A;  Patient reports difficulty awaking from anesthesia in the past.    HYSTERECTOMY      ROTATOR CUFF REPAIR Right July 9, 2014    right side    Skull surgery      Aneurysm    stent placed      in vertebral artery    TUBAL LIGATION         Review of patient's allergies indicates:   Allergen Reactions    Lisinopril Other (See Comments)     Angioedema      Vicodin [hydrocodone-acetaminophen] Rash       Current Facility-Administered Medications on File Prior to Encounter   Medication    onabotulinumtoxina injection 300 Units     Current Outpatient Prescriptions on File Prior to Encounter   Medication Sig    albuterol (PROVENTIL) 2.5 mg /3 mL (0.083 %) nebulizer solution Take 3 mLs (2.5 mg total) by nebulization every 6 (six) hours as needed for Wheezing. Rescue    aspirin (ECOTRIN) 81 MG EC tablet Take 81 mg by mouth once daily.      atorvastatin (LIPITOR) 40 MG tablet TAKE 1 TABLET ONE TIME DAILY FOR CHOLESTEROL    BD INSULIN PEN NEEDLE UF SHORT 31 gauge x 5/16" Ndle USE TO INJECT NOVOLOG FLEXPEN BEFORE MEALS    blood sugar diagnostic (ACCU-CHEK SMARTVIEW TEST STRIP) Strp 1 strip by Misc.(Non-Drug; Combo Route) route 2 (two) times daily.    carvedilol (COREG) 12.5 MG tablet Pt takes 2 tabs in AM  And 1 tab in PM;Pt is to take 1 extra Coreg if SBP>160 or DBP>90.    ergocalciferol (ERGOCALCIFEROL) 50,000 unit Cap 1 capsule (50,000 Units total) by Per G Tube route every 7 days.    famotidine (PEPCID) 20 MG tablet Take 1 tablet (20 mg total) by mouth once daily.    ferrous sulfate 220 mg (44 mg iron)/5 mL solution 10ml daily for Iron/Give via PEG    folic acid (FOLVITE) 1 MG tablet Take 1 tablet (1 mg total) by mouth once daily.    furosemide (LASIX) 40 MG tablet Take 1 tablet (40 mg total) by mouth once daily.    " hydrALAZINE (APRESOLINE) 100 MG tablet Take 1 tablet (100 mg total) by mouth 3 (three) times daily.    levETIRAcetam (KEPPRA) 500 MG Tab Take 1 tablet (500 mg total) by mouth every 8 (eight) hours.    NIFEdipine (ADALAT CC) 90 MG TbSR Take 1 tablet (90 mg total) by mouth once daily.    pregabalin (LYRICA) 25 MG capsule 1 cap in AM and 2 caps at Bedtime for muscle spasm    traZODone (DESYREL) 50 MG tablet Take 1 tablet (50 mg total) by mouth every evening.    sodium bicarbonate 650 MG tablet Take 2 tablets (1,300 mg total) by mouth 2 (two) times daily.     Family History     Problem Relation (Age of Onset)    Bell's palsy Sister    Blindness Maternal Grandmother    Cancer Mother (55)    Diabetes Mother,     Heart attack Mother    Heart disease Mother    Hypertension Mother, Sister    Lupus Sister    No Known Problems Daughter, Son    Sleep apnea Sister    Stroke Sister, Maternal Aunt        Social History Main Topics    Smoking status: Never Smoker    Smokeless tobacco: Never Used    Alcohol use No      Comment: occasional wine cooler     Drug use: No    Sexual activity: Yes     Partners: Male     Review of Systems   Constitutional: Positive for activity change and fatigue. Negative for fever and unexpected weight change.   Respiratory: Positive for shortness of breath. Negative for cough and wheezing.    Cardiovascular: Negative for chest pain, palpitations and leg swelling.   Gastrointestinal: Positive for nausea and vomiting. Negative for abdominal pain.   Genitourinary: Negative for difficulty urinating and dysuria.   Musculoskeletal: Positive for arthralgias, back pain and gait problem (WC bound).   Neurological: Positive for weakness (L sided, s/p CVA). Negative for seizures, light-headedness and headaches.   Psychiatric/Behavioral: Negative for agitation and confusion.     Objective:     Vital Signs (Most Recent):  Temp: 98.9 °F (37.2 °C) (08/09/18 1844)  Pulse: 74 (08/10/18 0103)  Resp: 16 (08/10/18  0103)  BP: (!) 180/77 (08/10/18 0103)  SpO2: 97 % (08/10/18 0103) Vital Signs (24h Range):  Temp:  [98.9 °F (37.2 °C)] 98.9 °F (37.2 °C)  Pulse:  [70-75] 74  Resp:  [16-26] 16  SpO2:  [92 %-98 %] 97 %  BP: (175-223)/() 180/77     Weight: 90.7 kg (200 lb)  Body mass index is 35.43 kg/m².    Physical Exam   Constitutional: She is oriented to person, place, and time. She appears lethargic. She is cooperative. She is easily aroused. No distress. Nasal cannula in place.   Chronically ill, temporal wasting   HENT:   Head: Normocephalic and atraumatic.   Eyes: EOM are normal. Pupils are equal, round, and reactive to light.   Neck: Normal range of motion. Neck supple.   Cardiovascular: Normal rate and regular rhythm.    Murmur heard.  Pulmonary/Chest: Effort normal and breath sounds normal. No respiratory distress. She has no wheezes.   On 2 L NC, SpO2 96%   Abdominal: Soft. Bowel sounds are normal. She exhibits distension. There is no tenderness.   Musculoskeletal: She exhibits no edema.   Neurological: She is oriented to person, place, and time and easily aroused. She appears lethargic.   Left sided weakness, chronic   Skin: She is not diaphoretic.   Psychiatric: She has a normal mood and affect. Her behavior is normal. Judgment and thought content normal.   Nursing note and vitals reviewed.        CRANIAL NERVES     CN III, IV, VI   Pupils are equal, round, and reactive to light.  Extraocular motions are normal.        Significant Labs:   BMP:   Recent Labs  Lab 08/09/18 2207   *      K 5.8*   *   CO2 17*   BUN 96*   CREATININE 3.6*   CALCIUM 9.6     CBC:   Recent Labs  Lab 08/09/18  2043   WBC 8.54   HGB 9.9*   HCT 31.9*        CMP:   Recent Labs  Lab 08/09/18 2207      K 5.8*   *   CO2 17*   *   BUN 96*   CREATININE 3.6*   CALCIUM 9.6   PROT 7.9   ALBUMIN 3.9   BILITOT 0.5   ALKPHOS 64   AST 15   ALT 9*   ANIONGAP 12   EGFRNONAA 13.1*     Cardiac Markers:   Recent  Labs  Lab 08/09/18  2043   *     Magnesium: No results for input(s): MG in the last 48 hours.  Troponin: No results for input(s): TROPONINI in the last 48 hours.  TSH:   Recent Labs  Lab 02/22/18  0350   TSH 0.516     Urine Culture: No results for input(s): LABURIN in the last 48 hours.    Significant Imaging: CXR: I have reviewed all pertinent results/findings within the past 24 hours and my personal findings are:  clear  EKG: I have reviewed all pertinent results/findings within the past 24 hours and my personal findings are: no peaked T waves    Assessment/Plan:     * Uremia    CKD 4    - with nausea, vomiting, fatigue, BUN higher than baseline, overall worsening renal function  - nephrology consulted for consideration of initiation of HD  - keep NPO for now  - continue bicarb        Hyperkalemia    - 5.8 on admit with peaked T waves on EKG  - give johanny gluconate now, shift with kayexalate and insulin  - maintain on tele, follow AM labs        Essential hypertension    Hypertensive Urgency    - Uncontrolled, multiple doses of IV hydralazine in ED unsuccessful  - patient had multiple episodes of emesis throughout the day, was unable to take all PO medications  - give PO meds now with antiemetics, labetalol PRN  - continue home medications: coreg 12.5 mg bid, lasix 40 mg daily, hydralazine 100 mg TID, nifedipine 90 mg daily        Type 2 diabetes mellitus with hyperglycemia, without long-term current use of insulin    - diet controlled, diabetic diet, low dose SSI  - A1c 5.8 in 04/2018, recheck in AM        Anemia in stage 4 chronic kidney disease    - chronic and stable,   - EPO per nephrology  - on iron supplementation        History of stroke    - left sided weakness, turn q2h, fall risk, aspiration risk  - continue ASA, statin, keppra          VTE Risk Mitigation         Ordered     IP VTE HIGH RISK PATIENT  Once      08/10/18 0039     IP VTE HIGH RISK PATIENT  Once      08/10/18 0039     Place LUIS hose   Until discontinued      08/10/18 0039     Place sequential compression device  Until discontinued      08/10/18 0039             Monty Herbert PA-C  Department of Hospital Medicine   [facility]

## 2018-08-10 NOTE — ASSESSMENT & PLAN NOTE
CKD 4    - with nausea, vomiting, fatigue, BUN higher than baseline, overall worsening renal function  - nephrology consulted for consideration of initiation of HD  - keep NPO for now  - continue bicarb

## 2018-08-10 NOTE — PLAN OF CARE
08/10/18 1147   Discharge Assessment   Assessment Type Discharge Planning Assessment   Confirmed/corrected address and phone number on facesheet? Yes   Assessment information obtained from? Patient;Caregiver   Expected Length of Stay (days) 3   Communicated expected length of stay with patient/caregiver yes   Prior to hospitilization cognitive status: Alert/Oriented   Prior to hospitalization functional status: Wheelchair Bound   Current cognitive status: Alert/Oriented   Current Functional Status: Wheelchair Bound   Lives With spouse   Able to Return to Prior Arrangements yes   Is patient able to care for self after discharge? Yes   Patient's perception of discharge disposition home health   Readmission Within The Last 30 Days no previous admission in last 30 days   Patient currently being followed by outpatient case management? No   Patient currently receives any other outside agency services? No   Equipment Currently Used at Home wheelchair;oxygen;lift device;shower chair;3-in-1 commode;hospital bed;commode   Do you have any problems affording any of your prescribed medications? No   Is the patient taking medications as prescribed? yes   Does the patient have transportation home? Yes   Transportation Available family or friend will provide   Does the patient receive services at the [facility]? No  ([facility])   Discharge Plan A Home Health;Home with family   Discharge Plan B Home Health;Home with family   Patient/Family In Agreement With Plan yes   has portable oxygen tanks/concentrator.  nephrologist and PCP for initiation of HD---see notes.MD to enter HD lab sets  edilia bound  Wants to resume w [facility]

## 2018-08-10 NOTE — ASSESSMENT & PLAN NOTE
Now progressing towards stage V, etiology HTN/DM, now with worsening electrolytes and uremic symptoms (lethargy, nausea, vomiting and asterixis on exam), admitted for tentative RRT initiation.    Plan/Recommendations:  1) perm cath placement this afternoon  2) intiation of RRT following perm cath placement

## 2018-08-10 NOTE — ASSESSMENT & PLAN NOTE
Hypertensive Urgency  - Uncontrolled, multiple doses of IV hydralazine in ED unsuccessful  - patient had multiple episodes of emesis throughout the day, was unable to take all PO medications  - give PO meds now with antiemetics, labetalol PRN  - continue home medications: coreg 12.5 mg bid, lasix 40 mg daily, hydralazine 100 mg TID, nifedipine 90 mg daily  - given HD initiation, will monitor can D/C or decrease med if needed

## 2018-08-10 NOTE — PROCEDURES
Procedure Date: 8/10/18    (s) and Role:   Meño Corley MD    Indication: HD initiation     Pre-op Diagnosis:    CKD V    Post-op Diagnosis;  CKD V    Procedure:   1. Insertion Tunneled HD Catheter with Fluoro   2. Conscious Sedation     Anesthesia: IV Sedation + Local     Findings/Key Components:   Catheter placed in RA. Good flush and draw through each port.  Estimated Blood Loss: 5mL     Specimens     None         PROCEDURE: After obtaining consent, the patient was taken to the [facility] suite. Sedation was administered. The right neck and chest were prepped and draped in usual sterile fashion. We began using ultrasound guidance to examine the right internal jugular vein. It appeared to be widely patent and was free of thrombus that appeared suitable for access for HD catheter. We accessed the internal jugular vein using a Seldinger technique with an micropunture needle without difficulty, then the thin wire was passed into the superior vena cava through the heart into the inferior vena cava. The wire position was confirmed with intraoperative fluoroscopy, then a 5Fr sheath was introduced over the wire. The wire was removed and the the guidewire was passed into the IVC under fluoroscopic guidance. Counterincision was made at the venotomy and on the chest wall just below the clavicle. Local anesthesia was used to anesthetize the track and a tunneler was used to pass the 19cm GlidePath catheter underneath the chest wall until the felt cuff was just underneath the counter incision. The 5fr sheath was removed and the vein was dilated using serial dilators. Then the large peel-away sheath was then inserted over the guidewire under fluoroscopic guidance. The dilator and wire were removed. The catheter was placed through the peel-away sheath and positioned appropriately at center of RA. Fluoroscopy was used to confirm that the catheter tip was in appropriate position and that there was no kinking at the apex of  the catheter. A permanent recording of the catheter position was also taken with fluoroscopy. Both lumens had excellent draw and flush. The catheter was then secured in place with 3-0 Prolene. The counterincision in the neck was closed with a 3-0 Vicryl. There were no immediate complications. Patient tolerated the procedure well and discharged in stable condition.     Anesthesia:  Conscious sedation was achieved with 50 mcg of Fentanyl and 0.5 mg of Midazolam (Versed) 1MG/1ML. Local anesthesia was achieved with 20 ml of Lidocaine 2%. Moderate conscious sedation was performed and cardiorespiratory functions were monitored the entire procedure by me and RN. Sedation time 30 minutes.

## 2018-08-10 NOTE — ED TRIAGE NOTES
Pt presents to ED per advice of PCP due to fatigue and abnormal labwork. Pt has decreased renal function. Per family pt had emesis x2 after taking BP meds, and a boil on the vaginal region. Hx of stroke/aneurysm and has left sided deficit. Pt denies chest pain, fever/chills.    Patient identifiers verified and correct for Lucy Perez.  LOC: The patient is awake, alert and aware of environment with an appropriate affect, the patient is oriented x 3 and speaking appropriately.   APPEARANCE: Patient appears comfortable and in no acute distress, patient is clean and well groomed.  SKIN: The skin is warm and dry, color consistent with ethnicity, patient has normal skin turgor and moist mucus membranes, skin intact, no breakdown or bruising noted.   MUSCULOSKELETAL: Patient moving right sided extremities spontaneously, no swelling noted.  RESPIRATORY: Airway is open and patent, respirations are spontaneous, patient has a normal effort and rate, no accessory muscle use noted, pt placed on continuous pulse ox with O2 sats noted at 99% via 2L NC.  CARDIAC: Pt placed on cardiac monitor. Patient has a normal rate and regular rhythm, no edema noted, capillary refill < 3 seconds.   GASTRO: Soft and non tender to palpation, no distention noted, normoactive bowel sounds present in all four quadrants. Pt states bowel movements have been regular.  : Pt denies any pain or frequency with urination.  NEURO: Pt opens eyes spontaneously, behavior appropriate to situation, follows commands, facial expression symmetrical, bilateral hand grasp equal and even, purposeful motor response noted, normal sensation in all extremities when touched with a finger.

## 2018-08-10 NOTE — SUBJECTIVE & OBJECTIVE
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    Cortical cataract of both eyes 7/26/2016    Degeneration of lumbar or lumbosacral intervertebral disc 3/5/2013    S/p MRI L-spine 5/2009     Depression     General anesthetics causing adverse effect in therapeutic use     Hemorrhagic cerebrovascular accident (CVA)     8/2016 s/p Hemicraniotomy at [facility] [place] with Left hemiparesis    Hemorrhagic cerebrovascular accident (CVA) 1/3/2018    History of stroke 6/28/2017    s/p R-MCA stroke with R-putaminal hemorrhagic transformation in 8/2016 and 11/2016 (s/p hemicraniotomy at [facility] [place]) with residual L hemiparesis, on AED s/p CVA      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    Hyperlipidemia     Hypertensive retinopathy of both eyes 7/26/2016    Impingement syndrome of right shoulder 7/2/2014    Left ventricular diastolic dysfunction with preserved systolic function 12/15/2015    Mixed anxiety and depressive disorder 3/5/2013    Obesity     THERESA (obstructive sleep apnea) 3/5/2013    No Home CPAP 2ndary to cost     Partial symptomatic epilepsy with complex partial seizures, not intractable, without status epilepticus     PEG (percutaneous endoscopic gastrostomy) status 6/28/2017    Renovascular hypertension 3/5/2013    Will resume home medications: amlodipine, labetalol, and hydralazine Will hold Lisinopril in setting of DARLING.    Rheumatoid arthritis(714.0)     Rotator cuff tear 7/2/2014    S/P breast biopsy, left 3/5/2013    Benign Breast Bx     S/P R shoulder surgery, SAD, DCE, biceps tenotomy 7/15/2014    S/P total hysterectomy 7/10/2013    Sarcoidosis     Stroke 2016    left sided flaccidity, SAH    Type 2 diabetes  mellitus with both eyes affected by moderate nonproliferative retinopathy without macular edema, without long-term current use of insulin 8/2/2017    Type 2 diabetes mellitus with diabetic polyneuropathy, without long-term current use of insulin 10/22/2015    Type 2 diabetes mellitus with stage 3 chronic kidney disease, without long-term current use of insulin 10/22/2015    Vertebral artery stenosis 3/5/2013    S/p Stenting per Dr Burnett        Past Surgical History:   Procedure Laterality Date    BREAST SURGERY      breast reduction    COLONOSCOPY N/A 8/11/2016    Procedure: COLONOSCOPY;  Surgeon: Jerry Vilchis MD;  Location: [facility] ([address] [place] [address]);  Service: Endoscopy;  Laterality: N/A;  Patient reports difficulty awaking from anesthesia in the past.    HYSTERECTOMY      ROTATOR CUFF REPAIR Right July 9, 2014    right side    Skull surgery      Aneurysm    stent placed      in vertebral artery    TUBAL LIGATION         Review of patient's allergies indicates:   Allergen Reactions    Lisinopril Other (See Comments)     Angioedema      Vicodin [hydrocodone-acetaminophen] Rash     Current Facility-Administered Medications   Medication Frequency    0.9%  NaCl infusion PRN    0.9%  NaCl infusion Once    acetaminophen tablet 1,000 mg Q8H PRN    acetaminophen tablet 650 mg Q4H PRN    acetaminophen tablet 650 mg Q8H PRN    albuterol-ipratropium 2.5 mg-0.5 mg/3 mL nebulizer solution 3 mL Q4H PRN    aspirin EC tablet 81 mg Daily    atorvastatin tablet 40 mg Daily    bisacodyl suppository 10 mg Daily PRN    calcium gluconate 1 g in dextrose 5 % 100 mL IVPB Q10 Min PRN    carvedilol tablet 12.5 mg BID    dextrose 50% injection 12.5 g PRN    dextrose 50% injection 25 g PRN    dextrose 50% injection 25 g PRN    ferrous sulfate EC tablet 325 mg Daily    furosemide tablet 40 mg Daily    glucagon (human recombinant) injection 1 mg PRN    glucose chewable tablet 16 g PRN    glucose chewable tablet 24 g  PRN    hydrALAZINE tablet 100 mg TID    insulin aspart U-100 pen 0-5 Units QID (AC + HS) PRN    labetalol injection 20 mg Q4H PRN    levETIRAcetam tablet 500 mg Q8H    NIFEdipine 24 hr tablet 90 mg QHS    ondansetron disintegrating tablet 8 mg Q8H PRN    polyethylene glycol packet 17 g Daily    prochlorperazine injection Soln 5 mg Q6H PRN    sodium bicarbonate tablet 1,300 mg BID    sodium chloride 0.9% flush 5 mL PRN     Family History     Problem Relation (Age of Onset)    Bell's palsy Sister    Blindness Maternal Grandmother    Cancer Mother (55)    Diabetes Mother,     Heart attack Mother    Heart disease Mother    Hypertension Mother, Sister    Lupus Sister    No Known Problems Daughter, Son    Sleep apnea Sister    Stroke Sister, Maternal Aunt        Social History Main Topics    Smoking status: Never Smoker    Smokeless tobacco: Never Used    Alcohol use No      Comment: occasional wine cooler     Drug use: No    Sexual activity: Yes     Partners: Male     Review of Systems   Constitutional: Negative for chills, fatigue and fever.   Respiratory: Negative for chest tightness and shortness of breath.    Cardiovascular: Negative for chest pain and leg swelling.   Gastrointestinal: Negative for abdominal distention, abdominal pain, diarrhea and nausea.   Genitourinary: Negative for decreased urine volume, difficulty urinating, flank pain, frequency, hematuria and urgency.   Skin: Negative for rash.   Neurological: Negative for tremors, speech difficulty and weakness.     Objective:     Vital Signs (Most Recent):  Temp: 98.3 °F (36.8 °C) (08/10/18 1138)  Pulse: 61 (08/10/18 1138)  Resp: 18 (08/10/18 1138)  BP: 130/60 (08/10/18 1138)  SpO2: 99 % (08/10/18 1138)  O2 Device (Oxygen Therapy): nasal cannula (08/10/18 1138) Vital Signs (24h Range):  Temp:  [98 °F (36.7 °C)-98.9 °F (37.2 °C)] 98.3 °F (36.8 °C)  Pulse:  [61-75] 61  Resp:  [15-26] 18  SpO2:  [92 %-100 %] 99 %  BP: ()/() 130/60      Weight: 90.7 kg (200 lb) (08/09/18 1844)  Body mass index is 35.43 kg/m².  Body surface area is 2.01 meters squared.    I/O last 3 completed shifts:  In: 500 [IV Piggyback:500]  Out: -     Physical Exam   HENT:   Head: Atraumatic.   Neck: No JVD present.   Cardiovascular: Normal rate and regular rhythm.    Pulmonary/Chest: Effort normal and breath sounds normal.   Abdominal: Soft. She exhibits no distension.   Musculoskeletal: She exhibits no edema or tenderness.   Neurological:   +asterixis   Skin: Skin is warm.

## 2018-08-10 NOTE — ED NOTES
Pt on stretcher in low locked position, sleeping w/ chest rising and falling. NAD, will continue to monitor.

## 2018-08-10 NOTE — ED PROVIDER NOTES
Encounter Date: 8/9/2018       History     Chief Complaint   Patient presents with    Fatigue     sent for decreased renal fxn; hx aneurysm     Ms Perez is a 56 yo F presenting as advised by her PCP for worsening labs. She also complains of fatigue and 2 episodes of vomiting when taking her BP medications today. This is on a background of CKD IV, chronic anemia, hemorrhagic CVA in 2016 with L hemiparesis. Her renal function has been progressively declining and she has presented for the intent of admission and dialysis.  She reports nausea as well, but denies fevers, pain, SOB, CP, and diarrhea. Family in the room.           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    Cortical cataract of both eyes 7/26/2016    Degeneration of lumbar or lumbosacral intervertebral disc 3/5/2013    S/p MRI L-spine 5/2009     Depression     General anesthetics causing adverse effect in therapeutic use     Hemorrhagic cerebrovascular accident (CVA)     8/2016 s/p Hemicraniotomy at [facility] [place] with Left hemiparesis    Hemorrhagic cerebrovascular accident (CVA) 1/3/2018    History of stroke 6/28/2017    s/p R-MCA stroke with R-putaminal hemorrhagic transformation in 8/2016 and 11/2016 (s/p hemicraniotomy at [facility] [place]) with residual L hemiparesis, on AED s/p CVA      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    Hyperlipidemia     Hypertensive retinopathy of both eyes 7/26/2016    Impingement syndrome of right shoulder 7/2/2014    Left ventricular diastolic dysfunction with preserved  systolic function 12/15/2015    Mixed anxiety and depressive disorder 3/5/2013    Obesity     THERESA (obstructive sleep apnea) 3/5/2013    No Home CPAP 2ndary to cost     Partial symptomatic epilepsy with complex partial seizures, not intractable, without status epilepticus     PEG (percutaneous endoscopic gastrostomy) status 6/28/2017    Renovascular hypertension 3/5/2013    Will resume home medications: amlodipine, labetalol, and hydralazine Will hold Lisinopril in setting of DARLING.   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 without macular edema, without long-term current use of insulin 8/2/2017    Type 2 diabetes mellitus with diabetic polyneuropathy, without long-term current use of insulin 10/22/2015    Type 2 diabetes mellitus with stage 3 chronic kidney disease, without long-term current use of insulin 10/22/2015    Vertebral artery stenosis 3/5/2013    S/p Stenting per Dr Burnett      Past Surgical History:   Procedure Laterality Date    BREAST SURGERY      breast reduction    COLONOSCOPY N/A 8/11/2016    Procedure: COLONOSCOPY;  Surgeon: Jerry Vilchis MD;  Location: [facility] ([address] [place] [address]);  Service: Endoscopy;  Laterality: N/A;  Patient reports difficulty awaking from anesthesia in the past.    HYSTERECTOMY      ROTATOR CUFF REPAIR Right July 9, 2014    right side    Skull surgery      Aneurysm    stent placed      in vertebral artery    TUBAL LIGATION       Family History   Problem Relation Age of Onset    Cancer Mother 55        Breast cancer    Diabetes Mother     Hypertension Mother     Heart disease Mother     Heart attack Mother     Stroke Sister      "Hypertension Sister     Sleep apnea Sister     No Known Problems Daughter     No Known Problems Son     Bell's palsy Sister     Lupus Sister     Blindness Maternal Grandmother     Diabetes Unknown         "My entire family family has diabetes"    Stroke Maternal Aunt     Amblyopia Neg Hx     Cataracts Neg Hx     Glaucoma Neg Hx     Macular degeneration Neg Hx     Retinal detachment Neg Hx     Strabismus Neg Hx      Social History   Substance Use Topics    Smoking status: Never Smoker    Smokeless tobacco: Never Used    Alcohol use No      Comment: occasional wine cooler      Review of Systems   Unable to perform ROS: Mental status change       Physical Exam     Initial Vitals [08/09/18 1844]   BP Pulse Resp Temp SpO2   (!) 195/86 70 20 98.9 °F (37.2 °C) (!) 92 %      MAP       --         Physical Exam    Nursing note and vitals reviewed.  Constitutional:   Patient is fatigued, but alert   HENT:   Head: Normocephalic.   Eyes: EOM are normal.   Cardiovascular: Normal rate and regular rhythm.   Abdominal: Soft. She exhibits distension. There is no tenderness.   Neurological: She is alert.   L sided hemiparesis   Skin: Skin is warm and dry.         ED Course   External Jugular IV  Date/Time: 8/9/2018 9:02 PM  Performed by: KATERYNA LUNA.  Authorized by: KATERYNA LUNA   Location (Ext Jugular): Right.  Area Prepped With: Chlorohexidine.  Catheter Size: 18 ga.  Number of attempts: 1  Comments: For IV access. Patient is confused.        Labs Reviewed   CBC W/ AUTO DIFFERENTIAL - Abnormal; Notable for the following:        Result Value    RBC 3.24 (*)     Hemoglobin 9.9 (*)     Hematocrit 31.9 (*)     MCV 99 (*)     MCHC 31.0 (*)     Lymph # 0.7 (*)     Gran% 85.9 (*)     Lymph% 7.6 (*)     Mono% 3.5 (*)     All other components within normal limits   B-TYPE NATRIURETIC PEPTIDE - Abnormal; Notable for the following:      (*)     All other components within normal limits " "  COMPREHENSIVE METABOLIC PANEL - Abnormal; Notable for the following:     Potassium 5.8 (*)     Chloride 111 (*)     CO2 17 (*)     Glucose 191 (*)     BUN, Bld 96 (*)     Creatinine 3.6 (*)     ALT 9 (*)     eGFR if  15.1 (*)     eGFR if non  13.1 (*)     All other components within normal limits   LACTIC ACID, PLASMA   B-TYPE NATRIURETIC PEPTIDE     EKG Readings: (Independently Interpreted)   Initial Reading: No STEMI. Previous EKG Date: 7/23/2018. Rhythm: Normal Sinus Rhythm. Heart Rate: 70. Ectopy: No Ectopy. Conduction: Normal. ST Segments: Normal ST Segments. T Waves Flipped: V4. Clinical Impression: Normal Sinus Rhythm Other Impression: ? artifact vs flipped t in v4. Otherwise no change since 7/23/2018       Imaging Results          X-Ray Chest AP Portable (Final result)  Result time 08/09/18 22:37:04    Final result by Maycol Rodriguez MD (08/09/18 22:37:04)                 Impression:      No detrimental change identified on this single view when compared with 07/23/2018.      Electronically signed by: Maycol Rodriguez MD  Date:    08/09/2018  Time:    22:37             Narrative:    EXAMINATION:  XR CHEST AP PORTABLE    CLINICAL HISTORY:  Provided history is "  Altered mental status, unspecified".    TECHNIQUE:  One view of the chest.    COMPARISON:  07/23/2018.    FINDINGS:  Cardiac silhouette is stable.  Atherosclerotic calcifications overlie the aortic arch.  No detrimental change in lung aeration.                                 Medical Decision Making:   History:   Old Medical Records: I decided to obtain old medical records.  Initial Assessment:   Patient is 56 yo F with background of CKD IV, hemorrhagic CVA with L hemiparesis, and renovascular HTN presenting for worsening labs, fatigue and 2 episodes of vomiting. She was found to be alert but fatigued at time of examination and was found to be hypertensive, having vomited up her afternoon antihypertensive " medications. She has presented for evaluation of her progressing renal dysfunction and possible dialysis.   Differential Diagnosis:   CKD IV progressing to ESRD requiring dialysis.   Clinical Tests:   Lab Tests: Ordered and Reviewed  Medical Tests: Ordered and Reviewed  ED Management:  10 mg hydralazine IV and 8mg zofran IV              Attending Attestation:   Physician Attestation Statement for Resident:  As the supervising MD   Physician Attestation Statement: I have personally seen and examined this patient.   I agree with the above history. -:   As the supervising MD I agree with the above PE.    As the supervising MD I agree with the above treatment, course, plan, and disposition.   -: Altered mental status, fatigue. ? Worsening renal failure. Will check labs and xr.     Uremia, Refractory htn, Hyperkalemia. Will obs pt.  Suspect acute uremia as cause of fatigue.                        Clinical Impression:   The primary encounter diagnosis was Uremia. Diagnoses of Fatigue, Altered mental status, and Hypertension, unspecified type were also pertinent to this visit.                             Joshua Jarrett MD  08/10/18 0017

## 2018-08-10 NOTE — PROGRESS NOTES
"Summar:  Chart review - pt was admitted to the hospital on 8/9 for uremia. LCSW will make a 2nd attempt for SW assessment upon hospital d/c.    Intervention:  Chart review    Plan for next encounter"  Chart review - make 2nd attempt  "

## 2018-08-10 NOTE — H&P
"Nephrology H&P      Consult Requested By: Stefano Hargrove MD  Reason for Consult: permacath     SUBJECTIVE:     History of Present Illness:  Patient is a 57 y.o. female with T2DM, HTN, CVA with left sided weakness, CKD4 who was admitted for worsening renal failure/uremia. DOMONIQUE consulted for permacath placement for HD initiation.     Facility-Administered Medications Prior to Admission   Medication    onabotulinumtoxina injection 300 Units     PTA Medications   Medication Sig    albuterol (PROVENTIL) 2.5 mg /3 mL (0.083 %) nebulizer solution Take 3 mLs (2.5 mg total) by nebulization every 6 (six) hours as needed for Wheezing. Rescue    aspirin (ECOTRIN) 81 MG EC tablet Take 81 mg by mouth once daily.      atorvastatin (LIPITOR) 40 MG tablet TAKE 1 TABLET ONE TIME DAILY FOR CHOLESTEROL    BD INSULIN PEN NEEDLE UF SHORT 31 gauge x 5/16" Ndle USE TO INJECT NOVOLOG FLEXPEN BEFORE MEALS    blood sugar diagnostic (ACCU-CHEK SMARTVIEW TEST STRIP) Strp 1 strip by Misc.(Non-Drug; Combo Route) route 2 (two) times daily.    carvedilol (COREG) 12.5 MG tablet Pt takes 2 tabs in AM  And 1 tab in PM;Pt is to take 1 extra Coreg if SBP>160 or DBP>90.    ergocalciferol (ERGOCALCIFEROL) 50,000 unit Cap 1 capsule (50,000 Units total) by Per G Tube route every 7 days.    famotidine (PEPCID) 20 MG tablet Take 1 tablet (20 mg total) by mouth once daily.    ferrous sulfate 220 mg (44 mg iron)/5 mL solution 10ml daily for Iron/Give via PEG    folic acid (FOLVITE) 1 MG tablet Take 1 tablet (1 mg total) by mouth once daily.    furosemide (LASIX) 40 MG tablet Take 1 tablet (40 mg total) by mouth once daily.    hydrALAZINE (APRESOLINE) 100 MG tablet Take 1 tablet (100 mg total) by mouth 3 (three) times daily.    levETIRAcetam (KEPPRA) 500 MG Tab Take 1 tablet (500 mg total) by mouth every 8 (eight) hours.    NIFEdipine (ADALAT CC) 90 MG TbSR Take 1 tablet (90 mg total) by mouth once daily.    pregabalin (LYRICA) 25 MG capsule 1 " cap in AM and 2 caps at Bedtime for muscle spasm    traZODone (DESYREL) 50 MG tablet Take 1 tablet (50 mg total) by mouth every evening.    sodium bicarbonate 650 MG tablet Take 2 tablets (1,300 mg total) by mouth 2 (two) times daily.       Review of patient's allergies indicates:   Allergen Reactions    Lisinopril Other (See Comments)     Angioedema      Vicodin [hydrocodone-acetaminophen] Rash        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    Cortical cataract of both eyes 7/26/2016    Degeneration of lumbar or lumbosacral intervertebral disc 3/5/2013    S/p MRI L-spine 5/2009     Depression     General anesthetics causing adverse effect in therapeutic use     Hemorrhagic cerebrovascular accident (CVA)     [place] [facility] [place]    Hemorrhagic cerebrovascular accident (CVA) 1/3/2018    History of stroke 6/28/2017    s/p R-MCA stroke with R-putaminal hemorrhagic transformation in 8/2016 and 11/2016 [place] [facility] [place] residual L hemiparesis, on AED s/p CVA      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    Hyperlipidemia     Hypertensive retinopathy of both eyes 7/26/2016    Impingement syndrome of right shoulder 7/2/2014    Left ventricular diastolic dysfunction with preserved systolic function 12/15/2015    Mixed anxiety and depressive disorder 3/5/2013    Obesity     THERESA (obstructive sleep apnea) 3/5/2013    No Home CPAP 2ndary to cost     Partial symptomatic epilepsy with complex partial seizures, not intractable, without status epilepticus     PEG (percutaneous endoscopic gastrostomy) status 6/28/2017    Renovascular hypertension 3/5/2013    Will resume  "home medications: amlodipine, labetalol, and hydralazine Will hold Lisinopril in setting of DARLING.   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 without macular edema, without long-term current use of insulin 8/2/2017    Type 2 diabetes mellitus with diabetic polyneuropathy, without long-term current use of insulin 10/22/2015    Type 2 diabetes mellitus with stage 3 chronic kidney disease, without long-term current use of insulin 10/22/2015    Vertebral artery stenosis 3/5/2013    S/p Stenting per Dr Burnett      Past Surgical History:   Procedure Laterality Date    BREAST SURGERY      breast reduction    COLONOSCOPY N/A 8/11/2016    Procedure: COLONOSCOPY;  Surgeon: Jerry Vilchis MD;  Location: [facility] ([address] [place] [address]);  Service: Endoscopy;  Laterality: N/A;  Patient reports difficulty awaking from anesthesia in the past.    HYSTERECTOMY      ROTATOR CUFF REPAIR Right July 9, 2014    right side    Skull surgery      Aneurysm    stent placed      in vertebral artery    TUBAL LIGATION       Family History   Problem Relation Age of Onset    Cancer Mother 55        Breast cancer    Diabetes Mother     Hypertension Mother     Heart disease Mother     Heart attack Mother     Stroke Sister     Hypertension Sister     Sleep apnea Sister     No Known Problems Daughter     No Known Problems Son     Bell's palsy Sister     Lupus Sister     Blindness Maternal Grandmother     Diabetes Unknown         "My entire family family has diabetes"    Stroke Maternal Aunt     Amblyopia Neg Hx     Cataracts Neg Hx     Glaucoma Neg Hx     Macular degeneration Neg Hx     Retinal detachment " Neg Hx     Strabismus Neg Hx      Social History   Substance Use Topics    Smoking status: Never Smoker    Smokeless tobacco: Never Used    Alcohol use No      Comment: occasional wine cooler        Review of Systems:  Constitutional: Positive for activity change and fatigue. Negative for fever and unexpected weight change.   Respiratory: Positive for shortness of breath. Negative for cough and wheezing.    Cardiovascular: Negative for chest pain, palpitations and leg swelling.   Gastrointestinal: Positive for nausea and vomiting. Negative for abdominal pain.   Genitourinary: Negative for difficulty urinating and dysuria.   Musculoskeletal: Positive for arthralgias, back pain and gait problem (WC bound).   Neurological: Positive for weakness (L sided, s/p CVA). Negative for seizures, light-headedness and headaches.   Psychiatric/Behavioral: Negative for agitation and confusion.     OBJECTIVE:     Vital Signs (Most Recent)  Temp: 98.3 °F (36.8 °C) (08/10/18 1138)  Pulse: 61 (08/10/18 1138)  Resp: 18 (08/10/18 1138)  BP: 130/60 (08/10/18 1138)  SpO2: 99 % (08/10/18 1138)         Intake/Output Summary (Last 24 hours) at 08/10/18 1259  Last data filed at 08/10/18 0800   Gross per 24 hour   Intake              500 ml   Output                0 ml   Net              500 ml       Physical Exam:  Gen: AAOx3, NAD. Chronically ill. Sleepy.   HEENT: mmm  Neck: no bruit, no JVD  CV: RRR, no m/r  Resp: CTAx2, normal effort  GI: soft, ND, NTTP, +BS  Extr: no LE edema  Neuro: normal reflexes, no focal deficits    Laboratory:  CBC with Diff:     Recent Labs  Lab 08/07/18  1100 08/09/18  2043 08/10/18  0134   WBC 5.01 8.54 7.82   HGB 7.5* 9.9* 9.7*   HCT 25.2* 31.9* 31.3*    217 202   LYMPH 19.0  1.0 7.6*  0.7* 5.6*  0.4*   MONO 6.8  0.3 3.5*  0.3 2.6*  0.2*   EOSINOPHIL 3.8 1.8 0.1       CMP:   Recent Labs  Lab 08/07/18  1100 08/09/18  2207   * 191*   CALCIUM 9.2 9.6   ALBUMIN 3.5 3.9   PROT  --  7.9     140   K 5.8* 5.8*   CO2 19* 17*   * 111*   BUN 99* 96*   CREATININE 3.4* 3.6*   ALKPHOS  --  64   ALT  --  9*   AST  --  15   BILITOT  --  0.5   PHOS 5.0*  --      CrCl cannot be calculated (Unknown ideal weight.).  Corrected Calcium:      Lab Results   Component Value Date    LABPROT 9.4 08/04/2017     Lab Results   Component Value Date    CALCIUM 9.6 08/09/2018    PHOS 5.0 (H) 08/07/2018     Lab Results   Component Value Date    IRON 72 08/07/2018    TIBC 194 (L) 08/07/2018    FERRITIN 387 (H) 08/07/2018       Diagnostic Results:  Labs: Reviewed      Scheduled Meds:   aspirin  81 mg Oral Daily    atorvastatin  40 mg Oral Daily    carvedilol  12.5 mg Oral BID    ferrous sulfate  325 mg Oral Daily    furosemide  40 mg Oral Daily    hydrALAZINE  100 mg Oral TID    levETIRAcetam  500 mg Oral Q8H    NIFEdipine  90 mg Oral QHS    polyethylene glycol  17 g Oral Daily    sodium bicarbonate  1,300 mg Oral BID     Continuous Infusions:          ASSESSMENT/PLAN:     Patient Active Problem List   Diagnosis    Renovascular hypertension    Hyperlipidemia    Mixed anxiety and depressive disorder    THERESA (obstructive sleep apnea)    Stage 4 chronic kidney disease    Sarcoidosis    Left ventricular diastolic dysfunction with preserved systolic function    Pulmonary hypertension    Pulmonary edema    Left spastic hemiparesis    Central pain syndrome    Acute respiratory failure with hypoxia    Acute on chronic diastolic heart failure    Partial symptomatic epilepsy with complex partial seizures, not intractable, without status epilepticus    Aphasia    Constipation    PEG (percutaneous endoscopic gastrostomy) status    History of stroke    Anemia in stage 4 chronic kidney disease    Controlled type 2 diabetes mellitus with stage 4 chronic kidney disease, without long-term current use of insulin    Essential hypertension    New daily persistent headache    Folic acid deficiency    Screening for  colorectal cancer    Hemorrhagic cerebrovascular accident (CVA)    Shortness of breath    Wheezing    Edema of left lower extremity    Pneumonia due to infectious organism    Hypoxia    Vitamin D deficiency    Uremia    Hypertensive urgency    Type 2 diabetes mellitus with hyperglycemia, without long-term current use of insulin    Hyperkalemia       Plan:     Will place tunneled HD catheter for HD initiation.  Consent obtained from patient

## 2018-08-10 NOTE — PROGRESS NOTES
8-    Chart reviewed - Pt currently in hospital will follow-up after D/C next week.  Conchita Carranza RN OPCM

## 2018-08-10 NOTE — HPI
58 yo AAF with diet controlled T2DM, HTN, CVA with left sided weakness, CKD4 (HTN and T2DM). Has been experiencing increasing fatigue, tiredness, nausea, episodes of vomiting.  She has resisted dialysis discussion with her nephrologist, however her labs and CKD has been progressing rapidly over the past year, labs were checked yesterday and note with Bun:Cr of 96:3.6, after discussion with her PCP and nephrologist (Dr Blank) it was decided she be admitted for HD initiation, she unfortunately does not have access.

## 2018-08-10 NOTE — ASSESSMENT & PLAN NOTE
Hypertensive Urgency    - Uncontrolled, multiple doses of IV hydralazine in ED unsuccessful  - patient had multiple episodes of emesis throughout the day, was unable to take all PO medications  - give PO meds now with antiemetics, labetalol PRN  - continue home medications: coreg 12.5 mg bid, lasix 40 mg daily, hydralazine 100 mg TID, nifedipine 90 mg daily

## 2018-08-10 NOTE — SUBJECTIVE & OBJECTIVE
Interval History: Patient had R permacath placement by DOMONIQUE, Pt was seen in HD. She is tolerating HD this afternoon. Pain is controlled with pain meds for now.     Review of Systems   Constitutional: Positive for activity change and fatigue. Negative for fever and unexpected weight change.   Respiratory:  Negative for shortness of breath, cough and wheezing.    Cardiovascular: Negative for chest pain, palpitations and leg swelling.   Gastrointestinal:  Negative for abdominal pain.   Genitourinary: Negative for difficulty urinating and dysuria.   Musculoskeletal: Positive for arthralgias, back pain and gait problem (WC bound).   Neurological: Positive for weakness (L sided, s/p CVA). Negative for seizures, light-headedness and headaches.   Psychiatric/Behavioral: Negative for agitation and confusion.      Objective:     Vital Signs (Most Recent):  Temp: 97.9 °F (36.6 °C) (08/10/18 1753)  Pulse: 66 (08/10/18 1753)  Resp: 17 (08/10/18 1753)  BP: 132/61 (08/10/18 1753)  SpO2: 99 % (08/10/18 1753) Vital Signs (24h Range):  Temp:  [97.9 °F (36.6 °C)-98.9 °F (37.2 °C)] 97.9 °F (36.6 °C)  Pulse:  [61-75] 66  Resp:  [15-26] 17  SpO2:  [92 %-100 %] 99 %  BP: ()/() 132/61     Weight: 90.7 kg (200 lb)  Body mass index is 35.43 kg/m².    Intake/Output Summary (Last 24 hours) at 08/10/18 1758  Last data filed at 08/10/18 1200   Gross per 24 hour   Intake              500 ml   Output                0 ml   Net              500 ml      Physical Exam  Constitutional: She is oriented to person, place, and time. She appears lethargic. She is cooperative. She is easily aroused. No distress. Nasal cannula in place.   Chronically ill, temporal wasting   HENT:   Head: Normocephalic and atraumatic.   Eyes: EOM are normal. Pupils are equal, round, and reactive to light.   Neck: Normal range of motion. Neck supple.   Cardiovascular: Normal rate and regular rhythm.    Murmur heard.  Pulmonary/Chest: Effort normal and breath sounds  normal. No respiratory distress. She has no wheezes.   On 2 L NC, SpO2 96%   Abdominal: Soft. Bowel sounds are normal. She exhibits distension. There is no tenderness.   Musculoskeletal: She exhibits no edema.   Neurological: She is oriented to person, place, and time and easily aroused. She appears lethargic.   Left sided weakness, chronic   Skin: She is not diaphoretic.   Psychiatric: She has a normal mood and affect. Her behavior is normal. Judgment and thought content normal.   Nursing note and vitals reviewed.    Computed MELD-Na score unavailable. Necessary lab results were not found in the last year.  Computed MELD score unavailable. Necessary lab results were not found in the last year.    Significant Labs:  CBC:    Recent Labs  Lab 08/09/18  2043 08/10/18  0134   WBC 8.54 7.82   HGB 9.9* 9.7*   HCT 31.9* 31.3*    202     CMP:    Recent Labs  Lab 08/09/18  2207      K 5.8*   *   CO2 17*   *   BUN 96*   CREATININE 3.6*   CALCIUM 9.6   PROT 7.9   ALBUMIN 3.9   BILITOT 0.5   ALKPHOS 64   AST 15   ALT 9*   ANIONGAP 12   EGFRNONAA 13.1*     PTINR:  No results for input(s): INR in the last 48 hours.    Significant Procedures:   Dobutamine Stress Test with Color Flow: No results found for this or any previous visit.    None

## 2018-08-10 NOTE — PLAN OF CARE
Problem: Patient Care Overview  Goal: Plan of Care Review  Outcome: Ongoing (interventions implemented as appropriate)  Pt admitted to room 923A from ED. Pt A&Ox4, non-ambulatory. L side weakness present. Pt's BP upon arrival to unit 80/39. Pt asymptomatic. RN notified MD, who then put in order for 500 mL bolus. Bolus currently running. Will re-check BP once bolus is complete. Pt SR w/ HR 70s on tele. Pt's  at bedside. Both were oriented to room and to unit. Will continue to monitor.

## 2018-08-10 NOTE — PROGRESS NOTES
Dr Serrato and Dr Cordero notified need for Hemodialysis consent. OK with Dr Cordero for Heparin to lock R IJ Permcath after dialysis.

## 2018-08-10 NOTE — ASSESSMENT & PLAN NOTE
CKD 4  - with nausea, vomiting, fatigue, BUN higher than baseline, overall worsening renal function  - nephrology consulted, DOMONIQUE placed R permacath for HD, first HD session done 8/10/2018  - awaiting further nepro recs  - outpatient HD lab ordered. SW has been alerted for HD chair set up

## 2018-08-11 PROBLEM — N18.6 ESRD (END STAGE RENAL DISEASE): Status: ACTIVE | Noted: 2018-08-11

## 2018-08-11 LAB
ALBUMIN SERPL BCP-MCNC: 3.1 G/DL
ANION GAP SERPL CALC-SCNC: 11 MMOL/L
BASOPHILS # BLD AUTO: 0.01 K/UL
BASOPHILS NFR BLD: 0.2 %
BUN SERPL-MCNC: 72 MG/DL
CALCIUM SERPL-MCNC: 8.7 MG/DL
CHLORIDE SERPL-SCNC: 107 MMOL/L
CO2 SERPL-SCNC: 22 MMOL/L
CREAT SERPL-MCNC: 3.2 MG/DL
DIFFERENTIAL METHOD: ABNORMAL
EOSINOPHIL # BLD AUTO: 0.2 K/UL
EOSINOPHIL NFR BLD: 4.8 %
ERYTHROCYTE [DISTWIDTH] IN BLOOD BY AUTOMATED COUNT: 12.8 %
EST. GFR  (AFRICAN AMERICAN): 17.5 ML/MIN/1.73 M^2
EST. GFR  (NON AFRICAN AMERICAN): 15.1 ML/MIN/1.73 M^2
GLUCOSE SERPL-MCNC: 130 MG/DL
HCT VFR BLD AUTO: 23.7 %
HGB BLD-MCNC: 7.3 G/DL
IMM GRANULOCYTES # BLD AUTO: 0.01 K/UL
IMM GRANULOCYTES NFR BLD AUTO: 0.2 %
LYMPHOCYTES # BLD AUTO: 1.2 K/UL
LYMPHOCYTES NFR BLD: 26.2 %
MCH RBC QN AUTO: 30.7 PG
MCHC RBC AUTO-ENTMCNC: 30.8 G/DL
MCV RBC AUTO: 100 FL
MONOCYTES # BLD AUTO: 0.5 K/UL
MONOCYTES NFR BLD: 10.3 %
NEUTROPHILS # BLD AUTO: 2.7 K/UL
NEUTROPHILS NFR BLD: 58.3 %
NRBC BLD-RTO: 0 /100 WBC
PHOSPHATE SERPL-MCNC: 5.5 MG/DL
PLATELET # BLD AUTO: 121 K/UL
PLATELET BLD QL SMEAR: ABNORMAL
PMV BLD AUTO: 11.7 FL
POCT GLUCOSE: 145 MG/DL (ref 70–110)
POCT GLUCOSE: 156 MG/DL (ref 70–110)
POCT GLUCOSE: 168 MG/DL (ref 70–110)
POTASSIUM SERPL-SCNC: 4.9 MMOL/L
RBC # BLD AUTO: 2.38 M/UL
SODIUM SERPL-SCNC: 140 MMOL/L
WBC # BLD AUTO: 4.55 K/UL

## 2018-08-11 PROCEDURE — 25000003 PHARM REV CODE 250: Performed by: PHYSICIAN ASSISTANT

## 2018-08-11 PROCEDURE — 86706 HEP B SURFACE ANTIBODY: CPT

## 2018-08-11 PROCEDURE — 63600175 PHARM REV CODE 636 W HCPCS: Performed by: INTERNAL MEDICINE

## 2018-08-11 PROCEDURE — 90935 HEMODIALYSIS ONE EVALUATION: CPT | Mod: ,,, | Performed by: INTERNAL MEDICINE

## 2018-08-11 PROCEDURE — 80069 RENAL FUNCTION PANEL: CPT

## 2018-08-11 PROCEDURE — 85025 COMPLETE CBC W/AUTO DIFF WBC: CPT

## 2018-08-11 PROCEDURE — 25000003 PHARM REV CODE 250: Performed by: HOSPITALIST

## 2018-08-11 PROCEDURE — 25000003 PHARM REV CODE 250: Performed by: INTERNAL MEDICINE

## 2018-08-11 PROCEDURE — 86704 HEP B CORE ANTIBODY TOTAL: CPT

## 2018-08-11 PROCEDURE — 86709 HEPATITIS A IGM ANTIBODY: CPT

## 2018-08-11 PROCEDURE — 11000001 HC ACUTE MED/SURG PRIVATE ROOM

## 2018-08-11 PROCEDURE — 90935 HEMODIALYSIS ONE EVALUATION: CPT

## 2018-08-11 PROCEDURE — 99232 SBSQ HOSP IP/OBS MODERATE 35: CPT | Mod: ,,, | Performed by: HOSPITALIST

## 2018-08-11 PROCEDURE — 87340 HEPATITIS B SURFACE AG IA: CPT

## 2018-08-11 RX ORDER — HYDRALAZINE HYDROCHLORIDE 50 MG/1
50 TABLET, FILM COATED ORAL 3 TIMES DAILY
Status: DISCONTINUED | OUTPATIENT
Start: 2018-08-11 | End: 2018-08-15

## 2018-08-11 RX ORDER — SODIUM CHLORIDE 9 MG/ML
INJECTION, SOLUTION INTRAVENOUS
Status: DISCONTINUED | OUTPATIENT
Start: 2018-08-11 | End: 2018-08-18 | Stop reason: HOSPADM

## 2018-08-11 RX ORDER — SODIUM CHLORIDE 9 MG/ML
INJECTION, SOLUTION INTRAVENOUS ONCE
Status: COMPLETED | OUTPATIENT
Start: 2018-08-11 | End: 2018-08-11

## 2018-08-11 RX ADMIN — HYDRALAZINE HYDROCHLORIDE 50 MG: 50 TABLET ORAL at 09:08

## 2018-08-11 RX ADMIN — CARVEDILOL 12.5 MG: 12.5 TABLET, FILM COATED ORAL at 09:08

## 2018-08-11 RX ADMIN — SODIUM CHLORIDE: 0.9 INJECTION, SOLUTION INTRAVENOUS at 11:08

## 2018-08-11 RX ADMIN — HEPARIN SODIUM 1000 UNITS: 1000 INJECTION, SOLUTION INTRAVENOUS; SUBCUTANEOUS at 11:08

## 2018-08-11 RX ADMIN — SODIUM BICARBONATE 650 MG TABLET 1300 MG: at 09:08

## 2018-08-11 RX ADMIN — LEVETIRACETAM 500 MG: 500 TABLET ORAL at 05:08

## 2018-08-11 RX ADMIN — LEVETIRACETAM 500 MG: 500 TABLET ORAL at 09:08

## 2018-08-11 RX ADMIN — NIFEDIPINE 90 MG: 30 TABLET, FILM COATED, EXTENDED RELEASE ORAL at 09:08

## 2018-08-11 RX ADMIN — HYDRALAZINE HYDROCHLORIDE 50 MG: 50 TABLET ORAL at 03:08

## 2018-08-11 RX ADMIN — LEVETIRACETAM 500 MG: 500 TABLET ORAL at 03:08

## 2018-08-11 RX ADMIN — ACETAMINOPHEN 650 MG: 325 TABLET, FILM COATED ORAL at 05:08

## 2018-08-11 NOTE — PROGRESS NOTES
Pt is back to the floor from dialysis on her bed with transporter . Pt is A,A,O x 3 . Stable V/s. Family at bedside .

## 2018-08-11 NOTE — PLAN OF CARE
Problem: Patient Care Overview  Goal: Plan of Care Review  Outcome: Ongoing (interventions implemented as appropriate)  Pt AAOx4 and achieving adequate pain relief from prescribed meds. Frequent rounds made to assess for safety and discomfort, fall precautions maintained. Bed locked in lowest position. Call bell within reach. See corresponding flowsheets for full documentation. Will continue to monitor.

## 2018-08-11 NOTE — SUBJECTIVE & OBJECTIVE
Interval History:   She is tolerating HD this AM. Pain is controlled with pain meds for now.     Review of Systems     Constitutional: Positive for activity change and fatigue. Negative for fever and unexpected weight change.   Respiratory:  Negative for shortness of breath, cough and wheezing.    Cardiovascular: Negative for chest pain, palpitations and leg swelling.   Gastrointestinal:  Negative for abdominal pain.   Genitourinary: Negative for difficulty urinating and dysuria.   Musculoskeletal: Positive for arthralgias, back pain and gait problem (WC bound).   Neurological: Positive for weakness (L sided, s/p CVA). Negative for seizures, light-headedness and headaches.   Psychiatric/Behavioral: Negative for agitation and confusion.      Objective:     Vital Signs (Most Recent):  Temp: 98.2 °F (36.8 °C) (08/11/18 1209)  Pulse: 83 (08/11/18 1500)  Resp: 17 (08/11/18 1209)  BP: 132/62 (08/11/18 1209)  SpO2: 96 % (08/11/18 1209) Vital Signs (24h Range):  Temp:  [97.4 °F (36.3 °C)-98.2 °F (36.8 °C)] 98.2 °F (36.8 °C)  Pulse:  [56-83] 83  Resp:  [14-20] 17  SpO2:  [96 %-99 %] 96 %  BP: (102-140)/(43-68) 132/62     Weight: 90.7 kg (200 lb)  Body mass index is 35.43 kg/m².    Intake/Output Summary (Last 24 hours) at 08/11/18 1524  Last data filed at 08/11/18 1100   Gross per 24 hour   Intake                0 ml   Output             2550 ml   Net            -2550 ml      Physical Exam    Constitutional: She is oriented to person, place, and time. She appears lethargic. She is cooperative. She is easily aroused. No distress. Nasal cannula in place.   Chronically ill, temporal wasting   HENT:   Head: Normocephalic and atraumatic.   Eyes: EOM are normal. Pupils are equal, round, and reactive to light.   Neck: Normal range of motion. Neck supple.   Cardiovascular: Normal rate and regular rhythm.    Murmur heard.  Pulmonary/Chest: Effort normal and breath sounds normal. No respiratory distress. She has no wheezes.   On 2 L  NC, SpO2 96%   Abdominal: Soft. Bowel sounds are normal. She exhibits distension. There is no tenderness.   Musculoskeletal: She exhibits no edema.   Neurological: She is oriented to person, place, and time and easily aroused. She appears lethargic.   Left sided weakness, chronic   Skin: She is not diaphoretic.   Psychiatric: She has a normal mood and affect. Her behavior is normal. Judgment and thought content normal.   Nursing note and vitals reviewed.    Computed MELD-Na score unavailable. Necessary lab results were not found in the last year.  Computed MELD score unavailable. Necessary lab results were not found in the last year.    Significant Labs:  CBC:    Recent Labs  Lab 08/09/18  2043 08/10/18  0134 08/11/18  0441   WBC 8.54 7.82 4.55   HGB 9.9* 9.7* 7.3*   HCT 31.9* 31.3* 23.7*    202 121*     CMP:    Recent Labs  Lab 08/09/18  2207 08/11/18  0441    140   K 5.8* 4.9   * 107   CO2 17* 22*   * 130*   BUN 96* 72*   CREATININE 3.6* 3.2*   CALCIUM 9.6 8.7   PROT 7.9  --    ALBUMIN 3.9 3.1*   BILITOT 0.5  --    ALKPHOS 64  --    AST 15  --    ALT 9*  --    ANIONGAP 12 11   EGFRNONAA 13.1* 15.1*     PTINR:  No results for input(s): INR in the last 48 hours.    Significant Procedures:   Dobutamine Stress Test with Color Flow: No results found for this or any previous visit.    None

## 2018-08-11 NOTE — PROGRESS NOTES
CN nurse received report pt arrived on floor via own bed. AAO to self and occasionally situation. O2@2L per NC 98%. Right subclavian permcath dressing dry and intact both lumens flushes well with good blood return. HD started goal to remove 1L in 2.5hr BP permitting

## 2018-08-11 NOTE — PROGRESS NOTES
[facility] Hospital Medicine  Progress Note    Patient Name: Lucy Perez  MRN: [id number]  Patient Class: IP- Inpatient   Admission Date: 8/9/2018  Length of Stay: 1 days  Attending Physician: Stefano Hargrove MD  Primary Care Provider: Beverly Muniz MD    [facility] Medicine Team: [facility]OSP MED A Stefano Hargrove MD    Subjective:     Principal Problem:Uremia    HPI:  Lucy Perez is a 57F with diet controlled T2DM, HTN, CVA with left sided weakness, CKD4. She presents today with her  at the request of her nephrologist and PCP for initiation of HD. Today her home health RN noticed her with SOB, increased fatigue, emesis. The patient's PCP was notified and after discussion with nephrology it was recommended she present to the ED for initiation of dialysis. Patient's  provides most of the history, they are reluctant to start HD, but understand the need. Denies cough, fever, chills, dysuria, abdominal pain, eating well as of yesterday, but has not eaten today. She uses oxygen at home PRN -  states she uses it when her potassium is high and she is SOB.    Intake work up with worsening renal function and rise in BUN from baseline to 96, CXR clear, K+ 5.8 with t-wave changes on EKG.    Hospital Course:  No notes on file    Interval History:   She is tolerating HD this AM. Pain is controlled with pain meds for now.     Review of Systems     Constitutional: Positive for activity change and fatigue. Negative for fever and unexpected weight change.   Respiratory:  Negative for shortness of breath, cough and wheezing.    Cardiovascular: Negative for chest pain, palpitations and leg swelling.   Gastrointestinal:  Negative for abdominal pain.   Genitourinary: Negative for difficulty urinating and dysuria.   Musculoskeletal: Positive for arthralgias, back pain and gait problem (WC bound).   Neurological: Positive for weakness (L sided, s/p CVA). Negative for  seizures, light-headedness and headaches.   Psychiatric/Behavioral: Negative for agitation and confusion.      Objective:     Vital Signs (Most Recent):  Temp: 98.2 °F (36.8 °C) (08/11/18 1209)  Pulse: 83 (08/11/18 1500)  Resp: 17 (08/11/18 1209)  BP: 132/62 (08/11/18 1209)  SpO2: 96 % (08/11/18 1209) Vital Signs (24h Range):  Temp:  [97.4 °F (36.3 °C)-98.2 °F (36.8 °C)] 98.2 °F (36.8 °C)  Pulse:  [56-83] 83  Resp:  [14-20] 17  SpO2:  [96 %-99 %] 96 %  BP: (102-140)/(43-68) 132/62     Weight: 90.7 kg (200 lb)  Body mass index is 35.43 kg/m².    Intake/Output Summary (Last 24 hours) at 08/11/18 1524  Last data filed at 08/11/18 1100   Gross per 24 hour   Intake                0 ml   Output             2550 ml   Net            -2550 ml      Physical Exam    Constitutional: She is oriented to person, place, and time. She appears lethargic. She is cooperative. She is easily aroused. No distress. Nasal cannula in place.   Chronically ill, temporal wasting   HENT:   Head: Normocephalic and atraumatic.   Eyes: EOM are normal. Pupils are equal, round, and reactive to light.   Neck: Normal range of motion. Neck supple.   Cardiovascular: Normal rate and regular rhythm.    Murmur heard.  Pulmonary/Chest: Effort normal and breath sounds normal. No respiratory distress. She has no wheezes.   On 2 L NC, SpO2 96%   Abdominal: Soft. Bowel sounds are normal. She exhibits distension. There is no tenderness.   Musculoskeletal: She exhibits no edema.   Neurological: She is oriented to person, place, and time and easily aroused. She appears lethargic.   Left sided weakness, chronic   Skin: She is not diaphoretic.   Psychiatric: She has a normal mood and affect. Her behavior is normal. Judgment and thought content normal.   Nursing note and vitals reviewed.    Computed MELD-Na score unavailable. Necessary lab results were not found in the last year.  Computed MELD score unavailable. Necessary lab results were not found in the last  year.    Significant Labs:  CBC:    Recent Labs  Lab 08/09/18  2043 08/10/18  0134 08/11/18  0441   WBC 8.54 7.82 4.55   HGB 9.9* 9.7* 7.3*   HCT 31.9* 31.3* 23.7*    202 121*     CMP:    Recent Labs  Lab 08/09/18  2207 08/11/18  0441    140   K 5.8* 4.9   * 107   CO2 17* 22*   * 130*   BUN 96* 72*   CREATININE 3.6* 3.2*   CALCIUM 9.6 8.7   PROT 7.9  --    ALBUMIN 3.9 3.1*   BILITOT 0.5  --    ALKPHOS 64  --    AST 15  --    ALT 9*  --    ANIONGAP 12 11   EGFRNONAA 13.1* 15.1*     PTINR:  No results for input(s): INR in the last 48 hours.    Significant Procedures:   Dobutamine Stress Test with Color Flow: No results found for this or any previous visit.    None    Assessment/Plan:      * Uremia    ESRD  - with nausea, vomiting, fatigue, BUN higher than baseline, overall worsening renal function  - nephrology consulted, DOMONIQUE placed R permacath for HD, first HD session done 8/10/2018  - awaiting further nepro recs  - outpatient HD lab ordered. SW has been alerted for HD chair set up  - family had questions about home HD, discussed case with Nephrology fellow, at this time will need outpatient HD first then eventually can be transfer to home HD by her outpatient nephrologist if able at that itme        Fatigue    - given uremia   - CTM post HD        Hyperkalemia    - 5.8 on admit with peaked T waves on EKG  - give johanny gluconate now, shift with kayexalate and insulin  - maintain on tele, follow AM labs        Type 2 diabetes mellitus with hyperglycemia, without long-term current use of insulin    - diet controlled, diabetic diet, low dose SSI  - A1c 5.8 in 04/2018, recheck in AM        Essential hypertension    Hypertensive Urgency  - Uncontrolled, multiple doses of IV hydralazine in ED unsuccessful  - patient had multiple episodes of emesis throughout the day, was unable to take all PO medications  - give PO meds now with antiemetics, labetalol PRN  - continue home medications: coreg 12.5 mg  bid, lasix 40 mg daily, nifedipine 90 mg daily  - decrease hydralazine to 50mg TID  - given HD initiation, will monitor can D/C or decrease med if needed        Anemia in stage 4 chronic kidney disease    - chronic and stable,   - EPO per nephrology  - on iron supplementation        History of stroke    - left sided weakness, turn q2h, fall risk, aspiration risk  - continue ASA, statin, keppra          VTE Risk Mitigation         Ordered     heparin (porcine) injection 1,000 Units  As needed (PRN)      08/10/18 1505     IP VTE HIGH RISK PATIENT  Once      08/10/18 0039     IP VTE HIGH RISK PATIENT  Once      08/10/18 0039     Place LUIS hose  Until discontinued      08/10/18 0039     Place sequential compression device  Until discontinued      08/10/18 0039              Stefano Hargrove MD  Department of Hospital Medicine   [facility]

## 2018-08-11 NOTE — PLAN OF CARE
Problem: Patient Care Overview  Goal: Plan of Care Review  Outcome: Ongoing (interventions implemented as appropriate)   08/10/18 2005   Coping/Psychosocial   Plan Of Care Reviewed With patient;spouse   Pt is alert and arouse to voice , pt sleepy during the day . Stable V/S. Stable BG during the day . Pt turned in bed Q 2 hrs . And incontinence care done as needed . Pt went to cath lab today and dialysis catheter placed in the Rt chest wall , dialysis done today . Pt is free of falls and injuries .

## 2018-08-11 NOTE — ASSESSMENT & PLAN NOTE
Hypertensive Urgency  - Uncontrolled, multiple doses of IV hydralazine in ED unsuccessful  - patient had multiple episodes of emesis throughout the day, was unable to take all PO medications  - give PO meds now with antiemetics, labetalol PRN  - continue home medications: coreg 12.5 mg bid, lasix 40 mg daily, nifedipine 90 mg daily  - decrease hydralazine to 50mg TID  - given HD initiation, will monitor can D/C or decrease med if needed

## 2018-08-12 LAB
ALBUMIN SERPL BCP-MCNC: 3.1 G/DL
ANION GAP SERPL CALC-SCNC: 10 MMOL/L
BASOPHILS # BLD AUTO: 0.02 K/UL
BASOPHILS NFR BLD: 0.3 %
BUN SERPL-MCNC: 52 MG/DL
CALCIUM SERPL-MCNC: 8.7 MG/DL
CHLORIDE SERPL-SCNC: 107 MMOL/L
CO2 SERPL-SCNC: 25 MMOL/L
CREAT SERPL-MCNC: 2.5 MG/DL
DIFFERENTIAL METHOD: ABNORMAL
EOSINOPHIL # BLD AUTO: 0.3 K/UL
EOSINOPHIL NFR BLD: 4.5 %
ERYTHROCYTE [DISTWIDTH] IN BLOOD BY AUTOMATED COUNT: 12.6 %
EST. GFR  (AFRICAN AMERICAN): 23.5 ML/MIN/1.73 M^2
EST. GFR  (NON AFRICAN AMERICAN): 20.4 ML/MIN/1.73 M^2
GLUCOSE SERPL-MCNC: 209 MG/DL
HCT VFR BLD AUTO: 23.2 %
HGB BLD-MCNC: 7.2 G/DL
IMM GRANULOCYTES # BLD AUTO: 0.01 K/UL
IMM GRANULOCYTES NFR BLD AUTO: 0.2 %
LYMPHOCYTES # BLD AUTO: 0.6 K/UL
LYMPHOCYTES NFR BLD: 9.2 %
MCH RBC QN AUTO: 30.6 PG
MCHC RBC AUTO-ENTMCNC: 31 G/DL
MCV RBC AUTO: 99 FL
MONOCYTES # BLD AUTO: 0.7 K/UL
MONOCYTES NFR BLD: 10.1 %
NEUTROPHILS # BLD AUTO: 5 K/UL
NEUTROPHILS NFR BLD: 75.7 %
NRBC BLD-RTO: 0 /100 WBC
PHOSPHATE SERPL-MCNC: 4.5 MG/DL
PLATELET # BLD AUTO: 133 K/UL
PMV BLD AUTO: 11.3 FL
POCT GLUCOSE: 162 MG/DL (ref 70–110)
POCT GLUCOSE: 167 MG/DL (ref 70–110)
POCT GLUCOSE: 171 MG/DL (ref 70–110)
POCT GLUCOSE: 174 MG/DL (ref 70–110)
POCT GLUCOSE: 198 MG/DL (ref 70–110)
POCT GLUCOSE: 225 MG/DL (ref 70–110)
POTASSIUM SERPL-SCNC: 4.1 MMOL/L
RBC # BLD AUTO: 2.35 M/UL
SODIUM SERPL-SCNC: 142 MMOL/L
WBC # BLD AUTO: 6.61 K/UL

## 2018-08-12 PROCEDURE — 85025 COMPLETE CBC W/AUTO DIFF WBC: CPT

## 2018-08-12 PROCEDURE — 99232 SBSQ HOSP IP/OBS MODERATE 35: CPT | Mod: ,,, | Performed by: HOSPITALIST

## 2018-08-12 PROCEDURE — 80069 RENAL FUNCTION PANEL: CPT

## 2018-08-12 PROCEDURE — 25000242 PHARM REV CODE 250 ALT 637 W/ HCPCS: Performed by: PHYSICIAN ASSISTANT

## 2018-08-12 PROCEDURE — 25000003 PHARM REV CODE 250: Performed by: HOSPITALIST

## 2018-08-12 PROCEDURE — 94761 N-INVAS EAR/PLS OXIMETRY MLT: CPT

## 2018-08-12 PROCEDURE — 25000003 PHARM REV CODE 250: Performed by: PHYSICIAN ASSISTANT

## 2018-08-12 PROCEDURE — 36415 COLL VENOUS BLD VENIPUNCTURE: CPT

## 2018-08-12 PROCEDURE — 27000221 HC OXYGEN, UP TO 24 HOURS

## 2018-08-12 PROCEDURE — 11000001 HC ACUTE MED/SURG PRIVATE ROOM

## 2018-08-12 RX ORDER — CARVEDILOL 25 MG/1
25 TABLET ORAL 2 TIMES DAILY
Status: DISCONTINUED | OUTPATIENT
Start: 2018-08-12 | End: 2018-08-18 | Stop reason: HOSPADM

## 2018-08-12 RX ADMIN — CARVEDILOL 12.5 MG: 12.5 TABLET, FILM COATED ORAL at 08:08

## 2018-08-12 RX ADMIN — HYDRALAZINE HYDROCHLORIDE 50 MG: 50 TABLET ORAL at 08:08

## 2018-08-12 RX ADMIN — HYDRALAZINE HYDROCHLORIDE 50 MG: 50 TABLET ORAL at 10:08

## 2018-08-12 RX ADMIN — CARVEDILOL 25 MG: 25 TABLET, FILM COATED ORAL at 10:08

## 2018-08-12 RX ADMIN — ASPIRIN 81 MG: 81 TABLET, COATED ORAL at 08:08

## 2018-08-12 RX ADMIN — FERROUS SULFATE TAB EC 325 MG (65 MG FE EQUIVALENT) 325 MG: 325 (65 FE) TABLET DELAYED RESPONSE at 08:08

## 2018-08-12 RX ADMIN — POLYETHYLENE GLYCOL 3350 17 G: 17 POWDER, FOR SOLUTION ORAL at 08:08

## 2018-08-12 RX ADMIN — SODIUM BICARBONATE 650 MG TABLET 1300 MG: at 08:08

## 2018-08-12 RX ADMIN — LEVETIRACETAM 500 MG: 500 TABLET ORAL at 04:08

## 2018-08-12 RX ADMIN — IPRATROPIUM BROMIDE AND ALBUTEROL SULFATE 3 ML: .5; 3 SOLUTION RESPIRATORY (INHALATION) at 05:08

## 2018-08-12 RX ADMIN — SODIUM BICARBONATE 650 MG TABLET 1300 MG: at 10:08

## 2018-08-12 RX ADMIN — LEVETIRACETAM 500 MG: 500 TABLET ORAL at 10:08

## 2018-08-12 RX ADMIN — ATORVASTATIN CALCIUM 40 MG: 20 TABLET, FILM COATED ORAL at 08:08

## 2018-08-12 RX ADMIN — LEVETIRACETAM 500 MG: 500 TABLET ORAL at 05:08

## 2018-08-12 RX ADMIN — HYDRALAZINE HYDROCHLORIDE 50 MG: 50 TABLET ORAL at 04:08

## 2018-08-12 RX ADMIN — FUROSEMIDE 40 MG: 40 TABLET ORAL at 08:08

## 2018-08-12 RX ADMIN — NIFEDIPINE 90 MG: 30 TABLET, FILM COATED, EXTENDED RELEASE ORAL at 10:08

## 2018-08-12 NOTE — ASSESSMENT & PLAN NOTE
Hypertensive Urgency  - Uncontrolled, multiple doses of IV hydralazine in ED unsuccessful  - patient had multiple episodes of emesis throughout the day, was unable to take all PO medications  - give PO meds now with antiemetics, labetalol PRN  - continue home medications: coreg 25 mg bid (increased), lasix 40 mg daily, nifedipine 90 mg daily  - decrease hydralazine to 50mg TID  - given HD initiation, will monitor can D/C or decrease med if needed

## 2018-08-12 NOTE — ASSESSMENT & PLAN NOTE
ESRD  - with nausea, vomiting, fatigue, BUN higher than baseline, overall worsening renal function  - nephrology consulted, DOMONIQUE placed R permacath for HD, first HD session done 8/10/2018  - awaiting further nepro recs  - outpatient HD lab ordered. SW has been alerted for HD chair set up  - family had questions about home HD, discussed case with Nephrology fellow, at this time will need outpatient HD first then eventually can be transfer to home HD by her outpatient nephrologist if able at that itme

## 2018-08-12 NOTE — SUBJECTIVE & OBJECTIVE
Interval History:   Pain is controlled with pain meds for now. Doing well    Review of Systems  Constitutional: Positive for activity change and fatigue. Negative for fever and unexpected weight change.   Respiratory:  Negative for shortness of breath, cough and wheezing.    Cardiovascular: Negative for chest pain, palpitations and leg swelling.   Gastrointestinal:  Negative for abdominal pain.   Genitourinary: Negative for difficulty urinating and dysuria.   Musculoskeletal: Positive for arthralgias, back pain and gait problem (WC bound).   Neurological: Positive for weakness (L sided, s/p CVA). Negative for seizures, light-headedness and headaches.   Psychiatric/Behavioral: Negative for agitation and confusion.      Objective:     Vital Signs (Most Recent):  Temp: 97.2 °F (36.2 °C) (08/12/18 1525)  Pulse: 80 (08/12/18 1527)  Resp: 17 (08/12/18 1525)  BP: (!) 167/74 (08/12/18 1525)  SpO2: (!) 93 % (08/12/18 1525) Vital Signs (24h Range):  Temp:  [97.2 °F (36.2 °C)-99.2 °F (37.3 °C)] 97.2 °F (36.2 °C)  Pulse:  [67-92] 80  Resp:  [17-20] 17  SpO2:  [93 %-98 %] 93 %  BP: (128-172)/(60-77) 167/74     Weight: 90.7 kg (200 lb)  Body mass index is 35.43 kg/m².  No intake or output data in the 24 hours ending 08/12/18 1642   Physical Exam    Constitutional: She is oriented to person, place, and time. She appears lethargic. She is cooperative. She is easily aroused. No distress. Nasal cannula in place.   Chronically ill, temporal wasting   HENT:   Head: Normocephalic and atraumatic.   Eyes: EOM are normal. Pupils are equal, round, and reactive to light.   Neck: Normal range of motion. Neck supple.   Cardiovascular: Normal rate and regular rhythm.    Murmur heard.  Pulmonary/Chest: Effort normal and breath sounds normal. No respiratory distress. She has no wheezes.   On 2 L NC, SpO2 96%   Abdominal: Soft. Bowel sounds are normal. She exhibits distension. There is no tenderness.   Musculoskeletal: She exhibits no edema.    Neurological: She is oriented to person, place, and time and easily aroused. She appears lethargic.   Left sided weakness, chronic   Skin: She is not diaphoretic.   Psychiatric: She has a normal mood and affect. Her behavior is normal. Judgment and thought content normal.   Nursing note and vitals reviewed.    Computed MELD-Na score unavailable. Necessary lab results were not found in the last year.  Computed MELD score unavailable. Necessary lab results were not found in the last year.    Significant Labs:  CBC:  Recent Labs   Lab  08/11/18 0441 08/12/18 0439   WBC  4.55  6.61   HGB  7.3*  7.2*   HCT  23.7*  23.2*   PLT  121*  133*     CMP:  Recent Labs   Lab  08/11/18 0441 08/12/18 0439   NA  140  142   K  4.9  4.1   CL  107  107   CO2  22*  25   GLU  130*  209*   BUN  72*  52*   CREATININE  3.2*  2.5*   CALCIUM  8.7  8.7   ALBUMIN  3.1*  3.1*   ANIONGAP  11  10   EGFRNONAA  15.1*  20.4*     PTINR:  No results for input(s): INR in the last 48 hours.    Significant Procedures:   Dobutamine Stress Test with Color Flow: No results found for this or any previous visit.    None

## 2018-08-12 NOTE — ASSESSMENT & PLAN NOTE
ESRD  - with nausea, vomiting, fatigue, BUN higher than baseline, overall worsening renal function  - nephrology consulted, DOMONIQUE placed R permacath for HD, first HD session done 8/10/2018  - awaiting further nepro recs  - outpatient HD lab ordered. SW has been alerted for HD chair set up  - family had questions about home HD, discussed case with Nephrology fellow, at this time will need outpatient HD first then eventually can be transfer to home HD by her outpatient nephrologist if able at that time

## 2018-08-12 NOTE — PROGRESS NOTES
[facility] Hospital Medicine  Progress Note    Patient Name: Lucy Perez  MRN: [id number]  Patient Class: IP- Inpatient   Admission Date: 8/9/2018  Length of Stay: 2 days  Attending Physician: Stefano Hargrove MD  Primary Care Provider: Beverly Muniz MD    [facility] Medicine Team: [facility] Stefano Hargrove MD    Subjective:     Principal Problem:Uremia    HPI:  Lucy Perez is a 57F with diet controlled T2DM, HTN, CVA with left sided weakness, CKD4. She presents today with her  at the request of her nephrologist and PCP for initiation of HD. Today her home health RN noticed her with SOB, increased fatigue, emesis. The patient's PCP was notified and after discussion with nephrology it was recommended she present to the ED for initiation of dialysis. Patient's  provides most of the history, they are reluctant to start HD, but understand the need. Denies cough, fever, chills, dysuria, abdominal pain, eating well as of yesterday, but has not eaten today. She uses oxygen at home PRN -  states she uses it when her potassium is high and she is SOB.    Intake work up with worsening renal function and rise in BUN from baseline to 96, CXR clear, K+ 5.8 with t-wave changes on EKG.    Hospital Course:  No notes on file    Interval History:   Pain is controlled with pain meds for now. Doing well    Review of Systems  Constitutional: Positive for activity change and fatigue. Negative for fever and unexpected weight change.   Respiratory:  Negative for shortness of breath, cough and wheezing.    Cardiovascular: Negative for chest pain, palpitations and leg swelling.   Gastrointestinal:  Negative for abdominal pain.   Genitourinary: Negative for difficulty urinating and dysuria.   Musculoskeletal: Positive for arthralgias, back pain and gait problem (WC bound).   Neurological: Positive for weakness (L sided, s/p CVA). Negative for seizures, light-headedness and  headaches.   Psychiatric/Behavioral: Negative for agitation and confusion.      Objective:     Vital Signs (Most Recent):  Temp: 97.2 °F (36.2 °C) (08/12/18 1525)  Pulse: 80 (08/12/18 1527)  Resp: 17 (08/12/18 1525)  BP: (!) 167/74 (08/12/18 1525)  SpO2: (!) 93 % (08/12/18 1525) Vital Signs (24h Range):  Temp:  [97.2 °F (36.2 °C)-99.2 °F (37.3 °C)] 97.2 °F (36.2 °C)  Pulse:  [67-92] 80  Resp:  [17-20] 17  SpO2:  [93 %-98 %] 93 %  BP: (128-172)/(60-77) 167/74     Weight: 90.7 kg (200 lb)  Body mass index is 35.43 kg/m².  No intake or output data in the 24 hours ending 08/12/18 1642   Physical Exam    Constitutional: She is oriented to person, place, and time. She appears lethargic. She is cooperative. She is easily aroused. No distress. Nasal cannula in place.   Chronically ill, temporal wasting   HENT:   Head: Normocephalic and atraumatic.   Eyes: EOM are normal. Pupils are equal, round, and reactive to light.   Neck: Normal range of motion. Neck supple.   Cardiovascular: Normal rate and regular rhythm.    Murmur heard.  Pulmonary/Chest: Effort normal and breath sounds normal. No respiratory distress. She has no wheezes.   On 2 L NC, SpO2 96%   Abdominal: Soft. Bowel sounds are normal. She exhibits distension. There is no tenderness.   Musculoskeletal: She exhibits no edema.   Neurological: She is oriented to person, place, and time and easily aroused. She appears lethargic.   Left sided weakness, chronic   Skin: She is not diaphoretic.   Psychiatric: She has a normal mood and affect. Her behavior is normal. Judgment and thought content normal.   Nursing note and vitals reviewed.    Computed MELD-Na score unavailable. Necessary lab results were not found in the last year.  Computed MELD score unavailable. Necessary lab results were not found in the last year.    Significant Labs:  CBC:  Recent Labs   Lab  08/11/18   0441  08/12/18   0439   WBC  4.55  6.61   HGB  7.3*  7.2*   HCT  23.7*  23.2*   PLT  121*  133*      CMP:  Recent Labs   Lab  08/11/18   0441  08/12/18   0439   NA  140  142   K  4.9  4.1   CL  107  107   CO2  22*  25   GLU  130*  209*   BUN  72*  52*   CREATININE  3.2*  2.5*   CALCIUM  8.7  8.7   ALBUMIN  3.1*  3.1*   ANIONGAP  11  10   EGFRNONAA  15.1*  20.4*     PTINR:  No results for input(s): INR in the last 48 hours.    Significant Procedures:   Dobutamine Stress Test with Color Flow: No results found for this or any previous visit.    None    Assessment/Plan:      * Uremia    ESRD  - with nausea, vomiting, fatigue, BUN higher than baseline, overall worsening renal function  - nephrology consulted, DOMONIQUE placed R permacath for HD, first HD session done 8/10/2018  - awaiting further nepro recs  - outpatient HD lab ordered. SW has been alerted for HD chair set up  - family had questions about home HD, discussed case with Nephrology fellow, at this time will need outpatient HD first then eventually can be transfer to home HD by her outpatient nephrologist if able at that itme        Fatigue    - given uremia   - CTM post HD        Hyperkalemia    - 5.8 on admit with peaked T waves on EKG  - give johanny gluconate now, shift with kayexalate and insulin  - maintain on tele, follow AM labs        Type 2 diabetes mellitus with hyperglycemia, without long-term current use of insulin    - diet controlled, diabetic diet, low dose SSI  - A1c 5.8 in 04/2018, recheck in AM        Essential hypertension    Hypertensive Urgency  - Uncontrolled, multiple doses of IV hydralazine in ED unsuccessful  - patient had multiple episodes of emesis throughout the day, was unable to take all PO medications  - give PO meds now with antiemetics, labetalol PRN  - continue home medications: coreg 25 mg bid (increased), lasix 40 mg daily, nifedipine 90 mg daily  - decrease hydralazine to 50mg TID  - given HD initiation, will monitor can D/C or decrease med if needed        Anemia in stage 4 chronic kidney disease    - chronic and stable,   -  EPO per nephrology  - on iron supplementation        History of stroke    - left sided weakness, turn q2h, fall risk, aspiration risk  - continue ASA, statin, keppra          VTE Risk Mitigation (From admission, onward)        Ordered     heparin (porcine) injection 1,000 Units  As needed (PRN)      08/10/18 1505     IP VTE HIGH RISK PATIENT  Once      08/10/18 0039     IP VTE HIGH RISK PATIENT  Once      08/10/18 0039     Place LUIS hose  Until discontinued      08/10/18 0039     Place sequential compression device  Until discontinued      08/10/18 0039              Stefano Hargrove MD  Department of Hospital Medicine   [facility]

## 2018-08-13 LAB
ABO + RH BLD: NORMAL
ALBUMIN SERPL BCP-MCNC: 2.9 G/DL
ANION GAP SERPL CALC-SCNC: 11 MMOL/L
BASOPHILS # BLD AUTO: 0.01 K/UL
BASOPHILS # BLD AUTO: 0.01 K/UL
BASOPHILS NFR BLD: 0.2 %
BASOPHILS NFR BLD: 0.2 %
BLD GP AB SCN CELLS X3 SERPL QL: NORMAL
BUN SERPL-MCNC: 58 MG/DL
CALCIUM SERPL-MCNC: 8.3 MG/DL
CHLORIDE SERPL-SCNC: 107 MMOL/L
CO2 SERPL-SCNC: 24 MMOL/L
CREAT SERPL-MCNC: 2.6 MG/DL
DIFFERENTIAL METHOD: ABNORMAL
DIFFERENTIAL METHOD: ABNORMAL
EOSINOPHIL # BLD AUTO: 0.2 K/UL
EOSINOPHIL # BLD AUTO: 0.2 K/UL
EOSINOPHIL NFR BLD: 4.4 %
EOSINOPHIL NFR BLD: 4.6 %
ERYTHROCYTE [DISTWIDTH] IN BLOOD BY AUTOMATED COUNT: 12.2 %
ERYTHROCYTE [DISTWIDTH] IN BLOOD BY AUTOMATED COUNT: 12.3 %
EST. GFR  (AFRICAN AMERICAN): 22.4 ML/MIN/1.73 M^2
EST. GFR  (NON AFRICAN AMERICAN): 19.5 ML/MIN/1.73 M^2
GLUCOSE SERPL-MCNC: 165 MG/DL
HAV IGM SERPL QL IA: NEGATIVE
HBV CORE AB SERPL QL IA: NEGATIVE
HBV SURFACE AB SER-ACNC: POSITIVE M[IU]/ML
HBV SURFACE AG SERPL QL IA: NEGATIVE
HCT VFR BLD AUTO: 20.8 %
HCT VFR BLD AUTO: 22.3 %
HGB BLD-MCNC: 6.4 G/DL
HGB BLD-MCNC: 6.9 G/DL
IMM GRANULOCYTES # BLD AUTO: 0.02 K/UL
IMM GRANULOCYTES # BLD AUTO: 0.02 K/UL
IMM GRANULOCYTES NFR BLD AUTO: 0.4 %
IMM GRANULOCYTES NFR BLD AUTO: 0.4 %
LYMPHOCYTES # BLD AUTO: 0.7 K/UL
LYMPHOCYTES # BLD AUTO: 0.8 K/UL
LYMPHOCYTES NFR BLD: 14.8 %
LYMPHOCYTES NFR BLD: 16.6 %
MCH RBC QN AUTO: 30.1 PG
MCH RBC QN AUTO: 30.5 PG
MCHC RBC AUTO-ENTMCNC: 30.8 G/DL
MCHC RBC AUTO-ENTMCNC: 30.9 G/DL
MCV RBC AUTO: 97 FL
MCV RBC AUTO: 99 FL
MONOCYTES # BLD AUTO: 0.6 K/UL
MONOCYTES # BLD AUTO: 0.6 K/UL
MONOCYTES NFR BLD: 12.3 %
MONOCYTES NFR BLD: 12.8 %
NEUTROPHILS # BLD AUTO: 3.2 K/UL
NEUTROPHILS # BLD AUTO: 3.3 K/UL
NEUTROPHILS NFR BLD: 65.4 %
NEUTROPHILS NFR BLD: 67.9 %
NRBC BLD-RTO: 0 /100 WBC
NRBC BLD-RTO: 0 /100 WBC
PHOSPHATE SERPL-MCNC: 4.9 MG/DL
PLATELET # BLD AUTO: 115 K/UL
PLATELET # BLD AUTO: 115 K/UL
PMV BLD AUTO: 10.6 FL
PMV BLD AUTO: 11.5 FL
POCT GLUCOSE: 116 MG/DL (ref 70–110)
POCT GLUCOSE: 178 MG/DL (ref 70–110)
POCT GLUCOSE: 180 MG/DL (ref 70–110)
POCT GLUCOSE: 189 MG/DL (ref 70–110)
POCT GLUCOSE: 245 MG/DL (ref 70–110)
POTASSIUM SERPL-SCNC: 4.1 MMOL/L
RBC # BLD AUTO: 2.1 M/UL
RBC # BLD AUTO: 2.29 M/UL
SODIUM SERPL-SCNC: 142 MMOL/L
WBC # BLD AUTO: 4.79 K/UL
WBC # BLD AUTO: 4.83 K/UL

## 2018-08-13 PROCEDURE — 90935 HEMODIALYSIS ONE EVALUATION: CPT

## 2018-08-13 PROCEDURE — 86850 RBC ANTIBODY SCREEN: CPT

## 2018-08-13 PROCEDURE — 96372 THER/PROPH/DIAG INJ SC/IM: CPT

## 2018-08-13 PROCEDURE — 63600175 PHARM REV CODE 636 W HCPCS: Performed by: INTERNAL MEDICINE

## 2018-08-13 PROCEDURE — 85025 COMPLETE CBC W/AUTO DIFF WBC: CPT | Mod: 91

## 2018-08-13 PROCEDURE — 36415 COLL VENOUS BLD VENIPUNCTURE: CPT

## 2018-08-13 PROCEDURE — 25000003 PHARM REV CODE 250: Performed by: HOSPITALIST

## 2018-08-13 PROCEDURE — 90935 HEMODIALYSIS ONE EVALUATION: CPT | Mod: ,,, | Performed by: INTERNAL MEDICINE

## 2018-08-13 PROCEDURE — 25000003 PHARM REV CODE 250: Performed by: PHYSICIAN ASSISTANT

## 2018-08-13 PROCEDURE — 25000003 PHARM REV CODE 250: Performed by: INTERNAL MEDICINE

## 2018-08-13 PROCEDURE — 99232 SBSQ HOSP IP/OBS MODERATE 35: CPT | Mod: ,,, | Performed by: HOSPITALIST

## 2018-08-13 PROCEDURE — 80069 RENAL FUNCTION PANEL: CPT

## 2018-08-13 PROCEDURE — 86920 COMPATIBILITY TEST SPIN: CPT

## 2018-08-13 PROCEDURE — 11000001 HC ACUTE MED/SURG PRIVATE ROOM

## 2018-08-13 RX ORDER — HYDRALAZINE HYDROCHLORIDE 50 MG/1
50 TABLET, FILM COATED ORAL 3 TIMES DAILY
Qty: 90 TABLET | Refills: 11 | Status: SHIPPED | OUTPATIENT
Start: 2018-08-13 | End: 2018-08-17 | Stop reason: HOSPADM

## 2018-08-13 RX ORDER — CARVEDILOL 25 MG/1
25 TABLET ORAL 2 TIMES DAILY
Qty: 60 TABLET | Refills: 11 | Status: SHIPPED | OUTPATIENT
Start: 2018-08-13 | End: 2018-09-05 | Stop reason: SDUPTHER

## 2018-08-13 RX ORDER — ACETAMINOPHEN 325 MG/1
650 TABLET ORAL EVERY 6 HOURS PRN
Refills: 0 | COMMUNITY
Start: 2018-08-13

## 2018-08-13 RX ORDER — SODIUM CHLORIDE 9 MG/ML
INJECTION, SOLUTION INTRAVENOUS
Status: DISCONTINUED | OUTPATIENT
Start: 2018-08-13 | End: 2018-08-18 | Stop reason: HOSPADM

## 2018-08-13 RX ORDER — SODIUM CHLORIDE 9 MG/ML
INJECTION, SOLUTION INTRAVENOUS ONCE
Status: COMPLETED | OUTPATIENT
Start: 2018-08-13 | End: 2018-08-13

## 2018-08-13 RX ORDER — HYDROCODONE BITARTRATE AND ACETAMINOPHEN 500; 5 MG/1; MG/1
TABLET ORAL
Status: DISCONTINUED | OUTPATIENT
Start: 2018-08-13 | End: 2018-08-18 | Stop reason: HOSPADM

## 2018-08-13 RX ADMIN — POLYETHYLENE GLYCOL 3350 17 G: 17 POWDER, FOR SOLUTION ORAL at 08:08

## 2018-08-13 RX ADMIN — SODIUM BICARBONATE 650 MG TABLET 1300 MG: at 10:08

## 2018-08-13 RX ADMIN — HYDRALAZINE HYDROCHLORIDE 50 MG: 50 TABLET ORAL at 10:08

## 2018-08-13 RX ADMIN — SODIUM BICARBONATE 650 MG TABLET 1300 MG: at 08:08

## 2018-08-13 RX ADMIN — FERROUS SULFATE TAB EC 325 MG (65 MG FE EQUIVALENT) 325 MG: 325 (65 FE) TABLET DELAYED RESPONSE at 08:08

## 2018-08-13 RX ADMIN — LEVETIRACETAM 500 MG: 500 TABLET ORAL at 05:08

## 2018-08-13 RX ADMIN — SODIUM CHLORIDE 300 ML: 0.9 INJECTION, SOLUTION INTRAVENOUS at 01:08

## 2018-08-13 RX ADMIN — CARVEDILOL 25 MG: 25 TABLET, FILM COATED ORAL at 10:08

## 2018-08-13 RX ADMIN — ASPIRIN 81 MG: 81 TABLET, COATED ORAL at 08:08

## 2018-08-13 RX ADMIN — CARVEDILOL 25 MG: 25 TABLET, FILM COATED ORAL at 08:08

## 2018-08-13 RX ADMIN — LEVETIRACETAM 500 MG: 500 TABLET ORAL at 10:08

## 2018-08-13 RX ADMIN — HEPARIN SODIUM 1000 UNITS: 1000 INJECTION, SOLUTION INTRAVENOUS; SUBCUTANEOUS at 04:08

## 2018-08-13 RX ADMIN — ATORVASTATIN CALCIUM 40 MG: 20 TABLET, FILM COATED ORAL at 08:08

## 2018-08-13 RX ADMIN — FUROSEMIDE 40 MG: 40 TABLET ORAL at 08:08

## 2018-08-13 RX ADMIN — HYDRALAZINE HYDROCHLORIDE 50 MG: 50 TABLET ORAL at 08:08

## 2018-08-13 RX ADMIN — LEVETIRACETAM 500 MG: 500 TABLET ORAL at 06:08

## 2018-08-13 RX ADMIN — ERYTHROPOIETIN 10000 UNITS: 10000 INJECTION, SOLUTION INTRAVENOUS; SUBCUTANEOUS at 04:08

## 2018-08-13 RX ADMIN — ACETAMINOPHEN 650 MG: 325 TABLET, FILM COATED ORAL at 04:08

## 2018-08-13 RX ADMIN — HYDRALAZINE HYDROCHLORIDE 50 MG: 50 TABLET ORAL at 05:08

## 2018-08-13 RX ADMIN — NIFEDIPINE 90 MG: 30 TABLET, FILM COATED, EXTENDED RELEASE ORAL at 10:08

## 2018-08-13 NOTE — ASSESSMENT & PLAN NOTE
Significant drop over the weekend (9/7 --> 6.4) and iron studies from 8/7 indicate patient is not iron deficient, could have lost some blood during perm cath placement, but very unlikely it would be a drop to this extent, also possibly Hgb > 9 on admit and a day after not accurate, there is no report of other bleeding either, she has been started on epo, but recommend w/u for other cause / reason for drop in Hgb, discussed with primary team and will defer need for transfusion to this service

## 2018-08-13 NOTE — SUBJECTIVE & OBJECTIVE
Interval History:   Pain is controlled with pain meds for now. Doing well. Setting up HD chair    Review of Systems  Constitutional: Positive for activity change and fatigue. Negative for fever and unexpected weight change.   Respiratory:  Negative for shortness of breath, cough and wheezing.    Cardiovascular: Negative for chest pain, palpitations and leg swelling.   Gastrointestinal:  Negative for abdominal pain.   Genitourinary: Negative for difficulty urinating and dysuria.   Musculoskeletal: Positive for arthralgias, back pain and gait problem (WC bound).   Neurological: Positive for weakness (L sided, s/p CVA). Negative for seizures, light-headedness and headaches.   Psychiatric/Behavioral: Negative for agitation and confusion.      Objective:     Vital Signs (Most Recent):  Temp: 97.9 °F (36.6 °C) (08/13/18 0810)  Pulse: 67 (08/13/18 0810)  Resp: 20 (08/13/18 0810)  BP: 133/61 (08/13/18 0810)  SpO2: 96 % (08/13/18 0810) Vital Signs (24h Range):  Temp:  [97.2 °F (36.2 °C)-98.5 °F (36.9 °C)] 97.9 °F (36.6 °C)  Pulse:  [62-82] 67  Resp:  [17-20] 20  SpO2:  [93 %-100 %] 96 %  BP: (128-194)/(60-91) 133/61     Weight: 90.7 kg (200 lb)  Body mass index is 35.43 kg/m².  No intake or output data in the 24 hours ending 08/13/18 0857      Physical Exam  Constitutional: She is oriented to person, place, and time. She appears lethargic. She is cooperative. She is easily aroused. No distress. Nasal cannula in place. Chronically ill, temporal wasting   Head: Normocephalic and atraumatic.   Eyes: EOM are normal. Pupils are equal, round, and reactive to light.   Neck: Normal range of motion. Neck supple.   Cardiovascular: Normal rate and regular rhythm.  Murmur heard.  Pulmonary/Chest: Effort normal and breath sounds normal. No respiratory distress. She has no wheezes.   On 2 L NC, SpO2 96%   Abdominal: Soft. Bowel sounds are normal. She exhibits distension. There is no tenderness.   Musculoskeletal: She exhibits no edema.    Neurological: She is oriented to person, place, and time and easily aroused. Left sided weakness, chronic   Skin: She is not diaphoretic.   Psychiatric: She has a normal mood and affect. Her behavior is normal. Judgment and thought content normal.   Nursing note and vitals reviewed.    Computed MELD-Na score unavailable. Necessary lab results were not found in the last year.  Computed MELD score unavailable. Necessary lab results were not found in the last year.    Significant Labs:  CBC:  Recent Labs   Lab  08/12/18   0439   WBC  6.61   HGB  7.2*   HCT  23.2*   PLT  133*     CMP:  Recent Labs   Lab  08/12/18 0439  08/13/18   0511   NA  142  142   K  4.1  4.1   CL  107  107   CO2  25  24   GLU  209*  165*   BUN  52*  58*   CREATININE  2.5*  2.6*   CALCIUM  8.7  8.3*   ALBUMIN  3.1*  2.9*   ANIONGAP  10  11   EGFRNONAA  20.4*  19.5*     PTINR:  No results for input(s): INR in the last 48 hours.    Significant Procedures:   Dobutamine Stress Test with Color Flow: No results found for this or any previous visit.    None

## 2018-08-13 NOTE — SUBJECTIVE & OBJECTIVE
Interval History: second HD session this past Saturday and tolerated well    Review of patient's allergies indicates:   Allergen Reactions    Lisinopril Other (See Comments)     Angioedema      Vicodin [hydrocodone-acetaminophen] Rash     Current Facility-Administered Medications   Medication Frequency    0.9%  NaCl infusion PRN    0.9%  NaCl infusion Once    0.9%  NaCl infusion PRN    0.9%  NaCl infusion PRN    0.9%  NaCl infusion Once    acetaminophen tablet 650 mg Q4H PRN    acetaminophen tablet 650 mg Q8H PRN    albuterol-ipratropium 2.5 mg-0.5 mg/3 mL nebulizer solution 3 mL Q4H PRN    aspirin EC tablet 81 mg Daily    atorvastatin tablet 40 mg Daily    bisacodyl suppository 10 mg Daily PRN    calcium gluconate 1 g in dextrose 5 % 100 mL IVPB Q10 Min PRN    carvedilol tablet 25 mg BID    dextrose 50% injection 12.5 g PRN    dextrose 50% injection 25 g PRN    ferrous sulfate EC tablet 325 mg Daily    furosemide tablet 40 mg Daily    glucagon (human recombinant) injection 1 mg PRN    glucose chewable tablet 16 g PRN    glucose chewable tablet 24 g PRN    heparin (porcine) injection 1,000 Units PRN    hydrALAZINE tablet 50 mg TID    insulin aspart U-100 pen 0-5 Units QID (AC + HS) PRN    labetalol injection 20 mg Q4H PRN    levETIRAcetam tablet 500 mg Q8H    NIFEdipine 24 hr tablet 90 mg QHS    ondansetron disintegrating tablet 8 mg Q8H PRN    polyethylene glycol packet 17 g Daily    prochlorperazine injection Soln 5 mg Q6H PRN    sodium bicarbonate tablet 1,300 mg BID    sodium chloride 0.9% flush 5 mL PRN    tuberculin injection 5 Units Once       Objective:     Vital Signs (Most Recent):  Temp: 98.1 °F (36.7 °C) (08/13/18 1245)  Pulse: (P) 60 (08/13/18 1530)  Resp: 18 (08/13/18 1345)  BP: (P) 125/68 (08/13/18 1530)  SpO2: 98 % (08/13/18 1245)  O2 Device (Oxygen Therapy): nasal cannula (08/13/18 1345) Vital Signs (24h Range):  Temp:  [97.6 °F (36.4 °C)-98.5 °F (36.9 °C)] 98.1 °F  (36.7 °C)  Pulse:  [54-82] (P) 60  Resp:  [18-20] 18  SpO2:  [95 %-100 %] 98 %  BP: (116-194)/(55-91) (P) 125/68     Weight: 91.4 kg (201 lb 8 oz) (08/13/18 0845)  Body mass index is 35.69 kg/m².  Body surface area is 2.02 meters squared.    No intake/output data recorded.    Physical Exam   HENT:   Head: Atraumatic.   Neck: No JVD present.   Cardiovascular: Normal rate and regular rhythm.   Pulmonary/Chest: Effort normal. She has rales (mild at bases).   Abdominal: Soft. She exhibits no distension.   Musculoskeletal: She exhibits edema (mostly ankle). She exhibits no tenderness.   Neurological: She is alert.   L sided weakness and some neglect noted   Skin: Skin is warm.

## 2018-08-13 NOTE — PLAN OF CARE
Problem: Pressure Ulcer Risk (Roosevelt Scale) (Adult,Obstetrics,Pediatric)  Goal: Identify Related Risk Factors and Signs and Symptoms  Related risk factors and signs and symptoms are identified upon initiation of Human Response Clinical Practice Guideline (CPG)   Outcome: Ongoing (interventions implemented as appropriate)   08/13/18 1135   Pressure Ulcer Risk (Roosevelt Scale)   Related Risk Factors (Pressure Ulcer Risk (Roosevelt Scale)) body weight extremes;cognitive impairment;mobility impaired;tissue perfusion altered

## 2018-08-13 NOTE — PROGRESS NOTES
Pt arrived to unit via bed. Pt alert & stable. Acute pt. Accessed RT IJ, lumens patent, duration 3.0hrs, Qb 400ml/min, Qd 800ml/min, planned UFG 2.0L, orders reviewed.

## 2018-08-13 NOTE — PROGRESS NOTES
[facility]  Nephrology  Progress Note    Patient Name: Lucy Perez  MRN: [id number]  Admission Date: 8/9/2018  Hospital Length of Stay: 3 days  Attending Provider: Stefano Hargrove MD   Primary Care Physician: Beverly Muniz MD  Principal Problem:Uremia    Subjective:     HPI: 56 yo AAF with diet controlled T2DM, HTN, CVA with left sided weakness, CKD4 (HTN and T2DM). Has been experiencing increasing fatigue, tiredness, nausea, episodes of vomiting.  She has resisted dialysis discussion with her nephrologist, however her labs and CKD has been progressing rapidly over the past year, labs were checked yesterday and note with Bun:Cr of 96:3.6, after discussion with her PCP and nephrologist (Dr Blank) it was decided she be admitted for HD initiation, she unfortunately does not have access.       Interval History: second HD session this past Saturday and tolerated well    Review of patient's allergies indicates:   Allergen Reactions    Lisinopril Other (See Comments)     Angioedema      Vicodin [hydrocodone-acetaminophen] Rash     Current Facility-Administered Medications   Medication Frequency    0.9%  NaCl infusion PRN    0.9%  NaCl infusion Once    0.9%  NaCl infusion PRN    0.9%  NaCl infusion PRN    0.9%  NaCl infusion Once    acetaminophen tablet 650 mg Q4H PRN    acetaminophen tablet 650 mg Q8H PRN    albuterol-ipratropium 2.5 mg-0.5 mg/3 mL nebulizer solution 3 mL Q4H PRN    aspirin EC tablet 81 mg Daily    atorvastatin tablet 40 mg Daily    bisacodyl suppository 10 mg Daily PRN    calcium gluconate 1 g in dextrose 5 % 100 mL IVPB Q10 Min PRN    carvedilol tablet 25 mg BID    dextrose 50% injection 12.5 g PRN    dextrose 50% injection 25 g PRN    ferrous sulfate EC tablet 325 mg Daily    furosemide tablet 40 mg Daily    glucagon (human recombinant) injection 1 mg PRN    glucose chewable tablet 16 g PRN    glucose chewable tablet 24 g PRN    heparin (porcine)  injection 1,000 Units PRN    hydrALAZINE tablet 50 mg TID    insulin aspart U-100 pen 0-5 Units QID (AC + HS) PRN    labetalol injection 20 mg Q4H PRN    levETIRAcetam tablet 500 mg Q8H    NIFEdipine 24 hr tablet 90 mg QHS    ondansetron disintegrating tablet 8 mg Q8H PRN    polyethylene glycol packet 17 g Daily    prochlorperazine injection Soln 5 mg Q6H PRN    sodium bicarbonate tablet 1,300 mg BID    sodium chloride 0.9% flush 5 mL PRN    tuberculin injection 5 Units Once       Objective:     Vital Signs (Most Recent):  Temp: 98.1 °F (36.7 °C) (08/13/18 1245)  Pulse: (P) 60 (08/13/18 1530)  Resp: 18 (08/13/18 1345)  BP: (P) 125/68 (08/13/18 1530)  SpO2: 98 % (08/13/18 1245)  O2 Device (Oxygen Therapy): nasal cannula (08/13/18 1345) Vital Signs (24h Range):  Temp:  [97.6 °F (36.4 °C)-98.5 °F (36.9 °C)] 98.1 °F (36.7 °C)  Pulse:  [54-82] (P) 60  Resp:  [18-20] 18  SpO2:  [95 %-100 %] 98 %  BP: (116-194)/(55-91) (P) 125/68     Weight: 91.4 kg (201 lb 8 oz) (08/13/18 0845)  Body mass index is 35.69 kg/m².  Body surface area is 2.02 meters squared.    No intake/output data recorded.    Physical Exam   HENT:   Head: Atraumatic.   Neck: No JVD present.   Cardiovascular: Normal rate and regular rhythm.   Pulmonary/Chest: Effort normal. She has rales (mild at bases).   Abdominal: Soft. She exhibits no distension.   Musculoskeletal: She exhibits edema (mostly ankle). She exhibits no tenderness.   Neurological: She is alert.   L sided weakness and some neglect noted   Skin: Skin is warm.         Assessment/Plan:     Hyperkalemia    -no reported EKG changes, managed on HD        Hypertensive urgency    -remaining controlled, management reviewed        Anemia in stage 4 chronic kidney disease    Significant drop over the weekend (9/7 --> 6.4) and iron studies from 8/7 indicate patient is not iron deficient, could have lost some blood during perm cath placement, but very unlikely it would be a drop to this extent,  also possibly Hgb > 9 on admit and a day after not accurate, there is no report of other bleeding either, she has been started on epo, but recommend w/u for other cause / reason for drop in Hgb, discussed with primary team and will defer need for transfusion to this service         Stage 4 chronic kidney disease    Now progressing towards stage V, etiology HTN/DM, now with worsening electrolytes and uremic symptoms (lethargy, nausea, vomiting and asterixis on exam), admitted for tentative RRT initiation.    Plan/Recommendations:  1) third HD intiation this afternoon (3hrs, F160, , , UF 2-3)  2) will need placement in dialysis unit prior to discharge            Thank you for your consult. I will follow-up with patient. Please contact us if you have any additional questions.    Dallas Cordero MD  Nephrology  [facility]    ATTENDING PHYSICIAN ATTESTATION  I have personally interviewed and examined the patient. I thoroughly reviewed the demographic, clinical, laboratorial and imaging information available in medical records. I agree with the assessment and recommendations provided by the subspecialty resident. Dr. Cordero was under my supervision.    HEMODIALYSIS NOTE  Patient evaluated while undergoing hemodialysis indicated for ESRD. Tolerating session with current UFR, no complications.

## 2018-08-13 NOTE — PROGRESS NOTES
Patient BP elevated to the 190s. On call Md notified and ordered to retake BP at midnight. Also reported that patient was somnolent, ordered to continue to monitor patient. No acute distress at this time. Will continue to monitor.

## 2018-08-13 NOTE — PROGRESS NOTES
IHD completed, blood returned. Pt alert & stable.Heparin dwell instilled dual lumens 1.6cc per port. Duration 3.0hrs, QB 400ml/min, Qd 800ml/min, UFG 2.0L. Report called. Pt transported to [facility] via bed.

## 2018-08-13 NOTE — PLAN OF CARE
Per DR Hargrove, he ordered HD order lab set.  HD chair/ hh is the plan. ([facility]  )   WANDY Aleman notified via email     08/13/18 0947   Right Care Assessment   Can the patient answer the patient profile reliably? Yes, cognitively intact   How often would a person be available to care for the patient? Whenever needed   How does the patient rate their overall health at the present time? Fair   Number of comorbid conditions (as recorded on the chart) Five or more   During the past month, has the patient often been bothered by feeling down, depressed or hopeless? No   During the past month, has the patient often been bothered by little interest or pleasure in doing things? No

## 2018-08-13 NOTE — PLAN OF CARE
Problem: Fall Risk (Adult)  Goal: Identify Related Risk Factors and Signs and Symptoms  Related risk factors and signs and symptoms are identified upon initiation of Human Response Clinical Practice Guideline (CPG)   Outcome: Ongoing (interventions implemented as appropriate)   08/13/18 1134   Fall Risk   Related Risk Factors (Fall Risk) age-related changes;fatigue/slow reaction;fear of falling;history of falls;inadequate lighting;slipper/uneven surfaces;environment unfamiliar;sleep pattern alteration   Signs and Symptoms (Fall Risk) presence of risk factors       Problem: Patient Care Overview  Goal: Plan of Care Review  Outcome: Ongoing (interventions implemented as appropriate)   08/12/18 2215   Coping/Psychosocial   Plan Of Care Reviewed With patient;spouse       Problem: Infection, Risk/Actual (Adult)  Goal: Identify Related Risk Factors and Signs and Symptoms  Related risk factors and signs and symptoms are identified upon initiation of Human Response Clinical Practice Guideline (CPG)   Outcome: Ongoing (interventions implemented as appropriate)   08/13/18 1134   Infection, Risk/Actual   Related Risk Factors (Infection, Risk/Actual) chronic illness/condition;prolonged hospitalization;medication effects   Signs and Symptoms (Infection, Risk/Actual) blood glucose changes;pain

## 2018-08-13 NOTE — NURSING
To dialysis via hospital bed with transport in stable condition.  Report given to April in dialysis.

## 2018-08-13 NOTE — ASSESSMENT & PLAN NOTE
Now progressing towards stage V, etiology HTN/DM, now with worsening electrolytes and uremic symptoms (lethargy, nausea, vomiting and asterixis on exam), admitted for tentative RRT initiation.    Plan/Recommendations:  1) third HD intiation this afternoon (3hrs, F160, , , UF 2-3)  2) will need placement in dialysis unit prior to discharge

## 2018-08-13 NOTE — PLAN OF CARE
Problem: Patient Care Overview  Goal: Plan of Care Review  Outcome: Ongoing (interventions implemented as appropriate)  Plan of care reviewed with patient and spouse. VSS throughout shift. Patient denies pain throughout shift. Renal diet, tolerating well, no complaints of N/V. Blood sugar checked q6. Remains free of falls/injuries. No acute distress at this time. Will continue to monitor.

## 2018-08-13 NOTE — PLAN OF CARE
[facility]  HOME HEALTH ORDERS  FACE TO FACE ENCOUNTER    Patient Name: Lucy Perez  YOB: 1960    PCP: Beverly Muniz MD   PCP Address: [address] / [place] [address]  PCP Phone Number: [phone]  PCP Fax: [phone]    Encounter Date: 08/17/2018    Admit to Home Health    Diagnoses:  Active Hospital Problems    Diagnosis  POA    *Uremia [N19]  Yes    ESRD (end stage renal disease) [N18.6]  Yes    Hypertensive urgency [I16.0]  Yes    Type 2 diabetes mellitus with hyperglycemia, without long-term current use of insulin [E11.65]  Yes    Hyperkalemia [E87.5]  Yes    Acute kidney failure [N17.9]  Yes    Fatigue [R53.83]  Yes    Essential hypertension [I10]  Yes    Anemia in stage 4 chronic kidney disease [N18.4, D63.1]  Yes    History of stroke [Z86.73]  Not Applicable     s/p R-MCA stroke with R-putaminal hemorrhagic transformation in 8/2016 and 11/2016 (s/p hemicraniotomy at [facility]) with residual L hemiparesis, on AED s/p CVA        Stage 4 chronic kidney disease [N18.4]  Yes      Resolved Hospital Problems   No resolved problems to display.       Future Appointments   Date Time Provider Department Center   8/31/2018  9:00 AM LAB, [facility] LAB [place]   9/5/2018 11:00 AM Beverly Muniz MD [facility] ODALYS [place]ENT [place]   9/17/2018  9:00 AM LAB, HEMONC SAME DAY [facility] LAB HO [place]   9/17/2018 10:00 AM Mike Owen MD [facility] BM [place]   9/17/2018 10:45 AM INJECTION, [facility] INFUSION [facility] [place]           I have seen and examined this patient face to face today. My clinical findings that support the need for the home health skilled services and home bound status are the following:  Weakness/numbness causing balance and gait disturbance due to Stroke and Weakness/Debility making it taxing to leave home.  Medical restrictions requiring assistance of another human to leave home due to  Stroke.    Allergies:  Review  of patient's allergies indicates:   Allergen Reactions    Lisinopril Other (See Comments)     Angioedema      Vicodin [hydrocodone-acetaminophen] Rash       Diet: renal diet    Activities: activity as tolerated    Nursing:   SN to complete comprehensive assessment including routine vital signs. Instruct on disease process and s/s of complications to report to MD. Review/verify medication list sent home with the patient at time of discharge  and instruct patient/caregiver as needed. Frequency may be adjusted depending on start of care date.    Notify MD if SBP > 160 or < 90; DBP > 90 or < 50; HR > 120 or < 50; Temp > 101         CONSULTS:    Physical Therapy to evaluate and treat. Evaluate for home safety and equipment needs; Establish/upgrade home exercise program. Perform / instruct on therapeutic exercises, gait training, transfer training, and Range of Motion.  Occupational Therapy to evaluate and treat. Evaluate home environment for safety and equipment needs. Perform/Instruct on transfers, ADL training, ROM, and therapeutic exercises.   to evaluate for community resources/long-range planning.  Aide to provide assistance with personal care, ADLs, and vital signs.    MISCELLANEOUS CARE:  Home Oxygen:  Oxygen at 2 L/min nasal canula to be used:  Continuously.    WOUND CARE ORDERS  n/a      Medications: Review discharge medications with patient and family and provide education.      Current Discharge Medication List      START taking these medications    Details   acetaminophen (TYLENOL) 325 MG tablet Take 2 tablets (650 mg total) by mouth every 6 (six) hours as needed for Pain.  Refills: 0         CONTINUE these medications which have CHANGED    Details   carvedilol (COREG) 25 MG tablet Take 1 tablet (25 mg total) by mouth 2 (two) times daily.  Qty: 60 tablet, Refills: 11      hydrALAZINE (APRESOLINE) 50 MG tablet Take 1 tablet (50 mg total) by mouth 3 (three) times daily.  Qty: 90 tablet, Refills:  "11         CONTINUE these medications which have NOT CHANGED    Details   albuterol (PROVENTIL) 2.5 mg /3 mL (0.083 %) nebulizer solution Take 3 mLs (2.5 mg total) by nebulization every 6 (six) hours as needed for Wheezing. Rescue  Qty: 25 each, Refills: 3    Associated Diagnoses: Chest congestion      aspirin (ECOTRIN) 81 MG EC tablet Take 81 mg by mouth once daily.        atorvastatin (LIPITOR) 40 MG tablet TAKE 1 TABLET ONE TIME DAILY FOR CHOLESTEROL  Qty: 90 tablet, Refills: 2    Associated Diagnoses: Pure hypercholesterolemia      BD INSULIN PEN NEEDLE UF SHORT 31 gauge x 5/16" Ndle USE TO INJECT NOVOLOG FLEXPEN BEFORE MEALS  Qty: 150 each, Refills: 11      blood sugar diagnostic (ACCU-CHEK SMARTVIEW TEST STRIP) Strp 1 strip by Misc.(Non-Drug; Combo Route) route 2 (two) times daily.  Qty: 300 each, Refills: 3    Associated Diagnoses: Type 2 diabetes mellitus with chronic kidney disease      ergocalciferol (ERGOCALCIFEROL) 50,000 unit Cap 1 capsule (50,000 Units total) by Per G Tube route every 7 days.  Qty: 12 capsule, Refills: 2      famotidine (PEPCID) 20 MG tablet Take 1 tablet (20 mg total) by mouth once daily.  Qty: 90 tablet, Refills: 2    Associated Diagnoses: Hemorrhagic cerebrovascular accident (CVA)      ferrous sulfate 220 mg (44 mg iron)/5 mL solution 10ml daily for Iron/Give via PEG  Qty: 300 mL, Refills: 6    Associated Diagnoses: Anemia, unspecified type      folic acid (FOLVITE) 1 MG tablet Take 1 tablet (1 mg total) by mouth once daily.  Qty: 90 tablet, Refills: 2    Associated Diagnoses: Folate deficiency      furosemide (LASIX) 40 MG tablet Take 1 tablet (40 mg total) by mouth once daily.  Qty: 90 tablet, Refills: 2      levETIRAcetam (KEPPRA) 500 MG Tab Take 1 tablet (500 mg total) by mouth every 8 (eight) hours.  Qty: 270 tablet, Refills: 2    Associated Diagnoses: Hemorrhagic cerebrovascular accident (CVA)      NIFEdipine (ADALAT CC) 90 MG TbSR Take 1 tablet (90 mg total) by mouth once " daily.  Qty: 90 tablet, Refills: 2      pregabalin (LYRICA) 25 MG capsule 1 cap in AM and 2 caps at Bedtime for muscle spasm  Qty: 270 capsule, Refills: 2    Associated Diagnoses: Muscle spasticity      traZODone (DESYREL) 50 MG tablet Take 1 tablet (50 mg total) by mouth every evening.  Qty: 90 tablet, Refills: 1      sodium bicarbonate 650 MG tablet Take 2 tablets (1,300 mg total) by mouth 2 (two) times daily.  Qty: 360 tablet, Refills: 2             I certify that this patient is confined to her home and needs intermittent skilled nursing care, physical therapy and occupational therapy.        Salud Portillo MD  Hospital Medicine Staff  Department of Hospital Medicine   [facility]  8/17/2018 3:13 PM

## 2018-08-13 NOTE — PROGRESS NOTES
[facility] Hospital Medicine  Progress Note    Patient Name: Lucy Perez  MRN: [id number]  Patient Class: IP- Inpatient   Admission Date: 8/9/2018  Length of Stay: 3 days  Attending Physician: Stefano Hargrove MD  Primary Care Provider: Beverly Muniz MD    [facility] Medicine Team: [facility] HOSP MED A Stefano Hargrove MD    Subjective:     Principal Problem:Uremia    HPI:  Lucy Perez is a 57F with diet controlled T2DM, HTN, CVA with left sided weakness, CKD4. She presents today with her  at the request of her nephrologist and PCP for initiation of HD. Today her home health RN noticed her with SOB, increased fatigue, emesis. The patient's PCP was notified and after discussion with nephrology it was recommended she present to the ED for initiation of dialysis. Patient's  provides most of the history, they are reluctant to start HD, but understand the need. Denies cough, fever, chills, dysuria, abdominal pain, eating well as of yesterday, but has not eaten today. She uses oxygen at home PRN -  states she uses it when her potassium is high and she is SOB.    Intake work up with worsening renal function and rise in BUN from baseline to 96, CXR clear, K+ 5.8 with t-wave changes on EKG.    Hospital Course:  No notes on file    Interval History:   Pain is controlled with pain meds for now. Doing well. Setting up HD chair    Review of Systems  Constitutional: Positive for activity change and fatigue. Negative for fever and unexpected weight change.   Respiratory:  Negative for shortness of breath, cough and wheezing.    Cardiovascular: Negative for chest pain, palpitations and leg swelling.   Gastrointestinal:  Negative for abdominal pain.   Genitourinary: Negative for difficulty urinating and dysuria.   Musculoskeletal: Positive for arthralgias, back pain and gait problem (WC bound).   Neurological: Positive for weakness (L sided, s/p CVA). Negative for seizures,  light-headedness and headaches.   Psychiatric/Behavioral: Negative for agitation and confusion.      Objective:     Vital Signs (Most Recent):  Temp: 97.9 °F (36.6 °C) (08/13/18 0810)  Pulse: 67 (08/13/18 0810)  Resp: 20 (08/13/18 0810)  BP: 133/61 (08/13/18 0810)  SpO2: 96 % (08/13/18 0810) Vital Signs (24h Range):  Temp:  [97.2 °F (36.2 °C)-98.5 °F (36.9 °C)] 97.9 °F (36.6 °C)  Pulse:  [62-82] 67  Resp:  [17-20] 20  SpO2:  [93 %-100 %] 96 %  BP: (128-194)/(60-91) 133/61     Weight: 90.7 kg (200 lb)  Body mass index is 35.43 kg/m².  No intake or output data in the 24 hours ending 08/13/18 0857      Physical Exam  Constitutional: She is oriented to person, place, and time. She appears lethargic. She is cooperative. She is easily aroused. No distress. Nasal cannula in place. Chronically ill, temporal wasting   Head: Normocephalic and atraumatic.   Eyes: EOM are normal. Pupils are equal, round, and reactive to light.   Neck: Normal range of motion. Neck supple.   Cardiovascular: Normal rate and regular rhythm.  Murmur heard.  Pulmonary/Chest: Effort normal and breath sounds normal. No respiratory distress. She has no wheezes.   On 2 L NC, SpO2 96%   Abdominal: Soft. Bowel sounds are normal. She exhibits distension. There is no tenderness.   Musculoskeletal: She exhibits no edema.   Neurological: She is oriented to person, place, and time and easily aroused. Left sided weakness, chronic   Skin: She is not diaphoretic.   Psychiatric: She has a normal mood and affect. Her behavior is normal. Judgment and thought content normal.   Nursing note and vitals reviewed.    Computed MELD-Na score unavailable. Necessary lab results were not found in the last year.  Computed MELD score unavailable. Necessary lab results were not found in the last year.    Significant Labs:  CBC:  Recent Labs   Lab  08/12/18   0439   WBC  6.61   HGB  7.2*   HCT  23.2*   PLT  133*     CMP:  Recent Labs   Lab  08/12/18   0439  08/13/18   0511   NA   142  142   K  4.1  4.1   CL  107  107   CO2  25  24   GLU  209*  165*   BUN  52*  58*   CREATININE  2.5*  2.6*   CALCIUM  8.7  8.3*   ALBUMIN  3.1*  2.9*   ANIONGAP  10  11   EGFRNONAA  20.4*  19.5*     PTINR:  No results for input(s): INR in the last 48 hours.    Significant Procedures:   Dobutamine Stress Test with Color Flow: No results found for this or any previous visit.    None    Assessment/Plan:      * Uremia    ESRD  - with nausea, vomiting, fatigue, BUN higher than baseline, overall worsening renal function  - nephrology consulted, DOMONIQUE placed R permacath for HD, first HD session done 8/10/2018  - awaiting further nepro recs  - outpatient HD lab ordered. SW has been alerted for HD chair set up  - family had questions about home HD, discussed case with Nephrology fellow, at this time will need outpatient HD first then eventually can be transfer to home HD by her outpatient nephrologist if able at that time        Fatigue    - given uremia   - CTM post HD        Hyperkalemia    - 5.8 on admit with peaked T waves on EKG  - give johanny gluconate now, shift with kayexalate and insulin  - maintain on tele, follow AM labs        Type 2 diabetes mellitus with hyperglycemia, without long-term current use of insulin    - diet controlled, diabetic diet, low dose SSI  - A1c 5.8 in 04/2018, recheck in AM        Essential hypertension    Hypertensive Urgency  - Uncontrolled, multiple doses of IV hydralazine in ED unsuccessful  - patient had multiple episodes of emesis throughout the day, was unable to take all PO medications  - give PO meds now with antiemetics, labetalol PRN  - continue home medications: coreg 25 mg bid (increased), lasix 40 mg daily, nifedipine 90 mg daily  - decrease hydralazine to 50mg TID  - given HD initiation, will monitor can D/C or decrease med if needed        Anemia in stage 4 chronic kidney disease    - chronic and stable,   - EPO per nephrology  - on iron supplementation        History of  stroke    - left sided weakness, turn q2h, fall risk, aspiration risk  - continue ASA, statin, keppra          VTE Risk Mitigation (From admission, onward)        Ordered     heparin (porcine) injection 1,000 Units  As needed (PRN)      08/10/18 1505     IP VTE HIGH RISK PATIENT  Once      08/10/18 0039     IP VTE HIGH RISK PATIENT  Once      08/10/18 0039     Place LUIS hose  Until discontinued      08/10/18 0039     Place sequential compression device  Until discontinued      08/10/18 0039              Stefano Hargrove MD  Department of Hospital Medicine   [facility]

## 2018-08-13 NOTE — PLAN OF CARE
Covering LANE spoke with pt's spouse Wilder to obtain preference for dialysis location. Spouse states that the [facility] is the closest facility to their home. SW initiated outpatient HD for  [facility]. SW faxed request to [facility] ([phone]). Hep panel and TB test results are still pending.    3:37 PM  Covering LANE spoke with Kevin with [facility]. Kevin states that pt's chair time at [facility] is tentatively scheduled for Monday, Wednesday, Friday at 3:00 PM. Kevin states that other medical records will be needed prior to pt chair time being confirmed. LANE will be informed of what those records are. Kevin states that he will forward the referral to [facility] for review. LANE will continue to follow.    4:02 PM  Covering LANE sent home health referral to [facility].     Love Peña LMSW  [facility]

## 2018-08-14 ENCOUNTER — OUTPATIENT CASE MANAGEMENT (OUTPATIENT)
Dept: ADMINISTRATIVE | Facility: OTHER | Age: 58
End: 2018-08-14

## 2018-08-14 LAB
ALBUMIN SERPL BCP-MCNC: 3.1 G/DL
ANION GAP SERPL CALC-SCNC: 10 MMOL/L
BASOPHILS # BLD AUTO: 0.02 K/UL
BASOPHILS NFR BLD: 0.4 %
BUN SERPL-MCNC: 30 MG/DL
CALCIUM SERPL-MCNC: 8.7 MG/DL
CHLORIDE SERPL-SCNC: 104 MMOL/L
CO2 SERPL-SCNC: 25 MMOL/L
CREAT SERPL-MCNC: 2 MG/DL
DIFFERENTIAL METHOD: ABNORMAL
EOSINOPHIL # BLD AUTO: 0.2 K/UL
EOSINOPHIL NFR BLD: 4.7 %
ERYTHROCYTE [DISTWIDTH] IN BLOOD BY AUTOMATED COUNT: 12.4 %
EST. GFR  (AFRICAN AMERICAN): 30.8 ML/MIN/1.73 M^2
EST. GFR  (NON AFRICAN AMERICAN): 26.7 ML/MIN/1.73 M^2
GLUCOSE SERPL-MCNC: 153 MG/DL
HCT VFR BLD AUTO: 22.9 %
HGB BLD-MCNC: 7 G/DL
IMM GRANULOCYTES # BLD AUTO: 0.01 K/UL
IMM GRANULOCYTES NFR BLD AUTO: 0.2 %
LYMPHOCYTES # BLD AUTO: 0.9 K/UL
LYMPHOCYTES NFR BLD: 18.1 %
MCH RBC QN AUTO: 30.3 PG
MCHC RBC AUTO-ENTMCNC: 30.6 G/DL
MCV RBC AUTO: 99 FL
MONOCYTES # BLD AUTO: 0.7 K/UL
MONOCYTES NFR BLD: 13.8 %
NEUTROPHILS # BLD AUTO: 3 K/UL
NEUTROPHILS NFR BLD: 62.8 %
NRBC BLD-RTO: 0 /100 WBC
PHOSPHATE SERPL-MCNC: 3.1 MG/DL
PLATELET # BLD AUTO: 118 K/UL
PMV BLD AUTO: 10.4 FL
POCT GLUCOSE: 186 MG/DL (ref 70–110)
POCT GLUCOSE: 227 MG/DL (ref 70–110)
POCT GLUCOSE: 239 MG/DL (ref 70–110)
POTASSIUM SERPL-SCNC: 3.9 MMOL/L
RBC # BLD AUTO: 2.31 M/UL
SODIUM SERPL-SCNC: 139 MMOL/L
WBC # BLD AUTO: 4.85 K/UL

## 2018-08-14 PROCEDURE — 99232 SBSQ HOSP IP/OBS MODERATE 35: CPT | Mod: ,,, | Performed by: INTERNAL MEDICINE

## 2018-08-14 PROCEDURE — 25000003 PHARM REV CODE 250: Performed by: HOSPITALIST

## 2018-08-14 PROCEDURE — 80069 RENAL FUNCTION PANEL: CPT

## 2018-08-14 PROCEDURE — 36415 COLL VENOUS BLD VENIPUNCTURE: CPT

## 2018-08-14 PROCEDURE — 11000001 HC ACUTE MED/SURG PRIVATE ROOM

## 2018-08-14 PROCEDURE — 85025 COMPLETE CBC W/AUTO DIFF WBC: CPT

## 2018-08-14 PROCEDURE — 25000003 PHARM REV CODE 250: Performed by: PHYSICIAN ASSISTANT

## 2018-08-14 PROCEDURE — 63600175 PHARM REV CODE 636 W HCPCS: Performed by: PHYSICIAN ASSISTANT

## 2018-08-14 RX ORDER — SODIUM CHLORIDE 9 MG/ML
INJECTION, SOLUTION INTRAVENOUS ONCE
Status: COMPLETED | OUTPATIENT
Start: 2018-08-14 | End: 2018-08-15

## 2018-08-14 RX ORDER — SODIUM CHLORIDE 9 MG/ML
INJECTION, SOLUTION INTRAVENOUS
Status: DISCONTINUED | OUTPATIENT
Start: 2018-08-14 | End: 2018-08-18 | Stop reason: HOSPADM

## 2018-08-14 RX ADMIN — SODIUM BICARBONATE 650 MG TABLET 1300 MG: at 08:08

## 2018-08-14 RX ADMIN — LEVETIRACETAM 500 MG: 500 TABLET ORAL at 09:08

## 2018-08-14 RX ADMIN — INSULIN ASPART 2 UNITS: 100 INJECTION, SOLUTION INTRAVENOUS; SUBCUTANEOUS at 12:08

## 2018-08-14 RX ADMIN — CARVEDILOL 25 MG: 25 TABLET, FILM COATED ORAL at 08:08

## 2018-08-14 RX ADMIN — LEVETIRACETAM 500 MG: 500 TABLET ORAL at 02:08

## 2018-08-14 RX ADMIN — NIFEDIPINE 90 MG: 30 TABLET, FILM COATED, EXTENDED RELEASE ORAL at 08:08

## 2018-08-14 RX ADMIN — POLYETHYLENE GLYCOL 3350 17 G: 17 POWDER, FOR SOLUTION ORAL at 10:08

## 2018-08-14 RX ADMIN — SODIUM BICARBONATE 650 MG TABLET 1300 MG: at 10:08

## 2018-08-14 RX ADMIN — CARVEDILOL 25 MG: 25 TABLET, FILM COATED ORAL at 10:08

## 2018-08-14 RX ADMIN — HYDRALAZINE HYDROCHLORIDE 50 MG: 50 TABLET ORAL at 08:08

## 2018-08-14 RX ADMIN — ACETAMINOPHEN 650 MG: 325 TABLET, FILM COATED ORAL at 12:08

## 2018-08-14 RX ADMIN — ACETAMINOPHEN 650 MG: 325 TABLET ORAL at 08:08

## 2018-08-14 RX ADMIN — HYDRALAZINE HYDROCHLORIDE 50 MG: 50 TABLET ORAL at 10:08

## 2018-08-14 RX ADMIN — ASPIRIN 81 MG: 81 TABLET, COATED ORAL at 10:08

## 2018-08-14 RX ADMIN — LEVETIRACETAM 500 MG: 500 TABLET ORAL at 05:08

## 2018-08-14 RX ADMIN — FUROSEMIDE 40 MG: 40 TABLET ORAL at 10:08

## 2018-08-14 RX ADMIN — HYDRALAZINE HYDROCHLORIDE 50 MG: 50 TABLET ORAL at 02:08

## 2018-08-14 RX ADMIN — ATORVASTATIN CALCIUM 40 MG: 20 TABLET, FILM COATED ORAL at 10:08

## 2018-08-14 NOTE — NURSING
Pt has #22 gauge peripheral IV to right AC. Pt spouse reports pt very difficult to obtain IV, 22 gauge started with ultrasound. Called Dallas Ibarra NP to inquire about giving blood via #22 gauge IV. Instructed to hold blood until PICC team can see pt for larger bore IV that is more appropriate for blood administration.

## 2018-08-14 NOTE — PROGRESS NOTES
Hospital Medicine   Progress note   Team: [facility]OS[facility] MED A Salud Portillo MD   Admit Date: 8/9/2018   FAITH 8/15/2018   Code status: Full Code   Principal Problem: Uremia     Interval hx:  Blood transfusion overnight.  Hgb improved with transfusion.  Afebrile.  Patient awaiting outpatient HD chair.  Requesting tylenol for back pain.        ROS   Constitutional: Positive for activity change and fatigue. Negative for fever and unexpected weight change.   Respiratory:  Negative for shortness of breath, cough and wheezing.    Cardiovascular: Negative for chest pain, palpitations and leg swelling.   Gastrointestinal:  Negative for abdominal pain.   Genitourinary: Negative for difficulty urinating and dysuria.   Musculoskeletal: Positive for arthralgias, back pain and gait problem (WC bound).   Neurological: Positive for weakness (L sided, s/p CVA). Negative for seizures, light-headedness and headaches.   Psychiatric/Behavioral: Negative for agitation and confusion.            PEx   Temp:  [98 °F (36.7 °C)-99 °F (37.2 °C)]   Pulse:  [54-74]   Resp:  [14-20]   BP: (116-163)/(55-71)   SpO2:  [95 %-99 %]      I & O (Last 24H):     Intake/Output Summary (Last 24 hours) at 8/14/2018 1107  Last data filed at 8/14/2018 0600  Gross per 24 hour   Intake 780 ml   Output 2600 ml   Net -1820 ml       Constitutional: NAD, Appears well-developed and well-nourished. Chronically ill, temporal wasting   Mental status: Alert and oriented to person, place, and time.  Head: Normocephalic and atraumatic.   Mouth/Throat: Oropharynx is clear and moist.   Eyes: EOM are normal. PERRL, No scleral icterus.   Neck: Normal range of motion. Neck supple. +HD cath in right neck  Cardiovascular: RRR, S1, S2 normal, +KIERSTEN grade 2/6, no click, rub or gallop  Pulmonary/Chest: Effort normal, CTAB. No respiratory distress. No wheezes, rales, or rhonchi, +O2 via NC (chronic)  Abdominal: Soft, NT, ND. +BS, no masses, no organomegaly  Musculoskeletal: Normal range of  motion.  Ext: no c/c/e  Neurological: L sided weakness, chronic from CVA  Skin: Skin is warm and dry. No rashes or lesions  Psychiatric: Normal mood and affect. Behavior is normal.       Recent Results (from the past 24 hour(s))   POCT glucose    Collection Time: 08/13/18 11:58 AM   Result Value Ref Range    POCT Glucose 245 (H) 70 - 110 mg/dL   POCT glucose    Collection Time: 08/13/18  1:42 PM   Result Value Ref Range    POCT Glucose 178 (H) 70 - 110 mg/dL   POCT glucose    Collection Time: 08/13/18  4:28 PM   Result Value Ref Range    POCT Glucose 116 (H) 70 - 110 mg/dL   CBC auto differential    Collection Time: 08/13/18  7:37 PM   Result Value Ref Range    WBC 4.83 3.90 - 12.70 K/uL    RBC 2.29 (L) 4.00 - 5.40 M/uL    Hemoglobin 6.9 (L) 12.0 - 16.0 g/dL    Hematocrit 22.3 (L) 37.0 - 48.5 %    MCV 97 82 - 98 fL    MCH 30.1 27.0 - 31.0 pg    MCHC 30.9 (L) 32.0 - 36.0 g/dL    RDW 12.3 11.5 - 14.5 %    Platelets 115 (L) 150 - 350 K/uL    MPV 10.6 9.2 - 12.9 fL    Immature Granulocytes 0.4 0.0 - 0.5 %    Gran # (ANC) 3.2 1.8 - 7.7 K/uL    Immature Grans (Abs) 0.02 0.00 - 0.04 K/uL    Lymph # 0.8 (L) 1.0 - 4.8 K/uL    Mono # 0.6 0.3 - 1.0 K/uL    Eos # 0.2 0.0 - 0.5 K/uL    Baso # 0.01 0.00 - 0.20 K/uL    nRBC 0 0 /100 WBC    Gran% 65.4 38.0 - 73.0 %    Lymph% 16.6 (L) 18.0 - 48.0 %    Mono% 12.8 4.0 - 15.0 %    Eosinophil% 4.6 0.0 - 8.0 %    Basophil% 0.2 0.0 - 1.9 %    Differential Method Automated    Type & Screen    Collection Time: 08/13/18  7:37 PM   Result Value Ref Range    Group & Rh O POS     Indirect Cha NEG    Prepare RBC 1 Unit    Collection Time: 08/13/18  7:37 PM   Result Value Ref Range    UNIT NUMBER [id number]     PRODUCT CODE [id number]     DISPENSE STATUS CROSSMATCHED     CODING SYSTEM HXDF852     Unit Blood Type Code 5100     Unit Blood Type O POS     Unit Expiration [id number]    POCT glucose    Collection Time: 08/13/18 10:41 PM   Result Value Ref Range    POCT Glucose 180 (H) 70 - 110  mg/dL   Renal function panel    Collection Time: 08/14/18  3:59 AM   Result Value Ref Range    Glucose 153 (H) 70 - 110 mg/dL    Sodium 139 136 - 145 mmol/L    Potassium 3.9 3.5 - 5.1 mmol/L    Chloride 104 95 - 110 mmol/L    CO2 25 23 - 29 mmol/L    BUN, Bld 30 (H) 6 - 20 mg/dL    Calcium 8.7 8.7 - 10.5 mg/dL    Creatinine 2.0 (H) 0.5 - 1.4 mg/dL    Albumin 3.1 (L) 3.5 - 5.2 g/dL    Phosphorus 3.1 2.7 - 4.5 mg/dL    eGFR if African American 30.8 (A) >60 mL/min/1.73 m^2    eGFR if non  26.7 (A) >60 mL/min/1.73 m^2    Anion Gap 10 8 - 16 mmol/L   CBC with Automated Differential    Collection Time: 08/14/18  4:00 AM   Result Value Ref Range    WBC 4.85 3.90 - 12.70 K/uL    RBC 2.31 (L) 4.00 - 5.40 M/uL    Hemoglobin 7.0 (L) 12.0 - 16.0 g/dL    Hematocrit 22.9 (L) 37.0 - 48.5 %    MCV 99 (H) 82 - 98 fL    MCH 30.3 27.0 - 31.0 pg    MCHC 30.6 (L) 32.0 - 36.0 g/dL    RDW 12.4 11.5 - 14.5 %    Platelets 118 (L) 150 - 350 K/uL    MPV 10.4 9.2 - 12.9 fL    Immature Granulocytes 0.2 0.0 - 0.5 %    Gran # (ANC) 3.0 1.8 - 7.7 K/uL    Immature Grans (Abs) 0.01 0.00 - 0.04 K/uL    Lymph # 0.9 (L) 1.0 - 4.8 K/uL    Mono # 0.7 0.3 - 1.0 K/uL    Eos # 0.2 0.0 - 0.5 K/uL    Baso # 0.02 0.00 - 0.20 K/uL    nRBC 0 0 /100 WBC    Gran% 62.8 38.0 - 73.0 %    Lymph% 18.1 18.0 - 48.0 %    Mono% 13.8 4.0 - 15.0 %    Eosinophil% 4.7 0.0 - 8.0 %    Basophil% 0.4 0.0 - 1.9 %    Differential Method Automated    POCT glucose    Collection Time: 08/14/18  9:34 AM   Result Value Ref Range    POCT Glucose 239 (H) 70 - 110 mg/dL       Recent Labs   Lab  08/13/18   0601  08/13/18   1158  08/13/18   1342  08/13/18   1628  08/13/18   2241  08/14/18   0934   POCTGLUCOSE  189*  245*  178*  116*  180*  239*       Hemoglobin A1C   Date Value Ref Range Status   08/10/2018 5.6 4.0 - 5.6 % Final     Comment:     ADA Screening Guidelines:  5.7-6.4%  Consistent with prediabetes  >or=6.5%  Consistent with diabetes  High levels of fetal hemoglobin  interfere with the HbA1C  assay. Heterozygous hemoglobin variants (HbS, HgC, etc)do  not significantly interfere with this assay.   However, presence of multiple variants may affect accuracy.     04/25/2018 5.8 (H) 4.0 - 5.6 % Final     Comment:     According to ADA guidelines, hemoglobin A1c <7.0% represents  optimal control in non-pregnant diabetic patients. Different  metrics may apply to specific patient populations.   Standards of Medical Care in Diabetes-2016.  For the purpose of screening for the presence of diabetes:  <5.7%     Consistent with the absence of diabetes  5.7-6.4%  Consistent with increasing risk for diabetes   (prediabetes)  >or=6.5%  Consistent with diabetes  Currently, no consensus exists for use of hemoglobin A1c  for diagnosis of diabetes for children.  This Hemoglobin A1c assay has significant interference with fetal   hemoglobin   (HbF). The results are invalid for patients with abnormal amounts of   HbF,   including those with known Hereditary Persistence   of Fetal Hemoglobin. Heterozygous hemoglobin variants (HbAS, HbAC,   HbAD, HbAE, HbA2) do not significantly interfere with this assay;   however, presence of multiple variants in a sample may impact the %   interference.     02/22/2018 4.8 4.0 - 5.6 % Final     Comment:     According to ADA guidelines, hemoglobin A1c <7.0% represents  optimal control in non-pregnant diabetic patients. Different  metrics may apply to specific patient populations.   Standards of Medical Care in Diabetes-2016.  For the purpose of screening for the presence of diabetes:  <5.7%     Consistent with the absence of diabetes  5.7-6.4%  Consistent with increasing risk for diabetes   (prediabetes)  >or=6.5%  Consistent with diabetes  Currently, no consensus exists for use of hemoglobin A1c  for diagnosis of diabetes for children.  This Hemoglobin A1c assay has significant interference with fetal   hemoglobin   (HbF). The results are invalid for patients with  abnormal amounts of   HbF,   including those with known Hereditary Persistence   of Fetal Hemoglobin. Heterozygous hemoglobin variants (HbAS, HbAC,   HbAD, HbAE, HbA2) do not significantly interfere with this assay;   however, presence of multiple variants in a sample may impact the %   interference.          Active Hospital Problems    Diagnosis  POA    *Uremia [N19]  Yes    ESRD (end stage renal disease) [N18.6]  Yes    Hypertensive urgency [I16.0]  Yes    Type 2 diabetes mellitus with hyperglycemia, without long-term current use of insulin [E11.65]  Yes    Hyperkalemia [E87.5]  Yes    Acute kidney failure [N17.9]  Yes    Fatigue [R53.83]  Yes    Essential hypertension [I10]  Yes    Anemia in stage 4 chronic kidney disease [N18.4, D63.1]  Yes    History of stroke [Z86.73]  Not Applicable     s/p R-MCA stroke with R-putaminal hemorrhagic transformation in 8/2016 and 11/2016 (s/p hemicraniotomy at [facility]) with residual L hemiparesis, on AED s/p CVA        Stage 4 chronic kidney disease [N18.4]  Yes      Resolved Hospital Problems   No resolved problems to display.         sodium chloride 0.9%   Intravenous Once    aspirin  81 mg Oral Daily    atorvastatin  40 mg Oral Daily    carvedilol  25 mg Oral BID    [START ON 8/15/2018] epoetin josh (PROCRIT) injection  5,000 Units Subcutaneous Every Mon, Wed, Fri    furosemide  40 mg Oral Daily    hydrALAZINE  50 mg Oral TID    levETIRAcetam  500 mg Oral Q8H    NIFEdipine  90 mg Oral QHS    polyethylene glycol  17 g Oral Daily    sodium bicarbonate  1,300 mg Oral BID    tuberculin  5 Units Intradermal Once     sodium chloride, sodium chloride 0.9%, sodium chloride 0.9%, sodium chloride 0.9%, acetaminophen, acetaminophen, albuterol-ipratropium, bisacodyl, [COMPLETED] calcium gluconate IVPB **AND** calcium gluconate IVPB, dextrose 50%, dextrose 50%, glucagon (human recombinant), glucose, glucose, heparin (porcine), insulin aspart U-100, labetalol,  ondansetron, prochlorperazine, sodium chloride 0.9%    Assessment and Plan for problems addressed today:   1. Uremia with ESRD  - with nausea, vomiting, fatigue, BUN higher than baseline, overall worsening renal function  - nephrology consulted, DOMONIQUE placed R permacath for HD, first HD session done 8/10/2018  - awaiting further nepro recs  - outpatient HD lab ordered. SW has been alerted for HD chair set up  - family had questions about home HD, discussed case with Nephrology fellow, at this time will need outpatient HD first then eventually can be transfer to home HD by her outpatient nephrologist if able at that time      2. Fatigue   - given uremia   - CTM post HD     3. Hyperkalemia, resolved with HD   - 5.8 on admit with peaked T waves on EKG  - BMP in am       4. Type 2 diabetes mellitus with hyperglycemia, without long-term current use of insulin   - diet controlled, diabetic diet, low dose SSI  - A1c 5.8 in 04/2018, recheck in AM      5. Essential hypertension with Hypertensive Urgency at admission, improved  - Uncontrolled, multiple doses of IV hydralazine in ED unsuccessful  - continue home medications: coreg 25 mg bid (increased), lasix 40 mg daily, nifedipine 90 mg daily  - decrease hydralazine to 50mg TID  - given HD initiation, will monitor can D/C or decrease med if needed     6. Anemia in stage 4 chronic kidney disease  - s/p 1 unit PRBCs yesterday, improved this am  - EPO per nephrology  - on iron supplementation   - CBC in am       7. History of stroke   - left sided weakness, turn q2h, fall risk, aspiration risk  - continue ASA, statin, keppra          Diet: renal  DVT PPx: SCD  Code status: FULL    Discharge plan and follow up: awaiting outpatient HD chair, C arranged, follow up Hgb in am     Salud Portillo MD  Hospital Medicine Staff  [phone] pager

## 2018-08-14 NOTE — PROGRESS NOTES
Summary:  Chart review - pt continues to remain inpatient with no tentative discharge date. As a result, LCSW will close out there referral due to inpatient status. OPCM-RN, Conchita garcia.    Intervention:  Chart review  Collaborated with OPCM-RN  Case closure    Plan;  None

## 2018-08-14 NOTE — TREATMENT PLAN
Patient consented for blood. She is alert, oriented to person, place, time, situation. She states she had a transfusion last year for low blood levels w/o any reaction. Attempted to call  to explain POC, but phone rang to patient. She states there is no other number to reach him.    Monty Herbert, [facility]

## 2018-08-14 NOTE — PLAN OF CARE
Problem: Patient Care Overview  Goal: Plan of Care Review  Outcome: Ongoing (interventions implemented as appropriate)  Reviewed POC with nsg intervention. Pt and spouse verbalized understanding. Pt will receive Blood transfusion after PICC team places 20 gauge IV. Pt verbalized understanding.

## 2018-08-14 NOTE — PLAN OF CARE
Covering SW spoke with pt's spouse Wilder to update on discharge plan of care for outpatient dialysis at [facility] and home health with [facility] upon discharge. Spouse voiced understanding.     3:10 PM  Covering SW contacted [facility] to follow up on referral. SW informed that insurance verification and doctor acceptance is pending. SW will follow up.    Love Peña, NICOLE  [facility]

## 2018-08-14 NOTE — PLAN OF CARE
Future Appointments   Date Time Provider Department Center   8/17/2018 11:30 AM INJECTION, [facility] INFUSION [facility] [place]   8/31/2018  9:00 AM LAB, [facility] LAB [place]   9/5/2018 11:00 AM Beverly Muniz MD [facility] ODALYS [place]   9/17/2018  9:00 AM LAB, HEMONC SAME DAY [facility] HO [place]   9/17/2018 10:00 AM Mike Owen MD [facility] BM [facility] [place]   9/17/2018 10:45 AM INJECTION, [facility] INFUSION [facility]

## 2018-08-15 ENCOUNTER — TELEPHONE (OUTPATIENT)
Dept: ADMINISTRATIVE | Facility: CLINIC | Age: 58
End: 2018-08-15

## 2018-08-15 LAB
ALBUMIN SERPL BCP-MCNC: 3.3 G/DL
ANION GAP SERPL CALC-SCNC: 10 MMOL/L
BASOPHILS # BLD AUTO: 0.01 K/UL
BASOPHILS NFR BLD: 0.2 %
BUN SERPL-MCNC: 46 MG/DL
CALCIUM SERPL-MCNC: 9.5 MG/DL
CHLORIDE SERPL-SCNC: 101 MMOL/L
CO2 SERPL-SCNC: 29 MMOL/L
CREAT SERPL-MCNC: 2.9 MG/DL
DIFFERENTIAL METHOD: ABNORMAL
EOSINOPHIL # BLD AUTO: 0.2 K/UL
EOSINOPHIL NFR BLD: 3.6 %
ERYTHROCYTE [DISTWIDTH] IN BLOOD BY AUTOMATED COUNT: 12.1 %
EST. GFR  (AFRICAN AMERICAN): 19.7 ML/MIN/1.73 M^2
EST. GFR  (NON AFRICAN AMERICAN): 17.1 ML/MIN/1.73 M^2
GLUCOSE SERPL-MCNC: 176 MG/DL
HCT VFR BLD AUTO: 24.5 %
HGB BLD-MCNC: 7.6 G/DL
IMM GRANULOCYTES # BLD AUTO: 0.01 K/UL
IMM GRANULOCYTES NFR BLD AUTO: 0.2 %
LYMPHOCYTES # BLD AUTO: 0.7 K/UL
LYMPHOCYTES NFR BLD: 15.6 %
MCH RBC QN AUTO: 30 PG
MCHC RBC AUTO-ENTMCNC: 31 G/DL
MCV RBC AUTO: 97 FL
MONOCYTES # BLD AUTO: 0.8 K/UL
MONOCYTES NFR BLD: 16.5 %
NEUTROPHILS # BLD AUTO: 3 K/UL
NEUTROPHILS NFR BLD: 63.9 %
NRBC BLD-RTO: 0 /100 WBC
PHOSPHATE SERPL-MCNC: 4.2 MG/DL
PLATELET # BLD AUTO: 153 K/UL
PMV BLD AUTO: 11 FL
POCT GLUCOSE: 145 MG/DL (ref 70–110)
POCT GLUCOSE: 170 MG/DL (ref 70–110)
POCT GLUCOSE: 178 MG/DL (ref 70–110)
POCT GLUCOSE: 180 MG/DL (ref 70–110)
POCT GLUCOSE: 186 MG/DL (ref 70–110)
POTASSIUM SERPL-SCNC: 4.3 MMOL/L
RBC # BLD AUTO: 2.53 M/UL
SODIUM SERPL-SCNC: 140 MMOL/L
WBC # BLD AUTO: 4.73 K/UL

## 2018-08-15 PROCEDURE — 94761 N-INVAS EAR/PLS OXIMETRY MLT: CPT

## 2018-08-15 PROCEDURE — 25000003 PHARM REV CODE 250: Performed by: INTERNAL MEDICINE

## 2018-08-15 PROCEDURE — 11000001 HC ACUTE MED/SURG PRIVATE ROOM

## 2018-08-15 PROCEDURE — 25000003 PHARM REV CODE 250: Performed by: PHYSICIAN ASSISTANT

## 2018-08-15 PROCEDURE — 36415 COLL VENOUS BLD VENIPUNCTURE: CPT

## 2018-08-15 PROCEDURE — 96372 THER/PROPH/DIAG INJ SC/IM: CPT

## 2018-08-15 PROCEDURE — 85025 COMPLETE CBC W/AUTO DIFF WBC: CPT

## 2018-08-15 PROCEDURE — 90935 HEMODIALYSIS ONE EVALUATION: CPT

## 2018-08-15 PROCEDURE — 63600175 PHARM REV CODE 636 W HCPCS: Performed by: INTERNAL MEDICINE

## 2018-08-15 PROCEDURE — 25000003 PHARM REV CODE 250: Performed by: HOSPITALIST

## 2018-08-15 PROCEDURE — 80069 RENAL FUNCTION PANEL: CPT

## 2018-08-15 PROCEDURE — 99232 SBSQ HOSP IP/OBS MODERATE 35: CPT | Mod: ,,, | Performed by: INTERNAL MEDICINE

## 2018-08-15 PROCEDURE — 90935 HEMODIALYSIS ONE EVALUATION: CPT | Mod: ,,, | Performed by: INTERNAL MEDICINE

## 2018-08-15 RX ORDER — PROMETHAZINE HYDROCHLORIDE 25 MG/ML
25 INJECTION, SOLUTION INTRAMUSCULAR; INTRAVENOUS EVERY 6 HOURS PRN
Status: DISCONTINUED | OUTPATIENT
Start: 2018-08-15 | End: 2018-08-15 | Stop reason: CLARIF

## 2018-08-15 RX ORDER — HYDRALAZINE HYDROCHLORIDE 50 MG/1
100 TABLET, FILM COATED ORAL 3 TIMES DAILY
Status: DISCONTINUED | OUTPATIENT
Start: 2018-08-15 | End: 2018-08-18 | Stop reason: HOSPADM

## 2018-08-15 RX ADMIN — HYDRALAZINE HYDROCHLORIDE 100 MG: 50 TABLET ORAL at 09:08

## 2018-08-15 RX ADMIN — ONDANSETRON 8 MG: 8 TABLET, ORALLY DISINTEGRATING ORAL at 09:08

## 2018-08-15 RX ADMIN — ONDANSETRON 8 MG: 8 TABLET, ORALLY DISINTEGRATING ORAL at 01:08

## 2018-08-15 RX ADMIN — SODIUM CHLORIDE 350 ML: 0.9 INJECTION, SOLUTION INTRAVENOUS at 05:08

## 2018-08-15 RX ADMIN — NIFEDIPINE 90 MG: 30 TABLET, FILM COATED, EXTENDED RELEASE ORAL at 09:08

## 2018-08-15 RX ADMIN — LEVETIRACETAM 500 MG: 500 TABLET ORAL at 09:08

## 2018-08-15 RX ADMIN — SODIUM BICARBONATE 650 MG TABLET 1300 MG: at 08:08

## 2018-08-15 RX ADMIN — SODIUM BICARBONATE 650 MG TABLET 1300 MG: at 09:08

## 2018-08-15 RX ADMIN — CARVEDILOL 25 MG: 25 TABLET, FILM COATED ORAL at 08:08

## 2018-08-15 RX ADMIN — FUROSEMIDE 40 MG: 40 TABLET ORAL at 08:08

## 2018-08-15 RX ADMIN — HEPARIN SODIUM 1000 UNITS: 1000 INJECTION, SOLUTION INTRAVENOUS; SUBCUTANEOUS at 05:08

## 2018-08-15 RX ADMIN — HYDRALAZINE HYDROCHLORIDE 100 MG: 50 TABLET ORAL at 08:08

## 2018-08-15 RX ADMIN — ATORVASTATIN CALCIUM 40 MG: 20 TABLET, FILM COATED ORAL at 08:08

## 2018-08-15 RX ADMIN — CARVEDILOL 25 MG: 25 TABLET, FILM COATED ORAL at 09:08

## 2018-08-15 RX ADMIN — PROMETHAZINE HYDROCHLORIDE 25 MG: 25 INJECTION INTRAMUSCULAR; INTRAVENOUS at 01:08

## 2018-08-15 RX ADMIN — POLYETHYLENE GLYCOL 3350 17 G: 17 POWDER, FOR SOLUTION ORAL at 08:08

## 2018-08-15 RX ADMIN — ASPIRIN 81 MG: 81 TABLET, COATED ORAL at 08:08

## 2018-08-15 RX ADMIN — LEVETIRACETAM 500 MG: 500 TABLET ORAL at 05:08

## 2018-08-15 NOTE — PLAN OF CARE
Covering LANE spoke with Kevin with [facility] to follow up on outpatient HD chair. Kevin states that pt's insurance has been verified. They are currently waiting on the [facility] location to approve the patient. Kevin states that  will follow up shortly with Lupe Gibbs and LANE will be informed once chair time has been confirmed. Kevin states that he expects to have an answer from Lupe Gibbs today. LANE will continue to follow.    Love Peña, NICOLE  [facility]

## 2018-08-15 NOTE — PROGRESS NOTES
Report received from KARINA Martinez. Pt arrived from floor AAOx4. Acute dialysis initiated via R chestwall permcath without difficulty.

## 2018-08-15 NOTE — PROGRESS NOTES
3.5hr HD treatment completed 3L of fluid removed pt tolerated well. Both lumens of a R chestwall permcath instilled with 1.6cc of Heparin, capped and taped. Report given to KARINA Martinez.

## 2018-08-15 NOTE — ASSESSMENT & PLAN NOTE
Now progressing towards stage V, etiology HTN/DM, now with worsening electrolytes and uremic symptoms (lethargy, nausea, vomiting and asterixis on exam), admitted for tentative RRT initiation.    Plan/Recommendations:  1) HD in the [facility] this afternoon  2) placement in dialysis unit prior to disca

## 2018-08-15 NOTE — SUBJECTIVE & OBJECTIVE
Interval History: no new issue, going for HD in the [facility] this afternoon    Review of patient's allergies indicates:   Allergen Reactions    Lisinopril Other (See Comments)     Angioedema      Vicodin [hydrocodone-acetaminophen] Rash     Current Facility-Administered Medications   Medication Frequency    0.9%  NaCl infusion (for blood administration) Q24H PRN    0.9%  NaCl infusion PRN    0.9%  NaCl infusion Once    0.9%  NaCl infusion PRN    0.9%  NaCl infusion PRN    0.9%  NaCl infusion PRN    0.9%  NaCl infusion Once    acetaminophen tablet 650 mg Q4H PRN    acetaminophen tablet 650 mg Q8H PRN    albuterol-ipratropium 2.5 mg-0.5 mg/3 mL nebulizer solution 3 mL Q4H PRN    aspirin EC tablet 81 mg Daily    atorvastatin tablet 40 mg Daily    bisacodyl suppository 10 mg Daily PRN    calcium gluconate 1 g in dextrose 5 % 100 mL IVPB Q10 Min PRN    carvedilol tablet 25 mg BID    dextrose 50% injection 12.5 g PRN    dextrose 50% injection 25 g PRN    epoetin josh (PROCRIT) injection 5,000 units kit Every Mon, Wed, Fri    furosemide tablet 40 mg Daily    glucagon (human recombinant) injection 1 mg PRN    glucose chewable tablet 16 g PRN    glucose chewable tablet 24 g PRN    heparin (porcine) injection 1,000 Units PRN    hydrALAZINE tablet 100 mg TID    insulin aspart U-100 pen 0-5 Units QID (AC + HS) PRN    labetalol injection 20 mg Q4H PRN    levETIRAcetam tablet 500 mg Q8H    NIFEdipine 24 hr tablet 90 mg QHS    ondansetron disintegrating tablet 8 mg Q8H PRN    polyethylene glycol packet 17 g Daily    promethazine (PHENERGAN) 25 mg in dextrose 5 % 50 mL IVPB Q6H PRN    sodium bicarbonate tablet 1,300 mg BID    sodium chloride 0.9% flush 5 mL PRN    tuberculin injection 5 Units Once       Objective:     Vital Signs (Most Recent):  Temp: 98.6 °F (37 °C) (08/15/18 1153)  Pulse: 68 (08/15/18 1501)  Resp: 16 (08/15/18 1153)  BP: (!) 193/76 (08/15/18 1153)  SpO2: 97 % (08/15/18 1153)  O2  Device (Oxygen Therapy): room air (08/15/18 1050) Vital Signs (24h Range):  Temp:  [98.5 °F (36.9 °C)-99.7 °F (37.6 °C)] 98.6 °F (37 °C)  Pulse:  [63-81] 68  Resp:  [14-20] 16  SpO2:  [93 %-99 %] 97 %  BP: (137-208)/(63-84) 193/76     Weight: 87.8 kg (193 lb 9 oz) (08/15/18 0338)  Body mass index is 34.29 kg/m².  Body surface area is 1.98 meters squared.    I/O last 3 completed shifts:  In: 360 [P.O.:360]  Out: -     Physical Exam   HENT:   Head: Atraumatic.   Neck: No JVD present.   Cardiovascular: Normal rate and regular rhythm.   Pulmonary/Chest: Effort normal and breath sounds normal.   Abdominal: Soft. She exhibits no distension.   Musculoskeletal: She exhibits edema (ankle). She exhibits no tenderness.   Neurological: She is alert.   Skin: Skin is warm.

## 2018-08-15 NOTE — ASSESSMENT & PLAN NOTE
Significant drop over the weekend (9/7 --> 6.4) and iron studies from 8/7 indicate patient is not iron deficient, could have lost some blood during perm cath placement, but very unlikely it would be a drop to this extent, also possibly Hgb > 9 on admit and a day after not accurate, there is no report of other bleeding either, she has been started on epo, we're continuing to give on dialysis days, primary team for other causes of anemia

## 2018-08-15 NOTE — PROGRESS NOTES
[facility]  Nephrology  Progress Note    Patient Name: Lucy Perez  MRN: [id number]  Admission Date: 8/9/2018  Hospital Length of Stay: 5 days  Attending Provider: Salud Portillo MD   Primary Care Physician: Beverly Muniz MD  Principal Problem:Uremia    Subjective:     HPI: 58 yo AAF with diet controlled T2DM, HTN, CVA with left sided weakness, CKD4 (HTN and T2DM). Has been experiencing increasing fatigue, tiredness, nausea, episodes of vomiting.  She has resisted dialysis discussion with her nephrologist, however her labs and CKD has been progressing rapidly over the past year, labs were checked yesterday and note with Bun:Cr of 96:3.6, after discussion with her PCP and nephrologist (Dr Blank) it was decided she be admitted for HD initiation, she unfortunately does not have access.       Interval History: no new issue, going for HD in the [facility] this afternoon    Review of patient's allergies indicates:   Allergen Reactions    Lisinopril Other (See Comments)     Angioedema      Vicodin [hydrocodone-acetaminophen] Rash     Current Facility-Administered Medications   Medication Frequency    0.9%  NaCl infusion (for blood administration) Q24H PRN    0.9%  NaCl infusion PRN    0.9%  NaCl infusion Once    0.9%  NaCl infusion PRN    0.9%  NaCl infusion PRN    0.9%  NaCl infusion PRN    0.9%  NaCl infusion Once    acetaminophen tablet 650 mg Q4H PRN    acetaminophen tablet 650 mg Q8H PRN    albuterol-ipratropium 2.5 mg-0.5 mg/3 mL nebulizer solution 3 mL Q4H PRN    aspirin EC tablet 81 mg Daily    atorvastatin tablet 40 mg Daily    bisacodyl suppository 10 mg Daily PRN    calcium gluconate 1 g in dextrose 5 % 100 mL IVPB Q10 Min PRN    carvedilol tablet 25 mg BID    dextrose 50% injection 12.5 g PRN    dextrose 50% injection 25 g PRN    epoetin josh (PROCRIT) injection 5,000 units kit Every Mon, Wed, Fri    furosemide tablet 40 mg Daily    glucagon (human recombinant)  injection 1 mg PRN    glucose chewable tablet 16 g PRN    glucose chewable tablet 24 g PRN    heparin (porcine) injection 1,000 Units PRN    hydrALAZINE tablet 100 mg TID    insulin aspart U-100 pen 0-5 Units QID (AC + HS) PRN    labetalol injection 20 mg Q4H PRN    levETIRAcetam tablet 500 mg Q8H    NIFEdipine 24 hr tablet 90 mg QHS    ondansetron disintegrating tablet 8 mg Q8H PRN    polyethylene glycol packet 17 g Daily    promethazine (PHENERGAN) 25 mg in dextrose 5 % 50 mL IVPB Q6H PRN    sodium bicarbonate tablet 1,300 mg BID    sodium chloride 0.9% flush 5 mL PRN    tuberculin injection 5 Units Once       Objective:     Vital Signs (Most Recent):  Temp: 98.6 °F (37 °C) (08/15/18 1153)  Pulse: 68 (08/15/18 1501)  Resp: 16 (08/15/18 1153)  BP: (!) 193/76 (08/15/18 1153)  SpO2: 97 % (08/15/18 1153)  O2 Device (Oxygen Therapy): room air (08/15/18 1050) Vital Signs (24h Range):  Temp:  [98.5 °F (36.9 °C)-99.7 °F (37.6 °C)] 98.6 °F (37 °C)  Pulse:  [63-81] 68  Resp:  [14-20] 16  SpO2:  [93 %-99 %] 97 %  BP: (137-208)/(63-84) 193/76     Weight: 87.8 kg (193 lb 9 oz) (08/15/18 0338)  Body mass index is 34.29 kg/m².  Body surface area is 1.98 meters squared.    I/O last 3 completed shifts:  In: 360 [P.O.:360]  Out: -     Physical Exam   HENT:   Head: Atraumatic.   Neck: No JVD present.   Cardiovascular: Normal rate and regular rhythm.   Pulmonary/Chest: Effort normal and breath sounds normal.   Abdominal: Soft. She exhibits no distension.   Musculoskeletal: She exhibits edema (ankle). She exhibits no tenderness.   Neurological: She is alert.   Skin: Skin is warm.         Assessment/Plan:     Anemia in stage 4 chronic kidney disease    Significant drop over the weekend (9/7 --> 6.4) and iron studies from 8/7 indicate patient is not iron deficient, could have lost some blood during perm cath placement, but very unlikely it would be a drop to this extent, also possibly Hgb > 9 on admit and a day after not  accurate, there is no report of other bleeding either, she has been started on epo, we're continuing to give on dialysis days, primary team for other causes of anemia        Stage 4 chronic kidney disease    Now progressing towards stage V, etiology HTN/DM, now with worsening electrolytes and uremic symptoms (lethargy, nausea, vomiting and asterixis on exam), admitted for tentative RRT initiation.    Plan/Recommendations:  1) HD in the [facility] this afternoon  2) placement in dialysis unit prior to disca            Thank you for your consult. I will follow-up with patient. Please contact us if you have any additional questions.    Dallas Cordero MD  Nephrology  [facility]    ATTENDING PHYSICIAN ATTESTATION  I have personally interviewed and examined the patient. I thoroughly reviewed the demographic, clinical, laboratorial and imaging information available in medical records. I agree with the assessment and recommendations provided by the subspecialty resident.  was under my supervision.     HEMODIALYSIS NOTE  Patient evaluated while undergoing hemodialysis indicated for ESRD. Tolerating session with current UFR, no complications.

## 2018-08-16 LAB
ALBUMIN SERPL BCP-MCNC: 3.2 G/DL
ANION GAP SERPL CALC-SCNC: 12 MMOL/L
BASOPHILS # BLD AUTO: 0.02 K/UL
BASOPHILS NFR BLD: 0.4 %
BUN SERPL-MCNC: 34 MG/DL
CALCIUM SERPL-MCNC: 9 MG/DL
CHLORIDE SERPL-SCNC: 105 MMOL/L
CO2 SERPL-SCNC: 22 MMOL/L
CREAT SERPL-MCNC: 2.5 MG/DL
DIFFERENTIAL METHOD: ABNORMAL
EOSINOPHIL # BLD AUTO: 0.1 K/UL
EOSINOPHIL NFR BLD: 1.9 %
ERYTHROCYTE [DISTWIDTH] IN BLOOD BY AUTOMATED COUNT: 12.3 %
EST. GFR  (AFRICAN AMERICAN): 23.5 ML/MIN/1.73 M^2
EST. GFR  (NON AFRICAN AMERICAN): 20.4 ML/MIN/1.73 M^2
GLUCOSE SERPL-MCNC: 154 MG/DL
HCT VFR BLD AUTO: 24.6 %
HGB BLD-MCNC: 7.4 G/DL
IMM GRANULOCYTES # BLD AUTO: 0.02 K/UL
IMM GRANULOCYTES NFR BLD AUTO: 0.4 %
LYMPHOCYTES # BLD AUTO: 0.8 K/UL
LYMPHOCYTES NFR BLD: 15.4 %
MCH RBC QN AUTO: 30.1 PG
MCHC RBC AUTO-ENTMCNC: 30.1 G/DL
MCV RBC AUTO: 100 FL
MONOCYTES # BLD AUTO: 0.8 K/UL
MONOCYTES NFR BLD: 15.2 %
NEUTROPHILS # BLD AUTO: 3.5 K/UL
NEUTROPHILS NFR BLD: 66.7 %
NRBC BLD-RTO: 0 /100 WBC
PHOSPHATE SERPL-MCNC: 4.5 MG/DL
PLATELET # BLD AUTO: 181 K/UL
PMV BLD AUTO: 11 FL
POCT GLUCOSE: 144 MG/DL (ref 70–110)
POCT GLUCOSE: 152 MG/DL (ref 70–110)
POCT GLUCOSE: 228 MG/DL (ref 70–110)
POTASSIUM SERPL-SCNC: 4.5 MMOL/L
RBC # BLD AUTO: 2.46 M/UL
SODIUM SERPL-SCNC: 139 MMOL/L
WBC # BLD AUTO: 5.27 K/UL

## 2018-08-16 PROCEDURE — 36415 COLL VENOUS BLD VENIPUNCTURE: CPT

## 2018-08-16 PROCEDURE — 25000003 PHARM REV CODE 250: Performed by: INTERNAL MEDICINE

## 2018-08-16 PROCEDURE — 99231 SBSQ HOSP IP/OBS SF/LOW 25: CPT | Mod: GC,,, | Performed by: INTERNAL MEDICINE

## 2018-08-16 PROCEDURE — 80069 RENAL FUNCTION PANEL: CPT

## 2018-08-16 PROCEDURE — 99232 SBSQ HOSP IP/OBS MODERATE 35: CPT | Mod: ,,, | Performed by: INTERNAL MEDICINE

## 2018-08-16 PROCEDURE — 11000001 HC ACUTE MED/SURG PRIVATE ROOM

## 2018-08-16 PROCEDURE — 85025 COMPLETE CBC W/AUTO DIFF WBC: CPT

## 2018-08-16 PROCEDURE — 25000003 PHARM REV CODE 250: Performed by: PHYSICIAN ASSISTANT

## 2018-08-16 RX ORDER — SODIUM CHLORIDE 9 MG/ML
INJECTION, SOLUTION INTRAVENOUS ONCE
Status: COMPLETED | OUTPATIENT
Start: 2018-08-16 | End: 2018-08-17

## 2018-08-16 RX ORDER — BISACODYL 5 MG
5 TABLET, DELAYED RELEASE (ENTERIC COATED) ORAL DAILY PRN
Status: DISCONTINUED | OUTPATIENT
Start: 2018-08-16 | End: 2018-08-18 | Stop reason: HOSPADM

## 2018-08-16 RX ORDER — SODIUM CHLORIDE 9 MG/ML
INJECTION, SOLUTION INTRAVENOUS
Status: DISCONTINUED | OUTPATIENT
Start: 2018-08-16 | End: 2018-08-18 | Stop reason: HOSPADM

## 2018-08-16 RX ADMIN — ATORVASTATIN CALCIUM 40 MG: 20 TABLET, FILM COATED ORAL at 09:08

## 2018-08-16 RX ADMIN — SODIUM BICARBONATE 650 MG TABLET 1300 MG: at 09:08

## 2018-08-16 RX ADMIN — BISACODYL 5 MG: 5 TABLET, COATED ORAL at 10:08

## 2018-08-16 RX ADMIN — FUROSEMIDE 40 MG: 40 TABLET ORAL at 09:08

## 2018-08-16 RX ADMIN — ACETAMINOPHEN 650 MG: 325 TABLET, FILM COATED ORAL at 09:08

## 2018-08-16 RX ADMIN — LEVETIRACETAM 500 MG: 500 TABLET ORAL at 05:08

## 2018-08-16 RX ADMIN — POLYETHYLENE GLYCOL 3350 17 G: 17 POWDER, FOR SOLUTION ORAL at 09:08

## 2018-08-16 RX ADMIN — LEVETIRACETAM 500 MG: 500 TABLET ORAL at 09:08

## 2018-08-16 RX ADMIN — ACETAMINOPHEN 650 MG: 325 TABLET, FILM COATED ORAL at 10:08

## 2018-08-16 RX ADMIN — LEVETIRACETAM 500 MG: 500 TABLET ORAL at 01:08

## 2018-08-16 RX ADMIN — ASPIRIN 81 MG: 81 TABLET, COATED ORAL at 09:08

## 2018-08-16 NOTE — PROGRESS NOTES
[facility]  Nephrology  Progress Note    Patient Name: Lucy Perez  MRN: [id number]  Admission Date: 8/9/2018  Hospital Length of Stay: 6 days  Attending Provider: Salud Portillo MD   Primary Care Physician: Beverly Muniz MD  Principal Problem:Uremia    Subjective:     HPI: 58 yo AAF with diet controlled T2DM, HTN, CVA with left sided weakness, CKD4 (HTN and T2DM). Has been experiencing increasing fatigue, tiredness, nausea, episodes of vomiting.  She has resisted dialysis discussion with her nephrologist, however her labs and CKD has been progressing rapidly over the past year, labs were checked yesterday and note with Bun:Cr of 96:3.6, after discussion with her PCP and nephrologist (Dr Blank) it was decided she be admitted for HD initiation, she unfortunately does not have access.       Interval History: HD yesterday, tolerated w/o issues    Review of patient's allergies indicates:   Allergen Reactions    Lisinopril Other (See Comments)     Angioedema      Vicodin [hydrocodone-acetaminophen] Rash     Current Facility-Administered Medications   Medication Frequency    0.9%  NaCl infusion (for blood administration) Q24H PRN    0.9%  NaCl infusion PRN    0.9%  NaCl infusion Once    0.9%  NaCl infusion PRN    0.9%  NaCl infusion PRN    0.9%  NaCl infusion PRN    acetaminophen tablet 650 mg Q4H PRN    acetaminophen tablet 650 mg Q8H PRN    albuterol-ipratropium 2.5 mg-0.5 mg/3 mL nebulizer solution 3 mL Q4H PRN    aspirin EC tablet 81 mg Daily    atorvastatin tablet 40 mg Daily    bisacodyl EC tablet 5 mg Daily PRN    bisacodyl suppository 10 mg Daily PRN    calcium gluconate 1 g in dextrose 5 % 100 mL IVPB Q10 Min PRN    carvedilol tablet 25 mg BID    dextrose 50% injection 12.5 g PRN    dextrose 50% injection 25 g PRN    epoetin josh (PROCRIT) injection 5,000 units kit Every Mon, Wed, Fri    furosemide tablet 40 mg Daily    glucagon (human recombinant)  injection 1 mg PRN    glucose chewable tablet 16 g PRN    glucose chewable tablet 24 g PRN    heparin (porcine) injection 1,000 Units PRN    hydrALAZINE tablet 100 mg TID    insulin aspart U-100 pen 0-5 Units QID (AC + HS) PRN    labetalol injection 20 mg Q4H PRN    levETIRAcetam tablet 500 mg Q8H    NIFEdipine 24 hr tablet 90 mg QHS    ondansetron disintegrating tablet 8 mg Q8H PRN    polyethylene glycol packet 17 g Daily    promethazine (PHENERGAN) 25 mg in dextrose 5 % 50 mL IVPB Q6H PRN    sodium bicarbonate tablet 1,300 mg BID    sodium chloride 0.9% flush 5 mL PRN    tuberculin injection 5 Units Once       Objective:     Vital Signs (Most Recent):  Temp: 99 °F (37.2 °C) (08/16/18 0723)  Pulse: 76 (08/16/18 1100)  Resp: 16 (08/16/18 0723)  BP: (!) 120/56 (08/16/18 0723)  SpO2: 95 % (08/16/18 0723)  O2 Device (Oxygen Therapy): nasal cannula (08/16/18 0723) Vital Signs (24h Range):  Temp:  [98.4 °F (36.9 °C)-99 °F (37.2 °C)] 99 °F (37.2 °C)  Pulse:  [49-83] 76  Resp:  [16-18] 16  SpO2:  [95 %-99 %] 95 %  BP: ()/(49-88) 120/56     Weight: 84.5 kg (186 lb 4.6 oz) (08/16/18 0400)  Body mass index is 33 kg/m².  Body surface area is 1.94 meters squared.    I/O last 3 completed shifts:  In: 1310 [P.O.:660; Other:650]  Out: 3650 [Other:3650]    Physical Exam   HENT:   Head: Atraumatic.   Neck: No JVD present.   Cardiovascular: Normal rate and regular rhythm.   Pulmonary/Chest: Effort normal and breath sounds normal.   Abdominal: Soft. She exhibits no distension.   Musculoskeletal: She exhibits no edema or tenderness.   Neurological: She is alert.   Skin: Skin is warm.   Psychiatric: She has a normal mood and affect. Her behavior is normal.         Assessment/Plan:     Anemia in stage 4 chronic kidney disease    Significant drop over the weekend (9/7 --> 6.4) and iron studies from 8/7 indicate patient is not iron deficient, could have lost some blood during perm cath placement, but very unlikely it  would be a drop to this extent, also possibly Hgb > 9 on admit and a day after not accurate, there is no report of other bleeding either, she has been started on epo, we're continuing to give on dialysis days, primary team for other causes of anemia        Stage 4 chronic kidney disease    Now progressing towards stage V, etiology HTN/DM, now with worsening electrolytes and uremic symptoms (lethargy, nausea, vomiting and asterixis on exam), admitted for tentative RRT initiation.    Plan/Recommendations:  1) HD tentatively in the [facility] tomorrow  2) placement in dialysis unit prior to disca            Thank you for your consult. I will follow-up with patient. Please contact us if you have any additional questions.    Dallas Cordero MD  Nephrology  [facility]    ATTENDING PHYSICIAN ATTESTATION  I have personally interviewed and examined the patient. I thoroughly reviewed the demographic, clinical, laboratorial and imaging information available in medical records. I agree with the assessment and recommendations provided by the subspecialty resident. Dr. Cordero was under my supervision.

## 2018-08-16 NOTE — SUBJECTIVE & OBJECTIVE
Interval History: HD yesterday, tolerated w/o issues    Review of patient's allergies indicates:   Allergen Reactions    Lisinopril Other (See Comments)     Angioedema      Vicodin [hydrocodone-acetaminophen] Rash     Current Facility-Administered Medications   Medication Frequency    0.9%  NaCl infusion (for blood administration) Q24H PRN    0.9%  NaCl infusion PRN    0.9%  NaCl infusion Once    0.9%  NaCl infusion PRN    0.9%  NaCl infusion PRN    0.9%  NaCl infusion PRN    acetaminophen tablet 650 mg Q4H PRN    acetaminophen tablet 650 mg Q8H PRN    albuterol-ipratropium 2.5 mg-0.5 mg/3 mL nebulizer solution 3 mL Q4H PRN    aspirin EC tablet 81 mg Daily    atorvastatin tablet 40 mg Daily    bisacodyl EC tablet 5 mg Daily PRN    bisacodyl suppository 10 mg Daily PRN    calcium gluconate 1 g in dextrose 5 % 100 mL IVPB Q10 Min PRN    carvedilol tablet 25 mg BID    dextrose 50% injection 12.5 g PRN    dextrose 50% injection 25 g PRN    epoetin josh (PROCRIT) injection 5,000 units kit Every Mon, Wed, Fri    furosemide tablet 40 mg Daily    glucagon (human recombinant) injection 1 mg PRN    glucose chewable tablet 16 g PRN    glucose chewable tablet 24 g PRN    heparin (porcine) injection 1,000 Units PRN    hydrALAZINE tablet 100 mg TID    insulin aspart U-100 pen 0-5 Units QID (AC + HS) PRN    labetalol injection 20 mg Q4H PRN    levETIRAcetam tablet 500 mg Q8H    NIFEdipine 24 hr tablet 90 mg QHS    ondansetron disintegrating tablet 8 mg Q8H PRN    polyethylene glycol packet 17 g Daily    promethazine (PHENERGAN) 25 mg in dextrose 5 % 50 mL IVPB Q6H PRN    sodium bicarbonate tablet 1,300 mg BID    sodium chloride 0.9% flush 5 mL PRN    tuberculin injection 5 Units Once       Objective:     Vital Signs (Most Recent):  Temp: 99 °F (37.2 °C) (08/16/18 0723)  Pulse: 76 (08/16/18 1100)  Resp: 16 (08/16/18 0723)  BP: (!) 120/56 (08/16/18 0723)  SpO2: 95 % (08/16/18 0723)  O2 Device  (Oxygen Therapy): nasal cannula (08/16/18 0723) Vital Signs (24h Range):  Temp:  [98.4 °F (36.9 °C)-99 °F (37.2 °C)] 99 °F (37.2 °C)  Pulse:  [49-83] 76  Resp:  [16-18] 16  SpO2:  [95 %-99 %] 95 %  BP: ()/(49-88) 120/56     Weight: 84.5 kg (186 lb 4.6 oz) (08/16/18 0400)  Body mass index is 33 kg/m².  Body surface area is 1.94 meters squared.    I/O last 3 completed shifts:  In: 1310 [P.O.:660; Other:650]  Out: 3650 [Other:3650]    Physical Exam   HENT:   Head: Atraumatic.   Neck: No JVD present.   Cardiovascular: Normal rate and regular rhythm.   Pulmonary/Chest: Effort normal and breath sounds normal.   Abdominal: Soft. She exhibits no distension.   Musculoskeletal: She exhibits no edema or tenderness.   Neurological: She is alert.   Skin: Skin is warm.   Psychiatric: She has a normal mood and affect. Her behavior is normal.

## 2018-08-16 NOTE — PLAN OF CARE
linh james notified that relative at [facility] wants msw to call him at [phone] re: HD chair  Plan is hh/hd   Future Appointments   Date Time Provider Department Center   8/17/2018 11:30 AM INJECTION, [facility]ION [facility]   8/31/2018  9:00 AM LAB, [facility] LAB [place]   9/5/2018 11:00 AM Beverly Muniz MD [facility] ODALYS CAR [place]   9/17/2018  9:00 AM LAB, HEMONC SAME DAY [facility]   9/17/2018 10:00 AM Mike Owen MD [facility] BM [facility]   9/17/2018 10:45 AM INJECTION, [facility]        08/16/18 0859   Right Care Assessment   Can the patient answer the patient profile reliably? (sleeping)   How often would a person be available to care for the patient? Whenever needed   Describe the patient's ability to walk at the present time. Does not walk or unable to take any steps at all   How does the patient rate their overall health at the present time? Fair   Number of comorbid conditions (as recorded on the chart) Five or more   During the past month, has the patient often been bothered by feeling down, depressed or hopeless? No   During the past month, has the patient often been bothered by little interest or pleasure in doing things? No

## 2018-08-16 NOTE — PROGRESS NOTES
Hospital Medicine   Progress note   Team: [facility] [place], [place] HOSP MED A Salud Portillo MD   Admit Date: 8/9/2018   FAITH 8/16/2018   Code status: Full Code   Principal Problem: Uremia     Interval hx:  No events overnight.  Nausea seems to have improved this am.  Tolerating diet.  Afebrile.  Depressed about having to do HD.  Patient awaiting outpatient HD chair.       ROS   Constitutional: Positive for activity change and fatigue. Negative for fever and unexpected weight change.   Respiratory:  Negative for shortness of breath, cough and wheezing.    Cardiovascular: Negative for chest pain, palpitations and leg swelling.   Gastrointestinal:  Negative for abdominal pain.   Genitourinary: Negative for difficulty urinating and dysuria.   Musculoskeletal: Positive for arthralgias, back pain and gait problem (WC bound).   Neurological: Positive for weakness (L sided, s/p CVA). Negative for seizures, light-headedness and headaches.   Psychiatric/Behavioral: Negative for agitation and confusion.            PEx   Temp:  [98.4 °F (36.9 °C)-99 °F (37.2 °C)]   Pulse:  [49-83]   Resp:  [16-18]   BP: ()/(49-88)   SpO2:  [95 %-99 %]      I & O (Last 24H):     Intake/Output Summary (Last 24 hours) at 8/16/2018 1351  Last data filed at 8/16/2018 0400  Gross per 24 hour   Intake 1130 ml   Output 3650 ml   Net -2520 ml       Constitutional: NAD, Appears well-developed and well-nourished. Chronically ill, temporal wasting   Mental status: Alert and oriented to person, place, and time.  Head: Normocephalic and atraumatic.   Mouth/Throat: Oropharynx is clear and moist.   Eyes: EOM are normal. PERRL, No scleral icterus.   Neck: Normal range of motion. Neck supple. +HD cath in right neck  Cardiovascular: RRR, S1, S2 normal, +KIERSTEN grade 2/6, no click, rub or gallop  Pulmonary/Chest: Effort normal, CTAB. No respiratory distress. No wheezes, rales, or rhonchi, +O2 via NC (chronic)  Abdominal: Soft, NT, ND. +BS, no masses, no  organomegaly  Musculoskeletal: Normal range of motion.  Ext: no c/c/e  Neurological: L sided weakness, chronic from CVA  Skin: Skin is warm and dry. No rashes or lesions  Psychiatric: Depressed mood. Behavior is normal.       Recent Results (from the past 24 hour(s))   POCT glucose    Collection Time: 08/15/18  7:06 PM   Result Value Ref Range    POCT Glucose 145 (H) 70 - 110 mg/dL   CBC with Automated Differential    Collection Time: 08/16/18  4:17 AM   Result Value Ref Range    WBC 5.27 3.90 - 12.70 K/uL    RBC 2.46 (L) 4.00 - 5.40 M/uL    Hemoglobin 7.4 (L) 12.0 - 16.0 g/dL    Hematocrit 24.6 (L) 37.0 - 48.5 %     (H) 82 - 98 fL    MCH 30.1 27.0 - 31.0 pg    MCHC 30.1 (L) 32.0 - 36.0 g/dL    RDW 12.3 11.5 - 14.5 %    Platelets 181 150 - 350 K/uL    MPV 11.0 9.2 - 12.9 fL    Immature Granulocytes 0.4 0.0 - 0.5 %    Gran # (ANC) 3.5 1.8 - 7.7 K/uL    Immature Grans (Abs) 0.02 0.00 - 0.04 K/uL    Lymph # 0.8 (L) 1.0 - 4.8 K/uL    Mono # 0.8 0.3 - 1.0 K/uL    Eos # 0.1 0.0 - 0.5 K/uL    Baso # 0.02 0.00 - 0.20 K/uL    nRBC 0 0 /100 WBC    Gran% 66.7 38.0 - 73.0 %    Lymph% 15.4 (L) 18.0 - 48.0 %    Mono% 15.2 (H) 4.0 - 15.0 %    Eosinophil% 1.9 0.0 - 8.0 %    Basophil% 0.4 0.0 - 1.9 %    Differential Method Automated    Renal function panel    Collection Time: 08/16/18  4:17 AM   Result Value Ref Range    Glucose 154 (H) 70 - 110 mg/dL    Sodium 139 136 - 145 mmol/L    Potassium 4.5 3.5 - 5.1 mmol/L    Chloride 105 95 - 110 mmol/L    CO2 22 (L) 23 - 29 mmol/L    BUN, Bld 34 (H) 6 - 20 mg/dL    Calcium 9.0 8.7 - 10.5 mg/dL    Creatinine 2.5 (H) 0.5 - 1.4 mg/dL    Albumin 3.2 (L) 3.5 - 5.2 g/dL    Phosphorus 4.5 2.7 - 4.5 mg/dL    eGFR if African American 23.5 (A) >60 mL/min/1.73 m^2    eGFR if non African American 20.4 (A) >60 mL/min/1.73 m^2    Anion Gap 12 8 - 16 mmol/L   POCT glucose    Collection Time: 08/16/18  6:16 AM   Result Value Ref Range    POCT Glucose 152 (H) 70 - 110 mg/dL   POCT glucose     Collection Time: 08/16/18  9:05 AM   Result Value Ref Range    POCT Glucose 144 (H) 70 - 110 mg/dL   POCT glucose    Collection Time: 08/16/18 12:55 PM   Result Value Ref Range    POCT Glucose 228 (H) 70 - 110 mg/dL       Recent Labs   Lab  08/15/18   0855  08/15/18   1301  08/15/18   1906  08/16/18   0616  08/16/18   0905  08/16/18   1255   POCTGLUCOSE  178*  170*  145*  152*  144*  228*       Hemoglobin A1C   Date Value Ref Range Status   08/10/2018 5.6 4.0 - 5.6 % Final     Comment:     ADA Screening Guidelines:  5.7-6.4%  Consistent with prediabetes  >or=6.5%  Consistent with diabetes  High levels of fetal hemoglobin interfere with the HbA1C  assay. Heterozygous hemoglobin variants (HbS, HgC, etc)do  not significantly interfere with this assay.   However, presence of multiple variants may affect accuracy.     04/25/2018 5.8 (H) 4.0 - 5.6 % Final     Comment:     According to ADA guidelines, hemoglobin A1c <7.0% represents  optimal control in non-pregnant diabetic patients. Different  metrics may apply to specific patient populations.   Standards of Medical Care in Diabetes-2016.  For the purpose of screening for the presence of diabetes:  <5.7%     Consistent with the absence of diabetes  5.7-6.4%  Consistent with increasing risk for diabetes   (prediabetes)  >or=6.5%  Consistent with diabetes  Currently, no consensus exists for use of hemoglobin A1c  for diagnosis of diabetes for children.  This Hemoglobin A1c assay has significant interference with fetal   hemoglobin   (HbF). The results are invalid for patients with abnormal amounts of   HbF,   including those with known Hereditary Persistence   of Fetal Hemoglobin. Heterozygous hemoglobin variants (HbAS, HbAC,   HbAD, HbAE, HbA2) do not significantly interfere with this assay;   however, presence of multiple variants in a sample may impact the %   interference.     02/22/2018 4.8 4.0 - 5.6 % Final     Comment:     According to ADA guidelines, hemoglobin A1c  <7.0% represents  optimal control in non-pregnant diabetic patients. Different  metrics may apply to specific patient populations.   Standards of Medical Care in Diabetes-2016.  For the purpose of screening for the presence of diabetes:  <5.7%     Consistent with the absence of diabetes  5.7-6.4%  Consistent with increasing risk for diabetes   (prediabetes)  >or=6.5%  Consistent with diabetes  Currently, no consensus exists for use of hemoglobin A1c  for diagnosis of diabetes for children.  This Hemoglobin A1c assay has significant interference with fetal   hemoglobin   (HbF). The results are invalid for patients with abnormal amounts of   HbF,   including those with known Hereditary Persistence   of Fetal Hemoglobin. Heterozygous hemoglobin variants (HbAS, HbAC,   HbAD, HbAE, HbA2) do not significantly interfere with this assay;   however, presence of multiple variants in a sample may impact the %   interference.          Active Hospital Problems    Diagnosis  POA    *Uremia [N19]  Yes    ESRD (end stage renal disease) [N18.6]  Yes    Hypertensive urgency [I16.0]  Yes    Type 2 diabetes mellitus with hyperglycemia, without long-term current use of insulin [E11.65]  Yes    Hyperkalemia [E87.5]  Yes    Acute kidney failure [N17.9]  Yes    Fatigue [R53.83]  Yes    Essential hypertension [I10]  Yes    Anemia in stage 4 chronic kidney disease [N18.4, D63.1]  Yes    History of stroke [Z86.73]  Not Applicable     s/p R-MCA stroke with R-putaminal hemorrhagic transformation in 8/2016 and 11/2016 (s/p hemicraniotomy at [facility]) with residual L hemiparesis, on AED s/p CVA        Stage 4 chronic kidney disease [N18.4]  Yes      Resolved Hospital Problems   No resolved problems to display.         sodium chloride 0.9%   Intravenous Once    sodium chloride 0.9%   Intravenous Once    aspirin  81 mg Oral Daily    atorvastatin  40 mg Oral Daily    carvedilol  25 mg Oral BID    epoetin josh (PROCRIT) injection  5,000  Units Subcutaneous Every Mon, Wed, Fri    furosemide  40 mg Oral Daily    hydrALAZINE  100 mg Oral TID    levETIRAcetam  500 mg Oral Q8H    NIFEdipine  90 mg Oral QHS    polyethylene glycol  17 g Oral Daily    sodium bicarbonate  1,300 mg Oral BID    tuberculin  5 Units Intradermal Once     sodium chloride, sodium chloride 0.9%, sodium chloride 0.9%, sodium chloride 0.9%, sodium chloride 0.9%, sodium chloride 0.9%, acetaminophen, acetaminophen, albuterol-ipratropium, bisacodyl, bisacodyl, [COMPLETED] calcium gluconate IVPB **AND** calcium gluconate IVPB, dextrose 50%, dextrose 50%, glucagon (human recombinant), glucose, glucose, heparin (porcine), insulin aspart U-100, labetalol, ondansetron, promethazine (PHENERGAN) IVPB, sodium chloride 0.9%    Assessment and Plan for problems addressed today:   1. Uremia with ESRD  - with nausea, vomiting, fatigue, BUN higher than baseline, overall worsening renal function  - nephrology consulted, DOMONIQUE placed R permacath for HD, first HD session done 8/10/2018  - outpatient HD lab ordered. SW has been alerted for HD chair set up  - family had questions about home HD, discussed case with Nephrology fellow, at this time will need outpatient HD first then eventually can be transfer to home HD by her outpatient nephrologist if able at that time      2. Fatigue   - given uremia   - PT/OT     3. Hyperkalemia, resolved with HD   - 5.8 on admit with peaked T waves on EKG  - BMP in am       4. Type 2 diabetes mellitus with hyperglycemia, without long-term current use of insulin   - diet controlled, diabetic diet, low dose SSI  - A1c 5.6      5. Essential hypertension with Hypertensive Urgency at admission, improved  - Uncontrolled, multiple doses of IV hydralazine in ED unsuccessful  - continue home medications: coreg 25 mg bid (increased), lasix 40 mg daily, nifedipine 90 mg daily  - decreased hydralazine to 50mg TID  - given HD initiation, will monitor can D/C or decrease med if  needed     6. Anemia in stage 4 chronic kidney disease  - transfuse for hemoglobin less than 7  - EPO per nephrology  - on iron supplementation   - CBC in am       7. History of stroke   - left sided weakness, turn q2h, fall risk, aspiration risk  - continue ASA, statin, keppra          Diet: renal  DVT PPx: SCD  Code status: FULL    Discharge plan and follow up: outpatient HD chair approved for Monday, will plan for HD in am and discharge home afterwards with [facility]    Salud Portillo MD  Hospital Medicine Staff  [phone] pager

## 2018-08-16 NOTE — PLAN OF CARE
Covering SW spoke with pt's spouse Wilder to update regarding HD chair. Patient will begin dialysis on Monday, 8/20, at 3:15 PM at the [facility] location. Patient will have dialysis on Mondays, Wednesdays, and Fridays. Spouse voiced understanding.     Love Peña LMSW  [facility]

## 2018-08-16 NOTE — ASSESSMENT & PLAN NOTE
Now progressing towards stage V, etiology HTN/DM, now with worsening electrolytes and uremic symptoms (lethargy, nausea, vomiting and asterixis on exam), admitted for tentative RRT initiation.    Plan/Recommendations:  1) HD tentatively in the [facility] tomorrow  2) placement in dialysis unit prior to disca

## 2018-08-17 ENCOUNTER — OUTPATIENT CASE MANAGEMENT (OUTPATIENT)
Dept: ADMINISTRATIVE | Facility: OTHER | Age: 58
End: 2018-08-17

## 2018-08-17 VITALS
BODY MASS INDEX: 33.01 KG/M2 | WEIGHT: 186.31 LBS | HEIGHT: 63 IN | TEMPERATURE: 99 F | RESPIRATION RATE: 16 BRPM | SYSTOLIC BLOOD PRESSURE: 127 MMHG | OXYGEN SATURATION: 97 % | HEART RATE: 88 BPM | DIASTOLIC BLOOD PRESSURE: 61 MMHG

## 2018-08-17 PROBLEM — E87.5 HYPERKALEMIA: Status: RESOLVED | Noted: 2018-08-10 | Resolved: 2018-08-17

## 2018-08-17 PROBLEM — I16.0 HYPERTENSIVE URGENCY: Status: RESOLVED | Noted: 2018-08-10 | Resolved: 2018-08-17

## 2018-08-17 LAB
ABO + RH BLD: NORMAL
ALBUMIN SERPL BCP-MCNC: 3 G/DL
ANION GAP SERPL CALC-SCNC: 13 MMOL/L
BASOPHILS # BLD AUTO: 0.02 K/UL
BASOPHILS NFR BLD: 0.3 %
BLD GP AB SCN CELLS X3 SERPL QL: NORMAL
BLD PROD TYP BPU: NORMAL
BLD PROD TYP BPU: NORMAL
BLOOD UNIT EXPIRATION DATE: NORMAL
BLOOD UNIT EXPIRATION DATE: NORMAL
BLOOD UNIT TYPE CODE: 5100
BLOOD UNIT TYPE CODE: 5100
BLOOD UNIT TYPE: NORMAL
BLOOD UNIT TYPE: NORMAL
BUN SERPL-MCNC: 57 MG/DL
CALCIUM SERPL-MCNC: 8.4 MG/DL
CHLORIDE SERPL-SCNC: 107 MMOL/L
CO2 SERPL-SCNC: 20 MMOL/L
CODING SYSTEM: NORMAL
CODING SYSTEM: NORMAL
CREAT SERPL-MCNC: 4 MG/DL
DIFFERENTIAL METHOD: ABNORMAL
DISPENSE STATUS: NORMAL
DISPENSE STATUS: NORMAL
EOSINOPHIL # BLD AUTO: 0.3 K/UL
EOSINOPHIL NFR BLD: 5.6 %
ERYTHROCYTE [DISTWIDTH] IN BLOOD BY AUTOMATED COUNT: 12.4 %
EST. GFR  (AFRICAN AMERICAN): 13.3 ML/MIN/1.73 M^2
EST. GFR  (NON AFRICAN AMERICAN): 11.6 ML/MIN/1.73 M^2
GLUCOSE SERPL-MCNC: 157 MG/DL
HCT VFR BLD AUTO: 22.3 %
HGB BLD-MCNC: 6.8 G/DL
HGB BLD-MCNC: 8.3 G/DL
IMM GRANULOCYTES # BLD AUTO: 0.03 K/UL
IMM GRANULOCYTES NFR BLD AUTO: 0.5 %
LYMPHOCYTES # BLD AUTO: 1.2 K/UL
LYMPHOCYTES NFR BLD: 20 %
MCH RBC QN AUTO: 30.6 PG
MCHC RBC AUTO-ENTMCNC: 30.5 G/DL
MCV RBC AUTO: 101 FL
MONOCYTES # BLD AUTO: 1 K/UL
MONOCYTES NFR BLD: 16.3 %
NEUTROPHILS # BLD AUTO: 3.5 K/UL
NEUTROPHILS NFR BLD: 57.3 %
NRBC BLD-RTO: 0 /100 WBC
PHOSPHATE SERPL-MCNC: 5.1 MG/DL
PLATELET # BLD AUTO: 194 K/UL
PMV BLD AUTO: 10.2 FL
POCT GLUCOSE: 133 MG/DL (ref 70–110)
POTASSIUM SERPL-SCNC: 4.7 MMOL/L
RBC # BLD AUTO: 2.22 M/UL
SODIUM SERPL-SCNC: 140 MMOL/L
TRANS ERYTHROCYTES VOL PATIENT: NORMAL ML
TRANS ERYTHROCYTES VOL PATIENT: NORMAL ML
WBC # BLD AUTO: 6.06 K/UL

## 2018-08-17 PROCEDURE — 63600175 PHARM REV CODE 636 W HCPCS: Performed by: INTERNAL MEDICINE

## 2018-08-17 PROCEDURE — 80069 RENAL FUNCTION PANEL: CPT

## 2018-08-17 PROCEDURE — 25000003 PHARM REV CODE 250: Performed by: INTERNAL MEDICINE

## 2018-08-17 PROCEDURE — 86901 BLOOD TYPING SEROLOGIC RH(D): CPT

## 2018-08-17 PROCEDURE — 36415 COLL VENOUS BLD VENIPUNCTURE: CPT

## 2018-08-17 PROCEDURE — 63600175 PHARM REV CODE 636 W HCPCS: Mod: JG | Performed by: INTERNAL MEDICINE

## 2018-08-17 PROCEDURE — 85018 HEMOGLOBIN: CPT

## 2018-08-17 PROCEDURE — 25000003 PHARM REV CODE 250: Performed by: PHYSICIAN ASSISTANT

## 2018-08-17 PROCEDURE — 90935 HEMODIALYSIS ONE EVALUATION: CPT

## 2018-08-17 PROCEDURE — P9021 RED BLOOD CELLS UNIT: HCPCS

## 2018-08-17 PROCEDURE — 85025 COMPLETE CBC W/AUTO DIFF WBC: CPT

## 2018-08-17 PROCEDURE — 86920 COMPATIBILITY TEST SPIN: CPT

## 2018-08-17 PROCEDURE — 90935 HEMODIALYSIS ONE EVALUATION: CPT | Mod: ,,, | Performed by: INTERNAL MEDICINE

## 2018-08-17 PROCEDURE — 96372 THER/PROPH/DIAG INJ SC/IM: CPT

## 2018-08-17 PROCEDURE — 99239 HOSP IP/OBS DSCHRG MGMT >30: CPT | Mod: ,,, | Performed by: INTERNAL MEDICINE

## 2018-08-17 RX ORDER — MEPERIDINE HYDROCHLORIDE 50 MG/ML
12.5 INJECTION INTRAMUSCULAR; INTRAVENOUS; SUBCUTANEOUS ONCE
Status: COMPLETED | OUTPATIENT
Start: 2018-08-17 | End: 2018-08-17

## 2018-08-17 RX ORDER — HYDROCODONE BITARTRATE AND ACETAMINOPHEN 500; 5 MG/1; MG/1
TABLET ORAL
Status: DISCONTINUED | OUTPATIENT
Start: 2018-08-17 | End: 2018-08-18 | Stop reason: HOSPADM

## 2018-08-17 RX ORDER — HYDRALAZINE HYDROCHLORIDE 100 MG/1
100 TABLET, FILM COATED ORAL 3 TIMES DAILY
Qty: 30 TABLET | Refills: 0 | Status: SHIPPED | OUTPATIENT
Start: 2018-08-17 | End: 2018-10-17 | Stop reason: CLARIF

## 2018-08-17 RX ADMIN — SODIUM BICARBONATE 650 MG TABLET 1300 MG: at 11:08

## 2018-08-17 RX ADMIN — ATORVASTATIN CALCIUM 40 MG: 20 TABLET, FILM COATED ORAL at 11:08

## 2018-08-17 RX ADMIN — ASPIRIN 81 MG: 81 TABLET, COATED ORAL at 11:08

## 2018-08-17 RX ADMIN — LEVETIRACETAM 500 MG: 500 TABLET ORAL at 03:08

## 2018-08-17 RX ADMIN — CARVEDILOL 25 MG: 25 TABLET, FILM COATED ORAL at 11:08

## 2018-08-17 RX ADMIN — ERYTHROPOIETIN 10000 UNITS: 10000 INJECTION, SOLUTION INTRAVENOUS; SUBCUTANEOUS at 10:08

## 2018-08-17 RX ADMIN — ACETAMINOPHEN 650 MG: 325 TABLET, FILM COATED ORAL at 03:08

## 2018-08-17 RX ADMIN — ACETAMINOPHEN 650 MG: 325 TABLET, FILM COATED ORAL at 09:08

## 2018-08-17 RX ADMIN — SODIUM CHLORIDE 300 ML: 0.9 INJECTION, SOLUTION INTRAVENOUS at 09:08

## 2018-08-17 RX ADMIN — POLYETHYLENE GLYCOL 3350 17 G: 17 POWDER, FOR SOLUTION ORAL at 11:08

## 2018-08-17 RX ADMIN — FUROSEMIDE 40 MG: 40 TABLET ORAL at 11:08

## 2018-08-17 RX ADMIN — MEPERIDINE HYDROCHLORIDE 12.5 MG: 50 INJECTION, SOLUTION INTRAMUSCULAR; INTRAVENOUS; SUBCUTANEOUS at 12:08

## 2018-08-17 RX ADMIN — HYDRALAZINE HYDROCHLORIDE 100 MG: 50 TABLET ORAL at 11:08

## 2018-08-17 RX ADMIN — HEPARIN SODIUM 1000 UNITS: 1000 INJECTION, SOLUTION INTRAVENOUS; SUBCUTANEOUS at 10:08

## 2018-08-17 RX ADMIN — HYDRALAZINE HYDROCHLORIDE 100 MG: 50 TABLET ORAL at 03:08

## 2018-08-17 NOTE — DISCHARGE SUMMARY
Discharge Summary  Hospital Medicine    Attending Provider on Discharge: Salud Portillo MD  [place]ospital Medicine [place] [facility] [place] [facility] [place]  Date of Admission:  8/9/2018     Date of Discharge:  8/17/2018  Code status: Full Code    Active Hospital Problems    Diagnosis  POA    *Uremia [N19]  Yes    ESRD (end stage renal disease) [N18.6]  Yes    Type 2 diabetes mellitus with hyperglycemia, without long-term current use of insulin [E11.65]  Yes    Acute kidney failure [N17.9]  Yes    Fatigue [R53.83]  Yes    Essential hypertension [I10]  Yes    Anemia in stage 4 chronic kidney disease [N18.4, D63.1]  Yes    History of stroke [Z86.73]  Not Applicable     s/p R-MCA stroke with R-putaminal hemorrhagic transformation in 8/2016 and 11/2016 (s/p hemicraniotomy at [facility]) with residual L hemiparesis, on AED s/p CVA        Stage 4 chronic kidney disease [N18.4]  Yes      Resolved Hospital Problems    Diagnosis Date Resolved POA    Hypertensive urgency [I16.0] 08/17/2018 Yes    Hyperkalemia [E87.5] 08/17/2018 Yes        HPI  Lucy Perez is a 57F with diet controlled T2DM, HTN, CVA with left sided weakness, CKD4. She presents today with her  at the request of her nephrologist and PCP for initiation of HD. Today her home health RN noticed her with SOB, increased fatigue, emesis. The patient's PCP was notified and after discussion with nephrology it was recommended she present to the ED for initiation of dialysis. Patient's  provides most of the history, they are reluctant to start HD, but understand the need. Denies cough, fever, chills, dysuria, abdominal pain, eating well as of yesterday, but has not eaten today. She uses oxygen at home PRN -  states she uses it when her potassium is high and she is SOB.     Intake work up with worsening renal function and rise in BUN from baseline to 96, CXR clear, K+ 5.8 with t-wave changes on EKG.      Hospital Course  During admission, patient was started  on hemodialysis.  Nephrology was consulted.  She had a PermCath placed by Nephrology service.  She was tolerating HD without any significant side effects.  Her blood pressure medications were adjusted and blood pressure improved.  Hyperkalemia resolved with HD.  PT/OT evaluated and recommended home health care.  Patient also had home dialysis arranged.  Patient was discharged home with [facility] in stable condition.    1. Uremia with ESRD  - with nausea, vomiting, fatigue, BUN higher than baseline, overall worsening renal function  - nephrology consulted, DOMONIQUE placed R permacath for HD, first HD session done 8/10/2018  - outpatient HD lab ordered. SW arrange HD chair MWF  - family had questions about home HD, discussed case with Nephrology fellow, at this time will need outpatient HD first then eventually can be transfer to home HD by her outpatient nephrologist if able at that time      2. Fatigue   - given uremia   - PT/OT- HHC arranged     3. Hyperkalemia, resolved with HD   - 5.8 on admit with peaked T waves on EKG  - BMP in am      4. Type 2 diabetes mellitus with hyperglycemia, without long-term current use of insulin   - diet controlled, diabetic diet, low dose SSI  - A1c 5.6      5. Essential hypertension with Hypertensive Urgency at admission, improved  - Uncontrolled, multiple doses of IV hydralazine in ED unsuccessful  - continue home medications: coreg 25 mg bid (increased), lasix 40 mg daily, nifedipine 90 mg daily  - hydralazine 100mg TID     6. Anemia in stage 4 chronic kidney disease  - transfuse for hemoglobin less than 7  - EPO per nephrology  - on iron supplementation   - given 1 unit prior to discharge with improvement in Hgb     7. History of stroke   - left sided weakness, turn q2h, fall risk, aspiration risk  - continued ASA, statin, keppra       Recent Labs   Lab  08/15/18   0616  08/16/18   0417  08/17/18   0517  08/17/18   1721   WBC  4.73  5.27  6.06   --    HGB  7.6*  7.4*  6.8*  8.3*   HCT   24.5*  24.6*  22.3*   --    PLT  153  181  194   --      Recent Labs   Lab  08/15/18   0616  08/16/18   0417  08/17/18   0517   NA  140  139  140   K  4.3  4.5  4.7   CL  101  105  107   CO2  29  22*  20*   BUN  46*  34*  57*   CREATININE  2.9*  2.5*  4.0*   GLU  176*  154*  157*   CALCIUM  9.5  9.0  8.4*   PHOS  4.2  4.5  5.1*     Recent Labs   Lab  08/15/18   0616  08/16/18   0417  08/17/18   0517   ALBUMIN  3.3*  3.2*  3.0*      Recent Labs   Lab  08/15/18   1301  08/15/18   1906  08/16/18   0616  08/16/18   0905  08/16/18   1255  08/17/18   1112   POCTGLUCOSE  170*  145*  152*  144*  228*  133*     No results for input(s): CPK, CPKMB, MB, TROPONINI in the last 72 hours.    Imaging:  CXR  Impression       No detrimental change identified on this single view when compared with 07/23/2018.     HD cath placement    EKG  Normal sinus rhythm with sinus arrhythmia  Abnormal ECG  When compared with ECG of 23-JUL-2018 18:34,  No significant change was found  Confirmed by ANAYELI TORREZ MD ([id number]) on 8/10/2018 9:55:36 AM    Procedures: none    Consultants: nephrology    Current Discharge Medication List      START taking these medications    Details   acetaminophen (TYLENOL) 325 MG tablet Take 2 tablets (650 mg total) by mouth every 6 (six) hours as needed for Pain.  Refills: 0         CONTINUE these medications which have CHANGED    Details   carvedilol (COREG) 25 MG tablet Take 1 tablet (25 mg total) by mouth 2 (two) times daily.  Qty: 60 tablet, Refills: 11      hydrALAZINE (APRESOLINE) 100 MG tablet Take 1 tablet (100 mg total) by mouth 3 (three) times daily.  Qty: 30 tablet, Refills: 0         CONTINUE these medications which have NOT CHANGED    Details   albuterol (PROVENTIL) 2.5 mg /3 mL (0.083 %) nebulizer solution Take 3 mLs (2.5 mg total) by nebulization every 6 (six) hours as needed for Wheezing. Rescue  Qty: 25 each, Refills: 3    Associated Diagnoses: Chest congestion      aspirin (ECOTRIN) 81 MG EC tablet  "Take 81 mg by mouth once daily.        atorvastatin (LIPITOR) 40 MG tablet TAKE 1 TABLET ONE TIME DAILY FOR CHOLESTEROL  Qty: 90 tablet, Refills: 2    Associated Diagnoses: Pure hypercholesterolemia      BD INSULIN PEN NEEDLE UF SHORT 31 gauge x 5/16" Ndle USE TO INJECT NOVOLOG FLEXPEN BEFORE MEALS  Qty: 150 each, Refills: 11      blood sugar diagnostic (ACCU-CHEK SMARTVIEW TEST STRIP) Strp 1 strip by Misc.(Non-Drug; Combo Route) route 2 (two) times daily.  Qty: 300 each, Refills: 3    Associated Diagnoses: Type 2 diabetes mellitus with chronic kidney disease      ergocalciferol (ERGOCALCIFEROL) 50,000 unit Cap 1 capsule (50,000 Units total) by Per G Tube route every 7 days.  Qty: 12 capsule, Refills: 2      famotidine (PEPCID) 20 MG tablet Take 1 tablet (20 mg total) by mouth once daily.  Qty: 90 tablet, Refills: 2    Associated Diagnoses: Hemorrhagic cerebrovascular accident (CVA)      ferrous sulfate 220 mg (44 mg iron)/5 mL solution 10ml daily for Iron/Give via PEG  Qty: 300 mL, Refills: 6    Associated Diagnoses: Anemia, unspecified type      folic acid (FOLVITE) 1 MG tablet Take 1 tablet (1 mg total) by mouth once daily.  Qty: 90 tablet, Refills: 2    Associated Diagnoses: Folate deficiency      furosemide (LASIX) 40 MG tablet Take 1 tablet (40 mg total) by mouth once daily.  Qty: 90 tablet, Refills: 2      levETIRAcetam (KEPPRA) 500 MG Tab Take 1 tablet (500 mg total) by mouth every 8 (eight) hours.  Qty: 270 tablet, Refills: 2    Associated Diagnoses: Hemorrhagic cerebrovascular accident (CVA)      NIFEdipine (ADALAT CC) 90 MG TbSR Take 1 tablet (90 mg total) by mouth once daily.  Qty: 90 tablet, Refills: 2      pregabalin (LYRICA) 25 MG capsule 1 cap in AM and 2 caps at Bedtime for muscle spasm  Qty: 270 capsule, Refills: 2    Associated Diagnoses: Muscle spasticity      traZODone (DESYREL) 50 MG tablet Take 1 tablet (50 mg total) by mouth every evening.  Qty: 90 tablet, Refills: 1      sodium bicarbonate " 650 MG tablet Take 2 tablets (1,300 mg total) by mouth 2 (two) times daily.  Qty: 360 tablet, Refills: 2             Discharge Diet:renal diet with Normal Fluid intake of 1500 - 2000 mL per day    Activity: activity as tolerated    Discharge Condition: Stable    Disposition: Home-Health Care Svc    Tests pending at the time of discharge: none      Time spent  on the discharge of the patient including review of hospital course with the patient. reviewing discharge medications and arranging follow-up care: 45 mins    Discharge examination Patient was seen and examined on the date of discharge and determined to be suitable for discharge.  Vitals:    08/17/18 1642   BP: 127/61   Pulse: 77   Resp: 16   Temp: 99.4 °F (37.4 °C)     Constitutional: NAD, Appears well-developed and well-nourished. Chronically ill, temporal wasting   Mental status: Alert and oriented to person, place, and time.  Head: Normocephalic and atraumatic.   Mouth/Throat: Oropharynx is clear and moist.   Eyes: EOM are normal. PERRL, No scleral icterus.   Neck: Normal range of motion. Neck supple. +HD cath in right neck  Cardiovascular: RRR, S1, S2 normal, +KIERSTEN grade 2/6, no click, rub or gallop  Pulmonary/Chest: Effort normal, CTAB. No respiratory distress. No wheezes, rales, or rhonchi, +O2 via NC (chronic)  Abdominal: Soft, NT, ND. +BS, no masses, no organomegaly  Musculoskeletal: Normal range of motion.  Ext: no c/c/e  Neurological: L sided weakness, chronic from CVA  Skin: Skin is warm and dry. No rashes or lesions  Psychiatric: Depressed mood. Behavior is normal.       Discharge plan and follow up:  Follow up with PCP in 1-2 weeks  Follow up with kidney doctor at HD on Monday     Future Appointments   Date Time Provider Department Center   8/31/2018  9:00 AM LAB, MONET [facility] LAB [place]   9/5/2018 11:00 AM Beverly Muniz MD [facility] ODALYS [place]   9/17/2018  9:00 AM LAB, HEMONC SAME DAY [facility] LAB [place]   9/17/2018 10:00 AM  Mike Owen MD [facility] BM [place] Mario Burks   9/17/2018 10:45 AM INJECTION, NOM INFUSION NOM CHEMO Mario Portillo MD  Hospital Medicine Staff  [phone] pager

## 2018-08-17 NOTE — PLAN OF CARE
Problem: Patient Care Overview  Goal: Plan of Care Review  Outcome: Ongoing (interventions implemented as appropriate)  Reviewed POC with nsg interventions. Pt and spouse verbalized understanding of POC.

## 2018-08-17 NOTE — PROGRESS NOTES
[facility]  Nephrology  Progress Note    Patient Name: Lucy Perez  MRN: [id number]  Admission Date: 8/9/2018  Hospital Length of Stay: 7 days  Attending Provider: Salud Portillo MD   Primary Care Physician: Beverly Muniz MD  Principal Problem:Uremia    Subjective:     HPI: 56 yo AAF with diet controlled T2DM, HTN, CVA with left sided weakness, CKD4 (HTN and T2DM). Has been experiencing increasing fatigue, tiredness, nausea, episodes of vomiting.  She has resisted dialysis discussion with her nephrologist, however her labs and CKD has been progressing rapidly over the past year, labs were checked yesterday and note with Bun:Cr of 96:3.6, after discussion with her PCP and nephrologist (Dr Blank) it was decided she be admitted for HD initiation, she unfortunately does not have access.       Interval History: seen on HD in the [facility], tolerating BF of 400 with a UF of 2L, arterial and venous pressures are in the mid 100s    Review of patient's allergies indicates:   Allergen Reactions    Lisinopril Other (See Comments)     Angioedema      Vicodin [hydrocodone-acetaminophen] Rash     Current Facility-Administered Medications   Medication Frequency    0.9%  NaCl infusion (for blood administration) Q24H PRN    0.9%  NaCl infusion (for blood administration) Q24H PRN    0.9%  NaCl infusion PRN    0.9%  NaCl infusion Once    0.9%  NaCl infusion PRN    0.9%  NaCl infusion PRN    0.9%  NaCl infusion PRN    0.9%  NaCl infusion PRN    0.9%  NaCl infusion Once    acetaminophen tablet 650 mg Q4H PRN    acetaminophen tablet 650 mg Q8H PRN    albuterol-ipratropium 2.5 mg-0.5 mg/3 mL nebulizer solution 3 mL Q4H PRN    aspirin EC tablet 81 mg Daily    atorvastatin tablet 40 mg Daily    bisacodyl EC tablet 5 mg Daily PRN    bisacodyl suppository 10 mg Daily PRN    calcium gluconate 1 g in dextrose 5 % 100 mL IVPB Q10 Min PRN    carvedilol tablet 25 mg BID    dextrose 50% injection  12.5 g PRN    dextrose 50% injection 25 g PRN    [START ON 8/20/2018] epoetin josh (PROCRIT) injection 5,000 units kit Every Mon, Wed, Fri    epoetin josh injection 10,000 Units Once    furosemide tablet 40 mg Daily    glucagon (human recombinant) injection 1 mg PRN    glucose chewable tablet 16 g PRN    glucose chewable tablet 24 g PRN    heparin (porcine) injection 1,000 Units PRN    hydrALAZINE tablet 100 mg TID    insulin aspart U-100 pen 0-5 Units QID (AC + HS) PRN    labetalol injection 20 mg Q4H PRN    levETIRAcetam tablet 500 mg Q8H    NIFEdipine 24 hr tablet 90 mg QHS    ondansetron disintegrating tablet 8 mg Q8H PRN    polyethylene glycol packet 17 g Daily    promethazine (PHENERGAN) 25 mg in dextrose 5 % 50 mL IVPB Q6H PRN    sodium bicarbonate tablet 1,300 mg BID    sodium chloride 0.9% flush 5 mL PRN    tuberculin injection 5 Units Once       Objective:     Vital Signs (Most Recent):  Temp: 97.3 °F (36.3 °C) (08/17/18 0645)  Pulse: 67 (08/17/18 0900)  Resp: 18 (08/17/18 0645)  BP: (!) 160/80 (08/17/18 0900)  SpO2: 99 % (08/17/18 0326)  O2 Device (Oxygen Therapy): nasal cannula (08/17/18 0645) Vital Signs (24h Range):  Temp:  [97.3 °F (36.3 °C)-98 °F (36.7 °C)] 97.3 °F (36.3 °C)  Pulse:  [66-77] 67  Resp:  [18-20] 18  SpO2:  [92 %-100 %] 99 %  BP: (100-169)/(52-81) 160/80     Weight: 84.5 kg (186 lb 4.6 oz) (08/16/18 0400)  Body mass index is 33 kg/m².  Body surface area is 1.94 meters squared.    I/O last 3 completed shifts:  In: 480 [P.O.:480]  Out: -     Physical Exam   HENT:   Head: Atraumatic.   Neck: No JVD present.   Cardiovascular: Normal rate and regular rhythm.   Pulmonary/Chest: Effort normal and breath sounds normal.   Abdominal: Soft. She exhibits no distension.   Musculoskeletal: She exhibits no edema or tenderness.   Neurological: She is alert.   Skin: Skin is warm.         Assessment/Plan:     Anemia in stage 4 chronic kidney disease    Significant drop over the weekend  (9/7 --> 6.4) and iron studies from 8/7 indicate patient is not iron deficient, could have lost some blood during perm cath placement, but very unlikely it would be a drop to this extent, also possibly Hgb > 9 on admit and a day after not accurate, there is no report of other bleeding either, we ordered epo SC, but review of MAR, indicate it was never given, uncertain why, cannot find documentation, will change order to IV and give on dialysis today, starting with 10,000 untis, then 5,000 on days of HD (MWF)        Stage 4 chronic kidney disease    Now progressing towards stage V, etiology HTN/DM, now with worsening electrolytes and uremic symptoms (lethargy, nausea, vomiting and asterixis on exam), admitted for tentative RRT initiation.    Plan/Recommendations:  1) HD in the [facility] today (UF 2L, F160, baths per protocol)  2) dialysis unit placement prior discharge            Thank you for your consult. I will follow-up with patient. Please contact us if you have any additional questions.    Dallas Cordero MD  Nephrology  [facility]    ATTENDING PHYSICIAN ATTESTATION  I have personally interviewed and examined the patient. I thoroughly reviewed the demographic, clinical, laboratorial and imaging information available in medical records. I agree with the assessment and recommendations provided by the subspecialty resident. Dr. Cordero was under my supervision.     HEMODIALYSIS NOTE  Patient evaluated while undergoing hemodialysis indicated for ESRD. Tolerating session with current UFR, no complications.

## 2018-08-17 NOTE — PT/OT/SLP PROGRESS
Physical Therapy      Patient Name:  Lucy Perez   MRN:  [id number]    PT orders acknowledged and PT evaluation attempted. In AM pt off the floor for HD then in PM pt receiving blood. Will re-attempt PT evaluation when next scheduled.     TERRY CHRIS, PT   8/17/2018

## 2018-08-17 NOTE — PROGRESS NOTES
3hr HD treatment completed 2L of fluid removed pt tolerated well. Epoetin given as ordered. Both lumens of a R chestwall permcath instilled with 1.6cc of Heparin, capped and taped. Report given to KARINA Hopkins.

## 2018-08-17 NOTE — ASSESSMENT & PLAN NOTE
Significant drop over the weekend (9/7 --> 6.4) and iron studies from 8/7 indicate patient is not iron deficient, could have lost some blood during perm cath placement, but very unlikely it would be a drop to this extent, also possibly Hgb > 9 on admit and a day after not accurate, there is no report of other bleeding either, we ordered epo SC, but review of MAR, indicate it was never given, uncertain why, cannot find documentation, will change order to IV and give on dialysis today, starting with 10,000 untis, then 5,000 on days of HD (MWF)

## 2018-08-17 NOTE — PROGRESS NOTES
8-    Pt remains in-patient in the hospital to date - Please refer to Outpatient Care Management for future needs if identified post hospital discharge.  Case closed    Conchita ([place]) Dillon RN OPCM

## 2018-08-17 NOTE — ASSESSMENT & PLAN NOTE
Now progressing towards stage V, etiology HTN/DM, now with worsening electrolytes and uremic symptoms (lethargy, nausea, vomiting and asterixis on exam), admitted for tentative RRT initiation.    Plan/Recommendations:  1) HD in the [facility] today (UF 2L, F160, baths per protocol)  2) dialysis unit placement prior discharge

## 2018-08-17 NOTE — SUBJECTIVE & OBJECTIVE
Interval History: seen on HD in the [facility], tolerating BF of 400 with a UF of 2L, arterial and venous pressures are in the mid 100s    Review of patient's allergies indicates:   Allergen Reactions    Lisinopril Other (See Comments)     Angioedema      Vicodin [hydrocodone-acetaminophen] Rash     Current Facility-Administered Medications   Medication Frequency    0.9%  NaCl infusion (for blood administration) Q24H PRN    0.9%  NaCl infusion (for blood administration) Q24H PRN    0.9%  NaCl infusion PRN    0.9%  NaCl infusion Once    0.9%  NaCl infusion PRN    0.9%  NaCl infusion PRN    0.9%  NaCl infusion PRN    0.9%  NaCl infusion PRN    0.9%  NaCl infusion Once    acetaminophen tablet 650 mg Q4H PRN    acetaminophen tablet 650 mg Q8H PRN    albuterol-ipratropium 2.5 mg-0.5 mg/3 mL nebulizer solution 3 mL Q4H PRN    aspirin EC tablet 81 mg Daily    atorvastatin tablet 40 mg Daily    bisacodyl EC tablet 5 mg Daily PRN    bisacodyl suppository 10 mg Daily PRN    calcium gluconate 1 g in dextrose 5 % 100 mL IVPB Q10 Min PRN    carvedilol tablet 25 mg BID    dextrose 50% injection 12.5 g PRN    dextrose 50% injection 25 g PRN    [START ON 8/20/2018] epoetin josh (PROCRIT) injection 5,000 units kit Every Mon, Wed, Fri    epoetin josh injection 10,000 Units Once    furosemide tablet 40 mg Daily    glucagon (human recombinant) injection 1 mg PRN    glucose chewable tablet 16 g PRN    glucose chewable tablet 24 g PRN    heparin (porcine) injection 1,000 Units PRN    hydrALAZINE tablet 100 mg TID    insulin aspart U-100 pen 0-5 Units QID (AC + HS) PRN    labetalol injection 20 mg Q4H PRN    levETIRAcetam tablet 500 mg Q8H    NIFEdipine 24 hr tablet 90 mg QHS    ondansetron disintegrating tablet 8 mg Q8H PRN    polyethylene glycol packet 17 g Daily    promethazine (PHENERGAN) 25 mg in dextrose 5 % 50 mL IVPB Q6H PRN    sodium bicarbonate tablet 1,300 mg BID    sodium chloride 0.9% flush 5  mL PRN    tuberculin injection 5 Units Once       Objective:     Vital Signs (Most Recent):  Temp: 97.3 °F (36.3 °C) (08/17/18 0645)  Pulse: 67 (08/17/18 0900)  Resp: 18 (08/17/18 0645)  BP: (!) 160/80 (08/17/18 0900)  SpO2: 99 % (08/17/18 0326)  O2 Device (Oxygen Therapy): nasal cannula (08/17/18 0645) Vital Signs (24h Range):  Temp:  [97.3 °F (36.3 °C)-98 °F (36.7 °C)] 97.3 °F (36.3 °C)  Pulse:  [66-77] 67  Resp:  [18-20] 18  SpO2:  [92 %-100 %] 99 %  BP: (100-169)/(52-81) 160/80     Weight: 84.5 kg (186 lb 4.6 oz) (08/16/18 0400)  Body mass index is 33 kg/m².  Body surface area is 1.94 meters squared.    I/O last 3 completed shifts:  In: 480 [P.O.:480]  Out: -     Physical Exam   HENT:   Head: Atraumatic.   Neck: No JVD present.   Cardiovascular: Normal rate and regular rhythm.   Pulmonary/Chest: Effort normal and breath sounds normal.   Abdominal: Soft. She exhibits no distension.   Musculoskeletal: She exhibits no edema or tenderness.   Neurological: She is alert.   Skin: Skin is warm.

## 2018-08-17 NOTE — PROGRESS NOTES
Report received from KARINA Marks. Pt arrived from floor AAOX4. Acute dialysis initiated via R chestwall percath without difficulty.

## 2018-08-17 NOTE — PLAN OF CARE
Covering SW provided pt and spouse with [facility] confirmation sheet and reiterated chair date and time. They both voiced understanding. SW informed [facility] that patient is being discharged today.    Love Peña, NICOLE  [facility]

## 2018-08-18 NOTE — PLAN OF CARE
Problem: Fall Risk (Adult)  Goal: Absence of Falls  Patient will demonstrate the desired outcomes by discharge/transition of care.   Outcome: Outcome(s) achieved Date Met: 08/17/18 08/17/18 2010   Fall Risk (Adult)   Absence of Falls achieves outcome       Problem: Patient Care Overview  Goal: Plan of Care Review  Outcome: Outcome(s) achieved Date Met: 08/17/18  Questions answered and concerns addressed.   08/17/18 2010   Coping/Psychosocial   Plan Of Care Reviewed With patient       Problem: Infection, Risk/Actual (Adult)  Goal: Infection Prevention/Resolution  Patient will demonstrate the desired outcomes by discharge/transition of care.   Outcome: Outcome(s) achieved Date Met: 08/17/18 08/17/18 2010   Infection, Risk/Actual (Adult)   Infection Prevention/Resolution achieves outcome       Problem: Pressure Ulcer Risk (Roosevelt Scale) (Adult,Obstetrics,Pediatric)  Goal: Skin Integrity  Patient will demonstrate the desired outcomes by discharge/transition of care.   Outcome: Outcome(s) achieved Date Met: 08/17/18 08/17/18 2010   Pressure Ulcer Risk (Roosevelt Scale) (Adult,Obstetrics,Pediatric)   Skin Integrity achieves outcome

## 2018-08-20 ENCOUNTER — TELEPHONE (OUTPATIENT)
Dept: INTERNAL MEDICINE | Facility: CLINIC | Age: 58
End: 2018-08-20

## 2018-08-20 ENCOUNTER — PATIENT OUTREACH (OUTPATIENT)
Dept: ADMINISTRATIVE | Facility: CLINIC | Age: 58
End: 2018-08-20

## 2018-08-20 DIAGNOSIS — D64.9 ANEMIA, UNSPECIFIED TYPE: Primary | ICD-10-CM

## 2018-08-20 NOTE — PATIENT INSTRUCTIONS
Hemodialysis    The job of the kidneys is to remove waste and extra water from the body. The kidneys also balance levels of minerals in the body, called electrolytes. Kidneys are essential for the health of the body. People with severe kidney disease are not able to perform these functions. This may be due to a temporary or a permanent kidney condition. Hemodialysis is a treatment that takes over the essential functions of the kidney until you recover from kidney disease, or get a kidney transplant. If you have end stage renal disease and are not eligible for a transplant, you will need to have hemodialysis for the rest of your life. Peritoneal dialysis is another type of dialysis but is not covered in this article.  Hemodialysis requires access to a strong blood flow. There are 3 ways to do this.   · Dialysis catheter. This is a plastic tube put into a large blood vessel, usually for temporary access. If there is no other option, it may be used permanently.  · AV fistula. Through a minor surgical procedure, an artery in your arm is joined directly to a vein. This creates an arteriovenous fistula or AV fistula. The pressure of the arterial blood flow slowly expands the size of the attached vein. It may take up to 4 to 6 weeks for the fistula to enlarge enough to be ready for dialysis. The AV fistula is the access of choice. This is because there are typically fewer problems and side effects. It also lasts longer than the other options.  · AV graft. This is an artificial implant that joins an artery and vein in people with small veins. It can also be used when an AV fistula did not work. It can be used for dialysis a few weeks after the surgery.  A connecting tube carries blood from the access point in the body to the dialysis machine where special filters called dialyzers filter and process the blood. The blood is then returned to the body through a separate tube and needle. This takes 3 to 4 hours and is usually  done 3 times a week. Home dialysis is an option for some patients.  Hemodialysis is lifesaving for people with kidney failure, but there are possible side effects. These include infection, low blood pressure, bleeding, electrolyte imbalance, anemia, and heart disease.  Home care  The following guidelines will help you care for yourself at home:  · Take any medicines as directed.  · Follow the special diet for kidney failure that your healthcare provider gave you.  · Dont wear any clothing or jewelry that could put pressure on the access site.  · Dont lie on the access site while sleeping.  · If the access is in your arm, have blood pressure readings and blood samples taken from the other arm.  · Check the access site after dialysis for swelling, bleeding, or signs of infection.  · If you have an AV fistula or graft, check the site regularly to be sure you can always feel the vibration (called the thrill) of blood flowing from artery to vein. Dont put lotions or other products on the access site.  · If you have an external dialysis catheter, avoid physical activities that could pull on the catheter.  Follow-up care  Follow up with your healthcare provider, or as advised.  Call 911  Call 911 if any of these occur:  · External catheter starts bleeding or opens to air  · Severe weakness, dizziness, fainting, drowsiness, or confusion  · Chest pain or shortness of breath  When to seek medical advice  Call your healthcare provider right away if any of these occur:  · Color of the blood in the external tubing changes from bright red to dark red  · You dont feel the thrill (vibration) in an AV fistula or graft  · Nausea or vomiting  · Unexpected weight gain or swelling in the legs, ankles, or around the eyes  · Decreased or absent urine output if you previously made urine  · Fever of 100.4ºF (38ºC) or higher, or as directed by your healthcare provider  Date Last Reviewed: 10/1/2016  © 5273-8731 [employer] [address] [place] [address]. All rights reserved. This information is not intended as a substitute for professional medical care. Always follow your healthcare professional's instructions.

## 2018-08-20 NOTE — PLAN OF CARE
Per chart notes, pt was dc late 8/17  See msw notes  Future Appointments   Date Time Provider Department [place]   8/31/2018  9:00 AM LAB, [facility] LAB [place]   9/5/2018 11:00 AM Beverly Muniz MD [facility] [place]   9/17/2018  9:00 AM LAB, [facility] SAME DAY [facility] [place]   9/17/2018 10:00 AM Mike Owen MD [facility] [place]   9/17/2018 10:45 AM INJECTION, [facility] [place]        08/20/18 0755   Final Note   Assessment Type Final Discharge Note   Discharge Disposition Home-Health   Hospital Follow Up  Appt(s) scheduled? Yes   Discharge plans and expectations educations in teach back method with documentation complete? Yes   Right Care Referral Info   Post Acute Recommendation Home-care

## 2018-08-20 NOTE — TELEPHONE ENCOUNTER
Ernestina Cuellar Staff   Caller: Reina/Daughter/ [phone] (Today,  1:27 PM)             Pt's daughter is calling to speak with someone in the office. Pt's daughter states that pt was released from the hospital on Friday 08/17 and needs to know if she can get an appt scheduled for the pt. Pt was told that she needed to schedule an appt for a week within being released from there hospital. Please advise.

## 2018-08-20 NOTE — TELEPHONE ENCOUNTER
KARINA Jimenez Staff 1 hour ago (1:07 PM)      Please forward this important TCC information to your provider in order to maximize the post discharge care delivery of this patient.     C3 nurse spoke with Lucy Perez  for a TCC post hospital discharge follow up call. The patient does not have a scheduled HOSFU appointment with Beverly Muniz MD  within 7 days post hospital discharge date 8\17. C3 nurse was unable to schedule HOSFU appointment in [facility].   Please contact patient and schedule follow up appointment using HOSFU visit type on or before 8\24.     Respectfully,   Paige Riley RN   [facility]     [email or web address]       Please do not reply to this message, as this inbox is not routinely monitored. (Routing comment)

## 2018-08-20 NOTE — TELEPHONE ENCOUNTER
Denise, pt is on dialysis now so is being monitored.  I have appt with pt 9/5 with prior lab,but I have nothing sooner.  I recommend we do repeat lab through [facility] this week(CBC and BMP) and have her keep 9/5 appt. Please call and let [facility] know to do lab in next day or two.  Thanks

## 2018-08-21 ENCOUNTER — TELEPHONE (OUTPATIENT)
Dept: INTERNAL MEDICINE | Facility: CLINIC | Age: 58
End: 2018-08-21

## 2018-08-21 DIAGNOSIS — N39.0 URINARY TRACT INFECTION WITHOUT HEMATURIA, SITE UNSPECIFIED: Primary | ICD-10-CM

## 2018-08-21 RX ORDER — CYCLOBENZAPRINE HCL 5 MG
TABLET ORAL
Qty: 30 TABLET | Refills: 0 | Status: SHIPPED | OUTPATIENT
Start: 2018-08-21 | End: 2019-01-10

## 2018-08-21 NOTE — TELEPHONE ENCOUNTER
Spoke with Reina ruiz her Mom's c/o back pain.  Sx seem consistent with muscle spasms.  She has no F/C.  She has had no relief with Tylenol. I have recommended/erx'd in: Flexeril 5mg prn and if no better, Reina to call. 'Will also check cath U/A.  Denise, please call [facility] to do a home Cath U/A and Cx on Ms Ayala when she can.  I've placed orders. Thanks

## 2018-08-24 ENCOUNTER — TELEPHONE (OUTPATIENT)
Dept: VASCULAR SURGERY | Facility: CLINIC | Age: 58
End: 2018-08-24

## 2018-08-24 ENCOUNTER — TELEPHONE (OUTPATIENT)
Dept: INTERNAL MEDICINE | Facility: CLINIC | Age: 58
End: 2018-08-24

## 2018-08-24 DIAGNOSIS — Z99.2 ESRD (END STAGE RENAL DISEASE) ON DIALYSIS: Primary | ICD-10-CM

## 2018-08-24 DIAGNOSIS — N18.6 ESRD (END STAGE RENAL DISEASE) ON DIALYSIS: Primary | ICD-10-CM

## 2018-08-24 DIAGNOSIS — Z01.818 PRE-OP EVALUATION: ICD-10-CM

## 2018-08-24 NOTE — TELEPHONE ENCOUNTER
Attempted to contact patient to schedule appt for HD access placement. Unable to reach patient. Voice message left for patient. Will reattempt. ----- Message from Joanne Cedeño sent at 8/24/2018 12:25 PM CDT -----  Contact: Pt can be reached at [phone]  Keira is calling to get pt scheduled for  Permanent access for dialysis please  From Monty Delgado can be reached at  [phone]    Thank you!

## 2018-08-24 NOTE — TELEPHONE ENCOUNTER
Ivelisse, please call pt's daughter, Reina to schedule her Mom for an UC Appt at [facility] tomorrow for back pain, which is no better.  Thanks

## 2018-08-24 NOTE — TELEPHONE ENCOUNTER
Florencio Cuellar Staff   Caller: [facility] [phone] (Yesterday,  2:32 PM)             The patient has excruciating pain in her back.     Please call and advise      Spoke to pt's nurse Francheska and she states pt was in pain yesterday lower back. Please advise

## 2018-08-25 ENCOUNTER — HOSPITAL ENCOUNTER (EMERGENCY)
Facility: HOSPITAL | Age: 58
Discharge: HOME | End: 2018-08-25
Attending: EMERGENCY MEDICINE
Payer: MEDICARE

## 2018-08-25 ENCOUNTER — OFFICE VISIT (OUTPATIENT)
Dept: INTERNAL MEDICINE | Facility: CLINIC | Age: 58
End: 2018-08-25
Payer: MEDICARE

## 2018-08-25 VITALS
DIASTOLIC BLOOD PRESSURE: 70 MMHG | HEIGHT: 63 IN | HEART RATE: 67 BPM | RESPIRATION RATE: 20 BRPM | BODY MASS INDEX: 32.96 KG/M2 | WEIGHT: 186 LBS | SYSTOLIC BLOOD PRESSURE: 147 MMHG | OXYGEN SATURATION: 97 % | TEMPERATURE: 99 F

## 2018-08-25 DIAGNOSIS — R10.11 RUQ PAIN: ICD-10-CM

## 2018-08-25 DIAGNOSIS — G89.29 CHRONIC RIGHT-SIDED LOW BACK PAIN WITH RIGHT-SIDED SCIATICA: Primary | ICD-10-CM

## 2018-08-25 DIAGNOSIS — R10.9 ABDOMINAL PAIN, UNSPECIFIED ABDOMINAL LOCATION: Primary | ICD-10-CM

## 2018-08-25 DIAGNOSIS — K80.20 CALCULUS OF GALLBLADDER WITHOUT CHOLECYSTITIS WITHOUT OBSTRUCTION: ICD-10-CM

## 2018-08-25 DIAGNOSIS — M54.41 CHRONIC RIGHT-SIDED LOW BACK PAIN WITH RIGHT-SIDED SCIATICA: Primary | ICD-10-CM

## 2018-08-25 LAB
ALBUMIN SERPL BCP-MCNC: 3.2 G/DL
ALP SERPL-CCNC: 73 U/L
ALT SERPL W/O P-5'-P-CCNC: 14 U/L
AMORPH CRY UR QL COMP ASSIST: ABNORMAL
ANION GAP SERPL CALC-SCNC: 12 MMOL/L
AST SERPL-CCNC: 17 U/L
BACTERIA #/AREA URNS AUTO: ABNORMAL /HPF
BASOPHILS # BLD AUTO: 0.07 K/UL
BASOPHILS NFR BLD: 1 %
BILIRUB SERPL-MCNC: 0.6 MG/DL
BILIRUB UR QL STRIP: NEGATIVE
BUN SERPL-MCNC: 29 MG/DL
CALCIUM SERPL-MCNC: 9.3 MG/DL
CHLORIDE SERPL-SCNC: 98 MMOL/L
CLARITY UR REFRACT.AUTO: ABNORMAL
CO2 SERPL-SCNC: 30 MMOL/L
COLOR UR AUTO: ABNORMAL
CREAT SERPL-MCNC: 3.1 MG/DL
DIFFERENTIAL METHOD: ABNORMAL
EOSINOPHIL # BLD AUTO: 0.3 K/UL
EOSINOPHIL NFR BLD: 4.3 %
ERYTHROCYTE [DISTWIDTH] IN BLOOD BY AUTOMATED COUNT: 12.8 %
EST. GFR  (AFRICAN AMERICAN): 18 ML/MIN/1.73 M^2
EST. GFR  (NON AFRICAN AMERICAN): 15.6 ML/MIN/1.73 M^2
GLUCOSE SERPL-MCNC: 223 MG/DL
GLUCOSE UR QL STRIP: NEGATIVE
GRAN CASTS UR QL COMP ASSIST: 1 /LPF
HCT VFR BLD AUTO: 27.8 %
HGB BLD-MCNC: 8.7 G/DL
HGB UR QL STRIP: NEGATIVE
HYALINE CASTS UR QL AUTO: 0 /LPF
IMM GRANULOCYTES # BLD AUTO: 0.03 K/UL
IMM GRANULOCYTES NFR BLD AUTO: 0.4 %
KETONES UR QL STRIP: NEGATIVE
LEUKOCYTE ESTERASE UR QL STRIP: ABNORMAL
LYMPHOCYTES # BLD AUTO: 1.1 K/UL
LYMPHOCYTES NFR BLD: 14.8 %
MAGNESIUM SERPL-MCNC: 2 MG/DL
MCH RBC QN AUTO: 30.5 PG
MCHC RBC AUTO-ENTMCNC: 31.3 G/DL
MCV RBC AUTO: 98 FL
MICROSCOPIC COMMENT: ABNORMAL
MONOCYTES # BLD AUTO: 0.7 K/UL
MONOCYTES NFR BLD: 9.7 %
NEUTROPHILS # BLD AUTO: 5 K/UL
NEUTROPHILS NFR BLD: 69.8 %
NITRITE UR QL STRIP: NEGATIVE
NON-SQ EPI CELLS #/AREA URNS AUTO: 1 /HPF
NRBC BLD-RTO: 0 /100 WBC
PH UR STRIP: 5 [PH] (ref 5–8)
PHOSPHATE SERPL-MCNC: 4.7 MG/DL
PLATELET # BLD AUTO: 292 K/UL
PMV BLD AUTO: 9.8 FL
POTASSIUM SERPL-SCNC: 4.6 MMOL/L
PROT SERPL-MCNC: 7.3 G/DL
PROT UR QL STRIP: ABNORMAL
RBC # BLD AUTO: 2.85 M/UL
RBC #/AREA URNS AUTO: 1 /HPF (ref 0–4)
SODIUM SERPL-SCNC: 140 MMOL/L
SP GR UR STRIP: 1.01 (ref 1–1.03)
SQUAMOUS #/AREA URNS AUTO: 7 /HPF
URN SPEC COLLECT METH UR: ABNORMAL
UROBILINOGEN UR STRIP-ACNC: NEGATIVE EU/DL
WAXY CASTS UR QL COMP ASSIST: <1 /LPF
WBC # BLD AUTO: 7.18 K/UL
WBC #/AREA URNS AUTO: 10 /HPF (ref 0–5)

## 2018-08-25 PROCEDURE — 99284 EMERGENCY DEPT VISIT MOD MDM: CPT | Mod: 25

## 2018-08-25 PROCEDURE — 80053 COMPREHEN METABOLIC PANEL: CPT

## 2018-08-25 PROCEDURE — 83735 ASSAY OF MAGNESIUM: CPT

## 2018-08-25 PROCEDURE — 99215 OFFICE O/P EST HI 40 MIN: CPT | Mod: S$GLB,,, | Performed by: INTERNAL MEDICINE

## 2018-08-25 PROCEDURE — 87086 URINE CULTURE/COLONY COUNT: CPT

## 2018-08-25 PROCEDURE — 25000003 PHARM REV CODE 250: Performed by: PHYSICIAN ASSISTANT

## 2018-08-25 PROCEDURE — 81001 URINALYSIS AUTO W/SCOPE: CPT

## 2018-08-25 PROCEDURE — 99284 EMERGENCY DEPT VISIT MOD MDM: CPT | Mod: ,,, | Performed by: PHYSICIAN ASSISTANT

## 2018-08-25 PROCEDURE — 99999 PR PBB SHADOW E&M-EST. PATIENT-LVL IV: CPT | Mod: PBBFAC,,, | Performed by: INTERNAL MEDICINE

## 2018-08-25 PROCEDURE — P9612 CATHETERIZE FOR URINE SPEC: HCPCS

## 2018-08-25 PROCEDURE — 3008F BODY MASS INDEX DOCD: CPT | Mod: CPTII,S$GLB,, | Performed by: INTERNAL MEDICINE

## 2018-08-25 PROCEDURE — 84100 ASSAY OF PHOSPHORUS: CPT

## 2018-08-25 PROCEDURE — 85025 COMPLETE CBC W/AUTO DIFF WBC: CPT

## 2018-08-25 RX ORDER — ACETAMINOPHEN 325 MG/1
650 TABLET ORAL
Status: COMPLETED | OUTPATIENT
Start: 2018-08-25 | End: 2018-08-25

## 2018-08-25 RX ADMIN — ACETAMINOPHEN 650 MG: 325 TABLET, FILM COATED ORAL at 01:08

## 2018-08-25 RX ADMIN — ACETAMINOPHEN 650 MG: 325 TABLET ORAL at 05:08

## 2018-08-25 NOTE — ED NOTES
LOC: The patient is awake, alert and aware of environment with an appropriate affect, the patient is oriented x 3 and speaking appropriately.  APPEARANCE: Patient resting comfortably but has been stroke affected to left side and movement is painful. patient is clean and well groomed  Undressed and in gown.  SKIN: The skin is warm and dry, patient has normal skin turgor and moist mucus membranes, skin intact, no breakdown noted. Bruises to left forearm from recent hospital stays.  MUSKULOSKELETAL: patient can assist in movement but weak to left side.   RESPIRATORY: Airway is open and patent, respirations are spontaneous, patient has a normal effort and rate. Breath sounds are clear and equal bilaterally.  CARDIAC: Normal heart sounds. No peripheral edema.  ABDOMEN: Soft and non tender to palpation.  Bowel sounds presentx4

## 2018-08-25 NOTE — DISCHARGE INSTRUCTIONS
TAKE TYLENOL EVERY 6 HOURS FOR PAIN RELIEF. FOLLOW UP WITH PRIMARY CARE PHYSICIAN IF YOUR SYMPTOMS DO NOT IMPROVE OR WORSEN.    Future Appointments   Date Time Provider Department Center   8/31/2018  9:00 AM LAB, [facility] LAB [place]   9/5/2018 11:00 AM Beverly Muniz MD [facility] OD[facility] [place]   9/17/2018  9:00 AM LAB, [facility] DAY [facility] [place]   9/17/2018 10:00 AM Mike Owen MD [facility] [place]   9/17/2018 10:45 AM INJECTION, [facility] [place]       Our goal in the emergency department is to always give you outstanding care and exceptional service. You may receive a survey by mail or e-mail in the next week regarding your experience in our ED. We would greatly appreciate your completing and returning the survey. Your feedback provides us with a way to recognize our staff who give very good care and it helps us learn how to improve when your experience was below our aspiration of excellence.

## 2018-08-25 NOTE — ED PROVIDER NOTES
Encounter Date: 8/25/2018       History     Chief Complaint   Patient presents with    Back Pain     back pain radiates to right leg, recently started dialysis     Patient is a 58-year-old female with a PMHx of CVA s/p L hemiparesis, CKD on HD on MWF, presents to the ED after being referred by PCP for right back, lower quadrant, and hip pain. Patient states that she started having her pains during admission when she she had a Perma-Cath procedure for hemodialysis.  She stated that her pain is intermittent but feels it most when she is being transported. She is bed bound since CVA and has L sided weakness. She has decrease urine since starting HD, but had an episode of hematuria yesterday. She denies any decrease appitite, fever, chills, diaphoresis, nausea, vomiting, or paresthesias. She tried tylenol without improvement. No history of kidney stones.          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    Cortical cataract of both eyes 7/26/2016    Degeneration of lumbar or lumbosacral intervertebral disc 3/5/2013    S/p MRI L-spine 5/2009     Depression     General anesthetics causing adverse effect in therapeutic use     Hemorrhagic cerebrovascular accident (CVA)     8/2016 s/p Hemicraniotomy at [facility] with Left hemiparesis    Hemorrhagic cerebrovascular accident (CVA) 1/3/2018    History of stroke 6/28/2017    s/p R-MCA stroke with R-putaminal hemorrhagic transformation in 8/2016 and 11/2016 (s/p hemicraniotomy at [facility]) with residual L  hemiparesis, on AED s/p CVA      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    Hyperlipidemia     Hypertensive retinopathy of both eyes 7/26/2016    Impingement syndrome of right shoulder 7/2/2014    Left ventricular diastolic dysfunction with preserved systolic function 12/15/2015    Mixed anxiety and depressive disorder 3/5/2013    Obesity     THERESA (obstructive sleep apnea) 3/5/2013    No Home CPAP 2ndary to cost     Partial symptomatic epilepsy with complex partial seizures, not intractable, without status epilepticus     PEG (percutaneous endoscopic gastrostomy) status 6/28/2017    Renovascular hypertension 3/5/2013    Will resume home medications: amlodipine, labetalol, and hydralazine Will hold Lisinopril in setting of DARLING.   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 without macular edema, without long-term current use of insulin 8/2/2017    Type 2 diabetes mellitus with diabetic polyneuropathy, without long-term current use of insulin 10/22/2015    Type 2 diabetes mellitus with stage 3 chronic kidney disease, without long-term current use of insulin 10/22/2015    Vertebral artery stenosis 3/5/2013    S/p Stenting per Dr Burnett      Past Surgical History:   Procedure Laterality Date    BREAST SURGERY      breast reduction    HYSTERECTOMY      ROTATOR CUFF REPAIR Right July 9, 2014    right side    Skull surgery      Aneurysm    stent placed      in vertebral artery    TUBAL LIGATION       Family History   Problem Relation Age of Onset    Cancer Mother 55        Breast cancer    Diabetes Mother     Hypertension Mother     Heart disease Mother     Heart attack Mother      "Stroke Sister     Hypertension Sister     Sleep apnea Sister     No Known Problems Daughter     No Known Problems Son     Bell's palsy Sister     Lupus Sister     Blindness Maternal Grandmother     Diabetes Unknown         "My entire family family has diabetes"    Stroke Maternal Aunt     Amblyopia Neg Hx     Cataracts Neg Hx     Glaucoma Neg Hx     Macular degeneration Neg Hx     Retinal detachment Neg Hx     Strabismus Neg Hx      Social History     Tobacco Use    Smoking status: Never Smoker    Smokeless tobacco: Never Used   Substance Use Topics    Alcohol use: No     Comment: occasional wine cooler     Drug use: No     Review of Systems   Constitutional: Negative for appetite change, chills, diaphoresis, fatigue and fever.   HENT: Negative for sore throat.    Respiratory: Negative for shortness of breath.    Cardiovascular: Negative for chest pain.   Gastrointestinal: Positive for abdominal pain. Negative for nausea and vomiting.   Genitourinary: Positive for decreased urine volume (HD patient; last yesterday) and hematuria (x1 yesterday). Negative for dysuria.   Musculoskeletal: Positive for arthralgias and back pain.   Skin: Negative for rash.   Neurological: Positive for weakness (L hemiparesis).   Hematological: Does not bruise/bleed easily.       Physical Exam     Initial Vitals [08/25/18 1136]   BP Pulse Resp Temp SpO2   (!) 110/58 60 20 98.2 °F (36.8 °C) (!) 94 %      MAP       --         Physical Exam    Vitals reviewed.  Constitutional: She appears well-developed and well-nourished. She is not diaphoretic. No distress.   HENT:   Head: Normocephalic and atraumatic.   Nose: Nose normal.   Eyes: Conjunctivae and EOM are normal.   Neck: Normal range of motion.   Cardiovascular: Normal rate, regular rhythm and normal heart sounds. Exam reveals no friction rub.    No murmur heard.  Pulmonary/Chest: Breath sounds normal. No respiratory distress. She has no wheezes. She has no rales. "   Abdominal: Soft. Bowel sounds are normal. She exhibits no distension and no mass. There is tenderness in the right lower quadrant. There is no rigidity, no rebound and no guarding.   Musculoskeletal:        Right hip: She exhibits decreased range of motion, decreased strength and bony tenderness.   L hemiparesis.   Neurological: She is alert and oriented to person, place, and time. She has normal strength. No sensory deficit.   Skin: Skin is warm and dry. No erythema. No pallor.   Skin without any ulcers, abrasions, rashes, erythema, or edema.    Psychiatric: She has a normal mood and affect. Her behavior is normal. Judgment and thought content normal.         ED Course   Procedures  Labs Reviewed   CBC W/ AUTO DIFFERENTIAL - Abnormal; Notable for the following components:       Result Value    RBC 2.85 (*)     Hemoglobin 8.7 (*)     Hematocrit 27.8 (*)     MCHC 31.3 (*)     Lymph% 14.8 (*)     All other components within normal limits   COMPREHENSIVE METABOLIC PANEL - Abnormal; Notable for the following components:    CO2 30 (*)     Glucose 223 (*)     BUN, Bld 29 (*)     Creatinine 3.1 (*)     Albumin 3.2 (*)     eGFR if  18.0 (*)     eGFR if non  15.6 (*)     All other components within normal limits   PHOSPHORUS - Abnormal; Notable for the following components:    Phosphorus 4.7 (*)     All other components within normal limits   URINALYSIS, REFLEX TO URINE CULTURE - Abnormal; Notable for the following components:    Appearance, UA Cloudy (*)     Protein, UA 3+ (*)     Leukocytes, UA 2+ (*)     All other components within normal limits    Narrative:     no xtra tube   URINALYSIS MICROSCOPIC - Abnormal; Notable for the following components:    WBC, UA 10 (*)     Bacteria, UA Many (*)     Non-Squam Epith 1 (*)     Granular Casts, UA 1 (*)     Waxy Casts, UA <1 (*)     All other components within normal limits    Narrative:     no xtra tube   CULTURE, URINE   CULTURE, URINE    MAGNESIUM          Imaging Results          US Abdomen Limited (Gallbladder) (Final result)  Result time 08/25/18 17:51:55    Final result by Kevin Alvares MD (08/25/18 17:51:55)                 Impression:      Gallbladder sludge and multiple small gallstones without convincing sonographic evidence of cholecystitis.    Electronically signed by resident: Stevie Moran  Date:    08/25/2018  Time:    17:40    Electronically signed by: Kevin Alvares MD  Date:    08/25/2018  Time:    17:51             Narrative:    EXAMINATION:  US ABDOMEN LIMITED    CLINICAL HISTORY:  Right upper quadrant pain    TECHNIQUE:  Limited ultrasound of the right upper quadrant of the abdomen (including pancreas, liver, gallbladder, common bile duct, and right kidney) was performed.    COMPARISON:  CT renal stone study 08/25/2018.    FINDINGS:  Liver: Normal in size, measuring 15.9 cm. Homogeneous echotexture. No focal hepatic lesions.    Gallbladder: Gallbladder sludge and multiple gallstones measuring up to 5 mm.  Gallbladder wall measures upper limits of normal in thickness.  No pericholecystic fluid, or gallbladder wall hyperemia.  Sonographic Street sign is negative.    Biliary system: The common duct is not dilated, measuring 4 mm.  No intrahepatic ductal dilatation.    Right kidney: Unremarkable.    Miscellaneous: No upper abdominal ascites.                               CT Renal Stone Study ABD Pelvis WO (Final result)  Result time 08/25/18 16:32:02    Final result by Kevin Alvares MD (08/25/18 16:32:02)                 Impression:      1. Mild gallbladder wall thickening noting possible cholelithiasis/sludge.  Correlation with any right upper quadrant pain recommended.  Ultrasound could provide additional information as warranted.  2. Constipation, clinical correlation recommended.  3. No definite nephrolithiasis, correlation with urinalysis advised.  4. Several additional findings above.      Electronically signed  by: Kevin Alvares MD  Date:    08/25/2018  Time:    16:32             Narrative:    EXAMINATION:  CT RENAL STONE STUDY ABD PELVIS WO    CLINICAL HISTORY:  right flank, right abd, right hip pain; ckd recent HD pt;    TECHNIQUE:  Low dose axial images, sagittal and coronal reformations were obtained from the lung bases to the pubic symphysis.  Contrast was not administered.    COMPARISON:  None    FINDINGS:  Images of the lower thorax are remarkable for bilateral dependent atelectasis.  There are a few scattered possible nodular foci versus vessel tortuosity within the left lower lobe, vessel tortuosity favored.  The heart is prominent.  No significant pericardial effusion.    Evaluation of the abdomen and pelvis is limited secondary to patient motion.  Allowing for motion, the liver, spleen, adrenal glands and pancreas are grossly unremarkable noting splenic granulomas.  The gallbladder is mildly distended, with possible wall thickening.  Correlation with any right quadrant pain advised, ultrasound could be performed as warranted.  There is questionable cholelithiasis or sludge.  The pancreatic duct is not dilated.  The common duct measures approximately 0.5 cm.  No significant abdominal lymphadenopathy noting scattered shotty periaortic and paracaval lymph nodes.    There is nonspecific bilateral perinephric fat stranding without hydronephrosis or nephrolithiasis.  The bilateral ureters are unable to be followed in their entirety to the urinary bladder, no definite calculi seen, correlation with urinalysis however is recommended to exclude hematuria.  The urinary bladder is decompressed, limiting evaluation.    There is a moderate to large amount of stool in the rectum, correlation for any history of constipation.  There is moderate stool throughout the remainder of the large bowel without wall thickening.  The terminal ileum is grossly unremarkable.  The suspected appendix is unremarkable.  No pericecal  inflammation.  The small bowel is grossly unremarkable.  No focal organized pelvic fluid collection.  There is atherosclerotic calcification of the aorta and its branches.  Degenerative changes are noted of the spine without focal destructive process.  No significant inguinal lymphadenopathy.  There is body wall anasarca.                                       APC / Resident Notes:   DDx includes but is not limited to UTI, nephrolithiasis, musculoskeletal pain such as arthritis, muscle strain.    UA with 2+ leukocytes, 10 WBCs and 7 squamous epithelial cells.  Many bacteria noted. Will not treat for UTI given signs of contamination.  Urine culture pending.  CBC with normocytic anemia improved from baseline with H/H at 8.7/27.8.  CMP with no significant electrolyte abnormalities.  Creatinine at 3.1.  Last HD yesterday.  CT renal stone study shows mild gallbladder wall thickening noting possible cholelithiasis/sludge.  Constipation noted. No nephrolithiasis.    5:05 PM  Patient updated with results.  She reports improvement in her pain with medication.  She does report occasional right upper quadrant pain with her right flank pain, but not associated with food. She is agreeable to ultrasound.     Ashli Morales PA-C  Emergent Department  [facility]  Spectralink #[id number] or #[id number]          Signout Taken at 1700 Tova Parisi PA-C    Ultrasound results revealed gallbladder sludge and multiple small gallstones.  No evidence of acute cholecystitis at this time.  Upon reassessment, she does not appear to have tenderness on palpation over the flank or abdomen. Patient will be discharged in stable condition. Advised to follow up with family doctor on Monday. Tylenol for pain at home. The care of this patient was overseen by attending physician who agrees with treatment, plan, and disposition.           Attending Attestation:     Physician Attestation Statement for NP/PA:   I discussed this assessment and plan of  this patient with the NP/PA, but I did not personally examine the patient. The face to face encounter was performed by the NP/PA.                     Clinical Impression:   The primary encounter diagnosis was Chronic right-sided low back pain with right-sided sciatica. Diagnoses of RUQ pain and Calculus of gallbladder without cholecystitis without obstruction were also pertinent to this visit.      Disposition:   Disposition: Discharged  Condition: Stable                        Tova Parisi PA-C  08/25/18 1834       Ashli Morales PA-C  08/26/18 0745       Arabella Ott MD  08/27/18 2010

## 2018-08-25 NOTE — ED NOTES
Daughter Reina at bedside. Patient complaining of lower right sided back pain and pain to right lower abdomen.

## 2018-08-26 LAB — BACTERIA UR CULT: NO GROWTH

## 2018-08-27 ENCOUNTER — OUTPATIENT CASE MANAGEMENT (OUTPATIENT)
Dept: ADMINISTRATIVE | Facility: OTHER | Age: 58
End: 2018-08-27

## 2018-08-27 NOTE — PROGRESS NOTES
The following patient has been assigned to Conchita Carranza RN with Outpatient Complex Care Management for high risk screening.    Reason: High Risk Recently Discharged    Please contact [facility] at [phone] with any questions.    Thank you,    Anita Saab    Outpatient Case Management

## 2018-08-29 ENCOUNTER — OUTPATIENT CASE MANAGEMENT (OUTPATIENT)
Dept: ADMINISTRATIVE | Facility: OTHER | Age: 58
End: 2018-08-29

## 2018-08-29 NOTE — PROGRESS NOTES
"8-  Summary:    Spoke briefly  with patient who was very tired andt then with pt's  - pt agreed to enroll in the OPCM program - Pt's  report that pt  is no longer  diabetic and only uses insulin if blood glucose goes over 200  - this had not changed since speaking with pt's daughter 3 weeks ago.  Pt's  reports that pt's B/P is taken daily and recorded - Pt's  reports that it has been running 125-135/70's.   states that pt seemed to be doing well with the renal diet and that they received a lot of information on the diet from the dialysis center. Pt's  reported that they have definitely decreased how often they go out to eat.   Pt stated that is is a little "down" about out pt. Dialysis but she states she is trying.  Pt's  reports that they are struggling with medical and ambulance bills now.  Pt's  reports that pt had almost slipped out of W/C he also stated that was their fault as the family had not secured her at the waist and they usually do   - but he does believe  she is a fall risk because of hemiparesis and when transferring.      Interventions:  Refer to LCSW - financial, advanced directives  In-basket PCP on med rec discrepancy- ergocalciferol 50,000 cap 1 x/per week - not taking   Mail [employer] OTC benefits  Mail education  Literature on Fall Prevention  Mail education literature on importance of avoiding NA, and balancing Ca and phosphorus  Educated on importance of securing pt in W/C     Plan:  Assess knowledge of renal diet and pt compliance  Assess pts compliance in scheduled dialysis visits and time line for home dialysis?  Assess for further educational opportunities  Check [facility] rec for changes     Conchita Carranza RN OPCM    "

## 2018-08-30 VITALS
BODY MASS INDEX: 33.01 KG/M2 | HEART RATE: 64 BPM | WEIGHT: 186.31 LBS | TEMPERATURE: 98 F | SYSTOLIC BLOOD PRESSURE: 118 MMHG | DIASTOLIC BLOOD PRESSURE: 62 MMHG | HEIGHT: 63 IN | OXYGEN SATURATION: 98 %

## 2018-08-31 ENCOUNTER — LAB VISIT (OUTPATIENT)
Dept: LAB | Facility: HOSPITAL | Age: 58
End: 2018-08-31
Attending: INTERNAL MEDICINE
Payer: MEDICARE

## 2018-08-31 DIAGNOSIS — E11.22 CONTROLLED TYPE 2 DIABETES MELLITUS WITH STAGE 4 CHRONIC KIDNEY DISEASE, WITHOUT LONG-TERM CURRENT USE OF INSULIN: ICD-10-CM

## 2018-08-31 DIAGNOSIS — N18.4 CONTROLLED TYPE 2 DIABETES MELLITUS WITH STAGE 4 CHRONIC KIDNEY DISEASE, WITHOUT LONG-TERM CURRENT USE OF INSULIN: ICD-10-CM

## 2018-08-31 DIAGNOSIS — I10 ESSENTIAL HYPERTENSION: ICD-10-CM

## 2018-08-31 LAB
ALBUMIN SERPL BCP-MCNC: 3.3 G/DL
ALP SERPL-CCNC: 66 U/L
ALT SERPL W/O P-5'-P-CCNC: 13 U/L
ANION GAP SERPL CALC-SCNC: 12 MMOL/L
AST SERPL-CCNC: 18 U/L
BASOPHILS # BLD AUTO: 0.07 K/UL
BASOPHILS NFR BLD: 0.9 %
BILIRUB SERPL-MCNC: 0.6 MG/DL
BUN SERPL-MCNC: 42 MG/DL
CALCIUM SERPL-MCNC: 9.5 MG/DL
CHLORIDE SERPL-SCNC: 97 MMOL/L
CHOLEST SERPL-MCNC: 157 MG/DL
CHOLEST/HDLC SERPL: 3.6 {RATIO}
CO2 SERPL-SCNC: 31 MMOL/L
CREAT SERPL-MCNC: 3.9 MG/DL
DIFFERENTIAL METHOD: ABNORMAL
EOSINOPHIL # BLD AUTO: 0.3 K/UL
EOSINOPHIL NFR BLD: 3.5 %
ERYTHROCYTE [DISTWIDTH] IN BLOOD BY AUTOMATED COUNT: 12.5 %
EST. GFR  (AFRICAN AMERICAN): 13.7 ML/MIN/1.73 M^2
EST. GFR  (NON AFRICAN AMERICAN): 11.8 ML/MIN/1.73 M^2
ESTIMATED AVG GLUCOSE: 117 MG/DL
GLUCOSE SERPL-MCNC: 198 MG/DL
HBA1C MFR BLD HPLC: 5.7 %
HCT VFR BLD AUTO: 28.2 %
HDLC SERPL-MCNC: 44 MG/DL
HDLC SERPL: 28 %
HGB BLD-MCNC: 8.7 G/DL
IMM GRANULOCYTES # BLD AUTO: 0.03 K/UL
IMM GRANULOCYTES NFR BLD AUTO: 0.4 %
LDLC SERPL CALC-MCNC: 101.8 MG/DL
LYMPHOCYTES # BLD AUTO: 1.2 K/UL
LYMPHOCYTES NFR BLD: 15.9 %
MCH RBC QN AUTO: 31.2 PG
MCHC RBC AUTO-ENTMCNC: 30.9 G/DL
MCV RBC AUTO: 101 FL
MONOCYTES # BLD AUTO: 0.4 K/UL
MONOCYTES NFR BLD: 5.6 %
NEUTROPHILS # BLD AUTO: 5.6 K/UL
NEUTROPHILS NFR BLD: 73.7 %
NONHDLC SERPL-MCNC: 113 MG/DL
NRBC BLD-RTO: 0 /100 WBC
PLATELET # BLD AUTO: 319 K/UL
PMV BLD AUTO: 10.2 FL
POTASSIUM SERPL-SCNC: 4 MMOL/L
PROT SERPL-MCNC: 7.1 G/DL
RBC # BLD AUTO: 2.79 M/UL
SODIUM SERPL-SCNC: 140 MMOL/L
TRIGL SERPL-MCNC: 56 MG/DL
WBC # BLD AUTO: 7.62 K/UL

## 2018-08-31 PROCEDURE — 85025 COMPLETE CBC W/AUTO DIFF WBC: CPT

## 2018-08-31 PROCEDURE — 36415 COLL VENOUS BLD VENIPUNCTURE: CPT | Mod: PO

## 2018-08-31 PROCEDURE — 83036 HEMOGLOBIN GLYCOSYLATED A1C: CPT

## 2018-08-31 PROCEDURE — 80061 LIPID PANEL: CPT

## 2018-08-31 PROCEDURE — 80053 COMPREHEN METABOLIC PANEL: CPT

## 2018-08-31 NOTE — PATIENT INSTRUCTIONS
Hemodialysis    Hemodialysis is a type of treatment for kidney failure (also called end-stage kidney disease or ESRD). It uses a machine that holds a filter called a dialyzer. As blood flows through the dialyzer, waste is removed and fluid and chemicals are balanced. Hemodialysis treatments are usually done at a special dialysis center. In some cases, treatments may be done at home. As the kidney failure is getting worse, your doctor may advise you to have an access placed by a surgeon into one of your arms ahead of time. This access may take several weeks to mature before it can be used for hemodialysis.  How hemodialysis is done  Two needles are inserted into a blood vessel (called an arteriovenous fistula or AV fistula) or arteriovenous graft (or AV graft), usually in your arm. Each needle is attached to a tube. One tube carries your blood into the dialyzer, where it is cleaned. Clean blood returns to your body through a second tube and needle. If this treatment has to be done as an emergency, a plastic tube (catheter) is inserted into a large vein, typically in the neck or groin. This catheter helps carry blood to and from the dialysis machine.  Your experience  · Hemodialysis usually takes about 3 to 5 hours. It is usually done 3 times a week.  · Youll have a regular schedule for your hemodialysis. Many centers have evening and weekend hours as well as weekday hours to help you continue working. Some centers also offer overnight treatments.  · A trained nurse or technician connects you to the dialysis machine. He or she watches for problems and makes sure you are comfortable.  · During treatment, only a small amount of blood (about 1 cup) is out of your body at any one time.  · During your treatments, you may have a headache, muscle cramps, nausea and vomiting, chest and back pain, itching, and fever and chills. Make sure you tell your nurse or technician if you have any of these symptoms.  · Some people are  able to learn to use a dialysis at home. Home dialysis allows you to schedule treatments when it is most convenient. You may have more frequent treatments, but for shorter periods of time. You may also do overnight treatments.  Problems to watch for  Get immediate medical help or call your doctor, nurse or dialysis technician if you have any of these symptoms after treatment:  · Chest pain  · Bleeding from the needle site  · Shortness of breath  · Fever or chills  · Headache or lightheadedness  · Nausea or vomiting  · Itching  · Muscle cramps  · Pain, warm or redness at your access site  · Inability to feel your blood flow (called a thrill) in your AV fistula or graft   Date Last Reviewed: 12/1/2016  © 8909-7369 [employer]. [address] [place] [address], [place] [address]. All rights reserved. This information is not intended as a substitute for professional medical care. Always follow your healthcare professional's instructions.

## 2018-08-31 NOTE — PROGRESS NOTES
Subjective:       Patient ID: Lucy Perez is a 58 y.o. female.    Chief Complaint: Back Pain    HPI  She complains of back and abdominal pain 9/10 in intensity  Review of Systems   Constitutional: Negative for chills, fatigue, fever and unexpected weight change.   Respiratory: Negative for chest tightness and shortness of breath.    Cardiovascular: Negative for chest pain and palpitations.   Gastrointestinal: Positive for abdominal pain. Negative for blood in stool.   Neurological: Negative for dizziness, syncope, numbness and headaches.       Objective:      Physical Exam   HENT:   Right Ear: External ear normal.   Left Ear: External ear normal.   Nose: Nose normal.   Mouth/Throat: Oropharynx is clear and moist.   Eyes: Pupils are equal, round, and reactive to light.   Neck: Normal range of motion.   Cardiovascular: Normal rate and regular rhythm.   No murmur heard.  Pulmonary/Chest: Breath sounds normal.   Abdominal: She exhibits no distension. There is no hepatosplenomegaly. There is tenderness.   Lymphadenopathy:     She has no cervical adenopathy.     She has no axillary adenopathy.   Neurological: She has normal strength and normal reflexes. No cranial nerve deficit or sensory deficit.       Assessment/Plan       Assessment and plan:  Abdominal and back pain:  Patient transferred to emergency room for further evaluation

## 2018-09-04 ENCOUNTER — OUTPATIENT CASE MANAGEMENT (OUTPATIENT)
Dept: ADMINISTRATIVE | Facility: OTHER | Age: 58
End: 2018-09-04

## 2018-09-04 NOTE — PROGRESS NOTES
9-4-2018  [x] Called and reviewed disaster plan with patient/caregiver.  Provided emergency phone number for patient's [place].   [] Attempted to reach patient/caregiver to review disaster plan.  Left a voice message with the phone number for patient's [place] Office of Emergency Preparedness.     Reviewed with Patient/Caregiver:  [x] Patient to have a supply of water, non-perishable food items, flashlights and batteries  [x] Patient to bring all medications if evacuates:  at least a five day supply preferably in the medication bottles, in case displaced for longer than 5 days  [] Patient to bring any DME needed (walkers, wheelchairs, shower chairs, nebulizer machine, etc.)   [] If Diabetic, patient to bring glucometer and monitoring supplies.   [] If Hypertension, patient to bring blood pressure cuff  [] If CHF, patient to bring scale  [] If tube feeds, patient to bring tube feedings and feeding supplies.  [] If on oxygen,  patient to bring all oxygen supplies (Cannula, portable tanks, concentrator).  Patient will contact oxygen supply company to find out the nearest location of a supply company near evacuated location. ([employer]: [phone], [facility]: [phone], [facility]:[phone], [employer]: [phone]), [facility] -[phone].  [] If on hemodialysis, patient should already have a plan in place with dialysis center. If patient does not know where to evacuate to in order to be close to a dialysis center, patient/caregiver to reach out to regular dialysis center for further direction. ([facility]  service line: [phone], [facility]  service line [phone])  [] If receiving treatment at an infusion center, patient/caregiver to contact the infusion center to find out which infusion center they have a contract with where patient plans to evacuate.      Office of Emergency Preparedness Phone number:  [] [place]: [phone]  [x] [facility]:  [phone]  [] [place]: [phone]  [] [place]: [phone]  [] [place]: [phone]  [] [place]: [phone]  [] [place]: [phone]  [] [facility]: [phone]  [] [facility] [place]: [phone]  [] [facility]: [phone]  [] [facility]: [phone]  [] [place]: [phone]  [] [facility]: [phone]    [] [facility]:  [phone]   [] [facility]:  [phone]   [] [facility]:  [phone]   [] [facility]:  [phone]   [] [facility]:  [phone]   [] [facility]: [phone]   [] [facility]:  [phone]   [] [facility]:  [phone]   [] [facility]:  [phone]   [] [facility]:  [phone]   [] [facility]:  [phone]   [] [place]:  [phone]   [] [facility]:  [phone]   [] [facility]:  [phone]   [] [facility]:  [phone]   [] [facility] [place]: [phone]   [] [facility]:  [phone]   [] [facility]: [phone]   [] [facility]: [phone] Conchita Carranza RN OPCM

## 2018-09-05 ENCOUNTER — OFFICE VISIT (OUTPATIENT)
Dept: INTERNAL MEDICINE | Facility: CLINIC | Age: 58
End: 2018-09-05
Payer: MEDICARE

## 2018-09-05 ENCOUNTER — TELEPHONE (OUTPATIENT)
Dept: INTERNAL MEDICINE | Facility: CLINIC | Age: 58
End: 2018-09-05

## 2018-09-05 VITALS — DIASTOLIC BLOOD PRESSURE: 70 MMHG | SYSTOLIC BLOOD PRESSURE: 132 MMHG | OXYGEN SATURATION: 97 % | HEART RATE: 58 BPM

## 2018-09-05 DIAGNOSIS — E78.00 PURE HYPERCHOLESTEROLEMIA: ICD-10-CM

## 2018-09-05 DIAGNOSIS — K80.20 CALCULUS OF GALLBLADDER WITHOUT CHOLECYSTITIS WITHOUT OBSTRUCTION: ICD-10-CM

## 2018-09-05 DIAGNOSIS — E11.65 TYPE 2 DIABETES MELLITUS WITH HYPERGLYCEMIA, WITHOUT LONG-TERM CURRENT USE OF INSULIN: ICD-10-CM

## 2018-09-05 DIAGNOSIS — I63.9 CEREBROVASCULAR ACCIDENT (CVA), UNSPECIFIED MECHANISM: Primary | ICD-10-CM

## 2018-09-05 DIAGNOSIS — N18.6 ESRD (END STAGE RENAL DISEASE): ICD-10-CM

## 2018-09-05 DIAGNOSIS — I15.0 RENOVASCULAR HYPERTENSION: Chronic | ICD-10-CM

## 2018-09-05 DIAGNOSIS — M62.838 MUSCLE SPASTICITY: ICD-10-CM

## 2018-09-05 DIAGNOSIS — I10 HYPERTENSION, UNSPECIFIED TYPE: ICD-10-CM

## 2018-09-05 DIAGNOSIS — G47.00 INSOMNIA, UNSPECIFIED TYPE: Primary | ICD-10-CM

## 2018-09-05 PROCEDURE — 99999 PR PBB SHADOW E&M-EST. PATIENT-LVL IV: CPT | Mod: PBBFAC,,, | Performed by: INTERNAL MEDICINE

## 2018-09-05 PROCEDURE — 3044F HG A1C LEVEL LT 7.0%: CPT | Mod: CPTII,,, | Performed by: INTERNAL MEDICINE

## 2018-09-05 PROCEDURE — 99214 OFFICE O/P EST MOD 30 MIN: CPT | Mod: S$PBB,,, | Performed by: INTERNAL MEDICINE

## 2018-09-05 PROCEDURE — 99214 OFFICE O/P EST MOD 30 MIN: CPT | Mod: PBBFAC,PO | Performed by: INTERNAL MEDICINE

## 2018-09-05 PROCEDURE — 99499 UNLISTED E&M SERVICE: CPT | Mod: S$PBB,,, | Performed by: INTERNAL MEDICINE

## 2018-09-05 RX ORDER — SEVELAMER CARBONATE 800 MG/1
800 TABLET, FILM COATED ORAL
COMMUNITY

## 2018-09-05 RX ORDER — PREGABALIN 25 MG/1
CAPSULE ORAL
Qty: 270 CAPSULE | Refills: 2 | Status: SHIPPED | OUTPATIENT
Start: 2018-09-05 | End: 2019-01-15 | Stop reason: SDUPTHER

## 2018-09-05 RX ORDER — TRAZODONE HYDROCHLORIDE 100 MG/1
100 TABLET ORAL NIGHTLY
Qty: 90 TABLET | Refills: 1 | Status: SHIPPED | OUTPATIENT
Start: 2018-09-05 | End: 2019-01-15

## 2018-09-05 RX ORDER — CARVEDILOL 25 MG/1
25 TABLET ORAL 2 TIMES DAILY
Qty: 180 TABLET | Refills: 2 | Status: SHIPPED | OUTPATIENT
Start: 2018-09-05 | End: 2018-12-03 | Stop reason: SDUPTHER

## 2018-09-05 NOTE — PROGRESS NOTES
Ms. Perez is a 58-year-old female, known to myself, who presents today for   scheduled followup appointment.    CHIEF COMPLAINT:  Followup diabetes, hypertension, gallstones, health   maintenance issues.    HISTORY OF PRESENT ILLNESS:  Ms. Perez presents noting that she was seen in   the Emergency Room on August 25th with rather severe right flank/back pain.    She was at that time diagnosed with gallstones.  She has in fact adjusted her   diet to be eating less fried foods and dairy products.  Her pain has improved   remarkably, although is not resolved.  She would prefer to not have surgery if   possible for the gallstones, but would like to know more about this as well.    She has not been seen in Surgery yet for the gallstones.  She continues to be   quite active in her Physical and Occupational Therapy to rehabilitate from her   stroke.  She has tolerated starting her hemodialysis well also.  This was   started in first half of August with the patient's admission with uremia.  She   does go now 3 days a week for dialysis, seems to be doing fine with it.  Blood   pressures at home seem to be doing well.  She is taking her trazodone to help   with sleep, but this does not seem to be helping.  She is on the 50 mg at   bedtime.  She presents with actually her son Wilder and daughter Reina.  Her   son, Wilder has been assisting her with following diabetic healthy diet, seems to   be doing well with this.  She does request refills on her Lyrica.  Also,   requests a shower seat so that she can shower at home.  She presently is in a   reclining wheelchair, which seems to work quite well for her.  She does use   oxygen occasionally as needed at home, but nothing on a regular basis.  She has   had no new shortness of breath or chest pain.  No fevers or chills.    PAST MEDICAL, SURGICAL, SOCIAL HISTORY:  Please see as thoroughly stated in EPIC   chart, which has been reviewed.    PHYSICAL EXAMINATION:  VITAL  SIGNS:  Blood pressure 118/60, pulse 58, pulse ox 97%.  Recheck blood   pressure per MD is 132/70.  GENERAL:  Lucy appears comfortable, seated in a wheelchair, very talkative with   articulation markedly improved from prior visits.  She appears comfortable and   happy.  HEENT:  Shows some soft tissue/temporal wasting in the right temple area status   post craniotomy surgeries in the past, but in general, otherwise normal.  NECK:  Supple.  Negative for masses or thyromegaly, negative for   lymphadenopathy.  LUNGS:  Clear to auscultation bilaterally with the exception of decreased breath   sounds in the right lung base.  HEART:  Regular rate and rhythm without arrhythmias.  Ventricular heart rate is   66.  ABDOMEN:  Positive bowel sounds, soft.  There is mild tenderness with deep   palpation in the right upper quadrant abdominal area.  No rebound or guarding.    No masses on palpation.  EXTREMITIES:  Negative for edema.    WORKUP:  Lab work 08/31/2018 showing cholesterol 157 with , hemoglobin   A1c is excellent at 5.7.  CBC shows H and H at 8 and 28%.  Chemistry is with BUN   and creatinine at 42/3.9, stable for patient, status post dialysis initiation   with GFR 13.    ASSESSMENT AND PLAN:  1.  Diabetes mellitus type 2, non-insulin-dependent and controlled with diet   with history of prior peripheral neuropathy and chronic renal disease as above.  A. Continue with ADA diet and will closely follow.  B. Continue dialysis and following as per Nephrology, Dr. Blank.  2.  Essential hypertension, controlled.  A. Continue on therapy, which includes carvedilol 25 mg 1 p.o. b.i.d.,   hydralazine 100 mg t.i.d., nifedipine 90 mg one a day and Lasix 40 mg one a day.  3.  Cholelithiasis without evidence of acute cholecystitis, although with some   evidence of chronic pain in a very complex patient with multiple medical   comorbidities.  A. I have recommended the patient continue with low-fat bland diet to keep her   from  having an acute flare of the gallstones.  B. We will refer to General Surgery, Dr. Morris, for further evaluation and   recommendations in the event that the patient requires cholecystectomy.  C. I have already messaged Dr. Man in the Preop Clinic to ask for   assistance with clearance given the patient's very complex history.  D. If any acute worsening of symptoms, the patient to present to the Emergency   Room.  4.  Hyperlipidemia, controlled.  A. Continue with Lipitor 40 mg daily.  5.  Status post hemorrhagic CVA with left-sided hemiparesis, although the   patient making remarkable strides having had PEG tube discontinued with   articulation skills markedly improved and lower extremity strength slightly   increased, although still with hemiparesis of the left upper extremity   completely.  A. We have requested a shower seat with the back.  B. We will refill Lyrica, which the patient is presently taking at 25 mg one in   the a.m. and two in the p.m. to assist with post-stroke muscular spasms and   painful neuropathy.  C. Ensure routine followup with Dr. Pardo in Physical Medicine Rehabilitation.  6.  Health maintenance.  A. Return to clinic in four months with one week prior fasting lab work or   follow up sooner if needed.  7.  End-stage renal disease with the patient on hemodialysis.  A. We will follow along as per nephrologist, Dr. Blank.      FMS/HN  dd: 09/05/2018 18:31:20 (CDT)  td: 09/05/2018 22:42:23 (CDT)  Doc ID   #[id number]  Job ID #[id number]    CC:     This office note has been dictated.

## 2018-09-05 NOTE — TELEPHONE ENCOUNTER
Ivelisse, please fax pt's order for a Shower Chair to [facility] to help get it authorized.  Thanks

## 2018-09-05 NOTE — TELEPHONE ENCOUNTER
Dr Man  This pt is a somewhat complicated 57 y/o sweet female who is s/p a hemorrhagic CVA in 8/2016  with residual left sided hemiparesis who has made amazing progress with speech and eating skills(PEG D/C'd).  She has ESRD just recently started on dialysis,controlled DM,HTN,chronic anemia, past sarcoidosis/COPD,PUD . She unfortunately was dx'd with cholelithiasis 8/25 in ER and will possibly need a lap cholecystectomy. If possible, would you be able to see her and perform a  pre-operative clearance?(For your MA:Her daughter, Reina handles scheduling all appointments.)  She hasn't seen Surgery yet but I've referred her to Dr WELLINGTON Morris.  Thanks for any help, Beverly Muniz

## 2018-09-07 ENCOUNTER — OUTPATIENT CASE MANAGEMENT (OUTPATIENT)
Dept: ADMINISTRATIVE | Facility: OTHER | Age: 58
End: 2018-09-07

## 2018-09-07 NOTE — PROGRESS NOTES
Summary:  1st Attempt to complete Social Work Assessment for Outpatient Care Management.  Pt answered the phone, stating she was currently at dialysis. Her HD days are MWF.  LCSW will reattempt at a later date of 9/11 at 9am.    Intervention:  None    Plan for next encounter;  Make 2nd attempt on 9/11

## 2018-09-07 NOTE — PROGRESS NOTES
9-7-2018  Summary:    Called pt to follow-up but opened the encounter in  error realizing today is Friday - pt's dialysis day - did speak with pt /she was waiting for transportation to dialysis. Spoke briefly with  who was confused as to a second call this week stating that OPCM (this RN) had already called earlier this week.      Interventions:  Explained to caretaker () that call earlier this week was for Emergency preparedness - purpose of this call was to follow-up on the pt. (his wife)  Asked for a good time to follow-up next week as they were getting ready to leave for dialysis     Plan:  Follow-up next week at date and time given by pt  (9-11 at 9 am)    Conchita Carranza RN OPCM

## 2018-09-10 ENCOUNTER — OUTPATIENT CASE MANAGEMENT (OUTPATIENT)
Dept: ADMINISTRATIVE | Facility: OTHER | Age: 58
End: 2018-09-10

## 2018-09-10 PROBLEM — G44.52 NEW DAILY PERSISTENT HEADACHE: Status: RESOLVED | Noted: 2017-10-09 | Resolved: 2018-09-10

## 2018-09-10 PROBLEM — E11.65 TYPE 2 DIABETES MELLITUS WITH HYPERGLYCEMIA, WITHOUT LONG-TERM CURRENT USE OF INSULIN: Status: RESOLVED | Noted: 2018-08-10 | Resolved: 2018-09-10

## 2018-09-10 NOTE — PROGRESS NOTES
Patient Lucy Perez, MRN [id number], was dependent on dialysis (ICD10 Z99.2) at the time of this visit on 9/5/18. This addendum is made to the medical record on 09/10/2018.

## 2018-09-10 NOTE — PROGRESS NOTES
"9-  Summary:  Spoke with pt on follow-up - pt reports that she is trying to do everything she is supposed to do - stated that the fluid restrictions of 16 oz of fluid/day  is hard and the diet is not good but  Reports she is trying to be  Compliant. - however pt was vague on what she was actually eating.  She reports no further falls or slipping"out of W/C.   on phone too(speaker phone) stated that they received the packet of literature - but that he had not opened it yet.  Pt reported she was getting ready for dialysis stating her dialysis is a 1pm  And she will get home around 5 pm or so.    Interventions:  Dicussed briefly low sodium - pt verbalized one food high in NA - Jack maldonado  Encouraged  to open packet to review  Informed pt that LCSW will be calling tomorrow as the time of 9am on Monday or Tues as shared with her as a convenient time for the pt and      Plan:  Review literature in packet  Assess understanding of fall prevention  Review nutritional habits and assess  understanding of diet and compliance    Conchita Carranza RN OPCM  "

## 2018-09-11 ENCOUNTER — OUTPATIENT CASE MANAGEMENT (OUTPATIENT)
Dept: ADMINISTRATIVE | Facility: OTHER | Age: 58
End: 2018-09-11

## 2018-09-11 NOTE — LETTER
September 11, 2018    Lucy Perez  [address] [address]             [facility]  [address]  [facility] [place] [place] [facility] [address] Dear Mrs. Perez:    I am writing from the Outpatient Complex Care Management Department at [facility].  It was a pleasure speaking with you earlier today.  Per our conversation, enclosed are some resource information that may be of benefit. If you have any questions, please give me a call.     I can be reached at [phone] Monday through Friday from 8:00am to 4:30pm.  [facility] also has a program with a nurse available 24/7 to answer questions or provide medical advice.  [facility] can be reached at [phone].    Sincerely,       Bebe Blue LCSW    Outpatient Complex Care Management

## 2018-09-11 NOTE — PROGRESS NOTES
Summary:  LCSW received a referral for OPCM-RNConchita for assistance with financial assistance and advanced care planning. Pt resides with her , Wilder. Wilder Henry. And her daughter, Clarisa are her primary support system. Pt has a current MPOA on file and pt's family asked that a copy be forwarded to them. Pt stated she is not aware of financial assistance through [facility]. Pt reports having difficulty paying for medications. Pt desires a referral to Meals on Wheels. Pt attends Dialysis ([facility]) on MWF. Pt desires information for applying for Medicaid. Pt denied other needs to be addressed at this time. PT's family transports her to her appointment. LCSW will follow-up with pt in 2 weeks.     Intervention:  Financial assistance - referral to Pharm Assistance, Financial Assistance application/PACS  Food resources- referral to Meals on Wheels  Medicaid Contact information    Plan for next encounter:  Confirm receipt of mailing  Discuss if made contact with PACS/Financial Aid application  Discuss if staff made contact with pt for Pharm. Assistance  Discuss if made contact with Medicaid   Discuss if COA made contact with Meals on WHeels

## 2018-09-17 ENCOUNTER — OUTPATIENT CASE MANAGEMENT (OUTPATIENT)
Dept: ADMINISTRATIVE | Facility: OTHER | Age: 58
End: 2018-09-17

## 2018-09-17 NOTE — PROGRESS NOTES
Summary:  1st missed follow-up attempt    Interventions:  Left message for return call back    Plan:  Follow -up phone call 9-21    SERENA Carranza RN OPCM

## 2018-09-20 ENCOUNTER — HOSPITAL ENCOUNTER (OUTPATIENT)
Dept: RADIOLOGY | Facility: HOSPITAL | Age: 58
Discharge: HOME | End: 2018-09-20
Attending: HOSPITALIST
Payer: MEDICARE

## 2018-09-20 ENCOUNTER — INITIAL CONSULT (OUTPATIENT)
Dept: INTERNAL MEDICINE | Facility: CLINIC | Age: 58
End: 2018-09-20
Payer: MEDICARE

## 2018-09-20 VITALS
WEIGHT: 217.13 LBS | BODY MASS INDEX: 38.47 KG/M2 | TEMPERATURE: 97 F | SYSTOLIC BLOOD PRESSURE: 124 MMHG | HEIGHT: 63 IN | HEART RATE: 64 BPM | OXYGEN SATURATION: 98 % | DIASTOLIC BLOOD PRESSURE: 76 MMHG

## 2018-09-20 DIAGNOSIS — I51.89 LEFT VENTRICULAR DIASTOLIC DYSFUNCTION WITH PRESERVED SYSTOLIC FUNCTION: ICD-10-CM

## 2018-09-20 DIAGNOSIS — M06.9 RHEUMATOID ARTHRITIS INVOLVING MULTIPLE SITES, UNSPECIFIED RHEUMATOID FACTOR PRESENCE: Primary | ICD-10-CM

## 2018-09-20 DIAGNOSIS — I10 ESSENTIAL HYPERTENSION: ICD-10-CM

## 2018-09-20 DIAGNOSIS — Z01.818 PREOP EXAMINATION: ICD-10-CM

## 2018-09-20 DIAGNOSIS — G47.33 OSA (OBSTRUCTIVE SLEEP APNEA): ICD-10-CM

## 2018-09-20 DIAGNOSIS — J96.01 ACUTE RESPIRATORY FAILURE WITH HYPOXIA: ICD-10-CM

## 2018-09-20 DIAGNOSIS — R22.9 SOFT TISSUE SWELLING: ICD-10-CM

## 2018-09-20 DIAGNOSIS — E11.22 CONTROLLED TYPE 2 DIABETES MELLITUS WITH CHRONIC KIDNEY DISEASE ON CHRONIC DIALYSIS, WITHOUT LONG-TERM CURRENT USE OF INSULIN: ICD-10-CM

## 2018-09-20 DIAGNOSIS — N18.6 CONTROLLED TYPE 2 DIABETES MELLITUS WITH CHRONIC KIDNEY DISEASE ON CHRONIC DIALYSIS, WITHOUT LONG-TERM CURRENT USE OF INSULIN: ICD-10-CM

## 2018-09-20 DIAGNOSIS — N18.6 ESRD (END STAGE RENAL DISEASE): ICD-10-CM

## 2018-09-20 DIAGNOSIS — I27.20 PULMONARY HYPERTENSION: ICD-10-CM

## 2018-09-20 DIAGNOSIS — Z98.61 CORONARY ANGIOPLASTY STATUS: ICD-10-CM

## 2018-09-20 DIAGNOSIS — Z99.2 CONTROLLED TYPE 2 DIABETES MELLITUS WITH CHRONIC KIDNEY DISEASE ON CHRONIC DIALYSIS, WITHOUT LONG-TERM CURRENT USE OF INSULIN: ICD-10-CM

## 2018-09-20 DIAGNOSIS — F41.8 MIXED ANXIETY AND DEPRESSIVE DISORDER: ICD-10-CM

## 2018-09-20 DIAGNOSIS — G40.209 PARTIAL SYMPTOMATIC EPILEPSY WITH COMPLEX PARTIAL SEIZURES, NOT INTRACTABLE, WITHOUT STATUS EPILEPTICUS: ICD-10-CM

## 2018-09-20 DIAGNOSIS — I61.9 HEMORRHAGIC CEREBROVASCULAR ACCIDENT (CVA): ICD-10-CM

## 2018-09-20 DIAGNOSIS — G81.14 LEFT SPASTIC HEMIPARESIS: ICD-10-CM

## 2018-09-20 DIAGNOSIS — K59.00 CONSTIPATION, UNSPECIFIED CONSTIPATION TYPE: ICD-10-CM

## 2018-09-20 DIAGNOSIS — K80.20 GALL STONES: ICD-10-CM

## 2018-09-20 DIAGNOSIS — D86.9 SARCOIDOSIS: Chronic | ICD-10-CM

## 2018-09-20 DIAGNOSIS — D63.1 ANEMIA IN ESRD (END-STAGE RENAL DISEASE): ICD-10-CM

## 2018-09-20 DIAGNOSIS — N18.6 ANEMIA IN ESRD (END-STAGE RENAL DISEASE): ICD-10-CM

## 2018-09-20 DIAGNOSIS — E78.5 HYPERLIPIDEMIA, UNSPECIFIED HYPERLIPIDEMIA TYPE: ICD-10-CM

## 2018-09-20 DIAGNOSIS — M06.9 RHEUMATOID ARTHRITIS INVOLVING MULTIPLE SITES, UNSPECIFIED RHEUMATOID FACTOR PRESENCE: ICD-10-CM

## 2018-09-20 PROBLEM — N19 UREMIA: Status: RESOLVED | Noted: 2018-08-09 | Resolved: 2018-09-20

## 2018-09-20 PROBLEM — R09.02 HYPOXIA: Status: RESOLVED | Noted: 2018-02-23 | Resolved: 2018-09-20

## 2018-09-20 PROBLEM — R06.2 WHEEZING: Status: RESOLVED | Noted: 2018-02-21 | Resolved: 2018-09-20

## 2018-09-20 PROBLEM — R06.02 SHORTNESS OF BREATH: Status: RESOLVED | Noted: 2018-02-21 | Resolved: 2018-09-20

## 2018-09-20 PROBLEM — J18.9 PNEUMONIA DUE TO INFECTIOUS ORGANISM: Status: RESOLVED | Noted: 2018-02-21 | Resolved: 2018-09-20

## 2018-09-20 PROBLEM — Z93.1 PEG (PERCUTANEOUS ENDOSCOPIC GASTROSTOMY) STATUS: Chronic | Status: RESOLVED | Noted: 2017-06-28 | Resolved: 2018-09-20

## 2018-09-20 PROCEDURE — 99499 UNLISTED E&M SERVICE: CPT | Mod: HCNC,S$GLB,, | Performed by: HOSPITALIST

## 2018-09-20 PROCEDURE — 3044F HG A1C LEVEL LT 7.0%: CPT | Mod: CPTII,,, | Performed by: HOSPITALIST

## 2018-09-20 PROCEDURE — 72050 X-RAY EXAM NECK SPINE 4/5VWS: CPT | Mod: 26,,, | Performed by: RADIOLOGY

## 2018-09-20 PROCEDURE — 99215 OFFICE O/P EST HI 40 MIN: CPT | Mod: S$PBB,,, | Performed by: HOSPITALIST

## 2018-09-20 PROCEDURE — 99213 OFFICE O/P EST LOW 20 MIN: CPT | Mod: PBBFAC | Performed by: HOSPITALIST

## 2018-09-20 PROCEDURE — 72050 X-RAY EXAM NECK SPINE 4/5VWS: CPT | Mod: TC

## 2018-09-20 PROCEDURE — 3008F BODY MASS INDEX DOCD: CPT | Mod: CPTII,,, | Performed by: HOSPITALIST

## 2018-09-20 PROCEDURE — 99999 PR PBB SHADOW E&M-EST. PATIENT-LVL III: CPT | Mod: PBBFAC,,, | Performed by: HOSPITALIST

## 2018-09-20 NOTE — OUTPATIENT SUBJECTIVE & OBJECTIVE
Outpatient Subjective & Objective     Chief complaint-Preoperative evaluation, Perioperative Medical management, complication reduction plan     Active cardiac conditions- none    Revised cardiac risk index predictors- coronary artery disease, history of heart failure, history of cerebrovascular disease and creatinine more than 2    Functional capacity -Examples of physical activity, was active until 2016 , until had the stroke, was able to take a flight of stairs until that time . Currently limited.She can undertake all the above activities without  chest pain,chest tightness,  ,dizziness,lightheadedness making her exercise tolerance , less  than 4 Mets.   Winded on exertion, not new,long standing  ,stable over time     Review of Systems   Constitutional: Negative for chills and fever.            Weight loss- from the stroke, gained some back      HENT:        Sleep apnea   Eyes:        Bleeding - Left eye   Diabetes   visual changes   Respiratory:        No cough , phlegm  No Hemoptysis   Cardiovascular:        As noted   Gastrointestinal:        No overt GI/ blood losses  Bowel movements- constipated   Endocrine:        Prednisone use > 20 mg daily for 3 weeks- None   Genitourinary: Negative for dysuria.        No urinary hesitancy    Musculoskeletal:        As above      Skin: Negative for rash.   Neurological: Negative for syncope.   Hematological:          Current use of Antiplatelet agents     Psychiatric/Behavioral:          Anxiety -dialysis days           Past Surgical History:   Procedure Laterality Date    ARTHROSCOPY-SHOULDER WITH SUBACROMIAL DECOMPRESSION Right 7/9/2014    [facility]    Biceps Tenotomy Right 7/9/2014    [facility]    BREAST SURGERY      breast reduction    COLONOSCOPY N/A 8/11/2016    Procedure: COLONOSCOPY;  Surgeon: Jerry Vilchis MD;  Location: [address];  Service: Endoscopy;  Laterality: N/A;   "Patient reports difficulty awaking from anesthesia in the past.    COLONOSCOPY N/A 8/11/2016    [facility] ([facility])    DEBRIDEMENT-SHOULDER-ARTHROSCOPIC Labral Cuff Right 7/9/2014    [facility]    ESOPHAGOGASTRODUODENOSCOPY (EGD) N/A 12/6/2017    Per[facility] by Dallas Lock Jr., [facility] at [facility]O    ZCYYZCMM-TLNDBUHO-QOAOLY END Right 7/9/2014    [facility] at [facility]    HYSTERECTOMY      ROTATOR CUFF REPAIR Right July 9, 2014    right side    Skull surgery      Aneurysm    stent placed      in vertebral artery    TUBAL LIGATION         No  bleeding, cardiac problems, PONV with previous surgeries/procedures.  Medications and Allergies reviewed in epic.   FH- No anesthesia,bleeding / venous thrombosis ,in family   Lives with  , who can help     Physical Exam  Height 5' 3" (1.6 m), weight 98.5 kg (217 lb 2.5 oz).      Physical Exam  Constitutional- Vitals - Body mass index is 38.47 kg/m²., There were no vitals filed for this visit.  General appearance-Conscious,Coherent  Eyes- No conjunctival icterus,pupils  round  and  Bilateral cataracts  ENT-Oral cavity- moist  , Hearing grossly normal   Neck- No thyromegaly ,Trachea -central, No jugular venous distension,   No Carotid Bruit   Cardiovascular -Heart Sounds- Normal  and  no murmur   , No gallop rhythm   Respiratory - Normal Respiratory Effort, Normal breath sounds,  Crepitationsleft base,  no wheeze  and  no forced expiratory wheeze    Peripheral pitting pedal edema-- none , no calf pain   Gastrointestinal -Soft abdomen, No palpable masses, Non Tender,Liver,Spleen not palpable. No-- free fluid and shifting dullness  Musculoskeletal- No finger Clubbing. Strength 5/r Rt U.LE-   Left upper ,lower - 0-1/5  Lymphatic-No Palpable cervical, axillary,Inguinal lymphadenopathy   Psychiatric - normal effect,Orientation  Rt Dorsalis pedis pulses-palpable    Lt " Dorsalis pedis pulses- palpable   Rt Posterior tibial pulses -palpable   Left posterior tibial pulses -palpable   Miscellaneous -  no renal bruit,  bowel sounds positive  and  examined on wheel chair   Investigations  Lab and Imaging have been reviewed in [facility].    Review of Medicine tests    EKG- I had independently reviewed the EKG from--8/9/2018   It was reported to be showing     Normal sinus rhythm with sinus arrhythmia  Abnormal ECG  When compared with ECG of 23-JUL-2018 18:34,  No significant change was found      Echo Feb 2018     1 - Severe left atrial enlargement.     2 - Concentric hypertrophy.     3 - No wall motion abnormalities.     4 - Normal left ventricular systolic function (EF 60-65%).     5 - Impaired LV relaxation, elevated LAP (grade 2 diastolic dysfunction).     6 - Pulmonary hypertension. The estimated PA systolic pressure is greater than 41 mmHg.     7 - Mild to moderate mitral regurgitation.     8 - Small pericardial effusion.     April 2016     There is 20 - 39% right Internal Carotid stenosis.  There is 20 - 39% left Internal Carotid stenosis.    2008 - no evidence of stress induced myocardial ischemia      Review of clinical lab tests:  Lab Results   Component Value Date    CREATININE 3.9 (H) 08/31/2018    HGB 8.7 (L) 08/31/2018     08/31/2018           Review of old records- Was done and information gathered regards to events leading to surgery and health conditions of significance in the perioperative period.    Outpatient Subjective & Objective

## 2018-09-20 NOTE — ASSESSMENT & PLAN NOTE
As per chart review-.  Pt's  reports that pt's B/P is taken daily and recorded - Pt's  reports that it has been running 125-135/70's  Hypertension-  Blood pressure is acceptable . I suggest continuation of Coreg, Nifedipine, Hydralazine  during the entire perioperative period. I suggest addressing pain control as uncontrolled pain can increased blood pressure

## 2018-09-20 NOTE — ASSESSMENT & PLAN NOTE
Type 2 Diabetes Mellitus  Currently not On treatment   Hemoglobin A1c- 5.7- 8/31/2018   Capillary glucose check-None   No tingling , numbness , hands, feet

## 2018-09-20 NOTE — ASSESSMENT & PLAN NOTE
Was using CPAP until had the stroke and had part of the skull removed to accommodate the edema  She since had the skull put back   Suggested follow up for possible reinitiating CPAP  Slow to recover from anesthesia

## 2018-09-20 NOTE — ASSESSMENT & PLAN NOTE
Feb 2018   In the setting of fluid overload , Hypoxia  Pulmonary hypertension. The estimated PA systolic pressure is greater than 41 mmHg

## 2018-09-20 NOTE — PATIENT INSTRUCTIONS
Your surgery has been scheduled for:___Pending Date___     You should report to:  _____[facility], located on the [place] side of the first floor of the [facility] ([phone])  ___X___[facility], located on the [facility] side of the Second floor of the [facility] ([phone])  ______3rd Floor [facility] located on the [facility] side of the [facility] [phone]    Please Note  -Tell your doctors if you take asprin, products containing aspirin, herbal medications or blood thinners, such as Coumadin, Ticlid, or Plavix. (Consult your provider regarding holding or stopping before surgery).  -Arrange for someone to drive you home following surgery. You will not be allowed to leave the surgical facility alone or drive yourself home following sedation and anesthesia.      Outpatient Encounter Medications as of 9/20/2018   Medication Sig Note Dispense Refill    acetaminophen (TYLENOL) 325 MG tablet Take 2 tablets (650 mg total) by mouth every 6 (six) hours as needed for Pain. 9/20/2018: Take as needed  0    albuterol (PROVENTIL) 2.5 mg /3 mL (0.083 %) nebulizer solution Take 3 mLs (2.5 mg total) by nebulization every 6 (six) hours as needed for Wheezing. Rescue 9/20/2018: Use as needed 25 each 3    alprazolam (XANAX ORAL) Take by mouth as needed (anxiety). 9/20/2018: Take as needed      aspirin (ECOTRIN) 81 MG EC tablet Take 81 mg by mouth every morning.  9/20/2018: Continue to take for surgery per Dr Man      atorvastatin (LIPITOR) 40 MG tablet TAKE 1 TABLET ONE TIME DAILY FOR CHOLESTEROL (Patient taking differently: Take 40 mg by mouth every evening. TAKE 1 TABLET ONE TIME DAILY FOR CHOLESTEROL) 9/20/2018: Take as sched PM 90 tablet 2    carvedilol (COREG) 25 MG tablet Take 1 tablet (25 mg total) by mouth 2 (two) times daily. 9/20/2018: Take AM of surgery 180 tablet 2    DULCOLAX, BISACODYL, ORAL Take by mouth as  needed (constipation). 9/20/2018: Hold AM of surgery      ergocalciferol (ERGOCALCIFEROL) 50,000 unit Cap 1 capsule (50,000 Units total) by Per G Tube route every 7 days. (Patient taking differently: Take 50,000 Units by mouth every 7 days. Takes on Thurs) 9/20/2018: Hold 7 days prior to surgery 12 capsule 2    famotidine (PEPCID) 20 MG tablet Take 1 tablet (20 mg total) by mouth once daily. (Patient taking differently: Take 20 mg by mouth every morning. ) 9/20/2018: Take AM of surgery 90 tablet 2    ferrous sulfate 220 mg (44 mg iron)/5 mL solution 10ml daily for Iron/Give via PEG (Patient taking differently: Take 220 mg by mouth every morning. ) 9/20/2018: Hold AM of surgery 300 mL 6    folic acid (FOLVITE) 1 MG tablet Take 1 tablet (1 mg total) by mouth once daily. (Patient taking differently: Take 1 mg by mouth every morning. ) 9/20/2018: Hold 7 days prior to surgery 90 tablet 2    furosemide (LASIX) 40 MG tablet Take 1 tablet (40 mg total) by mouth once daily. (Patient taking differently: Take 40 mg by mouth every morning. ) 9/20/2018: Hold AM of surgery 90 tablet 2    hydrALAZINE (APRESOLINE) 100 MG tablet Take 1 tablet (100 mg total) by mouth 3 (three) times daily. 9/20/2018: Take AM of surgery 30 tablet 0    levETIRAcetam (KEPPRA) 500 MG Tab Take 1 tablet (500 mg total) by mouth every 8 (eight) hours. 9/20/2018: Take AM of surgery 270 tablet 2    NIFEdipine (ADALAT CC) 90 MG TbSR Take 1 tablet (90 mg total) by mouth once daily. (Patient taking differently: Take 90 mg by mouth every morning. ) 9/20/2018: Take AM of surgery 90 tablet 2    polyethylene glycol 3350 (MIRALAX ORAL) Take by mouth every evening.  9/20/2018: Take as sched PM      sodium bicarbonate 650 MG tablet Take 2 tablets (1,300 mg total) by mouth 2 (two) times daily. 9/20/2018: Hold AM of surgery 360 tablet 2    traZODone (DESYREL) 100 MG tablet Take 1 tablet (100 mg total) by mouth every evening. 9/20/2018: Take as sched PM 90  "tablet 1    BD INSULIN PEN NEEDLE UF SHORT 31 gauge x 5/16" Ndle USE TO INJECT NOVOLOG FLEXPEN BEFORE MEALS  150 each 11    blood sugar diagnostic (ACCU-CHEK SMARTVIEW TEST STRIP) Strp 1 strip by Misc.(Non-Drug; Combo Route) route 2 (two) times daily.  300 each 3    cyclobenzaprine (FLEXERIL) 5 MG tablet 2 tabs to start and then 1 tab Q8 hours as needed for muscle spasm 9/20/2018: Take as needed 30 tablet 0    pregabalin (LYRICA) 25 MG capsule 1 cap in AM and 2 caps at Bedtime for muscle spasm 9/20/2018: Currently out/May take AM of surgery is able to get another RX prior to surgery 270 capsule 2    sevelamer carbonate (RENVELA) 800 mg Tab Take 800 mg by mouth 3 (three) times daily with meals. 9/20/2018: Not stated yet       Facility-Administered Encounter Medications as of 9/20/2018   Medication Dose Route Frequency Provider Last Rate Last Dose                    Please review the instructions regarding your above listed medications.    Before Surgery  -Stop taking all herbal medications 7 days prior to surgery  -Stop taking aspirin, products containing aspirin 7 days before surgery  -Stop taking blood thinners   days before surgery  -Refrain from drinking alcoholic beverages for 24 hours before and after surgery  -Stop or limit smoking   days before surgery    Night before Surgery (or as per your Surgeon)  -Stop ALL solid food, gum, candy 8 hours before surgery/procedure time.  -Stop all CLOUDY liquids: coffee with creamer, tube feeds, cloudy juices, 6 hours prior to surgery/procedure time.  -CLEAR liquids include only water, black coffee NO creamer, clear oral rehydration drinks, clear sports drinks or clear fruit juices (no orange juice, no pulpy juices, no apple cider)  -IF IN DOUBT, drink water instead.   -Take a shower or bath (shower is recommended). Bathe with Hibiciens soap or an antibacterial soap from the neck down. If not supplied by your surgeon, Hibiciens soap will need to be purchased over the " counter in the pharmacy. Rinse soap off thoroughly.  -Shampoo your hair with your regular shampoo    The Day of Surgery (or as per your Surgeon)  -The patient should be ENCOURAGED to drink carbohydrate-rich clear liquids (sports drinks, clear juices) until 2 hours prior to surgery/procedure time.  -NOTHING TO DRINK 2 hours before to surgery/procedure time. If you are told to take medication on the morning of surgery, it may be taken with a sip of water.   -Take another bath or shower with Hibiciens or any antibacterial soap, to reduce the chance of infection.  -Take heart and blood pressure medications with a small sip of water, as advised by the perioperative team.  -Do not take fluid pills  -You may brush your teeth and rinse your mouth, but do not swallow any additional water.  -Do not apply perfumes, powder, body lotions, or deodorant on the day of surgery.  -Nail polish should be removed  -Do not wear makeup or moisturizer  -Wear comfortable clothes, such as a button front shirt and loose fitting pants.  -Leave all jewelry, including body piercings, and valuables at home.  -Bring any devices you will need after surgery such as crutches or canes.  -If you have sleep apnea, please bring your CPAP machine    In the event of your physical conditions including the onset of a cold or respiratory illness, or if you have to delay or cancel your surgery, please notify your surgeon.     If you have any questions or concerns, please don't hesitate to call.    Sincerely,    Judi [phone]

## 2018-09-20 NOTE — LETTER
September 20, 2018      Beverly Muniz MD  [address]  [place] [address]           [facility]  [address] [facility] [address]  Phone: [phone]          Patient: Lucy Perez   MR Number: [id number]   YOB: 1960   Date of Visit: 9/20/2018       Dear Dr. Rhonda G Leopold:    Thank you for referring Lucy Perez to me for evaluation. Attached you will find relevant portions of my assessment and plan of care.    If you have questions, please do not hesitate to call me. I look forward to following Lucy Perez along with you.    Sincerely,    Mee Man MD    Enclosure  CC:  Rhonda G Leopold, MD    If you would like to receive this communication electronically, please contact [email or web address] or [phone] to request more information on [facility] [employer] access.    For providers and/or their staff who would like to refer a patient to [facility], please contact us through our one-stop-shop provider referral line, [facility], at [phone].    If you feel you have received this communication in error or would no longer like to receive these types of communications, please e-mail [email or web address]

## 2018-09-20 NOTE — ASSESSMENT & PLAN NOTE
Had blood transfusion in Aug [address]75 , when was commenced on  Dialysis  Will review dialysis unit labs   Anemia:  I suggest monitoring her Hemoglobin perioperatively in view of her history of pre existing anemia.  Was due to see Hematologist on 9/17 /2018 - Planning on rescheduling   Got IV Iron

## 2018-09-20 NOTE — PROGRESS NOTES
[facility] - Pre Op Consult  Progress Note    Patient Name: Lucy Perez  MRN: [id number]  Date of Evaluation- 12/18/2018  PCP- Beverly Muniz MD        HPI:  History of present illness- I had the pleasure of meeting this pleasant 58 y.o. lady in the pre op clinic prior to her elective possibke gall bladder removal surgery. The patient is new to me . Lucy was accompanied by daughter Reina.    I have obtained the history by speaking to the patient and by reviewing the electronic health records.    Events leading up to surgery / History of presenting illness -    She presented to  the Emergency Room on August 25th 2018 with  severe right flank to the Rt and Left upper quadrant  pain.    Pain also went down to the Gluteal area  Colicky pain   Was in the hospital 2 weeks prior to that for abnormal kidney  Function and was commenced on dialysis , The above pain started while in the hospital    No associated nausea, vomiting , diarrea  CT renal stone study  showed - Mild gallbladder wall thickening noting possible cholelithiasis/sludge. Constipation  No definite nephrolithiasis  Had ultra sound scan that showed -Gallbladder sludge and multiple small gallstones without convincing sonographic evidence of cholecystitis.  Was discharged from ER   Since then ,, she adjusted her diet , eating less fried foods and spicy food . No longer had similar pains .  Her pain has improved    Relevant health conditions of significance for the perioperative period/ History of presenting illness -    Was doing relatively well until she had the hemorrhagic stroke in 2016 , which was a significant event     Patient Active Problem List    Diagnosis Date Noted    Gall stones 09/20/2018    ESRD (end stage renal disease) 08/11/2018              Hemorrhagic cerebrovascular accident (CVA) 01/03/2018              Essential hypertension 08/10/2017    Anemia in ESRD (end-stage renal disease) 08/02/2017    Controlled type 2  diabetes mellitus with chronic kidney disease on chronic dialysis, without long-term current use of insulin 08/02/2017    Constipation 06/16/2017    Partial symptomatic epilepsy with complex partial seizures, not intractable, without status epilepticus 06/06/2017         Left spastic hemiparesis 10/22/2016         Left ventricular diastolic dysfunction with preserved systolic function 12/15/2015    Pulmonary hypertension 12/15/2015    Sarcoidosis 11/26/2013              Hyperlipidemia 03/05/2013    Mixed anxiety and depressive disorder 03/05/2013    THERESA (obstructive sleep apnea) 03/05/2013         Subjective/ Objective:          Chief complaint-Preoperative evaluation, Perioperative Medical management, complication reduction plan     Active cardiac conditions- none    Revised cardiac risk index predictors- coronary artery disease, history of heart failure, history of cerebrovascular disease and creatinine more than 2    Functional capacity -Examples of physical activity, was active until 2016 , until had the stroke, was able to take a flight of stairs until that time . Currently limited.She can undertake all the above activities without  chest pain,chest tightness,  ,dizziness,lightheadedness making her exercise tolerance , less  than 4 Mets.   Winded on exertion, not new,long standing  ,stable over time     Review of Systems   Constitutional: Negative for chills and fever.            Weight loss- from the stroke, gained some back      HENT:        Sleep apnea   Eyes:        Bleeding - Left eye   Diabetes   visual changes   Respiratory:        No cough , phlegm  No Hemoptysis   Cardiovascular:        As noted   Gastrointestinal:        No overt GI/ blood losses  Bowel movements- constipated   Endocrine:        Prednisone use > 20 mg daily for 3 weeks- None   Genitourinary: Negative for dysuria.        No urinary hesitancy    Musculoskeletal:        As above      Skin: Negative for rash.   Neurological:  "Negative for syncope.   Hematological:          Current use of Antiplatelet agents     Psychiatric/Behavioral:          Anxiety -dialysis days           Past Surgical History:   Procedure Laterality Date    ARTHROSCOPY-SHOULDER WITH SUBACROMIAL DECOMPRESSION Right 7/9/2014    [facility] at [facility]    Biceps Tenotomy Right 7/9/2014    Per[facility]all [facility]ja [facility] at [facility] OR    BREAST SURGERY      breast reduction    COLONOSCOPY N/A 8/11/2016    Procedure: COLONOSCOPY;  Surgeon: Jerry Vilchis MD;  Location: [facility] ENDO (4TH FLR);  Service: Endoscopy;  Laterality: N/A;  Patient reports difficulty awaking from anesthesia in the past.    COLONOSCOPY N/A 8/11/2016    Performed by Jerry [facility] at [facility] (4TH FLR)    DEBRIDEMENT-SHOULDER-ARTHROSCOPIC Labral Cuff Right 7/9/2014    Per[facility] by Kendall [facility]ja [facility] at [facility] OR    ESOPHAGOGASTRODUODENOSCOPY (EGD) N/A 12/6/2017    Performed by Dallas Lock Jr., [facility] at [facility] ENDO    SOSCDWEX-DXTIMYYL-RLQGJQ END Right 7/9/2014    Per[facility]all [facility]ja [facility] at [facility] OR    HYSTERECTOMY      ROTATOR CUFF REPAIR Right July 9, 2014    right side    Skull surgery      Aneurysm    stent placed      in vertebral artery    TUBAL LIGATION         No  bleeding, cardiac problems, PONV with previous surgeries/procedures.  Medications and Allergies reviewed in epic.   FH- No anesthesia,bleeding / venous thrombosis ,in family   Lives with  , who can help     Physical Exam  Height 5' 3" (1.6 m), weight 98.5 kg (217 lb 2.5 oz).      Physical Exam  Constitutional- Vitals - Body mass index is 38.47 kg/m²., There were no vitals filed for this visit.  General appearance-Conscious,Coherent  Eyes- No conjunctival icterus,pupils  round  and  Bilateral cataracts  ENT-Oral cavity- moist  , Hearing grossly normal   Neck- No thyromegaly ,Trachea -central, No jugular venous distension,   No Carotid Bruit   Cardiovascular -Heart " Sounds- Normal  and  no murmur   , No gallop rhythm   Respiratory - Normal Respiratory Effort, Normal breath sounds,  Crepitationsleft base,  no wheeze  and  no forced expiratory wheeze    Peripheral pitting pedal edema-- none , no calf pain   Gastrointestinal -Soft abdomen, No palpable masses, Non Tender,Liver,Spleen not palpable. No-- free fluid and shifting dullness  Musculoskeletal- No finger Clubbing. Strength 5/r Rt U.LE-   Left upper ,lower - 0-1/5  Lymphatic-No Palpable cervical, axillary,Inguinal lymphadenopathy   Psychiatric - normal effect,Orientation  Rt Dorsalis pedis pulses-palpable    Lt Dorsalis pedis pulses- palpable   Rt Posterior tibial pulses -palpable   Left posterior tibial pulses -palpable   Miscellaneous -  no renal bruit,  bowel sounds positive  and  examined on wheel chair   Investigations  Lab and Imaging have been reviewed in epic.    Review of Medicine tests    EKG- I had independently reviewed the EKG from--8/9/2018   It was reported to be showing     Normal sinus rhythm with sinus arrhythmia  Abnormal ECG  When compared with ECG of 23-JUL-2018 18:34,  No significant change was found      Echo Feb 2018     1 - Severe left atrial enlargement.     2 - Concentric hypertrophy.     3 - No wall motion abnormalities.     4 - Normal left ventricular systolic function (EF 60-65%).     5 - Impaired LV relaxation, elevated LAP (grade 2 diastolic dysfunction).     6 - Pulmonary hypertension. The estimated PA systolic pressure is greater than 41 mmHg.     7 - Mild to moderate mitral regurgitation.     8 - Small pericardial effusion.     April 2016     There is 20 - 39% right Internal Carotid stenosis.  There is 20 - 39% left Internal Carotid stenosis.    2008 - no evidence of stress induced myocardial ischemia      Review of clinical lab tests:  Lab Results   Component Value Date    CREATININE 3.9 (H) 08/31/2018    HGB 8.7 (L) 08/31/2018     08/31/2018           Review of old records- Was  done and information gathered regards to events leading to surgery and health conditions of significance in the perioperative period.        Preoperative cardiac risk assessment-  The patient does not have any active cardiac conditions . Revised cardiac risk index predictors- -4--.Functional capacity is less than 4 Mets. She will be undergoing a Abdominal procedure that carries a intermediate risk ( if laparoscopic)     The estimated risk of the rate of adverse cardiac outcomes  11%    No further cardiac work up is indicated prior to proceeding with the surgery     Orders Placed This Encounter    X-Ray Cervical Spine AP Lat with Flexion  Extension       American Society of Anesthesiologists Physical status classification ( ASA ) class: 3   Postoperative pulmonary complication risk assessment:      ARISCAT ( Canet) risk index- risk class -  Low, if duration of surgery is under 2 hours, intermediate, if duration of surgery is over 2 hours (assumed to be laparoscopic)         Assessment/Plan:     Essential hypertension  As per chart review-.  Pt's  reports that pt's B/P is taken daily and recorded - Pt's  reports that it has been running 125-135/70's  Hypertension-  Blood pressure is acceptable . I suggest continuation of Coreg, Nifedipine, Hydralazine  during the entire perioperative period. I suggest addressing pain control as uncontrolled pain can increased blood pressure         Gall stones  Was referred to Surgery   For possible cholecystectomy            Hemorrhagic cerebrovascular accident (CVA)  2016  History of R-MCA stroke with R putaminal hemorrhagic transformation and residual L-sided deficits  post hemicraniectomy  Progressing well  PEG tube discontinued  Speech much better   Was getting home therapy until last week   Treated bernard herrera   To her understanding it is aneurysmal and was intervened             ESRD (end stage renal disease)  She is tolerating  hemodialysis well .   This was  started in first half of August with the patient's admission with uremia.    Goes  3 days a week for dialysis- M/W/F  -I suggest checking the electrolytes on presentation to the surgery to ensure that the electrolytes are appropriate to proceed with surgery. I suggest checking the electrolytes closely . I suggest monitoring the fluid status so that fluid overload can be avoided . Please note that the patient may be at increased risk of bleeding from platelet dysfunction from renal failure . I suggest involving the nephrology team for on going dialysis treatment   Suggested getting dialyzed the day before surgery       Acute respiratory failure with hypoxia  Acute hypoxemic respiratory failure  Feb 2018  volume overload            THERESA (obstructive sleep apnea)  Was using CPAP until had the stroke and had part of the skull removed to accommodate the edema  She since had the skull put back   Suggested follow up for possible reinitiating CPAP  Slow to recover from anesthesia      Constipation  Constipation- I suggest giving laxative regimen as opioid use,reduced ambulation  can increase the constipation     Controlled type 2 diabetes mellitus with chronic kidney disease on chronic dialysis, without long-term current use of insulin  Type 2 Diabetes Mellitus  Currently not On treatment   Hemoglobin A1c- 5.7- 8/31/2018   Capillary glucose check-None   No tingling , numbness , hands, feet       Anemia in ESRD (end-stage renal disease)  Had blood transfusion in Aug 39427 , when was commenced on  Dialysis  Will review dialysis unit labs   Anemia:  I suggest monitoring her Hemoglobin perioperatively in view of her history of pre existing anemia.  Was due to see Hematologist on 9/17 /2018 - Planning on rescheduling   Got IV Iron          Sarcoidosis  Had pulmonary involvement    No recent problem     Left ventricular diastolic dysfunction with preserved systolic function  Diastolic Dysfunction:  I  suggest watching her fluid  "status perioperatively and to watch out for fluid overload in view of her Diastolic Dysfunction.  I suggest avoidance of the ordering of continuous IV fluids and if IV fluids are required ,to order for a specific duration of time and consider ordering lower IV fluid rate     Hyperlipidemia  HLD-I  suggest continuation of statin during the entire perioperative period.    Pulmonary hypertension  Feb 2018   In the setting of fluid overload , Hypoxia  Pulmonary hypertension. The estimated PA systolic pressure is greater than 41 mmHg    Mixed anxiety and depressive disorder  Care with Xanax suggested    Partial symptomatic epilepsy with complex partial seizures, not intractable, without status epilepticus  Controled on Keppra   Suggested Avoidance of Tramadol that can lowest seizure threshold     Left spastic hemiparesis  Not able to take  Lyrica, due to insurance issues    Rheumatoid arthritis involving multiple sites  No recent problem   Will check C spine for C1- C2 subluxation , given RA and need for General anesthesia   --  12/18/2018-     Cervical x ray done done on 9/20/2018  , to evaluate for Atlanto axial subluxation for dev op planning ( Intubation ) , given Rheumatoid arthritis   It reportedly showed No subluxation with flexion extension    Coronary angioplasty status  In the late 90's  Events leading - " blacking out "   No chest pain  No History of CABG.History of stenting  No chest pain    ASA - with history of  Stenting.  I have discussed the  risk of stent thrombosis with interruption of Aspirin regimen .Risk ( bleeding ) ,  benefits about perioperative use of  ASA  discussed      Soft tissue swelling  Left axilla  Possible Previus hidaredinitis   Suggested follow up   Discussing with Surgeon          Preventive perioperative care    Thromboembolic prophylaxis:  Her risk factors for thrombosis include obesity, surgical procedure, age and reduced mobility.I suggest  thromboembolic prophylaxis ( " "mechanical/pharmacological, weighing the risk benefits of pharmacological agent use considering dev procedural bleeding )  during the perioperative period.I suggested being active in the post operative period.      Postoperative pulmonary complication prophylaxis-Risk factors for post operative pulmonary complications include sleep apnmea ASA class >2, tobacco use and proximity of the surgical site to the lungs- I suggest incentive spirometry use, early ambulation, end tidal carbon dioxide monitoring and pain control so as to avoid diaphragmatic splinting , oral care, head end of bed elevation      Renal complication prophylaxis-Risk factors for renal complications include pre-existing renal disease, diabetes mellitus, hypertension and vascular disease . I suggest keeping her optimally  hydrated and avoidance/ minimizing the use of  NSAID's,CHARLES 2 Inhibitors ,IV contrast if possible in the perioperative period     Surgical site Infection Prophylaxis-I  suggest appropriate antibiotic for Prophylaxis against Surgical site infections     This visit was focused on Preoperative evaluation, Perioperative Medical management, complication reduction plans. I suggest that the patient follows up with primary care or relevant sub specialists for ongoing health care.    I appreciate the opportunity to be involved in this patients care. Please feel free to contact me if there were any questions about this consultation.    Patient is pending optimization    Mee Man MD  Perioperative Medicine  [facility]   Pager [phone]  ----  9/20- 15 49     /76 Comment: rt  Pulse 64   Temp 97.3 °F (36.3 °C) (Oral)   Ht 5' 3" (1.6 m)   Wt 98.5 kg (217 lb 2.5 oz) Comment: stated per  from dialysis yesterday  LMP  (LMP Unknown)   SpO2 98%   BMI 38.47 kg/m²    C spine x ray - There are hypertrophic degenerative change at C1-2.  Degenerative change C4-5 and C5-6.  Inferior aspect of C6 and C7 not " well visualized.  No subluxation with flexion extension.  Postop change in the cranium.  Vascular calcifications noted.  Odontoid appears intact  ----  9/21- 7 46     Spoke to Radiology/ Ultra sound   Plan for Ultras sound left axilla to evaluate Left axillary swelling   ----  10/3- 7 50     Ultra sound scan showed- Small complex cyst versus fluid collection in the subcutaneous tissues of the left axilla.  This is nonspecific but could represent a sebaceous cyst, necrotic lymph node, seroma, hematoma or microabscess.  ---  10/11- 19 17     Called to follow up   Spoke to daughter about US  Suggested follow up regarding the left axillary swelling   Mammogram 10/2/2018 - No mammographic evidence of malignancy  Sleep evaluation 10/30   ---  11/16- 19 47   Having vascular surgery evaluation  11/29   Spoke to her daughter Reina   As per daughter , does not have Left axilla swelling   Daughter will  try  to be there for vascular surgery evaluation  On 11/29 - will discuss with surgeon at that visit about the axillary swelling   /70's  On Coreg  ---  11/29- 7 24     Having vascular surgery evaluation today   Will request surgeon about the Left axillary swelling that I found on exam - Messaged    ---  12/7- 8 39     Was seen in vascular clinic on 11/29   Message from surgeon regarding the Left axillary area from 11/29 - suggests resolution of the  swelling   Left a message for patient for follow up regarding her health care   ---  12/18- 10 52    Lab from 12/8/2018- Hb 11.5

## 2018-09-20 NOTE — HPI
History of present illness- I had the pleasure of meeting this pleasant 58 y.o. lady in the pre op clinic prior to her elective possibke gall bladder removal surgery. The patient is new to me . Lucy was accompanied by daughter Reina.    I have obtained the history by speaking to the patient and by reviewing the electronic health records.    Events leading up to surgery / History of presenting illness -    She presented to  the Emergency Room on August 25th 2018 with  severe right flank to the Rt and Left upper quadrant  pain.    Pain also went down to the Gluteal area  Colicky pain   Was in the hospital 2 weeks prior to that for abnormal kidney  Function and was commenced on dialysis , The above pain started while in the hospital    No associated nausea, vomiting , diarrea  CT renal stone study  showed - Mild gallbladder wall thickening noting possible cholelithiasis/sludge. Constipation  No definite nephrolithiasis  Had ultra sound scan that showed -Gallbladder sludge and multiple small gallstones without convincing sonographic evidence of cholecystitis.  Was discharged from ER   Since then ,, she adjusted her diet , eating less fried foods and spicy food . No longer had similar pains .  Her pain has improved    Relevant health conditions of significance for the perioperative period/ History of presenting illness -    Was doing relatively well until she had the hemorrhagic stroke in 2016 , which was a significant event     Patient Active Problem List    Diagnosis Date Noted    Gall stones 09/20/2018    ESRD (end stage renal disease) 08/11/2018              Hemorrhagic cerebrovascular accident (CVA) 01/03/2018              Essential hypertension 08/10/2017    Anemia in ESRD (end-stage renal disease) 08/02/2017    Controlled type 2 diabetes mellitus with chronic kidney disease on chronic dialysis, without long-term current use of insulin 08/02/2017    Constipation 06/16/2017    Partial symptomatic  epilepsy with complex partial seizures, not intractable, without status epilepticus 06/06/2017         Left spastic hemiparesis 10/22/2016         Left ventricular diastolic dysfunction with preserved systolic function 12/15/2015    Pulmonary hypertension 12/15/2015    Sarcoidosis 11/26/2013              Hyperlipidemia 03/05/2013    Mixed anxiety and depressive disorder 03/05/2013    THERESA (obstructive sleep apnea) 03/05/2013

## 2018-09-20 NOTE — ASSESSMENT & PLAN NOTE
2016  History of R-MCA stroke with R putaminal hemorrhagic transformation and residual L-sided deficits  post hemicraniectomy  Progressing well  PEG tube discontinued  Speech much better   Was getting home therapy until last week   Treated bernard herrera   To her understanding it is aneurysmal and was intervened

## 2018-09-20 NOTE — ASSESSMENT & PLAN NOTE
"In the late 90's  Events leading - " blacking out "   No chest pain  No History of CABG.History of stenting  No chest pain    ASA - with history of  Stenting.  I have discussed the  risk of stent thrombosis with interruption of Aspirin regimen .Risk ( bleeding ) ,  benefits about perioperative use of  ASA  discussed    "

## 2018-09-20 NOTE — ASSESSMENT & PLAN NOTE
No recent problem   Will check C spine for C1- C2 subluxation , given RA and need for General anesthesia   --  12/18/2018-     Cervical x ray done done on 9/20/2018  , to evaluate for Atlanto axial subluxation for dev op planning ( Intubation ) , given Rheumatoid arthritis   It reportedly showed No subluxation with flexion extension

## 2018-09-20 NOTE — ASSESSMENT & PLAN NOTE
She is tolerating  hemodialysis well .   This was started in first half of August with the patient's admission with uremia.    Goes  3 days a week for dialysis- M/W/F  -I suggest checking the electrolytes on presentation to the surgery to ensure that the electrolytes are appropriate to proceed with surgery. I suggest checking the electrolytes closely . I suggest monitoring the fluid status so that fluid overload can be avoided . Please note that the patient may be at increased risk of bleeding from platelet dysfunction from renal failure . I suggest involving the nephrology team for on going dialysis treatment   Suggested getting dialyzed the day before surgery

## 2018-09-21 ENCOUNTER — OUTPATIENT CASE MANAGEMENT (OUTPATIENT)
Dept: ADMINISTRATIVE | Facility: OTHER | Age: 58
End: 2018-09-21

## 2018-09-21 ENCOUNTER — TELEPHONE (OUTPATIENT)
Dept: PHARMACY | Facility: CLINIC | Age: 58
End: 2018-09-21

## 2018-09-21 ENCOUNTER — TELEPHONE (OUTPATIENT)
Dept: INTERNAL MEDICINE | Facility: CLINIC | Age: 58
End: 2018-09-21

## 2018-09-21 DIAGNOSIS — G47.33 OSA (OBSTRUCTIVE SLEEP APNEA): Primary | ICD-10-CM

## 2018-09-21 NOTE — TELEPHONE ENCOUNTER
Haritha Carranza, KARINA  [facility] [place] [facility] Staff   Caller: Conchita Carranza RN - Outpatient Care Management (Today, 10:41 AM)             Good Morning,     This morning while doing a med rec with the above pt enrolled in Outpatient Care Management the following discrepancies were revealed:     Trazodone 100 mg q evening - pt not taking   Novolog insulin pen before meals - pt  reports that had not had to use it as pt.'s blood sugar has been running 135-150 stating that they are only to use it if pt.'s blood sugar goes over 200.     This is FYI - as we cannot adjust the MAR .  Thank you for your time and attention in this matter.     Best Regards,     Conchita ([place]) Dillon MUHAMMAD BSN RN   Outpatient Care Management   [phone]      FYI

## 2018-09-21 NOTE — TELEPHONE ENCOUNTER
----- Message from Vickie Martinez sent at 9/21/2018 10:00 AM CDT -----  Contact: Reina/ daughter/ [phone]  Patient's daughter is requesting if doctor can tell her who her mother saw for sleep apnea.     Patient's daughter does not know how to use the machine, and states her mother needs it, and she would like for her to see the same doctor again.    Please advise, thank you

## 2018-09-21 NOTE — TELEPHONE ENCOUNTER
Haritha Carranza, KARINA  [facility] [place] [facility]   Caller: Conchita Carranza RN - Outpatient Care Management (Today, 10:41 AM)             Good Morning,     This morning while doing a med rec with the above pt enrolled in Outpatient Care Management the following discrepancies were revealed:     Trazodone 100 mg q evening - pt not taking   Novolog insulin pen before meals - pt  reports that had not had to use it as pt.'s blood sugar has been running 135-150 stating that they are only to use it if pt.'s blood sugar goes over 200.     This is FYI - as we cannot adjust the MAR .  Thank you for your time and attention in this matter.     Best Regards,     Conchita ([place]) Dillon MUHAMMAD BSN RN   Outpatient Care Management   [phone]      FYI

## 2018-09-21 NOTE — PROGRESS NOTES
Summary:  Spoke with Pt and  on follow-up - med rec completed againe to see if medication has changed since dialysis. Pt  reports that pt BS has been running 135-150 so they had not been using the insulin pen before meals - stating they are only to use it if pt. BS goes over 200.   Pt's  reports that pt takes Xanax for anxiety prior to dialysis reporting that pt's mother was on dialysis for years and pt believes that was a part of her mothers dying.   Pt  reports that pt is doing well and adapting to the renal diet - pt stated still the hardest thing is the fluid restriction of 16 oz per day but she is trying to adhere to that.   Pt and  report no falls os slipping from W/C - pt's  states that when bathing he and his daughter usually give pt a bed bath, his son girlfriend come over a couple times a week to shower pt in shower using shower chair, and he assist with transfers to and from the W/C. Spoke with pt who thanked [facility] for calling and checking on her - pt was positive in the conversation.    Interventions:  In-basket message PCP regarding med rec discrepancies with insulin and Trazodone.  Suggested bath mats for in shower   Praised pt for her adherence to fluid restrictions    Plan:  Safety - follow-up on bath mats  Life style changes - continue to monitor and assess for comliance    Todays [facility] Self-Management Care Plan was developed with the patients/caregivers input and was based on identified barriers from todays assessment.  Goals were written today with the patient/caregiver and the patient has agreed to work towards these goals to improve his/her overall well-being. Patient verbalized understanding of the care plan, goals, and all of today's instructions. Encouraged patient/caregiver to communicate with his/her physician and health care team about health conditions and the treatment plan.  Provided my contact information today and encouraged  patient/caregiver to call me with any questions as needed.    Conchita Carranza RN OPCM

## 2018-09-21 NOTE — TELEPHONE ENCOUNTER
Damaris, please call pt's daughter, Reina and let her know that her Mom had her Sleep Study in 2011 and was started on CPAP 12-15cm by Stephaine Leggett in Neurology/Sleep clinic.  I have placed a referral to Sleep clinic so a follow up appt with DAVID Leggett can be scheduled.  Thanks

## 2018-09-25 ENCOUNTER — OUTPATIENT CASE MANAGEMENT (OUTPATIENT)
Dept: ADMINISTRATIVE | Facility: OTHER | Age: 58
End: 2018-09-25

## 2018-09-25 NOTE — PROGRESS NOTES
Summary:  1st Attempt to complete SW follow-up for Outpatient Care Management; left message requesting return call.  LCSW will reattempt at a later date.      Interventions:  None      Plan  Confirm receipt of mailing  Discuss if made contact with PACS/Financial Aid application  Discuss if staff made contact with pt for Pharm. Assistance  Discuss if made contact with Medicaid   Discuss if COA made contact with [employer]

## 2018-09-26 ENCOUNTER — TELEPHONE (OUTPATIENT)
Dept: VASCULAR SURGERY | Facility: CLINIC | Age: 58
End: 2018-09-26

## 2018-09-26 ENCOUNTER — TELEPHONE (OUTPATIENT)
Dept: INTERNAL MEDICINE | Facility: CLINIC | Age: 58
End: 2018-09-26

## 2018-09-26 NOTE — PROGRESS NOTES
Patient, Lucy Perez (MRN #[id number]), presented with a recorded BMI of 38.47 kg/m^2 and a documented comorbidity(s):  - Diabetes Mellitus Type 2  - Obstructive Sleep Apnea  - Hypertension  - Hyperlipidemia  - Atrial Fibrillation  to which the severe obesity is a contributing factor. This is consistent with the definition of severe obesity (BMI 35.0-35.9) with comorbidity (ICD-10 E66.01, Z68.35). The patient's severe obesity was monitored, evaluated, addressed and/or treated. This addendum to the medical record is made on 09/26/2018.

## 2018-09-26 NOTE — TELEPHONE ENCOUNTER
----- Message from [employer] sent at 9/26/2018 11:39 AM CDT -----  Contact: Keira Beltrán states she need to schedule a consult for pt to get access for dialysis.    Please call [phone]

## 2018-09-27 ENCOUNTER — OUTPATIENT CASE MANAGEMENT (OUTPATIENT)
Dept: ADMINISTRATIVE | Facility: OTHER | Age: 58
End: 2018-09-27

## 2018-09-27 NOTE — PROGRESS NOTES
Summary:  LCSW contacted pt for follow up. Pt stated she is doing fair. They did not contact Medicaid or PACS. Pt stated she did not receive the mailing and asked if it could be re-mailed to her. LCSW spoke with pt's  with her permission and provided contact information Medicaid and Pharm. Assistance. Mr. Perez stated they will call today. They denied other needs to be addressed at this time.     Interventions:  Medicaid benefits - contact information to complete telephonic assessment  Financial assistance - remailed PACS contact information/financial aid application  Pharm. Assistance - contact information for JOHANNA Perdue    Plan:  Confirm receipt of mailing  Discuss if made contact with PACS/Financial Aid application  Discuss if staff made contact with pt for Pharm. Assistance  Discuss if made contact with Medicaid   Discuss if COA made contact with [employer]    Todays [facility] Self-Management Care Plan was developed with the patients/caregivers input and was based on identified barriers from todays assessment.  Goals were written today with the patient/caregiver and the patient has agreed to work towards these goals to improve his/her overall well-being. Patient verbalized understanding of the care plan, goals, and all of today's instructions. Encouraged patient/caregiver to communicate with his/her physician and health care team about health conditions and the treatment plan.  Provided my contact information today and encouraged patient/caregiver to call me with any questions as needed.

## 2018-09-28 ENCOUNTER — TELEPHONE (OUTPATIENT)
Dept: INTERNAL MEDICINE | Facility: CLINIC | Age: 58
End: 2018-09-28

## 2018-09-28 NOTE — TELEPHONE ENCOUNTER
Aura, please schedule Mrs Ayala a  RTC Appt in January.  Thanks         Documentation      Patient scheduled for EP ppt/mailed out.

## 2018-10-02 ENCOUNTER — HOSPITAL ENCOUNTER (OUTPATIENT)
Dept: RADIOLOGY | Facility: HOSPITAL | Age: 58
Discharge: HOME | End: 2018-10-02
Attending: INTERNAL MEDICINE
Payer: MEDICARE

## 2018-10-02 ENCOUNTER — HOSPITAL ENCOUNTER (OUTPATIENT)
Dept: RADIOLOGY | Facility: HOSPITAL | Age: 58
Discharge: HOME | End: 2018-10-02
Attending: HOSPITALIST
Payer: MEDICARE

## 2018-10-02 ENCOUNTER — OUTPATIENT CASE MANAGEMENT (OUTPATIENT)
Dept: ADMINISTRATIVE | Facility: OTHER | Age: 58
End: 2018-10-02

## 2018-10-02 DIAGNOSIS — M79.89 SWELLING IN LEFT ARMPIT: ICD-10-CM

## 2018-10-02 DIAGNOSIS — Z12.39 BREAST CANCER SCREENING: ICD-10-CM

## 2018-10-02 DIAGNOSIS — R22.9 SOFT TISSUE SWELLING: ICD-10-CM

## 2018-10-02 PROCEDURE — 76882 US LMTD JT/FCL EVL NVASC XTR: CPT | Mod: 26,,, | Performed by: RADIOLOGY

## 2018-10-02 PROCEDURE — 76882 US LMTD JT/FCL EVL NVASC XTR: CPT | Mod: TC

## 2018-10-02 PROCEDURE — 77067 SCR MAMMO BI INCL CAD: CPT | Mod: TC

## 2018-10-02 PROCEDURE — 77067 SCR MAMMO BI INCL CAD: CPT | Mod: 26,,, | Performed by: RADIOLOGY

## 2018-10-02 NOTE — PROGRESS NOTES
Summary:  LCSW attempted to contact pt for follow-up. Pt stated she is getting her self prepped for several MD appointments today. Pt asked if the call be can be returned later today in between her last 2 appointments. LCSW will attempt to return the call around 1pm    LCSW attempted to return the call back to pt. There was no answer; left a voicemail asking for the call to be returned.     Interventions:  none    Plan:  Make 2nd attempt on 10/4.

## 2018-10-03 ENCOUNTER — OUTPATIENT CASE MANAGEMENT (OUTPATIENT)
Dept: ADMINISTRATIVE | Facility: OTHER | Age: 58
End: 2018-10-03

## 2018-10-03 NOTE — PROGRESS NOTES
"Summary:  Spoke with pt.regarding follow-up  - pt reports that her port has been bothering her stating that she feels like it is constantly pulling and that it is nerve-racking and uncomfortable.  .  Pt also reports that she had a mammogram yesterday and is waiting for a report.  Pt's husbands stated that they have an appointment tomorrow to see where the permanent port will go.  Pt.'s  reports that they received paperwork from Dialysis to apply for Medicaid.  Pt's  reported that dialysis did not help them fill it out.  Pt's  reported that he had received the packet of literature sent but he had not yet looked at it with pt.but stated that his daughter did go thru it - however she was not there at the moment  Pt denies any falls.  Pt also stated that she gets "fidgety" on dialysis days - but  stated he always makes sure she gets her Xanax prior to leaving for dialysis.  Pt states she is doing OK with the diet but still finds the fluid restriction difficult but is adhering to it.    Interventions:  Gave OPCM LCSW contact info for people who assist pt's with [place] Medicaid paperwork  Discussed sharing the diet info on the balance of CA and phosphorous, as well as choosing the right protein with their son and daughter who often cook for them  Discussed the importance of keeping the halls cleared for pt in W/C     Plan:  Follow -up call -discuss diet info  Review falls prevention - assess for a change made    Todays [facility] Self-Management Care Plan was developed with the patients/caregivers input and was based on identified barriers from todays assessment.  Goals were written today with the patient/caregiver and the patient has agreed to work towards these goals to improve his/her overall well-being. Patient verbalized understanding of the care plan, goals, and all of today's instructions. Encouraged patient/caregiver to communicate with his/her physician and health care team about health " conditions and the treatment plan.  Provided my contact information today and encouraged patient/caregiver to call me with any questions as needed.

## 2018-10-04 ENCOUNTER — OUTPATIENT CASE MANAGEMENT (OUTPATIENT)
Dept: ADMINISTRATIVE | Facility: OTHER | Age: 58
End: 2018-10-04

## 2018-10-04 ENCOUNTER — HOSPITAL ENCOUNTER (OUTPATIENT)
Dept: VASCULAR SURGERY | Facility: CLINIC | Age: 58
Discharge: HOME | End: 2018-10-04
Attending: SURGERY
Payer: MEDICARE

## 2018-10-04 ENCOUNTER — OFFICE VISIT (OUTPATIENT)
Dept: VASCULAR SURGERY | Facility: CLINIC | Age: 58
End: 2018-10-04
Payer: MEDICARE

## 2018-10-04 VITALS
HEART RATE: 55 BPM | WEIGHT: 176.69 LBS | TEMPERATURE: 98 F | HEIGHT: 63 IN | BODY MASS INDEX: 31.31 KG/M2 | SYSTOLIC BLOOD PRESSURE: 104 MMHG | DIASTOLIC BLOOD PRESSURE: 75 MMHG

## 2018-10-04 DIAGNOSIS — N18.6 ESRD (END STAGE RENAL DISEASE): Primary | ICD-10-CM

## 2018-10-04 DIAGNOSIS — Z01.818 PRE-OP EVALUATION: ICD-10-CM

## 2018-10-04 PROCEDURE — 99999 PR PBB SHADOW E&M-EST. PATIENT-LVL IV: CPT | Mod: PBBFAC,,, | Performed by: SURGERY

## 2018-10-04 PROCEDURE — 3008F BODY MASS INDEX DOCD: CPT | Mod: CPTII,,, | Performed by: SURGERY

## 2018-10-04 PROCEDURE — 93970 EXTREMITY STUDY: CPT | Mod: PBBFAC | Performed by: SURGERY

## 2018-10-04 PROCEDURE — 99205 OFFICE O/P NEW HI 60 MIN: CPT | Mod: S$PBB,,, | Performed by: SURGERY

## 2018-10-04 PROCEDURE — 99214 OFFICE O/P EST MOD 30 MIN: CPT | Mod: PBBFAC | Performed by: SURGERY

## 2018-10-04 PROCEDURE — 93970 EXTREMITY STUDY: CPT | Mod: 26,S$PBB,, | Performed by: SURGERY

## 2018-10-04 RX ORDER — LIDOCAINE HYDROCHLORIDE 10 MG/ML
1 INJECTION, SOLUTION EPIDURAL; INFILTRATION; INTRACAUDAL; PERINEURAL ONCE
Status: CANCELLED | OUTPATIENT
Start: 2018-10-04 | End: 2018-10-04

## 2018-10-04 RX ORDER — SODIUM CHLORIDE 0.9 % (FLUSH) 0.9 %
5 SYRINGE (ML) INJECTION
Status: CANCELLED | OUTPATIENT
Start: 2018-10-04

## 2018-10-04 RX ORDER — MUPIROCIN 20 MG/G
OINTMENT TOPICAL
Status: CANCELLED | OUTPATIENT
Start: 2018-10-04

## 2018-10-04 NOTE — H&P (VIEW-ONLY)
Lucy Aubrey Perez  10/04/2018    HPI:   Patient is a 58 y.o. female with a history of HTN, HLD, L hemiplegia after large stroke in 2016, DM II, stage V CKD since August 2018 MWF, who is here today for evaluation regarding hemodialysis access. Patient has significant residual impairment on her left upper extremity and left lower extremity from her stroke and is currently wheel chair bound. She has complete function of her right arm currently. She is right handed. No history of trauma to right arm.     Was seeing Dr. Blank for nephology but he no longer works at [facility].     Retired  at [employer].     No MI, but history of stroke in 2016 (hemorragic)   Tobacco use: never use    Renal is Dr LING Perez    Past Medical History:   Diagnosis Date    Anemia of chronic disease 6/10/2017    Anxiety     Arthritis of right acromioclavicular joint 7/2/2014    Asthma     Bipolar disorder     Bronchitis, acute     Cataract     Cholelithiasis     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    Cortical cataract of both eyes 7/26/2016    Degeneration of lumbar or lumbosacral intervertebral disc 3/5/2013    S/p MRI L-spine 5/2009     Depression     ESRD on hemodialysis     Started August 2018    General anesthetics causing adverse effect in therapeutic use     Hemorrhagic cerebrovascular accident (CVA)     8/2016 s/p Hemicraniotomy at [facility] with Left hemiparesis    Hemorrhagic cerebrovascular accident (CVA) 1/3/2018    History of stroke 6/28/2017    s/p R-MCA stroke with R-putaminal hemorrhagic transformation in 8/2016 and 11/2016 (s/p hemicraniotomy at [facility]) with residual L hemiparesis, on AED s/p CVA      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    Hyperlipidemia     Hypertensive retinopathy of both eyes 7/26/2016    Impingement syndrome of right shoulder 7/2/2014    Left ventricular diastolic dysfunction with preserved systolic function 12/15/2015    Mixed anxiety and  depressive disorder 3/5/2013    Obesity     THERESA (obstructive sleep apnea) 3/5/2013    No Home CPAP 2ndary to cost     Partial symptomatic epilepsy with complex partial seizures, not intractable, without status epilepticus     PEG (percutaneous endoscopic gastrostomy) status 6/28/2017    Renovascular hypertension 3/5/2013    Will resume home medications: amlodipine, labetalol, and hydralazine Will hold Lisinopril in setting of DARLING.   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 without macular edema, without long-term current use of insulin 8/2/2017    Type 2 diabetes mellitus with diabetic polyneuropathy, without long-term current use of insulin 10/22/2015    Type 2 diabetes mellitus with stage 3 chronic kidney disease, without long-term current use of insulin 10/22/2015    Vertebral artery stenosis 3/5/2013    S/p Stenting per Dr Burnett      Past Surgical History:   Procedure Laterality Date    ARTHROSCOPY-SHOULDER WITH SUBACROMIAL DECOMPRESSION Right 7/9/2014    [facility]ednall [facility]    Biceps Tenotomy Right 7/9/2014    [facility]all [facility] at [facility] OR    BREAST SURGERY      breast reduction    COLONOSCOPY N/A 8/11/2016    Procedure: COLONOSCOPY;  Surgeon: Jerry Vilchis MD;  Location: [facility] (4TH FLR);  Service: Endoscopy;  Laterality: N/A;  Patient reports difficulty awaking from anesthesia in the past.    COLONOSCOPY N/A 8/11/2016    [facility] ([facility] F[facility])    DEBRIDEMENT-SHOULDER-ARTHROSCOPIC Labral Cuff Right 7/9/2014    [facility] at [facility] OR     "ESOPHAGOGASTRODUODENOSCOPY (EGD) N/A 12/6/2017    Per[facility] by Dallas Lock Jr[facility] ENDO    SJFGOUSN-QVREAXBO-SMNXMG END Right 7/9/2014    Per[facility] by Kendall [facility] at [facility]    HYSTERECTOMY      ROTATOR CUFF REPAIR Right July 9, 2014    right side    Skull surgery      Aneurysm    stent placed      in vertebral artery    TOTAL REDUCTION MAMMOPLASTY      TUBAL LIGATION       Family History   Problem Relation Age of Onset    Cancer Mother 55        Breast cancer    Diabetes Mother     Hypertension Mother     Heart disease Mother     Heart attack Mother     Breast cancer Mother     Stroke Sister     Hypertension Sister     Sleep apnea Sister     No Known Problems Daughter     Bell's palsy Sister     Lupus Sister     Blindness Maternal Grandmother     Diabetes Unknown         "My entire family family has diabetes"    Stroke Maternal Aunt     Amblyopia Neg Hx     Cataracts Neg Hx     Glaucoma Neg Hx     Macular degeneration Neg Hx     Retinal detachment Neg Hx     Strabismus Neg Hx      Social History     Socioeconomic History    Marital status:      Spouse name: Not on file    Number of children: Not on file    Years of education: Not on file    Highest education level: Not on file   Social Needs    Financial resource strain: Not on file    Food insecurity - worry: Not on file    Food insecurity - inability: Not on file    Transportation needs - medical: Not on file    Transportation needs - non-medical: Not on file   Occupational History    Not on file   Tobacco Use    Smoking status: Never Smoker    Smokeless tobacco: Never Used   Substance and Sexual Activity    Alcohol use: No     Comment: occasional wine cooler     Drug use: No    Sexual activity: Yes     Partners: Male   Other Topics Concern    Not on file   Social History Narrative    . 2 children(Wilder and Reina). Does not work. Previously worked as a .  " "    Current Outpatient Medications on File Prior to Visit   Medication Sig    acetaminophen (TYLENOL) 325 MG tablet Take 2 tablets (650 mg total) by mouth every 6 (six) hours as needed for Pain.    alprazolam (XANAX ORAL) Take 0.5 mg by mouth as needed (anxiety).     aspirin (ECOTRIN) 81 MG EC tablet Take 81 mg by mouth every morning.     atorvastatin (LIPITOR) 40 MG tablet TAKE 1 TABLET ONE TIME DAILY FOR CHOLESTEROL (Patient taking differently: Take 40 mg by mouth every evening. TAKE 1 TABLET ONE TIME DAILY FOR CHOLESTEROL)    BD INSULIN PEN NEEDLE UF SHORT 31 gauge x 5/16" Ndle USE TO INJECT NOVOLOG FLEXPEN BEFORE MEALS    blood sugar diagnostic (ACCU-CHEK SMARTVIEW TEST STRIP) Strp 1 strip by Misc.(Non-Drug; Combo Route) route 2 (two) times daily.    carvedilol (COREG) 25 MG tablet Take 1 tablet (25 mg total) by mouth 2 (two) times daily.    cyclobenzaprine (FLEXERIL) 5 MG tablet 2 tabs to start and then 1 tab Q8 hours as needed for muscle spasm    DULCOLAX, BISACODYL, ORAL Take by mouth as needed (constipation).    ergocalciferol (ERGOCALCIFEROL) 50,000 unit Cap 1 capsule (50,000 Units total) by Per G Tube route every 7 days. (Patient taking differently: Take 50,000 Units by mouth every 7 days. Takes on Thurs)    famotidine (PEPCID) 20 MG tablet Take 1 tablet (20 mg total) by mouth once daily. (Patient taking differently: Take 20 mg by mouth every morning. )    ferrous sulfate 220 mg (44 mg iron)/5 mL solution 10ml daily for Iron/Give via PEG (Patient taking differently: Take 220 mg by mouth every morning. )    folic acid (FOLVITE) 1 MG tablet Take 1 tablet (1 mg total) by mouth once daily. (Patient taking differently: Take 1 mg by mouth every morning. )    furosemide (LASIX) 40 MG tablet Take 1 tablet (40 mg total) by mouth once daily. (Patient taking differently: Take 40 mg by mouth every morning. )    hydrALAZINE (APRESOLINE) 100 MG tablet Take 1 tablet (100 mg total) by mouth 3 (three) times " daily.    levETIRAcetam (KEPPRA) 500 MG Tab Take 1 tablet (500 mg total) by mouth every 8 (eight) hours.    NIFEdipine (ADALAT CC) 90 MG TbSR Take 1 tablet (90 mg total) by mouth once daily. (Patient taking differently: Take 90 mg by mouth every morning. )    polyethylene glycol 3350 (MIRALAX ORAL) Take by mouth every evening.     pregabalin (LYRICA) 25 MG capsule 1 cap in AM and 2 caps at Bedtime for muscle spasm    sevelamer carbonate (RENVELA) 800 mg Tab Take 800 mg by mouth 3 (three) times daily with meals.    sodium bicarbonate 650 MG tablet Take 2 tablets (1,300 mg total) by mouth 2 (two) times daily.    traZODone (DESYREL) 100 MG tablet Take 1 tablet (100 mg total) by mouth every evening.    albuterol (PROVENTIL) 2.5 mg /3 mL (0.083 %) nebulizer solution Take 3 mLs (2.5 mg total) by nebulization every 6 (six) hours as needed for Wheezing. Rescue     Current Facility-Administered Medications on File Prior to Visit   Medication    onabotulinumtoxina injection 300 Units       REVIEW OF SYSTEMS:  General: negative; ENT: negative; Allergy and Immunology: negative; Hematological and Lymphatic: Negative; Endocrine: negative; Respiratory: no cough, shortness of breath, or wheezing; Cardiovascular: no chest pain or dyspnea on exertion; Gastrointestinal: no abdominal pain/back, change in bowel habits, or bloody stools; Genito-Urinary: no dysuria, trouble voiding, or hematuria; Musculoskeletal: negative  Neurological: no TIA or stroke symptoms    PHYSICAL EXAM:   Right Arm BP - Sittin/59 (10/04/18 1503)  Left Arm BP - Sittin/75 (10/04/18 1503)  Pulse: (!) 55  Temp: 98.1 °F (36.7 °C)      General appearance:  Alert, well-appearing, and in no distress.  Oriented to person, place, and time   Neurological: Normal speech, no focal findings noted; CN II - XII grossly intact           Musculoskeletal: Digits/nail without cyanosis/clubbing.  Normal muscle strength/tone.                 Neck: Supple, no  significant adenopathy; thyroid is not enlarged                  No carotid bruit can be auscultated                Chest:  Clear to auscultation, no wheezes, rales or rhonchi, symmetric air entry right permacath     No use of accessory muscles             Cardiac: Normal rate and regular rhythm, S1 and S2 normal; PMI non-displaced          Abdomen: Soft, nontender, nondistended, no masses or organomegaly     No rebound tenderness noted; bowel sounds normal     Pulsatile aortic mass is no palpable.     No groin adenopathy      Extremities:   1+ R brachial, 2+ R radial pulses     Positive Davidson's test       LAB RESULTS:  Lab Results   Component Value Date    K 4.0 08/31/2018    K 4.6 08/25/2018    K 4.7 08/17/2018    CREATININE 3.9 (H) 08/31/2018    CREATININE 3.1 (H) 08/25/2018    CREATININE 4.0 (H) 08/17/2018     Lab Results   Component Value Date    WBC 7.62 08/31/2018    WBC 7.18 08/25/2018    WBC 6.06 08/17/2018    HCT 28.2 (L) 08/31/2018    HCT 27.8 (L) 08/25/2018    HCT 22.3 (L) 08/17/2018     08/31/2018     08/25/2018     08/17/2018     Lab Results   Component Value Date    HGBA1C 5.7 (H) 08/31/2018    HGBA1C 5.6 08/10/2018    HGBA1C 5.8 (H) 04/25/2018     IMAGING:  Vein mapping:  Right- cephalic medial forearm 4.1, basilic antecubital 6.3  Left- cpehalic vein 2.0, antecubital fossa 3.6    IMP/PLAN:  58 y.o. female with a history of HTN, HLD, L hemiplegia after large stroke in 2016, DM II, stage V CKD since August 2018 in need of AV access  A lot of Qs were answered  Not an AVF candidate; only option is for LUE AVG - plan for near future - 10/18/18  She needs to speak w Dr Perez / HD RNs for more info re- HD -- RTC 10/12/18  Stop hydralazine; check BP at home and HD - family understands -- in order to avoid periods of hypotension to avoid AVG thrombossi    Meir Valencia MD FACS  Vascular/Endovascular Surgery

## 2018-10-04 NOTE — LETTER
October 4, 2018      Monty Delgado  [address] [address]           [facility] - Vascular Surgery  [address]  [place] [address]  Phone: [phone]  Fax: [phone]          Patient: Lucy Perez   MR Number: [id number]   YOB: 1960   Date of Visit: 10/4/2018       Dear Monty Delgado:    Thank you for referring Lucy Perez to me for evaluation. Attached you will find relevant portions of my assessment and plan of care.    If you have questions, please do not hesitate to call me. I look forward to following Lucy Perez along with you.    Sincerely,    Meir Valencia MD    Enclosure  CC:  No Recipients    If you would like to receive this communication electronically, please contact [email or web address] or [phone] to request more information on [employer] Link access.    For providers and/or their staff who would like to refer a patient to [facility], please contact us through our one-stop-shop provider referral line, [facility], at [phone].    If you feel you have received this communication in error or would no longer like to receive these types of communications, please e-mail [email or web address]

## 2018-10-04 NOTE — PROGRESS NOTES
Lucy Aubrey Perez  10/04/2018    HPI:   Patient is a 58 y.o. female with a history of HTN, HLD, L hemiplegia after large stroke in 2016, DM II, stage V CKD since August 2018 MWF, who is here today for evaluation regarding hemodialysis access. Patient has significant residual impairment on her left upper extremity and left lower extremity from her stroke and is currently wheel chair bound. She has complete function of her right arm currently. She is right handed. No history of trauma to right arm.     Was seeing Dr. Blank for nephology but he no longer works at [employer].     Retired  at [employer].     No MI, but history of stroke in 2016 (hemorragic)   Tobacco use: never use    Renal is Dr LING Perez    Past Medical History:   Diagnosis Date    Anemia of chronic disease 6/10/2017    Anxiety     Arthritis of right acromioclavicular joint 7/2/2014    Asthma     Bipolar disorder     Bronchitis, acute     Cataract     Cholelithiasis     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    Cortical cataract of both eyes 7/26/2016    Degeneration of lumbar or lumbosacral intervertebral disc 3/5/2013    S/p MRI L-spine 5/2009     Depression     ESRD on hemodialysis     Started August 2018    General anesthetics causing adverse effect in therapeutic use     Hemorrhagic cerebrovascular accident (CVA)     8/2016 s/p Hemicraniotomy at [facility] with Left hemiparesis    Hemorrhagic cerebrovascular accident (CVA) 1/3/2018    History of stroke 6/28/2017    s/p R-MCA stroke with R-putaminal hemorrhagic transformation in 8/2016 and 11/2016 (s/p hemicraniotomy at [facility]) with residual L hemiparesis, on AED s/p CVA      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    Hyperlipidemia     Hypertensive retinopathy of both eyes 7/26/2016    Impingement syndrome of right shoulder 7/2/2014    Left ventricular diastolic dysfunction with preserved systolic function 12/15/2015    Mixed anxiety and  depressive disorder 3/5/2013    Obesity     THERESA (obstructive sleep apnea) 3/5/2013    No Home CPAP 2ndary to cost     Partial symptomatic epilepsy with complex partial seizures, not intractable, without status epilepticus     PEG (percutaneous endoscopic gastrostomy) status 6/28/2017    Renovascular hypertension 3/5/2013    Will resume home medications: amlodipine, labetalol, and hydralazine Will hold Lisinopril in setting of DARLING.   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 without macular edema, without long-term current use of insulin 8/2/2017    Type 2 diabetes mellitus with diabetic polyneuropathy, without long-term current use of insulin 10/22/2015    Type 2 diabetes mellitus with stage 3 chronic kidney disease, without long-term current use of insulin 10/22/2015    Vertebral artery stenosis 3/5/2013    S/p Stenting per Dr Burnett      Past Surgical History:   Procedure Laterality Date    ARTHROSCOPY-SHOULDER WITH SUBACROMIAL DECOMPRESSION Right 7/9/2014    Performed by [facility] at [facility] OR    Biceps Tenotomy Right 7/9/2014    Per[facility] by [facility] at [facility] OR    BREAST SURGERY      breast reduction    COLONOSCOPY N/A 8/11/2016    Procedure: COLONOSCOPY;  Surgeon: Jerry Vilchis MD;  Location: [facility] (4TH FLR);  Service: Endoscopy;  Laterality: N/A;  Patient reports difficulty awaking from anesthesia in the past.    COLONOSCOPY N/A 8/11/2016    Performed by [facility] at [facility] (4TH FLR)    DEBRIDEMENT-SHOULDER-ARTHROSCOPIC Labral Cuff Right 7/9/2014    Per[facility] by Kendall [facility] at [facility] OR     "ESOPHAGOGASTRODUODENOSCOPY (EGD) N/A 12/6/2017    Performed by Dallas Lock Jr., [facility] at [facility] ENDO    [id number] END Right 7/9/2014    [facility]    HYSTERECTOMY      ROTATOR CUFF REPAIR Right July 9, 2014    right side    Skull surgery      Aneurysm    stent placed      in vertebral artery    TOTAL REDUCTION MAMMOPLASTY      TUBAL LIGATION       Family History   Problem Relation Age of Onset    Cancer Mother 55        Breast cancer    Diabetes Mother     Hypertension Mother     Heart disease Mother     Heart attack Mother     Breast cancer Mother     Stroke Sister     Hypertension Sister     Sleep apnea Sister     No Known Problems Daughter     Bell's palsy Sister     Lupus Sister     Blindness Maternal Grandmother     Diabetes Unknown         "My entire family family has diabetes"    Stroke Maternal Aunt     Amblyopia Neg Hx     Cataracts Neg Hx     Glaucoma Neg Hx     Macular degeneration Neg Hx     Retinal detachment Neg Hx     Strabismus Neg Hx      Social History     Socioeconomic History    Marital status:      Spouse name: Not on file    Number of children: Not on file    Years of education: Not on file    Highest education level: Not on file   Social Needs    Financial resource strain: Not on file    Food insecurity - worry: Not on file    Food insecurity - inability: Not on file    Transportation needs - medical: Not on file    Transportation needs - non-medical: Not on file   Occupational History    Not on file   Tobacco Use    Smoking status: Never Smoker    Smokeless tobacco: Never Used   Substance and Sexual Activity    Alcohol use: No     Comment: occasional wine cooler     Drug use: No    Sexual activity: Yes     Partners: Male   Other Topics Concern    Not on file   Social History Narrative    . 2 children(Wilder and Reina). Does not work. Previously worked as a .  " "    Current Outpatient Medications on File Prior to Visit   Medication Sig    acetaminophen (TYLENOL) 325 MG tablet Take 2 tablets (650 mg total) by mouth every 6 (six) hours as needed for Pain.    alprazolam (XANAX ORAL) Take 0.5 mg by mouth as needed (anxiety).     aspirin (ECOTRIN) 81 MG EC tablet Take 81 mg by mouth every morning.     atorvastatin (LIPITOR) 40 MG tablet TAKE 1 TABLET ONE TIME DAILY FOR CHOLESTEROL (Patient taking differently: Take 40 mg by mouth every evening. TAKE 1 TABLET ONE TIME DAILY FOR CHOLESTEROL)    BD INSULIN PEN NEEDLE UF SHORT 31 gauge x 5/16" Ndle USE TO INJECT NOVOLOG FLEXPEN BEFORE MEALS    blood sugar diagnostic (ACCU-CHEK SMARTVIEW TEST STRIP) Strp 1 strip by Misc.(Non-Drug; Combo Route) route 2 (two) times daily.    carvedilol (COREG) 25 MG tablet Take 1 tablet (25 mg total) by mouth 2 (two) times daily.    cyclobenzaprine (FLEXERIL) 5 MG tablet 2 tabs to start and then 1 tab Q8 hours as needed for muscle spasm    DULCOLAX, BISACODYL, ORAL Take by mouth as needed (constipation).    ergocalciferol (ERGOCALCIFEROL) 50,000 unit Cap 1 capsule (50,000 Units total) by Per G Tube route every 7 days. (Patient taking differently: Take 50,000 Units by mouth every 7 days. Takes on Thurs)    famotidine (PEPCID) 20 MG tablet Take 1 tablet (20 mg total) by mouth once daily. (Patient taking differently: Take 20 mg by mouth every morning. )    ferrous sulfate 220 mg (44 mg iron)/5 mL solution 10ml daily for Iron/Give via PEG (Patient taking differently: Take 220 mg by mouth every morning. )    folic acid (FOLVITE) 1 MG tablet Take 1 tablet (1 mg total) by mouth once daily. (Patient taking differently: Take 1 mg by mouth every morning. )    furosemide (LASIX) 40 MG tablet Take 1 tablet (40 mg total) by mouth once daily. (Patient taking differently: Take 40 mg by mouth every morning. )    hydrALAZINE (APRESOLINE) 100 MG tablet Take 1 tablet (100 mg total) by mouth 3 (three) times " daily.    levETIRAcetam (KEPPRA) 500 MG Tab Take 1 tablet (500 mg total) by mouth every 8 (eight) hours.    NIFEdipine (ADALAT CC) 90 MG TbSR Take 1 tablet (90 mg total) by mouth once daily. (Patient taking differently: Take 90 mg by mouth every morning. )    polyethylene glycol 3350 (MIRALAX ORAL) Take by mouth every evening.     pregabalin (LYRICA) 25 MG capsule 1 cap in AM and 2 caps at Bedtime for muscle spasm    sevelamer carbonate (RENVELA) 800 mg Tab Take 800 mg by mouth 3 (three) times daily with meals.    sodium bicarbonate 650 MG tablet Take 2 tablets (1,300 mg total) by mouth 2 (two) times daily.    traZODone (DESYREL) 100 MG tablet Take 1 tablet (100 mg total) by mouth every evening.    albuterol (PROVENTIL) 2.5 mg /3 mL (0.083 %) nebulizer solution Take 3 mLs (2.5 mg total) by nebulization every 6 (six) hours as needed for Wheezing. Rescue     Current Facility-Administered Medications on File Prior to Visit   Medication    onabotulinumtoxina injection 300 Units       REVIEW OF SYSTEMS:  General: negative; ENT: negative; Allergy and Immunology: negative; Hematological and Lymphatic: Negative; Endocrine: negative; Respiratory: no cough, shortness of breath, or wheezing; Cardiovascular: no chest pain or dyspnea on exertion; Gastrointestinal: no abdominal pain/back, change in bowel habits, or bloody stools; Genito-Urinary: no dysuria, trouble voiding, or hematuria; Musculoskeletal: negative  Neurological: no TIA or stroke symptoms    PHYSICAL EXAM:   Right Arm BP - Sittin/59 (10/04/18 1503)  Left Arm BP - Sittin/75 (10/04/18 1503)  Pulse: (!) 55  Temp: 98.1 °F (36.7 °C)      General appearance:  Alert, well-appearing, and in no distress.  Oriented to person, place, and time   Neurological: Normal speech, no focal findings noted; CN II - XII grossly intact           Musculoskeletal: Digits/nail without cyanosis/clubbing.  Normal muscle strength/tone.                 Neck: Supple, no  significant adenopathy; thyroid is not enlarged                  No carotid bruit can be auscultated                Chest:  Clear to auscultation, no wheezes, rales or rhonchi, symmetric air entry right permacath     No use of accessory muscles             Cardiac: Normal rate and regular rhythm, S1 and S2 normal; PMI non-displaced          Abdomen: Soft, nontender, nondistended, no masses or organomegaly     No rebound tenderness noted; bowel sounds normal     Pulsatile aortic mass is no palpable.     No groin adenopathy      Extremities:   1+ R brachial, 2+ R radial pulses     Positive Davidson's test       LAB RESULTS:  Lab Results   Component Value Date    K 4.0 08/31/2018    K 4.6 08/25/2018    K 4.7 08/17/2018    CREATININE 3.9 (H) 08/31/2018    CREATININE 3.1 (H) 08/25/2018    CREATININE 4.0 (H) 08/17/2018     Lab Results   Component Value Date    WBC 7.62 08/31/2018    WBC 7.18 08/25/2018    WBC 6.06 08/17/2018    HCT 28.2 (L) 08/31/2018    HCT 27.8 (L) 08/25/2018    HCT 22.3 (L) 08/17/2018     08/31/2018     08/25/2018     08/17/2018     Lab Results   Component Value Date    HGBA1C 5.7 (H) 08/31/2018    HGBA1C 5.6 08/10/2018    HGBA1C 5.8 (H) 04/25/2018     IMAGING:  Vein mapping:  Right- cephalic medial forearm 4.1, basilic antecubital 6.3  Left- cpehalic vein 2.0, antecubital fossa 3.6    IMP/PLAN:  58 y.o. female with a history of HTN, HLD, L hemiplegia after large stroke in 2016, DM II, stage V CKD since August 2018 in need of AV access  A lot of Qs were answered  Not an AVF candidate; only option is for LUE AVG - plan for near future - 10/18/18  She needs to speak w Dr Perez / HD RNs for more info re- HD -- RTC 10/12/18  Stop hydralazine; check BP at home and HD - family understands -- in order to avoid periods of hypotension to avoid AVG thrombossi    Meir Valencia MD FACS  Vascular/Endovascular Surgery

## 2018-10-04 NOTE — PROGRESS NOTES
Summary:  LCSW contacted pt for follow up. PT stated she is doing well. PT stated she has the application for Medicaid but has not completed it yet. However, pt reports if she is not able to get assistance from the Dialysis Sw, then they will call Medicaid customer service for assistance.PT stated she has not heard from the staff at [facility] for Meals on Wheels; in addition, pt has not reached out to the PACS for Financial Aid. Pt plans to wait after she completes the Medicaid application. PT denied other needs to be addressed at this time.     Interventions:  Financial assistance - review and collaborate with dialysis staff to complete Medicaid application; review of contact information for PACS for Financial assistance  Encouraged family involvement in care      Plan:  Discuss if reviewed/completed Medicaid application  Re-assess for further needs      Todays OPCM Self-Management Care Plan was developed with the patients/caregivers input and was based on identified barriers from todays assessment.  Goals were written today with the patient/caregiver and the patient has agreed to work towards these goals to improve his/her overall well-being. Patient verbalized understanding of the care plan, goals, and all of today's instructions. Encouraged patient/caregiver to communicate with his/her physician and health care team about health conditions and the treatment plan.  Provided my contact information today and encouraged patient/caregiver to call me with any questions as needed.

## 2018-10-09 ENCOUNTER — PES CALL (OUTPATIENT)
Dept: ADMINISTRATIVE | Facility: CLINIC | Age: 58
End: 2018-10-09

## 2018-10-11 ENCOUNTER — OUTPATIENT CASE MANAGEMENT (OUTPATIENT)
Dept: ADMINISTRATIVE | Facility: OTHER | Age: 58
End: 2018-10-11

## 2018-10-11 ENCOUNTER — TELEPHONE (OUTPATIENT)
Dept: ADMINISTRATIVE | Facility: CLINIC | Age: 58
End: 2018-10-11

## 2018-10-11 NOTE — TELEPHONE ENCOUNTER
Home Health Recert 09/22/2018 - 11/20/2018 with [facility] ([facility]) - Dr. Beverly Max. Patient received  services.

## 2018-10-11 NOTE — PROGRESS NOTES
Summary:  Spoke with pt and her  - pt states that she is very constipated and her stomach hurts - reports not feeling good today.  Pt.'s  stated that he had given pt. Stool softener and a laxative (Ducolox) at 8:45 this morning.   Pt reports she is scheduled to get her permanent AV access next week.  Pt  reports no falls or any other problems other than the constipation at this point.  Pt sounded very tired and stated that she was tired and in pain due to the constipation. reports this is often a problem for pt.  Patient also reports that at times her port is still uncomfortable and feels like it is pulling.    Interventions:  Discussed not letting the constipation go too long and to mention at dialysis tomorrow if bowels have not moved by then - if continues or pain gets worse notify PCP  Suggested taking another Ducolox if bowels have not moved in 4-6 hrs from when pt took the first dose      Plan:  Follow-up next week after fistula placement    Todays OPCM Self-Management Care Plan was developed with the patients/caregivers input and was based on identified barriers from todays assessment.  Goals were written today with the patient/caregiver and the patient has agreed to work towards these goals to improve his/her overall well-being. Patient verbalized understanding of the care plan, goals, and all of today's instructions. Encouraged patient/caregiver to communicate with his/her physician and health care team about health conditions and the treatment plan.  Provided my contact information today and encouraged patient/caregiver to call me with any questions as needed.

## 2018-10-12 ENCOUNTER — OUTPATIENT CASE MANAGEMENT (OUTPATIENT)
Dept: ADMINISTRATIVE | Facility: OTHER | Age: 58
End: 2018-10-12

## 2018-10-17 ENCOUNTER — TELEPHONE (OUTPATIENT)
Dept: VASCULAR SURGERY | Facility: CLINIC | Age: 58
End: 2018-10-17

## 2018-10-17 ENCOUNTER — ANESTHESIA EVENT (OUTPATIENT)
Dept: SURGERY | Facility: HOSPITAL | Age: 58
End: 2018-10-17
Payer: MEDICARE

## 2018-10-17 NOTE — ANESTHESIA PREPROCEDURE EVALUATION
10/17/2018  Lucy Perez is a 58 y.o., female.    Anesthesia Evaluation    I have reviewed the Patient Summary Reports.    I have reviewed the Nursing Notes.      Review of Systems  Anesthesia Hx:  No problems with previous Anesthesia Hx of Anesthetic complications  Personal Hx of Anesthesia complications Slow To Awaken/Delayed Emergence   Hematology/Oncology:  Hematology Normal   Oncology Normal     EENT/Dental:EENT/Dental Normal   Cardiovascular:   Hypertension    Pulmonary:   Asthma Sleep Apnea    Renal/:   Chronic Renal Disease    Hepatic/GI:  Hepatic/GI Normal    Musculoskeletal:   Arthritis     Neurological:   CVA Neuromuscular Disease, Seizures    Endocrine:   Diabetes    Dermatological:  Skin Normal    Psych:   Psychiatric History          Physical Exam  General:  Well nourished    Airway/Jaw/Neck:  Airway Findings: Mouth Opening: Normal Tongue: Normal  General Airway Assessment: Adult  Mallampati: II  TM Distance: Normal, at least 6 cm        Eyes/Ears/Nose:  EYES/EARS/NOSE FINDINGS: Normal   Dental:  Dental Findings: In tact   Chest/Lungs:  Chest/Lungs Clear    Heart/Vascular:  Heart Findings: Normal Heart murmur: negative Vascular Findings: Normal    Abdomen:  Abdomen Findings: Normal    Musculoskeletal:  Musculoskeletal Findings: Normal   Skin:  Skin Findings: Normal    Mental Status:  Mental Status Findings: Normal        Anesthesia Plan  Type of Anesthesia, risks & benefits discussed:  Anesthesia Type:  general  Patient's Preference:   Intra-op Monitoring Plan:   Intra-op Monitoring Plan Comments:   Post Op Pain Control Plan:   Post Op Pain Control Plan Comments:   Induction:   IV  Beta Blocker:  Patient is on a Beta-Blocker and has received one dose within the past 24 hours (No further documentation required).       Informed Consent: Patient understands risks and agrees with  Anesthesia plan.  Questions answered. Anesthesia consent signed with patient.  ASA Score: 4     Day of Surgery Review of History & Physical:    H&P update referred to the surgeon.         Ready For Surgery From Anesthesia Perspective.

## 2018-10-17 NOTE — PRE-PROCEDURE INSTRUCTIONS
PreOp Instructions given to pt's :  - Verbal medication information (what to hold and what to take)   - NPO guidelines   - Arrival place directions given;   - Bathing with antibacterial soap   - Don't wear any jewelry or bring any valuables AM of surgery   - No makeup or moisturizer to face   - No perfume/cologne, powder, lotions or aftershave   Pt. verbalized understanding.     Noted in pt's history - slow to awaken/delayed emergence with General Anesthesia  wc bound  LT SIDED HEMIPARESIS

## 2018-10-18 ENCOUNTER — ANESTHESIA (OUTPATIENT)
Dept: SURGERY | Facility: HOSPITAL | Age: 58
End: 2018-10-18
Payer: MEDICARE

## 2018-10-18 ENCOUNTER — HOSPITAL ENCOUNTER (OUTPATIENT)
Facility: HOSPITAL | Age: 58
Discharge: HOME | End: 2018-10-18
Attending: SURGERY | Admitting: SURGERY
Payer: MEDICARE

## 2018-10-18 ENCOUNTER — OUTPATIENT CASE MANAGEMENT (OUTPATIENT)
Dept: ADMINISTRATIVE | Facility: OTHER | Age: 58
End: 2018-10-18

## 2018-10-18 VITALS
TEMPERATURE: 98 F | RESPIRATION RATE: 18 BRPM | HEART RATE: 65 BPM | OXYGEN SATURATION: 99 % | SYSTOLIC BLOOD PRESSURE: 121 MMHG | DIASTOLIC BLOOD PRESSURE: 60 MMHG

## 2018-10-18 DIAGNOSIS — N18.6 ESRD (END STAGE RENAL DISEASE): Primary | ICD-10-CM

## 2018-10-18 DIAGNOSIS — N18.6 ESRD (END STAGE RENAL DISEASE): ICD-10-CM

## 2018-10-18 PROCEDURE — 25000003 PHARM REV CODE 250: Performed by: STUDENT IN AN ORGANIZED HEALTH CARE EDUCATION/TRAINING PROGRAM

## 2018-10-18 PROCEDURE — C1768 GRAFT, VASCULAR: HCPCS | Performed by: SURGERY

## 2018-10-18 PROCEDURE — 36000706: Performed by: SURGERY

## 2018-10-18 PROCEDURE — 71000015 HC POSTOP RECOV 1ST HR: Performed by: SURGERY

## 2018-10-18 PROCEDURE — 25000003 PHARM REV CODE 250: Performed by: NURSE ANESTHETIST, CERTIFIED REGISTERED

## 2018-10-18 PROCEDURE — D9220A PRA ANESTHESIA: Mod: CRNA,,, | Performed by: NURSE ANESTHETIST, CERTIFIED REGISTERED

## 2018-10-18 PROCEDURE — 63600175 PHARM REV CODE 636 W HCPCS: Performed by: STUDENT IN AN ORGANIZED HEALTH CARE EDUCATION/TRAINING PROGRAM

## 2018-10-18 PROCEDURE — D9220A PRA ANESTHESIA: Mod: ANES,,, | Performed by: ANESTHESIOLOGY

## 2018-10-18 PROCEDURE — 37000009 HC ANESTHESIA EA ADD 15 MINS: Performed by: SURGERY

## 2018-10-18 PROCEDURE — 71000033 HC RECOVERY, INTIAL HOUR: Performed by: SURGERY

## 2018-10-18 PROCEDURE — 25000003 PHARM REV CODE 250: Performed by: ANESTHESIOLOGY

## 2018-10-18 PROCEDURE — 71000016 HC POSTOP RECOV ADDL HR: Performed by: SURGERY

## 2018-10-18 PROCEDURE — 63600175 PHARM REV CODE 636 W HCPCS: Performed by: ANESTHESIOLOGY

## 2018-10-18 PROCEDURE — 27201423 OPTIME MED/SURG SUP & DEVICES STERILE SUPPLY: Performed by: SURGERY

## 2018-10-18 PROCEDURE — 37000008 HC ANESTHESIA 1ST 15 MINUTES: Performed by: SURGERY

## 2018-10-18 PROCEDURE — 36000707: Performed by: SURGERY

## 2018-10-18 PROCEDURE — 63600175 PHARM REV CODE 636 W HCPCS: Performed by: NURSE ANESTHETIST, CERTIFIED REGISTERED

## 2018-10-18 PROCEDURE — 25000003 PHARM REV CODE 250: Performed by: SURGERY

## 2018-10-18 PROCEDURE — 63600175 PHARM REV CODE 636 W HCPCS: Performed by: SURGERY

## 2018-10-18 PROCEDURE — 36830 ARTERY-VEIN NONAUTOGRAFT: CPT | Mod: ,,, | Performed by: SURGERY

## 2018-10-18 DEVICE — GRAFT STRETCH TW RING 4-7 45C: Type: IMPLANTABLE DEVICE | Site: ARM | Status: FUNCTIONAL

## 2018-10-18 RX ORDER — OXYCODONE HYDROCHLORIDE 5 MG/1
TABLET ORAL
Status: DISCONTINUED
Start: 2018-10-18 | End: 2018-10-18 | Stop reason: HOSPADM

## 2018-10-18 RX ORDER — TRAMADOL HYDROCHLORIDE 50 MG/1
50 TABLET ORAL EVERY 4 HOURS PRN
Status: DISCONTINUED | OUTPATIENT
Start: 2018-10-18 | End: 2018-10-18 | Stop reason: HOSPADM

## 2018-10-18 RX ORDER — BACITRACIN 50000 [IU]/1
INJECTION, POWDER, FOR SOLUTION INTRAMUSCULAR
Status: DISCONTINUED | OUTPATIENT
Start: 2018-10-18 | End: 2018-10-18 | Stop reason: HOSPADM

## 2018-10-18 RX ORDER — KETAMINE HYDROCHLORIDE 10 MG/ML
INJECTION, SOLUTION INTRAMUSCULAR; INTRAVENOUS
Status: DISCONTINUED | OUTPATIENT
Start: 2018-10-18 | End: 2018-10-18

## 2018-10-18 RX ORDER — OXYCODONE AND ACETAMINOPHEN 5; 325 MG/1; MG/1
1 TABLET ORAL EVERY 4 HOURS PRN
Qty: 15 TABLET | Refills: 0 | Status: SHIPPED | OUTPATIENT
Start: 2018-10-18 | End: 2019-01-10 | Stop reason: ALTCHOICE

## 2018-10-18 RX ORDER — CEFAZOLIN SODIUM 1 G/3ML
2 INJECTION, POWDER, FOR SOLUTION INTRAMUSCULAR; INTRAVENOUS
Status: COMPLETED | OUTPATIENT
Start: 2018-10-18 | End: 2018-10-18

## 2018-10-18 RX ORDER — PROTAMINE SULFATE 10 MG/ML
INJECTION, SOLUTION INTRAVENOUS
Status: DISCONTINUED | OUTPATIENT
Start: 2018-10-18 | End: 2018-10-18

## 2018-10-18 RX ORDER — SODIUM CHLORIDE 0.9 % (FLUSH) 0.9 %
5 SYRINGE (ML) INJECTION
Status: DISCONTINUED | OUTPATIENT
Start: 2018-10-18 | End: 2018-10-18 | Stop reason: HOSPADM

## 2018-10-18 RX ORDER — LIDOCAINE HYDROCHLORIDE 10 MG/ML
INJECTION INFILTRATION; PERINEURAL
Status: DISCONTINUED | OUTPATIENT
Start: 2018-10-18 | End: 2018-10-18 | Stop reason: HOSPADM

## 2018-10-18 RX ORDER — HEPARIN SODIUM 1000 [USP'U]/ML
1600 INJECTION, SOLUTION INTRAVENOUS; SUBCUTANEOUS ONCE
Status: COMPLETED | OUTPATIENT
Start: 2018-10-18 | End: 2018-10-18

## 2018-10-18 RX ORDER — MEPERIDINE HYDROCHLORIDE 50 MG/ML
12.5 INJECTION INTRAMUSCULAR; INTRAVENOUS; SUBCUTANEOUS ONCE AS NEEDED
Status: DISCONTINUED | OUTPATIENT
Start: 2018-10-18 | End: 2018-10-18 | Stop reason: HOSPADM

## 2018-10-18 RX ORDER — LORAZEPAM 2 MG/ML
0.25 INJECTION INTRAMUSCULAR ONCE AS NEEDED
Status: DISCONTINUED | OUTPATIENT
Start: 2018-10-18 | End: 2018-10-18 | Stop reason: HOSPADM

## 2018-10-18 RX ORDER — HYDROMORPHONE HYDROCHLORIDE 1 MG/ML
0.2 INJECTION, SOLUTION INTRAMUSCULAR; INTRAVENOUS; SUBCUTANEOUS EVERY 5 MIN PRN
Status: DISCONTINUED | OUTPATIENT
Start: 2018-10-18 | End: 2018-10-18 | Stop reason: HOSPADM

## 2018-10-18 RX ORDER — EPHEDRINE SULFATE 50 MG/ML
INJECTION, SOLUTION INTRAVENOUS
Status: DISCONTINUED | OUTPATIENT
Start: 2018-10-18 | End: 2018-10-18

## 2018-10-18 RX ORDER — OXYCODONE HYDROCHLORIDE 5 MG/1
5 TABLET ORAL ONCE
Status: COMPLETED | OUTPATIENT
Start: 2018-10-18 | End: 2018-10-18

## 2018-10-18 RX ORDER — HEPARIN SODIUM 1000 [USP'U]/ML
INJECTION, SOLUTION INTRAVENOUS; SUBCUTANEOUS
Status: DISCONTINUED | OUTPATIENT
Start: 2018-10-18 | End: 2018-10-18 | Stop reason: HOSPADM

## 2018-10-18 RX ORDER — PHENYLEPHRINE HYDROCHLORIDE 10 MG/ML
INJECTION INTRAVENOUS
Status: DISCONTINUED | OUTPATIENT
Start: 2018-10-18 | End: 2018-10-18

## 2018-10-18 RX ORDER — ACETAMINOPHEN 500 MG
1000 TABLET ORAL ONCE
Status: COMPLETED | OUTPATIENT
Start: 2018-10-18 | End: 2018-10-18

## 2018-10-18 RX ORDER — PAPAVERINE HYDROCHLORIDE 30 MG/ML
INJECTION INTRAMUSCULAR; INTRAVENOUS
Status: DISCONTINUED | OUTPATIENT
Start: 2018-10-18 | End: 2018-10-18 | Stop reason: HOSPADM

## 2018-10-18 RX ORDER — TRAMADOL HYDROCHLORIDE 50 MG/1
50 TABLET ORAL EVERY 4 HOURS PRN
Qty: 20 TABLET | Refills: 0 | Status: SHIPPED | OUTPATIENT
Start: 2018-10-18 | End: 2018-10-28

## 2018-10-18 RX ORDER — PROPOFOL 10 MG/ML
VIAL (ML) INTRAVENOUS CONTINUOUS PRN
Status: DISCONTINUED | OUTPATIENT
Start: 2018-10-18 | End: 2018-10-18

## 2018-10-18 RX ORDER — MIDAZOLAM HYDROCHLORIDE 1 MG/ML
INJECTION, SOLUTION INTRAMUSCULAR; INTRAVENOUS
Status: DISCONTINUED | OUTPATIENT
Start: 2018-10-18 | End: 2018-10-18

## 2018-10-18 RX ORDER — HEPARIN SODIUM 1000 [USP'U]/ML
INJECTION, SOLUTION INTRAVENOUS; SUBCUTANEOUS
Status: DISCONTINUED | OUTPATIENT
Start: 2018-10-18 | End: 2018-10-18

## 2018-10-18 RX ORDER — SODIUM CHLORIDE 9 MG/ML
INJECTION, SOLUTION INTRAVENOUS CONTINUOUS PRN
Status: DISCONTINUED | OUTPATIENT
Start: 2018-10-18 | End: 2018-10-18

## 2018-10-18 RX ORDER — LIDOCAINE HYDROCHLORIDE AND EPINEPHRINE 20; 10 MG/ML; UG/ML
INJECTION, SOLUTION INFILTRATION; PERINEURAL
Status: COMPLETED | OUTPATIENT
Start: 2018-10-18 | End: 2018-10-18

## 2018-10-18 RX ORDER — SODIUM CHLORIDE 9 MG/ML
INJECTION, SOLUTION INTRAVENOUS CONTINUOUS
Status: DISCONTINUED | OUTPATIENT
Start: 2018-10-18 | End: 2018-10-18 | Stop reason: HOSPADM

## 2018-10-18 RX ORDER — MUPIROCIN 20 MG/G
OINTMENT TOPICAL
Status: DISCONTINUED | OUTPATIENT
Start: 2018-10-18 | End: 2018-10-18 | Stop reason: HOSPADM

## 2018-10-18 RX ORDER — PROPOFOL 10 MG/ML
VIAL (ML) INTRAVENOUS
Status: DISCONTINUED | OUTPATIENT
Start: 2018-10-18 | End: 2018-10-18

## 2018-10-18 RX ORDER — LIDOCAINE HYDROCHLORIDE 10 MG/ML
1 INJECTION, SOLUTION EPIDURAL; INFILTRATION; INTRACAUDAL; PERINEURAL ONCE
Status: COMPLETED | OUTPATIENT
Start: 2018-10-18 | End: 2018-10-18

## 2018-10-18 RX ADMIN — KETAMINE HYDROCHLORIDE 10 MG: 10 INJECTION, SOLUTION INTRAMUSCULAR; INTRAVENOUS at 09:10

## 2018-10-18 RX ADMIN — PROPOFOL 10 MG: 10 INJECTION, EMULSION INTRAVENOUS at 09:10

## 2018-10-18 RX ADMIN — MIDAZOLAM HYDROCHLORIDE 1 MG: 1 INJECTION, SOLUTION INTRAMUSCULAR; INTRAVENOUS at 07:10

## 2018-10-18 RX ADMIN — LIDOCAINE HYDROCHLORIDE AND EPINEPHRINE 30 ML: 20; 10 INJECTION, SOLUTION INFILTRATION; PERINEURAL at 07:10

## 2018-10-18 RX ADMIN — KETAMINE HYDROCHLORIDE 10 MG: 10 INJECTION, SOLUTION INTRAMUSCULAR; INTRAVENOUS at 07:10

## 2018-10-18 RX ADMIN — KETAMINE HYDROCHLORIDE 10 MG: 10 INJECTION, SOLUTION INTRAMUSCULAR; INTRAVENOUS at 08:10

## 2018-10-18 RX ADMIN — PROPOFOL 20 MG: 10 INJECTION, EMULSION INTRAVENOUS at 07:10

## 2018-10-18 RX ADMIN — EPHEDRINE SULFATE 10 MG: 50 INJECTION, SOLUTION INTRAMUSCULAR; INTRAVENOUS; SUBCUTANEOUS at 07:10

## 2018-10-18 RX ADMIN — MUPIROCIN: 20 OINTMENT TOPICAL at 06:10

## 2018-10-18 RX ADMIN — EPHEDRINE SULFATE 10 MG: 50 INJECTION, SOLUTION INTRAMUSCULAR; INTRAVENOUS; SUBCUTANEOUS at 09:10

## 2018-10-18 RX ADMIN — CEFAZOLIN 2 G: 330 INJECTION, POWDER, FOR SOLUTION INTRAMUSCULAR; INTRAVENOUS at 07:10

## 2018-10-18 RX ADMIN — PHENYLEPHRINE HYDROCHLORIDE 100 MCG: 10 INJECTION INTRAVENOUS at 09:10

## 2018-10-18 RX ADMIN — SODIUM CHLORIDE: 0.9 INJECTION, SOLUTION INTRAVENOUS at 08:10

## 2018-10-18 RX ADMIN — PROTAMINE SULFATE 5 MG: 10 INJECTION, SOLUTION INTRAVENOUS at 09:10

## 2018-10-18 RX ADMIN — PROPOFOL 150 MCG/KG/MIN: 10 INJECTION, EMULSION INTRAVENOUS at 07:10

## 2018-10-18 RX ADMIN — HEPARIN SODIUM 1600 UNITS: 1000 INJECTION, SOLUTION INTRAVENOUS; SUBCUTANEOUS at 11:10

## 2018-10-18 RX ADMIN — EPHEDRINE SULFATE 10 MG: 50 INJECTION, SOLUTION INTRAMUSCULAR; INTRAVENOUS; SUBCUTANEOUS at 08:10

## 2018-10-18 RX ADMIN — OXYCODONE HYDROCHLORIDE 5 MG: 5 TABLET ORAL at 02:10

## 2018-10-18 RX ADMIN — SODIUM CHLORIDE: 0.9 INJECTION, SOLUTION INTRAVENOUS at 06:10

## 2018-10-18 RX ADMIN — HEPARIN SODIUM 5000 UNITS: 1000 INJECTION, SOLUTION INTRAVENOUS; SUBCUTANEOUS at 08:10

## 2018-10-18 RX ADMIN — ACETAMINOPHEN 1000 MG: 500 TABLET ORAL at 12:10

## 2018-10-18 RX ADMIN — LIDOCAINE HYDROCHLORIDE 1 MG: 10 INJECTION, SOLUTION EPIDURAL; INFILTRATION; INTRACAUDAL at 06:10

## 2018-10-18 RX ADMIN — TRAMADOL HYDROCHLORIDE 50 MG: 50 TABLET, FILM COATED ORAL at 11:10

## 2018-10-18 RX ADMIN — SODIUM CHLORIDE 1 G: 9 INJECTION, SOLUTION INTRAVENOUS at 07:10

## 2018-10-18 NOTE — BRIEF OP NOTE
Brief Operative Note  Date: 10/18/2018    Pre-op Diagnosis:  ESRD (end stage renal disease) [N18.6]    Post-op Diagnosis:  Same    Procedure(s):  RUE AVG creation, gore interring 4-7mm tapered brachial artery/vein    Surgeon: Meir Valencia MD FACS    Assistant:  Lucy Roman MD - Fellow    Anesthesia: Local MAC    Findings/Key Components:  Fair thrill palpable throughout AVG  SBP 90s - 100s dev-op  2+ R radial pulse; minimal augmentation from strong biphasic to triphasic in R radial and ulnar doppler signals with AVG compression  Hold nifedipine post-op    EBL: Minimal      Specimens (From admission, onward)    None        I attest to being present for the procedure and performing the case.  Meir Valencia MD FACS  Discharge Note  SUMMARY    Admit Date: 10/18/2018    Attending Physician: Meir Valencia MD FACS    Discharge Physician: Meir Valencia MD FACS    Discharge Date: 10/18/2018    Final Diagnosis: ESRD (end stage renal disease) [N18.6]    Outcome of Treatment: Successful RUE AVG creation    Disposition: Home or self-care    Patient Instructions:   Current Discharge Medication List      CONTINUE these medications which have NOT CHANGED    Details   acetaminophen (TYLENOL) 325 MG tablet Take 2 tablets (650 mg total) by mouth every 6 (six) hours as needed for Pain.  Refills: 0      albuterol (PROVENTIL) 2.5 mg /3 mL (0.083 %) nebulizer solution Take 3 mLs (2.5 mg total) by nebulization every 6 (six) hours as needed for Wheezing. Rescue  Qty: 25 each, Refills: 3    Associated Diagnoses: Chest congestion      alprazolam (XANAX ORAL) Take 0.5 mg by mouth as needed (anxiety).       aspirin (ECOTRIN) 81 MG EC tablet Take 81 mg by mouth every morning.       atorvastatin (LIPITOR) 40 MG tablet TAKE 1 TABLET ONE TIME DAILY FOR CHOLESTEROL  Qty: 90 tablet, Refills: 2    Associated Diagnoses: Pure hypercholesterolemia      carvedilol (COREG) 25 MG tablet Take 1 tablet (25 mg total) by mouth 2 (two) times daily.  Qty: 180  tablet, Refills: 2    Associated Diagnoses: Hypertension, unspecified type      cyclobenzaprine (FLEXERIL) 5 MG tablet 2 tabs to start and then 1 tab Q8 hours as needed for muscle spasm  Qty: 30 tablet, Refills: 0      DULCOLAX, BISACODYL, ORAL Take by mouth as needed (constipation).      ergocalciferol (ERGOCALCIFEROL) 50,000 unit Cap 1 capsule (50,000 Units total) by Per G Tube route every 7 days.  Qty: 12 capsule, Refills: 2      famotidine (PEPCID) 20 MG tablet Take 1 tablet (20 mg total) by mouth once daily.  Qty: 90 tablet, Refills: 2    Associated Diagnoses: Hemorrhagic cerebrovascular accident (CVA)      ferrous sulfate 220 mg (44 mg iron)/5 mL solution 10ml daily for Iron/Give via PEG  Qty: 300 mL, Refills: 6    Associated Diagnoses: Anemia, unspecified type      folic acid (FOLVITE) 1 MG tablet Take 1 tablet (1 mg total) by mouth once daily.  Qty: 90 tablet, Refills: 2    Associated Diagnoses: Folate deficiency      furosemide (LASIX) 40 MG tablet Take 1 tablet (40 mg total) by mouth once daily.  Qty: 90 tablet, Refills: 2      levETIRAcetam (KEPPRA) 500 MG Tab Take 1 tablet (500 mg total) by mouth every 8 (eight) hours.  Qty: 270 tablet, Refills: 2    Associated Diagnoses: Hemorrhagic cerebrovascular accident (CVA)      NIFEdipine (ADALAT CC) 90 MG TbSR Take 1 tablet (90 mg total) by mouth once daily.  Qty: 90 tablet, Refills: 2      polyethylene glycol 3350 (MIRALAX ORAL) Take by mouth every evening.       pregabalin (LYRICA) 25 MG capsule 1 cap in AM and 2 caps at Bedtime for muscle spasm  Qty: 270 capsule, Refills: 2    Associated Diagnoses: Muscle spasticity      sodium bicarbonate 650 MG tablet Take 2 tablets (1,300 mg total) by mouth 2 (two) times daily.  Qty: 360 tablet, Refills: 2      traZODone (DESYREL) 100 MG tablet Take 1 tablet (100 mg total) by mouth every evening.  Qty: 90 tablet, Refills: 1    Associated Diagnoses: Insomnia, unspecified type      BD INSULIN PEN NEEDLE UF SHORT 31 gauge x  "5/16" Ndle USE TO INJECT NOVOLOG FLEXPEN BEFORE MEALS  Qty: 150 each, Refills: 11      blood sugar diagnostic (ACCU-CHEK SMARTVIEW TEST STRIP) Strp 1 strip by Misc.(Non-Drug; Combo Route) route 2 (two) times daily.  Qty: 300 each, Refills: 3    Associated Diagnoses: Type 2 diabetes mellitus with chronic kidney disease      sevelamer carbonate (RENVELA) 800 mg Tab Take 800 mg by mouth 3 (three) times daily with meals.             Diet:  Resume pre-operative diet    Activity:  Ad darby    Follow-up:  Follow-up in clinic with Dr Valencia within 2 weeks; please call clinic nurse at     "

## 2018-10-18 NOTE — PLAN OF CARE
Blood glucose not checked. Patient and family deny DM II. Family states that patient no longer on DM medications.

## 2018-10-18 NOTE — PROGRESS NOTES
Summary:  Chart review - pt currently inpatient    Interventions:  Chart review    Plan:  Discuss if reviewed/completed Medicaid application  Re-assess for further needs

## 2018-10-18 NOTE — TRANSFER OF CARE
Anesthesia Transfer of Care Note    Patient: Lucy Perez    Procedure(s) Performed: Procedure(s) (LRB):  AV GRAFT CREATION (Right)    Patient location: [facility]    Anesthesia Type: MAC    Transport from OR: Transported from OR on 2-3 L/min O2 by NC with adequate spontaneous ventilation    Post pain: adequate analgesia    Post assessment: no apparent anesthetic complications and tolerated procedure well    Post vital signs: stable    Level of consciousness: awake    Nausea/Vomiting: no nausea/vomiting    Complications: none    Transfer of care protocol was followed    BP (!) 103/54   Pulse 62   Temp 36.3 °C (97.3 °F) (Temporal)   Resp 16   LMP  (LMP Unknown)   SpO2 100%   Breastfeeding? No

## 2018-10-18 NOTE — PLAN OF CARE
Problem: Patient Care Overview  Goal: Plan of Care Review  Outcome: Outcome(s) achieved Date Met: 10/18/18  Patient is awake and alert. VSS. Her pain is finally tolerable with oxycodone IR. Dr. Roman came to evaluate patient and gave her a RX with Percocet as well as ultram and told patient she can alternate the 2 for pain control.  understands principle. Dressing x 2 changed to R AV graft site with telfa and paper tape because  states that tegaderm very irritating to patient's skin if left on too long. Patient's Dialysis catheter was flushed per protocol and caps applied. Patient denies nausea. Tolerating liquids well. Sling applied to R arm until she gets full movement and feeling back to arm. DC instructions given to  and patient and they verbalize understanding.

## 2018-10-18 NOTE — INTERVAL H&P NOTE
The patient has been examined and the H&P has been reviewed:    I concur with the findings and no changes have occurred since H&P was written.    Anesthesia/Surgery risks, benefits and alternative options discussed and understood by patient/family.    Right upper arm AV graft      There are no hospital problems to display for this patient.

## 2018-10-18 NOTE — PLAN OF CARE
Patient has R chest wall katarina. Unable to start PIV on RUE planned surgical site. Per anesthesia will use R chest wall katarina as access rather than accessing the R EJ. Patient to OR; plan is to access katarina in the OR.

## 2018-10-18 NOTE — ANESTHESIA PROCEDURE NOTES
Supraclavicular Brachial Plexus Single Injection Block    Patient location during procedure: OR    Reason for block: primary anesthetic   Diagnosis: AVF   Start time: 10/18/2018 7:05 AM  Timeout: 10/18/2018 7:05 AM   End time: 10/18/2018 7:11 AM  Staffing  Anesthesiologist: Kevin Jeter MD  Other anesthesia staff: Waldo Servin MD  Performed: other anesthesia staff   Preanesthetic Checklist  Completed: patient identified, site marked, surgical consent, pre-op evaluation, timeout performed, IV checked, risks and benefits discussed and monitors and equipment checked  Peripheral Block  Patient position: supine  Prep: ChloraPrep  Patient monitoring: heart rate, cardiac monitor, continuous pulse ox, continuous capnometry and frequent blood pressure checks  Block type: supraclavicular  Laterality: right  Injection technique: single shot  Needle  Needle type: Stimuplex   Needle gauge: 22 G  Needle length: 2 in  Needle localization: anatomical landmarks and ultrasound guidance   -ultrasound image captured on disc.  Assessment  Injection assessment: negative aspiration, negative parasthesia and local visualized surrounding nerve  Paresthesia pain: none  Heart rate change: no  Slow fractionated injection: yes  Additional Notes  Intercostal brachial field block performed with lidocaine 2% with epi 10ml for additional coverage.    VSS.  See anesthesia record.  Patient tolerated procedure well.

## 2018-10-18 NOTE — DISCHARGE INSTRUCTIONS
Caring for Your Hemodialysis Access  A problem such as an infection or a blood clot may make the access unusable. Typically, this happens more often with an arteriovenous graft than with an arteriovenous fistula. If this happens, youll need a new access. To help your access last, you will need to follow certain guidelines.  Watching for problems  Call your healthcare provider right away if you:  · Cant feel the blood flowing in the access (this sensation is called a thrill)  · Have pain or numbness in your hand or arm  · Have bleeding, redness, bluish discoloration, or warmth around your access  · Notice your access suddenly bulging out more than usual (a slight bulge is normal)  · Have a fever of 100.4°F (38°C) or higher, or as directed by your healthcare provider   Follow these and any other guidelines youre given  · Dont wear tight clothes or jewelry around your access.  · Dont let anyone take your blood pressure on or draw blood from the arm with the access. Also, dont let anyone put IV lines into it.  · Protect your access from being hit or cut.  · Wash your hands often and keep the area around the access clean.  · Do not carry anything heavy or do anything that would put pressure on the access.  Feeling for your thrill    If you put your fingers over your access, you should feel the blood rushing through it. This is called a thrill, and it feels like a vibration. Feel for the thrill as often as you're told, usually once or twice a day. If you can't feel it, tell your healthcare provider right away. Blood may not be flowing through your access the way it should.  Important numbers  Write the names and numbers of your healthcare providers below. That way you will know how to get in touch with them.  Doctor:  Name ___________________ Phone ___________________  Surgeon:  Name ___________________ Phone ___________________  Dialysis Center:  Name ___________________ Phone ___________________   Date Last  Reviewed: 1/1/2017  © 3202-6987 [email or web address]. [address] [place] [address], [place] [address]. All rights reserved. This information is not intended as a substitute for professional medical care. Always follow your healthcare professional's instructions.

## 2018-10-19 NOTE — ANESTHESIA POSTPROCEDURE EVALUATION
Anesthesia Post Evaluation    Patient: Lucy Perez    Procedure(s) Performed: Procedure(s) (LRB):  AV GRAFT CREATION (Right)    Final Anesthesia Type: MAC  Patient location during evaluation: PACU  Patient participation: Yes- Able to Participate  Level of consciousness: awake and alert  Post-procedure vital signs: reviewed and stable  Pain management: adequate  Airway patency: patent  PONV status at discharge: No PONV  Anesthetic complications: no      Cardiovascular status: blood pressure returned to baseline  Respiratory status: spontaneous ventilation and room air  Hydration status: euvolemic  Follow-up not needed.        Visit Vitals  /60   Pulse 65   Temp 36.6 °C (97.9 °F) (Temporal)   Resp 18   LMP  (LMP Unknown)   SpO2 99%   Breastfeeding? No       Pain/Samia Score: Pain Assessment Performed: Yes (10/18/2018  3:18 PM)  Presence of Pain: complains of pain/discomfort (10/18/2018  6:16 AM)  Pain Rating Prior to Med Admin: 10 (10/18/2018  2:19 PM)  Samia Score: 10 (10/18/2018 10:10 AM)

## 2018-10-21 NOTE — OP NOTE
Date: 10/18/2018     Pre-op Diagnosis:  ESRD (end stage renal disease) [N18.6]     Post-op Diagnosis:  Same     Procedure(s):  RUE AVG creation, gore interring 4-7mm tapered brachial artery/vein     Surgeon: Meir Valencia MD FACS     Assistant:  Lucy Roman MD - Fellow     Anesthesia: Local MAC     Findings/Key Components:  Fair thrill palpable throughout AVG  SBP 90s - 100s dev-op  2+ R radial pulse; minimal augmentation from strong biphasic to triphasic in R radial and ulnar doppler signals with AVG compression  Hold nifedipine post-op     EBL: Minimal          Specimens (From admission, onward)     None          I attest to being present for the procedure and performing the case.  Meir Valencia MD FACS    Antibiotic: 1g iv Vancomycin    After an informed consent was obtained the patient was taken to the operating room and placed in the supine position. The patient  underwent a regional block by anesthesia. An Ultrasound was used to assess the upper extremity veins and noted R to be adequate for AV graft placement. The R upper extremity was prepped and draped in a sterile fashion. A skin incision was made just above the AC fossa and dissection carried down to the brachial artery. We then made a second incision in the proximal arm/axilla area, dissected through the subcutaneous tissues and identified the proximal brachial/axillary vein. The artery was controlled with vessel loops, opened and flushed with heparinized saline. A 4-7mm Rosanky Interring/ PTFE graft was tunneled and the 4mm end sew end-to-side to the brachial artery with 6-0 Prolene. The distal, 7mm, end of the graft was similarly sewn end-to-side to the axillary vein with 6-0 Goretext suture. After completion of the anastomosis, the vessel loops were released. Hemostasis was secured. Good thrill was noted in the graft and the incision was then closed in two layers, subcutaneous tissue with 3-0 Monocryl and skin with 4-0 Monocryl. Steri-Strips were  applied.

## 2018-10-22 ENCOUNTER — NURSE TRIAGE (OUTPATIENT)
Dept: ADMINISTRATIVE | Facility: CLINIC | Age: 58
End: 2018-10-22

## 2018-10-22 ENCOUNTER — OUTPATIENT CASE MANAGEMENT (OUTPATIENT)
Dept: ADMINISTRATIVE | Facility: OTHER | Age: 58
End: 2018-10-22

## 2018-10-22 NOTE — TELEPHONE ENCOUNTER
Reason for Disposition   [1] Caller requests to speak ONLY to PCP AND [2] URGENT question    Protocols used: ST PCP CALL - NO TRIAGE-A-AH    Pts daughter would like to speak with PCP concerning seizure activity.  Will message MD and Staff.

## 2018-10-22 NOTE — PROGRESS NOTES
"Summary:  Spoke with pt's  regarding follow-up  - pt's  states that pt is having pain in the R arm where they placed the fistula - pt husbands states he has been giving her Tramadol and Percocet - Pt's  states she had difficulty swallowing -stating she inessa be "seizing" or maybe he thinks she was just waking up. Pt  reports that pt did urinate a lot yesterday - reporting that they did not go to dialysis on Friday because pt was in too much pain. Pt's  denies any falls or SOB - reported taht pt was sleeping right now - also reported he noted that she did begin using her right arm to feed herself yesterday.. Pt's  states that they had to go to dialysis today since they missed Friday.    Interventions:  Encouraged pt's  to notify PCP regarding difficulty swallowing and observations using pt [facility] Account  Encouraged Pt's  to call back if he had any questions     Plan:  Follow-up 10-26-18 regarding any further issues.  Discuss discharge if pt remains the same    Todays OPCM Self-Management Care Plan was developed with the patients/caregivers input and was based on identified barriers from todays assessment.  Goals were written today with the patient/caregiver and the patient has agreed to work towards these goals to improve his/her overall well-being. Patient verbalized understanding of the care plan, goals, and all of today's instructions. Encouraged patient/caregiver to communicate with his/her physician and health care team about health conditions and the treatment plan.  Provided my contact information today and encouraged patient/caregiver to call me with any questions as needed.  "

## 2018-10-22 NOTE — TELEPHONE ENCOUNTER
Spoke with Mr. Hdz, he states that Ms. Ayala did not have a seizure but was just drowsy from the pain medication. I advised him to take her to the ER if she does have a seizure or any related symptoms. Mr. Hdz expressed great understanding and had no questions.

## 2018-10-22 NOTE — TELEPHONE ENCOUNTER
Isaac, please call pt's daughter, Reina and if pt has had another seizure, she needs to go to the ER ASAP.  Let me know.  Thanks

## 2018-10-25 ENCOUNTER — OUTPATIENT CASE MANAGEMENT (OUTPATIENT)
Dept: ADMINISTRATIVE | Facility: OTHER | Age: 58
End: 2018-10-25

## 2018-10-25 NOTE — PROGRESS NOTES
Summary:  LCSW contacted pt for follow up. Pt stated she is doing fair. PT reports she in the a lot of pain at her surgical site; however, she has a follow up appointment with vascular surgery and will address her concerns then. PT reports she gets some relief after taking medication. Pt stated she and her  completed the Medicaid application and is awaiting feedback on it. Pt reports she would like to apply for PACS and would need a new application. Pt denied other needs to be addressed at this time.     Interventions:  Financial assistance - re-mailed PACS contact information/application  Encouraged communication and compliance with medical treatment    Plan:  Re-assess for further needs    Todays OPCM Self-Management Care Plan was developed with the patients/caregivers input and was based on identified barriers from todays assessment.  Goals were written today with the patient/caregiver and the patient has agreed to work towards these goals to improve his/her overall well-being. Patient verbalized understanding of the care plan, goals, and all of today's instructions. Encouraged patient/caregiver to communicate with his/her physician and health care team about health conditions and the treatment plan.  Provided my contact information today and encouraged patient/caregiver to call me with any questions as needed.

## 2018-11-01 ENCOUNTER — OFFICE VISIT (OUTPATIENT)
Dept: VASCULAR SURGERY | Facility: CLINIC | Age: 58
End: 2018-11-01
Payer: MEDICARE

## 2018-11-01 ENCOUNTER — HOSPITAL ENCOUNTER (OUTPATIENT)
Dept: VASCULAR SURGERY | Facility: CLINIC | Age: 58
Discharge: HOME | End: 2018-11-01
Payer: MEDICARE

## 2018-11-01 VITALS
HEART RATE: 57 BPM | BODY MASS INDEX: 31.18 KG/M2 | DIASTOLIC BLOOD PRESSURE: 75 MMHG | HEIGHT: 63 IN | WEIGHT: 176 LBS | SYSTOLIC BLOOD PRESSURE: 121 MMHG

## 2018-11-01 DIAGNOSIS — N18.6 ESRD (END STAGE RENAL DISEASE): ICD-10-CM

## 2018-11-01 DIAGNOSIS — N18.6 ESRD (END STAGE RENAL DISEASE): Primary | ICD-10-CM

## 2018-11-01 PROCEDURE — 99999 PR PBB SHADOW E&M-EST. PATIENT-LVL III: CPT | Mod: PBBFAC,,, | Performed by: SURGERY

## 2018-11-01 PROCEDURE — 99024 POSTOP FOLLOW-UP VISIT: CPT | Mod: S$GLB,,, | Performed by: SURGERY

## 2018-11-01 PROCEDURE — 99213 OFFICE O/P EST LOW 20 MIN: CPT | Mod: PBBFAC | Performed by: SURGERY

## 2018-11-01 PROCEDURE — 93990 DOPPLER FLOW TESTING: CPT | Mod: S$GLB,,, | Performed by: SURGERY

## 2018-11-01 NOTE — PATIENT INSTRUCTIONS
Creating a Hemodialysis Access    Before hemodialysis can be done, a way for blood to leave and return to your body (an access) is needed. During hemodialysis, needles placed into the access carry blood to and from the dialyzer. A hemodialysis access is usually created in your arm. The 2 main types of accesses are an arteriovenous fistula (AV fistula) and an arteriovenous graft (AV graft).  Creating your access  The hemodialysis access provides a large volume of rapidly flowing blood. It involves a surgical operation under anesthesia. You may be able to go home the same day. It is made during a short procedure using one of these methods:  · A fistula is made by connecting an artery to a nearby vein. The high pressure and blood flow in the artery are transferred into the vein and help it grow in size and thickness. This enlarged vein (fistula) eventually has high blood flow and is thick enough for needles to be placed safely several times each week during hemodialysis. It may need weeks or months to develop before it's ready to be used. A fistula is more efficient than the graft and has fewer long-term problems. Therefore, it is the preferred form of access.  · A graft (piece of synthetic tube) may be sewn between an artery and a vein if a fistula is not possible because of the small size of your veins. Blood flows rapidly through the graft from the artery to the vein. A graft is usually ready to use in a few weeks. Needles can be placed into the plastic tube to obtain blood during dialysis.  Both types of access may take weeks to months before they can be used. If dialysis is necessary immediately, a temporary venous catheter is used. A catheter that allows two way blood flow is placed into a large vein and the dialysis tubing is connected to the catheter. If both the AV fistula and graft are unsuccessful, a more permanent venous catheter is used.  The most common complications for hemodialysis access are  infection, clotting and decreased blood flow from clotting or other narrowing.  Date Last Reviewed: 1/1/2017  © 2966-9844 [employer]. [address] [place] [address], [place] [address]. All rights reserved. This information is not intended as a substitute for professional medical care. Always follow your healthcare professional's instructions.

## 2018-11-01 NOTE — ASSESSMENT & PLAN NOTE
Hgb 9.4, can continue manage outpatient   Reached out to pt on 10/25/18 in reference to overdue HM.

## 2018-11-01 NOTE — PROGRESS NOTES
Lucy Aubrey Perez  11/01/2018    HPI:   Patient is a 58 y.o. female with a history of HTN, HLD, L hemiplegia after large stroke in 2016, DM II, stage V CKD since August 2018 MWF for f/u    She has a residual impairment on her left upper extremity and left lower extremity from her stroke and is currently wheel chair bound. She has complete function of her right arm currently. She is right handed. No history of trauma to right arm.     Was seeing Dr. Blank for nephology but he no longer works at [employer].     s/p  10/18/18  RUE AVG creation, gore interring 4-7mm tapered brachial artery/vein    Findings/Key Components:  Fair thrill palpable throughout AVG  SBP 90s - 100s dev-op  2+ R radial pulse; minimal augmentation from strong biphasic to triphasic in R radial and ulnar doppler signals with AVG compression  Hold nifedipine post-op    Retired  at [employer].     No MI, but history of stroke in 2016 (hemorragic)   Tobacco use: never use    Renal is Dr LING Perez    Past Medical History:   Diagnosis Date    Anemia of chronic disease 6/10/2017    Anxiety     Arthritis of right acromioclavicular joint 7/2/2014    Asthma     Bipolar disorder     Bronchitis, acute     Cataract     Cholelithiasis     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    Cortical cataract of both eyes 7/26/2016    Degeneration of lumbar or lumbosacral intervertebral disc 3/5/2013    S/p MRI L-spine 5/2009     Depression     ESRD on hemodialysis     Started August 2018    General anesthetics causing adverse effect in therapeutic use     Hemorrhagic cerebrovascular accident (CVA)     8/2016 s/p Hemicraniot[place] [facility] with Left hemiparesis    Hemorrhagic cerebrovascular accident (CVA) 1/3/2018    History of stroke 6/28/2017    s/p R-MCA stroke with R-putaminal hemorrhagic transformation in 8/2016 and 11/2016 (s/p hemicraniotomy at [facility]) with residual L hemiparesis, on AED s/p CVA       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    Hyperlipidemia     Hypertensive retinopathy of both eyes 7/26/2016    Impingement syndrome of right shoulder 7/2/2014    Left ventricular diastolic dysfunction with preserved systolic function 12/15/2015    Mixed anxiety and depressive disorder 3/5/2013    Obesity     THERESA (obstructive sleep apnea) 3/5/2013    No Home CPAP 2ndary to cost     Partial symptomatic epilepsy with complex partial seizures, not intractable, without status epilepticus     PEG (percutaneous endoscopic gastrostomy) status 6/28/2017    Renovascular hypertension 3/5/2013    Will resume home medications: amlodipine, labetalol, and hydralazine Will hold Lisinopril in setting of DARLING.   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 without macular edema, without long-term current use of insulin 8/2/2017    Type 2 diabetes mellitus with diabetic polyneuropathy, without long-term current use of insulin 10/22/2015    Type 2 diabetes mellitus with stage 3 chronic kidney disease, without long-term current use of insulin 10/22/2015    Vertebral artery stenosis 3/5/2013    S/p Stenting per Dr Burnett      Past Surgical History:   Procedure Laterality Date    ARTHROSCOPY-SHOULDER WITH SUBACROMIAL DECOMPRESSION Right 7/9/2014    Per[facility]all [facility]ja [facility] at [facility] OR    AV GRAFT CREATION Right 10/18/2018    Performed by Meir Valencia [facility] at [facility] OR 2ND FLR    Biceps Tenotomy Right 7/9/2014    Performed by Kendall [facility]ja [facility] at [facility] OR    BREAST SURGERY      breast reduction    COLONOSCOPY N/A 8/11/2016    Procedure: COLONOSCOPY;  Surgeon: Jerry Vilchis MD;  Location:  "[facility] ENDO (4TH FLR);  Service: Endoscopy;  Laterality: N/A;  Patient reports difficulty awaking from anesthesia in the past.    COLONOSCOPY N/A 8/11/2016    Performed by Jerry Vilchis MD at [facility] ENDO (4TH FLR)    DEBRIDEMENT-SHOULDER-ARTHROSCOPIC Labral Cuff Right 7/9/2014    Per[facility] by [facility] at [facility] OR    ESOPHAGOGASTRODUODENOSCOPY (EGD) N/A 12/6/2017    Per[facility] Dallas Lock Jr., [facility] END[facility]    NPSOIDXO-[id number] END Right 7/9/2014    Performed by Kendall [facility]antoja MD at [facility] OR    HYSTERECTOMY      PLACEMENT OF ARTERIOVENOUS GRAFT Right 10/18/2018    Procedure: AV GRAFT CREATION;  Surgeon: Meir Valencia MD;  Location: [facility] OR 2ND FLR;  Service: Cardiovascular;  Laterality: Right;    ROTATOR CUFF REPAIR Right July 9, 2014    right side    Skull surgery      Aneurysm    stent placed      in vertebral artery    TOTAL REDUCTION MAMMOPLASTY      TUBAL LIGATION       Family History   Problem Relation Age of Onset    Cancer Mother 55        Breast cancer    Diabetes Mother     Hypertension Mother     Heart disease Mother     Heart attack Mother     Breast cancer Mother     Stroke Sister     Hypertension Sister     Sleep apnea Sister     No Known Problems Daughter     Bell's palsy Sister     Lupus Sister     Blindness Maternal Grandmother     Diabetes Unknown         "My entire family family has diabetes"    Stroke Maternal Aunt     Amblyopia Neg Hx     Cataracts Neg Hx     Glaucoma Neg Hx     Macular degeneration Neg Hx     Retinal detachment Neg Hx     Strabismus Neg Hx      Social History     Socioeconomic History    Marital status:      Spouse name: Not on file    Number of children: Not on file    Years of education: Not on file    Highest education level: Not on file   Social Needs    Financial resource strain: Not on file    Food insecurity - worry: Not on file    Food insecurity - inability: Not on file    Transportation " "needs - medical: Not on file    Transportation needs - non-medical: Not on file   Occupational History    Not on file   Tobacco Use    Smoking status: Never Smoker    Smokeless tobacco: Never Used   Substance and Sexual Activity    Alcohol use: No     Comment: occasional wine cooler     Drug use: No    Sexual activity: Yes     Partners: Male   Other Topics Concern    Not on file   Social History Narrative    . 2 children(Wilder and Reina). Does not work. Previously worked as a .      Current Outpatient Medications on File Prior to Visit   Medication Sig    acetaminophen (TYLENOL) 325 MG tablet Take 2 tablets (650 mg total) by mouth every 6 (six) hours as needed for Pain.    alprazolam (XANAX ORAL) Take 0.5 mg by mouth as needed (anxiety).     aspirin (ECOTRIN) 81 MG EC tablet Take 81 mg by mouth every morning.     atorvastatin (LIPITOR) 40 MG tablet TAKE 1 TABLET ONE TIME DAILY FOR CHOLESTEROL (Patient taking differently: Take 40 mg by mouth every evening. TAKE 1 TABLET ONE TIME DAILY FOR CHOLESTEROL)    BD INSULIN PEN NEEDLE UF SHORT 31 gauge x 5/16" Ndle USE TO INJECT NOVOLOG FLEXPEN BEFORE MEALS    blood sugar diagnostic (ACCU-CHEK SMARTVIEW TEST STRIP) Strp 1 strip by Misc.(Non-Drug; Combo Route) route 2 (two) times daily.    carvedilol (COREG) 25 MG tablet Take 1 tablet (25 mg total) by mouth 2 (two) times daily.    cyclobenzaprine (FLEXERIL) 5 MG tablet 2 tabs to start and then 1 tab Q8 hours as needed for muscle spasm    DULCOLAX, BISACODYL, ORAL Take by mouth as needed (constipation).    ergocalciferol (ERGOCALCIFEROL) 50,000 unit Cap 1 capsule (50,000 Units total) by Per G Tube route every 7 days. (Patient taking differently: Take 50,000 Units by mouth every 7 days. Takes on Thurs)    famotidine (PEPCID) 20 MG tablet Take 1 tablet (20 mg total) by mouth once daily. (Patient taking differently: Take 20 mg by mouth every morning. )    ferrous sulfate 220 mg (44 mg " iron)/5 mL solution 10ml daily for Iron/Give via PEG (Patient taking differently: Take 220 mg by mouth every morning. )    folic acid (FOLVITE) 1 MG tablet Take 1 tablet (1 mg total) by mouth once daily. (Patient taking differently: Take 1 mg by mouth every morning. )    furosemide (LASIX) 40 MG tablet Take 1 tablet (40 mg total) by mouth once daily. (Patient taking differently: Take 40 mg by mouth every morning. )    levETIRAcetam (KEPPRA) 500 MG Tab Take 1 tablet (500 mg total) by mouth every 8 (eight) hours.    oxyCODONE-acetaminophen (PERCOCET) 5-325 mg per tablet Take 1 tablet by mouth every 4 (four) hours as needed for Pain.    polyethylene glycol 3350 (MIRALAX ORAL) Take by mouth every evening.     pregabalin (LYRICA) 25 MG capsule 1 cap in AM and 2 caps at Bedtime for muscle spasm    sevelamer carbonate (RENVELA) 800 mg Tab Take 800 mg by mouth 3 (three) times daily with meals.    sodium bicarbonate 650 MG tablet Take 2 tablets (1,300 mg total) by mouth 2 (two) times daily.    traZODone (DESYREL) 100 MG tablet Take 1 tablet (100 mg total) by mouth every evening.    albuterol (PROVENTIL) 2.5 mg /3 mL (0.083 %) nebulizer solution Take 3 mLs (2.5 mg total) by nebulization every 6 (six) hours as needed for Wheezing. Rescue     Current Facility-Administered Medications on File Prior to Visit   Medication    onabotulinumtoxina injection 300 Units       REVIEW OF SYSTEMS:  General: negative; ENT: negative; Allergy and Immunology: negative; Hematological and Lymphatic: Negative; Endocrine: negative; Respiratory: no cough, shortness of breath, or wheezing; Cardiovascular: no chest pain or dyspnea on exertion; Gastrointestinal: no abdominal pain/back, change in bowel habits, or bloody stools; Genito-Urinary: no dysuria, trouble voiding, or hematuria; Musculoskeletal: negative  Neurological: no TIA or stroke symptoms    PHYSICAL EXAM:      Pulse: (!) 57   Vitals:    11/01/18 1344   BP: 121/75   Pulse: (!) 57        General appearance:  Alert, well-appearing, and in no distress.  Oriented to person, place, and time   Neurological: Normal speech, no focal findings noted; CN II - XII grossly intact           Musculoskeletal: Digits/nail without cyanosis/clubbing.  Normal muscle strength/tone.                 Neck: Supple, no significant adenopathy; thyroid is not enlarged                  No carotid bruit can be auscultated                Chest:  Clear to auscultation, no wheezes, rales or rhonchi, symmetric air entry right permacath     No use of accessory muscles             Cardiac: Normal rate and regular rhythm, S1 and S2 normal; PMI non-displaced          Abdomen: Soft, nontender, nondistended, no masses or organomegaly     No rebound tenderness noted; bowel sounds normal     Pulsatile aortic mass is no palpable.     No groin adenopathy      Extremities:   RUE VAG with good thrill     Non-palpable R radial pulse (pre-op 2+ R radial pulse)     Positive Davidson's test        R hand - motor/sensitive intact    LAB RESULTS:  Lab Results   Component Value Date    K 4.0 08/31/2018    K 4.6 08/25/2018    K 4.7 08/17/2018    CREATININE 3.9 (H) 08/31/2018    CREATININE 3.1 (H) 08/25/2018    CREATININE 4.0 (H) 08/17/2018     Lab Results   Component Value Date    WBC 7.62 08/31/2018    WBC 7.18 08/25/2018    WBC 6.06 08/17/2018    HCT 28.2 (L) 08/31/2018    HCT 27.8 (L) 08/25/2018    HCT 22.3 (L) 08/17/2018     08/31/2018     08/25/2018     08/17/2018     Lab Results   Component Value Date    HGBA1C 5.7 (H) 08/31/2018    HGBA1C 5.6 08/10/2018    HGBA1C 5.8 (H) 04/25/2018     IMAGING:  AVG u/s: flow [id number] ml/min; no stenosis    Previous:  Vein mapping:  Right- cephalic medial forearm 4.1, basilic antecubital 6.3  Left- cpehalic vein 2.0, antecubital fossa 3.6    IMP/PLAN:  58 y.o. female with a history of HTN, HLD, L hemiplegia after large stroke in 2016, DM II, stage V CKD since August 2018 - doing well s/p  creation of RUE AVG  Cont ASA 81 po qd  Start using RUE AVG and rtc in ~2-3 weeks for permacath removal    Meir Valencia MD FACS  Vascular/Endovascular Surgery

## 2018-11-02 ENCOUNTER — OUTPATIENT CASE MANAGEMENT (OUTPATIENT)
Dept: ADMINISTRATIVE | Facility: OTHER | Age: 58
End: 2018-11-02

## 2018-11-02 NOTE — PROGRESS NOTES
"Summary:  Spoke with pt regarding follow-up - Pt saw vascular surgeon yesterday -pt reports that AVG is healing well- pt  reports that the AVG will be used for the first time today for dialysis access- Pt c/o being constipated again and says the Ducolox her  gives doesn't work.  Pt  states that he plans to give her 2 Ducolox tablets after dialysis and work on it this week-end to get her "cleaned out"Pt reports that last week her temporary port clogged and that the unit had to call dialysis MD to unclog it - pt reports she is glad they can use the AVG today.  Pt's  denies any falls or SOB- Pt states that she stays on her renal diet & fluid restrictions - pt's  states that he is going to try to take her to his aunts birthday party but pt doesn't feel like doing much when constipated- also  states but she does like to go out to eat -      Interventions:  Encouraged pt and  to ask MD at dialysis to perhaps give her something else for the constipation  Discussed D/C next week if all is still good    Plan:  Follow-up next week on constipation issue  D/C next week    Todays OPCM Self-Management Care Plan was developed with the patients/caregivers input and was based on identified barriers from todays assessment.  Goals were written today with the patient/caregiver and the patient has agreed to work towards these goals to improve his/her overall well-being. Patient verbalized understanding of the care plan, goals, and all of today's instructions. Encouraged patient/caregiver to communicate with his/her physician and health care team about health conditions and the treatment plan.  Provided my contact information today and encouraged patient/caregiver to call me with any questions as needed.  "

## 2018-11-08 ENCOUNTER — OUTPATIENT CASE MANAGEMENT (OUTPATIENT)
Dept: ADMINISTRATIVE | Facility: OTHER | Age: 58
End: 2018-11-08

## 2018-11-12 ENCOUNTER — TELEPHONE (OUTPATIENT)
Dept: INTERNAL MEDICINE | Facility: CLINIC | Age: 58
End: 2018-11-12

## 2018-11-12 NOTE — TELEPHONE ENCOUNTER
----- Message from Vickie Martinez sent at 11/12/2018 11:13 AM CST -----  Contact: [phone]/ Alannah/ daughter  Patient's daughter is requesting a johnany from the nurse in regards to the patient, she has a cold and has been spiting of mucus, but his morning it is a brownish color.    Please advise, thank you

## 2018-11-12 NOTE — TELEPHONE ENCOUNTER
Kristina with Reina (patient's daughter) advised that Dr. Max is not in the office on today and offered an UC appt. UC appt denied.

## 2018-11-13 ENCOUNTER — OFFICE VISIT (OUTPATIENT)
Dept: URGENT CARE | Facility: CLINIC | Age: 58
End: 2018-11-13
Payer: MEDICARE

## 2018-11-13 ENCOUNTER — OUTPATIENT CASE MANAGEMENT (OUTPATIENT)
Dept: ADMINISTRATIVE | Facility: OTHER | Age: 58
End: 2018-11-13

## 2018-11-13 VITALS
RESPIRATION RATE: 16 BRPM | SYSTOLIC BLOOD PRESSURE: 110 MMHG | BODY MASS INDEX: 31.18 KG/M2 | TEMPERATURE: 98 F | HEART RATE: 60 BPM | HEIGHT: 63 IN | WEIGHT: 176 LBS | DIASTOLIC BLOOD PRESSURE: 74 MMHG | OXYGEN SATURATION: 98 %

## 2018-11-13 DIAGNOSIS — B96.89 ACUTE BACTERIAL BRONCHITIS: Primary | ICD-10-CM

## 2018-11-13 DIAGNOSIS — J20.8 ACUTE BACTERIAL BRONCHITIS: Primary | ICD-10-CM

## 2018-11-13 DIAGNOSIS — J34.89 SINUS PRESSURE: ICD-10-CM

## 2018-11-13 DIAGNOSIS — Z99.2 ESRD (END STAGE RENAL DISEASE) ON DIALYSIS: ICD-10-CM

## 2018-11-13 DIAGNOSIS — N18.6 ESRD (END STAGE RENAL DISEASE) ON DIALYSIS: ICD-10-CM

## 2018-11-13 PROCEDURE — 96372 THER/PROPH/DIAG INJ SC/IM: CPT | Mod: S$GLB,,, | Performed by: EMERGENCY MEDICINE

## 2018-11-13 PROCEDURE — 3008F BODY MASS INDEX DOCD: CPT | Mod: CPTII,S$GLB,, | Performed by: EMERGENCY MEDICINE

## 2018-11-13 PROCEDURE — 99214 OFFICE O/P EST MOD 30 MIN: CPT | Mod: 25,S$GLB,, | Performed by: EMERGENCY MEDICINE

## 2018-11-13 RX ORDER — LEVOFLOXACIN 250 MG/1
250 TABLET ORAL DAILY
Qty: 7 TABLET | Refills: 0 | Status: SHIPPED | OUTPATIENT
Start: 2018-11-13 | End: 2018-11-20

## 2018-11-13 RX ORDER — BENZONATATE 100 MG/1
100 CAPSULE ORAL 3 TIMES DAILY PRN
Qty: 21 CAPSULE | Refills: 0 | Status: SHIPPED | OUTPATIENT
Start: 2018-11-13 | End: 2018-11-20

## 2018-11-13 RX ORDER — CEFTRIAXONE 500 MG/1
500 INJECTION, POWDER, FOR SOLUTION INTRAMUSCULAR; INTRAVENOUS
Status: COMPLETED | OUTPATIENT
Start: 2018-11-13 | End: 2018-11-13

## 2018-11-13 RX ADMIN — CEFTRIAXONE 500 MG: 500 INJECTION, POWDER, FOR SOLUTION INTRAMUSCULAR; INTRAVENOUS at 02:11

## 2018-11-13 NOTE — PROGRESS NOTES
"Subjective:       Patient ID: Lucy Perez is a 58 y.o. female.    Vitals:    11/13/18 1425   BP: 110/74   Pulse: 60   Resp: 16   Temp: 97.9 °F (36.6 °C)   SpO2: 98%   Weight: 79.8 kg (176 lb)   Height: 5' 3" (1.6 m)       Chief Complaint: Cough    Patient reports productive cough for 2 weeks and sinus congestion, productive sputum of yellow/green sputum and nasal discharge wheen sneezing, and using her inhalers occasionally. Also reports sneezing and once last week had fevers. Not reporting fevers, joint pain, body aches. Is here with family member. She goes to dialysis [facility] and is scheduled tomorrow for dialysis.      Cough   This is a new problem. Episode onset: 2 weeks. The problem has been unchanged. The problem occurs hourly. The cough is productive of brown sputum. Associated symptoms include ear pain (right) and nasal congestion. Pertinent negatives include no chest pain, chills, eye redness, fever, headaches, myalgias, postnasal drip, sore throat, shortness of breath or wheezing. She has tried nothing for the symptoms. Her past medical history is significant for bronchitis.     Review of Systems   Constitution: Negative for chills, fever and malaise/fatigue.   HENT: Positive for congestion (nasal and chest) and ear pain (right). Negative for hoarse voice, postnasal drip and sore throat.    Eyes: Negative for discharge and redness.   Cardiovascular: Negative for chest pain, dyspnea on exertion and leg swelling.   Respiratory: Positive for cough and sputum production (brown). Negative for shortness of breath and wheezing.    Musculoskeletal: Negative for myalgias.   Gastrointestinal: Negative for abdominal pain and nausea.   Neurological: Negative for headaches.       Objective:      Physical Exam   Constitutional: She is oriented to person, place, and time. She appears well-developed and well-nourished. She is cooperative.  Non-toxic appearance. She does not appear ill. No distress.   Pleasant, " in wheelchair, chronic debilitation however at baseline   HENT:   Head: Normocephalic and atraumatic.   Right Ear: Hearing, tympanic membrane, external ear and ear canal normal.   Left Ear: Hearing, tympanic membrane, external ear and ear canal normal.   Nose: No mucosal edema, rhinorrhea or nasal deformity. No epistaxis. Right sinus exhibits no maxillary sinus tenderness and no frontal sinus tenderness. Left sinus exhibits no maxillary sinus tenderness and no frontal sinus tenderness.   Mouth/Throat: Uvula is midline, oropharynx is clear and moist and mucous membranes are normal. No trismus in the jaw. Normal dentition. No uvula swelling. No posterior oropharyngeal erythema.   Nasal and sinus congestion  ttp maxillary and frontal sinuses   Eyes: Conjunctivae and lids are normal.   Sclera clear bilat   Neck: Trachea normal, normal range of motion, full passive range of motion without pain and phonation normal. Neck supple.   Cardiovascular: Normal rate, regular rhythm, normal heart sounds, intact distal pulses and normal pulses.   Pulmonary/Chest: Effort normal. No respiratory distress. She has no wheezes. She has no rales. She exhibits no tenderness.   Occasional cough, no wheezing noted and coarse bs cleared with cough, no rales   Abdominal: Soft. Normal appearance and bowel sounds are normal. There is no tenderness.   Musculoskeletal: She exhibits tenderness. She exhibits no edema.   Neurological: She is alert and oriented to person, place, and time. She exhibits abnormal muscle tone (spasticity left s/p cva (hemorrhagic in past)). Coordination normal.   Chronic left sided hemiparesis   Skin: Skin is warm, dry and intact. She is not diaphoretic. No pallor.   Psychiatric: She has a normal mood and affect. Her speech is normal and behavior is normal. Cognition and memory are normal.   Nursing note and vitals reviewed.      Assessment:       1. Acute bacterial bronchitis    2. Sinus pressure    3. ESRD (end stage  renal disease) on dialysis        Plan:       Lucy was seen today for cough.    Diagnoses and all orders for this visit:    Acute bacterial bronchitis    Sinus pressure    ESRD (end stage renal disease) on dialysis    Other orders  -     cefTRIAXone injection 500 mg  -     levoFLOXacin (LEVAQUIN) 250 MG tablet; Take 1 tablet (250 mg total) by mouth once daily. for 7 days  -     benzonatate (TESSALON PERLES) 100 MG capsule; Take 1 capsule (100 mg total) by mouth 3 (three) times daily as needed for Cough.          Patient Instructions   BE CERTAIN TO FOLLOW UP WITH YOUR PRIMARY CARE DOCTORS  ROCEPHIN 500 MG GIVEN TO YOU IN CLINIC  LEVAQUIN 250 MG RX FOR 7 DAYS  DO NOT MISS DIALYSIS AND BE SURE TO GO MWF AS PLANNED  TESSALON PERLES RX FOR COUGH  MUCINEX OR OTC GUAIFENESIN FOR COUGH/CHEST CONGESTION  GO TO THE ER FOR SHORTNESS OF BREATH, CHEST PAINS OF ANY SORT, OR HIGH FEVERS DESPITE TREATMENT OR IF WORSE IN ANY WAY  USE YOUR ALBUTEROL NEBULIZER TREATMENT EVERY 4-6 HOURS FOR WHEEZING/TIGHTNESS IN CHEST    SEE BRONCHITIS SHEET  GO TO THE ER IF WORSE IN ANY WAY  FOLLOW UP WITH YOUR PCP IN 48 HOURS IF NOT IMPROVED.  Bronchitis, Antibiotic Treatment (Adult)    Bronchitis is an infection of the air passages (bronchial tubes) in your lungs. It often occurs when you have a cold. This illness is contagious during the first few days and is spread through the air by coughing and sneezing, or by direct contact (touching the sick person and then touching your own eyes, nose, or mouth).  Symptoms of bronchitis include cough with mucus (phlegm) and low-grade fever. Bronchitis usually lasts 7 to 14 days. Mild cases can be treated with simple home remedies. More severe infection is treated with an antibiotic.  Home care  Follow these guidelines when caring for yourself at home:  · If your symptoms are severe, rest at home for the first 2 to 3 days. When you go back to your usual activities, don't let yourself get too tired.  · Do not  smoke. Also avoid being exposed to secondhand smoke.  · You may use over-the-counter medicines to control fever or pain, unless another medicine was prescribed. (Note: If you have chronic liver or kidney disease or have ever had a stomach ulcer or gastrointestinal bleeding, talk with your healthcare provider before using these medicines. Also talk to your provider if you are taking medicine to prevent blood clots.) Aspirin should never be given to anyone younger than 18 years of age who is ill with a viral infection or fever. It may cause severe liver or brain damage.  · Your appetite may be poor, so a light diet is fine. Avoid dehydration by drinking 6 to 8 glasses of fluids per day (such as water, soft drinks, sports drinks, juices, tea, or soup). Extra fluids will help loosen secretions in the nose and lungs.  · Over-the-counter cough, cold, and sore-throat medicines will not shorten the length of the illness, but they may be helpful to reduce symptoms. (Note: Do not use decongestants if you have high blood pressure.)  · Finish all antibiotic medicine. Do this even if you are feeling better after only a few days.  Follow-up care  Follow up with your healthcare provider, or as advised. If you had an X-ray or ECG (electrocardiogram), a specialist will review it. You will be notified of any new findings that may affect your care.  Note: If you are age 65 or older, or if you have a chronic lung disease or condition that affects your immune system, or you smoke, talk to your healthcare provider about having pneumococcal vaccinations and a yearly influenza vaccination (flu shot).  When to seek medical advice  Call your healthcare provider right away if any of these occur:  · Fever of 100.4°F (38°C) or higher  · Coughing up increased amounts of colored sputum  · Weakness, drowsiness, headache, facial pain, ear pain, or a stiff neck  Call 911, or get immediate medical care  Contact emergency services right away if any of  these occur.  · Coughing up blood  · Worsening weakness, drowsiness, headache, or stiff neck  · Trouble breathing, wheezing, or pain with breathing  Date Last Reviewed: 9/13/2015  © 4500-9964 "[employer]. [address] [place] [address], [place] [address]. All rights reserved. This information is not intended as a substitute for professional medical care. Always follow your healthcare professional's instructions.

## 2018-11-13 NOTE — PROGRESS NOTES
Summary:  LCSW contacted pt for follow up. Pt stated she is continuing to cough up mucus with a brownish tint. Pt stated she would like to see a provider but they are having transportation problems at this time. There isn't anyone at the house right now with a working vehicle and her daughter is at work. LCSW spoke with pt's  and stated he will plan to bring her to the UC on [address] as soon as he can. No other concerns were voiced at this time.     Interventions:  Encouraged communication with provider  Provided contact information for UC nearest to home    Plan:  Re-assess for further needs  If no other needs, case closure    Todays OPCM Self-Management Care Plan was developed with the patients/caregivers input and was based on identified barriers from todays assessment.  Goals were written today with the patient/caregiver and the patient has agreed to work towards these goals to improve his/her overall well-being. Patient verbalized understanding of the care plan, goals, and all of today's instructions. Encouraged patient/caregiver to communicate with his/her physician and health care team about health conditions and the treatment plan.  Provided my contact information today and encouraged patient/caregiver to call me with any questions as needed.

## 2018-11-13 NOTE — PATIENT INSTRUCTIONS
BE CERTAIN TO FOLLOW UP WITH YOUR PRIMARY CARE DOCTORS  ROCEPHIN 500 MG GIVEN TO YOU IN CLINIC  LEVAQUIN 250 MG RX FOR 7 DAYS  DO NOT MISS DIALYSIS AND BE SURE TO GO MWF AS PLANNED  ALEX IVY RX FOR COUGH  MUCINEX OR OTC GUAIFENESIN FOR COUGH/CHEST CONGESTION  GO TO THE ER FOR SHORTNESS OF BREATH, CHEST PAINS OF ANY SORT, OR HIGH FEVERS DESPITE TREATMENT OR IF WORSE IN ANY WAY  USE YOUR ALBUTEROL NEBULIZER TREATMENT EVERY 4-6 HOURS FOR WHEEZING/TIGHTNESS IN CHEST    SEE BRONCHITIS SHEET  GO TO THE ER IF WORSE IN ANY WAY  FOLLOW UP WITH YOUR PCP IN 48 HOURS IF NOT IMPROVED.  Bronchitis, Antibiotic Treatment (Adult)    Bronchitis is an infection of the air passages (bronchial tubes) in your lungs. It often occurs when you have a cold. This illness is contagious during the first few days and is spread through the air by coughing and sneezing, or by direct contact (touching the sick person and then touching your own eyes, nose, or mouth).  Symptoms of bronchitis include cough with mucus (phlegm) and low-grade fever. Bronchitis usually lasts 7 to 14 days. Mild cases can be treated with simple home remedies. More severe infection is treated with an antibiotic.  Home care  Follow these guidelines when caring for yourself at home:  · If your symptoms are severe, rest at home for the first 2 to 3 days. When you go back to your usual activities, don't let yourself get too tired.  · Do not smoke. Also avoid being exposed to secondhand smoke.  · You may use over-the-counter medicines to control fever or pain, unless another medicine was prescribed. (Note: If you have chronic liver or kidney disease or have ever had a stomach ulcer or gastrointestinal bleeding, talk with your healthcare provider before using these medicines. Also talk to your provider if you are taking medicine to prevent blood clots.) Aspirin should never be given to anyone younger than 18 years of age who is ill with a viral infection or fever. It may  cause severe liver or brain damage.  · Your appetite may be poor, so a light diet is fine. Avoid dehydration by drinking 6 to 8 glasses of fluids per day (such as water, soft drinks, sports drinks, juices, tea, or soup). Extra fluids will help loosen secretions in the nose and lungs.  · Over-the-counter cough, cold, and sore-throat medicines will not shorten the length of the illness, but they may be helpful to reduce symptoms. (Note: Do not use decongestants if you have high blood pressure.)  · Finish all antibiotic medicine. Do this even if you are feeling better after only a few days.  Follow-up care  Follow up with your healthcare provider, or as advised. If you had an X-ray or ECG (electrocardiogram), a specialist will review it. You will be notified of any new findings that may affect your care.  Note: If you are age 65 or older, or if you have a chronic lung disease or condition that affects your immune system, or you smoke, talk to your healthcare provider about having pneumococcal vaccinations and a yearly influenza vaccination (flu shot).  When to seek medical advice  Call your healthcare provider right away if any of these occur:  · Fever of 100.4°F (38°C) or higher  · Coughing up increased amounts of colored sputum  · Weakness, drowsiness, headache, facial pain, ear pain, or a stiff neck  Call 911, or get immediate medical care  Contact emergency services right away if any of these occur.  · Coughing up blood  · Worsening weakness, drowsiness, headache, or stiff neck  · Trouble breathing, wheezing, or pain with breathing  Date Last Reviewed: 9/13/2015 © 2000-2017 [employer]. [address] [place] [address], [place] [address]. All rights reserved. This information is not intended as a substitute for professional medical care. Always follow your healthcare professional's instructions.

## 2018-11-15 ENCOUNTER — OUTPATIENT CASE MANAGEMENT (OUTPATIENT)
Dept: ADMINISTRATIVE | Facility: OTHER | Age: 58
End: 2018-11-15

## 2018-11-15 NOTE — PROGRESS NOTES
"Summary:  Spoke with pt aaliyah then was on speaker phone with pt's daughter and  -spoke with regarding Urgent care visit - pt daughter reports pt has bronchitis - daughter states they were able to  medications yesterday -Daughter stated they were glad that they caught it early.  Pt's daughter and  reported that pt's blood sugars have been running in the 140's.  Daughter also reports that lately pt "breaks a sweat" every night between 6 and 7 pm but does not have a fever Pt's daughter did report that sputum was getting clearer but still had a brown tinge.      Interventions:  Reviewed Ptdischarge Instructions with daughter from Urgent care  Encourage family to make appt for pt with PCP after finishing the antibiotics to be checked  Instructed pt's daughter to either go to ED or call [facility] should pt run a high fever, become SOB, develop chest pains or begin coughing up blood  Instructed daughter to insure pt did not miss dialysis as scheduled  Encourage pt's daughter and  to report the "sweats" to PCP if it continues at next visit - which should be follow-up post bronchitis  Encourage pt, daughter and  to call OPCM RN for any questions on instructions, etc  Mailed education info and care regarding bronchitis - Yash      Plan:  Follow-up next week post bronchitis  Follow-up on blood sugars - 11-20    Todays OPCM Self-Management Care Plan was developed with the patients/caregivers input and was based on identified barriers from todays assessment.  Goals were written today with the patient/caregiver and the patient has agreed to work towards these goals to improve his/her overall well-being. Patient verbalized understanding of the care plan, goals, and all of today's instructions. Encouraged patient/caregiver to communicate with his/her physician and health care team about health conditions and the treatment plan.  Provided my contact information today and encouraged " patient/caregiver to call me with any questions as needed.

## 2018-11-15 NOTE — PATIENT INSTRUCTIONS
What Is Acute Bronchitis?  Acute bronchitis is when the airways in your lungs (bronchial tubes) become red and swollen (inflamed). It is usually caused by a viral infection. But it can also occur because of a bacteria or allergen. Symptoms include a cough that produces yellow or greenish mucus and can last for days or sometimes weeks.  Inside healthy lungs    Air travels in and out of the lungs through the airways. The linings of these airways produce sticky mucus. This mucus traps particles that enter the lungs. Tiny structures called cilia then sweep the particles out of the airways.     Healthy airway: Airways are normally open. Air moves in and out easily.      Healthy cilia: Tiny, hairlike cilia sweep mucus and particles up and out of the airways.   Lungs with bronchitis  Bronchitis often occurs with a cold or the flu virus. The airways become inflamed (red and swollen). There is a deep hacking cough from the extra mucus. Other symptoms may include:  · Wheezing  · Chest discomfort  · Shortness of breath  · Mild fever  A second infection, this time due to bacteria, may then occur. And airways irritated by allergens or smoke are more likely to get infected.        Inflamed airway: Inflammation and extra mucus narrow the airway, causing shortness of breath.      Impaired cilia: Extra mucus impairs cilia, causing congestion and wheezing. Smoking makes the problem worse.   Making a diagnosis  A physical exam, health history, and certain tests help your healthcare provider make the diagnosis.  Health history  Your healthcare provider will ask you about your symptoms.  The exam  Your provider listens to your chest for signs of congestion. He or she may also check your ears, nose, and throat.  Possible tests  · A sputum test for bacteria. This requires a sample of mucus from your lungs.  · A nasal or throat swab. This tests to see if you have a bacterial infection.  · A chest X-ray. This is done if your healthcare  provider thinks you have pneumonia.  · Tests to check for an underlying condition. Other tests may be done to check for things such as allergies, asthma, or COPD (chronic obstructive pulmonary disease). You may need to see a specialist for more lung function testing.  Treating a cough  The main treatment for bronchitis is easing symptoms. Avoiding smoke, allergens, and other things that trigger coughing can often help. If the infection is bacterial, you may be given antibiotics. During the illness, it's important to get plenty of sleep. To ease symptoms:  · Dont smoke. Also avoid secondhand smoke.  · Use a humidifier. Or try breathing in steam from a hot shower. This may help loosen mucus.  · Drink a lot of water and juice. They can soothe the throat and may help thin mucus.  · Sit up or use extra pillows when in bed. This helps to lessen coughing and congestion.  · Ask your provider about using medicine. Ask about using cough medicine, pain and fever medicine, or a decongestant.  Antibiotics  Most cases of bronchitis are caused by cold or flu viruses. They dont need antibiotics to treat them, even if your mucus is thick and green or yellow. Antibiotics dont treat viral illness and antibiotics have not been shown to have any benefit in cases of acute bronchitis. Taking antibiotics when they are not needed increases your risk of getting an infection later that is antibiotic-resistant. Antibiotics can also cause severe cases of diarrhea that require other antibiotics to treat.  It is important that you accept your healthcare provider's opinion to not use antibiotics. Your provider will prescribe antibiotics if the infection is caused by bacteria. If they are prescribed:  · Take all of the medicine. Take the medicine until it is used up, even if symptoms have improved. If you dont, the bronchitis may come back.  · Take the medicines as directed. For instance, some medicines should be taken with food.  · Ask about  side effects. Ask your provider or pharmacist what side effects are common, and what to do about them.  Follow-up care  You should see your provider again in 2 to 3 weeks. By this time, symptoms should have improved. An infection that lasts longer may mean you have a more serious problem.  Prevention  · Avoid tobacco smoke. If you smoke, quit. Stay away from smoky places. Ask friends and family not to smoke around you, or in your home or car.  · Get checked for allergies.  · Ask your provider about getting a yearly flu shot. Also ask about pneumococcal or pneumonia shots.  · Wash your hands often. This helps reduce the chance of picking up viruses that cause colds and flu.  Call your healthcare provider if:  · Symptoms worsen, or you have new symptoms  · Breathing problems worsen or  become severe  · Symptoms dont get better within a week, or within 3 days of taking antibiotics   Date Last Reviewed: 2/1/2017  © 6613-8140 [employer]. [address] [place] [address], [place] [address]. All rights reserved. This information is not intended as a substitute for professional medical care. Always follow your healthcare professional's instructions.

## 2018-11-15 NOTE — PROGRESS NOTES
Summary:  1st Attempt to complete SW follow-up for Outpatient Care Management; left message requesting return call.  LCSW will reattempt at a later date.      Interventions:  None    Plan:  Re-assess for further needs  If no other needs, case closure

## 2018-11-20 ENCOUNTER — OUTPATIENT CASE MANAGEMENT (OUTPATIENT)
Dept: ADMINISTRATIVE | Facility: OTHER | Age: 58
End: 2018-11-20

## 2018-11-20 NOTE — LETTER
November 20, 2018    Lucy Perez  [address] [facility] [address]             [facility]  [address] gina  [facility] [address] Dear Mrs. Perez:    I am writing from the Outpatient Complex Care Management Department at [facility].  I have been unsuccessful at reaching you to follow up with you to see how you have been doing. I hope you find the resources previously provided to you helpful. Please contact me for further assistance.    I can be reached at [phone] Monday thru Friday from 8:00am to 4:30pm.  [facility] also has a program with a nurse available 24/7 to answer questions or provide medical advice.  [facility] can be reached at [phone].    Sincerely,       Bebe Blue LCSW

## 2018-11-20 NOTE — PROGRESS NOTES
Patient Information     Patient Name MRN Karen Randle [id number] Female 1948      Progress Notes by Jazzy Taylor MD at 3/14/2017  8:50 AM     Author:  Jazzy Taylor MD Service:  (none) Author Type:  Physician     Filed:  3/14/2017 10:37 AM Encounter Date:  3/14/2017 Status:  Signed     :  Jazzy Taylor MD (Physician)            Primary Care Physician: Hugo Duarte MD    HPI:   Patient is here for follow up. The patient is 68 y.o. female with chronic stage 4 pressure ulcer of the left buttock. She was recently hospitalized with sepsis-thought to be urinary source. Her VAC dressing was removed at that time as her wound had excess exudate and drainage. She is back at [facility] now. She is feeling better. The wound has been getting wet to dry dressing changes. She hasn't had any increase in drainage or pain. No fever. She is getting stronger.     ASSESSMENT AND PLAN/RECOMMENDATIONS:   Chronic stage 4 pressure ulcer of the left buttock to sacrum. VAC dressing applied today-see procedure note. No medication changes. Encouraged her to off load pressure on the left buttock and to improve nutrition to help with healing. Will be difficult due to steroid dependence. The pump for her dressing is at . Ordered to be hooked up when she gets back with 125 pressure. She will see Dr. Duarte as scheduled on Thursday. She hopes to get discharged from  and return to her home with home care for wound care. She should follow up with [facility] for ongoing wound checks. See me if worsening or if concerns. She denies questions at this time.     PAST MEDICAL HISTORY  Past Medical History      Diagnosis   Date     Atrial fibrillation (HC)       Diabetes mellitus, type II (HC)       Elevated liver function tests  2010     Fatty liver on US and CT.  Iron nl. Hep C neg      Epidural abscess       H/O cardiovascular stress test       stress cardiolyte neg; EF 65%      Hyperlipidemia        Summary:  1st missed follow-up attempt    Interventions:  Left message regarding purpose of the follow-up call - how was pt recovering from the bronchitis?  Was pt better?   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      Psoas muscle abscess (HC)  2016     Strep viridans      Rheumatoid arthritis(714.0)  2000     Urinary sepsis  2012     hospitalized with hypotension, blood cultures showed Escherichia coli       PAST SURGICAL HISTORY  Past Surgical History       Procedure   Laterality Date     Spine surgery        Back surgery with fusion of the lumbar discs       Jeb and bso        secondary to fibroids       Portacath        hx chest tube surgery x2       Cataract removal  Bilateral      Breast biopsy        Left       Knee replacement  Bilateral 07/10/08     Colonoscopy screening   2001     repeat in 10 years per patient       Colonoscopy screening   2010     sigmoid diverticulosis, no polyps, due 2020       Forearm/wrist surgery   2010     removed nodule Right wrist       Colonoscopy screening   2011     mild sigmoid diverticulosis, EGD biopsies       Portacath   2012     power port placement, Dr. Taylor       Esophagogastroduodenoscopy   2/2011     Normal       Ct guide perc drain cath (ia)   2016      CURRENT MEDS  Current Outpatient Prescriptions on File Prior to Visit       Medication  Sig Dispense Refill     ACCU-CHEK MULTICLIX LANCET USE ONE LANCET TO CHECK GLUCOSE ONCE OR TWICE DAILY 200 Each 1     acetaminophen SR (TYLENOL ARTHRITIS) 650 mg Extended-Release tablet Take 1,300 mg by mouth every 8 hours if needed. Max acetaminophen dose: 4000mg in 24 hrs.       amLODIPine (NORVASC) 2.5 mg tablet Take 1 tablet by mouth once daily. 90 tablet 3     ascorbic acid, vitamin C, (ASCORBIC ACID WITH NIECY HIPS) 500 mg tablet Take 1,000 mg by mouth once daily.       aspirin 81 mg tablet Take 1 tablet by mouth once daily with a meal.       atorvastatin (LIPITOR) 20 mg tablet TAKE ONE TABLET BY MOUTH AT BEDTIME 90  "tablet 2     baclofen (LIORESAL) 10 mg tablet Take 1 tablet by mouth 3 times daily. 100 tablet 0     BENADRYL 25 MG CAP two tablets orally at bedtime       blood sugar diagnostic (ACCU-CHEK LENIN PLUS TEST STRP) strip Test one to two times daily E11.9 100 Strip 6     blood sugar diagnostic (BLOOD GLUCOSE TEST) strip Dispense item covered by pt ins. Test 4 times daily E11.29 400 Each 1     CALCIUM CARBONATE (TUMS 500 ORAL) Take 2 Tabs by mouth 2 times daily if needed (HEARTBURN).       CALCIUM CARBONATE/VITAMIN D3 (CALCIUM + D ORAL) Take 2 tablets by mouth once daily.       cloNIDine HCl (CATAPRES) 0.1 mg tablet Take 1 tablet by mouth once daily. 90 tablet 2     docusate (COLACE) 100 mg capsule Take 100 mg by mouth 2 times daily if needed for Constipation.       estradiol (ESTRACE) 0.5 mg tablet Take 1 tablet by mouth once daily. 90 tablet 3     ferrous sulfate 325 mg delayed release tablet Take 650 mg by mouth once daily.       gabapentin (NEURONTIN) 100 mg capsule Take 1 capsule by mouth 3 times daily. 270 capsule 3     HYDROcodone-acetaminophen, 5-325 mg, (NORCO) per tablet Take 1 tablet by mouth every 6 hours if needed  for Pain Max acetaminophen dose: 4000 mg in 24 hrs. 40 tablet 0     insulin glargine (LANTUS SOLOSTAR) 100 unit/mL (3 mL) pen 16 units am, 14 units pm 1 box 0     Insulin Safety Needles, Disp, 30 gauge x 1/3\" For administering insulin at home 4 times daily.  Dx code e11.9 3 box 2     metFORMIN (GLUCOPHAGE XR) 500 mg Extended-Release tablet Take 2 tablets by mouth 2 times daily with meals. 360 tablet 2     NOVOLOG FLEXPEN 100 unit/mL solution for injection INJECT AS DIRECTED PER SS FOUR TIMES DAILY (MAX 32 UNITS DAILY)  99     nystatin (MYCOSTATIN) 100,000 unit/mL suspension Swish and swallow 5 mL by mouth 4 times daily for 10 days. 200 mL 0     Omega-3-DHA-EPA-Fish Oil 1,200 (144-216) mg capsule Take 1 capsule by mouth once daily.       omeprazole (PRILOSEC) 20 mg Delayed-Release capsule TAKE ONE " CAPSULE BY MOUTH ONCE DAILY BEFORE  A  MEAL 90 capsule 3     predniSONE (DELTASONE) 5 mg tablet TAKE ONE TABLET BY MOUTH TWICE DAILY WITH MEALS 60 tablet 5     psyllium (METAMUCIL) 0.52 gram capsule Take 3 capsules by mouth once daily.       Saccharomyces boulardii (FLORASTOR) 250 mg capsule Take 2 capsules by mouth 2 times daily. 60 capsule 0     sodium hypochlorite (1/2 STRENGTH DAKIN'S SOLUTION) 0.25% soln Apply  topically to affected area(s) 2 times daily.  0     spironolactone (ALDACTONE) 25 mg tablet TAKE ONE TABLET BY MOUTH ONCE DAILY 90 tablet 2     TAZTIA  mg capsule TAKE ONE CAPSULE BY MOUTH ONCE DAILY 90 capsule 0     traMADol (ULTRAM) 50 mg tablet Take 2 tablets by mouth 4 times daily if needed for Pain. 90 tablet 0     No current facility-administered medications on file prior to visit.      ALLERGIES/SENSITIVITIES  Allergies      Allergen   Reactions     Adhesive Tapes [Adhesive]  *Unknown     Paper tapes, fragile skin      Aliskiren  Cough     Lisinopril  Cough     Ace cough      Losartan  Cough     Paper Tape [Adhesive Tape]       Skin comes off      REVIEW OF SYSTEMS  GENERAL: No fevers or chills. Is getting stronger. Has had some weight loss.  HEENT: No sinus drainage. No changes with vision or hearing. No difficulty swallowing.   LYMPHATICS:  No swollen nodes in axilla, neck or groin.  CARDIOVASCULAR: Denies chest pain.  PULMONARY: No shortness of breath or cough. No increase in sputum production.  GI: Denies melena, bright red blood in stools. No hematemesis. No constipation or diarrhea-was constipated but on stool softener now.  : No dysuria or hematuria.  SKIN: No recent rashes.   ENDOCRINE: has diabetes, is on steroids  HEMATOLOGY:  No history of easy bruising or bleeding.  NEUROLOGY:  No history of seizures or headaches. No motor or sensory changes.    PHYSICAL EXAM  /70  Temp 98  F (36.7  C) (Tympanic)  Wt 49.4 kg (109 lb)  BMI 19.31 kg/m2  GENERAL: elderly patient in no acute  distress. Pleasant and cooperative with exam and interview.   HEENT: Head-normocephalic. Eyes-no scleral icterus, pupils equal, round, and reactive to light. Nose-no nasal drainage. No lesions. Mouth-oral mucosa pink and moist, no lesions.  NECK: Supple. No thyroid nodules. Trachea midline..  SKIN: Pink, warm and dry. No jaundice. No rash. Left buttock with 7 x 4 x 5 cm tunneling wound, rough sacral bone palpable at base. No exudate and only minimal drainage.  NEURO:  Cranial nerves II-XII grossly intact. Alert and oriented.  PSYCH: Appropriate mood and affect.    Jazzy Taylor MD

## 2018-11-20 NOTE — PROGRESS NOTES
Summary:  2nd Attempt to complete SW follow-up for Outpatient Care Management; left message requesting a return call and LCSW mailed a letter with contact information requesting a return call.  OPCM RN notified.    Interventions:  Collaborated with OPCM-RN    Plan:  Re-assess for further needs  If no other needs, case closure

## 2018-11-27 ENCOUNTER — PATIENT MESSAGE (OUTPATIENT)
Dept: ADMINISTRATIVE | Facility: OTHER | Age: 58
End: 2018-11-27

## 2018-11-27 ENCOUNTER — OUTPATIENT CASE MANAGEMENT (OUTPATIENT)
Dept: ADMINISTRATIVE | Facility: OTHER | Age: 58
End: 2018-11-27

## 2018-11-27 NOTE — LETTER
November 27, 2018    Lucy Perez  [address] [facility] [address]             [facility]  [address]arrin Britt  [facility] [address] Dear Ms Perez,    I work with [facility]'s Outpatient Case Management Department. I have been unsuccessful at reaching you to follow-up to see how you have been doing. Please call me back at your earliest convenience to discuss your health care needs.      I can be reached at [phone] from 8:00AM to 4:30 PM on Monday thru Friday. [facility] also has a program where a nurse is available 24/7 to answer questions or provide medical advice, their number is [phone].    Thanks,        Conchita ([place]) Dillon MUHAMMAD BSN RN  Outpatient Case Management

## 2018-11-27 NOTE — PROGRESS NOTES
Summary:  2nd missed follow-up attempt    Interventions:  Left message regarding purpose of the call and follow-up on bronchitis - hoping it is gone - requested a return call  Sent letter via pt portal    Plan:  Follow-up next week if no return call

## 2018-11-29 ENCOUNTER — OFFICE VISIT (OUTPATIENT)
Dept: VASCULAR SURGERY | Facility: CLINIC | Age: 58
End: 2018-11-29
Payer: MEDICARE

## 2018-11-29 ENCOUNTER — TELEPHONE (OUTPATIENT)
Dept: INTERNAL MEDICINE | Facility: CLINIC | Age: 58
End: 2018-11-29

## 2018-11-29 ENCOUNTER — OUTPATIENT CASE MANAGEMENT (OUTPATIENT)
Dept: ADMINISTRATIVE | Facility: OTHER | Age: 58
End: 2018-11-29

## 2018-11-29 VITALS
BODY MASS INDEX: 29.59 KG/M2 | HEIGHT: 63 IN | WEIGHT: 167 LBS | TEMPERATURE: 99 F | SYSTOLIC BLOOD PRESSURE: 135 MMHG | DIASTOLIC BLOOD PRESSURE: 84 MMHG | HEART RATE: 76 BPM

## 2018-11-29 DIAGNOSIS — Z99.2 ESRD (END STAGE RENAL DISEASE) ON DIALYSIS: Primary | ICD-10-CM

## 2018-11-29 DIAGNOSIS — N18.6 ESRD (END STAGE RENAL DISEASE): Primary | ICD-10-CM

## 2018-11-29 DIAGNOSIS — N18.6 ESRD (END STAGE RENAL DISEASE) ON DIALYSIS: Primary | ICD-10-CM

## 2018-11-29 PROCEDURE — 99999 PR PBB SHADOW E&M-EST. PATIENT-LVL III: CPT | Mod: PBBFAC,HCNC,, | Performed by: SURGERY

## 2018-11-29 PROCEDURE — 36589 REMOVAL TUNNELED CV CATH: CPT | Mod: 58,HCNC,S$GLB, | Performed by: SURGERY

## 2018-11-29 PROCEDURE — 3008F BODY MASS INDEX DOCD: CPT | Mod: CPTII,HCNC,S$GLB, | Performed by: SURGERY

## 2018-11-29 RX ORDER — LIDOCAINE AND PRILOCAINE 25; 25 MG/G; MG/G
CREAM TOPICAL
Qty: 30 G | Refills: 1 | Status: ON HOLD | OUTPATIENT
Start: 2018-11-29 | End: 2019-10-08 | Stop reason: HOSPADM

## 2018-11-29 NOTE — PATIENT INSTRUCTIONS
Creating a Hemodialysis Access    Before hemodialysis can be done, a way for blood to leave and return to your body (an access) is needed. During hemodialysis, needles placed into the access carry blood to and from the dialyzer. A hemodialysis access is usually created in your arm. The 2 main types of accesses are an arteriovenous fistula (AV fistula) and an arteriovenous graft (AV graft).  Creating your access  The hemodialysis access provides a large volume of rapidly flowing blood. It involves a surgical operation under anesthesia. You may be able to go home the same day. It is made during a short procedure using one of these methods:  · A fistula is made by connecting an artery to a nearby vein. The high pressure and blood flow in the artery are transferred into the vein and help it grow in size and thickness. This enlarged vein (fistula) eventually has high blood flow and is thick enough for needles to be placed safely several times each week during hemodialysis. It may need weeks or months to develop before it's ready to be used. A fistula is more efficient than the graft and has fewer long-term problems. Therefore, it is the preferred form of access.  · A graft (piece of synthetic tube) may be sewn between an artery and a vein if a fistula is not possible because of the small size of your veins. Blood flows rapidly through the graft from the artery to the vein. A graft is usually ready to use in a few weeks. Needles can be placed into the plastic tube to obtain blood during dialysis.  Both types of access may take weeks to months before they can be used. If dialysis is necessary immediately, a temporary venous catheter is used. A catheter that allows two way blood flow is placed into a large vein and the dialysis tubing is connected to the catheter. If both the AV fistula and graft are unsuccessful, a more permanent venous catheter is used.  The most common complications for hemodialysis access are  infection, clotting and decreased blood flow from clotting or other narrowing.  Date Last Reviewed: 1/1/2017  © 1193-5661 [employer]. [address] [place] [address], [place] [address]. All rights reserved. This information is not intended as a substitute for professional medical care. Always follow your healthcare professional's instructions.

## 2018-11-29 NOTE — PROGRESS NOTES
Summary:  LCSW contacted pt for follow up. Pt stated she is doing fair. Pt reports a lot of pain in her back and neck and has an appointment today with [facility]. Pt asked for a one-handed walker and desires therapy again. However, she has not seen PMR in a while. LCSW placed a call and scheduled an appointment with Dr. Pardo for 12/6 at 1:40pm. Pt and her family were informed of the appointment.       Interventions:  Encouraged family involvement in care  Scheduled an appointment for PMR  Collaborated with PCP regarding weakness and desires for one-hand walker.  Collaborated with OPCM-RN    Plan:  Re-assess for further needs  Case closure      Todays OPCM Self-Management Care Plan was developed with the patients/caregivers input and was based on identified barriers from todays assessment.  Goals were written today with the patient/caregiver and the patient has agreed to work towards these goals to improve his/her overall well-being. Patient verbalized understanding of the care plan, goals, and all of today's instructions. Encouraged patient/caregiver to communicate with his/her physician and health care team about health conditions and the treatment plan.  Provided my contact information today and encouraged patient/caregiver to call me with any questions as needed.

## 2018-11-29 NOTE — PROGRESS NOTES
Lucy Aubrey Perez  11/29/2018    HPI:   Patient is a 58 y.o. female with a history of HTN, HLD, L hemiplegia after large stroke in 2016, DM II, stage V CKD since August 2018 MWF for f/u    She has a residual impairment on her left upper extremity and left lower extremity from her stroke and is currently wheel chair bound. She has complete function of her right arm currently. She is right handed. No history of trauma to right arm.     Was seeing Dr. Blank for nephology but he no longer works at [employer].     s/p  10/18/18  RUE AVG creation, gore interring 4-7mm tapered brachial artery/vein    Findings/Key Components:  Fair thrill palpable throughout AVG  SBP 90s - 100s dev-op  2+ R radial pulse; minimal augmentation from strong biphasic to triphasic in R radial and ulnar doppler signals with AVG compression  Hold nifedipine post-op    Retired  at [employer].     No MI, but history of stroke in 2016 (hemorragic)   Tobacco use: never use    Renal is Dr LING Perez    Past Medical History:   Diagnosis Date    Anemia of chronic disease 6/10/2017    Anxiety     Arthritis of right acromioclavicular joint 7/2/2014    Asthma     Bipolar disorder     Bronchitis, acute     Cataract     Cholelithiasis     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    Cortical cataract of both eyes 7/26/2016    Degeneration of lumbar or lumbosacral intervertebral disc 3/5/2013    S/p MRI L-spine 5/2009     Depression     ESRD on hemodialysis     Started August 2018    General anesthetics causing adverse effect in therapeutic use     Hemorrhagic cerebrovascular accident (CVA)     8/2016 s/p Hemicraniotomy at [facility] with Left hemiparesis    Hemorrhagic cerebrovascular accident (CVA) 1/3/2018    History of stroke 6/28/2017    s/p R-MCA stroke with R-putaminal hemorrhagic transformation in 8/2016 and 11/2016 (s/p hemicraniotomy at [facility]) with residual L hemiparesis, on AED s/p CVA       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    Hyperlipidemia     Hypertensive retinopathy of both eyes 7/26/2016    Impingement syndrome of right shoulder 7/2/2014    Left ventricular diastolic dysfunction with preserved systolic function 12/15/2015    Mixed anxiety and depressive disorder 3/5/2013    Obesity     THERESA (obstructive sleep apnea) 3/5/2013    No Home CPAP 2ndary to cost     Partial symptomatic epilepsy with complex partial seizures, not intractable, without status epilepticus     PEG (percutaneous endoscopic gastrostomy) status 6/28/2017    Renovascular hypertension 3/5/2013    Will resume home medications: amlodipine, labetalol, and hydralazine Will hold Lisinopril in setting of DARLING.   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 without macular edema, without long-term current use of insulin 8/2/2017    Type 2 diabetes mellitus with diabetic polyneuropathy, without long-term current use of insulin 10/22/2015    Type 2 diabetes mellitus with stage 3 chronic kidney disease, without long-term current use of insulin 10/22/2015    Vertebral artery stenosis 3/5/2013    S/p Stenting per Dr Burnett      Past Surgical History:   Procedure Laterality Date    ARTHROSCOPY-SHOULDER WITH SUBACROMIAL DECOMPRESSION Right 7/9/2014    [facility]    AV GRAFT CREATION Right 10/18/2018    [facility]ir [facility] 2ND F[facility]    Biceps Tenotomy Right 7/9/2014    [facility] at [facility] OR    BREAST SURGERY      breast reduction    COLONOSCOPY N/A 8/11/2016    Procedure: COLONOSCOPY;  Surgeon: Jerry Vilchis MD;  Location:  "[facility] ENDO (4TH FLR);  Service: Endoscopy;  Laterality: N/A;  Patient reports difficulty awaking from anesthesia in the past.    COLONOSCOPY N/A 8/11/2016    Performed by Jerry [facility]is [facility] at [facility] ENDO (4TH FLR)    DEBRIDEMENT-SHOULDER-ARTHROSCOPIC Labral Cuff Right 7/9/2014    [facility] OR    ESOPHAGOGASTRODUODENOSCOPY (EGD) N/A 12/6/2017    [facility] by Dallas Lock Jr., [facility]    PKULCKNY-QVWOBEFB-KMYGYX END Right 7/9/2014    [facility] OR    HYSTERECTOMY      PLACEMENT OF ARTERIOVENOUS GRAFT Right 10/18/2018    Procedure: AV GRAFT CREATION;  Surgeon: Meir Valencia MD;  Location: [facility] OR 2ND FLR;  Service: Cardiovascular;  Laterality: Right;    ROTATOR CUFF REPAIR Right July 9, 2014    right side    Skull surgery      Aneurysm    stent placed      in vertebral artery    TOTAL REDUCTION MAMMOPLASTY      TUBAL LIGATION       Family History   Problem Relation Age of Onset    Cancer Mother 55        Breast cancer    Diabetes Mother     Hypertension Mother     Heart disease Mother     Heart attack Mother     Breast cancer Mother     Stroke Sister     Hypertension Sister     Sleep apnea Sister     No Known Problems Daughter     Bell's palsy Sister     Lupus Sister     Blindness Maternal Grandmother     Diabetes Unknown         "My entire family family has diabetes"    Stroke Maternal Aunt     Amblyopia Neg Hx     Cataracts Neg Hx     Glaucoma Neg Hx     Macular degeneration Neg Hx     Retinal detachment Neg Hx     Strabismus Neg Hx      Social History     Socioeconomic History    Marital status:      Spouse name: Not on file    Number of children: Not on file    Years of education: Not on file    Highest education level: Not on file   Social Needs    Financial resource strain: Not on file    Food insecurity - worry: Not on file    Food insecurity - inability: Not on file    Transportation " "needs - medical: Not on file    Transportation needs - non-medical: Not on file   Occupational History    Not on file   Tobacco Use    Smoking status: Never Smoker    Smokeless tobacco: Never Used   Substance and Sexual Activity    Alcohol use: No     Comment: occasional wine cooler     Drug use: No    Sexual activity: Yes     Partners: Male   Other Topics Concern    Not on file   Social History Narrative    . 2 children(Wilder and Reina). Does not work. Previously worked as a .      Current Outpatient Medications on File Prior to Visit   Medication Sig    acetaminophen (TYLENOL) 325 MG tablet Take 2 tablets (650 mg total) by mouth every 6 (six) hours as needed for Pain.    alprazolam (XANAX ORAL) Take 0.5 mg by mouth as needed (anxiety).     aspirin (ECOTRIN) 81 MG EC tablet Take 81 mg by mouth every morning.     atorvastatin (LIPITOR) 40 MG tablet TAKE 1 TABLET ONE TIME DAILY FOR CHOLESTEROL (Patient taking differently: Take 40 mg by mouth every evening. TAKE 1 TABLET ONE TIME DAILY FOR CHOLESTEROL)    BD INSULIN PEN NEEDLE UF SHORT 31 gauge x 5/16" Ndle USE TO INJECT NOVOLOG FLEXPEN BEFORE MEALS    blood sugar diagnostic (ACCU-CHEK SMARTVIEW TEST STRIP) Strp 1 strip by Misc.(Non-Drug; Combo Route) route 2 (two) times daily.    carvedilol (COREG) 25 MG tablet Take 1 tablet (25 mg total) by mouth 2 (two) times daily.    cyclobenzaprine (FLEXERIL) 5 MG tablet 2 tabs to start and then 1 tab Q8 hours as needed for muscle spasm    DULCOLAX, BISACODYL, ORAL Take by mouth as needed (constipation).    ergocalciferol (ERGOCALCIFEROL) 50,000 unit Cap 1 capsule (50,000 Units total) by Per G Tube route every 7 days. (Patient taking differently: Take 50,000 Units by mouth every 7 days. Takes on Thurs)    famotidine (PEPCID) 20 MG tablet Take 1 tablet (20 mg total) by mouth once daily. (Patient taking differently: Take 20 mg by mouth every morning. )    ferrous sulfate 220 mg (44 mg " iron)/5 mL solution 10ml daily for Iron/Give via PEG (Patient taking differently: Take 220 mg by mouth every morning. )    folic acid (FOLVITE) 1 MG tablet Take 1 tablet (1 mg total) by mouth once daily. (Patient taking differently: Take 1 mg by mouth every morning. )    furosemide (LASIX) 40 MG tablet Take 1 tablet (40 mg total) by mouth once daily. (Patient taking differently: Take 40 mg by mouth every morning. )    levETIRAcetam (KEPPRA) 500 MG Tab Take 1 tablet (500 mg total) by mouth every 8 (eight) hours.    oxyCODONE-acetaminophen (PERCOCET) 5-325 mg per tablet Take 1 tablet by mouth every 4 (four) hours as needed for Pain.    polyethylene glycol 3350 (MIRALAX ORAL) Take by mouth every evening.     pregabalin (LYRICA) 25 MG capsule 1 cap in AM and 2 caps at Bedtime for muscle spasm    sevelamer carbonate (RENVELA) 800 mg Tab Take 800 mg by mouth 3 (three) times daily with meals.    sodium bicarbonate 650 MG tablet Take 2 tablets (1,300 mg total) by mouth 2 (two) times daily.    traZODone (DESYREL) 100 MG tablet Take 1 tablet (100 mg total) by mouth every evening.    albuterol (PROVENTIL) 2.5 mg /3 mL (0.083 %) nebulizer solution Take 3 mLs (2.5 mg total) by nebulization every 6 (six) hours as needed for Wheezing. Rescue     Current Facility-Administered Medications on File Prior to Visit   Medication    onabotulinumtoxina injection 300 Units       REVIEW OF SYSTEMS:  General: negative; ENT: negative; Allergy and Immunology: negative; Hematological and Lymphatic: Negative; Endocrine: negative; Respiratory: no cough, shortness of breath, or wheezing; Cardiovascular: no chest pain or dyspnea on exertion; Gastrointestinal: no abdominal pain/back, change in bowel habits, or bloody stools; Genito-Urinary: no dysuria, trouble voiding, or hematuria; Musculoskeletal: negative  Neurological: no TIA or stroke symptoms    PHYSICAL EXAM:      Pulse: 76   Vitals:    11/29/18 1308   BP: 135/84   Pulse: 76   Temp:  98.8 °F (37.1 °C)       General appearance:  Alert, well-appearing, and in no distress.  Oriented to person, place, and time   Neurological: Normal speech, no focal findings noted; CN II - XII grossly intact           Musculoskeletal: Digits/nail without cyanosis/clubbing.  Normal muscle strength/tone.                 Neck: Supple, no significant adenopathy; thyroid is not enlarged                  No carotid bruit can be auscultated                Chest:  Clear to auscultation, no wheezes, rales or rhonchi, symmetric air entry right permacath     No use of accessory muscles             Cardiac: Normal rate and regular rhythm, S1 and S2 normal; PMI non-displaced          Abdomen: Soft, nontender, nondistended, no masses or organomegaly     No rebound tenderness noted; bowel sounds normal     Pulsatile aortic mass is no palpable.     No groin adenopathy      Extremities:   RUE VAG with good thrill     Non-palpable R radial pulse (pre-op 2+ R radial pulse)     Positive Davidson's test        R hand - motor/sensitive intact    LAB RESULTS:  Lab Results   Component Value Date    K 4.0 08/31/2018    K 4.6 08/25/2018    K 4.7 08/17/2018    CREATININE 3.9 (H) 08/31/2018    CREATININE 3.1 (H) 08/25/2018    CREATININE 4.0 (H) 08/17/2018     Lab Results   Component Value Date    WBC 7.62 08/31/2018    WBC 7.18 08/25/2018    WBC 6.06 08/17/2018    HCT 28.2 (L) 08/31/2018    HCT 27.8 (L) 08/25/2018    HCT 22.3 (L) 08/17/2018     08/31/2018     08/25/2018     08/17/2018     Lab Results   Component Value Date    HGBA1C 5.7 (H) 08/31/2018    HGBA1C 5.6 08/10/2018    HGBA1C 5.8 (H) 04/25/2018     IMAGING:  Previous:  AVG u/s: flow vol 858 ml/min; no stenosis    Previous:  Vein mapping:  Right- cephalic medial forearm 4.1, basilic antecubital 6.3  Left- cpehalic vein 2.0, antecubital fossa 3.6    IMP/PLAN:  58 y.o. female with a history of HTN, HLD, L hemiplegia after large stroke in 2016, DM II, stage V CKD since  August 2018 - doing well s/p creation of RUE AVG  Cont ASA 81 po qd  Has been using RUE AVG with no issues  Permacath removed today  Follow-up with me in 3 months for PE and u/s surveillance; sooner should issues arise    Meir Valencia MD FACS  Vascular/Endovascular Surgery    Tunneled Central Venous Catheter Removal    Procedure:  The patient was prepped and draped in sterile fashion. 2% Lidocaine with Epinephrine was used for local anesthetic. A blunt hemostat was used to dissect the exit sheath and fibrin sheath from the catheter cuff. After the cuff was freed, the catheter was pulled and pressure was applied to the venotomy. Once hemostasis was achieved, a pressure dressing was applied.

## 2018-11-29 NOTE — TELEPHONE ENCOUNTER
----- Message from Judson Blue LCSW sent at 11/29/2018 10:08 AM CST -----  Dr. Max:    I spoke to this patient today for follow up with Outpatient Case Management. Mrs. Perez is asking for orders for an one-hand walker. She is also asking for PT. I have an appointment scheduled for her on next week with PMR for follow up.     Please advise patient.     Thank you!  Bebe Blue LCSW  [id number]  Outpatient Complex Care Management

## 2018-11-30 NOTE — TELEPHONE ENCOUNTER
Judson, I would like to defer to PMR/Dr Pardo as to whether the one-handed walker is appropriate or not.  Thanks, Beverly Muniz

## 2018-12-03 DIAGNOSIS — E53.8 FOLATE DEFICIENCY: ICD-10-CM

## 2018-12-03 DIAGNOSIS — I10 HYPERTENSION, UNSPECIFIED TYPE: ICD-10-CM

## 2018-12-03 DIAGNOSIS — I61.9 HEMORRHAGIC CEREBROVASCULAR ACCIDENT (CVA): ICD-10-CM

## 2018-12-03 RX ORDER — FOLIC ACID 1 MG/1
1 TABLET ORAL DAILY
Qty: 90 TABLET | Refills: 2 | Status: SHIPPED | OUTPATIENT
Start: 2018-12-03 | End: 2020-03-04

## 2018-12-03 RX ORDER — CARVEDILOL 25 MG/1
25 TABLET ORAL 2 TIMES DAILY
Qty: 180 TABLET | Refills: 2 | Status: SHIPPED | OUTPATIENT
Start: 2018-12-03 | End: 2018-12-04 | Stop reason: SDUPTHER

## 2018-12-03 RX ORDER — FAMOTIDINE 20 MG/1
20 TABLET, FILM COATED ORAL DAILY
Qty: 90 TABLET | Refills: 2 | Status: SHIPPED | OUTPATIENT
Start: 2018-12-03 | End: 2020-03-04

## 2018-12-04 ENCOUNTER — OUTPATIENT CASE MANAGEMENT (OUTPATIENT)
Dept: ADMINISTRATIVE | Facility: OTHER | Age: 58
End: 2018-12-04

## 2018-12-04 DIAGNOSIS — I10 HYPERTENSION, UNSPECIFIED TYPE: ICD-10-CM

## 2018-12-04 RX ORDER — CARVEDILOL 25 MG/1
25 TABLET ORAL 2 TIMES DAILY
Qty: 180 TABLET | Refills: 2 | Status: ON HOLD | OUTPATIENT
Start: 2018-12-04 | End: 2019-07-11 | Stop reason: HOSPADM

## 2018-12-04 NOTE — PROGRESS NOTES
Summary:  3rd follow-up attempt    Interventions:  Left message - explaining purpose of call - want to follow-up on bronchitis and D/C as previously discussed      Plan:  RN case closure - all goals but one met - the remaining was partially met due to inability to reach pt for follow-uo

## 2018-12-06 ENCOUNTER — TELEPHONE (OUTPATIENT)
Dept: INTERNAL MEDICINE | Facility: CLINIC | Age: 58
End: 2018-12-06

## 2018-12-06 ENCOUNTER — OUTPATIENT CASE MANAGEMENT (OUTPATIENT)
Dept: ADMINISTRATIVE | Facility: OTHER | Age: 58
End: 2018-12-06

## 2018-12-06 NOTE — TELEPHONE ENCOUNTER
Spoke with patient, explained if appointment is changed it may be April before patient can be seen, she stated she will keep appointment----- Message from Vickie Martinez sent at 12/6/2018  1:54 PM CST -----  Contact: [phone]  Patient is requesting if her appt can be seen on a Tues or Thurs. Patient goes to dialysis on Friday.  Patient is schedule for  1-.    Please advise, thank you

## 2018-12-07 ENCOUNTER — HOSPITAL ENCOUNTER (EMERGENCY)
Facility: HOSPITAL | Age: 58
Discharge: HOME | End: 2018-12-08
Attending: EMERGENCY MEDICINE
Payer: MEDICARE

## 2018-12-07 ENCOUNTER — NURSE TRIAGE (OUTPATIENT)
Dept: ADMINISTRATIVE | Facility: CLINIC | Age: 58
End: 2018-12-07

## 2018-12-07 DIAGNOSIS — I82.C21 INTERNAL JUGULAR (IJ) VEIN THROMBOEMBOLISM, CHRONIC, RIGHT: ICD-10-CM

## 2018-12-07 DIAGNOSIS — R20.2 PARESTHESIA OF RIGHT ARM: ICD-10-CM

## 2018-12-07 DIAGNOSIS — M79.601 RIGHT ARM PAIN: ICD-10-CM

## 2018-12-07 DIAGNOSIS — R73.9 HYPERGLYCEMIA: Primary | ICD-10-CM

## 2018-12-07 PROBLEM — R22.9 SOFT TISSUE SWELLING: Status: RESOLVED | Noted: 2018-09-20 | Resolved: 2018-12-07

## 2018-12-07 PROCEDURE — 96366 THER/PROPH/DIAG IV INF ADDON: CPT | Mod: HCNC

## 2018-12-07 PROCEDURE — 99284 PR EMERGENCY DEPT VISIT,LEVEL IV: ICD-10-PCS | Mod: HCNC,,, | Performed by: PHYSICIAN ASSISTANT

## 2018-12-07 PROCEDURE — P9612 CATHETERIZE FOR URINE SPEC: HCPCS | Mod: HCNC

## 2018-12-07 PROCEDURE — 82962 GLUCOSE BLOOD TEST: CPT | Mod: HCNC

## 2018-12-07 PROCEDURE — 99285 EMERGENCY DEPT VISIT HI MDM: CPT | Mod: 25,HCNC

## 2018-12-07 PROCEDURE — 96365 THER/PROPH/DIAG IV INF INIT: CPT | Mod: HCNC

## 2018-12-07 PROCEDURE — 99284 EMERGENCY DEPT VISIT MOD MDM: CPT | Mod: HCNC,,, | Performed by: PHYSICIAN ASSISTANT

## 2018-12-08 VITALS
WEIGHT: 176 LBS | BODY MASS INDEX: 31.18 KG/M2 | OXYGEN SATURATION: 99 % | HEIGHT: 63 IN | DIASTOLIC BLOOD PRESSURE: 65 MMHG | TEMPERATURE: 99 F | RESPIRATION RATE: 18 BRPM | SYSTOLIC BLOOD PRESSURE: 146 MMHG | HEART RATE: 73 BPM

## 2018-12-08 LAB
ALBUMIN SERPL BCP-MCNC: 3.4 G/DL
ALLENS TEST: ABNORMAL
ALP SERPL-CCNC: 94 U/L
ALT SERPL W/O P-5'-P-CCNC: 14 U/L
ANION GAP SERPL CALC-SCNC: 17 MMOL/L
AST SERPL-CCNC: 22 U/L
B-OH-BUTYR BLD STRIP-SCNC: 0 MMOL/L
BASOPHILS # BLD AUTO: 0.04 K/UL
BASOPHILS NFR BLD: 0.8 %
BILIRUB SERPL-MCNC: 0.5 MG/DL
BUN SERPL-MCNC: 38 MG/DL
BUN SERPL-MCNC: 46 MG/DL (ref 6–30)
CALCIUM SERPL-MCNC: 9.3 MG/DL
CHLORIDE SERPL-SCNC: 93 MMOL/L (ref 95–110)
CHLORIDE SERPL-SCNC: 95 MMOL/L
CO2 SERPL-SCNC: 26 MMOL/L
CREAT SERPL-MCNC: 3.7 MG/DL (ref 0.5–1.4)
CREAT SERPL-MCNC: 4.2 MG/DL
DELSYS: ABNORMAL
DIFFERENTIAL METHOD: ABNORMAL
EOSINOPHIL # BLD AUTO: 0.3 K/UL
EOSINOPHIL NFR BLD: 5.2 %
ERYTHROCYTE [DISTWIDTH] IN BLOOD BY AUTOMATED COUNT: 13.8 %
EST. GFR  (AFRICAN AMERICAN): 12.5 ML/MIN/1.73 M^2
EST. GFR  (NON AFRICAN AMERICAN): 10.8 ML/MIN/1.73 M^2
GLUCOSE SERPL-MCNC: 440 MG/DL (ref 70–110)
GLUCOSE SERPL-MCNC: 500 MG/DL (ref 70–110)
GLUCOSE SERPL-MCNC: 507 MG/DL
HCO3 UR-SCNC: 34.4 MMOL/L (ref 24–28)
HCT VFR BLD AUTO: 37.4 %
HCT VFR BLD CALC: 40 %PCV (ref 36–54)
HGB BLD-MCNC: 11.5 G/DL
IMM GRANULOCYTES # BLD AUTO: 0.01 K/UL
IMM GRANULOCYTES NFR BLD AUTO: 0.2 %
INR PPP: 0.9
LYMPHOCYTES # BLD AUTO: 1.4 K/UL
LYMPHOCYTES NFR BLD: 28 %
MCH RBC QN AUTO: 30.4 PG
MCHC RBC AUTO-ENTMCNC: 30.7 G/DL
MCV RBC AUTO: 99 FL
MODE: ABNORMAL
MONOCYTES # BLD AUTO: 0.5 K/UL
MONOCYTES NFR BLD: 9.9 %
NEUTROPHILS # BLD AUTO: 2.8 K/UL
NEUTROPHILS NFR BLD: 55.9 %
NRBC BLD-RTO: 0 /100 WBC
PCO2 BLDA: 59.2 MMHG (ref 35–45)
PH SMN: 7.37 [PH] (ref 7.35–7.45)
PLATELET # BLD AUTO: 172 K/UL
PMV BLD AUTO: 10.9 FL
PO2 BLDA: 36 MMHG (ref 40–60)
POC BE: 9 MMOL/L
POC IONIZED CALCIUM: 1.01 MMOL/L (ref 1.06–1.42)
POC SATURATED O2: 66 % (ref 95–100)
POC TCO2 (MEASURED): 30 MMOL/L (ref 23–29)
POC TCO2: 36 MMOL/L (ref 24–29)
POCT GLUCOSE: 135 MG/DL (ref 70–110)
POCT GLUCOSE: 139 MG/DL (ref 70–110)
POCT GLUCOSE: 441 MG/DL (ref 70–110)
POCT GLUCOSE: 82 MG/DL (ref 70–110)
POCT GLUCOSE: 97 MG/DL (ref 70–110)
POTASSIUM BLD-SCNC: 4.2 MMOL/L (ref 3.5–5.1)
POTASSIUM SERPL-SCNC: 4.3 MMOL/L
PROT SERPL-MCNC: 8.1 G/DL
PROTHROMBIN TIME: 9.4 SEC
RBC # BLD AUTO: 3.78 M/UL
SAMPLE: ABNORMAL
SAMPLE: ABNORMAL
SITE: ABNORMAL
SODIUM BLD-SCNC: 139 MMOL/L (ref 136–145)
SODIUM SERPL-SCNC: 138 MMOL/L
WBC # BLD AUTO: 5.03 K/UL

## 2018-12-08 PROCEDURE — 82010 KETONE BODYS QUAN: CPT | Mod: HCNC

## 2018-12-08 PROCEDURE — 93010 ELECTROCARDIOGRAM REPORT: CPT | Mod: HCNC,,, | Performed by: INTERNAL MEDICINE

## 2018-12-08 PROCEDURE — 85025 COMPLETE CBC W/AUTO DIFF WBC: CPT | Mod: HCNC

## 2018-12-08 PROCEDURE — 63600175 PHARM REV CODE 636 W HCPCS: Mod: HCNC | Performed by: PHYSICIAN ASSISTANT

## 2018-12-08 PROCEDURE — 80053 COMPREHEN METABOLIC PANEL: CPT | Mod: HCNC

## 2018-12-08 PROCEDURE — 85610 PROTHROMBIN TIME: CPT | Mod: HCNC

## 2018-12-08 PROCEDURE — 93005 ELECTROCARDIOGRAM TRACING: CPT | Mod: HCNC

## 2018-12-08 PROCEDURE — 82962 GLUCOSE BLOOD TEST: CPT | Mod: HCNC,91

## 2018-12-08 PROCEDURE — 99900035 HC TECH TIME PER 15 MIN (STAT): Mod: HCNC

## 2018-12-08 PROCEDURE — 82803 BLOOD GASES ANY COMBINATION: CPT | Mod: HCNC

## 2018-12-08 PROCEDURE — 25000003 PHARM REV CODE 250: Mod: HCNC | Performed by: PHYSICIAN ASSISTANT

## 2018-12-08 PROCEDURE — 93010 EKG 12-LEAD: ICD-10-PCS | Mod: HCNC,,, | Performed by: INTERNAL MEDICINE

## 2018-12-08 RX ORDER — DEXTROSE MONOHYDRATE 100 MG/ML
1000 INJECTION, SOLUTION INTRAVENOUS
Status: DISCONTINUED | OUTPATIENT
Start: 2018-12-08 | End: 2018-12-08

## 2018-12-08 RX ORDER — OXYCODONE AND ACETAMINOPHEN 5; 325 MG/1; MG/1
1 TABLET ORAL
Status: COMPLETED | OUTPATIENT
Start: 2018-12-08 | End: 2018-12-08

## 2018-12-08 RX ORDER — SODIUM CHLORIDE 0.9 % (FLUSH) 0.9 %
5 SYRINGE (ML) INJECTION
Status: DISCONTINUED | OUTPATIENT
Start: 2018-12-08 | End: 2018-12-08

## 2018-12-08 RX ADMIN — DEXTROSE MONOHYDRATE 25 G: 500 INJECTION PARENTERAL at 04:12

## 2018-12-08 RX ADMIN — OXYCODONE HYDROCHLORIDE AND ACETAMINOPHEN 1 TABLET: 5; 325 TABLET ORAL at 04:12

## 2018-12-08 RX ADMIN — SODIUM CHLORIDE 250 ML: 0.9 INJECTION, SOLUTION INTRAVENOUS at 02:12

## 2018-12-08 RX ADMIN — SODIUM CHLORIDE 8 UNITS/HR: 9 INJECTION, SOLUTION INTRAVENOUS at 02:12

## 2018-12-08 NOTE — ED NOTES
Patient was discharged. No further complaints or question by patient or family. Belonging sent with patient

## 2018-12-08 NOTE — ED PROVIDER NOTES
"Encounter Date: 12/7/2018    SCRIBE #1 NOTE: I, Panchito Breen, am scribing for, and in the presence of,  Dr. Gay. I have scribed the following portions of the note - the APC attestation.       History     Chief Complaint   Patient presents with    Numbness     Pt to ER c/o numbness to right hand since 1600 today after receiving dialysis. Denies HA, unilateral weakness or dizziness. Pt has dialysis shunt to right upper arm.      Patient is a 58 year old female with PMHx of hx of CVA with left sided deficits, d-CHF with 65%EF, HTN, HLD, DM2 with neuropathy, ESRD on dialysis (MWF), pulmonary HTN, sarcoidosis, anemia, rheumatoid arthritis, epilepsy, bipolar disorder, anxiety, and depression. She presents to the ED for numbness. She reports having right arm pain and numbness onset 4:00PM after completing dialysis today. Reports having right arm pain during dialysis session. "reports feeling tight rubber band around right forearm." Reports numbness resolved 15 minutes PTA. She denies HA, visual disturbances, slurred speech, gait abnormalities, or motor weakness. She denies fever,chills, nausea, vomiting, sob, chest pain, abd pain, dysuria, diarrhea, or constipation. She is a non smoker and denies alcohol use. Patient is wheelchair bound.          Review of patient's allergies indicates:   Allergen Reactions    Lisinopril Other (See Comments)     Angioedema      Vicodin [hydrocodone-acetaminophen] Rash     No problem with acetaminophen      Past Medical History:   Diagnosis Date    Anemia of chronic disease 6/10/2017    Anxiety     Arthritis of right acromioclavicular joint 7/2/2014    Asthma     Bipolar disorder     Bronchitis, acute     Cataract     Cholelithiasis     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    Cortical cataract of both eyes 7/26/2016    Degeneration of lumbar or lumbosacral intervertebral disc 3/5/2013    S/p MRI L-spine 5/2009     Depression     " ESRD on hemodialysis     Started August 2018    General anesthetics causing adverse effect in therapeutic use     Hemorrhagic cerebrovascular accident (CVA)     8/2016 s/p Hemicraniotomy at [facility] [place] with Left hemiparesis    Hemorrhagic cerebrovascular accident (CVA) 1/3/2018    History of stroke 6/28/2017    s/p R-MCA stroke with R-putaminal hemorrhagic transformation in 8/2016 and 11/2016 (s/p hemicraniotomy at [facility] [place]) with residual L hemiparesis, on AED s/p CVA      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    Hyperlipidemia     Hypertensive retinopathy of both eyes 7/26/2016    Impingement syndrome of right shoulder 7/2/2014    Left ventricular diastolic dysfunction with preserved systolic function 12/15/2015    Mixed anxiety and depressive disorder 3/5/2013    Obesity     THERESA (obstructive sleep apnea) 3/5/2013    No Home CPAP 2ndary to cost     Partial symptomatic epilepsy with complex partial seizures, not intractable, without status epilepticus     PEG (percutaneous endoscopic gastrostomy) status 6/28/2017    Renovascular hypertension 3/5/2013    Will resume home medications: amlodipine, labetalol, and hydralazine Will hold Lisinopril in setting of DARLING.   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 without macular edema, without long-term current use of insulin 8/2/2017    Type 2 diabetes mellitus with diabetic polyneuropathy, without long-term current use of insulin 10/22/2015    Type 2 diabetes mellitus with stage 3 chronic kidney disease, without long-term current use of insulin 10/22/2015    Vertebral artery stenosis 3/5/2013    S/p Stenting per Dr Burnett      Past Surgical History:  "  Procedure Laterality Date    ARTHROSCOPY-SHOULDER WITH SUBACROMIAL DECOMPRESSION Right 7/9/2014    Performed by Kendall Pantoja [facility] at [facility] OR    AV GRAFT CREATION Right 10/18/2018    Performed by Meir Valencia MD at [facility] OR 2ND FLR    Biceps Tenotomy Right 7/9/2014    Performed by Kendall [facility]antoja MD at [facility] OR    BREAST SURGERY      breast reduction    COLONOSCOPY N/A 8/11/2016    Procedure: COLONOSCOPY;  Surgeon: Jerry Vilchis MD;  Location: [facility] ENDO (4TH FLR);  Service: Endoscopy;  Laterality: N/A;  Patient reports difficulty awaking from anesthesia in the past.    COLONOSCOPY N/A 8/11/2016    Performed by Jerry Vilchis MD at [facility] ENDO (4TH FLR)    DEBRIDEMENT-SHOULDER-ARTHROSCOPIC Labral Cuff Right 7/9/2014    Performed by Kendall [facility]ja [facility] at [facility] OR    ESOPHAGOGASTRODUODENOSCOPY (EGD) N/A 12/6/2017    Performed by Dallas Lock Jr., [facility] at [facility] ENDO    VKIWRQEP-NRNLCOPP-HGJLQS END Right 7/9/2014    Performed by Kendall [facility]antoja MD at [facility] OR    HYSTERECTOMY      PLACEMENT OF ARTERIOVENOUS GRAFT Right 10/18/2018    Procedure: AV GRAFT CREATION;  Surgeon: Meir Valencia MD;  Location: [facility] OR 2ND FLR;  Service: Cardiovascular;  Laterality: Right;    ROTATOR CUFF REPAIR Right July 9, 2014    right side    Skull surgery      Aneurysm    stent placed      in vertebral artery    TOTAL REDUCTION MAMMOPLASTY      TUBAL LIGATION       Family History   Problem Relation Age of Onset    Cancer Mother 55        Breast cancer    Diabetes Mother     Hypertension Mother     Heart disease Mother     Heart attack Mother     Breast cancer Mother     Stroke Sister     Hypertension Sister     Sleep apnea Sister     No Known Problems Daughter     Bell's palsy Sister     Lupus Sister     Blindness Maternal Grandmother     Diabetes Unknown         "My entire family family has diabetes"    Stroke Maternal Aunt     Amblyopia Neg Hx     Cataracts Neg Hx     Glaucoma Neg " Hx     Macular degeneration Neg Hx     Retinal detachment Neg Hx     Strabismus Neg Hx      Social History     Tobacco Use    Smoking status: Never Smoker    Smokeless tobacco: Never Used   Substance Use Topics    Alcohol use: No     Comment: occasional wine cooler     Drug use: No     Review of Systems   Constitutional: Negative for fever.   HENT: Negative for sore throat.    Respiratory: Negative for shortness of breath.    Cardiovascular: Negative for chest pain.   Gastrointestinal: Negative for abdominal pain, nausea and vomiting.   Genitourinary: Negative for dysuria.   Musculoskeletal: Positive for arthralgias (right arm pain). Negative for back pain.   Skin: Negative for rash.   Neurological: Positive for numbness. Negative for weakness.   Hematological: Does not bruise/bleed easily.       Physical Exam     Initial Vitals [12/07/18 2335]   BP Pulse Resp Temp SpO2   126/71 82 16 98 °F (36.7 °C) 97 %      MAP       --         Physical Exam    Vitals reviewed.  Constitutional: She appears well-developed and well-nourished. No distress.   HENT:   Head: Normocephalic.   Eyes: Conjunctivae and EOM are normal. Pupils are equal, round, and reactive to light.   Neck: Normal range of motion.   Cardiovascular: Normal rate and regular rhythm.   No murmur heard.  Pulses:       Radial pulses are 1+ on the right side, and 2+ on the left side.   Unable to palpate thrill over AV fistula of R UE, but audible movement with auscultation.    Pulmonary/Chest: Breath sounds normal. No respiratory distress. She has no wheezes. She has no rales.   Abdominal: Soft. Bowel sounds are normal. She exhibits no distension. There is no tenderness.   Musculoskeletal: Normal range of motion.   Neurological: She is alert and oriented to person, place, and time. No sensory deficit. GCS eye subscore is 4. GCS verbal subscore is 5. GCS motor subscore is 6.   Motor strength of R UE 5/5, R LE 5/5, L UE 0/5 and L LE 0/5. (baseline hx of CVA)  No facial droop or asymmetry. Speech is clear. Tongue protrudes midline with no fasciculations. Gait not assessed, patient wheelchair bound at baseline.    Skin: Skin is warm and dry. Capillary refill takes less than 2 seconds. No erythema.         ED Course   Procedures  Labs Reviewed   COMPREHENSIVE METABOLIC PANEL - Abnormal; Notable for the following components:       Result Value    Glucose 507 (*)     BUN, Bld 38 (*)     Creatinine 4.2 (*)     Albumin 3.4 (*)     Anion Gap 17 (*)     eGFR if  12.5 (*)     eGFR if non  10.8 (*)     All other components within normal limits   CBC W/ AUTO DIFFERENTIAL - Abnormal; Notable for the following components:    RBC 3.78 (*)     Hemoglobin 11.5 (*)     MCV 99 (*)     MCHC 30.7 (*)     All other components within normal limits   ISTAT PROCEDURE - Abnormal; Notable for the following components:    POC Glucose 500 (*)     POC BUN 46 (*)     POC Creatinine 3.7 (*)     POC Chloride 93 (*)     POC TCO2 (MEASURED) 30 (*)     POC Ionized Calcium 1.01 (*)     All other components within normal limits   ISTAT PROCEDURE - Abnormal; Notable for the following components:    POC PCO2 59.2 (*)     POC PO2 36 (*)     POC HCO3 34.4 (*)     POC SATURATED O2 66 (*)     POC TCO2 36 (*)     All other components within normal limits   POCT GLUCOSE - Abnormal; Notable for the following components:    POCT Glucose 441 (*)     All other components within normal limits   POCT GLUCOSE - Abnormal; Notable for the following components:    POCT Glucose 139 (*)     All other components within normal limits   PROTIME-INR   BETA - HYDROXYBUTYRATE, SERUM   BETA - HYDROXYBUTYRATE, SERUM   POCT GLUCOSE   POCT GLUCOSE   POCT GLUCOSE MONITORING CONTINUOUS   ISTAT CHEM8   POCT GLUCOSE MONITORING CONTINUOUS   POCT GLUCOSE MONITORING CONTINUOUS          Imaging Results          US Upper Extremity Veins Right (Final result)     Abnormal  Result time 12/08/18 04:09:05    Final  result by Misael Garcia MD (12/08/18 04:09:05)                 Impression:      Partially occlusive mural echogenic material within the right internal jugular vein with recanalized central lumen, age-indeterminate, probably chronic right internal jugular DVT.    Heterogenous echogenic material surrounding portions of the right upper extremity fistula.  This is nonspecific and could represent hematoma, infection not excluded.  Limited assessment of the fistula demonstrates patency.    This report was flagged in Epic as abnormal.    COMMUNICATION  This critical result was discovered/received at 12/08/2018 at 03:53.  The critical information above was relayed directly by me by telephone to Dr. Gotti on 12/08/2018 at 03:55.    Electronically signed by resident: Nj Canseco  Date:    12/08/2018  Time:    03:42    Electronically signed by: Misael Garcia MD  Date:    12/08/2018  Time:    04:09             Narrative:    EXAMINATION:  US UPPER EXTREMITY VEINS RIGHT    CLINICAL HISTORY:  right arm pain    TECHNIQUE:  Duplex and color flow Doppler evaluation and dynamic compression was performed of the right upper extremity veins.    COMPARISON:  None    FINDINGS:  Central veins: There is partially occlusive mural echogenic material within the right internal jugular vein with recanalized central lumen.  The subclavian, and axillary veins are patent and free of thrombus.    Arm veins: The brachial, basilic, and cephalic veins are patent and compressible.    Contralateral subclavian/internal jugular veins: The left subclavian and internal jugular veins are patent and free of thrombus.    Other findings: There is heterogenous mixed echogenic material surrounding 2 portions of the right upper extremity fistula which measure 4.1 x 2.1 x 1.0 cm and 4.1 x 2.6 x 0.7 cm.  Limited assessment of the fistula demonstrates patency.                               X-Ray Chest AP Portable (Final result)  Result time 12/08/18  02:35:05    Final result by Misael Garcia MD (12/08/18 02:35:05)                 Impression:      No significant change from prior study.      Electronically signed by: Misael Garcia MD  Date:    12/08/2018  Time:    02:35             Narrative:    EXAMINATION:  XR CHEST AP PORTABLE    CLINICAL HISTORY:  hyperglycemia;    TECHNIQUE:  Single frontal view of the chest was performed.    COMPARISON:  August 9, 2018.    FINDINGS:  Elevated right hemidiaphragm with subsegmental atelectatic change right base, unchanged.    Heart and lungs  appear unchanged when allowing for differences in technique and positioning.                                 Medical Decision Making:   History:   Old Medical Records: I decided to obtain old medical records.  Clinical Tests:   Lab Tests: Ordered and Reviewed  Radiological Study: Reviewed and Ordered       APC / Resident Notes:   Patient is a 58 year old female presents to the ED for emergent evaluation of right arm pain.     Will order labs and imaging. Will continue to monitor.     Differential diagnoses include, but are not limited to: fistula occlusion, neuropathy, hypercoagulability, or electrolyte imbalance.     No leukocytosis. Hemodynamically stable. Critical elevated glucose 507. Will administer insulin infusion 8 U/hr. Elevated BUN 38 and Cr 4.2 (patient with ESRD on dialysis, last dialyzed today). CXR found to have levated right hemidiaphragm with subsegmental atelectatic change right base, unchanged.     US upper extremity found to have chronic right internal jugular DVT. Heterogenous echogenic material surrounding portions of the right upper extremity fistula nonspecific, limited assessment of the fistula demonstrates patency. Patient received 16 U of insulin over 2 hours. POCT glucose 97. Patient received amp D50 as precaution. Repeat POCT glucose 139. Patient reassessed, patient resting comfortably in NAD on RA. Reports right arm paresthesia resolved, denies  recurrence of pain or paresthesia. Patient tolerating sandwich without difficulty.     5:45 AM  Case signed out to attending, Dr. Gotti, pending repeat POCT glucose. Anticipate discharge home.    I have discussed and reviewed with my supervising physician.       Scribe Attestation:   Scribe #1: I performed the above scribed service and the documentation accurately describes the services I performed. I attest to the accuracy of the note.    Attending Attestation:     Physician Attestation Statement for NP/PA:   I discussed this assessment and plan of this patient with the NP/PA, but I did not personally examine the patient. The face to face encounter was performed by the NP/PA.            Clinical Impression:   The primary encounter diagnosis was Hyperglycemia. Diagnoses of Right arm pain and Paresthesia of right arm were also pertinent to this visit.                             Bela Traore PA-C  12/08/18 0613

## 2018-12-08 NOTE — ED NOTES
Patient positioned and resting comfortably. Patient connected to cardiac monitor, BP, and O2. Breathing is unlabored. Patient was offered bathroom assistance and denies any need. call bell is within reach, stretcher at lowest position, side rails x2 are up for safety, and belongings are close to patient. Family at bedside. Will continue to monitor.

## 2018-12-08 NOTE — ED NOTES
Patient positioned and resting comfortably. Patient connected to cardiac monitor, BP, and O2. Breathing is unlabored. Family at the bedside. Patient was offered bathroom assistance and denies any need. call bell is within reach, stretcher at lowest position, side rails x2 are up for safety, and belongings are close to patient.  Will continue to monitor.  Family informed of decision to keep patient a little longer to have the ultrasound looked at by Vascular

## 2018-12-08 NOTE — ED NOTES
Unable to obtain IV access x 2 attempts from writer, 2 attempts from another RN.   Charge nurse notified.

## 2018-12-08 NOTE — ED NOTES
Lucy Perez, an 58 y.o. female presents to the ED  Right hand numbness and pain after dialysis today - she reports recently beginning to use new access right right upper arm - pt reports stiff feeling in fingers and a sensation that a rubber band is wrapped around her finger - + sensation + movement +1 radial pulse - she reports normal completion of dialysis today with no complications  Right arm is cool to touch and no thrill felt     Chief Complaint   Patient presents with    Numbness     Pt to ER c/o numbness to right hand since 1600 today after receiving dialysis. Denies HA, unilateral weakness or dizziness. Pt has dialysis shunt to right upper arm.      Review of patient's allergies indicates:   Allergen Reactions    Lisinopril Other (See Comments)     Angioedema      Vicodin [hydrocodone-acetaminophen] Rash     No problem with acetaminophen      Past Medical History:   Diagnosis Date    Anemia of chronic disease 6/10/2017    Anxiety     Arthritis of right acromioclavicular joint 7/2/2014    Asthma     Bipolar disorder     Bronchitis, acute     Cataract     Cholelithiasis     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    Cortical cataract of both eyes 7/26/2016    Degeneration of lumbar or lumbosacral intervertebral disc 3/5/2013    S/p MRI L-spine 5/2009     Depression     ESRD on hemodialysis     Started August 2018    General anesthetics causing adverse effect in therapeutic use     Hemorrhagic cerebrovascular accident (CVA)     8/2016 s/p Hemicraniotomy at [facility] [place] with Left hemiparesis    Hemorrhagic cerebrovascular accident (CVA) 1/3/2018    History of stroke 6/28/2017    s/p R-MCA stroke with R-putaminal hemorrhagic transformation in 8/2016 and 11/2016 (s/p hemicraniotomy at [facility] [place]) with residual L hemiparesis, on AED s/p CVA      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    Hyperlipidemia     Hypertensive retinopathy of both eyes 7/26/2016    Impingement  syndrome of right shoulder 7/2/2014    Left ventricular diastolic dysfunction with preserved systolic function 12/15/2015    Mixed anxiety and depressive disorder 3/5/2013    Obesity     THERESA (obstructive sleep apnea) 3/5/2013    No Home CPAP 2ndary to cost     Partial symptomatic epilepsy with complex partial seizures, not intractable, without status epilepticus     PEG (percutaneous endoscopic gastrostomy) status 6/28/2017    Renovascular hypertension 3/5/2013    Will resume home medications: amlodipine, labetalol, and hydralazine Will hold Lisinopril in setting of DARLING.   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 without macular edema, without long-term current use of insulin 8/2/2017    Type 2 diabetes mellitus with diabetic polyneuropathy, without long-term current use of insulin 10/22/2015    Type 2 diabetes mellitus with stage 3 chronic kidney disease, without long-term current use of insulin 10/22/2015    Vertebral artery stenosis 3/5/2013    S/p Stenting per Dr Burnett

## 2018-12-08 NOTE — TELEPHONE ENCOUNTER
Patient called to report the following:     -right AV shunt   -right hand is numb and loosing circulation and painful  -patient is anxious   -it's difficult to move   -started today after HD   -patient advised to report to ED     Reason for Disposition   [1] Numbness (i.e., loss of sensation) of the face, arm / hand, or leg / foot on one side of the body AND [2] sudden onset AND [3] present now    Protocols used: [facility]ICIT-A-AH

## 2018-12-08 NOTE — PROVIDER PROGRESS NOTES - EMERGENCY DEPT.
Encounter Date: 12/7/2018    ED Physician Progress Notes       SCRIBE NOTE: I, Luis Weiss , am scribing for, and in the presence of,  Tuan Gotti MD.  Physician Statement: ITuan MD, personally performed the services described in this documentation as scribed by Luis Weiss  in my presence, and it is both accurate and complete.     Physician Note:   Pt initially seen by Dr. Gay in conjunction with APC. I became involved in pt care after Dr. Gay left. Pt presented initially with nubness of arms. US was performed which shows patent dialysis graft with small hematoma likely due to dialysis axis. There is an old partial thrombus to the right IJ where pt currently has dialysis catheter. Pt symptoms have completely resolved. Pt was hyperglycemic and started on insulin drip by previous treatment team. Glucose dropped from 500 to 100. Was given D50 by APC. Will monitor for glucose stability and discharge home for primary care and recommend follow up with endocrinology for her diabetes. Pt previously off her diabetes medication.    0642 blood glucose trending upward.  Will discharge home recommend close outpatient follow up.

## 2018-12-08 NOTE — ED NOTES
Adult Physical Assessment:    Appearance: Patient resting comfortably on stretcher, and is in no acute distress at this time. Patient is clean and well groomed, with clothing properly fastened. Patient has no facial grimacing, and no indications of being in pain currently.    Cardiac: Heart sounds audible and without abnormalities noted. Patient attached to continuous cardiac monitor, automatic/cycling BP cuff, and continuous pulse ox. Patient has a normal rate and regular rhythm.     Neuro/LOC: Eyes open spontaneously, behavior appropriate to situation, follows commands, facial expression symmetrical, purposeful motor response noted. AAOx4; The patient is awake, alert and aware of environment with an appropriate affect, and speaking appropriately. Patient denies dizziness and HA    Respiratory: Breath sounds audible, equal and clear bilaterally. Airway is open and patent, respirations are spontaneous, even, and unlabored. Normal effort and rate noted, and without accessory muscle use.    Skin: Intact, warm and dry. Color is consistent with ethnicity. Normal skin turgor and moist mucous membranes.    Gastro: Bowel sounds audible and active x4 quadrants. Abdomen is soft, non-tender, and non-distended.    Musculoskeletal: Patient has full ROM RUE and RLE. Patient completely flaccid on LLE and LUE. This is due to prior CVA residual. Patient uses a wheelchair at home    Peripheral vascular: Pulses +2 x4 upper/lower extremities.     AV fistula RUE. Able to hear on Auscultation. Patient complains of pain and numbness to her right hand and fingers after dialysis 12/7/18    -Patient identifiers verified and correct for this patient.  -Patient resting with bedrails up x2 for safety, bed locked in low position, and call bell within reach.

## 2018-12-11 ENCOUNTER — TELEPHONE (OUTPATIENT)
Dept: INTERNAL MEDICINE | Facility: CLINIC | Age: 58
End: 2018-12-11

## 2018-12-11 ENCOUNTER — OUTPATIENT CASE MANAGEMENT (OUTPATIENT)
Dept: ADMINISTRATIVE | Facility: OTHER | Age: 58
End: 2018-12-11

## 2018-12-11 DIAGNOSIS — R73.9 HYPERGLYCEMIA: Primary | ICD-10-CM

## 2018-12-11 NOTE — TELEPHONE ENCOUNTER
Dr Valencia,  Pt's recent RUExtremity U/S showed continued partially occlusive thrombus in the right internal jugular vein.  I know she has her AVG in the right arm as well.  Is there any concerns with risk thrombosis or need to address this thrombus otherwise?  Thanks for your assistance, Beverly Muniz

## 2018-12-11 NOTE — TELEPHONE ENCOUNTER
"Spoke with patient, stated was told to inform MD of recent visit to ER for right arm pain, noted patient blood glucose was above 500, stated she received "insulin drip" and tylenol for pain.----- Message from [employer] sent at 12/11/2018  1:09 PM CST -----  Contact: [phone]  Patient is calling to give MD information on her ED visit . Please call and advise, Thanks      "

## 2018-12-11 NOTE — TELEPHONE ENCOUNTER
Myles, pt needs a HgbA1C.  Does she have home health still or does she need to come in to do lab?  I've placed the order. Thanks

## 2018-12-12 NOTE — TELEPHONE ENCOUNTER
Spoke with (patient at dialysis), stated she no longer receives home health and he will ask the dialysis center if they can draw the HgbA1c and get us the results.

## 2018-12-13 ENCOUNTER — OUTPATIENT CASE MANAGEMENT (OUTPATIENT)
Dept: ADMINISTRATIVE | Facility: OTHER | Age: 58
End: 2018-12-13

## 2018-12-13 NOTE — PROGRESS NOTES
Summary:  1st attempt to complete initial assessment    Interventions:  Left a message and requested return call - pt was just discharged on 12-4    Plan:  Follow-up next week 12-17 it no return call

## 2018-12-18 ENCOUNTER — PES CALL (OUTPATIENT)
Dept: ADMINISTRATIVE | Facility: CLINIC | Age: 58
End: 2018-12-18

## 2018-12-19 ENCOUNTER — OUTPATIENT CASE MANAGEMENT (OUTPATIENT)
Dept: ADMINISTRATIVE | Facility: OTHER | Age: 58
End: 2018-12-19

## 2018-12-19 ENCOUNTER — PATIENT MESSAGE (OUTPATIENT)
Dept: ADMINISTRATIVE | Facility: OTHER | Age: 58
End: 2018-12-19

## 2018-12-19 NOTE — LETTER
December 19, 2018    Lucy Burgos Chris  [address] [facility] [address]             [facility]  [address]  [facility] [address] Dear Ms. Chris    I work with [facility] Outpatient Case Management Department. We received a referral to call you to discuss your medical history.These services are free of charge and are offered to [facility] patients who have recently been discharged from any of our facilities or who have complex medical conditions that may require the skill of a nurse to assist with management.         .      I attempted to reach you by telephone, but I was unsuccessful. Please call our department so that we can go over some questions with you regarding your health.    The Outpatient Case Management Department can be reached at [phone] from 8:00AM to 4:30 PM on Monday thru Friday. [facility] also has a program where a nurse is available 24/7 to answer questions or provide medical advice, their number is [phone].    Thanks,  Conchita ColónVirginiaLenka Carranza RN  Outpatient Care Management

## 2018-12-19 NOTE — PROGRESS NOTES
Summary:  2nd attempt to complete assessment    Interventions:  Unable to leave message - letter sent via portal    Plan:  Follow-up with 3rd attempt tomorrow

## 2018-12-20 ENCOUNTER — OUTPATIENT CASE MANAGEMENT (OUTPATIENT)
Dept: ADMINISTRATIVE | Facility: OTHER | Age: 58
End: 2018-12-20

## 2018-12-20 NOTE — PROGRESS NOTES
Summary:  3rd attempt to complete initial assessment    Interventions:  None - unable to leave a message - mailbox full    Plan:  Case closed

## 2019-01-03 ENCOUNTER — TELEPHONE (OUTPATIENT)
Dept: INTERNAL MEDICINE | Facility: CLINIC | Age: 59
End: 2019-01-03

## 2019-01-03 DIAGNOSIS — I10 HYPERTENSION, UNSPECIFIED TYPE: ICD-10-CM

## 2019-01-03 DIAGNOSIS — E11.9 TYPE 2 DIABETES MELLITUS WITHOUT COMPLICATION, WITHOUT LONG-TERM CURRENT USE OF INSULIN: Primary | ICD-10-CM

## 2019-01-03 NOTE — TELEPHONE ENCOUNTER
----- Message from Adrian Morley sent at 1/3/2019 11:13 AM CST -----  Contact: Patient [phone]  Patient stating had a stroke was seen by a Specialist and was taken off of Diabetic Rx, stating is to speak with Dr and be informed as of the next steps for the patient.    Requesting for a call back for the response.    Patient [phone]    Please call an advise  Thank you

## 2019-01-04 NOTE — TELEPHONE ENCOUNTER
Spoke with patient and , in reference to diabetes, also spoke with Dr. Max who took care of issue----- Message from Dipti Franco sent at 1/3/2019  4:46 PM CST -----  Contact: Spouse [phone]  Patient would like to get medical advice.    Symptoms (please be specific):  Sugar went over 400 now 135-140    How long has patient had these symptoms:  3 weeks   [facility] [address] [place] [address] [phone] (Phone)  [phone] (Fax)    Any drug allergies:      Would you prefer a response via [employer]?:  No please call    Comments:  Please call and advise  Thank you

## 2019-01-04 NOTE — TELEPHONE ENCOUNTER
Myles, if Ms Ayala's glucoses are running 135-140, that's good-no medication/insulin needed at the moment.  She does have an appt with me later this month and should have fasting lab prior to that. I've placed lab orders; would you please schedule lab.  Maybe she can do it 1/10 when she comes in?  Thanks

## 2019-01-04 NOTE — TELEPHONE ENCOUNTER
----- Message from Myles Evans LPN sent at 1/3/2019  5:47 PM CST -----  Contact: Spouse [phone]   Please advise    ----- Message -----  From: Dipti Franco  Sent: 1/3/2019   4:46 PM  To: [facility] Staff    Patient would like to get medical advice.    Symptoms (please be specific):  Sugar went over 400 now 135-140    How long has patient had these symptoms:  3 weeks   [facility] [address] [place] [address] [phone] (Phone)  [phone] (Fax)    Any drug allergies:      Would you prefer a response via [employer]?:  No please call    Comments:  Please call and advise  Thank you

## 2019-01-10 ENCOUNTER — OFFICE VISIT (OUTPATIENT)
Dept: INTERNAL MEDICINE | Facility: CLINIC | Age: 59
End: 2019-01-10
Payer: MEDICARE

## 2019-01-10 ENCOUNTER — OUTPATIENT CASE MANAGEMENT (OUTPATIENT)
Dept: ADMINISTRATIVE | Facility: OTHER | Age: 59
End: 2019-01-10

## 2019-01-10 ENCOUNTER — LAB VISIT (OUTPATIENT)
Dept: LAB | Facility: HOSPITAL | Age: 59
End: 2019-01-10
Attending: INTERNAL MEDICINE
Payer: MEDICARE

## 2019-01-10 ENCOUNTER — TELEPHONE (OUTPATIENT)
Dept: INTERNAL MEDICINE | Facility: CLINIC | Age: 59
End: 2019-01-10

## 2019-01-10 VITALS
WEIGHT: 171.94 LBS | DIASTOLIC BLOOD PRESSURE: 62 MMHG | BODY MASS INDEX: 30.46 KG/M2 | HEIGHT: 63 IN | SYSTOLIC BLOOD PRESSURE: 110 MMHG

## 2019-01-10 DIAGNOSIS — I10 HYPERTENSION, UNSPECIFIED TYPE: ICD-10-CM

## 2019-01-10 DIAGNOSIS — E78.5 HYPERLIPIDEMIA, UNSPECIFIED HYPERLIPIDEMIA TYPE: ICD-10-CM

## 2019-01-10 DIAGNOSIS — Z00.00 ENCOUNTER FOR PREVENTIVE HEALTH EXAMINATION: Primary | ICD-10-CM

## 2019-01-10 DIAGNOSIS — I70.0 ATHEROSCLEROSIS OF AORTA: ICD-10-CM

## 2019-01-10 DIAGNOSIS — M06.9 RHEUMATOID ARTHRITIS INVOLVING MULTIPLE SITES, UNSPECIFIED RHEUMATOID FACTOR PRESENCE: ICD-10-CM

## 2019-01-10 DIAGNOSIS — I15.0 RENOVASCULAR HYPERTENSION: Chronic | ICD-10-CM

## 2019-01-10 DIAGNOSIS — G81.14 LEFT SPASTIC HEMIPARESIS: ICD-10-CM

## 2019-01-10 DIAGNOSIS — Z13.31 POSITIVE DEPRESSION SCREENING: ICD-10-CM

## 2019-01-10 DIAGNOSIS — I51.89 LEFT VENTRICULAR DIASTOLIC DYSFUNCTION WITH PRESERVED SYSTOLIC FUNCTION: ICD-10-CM

## 2019-01-10 DIAGNOSIS — E11.9 TYPE 2 DIABETES MELLITUS WITHOUT COMPLICATION, WITHOUT LONG-TERM CURRENT USE OF INSULIN: ICD-10-CM

## 2019-01-10 DIAGNOSIS — N18.6 ANEMIA IN ESRD (END-STAGE RENAL DISEASE): ICD-10-CM

## 2019-01-10 DIAGNOSIS — D63.1 ANEMIA IN ESRD (END-STAGE RENAL DISEASE): ICD-10-CM

## 2019-01-10 DIAGNOSIS — E53.8 FOLIC ACID DEFICIENCY: ICD-10-CM

## 2019-01-10 DIAGNOSIS — Z12.11 COLON CANCER SCREENING: ICD-10-CM

## 2019-01-10 DIAGNOSIS — G40.209 PARTIAL SYMPTOMATIC EPILEPSY WITH COMPLEX PARTIAL SEIZURES, NOT INTRACTABLE, WITHOUT STATUS EPILEPTICUS: ICD-10-CM

## 2019-01-10 DIAGNOSIS — I10 ESSENTIAL HYPERTENSION: ICD-10-CM

## 2019-01-10 DIAGNOSIS — G47.33 OSA (OBSTRUCTIVE SLEEP APNEA): ICD-10-CM

## 2019-01-10 DIAGNOSIS — Z99.2 CONTROLLED TYPE 2 DIABETES MELLITUS WITH CHRONIC KIDNEY DISEASE ON CHRONIC DIALYSIS, WITHOUT LONG-TERM CURRENT USE OF INSULIN: ICD-10-CM

## 2019-01-10 DIAGNOSIS — I61.9 HEMORRHAGIC CEREBROVASCULAR ACCIDENT (CVA): ICD-10-CM

## 2019-01-10 DIAGNOSIS — F41.8 MIXED ANXIETY AND DEPRESSIVE DISORDER: ICD-10-CM

## 2019-01-10 DIAGNOSIS — N18.6 CONTROLLED TYPE 2 DIABETES MELLITUS WITH CHRONIC KIDNEY DISEASE ON CHRONIC DIALYSIS, WITHOUT LONG-TERM CURRENT USE OF INSULIN: ICD-10-CM

## 2019-01-10 DIAGNOSIS — E11.22 CONTROLLED TYPE 2 DIABETES MELLITUS WITH CHRONIC KIDNEY DISEASE ON CHRONIC DIALYSIS, WITHOUT LONG-TERM CURRENT USE OF INSULIN: ICD-10-CM

## 2019-01-10 DIAGNOSIS — I27.20 PULMONARY HYPERTENSION: ICD-10-CM

## 2019-01-10 DIAGNOSIS — N18.6 ESRD (END STAGE RENAL DISEASE): ICD-10-CM

## 2019-01-10 DIAGNOSIS — D86.9 SARCOIDOSIS: Chronic | ICD-10-CM

## 2019-01-10 DIAGNOSIS — E55.9 VITAMIN D DEFICIENCY: ICD-10-CM

## 2019-01-10 LAB
BASOPHILS # BLD AUTO: 0.06 K/UL
BASOPHILS NFR BLD: 1.3 %
DIFFERENTIAL METHOD: ABNORMAL
EOSINOPHIL # BLD AUTO: 0.1 K/UL
EOSINOPHIL NFR BLD: 2.1 %
ERYTHROCYTE [DISTWIDTH] IN BLOOD BY AUTOMATED COUNT: 14.8 %
HCT VFR BLD AUTO: 45.8 %
HGB BLD-MCNC: 13.9 G/DL
LYMPHOCYTES # BLD AUTO: 1.3 K/UL
LYMPHOCYTES NFR BLD: 27.3 %
MCH RBC QN AUTO: 31.2 PG
MCHC RBC AUTO-ENTMCNC: 30.3 G/DL
MCV RBC AUTO: 103 FL
MONOCYTES # BLD AUTO: 0.5 K/UL
MONOCYTES NFR BLD: 10.3 %
NEUTROPHILS # BLD AUTO: 2.7 K/UL
NEUTROPHILS NFR BLD: 57.3 %
PLATELET # BLD AUTO: 115 K/UL
PMV BLD AUTO: 10.4 FL
RBC # BLD AUTO: 4.46 M/UL
WBC # BLD AUTO: 4.66 K/UL

## 2019-01-10 PROCEDURE — 99999 PR PBB SHADOW E&M-EST. PATIENT-LVL V: ICD-10-PCS | Mod: PBBFAC,HCNC,, | Performed by: NURSE PRACTITIONER

## 2019-01-10 PROCEDURE — 36415 COLL VENOUS BLD VENIPUNCTURE: CPT | Mod: HCNC

## 2019-01-10 PROCEDURE — 99499 UNLISTED E&M SERVICE: CPT | Mod: HCNC,S$GLB,, | Performed by: NURSE PRACTITIONER

## 2019-01-10 PROCEDURE — G0439 PR MEDICARE ANNUAL WELLNESS SUBSEQUENT VISIT: ICD-10-PCS | Mod: HCNC,S$GLB,, | Performed by: NURSE PRACTITIONER

## 2019-01-10 PROCEDURE — 3044F HG A1C LEVEL LT 7.0%: CPT | Mod: CPTII,HCNC,S$GLB, | Performed by: NURSE PRACTITIONER

## 2019-01-10 PROCEDURE — 99999 PR PBB SHADOW E&M-EST. PATIENT-LVL V: CPT | Mod: PBBFAC,HCNC,, | Performed by: NURSE PRACTITIONER

## 2019-01-10 PROCEDURE — 99499 RISK ADDL DX/OHS AUDIT: ICD-10-PCS | Mod: HCNC,S$GLB,, | Performed by: NURSE PRACTITIONER

## 2019-01-10 PROCEDURE — 85025 COMPLETE CBC W/AUTO DIFF WBC: CPT | Mod: HCNC

## 2019-01-10 PROCEDURE — 3044F PR MOST RECENT HEMOGLOBIN A1C LEVEL <7.0%: ICD-10-PCS | Mod: CPTII,HCNC,S$GLB, | Performed by: NURSE PRACTITIONER

## 2019-01-10 PROCEDURE — G0439 PPPS, SUBSEQ VISIT: HCPCS | Mod: HCNC,S$GLB,, | Performed by: NURSE PRACTITIONER

## 2019-01-10 NOTE — Clinical Note
Good afternoon Dr Muniz,I had the pleasure of seeing Ms Ayala for a health assessment. She was very tearful during the visit. She denies HI and SI but her depression screen was positive. She was here with her daughter who does seem to be experiencing some caregiver burn out. They have no other resources that help with daily cares. I've consulted with case management, hopefully they can assist more. Perhaps an adult day center would be helpful, I know that transportation is an issue. They rely on public transportation.In addition, she is requesting a refill on Lyrica and Xanax. She is scheduled to see you Tuesday.Thank Liz

## 2019-01-10 NOTE — PROGRESS NOTES
Thank you for the referral. The following patient has been assigned to Patty Vazquez RN with Outpatient Complex Care Management for high risk screening.    Reason for referral:  Hemorrhagic cerebrovascular accident (CVA)  Partial symptomatic epilepsy with complex partial seizures, not intractable, without status epilepticus  Left spastic hemiparesis  Patient needs maximum assistance with ADLs  Left sided paralysis    Please contact [facility] at [phone] with any questions.    Thank you,    Maria M Rodriguez, [facility]  Outpatient Care Mgmt.  [phone]

## 2019-01-10 NOTE — TELEPHONE ENCOUNTER
Received a call from Zoë with Internal Medicine lab, she is unable to draw any blood from the patient. She states that the patient only has one are that they can draw from and the arm is cold. They were only able to get enough blood from the patient for the CBC, all other orders will be canceled.

## 2019-01-10 NOTE — TELEPHONE ENCOUNTER
Spoke with eRina (Patient's Daughter) she states that they won't be able to come over to this location due to transportation, they are on the bus. I advised the patient to have the labs drawn at her appt on the 15th.

## 2019-01-11 PROBLEM — I70.0 ATHEROSCLEROSIS OF AORTA: Status: ACTIVE | Noted: 2019-01-11

## 2019-01-11 PROBLEM — I27.9 PULMONARY HEART DISEASE: Status: ACTIVE | Noted: 2019-01-11

## 2019-01-11 PROBLEM — Z13.31 POSITIVE DEPRESSION SCREENING: Status: ACTIVE | Noted: 2019-01-11

## 2019-01-11 NOTE — PATIENT INSTRUCTIONS
Counseling and Referral of Other Preventative  (Italic type indicates deductible and co-insurance are waived)    Patient Name: Lucy Perez  Today's Date: 1/11/2019    Health Maintenance       Date Due Completion Date    TETANUS VACCINE 08/24/1976 ---    Pneumococcal Vaccine (Medium Risk) (1 of 1 - PPSV23) 08/24/1977 ---    Foot Exam 03/29/2018 3/29/2017 (Done)    Override on 3/29/2017: Done    Override on 10/22/2015: Done    Eye Exam 03/29/2018 3/29/2017    Influenza Vaccine 08/01/2018 11/18/2015    Zoster Vaccine 08/24/2018 ---    Hemoglobin A1c 03/20/2019 9/20/2018    Lipid Panel 08/31/2019 8/31/2018    Mammogram 10/02/2019 10/2/2018    Colonoscopy 02/13/2022 2/13/2017        Orders Placed This Encounter   Procedures    Fecal Immunochemical Test (iFOBT)    Ambulatory referral to Outpatient Case Management     The following information is provided to all patients.  This information is to help you find resources for any of the problems found today that may be affecting your health:                Living healthy guide: www.[facility].[place].gov      Understanding Diabetes: www.diabetes.org      Eating healthy: www.cdc.gov/healthyweight      CDC home safety checklist: www.cdc.gov/steadi/patient.html      Agency on Aging: www.goea.[place].[facility]      Alcoholics anonymous (AA): www.aa.org      Physical Activity: www.alban.nih.gov/jh0jjhf      Tobacco use: www.quitwithusla.org

## 2019-01-11 NOTE — PROGRESS NOTES
"Lucy Perez presented for a  Medicare AWV and comprehensive Health Risk Assessment today. The following components were reviewed and updated:    · Medical history  · Family History  · Social history  · Allergies and Current Medications  · Health Risk Assessment  · Health Maintenance  · Care Team     ** See Completed Assessments for Annual Wellness Visit within the encounter summary.**       The following assessments were completed:  · Living Situation  · CAGE  · Depression Screening  · Timed Get Up and Go  · Whisper Test  · Cognitive Function Screening*CVA  · Nutrition Screening  · ADL Screening  · PAQ Screening    Vitals:    01/10/19 1118   BP: 110/62   Weight: 78 kg (171 lb 15.3 oz)   Height: 5' 3" (1.6 m)     Body mass index is 30.46 kg/m².  Physical Exam   Constitutional: She is oriented to person, place, and time. She appears well-developed.   obese   HENT:   Head: Normocephalic and atraumatic.   Nose: Nose normal.   Eyes: Conjunctivae and EOM are normal.   Cardiovascular: Normal rate and regular rhythm.   No murmur heard.  Pulmonary/Chest: Effort normal and breath sounds normal.   Musculoskeletal: Normal range of motion.   Neurological: She is alert and oriented to person, place, and time.   Skin: Skin is warm and dry.   Psychiatric: Her behavior is normal. Judgment and thought content normal. Cognition and memory are impaired. She exhibits a depressed mood.   tearful   Nursing note and vitals reviewed.      Present with daughter  Diagnoses and health risks identified today and associated recommendations/orders:    1. Encounter for preventive health examination  Assessment performed. Health maintenance updated. Chart review completed.  Patient's daughter will assist in scheduling podiatry and optometry visit. Transportation is an issue.  Reports having vaccines completed at Dialysis center;advised to bring records.     2. Hemorrhagic cerebrovascular accident (CVA)  - Ambulatory referral to Outpatient Case " Management    3. ESRD (end stage renal disease)  Chronic. Continue current regimen as instructed. Followed by Nephrology and Outside Dialysis clinic.    4. Pulmonary hypertension  Noted on 2 D echo. Chronic. Continue current regimen. Followed by Cardiology.    5. Atherosclerosis of aorta  Noted on imaging. Stable with blood pressure control and statin therapy. Followed by PCP.    6. Partial symptomatic epilepsy with complex partial seizures, not intractable, without status epilepticus  - Ambulatory referral to Outpatient Case Management    7. Left spastic hemiparesis  - Ambulatory referral to Outpatient Case Management    8. Rheumatoid arthritis involving multiple sites, unspecified rheumatoid factor presence  Chronic pain endorsed.  Stable.  Followed by PCP.    9. Controlled type 2 diabetes mellitus with chronic kidney disease on chronic dialysis, without long-term current use of insulin  Chronic. Continue current regimen as instructed. Followed by Endocrinology.  Component      Latest Ref Rng & Units 8/31/2018   Hemoglobin A1C External      4.0 - 5.6 % 5.7 (H)   Estimated Avg Glucose      68 - 131 mg/dL 117     Component      Latest Ref Rng & Units 12/8/2018             Creatinine      0.5 - 1.4 mg/dL 4.2 (H)   Chronic. Diabetes controlled. Followed by Dialysis for ESRD.    10. Anemia in ESRD (end-stage renal disease)  Component      Latest Ref Rng & Units 12/8/2018             Creatinine      0.5 - 1.4 mg/dL 4.2 (H)   Chronic. Continue current regimen. Followed by Dialysis Clinic.    11. Renovascular hypertension  Chronic. Continue current regimen. Followed by Dialysis Clinic.    12. Left ventricular diastolic dysfunction with preserved systolic function  Stable. Noted on imaging. Followed by PCP.    13. THERESA (obstructive sleep apnea)  Chronic. Stable. Followed by Sleep Medicine.    14. Sarcoidosis  Chronic. Continue current regimen. Followed by pulmonology.    15. Mixed anxiety and depressive disorder  Chronic.  Positive depression screening. Not at goal. Very tearful today. Feels like a burden on family.  PCP visit on Tuesday, will send note.    16. Essential hypertension  Chronic. Stable on current regimen. Followed by PCP.    17. Hyperlipidemia, unspecified hyperlipidemia type  Chronic. Stable on current regimen. Followed by PCP.    18. Folic acid deficiency  Chronic. Stable with current regimen. Followed by PCP.    19. Vitamin D deficiency  Chronic. Stable with current regimen. Followed by PCP.    20. Colon cancer screening  - Fecal Immunochemical Test (iFOBT); Future    21. Positive depression screening  Discussed this at length. No SI or HI. Patient reports that she feels that she is a burden on her family. Her inability to provide self cares affects her mood. She does not have sitters or home health services. Much of the patient's care is provided by her adult daughter. She is not currently taking a daily antidepressant. She is scheduled to see her PCP this Tuesday.      Provided Lucy with a 5-10 year written screening schedule and personal prevention plan. Recommendations were developed using the USPSTF age appropriate recommendations. Education, counseling, and referrals were provided as needed. After Visit Summary printed and given to patient which includes a list of additional screenings\tests needed.    Follow-up for Annual Wellness Visit in 1 year, follow up with Primary Care Provider as instructed, ;sooner if prob.    MARISELA Aguilera

## 2019-01-15 ENCOUNTER — LAB VISIT (OUTPATIENT)
Dept: LAB | Facility: HOSPITAL | Age: 59
End: 2019-01-15
Attending: INTERNAL MEDICINE
Payer: MEDICARE

## 2019-01-15 ENCOUNTER — OUTPATIENT CASE MANAGEMENT (OUTPATIENT)
Dept: ADMINISTRATIVE | Facility: OTHER | Age: 59
End: 2019-01-15

## 2019-01-15 ENCOUNTER — OFFICE VISIT (OUTPATIENT)
Dept: INTERNAL MEDICINE | Facility: CLINIC | Age: 59
End: 2019-01-15
Payer: MEDICARE

## 2019-01-15 VITALS
TEMPERATURE: 98 F | SYSTOLIC BLOOD PRESSURE: 118 MMHG | WEIGHT: 171.94 LBS | HEIGHT: 63 IN | BODY MASS INDEX: 30.46 KG/M2 | DIASTOLIC BLOOD PRESSURE: 70 MMHG | HEART RATE: 79 BPM

## 2019-01-15 DIAGNOSIS — N18.6 CONTROLLED TYPE 2 DIABETES MELLITUS WITH CHRONIC KIDNEY DISEASE ON CHRONIC DIALYSIS, WITHOUT LONG-TERM CURRENT USE OF INSULIN: ICD-10-CM

## 2019-01-15 DIAGNOSIS — Z99.2 CONTROLLED TYPE 2 DIABETES MELLITUS WITH CHRONIC KIDNEY DISEASE ON CHRONIC DIALYSIS, WITHOUT LONG-TERM CURRENT USE OF INSULIN: ICD-10-CM

## 2019-01-15 DIAGNOSIS — F32.A DEPRESSION, UNSPECIFIED DEPRESSION TYPE: ICD-10-CM

## 2019-01-15 DIAGNOSIS — I10 ESSENTIAL HYPERTENSION: ICD-10-CM

## 2019-01-15 DIAGNOSIS — M62.838 MUSCLE SPASTICITY: ICD-10-CM

## 2019-01-15 DIAGNOSIS — I15.0 RENOVASCULAR HYPERTENSION: Primary | Chronic | ICD-10-CM

## 2019-01-15 DIAGNOSIS — E11.22 CONTROLLED TYPE 2 DIABETES MELLITUS WITH CHRONIC KIDNEY DISEASE ON CHRONIC DIALYSIS, WITHOUT LONG-TERM CURRENT USE OF INSULIN: ICD-10-CM

## 2019-01-15 DIAGNOSIS — L89.152 PRESSURE INJURY OF SACRAL REGION, STAGE 2: ICD-10-CM

## 2019-01-15 DIAGNOSIS — N18.6 ESRD (END STAGE RENAL DISEASE): ICD-10-CM

## 2019-01-15 DIAGNOSIS — D64.9 ANEMIA, UNSPECIFIED TYPE: ICD-10-CM

## 2019-01-15 LAB
ALBUMIN SERPL BCP-MCNC: 3.9 G/DL
ALP SERPL-CCNC: 69 U/L
ALT SERPL W/O P-5'-P-CCNC: 19 U/L
ANION GAP SERPL CALC-SCNC: 13 MMOL/L
AST SERPL-CCNC: 19 U/L
BILIRUB SERPL-MCNC: 0.9 MG/DL
BUN SERPL-MCNC: 47 MG/DL
CALCIUM SERPL-MCNC: 9.8 MG/DL
CHLORIDE SERPL-SCNC: 92 MMOL/L
CHOLEST SERPL-MCNC: 190 MG/DL
CHOLEST/HDLC SERPL: 3.3 {RATIO}
CO2 SERPL-SCNC: 33 MMOL/L
CREAT SERPL-MCNC: 5.8 MG/DL
EST. GFR  (AFRICAN AMERICAN): 8.4 ML/MIN/1.73 M^2
EST. GFR  (NON AFRICAN AMERICAN): 7.3 ML/MIN/1.73 M^2
ESTIMATED AVG GLUCOSE: 197 MG/DL
FERRITIN SERPL-MCNC: 761 NG/ML
GLUCOSE SERPL-MCNC: 259 MG/DL
HBA1C MFR BLD HPLC: 8.5 %
HDLC SERPL-MCNC: 57 MG/DL
HDLC SERPL: 30 %
LDLC SERPL CALC-MCNC: 113.4 MG/DL
NONHDLC SERPL-MCNC: 133 MG/DL
POTASSIUM SERPL-SCNC: 4.2 MMOL/L
PROT SERPL-MCNC: 8.1 G/DL
SODIUM SERPL-SCNC: 138 MMOL/L
TRIGL SERPL-MCNC: 98 MG/DL
TSH SERPL DL<=0.005 MIU/L-ACNC: 1.3 UIU/ML

## 2019-01-15 PROCEDURE — 99215 OFFICE O/P EST HI 40 MIN: CPT | Mod: HCNC,S$GLB,, | Performed by: INTERNAL MEDICINE

## 2019-01-15 PROCEDURE — 99999 PR PBB SHADOW E&M-EST. PATIENT-LVL V: CPT | Mod: PBBFAC,HCNC,, | Performed by: INTERNAL MEDICINE

## 2019-01-15 PROCEDURE — 82728 ASSAY OF FERRITIN: CPT | Mod: HCNC

## 2019-01-15 PROCEDURE — 83036 HEMOGLOBIN GLYCOSYLATED A1C: CPT | Mod: HCNC

## 2019-01-15 PROCEDURE — 3045F PR MOST RECENT HEMOGLOBIN A1C LEVEL 7.0-9.0%: ICD-10-PCS | Mod: CPTII,HCNC,S$GLB, | Performed by: INTERNAL MEDICINE

## 2019-01-15 PROCEDURE — 80053 COMPREHEN METABOLIC PANEL: CPT | Mod: HCNC

## 2019-01-15 PROCEDURE — 3008F BODY MASS INDEX DOCD: CPT | Mod: CPTII,HCNC,S$GLB, | Performed by: INTERNAL MEDICINE

## 2019-01-15 PROCEDURE — 99215 PR OFFICE/OUTPT VISIT, EST, LEVL V, 40-54 MIN: ICD-10-PCS | Mod: HCNC,S$GLB,, | Performed by: INTERNAL MEDICINE

## 2019-01-15 PROCEDURE — 36415 COLL VENOUS BLD VENIPUNCTURE: CPT | Mod: HCNC,PO

## 2019-01-15 PROCEDURE — 99999 PR PBB SHADOW E&M-EST. PATIENT-LVL V: ICD-10-PCS | Mod: PBBFAC,HCNC,, | Performed by: INTERNAL MEDICINE

## 2019-01-15 PROCEDURE — 3045F PR MOST RECENT HEMOGLOBIN A1C LEVEL 7.0-9.0%: CPT | Mod: CPTII,HCNC,S$GLB, | Performed by: INTERNAL MEDICINE

## 2019-01-15 PROCEDURE — 3008F PR BODY MASS INDEX (BMI) DOCUMENTED: ICD-10-PCS | Mod: CPTII,HCNC,S$GLB, | Performed by: INTERNAL MEDICINE

## 2019-01-15 PROCEDURE — 80061 LIPID PANEL: CPT | Mod: HCNC

## 2019-01-15 PROCEDURE — 84443 ASSAY THYROID STIM HORMONE: CPT | Mod: HCNC

## 2019-01-15 RX ORDER — BUPROPION HYDROCHLORIDE 150 MG/1
150 TABLET ORAL DAILY
Qty: 30 TABLET | Refills: 4 | Status: SHIPPED | OUTPATIENT
Start: 2019-01-15 | End: 2019-05-08

## 2019-01-15 RX ORDER — PREGABALIN 25 MG/1
CAPSULE ORAL
Qty: 90 CAPSULE | Refills: 4 | Status: ON HOLD | OUTPATIENT
Start: 2019-01-15 | End: 2019-07-11 | Stop reason: SDUPTHER

## 2019-01-15 RX ORDER — PREGABALIN 25 MG/1
CAPSULE ORAL
Qty: 270 CAPSULE | Refills: 2 | Status: SHIPPED | OUTPATIENT
Start: 2019-01-15 | End: 2019-01-15 | Stop reason: SDUPTHER

## 2019-01-15 RX ORDER — BUPROPION HYDROCHLORIDE 150 MG/1
150 TABLET ORAL DAILY
Qty: 30 TABLET | Refills: 4 | Status: SHIPPED | OUTPATIENT
Start: 2019-01-15 | End: 2019-01-15 | Stop reason: SDUPTHER

## 2019-01-15 NOTE — PATIENT INSTRUCTIONS
Medication Notes:  #1-TRazodone 100mg-decrease to 1/2 tab x 1 week then stop  #2-Start Wellbutrin XL 150mg at 1 /day for Depression  #3-Restart Lyrica 25 mg at 1 in AM and 2 at Bedtime

## 2019-01-15 NOTE — PROGRESS NOTES
Ms. Perez is a 58-year-old female, known to myself, who presents to clinic   yesterday January 15th at which time clinic billing was performed.  Her note is   being dictated today, January 16th.    CHIEF COMPLAINT:  Followup diabetes and depression.    HISTORY OF PRESENT ILLNESS:  Ms. Perez presents with daughter Reina for   followup of the above.  She actually is status post a recent health risk   assessment for her insurance company and was noted to have rather significant   depression symptoms.  She does complain of depression in the office.  She is   frustrated with not being able to use her left arm or being able to stand.  She   loves to be able to walk with a walker, but because of the left hemiparesis this   not really been able to accomplish this.  She has taken the trazodone at   bedtime, but cristobal Huang notes is not helpful at all with sleep.  She is   not on anything else for depression for many years, prior to her stroke, she was   on Wellbutrin due to chronic history of depression.  She is following in her   [facility] with Dr. Mccoy, her nephrologist.  She notes that she is   presently attending dialysis on Mondays, Wednesdays and Fridays, so she is here   to come in on Tuesday or Thursday.  She also notes that at this point, she is   using her hospital bed, but is having a lot of discomfort from the mattress.    The mattress seems to be very old and worn out.  It actually got an indentation   in the center of it, feels that she may need a new mattress, does have an area   of skin breakdown in the sacrum, which she notes is tender.  She tries not to   sit on her tailbone on a regular basis, although it is difficult.  Review of   medication list shows that she has run out of her Lyrica at some point.  She is   scheduled to see Dr. Pardo, her Physical Medicine Rehabilitation physician   tomorrow and I have encouraged her to keep this appointment.  She notes that she   is  tightening up a little bit on the left side, which is the hemiparetic side,   but wonders if this could be from running out of the Lyrica.  She did in fact   have blood work done to follow up on an elevated glucose while in the Emergency   Room, but was only able to have a CBC drawn, needs to have this blood work done   to follow up her diabetes as well.  At this point, she is not on diabetic   therapy or insulin.    PAST MEDICAL, SURGICAL, SOCIAL HISTORY:  Please see as thoroughly stated in EPIC   chart, which has been reviewed.    PHYSICAL EXAMINATION:  VITAL SIGNS:  Weight 171 pounds, blood pressure 118/70, pulse 79, temperature 98   degrees.  GENERAL:  The patient looks comfortable.  Recheck blood pressure per MD on the   left side is 120/70.  Right upper extremity does show access in place for the   patient's dialysis weak thrill noted.  In general the patient looks sad but in   no acute distress.  She does become teary in the office.  LUNGS:  Clear bilaterally.  HEART:  Regular rate and rhythm without arrhythmias, murmurs or gallops.  ABDOMEN:  Soft, nontender.  EXTREMITIES:  Negative for edema.  Bilateral feet show normal pulses bilaterally   acceptable nail care.  No calluses or ulcerations.  SKIN:  Otherwise does show a stage II, mild 2 x 1 cm in diameter decubitus, the   right presacral area.  No drainage, no infection, very superficial.    WORKUP:  Lab work pending.    ASSESSMENT AND PLAN:  1.  Depression.  A. We will restart Wellbutrin  mg x1 month and after this increased to XL   300 mg if the patient tolerates this and blood pressure does well.  B. We will slowly taper off the trazodone with the patient to go to half of a   100 mg tablet x1 week, then to discontinue such.  C. Return to clinic in two to three months for followup or see sooner if needed.  2.  Sacral pain with stage II decubitus.  A. We will request new hospital bed mattress, which the patient desperately   needs.  B. I have  recommended the patient try and use a doughnut cushion during the day   when she is sitting in wheelchair.  C. Continue with present care, seems to be allowing area to heal.  3.  Diabetes mellitus type 2.  A. Check fasting lab work to include hemoglobin A1c with phone review.  4.  Essential hypertension, under acceptable control.  A. Continue on present therapy, which includes Lasix 40 mg one a day and Coreg   25 mg b.i.d.  5.  End-stage renal disease with the patient on dialysis three days a week,   Mondays, Wednesdays and Fridays.  A. We will follow along as per the patient's nephrologist, Dr. Mccoy at the   [facility].  6.  Status post CVA with left-sided hemiparesis and some tightness developing in   the left side.  A. We will restart Lyrica at 25 mg one cap in the a.m., two caps at bedtime.  B. I have encouraged the patient to keep appointment with Dr. Abernathy in Physical   Medicine Rehab in tomorrow.  7.  Health maintenance.  A. Phone review lab and return the patient to clinic in two to three months for   followup.  Address other health maintenance issues then, go from there or see   the patient sooner if needed.    Of note, we will bill this appointment based on time spent, which is greater   than one hour.      FMS/HN  dd: 01/16/2019 16:16:49 (CST)  td: 01/17/2019 04:28:47 (CST)  Doc ID   #[id number]  Job ID #[id number]    CC:     This office note has been dictated.

## 2019-01-15 NOTE — PROGRESS NOTES
1/15/19  Summary:  1st Attempt to complete initial assessment for Outpatient Care Management; left message requesting return call.     Interventions:  None.     Plan:  Make 2nd attempt to complete initial assessment for OPCM.

## 2019-01-16 ENCOUNTER — TELEPHONE (OUTPATIENT)
Dept: INTERNAL MEDICINE | Facility: CLINIC | Age: 59
End: 2019-01-16

## 2019-01-16 NOTE — TELEPHONE ENCOUNTER
----- Message from Adrian Morley sent at 1/16/2019 11:45 AM CST -----  Contact: [facility], [place] [phone]  [facility] calling stating an order was sent, but is still needing the patient Insurance information.    Requesting for a response back.    Please call an advise  Thank you

## 2019-01-17 ENCOUNTER — TELEPHONE (OUTPATIENT)
Dept: INTERNAL MEDICINE | Facility: CLINIC | Age: 59
End: 2019-01-17

## 2019-01-17 ENCOUNTER — OFFICE VISIT (OUTPATIENT)
Dept: PHYSICAL MEDICINE AND REHAB | Facility: CLINIC | Age: 59
End: 2019-01-17
Payer: MEDICARE

## 2019-01-17 ENCOUNTER — OUTPATIENT CASE MANAGEMENT (OUTPATIENT)
Dept: ADMINISTRATIVE | Facility: OTHER | Age: 59
End: 2019-01-17

## 2019-01-17 DIAGNOSIS — Z79.4 TYPE 2 DIABETES MELLITUS WITH CHRONIC KIDNEY DISEASE ON CHRONIC DIALYSIS, WITH LONG-TERM CURRENT USE OF INSULIN: Primary | ICD-10-CM

## 2019-01-17 DIAGNOSIS — Z99.2 TYPE 2 DIABETES MELLITUS WITH CHRONIC KIDNEY DISEASE ON CHRONIC DIALYSIS, WITH LONG-TERM CURRENT USE OF INSULIN: Primary | ICD-10-CM

## 2019-01-17 DIAGNOSIS — N18.6 TYPE 2 DIABETES MELLITUS WITH CHRONIC KIDNEY DISEASE ON CHRONIC DIALYSIS, WITH LONG-TERM CURRENT USE OF INSULIN: Primary | ICD-10-CM

## 2019-01-17 DIAGNOSIS — I63.9 CEREBROVASCULAR ACCIDENT (CVA), UNSPECIFIED MECHANISM: Primary | ICD-10-CM

## 2019-01-17 DIAGNOSIS — I10 ESSENTIAL HYPERTENSION: ICD-10-CM

## 2019-01-17 DIAGNOSIS — E11.22 TYPE 2 DIABETES MELLITUS WITH CHRONIC KIDNEY DISEASE ON CHRONIC DIALYSIS, WITH LONG-TERM CURRENT USE OF INSULIN: Primary | ICD-10-CM

## 2019-01-17 PROCEDURE — 99499 UNLISTED E&M SERVICE: CPT | Mod: HCNC,S$GLB,, | Performed by: PHYSICAL MEDICINE & REHABILITATION

## 2019-01-17 PROCEDURE — 99213 OFFICE O/P EST LOW 20 MIN: CPT | Mod: HCNC,S$GLB,, | Performed by: PHYSICAL MEDICINE & REHABILITATION

## 2019-01-17 PROCEDURE — 99999 PR PBB SHADOW E&M-EST. PATIENT-LVL II: ICD-10-PCS | Mod: PBBFAC,HCNC,, | Performed by: PHYSICAL MEDICINE & REHABILITATION

## 2019-01-17 PROCEDURE — 99499 RISK ADDL DX/OHS AUDIT: ICD-10-PCS | Mod: HCNC,S$GLB,, | Performed by: PHYSICAL MEDICINE & REHABILITATION

## 2019-01-17 PROCEDURE — 99999 PR PBB SHADOW E&M-EST. PATIENT-LVL II: CPT | Mod: PBBFAC,HCNC,, | Performed by: PHYSICAL MEDICINE & REHABILITATION

## 2019-01-17 PROCEDURE — 99213 PR OFFICE/OUTPT VISIT, EST, LEVL III, 20-29 MIN: ICD-10-PCS | Mod: HCNC,S$GLB,, | Performed by: PHYSICAL MEDICINE & REHABILITATION

## 2019-01-17 RX ORDER — INSULIN GLARGINE 100 [IU]/ML
INJECTION, SOLUTION SUBCUTANEOUS
Qty: 10 ML | Refills: 6 | Status: ON HOLD | OUTPATIENT
Start: 2019-01-17 | End: 2019-07-11 | Stop reason: HOSPADM

## 2019-01-17 RX ORDER — DANTROLENE SODIUM 50 MG/1
50 CAPSULE ORAL 3 TIMES DAILY
Qty: 270 CAPSULE | Refills: 3 | Status: ON HOLD | OUTPATIENT
Start: 2019-01-17 | End: 2019-07-11 | Stop reason: HOSPADM

## 2019-01-17 NOTE — TELEPHONE ENCOUNTER
----- Message from Patty Vazquez RN sent at 1/17/2019 12:57 PM CST -----  Contact: KARINA De Leon Dr./Staff:    I completed an initial assessment for Outpatient Care Management services today by phone with pt and spouse.    1)  Pt's PHQ9= 15 as per protocol--- If score is > or=3: Physician should be notified and use their clinical judgement about treatment based on patients duration of symptoms and functional impairment. The score also warrants a referral to the Outpatient Care Management . Pt denies SI or a plan---however feels like a burden to her family and thinks they would be better off if she were dead.   Our LCSW has been assigned to pt and we will follow up on pt's response to new depression meds prescribed 1/15 by PCP.     2) [facility] DME reports pt eligible for a replacement hosp bed mattress with MD order. Pt to benefit from a wheelchair gel cushion and is eligible.  If in agreement-- please fax orders to [facility] FAX:  [phone].    Please advise.   Thank you,  ROSSY De Leon, RN, CCM [facility] Outpatient Complex Case Management  [email or web address]  TEL:  [phone]

## 2019-01-17 NOTE — LETTER
January 17, 2019    Lucy Perez  [address]  [facility] [address]             [facility]  [address]  [facility] [address] Dear Mrs. Lucy Perze:    It was a pleasure to speak with you both, Mrs. Ayala & Mr. Hdz over the phone today.   I am including the following education material that we can review gradually:  Diabetes: Exams & Tests, Signs & Symptoms of High/Low Blood Sugars, Blood sugar logs to write down readings, Discharge Instructions:  Eating a High-Fiber Diet, Constipation, Pressure Injury, Reducing A Patient's Risk for Pressure Ulcers/Common Sites, Preventing Falls, [employer] Gold Over-the-Counter Benefit information and 2019 Catalog and important phone numbers.      [facility] Main Office- TEL:  [phone]     Office Hours Mon-Fri, 8 am - 4:30 pm   [facility] On Call, 24/7 Nurse Help Line (non emergent)- TEL:  [phone]    [employer] Michael- Over-the-Counter Benefit-- TEL:  [phone]  Marta Samuel, [employer] Representative-- TEL:  [phone], ext [phone] can help to change [employer] Gold plan to reflect either Diabetes or Heart Disease or both in order to lower co pay costs for medical appointments.   [employer] Sneakers Exercise- Zach Cisneros [facility]  [employer] Michael- Well Dine -TEL:  [phone].  Call once home within 30 days after a discharge from an Inpatient Stay at the Hospital, Rehab or Skilled Facility. 10 meals are delivered to your home from [employer]. All meals are Heart Healthy, Low Sodium.   [employer]- Transportation Reservation-- TEL:  [phone]  Mon-Fri, 8 am-5 pm, 3 business days notice required.     I look forward to working with you and your family to connect you to resources to help in you healthcare management.   Please call with any questions or concerns.     Sincerely,      Patty Vazquez RN

## 2019-01-17 NOTE — PROGRESS NOTES
"1/17/19  Summary:  Patient referred to OPCM for high risk. Spoke with patient & spouse telephonically. Explained OPCM program. Patient & spouse are in agreement to have OPCM assist & manage health issues. Completed initial screen/assessment/PHQ=15. Pt admits to feeling her family would be better off if she were dead--but denies existing plan to harm herself. Pt expresses worry over being a burden to her family Spouse & pt on speaker phone- spouse counters pt's statement, stating he wants to care for her, pt has been the c/gr for her family. Pt s/p PCP appt 1/15 w/A1C drawn=8.5 up from [facility] A1C 5.7%. Daughter was contacted by MD office to start Lantus 10 units SC every PM. Pt known to have depression but not treated, was started new on Wellbutrin, Lyrica and brief time on Trazadone. . Pt is AAOX3, sometimes forgetful to time/date, requires total assist with ADLs/IADLs since aneurysm w/CVA & left hemiparesis Aug 2016. Pt is bed/w/cbound, reported to have an old worn hosp bed mattress with indentation in center & a sacral Stg 2 decubitus per PCP notes. Spouse states the sacral area is "rough", dry but sore, appears to be healing. Spouse interested in getting a donut cushion or gel cushion for w/c & HD. Pt can bear wt RLE but with max assistance, has shaunna lift for BSC transfers. Pt wears diapers and h/o chronic constipation - a concern voiced by spouse. Pt feeds herself, is transported to HD per RTA Lift mostly and to other med appt by  spouse or daughter, Reina. An outside lift installed to facilitate entry/exit of home. Spouse states not completely sure about their disaster plan. Meds are managed per daughter- 7 day organizer filled and a timer in place to remind spouse to admin. Spouse refers KARINA BERMAN to Reina (living in separate household) for med rec. BGs are being checked at home.   Spouse asks for clarification on the physical location for today's appt to Physical Medicine with Dr. Monty Pardo. It is hoped " that a PT treatment plan may be initiated. Pt presently not receiving in home services. H/o [facility] which spouse would like to have unless OP PT recommended by Dr. Pardo. [facility] identify community support in pt's 24/7 care & OEP services.   Spouse is unfamiliar with HG benefits and interested to learn more.     Interventions:  ---Provided clarification to spouse with f/u call back -- that Dr. Pardo office remains at [facility] [address], [address]--register 1st on 4th floor.   ---Discussed decubitus prevention with rotation of position, propping with pillows, need for DME--- new hosp mattress or overlay mattress and w/c cushion.   ---Provided information on HG OTC Benefit and [employer] to help with HG Diabetes/Heart Plus plan for additional cost savings/benefits.   ---Referred to [facility] LCSW for the following identified barriers: Support, Financial, Disaster, Depression.  ---Called and spoke with [facility]--- TEL:  [phone]/FAX:  [phone] -- pt eligible for Replacement Hosp Bed Mattress (pt responsible for 20%= $22.42) and Gel Cushion for W/C.  ---In basket message to Dr. Muniz on 1).  PHQ9 score=15, with protocol----If score is > or=3: Physician should be notified and use their clinical judgement about treatment based on patients duration of symptoms and functional impairment. The score also warrants a referral to the Outpatient Care Management . [facility] LCSW being assigned.   2)  Order request for Replacement Hosp Bed Mattress and Gel Cushion for W/C.   ---Provided pt/spouse with contacts for RN CM and OOC then mailed the same plus contact for Marta [employer], 2019 catalog, HG OTC info &  education material, BG logs.   ---Called and spoke with daughter to complete Med Rec--- Reina records RN ANTONELLA number and says she will have to call back this PM after pt's med appt.     Plan:  Resources---DME-- f/u on Replacement Hosp Bed  Mattress & W/C Gel Cushion. F/u on Physical Med appt 1/17-- outcome--therapy plans. F/u on need for HH vs OP PT. F/u on LCSW assessment. Complete Med Rec with daughter. Coordinate with HD dietitian.   Decubitus -- Follow-up on receipt of mailed material. Continue teaching to prevent.   DM- Follow-up on receipt of mailed material. Continue teaching on DM mgmnt, BG checks/logs, s/s hypo/hyperglycemia.   Constipation-- Follow up phone call to patient.         Todays OPCM Self-Management Care Plan was developed with the patients/caregivers input and was based on identified barriers from todays assessment.  Goals were written today with the patient/caregiver and the patient has agreed to work towards these goals to improve his/her overall well-being. Patient verbalized understanding of the care plan, goals, and all of today's instructions. Encouraged patient/caregiver to communicate with his/her physician and health care team about health conditions and the treatment plan.  Provided my contact information today and encouraged patient/caregiver to call me with any questions as needed.

## 2019-01-17 NOTE — TELEPHONE ENCOUNTER
Spoke with Reina ruiz her Mom's Lab(1/15)-showed HgbA1C at 8.5%(last at 5.7%); Chemistry/Lipid ok.  I have recommended start of Lantus at 10 units SC in PM daily.  Family will monitor accuchecks daily.  Isaca, please schedule pt for fasting lab her at [place] 1 week prior to her RTC Appt.  Thanks

## 2019-01-18 ENCOUNTER — TELEPHONE (OUTPATIENT)
Dept: INTERNAL MEDICINE | Facility: CLINIC | Age: 59
End: 2019-01-18

## 2019-01-18 NOTE — TELEPHONE ENCOUNTER
Isaac, please fax the Hospital bed mattress script/order and the Wheelchair gel cushion order to [facility] as below requested. Thanks

## 2019-01-18 NOTE — TELEPHONE ENCOUNTER
----- Message from Patty Vazquez RN sent at 1/18/2019  9:54 AM CST -----  Contact: KARINA De Leon Ms., MA:    I work in Outpatient Care Management assigned to Mrs. Perez. I followed up this AM  with [facility] to ensure their receipt of orders for Replacement Hosp Bed Mattress and Wheelchair Gel Cushion. I am told they did not receive the fax though.     Can you please refax the orders. I would do so to help, but am unable to find the orders in [facility]. [facility]  FAX:  [phone]     Thank you,    ROSSY De Leon, RN, CCM [facility]  [email or web address]  TEL:  [phone]

## 2019-01-21 NOTE — PROGRESS NOTES
"Subjective:       Patient ID: Lucy Perez is a 58 y.o. female.    Chief Complaint: Follow-up    This very pleasant 59yo BF is followed for:    -- central pain syndrome.  She had a R SAH requiring emergent hemicraniex on 8/13/16.  She received a PEG but has since been placed on a diet.  [place] [facility] [place] SNF for only 18 days where she did not progress much.   There are no records available from her hospitalization.  She is c/o left side pain as "stinging" and "electric shocks".  This occurs without movement but especially with PROM of the arm/leg.  Another MD increased gabapentin from 100mg TID to 300mg TID but this made her too sleepy and was changed to 300mg Q HS only.  I started carbamazepine 200mg BID at the last visit on 10/18/16 but her daughter reports today that it was no help and was stopped.      -- spasticity involving primarily the left hand.  It is difficult to extend the fingers of the left hand due to pain.  It is not clear if this is pain of spasticity or due to the central pain syndrome.  I started dantrolene 25mg TID and increased to 50mg TID but she was hospitalized with HTN which was attributed to dantrolene (?) and it was stopped before she could take the 50mg dose.  She recently had HH PT/OT restarted   She is able to answer my questions mostly appropriately but does appear to have some cognitive deficits.  Her family is very involved in her care.      --CC complaint today of "left arm/leg spasticity". Last visit Botox was performed. Patient found little improvement with injections. Patient does not wish to have injections again. Patient currently not taking dantrolene at this time. She complains of difficulty sleeping and depressed mood. Denies SI/HI. Ultimate goal is "to walk again". No other complaints today.                Review of Systems   Constitutional: Negative for fatigue.   Eyes: Negative for visual disturbance.   Respiratory: Negative for shortness of " breath.    Cardiovascular: Negative for chest pain and leg swelling.   Gastrointestinal: Negative for constipation.   Genitourinary: Negative for difficulty urinating, dysuria, frequency and urgency.   Musculoskeletal: Positive for gait problem. Negative for arthralgias, back pain, joint swelling, myalgias, neck pain and neck stiffness.   Neurological: Positive for speech difficulty, weakness and numbness. Negative for tremors.   Psychiatric/Behavioral: Negative for dysphoric mood and sleep disturbance. The patient is not nervous/anxious.        Objective:      Physical Exam   Constitutional: She is oriented to person, place, and time. She appears well-nourished.   Eyes: EOM are normal. Pupils are equal, round, and reactive to light.   Neck: Normal range of motion. Neck supple.   Cardiovascular: Normal rate.   Pulmonary/Chest: Effort normal.   Musculoskeletal:   RUE/RLE AROM WNL  LUE/LLE with no AROM   Neurological: She is oriented to person, place, and time.   RUE/RLE 5/5  LUE 0/5  LLE 1/5 HF, HE, 0/5 KF/KE/DF/PF   Skin: No rash noted.   Psychiatric: She has a normal mood and affect.       Assessment:       1. Left spastic hemiparesis -- Continue dantrolene to 50mg TID. She has a resting hand splint which she cannot wear due to the tone.  I explained that the objective is to relieve the spasticity enough that the pt can don the splint for extended periods which would likely help with her tone.      Plan:  --restart Dantrolene at 50mg TID  --renny ordered. PT ordered for gait training with walker      Follow up: Will schedule for 2 month follow up to monitor progression          Plan:       As above

## 2019-01-22 ENCOUNTER — OUTPATIENT CASE MANAGEMENT (OUTPATIENT)
Dept: ADMINISTRATIVE | Facility: OTHER | Age: 59
End: 2019-01-22

## 2019-01-22 ENCOUNTER — TELEPHONE (OUTPATIENT)
Dept: INTERNAL MEDICINE | Facility: CLINIC | Age: 59
End: 2019-01-22

## 2019-01-22 NOTE — PROGRESS NOTES
1/22/19  Summary:  Follow up phone call to patient's spouse, Wilder. Pt continues HD M-W-F. Spouse is very attentive to pt who requires full assistance with her ADLs/IADLs.   Spouse reports having some back pain with h/o back surgery- concerning to spouse on how much/how long he can continue to physical assist pt. Spouse reports pt taking stool softener, needing Fleets enema on Sun 1/20 w/positive BM results. Pt limited to 2 (8 oz) /day ESRD fluid intake.   Sacral decubitus had open area that daughter has applied neosporin. The area is said to be healing. Spouse is not sure about any emotional changes with new anti-depressant, Wellbutrin but he thinks that perhaps there is an improvement to pt's mood. Spouse is unable to inform RN CM about pt's BGs. Med Rec continues to be pending with daughter who works during day hours. Spouse is unaware of whether mailings received. Spouse to check and monitor for the mail. Spouse agrees to f/u RN CM next week and to plan to actively update pt/family regarding DME.     Interventions:  DME-----Updated spouse in DME orders:  [facility] received W/C GEL CUSHION order & to send letter of med necessity to PCP; they did not receive the REPLACEMENT HOSP BED MATTRESS order-- in basket message sent to Dr. Muniz to notify, and faxed to [facility] the Physical Med HEM WALKER order not received along with Insurance Info, Physical Med /PCP visit notes. Informed spouse that RN CM will keep him up-to-date on DME. Spouse unaware of yulissa walker order but was aware of OP PT being ordered.   CONSTIPATION--encouraged spouse to include roughage in diet as per renal restrictions.--fresh fruit, vegs, oatmeal as pt had for breakfast being very good and bowel regimen (softener, Miralax).   Encouraged spouse to use a notebook diary in home near to pt meds to record all vital info ie BGs, BMs, trends in beh, changes in beh or concerns and bring the diary to MD appts. With the notebook in pt's home, all c/gs  have access to pt information and not just their daughter, Reina who is often times at work. Spouse states his understanding.   Voice message left for daughter, Reina to return call when she is able, in order to review meds and answer questions, discuss keeping dairy of pt status.     Plan:  Resources---DME-- f/u on Replacement Hosp Bed Mattress,  W/C Gel Cushion,  Zhou Walker per [facility]. F/u on LCSW assessment.   Complete Med Rec with daughter. Coordinate with HD dietitian.   Decubitus -- Follow-up on receipt of mailed material. Continue teaching to prevent.   DM- Follow-up on receipt of mailed material. Continue teaching on DM mgmnt, BG checks/logs, s/s hypo/hyperglycemia.   Constipation-- Follow up on receipt of mailed material. Continue teaching to prevent constipation.

## 2019-01-22 NOTE — TELEPHONE ENCOUNTER
----- Message from Patty Vazquez RN sent at 1/22/2019  9:28 AM CST -----  Contact: KARINA De Leon Dr./Staff:    Eliu baker. Update on DME orders to [facility].   [facility] confirm receipt of order for  Wheelchair Gel Cushion 1/18   But not for the  Replacement Hosp Bed Mattress.     Also, [facility] will be faxing a letter of med necessity to be filled out by PCP for the Wheelchair Gel Cushion and then a subsequent letter once Replacement Hosp Bed Mattress order received and processed.     Please advise on the mattress order.   [facility] FAX: [phone]    Thank you again.   ROSSY De Leon, RN, CCM [facility]  [email or web address]  TEL:  [phone]

## 2019-01-22 NOTE — PROGRESS NOTES
Summary:  1st Attempt to complete Social Work Assessment for Outpatient Care Management; left message on mobile phone requesting a return call.  Called home number and daughter Reina answered stating she's at work and requests at call back Thursday 1/24/19 at 10am when she will be at home with pt.        Interventions:  Spoke to daughter re: preferred call back date/time    Plan:  Complete assessment 1/24/19    Todays OPCM Self-Management Care Plan was developed with the patients/caregivers input and was based on identified barriers from todays assessment.  Goals were written today with the patient/caregiver and the patient has agreed to work towards these goals to improve his/her overall well-being. Patient verbalized understanding of the care plan, goals, and all of today's instructions. Encouraged patient/caregiver to communicate with his/her physician and health care team about health conditions and the treatment plan.  Provided my contact information today and encouraged patient/caregiver to call me with any questions as needed.

## 2019-01-24 ENCOUNTER — OUTPATIENT CASE MANAGEMENT (OUTPATIENT)
Dept: ADMINISTRATIVE | Facility: OTHER | Age: 59
End: 2019-01-24

## 2019-01-24 NOTE — PROGRESS NOTES
Summary:  2nd attempt to complete Social Work Assessment for OPCM; left message for daughter Reina and spouse Wilder requesting a return call. LCSW will mail a letter with contact information requesting a return call.  OPCM RN notified.      Intervention:  Message left for spouse and daughter to call  Notified OPCM RN  Mailed letter requesting a return call    Plan:  Attempt to complete assessment 2/5/19

## 2019-01-25 ENCOUNTER — OUTPATIENT CASE MANAGEMENT (OUTPATIENT)
Dept: ADMINISTRATIVE | Facility: OTHER | Age: 59
End: 2019-01-25

## 2019-01-25 NOTE — PROGRESS NOTES
1/25/19  Summary:  Follow up phone call to patient.'s spouse. Spouse confirms thinking that he already has a cushion for w/c--maybe not a gel cushion. Spouse reports mailings arrived today.   DME coordination.  [facility]---W/c gel cushion was declined by Wilder- pt's spouse, already has one. Zhou Walker orders received and being processed currently. Per Sara, Dr. Muniz, PCP faxed orders for Replacement Hosp Bed Mattress to [facility]. [employer] confirm receipt of orders but on hold until the co pay is agreed upon by pt/family = $23.57. Pt/family have been unable to reach so far and account closed. Provided spouse's cell ph number as additional contact. Account reopened and [employer] to contact spouse.     Interventions:  Notified spouse of DME status. Instructed spouse to check the condition of cushion on pt's w/c and let RN CM know.   Updated PCP on DME orders.    Plan:  F/u on receipt of DME.   Resources--- f/u on Replacement Hosp Bed Mattress,  W/C Gel Cushion,  Zhou Walker.  F/u on LCSW assessment.--attempts x 2.   Complete Med Rec with daughter. Coordinate with HD dietitian.   Decubitus --  Continue teaching to prevent.   DM- Continue teaching on DM mgmnt, BG checks/logs, s/s hypo/hyperglycemia.   Constipation--Continue teaching to prevent constipation.

## 2019-01-29 ENCOUNTER — OUTPATIENT CASE MANAGEMENT (OUTPATIENT)
Dept: ADMINISTRATIVE | Facility: OTHER | Age: 59
End: 2019-01-29

## 2019-01-29 NOTE — PROGRESS NOTES
1/29/19  Summary:  Follow up phone call to patient's spouse who answers call. Spouse indicates he is busy but does answer that pt now has the hosp bed replacement mattress & yulissa walker. Spouse prefers to be called at another time.   Call to daughterReina to complete Med Rec--- verbal message left requesting call back and to take 5 minutes of her time to complete med rec at daughter's convenience.     Interventions:  None.     Plan:  Complete Med Rec with daughter. Coordinate with HD dietitian.   Decubitus -- Follow-up on receipt of mailed material. Continue teaching to prevent.   DM- Follow-up on receipt of mailed material. Continue teaching on DM mgmnt, BG checks/logs, s/s hypo/hyperglycemia.   Constipation-- Follow up on receipt of mailed material. Continue teaching to prevent constipation.       Todays OPCM Self-Management Care Plan was developed with the patients/caregivers input and was based on identified barriers from todays assessment.  Goals were written today with the patient/caregiver and the patient has agreed to work towards these goals to improve his/her overall well-being. Patient verbalized understanding of the care plan, goals, and all of today's instructions. Encouraged patient/caregiver to communicate with his/her physician and health care team about health conditions and the treatment plan.  Provided my contact information today and encouraged patient/caregiver to call me with any questions as needed.

## 2019-02-01 ENCOUNTER — TELEPHONE (OUTPATIENT)
Dept: VASCULAR SURGERY | Facility: CLINIC | Age: 59
End: 2019-02-01

## 2019-02-01 NOTE — TELEPHONE ENCOUNTER
----- Message from Estelle Muñoz sent at 2/1/2019  9:04 AM CST -----  Contact: Carmen   Needs Advice    Reason for call: The dialysis scheduled a procedure with another physician, per the patient on 1/7         Communication Preference: [phone]    Additional Information: please contact the patient to consult her

## 2019-02-01 NOTE — TELEPHONE ENCOUNTER
----- Message from Keira Abbasi sent at 2/1/2019  1:32 PM CST -----  Contact: Keira Werner  [phone]  Patient Requesting Sooner Appointment.     Reason for sooner appt.: fistula gram   When is the first available appointment? N/a   Communication Preference: can you please call Keira at [phone]  Additional Information: none    DOMINIK

## 2019-02-01 NOTE — NURSING
"Spoke with patient via phone, stated "I would like to see if I can see Dr Valencia who knows my history, will call over to [facility] and have them call and scheduled me with Dr. Valencia at the [facility]". Verbalized understanding, awaiting a return call.  "

## 2019-02-05 ENCOUNTER — OUTPATIENT CASE MANAGEMENT (OUTPATIENT)
Dept: ADMINISTRATIVE | Facility: OTHER | Age: 59
End: 2019-02-05

## 2019-02-05 NOTE — PROGRESS NOTES
Summary:   LCSW mailed letter after 2nd attempt to complete SW Assessment with contact information requesting a return call and pt has not reached out to this LCSW.  Final attempt to reach pt/famil today; call was answered and this SW requested to speak to pt or spouse; SW was put on hold then call disconnected.  Will close case and notified OPCM RN.    Intervention:  Unable to reach pt x 4 attempts  OPCM RN notified of case closure.     Plan:  Close case

## 2019-02-07 ENCOUNTER — HOSPITAL ENCOUNTER (OUTPATIENT)
Dept: VASCULAR SURGERY | Facility: CLINIC | Age: 59
Discharge: HOME | End: 2019-02-07
Attending: SURGERY
Payer: MEDICARE

## 2019-02-07 ENCOUNTER — OFFICE VISIT (OUTPATIENT)
Dept: VASCULAR SURGERY | Facility: CLINIC | Age: 59
End: 2019-02-07
Payer: MEDICARE

## 2019-02-07 VITALS
DIASTOLIC BLOOD PRESSURE: 79 MMHG | BODY MASS INDEX: 30.46 KG/M2 | HEIGHT: 63 IN | TEMPERATURE: 98 F | WEIGHT: 171.94 LBS | SYSTOLIC BLOOD PRESSURE: 134 MMHG | HEART RATE: 67 BPM

## 2019-02-07 DIAGNOSIS — N18.6 ESRD (END STAGE RENAL DISEASE) ON DIALYSIS: ICD-10-CM

## 2019-02-07 DIAGNOSIS — N18.6 ESRD (END STAGE RENAL DISEASE) ON DIALYSIS: Primary | ICD-10-CM

## 2019-02-07 DIAGNOSIS — Z99.2 ESRD (END STAGE RENAL DISEASE) ON DIALYSIS: ICD-10-CM

## 2019-02-07 DIAGNOSIS — Z99.2 ESRD (END STAGE RENAL DISEASE) ON DIALYSIS: Primary | ICD-10-CM

## 2019-02-07 PROCEDURE — 99214 PR OFFICE/OUTPT VISIT, EST, LEVL IV, 30-39 MIN: ICD-10-PCS | Mod: HCNC,S$GLB,, | Performed by: SURGERY

## 2019-02-07 PROCEDURE — 93990 DOPPLER FLOW TESTING: CPT | Mod: HCNC,S$GLB,, | Performed by: SURGERY

## 2019-02-07 PROCEDURE — 3008F PR BODY MASS INDEX (BMI) DOCUMENTED: ICD-10-PCS | Mod: HCNC,CPTII,S$GLB, | Performed by: SURGERY

## 2019-02-07 PROCEDURE — 99999 PR PBB SHADOW E&M-EST. PATIENT-LVL IV: ICD-10-PCS | Mod: PBBFAC,HCNC,, | Performed by: SURGERY

## 2019-02-07 PROCEDURE — 3008F BODY MASS INDEX DOCD: CPT | Mod: HCNC,CPTII,S$GLB, | Performed by: SURGERY

## 2019-02-07 PROCEDURE — 99999 PR PBB SHADOW E&M-EST. PATIENT-LVL IV: CPT | Mod: PBBFAC,HCNC,, | Performed by: SURGERY

## 2019-02-07 PROCEDURE — 99214 OFFICE O/P EST MOD 30 MIN: CPT | Mod: HCNC,S$GLB,, | Performed by: SURGERY

## 2019-02-07 PROCEDURE — 93990 PR DUPLEX HEMODIALYSIS ACCESS: ICD-10-PCS | Mod: HCNC,S$GLB,, | Performed by: SURGERY

## 2019-02-07 NOTE — H&P (VIEW-ONLY)
Lucy Aubrey Perez  02/07/2019    HPI:   Patient is a 58 y.o. female with a history of HTN, HLD, L hemiplegia after large stroke in 2016, DM II, stage V CKD since August 2018 MWF for f/u  Now w some occasional clots w HD    She has a residual impairment on her left upper extremity and left lower extremity from her stroke and is currently wheel chair bound. She has complete function of her right arm currently. She is right handed. No history of trauma to right arm.   Was seeing Dr. Blank for nephology but he no longer works at [employer].     s/p  10/18/18  RUE AVG creation, gore interring 4-7mm tapered brachial artery/vein    Findings/Key Components:  Fair thrill palpable throughout AVG  SBP 90s - 100s dev-op  2+ R radial pulse; minimal augmentation from strong biphasic to triphasic in R radial and ulnar doppler signals with AVG compression  Hold nifedipine post-op    Retired  at [employer].     No MI, but history of stroke in 2016 (hemorragic)   Tobacco use: never use    Renal is Dr LING Perez    Past Medical History:   Diagnosis Date    Anemia of chronic disease 6/10/2017    Anxiety     Arthritis of right acromioclavicular joint 7/2/2014    Asthma     Bipolar disorder     Bronchitis, acute     Cataract     Cholelithiasis     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    Cortical cataract of both eyes 7/26/2016    Decubitus ulcer of buttock, stage 2     Degeneration of lumbar or lumbosacral intervertebral disc 3/5/2013    S/p MRI L-spine 5/2009     Depression     ESRD on hemodialysis     Started August 2018    General anesthetics causing adverse effect in therapeutic use     Hemorrhagic cerebrovascular accident (CVA)     8/2016 s/p Hemicraniotomy at [facility] with Left hemiparesis    Hemorrhagic cerebrovascular accident (CVA) 1/3/2018    History of stroke 6/28/2017    s/p R-MCA stroke with R-putaminal hemorrhagic transformation in 8/2016 and 11/2016  (s/p [facility]micraniotomy at [facility]) with residual L hemiparesis, on AED s/p CVA      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    Hyperlipidemia     Hypertensive retinopathy of both eyes 7/26/2016    Impingement syndrome of right shoulder 7/2/2014    Left ventricular diastolic dysfunction with preserved systolic function 12/15/2015    Mixed anxiety and depressive disorder 3/5/2013    Obesity     THERESA (obstructive sleep apnea) 3/5/2013    No Home CPAP 2ndary to cost     Partial symptomatic epilepsy with complex partial seizures, not intractable, without status epilepticus     PEG (percutaneous endoscopic gastrostomy) status 6/28/2017    Renovascular hypertension 3/5/2013    Will resume home medications: amlodipine, labetalol, and hydralazine Will hold Lisinopril in setting of DARLING.   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 without macular edema, without long-term current use of insulin 8/2/2017    Type 2 diabetes mellitus with diabetic polyneuropathy, without long-term current use of insulin 10/22/2015    Type 2 diabetes mellitus with stage 3 chronic kidney disease, without long-term current use of insulin 10/22/2015    Vertebral artery stenosis 3/5/2013    S/p Stenting per Dr Burnett      Past Surgical History:   Procedure Laterality Date    ARTHROSCOPY-SHOULDER WITH SUBACROMIAL DECOMPRESSION Right 7/9/2014    Performed by Kendall Pantoja MD at [facility] [place] [place] [facility] OR    AV GRAFT CREATION Right 10/18/2018    Performed by Meir [facility] at [facility] OR 2ND FLR    Biceps Tenotomy Right 7/9/2014    Performed by Kendall Pantoja MD at [facility] [place] [place] [facility] [place]    BREAST SURGERY      breast reduction    COLONOSCOPY N/A  "8/11/2016    Performed by Jerry [facility] at [facility] ENDO (4TH FLR)    DEBRIDEMENT-SHOULDER-ARTHROSCOPIC Labral Cuff Right 7/9/2014    Performed by Kendall [facility] at [facility] OR    ESOPHAGOGASTRODUODENOSCOPY (EGD) N/A 12/6/2017    Performed by Dallas Lock Jr., MD at [facility] ENDO    [id number]-IISMAT END Right 7/9/2014    Performed by Kendall [facility]ja MD at [facility] OR    HYSTERECTOMY      ROTATOR CUFF REPAIR Right July 9, 2014    right side    Skull surgery      Aneurysm    stent placed      in vertebral artery    TOTAL REDUCTION MAMMOPLASTY      TUBAL LIGATION       Family History   Problem Relation Age of Onset    Cancer Mother 55        Breast cancer    Diabetes Mother     Hypertension Mother     Heart disease Mother     Heart attack Mother     Breast cancer Mother     Stroke Sister     Hypertension Sister     Sleep apnea Sister     No Known Problems Daughter     Diabetes Son         controlled    Bell's palsy Sister     Lupus Sister     Blindness Maternal Grandmother     Diabetes Unknown         "My entire family family has diabetes"    Stroke Maternal Aunt     Amblyopia Neg Hx     Cataracts Neg Hx     Glaucoma Neg Hx     Macular degeneration Neg Hx     Retinal detachment Neg Hx     Strabismus Neg Hx      Social History     Socioeconomic History    Marital status:      Spouse name: Not on file    Number of children: Not on file    Years of education: Not on file    Highest education level: Not on file   Social Needs    Financial resource strain: Not on file    Food insecurity - worry: Not on file    Food insecurity - inability: Not on file    Transportation needs - medical: Not on file    Transportation needs - non-medical: Not on file   Occupational History    Not on file   Tobacco Use    Smoking status: Never Smoker    Smokeless tobacco: Never Used   Substance and Sexual Activity    Alcohol use: No     Comment: occasional wine cooler     Drug " "use: No    Sexual activity: Yes     Partners: Male   Other Topics Concern    Not on file   Social History Narrative    . 2 children(Wilder and Reina). Does not work. Previously worked as a .      Current Outpatient Medications on File Prior to Visit   Medication Sig    acetaminophen (TYLENOL) 325 MG tablet Take 2 tablets (650 mg total) by mouth every 6 (six) hours as needed for Pain.    albuterol (PROVENTIL) 2.5 mg /3 mL (0.083 %) nebulizer solution Take 3 mLs (2.5 mg total) by nebulization every 6 (six) hours as needed for Wheezing. Rescue    alprazolam (XANAX ORAL) Take 0.5 mg by mouth as needed (anxiety).     aspirin (ECOTRIN) 81 MG EC tablet Take 81 mg by mouth every morning.     atorvastatin (LIPITOR) 40 MG tablet TAKE 1 TABLET ONE TIME DAILY FOR CHOLESTEROL (Patient taking differently: Take 40 mg by mouth every evening. TAKE 1 TABLET ONE TIME DAILY FOR CHOLESTEROL)    BD INSULIN PEN NEEDLE UF SHORT 31 gauge x 5/16" Ndle USE TO INJECT NOVOLOG FLEXPEN BEFORE MEALS    blood sugar diagnostic (ACCU-CHEK SMARTVIEW TEST STRIP) Strp 1 strip by Misc.(Non-Drug; Combo Route) route 2 (two) times daily.    buPROPion (WELLBUTRIN XL) 150 MG TB24 tablet Take 1 tablet (150 mg total) by mouth once daily.    carvedilol (COREG) 25 MG tablet Take 1 tablet (25 mg total) by mouth 2 (two) times daily.    dantrolene (DANTRIUM) 50 MG Cap Take 1 capsule (50 mg total) by mouth 3 (three) times daily.    DULCOLAX, BISACODYL, ORAL Take by mouth as needed (constipation).    ergocalciferol (ERGOCALCIFEROL) 50,000 unit Cap 1 capsule (50,000 Units total) by Per G Tube route every 7 days. (Patient taking differently: Take 50,000 Units by mouth every 7 days. Takes on Thurs)    famotidine (PEPCID) 20 MG tablet Take 1 tablet (20 mg total) by mouth once daily.    ferrous sulfate 220 mg (44 mg iron)/5 mL solution 10ml daily for Iron/Give via PEG (Patient taking differently: Take 220 mg by mouth every morning. " )    folic acid (FOLVITE) 1 MG tablet Take 1 tablet (1 mg total) by mouth once daily.    furosemide (LASIX) 40 MG tablet Take 1 tablet (40 mg total) by mouth once daily. (Patient taking differently: Take 40 mg by mouth every morning. )    insulin glargine (LANTUS U-100 INSULIN) 100 unit/mL injection 10 units daily in the PM    levETIRAcetam (KEPPRA) 500 MG Tab Take 1 tablet (500 mg total) by mouth every 8 (eight) hours.    lidocaine-prilocaine (EMLA) cream Apply topically as needed.    polyethylene glycol 3350 (MIRALAX ORAL) Take by mouth every evening.     pregabalin (LYRICA) 25 MG capsule 1 cap in AM and 2 caps at Bedtime for muscle spasm    sevelamer carbonate (RENVELA) 800 mg Tab Take 800 mg by mouth 3 (three) times daily with meals.    sodium bicarbonate 650 MG tablet Take 2 tablets (1,300 mg total) by mouth 2 (two) times daily.     Current Facility-Administered Medications on File Prior to Visit   Medication    onabotulinumtoxina injection 300 Units       REVIEW OF SYSTEMS:  General: negative; ENT: negative; Allergy and Immunology: negative; Hematological and Lymphatic: Negative; Endocrine: negative; Respiratory: no cough, shortness of breath, or wheezing; Cardiovascular: no chest pain or dyspnea on exertion; Gastrointestinal: no abdominal pain/back, change in bowel habits, or bloody stools; Genito-Urinary: no dysuria, trouble voiding, or hematuria; Musculoskeletal: negative  Neurological: no TIA or stroke symptoms    PHYSICAL EXAM:       Vitals:    02/07/19 1318   BP: 134/79   Pulse: 67   Temp: 98.3 °F (36.8 °C)       General appearance:  Alert, well-appearing, and in no distress.  Oriented to person, place, and time   Neurological: Normal speech, no focal findings noted; CN II - XII grossly intact           Musculoskeletal: Digits/nail without cyanosis/clubbing.  Normal muscle strength/tone.                 Neck: Supple, no significant adenopathy; thyroid is not enlarged                  No carotid  bruit can be auscultated                Chest:  Clear to auscultation, no wheezes, rales or rhonchi, symmetric air entry right permacath     No use of accessory muscles             Cardiac: Normal rate and regular rhythm, S1 and S2 normal; PMI non-displaced          Abdomen: Soft, nontender, nondistended, no masses or organomegaly     No rebound tenderness noted; bowel sounds normal     Pulsatile aortic mass is no palpable.     No groin adenopathy      Extremities:   RUE VAG with good thrill     Non-palpable R radial pulse (pre-op 2+ R radial pulse)     Positive Davidson's test        R hand - motor/sensitive intact    LAB RESULTS:  Lab Results   Component Value Date    K 4.2 01/15/2019    K 4.3 12/08/2018    K 4.0 08/31/2018    CREATININE 5.8 (H) 01/15/2019    CREATININE 4.2 (H) 12/08/2018    CREATININE 3.9 (H) 08/31/2018     Lab Results   Component Value Date    WBC 4.66 01/10/2019    WBC 5.03 12/08/2018    WBC 7.62 08/31/2018    HCT 45.8 01/10/2019    HCT 37.4 12/08/2018    HCT 40 12/08/2018     (L) 01/10/2019     12/08/2018     08/31/2018     Lab Results   Component Value Date    HGBA1C 8.5 (H) 01/15/2019    HGBA1C 5.7 (H) 08/31/2018    HGBA1C 5.6 08/10/2018     IMAGING:  Previous:  AVG u/s: flow vol 6223 ml/min (previous 858 ml/min)  + outflow stenosis w  cm/s    Previous:  Vein mapping:  Right- cephalic medial forearm 4.1, basilic antecubital 6.3  Left- cpehalic vein 2.0, antecubital fossa 3.6    IMP/PLAN:  58 y.o. female with a history of HTN, HLD, L hemiplegia after large stroke in 2016, DM II, stage V CKD since August 2018 - doing well s/p creation of RUE AVG  Cont ASA 81 po qd  Now w some occasional clots w HD: plan for PTA outflow: 2/11/19    Meir Valencia MD FACS  Vascular/Endovascular Surgery

## 2019-02-07 NOTE — PROGRESS NOTES
Lucy Aubrey Perez  02/07/2019    HPI:   Patient is a 58 y.o. female with a history of HTN, HLD, L hemiplegia after large stroke in 2016, DM II, stage V CKD since August 2018 MWF for f/u  Now w some occasional clots w HD    She has a residual impairment on her left upper extremity and left lower extremity from her stroke and is currently wheel chair bound. She has complete function of her right arm currently. She is right handed. No history of trauma to right arm.   Was seeing Dr. Blank for nephology but he no longer works at [employer].     s/p  10/18/18  RUE AVG creation, gore interring 4-7mm tapered brachial artery/vein    Findings/Key Components:  Fair thrill palpable throughout AVG  SBP 90s - 100s dev-op  2+ R radial pulse; minimal augmentation from strong biphasic to triphasic in R radial and ulnar doppler signals with AVG compression  Hold nifedipine post-op    Retired  at [employer].     No MI, but history of stroke in 2016 (hemorragic)   Tobacco use: never use    Renal is Dr LING Perez    Past Medical History:   Diagnosis Date    Anemia of chronic disease 6/10/2017    Anxiety     Arthritis of right acromioclavicular joint 7/2/2014    Asthma     Bipolar disorder     Bronchitis, acute     Cataract     Cholelithiasis     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    Cortical cataract of both eyes 7/26/2016    Decubitus ulcer of buttock, stage 2     Degeneration of lumbar or lumbosacral intervertebral disc 3/5/2013    S/p MRI L-spine 5/2009     Depression     ESRD on hemodialysis     Started August 2018    General anesthetics causing adverse effect in therapeutic use     Hemorrhagic cerebrovascular accident (CVA)     8[place] [facility] [place]    Hemorrhagic cerebrovascular accident (CVA) 1/3/2018    History of stroke 6/28/2017    s/p R-MCA stroke with R-putaminal hemorrhagic transformation in 8/2016 and 11/2016  (s/p [facility]micraniotomy at [facility]) with residual L hemiparesis, on AED s/p CVA      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    Hyperlipidemia     Hypertensive retinopathy of both eyes 7/26/2016    Impingement syndrome of right shoulder 7/2/2014    Left ventricular diastolic dysfunction with preserved systolic function 12/15/2015    Mixed anxiety and depressive disorder 3/5/2013    Obesity     THERESA (obstructive sleep apnea) 3/5/2013    No Home CPAP 2ndary to cost     Partial symptomatic epilepsy with complex partial seizures, not intractable, without status epilepticus     PEG (percutaneous endoscopic gastrostomy) status 6/28/2017    Renovascular hypertension 3/5/2013    Will resume home medications: amlodipine, labetalol, and hydralazine Will hold Lisinopril in setting of DARLING.   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 without macular edema, without long-term current use of insulin 8/2/2017    Type 2 diabetes mellitus with diabetic polyneuropathy, without long-term current use of insulin 10/22/2015    Type 2 diabetes mellitus with stage 3 chronic kidney disease, without long-term current use of insulin 10/22/2015    Vertebral artery stenosis 3/5/2013    S/p Stenting per Dr Burnett      Past Surgical History:   Procedure Laterality Date    ARTHROSCOPY-SHOULDER WITH SUBACROMIAL DECOMPRESSION Right 7/9/2014    [facility] OR    AV GRAFT CREATION Right 10/18/2018    Performed by [facility]ir [facility] at [facility] OR 2ND FLR    Biceps Tenotomy Right 7/9/2014    [facility] by [facility] at [facility] OR    BREAST SURGERY      breast reduction    COLONOSCOPY N/A  "8/11/2016    Performed by Jerry Vilchis MD at [facility] ENDO (4TH FLR)    DEBRIDEMENT-SHOULDER-ARTHROSCOPIC Labral Cuff Right 7/9/2014    [facility] by [facility] at [facility] OR    ESOPHAGOGASTRODUODENOSCOPY (EGD) N/A 12/6/2017    Performed by Dallas Lock Jr., [facility] at [facility] ENDO    [id number] END Right 7/9/2014    Performed by Kendall Pantoja MD at [facility] OR    HYSTERECTOMY      ROTATOR CUFF REPAIR Right July 9, 2014    right side    Skull surgery      Aneurysm    stent placed      in vertebral artery    TOTAL REDUCTION MAMMOPLASTY      TUBAL LIGATION       Family History   Problem Relation Age of Onset    Cancer Mother 55        Breast cancer    Diabetes Mother     Hypertension Mother     Heart disease Mother     Heart attack Mother     Breast cancer Mother     Stroke Sister     Hypertension Sister     Sleep apnea Sister     No Known Problems Daughter     Diabetes Son         controlled    Bell's palsy Sister     Lupus Sister     Blindness Maternal Grandmother     Diabetes Unknown         "My entire family family has diabetes"    Stroke Maternal Aunt     Amblyopia Neg Hx     Cataracts Neg Hx     Glaucoma Neg Hx     Macular degeneration Neg Hx     Retinal detachment Neg Hx     Strabismus Neg Hx      Social History     Socioeconomic History    Marital status:      Spouse name: Not on file    Number of children: Not on file    Years of education: Not on file    Highest education level: Not on file   Social Needs    Financial resource strain: Not on file    Food insecurity - worry: Not on file    Food insecurity - inability: Not on file    Transportation needs - medical: Not on file    Transportation needs - non-medical: Not on file   Occupational History    Not on file   Tobacco Use    Smoking status: Never Smoker    Smokeless tobacco: Never Used   Substance and Sexual Activity    Alcohol use: No     Comment: occasional wine cooler     Drug " "use: No    Sexual activity: Yes     Partners: Male   Other Topics Concern    Not on file   Social History Narrative    . 2 children(Wilder and Reina). Does not work. Previously worked as a .      Current Outpatient Medications on File Prior to Visit   Medication Sig    acetaminophen (TYLENOL) 325 MG tablet Take 2 tablets (650 mg total) by mouth every 6 (six) hours as needed for Pain.    albuterol (PROVENTIL) 2.5 mg /3 mL (0.083 %) nebulizer solution Take 3 mLs (2.5 mg total) by nebulization every 6 (six) hours as needed for Wheezing. Rescue    alprazolam (XANAX ORAL) Take 0.5 mg by mouth as needed (anxiety).     aspirin (ECOTRIN) 81 MG EC tablet Take 81 mg by mouth every morning.     atorvastatin (LIPITOR) 40 MG tablet TAKE 1 TABLET ONE TIME DAILY FOR CHOLESTEROL (Patient taking differently: Take 40 mg by mouth every evening. TAKE 1 TABLET ONE TIME DAILY FOR CHOLESTEROL)    BD INSULIN PEN NEEDLE UF SHORT 31 gauge x 5/16" Ndle USE TO INJECT NOVOLOG FLEXPEN BEFORE MEALS    blood sugar diagnostic (ACCU-CHEK SMARTVIEW TEST STRIP) Strp 1 strip by Misc.(Non-Drug; Combo Route) route 2 (two) times daily.    buPROPion (WELLBUTRIN XL) 150 MG TB24 tablet Take 1 tablet (150 mg total) by mouth once daily.    carvedilol (COREG) 25 MG tablet Take 1 tablet (25 mg total) by mouth 2 (two) times daily.    dantrolene (DANTRIUM) 50 MG Cap Take 1 capsule (50 mg total) by mouth 3 (three) times daily.    DULCOLAX, BISACODYL, ORAL Take by mouth as needed (constipation).    ergocalciferol (ERGOCALCIFEROL) 50,000 unit Cap 1 capsule (50,000 Units total) by Per G Tube route every 7 days. (Patient taking differently: Take 50,000 Units by mouth every 7 days. Takes on Thurs)    famotidine (PEPCID) 20 MG tablet Take 1 tablet (20 mg total) by mouth once daily.    ferrous sulfate 220 mg (44 mg iron)/5 mL solution 10ml daily for Iron/Give via PEG (Patient taking differently: Take 220 mg by mouth every morning. " )    folic acid (FOLVITE) 1 MG tablet Take 1 tablet (1 mg total) by mouth once daily.    furosemide (LASIX) 40 MG tablet Take 1 tablet (40 mg total) by mouth once daily. (Patient taking differently: Take 40 mg by mouth every morning. )    insulin glargine (LANTUS U-100 INSULIN) 100 unit/mL injection 10 units daily in the PM    levETIRAcetam (KEPPRA) 500 MG Tab Take 1 tablet (500 mg total) by mouth every 8 (eight) hours.    lidocaine-prilocaine (EMLA) cream Apply topically as needed.    polyethylene glycol 3350 (MIRALAX ORAL) Take by mouth every evening.     pregabalin (LYRICA) 25 MG capsule 1 cap in AM and 2 caps at Bedtime for muscle spasm    sevelamer carbonate (RENVELA) 800 mg Tab Take 800 mg by mouth 3 (three) times daily with meals.    sodium bicarbonate 650 MG tablet Take 2 tablets (1,300 mg total) by mouth 2 (two) times daily.     Current Facility-Administered Medications on File Prior to Visit   Medication    onabotulinumtoxina injection 300 Units       REVIEW OF SYSTEMS:  General: negative; ENT: negative; Allergy and Immunology: negative; Hematological and Lymphatic: Negative; Endocrine: negative; Respiratory: no cough, shortness of breath, or wheezing; Cardiovascular: no chest pain or dyspnea on exertion; Gastrointestinal: no abdominal pain/back, change in bowel habits, or bloody stools; Genito-Urinary: no dysuria, trouble voiding, or hematuria; Musculoskeletal: negative  Neurological: no TIA or stroke symptoms    PHYSICAL EXAM:       Vitals:    02/07/19 1318   BP: 134/79   Pulse: 67   Temp: 98.3 °F (36.8 °C)       General appearance:  Alert, well-appearing, and in no distress.  Oriented to person, place, and time   Neurological: Normal speech, no focal findings noted; CN II - XII grossly intact           Musculoskeletal: Digits/nail without cyanosis/clubbing.  Normal muscle strength/tone.                 Neck: Supple, no significant adenopathy; thyroid is not enlarged                  No carotid  bruit can be auscultated                Chest:  Clear to auscultation, no wheezes, rales or rhonchi, symmetric air entry right permacath     No use of accessory muscles             Cardiac: Normal rate and regular rhythm, S1 and S2 normal; PMI non-displaced          Abdomen: Soft, nontender, nondistended, no masses or organomegaly     No rebound tenderness noted; bowel sounds normal     Pulsatile aortic mass is no palpable.     No groin adenopathy      Extremities:   RUE VAG with good thrill     Non-palpable R radial pulse (pre-op 2+ R radial pulse)     Positive Davidson's test        R hand - motor/sensitive intact    LAB RESULTS:  Lab Results   Component Value Date    K 4.2 01/15/2019    K 4.3 12/08/2018    K 4.0 08/31/2018    CREATININE 5.8 (H) 01/15/2019    CREATININE 4.2 (H) 12/08/2018    CREATININE 3.9 (H) 08/31/2018     Lab Results   Component Value Date    WBC 4.66 01/10/2019    WBC 5.03 12/08/2018    WBC 7.62 08/31/2018    HCT 45.8 01/10/2019    HCT 37.4 12/08/2018    HCT 40 12/08/2018     (L) 01/10/2019     12/08/2018     08/31/2018     Lab Results   Component Value Date    HGBA1C 8.5 (H) 01/15/2019    HGBA1C 5.7 (H) 08/31/2018    HGBA1C 5.6 08/10/2018     IMAGING:  Previous:  AVG u/s: flow vol 6223 ml/min (previous 858 ml/min)  + outflow stenosis w  cm/s    Previous:  Vein mapping:  Right- cephalic medial forearm 4.1, basilic antecubital 6.3  Left- cpehalic vein 2.0, antecubital fossa 3.6    IMP/PLAN:  58 y.o. female with a history of HTN, HLD, L hemiplegia after large stroke in 2016, DM II, stage V CKD since August 2018 - doing well s/p creation of RUE AVG  Cont ASA 81 po qd  Now w some occasional clots w HD: plan for PTA outflow: 2/11/19    Meir Valencia MD FACS  Vascular/Endovascular Surgery

## 2019-02-11 ENCOUNTER — HOSPITAL ENCOUNTER (OUTPATIENT)
Facility: HOSPITAL | Age: 59
Discharge: HOME | End: 2019-02-11
Attending: SURGERY | Admitting: SURGERY
Payer: MEDICARE

## 2019-02-11 ENCOUNTER — OUTPATIENT CASE MANAGEMENT (OUTPATIENT)
Dept: ADMINISTRATIVE | Facility: OTHER | Age: 59
End: 2019-02-11

## 2019-02-11 VITALS
BODY MASS INDEX: 30.65 KG/M2 | TEMPERATURE: 96 F | RESPIRATION RATE: 20 BRPM | WEIGHT: 173 LBS | OXYGEN SATURATION: 95 % | HEART RATE: 61 BPM | DIASTOLIC BLOOD PRESSURE: 70 MMHG | HEIGHT: 63 IN | SYSTOLIC BLOOD PRESSURE: 151 MMHG

## 2019-02-11 DIAGNOSIS — N18.6 ESRD (END STAGE RENAL DISEASE) ON DIALYSIS: ICD-10-CM

## 2019-02-11 DIAGNOSIS — Z99.2 ESRD (END STAGE RENAL DISEASE) ON DIALYSIS: ICD-10-CM

## 2019-02-11 LAB — POCT GLUCOSE: 211 MG/DL (ref 70–110)

## 2019-02-11 PROCEDURE — C1725 CATH, TRANSLUMIN NON-LASER: HCPCS | Mod: HCNC | Performed by: SURGERY

## 2019-02-11 PROCEDURE — 25500020 PHARM REV CODE 255: Mod: HCNC | Performed by: SURGERY

## 2019-02-11 PROCEDURE — 36902 PR INTRO CATH, DIALYSIS CIRCUIT W/TRANSLML BALLOON ANGIO: ICD-10-PCS | Mod: HCNC,,, | Performed by: SURGERY

## 2019-02-11 PROCEDURE — C1894 INTRO/SHEATH, NON-LASER: HCPCS | Mod: HCNC | Performed by: SURGERY

## 2019-02-11 PROCEDURE — 82962 GLUCOSE BLOOD TEST: CPT | Mod: HCNC

## 2019-02-11 PROCEDURE — 63600175 PHARM REV CODE 636 W HCPCS: Mod: HCNC | Performed by: SURGERY

## 2019-02-11 PROCEDURE — 27201423 OPTIME MED/SURG SUP & DEVICES STERILE SUPPLY: Mod: HCNC | Performed by: SURGERY

## 2019-02-11 PROCEDURE — 99152 PR MOD CONSCIOUS SEDATION, SAME PHYS, 5+ YRS, FIRST 15 MIN: ICD-10-PCS | Mod: HCNC,,, | Performed by: SURGERY

## 2019-02-11 PROCEDURE — 36902 INTRO CATH DIALYSIS CIRCUIT: CPT | Mod: HCNC | Performed by: SURGERY

## 2019-02-11 PROCEDURE — C1769 GUIDE WIRE: HCPCS | Mod: HCNC | Performed by: SURGERY

## 2019-02-11 PROCEDURE — 99152 MOD SED SAME PHYS/QHP 5/>YRS: CPT | Mod: HCNC | Performed by: SURGERY

## 2019-02-11 PROCEDURE — 36902 INTRO CATH DIALYSIS CIRCUIT: CPT | Mod: HCNC,,, | Performed by: SURGERY

## 2019-02-11 PROCEDURE — 99152 MOD SED SAME PHYS/QHP 5/>YRS: CPT | Mod: HCNC,,, | Performed by: SURGERY

## 2019-02-11 RX ORDER — CEFAZOLIN SODIUM 1 G/3ML
2 INJECTION, POWDER, FOR SOLUTION INTRAMUSCULAR; INTRAVENOUS
Status: DISCONTINUED | OUTPATIENT
Start: 2019-02-11 | End: 2019-02-11 | Stop reason: HOSPADM

## 2019-02-11 RX ORDER — HEPARIN SODIUM 1000 [USP'U]/ML
INJECTION, SOLUTION INTRAVENOUS; SUBCUTANEOUS
Status: DISCONTINUED | OUTPATIENT
Start: 2019-02-11 | End: 2019-02-11 | Stop reason: HOSPADM

## 2019-02-11 RX ORDER — LIDOCAINE HYDROCHLORIDE 10 MG/ML
1 INJECTION, SOLUTION EPIDURAL; INFILTRATION; INTRACAUDAL; PERINEURAL ONCE
Status: DISCONTINUED | OUTPATIENT
Start: 2019-02-11 | End: 2019-02-11 | Stop reason: HOSPADM

## 2019-02-11 RX ORDER — SODIUM CHLORIDE 9 MG/ML
INJECTION, SOLUTION INTRAVENOUS CONTINUOUS
Status: DISCONTINUED | OUTPATIENT
Start: 2019-02-11 | End: 2019-02-11 | Stop reason: HOSPADM

## 2019-02-11 NOTE — PLAN OF CARE
Problem: Adult Inpatient Plan of Care  Goal: Plan of Care Review  Outcome: Ongoing (interventions implemented as appropriate)  Received report from KARINA carrillo. Patient s/p fistulagram, AAOx3. VSS, no c/o pain or discomfort at this time, resp even and unlabored. Gauze/tegaderm dressing to R UA  is CDI. No active bleeding. No hematoma noted. Post procedure protocol reviewed with patient and patient's family. Understanding verbalized. Family members at bedside. Nurse call bell within reach. Will continue to monitor per post procedure protocol.

## 2019-02-11 NOTE — Clinical Note
Angiography performed post intervention of the distal Right AV fistula. Angiography performed via hand injection with .

## 2019-02-11 NOTE — Clinical Note
Angiography performed of the distal Right AV Fistula. Angiography performed via hand injection with .

## 2019-02-11 NOTE — BRIEF OP NOTE
"Brief Operative Note  Date: 02/11/2019  Surgeon(s) and Role:   * Meir Valencia MD  Assit. 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  Meir Valencia MD [facility]  Discharge Note  SUMMARY    Admit Date: 2/11/2019    Attending Physician: Meir Valencia MD [facility]    Discharge Physician: Meir Valencia MD [facility]    Discharge Date: 02/11/2019    Final Diagnosis: ESRD (end stage renal disease) on dialysis [N18.6, Z99.2]    Outcome of Treatment: Successful PTA    Disposition: Home or self-care    Patient Instructions:   Current Discharge Medication List      CONTINUE these medications which have NOT CHANGED    Details   aspirin (ECOTRIN) 81 MG EC tablet Take 81 mg by mouth every morning.       acetaminophen (TYLENOL) 325 MG tablet Take 2 tablets (650 mg total) by mouth every 6 (six) hours as needed for Pain.  Refills: 0      albuterol (PROVENTIL) 2.5 mg /3 mL (0.083 %) nebulizer solution Take 3 mLs (2.5 mg total) by nebulization every 6 (six) hours as needed for Wheezing. Rescue  Qty: 25 each, Refills: 3    Associated Diagnoses: Chest congestion      alprazolam (XANAX ORAL) Take 0.5 mg by mouth as needed (anxiety).       atorvastatin (LIPITOR) 40 MG tablet TAKE 1 TABLET ONE TIME DAILY FOR CHOLESTEROL  Qty: 90 tablet, Refills: 2    Associated Diagnoses: Pure hypercholesterolemia      BD INSULIN PEN NEEDLE UF SHORT 31 gauge x 5/16" Ndle USE TO INJECT NOVOLOG FLEXPEN BEFORE MEALS  Qty: 150 each, Refills: 11 "      blood sugar diagnostic (ACCU-CHEK SMARTVIEW TEST STRIP) Strp 1 strip by Misc.(Non-Drug; Combo Route) route 2 (two) times daily.  Qty: 300 each, Refills: 3    Associated Diagnoses: Type 2 diabetes mellitus with chronic kidney disease      buPROPion (WELLBUTRIN XL) 150 MG TB24 tablet Take 1 tablet (150 mg total) by mouth once daily.  Qty: 30 tablet, Refills: 4    Associated Diagnoses: Depression, unspecified depression type      carvedilol (COREG) 25 MG tablet Take 1 tablet (25 mg total) by mouth 2 (two) times daily.  Qty: 180 tablet, Refills: 2    Associated Diagnoses: Hypertension, unspecified type      dantrolene (DANTRIUM) 50 MG Cap Take 1 capsule (50 mg total) by mouth 3 (three) times daily.  Qty: 270 capsule, Refills: 3      DULCOLAX, BISACODYL, ORAL Take by mouth as needed (constipation).      ergocalciferol (ERGOCALCIFEROL) 50,000 unit Cap 1 capsule (50,000 Units total) by Per G Tube route every 7 days.  Qty: 12 capsule, Refills: 2      famotidine (PEPCID) 20 MG tablet Take 1 tablet (20 mg total) by mouth once daily.  Qty: 90 tablet, Refills: 2    Associated Diagnoses: Hemorrhagic cerebrovascular accident (CVA)      ferrous sulfate 220 mg (44 mg iron)/5 mL solution 10ml daily for Iron/Give via PEG  Qty: 300 mL, Refills: 6    Associated Diagnoses: Anemia, unspecified type      folic acid (FOLVITE) 1 MG tablet Take 1 tablet (1 mg total) by mouth once daily.  Qty: 90 tablet, Refills: 2    Associated Diagnoses: Folate deficiency      furosemide (LASIX) 40 MG tablet Take 1 tablet (40 mg total) by mouth once daily.  Qty: 90 tablet, Refills: 2      insulin glargine (LANTUS U-100 INSULIN) 100 unit/mL injection 10 units daily in the PM  Qty: 10 mL, Refills: 6    Associated Diagnoses: Type 2 diabetes mellitus with chronic kidney disease on chronic dialysis, with long-term current use of insulin      levETIRAcetam (KEPPRA) 500 MG Tab Take 1 tablet (500 mg total) by mouth every 8 (eight) hours.  Qty: 270 tablet,  Refills: 2    Associated Diagnoses: Hemorrhagic cerebrovascular accident (CVA)      lidocaine-prilocaine (EMLA) cream Apply topically as needed.  Qty: 30 g, Refills: 1      polyethylene glycol 3350 (MIRALAX ORAL) Take by mouth every evening.       pregabalin (LYRICA) 25 MG capsule 1 cap in AM and 2 caps at Bedtime for muscle spasm  Qty: 90 capsule, Refills: 4    Associated Diagnoses: Muscle spasticity      sevelamer carbonate (RENVELA) 800 mg Tab Take 800 mg by mouth 3 (three) times daily with meals.      sodium bicarbonate 650 MG tablet Take 2 tablets (1,300 mg total) by mouth 2 (two) times daily.  Qty: 360 tablet, Refills: 2             Diet:  Resume pre-operative diet    Activity:  Ad darby    Follow-up:  Follow-up in clinic with Dr Valencia within 2 days; please call clinic nurse at

## 2019-02-11 NOTE — PROGRESS NOTES
2/11/19  Summary:  Follow up phone call to patient's spouse, Wilder who answers call while pt at HD, s/p fistulogram earlier today. Wilder reports pt has been doing okay but she is not happy with the walker sent out. Wilder reports it is a regular walker and not the yulissa walker that was ordered. Wilder reports pt's lower buttocks skin break down has been healing, closing-using Neosporin Oint. Wilder confirms receiving mailings from [facility]. He plans to f/u with SW (who has now close case--unable to reach pt/family).    Interventions:  Spoke with Sumanth at [facility]:  [phone] who rereads orders and sees it read yulissa walker. Plan is to exchange walkers to provide the correct yulissa walker tomorrow.   Informed spouse of plan and he is in agreement.   Encouraged spouse to reach out to OPCM LCSW with his concerns, her contact provided.   Spoke with daughter, Reina off today from work as reported by Wilder. Reina is out & requests call back at 4 pm today to complete med rec.   Call back attempted at 4 pm- left voice message, attempted again at 4:17 pm - mail box full. Unable to perform med rec. Meds last reviewed by Anusha Whipple RN at \[facility]\"" this AM 2/11/19.     Plan:  Med Rec with daughter., 2/11- continue to have difficulty completing med rec.  Coordinate with HD dietitian.   Decubitus -- Continue teaching to prevent.   DM- Continue teaching on DM mgmnt, BG checks/logs, s/s hypo/hyperglycemia.   Constipation-- Continue teaching to prevent constipation    [facility] was developed with the patients/caregivers input and was based on identified barriers from todays assessment.  Goals were written today with the patient/caregiver and the patient has agreed to work towards these goals to improve his/her overall well-being. Patient verbalized understanding of the care plan, goals, and all of today's instructions. Encouraged patient/caregiver to communicate with his/her physician and health care  team about health conditions and the treatment plan.  Provided my contact information today and encouraged patient/caregiver to call me with any questions as needed.

## 2019-02-11 NOTE — OP NOTE
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  The patient was brought to the Cath Lab, placed in supine. Arm was prepped and draped in the standard surgical fashion. Under ultrasound guidance, the proximal aspect of the RUE AVG was accessed with a micropuncture needle; ultrasound confirmed vessel patency, followed by placement of 4/3-Kisw[place]ili micropuncture dilator. Through this, an 0.035-inch wire was placed in the short 6-Kisw[place]ili sheath. Through this, an 0.035-inch Glidewire was placed through the high-grade stenosis, which was demonstrated by the angiogram.  The ultrasound demonstrated vessel patency. A high-grade stenosis in venous outflow was found, which was crossed with a hyrophilic 0.035-in glidewire. This was treated with PTA outflow stenosis with a 7x40mm [place] balloon high-pressure, noncompliant balloon. Resolution of the stenosis was noted. Strong thrill could be felt. The sheath was removed, 3-0 nylon U-stitch was placed with good hemostasis.    *MODERATE SEDATION IN CATH LAB   Meir Valencia MD FACS was present for moderate sedation  Dr. Valencia monitored the patients cardio respiratory functions during the moderate sedation   See nurses notes for Intra-service start and end times and for the log of the name of the RN who administered the medicines for the moderate sedation, including their  doseage and route.    Time of sedation:  45mins.      Meir Valencia MD FACS  Vascular/Endovascular Surgery

## 2019-02-11 NOTE — PROGRESS NOTES
Patient discharged per MD orders. Instructions given on medications, wound care, activity, signs of infection, when to call MD, and follow up appointments. Pt verbalized understanding.  Patient AAOx3, VSS, no c/o pain or discomfort at this time.  PIV removed. Patient left unit via wheelchair with family as escort.

## 2019-02-11 NOTE — Clinical Note
30 ml injected throughout the case. 70 mL total wasted during the case. 100 mL total used in the case.

## 2019-02-12 ENCOUNTER — TELEPHONE (OUTPATIENT)
Dept: INTERNAL MEDICINE | Facility: CLINIC | Age: 59
End: 2019-02-12

## 2019-02-13 ENCOUNTER — OUTPATIENT CASE MANAGEMENT (OUTPATIENT)
Dept: ADMINISTRATIVE | Facility: OTHER | Age: 59
End: 2019-02-13

## 2019-02-13 DIAGNOSIS — Z79.4 TYPE 2 DIABETES MELLITUS WITH CHRONIC KIDNEY DISEASE ON CHRONIC DIALYSIS, WITH LONG-TERM CURRENT USE OF INSULIN: ICD-10-CM

## 2019-02-13 DIAGNOSIS — E11.22 TYPE 2 DIABETES MELLITUS WITH CHRONIC KIDNEY DISEASE ON CHRONIC DIALYSIS, WITH LONG-TERM CURRENT USE OF INSULIN: ICD-10-CM

## 2019-02-13 DIAGNOSIS — N18.6 TYPE 2 DIABETES MELLITUS WITH CHRONIC KIDNEY DISEASE ON CHRONIC DIALYSIS, WITH LONG-TERM CURRENT USE OF INSULIN: ICD-10-CM

## 2019-02-13 DIAGNOSIS — Z99.2 TYPE 2 DIABETES MELLITUS WITH CHRONIC KIDNEY DISEASE ON CHRONIC DIALYSIS, WITH LONG-TERM CURRENT USE OF INSULIN: ICD-10-CM

## 2019-02-13 NOTE — TELEPHONE ENCOUNTER
I have seen her for pre op evaluation in September 2018   Followed until Dec 2018   Currently , I do not see any plan for elective  gall bladder removal surgery  Will be glad to be re involved as needed   Most recent Hb improved compared to Hb from Sept 24 th 2018

## 2019-02-13 NOTE — PROGRESS NOTES
2/13/19  Summary:  Telephonic follow-up to patient's spouse. Spouse confirms pt now has yulissa walker & meet pt's satisfaction. Regular walker retrieved by [employer]. Efforts continue to complete Med Rec. Spoke with spouse who says he thinks he can answer what pt is taking. Spouse confirms pt has been wearing the left hand splint. Spouse agrees to f/u from RN CM in 2 wks.     Interventions:  MED REC reviewed--- question is left regarding pt taking dantrolene 50 mg TID for spasticity, to aide in left hand positioning into splint.   Instructed spouse to please speak with his daughterReina to check that pt is getting dantrolene. Spouse states he will have daughter call this RN CM first thing in AM.     Plan:  Resources---DME-completed 2/13.  Med Rec--  clarification with daughter needed on dantrolene rx. Await f/u call from daughter 2/13  Decubitus -- Continue teaching to prevent.   DM- Continue teaching on DM mgmnt, BG checks/logs, s/s hypo/hyperglycemia.   Constipation-- Continue teaching to prevent constipation.       Todays OPCM Self-Management Care Plan was developed with the patients/caregivers input and was based on identified barriers from todays assessment.  Goals were written today with the patient/caregiver and the patient has agreed to work towards these goals to improve his/her overall well-being. Patient verbalized understanding of the care plan, goals, and all of today's instructions. Encouraged patient/caregiver to communicate with his/her physician and health care team about health conditions and the treatment plan.  Provided my contact information today and encouraged patient/caregiver to call me with any questions as needed.

## 2019-02-14 ENCOUNTER — TELEPHONE (OUTPATIENT)
Dept: VASCULAR SURGERY | Facility: CLINIC | Age: 59
End: 2019-02-14

## 2019-02-14 DIAGNOSIS — Z99.2 ESRD (END STAGE RENAL DISEASE) ON DIALYSIS: Primary | ICD-10-CM

## 2019-02-14 DIAGNOSIS — N18.6 ESRD (END STAGE RENAL DISEASE) ON DIALYSIS: Primary | ICD-10-CM

## 2019-02-14 NOTE — TELEPHONE ENCOUNTER
----- Message from Paige Coburn sent at 2/14/2019 10:03 AM CST -----  Contact: Pt.Daughter  [phone] (call anytime)  Needs Advice    Reason for call:   states she is still waiting on a call from nurse in reference to finding out when her mothers Sutters will be removed         Communication Preference:  Pt.Daughter  [phone] (call anytime)    Additional Information:    Thank You

## 2019-02-21 ENCOUNTER — OFFICE VISIT (OUTPATIENT)
Dept: VASCULAR SURGERY | Facility: CLINIC | Age: 59
End: 2019-02-21
Payer: MEDICARE

## 2019-02-21 ENCOUNTER — HOSPITAL ENCOUNTER (OUTPATIENT)
Dept: VASCULAR SURGERY | Facility: CLINIC | Age: 59
Discharge: HOME | End: 2019-02-21
Attending: SURGERY
Payer: MEDICARE

## 2019-02-21 VITALS
SYSTOLIC BLOOD PRESSURE: 121 MMHG | HEART RATE: 67 BPM | TEMPERATURE: 99 F | HEIGHT: 63 IN | DIASTOLIC BLOOD PRESSURE: 76 MMHG | BODY MASS INDEX: 31.25 KG/M2 | WEIGHT: 176.38 LBS

## 2019-02-21 DIAGNOSIS — N18.6 ESRD (END STAGE RENAL DISEASE) ON DIALYSIS: Primary | ICD-10-CM

## 2019-02-21 DIAGNOSIS — Z99.2 ESRD (END STAGE RENAL DISEASE) ON DIALYSIS: ICD-10-CM

## 2019-02-21 DIAGNOSIS — N18.6 ESRD (END STAGE RENAL DISEASE) ON DIALYSIS: ICD-10-CM

## 2019-02-21 DIAGNOSIS — Z99.2 ESRD (END STAGE RENAL DISEASE) ON DIALYSIS: Primary | ICD-10-CM

## 2019-02-21 PROCEDURE — 3008F PR BODY MASS INDEX (BMI) DOCUMENTED: ICD-10-PCS | Mod: HCNC,CPTII,S$GLB, | Performed by: SURGERY

## 2019-02-21 PROCEDURE — 3008F BODY MASS INDEX DOCD: CPT | Mod: HCNC,CPTII,S$GLB, | Performed by: SURGERY

## 2019-02-21 PROCEDURE — 99999 PR PBB SHADOW E&M-EST. PATIENT-LVL III: ICD-10-PCS | Mod: PBBFAC,HCNC,, | Performed by: SURGERY

## 2019-02-21 PROCEDURE — 93990 DOPPLER FLOW TESTING: CPT | Mod: HCNC,S$GLB,, | Performed by: SURGERY

## 2019-02-21 PROCEDURE — 93990 PR DUPLEX HEMODIALYSIS ACCESS: ICD-10-PCS | Mod: HCNC,S$GLB,, | Performed by: SURGERY

## 2019-02-21 PROCEDURE — 99999 PR PBB SHADOW E&M-EST. PATIENT-LVL III: CPT | Mod: PBBFAC,HCNC,, | Performed by: SURGERY

## 2019-02-21 PROCEDURE — 99214 OFFICE O/P EST MOD 30 MIN: CPT | Mod: HCNC,S$GLB,, | Performed by: SURGERY

## 2019-02-21 PROCEDURE — 99214 PR OFFICE/OUTPT VISIT, EST, LEVL IV, 30-39 MIN: ICD-10-PCS | Mod: HCNC,S$GLB,, | Performed by: SURGERY

## 2019-02-21 NOTE — PROGRESS NOTES
Lucy Burgos Chirs  02/21/2019    HPI:   Patient is a 58 y.o. female with a history of HTN, HLD, L hemiplegia after large stroke in 2016, DM II, stage V CKD since August 2018 MWF for f/u  [facility] HD well    She has a residual impairment on her left upper extremity and left lower extremity from her stroke and is currently wheel chair bound. She has complete function of her right arm currently. She is right handed. No history of trauma to right arm.   Was seeing Dr. Blank for nephology but he no longer works at [facility].     s/p  10/18/18  RUE AVG creation, gore interring 4-7mm tapered brachial artery/vein  2/11/19: PTA outflow 7x40 mm  *Does have a chronic central stenosis in R brachiocephalic vein and SVC    Findings/Key Components:  Fair thrill palpable throughout AVG  SBP 90s - 100s dev-op  2+ R radial pulse; minimal augmentation from strong biphasic to triphasic in R radial and ulnar doppler signals with AVG compression  Hold nifedipine post-op    Retired  at [employer].     No MI, but history of stroke in 2016 (hemorragic)   Tobacco use: never use    Renal is Dr LING Perez    Past Medical History:   Diagnosis Date    Anemia of chronic disease 6/10/2017    Anxiety     Arthritis of right acromioclavicular joint 7/2/2014    Asthma     Bipolar disorder     Bronchitis, acute     Cataract     Cholelithiasis     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    Cortical cataract of both eyes 7/26/2016    Decubitus ulcer of buttock, stage 2     Degeneration of lumbar or lumbosacral intervertebral disc 3/5/2013    S/p MRI L-spine 5/2009     Depression     ESRD on hemodialysis     Started August 2018    General anesthetics causing adverse effect in therapeutic use     Hemorrhagic cerebrovascular accident (CVA)     8/2016 s/p Hemicraniotomy at [facility] with Left hemiparesis    Hemorrhagic cerebrovascular accident (CVA) 1/3/2018    History of stroke 6/28/2017    s/p  R-MCA stroke with R-putaminal hemorrhagic transformation in 8/2016 and 11/2016 (s/p hemicraniotomy at [facility]) with residual L hemiparesis, on AED s/p CVA      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    Hyperlipidemia     Hypertensive retinopathy of both eyes 7/26/2016    Impingement syndrome of right shoulder 7/2/2014    Left ventricular diastolic dysfunction with preserved systolic function 12/15/2015    Mixed anxiety and depressive disorder 3/5/2013    Obesity     THERESA (obstructive sleep apnea) 3/5/2013    No Home CPAP 2ndary to cost     Partial symptomatic epilepsy with complex partial seizures, not intractable, without status epilepticus     PEG (percutaneous endoscopic gastrostomy) status 6/28/2017    Renovascular hypertension 3/5/2013    Will resume home medications: amlodipine, labetalol, and hydralazine Will hold Lisinopril in setting of DARLING.   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 without macular edema, without long-term current use of insulin 8/2/2017    Type 2 diabetes mellitus with diabetic polyneuropathy, without long-term current use of insulin 10/22/2015    Type 2 diabetes mellitus with stage 3 chronic kidney disease, without long-term current use of insulin 10/22/2015    Vertebral artery stenosis 3/5/2013    S/p Stenting per Dr Burnett      Past Surgical History:   Procedure Laterality Date    ARTHROSCOPY-SHOULDER WITH SUBACROMIAL DECOMPRESSION Right 7/9/2014    [facility]    AV GRAFT CREATION Right 10/18/2018    Performed by Meir Valencia [facility] at [facility] OR 2ND FLR    Biceps Tenotomy Right 7/9/2014    Performed by Kendall LAMBERT  "MD Kit at [facility] OR    BREAST SURGERY      breast reduction    COLONOSCOPY N/A 8/11/2016    Performed by Jerry [facility]chis [facility] at [facility] ENDO (4TH FLR)    DEBRIDEMENT-SHOULDER-ARTHROSCOPIC Labral Cuff Right 7/9/2014    Performed by Kendall Pantoja MD at [facility] OR    ESOPHAGOGASTRODUODENOSCOPY (EGD) N/A 12/6/2017    [facility] by Dallas Lock Jr., [facility] ENDO    DRHFCTER-[id number]-[id number] END Right 7/9/2014    Performed by Kendall Pantoja MD at [facility] OR    Fistulogram Right 2/11/2019    Performed by [facility]ir Valencia [facility] at [facility] CATH LAB    HYSTERECTOMY      ROTATOR CUFF REPAIR Right July 9, 2014    right side    Skull surgery      Aneurysm    stent placed      in vertebral artery    TOTAL REDUCTION MAMMOPLASTY      TUBAL LIGATION       Family History   Problem Relation Age of Onset    Cancer Mother 55        Breast cancer    Diabetes Mother     Hypertension Mother     Heart disease Mother     Heart attack Mother     Breast cancer Mother     Stroke Sister     Hypertension Sister     Sleep apnea Sister     No Known Problems Daughter     Diabetes Son         controlled    Bell's palsy Sister     Lupus Sister     Blindness Maternal Grandmother     Diabetes Unknown         "My entire family family has diabetes"    Stroke Maternal Aunt     Amblyopia Neg Hx     Cataracts Neg Hx     Glaucoma Neg Hx     Macular degeneration Neg Hx     Retinal detachment Neg Hx     Strabismus Neg Hx      Social History     Socioeconomic History    Marital status:      Spouse name: Not on file    Number of children: Not on file    Years of education: Not on file    Highest education level: Not on file   Social Needs    Financial resource strain: Not on file    Food insecurity - worry: Not on file    Food insecurity - inability: Not on file    Transportation needs - medical: Not on file    Transportation needs - non-medical: Not on file   Occupational History    Not on file " "  Tobacco Use    Smoking status: Never Smoker    Smokeless tobacco: Never Used   Substance and Sexual Activity    Alcohol use: No     Comment: occasional wine cooler     Drug use: No    Sexual activity: Yes     Partners: Male   Other Topics Concern    Not on file   Social History Narrative    . 2 children(Wilder and Reina). Does not work. Previously worked as a .      Current Outpatient Medications on File Prior to Visit   Medication Sig    acetaminophen (TYLENOL) 325 MG tablet Take 2 tablets (650 mg total) by mouth every 6 (six) hours as needed for Pain.    alprazolam (XANAX ORAL) Take 0.5 mg by mouth as needed (anxiety).     aspirin (ECOTRIN) 81 MG EC tablet Take 81 mg by mouth every morning.     atorvastatin (LIPITOR) 40 MG tablet TAKE 1 TABLET ONE TIME DAILY FOR CHOLESTEROL (Patient taking differently: Take 40 mg by mouth every evening. TAKE 1 TABLET ONE TIME DAILY FOR CHOLESTEROL)    BD INSULIN PEN NEEDLE UF SHORT 31 gauge x 5/16" Ndle USE TO INJECT NOVOLOG FLEXPEN BEFORE MEALS    blood sugar diagnostic (ACCU-CHEK SMARTVIEW TEST STRIP) Strp 1 strip by Misc.(Non-Drug; Combo Route) route 2 (two) times daily.    buPROPion (WELLBUTRIN XL) 150 MG TB24 tablet Take 1 tablet (150 mg total) by mouth once daily.    carvedilol (COREG) 25 MG tablet Take 1 tablet (25 mg total) by mouth 2 (two) times daily.    dantrolene (DANTRIUM) 50 MG Cap Take 1 capsule (50 mg total) by mouth 3 (three) times daily.    DULCOLAX, BISACODYL, ORAL Take by mouth as needed (constipation).    ergocalciferol (ERGOCALCIFEROL) 50,000 unit Cap 1 capsule (50,000 Units total) by Per G Tube route every 7 days. (Patient taking differently: Take 50,000 Units by mouth every 7 days. Takes on Thurs)    famotidine (PEPCID) 20 MG tablet Take 1 tablet (20 mg total) by mouth once daily.    ferrous sulfate 220 mg (44 mg iron)/5 mL solution 10ml daily for Iron/Give via PEG (Patient taking differently: Take 220 mg by " mouth every morning. )    folic acid (FOLVITE) 1 MG tablet Take 1 tablet (1 mg total) by mouth once daily.    furosemide (LASIX) 40 MG tablet Take 1 tablet (40 mg total) by mouth once daily. (Patient taking differently: Take 40 mg by mouth every morning. )    insulin glargine (LANTUS U-100 INSULIN) 100 unit/mL injection 10 units daily in the PM    levETIRAcetam (KEPPRA) 500 MG Tab Take 1 tablet (500 mg total) by mouth every 8 (eight) hours.    lidocaine-prilocaine (EMLA) cream Apply topically as needed.    polyethylene glycol 3350 (MIRALAX ORAL) Take by mouth every evening.     pregabalin (LYRICA) 25 MG capsule 1 cap in AM and 2 caps at Bedtime for muscle spasm    sevelamer carbonate (RENVELA) 800 mg Tab Take 800 mg by mouth 3 (three) times daily with meals.    sodium bicarbonate 650 MG tablet Take 2 tablets (1,300 mg total) by mouth 2 (two) times daily.    albuterol (PROVENTIL) 2.5 mg /3 mL (0.083 %) nebulizer solution Take 3 mLs (2.5 mg total) by nebulization every 6 (six) hours as needed for Wheezing. Rescue     Current Facility-Administered Medications on File Prior to Visit   Medication    onabotulinumtoxina injection 300 Units       REVIEW OF SYSTEMS:  General: negative; ENT: negative; Allergy and Immunology: negative; Hematological and Lymphatic: Negative; Endocrine: negative; Respiratory: no cough, shortness of breath, or wheezing; Cardiovascular: no chest pain or dyspnea on exertion; Gastrointestinal: no abdominal pain/back, change in bowel habits, or bloody stools; Genito-Urinary: no dysuria, trouble voiding, or hematuria; Musculoskeletal: negative  Neurological: no TIA or stroke symptoms    PHYSICAL EXAM:       Vitals:    02/21/19 1315   BP: 121/76   Pulse: 67   Temp: 98.5 °F (36.9 °C)       General appearance:  Alert, well-appearing, and in no distress.  Oriented to person, place, and time   Neurological: Normal speech, no focal findings noted; CN II - XII grossly intact            Musculoskeletal: Digits/nail without cyanosis/clubbing.  Normal muscle strength/tone.                 Neck: Supple, no significant adenopathy; thyroid is not enlarged                  No carotid bruit can be auscultated                Chest:  Clear to auscultation, no wheezes, rales or rhonchi, symmetric air entry right permacath     No use of accessory muscles             Cardiac: Normal rate and regular rhythm, S1 and S2 normal; PMI non-displaced          Abdomen: Soft, nontender, nondistended, no masses or organomegaly     No rebound tenderness noted; bowel sounds normal     Pulsatile aortic mass is no palpable.     No groin adenopathy      Extremities:   RUE VAG with soft thrill     Non-palpable R radial pulse (pre-op 2+ R radial pulse)     Positive Davidson's test        R hand - motor/sensitive intact    LAB RESULTS:  Lab Results   Component Value Date    K 4.2 01/15/2019    K 4.3 12/08/2018    K 4.0 08/31/2018    CREATININE 5.8 (H) 01/15/2019    CREATININE 4.2 (H) 12/08/2018    CREATININE 3.9 (H) 08/31/2018     Lab Results   Component Value Date    WBC 4.66 01/10/2019    WBC 5.03 12/08/2018    WBC 7.62 08/31/2018    HCT 45.8 01/10/2019    HCT 37.4 12/08/2018    HCT 40 12/08/2018     (L) 01/10/2019     12/08/2018     08/31/2018     Lab Results   Component Value Date    HGBA1C 8.5 (H) 01/15/2019    HGBA1C 5.7 (H) 08/31/2018    HGBA1C 5.6 08/10/2018     IMAGING:  Previous:  AVG u/s: flow vol 869 ml/min (previous 858 ml/min)  No stenosis    Previous:  + outflow stenosis w  cm/s    Vein mapping:  Right- cephalic medial forearm 4.1, basilic antecubital 6.3  Left- cpehalic vein 2.0, antecubital fossa 3.6    IMP/PLAN:  58 y.o. female with a history of HTN, HLD, L hemiplegia after large stroke in 2016, DM II, stage V CKD since August 2018 - doing well s/p creation of RUE AVG    2/11/19: PTA outflow 7x40 mm  *Does have a chronic central stenosis in R brachiocephalic vein and SVC - no R arm  edema so will not rx for now    Increase ASA 81 po bid  Follow-up with me in 6 months for PE and u/s surveillance; sooner should issues arise    Meir Valencia MD FACS  Vascular/Endovascular Surgery

## 2019-03-11 ENCOUNTER — OUTPATIENT CASE MANAGEMENT (OUTPATIENT)
Dept: ADMINISTRATIVE | Facility: OTHER | Age: 59
End: 2019-03-11

## 2019-03-11 NOTE — PROGRESS NOTES
3/11/19  Summary:  Telephonic follow-up to patient's spouse. Pt s/p HD today. Pt reports to be stable in her health. Spouse denies any pt falls. Pt is reported to be eating with good appetite, following a healthy meal plan and speaking with the HD dietitian weekly. Pt without s/s hypoglycemia--weakness, shakiness, sweating or hyperglycemia- increased thirst/hunger/urination. Pt's daughter did not call RN ANTONELLA back. Spouse states he believes pt is taking dantrolene (Dantrium) TID for spasticity. Spouse is surprised that pt's initial OP PT appt is this THurs 3/14. He says that his daughter has the address to appt. He will plan to bring pt to appt but thereafter plan for [employer] transport. Spouse reports BGs checked once a day ie =175, 185s taken at night before Lantus given. Spouse reports pt has all DME needed ie air mattress for bed, yulissa walker except for a donut pillow that he still needs to get. Spouse denies skin breakdowns and reports regular position changes for pt.  Spouse reports pt is drinking 4 - 8 oz glasses per day (per CHF, ESRD guidelines) and including fiber in diet.    Interventions:  Instructed spouse to check BGs 2 x day per orders, to check at varying times of day ie ac breakfast/lunch/supper, 2 hrs postprandial, HS and prn.   Reinforced c/gs efforts as he reports--- DME in place (except for a donut pillow), rotation of position and eating balanced diet to prevent skin break downs in bed and chair bound pt.   Reinforced c/gs report of pt is drinking 4 - 8 oz glasses per day (per CHF, ESRD guidelines) and including fiber in diet.    Plan:  F/u on OP PT.  Decubitus -- Continue teaching to prevent.   DM- Continue teaching on DM mgmnt, BG checks/logs, s/s hypo/hyperglycemia.   Constipation-- Continue teaching to prevent constipation.         Todays OPCM Self-Management Care Plan was developed with the patients/caregivers input and was based on identified barriers from todays assessment.  Goals were  written today with the patient/caregiver and the patient has agreed to work towards these goals to improve his/her overall well-being. Patient verbalized understanding of the care plan, goals, and all of today's instructions. Encouraged patient/caregiver to communicate with his/her physician and health care team about health conditions and the treatment plan.  Provided my contact information today and encouraged patient/caregiver to call me with any questions as needed.

## 2019-03-26 ENCOUNTER — TELEPHONE (OUTPATIENT)
Dept: INTERNAL MEDICINE | Facility: CLINIC | Age: 59
End: 2019-03-26

## 2019-03-28 ENCOUNTER — OFFICE VISIT (OUTPATIENT)
Dept: PODIATRY | Facility: CLINIC | Age: 59
End: 2019-03-28
Payer: MEDICARE

## 2019-03-28 ENCOUNTER — TELEPHONE (OUTPATIENT)
Dept: INTERNAL MEDICINE | Facility: CLINIC | Age: 59
End: 2019-03-28

## 2019-03-28 VITALS — WEIGHT: 176 LBS | BODY MASS INDEX: 28.28 KG/M2 | HEIGHT: 66 IN | RESPIRATION RATE: 18 BRPM

## 2019-03-28 DIAGNOSIS — M25.50 ARTHRALGIA, UNSPECIFIED JOINT: Primary | ICD-10-CM

## 2019-03-28 DIAGNOSIS — L60.9 DISEASE OF NAIL: ICD-10-CM

## 2019-03-28 DIAGNOSIS — E11.49 TYPE II DIABETES MELLITUS WITH NEUROLOGICAL MANIFESTATIONS: Primary | ICD-10-CM

## 2019-03-28 PROCEDURE — 99999 PR PBB SHADOW E&M-EST. PATIENT-LVL III: CPT | Mod: PBBFAC,HCNC,, | Performed by: PODIATRIST

## 2019-03-28 PROCEDURE — 11721 PR DEBRIDEMENT OF NAILS, 6 OR MORE: ICD-10-PCS | Mod: Q9,HCNC,S$GLB, | Performed by: PODIATRIST

## 2019-03-28 PROCEDURE — 99499 UNLISTED E&M SERVICE: CPT | Mod: HCNC,S$GLB,, | Performed by: PODIATRIST

## 2019-03-28 PROCEDURE — 11721 DEBRIDE NAIL 6 OR MORE: CPT | Mod: Q9,HCNC,S$GLB, | Performed by: PODIATRIST

## 2019-03-28 PROCEDURE — 99499 NO LOS: ICD-10-PCS | Mod: HCNC,S$GLB,, | Performed by: PODIATRIST

## 2019-03-28 PROCEDURE — 99999 PR PBB SHADOW E&M-EST. PATIENT-LVL III: ICD-10-PCS | Mod: PBBFAC,HCNC,, | Performed by: PODIATRIST

## 2019-03-28 NOTE — TELEPHONE ENCOUNTER
Isaac, pt has a history of Seronegative Rheumatoid arthritis in the past so I did place the Rheumatology referral, but let pt know that it may take a while to get an appt.  Please let Ms Ayala know also  that I'm adding labs to check on her joint pain to her 4/18 lab appt.  Donn add ordered labs to 4/18 lab work.  Thanks

## 2019-03-28 NOTE — TELEPHONE ENCOUNTER
----- Message from Florencio Nickerson sent at 3/28/2019 10:30 AM CDT -----  Contact: Yvan/ Reina [phone]  The patient is requesting a referral to an [facility] Rheumatologist because her joints are stiff and painful.    Thank you

## 2019-03-28 NOTE — PROGRESS NOTES
Subjective:      Patient ID: Lucy Perez is a 58 y.o. female.    Chief Complaint: PCP (Beverly Muniz MD  1/15/19); Diabetic Foot Exam; Nail Care; and Foot Pain (left foot )    Lucy is a 58 y.o. female who presents to the clinic for evaluation and treatment of high risk feet. Lucy has a past medical history of Anemia of chronic disease (6/10/2017), Anxiety, Arthritis of right acromioclavicular joint (7/2/2014), Asthma, Bipolar disorder, Bronchitis, acute, Cataract, Cholelithiasis, 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 (10/22/2016), Cortical cataract of both eyes (7/26/2016), Decubitus ulcer of buttock, stage 2, Degeneration of lumbar or lumbosacral intervertebral disc (3/5/2013), Depression, ESRD on hemodialysis, General anesthetics causing adverse effect in therapeutic use, Hemorrhagic cerebrovascular accident (CVA), Hemorrhagic cerebrovascular accident (CVA) (1/3/2018), History of stroke (6/28/2017), 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), Hyperlipidemia, Hypertensive retinopathy of both eyes (7/26/2016), Impingement syndrome of right shoulder (7/2/2014), Left ventricular diastolic dysfunction with preserved systolic function (12/15/2015), Mixed anxiety and depressive disorder (3/5/2013), Obesity, THERESA (obstructive sleep apnea) (3/5/2013), Partial symptomatic epilepsy with complex partial seizures, not intractable, without status epilepticus, PEG (percutaneous endoscopic gastrostomy) status (6/28/2017), Renovascular hypertension (3/5/2013), Rheumatoid arthritis(714.0), Rotator cuff tear (7/2/2014), S/P breast biopsy, left (3/5/2013), S/P R shoulder surgery, SAD, DCE, biceps tenotomy (7/15/2014), S/P total hysterectomy (7/10/2013), Sarcoidosis, Stroke (2016), Type 2 diabetes mellitus with both eyes affected by moderate nonproliferative retinopathy without macular edema, without long-term current use of insulin (8/2/2017), Type 2 diabetes mellitus with diabetic polyneuropathy,  without long-term current use of insulin (10/22/2015), Type 2 diabetes mellitus with stage 3 chronic kidney disease, without long-term current use of insulin (10/22/2015), and Vertebral artery stenosis (3/5/2013). The patient's chief complaint is long, thick toenails. This patient has documented high risk feet requiring routine maintenance secondary to diabetes mellitis and those secondary complications of diabetes, as mentioned..    PCP: Beverly Muniz MD    Date Last Seen by PCP:   Chief Complaint   Patient presents with    PCP     Beverly Muniz MD  1/15/19    Diabetic Foot Exam    Nail Care    Foot Pain     left foot          Hemoglobin A1C   Date Value Ref Range Status   01/15/2019 8.5 (H) 4.0 - 5.6 % Final     Comment:     ADA Screening Guidelines:  5.7-6.4%  Consistent with prediabetes  >or=6.5%  Consistent with diabetes  High levels of fetal hemoglobin interfere with the HbA1C  assay. Heterozygous hemoglobin variants (HbS, HgC, etc)do  not significantly interfere with this assay.   However, presence of multiple variants may affect accuracy.     08/31/2018 5.7 (H) 4.0 - 5.6 % Final     Comment:     ADA Screening Guidelines:  5.7-6.4%  Consistent with prediabetes  >or=6.5%  Consistent with diabetes  High levels of fetal hemoglobin interfere with the HbA1C  assay. Heterozygous hemoglobin variants (HbS, HgC, etc)do  not significantly interfere with this assay.   However, presence of multiple variants may affect accuracy.     08/10/2018 5.6 4.0 - 5.6 % Final     Comment:     ADA Screening Guidelines:  5.7-6.4%  Consistent with prediabetes  >or=6.5%  Consistent with diabetes  High levels of fetal hemoglobin interfere with the HbA1C  assay. Heterozygous hemoglobin variants (HbS, HgC, etc)do  not significantly interfere with this assay.   However, presence of multiple variants may affect accuracy.         Review of Systems   Constitution: Negative for chills, decreased appetite and fever.   Cardiovascular:  Negative for leg swelling.   Skin: Positive for dry skin and nail changes.   Musculoskeletal: Positive for muscle weakness (L sided). Negative for arthritis, joint pain, joint swelling and myalgias.   Gastrointestinal: Negative for nausea and vomiting.   Neurological: Positive for focal weakness, loss of balance and weakness. Negative for numbness and paresthesias.           Objective:      Physical Exam   Constitutional: She is oriented to person, place, and time. She appears well-developed and well-nourished.   Cardiovascular:   Dorsalis pedis and posterior tibial pulses are diminished Bilaterally. Toes are cool to touch. Feet are warm proximally.There is decreased digital hair. Skin is atrophic, slightly hyperpigmented, and mildly edematous       Musculoskeletal: Normal range of motion. She exhibits no edema or tenderness.   Adequate joint range of motion without pain, limitation, nor crepitation Bilateral feet and ankle joints. Muscle strength is 5/5 in all groups bilaterally.         Neurological: She is alert and oriented to person, place, and time.   [place] 5.07 monofilamant testing is diminished Yovani feet. Sharp/dull sensation diminished Bilaterally. Light touch absent Bilaterally.       Skin: Skin is warm, dry and intact. No ecchymosis and no lesion noted. No erythema.   Nails x10 are elongated by  2-5mm's, thickened by 2-3 mm's, dystrophic, and are darkened in  coloration . Xerosis Bilaterally. No open lesions noted.       Psychiatric: She has a normal mood and affect. Her behavior is normal.             Assessment:       Encounter Diagnoses   Name Primary?    Type II diabetes mellitus with neurological manifestations Yes    Disease of nail          Plan:       Lucy was seen today for pcp, diabetic foot exam, nail care and foot pain.    Diagnoses and all orders for this visit:    Type II diabetes mellitus with neurological manifestations    Disease of nail      I counseled the patient on her  conditions, their implications and medical management.        - Shoe inspection. Diabetic Foot Education. Patient reminded of the importance of good nutrition and blood sugar control to help prevent podiatric complications of diabetes. Patient instructed on proper foot hygeine. We discussed wearing proper shoe gear, daily foot inspections, never walking without protective shoe gear, never putting sharp instruments to feet, routine podiatric nail visits every 2-3 months.      - With patient's permission, nails were aggressively reduced and debrided x 10 to their soft tissue attachment mechanically and with electric , removing all offending nail and debris. Patient relates relief following the procedure. She will continue to monitor the areas daily, inspect her feet, wear protective shoe gear when ambulatory, moisturizer to maintain skin integrity and follow in this office in approximately 2-3 months, sooner p.r.n.

## 2019-03-29 ENCOUNTER — OUTPATIENT CASE MANAGEMENT (OUTPATIENT)
Dept: ADMINISTRATIVE | Facility: OTHER | Age: 59
End: 2019-03-29

## 2019-03-29 NOTE — PROGRESS NOTES
3/29/19  Summary:  Telephonic follow-up to patient to review DM management. Pt answers call with weak voice. Pt says she is in the HD lobby waiting for [facility] transport home. Pt reports that she was not contacted for the OP PT. Per Epic - pt was a no show for 3/14 OP PT appt. Pt states she wants to have a kidney transplant since it is so hard be on dialysis and that she told the nurse at dialysis. Pt is waiting to hear from transplant.  Per Epic- noted referral to transplant department.     Interventions:  Pt unable to engage in much conversation today/  In basket message to Dr. Morataya to update her in pt's transplant request. Next PCP appt 4/25.       Plan:  Complete DM management teaching.  Plan to close case.     Todays OPCM Self-Management Care Plan was developed with the patients/caregivers input and was based on identified barriers from todays assessment.  Goals were written today with the patient/caregiver and the patient has agreed to work towards these goals to improve his/her overall well-being. Patient verbalized understanding of the care plan, goals, and all of today's instructions. Encouraged patient/caregiver to communicate with his/her physician and health care team about health conditions and the treatment plan.  Provided my contact information today and encouraged patient/caregiver to call me with any questions as needed.

## 2019-04-03 ENCOUNTER — OUTPATIENT CASE MANAGEMENT (OUTPATIENT)
Dept: ADMINISTRATIVE | Facility: OTHER | Age: 59
End: 2019-04-03

## 2019-04-11 ENCOUNTER — LAB VISIT (OUTPATIENT)
Dept: LAB | Facility: HOSPITAL | Age: 59
End: 2019-04-11
Attending: INTERNAL MEDICINE
Payer: MEDICARE

## 2019-04-11 ENCOUNTER — TELEPHONE (OUTPATIENT)
Dept: INTERNAL MEDICINE | Facility: CLINIC | Age: 59
End: 2019-04-11

## 2019-04-11 DIAGNOSIS — I10 ESSENTIAL HYPERTENSION: ICD-10-CM

## 2019-04-11 DIAGNOSIS — Z79.4 TYPE 2 DIABETES MELLITUS WITH CHRONIC KIDNEY DISEASE ON CHRONIC DIALYSIS, WITH LONG-TERM CURRENT USE OF INSULIN: ICD-10-CM

## 2019-04-11 DIAGNOSIS — M25.50 ARTHRALGIA, UNSPECIFIED JOINT: ICD-10-CM

## 2019-04-11 DIAGNOSIS — Z99.2 TYPE 2 DIABETES MELLITUS WITH CHRONIC KIDNEY DISEASE ON CHRONIC DIALYSIS, WITH LONG-TERM CURRENT USE OF INSULIN: ICD-10-CM

## 2019-04-11 DIAGNOSIS — E11.22 TYPE 2 DIABETES MELLITUS WITH CHRONIC KIDNEY DISEASE ON CHRONIC DIALYSIS, WITH LONG-TERM CURRENT USE OF INSULIN: ICD-10-CM

## 2019-04-11 DIAGNOSIS — N18.6 TYPE 2 DIABETES MELLITUS WITH CHRONIC KIDNEY DISEASE ON CHRONIC DIALYSIS, WITH LONG-TERM CURRENT USE OF INSULIN: ICD-10-CM

## 2019-04-11 LAB
ALBUMIN SERPL BCP-MCNC: 3.7 G/DL (ref 3.5–5.2)
ALP SERPL-CCNC: 75 U/L (ref 55–135)
ALT SERPL W/O P-5'-P-CCNC: <5 U/L (ref 10–44)
ANION GAP SERPL CALC-SCNC: 13 MMOL/L (ref 8–16)
AST SERPL-CCNC: 10 U/L (ref 10–40)
BASOPHILS # BLD AUTO: 0.06 K/UL (ref 0–0.2)
BASOPHILS NFR BLD: 1.1 % (ref 0–1.9)
BILIRUB SERPL-MCNC: 0.9 MG/DL (ref 0.1–1)
BUN SERPL-MCNC: 57 MG/DL (ref 6–20)
CALCIUM SERPL-MCNC: 9.6 MG/DL (ref 8.7–10.5)
CCP AB SER IA-ACNC: 0.8 U/ML
CHLORIDE SERPL-SCNC: 95 MMOL/L (ref 95–110)
CHOLEST SERPL-MCNC: 262 MG/DL (ref 120–199)
CHOLEST/HDLC SERPL: 4.2 {RATIO} (ref 2–5)
CO2 SERPL-SCNC: 32 MMOL/L (ref 23–29)
CREAT SERPL-MCNC: 6.1 MG/DL (ref 0.5–1.4)
CRP SERPL-MCNC: 2.4 MG/L (ref 0–8.2)
DIFFERENTIAL METHOD: ABNORMAL
EOSINOPHIL # BLD AUTO: 0.2 K/UL (ref 0–0.5)
EOSINOPHIL NFR BLD: 2.7 % (ref 0–8)
ERYTHROCYTE [DISTWIDTH] IN BLOOD BY AUTOMATED COUNT: 14.2 % (ref 11.5–14.5)
ERYTHROCYTE [SEDIMENTATION RATE] IN BLOOD BY WESTERGREN METHOD: 33 MM/HR (ref 0–36)
EST. GFR  (AFRICAN AMERICAN): 7.9 ML/MIN/1.73 M^2
EST. GFR  (NON AFRICAN AMERICAN): 6.9 ML/MIN/1.73 M^2
ESTIMATED AVG GLUCOSE: 169 MG/DL (ref 68–131)
GLUCOSE SERPL-MCNC: 207 MG/DL (ref 70–110)
HBA1C MFR BLD HPLC: 7.5 % (ref 4–5.6)
HCT VFR BLD AUTO: 35.1 % (ref 37–48.5)
HDLC SERPL-MCNC: 62 MG/DL (ref 40–75)
HDLC SERPL: 23.7 % (ref 20–50)
HGB BLD-MCNC: 10.8 G/DL (ref 12–16)
IMM GRANULOCYTES # BLD AUTO: 0.02 K/UL (ref 0–0.04)
IMM GRANULOCYTES NFR BLD AUTO: 0.4 % (ref 0–0.5)
LDLC SERPL CALC-MCNC: 179.8 MG/DL (ref 63–159)
LYMPHOCYTES # BLD AUTO: 1.3 K/UL (ref 1–4.8)
LYMPHOCYTES NFR BLD: 23.4 % (ref 18–48)
MCH RBC QN AUTO: 33.2 PG (ref 27–31)
MCHC RBC AUTO-ENTMCNC: 30.8 G/DL (ref 32–36)
MCV RBC AUTO: 108 FL (ref 82–98)
MONOCYTES # BLD AUTO: 0.4 K/UL (ref 0.3–1)
MONOCYTES NFR BLD: 7.8 % (ref 4–15)
NEUTROPHILS # BLD AUTO: 3.6 K/UL (ref 1.8–7.7)
NEUTROPHILS NFR BLD: 64.6 % (ref 38–73)
NONHDLC SERPL-MCNC: 200 MG/DL
NRBC BLD-RTO: 0 /100 WBC
PLATELET # BLD AUTO: 235 K/UL (ref 150–350)
PMV BLD AUTO: 10.3 FL (ref 9.2–12.9)
POTASSIUM SERPL-SCNC: 4.5 MMOL/L (ref 3.5–5.1)
PROT SERPL-MCNC: 7.6 G/DL (ref 6–8.4)
RBC # BLD AUTO: 3.25 M/UL (ref 4–5.4)
RHEUMATOID FACT SERPL-ACNC: 68 IU/ML (ref 0–15)
SODIUM SERPL-SCNC: 140 MMOL/L (ref 136–145)
TRIGL SERPL-MCNC: 101 MG/DL (ref 30–150)
WBC # BLD AUTO: 5.51 K/UL (ref 3.9–12.7)

## 2019-04-11 PROCEDURE — 86038 ANTINUCLEAR ANTIBODIES: CPT | Mod: HCNC,NTX

## 2019-04-11 PROCEDURE — 86140 C-REACTIVE PROTEIN: CPT | Mod: HCNC,NTX

## 2019-04-11 PROCEDURE — 80061 LIPID PANEL: CPT | Mod: HCNC,NTX

## 2019-04-11 PROCEDURE — 80053 COMPREHEN METABOLIC PANEL: CPT | Mod: HCNC,TXP

## 2019-04-11 PROCEDURE — 83036 HEMOGLOBIN GLYCOSYLATED A1C: CPT | Mod: HCNC,NTX

## 2019-04-11 PROCEDURE — 85652 RBC SED RATE AUTOMATED: CPT | Mod: HCNC,TXP

## 2019-04-11 PROCEDURE — 86200 CCP ANTIBODY: CPT | Mod: HCNC,TXP

## 2019-04-11 PROCEDURE — 85025 COMPLETE CBC W/AUTO DIFF WBC: CPT | Mod: HCNC,NTX

## 2019-04-11 PROCEDURE — 86431 RHEUMATOID FACTOR QUANT: CPT | Mod: HCNC,TXP

## 2019-04-11 PROCEDURE — 36415 COLL VENOUS BLD VENIPUNCTURE: CPT | Mod: HCNC,PO,TXP

## 2019-04-11 RX ORDER — TRAMADOL HYDROCHLORIDE 50 MG/1
TABLET ORAL
Qty: 30 TABLET | Refills: 0 | Status: ON HOLD | OUTPATIENT
Start: 2019-04-11 | End: 2019-10-08 | Stop reason: HOSPADM

## 2019-04-11 NOTE — TELEPHONE ENCOUNTER
Spoke with Reina, pt's lab today-showed RF high at 65. Given pt's hx of ESRD and PUD, NSAIDS are contraindicated so have erx'd in Tramadol 50mg prn only and will schedule Rheum appt.  Aura, would you plesae schedule pt an appt in Rheumatology on a Tuesday or Thursday and call pt's daughter, Reina to confirm. I'd prefer Dr Spann or Dr Boothe if available. Thanks

## 2019-04-12 ENCOUNTER — HOSPITAL ENCOUNTER (EMERGENCY)
Facility: HOSPITAL | Age: 59
Discharge: HOME | End: 2019-04-12
Attending: EMERGENCY MEDICINE
Payer: MEDICARE

## 2019-04-12 ENCOUNTER — TELEPHONE (OUTPATIENT)
Dept: VASCULAR SURGERY | Facility: CLINIC | Age: 59
End: 2019-04-12

## 2019-04-12 VITALS
OXYGEN SATURATION: 98 % | TEMPERATURE: 99 F | RESPIRATION RATE: 17 BRPM | HEIGHT: 63 IN | DIASTOLIC BLOOD PRESSURE: 65 MMHG | BODY MASS INDEX: 27.29 KG/M2 | HEART RATE: 63 BPM | SYSTOLIC BLOOD PRESSURE: 135 MMHG | WEIGHT: 154 LBS

## 2019-04-12 DIAGNOSIS — N19 RENAL FAILURE: ICD-10-CM

## 2019-04-12 DIAGNOSIS — R73.9 HYPERGLYCEMIA: ICD-10-CM

## 2019-04-12 DIAGNOSIS — Z78.9 PROBLEM WITH VASCULAR ACCESS: Primary | ICD-10-CM

## 2019-04-12 DIAGNOSIS — Z99.2 ESRD (END STAGE RENAL DISEASE) ON DIALYSIS: Primary | ICD-10-CM

## 2019-04-12 DIAGNOSIS — N18.6 ESRD (END STAGE RENAL DISEASE) ON DIALYSIS: Primary | ICD-10-CM

## 2019-04-12 LAB
ALBUMIN SERPL BCP-MCNC: 3.5 G/DL (ref 3.5–5.2)
ALP SERPL-CCNC: 79 U/L (ref 55–135)
ALT SERPL W/O P-5'-P-CCNC: 5 U/L (ref 10–44)
ANA SER QL IF: NORMAL
ANION GAP SERPL CALC-SCNC: 16 MMOL/L (ref 8–16)
AST SERPL-CCNC: 9 U/L (ref 10–40)
BASOPHILS # BLD AUTO: 0.03 K/UL (ref 0–0.2)
BASOPHILS NFR BLD: 0.6 % (ref 0–1.9)
BILIRUB SERPL-MCNC: 0.6 MG/DL (ref 0.1–1)
BUN SERPL-MCNC: 71 MG/DL (ref 6–30)
BUN SERPL-MCNC: 80 MG/DL (ref 6–20)
CALCIUM SERPL-MCNC: 9.1 MG/DL (ref 8.7–10.5)
CHLORIDE SERPL-SCNC: 95 MMOL/L (ref 95–110)
CHLORIDE SERPL-SCNC: 96 MMOL/L (ref 95–110)
CO2 SERPL-SCNC: 28 MMOL/L (ref 23–29)
CREAT SERPL-MCNC: 7.2 MG/DL (ref 0.5–1.4)
CREAT SERPL-MCNC: 7.8 MG/DL (ref 0.5–1.4)
DIFFERENTIAL METHOD: ABNORMAL
EOSINOPHIL # BLD AUTO: 0.2 K/UL (ref 0–0.5)
EOSINOPHIL NFR BLD: 3.2 % (ref 0–8)
ERYTHROCYTE [DISTWIDTH] IN BLOOD BY AUTOMATED COUNT: 14.5 % (ref 11.5–14.5)
EST. GFR  (AFRICAN AMERICAN): 5.9 ML/MIN/1.73 M^2
EST. GFR  (NON AFRICAN AMERICAN): 5.1 ML/MIN/1.73 M^2
GLUCOSE SERPL-MCNC: 409 MG/DL (ref 70–110)
GLUCOSE SERPL-MCNC: 425 MG/DL (ref 70–110)
HCT VFR BLD AUTO: 33 % (ref 37–48.5)
HCT VFR BLD CALC: 33 %PCV (ref 36–54)
HGB BLD-MCNC: 10.2 G/DL (ref 12–16)
IMM GRANULOCYTES # BLD AUTO: 0.01 K/UL (ref 0–0.04)
IMM GRANULOCYTES NFR BLD AUTO: 0.2 % (ref 0–0.5)
LYMPHOCYTES # BLD AUTO: 1.4 K/UL (ref 1–4.8)
LYMPHOCYTES NFR BLD: 29.9 % (ref 18–48)
MAGNESIUM SERPL-MCNC: 2.5 MG/DL (ref 1.6–2.6)
MCH RBC QN AUTO: 33.1 PG (ref 27–31)
MCHC RBC AUTO-ENTMCNC: 30.9 G/DL (ref 32–36)
MCV RBC AUTO: 107 FL (ref 82–98)
MONOCYTES # BLD AUTO: 0.4 K/UL (ref 0.3–1)
MONOCYTES NFR BLD: 8.9 % (ref 4–15)
NEUTROPHILS # BLD AUTO: 2.7 K/UL (ref 1.8–7.7)
NEUTROPHILS NFR BLD: 57.2 % (ref 38–73)
NRBC BLD-RTO: 0 /100 WBC
PHOSPHATE SERPL-MCNC: 5.9 MG/DL (ref 2.7–4.5)
PLATELET # BLD AUTO: 217 K/UL (ref 150–350)
PMV BLD AUTO: 9.9 FL (ref 9.2–12.9)
POC IONIZED CALCIUM: 0.94 MMOL/L (ref 1.06–1.42)
POC TCO2 (MEASURED): 28 MMOL/L (ref 23–29)
POCT GLUCOSE: 318 MG/DL (ref 70–110)
POCT GLUCOSE: 324 MG/DL (ref 70–110)
POTASSIUM BLD-SCNC: 4.2 MMOL/L (ref 3.5–5.1)
POTASSIUM SERPL-SCNC: 4.4 MMOL/L (ref 3.5–5.1)
PROT SERPL-MCNC: 7.4 G/DL (ref 6–8.4)
RBC # BLD AUTO: 3.08 M/UL (ref 4–5.4)
SAMPLE: ABNORMAL
SODIUM BLD-SCNC: 136 MMOL/L (ref 136–145)
SODIUM SERPL-SCNC: 139 MMOL/L (ref 136–145)
WBC # BLD AUTO: 4.72 K/UL (ref 3.9–12.7)

## 2019-04-12 PROCEDURE — 83735 ASSAY OF MAGNESIUM: CPT | Mod: HCNC,NTX

## 2019-04-12 PROCEDURE — 93010 ELECTROCARDIOGRAM REPORT: CPT | Mod: HCNC,NTX,, | Performed by: INTERNAL MEDICINE

## 2019-04-12 PROCEDURE — 99283 PR EMERGENCY DEPT VISIT,LEVEL III: ICD-10-PCS | Mod: HCNC,NTX,, | Performed by: PHYSICIAN ASSISTANT

## 2019-04-12 PROCEDURE — 99284 EMERGENCY DEPT VISIT MOD MDM: CPT | Mod: 25,HCNC,NTX

## 2019-04-12 PROCEDURE — 63600175 PHARM REV CODE 636 W HCPCS: Mod: HCNC,NTX | Performed by: EMERGENCY MEDICINE

## 2019-04-12 PROCEDURE — 80053 COMPREHEN METABOLIC PANEL: CPT | Mod: HCNC,NTX

## 2019-04-12 PROCEDURE — 96372 THER/PROPH/DIAG INJ SC/IM: CPT | Mod: HCNC,NTX

## 2019-04-12 PROCEDURE — 93010 EKG 12-LEAD: ICD-10-PCS | Mod: HCNC,NTX,, | Performed by: INTERNAL MEDICINE

## 2019-04-12 PROCEDURE — 93005 ELECTROCARDIOGRAM TRACING: CPT | Mod: HCNC,NTX

## 2019-04-12 PROCEDURE — 85025 COMPLETE CBC W/AUTO DIFF WBC: CPT | Mod: HCNC,NTX

## 2019-04-12 PROCEDURE — 82962 GLUCOSE BLOOD TEST: CPT | Mod: HCNC,NTX,91

## 2019-04-12 PROCEDURE — 99283 EMERGENCY DEPT VISIT LOW MDM: CPT | Mod: HCNC,NTX,, | Performed by: PHYSICIAN ASSISTANT

## 2019-04-12 PROCEDURE — 84100 ASSAY OF PHOSPHORUS: CPT | Mod: HCNC,NTX

## 2019-04-12 RX ORDER — IBUPROFEN 200 MG
24 TABLET ORAL
Status: DISCONTINUED | OUTPATIENT
Start: 2019-04-12 | End: 2019-04-13 | Stop reason: HOSPADM

## 2019-04-12 RX ORDER — INSULIN ASPART 100 [IU]/ML
10 INJECTION, SOLUTION INTRAVENOUS; SUBCUTANEOUS
Status: COMPLETED | OUTPATIENT
Start: 2019-04-12 | End: 2019-04-12

## 2019-04-12 RX ORDER — GLUCAGON 1 MG
1 KIT INJECTION
Status: DISCONTINUED | OUTPATIENT
Start: 2019-04-12 | End: 2019-04-13 | Stop reason: HOSPADM

## 2019-04-12 RX ORDER — IBUPROFEN 200 MG
16 TABLET ORAL
Status: DISCONTINUED | OUTPATIENT
Start: 2019-04-12 | End: 2019-04-13 | Stop reason: HOSPADM

## 2019-04-12 RX ADMIN — INSULIN ASPART 10 UNITS: 100 INJECTION, SOLUTION INTRAVENOUS; SUBCUTANEOUS at 10:04

## 2019-04-12 NOTE — TELEPHONE ENCOUNTER
----- Message from [employer] sent at 4/12/2019  4:10 PM CDT -----  Contact: Mary Steele/Lupe  Reason for call:Calling to schedule pt a appt for clotted access.        Communication Preference:[phone] or [phone]    Additional Information:

## 2019-04-12 NOTE — TELEPHONE ENCOUNTER
Aura, would you plesae schedule pt an appt in Rheumatology on a Tuesday or Thursday and call pt's daughter, Reina to confirm. I'd prefer Dr Spann or Dr Boothe if available. Thanks         Documentation      Patient scheduled, daughter was notified....mailed out.

## 2019-04-12 NOTE — TELEPHONE ENCOUNTER
"Per Dr NANETTE Romano notified Ms Perez " to go to the Emergency [facility] to be evaluated." Ms Perez states " I will be there as soon as possible."  "

## 2019-04-13 NOTE — DISCHARGE INSTRUCTIONS
Please follow-up with Dr. Romano (vascular surgery)  on Monday morning.  Your fistula does not appear to be clotted.      Please take your insulin as prescribed.  Return to the ER with worsening symptoms

## 2019-04-13 NOTE — ED PROVIDER NOTES
Encounter Date: 4/12/2019       History     Chief Complaint   Patient presents with    Vascular Access Problem     Pt referred from dialysis center today because her fistula could not be accessed. Pt recieves dialysis M/W/F. Last full treatment was wednesday. Pt denies any other complaints.     Patient is a 58-year-old female ESRD on dialysis MWF, CVA with residual weakness to left extremities, hypertension, type 2 diabetes, rheumatoid arthritis is presenting to the ER for evaluation of vascular access problem.  Patient went to dialysis today at around 12:00 p.m. and was told that her fistula site was clotted.  Was unable to undergo dialysis today.  Patient's last dialysis was on Wednesday, 3.5 hr, was able to complete full dialysis.  Patient denies any chest pain palpitations or shortness of breath.  She states that she feels well otherwise.  Patient states that she has been undergoing dialysis for less than a year now.    The history is provided by the patient.     Review of patient's allergies indicates:   Allergen Reactions    Lisinopril Other (See Comments)     Angioedema      Vicodin [hydrocodone-acetaminophen] Rash     No problem with acetaminophen      Past Medical History:   Diagnosis Date    Anemia of chronic disease 6/10/2017    Anxiety     Arthritis of right acromioclavicular joint 7/2/2014    Asthma     Bipolar disorder     Bronchitis, acute     Cataract     Cholelithiasis     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    Cortical cataract of both eyes 7/26/2016    Decubitus ulcer of buttock, stage 2     Degeneration of lumbar or lumbosacral intervertebral disc 3/5/2013    S/p MRI L-spine 5/2009     Depression     ESRD on hemodialysis     Started August 2018    General anesthetics causing adverse effect in therapeutic use     Hemorrhagic cerebrovascular accident (CVA)     8/2016 s/p Hemicraniotomy at [facility] with Left hemiparesis    Hemorrhagic  cerebrovascular accident (CVA) 1/3/2018    History of stroke 6/28/2017    s/p R-MCA stroke with R-putaminal hemorrhagic transformation in 8/2016 and 11/2016 ([place] [facility] [place] with residual L hemiparesis, on AED s/p CVA      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    Hyperlipidemia     Hypertensive retinopathy of both eyes 7/26/2016    Impingement syndrome of right shoulder 7/2/2014    Left ventricular diastolic dysfunction with preserved systolic function 12/15/2015    Mixed anxiety and depressive disorder 3/5/2013    Obesity     THERESA (obstructive sleep apnea) 3/5/2013    No Home CPAP 2ndary to cost     Partial symptomatic epilepsy with complex partial seizures, not intractable, without status epilepticus     PEG (percutaneous endoscopic gastrostomy) status 6/28/2017    Renovascular hypertension 3/5/2013    Will resume home medications: amlodipine, labetalol, and hydralazine Will hold Lisinopril in setting of DARLING.   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 without macular edema, without long-term current use of insulin 8/2/2017    Type 2 diabetes mellitus with diabetic polyneuropathy, without long-term current use of insulin 10/22/2015    Type 2 diabetes mellitus with stage 3 chronic kidney disease, without long-term current use of insulin 10/22/2015    Vertebral artery stenosis 3/5/2013    S/p Stenting per Dr Burnett      Past Surgical History:   Procedure Laterality Date    ARTHROSCOPY-SHOULDER WITH SUBACROMIAL DECOMPRESSION Right 7/9/2014    [facility]    AV GRAFT CREATION Right 10/18/2018    Performed by [facility]ir Valencia [facility] at [facility] OR  "2ND FLR    Biceps Tenotomy Right 7/9/2014    [facility] OR    BREAST SURGERY      breast reduction    COLONOSCOPY N/A 8/11/2016    Performed by Jerry Vilchis MD at [facility] ENDO (4TH FLR)    DEBRIDEMENT-SHOULDER-ARTHROSCOPIC Labral Cuff Right 7/9/2014    [facility] at [facility] OR    ESOPHAGOGASTRODUODENOSCOPY (EGD) N/A 12/6/2017    [facility] by Dallas Lock Jr[facility] ENDO    [id number] END Right 7/9/2014    Per[facility] at [facility] OR    Fistulogram Right 2/11/2019    Performed by [facility]ir Valencia [facility] at [facility] CATH LAB    HYSTERECTOMY      ROTATOR CUFF REPAIR Right July 9, 2014    right side    Skull surgery      Aneurysm    stent placed      in vertebral artery    TOTAL REDUCTION MAMMOPLASTY      TUBAL LIGATION       Family History   Problem Relation Age of Onset    Cancer Mother 55        Breast cancer    Diabetes Mother     Hypertension Mother     Heart disease Mother     Heart attack Mother     Breast cancer Mother     Stroke Sister     Hypertension Sister     Sleep apnea Sister     No Known Problems Daughter     Diabetes Son         controlled    Bell's palsy Sister     Lupus Sister     Blindness Maternal Grandmother     Diabetes Unknown         "My entire family family has diabetes"    Stroke Maternal Aunt     Amblyopia Neg Hx     Cataracts Neg Hx     Glaucoma Neg Hx     Macular degeneration Neg Hx     Retinal detachment Neg Hx     Strabismus Neg Hx      Social History     Tobacco Use    Smoking status: Never Smoker    Smokeless tobacco: Never Used   Substance Use Topics    Alcohol use: No     Comment: occasional wine cooler     Drug use: No     Review of Systems   Constitutional: Negative for chills and fever.   HENT: Negative for congestion.    Respiratory: Negative for cough and shortness of breath.    Cardiovascular: Negative for chest pain, palpitations and leg swelling. "   Gastrointestinal: Negative for abdominal pain, nausea and vomiting.   Genitourinary: Negative for dysuria.   Musculoskeletal: Negative for myalgias.   Skin: Negative for rash.   Allergic/Immunologic: Negative for immunocompromised state.   Neurological: Positive for weakness (Chronic).   Hematological: Does not bruise/bleed easily.   Psychiatric/Behavioral: Negative for confusion.       Physical Exam     Initial Vitals [04/12/19 1903]   BP Pulse Resp Temp SpO2   126/62 71 18 98.6 °F (37 °C) 95 %      MAP       --         Physical Exam    Vitals reviewed.  Constitutional: She appears well-developed and well-nourished. She is not diaphoretic. No distress.   HENT:   Head: Normocephalic and atraumatic.   Eyes: Conjunctivae and EOM are normal.   Neck: Neck supple.   Cardiovascular: Normal rate, regular rhythm and normal heart sounds.   Pulmonary/Chest: Breath sounds normal.   Musculoskeletal:        Arms:  Neurological: She is alert and oriented to person, place, and time.   Skin: Skin is warm and dry.         ED Course   Procedures  Labs Reviewed   CBC W/ AUTO DIFFERENTIAL - Abnormal; Notable for the following components:       Result Value    RBC 3.08 (*)     Hemoglobin 10.2 (*)     Hematocrit 33.0 (*)      (*)     MCH 33.1 (*)     MCHC 30.9 (*)     All other components within normal limits   COMPREHENSIVE METABOLIC PANEL - Abnormal; Notable for the following components:    Glucose 425 (*)     BUN, Bld 80 (*)     Creatinine 7.8 (*)     AST 9 (*)     ALT 5 (*)     eGFR if  5.9 (*)     eGFR if non  5.1 (*)     All other components within normal limits   PHOSPHORUS - Abnormal; Notable for the following components:    Phosphorus 5.9 (*)     All other components within normal limits   ISTAT PROCEDURE - Abnormal; Notable for the following components:    POC Glucose 409 (*)     POC BUN 71 (*)     POC Creatinine 7.2 (*)     POC Ionized Calcium 0.94 (*)     POC Hematocrit 33 (*)     All  other components within normal limits   POCT GLUCOSE - Abnormal; Notable for the following components:    POCT Glucose 324 (*)     All other components within normal limits   MAGNESIUM   ISTAT CHEM8   POCT GLUCOSE MONITORING CONTINUOUS          Imaging Results          US Extremity Non Vascular Limited Right (Final result)  Result time 04/12/19 21:23:44    Final result by Misael Garcia MD (04/12/19 21:23:44)                 Impression:      Patent AV graft fistula in the right upper extremity.    Electronically signed by resident: Colin Augustine  Date:    04/12/2019  Time:    21:09    Electronically signed by: Misael Garcia MD  Date:    04/12/2019  Time:    21:23             Narrative:    EXAMINATION:  US EXTREMITY NON VASCULAR LIMITED RIGHT    CLINICAL HISTORY:  vas access clotted.    TECHNIQUE:  Limited Grayscale and color Doppler ultrasound right upper extremity was performed for evaluation of AV fistula.    COMPARISON:  Ultrasound 12/08/2018    FINDINGS:  A patent AV fistula graft is present in the right upper extremity.  Graft appears to be a brachial-basilic connection.  Surrounding visualized soft tissues are unremarkable.                               X-Ray Chest AP Portable (Final result)  Result time 04/12/19 20:33:01    Final result by Maciej Chavez MD (04/12/19 20:33:01)                 Impression:      No radiographic acute intrathoracic process seen.      Electronically signed by: Maciej Chavez MD  Date:    04/12/2019  Time:    20:33             Narrative:    EXAMINATION:  XR CHEST AP PORTABLE    CLINICAL HISTORY:  Unspecified kidney failure    TECHNIQUE:  Single frontal view of the chest was performed.    COMPARISON:  Chest radiograph 12/08/2018    FINDINGS:  Right basilar platelike scarring versus atelectasis.  Chronic mild nonspecific elevation of the right hemidiaphragm.  Cardiomediastinal silhouette is midline and stable without evidence of failure.  The lungs are otherwise well expanded  without large consolidation, pleural effusion or pneumothorax.  No acute osseous process seen.  PA and lateral views can be obtained.                                       APC / Resident Notes:   Patient seen in the ER promptly upon arrival.  She is afebrile, no acute distress. Physical examination reveals AV fistula to the right upper extremity.  No thrill palpated but bruit is present. There is no erythema or tenderness on palpation to the site.  Distal pulses intact. EKG shows normal sinus rhythm at a rate of 60 beats per minute, prolonged QT    IV access established, labs ordered.  Laboratory studies show normal white count of 4.7.  Hemoglobin is stable. Chemistries reveal creatinine 7.8, BUN 80.  Normal CO2 and anion gap.   concerning for DKA at this time.  Patient given subcu insulin in ED.  Magnesium 2.5.  Phosphorus 5.9.    Ultrasound of upper extremity reveals patent AV graft fistula.    Discussed case with vascular surgery resident.  Recommended to have patient follow up with Dr. Romano on Monday morning.  Felt that given patent AV fistula findings on ultrasound this is likely secondary to human error.  Patient given strict return precautions to the ED.  She felt comfortable with close follow-up on Monday.  Patient advised to stay compliant with her insulin at home.  She is stable for discharge and close follow-up. The care of this patient was overseen by attending physician who agrees with treatment, plan, and disposition.                   Clinical Impression:       ICD-10-CM ICD-9-CM   1. Problem with vascular access Z78.9 V49.9   2. Renal failure N19 586   3. Hyperglycemia R73.9 790.29         Disposition:   Disposition: Discharged  Condition: Stable                        Tova Parisi PA-C  04/12/19 2883

## 2019-04-13 NOTE — ED NOTES
LOC: Pt is awake, alert, oriented x4. Affect is appropriate. Speech clear and appropriate.      Appearance: Pt resting comfortably in no acute distress. Pt clean and well groomed. In personal wheelchair for past left sided weakness.     Skin: Skin warm and dry. Color consistent with ethnicity. Skin turgor normal. Mucus membranes moist. Skin intact. No breakdown or bruising noted.     Musculoskeletal: Pt moving upper and lower extremities without difficulty. Previous left sided weakness.     Respiratory: Airway open and patent. Respirations spontaneous, even, easy, and unlabored. Pt has normal effort and rate. No accessory muscle use noted. Denies cough. Denies shortness of breath.     Cardiovascular: No peripheral edema noted. No complaints of chest pain. S1S2 present. Rate regular. Radial pulses 2+.     Abdominal: Abdomen soft and nontender. No distention noted. Denies n/v. Bowels sounds active x 4 quadrants.     Neurological: Eyes open spontaneously. Behavior appropriate to situation. Follows commands appropriately. Facial expression symmetrical. Purposeful motor response. Sensation intact.     Dialysis fistula to right upper arm. Positive bruit noted.

## 2019-04-18 DIAGNOSIS — Z76.82 ORGAN TRANSPLANT CANDIDATE: Primary | ICD-10-CM

## 2019-04-25 ENCOUNTER — TELEPHONE (OUTPATIENT)
Dept: INTERNAL MEDICINE | Facility: CLINIC | Age: 59
End: 2019-04-25

## 2019-05-08 ENCOUNTER — TELEPHONE (OUTPATIENT)
Dept: INTERNAL MEDICINE | Facility: CLINIC | Age: 59
End: 2019-05-08

## 2019-05-08 ENCOUNTER — OFFICE VISIT (OUTPATIENT)
Dept: INTERNAL MEDICINE | Facility: CLINIC | Age: 59
End: 2019-05-08
Payer: MEDICARE

## 2019-05-08 VITALS
SYSTOLIC BLOOD PRESSURE: 118 MMHG | HEIGHT: 63 IN | BODY MASS INDEX: 27.28 KG/M2 | HEART RATE: 56 BPM | DIASTOLIC BLOOD PRESSURE: 64 MMHG

## 2019-05-08 DIAGNOSIS — Z99.2 TYPE 2 DIABETES MELLITUS WITH CHRONIC KIDNEY DISEASE ON CHRONIC DIALYSIS, WITH LONG-TERM CURRENT USE OF INSULIN: Primary | ICD-10-CM

## 2019-05-08 DIAGNOSIS — F32.A DEPRESSION, UNSPECIFIED DEPRESSION TYPE: ICD-10-CM

## 2019-05-08 DIAGNOSIS — F41.8 MIXED ANXIETY AND DEPRESSIVE DISORDER: ICD-10-CM

## 2019-05-08 DIAGNOSIS — E11.22 TYPE 2 DIABETES MELLITUS WITH CHRONIC KIDNEY DISEASE ON CHRONIC DIALYSIS, WITH LONG-TERM CURRENT USE OF INSULIN: Primary | ICD-10-CM

## 2019-05-08 DIAGNOSIS — M17.9 OSTEOARTHRITIS OF KNEE, UNSPECIFIED LATERALITY, UNSPECIFIED OSTEOARTHRITIS TYPE: ICD-10-CM

## 2019-05-08 DIAGNOSIS — H61.23 CERUMEN DEBRIS ON TYMPANIC MEMBRANE OF BOTH EARS: ICD-10-CM

## 2019-05-08 DIAGNOSIS — N18.6 TYPE 2 DIABETES MELLITUS WITH CHRONIC KIDNEY DISEASE ON CHRONIC DIALYSIS, WITH LONG-TERM CURRENT USE OF INSULIN: Primary | ICD-10-CM

## 2019-05-08 DIAGNOSIS — I10 ESSENTIAL HYPERTENSION: ICD-10-CM

## 2019-05-08 DIAGNOSIS — E78.00 PURE HYPERCHOLESTEROLEMIA: ICD-10-CM

## 2019-05-08 DIAGNOSIS — Z79.4 TYPE 2 DIABETES MELLITUS WITH CHRONIC KIDNEY DISEASE ON CHRONIC DIALYSIS, WITH LONG-TERM CURRENT USE OF INSULIN: Primary | ICD-10-CM

## 2019-05-08 PROCEDURE — 96372 PR INJECTION,THERAP/PROPH/DIAG2ST, IM OR SUBCUT: ICD-10-PCS | Mod: HCNC,S$GLB,, | Performed by: INTERNAL MEDICINE

## 2019-05-08 PROCEDURE — 99999 PR PBB SHADOW E&M-EST. PATIENT-LVL V: CPT | Mod: PBBFAC,HCNC,, | Performed by: INTERNAL MEDICINE

## 2019-05-08 PROCEDURE — 3008F BODY MASS INDEX DOCD: CPT | Mod: HCNC,CPTII,S$GLB, | Performed by: INTERNAL MEDICINE

## 2019-05-08 PROCEDURE — 3045F PR MOST RECENT HEMOGLOBIN A1C LEVEL 7.0-9.0%: ICD-10-PCS | Mod: HCNC,CPTII,S$GLB, | Performed by: INTERNAL MEDICINE

## 2019-05-08 PROCEDURE — 3045F PR MOST RECENT HEMOGLOBIN A1C LEVEL 7.0-9.0%: CPT | Mod: HCNC,CPTII,S$GLB, | Performed by: INTERNAL MEDICINE

## 2019-05-08 PROCEDURE — 99999 PR PBB SHADOW E&M-EST. PATIENT-LVL V: ICD-10-PCS | Mod: PBBFAC,HCNC,, | Performed by: INTERNAL MEDICINE

## 2019-05-08 PROCEDURE — 99214 OFFICE O/P EST MOD 30 MIN: CPT | Mod: 25,HCNC,S$GLB, | Performed by: INTERNAL MEDICINE

## 2019-05-08 PROCEDURE — 96372 THER/PROPH/DIAG INJ SC/IM: CPT | Mod: HCNC,S$GLB,, | Performed by: INTERNAL MEDICINE

## 2019-05-08 PROCEDURE — 3008F PR BODY MASS INDEX (BMI) DOCUMENTED: ICD-10-PCS | Mod: HCNC,CPTII,S$GLB, | Performed by: INTERNAL MEDICINE

## 2019-05-08 PROCEDURE — 99214 PR OFFICE/OUTPT VISIT, EST, LEVL IV, 30-39 MIN: ICD-10-PCS | Mod: 25,HCNC,S$GLB, | Performed by: INTERNAL MEDICINE

## 2019-05-08 RX ORDER — ALPRAZOLAM 0.5 MG/1
TABLET ORAL
Qty: 30 TABLET | Refills: 0 | Status: SHIPPED | OUTPATIENT
Start: 2019-05-08 | End: 2019-12-04

## 2019-05-08 RX ORDER — KETOROLAC TROMETHAMINE 30 MG/ML
60 INJECTION, SOLUTION INTRAMUSCULAR; INTRAVENOUS
Status: COMPLETED | OUTPATIENT
Start: 2019-05-08 | End: 2019-05-08

## 2019-05-08 RX ORDER — ATORVASTATIN CALCIUM 40 MG/1
TABLET, FILM COATED ORAL
Qty: 90 TABLET | Refills: 2 | Status: SHIPPED | OUTPATIENT
Start: 2019-05-08 | End: 2019-05-08 | Stop reason: SDUPTHER

## 2019-05-08 RX ORDER — INSULIN GLARGINE 100 [IU]/ML
INJECTION, SOLUTION SUBCUTANEOUS
Qty: 3 ML | Refills: 6 | Status: ON HOLD | OUTPATIENT
Start: 2019-05-08 | End: 2019-06-21

## 2019-05-08 RX ORDER — BUPROPION HYDROCHLORIDE 300 MG/1
300 TABLET ORAL DAILY
Qty: 30 TABLET | Refills: 6 | Status: ON HOLD | OUTPATIENT
Start: 2019-05-08 | End: 2019-10-08 | Stop reason: HOSPADM

## 2019-05-08 RX ORDER — ATORVASTATIN CALCIUM 40 MG/1
TABLET, FILM COATED ORAL
Qty: 90 TABLET | Refills: 2 | Status: SHIPPED | OUTPATIENT
Start: 2019-05-08 | End: 2020-09-18 | Stop reason: SDUPTHER

## 2019-05-08 RX ADMIN — KETOROLAC TROMETHAMINE 60 MG: 30 INJECTION, SOLUTION INTRAMUSCULAR; INTRAVENOUS at 02:05

## 2019-05-08 NOTE — TELEPHONE ENCOUNTER
Shirley,  I was wondering if I could get some assistance/advice.  This pt has IDDM and a left hemiparesis s/p hemorrhagic stroke.  I believe she would benefit from a Freestyle Rea glucometer, but I was wondering how to get authorized.  I have requested 1 in the past and it was refused. I would  request the kit and how many sensors?  I'm hoping the stroke diagnosis will help get it authorized. Sorry to be a nuisance/Thanks for any help  Beverly Muniz

## 2019-05-08 NOTE — PATIENT INSTRUCTIONS
Medications:  #1-Increase Lantus to 12 units daily in PM  #2-Increase Wellbutrin XL to 300mg daily

## 2019-05-08 NOTE — PROGRESS NOTES
"Subjective:       Patient ID: Lucy Perez is a 58 y.o. female.    Chief Complaint:   Follow-up; Diabetes; and Hypertension    HPI:  Mrs. Ayala presents for follow-up diabetes and hypertension.  She is doing better with her ADA diet but does request   change to the Lantus pen from the injection formulation as this would be easier with her left-sided stroke paralysis.  She also  Has not been doing glucose monitoring due to disability from the stroke and hemiparesis.  She complains of pain in the left knee and ankle.  She is worried this could be from her rheumatoid arthritis.  The tramadol helps but causes sedation.  She has tried Tylenol but with minimal relief.  She does complain of anxiety especially with dialysis but also with stressors at home.    Past Medical, Surgical, Social History: Please see as stated in Epic chart which has been reviewed.    Current Outpatient Medications   Medication Sig Dispense Refill    acetaminophen (TYLENOL) 325 MG tablet Take 2 tablets (650 mg total) by mouth every 6 (six) hours as needed for Pain.  0    albuterol (PROVENTIL) 2.5 mg /3 mL (0.083 %) nebulizer solution Take 3 mLs (2.5 mg total) by nebulization every 6 (six) hours as needed for Wheezing. Rescue 25 each 3    aspirin (ECOTRIN) 81 MG EC tablet Take 81 mg by mouth every morning.       atorvastatin (LIPITOR) 40 MG tablet TAKE 1 TABLET ONE TIME DAILY FOR CHOLESTEROL 90 tablet 2    BD INSULIN PEN NEEDLE UF SHORT 31 gauge x 5/16" Ndle USE TO INJECT NOVOLOG FLEXPEN BEFORE MEALS 150 each 11    blood sugar diagnostic (ACCU-CHEK SMARTVIEW TEST STRIP) Strp 1 strip by Misc.(Non-Drug; Combo Route) route 2 (two) times daily. 300 each 3    buPROPion (WELLBUTRIN XL) 300 MG 24 hr tablet Take 1 tablet (300 mg total) by mouth once daily. 30 tablet 6    carvedilol (COREG) 25 MG tablet Take 1 tablet (25 mg total) by mouth 2 (two) times daily. 180 tablet 2    dantrolene (DANTRIUM) 50 MG Cap Take 1 capsule (50 mg total) " by mouth 3 (three) times daily. 270 capsule 3    DULCOLAX, BISACODYL, ORAL Take by mouth as needed (constipation).      ergocalciferol (ERGOCALCIFEROL) 50,000 unit Cap 1 capsule (50,000 Units total) by Per G Tube route every 7 days. (Patient taking differently: Take 50,000 Units by mouth every 7 days. Takes on Thurs) 12 capsule 2    famotidine (PEPCID) 20 MG tablet Take 1 tablet (20 mg total) by mouth once daily. 90 tablet 2    ferrous sulfate 220 mg (44 mg iron)/5 mL solution 10ml daily for Iron/Give via PEG (Patient taking differently: Take 220 mg by mouth every morning. ) 300 mL 6    folic acid (FOLVITE) 1 MG tablet Take 1 tablet (1 mg total) by mouth once daily. 90 tablet 2    furosemide (LASIX) 40 MG tablet Take 1 tablet (40 mg total) by mouth once daily. (Patient taking differently: Take 40 mg by mouth every morning. ) 90 tablet 2    insulin glargine (LANTUS U-100 INSULIN) 100 unit/mL injection 10 units daily in the PM 10 mL 6    levETIRAcetam (KEPPRA) 500 MG Tab Take 1 tablet (500 mg total) by mouth every 8 (eight) hours. 270 tablet 2    lidocaine-prilocaine (EMLA) cream Apply topically as needed. 30 g 1    polyethylene glycol 3350 (MIRALAX ORAL) Take by mouth every evening.       pregabalin (LYRICA) 25 MG capsule 1 cap in AM and 2 caps at Bedtime for muscle spasm 90 capsule 4    sevelamer carbonate (RENVELA) 800 mg Tab Take 800 mg by mouth 3 (three) times daily with meals.      sodium bicarbonate 650 MG tablet Take 2 tablets (1,300 mg total) by mouth 2 (two) times daily. 360 tablet 2    traMADol (ULTRAM) 50 mg tablet 1 tab Q6-8 hours as needed for joint pain 30 tablet 0    ALPRAZolam (XANAX) 0.5 MG tablet 1 tab Every 8 hours as needed for anxiety 30 tablet 0    flash glucose scanning reader (FREESTYLE JOSÉ LUIS 14 DAY READER) Misc Use as directed. Scan 4 times a day. 1 each 0    flash glucose sensor (FREESTYLE JOSÉ LUIS 14 DAY SENSOR) Kit Change every 14 days. 2 kit 11    insulin (LANTUS SOLOSTAR  U-100 INSULIN) glargine 100 units/mL (3mL) SubQ pen 12 units Subcutaneously at Bedtime 3 mL 6     Current Facility-Administered Medications   Medication Dose Route Frequency Provider Last Rate Last Dose    onabotulinumtoxina injection 300 Units  300 Units Intramuscular 1 time in Clinic/HOD Monty Pardo MD           Review of Systems   HENT: Positive for congestion.         +Bilateral ear congestion/no pain   Respiratory: Negative.    Cardiovascular: Negative.    Gastrointestinal: Negative.    Musculoskeletal: Positive for arthralgias and back pain.        +Pain Left knee/ankle  +Sacral pain s/p CVA  +Left Shoulder pain   Psychiatric/Behavioral: The patient is nervous/anxious.        Objective:      Lab Results   Component Value Date    WBC 4.72 04/12/2019    HGB 10.2 (L) 04/12/2019    HCT 33 (L) 04/12/2019     04/12/2019    CHOL 262 (H) 04/11/2019    TRIG 101 04/11/2019    HDL 62 04/11/2019    ALT 5 (L) 04/12/2019    AST 9 (L) 04/12/2019     04/12/2019    K 4.4 04/12/2019    CL 95 04/12/2019    CREATININE 7.8 (H) 04/12/2019    BUN 80 (H) 04/12/2019    CO2 28 04/12/2019    TSH 1.302 01/15/2019    INR 0.9 12/08/2018    GLUF 190 (H) 10/04/2004    HGBA1C 7.5 (H) 04/11/2019     Physical Exam   Constitutional: She is oriented to person, place, and time. She appears well-developed and well-nourished.   HENT:   Bilateral ear canals show cerumen build up   Neck: Normal range of motion. Neck supple.   Cardiovascular: Normal rate, regular rhythm and normal heart sounds.   Pulmonary/Chest: Effort normal and breath sounds normal.   Abdominal: Soft. Bowel sounds are normal.   Musculoskeletal: She exhibits tenderness and deformity.   -Left knee shows swelling with chronic OA changes/no warmth but +pain with ROM  -Bilateral hand/wrist ankle joints show no acute warmth or synovial thickening  -Left shoulder-shows pain with minimal ROM/no warmth/left arm is hemiparetic s/p CVA   Neurological: She is alert and  oriented to person, place, and time.   -Left sided hemiparesis present in Upper/Lower extremities  -Speech is much better/near baseline   Skin: Skin is warm and dry.   Psychiatric: She has a normal mood and affect.   Vitals reviewed.      Assessment:       1. Type 2 diabetes mellitus with chronic kidney disease on chronic dialysis, with long-term current use of insulin    2. Pure hypercholesterolemia    3. Depression, unspecified depression type    4. Mixed anxiety and depressive disorder    5. Osteoarthritis of knee, unspecified laterality, unspecified osteoarthritis type    6. Cerumen debris on tympanic membrane of both ears    7. Essential hypertension        Plan:     Health Maintenance   Topic Date Due    TETANUS VACCINE  08/24/1976    Pneumococcal Vaccine (Medium Risk) (1 of 1 - PPSV23) 08/24/1977    Foot Exam  03/29/2018    Eye Exam  03/29/2018    Influenza Vaccine  08/01/2019    Mammogram  10/02/2019    Hemoglobin A1c  10/11/2019    Lipid Panel  04/11/2020    Colonoscopy  02/13/2022    Hepatitis C Screening  Completed    Fecal Occult Blood Test (FOBT)/FitKit  Discontinued        Lucy was seen today for follow-up, diabetes and hypertension.    Diagnoses and all orders for this visit:    Type 2 diabetes mellitus with chronic kidney disease on chronic dialysis, with long-term current use of insulin/improved with HgbA1C down to 7.5 from 8.5  -     Change the insulin (LANTUS SOLOSTAR U-100 INSULIN) glargine 100 units/mL (3mL) SubQ pen and will increase to  12 units Subcutaneously at Bedtime  -     Comprehensive metabolic panel; Future  -     Hemoglobin A1c; Future x 3-4 months        -     FreeStyle Rea ordered for glucose monitoring/should be authorized due to CVA/hemiparesis    Pure hypercholesterolemia/uncontrolled 2ndary to ? Medication noncompliance  -     Encouraged daily Atorvastatin (LIPITOR) 40 MG tablet; TAKE 1 TABLET ONE TIME DAILY FOR CHOLESTEROL and have erx'd in refill  -     Lipid  panel; Future x 3-4 months    Mixed Anxiety/Depression D/O   -     Increase BuPROPion (WELLBUTRIN XL) 300 MG 24 hr tablet; Take 1 tablet (300 mg total) by mouth once daily.  -     ALPRAZolam (XANAX) 0.5 MG tablet; 1 tab Every 8 hours as needed for anxiety    Osteoarthritis of knee, unspecified laterality, unspecified osteoarthritis type/probably OA  -     Ambulatory consult to Orthopedics  -     Ketorolac injection 60 mg    Cerumen debris on tympanic membrane of both ears  -     Ambulatory consult to ENT/Dr ESTELLA Grace    Essential hypertension/controlled        -     Continue Coreg 25mg BID and Lasix 40mg QD as per Nephrologist  -     Comprehensive metabolic panel; Future  -     CBC auto differential; Future    Rheumatoid Arthritis in pt with PUD and recent arthralgias         -     Rheumatology appt/Dr Noguera appt pending

## 2019-06-08 NOTE — PROGRESS NOTES
Subjective:     Patient ID: Lucy Perez is a 58 y.o. female sent for consultation on arthralgia by Dr. Max, her PCP  Chief Complaint: No chief complaint on file.       HPI   58 yr old lady with multiple morbidities that include DM II, ESRD (on HD x 1 yr), CVA w L spastic hemiparesis & intracranial bleed, anxiety/depression/bipolar & a central pain syndrome.    Here because of a + RF associated with joint pain--mainly L knee, L ankle and low back.     Apparently was dx with seronegative RA here at [facility] in 1990's. At the time she had pain in knees, ankles, wrists and couldn't walk. Never had hand/finger pain/swelling. Was on HCQ for a while and it helped. Stopped it years later as she felt better & was afraid of retinal side effects. Apparently had another flare after stopping it & couldn't walk. It was in ankles and knees.  Was on prednisone. That helped May have been on leflunomide--does not remember. Never on MTX, SSZ.    Also dx with sarcoid but can only remember name, no details. It appears in her chart. Last CXR (portable) wo LDA but w R basilar scarring vs atelactsis      AM stiffness x 1 hr (low back, L knee & ankles).    Occasionally has L knee swelling. Denies Raynaud's,  tight skin, alopecia, oral ulcers, pleurisy, pericarditis, photosensitivity, skin rashes, miscarriages (0/2--overdue). Has had 2 CVAs and brain aneurysm. Also had stent age 38. Has upper dysphagia.   Has dry mouth.   Has +RF 68      Current Outpatient Medications   Medication Sig Dispense Refill    acetaminophen (TYLENOL) 325 MG tablet Take 2 tablets (650 mg total) by mouth every 6 (six) hours as needed for Pain.  0    albuterol (PROVENTIL) 2.5 mg /3 mL (0.083 %) nebulizer solution Take 3 mLs (2.5 mg total) by nebulization every 6 (six) hours as needed for Wheezing. Rescue 25 each 3    ALPRAZolam (XANAX) 0.5 MG tablet 1 tab Every 8 hours as needed for anxiety 30 tablet 0    aspirin (ECOTRIN) 81 MG EC tablet Take 81  "mg by mouth every morning.       atorvastatin (LIPITOR) 40 MG tablet TAKE 1 TABLET ONE TIME DAILY FOR CHOLESTEROL 90 tablet 2    BD INSULIN PEN NEEDLE UF SHORT 31 gauge x 5/16" Ndle USE TO INJECT NOVOLOG FLEXPEN BEFORE MEALS 150 each 11    blood sugar diagnostic (ACCU-CHEK SMARTVIEW TEST STRIP) Strp 1 strip by Misc.(Non-Drug; Combo Route) route 2 (two) times daily. 300 each 3    buPROPion (WELLBUTRIN XL) 300 MG 24 hr tablet Take 1 tablet (300 mg total) by mouth once daily. 30 tablet 6    carvedilol (COREG) 25 MG tablet Take 1 tablet (25 mg total) by mouth 2 (two) times daily. 180 tablet 2    dantrolene (DANTRIUM) 50 MG Cap Take 1 capsule (50 mg total) by mouth 3 (three) times daily. 270 capsule 3    DULCOLAX, BISACODYL, ORAL Take by mouth as needed (constipation).      ergocalciferol (ERGOCALCIFEROL) 50,000 unit Cap 1 capsule (50,000 Units total) by Per G Tube route every 7 days. (Patient taking differently: Take 50,000 Units by mouth every 7 days. Takes on Thurs) 12 capsule 2    famotidine (PEPCID) 20 MG tablet Take 1 tablet (20 mg total) by mouth once daily. 90 tablet 2    ferrous sulfate 220 mg (44 mg iron)/5 mL solution 10ml daily for Iron/Give via PEG (Patient taking differently: Take 220 mg by mouth every morning. ) 300 mL 6    flash glucose scanning reader (FREESTYLE JOSÉ LUIS 14 DAY READER) Misc Use as directed. Scan 4 times a day. 1 each 0    flash glucose sensor (FREESTYLE JOSÉ LUIS 14 DAY SENSOR) Kit Change every 14 days. 2 kit 11    folic acid (FOLVITE) 1 MG tablet Take 1 tablet (1 mg total) by mouth once daily. 90 tablet 2    furosemide (LASIX) 40 MG tablet Take 1 tablet (40 mg total) by mouth once daily. (Patient taking differently: Take 40 mg by mouth every morning. ) 90 tablet 2    insulin (LANTUS SOLOSTAR U-100 INSULIN) glargine 100 units/mL (3mL) SubQ pen 12 units Subcutaneously at Bedtime 3 mL 6    insulin glargine (LANTUS U-100 INSULIN) 100 unit/mL injection 10 units daily in the PM 10 mL " 6    levETIRAcetam (KEPPRA) 500 MG Tab Take 1 tablet (500 mg total) by mouth every 8 (eight) hours. 270 tablet 2    lidocaine-prilocaine (EMLA) cream Apply topically as needed. 30 g 1    polyethylene glycol 3350 (MIRALAX ORAL) Take by mouth every evening.       pregabalin (LYRICA) 25 MG capsule 1 cap in AM and 2 caps at Bedtime for muscle spasm 90 capsule 4    sevelamer carbonate (RENVELA) 800 mg Tab Take 800 mg by mouth 3 (three) times daily with meals.      sodium bicarbonate 650 MG tablet Take 2 tablets (1,300 mg total) by mouth 2 (two) times daily. 360 tablet 2    traMADol (ULTRAM) 50 mg tablet 1 tab Q6-8 hours as needed for joint pain 30 tablet 0     Current Facility-Administered Medications   Medication Dose Route Frequency Provider Last Rate Last Dose    onabotulinumtoxina injection 300 Units  300 Units Intramuscular 1 time in Clinic/HOD Monty Pardo MD           Review of patient's allergies indicates:   Allergen Reactions    Lisinopril Other (See Comments)     Angioedema      Vicodin [hydrocodone-acetaminophen] Rash     No problem with acetaminophen        Review of Systems   Constitutional: Negative.  Negative for diaphoresis, fatigue and fever.   HENT: Negative.  Negative for mouth sores, sore throat, tinnitus and trouble swallowing.    Eyes: Negative.  Negative for visual disturbance.   Respiratory: Negative.  Negative for cough, choking, chest tightness and shortness of breath.    Cardiovascular: Negative for chest pain, palpitations and leg swelling.   Gastrointestinal: Positive for abdominal pain and constipation. Negative for abdominal distention, blood in stool (with constipation), diarrhea, nausea and vomiting.   Genitourinary: Negative for frequency, hematuria and menstrual problem.   Musculoskeletal: Positive for arthralgias, back pain and joint swelling. Negative for myalgias, neck pain and neck stiffness.   Skin: Negative.  Negative for rash.   Neurological: Negative.  Negative  for dizziness, syncope, weakness, light-headedness and numbness.   Hematological: Negative for adenopathy. Does not bruise/bleed easily.   Psychiatric/Behavioral: Positive for dysphoric mood (on wellbutrin; never on duloxetine; ) and sleep disturbance. The patient is nervous/anxious.         Dx w bipolar disorder >3 yrs. Was followed by [facility] until her CVA 3 yrs ago.           Past Surgical History:   Procedure Laterality Date    ARTHROSCOPY-SHOULDER WITH SUBACROMIAL DECOMPRESSION Right 7/9/2014    [facility]    AV GRAFT CREATION Right 10/18/2018    [facility]formed by [facility]ir [facility] at [facility] 2ND FLR    Biceps Tenotomy Right 7/9/2014    [facility] OR    BREAST SURGERY      breast reduction    COLONOSCOPY N/A 8/11/2016    Performed by Jerry [facility]chis [facility] at [facility] ENDO (4TH FLR)    DEBRIDEMENT-SHOULDER-ARTHROSCOPIC Labral Cuff Right 7/9/2014    [facility] OR    ESOPHAGOGASTRODUODENOSCOPY (EGD) N/A 12/6/2017    [facility] Dallas [facility]    [id number]-[id number] END Right 7/9/2014    [facility] OR    Fistulogram Right 2/11/2019    Performed by [facility] at [facility] CATH LAB    HYSTERECTOMY      ROTATOR CUFF REPAIR Right July 9, 2014    right side    Skull surgery      Aneurysm    stent placed      in vertebral artery    TOTAL REDUCTION MAMMOPLASTY      TUBAL LIGATION         Family History   Problem Relation Age of Onset    Cancer Mother 55        Breast cancer    Diabetes Mother     Hypertension Mother     Heart disease Mother     Heart attack Mother     Breast cancer Mother     Stroke Sister     Hypertension Sister     Sleep apnea Sister     No Known Problems Daughter     Diabetes Son         controlled    Bell's palsy Sister     Lupus Sister     Blindness Maternal Grandmother      "Diabetes Unknown         "My entire family family has diabetes"    Stroke Maternal Aunt     Amblyopia Neg Hx     Cataracts Neg Hx     Glaucoma Neg Hx     Macular degeneration Neg Hx     Retinal detachment Neg Hx     Strabismus Neg Hx        Social History     Tobacco Use    Smoking status: Never Smoker    Smokeless tobacco: Never Used   Substance Use Topics    Alcohol use: No     Comment: occasional wine cooler     Drug use: No       Objective:       ,/79   Pulse 72   Ht 5' 3" (1.6 m)   Wt 104.9 kg (231 lb 4.2 oz)   LMP  (LMP Unknown)   BMI 40.97 kg/m²     Physical Exam      4/12/19: CBC Hg 10.2; Ht 33; hi indices; CMP cnne 7.8; glucose 425; P 5.9;   4/11/19: ESR 33 (36); CRP 2.4; RF 68; CCP neg; ABEL neg;  8/25/18: UA 3+ pr; 2+ leuk;10 WBC; many bact;       9/20/18: CSpine: OA changes; no A-A subluxation;   5/8/18: MRI shoulder: Supraspinatus and infraspinatus prominent tendinosis with low-grade undersurface tearing of the supraspinatus insertion.  Rotator cuff mild muscle bulk loss with nonspecific edema within the supraspinatus and subscapularis.  Circumferential fraying of the labrum. Biceps tendinosis.  Assessment:   Hx of seronegative RA   Was on HCQ & prednisone with good results.   Current clinical px not c/w RA despite +RF  Joint pain--multiple sites c/w generalized OA and STR   L knee & L ankle, L shoulder (rotator cuff dysfcn), LBP  Generalized OA  S/P CVA w l sided spastic hemiparesis  Non traumatic intracerebral hemorrhage with cognitive deficits.  ESRD on HD  DM II  HTN  Dyslipidemia  Anxiety/depression/bipolar  Undocumented hx of sarcoid  Morbid obesitiy      Plan:   Labs & imaging.  Get old records.  Discussed lack of evidence to support RA and Sarcoid at present.  Discussed likelihood of OA causing joint pain.   No therapeutic intervention at present.  We will contact patient if based on w/u she needs to be seen by us again.     CC: Beverly Max MD      6/13/19: ESR 80 (36); " CRP 2.6;     6/13/19: Arthritis survey: personally viewed: Cervical spine: There is stable height and radiographic appearance of the cervical vertebral bodies with loss of disc space height and endplate osteophyte formation again demonstrated particularly at C4-C5 and C5-C6.  No erosive changes are demonstrated.  Postoperative findings in the cranium are again demonstrated.  Bilateral hands: There is closed positioning of the left hand.  Clinical correlation for contracture is recommended.  Allowing for this, there are no sites of cortical destruction, periosteal reaction or other acute osseous abnormalities in the included regions.    Bilateral knees: Moderate osteoarthritic findings are demonstrated on the left greater medially, but with no sites of fracture, periosteal reaction or cortical destruction in the included regions.  Atherosclerotic calcifications are incidentally noted.  Bilateral feet: There is decreased density of included osseous structures as well as mild degenerative findings of the interphalangeal joints.     6/13/19: CXR: personally viewed: There is stable appearance of the cardiomediastinal shadow.  Atherosclerotic calcifications are again demonstrated within the aortic arch.  There is no hilar enlargement or radiographic evidence of pulmonary edema.  The lungs are also stable in appearance with with both lungs appearing fully expanded and clear of infiltrate.  The hemidiaphragms remain sharply outlined and the costophrenic angles are acute with no pleural effusion demonstrated. R hemidiaphragm elevated. There is curve of the included spine with convexity towards the patient's right with degenerative findings also within this portion of the spine.

## 2019-06-13 ENCOUNTER — HOSPITAL ENCOUNTER (OUTPATIENT)
Dept: RADIOLOGY | Facility: HOSPITAL | Age: 59
Discharge: HOME | End: 2019-06-13
Attending: INTERNAL MEDICINE
Payer: MEDICARE

## 2019-06-13 ENCOUNTER — HOSPITAL ENCOUNTER (OUTPATIENT)
Dept: RADIOLOGY | Facility: HOSPITAL | Age: 59
Discharge: HOME | End: 2019-06-13
Attending: PHYSICIAN ASSISTANT
Payer: MEDICARE

## 2019-06-13 ENCOUNTER — OFFICE VISIT (OUTPATIENT)
Dept: ORTHOPEDICS | Facility: CLINIC | Age: 59
End: 2019-06-13
Payer: MEDICARE

## 2019-06-13 ENCOUNTER — OFFICE VISIT (OUTPATIENT)
Dept: RHEUMATOLOGY | Facility: CLINIC | Age: 59
End: 2019-06-13
Payer: MEDICARE

## 2019-06-13 VITALS — HEIGHT: 63 IN | BODY MASS INDEX: 40.97 KG/M2 | WEIGHT: 231.25 LBS

## 2019-06-13 VITALS
SYSTOLIC BLOOD PRESSURE: 139 MMHG | WEIGHT: 231.25 LBS | HEART RATE: 72 BPM | DIASTOLIC BLOOD PRESSURE: 79 MMHG | BODY MASS INDEX: 40.97 KG/M2 | HEIGHT: 63 IN

## 2019-06-13 DIAGNOSIS — R89.9 ABNORMAL LABORATORY TEST RESULT: ICD-10-CM

## 2019-06-13 DIAGNOSIS — M25.562 LEFT KNEE PAIN, UNSPECIFIED CHRONICITY: ICD-10-CM

## 2019-06-13 DIAGNOSIS — M15.9 GENERALIZED OSTEOARTHRITIS OF MULTIPLE SITES: ICD-10-CM

## 2019-06-13 DIAGNOSIS — Z86.2 HISTORY OF SARCOIDOSIS: ICD-10-CM

## 2019-06-13 DIAGNOSIS — Z87.39 HISTORY OF RHEUMATOID ARTHRITIS: ICD-10-CM

## 2019-06-13 DIAGNOSIS — N18.6 ESRD (END STAGE RENAL DISEASE): ICD-10-CM

## 2019-06-13 DIAGNOSIS — M25.561 PAIN IN BOTH KNEES, UNSPECIFIED CHRONICITY: Primary | ICD-10-CM

## 2019-06-13 DIAGNOSIS — M25.562 PAIN IN BOTH KNEES, UNSPECIFIED CHRONICITY: ICD-10-CM

## 2019-06-13 DIAGNOSIS — M25.561 PAIN IN BOTH KNEES, UNSPECIFIED CHRONICITY: ICD-10-CM

## 2019-06-13 DIAGNOSIS — M17.12 PRIMARY OSTEOARTHRITIS OF LEFT KNEE: Primary | ICD-10-CM

## 2019-06-13 DIAGNOSIS — Z87.39 HISTORY OF RHEUMATOID ARTHRITIS: Primary | ICD-10-CM

## 2019-06-13 DIAGNOSIS — F41.9 ANXIETY AND DEPRESSION: ICD-10-CM

## 2019-06-13 DIAGNOSIS — M25.562 PAIN IN BOTH KNEES, UNSPECIFIED CHRONICITY: Primary | ICD-10-CM

## 2019-06-13 DIAGNOSIS — F32.A ANXIETY AND DEPRESSION: ICD-10-CM

## 2019-06-13 PROCEDURE — 99204 OFFICE O/P NEW MOD 45 MIN: CPT | Mod: HCNC,S$GLB,, | Performed by: PHYSICIAN ASSISTANT

## 2019-06-13 PROCEDURE — 99204 PR OFFICE/OUTPT VISIT, NEW, LEVL IV, 45-59 MIN: ICD-10-PCS | Mod: HCNC,S$GLB,, | Performed by: PHYSICIAN ASSISTANT

## 2019-06-13 PROCEDURE — 73560 XR KNEE 1 OR 2 VIEW LEFT: ICD-10-PCS | Mod: 26,HCNC,LT, | Performed by: RADIOLOGY

## 2019-06-13 PROCEDURE — 99499 UNLISTED E&M SERVICE: CPT | Mod: HCNC,S$GLB,TXP, | Performed by: INTERNAL MEDICINE

## 2019-06-13 PROCEDURE — 77077 XR ARTHRITIS SURVEY: ICD-10-PCS | Mod: 26,HCNC,NTX, | Performed by: RADIOLOGY

## 2019-06-13 PROCEDURE — 71046 X-RAY EXAM CHEST 2 VIEWS: CPT | Mod: TC,HCNC,TXP

## 2019-06-13 PROCEDURE — 3008F PR BODY MASS INDEX (BMI) DOCUMENTED: ICD-10-PCS | Mod: HCNC,CPTII,S$GLB, | Performed by: PHYSICIAN ASSISTANT

## 2019-06-13 PROCEDURE — 99205 OFFICE O/P NEW HI 60 MIN: CPT | Mod: HCNC,NTX,S$GLB, | Performed by: INTERNAL MEDICINE

## 2019-06-13 PROCEDURE — 99999 PR PBB SHADOW E&M-EST. PATIENT-LVL II: ICD-10-PCS | Mod: PBBFAC,HCNC,, | Performed by: PHYSICIAN ASSISTANT

## 2019-06-13 PROCEDURE — 99499 RISK ADDL DX/OHS AUDIT: ICD-10-PCS | Mod: HCNC,S$GLB,TXP, | Performed by: INTERNAL MEDICINE

## 2019-06-13 PROCEDURE — 71046 X-RAY EXAM CHEST 2 VIEWS: CPT | Mod: 26,HCNC,NTX, | Performed by: RADIOLOGY

## 2019-06-13 PROCEDURE — 99999 PR PBB SHADOW E&M-EST. PATIENT-LVL IV: ICD-10-PCS | Mod: PBBFAC,HCNC,TXP, | Performed by: INTERNAL MEDICINE

## 2019-06-13 PROCEDURE — 71046 XR CHEST PA AND LATERAL: ICD-10-PCS | Mod: 26,HCNC,NTX, | Performed by: RADIOLOGY

## 2019-06-13 PROCEDURE — 99999 PR PBB SHADOW E&M-EST. PATIENT-LVL IV: CPT | Mod: PBBFAC,HCNC,TXP, | Performed by: INTERNAL MEDICINE

## 2019-06-13 PROCEDURE — 77077 JOINT SURVEY SINGLE VIEW: CPT | Mod: TC,HCNC,NTX

## 2019-06-13 PROCEDURE — 99205 PR OFFICE/OUTPT VISIT, NEW, LEVL V, 60-74 MIN: ICD-10-PCS | Mod: HCNC,NTX,S$GLB, | Performed by: INTERNAL MEDICINE

## 2019-06-13 PROCEDURE — 99999 PR PBB SHADOW E&M-EST. PATIENT-LVL II: CPT | Mod: PBBFAC,HCNC,, | Performed by: PHYSICIAN ASSISTANT

## 2019-06-13 PROCEDURE — 73560 X-RAY EXAM OF KNEE 1 OR 2: CPT | Mod: TC,HCNC,LT,TXP

## 2019-06-13 PROCEDURE — 3008F BODY MASS INDEX DOCD: CPT | Mod: HCNC,CPTII,S$GLB, | Performed by: PHYSICIAN ASSISTANT

## 2019-06-13 PROCEDURE — 77077 JOINT SURVEY SINGLE VIEW: CPT | Mod: 26,HCNC,NTX, | Performed by: RADIOLOGY

## 2019-06-13 PROCEDURE — 73560 X-RAY EXAM OF KNEE 1 OR 2: CPT | Mod: 26,HCNC,LT, | Performed by: RADIOLOGY

## 2019-06-13 PROCEDURE — 3008F BODY MASS INDEX DOCD: CPT | Mod: HCNC,CPTII,NTX,S$GLB | Performed by: INTERNAL MEDICINE

## 2019-06-13 PROCEDURE — 3008F PR BODY MASS INDEX (BMI) DOCUMENTED: ICD-10-PCS | Mod: HCNC,CPTII,NTX,S$GLB | Performed by: INTERNAL MEDICINE

## 2019-06-13 ASSESSMENT — ROUTINE ASSESSMENT OF PATIENT INDEX DATA (RAPID3)
WHEN YOU AWAKENED IN THE MORNING OVER THE LAST WEEK, PLEASE INDICATE THE AMOUNT OF TIME IT TAKES UNTIL YOU ARE AS LIMBER AS YOU WILL BE FOR THE DAY: ALL DAY
MDHAQ FUNCTION SCORE: 2.2
PSYCHOLOGICAL DISTRESS SCORE: 8.8
PATIENT GLOBAL ASSESSMENT SCORE: 10
TOTAL RAPID3 SCORE: 7.44
FATIGUE SCORE: 10
PAIN SCORE: 5
AM STIFFNESS SCORE: 1, YES

## 2019-06-13 NOTE — LETTER
June 13, 2019      Beverly Muniz MD  [address]  [place] [address]           [facility]  [address]  [place] [address]  Phone: [phone]  Fax: [phone]          Patient: Lucy Perez   MR Number: [id number]   YOB: 1960   Date of Visit: 6/13/2019       Dear Dr. Beverly Muniz:    Thank you for referring Lucy Perez to me for evaluation. Attached you will find relevant portions of my assessment and plan of care.    If you have questions, please do not hesitate to call me. I look forward to following Lucy Perez along with you.    Sincerely,    Briseida Noguera MD    Enclosure  CC:  No Recipients    If you would like to receive this communication electronically, please contact [email or web address] or [phone] to request more information on [facility] access.    For providers and/or their staff who would like to refer a patient to [facility], please contact us through our one-stop-shop provider referral line, [facility], at [phone].    If you feel you have received this communication in error or would no longer like to receive these types of communications, please e-mail [email or web address]

## 2019-06-13 NOTE — PROGRESS NOTES
"  SUBJECTIVE:     Chief Complaint & History of Present Illness:  Lucy Perez is a 58 y.o. year old female, new patient referred to Orthopedics by Dr. Max, here with a history of constant left knee pain which started years ago.  There is not a history of trauma.  Patient had a stroke in 2016 and states that not long after her knee began bothering her. She states does have RA. The pain is located in the global aspect of the knee.  The pain is described as sharp, 10/10.  There is not radiation.  There is not catching or locking.  Aggravating factors include any movement.  Associated symptoms include severe discomfort.  There is not numbness or tingling of the lower extremity.  There is not back pain. Previous treatments include acetaminophen, rest and cortisone injection (3 years ago) which have provided minimal relief.  There is not a history of previous injury or surgery to the knee.  The patient does use an assistive device.    Review of patient's allergies indicates:   Allergen Reactions    Lisinopril Other (See Comments)     Angioedema      Vicodin [hydrocodone-acetaminophen] Rash     No problem with acetaminophen          Current Outpatient Medications   Medication Sig Dispense Refill    acetaminophen (TYLENOL) 325 MG tablet Take 2 tablets (650 mg total) by mouth every 6 (six) hours as needed for Pain.  0    ALPRAZolam (XANAX) 0.5 MG tablet 1 tab Every 8 hours as needed for anxiety 30 tablet 0    aspirin (ECOTRIN) 81 MG EC tablet Take 81 mg by mouth every morning.       atorvastatin (LIPITOR) 40 MG tablet TAKE 1 TABLET ONE TIME DAILY FOR CHOLESTEROL 90 tablet 2    BD INSULIN PEN NEEDLE UF SHORT 31 gauge x 5/16" Ndle USE TO INJECT NOVOLOG FLEXPEN BEFORE MEALS 150 each 11    blood sugar diagnostic (ACCU-CHEK SMARTVIEW TEST STRIP) Strp 1 strip by Misc.(Non-Drug; Combo Route) route 2 (two) times daily. 300 each 3    buPROPion (WELLBUTRIN XL) 300 MG 24 hr tablet Take 1 tablet (300 mg total) " by mouth once daily. 30 tablet 6    carvedilol (COREG) 25 MG tablet Take 1 tablet (25 mg total) by mouth 2 (two) times daily. 180 tablet 2    dantrolene (DANTRIUM) 50 MG Cap Take 1 capsule (50 mg total) by mouth 3 (three) times daily. 270 capsule 3    DULCOLAX, BISACODYL, ORAL Take by mouth as needed (constipation).      ergocalciferol (ERGOCALCIFEROL) 50,000 unit Cap 1 capsule (50,000 Units total) by Per G Tube route every 7 days. (Patient taking differently: Take 50,000 Units by mouth every 7 days. Takes on Thurs) 12 capsule 2    famotidine (PEPCID) 20 MG tablet Take 1 tablet (20 mg total) by mouth once daily. 90 tablet 2    ferrous sulfate 220 mg (44 mg iron)/5 mL solution 10ml daily for Iron/Give via PEG (Patient taking differently: Take 220 mg by mouth every morning. ) 300 mL 6    flash glucose scanning reader (Message MissileSTYLE JOSÉ LUIS 14 DAY READER) Misc Use as directed. Scan 4 times a day. 1 each 0    flash glucose sensor (FREESTYLE JOSÉ LUIS 14 DAY SENSOR) Kit Change every 14 days. 2 kit 11    folic acid (FOLVITE) 1 MG tablet Take 1 tablet (1 mg total) by mouth once daily. 90 tablet 2    furosemide (LASIX) 40 MG tablet Take 1 tablet (40 mg total) by mouth once daily. (Patient taking differently: Take 40 mg by mouth every morning. ) 90 tablet 2    insulin (LANTUS SOLOSTAR U-100 INSULIN) glargine 100 units/mL (3mL) SubQ pen 12 units Subcutaneously at Bedtime 3 mL 6    insulin glargine (LANTUS U-100 INSULIN) 100 unit/mL injection 10 units daily in the PM 10 mL 6    levETIRAcetam (KEPPRA) 500 MG Tab Take 1 tablet (500 mg total) by mouth every 8 (eight) hours. 270 tablet 2    lidocaine-prilocaine (EMLA) cream Apply topically as needed. 30 g 1    polyethylene glycol 3350 (MIRALAX ORAL) Take by mouth every evening.       pregabalin (LYRICA) 25 MG capsule 1 cap in AM and 2 caps at Bedtime for muscle spasm 90 capsule 4    sevelamer carbonate (RENVELA) 800 mg Tab Take 800 mg by mouth 3 (three) times daily with  meals.      sodium bicarbonate 650 MG tablet Take 2 tablets (1,300 mg total) by mouth 2 (two) times daily. 360 tablet 2    traMADol (ULTRAM) 50 mg tablet 1 tab Q6-8 hours as needed for joint pain 30 tablet 0    albuterol (PROVENTIL) 2.5 mg /3 mL (0.083 %) nebulizer solution Take 3 mLs (2.5 mg total) by nebulization every 6 (six) hours as needed for Wheezing. Rescue 25 each 3     Current Facility-Administered Medications   Medication Dose Route Frequency Provider Last Rate Last Dose    onabotulinumtoxina injection 300 Units  300 Units Intramuscular 1 time in Clinic/HOD Monty Pardo MD           Past Medical History:   Diagnosis Date    Anemia of chronic disease 6/10/2017    Anxiety     Arthritis of right acromioclavicular joint 7/2/2014    Asthma     Bipolar disorder     Bronchitis, acute     Cataract     Cholelithiasis     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    Cortical cataract of both eyes 7/26/2016    Decubitus ulcer of buttock, stage 2     Degeneration of lumbar or lumbosacral intervertebral disc 3/5/2013    S/p MRI L-spine 5/2009     Depression     ESRD on hemodialysis     Started August 2018    General anesthetics causing adverse effect in therapeutic use     Hemorrhagic cerebrovascular accident (CVA)     8/2016 s/p [facility]raniotomy at [facility] with Left hemiparesis    Hemorrhagic cerebrovascular accident (CVA) 1/3/2018    History of stroke 6/28/2017    s/p R-MCA stroke with R-putaminal hemorrhagic transformation in 8/2016 and 11/2016 (s/p hemicraniotomy at [facility]) with residual L hemiparesis, on AED s/p CVA      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    Hyperlipidemia     Hypertensive retinopathy of both eyes 7/26/2016    Impingement syndrome of right shoulder 7/2/2014    Left ventricular diastolic dysfunction with preserved systolic function 12/15/2015    Mixed anxiety and depressive disorder 3/5/2013    Obesity     THERESA (obstructive sleep apnea) 3/5/2013     No Home CPAP 2ndary to cost     Partial symptomatic epilepsy with complex partial seizures, not intractable, without status epilepticus     PEG (percutaneous endoscopic gastrostomy) status 6/28/2017    Renovascular hypertension 3/5/2013    Will resume home medications: amlodipine, labetalol, and hydralazine Will hold Lisinopril in setting of DARLING.   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 without macular edema, without long-term current use of insulin 8/2/2017    Type 2 diabetes mellitus with diabetic polyneuropathy, without long-term current use of insulin 10/22/2015    Type 2 diabetes mellitus with stage 3 chronic kidney disease, without long-term current use of insulin 10/22/2015    Vertebral artery stenosis 3/5/2013    S/p Stenting per Dr Burnett        Past Surgical History:   Procedure Laterality Date    ARTHROSCOPY-SHOULDER WITH SUBACROMIAL DECOMPRESSION Right 7/9/2014    [facility] OR    AV GRAFT CREATION Right 10/18/2018    [facility] 2ND FLR    Biceps Tenotomy Right 7/9/2014    [facility] at [facility] OR    BREAST SURGERY      breast reduction    COLONOSCOPY N/A 8/11/2016    [facility]formed by Jerry [facility]is [facility] at [facility] ENDO (4TH FLR)    DEBRIDEMENT-SHOULDER-ARTHROSCOPIC Labral Cuff Right 7/9/2014    [facility] at [facility] OR    ESOPHAGOGASTRODUODENOSCOPY (EGD) N/A 12/6/2017    [facility] by Dallas Lock Jr., [facility] at [facility] ENDO    [id number] END Right 7/9/2014    [facility] at [facility] OR    Fistulogram Right 2/11/2019  "   [facility] CATH LAB    HYSTERECTOMY      ROTATOR CUFF REPAIR Right July 9, 2014    right side    Skull surgery      Aneurysm    stent placed      in vertebral artery    TOTAL REDUCTION MAMMOPLASTY      TUBAL LIGATION         Vital Signs (Most Recent)  There were no vitals filed for this visit.        Review of Systems:  ROS:  Constitutional: no fever or chills  Eyes: no visual changes  ENT: no nasal congestion or sore throat  Respiratory: no cough or shortness of breath  Cardiovascular: no chest pain or palpitations, positive for HTN  Gastrointestinal: no nausea or vomiting, tolerating diet  Genitourinary: no hematuria or dysuria, positive for ESRD of dialysis  Integument/Breast: no rash or pruritis  Hematologic/Lymphatic: no easy bruising or lymphadenopathy  Musculoskeletal: positive for left knee pain  Neurological: positive for hx of CVA, left spastic hemiparesis, partial seizures  Behavioral/Psych: no auditory or visual hallucinations, positive for anxiety and depression  Endocrine: no heat or cold intolerance, positive for T2DM    OBJECTIVE:     PHYSICAL EXAM:  Height: 5' 3" (160 cm) Weight: 104.9 kg (231 lb 4.2 oz), General Appearance: Well nourished, well developed, in no acute distress.  Neurological: Mood & affect are normal.  left  Knee Exam:  Knee Range of Motion: patient not able to demonstrates due to hx of CVA   Effusion:none  Condition of skin:intact  Location of tenderness:Medial joint line and Lateral joint line   Strength:0 of 5  Stability:  stable to testing  Varus /Valgus stress:  normal  Emily:   negative/negative    RADIOGRAPHS:  Xray of left knee taken in clinic today, images reviewed by me, demonstrates moderate tricompartmental narrowing of left knee joint without evidence of fracture or dislocation.  On radiology report, it is stated of a "mottled" appearance on tibial shaft and that infectious or infiltrative process cannot be ruled " out.    ASSESSMENT/PLAN:     Plan: We discussed with the patient at length all the different treatment options available for the knee including anti-inflammatories, acetaminophen, rest, ice, knee strengthening exercise, occasional cortisone injections for temporary relief, Viscosupplimentation injections, arthroscopic debridement osteotomy, and finally knee arthroplasty.   I will contact patient's PCP to discuss further treatment as she states she has metal plate in her head from aneurysm, so this would eliminate MRI.  Patient would like to see Bernie WOODARD for strengthening and gait training.  She received patella stabilization knee brace in clinic today.  I will prescribe compound cream for knee pain.  I would like to refer her to pain management, but will discuss with her PCP.  Patient verbalized understanding.    Final Diagnosis: Primary osteoarthritis knee, left.  Left knee pain.

## 2019-06-13 NOTE — LETTER
June 13, 2019      Beverly Muniz MD  [address]  [facility] [address]           [facility]  [address], [address]  [facility] [address]  Phone: [phone]          Patient: Lucy Perez   MR Number: [id number]   YOB: 1960   Date of Visit: 6/13/2019       Dear Dr. Beverly Muniz:    Thank you for referring Lucy Perez to me for evaluation. Attached you will find relevant portions of my assessment and plan of care.    If you have questions, please do not hesitate to call me. I look forward to following Lucy Perez along with you.    Sincerely,    Dante Nicholas PA-C    Enclosure  CC:  No Recipients    If you would like to receive this communication electronically, please contact [email or web address] or [phone] to request more information on [employer] access.    For providers and/or their staff who would like to refer a patient to [facility], please contact us through our one-stop-shop provider referral line, [facility], at [phone].    If you feel you have received this communication in error or would no longer like to receive these types of communications, please e-mail [email or web address]

## 2019-06-13 NOTE — PROGRESS NOTES
Patient, Lucy Perez (MRN #[id number]), presented with a recorded BMI of 40.97 kg/m^2 consistent with the definition of morbid obesity (ICD-10 E66.01). The patient's morbid obesity was monitored, evaluated, addressed and/or treated. This addendum to the medical record is made on 06/13/2019.

## 2019-06-17 ENCOUNTER — HOSPITAL ENCOUNTER (OUTPATIENT)
Dept: RADIOLOGY | Facility: HOSPITAL | Age: 59
Discharge: HOME | End: 2019-06-17
Attending: PHYSICIAN ASSISTANT
Payer: MEDICARE

## 2019-06-17 DIAGNOSIS — M25.562 LEFT KNEE PAIN, UNSPECIFIED CHRONICITY: ICD-10-CM

## 2019-06-17 DIAGNOSIS — R93.89 ABNORMAL X-RAY: Primary | ICD-10-CM

## 2019-06-17 DIAGNOSIS — R93.89 ABNORMAL X-RAY: ICD-10-CM

## 2019-06-17 NOTE — PROGRESS NOTES
Will get repeat imaging of knee to compare with previous knee images since previous knee image shows abnormality in proximal tibia.  Patient verbalized understanding.    Dante Nicholas PA-C  6/17/19

## 2019-06-18 ENCOUNTER — HOSPITAL ENCOUNTER (OUTPATIENT)
Dept: RADIOLOGY | Facility: HOSPITAL | Age: 59
Discharge: HOME | End: 2019-06-18
Attending: PHYSICIAN ASSISTANT
Payer: MEDICARE

## 2019-06-18 DIAGNOSIS — M25.562 LEFT KNEE PAIN, UNSPECIFIED CHRONICITY: ICD-10-CM

## 2019-06-18 DIAGNOSIS — R93.89 ABNORMAL X-RAY: ICD-10-CM

## 2019-06-18 DIAGNOSIS — M17.12 PRIMARY OSTEOARTHRITIS OF LEFT KNEE: Primary | ICD-10-CM

## 2019-06-18 DIAGNOSIS — R29.898 LEG WEAKNESS, BILATERAL: ICD-10-CM

## 2019-06-18 PROCEDURE — 73562 X-RAY EXAM OF KNEE 3: CPT | Mod: 26,50,HCNC,TXP | Performed by: RADIOLOGY

## 2019-06-18 PROCEDURE — 73562 X-RAY EXAM OF KNEE 3: CPT | Mod: 50,TC,HCNC,NTX

## 2019-06-18 PROCEDURE — 73562 XR KNEE 3 VIEW BILATERAL: ICD-10-PCS | Mod: 26,50,HCNC,TXP | Performed by: RADIOLOGY

## 2019-06-19 ENCOUNTER — TELEPHONE (OUTPATIENT)
Dept: VASCULAR SURGERY | Facility: CLINIC | Age: 59
End: 2019-06-19

## 2019-06-19 DIAGNOSIS — T82.868A THROMBOSIS OF ARTERIOVENOUS GRAFT, INITIAL ENCOUNTER: Primary | ICD-10-CM

## 2019-06-19 NOTE — TELEPHONE ENCOUNTER
Contacted patient's  in response to message. [facility] patient was not able to dialyze due to clotted arm graft. [facility] patient had two granola bars at 1330 and has not had anything to eat or drink since. Will discuss with Dr. Irwin and will contact patient/ with response.  verbalized understanding.

## 2019-06-20 ENCOUNTER — HOSPITAL ENCOUNTER (OUTPATIENT)
Facility: HOSPITAL | Age: 59
Discharge: HOME | End: 2019-06-20
Attending: SURGERY | Admitting: SURGERY
Payer: MEDICARE

## 2019-06-20 VITALS
OXYGEN SATURATION: 97 % | DIASTOLIC BLOOD PRESSURE: 71 MMHG | BODY MASS INDEX: 38.27 KG/M2 | WEIGHT: 216 LBS | TEMPERATURE: 98 F | HEART RATE: 60 BPM | SYSTOLIC BLOOD PRESSURE: 135 MMHG | HEIGHT: 63 IN | RESPIRATION RATE: 17 BRPM

## 2019-06-20 DIAGNOSIS — T82.868A THROMBOSIS OF ARTERIOVENOUS GRAFT, INITIAL ENCOUNTER: ICD-10-CM

## 2019-06-20 LAB
ALBUMIN SERPL BCP-MCNC: 3.3 G/DL (ref 3.5–5.2)
ANION GAP SERPL CALC-SCNC: 14 MMOL/L (ref 8–16)
BUN SERPL-MCNC: 98 MG/DL (ref 6–20)
CALCIUM SERPL-MCNC: 8.3 MG/DL (ref 8.7–10.5)
CHLORIDE SERPL-SCNC: 99 MMOL/L (ref 95–110)
CO2 SERPL-SCNC: 26 MMOL/L (ref 23–29)
CREAT SERPL-MCNC: 8.5 MG/DL (ref 0.5–1.4)
EST. GFR  (AFRICAN AMERICAN): 5.3 ML/MIN/1.73 M^2
EST. GFR  (NON AFRICAN AMERICAN): 4.6 ML/MIN/1.73 M^2
GLUCOSE SERPL-MCNC: 130 MG/DL (ref 70–110)
PHOSPHATE SERPL-MCNC: 6.3 MG/DL (ref 2.7–4.5)
POCT GLUCOSE: 163 MG/DL (ref 70–110)
POTASSIUM SERPL-SCNC: 5 MMOL/L (ref 3.5–5.1)
SODIUM SERPL-SCNC: 139 MMOL/L (ref 136–145)

## 2019-06-20 PROCEDURE — 27201423 OPTIME MED/SURG SUP & DEVICES STERILE SUPPLY: Mod: HCNC,NTX | Performed by: SURGERY

## 2019-06-20 PROCEDURE — 36904 THRMBC/NFS DIALYSIS CIRCUIT: CPT | Mod: HCNC,TXP | Performed by: SURGERY

## 2019-06-20 PROCEDURE — C1894 INTRO/SHEATH, NON-LASER: HCPCS | Mod: HCNC,NTX | Performed by: SURGERY

## 2019-06-20 PROCEDURE — 63600175 PHARM REV CODE 636 W HCPCS: Mod: HCNC,NTX | Performed by: SURGERY

## 2019-06-20 PROCEDURE — 25000003 PHARM REV CODE 250: Mod: HCNC,TXP | Performed by: SURGERY

## 2019-06-20 PROCEDURE — 36904 PR MECH THROMBECTOMY/INFUS, DIALYSIS CIRCUIT: ICD-10-PCS | Mod: HCNC,NTX,, | Performed by: SURGERY

## 2019-06-20 PROCEDURE — 99152 MOD SED SAME PHYS/QHP 5/>YRS: CPT | Mod: HCNC,NTX,, | Performed by: SURGERY

## 2019-06-20 PROCEDURE — 82962 GLUCOSE BLOOD TEST: CPT | Mod: HCNC,NTX

## 2019-06-20 PROCEDURE — 99152 PR MOD CONSCIOUS SEDATION, SAME PHYS, 5+ YRS, FIRST 15 MIN: ICD-10-PCS | Mod: HCNC,NTX,, | Performed by: SURGERY

## 2019-06-20 PROCEDURE — 25500020 PHARM REV CODE 255: Mod: HCNC,TXP | Performed by: SURGERY

## 2019-06-20 PROCEDURE — 80069 RENAL FUNCTION PANEL: CPT | Mod: HCNC,NTX

## 2019-06-20 PROCEDURE — 25000003 PHARM REV CODE 250: Mod: HCNC,TXP | Performed by: STUDENT IN AN ORGANIZED HEALTH CARE EDUCATION/TRAINING PROGRAM

## 2019-06-20 PROCEDURE — C1757 CATH, THROMBECTOMY/EMBOLECT: HCPCS | Mod: HCNC,TXP | Performed by: SURGERY

## 2019-06-20 PROCEDURE — 99153 MOD SED SAME PHYS/QHP EA: CPT | Mod: HCNC,TXP | Performed by: SURGERY

## 2019-06-20 PROCEDURE — C1887 CATHETER, GUIDING: HCPCS | Mod: HCNC,TXP | Performed by: SURGERY

## 2019-06-20 PROCEDURE — C1769 GUIDE WIRE: HCPCS | Mod: HCNC,TXP | Performed by: SURGERY

## 2019-06-20 PROCEDURE — 36904 THRMBC/NFS DIALYSIS CIRCUIT: CPT | Mod: HCNC,NTX,, | Performed by: SURGERY

## 2019-06-20 PROCEDURE — 99152 MOD SED SAME PHYS/QHP 5/>YRS: CPT | Mod: HCNC,NTX | Performed by: SURGERY

## 2019-06-20 RX ORDER — LIDOCAINE HYDROCHLORIDE 10 MG/ML
1 INJECTION, SOLUTION EPIDURAL; INFILTRATION; INTRACAUDAL; PERINEURAL ONCE
Status: COMPLETED | OUTPATIENT
Start: 2019-06-20 | End: 2019-06-20

## 2019-06-20 RX ORDER — CEFAZOLIN SODIUM 1 G/3ML
2 INJECTION, POWDER, FOR SOLUTION INTRAMUSCULAR; INTRAVENOUS
Status: DISCONTINUED | OUTPATIENT
Start: 2019-06-20 | End: 2019-06-20 | Stop reason: HOSPADM

## 2019-06-20 RX ORDER — MUPIROCIN 20 MG/G
OINTMENT TOPICAL
Status: DISCONTINUED | OUTPATIENT
Start: 2019-06-20 | End: 2019-06-20 | Stop reason: HOSPADM

## 2019-06-20 RX ORDER — SODIUM CHLORIDE 0.9 % (FLUSH) 0.9 %
10 SYRINGE (ML) INJECTION
Status: DISCONTINUED | OUTPATIENT
Start: 2019-06-20 | End: 2019-06-20 | Stop reason: HOSPADM

## 2019-06-20 RX ORDER — FENTANYL CITRATE 50 UG/ML
INJECTION, SOLUTION INTRAMUSCULAR; INTRAVENOUS
Status: DISCONTINUED | OUTPATIENT
Start: 2019-06-20 | End: 2019-06-20 | Stop reason: HOSPADM

## 2019-06-20 RX ORDER — MIDAZOLAM HYDROCHLORIDE 1 MG/ML
INJECTION, SOLUTION INTRAMUSCULAR; INTRAVENOUS
Status: DISCONTINUED | OUTPATIENT
Start: 2019-06-20 | End: 2019-06-20 | Stop reason: HOSPADM

## 2019-06-20 RX ORDER — NITROGLYCERIN 5 MG/ML
INJECTION, SOLUTION INTRAVENOUS
Status: DISCONTINUED | OUTPATIENT
Start: 2019-06-20 | End: 2019-06-20 | Stop reason: HOSPADM

## 2019-06-20 RX ORDER — HEPARIN SODIUM 200 [USP'U]/100ML
INJECTION, SOLUTION INTRAVENOUS
Status: DISCONTINUED | OUTPATIENT
Start: 2019-06-20 | End: 2019-06-20 | Stop reason: HOSPADM

## 2019-06-20 RX ORDER — HYDROCODONE BITARTRATE AND ACETAMINOPHEN 5; 325 MG/1; MG/1
1 TABLET ORAL EVERY 4 HOURS PRN
Status: DISCONTINUED | OUTPATIENT
Start: 2019-06-20 | End: 2019-06-20 | Stop reason: HOSPADM

## 2019-06-20 NOTE — H&P
HPI:   Patient is a 58 y.o. female with a history of HTN, HLD, L hemiplegia after large stroke in 2016, DM II, stage V CKD since August 2018 on HD MWF    She has a residual impairment on her left upper extremity and left lower extremity from her stroke and is currently wheel chair bound. She has complete function of her right arm currently. She is right handed. No history of trauma to right arm.   Was seeing Dr. Blank for nephology but he no longer works at [facility].     Noted to have thrombosed AVG yesterday. 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   No MI, but history of stroke in 2016 (hemorragic)   Tobacco use: never use   Renal is Dr LING Perez        Past Medical History:   Diagnosis Date    Anemia of chronic disease 6/10/2017    Anxiety     Arthritis of right acromioclavicular joint 7/2/2014    Asthma     Bipolar disorder     Bronchitis, acute     Cataract     Cholelithiasis     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    Cortical cataract of both eyes 7/26/2016    Decubitus ulcer of buttock, stage 2     Degeneration of lumbar or lumbosacral intervertebral disc 3/5/2013    S/p MRI L-spine 5/2009     Depression     ESRD on hemodialysis     Started August 2018    General anesthetics causing adverse effect in therapeutic use     Hemorrhagic cerebrovascular accident (CVA)     8[place] [facility] [place] hemiparesis    Hemorrhagic cerebrovascular accident (CVA) 1/3/2018    History of stroke 6/28/2017    s/p R-MCA stroke with R-putaminal hemorrhagic transformation in 8/2016 and 11/2016 ([place] [facility] with residual L hemiparesis, on AED s/p CVA     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    Hyperlipidemia     Hypertensive retinopathy of  both eyes 7/26/2016    Impingement syndrome of right shoulder 7/2/2014    Left ventricular diastolic dysfunction with preserved systolic function 12/15/2015    Mixed anxiety and depressive disorder 3/5/2013    Obesity     THERESA (obstructive sleep apnea) 3/5/2013    No Home CPAP 2ndary to cost     Partial symptomatic epilepsy with complex partial seizures, not intractable, without status epilepticus     PEG (percutaneous endoscopic gastrostomy) status 6/28/2017    Renovascular hypertension 3/5/2013    Will resume home medications: amlodipine, labetalol, and hydralazine Will hold Lisinopril in setting of DARLING.   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 without macular edema, without long-term current use of insulin 8/2/2017    Type 2 diabetes mellitus with diabetic polyneuropathy, without long-term current use of insulin 10/22/2015    Type 2 diabetes mellitus with stage 3 chronic kidney disease, without long-term current use of insulin 10/22/2015    Vertebral artery stenosis 3/5/2013    S/p Stenting per Dr Burnett            Past Surgical History:   Procedure Laterality Date    ARTHROSCOPY-SHOULDER WITH SUBACROMIAL DECOMPRESSION Right 7/9/2014    [facility]formed by [facility] at [facility] OR    AV GRAFT CREATION Right 10/18/2018    [facility]formed by [facility]ir [facility] at [facility] OR 2ND FLR    Biceps Tenotomy Right 7/9/2014    [facility] by [facility] at [facility] OR    BREAST SURGERY      breast reduction    COLONOSCOPY N/A 8/11/2016    Performed by Jerry Vilchis MD at [facility] ENDO (4TH FLR)    DEBRIDEMENT-SHOULDER-ARTHROSCOPIC Labral Cuff Right 7/9/2014    Performed by  "K[facility]all [facility]antoja [facility] at [facility] OR    ESOPHAGOGASTRODUODENOSCOPY (EGD) N/A 12/6/2017    [facility]formed by Dallas Lock Jr., [facility] at [facility] ENDO    VURIHSRU-HXEMZAYF-JEAQOV END Right 7/9/2014    Performed by Kendall [facility]antoja [facility] at [facility] OR    Fistulogram Right 2/11/2019    [facility]formed [facility]ATH [facility]    HYSTERECTOMY      ROTATOR CUFF REPAIR Right July 9, 2014    right side    Skull surgery      Aneurysm    stent placed      in vertebral artery    TOTAL REDUCTION MAMMOPLASTY      TUBAL LIGATION             Family History   Problem Relation Age of Onset    Cancer Mother 55    Breast cancer    Diabetes Mother     Hypertension Mother     Heart disease Mother     Heart attack Mother     Breast cancer Mother     Stroke Sister     Hypertension Sister     Sleep apnea Sister     No Known Problems Daughter     Diabetes Son     controlled    Bell's palsy Sister     Lupus Sister     Blindness Maternal Grandmother     Diabetes Unknown     "My entire family family has diabetes"    Stroke Maternal Aunt     Amblyopia Neg Hx     Cataracts Neg Hx     Glaucoma Neg Hx     Macular degeneration Neg Hx     Retinal detachment Neg Hx     Strabismus Neg Hx      Social History           Socioeconomic History    Marital status:      Spouse name: Not on file    Number of children: Not on file    Years of education: Not on file    Highest education level: Not on file   Social Needs    Financial resource strain: Not on file    Food insecurity - worry: Not on file    Food insecurity - inability: Not on file    Transportation needs - medical: Not on file    Transportation needs - non-medical: Not on file   Occupational History    Not on file   Tobacco Use    Smoking status: Never Smoker    Smokeless tobacco: Never Used   Substance and Sexual Activity    Alcohol use: No     Comment: occasional wine cooler     Drug use: No    Sexual activity: Yes     Partners: Male   Other " "Topics Concern    Not on file   Social History Narrative    . 2 children(Wilder and Reina). Does not work. Previously worked as a .           Current Outpatient Medications on File Prior to Visit   Medication Sig    acetaminophen (TYLENOL) 325 MG tablet Take 2 tablets (650 mg total) by mouth every 6 (six) hours as needed for Pain.    alprazolam (XANAX ORAL) Take 0.5 mg by mouth as needed (anxiety).     aspirin (ECOTRIN) 81 MG EC tablet Take 81 mg by mouth every morning.     atorvastatin (LIPITOR) 40 MG tablet TAKE 1 TABLET ONE TIME DAILY FOR CHOLESTEROL (Patient taking differently: Take 40 mg by mouth every evening. TAKE 1 TABLET ONE TIME DAILY FOR CHOLESTEROL)    BD INSULIN PEN NEEDLE UF SHORT 31 gauge x 5/16" Ndle USE TO INJECT NOVOLOG FLEXPEN BEFORE MEALS    blood sugar diagnostic (ACCU-CHEK SMARTVIEW TEST STRIP) Strp 1 strip by Misc.(Non-Drug; Combo Route) route 2 (two) times daily.    buPROPion (WELLBUTRIN XL) 150 MG TB24 tablet Take 1 tablet (150 mg total) by mouth once daily.    carvedilol (COREG) 25 MG tablet Take 1 tablet (25 mg total) by mouth 2 (two) times daily.    dantrolene (DANTRIUM) 50 MG Cap Take 1 capsule (50 mg total) by mouth 3 (three) times daily.    DULCOLAX, BISACODYL, ORAL Take by mouth as needed (constipation).    ergocalciferol (ERGOCALCIFEROL) 50,000 unit Cap 1 capsule (50,000 Units total) by Per G Tube route every 7 days. (Patient taking differently: Take 50,000 Units by mouth every 7 days. Takes on Thurs)    famotidine (PEPCID) 20 MG tablet Take 1 tablet (20 mg total) by mouth once daily.    ferrous sulfate 220 mg (44 mg iron)/5 mL solution 10ml daily for Iron/Give via PEG (Patient taking differently: Take 220 mg by mouth every morning. )    folic acid (FOLVITE) 1 MG tablet Take 1 tablet (1 mg total) by mouth once daily.    furosemide (LASIX) 40 MG tablet Take 1 tablet (40 mg total) by mouth once daily. (Patient taking differently: Take 40 mg by " mouth every morning. )    insulin glargine (LANTUS U-100 INSULIN) 100 unit/mL injection 10 units daily in the PM    levETIRAcetam (KEPPRA) 500 MG Tab Take 1 tablet (500 mg total) by mouth every 8 (eight) hours.    lidocaine-prilocaine (EMLA) cream Apply topically as needed.    polyethylene glycol 3350 (MIRALAX ORAL) Take by mouth every evening.     pregabalin (LYRICA) 25 MG capsule 1 cap in AM and 2 caps at Bedtime for muscle spasm    sevelamer carbonate (RENVELA) 800 mg Tab Take 800 mg by mouth 3 (three) times daily with meals.    sodium bicarbonate 650 MG tablet Take 2 tablets (1,300 mg total) by mouth 2 (two) times daily.    albuterol (PROVENTIL) 2.5 mg /3 mL (0.083 %) nebulizer solution Take 3 mLs (2.5 mg total) by nebulization every 6 (six) hours as needed for Wheezing. Rescue         Current Facility-Administered Medications on File Prior to Visit   Medication    onabotulinumtoxina injection 300 Units     REVIEW OF SYSTEMS:   General: negative; ENT: negative; Allergy and Immunology: negative; Hematological and Lymphatic: Negative; Endocrine: negative; Respiratory: no cough, shortness of breath, or wheezing; Cardiovascular: no chest pain or dyspnea on exertion; Gastrointestinal: no abdominal pain/back, change in bowel habits, or bloody stools; Genito-Urinary: no dysuria, trouble voiding, or hematuria; Musculoskeletal: negative   Neurological: no TIA or stroke symptoms   PHYSICAL EXAM:         Vitals:    02/21/19 1315   BP: 121/76   Pulse: 67   Temp: 98.5 °F (36.9 °C)     General appearance: Alert, well-appearing, and in no distress. Oriented to person, place, and time   Neurological: Normal speech, no focal findings noted; CN II - XII grossly intact   Musculoskeletal: Digits/nail without cyanosis/clubbing. Normal muscle strength/tone.   Neck: Supple, no significant adenopathy; thyroid is not enlarged   No carotid bruit can be auscultated   Chest: Clear to auscultation, no wheezes, rales or rhonchi,  symmetric air entry   No use of accessory muscles   Cardiac: Normal rate and regular rhythm, S1 and S2 normal; PMI non-displaced   Abdomen: Soft, nontender, nondistended, no masses or organomegaly   No rebound tenderness noted; bowel sounds normal   Pulsatile aortic mass is no palpable.   No groin adenopathy   Extremities: RUE VAG with soft thrill   Non-palpable R radial pulse (pre-op 2+ R radial pulse)   Positive Davidson's test   R hand - motor/sensitive intact   LAB RESULTS:         Lab Results   Component Value Date    K 4.2 01/15/2019    K 4.3 12/08/2018    K 4.0 08/31/2018    CREATININE 5.8 (H) 01/15/2019    CREATININE 4.2 (H) 12/08/2018    CREATININE 3.9 (H) 08/31/2018           Lab Results   Component Value Date    WBC 4.66 01/10/2019    WBC 5.03 12/08/2018    WBC 7.62 08/31/2018    HCT 45.8 01/10/2019    HCT 37.4 12/08/2018    HCT 40 12/08/2018     (L) 01/10/2019     12/08/2018     08/31/2018           Lab Results   Component Value Date    HGBA1C 8.5 (H) 01/15/2019    HGBA1C 5.7 (H) 08/31/2018    HGBA1C 5.6 08/10/2018     IMAGING:   Previous:   AVG u/s: flow vol 869 ml/min (previous 858 ml/min)   No stenosis   Previous:   + outflow stenosis w  cm/s   Vein mapping:   Right- cephalic medial forearm 4.1, basilic antecubital 6.3   Left- cpehalic vein 2.0, antecubital fossa 3.6     IMP/PLAN:   58 y.o. 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

## 2019-06-20 NOTE — Clinical Note
dry, intact, no bleeding and no hematoma. CAROLE sutured at access point to be removed next week on Monday. Gauze and tegaderm applied to site.

## 2019-06-20 NOTE — NURSING
Awake and alert.  Dressings to right upper arm remain clean dry and intact.  Spouse at bedside.  Pt and pt spouse verbalized understanding of d/c instruction.  Pt weight bearing with assistance at bedside.  Dressing to left groin remains clean dry and intact.  Off unit via wheelchair for discharge home.

## 2019-06-20 NOTE — NURSING
Received via stretcher from cath lab.  Report from KARINA Erickson.  Awake and alert.  No c/o pain or SOB.  resp even and unlabored.  Gauze and tegaderm dressings x 2 to right upper arm clean dry and intact.  No bleeding or hematoma noted.  Dr. Roman at bedside.  Venous sheath in place to left groin removed per Dr. Roman at bedside.  Pressure applied per Dr. Roman.  Gauze and tegaderm applied to left groin.  Left groin clean dry and intact.  Tolerating fluids and snacks on arrival.

## 2019-06-20 NOTE — BRIEF OP NOTE
[facility]  Brief Operative Note     SUMMARY     Surgery Date: 6/20/2019     Surgeon(s) and Role:     * Cabrera Irwin MD - Primary     * Lucy Roman MD - Fellow    Assisting Surgeon: None    Pre-op Diagnosis:  Thrombosis of arteriovenous graft, 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]    Post-op Diagnosis:  Post-Op Diagnosis Codes:     * Thrombosis of arteriovenous graft, 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]    Procedure:  1) U/S-guided access RUE AV graft/Second access for intervention.   2) RUE fistulogram  3) Percutaneous thrombolysis and mechanical thrombectomy w Possis Angiojet AVX   4) #4 over-the-wire Carmelina thrombectomy of the arterial inflow of AV graft.        5) Conscious sedation 60 min    Anesthesia: Consciious sedation    Description of the findings of the procedure: 1. Successful declot of thrombosed AV graft  2.  Right brachiocephalic vein occlusion.  No recanalization attempted as patient developed prolonged bradycardia following thrombectomy to HR 30s    Findings/Key Components: as above    Estimated Blood Loss: 20 cc         Specimens:   Specimen (12h ago, onward)    None          Discharge Note    SUMMARY     Admit Date: 6/20/2019    Discharge Date and Time:  06/20/2019 3:54 PM    Hospital Course (synopsis of major diagnoses, care, treatment, and services provided during the course of the hospital stay): Discharged home after outpatient procedure     Final Diagnosis: Post-Op Diagnosis Codes:     * Thrombosis of arteriovenous graft, 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]    Disposition: Home or Self Care    Follow Up/Patient Instructions:     Medications:  Reconciled Home Medications:      Medication List      CHANGE how you take these medications    ergocalciferol 50,000 unit Cap  Commonly known as:  ERGOCALCIFEROL  1 capsule (50,000 Units total) by Per G Tube route every 7 days.  What changed:    · how to take this  · additional instructions     ferrous sulfate 220 mg (44 mg iron)/5 mL  "solution  10ml daily for Iron/Give via PEG  What changed:    · how much to take  · how to take this  · when to take this  · additional instructions     furosemide 40 MG tablet  Commonly known as:  LASIX  Take 1 tablet (40 mg total) by mouth once daily.  What changed:  when to take this        CONTINUE taking these medications    acetaminophen 325 MG tablet  Commonly known as:  TYLENOL  Take 2 tablets (650 mg total) by mouth every 6 (six) hours as needed for Pain.     albuterol 2.5 mg /3 mL (0.083 %) nebulizer solution  Commonly known as:  PROVENTIL  Take 3 mLs (2.5 mg total) by nebulization every 6 (six) hours as needed for Wheezing. Rescue     ALPRAZolam 0.5 MG tablet  Commonly known as:  XANAX  1 tab Every 8 hours as needed for anxiety     aspirin 81 MG EC tablet  Commonly known as:  ECOTRIN  Take 81 mg by mouth every morning.     atorvastatin 40 MG tablet  Commonly known as:  LIPITOR  TAKE 1 TABLET ONE TIME DAILY FOR CHOLESTEROL     BD ULTRA-FINE SHORT PEN NEEDLE 31 gauge x 5/16" Ndle  Generic drug:  pen needle, diabetic  USE TO INJECT NOVOLOG FLEXPEN BEFORE MEALS     blood sugar diagnostic Strp  Commonly known as:  ACCU-CHEK SMARTVIEW TEST STRIP  1 strip by Misc.(Non-Drug; Combo Route) route 2 (two) times daily.     buPROPion 300 MG 24 hr tablet  Commonly known as:  WELLBUTRIN XL  Take 1 tablet (300 mg total) by mouth once daily.     carvedilol 25 MG tablet  Commonly known as:  COREG  Take 1 tablet (25 mg total) by mouth 2 (two) times daily.     dantrolene 50 MG Cap  Commonly known as:  DANTRIUM  Take 1 capsule (50 mg total) by mouth 3 (three) times daily.     DULCOLAX (BISACODYL) ORAL  Take by mouth as needed (constipation).     famotidine 20 MG tablet  Commonly known as:  PEPCID  Take 1 tablet (20 mg total) by mouth once daily.     flash glucose scanning reader Misc  Commonly known as:  FREESTYLE JOSÉ LUIS 14 DAY READER  Use as directed. Scan 4 times a day.     flash glucose sensor Kit  Commonly known as:  " FREESTYLE JOSÉ LUIS 14 DAY SENSOR  Change every 14 days.     folic acid 1 MG tablet  Commonly known as:  FOLVITE  Take 1 tablet (1 mg total) by mouth once daily.     * insulin glargine 100 unit/mL injection  Commonly known as:  LANTUS U-100 INSULIN  10 units daily in the PM     * insulin glargine 100 units/mL (3mL) SubQ pen  Commonly known as:  LANTUS SOLOSTAR U-100 INSULIN  12 units Subcutaneously at Bedtime     levETIRAcetam 500 MG Tab  Commonly known as:  KEPPRA  Take 1 tablet (500 mg total) by mouth every 8 (eight) hours.     lidocaine-prilocaine cream  Commonly known as:  EMLA  Apply topically as needed.     MIRALAX ORAL  Take by mouth every evening.     pregabalin 25 MG capsule  Commonly known as:  LYRICA  1 cap in AM and 2 caps at Bedtime for muscle spasm     sevelamer carbonate 800 mg Tab  Commonly known as:  RENVELA  Take 800 mg by mouth 3 (three) times daily with meals.     sodium bicarbonate 650 MG tablet  Take 2 tablets (1,300 mg total) by mouth 2 (two) times daily.     traMADol 50 mg tablet  Commonly known as:  ULTRAM  1 tab Q6-8 hours as needed for joint pain         * This list has 2 medication(s) that are the same as other medications prescribed for you. Read the directions carefully, and ask your doctor or other care provider to review them with you.              Discharge Procedure Orders   Diet general     Remove dressing in 24 hours     REASON FOR NOT PRESCRIBING ANTIPLATELET MEDICATION AT DISCHARGE     Order Specific Question Answer Comments   Reason for not Prescribing: Other (Comment)      REASON FOR NOT PRESCRIBING STATIN MEDICATION AT DISCHARGE     Activity as tolerated     Follow-up Information     Meir Valencia MD In 1 week.    Specialty:  Vascular Surgery  Why:  with R AVG duplex and BUE vein mapping  Contact information:  [id number] [facility] [id number] [phone]

## 2019-06-20 NOTE — OP NOTE
6/20/2019      Pre-operative Diagnosis:    Thrombosis of arteriovenous graft, 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]    Post-operative Diagnosis:   same.    Procedures:  1) U/S-guided access RUE AV graft/Second access for intervention.   2) RUE fistulogram  3) Percutaneous thrombolysis and mechanical thrombectomy w Possis Angiojet AVX   4) #4 over-the-wire Carmelina thrombectomy of the arterial inflow of AV graft.        5) Conscious sedation 60 min    Surgeon: Cabrera Irwin MD     Assistants: Lucy Roman MD    Anesthesia: RN IV sedation    Findings/Key elements:   1. Successful declot of thrombosed AV graft  2.  Right brachiocephalic vein occlusion.  No recanalization attempted as patient developed prolonged bradycardia following thrombectomy to HR 30s           Procedure Details:  The patient was seen in the Holding Room. The risks, benefits, complications, treatment options, and expected outcomes were discussed with the patient. The patient concurred with the proposed plan, giving informed consent.  The site of surgery properly noted/marked.   Patient was brought to the cath lab and positioned supine on the table. RN IV sedationwas administered by anesthesia team. Patient's R arm was prepped and draped in usual sterile fashion. A Time Out was held and the above information confirmed.    After an informed consent was obtained, the patient was brought to the Cath Lab and placed in the supine psition. The RUE extremity was prepped and draped in the standard surgical fashion. The area overlying the planned access site was anesthetized using 1% lidocaine.  The distal aspect of the RUE AVG (by the arterial anastomosis) was accessed in the Venous facing direction with a micropuncture needle and wire, with confirmation of placement with fluoroscopy. This was followed by the micropuncture sheath. The micropuncture wire was exchanged for a J wire and the micropuncture sheath was exchanged for a 6 Albanian short sheath. A 0.035 in  Glidewire with Glide cathteer was used to advance wire into the subclavian vein.  The patient was anticoagulated with IV heparin. Next the Possis angioget catheter was used in pulse spray mode to instill tPA throughout entire length of graft.      Next attention turned to arterial facing direction of the R AVG. The area overlying the planned access site was anesthetized using 1% lidocaine. Using ultrasound, the distal aspect of the R AVG was accessed in an Arterial facing direction with a micropuncture needle and wire, with placement confirmed by fluoroscopy, followed by the micropuncture sheath. The micropuncture wire was exchanged for a J wire and the micropuncture sheath was exchanged for a 6 Grenadian short sheath. The J wire was exchanged for an 0.018 Advantage wire and with the assistance of an angled glide catheter the wire was threaded through the thrombosed AV graft and into the brachial artery. An #4 over the wire darci was placed through the arterial facing sheath over the wire and carefully inflated while drawing back toward the sheath to perform thrombectomy of the arterial facing end of the AV graft . This step was repeated three times til all thrombus was felt to be removed.   Next the Possis Angiojet AVX catheter was placed through the venous facing sheath and the clot was aspirated with percutaneous mechanical thrombectomy. This was repeated several times to ensure adequate thrombectomy.  Follow up fistulogram demonstrated minimal residual thrombus in the graft, with brisk washout. Central venogram revealed an occluded brachiocephalic vein. However at this time the patient be came significantly bradycardic, down to HR 30s. Despite waiting several minutes, it improved only to 40s.  At this point the procedure was stopped.  Palpable thrill was felt in RUE AVG.  Two U stitches of 3-0 Monocryl were placed around sheaths and sheaths were removed.  Pressure held on access sites for 5 minutes with good  hemostasis.  Sterile dressing applied and patient taken to PACU in stable condition.     Dr Irwin was present for the procedure.    EBL:  20 mL           Complications:  None; patient tolerated the procedure well.           Disposition: Stable to recovery.    MODERATE SEDATION IN CATH LAB   Cabrera Irwin MD was present for moderate sedation  Cabrera Irwin MD monitored the patients cardio respiratory functions during the moderate sedation   See nurses notes for Intra-service start and end times and for the log of the name of the RN who administered the medicines for the moderate sedation, including their doseage and route.    Time of sedation:  60 mins.    Lucy Roman MD   Fellow, Vascular/Endovascular Surgery

## 2019-06-20 NOTE — Clinical Note
15 ml injected throughout the case. 85 mL total wasted during the case. 100 mL total used in the case.

## 2019-06-21 ENCOUNTER — HOSPITAL ENCOUNTER (OUTPATIENT)
Facility: HOSPITAL | Age: 59
Discharge: HOME | End: 2019-06-23
Attending: EMERGENCY MEDICINE | Admitting: INTERNAL MEDICINE
Payer: MEDICARE

## 2019-06-21 ENCOUNTER — NURSE TRIAGE (OUTPATIENT)
Dept: ADMINISTRATIVE | Facility: CLINIC | Age: 59
End: 2019-06-21

## 2019-06-21 DIAGNOSIS — Z79.4 TYPE 2 DIABETES MELLITUS WITH CHRONIC KIDNEY DISEASE ON CHRONIC DIALYSIS, WITH LONG-TERM CURRENT USE OF INSULIN: ICD-10-CM

## 2019-06-21 DIAGNOSIS — N18.6 CONTROLLED TYPE 2 DIABETES MELLITUS WITH CHRONIC KIDNEY DISEASE ON CHRONIC DIALYSIS, WITHOUT LONG-TERM CURRENT USE OF INSULIN: ICD-10-CM

## 2019-06-21 DIAGNOSIS — Z87.448 HISTORY OF END STAGE RENAL DISEASE: ICD-10-CM

## 2019-06-21 DIAGNOSIS — T82.898D PROBLEM WITH DIALYSIS ACCESS, SUBSEQUENT ENCOUNTER: ICD-10-CM

## 2019-06-21 DIAGNOSIS — R31.9 HEMATURIA, UNSPECIFIED TYPE: ICD-10-CM

## 2019-06-21 DIAGNOSIS — Z99.2: ICD-10-CM

## 2019-06-21 DIAGNOSIS — Z99.2 TYPE 2 DIABETES MELLITUS WITH CHRONIC KIDNEY DISEASE ON CHRONIC DIALYSIS, WITH LONG-TERM CURRENT USE OF INSULIN: ICD-10-CM

## 2019-06-21 DIAGNOSIS — I16.0 HYPERTENSIVE URGENCY: ICD-10-CM

## 2019-06-21 DIAGNOSIS — Z99.2 CONTROLLED TYPE 2 DIABETES MELLITUS WITH CHRONIC KIDNEY DISEASE ON CHRONIC DIALYSIS, WITHOUT LONG-TERM CURRENT USE OF INSULIN: ICD-10-CM

## 2019-06-21 DIAGNOSIS — E11.22 TYPE 2 DIABETES MELLITUS WITH CHRONIC KIDNEY DISEASE ON CHRONIC DIALYSIS, WITH LONG-TERM CURRENT USE OF INSULIN: ICD-10-CM

## 2019-06-21 DIAGNOSIS — E87.5 HYPERKALEMIA: Primary | ICD-10-CM

## 2019-06-21 DIAGNOSIS — N18.6 ESRD (END STAGE RENAL DISEASE) ON DIALYSIS: ICD-10-CM

## 2019-06-21 DIAGNOSIS — E11.22 CONTROLLED TYPE 2 DIABETES MELLITUS WITH CHRONIC KIDNEY DISEASE ON CHRONIC DIALYSIS, WITHOUT LONG-TERM CURRENT USE OF INSULIN: ICD-10-CM

## 2019-06-21 DIAGNOSIS — M79.89 SWELLING OF RIGHT UPPER EXTREMITY: ICD-10-CM

## 2019-06-21 DIAGNOSIS — Z99.2 ESRD (END STAGE RENAL DISEASE) ON DIALYSIS: ICD-10-CM

## 2019-06-21 DIAGNOSIS — I61.9 HEMORRHAGIC CEREBROVASCULAR ACCIDENT (CVA): ICD-10-CM

## 2019-06-21 DIAGNOSIS — R31.9 HEMATURIA: ICD-10-CM

## 2019-06-21 DIAGNOSIS — N18.6 TYPE 2 DIABETES MELLITUS WITH CHRONIC KIDNEY DISEASE ON CHRONIC DIALYSIS, WITH LONG-TERM CURRENT USE OF INSULIN: ICD-10-CM

## 2019-06-21 DIAGNOSIS — Z86.39 H/O HYPERKALEMIA: ICD-10-CM

## 2019-06-21 DIAGNOSIS — I10 ESSENTIAL HYPERTENSION: ICD-10-CM

## 2019-06-21 LAB
ALBUMIN SERPL BCP-MCNC: 3.5 G/DL (ref 3.5–5.2)
ALP SERPL-CCNC: 89 U/L (ref 55–135)
ALT SERPL W/O P-5'-P-CCNC: 16 U/L (ref 10–44)
ANION GAP SERPL CALC-SCNC: 15 MMOL/L (ref 8–16)
ANION GAP SERPL CALC-SCNC: 16 MMOL/L (ref 8–16)
AST SERPL-CCNC: 107 U/L (ref 10–40)
BACTERIA #/AREA URNS AUTO: NORMAL /HPF
BASOPHILS # BLD AUTO: 0.04 K/UL (ref 0–0.2)
BASOPHILS NFR BLD: 0.4 % (ref 0–1.9)
BILIRUB SERPL-MCNC: 1.6 MG/DL (ref 0.1–1)
BILIRUB UR QL STRIP: NEGATIVE
BUN SERPL-MCNC: 78 MG/DL (ref 6–20)
BUN SERPL-MCNC: 90 MG/DL (ref 6–20)
CALCIUM SERPL-MCNC: 9.1 MG/DL (ref 8.7–10.5)
CALCIUM SERPL-MCNC: 9.9 MG/DL (ref 8.7–10.5)
CHLORIDE SERPL-SCNC: 100 MMOL/L (ref 95–110)
CHLORIDE SERPL-SCNC: 96 MMOL/L (ref 95–110)
CLARITY UR REFRACT.AUTO: CLEAR
CO2 SERPL-SCNC: 22 MMOL/L (ref 23–29)
CO2 SERPL-SCNC: 23 MMOL/L (ref 23–29)
COLOR UR AUTO: ABNORMAL
CREAT SERPL-MCNC: 6.8 MG/DL (ref 0.5–1.4)
CREAT SERPL-MCNC: 9.6 MG/DL (ref 0.5–1.4)
DIFFERENTIAL METHOD: ABNORMAL
EOSINOPHIL # BLD AUTO: 0.1 K/UL (ref 0–0.5)
EOSINOPHIL NFR BLD: 1.1 % (ref 0–8)
ERYTHROCYTE [DISTWIDTH] IN BLOOD BY AUTOMATED COUNT: 13.2 % (ref 11.5–14.5)
EST. GFR  (AFRICAN AMERICAN): 4.6 ML/MIN/1.73 M^2
EST. GFR  (AFRICAN AMERICAN): 7 ML/MIN/1.73 M^2
EST. GFR  (NON AFRICAN AMERICAN): 4 ML/MIN/1.73 M^2
EST. GFR  (NON AFRICAN AMERICAN): 6 ML/MIN/1.73 M^2
GLUCOSE SERPL-MCNC: 232 MG/DL (ref 70–110)
GLUCOSE SERPL-MCNC: 241 MG/DL (ref 70–110)
GLUCOSE UR QL STRIP: ABNORMAL
HCT VFR BLD AUTO: 37.8 % (ref 37–48.5)
HGB BLD-MCNC: 11.9 G/DL (ref 12–16)
HGB UR QL STRIP: ABNORMAL
HYALINE CASTS UR QL AUTO: 0 /LPF
IMM GRANULOCYTES # BLD AUTO: 0.05 K/UL (ref 0–0.04)
IMM GRANULOCYTES NFR BLD AUTO: 0.5 % (ref 0–0.5)
KETONES UR QL STRIP: NEGATIVE
LEUKOCYTE ESTERASE UR QL STRIP: NEGATIVE
LYMPHOCYTES # BLD AUTO: 0.3 K/UL (ref 1–4.8)
LYMPHOCYTES NFR BLD: 3.5 % (ref 18–48)
MCH RBC QN AUTO: 33.2 PG (ref 27–31)
MCHC RBC AUTO-ENTMCNC: 31.5 G/DL (ref 32–36)
MCV RBC AUTO: 106 FL (ref 82–98)
MICROSCOPIC COMMENT: NORMAL
MONOCYTES # BLD AUTO: 0.7 K/UL (ref 0.3–1)
MONOCYTES NFR BLD: 7.4 % (ref 4–15)
NEUTROPHILS # BLD AUTO: 8.5 K/UL (ref 1.8–7.7)
NEUTROPHILS NFR BLD: 87.1 % (ref 38–73)
NITRITE UR QL STRIP: NEGATIVE
NRBC BLD-RTO: 0 /100 WBC
PH UR STRIP: >8 [PH] (ref 5–8)
PLATELET # BLD AUTO: 197 K/UL (ref 150–350)
PMV BLD AUTO: 10.4 FL (ref 9.2–12.9)
POCT GLUCOSE: 250 MG/DL (ref 70–110)
POTASSIUM SERPL-SCNC: 4.7 MMOL/L (ref 3.5–5.1)
POTASSIUM SERPL-SCNC: 5.9 MMOL/L (ref 3.5–5.1)
POTASSIUM SERPL-SCNC: 6.1 MMOL/L (ref 3.5–5.1)
PROT SERPL-MCNC: 8.3 G/DL (ref 6–8.4)
PROT UR QL STRIP: ABNORMAL
RBC # BLD AUTO: 3.58 M/UL (ref 4–5.4)
RBC #/AREA URNS AUTO: 2 /HPF (ref 0–4)
SODIUM SERPL-SCNC: 135 MMOL/L (ref 136–145)
SODIUM SERPL-SCNC: 137 MMOL/L (ref 136–145)
SP GR UR STRIP: 1.01 (ref 1–1.03)
SQUAMOUS #/AREA URNS AUTO: 2 /HPF
URN SPEC COLLECT METH UR: ABNORMAL
WBC # BLD AUTO: 9.71 K/UL (ref 3.9–12.7)
WBC #/AREA URNS AUTO: 2 /HPF (ref 0–5)
YEAST UR QL AUTO: NORMAL

## 2019-06-21 PROCEDURE — G0257 UNSCHED DIALYSIS ESRD PT HOS: HCPCS | Mod: HCNC,NTX

## 2019-06-21 PROCEDURE — 25000003 PHARM REV CODE 250: Mod: HCNC,NTX | Performed by: EMERGENCY MEDICINE

## 2019-06-21 PROCEDURE — 80048 BASIC METABOLIC PNL TOTAL CA: CPT | Mod: HCNC,NTX

## 2019-06-21 PROCEDURE — 93010 EKG 12-LEAD: ICD-10-PCS | Mod: HCNC,NTX,, | Performed by: INTERNAL MEDICINE

## 2019-06-21 PROCEDURE — G0378 HOSPITAL OBSERVATION PER HR: HCPCS | Mod: HCNC,NTX

## 2019-06-21 PROCEDURE — 93990 DOPPLER FLOW TESTING: CPT | Mod: HCNC,NTX | Performed by: SURGERY

## 2019-06-21 PROCEDURE — 25000003 PHARM REV CODE 250: Mod: HCNC,NTX | Performed by: NURSE PRACTITIONER

## 2019-06-21 PROCEDURE — 85025 COMPLETE CBC W/AUTO DIFF WBC: CPT | Mod: HCNC,NTX

## 2019-06-21 PROCEDURE — 99285 EMERGENCY DEPT VISIT HI MDM: CPT | Mod: HCNC,NTX,, | Performed by: EMERGENCY MEDICINE

## 2019-06-21 PROCEDURE — 63600175 PHARM REV CODE 636 W HCPCS: Mod: HCNC,NTX | Performed by: PHYSICIAN ASSISTANT

## 2019-06-21 PROCEDURE — 81001 URINALYSIS AUTO W/SCOPE: CPT | Mod: HCNC,NTX

## 2019-06-21 PROCEDURE — 99220 PR INITIAL OBSERVATION CARE,LEVL III: ICD-10-PCS | Mod: HCNC,NTX,, | Performed by: PHYSICIAN ASSISTANT

## 2019-06-21 PROCEDURE — 99285 EMERGENCY DEPT VISIT HI MDM: CPT | Mod: HCNC,NTX

## 2019-06-21 PROCEDURE — 25000003 PHARM REV CODE 250: Mod: HCNC,NTX | Performed by: PHYSICIAN ASSISTANT

## 2019-06-21 PROCEDURE — 36415 COLL VENOUS BLD VENIPUNCTURE: CPT | Mod: HCNC,NTX

## 2019-06-21 PROCEDURE — 99220 PR INITIAL OBSERVATION CARE,LEVL III: CPT | Mod: HCNC,NTX,, | Performed by: PHYSICIAN ASSISTANT

## 2019-06-21 PROCEDURE — 93005 ELECTROCARDIOGRAM TRACING: CPT | Mod: HCNC,NTX

## 2019-06-21 PROCEDURE — 80053 COMPREHEN METABOLIC PANEL: CPT | Mod: HCNC,NTX

## 2019-06-21 PROCEDURE — 99285 PR EMERGENCY DEPT VISIT,LEVEL V: ICD-10-PCS | Mod: HCNC,NTX,, | Performed by: EMERGENCY MEDICINE

## 2019-06-21 PROCEDURE — 84132 ASSAY OF SERUM POTASSIUM: CPT | Mod: HCNC,NTX,91

## 2019-06-21 PROCEDURE — S5571 INSULIN DISPOS PEN 3 ML: HCPCS | Mod: HCNC,NTX | Performed by: PHYSICIAN ASSISTANT

## 2019-06-21 PROCEDURE — 99283 PR EMERGENCY DEPT VISIT,LEVEL III: ICD-10-PCS | Mod: HCNC,NTX,, | Performed by: SURGERY

## 2019-06-21 PROCEDURE — 99283 EMERGENCY DEPT VISIT LOW MDM: CPT | Mod: HCNC,NTX,, | Performed by: SURGERY

## 2019-06-21 PROCEDURE — 93010 ELECTROCARDIOGRAM REPORT: CPT | Mod: HCNC,NTX,, | Performed by: INTERNAL MEDICINE

## 2019-06-21 RX ORDER — FOLIC ACID 1 MG/1
1 TABLET ORAL DAILY
Status: DISCONTINUED | OUTPATIENT
Start: 2019-06-22 | End: 2019-06-23 | Stop reason: HOSPADM

## 2019-06-21 RX ORDER — IBUPROFEN 200 MG
24 TABLET ORAL
Status: DISCONTINUED | OUTPATIENT
Start: 2019-06-22 | End: 2019-06-23 | Stop reason: HOSPADM

## 2019-06-21 RX ORDER — LORAZEPAM 0.5 MG/1
0.5 TABLET ORAL
Status: COMPLETED | OUTPATIENT
Start: 2019-06-21 | End: 2019-06-21

## 2019-06-21 RX ORDER — SODIUM CHLORIDE 0.9 % (FLUSH) 0.9 %
10 SYRINGE (ML) INJECTION
Status: DISCONTINUED | OUTPATIENT
Start: 2019-06-21 | End: 2019-06-23 | Stop reason: HOSPADM

## 2019-06-21 RX ORDER — PREGABALIN 50 MG/1
50 CAPSULE ORAL NIGHTLY
Status: DISCONTINUED | OUTPATIENT
Start: 2019-06-21 | End: 2019-06-23 | Stop reason: HOSPADM

## 2019-06-21 RX ORDER — CARVEDILOL 25 MG/1
25 TABLET ORAL 2 TIMES DAILY
Status: DISCONTINUED | OUTPATIENT
Start: 2019-06-21 | End: 2019-06-23 | Stop reason: HOSPADM

## 2019-06-21 RX ORDER — LEVETIRACETAM 500 MG/1
500 TABLET ORAL 2 TIMES DAILY
Status: DISCONTINUED | OUTPATIENT
Start: 2019-06-21 | End: 2019-06-23 | Stop reason: HOSPADM

## 2019-06-21 RX ORDER — ONDANSETRON 8 MG/1
8 TABLET, ORALLY DISINTEGRATING ORAL EVERY 8 HOURS PRN
Status: DISCONTINUED | OUTPATIENT
Start: 2019-06-21 | End: 2019-06-23 | Stop reason: HOSPADM

## 2019-06-21 RX ORDER — BUPROPION HYDROCHLORIDE 300 MG/1
300 TABLET ORAL DAILY
Status: DISCONTINUED | OUTPATIENT
Start: 2019-06-22 | End: 2019-06-23 | Stop reason: HOSPADM

## 2019-06-21 RX ORDER — GLUCAGON 1 MG
1 KIT INJECTION
Status: DISCONTINUED | OUTPATIENT
Start: 2019-06-22 | End: 2019-06-23 | Stop reason: HOSPADM

## 2019-06-21 RX ORDER — TRAMADOL HYDROCHLORIDE 50 MG/1
50 TABLET ORAL EVERY 8 HOURS PRN
Status: DISCONTINUED | OUTPATIENT
Start: 2019-06-21 | End: 2019-06-23 | Stop reason: HOSPADM

## 2019-06-21 RX ORDER — BUTALBITAL, ACETAMINOPHEN AND CAFFEINE 50; 325; 40 MG/1; MG/1; MG/1
2 TABLET ORAL
Status: COMPLETED | OUTPATIENT
Start: 2019-06-21 | End: 2019-06-21

## 2019-06-21 RX ORDER — FUROSEMIDE 40 MG/1
40 TABLET ORAL DAILY
Status: DISCONTINUED | OUTPATIENT
Start: 2019-06-22 | End: 2019-06-23 | Stop reason: HOSPADM

## 2019-06-21 RX ORDER — SODIUM CHLORIDE 9 MG/ML
INJECTION, SOLUTION INTRAVENOUS
Status: DISCONTINUED | OUTPATIENT
Start: 2019-06-21 | End: 2019-06-23 | Stop reason: HOSPADM

## 2019-06-21 RX ORDER — SODIUM CHLORIDE 9 MG/ML
INJECTION, SOLUTION INTRAVENOUS ONCE
Status: COMPLETED | OUTPATIENT
Start: 2019-06-21 | End: 2019-06-21

## 2019-06-21 RX ORDER — FAMOTIDINE 20 MG/1
20 TABLET, FILM COATED ORAL DAILY
Status: DISCONTINUED | OUTPATIENT
Start: 2019-06-22 | End: 2019-06-23 | Stop reason: HOSPADM

## 2019-06-21 RX ORDER — IBUPROFEN 200 MG
16 TABLET ORAL
Status: DISCONTINUED | OUTPATIENT
Start: 2019-06-22 | End: 2019-06-23 | Stop reason: HOSPADM

## 2019-06-21 RX ORDER — SODIUM BICARBONATE 650 MG/1
1300 TABLET ORAL 2 TIMES DAILY
Status: DISCONTINUED | OUTPATIENT
Start: 2019-06-22 | End: 2019-06-23 | Stop reason: HOSPADM

## 2019-06-21 RX ORDER — SEVELAMER CARBONATE 800 MG/1
800 TABLET, FILM COATED ORAL
Status: DISCONTINUED | OUTPATIENT
Start: 2019-06-22 | End: 2019-06-23 | Stop reason: HOSPADM

## 2019-06-21 RX ORDER — INSULIN ASPART 100 [IU]/ML
0-5 INJECTION, SOLUTION INTRAVENOUS; SUBCUTANEOUS
Status: DISCONTINUED | OUTPATIENT
Start: 2019-06-22 | End: 2019-06-23 | Stop reason: HOSPADM

## 2019-06-21 RX ORDER — ACETAMINOPHEN 325 MG/1
650 TABLET ORAL EVERY 4 HOURS PRN
Status: DISCONTINUED | OUTPATIENT
Start: 2019-06-21 | End: 2019-06-23 | Stop reason: HOSPADM

## 2019-06-21 RX ORDER — ASPIRIN 81 MG/1
81 TABLET ORAL EVERY MORNING
Status: DISCONTINUED | OUTPATIENT
Start: 2019-06-22 | End: 2019-06-23

## 2019-06-21 RX ORDER — SYRING-NEEDL,DISP,INSUL,0.3 ML 29 G X1/2"
296 SYRINGE, EMPTY DISPOSABLE MISCELLANEOUS ONCE
Status: COMPLETED | OUTPATIENT
Start: 2019-06-21 | End: 2019-06-22

## 2019-06-21 RX ORDER — ATORVASTATIN CALCIUM 20 MG/1
40 TABLET, FILM COATED ORAL DAILY
Status: DISCONTINUED | OUTPATIENT
Start: 2019-06-22 | End: 2019-06-23 | Stop reason: HOSPADM

## 2019-06-21 RX ADMIN — ACETAMINOPHEN 650 MG: 325 TABLET ORAL at 08:06

## 2019-06-21 RX ADMIN — SODIUM CHLORIDE: 0.9 INJECTION, SOLUTION INTRAVENOUS at 04:06

## 2019-06-21 RX ADMIN — CARVEDILOL 25 MG: 25 TABLET, FILM COATED ORAL at 11:06

## 2019-06-21 RX ADMIN — LEVETIRACETAM 500 MG: 500 TABLET ORAL at 11:06

## 2019-06-21 RX ADMIN — PREGABALIN 50 MG: 50 CAPSULE ORAL at 11:06

## 2019-06-21 RX ADMIN — INSULIN DETEMIR 10 UNITS: 100 INJECTION, SOLUTION SUBCUTANEOUS at 11:06

## 2019-06-21 RX ADMIN — BUTALBITAL, ACETAMINOPHEN AND CAFFEINE 2 TABLET: 50; 325; 40 TABLET ORAL at 09:06

## 2019-06-21 RX ADMIN — LORAZEPAM 0.5 MG: 0.5 TABLET ORAL at 09:06

## 2019-06-21 RX ADMIN — INSULIN ASPART 1 UNITS: 100 INJECTION, SOLUTION INTRAVENOUS; SUBCUTANEOUS at 11:06

## 2019-06-21 NOTE — ED NOTES
Patient is taking her home meds per MD Rivera to bring her BP down. Pt and family agree that she does not need blood work since labs were obtained yesterday. Pt and family also agree with MD that she needs to take her home meds to prevent further seizures and lower her BP. MD Rivera and I performed gentourinary assessment. No bleeding noted. Urethra looked pink with no bleeding.

## 2019-06-21 NOTE — HPI
Ms Perez is a 57yo female with a past medical history history of ESRD on HD MWF, HTN, HLD, L hemiplegia after large stroke (2016), T2DM who underwent a declot of her RUE AVG yesterday and now presents to the [facility] ED with hematuria and concern for seizure this morning. She was last dialyzed on Monday. Nephrology has been consulted for management of ESRD and HD while inpatient.

## 2019-06-21 NOTE — TELEPHONE ENCOUNTER
Reason for Disposition   No answer.  First attempt to contact caller.  Follow-up call scheduled within 15 minutes.    Protocols used: NO CONTACT OR DUPLICATE CONTACT CALL-A-OH    No answer x 2 attempts to call patient back.  No contact, no triage.  Lucy is kidney transplant referral since 03/25/19, and on dialysis, per chart note.  Was hospitalized yesterday for thrombectomy of AV graft.  Not able to speak to patient, so do not know the nature of or reason for her call to triage line.  Message sent to pre-kidney transplant team, Leopoldo Romano RN coordinator, for follow up.  Please contact caller directly with any additional care advice.

## 2019-06-21 NOTE — PROGRESS NOTES
Consult acknowledged. Chart reviewed. Will plan for HD today. Repeat K+. Full note and recommendations to follow.

## 2019-06-21 NOTE — ED TRIAGE NOTES
Pt reports hematuria after getting dialysis port declotted yesterday. Pt is very weak today. PT family reports seizure this morning around 7am. Family states patient was shaking and jerking. Seizure lasted 1-2 minutes witnessed. NO injury or LOC.

## 2019-06-21 NOTE — ED NOTES
Pt voided in the bed. Sample obtained. Pure wick placed on patient. MD Rivera at bedside. Hematuria noted.

## 2019-06-21 NOTE — PLAN OF CARE
Problem: Adult Inpatient Plan of Care  Goal: Plan of Care Review  Outcome: Ongoing (interventions implemented as appropriate)  Hemodialysis tx complete 800ml removed in a tx of 2 hours, tolerated well. Blood returned via CAROLE AVG, 15g needles removed x2, gauze and tape applied, pressure held to each site for 5 minutes, hemostasis achieved

## 2019-06-21 NOTE — PROGRESS NOTES
Maintenance hemodialysis tx started via CAROLE AVG, tolerated well, flows good. Dialysis days are MWF

## 2019-06-21 NOTE — ED PROVIDER NOTES
Encounter Date: 6/21/2019    SCRIBE #1 NOTE: I, Gayla Quiroz, am scribing for, and in the presence of,  Dr. Rivera. I have scribed the following portions of the note -       History     Chief Complaint   Patient presents with    Seizures     7 am    Hematuria     Time patient was seen by the provider: 8:49 AM      The patient is a 58 y.o. female with co-morbidities including: *** who presents to the ED with a complaint of       Went to vascular team and unclotted   had problem with getting iv   Had blood urine   Av great was clotted   Hd sz this monring   Did not take medications due to fasting   Shaking lasted 1 minute  She feels drained no numbness or weakness           Review of patient's allergies indicates:   Allergen Reactions    Lisinopril Other (See Comments)     Angioedema      Vicodin [hydrocodone-acetaminophen] Rash     No problem with acetaminophen      Past Medical History:   Diagnosis Date    Anemia of chronic disease 6/10/2017    Anxiety     Arthritis of right acromioclavicular joint 7/2/2014    Asthma     Bipolar disorder     Bronchitis, acute     Cataract     Cholelithiasis     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    Cortical cataract of both eyes 7/26/2016    Decubitus ulcer of buttock, stage 2     Degeneration of lumbar or lumbosacral intervertebral disc 3/5/2013    S/p MRI L-spine 5/2009     Depression     ESRD on hemodialysis     Started August 2018    General anesthetics causing adverse effect in therapeutic use     Hemorrhagic cerebrovascular accident (CVA)     8/2016 s/p Hemicraniotomy at [facility] with Left hemiparesis    Hemorrhagic cerebrovascular accident (CVA) 1/3/2018    History of stroke 6/28/2017    s/p R-MCA stroke with R-putaminal hemorrhagic transformation in 8/2016 and 11/2016 (s/p hemicraniotomy at [facility]) with residual L hemiparesis, on AED s/p CVA      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    Hyperlipidemia     Hypertensive retinopathy  of both eyes 7/26/2016    Impingement syndrome of right shoulder 7/2/2014    Left ventricular diastolic dysfunction with preserved systolic function 12/15/2015    Mixed anxiety and depressive disorder 3/5/2013    Obesity     THERESA (obstructive sleep apnea) 3/5/2013    No Home CPAP 2ndary to cost     Partial symptomatic epilepsy with complex partial seizures, not intractable, without status epilepticus     PEG (percutaneous endoscopic gastrostomy) status 6/28/2017    Renovascular hypertension 3/5/2013    Will resume home medications: amlodipine, labetalol, and hydralazine Will hold Lisinopril in setting of DARLING.   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 without macular edema, without long-term current use of insulin 8/2/2017    Type 2 diabetes mellitus with diabetic polyneuropathy, without long-term current use of insulin 10/22/2015    Type 2 diabetes mellitus with stage 3 chronic kidney disease, without long-term current use of insulin 10/22/2015    Vertebral artery stenosis 3/5/2013    S/p Stenting per Dr Burnett      Past Surgical History:   Procedure Laterality Date    ARTHROSCOPY-SHOULDER WITH SUBACROMIAL DECOMPRESSION Right 7/9/2014    Performed [facility]all [facility] at [facility]    AV GRAFT CREATION Right 10/18/2018    Performed by Meir Valencia [facility] at [facility] OR 2ND FLR    Biceps Tenotomy Right 7/9/2014    [facility] by [facility] at [facility] OR    BREAST SURGERY      breast reduction    COLONOSCOPY N/A 8/11/2016    Performed by Jerry Vilchis MD at [facility] ENDO (4TH FLR)    DEBRIDEMENT-SHOULDER-ARTHROSCOPIC Labral Cuff Right 7/9/2014    Performed by Kendall  "PETRA Pantoja MD at [facility] OR    ESOPHAGOGASTRODUODENOSCOPY (EGD) N/A 12/6/2017    Performed by Dallas Lock Jr., [facility] at [facility] ENDO    MTIHUAZS-[id number]-CIVPXX END Right 7/9/2014    Performed by Kendall [facility]antoja [facility] at [facility]    Fistulogram Right 2/11/2019    Performed by Meir Valencia MD at [facility] CATH LAB    HYSTERECTOMY      ROTATOR CUFF REPAIR Right July 9, 2014    right side    Skull surgery      Aneurysm    stent placed      in vertebral artery    TOTAL REDUCTION MAMMOPLASTY      TUBAL LIGATION       Family History   Problem Relation Age of Onset    Cancer Mother 55        Breast cancer    Diabetes Mother     Hypertension Mother     Heart disease Mother     Heart attack Mother     Breast cancer Mother     Stroke Sister     Hypertension Sister     Sleep apnea Sister     No Known Problems Daughter     Diabetes Son         controlled    Bell's palsy Sister     Lupus Sister     Blindness Maternal Grandmother     Diabetes Unknown         "My entire family family has diabetes"    Stroke Maternal Aunt     Amblyopia Neg Hx     Cataracts Neg Hx     Glaucoma Neg Hx     Macular degeneration Neg Hx     Retinal detachment Neg Hx     Strabismus Neg Hx      Social History     Tobacco Use    Smoking status: Never Smoker    Smokeless tobacco: Never Used   Substance Use Topics    Alcohol use: No     Comment: occasional wine cooler     Drug use: No     Review of Systems   Genitourinary: Positive for hematuria.   Neurological: Positive for seizures.       Physical Exam     Initial Vitals [06/21/19 0840]   BP Pulse Resp Temp SpO2   (!) 230/105 70 18 98.9 °F (37.2 °C) 96 %      MAP       --         Physical Exam    ED Course   Procedures  Labs Reviewed - No data to display       Imaging Results    None          Medical Decision Making:   History:   Old Medical Records: I decided to obtain old medical records.  Old Records Summarized: records from clinic visits and other records.        "     Scribe Attestation:   Scribe #1: I performed the above scribed service and the documentation accurately describes the services I performed. I attest to the accuracy of the note.               Clinical Impression:   No diagnosis found.

## 2019-06-21 NOTE — ED PROVIDER NOTES
Encounter Date: 6/21/2019    SCRIBE #1 NOTE: I, Gayla Quiroz, am scribing for, and in the presence of,  Dr. Rivera. I have scribed the following portions of the note - Other sections scribed: SUNSHINE FERNANDEZ.     STAFF ATTENDING PHYSICIAN NOTE:  I provided and agree with the documentation provided by AC on Lucy Perez.  ____________________  Torres STACEY Rivera MD, FAA  Emergency Medicine Staff  8:41 AM 6/23/2019      History     Chief Complaint   Patient presents with    Seizures     7 am    Hematuria     58 y.o. female with history of ESRD HD/MWF, CVA with residual left sided weakness, HTN, diabetes, and rheumatoid arthritis presents with daughter and  reporting possible blood in her urine or groin site. 2 episodes of blood on wiping vs toilet. She denies any dysuria, vaginal discharge, hesitancy, but does report urinary frequency in the last 48 hrs. No reported history of nephrolithiasis, vaginal bleeding, abdominal pain or back pain. Family reports patient having a clogged graft which led to ED procedure yesterday. They de clogged it and prior to the procedure, had phlebotomy IV access to left groin region. They also report patient had witnessed seizure of less than a minute today prior to ED arrival.  reports patient did not take any of her medications yesterday because they told her to be NPO. They report the seizure today is typical to previous seizures in the past and assure the patient is herself/baseline. She denies fever, chills or other complaints. She additionally reports non radiating circumferential band like headache, with no associated photophobia or phonophobia which is typical to when she has a seizure. She denies any new dizziness, numbness, weakness, or any other complaints.    The history is provided by the patient and medical records.     Review of patient's allergies indicates:   Allergen Reactions    Lisinopril Other (See Comments)     Angioedema      Vicodin  [hydrocodone-acetaminophen] Rash     No problem with acetaminophen      Past Medical History:   Diagnosis Date    Anemia of chronic disease 6/10/2017    Anxiety     Arthritis of right acromioclavicular joint 7/2/2014    Asthma     Bipolar disorder     Bronchitis, acute     Cataract     Cholelithiasis     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    Cortical cataract of both eyes 7/26/2016    Decubitus ulcer of buttock, stage 2     Degeneration of lumbar or lumbosacral intervertebral disc 3/5/2013    S/p MRI L-spine 5/2009     Depression     ESRD on hemodialysis     Started August 2018    General anesthetics causing adverse effect in therapeutic use     Hemorrhagic cerebrovascular accident (CVA)     8/2016 s/p Hemicraniotomy at [facility] with Left hemiparesis    Hemorrhagic cerebrovascular accident (CVA) 1/3/2018    History of stroke 6/28/2017    s/p R-MCA stroke with R-putaminal hemorrhagic transformation in 8/2016 and 11/2016 (s/p hemicraniotomy at [facility]) with residual L hemiparesis, on AED s/p CVA      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    Hyperlipidemia     Hypertensive retinopathy of both eyes 7/26/2016    Impingement syndrome of right shoulder 7/2/2014    Left ventricular diastolic dysfunction with preserved systolic function 12/15/2015    Mixed anxiety and depressive disorder 3/5/2013    Obesity     THERESA (obstructive sleep apnea) 3/5/2013    No Home CPAP 2ndary to cost     Partial symptomatic epilepsy with complex partial seizures, not intractable, without status epilepticus     PEG (percutaneous endoscopic gastrostomy) status 6/28/2017    Renovascular hypertension 3/5/2013    Will resume home medications: amlodipine, labetalol, and hydralazine Will hold Lisinopril in setting of DARLING.    Rheumatoid arthritis(714.0)     Rotator cuff tear 7/2/2014    S/P breast biopsy, left 3/5/2013    Benign Breast Bx     S/P R shoulder surgery, SAD, DCE, biceps tenotomy  7/15/2014    S/P total hysterectomy 7/10/2013    Sarcoidosis     Stroke 2016    left sided flaccidity, SAH    Type 2 diabetes mellitus with both eyes affected by moderate nonproliferative retinopathy without macular edema, without long-term current use of insulin 8/2/2017    Type 2 diabetes mellitus with diabetic polyneuropathy, without long-term current use of insulin 10/22/2015    Type 2 diabetes mellitus with stage 3 chronic kidney disease, without long-term current use of insulin 10/22/2015    Vertebral artery stenosis 3/5/2013    S/p Stenting per Dr Burnett      Past Surgical History:   Procedure Laterality Date    ARTHROSCOPY-SHOULDER WITH SUBACROMIAL DECOMPRESSION Right 7/9/2014    Performed by Kendall Pantoja [facility] at [facility]    AV GRAFT CREATION Right 10/18/2018    Performed by Meir Valencia MD at [facility] OR 2ND FLR    Biceps Tenotomy Right 7/9/2014    Performed by Kendall Pantoja [facility] at [facility] OR    BREAST SURGERY      breast reduction    COLONOSCOPY N/A 8/11/2016    Performed by Jerry Vilchis MD at [facility] ENDO (4TH FLR)    DEBRIDEMENT-SHOULDER-ARTHROSCOPIC Labral Cuff Right 7/9/2014    Performed by Kendall Pantoja [facility] at [facility]    ESOPHAGOGASTRODUODENOSCOPY (EGD) N/A 12/6/2017    Per[facility] by Dallas Lock Jr., [facility] at [facility] ENDO    [id number]-ODTBDB END Right 7/9/2014    Performed by Kendall Pantoja MD at [facility] OR    Fistulogram Right 2/11/2019    Performed by Meir Valencia MD at [facility] CATH LAB    HYSTERECTOMY      ROTATOR CUFF REPAIR Right July 9, 2014    right side    Skull surgery      Aneurysm    stent placed      in vertebral artery    TOTAL REDUCTION MAMMOPLASTY      TUBAL LIGATION       Family History   Problem Relation Age of Onset    Cancer Mother 55        Breast cancer    Diabetes Mother     Hypertension Mother     Heart disease Mother     Heart attack Mother     Breast cancer Mother     Stroke Sister     Hypertension Sister     Sleep apnea  "Sister     No Known Problems Daughter     Diabetes Son         controlled    Bell's palsy Sister     Lupus Sister     Blindness Maternal Grandmother     Diabetes Unknown         "My entire family family has diabetes"    Stroke Maternal Aunt     Amblyopia Neg Hx     Cataracts Neg Hx     Glaucoma Neg Hx     Macular degeneration Neg Hx     Retinal detachment Neg Hx     Strabismus Neg Hx      Social History     Tobacco Use    Smoking status: Never Smoker    Smokeless tobacco: Never Used   Substance Use Topics    Alcohol use: No     Comment: occasional wine cooler     Drug use: No     Review of Systems     CONST: No fever, chills, weight change, or fatigue.  HEENT: No headache, blurry vision/change in vision, sore throat, ear pain, eye pain, otorrhea, rhinorrhea, tooth pain, swelling, or voice changes.  NECK: No pain, masses, trauma, or redness.  HEART: No pain, palpitations, diaphoresis, nausea, or vomiting  LUNG: No SOB, cough, orthopnea, BLANKENSHIP or other complaints.  ABDOMEN: No pain, nausea, vomiting, diarrhea, constipation, or flank pain  : No discharge, dysuria, lesions, rashes, masses, sores  EXTREMITIES: FROM with No swelling, redness, injuries/trauma, lesions, sores, weakness, numbness, or tingling  NEURO: No dizziness, weakness, fatigue, tremors, headache, change in vision or disturbances of balance or coordination  SKIN: No lesions, rashes, trauma or other complaints    Physical Exam     Initial Vitals [06/21/19 0840]   BP Pulse Resp Temp SpO2   (!) 230/105 70 18 98.9 °F (37.2 °C) 96 %      MAP       --         Physical Exam  PHYSICAL EXAM:  GENERAL: Calm; Cooperative; Well-appearing and Non-Toxic; Obese; NAD.  HEENT: +R Temp deformity with fully healed scars; acutely AT/NC; Anicteric; speaking full sentences with no slurring of speech or drooling/inability to tolerate oral secretions.  NECK: Supple, FROM with no meningismus, no accessory muscle use.   HEART: Regular rate and rhythm, no " M/G/T.  LUNGS: No Tachypnea, No Retractions, and CTA B/L with no W/R/R.  ABDOMEN/PELVIC: +BS, Soft, ND, NTTP. No appreciated dried blood to pelvic region with no blood at vaginal vault or urethral opening.  Brown stool with no appreciated rectal bleeding.  BACK: Atraumatic, No midline TTP to C/T/LS spine; No CVA tenderness B/L.  EXTREMITIES:  Baseline ROM with 4/5 LUE vs 5/5 RUE.   SKIN: Warm, Dry, No Skin Tears or Rashes.  VASCULAR: 2+ pulses Prox/Dist & Symmetrical with No delay.  NEUROLOGIC: AAOx3; baseline delayed repair response, but answering questions appropriately.  No acute focal deficit.    ED Course   Procedures  Labs Reviewed   URINALYSIS, REFLEX TO URINE CULTURE     EKG Readings: (Independently Interpreted)   Sinus rhythm at a rate of 67 beats per minute; AZ prolongation consistent with first-degree AV block at 260 millisecond; QRS and QTC intervals within normal limits; no STEMI or evidence of hyperacute/peaked T-waves that would indicate hypokalemic state at this time.  ____________________  Torres Rivera MD, [facility]  Emergency Medicine Staff  11:13 AM 6/21/2019         Imaging Results    None          Medical Decision Making:   History:   Old Medical Records: I decided to obtain old medical records.  Old Records Summarized: records from clinic visits and other records.  Initial Assessment:   Afebrile, atraumatic and hemodynamically stable female presents with signs and symptoms of medication noncompliance seizure and self report of bleeding and groin.  Objectively, no source of bleeding appreciated, which included no blood per vaginal vault or urethral meatus.  No bleeding per rectum appreciated. Patient is taking her antihypertensive medications and we will perform UA given her increased urinary frequency. I anticipate discharge so she can get to her hemodialysis 11:00 a.m. today.  They refused phlebotomy at this time.  ____________________  Torres Rivera MD, [facility]  Emergency Medicine Staff  9:36 AM  6/21/2019    F/U:  On micturition, patient had hematuria with no clots.  Uncertain history of nephrolithiasis.  Despite benign abdomen with no CVA tenderness to percussion, will obtain labs and EKG to assure no anemia, hyperkalemia that may warrant emergent hemodialysis as today is her usual/regular dialysis day and will perform CT with no IV contrast given her history of end-stage renal disease for evaluation of hematuria being of stone etiology.  ____________________  Torres Rivera MD, [facility]  Emergency Medicine Staff  10:54 AM 6/21/2019    F/U:  Increased frequency with hematuria possibly secondary to nephrolithiasis.  Lack of leuks decreases likelihood of infection as a cause of her hematuria.  ____________________  Torres Rivera MD, [facility]  Emergency Medicine Staff  11:35 AM 6/21/2019    F/U:    Contains abnormal data CT Renal Stone Study ABD Pelvis WO (Final result)   Result time 06/21/19 13:25:52   Final result by Maciej Chavez MD (06/21/19 13:25:52)             Impression:       1. No radiodense nephrolithiasis or ureteral calculus seen to explain patient's symptoms of hematuria.  2. Cholelithiasis with suspected nonspecific gallbladder wall thickening.  Correlate clinically.  Further evaluation with gallbladder ultrasound can be obtained as warranted.  3. Trace free fluid at the mesenteric root and right pelvis which could be related to nonspecific trace abdominal ascites tracking to this region, potentially from an underlying occult enteritis or colitis or hepatic disease.  Correlate clinically.  4. Bibasilar tree-in-bud opacities, nonspecific but can be seen with multifocal infectious or inflammatory bronchopneumonia or aspiration.  5. Hysterectomy.  6. Few additional findings as above.  This report was flagged in Epic as abnormal.  This report was flagged in Epic as containing an incidental finding.      Electronically signed by: Maciej Chavez MD  Date: 06/21/2019  Time:  13:25        Narrative:     EXAMINATION:  CT RENAL STONE STUDY ABD PELVIS WO    CLINICAL HISTORY:  Hematuria;    TECHNIQUE:  Low dose axial images, sagittal and coronal reformations were obtained from the lung bases to the pubic symphysis.  Contrast was not administered.    COMPARISON:  Chest radiograph 06/13/2019, right upper quadrant ultrasound and CT renal stone study 08/25/2018    FINDINGS:  Study is just now submitted for interpretation secondary to technical difficulties with PACS.  Beam hardening with streak artifact from overlying monitoring leads and left upper extremity as well as increased image noise from body habitus somewhat limits evaluation.    Interval patchy tree-in-bud opacities within the left greater than right lower lobes and also right middle lobe.  Scattered bandlike opacities consistent with platelike scarring versus atelectasis.    Base of the heart is enlarged without significant pericardial fluid noting coronary arterial with aortic and mitral annular calcifications.    Cholelithiasis.  Gallbladder wall appears diffusely mildly thickened.  No biliary ductal dilatation.  Spleen is normal in size containing several small parenchymal calcifications suggesting prior granulomatous disease.  Noncontrast appearance of the liver, pancreas, stomach, duodenum and bilateral adrenal glands are within normal limits.    Bilateral kidneys are stable in size, shape and location.  Minimal left renal vascular calcifications noted at the hilum.  No radiodense calculus seen within the collecting systems on either side or urinary bladder.  No hydronephrosis.  There is bilateral mild nonspecific perinephric stranding similar to prior.  The urinary bladder is within normal limits.  Hysterectomy.  No adnexal mass.  Pelvic phleboliths noted.    Aorta is atherosclerotic but not aneurysmal.    Trace volume free fluid at the right aspect of the mesenteric root and also within the right pelvis.  No large volume  abdominal ascites seen.  No free air or lymphadenopathy.    Small fat containing umbilical hernia.  Appendix and terminal ileum are within normal limits.  Mild amount of scattered colonic fecal material.  No evidence of bowel obstruction.  No pneumatosis or portal venous gas.    Included osseous structures appear grossly stable without acute or destructive process seen.  ____________________  Torres Rivera MD, [facility]  Emergency Medicine Staff  1:44 PM 6/21/2019    F/U:  Patient duplex with no acute occlusion.  Able to use AV site for hemodialysis.  ____________________  Torres Rivera MD, [facility]  Emergency Medicine Staff  3:57 PM 6/21/2019      Clinical Tests:   Lab Tests: Ordered and Reviewed  Radiological Study: Ordered            Scribe Attestation:   Scribe #1: I performed the above scribed service and the documentation accurately describes the services I performed. I attest to the accuracy of the note.               Clinical Impression:   No diagnosis found.                             Fidel Rivera MD  06/23/19 0841

## 2019-06-21 NOTE — CONSULTS
[facility]  Vascular Surgery  Consult Note    Inpatient consult to Vascular Surgery  Consult performed by: Gaby Kendall MD  Consult ordered by: Fidel Rivera MD  Reason for consult: RUE AVG issue  Assessment/Recommendations: 59 yo woman ELSD HD via RUE AVG, declot yesterday.  Here with swelling, bruising.  US with good flows.    OK to use AVG for HD.  Needs to f/u with Dr. Valencia on Thursday.    Thank you for allowing us to participate in care of Lucy Perez, please re consult should there be any concerns, questions or changes in clinical status.           Subjective:     Chief Complaint/Reason for Admission: RUE AVG problem    History of Present Illness:   Patient is a 58 y.o. female with a history of HTN, HLD, L hemiplegia after large stroke in 2016, DM II, stage V CKD since August 2018 on HD MWF via RUE AVG.    She underwent declot procedure on 6/20/19 and presents to ED today with left arm swelling and bruising and seizure activity.     She has a residual impairment on her left upper extremity and left lower extremity from her stroke and is currently wheel chair bound. She has complete function of her right arm currently. She is right handed. No history of trauma to right arm.   Was seeing Dr. Blank for nephology but he no longer works at [facility].     (Not in a hospital admission)    Review of patient's allergies indicates:   Allergen Reactions    Lisinopril Other (See Comments)     Angioedema      Vicodin [hydrocodone-acetaminophen] Rash     No problem with acetaminophen        Past Medical History:   Diagnosis Date    Anemia of chronic disease 6/10/2017    Anxiety     Arthritis of right acromioclavicular joint 7/2/2014    Asthma     Bipolar disorder     Bronchitis, acute     Cataract     Cholelithiasis     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    Cortical cataract of both eyes 7/26/2016    Decubitus ulcer of  buttock, stage 2     Degeneration of lumbar or lumbosacral intervertebral disc 3/5/2013    S/p MRI L-spine 5/2009     Depression     ESRD on hemodialysis     Started August 2018    General anesthetics causing adverse effect in therapeutic use     Hemorrhagic cerebrovascular accident (CVA)     8/2016 s/p Hemicraniotomy at [facility] with Left hemiparesis    Hemorrhagic cerebrovascular accident (CVA) 1/3/2018    History of stroke 6/28/2017    s/p R-MCA stroke with R-putaminal hemorrhagic transformation in 8/2016 and 11/2016 (s/p hemicraniotomy at [facility]) with residual L hemiparesis, on AED s/p CVA      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    Hyperlipidemia     Hypertensive retinopathy of both eyes 7/26/2016    Impingement syndrome of right shoulder 7/2/2014    Left ventricular diastolic dysfunction with preserved systolic function 12/15/2015    Mixed anxiety and depressive disorder 3/5/2013    Obesity     THERESA (obstructive sleep apnea) 3/5/2013    No Home CPAP 2ndary to cost     Partial symptomatic epilepsy with complex partial seizures, not intractable, without status epilepticus     PEG (percutaneous endoscopic gastrostomy) status 6/28/2017    Renovascular hypertension 3/5/2013    Will resume home medications: amlodipine, labetalol, and hydralazine Will hold Lisinopril in setting of DARLING.   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 without macular edema, without long-term current use of insulin 8/2/2017    Type 2 diabetes mellitus with diabetic polyneuropathy, without long-term current use of insulin 10/22/2015    Type 2 diabetes mellitus with stage 3 chronic kidney disease, without  long-term current use of insulin 10/22/2015    Vertebral artery stenosis 3/5/2013    S/p Stenting per Dr Burnett      Past Surgical History:   Procedure Laterality Date    ARTHROSCOPY-SHOULDER WITH SUBACROMIAL DECOMPRESSION Right 7/9/2014    Performed by Kendall Pantoja MD at [facility] [place] OR    AV GRAFT CREATION Right 10/18/2018    [facility] 2ND FLR    Biceps Tenotomy Right 7/9/2014    Performed by Kendall Pantoja MD at [facility] [place] OR    BREAST SURGERY      breast reduction    COLONOSCOPY N/A 8/11/2016    Performed by Jerry [facility]chis MD at [facility] ENDO (4TH FLR)    DEBRIDEMENT-SHOULDER-ARTHROSCOPIC Labral Cuff Right 7/9/2014    Performed by Kendall Pantoja MD at [facility] [place] OR    DECLOT-GRAFT Right 6/20/2019    Performed by Cabrera Irwin MD at [facility] CATH LAB    ESOPHAGOGASTRODUODENOSCOPY (EGD) N/A 12/6/2017    Per[facility] by Dallas Lock Jr[facility] at [facility] ENDO    [id number]-[id number] END Right 7/9/2014    Performed by Kendall Pantoja MD at [facility] [place] OR    Fistulogram Right 2/11/2019    Performed by Meir [facility] at [facility] CATH LAB    HYSTERECTOMY      ROTATOR CUFF REPAIR Right July 9, 2014    right side    Skull surgery      Aneurysm    stent placed      in vertebral artery    TOTAL REDUCTION MAMMOPLASTY      TUBAL LIGATION       Family History     Problem Relation (Age of Onset)    Bell's palsy Sister    Blindness Maternal Grandmother    Breast cancer Mother    Cancer Mother (55)    Diabetes Mother, Son,     Heart attack Mother    Heart disease Mother    Hypertension Mother, Sister    Lupus Sister    No Known Problems Daughter    Sleep apnea Sister    Stroke Sister, Maternal Aunt        Tobacco Use    Smoking status: Never Smoker    Smokeless tobacco: Never Used   Substance and Sexual Activity    Alcohol use: No     Comment: occasional wine cooler     Drug use: No    Sexual activity: Yes     Partners: Male     Review of Systems  Objective:     Vital Signs  (Most Recent):  Temp: 98.9 °F (37.2 °C) (06/21/19 0840)  Pulse: 68 (06/21/19 1401)  Resp: 18 (06/21/19 1401)  BP: (!) 161/83 (06/21/19 1401)  SpO2: 96 % (06/21/19 1229) Vital Signs (24h Range):  Temp:  [98.9 °F (37.2 °C)] 98.9 °F (37.2 °C)  Pulse:  [66-75] 68  Resp:  [18-22] 18  SpO2:  [95 %-96 %] 96 %  BP: (149-230)/() 161/83     Weight: 98 kg (216 lb)  Body mass index is 38.26 kg/m².        Physical Exam  GEN: No acute distress  HEENT: normocephalic, atraumatic  CV: RRR, no murmurs, rubs, gallops  Resp: normal work of breathing, full inspiratory expansion, clear to auscultation  Abd: non distended, bowel sounds normal  Ext: Distal pulses intact, no peripheral edema  Right upper arm good thrill on AVG some bruising and slight swelling  Neuro: alert and oriented x3  Skin: no rashes, bruising      Significant Labs:  CBC:   Recent Labs   Lab 06/21/19  1142   WBC 9.71   RBC 3.58*   HGB 11.9*   HCT 37.8      *   MCH 33.2*   MCHC 31.5*     CMP:   Recent Labs   Lab 06/21/19  1340   *   CALCIUM 9.9   ALBUMIN 3.5   PROT 8.3   *   K 6.1*   CO2 23   CL 96   BUN 90*   CREATININE 9.6*   ALKPHOS 89   ALT 16   *   BILITOT 1.6*       Significant Diagnostics:  VAS ultrasound reviewed. good flows in AVG, no hematoma.     Assessment/Plan:     Active Diagnoses:    Diagnosis Date Noted POA    ESRD (end stage renal disease) on dialysis [N18.6, Z99.2] 08/11/2018 Not Applicable      Problems Resolved During this Admission:       Thank you for your consult. I will sign off. Please contact us if you have any additional questions.    Yola Kendall MD  Vascular Surgery  [facility]

## 2019-06-22 PROBLEM — K80.20 CHOLELITHIASIS: Status: ACTIVE | Noted: 2019-06-22

## 2019-06-22 LAB
ALBUMIN SERPL BCP-MCNC: 3.1 G/DL (ref 3.5–5.2)
AMORPH CRY UR QL COMP ASSIST: NORMAL
ANION GAP SERPL CALC-SCNC: 17 MMOL/L (ref 8–16)
BACTERIA #/AREA URNS AUTO: NORMAL /HPF
BASOPHILS # BLD AUTO: 0.04 K/UL (ref 0–0.2)
BASOPHILS NFR BLD: 0.5 % (ref 0–1.9)
BILIRUB UR QL STRIP: NEGATIVE
BUN SERPL-MCNC: 86 MG/DL (ref 6–20)
CALCIUM SERPL-MCNC: 9.6 MG/DL (ref 8.7–10.5)
CHLORIDE SERPL-SCNC: 101 MMOL/L (ref 95–110)
CLARITY UR REFRACT.AUTO: ABNORMAL
CO2 SERPL-SCNC: 21 MMOL/L (ref 23–29)
COLOR UR AUTO: ABNORMAL
CREAT SERPL-MCNC: 7.4 MG/DL (ref 0.5–1.4)
DIFFERENTIAL METHOD: ABNORMAL
EOSINOPHIL # BLD AUTO: 0.2 K/UL (ref 0–0.5)
EOSINOPHIL NFR BLD: 2.9 % (ref 0–8)
ERYTHROCYTE [DISTWIDTH] IN BLOOD BY AUTOMATED COUNT: 13.2 % (ref 11.5–14.5)
EST. GFR  (AFRICAN AMERICAN): 6.3 ML/MIN/1.73 M^2
EST. GFR  (NON AFRICAN AMERICAN): 5.5 ML/MIN/1.73 M^2
GLUCOSE SERPL-MCNC: 165 MG/DL (ref 70–110)
GLUCOSE UR QL STRIP: ABNORMAL
HCT VFR BLD AUTO: 34.8 % (ref 37–48.5)
HGB BLD-MCNC: 11 G/DL (ref 12–16)
HGB UR QL STRIP: ABNORMAL
HYALINE CASTS UR QL AUTO: 0 /LPF
IMM GRANULOCYTES # BLD AUTO: 0.03 K/UL (ref 0–0.04)
IMM GRANULOCYTES NFR BLD AUTO: 0.4 % (ref 0–0.5)
KETONES UR QL STRIP: NEGATIVE
LEUKOCYTE ESTERASE UR QL STRIP: NEGATIVE
LYMPHOCYTES # BLD AUTO: 1 K/UL (ref 1–4.8)
LYMPHOCYTES NFR BLD: 13.2 % (ref 18–48)
MAGNESIUM SERPL-MCNC: 2.8 MG/DL (ref 1.6–2.6)
MCH RBC QN AUTO: 32.6 PG (ref 27–31)
MCHC RBC AUTO-ENTMCNC: 31.6 G/DL (ref 32–36)
MCV RBC AUTO: 103 FL (ref 82–98)
MICROSCOPIC COMMENT: NORMAL
MONOCYTES # BLD AUTO: 0.7 K/UL (ref 0.3–1)
MONOCYTES NFR BLD: 9.6 % (ref 4–15)
NEUTROPHILS # BLD AUTO: 5.4 K/UL (ref 1.8–7.7)
NEUTROPHILS NFR BLD: 73.4 % (ref 38–73)
NITRITE UR QL STRIP: NEGATIVE
NRBC BLD-RTO: 0 /100 WBC
PH UR STRIP: 6 [PH] (ref 5–8)
PHOSPHATE SERPL-MCNC: 6.9 MG/DL (ref 2.7–4.5)
PLATELET # BLD AUTO: 181 K/UL (ref 150–350)
PMV BLD AUTO: 10.8 FL (ref 9.2–12.9)
POCT GLUCOSE: 142 MG/DL (ref 70–110)
POCT GLUCOSE: 162 MG/DL (ref 70–110)
POCT GLUCOSE: 267 MG/DL (ref 70–110)
POCT GLUCOSE: 358 MG/DL (ref 70–110)
POCT GLUCOSE: 387 MG/DL (ref 70–110)
POTASSIUM SERPL-SCNC: 5.1 MMOL/L (ref 3.5–5.1)
PROT UR QL STRIP: ABNORMAL
RBC # BLD AUTO: 3.37 M/UL (ref 4–5.4)
RBC #/AREA URNS AUTO: 0 /HPF (ref 0–4)
SODIUM SERPL-SCNC: 139 MMOL/L (ref 136–145)
SP GR UR STRIP: 1.01 (ref 1–1.03)
SQUAMOUS #/AREA URNS AUTO: 3 /HPF
URN SPEC COLLECT METH UR: ABNORMAL
WBC # BLD AUTO: 7.3 K/UL (ref 3.9–12.7)
WBC #/AREA URNS AUTO: 5 /HPF (ref 0–5)

## 2019-06-22 PROCEDURE — 80069 RENAL FUNCTION PANEL: CPT | Mod: HCNC,NTX

## 2019-06-22 PROCEDURE — 90935 HEMODIALYSIS ONE EVALUATION: CPT | Mod: HCNC,GC,NTX, | Performed by: INTERNAL MEDICINE

## 2019-06-22 PROCEDURE — 99225 PR SUBSEQUENT OBSERVATION CARE,LEVEL II: CPT | Mod: HCNC,NTX,, | Performed by: NURSE PRACTITIONER

## 2019-06-22 PROCEDURE — 99214 PR OFFICE/OUTPT VISIT, EST, LEVL IV, 30-39 MIN: ICD-10-PCS | Mod: 25,HCNC,GC,NTX | Performed by: INTERNAL MEDICINE

## 2019-06-22 PROCEDURE — 25000003 PHARM REV CODE 250: Mod: HCNC,NTX | Performed by: PHYSICIAN ASSISTANT

## 2019-06-22 PROCEDURE — 63600175 PHARM REV CODE 636 W HCPCS: Mod: HCNC,NTX | Performed by: PHYSICIAN ASSISTANT

## 2019-06-22 PROCEDURE — 25000003 PHARM REV CODE 250: Mod: HCNC,NTX | Performed by: NURSE PRACTITIONER

## 2019-06-22 PROCEDURE — G0257 UNSCHED DIALYSIS ESRD PT HOS: HCPCS | Mod: HCNC,NTX

## 2019-06-22 PROCEDURE — G0378 HOSPITAL OBSERVATION PER HR: HCPCS | Mod: HCNC,NTX

## 2019-06-22 PROCEDURE — 80100016 HC MAINTENANCE HEMODIALYSIS: Mod: HCNC,NTX

## 2019-06-22 PROCEDURE — 94761 N-INVAS EAR/PLS OXIMETRY MLT: CPT | Mod: HCNC,NTX

## 2019-06-22 PROCEDURE — 99214 OFFICE O/P EST MOD 30 MIN: CPT | Mod: 25,HCNC,GC,NTX | Performed by: INTERNAL MEDICINE

## 2019-06-22 PROCEDURE — 81001 URINALYSIS AUTO W/SCOPE: CPT | Mod: HCNC,NTX

## 2019-06-22 PROCEDURE — 99225 PR SUBSEQUENT OBSERVATION CARE,LEVEL II: ICD-10-PCS | Mod: HCNC,NTX,, | Performed by: NURSE PRACTITIONER

## 2019-06-22 PROCEDURE — 25000003 PHARM REV CODE 250: Mod: HCNC,NTX | Performed by: INTERNAL MEDICINE

## 2019-06-22 PROCEDURE — 36415 COLL VENOUS BLD VENIPUNCTURE: CPT | Mod: HCNC,NTX

## 2019-06-22 PROCEDURE — 85025 COMPLETE CBC W/AUTO DIFF WBC: CPT | Mod: HCNC,NTX

## 2019-06-22 PROCEDURE — 83735 ASSAY OF MAGNESIUM: CPT | Mod: HCNC,NTX

## 2019-06-22 PROCEDURE — 90935 PR HEMODIALYSIS, ONE EVALUATION: ICD-10-PCS | Mod: HCNC,GC,NTX, | Performed by: INTERNAL MEDICINE

## 2019-06-22 RX ORDER — SODIUM CHLORIDE 9 MG/ML
INJECTION, SOLUTION INTRAVENOUS ONCE
Status: COMPLETED | OUTPATIENT
Start: 2019-06-22 | End: 2019-06-22

## 2019-06-22 RX ORDER — SODIUM CHLORIDE 9 MG/ML
INJECTION, SOLUTION INTRAVENOUS
Status: DISCONTINUED | OUTPATIENT
Start: 2019-06-22 | End: 2019-06-23 | Stop reason: HOSPADM

## 2019-06-22 RX ORDER — HYDROCODONE BITARTRATE AND ACETAMINOPHEN 5; 325 MG/1; MG/1
1 TABLET ORAL ONCE AS NEEDED
Status: COMPLETED | OUTPATIENT
Start: 2019-06-22 | End: 2019-06-22

## 2019-06-22 RX ORDER — HYDROCODONE BITARTRATE AND ACETAMINOPHEN 5; 325 MG/1; MG/1
1 TABLET ORAL ONCE
Status: DISCONTINUED | OUTPATIENT
Start: 2019-06-22 | End: 2019-06-22

## 2019-06-22 RX ADMIN — FOLIC ACID 1 MG: 1 TABLET ORAL at 09:06

## 2019-06-22 RX ADMIN — INSULIN DETEMIR 10 UNITS: 100 INJECTION, SOLUTION SUBCUTANEOUS at 08:06

## 2019-06-22 RX ADMIN — SEVELAMER CARBONATE 800 MG: 800 TABLET, FILM COATED ORAL at 06:06

## 2019-06-22 RX ADMIN — MAGNESIUM CITRATE 296 ML: 1.75 LIQUID ORAL at 02:06

## 2019-06-22 RX ADMIN — CARVEDILOL 25 MG: 25 TABLET, FILM COATED ORAL at 09:06

## 2019-06-22 RX ADMIN — TRAMADOL HYDROCHLORIDE 50 MG: 50 TABLET, FILM COATED ORAL at 09:06

## 2019-06-22 RX ADMIN — LEVETIRACETAM 500 MG: 500 TABLET ORAL at 08:06

## 2019-06-22 RX ADMIN — BUPROPION HYDROCHLORIDE 300 MG: 300 TABLET, FILM COATED, EXTENDED RELEASE ORAL at 09:06

## 2019-06-22 RX ADMIN — SEVELAMER CARBONATE 800 MG: 800 TABLET, FILM COATED ORAL at 12:06

## 2019-06-22 RX ADMIN — INSULIN ASPART 3 UNITS: 100 INJECTION, SOLUTION INTRAVENOUS; SUBCUTANEOUS at 12:06

## 2019-06-22 RX ADMIN — PREGABALIN 50 MG: 50 CAPSULE ORAL at 08:06

## 2019-06-22 RX ADMIN — FAMOTIDINE 20 MG: 20 TABLET, FILM COATED ORAL at 09:06

## 2019-06-22 RX ADMIN — INSULIN ASPART 3 UNITS: 100 INJECTION, SOLUTION INTRAVENOUS; SUBCUTANEOUS at 08:06

## 2019-06-22 RX ADMIN — SODIUM BICARBONATE 650 MG TABLET 1300 MG: at 08:06

## 2019-06-22 RX ADMIN — ATORVASTATIN CALCIUM 40 MG: 20 TABLET, FILM COATED ORAL at 09:06

## 2019-06-22 RX ADMIN — SODIUM CHLORIDE: 0.9 INJECTION, SOLUTION INTRAVENOUS at 01:06

## 2019-06-22 RX ADMIN — HYDROCODONE BITARTRATE AND ACETAMINOPHEN 1 TABLET: 5; 325 TABLET ORAL at 06:06

## 2019-06-22 RX ADMIN — FUROSEMIDE 40 MG: 40 TABLET ORAL at 09:06

## 2019-06-22 RX ADMIN — SODIUM BICARBONATE 650 MG TABLET 1300 MG: at 09:06

## 2019-06-22 RX ADMIN — SEVELAMER CARBONATE 800 MG: 800 TABLET, FILM COATED ORAL at 09:06

## 2019-06-22 RX ADMIN — ACETAMINOPHEN 650 MG: 325 TABLET ORAL at 03:06

## 2019-06-22 RX ADMIN — LEVETIRACETAM 500 MG: 500 TABLET ORAL at 09:06

## 2019-06-22 NOTE — ASSESSMENT & PLAN NOTE
Patient presents with hematuria that started yesterday. UA with 3+ RBCs. No vaginal or rectal source noted on exam. CT RSS with no identified etiology.  - daily CBC  - recheck in AM  - Hgb at baseline 11-12  - if still present, consider consulting Urology  - otherwise, outpatient referral

## 2019-06-22 NOTE — ASSESSMENT & PLAN NOTE
Essential HTN  HTN urgency    Patient presented with HTN urgency on her HD day.   - Nephrology consulted for HD, completed with 800ml removed  - continue carvedilol 25mg BID  - BP much improved

## 2019-06-22 NOTE — PLAN OF CARE
Problem: Adult Inpatient Plan of Care  Goal: Plan of Care Review  Outcome: Ongoing (interventions implemented as appropriate)  Pt free of falls and injury. Pt AAOx4.Lethargic and somnolent.Tylenol for pain.Notified medicine team F of urinary retention , 175 cc per bladder scan.No new orders placed.External female catheter on.Bed low, breaks locked, SR up x2, call light within reach, at bedside, will continue to monitor.

## 2019-06-22 NOTE — HOSPITAL COURSE
Patient admitted to hospital medicine for hematuria and uremia. Patient with hematuria without infection noted on UA; will repeat with straight cath to ensure bleeding is from bladder and not vagina. Patient to be dialyzed today again as BUN 86 on morning labs. After second dialysis patient BUN improved greatly. RUQ US with acute process. Patient discharge with urology consult.

## 2019-06-22 NOTE — HPI
Patient is a 58 y.o. female with history of ESRD HD/MWF, CVA with residual left sided weakness, HTN, diabetes, and rheumatoid arthritis being admitted to observation for hematuria. Family reports 2 episodes of blood on wiping vs toilet. She denies any dysuria, vaginal discharge, hesitancy, but does report urinary frequency in the last 48 hrs. No reported history of nephrolithiasis, vaginal bleeding, abdominal pain or back pain. Family reports patient having a clogged graft which led to ED procedure yesterday. They de clogged it and prior to the procedure, had phlebotomy IV access to left groin region. They also report patient had witnessed seizure of less than a minute today prior to ED arrival.  reports patient did not take any of her medications yesterday because they told her to be NPO. They report the seizure today is typical to previous seizures in the past and assure the patient is herself/baseline. She denies fever, chills or other complaints. She additionally reports non radiating circumferential band like headache, with no associated photophobia or phonophobia which is typical to when she has a seizure. She denies any new dizziness, numbness, weakness, or any other complaints.    In the ED, /105. Intake labs remarkable for K 6.1, tbili 1.6, , UA with 3+ RBCs. CT abdomen with no source of hematuria. Nephrology consulted and HD completed.

## 2019-06-22 NOTE — PLAN OF CARE
Problem: Infection (Hemodialysis)  Goal: Absence of Infection Signs/Symptoms    Intervention: Prevent or Manage Infection  Aseptic tech maintained while accessing VIRGIE to prevent infection

## 2019-06-22 NOTE — ASSESSMENT & PLAN NOTE
Patient had seizure this morning in setting of HTN urgency and not taking her BP / seizure meds for 1 whole day. Last seizure over 1 year ago.  - resume keppra 500mg BID  - control BP  - at baseline

## 2019-06-22 NOTE — PROGRESS NOTES
Maintenance regular day 3.0 hour hd tx initiated via VIRGIE using 2 15g needles w/  achieved w/good  flows achieved. Lines taped securely and visible at all times.

## 2019-06-22 NOTE — PROGRESS NOTES
3 hr dialysis completed. 1.8 L fluid removed. Tatum pulled R UA AVF, pressure held. Albina drsg applied to needle site. Repositioned to R side per pt request. Report called to [facility]. Transport per bed requested.

## 2019-06-22 NOTE — PROGRESS NOTES
Report given to charge nurse Francheska COLLINS. Transferred from unit via stretcher by transport to room 0437

## 2019-06-22 NOTE — SUBJECTIVE & OBJECTIVE
Past Medical History:   Diagnosis Date    Anemia of chronic disease 6/10/2017    Anxiety     Arthritis of right acromioclavicular joint 7/2/2014    Asthma     Bipolar disorder     Bronchitis, acute     Cataract     Cholelithiasis     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    Cortical cataract of both eyes 7/26/2016    Decubitus ulcer of buttock, stage 2     Degeneration of lumbar or lumbosacral intervertebral disc 3/5/2013    S/p MRI L-spine 5/2009     Depression     ESRD on hemodialysis     Started August 2018    General anesthetics causing adverse effect in therapeutic use     Hemorrhagic cerebrovascular accident (CVA)     [place] [facility] [place]    Hemorrhagic cerebrovascular accident (CVA) 1/3/2018    History of stroke 6/28/2017    s/p R-MCA stroke with R-putaminal hemorrhagic transformation in 8/2016 and 11/2016 ([place] [facility] [place] with residual L hemiparesis, on AED s/p CVA      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    Hyperlipidemia     Hypertensive retinopathy of both eyes 7/26/2016    Impingement syndrome of right shoulder 7/2/2014    Left ventricular diastolic dysfunction with preserved systolic function 12/15/2015    Mixed anxiety and depressive disorder 3/5/2013    Obesity     THERESA (obstructive sleep apnea) 3/5/2013    No Home CPAP 2ndary to cost     Partial symptomatic epilepsy with complex partial seizures, not intractable, without status epilepticus     PEG (percutaneous endoscopic gastrostomy) status 6/28/2017    Renovascular hypertension 3/5/2013    Will resume home medications: amlodipine, labetalol, and hydralazine Will hold Lisinopril in setting of DARLING.    Rheumatoid arthritis(714.0)     Rotator cuff tear 7/2/2014    S/P breast biopsy, left 3/5/2013    Benign Breast Bx     S/P R shoulder surgery, SAD, DCE, biceps tenotomy 7/15/2014    S/P total hysterectomy 7/10/2013    Sarcoidosis     Stroke 2016     left sided flaccidity, SAH    Type 2 diabetes mellitus with both eyes affected by moderate nonproliferative retinopathy without macular edema, without long-term current use of insulin 8/2/2017    Type 2 diabetes mellitus with diabetic polyneuropathy, without long-term current use of insulin 10/22/2015    Type 2 diabetes mellitus with stage 3 chronic kidney disease, without long-term current use of insulin 10/22/2015    Vertebral artery stenosis 3/5/2013    S/p Stenting per Dr Burnett        Past Surgical History:   Procedure Laterality Date    ARTHROSCOPY-SHOULDER WITH SUBACROMIAL DECOMPRESSION Right 7/9/2014    Performed [facility] at [facility]    AV GRAFT CREATION Right 10/18/2018    Performed by Meir Valencia [facility] at [facility] OR 2ND FLR    Biceps Tenotomy Right 7/9/2014    Performed by [facility]ja [facility] at [facility] OR    BREAST SURGERY      breast reduction    COLONOSCOPY N/A 8/11/2016    Performed by Jerry Vilchis MD at [facility] ENDO (4TH FLR)    DEBRIDEMENT-SHOULDER-ARTHROSCOPIC Labral Cuff Right 7/9/2014    Performed by Kendall [facility]ja [facility] at [facility] OR    DECLOT-GRAFT Right 6/20/2019    Performed by Cabrera Irwin MD at [facility] CATH LAB    ESOPHAGOGASTRODUODENOSCOPY (EGD) N/A 12/6/2017    Per[facility] by Dallas Lock Jr[facility] at [facility] ENDO    VEQZBXLL-HWIDWPVI-SRNIPO END Right 7/9/2014    Performed by Kendall Pantoja MD at [facility] OR    Fistulogram Right 2/11/2019    Performed by Meir Valencia MD at [facility] CATH LAB    HYSTERECTOMY      ROTATOR CUFF REPAIR Right July 9, 2014    right side    Skull surgery      Aneurysm    stent placed      in vertebral artery    TOTAL REDUCTION MAMMOPLASTY      TUBAL LIGATION         Review of patient's allergies indicates:   Allergen Reactions    Lisinopril Other (See Comments)     Angioedema      Vicodin [hydrocodone-acetaminophen] Rash     No problem with acetaminophen        No current facility-administered medications on file  "prior to encounter.      Current Outpatient Medications on File Prior to Encounter   Medication Sig    acetaminophen (TYLENOL) 325 MG tablet Take 2 tablets (650 mg total) by mouth every 6 (six) hours as needed for Pain.    albuterol (PROVENTIL) 2.5 mg /3 mL (0.083 %) nebulizer solution Take 3 mLs (2.5 mg total) by nebulization every 6 (six) hours as needed for Wheezing. Rescue    ALPRAZolam (XANAX) 0.5 MG tablet 1 tab Every 8 hours as needed for anxiety    aspirin (ECOTRIN) 81 MG EC tablet Take 81 mg by mouth every morning.     atorvastatin (LIPITOR) 40 MG tablet TAKE 1 TABLET ONE TIME DAILY FOR CHOLESTEROL    BD INSULIN PEN NEEDLE UF SHORT 31 gauge x 5/16" Ndle USE TO INJECT NOVOLOG FLEXPEN BEFORE MEALS    blood sugar diagnostic (ACCU-CHEK SMARTVIEW TEST STRIP) Strp 1 strip by Misc.(Non-Drug; Combo Route) route 2 (two) times daily.    buPROPion (WELLBUTRIN XL) 300 MG 24 hr tablet Take 1 tablet (300 mg total) by mouth once daily.    carvedilol (COREG) 25 MG tablet Take 1 tablet (25 mg total) by mouth 2 (two) times daily.    dantrolene (DANTRIUM) 50 MG Cap Take 1 capsule (50 mg total) by mouth 3 (three) times daily.    DULCOLAX, BISACODYL, ORAL Take by mouth as needed (constipation).    ergocalciferol (ERGOCALCIFEROL) 50,000 unit Cap 1 capsule (50,000 Units total) by Per G Tube route every 7 days. (Patient taking differently: Take 50,000 Units by mouth every 7 days. Takes on Thurs)    famotidine (PEPCID) 20 MG tablet Take 1 tablet (20 mg total) by mouth once daily.    ferrous sulfate 220 mg (44 mg iron)/5 mL solution 10ml daily for Iron/Give via PEG (Patient taking differently: Take 220 mg by mouth every morning. )    flash glucose scanning reader (FREESTYLE JOSÉ LUIS 14 DAY READER) Misc Use as directed. Scan 4 times a day.    flash glucose sensor (FREESTYLE JOSÉ LUIS 14 DAY SENSOR) Kit Change every 14 days.    folic acid (FOLVITE) 1 MG tablet Take 1 tablet (1 mg total) by mouth once daily.    furosemide " (LASIX) 40 MG tablet Take 1 tablet (40 mg total) by mouth once daily. (Patient taking differently: Take 40 mg by mouth every morning. )    insulin (LANTUS SOLOSTAR U-100 INSULIN) glargine 100 units/mL (3mL) SubQ pen 12 units Subcutaneously at Bedtime    insulin glargine (LANTUS U-100 INSULIN) 100 unit/mL injection 10 units daily in the PM    levETIRAcetam (KEPPRA) 500 MG Tab Take 1 tablet (500 mg total) by mouth every 8 (eight) hours.    lidocaine-prilocaine (EMLA) cream Apply topically as needed.    polyethylene glycol 3350 (MIRALAX ORAL) Take by mouth every evening.     pregabalin (LYRICA) 25 MG capsule 1 cap in AM and 2 caps at Bedtime for muscle spasm    sevelamer carbonate (RENVELA) 800 mg Tab Take 800 mg by mouth 3 (three) times daily with meals.    sodium bicarbonate 650 MG tablet Take 2 tablets (1,300 mg total) by mouth 2 (two) times daily.    traMADol (ULTRAM) 50 mg tablet 1 tab Q6-8 hours as needed for joint pain     Family History     Problem Relation (Age of Onset)    Bell's palsy Sister    Blindness Maternal Grandmother    Breast cancer Mother    Cancer Mother (55)    Diabetes Mother, Son,     Heart attack Mother    Heart disease Mother    Hypertension Mother, Sister    Lupus Sister    No Known Problems Daughter    Sleep apnea Sister    Stroke Sister, Maternal Aunt        Tobacco Use    Smoking status: Never Smoker    Smokeless tobacco: Never Used   Substance and Sexual Activity    Alcohol use: No     Comment: occasional wine cooler     Drug use: No    Sexual activity: Yes     Partners: Male     Review of Systems   Constitutional: Negative for activity change, chills and fever.   HENT: Negative for trouble swallowing.    Eyes: Negative for photophobia and visual disturbance.   Respiratory: Negative for chest tightness, shortness of breath and wheezing.    Cardiovascular: Negative for chest pain, palpitations and leg swelling.   Gastrointestinal: Negative for abdominal distention,  constipation, diarrhea and nausea.   Genitourinary: Positive for hematuria. Negative for dysuria.   Musculoskeletal: Positive for joint swelling.   Neurological: Positive for tremors and seizures.   Psychiatric/Behavioral: Negative for agitation and confusion. The patient is not nervous/anxious.      Objective:     Vital Signs (Most Recent):  Temp: 97.8 °F (36.6 °C) (06/21/19 1951)  Pulse: 68 (06/21/19 1951)  Resp: 14 (06/21/19 1951)  BP: (!) 143/66 (06/21/19 1951)  SpO2: (!) 90 % (06/21/19 1951) Vital Signs (24h Range):  Temp:  [97.8 °F (36.6 °C)-98.9 °F (37.2 °C)] 97.8 °F (36.6 °C)  Pulse:  [63-75] 68  Resp:  [14-22] 14  SpO2:  [90 %-96 %] 90 %  BP: ()/() 143/66     Weight: 98 kg (216 lb)  Body mass index is 38.26 kg/m².    Physical Exam   Constitutional: She is oriented to person, place, and time. She appears well-developed and well-nourished. No distress.   HENT:   Head: Normocephalic and atraumatic.   Mouth/Throat: No oropharyngeal exudate.   Eyes: Pupils are equal, round, and reactive to light. Conjunctivae and EOM are normal.   Neck: Normal range of motion. Neck supple.   Cardiovascular: Normal rate, regular rhythm, normal heart sounds and intact distal pulses.   Pulmonary/Chest: Effort normal and breath sounds normal. No respiratory distress. She has no wheezes.   Abdominal: Soft. Bowel sounds are normal. She exhibits no distension. There is no tenderness.   Musculoskeletal: Normal range of motion. She exhibits no edema or tenderness.   RUE larger than LUE   Lymphadenopathy:     She has no cervical adenopathy.   Neurological: She is alert and oriented to person, place, and time. No cranial nerve deficit or sensory deficit. Coordination normal.   Delayed responses, but this is her baseline   Skin: Skin is warm and dry. Capillary refill takes less than 2 seconds. No rash noted.   Psychiatric: She has a normal mood and affect. Her behavior is normal. Judgment and thought content normal.   Nursing  note and vitals reviewed.        CRANIAL NERVES     CN III, IV, VI   Pupils are equal, round, and reactive to light.  Extraocular motions are normal.        Significant Labs:   BMP:   Recent Labs   Lab 06/21/19  1340 06/21/19  1558   *  --    *  --    K 6.1* 5.9*   CL 96  --    CO2 23  --    BUN 90*  --    CREATININE 9.6*  --    CALCIUM 9.9  --      CBC:   Recent Labs   Lab 06/21/19  1142   WBC 9.71   HGB 11.9*   HCT 37.8        All pertinent labs within the past 24 hours have been reviewed.    Significant Imaging: I have reviewed all pertinent imaging results/findings within the past 24 hours.

## 2019-06-22 NOTE — ASSESSMENT & PLAN NOTE
Patient presents with hematuria that started yesterday. UA with 3+ RBCs. No vaginal or rectal source noted on exam. CT RSS with no identified etiology.  - daily CBC  - recheck in AM  - Hgb at baseline 11-12  - will repeat to ensure from bladder; likely will follow up outpatient

## 2019-06-22 NOTE — H&P
[facility] Medicine  History & Physical    Patient Name: Lucy Perez  MRN: [id number]  Admission Date: 6/21/2019  Attending Physician: Karena Mott MD   Primary Care Provider: Beverly Muniz MD    [facility] Medicine Team: [facility] MED F Keisha Ybarra PA-C     Patient information was obtained from patient, past medical records and ER records.     Subjective:     Principal Problem:Hematuria    Chief Complaint:   Chief Complaint   Patient presents with    Seizures     7 am    Hematuria        HPI: Patient is a 58 y.o. female with history of ESRD HD/MWF, CVA with residual left sided weakness, HTN, diabetes, and rheumatoid arthritis being admitted to observation for hematuria. Family reports 2 episodes of blood on wiping vs toilet. She denies any dysuria, vaginal discharge, hesitancy, but does report urinary frequency in the last 48 hrs. No reported history of nephrolithiasis, vaginal bleeding, abdominal pain or back pain. Family reports patient having a clogged graft which led to ED procedure yesterday. They de clogged it and prior to the procedure, had phlebotomy IV access to left groin region. They also report patient had witnessed seizure of less than a minute today prior to ED arrival.  reports patient did not take any of her medications yesterday because they told her to be NPO. They report the seizure today is typical to previous seizures in the past and assure the patient is herself/baseline. She denies fever, chills or other complaints. She additionally reports non radiating circumferential band like headache, with no associated photophobia or phonophobia which is typical to when she has a seizure. She denies any new dizziness, numbness, weakness, or any other complaints.    In the ED, /105. Intake labs remarkable for K 6.1, tbili 1.6, , UA with 3+ RBCs. CT abdomen with no source of hematuria. Nephrology consulted and HD completed.      Past Medical History:   Diagnosis Date    Anemia of chronic disease 6/10/2017    Anxiety     Arthritis of right acromioclavicular joint 7/2/2014    Asthma     Bipolar disorder     Bronchitis, acute     Cataract     Cholelithiasis     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    Cortical cataract of both eyes 7/26/2016    Decubitus ulcer of buttock, stage 2     Degeneration of lumbar or lumbosacral intervertebral disc 3/5/2013    S/p MRI L-spine 5/2009     Depression     ESRD on hemodialysis     Started August 2018    General anesthetics causing adverse effect in therapeutic use     Hemorrhagic cerebrovascular accident (CVA)     8/2016 s/p Hemicraniotomy at [facility] with Left hemiparesis    Hemorrhagic cerebrovascular accident (CVA) 1/3/2018    History of stroke 6/28/2017    s/p R-MCA stroke with R-putaminal hemorrhagic transformation in 8/2016 and 11/2016 (s/p hemicraniotomy at [facility]) with residual L hemiparesis, on AED s/p CVA      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    Hyperlipidemia     Hypertensive retinopathy of both eyes 7/26/2016    Impingement syndrome of right shoulder 7/2/2014    Left ventricular diastolic dysfunction with preserved systolic function 12/15/2015    Mixed anxiety and depressive disorder 3/5/2013    Obesity     THERESA (obstructive sleep apnea) 3/5/2013    No Home CPAP 2ndary to cost     Partial symptomatic epilepsy with complex partial seizures, not intractable, without status epilepticus     PEG (percutaneous endoscopic gastrostomy) status 6/28/2017    Renovascular hypertension 3/5/2013    Will resume home medications: amlodipine, labetalol, and hydralazine Will hold Lisinopril in setting of DARLING.    Rheumatoid arthritis(714.0)     Rotator cuff tear 7/2/2014    S/P breast biopsy, left 3/5/2013    Benign Breast Bx     S/P R shoulder surgery, SAD, DCE, biceps tenotomy 7/15/2014    S/P total hysterectomy 7/10/2013    Sarcoidosis     Stroke  2016    left sided flaccidity, SAH    Type 2 diabetes mellitus with both eyes affected by moderate nonproliferative retinopathy without macular edema, without long-term current use of insulin 8/2/2017    Type 2 diabetes mellitus with diabetic polyneuropathy, without long-term current use of insulin 10/22/2015    Type 2 diabetes mellitus with stage 3 chronic kidney disease, without long-term current use of insulin 10/22/2015    Vertebral artery stenosis 3/5/2013    S/p Stenting per Dr Burnett        Past Surgical History:   Procedure Laterality Date    ARTHROSCOPY-SHOULDER WITH SUBACROMIAL DECOMPRESSION Right 7/9/2014    Performed by [facility] at [facility] OR    AV GRAFT CREATION Right 10/18/2018    Performed by [facility]ir [facility] at [facility] OR 2ND FLR    Biceps Tenotomy Right 7/9/2014    Per[facility] by [facility] at [facility] OR    BREAST SURGERY      breast reduction    COLONOSCOPY N/A 8/11/2016    Performed by Jerry Vilchis MD at [facility] ENDO (4TH FLR)    DEBRIDEMENT-SHOULDER-ARTHROSCOPIC Labral Cuff Right 7/9/2014    Performed by [facility]all [facility]ja [facility] at [facility] OR    DECLOT-GRAFT Right 6/20/2019    Performed by Cabrera Irwin MD at [facility] CATH LAB    ESOPHAGOGASTRODUODENOSCOPY (EGD) N/A 12/6/2017    Per[facility] by Dallas Lock Jr., [facility] ENDO    LIVKMJHI-QOKYTEWH-GGHUOJ END Right 7/9/2014    Performed by Kendall Pantoja MD at [facility] OR    Fistulogram Right 2/11/2019    Performed by Meir Valencia MD at [facility] CATH LAB    HYSTERECTOMY      ROTATOR CUFF REPAIR Right July 9, 2014    right side    Skull surgery      Aneurysm    stent placed      in vertebral artery    TOTAL REDUCTION MAMMOPLASTY      TUBAL LIGATION         Review of patient's allergies indicates:   Allergen Reactions    Lisinopril Other (See Comments)     Angioedema      Vicodin [hydrocodone-acetaminophen] Rash     No problem with acetaminophen        No current facility-administered medications on  "file prior to encounter.      Current Outpatient Medications on File Prior to Encounter   Medication Sig    acetaminophen (TYLENOL) 325 MG tablet Take 2 tablets (650 mg total) by mouth every 6 (six) hours as needed for Pain.    albuterol (PROVENTIL) 2.5 mg /3 mL (0.083 %) nebulizer solution Take 3 mLs (2.5 mg total) by nebulization every 6 (six) hours as needed for Wheezing. Rescue    ALPRAZolam (XANAX) 0.5 MG tablet 1 tab Every 8 hours as needed for anxiety    aspirin (ECOTRIN) 81 MG EC tablet Take 81 mg by mouth every morning.     atorvastatin (LIPITOR) 40 MG tablet TAKE 1 TABLET ONE TIME DAILY FOR CHOLESTEROL    BD INSULIN PEN NEEDLE UF SHORT 31 gauge x 5/16" Ndle USE TO INJECT NOVOLOG FLEXPEN BEFORE MEALS    blood sugar diagnostic (ACCU-CHEK SMARTVIEW TEST STRIP) Strp 1 strip by Misc.(Non-Drug; Combo Route) route 2 (two) times daily.    buPROPion (WELLBUTRIN XL) 300 MG 24 hr tablet Take 1 tablet (300 mg total) by mouth once daily.    carvedilol (COREG) 25 MG tablet Take 1 tablet (25 mg total) by mouth 2 (two) times daily.    dantrolene (DANTRIUM) 50 MG Cap Take 1 capsule (50 mg total) by mouth 3 (three) times daily.    DULCOLAX, BISACODYL, ORAL Take by mouth as needed (constipation).    ergocalciferol (ERGOCALCIFEROL) 50,000 unit Cap 1 capsule (50,000 Units total) by Per G Tube route every 7 days. (Patient taking differently: Take 50,000 Units by mouth every 7 days. Takes on Thurs)    famotidine (PEPCID) 20 MG tablet Take 1 tablet (20 mg total) by mouth once daily.    ferrous sulfate 220 mg (44 mg iron)/5 mL solution 10ml daily for Iron/Give via PEG (Patient taking differently: Take 220 mg by mouth every morning. )    flash glucose scanning reader (FREESTYLE JOSÉ LUIS 14 DAY READER) Misc Use as directed. Scan 4 times a day.    flash glucose sensor (FREESTYLE JOSÉ LUIS 14 DAY SENSOR) Kit Change every 14 days.    folic acid (FOLVITE) 1 MG tablet Take 1 tablet (1 mg total) by mouth once daily.    " furosemide (LASIX) 40 MG tablet Take 1 tablet (40 mg total) by mouth once daily. (Patient taking differently: Take 40 mg by mouth every morning. )    insulin (LANTUS SOLOSTAR U-100 INSULIN) glargine 100 units/mL (3mL) SubQ pen 12 units Subcutaneously at Bedtime    insulin glargine (LANTUS U-100 INSULIN) 100 unit/mL injection 10 units daily in the PM    levETIRAcetam (KEPPRA) 500 MG Tab Take 1 tablet (500 mg total) by mouth every 8 (eight) hours.    lidocaine-prilocaine (EMLA) cream Apply topically as needed.    polyethylene glycol 3350 (MIRALAX ORAL) Take by mouth every evening.     pregabalin (LYRICA) 25 MG capsule 1 cap in AM and 2 caps at Bedtime for muscle spasm    sevelamer carbonate (RENVELA) 800 mg Tab Take 800 mg by mouth 3 (three) times daily with meals.    sodium bicarbonate 650 MG tablet Take 2 tablets (1,300 mg total) by mouth 2 (two) times daily.    traMADol (ULTRAM) 50 mg tablet 1 tab Q6-8 hours as needed for joint pain     Family History     Problem Relation (Age of Onset)    Bell's palsy Sister    Blindness Maternal Grandmother    Breast cancer Mother    Cancer Mother (55)    Diabetes Mother, Son,     Heart attack Mother    Heart disease Mother    Hypertension Mother, Sister    Lupus Sister    No Known Problems Daughter    Sleep apnea Sister    Stroke Sister, Maternal Aunt        Tobacco Use    Smoking status: Never Smoker    Smokeless tobacco: Never Used   Substance and Sexual Activity    Alcohol use: No     Comment: occasional wine cooler     Drug use: No    Sexual activity: Yes     Partners: Male     Review of Systems   Constitutional: Negative for activity change, chills and fever.   HENT: Negative for trouble swallowing.    Eyes: Negative for photophobia and visual disturbance.   Respiratory: Negative for chest tightness, shortness of breath and wheezing.    Cardiovascular: Negative for chest pain, palpitations and leg swelling.   Gastrointestinal: Negative for abdominal  distention, constipation, diarrhea and nausea.   Genitourinary: Positive for hematuria. Negative for dysuria.   Musculoskeletal: Positive for joint swelling.   Neurological: Positive for tremors and seizures.   Psychiatric/Behavioral: Negative for agitation and confusion. The patient is not nervous/anxious.      Objective:     Vital Signs (Most Recent):  Temp: 97.8 °F (36.6 °C) (06/21/19 1951)  Pulse: 68 (06/21/19 1951)  Resp: 14 (06/21/19 1951)  BP: (!) 143/66 (06/21/19 1951)  SpO2: (!) 90 % (06/21/19 1951) Vital Signs (24h Range):  Temp:  [97.8 °F (36.6 °C)-98.9 °F (37.2 °C)] 97.8 °F (36.6 °C)  Pulse:  [63-75] 68  Resp:  [14-22] 14  SpO2:  [90 %-96 %] 90 %  BP: ()/() 143/66     Weight: 98 kg (216 lb)  Body mass index is 38.26 kg/m².    Physical Exam   Constitutional: She is oriented to person, place, and time. She appears well-developed and well-nourished. No distress.   HENT:   Head: Normocephalic and atraumatic.   Mouth/Throat: No oropharyngeal exudate.   Eyes: Pupils are equal, round, and reactive to light. Conjunctivae and EOM are normal.   Neck: Normal range of motion. Neck supple.   Cardiovascular: Normal rate, regular rhythm, normal heart sounds and intact distal pulses.   Pulmonary/Chest: Effort normal and breath sounds normal. No respiratory distress. She has no wheezes.   Abdominal: Soft. Bowel sounds are normal. She exhibits no distension. There is no tenderness.   Musculoskeletal: Normal range of motion. She exhibits no edema or tenderness.   RUE larger than LUE   Lymphadenopathy:     She has no cervical adenopathy.   Neurological: She is alert and oriented to person, place, and time. No cranial nerve deficit or sensory deficit. Coordination normal.   Delayed responses, but this is her baseline   Skin: Skin is warm and dry. Capillary refill takes less than 2 seconds. No rash noted.   Psychiatric: She has a normal mood and affect. Her behavior is normal. Judgment and thought content normal.    Nursing note and vitals reviewed.        CRANIAL NERVES     CN III, IV, VI   Pupils are equal, round, and reactive to light.  Extraocular motions are normal.        Significant Labs:   BMP:   Recent Labs   Lab 06/21/19  1340 06/21/19  1558   *  --    *  --    K 6.1* 5.9*   CL 96  --    CO2 23  --    BUN 90*  --    CREATININE 9.6*  --    CALCIUM 9.9  --      CBC:   Recent Labs   Lab 06/21/19  1142   WBC 9.71   HGB 11.9*   HCT 37.8        All pertinent labs within the past 24 hours have been reviewed.    Significant Imaging: I have reviewed all pertinent imaging results/findings within the past 24 hours.    Assessment/Plan:     * Hematuria  Patient presents with hematuria that started yesterday. UA with 3+ RBCs. No vaginal or rectal source noted on exam. CT RSS with no identified etiology.  - daily CBC  - recheck in AM  - Hgb at baseline 11-12  - if still present, consider consulting Urology  - otherwise, outpatient referral    ESRD (end stage renal disease) on dialysis  Essential HTN  HTN urgency    Patient presented with HTN urgency on her HD day.   - Nephrology consulted for HD, completed with 800ml removed  - continue carvedilol 25mg BID  - BP much improved    Partial symptomatic epilepsy with complex partial seizures, not intractable, without status epilepticus  Patient had seizure this morning in setting of HTN urgency and not taking her BP / seizure meds for 1 whole day. Last seizure over 1 year ago.  - resume keppra 500mg BID  - control BP  - at baseline    Swelling of right upper extremity  Thrombosis of AV graft    Patient developed swelling of RUE after declotting procedure yesterday.  at bedside reports improvement in swelling since HD.  - monitor for improvement    Hyperkalemia  K of 6.1 on admission  - HD completed, now 4.7  - recheck in AM    Controlled type 2 diabetes mellitus with chronic kidney disease on chronic dialysis, without long-term current use of insulin  -  continue insulin detemir 10u qhs  - low dose SSI, ACHS    Chronic diastolic CHF (congestive heart failure)  - continue furosemide 40mg daily  - strict Is/Os, daily weights  - stable      VTE Risk Mitigation (From admission, onward)        Ordered     Place LUIS hose  Until discontinued      06/21/19 2025     Place sequential compression device  Until discontinued      06/21/19 2025     IP VTE HIGH RISK PATIENT  Once      06/21/19 2025             TRAV NguyenC  Department of Hospital Medicine   [facility]

## 2019-06-22 NOTE — ASSESSMENT & PLAN NOTE
Thrombosis of AV graft    Patient developed swelling of RUE after declotting procedure yesterday.  at bedside reports improvement in swelling since HD.  - monitor for improvement

## 2019-06-22 NOTE — CONSULTS
[facility]  Nephrology  Consult Note    Patient Name: Lucy Perez  MRN: [id number]  Admission Date: 6/21/2019  Hospital Length of Stay: 0 days  Attending Provider: Karena Mott MD   Primary Care Physician: Beverly Muniz MD  Principal Problem:Hematuria    Inpatient consult to Nephrology  Consult performed by: Dallas Cordero MD  Consult ordered by: Keisha Ybarra Jr., PA-C  Reason for consult: ESRD        Subjective:     HPI: Ms Perez is a 57yo female with a past medical history history of ESRD on HD MWF, HTN, HLD, L hemiplegia after large stroke (2016), T2DM who underwent a declot of her RUE AVG yesterday and now presents to the [facility] ED with hematuria and concern for seizure this morning. She was last dialyzed on Monday. Nephrology has been consulted for management of ESRD and HD while inpatient.         Past Medical History:   Diagnosis Date    Anemia of chronic disease 6/10/2017    Anxiety     Arthritis of right acromioclavicular joint 7/2/2014    Asthma     Bipolar disorder     Bronchitis, acute     Cataract     Cholelithiasis     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    Cortical cataract of both eyes 7/26/2016    Decubitus ulcer of buttock, stage 2     Degeneration of lumbar or lumbosacral intervertebral disc 3/5/2013    S/p MRI L-spine 5/2009     Depression     ESRD on hemodialysis     Started August 2018    General anesthetics causing adverse effect in therapeutic use     Hemorrhagic cerebrovascular accident (CVA)     8/2016 s/p Hemicraniotomy at [facility] with Left hemiparesis    Hemorrhagic cerebrovascular accident (CVA) 1/3/2018    History of stroke 6/28/2017    s/p R-MCA stroke with R-putaminal hemorrhagic transformation in 8/2016 and 11/2016 (s/p hemicraniotomy at [facility]) with residual L hemiparesis, on AED s/p CVA      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    Hyperlipidemia     Hypertensive retinopathy of both eyes  7/26/2016    Impingement syndrome of right shoulder 7/2/2014    Left ventricular diastolic dysfunction with preserved systolic function 12/15/2015    Mixed anxiety and depressive disorder 3/5/2013    Obesity     THERESA (obstructive sleep apnea) 3/5/2013    No Home CPAP 2ndary to cost     Partial symptomatic epilepsy with complex partial seizures, not intractable, without status epilepticus     PEG (percutaneous endoscopic gastrostomy) status 6/28/2017    Renovascular hypertension 3/5/2013    Will resume home medications: amlodipine, labetalol, and hydralazine Will hold Lisinopril in setting of DARLING.   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 without macular edema, without long-term current use of insulin 8/2/2017    Type 2 diabetes mellitus with diabetic polyneuropathy, without long-term current use of insulin 10/22/2015    Type 2 diabetes mellitus with stage 3 chronic kidney disease, without long-term current use of insulin 10/22/2015    Vertebral artery stenosis 3/5/2013    S/p Stenting per Dr Burnett        Past Surgical History:   Procedure Laterality Date    ARTHROSCOPY-SHOULDER WITH SUBACROMIAL DECOMPRESSION Right 7/9/2014    Performed by [facility]ja [facility] at [facility] OR    AV GRAFT CREATION Right 10/18/2018    [facility] Meir Valencia [facility] OR 2ND FLR    Biceps Tenotomy Right 7/9/2014    Per[facility] by [facility]antoja [facility] at [facility] OR    BREAST SURGERY      breast reduction    COLONOSCOPY N/A 8/11/2016    Performed by Jerry Vilchis MD at [facility] ENDO (4TH FLR)    DEBRIDEMENT-SHOULDER-ARTHROSCOPIC Labral Cuff Right 7/9/2014    Performed by Kendall LAMBERT  MD Kit at [facility] OR    DECLOT-GRAFT Right 6/20/2019    Performed by Cabrera Irwin MD at [facility] CATH LAB    ESOPHAGOGASTRODUODENOSCOPY (EGD) N/A 12/6/2017    [facility] Lock Jr., [facility] ENDO    HWKMNSBC-JESGPJWP-WVSMSC END Right 7/9/2014    Performed by Kendall Pantoja MD at [facility] OR    Fistulogram Right 2/11/2019    Performed by Meir Valencia MD at [facility] CATH LAB    HYSTERECTOMY      ROTATOR CUFF REPAIR Right July 9, 2014    right side    Skull surgery      Aneurysm    stent placed      in vertebral artery    TOTAL REDUCTION MAMMOPLASTY      TUBAL LIGATION         Review of patient's allergies indicates:   Allergen Reactions    Lisinopril Other (See Comments)     Angioedema      Vicodin [hydrocodone-acetaminophen] Rash     No problem with acetaminophen      Current Facility-Administered Medications   Medication Frequency    0.9%  NaCl infusion PRN    acetaminophen tablet 650 mg Q4H PRN    aspirin EC tablet 81 mg QAM    atorvastatin tablet 40 mg Daily    buPROPion 24 hr tablet 300 mg Daily    carvedilol tablet 25 mg BID    dextrose 10% (D10W) Bolus PRN    dextrose 10% (D10W) Bolus PRN    famotidine tablet 20 mg Daily    folic acid tablet 1 mg Daily    furosemide tablet 40 mg Daily    glucagon (human recombinant) injection 1 mg PRN    glucose chewable tablet 16 g PRN    glucose chewable tablet 24 g PRN    insulin aspart U-100 pen 0-5 Units QID (AC + HS) PRN    insulin detemir U-100 pen 10 Units QHS    levETIRAcetam tablet 500 mg BID    ondansetron disintegrating tablet 8 mg Q8H PRN    pregabalin capsule 50 mg QHS    sevelamer carbonate tablet 800 mg TID WM    sodium bicarbonate tablet 1,300 mg BID    sodium chloride 0.9% flush 10 mL PRN    traMADol tablet 50 mg Q8H PRN     Family History     Problem Relation (Age of Onset)    Bell's palsy Sister    Blindness Maternal Grandmother    Breast cancer Mother    Cancer Mother (55)    Diabetes Mother,  Son,     Heart attack Mother    Heart disease Mother    Hypertension Mother, Sister    Lupus Sister    No Known Problems Daughter    Sleep apnea Sister    Stroke Sister, Maternal Aunt        Tobacco Use    Smoking status: Never Smoker    Smokeless tobacco: Never Used   Substance and Sexual Activity    Alcohol use: No     Comment: occasional wine cooler     Drug use: No    Sexual activity: Yes     Partners: Male     Review of Systems   Unable to perform ROS: Other     Objective:     Vital Signs (Most Recent):  Temp: 98.4 °F (36.9 °C) (06/22/19 0436)  Pulse: 64 (06/22/19 0436)  Resp: 16 (06/22/19 0436)  BP: 138/70 (06/22/19 0436)  SpO2: (!) 91 % (06/22/19 0436)  O2 Device (Oxygen Therapy): room air (06/21/19 2331) Vital Signs (24h Range):  Temp:  [97.8 °F (36.6 °C)-98.9 °F (37.2 °C)] 98.4 °F (36.9 °C)  Pulse:  [63-75] 64  Resp:  [14-22] 16  SpO2:  [90 %-96 %] 91 %  BP: ()/() 138/70     Weight: 98 kg (216 lb) (06/21/19 0840)  Body mass index is 38.26 kg/m².  Body surface area is 2.09 meters squared.    I/O last 3 completed shifts:  In: 920 [P.O.:220; Other:700]  Out: 1500 [Other:1500]    Physical Exam   Constitutional: She is oriented to person, place, and time.   HENT:   Head: Atraumatic.   Neck: No JVD present.   Cardiovascular: Normal rate and regular rhythm.   Pulmonary/Chest: Effort normal and breath sounds normal.   Abdominal: Soft. She exhibits no distension.   Musculoskeletal: She exhibits no edema or tenderness.   Neurological: She is alert and oriented to person, place, and time.   Skin: Skin is warm.       Significant Labs:  CBC:   Recent Labs   Lab 06/22/19 0618   WBC 7.30   RBC 3.37*   HGB 11.0*   HCT 34.8*      *   MCH 32.6*   MCHC 31.6*     CMP:   Recent Labs   Lab 06/21/19  1340  06/22/19 0618   *   < > 165*   CALCIUM 9.9   < > 9.6   ALBUMIN 3.5  --  3.1*   PROT 8.3  --   --    *   < > 139   K 6.1*   < > 5.1   CO2 23   < > 21*   CL 96   < > 101   BUN 90*   <  > 86*   CREATININE 9.6*   < > 7.4*   ALKPHOS 89  --   --    ALT 16  --   --    *  --   --    BILITOT 1.6*  --   --     < > = values in this interval not displayed.     All labs within the past 24 hours have been reviewed.        Assessment/Plan:     ESRD (end stage renal disease) on dialysis  Outpatient HD Information:    -HD tx days: MWF  -HD tx time: 3-4hrs  -Last HD tx: 2hrs last night  -HD access: RUE AVG (s/p declot)  -HD modality: iHD       Inpatient HD Plan:    -received 2hr HD session last night for volume management and metabolic clearance, BUN still 86 this morning, will do another session this afternoon           Thank you for your consult. I will follow-up with patient. Please contact us if you have any additional questions.    Dallas Cordero MD  Nephrology  [facility]    ATTENDING PHYSICIAN ATTESTATION  I have personally interviewed and examined the patient. I thoroughly reviewed the demographic, clinical, laboratorial and imaging information available in medical records. I agree with the assessment and recommendations provided by the subspecialty resident. Dr. Cordero was under my supervision.     HEMODIALYSIS NOTE  Patient evaluated while undergoing hemodialysis indicated for ESRD. Tolerating session with current UFR, no complications.

## 2019-06-22 NOTE — PROGRESS NOTES
[facility] Hospital Medicine  Progress Note    Patient Name: Lucy Perez  MRN: [id number]  Patient Class: OP- Observation   Admission Date: 6/21/2019  Length of Stay: 0 days  Attending Physician: Karena Mott MD  Primary Care Provider: Beverly Muniz MD    [facility] Medicine Team: [facility] Cayla Rutledge NP    Subjective:     Principal Problem:Hematuria      HPI:  Patient is a 58 y.o. female with history of ESRD HD/MWF, CVA with residual left sided weakness, HTN, diabetes, and rheumatoid arthritis being admitted to observation for hematuria. Family reports 2 episodes of blood on wiping vs toilet. She denies any dysuria, vaginal discharge, hesitancy, but does report urinary frequency in the last 48 hrs. No reported history of nephrolithiasis, vaginal bleeding, abdominal pain or back pain. Family reports patient having a clogged graft which led to ED procedure yesterday. They de clogged it and prior to the procedure, had phlebotomy IV access to left groin region. They also report patient had witnessed seizure of less than a minute today prior to ED arrival.  reports patient did not take any of her medications yesterday because they told her to be NPO. They report the seizure today is typical to previous seizures in the past and assure the patient is herself/baseline. She denies fever, chills or other complaints. She additionally reports non radiating circumferential band like headache, with no associated photophobia or phonophobia which is typical to when she has a seizure. She denies any new dizziness, numbness, weakness, or any other complaints.    In the ED, /105. Intake labs remarkable for K 6.1, tbili 1.6, , UA with 3+ RBCs. CT abdomen with no source of hematuria. Nephrology consulted and HD completed.     Overview/Hospital Course:  Patient admitted to hospital medicine for hematuria and uremia. Patient with hematuria without infection noted on  UA; will repeat with straight cath to ensure bleeding is from bladder and not vagina. Patient to be dialyzed today again as BUN 86 on morning labs. RUQ US pending.     Interval History: Patient states she has ride sided back pain; tender to palpation; RUQ US pending; patient to dialyze today; repeat UA; patient with significant other at bedside; updated on plan of care.     Review of Systems   Constitutional: Negative for activity change, chills and fever.   HENT: Negative for trouble swallowing.    Eyes: Negative for photophobia and visual disturbance.   Respiratory: Negative for chest tightness, shortness of breath and wheezing.    Cardiovascular: Negative for chest pain, palpitations and leg swelling.   Gastrointestinal: Positive for abdominal pain. Negative for abdominal distention, constipation, diarrhea and nausea.   Genitourinary: Positive for hematuria. Negative for dysuria.   Musculoskeletal: Positive for back pain. Negative for neck pain and neck stiffness.   Neurological: Negative for tremors and seizures.   Psychiatric/Behavioral: Negative for agitation and confusion. The patient is not nervous/anxious.      Objective:     Vital Signs (Most Recent):  Temp: 98.7 °F (37.1 °C) (06/22/19 1405)  Pulse: 66 (06/22/19 1600)  Resp: 16 (06/22/19 1530)  BP: (!) 113/46 (06/22/19 1600)  SpO2: (!) 92 % (06/22/19 1138) Vital Signs (24h Range):  Temp:  [97.8 °F (36.6 °C)-98.9 °F (37.2 °C)] 98.7 °F (37.1 °C)  Pulse:  [59-71] 66  Resp:  [14-18] 16  SpO2:  [90 %-96 %] 92 %  BP: ()/() 113/46     Weight: 98 kg (216 lb)  Body mass index is 38.26 kg/m².    Intake/Output Summary (Last 24 hours) at 6/22/2019 1615  Last data filed at 6/22/2019 0500  Gross per 24 hour   Intake 920 ml   Output 1500 ml   Net -580 ml      Physical Exam   Constitutional: She is oriented to person, place, and time. She appears well-developed and well-nourished. No distress.   Cardiovascular: Normal rate, regular rhythm, normal heart sounds  and intact distal pulses.   Pulmonary/Chest: Effort normal and breath sounds normal. No respiratory distress. She has no wheezes.   Abdominal: Soft. Bowel sounds are normal. She exhibits no distension. There is tenderness (diffuse).   Musculoskeletal: Normal range of motion. She exhibits no edema or tenderness.   Neurological: She is alert and oriented to person, place, and time. No cranial nerve deficit or sensory deficit. Coordination normal.   Skin: Skin is warm and dry. Capillary refill takes less than 2 seconds. No rash noted.   Psychiatric: She has a normal mood and affect. Her behavior is normal. Judgment and thought content normal.   Nursing note and vitals reviewed.      Significant Labs:   CBC:   Recent Labs   Lab 06/21/19  1142 06/22/19  0618   WBC 9.71 7.30   HGB 11.9* 11.0*   HCT 37.8 34.8*    181     CMP:   Recent Labs   Lab 06/21/19  1340 06/21/19  1558 06/21/19  2210 06/22/19  0618   *  --  137 139   K 6.1* 5.9* 4.7 5.1   CL 96  --  100 101   CO2 23  --  22* 21*   *  --  241* 165*   BUN 90*  --  78* 86*   CREATININE 9.6*  --  6.8* 7.4*   CALCIUM 9.9  --  9.1 9.6   PROT 8.3  --   --   --    ALBUMIN 3.5  --   --  3.1*   BILITOT 1.6*  --   --   --    ALKPHOS 89  --   --   --    *  --   --   --    ALT 16  --   --   --    ANIONGAP 16  --  15 17*   EGFRNONAA 4.0*  --  6.0* 5.5*       Significant Imaging: I have reviewed all pertinent imaging results/findings within the past 24 hours.      Assessment/Plan:      * Hematuria  Patient presents with hematuria that started yesterday. UA with 3+ RBCs. No vaginal or rectal source noted on exam. CT RSS with no identified etiology.  - daily CBC  - recheck in AM  - Hgb at baseline 11-12  - will repeat to ensure from bladder; likely will follow up outpatient     ESRD (end stage renal disease) on dialysis  Essential HTN  HTN urgency    Patient presented with HTN urgency on her HD day.   - Nephrology consulted for HD, completed with 800ml  removed; to  Dialyze again as BUN 86 on repeat labs   - continue carvedilol 25mg BID  - BP much improved    Cholelithiasis  - gallbadder wall thickening associated  - US pending      Swelling of right upper extremity  Thrombosis of AV graft    Patient developed swelling of RUE after declotting procedure yesterday.  at bedside reports improvement in swelling since HD.  - monitor for improvement    Hyperkalemia  K of 6.1 on admission  - HD completed, now 4.7  - recheck in AM    Hemorrhagic cerebrovascular accident (CVA)  - continue ASA  - continue atorvastatin 40mg daily  - wheelchair bound    Controlled type 2 diabetes mellitus with chronic kidney disease on chronic dialysis, without long-term current use of insulin  - continue insulin detemir 10u qhs  - low dose SSI, ACHS    Constipation  Stool burden seen on CT  - mag citrate ordered    Partial symptomatic epilepsy with complex partial seizures, not intractable, without status epilepticus  Patient had seizure this morning in setting of HTN urgency and not taking her BP / seizure meds for 1 whole day. Last seizure over 1 year ago.  - resume keppra 500mg BID  - control BP  - at baseline    Chronic diastolic CHF (congestive heart failure)  - continue furosemide 40mg daily  - strict Is/Os, daily weights  - stable      VTE Risk Mitigation (From admission, onward)        Ordered     Place LUIS hose  Until discontinued      06/21/19 2025     Place sequential compression device  Until discontinued      06/21/19 2025     IP VTE HIGH RISK PATIENT  Once      06/21/19 2025                Cayla Rutledge NP  Department of Hospital Medicine   [facility]

## 2019-06-22 NOTE — SUBJECTIVE & OBJECTIVE
Interval History: Patient states she has ride sided back pain; tender to palpation; RUQ US pending; patient to dialyze today; repeat UA; patient with significant other at bedside; updated on plan of care.     Review of Systems   Constitutional: Negative for activity change, chills and fever.   HENT: Negative for trouble swallowing.    Eyes: Negative for photophobia and visual disturbance.   Respiratory: Negative for chest tightness, shortness of breath and wheezing.    Cardiovascular: Negative for chest pain, palpitations and leg swelling.   Gastrointestinal: Positive for abdominal pain. Negative for abdominal distention, constipation, diarrhea and nausea.   Genitourinary: Positive for hematuria. Negative for dysuria.   Musculoskeletal: Positive for back pain. Negative for neck pain and neck stiffness.   Neurological: Negative for tremors and seizures.   Psychiatric/Behavioral: Negative for agitation and confusion. The patient is not nervous/anxious.      Objective:     Vital Signs (Most Recent):  Temp: 98.7 °F (37.1 °C) (06/22/19 1405)  Pulse: 66 (06/22/19 1600)  Resp: 16 (06/22/19 1530)  BP: (!) 113/46 (06/22/19 1600)  SpO2: (!) 92 % (06/22/19 1138) Vital Signs (24h Range):  Temp:  [97.8 °F (36.6 °C)-98.9 °F (37.2 °C)] 98.7 °F (37.1 °C)  Pulse:  [59-71] 66  Resp:  [14-18] 16  SpO2:  [90 %-96 %] 92 %  BP: ()/() 113/46     Weight: 98 kg (216 lb)  Body mass index is 38.26 kg/m².    Intake/Output Summary (Last 24 hours) at 6/22/2019 1615  Last data filed at 6/22/2019 0500  Gross per 24 hour   Intake 920 ml   Output 1500 ml   Net -580 ml      Physical Exam   Constitutional: She is oriented to person, place, and time. She appears well-developed and well-nourished. No distress.   Cardiovascular: Normal rate, regular rhythm, normal heart sounds and intact distal pulses.   Pulmonary/Chest: Effort normal and breath sounds normal. No respiratory distress. She has no wheezes.   Abdominal: Soft. Bowel sounds are  normal. She exhibits no distension. There is tenderness (diffuse).   Musculoskeletal: Normal range of motion. She exhibits no edema or tenderness.   Neurological: She is alert and oriented to person, place, and time. No cranial nerve deficit or sensory deficit. Coordination normal.   Skin: Skin is warm and dry. Capillary refill takes less than 2 seconds. No rash noted.   Psychiatric: She has a normal mood and affect. Her behavior is normal. Judgment and thought content normal.   Nursing note and vitals reviewed.      Significant Labs:   CBC:   Recent Labs   Lab 06/21/19  1142 06/22/19  0618   WBC 9.71 7.30   HGB 11.9* 11.0*   HCT 37.8 34.8*    181     CMP:   Recent Labs   Lab 06/21/19  1340 06/21/19  1558 06/21/19  2210 06/22/19  0618   *  --  137 139   K 6.1* 5.9* 4.7 5.1   CL 96  --  100 101   CO2 23  --  22* 21*   *  --  241* 165*   BUN 90*  --  78* 86*   CREATININE 9.6*  --  6.8* 7.4*   CALCIUM 9.9  --  9.1 9.6   PROT 8.3  --   --   --    ALBUMIN 3.5  --   --  3.1*   BILITOT 1.6*  --   --   --    ALKPHOS 89  --   --   --    *  --   --   --    ALT 16  --   --   --    ANIONGAP 16  --  15 17*   EGFRNONAA 4.0*  --  6.0* 5.5*       Significant Imaging: I have reviewed all pertinent imaging results/findings within the past 24 hours.

## 2019-06-22 NOTE — PROGRESS NOTES
GEORGIA Calderon and nephrology MD made aware that nurse does not hear or feel bruit or thrill on pt's CAROLE AV fistula and pt verbalized to nurse and MD that morning that pt had outpatient dialysis last night with no complication. Nephrologist, Dr. Cordero, said he will get vascular surgery team to follow pt.

## 2019-06-22 NOTE — ASSESSMENT & PLAN NOTE
Essential HTN  HTN urgency    Patient presented with HTN urgency on her HD day.   - Nephrology consulted for HD, completed with 800ml removed; to  Dialyze again as BUN 86 on repeat labs   - continue carvedilol 25mg BID  - BP much improved

## 2019-06-22 NOTE — SUBJECTIVE & OBJECTIVE
Past Medical History:   Diagnosis Date    Anemia of chronic disease 6/10/2017    Anxiety     Arthritis of right acromioclavicular joint 7/2/2014    Asthma     Bipolar disorder     Bronchitis, acute     Cataract     Cholelithiasis     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    Cortical cataract of both eyes 7/26/2016    Decubitus ulcer of buttock, stage 2     Degeneration of lumbar or lumbosacral intervertebral disc 3/5/2013    S/p MRI L-spine 5/2009     Depression     ESRD on hemodialysis     Started August 2018    General anesthetics causing adverse effect in therapeutic use     Hemorrhagic cerebrovascular accident (CVA)     [place] [facility]    Hemorrhagic cerebrovascular accident (CVA) 1/3/2018    History of stroke 6/28/2017    s/p R-MCA stroke with R-putaminal hemorrhagic transformation in 8/2016 and 11/2016 ([place] [facility] with residual L hemiparesis, on AED s/p CVA      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    Hyperlipidemia     Hypertensive retinopathy of both eyes 7/26/2016    Impingement syndrome of right shoulder 7/2/2014    Left ventricular diastolic dysfunction with preserved systolic function 12/15/2015    Mixed anxiety and depressive disorder 3/5/2013    Obesity     THERESA (obstructive sleep apnea) 3/5/2013    No Home CPAP 2ndary to cost     Partial symptomatic epilepsy with complex partial seizures, not intractable, without status epilepticus     PEG (percutaneous endoscopic gastrostomy) status 6/28/2017    Renovascular hypertension 3/5/2013    Will resume home medications: amlodipine, labetalol, and hydralazine Will hold Lisinopril in setting of DARLING.    Rheumatoid arthritis(714.0)     Rotator cuff tear 7/2/2014    S/P breast biopsy, left 3/5/2013    Benign Breast Bx     S/P R shoulder surgery, SAD, DCE, biceps tenotomy 7/15/2014    S/P total hysterectomy 7/10/2013    Sarcoidosis     Stroke 2016     left sided flaccidity, SAH    Type 2 diabetes mellitus with both eyes affected by moderate nonproliferative retinopathy without macular edema, without long-term current use of insulin 8/2/2017    Type 2 diabetes mellitus with diabetic polyneuropathy, without long-term current use of insulin 10/22/2015    Type 2 diabetes mellitus with stage 3 chronic kidney disease, without long-term current use of insulin 10/22/2015    Vertebral artery stenosis 3/5/2013    S/p Stenting per Dr Burnett        Past Surgical History:   Procedure Laterality Date    ARTHROSCOPY-SHOULDER WITH SUBACROMIAL DECOMPRESSION Right 7/9/2014    Performed by [facility] at [facility]    AV GRAFT CREATION Right 10/18/2018    Performed by Meir Valencia [facility] at [facility] OR 2ND FLR    Biceps Tenotomy Right 7/9/2014    Performed by Kendall [facility]antoja [facility] at [facility] OR    BREAST SURGERY      breast reduction    COLONOSCOPY N/A 8/11/2016    Performed by Jerry Vilchis MD at [facility] ENDO (4TH FLR)    DEBRIDEMENT-SHOULDER-ARTHROSCOPIC Labral Cuff Right 7/9/2014    Performed by Kendall [facility]antoja [facility] at [facility] OR    DECLOT-GRAFT Right 6/20/2019    Performed by Cabrera Irwin MD at [facility] CATH LAB    ESOPHAGOGASTRODUODENOSCOPY (EGD) N/A 12/6/2017    [facility] by Dallas Lock Jr[facility] END[facility]    WOKJOXLR-NOTTNOJC-DMKDLB END Right 7/9/2014    Performed by Kendall [facility]antoja MD at [facility] OR    Fistulogram Right 2/11/2019    Performed by Meir Valencia MD at [facility] CATH LAB    HYSTERECTOMY      ROTATOR CUFF REPAIR Right July 9, 2014    right side    Skull surgery      Aneurysm    stent placed      in vertebral artery    TOTAL REDUCTION MAMMOPLASTY      TUBAL LIGATION         Review of patient's allergies indicates:   Allergen Reactions    Lisinopril Other (See Comments)     Angioedema      Vicodin [hydrocodone-acetaminophen] Rash     No problem with acetaminophen      Current Facility-Administered Medications   Medication  Frequency    0.9%  NaCl infusion PRN    acetaminophen tablet 650 mg Q4H PRN    aspirin EC tablet 81 mg QAM    atorvastatin tablet 40 mg Daily    buPROPion 24 hr tablet 300 mg Daily    carvedilol tablet 25 mg BID    dextrose 10% (D10W) Bolus PRN    dextrose 10% (D10W) Bolus PRN    famotidine tablet 20 mg Daily    folic acid tablet 1 mg Daily    furosemide tablet 40 mg Daily    glucagon (human recombinant) injection 1 mg PRN    glucose chewable tablet 16 g PRN    glucose chewable tablet 24 g PRN    insulin aspart U-100 pen 0-5 Units QID (AC + HS) PRN    insulin detemir U-100 pen 10 Units QHS    levETIRAcetam tablet 500 mg BID    ondansetron disintegrating tablet 8 mg Q8H PRN    pregabalin capsule 50 mg QHS    sevelamer carbonate tablet 800 mg TID WM    sodium bicarbonate tablet 1,300 mg BID    sodium chloride 0.9% flush 10 mL PRN    traMADol tablet 50 mg Q8H PRN     Family History     Problem Relation (Age of Onset)    Bell's palsy Sister    Blindness Maternal Grandmother    Breast cancer Mother    Cancer Mother (55)    Diabetes Mother, Son,     Heart attack Mother    Heart disease Mother    Hypertension Mother, Sister    Lupus Sister    No Known Problems Daughter    Sleep apnea Sister    Stroke Sister, Maternal Aunt        Tobacco Use    Smoking status: Never Smoker    Smokeless tobacco: Never Used   Substance and Sexual Activity    Alcohol use: No     Comment: occasional wine cooler     Drug use: No    Sexual activity: Yes     Partners: Male     Review of Systems   Unable to perform ROS: Other     Objective:     Vital Signs (Most Recent):  Temp: 98.4 °F (36.9 °C) (06/22/19 0436)  Pulse: 64 (06/22/19 0436)  Resp: 16 (06/22/19 0436)  BP: 138/70 (06/22/19 0436)  SpO2: (!) 91 % (06/22/19 0436)  O2 Device (Oxygen Therapy): room air (06/21/19 2331) Vital Signs (24h Range):  Temp:  [97.8 °F (36.6 °C)-98.9 °F (37.2 °C)] 98.4 °F (36.9 °C)  Pulse:  [63-75] 64  Resp:  [14-22] 16  SpO2:  [90 %-96 %]  91 %  BP: ()/() 138/70     Weight: 98 kg (216 lb) (06/21/19 0840)  Body mass index is 38.26 kg/m².  Body surface area is 2.09 meters squared.    I/O last 3 completed shifts:  In: 920 [P.O.:220; Other:700]  Out: 1500 [Other:1500]    Physical Exam   Constitutional: She is oriented to person, place, and time.   HENT:   Head: Atraumatic.   Neck: No JVD present.   Cardiovascular: Normal rate and regular rhythm.   Pulmonary/Chest: Effort normal and breath sounds normal.   Abdominal: Soft. She exhibits no distension.   Musculoskeletal: She exhibits no edema or tenderness.   Neurological: She is alert and oriented to person, place, and time.   Skin: Skin is warm.       Significant Labs:  CBC:   Recent Labs   Lab 06/22/19  0618   WBC 7.30   RBC 3.37*   HGB 11.0*   HCT 34.8*      *   MCH 32.6*   MCHC 31.6*     CMP:   Recent Labs   Lab 06/21/19  1340  06/22/19  0618   *   < > 165*   CALCIUM 9.9   < > 9.6   ALBUMIN 3.5  --  3.1*   PROT 8.3  --   --    *   < > 139   K 6.1*   < > 5.1   CO2 23   < > 21*   CL 96   < > 101   BUN 90*   < > 86*   CREATININE 9.6*   < > 7.4*   ALKPHOS 89  --   --    ALT 16  --   --    *  --   --    BILITOT 1.6*  --   --     < > = values in this interval not displayed.     All labs within the past 24 hours have been reviewed.

## 2019-06-23 VITALS
DIASTOLIC BLOOD PRESSURE: 56 MMHG | BODY MASS INDEX: 37.89 KG/M2 | OXYGEN SATURATION: 95 % | TEMPERATURE: 98 F | WEIGHT: 213.88 LBS | HEIGHT: 63 IN | HEART RATE: 76 BPM | RESPIRATION RATE: 20 BRPM | SYSTOLIC BLOOD PRESSURE: 118 MMHG

## 2019-06-23 LAB
ALBUMIN SERPL BCP-MCNC: 3 G/DL (ref 3.5–5.2)
ANION GAP SERPL CALC-SCNC: 15 MMOL/L (ref 8–16)
BASOPHILS # BLD AUTO: 0.04 K/UL (ref 0–0.2)
BASOPHILS NFR BLD: 0.6 % (ref 0–1.9)
BUN SERPL-MCNC: 57 MG/DL (ref 6–20)
CALCIUM SERPL-MCNC: 8.3 MG/DL (ref 8.7–10.5)
CHLORIDE SERPL-SCNC: 101 MMOL/L (ref 95–110)
CK SERPL-CCNC: 99 U/L (ref 20–180)
CO2 SERPL-SCNC: 24 MMOL/L (ref 23–29)
CREAT SERPL-MCNC: 6.4 MG/DL (ref 0.5–1.4)
DIFFERENTIAL METHOD: ABNORMAL
EOSINOPHIL # BLD AUTO: 0.2 K/UL (ref 0–0.5)
EOSINOPHIL NFR BLD: 3.3 % (ref 0–8)
ERYTHROCYTE [DISTWIDTH] IN BLOOD BY AUTOMATED COUNT: 13.3 % (ref 11.5–14.5)
EST. GFR  (AFRICAN AMERICAN): 7.5 ML/MIN/1.73 M^2
EST. GFR  (NON AFRICAN AMERICAN): 6.5 ML/MIN/1.73 M^2
GLUCOSE SERPL-MCNC: 162 MG/DL (ref 70–110)
HCT VFR BLD AUTO: 33.4 % (ref 37–48.5)
HGB BLD-MCNC: 10.3 G/DL (ref 12–16)
IMM GRANULOCYTES # BLD AUTO: 0.02 K/UL (ref 0–0.04)
IMM GRANULOCYTES NFR BLD AUTO: 0.3 % (ref 0–0.5)
LYMPHOCYTES # BLD AUTO: 0.8 K/UL (ref 1–4.8)
LYMPHOCYTES NFR BLD: 13.4 % (ref 18–48)
MAGNESIUM SERPL-MCNC: 2.4 MG/DL (ref 1.6–2.6)
MCH RBC QN AUTO: 33.3 PG (ref 27–31)
MCHC RBC AUTO-ENTMCNC: 30.8 G/DL (ref 32–36)
MCV RBC AUTO: 108 FL (ref 82–98)
MONOCYTES # BLD AUTO: 0.7 K/UL (ref 0.3–1)
MONOCYTES NFR BLD: 11.4 % (ref 4–15)
NEUTROPHILS # BLD AUTO: 4.5 K/UL (ref 1.8–7.7)
NEUTROPHILS NFR BLD: 71 % (ref 38–73)
NRBC BLD-RTO: 0 /100 WBC
PHOSPHATE SERPL-MCNC: 5 MG/DL (ref 2.7–4.5)
PLATELET # BLD AUTO: 165 K/UL (ref 150–350)
PMV BLD AUTO: 10.9 FL (ref 9.2–12.9)
POCT GLUCOSE: 154 MG/DL (ref 70–110)
POCT GLUCOSE: 192 MG/DL (ref 70–110)
POTASSIUM SERPL-SCNC: 4.5 MMOL/L (ref 3.5–5.1)
RBC # BLD AUTO: 3.09 M/UL (ref 4–5.4)
SODIUM SERPL-SCNC: 140 MMOL/L (ref 136–145)
WBC # BLD AUTO: 6.29 K/UL (ref 3.9–12.7)

## 2019-06-23 PROCEDURE — 80069 RENAL FUNCTION PANEL: CPT | Mod: HCNC,NTX

## 2019-06-23 PROCEDURE — 99217 PR OBSERVATION CARE DISCHARGE: ICD-10-PCS | Mod: HCNC,NTX,, | Performed by: NURSE PRACTITIONER

## 2019-06-23 PROCEDURE — G0378 HOSPITAL OBSERVATION PER HR: HCPCS | Mod: HCNC,NTX

## 2019-06-23 PROCEDURE — 99217 PR OBSERVATION CARE DISCHARGE: CPT | Mod: HCNC,NTX,, | Performed by: NURSE PRACTITIONER

## 2019-06-23 PROCEDURE — 85025 COMPLETE CBC W/AUTO DIFF WBC: CPT | Mod: HCNC,NTX

## 2019-06-23 PROCEDURE — 25000003 PHARM REV CODE 250: Mod: HCNC,NTX | Performed by: PHYSICIAN ASSISTANT

## 2019-06-23 PROCEDURE — 83735 ASSAY OF MAGNESIUM: CPT | Mod: HCNC,NTX

## 2019-06-23 PROCEDURE — 82550 ASSAY OF CK (CPK): CPT | Mod: HCNC,NTX

## 2019-06-23 PROCEDURE — 36415 COLL VENOUS BLD VENIPUNCTURE: CPT | Mod: HCNC,NTX

## 2019-06-23 RX ADMIN — SEVELAMER CARBONATE 800 MG: 800 TABLET, FILM COATED ORAL at 12:06

## 2019-06-23 RX ADMIN — ATORVASTATIN CALCIUM 40 MG: 20 TABLET, FILM COATED ORAL at 09:06

## 2019-06-23 RX ADMIN — FAMOTIDINE 20 MG: 20 TABLET, FILM COATED ORAL at 09:06

## 2019-06-23 RX ADMIN — FOLIC ACID 1 MG: 1 TABLET ORAL at 09:06

## 2019-06-23 RX ADMIN — TRAMADOL HYDROCHLORIDE 50 MG: 50 TABLET, FILM COATED ORAL at 06:06

## 2019-06-23 RX ADMIN — LEVETIRACETAM 500 MG: 500 TABLET ORAL at 09:06

## 2019-06-23 RX ADMIN — FUROSEMIDE 40 MG: 40 TABLET ORAL at 09:06

## 2019-06-23 RX ADMIN — CARVEDILOL 25 MG: 25 TABLET, FILM COATED ORAL at 09:06

## 2019-06-23 RX ADMIN — SODIUM BICARBONATE 650 MG TABLET 1300 MG: at 09:06

## 2019-06-23 RX ADMIN — BUPROPION HYDROCHLORIDE 300 MG: 300 TABLET, FILM COATED, EXTENDED RELEASE ORAL at 09:06

## 2019-06-23 NOTE — NURSING
IV removed.  Bleeding controlled.  All belongings gathered by .  Discharge papers explained and given to pt.  Pt verbalizes understanding.  Awaiting ride home.

## 2019-06-23 NOTE — PLAN OF CARE
Problem: Adult Inpatient Plan of Care  Goal: Plan of Care Review  Outcome: Ongoing (interventions implemented as appropriate)  Pt free of falls and injury. Pt AAOx4. Lethargic after dialysis.Denies pain during the shift.Bed low, breaks locked, SR up x2, call light within reach,  at bedside,will continue to monitor.

## 2019-06-24 NOTE — ASSESSMENT & PLAN NOTE
Patient presents with hematuria that started yesterday. UA with 3+ RBCs. No vaginal or rectal source noted on exam. CT RSS with no identified etiology.  - daily CBC  - recheck in AM  - Hgb at baseline 11-12  - will repeat to ensure from bladder; likely will follow up outpatient; referral placed

## 2019-06-24 NOTE — DISCHARGE SUMMARY
[facility] Hospital Medicine  Discharge Summary      Patient Name: Lucy Perez  MRN: [id number]  Admission Date: 6/21/2019  Hospital Length of Stay: 0 days  Discharge Date and Time: 6/23/2019  5:26 PM  Attending Physician: No att. providers found   Discharging Provider: Cayla Rutledge NP  Primary Care Provider: Beverly Muniz MD  [facility] Medicine Team: [facility]OSP MED F Cayla Rutledge NP    HPI:   Patient is a 58 y.o. female with history of ESRD HD/MWF, CVA with residual left sided weakness, HTN, diabetes, and rheumatoid arthritis being admitted to observation for hematuria. Family reports 2 episodes of blood on wiping vs toilet. She denies any dysuria, vaginal discharge, hesitancy, but does report urinary frequency in the last 48 hrs. No reported history of nephrolithiasis, vaginal bleeding, abdominal pain or back pain. Family reports patient having a clogged graft which led to ED procedure yesterday. They de clogged it and prior to the procedure, had phlebotomy IV access to left groin region. They also report patient had witnessed seizure of less than a minute today prior to ED arrival.  reports patient did not take any of her medications yesterday because they told her to be NPO. They report the seizure today is typical to previous seizures in the past and assure the patient is herself/baseline. She denies fever, chills or other complaints. She additionally reports non radiating circumferential band like headache, with no associated photophobia or phonophobia which is typical to when she has a seizure. She denies any new dizziness, numbness, weakness, or any other complaints.    In the ED, /105. Intake labs remarkable for K 6.1, tbili 1.6, , UA with 3+ RBCs. CT abdomen with no source of hematuria. Nephrology consulted and HD completed.     * No surgery found *      Hospital Course:   Patient admitted to hospital medicine for hematuria and uremia. Patient  with hematuria without infection noted on UA; will repeat with straight cath to ensure bleeding is from bladder and not vagina. Patient to be dialyzed today again as BUN 86 on morning labs. After second dialysis patient BUN improved greatly. RUQ US with acute process. Patient discharge with urology consult.      Consults:   Consults (From admission, onward)        Status Ordering Provider     Inpatient consult to Nephrology  Once     Provider:  (Not yet assigned)    Completed REGAN SAAB JR     Inpatient consult to Vascular Surgery  Once     Provider:  (Not yet assigned)    Completed DONAVAN BRYANT          * Hematuria  Patient presents with hematuria that started yesterday. UA with 3+ RBCs. No vaginal or rectal source noted on exam. CT RSS with no identified etiology.  - daily CBC  - recheck in AM  - Hgb at baseline 11-12  - will repeat to ensure from bladder; likely will follow up outpatient; referral placed     ESRD (end stage renal disease) on dialysis  Essential HTN  HTN urgency    Patient presented with HTN urgency on her HD day.   - Nephrology consulted for HD, completed with 800ml removed; to  Dialyze again as BUN 86 on repeat labs   - continue carvedilol 25mg BID  - BP much improved    Cholelithiasis  - gallbadder wall thickening associated  - US without cholecystitis       Swelling of right upper extremity  Thrombosis of AV graft    Patient developed swelling of RUE after declotting procedure yesterday.  at bedside reports improvement in swelling since HD.  - monitor for improvement  - patient able to dialyze    Hyperkalemia  K of 6.1 on admission  - HD completed, now 4.7    Hemorrhagic cerebrovascular accident (CVA)  - continue ASA  - continue atorvastatin 40mg daily  - wheelchair bound    Controlled type 2 diabetes mellitus with chronic kidney disease on chronic dialysis, without long-term current use of insulin  - continue insulin detemir 10u qhs  - low dose SSI,  ACHS    Constipation  Stool burden seen on CT  - mag citrate ordered    Partial symptomatic epilepsy with complex partial seizures, not intractable, without status epilepticus  Patient had seizure this morning in setting of HTN urgency and not taking her BP / seizure meds for 1 whole day. Last seizure over 1 year ago.  - resume keppra 500mg BID  - control BP  - at baseline    Chronic diastolic CHF (congestive heart failure)  - continue furosemide 40mg daily  - strict Is/Os, daily weights  - stable      Final Active Diagnoses:    Diagnosis Date Noted POA    PRINCIPAL PROBLEM:  Hematuria [R31.9] 06/21/2019 Yes    ESRD (end stage renal disease) on dialysis [N18.6, Z99.2] 08/11/2018 Not Applicable    Cholelithiasis [K80.20] 06/22/2019 Yes    Problem with dialysis access [T82.898A]  Yes    Hyperkalemia [E87.5] 06/21/2019 Yes    Swelling of right upper extremity [M79.89] 06/21/2019 Yes    Thrombosis of arteriovenous graft [T82.868A] 06/20/2019 Yes    Hypertensive urgency [I16.0] 08/10/2018 Yes    Hemorrhagic cerebrovascular accident (CVA) [I61.9] 01/03/2018 Yes    Essential hypertension [I10] 08/10/2017 Yes    Controlled type 2 diabetes mellitus with chronic kidney disease on chronic dialysis, without long-term current use of insulin [E11.22, N18.6, Z99.2] 08/02/2017 Not Applicable    Constipation [K59.00] 06/16/2017 Yes    Partial symptomatic epilepsy with complex partial seizures, not intractable, without status epilepticus [G40.209] 06/06/2017 Yes    Chronic diastolic CHF (congestive heart failure) [I50.32] 12/15/2015 Yes      Problems Resolved During this Admission:       Discharged Condition: good    Disposition: Home or Self Care    Follow Up:    Patient Instructions:      Ambulatory Referral to Urology   Referral Priority: Routine Referral Type: Consultation   Referral Reason: Specialty Services Required   Requested Specialty: Urology   Number of Visits Requested: 1     Notify your health care  provider if you experience any of the following:  temperature >100.4     Notify your health care provider if you experience any of the following:  increased confusion or weakness     Notify your health care provider if you experience any of the following:  persistent dizziness, light-headedness, or visual disturbances     Notify your health care provider if you experience any of the following:  redness, tenderness, or signs of infection (pain, swelling, redness, odor or green/yellow discharge around incision site)     Activity as tolerated       Significant Diagnostic Studies: Labs: All labs within the past 24 hours have been reviewed    Pending Diagnostic Studies:     None         Medications:  Reconciled Home Medications:      Medication List      CHANGE how you take these medications    ergocalciferol 50,000 unit Cap  Commonly known as:  ERGOCALCIFEROL  1 capsule (50,000 Units total) by Per G Tube route every 7 days.  What changed:    · how to take this  · additional instructions     ferrous sulfate 220 mg (44 mg iron)/5 mL solution  10ml daily for Iron/Give via PEG  What changed:    · how much to take  · how to take this  · when to take this  · additional instructions     furosemide 40 MG tablet  Commonly known as:  LASIX  Take 1 tablet (40 mg total) by mouth once daily.  What changed:  when to take this     insulin glargine 100 unit/mL injection  Commonly known as:  LANTUS U-100 INSULIN  10 units daily in the PM  What changed:  Another medication with the same name was removed. Continue taking this medication, and follow the directions you see here.        CONTINUE taking these medications    acetaminophen 325 MG tablet  Commonly known as:  TYLENOL  Take 2 tablets (650 mg total) by mouth every 6 (six) hours as needed for Pain.     albuterol 2.5 mg /3 mL (0.083 %) nebulizer solution  Commonly known as:  PROVENTIL  Take 3 mLs (2.5 mg total) by nebulization every 6 (six) hours as needed for Wheezing. Rescue    "  ALPRAZolam 0.5 MG tablet  Commonly known as:  XANAX  1 tab Every 8 hours as needed for anxiety     aspirin 81 MG EC tablet  Commonly known as:  ECOTRIN  Take 81 mg by mouth every morning.     atorvastatin 40 MG tablet  Commonly known as:  LIPITOR  TAKE 1 TABLET ONE TIME DAILY FOR CHOLESTEROL     BD ULTRA-FINE SHORT PEN NEEDLE 31 gauge x 5/16" Ndle  Generic drug:  pen needle, diabetic  USE TO INJECT NOVOLOG FLEXPEN BEFORE MEALS     blood sugar diagnostic Strp  Commonly known as:  ACCU-CHEK SMARTVIEW TEST STRIP  1 strip by Misc.(Non-Drug; Combo Route) route 2 (two) times daily.     buPROPion 300 MG 24 hr tablet  Commonly known as:  WELLBUTRIN XL  Take 1 tablet (300 mg total) by mouth once daily.     carvedilol 25 MG tablet  Commonly known as:  COREG  Take 1 tablet (25 mg total) by mouth 2 (two) times daily.     dantrolene 50 MG Cap  Commonly known as:  DANTRIUM  Take 1 capsule (50 mg total) by mouth 3 (three) times daily.     DULCOLAX (BISACODYL) ORAL  Take by mouth as needed (constipation).     famotidine 20 MG tablet  Commonly known as:  PEPCID  Take 1 tablet (20 mg total) by mouth once daily.     flash glucose scanning reader Misc  Commonly known as:  FREESTYLE JOSÉ LUIS 14 DAY READER  Use as directed. Scan 4 times a day.     flash glucose sensor Kit  Commonly known as:  FREESTYLE JOSÉ LUIS 14 DAY SENSOR  Change every 14 days.     folic acid 1 MG tablet  Commonly known as:  FOLVITE  Take 1 tablet (1 mg total) by mouth once daily.     levETIRAcetam 500 MG Tab  Commonly known as:  KEPPRA  Take 1 tablet (500 mg total) by mouth every 8 (eight) hours.     lidocaine-prilocaine cream  Commonly known as:  EMLA  Apply topically as needed.     MIRALAX ORAL  Take by mouth every evening.     pregabalin 25 MG capsule  Commonly known as:  LYRICA  1 cap in AM and 2 caps at Bedtime for muscle spasm     sevelamer carbonate 800 mg Tab  Commonly known as:  RENVELA  Take 800 mg by mouth 3 (three) times daily with meals.     sodium " bicarbonate 650 MG tablet  Take 2 tablets (1,300 mg total) by mouth 2 (two) times daily.     traMADol 50 mg tablet  Commonly known as:  ULTRAM  1 tab Q6-8 hours as needed for joint pain            Indwelling Lines/Drains at time of discharge:   Lines/Drains/Airways     Central Venous Catheter Line                 Hemodialysis Catheter right subclavian -- days          Drain                 Hemodialysis AV Graft Right upper arm -- days         Sheath Right medial -- days         Hemodialysis AV Graft 10/18/18 1010 Right upper arm 249 days                Time spent on the discharge of patient: >30 minutes  Patient was seen and examined on the date of discharge and determined to be suitable for discharge.         Cayla Rutledge NP  Department of Hospital Medicine  [facility]

## 2019-06-24 NOTE — ASSESSMENT & PLAN NOTE
Thrombosis of AV graft    Patient developed swelling of RUE after declotting procedure yesterday.  at bedside reports improvement in swelling since HD.  - monitor for improvement  - patient able to dialyze

## 2019-06-25 ENCOUNTER — TELEPHONE (OUTPATIENT)
Dept: INTERNAL MEDICINE | Facility: CLINIC | Age: 59
End: 2019-06-25

## 2019-06-25 DIAGNOSIS — Z99.2 TYPE 2 DIABETES MELLITUS WITH CHRONIC KIDNEY DISEASE ON CHRONIC DIALYSIS, WITH LONG-TERM CURRENT USE OF INSULIN: ICD-10-CM

## 2019-06-25 DIAGNOSIS — E78.5 HYPERLIPIDEMIA, UNSPECIFIED HYPERLIPIDEMIA TYPE: ICD-10-CM

## 2019-06-25 DIAGNOSIS — N18.6 TYPE 2 DIABETES MELLITUS WITH CHRONIC KIDNEY DISEASE ON CHRONIC DIALYSIS, WITH LONG-TERM CURRENT USE OF INSULIN: ICD-10-CM

## 2019-06-25 DIAGNOSIS — H10.9 CONJUNCTIVITIS, UNSPECIFIED CONJUNCTIVITIS TYPE, UNSPECIFIED LATERALITY: ICD-10-CM

## 2019-06-25 DIAGNOSIS — G40.909 SEIZURE DISORDER: ICD-10-CM

## 2019-06-25 DIAGNOSIS — R09.89 CHEST CONGESTION: ICD-10-CM

## 2019-06-25 DIAGNOSIS — R31.9 HEMATURIA, UNSPECIFIED TYPE: ICD-10-CM

## 2019-06-25 DIAGNOSIS — E11.22 TYPE 2 DIABETES MELLITUS WITH CHRONIC KIDNEY DISEASE ON CHRONIC DIALYSIS, WITH LONG-TERM CURRENT USE OF INSULIN: ICD-10-CM

## 2019-06-25 DIAGNOSIS — Z79.4 TYPE 2 DIABETES MELLITUS WITH CHRONIC KIDNEY DISEASE ON CHRONIC DIALYSIS, WITH LONG-TERM CURRENT USE OF INSULIN: ICD-10-CM

## 2019-06-25 DIAGNOSIS — I10 ESSENTIAL HYPERTENSION: ICD-10-CM

## 2019-06-25 DIAGNOSIS — J32.9 SINUSITIS, UNSPECIFIED CHRONICITY, UNSPECIFIED LOCATION: Primary | ICD-10-CM

## 2019-06-25 RX ORDER — GUAIFENESIN 1200 MG/1
TABLET, EXTENDED RELEASE ORAL
Qty: 24 TABLET | Refills: 0 | Status: SHIPPED | OUTPATIENT
Start: 2019-06-25 | End: 2019-07-23

## 2019-06-25 RX ORDER — CIPROFLOXACIN HYDROCHLORIDE 3 MG/ML
2 SOLUTION/ DROPS OPHTHALMIC
Qty: 5 ML | Refills: 0 | Status: SHIPPED | OUTPATIENT
Start: 2019-06-25 | End: 2019-07-23

## 2019-06-25 RX ORDER — ALBUTEROL SULFATE 0.83 MG/ML
2.5 SOLUTION RESPIRATORY (INHALATION) EVERY 6 HOURS PRN
Qty: 25 EACH | Refills: 3 | Status: ON HOLD | OUTPATIENT
Start: 2019-06-25 | End: 2019-10-08 | Stop reason: HOSPADM

## 2019-06-25 RX ORDER — ALBUTEROL SULFATE 90 UG/1
2 AEROSOL, METERED RESPIRATORY (INHALATION) EVERY 6 HOURS PRN
Qty: 18 G | Refills: 0 | Status: SHIPPED | OUTPATIENT
Start: 2019-06-25 | End: 2020-07-31

## 2019-06-25 RX ORDER — AMOXICILLIN AND CLAVULANATE POTASSIUM 500; 125 MG/1; MG/1
1 TABLET, FILM COATED ORAL DAILY
Qty: 10 TABLET | Refills: 0 | Status: ON HOLD | OUTPATIENT
Start: 2019-06-25 | End: 2019-07-11 | Stop reason: HOSPADM

## 2019-06-25 NOTE — TELEPHONE ENCOUNTER
----- Message from Cecy Loya sent at 6/25/2019  9:35 AM CDT -----  Contact: self/[phone]  Patient called in regards needing to talk with Dr Muniz about patient had a seizure on Friday and since then patient has been having headaches. And has been discharging green mucus out of the right eye, and coughing and sneezing a lot. Please call and advise. Thank you!!!

## 2019-06-25 NOTE — TELEPHONE ENCOUNTER
Spoke with Ms Ayala and her daughter, Reina ruiz her mom's recent hospitalization after episode hematuria and seizure.  Pt had declot procedure of AVgraft on 6/20-the following day had hematuria.  She missed 2 doses of keppra 6/20 for procedure and following morning had short seizure. She was hospitalized from 6/21 through 6/23.   She now c/o sinus pain/headache and cough/congestion with purulent drainage for eyes.  A/P #1-Sinusitis with bacterial conjunctivitis s/p recent admit a)Augmrntin 500mg QD x 1 week, b) Ciloxan .3% eye drops, c) Mucinex 1200mg Q12 hours as needed, d) Albuterol HFA prn, e)Albuterol refills for Nebulizer erx'd in/pt to borrow her grand-daughter's neb machine  #2-Hematuria/probably 2ndary to anticoagulant tx 6/20 a)Rechech Cath U/A and Cx as seems to be resolved now, #3-Recurrent seizure ?2ndary to missed doses of keppra vs other  A)Neurology appt/Seizure D/O ,b)Keppra 500mg TID daily.  Isaac, pt needs a RTC with me 8/28 (?at 1230PM) with prior lab.  She needs Neurology Appt/Seizure D/O, and Nurse appt for a Cath U/A and Cx

## 2019-06-26 ENCOUNTER — TELEPHONE (OUTPATIENT)
Dept: TRANSPLANT | Facility: CLINIC | Age: 59
End: 2019-06-26

## 2019-06-26 NOTE — TELEPHONE ENCOUNTER
----- Message from Nannette Carreno sent at 6/26/2019  8:11 AM CDT -----  Contact: Ricardo/Víctor [phone]  Prescription Clarification:     The pharmacy needs clarity on the following Rx:    ciprofloxacin HCl (CILOXAN) 0.3 % ophthalmic solution    Please clarify which SIG to be used.   Place 2 drops into both eyes every 2 (two) hours. OR 2 drops/affected eye 3-4x/day x 3-5 days     Pharmacy: [facility] - [address]    Thank You

## 2019-06-27 ENCOUNTER — TELEPHONE (OUTPATIENT)
Dept: INTERNAL MEDICINE | Facility: CLINIC | Age: 59
End: 2019-06-27

## 2019-06-27 NOTE — TELEPHONE ENCOUNTER
----- Message from Vickie Martinez sent at 6/27/2019  2:09 PM CDT -----  Contact: [phone]  Type: Returning a call    Who left a message?   Isaac    When did the practice call? 6-    Comments:  Please advise, thank you

## 2019-06-27 NOTE — TELEPHONE ENCOUNTER
Spoke with pt, she does not want to schedule any appointments until she is able to speak with her daughter, pt also needs a Tuesday or Thursday appt due to dialysis, won't make the appt on 8/28 due to it being a Wednesday.

## 2019-06-27 NOTE — TELEPHONE ENCOUNTER
Spoke with cecil, scheduled Neurology appt. Will call back to schedule all other appt, needs to check work schedule.

## 2019-07-03 ENCOUNTER — TELEPHONE (OUTPATIENT)
Dept: INTERNAL MEDICINE | Facility: CLINIC | Age: 59
End: 2019-07-03

## 2019-07-05 ENCOUNTER — TELEPHONE (OUTPATIENT)
Dept: INTERNAL MEDICINE | Facility: CLINIC | Age: 59
End: 2019-07-05

## 2019-07-05 ENCOUNTER — HOSPITAL ENCOUNTER (INPATIENT)
Facility: HOSPITAL | Age: 59
LOS: 6 days | Discharge: HOME | DRG: 628 | End: 2019-07-11
Attending: EMERGENCY MEDICINE | Admitting: EMERGENCY MEDICINE
Payer: MEDICARE

## 2019-07-05 DIAGNOSIS — G40.209 PARTIAL SYMPTOMATIC EPILEPSY WITH COMPLEX PARTIAL SEIZURES, NOT INTRACTABLE, WITHOUT STATUS EPILEPTICUS: ICD-10-CM

## 2019-07-05 DIAGNOSIS — I10 ESSENTIAL HYPERTENSION: ICD-10-CM

## 2019-07-05 DIAGNOSIS — N18.6 ESRD (END STAGE RENAL DISEASE): ICD-10-CM

## 2019-07-05 DIAGNOSIS — K59.01 SLOW TRANSIT CONSTIPATION: ICD-10-CM

## 2019-07-05 DIAGNOSIS — J96.01 ACUTE RESPIRATORY FAILURE WITH HYPOXIA: ICD-10-CM

## 2019-07-05 DIAGNOSIS — R07.9 CHEST PAIN: ICD-10-CM

## 2019-07-05 DIAGNOSIS — D86.9 SARCOIDOSIS: Chronic | ICD-10-CM

## 2019-07-05 DIAGNOSIS — I50.9 CHF (CONGESTIVE HEART FAILURE): ICD-10-CM

## 2019-07-05 DIAGNOSIS — N18.6 CONTROLLED TYPE 2 DIABETES MELLITUS WITH CHRONIC KIDNEY DISEASE ON CHRONIC DIALYSIS, WITHOUT LONG-TERM CURRENT USE OF INSULIN: ICD-10-CM

## 2019-07-05 DIAGNOSIS — Z99.2 ESRD (END STAGE RENAL DISEASE) ON DIALYSIS: ICD-10-CM

## 2019-07-05 DIAGNOSIS — K80.20 CALCULUS OF GALLBLADDER WITHOUT CHOLECYSTITIS WITHOUT OBSTRUCTION: ICD-10-CM

## 2019-07-05 DIAGNOSIS — E87.5 HYPERKALEMIA: Primary | ICD-10-CM

## 2019-07-05 DIAGNOSIS — I50.32 CHRONIC DIASTOLIC CHF (CONGESTIVE HEART FAILURE): ICD-10-CM

## 2019-07-05 DIAGNOSIS — N18.6 ANEMIA IN ESRD (END-STAGE RENAL DISEASE): ICD-10-CM

## 2019-07-05 DIAGNOSIS — D63.1 ANEMIA IN ESRD (END-STAGE RENAL DISEASE): ICD-10-CM

## 2019-07-05 DIAGNOSIS — Z99.2 CONTROLLED TYPE 2 DIABETES MELLITUS WITH CHRONIC KIDNEY DISEASE ON CHRONIC DIALYSIS, WITHOUT LONG-TERM CURRENT USE OF INSULIN: ICD-10-CM

## 2019-07-05 DIAGNOSIS — T82.898D PROBLEM WITH DIALYSIS ACCESS, SUBSEQUENT ENCOUNTER: ICD-10-CM

## 2019-07-05 DIAGNOSIS — R10.9 ABDOMINAL PAIN, UNSPECIFIED ABDOMINAL LOCATION: ICD-10-CM

## 2019-07-05 DIAGNOSIS — Z99.2 HEMODIALYSIS PATIENT: ICD-10-CM

## 2019-07-05 DIAGNOSIS — N18.6 ESRD (END STAGE RENAL DISEASE) ON DIALYSIS: ICD-10-CM

## 2019-07-05 DIAGNOSIS — E11.22 CONTROLLED TYPE 2 DIABETES MELLITUS WITH CHRONIC KIDNEY DISEASE ON CHRONIC DIALYSIS, WITHOUT LONG-TERM CURRENT USE OF INSULIN: ICD-10-CM

## 2019-07-05 DIAGNOSIS — M62.838 MUSCLE SPASTICITY: ICD-10-CM

## 2019-07-05 PROBLEM — K43.9 HERNIA OF ABDOMINAL WALL: Status: ACTIVE | Noted: 2019-07-05

## 2019-07-05 LAB
ALBUMIN SERPL BCP-MCNC: 3.7 G/DL (ref 3.5–5.2)
ALP SERPL-CCNC: 92 U/L (ref 55–135)
ALT SERPL W/O P-5'-P-CCNC: 9 U/L (ref 10–44)
ANION GAP SERPL CALC-SCNC: 16 MMOL/L (ref 8–16)
AST SERPL-CCNC: 12 U/L (ref 10–40)
BASOPHILS # BLD AUTO: 0.06 K/UL (ref 0–0.2)
BASOPHILS NFR BLD: 0.8 % (ref 0–1.9)
BILIRUB SERPL-MCNC: 0.5 MG/DL (ref 0.1–1)
BUN SERPL-MCNC: 57 MG/DL (ref 6–20)
CALCIUM SERPL-MCNC: 10 MG/DL (ref 8.7–10.5)
CHLORIDE SERPL-SCNC: 94 MMOL/L (ref 95–110)
CO2 SERPL-SCNC: 29 MMOL/L (ref 23–29)
CREAT SERPL-MCNC: 7.3 MG/DL (ref 0.5–1.4)
DIFFERENTIAL METHOD: ABNORMAL
EOSINOPHIL # BLD AUTO: 0.2 K/UL (ref 0–0.5)
EOSINOPHIL NFR BLD: 2.7 % (ref 0–8)
ERYTHROCYTE [DISTWIDTH] IN BLOOD BY AUTOMATED COUNT: 12.7 % (ref 11.5–14.5)
EST. GFR  (AFRICAN AMERICAN): 6.4 ML/MIN/1.73 M^2
EST. GFR  (NON AFRICAN AMERICAN): 5.5 ML/MIN/1.73 M^2
GLUCOSE SERPL-MCNC: 234 MG/DL (ref 70–110)
HCT VFR BLD AUTO: 35.6 % (ref 37–48.5)
HGB BLD-MCNC: 10.8 G/DL (ref 12–16)
IMM GRANULOCYTES # BLD AUTO: 0.04 K/UL (ref 0–0.04)
IMM GRANULOCYTES NFR BLD AUTO: 0.5 % (ref 0–0.5)
LACTATE SERPL-SCNC: 1.5 MMOL/L (ref 0.5–2.2)
LIPASE SERPL-CCNC: 64 U/L (ref 4–60)
LYMPHOCYTES # BLD AUTO: 1.4 K/UL (ref 1–4.8)
LYMPHOCYTES NFR BLD: 18.3 % (ref 18–48)
MAGNESIUM SERPL-MCNC: 2.5 MG/DL (ref 1.6–2.6)
MCH RBC QN AUTO: 32 PG (ref 27–31)
MCHC RBC AUTO-ENTMCNC: 30.3 G/DL (ref 32–36)
MCV RBC AUTO: 106 FL (ref 82–98)
MONOCYTES # BLD AUTO: 0.6 K/UL (ref 0.3–1)
MONOCYTES NFR BLD: 7.3 % (ref 4–15)
NEUTROPHILS # BLD AUTO: 5.5 K/UL (ref 1.8–7.7)
NEUTROPHILS NFR BLD: 70.4 % (ref 38–73)
NRBC BLD-RTO: 0 /100 WBC
PHOSPHATE SERPL-MCNC: 4.9 MG/DL (ref 2.7–4.5)
PLATELET # BLD AUTO: 238 K/UL (ref 150–350)
PMV BLD AUTO: 10.3 FL (ref 9.2–12.9)
POCT GLUCOSE: 166 MG/DL (ref 70–110)
POCT GLUCOSE: 177 MG/DL (ref 70–110)
POCT GLUCOSE: 261 MG/DL (ref 70–110)
POTASSIUM SERPL-SCNC: 5.6 MMOL/L (ref 3.5–5.1)
PROT SERPL-MCNC: 8.1 G/DL (ref 6–8.4)
RBC # BLD AUTO: 3.37 M/UL (ref 4–5.4)
SODIUM SERPL-SCNC: 139 MMOL/L (ref 136–145)
WBC # BLD AUTO: 7.83 K/UL (ref 3.9–12.7)

## 2019-07-05 PROCEDURE — 83605 ASSAY OF LACTIC ACID: CPT | Mod: HCNC,NTX

## 2019-07-05 PROCEDURE — 99223 1ST HOSP IP/OBS HIGH 75: CPT | Mod: HCNC,AI,NTX, | Performed by: PHYSICIAN ASSISTANT

## 2019-07-05 PROCEDURE — 84100 ASSAY OF PHOSPHORUS: CPT | Mod: HCNC,NTX

## 2019-07-05 PROCEDURE — S5571 INSULIN DISPOS PEN 3 ML: HCPCS | Mod: HCNC,NTX | Performed by: PHYSICIAN ASSISTANT

## 2019-07-05 PROCEDURE — 93010 EKG 12-LEAD: ICD-10-PCS | Mod: HCNC,NTX,, | Performed by: INTERNAL MEDICINE

## 2019-07-05 PROCEDURE — 93005 ELECTROCARDIOGRAM TRACING: CPT | Mod: HCNC,NTX

## 2019-07-05 PROCEDURE — 63600175 PHARM REV CODE 636 W HCPCS: Mod: HCNC,NTX | Performed by: PHYSICIAN ASSISTANT

## 2019-07-05 PROCEDURE — 25500020 PHARM REV CODE 255: Mod: HCNC,NTX | Performed by: EMERGENCY MEDICINE

## 2019-07-05 PROCEDURE — 83735 ASSAY OF MAGNESIUM: CPT | Mod: HCNC,NTX

## 2019-07-05 PROCEDURE — 93010 ELECTROCARDIOGRAM REPORT: CPT | Mod: HCNC,NTX,, | Performed by: INTERNAL MEDICINE

## 2019-07-05 PROCEDURE — 83690 ASSAY OF LIPASE: CPT | Mod: HCNC,NTX

## 2019-07-05 PROCEDURE — 25000003 PHARM REV CODE 250: Mod: HCNC,NTX | Performed by: PHYSICIAN ASSISTANT

## 2019-07-05 PROCEDURE — 99285 PR EMERGENCY DEPT VISIT,LEVEL V: ICD-10-PCS | Mod: HCNC,NTX,, | Performed by: EMERGENCY MEDICINE

## 2019-07-05 PROCEDURE — 11000001 HC ACUTE MED/SURG PRIVATE ROOM: Mod: HCNC,NTX

## 2019-07-05 PROCEDURE — 85025 COMPLETE CBC W/AUTO DIFF WBC: CPT | Mod: HCNC,NTX

## 2019-07-05 PROCEDURE — G0378 HOSPITAL OBSERVATION PER HR: HCPCS | Mod: HCNC,NTX

## 2019-07-05 PROCEDURE — 99223 PR INITIAL HOSPITAL CARE,LEVL III: ICD-10-PCS | Mod: HCNC,AI,NTX, | Performed by: PHYSICIAN ASSISTANT

## 2019-07-05 PROCEDURE — 80053 COMPREHEN METABOLIC PANEL: CPT | Mod: HCNC,NTX

## 2019-07-05 PROCEDURE — 99285 EMERGENCY DEPT VISIT HI MDM: CPT | Mod: HCNC,NTX,, | Performed by: EMERGENCY MEDICINE

## 2019-07-05 PROCEDURE — 99285 EMERGENCY DEPT VISIT HI MDM: CPT | Mod: HCNC,NTX,25

## 2019-07-05 RX ORDER — PREGABALIN 25 MG/1
25 CAPSULE ORAL 2 TIMES DAILY
Status: DISCONTINUED | OUTPATIENT
Start: 2019-07-05 | End: 2019-07-05

## 2019-07-05 RX ORDER — CIPROFLOXACIN HYDROCHLORIDE 3 MG/ML
2 SOLUTION/ DROPS OPHTHALMIC EVERY 6 HOURS
Status: DISCONTINUED | OUTPATIENT
Start: 2019-07-05 | End: 2019-07-05

## 2019-07-05 RX ORDER — MORPHINE SULFATE 15 MG/1
15 TABLET ORAL ONCE
Status: COMPLETED | OUTPATIENT
Start: 2019-07-05 | End: 2019-07-05

## 2019-07-05 RX ORDER — IBUPROFEN 200 MG
24 TABLET ORAL
Status: DISCONTINUED | OUTPATIENT
Start: 2019-07-05 | End: 2019-07-11 | Stop reason: HOSPADM

## 2019-07-05 RX ORDER — POLYETHYLENE GLYCOL 3350 17 G/17G
17 POWDER, FOR SOLUTION ORAL NIGHTLY
Status: DISCONTINUED | OUTPATIENT
Start: 2019-07-05 | End: 2019-07-07

## 2019-07-05 RX ORDER — ATORVASTATIN CALCIUM 20 MG/1
40 TABLET, FILM COATED ORAL DAILY
Status: DISCONTINUED | OUTPATIENT
Start: 2019-07-05 | End: 2019-07-05

## 2019-07-05 RX ORDER — CARVEDILOL 25 MG/1
25 TABLET ORAL 2 TIMES DAILY
Status: DISCONTINUED | OUTPATIENT
Start: 2019-07-06 | End: 2019-07-06

## 2019-07-05 RX ORDER — SODIUM CHLORIDE 9 MG/ML
INJECTION, SOLUTION INTRAVENOUS ONCE
Status: DISCONTINUED | OUTPATIENT
Start: 2019-07-05 | End: 2019-07-08

## 2019-07-05 RX ORDER — TRAMADOL HYDROCHLORIDE 50 MG/1
50 TABLET ORAL EVERY 8 HOURS PRN
Status: DISCONTINUED | OUTPATIENT
Start: 2019-07-05 | End: 2019-07-07

## 2019-07-05 RX ORDER — PREGABALIN 25 MG/1
25 CAPSULE ORAL
Status: DISCONTINUED | OUTPATIENT
Start: 2019-07-08 | End: 2019-07-11 | Stop reason: HOSPADM

## 2019-07-05 RX ORDER — RAMELTEON 8 MG/1
8 TABLET ORAL NIGHTLY PRN
Status: DISCONTINUED | OUTPATIENT
Start: 2019-07-05 | End: 2019-07-11 | Stop reason: HOSPADM

## 2019-07-05 RX ORDER — FUROSEMIDE 40 MG/1
40 TABLET ORAL DAILY
Status: DISCONTINUED | OUTPATIENT
Start: 2019-07-05 | End: 2019-07-11 | Stop reason: HOSPADM

## 2019-07-05 RX ORDER — IBUPROFEN 200 MG
16 TABLET ORAL
Status: DISCONTINUED | OUTPATIENT
Start: 2019-07-05 | End: 2019-07-11 | Stop reason: HOSPADM

## 2019-07-05 RX ORDER — FAMOTIDINE 20 MG/1
20 TABLET, FILM COATED ORAL DAILY
Status: DISCONTINUED | OUTPATIENT
Start: 2019-07-05 | End: 2019-07-11 | Stop reason: HOSPADM

## 2019-07-05 RX ORDER — INSULIN ASPART 100 [IU]/ML
0-5 INJECTION, SOLUTION INTRAVENOUS; SUBCUTANEOUS
Status: DISCONTINUED | OUTPATIENT
Start: 2019-07-05 | End: 2019-07-11 | Stop reason: HOSPADM

## 2019-07-05 RX ORDER — ONDANSETRON 2 MG/ML
4 INJECTION INTRAMUSCULAR; INTRAVENOUS EVERY 12 HOURS PRN
Status: DISCONTINUED | OUTPATIENT
Start: 2019-07-05 | End: 2019-07-11 | Stop reason: HOSPADM

## 2019-07-05 RX ORDER — GLUCAGON 1 MG
1 KIT INJECTION
Status: DISCONTINUED | OUTPATIENT
Start: 2019-07-05 | End: 2019-07-11 | Stop reason: HOSPADM

## 2019-07-05 RX ORDER — ALPRAZOLAM 0.5 MG/1
0.5 TABLET ORAL DAILY PRN
Status: DISCONTINUED | OUTPATIENT
Start: 2019-07-05 | End: 2019-07-11 | Stop reason: HOSPADM

## 2019-07-05 RX ORDER — POLYETHYLENE GLYCOL 3350 17 G/17G
17 POWDER, FOR SOLUTION ORAL EVERY 12 HOURS PRN
Status: DISCONTINUED | OUTPATIENT
Start: 2019-07-05 | End: 2019-07-11 | Stop reason: HOSPADM

## 2019-07-05 RX ORDER — SEVELAMER CARBONATE 800 MG/1
800 TABLET, FILM COATED ORAL
Status: DISCONTINUED | OUTPATIENT
Start: 2019-07-05 | End: 2019-07-11 | Stop reason: HOSPADM

## 2019-07-05 RX ORDER — BUPROPION HYDROCHLORIDE 150 MG/1
300 TABLET ORAL DAILY
Status: DISCONTINUED | OUTPATIENT
Start: 2019-07-06 | End: 2019-07-11 | Stop reason: HOSPADM

## 2019-07-05 RX ORDER — ATORVASTATIN CALCIUM 20 MG/1
40 TABLET, FILM COATED ORAL NIGHTLY
Status: DISCONTINUED | OUTPATIENT
Start: 2019-07-06 | End: 2019-07-11 | Stop reason: HOSPADM

## 2019-07-05 RX ORDER — SODIUM CHLORIDE 0.9 % (FLUSH) 0.9 %
10 SYRINGE (ML) INJECTION
Status: DISCONTINUED | OUTPATIENT
Start: 2019-07-05 | End: 2019-07-11 | Stop reason: HOSPADM

## 2019-07-05 RX ORDER — SODIUM BICARBONATE 650 MG/1
1300 TABLET ORAL 2 TIMES DAILY
Status: DISCONTINUED | OUTPATIENT
Start: 2019-07-05 | End: 2019-07-11

## 2019-07-05 RX ORDER — GUAIFENESIN 600 MG/1
1200 TABLET, EXTENDED RELEASE ORAL 2 TIMES DAILY PRN
Status: DISCONTINUED | OUTPATIENT
Start: 2019-07-05 | End: 2019-07-11 | Stop reason: HOSPADM

## 2019-07-05 RX ORDER — FOLIC ACID 1 MG/1
1 TABLET ORAL DAILY
Status: DISCONTINUED | OUTPATIENT
Start: 2019-07-06 | End: 2019-07-11 | Stop reason: HOSPADM

## 2019-07-05 RX ORDER — ASPIRIN 81 MG/1
81 TABLET ORAL EVERY MORNING
Status: DISCONTINUED | OUTPATIENT
Start: 2019-07-06 | End: 2019-07-11 | Stop reason: HOSPADM

## 2019-07-05 RX ORDER — MORPHINE SULFATE 2 MG/ML
6 INJECTION, SOLUTION INTRAMUSCULAR; INTRAVENOUS
Status: DISCONTINUED | OUTPATIENT
Start: 2019-07-05 | End: 2019-07-05

## 2019-07-05 RX ORDER — ACETAMINOPHEN 325 MG/1
650 TABLET ORAL EVERY 4 HOURS PRN
Status: DISCONTINUED | OUTPATIENT
Start: 2019-07-05 | End: 2019-07-11 | Stop reason: HOSPADM

## 2019-07-05 RX ORDER — ALBUTEROL SULFATE 90 UG/1
2 AEROSOL, METERED RESPIRATORY (INHALATION) EVERY 6 HOURS PRN
Status: DISCONTINUED | OUTPATIENT
Start: 2019-07-05 | End: 2019-07-11 | Stop reason: HOSPADM

## 2019-07-05 RX ORDER — LEVETIRACETAM 500 MG/1
500 TABLET ORAL EVERY 8 HOURS
Status: DISCONTINUED | OUTPATIENT
Start: 2019-07-05 | End: 2019-07-11 | Stop reason: HOSPADM

## 2019-07-05 RX ADMIN — SODIUM BICARBONATE 650 MG TABLET 1300 MG: at 09:07

## 2019-07-05 RX ADMIN — POLYETHYLENE GLYCOL 3350 17 G: 17 POWDER, FOR SOLUTION ORAL at 09:07

## 2019-07-05 RX ADMIN — TRAMADOL HYDROCHLORIDE 50 MG: 50 TABLET, FILM COATED ORAL at 06:07

## 2019-07-05 RX ADMIN — MORPHINE SULFATE 15 MG: 15 TABLET ORAL at 08:07

## 2019-07-05 RX ADMIN — LEVETIRACETAM 500 MG: 500 TABLET, FILM COATED ORAL at 05:07

## 2019-07-05 RX ADMIN — LEVETIRACETAM 500 MG: 500 TABLET, FILM COATED ORAL at 10:07

## 2019-07-05 RX ADMIN — INSULIN ASPART 1 UNITS: 100 INJECTION, SOLUTION INTRAVENOUS; SUBCUTANEOUS at 10:07

## 2019-07-05 RX ADMIN — INSULIN DETEMIR 8 UNITS: 100 INJECTION, SOLUTION SUBCUTANEOUS at 10:07

## 2019-07-05 RX ADMIN — FAMOTIDINE 20 MG: 20 TABLET, FILM COATED ORAL at 05:07

## 2019-07-05 RX ADMIN — SEVELAMER CARBONATE 800 MG: 800 TABLET, FILM COATED ORAL at 05:07

## 2019-07-05 RX ADMIN — IOHEXOL 100 ML: 350 INJECTION, SOLUTION INTRAVENOUS at 10:07

## 2019-07-05 NOTE — SUBJECTIVE & OBJECTIVE
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    Cortical cataract of both eyes 7/26/2016    Decubitus ulcer of buttock, stage 2     Degeneration of lumbar or lumbosacral intervertebral disc 3/5/2013    S/p MRI L-spine 5/2009     Depression     ESRD on hemodialysis     Started August 2018    General anesthetics causing adverse effect in therapeutic use     Hemorrhagic cerebrovascular accident (CVA)     8/2016 s/p Hemicraniotomy at [place] with Left hemiparesis    Hemorrhagic cerebrovascular accident (CVA) 1/3/2018    History of stroke 6/28/2017    s/p R-MCA stroke with R-putaminal hemorrhagic transformation in 8/2016 and 11/2016 (s/p hemicraniotomy at [facility]) with residual L hemiparesis, on AED s/p CVA      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    Hyperlipidemia     Hypertensive retinopathy of both eyes 7/26/2016    Impingement syndrome of right shoulder 7/2/2014    Left ventricular diastolic dysfunction with preserved systolic function 12/15/2015    Mixed anxiety and depressive disorder 3/5/2013    Obesity     THERESA (obstructive sleep apnea) 3/5/2013    No Home CPAP 2ndary to cost     Partial symptomatic epilepsy with complex partial seizures, not intractable, without status epilepticus     PEG (percutaneous endoscopic gastrostomy) status 6/28/2017    Renovascular hypertension 3/5/2013    Will resume home medications: amlodipine, labetalol, and hydralazine Will hold Lisinopril in setting of DARLING.    Rheumatoid arthritis(714.0)     Rotator cuff tear 7/2/2014    S/P breast biopsy, left 3/5/2013    Benign Breast Bx     S/P R shoulder surgery, SAD, DCE, biceps tenotomy 7/15/2014    S/P total  hysterectomy 7/10/2013    Sarcoidosis     Stroke 2016    left sided flaccidity, SAH    Type 2 diabetes mellitus with both eyes affected by moderate nonproliferative retinopathy without macular edema, without long-term current use of insulin 8/2/2017    Type 2 diabetes mellitus with diabetic polyneuropathy, without long-term current use of insulin 10/22/2015    Type 2 diabetes mellitus with stage 3 chronic kidney disease, without long-term current use of insulin 10/22/2015    Vertebral artery stenosis 3/5/2013    S/p Stenting per Dr Burnett        Past Surgical History:   Procedure Laterality Date    ARTHROSCOPY-SHOULDER WITH SUBACROMIAL DECOMPRESSION Right 7/9/2014    Performed by [facility]ja [facility] at [facility] OR    AV GRAFT CREATION Right 10/18/2018    Performed by Meir Valnecia MD at [facility] OR 2ND FLR    Biceps Tenotomy Right 7/9/2014    [facility] by [facility] at [facility] OR    BREAST SURGERY      breast reduction    COLONOSCOPY N/A 8/11/2016    Performed by Jerry Vilchis MD at [facility] ENDO (4TH FLR)    DEBRIDEMENT-SHOULDER-ARTHROSCOPIC Labral Cuff Right 7/9/2014    [facility] by [facility] at [facility] OR    DECLOT-GRAFT Right 6/20/2019    Performed by Cabrera Irwin MD at [facility] CATH LAB    ESOPHAGOGASTRODUODENOSCOPY (EGD) N/A 12/6/2017    Performed by Dallas Lock Jr., [facility] at [facility] ENDO    [id number]-[id number] END Right 7/9/2014    Performed by [facility]ja [facility] at [facility] OR    Fistulogram Right 2/11/2019    Performed by Meir Valencia MD at [facility] CATH LAB    HYSTERECTOMY      ROTATOR CUFF REPAIR Right July 9, 2014    right side    Skull surgery      Aneurysm    stent placed      in vertebral artery    TOTAL REDUCTION MAMMOPLASTY      TUBAL LIGATION         Review of patient's allergies indicates:   Allergen Reactions    Lisinopril Other (See Comments)     Angioedema      Vicodin [hydrocodone-acetaminophen] Rash     No problem with acetaminophen   "      Current Facility-Administered Medications on File Prior to Encounter   Medication    onabotulinumtoxina injection 300 Units     Current Outpatient Medications on File Prior to Encounter   Medication Sig    acetaminophen (TYLENOL) 325 MG tablet Take 2 tablets (650 mg total) by mouth every 6 (six) hours as needed for Pain.    albuterol (PROVENTIL) 2.5 mg /3 mL (0.083 %) nebulizer solution Take 3 mLs (2.5 mg total) by nebulization every 6 (six) hours as needed for Wheezing. Rescue    albuterol (VENTOLIN HFA) 90 mcg/actuation inhaler Inhale 2 puffs into the lungs every 6 (six) hours as needed for Wheezing. Rescue    ALPRAZolam (XANAX) 0.5 MG tablet 1 tab Every 8 hours as needed for anxiety    amoxicillin-clavulanate 500-125mg (AUGMENTIN) 500-125 mg Tab Take 1 tablet (500 mg total) by mouth once daily.    aspirin (ECOTRIN) 81 MG EC tablet Take 81 mg by mouth every morning.     atorvastatin (LIPITOR) 40 MG tablet TAKE 1 TABLET ONE TIME DAILY FOR CHOLESTEROL    BD INSULIN PEN NEEDLE UF SHORT 31 gauge x 5/16" Ndle USE TO INJECT NOVOLOG FLEXPEN BEFORE MEALS    blood sugar diagnostic (ACCU-CHEK SMARTVIEW TEST STRIP) Strp 1 strip by Misc.(Non-Drug; Combo Route) route 2 (two) times daily.    buPROPion (WELLBUTRIN XL) 300 MG 24 hr tablet Take 1 tablet (300 mg total) by mouth once daily.    carvedilol (COREG) 25 MG tablet Take 1 tablet (25 mg total) by mouth 2 (two) times daily.    ciprofloxacin HCl (CILOXAN) 0.3 % ophthalmic solution Place 2 drops into both eyes every 2 (two) hours. 2 drops/affected eye 3-4x/day x 3-5 days    dantrolene (DANTRIUM) 50 MG Cap Take 1 capsule (50 mg total) by mouth 3 (three) times daily.    DULCOLAX, BISACODYL, ORAL Take by mouth as needed (constipation).    ergocalciferol (ERGOCALCIFEROL) 50,000 unit Cap 1 capsule (50,000 Units total) by Per G Tube route every 7 days. (Patient taking differently: Take 50,000 Units by mouth every 7 days. Takes on Thurs)    famotidine (PEPCID) " 20 MG tablet Take 1 tablet (20 mg total) by mouth once daily.    ferrous sulfate 220 mg (44 mg iron)/5 mL solution 10ml daily for Iron/Give via PEG (Patient taking differently: Take 220 mg by mouth every morning. )    flash glucose scanning reader (FREESTYLE JOSÉ LUIS 14 DAY READER) Misc Use as directed. Scan 4 times a day.    flash glucose sensor (FREESTYLE JOSÉ LUIS 14 DAY SENSOR) Kit Change every 14 days.    folic acid (FOLVITE) 1 MG tablet Take 1 tablet (1 mg total) by mouth once daily.    furosemide (LASIX) 40 MG tablet Take 1 tablet (40 mg total) by mouth once daily. (Patient taking differently: Take 40 mg by mouth every morning. )    guaiFENesin (MUCINEX) 1,200 mg Ta12 1 tab every 12 hours as needed for congestion    insulin glargine (LANTUS U-100 INSULIN) 100 unit/mL injection 10 units daily in the PM    levETIRAcetam (KEPPRA) 500 MG Tab Take 1 tablet (500 mg total) by mouth every 8 (eight) hours.    lidocaine-prilocaine (EMLA) cream Apply topically as needed.    polyethylene glycol 3350 (MIRALAX ORAL) Take by mouth every evening.     pregabalin (LYRICA) 25 MG capsule 1 cap in AM and 2 caps at Bedtime for muscle spasm    sevelamer carbonate (RENVELA) 800 mg Tab Take 800 mg by mouth 3 (three) times daily with meals.    sodium bicarbonate 650 MG tablet Take 2 tablets (1,300 mg total) by mouth 2 (two) times daily.    traMADol (ULTRAM) 50 mg tablet 1 tab Q6-8 hours as needed for joint pain     Family History     Problem Relation (Age of Onset)    Bell's palsy Sister    Blindness Maternal Grandmother    Breast cancer Mother    Cancer Mother (55)    Diabetes Mother, Son,     Heart attack Mother    Heart disease Mother    Hypertension Mother, Sister    Lupus Sister    No Known Problems Daughter    Sleep apnea Sister    Stroke Sister, Maternal Aunt        Tobacco Use    Smoking status: Never Smoker    Smokeless tobacco: Never Used   Substance and Sexual Activity    Alcohol use: Not Currently     Comment:  occasional wine cooler     Drug use: No    Sexual activity: Yes     Partners: Male     Review of Systems   Constitutional: Negative for chills, diaphoresis, fatigue, fever and unexpected weight change.   HENT: Negative for drooling, sore throat, trouble swallowing and voice change.    Eyes: Negative for photophobia, pain, redness and visual disturbance.   Respiratory: Positive for cough, shortness of breath and wheezing. Negative for chest tightness.    Cardiovascular: Negative for chest pain, palpitations and leg swelling.   Gastrointestinal: Positive for abdominal pain. Negative for constipation, diarrhea, nausea and vomiting.   Endocrine: Negative for cold intolerance, heat intolerance, polydipsia, polyphagia and polyuria.   Genitourinary: Positive for decreased urine volume (still makes urine). Negative for difficulty urinating, dysuria, flank pain and frequency.   Musculoskeletal: Positive for back pain (chronic). Negative for arthralgias, myalgias and neck pain.   Skin: Negative for color change, pallor, rash and wound.   Neurological: Negative for dizziness, tremors, syncope, facial asymmetry, speech difficulty, weakness, light-headedness and headaches.   Hematological: Negative for adenopathy. Does not bruise/bleed easily.   Psychiatric/Behavioral: Negative for confusion, dysphoric mood and sleep disturbance. The patient is not nervous/anxious.      Objective:     Vital Signs (Most Recent):  Temp: 98.4 °F (36.9 °C) (07/05/19 0811)  Pulse: (!) 58 (07/05/19 1417)  Resp: 15 (07/05/19 1346)  BP: 115/60 (07/05/19 1417)  SpO2: 99 % (07/05/19 1417) Vital Signs (24h Range):  Temp:  [98.4 °F (36.9 °C)] 98.4 °F (36.9 °C)  Pulse:  [58-74] 58  Resp:  [11-20] 15  SpO2:  [86 %-100 %] 99 %  BP: (103-131)/(56-66) 115/60     Weight: 101 kg (222 lb 10.6 oz)  Body mass index is 39.44 kg/m².    Physical Exam   Constitutional: She is oriented to person, place, and time. She appears well-developed and well-nourished.   HENT:    Head: Normocephalic and atraumatic.   Eyes: Pupils are equal, round, and reactive to light. EOM are normal.   Neck: Normal range of motion. Neck supple. No tracheal deviation present. No thyromegaly present.   Cardiovascular: Normal rate, regular rhythm and intact distal pulses.   No murmur heard.  Pulmonary/Chest: Effort normal. She has no wheezes.   Crackles at bases b/l R>L   Abdominal: Soft. Bowel sounds are normal. There is tenderness in the right lower quadrant. There is no rebound, no guarding and no CVA tenderness.   Musculoskeletal: Normal range of motion. She exhibits no edema or tenderness.   Lymphadenopathy:     She has no cervical adenopathy.   Neurological: She is alert and oriented to person, place, and time. She has normal reflexes. No cranial nerve deficit.   Skin: Skin is warm and dry.   Psychiatric: She has a normal mood and affect. Her behavior is normal.   Nursing note and vitals reviewed.        CRANIAL NERVES     CN III, IV, VI   Pupils are equal, round, and reactive to light.  Extraocular motions are normal.        Significant Labs: All pertinent labs within the past 24 hours have been reviewed.    Significant Imaging: I have reviewed all pertinent imaging results/findings within the past 24 hours.

## 2019-07-05 NOTE — SUBJECTIVE & OBJECTIVE
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    Cortical cataract of both eyes 7/26/2016    Decubitus ulcer of buttock, stage 2     Degeneration of lumbar or lumbosacral intervertebral disc 3/5/2013    S/p MRI L-spine 5/2009     Depression     ESRD on hemodialysis     Started August 2018    General anesthetics causing adverse effect in therapeutic use     Hemorrhagic cerebrovascular accident (CVA)     8/2016 s/p Hemicraniotomy at [facility] with Left hemiparesis    Hemorrhagic cerebrovascular accident (CVA) 1/3/2018    History of stroke 6/28/2017    s/p R-MCA stroke with R-putaminal hemorrhagic transformation in 8/2016 and 11/2016 (s/p hemicraniotomy at [facility]) with residual L hemiparesis, on AED s/p CVA      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    Hyperlipidemia     Hypertensive retinopathy of both eyes 7/26/2016    Impingement syndrome of right shoulder 7/2/2014    Left ventricular diastolic dysfunction with preserved systolic function 12/15/2015    Mixed anxiety and depressive disorder 3/5/2013    Obesity     THERESA (obstructive sleep apnea) 3/5/2013    No Home CPAP 2ndary to cost     Partial symptomatic epilepsy with complex partial seizures, not intractable, without status epilepticus     PEG (percutaneous endoscopic gastrostomy) status 6/28/2017    Renovascular hypertension 3/5/2013    Will resume home medications: amlodipine, labetalol, and hydralazine Will hold Lisinopril in setting of DARLING.    Rheumatoid arthritis(714.0)     Rotator cuff tear 7/2/2014    S/P breast biopsy, left 3/5/2013    Benign Breast Bx     S/P R shoulder surgery, SAD, DCE, biceps tenotomy 7/15/2014    S/P total  hysterectomy 7/10/2013    Sarcoidosis     Stroke 2016    left sided flaccidity, SAH    Type 2 diabetes mellitus with both eyes affected by moderate nonproliferative retinopathy without macular edema, without long-term current use of insulin 8/2/2017    Type 2 diabetes mellitus with diabetic polyneuropathy, without long-term current use of insulin 10/22/2015    Type 2 diabetes mellitus with stage 3 chronic kidney disease, without long-term current use of insulin 10/22/2015    Vertebral artery stenosis 3/5/2013    S/p Stenting per Dr Burnett        Past Surgical History:   Procedure Laterality Date    ARTHROSCOPY-SHOULDER WITH SUBACROMIAL DECOMPRESSION Right 7/9/2014    [facility]    AV GRAFT CREATION Right 10/18/2018    Performed by Meir Valencia MD at [facility] OR 2ND FLR    Biceps Tenotomy Right 7/9/2014    [facility]    BREAST SURGERY      breast reduction    COLONOSCOPY N/A 8/11/2016    Performed by Jerry Vilchis MD at [facility] ENDO (4TH FLR)    DEBRIDEMENT-SHOULDER-ARTHROSCOPIC Labral Cuff Right 7/9/2014    [facility]    DECLOT-GRAFT Right 6/20/2019    Performed by Cabrera Irwin MD at [facility] CATH LAB    ESOPHAGOGASTRODUODENOSCOPY (EGD) N/A 12/6/2017    Performed by Dallas Lock Jr., [facility] at [facility] ENDO    [id number] END Right 7/9/2014    [facility]    Fistulogram Right 2/11/2019    Performed by Meir Valencia MD at [facility] CATH LAB    HYSTERECTOMY      ROTATOR CUFF REPAIR Right July 9, 2014    right side    Skull surgery      Aneurysm    stent placed      in vertebral artery    TOTAL REDUCTION MAMMOPLASTY      TUBAL LIGATION         Review of patient's allergies indicates:   Allergen Reactions    Lisinopril Other (See Comments)     Angioedema      Vicodin [hydrocodone-acetaminophen] Rash     No problem with acetaminophen   "    Current Facility-Administered Medications   Medication Frequency    0.9%  NaCl infusion Once    acetaminophen tablet 650 mg Q4H PRN    dextrose 10% (D10W) Bolus PRN    dextrose 10% (D10W) Bolus PRN    glucagon (human recombinant) injection 1 mg PRN    glucose chewable tablet 16 g PRN    glucose chewable tablet 24 g PRN    insulin aspart U-100 pen 0-5 Units QID (AC + HS) PRN    onabotulinumtoxina injection 300 Units 1 time in Clinic/HOD    ondansetron injection 4 mg Q12H PRN    polyethylene glycol packet 17 g Q12H PRN    ramelteon tablet 8 mg Nightly PRN    sodium chloride 0.9% flush 10 mL PRN     Current Outpatient Medications   Medication    acetaminophen (TYLENOL) 325 MG tablet    albuterol (PROVENTIL) 2.5 mg /3 mL (0.083 %) nebulizer solution    albuterol (VENTOLIN HFA) 90 mcg/actuation inhaler    ALPRAZolam (XANAX) 0.5 MG tablet    amoxicillin-clavulanate 500-125mg (AUGMENTIN) 500-125 mg Tab    aspirin (ECOTRIN) 81 MG EC tablet    atorvastatin (LIPITOR) 40 MG tablet    BD INSULIN PEN NEEDLE UF SHORT 31 gauge x 5/16" Ndle    blood sugar diagnostic (ACCU-CHEK SMARTVIEW TEST STRIP) Strp    buPROPion (WELLBUTRIN XL) 300 MG 24 hr tablet    carvedilol (COREG) 25 MG tablet    ciprofloxacin HCl (CILOXAN) 0.3 % ophthalmic solution    dantrolene (DANTRIUM) 50 MG Cap    DULCOLAX, BISACODYL, ORAL    ergocalciferol (ERGOCALCIFEROL) 50,000 unit Cap    famotidine (PEPCID) 20 MG tablet    ferrous sulfate 220 mg (44 mg iron)/5 mL solution    flash glucose scanning reader (FREESTYLE JOSÉ LUIS 14 DAY READER) Misc    flash glucose sensor (FREESTYLE JOSÉ LUIS 14 DAY SENSOR) Kit    folic acid (FOLVITE) 1 MG tablet    furosemide (LASIX) 40 MG tablet    guaiFENesin (MUCINEX) 1,200 mg Ta12    insulin glargine (LANTUS U-100 INSULIN) 100 unit/mL injection    levETIRAcetam (KEPPRA) 500 MG Tab    lidocaine-prilocaine (EMLA) cream    polyethylene glycol 3350 (MIRALAX ORAL)    pregabalin (LYRICA) 25 MG " capsule    sevelamer carbonate (RENVELA) 800 mg Tab    sodium bicarbonate 650 MG tablet    traMADol (ULTRAM) 50 mg tablet     Family History     Problem Relation (Age of Onset)    Bell's palsy Sister    Blindness Maternal Grandmother    Breast cancer Mother    Cancer Mother (55)    Diabetes Mother, Son,     Heart attack Mother    Heart disease Mother    Hypertension Mother, Sister    Lupus Sister    No Known Problems Daughter    Sleep apnea Sister    Stroke Sister, Maternal Aunt        Tobacco Use    Smoking status: Never Smoker    Smokeless tobacco: Never Used   Substance and Sexual Activity    Alcohol use: Not Currently     Comment: occasional wine cooler     Drug use: No    Sexual activity: Yes     Partners: Male     Review of Systems   Constitutional: Negative for chills and fever.   HENT: Negative for congestion and rhinorrhea.    Eyes: Negative for photophobia and visual disturbance.   Respiratory: Negative for cough and shortness of breath.    Cardiovascular: Negative for chest pain, palpitations and leg swelling.   Gastrointestinal: Positive for abdominal pain and constipation.   Endocrine: Negative for cold intolerance and heat intolerance.   Genitourinary: Negative for dysuria and flank pain.   Skin: Negative for rash and wound.   Neurological: Negative for seizures and headaches.   Hematological: Negative for adenopathy. Does not bruise/bleed easily.   Psychiatric/Behavioral: Negative for agitation and behavioral problems.     Objective:     Vital Signs (Most Recent):  Temp: 98.4 °F (36.9 °C) (07/05/19 0811)  Pulse: (!) 58 (07/05/19 1417)  Resp: 15 (07/05/19 1346)  BP: 115/60 (07/05/19 1417)  SpO2: 99 % (07/05/19 1417) Vital Signs (24h Range):  Temp:  [98.4 °F (36.9 °C)] 98.4 °F (36.9 °C)  Pulse:  [58-74] 58  Resp:  [11-20] 15  SpO2:  [86 %-100 %] 99 %  BP: (103-131)/(56-66) 115/60     Weight: 101 kg (222 lb 10.6 oz) (07/05/19 0811)  Body mass index is 39.44 kg/m².  Body surface area is 2.12  meters squared.    No intake/output data recorded.    Physical Exam   Constitutional: She is oriented to person, place, and time. She appears well-developed and well-nourished. No distress.   HENT:   Head: Normocephalic and atraumatic.   Mouth/Throat: Oropharynx is clear and moist. No oropharyngeal exudate.   Eyes: Pupils are equal, round, and reactive to light. EOM are normal.   Neck: Normal range of motion. Neck supple.   Cardiovascular: Normal rate, regular rhythm and normal heart sounds.   No murmur heard.  Pulmonary/Chest: Effort normal and breath sounds normal. No respiratory distress. She has no wheezes. She has no rales.   2L NC   Abdominal: Soft. Bowel sounds are normal. She exhibits no distension. There is tenderness (RLQ).   Musculoskeletal: She exhibits no edema, tenderness or deformity.   RUE AVG, +thrill   Neurological: She is alert and oriented to person, place, and time.   Skin: Skin is warm and dry.   Psychiatric: She has a normal mood and affect. Her behavior is normal.       Significant Labs:  CBC:   Recent Labs   Lab 07/05/19  0845   WBC 7.83   RBC 3.37*   HGB 10.8*   HCT 35.6*      *   MCH 32.0*   MCHC 30.3*     CMP:   Recent Labs   Lab 07/05/19  0845   *   CALCIUM 10.0   ALBUMIN 3.7   PROT 8.1      K 5.6*   CO2 29   CL 94*   BUN 57*   CREATININE 7.3*   ALKPHOS 92   ALT 9*   AST 12   BILITOT 0.5     All labs within the past 24 hours have been reviewed.    Significant Imaging:  Imaging Results          CT Abdomen Pelvis With Contrast (Final result)  Result time 07/05/19 12:22:35    Final result by Hadley Lua Jr., MD (07/05/19 12:22:35)                 Impression:      Several small layering gallstones without evidence of cholecystitis.    Calcification in the expected course of the mid right ureter that is likely vascular calcification in the right ovarian vein.  No hydronephrosis.  Correlation with clinical findings would be helpful.    Bowel demonstrates no  inflammatory or obstructive process.  Appendix appears unremarkable.    Small fat containing ventral wall hernia.    Irregular opacities at the lung bases likely atelectasis.  Developing infiltrate less likely but not excluded.    Electronically signed by resident: Hadley Brizuela  Date:    07/05/2019  Time:    10:44    Electronically signed by: Hadley Lua MD  Date:    07/05/2019  Time:    12:22             Narrative:    EXAMINATION:  CT ABDOMEN PELVIS WITH CONTRAST    CLINICAL HISTORY:  RLQ pain, appendicitis suspected    TECHNIQUE:  Low dose axial images, sagittal and coronal reformations were obtained from the lung bases to the pubic symphysis following the IV administration of 100 mL of Omnipaque 350 .  Oral contrast was not given.    COMPARISON:  CT renal stone 06/21/2019.    FINDINGS:  Heart: Normal in size. No pericardial effusion. Multivessel coronary atherosclerosis.  Calcification at the aortic valve annulus and mitral valve apparatus.    Lung Bases: Bibasilar band like opacities, likely subsegmental atelectasis.  No focal consolidation no pleural effusion.    Liver: Liver size is upper limits of normal.  No focal hepatic lesions.  Portal vein is patent.    Gallbladder: There are several small calcified layering gallstones.  Gallbladder wall is unremarkable and there is no pericholecystic fluid to indicate acute cholecystitis..    Bile Ducts: No evidence of dilated ducts.    Pancreas: No mass or peripancreatic fat stranding.    Spleen: Several punctate splenic calcifications, likely old granulomatous disease.    Adrenals: Unremarkable.    Kidneys/ Ureters: Bilateral kidneys are mildly atrophic with cortical thinning, consistent with medical renal disease.  There are no stones visualized in the collecting systems, but there is a punctate calcification seen along the course of the mid right ureter that is favored to represent a ovarian vein calcification (axial series 2, image 62).  No hydronephrosis.  No  definite ureteral stones or nephrolithiasis.    Bladder: No evidence of wall thickening.    Reproductive organs: Status post hysterectomy.    GI Tract/Mesentery: No evidence of bowel obstruction or inflammation. The appendix is unremarkable.    Peritoneal Space: No ascites. No free air.    Retroperitoneum:  No significant adenopathy.    Abdominal wall:  There is a fat containing ventral wall hernia with a 1.4 cm fascial defect.    Vasculature: Scattered calcification throughout the abdominal aorta and major branch vessels.  No high-grade stenosis or occlusion.    Bones: No acute fracture. Mild-to-moderate multilevel degenerative changes of the spine.

## 2019-07-05 NOTE — ED TRIAGE NOTES
Pt presents with c/o R sided abdominal pain and R flank pain that is worse with laying down. Symptoms have been on and off for awhile now but got worse in the middle of the night. Denies n/v. Reports she always has problems with constipation, last BM 2 days ago. Pt has hx of stroke with left sided deficit and is a dialysis pt MWF, does not make urine.

## 2019-07-05 NOTE — H&P
[facility] Hospital Medicine  History & Physical    Patient Name: Lucy Perez  MRN: [id number]  Admission Date: 7/5/2019  Attending Physician: Devante Vasquez MD   Primary Care Provider: Beverly Muniz MD    [facility] Medicine Team: [facility] MED Y Marylu Solorzano PA-C     Patient information was obtained from patient, past medical records and ER records.     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 Patient reports pain woke her up from sleep at 4 AM this morning. She describes it a constant, sharp pain that originally radiated to her back but has since localized to just RLQ. Pain has not worsened since onset but is worse with laying down and improved with sitting up. She has had similar episode of pain in the past (a few months ago) and was told it was gallstones. She was told to avoid fatty/spicy foods, which she reports compliance with. Patient also complaining of SOB, cough, and wheezing for past 2 days beginning after last dialysis session. She is on 2L O2 at home PRN for CHF symptoms. She endorses chronic constipation with last BM 2 days ago and described as hard (typical) without noted melena or hematochezia. Her last dialysis session was 7/3/19 without any complications, dry weight reported as 101.9 kg. She still produces small amount of urine. Of note, she had recent graft declotting procedure on 6/20/19 and was admitted to the hospital the following day for hematuria. She was discharged home with urology follow-up. She denies fever/chills, N/V/D, CP, edema, diaphoresis,  dizziness/lightheadedness, syncope, vision changes, dysphagia, slurred speech, focal weakness.       In the ED: O2 saturation dropped to 86%, placed on 2L NC with improvement. Remaining vitals WNL on arrival. Afebrile without leukocytosis. Lactate 1.5.  Lipase mildly elevated at 64. CT abdomen with contrast remarkable for: several small layering gallstones without evidence of cholecystitis.Calcification in the expected course of the mid right ureter that is likely vascular calcification in the right ovarian vein. Small fat containing ventral wall hernia.Irregular opacities at the lung bases likely atelectasis. EKG with sinus rhythm, 1st degree AV block. K 5.6. BUN 57. Cr 7.3. Glucose 234. Given morphine for pain control.     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    Cortical cataract of both eyes 7/26/2016    Decubitus ulcer of buttock, stage 2     Degeneration of lumbar or lumbosacral intervertebral disc 3/5/2013    S/p MRI L-spine 5/2009     Depression     ESRD on hemodialysis     Started August 2018    General anesthetics causing adverse effect in therapeutic use     Hemorrhagic cerebrovascular accident (CVA)     8/2016 s/p Hemicraniotomy at [facility] with Left hemiparesis    Hemorrhagic cerebrovascular accident (CVA) 1/3/2018    History of stroke 6/28/2017    s/p R-MCA stroke with R-putaminal hemorrhagic transformation in 8/2016 and 11/2016 (s/p hemicraniotomy at [facility]) with residual L hemiparesis, on AED s/p CVA      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    Hyperlipidemia     Hypertensive retinopathy of both eyes 7/26/2016    Impingement syndrome of right shoulder 7/2/2014    Left  ventricular diastolic dysfunction with preserved systolic function 12/15/2015    Mixed anxiety and depressive disorder 3/5/2013    Obesity     THERESA (obstructive sleep apnea) 3/5/2013    No Home CPAP 2ndary to cost     Partial symptomatic epilepsy with complex partial seizures, not intractable, without status epilepticus     PEG (percutaneous endoscopic gastrostomy) status 6/28/2017    Renovascular hypertension 3/5/2013    Will resume home medications: amlodipine, labetalol, and hydralazine Will hold Lisinopril in setting of DARLING.   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 without macular edema, without long-term current use of insulin 8/2/2017    Type 2 diabetes mellitus with diabetic polyneuropathy, without long-term current use of insulin 10/22/2015    Type 2 diabetes mellitus with stage 3 chronic kidney disease, without long-term current use of insulin 10/22/2015    Vertebral artery stenosis 3/5/2013    S/p Stenting per Dr Burnett        Past Surgical History:   Procedure Laterality Date    ARTHROSCOPY-SHOULDER WITH SUBACROMIAL DECOMPRESSION Right 7/9/2014    Performed by Kendall Pantoja [facility] at [facility] OR    AV GRAFT CREATION Right 10/18/2018    Performed by Meir [facility] at [facility] OR 2ND FLR    Biceps Tenotomy Right 7/9/2014    Performed by Kendall [facility]antoja [facility] at [facility] OR    BREAST SURGERY      breast reduction    COLONOSCOPY N/A 8/11/2016    Performed by Jerry Vilchis MD at [facility] ENDO (4TH FLR)    DEBRIDEMENT-SHOULDER-ARTHROSCOPIC Labral Cuff Right 7/9/2014    Performed by Kendall [facility]antoja [facility] at [facility] OR    DECLOT-GRAFT Right 6/20/2019    Performed by  "Cabrera Irwin MD at [facility] CATH LAB    ESOPHAGOGASTRODUODENOSCOPY (EGD) N/A 12/6/2017    Performed by Dallas Lock Jr., [facility] at [facility] ENDO    GMZMOOYU-[id number]-VNCTUX END Right 7/9/2014    Performed by Kendall [facility]ja [facility] at [facility]    Fistulogram Right 2/11/2019    Performed by Meir Valencia MD at [facility] CATH LAB    HYSTERECTOMY      ROTATOR CUFF REPAIR Right July 9, 2014    right side    Skull surgery      Aneurysm    stent placed      in vertebral artery    TOTAL REDUCTION MAMMOPLASTY      TUBAL LIGATION         Review of patient's allergies indicates:   Allergen Reactions    Lisinopril Other (See Comments)     Angioedema      Vicodin [hydrocodone-acetaminophen] Rash     No problem with acetaminophen        Current Facility-Administered Medications on File Prior to Encounter   Medication    onabotulinumtoxina injection 300 Units     Current Outpatient Medications on File Prior to Encounter   Medication Sig    acetaminophen (TYLENOL) 325 MG tablet Take 2 tablets (650 mg total) by mouth every 6 (six) hours as needed for Pain.    albuterol (PROVENTIL) 2.5 mg /3 mL (0.083 %) nebulizer solution Take 3 mLs (2.5 mg total) by nebulization every 6 (six) hours as needed for Wheezing. Rescue    albuterol (VENTOLIN HFA) 90 mcg/actuation inhaler Inhale 2 puffs into the lungs every 6 (six) hours as needed for Wheezing. Rescue    ALPRAZolam (XANAX) 0.5 MG tablet 1 tab Every 8 hours as needed for anxiety    amoxicillin-clavulanate 500-125mg (AUGMENTIN) 500-125 mg Tab Take 1 tablet (500 mg total) by mouth once daily.    aspirin (ECOTRIN) 81 MG EC tablet Take 81 mg by mouth every morning.     atorvastatin (LIPITOR) 40 MG tablet TAKE 1 TABLET ONE TIME DAILY FOR CHOLESTEROL    BD INSULIN PEN NEEDLE UF SHORT 31 gauge x 5/16" Ndle USE TO INJECT NOVOLOG FLEXPEN BEFORE MEALS    blood sugar diagnostic (ACCU-CHEK SMARTVIEW TEST STRIP) Strp 1 strip by Misc.(Non-Drug; Combo Route) route 2 (two) times daily. "    buPROPion (WELLBUTRIN XL) 300 MG 24 hr tablet Take 1 tablet (300 mg total) by mouth once daily.    carvedilol (COREG) 25 MG tablet Take 1 tablet (25 mg total) by mouth 2 (two) times daily.    ciprofloxacin HCl (CILOXAN) 0.3 % ophthalmic solution Place 2 drops into both eyes every 2 (two) hours. 2 drops/affected eye 3-4x/day x 3-5 days    dantrolene (DANTRIUM) 50 MG Cap Take 1 capsule (50 mg total) by mouth 3 (three) times daily.    DULCOLAX, BISACODYL, ORAL Take by mouth as needed (constipation).    ergocalciferol (ERGOCALCIFEROL) 50,000 unit Cap 1 capsule (50,000 Units total) by Per G Tube route every 7 days. (Patient taking differently: Take 50,000 Units by mouth every 7 days. Takes on Thurs)    famotidine (PEPCID) 20 MG tablet Take 1 tablet (20 mg total) by mouth once daily.    ferrous sulfate 220 mg (44 mg iron)/5 mL solution 10ml daily for Iron/Give via PEG (Patient taking differently: Take 220 mg by mouth every morning. )    flash glucose scanning reader (FREESTYLE JOSÉ LUIS 14 DAY READER) Misc Use as directed. Scan 4 times a day.    flash glucose sensor (FREESTYLE JOSÉ LUIS 14 DAY SENSOR) Kit Change every 14 days.    folic acid (FOLVITE) 1 MG tablet Take 1 tablet (1 mg total) by mouth once daily.    furosemide (LASIX) 40 MG tablet Take 1 tablet (40 mg total) by mouth once daily. (Patient taking differently: Take 40 mg by mouth every morning. )    guaiFENesin (MUCINEX) 1,200 mg Ta12 1 tab every 12 hours as needed for congestion    insulin glargine (LANTUS U-100 INSULIN) 100 unit/mL injection 10 units daily in the PM    levETIRAcetam (KEPPRA) 500 MG Tab Take 1 tablet (500 mg total) by mouth every 8 (eight) hours.    lidocaine-prilocaine (EMLA) cream Apply topically as needed.    polyethylene glycol 3350 (MIRALAX ORAL) Take by mouth every evening.     pregabalin (LYRICA) 25 MG capsule 1 cap in AM and 2 caps at Bedtime for muscle spasm    sevelamer carbonate (RENVELA) 800 mg Tab Take 800 mg by mouth  3 (three) times daily with meals.    sodium bicarbonate 650 MG tablet Take 2 tablets (1,300 mg total) by mouth 2 (two) times daily.    traMADol (ULTRAM) 50 mg tablet 1 tab Q6-8 hours as needed for joint pain     Family History     Problem Relation (Age of Onset)    Bell's palsy Sister    Blindness Maternal Grandmother    Breast cancer Mother    Cancer Mother (55)    Diabetes Mother, Son,     Heart attack Mother    Heart disease Mother    Hypertension Mother, Sister    Lupus Sister    No Known Problems Daughter    Sleep apnea Sister    Stroke Sister, Maternal Aunt        Tobacco Use    Smoking status: Never Smoker    Smokeless tobacco: Never Used   Substance and Sexual Activity    Alcohol use: Not Currently     Comment: occasional wine cooler     Drug use: No    Sexual activity: Yes     Partners: Male     Review of Systems   Constitutional: Negative for chills, diaphoresis, fatigue, fever and unexpected weight change.   HENT: Negative for drooling, sore throat, trouble swallowing and voice change.    Eyes: Negative for photophobia, pain, redness and visual disturbance.   Respiratory: Positive for cough, shortness of breath and wheezing. Negative for chest tightness.    Cardiovascular: Negative for chest pain, palpitations and leg swelling.   Gastrointestinal: Positive for abdominal pain. Negative for constipation, diarrhea, nausea and vomiting.   Endocrine: Negative for cold intolerance, heat intolerance, polydipsia, polyphagia and polyuria.   Genitourinary: Positive for decreased urine volume (still makes urine). Negative for difficulty urinating, dysuria, flank pain and frequency.   Musculoskeletal: Positive for back pain (chronic). Negative for arthralgias, myalgias and neck pain.   Skin: Negative for color change, pallor, rash and wound.   Neurological: Negative for dizziness, tremors, syncope, facial asymmetry, speech difficulty, weakness, light-headedness and headaches.   Hematological: Negative for  adenopathy. Does not bruise/bleed easily.   Psychiatric/Behavioral: Negative for confusion, dysphoric mood and sleep disturbance. The patient is not nervous/anxious.      Objective:     Vital Signs (Most Recent):  Temp: 98.4 °F (36.9 °C) (07/05/19 0811)  Pulse: (!) 58 (07/05/19 1417)  Resp: 15 (07/05/19 1346)  BP: 115/60 (07/05/19 1417)  SpO2: 99 % (07/05/19 1417) Vital Signs (24h Range):  Temp:  [98.4 °F (36.9 °C)] 98.4 °F (36.9 °C)  Pulse:  [58-74] 58  Resp:  [11-20] 15  SpO2:  [86 %-100 %] 99 %  BP: (103-131)/(56-66) 115/60     Weight: 101 kg (222 lb 10.6 oz)  Body mass index is 39.44 kg/m².    Physical Exam   Constitutional: She is oriented to person, place, and time. She appears well-developed and well-nourished.   HENT:   Head: Normocephalic and atraumatic.   Eyes: Pupils are equal, round, and reactive to light. EOM are normal.   Neck: Normal range of motion. Neck supple. No tracheal deviation present. No thyromegaly present.   Cardiovascular: Normal rate, regular rhythm and intact distal pulses.   No murmur heard.  Pulmonary/Chest: Effort normal. She has no wheezes.   Crackles at bases b/l R>L   Abdominal: Soft. Bowel sounds are normal. There is tenderness in the right lower quadrant. There is no rebound, no guarding and no CVA tenderness.   Musculoskeletal: Normal range of motion. She exhibits no edema or tenderness.   Lymphadenopathy:     She has no cervical adenopathy.   Neurological: She is alert and oriented to person, place, and time. She has normal reflexes. No cranial nerve deficit.   Skin: Skin is warm and dry.   Psychiatric: She has a normal mood and affect. Her behavior is normal.   Nursing note and vitals reviewed.        CRANIAL NERVES     CN III, IV, VI   Pupils are equal, round, and reactive to light.  Extraocular motions are normal.        Significant Labs: All pertinent labs within the past 24 hours have been reviewed.    Significant Imaging: I have reviewed all pertinent imaging  results/findings within the past 24 hours.    Assessment/Plan:     * Acute respiratory failure with hypoxia  O2 saturation dropped to 86% on RA, started on 2L NC with improvement  Pt has home O2 (2L PRN)  CXR with mild cardiomegaly.  No significant airspace consolidation or pleural effusion identified. Small subsegmental atelectatic changes noted at the right lung base.  Suspect fluid overloaded from missed dialysis session today. Nephrology consulted for HD.        Hyperkalemia  5.6 on admission, due for HD today  Consult Nephrology for HD      ESRD (end stage renal disease) on dialysis  MWF HD  Last session 7/3/19  Nephrology consulted  Renal Diet  RFP, Mg, CBC daily  Dry weight 101.9 kg per patient/      Cholelithiasis  Lipase 64, AST/ALT, T bili WNL  CT with contrast: Several small layering gallstones without evidence of cholecystitis.  Repeat lipase in the AM  Surgical referral outpatient      Vitamin D deficiency  Continue ergo every 7 days      Chronic diastolic CHF (congestive heart failure)  hypervolemic  Echo on 02/2018: small pericardial effusion, EF 60-65%, grade 2 diastolic dysfunction, pulmonary HTN, mild to moderated MR  Continue lasix 40 mg, hold coreg in setting of borderline hypotension/bradycardia  Will repeat echo, order CXR  Strict I/Os  Daily weights    Hernia of abdominal wall  CT revealed with small fat containing ventral wall hernia  Exam not concerning for strangulation    Controlled type 2 diabetes mellitus with chronic kidney disease on chronic dialysis, without long-term current use of insulin  Home regimen: lantus 10 units qhs  Recent HgbA1c 7.5%  Hospital regimen: Lantus 8 units with low dose correction scale  Hyper/hypoglycemia protocols in place  Renal Diabetic Diet        Anemia in ESRD (end-stage renal disease)  Hgb 10-11 the past 6 months, at baseline on admission      Essential hypertension  Controlled on admission  Patient reports BP improved with initiation of HD   Home  regimen: coreg 25 mg bid   Will hold coreg at this time in setting or borderline hypotension and bradycardia, will resume at reduced rate when BP/HR tolerable.    Hemorrhagic cerebrovascular accident (CVA)  8[place] [facility] [place], wheelchair bound    Hyperlipidemia  Continue atorvastatin 40 mg    Partial symptomatic epilepsy with complex partial seizures, not intractable, without status epilepticus  Seizure precautions  Continue Keppra      Sarcoidosis  Currently not on immunosuppressants         VTE Risk Mitigation (From admission, onward)        Ordered     Place sequential compression device  Until discontinued      07/05/19 1350     IP VTE HIGH RISK PATIENT  Once      07/05/19 1350             Marylu Solorzano PA-C  Department of Hospital Medicine   [facility]

## 2019-07-05 NOTE — ASSESSMENT & PLAN NOTE
8/2016 s/p [place]iot[place] [facility] [place] hemiparesis, wheelchair bound

## 2019-07-05 NOTE — HPI
58 yro F with PMH of ESRD on iHD MWF (R AVG), HTN, DM2 (A1C 7.5 ), L hemiplegia after hemorraghic CVA 2016, sarcoidosis, HFpEF, asthma, bipolar disease, THERESA, on 2 L PRN home O2 who presents to ED 7/5/19 with the complaint of RLQ abdominal pain present for 1 day that feels similar to prior episode of biliary colic exerpeinced ~1 mo ago. In June, patient had US and CT renal stones that did not reveal etiology of her pain. In the ED today, patient underwent CT a/p which showed some small stones but was otherwise unremarkable. Patient's last HD session was Wednesday and she is due for her next session today. She reports some SOB, relieved by 2L NC, which she uses PRN at home. She denies any edema in her extremities, abdomen, or other locations. ED labs reveal a K of 5.6 but denies CP, palpitations. BUN 57 (runs 30-70s), no other significant electrolyte derangements. She was recently admitted to [facility] 6/21 -6/23 for hyperkalemia and uremia.  She also recently had AVG declot 6/20 and reports not issues with AVG since. Nephrology has been consulted for management of HD as an in patient.     iHD MWF  HD center: [facility] MD: Zhanna CARL  Duration of HD: 3.5 hours  UF: 2L  UO: oliguric  EDW: pt does not know

## 2019-07-05 NOTE — NURSING
Received pt to floor from ED via stretcher accompanied by escort and spouse.  aaox 4. Complains of a 7/10 pain rating to the abdomen.  Respirations even and unlabored. No distress noted. Pt stable. Pt oriented to room and call bell placed within reach. Will continue to monitor.

## 2019-07-05 NOTE — ASSESSMENT & PLAN NOTE
Controlled on admission  Patient reports BP improved with initiation of HD   Home regimen: coreg 25 mg bid   Will hold coreg at this time in setting or borderline hypotension and bradycardia, will resume at reduced rate when BP/HR tolerable.

## 2019-07-05 NOTE — ASSESSMENT & PLAN NOTE
hypervolemic  Echo on 02/2018: small pericardial effusion, EF 60-65%, grade 2 diastolic dysfunction, pulmonary HTN, mild to moderated MR  Continue lasix 40 mg, hold coreg in setting of borderline hypotension/bradycardia  Will repeat echo, order CXR  Strict I/Os  Daily weights

## 2019-07-05 NOTE — CONSULTS
Consult acknowledged. Please see full consult note to follow.    Mesha Schwazr MD  Nephrology PGY-4

## 2019-07-05 NOTE — PROGRESS NOTES
[facility]  Nephrology  Progress Note    Patient Name: Lucy Perez  MRN: [id number]  Admission Date: 7/5/2019  Hospital Length of Stay: 0 days  Attending Provider: Anayeli Stern MD   Primary Care Physician: Beverly Muniz MD  Principal Problem:Acute respiratory failure with hypoxia    Subjective:     HPI: 58 yro F with PMH of ESRD on iHD MWF (R AVG), HTN, DM2 (A1C 7.5 ), L hemiplegia after hemorraghic CVA 2016, sarcoidosis, HFpEF, asthma, bipolar disease, THERESA, on 2 L PRN home O2 who presents to ED 7/5/19 with the complaint of RLQ abdominal pain present for 1 day that feels similar to prior episode of biliary colic exerpeinced ~1 mo ago. In June, patient had US and CT renal stones that did not reveal etiology of her pain. In the ED today, patient underwent CT a/p which showed some small stones but was otherwise unremarkable. Patient's last HD session was Wednesday and she is due for her next session today. She reports some SOB, relieved by 2L NC, which she uses PRN at home. She denies any edema in her extremities, abdomen, or other locations. ED labs reveal a K of 5.6 but denies CP, palpitations. BUN 57 (runs 30-70s), no other significant electrolyte derangements. She was recently admitted to [facility] 6/21 -6/23 for hyperkalemia and uremia.  She also recently had AVG declot 6/20 and reports not issues with AVG since. Nephrology has been consulted for management of HD as an in patient.     iHD MWF  HD center: [facility] MD: [facility]  Duration of HD: 3.5 hours  UF: 2L  UO: oliguric  EDW: pt does not know        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  10/22/2016    Cortical cataract of both eyes 7/26/2016    Decubitus ulcer of buttock, stage 2     Degeneration of lumbar or lumbosacral intervertebral disc 3/5/2013    S/p MRI L-spine 5/2009     Depression     ESRD on hemodialysis     Started August 2018    General anesthetics causing adverse effect in therapeutic use     Hemorrhagic cerebrovascular accident (CVA)     8/2016 s/p Hemicraniotomy at [facility] with Left hemiparesis    Hemorrhagic cerebrovascular accident (CVA) 1/3/2018    History of stroke 6/28/2017    s/p R-MCA stroke with R-putaminal hemorrhagic transformation in 8/2016 and 11/2016 (s/p hemicraniotomy at [facility]) with residual L hemiparesis, on AED s/p CVA      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    Hyperlipidemia     Hypertensive retinopathy of both eyes 7/26/2016    Impingement syndrome of right shoulder 7/2/2014    Left ventricular diastolic dysfunction with preserved systolic function 12/15/2015    Mixed anxiety and depressive disorder 3/5/2013    Obesity     THERESA (obstructive sleep apnea) 3/5/2013    No Home CPAP 2ndary to cost     Partial symptomatic epilepsy with complex partial seizures, not intractable, without status epilepticus     PEG (percutaneous endoscopic gastrostomy) status 6/28/2017    Renovascular hypertension 3/5/2013    Will resume home medications: amlodipine, labetalol, and hydralazine Will hold Lisinopril in setting of DARLING.   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 without macular edema, without long-term current use of insulin 8/2/2017    Type 2 diabetes mellitus with diabetic polyneuropathy, without long-term current use of insulin 10/22/2015     Type 2 diabetes mellitus with stage 3 chronic kidney disease, without long-term current use of insulin 10/22/2015    Vertebral artery stenosis 3/5/2013    S/p Stenting per Dr Burnett        Past Surgical History:   Procedure Laterality Date    ARTHROSCOPY-SHOULDER WITH SUBACROMIAL DECOMPRESSION Right 7/9/2014    Performed by Kendall Pantoja [facility] at [facility] OR    AV GRAFT CREATION Right 10/18/2018    Performed by Meir Valencia MD at [facility] OR 2ND FLR    Biceps Tenotomy Right 7/9/2014    Performed by Kendall [facility]antoja [facility] at [facility] OR    BREAST SURGERY      breast reduction    COLONOSCOPY N/A 8/11/2016    Performed by Jerry Vilchis MD at [facility] ENDO (4TH FLR)    DEBRIDEMENT-SHOULDER-ARTHROSCOPIC Labral Cuff Right 7/9/2014    Performed by Kendall Pantoja MD at [facility] OR    DECLOT-GRAFT Right 6/20/2019    Performed by Cabrera Irwin MD at [facility] CATH LAB    ESOPHAGOGASTRODUODENOSCOPY (EGD) N/A 12/6/2017    Performed by Dallas Lock Jr., [facility] at [facility] ENDO    ETEYHLFL-FVTTCDOZ-HZHBUG END Right 7/9/2014    Performed by Kendall Pantoja MD at [facility] OR    Fistulogram Right 2/11/2019    Performed by Meir Valencia MD at [facility] CATH LAB    HYSTERECTOMY      ROTATOR CUFF REPAIR Right July 9, 2014    right side    Skull surgery      Aneurysm    stent placed      in vertebral artery    TOTAL REDUCTION MAMMOPLASTY      TUBAL LIGATION         Review of patient's allergies indicates:   Allergen Reactions    Lisinopril Other (See Comments)     Angioedema      Vicodin [hydrocodone-acetaminophen] Rash     No problem with acetaminophen      Current Facility-Administered Medications   Medication Frequency    0.9%  NaCl infusion Once    acetaminophen tablet 650 mg Q4H PRN    dextrose 10% (D10W) Bolus PRN    dextrose 10% (D10W) Bolus PRN    glucagon (human recombinant) injection 1 mg PRN    glucose chewable tablet 16 g PRN    glucose chewable tablet 24 g PRN    insulin aspart U-100 pen 0-5 Units  "QID (AC + HS) PRN    onabotulinumtoxina injection 300 Units 1 time in Clinic/HOD    ondansetron injection 4 mg Q12H PRN    polyethylene glycol packet 17 g Q12H PRN    ramelteon tablet 8 mg Nightly PRN    sodium chloride 0.9% flush 10 mL PRN     Current Outpatient Medications   Medication    acetaminophen (TYLENOL) 325 MG tablet    albuterol (PROVENTIL) 2.5 mg /3 mL (0.083 %) nebulizer solution    albuterol (VENTOLIN HFA) 90 mcg/actuation inhaler    ALPRAZolam (XANAX) 0.5 MG tablet    amoxicillin-clavulanate 500-125mg (AUGMENTIN) 500-125 mg Tab    aspirin (ECOTRIN) 81 MG EC tablet    atorvastatin (LIPITOR) 40 MG tablet    BD INSULIN PEN NEEDLE UF SHORT 31 gauge x 5/16" Ndle    blood sugar diagnostic (ACCU-CHEK SMARTVIEW TEST STRIP) Strp    buPROPion (WELLBUTRIN XL) 300 MG 24 hr tablet    carvedilol (COREG) 25 MG tablet    ciprofloxacin HCl (CILOXAN) 0.3 % ophthalmic solution    dantrolene (DANTRIUM) 50 MG Cap    DULCOLAX, BISACODYL, ORAL    ergocalciferol (ERGOCALCIFEROL) 50,000 unit Cap    famotidine (PEPCID) 20 MG tablet    ferrous sulfate 220 mg (44 mg iron)/5 mL solution    flash glucose scanning reader (FREESTYLE JOSÉ LUIS 14 DAY READER) Misc    flash glucose sensor (FREESTYLE JOSÉ LUIS 14 DAY SENSOR) Kit    folic acid (FOLVITE) 1 MG tablet    furosemide (LASIX) 40 MG tablet    guaiFENesin (MUCINEX) 1,200 mg Ta12    insulin glargine (LANTUS U-100 INSULIN) 100 unit/mL injection    levETIRAcetam (KEPPRA) 500 MG Tab    lidocaine-prilocaine (EMLA) cream    polyethylene glycol 3350 (MIRALAX ORAL)    pregabalin (LYRICA) 25 MG capsule    sevelamer carbonate (RENVELA) 800 mg Tab    sodium bicarbonate 650 MG tablet    traMADol (ULTRAM) 50 mg tablet     Family History     Problem Relation (Age of Onset)    Bell's palsy Sister    Blindness Maternal Grandmother    Breast cancer Mother    Cancer Mother (55)    Diabetes Mother, Son,     Heart attack Mother    Heart disease Mother    Hypertension " Mother, Sister    Lupus Sister    No Known Problems Daughter    Sleep apnea Sister    Stroke Sister, Maternal Aunt        Tobacco Use    Smoking status: Never Smoker    Smokeless tobacco: Never Used   Substance and Sexual Activity    Alcohol use: Not Currently     Comment: occasional wine cooler     Drug use: No    Sexual activity: Yes     Partners: Male     Review of Systems   Constitutional: Negative for chills and fever.   HENT: Negative for congestion and rhinorrhea.    Eyes: Negative for photophobia and visual disturbance.   Respiratory: Negative for cough and shortness of breath.    Cardiovascular: Negative for chest pain, palpitations and leg swelling.   Gastrointestinal: Positive for abdominal pain and constipation.   Endocrine: Negative for cold intolerance and heat intolerance.   Genitourinary: Negative for dysuria and flank pain.   Skin: Negative for rash and wound.   Neurological: Negative for seizures and headaches.   Hematological: Negative for adenopathy. Does not bruise/bleed easily.   Psychiatric/Behavioral: Negative for agitation and behavioral problems.     Objective:     Vital Signs (Most Recent):  Temp: 98.4 °F (36.9 °C) (07/05/19 0811)  Pulse: (!) 58 (07/05/19 1417)  Resp: 15 (07/05/19 1346)  BP: 115/60 (07/05/19 1417)  SpO2: 99 % (07/05/19 1417) Vital Signs (24h Range):  Temp:  [98.4 °F (36.9 °C)] 98.4 °F (36.9 °C)  Pulse:  [58-74] 58  Resp:  [11-20] 15  SpO2:  [86 %-100 %] 99 %  BP: (103-131)/(56-66) 115/60     Weight: 101 kg (222 lb 10.6 oz) (07/05/19 0811)  Body mass index is 39.44 kg/m².  Body surface area is 2.12 meters squared.    No intake/output data recorded.    Physical Exam   Constitutional: She is oriented to person, place, and time. She appears well-developed and well-nourished. No distress.   HENT:   Head: Normocephalic and atraumatic.   Mouth/Throat: Oropharynx is clear and moist. No oropharyngeal exudate.   Eyes: Pupils are equal, round, and reactive to light. EOM are  normal.   Neck: Normal range of motion. Neck supple.   Cardiovascular: Normal rate, regular rhythm and normal heart sounds.   No murmur heard.  Pulmonary/Chest: Effort normal and breath sounds normal. No respiratory distress. She has no wheezes. She has no rales.   2L NC   Abdominal: Soft. Bowel sounds are normal. She exhibits no distension. There is tenderness (RLQ).   Musculoskeletal: She exhibits no edema, tenderness or deformity.   RUE AVG, +thrill   Neurological: She is alert and oriented to person, place, and time.   Skin: Skin is warm and dry.   Psychiatric: She has a normal mood and affect. Her behavior is normal.       Significant Labs:  CBC:   Recent Labs   Lab 07/05/19  0845   WBC 7.83   RBC 3.37*   HGB 10.8*   HCT 35.6*      *   MCH 32.0*   MCHC 30.3*     CMP:   Recent Labs   Lab 07/05/19  0845   *   CALCIUM 10.0   ALBUMIN 3.7   PROT 8.1      K 5.6*   CO2 29   CL 94*   BUN 57*   CREATININE 7.3*   ALKPHOS 92   ALT 9*   AST 12   BILITOT 0.5     All labs within the past 24 hours have been reviewed.    Significant Imaging:  Imaging Results          CT Abdomen Pelvis With Contrast (Final result)  Result time 07/05/19 12:22:35    Final result by Hadley Lua Jr., MD (07/05/19 12:22:35)                 Impression:      Several small layering gallstones without evidence of cholecystitis.    Calcification in the expected course of the mid right ureter that is likely vascular calcification in the right ovarian vein.  No hydronephrosis.  Correlation with clinical findings would be helpful.    Bowel demonstrates no inflammatory or obstructive process.  Appendix appears unremarkable.    Small fat containing ventral wall hernia.    Irregular opacities at the lung bases likely atelectasis.  Developing infiltrate less likely but not excluded.    Electronically signed by resident: Hadley April  Date:    07/05/2019  Time:    10:44    Electronically signed by: Hadley Lua  MD  Date:    07/05/2019  Time:    12:22             Narrative:    EXAMINATION:  CT ABDOMEN PELVIS WITH CONTRAST    CLINICAL HISTORY:  RLQ pain, appendicitis suspected    TECHNIQUE:  Low dose axial images, sagittal and coronal reformations were obtained from the lung bases to the pubic symphysis following the IV administration of 100 mL of Omnipaque 350 .  Oral contrast was not given.    COMPARISON:  CT renal stone 06/21/2019.    FINDINGS:  Heart: Normal in size. No pericardial effusion. Multivessel coronary atherosclerosis.  Calcification at the aortic valve annulus and mitral valve apparatus.    Lung Bases: Bibasilar band like opacities, likely subsegmental atelectasis.  No focal consolidation no pleural effusion.    Liver: Liver size is upper limits of normal.  No focal hepatic lesions.  Portal vein is patent.    Gallbladder: There are several small calcified layering gallstones.  Gallbladder wall is unremarkable and there is no pericholecystic fluid to indicate acute cholecystitis..    Bile Ducts: No evidence of dilated ducts.    Pancreas: No mass or peripancreatic fat stranding.    Spleen: Several punctate splenic calcifications, likely old granulomatous disease.    Adrenals: Unremarkable.    Kidneys/ Ureters: Bilateral kidneys are mildly atrophic with cortical thinning, consistent with medical renal disease.  There are no stones visualized in the collecting systems, but there is a punctate calcification seen along the course of the mid right ureter that is favored to represent a ovarian vein calcification (axial series 2, image 62).  No hydronephrosis.  No definite ureteral stones or nephrolithiasis.    Bladder: No evidence of wall thickening.    Reproductive organs: Status post hysterectomy.    GI Tract/Mesentery: No evidence of bowel obstruction or inflammation. The appendix is unremarkable.    Peritoneal Space: No ascites. No free air.    Retroperitoneum:  No significant adenopathy.    Abdominal wall:  There  is a fat containing ventral wall hernia with a 1.4 cm fascial defect.    Vasculature: Scattered calcification throughout the abdominal aorta and major branch vessels.  No high-grade stenosis or occlusion.    Bones: No acute fracture. Mild-to-moderate multilevel degenerative changes of the spine.                              Assessment/Plan:     ESRD (end stage renal disease) on dialysis  58 yro F with PMH of ESRD on iHD MWF ([facility]), HTN, DM2 (A1C 7.5 ), L hemiplegia after hemorraghic CVA 2016, HFpEF, THERESA, on 2 L PRN home O2 who presents to ED 7/5/19 with the complaint of RLQ abdominal pain present for 1 day that feels similar to prior episode of biliary colic exerpeinced ~1 mo ago. Nephrology has been consulted for management of HD as an in patient.     Out pt iHD careplan, per pt report:  iHD MWF  HD center: [facility] MD: [facility]  Duration of HD: 3.5 hours  UF: 2L  UO: oliguric  EDW: pt does not know    - hyperkalemic to 5.6 on admission, denies symptoms, EKG no hyperkalemia related changes  - will provide dialysis for metabolic clearance and volume management, tentatively planned for tonight 7/5 pending availability. Will dialyze tomorrow if no availability tonight  -   - Target ultrafiltration 1-2 L as tolerated, keep map >65, over 3 hours  - Dialysate adjusted to current labs   - Avoid nephrotoxic medications  - Medication doses adjusted to renal parameters  - Strict Input and Output and chart  - Daily standing weights      Abdominal Pain RLQ  -CT a/p on admission, as well as US and CT renal stone from June are all unrevealing as to etiology of pain  -pt reports pain feels similar to past episode of biliary colic   -per primary team    Anemia of Chronic Kidney Disease   - Hb admission 10.8  - Hb > 7 gm/dL, to target 10 mg/dL for CKD/ESRD patients  - denies CHICHI as out pt, reports sometimes received IV Fe with HD, denies Fe tablets out pt      Mineral Bone Disease in CKD   - Renal Function  Panel Daily for electrolytes monitoring  - Phos pending  - Continue home renvella, pending return of lab      HTN   - Normotensive, will continue to monitor. Goal for BP is <140 mmHg SBP and BDP <90 mmHg, maintain MAP > 65.      Nutrition   - Renal Diet, low potassium    Case discussed with staff further recs with attestation.              Thank you for your consult. I will follow-up with patient. Please contact us if you have any additional questions.    Lani Schwarz MD  Nephrology  [facility]

## 2019-07-05 NOTE — Clinical Note
A venogram was performed on the right AV fistula. Injected with multiple hand injections. Post intervention

## 2019-07-05 NOTE — TELEPHONE ENCOUNTER
----- Message from Salma Desouza sent at 7/5/2019 12:53 PM CDT -----  Contact: daughter/cecil/[phone]  Pt daughter called about the pt still being in pain and wanted to know what can the dr do. She was given morphine in the er and since they can't figure out what's wrong they are sending her home.       Please advise

## 2019-07-05 NOTE — ASSESSMENT & PLAN NOTE
Lipase 64, AST/ALT, T bili WNL  CT with contrast: Several small layering gallstones without evidence of cholecystitis.  Repeat lipase in the AM  Surgical referral outpatient

## 2019-07-05 NOTE — HPI
59 y/o F with PMH of ESRD on HD MWF, last session Wednesday (7/3/19), T2DM on insulin, sarcoidosis, CHF (last EF 60-65%, grade 2 diastolic dysfunction), RA, 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 Patient reports pain woke her up from sleep at 4 AM this morning. She describes it a constant, sharp pain that originally radiated to her back but has since localized to just RLQ. Pain has not worsened since onset but is worse with laying down and improved with sitting up. She has had similar episode of pain in the past (a few months ago) and was told it was gallstones. She was told to avoid fatty/spicy foods, which she reports compliance with. Patient also complaining of SOB, cough, and wheezing for past 2 days beginning after last dialysis session. She is on 2L O2 at home PRN for CHF symptoms. She endorses chronic constipation with last BM 2 days ago and described as hard (typical) without noted melena or hematochezia. Her last dialysis session was 7/3/19 without any complications, dry weight reported as 101.9 kg. She still produces small amount of urine. Of note, she had recent graft declotting procedure on 6/20/19 and was admitted to the hospital the following day for hematuria. She was discharged home with urology follow-up. She denies fever/chills, N/V/D, CP, edema, diaphoresis, dizziness/lightheadedness, syncope, vision changes, dysphagia, slurred speech, focal weakness.       In the ED: O2 saturation dropped to 86%, placed on 2L NC with improvement. Remaining vitals WNL on arrival. Afebrile without leukocytosis. Lactate 1.5.  Lipase mildly elevated at 64. CT abdomen with contrast remarkable for: several small layering gallstones without evidence of cholecystitis.Calcification in the expected course of the mid right ureter that is likely vascular calcification in the right ovarian vein. Small fat containing ventral wall hernia.Irregular opacities at the  lung bases likely atelectasis. EKG with sinus rhythm, 1st degree AV block. K 5.6. BUN 57. Cr 7.3. Glucose 234. Given morphine for pain control.

## 2019-07-05 NOTE — ASSESSMENT & PLAN NOTE
MWF HD  Last session 7/3/19  Nephrology consulted  Renal Diet  RFP, Mg, CBC daily  Dry weight 101.9 kg per patient/

## 2019-07-05 NOTE — PLAN OF CARE
Problem: Adult Inpatient Plan of Care  Goal: Plan of Care Review  Pt on fall precautions. Yellow fall risk band and socks placed on pt. Instructed pt to call for assistance as needed and pt voiced understanding. Spouse at bedside. Complains of intermittent abdominal pain. Dialysis MWF. Accu checks ac/hs with insulin sliding scale. SB/SR on tele.

## 2019-07-05 NOTE — ASSESSMENT & PLAN NOTE
58 yro F with PMH of ESRD on iHD MWF (RUCEE AVG), HTN, DM2 (A1C 7.5 ), L hemiplegia after hemorraghic CVA 2016, HFpEF, THERESA, on 2 L PRN home O2 who presents to ED 7/5/19 with the complaint of RLQ abdominal pain present for 1 day that feels similar to prior episode of biliary colic exerpeinced ~1 mo ago. Nephrology has been consulted for management of HD as an in patient.     Out pt iHD careplan, per pt report:  iHD MWF  HD center: [facility] MD: Zh[facility]  Duration of HD: 3.5 hours  UF: 2L  UO: oliguric  EDW: pt does not know    - hyperkalemic to 5.6 on admission, denies symptoms, EKG no hyperkalemia related changes  - will provide dialysis for metabolic clearance and volume management, tentatively planned for tonight 7/5 pending availability. Will dialyze tomorrow if no availability tonight  -   - Target ultrafiltration 1-2 L as tolerated, keep map >65, over 3 hours  - Dialysate adjusted to current labs   - Avoid nephrotoxic medications  - Medication doses adjusted to renal parameters  - Strict Input and Output and chart  - Daily standing weights      Abdominal Pain RLQ  -CT a/p on admission, as well as US and CT renal stone from June are all unrevealing as to etiology of pain  -pt reports pain feels similar to past episode of biliary colic   -per primary team    Anemia of Chronic Kidney Disease   - Hb admission 10.8  - Hb > 7 gm/dL, to target 10 mg/dL for CKD/ESRD patients  - denies CHICHI as out pt, reports sometimes received IV Fe with HD, denies Fe tablets out pt      Mineral Bone Disease in CKD   - Renal Function Panel Daily for electrolytes monitoring  - Phos pending  - Continue home renvella, pending return of lab      HTN   - Normotensive, will continue to monitor. Goal for BP is <140 mmHg SBP and BDP <90 mmHg, maintain MAP > 65.      Nutrition   - Renal Diet, low potassium    Case discussed with staff further recs with attestation.

## 2019-07-05 NOTE — ASSESSMENT & PLAN NOTE
O2 saturation dropped to 86% on RA, started on 2L NC with improvement  Pt has home O2 (2L PRN)  CXR with mild cardiomegaly.  No significant airspace consolidation or pleural effusion identified. Small subsegmental atelectatic changes noted at the right lung base.  Suspect fluid overloaded from missed dialysis session today. Nephrology consulted for HD.

## 2019-07-05 NOTE — ED PROVIDER NOTES
Encounter Date: 7/5/2019       History     Chief Complaint   Patient presents with    Abdominal Pain     last dialysis wed     58-year-old female with multiple comorbidities including ESRD on HD MWF, DM 2, sarcoidosis, CHF, RA, history of hemorrhagic CVA presents for right lower quadrant abdominal pain for 5 hr.  She reports gradual onset of pulsing intermittent pain which occasionally radiates into the right lower back.  Pain is improved with sitting up, worse with lying flat.  States the pain is consistent with prior episodes of biliary colic.  She denies eating any greasy fatty foods prior to pain onset.  She reports associated chronic constipation, last bowel movement was 2 days ago.  She denies bloody or dark stool, nausea/vomiting, fevers, chest pain or shortness of breath. The patient does not make urine.  Last dialysis 2 days ago.        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    Cortical cataract of both eyes 7/26/2016    Decubitus ulcer of buttock, stage 2     Degeneration of lumbar or lumbosacral intervertebral disc 3/5/2013    S/p MRI L-spine 5/2009     Depression     ESRD on hemodialysis     Started August 2018    General anesthetics causing adverse effect in therapeutic use     Hemorrhagic cerebrovascular accident (CVA)     8/2016 s/p Hemicraniotomy at [facility] with Left hemiparesis    Hemorrhagic cerebrovascular accident (CVA) 1/3/2018    History of stroke 6/28/2017     s/p R-MCA stroke with R-putaminal hemorrhagic transformation in 8/2016 and 11/2016 ([place] [facility] [place] residual L hemiparesis, on AED s/p CVA      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    Hyperlipidemia     Hypertensive retinopathy of both eyes 7/26/2016    Impingement syndrome of right shoulder 7/2/2014    Left ventricular diastolic dysfunction with preserved systolic function 12/15/2015    Mixed anxiety and depressive disorder 3/5/2013    Obesity     THERESA (obstructive sleep apnea) 3/5/2013    No Home CPAP 2ndary to cost     Partial symptomatic epilepsy with complex partial seizures, not intractable, without status epilepticus     PEG (percutaneous endoscopic gastrostomy) status 6/28/2017    Renovascular hypertension 3/5/2013    Will resume home medications: amlodipine, labetalol, and hydralazine Will hold Lisinopril in setting of DARLING.   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 without macular edema, without long-term current use of insulin 8/2/2017    Type 2 diabetes mellitus with diabetic polyneuropathy, without long-term current use of insulin 10/22/2015    Type 2 diabetes mellitus with stage 3 chronic kidney disease, without long-term current use of insulin 10/22/2015    Vertebral artery stenosis 3/5/2013    S/p Stenting per Dr Burnett      Past Surgical History:   Procedure Laterality Date    ARTHROSCOPY-SHOULDER WITH SUBACROMIAL DECOMPRESSION Right 7/9/2014    [facility]    AV GRAFT CREATION Right 10/18/2018    [facility] 2ND F[facility]    Biceps Tenotomy Right 7/9/2014    Performed by Kendall LAMBERT  "MD Kit at [facility] OR    BREAST SURGERY      breast reduction    COLONOSCOPY N/A 8/11/2016    Performed by Jerry Vilchis MD at [facility] ENDO (4TH FLR)    DEBRIDEMENT-SHOULDER-ARTHROSCOPIC Labral Cuff Right 7/9/2014    Performed by Kendall [facility]antoja [facility] at [facility] OR    DECLOT-GRAFT Right 6/20/2019    Performed by Cabrera Irwin MD at [facility] CATH LAB    ESOPHAGOGASTRODUODENOSCOPY (EGD) N/A 12/6/2017    Performed by Dallas Lock Jr., [facility] at [facility] ENDO    AMQSUMRB-IRCUTHTC-WRPDDA END Right 7/9/2014    Performed by Kendall [facility]antoja [facility] at [facility] OR    Fistulogram Right 2/11/2019    Performed by Meir Valencia [facility] at [facility] CATH LAB    HYSTERECTOMY      ROTATOR CUFF REPAIR Right July 9, 2014    right side    Skull surgery      Aneurysm    stent placed      in vertebral artery    TOTAL REDUCTION MAMMOPLASTY      TUBAL LIGATION       Family History   Problem Relation Age of Onset    Cancer Mother 55        Breast cancer    Diabetes Mother     Hypertension Mother     Heart disease Mother     Heart attack Mother     Breast cancer Mother     Stroke Sister     Hypertension Sister     Sleep apnea Sister     No Known Problems Daughter     Diabetes Son         controlled    Bell's palsy Sister     Lupus Sister     Blindness Maternal Grandmother     Diabetes Unknown         "My entire family family has diabetes"    Stroke Maternal Aunt     Amblyopia Neg Hx     Cataracts Neg Hx     Glaucoma Neg Hx     Macular degeneration Neg Hx     Retinal detachment Neg Hx     Strabismus Neg Hx      Social History     Tobacco Use    Smoking status: Never Smoker    Smokeless tobacco: Never Used   Substance Use Topics    Alcohol use: Not Currently     Comment: occasional wine cooler     Drug use: No     Review of Systems   Constitutional: Positive for appetite change and fatigue. Negative for chills, diaphoresis and fever.   Respiratory: Negative for cough and shortness of breath.    Cardiovascular: " Negative for chest pain and palpitations.   Gastrointestinal: Positive for abdominal pain and constipation. Negative for abdominal distention, anal bleeding, blood in stool, diarrhea, nausea, rectal pain and vomiting.   Genitourinary: Negative for flank pain and urgency.   Musculoskeletal: Positive for back pain. Negative for myalgias.   Skin: Negative for color change and pallor.   Allergic/Immunologic: Negative for food allergies and immunocompromised state.   Neurological: Positive for light-headedness. Negative for weakness, numbness and headaches.   Hematological: Negative for adenopathy. Does not bruise/bleed easily.       Physical Exam     Initial Vitals [07/05/19 0811]   BP Pulse Resp Temp SpO2   131/66 74 18 98.4 °F (36.9 °C) 95 %      MAP       --         Physical Exam    Vitals reviewed.  Constitutional: She appears well-developed and well-nourished. She is not diaphoretic. No distress.   Family member at bedside   HENT:   Head: Normocephalic and atraumatic.   Eyes: EOM are normal. Pupils are equal, round, and reactive to light.   Neck: Normal range of motion. Neck supple.   Cardiovascular: Normal rate, regular rhythm, normal heart sounds and intact distal pulses. Exam reveals no gallop and no friction rub.    No murmur heard.  Pulmonary/Chest: Breath sounds normal. No respiratory distress. She has no wheezes. She has no rhonchi. She has no rales. She exhibits no tenderness.   Abdominal: Soft. Bowel sounds are normal. She exhibits no distension and no mass. There is tenderness. There is no rebound and no guarding.   Tenderness to palpation in RLQ without rebound or guarding. Negative psoas and obturator signs. Street sign negative.   Musculoskeletal: Normal range of motion.   Neurological: She is alert and oriented to person, place, and time.   Skin: Skin is warm and dry.   Psychiatric: She has a normal mood and affect.         ED Course   Procedures  Labs Reviewed   CBC W/ AUTO DIFFERENTIAL - Abnormal;  Notable for the following components:       Result Value    RBC 3.37 (*)     Hemoglobin 10.8 (*)     Hematocrit 35.6 (*)     Mean Corpuscular Volume 106 (*)     Mean Corpuscular Hemoglobin 32.0 (*)     Mean Corpuscular Hemoglobin Conc 30.3 (*)     All other components within normal limits   COMPREHENSIVE METABOLIC PANEL - Abnormal; Notable for the following components:    Potassium 5.6 (*)     Chloride 94 (*)     Glucose 234 (*)     BUN, Bld 57 (*)     Creatinine 7.3 (*)     ALT 9 (*)     eGFR if  6.4 (*)     eGFR if non  5.5 (*)     All other components within normal limits   LIPASE - Abnormal; Notable for the following components:    Lipase 64 (*)     All other components within normal limits   POCT GLUCOSE - Abnormal; Notable for the following components:    POCT Glucose 177 (*)     All other components within normal limits   LACTIC ACID, PLASMA   MAGNESIUM   MAGNESIUM    Narrative:     ADD ON MG [id number] PER JASSI PAULINO PA-C  14:19  07/05/2019    PHOSPHORUS   POCT GLUCOSE MONITORING CONTINUOUS     EKG Readings: (Independently Interpreted)   Initial Reading: No STEMI. Previous EKG: Compared with most recent EKG Previous EKG Date: 6/21/2019. Rhythm: Normal Sinus Rhythm. Heart Rate: 75. Ectopy: No Ectopy. Conduction: 1st Degree AV Block. T Waves: Normal. Clinical Impression: Normal Sinus Rhythm       Imaging Results          X-Ray Chest PA And Lateral (Final result)  Result time 07/05/19 15:02:35    Final result by Dallas Cisneros MD (07/05/19 15:02:35)                 Impression:      See above      Electronically signed by: Dallas Cisneros MD  Date:    07/05/2019  Time:    15:02             Narrative:    EXAMINATION:  XR CHEST PA AND LATERAL    CLINICAL HISTORY:  hypoxia;    TECHNIQUE:  PA and lateral views of the chest were performed.    COMPARISON:  Non 06/13/2019 e    FINDINGS:  Mild cardiomegaly.  No significant airspace consolidation or pleural effusion identified.  Small  subsegmental atelectatic changes noted at the right lung base.                               CT Abdomen Pelvis With Contrast (Final result)  Result time 07/05/19 12:22:35    Final result by Hadley Lua Jr., MD (07/05/19 12:22:35)                 Impression:      Several small layering gallstones without evidence of cholecystitis.    Calcification in the expected course of the mid right ureter that is likely vascular calcification in the right ovarian vein.  No hydronephrosis.  Correlation with clinical findings would be helpful.    Bowel demonstrates no inflammatory or obstructive process.  Appendix appears unremarkable.    Small fat containing ventral wall hernia.    Irregular opacities at the lung bases likely atelectasis.  Developing infiltrate less likely but not excluded.    Electronically signed by resident: Hadley Brizuela  Date:    07/05/2019  Time:    10:44    Electronically signed by: Hadley Lua MD  Date:    07/05/2019  Time:    12:22             Narrative:    EXAMINATION:  CT ABDOMEN PELVIS WITH CONTRAST    CLINICAL HISTORY:  RLQ pain, appendicitis suspected    TECHNIQUE:  Low dose axial images, sagittal and coronal reformations were obtained from the lung bases to the pubic symphysis following the IV administration of 100 mL of Omnipaque 350 .  Oral contrast was not given.    COMPARISON:  CT renal stone 06/21/2019.    FINDINGS:  Heart: Normal in size. No pericardial effusion. Multivessel coronary atherosclerosis.  Calcification at the aortic valve annulus and mitral valve apparatus.    Lung Bases: Bibasilar band like opacities, likely subsegmental atelectasis.  No focal consolidation no pleural effusion.    Liver: Liver size is upper limits of normal.  No focal hepatic lesions.  Portal vein is patent.    Gallbladder: There are several small calcified layering gallstones.  Gallbladder wall is unremarkable and there is no pericholecystic fluid to indicate acute cholecystitis..    Bile Ducts: No  evidence of dilated ducts.    Pancreas: No mass or peripancreatic fat stranding.    Spleen: Several punctate splenic calcifications, likely old granulomatous disease.    Adrenals: Unremarkable.    Kidneys/ Ureters: Bilateral kidneys are mildly atrophic with cortical thinning, consistent with medical renal disease.  There are no stones visualized in the collecting systems, but there is a punctate calcification seen along the course of the mid right ureter that is favored to represent a ovarian vein calcification (axial series 2, image 62).  No hydronephrosis.  No definite ureteral stones or nephrolithiasis.    Bladder: No evidence of wall thickening.    Reproductive organs: Status post hysterectomy.    GI Tract/Mesentery: No evidence of bowel obstruction or inflammation. The appendix is unremarkable.    Peritoneal Space: No ascites. No free air.    Retroperitoneum:  No significant adenopathy.    Abdominal wall:  There is a fat containing ventral wall hernia with a 1.4 cm fascial defect.    Vasculature: Scattered calcification throughout the abdominal aorta and major branch vessels.  No high-grade stenosis or occlusion.    Bones: No acute fracture. Mild-to-moderate multilevel degenerative changes of the spine.                                 Medical Decision Making:   History:   Old Medical Records: I decided to obtain old medical records.  Clinical Tests:   Lab Tests: Ordered and Reviewed  Radiological Study: Ordered and Reviewed  Medical Tests: Ordered and Reviewed       APC / Resident Notes:   58-year-old female presenting for right lower quadrant abdominal pain. On ED arrival, her vitals are normal, her abdomen is soft with normoactive bowel sounds, tender to palpation in RLQ without rebound or guarding. Per chart review, the patient was recently admitted 6/21 for hematuria, uremia and hyperkalemia.  She had a CT renal stone study performed for pain similar to her pain today.  CT showed cholelithiasis with wall  thickening, however no cholecystitis detected on ultrasound.  Differential diagnosis includes appendicitis, cholecystitis, right-sided diverticulitis.  Lower suspicion for hyperkalemia or electrolyte derangement given patient's dialysis 2 days ago.  Check labs, give MSIR for pain, do CT abdomen pelvis and reassess.    Lab workup is unrevealing.  Mild hyperkalemia with potassium of 5.6.  Lipase slightly elevated at 64.  CT shows gallstones with no signs of cholecystitis but no clear cause of the patient's pain. Given reassuring workup, do not believe she requires further ED treatment, however she will require dialysis.  Patient is normally scheduled to be dialyzed at noon, will call her dialysis facility to see if she is able to have an appointment later today.    I contacted [facility] dialysis facility on [address], unfortunately they close at 1:00 p.m. and the patient will not be able to be dialyzed today.  Will admit for observation for dialysis.  Patient comfortable with admission.  I discussed this patient my supervising physician.    Josephine Mueller PA-C                     Clinical Impression:       ICD-10-CM ICD-9-CM   1. Hyperkalemia E87.5 276.7   2. Hemodialysis patient Z99.2 V45.11   3. Abdominal pain, unspecified abdominal location R10.9 789.00   4. Chest pain R07.9 786.50   5. CHF (congestive heart failure) I50.9 428.0   6. Acute respiratory failure with hypoxia J96.01 518.81         Disposition:   Disposition: Placed in Observation  Condition: Fair                        Josephine Mueller PA-C  07/05/19 [id number]

## 2019-07-06 ENCOUNTER — ANESTHESIA EVENT (OUTPATIENT)
Dept: OBSERVATION | Facility: HOSPITAL | Age: 59
DRG: 628 | End: 2019-07-06
Payer: MEDICARE

## 2019-07-06 ENCOUNTER — ANESTHESIA (OUTPATIENT)
Dept: OBSERVATION | Facility: HOSPITAL | Age: 59
DRG: 628 | End: 2019-07-06
Payer: MEDICARE

## 2019-07-06 PROBLEM — R10.31 RIGHT LOWER QUADRANT ABDOMINAL PAIN: Status: ACTIVE | Noted: 2019-07-06

## 2019-07-06 PROBLEM — I69.359 HEMIPLEGIA AS LATE EFFECT OF STROKE: Status: ACTIVE | Noted: 2018-01-03

## 2019-07-06 PROBLEM — T82.590A AV GRAFT MALFUNCTION: Status: ACTIVE | Noted: 2019-07-06

## 2019-07-06 LAB
ALBUMIN SERPL BCP-MCNC: 3.1 G/DL (ref 3.5–5.2)
ALBUMIN SERPL BCP-MCNC: 3.2 G/DL (ref 3.5–5.2)
ALBUMIN SERPL BCP-MCNC: 3.2 G/DL (ref 3.5–5.2)
ANION GAP SERPL CALC-SCNC: 11 MMOL/L (ref 8–16)
ANION GAP SERPL CALC-SCNC: 13 MMOL/L (ref 8–16)
ANION GAP SERPL CALC-SCNC: 16 MMOL/L (ref 8–16)
ANION GAP SERPL CALC-SCNC: 18 MMOL/L (ref 8–16)
ASCENDING AORTA: 2.86 CM
AV INDEX (PROSTH): 0.96
AV MEAN GRADIENT: 4 MMHG
AV PEAK GRADIENT: 7 MMHG
AV VALVE AREA: 3.18 CM2
AV VELOCITY RATIO: 0.74
BASOPHILS # BLD AUTO: 0.05 K/UL (ref 0–0.2)
BASOPHILS NFR BLD: 0.7 % (ref 0–1.9)
BSA FOR ECHO PROCEDURE: 2.11 M2
BUN SERPL-MCNC: 58 MG/DL (ref 6–20)
BUN SERPL-MCNC: 69 MG/DL (ref 6–20)
BUN SERPL-MCNC: 71 MG/DL (ref 6–20)
BUN SERPL-MCNC: 73 MG/DL (ref 6–20)
CALCIUM SERPL-MCNC: 9.1 MG/DL (ref 8.7–10.5)
CALCIUM SERPL-MCNC: 9.2 MG/DL (ref 8.7–10.5)
CALCIUM SERPL-MCNC: 9.4 MG/DL (ref 8.7–10.5)
CALCIUM SERPL-MCNC: 9.6 MG/DL (ref 8.7–10.5)
CHLORIDE SERPL-SCNC: 93 MMOL/L (ref 95–110)
CHLORIDE SERPL-SCNC: 94 MMOL/L (ref 95–110)
CHLORIDE SERPL-SCNC: 95 MMOL/L (ref 95–110)
CHLORIDE SERPL-SCNC: 95 MMOL/L (ref 95–110)
CO2 SERPL-SCNC: 25 MMOL/L (ref 23–29)
CO2 SERPL-SCNC: 26 MMOL/L (ref 23–29)
CO2 SERPL-SCNC: 27 MMOL/L (ref 23–29)
CO2 SERPL-SCNC: 31 MMOL/L (ref 23–29)
CREAT SERPL-MCNC: 7.2 MG/DL (ref 0.5–1.4)
CREAT SERPL-MCNC: 8.4 MG/DL (ref 0.5–1.4)
CREAT SERPL-MCNC: 8.4 MG/DL (ref 0.5–1.4)
CREAT SERPL-MCNC: 8.8 MG/DL (ref 0.5–1.4)
CV ECHO LV RWT: 0.45 CM
DIFFERENTIAL METHOD: ABNORMAL
DOP CALC AO PEAK VEL: 1.36 M/S
DOP CALC AO VTI: 19.79 CM
DOP CALC LVOT AREA: 3.3 CM2
DOP CALC LVOT DIAMETER: 2.06 CM
DOP CALC LVOT PEAK VEL: 1.01 M/S
DOP CALC LVOT STROKE VOLUME: 62.99 CM3
DOP CALCLVOT PEAK VEL VTI: 18.91 CM
E WAVE DECELERATION TIME: 249.06 MSEC
E/A RATIO: 0.67
E/E' RATIO: 14.6 M/S
ECHO LV POSTERIOR WALL: 0.93 CM (ref 0.6–1.1)
EOSINOPHIL # BLD AUTO: 0.2 K/UL (ref 0–0.5)
EOSINOPHIL NFR BLD: 3.4 % (ref 0–8)
ERYTHROCYTE [DISTWIDTH] IN BLOOD BY AUTOMATED COUNT: 12.7 % (ref 11.5–14.5)
EST. GFR  (AFRICAN AMERICAN): 5.1 ML/MIN/1.73 M^2
EST. GFR  (AFRICAN AMERICAN): 5.4 ML/MIN/1.73 M^2
EST. GFR  (AFRICAN AMERICAN): 5.4 ML/MIN/1.73 M^2
EST. GFR  (AFRICAN AMERICAN): 6.5 ML/MIN/1.73 M^2
EST. GFR  (NON AFRICAN AMERICAN): 4.4 ML/MIN/1.73 M^2
EST. GFR  (NON AFRICAN AMERICAN): 4.7 ML/MIN/1.73 M^2
EST. GFR  (NON AFRICAN AMERICAN): 4.7 ML/MIN/1.73 M^2
EST. GFR  (NON AFRICAN AMERICAN): 5.6 ML/MIN/1.73 M^2
FRACTIONAL SHORTENING: 30 % (ref 28–44)
GLUCOSE SERPL-MCNC: 113 MG/DL (ref 70–110)
GLUCOSE SERPL-MCNC: 178 MG/DL (ref 70–110)
GLUCOSE SERPL-MCNC: 182 MG/DL (ref 70–110)
GLUCOSE SERPL-MCNC: 205 MG/DL (ref 70–110)
HCT VFR BLD AUTO: 31.3 % (ref 37–48.5)
HGB BLD-MCNC: 9.4 G/DL (ref 12–16)
IMM GRANULOCYTES # BLD AUTO: 0.02 K/UL (ref 0–0.04)
IMM GRANULOCYTES NFR BLD AUTO: 0.3 % (ref 0–0.5)
INTERVENTRICULAR SEPTUM: 0.93 CM (ref 0.6–1.1)
IVRT: 0.16 MSEC
LA MAJOR: 6.16 CM
LA MINOR: 6.2 CM
LA WIDTH: 4.53 CM
LEFT ATRIUM SIZE: 4.61 CM
LEFT ATRIUM VOLUME INDEX: 54.5 ML/M2
LEFT ATRIUM VOLUME: 109.7 CM3
LEFT INTERNAL DIMENSION IN SYSTOLE: 2.88 CM (ref 2.1–4)
LEFT VENTRICLE DIASTOLIC VOLUME INDEX: 37.31 ML/M2
LEFT VENTRICLE DIASTOLIC VOLUME: 75.14 ML
LEFT VENTRICLE MASS INDEX: 60 G/M2
LEFT VENTRICLE SYSTOLIC VOLUME INDEX: 15.8 ML/M2
LEFT VENTRICLE SYSTOLIC VOLUME: 31.77 ML
LEFT VENTRICULAR INTERNAL DIMENSION IN DIASTOLE: 4.12 CM (ref 3.5–6)
LEFT VENTRICULAR MASS: 120.34 G
LV LATERAL E/E' RATIO: 14.6 M/S
LV SEPTAL E/E' RATIO: 14.6 M/S
LYMPHOCYTES # BLD AUTO: 1.2 K/UL (ref 1–4.8)
LYMPHOCYTES NFR BLD: 17.6 % (ref 18–48)
MAGNESIUM SERPL-MCNC: 2.4 MG/DL (ref 1.6–2.6)
MCH RBC QN AUTO: 31.9 PG (ref 27–31)
MCHC RBC AUTO-ENTMCNC: 30 G/DL (ref 32–36)
MCV RBC AUTO: 106 FL (ref 82–98)
MONOCYTES # BLD AUTO: 0.5 K/UL (ref 0.3–1)
MONOCYTES NFR BLD: 6.7 % (ref 4–15)
MV PEAK A VEL: 1.09 M/S
MV PEAK E VEL: 0.73 M/S
NEUTROPHILS # BLD AUTO: 5 K/UL (ref 1.8–7.7)
NEUTROPHILS NFR BLD: 71.3 % (ref 38–73)
NRBC BLD-RTO: 0 /100 WBC
PHOSPHATE SERPL-MCNC: 5.1 MG/DL (ref 2.7–4.5)
PHOSPHATE SERPL-MCNC: 6.2 MG/DL (ref 2.7–4.5)
PHOSPHATE SERPL-MCNC: 6.3 MG/DL (ref 2.7–4.5)
PISA TR MAX VEL: 2.99 M/S
PLATELET # BLD AUTO: 207 K/UL (ref 150–350)
PMV BLD AUTO: 9.7 FL (ref 9.2–12.9)
POCT GLUCOSE: 128 MG/DL (ref 70–110)
POCT GLUCOSE: 205 MG/DL (ref 70–110)
POCT GLUCOSE: 216 MG/DL (ref 70–110)
POCT GLUCOSE: 221 MG/DL (ref 70–110)
POTASSIUM SERPL-SCNC: 5.3 MMOL/L (ref 3.5–5.1)
POTASSIUM SERPL-SCNC: 6.2 MMOL/L (ref 3.5–5.1)
POTASSIUM SERPL-SCNC: 6.2 MMOL/L (ref 3.5–5.1)
POTASSIUM SERPL-SCNC: 6.6 MMOL/L (ref 3.5–5.1)
POTASSIUM SERPL-SCNC: 6.6 MMOL/L (ref 3.5–5.1)
POTASSIUM SERPL-SCNC: 7.5 MMOL/L (ref 3.5–5.1)
PULM VEIN S/D RATIO: 1.5
PV PEAK D VEL: 0.32 M/S
PV PEAK S VEL: 0.48 M/S
RA MAJOR: 4.62 CM
RA PRESSURE: 3 MMHG
RA WIDTH: 3.87 CM
RBC # BLD AUTO: 2.95 M/UL (ref 4–5.4)
RIGHT VENTRICULAR END-DIASTOLIC DIMENSION: 3.24 CM
RV TISSUE DOPPLER FREE WALL SYSTOLIC VELOCITY 1 (APICAL 4 CHAMBER VIEW): 15.21 CM/S
SINUS: 2.86 CM
SODIUM SERPL-SCNC: 135 MMOL/L (ref 136–145)
SODIUM SERPL-SCNC: 135 MMOL/L (ref 136–145)
SODIUM SERPL-SCNC: 137 MMOL/L (ref 136–145)
SODIUM SERPL-SCNC: 137 MMOL/L (ref 136–145)
STJ: 2.54 CM
TDI LATERAL: 0.05 M/S
TDI SEPTAL: 0.05 M/S
TDI: 0.05 M/S
TR MAX PG: 36 MMHG
TRICUSPID ANNULAR PLANE SYSTOLIC EXCURSION: 1.56 CM
TV REST PULMONARY ARTERY PRESSURE: 39 MMHG
WBC # BLD AUTO: 6.97 K/UL (ref 3.9–12.7)

## 2019-07-06 PROCEDURE — 80069 RENAL FUNCTION PANEL: CPT | Mod: 91,HCNC,NTX

## 2019-07-06 PROCEDURE — 76937: ICD-10-PCS | Mod: 26,HCNC,NTX, | Performed by: ANESTHESIOLOGY

## 2019-07-06 PROCEDURE — 93010 EKG 12-LEAD: ICD-10-PCS | Mod: HCNC,NTX,, | Performed by: INTERNAL MEDICINE

## 2019-07-06 PROCEDURE — 80100014 HC HEMODIALYSIS 1:1: Mod: HCNC,NTX

## 2019-07-06 PROCEDURE — 85025 COMPLETE CBC W/AUTO DIFF WBC: CPT | Mod: HCNC,NTX

## 2019-07-06 PROCEDURE — 25000003 PHARM REV CODE 250: Mod: HCNC,NTX | Performed by: HOSPITALIST

## 2019-07-06 PROCEDURE — 80048 BASIC METABOLIC PNL TOTAL CA: CPT | Mod: HCNC,NTX

## 2019-07-06 PROCEDURE — 99214 PR OFFICE/OUTPT VISIT, EST, LEVL IV, 30-39 MIN: ICD-10-PCS | Mod: HCNC,NTX,, | Performed by: NURSE PRACTITIONER

## 2019-07-06 PROCEDURE — 36415 COLL VENOUS BLD VENIPUNCTURE: CPT | Mod: HCNC,NTX

## 2019-07-06 PROCEDURE — 11000001 HC ACUTE MED/SURG PRIVATE ROOM: Mod: HCNC,NTX

## 2019-07-06 PROCEDURE — 25000003 PHARM REV CODE 250: Mod: HCNC,NTX | Performed by: PHYSICIAN ASSISTANT

## 2019-07-06 PROCEDURE — 36556 INSERT NON-TUNNEL CV CATH: CPT | Mod: HCNC,NTX,, | Performed by: ANESTHESIOLOGY

## 2019-07-06 PROCEDURE — 99233 PR SUBSEQUENT HOSPITAL CARE,LEVL III: ICD-10-PCS | Mod: HCNC,NTX,, | Performed by: HOSPITALIST

## 2019-07-06 PROCEDURE — 76937 US GUIDE VASCULAR ACCESS: CPT | Mod: HCNC,NTX | Performed by: ANESTHESIOLOGY

## 2019-07-06 PROCEDURE — 36556: ICD-10-PCS | Mod: HCNC,NTX,, | Performed by: ANESTHESIOLOGY

## 2019-07-06 PROCEDURE — 63600175 PHARM REV CODE 636 W HCPCS: Mod: HCNC,NTX | Performed by: HOSPITALIST

## 2019-07-06 PROCEDURE — 99233 SBSQ HOSP IP/OBS HIGH 50: CPT | Mod: HCNC,NTX,, | Performed by: HOSPITALIST

## 2019-07-06 PROCEDURE — 99214 OFFICE O/P EST MOD 30 MIN: CPT | Mod: HCNC,NTX,, | Performed by: NURSE PRACTITIONER

## 2019-07-06 PROCEDURE — 80069 RENAL FUNCTION PANEL: CPT | Mod: HCNC,NTX

## 2019-07-06 PROCEDURE — 83735 ASSAY OF MAGNESIUM: CPT | Mod: HCNC,NTX

## 2019-07-06 PROCEDURE — 93005 ELECTROCARDIOGRAM TRACING: CPT | Mod: HCNC,NTX

## 2019-07-06 PROCEDURE — 93010 ELECTROCARDIOGRAM REPORT: CPT | Mod: HCNC,NTX,, | Performed by: INTERNAL MEDICINE

## 2019-07-06 RX ORDER — ALBUTEROL SULFATE 2.5 MG/.5ML
2.5 SOLUTION RESPIRATORY (INHALATION) EVERY 4 HOURS PRN
Status: DISCONTINUED | OUTPATIENT
Start: 2019-07-06 | End: 2019-07-11 | Stop reason: HOSPADM

## 2019-07-06 RX ORDER — FUROSEMIDE 10 MG/ML
160 INJECTION INTRAMUSCULAR; INTRAVENOUS ONCE
Status: COMPLETED | OUTPATIENT
Start: 2019-07-06 | End: 2019-07-06

## 2019-07-06 RX ORDER — SODIUM CHLORIDE 9 MG/ML
INJECTION, SOLUTION INTRAVENOUS
Status: DISCONTINUED | OUTPATIENT
Start: 2019-07-06 | End: 2019-07-11 | Stop reason: HOSPADM

## 2019-07-06 RX ORDER — MORPHINE SULFATE 2 MG/ML
2 INJECTION, SOLUTION INTRAMUSCULAR; INTRAVENOUS EVERY 6 HOURS PRN
Status: DISCONTINUED | OUTPATIENT
Start: 2019-07-06 | End: 2019-07-07

## 2019-07-06 RX ADMIN — POLYETHYLENE GLYCOL 3350 17 G: 17 POWDER, FOR SOLUTION ORAL at 09:07

## 2019-07-06 RX ADMIN — CALCIUM GLUCONATE 1 G: 94 INJECTION, SOLUTION INTRAVENOUS at 09:07

## 2019-07-06 RX ADMIN — FOLIC ACID 1 MG: 1 TABLET ORAL at 08:07

## 2019-07-06 RX ADMIN — FAMOTIDINE 20 MG: 20 TABLET, FILM COATED ORAL at 08:07

## 2019-07-06 RX ADMIN — TRAMADOL HYDROCHLORIDE 50 MG: 50 TABLET, FILM COATED ORAL at 08:07

## 2019-07-06 RX ADMIN — INSULIN ASPART 2 UNITS: 100 INJECTION, SOLUTION INTRAVENOUS; SUBCUTANEOUS at 04:07

## 2019-07-06 RX ADMIN — ASPIRIN 81 MG: 81 TABLET, COATED ORAL at 06:07

## 2019-07-06 RX ADMIN — TRAMADOL HYDROCHLORIDE 50 MG: 50 TABLET, FILM COATED ORAL at 09:07

## 2019-07-06 RX ADMIN — MORPHINE SULFATE 2 MG: 2 INJECTION, SOLUTION INTRAMUSCULAR; INTRAVENOUS at 10:07

## 2019-07-06 RX ADMIN — SEVELAMER CARBONATE 800 MG: 800 TABLET, FILM COATED ORAL at 04:07

## 2019-07-06 RX ADMIN — SODIUM BICARBONATE 650 MG TABLET 1300 MG: at 09:07

## 2019-07-06 RX ADMIN — INSULIN HUMAN 10 UNITS: 100 INJECTION, SOLUTION PARENTERAL at 06:07

## 2019-07-06 RX ADMIN — SEVELAMER CARBONATE 800 MG: 800 TABLET, FILM COATED ORAL at 08:07

## 2019-07-06 RX ADMIN — LEVETIRACETAM 500 MG: 500 TABLET, FILM COATED ORAL at 02:07

## 2019-07-06 RX ADMIN — LEVETIRACETAM 500 MG: 500 TABLET, FILM COATED ORAL at 09:07

## 2019-07-06 RX ADMIN — BUPROPION HYDROCHLORIDE 300 MG: 150 TABLET, EXTENDED RELEASE ORAL at 08:07

## 2019-07-06 RX ADMIN — SEVELAMER CARBONATE 800 MG: 800 TABLET, FILM COATED ORAL at 12:07

## 2019-07-06 RX ADMIN — ATORVASTATIN CALCIUM 40 MG: 20 TABLET, FILM COATED ORAL at 09:07

## 2019-07-06 RX ADMIN — FUROSEMIDE 40 MG: 40 TABLET ORAL at 08:07

## 2019-07-06 RX ADMIN — DEXTROSE 250 ML: 10 SOLUTION INTRAVENOUS at 06:07

## 2019-07-06 RX ADMIN — FUROSEMIDE 160 MG: 10 INJECTION, SOLUTION INTRAMUSCULAR; INTRAVENOUS at 07:07

## 2019-07-06 RX ADMIN — INSULIN ASPART 2 UNITS: 100 INJECTION, SOLUTION INTRAVENOUS; SUBCUTANEOUS at 08:07

## 2019-07-06 RX ADMIN — LEVETIRACETAM 500 MG: 500 TABLET, FILM COATED ORAL at 06:07

## 2019-07-06 RX ADMIN — SODIUM BICARBONATE 650 MG TABLET 1300 MG: at 08:07

## 2019-07-06 NOTE — CONSULTS
[facility]  Vascular Surgery  Consult Note    Inpatient consult to Vascular Surgery  Consult performed by: Jeanna Mejia MD  Consult ordered by: Devante Vasquez MD        Subjective:     Chief Complaint/Reason for Admission: Abdominal pain, vascular consult for AVG malfunction    History of Present Illness: Ms. Perez is a 59yo F with a history of HTN, HLD, L hemiplegia after large stroke in 2016, DM II, stage V CKD since August 2018 on HD MWF via RUE AVG. She has a residual impairment on her left upper extremity and left lower extremity from her stroke and is currently wheel chair bound. She has complete function of her right arm currently. She is right handed. No history of trauma to right arm.      She underwent declot procedure on 6/20/19 where she was noted to have successful declot of thrombosed AV graft but persistent right brachiocephalic vein occlusion without recanalization attempt given prolonged bradycardia following thrombectomy. She has been undergoing dialysis per her schedule since the procedure but has not since followed up with us in clinic. She presented to the hospital overnight given abdominal pain and was admitted to the medicine team. She was noted to have low flows, high arterial pressures and pain with dialysis this morning and as such vascular surgery was consulted for evaluation. She did have a arterial ultrasound which demonstrated patency of the graft.     Facility-Administered Medications Prior to Admission   Medication Dose Route Frequency Provider Last Rate Last Dose    onabotulinumtoxina injection 300 Units  300 Units Intramuscular 1 time in Clinic/HOD Monty Pardo MD         Medications Prior to Admission   Medication Sig Dispense Refill Last Dose    albuterol (PROVENTIL) 2.5 mg /3 mL (0.083 %) nebulizer solution Take 3 mLs (2.5 mg total) by nebulization every 6 (six) hours as needed for Wheezing. Rescue 25 each 3 Past Week at Unknown time     albuterol (VENTOLIN HFA) 90 mcg/actuation inhaler Inhale 2 puffs into the lungs every 6 (six) hours as needed for Wheezing. Rescue 18 g 0 Past Week at Unknown time    aspirin (ECOTRIN) 81 MG EC tablet Take 81 mg by mouth every morning.    7/5/2019 at Unknown time    atorvastatin (LIPITOR) 40 MG tablet TAKE 1 TABLET ONE TIME DAILY FOR CHOLESTEROL 90 tablet 2 7/4/2019 at Unknown time    buPROPion (WELLBUTRIN XL) 300 MG 24 hr tablet Take 1 tablet (300 mg total) by mouth once daily. 30 tablet 6 7/4/2019 at Unknown time    carvedilol (COREG) 25 MG tablet Take 1 tablet (25 mg total) by mouth 2 (two) times daily. 180 tablet 2 7/4/2019 at Unknown time    DULCOLAX, BISACODYL, ORAL Take by mouth as needed (constipation).   Past Month at Unknown time    famotidine (PEPCID) 20 MG tablet Take 1 tablet (20 mg total) by mouth once daily. 90 tablet 2 7/4/2019 at Unknown time    folic acid (FOLVITE) 1 MG tablet Take 1 tablet (1 mg total) by mouth once daily. 90 tablet 2 7/5/2019 at Unknown time    furosemide (LASIX) 40 MG tablet Take 1 tablet (40 mg total) by mouth once daily. (Patient taking differently: Take 40 mg by mouth every morning. ) 90 tablet 2 7/5/2019 at Unknown time    guaiFENesin (MUCINEX) 1,200 mg Ta12 1 tab every 12 hours as needed for congestion 24 tablet 0 Past Month at Unknown time    insulin glargine (LANTUS U-100 INSULIN) 100 unit/mL injection 10 units daily in the PM 10 mL 6 7/4/2019 at Unknown time    levETIRAcetam (KEPPRA) 500 MG Tab Take 1 tablet (500 mg total) by mouth every 8 (eight) hours. 270 tablet 2 7/5/2019 at Unknown time    polyethylene glycol 3350 (MIRALAX ORAL) Take by mouth every evening.    7/4/2019 at Unknown time    sevelamer carbonate (RENVELA) 800 mg Tab Take 800 mg by mouth 3 (three) times daily with meals.   7/4/2019 at Unknown time    sodium bicarbonate 650 MG tablet Take 2 tablets (1,300 mg total) by mouth 2 (two) times daily. 360 tablet 2 7/4/2019 at Unknown time     "traMADol (ULTRAM) 50 mg tablet 1 tab Q6-8 hours as needed for joint pain 30 tablet 0 Past Week at Unknown time    acetaminophen (TYLENOL) 325 MG tablet Take 2 tablets (650 mg total) by mouth every 6 (six) hours as needed for Pain.  0 More than a month at Unknown time    ALPRAZolam (XANAX) 0.5 MG tablet 1 tab Every 8 hours as needed for anxiety 30 tablet 0 More than a month at Unknown time    amoxicillin-clavulanate 500-125mg (AUGMENTIN) 500-125 mg Tab Take 1 tablet (500 mg total) by mouth once daily. 10 tablet 0     BD INSULIN PEN NEEDLE UF SHORT 31 gauge x 5/16" Ndle USE TO INJECT NOVOLOG FLEXPEN BEFORE MEALS 150 each 11 Taking    blood sugar diagnostic (ACCU-CHEK SMARTVIEW TEST STRIP) Strp 1 strip by Misc.(Non-Drug; Combo Route) route 2 (two) times daily. 300 each 3 6/19/2019 at Unknown time    ciprofloxacin HCl (CILOXAN) 0.3 % ophthalmic solution Place 2 drops into both eyes every 2 (two) hours. 2 drops/affected eye 3-4x/day x 3-5 days 5 mL 0 6/1/2019    dantrolene (DANTRIUM) 50 MG Cap Take 1 capsule (50 mg total) by mouth 3 (three) times daily. 270 capsule 3 More than a month at Unknown time    ergocalciferol (ERGOCALCIFEROL) 50,000 unit Cap 1 capsule (50,000 Units total) by Per G Tube route every 7 days. (Patient taking differently: Take 50,000 Units by mouth every 7 days. Takes on Thurs) 12 capsule 2 7/4/2019    ferrous sulfate 220 mg (44 mg iron)/5 mL solution 10ml daily for Iron/Give via PEG (Patient taking differently: Take 220 mg by mouth every morning. ) 300 mL 6 Taking    flash glucose scanning reader (FREESTYLE JOSÉ LUIS 14 DAY READER) Misc Use as directed. Scan 4 times a day. 1 each 0 Taking    flash glucose sensor (FREESTYLE JOSÉ LUIS 14 DAY SENSOR) Kit Change every 14 days. 2 kit 11 Taking    lidocaine-prilocaine (EMLA) cream Apply topically as needed. 30 g 1 More than a month at Unknown time    pregabalin (LYRICA) 25 MG capsule 1 cap in AM and 2 caps at Bedtime for muscle spasm 90 capsule 4 " 6/20/2019 at 0830       Review of patient's allergies indicates:   Allergen Reactions    Lisinopril Other (See Comments)     Angioedema      Vicodin [hydrocodone-acetaminophen] Rash     No problem with acetaminophen        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    Cortical cataract of both eyes 7/26/2016    Decubitus ulcer of buttock, stage 2     Degeneration of lumbar or lumbosacral intervertebral disc 3/5/2013    S/p MRI L-spine 5/2009     Depression     Encounter for blood transfusion     ESRD on hemodialysis     Started August 2018    General anesthetics causing adverse effect in therapeutic use     Hemorrhagic cerebrovascular accident (CVA)     8/2016 s/p [facility]raniot[place] [facility] [place] hemiparesis    Hemorrhagic cerebrovascular accident (CVA) 1/3/2018    History of stroke 6/28/2017    s/p R-MCA stroke with R-putaminal hemorrhagic transformation in 8/2016 and 11/2016 (s/p [place]raniot[place] [facility] with residual L hemiparesis, on AED s/p CVA      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    Hyperlipidemia     Hypertensive retinopathy of both eyes 7/26/2016    Impingement syndrome of right shoulder 7/2/2014    Left ventricular diastolic dysfunction with preserved systolic function 12/15/2015    Mixed anxiety and depressive disorder 3/5/2013    Obesity     THERESA (obstructive sleep apnea) 3/5/2013    No Home CPAP 2ndary to cost     Partial symptomatic epilepsy with complex partial seizures, not intractable, without status epilepticus     PEG (percutaneous endoscopic gastrostomy) status 6/28/2017    Renovascular hypertension 3/5/2013    Will resume home medications: amlodipine,  labetalol, and hydralazine Will hold Lisinopril in setting of DARLING.   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 without macular edema, without long-term current use of insulin 8/2/2017    Type 2 diabetes mellitus with diabetic polyneuropathy, without long-term current use of insulin 10/22/2015    Type 2 diabetes mellitus with stage 3 chronic kidney disease, without long-term current use of insulin 10/22/2015    Vertebral artery stenosis 3/5/2013    S/p Stenting per Dr Burnett      Past Surgical History:   Procedure Laterality Date    ARTHROSCOPY-SHOULDER WITH SUBACROMIAL DECOMPRESSION Right 7/9/2014    Performed by Kendall Pantoja [facility] at [facility] OR    AV GRAFT CREATION Right 10/18/2018    Performed by Meir Valencia MD at [facility] OR 2ND FLR    Biceps Tenotomy Right 7/9/2014    Performed by [facility]all [facility]antoja [facility] at [facility] OR    BREAST SURGERY      breast reduction    COLONOSCOPY N/A 8/11/2016    Performed by Jerry Vilchis MD at [facility] ENDO (4TH FLR)    DEBRIDEMENT-SHOULDER-ARTHROSCOPIC Labral Cuff Right 7/9/2014    Performed by Kendall [facility]antoja [facility] at [facility] OR    DECLOT-GRAFT Right 6/20/2019    Performed by Cabrera Irwin MD at [facility] CATH LAB    ESOPHAGOGASTRODUODENOSCOPY (EGD) N/A 12/6/2017    [facility] by Dallas Lock Jr., [facility] ENDO    SMLCKXTM-HPNNOGVK-IHSUOR END Right 7/9/2014    Performed by Kendall [facility]antoja [facility] at [facility] OR    Fistulogram Right 2/11/2019    Performed by Meir Valencia MD at [facility] CATH LAB    HYSTERECTOMY      ROTATOR CUFF REPAIR Right July 9, 2014    right side    Skull surgery      Aneurysm    stent placed      in vertebral  artery    TOTAL REDUCTION MAMMOPLASTY      TUBAL LIGATION       Family History     Problem Relation (Age of Onset)    Bell's palsy Sister    Blindness Maternal Grandmother    Breast cancer Mother    Cancer Mother (55)    Diabetes Mother, Son,     Heart attack Mother    Heart disease Mother    Hypertension Mother, Sister    Lupus Sister    No Known Problems Daughter    Sleep apnea Sister    Stroke Sister, Maternal Aunt        Tobacco Use    Smoking status: Never Smoker    Smokeless tobacco: Never Used   Substance and Sexual Activity    Alcohol use: Not Currently     Comment: occasional wine cooler     Drug use: No    Sexual activity: Yes     Partners: Male     Review of Systems   Constitutional: Negative for activity change, appetite change, chills, diaphoresis, fatigue, fever and unexpected weight change.   HENT: Negative for congestion, drooling, ear pain, rhinorrhea, sore throat, trouble swallowing and voice change.    Eyes: Negative for photophobia, pain, discharge, redness and visual disturbance.   Respiratory: Positive for wheezing. Negative for cough, chest tightness and shortness of breath.    Cardiovascular: Negative for chest pain, palpitations and leg swelling.   Gastrointestinal: Negative for abdominal distention, abdominal pain, blood in stool, constipation, diarrhea, nausea and vomiting.   Endocrine: Negative for cold intolerance, heat intolerance, polydipsia, polyphagia and polyuria.   Genitourinary: Positive for decreased urine volume (still makes urine). Negative for difficulty urinating, dysuria, flank pain and frequency.   Musculoskeletal: Negative for arthralgias, back pain, myalgias and neck pain.   Skin: Negative for color change, pallor, rash and wound.   Neurological: Negative for dizziness, tremors, syncope, facial asymmetry, speech difficulty, weakness, light-headedness, numbness and headaches.   Hematological: Negative for adenopathy. Does not bruise/bleed easily.    Psychiatric/Behavioral: Negative.  Negative for confusion, dysphoric mood and sleep disturbance. The patient is not nervous/anxious.      Objective:     Vital Signs (Most Recent):  Temp: 97.8 °F (36.6 °C) (07/06/19 1204)  Pulse: 60 (07/06/19 1204)  Resp: 14 (07/06/19 1204)  BP: 127/61 (07/06/19 1204)  SpO2: 99 % (07/06/19 1204) Vital Signs (24h Range):  Temp:  [96.6 °F (35.9 °C)-97.8 °F (36.6 °C)] 97.8 °F (36.6 °C)  Pulse:  [58-69] 60  Resp:  [11-18] 14  SpO2:  [98 %-100 %] 99 %  BP: (100-130)/(53-61) 127/61     Weight: 99.8 kg (220 lb)  Body mass index is 38.97 kg/m².    Physical Exam   Constitutional: She is oriented to person, place, and time. She appears well-developed and well-nourished.   HENT:   Head: Normocephalic and atraumatic.   Eyes: Pupils are equal, round, and reactive to light. EOM are normal.   Neck: Normal range of motion. Neck supple. No tracheal deviation present. No thyromegaly present.   Cardiovascular: Normal rate, regular rhythm and intact distal pulses.   No murmur heard.  Pulmonary/Chest: Effort normal. She has no wheezes.   Crackles at bases b/l R>L   Abdominal: Soft. Bowel sounds are normal. There is tenderness in the right lower quadrant. There is no rebound, no guarding and no CVA tenderness.   Musculoskeletal: Normal range of motion. She exhibits no edema or tenderness.   Lymphadenopathy:     She has no cervical adenopathy.   Neurological: She is alert and oriented to person, place, and time. She has normal reflexes. No cranial nerve deficit.   Skin: Skin is warm and dry.   CAROLE AVG with weak thrill, high pitched bruit.   Psychiatric: She has a normal mood and affect. Her behavior is normal.   Nursing note and vitals reviewed.      Significant Labs:  CBC:   Recent Labs   Lab 07/06/19  0347   WBC 6.97   RBC 2.95*   HGB 9.4*   HCT 31.3*      *   MCH 31.9*   MCHC 30.0*     CMP:   Recent Labs   Lab 07/05/19  0845 07/06/19  0347   * 205*   CALCIUM 10.0 9.4   ALBUMIN 3.7  3.1*   PROT 8.1  --     137   K 5.6* 5.3*   CO2 29 31*   CL 94* 95   BUN 57* 58*   CREATININE 7.3* 7.2*   ALKPHOS 92  --    ALT 9*  --    AST 12  --    BILITOT 0.5  --        Significant Diagnostics:  I have reviewed all pertinent imaging results/findings within the past 24 hours.    Assessment/Plan:     ESRD (end stage renal disease) on dialysis  57yo F with a history of HTN, HLD, L hemiplegia after large stroke in 2016, DM II, stage V CKD since August 2018 on HD MWF via RUE AVG.    - Patient seen and examined, labs and imaging reviewed, discussed with staff  - Recently s/p declot on 6/20 but with continued difficulty at dialysis, as such will plan to perform repeat fistulagram on Monday in cath lab vs OR based on availability  - Discussed with patient, she understands the risks and benefits of the procedure and is amenable, consents signed  - NPO at midnight 7/8  - Remainder of care per primary  - If continued difficulty with dialysis and urgent need from the medicine standpoint, may need central line placement for HD while inpatient   - Please call for questions or concerns        Thank you for your consult. I will follow-up with patient. Please contact us if you have any additional questions.    Jeanna Mejia MD  Vascular Surgery  [facility]    Vascular Attending  Agree with above  Reviewed images from Feb 2019 (PTA outflow by me) and June 2019 (thrombectomy by Dr Irwin).  She had a R brachiocephalic vein stenosis in Feb 2019 but no arm edema; not treated at that time.  In June 2019, recurrent outflow stenosis that seems was treated with PTA only and no central intervention attempted.  Would check RUE AVG u/s in AM  May need PTA outflow and attempt at recanalization of R brachiocephalic vein near occlusion/ and PTAS (e.g. ev3 14mm stent).  Will kindly ask my partner Dr Palm to do in AM as I will be another hospital.    Meir Valencia MD FACS  Vascular/Endovascular  Surgery

## 2019-07-06 NOTE — CARE UPDATE
Full progress note to follow     This Am notified by nephrology NP Alejandro, patient's fistula with clots/high pressures during shortened HD session performed overnight. Weak thrill,   Radiology U/S Fistula performed with patent graft but unable to dialyze patient.     Vascular surgery consulted and plans for Monday fistulogram    Repeat Potassium this afternoon returns critical at 7.5, non-hemolyzed  -repeat potassium to confirm  -begin medical therapy  - insulin IV/D50, kayexalate,  Calcium gluconate.   -160mg IV lasix   -Albuterol Nebs  -serum Bicarb is 27 so will avoid sodium bicarb infusion.   -stat EKG   -BMP q4hrs.   -Discussed case with on-call nephrology Dr. Davidson, recommended medical therapy as above but will need urgent HD overnight, he will notify on-call Hd nurse to be on standby and HD orders will be placed in [facility]   -Contacted Anesthesiology on-call to request for bedside trialysis placement.   -Will notify on-call hospital medicine staff after 7pm of pending plan.         Devante Vasquez M.D.  Attending Physician  [facility] Medicine Dept.  Pager: [phone]  Spectralink -[phone]

## 2019-07-06 NOTE — SUBJECTIVE & OBJECTIVE
Interval History:   HD attempted early this morning, reported by nursing staff that there was difficulty with cannulation with numerous clots removed for needles.  High arterial pressures resulting in low BFR, and patient experiencing pain requiring discontinuation of treatment after 40 minutes.   at the bedside reports her OP unit has difficulty with cannulation as well, and only a few technicians are able to cannulate it without problems.  On exam, high-pitched bruit present to proximal aspect of the graft.     Review of patient's allergies indicates:   Allergen Reactions    Lisinopril Other (See Comments)     Angioedema      Vicodin [hydrocodone-acetaminophen] Rash     No problem with acetaminophen      Current Facility-Administered Medications   Medication Frequency    0.9%  NaCl infusion Once    acetaminophen tablet 650 mg Q4H PRN    albuterol inhaler 2 puff Q6H PRN    ALPRAZolam tablet 0.5 mg Daily PRN    aspirin EC tablet 81 mg QAM    atorvastatin tablet 40 mg QHS    buPROPion TB24 tablet 300 mg Daily    dextrose 10% (D10W) Bolus PRN    dextrose 10% (D10W) Bolus PRN    famotidine tablet 20 mg Daily    folic acid tablet 1 mg Daily    furosemide tablet 40 mg Daily    glucagon (human recombinant) injection 1 mg PRN    glucose chewable tablet 16 g PRN    glucose chewable tablet 24 g PRN    guaiFENesin 12 hr tablet 1,200 mg BID PRN    insulin aspart U-100 pen 0-5 Units QID (AC + HS) PRN    insulin detemir U-100 pen 8 Units QHS    levETIRAcetam tablet 500 mg Q8H    ondansetron injection 4 mg Q12H PRN    polyethylene glycol packet 17 g Q12H PRN    polyethylene glycol packet 17 g QHS    [START ON 7/8/2019] pregabalin capsule 25 mg Every Mon, Wed, Fri    ramelteon tablet 8 mg Nightly PRN    sevelamer carbonate tablet 800 mg TID WM    sodium bicarbonate tablet 1,300 mg BID    sodium chloride 0.9% flush 10 mL PRN    traMADol tablet 50 mg Q8H PRN       Objective:     Vital Signs (Most  Recent):  Temp: 97.5 °F (36.4 °C) (07/06/19 0410)  Pulse: 68 (07/06/19 1124)  Resp: 16 (07/06/19 0747)  BP: (!) 106/54 (07/06/19 0747)  SpO2: 98 % (07/06/19 0747)  O2 Device (Oxygen Therapy): nasal cannula (07/05/19 1617) Vital Signs (24h Range):  Temp:  [96.6 °F (35.9 °C)-97.6 °F (36.4 °C)] 97.5 °F (36.4 °C)  Pulse:  [58-69] 68  Resp:  [11-18] 16  SpO2:  [98 %-100 %] 98 %  BP: (100-130)/(53-61) 106/54     Weight: 99.8 kg (220 lb) (07/05/19 1655)  Body mass index is 38.97 kg/m².  Body surface area is 2.11 meters squared.    No intake/output data recorded.    Physical Exam   Constitutional: She is oriented to person, place, and time. She appears well-developed and well-nourished.   HENT:   Head: Normocephalic and atraumatic.   Eyes: Pupils are equal, round, and reactive to light. EOM are normal.   Neck: Normal range of motion. Neck supple. No tracheal deviation present. No thyromegaly present.   Cardiovascular: Normal rate, regular rhythm and intact distal pulses.   No murmur heard.  Pulmonary/Chest: Effort normal. She has no wheezes.   Crackles at bases b/l R>L   Abdominal: Soft. Bowel sounds are normal. There is tenderness in the right lower quadrant. There is no rebound, no guarding and no CVA tenderness.   Musculoskeletal: Normal range of motion. She exhibits no edema or tenderness.   Lymphadenopathy:     She has no cervical adenopathy.   Neurological: She is alert and oriented to person, place, and time. She has normal reflexes. No cranial nerve deficit.   Skin: Skin is warm and dry.   CAROLE AVG with high pitched bruit.   Psychiatric: She has a normal mood and affect. Her behavior is normal.   Nursing note and vitals reviewed.      Significant Labs:  CBC:   Recent Labs   Lab 07/06/19  0347   WBC 6.97   RBC 2.95*   HGB 9.4*   HCT 31.3*      *   MCH 31.9*   MCHC 30.0*     CMP:   Recent Labs   Lab 07/05/19  0845 07/06/19  0347   * 205*   CALCIUM 10.0 9.4   ALBUMIN 3.7 3.1*   PROT 8.1  --    NA  139 137   K 5.6* 5.3*   CO2 29 31*   CL 94* 95   BUN 57* 58*   CREATININE 7.3* 7.2*   ALKPHOS 92  --    ALT 9*  --    AST 12  --    BILITOT 0.5  --

## 2019-07-06 NOTE — SUBJECTIVE & OBJECTIVE
Facility-Administered Medications Prior to Admission   Medication Dose Route Frequency Provider Last Rate Last Dose    onabotulinumtoxina injection 300 Units  300 Units Intramuscular 1 time in Clinic/HOD Monty Pardo MD         Medications Prior to Admission   Medication Sig Dispense Refill Last Dose    albuterol (PROVENTIL) 2.5 mg /3 mL (0.083 %) nebulizer solution Take 3 mLs (2.5 mg total) by nebulization every 6 (six) hours as needed for Wheezing. Rescue 25 each 3 Past Week at Unknown time    albuterol (VENTOLIN HFA) 90 mcg/actuation inhaler Inhale 2 puffs into the lungs every 6 (six) hours as needed for Wheezing. Rescue 18 g 0 Past Week at Unknown time    aspirin (ECOTRIN) 81 MG EC tablet Take 81 mg by mouth every morning.    7/5/2019 at Unknown time    atorvastatin (LIPITOR) 40 MG tablet TAKE 1 TABLET ONE TIME DAILY FOR CHOLESTEROL 90 tablet 2 7/4/2019 at Unknown time    buPROPion (WELLBUTRIN XL) 300 MG 24 hr tablet Take 1 tablet (300 mg total) by mouth once daily. 30 tablet 6 7/4/2019 at Unknown time    carvedilol (COREG) 25 MG tablet Take 1 tablet (25 mg total) by mouth 2 (two) times daily. 180 tablet 2 7/4/2019 at Unknown time    DULCOLAX, BISACODYL, ORAL Take by mouth as needed (constipation).   Past Month at Unknown time    famotidine (PEPCID) 20 MG tablet Take 1 tablet (20 mg total) by mouth once daily. 90 tablet 2 7/4/2019 at Unknown time    folic acid (FOLVITE) 1 MG tablet Take 1 tablet (1 mg total) by mouth once daily. 90 tablet 2 7/5/2019 at Unknown time    furosemide (LASIX) 40 MG tablet Take 1 tablet (40 mg total) by mouth once daily. (Patient taking differently: Take 40 mg by mouth every morning. ) 90 tablet 2 7/5/2019 at Unknown time    guaiFENesin (MUCINEX) 1,200 mg Ta12 1 tab every 12 hours as needed for congestion 24 tablet 0 Past Month at Unknown time    insulin glargine (LANTUS U-100 INSULIN) 100 unit/mL injection 10 units daily in the PM 10 mL 6 7/4/2019 at Unknown time     "levETIRAcetam (KEPPRA) 500 MG Tab Take 1 tablet (500 mg total) by mouth every 8 (eight) hours. 270 tablet 2 7/5/2019 at Unknown time    polyethylene glycol 3350 (MIRALAX ORAL) Take by mouth every evening.    7/4/2019 at Unknown time    sevelamer carbonate (RENVELA) 800 mg Tab Take 800 mg by mouth 3 (three) times daily with meals.   7/4/2019 at Unknown time    sodium bicarbonate 650 MG tablet Take 2 tablets (1,300 mg total) by mouth 2 (two) times daily. 360 tablet 2 7/4/2019 at Unknown time    traMADol (ULTRAM) 50 mg tablet 1 tab Q6-8 hours as needed for joint pain 30 tablet 0 Past Week at Unknown time    acetaminophen (TYLENOL) 325 MG tablet Take 2 tablets (650 mg total) by mouth every 6 (six) hours as needed for Pain.  0 More than a month at Unknown time    ALPRAZolam (XANAX) 0.5 MG tablet 1 tab Every 8 hours as needed for anxiety 30 tablet 0 More than a month at Unknown time    amoxicillin-clavulanate 500-125mg (AUGMENTIN) 500-125 mg Tab Take 1 tablet (500 mg total) by mouth once daily. 10 tablet 0     BD INSULIN PEN NEEDLE UF SHORT 31 gauge x 5/16" Ndle USE TO INJECT NOVOLOG FLEXPEN BEFORE MEALS 150 each 11 Taking    blood sugar diagnostic (ACCU-CHEK SMARTVIEW TEST STRIP) Strp 1 strip by Misc.(Non-Drug; Combo Route) route 2 (two) times daily. 300 each 3 6/19/2019 at Unknown time    ciprofloxacin HCl (CILOXAN) 0.3 % ophthalmic solution Place 2 drops into both eyes every 2 (two) hours. 2 drops/affected eye 3-4x/day x 3-5 days 5 mL 0 6/1/2019    dantrolene (DANTRIUM) 50 MG Cap Take 1 capsule (50 mg total) by mouth 3 (three) times daily. 270 capsule 3 More than a month at Unknown time    ergocalciferol (ERGOCALCIFEROL) 50,000 unit Cap 1 capsule (50,000 Units total) by Per G Tube route every 7 days. (Patient taking differently: Take 50,000 Units by mouth every 7 days. Takes on Thurs) 12 capsule 2 7/4/2019    ferrous sulfate 220 mg (44 mg iron)/5 mL solution 10ml daily for Iron/Give via PEG (Patient taking " differently: Take 220 mg by mouth every morning. ) 300 mL 6 Taking    flash glucose scanning reader (FREESTYLE JOSÉ LUIS 14 DAY READER) Misc Use as directed. Scan 4 times a day. 1 each 0 Taking    flash glucose sensor (FREESTYLE JOSÉ LUIS 14 DAY SENSOR) Kit Change every 14 days. 2 kit 11 Taking    lidocaine-prilocaine (EMLA) cream Apply topically as needed. 30 g 1 More than a month at Unknown time    pregabalin (LYRICA) 25 MG capsule 1 cap in AM and 2 caps at Bedtime for muscle spasm 90 capsule 4 6/20/2019 at 0830       Review of patient's allergies indicates:   Allergen Reactions    Lisinopril Other (See Comments)     Angioedema      Vicodin [hydrocodone-acetaminophen] Rash     No problem with acetaminophen        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    Cortical cataract of both eyes 7/26/2016    Decubitus ulcer of buttock, stage 2     Degeneration of lumbar or lumbosacral intervertebral disc 3/5/2013    S/p MRI L-spine 5/2009     Depression     Encounter for blood transfusion     ESRD on hemodialysis     Started August 2018    General anesthetics causing adverse effect in therapeutic use     Hemorrhagic cerebrovascular accident (CVA)     8/2016 s/p Hemicraniot[place] [facility] with Left hemiparesis    Hemorrhagic cerebrovascular accident (CVA) 1/3/2018    History of stroke 6/28/2017    s/p R-MCA stroke with R-putaminal hemorrhagic transformation in 8/2016 and 11/2016 (s/p hemicraniotomy at [facility]) with residual L hemiparesis, on AED s/p CVA      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    Hyperlipidemia     Hypertensive retinopathy of both eyes 7/26/2016    Impingement syndrome of right shoulder  7/2/2014    Left ventricular diastolic dysfunction with preserved systolic function 12/15/2015    Mixed anxiety and depressive disorder 3/5/2013    Obesity     THERESA (obstructive sleep apnea) 3/5/2013    No Home CPAP 2ndary to cost     Partial symptomatic epilepsy with complex partial seizures, not intractable, without status epilepticus     PEG (percutaneous endoscopic gastrostomy) status 6/28/2017    Renovascular hypertension 3/5/2013    Will resume home medications: amlodipine, labetalol, and hydralazine Will hold Lisinopril in setting of DARLING.   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 without macular edema, without long-term current use of insulin 8/2/2017    Type 2 diabetes mellitus with diabetic polyneuropathy, without long-term current use of insulin 10/22/2015    Type 2 diabetes mellitus with stage 3 chronic kidney disease, without long-term current use of insulin 10/22/2015    Vertebral artery stenosis 3/5/2013    S/p Stenting per Dr Burnett      Past Surgical History:   Procedure Laterality Date    ARTHROSCOPY-SHOULDER WITH SUBACROMIAL DECOMPRESSION Right 7/9/2014    Performed by Kendall Pantoja [facility] at [facility] OR    AV GRAFT CREATION Right 10/18/2018    Performed by Meir Valencia [facility] at [facility] OR 2ND FLR    Biceps Tenotomy Right 7/9/2014    Performed by Kendall [facility]antoja [facility] at [facility] OR    BREAST SURGERY      breast reduction    COLONOSCOPY N/A 8/11/2016    Performed by Jerry Vilchis MD at [facility] ENDO (4TH FLR)    DEBRIDEMENT-SHOULDER-ARTHROSCOPIC Labral Cuff Right 7/9/2014    Performed by Kendall [facility]antoja [facility] at [facility] OR    DECLOT-GRAFT Right 6/20/2019     Performed by Cabrera Irwin [facility] at [facility] CATH LAB    ESOPHAGOGASTRODUODENOSCOPY (EGD) N/A 12/6/2017    Performed by Dallas Lock Jr., [facility] at [facility] ENDO    IVONKUGN-RZHOYDFM-NZSUMC END Right 7/9/2014    [facility] at [facility] OR    Fistulogram Right 2/11/2019    Performed by [facility]ir Valencia [facility] at [facility] CATH LAB    HYSTERECTOMY      ROTATOR CUFF REPAIR Right July 9, 2014    right side    Skull surgery      Aneurysm    stent placed      in vertebral artery    TOTAL REDUCTION MAMMOPLASTY      TUBAL LIGATION       Family History     Problem Relation (Age of Onset)    Bell's palsy Sister    Blindness Maternal Grandmother    Breast cancer Mother    Cancer Mother (55)    Diabetes Mother, Son,     Heart attack Mother    Heart disease Mother    Hypertension Mother, Sister    Lupus Sister    No Known Problems Daughter    Sleep apnea Sister    Stroke Sister, Maternal Aunt        Tobacco Use    Smoking status: Never Smoker    Smokeless tobacco: Never Used   Substance and Sexual Activity    Alcohol use: Not Currently     Comment: occasional wine cooler     Drug use: No    Sexual activity: Yes     Partners: Male     Review of Systems   Constitutional: Negative for activity change, appetite change, chills, diaphoresis, fatigue, fever and unexpected weight change.   HENT: Negative for congestion, drooling, ear pain, rhinorrhea, sore throat, trouble swallowing and voice change.    Eyes: Negative for photophobia, pain, discharge, redness and visual disturbance.   Respiratory: Positive for wheezing. Negative for cough, chest tightness and shortness of breath.    Cardiovascular: Negative for chest pain, palpitations and leg swelling.   Gastrointestinal: Negative for abdominal distention, abdominal pain, blood in stool, constipation, diarrhea, nausea and vomiting.   Endocrine: Negative for cold intolerance, heat intolerance, polydipsia, polyphagia and polyuria.   Genitourinary: Positive for  decreased urine volume (still makes urine). Negative for difficulty urinating, dysuria, flank pain and frequency.   Musculoskeletal: Negative for arthralgias, back pain, myalgias and neck pain.   Skin: Negative for color change, pallor, rash and wound.   Neurological: Negative for dizziness, tremors, syncope, facial asymmetry, speech difficulty, weakness, light-headedness, numbness and headaches.   Hematological: Negative for adenopathy. Does not bruise/bleed easily.   Psychiatric/Behavioral: Negative.  Negative for confusion, dysphoric mood and sleep disturbance. The patient is not nervous/anxious.      Objective:     Vital Signs (Most Recent):  Temp: 97.8 °F (36.6 °C) (07/06/19 1204)  Pulse: 60 (07/06/19 1204)  Resp: 14 (07/06/19 1204)  BP: 127/61 (07/06/19 1204)  SpO2: 99 % (07/06/19 1204) Vital Signs (24h Range):  Temp:  [96.6 °F (35.9 °C)-97.8 °F (36.6 °C)] 97.8 °F (36.6 °C)  Pulse:  [58-69] 60  Resp:  [11-18] 14  SpO2:  [98 %-100 %] 99 %  BP: (100-130)/(53-61) 127/61     Weight: 99.8 kg (220 lb)  Body mass index is 38.97 kg/m².    Physical Exam   Constitutional: She is oriented to person, place, and time. She appears well-developed and well-nourished.   HENT:   Head: Normocephalic and atraumatic.   Eyes: Pupils are equal, round, and reactive to light. EOM are normal.   Neck: Normal range of motion. Neck supple. No tracheal deviation present. No thyromegaly present.   Cardiovascular: Normal rate, regular rhythm and intact distal pulses.   No murmur heard.  Pulmonary/Chest: Effort normal. She has no wheezes.   Crackles at bases b/l R>L   Abdominal: Soft. Bowel sounds are normal. There is tenderness in the right lower quadrant. There is no rebound, no guarding and no CVA tenderness.   Musculoskeletal: Normal range of motion. She exhibits no edema or tenderness.   Lymphadenopathy:     She has no cervical adenopathy.   Neurological: She is alert and oriented to person, place, and time. She has normal reflexes. No  cranial nerve deficit.   Skin: Skin is warm and dry.   CAROLE AVG with weak thrill, high pitched bruit.   Psychiatric: She has a normal mood and affect. Her behavior is normal.   Nursing note and vitals reviewed.      Significant Labs:  CBC:   Recent Labs   Lab 07/06/19  0347   WBC 6.97   RBC 2.95*   HGB 9.4*   HCT 31.3*      *   MCH 31.9*   MCHC 30.0*     CMP:   Recent Labs   Lab 07/05/19  0845 07/06/19 0347   * 205*   CALCIUM 10.0 9.4   ALBUMIN 3.7 3.1*   PROT 8.1  --     137   K 5.6* 5.3*   CO2 29 31*   CL 94* 95   BUN 57* 58*   CREATININE 7.3* 7.2*   ALKPHOS 92  --    ALT 9*  --    AST 12  --    BILITOT 0.5  --        Significant Diagnostics:  I have reviewed all pertinent imaging results/findings within the past 24 hours.

## 2019-07-06 NOTE — HPI
Ms. Perez is a 57yo F with a history of HTN, HLD, L hemiplegia after large stroke in 2016, DM II, stage V CKD since August 2018 on HD MWF via RUE AVG. She has a residual impairment on her left upper extremity and left lower extremity from her stroke and is currently wheel chair bound. She has complete function of her right arm currently. She is right handed. No history of trauma to right arm.      She underwent declot procedure on 6/20/19 where she was noted to have successful declot of thrombosed AV graft but persistent right brachiocephalic vein occlusion without recanalization attempt given prolonged bradycardia following thrombectomy. She has been undergoing dialysis per her schedule since the procedure but has not since followed up with us in clinic. She presented to the hospital overnight given abdominal pain and was admitted to the medicine team. She was noted to have low flows, high arterial pressures and pain with dialysis this morning and as such vascular surgery was consulted for evaluation. She did have a arterial ultrasound which demonstrated patency of the graft.

## 2019-07-06 NOTE — PROGRESS NOTES
Discontinued patient on HD treatment. High arterial pressure alarms persisted, even with BFR at 200cc/min. Patient c/o pain on access site.    Had only 35 -40 mins of treament on machine.    Blood rinsed back, needles pulled out.    + Faint bruit and thrill, high pitched bruit noted    Nephro MD informed

## 2019-07-06 NOTE — ASSESSMENT & PLAN NOTE
58 yro F with PMH of ESRD on iHD MWF ([facility]), HTN, DM2 (A1C 7.5 ), L hemiplegia after hemorraghic CVA 2016, HFpEF, THERESA, on 2 L PRN home O2 who presents to ED 7/5/19 with the complaint of RLQ abdominal pain present for 1 day that feels similar to prior episode of biliary colic exerpeinced ~1 mo ago. Nephrology has been consulted for management of HD as an in patient.     Out pt iHD careplan, per pt report:  iHD MWF  HD center: [facility] MD: [facility]  Duration of HD: 3.5 hours  UF: 2L  UO: oliguric  EDW: pt does not know    Plan/Recommendations:  -recommend vascular surgery consultation for evaluation  -hold further RRT today  -recheck K this afternoon.  Medically management if elevated  -will re-evaluate tomorrow for further needs.

## 2019-07-06 NOTE — PROGRESS NOTES
07/06/19 1735   Critical Value Communication   Name of Notified Physician/Designee Dr Youssef   Date Result Received 07/06/19   Time Result Received 1732   Resulting Department of Critical Value Lab   Critical Test #1 Potassium 7.5   Date Notified 07/06/19   Time Notified 1739   Read Back Verification Yes

## 2019-07-06 NOTE — ASSESSMENT & PLAN NOTE
59yo F with a history of HTN, HLD, L hemiplegia after large stroke in 2016, DM II, stage V CKD since August 2018 on HD MWF via RUE AVG.    - Patient seen and examined, labs and imaging reviewed, discussed with staff  - Recently s/p declot on 6/20 but with continued difficulty at dialysis, as such will plan to perform repeat fistulagram on Monday in cath lab vs OR based on availability  - Discussed with patient, she understands the risks and benefits of the procedure and is amenable, consents signed  - NPO at midnight 7/8  - Remainder of care per primary  - If continued difficulty with dialysis and urgent need from the medicine standpoint, may need central line placement for HD while inpatient   - Please call for questions or concerns

## 2019-07-06 NOTE — PROGRESS NOTES
[facility]  Nephrology  Progress Note    Patient Name: Lucy Perez  MRN: [id number]  Admission Date: 7/5/2019  Hospital Length of Stay: 0 days  Attending Provider: Devante Vasquez MD   Primary Care Physician: Beverly Muniz MD  Principal Problem:Acute respiratory failure with hypoxia    Subjective:     Interval History:   HD attempted early this morning, reported by nursing staff that there was difficulty with cannulation with numerous clots removed for needles.  High arterial pressures resulting in low BFR, and patient experiencing pain requiring discontinuation of treatment after 40 minutes.   at the bedside reports her OP unit has difficulty with cannulation as well, and only a few technicians are able to cannulate it without problems.  On exam, high-pitched bruit present to proximal aspect of the graft.     Review of patient's allergies indicates:   Allergen Reactions    Lisinopril Other (See Comments)     Angioedema      Vicodin [hydrocodone-acetaminophen] Rash     No problem with acetaminophen      Current Facility-Administered Medications   Medication Frequency    0.9%  NaCl infusion Once    acetaminophen tablet 650 mg Q4H PRN    albuterol inhaler 2 puff Q6H PRN    ALPRAZolam tablet 0.5 mg Daily PRN    aspirin EC tablet 81 mg QAM    atorvastatin tablet 40 mg QHS    buPROPion TB24 tablet 300 mg Daily    dextrose 10% (D10W) Bolus PRN    dextrose 10% (D10W) Bolus PRN    famotidine tablet 20 mg Daily    folic acid tablet 1 mg Daily    furosemide tablet 40 mg Daily    glucagon (human recombinant) injection 1 mg PRN    glucose chewable tablet 16 g PRN    glucose chewable tablet 24 g PRN    guaiFENesin 12 hr tablet 1,200 mg BID PRN    insulin aspart U-100 pen 0-5 Units QID (AC + HS) PRN    insulin detemir U-100 pen 8 Units QHS    levETIRAcetam tablet 500 mg Q8H    ondansetron injection 4 mg Q12H PRN    polyethylene glycol packet 17 g Q12H PRN     polyethylene glycol packet 17 g QHS    [START ON 7/8/2019] pregabalin capsule 25 mg Every Mon, Wed, Fri    ramelteon tablet 8 mg Nightly PRN    sevelamer carbonate tablet 800 mg TID WM    sodium bicarbonate tablet 1,300 mg BID    sodium chloride 0.9% flush 10 mL PRN    traMADol tablet 50 mg Q8H PRN       Objective:     Vital Signs (Most Recent):  Temp: 97.5 °F (36.4 °C) (07/06/19 0410)  Pulse: 68 (07/06/19 1124)  Resp: 16 (07/06/19 0747)  BP: (!) 106/54 (07/06/19 0747)  SpO2: 98 % (07/06/19 0747)  O2 Device (Oxygen Therapy): nasal cannula (07/05/19 1617) Vital Signs (24h Range):  Temp:  [96.6 °F (35.9 °C)-97.6 °F (36.4 °C)] 97.5 °F (36.4 °C)  Pulse:  [58-69] 68  Resp:  [11-18] 16  SpO2:  [98 %-100 %] 98 %  BP: (100-130)/(53-61) 106/54     Weight: 99.8 kg (220 lb) (07/05/19 1655)  Body mass index is 38.97 kg/m².  Body surface area is 2.11 meters squared.    No intake/output data recorded.    Physical Exam   Constitutional: She is oriented to person, place, and time. She appears well-developed and well-nourished.   HENT:   Head: Normocephalic and atraumatic.   Eyes: Pupils are equal, round, and reactive to light. EOM are normal.   Neck: Normal range of motion. Neck supple. No tracheal deviation present. No thyromegaly present.   Cardiovascular: Normal rate, regular rhythm and intact distal pulses.   No murmur heard.  Pulmonary/Chest: Effort normal. She has no wheezes.   Crackles at bases b/l R>L   Abdominal: Soft. Bowel sounds are normal. There is tenderness in the right lower quadrant. There is no rebound, no guarding and no CVA tenderness.   Musculoskeletal: Normal range of motion. She exhibits no edema or tenderness.   Lymphadenopathy:     She has no cervical adenopathy.   Neurological: She is alert and oriented to person, place, and time. She has normal reflexes. No cranial nerve deficit.   Skin: Skin is warm and dry.   CAROLE AVG with high pitched bruit.   Psychiatric: She has a normal mood and affect. Her  behavior is normal.   Nursing note and vitals reviewed.      Significant Labs:  CBC:   Recent Labs   Lab 07/06/19  0347   WBC 6.97   RBC 2.95*   HGB 9.4*   HCT 31.3*      *   MCH 31.9*   MCHC 30.0*     CMP:   Recent Labs   Lab 07/05/19  0845 07/06/19  0347   * 205*   CALCIUM 10.0 9.4   ALBUMIN 3.7 3.1*   PROT 8.1  --     137   K 5.6* 5.3*   CO2 29 31*   CL 94* 95   BUN 57* 58*   CREATININE 7.3* 7.2*   ALKPHOS 92  --    ALT 9*  --    AST 12  --    BILITOT 0.5  --             Assessment/Plan:     ESRD (end stage renal disease) on dialysis  58 yro F with PMH of ESRD on iHD MWF ([facility]), HTN, DM2 (A1C 7.5 ), L hemiplegia after hemorraghic CVA 2016, HFpEF, THERESA, on 2 L PRN home O2 who presents to ED 7/5/19 with the complaint of RLQ abdominal pain present for 1 day that feels similar to prior episode of biliary colic exerpeinced ~1 mo ago. Nephrology has been consulted for management of HD as an in patient.     Out pt iHD careplan, per pt report:  iHD MWF  HD center: [facility] MD: [facility]anna A[facility]  Duration of HD: 3.5 hours  UF: 2L  UO: oliguric  EDW: pt does not know    Plan/Recommendations:  -recommend vascular surgery consultation for evaluation  -hold further RRT today  -recheck K this afternoon.  Medically management if elevated  -will re-evaluate tomorrow for further needs.          Kevin Mcbride NP  Nephrology  [facility]

## 2019-07-07 PROBLEM — K59.01 SLOW TRANSIT CONSTIPATION: Status: ACTIVE | Noted: 2017-06-16

## 2019-07-07 LAB
ALBUMIN SERPL BCP-MCNC: 3.1 G/DL (ref 3.5–5.2)
ALBUMIN SERPL BCP-MCNC: 3.3 G/DL (ref 3.5–5.2)
ALP SERPL-CCNC: 94 U/L (ref 55–135)
ALT SERPL W/O P-5'-P-CCNC: 5 U/L (ref 10–44)
ANION GAP SERPL CALC-SCNC: 11 MMOL/L (ref 8–16)
ANION GAP SERPL CALC-SCNC: 13 MMOL/L (ref 8–16)
ANION GAP SERPL CALC-SCNC: 14 MMOL/L (ref 8–16)
AST SERPL-CCNC: 11 U/L (ref 10–40)
BASOPHILS # BLD AUTO: 0.07 K/UL (ref 0–0.2)
BASOPHILS NFR BLD: 0.8 % (ref 0–1.9)
BILIRUB DIRECT SERPL-MCNC: 0.2 MG/DL (ref 0.1–0.3)
BILIRUB SERPL-MCNC: 0.5 MG/DL (ref 0.1–1)
BUN SERPL-MCNC: 29 MG/DL (ref 6–20)
BUN SERPL-MCNC: 3 MG/DL (ref 6–20)
BUN SERPL-MCNC: 44 MG/DL (ref 6–20)
BUN SERPL-MCNC: 44 MG/DL (ref 6–20)
BUN SERPL-MCNC: 49 MG/DL (ref 6–20)
BUN SERPL-MCNC: 52 MG/DL (ref 6–20)
BUN SERPL-MCNC: 55 MG/DL (ref 6–20)
BUN SERPL-MCNC: 67 MG/DL (ref 6–20)
CALCIUM SERPL-MCNC: 9 MG/DL (ref 8.7–10.5)
CALCIUM SERPL-MCNC: 9 MG/DL (ref 8.7–10.5)
CALCIUM SERPL-MCNC: 9.1 MG/DL (ref 8.7–10.5)
CALCIUM SERPL-MCNC: 9.1 MG/DL (ref 8.7–10.5)
CALCIUM SERPL-MCNC: 9.2 MG/DL (ref 8.7–10.5)
CALCIUM SERPL-MCNC: 9.2 MG/DL (ref 8.7–10.5)
CALCIUM SERPL-MCNC: 9.6 MG/DL (ref 8.7–10.5)
CALCIUM SERPL-MCNC: 9.8 MG/DL (ref 8.7–10.5)
CHLORIDE SERPL-SCNC: 100 MMOL/L (ref 95–110)
CHLORIDE SERPL-SCNC: 103 MMOL/L (ref 95–110)
CHLORIDE SERPL-SCNC: 94 MMOL/L (ref 95–110)
CHLORIDE SERPL-SCNC: 95 MMOL/L (ref 95–110)
CHLORIDE SERPL-SCNC: 95 MMOL/L (ref 95–110)
CHLORIDE SERPL-SCNC: 97 MMOL/L (ref 95–110)
CO2 SERPL-SCNC: 24 MMOL/L (ref 23–29)
CO2 SERPL-SCNC: 25 MMOL/L (ref 23–29)
CO2 SERPL-SCNC: 26 MMOL/L (ref 23–29)
CO2 SERPL-SCNC: 28 MMOL/L (ref 23–29)
CO2 SERPL-SCNC: 28 MMOL/L (ref 23–29)
CO2 SERPL-SCNC: 29 MMOL/L (ref 23–29)
CREAT SERPL-MCNC: 1.1 MG/DL (ref 0.5–1.4)
CREAT SERPL-MCNC: 4.7 MG/DL (ref 0.5–1.4)
CREAT SERPL-MCNC: 6.3 MG/DL (ref 0.5–1.4)
CREAT SERPL-MCNC: 6.3 MG/DL (ref 0.5–1.4)
CREAT SERPL-MCNC: 6.8 MG/DL (ref 0.5–1.4)
CREAT SERPL-MCNC: 6.9 MG/DL (ref 0.5–1.4)
CREAT SERPL-MCNC: 7.4 MG/DL (ref 0.5–1.4)
CREAT SERPL-MCNC: 8.1 MG/DL (ref 0.5–1.4)
DIFFERENTIAL METHOD: ABNORMAL
EOSINOPHIL # BLD AUTO: 0.2 K/UL (ref 0–0.5)
EOSINOPHIL NFR BLD: 2 % (ref 0–8)
ERYTHROCYTE [DISTWIDTH] IN BLOOD BY AUTOMATED COUNT: 12.7 % (ref 11.5–14.5)
EST. GFR  (AFRICAN AMERICAN): 10.9 ML/MIN/1.73 M^2
EST. GFR  (AFRICAN AMERICAN): 5.6 ML/MIN/1.73 M^2
EST. GFR  (AFRICAN AMERICAN): 6.3 ML/MIN/1.73 M^2
EST. GFR  (AFRICAN AMERICAN): 6.8 ML/MIN/1.73 M^2
EST. GFR  (AFRICAN AMERICAN): 7 ML/MIN/1.73 M^2
EST. GFR  (AFRICAN AMERICAN): 7.6 ML/MIN/1.73 M^2
EST. GFR  (AFRICAN AMERICAN): 7.6 ML/MIN/1.73 M^2
EST. GFR  (AFRICAN AMERICAN): >60 ML/MIN/1.73 M^2
EST. GFR  (NON AFRICAN AMERICAN): 4.9 ML/MIN/1.73 M^2
EST. GFR  (NON AFRICAN AMERICAN): 5.5 ML/MIN/1.73 M^2
EST. GFR  (NON AFRICAN AMERICAN): 5.9 ML/MIN/1.73 M^2
EST. GFR  (NON AFRICAN AMERICAN): 54.7 ML/MIN/1.73 M^2
EST. GFR  (NON AFRICAN AMERICAN): 6 ML/MIN/1.73 M^2
EST. GFR  (NON AFRICAN AMERICAN): 6.6 ML/MIN/1.73 M^2
EST. GFR  (NON AFRICAN AMERICAN): 6.6 ML/MIN/1.73 M^2
EST. GFR  (NON AFRICAN AMERICAN): 9.5 ML/MIN/1.73 M^2
GLUCOSE SERPL-MCNC: 112 MG/DL (ref 70–110)
GLUCOSE SERPL-MCNC: 175 MG/DL (ref 70–110)
GLUCOSE SERPL-MCNC: 192 MG/DL (ref 70–110)
GLUCOSE SERPL-MCNC: 192 MG/DL (ref 70–110)
GLUCOSE SERPL-MCNC: 200 MG/DL (ref 70–110)
GLUCOSE SERPL-MCNC: 230 MG/DL (ref 70–110)
GLUCOSE SERPL-MCNC: 234 MG/DL (ref 70–110)
GLUCOSE SERPL-MCNC: 239 MG/DL (ref 70–110)
HCT VFR BLD AUTO: 30.7 % (ref 37–48.5)
HGB BLD-MCNC: 9.5 G/DL (ref 12–16)
IMM GRANULOCYTES # BLD AUTO: 0.03 K/UL (ref 0–0.04)
IMM GRANULOCYTES NFR BLD AUTO: 0.4 % (ref 0–0.5)
LYMPHOCYTES # BLD AUTO: 1.3 K/UL (ref 1–4.8)
LYMPHOCYTES NFR BLD: 15.2 % (ref 18–48)
MAGNESIUM SERPL-MCNC: 2.3 MG/DL (ref 1.6–2.6)
MCH RBC QN AUTO: 32.6 PG (ref 27–31)
MCHC RBC AUTO-ENTMCNC: 30.9 G/DL (ref 32–36)
MCV RBC AUTO: 106 FL (ref 82–98)
MONOCYTES # BLD AUTO: 0.5 K/UL (ref 0.3–1)
MONOCYTES NFR BLD: 5.5 % (ref 4–15)
NEUTROPHILS # BLD AUTO: 6.4 K/UL (ref 1.8–7.7)
NEUTROPHILS NFR BLD: 76.1 % (ref 38–73)
NRBC BLD-RTO: 0 /100 WBC
PHOSPHATE SERPL-MCNC: 4.9 MG/DL (ref 2.7–4.5)
PLATELET # BLD AUTO: 213 K/UL (ref 150–350)
PMV BLD AUTO: 10.2 FL (ref 9.2–12.9)
POCT GLUCOSE: 112 MG/DL (ref 70–110)
POCT GLUCOSE: 195 MG/DL (ref 70–110)
POCT GLUCOSE: 224 MG/DL (ref 70–110)
POCT GLUCOSE: 245 MG/DL (ref 70–110)
POTASSIUM SERPL-SCNC: 2.3 MMOL/L (ref 3.5–5.1)
POTASSIUM SERPL-SCNC: 4.5 MMOL/L (ref 3.5–5.1)
POTASSIUM SERPL-SCNC: 4.8 MMOL/L (ref 3.5–5.1)
POTASSIUM SERPL-SCNC: 5 MMOL/L (ref 3.5–5.1)
POTASSIUM SERPL-SCNC: 5.5 MMOL/L (ref 3.5–5.1)
POTASSIUM SERPL-SCNC: 5.8 MMOL/L (ref 3.5–5.1)
POTASSIUM SERPL-SCNC: 5.9 MMOL/L (ref 3.5–5.1)
POTASSIUM SERPL-SCNC: 5.9 MMOL/L (ref 3.5–5.1)
PROT SERPL-MCNC: 7.5 G/DL (ref 6–8.4)
RBC # BLD AUTO: 2.91 M/UL (ref 4–5.4)
SODIUM SERPL-SCNC: 134 MMOL/L (ref 136–145)
SODIUM SERPL-SCNC: 134 MMOL/L (ref 136–145)
SODIUM SERPL-SCNC: 135 MMOL/L (ref 136–145)
SODIUM SERPL-SCNC: 136 MMOL/L (ref 136–145)
SODIUM SERPL-SCNC: 138 MMOL/L (ref 136–145)
SODIUM SERPL-SCNC: 140 MMOL/L (ref 136–145)
WBC # BLD AUTO: 8.35 K/UL (ref 3.9–12.7)

## 2019-07-07 PROCEDURE — 80069 RENAL FUNCTION PANEL: CPT | Mod: HCNC,NTX

## 2019-07-07 PROCEDURE — 93010 EKG 12-LEAD: ICD-10-PCS | Mod: HCNC,NTX,, | Performed by: INTERNAL MEDICINE

## 2019-07-07 PROCEDURE — 80076 HEPATIC FUNCTION PANEL: CPT | Mod: HCNC,NTX

## 2019-07-07 PROCEDURE — 93005 ELECTROCARDIOGRAM TRACING: CPT | Mod: HCNC,NTX

## 2019-07-07 PROCEDURE — 90935 HEMODIALYSIS ONE EVALUATION: CPT | Mod: HCNC,NTX

## 2019-07-07 PROCEDURE — 63600175 PHARM REV CODE 636 W HCPCS: Mod: HCNC,NTX | Performed by: PHYSICIAN ASSISTANT

## 2019-07-07 PROCEDURE — 93010 ELECTROCARDIOGRAM REPORT: CPT | Mod: HCNC,NTX,, | Performed by: INTERNAL MEDICINE

## 2019-07-07 PROCEDURE — 80100014 HC HEMODIALYSIS 1:1: Mod: HCNC,NTX

## 2019-07-07 PROCEDURE — 36593 DECLOT VASCULAR DEVICE: CPT | Mod: HCNC,NTX

## 2019-07-07 PROCEDURE — 85025 COMPLETE CBC W/AUTO DIFF WBC: CPT | Mod: HCNC,NTX

## 2019-07-07 PROCEDURE — 93041 RHYTHM ECG TRACING: CPT | Mod: HCNC,NTX

## 2019-07-07 PROCEDURE — 25000003 PHARM REV CODE 250: Mod: HCNC,NTX | Performed by: HOSPITALIST

## 2019-07-07 PROCEDURE — 25000003 PHARM REV CODE 250: Mod: HCNC,NTX | Performed by: PHYSICIAN ASSISTANT

## 2019-07-07 PROCEDURE — 83735 ASSAY OF MAGNESIUM: CPT | Mod: HCNC,NTX

## 2019-07-07 PROCEDURE — 20600001 HC STEP DOWN PRIVATE ROOM: Mod: HCNC,NTX

## 2019-07-07 PROCEDURE — 99232 SBSQ HOSP IP/OBS MODERATE 35: CPT | Mod: HCNC,NTX,, | Performed by: HOSPITALIST

## 2019-07-07 PROCEDURE — 99232 PR SUBSEQUENT HOSPITAL CARE,LEVL II: ICD-10-PCS | Mod: HCNC,NTX,, | Performed by: HOSPITALIST

## 2019-07-07 PROCEDURE — 80048 BASIC METABOLIC PNL TOTAL CA: CPT | Mod: 91,HCNC,NTX

## 2019-07-07 PROCEDURE — 63600175 PHARM REV CODE 636 W HCPCS: Mod: HCNC,JG,NTX | Performed by: GENERAL PRACTICE

## 2019-07-07 PROCEDURE — 36415 COLL VENOUS BLD VENIPUNCTURE: CPT | Mod: HCNC,NTX

## 2019-07-07 RX ORDER — SENNOSIDES 8.6 MG/1
8.6 TABLET ORAL 2 TIMES DAILY
Status: DISCONTINUED | OUTPATIENT
Start: 2019-07-07 | End: 2019-07-10

## 2019-07-07 RX ORDER — BISACODYL 10 MG
10 SUPPOSITORY, RECTAL RECTAL DAILY PRN
Status: DISCONTINUED | OUTPATIENT
Start: 2019-07-07 | End: 2019-07-11 | Stop reason: HOSPADM

## 2019-07-07 RX ORDER — LACTULOSE 10 G/15ML
20 SOLUTION ORAL ONCE
Status: COMPLETED | OUTPATIENT
Start: 2019-07-07 | End: 2019-07-07

## 2019-07-07 RX ORDER — POLYETHYLENE GLYCOL 3350 17 G/17G
17 POWDER, FOR SOLUTION ORAL 2 TIMES DAILY
Status: DISCONTINUED | OUTPATIENT
Start: 2019-07-07 | End: 2019-07-11 | Stop reason: HOSPADM

## 2019-07-07 RX ADMIN — SENNOSIDES 8.6 MG: 8.6 TABLET, FILM COATED ORAL at 10:07

## 2019-07-07 RX ADMIN — SEVELAMER CARBONATE 800 MG: 800 TABLET, FILM COATED ORAL at 04:07

## 2019-07-07 RX ADMIN — ASPIRIN 81 MG: 81 TABLET, COATED ORAL at 05:07

## 2019-07-07 RX ADMIN — ONDANSETRON 4 MG: 2 INJECTION INTRAMUSCULAR; INTRAVENOUS at 05:07

## 2019-07-07 RX ADMIN — SODIUM BICARBONATE 650 MG TABLET 1300 MG: at 08:07

## 2019-07-07 RX ADMIN — SEVELAMER CARBONATE 800 MG: 800 TABLET, FILM COATED ORAL at 08:07

## 2019-07-07 RX ADMIN — ATORVASTATIN CALCIUM 40 MG: 20 TABLET, FILM COATED ORAL at 10:07

## 2019-07-07 RX ADMIN — LEVETIRACETAM 500 MG: 500 TABLET, FILM COATED ORAL at 10:07

## 2019-07-07 RX ADMIN — FOLIC ACID 1 MG: 1 TABLET ORAL at 08:07

## 2019-07-07 RX ADMIN — FUROSEMIDE 40 MG: 40 TABLET ORAL at 08:07

## 2019-07-07 RX ADMIN — POLYETHYLENE GLYCOL 3350 17 G: 17 POWDER, FOR SOLUTION ORAL at 11:07

## 2019-07-07 RX ADMIN — SENNOSIDES 8.6 MG: 8.6 TABLET, FILM COATED ORAL at 11:07

## 2019-07-07 RX ADMIN — INSULIN ASPART 2 UNITS: 100 INJECTION, SOLUTION INTRAVENOUS; SUBCUTANEOUS at 04:07

## 2019-07-07 RX ADMIN — FAMOTIDINE 20 MG: 20 TABLET, FILM COATED ORAL at 08:07

## 2019-07-07 RX ADMIN — LACTULOSE 20 G: 20 SOLUTION ORAL at 11:07

## 2019-07-07 RX ADMIN — LEVETIRACETAM 500 MG: 500 TABLET, FILM COATED ORAL at 02:07

## 2019-07-07 RX ADMIN — SEVELAMER CARBONATE 800 MG: 800 TABLET, FILM COATED ORAL at 11:07

## 2019-07-07 RX ADMIN — LEVETIRACETAM 500 MG: 500 TABLET, FILM COATED ORAL at 05:07

## 2019-07-07 RX ADMIN — BUPROPION HYDROCHLORIDE 300 MG: 150 TABLET, EXTENDED RELEASE ORAL at 08:07

## 2019-07-07 RX ADMIN — INSULIN ASPART 2 UNITS: 100 INJECTION, SOLUTION INTRAVENOUS; SUBCUTANEOUS at 11:07

## 2019-07-07 RX ADMIN — SODIUM BICARBONATE 650 MG TABLET 1300 MG: at 10:07

## 2019-07-07 RX ADMIN — ALTEPLASE 4 MG: 2.2 INJECTION, POWDER, LYOPHILIZED, FOR SOLUTION INTRAVENOUS at 03:07

## 2019-07-07 NOTE — PROGRESS NOTES
R femoral 12F 25 cm trialysis inserted. R IJ examined under US and stenosed with clot visible in lumen. L IJ accessed with needle but unable to thread guidewire successfully. Site moved to groin at this time, with access obtained and catheter placed. Nephrology notified. Will obtain CXR due to L IJ attempt. VSS stable throughout.    Maryse Kwok MD  9:21 PM

## 2019-07-07 NOTE — PROGRESS NOTES
Patient is awake and alert, confused at times. + generalized edema, with o2 inhalation at 2lpm. HD started via newly inserted right femoral temporary catheter, slight positional flow. Line reversed. BFR kept at 200-250cc/min.    Used 1k bath as ordered. Serum K at 6.2.    UF net 2L, will monitor BP trends and will adjust uf accordingly.

## 2019-07-07 NOTE — ANESTHESIA PROCEDURE NOTES
Trialysis     Diagnosis: ESRD  Patient location during procedure: floor  Procedure start time: 7/6/2019 8:35 PM  Timeout: 7/6/2019 8:30 PM  Procedure end time: 7/6/2019 9:15 PM    Staffing  Authorizing Provider: Maryse Kwok MD  Performing Provider: Maryse Kwok MD    Staffing  Anesthesiologist: Francheska Rodarte MD  Resident/CRNA: Maryse Kwok MD  Performed: resident/CRNA   Anesthesiologist was present at the time of the procedure.  Preanesthetic Checklist  Completed: patient identified, site marked, surgical consent, pre-op evaluation, timeout performed, IV checked, risks and benefits discussed, monitors and equipment checked and anesthesia consent given  Indication   Indication: hemodialysis     Anesthesia   local infiltration    Central Line   Skin Prep: skin prepped with ChloraPrep, skin prep agent completely dried prior to procedure  maximum sterile barriers used during central venous catheter insertion  hand hygiene performed prior to central venous catheter insertion  Location: right, femoral.   Catheter type: triple lumen  Catheter Size: 12 Fr  Inserted Catheter Length: 25 cm  Rationale: R IJ with clot throughout lumen when examined under US; L IJ attempted with successful stick, but inability to thread guidewire.   Ultrasound: vascular probe with ultrasound  Vessel Caliber: medium, patent, compressibility normal  Needle advanced into vessel with real time Ultrasound guidance.  Guidewire confirmed in vessel.  Image recorded and saved.   Manometry: Venous cannualation confirmed by visual estimation of blood vessel pressure using manometry.  Insertion Attempts: 1   Securement:line sutured, chlorhexidine patch, sterile dressing applied and blood return through all ports    Post-Procedure   Adverse Events:none    Guidewire Guidewire removed intact. Guidewire removed intact, verified with nurse.

## 2019-07-07 NOTE — PROGRESS NOTES
Update with primary nurse. No line inserted yet as of the moment.     Present Serum K - 6.6    Awaiting hd catheter insertion + placement verification prior to dialysis.

## 2019-07-07 NOTE — PROGRESS NOTES
[facility] Hospital Medicine  Progress Note    Patient Name: Lucy Perez  MRN: [id number]  Patient Class: IP- Inpatient   Admission Date: 7/5/2019  Length of Stay: 1 days  Attending Physician: Devante Vasquez MD  Primary Care Provider: Beverly Muniz MD    [facility] Medicine Team: [facility] HOSP MED A Devante Vasquez MD    Subjective:     Principal Problem:Hyperkalemia    HPI: 57 y/o F with PMH of ESRD on HD MWF, last session Wednesday (7/3/19), T2DM on insulin, sarcoidosis, CHF (last EF 60-65%, grade 2 diastolic dysfunction), RA, 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 Patient reports pain woke her up from sleep at 4 AM this morning. She describes it a constant, sharp pain that originally radiated to her back but has since localized to just RLQ. Pain has not worsened since onset but is worse with laying down and improved with sitting up. She has had similar episode of pain in the past (a few months ago) and was told it was gallstones. She was told to avoid fatty/spicy foods, which she reports compliance with. Patient also complaining of SOB, cough, and wheezing for past 2 days beginning after last dialysis session. She is on 2L O2 at home PRN for CHF symptoms. She endorses chronic constipation with last BM 2 days ago and described as hard (typical) without noted melena or hematochezia. Her last dialysis session was 7/3/19 without any complications, dry weight reported as 101.9 kg. She still produces small amount of urine. Of note, she had recent graft declotting procedure on 6/20/19 and was admitted to the hospital the following day for hematuria. She was discharged home with urology follow-up. She denies fever/chills, N/V/D, CP, edema, diaphoresis, dizziness/lightheadedness, syncope, vision changes, dysphagia, slurred speech, focal weakness.         In the ED: O2 saturation dropped to 86%, placed on 2L NC with improvement.  Remaining vitals WNL on arrival. Afebrile without leukocytosis. Lactate 1.5.  Lipase mildly elevated at 64. CT abdomen with contrast remarkable for: several small layering gallstones without evidence of cholecystitis.Calcification in the expected course of the mid right ureter that is likely vascular calcification in the right ovarian vein. Small fat containing ventral wall hernia.Irregular opacities at the lung bases likely atelectasis. EKG with sinus rhythm, 1st degree AV block. K 5.6. BUN 57. Cr 7.3. Glucose 234. Given morphine for pain control.     Hospital Course:   Overnight admitted for concerns of hyperkalemia, she had missed HD session on FRI, prior to admission.  On Friday evening in hospital attempted HD was terminated after 30-40min due to high pressures s/p cannulation of AVG. She has a with recent 6/20 - vascular procedure for stenosis for AV graft stenosis. Vascular surgery consulted this admission and there is a plan for Monday - Fistulogram     Due to missed HD session patient with hyperkalemia, that progressed during admission, patient had urgent evening placement of trialysis with partial HD session due to line clotting off.  Hyperkalemia stabilized and will be monitored pending fistulogram    For her Abdominal pain, has a hx of cholelithiasis, labs not consistent with cholestasis, CT imaging w/o cholecystitis, biliary ductal dilatation, appendicitis, or pancreatitis.  PRN pain medications w/o much improvement in her symptoms.  CT scan does have diffuse stool through entire colon, starting laxatives concern constipation may be contributing to her symptoms.       Interval History:    Overnight IJ trialysis access not feasible and pt had placement of Right Femoral trialysis catheter, line clotted so partial use with use of fistula, received 1L UF and 2hrs of treatment     No BM since last Tuesday, pt reports this is normal for her.  Review of CT images shows diffuse stool     notes sometimes  requires enemas to have BM, patient feels she is not constipated, reviewed CT imaging with  at bedside.     Add on -hepatic panel          Review of Systems   Constitutional: Positive for fatigue.   HENT: Negative for sinus pressure and sore throat.    Eyes: Negative for visual disturbance.   Respiratory: Negative for cough, chest tightness and shortness of breath.    Cardiovascular: Negative for chest pain and leg swelling.   Gastrointestinal: Positive for abdominal pain. Negative for constipation, diarrhea, nausea and vomiting.   Genitourinary: Negative for dysuria.   Musculoskeletal: Negative for back pain.   Neurological: Negative for dizziness.   Psychiatric/Behavioral: Negative for confusion.     Objective:     Vital Signs (Most Recent):  Temp: 98.4 °F (36.9 °C) (07/07/19 0757)  Pulse: 74 (07/07/19 0757)  Resp: 14 (07/07/19 0757)  BP: 128/66 (07/07/19 0757)  SpO2: 98 % (07/07/19 0757) Vital Signs (24h Range):  Temp:  [97.8 °F (36.6 °C)-98.6 °F (37 °C)] 98.4 °F (36.9 °C)  Pulse:  [60-82] 74  Resp:  [14-24] 14  SpO2:  [98 %-100 %] 98 %  BP: ()/(47-77) 128/66     Weight: 83.8 kg (184 lb 11.9 oz)  Body mass index is 32.73 kg/m².    Intake/Output Summary (Last 24 hours) at 7/7/2019 1039  Last data filed at 7/7/2019 0215  Gross per 24 hour   Intake 800 ml   Output 2000 ml   Net -1200 ml      Physical Exam   Constitutional: No distress.   Eyes: Pupils are equal, round, and reactive to light. No scleral icterus. Right eye exhibits nystagmus. Left eye exhibits nystagmus.   Cardiovascular: Normal rate and regular rhythm.   Murmur heard.  Pulmonary/Chest: Effort normal. No stridor. She has no wheezes.   Decreased breath sounds at bases   Abdominal: Soft. Bowel sounds are normal.   Tender to palpation in RLQ, no rebound or guarding noted   Musculoskeletal:   Right Femoral Trialysis CVC   Lymphadenopathy:     She has no cervical adenopathy.   Neurological: GCS eye subscore is 4. GCS verbal subscore is 5. GCS  motor subscore is 6.   Skin: Skin is warm and dry.       Significant Labs:   CBC:   Recent Labs   Lab 07/06/19  0347 07/07/19  0629   WBC 6.97 8.35   HGB 9.4* 9.5*   HCT 31.3* 30.7*    213     CMP:   Recent Labs   Lab 07/06/19  1637 07/06/19  1809  07/06/19  2354 07/07/19  0629 07/07/19  0932   * 135*   < > 135* 134*  134* 136   K 7.5* 6.6*  6.6*   < > 5.9*  5.9* 4.8  4.8  4.8 5.0   CL 95 93*   < > 94* 95  95 94*   CO2 27 26   < > 28 26  26 29   * 178*   < > 175* 192*  192* 200*   BUN 69* 71*   < > 67* 44*  44* 49*   CREATININE 8.4* 8.4*   < > 8.1* 6.3*  6.3* 6.8*   CALCIUM 9.2 9.1   < > 9.0 9.1  9.1 9.2   ALBUMIN 3.2* 3.2*  --   --  3.1*  --    ANIONGAP 13 16   < > 13 13  13 13   EGFRNONAA 4.7* 4.7*   < > 4.9* 6.6*  6.6* 6.0*    < > = values in this interval not displayed.       Significant Imaging: I have reviewed all pertinent imaging results/findings within the past 24 hours.    Assessment/Plan:      Hyperkalemia/ESRD / AV graft malfunction   -Hyperkalemia s/p medical therapy, urgent HD session complicated by lack of full trialysis use.   K 4.8 this AM, keep trending   -Nephrology consults following, Alteplase instilled to femoral catheter yesterday  -continue bicarbonate supplements  -continue sevelamer      RLQ Abdominal Pain, Cholelithiasis   Constipation-slow transit  -Suspect constipation is contributing - stopping opiates as they will only worsen this   >lactulose x 1, start scheduled miralax/senna  -no cholestasis by labs  -patient tolerating diet   -CT abdomen w/o acute explanation.   -Add-on Hepatic panel pending.     Acute respiratory failure with hypoxia   -has been % on Room air  S/p 1L UF yesterday  -Weaning parameter order placed.     Transfer to stepdown unit remains pending this AM. .     Active Hospital Problems    Diagnosis  POA    *Hyperkalemia [E87.5]  Yes     Priority: 1 - High    Right lower quadrant abdominal pain [R10.31]  Yes     Priority: 3      AV graft malfunction [T82.590A]  Yes     Priority: 3     Cholelithiasis [K80.20]  Yes     Priority: 3     ESRD (end stage renal disease) on dialysis [N18.6, Z99.2]  Not Applicable     Priority: 3     Acute respiratory failure with hypoxia [J96.01]  Yes     Priority: 3     Hernia of abdominal wall [K43.9]  Yes     Priority: 4     Chronic diastolic CHF (congestive heart failure) [I50.32]  Yes     Priority: 4     Partial symptomatic epilepsy with complex partial seizures, not intractable, without status epilepticus [G40.209]  Yes     Priority: 5     Sarcoidosis [D86.9]  Yes     Priority: 6      Chronic    Anemia in ESRD (end-stage renal disease) [N18.6, D63.1]  Yes     Priority: 7     Hyperlipidemia [E78.5]  Yes     Priority: 8     Controlled type 2 diabetes mellitus with chronic kidney disease on chronic dialysis, without long-term current use of insulin [E11.22, N18.6, Z99.2]  Not Applicable     Priority: 9     Essential hypertension [I10]  Yes     Priority: 10     LEFT Hemiplegia as late effect of stroke [I69.359]  Not Applicable     Priority: 11     Vitamin D deficiency [E55.9]  Yes     Priority: 12     Slow transit constipation [K59.01]  Yes      Resolved Hospital Problems   No resolved problems to display.       VTE Risk Mitigation (From admission, onward)        Ordered     Place sequential compression device  Until discontinued      07/05/19 1350     IP VTE HIGH RISK PATIENT  Once      07/05/19 1350             Devante Vasquez MD  Department of Hospital Medicine   [facility]

## 2019-07-07 NOTE — PROGRESS NOTES
0230H - venous pressure continuously rising up to 350mmHg. Blood returned.    UF NET 1L.    Total treatment time rendered: 2hrs (over 3hrs)    Nephro telephone orders received: Instill cathflo on both ports of femoral cath and keep it in-catheter until the next HD jimenez.    AV needle pulled out, week pulse, + high pitched bruit.    Post HD VS: BP - 103/56 DC 72

## 2019-07-07 NOTE — PLAN OF CARE
Problem: Adult Inpatient Plan of Care  Goal: Plan of Care Review  Outcome: Ongoing (interventions implemented as appropriate)  Plan of care reviewed with patient.  No S/S of distress.  Vital signs are stable.  Pain relieved with medication.  Dialysis could not be completed due to clotting.  Cathflo in both lumens of femoral trialysis noted.  Family at bedside.

## 2019-07-07 NOTE — PROGRESS NOTES
HD resumed using graft for arterial and femoral catheter for venous return.    Used gauge 16 needle. BFR kept 300cc/min with arterial pressure at -230 to -240mmHg.    Remaining treatment time: 2:20mins.

## 2019-07-07 NOTE — PROGRESS NOTES
Clotted 30mins post start, + venous chamber clot. Was able to rinse back blood.    Will try to cannulate the av graft for arterial and use catheter for venous blood return.

## 2019-07-07 NOTE — PROGRESS NOTES
[facility] Hospital Medicine  Progress Note    Patient Name: Lucy Perez  MRN: [id number]  Patient Class: IP- Inpatient   Admission Date: 7/5/2019  Length of Stay: 0 days  Attending Physician: Devante Vasquez MD  Primary Care Provider: Beverly Muniz MD    [facility] Medicine Team: [facility] MED A Devante Vasquez MD    Subjective:     Principal Problem:Acute respiratory failure with hypoxia    HPI: 59 y/o F with PMH of ESRD on HD MWF, last session Wednesday (7/3/19), T2DM on insulin, sarcoidosis, CHF (last EF 60-65%, grade 2 diastolic dysfunction), RA, 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 Patient reports pain woke her up from sleep at 4 AM this morning. She describes it a constant, sharp pain that originally radiated to her back but has since localized to just RLQ. Pain has not worsened since onset but is worse with laying down and improved with sitting up. She has had similar episode of pain in the past (a few months ago) and was told it was gallstones. She was told to avoid fatty/spicy foods, which she reports compliance with. Patient also complaining of SOB, cough, and wheezing for past 2 days beginning after last dialysis session. She is on 2L O2 at home PRN for CHF symptoms. She endorses chronic constipation with last BM 2 days ago and described as hard (typical) without noted melena or hematochezia. Her last dialysis session was 7/3/19 without any complications, dry weight reported as 101.9 kg. She still produces small amount of urine. Of note, she had recent graft declotting procedure on 6/20/19 and was admitted to the hospital the following day for hematuria. She was discharged home with urology follow-up. She denies fever/chills, N/V/D, CP, edema, diaphoresis, dizziness/lightheadedness, syncope, vision changes, dysphagia, slurred speech, focal weakness.         In the ED: O2 saturation dropped to 86%, placed  on 2L NC with improvement. Remaining vitals WNL on arrival. Afebrile without leukocytosis. Lactate 1.5.  Lipase mildly elevated at 64. CT abdomen with contrast remarkable for: several small layering gallstones without evidence of cholecystitis.Calcification in the expected course of the mid right ureter that is likely vascular calcification in the right ovarian vein. Small fat containing ventral wall hernia.Irregular opacities at the lung bases likely atelectasis. EKG with sinus rhythm, 1st degree AV block. K 5.6. BUN 57. Cr 7.3. Glucose 234. Given morphine for pain control.     Hospital Course:      Interval History: Overnight admitted for concerns of hyperkalemia, missed HD session, attempted HD was terminated after 30-40min due to high pressures s/p cannulation of AVG, pt with recent 6/20 - vascular procedure for stenosis.   Notified by Nephrology to consult Vascular Surgery    Vascular surgery - tentative plan for Monday - Fistulogram     This morning K 5.3 ,and this Afternoon     For Abd pain  Patient reports ongoing RLQ abdominal pain not relieved with tramadol    She has glazed look, and poor attention to questions, slowed mentation    Review of Systems   Constitutional: Positive for fatigue.   HENT: Negative for sinus pressure and sore throat.    Eyes: Negative for visual disturbance.   Respiratory: Negative for cough, chest tightness and shortness of breath.    Cardiovascular: Negative for chest pain and leg swelling.   Gastrointestinal: Positive for abdominal pain. Negative for constipation, diarrhea, nausea and vomiting.   Genitourinary: Negative for dysuria.   Musculoskeletal: Negative for back pain.   Neurological: Negative for dizziness.   Psychiatric/Behavioral: Positive for confusion.     Objective:     Vital Signs (Most Recent):  Temp: 98.6 °F (37 °C) (07/06/19 2010)  Pulse: 69 (07/06/19 2010)  Resp: (!) 24 (07/06/19 2010)  BP: (!) 110/56 (07/06/19 2010)  SpO2: 99 % (07/06/19 2010) Vital Signs (24h  Range):  Temp:  [97.5 °F (36.4 °C)-98.6 °F (37 °C)] 98.6 °F (37 °C)  Pulse:  [60-69] 69  Resp:  [14-24] 24  SpO2:  [98 %-100 %] 99 %  BP: (100-127)/(53-61) 110/56     Weight: 99.8 kg (220 lb)  Body mass index is 38.97 kg/m².  No intake or output data in the 24 hours ending 07/06/19 2137   Physical Exam   Constitutional: She appears listless. No distress.   Eyes: Pupils are equal, round, and reactive to light. No scleral icterus. Right eye exhibits nystagmus. Left eye exhibits nystagmus.   Cardiovascular: Normal rate and regular rhythm.   Murmur heard.  Pulmonary/Chest: Effort normal. No stridor. She has no wheezes.   Decreased breath sounds at bases   Abdominal: Soft. Bowel sounds are normal.   Tender to palpation in RLQ, no rebound or guarding noted   Lymphadenopathy:     She has no cervical adenopathy.   Neurological: She appears listless. GCS eye subscore is 4. GCS verbal subscore is 4. GCS motor subscore is 6.   Skin: Skin is warm and dry.       Significant Labs:   CBC:   Recent Labs   Lab 07/05/19  0845 07/06/19  0347   WBC 7.83 6.97   HGB 10.8* 9.4*   HCT 35.6* 31.3*    207     CMP:   Recent Labs   Lab 07/05/19  0845 07/06/19  0347 07/06/19  1637 07/06/19  1809    137 135* 135*   K 5.6* 5.3* 7.5* 6.6*  6.6*   CL 94* 95 95 93*   CO2 29 31* 27 26   * 205* 182* 178*   BUN 57* 58* 69* 71*   CREATININE 7.3* 7.2* 8.4* 8.4*   CALCIUM 10.0 9.4 9.2 9.1   PROT 8.1  --   --   --    ALBUMIN 3.7 3.1* 3.2* 3.2*   BILITOT 0.5  --   --   --    ALKPHOS 92  --   --   --    AST 12  --   --   --    ALT 9*  --   --   --    ANIONGAP 16 11 13 16   EGFRNONAA 5.5* 5.6* 4.7* 4.7*       Significant Imaging: I have reviewed all pertinent imaging results/findings within the past 24 hours.    Assessment/Plan:      Hyperkalemia/ESRD / AV graft malfunction   -Hyperkalemia this evening, Medical Therapy initiated, EKG changes - Peaked T-waves inferior, lateral leads noted   -Nephrology consulted  -Pt consented for  Trialysis, consulte Anesthesia for urgent trialysis placement, notified on-call staff, patient to receive urgent HD pending line placement.   -Consent took approx 40min, discussing with patient//daughter on speakerphone multitude of issues related to procedure, and also expected hospital course, pt with many questions about viability and prognosis of her AVG malfunctions recently, that I cannot appropriately answer, deferred pt to ask vascular surgery.     RLQ Abdominal Pain, Cholelithiasis   -PRN meds  -no cholestasis by labs  -possible biliary colic, but unclear why persistent  -CT abdomen w/o acute explanation.     Acute respiratory failure with hypoxia   -on supplemental oxygen,   -could be secondary to no HD since Wed   -wean as tolerated.     Active Hospital Problems    Diagnosis  POA    *Hyperkalemia [E87.5]  Yes     Priority: 1 - High    Right lower quadrant abdominal pain [R10.31]  Yes     Priority: 3     AV graft malfunction [T82.590A]  Yes     Priority: 3     Cholelithiasis [K80.20]  Yes     Priority: 3     ESRD (end stage renal disease) on dialysis [N18.6, Z99.2]  Not Applicable     Priority: 3     Acute respiratory failure with hypoxia [J96.01]  Yes     Priority: 3     Hernia of abdominal wall [K43.9]  Yes     Priority: 4     Chronic diastolic CHF (congestive heart failure) [I50.32]  Yes     Priority: 4     Partial symptomatic epilepsy with complex partial seizures, not intractable, without status epilepticus [G40.209]  Yes     Priority: 5     Sarcoidosis [D86.9]  Yes     Priority: 6      Chronic    Anemia in ESRD (end-stage renal disease) [N18.6, D63.1]  Yes     Priority: 7     Hyperlipidemia [E78.5]  Yes     Priority: 8     Controlled type 2 diabetes mellitus with chronic kidney disease on chronic dialysis, without long-term current use of insulin [E11.22, N18.6, Z99.2]  Not Applicable     Priority: 9     Essential hypertension [I10]  Yes     Priority: 10     LEFT Hemiplegia as  late effect of stroke [I69.359]  Not Applicable     Priority: 11     Vitamin D deficiency [E55.9]  Yes     Priority: 12       Resolved Hospital Problems   No resolved problems to display.       VTE Risk Mitigation (From admission, onward)        Ordered     Place sequential compression device  Until discontinued      07/05/19 1350     IP VTE HIGH RISK PATIENT  Once      07/05/19 1350             Devante Vasquez MD  Department of Hospital Medicine   [facility]

## 2019-07-07 NOTE — NURSING
Report called to given to nurse for room 346; called telemetry and spoke with Linda that pt transported with telemetry box to room 346.

## 2019-07-07 NOTE — CARE UPDATE
Patient needs trialysis line for dialysis treatment.      Recommend trialysis line placed either on left IJ or groin (if femoral please use a 25cm catheter).  Patient with Rt IJ stenosis would be difficult to attain.    Gonsalo Cartagena MD  Nephrology  [facility]

## 2019-07-08 LAB
ALBUMIN SERPL BCP-MCNC: 3.1 G/DL (ref 3.5–5.2)
ALP SERPL-CCNC: 83 U/L (ref 55–135)
ALT SERPL W/O P-5'-P-CCNC: <5 U/L (ref 10–44)
ANION GAP SERPL CALC-SCNC: 12 MMOL/L (ref 8–16)
ANION GAP SERPL CALC-SCNC: 8 MMOL/L (ref 8–16)
ANION GAP SERPL CALC-SCNC: 8 MMOL/L (ref 8–16)
AST SERPL-CCNC: 11 U/L (ref 10–40)
BASOPHILS # BLD AUTO: 0.04 K/UL (ref 0–0.2)
BASOPHILS NFR BLD: 0.5 % (ref 0–1.9)
BILIRUB SERPL-MCNC: 0.5 MG/DL (ref 0.1–1)
BUN SERPL-MCNC: 36 MG/DL (ref 6–20)
BUN SERPL-MCNC: 36 MG/DL (ref 6–20)
BUN SERPL-MCNC: 45 MG/DL (ref 6–20)
CALCIUM SERPL-MCNC: 9.4 MG/DL (ref 8.7–10.5)
CHLORIDE SERPL-SCNC: 101 MMOL/L (ref 95–110)
CHLORIDE SERPL-SCNC: 101 MMOL/L (ref 95–110)
CHLORIDE SERPL-SCNC: 99 MMOL/L (ref 95–110)
CO2 SERPL-SCNC: 23 MMOL/L (ref 23–29)
CO2 SERPL-SCNC: 28 MMOL/L (ref 23–29)
CO2 SERPL-SCNC: 28 MMOL/L (ref 23–29)
CREAT SERPL-MCNC: 5.6 MG/DL (ref 0.5–1.4)
CREAT SERPL-MCNC: 5.6 MG/DL (ref 0.5–1.4)
CREAT SERPL-MCNC: 6.3 MG/DL (ref 0.5–1.4)
DIFFERENTIAL METHOD: ABNORMAL
EOSINOPHIL # BLD AUTO: 0.2 K/UL (ref 0–0.5)
EOSINOPHIL NFR BLD: 2.7 % (ref 0–8)
ERYTHROCYTE [DISTWIDTH] IN BLOOD BY AUTOMATED COUNT: 12.8 % (ref 11.5–14.5)
EST. GFR  (AFRICAN AMERICAN): 7.6 ML/MIN/1.73 M^2
EST. GFR  (AFRICAN AMERICAN): 8.8 ML/MIN/1.73 M^2
EST. GFR  (AFRICAN AMERICAN): 8.8 ML/MIN/1.73 M^2
EST. GFR  (NON AFRICAN AMERICAN): 6.6 ML/MIN/1.73 M^2
EST. GFR  (NON AFRICAN AMERICAN): 7.6 ML/MIN/1.73 M^2
EST. GFR  (NON AFRICAN AMERICAN): 7.6 ML/MIN/1.73 M^2
GLUCOSE SERPL-MCNC: 195 MG/DL (ref 70–110)
GLUCOSE SERPL-MCNC: 195 MG/DL (ref 70–110)
GLUCOSE SERPL-MCNC: 258 MG/DL (ref 70–110)
HCT VFR BLD AUTO: 26.9 % (ref 37–48.5)
HGB BLD-MCNC: 8.2 G/DL (ref 12–16)
IMM GRANULOCYTES # BLD AUTO: 0.04 K/UL (ref 0–0.04)
IMM GRANULOCYTES NFR BLD AUTO: 0.5 % (ref 0–0.5)
LYMPHOCYTES # BLD AUTO: 1.1 K/UL (ref 1–4.8)
LYMPHOCYTES NFR BLD: 14.8 % (ref 18–48)
MAGNESIUM SERPL-MCNC: 2.3 MG/DL (ref 1.6–2.6)
MCH RBC QN AUTO: 32 PG (ref 27–31)
MCHC RBC AUTO-ENTMCNC: 30.5 G/DL (ref 32–36)
MCV RBC AUTO: 105 FL (ref 82–98)
MONOCYTES # BLD AUTO: 0.4 K/UL (ref 0.3–1)
MONOCYTES NFR BLD: 5.9 % (ref 4–15)
NEUTROPHILS # BLD AUTO: 5.6 K/UL (ref 1.8–7.7)
NEUTROPHILS NFR BLD: 75.6 % (ref 38–73)
NRBC BLD-RTO: 0 /100 WBC
PHOSPHATE SERPL-MCNC: 4.7 MG/DL (ref 2.7–4.5)
PLATELET # BLD AUTO: 188 K/UL (ref 150–350)
PMV BLD AUTO: 10.3 FL (ref 9.2–12.9)
POCT GLUCOSE: 163 MG/DL (ref 70–110)
POCT GLUCOSE: 164 MG/DL (ref 70–110)
POCT GLUCOSE: 174 MG/DL (ref 70–110)
POCT GLUCOSE: 237 MG/DL (ref 70–110)
POCT GLUCOSE: 307 MG/DL (ref 70–110)
POCT GLUCOSE: 402 MG/DL (ref 70–110)
POTASSIUM SERPL-SCNC: 4.9 MMOL/L (ref 3.5–5.1)
POTASSIUM SERPL-SCNC: 4.9 MMOL/L (ref 3.5–5.1)
POTASSIUM SERPL-SCNC: 5.7 MMOL/L (ref 3.5–5.1)
POTASSIUM SERPL-SCNC: 6.2 MMOL/L (ref 3.5–5.1)
PROT SERPL-MCNC: 7 G/DL (ref 6–8.4)
RBC # BLD AUTO: 2.56 M/UL (ref 4–5.4)
SODIUM SERPL-SCNC: 134 MMOL/L (ref 136–145)
SODIUM SERPL-SCNC: 137 MMOL/L (ref 136–145)
SODIUM SERPL-SCNC: 137 MMOL/L (ref 136–145)
WBC # BLD AUTO: 7.45 K/UL (ref 3.9–12.7)

## 2019-07-08 PROCEDURE — 80048 BASIC METABOLIC PNL TOTAL CA: CPT | Mod: HCNC,NTX

## 2019-07-08 PROCEDURE — 36908 STENT PLMT CTR DIALYSIS SEG: CPT | Mod: HCNC,NTX | Performed by: SURGERY

## 2019-07-08 PROCEDURE — 25000003 PHARM REV CODE 250: Mod: HCNC,NTX | Performed by: HOSPITALIST

## 2019-07-08 PROCEDURE — 25500020 PHARM REV CODE 255: Mod: HCNC,NTX | Performed by: SURGERY

## 2019-07-08 PROCEDURE — 93010 RHYTHM STRIP: ICD-10-PCS | Mod: HCNC,NTX,, | Performed by: INTERNAL MEDICINE

## 2019-07-08 PROCEDURE — C1876 STENT, NON-COA/NON-COV W/DEL: HCPCS | Mod: HCNC,NTX | Performed by: SURGERY

## 2019-07-08 PROCEDURE — 84132 ASSAY OF SERUM POTASSIUM: CPT | Mod: HCNC,NTX

## 2019-07-08 PROCEDURE — C1894 INTRO/SHEATH, NON-LASER: HCPCS | Mod: HCNC,NTX | Performed by: SURGERY

## 2019-07-08 PROCEDURE — 36908 PR TRANSCATH STENT CENTRAL DIALYSIS SEGMENT: ICD-10-PCS | Mod: HCNC,NTX,, | Performed by: SURGERY

## 2019-07-08 PROCEDURE — 36908 STENT PLMT CTR DIALYSIS SEG: CPT | Mod: HCNC,NTX,, | Performed by: SURGERY

## 2019-07-08 PROCEDURE — 85025 COMPLETE CBC W/AUTO DIFF WBC: CPT | Mod: HCNC,NTX

## 2019-07-08 PROCEDURE — 99233 SBSQ HOSP IP/OBS HIGH 50: CPT | Mod: HCNC,NTX,, | Performed by: HOSPITALIST

## 2019-07-08 PROCEDURE — 99232 SBSQ HOSP IP/OBS MODERATE 35: CPT | Mod: HCNC,NTX,, | Performed by: SURGERY

## 2019-07-08 PROCEDURE — 99152 MOD SED SAME PHYS/QHP 5/>YRS: CPT | Mod: HCNC,NTX,, | Performed by: SURGERY

## 2019-07-08 PROCEDURE — 36903 INTRO CATH DIALYSIS CIRCUIT: CPT | Mod: HCNC,NTX,, | Performed by: SURGERY

## 2019-07-08 PROCEDURE — 80053 COMPREHEN METABOLIC PANEL: CPT | Mod: HCNC,NTX

## 2019-07-08 PROCEDURE — 25000003 PHARM REV CODE 250: Mod: HCNC,NTX | Performed by: SURGERY

## 2019-07-08 PROCEDURE — 63600175 PHARM REV CODE 636 W HCPCS: Mod: HCNC,NTX | Performed by: HOSPITALIST

## 2019-07-08 PROCEDURE — 36903 INTRO CATH DIALYSIS CIRCUIT: CPT | Mod: HCNC,NTX | Performed by: SURGERY

## 2019-07-08 PROCEDURE — 25000003 PHARM REV CODE 250: Mod: HCNC,NTX | Performed by: PHYSICIAN ASSISTANT

## 2019-07-08 PROCEDURE — 63600175 PHARM REV CODE 636 W HCPCS: Mod: HCNC,NTX | Performed by: SURGERY

## 2019-07-08 PROCEDURE — 99232 PR SUBSEQUENT HOSPITAL CARE,LEVL II: ICD-10-PCS | Mod: HCNC,NTX,, | Performed by: SURGERY

## 2019-07-08 PROCEDURE — C1769 GUIDE WIRE: HCPCS | Mod: HCNC,NTX | Performed by: SURGERY

## 2019-07-08 PROCEDURE — 83735 ASSAY OF MAGNESIUM: CPT | Mod: HCNC,NTX

## 2019-07-08 PROCEDURE — 93990 DOPPLER FLOW TESTING: CPT | Mod: HCNC,NTX | Performed by: SURGERY

## 2019-07-08 PROCEDURE — 99152 MOD SED SAME PHYS/QHP 5/>YRS: CPT | Mod: HCNC,NTX | Performed by: SURGERY

## 2019-07-08 PROCEDURE — 99233 PR SUBSEQUENT HOSPITAL CARE,LEVL III: ICD-10-PCS | Mod: HCNC,NTX,, | Performed by: HOSPITALIST

## 2019-07-08 PROCEDURE — C1887 CATHETER, GUIDING: HCPCS | Mod: HCNC,NTX | Performed by: SURGERY

## 2019-07-08 PROCEDURE — 20600001 HC STEP DOWN PRIVATE ROOM: Mod: HCNC,NTX

## 2019-07-08 PROCEDURE — 36415 COLL VENOUS BLD VENIPUNCTURE: CPT | Mod: HCNC,NTX

## 2019-07-08 PROCEDURE — 84100 ASSAY OF PHOSPHORUS: CPT | Mod: HCNC,NTX

## 2019-07-08 PROCEDURE — 36903 PR INTRO CATH, DIALYSIS CIRCUIT W/TRANSCATH PLCMNT, STENT: ICD-10-PCS | Mod: HCNC,NTX,, | Performed by: SURGERY

## 2019-07-08 PROCEDURE — 99153 MOD SED SAME PHYS/QHP EA: CPT | Mod: HCNC,NTX | Performed by: SURGERY

## 2019-07-08 PROCEDURE — 93005 ELECTROCARDIOGRAM TRACING: CPT | Mod: HCNC,NTX

## 2019-07-08 PROCEDURE — C1725 CATH, TRANSLUMIN NON-LASER: HCPCS | Mod: HCNC,NTX | Performed by: SURGERY

## 2019-07-08 PROCEDURE — 99152 PR MOD CONSCIOUS SEDATION, SAME PHYS, 5+ YRS, FIRST 15 MIN: ICD-10-PCS | Mod: HCNC,NTX,, | Performed by: SURGERY

## 2019-07-08 PROCEDURE — 93010 ELECTROCARDIOGRAM REPORT: CPT | Mod: HCNC,NTX,, | Performed by: INTERNAL MEDICINE

## 2019-07-08 DEVICE — IMPLANTABLE DEVICE: Type: IMPLANTABLE DEVICE | Site: ARM | Status: FUNCTIONAL

## 2019-07-08 DEVICE — LIFESTENT®  BILIARY STENT, 9MM X 30MM (80CM CATHETER LENGTH)
Type: IMPLANTABLE DEVICE | Site: ARM | Status: FUNCTIONAL
Brand: LIFESTENT® BILIARY STENT

## 2019-07-08 RX ORDER — SODIUM CHLORIDE 9 MG/ML
INJECTION, SOLUTION INTRAVENOUS ONCE
Status: COMPLETED | OUTPATIENT
Start: 2019-07-09 | End: 2019-07-09

## 2019-07-08 RX ORDER — FENTANYL CITRATE 50 UG/ML
INJECTION, SOLUTION INTRAMUSCULAR; INTRAVENOUS
Status: DISCONTINUED | OUTPATIENT
Start: 2019-07-08 | End: 2019-07-08 | Stop reason: HOSPADM

## 2019-07-08 RX ORDER — LIDOCAINE HYDROCHLORIDE 20 MG/ML
INJECTION, SOLUTION EPIDURAL; INFILTRATION; INTRACAUDAL; PERINEURAL
Status: DISCONTINUED | OUTPATIENT
Start: 2019-07-08 | End: 2019-07-08 | Stop reason: HOSPADM

## 2019-07-08 RX ORDER — SODIUM POLYSTYRENE SULFONATE 4.1 MEQ/G
30 POWDER, FOR SUSPENSION ORAL; RECTAL
Status: DISPENSED | OUTPATIENT
Start: 2019-07-08 | End: 2019-07-09

## 2019-07-08 RX ORDER — MIDAZOLAM HYDROCHLORIDE 1 MG/ML
INJECTION, SOLUTION INTRAMUSCULAR; INTRAVENOUS
Status: DISCONTINUED | OUTPATIENT
Start: 2019-07-08 | End: 2019-07-08 | Stop reason: HOSPADM

## 2019-07-08 RX ORDER — HEPARIN SODIUM 200 [USP'U]/100ML
INJECTION, SOLUTION INTRAVENOUS CONTINUOUS PRN
Status: DISCONTINUED | OUTPATIENT
Start: 2019-07-08 | End: 2019-07-11 | Stop reason: HOSPADM

## 2019-07-08 RX ADMIN — SENNOSIDES 8.6 MG: 8.6 TABLET, FILM COATED ORAL at 08:07

## 2019-07-08 RX ADMIN — Medication 10 MG: at 03:07

## 2019-07-08 RX ADMIN — SEVELAMER CARBONATE 800 MG: 800 TABLET, FILM COATED ORAL at 12:07

## 2019-07-08 RX ADMIN — PREGABALIN 25 MG: 25 CAPSULE ORAL at 08:07

## 2019-07-08 RX ADMIN — SODIUM BICARBONATE 650 MG TABLET 1300 MG: at 09:07

## 2019-07-08 RX ADMIN — POLYETHYLENE GLYCOL 3350 17 G: 17 POWDER, FOR SOLUTION ORAL at 09:07

## 2019-07-08 RX ADMIN — FOLIC ACID 1 MG: 1 TABLET ORAL at 08:07

## 2019-07-08 RX ADMIN — DEXTROSE 250 ML: 10 SOLUTION INTRAVENOUS at 06:07

## 2019-07-08 RX ADMIN — BUPROPION HYDROCHLORIDE 300 MG: 150 TABLET, EXTENDED RELEASE ORAL at 08:07

## 2019-07-08 RX ADMIN — SENNOSIDES 8.6 MG: 8.6 TABLET, FILM COATED ORAL at 09:07

## 2019-07-08 RX ADMIN — SEVELAMER CARBONATE 800 MG: 800 TABLET, FILM COATED ORAL at 05:07

## 2019-07-08 RX ADMIN — SODIUM BICARBONATE 650 MG TABLET 1300 MG: at 08:07

## 2019-07-08 RX ADMIN — ACETAMINOPHEN 650 MG: 325 TABLET ORAL at 03:07

## 2019-07-08 RX ADMIN — LEVETIRACETAM 500 MG: 500 TABLET, FILM COATED ORAL at 09:07

## 2019-07-08 RX ADMIN — INSULIN HUMAN 8 UNITS: 100 INJECTION, SOLUTION PARENTERAL at 07:07

## 2019-07-08 RX ADMIN — ACETAMINOPHEN 650 MG: 325 TABLET ORAL at 12:07

## 2019-07-08 RX ADMIN — LEVETIRACETAM 500 MG: 500 TABLET, FILM COATED ORAL at 06:07

## 2019-07-08 RX ADMIN — SODIUM POLYSTYRENE SULFONATE 30 G: 1 POWDER ORAL; RECTAL at 06:07

## 2019-07-08 RX ADMIN — ASPIRIN 81 MG: 81 TABLET, COATED ORAL at 06:07

## 2019-07-08 RX ADMIN — POLYETHYLENE GLYCOL 3350 17 G: 17 POWDER, FOR SOLUTION ORAL at 08:07

## 2019-07-08 RX ADMIN — LEVETIRACETAM 500 MG: 500 TABLET, FILM COATED ORAL at 03:07

## 2019-07-08 RX ADMIN — ATORVASTATIN CALCIUM 40 MG: 20 TABLET, FILM COATED ORAL at 09:07

## 2019-07-08 RX ADMIN — ACETAMINOPHEN 650 MG: 325 TABLET ORAL at 08:07

## 2019-07-08 RX ADMIN — FUROSEMIDE 40 MG: 40 TABLET ORAL at 08:07

## 2019-07-08 RX ADMIN — FAMOTIDINE 20 MG: 20 TABLET, FILM COATED ORAL at 08:07

## 2019-07-08 NOTE — OP NOTE
Date: 07/08/2019    Surgeon(s) and Role:     * WELLINGTON Palm III, MD - Primary     Assisting Surgeon: Brayden Palafox MD     Pre-op Diagnosis:   Stenosis, AVG     Post-op Diagnosis:   Same, + R innominate vein occlusion     PROCEDURES:  1. Stent placement, R AVG (9x30 Lifestent)  2. Stent placement,  R innominate vein (12 x 60 Lifestar)  3. Fistulagram  4. Conscious sedation     Anesthesia: RN IV Sedation     Description of Procedure: as above.  successful recanalization of R innominate occlusion, and Rx of recurrent venous anast/ stenosis     Description of the findings of the procedure: successful outpatient procedure     Estimated Blood Loss: <4cc    PROCEDURE IN DETAIL:  The patient was brought to the Cath Lab, placed in supine. Right arm was prepped and draped in the standard surgical fashion. Under ultrasound guidance, the proximal aspect of the right arm AV graft was accessed with a micropuncture needle; ultrasound confirmed vessel patency, followed by placement of 4/3-Yakut micropuncture dilator. Fistulogram was performed and revealed outflow venous stenosis as well as right innominate vein occlusion. Through this, an 0.035-inch glidewire was placed and 7Fx35 cm sheath.  Through this sheath angled glide catheter and Glidewire was used to traverse the right subclavian occlusion.  Right subclavian glue shin was initially angioplastied with 10 x 60 mm mustang balloon.  Successful recanalization was noted. Given this recurrence, decision was made to stent this area. 12 x 60 mm Life Star stent was precisely deployed and post dilated with a 10 mm balloon.  Successful recanalization was noted and collaterals were no longer opacified by contrast.  Then focus was shifted towards the venous anastomotic stenosis.  9 x 30 mm LifeStent was placed and post dilated with a 8 x 40 mm [place] balloon.  Excellent result was noted. No residual stenosis was noted. Resolution of the stenosis was noted. Strong thrill could be felt.  The sheath was removed, 3-0 nylon U-stitch was placed with good hemostasis.    MODERATE SEDATION IN CATH LAB   ROBEL Palm MD was present for moderate sedation  ROBEL Palm MD monitored the patients cardio respiratory functions during the moderate sedation   See nurses notes for Intra-service start and end times and for the log of the name of the RN who administered the medicines for the moderate sedation, including their doseage and route.    Time of sedation:  90 mins.    Brayden Palafox MD  Vascular/Endovascular Surgery Fellow

## 2019-07-08 NOTE — SUBJECTIVE & OBJECTIVE
Facility-Administered Medications Prior to Admission   Medication Dose Route Frequency Provider Last Rate Last Dose    onabotulinumtoxina injection 300 Units  300 Units Intramuscular 1 time in Clinic/HOD Monty Pardo MD         Medications Prior to Admission   Medication Sig Dispense Refill Last Dose    albuterol (PROVENTIL) 2.5 mg /3 mL (0.083 %) nebulizer solution Take 3 mLs (2.5 mg total) by nebulization every 6 (six) hours as needed for Wheezing. Rescue 25 each 3 Past Week at Unknown time    albuterol (VENTOLIN HFA) 90 mcg/actuation inhaler Inhale 2 puffs into the lungs every 6 (six) hours as needed for Wheezing. Rescue 18 g 0 Past Week at Unknown time    aspirin (ECOTRIN) 81 MG EC tablet Take 81 mg by mouth every morning.    7/5/2019 at Unknown time    atorvastatin (LIPITOR) 40 MG tablet TAKE 1 TABLET ONE TIME DAILY FOR CHOLESTEROL 90 tablet 2 7/4/2019 at Unknown time    buPROPion (WELLBUTRIN XL) 300 MG 24 hr tablet Take 1 tablet (300 mg total) by mouth once daily. 30 tablet 6 7/4/2019 at Unknown time    carvedilol (COREG) 25 MG tablet Take 1 tablet (25 mg total) by mouth 2 (two) times daily. 180 tablet 2 7/4/2019 at Unknown time    DULCOLAX, BISACODYL, ORAL Take by mouth as needed (constipation).   Past Month at Unknown time    famotidine (PEPCID) 20 MG tablet Take 1 tablet (20 mg total) by mouth once daily. 90 tablet 2 7/4/2019 at Unknown time    folic acid (FOLVITE) 1 MG tablet Take 1 tablet (1 mg total) by mouth once daily. 90 tablet 2 7/5/2019 at Unknown time    furosemide (LASIX) 40 MG tablet Take 1 tablet (40 mg total) by mouth once daily. (Patient taking differently: Take 40 mg by mouth every morning. ) 90 tablet 2 7/5/2019 at Unknown time    guaiFENesin (MUCINEX) 1,200 mg Ta12 1 tab every 12 hours as needed for congestion 24 tablet 0 Past Month at Unknown time    insulin glargine (LANTUS U-100 INSULIN) 100 unit/mL injection 10 units daily in the PM 10 mL 6 7/4/2019 at Unknown time     "levETIRAcetam (KEPPRA) 500 MG Tab Take 1 tablet (500 mg total) by mouth every 8 (eight) hours. 270 tablet 2 7/5/2019 at Unknown time    polyethylene glycol 3350 (MIRALAX ORAL) Take by mouth every evening.    7/4/2019 at Unknown time    sevelamer carbonate (RENVELA) 800 mg Tab Take 800 mg by mouth 3 (three) times daily with meals.   7/4/2019 at Unknown time    sodium bicarbonate 650 MG tablet Take 2 tablets (1,300 mg total) by mouth 2 (two) times daily. 360 tablet 2 7/4/2019 at Unknown time    traMADol (ULTRAM) 50 mg tablet 1 tab Q6-8 hours as needed for joint pain 30 tablet 0 Past Week at Unknown time    acetaminophen (TYLENOL) 325 MG tablet Take 2 tablets (650 mg total) by mouth every 6 (six) hours as needed for Pain.  0 More than a month at Unknown time    ALPRAZolam (XANAX) 0.5 MG tablet 1 tab Every 8 hours as needed for anxiety 30 tablet 0 More than a month at Unknown time    amoxicillin-clavulanate 500-125mg (AUGMENTIN) 500-125 mg Tab Take 1 tablet (500 mg total) by mouth once daily. 10 tablet 0     BD INSULIN PEN NEEDLE UF SHORT 31 gauge x 5/16" Ndle USE TO INJECT NOVOLOG FLEXPEN BEFORE MEALS 150 each 11 Taking    blood sugar diagnostic (ACCU-CHEK SMARTVIEW TEST STRIP) Strp 1 strip by Misc.(Non-Drug; Combo Route) route 2 (two) times daily. 300 each 3 6/19/2019 at Unknown time    ciprofloxacin HCl (CILOXAN) 0.3 % ophthalmic solution Place 2 drops into both eyes every 2 (two) hours. 2 drops/affected eye 3-4x/day x 3-5 days 5 mL 0 6/1/2019    dantrolene (DANTRIUM) 50 MG Cap Take 1 capsule (50 mg total) by mouth 3 (three) times daily. 270 capsule 3 More than a month at Unknown time    ergocalciferol (ERGOCALCIFEROL) 50,000 unit Cap 1 capsule (50,000 Units total) by Per G Tube route every 7 days. (Patient taking differently: Take 50,000 Units by mouth every 7 days. Takes on Thurs) 12 capsule 2 7/4/2019    ferrous sulfate 220 mg (44 mg iron)/5 mL solution 10ml daily for Iron/Give via PEG (Patient taking " differently: Take 220 mg by mouth every morning. ) 300 mL 6 Taking    flash glucose scanning reader (FREESTYLE JOSÉ LUIS 14 DAY READER) Misc Use as directed. Scan 4 times a day. 1 each 0 Taking    flash glucose sensor (FREESTYLE JOSÉ LUIS 14 DAY SENSOR) Kit Change every 14 days. 2 kit 11 Taking    lidocaine-prilocaine (EMLA) cream Apply topically as needed. 30 g 1 More than a month at Unknown time    pregabalin (LYRICA) 25 MG capsule 1 cap in AM and 2 caps at Bedtime for muscle spasm 90 capsule 4 6/20/2019 at 0830       Review of patient's allergies indicates:   Allergen Reactions    Lisinopril Other (See Comments)     Angioedema      Vicodin [hydrocodone-acetaminophen] Rash     No problem with acetaminophen        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    Cortical cataract of both eyes 7/26/2016    Decubitus ulcer of buttock, stage 2     Degeneration of lumbar or lumbosacral intervertebral disc 3/5/2013    S/p MRI L-spine 5/2009     Depression     Encounter for blood transfusion     ESRD on hemodialysis     Started August 2018    General anesthetics causing adverse effect in therapeutic use     Hemorrhagic cerebrovascular accident (CVA)     8/2016 s/p Hemicraniotomy at [facility] with Left hemiparesis    Hemorrhagic cerebrovascular accident (CVA) 1/3/2018    History of stroke 6/28/2017    s/p R-MCA stroke with R-putaminal hemorrhagic transformation in 8/2016 and 11/2016 (s/p hemicraniotomy at [facility]) with residual L hemiparesis, on AED s/p CVA      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    Hyperlipidemia     Hypertensive retinopathy of both eyes 7/26/2016    Impingement syndrome of right shoulder  7/2/2014    Left ventricular diastolic dysfunction with preserved systolic function 12/15/2015    Mixed anxiety and depressive disorder 3/5/2013    Obesity     THERESA (obstructive sleep apnea) 3/5/2013    No Home CPAP 2ndary to cost     Partial symptomatic epilepsy with complex partial seizures, not intractable, without status epilepticus     PEG (percutaneous endoscopic gastrostomy) status 6/28/2017    Renovascular hypertension 3/5/2013    Will resume home medications: amlodipine, labetalol, and hydralazine Will hold Lisinopril in setting of DARLING.   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 without macular edema, without long-term current use of insulin 8/2/2017    Type 2 diabetes mellitus with diabetic polyneuropathy, without long-term current use of insulin 10/22/2015    Type 2 diabetes mellitus with stage 3 chronic kidney disease, without long-term current use of insulin 10/22/2015    Vertebral artery stenosis 3/5/2013    S/p Stenting per Dr Burnett      Past Surgical History:   Procedure Laterality Date    ARTHROSCOPY-SHOULDER WITH SUBACROMIAL DECOMPRESSION Right 7/9/2014    Performed [facility] at [facility] OR    AV GRAFT CREATION Right 10/18/2018    [facility]formed [facility]ir [facility] at [facility] OR 2ND FLR    Biceps Tenotomy Right 7/9/2014    [facility] at [facility] OR    BREAST SURGERY      breast reduction    COLONOSCOPY N/A 8/11/2016    Performed by Jerry Vilchis MD at [facility] ENDO (4TH FLR)    DEBRIDEMENT-SHOULDER-ARTHROSCOPIC Labral Cuff Right 7/9/2014    Per[facility] by [facility]all [facility] at [facility] OR    DECLOT-GRAFT Right 6/20/2019     Performed by Cabrera Irwin [facility] at [facility] CATH LAB    ESOPHAGOGASTRODUODENOSCOPY (EGD) N/A 12/6/2017    Performed by Dallas Lock Jr., [facility] at [facility] ENDO    [id number]-[id number]-KGTVAA END Right 7/9/2014    [facility] at [facility]    Fistulogram Right 2/11/2019    Performed by Meir Valencia MD at [facility] CATH LAB    HYSTERECTOMY      ROTATOR CUFF REPAIR Right July 9, 2014    right side    Skull surgery      Aneurysm    stent placed      in vertebral artery    TOTAL REDUCTION MAMMOPLASTY      TUBAL LIGATION       Family History     Problem Relation (Age of Onset)    Bell's palsy Sister    Blindness Maternal Grandmother    Breast cancer Mother    Cancer Mother (55)    Diabetes Mother, Son,     Heart attack Mother    Heart disease Mother    Hypertension Mother, Sister    Lupus Sister    No Known Problems Daughter    Sleep apnea Sister    Stroke Sister, Maternal Aunt        Tobacco Use    Smoking status: Never Smoker    Smokeless tobacco: Never Used   Substance and Sexual Activity    Alcohol use: Not Currently     Comment: occasional wine cooler     Drug use: No    Sexual activity: Yes     Partners: Male       Objective:     Vital Signs (Most Recent):  Temp: 98.7 °F (37.1 °C) (07/08/19 0517)  Pulse: 71 (07/08/19 0721)  Resp: 16 (07/08/19 0517)  BP: (!) 114/57 (07/08/19 0517)  SpO2: 98 % (07/08/19 0517) Vital Signs (24h Range):  Temp:  [98.2 °F (36.8 °C)-99.1 °F (37.3 °C)] 98.7 °F (37.1 °C)  Pulse:  [68-88] 71  Resp:  [14-20] 16  SpO2:  [96 %-99 %] 98 %  BP: (101-158)/(54-85) 114/57     Weight: 83.8 kg (184 lb 11.9 oz)  Body mass index is 32.73 kg/m².    Physical Exam   Constitutional: She is oriented to person, place, and time. She appears well-developed and well-nourished.   HENT:   Head: Normocephalic and atraumatic.   Eyes: Pupils are equal, round, and reactive to light. EOM are normal.   Neck: Normal range of motion. Neck supple. No tracheal deviation present. No thyromegaly  present.   Cardiovascular: Normal rate, regular rhythm and intact distal pulses.   No murmur heard.  Pulmonary/Chest: Effort normal. She has no wheezes.   Crackles at bases b/l R>L   Abdominal: Soft. Bowel sounds are normal. There is tenderness in the right lower quadrant. There is no rebound, no guarding and no CVA tenderness.   Musculoskeletal: Normal range of motion. She exhibits no edema or tenderness.   Lymphadenopathy:     She has no cervical adenopathy.   Neurological: She is alert and oriented to person, place, and time. She has normal reflexes. No cranial nerve deficit.   Skin: Skin is warm and dry.   CAROLE AVG with weak thrill, high pitched bruit.   Psychiatric: She has a normal mood and affect. Her behavior is normal.   Nursing note and vitals reviewed.      Significant Labs:  CBC:   Recent Labs   Lab 07/08/19  0348   WBC 7.45   RBC 2.56*   HGB 8.2*   HCT 26.9*      *   MCH 32.0*   MCHC 30.5*     CMP:   Recent Labs   Lab 07/08/19  0348   *  195*   CALCIUM 9.4  9.4   ALBUMIN 3.1*   PROT 7.0     137   K 4.9  4.9   CO2 28  28     101   BUN 36*  36*   CREATININE 5.6*  5.6*   ALKPHOS 83   ALT <5*   AST 11   BILITOT 0.5       Significant Diagnostics:  I have reviewed all pertinent imaging results/findings within the past 24 hours.

## 2019-07-08 NOTE — PROGRESS NOTES
[facility]  Vascular Surgery  Progress Note    Patient Name: Lucy Perez  MRN: [id number]  Admission Date: 7/5/2019  Primary Care Provider: Beverly Muniz MD    Subjective:     Interval History: No acute events overnight. She was made NPO at midnight. Afebrile and VSS.     Post-Op Info:  Procedure(s) (LRB):  Fistulogram (Right)         Facility-Administered Medications Prior to Admission   Medication Dose Route Frequency Provider Last Rate Last Dose    onabotulinumtoxina injection 300 Units  300 Units Intramuscular 1 time in Clinic/HOD Monty Pardo MD         Medications Prior to Admission   Medication Sig Dispense Refill Last Dose    albuterol (PROVENTIL) 2.5 mg /3 mL (0.083 %) nebulizer solution Take 3 mLs (2.5 mg total) by nebulization every 6 (six) hours as needed for Wheezing. Rescue 25 each 3 Past Week at Unknown time    albuterol (VENTOLIN HFA) 90 mcg/actuation inhaler Inhale 2 puffs into the lungs every 6 (six) hours as needed for Wheezing. Rescue 18 g 0 Past Week at Unknown time    aspirin (ECOTRIN) 81 MG EC tablet Take 81 mg by mouth every morning.    7/5/2019 at Unknown time    atorvastatin (LIPITOR) 40 MG tablet TAKE 1 TABLET ONE TIME DAILY FOR CHOLESTEROL 90 tablet 2 7/4/2019 at Unknown time    buPROPion (WELLBUTRIN XL) 300 MG 24 hr tablet Take 1 tablet (300 mg total) by mouth once daily. 30 tablet 6 7/4/2019 at Unknown time    carvedilol (COREG) 25 MG tablet Take 1 tablet (25 mg total) by mouth 2 (two) times daily. 180 tablet 2 7/4/2019 at Unknown time    DULCOLAX, BISACODYL, ORAL Take by mouth as needed (constipation).   Past Month at Unknown time    famotidine (PEPCID) 20 MG tablet Take 1 tablet (20 mg total) by mouth once daily. 90 tablet 2 7/4/2019 at Unknown time    folic acid (FOLVITE) 1 MG tablet Take 1 tablet (1 mg total) by mouth once daily. 90 tablet 2 7/5/2019 at Unknown time    furosemide (LASIX) 40 MG tablet Take 1 tablet (40 mg total) by  "mouth once daily. (Patient taking differently: Take 40 mg by mouth every morning. ) 90 tablet 2 7/5/2019 at Unknown time    guaiFENesin (MUCINEX) 1,200 mg Ta12 1 tab every 12 hours as needed for congestion 24 tablet 0 Past Month at Unknown time    insulin glargine (LANTUS U-100 INSULIN) 100 unit/mL injection 10 units daily in the PM 10 mL 6 7/4/2019 at Unknown time    levETIRAcetam (KEPPRA) 500 MG Tab Take 1 tablet (500 mg total) by mouth every 8 (eight) hours. 270 tablet 2 7/5/2019 at Unknown time    polyethylene glycol 3350 (MIRALAX ORAL) Take by mouth every evening.    7/4/2019 at Unknown time    sevelamer carbonate (RENVELA) 800 mg Tab Take 800 mg by mouth 3 (three) times daily with meals.   7/4/2019 at Unknown time    sodium bicarbonate 650 MG tablet Take 2 tablets (1,300 mg total) by mouth 2 (two) times daily. 360 tablet 2 7/4/2019 at Unknown time    traMADol (ULTRAM) 50 mg tablet 1 tab Q6-8 hours as needed for joint pain 30 tablet 0 Past Week at Unknown time    acetaminophen (TYLENOL) 325 MG tablet Take 2 tablets (650 mg total) by mouth every 6 (six) hours as needed for Pain.  0 More than a month at Unknown time    ALPRAZolam (XANAX) 0.5 MG tablet 1 tab Every 8 hours as needed for anxiety 30 tablet 0 More than a month at Unknown time    amoxicillin-clavulanate 500-125mg (AUGMENTIN) 500-125 mg Tab Take 1 tablet (500 mg total) by mouth once daily. 10 tablet 0     BD INSULIN PEN NEEDLE UF SHORT 31 gauge x 5/16" Ndle USE TO INJECT NOVOLOG FLEXPEN BEFORE MEALS 150 each 11 Taking    blood sugar diagnostic (ACCU-CHEK SMARTVIEW TEST STRIP) Strp 1 strip by Misc.(Non-Drug; Combo Route) route 2 (two) times daily. 300 each 3 6/19/2019 at Unknown time    ciprofloxacin HCl (CILOXAN) 0.3 % ophthalmic solution Place 2 drops into both eyes every 2 (two) hours. 2 drops/affected eye 3-4x/day x 3-5 days 5 mL 0 6/1/2019    dantrolene (DANTRIUM) 50 MG Cap Take 1 capsule (50 mg total) by mouth 3 (three) times daily. " 270 capsule 3 More than a month at Unknown time    ergocalciferol (ERGOCALCIFEROL) 50,000 unit Cap 1 capsule (50,000 Units total) by Per G Tube route every 7 days. (Patient taking differently: Take 50,000 Units by mouth every 7 days. Takes on Thurs) 12 capsule 2 7/4/2019    ferrous sulfate 220 mg (44 mg iron)/5 mL solution 10ml daily for Iron/Give via PEG (Patient taking differently: Take 220 mg by mouth every morning. ) 300 mL 6 Taking    flash glucose scanning reader (FREESTYLE JOSÉ LUIS 14 DAY READER) Misc Use as directed. Scan 4 times a day. 1 each 0 Taking    flash glucose sensor (FREESTYLE JOSÉ LUIS 14 DAY SENSOR) Kit Change every 14 days. 2 kit 11 Taking    lidocaine-prilocaine (EMLA) cream Apply topically as needed. 30 g 1 More than a month at Unknown time    pregabalin (LYRICA) 25 MG capsule 1 cap in AM and 2 caps at Bedtime for muscle spasm 90 capsule 4 6/20/2019 at 0830       Review of patient's allergies indicates:   Allergen Reactions    Lisinopril Other (See Comments)     Angioedema      Vicodin [hydrocodone-acetaminophen] Rash     No problem with acetaminophen        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    Cortical cataract of both eyes 7/26/2016    Decubitus ulcer of buttock, stage 2     Degeneration of lumbar or lumbosacral intervertebral disc 3/5/2013    S/p MRI L-spine 5/2009     Depression     Encounter for blood transfusion     ESRD on hemodialysis     Started August 2018    General anesthetics causing adverse effect in therapeutic use     Hemorrhagic cerebrovascular accident (CVA)     8/2016 s/p [facility]iot[facility] with Left hemiparesis    Hemorrhagic  cerebrovascular accident (CVA) 1/3/2018    History of stroke 6/28/2017    s/p R-MCA stroke with R-putaminal hemorrhagic transformation in 8/2016 and 11/2016 ([place] [facility] with residual L hemiparesis, on AED s/p CVA      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    Hyperlipidemia     Hypertensive retinopathy of both eyes 7/26/2016    Impingement syndrome of right shoulder 7/2/2014    Left ventricular diastolic dysfunction with preserved systolic function 12/15/2015    Mixed anxiety and depressive disorder 3/5/2013    Obesity     THERESA (obstructive sleep apnea) 3/5/2013    No Home CPAP 2ndary to cost     Partial symptomatic epilepsy with complex partial seizures, not intractable, without status epilepticus     PEG (percutaneous endoscopic gastrostomy) status 6/28/2017    Renovascular hypertension 3/5/2013    Will resume home medications: amlodipine, labetalol, and hydralazine Will hold Lisinopril in setting of DARLING.   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 without macular edema, without long-term current use of insulin 8/2/2017    Type 2 diabetes mellitus with diabetic polyneuropathy, without long-term current use of insulin 10/22/2015    Type 2 diabetes mellitus with stage 3 chronic kidney disease, without long-term current use of insulin 10/22/2015    Vertebral artery stenosis 3/5/2013    S/p Stenting per Dr Burnett      Past Surgical History:   Procedure Laterality Date    ARTHROSCOPY-SHOULDER WITH SUBACROMIAL DECOMPRESSION Right 7/9/2014    Performed by [facility] at [facility] OR    AV GRAFT CREATION Right 10/18/2018    [facility]  2ND FLR    Biceps Tenotomy Right 7/9/2014    [facility] at [facility] OR    BREAST SURGERY      breast reduction    COLONOSCOPY N/A 8/11/2016    Performed by Jerry Vilchis MD at [facility] ENDO (4TH FLR)    DEBRIDEMENT-SHOULDER-ARTHROSCOPIC Labral Cuff Right 7/9/2014    Performed by [facility]ja [facility] at [facility] OR    DECLOT-GRAFT Right 6/20/2019    Performed by Cabrera Irwin MD at [facility] CATH LAB    ESOPHAGOGASTRODUODENOSCOPY (EGD) N/A 12/6/2017    Performed by Dallsa Lock Jr., [facility] at [facility] ENDO    SYBTEAJQ-GTNRHOFQ-JINCFX END Right 7/9/2014    Performed by Kendall Pantoja MD at [facility] OR    Fistulogram Right 2/11/2019    Performed by Meir Valencia MD at [facility] CATH LAB    HYSTERECTOMY      ROTATOR CUFF REPAIR Right July 9, 2014    right side    Skull surgery      Aneurysm    stent placed      in vertebral artery    TOTAL REDUCTION MAMMOPLASTY      TUBAL LIGATION       Family History     Problem Relation (Age of Onset)    Bell's palsy Sister    Blindness Maternal Grandmother    Breast cancer Mother    Cancer Mother (55)    Diabetes Mother, Son,     Heart attack Mother    Heart disease Mother    Hypertension Mother, Sister    Lupus Sister    No Known Problems Daughter    Sleep apnea Sister    Stroke Sister, Maternal Aunt        Tobacco Use    Smoking status: Never Smoker    Smokeless tobacco: Never Used   Substance and Sexual Activity    Alcohol use: Not Currently     Comment: occasional wine cooler     Drug use: No    Sexual activity: Yes     Partners: Male       Objective:     Vital Signs (Most Recent):  Temp: 98.7 °F (37.1 °C) (07/08/19 0517)  Pulse: 71 (07/08/19 0721)  Resp: 16 (07/08/19 0517)  BP: (!) 114/57 (07/08/19 0517)  SpO2: 98 % (07/08/19 0517) Vital Signs (24h Range):  Temp:  [98.2 °F (36.8 °C)-99.1 °F (37.3 °C)] 98.7 °F (37.1 °C)  Pulse:  [68-88] 71  Resp:  [14-20] 16  SpO2:  [96 %-99 %] 98 %  BP: (101-158)/(54-85) 114/57     Weight: 83.8 kg (184 lb  11.9 oz)  Body mass index is 32.73 kg/m².    Physical Exam   Constitutional: She is oriented to person, place, and time. She appears well-developed and well-nourished.   HENT:   Head: Normocephalic and atraumatic.   Eyes: Pupils are equal, round, and reactive to light. EOM are normal.   Neck: Normal range of motion. Neck supple. No tracheal deviation present. No thyromegaly present.   Cardiovascular: Normal rate, regular rhythm and intact distal pulses.   No murmur heard.  Pulmonary/Chest: Effort normal. She has no wheezes.   Crackles at bases b/l R>L   Abdominal: Soft. Bowel sounds are normal. There is tenderness in the right lower quadrant. There is no rebound, no guarding and no CVA tenderness.   Musculoskeletal: Normal range of motion. She exhibits no edema or tenderness.   Lymphadenopathy:     She has no cervical adenopathy.   Neurological: She is alert and oriented to person, place, and time. She has normal reflexes. No cranial nerve deficit.   Skin: Skin is warm and dry.   CAROLE AVG with weak thrill, high pitched bruit.   Psychiatric: She has a normal mood and affect. Her behavior is normal.   Nursing note and vitals reviewed.      Significant Labs:  CBC:   Recent Labs   Lab 07/08/19  0348   WBC 7.45   RBC 2.56*   HGB 8.2*   HCT 26.9*      *   MCH 32.0*   MCHC 30.5*     CMP:   Recent Labs   Lab 07/08/19  0348   *  195*   CALCIUM 9.4  9.4   ALBUMIN 3.1*   PROT 7.0     137   K 4.9  4.9   CO2 28  28     101   BUN 36*  36*   CREATININE 5.6*  5.6*   ALKPHOS 83   ALT <5*   AST 11   BILITOT 0.5       Significant Diagnostics:  I have reviewed all pertinent imaging results/findings within the past 24 hours.    Assessment/Plan:     ESRD (end stage renal disease) on dialysis  59yo F with a history of HTN, HLD, L hemiplegia after large stroke in 2016, DM II, stage V CKD since August 2018 on HD MWF via RUE AVG. Recently s/p declot on 6/20 but with continued difficulty at  dialysis.    - Vascular US Hemodialysis Access study, Cath lab pending the result  - NPO at midnight 7/8  - Remainder of care per primary  - If continued difficulty with dialysis and urgent need from the medicine standpoint, may need central line placement for HD while inpatient   - Please call for questions or concerns        Donny Kelly MD  Vascular Surgery  [facility]

## 2019-07-08 NOTE — PROGRESS NOTES
Femoral cath arterial line pressure 300+. Line have been reversed, patient has been placed flat, placed in left and right side lying positions. Arterial pressure remains elevated. Tx has been interrupted. Nephrology notified. Instructed to use CAROLE AVG arterial side.

## 2019-07-08 NOTE — PLAN OF CARE
Problem: Adult Inpatient Plan of Care  Goal: Plan of Care Review  Outcome: Ongoing (interventions implemented as appropriate)  Plan of care reviewed with patient. Patient remains free from injury. No acute events noted at this time. AV fistula stented. Right femoral trialysis remains in place. VS stable. Will continue to monitor

## 2019-07-08 NOTE — BRIEF OP NOTE
[facility]  Brief Operative Note    SUMMARY     Surgery Date: 7/8/2019     Surgeon(s) and Role:     * WELLINGTON Palm III, MD - Primary    Assisting Surgeon: Brayden Palafox MD    Pre-op Diagnosis:   Stenosis, AVG    Post-op Diagnosis:   Same, + R innominate vein occlusion    PROCEDURES:    1. Stent placement, R AVG (9x30 Lifestent)  2. Stent placement,  R innominate vein (12 x 60 Lifestar)  3. Fistulagram  4. Conscious sedation    Anesthesia: RN IV Sedation    Description of Procedure: as above.  successful recanalization of R innominate occlusion, and Rx of recurrent venous anast/ stenosis    Description of the findings of the procedure: successful outpatient procedure    Estimated Blood Loss: <4cc         Specimens:   Specimen (12h ago, onward)    None

## 2019-07-08 NOTE — PROGRESS NOTES
Tx complete.  Tolerated. Removed 1.8L. Removed 1 needle from CAROLE AVG. Flushed venous lumen of Rt. Femoral cath.

## 2019-07-08 NOTE — NURSING
Patient refused dinner glucose check. Patient educated on the importance of monitoring glucose levels.

## 2019-07-08 NOTE — PLAN OF CARE
07/08/19 1149   Discharge Assessment   Assessment Type Discharge Planning Assessment   Confirmed/corrected address and phone number on facesheet? Yes   Assessment information obtained from? Caregiver   Expected Length of Stay (days) 2   Communicated expected length of stay with patient/caregiver yes   Prior to hospitilization cognitive status: Alert/Oriented   Prior to hospitalization functional status: Independent   Current cognitive status: Alert/Oriented   Current Functional Status: Needs Assistance   Facility Arrived From: home   Lives With alone   Able to Return to Prior Arrangements yes   Is patient able to care for self after discharge? Yes   Who are your caregiver(s) and their phone number(s)? [phone] Wilder    Patient's perception of discharge disposition admitted as an inpatient   Readmission Within the Last 30 Days no previous admission in last 30 days   Patient currently being followed by outpatient case management? No   Patient currently receives any other outside agency services? No   Equipment Currently Used at Home bedside commode;lift device;hospital bed  (Breathing machine)   Do you have any problems affording any of your prescribed medications? No   Is the patient taking medications as prescribed? yes   Does the patient have transportation home? Yes   Transportation Anticipated family or friend will provide   Does the patient receive services at the Coumadin Clinic? No   Discharge Plan A Home   Discharge Plan B Home   DME Needed Upon Discharge    (breathing machine)   Patient/Family in Agreement with Plan yes    SW called and spoke pt  to obtain updated info. Expressed that he will provide transportation upon discharge and will continue to provide care.

## 2019-07-08 NOTE — ASSESSMENT & PLAN NOTE
59yo F with a history of HTN, HLD, L hemiplegia after large stroke in 2016, DM II, stage V CKD since August 2018 on HD MWF via RUE AVG. Recently s/p declot on 6/20 but with continued difficulty at dialysis.    - Vascular US Hemodialysis Access study, Cath lab pending the result  - NPO at midnight 7/8  - Remainder of care per primary  - If continued difficulty with dialysis and urgent need from the medicine standpoint, may need central line placement for HD while inpatient   - Please call for questions or concerns

## 2019-07-08 NOTE — PLAN OF CARE
Problem: Adult Inpatient Plan of Care  Goal: Plan of Care Review  Plan of care discussed with patient. Patient is free of fall/trauma/injury. Denies CP, SOB, or pain/discomfort. For back pain administered PRN tylenol. Patient received HD last night. Plan for patient to get Right Subclavian fistula declotted today. VSS. All questions addressed. Will continue to monitor

## 2019-07-08 NOTE — PROGRESS NOTES
Arrived to patient's room. AAO x 4.  C/O nausea.  Agreed to 2 hour HD Tx.  Cathflo aspirated from Rt. Femoral lumens. Flushed with NS. UF goal 2L.  Blood flows poor. Reversed lines. Poor  Blood flows continue .

## 2019-07-08 NOTE — PROGRESS NOTES
HD resumed using graft for arterial and femoral catheter for venous return.     Used gauge 16 needle. BFR kept 300cc/min with arterial pressure at -210 to -240mmHg and venous pressure at 250- 270 mmHg

## 2019-07-08 NOTE — TELEPHONE ENCOUNTER
Dr Vasquez,  I am pt's PCP and have spoken with pt's daughter, Reina ruiz her mom's flank pain, possibly 2ndary to cholelithiasis.  I know she's not a great candidate for surgery, but would it be possible to consult General Surgery to see if she needs a cholecystectomy?  Thanks for any help, Beverly Spencer

## 2019-07-09 ENCOUNTER — TELEPHONE (OUTPATIENT)
Dept: INTERNAL MEDICINE | Facility: CLINIC | Age: 59
End: 2019-07-09

## 2019-07-09 DIAGNOSIS — Z76.82 ORGAN TRANSPLANT CANDIDATE: Primary | ICD-10-CM

## 2019-07-09 LAB
ALBUMIN SERPL BCP-MCNC: 2.9 G/DL (ref 3.5–5.2)
ALP SERPL-CCNC: 81 U/L (ref 55–135)
ALT SERPL W/O P-5'-P-CCNC: <5 U/L (ref 10–44)
ANION GAP SERPL CALC-SCNC: 14 MMOL/L (ref 8–16)
AST SERPL-CCNC: 11 U/L (ref 10–40)
BASOPHILS # BLD AUTO: 0.03 K/UL (ref 0–0.2)
BASOPHILS NFR BLD: 0.4 % (ref 0–1.9)
BILIRUB SERPL-MCNC: 0.3 MG/DL (ref 0.1–1)
BUN SERPL-MCNC: 52 MG/DL (ref 6–20)
CALCIUM SERPL-MCNC: 8.5 MG/DL (ref 8.7–10.5)
CHLORIDE SERPL-SCNC: 98 MMOL/L (ref 95–110)
CO2 SERPL-SCNC: 22 MMOL/L (ref 23–29)
CREAT SERPL-MCNC: 7.1 MG/DL (ref 0.5–1.4)
DIFFERENTIAL METHOD: ABNORMAL
EOSINOPHIL # BLD AUTO: 0.3 K/UL (ref 0–0.5)
EOSINOPHIL NFR BLD: 3.8 % (ref 0–8)
ERYTHROCYTE [DISTWIDTH] IN BLOOD BY AUTOMATED COUNT: 12.8 % (ref 11.5–14.5)
EST. GFR  (AFRICAN AMERICAN): 6.6 ML/MIN/1.73 M^2
EST. GFR  (NON AFRICAN AMERICAN): 5.7 ML/MIN/1.73 M^2
GLUCOSE SERPL-MCNC: 200 MG/DL (ref 70–110)
HCT VFR BLD AUTO: 27.5 % (ref 37–48.5)
HGB BLD-MCNC: 8.3 G/DL (ref 12–16)
IMM GRANULOCYTES # BLD AUTO: 0.04 K/UL (ref 0–0.04)
IMM GRANULOCYTES NFR BLD AUTO: 0.6 % (ref 0–0.5)
LYMPHOCYTES # BLD AUTO: 1.1 K/UL (ref 1–4.8)
LYMPHOCYTES NFR BLD: 15 % (ref 18–48)
MAGNESIUM SERPL-MCNC: 2.2 MG/DL (ref 1.6–2.6)
MCH RBC QN AUTO: 32.4 PG (ref 27–31)
MCHC RBC AUTO-ENTMCNC: 30.2 G/DL (ref 32–36)
MCV RBC AUTO: 107 FL (ref 82–98)
MONOCYTES # BLD AUTO: 0.7 K/UL (ref 0.3–1)
MONOCYTES NFR BLD: 9.9 % (ref 4–15)
NEUTROPHILS # BLD AUTO: 5 K/UL (ref 1.8–7.7)
NEUTROPHILS NFR BLD: 70.3 % (ref 38–73)
NRBC BLD-RTO: 0 /100 WBC
PHOSPHATE SERPL-MCNC: 5 MG/DL (ref 2.7–4.5)
PLATELET # BLD AUTO: 170 K/UL (ref 150–350)
PMV BLD AUTO: 10.6 FL (ref 9.2–12.9)
POCT GLUCOSE: 144 MG/DL (ref 70–110)
POCT GLUCOSE: 165 MG/DL (ref 70–110)
POCT GLUCOSE: 232 MG/DL (ref 70–110)
POCT GLUCOSE: 260 MG/DL (ref 70–110)
POTASSIUM SERPL-SCNC: 5.5 MMOL/L (ref 3.5–5.1)
PROT SERPL-MCNC: 6.5 G/DL (ref 6–8.4)
RBC # BLD AUTO: 2.56 M/UL (ref 4–5.4)
SODIUM SERPL-SCNC: 134 MMOL/L (ref 136–145)
WBC # BLD AUTO: 7.14 K/UL (ref 3.9–12.7)

## 2019-07-09 PROCEDURE — 80100016 HC MAINTENANCE HEMODIALYSIS: Mod: HCNC,NTX

## 2019-07-09 PROCEDURE — 36415 COLL VENOUS BLD VENIPUNCTURE: CPT | Mod: HCNC,NTX

## 2019-07-09 PROCEDURE — 20600001 HC STEP DOWN PRIVATE ROOM: Mod: HCNC,NTX

## 2019-07-09 PROCEDURE — 99232 SBSQ HOSP IP/OBS MODERATE 35: CPT | Mod: HCNC,NTX,, | Performed by: INTERNAL MEDICINE

## 2019-07-09 PROCEDURE — 80053 COMPREHEN METABOLIC PANEL: CPT | Mod: HCNC,NTX

## 2019-07-09 PROCEDURE — 83735 ASSAY OF MAGNESIUM: CPT | Mod: HCNC,NTX

## 2019-07-09 PROCEDURE — 90935 HEMODIALYSIS ONE EVALUATION: CPT | Mod: HCNC,NTX

## 2019-07-09 PROCEDURE — 25000003 PHARM REV CODE 250: Mod: HCNC,NTX | Performed by: NURSE PRACTITIONER

## 2019-07-09 PROCEDURE — 85025 COMPLETE CBC W/AUTO DIFF WBC: CPT | Mod: HCNC,NTX

## 2019-07-09 PROCEDURE — 25000003 PHARM REV CODE 250: Mod: HCNC,NTX | Performed by: PHYSICIAN ASSISTANT

## 2019-07-09 PROCEDURE — 25000003 PHARM REV CODE 250: Mod: HCNC,NTX | Performed by: HOSPITALIST

## 2019-07-09 PROCEDURE — 84100 ASSAY OF PHOSPHORUS: CPT | Mod: HCNC,NTX

## 2019-07-09 PROCEDURE — 99232 PR SUBSEQUENT HOSPITAL CARE,LEVL II: ICD-10-PCS | Mod: HCNC,NTX,, | Performed by: INTERNAL MEDICINE

## 2019-07-09 RX ORDER — BISACODYL 10 MG
10 SUPPOSITORY, RECTAL RECTAL ONCE
Status: COMPLETED | OUTPATIENT
Start: 2019-07-10 | End: 2019-07-09

## 2019-07-09 RX ADMIN — FUROSEMIDE 40 MG: 40 TABLET ORAL at 11:07

## 2019-07-09 RX ADMIN — FAMOTIDINE 20 MG: 20 TABLET, FILM COATED ORAL at 11:07

## 2019-07-09 RX ADMIN — SODIUM POLYSTYRENE SULFONATE 30 G: 1 POWDER ORAL; RECTAL at 03:07

## 2019-07-09 RX ADMIN — SEVELAMER CARBONATE 800 MG: 800 TABLET, FILM COATED ORAL at 11:07

## 2019-07-09 RX ADMIN — LEVETIRACETAM 500 MG: 500 TABLET, FILM COATED ORAL at 02:07

## 2019-07-09 RX ADMIN — SODIUM BICARBONATE 650 MG TABLET 1300 MG: at 11:07

## 2019-07-09 RX ADMIN — SENNOSIDES 8.6 MG: 8.6 TABLET, FILM COATED ORAL at 09:07

## 2019-07-09 RX ADMIN — INSULIN ASPART 2 UNITS: 100 INJECTION, SOLUTION INTRAVENOUS; SUBCUTANEOUS at 05:07

## 2019-07-09 RX ADMIN — FOLIC ACID 1 MG: 1 TABLET ORAL at 11:07

## 2019-07-09 RX ADMIN — SODIUM BICARBONATE 650 MG TABLET 1300 MG: at 09:07

## 2019-07-09 RX ADMIN — SODIUM CHLORIDE: 0.9 INJECTION, SOLUTION INTRAVENOUS at 07:07

## 2019-07-09 RX ADMIN — ATORVASTATIN CALCIUM 40 MG: 20 TABLET, FILM COATED ORAL at 09:07

## 2019-07-09 RX ADMIN — POLYETHYLENE GLYCOL 3350 17 G: 17 POWDER, FOR SOLUTION ORAL at 09:07

## 2019-07-09 RX ADMIN — ASPIRIN 81 MG: 81 TABLET, COATED ORAL at 06:07

## 2019-07-09 RX ADMIN — SEVELAMER CARBONATE 800 MG: 800 TABLET, FILM COATED ORAL at 05:07

## 2019-07-09 RX ADMIN — POLYETHYLENE GLYCOL 3350 17 G: 17 POWDER, FOR SOLUTION ORAL at 11:07

## 2019-07-09 RX ADMIN — BUPROPION HYDROCHLORIDE 300 MG: 150 TABLET, EXTENDED RELEASE ORAL at 11:07

## 2019-07-09 RX ADMIN — SENNOSIDES 8.6 MG: 8.6 TABLET, FILM COATED ORAL at 11:07

## 2019-07-09 RX ADMIN — INSULIN ASPART 1 UNITS: 100 INJECTION, SOLUTION INTRAVENOUS; SUBCUTANEOUS at 09:07

## 2019-07-09 RX ADMIN — LEVETIRACETAM 500 MG: 500 TABLET, FILM COATED ORAL at 09:07

## 2019-07-09 RX ADMIN — Medication 10 MG: at 11:07

## 2019-07-09 RX ADMIN — LEVETIRACETAM 500 MG: 500 TABLET, FILM COATED ORAL at 05:07

## 2019-07-09 NOTE — TELEPHONE ENCOUNTER
Dr. Hope I schedule Ms. Perez in [facility]. She is scheduled for a follow up with you on 9/3/2019. Please Advise

## 2019-07-09 NOTE — PLAN OF CARE
07/09/19 0959   Post-Acute Status   Post-Acute Authorization Home Health/Hospice   Home Health/Hospice Status Awaiting Internal Medical Clearance

## 2019-07-09 NOTE — PROGRESS NOTES
[facility] NOTE     Patient currently on hemodialysis for removal of uremic toxins and volume. Labs have been reviewed and the dialysate bath has been adjusted.     Patient seen and evaluated on hemodialysis, tolerating treatment, see HD flowsheet for vitals and assessments.      UF goal: 1-2 L as tolerated.   BFR: 400  Anemia: Hb 8.3, iron panel ordered  MBD: phos 5, continue sevelamer carbonate  Diet: renal      Assessment/Plan:    -Patient seen on HD, tolerating treatment well, w/o complaints   -Renal diet  -Strict I/O's and daily weights  -Daily renal function panels  -Will continue to follow while inpatient       Mesha Schwarz MD  Nephrology PGY-4

## 2019-07-09 NOTE — PLAN OF CARE
Vascular Surgery Progress Note:    Patient with successful dialysis session this AM via AVF, no acute issues with access.     No further vascular surgery intervention required, ok to remove femoral line from our perspective as AVF is working for dialysis.     Please call with any questions or concerns.    Shanti Mejia MD  PGY-3 General Surgery   [phone]

## 2019-07-09 NOTE — PLAN OF CARE
Problem: Adult Inpatient Plan of Care  Goal: Plan of Care Review  Outcome: Ongoing (interventions implemented as appropriate)  Plan of care reviewed with patient and she states understanding. 3.5 hours of dialysis completed this morning via AV fistula. No acute events noted at this time. Patient remains free from injury. VS stable. Glucose monitoring AC/HS continued. Right femoral trialysis remains in place. Will continue to monitor.

## 2019-07-09 NOTE — PLAN OF CARE
Problem: Adult Inpatient Plan of Care  Goal: Plan of Care Review  Outcome: Ongoing (interventions implemented as appropriate)  tx ended, 2L removed in a 3.5hr Tx, tolerated well. Blood returned via CAROLE AVF, 15g needles removed x2, gauze and tape applied, pressure held for 5mins, hemostasis achieved.

## 2019-07-09 NOTE — PLAN OF CARE
07/09/19 1555   Discharge Reassessment   Assessment Type Discharge Planning Reassessment   Do you have any problems affording any of your prescribed medications? No   Discharge Plan A Other  (out pt rehab)   Discharge Plan B Home Health  (OH[facility])   DME Needed Upon Discharge  bedside commode;shower chair;wheelchair;hospital bed;lift device;oxygen;CPAP;other (see comments)  (pt instructed not to use CPAP since aneurysm)   Anticipated Discharge Disposition   (out patient rehab evaluation at [facility] on 7/16/19)   Can the patient answer the patient profile reliably? Yes, cognitively intact   How does the patient rate their overall health at the present time? Poor   Describe the patient's ability to walk at the present time. Major restrictions/daily assistance from another person   How often would a person be available to care for the patient? Whenever needed   Number of comorbid conditions (as recorded on the chart) Five or more   Post-Acute Status   Post-Acute Authorization Other  (out pt rehab evaluation at [facility] on 7/16/19)   Home Health/Hospice Status Awaiting Internal Medical Clearance     Patient resting quietly in bed with spouse Wilder Perez ([phone]), son Wilder Perez ([phone]), & granddaughter at the bedside when CM rounded. Patient was admitted with RLQ abd pain. Patient reported that her abd pain is improving. Patient has received [facility] in the past & is scheduled to have an out patient rehab evaluation done at [facility] on Tuesday 7/16/19. Patient receives HD treatments at [facility] (MWF 1130) via RUE AVF. Plan to discharge patient home with no needs when medically stable. Message sent to Dr. Beverly Max's (PCP) requesting a hospital follow up appointment in 1-2 weeks. Dr. Max's nurse to call the patient with appointment date & time. Will continue to follow.

## 2019-07-09 NOTE — PLAN OF CARE
Problem: Adult Inpatient Plan of Care  Goal: Plan of Care Review  Outcome: Ongoing (interventions implemented as appropriate)  Plan of care reviewed with patient and spouse, No questions or concerns at this time. Pt is compliant with all requests and all medications.Remains free of falls or injuries.  remains at bedside. Will continue to monitor.

## 2019-07-09 NOTE — PROGRESS NOTES
"Hospital Medicine  Progress Note    Team: [facility]OSP MED D Brenna Parker MD  Admit Date: 7/5/2019  FAITH 7/10/2019  Length of Stay:  LOS: 3 days   Code status: Full Code    Principal Problem:  Hyperkalemia    HPI:  "59 yo F with PMH of ESRD on HD MWF, last session Wednesday (7/3/19), T2DM on insulin, sarcoidosis, CHF (last EF 60-65%, grade 2 diastolic dysfunction), RA, CVA from hemorrhage with residual L hemiplegia, and epilepsy presents with RLQ abdominal pain x1 day. Patient reports pain woke her up from sleep at 4 AM this morning. She describes it a constant, sharp pain that originally radiated to her back but has since localized to just RLQ. Pain has not worsened since onset but is worse with laying down and improved with sitting up. She has had similar episode of pain in the past (a few months ago) and was told it was gallstones. She was told to avoid fatty/spicy foods, which she reports compliance with. Patient also complaining of SOB, cough, and wheezing for past 2 days beginning after last dialysis session. She is on 2L O2 at home PRN for CHF symptoms. She endorses chronic constipation with last BM 2 days ago and described as hard (typical) without noted melena or hematochezia. Her last dialysis session was 7/3/19 without any complications, dry weight reported as 101.9 kg. She still produces small amount of urine. Of note, she had recent graft declotting procedure on 6/20/19 and was admitted to the hospital the following day for hematuria. She was discharged home with Urology follow-up. In the ED: CT abdomen with contrast remarkable for: several small layering gallstones without evidence of cholecystitis.Calcification in the expected course of the mid right ureter that is likely vascular calcification in the right ovarian vein. Small fat containing ventral wall hernia. Irregular opacities at the lung bases likely atelectasis."    Interval history: Patient with no new complaints.    Review of Systems "   Constitutional: Negative for fever.   Respiratory: Negative for shortness of breath.        Physical Exam  Physical Exam   Constitutional: No distress.   Eyes: Conjunctivae and lids are normal. Pupils are equal.   Cardiovascular: S1 normal and S2 normal.   Murmur heard.  Pulmonary/Chest: Effort normal and breath sounds normal.   Abdominal: Soft. Normal appearance and bowel sounds are normal. There is no tenderness.   Musculoskeletal:        Right upper arm: She exhibits swelling.   Neurological: She is alert. She is not disoriented.       Temp:  [96.7 °F (35.9 °C)-98.4 °F (36.9 °C)]   Pulse:  [60-81]   Resp:  [16-18]   BP: ()/(33-66)   SpO2:  [90 %-100 %]     Intake/Output Summary (Last 24 hours) at 7/9/2019 0954  Last data filed at 7/9/2019 0600  Gross per 24 hour   Intake 180 ml   Output --   Net 180 ml       Recent Labs   Lab 07/07/19  0629 07/08/19  0348 07/09/19  0334   WBC 8.35 7.45 7.14   HGB 9.5* 8.2* 8.3*   HCT 30.7* 26.9* 27.5*    188 170     Recent Labs   Lab 07/05/19  0845  07/07/19  0629  07/08/19  0348 07/08/19  1530 07/08/19  1735 07/09/19  0334      < > 134*  134*   < > 137  137 134*  --  134*   K 5.6*   < > 4.8  4.8  4.8   < > 4.9  4.9 6.2* 5.7* 5.5*   CL 94*   < > 95  95   < > 101  101 99  --  98   CO2 29   < > 26  26   < > 28  28 23  --  22*   BUN 57*   < > 44*  44*   < > 36*  36* 45*  --  52*   CREATININE 7.3*   < > 6.3*  6.3*   < > 5.6*  5.6* 6.3*  --  7.1*   *   < > 192*  192*   < > 195*  195* 258*  --  200*   CALCIUM 10.0   < > 9.1  9.1   < > 9.4  9.4 9.4  --  8.5*   MG 2.5   < > 2.3  --  2.3  --   --  2.2   PHOS 4.9*   < > 4.9*  --  4.7*  --   --  5.0*   LIPASE 64*  --   --   --   --   --   --   --     < > = values in this interval not displayed.     Recent Labs   Lab 07/07/19  0932 07/08/19  0348 07/09/19  0334   ALKPHOS 94 83 81   ALT 5* <5* <5*   AST 11 11 11   ALBUMIN 3.3* 3.1* 2.9*   PROT 7.5 7.0 6.5   BILITOT 0.5 0.5 0.3      Recent Labs    Lab 07/08/19  1845 07/08/19  1909 07/08/19  2017 07/08/19  2147 07/08/19  2347 07/09/19  0826   POCTGLUCOSE 307* 402* 237* 163* 164* 144*     Recent Labs   Lab 01/15/19  1331 04/11/19  0948   HGBA1C 8.5* 7.5*       Scheduled Meds:   sodium chloride 0.9%   Intravenous Once    aspirin  81 mg Oral QAM    atorvastatin  40 mg Oral QHS    buPROPion  300 mg Oral Daily    famotidine  20 mg Oral Daily    folic acid  1 mg Oral Daily    furosemide  40 mg Oral Daily    levETIRAcetam  500 mg Oral Q8H    polyethylene glycol  17 g Oral BID    pregabalin  25 mg Oral Every Mon, Wed, Fri    senna  8.6 mg Oral BID    sevelamer carbonate  800 mg Oral TID WM    sodium bicarbonate  1,300 mg Oral BID     Continuous Infusions:   heparin (porcine)       As Needed:  sodium chloride 0.9%, acetaminophen, albuterol, albuterol sulfate, ALPRAZolam, bisacodyl, Dextrose 10% Bolus, Dextrose 10% Bolus, Dextrose 10% Bolus, Dextrose 10% Bolus, glucagon (human recombinant), glucose, glucose, guaiFENesin, heparin (porcine), insulin aspart U-100, ondansetron, polyethylene glycol, promethazine (PHENERGAN) IVPB, ramelteon, sodium chloride 0.9%    Active Hospital Problems    Diagnosis  POA    *Hyperkalemia [E87.5]  Yes    Right lower quadrant abdominal pain [R10.31]  Yes    AV graft malfunction [T82.590A]  Yes    Hernia of abdominal wall [K43.9]  Yes    Cholelithiasis [K80.20]  Yes    ESRD (end stage renal disease) on dialysis [N18.6, Z99.2]  Not Applicable    Vitamin D deficiency [E55.9]  Yes    LEFT Hemiplegia as late effect of stroke [I69.359]  Not Applicable    Essential hypertension [I10]  Yes    Anemia in ESRD (end-stage renal disease) [N18.6, D63.1]  Yes    Controlled type 2 diabetes mellitus with chronic kidney disease on chronic dialysis, without long-term current use of insulin [E11.22, N18.6, Z99.2]  Not Applicable    Slow transit constipation [K59.01]  Yes    Partial symptomatic epilepsy with complex partial seizures,  "not intractable, without status epilepticus [G40.209]  Yes    Acute respiratory failure with hypoxia [J96.01]  Yes    Chronic diastolic CHF (congestive heart failure) [I50.32]  Yes    Sarcoidosis [D86.9]  Yes     Chronic    Hyperlipidemia [E78.5]  Yes      Resolved Hospital Problems   No resolved problems to display.       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 On Friday evening in hospital attempted HD was terminated after 30-40 min due to high pressures s/p cannulation of AVG. She had a recent 6/20 - vascular procedure for stenosis for AV graft stenosis. Vascular surgery was consulted this admission.  Due to missed HD session patient with hyperkalemia, patient had urgent evening placement of a Trialysis with partial HD session due to line clotting off. Vascular Surgery consulted.    Assessment and Plan:    Hyperkalemia/ESRD / AV graft malfunction   -Hyperkalemia s/p medical therapy, urgent HD session limited by lack of full Trialysis use.   -Nephrology consulted, Alteplase instilled to femoral catheter   -continue bicarbonate supplements  -continue sevelamer  -Vascular Surgery performed Fistulagram with Stent placement, R AVG (9x30 Lifestent) and R innominate vein (12 x 60 Lifestar).     RLQ Abdominal Pain, Cholelithiasis   Constipation-slow transit  -Suspect constipation contributing to symptoms - opiates discontinued as they will only worsen this              >no BM with additional oral medications              >give suppository if no BM, give Enema              > notes in past they have used Fleets Enema - Advised against Fleets Enema or any Enema with Phosphorus due to ESRD and this is contraindication. "   -no cholestasis by labs  -patient tolerating diet   -CT abdomen w/o acute explanation.      Acute respiratory failure with hypoxia   -Weaning parameters       High Risk Conditions  Patient has a condition that poses threat to life and bodily function: Hyperkalemia     Diet:  Diet diabetic [facility]; 2000 Calorie; Renal  GI Prophylaxis: Continued, chronic acid suppression therapy as OP  DVT Prophylaxis:     Anticoagulants   Medication Route Frequency    heparin infusion 1,000 units/500 ml in 0.9% NaCl (pressure line flush)  Continuous PRN     Lines/ Drains/ Airways: PIV  Central Line Indication: vascular access  Type: Dialysis Access  Urinary Catheter Indicated: Patient Does Not Have Urinary Catheter  Other Lines/Tubes/Drains:  Wounds:  Surgical (AVF)    Goals of Care: Routine interventions  Discharge plan:  Home or Self Care    Brenna Parker MD  Department of Hospital Medicine  [id number]

## 2019-07-09 NOTE — PROGRESS NOTES
[facility] Hospital Medicine  Progress Note    Patient Name: Lucy Perez  MRN: [id number]  Patient Class: IP- Inpatient   Admission Date: 7/5/2019  Length of Stay: 2 days  Attending Physician: Devante Vasquez MD  Primary Care Provider: Beverly Muniz MD    [facility] Medicine Team: [facility] [place] A Devante Vasquez MD    Subjective:     Principal Problem:Hyperkalemia    HPI: 59 y/o F with PMH of ESRD on HD MWF, last session Wednesday (7/3/19), T2DM on insulin, sarcoidosis, CHF (last EF 60-65%, grade 2 diastolic dysfunction), RA, 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 Patient reports pain woke her up from sleep at 4 AM this morning. She describes it a constant, sharp pain that originally radiated to her back but has since localized to just RLQ. Pain has not worsened since onset but is worse with laying down and improved with sitting up. She has had similar episode of pain in the past (a few months ago) and was told it was gallstones. She was told to avoid fatty/spicy foods, which she reports compliance with. Patient also complaining of SOB, cough, and wheezing for past 2 days beginning after last dialysis session. She is on 2L O2 at home PRN for CHF symptoms. She endorses chronic constipation with last BM 2 days ago and described as hard (typical) without noted melena or hematochezia. Her last dialysis session was 7/3/19 without any complications, dry weight reported as 101.9 kg. She still produces small amount of urine. Of note, she had recent graft declotting procedure on 6/20/19 and was admitted to the hospital the following day for hematuria. She was discharged home with urology follow-up. She denies fever/chills, N/V/D, CP, edema, diaphoresis, dizziness/lightheadedness, syncope, vision changes, dysphagia, slurred speech, focal weakness.         In the ED: O2 saturation dropped to 86%, placed on 2L NC with improvement.  Remaining vitals WNL on arrival. Afebrile without leukocytosis. Lactate 1.5.  Lipase mildly elevated at 64. CT abdomen with contrast remarkable for: several small layering gallstones without evidence of cholecystitis.Calcification in the expected course of the mid right ureter that is likely vascular calcification in the right ovarian vein. Small fat containing ventral wall hernia.Irregular opacities at the lung bases likely atelectasis. EKG with sinus rhythm, 1st degree AV block. K 5.6. BUN 57. Cr 7.3. Glucose 234. Given morphine for pain control.     Hospital Course:   Overnight admitted for concerns of hyperkalemia, she had missed HD session on FRI, prior to admission.  On Friday evening in hospital attempted HD was terminated after 30-40min due to high pressures s/p cannulation of AVG. She has a with recent 6/20 - vascular procedure for stenosis for AV graft stenosis. Vascular surgery consulted this admission and there is a plan for Monday - Fistulogram     Due to missed HD session patient with hyperkalemia, that progressed during admission, patient had urgent evening placement of trialysis with partial HD session due to line clotting off.  Hyperkalemia stabilized and will be monitored pending fistulogram    For her Abdominal pain, has a hx of cholelithiasis, labs not consistent with cholestasis, CT imaging w/o cholecystitis, biliary ductal dilatation, appendicitis, or pancreatitis.  PRN pain medications w/o much improvement in her symptoms.  CT scan does have diffuse stool through entire colon, starting laxatives concern constipation may be contributing to her symptoms.       Interval History:    receved HD overnight  Pptassium this AM stable, this afternoon rising - repeat pending - orders for medical therapy placed.     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  Reported successful procedure.   Will await repeat use  by nephrology prior to removing femoral trialysis line.     NO BM< advisd RN to give Suppository, will need enema if still not working, no BM x 1 week.     Review of Systems   Constitutional: Positive for fatigue.   HENT: Negative for sinus pressure and sore throat.    Eyes: Negative for visual disturbance.   Respiratory: Negative for cough, chest tightness and shortness of breath.    Cardiovascular: Negative for chest pain and leg swelling.   Gastrointestinal: Positive for abdominal pain. Negative for constipation, diarrhea, nausea and vomiting.   Genitourinary: Negative for dysuria.   Musculoskeletal: Negative for back pain.   Neurological: Negative for dizziness.   Psychiatric/Behavioral: Negative for confusion.     Objective:     Vital Signs (Most Recent):  Temp: 98.1 °F (36.7 °C) (07/08/19 2016)  Pulse: 81 (07/08/19 2016)  Resp: 16 (07/08/19 2016)  BP: (!) 97/55 (07/08/19 2016)  SpO2: (!) 93 % (07/08/19 2016) Vital Signs (24h Range):  Temp:  [97.6 °F (36.4 °C)-98.8 °F (37.1 °C)] 98.1 °F (36.7 °C)  Pulse:  [68-82] 81  Resp:  [16-18] 16  SpO2:  [90 %-100 %] 93 %  BP: ()/(55-85) 97/55     Weight: 83.9 kg (184 lb 15.5 oz)  Body mass index is 32.77 kg/m².    Intake/Output Summary (Last 24 hours) at 7/8/2019 2104  Last data filed at 7/7/2019 2134  Gross per 24 hour   Intake 150 ml   Output 1869 ml   Net -1719 ml      Physical Exam   Constitutional: No distress.   Eyes: Pupils are equal, round, and reactive to light. No scleral icterus.   Cardiovascular: Normal rate and regular rhythm.   Murmur heard.  Pulmonary/Chest: Effort normal. No stridor. She has no wheezes.   Decreased breath sounds at bases   Abdominal: Soft. Bowel sounds are normal.   Tender to palpation in RLQ, no rebound or guarding noted   Musculoskeletal:   Right Femoral Trialysis CVC   Lymphadenopathy:     She has no cervical adenopathy.   Neurological: GCS eye subscore is 4. GCS verbal subscore is 5. GCS motor subscore is 6.   Skin: Skin is warm  and dry.       Significant Labs:   CBC:   Recent Labs   Lab 07/07/19  0629 07/08/19  0348   WBC 8.35 7.45   HGB 9.5* 8.2*   HCT 30.7* 26.9*    188     CMP:   Recent Labs   Lab 07/07/19  0629 07/07/19  0932  07/07/19  2254 07/08/19  0348 07/08/19  1530 07/08/19  1735   *  134* 136   < > 138 137  137 134*  --    K 4.8  4.8  4.8 5.0   < > 4.5 4.9  4.9 6.2* 5.7*   CL 95  95 94*   < > 100 101  101 99  --    CO2 26  26 29   < > 25 28  28 23  --    *  192* 200*   < > 239* 195*  195* 258*  --    BUN 44*  44* 49*   < > 29* 36*  36* 45*  --    CREATININE 6.3*  6.3* 6.8*   < > 4.7* 5.6*  5.6* 6.3*  --    CALCIUM 9.1  9.1 9.2   < > 9.8 9.4  9.4 9.4  --    PROT  --  7.5  --   --  7.0  --   --    ALBUMIN 3.1* 3.3*  --   --  3.1*  --   --    BILITOT  --  0.5  --   --  0.5  --   --    ALKPHOS  --  94  --   --  83  --   --    AST  --  11  --   --  11  --   --    ALT  --  5*  --   --  <5*  --   --    ANIONGAP 13  13 13   < > 13 8  8 12  --    EGFRNONAA 6.6*  6.6* 6.0*   < > 9.5* 7.6*  7.6* 6.6*  --     < > = values in this interval not displayed.       Significant Imaging: I have reviewed all pertinent imaging results/findings within the past 24 hours.    Assessment/Plan:      Hyperkalemia/ESRD / AV graft malfunction   -Hyperkalemia s/p medical therapy, urgent HD session complicated by lack of full trialysis use.   K 4.8 this AM, keep trending   -Nephrology consults following, Alteplase instilled to femoral catheter yesterday  -continue bicarbonate supplements  -continue sevelamer      RLQ Abdominal Pain, Cholelithiasis   Constipation-slow transit  -Suspect constipation is contributing - stopping opiates as they will only worsen this   >no BM with additional oral medications   >give suppository if no BM, give Enema   > notes in past they have used Fleets Enema - Advised against Fleets Enema or any Enema with Phosphorus in it as pt is ESRD and this is contraindication.     -no  cholestasis by labs  -patient tolerating diet   -CT abdomen w/o acute explanation.       Acute respiratory failure with hypoxia   -Weaning parameter order placed.         Active Hospital Problems    Diagnosis  POA    *Hyperkalemia [E87.5]  Yes     Priority: 1 - High    Right lower quadrant abdominal pain [R10.31]  Yes     Priority: 3     AV graft malfunction [T82.590A]  Yes     Priority: 3     Cholelithiasis [K80.20]  Yes     Priority: 3     ESRD (end stage renal disease) on dialysis [N18.6, Z99.2]  Not Applicable     Priority: 3     Acute respiratory failure with hypoxia [J96.01]  Yes     Priority: 3     Hernia of abdominal wall [K43.9]  Yes     Priority: 4     Chronic diastolic CHF (congestive heart failure) [I50.32]  Yes     Priority: 4     Partial symptomatic epilepsy with complex partial seizures, not intractable, without status epilepticus [G40.209]  Yes     Priority: 5     Sarcoidosis [D86.9]  Yes     Priority: 6      Chronic    Anemia in ESRD (end-stage renal disease) [N18.6, D63.1]  Yes     Priority: 7     Hyperlipidemia [E78.5]  Yes     Priority: 8     Controlled type 2 diabetes mellitus with chronic kidney disease on chronic dialysis, without long-term current use of insulin [E11.22, N18.6, Z99.2]  Not Applicable     Priority: 9     Essential hypertension [I10]  Yes     Priority: 10     LEFT Hemiplegia as late effect of stroke [I69.359]  Not Applicable     Priority: 11     Vitamin D deficiency [E55.9]  Yes     Priority: 12     Slow transit constipation [K59.01]  Yes      Resolved Hospital Problems   No resolved problems to display.       VTE Risk Mitigation (From admission, onward)        Ordered     heparin infusion 1,000 units/500 ml in 0.9% NaCl (pressure line flush)  Continuous PRN      07/08/19 1012     Place sequential compression device  Until discontinued      07/05/19 1350     IP VTE HIGH RISK PATIENT  Once      07/05/19 1350             Devante Vasquez MD  Department of  Hospital Medicine [facility]

## 2019-07-10 LAB
ALBUMIN SERPL BCP-MCNC: 2.9 G/DL (ref 3.5–5.2)
ALP SERPL-CCNC: 77 U/L (ref 55–135)
ALT SERPL W/O P-5'-P-CCNC: <5 U/L (ref 10–44)
ANION GAP SERPL CALC-SCNC: 13 MMOL/L (ref 8–16)
AST SERPL-CCNC: 10 U/L (ref 10–40)
BASOPHILS # BLD AUTO: 0.04 K/UL (ref 0–0.2)
BASOPHILS NFR BLD: 0.5 % (ref 0–1.9)
BILIRUB SERPL-MCNC: 0.4 MG/DL (ref 0.1–1)
BUN SERPL-MCNC: 41 MG/DL (ref 6–20)
CALCIUM SERPL-MCNC: 8.5 MG/DL (ref 8.7–10.5)
CHLORIDE SERPL-SCNC: 100 MMOL/L (ref 95–110)
CO2 SERPL-SCNC: 26 MMOL/L (ref 23–29)
CREAT SERPL-MCNC: 6.3 MG/DL (ref 0.5–1.4)
DIFFERENTIAL METHOD: ABNORMAL
EOSINOPHIL # BLD AUTO: 0.2 K/UL (ref 0–0.5)
EOSINOPHIL NFR BLD: 2.9 % (ref 0–8)
ERYTHROCYTE [DISTWIDTH] IN BLOOD BY AUTOMATED COUNT: 13 % (ref 11.5–14.5)
EST. GFR  (AFRICAN AMERICAN): 7.6 ML/MIN/1.73 M^2
EST. GFR  (NON AFRICAN AMERICAN): 6.6 ML/MIN/1.73 M^2
FERRITIN SERPL-MCNC: 1038 NG/ML (ref 20–300)
GLUCOSE SERPL-MCNC: 157 MG/DL (ref 70–110)
HCT VFR BLD AUTO: 28.6 % (ref 37–48.5)
HGB BLD-MCNC: 8.8 G/DL (ref 12–16)
IMM GRANULOCYTES # BLD AUTO: 0.04 K/UL (ref 0–0.04)
IMM GRANULOCYTES NFR BLD AUTO: 0.5 % (ref 0–0.5)
IRON SERPL-MCNC: 41 UG/DL (ref 30–160)
LYMPHOCYTES # BLD AUTO: 1 K/UL (ref 1–4.8)
LYMPHOCYTES NFR BLD: 12.9 % (ref 18–48)
MAGNESIUM SERPL-MCNC: 2.2 MG/DL (ref 1.6–2.6)
MCH RBC QN AUTO: 32.2 PG (ref 27–31)
MCHC RBC AUTO-ENTMCNC: 30.8 G/DL (ref 32–36)
MCV RBC AUTO: 105 FL (ref 82–98)
MONOCYTES # BLD AUTO: 0.7 K/UL (ref 0.3–1)
MONOCYTES NFR BLD: 9.2 % (ref 4–15)
NEUTROPHILS # BLD AUTO: 5.7 K/UL (ref 1.8–7.7)
NEUTROPHILS NFR BLD: 74 % (ref 38–73)
NRBC BLD-RTO: 0 /100 WBC
PHOSPHATE SERPL-MCNC: 4.4 MG/DL (ref 2.7–4.5)
PLATELET # BLD AUTO: 195 K/UL (ref 150–350)
PMV BLD AUTO: 10.4 FL (ref 9.2–12.9)
POCT GLUCOSE: 177 MG/DL (ref 70–110)
POCT GLUCOSE: 189 MG/DL (ref 70–110)
POCT GLUCOSE: 276 MG/DL (ref 70–110)
POTASSIUM SERPL-SCNC: 4.4 MMOL/L (ref 3.5–5.1)
PROT SERPL-MCNC: 6.5 G/DL (ref 6–8.4)
RBC # BLD AUTO: 2.73 M/UL (ref 4–5.4)
SATURATED IRON: 24 % (ref 20–50)
SODIUM SERPL-SCNC: 139 MMOL/L (ref 136–145)
TOTAL IRON BINDING CAPACITY: 172 UG/DL (ref 250–450)
TRANSFERRIN SERPL-MCNC: 116 MG/DL (ref 200–375)
WBC # BLD AUTO: 7.65 K/UL (ref 3.9–12.7)

## 2019-07-10 PROCEDURE — 25000003 PHARM REV CODE 250: Mod: HCNC,NTX | Performed by: INTERNAL MEDICINE

## 2019-07-10 PROCEDURE — 36415 COLL VENOUS BLD VENIPUNCTURE: CPT | Mod: HCNC,NTX

## 2019-07-10 PROCEDURE — 83540 ASSAY OF IRON: CPT | Mod: HCNC,NTX

## 2019-07-10 PROCEDURE — 20600001 HC STEP DOWN PRIVATE ROOM: Mod: HCNC,NTX

## 2019-07-10 PROCEDURE — 25000003 PHARM REV CODE 250: Mod: HCNC,NTX | Performed by: PHYSICIAN ASSISTANT

## 2019-07-10 PROCEDURE — 82728 ASSAY OF FERRITIN: CPT | Mod: HCNC,NTX

## 2019-07-10 PROCEDURE — 85025 COMPLETE CBC W/AUTO DIFF WBC: CPT | Mod: HCNC,NTX

## 2019-07-10 PROCEDURE — 99232 SBSQ HOSP IP/OBS MODERATE 35: CPT | Mod: HCNC,NTX,, | Performed by: INTERNAL MEDICINE

## 2019-07-10 PROCEDURE — 25000003 PHARM REV CODE 250: Mod: HCNC,NTX | Performed by: HOSPITALIST

## 2019-07-10 PROCEDURE — 99232 PR SUBSEQUENT HOSPITAL CARE,LEVL II: ICD-10-PCS | Mod: HCNC,NTX,, | Performed by: INTERNAL MEDICINE

## 2019-07-10 PROCEDURE — 83735 ASSAY OF MAGNESIUM: CPT | Mod: HCNC,NTX

## 2019-07-10 PROCEDURE — 84100 ASSAY OF PHOSPHORUS: CPT | Mod: HCNC,NTX

## 2019-07-10 PROCEDURE — 80053 COMPREHEN METABOLIC PANEL: CPT | Mod: HCNC,NTX

## 2019-07-10 RX ORDER — SODIUM CHLORIDE 0.9 % (FLUSH) 0.9 %
10 SYRINGE (ML) INJECTION
Status: DISCONTINUED | OUTPATIENT
Start: 2019-07-10 | End: 2019-07-11 | Stop reason: HOSPADM

## 2019-07-10 RX ORDER — AMOXICILLIN 250 MG
1 CAPSULE ORAL 2 TIMES DAILY
Status: DISCONTINUED | OUTPATIENT
Start: 2019-07-10 | End: 2019-07-11 | Stop reason: HOSPADM

## 2019-07-10 RX ORDER — SODIUM CHLORIDE 9 MG/ML
INJECTION, SOLUTION INTRAVENOUS ONCE
Status: COMPLETED | OUTPATIENT
Start: 2019-07-11 | End: 2019-07-11

## 2019-07-10 RX ADMIN — SODIUM BICARBONATE 650 MG TABLET 1300 MG: at 09:07

## 2019-07-10 RX ADMIN — FAMOTIDINE 20 MG: 20 TABLET, FILM COATED ORAL at 10:07

## 2019-07-10 RX ADMIN — SEVELAMER CARBONATE 800 MG: 800 TABLET, FILM COATED ORAL at 04:07

## 2019-07-10 RX ADMIN — LEVETIRACETAM 500 MG: 500 TABLET, FILM COATED ORAL at 08:07

## 2019-07-10 RX ADMIN — ASPIRIN 81 MG: 81 TABLET, COATED ORAL at 05:07

## 2019-07-10 RX ADMIN — ACETAMINOPHEN 650 MG: 325 TABLET ORAL at 04:07

## 2019-07-10 RX ADMIN — FOLIC ACID 1 MG: 1 TABLET ORAL at 09:07

## 2019-07-10 RX ADMIN — LEVETIRACETAM 500 MG: 500 TABLET, FILM COATED ORAL at 01:07

## 2019-07-10 RX ADMIN — INSULIN ASPART 3 UNITS: 100 INJECTION, SOLUTION INTRAVENOUS; SUBCUTANEOUS at 11:07

## 2019-07-10 RX ADMIN — FUROSEMIDE 40 MG: 40 TABLET ORAL at 10:07

## 2019-07-10 RX ADMIN — ATORVASTATIN CALCIUM 40 MG: 20 TABLET, FILM COATED ORAL at 08:07

## 2019-07-10 RX ADMIN — SENNOSIDES AND DOCUSATE SODIUM 1 TABLET: 8.6; 5 TABLET ORAL at 08:07

## 2019-07-10 RX ADMIN — SODIUM BICARBONATE 650 MG TABLET 1300 MG: at 08:07

## 2019-07-10 RX ADMIN — SENNOSIDES AND DOCUSATE SODIUM 1 TABLET: 8.6; 5 TABLET ORAL at 09:07

## 2019-07-10 RX ADMIN — POLYETHYLENE GLYCOL 3350 17 G: 17 POWDER, FOR SOLUTION ORAL at 10:07

## 2019-07-10 RX ADMIN — SEVELAMER CARBONATE 800 MG: 800 TABLET, FILM COATED ORAL at 11:07

## 2019-07-10 RX ADMIN — SEVELAMER CARBONATE 800 MG: 800 TABLET, FILM COATED ORAL at 08:07

## 2019-07-10 RX ADMIN — PREGABALIN 25 MG: 25 CAPSULE ORAL at 09:07

## 2019-07-10 RX ADMIN — POLYETHYLENE GLYCOL 3350 17 G: 17 POWDER, FOR SOLUTION ORAL at 08:07

## 2019-07-10 RX ADMIN — LEVETIRACETAM 500 MG: 500 TABLET, FILM COATED ORAL at 05:07

## 2019-07-10 RX ADMIN — BUPROPION HYDROCHLORIDE 300 MG: 150 TABLET, EXTENDED RELEASE ORAL at 09:07

## 2019-07-10 NOTE — PLAN OF CARE
Problem: Adult Inpatient Plan of Care  Goal: Plan of Care Review  Outcome: Ongoing (interventions implemented as appropriate)  Pt remains free of falls or injuries. Last BM 7/3/19, provider on call notified. Med administered as ordered. Plan of care reviewed with pt and family, no questions voiced at this time. Pt is compliant with all requests and meds. Will continue to monitor.

## 2019-07-10 NOTE — PLAN OF CARE
Problem: Adult Inpatient Plan of Care  Goal: Plan of Care Review  Outcome: Ongoing (interventions implemented as appropriate)  Pt. AAO x 4, afebrile,Tele in place SR HR 80s-90s, spouse at the bedside- interacting with the patient and participating in care.  Femoral trialysis pulled per MD-- pressure dressing applied to site, instructed patient to remain flat for 30 minutes  , 276, 189--sliding scale given per orders  Pt. Up to BSC-- passing gas and small BM this shift; produced a small amount of urine per family  Plans for tap water enema--patient and her spouse did not want to do it at this time until femoral site was pulled and closed  C/o of generalized back pain- pt. States it is due to the bed--Tylenol and heat packs applied with some relief  Cream applied to buttock, pt. Position shifted q2h per family and this nurse  Plans for dialysis tomorrow  Safety precautions maintained throughout the shift, call light within reach, and nonslip socks when out of bed.

## 2019-07-10 NOTE — PROGRESS NOTES
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 Patient reports pain woke her up from sleep at 4 AM this morning. She describes it a constant, sharp pain that originally radiated to her back but has since localized to just RLQ. Pain has not worsened since onset but is worse with laying down and improved with sitting up. She has had similar episode of pain in the past (a few months ago) and was told it was gallstones. She was told to avoid fatty/spicy foods, which she reports compliance with. Patient also complaining of SOB, cough, and wheezing for past 2 days beginning after last dialysis session. She is on 2L O2 at home PRN for CHF symptoms. She endorses chronic constipation with last BM 2 days ago and described as hard (typical) without noted melena or hematochezia. Her last dialysis session was 7/3/19 without any complications, dry weight reported as 101.9 kg. She still produces small amount of urine. Of note, she had recent graft declotting procedure on 6/20/19 and was admitted to the hospital the following day for hematuria. She was discharged home with Urology follow-up. In the ED: CT abdomen with contrast remarkable for: several small layering gallstones without evidence of cholecystitis.Calcification in the expected course of the mid right ureter that is likely vascular calcification in the right ovarian vein. Small fat containing ventral wall hernia. Irregular opacities at the lung bases likely atelectasis."    Interval history: Patient with no new complaints.    Review of Systems "   Constitutional: Negative for fever.   Respiratory: Negative for shortness of breath.        Physical Exam  Physical Exam   Constitutional: No distress.   Eyes: Conjunctivae and lids are normal. Pupils are equal.   Cardiovascular: S1 normal and S2 normal.   Murmur heard.  Pulmonary/Chest: Effort normal and breath sounds normal.   Abdominal: Soft. Normal appearance and bowel sounds are normal. There is no tenderness.   Neurological: She is alert. She is not disoriented.       Temp:  [97.3 °F (36.3 °C)-99.4 °F (37.4 °C)]   Pulse:  [67-92]   Resp:  [16-18]   BP: ()/(33-74)   SpO2:  [90 %-99 %]     Intake/Output Summary (Last 24 hours) at 7/10/2019 0929  Last data filed at 7/9/2019 1400  Gross per 24 hour   Intake 1010 ml   Output 2460 ml   Net -1450 ml       Recent Labs   Lab 07/08/19  0348 07/09/19  0334 07/10/19  0742   WBC 7.45 7.14 7.65   HGB 8.2* 8.3* 8.8*   HCT 26.9* 27.5* 28.6*    170 195     Recent Labs   Lab 07/05/19  0845  07/08/19  0348 07/08/19  1530 07/08/19  1735 07/09/19  0334 07/10/19  0526      < > 137  137 134*  --  134* 139   K 5.6*   < > 4.9  4.9 6.2* 5.7* 5.5* 4.4   CL 94*   < > 101  101 99  --  98 100   CO2 29   < > 28  28 23  --  22* 26   BUN 57*   < > 36*  36* 45*  --  52* 41*   CREATININE 7.3*   < > 5.6*  5.6* 6.3*  --  7.1* 6.3*   *   < > 195*  195* 258*  --  200* 157*   CALCIUM 10.0   < > 9.4  9.4 9.4  --  8.5* 8.5*   MG 2.5   < > 2.3  --   --  2.2 2.2   PHOS 4.9*   < > 4.7*  --   --  5.0* 4.4   LIPASE 64*  --   --   --   --   --   --     < > = values in this interval not displayed.     Recent Labs   Lab 07/08/19  0348 07/09/19  0334 07/10/19  0526   ALKPHOS 83 81 77   ALT <5* <5* <5*   AST 11 11 10   ALBUMIN 3.1* 2.9* 2.9*   PROT 7.0 6.5 6.5   BILITOT 0.5 0.3 0.4      Recent Labs   Lab 07/08/19  2347 07/09/19  0826 07/09/19  1050 07/09/19  1704 07/09/19  2108 07/10/19  0805   POCTGLUCOSE 164* 144* 165* 232* 260* 177*     Recent Labs   Lab 01/15/19  1331  04/11/19  0948   HGBA1C 8.5* 7.5*       Scheduled Meds:   [START ON 7/11/2019] sodium chloride 0.9%   Intravenous Once    aspirin  81 mg Oral QAM    atorvastatin  40 mg Oral QHS    buPROPion  300 mg Oral Daily    famotidine  20 mg Oral Daily    folic acid  1 mg Oral Daily    furosemide  40 mg Oral Daily    levETIRAcetam  500 mg Oral Q8H    polyethylene glycol  17 g Oral BID    pregabalin  25 mg Oral Every Mon, Wed, Fri    senna-docusate 8.6-50 mg  1 tablet Oral BID    sevelamer carbonate  800 mg Oral TID WM    sodium bicarbonate  1,300 mg Oral BID     Continuous Infusions:   heparin (porcine)       As Needed:  sodium chloride 0.9%, acetaminophen, albuterol, albuterol sulfate, ALPRAZolam, bisacodyl, Dextrose 10% Bolus, Dextrose 10% Bolus, Dextrose 10% Bolus, Dextrose 10% Bolus, glucagon (human recombinant), glucose, glucose, guaiFENesin, heparin (porcine), insulin aspart U-100, ondansetron, polyethylene glycol, ramelteon, sodium chloride 0.9%, sodium chloride 0.9%    Active Hospital Problems    Diagnosis  POA    *Hyperkalemia [E87.5]  Yes    Right lower quadrant abdominal pain [R10.31]  Yes    AV graft malfunction [T82.590A]  Yes    Hernia of abdominal wall [K43.9]  Yes    Cholelithiasis [K80.20]  Yes    ESRD (end stage renal disease) on dialysis [N18.6, Z99.2]  Not Applicable    Vitamin D deficiency [E55.9]  Yes    LEFT Hemiplegia as late effect of stroke [I69.359]  Not Applicable    Essential hypertension [I10]  Yes    Anemia in ESRD (end-stage renal disease) [N18.6, D63.1]  Yes    Controlled type 2 diabetes mellitus with chronic kidney disease on chronic dialysis, without long-term current use of insulin [E11.22, N18.6, Z99.2]  Not Applicable    Slow transit constipation [K59.01]  Yes    Partial symptomatic epilepsy with complex partial seizures, not intractable, without status epilepticus [G40.209]  Yes    Acute respiratory failure with hypoxia [J96.01]  Yes    Chronic diastolic CHF  "(congestive heart failure) [I50.32]  Yes    Sarcoidosis [D86.9]  Yes     Chronic    Hyperlipidemia [E78.5]  Yes      Resolved Hospital Problems   No resolved problems to display.       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 On Friday evening in hospital attempted HD was terminated after 30-40 min due to high pressures s/p cannulation of AVG. She had a recent 6/20 - vascular procedure for stenosis for AV graft stenosis. Vascular surgery was consulted this admission.  Due to missed HD session patient with hyperkalemia, patient had urgent evening placement of a Trialysis with partial HD session due to line clotting off. Vascular Surgery consulted."    Assessment and Plan:    Hyperkalemia/ESRD / AV graft malfunction   -Hyperkalemia s/p medical therapy, urgent HD session limited by lack of full Trialysis use.   -Nephrology consulted, Alteplase instilled to femoral catheter   -continue bicarbonate supplements  -continue sevelamer  -Vascular Surgery performed Fistulagram with Stent placement, R AVG (9x30 Lifestent) and R innominate vein (12 x 60 Lifestar).  -Stable removal of temporary HD access on 7/10; HD in AM     RLQ Abdominal Pain, Cholelithiasis   Constipation-slow transit  -Suspect constipation contributing to symptoms - opiates discontinued as they will only worsen this              >no BM with additional oral medications              >give suppository if no BM, give Enema              > notes in past they have used Fleets Enema - Advised against Fleets Enema or any Enema with Phosphorus due to ESRD and this is contraindication.   -no cholestasis by labs  -patient tolerating diet   -CT abdomen w/o acute " explanation.   -some response to laxatives, monitoring     Acute respiratory failure with hypoxia   -Weaning parameters       High Risk Conditions  Patient has a condition that poses threat to life and bodily function: Hyperkalemia     Diet:  Diet diabetic [facility]; 2000 Calorie; Renal  GI Prophylaxis: Continued, chronic acid suppression therapy as OP  DVT Prophylaxis:     Anticoagulants   Medication Route Frequency    heparin infusion 1,000 units/500 ml in 0.9% NaCl (pressure line flush)  Continuous PRN     Lines/ Drains/ Airways: PIV  Central Line Indication: vascular access  Type: Dialysis Access  Urinary Catheter Indicated: Patient Does Not Have Urinary Catheter  Other Lines/Tubes/Drains:  Wounds:  Surgical (AVF)    Goals of Care: Routine interventions  Discharge plan:  Home or Self Care    Brenna Parker MD  Department of Hospital Medicine  [id number]

## 2019-07-10 NOTE — NURSING
Spoke to Osmar Kirby NP,notified him that pt has not had BM since 7/3/19, despite numerous meds administered. Will continue to monitor.

## 2019-07-11 ENCOUNTER — TELEPHONE (OUTPATIENT)
Dept: INTERNAL MEDICINE | Facility: CLINIC | Age: 59
End: 2019-07-11

## 2019-07-11 VITALS
TEMPERATURE: 98 F | RESPIRATION RATE: 18 BRPM | WEIGHT: 184.94 LBS | HEART RATE: 78 BPM | HEIGHT: 63 IN | SYSTOLIC BLOOD PRESSURE: 119 MMHG | DIASTOLIC BLOOD PRESSURE: 62 MMHG | BODY MASS INDEX: 32.77 KG/M2 | OXYGEN SATURATION: 95 %

## 2019-07-11 PROBLEM — N18.6 TYPE 2 DIABETES MELLITUS WITH CHRONIC KIDNEY DISEASE ON CHRONIC DIALYSIS, WITHOUT LONG-TERM CURRENT USE OF INSULIN: Status: ACTIVE | Noted: 2018-08-10

## 2019-07-11 PROBLEM — T82.590A AV GRAFT MALFUNCTION: Status: RESOLVED | Noted: 2019-07-06 | Resolved: 2019-07-11

## 2019-07-11 PROBLEM — R10.31 RIGHT LOWER QUADRANT ABDOMINAL PAIN: Status: RESOLVED | Noted: 2019-07-06 | Resolved: 2019-07-11

## 2019-07-11 PROBLEM — Z99.2 TYPE 2 DIABETES MELLITUS WITH CHRONIC KIDNEY DISEASE ON CHRONIC DIALYSIS, WITHOUT LONG-TERM CURRENT USE OF INSULIN: Status: ACTIVE | Noted: 2018-08-10

## 2019-07-11 PROBLEM — E87.5 HYPERKALEMIA: Status: RESOLVED | Noted: 2019-06-21 | Resolved: 2019-07-11

## 2019-07-11 PROBLEM — E11.22 TYPE 2 DIABETES MELLITUS WITH CHRONIC KIDNEY DISEASE ON CHRONIC DIALYSIS, WITHOUT LONG-TERM CURRENT USE OF INSULIN: Status: ACTIVE | Noted: 2018-08-10

## 2019-07-11 LAB
ALBUMIN SERPL BCP-MCNC: 3 G/DL (ref 3.5–5.2)
ALP SERPL-CCNC: 79 U/L (ref 55–135)
ALT SERPL W/O P-5'-P-CCNC: <5 U/L (ref 10–44)
ANION GAP SERPL CALC-SCNC: 13 MMOL/L (ref 8–16)
AST SERPL-CCNC: 7 U/L (ref 10–40)
BASOPHILS # BLD AUTO: 0.03 K/UL (ref 0–0.2)
BASOPHILS NFR BLD: 0.5 % (ref 0–1.9)
BILIRUB SERPL-MCNC: 0.3 MG/DL (ref 0.1–1)
BUN SERPL-MCNC: 58 MG/DL (ref 6–20)
CALCIUM SERPL-MCNC: 9 MG/DL (ref 8.7–10.5)
CHLORIDE SERPL-SCNC: 96 MMOL/L (ref 95–110)
CO2 SERPL-SCNC: 28 MMOL/L (ref 23–29)
CREAT SERPL-MCNC: 7.7 MG/DL (ref 0.5–1.4)
DIFFERENTIAL METHOD: ABNORMAL
EOSINOPHIL # BLD AUTO: 0.2 K/UL (ref 0–0.5)
EOSINOPHIL NFR BLD: 3.7 % (ref 0–8)
ERYTHROCYTE [DISTWIDTH] IN BLOOD BY AUTOMATED COUNT: 12.9 % (ref 11.5–14.5)
EST. GFR  (AFRICAN AMERICAN): 6 ML/MIN/1.73 M^2
EST. GFR  (NON AFRICAN AMERICAN): 5.2 ML/MIN/1.73 M^2
ESTIMATED AVG GLUCOSE: 180 MG/DL (ref 68–131)
GLUCOSE SERPL-MCNC: 242 MG/DL (ref 70–110)
HBA1C MFR BLD HPLC: 7.9 % (ref 4–5.6)
HBV SURFACE AG SERPL QL IA: NEGATIVE
HCT VFR BLD AUTO: 26.1 % (ref 37–48.5)
HGB BLD-MCNC: 8 G/DL (ref 12–16)
IMM GRANULOCYTES # BLD AUTO: 0.03 K/UL (ref 0–0.04)
IMM GRANULOCYTES NFR BLD AUTO: 0.5 % (ref 0–0.5)
LYMPHOCYTES # BLD AUTO: 1.2 K/UL (ref 1–4.8)
LYMPHOCYTES NFR BLD: 20.6 % (ref 18–48)
MAGNESIUM SERPL-MCNC: 2.4 MG/DL (ref 1.6–2.6)
MCH RBC QN AUTO: 32 PG (ref 27–31)
MCHC RBC AUTO-ENTMCNC: 30.7 G/DL (ref 32–36)
MCV RBC AUTO: 104 FL (ref 82–98)
MONOCYTES # BLD AUTO: 0.5 K/UL (ref 0.3–1)
MONOCYTES NFR BLD: 8.9 % (ref 4–15)
NEUTROPHILS # BLD AUTO: 3.8 K/UL (ref 1.8–7.7)
NEUTROPHILS NFR BLD: 65.8 % (ref 38–73)
NRBC BLD-RTO: 0 /100 WBC
PHOSPHATE SERPL-MCNC: 4.6 MG/DL (ref 2.7–4.5)
PLATELET # BLD AUTO: 198 K/UL (ref 150–350)
PMV BLD AUTO: 10.4 FL (ref 9.2–12.9)
POCT GLUCOSE: 145 MG/DL (ref 70–110)
POCT GLUCOSE: 159 MG/DL (ref 70–110)
POCT GLUCOSE: 215 MG/DL (ref 70–110)
POTASSIUM SERPL-SCNC: 4.1 MMOL/L (ref 3.5–5.1)
PROT SERPL-MCNC: 6.8 G/DL (ref 6–8.4)
RBC # BLD AUTO: 2.5 M/UL (ref 4–5.4)
SODIUM SERPL-SCNC: 137 MMOL/L (ref 136–145)
WBC # BLD AUTO: 5.72 K/UL (ref 3.9–12.7)

## 2019-07-11 PROCEDURE — 99239 PR HOSPITAL DISCHARGE DAY,>30 MIN: ICD-10-PCS | Mod: HCNC,NTX,, | Performed by: INTERNAL MEDICINE

## 2019-07-11 PROCEDURE — 87340 HEPATITIS B SURFACE AG IA: CPT | Mod: HCNC,NTX

## 2019-07-11 PROCEDURE — 25000003 PHARM REV CODE 250: Mod: HCNC,NTX | Performed by: HOSPITALIST

## 2019-07-11 PROCEDURE — 36415 COLL VENOUS BLD VENIPUNCTURE: CPT | Mod: HCNC,NTX

## 2019-07-11 PROCEDURE — 90935 HEMODIALYSIS ONE EVALUATION: CPT | Mod: HCNC,NTX

## 2019-07-11 PROCEDURE — 99239 HOSP IP/OBS DSCHRG MGMT >30: CPT | Mod: HCNC,NTX,, | Performed by: INTERNAL MEDICINE

## 2019-07-11 PROCEDURE — 83036 HEMOGLOBIN GLYCOSYLATED A1C: CPT | Mod: HCNC,NTX

## 2019-07-11 PROCEDURE — 25000003 PHARM REV CODE 250: Mod: HCNC,NTX | Performed by: NURSE PRACTITIONER

## 2019-07-11 PROCEDURE — 25000003 PHARM REV CODE 250: Mod: HCNC,NTX | Performed by: PHYSICIAN ASSISTANT

## 2019-07-11 PROCEDURE — 85025 COMPLETE CBC W/AUTO DIFF WBC: CPT | Mod: HCNC,NTX

## 2019-07-11 PROCEDURE — 80053 COMPREHEN METABOLIC PANEL: CPT | Mod: HCNC,NTX

## 2019-07-11 PROCEDURE — 83735 ASSAY OF MAGNESIUM: CPT | Mod: HCNC,NTX

## 2019-07-11 PROCEDURE — 84100 ASSAY OF PHOSPHORUS: CPT | Mod: HCNC,NTX

## 2019-07-11 PROCEDURE — 25000003 PHARM REV CODE 250: Mod: HCNC,NTX | Performed by: INTERNAL MEDICINE

## 2019-07-11 RX ORDER — POLYETHYLENE GLYCOL 3350 17 G/17G
17 POWDER, FOR SOLUTION ORAL 2 TIMES DAILY
Qty: 510 G | Refills: 11 | Status: SHIPPED | OUTPATIENT
Start: 2019-07-11 | End: 2019-07-23 | Stop reason: SDUPTHER

## 2019-07-11 RX ORDER — ERGOCALCIFEROL 1.25 MG/1
50000 CAPSULE ORAL
Start: 2019-07-11 | End: 2020-03-22 | Stop reason: SDUPTHER

## 2019-07-11 RX ORDER — AMOXICILLIN 250 MG
1 CAPSULE ORAL 2 TIMES DAILY PRN
COMMUNITY
Start: 2019-07-11 | End: 2019-07-23

## 2019-07-11 RX ORDER — BISACODYL 10 MG
10 SUPPOSITORY, RECTAL RECTAL DAILY PRN
Refills: 0 | COMMUNITY
Start: 2019-07-11 | End: 2019-07-23 | Stop reason: SDUPTHER

## 2019-07-11 RX ORDER — PREGABALIN 25 MG/1
25 CAPSULE ORAL DAILY
Qty: 90 CAPSULE | Refills: 4
Start: 2019-07-11 | End: 2020-04-29 | Stop reason: SDUPTHER

## 2019-07-11 RX ADMIN — SEVELAMER CARBONATE 800 MG: 800 TABLET, FILM COATED ORAL at 08:07

## 2019-07-11 RX ADMIN — FOLIC ACID 1 MG: 1 TABLET ORAL at 12:07

## 2019-07-11 RX ADMIN — FAMOTIDINE 20 MG: 20 TABLET, FILM COATED ORAL at 12:07

## 2019-07-11 RX ADMIN — FUROSEMIDE 40 MG: 40 TABLET ORAL at 12:07

## 2019-07-11 RX ADMIN — POLYETHYLENE GLYCOL 3350 17 G: 17 POWDER, FOR SOLUTION ORAL at 12:07

## 2019-07-11 RX ADMIN — BUPROPION HYDROCHLORIDE 300 MG: 150 TABLET, EXTENDED RELEASE ORAL at 12:07

## 2019-07-11 RX ADMIN — SODIUM CHLORIDE 350 ML: 0.9 INJECTION, SOLUTION INTRAVENOUS at 08:07

## 2019-07-11 RX ADMIN — SENNOSIDES AND DOCUSATE SODIUM 1 TABLET: 8.6; 5 TABLET ORAL at 12:07

## 2019-07-11 RX ADMIN — INSULIN ASPART 2 UNITS: 100 INJECTION, SOLUTION INTRAVENOUS; SUBCUTANEOUS at 09:07

## 2019-07-11 RX ADMIN — LEVETIRACETAM 500 MG: 500 TABLET, FILM COATED ORAL at 05:07

## 2019-07-11 RX ADMIN — ASPIRIN 81 MG: 81 TABLET, COATED ORAL at 05:07

## 2019-07-11 RX ADMIN — SEVELAMER CARBONATE 800 MG: 800 TABLET, FILM COATED ORAL at 12:07

## 2019-07-11 NOTE — PROGRESS NOTES
[facility] NOTE     Patient currently on hemodialysis for removal of uremic toxins and volume. Labs have been reviewed and the dialysate bath has been adjusted.     Patient seen and evaluated on hemodialysis, tolerating treatment, see HD flowsheet for vitals and assessments.      UF goal: 1-2 L as tolerated, keep map >65  BFR: 400  Anemia: Hb 8.0, will start EPO, iron panel: ferritin >1000, Sat 24, Fe 41  MBD: phos 4.6, continue sevelamer carbonate  Diet: renal      Assessment/Plan:    -Patient seen on HD, tolerating treatment well, w/o complaints   -Renal diet  -Strict I/O's and daily weights  -Daily renal function panels  -Will continue to follow while inpatient       Mesha Schwarz MD  Nephrology PGY-4

## 2019-07-11 NOTE — PROGRESS NOTES
Maintenance dialysis treatment (off schedule) Patient arrived in stretcher, transferred by RN and transporter to bed.    Right upper arm AV fistula positive for bruit and thrill (weak thrill) Right upper arm fistula cleaned and accessed per protocol. 15 G needles used. Flashback positive in arterial line. Clot aspirated from venous access line, flows good after clot aspiration. Both lines flowed well, lines connected, treatment initiated.    Will monitor patient and assess.

## 2019-07-11 NOTE — TELEPHONE ENCOUNTER
Upon request of her PCP, being scheduled with PC.   Spoke with her Dtr, Reina, confirmed for 7/23 at 1230 pm    QUANG

## 2019-07-11 NOTE — PLAN OF CARE
07/11/19 0924   Post-Acute Status   Post-Acute Authorization Other   Other Status No Post-Acute Service Needs

## 2019-07-11 NOTE — PLAN OF CARE
07/11/19 1626   Final Note   Assessment Type Final Discharge Note     Patient discharged home with no needs on 7/11/19.

## 2019-07-11 NOTE — DISCHARGE SUMMARY
"Discharge Summary  Hospital Medicine    Patient Name:  Lucy Perez  MRN:  [id number]  Attending Provider on Discharge: Brenna Parker MD  [place]ospital [place] [facility]  Date of Admission:  7/5/2019     Date of Discharge:  7/11/2019  1:47 PM  Code status: Full Code    Active Hospital Problems    Diagnosis  POA    Hernia of abdominal wall [K43.9]  Yes    Cholelithiasis [K80.20]  Yes    ESRD (end stage renal disease) on dialysis [N18.6, Z99.2]  Not Applicable    Type 2 diabetes mellitus with chronic kidney disease on chronic dialysis, without long-term current use of insulin [E11.22, N18.6, Z99.2]  Not Applicable    Vitamin D deficiency [E55.9]  Yes    LEFT Hemiplegia as late effect of stroke [I69.359]  Not Applicable    Essential hypertension [I10]  Yes    Anemia in ESRD (end-stage renal disease) [N18.6, D63.1]  Yes    Slow transit constipation [K59.01]  Yes    Partial symptomatic epilepsy with complex partial seizures, not intractable, without status epilepticus [G40.209]  Yes    Chronic diastolic CHF (congestive heart failure) [I50.32]  Yes    Sarcoidosis [D86.9]  Yes     Chronic    Hyperlipidemia [E78.5]  Yes    THERESA (obstructive sleep apnea) [G47.33]  Yes    Mixed anxiety and depressive disorder [F41.8]  Yes      Resolved Hospital Problems    Diagnosis Date Resolved POA    *Hyperkalemia [E87.5] 07/11/2019 Yes    Right lower quadrant abdominal pain [R10.31] 07/11/2019 Yes    AV graft malfunction [T82.590A] 07/11/2019 Yes    Acute respiratory failure with hypoxia [J96.01] 07/11/2019 Yes        HPI:  "57 yo F with ESRD on HD MWF, last session Wednesday (7/3/19), T2DM on insulin, sarcoidosis, CHF (last EF 60-65%, grade 2 diastolic dysfunction), RA, CVA from hemorrhage with residual L hemiplegia, and epilepsy presents with RLQ abdominal pain x1 day. Patient reports pain woke her up from sleep at 4 AM this morning. She describes it a constant, sharp pain that originally radiated " "to her back but has since localized to just RLQ. Pain has not worsened since onset but is worse with laying down and improved with sitting up. She has had similar episode of pain in the past (a few months ago) and was told it was gallstones. She was told to avoid fatty/spicy foods, which she reports compliance with. Patient also complaining of SOB, cough, and wheezing for past 2 days beginning after last dialysis session. She is on 2L O2 at home PRN for CHF symptoms. She endorses chronic constipation with last BM 2 days ago and described as hard (typical) without noted melena or hematochezia. Her last dialysis session was 7/3/19 without any complications, dry weight reported as 101.9 kg. She still produces small amount of urine. Of note, she had recent graft declotting procedure on 6/20/19 and was admitted to the hospital the following day for hematuria. She was discharged home with Urology follow-up. In the ED: CT abdomen with contrast remarkable for: several small layering gallstones without evidence of cholecystitis.Calcification in the expected course of the mid right ureter that is likely vascular calcification in the right ovarian vein. Small fat containing ventral wall hernia. Irregular opacities at the lung bases likely atelectasis."    Hospital Course: "59 yo F with ESRD on HD MWF, last session Wednesday (7/3/19), T2DM on insulin, sarcoidosis, CHF (last EF 60-65%, grade 2 diastolic dysfunction), RA, CVA from hemorrhage with residual L hemiplegia, and epilepsy admitted for concerns of hyperkalemia, she had missed HD session on Friday, prior to admission. On Friday evening in hospital attempted HD was terminated after 30-40 min due to high pressures s/p cannulation of AVG. She had a recent (6/20) vascular procedure for stenosis for AV graft stenosis. Vascular Surgery was consulted this admission.  Due to missed HD session patient with hyperkalemia, patient had urgent evening placement of a Trialysis with " "partial HD session due to line clotting off. Vascular Surgery consulted."    Hyperkalemia/ESRD / AV graft malfunction   -Hyperkalemia s/p medical therapy, urgent HD session limited by lack of full Trialysis use.   -Nephrology consulted, Alteplase instilled to femoral catheter   -continue bicarbonate supplements  -continue sevelamer  -Vascular Surgery performed Fistulagram with Stent placement, R AVG (9x30 Lifestent) and R innominate vein (12 x 60 Lifestar).  -Stable, removal of temporary HD access on 7/10; HD successful     RLQ Abdominal Pain, Cholelithiasis   Constipation-slow transit  -Suspect constipation contributing to symptoms - opiates discontinued as they will only worsen this              >no BM with additional oral medications              >suppository, if no BM, Enema              > notes in past they have used Fleets Enema - Advised against Fleets Enema or any Enema with Phosphorus due to ESRD and this is contraindication.   -no cholestasis by labs  -patient tolerating diet   -CT abdomen w/o acute explanation.   -some response to laxatives, monitoring  -Follow up with General Surgery as outpatient      Acute respiratory failure with hypoxia   -Weaning parameters, resolved       Recent Labs   Lab 07/09/19  0334 07/10/19  0742 07/11/19  0335   WBC 7.14 7.65 5.72   HGB 8.3* 8.8* 8.0*   HCT 27.5* 28.6* 26.1*    195 198     Recent Labs   Lab 07/05/19  0845  07/09/19  0334 07/10/19  0526 07/11/19  0335      < > 134* 139 137   K 5.6*   < > 5.5* 4.4 4.1   CL 94*   < > 98 100 96   CO2 29   < > 22* 26 28   BUN 57*   < > 52* 41* 58*   CREATININE 7.3*   < > 7.1* 6.3* 7.7*   *   < > 200* 157* 242*   CALCIUM 10.0   < > 8.5* 8.5* 9.0   MG 2.5   < > 2.2 2.2 2.4   PHOS 4.9*   < > 5.0* 4.4 4.6*   LIPASE 64*  --   --   --   --     < > = values in this interval not displayed.     Recent Labs   Lab 07/09/19  0334 07/10/19  0526 07/11/19  0335   ALKPHOS 81 77 79   ALT <5* <5* <5*   AST 11 10 7* "   ALBUMIN 2.9* 2.9* 3.0*   PROT 6.5 6.5 6.8   BILITOT 0.3 0.4 0.3      Recent Labs   Lab 07/09/19  1050 07/09/19  1704 07/09/19  2108 07/10/19  0805 07/10/19  1151 07/10/19  1628   POCTGLUCOSE 165* 232* 260* 177* 276* 189*        Procedures: Fistulogram    Consultants:   Consults (From admission, onward)        Status Ordering Provider     Inpatient consult to Nephrology  Once     Provider:  (Not yet assigned)    Completed NEENA WOODY     Inpatient consult to Vascular Surgery  Once     Provider:  (Not yet assigned)    Completed MITCHEL MCMILLAN          Medications:    Current Discharge Medication List      START taking these medications    Details   bisacodyl (DULCOLAX) 10 mg Supp Place 1 suppository (10 mg total) rectally daily as needed.  Refills: 0      senna-docusate 8.6-50 mg (PERICOLACE) 8.6-50 mg per tablet Take 1 tablet by mouth 2 (two) times daily as needed for Constipation.         CONTINUE these medications which have CHANGED    Details   ergocalciferol (ERGOCALCIFEROL) 50,000 unit Cap Take 1 capsule (50,000 Units total) by mouth every 7 days.      polyethylene glycol (MIRALAX) 17 gram/dose powder Take 17 g by mouth 2 (two) times daily.  Qty: 510 g, Refills: 11      pregabalin (LYRICA) 25 MG capsule Take 1 capsule (25 mg total) by mouth once daily. May take additional dose after HD.  Qty: 90 capsule, Refills: 4    Associated Diagnoses: Muscle spasticity         CONTINUE these medications which have NOT CHANGED    Details   albuterol (PROVENTIL) 2.5 mg /3 mL (0.083 %) nebulizer solution Take 3 mLs (2.5 mg total) by nebulization every 6 (six) hours as needed for Wheezing. Rescue  Qty: 25 each, Refills: 3    Associated Diagnoses: Chest congestion      albuterol (VENTOLIN HFA) 90 mcg/actuation inhaler Inhale 2 puffs into the lungs every 6 (six) hours as needed for Wheezing. Rescue  Qty: 18 g, Refills: 0    Associated Diagnoses: Chest congestion      aspirin (ECOTRIN) 81 MG EC tablet Take 81 mg by  "mouth every morning.       atorvastatin (LIPITOR) 40 MG tablet TAKE 1 TABLET ONE TIME DAILY FOR CHOLESTEROL  Qty: 90 tablet, Refills: 2    Associated Diagnoses: Pure hypercholesterolemia      buPROPion (WELLBUTRIN XL) 300 MG 24 hr tablet Take 1 tablet (300 mg total) by mouth once daily.  Qty: 30 tablet, Refills: 6    Associated Diagnoses: Depression, unspecified depression type      DULCOLAX, BISACODYL, ORAL Take by mouth as needed (constipation).      famotidine (PEPCID) 20 MG tablet Take 1 tablet (20 mg total) by mouth once daily.  Qty: 90 tablet, Refills: 2    Associated Diagnoses: Hemorrhagic cerebrovascular accident (CVA)      folic acid (FOLVITE) 1 MG tablet Take 1 tablet (1 mg total) by mouth once daily.  Qty: 90 tablet, Refills: 2    Associated Diagnoses: Folate deficiency      furosemide (LASIX) 40 MG tablet Take 1 tablet (40 mg total) by mouth once daily.  Qty: 90 tablet, Refills: 2      guaiFENesin (MUCINEX) 1,200 mg Ta12 1 tab every 12 hours as needed for congestion  Qty: 24 tablet, Refills: 0    Associated Diagnoses: Chest congestion      levETIRAcetam (KEPPRA) 500 MG Tab Take 1 tablet (500 mg total) by mouth every 8 (eight) hours.  Qty: 270 tablet, Refills: 2    Associated Diagnoses: Hemorrhagic cerebrovascular accident (CVA)      sevelamer carbonate (RENVELA) 800 mg Tab Take 800 mg by mouth 3 (three) times daily with meals.      traMADol (ULTRAM) 50 mg tablet 1 tab Q6-8 hours as needed for joint pain  Qty: 30 tablet, Refills: 0      acetaminophen (TYLENOL) 325 MG tablet Take 2 tablets (650 mg total) by mouth every 6 (six) hours as needed for Pain.  Refills: 0      ALPRAZolam (XANAX) 0.5 MG tablet 1 tab Every 8 hours as needed for anxiety  Qty: 30 tablet, Refills: 0    Associated Diagnoses: Depression, unspecified depression type      BD INSULIN PEN NEEDLE UF SHORT 31 gauge x 5/16" Ndle USE TO INJECT NOVOLOG FLEXPEN BEFORE MEALS  Qty: 150 each, Refills: 11      blood sugar diagnostic (ACCU-CHEK " SMARTVIEW TEST STRIP) Strp 1 strip by Misc.(Non-Drug; Combo Route) route 2 (two) times daily.  Qty: 300 each, Refills: 3    Associated Diagnoses: Type 2 diabetes mellitus with chronic kidney disease      ciprofloxacin HCl (CILOXAN) 0.3 % ophthalmic solution Place 2 drops into both eyes every 2 (two) hours. 2 drops/affected eye 3-4x/day x 3-5 days  Qty: 5 mL, Refills: 0    Associated Diagnoses: Conjunctivitis, unspecified conjunctivitis type, unspecified laterality      flash glucose scanning reader (FREESTYLE JOSÉ LUIS 14 DAY READER) Misc Use as directed. Scan 4 times a day.  Qty: 1 each, Refills: 0      flash glucose sensor (FREESTYLE JOSÉ LUIS 14 DAY SENSOR) Kit Change every 14 days.  Qty: 2 kit, Refills: 11      lidocaine-prilocaine (EMLA) cream Apply topically as needed.  Qty: 30 g, Refills: 1         STOP taking these medications       carvedilol (COREG) 25 MG tablet Comments:   Reason for Stopping:         insulin glargine (LANTUS U-100 INSULIN) 100 unit/mL injection Comments:   Reason for Stopping:         sodium bicarbonate 650 MG tablet Comments:   Reason for Stopping:         amoxicillin-clavulanate 500-125mg (AUGMENTIN) 500-125 mg Tab Comments:   Reason for Stopping:         dantrolene (DANTRIUM) 50 MG Cap Comments:   Reason for Stopping:         ferrous sulfate 220 mg (44 mg iron)/5 mL solution Comments:   Reason for Stopping:               Discharge Instructions:  Discharge Procedure Orders   Ambulatory referral to General Surgery   Referral Priority: Routine Referral Type: Consultation   Referral Reason: Specialty Services Required   Requested Specialty: General Surgery   Number of Visits Requested: 1     Diet Adult Regular     Order Specific Question Answer Comments   Na restriction, if any: 2gNa    Fluid restriction: Fluid - 1500mL    Additional restrictions: Diabetic 2200    Additional restrictions: Renal    Additional restrictions: Low Potassium      Notify your health care provider if you experience any  of the following:  temperature >100.4     Notify your health care provider if you experience any of the following:  persistent nausea and vomiting or diarrhea     Notify your health care provider if you experience any of the following:  difficulty breathing or increased cough     Notify your health care provider if you experience any of the following:  persistent dizziness, light-headedness, or visual disturbances     Notify your health care provider if you experience any of the following:  increased confusion or weakness     Activity as tolerated       Discharge Condition: stable    Disposition: Home or Self Care    Indwelling Lines/Drains at time of discharge: AVG RUE    Tests pending at the time of discharge:   HepBsAg, A1c     Time spent on the discharge of the patient including review of hospital course with the patient, reviewing discharge medications and arranging follow-up care: 40 minutes.    Discharge examination Patient was seen and examined on 7/11/2019 and determined to be suitable for discharge.    Discharge plan and follow up:  Follow-up Information     Beverly Muniz MD On 7/15/2019.    Specialty:  Internal Medicine  Why:  Dr. Max's nurse to call the patient with appointment date & time.   Contact information:  [address]  [place] [address]  [phone]         [facility] GENERAL SURGERY. Schedule an appointment as soon as possible for a visit in 1 week.    Specialty:  General Surgery  Why:  For discharge from hospital follow up, To establish care  Contact information:  [address]  [facility] [address]  [phone]             Future Appointments   Date Time Provider Department Center   7/23/2019  2:00 PM Abisai Mcintosh MD [facility] EPI [facility]   7/25/2019 11:00 AM Fior Ricks DPM [facility] POD [facility]   8/6/2019  7:00 AM LAB, TRANSPLANT [facility] LABTX [facility]   8/6/2019  8:00 AM KIDNEY, TRANSPLANT [facility] KIDNTX [facility]   8/6/2019  1:15 PM INJECTION, INFECTIOUS  DISEASES [facility] ID INJ [address]   8/6/2019  2:00 PM [facility] US TRANSPLANT ONLY [facility] ULTR [facility]   8/6/2019  2:45 PM [facility] US TRANSPLANT ONLY [facility]   8/6/2019  3:30 PM [facility] OIC-XRAY [facility] XRAY [facility]   8/6/2019  3:45 PM [facility] OIC-XRAY [facility] XRAY [facility]   8/27/2019  9:30 AM CARDIAC, PET IMAGING [facility] CARDPET [address]   8/27/2019 10:15 AM ECHO, [facility] ECHOLAB [address]   8/27/2019 11:20 AM Alonso Storey MD [facility] CARDIO [address]   8/29/2019  9:30 AM LAB, [place] EL LAB [place]   8/29/2019  9:45 AM ZACH JENNINGS EL LAB [place]   8/29/2019 10:00 AM NURSE, [facility] INT MED [place] [address] [facility] ODALYS CAR [place]   9/3/2019  9:30 AM Beverly Muniz MD [facility] ODALYS CAR [place]       Provider  Brenna Parker MD  Department of Hospital Medicine  [facility]

## 2019-07-11 NOTE — PROGRESS NOTES
Dialysis treatment complete. Blood rinsed back without resistance.    2L of fluid removed over 3.5 hours. Sevelamer and insulin given during treatment. Patient tolerated treatment well.    Right AV fistula needles pulled. Pressure held x5 minutes. Gauze and paper tape applied to make pressure dressing.    Patient pulled from bed to stretcher by RN and one transporter. Transported off unit in stretcher by one transporter.

## 2019-07-11 NOTE — PLAN OF CARE
Plan of care reviewed with pt and . VS stable. No acute events at this time. No complaints.  assists pt with turns and bathroom. Pt had BM 7/10. Plan for HD today. Pt remains free from falls or injury. Bed low and locked, call light and personal belongings within reach. Will continue to monitor.

## 2019-07-11 NOTE — NURSING
Patient discharging home with  and daughter. Patient was wheelchaired by family in her own WC to vehicle. All belongings returned. IV flushed and discontinued intact. No further questions or concerns. Patient was educated on Pattie//low sodium/potassium diet, s/s HF, medications, and follow  up appointments. All questions answered. Approximately 20 minutes spent on discharge education.

## 2019-07-11 NOTE — NURSING TRANSFER
Nursing Transfer Note      7/11/2019     Transfer From: 346 to dialysis    Transfer via stretcher    Transfer with cardiac monitoring    Transported by transportx1       Telephone Encounter by Kateryna Malave at 04/24/17 12:51 PM     Author:  Kateryna Malave Service:  (none) Author Type:  Patient      Filed:  04/24/17 12:57 PM Encounter Date:  4/24/2017 Status:  Signed     :  Kateryna Malave (Patient )            Open in error.[MM1.1M]         Revision History        User Key Date/Time User Provider Type Action    > MM1.1 04/24/17 12:57 PM Kateryna Malave Patient  Sign    M - Manual

## 2019-07-15 ENCOUNTER — PATIENT OUTREACH (OUTPATIENT)
Dept: ADMINISTRATIVE | Facility: CLINIC | Age: 59
End: 2019-07-15

## 2019-07-15 NOTE — PATIENT INSTRUCTIONS
"  Discharge Instructions for Hyperkalemia  You have been diagnosed with hyperkalemia (a high level of potassium in the blood). Potassium is important to the function of the nerve and muscle cells, including the cells of the heart. But a high level of potassium in the blood can cause  serious problems such as abnormal heart rhythms and even heart attack.  Diet changes  · Eat less of these potassium-rich foods:  ¨ Bananas (avoid bananas completely)  ¨ Apricots, fresh or dried  ¨ Oranges and orange juice  ¨ Grapefruit juice  ¨ Tomatoes, tomato sauce, and tomato juice  ¨ Spinach  ¨ Green, leafy vegetables, including salad greens, kale, broccoli, chard, and collards  ¨ Melons (all kinds)  ¨ Peas  ¨ Beans  ¨ Potatoes  ¨ Sweet potatoes  ¨ Avocados and guacamole  ¨ Vegetable juice (homemade or store-bought) and vegetable juice cocktail  ¨ Fruit juices  ¨ Nuts, including pistachios, almonds, peanuts, hazelnuts, [place], cashew, mixed  ¨ "Lite" or reduced sodium salt  Other home care  · Tell your healthcare provider about all prescription and over-the-counter medicines you are taking. Certain medicines can increase potassium levels.  · Take all medicines exactly as directed.  · Have your potassium levels checked regularly.  · Keep all follow-up appointments. Your healthcare provider needs to monitor your condition closely.  · Learn to take your own pulse. If your pulse is less than 60 beats per minute or irregular, call your provider.  Follow-up  Make a follow-up appointment as directed by our staff.     When to Call Your healthcare provider  Call your provider right away if you have any of the following:  · Chest pain (call 911)  · Fainting (call 911)  · Shortness of breath (call 911 if severe)  · Slow, irregular heartbeat  · Fatigue  · Dizziness  · Lightheadedness  · Confusion   Date Last Reviewed: 6/19/2015  © 2311-7123 [employer]. [address] [place] [address], [place] [address]. All rights reserved. This " information is not intended as a substitute for professional medical care. Always follow your healthcare professional's instructions.

## 2019-07-17 NOTE — PROGRESS NOTES
[facility]  New Visit Progress Note   Recent Hospital Discharge     PRESENTING HISTORY     Chief Complaint/Reason for Visit:  Follow up Hospital Discharge   No chief complaint on file.    PCP: Beverly Muniz MD    History of Present Illness: Ms. Lucy Perez is a 58 y.o. female who was recently admitted to the hospital.      Brenna Parker MD   Physician   Hospital Medicine          Discharge Summary   Signed                                                       Expand widget buttonCollapse widget button          Hide copied text      Hover for detailscustomization button                                                                                                                                                            Discharge Summary    Hospital Medicine         Patient Name:  Lucy Perez    MRN:  [id number]    Attending Provider on Discharge: Brenna Parker MD    [facility] Medicine Team: [facility] MED D    Date of Admission:  7/5/2019       Date of Discharge:  7/11/2019  1:47 PM    Code status: Full Code                  Active Hospital Problems             Diagnosis           POA            Hernia of abdominal wall [K43.9]           Yes            Cholelithiasis [K80.20]           Yes            ESRD (end stage renal disease) on dialysis [N18.6, Z99.2]           Not Applicable            Type 2 diabetes mellitus with chronic kidney disease on chronic dialysis, without long-term current use of insulin [E11.22, N18.6, Z99.2]           Not Applicable            Vitamin D deficiency [E55.9]           Yes            LEFT Hemiplegia as late effect of stroke [I69.359]           Not Applicable            Essential hypertension [I10]           Yes            Anemia in ESRD (end-stage renal disease) [N18.6, D63.1]           Yes            Slow transit constipation [K59.01]           Yes            Partial symptomatic epilepsy with complex partial seizures, not  "intractable, without status epilepticus [G40.209]           Yes            Chronic diastolic CHF (congestive heart failure) [I50.32]           Yes            Sarcoidosis [D86.9]           Yes                   Chronic            Hyperlipidemia [E78.5]           Yes            THERESA (obstructive sleep apnea) [G47.33]           Yes            Mixed anxiety and depressive disorder [F41.8]           Yes               Resolved Hospital Problems             Diagnosis     Date Resolved     POA            *Hyperkalemia [E87.5]     07/11/2019     Yes            Right lower quadrant abdominal pain [R10.31]     07/11/2019     Yes            AV graft malfunction [T82.590A]     07/11/2019     Yes            Acute respiratory failure with hypoxia [J96.01]     07/11/2019     Yes                 HPI:  "57 yo F with ESRD on HD MWF, last session Wednesday (7/3/19), T2DM on insulin, sarcoidosis, CHF (last EF 60-65%, grade 2 diastolic dysfunction), RA, CVA from hemorrhage with residual L hemiplegia, and epilepsy presents with RLQ abdominal pain x1 day. Patient reports pain woke her up from sleep at 4 AM this morning. She describes it a constant, sharp pain that originally radiated to her back but has since localized to just RLQ. Pain has not worsened since onset but is worse with laying down and improved with sitting up. She has had similar episode of pain in the past (a few months ago) and was told it was gallstones. She was told to avoid fatty/spicy foods, which she reports compliance with. Patient also complaining of SOB, cough, and wheezing for past 2 days beginning after last dialysis session. She is on 2L O2 at home PRN for CHF symptoms. She endorses chronic constipation with last BM 2 days ago and described as hard (typical) without noted melena or hematochezia. Her last dialysis session was 7/3/19 without any complications, dry weight reported as 101.9 kg. She still produces small amount of urine. Of note, she had recent " "graft declotting procedure on 6/20/19 and was admitted to the hospital the following day for hematuria. She was discharged home with Urology follow-up. In the ED: CT abdomen with contrast remarkable for: several small layering gallstones without evidence of cholecystitis.Calcification in the expected course of the mid right ureter that is likely vascular calcification in the right ovarian vein. Small fat containing ventral wall hernia. Irregular opacities at the lung bases likely atelectasis."         Hospital Course: "59 yo F with ESRD on HD MWF, last session Wednesday (7/3/19), T2DM on insulin, sarcoidosis, CHF (last EF 60-65%, grade 2 diastolic dysfunction), RA, CVA from hemorrhage with residual L hemiplegia, and epilepsy admitted for concerns of hyperkalemia, she had missed HD session on Friday, prior to admission. On Friday evening in hospital attempted HD was terminated after 30-40 min due to high pressures s/p cannulation of AVG. She had a recent (6/20) vascular procedure for stenosis for AV graft stenosis. Vascular Surgery was consulted this admission.  Due to missed HD session patient with hyperkalemia, patient had urgent evening placement of a Trialysis with partial HD session due to line clotting off. Vascular Surgery consulted."         Hyperkalemia/ESRD / AV graft malfunction     -Hyperkalemia s/p medical therapy, urgent HD session limited by lack of full Trialysis use.     -Nephrology consulted, Alteplase instilled to femoral catheter     -continue bicarbonate supplements    -continue sevelamer    -Vascular Surgery performed Fistulagram with Stent placement, R AVG (9x30 Lifestent) and R innominate vein (12 x 60 Lifestar).    -Stable, removal of temporary HD access on 7/10; HD successful         RLQ Abdominal Pain, Cholelithiasis     Constipation-slow transit    -Suspect constipation contributing to symptoms - opiates discontinued as they will only worsen this                >no BM with additional oral " medications                >suppository, if no BM, Enema                > notes in past they have used Fleets Enema - Advised against Fleets Enema or any Enema with Phosphorus due to ESRD and this is contraindication.     -no cholestasis by labs    -patient tolerating diet     -CT abdomen w/o acute explanation.     -some response to laxatives, monitoring    -Follow up with General Surgery as outpatient          Acute respiratory failure with hypoxia     -Weaning parameters, resolved                      Recent Labs       Lab     07/09/19    0334     07/10/19    0742     07/11/19    0335       WBC     7.14     7.65     5.72       HGB     8.3*     8.8*     8.0*       HCT     27.5*     28.6*     26.1*       PLT     170     195     198                      Recent Labs       Lab     07/05/19    0845           07/09/19    0334     07/10/19    0526     07/11/19    0335       NA     139       < >     134*     139     137       K     5.6*       < >     5.5*     4.4     4.1       CL     94*       < >     98     100     96       CO2     29       < >     22*     26     28       BUN     57*       < >     52*     41*     58*       CREATININE     7.3*       < >     7.1*     6.3*     7.7*       GLU     234*       < >     200*     157*     242*       CALCIUM     10.0       < >     8.5*     8.5*     9.0       MG     2.5       < >     2.2     2.2     2.4       PHOS     4.9*       < >     5.0*     4.4     4.6*       LIPASE     64*      --       --       --       --         < > = values in this interval not displayed.                    Recent Labs       Lab     07/09/19    0334     07/10/19    0526     07/11/19    0335       ALKPHOS     81     77     79       ALT     <5*     <5*     <5*       AST     11     10     7*       ALBUMIN     2.9*     2.9*     3.0*       PROT     6.5     6.5     6.8       BILITOT     0.3     0.4     0.3                       Recent Labs       Lab     07/09/19    1050     07/09/19    1704      07/09/19    2108     07/10/19    0805     07/10/19    1151     07/10/19    1628       POCTGLUCOSE     165*     232*     260*     177*     276*     189*                 Procedures: Fistulogram         Consultants:               Consults (From admission, onward)                                   Status       Ordering Provider                       Inpatient consult to Nephrology  Once         Provider:  (Not yet assigned)          Completed     NEENA WOODY                     Inpatient consult to Vascular Surgery  Once         Provider:  (Not yet assigned)          Completed     MITCHEL MCMILLAN                             Medications:                 Current Discharge Medication List                       START taking these medications             Details       bisacodyl (DULCOLAX) 10 mg Supp     Place 1 suppository (10 mg total) rectally daily as needed.    Refills: 0               senna-docusate 8.6-50 mg (PERICOLACE) 8.6-50 mg per tablet     Take 1 tablet by mouth 2 (two) times daily as needed for Constipation.                               CONTINUE these medications which have CHANGED             Details       ergocalciferol (ERGOCALCIFEROL) 50,000 unit Cap     Take 1 capsule (50,000 Units total) by mouth every 7 days.               polyethylene glycol (MIRALAX) 17 gram/dose powder     Take 17 g by mouth 2 (two) times daily.    Qty: 510 g, Refills: 11               pregabalin (LYRICA) 25 MG capsule     Take 1 capsule (25 mg total) by mouth once daily. May take additional dose after HD.    Qty: 90 capsule, Refills: 4             Associated Diagnoses: Muscle spasticity                               CONTINUE these medications which have NOT CHANGED             Details       albuterol (PROVENTIL) 2.5 mg /3 mL (0.083 %) nebulizer solution     Take 3 mLs (2.5 mg total) by nebulization every 6 (six) hours as needed for Wheezing. Rescue    Qty: 25 each, Refills: 3             Associated Diagnoses: Chest  congestion               albuterol (VENTOLIN HFA) 90 mcg/actuation inhaler     Inhale 2 puffs into the lungs every 6 (six) hours as needed for Wheezing. Rescue    Qty: 18 g, Refills: 0             Associated Diagnoses: Chest congestion               aspirin (ECOTRIN) 81 MG EC tablet     Take 81 mg by mouth every morning.                atorvastatin (LIPITOR) 40 MG tablet     TAKE 1 TABLET ONE TIME DAILY FOR CHOLESTEROL    Qty: 90 tablet, Refills: 2             Associated Diagnoses: Pure hypercholesterolemia               buPROPion (WELLBUTRIN XL) 300 MG 24 hr tablet     Take 1 tablet (300 mg total) by mouth once daily.    Qty: 30 tablet, Refills: 6             Associated Diagnoses: Depression, unspecified depression type               DULCOLAX, BISACODYL, ORAL     Take by mouth as needed (constipation).               famotidine (PEPCID) 20 MG tablet     Take 1 tablet (20 mg total) by mouth once daily.    Qty: 90 tablet, Refills: 2             Associated Diagnoses: Hemorrhagic cerebrovascular accident (CVA)               folic acid (FOLVITE) 1 MG tablet     Take 1 tablet (1 mg total) by mouth once daily.    Qty: 90 tablet, Refills: 2             Associated Diagnoses: Folate deficiency               furosemide (LASIX) 40 MG tablet     Take 1 tablet (40 mg total) by mouth once daily.    Qty: 90 tablet, Refills: 2               guaiFENesin (MUCINEX) 1,200 mg Ta12     1 tab every 12 hours as needed for congestion    Qty: 24 tablet, Refills: 0             Associated Diagnoses: Chest congestion               levETIRAcetam (KEPPRA) 500 MG Tab     Take 1 tablet (500 mg total) by mouth every 8 (eight) hours.    Qty: 270 tablet, Refills: 2             Associated Diagnoses: Hemorrhagic cerebrovascular accident (CVA)               sevelamer carbonate (RENVELA) 800 mg Tab     Take 800 mg by mouth 3 (three) times daily with meals.               traMADol (ULTRAM) 50 mg tablet     1 tab Q6-8 hours as needed for joint pain    Qty: 30  "tablet, Refills: 0               acetaminophen (TYLENOL) 325 MG tablet     Take 2 tablets (650 mg total) by mouth every 6 (six) hours as needed for Pain.    Refills: 0               ALPRAZolam (XANAX) 0.5 MG tablet     1 tab Every 8 hours as needed for anxiety    Qty: 30 tablet, Refills: 0             Associated Diagnoses: Depression, unspecified depression type               BD INSULIN PEN NEEDLE UF SHORT 31 gauge x 5/16" Ndle     USE TO INJECT NOVOLOG FLEXPEN BEFORE MEALS    Qty: 150 each, Refills: 11               blood sugar diagnostic (ACCU-CHEK SMARTVIEW TEST STRIP) Strp     1 strip by Misc.(Non-Drug; Combo Route) route 2 (two) times daily.    Qty: 300 each, Refills: 3             Associated Diagnoses: Type 2 diabetes mellitus with chronic kidney disease               ciprofloxacin HCl (CILOXAN) 0.3 % ophthalmic solution     Place 2 drops into both eyes every 2 (two) hours. 2 drops/affected eye 3-4x/day x 3-5 days    Qty: 5 mL, Refills: 0             Associated Diagnoses: Conjunctivitis, unspecified conjunctivitis type, unspecified laterality               flash glucose scanning reader (FREESTYLE JOSÉ LUIS 14 DAY READER) [facility]     Use as directed. Scan 4 times a day.    Qty: 1 each, Refills: 0               flash glucose sensor (FREESTYLE JOSÉ LUIS 14 DAY SENSOR) Kit     Change every 14 days.    Qty: 2 kit, Refills: 11               lidocaine-prilocaine (EMLA) cream     Apply topically as needed.    Qty: 30 g, Refills: 1                              STOP taking these medications                     carvedilol (COREG) 25 MG tablet     Comments:     Reason for Stopping:                            insulin glargine (LANTUS U-100 INSULIN) 100 unit/mL injection     Comments:     Reason for Stopping:                            sodium bicarbonate 650 MG tablet     Comments:     Reason for Stopping:                            amoxicillin-clavulanate 500-125mg (AUGMENTIN) 500-125 mg Tab     Comments:     Reason for Stopping:  "                           dantrolene (DANTRIUM) 50 MG Cap     Comments:     Reason for Stopping:                            ferrous sulfate 220 mg (44 mg iron)/5 mL solution     Comments:     Reason for Stopping:                                        Discharge Instructions:          Discharge Procedure Orders       Ambulatory referral to General Surgery       Referral Priority: Routine     Referral Type: Consultation       Referral Reason: Specialty Services Required       Requested Specialty: General Surgery       Number of Visits Requested: 1              Diet Adult Regular              Order Specific Question     Answer     Comments       Na restriction, if any:     2gNa             Fluid restriction:     Fluid - 1500mL             Additional restrictions:     Diabetic 2200             Additional restrictions:     Renal             Additional restrictions:     Low Potassium                    Notify your health care provider if you experience any of the following:  temperature >100.4              Notify your health care provider if you experience any of the following:  persistent nausea and vomiting or diarrhea              Notify your health care provider if you experience any of the following:  difficulty breathing or increased cough              Notify your health care provider if you experience any of the following:  persistent dizziness, light-headedness, or visual disturbances              Notify your health care provider if you experience any of the following:  increased confusion or weakness              Activity as tolerated                 Discharge Condition: stable         Disposition: Home or Self Care         Indwelling Lines/Drains at time of discharge: AVG RUE         Tests pending at the time of discharge:   HepBsAg, A1c         Time spent on the discharge of the patient including review of hospital course with the patient, reviewing discharge medications and arranging follow-up care: 40  minutes.         Discharge examination Patient was seen and examined on 7/11/2019 and determined to be suitable for discharge.         Discharge plan and follow up:          Follow-up Information                    Beveryl Muniz MD On 7/15/2019.        Specialty:  Internal Medicine    Why:  Dr. Max's nurse to call the patient with appointment date & time.     Contact information:    [address]    [facility] [address]    [phone]                                 [facility] GENERAL SURGERY. Schedule an appointment as soon as possible for a visit in 1 week.        Specialty:  General Surgery    Why:  For discharge from hospital follow up, To establish care    Contact information:    [address]    [facility] [address]    [phone]                                             Future Appointments       Date     Time     Provider     Department     Center       7/23/2019      2:00 PM     Ab[facility]intosh [facility]     [facility]       7/25/2019     11:00 AM     Fior Ricks DPM     [facility] POD     [facility]       8/6/2019      7:00 AM     LAB, TRANSPLANT     [facility] LABTX     [facility]       8/6/2019      8:00 AM     KIDNEY, TRANSPLANT     [facility] KIDNTX     [facility]       8/6/2019      1:15 PM     INJECTION, INFECTIOUS DISEASES     [facility] ID INJ     [facility]       8/6/2019      2:00 PM     [facility] US TRANSPLANT ONLY     [facility] ULTR IC     [facility]       8/6/2019      2:45 PM     [facility] US TRANSPLANT ONLY     [facility] ULTR IC     [facility]       8/6/2019      3:30 PM     [facility] OIC-XRAY     [facility] XRAY IC     [facility]       8/6/2019      3:45 PM     [facility] OIC-XRAY     [facility] XRAY IC     [facility]       8/27/2019      9:30 AM     CARDIAC, PET IMAGING     [facility] CARDPET     [facility]       8/27/2019     10:15 AM     ECHO, [facility]     [facility] ECHOLAB     [facility]       8/27/2019     11:20 AM     Alonso Storey MD     [facility] CARDIO     [facility]       8/29/2019      9:30 AM     CONY JENNINGS  "LAB     [place]       8/29/2019      9:45 AM     LABZACH     [facility] LAB     [place]       8/29/2019     10:00 AM     NURSE, [facility] INT MED DARYL 100     [facility] ODALYS CAR     [place]       9/3/2019      9:30 AM     Beverly Muniz MD     [facility] ODALYS CAR     [place]                 Provider    Brenna Parker MD    Department of [facility] Medicine    [facility]                Electronically signed by Brenna Parker MD at 7/16/2019  1:38 PM                            ED to Hosp-Admission (Discharged) on 7/5/2019                                Routing History                                Detailed Report                     ____________________________________    Today:  Patient is very pleasant female, here today with her supportive dtr, Reina.   Since most recent discharge, Ms. Ayala reports that "doing ok, except for still having some issues with my bowels". Denies change in appetite, N/V or breakthrough diarrhea        Review of Systems:  Eyes: denies visual changes at this time denies floaters   ENT: no nasal congestion or sore throat  Respiratory: no cough or shorness of breath  Cardiovascular: no chest pain or palpitations  Gastrointestinal: no nausea or vomiting, no abdominal pain or change in bowel habits  Genitourinary: no hematuria or dysuria; denies frequency  Hematologic/Lymphatic: no easy bruising or lymphadenopathy  Musculoskeletal: no arthralgias or myalgias  Neurological: no seizures or tremors  Endocrine: no heat or cold intolerance      PAST HISTORY:     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    Cortical cataract of both eyes 7/26/2016    " Decubitus ulcer of buttock, stage 2     Degeneration of lumbar or lumbosacral intervertebral disc 3/5/2013    S/p MRI L-spine 5/2009     Depression     Encounter for blood transfusion     ESRD on hemodialysis     Started August 2018    General anesthetics causing adverse effect in therapeutic use     Hemorrhagic cerebrovascular accident (CVA)     8/2016 [place] [facility] [place]iparesis    Hemorrhagic cerebrovascular accident (CVA) 1/3/2018    History of stroke 6/28/2017    s/p R-MCA stroke with R-putaminal hemorrhagic transformation in 8/2016 and 11/2016 ([place] [facility] with residual L hemiparesis, on AED s/p CVA      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    Hyperlipidemia     Hypertensive retinopathy of both eyes 7/26/2016    Impingement syndrome of right shoulder 7/2/2014    Left ventricular diastolic dysfunction with preserved systolic function 12/15/2015    Mixed anxiety and depressive disorder 3/5/2013    Obesity     THERESA (obstructive sleep apnea) 3/5/2013    No Home CPAP 2ndary to cost     Partial symptomatic epilepsy with complex partial seizures, not intractable, without status epilepticus     PEG (percutaneous endoscopic gastrostomy) status 6/28/2017    Renovascular hypertension 3/5/2013    Will resume home medications: amlodipine, labetalol, and hydralazine Will hold Lisinopril in setting of DARLING.   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 without macular edema, without long-term current use of insulin 8/2/2017    Type 2 diabetes mellitus with diabetic polyneuropathy, without long-term current use of insulin 10/22/2015    Type 2 diabetes  mellitus with stage 3 chronic kidney disease, without long-term current use of insulin 10/22/2015    Vertebral artery stenosis 3/5/2013    S/p Stenting per Dr Burnett        Past Surgical History:   Procedure Laterality Date    ARTHROSCOPY-SHOULDER WITH SUBACROMIAL DECOMPRESSION Right 7/9/2014    Performed [facility]ja [facility] at [facility] OR    AV GRAFT CREATION Right 10/18/2018    Performed by Meir Valencia [facility] at [facility] OR 2ND FLR    Biceps Tenotomy Right 7/9/2014    Performed by [facility]ja [facility] at [facility] OR    BREAST SURGERY      breast reduction    COLONOSCOPY N/A 8/11/2016    Performed by Jerry Vilchis MD at [facility] ENDO (4TH FLR)    DEBRIDEMENT-SHOULDER-ARTHROSCOPIC Labral Cuff Right 7/9/2014    Performed by [facility]antoja [facility] at [facility] OR    DECLOT-GRAFT Right 6/20/2019    Performed by Cabrera Irwin MD at [facility] CATH LAB    ESOPHAGOGASTRODUODENOSCOPY (EGD) N/A 12/6/2017    Performed by Dallas Lock Jr., [facility] at [facility] ENDO    [id number]-[id number] END Right 7/9/2014    Performed by [facility]all [facility]antoja [facility] at [facility] OR    Fistulogram Right 7/8/2019    Performed by WELLINGTON Palm III, MD at [facility] CATH LAB    Fistulogram Right 2/11/2019    Performed by Meir Valencia MD at [facility] CATH LAB    HYSTERECTOMY      PTA, Fistula  7/8/2019    Performed by WELLINGTON Palm III, MD at [facility] CATH LAB    ROTATOR CUFF REPAIR Right July 9, 2014    right side    Skull surgery      Aneurysm    stent placed      in vertebral artery    Stent, Fistula  7/8/2019    Performed by WELLINGTON Palm III, MD at [facility] CATH LAB    TOTAL REDUCTION MAMMOPLASTY      TUBAL LIGATION         Family History   Problem Relation Age of Onset    Cancer Mother 55        Breast cancer    Diabetes Mother     Hypertension Mother     Heart disease Mother     Heart attack Mother     Breast cancer Mother     Stroke Sister     Hypertension Sister     Sleep apnea Sister     No Known Problems Daughter     Diabetes  "Son         controlled    Bell's palsy Sister     Lupus Sister     Blindness Maternal Grandmother     Diabetes Unknown         "My entire family family has diabetes"    Stroke Maternal Aunt     Amblyopia Neg Hx     Cataracts Neg Hx     Glaucoma Neg Hx     Macular degeneration Neg Hx     Retinal detachment Neg Hx     Strabismus Neg Hx        Social History     Socioeconomic History    Marital status:      Spouse name: Not on file    Number of children: Not on file    Years of education: Not on file    Highest education level: Not on file   Occupational History    Not on file   Social Needs    Financial resource strain: Not on file    Food insecurity:     Worry: Not on file     Inability: Not on file    Transportation needs:     Medical: Not on file     Non-medical: Not on file   Tobacco Use    Smoking status: Never Smoker    Smokeless tobacco: Never Used   Substance and Sexual Activity    Alcohol use: Not Currently     Comment: occasional wine cooler     Drug use: No    Sexual activity: Yes     Partners: Male   Lifestyle    Physical activity:     Days per week: Not on file     Minutes per session: Not on file    Stress: Not on file   Relationships    Social connections:     Talks on phone: Not on file     Gets together: Not on file     Attends Confucianism service: Not on file     Active member of club or organization: Not on file     Attends meetings of clubs or organizations: Not on file     Relationship status: Not on file   Other Topics Concern    Not on file   Social History Narrative    . 2 children(Wilder and Reina). Does not work. Previously worked as a .        MEDICATIONS & ALLERGIES:     Current Outpatient Medications on File Prior to Visit   Medication Sig Dispense Refill    acetaminophen (TYLENOL) 325 MG tablet Take 2 tablets (650 mg total) by mouth every 6 (six) hours as needed for Pain.  0    albuterol (PROVENTIL) 2.5 mg /3 mL (0.083 %) " "nebulizer solution Take 3 mLs (2.5 mg total) by nebulization every 6 (six) hours as needed for Wheezing. Rescue 25 each 3    albuterol (VENTOLIN HFA) 90 mcg/actuation inhaler Inhale 2 puffs into the lungs every 6 (six) hours as needed for Wheezing. Rescue 18 g 0    ALPRAZolam (XANAX) 0.5 MG tablet 1 tab Every 8 hours as needed for anxiety 30 tablet 0    aspirin (ECOTRIN) 81 MG EC tablet Take 81 mg by mouth every morning.       atorvastatin (LIPITOR) 40 MG tablet TAKE 1 TABLET ONE TIME DAILY FOR CHOLESTEROL 90 tablet 2    BD INSULIN PEN NEEDLE UF SHORT 31 gauge x 5/16" Ndle USE TO INJECT NOVOLOG FLEXPEN BEFORE MEALS 150 each 11    bisacodyl (DULCOLAX) 10 mg Supp Place 1 suppository (10 mg total) rectally daily as needed.  0    blood sugar diagnostic (ACCU-CHEK SMARTVIEW TEST STRIP) Strp 1 strip by Misc.(Non-Drug; Combo Route) route 2 (two) times daily. 300 each 3    buPROPion (WELLBUTRIN XL) 300 MG 24 hr tablet Take 1 tablet (300 mg total) by mouth once daily. 30 tablet 6    ciprofloxacin HCl (CILOXAN) 0.3 % ophthalmic solution Place 2 drops into both eyes every 2 (two) hours. 2 drops/affected eye 3-4x/day x 3-5 days 5 mL 0    DULCOLAX, BISACODYL, ORAL Take by mouth as needed (constipation).      ergocalciferol (ERGOCALCIFEROL) 50,000 unit Cap Take 1 capsule (50,000 Units total) by mouth every 7 days.      famotidine (PEPCID) 20 MG tablet Take 1 tablet (20 mg total) by mouth once daily. 90 tablet 2    flash glucose scanning reader (PocketMobileSTYLE JOSÉ LUIS 14 DAY READER) Misc Use as directed. Scan 4 times a day. 1 each 0    flash glucose sensor (FREESTYLE JOSÉ LUIS 14 DAY SENSOR) Kit Change every 14 days. 2 kit 11    folic acid (FOLVITE) 1 MG tablet Take 1 tablet (1 mg total) by mouth once daily. 90 tablet 2    furosemide (LASIX) 40 MG tablet Take 1 tablet (40 mg total) by mouth once daily. (Patient taking differently: Take 40 mg by mouth every morning. ) 90 tablet 2    guaiFENesin (MUCINEX) 1,200 mg Ta12 1 tab " every 12 hours as needed for congestion 24 tablet 0    levETIRAcetam (KEPPRA) 500 MG Tab Take 1 tablet (500 mg total) by mouth every 8 (eight) hours. 270 tablet 2    lidocaine-prilocaine (EMLA) cream Apply topically as needed. 30 g 1    polyethylene glycol (MIRALAX) 17 gram/dose powder Take 17 g by mouth 2 (two) times daily. 510 g 11    pregabalin (LYRICA) 25 MG capsule Take 1 capsule (25 mg total) by mouth once daily. May take additional dose after HD. 90 capsule 4    senna-docusate 8.6-50 mg (PERICOLACE) 8.6-50 mg per tablet Take 1 tablet by mouth 2 (two) times daily as needed for Constipation.      sevelamer carbonate (RENVELA) 800 mg Tab Take 800 mg by mouth 3 (three) times daily with meals.      traMADol (ULTRAM) 50 mg tablet 1 tab Q6-8 hours as needed for joint pain 30 tablet 0     No current facility-administered medications on file prior to visit.         Review of patient's allergies indicates:   Allergen Reactions    Lisinopril Other (See Comments)     Angioedema      Vicodin [hydrocodone-acetaminophen] Rash     No problem with acetaminophen        OBJECTIVE:     Vital Signs:  There were no vitals filed for this visit.  Wt Readings from Last 1 Encounters:   07/08/19 0700 83.9 kg (184 lb 15.5 oz)   07/07/19 0426 83.8 kg (184 lb 11.9 oz)   07/05/19 1655 99.8 kg (220 lb)   07/05/19 1649 100.2 kg (220 lb 14.4 oz)   07/05/19 0811 101 kg (222 lb 10.6 oz)     There is no height or weight on file to calculate BMI.       Wt Readings from Last 3 Encounters:   07/23/19 83 kg (182 lb 15.7 oz)   07/08/19 83.9 kg (184 lb 15.5 oz)   06/22/19 97 kg (213 lb 13.5 oz)     Temp Readings from Last 3 Encounters:   07/11/19 98 °F (36.7 °C) (Oral)   06/23/19 98.4 °F (36.9 °C) (Oral)   06/20/19 98 °F (36.7 °C)     BP Readings from Last 3 Encounters:   07/23/19 125/75   07/11/19 119/62   06/23/19 (!) 118/56     Pulse Readings from Last 3 Encounters:   07/11/19 78   06/23/19 76   06/20/19 60         Physical Exam:  General:  Well developed, well nourished. No distress.  HEENT: Head is normocephalic, atraumatic; ears are normal.   Eyes: Clear conjunctiva.  Neck: Supple, symmetrical neck; trachea midline. No JVD  Lungs: Clear to auscultation bilaterally and normal respiratory effort.  Cardiovascular: Heart with regular rate and rhythm. No murmurs, gallops or rubs  Extremities: No LE edema. Pulses 2+ and symmetric.   RUE: AV Graft with + bruit and thrill   Abdomen: Abdomen is soft, non-tender non-distended with normal bowel sounds.  Skin: Skin color, texture, turgor normal. No rashes.  Musculoskeletal: wheelchair bound   Lymph Nodes: No cervical or supraclavicular adenopathy.  Neurologic: chronic Left Hemiparesis in the setting of history of CVA in 2016        Laboratory  Lab Results   Component Value Date    WBC 5.72 07/11/2019    HGB 8.0 (L) 07/11/2019    HCT 26.1 (L) 07/11/2019     (H) 07/11/2019     07/11/2019     BMP  Lab Results   Component Value Date     07/11/2019    K 4.1 07/11/2019    CL 96 07/11/2019    CO2 28 07/11/2019    BUN 58 (H) 07/11/2019    CREATININE 7.7 (H) 07/11/2019    CALCIUM 9.0 07/11/2019    ANIONGAP 13 07/11/2019    ESTGFRAFRICA 6.0 (A) 07/11/2019    EGFRNONAA 5.2 (A) 07/11/2019     Lab Results   Component Value Date    ALT <5 (L) 07/11/2019    AST 7 (L) 07/11/2019    ALKPHOS 79 07/11/2019    BILITOT 0.3 07/11/2019     Lab Results   Component Value Date    INR 0.9 12/08/2018    INR 0.9 08/04/2017    INR 0.9 08/03/2017     Lab Results   Component Value Date    HGBA1C 7.9 (H) 07/11/2019     No results for input(s): POCTGLUCOSE in the last 72 hours.        TRANSITION OF CARE:     [facility] On Call Contact Note: 7/15/2019    Family and/or Caretaker present at visit?  Yes.  Diagnostic tests reviewed/disposition: I have reviewed all completed as well as pending diagnostic tests at the time of discharge.  Disease/illness education: ESRD, Hyperkalemia, Epilepsy, chronic Constipation, THERESA, Meds  Home  "health/community services discussion/referrals: Patient does not have home health established from hospital visit.  They do not need home health.  If needed, we will set up home health for the patient.   Establishment or re-establishment of referral orders for community resources: No other necessary community resources.   Discussion with other health care providers: No discussion with other health care providers necessary.     Transition of Care Visit:     I have reviewed and updated the history and problem list.  I have reconciled the medication list.  I have discussed the hospitalization and current medical issues, prognosis and plans with the patient/family.  I  spent more than 50% of time discussing the care with the patient/family.  Total Face-[facility]-Face Encounter in [facility]: 60 minutes.    Medications Reconciliation:   I have reconciled the patient's home medications and discharge medications with the patient/family. I have updated all changes.  Refer to After-Visit Medication List.    ASSESSMENT & PLAN:     HIGH RISK CONDITION(S):        Recent admission for Hyperkalemia/ESRD / AV graft malfunction:   *Emergently dialized on Admit  *K at discharge: 4.1 on 7/11  (6.2 on admit)  -continue bicarbonate supplements  -continue sevelamer  (Compliant with taking all prescribed medications)  ` Lasix has been changed to bid per Dr. Perez at her Dialysis Center, [facility].   (HD: M-W-F)  -Vascular Surgery performed:    Fistulagram with Stent placement, R AVG (9x30 Lifestent) and R innominate vein (12 x 60 Lifestar).  ` check random serum K today to surveillance; she was uncertain if labs were drawn on yesterday at Dialysis and states, "they only do it on Mondays".          Recent admission with RLQ Abdominal Pain, Cholelithiasis / Constipation-slow transit:   (stable)    *Of note, during admission, CT abdomen w/o acute explanation  ` (not having consistent BMs on single daily dose of Miralax (change to " "BID dosing)   ` added Dulcolax suppos PRN if no BM after 2 days       Partial Epilepsy syndrome:   (stable / controlled)   `  Keppra every 8 hours   ` appt with [facility]psy [facility] 7/23 at 1400        Diabetes Mellitus II:   Most recent A1c: 7.9 % (7/11)  Home Range: (has not been checking consistently; reports that "on yesterday" was 150s)  ` Lantus 10 units nightly         Immunizations:   TD:  Has reportedly had at [facility] 13: Has reportedly had at [facility]         Future Appointments   Date Time Provider Department Center   7/23/2019  2:00 PM Abisai Mcintosh MD [facility] EPI [facility]   7/25/2019 11:00 AM Fior Ricks DPM [facility] POD [facility]   8/6/2019  7:00 AM LAB, TRANSPLANT [facility] LABTX [facility]   8/6/2019  8:00 AM KIDNEY, TRANSPLANT [facility] KIDNTX [facility]   8/6/2019  1:15 PM INJECTION, INFECTIOUS DISEASES [facility] ID INJ [facility]   8/6/2019  2:00 PM [facility]   8/6/2019  2:45 PM NOM US TRANSPLANT ONLY [facility] ULTR [facility]   8/6/2019  3:30 PM [facility]   8/6/2019  3:45 PM [facility]IC-XRAY [facility] XRAY [facility]   8/27/2019  9:30 AM CARDIAC, PET IMAGING [facility] CARDPET [facility]   8/27/2019 10:15 AM ECHO, [facility] ECHOLAB [facility]   8/27/2019 11:20 AM Alonso Storey MD [facility] CARDIO [facility]   8/29/2019  9:30 AM LAB, [place] [facility] [place]   8/29/2019  9:45 AM CONY JENNINGS [facility] [place]   8/29/2019 10:00 AM NURSE, [facility] INT MED DARYL 100 [facility] ODALYS CAR [place]   9/3/2019  9:30 AM Beverly Muniz MD [facility] ODALYS [place]          Medication List           Accurate as of 7/23/19  1:31 PM. If you have any questions, ask your nurse or doctor.               START taking these medications    insulin glargine 100 unit/mL injection  Commonly known as:  LANTUS U-100 INSULIN  Inject 10 Units into the skin every evening.  Started by:  MARISELA Alejandra        CHANGE how you take these medications    furosemide 40 MG tablet  Commonly " "known as:  LASIX  Take 1 tablet (40 mg total) by mouth 2 (two) times daily.  What changed:  when to take this  Changed by:  MARISELA Alejandra        CONTINUE taking these medications    acetaminophen 325 MG tablet  Commonly known as:  TYLENOL  Take 2 tablets (650 mg total) by mouth every 6 (six) hours as needed for Pain.     * albuterol 2.5 mg /3 mL (0.083 %) nebulizer solution  Commonly known as:  PROVENTIL  Take 3 mLs (2.5 mg total) by nebulization every 6 (six) hours as needed for Wheezing. Rescue     * albuterol 90 mcg/actuation inhaler  Commonly known as:  VENTOLIN HFA  Inhale 2 puffs into the lungs every 6 (six) hours as needed for Wheezing. Rescue     ALPRAZolam 0.5 MG tablet  Commonly known as:  XANAX  1 tab Every 8 hours as needed for anxiety     aspirin 81 MG EC tablet  Commonly known as:  ECOTRIN     atorvastatin 40 MG tablet  Commonly known as:  LIPITOR  TAKE 1 TABLET ONE TIME DAILY FOR CHOLESTEROL     BD ULTRA-FINE SHORT PEN NEEDLE 31 gauge x 5/16" Ndle  Generic drug:  pen needle, diabetic  USE TO INJECT NOVOLOG FLEXPEN BEFORE MEALS     blood sugar diagnostic Strp  Commonly known as:  ACCU-CHEK SMARTVIEW TEST STRIP  1 strip by Misc.(Non-Drug; Combo Route) route 2 (two) times daily.     buPROPion 300 MG 24 hr tablet  Commonly known as:  WELLBUTRIN XL  Take 1 tablet (300 mg total) by mouth once daily.     * DULCOLAX (BISACODYL) ORAL     * bisacodyl 10 mg Supp  Commonly known as:  DULCOLAX  Place 1 suppository (10 mg total) rectally daily as needed.     ergocalciferol 50,000 unit Cap  Commonly known as:  ERGOCALCIFEROL  Take 1 capsule (50,000 Units total) by mouth every 7 days.     famotidine 20 MG tablet  Commonly known as:  PEPCID  Take 1 tablet (20 mg total) by mouth once daily.     flash glucose scanning reader Misc  Commonly known as:  Refresh.ioSTYLE JOSÉ LUIS 14 DAY READER  Use as directed. Scan 4 times a day.     flash glucose sensor Kit  Commonly known as:  FREESTYLE JOSÉ LUIS 14 DAY SENSOR  Change " every 14 days.     folic acid 1 MG tablet  Commonly known as:  FOLVITE  Take 1 tablet (1 mg total) by mouth once daily.     levETIRAcetam 500 MG Tab  Commonly known as:  KEPPRA  Take 1 tablet (500 mg total) by mouth every 8 (eight) hours.     lidocaine-prilocaine cream  Commonly known as:  EMLA  Apply topically as needed.     polyethylene glycol 17 gram/dose powder  Commonly known as:  MIRALAX  Take 17 g by mouth 2 (two) times daily.     pregabalin 25 MG capsule  Commonly known as:  LYRICA  Take 1 capsule (25 mg total) by mouth once daily. May take additional dose after HD.     sevelamer carbonate 800 mg Tab  Commonly known as:  RENVELA     traMADol 50 mg tablet  Commonly known as:  ULTRAM  1 tab Q6-8 hours as needed for joint pain         * This list has 4 medication(s) that are the same as other medications prescribed for you. Read the directions carefully, and ask your doctor or other care provider to review them with you.            STOP taking these medications    ciprofloxacin HCl 0.3 % ophthalmic solution  Commonly known as:  CILOXAN  Stopped by:  MARISELA Alejandra     guaiFENesin 1,200 mg Ta12  Commonly known as:  MUCINEX  Stopped by:  MARISELA Alejandra     senna-docusate 8.6-50 mg 8.6-50 mg per tablet  Commonly known as:  PERICOLACE  Stopped by:  MARISELA Alejandra           Where to Get Your Medications      These medications were sent to [facility] [address] [place] [address] [address] [facility]  [address] [facility] [address]    Phone:  [phone]   · bisacodyl 10 mg Supp  · polyethylene glycol 17 gram/dose powder     Information about where to get these medications is not yet available    Ask your nurse or doctor about these medications  · furosemide 40 MG tablet  · insulin glargine 100 unit/mL injection         Signing Physician:  MARISELA Alejandra

## 2019-07-23 ENCOUNTER — TELEPHONE (OUTPATIENT)
Dept: INTERNAL MEDICINE | Facility: CLINIC | Age: 59
End: 2019-07-23

## 2019-07-23 ENCOUNTER — OFFICE VISIT (OUTPATIENT)
Dept: NEUROLOGY | Facility: CLINIC | Age: 59
End: 2019-07-23
Payer: MEDICARE

## 2019-07-23 ENCOUNTER — LAB VISIT (OUTPATIENT)
Dept: LAB | Facility: HOSPITAL | Age: 59
End: 2019-07-23
Payer: MEDICARE

## 2019-07-23 ENCOUNTER — OFFICE VISIT (OUTPATIENT)
Dept: PRIMARY CARE CLINIC | Facility: CLINIC | Age: 59
End: 2019-07-23
Payer: MEDICARE

## 2019-07-23 VITALS
WEIGHT: 183 LBS | HEIGHT: 63 IN | BODY MASS INDEX: 32.43 KG/M2 | SYSTOLIC BLOOD PRESSURE: 125 MMHG | DIASTOLIC BLOOD PRESSURE: 75 MMHG

## 2019-07-23 VITALS
SYSTOLIC BLOOD PRESSURE: 99 MMHG | BODY MASS INDEX: 32.41 KG/M2 | DIASTOLIC BLOOD PRESSURE: 77 MMHG | HEIGHT: 63 IN | HEART RATE: 88 BPM

## 2019-07-23 DIAGNOSIS — G47.33 OSA (OBSTRUCTIVE SLEEP APNEA): ICD-10-CM

## 2019-07-23 DIAGNOSIS — I10 ESSENTIAL HYPERTENSION: Primary | ICD-10-CM

## 2019-07-23 DIAGNOSIS — N18.6 ESRD (END STAGE RENAL DISEASE) ON DIALYSIS: ICD-10-CM

## 2019-07-23 DIAGNOSIS — E78.5 HYPERLIPIDEMIA, UNSPECIFIED HYPERLIPIDEMIA TYPE: ICD-10-CM

## 2019-07-23 DIAGNOSIS — Z99.2 TYPE 2 DIABETES MELLITUS WITH CHRONIC KIDNEY DISEASE ON CHRONIC DIALYSIS, WITHOUT LONG-TERM CURRENT USE OF INSULIN: ICD-10-CM

## 2019-07-23 DIAGNOSIS — E11.65 UNCONTROLLED TYPE 2 DIABETES MELLITUS WITH HYPERGLYCEMIA: Primary | ICD-10-CM

## 2019-07-23 DIAGNOSIS — G40.209 PARTIAL SYMPTOMATIC EPILEPSY WITH COMPLEX PARTIAL SEIZURES, NOT INTRACTABLE, WITHOUT STATUS EPILEPTICUS: ICD-10-CM

## 2019-07-23 DIAGNOSIS — I50.32 CHRONIC DIASTOLIC CHF (CONGESTIVE HEART FAILURE): ICD-10-CM

## 2019-07-23 DIAGNOSIS — G40.209 PARTIAL SYMPTOMATIC EPILEPSY WITH COMPLEX PARTIAL SEIZURES, NOT INTRACTABLE, WITHOUT STATUS EPILEPTICUS: Primary | ICD-10-CM

## 2019-07-23 DIAGNOSIS — H10.9 CONJUNCTIVITIS, UNSPECIFIED CONJUNCTIVITIS TYPE, UNSPECIFIED LATERALITY: ICD-10-CM

## 2019-07-23 DIAGNOSIS — E11.22 TYPE 2 DIABETES MELLITUS WITH CHRONIC KIDNEY DISEASE ON CHRONIC DIALYSIS, WITHOUT LONG-TERM CURRENT USE OF INSULIN: ICD-10-CM

## 2019-07-23 DIAGNOSIS — N17.9 AKI (ACUTE KIDNEY INJURY): ICD-10-CM

## 2019-07-23 DIAGNOSIS — Z99.2 ESRD (END STAGE RENAL DISEASE) ON DIALYSIS: ICD-10-CM

## 2019-07-23 DIAGNOSIS — N18.6 TYPE 2 DIABETES MELLITUS WITH CHRONIC KIDNEY DISEASE ON CHRONIC DIALYSIS, WITHOUT LONG-TERM CURRENT USE OF INSULIN: ICD-10-CM

## 2019-07-23 DIAGNOSIS — I27.20 PULMONARY HYPERTENSION: ICD-10-CM

## 2019-07-23 LAB
ALBUMIN SERPL BCP-MCNC: 3.8 G/DL (ref 3.5–5.2)
ANION GAP SERPL CALC-SCNC: 14 MMOL/L (ref 8–16)
BUN SERPL-MCNC: 44 MG/DL (ref 6–20)
CALCIUM SERPL-MCNC: 10 MG/DL (ref 8.7–10.5)
CHLORIDE SERPL-SCNC: 91 MMOL/L (ref 95–110)
CO2 SERPL-SCNC: 33 MMOL/L (ref 23–29)
CREAT SERPL-MCNC: 6.4 MG/DL (ref 0.5–1.4)
EST. GFR  (AFRICAN AMERICAN): 7.5 ML/MIN/1.73 M^2
EST. GFR  (NON AFRICAN AMERICAN): 6.5 ML/MIN/1.73 M^2
GLUCOSE SERPL-MCNC: 463 MG/DL (ref 70–110)
PHOSPHATE SERPL-MCNC: 3.9 MG/DL (ref 2.7–4.5)
POTASSIUM SERPL-SCNC: 4.1 MMOL/L (ref 3.5–5.1)
SODIUM SERPL-SCNC: 138 MMOL/L (ref 136–145)

## 2019-07-23 PROCEDURE — 3008F PR BODY MASS INDEX (BMI) DOCUMENTED: ICD-10-PCS | Mod: HCNC,CPTII,NTX,S$GLB | Performed by: PSYCHIATRY & NEUROLOGY

## 2019-07-23 PROCEDURE — 99495 TCM SERVICES (MODERATE COMPLEXITY): ICD-10-PCS | Mod: S$GLB,,, | Performed by: NURSE PRACTITIONER

## 2019-07-23 PROCEDURE — 99999 PR PBB SHADOW E&M-EST. PATIENT-LVL V: ICD-10-PCS | Mod: PBBFAC,HCNC,, | Performed by: NURSE PRACTITIONER

## 2019-07-23 PROCEDURE — 99999 PR PBB SHADOW E&M-EST. PATIENT-LVL V: CPT | Mod: PBBFAC,HCNC,, | Performed by: NURSE PRACTITIONER

## 2019-07-23 PROCEDURE — 99499 RISK ADDL DX/OHS AUDIT: ICD-10-PCS | Mod: HCNC,S$GLB,, | Performed by: NURSE PRACTITIONER

## 2019-07-23 PROCEDURE — 99495 TRANSJ CARE MGMT MOD F2F 14D: CPT | Mod: S$GLB,,, | Performed by: NURSE PRACTITIONER

## 2019-07-23 PROCEDURE — 36415 COLL VENOUS BLD VENIPUNCTURE: CPT | Mod: HCNC,TXP

## 2019-07-23 PROCEDURE — 99999 PR PBB SHADOW E&M-EST. PATIENT-LVL II: ICD-10-PCS | Mod: PBBFAC,HCNC,TXP, | Performed by: PSYCHIATRY & NEUROLOGY

## 2019-07-23 PROCEDURE — 99215 OFFICE O/P EST HI 40 MIN: CPT | Mod: HCNC,NTX,S$GLB, | Performed by: PSYCHIATRY & NEUROLOGY

## 2019-07-23 PROCEDURE — 99215 PR OFFICE/OUTPT VISIT, EST, LEVL V, 40-54 MIN: ICD-10-PCS | Mod: HCNC,NTX,S$GLB, | Performed by: PSYCHIATRY & NEUROLOGY

## 2019-07-23 PROCEDURE — 3008F BODY MASS INDEX DOCD: CPT | Mod: HCNC,CPTII,NTX,S$GLB | Performed by: PSYCHIATRY & NEUROLOGY

## 2019-07-23 PROCEDURE — 99499 UNLISTED E&M SERVICE: CPT | Mod: HCNC,S$GLB,, | Performed by: NURSE PRACTITIONER

## 2019-07-23 PROCEDURE — 80069 RENAL FUNCTION PANEL: CPT | Mod: HCNC,NTX

## 2019-07-23 PROCEDURE — 99999 PR PBB SHADOW E&M-EST. PATIENT-LVL II: CPT | Mod: PBBFAC,HCNC,TXP, | Performed by: PSYCHIATRY & NEUROLOGY

## 2019-07-23 RX ORDER — POLYETHYLENE GLYCOL 3350 17 G/17G
17 POWDER, FOR SOLUTION ORAL 2 TIMES DAILY
Qty: 1 BOTTLE | Refills: 6 | Status: SHIPPED | OUTPATIENT
Start: 2019-07-23

## 2019-07-23 RX ORDER — BISACODYL 10 MG
10 SUPPOSITORY, RECTAL RECTAL DAILY PRN
Qty: 30 SUPPOSITORY | Refills: 3 | COMMUNITY
Start: 2019-07-23 | End: 2019-07-23 | Stop reason: SDUPTHER

## 2019-07-23 RX ORDER — BISACODYL 10 MG
10 SUPPOSITORY, RECTAL RECTAL DAILY PRN
Qty: 30 SUPPOSITORY | Refills: 3 | Status: SHIPPED | OUTPATIENT
Start: 2019-07-23

## 2019-07-23 RX ORDER — FUROSEMIDE 40 MG/1
40 TABLET ORAL 2 TIMES DAILY
Qty: 90 TABLET | Refills: 2 | Status: ON HOLD
Start: 2019-07-23 | End: 2019-10-08 | Stop reason: HOSPADM

## 2019-07-23 RX ORDER — INSULIN GLARGINE 100 [IU]/ML
10 INJECTION, SOLUTION SUBCUTANEOUS NIGHTLY
Qty: 3 ML | Refills: 0
Start: 2019-07-23 | End: 2019-09-17

## 2019-07-23 NOTE — TELEPHONE ENCOUNTER
Spoke with Reina ruiz her Mom's Glucose today at 463 on lab.  A recheck accucheck at home is 400, so it is accurate.  I have recommend she purchase Relion Humulin Regular insulin at [facility] now and give her mom 10 units.  She will call with recheck accucheck about an hour after giving regular insulin.  Home accuchecks have been <200 per daughter.  'Will increase the Lantus to 15 units QPM and follow with plan to go up to 20 if needed.  David Paul

## 2019-07-23 NOTE — LETTER
July 25, 2019      Beverly Muniz MD  [address]  [facility] [address]           [facility] - Neurology Epilepsy  [address]  [facility] [address]  Phone: [phone]  Fax: [phone]          Patient: Lucy Perez   MR Number: [id number]   YOB: 1960   Date of Visit: 7/23/2019       Dear Dr. Beverly Muniz:    Thank you for referring Lucy Perez to me for evaluation. Attached you will find relevant portions of my assessment and plan of care.    If you have questions, please do not hesitate to call me. I look forward to following Lucy Perez along with you.    Sincerely,    Abisai Mcintosh MD    Enclosure  CC:  No Recipients    If you would like to receive this communication electronically, please contact [email or web address] or [phone] to request more information on [employer] Link access.    For providers and/or their staff who would like to refer a patient to [facility], please contact us through our one-stop-shop provider referral line, [facility], at [phone].    If you feel you have received this communication in error or would no longer like to receive these types of communications, please e-mail [email or web address]

## 2019-07-23 NOTE — PROGRESS NOTES
Name: Lucy Perez  MRN: [id number]   [facility]: [id number]      Date: 07/23/2019    HISTORY OF PRESENT ILLNESS (HPI)  The patient is a 58 y.o. BF  The patient was initially referred for consultation by   The patient was accompanied today by family members who provided some of the history.      DATE OF SERVICE:  06/07/2017     ICU EEG/VIDEO MONITORING REPORT     METHODOLOGY:  Electroencephalographic (EEG) is recorded with electrodes placed   according to the International 10-20 placement system.  Thirty two (32) channels   of digital signal (sampling rate of 512/sec), including T1 and T2, were   simultaneously recorded from the scalp and may include EKG, EMG, and/or eye   monitors.  Recording band pass was 0.1 to 512 Hz.  Digital video recording of   the patient is simultaneously recorded with the EEG.  The patient is instructed   to report clinical symptoms which may occur during the recording session.  EEG   and video recording are stored and archived in digital format.  Activation   procedures, which include photic stimulation, hyperventilation and instructing   patients to perform simple tasks, are done in selected patients.     The EEG is displayed on a monitor screen and can be reviewed using different   montages.  Computer-assisted analysis is employed to detect spike and   electrographic seizure activity.   The entire record is submitted for computer   analysis.  The entire recording is visually reviewed, and the times identified   by computer analysis as being spikes or seizures are reviewed again.       Compressed spectral analysis (CSA) is also performed on the activity recorded   from each individual channel.  This is displayed as a power display of   frequencies from 0 to 30 Hz over time.   The CSA is reviewed looking for   asymmetries in power between homologous areas of the scalp, then compared with   the original EEG recording.       [employer] software was also utilized in the review of this study.   This software   suite analyzes the EEG recording in multiple domains.  Coherence and rhythmicity   are computed to identify EEG sections which may contain organized seizures.    Each channel undergoes analysis to detect the presence of spike and sharp waves   which have special and morphological characteristics of epileptic activity.  The   routine EEG recording is converted from special into frequency domain.  This is   then displayed comparing homologous areas to identify areas of significant   asymmetry.  Algorithm to identify non-cortically generated artifact is used to   separate artifact from the EEG.       RECORDING TIMES:  Duration of this portion of the study is 26 hours and 40 minutes.  Study begins on 06/07/2017 at 0024 and pauses on 06/07/2017 from 08:19 to 11:56   and then resumes and concludes on 06/08/2017 at 0700.  The first portion of the   study is done with electrode cap and is of marginal technical quality.     FINDINGS:  The electrode cap portion of this study reveals a diffusely slow   background with EMG artifact dominating the left hemisphere in particular and   the overall background consisting of predominantly delta activity at times and   light burst-suppression pattern with periodic epileptiform discharges seen in   the bursts that are right hemispheric dominant.  The burst-suppression ratio is   approximately 1 to 5.  When the discharges are present at other times, diffuse   slowing only is seen.  There were no rhythmic runs or seizure activity observed   during the electrode cap portion.     Starting at 11:56 on June 7th, the full look up commences with a pattern very   similar to what was seen in the electrode cap with left hemispheric EMG artifact   being present and right hemispheric epileptiform discharges seen at times in a   PLED like formation with discharges every few seconds.  There is an element of   burst suppression on the majority of the time to the EEG.  On a few  occasions,   the PLEDs become somewhat rhythmic for brief periods and clearly possess   epileptogenic potential.  These rhythmic periods are only during times of   maximum alertness and stimulation.  The majority of the time the   burst-suppression pattern with the PLEDs predominates.  There is no major   evolution from the beginning to the end of the study and at the conclusion,   diffuse slowing with right hemispheric PLED seen every 3 to 5 seconds is   present.     INTERPRETATION:  Abnormal EEG due to the presence of moderately severe diffuse   slowing and right hemispheric periodic and intermittent epileptiform discharges   seen as described above.  No overt seizure activity is seen, although high   epileptogenic potential is seen in these discharges, which are present   throughout most of this recording.  Results were communicated to Critical Care   Team in real time.        LUÍS/ANDRAE  dd: 06/08/2017 11:40:00 (CDT)  td: 06/08/2017 21:30:30 (CDT)  Doc ID   #[id number]  Job ID #[id number]     CC:          Document Information      Procedure Notes     Author Status Last  Updated Created   Abisai Mcintosh MD Signed Abisai Mcintosh MD 6/28/2017  2:41 PM 6/8/2017  9:30 PM          Assoc. Orders   EEG EXTENDED MONITORING GREATER THAN ONE HOUR         DATE OF SERVICE:  06/07/2017     ICU EEG/VIDEO MONITORING REPORT     METHODOLOGY:  Electroencephalographic (EEG) is recorded with electrodes placed   according to the International 10-20 placement system.  Thirty two (32) channels   of digital signal (sampling rate of 512/sec), including T1 and T2, were   simultaneously recorded from the scalp and may include EKG, EMG, and/or eye   monitors.  Recording band pass was 0.1 to 512 Hz.  Digital video recording of   the patient is simultaneously recorded with the EEG.  The patient is instructed   to report clinical symptoms which may occur during the recording session.  EEG   and video recording are stored and archived in digital  format.  Activation   procedures, which include photic stimulation, hyperventilation and instructing   patients to perform simple tasks, are done in selected patients.     The EEG is displayed on a monitor screen and can be reviewed using different   montages.  Computer-assisted analysis is employed to detect spike and   electrographic seizure activity.   The entire record is submitted for computer   analysis.  The entire recording is visually reviewed, and the times identified   by computer analysis as being spikes or seizures are reviewed again.       Compressed spectral analysis (CSA) is also performed on the activity recorded   from each individual channel.  This is displayed as a power display of   frequencies from 0 to 30 Hz over time.   The CSA is reviewed looking for   asymmetries in power between homologous areas of the scalp, then compared with   the original EEG recording.       [employer] software was also utilized in the review of this study.  This software   suite analyzes the EEG recording in multiple domains.  Coherence and rhythmicity   are computed to identify EEG sections which may contain organized seizures.    Each channel undergoes analysis to detect the presence of spike and sharp waves   which have special and morphological characteristics of epileptic activity.  The   routine EEG recording is converted from special into frequency domain.  This is   then displayed comparing homologous areas to identify areas of significant   asymmetry.  Algorithm to identify non-cortically generated artifact is used to   separate artifact from the EEG.       RECORDING TIMES:  Duration of this portion of the study is 26 hours and 40 minutes.  Study begins on 06/07/2017 at 0024 and pauses on 06/07/2017 from 08:19 to 11:56   and then resumes and concludes on 06/08/2017 at 0700.  The first portion of the   study is done with electrode cap and is of marginal technical quality.     FINDINGS:  The electrode cap portion  of this study reveals a diffusely slow   background with EMG artifact dominating the left hemisphere in particular and   the overall background consisting of predominantly delta activity at times and   light burst-suppression pattern with periodic epileptiform discharges seen in   the bursts that are right hemispheric dominant.  The burst-suppression ratio is   approximately 1 to 5.  When the discharges are present at other times, diffuse   slowing only is seen.  There were no rhythmic runs or seizure activity observed   during the electrode cap portion.     Starting at 11:56 on June 7th, the full look up commences with a pattern very   similar to what was seen in the electrode cap with left hemispheric EMG artifact   being present and right hemispheric epileptiform discharges seen at times in a   PLED like formation with discharges every few seconds.  There is an element of   burst suppression on the majority of the time to the EEG.  On a few occasions,   the PLEDs become somewhat rhythmic for brief periods and clearly possess   epileptogenic potential.  These rhythmic periods are only during times of   maximum alertness and stimulation.  The majority of the time the   burst-suppression pattern with the PLEDs predominates.  There is no major   evolution from the beginning to the end of the study and at the conclusion,   diffuse slowing with right hemispheric PLED seen every 3 to 5 seconds is   present.     INTERPRETATION:  Abnormal EEG due to the presence of moderately severe diffuse   slowing and right hemispheric periodic and intermittent epileptiform discharges   seen as described above.  No overt seizure activity is seen, although high   epileptogenic potential is seen in these discharges, which are present   throughout most of this recording.  Results were communicated to Critical Care   Team in real time.        KIM  dd: 06/08/2017 11:40:00 (CDT)  td: 06/08/2017 21:30:30 (CDT)  Doc ID   #[id number]  Job ID  #[id number]        Ms. Perez [facility] for management today.  This is a   patient for whom I read a continuous EEG when admitted to ICU back in June of 2017 with the result appended above.  At that time, the patient had a study,   which contained epileptiform discharges, but no seizures were recorded.  The   patient's history includes a hemorrhagic stroke from brain aneurysm back in   2016, which resulted in intermittent seizures.  She has been controlled for the   most part since then with Keppra 500 mg t.i.d.  Last week reportedly, the   patient had her first seizure in over a year, which lasted approximately 2   minutes and consisted her eyes rolling back and a generalized stiffening of her   body without loss of posture.  There was no significant postictal period.  There   were mitigating circumstances that day and missed dose of medication and stress   seem to play a part.  This is the only seizure, which has occurred recently and   she generally has done well on the Keppra.    Of note, the patient takes Wellbutrin for depression, which was recently   increased from 150 mg a day to 300 mg per day.  This medication is known to   lower the seizure threshold, particularly doses at or near 300 mg a day or   above.  This may have been a factor in the recent breakthrough seizure.  The   patient also takes tramadol periodically for pain when at dialysis, which is   also known to lower the seizure threshold.  There are no other reports of recent   seizure or seizure-like activity.      NBB/IN  dd: 07/25/2019 14:36:16 (CDT)  td: 07/25/2019 14:46:59 (CDT)  Doc ID   #[id number]  Job ID #[id number]    CC:       [id number]            Seizure Triggers  Sleep Deprivation - None  Other medications - None  Psych/stress - None  Photic stimulation - None  Hyperventilation - None  Medical Problems - None  Menses - No  Sensory Stimulation (light, sound, etc) - None  Missed dose of meds - None    AED Treatments  Present  regimen  levetiracetam (Keppra, LEV) 500mg TID    Prior treatments      Not tried  acetazolamide (Diamox, AZM)  amantadine  carbamazepine (Tegretol, CBZ)  clobezam (Onfi or Frizium, CLB)  ethosuximide (Zarontin, ESM)  eslicarbazine (Aptiom, ESL)  felbamate (felbatol, FBM)  gabapentin (Neurontin, GBP)  lacosamide (Vimpat, LCM)   lamotrigine (Lamictal, LTG)   methsuximide (Celontin, MSM)  methyphenytoin (Mesantion, MHT)  oxcarbazepine (Trileptal OXC)  perampanel (Fycompa, FCP)   phenobarbital (Pb)  phenytoin (Dilantin, PHT)  pregabalin (Lyrica, PGB)  primidone (Mysoline, PRM)  retigabine (Potiga, RTG)  rufinamide (Banzel, RUF)  tiagabine (Gabatril,  TGB)  topiramate (Topamax, TPM)  viagabatrin, (Sabril, VGB)  vagal nerve stimulator (VNS)  valproic acid (Depakote, VPA)  zonisamide (Zonegran, ZNS)  Benzodiazepines  diazepam - rectal (Diastatl)  diazepam - oral (Valium, DZ)  clonazepam (Klonopin, CZP)  clorazepate (Tranxene, CLZ)  Ativan  Brain Stimulation  Vagal Nerve Stimulation-n/a  DBS- n/a    Compliance method  Memory - yes  Mom or Spouse - Yes  Pill Box - no  Rogelio calendar - no  Turn over medication bottle - no  Phone alarm - no    Seizure Evaluation  EEG Routine -   EEG Ambulatory -   EEG\Video Monitoring -   MRI/MRA -   CT/CTA Scan -   PET Scan -   Neuropsychological evaluation -   DEXA Scan    Potential Epilepsy Risk Factors:   Pregnancy/Labor/Delivery - full term uncomplicated pregnancy labor and vaginal delivery  Febrile seizures - none  Head injury  - none  CNS infection - none     Stroke - none  Family Hx of Sz - none    PAST MEDICAL HISTORY:   Active Ambulatory Problems     Diagnosis Date Noted    Hyperlipidemia 03/05/2013    Mixed anxiety and depressive disorder 03/05/2013    THERESA (obstructive sleep apnea) 03/05/2013    Sarcoidosis 11/26/2013    Chronic diastolic CHF (congestive heart failure) 12/15/2015    Pulmonary hypertension 12/15/2015    Left spastic hemiparesis 10/22/2016    Central pain  syndrome 10/22/2016    Partial symptomatic epilepsy with complex partial seizures, not intractable, without status epilepticus 06/06/2017    Slow transit constipation 06/16/2017    Anemia in ESRD (end-stage renal disease) 08/02/2017    Essential hypertension 08/10/2017    Folic acid deficiency 10/09/2017    Screening for colorectal cancer 12/06/2017    LEFT Hemiplegia as late effect of stroke 01/03/2018    Vitamin D deficiency 02/26/2018    Hypertensive urgency 08/10/2018    Type 2 diabetes mellitus with chronic kidney disease on chronic dialysis, without long-term current use of insulin 08/10/2018    Fatigue     ESRD (end stage renal disease) on dialysis 08/11/2018    Gall stones 09/20/2018    Rheumatoid arthritis involving multiple sites 09/20/2018    Coronary angioplasty status 09/20/2018    Positive depression screening 01/11/2019    Atherosclerosis of aorta 01/11/2019    Thrombosis of arteriovenous graft 06/20/2019    Swelling of right upper extremity 06/21/2019    Hematuria 06/21/2019    Cholelithiasis 06/22/2019    Problem with dialysis access     Hernia of abdominal wall 07/05/2019     Resolved Ambulatory Problems     Diagnosis Date Noted    Renovascular hypertension 03/05/2013    Type II or unspecified type diabetes mellitus with neurological manifestations, uncontrolled(250.62) 03/05/2013    PUD (peptic ulcer disease) 03/05/2013    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    Vertebral artery stenosis 03/05/2013    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    Degeneration of lumbar or lumbosacral intervertebral disc 03/05/2013    S/P breast biopsy, left 03/05/2013    Type II or unspecified type diabetes mellitus with renal manifestations, uncontrolled(250.42) 03/25/2013    Peripheral neuropathy 03/25/2013    Obesity 03/25/2013    S/P total hysterectomy 07/10/2013    Right shoulder pain 06/17/2014    Rotator cuff tear 07/02/2014    Arthritis of right acromioclavicular joint 07/02/2014    Biceps  tendonitis 07/02/2014    Impingement syndrome of right shoulder 07/02/2014    S/P R shoulder surgery, SAD, DCE, biceps tenotomy 07/15/2014    Diabetes mellitus with renal complications 03/11/2015    Sebaceous cyst 03/17/2015    Screening for colon cancer 08/05/2015    Dyspnea 09/16/2015    Type 2 diabetes mellitus with stage 3 chronic kidney disease, without long-term current use of insulin 10/22/2015    Type 2 diabetes mellitus with diabetic polyneuropathy, without long-term current use of insulin 10/22/2015    Shortness of breath 02/28/2016    CAP (community acquired pneumonia) 02/28/2016    Pulmonary edema 02/28/2016    Hyperkalemia 02/29/2016    Productive cough 02/29/2016    Hypertensive retinopathy of both eyes 07/26/2016    Cortical cataract of both eyes 07/26/2016    Screening 08/11/2016    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    Hypertensive emergency 10/28/2016    Altered mental status     Acute respiratory failure with hypoxia 11/07/2016    Acute on chronic diastolic heart failure 11/07/2016    DARLING (acute kidney injury) 11/09/2016    Hypertensive crisis 06/07/2017    Raul coma scale total score 9-12 06/20/2017    Anemia of chronic disease 06/20/2017    Seizure disorder 06/20/2017    Intertrigo 06/16/2017    Acute cystitis 06/20/2017    PEG (percutaneous endoscopic gastrostomy) status 06/28/2017    Acute kidney injury (nontraumatic) 08/02/2017    Thrombocytopenia 09/18/2017    New daily persistent headache 10/09/2017    Shortness of breath 02/21/2018    Wheezing 02/21/2018    Pneumonia due to infectious organism 02/21/2018    Hypoxia 02/23/2018    Uremia 08/09/2018    Hyperkalemia 08/10/2018    Soft tissue swelling 09/20/2018    Pre-op evaluation     Hyperkalemia 06/21/2019    Right lower quadrant abdominal pain 07/06/2019    AV graft malfunction 07/06/2019     Past Medical History:   Diagnosis Date    Anemia of chronic  disease 6/10/2017    Anxiety     Arthritis of right acromioclavicular joint 7/2/2014    Asthma     Bipolar disorder     Bronchitis, acute     Cataract     Cholelithiasis     Chronic diastolic CHF (congestive heart failure)     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    Cortical cataract of both eyes 7/26/2016    Decubitus ulcer of buttock, stage 2     Degeneration of lumbar or lumbosacral intervertebral disc 3/5/2013    Depression     Encounter for blood transfusion     ESRD on hemodialysis     General anesthetics causing adverse effect in therapeutic use     Hemorrhagic cerebrovascular accident (CVA)     Hemorrhagic cerebrovascular accident (CVA) 1/3/2018    History of stroke 6/28/2017    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    Hyperlipidemia     Hypertensive retinopathy of both eyes 7/26/2016    Impingement syndrome of right shoulder 7/2/2014    Left ventricular diastolic dysfunction with preserved systolic function 12/15/2015    Mixed anxiety and depressive disorder 3/5/2013    Obesity     THERESA (obstructive sleep apnea) 3/5/2013    Partial symptomatic epilepsy with complex partial seizures, not intractable, without status epilepticus     PEG (percutaneous endoscopic gastrostomy) status 6/28/2017    Renovascular hypertension 3/5/2013    Rheumatoid arthritis(714.0)     Rotator cuff tear 7/2/2014    S/P breast biopsy, left 3/5/2013    S/P R shoulder surgery, SAD, DCE, biceps tenotomy 7/15/2014    S/P total hysterectomy 7/10/2013    Sarcoidosis     Stroke 2016    Type 2 diabetes mellitus with both eyes affected by moderate nonproliferative retinopathy without macular edema, without long-term current use of insulin 8/2/2017    Type 2 diabetes mellitus with diabetic polyneuropathy, without long-term current use of insulin 10/22/2015    Type 2 diabetes mellitus with stage 3 chronic kidney disease, without long-term current use of insulin 10/22/2015     Vertebral artery stenosis 3/5/2013        PAST SURGICAL HISTORY:   Past Surgical History:   Procedure Laterality Date    ARTHROSCOPY-SHOULDER WITH SUBACROMIAL DECOMPRESSION Right 7/9/2014    Performed by Kendall Pantoja MD at [facility], [place], operated by [facility] OR    AV GRAFT CREATION Right 10/18/2018    Performed by Meir Valencia [facility] at [facility] OR 2ND FLR    Biceps Tenotomy Right 7/9/2014    Performed by Kendall Pantoja MD at [facility], [place], operated by [facility] OR    BREAST SURGERY      breast reduction    COLONOSCOPY N/A 8/11/2016    Performed by Jerry Vilchis MD at [facility] ENDO (4TH FLR)    DEBRIDEMENT-SHOULDER-ARTHROSCOPIC Labral Cuff Right 7/9/2014    Performed by Kendall Pantoja MD at [facility] [place], operated by [facility] OR    DECLOT-GRAFT Right 6/20/2019    Performed by Cabrera Irwin MD at [facility] CATH LAB    ESOPHAGOGASTRODUODENOSCOPY (EGD) N/A 12/6/2017    Performed by Dallas Lock Jr., [facility] at [facility] ENDO    WJGKVYVE-JHNLWOZE-QRBDFI END Right 7/9/2014    Performed by Kendall Pantoja MD at [facility], [place], operated by [facility] OR    Fistulogram Right 7/8/2019    Performed by WELLINGTON Palm III, MD at [facility] CATH LAB    Fistulogram Right 2/11/2019    Performed by Meir Valencia MD at [facility] CATH LAB    HYSTERECTOMY      PTA, Fistula  7/8/2019    Performed by WELLINGTON Palm III, MD at [facility] CATH LAB    ROTATOR CUFF REPAIR Right July 9, 2014    right side    Skull surgery      Aneurysm    stent placed      in vertebral artery    Stent, Fistula  7/8/2019    Performed by WELLINGTON Palm III, MD at [facility] CATH LAB    TOTAL REDUCTION MAMMOPLASTY      TUBAL LIGATION          FAMILY HISTORY:   Family History   Problem Relation Age of Onset    Cancer Mother 55        Breast cancer    Diabetes Mother     Hypertension Mother     Heart disease Mother     Heart attack Mother     Breast cancer Mother     Stroke Sister     Hypertension Sister     Sleep apnea Sister     No Known Problems Daughter     Diabetes Son         controlled    Bell's palsy Sister     Lupus Sister     Blindness Maternal  "Grandmother     Diabetes Unknown         "My entire family family has diabetes"    Stroke Maternal Aunt     Amblyopia Neg Hx     Cataracts Neg Hx     Glaucoma Neg Hx     Macular degeneration Neg Hx     Retinal detachment Neg Hx     Strabismus Neg Hx          SOCIAL HISTORY:   Social History     Socioeconomic History    Marital status:      Spouse name: Not on file    Number of children: Not on file    Years of education: Not on file    Highest education level: Not on file   Occupational History    Not on file   Social Needs    Financial resource strain: Not on file    Food insecurity:     Worry: Not on file     Inability: Not on file    Transportation needs:     Medical: Not on file     Non-medical: Not on file   Tobacco Use    Smoking status: Never Smoker    Smokeless tobacco: Never Used   Substance and Sexual Activity    Alcohol use: Not Currently     Comment: occasional wine cooler     Drug use: No    Sexual activity: Yes     Partners: Male   Lifestyle    Physical activity:     Days per week: Not on file     Minutes per session: Not on file    Stress: Not on file   Relationships    Social connections:     Talks on phone: Not on file     Gets together: Not on file     Attends Christian service: Not on file     Active member of club or organization: Not on file     Attends meetings of clubs or organizations: Not on file     Relationship status: Not on file   Other Topics Concern    Not on file   Social History Narrative    . 2 children(Wilder and Reina). Does not work. Previously worked as a .         SUBSTANCE USE:  Social History     Tobacco Use    Smoking status: Never Smoker    Smokeless tobacco: Never Used   Substance and Sexual Activity    Alcohol use: Not Currently     Comment: occasional wine cooler     Drug use: No    Sexual activity: Yes     Partners: Male      Social History     Tobacco Use    Smoking status: Never Smoker    Smokeless " tobacco: Never Used   Substance Use Topics    Alcohol use: Not Currently     Comment: occasional wine cooler         ALLERGIES: Lisinopril and Vicodin [hydrocodone-acetaminophen]       Review of Systems   Constitutional: Negative for chills, diaphoresis, fever, malaise/fatigue and weight loss.   HENT: Negative for ear pain, hearing loss, nosebleeds and tinnitus.    Eyes: Negative for blurred vision, double vision, photophobia and pain.   Respiratory: Negative for cough, hemoptysis and shortness of breath.    Cardiovascular: Negative for chest pain, palpitations, orthopnea and leg swelling.   Gastrointestinal: Negative for abdominal pain, blood in stool, constipation, diarrhea, heartburn, nausea and vomiting.   Genitourinary: Negative for dysuria and hematuria.   Musculoskeletal: Negative for falls, joint pain and myalgias.   Skin: Negative for itching and rash.   Neurological: Negative for dizziness, tingling, tremors, sensory change, speech change, focal weakness, loss of consciousness, weakness and headaches.   Endo/Heme/Allergies: Negative for environmental allergies. Does not bruise/bleed easily.   Psychiatric/Behavioral: Negative for depression, hallucinations, memory loss and substance abuse. The patient is not nervous/anxious and does not have insomnia.          PHYSICAL EXAM:    LMP  (LMP Unknown)       Neurologic Exam      Higher Cortical Function:    Patient is a well developed, pleasant, well groomed individual appearing their stated age  Oriented - intact to person, place and time    Fund of knowledge was appropriate.    Language - Speech was fluent without evidence for an aphasia.  Cranial Nerves II - XII:    EOMs were intact with normal smooth and no nystagmus.    PERRLA.   Motor - facial movement was symmetrical and normal.    Facial sensory - Light touch and pin prick sensations were normal.    Hearing was normal.  Palate moved well and was symmetrical    Tongue movement was full & the patient could  speak without difficulty.  Motor: Power, bulk and tone were normal in all extremities.  Coordination:  Normal  Gait:  Wheelchair  Tremor: resting, postural, intentional - none  Cardiovascular:  No edema  Skin: No rash    I spent 60 minutes with the patient, of which 40 minutes was spent in direct face to face counseling in matters related to epilepsy, related safety issues, and antiepileptic drug therapy.       IMPRESSION  1. Focal seizures following hemorrhagic CVA. Generally well controlled on Keppra. Single breakthrough last week.      DISPOSITION:   Given fact that seizures have generally been controlled and recent partial seizure associated with missed dose, would not increase/add AEDs at this time.    Use of tramadol and buproprion (dose recently increased) could increase risk of further seizures--if more breakthroughs occur, suggest decreasing/withdrqawing these meds prior to uptitrating the Keppra  They are to let me know of any further sz activity and cont LEV 500mg TID (including after dialysis dose) for now

## 2019-07-25 ENCOUNTER — OFFICE VISIT (OUTPATIENT)
Dept: PODIATRY | Facility: CLINIC | Age: 59
End: 2019-07-25
Payer: MEDICARE

## 2019-07-25 ENCOUNTER — PES CALL (OUTPATIENT)
Dept: ADMINISTRATIVE | Facility: CLINIC | Age: 59
End: 2019-07-25

## 2019-07-25 VITALS
RESPIRATION RATE: 18 BRPM | BODY MASS INDEX: 32.25 KG/M2 | HEIGHT: 63 IN | WEIGHT: 182 LBS | HEART RATE: 68 BPM | DIASTOLIC BLOOD PRESSURE: 67 MMHG | SYSTOLIC BLOOD PRESSURE: 96 MMHG

## 2019-07-25 DIAGNOSIS — E11.49 TYPE II DIABETES MELLITUS WITH NEUROLOGICAL MANIFESTATIONS: Primary | ICD-10-CM

## 2019-07-25 DIAGNOSIS — L60.9 DISEASE OF NAIL: ICD-10-CM

## 2019-07-25 PROCEDURE — 99999 PR PBB SHADOW E&M-EST. PATIENT-LVL III: CPT | Mod: PBBFAC,HCNC,, | Performed by: PODIATRIST

## 2019-07-25 PROCEDURE — 99499 NO LOS: ICD-10-PCS | Mod: HCNC,S$GLB,, | Performed by: PODIATRIST

## 2019-07-25 PROCEDURE — 99999 PR PBB SHADOW E&M-EST. PATIENT-LVL III: ICD-10-PCS | Mod: PBBFAC,HCNC,, | Performed by: PODIATRIST

## 2019-07-25 PROCEDURE — 11721 DEBRIDE NAIL 6 OR MORE: CPT | Mod: Q9,HCNC,S$GLB, | Performed by: PODIATRIST

## 2019-07-25 PROCEDURE — 99499 UNLISTED E&M SERVICE: CPT | Mod: HCNC,S$GLB,, | Performed by: PODIATRIST

## 2019-07-25 PROCEDURE — 11721 PR DEBRIDEMENT OF NAILS, 6 OR MORE: ICD-10-PCS | Mod: Q9,HCNC,S$GLB, | Performed by: PODIATRIST

## 2019-07-25 NOTE — PROGRESS NOTES
Subjective:      Patient ID: Lucy Perez is a 58 y.o. female.    Chief Complaint: PCP (Beverly Muniz MD 5/08/19); Diabetic Foot Exam; Nail Care; and Foot Pain (left foot, tingling, unable to stand on it )    Lucy is a 58 y.o. female who presents to the clinic for evaluation and treatment of high risk feet. Lucy has a past medical history of Anemia of chronic disease (6/10/2017), Anxiety, Arthritis of right acromioclavicular joint (7/2/2014), Asthma, Bipolar disorder, Bronchitis, acute, Cataract, Cholelithiasis, Chronic diastolic CHF (congestive heart failure), 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 (10/22/2016), Cortical cataract of both eyes (7/26/2016), Decubitus ulcer of buttock, stage 2, Degeneration of lumbar or lumbosacral intervertebral disc (3/5/2013), Depression, Encounter for blood transfusion, ESRD on hemodialysis, General anesthetics causing adverse effect in therapeutic use, Hemorrhagic cerebrovascular accident (CVA), Hemorrhagic cerebrovascular accident (CVA) (1/3/2018), History of stroke (6/28/2017), 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), Hyperlipidemia, Hypertensive retinopathy of both eyes (7/26/2016), Impingement syndrome of right shoulder (7/2/2014), Left ventricular diastolic dysfunction with preserved systolic function (12/15/2015), Mixed anxiety and depressive disorder (3/5/2013), Obesity, THERESA (obstructive sleep apnea) (3/5/2013), Partial symptomatic epilepsy with complex partial seizures, not intractable, without status epilepticus, PEG (percutaneous endoscopic gastrostomy) status (6/28/2017), Renovascular hypertension (3/5/2013), Rheumatoid arthritis(714.0), Rotator cuff tear (7/2/2014), S/P breast biopsy, left (3/5/2013), S/P R shoulder surgery, SAD, DCE, biceps tenotomy (7/15/2014), S/P total hysterectomy (7/10/2013), Sarcoidosis, Stroke (2016), Type 2 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 The patient's chief complaint is long, thick toenails. This patient has documented high risk feet requiring routine maintenance secondary to diabetes mellitis and those secondary complications of diabetes, as mentioned..    PCP: Beverly Muniz MD    Date Last Seen by PCP:   Chief Complaint   Patient presents with    PCP     Beverly Muniz MD 5/08/19    Diabetic Foot Exam    Nail Care    Foot Pain     left foot, tingling, unable to stand on it          Hemoglobin A1C   Date Value Ref Range Status   07/11/2019 7.9 (H) 4.0 - 5.6 % Final     Comment:     ADA Screening Guidelines:  5.7-6.4%  Consistent with prediabetes  >or=6.5%  Consistent with diabetes  High levels of fetal hemoglobin interfere with the HbA1C  assay. Heterozygous hemoglobin variants (HbS, HgC, etc)do  not significantly interfere with this assay.   However, presence of multiple variants may affect accuracy.     04/11/2019 7.5 (H) 4.0 - 5.6 % Final     Comment:     ADA Screening Guidelines:  5.7-6.4%  Consistent with prediabetes  >or=6.5%  Consistent with diabetes  High levels of fetal hemoglobin interfere with the HbA1C  assay. Heterozygous hemoglobin variants (HbS, HgC, etc)do  not significantly interfere with this assay.   However, presence of multiple variants may affect accuracy.     01/15/2019 8.5 (H) 4.0 - 5.6 % Final     Comment:     ADA Screening Guidelines:  5.7-6.4%  Consistent with prediabetes  >or=6.5%  Consistent with diabetes  High levels of fetal hemoglobin interfere with the HbA1C  assay. Heterozygous hemoglobin variants (HbS, HgC, etc)do  not significantly interfere with this assay.   However, presence of  "multiple variants may affect accuracy.         Review of Systems   Constitution: Negative for chills, decreased appetite and fever.   Cardiovascular: Negative for leg swelling.   Skin: Positive for dry skin and nail changes. Negative for color change, flushing and itching.   Musculoskeletal: Positive for muscle weakness (L sided). Negative for arthritis, joint pain, joint swelling and myalgias.   Gastrointestinal: Negative for nausea and vomiting.   Neurological: Positive for focal weakness, loss of balance and weakness. Negative for numbness and paresthesias.           Objective:       Vitals:    07/25/19 1136   BP: 96/67   Pulse: 68   Resp: 18   Weight: 82.6 kg (182 lb)   Height: 5' 3" (1.6 m)   PainSc:   8   PainLoc: Foot        Physical Exam   Constitutional: She is oriented to person, place, and time. She appears well-developed and well-nourished.   Cardiovascular:   Dorsalis pedis and posterior tibial pulses are diminished Bilaterally. Toes are cool to touch. Feet are warm proximally.There is decreased digital hair. Skin is atrophic, slightly hyperpigmented, and mildly edematous       Musculoskeletal: Normal range of motion. She exhibits no edema or tenderness.   Adequate joint range of motion without pain, limitation, nor crepitation Bilateral feet and ankle joints. Muscle strength is 5/5 in all groups bilaterally.         Neurological: She is alert and oriented to person, place, and time.   [place] 5.07 monofilamant testing is diminished Yovani feet. Sharp/dull sensation diminished Bilaterally. Light touch absent Bilaterally.       Skin: Skin is warm, dry and intact. No abrasion, no bruising, no ecchymosis and no lesion noted. No erythema.   Nails x10 are elongated by  2-6mm's, thickened by 2-3 mm's, dystrophic, and are darkened in  coloration . Xerosis Bilaterally. No open lesions noted.       Psychiatric: She has a normal mood and affect. Her behavior is normal.   Vitals reviewed.          "   Assessment:       Encounter Diagnoses   Name Primary?    Type II diabetes mellitus with neurological manifestations Yes    Disease of nail          Plan:       Lucy was seen today for pcp, diabetic foot exam, nail care and foot pain.    Diagnoses and all orders for this visit:    Type II diabetes mellitus with neurological manifestations    Disease of nail      I counseled the patient on her conditions, their implications and medical management.        - Shoe inspection. Diabetic Foot Education. Patient reminded of the importance of good nutrition and blood sugar control to help prevent podiatric complications of diabetes. Patient instructed on proper foot hygeine. We discussed wearing proper shoe gear, daily foot inspections, never walking without protective shoe gear, never putting sharp instruments to feet, routine podiatric nail visits every 2-3 months.      - With patient's permission, nails were aggressively reduced and debrided x 10 to their soft tissue attachment mechanically and with electric , removing all offending nail and debris. Patient relates relief following the procedure. She will continue to monitor the areas daily, inspect her feet, wear protective shoe gear when ambulatory, moisturizer to maintain skin integrity and follow in this office in approximately 2-3 months, sooner p.r.n.

## 2019-08-02 ENCOUNTER — TELEPHONE (OUTPATIENT)
Dept: INTERNAL MEDICINE | Facility: CLINIC | Age: 59
End: 2019-08-02

## 2019-08-02 DIAGNOSIS — Z99.2 ESRD (END STAGE RENAL DISEASE) ON DIALYSIS: Primary | ICD-10-CM

## 2019-08-02 DIAGNOSIS — N18.6 ESRD (END STAGE RENAL DISEASE) ON DIALYSIS: Primary | ICD-10-CM

## 2019-08-02 NOTE — TELEPHONE ENCOUNTER
gloria Gray call and let Carolyn know that pt is on this as needed only per my directions. If she insists on directions: 10 units SQ for Glucose>400.  Thanks

## 2019-08-02 NOTE — TELEPHONE ENCOUNTER
----- Message from Akbar Live sent at 8/2/2019  8:23 AM CDT -----  Contact: Carolyn [phone]  [facility] pharmacy requesting a call from the office to discuss patient insulin medications . Please call and advise, Thanks

## 2019-08-02 NOTE — TELEPHONE ENCOUNTER
Spoke with pharmacy, they need dosing and frequency instructions on Humulin R U100 Vial. Please Advise.

## 2019-08-05 ENCOUNTER — HOSPITAL ENCOUNTER (OUTPATIENT)
Dept: VASCULAR SURGERY | Facility: CLINIC | Age: 59
Discharge: HOME | End: 2019-08-05
Attending: SURGERY
Payer: MEDICARE

## 2019-08-05 DIAGNOSIS — Z99.2 ESRD (END STAGE RENAL DISEASE) ON DIALYSIS: ICD-10-CM

## 2019-08-05 DIAGNOSIS — N18.6 ESRD (END STAGE RENAL DISEASE) ON DIALYSIS: ICD-10-CM

## 2019-08-05 PROCEDURE — 93990 PR DUPLEX HEMODIALYSIS ACCESS: ICD-10-PCS | Mod: HCNC,S$GLB,TXP, | Performed by: SURGERY

## 2019-08-05 PROCEDURE — 93990 DOPPLER FLOW TESTING: CPT | Mod: HCNC,S$GLB,TXP, | Performed by: SURGERY

## 2019-08-06 ENCOUNTER — HOSPITAL ENCOUNTER (OUTPATIENT)
Dept: RADIOLOGY | Facility: HOSPITAL | Age: 59
Discharge: HOME | End: 2019-08-06
Payer: MEDICARE

## 2019-08-06 ENCOUNTER — HOSPITAL ENCOUNTER (OUTPATIENT)
Dept: RADIOLOGY | Facility: HOSPITAL | Age: 59
Discharge: HOME | End: 2019-08-06
Attending: NURSE PRACTITIONER
Payer: MEDICARE

## 2019-08-06 ENCOUNTER — OFFICE VISIT (OUTPATIENT)
Dept: TRANSPLANT | Facility: CLINIC | Age: 59
End: 2019-08-06
Payer: MEDICARE

## 2019-08-06 ENCOUNTER — TELEPHONE (OUTPATIENT)
Dept: TRANSPLANT | Facility: CLINIC | Age: 59
End: 2019-08-06

## 2019-08-06 VITALS
SYSTOLIC BLOOD PRESSURE: 145 MMHG | DIASTOLIC BLOOD PRESSURE: 82 MMHG | HEART RATE: 75 BPM | OXYGEN SATURATION: 100 % | RESPIRATION RATE: 18 BRPM | WEIGHT: 183 LBS | BODY MASS INDEX: 32.43 KG/M2 | TEMPERATURE: 98 F | HEIGHT: 63 IN

## 2019-08-06 DIAGNOSIS — N18.6 ESRD (END STAGE RENAL DISEASE) ON DIALYSIS: Primary | ICD-10-CM

## 2019-08-06 DIAGNOSIS — Z01.818 PRE-TRANSPLANT EVALUATION FOR CKD (CHRONIC KIDNEY DISEASE): ICD-10-CM

## 2019-08-06 DIAGNOSIS — Z76.82 ORGAN TRANSPLANT CANDIDATE: ICD-10-CM

## 2019-08-06 DIAGNOSIS — D63.1 ANEMIA IN ESRD (END-STAGE RENAL DISEASE): ICD-10-CM

## 2019-08-06 DIAGNOSIS — G47.33 OSA (OBSTRUCTIVE SLEEP APNEA): ICD-10-CM

## 2019-08-06 DIAGNOSIS — I10 ESSENTIAL HYPERTENSION: ICD-10-CM

## 2019-08-06 DIAGNOSIS — N18.6 ANEMIA IN ESRD (END-STAGE RENAL DISEASE): ICD-10-CM

## 2019-08-06 DIAGNOSIS — K80.20 GALL STONES: ICD-10-CM

## 2019-08-06 DIAGNOSIS — Z99.2 ESRD (END STAGE RENAL DISEASE) ON DIALYSIS: Primary | ICD-10-CM

## 2019-08-06 DIAGNOSIS — I27.20 PULMONARY HYPERTENSION: ICD-10-CM

## 2019-08-06 DIAGNOSIS — I69.359 HEMIPLEGIA AS LATE EFFECT OF STROKE: ICD-10-CM

## 2019-08-06 DIAGNOSIS — G40.209 PARTIAL SYMPTOMATIC EPILEPSY WITH COMPLEX PARTIAL SEIZURES, NOT INTRACTABLE, WITHOUT STATUS EPILEPTICUS: ICD-10-CM

## 2019-08-06 DIAGNOSIS — E55.9 VITAMIN D DEFICIENCY: ICD-10-CM

## 2019-08-06 PROCEDURE — 71046 XR CHEST PA AND LATERAL: ICD-10-PCS | Mod: 26,HCNC,TXP, | Performed by: RADIOLOGY

## 2019-08-06 PROCEDURE — 99999 PR PBB SHADOW E&M-EST. PATIENT-LVL IV: ICD-10-PCS | Mod: PBBFAC,HCNC,TXP, | Performed by: INTERNAL MEDICINE

## 2019-08-06 PROCEDURE — 93978 US DOPP ILIACS BILATERAL: ICD-10-PCS | Mod: 26,HCNC,TXP, | Performed by: RADIOLOGY

## 2019-08-06 PROCEDURE — 3008F BODY MASS INDEX DOCD: CPT | Mod: HCNC,CPTII,S$GLB,TXP | Performed by: INTERNAL MEDICINE

## 2019-08-06 PROCEDURE — 71046 X-RAY EXAM CHEST 2 VIEWS: CPT | Mod: 26,HCNC,TXP, | Performed by: RADIOLOGY

## 2019-08-06 PROCEDURE — 99204 PR OFFICE/OUTPT VISIT, NEW, LEVL IV, 45-59 MIN: ICD-10-PCS | Mod: HCNC,S$GLB,TXP, | Performed by: TRANSPLANT SURGERY

## 2019-08-06 PROCEDURE — 99204 OFFICE O/P NEW MOD 45 MIN: CPT | Mod: HCNC,S$GLB,TXP, | Performed by: TRANSPLANT SURGERY

## 2019-08-06 PROCEDURE — 99205 OFFICE O/P NEW HI 60 MIN: CPT | Mod: HCNC,S$GLB,TXP, | Performed by: INTERNAL MEDICINE

## 2019-08-06 PROCEDURE — 76770 US RETROPERITONEAL COMPLETE: ICD-10-PCS | Mod: 26,HCNC,TXP, | Performed by: RADIOLOGY

## 2019-08-06 PROCEDURE — 93978 VASCULAR STUDY: CPT | Mod: TC,HCNC,TXP

## 2019-08-06 PROCEDURE — 76770 US EXAM ABDO BACK WALL COMP: CPT | Mod: 26,HCNC,TXP, | Performed by: RADIOLOGY

## 2019-08-06 PROCEDURE — 71046 X-RAY EXAM CHEST 2 VIEWS: CPT | Mod: TC,HCNC,TXP

## 2019-08-06 PROCEDURE — 99999 PR PBB SHADOW E&M-EST. PATIENT-LVL IV: CPT | Mod: PBBFAC,HCNC,TXP, | Performed by: INTERNAL MEDICINE

## 2019-08-06 PROCEDURE — 72170 X-RAY EXAM OF PELVIS: CPT | Mod: 26,HCNC,TXP, | Performed by: RADIOLOGY

## 2019-08-06 PROCEDURE — 99205 PR OFFICE/OUTPT VISIT, NEW, LEVL V, 60-74 MIN: ICD-10-PCS | Mod: HCNC,S$GLB,TXP, | Performed by: INTERNAL MEDICINE

## 2019-08-06 PROCEDURE — 93978 VASCULAR STUDY: CPT | Mod: 26,HCNC,TXP, | Performed by: RADIOLOGY

## 2019-08-06 PROCEDURE — 3008F PR BODY MASS INDEX (BMI) DOCUMENTED: ICD-10-PCS | Mod: HCNC,CPTII,S$GLB,TXP | Performed by: INTERNAL MEDICINE

## 2019-08-06 PROCEDURE — 72170 XR PELVIS ROUTINE AP: ICD-10-PCS | Mod: 26,HCNC,TXP, | Performed by: RADIOLOGY

## 2019-08-06 PROCEDURE — 76770 US EXAM ABDO BACK WALL COMP: CPT | Mod: TC,HCNC,TXP

## 2019-08-06 PROCEDURE — 72170 X-RAY EXAM OF PELVIS: CPT | Mod: TC,HCNC,TXP

## 2019-08-06 NOTE — PROGRESS NOTES
INITIAL PATIENT EDUCATION NOTE    Ms. Lucy Perez was seen in pre-kidney transplant clinic for evaluation for kidney, kidney/pancreas or pancreas only transplant.  The patient attended a group education session that discussed/reviewed the following aspects of transplantation: evaluation and selection committee process, [facility] waitlist management/multiple listings, types of organs offered (KDPI < 85%, KDPI > 85%, PHS increased risk, DCD, HCV+), financial aspects, surgical procedures, dietary instruction pre- and post-transplant, health maintenance pre- and post-transplant, post-transplant hospitalization and outpatient follow-up, potential to participate in a research protocol, and medication management and side effects.  A question and answer session was provided after the presentation.    The patient was seen by all members of the multi-disciplinary team to include: Nephrologist/PA, Surgeon, , Transplant Coordinator, , Pharmacist and Dietician (if applicable).    The patient reviewed and signed all consents for evaluation which were witnessed and sent to scanning into the EPIC chart.    The patient was given an education book and plan for further evaluation based on her individual assessment.      The patient was encouraged to call with any questions or concerns.

## 2019-08-06 NOTE — PROGRESS NOTES
Transplant Nephrology  Kidney Transplant Recipient Evaluation    Referring Physician: Monty Perez  Current Nephrologist: Monty Perez    Subjective:   CC:  Initial evaluation of kidney transplant candidacy.    HPI:  Ms. Perez is a 58 y.o. year old Black or  female who has presented to be evaluated as a potential kidney transplant recipient.  She has ESRD presumed secondary to diabetic nephropathy and HTN. She denies having a native kidney biopsy. Patient is currently on hemodialysis started on 8/20/18. Patient is dialyzing on MWF schedule.  Patient reports that she is tolerating dialysis well.. She has a RUE AV graft for dialysis access. She dialyzes for 4 hours and will not have hypotension. She reports her dry weight is 83.4 kg. Usually gains 2-3 kgs in between sessions. She will have issues with AVG thrombosis requiring intervention on average once a month. Denies prolonged bleeding after needle removal. She urinates ~less than a cup a day. Last blood transfusion was in August 2018.     She was diagnosed with DM in 1999 and states she was initially on oral agents and started insulin shortly after.     She was diagnosed with HTN in the 1990s.     She states she had a CVA and aneurysm rupture? on 8/13/16 and since has been wheelchair dependent. She has residual left sided hemiparesis.     Per patient she denies heart disease.     States she has had decubitus ulcers on her buttocks - per patient while she was in the hospital for 2-4 weeks.     She is accompanied to visit by her  and daughter.    Previous Transplant: no    Functional Status: She is wheelchair dependent. Her  and daughter help her with ADLs.     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    Cortical cataract of both eyes 7/26/2016    Decubitus ulcer of buttock, stage 2     Degeneration of lumbar or lumbosacral intervertebral disc 3/5/2013    S/p MRI L-spine 5/2009     Depression     Encounter for blood transfusion     ESRD on hemodialysis     Started August 2018    General anesthetics causing adverse effect in therapeutic use     Hemorrhagic cerebrovascular accident (CVA)     8/2016 s/p Hemicraniotomy at [facility] with Left hemiparesis    Hemorrhagic cerebrovascular accident (CVA) 1/3/2018    History of stroke 6/28/2017    s/p R-MCA stroke with R-putaminal hemorrhagic transformation in 8/2016 and 11/2016 (s/p hemicraniotomy at [facility]) with residual L hemiparesis, on AED s/p CVA      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    Hyperlipidemia     Hypertensive retinopathy of both eyes 7/26/2016    Impingement syndrome of right shoulder 7/2/2014    Left ventricular diastolic dysfunction with preserved systolic function 12/15/2015    Mixed anxiety and depressive disorder 3/5/2013    Obesity     THERESA (obstructive sleep apnea) 3/5/2013    No Home CPAP 2ndary to cost     Partial symptomatic epilepsy with complex partial seizures, not intractable, without status epilepticus     PEG (percutaneous endoscopic gastrostomy) status 6/28/2017    Renovascular hypertension 3/5/2013    Will resume home medications: amlodipine, labetalol, and hydralazine Will hold Lisinopril in setting of DARLING.   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 without macular edema,  without long-term current use of insulin 8/2/2017    Type 2 diabetes mellitus with diabetic polyneuropathy, without long-term current use of insulin 10/22/2015    Type 2 diabetes mellitus with stage 3 chronic kidney disease, without long-term current use of insulin 10/22/2015    Vertebral artery stenosis 3/5/2013    S/p Stenting per Dr Burnett        Past Surgical History:   Past Surgical History:   Procedure Laterality Date    ARTHROSCOPY-SHOULDER WITH SUBACROMIAL DECOMPRESSION Right 7/9/2014    Performed by Kendall [facility]antoja [facility] at [facility] OR    AV GRAFT CREATION Right 10/18/2018    Performed by Meir Valencia [facility] at [facility] OR 2ND FLR    Biceps Tenotomy Right 7/9/2014    Performed by [facility]all [facility]antoja [facility] at [facility] OR    BREAST SURGERY      breast reduction    COLONOSCOPY N/A 8/11/2016    Performed by Jerry Vilchis MD at [facility] ENDO (4TH FLR)    DEBRIDEMENT-SHOULDER-ARTHROSCOPIC Labral Cuff Right 7/9/2014    Performed by Kendall [facility]antoja [facility] at [facility] OR    DECLOT-GRAFT Right 6/20/2019    Performed by Cabrera Irwin MD at [facility] CATH LAB    ESOPHAGOGASTRODUODENOSCOPY (EGD) N/A 12/6/2017    Performed by Dallas Lock Jr., [facility] ENDO    [id number] END Right 7/9/2014    Performed by Kendall Pantoja MD at [facility] OR    Fistulogram Right 7/8/2019    Performed by WELLINGTON Palm III, MD at [facility] CATH LAB    Fistulogram Right 2/11/2019    Performed by Meir Valencia MD at [facility] CATH LAB    HYSTERECTOMY      PTA, Fistula  7/8/2019    Performed by WELLINGTON Palm III, MD at [facility] CATH LAB    ROTATOR CUFF REPAIR Right July 9, 2014    right side    Skull surgery      Aneurysm    stent placed      in vertebral artery    Stent, Fistula  7/8/2019    Performed by WELLINGTON Palm III, MD at [facility] CATH LAB    TOTAL REDUCTION MAMMOPLASTY      TUBAL LIGATION         Medications:   Current Outpatient Medications   Medication Sig Dispense Refill    acetaminophen (TYLENOL) 325 MG tablet  "Take 2 tablets (650 mg total) by mouth every 6 (six) hours as needed for Pain.  0    albuterol (PROVENTIL) 2.5 mg /3 mL (0.083 %) nebulizer solution Take 3 mLs (2.5 mg total) by nebulization every 6 (six) hours as needed for Wheezing. Rescue 25 each 3    albuterol (VENTOLIN HFA) 90 mcg/actuation inhaler Inhale 2 puffs into the lungs every 6 (six) hours as needed for Wheezing. Rescue 18 g 0    ALPRAZolam (XANAX) 0.5 MG tablet 1 tab Every 8 hours as needed for anxiety 30 tablet 0    aspirin (ECOTRIN) 81 MG EC tablet Take 81 mg by mouth every morning.       atorvastatin (LIPITOR) 40 MG tablet TAKE 1 TABLET ONE TIME DAILY FOR CHOLESTEROL 90 tablet 2    BD INSULIN PEN NEEDLE UF SHORT 31 gauge x 5/16" Ndle USE TO INJECT NOVOLOG FLEXPEN BEFORE MEALS 150 each 11    bisacodyl (DULCOLAX) 10 mg Supp Place 1 suppository (10 mg total) rectally daily as needed. 30 suppository 3    blood sugar diagnostic (ACCU-CHEK SMARTVIEW TEST STRIP) Strp 1 strip by Misc.(Non-Drug; Combo Route) route 2 (two) times daily. 300 each 3    buPROPion (WELLBUTRIN XL) 300 MG 24 hr tablet Take 1 tablet (300 mg total) by mouth once daily. 30 tablet 6    DULCOLAX, BISACODYL, ORAL Take by mouth as needed (constipation).      ergocalciferol (ERGOCALCIFEROL) 50,000 unit Cap Take 1 capsule (50,000 Units total) by mouth every 7 days.      famotidine (PEPCID) 20 MG tablet Take 1 tablet (20 mg total) by mouth once daily. 90 tablet 2    folic acid (FOLVITE) 1 MG tablet Take 1 tablet (1 mg total) by mouth once daily. 90 tablet 2    furosemide (LASIX) 40 MG tablet Take 1 tablet (40 mg total) by mouth 2 (two) times daily. 90 tablet 2    insulin glargine (LANTUS U-100 INSULIN) 100 unit/mL injection Inject 10 Units into the skin every evening. 3 mL 0    levETIRAcetam (KEPPRA) 500 MG Tab Take 1 tablet (500 mg total) by mouth every 8 (eight) hours. 270 tablet 2    lidocaine-prilocaine (EMLA) cream Apply topically as needed. 30 g 1    polyethylene " glycol (MIRALAX) 17 gram/dose powder Take 17 g by mouth 2 (two) times daily. 1 Bottle 6    pregabalin (LYRICA) 25 MG capsule Take 1 capsule (25 mg total) by mouth once daily. May take additional dose after HD. (Patient taking differently: Take 25 mg by mouth daily as needed. May take additional dose after HD.) 90 capsule 4    sevelamer carbonate (RENVELA) 800 mg Tab Take 800 mg by mouth 3 (three) times daily with meals.      traMADol (ULTRAM) 50 mg tablet 1 tab Q6-8 hours as needed for joint pain 30 tablet 0    flash glucose scanning reader (FREESTYLE JOSÉ LUIS 14 DAY READER) Misc Use as directed. Scan 4 times a day. 1 each 0    flash glucose sensor (FREESTYLE JOSÉ LUIS 14 DAY SENSOR) Kit Change every 14 days. 2 kit 11     No current facility-administered medications for this visit.        Social History:  Social History     Socioeconomic History    Marital status:      Spouse name: Not on file    Number of children: Not on file    Years of education: Not on file    Highest education level: Not on file   Occupational History    Not on file   Social Needs    Financial resource strain: Not on file    Food insecurity:     Worry: Not on file     Inability: Not on file    Transportation needs:     Medical: Not on file     Non-medical: Not on file   Tobacco Use    Smoking status: Never Smoker    Smokeless tobacco: Never Used   Substance and Sexual Activity    Alcohol use: Yes     Comment: occasional wine cooler     Drug use: Never    Sexual activity: Yes     Partners: Male   Lifestyle    Physical activity:     Days per week: Not on file     Minutes per session: Not on file    Stress: Not on file   Relationships    Social connections:     Talks on phone: Not on file     Gets together: Not on file     Attends Bahai service: Not on file     Active member of club or organization: Not on file     Attends meetings of clubs or organizations: Not on file     Relationship status: Not on file   Other Topics  "Concern    Not on file   Social History Narrative    . 2 children(Wilder and Reina). Does not work. Previously worked as a .        Family History:   Family History   Problem Relation Age of Onset    Diabetes Mother     Hypertension Mother     Heart disease Mother     Heart attack Mother     Breast cancer Mother     Stroke Sister     Hypertension Sister     Sleep apnea Sister     No Known Problems Daughter     Diabetes Son     Bell's palsy Sister     Lupus Sister     Blindness Maternal Grandmother     Diabetes Unknown         "My entire family family has diabetes"    Stroke Maternal Aunt     Amblyopia Neg Hx     Cataracts Neg Hx     Glaucoma Neg Hx     Macular degeneration Neg Hx     Retinal detachment Neg Hx     Strabismus Neg Hx     Kidney disease Neg Hx        Review of Systems  Constitutional: +fatigue; +night sweats; Denies fever/chills  EENT: +vision problems; Denies hearing problems, trouble swallowing.   Respiratory: +dyspnea on exertion -  states she was SOB when he transferred her from toilet to chair; Denies orthopnea, wheezing, hemoptysis, denies known TB exposure or history of positive TB skin test  Cardiovascular: +history of stroke; ?carotid artery stent per daughter; Denies chest pain, palpitations, history of MI, history of DVT  Gastrointestinal: +constipation; Denies abdominal pain, nausea/vomiting/diarrhea. Denies history of GI bleeding or ulcers.   Genitourinary: +gross hematuria ~1 month ago a day or so after she had AVG declot; +incontinence; Denies history of kidney stones, recurrent UTI's, history of urinary obstruction, dysuria, urinary frequency,   Musculoskeletal: +left hemiparesis after CVA in August 2016; +rheumatoid arthritis  Skin: Denies history of skin cancer, denies rash, ulcerations  Heme/onc: Denies any history of cancer, denies history of coagulopathies or bleeding disorders  Endocrine: Denies thyroid disease, unintentional " "weight loss/weight gain  Neurological: +dizziness/lightheadedness with abrupt position changes; +seizures - last seizure was 3-4 weeks ago - on Keppra -  states this seizure was first time in a year; +headaches; +peripheral neuropathy; Denies tremors  Psychiatric: +depression related to being so debilitated and dependent on family for everything; Denies anxiety. Denies hallucinations or delusions.    Potential Donors: sister      Objective:   Blood pressure (!) 145/82, pulse 75, temperature 98.1 °F (36.7 °C), temperature source Oral, resp. rate 18, height 5' 3" (1.6 m), weight 83 kg (182 lb 15.7 oz), SpO2 100 %.body mass index is 32.41 kg/m².    Physical Exam (examined patient while she was sitting in wheelchair)  General: No acute distress, well groomed, alert and oriented x 3  HEENT: Normocephalic, atraumatic, sclera anicteric, conjunctiva/corneas clear, EOM's intact bilaterally, external inspection of ears and nose normal, moist mucous membranes, no oral ulcerations/lesions   Neck: Supple, symmetrical, trachea midline, no masses, no carotid bruits, no JVD, thyroid is normal without nodules or enlargement   Respiratory: Clear to auscultation bilaterally, respirations unlabored, no rales/rhonchi/wheezing   Cardiovacular: Regular rate and rhythm, S1, S2 normal, no murmurs, no rubs or gallops   Gastrointestinal: Soft, non-tender, bowel sounds normal, no masses, no palpable organomegaly  Extremities: No clubbing or cyanosis of upper extremities bilaterally, no pedal edema bilaterally; +2 bilateral radial pulses  Skin: warm and dry; no rash on exposed skin  Lymph nodes: Cervical and supraclavicular nodes normal   Neurologic: left hemiparesis  Musculoskeletal: wheelchair dependent; RLE strength 4+/5  Psychiatric: Normal mood and affect. Responds appropriately to questions.      Labs:  Lab Results   Component Value Date    WBC 5.72 07/11/2019    HGB 8.0 (L) 07/11/2019    HCT 26.1 (L) 07/11/2019     " 07/23/2019    K 4.1 07/23/2019    CL 91 (L) 07/23/2019    CO2 33 (H) 07/23/2019    BUN 44 (H) 07/23/2019    CREATININE 6.4 (H) 07/23/2019    EGFRNONAA 6.5 (A) 07/23/2019    CALCIUM 10.0 07/23/2019    PHOS 3.9 07/23/2019    MG 2.4 07/11/2019    ALBUMIN 3.8 07/23/2019    AST 7 (L) 07/11/2019    ALT <5 (L) 07/11/2019    UTPCR 1.97 (H) 02/26/2018    .5 (H) 02/26/2018       Lab Results   Component Value Date    BILIRUBINUA Negative 06/22/2019    AMYLASE 22 09/11/2004    LIPASE 64 (H) 07/05/2019    PROTEINUA 2+ (A) 06/22/2019    NITRITE Negative 06/22/2019    RBCUA 0 06/22/2019    WBCUA 5 06/22/2019       No results found for: HLAABCTYPE    Labs were reviewed with the patient.    Assessment:     1. ESRD (end stage renal disease) on dialysis    2. Anemia in ESRD (end-stage renal disease)    3. Essential hypertension    4. Gall stones    5. LEFT Hemiplegia as late effect of stroke    6. THERESA (obstructive sleep apnea)    7. Pulmonary hypertension    8. Partial symptomatic epilepsy with complex partial seizures, not intractable, without status epilepticus    9. Vitamin D deficiency    10. Pre-transplant evaluation for CKD (chronic kidney disease)        Plan:     Transplant Candidacy:   After obtaining history and performing physical exam as well as reviewing available diagnostic studies, Ms. Perez is a marginal kidney transplant candidate secondary to CVA with residual left sided hemiparesis, being wheelchair bound, BLANKENSHIP, dizziness/lightheadedness, obesity, and depression.  Final determination of transplant candidacy will be made once workup is complete and reviewed by the selection committee.    Discussed with Dr. Healy and decided to allow patient to see SW and get iliac doppler (and other afternoon studies) but would hold off on any further testing until she was discussed in committee. Blood work had not been obtained at time of this writer's evaluation and thus discussed with patient and family regarding how  we can hold off on obtaining blood work unless committee feels she is suitable to proceed with transplant evaluation.     Would bring up case for group discussion whether she can proceed with transplant evaluation. Patient and family very motivated for transplant. This writer extensively discussed increased risk of post-transplant complications and whether risks outweigh any possible benefits of transplant.     Encouraged patient to check her blood sugar when she gets sweaty. Encouraged patient to discuss with her PCP regarding whether she would benefit from PT. Encouraged her to work on her strength and functional status.     Zaira Vogt MD       [facility] Patient Status  Functional Status: 40% - Disabled: requires special care and assistance  Physical Capacity: Wheelchair bound or more limited

## 2019-08-06 NOTE — PROGRESS NOTES
Transplant Recipient Adult Psychosocial Assessment    Lucy Perez  [address] [facility] [address]  Telephone Information:   Mobile [phone]   Home  [phone] (home)  Work  There is no work phone number on file.  E-mail  [email or web address]    Sex: female  YOB: 1960  Age: 58 y.o.    Encounter Date: 8/6/2019  [place] Citizen: yes  Primary Language: English   Needed: no    Emergency Contact:  Name: Reina Perez  Relationship: daughter  Address: [address] [place] [address]   Phone Numbers:   [phone] (mobile)    Family/Social Support:   Number of dependents/: Pt denies any dependants in the home. Pt's daughter reports having an 11 year old daughter and reports having other family members assist with her at time of transplant.  Marital history: Pt reports being  once to current , Wilder Perez.   Other family dynamics: Pt reports having having 2 sisters and 2 children that are supportive are her going through the transplant process.     Household Composition:  Name: Wilder Perez  Age: 59  Relationship:   Does person drive? yes    Name: Wilder Perez II  Age: 38  Relationship: son   Phone: [phone]  Does person drive? yes    Do you and your caregivers have access to reliable transportation? yes Pt reports having a car that recently broke down but getting it fixed soon.  PRIMARY CAREGIVER: Wilder Perez, , and Reina Perez, daughter, will be primary caregivers, phone number [phone] and [phone].      provided in-depth information to patient and caregiver regarding pre- and post-transplant caregiver role.   strongly encourages patient and caregiver to have concrete plan regarding post-transplant care giving, including back-up caregiver(s) to ensure care giving needs are met as needed.    Patient and Caregiver states understanding all aspects of caregiver  role/commitment and is able/willing/committed to being caregiver to the fullest extent necessary.    Patient and Caregiver verbalizes understanding of the education provided today and caregiver responsibilities.         remains available. Patient and Caregiver agree to contact  in a timely manner if concerns arise.      Able to take time off work without financial concerns: yes.     Additional Significant Others who will Assist with Transplant:  Name: Wilder Mahmood II  Age: 38  City: [place] State: [place]  Relationship: son  Does person drive? yes    Living Will: yes  Healthcare Power of : yes  Advance Directives on file: <Received per medical record.  Verbally reviewed LW/HCPA information.   provided patient with copy of LW/HCPA documents and provided education on completion of forms.    Living Donors: Yes.  Name: Wilder Perez II, son .    Highest Education Level: Attended College/Technical School  Reading Ability: Pt reports reading ability is at college level but that it takes time to process information since stroke in 2016  Reports difficulty with: reading, writing, seeing, comprehension, learning and memory SW encouraged pt to prioritize making apt with eye doctor to have prescription updated. Pt reports having short term memory loss and other learning difficulties due to stroke.   Learns Best By:  Hands on learning and repetition      Status: no Pt reports her  served in the  but doesn't have any VA benefits.  VA Benefits: no     Working for Income: No  If no, reason not working: Disability  Patient is disabled.  Prior to disability, patient  was employed as a  with [employer].    Spouse/Significant Other Employment: Pt's  does not work due to disability. Pt's daughter works as a  and can take time off as necessary.     Disabled: yes: date disability began: 2005, due to: either bipolar disorder or  rheumatoid arthitis .    Monthly Income:   Disability: $800   Pt's 's disability: $1,200  Able to afford all costs now and if transplanted, including medications: yes Pt also reports her family can assist as necessary.  Patient and Caregiver verbalizes understanding of personal responsibilities related to transplant costs and the importance of having a financial plan to ensure that patients transplant costs are fully covered.      provided fundraising information/education.  Patient and Caregiver verbalizes understanding.   remains available.    Insurance:   Payor/Plan Subscr  Sex Relation Sub. Ins. ID Effective Group Num   1. HU[employer] MANAGE* ARASH BARRIENTOS* 1960 Female  [id number] 11/1/13 [id number]                                   [address] [address]       Primary Insurance (for UNOS reporting): Public Insurance - Medicare FFS (Fee For Service)  Secondary Insurance (for UNOS reporting): None     SW encouraged pt to obtain secondary insurance.    Patient and Caregiver verbalizes clear understanding that patient may experience difficulty obtaining and/or be denied insurance coverage post-surgery. This includes and is not limited to disability insurance, life insurance, health insurance, burial insurance, long term care insurance, and other insurances.    Patient and Caregiver also reports understanding that future health concerns related to or unrelated to transplantation may not be covered by patient's insurance.  Resources and information provided and reviewed.      Patient and Caregiver provides verbal permission to release any necessary information to outside resources for patient care and discharge planning.  Resources and information provided are reviewed.      Dialysis Adherence:  Patient reports dialysis adherence. Mary, nurse at pt's dialysis unit reports over the last three months that patient has had 0 AMAs, 0 no shows and no issues with labs, transportation or  caregiver support. Nurse had no concerns.      Infusion Service: patient utilizing? no  Home Health: patient utilizing? yes Pt reports utilizing home health after her stroke in 2016 for skilled nursing, PT, OT and SLP. Pt reports still needing to go to PT twice a week but being admitted to the hospital the last two times she's tried to be go to PT. Pt's daughter reports being committed to scheduling PT appointments.   DME: yes Pt reports actively using a shaunna lift, medical bed, bedside commode, wheelchair, walker and cane and also having an oxygen tank but no longer using it  Pulmonary/Cardiac Rehab: Pt denies pulmonary or cardiac rehab   ADLS:  Pt reports being able to drive but no longer driving, being able to feed herself and bath herself with assistance. Pt reports utilizing her wheelchair to get around.     Adherence:   Pt reports being highly compliant with everything.  Adherence education and counseling provided.     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    Cortical cataract of both eyes 7/26/2016    Decubitus ulcer of buttock, stage 2     Degeneration of lumbar or lumbosacral intervertebral disc 3/5/2013    S/p MRI L-spine 5/2009     Depression     Encounter for blood transfusion     ESRD on hemodialysis     Started August 2018    General anesthetics causing adverse effect in therapeutic use     Hemorrhagic cerebrovascular accident (CVA)     8/2016 [place] [facility] [place] hemiparesis    Hemorrhagic cerebrovascular accident (CVA) 1/3/2018    History of stroke 6/28/2017    s/p R-MCA stroke with R-putaminal hemorrhagic transformation in  8/2016 and 11/2016 (s/p hemicraniotomy at [facility]) with residual L hemiparesis, on AED s/p CVA      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    Hyperlipidemia     Hypertensive retinopathy of both eyes 7/26/2016    Impingement syndrome of right shoulder 7/2/2014    Left ventricular diastolic dysfunction with preserved systolic function 12/15/2015    Mixed anxiety and depressive disorder 3/5/2013    Obesity     THERESA (obstructive sleep apnea) 3/5/2013    No Home CPAP 2ndary to cost     Partial symptomatic epilepsy with complex partial seizures, not intractable, without status epilepticus     PEG (percutaneous endoscopic gastrostomy) status 6/28/2017    Renovascular hypertension 3/5/2013    Will resume home medications: amlodipine, labetalol, and hydralazine Will hold Lisinopril in setting of DARLING.   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 without macular edema, without long-term current use of insulin 8/2/2017    Type 2 diabetes mellitus with diabetic polyneuropathy, without long-term current use of insulin 10/22/2015    Type 2 diabetes mellitus with stage 3 chronic kidney disease, without long-term current use of insulin 10/22/2015    Vertebral artery stenosis 3/5/2013    S/p Stenting per Dr Burnett      Social History     Tobacco Use    Smoking status: Never Smoker    Smokeless tobacco: Never Used   Substance Use Topics    Alcohol use: Yes     Comment: occasional wine cooler      Social History     Substance and Sexual Activity   Drug Use Never     Social History     Substance and Sexual Activity   Sexual Activity Yes    Partners: Male       Per Today's Psychosocial:  Tobacco: none,  patient denies any use.  Alcohol: Pt reports having a wine cooler or daiquiri on very special ocassions   Illicit Drugs/Non-prescribed Medications: none, patient denies any use.    Patient and Caregiver states clear understanding of the potential impact of substance use as it relates to transplant candidacy and is aware of possible random substance screening.  Substance abstinence/cessation counseling, education and resources provided and reviewed.     Arrests/DWI/Treatment/Rehab: patient denies    Psychiatric History:    Mental Health: Pt reports having anxiety from being on dialysis and experiencing depression due to being disabled from her stroke. Pt reports managing anxiety and depression well with good social support and her skip.   Psychiatrist/Counselor: Pt reports seeing a psychiatrist in the past but couldn't recall when. Pt also reports seeing her PCP Dr. Beverly Max for her medications and liking her PCP.   Medications:  Pt takes Xanax as needed and Lexapro daily.   Suicide/Homicide Issues: Pt denies any SI or HI.  Safety at home: Pt denied any sexual abuse, physical abuse, verbal abuse, emotional abuse or mental abuse.     Knowledge: Patient and Caregiver states having clear understanding and realistic expectations regarding the potential risks and potential benefits of organ transplantation and organ donation, agrees to discuss with health care team members and support system members and to utilize available resources and express questions and/or concerns in order to further facilitate the pt informed decision-making.  Resources and information provided and reviewed.     Patient and Caregiver is aware of [facility]'s affiliation and/or partnership with agencies in home health care, LTAC, SNF, [facility], and other hospitals and clinics.    Understanding: Patient and Caregiver reports having a clear understanding of the many lifetime commitments involved with being a transplant recipient, including costs,  compliance, medications, lab work, procedures, appointments, concrete and financial planning, preparedness, timely and appropriate communication of concerns, abstinence (ETOH, tobacco, illicit non-prescribed drugs), adherence to all health care team recommendations, support system and caregiver involvement, appropriate and timely resource utilization and follow-through, mental health counseling as needed/recommended, and patient and caregiver responsibilities.  Social Service Handbook, resources and detailed educational information provided and reviewed.  Educational information provided.    Patient and Caregiver also reports current and expected compliance with health care regime and states having a clear understanding of the importance of compliance.      Patient and Caregiver reports a clear understanding that risks and benefits may be involved with organ transplantation and with organ donation.      Patient and Caregiver also reports clear understanding that psychosocial risk factors may affect patient, and include but are not limited to feelings of depression, generalized anxiety, anxiety regarding dependence on others, post traumatic stress disorder, feelings of guilt and other emotional and/or mental concerns, and/or exacerbation of existing mental health concerns.  Detailed resources provided and discussed.     Patient and Caregiver agrees to access appropriate resources in a timely manner as needed and/or as recommended, and to communicate concerns appropriately.  Patient and Caregiver also reports a clear understanding of treatment options available.      reviewed education, provided additional information, and answered questions.    Feelings or Concerns: Pt and pts caregivers report being highly motivated to receive a kidney transplant. Pt denied any concerns.     Coping: Pt reports coping well with the process by sitting outside, spending time with her granddaughter, being skip driven and  having good social support.    Goals: Pt reports wanting to travel, get off dialysis and become more independent post transplant.      Interview Behavior: Patient and Caregiver presents as alert and oriented x 4, pleasant, good eye contact, well groomed, recall good, concentration/judgement good, average intelligence, calm, communicative, cooperative and asking and answering questions appropriately. Pt presents with her  and daughter per pt's request.         Transplant Social Work - Candidacy  Assessment/Plan:     Psychosocial Suitability: Patient presents as an acceptable candidate for kidney transplant at this time. Based on psychosocial risk factors, patient presents as high risk, due to pt being wheelchair bound, dependant on DMEs, caregivers and unable to care for self post transplant.    Recommendations/Additional Comments: Per , Pao, pt has $15 co-pays and unlimited days for outpatient PT, $35 co-pays for eye exams and free diabetic eye exams, up to $200 for glasses, basic gym coverage, dental coverage, and meals delivered when she's initially discharged home. SW relayed information to pt's daughter. SW strongly encourages pt to make eye doctor appointment and follow up with PT. SW also highly recommends pt obtain secondary insurance. SW recommends pt conduct fundraising to assist in the transplant process. SW recommends that pt remain aware of potential mental health concerns and contact the team if any concerns arise. SW recommends that pt remain abstinent from tobacco, ETOH and drug use. SW support pt's continued dialysis adherence. SW remains available to answer any questions or concerns that arise as the pt moves through the transplant process.      WANDY Madden

## 2019-08-06 NOTE — PROGRESS NOTES
"PHARM.D. PRE-TRANSPLANT NOTE:    This patient's medication therapy was evaluated as part of her pre-transplant evaluation.      The following general pharmacologic concerns were noted: Patient currently on levetiracetam, alprazolam, bupropion, pregabalin, and tramadol.    The following pharmacologic concerns related to HCV therapy were noted: Patient has a prior diagnosis of artial fibrillation.      This patient's medication profile was reviewed for contraindications for DAA Hepatitis C therapy:    [X]  No current inducers of CYP 3A4 or PGP  [X]  No amiodarone on this patient's EMR profile in the last 24 months  [YES]  No past or current atrial fibrillation on this patient's EMR profile       Current Outpatient Medications   Medication Sig Dispense Refill    acetaminophen (TYLENOL) 325 MG tablet Take 2 tablets (650 mg total) by mouth every 6 (six) hours as needed for Pain.  0    albuterol (PROVENTIL) 2.5 mg /3 mL (0.083 %) nebulizer solution Take 3 mLs (2.5 mg total) by nebulization every 6 (six) hours as needed for Wheezing. Rescue 25 each 3    albuterol (VENTOLIN HFA) 90 mcg/actuation inhaler Inhale 2 puffs into the lungs every 6 (six) hours as needed for Wheezing. Rescue 18 g 0    ALPRAZolam (XANAX) 0.5 MG tablet 1 tab Every 8 hours as needed for anxiety 30 tablet 0    aspirin (ECOTRIN) 81 MG EC tablet Take 81 mg by mouth every morning.       atorvastatin (LIPITOR) 40 MG tablet TAKE 1 TABLET ONE TIME DAILY FOR CHOLESTEROL 90 tablet 2    BD INSULIN PEN NEEDLE UF SHORT 31 gauge x 5/16" Ndle USE TO INJECT NOVOLOG FLEXPEN BEFORE MEALS 150 each 11    bisacodyl (DULCOLAX) 10 mg Supp Place 1 suppository (10 mg total) rectally daily as needed. 30 suppository 3    blood sugar diagnostic (ACCU-CHEK SMARTVIEW TEST STRIP) Strp 1 strip by Misc.(Non-Drug; Combo Route) route 2 (two) times daily. 300 each 3    buPROPion (WELLBUTRIN XL) 300 MG 24 hr tablet Take 1 tablet (300 mg total) by mouth once daily. 30 tablet 6 "    DULCOLAX, BISACODYL, ORAL Take by mouth as needed (constipation).      ergocalciferol (ERGOCALCIFEROL) 50,000 unit Cap Take 1 capsule (50,000 Units total) by mouth every 7 days.      famotidine (PEPCID) 20 MG tablet Take 1 tablet (20 mg total) by mouth once daily. 90 tablet 2    folic acid (FOLVITE) 1 MG tablet Take 1 tablet (1 mg total) by mouth once daily. 90 tablet 2    furosemide (LASIX) 40 MG tablet Take 1 tablet (40 mg total) by mouth 2 (two) times daily. 90 tablet 2    insulin glargine (LANTUS U-100 INSULIN) 100 unit/mL injection Inject 10 Units into the skin every evening. 3 mL 0    levETIRAcetam (KEPPRA) 500 MG Tab Take 1 tablet (500 mg total) by mouth every 8 (eight) hours. 270 tablet 2    lidocaine-prilocaine (EMLA) cream Apply topically as needed. 30 g 1    polyethylene glycol (MIRALAX) 17 gram/dose powder Take 17 g by mouth 2 (two) times daily. 1 Bottle 6    pregabalin (LYRICA) 25 MG capsule Take 1 capsule (25 mg total) by mouth once daily. May take additional dose after HD. (Patient taking differently: Take 25 mg by mouth daily as needed. May take additional dose after HD.) 90 capsule 4    sevelamer carbonate (RENVELA) 800 mg Tab Take 800 mg by mouth 3 (three) times daily with meals.      traMADol (ULTRAM) 50 mg tablet 1 tab Q6-8 hours as needed for joint pain 30 tablet 0    flash glucose scanning reader (FREESTYLE JOSÉ LUIS 14 DAY READER) Misc Use as directed. Scan 4 times a day. 1 each 0    flash glucose sensor (FREESTYLE JOSÉ LUIS 14 DAY SENSOR) Kit Change every 14 days. 2 kit 11     No current facility-administered medications for this visit.          Currently the patient's family is responsible for preparing / administering this patient's medications on a daily basis.  I am available for consultation and can be contacted, as needed by the other members of the Kidney Transplant team.

## 2019-08-06 NOTE — LETTER
August 7, 2019        Monty Perez  [address]  [address]  [place] [address]  Phone: [phone]  Fax: [phone]             [facility]  [address]  [facility] [address]  Phone: [phone]   Patient: Lucy Perez   MR Number: [id number]   YOB: 1960   Date of Visit: 8/6/2019       Dear Dr. Monty Perez    Thank you for referring Lucy Perez to me for evaluation. Attached you will find relevant portions of my assessment and plan of care.    If you have questions, please do not hesitate to call me. I look forward to following Lucy Perez along with you.    Sincerely,    Zaira Vogt MD    Enclosure    If you would like to receive this communication electronically, please contact [email or web address] or [phone] to request [employer] access.    [employer] is a tool which provides read-only access to select patient information with whom you have a relationship. Its easy to use and provides real time access to review your patients record including encounter summaries, notes, results, and demographic information.    If you feel you have received this communication in error or would no longer like to receive these types of communications, please e-mail [email or web address]

## 2019-08-06 NOTE — PATIENT INSTRUCTIONS
1. Check your blood sugar when you get sweats   2. Try to work with your PCP to see if you would benefit from rehab  3. We will bring your case up for group discussion to see whether you can proceed with further transplant evaluation  4. Since they haven't been able to get your blood as of yet this morning we will hold off on getting blood from you until the committee decision regarding whether you can proceed

## 2019-08-06 NOTE — PROGRESS NOTES
Transplant Surgery  Kidney Transplant Recipient Evaluation    Referring Physician: Monty Perez  Current Nephrologist: Monty Perez    Subjective:     Reason for Visit: evaluate transplant candidacy    History of Present Illness: Lucy Perez is a 58 y.o. year old female undergoing transplant evaluation.    Dialysis History: Lucy is on hemodialysis.      Transplant History: N/A    Etiology of Renal Disease: Diabetes Mellitus - Type II (based on medical records from referral).    Review of Systems    Objective:     Physical Exam:  Constitutional:   Vitals reviewed: yes   Well-nourished and well-groomed: yes  Eyes:   Sclerae icteric: no   Extraocular movements intact: yes  GI:    Bowel sounds normal: yes   Tenderness: no    If yes, quadrant/location: not applicable   Palpable masses: no    If yes, quadrant/location: not applicable   Hepatosplenomegaly: no   Ascites: no   Hernia: no    If yes, type/location: not applicable   Surgical scars: yes    If yes, type/location: scar from prior G tube   Resp:   Effort normal: yes   Breath sounds normal: yes    CV:   Regular rate and rhythm: yes   Heart sounds normal: yes   Femoral pulses normal: yes   Extremities edematous: yes  Skin:   Rashes or lesions present: no    If yes, describe:not applicable   Jaundice:: no    Musculoskeletal:   Gait normal: wheelchair bound due to prior stroke;  family assists with all ADL's   Strength normal: no;  Left sided hemiaparesis present  Psych:   Oriented to person, place, and time: yes   Affect and mood normal: yes    Additional comments: not applicable    Counseling: We provided Lucy Perez with a group education session today.  We discussed kidney transplantation at length with her, including risks, potential complications, and alternatives in the management of her renal failure.  The discussion included complications related to anesthesia, bleeding, infection, primary nonfunction, and ATN.  I discussed the  typical postoperative course, length of hospitalization, the need for long-term immunosuppression, and the need for long-term routine follow-up.  I discussed living-donor and -donor transplantation and the relative advantages and disadvantages of each.  I also discussed average waiting times for both living donation and  donation.  I discussed national and center-specific survival rates.  I also mentioned the potential benefit of multicenter listing to candidates listed with centers within more than one organ procurement organization.  All questions were answered.    Final determination of transplant candidacy will be made once evaluation is complete and reviewed by the Kidney & Kidney/Pancreas Selection Committee.         Transplant Surgery - Candidacy   Assessment/Plan:   Lucy Perez has end stage renal disease (ESRD) on dialysis. I have concerns with her frailty, post stroke.  She seems to have a lot of support but requires full assistance with ADL's and care.  . Based on available information, Lucy Perez is a high-risk kidney transplant candidate.     Mann Healy Jr, MD

## 2019-08-06 NOTE — PROGRESS NOTES
Pre Transplant Infectious Diseases Consult  Kidney Transplant Recipient Evaluation      Reason for Visit:  Pre Transplant Evaluation    Organ:  Kidney    Etiology of Kidney Disease:  Diabetic nephropathy and HTN    History of Prior Transplant:  {YesNoImmuneSuppresion:74342}    Currently taking immunosuppressants/steroids:  {YES NO:10783}    History of Splenectomy:  {YES NO:29421}    Infectious History:  Current/recent infections or currently taking antibiotics?  {YES **/No:68250}  History of recurrent infections (sinuses, throat, bladder/kidneys, intestines, skin, dental, lung, catheter (HD/PD) related, or peritonitis/SBP)?  {YES NO:50302}  Any major hospitalizations due to infection?  {YES **/No:16120}  If diabetic, history of diabetic foot infection/osteomyelitis?  {YES NO:13368}  History of shingles?  {YES NO:47609}  History of STDs (syphilis, viral hepatitis, HIV)?  {YES **/No:01415}  Exposure to TB or ever had a positive TB skin test?  {YesWhenTreatmentNo:16087}  History of residence in coccidioides endemic areas ([facility]?  {YES NO:16008}  Any foreign travel?  {YES **/No:65733}  Any associated illness?  {YES NO:89290}    Social/Environmental:  Occupational:  ***  Animal exposures (dogs, cats, farm animals, bird cages, fish tanks):  {YES **/No:64049}  Hobbies (gardening, hike, fish/hunting, etc): ***  Consumption of raw/undercooked meat or seafood?  {YES NO:01852}  Any injectable or smoked recreational drug use?  {YES NO:88849}    Immunization History:  Childhood vaccines:  {YES NO:23806}  Last Flu shot:   Tetanus/TDAP:  Hepatitis A:  Hepatitis B:  Prevnar-13:  Pneumovax-23:  Shingles (Zostavax/Shingrix):  Meningococcal:  Other:     Serologies:  Hep B Core Total Ab   Date Value Ref Range Status   08/11/2018 Negative  Final     Hep B S Ab   Date Value Ref Range Status   08/11/2018 Positive (A)  Final     Hepatitis B Surface Ag   Date Value Ref Range Status   07/11/2019 Negative  Final        Review of  Systems   Constitution: Negative for chills, decreased appetite, fever, malaise/fatigue and night sweats.   HENT: Negative for congestion, hearing loss, hoarse voice, sore throat and tinnitus.    Eyes: Negative for blurred vision, redness and visual disturbance.   Cardiovascular: Negative for chest pain, leg swelling and palpitations.   Respiratory: Negative for cough, hemoptysis, shortness of breath, sputum production and wheezing.    Endocrine: Negative for cold intolerance and heat intolerance.   Hematologic/Lymphatic: Negative for adenopathy. Does not bruise/bleed easily.   Skin: Negative for dry skin, itching, rash and suspicious lesions.   Musculoskeletal: Negative for back pain, joint pain, myalgias and neck pain.   Gastrointestinal: Negative for abdominal pain, constipation, diarrhea, heartburn, nausea and vomiting.   Genitourinary: Negative for dysuria, flank pain, frequency, hematuria, hesitancy and urgency.   Neurological: Negative for dizziness, headaches, numbness, paresthesias and weakness.   Psychiatric/Behavioral: Negative for depression and memory loss. The patient does not have insomnia and is not nervous/anxious.    Allergic/Immunologic: Negative for environmental allergies, HIV exposure, hives and persistent infections.     Physical Exam   Constitutional: She is oriented to person, place, and time. She appears well-developed and well-nourished. No distress.   HENT:   Head: Normocephalic and atraumatic.   Mouth/Throat: She does not have dentures. Normal dentition. No dental abscesses or dental caries. No oropharyngeal exudate.   Eyes: Conjunctivae are normal. No scleral icterus.   Neck: Neck supple.   Cardiovascular: Normal rate and regular rhythm.   No murmur heard.  Pulmonary/Chest: Effort normal and breath sounds normal. No respiratory distress. She has no wheezes. She has no rales.   Abdominal: Soft. Bowel sounds are normal. She exhibits no distension. There is no tenderness.    Musculoskeletal: She exhibits no edema.   RUE AVG c/d/i   Lymphadenopathy:     She has no cervical adenopathy.   Neurological: She is alert and oriented to person, place, and time.   Skin: Skin is warm and dry. No rash noted. She is not diaphoretic. No erythema.   Psychiatric: She has a normal mood and affect. Her behavior is normal. Judgment and thought content normal.            Counseling:   I discussed with the patient the risk for increased susceptibility to infections following transplantation including increased risk for infection right after transplant and if rejection should occur.  The patient has been counseled on the importance of vaccinations including but not limited to a yearly flu vaccine. Patient was also instructed to encourage that family/caretakers receive their flu vaccine yearly. The patient was encouraged to contact us about any problems that may develop after immunizations and possible side effects were reviewed.     Specific guidance has been provided to the patient regarding the patient's occupation, hobbies and activities to avoid future infectious complications. These include but are not limited to: avoiding raw/undercooked meats and seafood, avoiding unpasteurized milk/cheeses, proper (hand) hygiene, contact with animals and appropriate vaccination of animals, use of mosquito/tick precautions, avoiding walking barefoot, avoiding sick contacts, and seeking medical advice prior to foreign travel (specifically developing countries).       Transplant Candidacy: Based on available information, there are no identified significant barriers to transplantation from an infectious disease standpoint pending acceptable serologies and subject to recommendations below.     Immunizations recommended:          Final determination of transplant candidacy will be made once evaluation is complete and reviewed by the Transplant Selection Committee.

## 2019-08-13 ENCOUNTER — TELEPHONE (OUTPATIENT)
Dept: TRANSPLANT | Facility: CLINIC | Age: 59
End: 2019-08-13

## 2019-08-13 NOTE — PROGRESS NOTES
Iliac calcifications are present but external iliac arteries are spared, see CT July 2019.  Note the external arteries are small but should be acceptable for transplant.      Need repeat CT in 2 years if not transplanted.

## 2019-08-27 ENCOUNTER — OFFICE VISIT (OUTPATIENT)
Dept: CARDIOLOGY | Facility: CLINIC | Age: 59
End: 2019-08-27
Payer: MEDICARE

## 2019-08-27 VITALS
DIASTOLIC BLOOD PRESSURE: 71 MMHG | HEIGHT: 63 IN | HEART RATE: 77 BPM | BODY MASS INDEX: 32.41 KG/M2 | SYSTOLIC BLOOD PRESSURE: 147 MMHG

## 2019-08-27 DIAGNOSIS — Z01.810 PREOP CARDIOVASCULAR EXAM: Primary | ICD-10-CM

## 2019-08-27 DIAGNOSIS — N18.6 CONTROLLED TYPE 2 DIABETES MELLITUS WITH CHRONIC KIDNEY DISEASE ON CHRONIC DIALYSIS, WITHOUT LONG-TERM CURRENT USE OF INSULIN: ICD-10-CM

## 2019-08-27 DIAGNOSIS — Z99.2 ESRD (END STAGE RENAL DISEASE) ON DIALYSIS: ICD-10-CM

## 2019-08-27 DIAGNOSIS — Z99.2 CONTROLLED TYPE 2 DIABETES MELLITUS WITH CHRONIC KIDNEY DISEASE ON CHRONIC DIALYSIS, WITHOUT LONG-TERM CURRENT USE OF INSULIN: ICD-10-CM

## 2019-08-27 DIAGNOSIS — N18.6 ESRD (END STAGE RENAL DISEASE) ON DIALYSIS: ICD-10-CM

## 2019-08-27 DIAGNOSIS — Z99.2 HEMODIALYSIS PATIENT: ICD-10-CM

## 2019-08-27 DIAGNOSIS — I63.9 CEREBROVASCULAR ACCIDENT (CVA), UNSPECIFIED MECHANISM: ICD-10-CM

## 2019-08-27 DIAGNOSIS — I10 ESSENTIAL HYPERTENSION: ICD-10-CM

## 2019-08-27 DIAGNOSIS — E11.22 CONTROLLED TYPE 2 DIABETES MELLITUS WITH CHRONIC KIDNEY DISEASE ON CHRONIC DIALYSIS, WITHOUT LONG-TERM CURRENT USE OF INSULIN: ICD-10-CM

## 2019-08-27 PROCEDURE — 3045F PR MOST RECENT HEMOGLOBIN A1C LEVEL 7.0-9.0%: CPT | Mod: HCNC,CPTII,GC,S$GLB | Performed by: STUDENT IN AN ORGANIZED HEALTH CARE EDUCATION/TRAINING PROGRAM

## 2019-08-27 PROCEDURE — 99999 PR PBB SHADOW E&M-EST. PATIENT-LVL V: CPT | Mod: PBBFAC,HCNC,GC,TXP | Performed by: STUDENT IN AN ORGANIZED HEALTH CARE EDUCATION/TRAINING PROGRAM

## 2019-08-27 PROCEDURE — 3008F BODY MASS INDEX DOCD: CPT | Mod: HCNC,CPTII,GC,S$GLB | Performed by: STUDENT IN AN ORGANIZED HEALTH CARE EDUCATION/TRAINING PROGRAM

## 2019-08-27 PROCEDURE — 99204 PR OFFICE/OUTPT VISIT, NEW, LEVL IV, 45-59 MIN: ICD-10-PCS | Mod: HCNC,GC,S$GLB,TXP | Performed by: STUDENT IN AN ORGANIZED HEALTH CARE EDUCATION/TRAINING PROGRAM

## 2019-08-27 PROCEDURE — 99204 OFFICE O/P NEW MOD 45 MIN: CPT | Mod: HCNC,GC,S$GLB,TXP | Performed by: STUDENT IN AN ORGANIZED HEALTH CARE EDUCATION/TRAINING PROGRAM

## 2019-08-27 PROCEDURE — 99999 PR PBB SHADOW E&M-EST. PATIENT-LVL V: ICD-10-PCS | Mod: PBBFAC,HCNC,GC,TXP | Performed by: STUDENT IN AN ORGANIZED HEALTH CARE EDUCATION/TRAINING PROGRAM

## 2019-08-27 PROCEDURE — 3008F PR BODY MASS INDEX (BMI) DOCUMENTED: ICD-10-PCS | Mod: HCNC,CPTII,GC,S$GLB | Performed by: STUDENT IN AN ORGANIZED HEALTH CARE EDUCATION/TRAINING PROGRAM

## 2019-08-27 PROCEDURE — 3045F PR MOST RECENT HEMOGLOBIN A1C LEVEL 7.0-9.0%: ICD-10-PCS | Mod: HCNC,CPTII,GC,S$GLB | Performed by: STUDENT IN AN ORGANIZED HEALTH CARE EDUCATION/TRAINING PROGRAM

## 2019-08-27 NOTE — PROGRESS NOTES
Cardiology Clinic Note  Reason for Visit: Pre operative evaluation prior to kidney transplant    HPI:   Pt is a 59 year old lady who is here for pre operative evaluation prior to kidney transplant.     She has a hx of hypertension, diabetes on insulin, prior stroke, and is end stage renal disease on dialysis mwf for the last year. Of note her CVA has left her with left sided weakness she is confined to a wheel chair and unable to achieve > 4 METS. Today a PET stress was ordered however was unable to be done as she could not put her arm above her head so the test was cancelled.     She has no symptoms of chest pain, shortness of breath, palpitations, weight gain, pnd, orthopnea however she is not very active.    Review of Systems   All other systems reviewed and are negative.      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    Cortical cataract of both eyes 7/26/2016    Decubitus ulcer of buttock, stage 2     Degeneration of lumbar or lumbosacral intervertebral disc 3/5/2013    S/p MRI L-spine 5/2009     Depression     Encounter for blood transfusion     ESRD on hemodialysis     Started August 2018    General anesthetics causing adverse effect in therapeutic use     Hemorrhagic cerebrovascular accident (CVA)     8/2016 s/p Hemicraniotomy at [facility] [place] [facility] with Left hemiparesis    Hemorrhagic cerebrovascular accident (CVA) 1/3/2018    History of stroke 6/28/2017    s/p R-MCA stroke with R-putaminal hemorrhagic transformation in 8/2016 and 11/2016 (s/p hemicraniotomy at [facility] [place]) with residual L hemiparesis, on AED s/p CVA      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    Hyperlipidemia      Hypertensive retinopathy of both eyes 7/26/2016    Impingement syndrome of right shoulder 7/2/2014    Left ventricular diastolic dysfunction with preserved systolic function 12/15/2015    Mixed anxiety and depressive disorder 3/5/2013    Obesity     THERESA (obstructive sleep apnea) 3/5/2013    No Home CPAP 2ndary to cost     Partial symptomatic epilepsy with complex partial seizures, not intractable, without status epilepticus     PEG (percutaneous endoscopic gastrostomy) status 6/28/2017    Renovascular hypertension 3/5/2013    Will resume home medications: amlodipine, labetalol, and hydralazine Will hold Lisinopril in setting of DARLING.   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 without macular edema, without long-term current use of insulin 8/2/2017    Type 2 diabetes mellitus with diabetic polyneuropathy, without long-term current use of insulin 10/22/2015    Type 2 diabetes mellitus with stage 3 chronic kidney disease, without long-term current use of insulin 10/22/2015    Vertebral artery stenosis 3/5/2013    S/p Stenting per Dr Burnett      Past Surgical History:   Procedure Laterality Date    ARTHROSCOPY-SHOULDER WITH SUBACROMIAL DECOMPRESSION Right 7/9/2014    Performed by [facility] at [facility] OR    AV GRAFT CREATION Right 10/18/2018    Performed by Meir [facility] at [facility] OR 2ND FLR    Biceps Tenotomy Right 7/9/2014    Performed by [facility]ja [facility] at [facility] OR    BREAST SURGERY      breast reduction    COLONOSCOPY N/A 8/11/2016    Performed by Jerry Vilchis MD at [facility] ENDO (4TH FLR)    DEBRIDEMENT-SHOULDER-ARTHROSCOPIC Labral Cuff Right  7/9/2014    Performed by [facility] at [facility] OR    DECLOT-GRAFT Right 6/20/2019    Performed by Cabrera Irwin [facility] at [facility] CATH LAB    ESOPHAGOGASTRODUODENOSCOPY (EGD) N/A 12/6/2017    [facility] by Dallas Lock Jr[facility] at [facility] ENDO    POQULQOQ-YACQCZZU-SXLOFX END Right 7/9/2014    Performed by Kendall Pantoja MD at [facility] OR    Fistulogram Right 7/8/2019    Performed by WLELINGTON Palm III, MD at [facility] CATH LAB    Fistulogram Right 2/11/2019    Performed by Meir Valencia MD at [facility] CATH LAB    HYSTERECTOMY      PTA, Fistula  7/8/2019    Performed by WELLINGTON Palm III, MD at [facility] CATH LAB    ROTATOR CUFF REPAIR Right July 9, 2014    right side    Skull surgery      Aneurysm    stent placed      in vertebral artery    Stent, Fistula  7/8/2019    Performed by WELLINGTON Palm III, MD at [facility] CATH LAB    TOTAL REDUCTION MAMMOPLASTY      TUBAL LIGATION       Allergies:     Review of patient's allergies indicates:   Allergen Reactions    Lisinopril Other (See Comments)     Angioedema      Vicodin [hydrocodone-acetaminophen] Rash     No problem with acetaminophen      Medications:     Current Outpatient Medications on File Prior to Visit   Medication Sig Dispense Refill    acetaminophen (TYLENOL) 325 MG tablet Take 2 tablets (650 mg total) by mouth every 6 (six) hours as needed for Pain.  0    albuterol (PROVENTIL) 2.5 mg /3 mL (0.083 %) nebulizer solution Take 3 mLs (2.5 mg total) by nebulization every 6 (six) hours as needed for Wheezing. Rescue 25 each 3    albuterol (VENTOLIN HFA) 90 mcg/actuation inhaler Inhale 2 puffs into the lungs every 6 (six) hours as needed for Wheezing. Rescue 18 g 0    ALPRAZolam (XANAX) 0.5 MG tablet 1 tab Every 8 hours as needed for anxiety 30 tablet 0    aspirin (ECOTRIN) 81 MG EC tablet Take 81 mg by mouth every morning.       atorvastatin (LIPITOR) 40 MG tablet TAKE 1 TABLET ONE TIME DAILY FOR CHOLESTEROL 90 tablet 2    BD INSULIN PEN  "NEEDLE UF SHORT 31 gauge x 5/16" Ndle USE TO INJECT NOVOLOG FLEXPEN BEFORE MEALS 150 each 11    bisacodyl (DULCOLAX) 10 mg Supp Place 1 suppository (10 mg total) rectally daily as needed. 30 suppository 3    blood sugar diagnostic (ACCU-CHEK SMARTVIEW TEST STRIP) Strp 1 strip by Misc.(Non-Drug; Combo Route) route 2 (two) times daily. 300 each 3    buPROPion (WELLBUTRIN XL) 300 MG 24 hr tablet Take 1 tablet (300 mg total) by mouth once daily. 30 tablet 6    DULCOLAX, BISACODYL, ORAL Take by mouth as needed (constipation).      ergocalciferol (ERGOCALCIFEROL) 50,000 unit Cap Take 1 capsule (50,000 Units total) by mouth every 7 days.      famotidine (PEPCID) 20 MG tablet Take 1 tablet (20 mg total) by mouth once daily. 90 tablet 2    flash glucose scanning reader (Precision Through ImagingSTYLE JOSÉ LUIS 14 DAY READER) Misc Use as directed. Scan 4 times a day. 1 each 0    flash glucose sensor (FREESTYLE JOSÉ LUIS 14 DAY SENSOR) Kit Change every 14 days. 2 kit 11    folic acid (FOLVITE) 1 MG tablet Take 1 tablet (1 mg total) by mouth once daily. 90 tablet 2    furosemide (LASIX) 40 MG tablet Take 1 tablet (40 mg total) by mouth 2 (two) times daily. 90 tablet 2    insulin glargine (LANTUS U-100 INSULIN) 100 unit/mL injection Inject 10 Units into the skin every evening. 3 mL 0    lidocaine-prilocaine (EMLA) cream Apply topically as needed. 30 g 1    polyethylene glycol (MIRALAX) 17 gram/dose powder Take 17 g by mouth 2 (two) times daily. 1 Bottle 6    pregabalin (LYRICA) 25 MG capsule Take 1 capsule (25 mg total) by mouth once daily. May take additional dose after HD. (Patient taking differently: Take 25 mg by mouth daily as needed. May take additional dose after HD.) 90 capsule 4    sevelamer carbonate (RENVELA) 800 mg Tab Take 800 mg by mouth 3 (three) times daily with meals.      traMADol (ULTRAM) 50 mg tablet 1 tab Q6-8 hours as needed for joint pain 30 tablet 0    levETIRAcetam (KEPPRA) 500 MG Tab Take 1 tablet (500 mg total) by " "mouth every 8 (eight) hours. 270 tablet 2     No current facility-administered medications on file prior to visit.      Social History:     Social History     Tobacco Use    Smoking status: Never Smoker    Smokeless tobacco: Never Used   Substance Use Topics    Alcohol use: Yes     Comment: occasional wine cooler      Family History:     Family History   Problem Relation Age of Onset    Diabetes Mother     Hypertension Mother     Heart disease Mother     Heart attack Mother     Breast cancer Mother     Stroke Sister     Hypertension Sister     Sleep apnea Sister     No Known Problems Daughter     Diabetes Son     Bell's palsy Sister     Lupus Sister     Blindness Maternal Grandmother     Diabetes Unknown         "My entire family family has diabetes"    Stroke Maternal Aunt     Amblyopia Neg Hx     Cataracts Neg Hx     Glaucoma Neg Hx     Macular degeneration Neg Hx     Retinal detachment Neg Hx     Strabismus Neg Hx     Kidney disease Neg Hx        Physical Exam  BP (!) 147/71 (BP Location: Left arm, Patient Position: Sitting, BP Method: X-Large (Automatic))   Pulse 77   Ht 5' 3" (1.6 m)   LMP  (LMP Unknown)   BMI 32.41 kg/m²    GEN: Alert and oriented in NAD  NECK: no JVD appreciated   CVS: RRR, s1/s2, 2/6 flow murmur  PULM: CTAB no rales  ABD: NT/ND BS +  Extremities: warm and dry, palpable pulses, no edema  NEURO: Alert and oriented x 3  PSYCH: appropriate affect.             Labs:     Lab Results   Component Value Date     07/23/2019    K 4.1 07/23/2019    CL 91 (L) 07/23/2019    CO2 33 (H) 07/23/2019    BUN 44 (H) 07/23/2019    CREATININE 6.4 (H) 07/23/2019    ANIONGAP 14 07/23/2019     Lab Results   Component Value Date    HGBA1C 7.9 (H) 07/11/2019     Lab Results   Component Value Date     (H) 08/09/2018     (H) 07/23/2018     (H) 02/24/2018    Lab Results   Component Value Date    WBC 5.72 07/11/2019    HGB 8.0 (L) 07/11/2019    HCT 26.1 (L) 07/11/2019 "    HCT 33 (L) 04/12/2019     07/11/2019    GRAN 3.8 07/11/2019    GRAN 65.8 07/11/2019     Lab Results   Component Value Date    CHOL 262 (H) 04/11/2019    HDL 62 04/11/2019    LDLCALC 179.8 (H) 04/11/2019    TRIG 101 04/11/2019          No results found for: EF    EKG: normal sinus rhythm, no blocks or conduction defects, no ischemic changes    Assessment and Plan  Lucy Perez is a 59 y.o. lady who is here for pre operative risk evaluation prior to kidney transplant.     .  1. Preop cardiovascular exam   Risk Stratification:     Revised Cardiac Risk Index   High risk surgery (intraperitoneal, intrathoracic, or suprainguinal vascular): No  History of ischemic heart disease: No  History of congestive heart failure: No  History of stroke: Yes  Insulin dependent diabetes: Yes  Creatinine > 2: Yes  3 points, class IV risk, 11% risk of cardiac event    Pt is high risk for a moderate risk surgery. Can proceed with   stress echo as this will not require her arm to be over her head. 2014 ACC/AHA Perioperative Guidelines  Known or risk factors for CAD: No  High risk of MACE: Yes  Functional capacity < 4 METs: Yes       2. Hemodialysis patient   MWF dialysis  Echocardiogram stress test   3. ESRD (end stage renal disease) on dialysis  Echocardiogram stress test   4. Essential hypertension   BP controlled today  Echocardiogram stress test   5. Controlled type 2 diabetes mellitus with chronic kidney disease on chronic dialysis, without long-term current use of insulin   Not well controlled.  Echocardiogram stress test   6. Cerebrovascular accident (CVA), unspecified mechanism   With left sided weakness.       Plan    stress echo.     Signed:        Alonso Storey MD  Cardiology Fellow  Pager [phone]

## 2019-08-28 NOTE — PROGRESS NOTES
I have personally taken the history and examined this patient and agree with the fellow's note as stated above. Her revised cardiac risk index is at least 15% for perioperative significant CV events.

## 2019-08-29 ENCOUNTER — LAB VISIT (OUTPATIENT)
Dept: LAB | Facility: HOSPITAL | Age: 59
End: 2019-08-29
Attending: INTERNAL MEDICINE
Payer: MEDICARE

## 2019-08-29 ENCOUNTER — CLINICAL SUPPORT (OUTPATIENT)
Dept: INTERNAL MEDICINE | Facility: CLINIC | Age: 59
End: 2019-08-29
Payer: MEDICARE

## 2019-08-29 DIAGNOSIS — R31.9 HEMATURIA, UNSPECIFIED TYPE: ICD-10-CM

## 2019-08-29 DIAGNOSIS — I10 ESSENTIAL HYPERTENSION: ICD-10-CM

## 2019-08-29 DIAGNOSIS — Z99.2 TYPE 2 DIABETES MELLITUS WITH CHRONIC KIDNEY DISEASE ON CHRONIC DIALYSIS, WITH LONG-TERM CURRENT USE OF INSULIN: ICD-10-CM

## 2019-08-29 DIAGNOSIS — E78.5 HYPERLIPIDEMIA, UNSPECIFIED HYPERLIPIDEMIA TYPE: ICD-10-CM

## 2019-08-29 DIAGNOSIS — Z79.4 TYPE 2 DIABETES MELLITUS WITH CHRONIC KIDNEY DISEASE ON CHRONIC DIALYSIS, WITH LONG-TERM CURRENT USE OF INSULIN: ICD-10-CM

## 2019-08-29 DIAGNOSIS — N18.6 TYPE 2 DIABETES MELLITUS WITH CHRONIC KIDNEY DISEASE ON CHRONIC DIALYSIS, WITH LONG-TERM CURRENT USE OF INSULIN: ICD-10-CM

## 2019-08-29 DIAGNOSIS — E11.22 TYPE 2 DIABETES MELLITUS WITH CHRONIC KIDNEY DISEASE ON CHRONIC DIALYSIS, WITH LONG-TERM CURRENT USE OF INSULIN: ICD-10-CM

## 2019-08-29 LAB
ALBUMIN SERPL BCP-MCNC: 4.4 G/DL (ref 3.5–5.2)
ALP SERPL-CCNC: 115 U/L (ref 55–135)
ALT SERPL W/O P-5'-P-CCNC: 9 U/L (ref 10–44)
ANION GAP SERPL CALC-SCNC: 14 MMOL/L (ref 8–16)
AST SERPL-CCNC: 12 U/L (ref 10–40)
BASOPHILS # BLD AUTO: 0.07 K/UL (ref 0–0.2)
BASOPHILS NFR BLD: 1.1 % (ref 0–1.9)
BILIRUB SERPL-MCNC: 0.7 MG/DL (ref 0.1–1)
BUN SERPL-MCNC: 42 MG/DL (ref 8–23)
CALCIUM SERPL-MCNC: 10.5 MG/DL (ref 8.7–10.5)
CHLORIDE SERPL-SCNC: 93 MMOL/L (ref 95–110)
CHOLEST SERPL-MCNC: 200 MG/DL (ref 120–199)
CHOLEST/HDLC SERPL: 2.4 {RATIO} (ref 2–5)
CO2 SERPL-SCNC: 33 MMOL/L (ref 23–29)
CREAT SERPL-MCNC: 5.9 MG/DL (ref 0.5–1.4)
DIFFERENTIAL METHOD: ABNORMAL
EOSINOPHIL # BLD AUTO: 0.1 K/UL (ref 0–0.5)
EOSINOPHIL NFR BLD: 1.5 % (ref 0–8)
ERYTHROCYTE [DISTWIDTH] IN BLOOD BY AUTOMATED COUNT: 15.1 % (ref 11.5–14.5)
EST. GFR  (AFRICAN AMERICAN): 8.2 ML/MIN/1.73 M^2
EST. GFR  (NON AFRICAN AMERICAN): 7.1 ML/MIN/1.73 M^2
ESTIMATED AVG GLUCOSE: 148 MG/DL (ref 68–131)
GLUCOSE SERPL-MCNC: 219 MG/DL (ref 70–110)
HBA1C MFR BLD HPLC: 6.8 % (ref 4–5.6)
HCT VFR BLD AUTO: 40.4 % (ref 37–48.5)
HDLC SERPL-MCNC: 85 MG/DL (ref 40–75)
HDLC SERPL: 42.5 % (ref 20–50)
HGB BLD-MCNC: 12.3 G/DL (ref 12–16)
IMM GRANULOCYTES # BLD AUTO: 0.03 K/UL (ref 0–0.04)
IMM GRANULOCYTES NFR BLD AUTO: 0.5 % (ref 0–0.5)
LDLC SERPL CALC-MCNC: 98.2 MG/DL (ref 63–159)
LYMPHOCYTES # BLD AUTO: 1.6 K/UL (ref 1–4.8)
LYMPHOCYTES NFR BLD: 24.7 % (ref 18–48)
MCH RBC QN AUTO: 33.3 PG (ref 27–31)
MCHC RBC AUTO-ENTMCNC: 30.4 G/DL (ref 32–36)
MCV RBC AUTO: 110 FL (ref 82–98)
MONOCYTES # BLD AUTO: 0.6 K/UL (ref 0.3–1)
MONOCYTES NFR BLD: 9.7 % (ref 4–15)
NEUTROPHILS # BLD AUTO: 4.1 K/UL (ref 1.8–7.7)
NEUTROPHILS NFR BLD: 62.5 % (ref 38–73)
NONHDLC SERPL-MCNC: 115 MG/DL
NRBC BLD-RTO: 0 /100 WBC
PLATELET # BLD AUTO: 245 K/UL (ref 150–350)
PMV BLD AUTO: 10.1 FL (ref 9.2–12.9)
POTASSIUM SERPL-SCNC: 4.6 MMOL/L (ref 3.5–5.1)
PROT SERPL-MCNC: 8.7 G/DL (ref 6–8.4)
RBC # BLD AUTO: 3.69 M/UL (ref 4–5.4)
SODIUM SERPL-SCNC: 140 MMOL/L (ref 136–145)
TRIGL SERPL-MCNC: 84 MG/DL (ref 30–150)
WBC # BLD AUTO: 6.51 K/UL (ref 3.9–12.7)

## 2019-08-29 PROCEDURE — 80061 LIPID PANEL: CPT | Mod: HCNC,NTX

## 2019-08-29 PROCEDURE — 85025 COMPLETE CBC W/AUTO DIFF WBC: CPT | Mod: HCNC,TXP

## 2019-08-29 PROCEDURE — 83036 HEMOGLOBIN GLYCOSYLATED A1C: CPT | Mod: HCNC,NTX

## 2019-08-29 PROCEDURE — 36415 COLL VENOUS BLD VENIPUNCTURE: CPT | Mod: HCNC,PO,TXP

## 2019-08-29 PROCEDURE — 80053 COMPREHEN METABOLIC PANEL: CPT | Mod: HCNC,TXP

## 2019-09-01 DIAGNOSIS — I61.9 HEMORRHAGIC CEREBROVASCULAR ACCIDENT (CVA): ICD-10-CM

## 2019-09-02 ENCOUNTER — TELEPHONE (OUTPATIENT)
Dept: INTERNAL MEDICINE | Facility: CLINIC | Age: 59
End: 2019-09-02

## 2019-09-02 RX ORDER — INSULIN PUMP SYRINGE, 3 ML
EACH MISCELLANEOUS
Qty: 1 EACH | Refills: 0 | Status: SHIPPED | OUTPATIENT
Start: 2019-09-02 | End: 2019-10-01

## 2019-09-02 RX ORDER — FUROSEMIDE 40 MG/1
TABLET ORAL
Qty: 90 TABLET | Refills: 2 | Status: ON HOLD | OUTPATIENT
Start: 2019-09-02 | End: 2019-10-08 | Stop reason: HOSPADM

## 2019-09-02 RX ORDER — LEVETIRACETAM 500 MG/1
TABLET ORAL
Qty: 90 TABLET | Refills: 8 | Status: ON HOLD | OUTPATIENT
Start: 2019-09-02 | End: 2019-10-08 | Stop reason: HOSPADM

## 2019-09-02 RX ORDER — CIPROFLOXACIN 500 MG/1
500 TABLET ORAL EVERY 12 HOURS
Qty: 14 TABLET | Refills: 0 | Status: ON HOLD | OUTPATIENT
Start: 2019-09-02 | End: 2019-10-08 | Stop reason: HOSPADM

## 2019-09-02 RX ORDER — LANCETS
1 EACH MISCELLANEOUS 2 TIMES DAILY WITH MEALS
Qty: 300 EACH | Refills: 4 | Status: SHIPPED | OUTPATIENT
Start: 2019-09-02 | End: 2019-12-24

## 2019-09-02 NOTE — TELEPHONE ENCOUNTER
Spoke with Reina ruiz her Mom's Urine Cx(8/29)-showed E coli>100,000cfu ESBL. Given ESRD, 'Will send in Ciprofloxacin 500mg BID x 1 week and plan to do a f/u cath U/A and Cx after anbx.  If no resolution, will refer to Urology.

## 2019-09-02 NOTE — TELEPHONE ENCOUNTER
Spoke with pharmacist at [facility] and verbal ordered in for pt generic  Glucometer/strips/lancets.

## 2019-09-03 ENCOUNTER — OFFICE VISIT (OUTPATIENT)
Dept: INTERNAL MEDICINE | Facility: CLINIC | Age: 59
End: 2019-09-03
Payer: MEDICARE

## 2019-09-03 VITALS
HEART RATE: 86 BPM | DIASTOLIC BLOOD PRESSURE: 78 MMHG | OXYGEN SATURATION: 97 % | TEMPERATURE: 98 F | SYSTOLIC BLOOD PRESSURE: 118 MMHG | HEIGHT: 63 IN | WEIGHT: 181.69 LBS | BODY MASS INDEX: 32.19 KG/M2

## 2019-09-03 DIAGNOSIS — N18.5 TYPE 2 DIABETES MELLITUS WITH STAGE 5 CHRONIC KIDNEY DISEASE NOT ON CHRONIC DIALYSIS, WITH LONG-TERM CURRENT USE OF INSULIN: Primary | ICD-10-CM

## 2019-09-03 DIAGNOSIS — Z79.4 TYPE 2 DIABETES MELLITUS WITH STAGE 5 CHRONIC KIDNEY DISEASE NOT ON CHRONIC DIALYSIS, WITH LONG-TERM CURRENT USE OF INSULIN: Primary | ICD-10-CM

## 2019-09-03 DIAGNOSIS — E11.22 TYPE 2 DIABETES MELLITUS WITH STAGE 5 CHRONIC KIDNEY DISEASE NOT ON CHRONIC DIALYSIS, WITH LONG-TERM CURRENT USE OF INSULIN: Primary | ICD-10-CM

## 2019-09-03 DIAGNOSIS — E78.5 HYPERLIPIDEMIA, UNSPECIFIED HYPERLIPIDEMIA TYPE: ICD-10-CM

## 2019-09-03 DIAGNOSIS — I10 ESSENTIAL HYPERTENSION: ICD-10-CM

## 2019-09-03 PROCEDURE — 3008F PR BODY MASS INDEX (BMI) DOCUMENTED: ICD-10-PCS | Mod: HCNC,CPTII,S$GLB, | Performed by: INTERNAL MEDICINE

## 2019-09-03 PROCEDURE — 99214 PR OFFICE/OUTPT VISIT, EST, LEVL IV, 30-39 MIN: ICD-10-PCS | Mod: HCNC,S$GLB,, | Performed by: INTERNAL MEDICINE

## 2019-09-03 PROCEDURE — 3044F PR MOST RECENT HEMOGLOBIN A1C LEVEL <7.0%: ICD-10-PCS | Mod: HCNC,CPTII,S$GLB, | Performed by: INTERNAL MEDICINE

## 2019-09-03 PROCEDURE — 3008F BODY MASS INDEX DOCD: CPT | Mod: HCNC,CPTII,S$GLB, | Performed by: INTERNAL MEDICINE

## 2019-09-03 PROCEDURE — 99214 OFFICE O/P EST MOD 30 MIN: CPT | Mod: HCNC,S$GLB,, | Performed by: INTERNAL MEDICINE

## 2019-09-03 PROCEDURE — 99999 PR PBB SHADOW E&M-EST. PATIENT-LVL III: ICD-10-PCS | Mod: PBBFAC,HCNC,, | Performed by: INTERNAL MEDICINE

## 2019-09-03 PROCEDURE — 99999 PR PBB SHADOW E&M-EST. PATIENT-LVL III: CPT | Mod: PBBFAC,HCNC,, | Performed by: INTERNAL MEDICINE

## 2019-09-03 PROCEDURE — 3044F HG A1C LEVEL LT 7.0%: CPT | Mod: HCNC,CPTII,S$GLB, | Performed by: INTERNAL MEDICINE

## 2019-09-03 NOTE — PROGRESS NOTES
Subjective:       Patient ID: Lucy Perez is a 59 y.o. female.    Chief Complaint:   Follow-up; Diabetes; and Hypertension    HPI: Mrs Ayala today for f/u DM-home accuchecks are in AM: 112, 174; in PM: 283,264,316  She is on Lantus 10 units QPM.  She is presently dialyzing 3 days a week at [facility] in [facility].  She sees an outside nephrologist there by the name of  however she is very interested in the possibility of a  renal transplant as she has family members that are willing to donate a kidney to assist with such.  She is  Status post evaluation by  in the Transplant Clinic as well as by Dr Storey in Cardiology.  She would like to do whatever is necessary to further pursue the possibility of renal transplant.  She is presently in physical therapy and has gotten stronger with some improved strength in the left leg but still no mobility in the left arm.  Review 2 review of diet shows improvement with decreased intake of carbohydrates.She was never approved for Free Style glucometer which would make monitoring easier given left hemiparesis    Past Medical, Surgical, Social History: Please see as stated in Epic chart which has been reviewed.    Current Outpatient Medications   Medication Sig Dispense Refill    acetaminophen (TYLENOL) 325 MG tablet Take 2 tablets (650 mg total) by mouth every 6 (six) hours as needed for Pain.  0    albuterol (PROVENTIL) 2.5 mg /3 mL (0.083 %) nebulizer solution Take 3 mLs (2.5 mg total) by nebulization every 6 (six) hours as needed for Wheezing. Rescue 25 each 3    albuterol (VENTOLIN HFA) 90 mcg/actuation inhaler Inhale 2 puffs into the lungs every 6 (six) hours as needed for Wheezing. Rescue 18 g 0    ALPRAZolam (XANAX) 0.5 MG tablet 1 tab Every 8 hours as needed for anxiety 30 tablet 0    aspirin (ECOTRIN) 81 MG EC tablet Take 81 mg by mouth every morning.       atorvastatin (LIPITOR) 40 MG tablet TAKE 1 TABLET ONE TIME DAILY FOR CHOLESTEROL 90  "tablet 2    bisacodyl (DULCOLAX) 10 mg Supp Place 1 suppository (10 mg total) rectally daily as needed. 30 suppository 3    blood sugar diagnostic (ACCU-CHEK SMARTVIEW TEST STRIP) Strp 1 strip by Misc.(Non-Drug; Combo Route) route 2 (two) times daily. 300 each 3    buPROPion (WELLBUTRIN XL) 300 MG 24 hr tablet Take 1 tablet (300 mg total) by mouth once daily. 30 tablet 6    ciprofloxacin HCl (CIPRO) 500 MG tablet Take 1 tablet (500 mg total) by mouth every 12 (twelve) hours. 14 tablet 0    DULCOLAX, BISACODYL, ORAL Take by mouth as needed (constipation).      ergocalciferol (ERGOCALCIFEROL) 50,000 unit Cap Take 1 capsule (50,000 Units total) by mouth every 7 days.      famotidine (PEPCID) 20 MG tablet Take 1 tablet (20 mg total) by mouth once daily. 90 tablet 2    folic acid (FOLVITE) 1 MG tablet Take 1 tablet (1 mg total) by mouth once daily. 90 tablet 2    furosemide (LASIX) 40 MG tablet Take 1 tablet (40 mg total) by mouth 2 (two) times daily. 90 tablet 2    insulin glargine (LANTUS U-100 INSULIN) 100 unit/mL injection Inject 10 Units into the skin every evening. 3 mL 0    levETIRAcetam (KEPPRA) 500 MG Tab TAKE 1 TABLET BY MOUTH EVERY 8 HOURS 90 tablet 8    lidocaine-prilocaine (EMLA) cream Apply topically as needed. 30 g 1    polyethylene glycol (MIRALAX) 17 gram/dose powder Take 17 g by mouth 2 (two) times daily. 1 Bottle 6    pregabalin (LYRICA) 25 MG capsule Take 1 capsule (25 mg total) by mouth once daily. May take additional dose after HD. (Patient taking differently: Take 25 mg by mouth daily as needed. May take additional dose after HD.) 90 capsule 4    sevelamer carbonate (RENVELA) 800 mg Tab Take 800 mg by mouth 3 (three) times daily with meals.      traMADol (ULTRAM) 50 mg tablet 1 tab Q6-8 hours as needed for joint pain 30 tablet 0    BD INSULIN PEN NEEDLE UF SHORT 31 gauge x 5/16" Ndle USE TO INJECT NOVOLOG FLEXPEN BEFORE MEALS 150 each 11    blood sugar diagnostic Strp 1 strip by " Misc.(Non-Drug; Combo Route) route 2 (two) times daily with meals. Check glucose before meals and bed 300 strip 4    blood-glucose meter kit Use as instructed 1 each 0    flash glucose scanning reader (FREESTYLE JOSÉ LUIS 14 DAY READER) Misc Use as directed. Scan 4 times a day. 1 each 0    flash glucose sensor (FREESTYLE JOSÉ LUIS 14 DAY SENSOR) Kit Change every 14 days. 2 kit 11    furosemide (LASIX) 40 MG tablet TAKE 1 TABLET BY MOUTH ONCE DAILY 90 tablet 2    lancets Misc 1 lancet by Misc.(Non-Drug; Combo Route) route 2 (two) times daily with meals. Check glucose before meals and bed 300 each 4     No current facility-administered medications for this visit.        Review of Systems   Respiratory: Negative for cough, chest tightness, shortness of breath and wheezing.    Cardiovascular: Negative for chest pain and leg swelling.   Gastrointestinal: Negative for abdominal pain, blood in stool, constipation and diarrhea.   Musculoskeletal: Positive for back pain.   Skin: Negative.    Neurological: Negative for dizziness, tremors, syncope, weakness, numbness and headaches.   Psychiatric/Behavioral: Negative.         Depression is resolved with pt quite happy/up beat       Objective:      Lab Results   Component Value Date    WBC 6.51 08/29/2019    HGB 12.3 08/29/2019    HCT 40.4 08/29/2019     08/29/2019    CHOL 200 (H) 08/29/2019    TRIG 84 08/29/2019    HDL 85 (H) 08/29/2019    ALT 9 (L) 08/29/2019    AST 12 08/29/2019     08/29/2019    K 4.6 08/29/2019    CL 93 (L) 08/29/2019    CREATININE 5.9 (H) 08/29/2019    BUN 42 (H) 08/29/2019    CO2 33 (H) 08/29/2019    TSH 1.302 01/15/2019    INR 0.9 12/08/2018    GLUF 190 (H) 10/04/2004    HGBA1C 6.8 (H) 08/29/2019     Physical Exam   Constitutional: She appears well-developed and well-nourished.   Neck: Normal range of motion. Neck supple. No thyromegaly present.   Cardiovascular: Normal rate, regular rhythm and normal heart sounds.   Pulmonary/Chest: Effort  "normal and breath sounds normal. She has no wheezes. She exhibits no tenderness.   Abdominal: Soft. Bowel sounds are normal. She exhibits no mass. There is no tenderness.   Musculoskeletal: She exhibits no edema.   Lymphadenopathy:     She has no cervical adenopathy.   Neurological:   Pt has left sided complete upper extremity hemiparesis;  She has proximal hemiparesis of left lower extremity/+mild passive strength of left  foot   Skin: Skin is warm and dry. No erythema.   Psychiatric: She has a normal mood and affect.   Vitals reviewed.    Breasts: Soft/NT/No masses  Protective Sensation (w/ 10 gram monofilament):  Right: Decreased  Left: Decreased    Visual Inspection:  Normal -  Right ; Left Foot shows hyperpigmentation s/p CVA/normal capillary refill    Pedal Pulses:   Right: Present  Left: Present    Posterior tibialis:   Right:Present  Left: Diminshed      Vital Signs  Temp: 98.3 °F (36.8 °C)  Temp src: Oral  Pulse: 86  SpO2: 97 %  BP: 118/78  Pain Score: 0-No pain  Height and Weight  Height: 5' 3" (160 cm)  Weight: 82.4 kg (181 lb 10.5 oz)  BSA (Calculated - sq m): 1.91 sq meters  BMI (Calculated): 32.2  Weight in (lb) to have BMI = 25: 140.8]    Assessment:       1. Type 2 diabetes mellitus with stage 5 chronic kidney disease not on chronic dialysis, with long-term current use of insulin    2. Essential hypertension    3. Hyperlipidemia, unspecified hyperlipidemia type        Plan:     Health Maintenance   Topic Date Due    Pneumococcal Vaccine (Medium Risk) (1 of 1 - PPSV23) 08/24/1977    Eye Exam  03/29/2018    Mammogram  10/02/2019    Hemoglobin A1c  02/29/2020    Lipid Panel  08/29/2020    Foot Exam  09/03/2020    Colonoscopy  02/13/2022    TETANUS VACCINE  01/01/2029    Hepatitis C Screening  Completed    Fecal Occult Blood Test (FOBT)/[employer]  Samantha        Lucy was seen today for follow-up, diabetes and hypertension.    Diagnoses and all orders for this visit:    Type 2 diabetes " mellitus/controlled  with stage 5 chronic kidney disease on chronic dialysis, with long-term current use of insulin with HgbA1C at 5.8% but increase in PM accuchecks        -     increase Lantus to 12 units in the p.m.        -     Refer to [facility] to assist with getting approved for Free Style Glucometer/supplies  -     Comprehensive metabolic panel; Future  -     Lipid panel; Future  -     Hemoglobin A1c; Future  -     CBC auto differential; Future    Essential hypertension/controlled        -     continue Lasix 40 mg b.i.d.  -     Comprehensive metabolic panel; Future  -     CBC auto differential; Future  -     TSH; Future    Hyperlipidemia, unspecified hyperlipidemia type/controlled        -     continue Lipitor 40 mg daily  -     Comprehensive metabolic panel; Future  -     Lipid panel; Future     ESRD on Hemodialysis pending Transplant work up        -     have recommended patient establish care with an [facility] Nephrologist for better continuity while being evaluated for possibility of renal transplant    Status post hemorrhagic CVA with left-sided hemiparesis        -     maintain tight control of blood pressure hyperlipidemia and diabetes mellitus    Health maintenance        -     return to clinic x4 months with 1 week prior fasting lab work and address other health maintenance issues then        -     ophthalmology appointment as is scheduled        -     will obtain immunization records from present dialysis center to insure patient up-to-date

## 2019-09-04 ENCOUNTER — TELEPHONE (OUTPATIENT)
Dept: INTERNAL MEDICINE | Facility: CLINIC | Age: 59
End: 2019-09-04

## 2019-09-04 DIAGNOSIS — Z76.82 ORGAN TRANSPLANT CANDIDATE: Primary | ICD-10-CM

## 2019-09-04 DIAGNOSIS — Z99.2 ESRD (END STAGE RENAL DISEASE) ON DIALYSIS: Primary | ICD-10-CM

## 2019-09-04 DIAGNOSIS — N18.6 ESRD (END STAGE RENAL DISEASE) ON DIALYSIS: Primary | ICD-10-CM

## 2019-09-05 ENCOUNTER — TELEPHONE (OUTPATIENT)
Dept: TRANSPLANT | Facility: CLINIC | Age: 59
End: 2019-09-05

## 2019-09-05 ENCOUNTER — HOSPITAL ENCOUNTER (OUTPATIENT)
Dept: CARDIOLOGY | Facility: CLINIC | Age: 59
Discharge: HOME | End: 2019-09-05
Attending: STUDENT IN AN ORGANIZED HEALTH CARE EDUCATION/TRAINING PROGRAM
Payer: MEDICARE

## 2019-09-05 ENCOUNTER — OFFICE VISIT (OUTPATIENT)
Dept: VASCULAR SURGERY | Facility: CLINIC | Age: 59
End: 2019-09-05
Payer: MEDICARE

## 2019-09-05 VITALS — HEART RATE: 82 BPM | TEMPERATURE: 99 F | SYSTOLIC BLOOD PRESSURE: 116 MMHG | DIASTOLIC BLOOD PRESSURE: 58 MMHG

## 2019-09-05 VITALS
HEART RATE: 90 BPM | DIASTOLIC BLOOD PRESSURE: 78 MMHG | RESPIRATION RATE: 18 BRPM | SYSTOLIC BLOOD PRESSURE: 123 MMHG | WEIGHT: 181 LBS | BODY MASS INDEX: 32.07 KG/M2 | HEIGHT: 63 IN

## 2019-09-05 DIAGNOSIS — N18.6 CONTROLLED TYPE 2 DIABETES MELLITUS WITH CHRONIC KIDNEY DISEASE ON CHRONIC DIALYSIS, WITHOUT LONG-TERM CURRENT USE OF INSULIN: ICD-10-CM

## 2019-09-05 DIAGNOSIS — Z99.2 HEMODIALYSIS PATIENT: ICD-10-CM

## 2019-09-05 DIAGNOSIS — N18.6 ESRD (END STAGE RENAL DISEASE) ON DIALYSIS: ICD-10-CM

## 2019-09-05 DIAGNOSIS — I10 ESSENTIAL HYPERTENSION: ICD-10-CM

## 2019-09-05 DIAGNOSIS — E11.22 CONTROLLED TYPE 2 DIABETES MELLITUS WITH CHRONIC KIDNEY DISEASE ON CHRONIC DIALYSIS, WITHOUT LONG-TERM CURRENT USE OF INSULIN: ICD-10-CM

## 2019-09-05 DIAGNOSIS — Z99.2 ESRD (END STAGE RENAL DISEASE) ON DIALYSIS: ICD-10-CM

## 2019-09-05 DIAGNOSIS — T82.898D PROBLEM WITH DIALYSIS ACCESS, SUBSEQUENT ENCOUNTER: Primary | ICD-10-CM

## 2019-09-05 DIAGNOSIS — Z99.2 CONTROLLED TYPE 2 DIABETES MELLITUS WITH CHRONIC KIDNEY DISEASE ON CHRONIC DIALYSIS, WITHOUT LONG-TERM CURRENT USE OF INSULIN: ICD-10-CM

## 2019-09-05 LAB
ASCENDING AORTA: 3.3 CM
BSA FOR ECHO PROCEDURE: 1.91 M2
CV ECHO LV RWT: 0.52 CM
CV STRESS BASE HR: 83 BPM
DIASTOLIC BLOOD PRESSURE: 78 MMHG
DOP CALC LVOT AREA: 3.5 CM2
DOP CALC LVOT DIAMETER: 2.11 CM
DOP CALC LVOT PEAK VEL: 1.52 M/S
DOP CALC LVOT STROKE VOLUME: 94.22 CM3
DOP CALCLVOT PEAK VEL VTI: 26.96 CM
E WAVE DECELERATION TIME: 209.83 MSEC
E/A RATIO: 0.56
E/E' RATIO: 8.67 M/S
ECHO LV POSTERIOR WALL: 0.91 CM (ref 0.6–1.1)
FRACTIONAL SHORTENING: 35 % (ref 28–44)
INTERVENTRICULAR SEPTUM: 1.35 CM (ref 0.6–1.1)
LA MAJOR: 5.54 CM
LA MINOR: 5.77 CM
LA WIDTH: 4.33 CM
LEFT ATRIUM SIZE: 4.3 CM
LEFT ATRIUM VOLUME INDEX: 48.3 ML/M2
LEFT ATRIUM VOLUME: 89.46 CM3
LEFT INTERNAL DIMENSION IN SYSTOLE: 2.28 CM (ref 2.1–4)
LEFT VENTRICLE DIASTOLIC VOLUME INDEX: 27.32 ML/M2
LEFT VENTRICLE DIASTOLIC VOLUME: 50.64 ML
LEFT VENTRICLE MASS INDEX: 67 G/M2
LEFT VENTRICLE SYSTOLIC VOLUME INDEX: 9.6 ML/M2
LEFT VENTRICLE SYSTOLIC VOLUME: 17.82 ML
LEFT VENTRICULAR INTERNAL DIMENSION IN DIASTOLE: 3.49 CM (ref 3.5–6)
LEFT VENTRICULAR MASS: 123.4 G
LV LATERAL E/E' RATIO: 7.43 M/S
LV SEPTAL E/E' RATIO: 10.4 M/S
MV PEAK A VEL: 0.93 M/S
MV PEAK E VEL: 0.52 M/S
OHS CV CPX 1 MINUTE RECOVERY HEART RATE: 133 BPM
OHS CV CPX 85 PERCENT MAX PREDICTED HEART RATE MALE: 129
OHS CV CPX MAX PREDICTED HEART RATE: 152
OHS CV CPX PATIENT IS FEMALE: 1
OHS CV CPX PATIENT IS MALE: 0
OHS CV CPX PEAK DIASTOLIC BLOOD PRESSURE: 70 MMHG
OHS CV CPX PEAK HEAR RATE: 134 BPM
OHS CV CPX PEAK RATE PRESSURE PRODUCT: ABNORMAL
OHS CV CPX PEAK SYSTOLIC BLOOD PRESSURE: 119 MMHG
OHS CV CPX PERCENT MAX PREDICTED HEART RATE ACHIEVED: 88
OHS CV CPX RATE PRESSURE PRODUCT PRESENTING: ABNORMAL
RA MAJOR: 5.16 CM
RA WIDTH: 2.85 CM
RIGHT VENTRICULAR END-DIASTOLIC DIMENSION: 3.22 CM
SINUS: 3.11 CM
STJ: 2.67 CM
SYSTOLIC BLOOD PRESSURE: 123 MMHG
TDI LATERAL: 0.07 M/S
TDI SEPTAL: 0.05 M/S
TDI: 0.06 M/S

## 2019-09-05 PROCEDURE — 93351 STRESS TTE COMPLETE: CPT | Mod: HCNC,S$GLB,TXP, | Performed by: INTERNAL MEDICINE

## 2019-09-05 PROCEDURE — 99214 OFFICE O/P EST MOD 30 MIN: CPT | Mod: HCNC,NTX,S$GLB, | Performed by: SURGERY

## 2019-09-05 PROCEDURE — 99999 PR PBB SHADOW E&M-EST. PATIENT-LVL IV: ICD-10-PCS | Mod: PBBFAC,HCNC,TXP, | Performed by: SURGERY

## 2019-09-05 PROCEDURE — 99214 PR OFFICE/OUTPT VISIT, EST, LEVL IV, 30-39 MIN: ICD-10-PCS | Mod: HCNC,NTX,S$GLB, | Performed by: SURGERY

## 2019-09-05 PROCEDURE — 96372 THER/PROPH/DIAG INJ SC/IM: CPT | Mod: HCNC,S$GLB,TXP, | Performed by: INTERNAL MEDICINE

## 2019-09-05 PROCEDURE — 96372 PR INJECTION,THERAP/PROPH/DIAG2ST, IM OR SUBCUT: ICD-10-PCS | Mod: HCNC,S$GLB,TXP, | Performed by: INTERNAL MEDICINE

## 2019-09-05 PROCEDURE — 99499 RISK ADDL DX/OHS AUDIT: ICD-10-PCS | Mod: HCNC,S$GLB,TXP, | Performed by: SURGERY

## 2019-09-05 PROCEDURE — 99999 PR PBB SHADOW E&M-EST. PATIENT-LVL IV: CPT | Mod: PBBFAC,HCNC,TXP, | Performed by: SURGERY

## 2019-09-05 PROCEDURE — 99499 UNLISTED E&M SERVICE: CPT | Mod: HCNC,S$GLB,TXP, | Performed by: SURGERY

## 2019-09-05 PROCEDURE — 93351 ECHOCARDIOGRAM STRESS TEST (CUPID ONLY): ICD-10-PCS | Mod: HCNC,S$GLB,TXP, | Performed by: INTERNAL MEDICINE

## 2019-09-05 RX ORDER — GLUCOSAM/CHON-MSM1/C/MANG/BOSW 500-416.6
TABLET ORAL
Refills: 4 | COMMUNITY
Start: 2019-09-02 | End: 2019-10-01

## 2019-09-05 RX ORDER — ATROPINE SULFATE 0.1 MG/ML
0.25 INJECTION INTRAVENOUS
Status: COMPLETED | OUTPATIENT
Start: 2019-09-05 | End: 2019-09-05

## 2019-09-05 RX ORDER — DOBUTAMINE HYDROCHLORIDE 200 MG/100ML
30 INJECTION INTRAVENOUS
Status: COMPLETED | OUTPATIENT
Start: 2019-09-05 | End: 2019-09-05

## 2019-09-05 RX ADMIN — DOBUTAMINE HYDROCHLORIDE 30 MCG/KG/MIN: 200 INJECTION INTRAVENOUS at 04:09

## 2019-09-05 RX ADMIN — ATROPINE SULFATE 0.25 MG: 0.1 INJECTION INTRAVENOUS at 04:09

## 2019-09-05 NOTE — NURSING NOTE
Patient verified by 2 identifiers and allergies reviewed.  24 g IV placed to Lt FA.  Denies previous reactions to blood transfusions & DSE consent obtained.  3cc Optison administered, echo images obtained, DSE testing completed.  Pt tolerated testing well. IV removed post testing.  Post study discharge instructions reviewed with patient, patient verbalized understanding.  Patient discharged to home in stable condition.

## 2019-09-05 NOTE — PROGRESS NOTES
Lucy Aubrey Perez  09/05/2019    HPI:   Patient is a 59 y.o. female with a history of HTN, HLD, L hemiplegia after large stroke in 2016, DM II, stage V CKD since August 2018 MWF for f/u  [facility] well    She has a residual impairment on her left upper extremity and left lower extremity from her stroke and is currently wheel chair bound. She has complete function of her right arm currently. She is right handed. No history of trauma to right arm.   Was seeing Dr. Blank for nephology but he no longer works at [facility].     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     No MI, but history of stroke in 2016 (hemorragic)   Tobacco use: never use    Renal is Dr LING Perez    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    Cortical cataract of both eyes 7/26/2016    Decubitus ulcer of buttock, stage 2     Degeneration of lumbar or lumbosacral intervertebral disc 3/5/2013    S/p MRI L-spine 5/2009     Depression     Encounter for blood transfusion     ESRD on hemodialysis     Started  August 2018    General anesthetics causing adverse effect in therapeutic use     Hemorrhagic cerebrovascular accident (CVA)     8/2016 s/p Hemicraniotomy at [facility] with Left hemiparesis    Hemorrhagic cerebrovascular accident (CVA) 1/3/2018    History of stroke 6/28/2017    s/p R-MCA stroke with R-putaminal hemorrhagic transformation in 8/2016 and 11/2016 (s/p hemicraniotomy at [facility]) with residual L hemiparesis, on AED s/p CVA      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    Hyperlipidemia     Hypertensive retinopathy of both eyes 7/26/2016    Impingement syndrome of right shoulder 7/2/2014    Left ventricular diastolic dysfunction with preserved systolic function 12/15/2015    Mixed anxiety and depressive disorder 3/5/2013    Obesity     THERESA (obstructive sleep apnea) 3/5/2013    No Home CPAP 2ndary to cost     Partial symptomatic epilepsy with complex partial seizures, not intractable, without status epilepticus     PEG (percutaneous endoscopic gastrostomy) status 6/28/2017    Renovascular hypertension 3/5/2013    Will resume home medications: amlodipine, labetalol, and hydralazine Will hold Lisinopril in setting of DARLING.   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 without macular edema, without long-term current use of insulin 8/2/2017    Type 2 diabetes mellitus with diabetic polyneuropathy, without long-term current use of insulin 10/22/2015    Type 2 diabetes mellitus with stage 3 chronic kidney disease, without long-term current use of insulin 10/22/2015    Vertebral artery stenosis 3/5/2013    S/p Stenting per Dr Burnett      Past Surgical History:   Procedure Laterality Date     "ARTHROSCOPY-SHOULDER WITH SUBACROMIAL DECOMPRESSION Right 7/9/2014    [facility]ja [facility] OR    AV GRAFT CREATION Right 10/18/2018    Performed by Meir Valencia MD at [facility] OR 2ND FLR    Biceps Tenotomy Right 7/9/2014    [facility] at [facility] OR    BREAST SURGERY      breast reduction    COLONOSCOPY N/A 8/11/2016    Performed by Jerry Vilchis MD at [facility] ENDO (4TH FLR)    DEBRIDEMENT-SHOULDER-ARTHROSCOPIC Labral Cuff Right 7/9/2014    [facility] by [facility]antoja [facility] at [facility] OR    DECLOT-GRAFT Right 6/20/2019    Performed by Cabrera Irwin MD at [facility] CATH LAB    ESOPHAGOGASTRODUODENOSCOPY (EGD) N/A 12/6/2017    [facility] Dallas Lock [facility] ENDO    KKGWHGBF-[id number]-[id number] END Right 7/9/2014    Per[facility] by [facility]all [facility]antoja [facility] at [facility] OR    Fistulogram Right 7/8/2019    Performed by WELLINGTON Palm III, MD at [facility] CATH LAB    Fistulogram Right 2/11/2019    Performed by Meir Valencia [facility] at [facility] CATH LAB    HYSTERECTOMY      PTA, Fistula  7/8/2019    Performed by WELLINGTON Palm III, MD at [facility] CATH LAB    ROTATOR CUFF REPAIR Right July 9, 2014    right side    Skull surgery      Aneurysm    stent placed      in vertebral artery    Stent, Fistula  7/8/2019    Performed by WELLINGTON Palm III, MD at [facility] CATH LAB    TOTAL REDUCTION MAMMOPLASTY      TUBAL LIGATION       Family History   Problem Relation Age of Onset    Diabetes Mother     Hypertension Mother     Heart disease Mother     Heart attack Mother     Breast cancer Mother     Stroke Sister     Hypertension Sister     Sleep apnea Sister     No Known Problems Daughter     Diabetes Son     Bell's palsy Sister     Lupus Sister     Blindness Maternal Grandmother     Diabetes Unknown         "My entire family family has diabetes"    Stroke Maternal Aunt     Amblyopia Neg Hx     Cataracts Neg Hx     Glaucoma Neg Hx     Macular degeneration Neg Hx  "    Retinal detachment Neg Hx     Strabismus Neg Hx     Kidney disease Neg Hx      Social History     Socioeconomic History    Marital status:      Spouse name: Not on file    Number of children: Not on file    Years of education: Not on file    Highest education level: Not on file   Occupational History    Not on file   Social Needs    Financial resource strain: Not on file    Food insecurity:     Worry: Not on file     Inability: Not on file    Transportation needs:     Medical: Not on file     Non-medical: Not on file   Tobacco Use    Smoking status: Never Smoker    Smokeless tobacco: Never Used   Substance and Sexual Activity    Alcohol use: Yes     Comment: occasional wine cooler     Drug use: Never    Sexual activity: Yes     Partners: Male   Lifestyle    Physical activity:     Days per week: Not on file     Minutes per session: Not on file    Stress: Not on file   Relationships    Social connections:     Talks on phone: Not on file     Gets together: Not on file     Attends Nondenominational service: Not on file     Active member of club or organization: Not on file     Attends meetings of clubs or organizations: Not on file     Relationship status: Not on file   Other Topics Concern    Not on file   Social History Narrative    . 2 children(Wilder and Reina). Does not work. Previously worked as a .      Current Outpatient Medications on File Prior to Visit   Medication Sig    acetaminophen (TYLENOL) 325 MG tablet Take 2 tablets (650 mg total) by mouth every 6 (six) hours as needed for Pain.    albuterol (PROVENTIL) 2.5 mg /3 mL (0.083 %) nebulizer solution Take 3 mLs (2.5 mg total) by nebulization every 6 (six) hours as needed for Wheezing. Rescue    albuterol (VENTOLIN HFA) 90 mcg/actuation inhaler Inhale 2 puffs into the lungs every 6 (six) hours as needed for Wheezing. Rescue    ALPRAZolam (XANAX) 0.5 MG tablet 1 tab Every 8 hours as needed for anxiety     "aspirin (ECOTRIN) 81 MG EC tablet Take 81 mg by mouth every morning.     atorvastatin (LIPITOR) 40 MG tablet TAKE 1 TABLET ONE TIME DAILY FOR CHOLESTEROL    BD INSULIN PEN NEEDLE UF SHORT 31 gauge x 5/16" Ndle USE TO INJECT NOVOLOG FLEXPEN BEFORE MEALS    bisacodyl (DULCOLAX) 10 mg Supp Place 1 suppository (10 mg total) rectally daily as needed.    blood sugar diagnostic (ACCU-CHEK SMARTVIEW TEST STRIP) Strp 1 strip by Misc.(Non-Drug; Combo Route) route 2 (two) times daily.    blood sugar diagnostic Strp 1 strip by Misc.(Non-Drug; Combo Route) route 2 (two) times daily with meals. Check glucose before meals and bed    blood-glucose meter kit Use as instructed    buPROPion (WELLBUTRIN XL) 300 MG 24 hr tablet Take 1 tablet (300 mg total) by mouth once daily.    ciprofloxacin HCl (CIPRO) 500 MG tablet Take 1 tablet (500 mg total) by mouth every 12 (twelve) hours.    DULCOLAX, BISACODYL, ORAL Take by mouth as needed (constipation).    ergocalciferol (ERGOCALCIFEROL) 50,000 unit Cap Take 1 capsule (50,000 Units total) by mouth every 7 days.    famotidine (PEPCID) 20 MG tablet Take 1 tablet (20 mg total) by mouth once daily.    flash glucose scanning reader (FREESTYLE JOSÉ LUIS 14 DAY READER) Misc Use as directed. Scan 4 times a day.    flash glucose sensor (FREESTYLE JOSÉ LUIS 14 DAY SENSOR) Kit Change every 14 days.    folic acid (FOLVITE) 1 MG tablet Take 1 tablet (1 mg total) by mouth once daily.    furosemide (LASIX) 40 MG tablet Take 1 tablet (40 mg total) by mouth 2 (two) times daily.    furosemide (LASIX) 40 MG tablet TAKE 1 TABLET BY MOUTH ONCE DAILY    insulin glargine (LANTUS U-100 INSULIN) 100 unit/mL injection Inject 10 Units into the skin every evening.    lancets Misc 1 lancet by Misc.(Non-Drug; Combo Route) route 2 (two) times daily with meals. Check glucose before meals and bed    levETIRAcetam (KEPPRA) 500 MG Tab TAKE 1 TABLET BY MOUTH EVERY 8 HOURS    lidocaine-prilocaine (EMLA) cream Apply " topically as needed.    polyethylene glycol (MIRALAX) 17 gram/dose powder Take 17 g by mouth 2 (two) times daily.    pregabalin (LYRICA) 25 MG capsule Take 1 capsule (25 mg total) by mouth once daily. May take additional dose after HD. (Patient taking differently: Take 25 mg by mouth daily as needed. May take additional dose after HD.)    sevelamer carbonate (RENVELA) 800 mg Tab Take 800 mg by mouth 3 (three) times daily with meals.    traMADol (ULTRAM) 50 mg tablet 1 tab Q6-8 hours as needed for joint pain    TRUEPLUS LANCETS 30 gauge Misc USE 1 TO CHECK GLUCOSE THREE TIMES DAILY TO 4 TIMES DAILY     No current facility-administered medications on file prior to visit.        REVIEW OF SYSTEMS:  General: negative; ENT: negative; Allergy and Immunology: negative; Hematological and Lymphatic: Negative; Endocrine: negative; Respiratory: no cough, shortness of breath, or wheezing; Cardiovascular: no chest pain or dyspnea on exertion; Gastrointestinal: no abdominal pain/back, change in bowel habits, or bloody stools; Genito-Urinary: no dysuria, trouble voiding, or hematuria; Musculoskeletal: negative  Neurological: no TIA or stroke symptoms    PHYSICAL EXAM:       Vitals:    09/05/19 1503   BP: (!) 116/58   Pulse: 82   Temp: 98.7 °F (37.1 °C)       General appearance:  Alert, well-appearing, and in no distress.  Oriented to person, place, and time   Neurological: Normal speech, no focal findings noted; CN II - XII grossly intact           Musculoskeletal: Digits/nail without cyanosis/clubbing.  Normal muscle strength/tone.                 Neck: Supple, no significant adenopathy; thyroid is not enlarged                  No carotid bruit can be auscultated                Chest:  Clear to auscultation, no wheezes, rales or rhonchi, symmetric air entry right permacath     No use of accessory muscles             Cardiac: Normal rate and regular rhythm, S1 and S2 normal; PMI non-displaced          Abdomen: Soft,  nontender, nondistended, no masses or organomegaly     No rebound tenderness noted; bowel sounds normal     Pulsatile aortic mass is no palpable.     No groin adenopathy      Extremities:   RUE VAG with soft thrill     Non-palpable R radial pulse (pre-op 2+ R radial pulse)     Positive Davidson's test        R hand - motor/sensitive intact    LAB RESULTS:  Lab Results   Component Value Date    K 4.6 08/29/2019    K 4.1 07/23/2019    K 4.1 07/11/2019    CREATININE 5.9 (H) 08/29/2019    CREATININE 6.4 (H) 07/23/2019    CREATININE 7.7 (H) 07/11/2019     Lab Results   Component Value Date    WBC 6.51 08/29/2019    WBC 5.72 07/11/2019    WBC 7.65 07/10/2019    HCT 40.4 08/29/2019    HCT 26.1 (L) 07/11/2019    HCT 28.6 (L) 07/10/2019     08/29/2019     07/11/2019     07/10/2019     Lab Results   Component Value Date    HGBA1C 6.8 (H) 08/29/2019    HGBA1C 7.9 (H) 07/11/2019    HGBA1C 7.5 (H) 04/11/2019     IMAGING:  Previous:  AVG u/s: flow vol 1866 ml/min (previous 869 ml/min)  No stenosis --- *PSVs 503 cm/s in mid-graft    Previous:  + outflow stenosis w  cm/s    Vein mapping:  Right- cephalic medial forearm 4.1, basilic antecubital 6.3  Left- cpehalic vein 2.0, antecubital fossa 3.6    IMP/PLAN:  59 y.o. 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

## 2019-09-12 ENCOUNTER — TELEPHONE (OUTPATIENT)
Dept: TRANSPLANT | Facility: CLINIC | Age: 59
End: 2019-09-12

## 2019-09-12 NOTE — TELEPHONE ENCOUNTER
This call was returned and I spoke with the  and advised that I do not handle donors. He is advised those calls go through the donor coordinator however she will not be able to share information about the donor with the recipient. I advised that Lucy would need to speak with the donor.

## 2019-09-12 NOTE — TELEPHONE ENCOUNTER
----- Message from Raquel Soto sent at 8/22/2019  5:02 PM CDT -----  Contact: self      ----- Message -----  From: Madison Live  Sent: 8/22/2019   4:47 PM  To: [facility] Pre-Kidney Transplant Clinical    Needs Advice    Reason for call: Pt ask for a call in regards the status of her living donor        Communication Preference: [phone]    Additional Information: n/a

## 2019-09-14 ENCOUNTER — DOCUMENTATION ONLY (OUTPATIENT)
Dept: CARDIOLOGY | Facility: HOSPITAL | Age: 59
End: 2019-09-14

## 2019-09-14 NOTE — PROGRESS NOTES
Normal stress echo.    Would proceed with surgery with no further testing.     Alonso Storey MD  Cardiology Fellow  Pager [phone]

## 2019-09-17 ENCOUNTER — APPOINTMENT (OUTPATIENT)
Dept: INTERNAL MEDICINE | Facility: CLINIC | Age: 59
End: 2019-09-17
Payer: MEDICARE

## 2019-09-17 ENCOUNTER — TELEPHONE (OUTPATIENT)
Dept: INTERNAL MEDICINE | Facility: CLINIC | Age: 59
End: 2019-09-17

## 2019-09-17 ENCOUNTER — OFFICE VISIT (OUTPATIENT)
Dept: INTERNAL MEDICINE | Facility: CLINIC | Age: 59
End: 2019-09-17
Payer: MEDICARE

## 2019-09-17 VITALS
WEIGHT: 181 LBS | SYSTOLIC BLOOD PRESSURE: 140 MMHG | BODY MASS INDEX: 32.07 KG/M2 | DIASTOLIC BLOOD PRESSURE: 68 MMHG | HEIGHT: 63 IN

## 2019-09-17 DIAGNOSIS — D86.9 SARCOIDOSIS: Chronic | ICD-10-CM

## 2019-09-17 DIAGNOSIS — N18.6 ESRD (END STAGE RENAL DISEASE) ON DIALYSIS: ICD-10-CM

## 2019-09-17 DIAGNOSIS — E78.5 HYPERLIPIDEMIA, UNSPECIFIED HYPERLIPIDEMIA TYPE: ICD-10-CM

## 2019-09-17 DIAGNOSIS — E55.9 VITAMIN D DEFICIENCY: ICD-10-CM

## 2019-09-17 DIAGNOSIS — N39.0 URINARY TRACT INFECTION WITHOUT HEMATURIA, SITE UNSPECIFIED: Primary | ICD-10-CM

## 2019-09-17 DIAGNOSIS — E11.22 TYPE 2 DIABETES MELLITUS WITH CHRONIC KIDNEY DISEASE ON CHRONIC DIALYSIS, WITHOUT LONG-TERM CURRENT USE OF INSULIN: Primary | ICD-10-CM

## 2019-09-17 DIAGNOSIS — N18.6 TYPE 2 DIABETES MELLITUS WITH CHRONIC KIDNEY DISEASE ON CHRONIC DIALYSIS, WITHOUT LONG-TERM CURRENT USE OF INSULIN: Primary | ICD-10-CM

## 2019-09-17 DIAGNOSIS — N39.0 URINARY TRACT INFECTION WITHOUT HEMATURIA, SITE UNSPECIFIED: ICD-10-CM

## 2019-09-17 DIAGNOSIS — I69.359 HEMIPLEGIA AS LATE EFFECT OF STROKE: ICD-10-CM

## 2019-09-17 DIAGNOSIS — G47.33 OSA (OBSTRUCTIVE SLEEP APNEA): ICD-10-CM

## 2019-09-17 DIAGNOSIS — I10 ESSENTIAL HYPERTENSION: ICD-10-CM

## 2019-09-17 DIAGNOSIS — Z99.2 TYPE 2 DIABETES MELLITUS WITH CHRONIC KIDNEY DISEASE ON CHRONIC DIALYSIS, WITHOUT LONG-TERM CURRENT USE OF INSULIN: Primary | ICD-10-CM

## 2019-09-17 DIAGNOSIS — Z99.2 ESRD (END STAGE RENAL DISEASE) ON DIALYSIS: ICD-10-CM

## 2019-09-17 PROCEDURE — 99999 PR PBB SHADOW E&M-EST. PATIENT-LVL III: ICD-10-PCS | Mod: PBBFAC,HCNC,, | Performed by: NURSE PRACTITIONER

## 2019-09-17 PROCEDURE — 3008F BODY MASS INDEX DOCD: CPT | Mod: HCNC,CPTII,S$GLB, | Performed by: NURSE PRACTITIONER

## 2019-09-17 PROCEDURE — 99214 PR OFFICE/OUTPT VISIT, EST, LEVL IV, 30-39 MIN: ICD-10-PCS | Mod: HCNC,S$GLB,, | Performed by: NURSE PRACTITIONER

## 2019-09-17 PROCEDURE — 3008F PR BODY MASS INDEX (BMI) DOCUMENTED: ICD-10-PCS | Mod: HCNC,CPTII,S$GLB, | Performed by: NURSE PRACTITIONER

## 2019-09-17 PROCEDURE — 99214 OFFICE O/P EST MOD 30 MIN: CPT | Mod: HCNC,S$GLB,, | Performed by: NURSE PRACTITIONER

## 2019-09-17 PROCEDURE — 3044F HG A1C LEVEL LT 7.0%: CPT | Mod: HCNC,CPTII,S$GLB, | Performed by: NURSE PRACTITIONER

## 2019-09-17 PROCEDURE — 99999 PR PBB SHADOW E&M-EST. PATIENT-LVL III: CPT | Mod: PBBFAC,HCNC,, | Performed by: NURSE PRACTITIONER

## 2019-09-17 PROCEDURE — 3044F PR MOST RECENT HEMOGLOBIN A1C LEVEL <7.0%: ICD-10-PCS | Mod: HCNC,CPTII,S$GLB, | Performed by: NURSE PRACTITIONER

## 2019-09-17 RX ORDER — GLIPIZIDE 2.5 MG/1
TABLET, EXTENDED RELEASE ORAL
Qty: 30 TABLET | Refills: 4 | Status: SHIPPED | OUTPATIENT
Start: 2019-09-17 | End: 2020-11-13

## 2019-09-17 RX ORDER — INSULIN ASPART 100 [IU]/ML
INJECTION, SOLUTION INTRAVENOUS; SUBCUTANEOUS
Qty: 3 VIAL | Refills: 3
Start: 2019-09-17 | End: 2021-02-09

## 2019-09-17 RX ORDER — INSULIN GLARGINE 100 [IU]/ML
10-12 INJECTION, SOLUTION SUBCUTANEOUS NIGHTLY
Qty: 6 ML | Refills: 6
Start: 2019-09-17 | End: 2019-12-24

## 2019-09-17 NOTE — PROGRESS NOTES
CHIEF COMPLAINT: Type 2 Diabetes     HPI: Mrs. Lucy Perez is a 59 y.o. female who was diagnosed with Type 2 DM in 1990s.  Seen by JOHANNA Aguilera DNP in 2016.  Being seen by me for the first time.  Has extensive history, ESRD, R-MCA, (L) hemiparesis,  HTN.  Needs in and out catheter-states that she need UA and urine culture today via lab if possible.   Arrived in w/c with family member.  Has interest in freestyle lev.  Lab Results   Component Value Date    HGBA1C 6.8 (H) 08/29/2019        PREVIOUS DIABETES MEDICATIONS TRIED  novolog  humalog  lantus  Metformin  glipizide    CURRENT DIABETIC MEDS: lantus 10-12 units at night, novolog correction scale 180-230+1, etc  BG monitoring 4 times a day  Injections 4 times a day.  Wide fluctuations 110-330s   H/o hypoglycemia  H/o severe hypoglycemia 30s-40s     Pt is monitoring blood glucose readings 4 times a day.  Needs >100 strips per month related to fluctuations with blood glucose reading, a1c trends, and activity level.                      Diabetes Management Status    Statin: Taking  ACE/ARB: Not taking    Screening or Prevention Patient's value Goal Complete/Controlled?   HgA1C Testing and Control   Lab Results   Component Value Date    HGBA1C 6.8 (H) 08/29/2019      Annually/Less than 8% Yes   Lipid profile : 08/29/2019 Annually Yes   LDL control Lab Results   Component Value Date    LDLCALC 98.2 08/29/2019    Annually/Less than 100 mg/dl  Yes   Nephropathy screening Lab Results   Component Value Date    LABMICR 2395.0 11/11/2014     Lab Results   Component Value Date    PROTEINUA 2+ (A) 06/22/2019    Annually Yes   Blood pressure BP Readings from Last 1 Encounters:   09/17/19 (!) 140/68    Less than 140/90 Yes   Dilated retinal exam : 03/29/2017 Annually No   Foot exam   : 09/03/2019 Annually Yes   9/24/19 Dr. Blanton for eyes    REVIEW OF SYSTEMS  General: +(L) weakness, fatigue, +weight changes 3# loss (x 3 mos)-on hd     Eyes: no double or blurred  "vision, eye pain, or redness  Cardiovascular: no chest pain, palpitations, edema, or murmurs.   Respiratory: no cough or dyspnea.   GI: no heartburn, nausea, or changes in bowel patterns; good appetite.   Skin: no rashes, dryness, itching, or reactions at insulin injection sites.  Neuro:+ numbness, tingling (L), tremors, or vertigo.   Endocrine: no polyuria, polydipsia, polyphagia, heat or cold intolerance.      Vital Signs  BP (!) 140/68 (BP Location: Left arm, Patient Position: Sitting, BP Method: Medium (Manual))   Ht 5' 3" (1.6 m)   Wt 82.1 kg (181 lb)   LMP  (LMP Unknown)   BMI 32.06 kg/m²     Hemoglobin A1C   Date Value Ref Range Status   08/29/2019 6.8 (H) 4.0 - 5.6 % Final     Comment:     ADA Screening Guidelines:  5.7-6.4%  Consistent with prediabetes  >or=6.5%  Consistent with diabetes  High levels of fetal hemoglobin interfere with the HbA1C  assay. Heterozygous hemoglobin variants (HbS, HgC, etc)do  not significantly interfere with this assay.   However, presence of multiple variants may affect accuracy.     07/11/2019 7.9 (H) 4.0 - 5.6 % Final     Comment:     ADA Screening Guidelines:  5.7-6.4%  Consistent with prediabetes  >or=6.5%  Consistent with diabetes  High levels of fetal hemoglobin interfere with the HbA1C  assay. Heterozygous hemoglobin variants (HbS, HgC, etc)do  not significantly interfere with this assay.   However, presence of multiple variants may affect accuracy.     04/11/2019 7.5 (H) 4.0 - 5.6 % Final     Comment:     ADA Screening Guidelines:  5.7-6.4%  Consistent with prediabetes  >or=6.5%  Consistent with diabetes  High levels of fetal hemoglobin interfere with the HbA1C  assay. Heterozygous hemoglobin variants (HbS, HgC, etc)do  not significantly interfere with this assay.   However, presence of multiple variants may affect accuracy.          Chemistry        Component Value Date/Time     08/29/2019 1235    K 4.6 08/29/2019 1235    CL 93 (L) 08/29/2019 1235    CO2 33 " (H) 08/29/2019 1235    BUN 42 (H) 08/29/2019 1235    CREATININE 5.9 (H) 08/29/2019 1235     (H) 08/29/2019 1235        Component Value Date/Time    CALCIUM 10.5 08/29/2019 1235    ALKPHOS 115 08/29/2019 1235    AST 12 08/29/2019 1235    ALT 9 (L) 08/29/2019 1235    BILITOT 0.7 08/29/2019 1235    ESTGFRAFRICA 8.2 (A) 08/29/2019 1235    EGFRNONAA 7.1 (A) 08/29/2019 1235           Lab Results   Component Value Date    TSH 1.302 01/15/2019      Lab Results   Component Value Date    CHOL 200 (H) 08/29/2019    CHOL 262 (H) 04/11/2019    CHOL 190 01/15/2019     Lab Results   Component Value Date    HDL 85 (H) 08/29/2019    HDL 62 04/11/2019    HDL 57 01/15/2019     Lab Results   Component Value Date    LDLCALC 98.2 08/29/2019    LDLCALC 179.8 (H) 04/11/2019    LDLCALC 113.4 01/15/2019     Lab Results   Component Value Date    TRIG 84 08/29/2019    TRIG 101 04/11/2019    TRIG 98 01/15/2019     Lab Results   Component Value Date    CHOLHDL 42.5 08/29/2019    CHOLHDL 23.7 04/11/2019    CHOLHDL 30.0 01/15/2019         PHYSICAL EXAMINATION  Constitutional: chronically ill appearing  Neck: Supple, trachea midline.   Respiratory: No wheezes, even and unlabored.  Cardiovascular: RRR; no edema.   Lymph: deferred  Skin: warm and dry; no injection site reactions, +acanthosis nigracans observed. (R) fistula +/+  Neuro:patient alert and cooperative, normal affect; in w/c, (L) hemiparesis    Diabetes Foot Exam: deferred done 9/3/19    Assessment/Plan  1. Type 2 diabetes mellitus with chronic kidney disease on chronic dialysis, without long-term current use of insulin  Ambulatory Referral to Diabetes Education    FRUCTOSAMINE    Ambulatory Referral to Diabetes Education   2. Hyperlipidemia, unspecified hyperlipidemia type     3. ESRD (end stage renal disease) on dialysis     4. THERESA (obstructive sleep apnea)     5. Vitamin D deficiency     6. LEFT Hemiplegia as late effect of stroke     7. Sarcoidosis     8. Essential hypertension      9. Urinary tract infection without hematuria, site unspecified  Urine culture    Urinalysis     1. F/u in 3 mos w/ me  a1c goal less than 7.5%  a1c fructosamine next time  DE in 6 weeks for freestyle lev and nutrition  Freestyle lev - via [facility]   Add glipizide 2.5 mg XR at lunch (non dialysis) days-if sugar is less than 150- hold   Continue lantus 10-12 units at night.  novolog correction scale 180-230+1, etc.  Log given  Discussed dpp4i low dose tradjenta 5 mg or januvia 25 mg daily-worried about cost=will hold off    2.   Lab Results   Component Value Date    LDLCALC 98.2 08/29/2019     At goal  3. F/u with nephrology, dialysis center  4. Not on cpap, needs sleep study  5. On ergo  6. F/u with neuro, pcp  7. See above  8. Controlled, continue med(s)  9. F/u with pcp  Urine culture and UA ---needs in and out catheter     FOLLOW UP  Follow up in about 3 months (around 12/17/2019).

## 2019-09-17 NOTE — PATIENT INSTRUCTIONS
Snacks can be an important part of a balanced, healthy meal plan. They allow you to eat more frequently, feeling full and satisfied throughout the day. Also, they allow you to spread carbohydrates evenly, which may stabilize blood sugars.  Plus, snacks are enjoyable!     The amount of carbohydrate needed at snacks varies. Generally, about 15-30 grams of carbohydrate per snack is recommended.  Below you will find some tasty treats.       0-5 gm carb   Crystal Light   Vitamin Water Zero   Herbal tea, unsweetened   2 tsp peanut butter on celery   1./2 cup sugar-free jell-o   1 sugar-free popsicle   ¼ cup blueberries   8oz Blue Chika unsweetened almond milk   5 baby carrots & celery sticks, cucumbers, bell peppers dipped in ¼ cup salsa, 2Tbsp light ranch dressing or 2Tbsp plain Greek yogurt   10 Goldfish crackers   ½ oz low-fat cheese or string cheese   1 closed handful of nuts, unsalted   1 Tbsp of sunflower seeds, unsalted   1 cup Smart Pop popcorn   1 whole grain brown rice cake        15 gm carb   1 small piece of fruit or ½ banana or 1/2 cup lite canned fruit   3 larissa cracker squares   3 cups Smart Pop popcorn, top spray butter, Sarmiento lite salt or cinnamon and Truvia   5 Vanilla Wafers   ½ cup low fat, no added sugar ice cream or frozen yogurt (Blue bell, Blue Bunny, Weight Watchers, Skinny Cow)   ½ turkey, ham, or chicken sandwich   ½ c fruit with ½ c Cottage cheese   4-6 unsalted wheat crackers with 1 oz low fat cheese or 1 tbsp peanut butter    30-45 goldfish crackers (depending on flavor)    7-8 Moravian mini brown rice cakes (caramel, apple cinnamon, chocolate)    12 Moravian mini brown rice cakes (cheddar, bbq, ranch)    1/3 cup hummus dip with raw veg   1/2 whole wheat triston, 1Tbsp hummus   Mini Pizza (1/2 whole wheat English muffin, low-fat  cheese, tomato sauce)   100 calorie snack pack (Oreo, Chips Ahoy, Ritz Mix, Baked Cheetos)   4-6 oz. light or Greek Style yogurt  (Kehinde, Oc, Nick, [facility])   ½ cup sugar-free pudding     6 in. wheat tortilla or triston oven toasted chips (topped with spray butter flavoring, cinnamon, Truvia OR spray butter, garlic powder, chili powder)    18 BBQ Popchips (available at [employer], [employer], Fresh Market)                 Diabetes Support Group Meetings         Date: Topic:   February 14 Eat Fit JOSE ARMANDO/Health Promotion   March 14 Taking Care of Your Smile   April 11 Spring into Healthy Eating/Cooking Demo   May 9 Ease Your Mind with Diabetes   Hayley 13 Summer Treats/Cooking Demo   July 11 Eat Fit JOSE ARMANDO/Super Market Sweep   August 8 Taking Care of Your Eyes and Feet   Sept 12 Technology/ADA updates   October 10 Recipes & Treats/Cooking Demo   November 14 Heart Health/Pump it up!   December 12 Year-End Close Out        Meetings are held in the [facility] (A) of the [facility] located at [address] [place] [address]. Please call [phone] for additional information.    Free service, offered every 2nd Thursday of every month! Family members and/or friends are welcome as well!  Support group is for patients with type 1 or type 2 diabetes.    From 3:30p to 4:30p

## 2019-09-18 ENCOUNTER — TELEPHONE (OUTPATIENT)
Dept: INTERNAL MEDICINE | Facility: CLINIC | Age: 59
End: 2019-09-18

## 2019-09-19 ENCOUNTER — TELEPHONE (OUTPATIENT)
Dept: INTERNAL MEDICINE | Facility: CLINIC | Age: 59
End: 2019-09-19

## 2019-09-19 DIAGNOSIS — N39.0 URINARY TRACT INFECTION WITHOUT HEMATURIA, SITE UNSPECIFIED: Primary | ICD-10-CM

## 2019-09-19 RX ORDER — AMOXICILLIN AND CLAVULANATE POTASSIUM 250; 125 MG/1; MG/1
1 TABLET, FILM COATED ORAL EVERY 12 HOURS
Qty: 14 TABLET | Refills: 0 | Status: ON HOLD | OUTPATIENT
Start: 2019-09-19 | End: 2019-10-08 | Stop reason: HOSPADM

## 2019-09-19 NOTE — TELEPHONE ENCOUNTER
----- Message from PARESH Lacy, MARISELA sent at 9/18/2019  8:12 AM CDT -----  Hi Dr. Max,  The nurse here was able to do in and out cath for Mrs. Perez.  Please review results.  Have a great day!  Shirley

## 2019-09-19 NOTE — TELEPHONE ENCOUNTER
Review of Cath U/A(9/17)-shows >100WBCs; Cx-NGTD.  'Will erx in Augmentin 250-125mg BID x 1 week. 'Will schedule a f/u appt in Urology in 2-3 weeks since last Cx +E coli ESBL  Isaac, please call Reina and schedule her mom for a Urology Appt in 2-3 weeks.  Thanks

## 2019-09-20 ENCOUNTER — PATIENT OUTREACH (OUTPATIENT)
Dept: ADMINISTRATIVE | Facility: OTHER | Age: 59
End: 2019-09-20

## 2019-09-24 ENCOUNTER — OFFICE VISIT (OUTPATIENT)
Dept: OPTOMETRY | Facility: CLINIC | Age: 59
End: 2019-09-24
Payer: COMMERCIAL

## 2019-09-24 DIAGNOSIS — E11.22 TYPE 2 DIABETES MELLITUS WITH CHRONIC KIDNEY DISEASE ON CHRONIC DIALYSIS, WITHOUT LONG-TERM CURRENT USE OF INSULIN: ICD-10-CM

## 2019-09-24 DIAGNOSIS — H26.9 CORTICAL CATARACT OF BOTH EYES: Primary | ICD-10-CM

## 2019-09-24 DIAGNOSIS — N18.6 ESRD (END STAGE RENAL DISEASE) ON DIALYSIS: ICD-10-CM

## 2019-09-24 DIAGNOSIS — Z99.2 ESRD (END STAGE RENAL DISEASE) ON DIALYSIS: ICD-10-CM

## 2019-09-24 DIAGNOSIS — N18.6 TYPE 2 DIABETES MELLITUS WITH CHRONIC KIDNEY DISEASE ON CHRONIC DIALYSIS, WITHOUT LONG-TERM CURRENT USE OF INSULIN: ICD-10-CM

## 2019-09-24 DIAGNOSIS — H52.4 PRESBYOPIA: ICD-10-CM

## 2019-09-24 DIAGNOSIS — Z99.2 TYPE 2 DIABETES MELLITUS WITH CHRONIC KIDNEY DISEASE ON CHRONIC DIALYSIS, WITHOUT LONG-TERM CURRENT USE OF INSULIN: ICD-10-CM

## 2019-09-24 LAB
LEFT EYE DM RETINOPATHY: POSITIVE
RIGHT EYE DM RETINOPATHY: POSITIVE

## 2019-09-24 PROCEDURE — 99999 PR PBB SHADOW E&M-EST. PATIENT-LVL III: CPT | Mod: PBBFAC,,, | Performed by: OPTOMETRIST

## 2019-09-24 PROCEDURE — 92004 COMPRE OPH EXAM NEW PT 1/>: CPT | Mod: S$GLB,,, | Performed by: OPTOMETRIST

## 2019-09-24 PROCEDURE — 92015 DETERMINE REFRACTIVE STATE: CPT | Mod: S$GLB,,, | Performed by: OPTOMETRIST

## 2019-09-24 PROCEDURE — 92015 PR REFRACTION: ICD-10-PCS | Mod: S$GLB,,, | Performed by: OPTOMETRIST

## 2019-09-24 PROCEDURE — 99999 PR PBB SHADOW E&M-EST. PATIENT-LVL III: ICD-10-PCS | Mod: PBBFAC,,, | Performed by: OPTOMETRIST

## 2019-09-24 PROCEDURE — 92004 PR EYE EXAM, NEW PATIENT,COMPREHESV: ICD-10-PCS | Mod: S$GLB,,, | Performed by: OPTOMETRIST

## 2019-09-24 NOTE — PROGRESS NOTES
HPI     Last eye exam was 7/26/16 with Dr Blanton.    Pt states she had an aneurysm and a stroke in august of 2016   Pt states she has not been seeing well since  Pt does not really wear her glasses anymore  Patient denies diplopia, headaches, flashes/floaters, pain, and   itching/burning/tearing.    Pt says she has itching and tearing sometimes.  Pt does not use any eye drops.        Last edited by Anita Kerr on 9/24/2019  9:09 AM. (History)            Assessment /Plan     For exam results, see Encounter Report.    Cortical cataract of both eyes   Borderline VS montior    Type 2 diabetes mellitus with chronic kidney disease on chronic dialysis, without long-term current use of insulin  ESRD (end stage renal disease) on dialysis   Good overall ocular health, monitor    Presbyopia   Rx specs increase add power    H/o stroke         RTC 1 year, sooner PRN

## 2019-10-01 RX ORDER — INSULIN PUMP SYRINGE, 3 ML
EACH MISCELLANEOUS
Qty: 1 EACH | Refills: 1 | Status: SHIPPED | OUTPATIENT
Start: 2019-10-01 | End: 2020-09-30

## 2019-10-01 RX ORDER — LANCETS
EACH MISCELLANEOUS
Qty: 400 EACH | Refills: 3 | Status: ON HOLD | OUTPATIENT
Start: 2019-10-01 | End: 2019-10-08 | Stop reason: HOSPADM

## 2019-10-01 NOTE — TELEPHONE ENCOUNTER
Spoke with Karo she states that Ms. Mccray is taking Renvela. She is confused on whether or not her mom is taking the Sodium Bicarbonate.

## 2019-10-01 NOTE — TELEPHONE ENCOUNTER
Isaac, please call Mrs Ayala or her daughter, Reina and ask if she is till taking Renvela.  She should be either taking the Renvela or Sodium Bicarbonate but not both.  Thanks

## 2019-10-02 ENCOUNTER — TELEPHONE (OUTPATIENT)
Dept: TRANSPLANT | Facility: CLINIC | Age: 59
End: 2019-10-02

## 2019-10-02 RX ORDER — ERGOCALCIFEROL 1.25 MG/1
CAPSULE ORAL
Qty: 12 CAPSULE | Refills: 2 | Status: ON HOLD | OUTPATIENT
Start: 2019-10-02 | End: 2019-10-08 | Stop reason: HOSPADM

## 2019-10-02 RX ORDER — SODIUM BICARBONATE 650 MG/1
TABLET ORAL
Qty: 360 TABLET | Refills: 2 | OUTPATIENT
Start: 2019-10-02

## 2019-10-02 NOTE — TELEPHONE ENCOUNTER
Isaac, please let Reina know that her Mom shouldn't be taking the Sodium Bicarb if she's taking the Renvela, so I refused the Sodium Bicarb and OK'd the Vitamin D.  Thanks

## 2019-10-02 NOTE — TELEPHONE ENCOUNTER
Spoke with patient, informed her that 2 potential donors had called but did not meet criteria. Educated her on HIPPA rules and the living donor process. Pt verbalized understanding.             ----- Message from Kellie Miller sent at 10/2/2019  3:53 PM CDT -----  Contact: Pt       ----- Message -----  From: Anusha Saunders  Sent: 10/2/2019  10:26 AM CDT  To: Vijaya Fonseca, #        ----- Message -----  From: Vijaya Fonseca  Sent: 10/2/2019  10:04 AM CDT  To: [facility] Pre-Kidney Transplant Non-Clinical    Pt called to speak with Merissa about if donor was contacted and when will the donor be matched   Callback#[phone] call in the after 4pm   Thank You  ERICH Fonseca Patient Access Rep

## 2019-10-03 ENCOUNTER — HOSPITAL ENCOUNTER (OUTPATIENT)
Dept: RADIOLOGY | Facility: HOSPITAL | Age: 59
Discharge: HOME | End: 2019-10-03
Attending: NURSE PRACTITIONER
Payer: MEDICARE

## 2019-10-03 ENCOUNTER — OFFICE VISIT (OUTPATIENT)
Dept: OBSTETRICS AND GYNECOLOGY | Facility: CLINIC | Age: 59
End: 2019-10-03
Payer: MEDICARE

## 2019-10-03 VITALS
HEIGHT: 62 IN | WEIGHT: 180 LBS | DIASTOLIC BLOOD PRESSURE: 80 MMHG | BODY MASS INDEX: 33.13 KG/M2 | SYSTOLIC BLOOD PRESSURE: 123 MMHG

## 2019-10-03 DIAGNOSIS — E89.40 SURGICAL MENOPAUSE: ICD-10-CM

## 2019-10-03 DIAGNOSIS — N89.8 VAGINAL DISCHARGE: ICD-10-CM

## 2019-10-03 DIAGNOSIS — N76.2 ACUTE VULVITIS: ICD-10-CM

## 2019-10-03 DIAGNOSIS — Z01.419 WELL WOMAN EXAM WITH ROUTINE GYNECOLOGICAL EXAM: Primary | ICD-10-CM

## 2019-10-03 DIAGNOSIS — Z76.82 ORGAN TRANSPLANT CANDIDATE: ICD-10-CM

## 2019-10-03 PROCEDURE — 87801 DETECT AGNT MULT DNA AMPLI: CPT | Mod: HCNC

## 2019-10-03 PROCEDURE — 77067 MAMMO DIGITAL SCREENING BILAT WITH CAD: ICD-10-PCS | Mod: 26,HCNC,TXP, | Performed by: RADIOLOGY

## 2019-10-03 PROCEDURE — 77067 SCR MAMMO BI INCL CAD: CPT | Mod: TC,HCNC,TXP

## 2019-10-03 PROCEDURE — 99999 PR PBB SHADOW E&M-EST. PATIENT-LVL IV: CPT | Mod: PBBFAC,HCNC,, | Performed by: NURSE PRACTITIONER

## 2019-10-03 PROCEDURE — G0101 PR CA SCREEN;PELVIC/BREAST EXAM: ICD-10-PCS | Mod: HCNC,S$GLB,, | Performed by: NURSE PRACTITIONER

## 2019-10-03 PROCEDURE — 87481 CANDIDA DNA AMP PROBE: CPT | Mod: 59,HCNC

## 2019-10-03 PROCEDURE — 77067 SCR MAMMO BI INCL CAD: CPT | Mod: 26,HCNC,TXP, | Performed by: RADIOLOGY

## 2019-10-03 PROCEDURE — 99999 PR PBB SHADOW E&M-EST. PATIENT-LVL IV: ICD-10-PCS | Mod: PBBFAC,HCNC,, | Performed by: NURSE PRACTITIONER

## 2019-10-03 PROCEDURE — G0101 CA SCREEN;PELVIC/BREAST EXAM: HCPCS | Mod: HCNC,S$GLB,, | Performed by: NURSE PRACTITIONER

## 2019-10-03 RX ORDER — FLUCONAZOLE 150 MG/1
TABLET ORAL
Qty: 2 TABLET | Refills: 4 | Status: ON HOLD | OUTPATIENT
Start: 2019-10-03 | End: 2019-10-08 | Stop reason: HOSPADM

## 2019-10-03 RX ORDER — TERCONAZOLE 8 MG/G
1 CREAM VAGINAL NIGHTLY
Qty: 20 G | Refills: 0 | Status: ON HOLD | OUTPATIENT
Start: 2019-10-03 | End: 2019-10-08 | Stop reason: HOSPADM

## 2019-10-03 NOTE — PROGRESS NOTES
HISTORY OF PRESENT ILLNESS:    Lucy Perez is a 59 y.o. female,   presents with her  and daughter today for her pre-kidney transplant routine visit and c/o vulvar itching and white vaginal discharge.  -Here to get established for care.   -States has been on two antibiotics for a UTI.    -Denies associated fever, pelvic or vaginal pain, odor or use of vulvovaginal irritants.    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    Cortical cataract of both eyes 2016    Decubitus ulcer of buttock, stage 2     Degeneration of lumbar or lumbosacral intervertebral disc 3/5/2013    S/p MRI L-spine 2009     Depression     Encounter for blood transfusion     ESRD on hemodialysis     Started 2018    General anesthetics causing adverse effect in therapeutic use     Hemorrhagic cerebrovascular accident (CVA)     [place] [facility] [place]    History of stroke 2017    s/p R-MCA stroke with R-putaminal hemorrhagic transformation in 2016 and 2016 ([place] [facility] with residual L hemiparesis, on AED s/p CVA      Hypertensive retinopathy of both eyes 2016    Impingement syndrome of right shoulder 2014    Obesity     THERESA (obstructive sleep apnea) 3/5/2013    No Home CPAP 2ndary to cost     Partial symptomatic epilepsy with complex partial seizures, not intractable, without status epilepticus     Rheumatoid arthritis(714.0)     Rotator cuff tear 2014    Sarcoidosis     Stroke 2016    left sided flaccidity, SAH    Vertebral artery stenosis 3/5/2013    S/p Stenting per Dr Burnett        Past  Surgical History:   Procedure Laterality Date    BREAST SURGERY      breast reduction    COLONOSCOPY N/A 8/11/2016    Procedure: COLONOSCOPY;  Surgeon: Jerry Vilchis MD;  [facility]  Service: Endoscopy;  Laterality: N/A;  Patient reports difficulty awaking from anesthesia in the past.    DECLOTTING OF VASCULAR GRAFT Right 6/20/2019    Procedure: DECLOT-GRAFT;  Surgeon: Cabrera Irwin MD;  Location: [facility] CATH LAB;  Service: Cardiology;  Laterality: Right;    FISTULOGRAM Right 2/11/2019    Procedure: Fistulogram;  Surgeon: Meir Valencia MD;  Location: [facility] CATH LAB;  Service: Peripheral Vascular;  Laterality: Right;    FISTULOGRAM Right 7/8/2019    Procedure: Fistulogram;  Surgeon: WELLINGTON Palm III, MD;  Location: [facility] CATH LAB;  Service: Peripheral Vascular;  Laterality: Right;    HYSTERECTOMY  1999    JOSE LUIS/BSO (AUB)    PLACEMENT OF ARTERIOVENOUS GRAFT Right 10/18/2018    Procedure: AV GRAFT CREATION;  Surgeon: Meir Valencia MD;  Location: [facility] OR 2ND FLR;  Service: Cardiovascular;  Laterality: Right;    ROTATOR CUFF REPAIR Right July 9, 2014    right side    Skull surgery      Aneurysm    stent placed      in vertebral artery    TOTAL REDUCTION MAMMOPLASTY      TUBAL LIGATION         MEDICATIONS AND ALLERGIES:      Current Outpatient Medications:     acetaminophen (TYLENOL) 325 MG tablet, Take 2 tablets (650 mg total) by mouth every 6 (six) hours as needed for Pain., Disp: , Rfl: 0    albuterol (PROVENTIL) 2.5 mg /3 mL (0.083 %) nebulizer solution, Take 3 mLs (2.5 mg total) by nebulization every 6 (six) hours as needed for Wheezing. Rescue, Disp: 25 each, Rfl: 3    albuterol (VENTOLIN HFA) 90 mcg/actuation inhaler, Inhale 2 puffs into the lungs every 6 (six) hours as needed for Wheezing. Rescue, Disp: 18 g, Rfl: 0    ALPRAZolam (XANAX) 0.5 MG tablet, 1 tab Every 8 hours as needed for anxiety, Disp: 30 tablet, Rfl: 0    amoxicillin-clavulanate 250-125mg (AUGMENTIN)  "250-125 mg Tab, Take 1 tablet by mouth every 12 (twelve) hours., Disp: 14 tablet, Rfl: 0    aspirin (ECOTRIN) 81 MG EC tablet, Take 81 mg by mouth every morning. , Disp: , Rfl:     atorvastatin (LIPITOR) 40 MG tablet, TAKE 1 TABLET ONE TIME DAILY FOR CHOLESTEROL, Disp: 90 tablet, Rfl: 2    BD INSULIN PEN NEEDLE UF SHORT 31 gauge x 5/16" Ndle, USE TO INJECT NOVOLOG FLEXPEN BEFORE MEALS, Disp: 150 each, Rfl: 11    bisacodyl (DULCOLAX) 10 mg Supp, Place 1 suppository (10 mg total) rectally daily as needed., Disp: 30 suppository, Rfl: 3    blood sugar diagnostic Strp, To check BG 4  times daily, to use with insurance preferred meter-true metrix, Disp: 400 each, Rfl: 3    blood-glucose meter kit, To check BG 4 times daily, to use with insurance preferred meter-true metrix, Disp: 1 each, Rfl: 1    buPROPion (WELLBUTRIN XL) 300 MG 24 hr tablet, Take 1 tablet (300 mg total) by mouth once daily., Disp: 30 tablet, Rfl: 6    ciprofloxacin HCl (CIPRO) 500 MG tablet, Take 1 tablet (500 mg total) by mouth every 12 (twelve) hours., Disp: 14 tablet, Rfl: 0    DULCOLAX, BISACODYL, ORAL, Take by mouth as needed (constipation)., Disp: , Rfl:     ergocalciferol (ERGOCALCIFEROL) 50,000 unit Cap, Take 1 capsule (50,000 Units total) by mouth every 7 days., Disp: , Rfl:     famotidine (PEPCID) 20 MG tablet, Take 1 tablet (20 mg total) by mouth once daily., Disp: 90 tablet, Rfl: 2    folic acid (FOLVITE) 1 MG tablet, Take 1 tablet (1 mg total) by mouth once daily., Disp: 90 tablet, Rfl: 2    furosemide (LASIX) 40 MG tablet, Take 1 tablet (40 mg total) by mouth 2 (two) times daily., Disp: 90 tablet, Rfl: 2    furosemide (LASIX) 40 MG tablet, TAKE 1 TABLET BY MOUTH ONCE DAILY, Disp: 90 tablet, Rfl: 2    glipiZIDE (GLUCOTROL) 2.5 MG TR24, Take 1 tablet (hold if less than 150) at lunch (non dialysis days), Disp: 30 tablet, Rfl: 4    insulin aspart U-100 (NOVOLOG U-100 INSULIN ASPART) 100 unit/mL injection, Use correction scale " 180-230+1, 231-280+2, 281-330+3, 331-380+4, >380+5, max 15 units., Disp: 3 vial, Rfl: 3    insulin glargine (LANTUS U-100 INSULIN) 100 unit/mL injection, Inject 10-12 Units into the skin every evening., Disp: 6 mL, Rfl: 6    lancets Misc, 1 lancet by Misc.(Non-Drug; Combo Route) route 2 (two) times daily with meals. Check glucose before meals and bed, Disp: 300 each, Rfl: 4    lancets Misc, To check BG 4 times daily, to use with insurance preferred meter-true metrix, Disp: 400 each, Rfl: 3    levETIRAcetam (KEPPRA) 500 MG Tab, TAKE 1 TABLET BY MOUTH EVERY 8 HOURS, Disp: 90 tablet, Rfl: 8    lidocaine-prilocaine (EMLA) cream, Apply topically as needed., Disp: 30 g, Rfl: 1    polyethylene glycol (MIRALAX) 17 gram/dose powder, Take 17 g by mouth 2 (two) times daily., Disp: 1 Bottle, Rfl: 6    pregabalin (LYRICA) 25 MG capsule, Take 1 capsule (25 mg total) by mouth once daily. May take additional dose after HD. (Patient taking differently: Take 25 mg by mouth daily as needed. May take additional dose after HD.), Disp: 90 capsule, Rfl: 4    sevelamer carbonate (RENVELA) 800 mg Tab, Take 800 mg by mouth 3 (three) times daily with meals., Disp: , Rfl:     traMADol (ULTRAM) 50 mg tablet, 1 tab Q6-8 hours as needed for joint pain, Disp: 30 tablet, Rfl: 0    VITAMIN D2 50,000 unit capsule, TAKE 1 CAPSULE PER G TUBE EVERY 7 DAYS, Disp: 12 capsule, Rfl: 2    fluconazole (DIFLUCAN) 150 MG Tab, Take one tablet by mouth once and may retreat in 3 days if still symptomatic, Disp: 2 tablet, Rfl: 4    terconazole (TERAZOL 3) 0.8 % vaginal cream, Place 1 applicator vaginally every evening. for 3 days, Disp: 20 g, Rfl: 0    Review of patient's allergies indicates:   Allergen Reactions    Lisinopril Other (See Comments)     Angioedema      Vicodin [hydrocodone-acetaminophen] Rash     No problem with acetaminophen        Family History   Problem Relation Age of Onset    Diabetes Mother     Hypertension Mother     Heart  "disease Mother     Heart attack Mother     Breast cancer Mother     Stroke Sister     Hypertension Sister     Sleep apnea Sister     No Known Problems Daughter     Diabetes Son     Bell's palsy Sister     Lupus Sister     Blindness Maternal Grandmother     Diabetes Unknown         "My entire family family has diabetes"    Stroke Maternal Aunt     Colon cancer Neg Hx     Ovarian cancer Neg Hx        Social History     Socioeconomic History    Marital status:      Spouse name: Not on file    Number of children: Not on file    Years of education: Not on file    Highest education level: Not on file   Occupational History    Not on file   Social Needs    Financial resource strain: Not on file    Food insecurity:     Worry: Not on file     Inability: Not on file    Transportation needs:     Medical: Not on file     Non-medical: Not on file   Tobacco Use    Smoking status: Never Smoker    Smokeless tobacco: Never Used   Substance and Sexual Activity    Alcohol use: Yes     Comment: occasional wine cooler     Drug use: Never    Sexual activity: Yes     Partners: Male     Birth control/protection: Post-menopausal   Lifestyle    Physical activity:     Days per week: Not on file     Minutes per session: Not on file    Stress: Not on file   Relationships    Social connections:     Talks on phone: Not on file     Gets together: Not on file     Attends Faith service: Not on file     Active member of club or organization: Not on file     Attends meetings of clubs or organizations: Not on file     Relationship status: Not on file   Other Topics Concern    Not on file   Social History Narrative    . 2 children(Wilder and Reina). Does not work. Previously worked as a .        OB HISTORY: Number of vaginal deliveries:2    COMPREHENSIVE GYN HISTORY:  PAP History: Denies abnormal Paps.  Infection History: Denies STDs. Denies PID.  Benign History: Denies uterine " "fibroids. Denies ovarian cysts. Denies endometriosis. Denies other conditions.  Cancer History: Denies cervical cancer. Denies uterine cancer or hyperplasia. Denies ovarian cancer. Denies vulvar cancer or pre-cancer. Denies vaginal cancer or pre-cancer. Denies breast cancer. Denies colon cancer.  Sexual Activity History: Reports currently being sexually active  Menstrual History: Denies menses. Pt is  (hyst 1999) not on ERT.     ROS:  GENERAL: No weight changes. No swelling. No fatigue. No fever.  CARDIOVASCULAR: No chest pain. No shortness of breath. No leg cramps.   NEUROLOGICAL: No headaches. No vision changes.  BREASTS: No pain. No lumps. No discharge.  ABDOMEN: No pain. No nausea. No vomiting. No diarrhea. No constipation.  REPRODUCTIVE: No abnormal bleeding.  VULVA: No pain. No lesions. No itching.  VAGINA: No relaxation. No itching. No odor. No discharge. No lesions.  URINARY: No incontinence. No nocturia. No frequency. No dysuria.    /80   Ht 5' 2" (1.575 m)   Wt 81.6 kg (180 lb)   LMP  (LMP Unknown)   BMI 32.92 kg/m²     PE:  APPEARANCE: Well nourished, well developed, in no acute distress.  AFFECT: WNL, alert and oriented x 3.  SKIN: No acne or hirsutism.  NECK: Neck symmetric without masses or thyromegaly.  NODES: No inguinal, cervical, axillary or femoral lymph node enlargement.  CHEST: Good respiratory effort.   ABDOMEN: Soft. No tenderness or masses.   BREASTS: Symmetrical, no skin changes or visible lesions. No palpable masses, nipple discharge bilaterally.  PELVIC: ERYTHEMATOUS, EDEMATOUS ATROPHIC EXTERNAL FEMALE GENITALIA without lesions. There is a SCALY RASH PRESENT BILATERAL GROIN. Normal hair distribution. Adequate perineal body, normal urethral meatus. VAGINA with WHITE THIN D/C, ATROPHIC without lesions or discharge. No significant cystocele or rectocele. Bimanual exam shows CERVIX and UTERUS to be SURGICALLY ABSENT. Adnexa without masses or tenderness.  EXTREMITIES: No " edema.    DIAGNOSIS:  1. Well woman exam with routine gynecological exam    2. Surgical menopause    3. Acute vulvitis    4. Vaginal discharge        Orders Placed This Encounter    Vaginosis Screen by DNA Probe    terconazole (TERAZOL 3) 0.8 % vaginal cream    fluconazole (DIFLUCAN) 150 MG Tab     Up to date on mammogram and colonoscopy    COUNSELING:  The patient was counseled today on:  -vaginitis prevention;  -Terazol and Diflucan use and potential side effects;  -osteoporosis prevention and regular weight bearing exercise;  -ACS PAP guidelines (no paps), with recommendations for pelvic exams every 3-5 years as her uterus, ovaries and cervix were removed for benign reasons;  -recommendation for yearly mammogram;  -to see her primary care physician for all other health maintenance.    FOLLOW-UP with Dr Gudino for next routine visit.

## 2019-10-03 NOTE — LETTER
October 3, 2019      Marilynn Rod NP  [address]  [facility]  1st Floor Clinic  [facility] [address]           [facility] - OB/GYN 5th Floor  [address]  [place] [address]  Phone: [phone]          Patient: Lucy Perez   MR Number: [id number]   YOB: 1960   Date of Visit: 10/3/2019       Dear Marilynn Rod:    Thank you for referring Lucy Perez to me for evaluation. Attached you will find relevant portions of my assessment and plan of care.    If you have questions, please do not hesitate to call me. I look forward to following Lucy Perez along with you.    Sincerely,    AMRITA Gasca NP    Enclosure  CC:  No Recipients    If you would like to receive this communication electronically, please contact [email or web address] or [phone] to request more information on [employer] access.    For providers and/or their staff who would like to refer a patient to [facility], please contact us through our one-stop-shop provider referral line, [facility], at [phone].    If you feel you have received this communication in error or would no longer like to receive these types of communications, please e-mail [email or web address]

## 2019-10-04 LAB
BACTERIAL VAGINOSIS DNA: NEGATIVE
CANDIDA GLABRATA DNA: NEGATIVE
CANDIDA KRUSEI DNA: NEGATIVE
CANDIDA RRNA VAG QL PROBE: POSITIVE
T VAGINALIS RRNA GENITAL QL PROBE: NEGATIVE

## 2019-10-07 ENCOUNTER — HOSPITAL ENCOUNTER (INPATIENT)
Facility: HOSPITAL | Age: 59
LOS: 1 days | Discharge: HOME | DRG: 100 | End: 2019-10-08
Attending: EMERGENCY MEDICINE | Admitting: HOSPITALIST
Payer: MEDICARE

## 2019-10-07 ENCOUNTER — TELEPHONE (OUTPATIENT)
Dept: INTERNAL MEDICINE | Facility: CLINIC | Age: 59
End: 2019-10-07

## 2019-10-07 DIAGNOSIS — E87.29 HIGH ANION GAP METABOLIC ACIDOSIS: ICD-10-CM

## 2019-10-07 DIAGNOSIS — I69.359 HEMIPLEGIA AS LATE EFFECT OF STROKE: Primary | ICD-10-CM

## 2019-10-07 DIAGNOSIS — E87.5 HYPERKALEMIA: ICD-10-CM

## 2019-10-07 DIAGNOSIS — N18.6 ANEMIA IN ESRD (END-STAGE RENAL DISEASE): ICD-10-CM

## 2019-10-07 DIAGNOSIS — R56.9 SEIZURE: ICD-10-CM

## 2019-10-07 DIAGNOSIS — Z99.2 ESRD (END STAGE RENAL DISEASE) ON DIALYSIS: ICD-10-CM

## 2019-10-07 DIAGNOSIS — I69.359 HEMIPLEGIA AS LATE EFFECT OF STROKE: ICD-10-CM

## 2019-10-07 DIAGNOSIS — D63.1 ANEMIA IN ESRD (END-STAGE RENAL DISEASE): ICD-10-CM

## 2019-10-07 DIAGNOSIS — N18.6 ESRD (END STAGE RENAL DISEASE) ON DIALYSIS: ICD-10-CM

## 2019-10-07 DIAGNOSIS — G40.909: ICD-10-CM

## 2019-10-07 PROBLEM — Z79.4 TYPE 2 DIABETES MELLITUS WITH CHRONIC KIDNEY DISEASE ON CHRONIC DIALYSIS, WITH LONG-TERM CURRENT USE OF INSULIN: Status: ACTIVE | Noted: 2018-08-10

## 2019-10-07 LAB
ALBUMIN SERPL BCP-MCNC: 3.4 G/DL (ref 3.5–5.2)
ALBUMIN SERPL BCP-MCNC: 3.6 G/DL (ref 3.5–5.2)
ALP SERPL-CCNC: 93 U/L (ref 55–135)
ALT SERPL W/O P-5'-P-CCNC: 9 U/L (ref 10–44)
ANION GAP SERPL CALC-SCNC: 11 MMOL/L (ref 8–16)
ANION GAP SERPL CALC-SCNC: 20 MMOL/L (ref 8–16)
AST SERPL-CCNC: 17 U/L (ref 10–40)
BACTERIA #/AREA URNS AUTO: ABNORMAL /HPF
BASOPHILS # BLD AUTO: 0.04 K/UL (ref 0–0.2)
BASOPHILS NFR BLD: 0.7 % (ref 0–1.9)
BILIRUB SERPL-MCNC: 0.4 MG/DL (ref 0.1–1)
BILIRUB UR QL STRIP: NEGATIVE
BUN SERPL-MCNC: 106 MG/DL (ref 8–23)
BUN SERPL-MCNC: 113 MG/DL (ref 6–30)
BUN SERPL-MCNC: 43 MG/DL (ref 8–23)
CALCIUM SERPL-MCNC: 9 MG/DL (ref 8.7–10.5)
CALCIUM SERPL-MCNC: 9.1 MG/DL (ref 8.7–10.5)
CHLORIDE SERPL-SCNC: 103 MMOL/L (ref 95–110)
CHLORIDE SERPL-SCNC: 98 MMOL/L (ref 95–110)
CHLORIDE SERPL-SCNC: 99 MMOL/L (ref 95–110)
CLARITY UR REFRACT.AUTO: CLEAR
CO2 SERPL-SCNC: 19 MMOL/L (ref 23–29)
CO2 SERPL-SCNC: 29 MMOL/L (ref 23–29)
COLOR UR AUTO: YELLOW
CREAT SERPL-MCNC: 10.4 MG/DL (ref 0.5–1.4)
CREAT SERPL-MCNC: 5.3 MG/DL (ref 0.5–1.4)
CREAT SERPL-MCNC: 9.4 MG/DL (ref 0.5–1.4)
DIFFERENTIAL METHOD: ABNORMAL
EOSINOPHIL # BLD AUTO: 0.1 K/UL (ref 0–0.5)
EOSINOPHIL NFR BLD: 1.9 % (ref 0–8)
ERYTHROCYTE [DISTWIDTH] IN BLOOD BY AUTOMATED COUNT: 13.4 % (ref 11.5–14.5)
EST. GFR  (AFRICAN AMERICAN): 4.7 ML/MIN/1.73 M^2
EST. GFR  (AFRICAN AMERICAN): 9.4 ML/MIN/1.73 M^2
EST. GFR  (NON AFRICAN AMERICAN): 4.1 ML/MIN/1.73 M^2
EST. GFR  (NON AFRICAN AMERICAN): 8.1 ML/MIN/1.73 M^2
GLUCOSE SERPL-MCNC: 123 MG/DL (ref 70–110)
GLUCOSE SERPL-MCNC: 172 MG/DL (ref 70–110)
GLUCOSE SERPL-MCNC: 215 MG/DL (ref 70–110)
GLUCOSE UR QL STRIP: ABNORMAL
HCT VFR BLD AUTO: 38.2 % (ref 37–48.5)
HCT VFR BLD CALC: 36 %PCV (ref 36–54)
HGB BLD-MCNC: 12 G/DL (ref 12–16)
HGB UR QL STRIP: ABNORMAL
HYALINE CASTS UR QL AUTO: 1 /LPF
IMM GRANULOCYTES # BLD AUTO: 0.02 K/UL (ref 0–0.04)
IMM GRANULOCYTES NFR BLD AUTO: 0.3 % (ref 0–0.5)
KETONES UR QL STRIP: NEGATIVE
LEUKOCYTE ESTERASE UR QL STRIP: ABNORMAL
LIPASE SERPL-CCNC: 81 U/L (ref 4–60)
LYMPHOCYTES # BLD AUTO: 0.9 K/UL (ref 1–4.8)
LYMPHOCYTES NFR BLD: 15.5 % (ref 18–48)
MCH RBC QN AUTO: 32.5 PG (ref 27–31)
MCHC RBC AUTO-ENTMCNC: 31.4 G/DL (ref 32–36)
MCV RBC AUTO: 104 FL (ref 82–98)
MICROSCOPIC COMMENT: ABNORMAL
MONOCYTES # BLD AUTO: 0.4 K/UL (ref 0.3–1)
MONOCYTES NFR BLD: 6.3 % (ref 4–15)
NEUTROPHILS # BLD AUTO: 4.4 K/UL (ref 1.8–7.7)
NEUTROPHILS NFR BLD: 75.3 % (ref 38–73)
NITRITE UR QL STRIP: NEGATIVE
NRBC BLD-RTO: 0 /100 WBC
PH UR STRIP: 8 [PH] (ref 5–8)
PHOSPHATE SERPL-MCNC: 4 MG/DL (ref 2.7–4.5)
PLATELET # BLD AUTO: 181 K/UL (ref 150–350)
PMV BLD AUTO: 10.6 FL (ref 9.2–12.9)
POC IONIZED CALCIUM: 0.86 MMOL/L (ref 1.06–1.42)
POC TCO2 (MEASURED): 23 MMOL/L (ref 23–29)
POCT GLUCOSE: 107 MG/DL (ref 70–110)
POCT GLUCOSE: 140 MG/DL (ref 70–110)
POCT GLUCOSE: 168 MG/DL (ref 70–110)
POCT GLUCOSE: 242 MG/DL (ref 70–110)
POTASSIUM BLD-SCNC: 6.8 MMOL/L (ref 3.5–5.1)
POTASSIUM SERPL-SCNC: 4.4 MMOL/L (ref 3.5–5.1)
POTASSIUM SERPL-SCNC: 7.3 MMOL/L (ref 3.5–5.1)
PROT SERPL-MCNC: 7.3 G/DL (ref 6–8.4)
PROT UR QL STRIP: ABNORMAL
RBC # BLD AUTO: 3.69 M/UL (ref 4–5.4)
RBC #/AREA URNS AUTO: 3 /HPF (ref 0–4)
SAMPLE: ABNORMAL
SODIUM BLD-SCNC: 135 MMOL/L (ref 136–145)
SODIUM SERPL-SCNC: 138 MMOL/L (ref 136–145)
SODIUM SERPL-SCNC: 138 MMOL/L (ref 136–145)
SP GR UR STRIP: 1.01 (ref 1–1.03)
SQUAMOUS #/AREA URNS AUTO: 3 /HPF
TROPONIN I SERPL DL<=0.01 NG/ML-MCNC: 0.02 NG/ML (ref 0–0.03)
TSH SERPL DL<=0.005 MIU/L-ACNC: 0.52 UIU/ML (ref 0.4–4)
URN SPEC COLLECT METH UR: ABNORMAL
WBC # BLD AUTO: 5.86 K/UL (ref 3.9–12.7)
WBC #/AREA URNS AUTO: 8 /HPF (ref 0–5)
YEAST UR QL AUTO: ABNORMAL

## 2019-10-07 PROCEDURE — 93005 ELECTROCARDIOGRAM TRACING: CPT | Mod: HCNC,NTX

## 2019-10-07 PROCEDURE — 99223 1ST HOSP IP/OBS HIGH 75: CPT | Mod: AI,HCNC,GC,NTX | Performed by: HOSPITALIST

## 2019-10-07 PROCEDURE — 84443 ASSAY THYROID STIM HORMONE: CPT | Mod: HCNC,NTX

## 2019-10-07 PROCEDURE — 80047 BASIC METABLC PNL IONIZED CA: CPT | Mod: HCNC,NTX

## 2019-10-07 PROCEDURE — 36415 COLL VENOUS BLD VENIPUNCTURE: CPT | Mod: HCNC,NTX

## 2019-10-07 PROCEDURE — 80053 COMPREHEN METABOLIC PANEL: CPT | Mod: HCNC,NTX

## 2019-10-07 PROCEDURE — 63600175 PHARM REV CODE 636 W HCPCS: Mod: HCNC,NTX

## 2019-10-07 PROCEDURE — 90935 HEMODIALYSIS ONE EVALUATION: CPT | Mod: HCNC,NTX,, | Performed by: INTERNAL MEDICINE

## 2019-10-07 PROCEDURE — 93010 ELECTROCARDIOGRAM REPORT: CPT | Mod: HCNC,NTX,, | Performed by: INTERNAL MEDICINE

## 2019-10-07 PROCEDURE — 96365 THER/PROPH/DIAG IV INF INIT: CPT | Mod: HCNC,NTX

## 2019-10-07 PROCEDURE — 80100014 HC HEMODIALYSIS 1:1: Mod: HCNC,NTX

## 2019-10-07 PROCEDURE — G0257 UNSCHED DIALYSIS ESRD PT HOS: HCPCS | Mod: HCNC,NTX

## 2019-10-07 PROCEDURE — 99223 PR INITIAL HOSPITAL CARE,LEVL III: ICD-10-PCS | Mod: AI,HCNC,GC,NTX | Performed by: HOSPITALIST

## 2019-10-07 PROCEDURE — C9399 UNCLASSIFIED DRUGS OR BIOLOG: HCPCS | Mod: HCNC,NTX | Performed by: STUDENT IN AN ORGANIZED HEALTH CARE EDUCATION/TRAINING PROGRAM

## 2019-10-07 PROCEDURE — 63600175 PHARM REV CODE 636 W HCPCS: Mod: HCNC,NTX | Performed by: EMERGENCY MEDICINE

## 2019-10-07 PROCEDURE — 81001 URINALYSIS AUTO W/SCOPE: CPT | Mod: HCNC,NTX

## 2019-10-07 PROCEDURE — 80177 DRUG SCRN QUAN LEVETIRACETAM: CPT | Mod: HCNC,NTX

## 2019-10-07 PROCEDURE — 99291 PR CRITICAL CARE, E/M 30-74 MINUTES: ICD-10-PCS | Mod: HCNC,NTX,, | Performed by: EMERGENCY MEDICINE

## 2019-10-07 PROCEDURE — 82962 GLUCOSE BLOOD TEST: CPT | Mod: HCNC,NTX

## 2019-10-07 PROCEDURE — 63600175 PHARM REV CODE 636 W HCPCS: Mod: HCNC,NTX | Performed by: STUDENT IN AN ORGANIZED HEALTH CARE EDUCATION/TRAINING PROGRAM

## 2019-10-07 PROCEDURE — 83690 ASSAY OF LIPASE: CPT | Mod: HCNC,NTX

## 2019-10-07 PROCEDURE — 90935 PR HEMODIALYSIS, ONE EVALUATION: ICD-10-PCS | Mod: HCNC,NTX,, | Performed by: INTERNAL MEDICINE

## 2019-10-07 PROCEDURE — 85025 COMPLETE CBC W/AUTO DIFF WBC: CPT | Mod: HCNC,NTX

## 2019-10-07 PROCEDURE — 99223 PR INITIAL HOSPITAL CARE,LEVL III: ICD-10-PCS | Mod: HCNC,25,NTX, | Performed by: INTERNAL MEDICINE

## 2019-10-07 PROCEDURE — 20600001 HC STEP DOWN PRIVATE ROOM: Mod: HCNC,NTX

## 2019-10-07 PROCEDURE — 99223 1ST HOSP IP/OBS HIGH 75: CPT | Mod: HCNC,25,NTX, | Performed by: INTERNAL MEDICINE

## 2019-10-07 PROCEDURE — 93010 EKG 12-LEAD: ICD-10-PCS | Mod: HCNC,NTX,, | Performed by: INTERNAL MEDICINE

## 2019-10-07 PROCEDURE — 99284 PR EMERGENCY DEPT VISIT,LEVEL IV: ICD-10-PCS | Mod: HCNC,NTX,, | Performed by: PSYCHIATRY & NEUROLOGY

## 2019-10-07 PROCEDURE — 99284 EMERGENCY DEPT VISIT MOD MDM: CPT | Mod: HCNC,NTX,, | Performed by: PSYCHIATRY & NEUROLOGY

## 2019-10-07 PROCEDURE — 84484 ASSAY OF TROPONIN QUANT: CPT | Mod: HCNC,NTX

## 2019-10-07 PROCEDURE — 99291 CRITICAL CARE FIRST HOUR: CPT | Mod: 25,HCNC,NTX

## 2019-10-07 PROCEDURE — 80069 RENAL FUNCTION PANEL: CPT | Mod: HCNC,NTX

## 2019-10-07 PROCEDURE — 99291 CRITICAL CARE FIRST HOUR: CPT | Mod: HCNC,NTX,, | Performed by: EMERGENCY MEDICINE

## 2019-10-07 RX ORDER — ALBUTEROL SULFATE 90 UG/1
2 AEROSOL, METERED RESPIRATORY (INHALATION) EVERY 6 HOURS PRN
Status: DISCONTINUED | OUTPATIENT
Start: 2019-10-07 | End: 2019-10-07

## 2019-10-07 RX ORDER — ATORVASTATIN CALCIUM 20 MG/1
40 TABLET, FILM COATED ORAL DAILY
Status: DISCONTINUED | OUTPATIENT
Start: 2019-10-08 | End: 2019-10-08 | Stop reason: HOSPADM

## 2019-10-07 RX ORDER — FAMOTIDINE 20 MG/1
20 TABLET, FILM COATED ORAL DAILY
Status: DISCONTINUED | OUTPATIENT
Start: 2019-10-08 | End: 2019-10-08 | Stop reason: HOSPADM

## 2019-10-07 RX ORDER — INSULIN ASPART 100 [IU]/ML
0-5 INJECTION, SOLUTION INTRAVENOUS; SUBCUTANEOUS EVERY 6 HOURS PRN
Status: DISCONTINUED | OUTPATIENT
Start: 2019-10-07 | End: 2019-10-08 | Stop reason: HOSPADM

## 2019-10-07 RX ORDER — LORAZEPAM 2 MG/ML
INJECTION INTRAMUSCULAR
Status: COMPLETED
Start: 2019-10-07 | End: 2019-10-07

## 2019-10-07 RX ORDER — ALBUTEROL SULFATE 2.5 MG/.5ML
2.5 SOLUTION RESPIRATORY (INHALATION) EVERY 4 HOURS PRN
Status: DISCONTINUED | OUTPATIENT
Start: 2019-10-07 | End: 2019-10-08 | Stop reason: HOSPADM

## 2019-10-07 RX ORDER — ASPIRIN 81 MG/1
81 TABLET ORAL EVERY MORNING
Status: DISCONTINUED | OUTPATIENT
Start: 2019-10-08 | End: 2019-10-08 | Stop reason: HOSPADM

## 2019-10-07 RX ORDER — SODIUM CHLORIDE 0.9 % (FLUSH) 0.9 %
10 SYRINGE (ML) INJECTION
Status: DISCONTINUED | OUTPATIENT
Start: 2019-10-07 | End: 2019-10-08 | Stop reason: HOSPADM

## 2019-10-07 RX ORDER — HEPARIN SODIUM 5000 [USP'U]/ML
5000 INJECTION, SOLUTION INTRAVENOUS; SUBCUTANEOUS EVERY 8 HOURS
Status: DISCONTINUED | OUTPATIENT
Start: 2019-10-07 | End: 2019-10-08 | Stop reason: HOSPADM

## 2019-10-07 RX ORDER — POLYETHYLENE GLYCOL 3350 17 G/17G
17 POWDER, FOR SOLUTION ORAL ONCE
Status: DISCONTINUED | OUTPATIENT
Start: 2019-10-07 | End: 2019-10-08 | Stop reason: HOSPADM

## 2019-10-07 RX ORDER — ALBUTEROL SULFATE 2.5 MG/.5ML
10 SOLUTION RESPIRATORY (INHALATION)
Status: DISCONTINUED | OUTPATIENT
Start: 2019-10-07 | End: 2019-10-07

## 2019-10-07 RX ORDER — SEVELAMER CARBONATE 800 MG/1
800 TABLET, FILM COATED ORAL
Status: DISCONTINUED | OUTPATIENT
Start: 2019-10-07 | End: 2019-10-08 | Stop reason: HOSPADM

## 2019-10-07 RX ORDER — LEVETIRACETAM 15 MG/ML
1500 INJECTION INTRAVASCULAR
Status: COMPLETED | OUTPATIENT
Start: 2019-10-07 | End: 2019-10-07

## 2019-10-07 RX ORDER — ALBUTEROL SULFATE 2.5 MG/.5ML
2.5 SOLUTION RESPIRATORY (INHALATION) EVERY 4 HOURS
Status: DISCONTINUED | OUTPATIENT
Start: 2019-10-07 | End: 2019-10-07

## 2019-10-07 RX ORDER — FOLIC ACID 1 MG/1
1 TABLET ORAL DAILY
Status: DISCONTINUED | OUTPATIENT
Start: 2019-10-08 | End: 2019-10-08 | Stop reason: HOSPADM

## 2019-10-07 RX ORDER — GLUCAGON 1 MG
1 KIT INJECTION
Status: DISCONTINUED | OUTPATIENT
Start: 2019-10-07 | End: 2019-10-08 | Stop reason: HOSPADM

## 2019-10-07 RX ORDER — LEVETIRACETAM 500 MG/1
500 TABLET ORAL EVERY 12 HOURS
Status: DISCONTINUED | OUTPATIENT
Start: 2019-10-07 | End: 2019-10-08

## 2019-10-07 RX ADMIN — HEPARIN SODIUM 5000 UNITS: 5000 INJECTION, SOLUTION INTRAVENOUS; SUBCUTANEOUS at 10:10

## 2019-10-07 RX ADMIN — LORAZEPAM 4 MG: 2 INJECTION INTRAMUSCULAR; INTRAVENOUS at 01:10

## 2019-10-07 RX ADMIN — INSULIN DETEMIR 6 UNITS: 100 INJECTION, SOLUTION SUBCUTANEOUS at 10:10

## 2019-10-07 RX ADMIN — LEVETIRACETAM INJECTION 1500 MG: 15 INJECTION INTRAVENOUS at 01:10

## 2019-10-07 NOTE — ASSESSMENT & PLAN NOTE
58 yo F with a PMH of  ESRD secondary to HTN and Diabetes nephropathy on HD M,W,F Since August 2018 via RUE AVG, CVA s/p aneurysm rupture with residual hemiparesis, seizure disorder on Keppra, HTN, presents with 2 episodes of seizure from Home, found to have a potassium of 7.3 with EKG changes    Plan:   - start RRT emergently in the ER with a three hour session and a 2K bath  - no shifting, calcium gluconate given in the ER  - repeat chemistry  - low potassium diet  - cardiac  Monitor to document arrhythmias  - intake and output  - daily weights  - will dialyze as needed while Inpatient

## 2019-10-07 NOTE — CONSULTS
[facility]  Nephrology  Consult Note    Patient Name: Lucy Perez  MRN: [id number]  Admission Date: 10/7/2019  Hospital Length of Stay: 0 days  Attending Provider: Yady Richardson MD   Primary Care Physician: Beverly Muniz MD  Principal Problem:Epilepsy without status epilepticus    Inpatient consult to Nephrology  Consult performed by: Mitchel Hernandez MD  Consult ordered by: Anayeli Goins PA-C  Reason for consult: ESRD co management         Subjective:     HPI: 58 yo F with a PMH of  ESRD secondary to HTN and Diabetes nephropathy on HD M,W,F Since August 2018 via RUE AVG, 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  and daughter provided history since patient is sedated after a dose of Ativan. Patient had a pounding headache last night and woke up with one this morning.  At approximately 8 am, patient had seizure in which she looked to the left, was unresponsive, and convulsive for approximately 2 minutes, afterwards she was in a post-ictal state of confusion for approximately 5 minutes.  Family chose not to report to hospital after that incident as they were on her way to her hemodialysis appointment.  Family reports another seizure-incident like the first one at 9:40 am, at which point they drove to the emergency room.  In the emergency room, patient has another seizure at 1 pm, witnessed by ER doctor after which she was administered Ativan, Potassium was found to be 7.3 with peaked T waves on EKG.     Nephrology History  iHD Schedule: M,W,F  Unit/MD: Dr Danny Lemons  Duration: 4 hours  UF: 3 L  EDW: 83.4  Access: RUE AVG   Residual Renal Function: minimal (less  Than a cup a day)      Past Medical History:   Diagnosis Date    Anemia of chronic disease 6/10/2017    Anxiety     Arthritis of right acromioclavicular joint 7/2/2014    Asthma     Bipolar disorder      Bronchitis, acute     Cataract     Cholelithiasis     Chronic diastolic CHF (congestive heart failure)     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    Cortical cataract of both eyes 7/26/2016    Decubitus ulcer of buttock, stage 2     Degeneration of lumbar or lumbosacral intervertebral disc 3/5/2013    S/p MRI L-spine 5/2009     Depression     Encounter for blood transfusion     ESRD on hemodialysis     Started August 2018    General anesthetics causing adverse effect in therapeutic use     Hemorrhagic cerebrovascular accident (CVA)     8[place] [facility] [place]    History of stroke 6/28/2017    s/p R-MCA stroke with R-putaminal hemorrhagic transformation in 8/2016 and 11/2016 ([place] [facility] [place] with residual L hemiparesis, on AED s/p CVA      Hypertensive retinopathy of both eyes 7/26/2016    Impingement syndrome of right shoulder 7/2/2014    Obesity     THERESA (obstructive sleep apnea) 3/5/2013    No Home CPAP 2ndary to cost     Partial symptomatic epilepsy with complex partial seizures, not intractable, without status epilepticus     Rheumatoid arthritis(714.0)     Rotator cuff tear 7/2/2014    Sarcoidosis     Stroke 2016    left sided flaccidity, SAH    Vertebral artery stenosis 3/5/2013    S/p Stenting per Dr Burnett        Past Surgical History:   Procedure Laterality Date    BREAST SURGERY      breast reduction    COLONOSCOPY N/A 8/11/2016    Procedure: COLONOSCOPY;  Surgeon: Jerry Vilchis MD;  Location: [facility] ENDO ([address] [place] [address]);  Service: Endoscopy;  Laterality: N/A;  Patient reports difficulty awaking from anesthesia in the past.    DECLOTTING OF VASCULAR GRAFT Right 6/20/2019    Procedure: DECLOT-GRAFT;  Surgeon: Cabrera Irwin MD;  Location: [facility] CATH LAB;  Service: Cardiology;  Laterality: Right;    FISTULOGRAM Right 2/11/2019    Procedure: Fistulogram;  Surgeon: Meir Valencia MD;  Location: [facility] CATH  "LAB;  Service: Peripheral Vascular;  Laterality: Right;    FISTULOGRAM Right 7/8/2019    Procedure: Fistulogram;  Surgeon: WELLINGTON Palm III, MD;  Location: [facility] CATH LAB;  Service: Peripheral Vascular;  Laterality: Right;    HYSTERECTOMY  1999    JOSE LUIS/BSO (AUB)    PLACEMENT OF ARTERIOVENOUS GRAFT Right 10/18/2018    Procedure: AV GRAFT CREATION;  Surgeon: Meir Valencia MD;  Location: [facility] OR [facility] FLR;  Service: Cardiovascular;  Laterality: Right;    ROTATOR CUFF REPAIR Right July 9, 2014    right side    Skull surgery      Aneurysm    stent placed      in vertebral artery    TOTAL REDUCTION MAMMOPLASTY      TUBAL LIGATION         Review of patient's allergies indicates:   Allergen Reactions    Lisinopril Other (See Comments)     Angioedema      Vicodin [hydrocodone-acetaminophen] Rash     No problem with acetaminophen      Current Facility-Administered Medications   Medication Frequency    calcium gluconate 1g in dextrose 5% 100mL (ready to mix system) ED 1 Time     Current Outpatient Medications   Medication    acetaminophen (TYLENOL) 325 MG tablet    albuterol (PROVENTIL) 2.5 mg /3 mL (0.083 %) nebulizer solution    albuterol (VENTOLIN HFA) 90 mcg/actuation inhaler    ALPRAZolam (XANAX) 0.5 MG tablet    amoxicillin-clavulanate 250-125mg (AUGMENTIN) 250-125 mg Tab    aspirin (ECOTRIN) 81 MG EC tablet    atorvastatin (LIPITOR) 40 MG tablet    BD INSULIN PEN NEEDLE UF SHORT 31 gauge x 5/16" Ndle    bisacodyl (DULCOLAX) 10 mg Supp    blood sugar diagnostic Strp    blood-glucose meter kit    buPROPion (WELLBUTRIN XL) 300 MG 24 hr tablet    ciprofloxacin HCl (CIPRO) 500 MG tablet    DULCOLAX, BISACODYL, ORAL    ergocalciferol (ERGOCALCIFEROL) 50,000 unit Cap    famotidine (PEPCID) 20 MG tablet    fluconazole (DIFLUCAN) 150 MG Tab    folic acid (FOLVITE) 1 MG tablet    furosemide (LASIX) 40 MG tablet    furosemide (LASIX) 40 MG tablet    glipiZIDE (GLUCOTROL) 2.5 MG TR24    insulin " aspart U-100 (NOVOLOG U-100 INSULIN ASPART) 100 unit/mL injection    insulin glargine (LANTUS U-100 INSULIN) 100 unit/mL injection    lancets Misc    lancets Misc    levETIRAcetam (KEPPRA) 500 MG Tab    lidocaine-prilocaine (EMLA) cream    polyethylene glycol (MIRALAX) 17 gram/dose powder    pregabalin (LYRICA) 25 MG capsule    sevelamer carbonate (RENVELA) 800 mg Tab    traMADol (ULTRAM) 50 mg tablet    VITAMIN D2 50,000 unit capsule     Family History     Problem Relation (Age of Onset)    Bell's palsy Sister    Blindness Maternal Grandmother    Breast cancer Mother    Diabetes Mother, Son,     Heart attack Mother    Heart disease Mother    Hypertension Mother, Sister    Lupus Sister    No Known Problems Daughter    Sleep apnea Sister    Stroke Sister, Maternal Aunt        Tobacco Use    Smoking status: Never Smoker    Smokeless tobacco: Never Used   Substance and Sexual Activity    Alcohol use: Yes     Comment: occasional wine cooler     Drug use: Never    Sexual activity: Yes     Partners: Male     Birth control/protection: Post-menopausal     Review of Systems   Unable to perform ROS: Mental status change   Neurological: Positive for seizures and weakness.     Objective:     Vital Signs (Most Recent):  Temp: 98.4 °F (36.9 °C) (10/07/19 1125)  Pulse: 85 (10/07/19 1524)  Resp: 20 (10/07/19 1524)  BP: (!) 142/74 (10/07/19 1516)  SpO2: 100 % (10/07/19 1524) Vital Signs (24h Range):  Temp:  [98.4 °F (36.9 °C)] 98.4 °F (36.9 °C)  Pulse:  [79-97] 85  Resp:  [15-20] 20  SpO2:  [96 %-100 %] 100 %  BP: (132-175)/(66-97) 142/74     Weight: 77.6 kg (171 lb) (10/07/19 1125)  Body mass index is 30.29 kg/m².  Body surface area is 1.86 meters squared.    No intake/output data recorded.    Physical Exam   Constitutional: She appears well-developed and well-nourished.   HENT:   Head: Normocephalic and atraumatic.   Eyes: Pupils are equal, round, and reactive to light. EOM are normal.   Neck: Normal range of  motion. Neck supple. No tracheal deviation present. No thyromegaly present.   Cardiovascular: Normal rate, regular rhythm and intact distal pulses.   No murmur heard.  Pulmonary/Chest: Effort normal. She has no wheezes.   Crackles at bases b/l R>L   Abdominal: Soft. Bowel sounds are normal. There is no tenderness. There is no rebound, no guarding and no CVA tenderness.   Musculoskeletal: Normal range of motion. She exhibits no edema or tenderness.   Lymphadenopathy:     She has no cervical adenopathy.   Neurological: She has normal reflexes. No cranial nerve deficit.   Skin: Skin is warm and dry.   CAROLE AVG with weak thrill, high pitched bruit.   Psychiatric: She has a normal mood and affect. Her behavior is normal.   Nursing note and vitals reviewed.      Significant Labs:  CBC:   Recent Labs   Lab 10/07/19  1218 10/07/19  1430   WBC 5.86  --    RBC 3.69*  --    HGB 12.0  --    HCT 38.2 36     --    *  --    MCH 32.5*  --    MCHC 31.4*  --      CMP:   Recent Labs   Lab 10/07/19  1218   *   CALCIUM 9.1   ALBUMIN 3.6   PROT 7.3      K 7.3*   CO2 19*   CL 99   *   CREATININE 9.4*   ALKPHOS 93   ALT 9*   AST 17   BILITOT 0.4     All labs within the past 24 hours have been reviewed.    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 I will follow-up with patient. Please contact us if you have any additional questions.    Mitchel Hernandez MD  Nephrology  [facility]    ATTENDING PHYSICIAN ATTESTATION  I have personally assessed and examined the patient. I thoroughly reviewed the demographic, clinical, laboratorial and imaging information available in medical records. I agree with the assessment and recommendations provided by the subspecialty resident who was under my supervision.     HEMODIALYSIS NOTE  Patient evaluated while undergoing hemodialysis indicated for ESRD. Tolerating session with current UFR, no complications.

## 2019-10-07 NOTE — HOSPITAL COURSE
10/07/2019 Admitted to [facility] [place] [place] w/ break-through seizures and hyperkalemia

## 2019-10-07 NOTE — ED NOTES
Pt's first and last name and birthday confirmed.   LOC: The patient is awake, alert and aware of environment with an appropriate affect, the patient is oriented x 3 and speaking appropriately.  APPEARANCE: Patient resting comfortably and in no acute distress, patient is clean and well groomed.  SKIN: The skin is warm and dry, patient has normal skin turgor and moist mucus membranes, skin intact, no breakdown or brusing noted.  MUSKULOSKELETAL: Patient moving right upper and lower extremity well; left side is paralyzed;  no obvious swelling or deformities noted.  RESPIRATORY: Airway is open and patent, respirations are spontaneous, patient has a normal effort and rate. Breath sounds are clear and equal bilaterally.  CARDIAC: Normal heart sounds. No peripheral edema.  ABDOMEN: Soft and non tender to palpation, no distention noted. Bowel sounds present.   NEURO: (See flow sheet)

## 2019-10-07 NOTE — CONSULTS
[facility]  Neurology  Consult Note    Patient Name: Lucy Perez  MRN: [id number]  Admission Date: 10/7/2019  Hospital Length of Stay: 0 days  Code Status: Prior   Attending Provider: Yady Richardson MD   Consulting Provider: Park Gibbs MD  Primary Care Physician: Beverly Muniz MD  Principal Problem:Epilepsy without status epilepticus    Inpatient consult to neurology  Consult performed by: Park Gibbs MD  Consult ordered by: Yousuf Gamez DO         Subjective:     Chief Complaint:  Break through seizures     HPI:   Patient is a 60 yo female w/ past medical history significant for ESRD on HD ( MWF) , R-MCA (hemorrhagic) w/ residual L hemiparesis (wheelchair-bound), CHF (last EF 60-65%, grade 2 diastolic dysfunction), RA, sarcoidosis  HTN , DM II who is currently undergoing evaluation for a possible kidney, kidney/pancreas or pancreas only transplant, and Hx of Epilepsy who presents with break through seizures.   Per  and daughter at bedside, who are able to provide history, patient was in her normal state of health until this AM. She did complain of a mild HA the night prior to admission. This morning as she was getting ready for her HD she had a typical GTC event w/ L sided head version lasting 2 minutes at around 8 AM. She later had another episode at 9:30, similar to the one mentioned above lasting 2 minutes after which decision was made to go to ER.   Per family, she has been compliant with her medication, denies any preceding S/S of infection or any exposure to anyone who is ill.   Per daughter, she was diagnosed w/ a UTI 4 weeks ago and after undergoing treatment w/ Abx, she again had positive Cx and was started on Amoxiclav course which she completed on Saturday.  EEG 7/23 -  Abnormal EEG due to the presence of moderately severe diffuse slowing and right hemispheric periodic and intermittent epileptiform discharges w/o overt seizure activity is  seen.  Neurology consulted for breakthrough seizures.      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    Cortical cataract of both eyes 7/26/2016    Decubitus ulcer of buttock, stage 2     Degeneration of lumbar or lumbosacral intervertebral disc 3/5/2013    S/p MRI L-spine 5/2009     Depression     Encounter for blood transfusion     ESRD on hemodialysis     Started August 2018    General anesthetics causing adverse effect in therapeutic use     Hemorrhagic cerebrovascular accident (CVA)     8/2016 s/p Hemicraniot[place] [facility] [place] with Left hemiparesis    History of stroke 6/28/2017    s/p R-MCA stroke with R-putaminal hemorrhagic transformation in 8/2016 and 11/2016 (s/p hemicraniotomy at [facility] [place]) with residual L hemiparesis, on AED s/p CVA      Hypertensive retinopathy of both eyes 7/26/2016    Impingement syndrome of right shoulder 7/2/2014    Obesity     THERESA (obstructive sleep apnea) 3/5/2013    No Home CPAP 2ndary to cost     Partial symptomatic epilepsy with complex partial seizures, not intractable, without status epilepticus     Rheumatoid arthritis(714.0)     Rotator cuff tear 7/2/2014    Sarcoidosis     Stroke 2016    left sided flaccidity, SAH    Vertebral artery stenosis 3/5/2013    S/p Stenting per Dr Burnett        Past Surgical History:   Procedure Laterality Date    BREAST SURGERY      breast reduction    COLONOSCOPY N/A 8/11/2016    Procedure: COLONOSCOPY;  Surgeon: Jerry Vilchis MD;  Location: [facility] ([address] [place] [address]);  Service: Endoscopy;  Laterality: N/A;  Patient reports difficulty awaking from anesthesia in the past.    DECLOTTING OF VASCULAR GRAFT Right  6/20/2019    Procedure: DECLOT-GRAFT;  Surgeon: Cabrera Irwin MD;  Location: [facility] CATH LAB;  Service: Cardiology;  Laterality: Right;    FISTULOGRAM Right 2/11/2019    Procedure: Fistulogram;  Surgeon: Meir Valencia MD;  Location: [facility] CATH LAB;  Service: Peripheral Vascular;  Laterality: Right;    FISTULOGRAM Right 7/8/2019    Procedure: Fistulogram;  Surgeon: WELLINGTON Palm III, MD;  Location: [facility] CATH LAB;  Service: Peripheral Vascular;  Laterality: Right;    HYSTERECTOMY  1999    JOSE LUIS/BSO (AUB)    PLACEMENT OF ARTERIOVENOUS GRAFT Right 10/18/2018    Procedure: AV GRAFT CREATION;  Surgeon: Meir Valencia MD;  Location: [facility] OR [address] [place] [address];  Service: Cardiovascular;  Laterality: Right;    ROTATOR CUFF REPAIR Right July 9, 2014    right side    Skull surgery      Aneurysm    stent placed      in vertebral artery    TOTAL REDUCTION MAMMOPLASTY      TUBAL LIGATION         Review of patient's allergies indicates:   Allergen Reactions    Lisinopril Other (See Comments)     Angioedema      Vicodin [hydrocodone-acetaminophen] Rash     No problem with acetaminophen        Current Neurological Medications:  Keppra 500 mg TID  Xanax 0.5 mg PRN  Cymbalta 25 mg QD      No current facility-administered medications on file prior to encounter.      Current Outpatient Medications on File Prior to Encounter   Medication Sig    acetaminophen (TYLENOL) 325 MG tablet Take 2 tablets (650 mg total) by mouth every 6 (six) hours as needed for Pain.    albuterol (PROVENTIL) 2.5 mg /3 mL (0.083 %) nebulizer solution Take 3 mLs (2.5 mg total) by nebulization every 6 (six) hours as needed for Wheezing. Rescue    albuterol (VENTOLIN HFA) 90 mcg/actuation inhaler Inhale 2 puffs into the lungs every 6 (six) hours as needed for Wheezing. Rescue    ALPRAZolam (XANAX) 0.5 MG tablet 1 tab Every 8 hours as needed for anxiety    amoxicillin-clavulanate 250-125mg (AUGMENTIN) 250-125 mg Tab Take 1 tablet by mouth every 12  "(twelve) hours.    aspirin (ECOTRIN) 81 MG EC tablet Take 81 mg by mouth every morning.     atorvastatin (LIPITOR) 40 MG tablet TAKE 1 TABLET ONE TIME DAILY FOR CHOLESTEROL    BD INSULIN PEN NEEDLE UF SHORT 31 gauge x 5/16" Ndle USE TO INJECT NOVOLOG FLEXPEN BEFORE MEALS    bisacodyl (DULCOLAX) 10 mg Supp Place 1 suppository (10 mg total) rectally daily as needed.    blood sugar diagnostic Strp To check BG 4  times daily, to use with insurance preferred meter-true metrix    blood-glucose meter kit To check BG 4 times daily, to use with insurance preferred meter-true metrix    buPROPion (WELLBUTRIN XL) 300 MG 24 hr tablet Take 1 tablet (300 mg total) by mouth once daily.    ciprofloxacin HCl (CIPRO) 500 MG tablet Take 1 tablet (500 mg total) by mouth every 12 (twelve) hours.    DULCOLAX, BISACODYL, ORAL Take by mouth as needed (constipation).    ergocalciferol (ERGOCALCIFEROL) 50,000 unit Cap Take 1 capsule (50,000 Units total) by mouth every 7 days.    famotidine (PEPCID) 20 MG tablet Take 1 tablet (20 mg total) by mouth once daily.    fluconazole (DIFLUCAN) 150 MG Tab Take one tablet by mouth once and may retreat in 3 days if still symptomatic    folic acid (FOLVITE) 1 MG tablet Take 1 tablet (1 mg total) by mouth once daily.    furosemide (LASIX) 40 MG tablet Take 1 tablet (40 mg total) by mouth 2 (two) times daily.    furosemide (LASIX) 40 MG tablet TAKE 1 TABLET BY MOUTH ONCE DAILY    glipiZIDE (GLUCOTROL) 2.5 MG TR24 Take 1 tablet (hold if less than 150) at lunch (non dialysis days)    insulin aspart U-100 (NOVOLOG U-100 INSULIN ASPART) 100 unit/mL injection Use correction scale 180-230+1, 231-280+2, 281-330+3, 331-380+4, >380+5, max 15 units.    insulin glargine (LANTUS U-100 INSULIN) 100 unit/mL injection Inject 10-12 Units into the skin every evening.    lancets Misc 1 lancet by Misc.(Non-Drug; Combo Route) route 2 (two) times daily with meals. Check glucose before meals and bed    " lancets Misc To check BG 4 times daily, to use with insurance preferred meter-true metrix    levETIRAcetam (KEPPRA) 500 MG Tab TAKE 1 TABLET BY MOUTH EVERY 8 HOURS    lidocaine-prilocaine (EMLA) cream Apply topically as needed.    polyethylene glycol (MIRALAX) 17 gram/dose powder Take 17 g by mouth 2 (two) times daily.    pregabalin (LYRICA) 25 MG capsule Take 1 capsule (25 mg total) by mouth once daily. May take additional dose after HD. (Patient taking differently: Take 25 mg by mouth daily as needed. May take additional dose after HD.)    sevelamer carbonate (RENVELA) 800 mg Tab Take 800 mg by mouth 3 (three) times daily with meals.    [] terconazole (TERAZOL 3) 0.8 % vaginal cream Place 1 applicator vaginally every evening. for 3 days    traMADol (ULTRAM) 50 mg tablet 1 tab Q6-8 hours as needed for joint pain    VITAMIN D2 50,000 unit capsule TAKE 1 CAPSULE PER G TUBE EVERY 7 DAYS     Family History     Problem Relation (Age of Onset)    Bell's palsy Sister    Blindness Maternal Grandmother    Breast cancer Mother    Diabetes Mother, Son,     Heart attack Mother    Heart disease Mother    Hypertension Mother, Sister    Lupus Sister    No Known Problems Daughter    Sleep apnea Sister    Stroke Sister, Maternal Aunt        Tobacco Use    Smoking status: Never Smoker    Smokeless tobacco: Never Used   Substance and Sexual Activity    Alcohol use: Yes     Comment: occasional wine cooler     Drug use: Never    Sexual activity: Yes     Partners: Male     Birth control/protection: Post-menopausal     Review of Systems   Unable to perform ROS: Mental status change   Neurological: Positive for seizures and weakness.     Objective:     Vital Signs (Most Recent):  Temp: 98.4 °F (36.9 °C) (10/07/19 1125)  Pulse: 92 (10/07/19 1421)  Resp: 15 (10/07/19 1421)  BP: (!) 175/97 (10/07/19 1421)  SpO2: 100 % (10/07/19 142) Vital Signs (24h Range):  Temp:  [98.4 °F (36.9 °C)] 98.4 °F (36.9 °C)  Pulse:  [79-97]  92  Resp:  [15-18] 15  SpO2:  [96 %-100 %] 100 %  BP: (132-175)/(66-97) 175/97     Weight: 77.6 kg (171 lb)  Body mass index is 30.29 kg/m².    Physical Exam  General:  Well-developed, well-nourished, lethargic, responsive to sternal rub and able to somewhat open eyes with repeated requests .   HEENT:  NCAT, PERRLA  Neck:  Supple, normal ROM without nuchal rigidity    Neurologic Exam:  Mental Status:  Lethargic, responsive to sternal rub, able to follow some midline and appendicular commands, but difficulty w/ verbal responses.   Cranial Nerves: PERRLA, EOMI. Tongue protrudes midline. Rowing eye movements noted  Motor:  Normal bulk and tone on R, increased tone on L.  RUE shoulder abduction 4/5, biceps/triceps4/5,  strength 5/5.  RLE hip flexors 3/5, knee flexion/extension 4-/5, plantarflexion/dorsiflexion 4/5.  Contracted on the LUE, withdraws to painful stimuli in UE and LE on L   Sensory:  Withdraws to painful stimuli.   Reflexes:  Biceps, brachioradialis 1+ on R and 3+ on L.  Coordination:  No resting tremor.  Gait:  Wheelchair-bound       Significant Labs:   Hemoglobin A1c: No results for input(s): HGBA1C in the last 720 hours.  Blood Culture: No results for input(s): LABBLOO in the last 48 hours.  BMP:   Recent Labs   Lab 10/07/19  1218   *      K 7.3*   CL 99   CO2 19*   *   CREATININE 9.4*   CALCIUM 9.1     CBC:   Recent Labs   Lab 10/07/19  1218 10/07/19  1430   WBC 5.86  --    HGB 12.0  --    HCT 38.2 36     --      CMP:   Recent Labs   Lab 10/07/19  1218   *      K 7.3*   CL 99   CO2 19*   *   CREATININE 9.4*   CALCIUM 9.1   PROT 7.3   ALBUMIN 3.6   BILITOT 0.4   ALKPHOS 93   AST 17   ALT 9*   ANIONGAP 20*   EGFRNONAA 4.1*     CSF Culture: No results for input(s): CSFCULTURE in the last 48 hours.  CSF Studies: No results for input(s): ALIQUT, APPEARCSF, COLORCSF, CSFWBC, CSFRBC, GLUCCSF, LDHCSF, PROTEINCSF, VDRLCSF in the last 48 hours.  Inflammatory  Markers: No results for input(s): SEDRATE, CRP, PROCAL in the last 48 hours.  POCT Glucose:   Recent Labs   Lab 10/07/19  1154 10/07/19  1418   POCTGLUCOSE 242* 168*     Prealbumin: No results for input(s): PREALBUMIN in the last 48 hours.  Respiratory Culture: No results for input(s): GSRESP, RESPIRATORYC in the last 48 hours.  Urine Culture: No results for input(s): LABURIN in the last 48 hours.  Urine Studies:   Recent Labs   Lab 10/07/19  1231   COLORU Yellow   APPEARANCEUA Clear   PHUR 8.0   SPECGRAV 1.010   PROTEINUA 2+*   GLUCUA 3+*   KETONESU Negative   BILIRUBINUA Negative   OCCULTUA 1+*   NITRITE Negative   LEUKOCYTESUR 2+*   RBCUA 3   WBCUA 8*   BACTERIA Few*   SQUAMEPITHEL 3   HYALINECASTS 1     All pertinent lab results from the past 24 hours have been reviewed.    Significant Imaging: I have reviewed all pertinent imaging results/findings within the past 24 hours.   CTH  - PENDING     Assessment and Plan:     * Epilepsy without status epilepticus  Patient is a 60 yo female w/ history of epilepsy and other comorbid conditions that are listed above presents to the ED w/ two break-through seizures as noted by the family at bedside despite compliance w/ her Keppra. She is currently lethargic, which is likely the combination of her being post-ictal as well as after receiving 2 mg ativan in the ED. Currently concerned for break through seizures in the setting of infection as she continues to have UA concerning for a UTI ( 2+ leukocytes, 8 WBC and few bacteria noted on today's labs) vs. toxicity 2/2 missed dialysis vs. medication non-compliance     - Keppra level pending  - 1.5 g load of Keppra in the ED s/p level labs  - Infectious work-up and treatment of UTI as per primary team   - HD per Nephrology to improve electrolyte levels   - Previous EEG w/ known focus as described above, would hold on on further EEG studies at this time   - If remains lethargic and her MS does not improve, can obtain an EEG   -  Agree w/ [facility] to evaluate for any acute intracranial abnormalities  - Avoid any seizure threshold lowering medication such as Wellbutrin, which was recently increased to 300 mg from 150 mg in the past 2 months, which appears on patient's MAR  - Unlikely to be a Benzo withdrawal seizure as patient has not taken her medication for a while, and only takes it PRN  - No further recommendations at this time, appreciate consult, will follow up with patients     ESRD (end stage renal disease) on dialysis  - Nephrology consulted at this time as patient missed her HD treatment this AM    Mixed anxiety and depressive disorder  - Please avoid seizure threshold lowering antidepressants such as Wellbutrin   - Consider medication change to another anti-depressant         VTE Risk Mitigation (From admission, onward)    None          Thank you for your consult. I will follow-up with patient. Please contact us if you have any additional questions.    Park Gibbs MD  Neurology  [facility]

## 2019-10-07 NOTE — HPI
Patient is a 58 yo female w/ past medical history significant for ESRD on HD ( MWF) , R-MCA(hemorrhagic) w/ residual L hemiparesis (wheelchair-bound), CHF (last EF 60-65%, grade 2 diastolic dysfunction), RA, sarcoidosis  HTN, DM II who is currently undergoing evaluation for a possible kidney, kidney/pancreas or pancreas only transplant, and Hx of Epilepsy who presents with break through seizures.   Per  and daughter at bedside, who are able to provide history, patient was in her normal state of health until this AM. She did complain of a mild HA the night prior to admission. This morning as she was getting ready for her HD she had a typical GTC event w/ L sided head version lasting 2 minutes at around 8 AM. She later had another episode at 9:30, similar to the one mentioned above lasting 2 minutes after which decision was made to go to ER.   Per family, she has been compliant with her medication, denies any preceding S/S of infection or any exposure to anyone who is ill.   Per daughter, she was diagnosed w/ a UTI 4 weeks ago and after undergoing treatment w/ Abx, she again had positive Cx and was started on Amoxiclav course which she completed on Saturday.  EEG 7/23 -  Abnormal EEG due to the presence of moderately severe diffuse slowing and right hemispheric periodic and intermittent epileptiform discharges w/o overt seizure activity is seen.  Neurology consulted for breakthrough seizures.

## 2019-10-07 NOTE — SUBJECTIVE & OBJECTIVE
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    Cortical cataract of both eyes 7/26/2016    Decubitus ulcer of buttock, stage 2     Degeneration of lumbar or lumbosacral intervertebral disc 3/5/2013    S/p MRI L-spine 5/2009     Depression     Encounter for blood transfusion     ESRD on hemodialysis     Started August 2018    General anesthetics causing adverse effect in therapeutic use     Hemorrhagic cerebrovascular accident (CVA)     8/2016 s/p Hemicraniotomy at [facility] with Left hemiparesis    History of stroke 6/28/2017    s/p R-MCA stroke with R-putaminal hemorrhagic transformation in 8/2016 and 11/2016 (s/p hemicraniotomy at [facility]) with residual L hemiparesis, on AED s/p CVA      Hypertensive retinopathy of both eyes 7/26/2016    Impingement syndrome of right shoulder 7/2/2014    Obesity     THERESA (obstructive sleep apnea) 3/5/2013    No Home CPAP 2ndary to cost     Partial symptomatic epilepsy with complex partial seizures, not intractable, without status epilepticus     Rheumatoid arthritis(714.0)     Rotator cuff tear 7/2/2014    Sarcoidosis     Stroke 2016    left sided flaccidity, SAH    Vertebral artery stenosis 3/5/2013    S/p Stenting per Dr Burnett        Past Surgical History:   Procedure Laterality Date    BREAST SURGERY      breast reduction    COLONOSCOPY N/A 8/11/2016    Procedure: COLONOSCOPY;  Surgeon: Jerry Vilchis MD;  Location: [address]);  Service: Endoscopy;  Laterality: N/A;  Patient reports difficulty awaking from anesthesia in the past.    DECLOTTING OF VASCULAR GRAFT Right 6/20/2019    Procedure: DECLOT-GRAFT;  Surgeon: Cabrera IBARRA  MD Alida;  Location: [facility] CATH LAB;  Service: Cardiology;  Laterality: Right;    FISTULOGRAM Right 2/11/2019    Procedure: Fistulogram;  Surgeon: Meir Valencia MD;  Location: [facility] CATH LAB;  Service: Peripheral Vascular;  Laterality: Right;    FISTULOGRAM Right 7/8/2019    Procedure: Fistulogram;  Surgeon: WELLINGTON Palm III, MD;  Location: [facility] CATH LAB;  Service: Peripheral Vascular;  Laterality: Right;    HYSTERECTOMY  1999    JOSE LUIS/BSO ([facility]B)    PLACEMENT OF ARTERIOVENOUS GRAFT Right 10/18/2018    Procedure: AV GRAFT CREATION;  Surgeon: Meir Valencia MD;  Location: [facility] OR [facility] FLR;  Service: Cardiovascular;  Laterality: Right;    ROTATOR CUFF REPAIR Right July 9, 2014    right side    Skull surgery      Aneurysm    stent placed      in vertebral artery    TOTAL REDUCTION MAMMOPLASTY      TUBAL LIGATION         Review of patient's allergies indicates:   Allergen Reactions    Lisinopril Other (See Comments)     Angioedema      Vicodin [hydrocodone-acetaminophen] Rash     No problem with acetaminophen        Current Neurological Medications:  Keppra 500 mg TID  Xanax 0.5 mg PRN  Cymbalta 25 mg QD      No current facility-administered medications on file prior to encounter.      Current Outpatient Medications on File Prior to Encounter   Medication Sig    acetaminophen (TYLENOL) 325 MG tablet Take 2 tablets (650 mg total) by mouth every 6 (six) hours as needed for Pain.    albuterol (PROVENTIL) 2.5 mg /3 mL (0.083 %) nebulizer solution Take 3 mLs (2.5 mg total) by nebulization every 6 (six) hours as needed for Wheezing. Rescue    albuterol (VENTOLIN HFA) 90 mcg/actuation inhaler Inhale 2 puffs into the lungs every 6 (six) hours as needed for Wheezing. Rescue    ALPRAZolam (XANAX) 0.5 MG tablet 1 tab Every 8 hours as needed for anxiety    amoxicillin-clavulanate 250-125mg (AUGMENTIN) 250-125 mg Tab Take 1 tablet by mouth every 12 (twelve) hours.    aspirin (ECOTRIN) 81 MG EC tablet Take 81 mg  "by mouth every morning.     atorvastatin (LIPITOR) 40 MG tablet TAKE 1 TABLET ONE TIME DAILY FOR CHOLESTEROL    BD INSULIN PEN NEEDLE UF SHORT 31 gauge x 5/16" Ndle USE TO INJECT NOVOLOG FLEXPEN BEFORE MEALS    bisacodyl (DULCOLAX) 10 mg Supp Place 1 suppository (10 mg total) rectally daily as needed.    blood sugar diagnostic Strp To check BG 4  times daily, to use with insurance preferred meter-true metrix    blood-glucose meter kit To check BG 4 times daily, to use with insurance preferred meter-true metrix    buPROPion (WELLBUTRIN XL) 300 MG 24 hr tablet Take 1 tablet (300 mg total) by mouth once daily.    ciprofloxacin HCl (CIPRO) 500 MG tablet Take 1 tablet (500 mg total) by mouth every 12 (twelve) hours.    DULCOLAX, BISACODYL, ORAL Take by mouth as needed (constipation).    ergocalciferol (ERGOCALCIFEROL) 50,000 unit Cap Take 1 capsule (50,000 Units total) by mouth every 7 days.    famotidine (PEPCID) 20 MG tablet Take 1 tablet (20 mg total) by mouth once daily.    fluconazole (DIFLUCAN) 150 MG Tab Take one tablet by mouth once and may retreat in 3 days if still symptomatic    folic acid (FOLVITE) 1 MG tablet Take 1 tablet (1 mg total) by mouth once daily.    furosemide (LASIX) 40 MG tablet Take 1 tablet (40 mg total) by mouth 2 (two) times daily.    furosemide (LASIX) 40 MG tablet TAKE 1 TABLET BY MOUTH ONCE DAILY    glipiZIDE (GLUCOTROL) 2.5 MG TR24 Take 1 tablet (hold if less than 150) at lunch (non dialysis days)    insulin aspart U-100 (NOVOLOG U-100 INSULIN ASPART) 100 unit/mL injection Use correction scale 180-230+1, 231-280+2, 281-330+3, 331-380+4, >380+5, max 15 units.    insulin glargine (LANTUS U-100 INSULIN) 100 unit/mL injection Inject 10-12 Units into the skin every evening.    lancets Misc 1 lancet by Misc.(Non-Drug; Combo Route) route 2 (two) times daily with meals. Check glucose before meals and bed    lancets Misc To check BG 4 times daily, to use with insurance preferred " meter-true metrix    levETIRAcetam (KEPPRA) 500 MG Tab TAKE 1 TABLET BY MOUTH EVERY 8 HOURS    lidocaine-prilocaine (EMLA) cream Apply topically as needed.    polyethylene glycol (MIRALAX) 17 gram/dose powder Take 17 g by mouth 2 (two) times daily.    pregabalin (LYRICA) 25 MG capsule Take 1 capsule (25 mg total) by mouth once daily. May take additional dose after HD. (Patient taking differently: Take 25 mg by mouth daily as needed. May take additional dose after HD.)    sevelamer carbonate (RENVELA) 800 mg Tab Take 800 mg by mouth 3 (three) times daily with meals.    [] terconazole (TERAZOL 3) 0.8 % vaginal cream Place 1 applicator vaginally every evening. for 3 days    traMADol (ULTRAM) 50 mg tablet 1 tab Q6-8 hours as needed for joint pain    VITAMIN D2 50,000 unit capsule TAKE 1 CAPSULE PER G TUBE EVERY 7 DAYS     Family History     Problem Relation (Age of Onset)    Bell's palsy Sister    Blindness Maternal Grandmother    Breast cancer Mother    Diabetes Mother, Son,     Heart attack Mother    Heart disease Mother    Hypertension Mother, Sister    Lupus Sister    No Known Problems Daughter    Sleep apnea Sister    Stroke Sister, Maternal Aunt        Tobacco Use    Smoking status: Never Smoker    Smokeless tobacco: Never Used   Substance and Sexual Activity    Alcohol use: Yes     Comment: occasional wine cooler     Drug use: Never    Sexual activity: Yes     Partners: Male     Birth control/protection: Post-menopausal     Review of Systems   Unable to perform ROS: Mental status change   Neurological: Positive for seizures and weakness.     Objective:     Vital Signs (Most Recent):  Temp: 98.4 °F (36.9 °C) (10/07/19 1125)  Pulse: 92 (10/07/19 1421)  Resp: 15 (10/07/19 1421)  BP: (!) 175/97 (10/07/19 1421)  SpO2: 100 % (10/07/19 1421) Vital Signs (24h Range):  Temp:  [98.4 °F (36.9 °C)] 98.4 °F (36.9 °C)  Pulse:  [79-97] 92  Resp:  [15-18] 15  SpO2:  [96 %-100 %] 100 %  BP: (132-175)/(66-97)  175/97     Weight: 77.6 kg (171 lb)  Body mass index is 30.29 kg/m².    Physical Exam  General:  Well-developed, well-nourished, lethargic, responsive to sternal rub and able to somewhat open eyes with repeated requests .   HEENT:  NCAT, PERRLA  Neck:  Supple, normal ROM without nuchal rigidity    Neurologic Exam:  Mental Status:  Lethargic, responsive to sternal rub, able to follow some midline and appendicular commands, but difficulty w/ verbal responses.   Cranial Nerves: PERRLA, EOMI. Tongue protrudes midline. Rowing eye movements noted  Motor:  Normal bulk and tone on R, increased tone on L.  RUE shoulder abduction 4/5, biceps/triceps4/5,  strength 5/5.  RLE hip flexors 3/5, knee flexion/extension 4-/5, plantarflexion/dorsiflexion 4/5.  Contracted on the LUE, withdraws to painful stimuli in UE and LE on L   Sensory:  Withdraws to painful stimuli.   Reflexes:  Biceps, brachioradialis 1+ on R and 3+ on L.  Coordination:  No resting tremor.  Gait:  Wheelchair-bound       Significant Labs:   Hemoglobin A1c: No results for input(s): HGBA1C in the last 720 hours.  Blood Culture: No results for input(s): LABBLOO in the last 48 hours.  BMP:   Recent Labs   Lab 10/07/19  1218   *      K 7.3*   CL 99   CO2 19*   *   CREATININE 9.4*   CALCIUM 9.1     CBC:   Recent Labs   Lab 10/07/19  1218 10/07/19  1430   WBC 5.86  --    HGB 12.0  --    HCT 38.2 36     --      CMP:   Recent Labs   Lab 10/07/19  1218   *      K 7.3*   CL 99   CO2 19*   *   CREATININE 9.4*   CALCIUM 9.1   PROT 7.3   ALBUMIN 3.6   BILITOT 0.4   ALKPHOS 93   AST 17   ALT 9*   ANIONGAP 20*   EGFRNONAA 4.1*     CSF Culture: No results for input(s): CSFCULTURE in the last 48 hours.  CSF Studies: No results for input(s): ALIQUT, APPEARCSF, COLORCSF, CSFWBC, CSFRBC, GLUCCSF, LDHCSF, PROTEINCSF, VDRLCSF in the last 48 hours.  Inflammatory Markers: No results for input(s): SEDRATE, CRP, PROCAL in the last 48  hours.  POCT Glucose:   Recent Labs   Lab 10/07/19  1154 10/07/19  1418   POCTGLUCOSE 242* 168*     Prealbumin: No results for input(s): PREALBUMIN in the last 48 hours.  Respiratory Culture: No results for input(s): GSRESP, RESPIRATORYC in the last 48 hours.  Urine Culture: No results for input(s): LABURIN in the last 48 hours.  Urine Studies:   Recent Labs   Lab 10/07/19  1231   COLORU Yellow   APPEARANCEUA Clear   PHUR 8.0   SPECGRAV 1.010   PROTEINUA 2+*   GLUCUA 3+*   KETONESU Negative   BILIRUBINUA Negative   OCCULTUA 1+*   NITRITE Negative   LEUKOCYTESUR 2+*   RBCUA 3   WBCUA 8*   BACTERIA Few*   SQUAMEPITHEL 3   HYALINECASTS 1     All pertinent lab results from the past 24 hours have been reviewed.    Significant Imaging: I have reviewed all pertinent imaging results/findings within the past 24 hours.   [facility]  - PENDING

## 2019-10-07 NOTE — ED NOTES
Dialysis still going on. PT resting on cardiac, bp and o2 monitor. RR 18 even and unlabored. Family at bedside.

## 2019-10-07 NOTE — PROGRESS NOTES
HD completed, toerated well, net uf 1500mk removed. , needles pulled and post bleeding time < 5 minutes. Post  B/P 124/57  Pulse 83

## 2019-10-07 NOTE — ED NOTES
Pt had a tonic-clonic seizure for approximately 2 minutes. Dr. Gamez at bedside. Pt maintained her airway throughout. Pt placed on 2 lpm NC following seizure. Pt now drowsy and confused. Respirations are spontaneous with normal rate and effort. Side rails up and padded.

## 2019-10-07 NOTE — ASSESSMENT & PLAN NOTE
Patient is a 58 yo female w/ history of epilepsy and other comorbid conditions that are listed above presents to the ED w/ two break-through seizures as noted by the family at bedside despite compliance w/ her Keppra. She is currently lethargic, which is likely the combination of her being post-ictal as well as after receiving 2 mg ativan in the ED.    toxicity 2/2 missed dialysis vs. medication non-compliance vs medication lower keppra threshold    Plan:   - Neurology following  - Keppra level pending  - 1.5 g load of Keppra in the ED  - If remains lethargic and her MS does not improve, can obtain an EEG   - CTH to evaluate for any acute intracranial abnormalities  - Avoid any seizure threshold lowering medication such as Wellbutrin, which was recently increased to 300 mg from 150 mg in the past 2 months, which appears on patient's MAR  - Starts home dose of keppra   - Ativan prn

## 2019-10-07 NOTE — HPI
58 yo F with a PMH of  ESRD secondary to HTN and Diabetes nephropathy on HD M,W,F Since August 2018 via RUE AVG, 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  and daughter provided history since patient is sedated after a dose of Ativan. Patient had a pounding headache last night and woke up with one this morning.  At approximately 8 am, patient had seizure in which she looked to the left, was unresponsive, and convulsive for approximately 2 minutes, afterwards she was in a post-ictal state of confusion for approximately 5 minutes.  Family chose not to report to hospital after that incident as they were on her way to her hemodialysis appointment.  Family reports another seizure-incident like the first one at 9:40 am, at which point they drove to the emergency room.  In the emergency room, patient has another seizure at 1 pm, witnessed by ER doctor after which she was administered Ativan, Potassium was found to be 7.3 with peaked T waves on EKG.     Nephrology History  iHD Schedule: M,W,F  Unit/MD: Dr Danny Lemons  Duration: 4 hours  UF: 3 L  EDW: 83.4  Access: RUE AVG   Residual Renal Function: minimal (less  Than a cup a day)

## 2019-10-07 NOTE — MEDICAL/APP STUDENT
History     Chief Complaint   Patient presents with    Seizures     on keppra, no missed doses, had 2 seizures today, due for dialysis today     History obtained from patient's daughter and  and she had received ativan less than one hour prior and was sedated.  Patient had a pounding headache last night and woke up with one this morning.  At approximately 8 am, patient had seizure in which she looked to the left, was unresponsive, and convulsive for approximately 2 minutes, afterwards she was in a post-ictal state of confusion for approximately 5 minutes.  Family chose not to report to hospital after that incident as they were on her way to her hemodialysis appointment.  Family reports another seizure-incident like the first one at 9:40 am, at which point they drove to the emergency room.  In the emergency room, patient has another seizure at 1 pm, witnessed by ER doctor.    Patient has taken keppra three times daily for three years since brain aneurysm and stroke.  In the first year post-stroke she had 2-3 episodes of seizure in which her eyes were focused and she was unresponsive.  She has not had another incident until 2 months ago when she had a seizure at home.    Seizures    This is a recurrent problem. The current episode started 3 to 5 hours ago. The problem has been resolved. There were 2 to 3 seizures. The most recent episode lasted 2 to 5 minutes. Associated symptoms include headaches. Characteristics include eye deviation, rhythmic jerking and loss of consciousness. The episode was witnessed. The seizures continued in the ED. The seizure(s) had right-sided focality.       Past Medical History:   Diagnosis Date    Anemia of chronic disease 6/10/2017    Anxiety     Arthritis of right acromioclavicular joint 7/2/2014    Asthma     Bipolar disorder     Bronchitis, acute     Cataract     Cholelithiasis     Chronic diastolic CHF (congestive heart failure)     Cognitive deficits following  nontraumatic intracerebral hemorrhage 10/22/2016    Cortical cataract of both eyes 7/26/2016    Decubitus ulcer of buttock, stage 2     Degeneration of lumbar or lumbosacral intervertebral disc 3/5/2013    S/p MRI L-spine 5/2009     Depression     Encounter for blood transfusion     ESRD on hemodialysis     Started August 2018    General anesthetics causing adverse effect in therapeutic use     Hemorrhagic cerebrovascular accident (CVA)     8/2016 s/p Hemicraniotomy at [facility] with Left hemiparesis    History of stroke 6/28/2017    s/p R-MCA stroke with R-putaminal hemorrhagic transformation in 8/2016 and 11/2016 (s/p hemicraniotomy at [facility]) with residual L hemiparesis, on AED s/p CVA      Hypertensive retinopathy of both eyes 7/26/2016    Impingement syndrome of right shoulder 7/2/2014    Obesity     THERESA (obstructive sleep apnea) 3/5/2013    No Home CPAP 2ndary to cost     Partial symptomatic epilepsy with complex partial seizures, not intractable, without status epilepticus     Rheumatoid arthritis(714.0)     Rotator cuff tear 7/2/2014    Sarcoidosis     Stroke 2016    left sided flaccidity, SAH    Vertebral artery stenosis 3/5/2013    S/p Stenting per Dr Burnett        Past Surgical History:   Procedure Laterality Date    BREAST SURGERY      breast reduction    COLONOSCOPY N/A 8/11/2016    Procedure: COLONOSCOPY;  Surgeon: Jerry Vilchis MD;  Location: [facility] ENDO ([address] [place] [address]);  Service: Endoscopy;  Laterality: N/A;  Patient reports difficulty awaking from anesthesia in the past.    DECLOTTING OF VASCULAR GRAFT Right 6/20/2019    Procedure: DECLOT-GRAFT;  Surgeon: Cabrera Irwin MD;  Location: [facility] CATH LAB;  Service: Cardiology;  Laterality: Right;    FISTULOGRAM Right 2/11/2019    Procedure: Fistulogram;  Surgeon: Meir Valencia MD;  Location: [facility] CATH LAB;  Service: Peripheral Vascular;  Laterality: Right;    FISTULOGRAM Right 7/8/2019    Procedure: Fistulogram;  Surgeon: WELLINGTON Palm  "III, MD;  Location: [facility] CATH LAB;  Service: Peripheral Vascular;  Laterality: Right;    HYSTERECTOMY  1999    JOSE LUIS/BSO ([facility])    PLACEMENT OF ARTERIOVENOUS GRAFT Right 10/18/2018    Procedure: AV GRAFT CREATION;  Surgeon: Meir Valencia MD;  Location: [facility] OR [address] [place] [address];  Service: Cardiovascular;  Laterality: Right;    ROTATOR CUFF REPAIR Right July 9, 2014    right side    Skull surgery      Aneurysm    stent placed      in vertebral artery    TOTAL REDUCTION MAMMOPLASTY      TUBAL LIGATION         Family History   Problem Relation Age of Onset    Diabetes Mother     Hypertension Mother     Heart disease Mother     Heart attack Mother     Breast cancer Mother     Stroke Sister     Hypertension Sister     Sleep apnea Sister     No Known Problems Daughter     Diabetes Son     Bell's palsy Sister     Lupus Sister     Blindness Maternal Grandmother     Diabetes Unknown         "My entire family family has diabetes"    Stroke Maternal Aunt     Colon cancer Neg Hx     Ovarian cancer Neg Hx        Social History     Tobacco Use    Smoking status: Never Smoker    Smokeless tobacco: Never Used   Substance Use Topics    Alcohol use: Yes     Comment: occasional wine cooler     Drug use: Never       Review of Systems   Neurological: Positive for seizures, loss of consciousness and headaches.       Physical Exam   BP (!) 165/73   Pulse 88   Temp 98.4 °F (36.9 °C) (Oral)   Resp 15   Ht 5' 3" (1.6 m)   Wt 77.6 kg (171 lb)   LMP  (LMP Unknown)   SpO2 100%   Breastfeeding? No   BMI 30.29 kg/m²     Physical Exam    ED Course         "

## 2019-10-07 NOTE — TELEPHONE ENCOUNTER
----- Message from Ernestina Palma sent at 10/7/2019  9:58 AM CDT -----  Contact: self/[phone]  Pt is calling stating she had two seizures this morning needing to speak with someone in the office as soon as possible. The pt is being taken to the ER just wanted to let the doctor know. Please advise.          Thank You

## 2019-10-07 NOTE — PROGRESS NOTES
HD in Emergency department initated, cannulated upper right arm avf with 15 gauge needles X 2. Good blood flow noted, / . uf goa; net set for 3000ml as tolerated. . B/P 153/80, pulse 88

## 2019-10-07 NOTE — ASSESSMENT & PLAN NOTE
on HD M,W,F Since August 2018 via [facility]     Plan:   - RRT emergently in the ER with a three hour session and a 2K bath  - Follow chemistry  - low potassium diet  - Cardiac Monitor  - intake and output  - daily weights

## 2019-10-07 NOTE — ASSESSMENT & PLAN NOTE
Patient is a 58 yo female w/ history of epilepsy and other comorbid conditions that are listed above presents to the ED w/ two break-through seizures as noted by the family at bedside despite compliance w/ her Keppra. She is currently lethargic, which is likely the combination of her being post-ictal as well as after receiving 2 mg ativan in the ED. Currently concerned for break through seizures in the setting of infection as she continues to have UA concerning for a UTI ( 2+ leukocytes, 8 WBC and few bacteria noted on today's labs) vs. toxicity 2/2 missed dialysis vs. medication non-compliance     - Keppra level pending  - 1.5 g load of Keppra in the ED s/p level labs  - Infectious work-up and treatment of UTI as per primary team   - HD per Nephrology to improve electrolyte levels   - Previous EEG w/ known focus as described above, would hold on on further EEG studies at this time   - If remains lethargic and her MS does not improve, can obtain an EEG   - Agree w/ CTH to evaluate for any acute intracranial abnormalities  - Avoid any seizure threshold lowering medication such as Wellbutrin, which was recently increased to 300 mg from 150 mg in the past 2 months, which appears on patient's MAR  - Unlikely to be a Benzo withdrawal seizure as patient has not taken her medication for a while, and only takes it PRN  - No further recommendations at this time, appreciate consult, will follow up with patients

## 2019-10-07 NOTE — ED TRIAGE NOTES
Pt presents with 2 witnessed seizures today the longest lasting 2 minutes. Pt was c/o headache prior to seizure. Pt has a history of epilepsy and takes Keppra and is compliant. Pt is awake, alert and oriented x 4 upon initial assessment.

## 2019-10-07 NOTE — ASSESSMENT & PLAN NOTE
On insulin at home   Plan:   - keep npo for now   - basal insulin of 6 unit + SC   - Accu check

## 2019-10-07 NOTE — ED NOTES
Telemetry Verification   Patient placed on Telemetry Box  Verified with War Room  Box # [id number]   Monitor Tech    Rate 81   Rhythm NSR

## 2019-10-07 NOTE — ED PROVIDER NOTES
Encounter Date: 10/7/2019       History     Chief Complaint   Patient presents with    Seizures     on keppra, no missed doses, had 2 seizures today, due for dialysis today     HPI   59-year-old female with a history of asthma, bipolar disorder, cholelithiasis, chronic diastolic CHF, anxiety, anemia of chronic disease, ESRD, hemodialysis on Monday Wednesday Friday, history of epilepsy presenting with multiple seizures this a.m. with fatigue, weakness and generalized malaise.  She is managed at home with Keppra, she also has a history of hypertension with grade 2 diastolic dysfunction.  She had 2 episodes of seizure at home this morning in the setting of compliance with her Keppra.  Patient states that she had a pounding headache last night, woke up with a seizure this morning associated with a headache. Her seizure lasted approximately 2-5 minutes, and patient and family proceeded to the dialysis center for her hemodialysis appointment.  When they drove to the appointment, she had another seizure-like incident that lasted approximately 5 min.  On arrival, she is awake, alert without a postictal period.  She is complaining of a 3/5 headache without radiation.  She denies any chest pain, fevers, chills, nausea, vomiting, diarrhea, back pain. She does endorse dysuria she has a history of a recent diagnosis UTI, undergoing antibiotic treatment.  She had a positive culture and was started on amoxicillin which was completed on Saturday.  She denies any falls or trauma at this time.  She denies any head injury. Per reports of her family, when she had her seizure she was sitting up I did not fall or hit the ground.  There is no tongue injury. No other aggravating or alleviating factors.     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    Cortical cataract of both eyes 7/26/2016    Decubitus ulcer of buttock, stage 2     Degeneration of lumbar or lumbosacral intervertebral disc 3/5/2013    S/p MRI L-spine 5/2009     Depression     Encounter for blood transfusion     ESRD on hemodialysis     Started August 2018    General anesthetics causing adverse effect in therapeutic use     Hemorrhagic cerebrovascular accident (CVA)     [place] [facility]    History of stroke 6/28/2017    s/p R-MCA stroke with R-putaminal hemorrhagic transformation in 8/2016 and 11/2016 ([place] [facility] [place] with residual L hemiparesis, on AED s/p CVA      Hypertensive retinopathy of both eyes 7/26/2016    Impingement syndrome of right shoulder 7/2/2014    Obesity     THERESA (obstructive sleep apnea) 3/5/2013    No Home CPAP 2ndary to cost     Partial symptomatic epilepsy with complex partial seizures, not intractable, without status epilepticus     Rheumatoid arthritis(714.0)     Rotator cuff tear 7/2/2014    Sarcoidosis     Stroke 2016    left sided flaccidity, SAH    Vertebral artery stenosis 3/5/2013    S/p Stenting per Dr Burnett      Past Surgical History:   Procedure Laterality Date    BREAST SURGERY      breast reduction    COLONOSCOPY N/A 8/11/2016    Procedure: COLONOSCOPY;  Surgeon: Jerry Vilchis MD;  Location: [facility] ENDO ([address] [place] [address]);  Service: Endoscopy;  Laterality: N/A;  Patient reports difficulty awaking from anesthesia in the past.    DECLOTTING OF VASCULAR GRAFT Right 6/20/2019    Procedure: DECLOT-GRAFT;  Surgeon: Cabrera Irwin MD;  Location: [facility] CATH LAB;   "Service: Cardiology;  Laterality: Right;    FISTULOGRAM Right 2/11/2019    Procedure: Fistulogram;  Surgeon: Meir Valencia MD;  Location: [facility] CATH LAB;  Service: Peripheral Vascular;  Laterality: Right;    FISTULOGRAM Right 7/8/2019    Procedure: Fistulogram;  Surgeon: WELLINGTON Palm III, MD;  Location: [facility] CATH LAB;  Service: Peripheral Vascular;  Laterality: Right;    HYSTERECTOMY  1999    JOSE LUIS/BSO (AUB)    PLACEMENT OF ARTERIOVENOUS GRAFT Right 10/18/2018    Procedure: AV GRAFT CREATION;  Surgeon: Meir Valencia MD;  Location: [facility] OR [facility] FLR;  Service: Cardiovascular;  Laterality: Right;    ROTATOR CUFF REPAIR Right July 9, 2014    right side    Skull surgery      Aneurysm    stent placed      in vertebral artery    TOTAL REDUCTION MAMMOPLASTY      TUBAL LIGATION       Family History   Problem Relation Age of Onset    Diabetes Mother     Hypertension Mother     Heart disease Mother     Heart attack Mother     Breast cancer Mother     Stroke Sister     Hypertension Sister     Sleep apnea Sister     No Known Problems Daughter     Diabetes Son     Bell's palsy Sister     Lupus Sister     Blindness Maternal Grandmother     Diabetes Unknown         "My entire family family has diabetes"    Stroke Maternal Aunt     Colon cancer Neg Hx     Ovarian cancer Neg Hx      Social History     Tobacco Use    Smoking status: Never Smoker    Smokeless tobacco: Never Used   Substance Use Topics    Alcohol use: Yes     Comment: occasional wine cooler     Drug use: Never     Review of Systems   Constitutional: Positive for fatigue. Negative for chills and fever.   HENT: Negative for congestion, sinus pain, sore throat and voice change.    Eyes: Negative for photophobia and visual disturbance.   Respiratory: Negative for choking, chest tightness, shortness of breath and wheezing.    Cardiovascular: Negative for chest pain and palpitations.   Gastrointestinal: Negative for abdominal distention, " abdominal pain, constipation, diarrhea, nausea and vomiting.   Endocrine: Negative for polydipsia and polyuria.   Genitourinary: Negative for dysuria, flank pain and pelvic pain.   Musculoskeletal: Negative for gait problem, myalgias, neck pain and neck stiffness.   Skin: Negative for color change and wound.   Allergic/Immunologic: Negative for immunocompromised state.   Neurological: Positive for headaches. Negative for seizures, syncope and weakness.   Hematological: Negative for adenopathy. Does not bruise/bleed easily.   Psychiatric/Behavioral: Negative for agitation, confusion and hallucinations. The patient is not nervous/anxious.        Physical Exam     Initial Vitals [10/07/19 1125]   BP Pulse Resp Temp SpO2   132/80 89 18 98.4 °F (36.9 °C) 96 %      MAP       --         Physical Exam    Nursing note and vitals reviewed.    Gen/Constitutional: Interactive. No acute distress on arrival.  Head: Normocephalic, Atraumatic  Neck: supple, no masses or LAD  Eyes: PERRLA, conjunctiva clear  Ears, Nose and Throat: No rhinorrhea or stridor.  Cardiac: Reg Rhythm, No murmur  Pulmonary:  Diminished breath sounds at bases, no wheeze, normal effort, no tachypnea, bibasilar crackles.  GI: Abdomen soft, non-tender, non-distended; no rebound or guarding  : No CVA tenderness.  Musculoskeletal: Extremities warm, well perfused, no erythema, no edema  Skin: No rashes  Neuro: Alert and Oriented x 3; No focal motor or sensory deficits.  Cranial nerves 2-12 intact, right upper extremity: av graft with weak thrill, high-pitched bruit. No postictal.  Gait not assessed.  No dysmetria.  Psych: Normal affect, pleasant.      ED Course   Critical Care  Date/Time: 10/7/2019 11:45 AM  Performed by: Yousuf Gamez DO  Authorized by: Yousuf Gamez DO   Direct patient critical care time: 25 minutes  Additional history critical care time: 12 minutes  Ordering / reviewing critical care time: 15 minutes  Documentation critical care time: 7  minutes  Consulting other physicians critical care time: 10 minutes  Consult with family critical care time: 5 minutes  Total critical care time (exclusive of procedural time) : 74 minutes  Critical care was necessary to treat or prevent imminent or life-threatening deterioration of the following conditions: metabolic crisis and renal failure (Seizures).  Critical care was time spent personally by me on the following activities: blood draw for specimens, development of treatment plan with patient or surrogate, discussions with consultants, evaluation of patient's response to treatment, examination of patient, obtaining history from patient or surrogate, ordering and performing treatments and interventions, ordering and review of laboratory studies, ordering and review of radiographic studies, pulse oximetry, re-evaluation of patient's condition and review of old charts.        Labs Reviewed   CBC W/ AUTO DIFFERENTIAL - Abnormal; Notable for the following components:       Result Value    RBC 3.69 (*)     Mean Corpuscular Volume 104 (*)     Mean Corpuscular Hemoglobin 32.5 (*)     Mean Corpuscular Hemoglobin Conc 31.4 (*)     Lymph # 0.9 (*)     Gran% 75.3 (*)     Lymph% 15.5 (*)     All other components within normal limits   COMPREHENSIVE METABOLIC PANEL - Abnormal; Notable for the following components:    Potassium 7.3 (*)     CO2 19 (*)     Glucose 215 (*)     BUN, Bld 106 (*)     Creatinine 9.4 (*)     ALT 9 (*)     Anion Gap 20 (*)     eGFR if  4.7 (*)     eGFR if non  4.1 (*)     All other components within normal limits    Narrative:     ADD ON LEVETIRACETAM LEVEL PER DR SHANTEL JACOB  10/07/2019  13:17    LIPASE - Abnormal; Notable for the following components:    Lipase 81 (*)     All other components within normal limits    Narrative:     ADD ON LEVETIRACETAM LEVEL PER DR SHANTEL JACOB  10/07/2019  13:17    URINALYSIS, REFLEX TO URINE CULTURE - Abnormal; Notable for the  following components:    Protein, UA 2+ (*)     Glucose, UA 3+ (*)     Occult Blood UA 1+ (*)     Leukocytes, UA 2+ (*)     All other components within normal limits    Narrative:     Preferred Collection Type->Urine, Catheterized   URINALYSIS MICROSCOPIC - Abnormal; Notable for the following components:    WBC, UA 8 (*)     Bacteria Few (*)     All other components within normal limits    Narrative:     Preferred Collection Type->Urine, Catheterized   POCT GLUCOSE - Abnormal; Notable for the following components:    POCT Glucose 242 (*)     All other components within normal limits   POCT GLUCOSE - Abnormal; Notable for the following components:    POCT Glucose 168 (*)     All other components within normal limits   ISTAT PROCEDURE - Abnormal; Notable for the following components:    POC Glucose 172 (*)     POC  (*)     POC Creatinine 10.4 (*)     POC Sodium 135 (*)     POC Potassium 6.8 (*)     POC Ionized Calcium 0.86 (*)     All other components within normal limits   TROPONIN I    Narrative:     ADD ON LEVETIRACETAM LEVEL PER DR SHANTEL JACOB  10/07/2019  13:17    TSH    Narrative:     ADD ON LEVETIRACETAM LEVEL PER DR SHANTEL JACOB  10/07/2019  13:17    LEVETIRACETAM  (KEPPRA) LEVEL   LEVETIRACETAM  (KEPPRA) LEVEL   POCT GLUCOSE   POCT GLUCOSE MONITORING CONTINUOUS     EKG Readings: (Independently Interpreted)   Initial Reading: No STEMI. Previous EKG: Compared with most recent EKG Rhythm: Normal Sinus Rhythm. Conduction: 1st Degree AV Block. Axis: Left Axis Deviation.   Peaked T-waves     ECG Results          EKG 12-lead (Final result)  Result time 10/07/19 19:21:31    Final result by Interface, Lab In [facility] (10/07/19 19:21:31)                 Narrative:    Test Reason :     Vent. Rate : 089 BPM     Atrial Rate : 089 BPM     P-R Int : 216 ms          QRS Dur : 092 ms      QT Int : 388 ms       P-R-T Axes : 067 -40 065 degrees     QTc Int : 472 ms    Sinus rhythm with 1st degree A-V block  Left axis  deviation  Abnormal ECG  When compared with ECG of 07-OCT-2019 12:20,  No significant change was found  Confirmed by CELSA CHENG MD ([id number]) on 10/7/2019 7:21:21 PM    Referred By: KESHIA   SELF           Confirmed By:CELSA CHENG MD                             EKG 12-lead (Final result)  Result time 10/07/19 13:52:28    Final result by Interface, Lab In [facility] (10/07/19 13:52:28)                 Narrative:    Test Reason : R56.9,    Vent. Rate : 082 BPM     Atrial Rate : 082 BPM     P-R Int : 232 ms          QRS Dur : 094 ms      QT Int : 396 ms       P-R-T Axes : 054 -46 067 degrees     QTc Int : 462 ms    Sinus rhythm with 1st degree A-V block  Left anterior fascicular block  Abnormal ECG  When compared with ECG of 05-SEP-2019 15:54,  No significant change was found  Confirmed by CELSA CHENG MD ([id number]) on 10/7/2019 1:52:22 PM    Referred By: KESHIA   SELF           Confirmed By:CELSA CHENG MD                            Imaging Results          CT Head Without Contrast (Final result)  Result time 10/07/19 19:38:46    Final result by Kevin Alvares MD (10/07/19 19:38:46)                 Impression:      1. 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  There is an additional lacunar infarct, remote, involving the left basal ganglia.  No convincing acute intracranial abnormalities or recent major vascular territory infarct.  No findings to suggest acute hemorrhage.  2. Mild sinus disease.      Electronically signed by: Kevin Alvares MD  Date:    10/07/2019  Time:    19:38             Narrative:    EXAMINATION:  CT HEAD WITHOUT CONTRAST    CLINICAL HISTORY:  Seizures new or progressive;    TECHNIQUE:  Low dose axial images were obtained through the head.  Coronal and sagittal reformations were also performed. Contrast was not administered.    COMPARISON:  CT 06/12/2017    FINDINGS:  There  is extensive motion artifact.    There is generalized cerebral volume loss.  There is hypoattenuation in a periventricular fashion, likely sequela of chronic microvascular ischemic change.  There is encephalomalacia in the right MCA territory similar to the previous MRI there is relative high attenuation of several gyri along the periphery of the parietal lobe, suggesting possible laminar necrosis/calcification related to prior hemosiderin deposition..  There is ex vacuo dilation of the lateral horn of the right lateral ventricle into the region of encephalomalacia, similar to the previous MRI.  There is a punctate focus of low attenuation within the left basal ganglia, suggesting sequela of remote infarct.  There is no hydrocephalus.  There are no abnormal extra-axial fluid collections.  There is mucous membrane thickening of the right maxillary sinus, otherwise the visualized paranasal sinuses and mastoid air cells are clear, and there is no evidence of calvarial fracture.  The visualized soft tissues are are remarkable for postsurgical changes 0 seated with prior right yulissa craniotomy..                                 Medical Decision Making:   History:   I obtained history from: someone other than patient.       <> Summary of History: Family  Old Medical Records: I decided to obtain old medical records.  Old Records Summarized: records from clinic visits and records from previous admission(s).  Initial Assessment:   59-year-old female with a history of asthma, bipolar disorder, cholelithiasis, chronic diastolic CHF, anxiety, anemia of chronic disease, ESRD, hemodialysis on Monday Wednesday Friday, history of epilepsy presenting with multiple seizures this a.m. with fatigue, weakness and generalized malaise.  Differential Diagnosis:   Differential diagnosis includes was not limited to:  Breakthrough seizures, acute metabolic encephalopathy, electrolyte imbalance, uremia, renal failure, hepatic failure,  intracranial hemorrhage, CVA/TIA, ACS, arrhythmia, UTI, meningitis.  Independently Interpreted Test(s):   I have ordered and independently interpreted X-rays - see prior notes.  I have ordered and independently interpreted EKG Reading(s) - see prior notes  Clinical Tests:   Lab Tests: Ordered and Reviewed  Radiological Study: Ordered and Reviewed  Medical Tests: Ordered and Reviewed  Other:   I have discussed this case with another health care provider.       <> Summary of the Discussion: Neurology, nephrology, hospital medicine    Emergent evaluation of patient presenting with epileptic seizures, in a patient with known epilepsy history.  She is afebrile, without tachycardia or hypoxemia on arrival.  Remainder of vital signs normal. Reported 2 seizures prior to arrival, without any postictal signs at this time.  Physical exam findings unremarkable, as patient is back to her baseline.  During our ED evaluation however patient began having a generalized tonic-clonic seizure in the ED.  Was immediately given 2 mg Ativan in the ED, and loaded with 1.5 g of Keppra after Keppra levels were obtained. IV line was placed, labs were drawn, airway protection observed.  Patient placed on oxygen, cardiac telemetry and pulse oximetry.  ECG obtained immediately, with signs of peaked T-waves with no ischemia or STEMI on my read.  Initial labs with elevated potassium, with repeat I-STAT with potassium of 6.8.  Patient is an end-stage renal disease, with missed dialysis today.  She has a urinary tract infection is noted on UA with 2+ leukocytes, and few bacteria.  Given her uremia as seen on CMP and i-STAT, discussed case with Nephrology.  Plan is to dialyze patient immediately in the emergency department given hyperkalemia.  Per Nephrology, would avoid any shifting and will continue to manage with dialysis.  She has ECGs changes on monitor from hyperkalemia.  Cardioprotective with calcium given in the ED.  Discussed case with  Neurology as well as patient had a generalized tonic-clonic seizure in the ED with 2 episodes prior to arrival.  Per their recommendations, obtain CT head, with no evidence of intracranial pathology.  Per neurology recommendations, avoid any seizure threshold lowering medications including Wellbutrin.  They will continue to follow along as needed.  Discussed case with hospital medicine team, patient will be admitted for ongoing management treatment of hyperkalemia, end-stage renal disease, and repeated breakthrough seizures.  Please see critical care note above for critical care time.  Patient and family agreeable for admission plan.    Complexity:  Critical care                      Clinical Impression:       ICD-10-CM ICD-9-CM   1. Seizure R56.9 780.39   2. Epilepsy without status epilepticus G40.909 345.90   3. ESRD (end stage renal disease) on dialysis N18.6 585.6    Z99.2 V45.11   4. Hyperkalemia E87.5 276.7   5. Anemia in ESRD (end-stage renal disease) N18.6 285.21    D63.1 585.6   6. High anion gap metabolic acidosis E87.2 276.2         Disposition:   Disposition: Discharged  Condition: Stable       Yousuf Gamez DO  Dept of Emergency Medicine   [facility]  Spectralink: [id number]                   Yousuf Gamez DO  10/08/19 1026

## 2019-10-07 NOTE — SUBJECTIVE & OBJECTIVE
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    Cortical cataract of both eyes 7/26/2016    Decubitus ulcer of buttock, stage 2     Degeneration of lumbar or lumbosacral intervertebral disc 3/5/2013    S/p MRI L-spine 5/2009     Depression     Encounter for blood transfusion     ESRD on hemodialysis     Started August 2018    General anesthetics causing adverse effect in therapeutic use     Hemorrhagic cerebrovascular accident (CVA)     8[place]2016 [place] [facility] [place]    History of stroke 6/28/2017    s/p R-MCA stroke with R-putaminal hemorrhagic transformation in 8/2016 and 11/2016 ([place] [facility] [place] with residual L hemiparesis, on AED s/p CVA      Hypertensive retinopathy of both eyes 7/26/2016    Impingement syndrome of right shoulder 7/2/2014    Obesity     THERESA (obstructive sleep apnea) 3/5/2013    No Home CPAP 2ndary to cost     Partial symptomatic epilepsy with complex partial seizures, not intractable, without status epilepticus     Rheumatoid arthritis(714.0)     Rotator cuff tear 7/2/2014    Sarcoidosis     Stroke 2016    left sided flaccidity, SAH    Vertebral artery stenosis 3/5/2013    S/p Stenting per Dr Burnett        Past Surgical History:   Procedure Laterality Date    BREAST SURGERY      breast reduction    COLONOSCOPY N/A 8/11/2016    Procedure: COLONOSCOPY;  Surgeon: Jerry Vilchis MD;  Location: [address]);  Service: Endoscopy;  Laterality: N/A;  Patient reports difficulty awaking from anesthesia in the past.    DECLOTTING OF VASCULAR GRAFT Right 6/20/2019    Procedure: DECLOT-GRAFT;  Surgeon: Cabrera IBARRA  "MD Alida;  Location: [facility] CATH LAB;  Service: Cardiology;  Laterality: Right;    FISTULOGRAM Right 2/11/2019    Procedure: Fistulogram;  Surgeon: Meir Valencia MD;  Location: [facility] CATH LAB;  Service: Peripheral Vascular;  Laterality: Right;    FISTULOGRAM Right 7/8/2019    Procedure: Fistulogram;  Surgeon: WELLINGTON Palm III, MD;  Location: [facility] CATH LAB;  Service: Peripheral Vascular;  Laterality: Right;    HYSTERECTOMY  1999    JOSE LUIS/BSO (AUB)    PLACEMENT OF ARTERIOVENOUS GRAFT Right 10/18/2018    Procedure: AV GRAFT CREATION;  Surgeon: Meir Valencia MD;  Location: [facility] OR [facility] FLR;  Service: Cardiovascular;  Laterality: Right;    ROTATOR CUFF REPAIR Right July 9, 2014    right side    Skull surgery      Aneurysm    stent placed      in vertebral artery    TOTAL REDUCTION MAMMOPLASTY      TUBAL LIGATION         Review of patient's allergies indicates:   Allergen Reactions    Lisinopril Other (See Comments)     Angioedema      Vicodin [hydrocodone-acetaminophen] Rash     No problem with acetaminophen      Current Facility-Administered Medications   Medication Frequency    calcium gluconate 1g in dextrose 5% 100mL (ready to mix system) ED 1 Time     Current Outpatient Medications   Medication    acetaminophen (TYLENOL) 325 MG tablet    albuterol (PROVENTIL) 2.5 mg /3 mL (0.083 %) nebulizer solution    albuterol (VENTOLIN HFA) 90 mcg/actuation inhaler    ALPRAZolam (XANAX) 0.5 MG tablet    amoxicillin-clavulanate 250-125mg (AUGMENTIN) 250-125 mg Tab    aspirin (ECOTRIN) 81 MG EC tablet    atorvastatin (LIPITOR) 40 MG tablet    BD INSULIN PEN NEEDLE UF SHORT 31 gauge x 5/16" Ndle    bisacodyl (DULCOLAX) 10 mg Supp    blood sugar diagnostic Strp    blood-glucose meter kit    buPROPion (WELLBUTRIN XL) 300 MG 24 hr tablet    ciprofloxacin HCl (CIPRO) 500 MG tablet    DULCOLAX, BISACODYL, ORAL    ergocalciferol (ERGOCALCIFEROL) 50,000 unit Cap    famotidine (PEPCID) 20 MG tablet    " fluconazole (DIFLUCAN) 150 MG Tab    folic acid (FOLVITE) 1 MG tablet    furosemide (LASIX) 40 MG tablet    furosemide (LASIX) 40 MG tablet    glipiZIDE (GLUCOTROL) 2.5 MG TR24    insulin aspart U-100 (NOVOLOG U-100 INSULIN ASPART) 100 unit/mL injection    insulin glargine (LANTUS U-100 INSULIN) 100 unit/mL injection    lancets Misc    lancets Misc    levETIRAcetam (KEPPRA) 500 MG Tab    lidocaine-prilocaine (EMLA) cream    polyethylene glycol (MIRALAX) 17 gram/dose powder    pregabalin (LYRICA) 25 MG capsule    sevelamer carbonate (RENVELA) 800 mg Tab    traMADol (ULTRAM) 50 mg tablet    VITAMIN D2 50,000 unit capsule     Family History     Problem Relation (Age of Onset)    Bell's palsy Sister    Blindness Maternal Grandmother    Breast cancer Mother    Diabetes Mother, Son,     Heart attack Mother    Heart disease Mother    Hypertension Mother, Sister    Lupus Sister    No Known Problems Daughter    Sleep apnea Sister    Stroke Sister, Maternal Aunt        Tobacco Use    Smoking status: Never Smoker    Smokeless tobacco: Never Used   Substance and Sexual Activity    Alcohol use: Yes     Comment: occasional wine cooler     Drug use: Never    Sexual activity: Yes     Partners: Male     Birth control/protection: Post-menopausal     Review of Systems   Unable to perform ROS: Mental status change   Neurological: Positive for seizures and weakness.     Objective:     Vital Signs (Most Recent):  Temp: 98.4 °F (36.9 °C) (10/07/19 1125)  Pulse: 85 (10/07/19 1524)  Resp: 20 (10/07/19 1524)  BP: (!) 142/74 (10/07/19 1516)  SpO2: 100 % (10/07/19 1524) Vital Signs (24h Range):  Temp:  [98.4 °F (36.9 °C)] 98.4 °F (36.9 °C)  Pulse:  [79-97] 85  Resp:  [15-20] 20  SpO2:  [96 %-100 %] 100 %  BP: (132-175)/(66-97) 142/74     Weight: 77.6 kg (171 lb) (10/07/19 1125)  Body mass index is 30.29 kg/m².  Body surface area is 1.86 meters squared.    No intake/output data recorded.    Physical Exam   Constitutional:  She appears well-developed and well-nourished.   HENT:   Head: Normocephalic and atraumatic.   Eyes: Pupils are equal, round, and reactive to light. EOM are normal.   Neck: Normal range of motion. Neck supple. No tracheal deviation present. No thyromegaly present.   Cardiovascular: Normal rate, regular rhythm and intact distal pulses.   No murmur heard.  Pulmonary/Chest: Effort normal. She has no wheezes.   Crackles at bases b/l R>L   Abdominal: Soft. Bowel sounds are normal. There is no tenderness. There is no rebound, no guarding and no CVA tenderness.   Musculoskeletal: Normal range of motion. She exhibits no edema or tenderness.   Lymphadenopathy:     She has no cervical adenopathy.   Neurological: She has normal reflexes. No cranial nerve deficit.   Skin: Skin is warm and dry.   CAROLE AVG with weak thrill, high pitched bruit.   Psychiatric: She has a normal mood and affect. Her behavior is normal.   Nursing note and vitals reviewed.      Significant Labs:  CBC:   Recent Labs   Lab 10/07/19  1218 10/07/19  1430   WBC 5.86  --    RBC 3.69*  --    HGB 12.0  --    HCT 38.2 36     --    *  --    MCH 32.5*  --    MCHC 31.4*  --      CMP:   Recent Labs   Lab 10/07/19  1218   *   CALCIUM 9.1   ALBUMIN 3.6   PROT 7.3      K 7.3*   CO2 19*   CL 99   *   CREATININE 9.4*   ALKPHOS 93   ALT 9*   AST 17   BILITOT 0.4     All labs within the past 24 hours have been reviewed.    Significant Imaging:  Labs: Reviewed  X-Ray: Reviewed

## 2019-10-07 NOTE — ASSESSMENT & PLAN NOTE
- Please avoid seizure threshold lowering antidepressants such as Wellbutrin   - Consider medication change to another anti-depressant

## 2019-10-08 ENCOUNTER — TELEPHONE (OUTPATIENT)
Dept: INTERNAL MEDICINE | Facility: CLINIC | Age: 59
End: 2019-10-08

## 2019-10-08 ENCOUNTER — PATIENT OUTREACH (OUTPATIENT)
Dept: ADMINISTRATIVE | Facility: OTHER | Age: 59
End: 2019-10-08

## 2019-10-08 VITALS
TEMPERATURE: 98 F | RESPIRATION RATE: 16 BRPM | BODY MASS INDEX: 30.31 KG/M2 | SYSTOLIC BLOOD PRESSURE: 123 MMHG | WEIGHT: 171.06 LBS | DIASTOLIC BLOOD PRESSURE: 80 MMHG | HEIGHT: 63 IN | HEART RATE: 76 BPM | OXYGEN SATURATION: 97 %

## 2019-10-08 DIAGNOSIS — F41.8 MIXED ANXIETY AND DEPRESSIVE DISORDER: Primary | ICD-10-CM

## 2019-10-08 PROBLEM — E87.5 HYPERKALEMIA: Status: RESOLVED | Noted: 2019-06-21 | Resolved: 2019-10-08

## 2019-10-08 PROBLEM — E87.29 HIGH ANION GAP METABOLIC ACIDOSIS: Status: RESOLVED | Noted: 2019-10-07 | Resolved: 2019-10-08

## 2019-10-08 LAB
ANION GAP SERPL CALC-SCNC: 13 MMOL/L (ref 8–16)
BASOPHILS # BLD AUTO: 0.04 K/UL (ref 0–0.2)
BASOPHILS NFR BLD: 1 % (ref 0–1.9)
BUN SERPL-MCNC: 48 MG/DL (ref 8–23)
CALCIUM SERPL-MCNC: 9.3 MG/DL (ref 8.7–10.5)
CHLORIDE SERPL-SCNC: 99 MMOL/L (ref 95–110)
CO2 SERPL-SCNC: 26 MMOL/L (ref 23–29)
CREAT SERPL-MCNC: 5.9 MG/DL (ref 0.5–1.4)
DIFFERENTIAL METHOD: ABNORMAL
EOSINOPHIL # BLD AUTO: 0.1 K/UL (ref 0–0.5)
EOSINOPHIL NFR BLD: 2.9 % (ref 0–8)
ERYTHROCYTE [DISTWIDTH] IN BLOOD BY AUTOMATED COUNT: 13.2 % (ref 11.5–14.5)
EST. GFR  (AFRICAN AMERICAN): 8.2 ML/MIN/1.73 M^2
EST. GFR  (NON AFRICAN AMERICAN): 7.1 ML/MIN/1.73 M^2
GLUCOSE SERPL-MCNC: 81 MG/DL (ref 70–110)
HCT VFR BLD AUTO: 39.1 % (ref 37–48.5)
HGB BLD-MCNC: 11.8 G/DL (ref 12–16)
IMM GRANULOCYTES # BLD AUTO: 0.01 K/UL (ref 0–0.04)
IMM GRANULOCYTES NFR BLD AUTO: 0.2 % (ref 0–0.5)
LYMPHOCYTES # BLD AUTO: 0.9 K/UL (ref 1–4.8)
LYMPHOCYTES NFR BLD: 22.5 % (ref 18–48)
MAGNESIUM SERPL-MCNC: 2.5 MG/DL (ref 1.6–2.6)
MCH RBC QN AUTO: 32.2 PG (ref 27–31)
MCHC RBC AUTO-ENTMCNC: 30.2 G/DL (ref 32–36)
MCV RBC AUTO: 107 FL (ref 82–98)
MONOCYTES # BLD AUTO: 0.4 K/UL (ref 0.3–1)
MONOCYTES NFR BLD: 9.3 % (ref 4–15)
NEUTROPHILS # BLD AUTO: 2.7 K/UL (ref 1.8–7.7)
NEUTROPHILS NFR BLD: 64.1 % (ref 38–73)
NRBC BLD-RTO: 0 /100 WBC
PHOSPHATE SERPL-MCNC: 5.3 MG/DL (ref 2.7–4.5)
PLATELET # BLD AUTO: 180 K/UL (ref 150–350)
PMV BLD AUTO: 9.9 FL (ref 9.2–12.9)
POCT GLUCOSE: 148 MG/DL (ref 70–110)
POCT GLUCOSE: 70 MG/DL (ref 70–110)
POTASSIUM SERPL-SCNC: 5.2 MMOL/L (ref 3.5–5.1)
RBC # BLD AUTO: 3.66 M/UL (ref 4–5.4)
SODIUM SERPL-SCNC: 138 MMOL/L (ref 136–145)
WBC # BLD AUTO: 4.18 K/UL (ref 3.9–12.7)

## 2019-10-08 PROCEDURE — 99238 HOSP IP/OBS DSCHRG MGMT 30/<: CPT | Mod: HCNC,GC,NTX, | Performed by: HOSPITALIST

## 2019-10-08 PROCEDURE — 84100 ASSAY OF PHOSPHORUS: CPT | Mod: HCNC,NTX

## 2019-10-08 PROCEDURE — 83735 ASSAY OF MAGNESIUM: CPT | Mod: HCNC,NTX

## 2019-10-08 PROCEDURE — 63600175 PHARM REV CODE 636 W HCPCS: Mod: HCNC,NTX | Performed by: STUDENT IN AN ORGANIZED HEALTH CARE EDUCATION/TRAINING PROGRAM

## 2019-10-08 PROCEDURE — 94761 N-INVAS EAR/PLS OXIMETRY MLT: CPT | Mod: HCNC,NTX

## 2019-10-08 PROCEDURE — 99238 PR HOSPITAL DISCHARGE DAY,<30 MIN: ICD-10-PCS | Mod: HCNC,GC,NTX, | Performed by: HOSPITALIST

## 2019-10-08 PROCEDURE — 25000003 PHARM REV CODE 250: Mod: HCNC,NTX | Performed by: STUDENT IN AN ORGANIZED HEALTH CARE EDUCATION/TRAINING PROGRAM

## 2019-10-08 PROCEDURE — 80048 BASIC METABOLIC PNL TOTAL CA: CPT | Mod: HCNC,NTX

## 2019-10-08 PROCEDURE — 85025 COMPLETE CBC W/AUTO DIFF WBC: CPT | Mod: HCNC,NTX

## 2019-10-08 PROCEDURE — 99900035 HC TECH TIME PER 15 MIN (STAT): Mod: HCNC,NTX

## 2019-10-08 PROCEDURE — 36415 COLL VENOUS BLD VENIPUNCTURE: CPT | Mod: HCNC,NTX

## 2019-10-08 RX ORDER — LEVETIRACETAM 500 MG/1
500 TABLET ORAL
Qty: 36 TABLET | Refills: 3 | Status: SHIPPED | OUTPATIENT
Start: 2019-10-09 | End: 2020-12-08

## 2019-10-08 RX ORDER — LEVETIRACETAM 500 MG/1
500 TABLET ORAL EVERY 8 HOURS
Status: DISCONTINUED | OUTPATIENT
Start: 2019-10-08 | End: 2019-10-08 | Stop reason: HOSPADM

## 2019-10-08 RX ORDER — SODIUM CHLORIDE 9 MG/ML
INJECTION, SOLUTION INTRAVENOUS
Status: DISCONTINUED | OUTPATIENT
Start: 2019-10-08 | End: 2019-10-08 | Stop reason: HOSPADM

## 2019-10-08 RX ORDER — SODIUM CHLORIDE 9 MG/ML
INJECTION, SOLUTION INTRAVENOUS ONCE
Status: DISCONTINUED | OUTPATIENT
Start: 2019-10-08 | End: 2019-10-08 | Stop reason: HOSPADM

## 2019-10-08 RX ORDER — LEVETIRACETAM 1000 MG/1
1000 TABLET ORAL DAILY
Qty: 90 TABLET | Refills: 3 | Status: SHIPPED | OUTPATIENT
Start: 2019-10-08 | End: 2020-10-07

## 2019-10-08 RX ADMIN — ATORVASTATIN CALCIUM 40 MG: 20 TABLET, FILM COATED ORAL at 07:10

## 2019-10-08 RX ADMIN — FOLIC ACID 1 MG: 1 TABLET ORAL at 07:10

## 2019-10-08 RX ADMIN — SEVELAMER CARBONATE 800 MG: 800 TABLET, FILM COATED ORAL at 12:10

## 2019-10-08 RX ADMIN — ASPIRIN 81 MG: 81 TABLET, COATED ORAL at 05:10

## 2019-10-08 RX ADMIN — FAMOTIDINE 20 MG: 20 TABLET ORAL at 07:10

## 2019-10-08 RX ADMIN — LEVETIRACETAM 500 MG: 500 TABLET, FILM COATED ORAL at 07:10

## 2019-10-08 RX ADMIN — SEVELAMER CARBONATE 800 MG: 800 TABLET, FILM COATED ORAL at 07:10

## 2019-10-08 RX ADMIN — HEPARIN SODIUM 5000 UNITS: 5000 INJECTION, SOLUTION INTRAVENOUS; SUBCUTANEOUS at 05:10

## 2019-10-08 NOTE — ASSESSMENT & PLAN NOTE
- as in epilepsy without status  - patient was on bupropion with increase in dose in last 2 months  - bupropion discontinued as it can lower the seizure threshold and should be avoided in patient with history of epilepsy and known focus of epileptiform discharge  - will need close PCP review of safer medication choice in this patient

## 2019-10-08 NOTE — PLAN OF CARE
Patient discharged home to care of Family on 10/8/19.     10/08/19 1558   Final Note   Assessment Type Final Discharge Note   Anticipated Discharge Disposition Home   What phone number can be called within the next 1-3 days to see how you are doing after discharge?   ([phone])   Hospital Follow Up  Appt(s) scheduled? Yes   Discharge plans and expectations educations in teach back method with documentation complete? Yes   Right Care Referral Info   Post Acute Recommendation No Care

## 2019-10-08 NOTE — DISCHARGE INSTRUCTIONS
Please change the dose of Keppra that you take    Keppra 1000mg every day    And     Keppra 500mg following dialysis on Monday, Wednesday, Friday    Please do NOT take any more Tramadol or Wellbutrin - these medications can cause seizure to be more likely

## 2019-10-08 NOTE — SUBJECTIVE & OBJECTIVE
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    Cortical cataract of both eyes 7/26/2016    Decubitus ulcer of buttock, stage 2     Degeneration of lumbar or lumbosacral intervertebral disc 3/5/2013    S/p MRI L-spine 5/2009     Depression     Encounter for blood transfusion     ESRD on hemodialysis     Started August 2018    General anesthetics causing adverse effect in therapeutic use     Hemorrhagic cerebrovascular accident (CVA)     8/2016 [place]/p [place]iot[place] [facility] [place] hemip[place]is    History of stroke 6/28/2017    s/p R-MCA stroke with R-putaminal hemorrhagic transformation in 8/2016 and 11/2016 (s/p [place]micraniot[place] [facility] [place] with residual L hemiparesis, on AED s/p CVA      Hypertensive retinopathy of both eyes 7/26/2016    Impingement syndrome of right shoulder 7/2/2014    Obesity     THERESA (obstructive sleep apnea) 3/5/2013    No Home CPAP 2ndary to cost     Partial symptomatic epilepsy with complex partial seizures, not intractable, without status epilepticus     Rheumatoid arthritis(714.0)     Rotator cuff tear 7/2/2014    Sarcoidosis     Stroke 2016    left sided flaccidity, SAH    Vertebral artery stenosis 3/5/2013    S/p Stenting per Dr Burnett        Past Surgical History:   Procedure Laterality Date    BREAST SURGERY      breast reduction    COLONOSCOPY N/A 8/11/2016    Procedure: COLONOSCOPY;  Surgeon: Jerry Vilchis MD;  Location: [address]);  Service: Endoscopy;  Laterality: N/A;  Patient reports difficulty awaking from anesthesia in the past.    DECLOTTING OF VASCULAR GRAFT Right 6/20/2019    Procedure: DECLOT-GRAFT;  Surgeon: Cabrera IBARRA  MD Alida;  Location: [facility] CATH LAB;  Service: Cardiology;  Laterality: Right;    FISTULOGRAM Right 2/11/2019    Procedure: Fistulogram;  Surgeon: Meir Valencia MD;  Location: [facility] CATH LAB;  Service: Peripheral Vascular;  Laterality: Right;    FISTULOGRAM Right 7/8/2019    Procedure: Fistulogram;  Surgeon: WELLINGTON Palm III, MD;  Location: [facility] CATH LAB;  Service: Peripheral Vascular;  Laterality: Right;    HYSTERECTOMY  1999    JOSE LUIS/BSO (AUB)    PLACEMENT OF ARTERIOVENOUS GRAFT Right 10/18/2018    Procedure: AV GRAFT CREATION;  Surgeon: Meir Valencia MD;  Location: [facility] OR [facility] FLR;  Service: Cardiovascular;  Laterality: Right;    ROTATOR CUFF REPAIR Right July 9, 2014    right side    Skull surgery      Aneurysm    stent placed      in vertebral artery    TOTAL REDUCTION MAMMOPLASTY      TUBAL LIGATION         Review of patient's allergies indicates:   Allergen Reactions    Lisinopril Other (See Comments)     Angioedema      Vicodin [hydrocodone-acetaminophen] Rash     No problem with acetaminophen        No current facility-administered medications on file prior to encounter.      Current Outpatient Medications on File Prior to Encounter   Medication Sig    acetaminophen (TYLENOL) 325 MG tablet Take 2 tablets (650 mg total) by mouth every 6 (six) hours as needed for Pain.    albuterol (PROVENTIL) 2.5 mg /3 mL (0.083 %) nebulizer solution Take 3 mLs (2.5 mg total) by nebulization every 6 (six) hours as needed for Wheezing. Rescue    albuterol (VENTOLIN HFA) 90 mcg/actuation inhaler Inhale 2 puffs into the lungs every 6 (six) hours as needed for Wheezing. Rescue    ALPRAZolam (XANAX) 0.5 MG tablet 1 tab Every 8 hours as needed for anxiety    amoxicillin-clavulanate 250-125mg (AUGMENTIN) 250-125 mg Tab Take 1 tablet by mouth every 12 (twelve) hours.    aspirin (ECOTRIN) 81 MG EC tablet Take 81 mg by mouth every morning.     atorvastatin (LIPITOR) 40 MG tablet TAKE 1 TABLET ONE TIME DAILY  "FOR CHOLESTEROL    BD INSULIN PEN NEEDLE UF SHORT 31 gauge x 5/16" Ndle USE TO INJECT NOVOLOG FLEXPEN BEFORE MEALS    bisacodyl (DULCOLAX) 10 mg Supp Place 1 suppository (10 mg total) rectally daily as needed.    blood sugar diagnostic Strp To check BG 4  times daily, to use with insurance preferred meter-true metrix    blood-glucose meter kit To check BG 4 times daily, to use with insurance preferred meter-true metrix    buPROPion (WELLBUTRIN XL) 300 MG 24 hr tablet Take 1 tablet (300 mg total) by mouth once daily.    ciprofloxacin HCl (CIPRO) 500 MG tablet Take 1 tablet (500 mg total) by mouth every 12 (twelve) hours.    DULCOLAX, BISACODYL, ORAL Take by mouth as needed (constipation).    ergocalciferol (ERGOCALCIFEROL) 50,000 unit Cap Take 1 capsule (50,000 Units total) by mouth every 7 days.    famotidine (PEPCID) 20 MG tablet Take 1 tablet (20 mg total) by mouth once daily.    fluconazole (DIFLUCAN) 150 MG Tab Take one tablet by mouth once and may retreat in 3 days if still symptomatic    folic acid (FOLVITE) 1 MG tablet Take 1 tablet (1 mg total) by mouth once daily.    furosemide (LASIX) 40 MG tablet Take 1 tablet (40 mg total) by mouth 2 (two) times daily.    furosemide (LASIX) 40 MG tablet TAKE 1 TABLET BY MOUTH ONCE DAILY    glipiZIDE (GLUCOTROL) 2.5 MG TR24 Take 1 tablet (hold if less than 150) at lunch (non dialysis days)    insulin aspart U-100 (NOVOLOG U-100 INSULIN ASPART) 100 unit/mL injection Use correction scale 180-230+1, 231-280+2, 281-330+3, 331-380+4, >380+5, max 15 units.    insulin glargine (LANTUS U-100 INSULIN) 100 unit/mL injection Inject 10-12 Units into the skin every evening.    lancets Misc 1 lancet by Misc.(Non-Drug; Combo Route) route 2 (two) times daily with meals. Check glucose before meals and bed    lancets Misc To check BG 4 times daily, to use with insurance preferred meter-true metrix    levETIRAcetam (KEPPRA) 500 MG Tab TAKE 1 TABLET BY MOUTH EVERY 8 HOURS "    lidocaine-prilocaine (EMLA) cream Apply topically as needed.    polyethylene glycol (MIRALAX) 17 gram/dose powder Take 17 g by mouth 2 (two) times daily.    pregabalin (LYRICA) 25 MG capsule Take 1 capsule (25 mg total) by mouth once daily. May take additional dose after HD. (Patient taking differently: Take 25 mg by mouth daily as needed. May take additional dose after HD.)    sevelamer carbonate (RENVELA) 800 mg Tab Take 800 mg by mouth 3 (three) times daily with meals.    [] terconazole (TERAZOL 3) 0.8 % vaginal cream Place 1 applicator vaginally every evening. for 3 days    traMADol (ULTRAM) 50 mg tablet 1 tab Q6-8 hours as needed for joint pain    VITAMIN D2 50,000 unit capsule TAKE 1 CAPSULE PER G TUBE EVERY 7 DAYS     Family History     Problem Relation (Age of Onset)    Bell's palsy Sister    Blindness Maternal Grandmother    Breast cancer Mother    Diabetes Mother, Son,     Heart attack Mother    Heart disease Mother    Hypertension Mother, Sister    Lupus Sister    No Known Problems Daughter    Sleep apnea Sister    Stroke Sister, Maternal Aunt        Tobacco Use    Smoking status: Never Smoker    Smokeless tobacco: Never Used   Substance and Sexual Activity    Alcohol use: Yes     Comment: occasional wine cooler     Drug use: Never    Sexual activity: Yes     Partners: Male     Birth control/protection: Post-menopausal     Review of Systems   Unable to perform ROS: Mental status change     Objective:     Vital Signs (Most Recent):  Temp: 98.4 °F (36.9 °C) (10/07/19 1125)  Pulse: 82 (10/07/19 184)  Resp: (!) 22 (10/07/19 1830)  BP: 115/67 (10/07/19 184)  SpO2: 100 % (10/07/19 1846) Vital Signs (24h Range):  Temp:  [98.4 °F (36.9 °C)] 98.4 °F (36.9 °C)  Pulse:  [79-97] 82  Resp:  [15-23] 22  SpO2:  [96 %-100 %] 100 %  BP: ()/(62-97) 115/67     Weight: 77.6 kg (171 lb)  Body mass index is 30.29 kg/m².    Physical Exam   Constitutional: She is oriented to person, place, and time.  She appears well-developed and well-nourished.   Hemiparesis in left side    HENT:   Head: Normocephalic and atraumatic.   Eyes: Pupils are equal, round, and reactive to light. EOM are normal.   Neck: Normal range of motion. Neck supple.   Cardiovascular: Normal rate, regular rhythm and normal heart sounds.   Pulmonary/Chest: Effort normal. No respiratory distress. She has no wheezes. She has rales (at lung bases ).   Abdominal: Soft. Bowel sounds are normal. She exhibits no distension.   Musculoskeletal: Normal range of motion. She exhibits no edema or deformity.   Neurological: She is alert and oriented to person, place, and time.   Skin: Skin is warm and dry.   Psychiatric: She has a normal mood and affect.         CRANIAL NERVES     CN III, IV, VI   Pupils are equal, round, and reactive to light.  Extraocular motions are normal.        Significant Labs:   CBC:   Recent Labs   Lab 10/07/19  1218 10/07/19  1430   WBC 5.86  --    HGB 12.0  --    HCT 38.2 36     --      CMP:   Recent Labs   Lab 10/07/19  1218      K 7.3*   CL 99   CO2 19*   *   *   CREATININE 9.4*   CALCIUM 9.1   PROT 7.3   ALBUMIN 3.6   BILITOT 0.4   ALKPHOS 93   AST 17   ALT 9*   ANIONGAP 20*   EGFRNONAA 4.1*     Lactic Acid: No results for input(s): LACTATE in the last 48 hours.  Troponin:   Recent Labs   Lab 10/07/19  1218   TROPONINI 0.024     All pertinent labs within the past 24 hours have been reviewed.    Significant Imaging: CT: I have reviewed all pertinent results/findings within the past 24 hours and my personal findings are:      1. 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  There is an additional lacunar infarct, remote, involving the left basal ganglia.  No convincing acute intracranial abnormalities or recent major vascular territory infarct.  No findings to suggest acute hemorrhage.  2. Mild  sinus disease.

## 2019-10-08 NOTE — PROGRESS NOTES
[facility]  Neurology  Progress Note    Patient Name: Lucy Perez  MRN: [id number]  Admission Date: 10/7/2019  Hospital Length of Stay: 1 days  Code Status: Full Code   Attending Provider: Yady Richardson MD  Primary Care Physician: Beverly Muniz MD   Principal Problem:Epilepsy without status epilepticus      Subjective:     Interval History: Patient has returned to baseline, awake and alert and pleasantly conversant. Denies any pain or any other symptoms as well as any seizures overnight.    Current Neurological Medications:   Keppra 500 mg TID   Folic acid  1mg QD    Current Facility-Administered Medications   Medication Dose Route Frequency Provider Last Rate Last Dose    albuterol sulfate nebulizer solution 2.5 mg  2.5 mg Nebulization Q4H PRN Maciej Harvey MD        aspirin EC tablet 81 mg  81 mg Oral QAM Kaia Ferraro MD   81 mg at 10/08/19 0541    atorvastatin tablet 40 mg  40 mg Oral Daily Kaia Ferraro MD   40 mg at 10/08/19 0746    dextrose 10% (D10W) Bolus  12.5 g Intravenous PRN Kaia Ferraro MD        famotidine tablet 20 mg  20 mg Oral Daily Kaia Ferraro MD   20 mg at 10/08/19 0745    folic acid tablet 1 mg  1 mg Oral Daily Kaia Ferraro MD   1 mg at 10/08/19 0744    glucagon (human recombinant) injection 1 mg  1 mg Intramuscular PRN Kaia Ferraro MD        heparin (porcine) injection 5,000 Units  5,000 Units Subcutaneous Q8H Kaia Ferraro MD   5,000 Units at 10/08/19 0541    insulin aspart U-100 pen 0-5 Units  0-5 Units Subcutaneous Q6H PRN Kaia Ferraro MD        insulin detemir U-100 pen 6 Units  6 Units Subcutaneous QHS Kaia Ferraro MD   6 Units at 10/07/19 2224    levETIRAcetam tablet 500 mg  500 mg Oral Q8H Kaia Ferraro MD        polyethylene glycol packet 17 g  17 g Oral Once Kaia Ferraro MD   Stopped at 10/07/19 1715    sevelamer carbonate tablet 800 mg  800 mg Oral TID WM Kaia Ferraro MD   800 mg at 10/08/19 0745    sodium chloride 0.9% flush 10 mL   10 mL Intravenous PRN Kaia Ferraro MD           Review of Systems   Constitutional: Negative.    HENT: Negative.    Eyes: Negative.    Respiratory: Negative.    Cardiovascular: Negative.    Gastrointestinal: Negative.    Genitourinary: Negative.    Neurological: Positive for seizures, facial asymmetry and weakness.   Hematological: Negative.    Psychiatric/Behavioral: Negative.      Objective:     Vital Signs (Most Recent):  Temp: 97.5 °F (36.4 °C) (10/08/19 0741)  Pulse: 73 (10/08/19 0741)  Resp: 18 (10/08/19 0741)  BP: (!) 129/58 (10/08/19 0741)  SpO2: (!) 92 % (10/08/19 0741) Vital Signs (24h Range):  Temp:  [97 °F (36.1 °C)-98.4 °F (36.9 °C)] 97.5 °F (36.4 °C)  Pulse:  [65-97] 73  Resp:  [15-23] 18  SpO2:  [92 %-100 %] 92 %  BP: ()/(53-99) 129/58     Weight: 77.6 kg (171 lb 1.2 oz)  Body mass index is 30.3 kg/m².    Physical Exam  General:  Well-developed, well-nourished, awake/alert w/ NAD  HEENT:  NCAT, PERRLA  Neck:  Supple, normal ROM without nuchal rigidity     Neurologic Exam:  Mental Status:  AAOx3.  Speech, thought content appropriate.  Recent, remote recall 3/3.  Cranial Nerves:  VFs intact on counting fingers in all quadrants bilaterally.  PERRLA, EOMI.  L facial weakness.  Palate raises symmetrically, tongue protrudes midline.    Motor:  Normal bulk and tone on R, increased tone on L.  RUE shoulder abduction 4/5, biceps/triceps4/5,  strength 5/5.  RLE hip flexors 3/5, knee flexion/extension 4-/5, plantarflexion/dorsiflexion 4/5.  Contracted on the LUE, withdraws to painful stimuli in UE and LE on L   Sensory:  Intact to light touch at all extremities and face without inattention.    Reflexes:  Biceps, brachioradialis 1+ on R and 3+ on L.  Coordination:  FNF is intact with no dysmetria on the R.  No resting tremor.  Gait:  Wheelchair-bound       Significant Labs:   Hemoglobin A1c: No results for input(s): HGBA1C in the last 720 hours.  Blood Culture: No results for input(s): LABBLOO in the  last 48 hours.  BMP:   Recent Labs   Lab 10/07/19  1218 10/07/19  2124 10/08/19  0558   * 123* 81    138 138   K 7.3* 4.4 5.2*   CL 99 98 99   CO2 19* 29 26   * 43* 48*   CREATININE 9.4* 5.3* 5.9*   CALCIUM 9.1 9.0 9.3   MG  --   --  2.5     CBC:   Recent Labs   Lab 10/07/19  1218 10/07/19  1430 10/08/19  0558   WBC 5.86  --  4.18   HGB 12.0  --  11.8*   HCT 38.2 36 39.1     --  180     CMP:   Recent Labs   Lab 10/07/19  1218 10/07/19  2124 10/08/19  0558   * 123* 81    138 138   K 7.3* 4.4 5.2*   CL 99 98 99   CO2 19* 29 26   * 43* 48*   CREATININE 9.4* 5.3* 5.9*   CALCIUM 9.1 9.0 9.3   MG  --   --  2.5   PROT 7.3  --   --    ALBUMIN 3.6 3.4*  --    BILITOT 0.4  --   --    ALKPHOS 93  --   --    AST 17  --   --    ALT 9*  --   --    ANIONGAP 20* 11 13   EGFRNONAA 4.1* 8.1* 7.1*     CSF Culture: No results for input(s): CSFCULTURE in the last 48 hours.  CSF Studies: No results for input(s): ALIQUT, APPEARCSF, COLORCSF, CSFWBC, CSFRBC, GLUCCSF, LDHCSF, PROTEINCSF, VDRLCSF in the last 48 hours.  Inflammatory Markers: No results for input(s): SEDRATE, CRP, PROCAL in the last 48 hours.  POCT Glucose:   Recent Labs   Lab 10/07/19  1710 10/07/19  2223 10/08/19  0735   POCTGLUCOSE 107 140* 70     Prealbumin: No results for input(s): PREALBUMIN in the last 48 hours.  Respiratory Culture: No results for input(s): GSRESP, RESPIRATORYC in the last 48 hours.  Urine Culture: No results for input(s): LABURIN in the last 48 hours.  Urine Studies:   Recent Labs   Lab 10/07/19  1231   COLORU Yellow   APPEARANCEUA Clear   PHUR 8.0   SPECGRAV 1.010   PROTEINUA 2+*   GLUCUA 3+*   KETONESU Negative   BILIRUBINUA Negative   OCCULTUA 1+*   NITRITE Negative   LEUKOCYTESUR 2+*   RBCUA 3   WBCUA 8*   BACTERIA Few*   SQUAMEPITHEL 3   HYALINECASTS 1     All pertinent lab results from the past 24 hours have been reviewed.    Significant Imaging: I have reviewed all pertinent imaging  results/findings within the past 24 hours.   CTH (10/08/2019)    1. 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  There is an additional lacunar infarct, remote, involving the left basal ganglia.  No convincing acute intracranial abnormalities or recent major vascular territory infarct.  No findings to suggest acute hemorrhage.  2. Mild sinus disease.    Assessment and Plan:     * Epilepsy without status epilepticus  Patient is a 60 yo female w/ history of epilepsy and other comorbid conditions that are listed above presents to the ED w/ two break-through seizures as noted by the family at bedside despite compliance w/ her Keppra. She is currently lethargic, which is likely the combination of her being post-ictal as well as after receiving 2 mg ativan in the ED. Currently concerned for break through seizures in the setting of infection as she continues to have UA concerning for a UTI ( 2+ leukocytes, 8 WBC and few bacteria noted on today's labs) vs. toxicity 2/2 missed dialysis vs. medication non-compliance     - Keppra level pending  - 1.5 g load of Keppra in the ED s/p level labs  - Would recommend alternative Keppra dosing at this time to fit w/ patient's renal HD status   - 1000 mg QD of Keppra    - Additional 500 mg post dialysis on HD days only   - Infectious work-up and treatment of UTI as per primary team   - Deferred as per primary team as patient is asymptomatic   - HD per Nephrology to improve electrolyte levels    - Completed on 10/07  - Previous EEG w/ known focus as described above, would hold on on further EEG studies at this time   - Patient improved back to baseline  - CTH w/ stable encephalomalacia involving the R MCA territory; no acute intracranial process  - Avoid any seizure threshold lowering medication such as Wellbutrin, which was recently increased to 300 mg from 150 mg in the past 2  months, which appears on patient's MAR   - Patient does have a history of depression that required treatment, so would be prudent to start an anti-depressant to replace the Wellbutrin   - Unlikely to be a Benzo withdrawal seizure as patient has not taken her medication for a while, and only takes it PRN  - Patient should follow up in General Neurology clinic within 6 weeks of discharge for medication management and to establish care   - No further recommendations at this time, will sign off at this time     ESRD (end stage renal disease) on dialysis  - s/p HD on 10/7    Mixed anxiety and depressive disorder  - Please avoid seizure threshold lowering antidepressants such as Wellbutrin   - Consider medication change to another anti-depressant         VTE Risk Mitigation (From admission, onward)         Ordered     heparin (porcine) injection 5,000 Units  Every 8 hours      10/07/19 1648     IP VTE HIGH RISK PATIENT  Once      10/07/19 1648                Park Gibbs MD  Neurology  [facility]

## 2019-10-08 NOTE — PLAN OF CARE
10/08/19 1317   Discharge Assessment   Assessment Type Discharge Planning Assessment   Confirmed/corrected address and phone number on facesheet? Yes   Assessment information obtained from? Caregiver   Communicated expected length of stay with patient/caregiver yes   Prior to hospitilization cognitive status: Alert/Oriented   Prior to hospitalization functional status: Independent   Current cognitive status: Alert/Oriented   Current Functional Status: Independent   Lives With alone   Able to Return to Prior Arrangements yes   Is patient able to care for self after discharge? Yes   Who are your caregiver(s) and their phone number(s)?   (Wilder Perez (Spouse) [phone])   Patient's perception of discharge disposition admitted as an inpatient   Readmission Within the Last 30 Days no previous admission in last 30 days   Patient currently being followed by outpatient case management? No   Patient currently receives any other outside agency services? No   Equipment Currently Used at Home wheelchair   Do you have any problems affording any of your prescribed medications? No   Is the patient taking medications as prescribed? yes   Does the patient have transportation home? Yes   Transportation Anticipated family or friend will provide   Does the patient receive services at the [facility]? No   Discharge Plan A Home with family   Discharge Plan B Home with family   DME Needed Upon Discharge  bedside commode;shower chair;wheelchair;hospital bed;lift device;CPAP;oxygen   Patient/Family in Agreement with Plan yes   Readmission Questionnaire   Have you felt down, depressed, or hopeless? 0

## 2019-10-08 NOTE — NURSING
Patient being discharged home per orders.  Discharge instructions reviewed with patient and family to include follow up appointment, medications, and activity restrictions. Tele box removed, IV removed, site clear without s/s of infection, patient ayala well.  Patient and family verbalized above  discharge instructions.  Will cont POC and treatment.

## 2019-10-08 NOTE — ASSESSMENT & PLAN NOTE
Patient is a 60 yo female w/ history of epilepsy and other comorbid conditions that are listed above presents to the ED w/ two break-through seizures as noted by the family at bedside despite compliance w/ her Keppra. She is currently lethargic, which is likely the combination of her being post-ictal as well as after receiving 2 mg ativan in the ED.    toxicity 2/2 missed dialysis vs. medication non-compliance vs medication lower keppra threshold    Plan:   - Keppra level pending  - modified keppra dose to reflect HD dosing for discharge 1000mg of Keppra QD and 500mg following HD on HD days  - CTH showed no new abnormality   - Avoid any seizure threshold lowering medication such as Wellbutrin, which was recently increased to 300 mg from 150 mg in the past 2 months, which appears on patient's MAR (Wellbutrin discontinued and will need to follow up with PCP for safer medication for anxiety/depression)

## 2019-10-08 NOTE — H&P
[facility] Medicine  History & Physical    Patient Name: Lucy Perez  MRN: [id number]  Admission Date: 10/7/2019  Attending Physician: Yady Richardson MD   Primary Care Provider: Beverly Muniz MD    [facility] Medicine Team: [facility]OS[facility] MED 5 Kaia Ferraro MD     Patient information was obtained from patient, spouse/SO, past medical records and ER records.     Subjective:     Principal Problem:Epilepsy without status epilepticus    Chief Complaint:   Chief Complaint   Patient presents with    Seizures     on keppra, no missed doses, had 2 seizures today, due for dialysis today        HPI: 60 yo F with a PMH of  ESRD secondary to HTN and Diabetes nephropathy on HD M,W,F Since August 2018 via RUE AVG, CVA s/p aneurysm rupture with residual hemiparesis, seizure disorder on Keppra,CHF (last EF 60-65%, grade 2 diastolic dysfunction), HTN, presents with 2 episodes of seizure from Home.  and daughter provided history since patient is sedated after a dose of Ativan. Patient had a pounding headache last night and woke up with one this morning.  At approximately 8 am, patient had seizure in which she looked to the left, was unresponsive, and convulsive for approximately 2 minutes, afterwards she was in a post-ictal state of confusion for approximately 5 minutes.  Family chose not to report to hospital after that incident as they were on her way to her hemodialysis appointment.  Family reports another seizure-incident like the first one at 9:40 am, at which point they drove to the emergency room.  In the emergency room, patient has another seizure at 1 pm, witnessed by ER doctor after which she was administered Ativan, Potassium was found to be 7.3 with peaked T waves on EKG.     Per family, she has been compliant with her medication, denies any preceding S/S of infection or any exposure to anyone who is ill.   Per daughter, she was diagnosed w/ a UTI 4 weeks ago and after  undergoing treatment w/ Abx, she again had positive Cx and was started on Amoxiclav course which she completed on Saturday.    EEG 7/23 -  Abnormal EEG due to the presence of moderately severe diffuse slowing and right hemispheric periodic and intermittent epileptiform discharges w/o overt seizure activity is seen.  Neurology consulted for breakthrough seizures.    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    Cortical cataract of both eyes 7/26/2016    Decubitus ulcer of buttock, stage 2     Degeneration of lumbar or lumbosacral intervertebral disc 3/5/2013    S/p MRI L-spine 5/2009     Depression     Encounter for blood transfusion     ESRD on hemodialysis     Started August 2018    General anesthetics causing adverse effect in therapeutic use     Hemorrhagic cerebrovascular accident (CVA)     8/2016 s/p Hemicraniotomy at [facility] with [place] hemiparesis    History of stroke 6/28/2017    s/p R-MCA stroke with R-putaminal hemorrhagic transformation in 8/2016 and 11/2016 (s/p hemicraniotomy at [facility]) with residual L hemiparesis, on AED s/p CVA      Hypertensive retinopathy of both eyes 7/26/2016    Impingement syndrome of right shoulder 7/2/2014    Obesity     THERESA (obstructive sleep apnea) 3/5/2013    No Home CPAP 2ndary to cost     Partial symptomatic epilepsy with complex partial seizures, not intractable, without status epilepticus     Rheumatoid arthritis(714.0)     Rotator cuff tear 7/2/2014    Sarcoidosis     Stroke 2016    left sided flaccidity, SAH    Vertebral artery stenosis 3/5/2013    S/p Stenting per Dr Burnett        Past Surgical History:   Procedure Laterality Date     BREAST SURGERY      breast reduction    COLONOSCOPY N/A 8/11/2016    Procedure: COLONOSCOPY;  Surgeon: Jerry Vilchis MD;  Location: [facility] ENDO (4TH FLR);  Service: Endoscopy;  Laterality: N/A;  Patient reports difficulty awaking from anesthesia in the past.    DECLOTTING OF VASCULAR GRAFT Right 6/20/2019    Procedure: DECLOT-GRAFT;  Surgeon: Cabrera Irwin MD;  Location: [facility] CATH LAB;  Service: Cardiology;  Laterality: Right;    FISTULOGRAM Right 2/11/2019    Procedure: Fistulogram;  Surgeon: Meir Valencia MD;  Location: [facility] CATH LAB;  Service: Peripheral Vascular;  Laterality: Right;    FISTULOGRAM Right 7/8/2019    Procedure: Fistulogram;  Surgeon: WELLINGTON Palm III, MD;  Location: [facility] CATH LAB;  Service: Peripheral Vascular;  Laterality: Right;    HYSTERECTOMY  1999    JOSE LUIS/BSO (AUB)    PLACEMENT OF ARTERIOVENOUS GRAFT Right 10/18/2018    Procedure: AV GRAFT CREATION;  Surgeon: Meir Valencia MD;  Location: [facility] OR 2ND FLR;  Service: Cardiovascular;  Laterality: Right;    ROTATOR CUFF REPAIR Right July 9, 2014    right side    Skull surgery      Aneurysm    stent placed      in vertebral artery    TOTAL REDUCTION MAMMOPLASTY      TUBAL LIGATION         Review of patient's allergies indicates:   Allergen Reactions    Lisinopril Other (See Comments)     Angioedema      Vicodin [hydrocodone-acetaminophen] Rash     No problem with acetaminophen        No current facility-administered medications on file prior to encounter.      Current Outpatient Medications on File Prior to Encounter   Medication Sig    acetaminophen (TYLENOL) 325 MG tablet Take 2 tablets (650 mg total) by mouth every 6 (six) hours as needed for Pain.    albuterol (PROVENTIL) 2.5 mg /3 mL (0.083 %) nebulizer solution Take 3 mLs (2.5 mg total) by nebulization every 6 (six) hours as needed for Wheezing. Rescue    albuterol (VENTOLIN HFA) 90 mcg/actuation inhaler Inhale 2 puffs into the lungs every 6 (six) hours as  "needed for Wheezing. Rescue    ALPRAZolam (XANAX) 0.5 MG tablet 1 tab Every 8 hours as needed for anxiety    amoxicillin-clavulanate 250-125mg (AUGMENTIN) 250-125 mg Tab Take 1 tablet by mouth every 12 (twelve) hours.    aspirin (ECOTRIN) 81 MG EC tablet Take 81 mg by mouth every morning.     atorvastatin (LIPITOR) 40 MG tablet TAKE 1 TABLET ONE TIME DAILY FOR CHOLESTEROL    BD INSULIN PEN NEEDLE UF SHORT 31 gauge x 5/16" Ndle USE TO INJECT NOVOLOG FLEXPEN BEFORE MEALS    bisacodyl (DULCOLAX) 10 mg Supp Place 1 suppository (10 mg total) rectally daily as needed.    blood sugar diagnostic Strp To check BG 4  times daily, to use with insurance preferred meter-true metrix    blood-glucose meter kit To check BG 4 times daily, to use with insurance preferred meter-true metrix    buPROPion (WELLBUTRIN XL) 300 MG 24 hr tablet Take 1 tablet (300 mg total) by mouth once daily.    ciprofloxacin HCl (CIPRO) 500 MG tablet Take 1 tablet (500 mg total) by mouth every 12 (twelve) hours.    DULCOLAX, BISACODYL, ORAL Take by mouth as needed (constipation).    ergocalciferol (ERGOCALCIFEROL) 50,000 unit Cap Take 1 capsule (50,000 Units total) by mouth every 7 days.    famotidine (PEPCID) 20 MG tablet Take 1 tablet (20 mg total) by mouth once daily.    fluconazole (DIFLUCAN) 150 MG Tab Take one tablet by mouth once and may retreat in 3 days if still symptomatic    folic acid (FOLVITE) 1 MG tablet Take 1 tablet (1 mg total) by mouth once daily.    furosemide (LASIX) 40 MG tablet Take 1 tablet (40 mg total) by mouth 2 (two) times daily.    furosemide (LASIX) 40 MG tablet TAKE 1 TABLET BY MOUTH ONCE DAILY    glipiZIDE (GLUCOTROL) 2.5 MG TR24 Take 1 tablet (hold if less than 150) at lunch (non dialysis days)    insulin aspart U-100 (NOVOLOG U-100 INSULIN ASPART) 100 unit/mL injection Use correction scale 180-230+1, 231-280+2, 281-330+3, 331-380+4, >380+5, max 15 units.    insulin glargine (LANTUS U-100 INSULIN) 100 " unit/mL injection Inject 10-12 Units into the skin every evening.    lancets Misc 1 lancet by Misc.(Non-Drug; Combo Route) route 2 (two) times daily with meals. Check glucose before meals and bed    lancets Misc To check BG 4 times daily, to use with insurance preferred meter-true metrix    levETIRAcetam (KEPPRA) 500 MG Tab TAKE 1 TABLET BY MOUTH EVERY 8 HOURS    lidocaine-prilocaine (EMLA) cream Apply topically as needed.    polyethylene glycol (MIRALAX) 17 gram/dose powder Take 17 g by mouth 2 (two) times daily.    pregabalin (LYRICA) 25 MG capsule Take 1 capsule (25 mg total) by mouth once daily. May take additional dose after HD. (Patient taking differently: Take 25 mg by mouth daily as needed. May take additional dose after HD.)    sevelamer carbonate (RENVELA) 800 mg Tab Take 800 mg by mouth 3 (three) times daily with meals.    [] terconazole (TERAZOL 3) 0.8 % vaginal cream Place 1 applicator vaginally every evening. for 3 days    traMADol (ULTRAM) 50 mg tablet 1 tab Q6-8 hours as needed for joint pain    VITAMIN D2 50,000 unit capsule TAKE 1 CAPSULE PER G TUBE EVERY 7 DAYS     Family History     Problem Relation (Age of Onset)    Bell's palsy Sister    Blindness Maternal Grandmother    Breast cancer Mother    Diabetes Mother, Son,     Heart attack Mother    Heart disease Mother    Hypertension Mother, Sister    Lupus Sister    No Known Problems Daughter    Sleep apnea Sister    Stroke Sister, Maternal Aunt        Tobacco Use    Smoking status: Never Smoker    Smokeless tobacco: Never Used   Substance and Sexual Activity    Alcohol use: Yes     Comment: occasional wine cooler     Drug use: Never    Sexual activity: Yes     Partners: Male     Birth control/protection: Post-menopausal     Review of Systems   Unable to perform ROS: Mental status change     Objective:     Vital Signs (Most Recent):  Temp: 98.4 °F (36.9 °C) (10/07/19 1125)  Pulse: 82 (10/07/19 1846)  Resp: (!) 22 (10/07/19  1830)  BP: 115/67 (10/07/19 1846)  SpO2: 100 % (10/07/19 1846) Vital Signs (24h Range):  Temp:  [98.4 °F (36.9 °C)] 98.4 °F (36.9 °C)  Pulse:  [79-97] 82  Resp:  [15-23] 22  SpO2:  [96 %-100 %] 100 %  BP: ()/(62-97) 115/67     Weight: 77.6 kg (171 lb)  Body mass index is 30.29 kg/m².    Physical Exam   Constitutional: She is oriented to person, place, and time. She appears well-developed and well-nourished.   Hemiparesis in left side    HENT:   Head: Normocephalic and atraumatic.   Eyes: Pupils are equal, round, and reactive to light. EOM are normal.   Neck: Normal range of motion. Neck supple.   Cardiovascular: Normal rate, regular rhythm and normal heart sounds.   Pulmonary/Chest: Effort normal. No respiratory distress. She has no wheezes. She has rales (at lung bases ).   Abdominal: Soft. Bowel sounds are normal. She exhibits no distension.   Musculoskeletal: Normal range of motion. She exhibits no edema or deformity.   Neurological: She is alert and oriented to person, place, and time.   Skin: Skin is warm and dry.   Psychiatric: She has a normal mood and affect.         CRANIAL NERVES     CN III, IV, VI   Pupils are equal, round, and reactive to light.  Extraocular motions are normal.        Significant Labs:   CBC:   Recent Labs   Lab 10/07/19  1218 10/07/19  1430   WBC 5.86  --    HGB 12.0  --    HCT 38.2 36     --      CMP:   Recent Labs   Lab 10/07/19  1218      K 7.3*   CL 99   CO2 19*   *   *   CREATININE 9.4*   CALCIUM 9.1   PROT 7.3   ALBUMIN 3.6   BILITOT 0.4   ALKPHOS 93   AST 17   ALT 9*   ANIONGAP 20*   EGFRNONAA 4.1*     Lactic Acid: No results for input(s): LACTATE in the last 48 hours.  Troponin:   Recent Labs   Lab 10/07/19  1218   TROPONINI 0.024     All pertinent labs within the past 24 hours have been reviewed.    Significant Imaging: CT: I have reviewed all pertinent results/findings within the past 24 hours and my personal findings are:      1.  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  There is an additional lacunar infarct, remote, involving the left basal ganglia.  No convincing acute intracranial abnormalities or recent major vascular territory infarct.  No findings to suggest acute hemorrhage.  2. Mild sinus disease.    Assessment/Plan:     * Epilepsy without status epilepticus  Patient is a 58 yo female w/ history of epilepsy and other comorbid conditions that are listed above presents to the ED w/ two break-through seizures as noted by the family at bedside despite compliance w/ her Keppra. She is currently lethargic, which is likely the combination of her being post-ictal as well as after receiving 2 mg ativan in the ED.    toxicity 2/2 missed dialysis vs. medication non-compliance vs medication lower keppra threshold    Plan:   - Neurology following  - Keppra level pending  - 1.5 g load of Keppra in the ED  - If remains lethargic and her MS does not improve, can obtain an EEG   - CTH to evaluate for any acute intracranial abnormalities  - Avoid any seizure threshold lowering medication such as Wellbutrin, which was recently increased to 300 mg from 150 mg in the past 2 months, which appears on patient's MAR  - Starts home dose of keppra   - Ativan prn       Hyperkalemia  K6.9 in ED with EKG changes , received dialysis    Plan:   Follow up K         ESRD (end stage renal disease) on dialysis  on HD M,W,F Since August 2018 via R[facility] AVG     Plan:   - RRT emergently in the ER with a three hour session and a 2K bath  - Follow chemistry  - low potassium diet  - Cardiac Monitor  - intake and output  - daily weights        Type 2 diabetes mellitus with chronic kidney disease on chronic dialysis, with long-term current use of insulin  On insulin at home   Plan:   - keep npo for now   - basal insulin of 6 unit + SC   - Accu check       Folic acid  deficiency  - Continue home meds       Anemia in ESRD (end-stage renal disease)  Plan:   - daily CBC       Hyperlipidemia  Continue home Atrovastatin        VTE Risk Mitigation (From admission, onward)         Ordered     heparin (porcine) injection 5,000 Units  Every 8 hours      10/07/19 1648     IP VTE HIGH RISK PATIENT  Once      10/07/19 1648              Kaia Ferraro MD  Internal Medicine Department, PGY-1  [facility]  [phone]

## 2019-10-08 NOTE — HPI
60 yo F with a PMH of  ESRD secondary to HTN and Diabetes nephropathy on HD M,W,F Since August 2018 via R[facility] AVG, CVA s/p aneurysm rupture with residual hemiparesis, seizure disorder on Keppra,CHF (last EF 60-65%, grade 2 diastolic dysfunction), HTN, presents with 2 episodes of seizure from Home.  and daughter provided history since patient is sedated after a dose of Ativan. Patient had a pounding headache last night and woke up with one this morning.  At approximately 8 am, patient had seizure in which she looked to the left, was unresponsive, and convulsive for approximately 2 minutes, afterwards she was in a post-ictal state of confusion for approximately 5 minutes.  Family chose not to report to hospital after that incident as they were on her way to her hemodialysis appointment.  Family reports another seizure-incident like the first one at 9:40 am, at which point they drove to the emergency room.  In the emergency room, patient has another seizure at 1 pm, witnessed by ER doctor after which she was administered Ativan, Potassium was found to be 7.3 with peaked T waves on EKG.     Per family, she has been compliant with her medication, denies any preceding S/S of infection or any exposure to anyone who is ill.   Per daughter, she was diagnosed w/ a UTI 4 weeks ago and after undergoing treatment w/ Abx, she again had positive Cx and was started on Amoxiclav course which she completed on Saturday.    EEG 7/23 -  Abnormal EEG due to the presence of moderately severe diffuse slowing and right hemispheric periodic and intermittent epileptiform discharges w/o overt seizure activity is seen.  Neurology consulted for breakthrough seizures.    At time of interview patient was drowsy after the ativan, most of the history taking from the  and medical records.

## 2019-10-08 NOTE — HOSPITAL COURSE
Patient evaluated by neurology and by hospital medicine in emergency department. Given Ativan for active seizure and Keppra loaded 1500mg IV. Patient then dialyzed for post seizure hyperkalemia. Tolerated HD well and patient returned to baseline mental status overnight 10/7 into 10/8. Triggers for seizure likely due to multiple medications which can lower her seizure threshold and alter hepatic metabolism with recent use of fluconazole and increase of bupropion in a patient with history of seizures. Bupropion discontinued. UA suggestive of UTI but patient asymptomatic and anuric. Diuretics discontinued on discharge medical reconciliation. Keppra dosing adjusted to reflect HD status with 1000mg QD and additional 500mg following dialysis on MWF HD. Neurology arranged for follow up with their clinic and PCP follow up arranged by case management. Patient stable and appropriate for discharge.

## 2019-10-08 NOTE — NURSING
Pt. Arrived on the unit from the ED , alert and oriented x4 accompanied by her , vital signs stable, telemetry sinus rhythm, pt. And spouse oriented to the unit, staff and the current plan of care

## 2019-10-08 NOTE — TELEPHONE ENCOUNTER
----- Message from Salma Desouza sent at 10/8/2019  2:45 PM CDT -----  Contact: pharmacy/ananth/[phone]  Pharmacy called in regards to getting clarification on the pt Rx for     levETIRAcetam (KEPPRA) 500 MG Tab 36 tablet 3 10/9/2019 10/8/2020 Take 1 tablet (500 mg total) by mouth every Mon, Wed, Fri. Monday Wednesday and Friday after DIALYSIS take additional 500 mg     Pt saw the dr in Neurosurgical Stepdown Unit.    [facility] [phone]  Please advise

## 2019-10-08 NOTE — TELEPHONE ENCOUNTER
----- Message from Aura Marquez sent at 10/8/2019 10:47 AM CDT -----  Contact: daughter Reina call [phone]  Hospital want to change her Wellbutrin to something else and they should be sending Dr.St Haynes some notes.

## 2019-10-08 NOTE — TELEPHONE ENCOUNTER
Spoke with daughter, Ms. Mccray was admitted to the hospital due to having multiple seizures. The doctor mentioned that it could be the Wellbutrin causing the seizures. Please call Reina to discuss.

## 2019-10-08 NOTE — ASSESSMENT & PLAN NOTE
on HD M,W,F Since August 2018 via [facility]     Plan:   - Hyperkalemia improved from 7 to 4.4 after dialysis   - resume dialysis on schedule following discharge  - keppra dosing adjusted to reflect HD status, 1000mg QD with additional 500mg following HD on HD days

## 2019-10-08 NOTE — DISCHARGE SUMMARY
[facility] Hospital Medicine  Discharge Summary      Patient Name: Lucy Perez  MRN: [id number]  Admission Date: 10/7/2019  Hospital Length of Stay: 1 days  Discharge Date and Time:  10/08/2019 12:40 PM  Attending Physician: Yady Richardson MD   Discharging Provider: Maciej Harvey MD  Primary Care Provider: Beverly Muniz MD  Hospital Medicine Team: [facility] 5 Maciej Harvey MD    HPI:   58 yo F with a PMH of  ESRD secondary to HTN and Diabetes nephropathy on HD M,W,F Since August 2018 via RUE AVG, CVA s/p aneurysm rupture with residual hemiparesis, seizure disorder on Keppra,CHF (last EF 60-65%, grade 2 diastolic dysfunction), HTN, presents with 2 episodes of seizure from Home.  and daughter provided history since patient is sedated after a dose of Ativan. Patient had a pounding headache last night and woke up with one this morning.  At approximately 8 am, patient had seizure in which she looked to the left, was unresponsive, and convulsive for approximately 2 minutes, afterwards she was in a post-ictal state of confusion for approximately 5 minutes.  Family chose not to report to hospital after that incident as they were on her way to her hemodialysis appointment.  Family reports another seizure-incident like the first one at 9:40 am, at which point they drove to the emergency room.  In the emergency room, patient has another seizure at 1 pm, witnessed by ER doctor after which she was administered Ativan, Potassium was found to be 7.3 with peaked T waves on EKG.     Per family, she has been compliant with her medication, denies any preceding S/S of infection or any exposure to anyone who is ill.   Per daughter, she was diagnosed w/ a UTI 4 weeks ago and after undergoing treatment w/ Abx, she again had positive Cx and was started on Amoxiclav course which she completed on Saturday.    EEG 7/23 -  Abnormal EEG due to the presence of moderately severe diffuse slowing  and right hemispheric periodic and intermittent epileptiform discharges w/o overt seizure activity is seen.  Neurology consulted for breakthrough seizures.    At time of interview patient was drowsy after the ativan, most of the history taking from the  and medical records.     * No surgery found *      Hospital Course:   Patient evaluated by neurology and by hospital medicine in emergency department. Given Ativan for active seizure and Keppra loaded 1500mg IV. Patient then dialyzed for post seizure hyperkalemia. Tolerated HD well and patient returned to baseline mental status overnight 10/7 into 10/8. Triggers for seizure likely due to multiple medications which can lower her seizure threshold and alter hepatic metabolism with recent use of fluconazole and increase of bupropion in a patient with history of seizures. Bupropion discontinued. UA suggestive of UTI but patient asymptomatic and anuric. Diuretics discontinued on discharge medical reconciliation. Keppra dosing adjusted to reflect HD status with 1000mg QD and additional 500mg following dialysis on MWF HD. Neurology arranged for follow up with their clinic and PCP follow up arranged by case management. Patient stable and appropriate for discharge.      Vitals:    10/08/19 1244   BP:    Pulse: 76   Resp: 16   Temp:      Constitutional: She is oriented to person, place, and time. She appears well-developed and well-nourished. Sitting in wheelchair at bedside. Speaking in full sentences on room air. Laughing and conversing with  and team.   HENT:   Head: Normocephalic and atraumatic.   Eyes: Pupils are equal, round, and reactive to light. EOM are normal.   Neck: Normal range of motion. Neck supple.   Cardiovascular: Normal rate, regular rhythm and normal heart sounds.   Pulmonary/Chest: Effort normal. No respiratory distress. She has no wheezes.  Abdominal: Soft. Bowel sounds are normal. She exhibits no distension.   Musculoskeletal: Normal range  of motion. She exhibits no edema or deformity.   Neurological: She is alert and oriented to person, place, and time. Dense left hemiparesis.   Skin: Skin is warm and dry. Fistula RUE  Psychiatric: She has a normal mood and affect.     Consults:   Consults (From admission, onward)        Status Ordering Provider     Inpatient consult to Nephrology  Once     Provider:  (Not yet assigned)    Completed STAN SONI     Inpatient consult to neurology  Once     Provider:  (Not yet assigned)    Completed SHANTEL JACOB          * Epilepsy without status epilepticus  Patient is a 60 yo female w/ history of epilepsy and other comorbid conditions that are listed above presents to the ED w/ two break-through seizures as noted by the family at bedside despite compliance w/ her Keppra. She is currently lethargic, which is likely the combination of her being post-ictal as well as after receiving 2 mg ativan in the ED.    toxicity 2/2 missed dialysis vs. medication non-compliance vs medication lower keppra threshold    Plan:   - Keppra level pending  - modified keppra dose to reflect HD dosing for discharge 1000mg of Keppra QD and 500mg following HD on HD days  - CTH showed no new abnormality   - Avoid any seizure threshold lowering medication such as Wellbutrin, which was recently increased to 300 mg from 150 mg in the past 2 months, which appears on patient's MAR (Wellbutrin discontinued and will need to follow up with PCP for safer medication for anxiety/depression)  - tramadol also discontinued from MAR - although patient not taking - important to remove and not utilize as this can lower seizure threshold       ESRD (end stage renal disease) on dialysis  on HD M,W,F Since August 2018 via RUE AVG     Plan:   - Hyperkalemia improved from 7 to 4.4 after dialysis   - resume dialysis on schedule following discharge  - keppra dosing adjusted to reflect HD status, 1000mg QD with additional 500mg following HD on HD days       Type  2 diabetes mellitus with chronic kidney disease on chronic dialysis, with long-term current use of insulin  On insulin at home - home dosing on discharge      LEFT Hemiplegia as late effect of stroke  - stable finding  - wheelchair bound      Folic acid deficiency  - Continue home meds       Anemia in ESRD (end-stage renal disease)  Plan:   - daily CBC       Mixed anxiety and depressive disorder  - as in epilepsy without status  - patient was on bupropion with increase in dose in last 2 months  - bupropion discontinued as it can lower the seizure threshold and should be avoided in patient with history of epilepsy and known focus of epileptiform discharge  - will need close PCP review of safer medication choice in this patient       Hyperlipidemia  Continue home Atrovastatin      High anion gap metabolic acidosis-resolved as of 10/8/2019        Hyperkalemia-resolved as of 10/8/2019  K 6.9 in ED with EKG changes , received dialysis improved to 4.4     - Resolved             Final Active Diagnoses:    Diagnosis Date Noted POA    PRINCIPAL PROBLEM:  Epilepsy without status epilepticus [G40.909] 06/20/2017 Yes    ESRD (end stage renal disease) on dialysis [N18.6, Z99.2] 08/11/2018 Not Applicable    Type 2 diabetes mellitus with chronic kidney disease on chronic dialysis, with long-term current use of insulin [E11.22, N18.6, Z99.2, Z79.4] 08/10/2018 Not Applicable    LEFT Hemiplegia as late effect of stroke [I69.359] 01/03/2018 Not Applicable    Folic acid deficiency [E53.8] 10/09/2017 Yes    Anemia in ESRD (end-stage renal disease) [N18.6, D63.1] 08/02/2017 Yes    Hyperlipidemia [E78.5] 03/05/2013 Yes    Mixed anxiety and depressive disorder [F41.8] 03/05/2013 Yes      Problems Resolved During this Admission:    Diagnosis Date Noted Date Resolved POA    High anion gap metabolic acidosis [E87.2] 10/07/2019 10/08/2019 Yes    Hyperkalemia [E87.5] 06/21/2019 10/08/2019 Yes       Discharged Condition:  good    Disposition: Home or Self Care    Follow Up:    Patient Instructions:   No discharge procedures on file.    Significant Diagnostic Studies: Labs: All labs within the past 24 hours have been reviewed    Pending Diagnostic Studies:     None         Medications:  Reconciled Home Medications:      Medication List      CHANGE how you take these medications    albuterol 90 mcg/actuation inhaler  Commonly known as:  VENTOLIN HFA  Inhale 2 puffs into the lungs every 6 (six) hours as needed for Wheezing. Rescue  What changed:  Another medication with the same name was removed. Continue taking this medication, and follow the directions you see here.     bisacodyl 10 mg Supp  Commonly known as:  DULCOLAX  Place 1 suppository (10 mg total) rectally daily as needed.  What changed:  Another medication with the same name was removed. Continue taking this medication, and follow the directions you see here.     ergocalciferol 50,000 unit Cap  Commonly known as:  ERGOCALCIFEROL  Take 1 capsule (50,000 Units total) by mouth every 7 days.  What changed:  Another medication with the same name was removed. Continue taking this medication, and follow the directions you see here.     lancets Misc  1 lancet by Misc.(Non-Drug; Combo Route) route 2 (two) times daily with meals. Check glucose before meals and bed  What changed:  Another medication with the same name was removed. Continue taking this medication, and follow the directions you see here.     * levETIRAcetam 1000 MG tablet  Commonly known as:  KEPPRA  Take 1 tablet (1,000 mg total) by mouth once daily.  What changed:    · medication strength  · how much to take  · when to take this     * levETIRAcetam 500 MG Tab  Commonly known as:  KEPPRA  Take 1 tablet (500 mg total) by mouth every Mon, Wed, Fri. Monday Wednesday and Friday after DIALYSIS take additional 500mg  Start taking on:  October 9, 2019  What changed:  You were already taking a medication with the same name, and  "this prescription was added. Make sure you understand how and when to take each.     pregabalin 25 MG capsule  Commonly known as:  LYRICA  Take 1 capsule (25 mg total) by mouth once daily. May take additional dose after HD.  What changed:    · when to take this  · reasons to take this         * This list has 2 medication(s) that are the same as other medications prescribed for you. Read the directions carefully, and ask your doctor or other care provider to review them with you.            CONTINUE taking these medications    acetaminophen 325 MG tablet  Commonly known as:  TYLENOL  Take 2 tablets (650 mg total) by mouth every 6 (six) hours as needed for Pain.     ALPRAZolam 0.5 MG tablet  Commonly known as:  XANAX  1 tab Every 8 hours as needed for anxiety     aspirin 81 MG EC tablet  Commonly known as:  ECOTRIN  Take 81 mg by mouth every morning.     atorvastatin 40 MG tablet  Commonly known as:  LIPITOR  TAKE 1 TABLET ONE TIME DAILY FOR CHOLESTEROL     BD ULTRA-FINE SHORT PEN NEEDLE 31 gauge x 5/16" Ndle  Generic drug:  pen needle, diabetic  USE TO INJECT NOVOLOG FLEXPEN BEFORE MEALS     blood sugar diagnostic Strp  To check BG 4  times daily, to use with insurance preferred meter-true metrix     blood-glucose meter kit  To check BG 4 times daily, to use with insurance preferred meter-true metrix     famotidine 20 MG tablet  Commonly known as:  PEPCID  Take 1 tablet (20 mg total) by mouth once daily.     folic acid 1 MG tablet  Commonly known as:  FOLVITE  Take 1 tablet (1 mg total) by mouth once daily.     glipiZIDE 2.5 MG Tr24  Commonly known as:  GLUCOTROL  Take 1 tablet (hold if less than 150) at lunch (non dialysis days)     insulin aspart U-100 100 unit/mL injection  Commonly known as:  NovoLOG U-100 Insulin aspart  Use correction scale 180-230+1, 231-280+2, 281-330+3, 331-380+4, >380+5, max 15 units.     insulin glargine 100 unit/mL injection  Commonly known as:  LANTUS U-100 INSULIN  Inject 10-12 Units " into the skin every evening.     polyethylene glycol 17 gram/dose powder  Commonly known as:  MIRALAX  Take 17 g by mouth 2 (two) times daily.     sevelamer carbonate 800 mg Tab  Commonly known as:  RENVELA  Take 800 mg by mouth 3 (three) times daily with meals.        STOP taking these medications    amoxicillin-clavulanate 250-125mg 250-125 mg Tab  Commonly known as:  AUGMENTIN     buPROPion 300 MG 24 hr tablet  Commonly known as:  WELLBUTRIN XL     ciprofloxacin HCl 500 MG tablet  Commonly known as:  CIPRO     fluconazole 150 MG Tab  Commonly known as:  DIFLUCAN     furosemide 40 MG tablet  Commonly known as:  LASIX     terconazole 0.8 % vaginal cream  Commonly known as:  TERAZOL 3     traMADol 50 mg tablet  Commonly known as:  ULTRAM        ASK your doctor about these medications    lidocaine-prilocaine cream  Commonly known as:  EMLA  Apply topically as needed.            Indwelling Lines/Drains at time of discharge:   Lines/Drains/Airways     Central Venous Catheter Line                 Percutaneous Central Line Insertion/Assessment - triple lumen  07/06/19 2035 93 days          Drain                 Hemodialysis AV Graft Right upper arm -- days          Pressure Ulcer                 Pressure Injury 07/05/19 Left Buttocks Stage 2 95 days         Pressure Injury 07/05/19 1600 Right Buttocks Stage 2 94 days                Time spent on the discharge of patient: 35 minutes  Patient was seen and examined on the date of discharge and determined to be suitable for discharge.         Maciej Harvey MD  Department of Hospital Medicine  [facility]

## 2019-10-08 NOTE — SUBJECTIVE & OBJECTIVE
Subjective:     Interval History: Patient has returned to baseline, awake and alert and pleasantly conversant. Denies any pain or any other symptoms as well as any seizures overnight.    Current Neurological Medications:   Keppra 500 mg TID   Folic acid  1mg QD    Current Facility-Administered Medications   Medication Dose Route Frequency Provider Last Rate Last Dose    albuterol sulfate nebulizer solution 2.5 mg  2.5 mg Nebulization Q4H PRN Maciej Harvey MD        aspirin EC tablet 81 mg  81 mg Oral QAM Kaia Ferraro MD   81 mg at 10/08/19 0541    atorvastatin tablet 40 mg  40 mg Oral Daily Kaia Ferraro MD   40 mg at 10/08/19 0746    dextrose 10% (D10W) Bolus  12.5 g Intravenous PRN Kaia Ferraro MD        famotidine tablet 20 mg  20 mg Oral Daily Kaia Ferraro MD   20 mg at 10/08/19 0745    folic acid tablet 1 mg  1 mg Oral Daily Kaia Ferraro MD   1 mg at 10/08/19 0744    glucagon (human recombinant) injection 1 mg  1 mg Intramuscular PRN Kaia Ferraro MD        heparin (porcine) injection 5,000 Units  5,000 Units Subcutaneous Q8H Kaia Ferraro MD   5,000 Units at 10/08/19 0541    insulin aspart U-100 pen 0-5 Units  0-5 Units Subcutaneous Q6H PRN Kaia Ferraro MD        insulin detemir U-100 pen 6 Units  6 Units Subcutaneous QHS Kaia Ferraro MD   6 Units at 10/07/19 2224    levETIRAcetam tablet 500 mg  500 mg Oral Q8H Kaia Ferraro MD        polyethylene glycol packet 17 g  17 g Oral Once Kaia Ferraro MD   Stopped at 10/07/19 1715    sevelamer carbonate tablet 800 mg  800 mg Oral TID WM Kaia Ferraro MD   800 mg at 10/08/19 0745    sodium chloride 0.9% flush 10 mL  10 mL Intravenous PRN Kaia Ferraro MD           Review of Systems   Constitutional: Negative.    HENT: Negative.    Eyes: Negative.    Respiratory: Negative.    Cardiovascular: Negative.    Gastrointestinal: Negative.    Genitourinary: Negative.    Neurological: Positive for seizures, facial asymmetry and weakness.   Hematological: Negative.     Psychiatric/Behavioral: Negative.      Objective:     Vital Signs (Most Recent):  Temp: 97.5 °F (36.4 °C) (10/08/19 0741)  Pulse: 73 (10/08/19 0741)  Resp: 18 (10/08/19 0741)  BP: (!) 129/58 (10/08/19 0741)  SpO2: (!) 92 % (10/08/19 0741) Vital Signs (24h Range):  Temp:  [97 °F (36.1 °C)-98.4 °F (36.9 °C)] 97.5 °F (36.4 °C)  Pulse:  [65-97] 73  Resp:  [15-23] 18  SpO2:  [92 %-100 %] 92 %  BP: ()/(53-99) 129/58     Weight: 77.6 kg (171 lb 1.2 oz)  Body mass index is 30.3 kg/m².    Physical Exam  General:  Well-developed, well-nourished, awake/alert w/ NAD  HEENT:  NCAT, PERRLA  Neck:  Supple, normal ROM without nuchal rigidity     Neurologic Exam:  Mental Status:  AAOx3.  Speech, thought content appropriate.  Recent, remote recall 3/3.  Cranial Nerves:  VFs intact on counting fingers in all quadrants bilaterally.  PERRLA, EOMI.  L facial weakness.  Palate raises symmetrically, tongue protrudes midline.    Motor:  Normal bulk and tone on R, increased tone on L.  RUE shoulder abduction 4/5, biceps/triceps4/5,  strength 5/5.  RLE hip flexors 3/5, knee flexion/extension 4-/5, plantarflexion/dorsiflexion 4/5.  Contracted on the LUE, withdraws to painful stimuli in UE and LE on L   Sensory:  Intact to light touch at all extremities and face without inattention.    Reflexes:  Biceps, brachioradialis 1+ on R and 3+ on L.  Coordination:  FNF is intact with no dysmetria on the R.  No resting tremor.  Gait:  Wheelchair-bound       Significant Labs:   Hemoglobin A1c: No results for input(s): HGBA1C in the last 720 hours.  Blood Culture: No results for input(s): LABBLOO in the last 48 hours.  BMP:   Recent Labs   Lab 10/07/19  1218 10/07/19  2124 10/08/19  0558   * 123* 81    138 138   K 7.3* 4.4 5.2*   CL 99 98 99   CO2 19* 29 26   * 43* 48*   CREATININE 9.4* 5.3* 5.9*   CALCIUM 9.1 9.0 9.3   MG  --   --  2.5     CBC:   Recent Labs   Lab 10/07/19  1218 10/07/19  1430 10/08/19  0558   WBC 5.86  --   4.18   HGB 12.0  --  11.8*   HCT 38.2 36 39.1     --  180     CMP:   Recent Labs   Lab 10/07/19  1218 10/07/19  2124 10/08/19  0558   * 123* 81    138 138   K 7.3* 4.4 5.2*   CL 99 98 99   CO2 19* 29 26   * 43* 48*   CREATININE 9.4* 5.3* 5.9*   CALCIUM 9.1 9.0 9.3   MG  --   --  2.5   PROT 7.3  --   --    ALBUMIN 3.6 3.4*  --    BILITOT 0.4  --   --    ALKPHOS 93  --   --    AST 17  --   --    ALT 9*  --   --    ANIONGAP 20* 11 13   EGFRNONAA 4.1* 8.1* 7.1*     CSF Culture: No results for input(s): CSFCULTURE in the last 48 hours.  CSF Studies: No results for input(s): ALIQUT, APPEARCSF, COLORCSF, CSFWBC, CSFRBC, GLUCCSF, LDHCSF, PROTEINCSF, VDRLCSF in the last 48 hours.  Inflammatory Markers: No results for input(s): SEDRATE, CRP, PROCAL in the last 48 hours.  POCT Glucose:   Recent Labs   Lab 10/07/19  1710 10/07/19  2223 10/08/19  0735   POCTGLUCOSE 107 140* 70     Prealbumin: No results for input(s): PREALBUMIN in the last 48 hours.  Respiratory Culture: No results for input(s): GSRESP, RESPIRATORYC in the last 48 hours.  Urine Culture: No results for input(s): LABURIN in the last 48 hours.  Urine Studies:   Recent Labs   Lab 10/07/19  1231   COLORU Yellow   APPEARANCEUA Clear   PHUR 8.0   SPECGRAV 1.010   PROTEINUA 2+*   GLUCUA 3+*   KETONESU Negative   BILIRUBINUA Negative   OCCULTUA 1+*   NITRITE Negative   LEUKOCYTESUR 2+*   RBCUA 3   WBCUA 8*   BACTERIA Few*   SQUAMEPITHEL 3   HYALINECASTS 1     All pertinent lab results from the past 24 hours have been reviewed.    Significant Imaging: I have reviewed all pertinent imaging results/findings within the past 24 hours.   [facility] (10/08/2019)    1. 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  There is an additional lacunar infarct, remote, involving the left basal ganglia.  No convincing acute intracranial abnormalities or recent  major vascular territory infarct.  No findings to suggest acute hemorrhage.  2. Mild sinus disease.

## 2019-10-08 NOTE — SUBJECTIVE & OBJECTIVE
Interval History: No acute event overnight.     Review of Systems   Constitutional: Negative for chills and fever.   HENT: Negative for congestion.    Respiratory: Negative for cough, shortness of breath and wheezing.    Cardiovascular: Negative for chest pain, palpitations and leg swelling.   Gastrointestinal: Negative for abdominal distention, abdominal pain, constipation and diarrhea.   Genitourinary: Negative for dysuria.        Patient making urine every other day once    Musculoskeletal: Negative for back pain.   Neurological: Negative for dizziness and headaches.     Objective:     Vital Signs (Most Recent):  Temp: 97.6 °F (36.4 °C) (10/08/19 0505)  Pulse: 73 (10/08/19 0505)  Resp: 16 (10/08/19 0505)  BP: (!) 112/58 (10/08/19 0505)  SpO2: (!) 94 % (10/08/19 0505) Vital Signs (24h Range):  Temp:  [97 °F (36.1 °C)-98.4 °F (36.9 °C)] 97.6 °F (36.4 °C)  Pulse:  [65-97] 73  Resp:  [15-23] 16  SpO2:  [94 %-100 %] 94 %  BP: ()/(53-99) 112/58     Weight: 77.6 kg (171 lb 1.2 oz)  Body mass index is 30.3 kg/m².    Intake/Output Summary (Last 24 hours) at 10/8/2019 0710  Last data filed at 10/7/2019 1830  Gross per 24 hour   Intake 100 ml   Output 2100 ml   Net -2000 ml      Physical Exam   Constitutional: She is oriented to person, place, and time. She appears well-developed and well-nourished.   Patient sleeping in bed, left hemiparesis.    HENT:   Head: Normocephalic and atraumatic.   Eyes: EOM are normal.   Neck: Normal range of motion. Neck supple.   Cardiovascular: Normal rate, regular rhythm and normal heart sounds.   Pulmonary/Chest: Effort normal and breath sounds normal. No stridor. No respiratory distress. She has no wheezes. She has no rales.   Abdominal: Soft. Bowel sounds are normal.   Musculoskeletal: Normal range of motion. She exhibits no edema or deformity.   Neurological: She is alert and oriented to person, place, and time.   Skin: Skin is warm and dry.   Psychiatric: She has a normal mood and  affect.       Significant Labs:   BMP:   Recent Labs   Lab 10/08/19  0558   GLU 81      K 5.2*   CL 99   CO2 26   BUN 48*   CREATININE 5.9*   CALCIUM 9.3   MG 2.5       Significant Imaging: I have reviewed and interpreted all pertinent imaging results/findings within the past 24 hours.

## 2019-10-08 NOTE — ASSESSMENT & PLAN NOTE
Patient is a 60 yo female w/ history of epilepsy and other comorbid conditions that are listed above presents to the ED w/ two break-through seizures as noted by the family at bedside despite compliance w/ her Keppra. She is currently lethargic, which is likely the combination of her being post-ictal as well as after receiving 2 mg ativan in the ED. Currently concerned for break through seizures in the setting of infection as she continues to have UA concerning for a UTI ( 2+ leukocytes, 8 WBC and few bacteria noted on today's labs) vs. toxicity 2/2 missed dialysis vs. medication non-compliance     - Keppra level pending  - 1.5 g load of Keppra in the ED s/p level labs  - Would recommend alternative Keppra dosing at this time to fit w/ patient's renal HD status   - 1000 mg QD of Keppra    - Additional 500 mg post dialysis on HD days only   - Infectious work-up and treatment of UTI as per primary team   - Deferred as per primary team as patient is asymptomatic   - HD per Nephrology to improve electrolyte levels    - Completed on 10/07  - Previous EEG w/ known focus as described above, would hold on on further EEG studies at this time   - Patient improved back to baseline  - CTH w/ stable encephalomalacia involving the R MCA territory; no acute intracranial process  - Avoid any seizure threshold lowering medication such as Wellbutrin, which was recently increased to 300 mg from 150 mg in the past 2 months, which appears on patient's MAR   - Patient does have a history of depression that required treatment, so would be prudent to start an anti-depressant to replace the Wellbutrin   - Unlikely to be a Benzo withdrawal seizure as patient has not taken her medication for a while, and only takes it PRN  - Patient should follow up in General Neurology clinic within 6 weeks of discharge for medication management and to establish care   - No further recommendations at this time, appreciate consult, will follow up with  patients

## 2019-10-09 ENCOUNTER — TELEPHONE (OUTPATIENT)
Dept: HEPATOLOGY | Facility: CLINIC | Age: 59
End: 2019-10-09

## 2019-10-09 ENCOUNTER — TELEPHONE (OUTPATIENT)
Dept: INTERNAL MEDICINE | Facility: CLINIC | Age: 59
End: 2019-10-09

## 2019-10-09 LAB — LEVETIRACETAM SERPL-MCNC: 49.8 UG/ML (ref 3–60)

## 2019-10-09 RX ORDER — FLUOXETINE 10 MG/1
10 TABLET ORAL DAILY
Qty: 30 TABLET | Refills: 4 | Status: SHIPPED | OUTPATIENT
Start: 2019-10-09 | End: 2019-12-23

## 2019-10-09 NOTE — TELEPHONE ENCOUNTER
Pt D/C'd today s/p admit for seizures.  Meds D/C'd include: Wellbutrin,Tramadol, and Lasix due to concerns about medication induced decrease seizure threshold. I have recommended start of fluoxetine 10mg daily in it's place given her hx of anxiety/depression. Spoke with  Reina and will erx in Fluoxetine.  Isaac, please mail to Reina a copy of all her Mom's clinic appointments.  Thanks

## 2019-10-09 NOTE — TELEPHONE ENCOUNTER
----- Message from Stephanie Pereira LPN sent at 10/8/2019  3:09 PM CDT -----  Contact: pharmacy/ananth/[phone]      ----- Message -----  From: Salma Desouza  Sent: 10/8/2019   2:45 PM CDT  To: Wes Wing Staff    Pharmacy called in regards to getting clarification on the pt Rx for     levETIRAcetam (KEPPRA) 500 MG Tab 36 tablet 3 10/9/2019 10/8/2020 Take 1 tablet (500 mg total) by mouth every Mon, Wed, Fri. Monday Wednesday and Friday after DIALYSIS take additional 500 mg     Pt saw the dr in Neurosurgical Stepdown Unit.    [facility] [phone]  Please advise

## 2019-10-09 NOTE — TELEPHONE ENCOUNTER
----- Message from Cayla Gomez sent at 10/9/2019 12:14 PM CDT -----  Contact: Ra with [facility]   Pharmacy is calling to clarify an RX.  RX name:  FLUoxetine 10 MG Tab  What do they need to clarify:  Not covered by insurance; would like to change from tablet to the capsule  Comments:

## 2019-10-10 ENCOUNTER — PATIENT OUTREACH (OUTPATIENT)
Dept: ADMINISTRATIVE | Facility: CLINIC | Age: 59
End: 2019-10-10

## 2019-10-10 NOTE — TELEPHONE ENCOUNTER
C3 nurse attempted to contact patient. No answer. The following message was left for the patient to return the call:  Good afternoon  I am a nurse calling on behalf of [facility] from the [facility].  This is a Transitional Care Call for Lucy Perez. When you have a moment please contact us at [phone] or [phone] Monday through Friday, between the hours of 8 am to 4 pm. We look forward to speaking with you. On behalf of [facility] have a nice day.    The patient has a scheduled [facility] appointment with MARISELA Aguilera on 10/21/19 @ 9AM

## 2019-10-11 ENCOUNTER — TELEPHONE (OUTPATIENT)
Dept: INTERNAL MEDICINE | Facility: CLINIC | Age: 59
End: 2019-10-11

## 2019-10-11 NOTE — TELEPHONE ENCOUNTER
----- Message from MARISELA Meléndez sent at 10/10/2019  7:49 PM CDT -----  Sorry, reschedule her from 11 am to an afternoon hospital follow up time-slot...    Thanks

## 2019-10-16 ENCOUNTER — TELEPHONE (OUTPATIENT)
Dept: INTERNAL MEDICINE | Facility: CLINIC | Age: 59
End: 2019-10-16

## 2019-10-16 ENCOUNTER — TELEPHONE (OUTPATIENT)
Dept: NEPHROLOGY | Facility: CLINIC | Age: 59
End: 2019-10-16

## 2019-10-17 ENCOUNTER — PATIENT OUTREACH (OUTPATIENT)
Dept: ADMINISTRATIVE | Facility: OTHER | Age: 59
End: 2019-10-17

## 2019-10-22 ENCOUNTER — OFFICE VISIT (OUTPATIENT)
Dept: UROLOGY | Facility: CLINIC | Age: 59
End: 2019-10-22
Payer: MEDICARE

## 2019-10-22 ENCOUNTER — TELEPHONE (OUTPATIENT)
Dept: NEUROLOGY | Facility: CLINIC | Age: 59
End: 2019-10-22

## 2019-10-22 VITALS — BODY MASS INDEX: 30.3 KG/M2 | HEIGHT: 63 IN

## 2019-10-22 DIAGNOSIS — N39.0 BACTERIAL UTI: Primary | ICD-10-CM

## 2019-10-22 DIAGNOSIS — A49.9 BACTERIAL UTI: Primary | ICD-10-CM

## 2019-10-22 PROCEDURE — 3008F PR BODY MASS INDEX (BMI) DOCUMENTED: ICD-10-PCS | Mod: HCNC,CPTII,NTX,S$GLB | Performed by: PHYSICIAN ASSISTANT

## 2019-10-22 PROCEDURE — 99999 PR PBB SHADOW E&M-EST. PATIENT-LVL IV: CPT | Mod: PBBFAC,HCNC,TXP, | Performed by: PHYSICIAN ASSISTANT

## 2019-10-22 PROCEDURE — 3008F BODY MASS INDEX DOCD: CPT | Mod: HCNC,CPTII,NTX,S$GLB | Performed by: PHYSICIAN ASSISTANT

## 2019-10-22 PROCEDURE — 99999 PR PBB SHADOW E&M-EST. PATIENT-LVL IV: ICD-10-PCS | Mod: PBBFAC,HCNC,TXP, | Performed by: PHYSICIAN ASSISTANT

## 2019-10-22 PROCEDURE — 99203 OFFICE O/P NEW LOW 30 MIN: CPT | Mod: HCNC,NTX,S$GLB, | Performed by: PHYSICIAN ASSISTANT

## 2019-10-22 PROCEDURE — 99203 PR OFFICE/OUTPT VISIT, NEW, LEVL III, 30-44 MIN: ICD-10-PCS | Mod: HCNC,NTX,S$GLB, | Performed by: PHYSICIAN ASSISTANT

## 2019-10-22 NOTE — PROGRESS NOTES
CHIEF COMPLAINT:    Mrs. Perez is a 59 y.o. female presenting for UTI  and gross hematuria follow up.  PRESENTING ILLNESS:    Lucy Perez is a 59 y.o. female with a PMH of ESRD on hemodialysis, HTN, DM, epilepsy who presents for UTI and gross hematuria.    She reports having a UTI and treated with cipro.   8/29/19 culture +E coli ESBL.  She states that the infection did not clear up so she was prescribed augmentin. Repeat culture 9/17 was negative.  She states she was seen in the ED on 10/7 for seizures.  She has a history of seizures but states it was controlled with keppra. She was admitted for further eval.  However, she states that either the augmentin or the increase in wellbutrin caused her seizure.  Wellbutrin was discontinued.   She denies any urinary complaints today.  She is on dialysis MWF.  She still produces a little urine.    She also reports gross hematuria in June 2019.  She hasnt had gross hematuria since.  She reports experiencing gross hematuria after a procedure to fix her clogged graft.   Per note on 6/22/19, straight cath 150 dark tea color urine sent to lab. U/a showed red cloudy urine, + protein, + glucose, + blood.   Microscopic Urinalysis came back showing 0 RBC.      CT RSS 6/21/19: Bilateral kidneys are stable in size, shape and location.  No radiodense calculus seen within the collecting systems on either side or urinary bladder.  No hydronephrosis.  There is bilateral mild nonspecific perinephric stranding similar to prior.  The urinary bladder is within normal limits.       CT abd/pel w/ 7/5/19: Bilateral kidneys are mildly atrophic with cortical thinning, consistent with medical renal disease.  There are no stones visualized in the collecting systems, but there is a punctate calcification seen along the course of the mid right ureter that is favored to represent a ovarian vein calcification (axial series 2, image 62).  No hydronephrosis.  No definite ureteral stones  or nephrolithiasis.  Bladder: No evidence of wall thickening.       Cultures:  Component      Latest Ref Rng & Units 9/17/2019 8/29/2019 8/25/2018   Urine Culture, Routine       No growth ESCHERICHIA COLI ESBL (A) . . . No growth     Component      Latest Ref Rng & Units 6/7/2017   Urine Culture, Routine       ENTEROBACTER CLOACAE COMPLEX . . .    10/3/19 vaginosis screen + for yeast    REVIEW OF SYSTEMS:    Constitutional: Negative for fever and chills.   HENT: Negative for hearing loss.   Eyes: Positive for visual disturbance. (has cataract)  Respiratory: Negative for shortness of breath.   Cardiovascular: Negative for chest pain.   Gastrointestinal:  Positive for constipation (takes miralax and suppository) Negative for vomiting.   Genitourinary:  See HPI  Neurological: Negative for dizziness.   Hematological: Does not bruise/bleed easily.   Psychiatric/Behavioral: Negative for confusion.     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    Cortical cataract of both eyes 7/26/2016    Decubitus ulcer of buttock, stage 2     Degeneration of lumbar or lumbosacral intervertebral disc 3/5/2013    S/p MRI L-spine 5/2009     Depression     Encounter for blood transfusion     ESRD on hemodialysis     Started August 2018    General anesthetics causing adverse effect in therapeutic use     Hemorrhagic cerebrovascular accident (CVA)     8/2016 s/p Hemicraniotomy at [facility] with Left hemiparesis    History of stroke 6/28/2017    s/p R-MCA stroke with R-putaminal hemorrhagic transformation in 8/2016 and 11/2016 (s/p hemicraniotomy at [facility]) with residual L hemiparesis, on AED s/p  CVA      Hypertensive retinopathy of both eyes 7/26/2016    Impingement syndrome of right shoulder 7/2/2014    Obesity     THERESA (obstructive sleep apnea) 3/5/2013    No Home CPAP 2ndary to cost     Partial symptomatic epilepsy with complex partial seizures, not intractable, without status epilepticus     Rheumatoid arthritis(714.0)     Rotator cuff tear 7/2/2014    Sarcoidosis     Stroke 2016    left sided flaccidity, SAH    Vertebral artery stenosis 3/5/2013    S/p Stenting per Dr Burnett        Past Surgical History:   Procedure Laterality Date    BREAST SURGERY      breast reduction    COLONOSCOPY N/A 8/11/2016    Procedure: COLONOSCOPY;  Surgeon: Jerry Vilchis MD;  Location: [facility] ENDO (4TH FLR);  Service: Endoscopy;  Laterality: N/A;  Patient reports difficulty awaking from anesthesia in the past.    DECLOTTING OF VASCULAR GRAFT Right 6/20/2019    Procedure: DECLOT-GRAFT;  Surgeon: Cabrera Irwin MD;  Location: [facility] CATH LAB;  Service: Cardiology;  Laterality: Right;    FISTULOGRAM Right 2/11/2019    Procedure: Fistulogram;  Surgeon: Meir Valencia MD;  Location: [facility] CATH LAB;  Service: Peripheral Vascular;  Laterality: Right;    FISTULOGRAM Right 7/8/2019    Procedure: Fistulogram;  Surgeon: WELLINGTON Palm III, MD;  Location: [facility] CATH LAB;  Service: Peripheral Vascular;  Laterality: Right;    HYSTERECTOMY  1999    JOSE LUIS/BSO (AUB)    PLACEMENT OF ARTERIOVENOUS GRAFT Right 10/18/2018    Procedure: AV GRAFT CREATION;  Surgeon: Meir Valencia MD;  Location: [facility] OR 2ND FLR;  Service: Cardiovascular;  Laterality: Right;    ROTATOR CUFF REPAIR Right July 9, 2014    right side    Skull surgery      Aneurysm    stent placed      in vertebral artery    TOTAL REDUCTION MAMMOPLASTY      TUBAL LIGATION         Family History   Problem Relation Age of Onset    Diabetes Mother     Hypertension Mother     Heart disease Mother     Heart attack Mother     Breast cancer Mother     Stroke  "Sister     Hypertension Sister     Sleep apnea Sister     No Known Problems Daughter     Diabetes Son     Bell's palsy Sister     Lupus Sister     Blindness Maternal Grandmother     Diabetes Unknown         "My entire family family has diabetes"    Stroke Maternal Aunt     Colon cancer Neg Hx     Ovarian cancer Neg Hx        Social History     Socioeconomic History    Marital status:      Spouse name: Not on file    Number of children: Not on file    Years of education: Not on file    Highest education level: Not on file   Occupational History    Not on file   Social Needs    Financial resource strain: Not on file    Food insecurity:     Worry: Not on file     Inability: Not on file    Transportation needs:     Medical: Not on file     Non-medical: Not on file   Tobacco Use    Smoking status: Never Smoker    Smokeless tobacco: Never Used   Substance and Sexual Activity    Alcohol use: Yes     Comment: occasional wine cooler     Drug use: Never    Sexual activity: Yes     Partners: Male     Birth control/protection: Post-menopausal   Lifestyle    Physical activity:     Days per week: Not on file     Minutes per session: Not on file    Stress: Not on file   Relationships    Social connections:     Talks on phone: Not on file     Gets together: Not on file     Attends Religion service: Not on file     Active member of club or organization: Not on file     Attends meetings of clubs or organizations: Not on file     Relationship status: Not on file   Other Topics Concern    Not on file   Social History Narrative    . 2 children(Wilder and Reina). Does not work. Previously worked as a .        Allergies:  Lisinopril and Vicodin [hydrocodone-acetaminophen]    Medications:    Current Outpatient Medications:     acetaminophen (TYLENOL) 325 MG tablet, Take 2 tablets (650 mg total) by mouth every 6 (six) hours as needed for Pain., Disp: , Rfl: 0    albuterol " "(VENTOLIN HFA) 90 mcg/actuation inhaler, Inhale 2 puffs into the lungs every 6 (six) hours as needed for Wheezing. Rescue, Disp: 18 g, Rfl: 0    ALPRAZolam (XANAX) 0.5 MG tablet, 1 tab Every 8 hours as needed for anxiety, Disp: 30 tablet, Rfl: 0    aspirin (ECOTRIN) 81 MG EC tablet, Take 81 mg by mouth every morning. , Disp: , Rfl:     atorvastatin (LIPITOR) 40 MG tablet, TAKE 1 TABLET ONE TIME DAILY FOR CHOLESTEROL, Disp: 90 tablet, Rfl: 2    BD INSULIN PEN NEEDLE UF SHORT 31 gauge x 5/16" Ndle, USE TO INJECT NOVOLOG FLEXPEN BEFORE MEALS, Disp: 150 each, Rfl: 11    bisacodyl (DULCOLAX) 10 mg Supp, Place 1 suppository (10 mg total) rectally daily as needed., Disp: 30 suppository, Rfl: 3    blood sugar diagnostic Strp, To check BG 4  times daily, to use with insurance preferred meter-true metrix, Disp: 400 each, Rfl: 3    blood-glucose meter kit, To check BG 4 times daily, to use with insurance preferred meter-true metrix, Disp: 1 each, Rfl: 1    ergocalciferol (ERGOCALCIFEROL) 50,000 unit Cap, Take 1 capsule (50,000 Units total) by mouth every 7 days., Disp: , Rfl:     famotidine (PEPCID) 20 MG tablet, Take 1 tablet (20 mg total) by mouth once daily., Disp: 90 tablet, Rfl: 2    FLUoxetine 10 MG Tab, Take 1 tablet (10 mg total) by mouth once daily., Disp: 30 tablet, Rfl: 4    folic acid (FOLVITE) 1 MG tablet, Take 1 tablet (1 mg total) by mouth once daily., Disp: 90 tablet, Rfl: 2    glipiZIDE (GLUCOTROL) 2.5 MG TR24, Take 1 tablet (hold if less than 150) at lunch (non dialysis days), Disp: 30 tablet, Rfl: 4    insulin aspart U-100 (NOVOLOG U-100 INSULIN ASPART) 100 unit/mL injection, Use correction scale 180-230+1, 231-280+2, 281-330+3, 331-380+4, >380+5, max 15 units., Disp: 3 vial, Rfl: 3    insulin glargine (LANTUS U-100 INSULIN) 100 unit/mL injection, Inject 10-12 Units into the skin every evening., Disp: 6 mL, Rfl: 6    lancets Misc, 1 lancet by Misc.(Non-Drug; Combo Route) route 2 (two) times " daily with meals. Check glucose before meals and bed, Disp: 300 each, Rfl: 4    levETIRAcetam (KEPPRA) 1000 MG tablet, Take 1 tablet (1,000 mg total) by mouth once daily., Disp: 90 tablet, Rfl: 3    levETIRAcetam (KEPPRA) 500 MG Tab, Take 1 tablet (500 mg total) by mouth every Mon, Wed, Fri. Monday Wednesday and Friday after DIALYSIS take additional 500mg, Disp: 36 tablet, Rfl: 3    polyethylene glycol (MIRALAX) 17 gram/dose powder, Take 17 g by mouth 2 (two) times daily., Disp: 1 Bottle, Rfl: 6    pregabalin (LYRICA) 25 MG capsule, Take 1 capsule (25 mg total) by mouth once daily. May take additional dose after HD. (Patient taking differently: Take 25 mg by mouth daily as needed. May take additional dose after HD.), Disp: 90 capsule, Rfl: 4    sevelamer carbonate (RENVELA) 800 mg Tab, Take 800 mg by mouth 3 (three) times daily with meals., Disp: , Rfl:     PHYSICAL EXAMINATION:    Constitutional: She appears well-developed and well-nourished.  She is in no apparent distress.    Eyes: No scleral icterus noted bilaterally. No discharge bilaterally.    Nose: No rhinorrhea    Cardiovascular: Normal rate.  No pitting edema noted in lower extremities bilaterally    Pulmonary/Chest: Effort normal. No respiratory distress.     Abdominal:  She exhibits no distension.  There is no CVA tenderness.     Lymphadenopathy:          Right: No supraclavicular adenopathy present.        Left: No supraclavicular adenopathy present.     Neurological: She is alert and oriented to person, place, and time.     Skin: Skin is warm and dry.     Psych: Cooperative with normal affect.    Physical Exam      LABS:      IMPRESSION:    Encounter Diagnoses   Name Primary?    Bacterial UTI- resolved Yes       PLAN:  Patient with no urinary complaints today.  Discussed recommendations of not screening for UTI in asymptomatic patients.   Reviewed u/a in June when hospitalized for gross hematuria.  No RBC seen on microscopic u/a.  Discussed  findings with patient. Red urine not due to blood given no RBC seen on microscopic u/a. No hematuria work up needed.    Follow up as needed    Margarette Galaviz PA-C

## 2019-10-22 NOTE — LETTER
October 22, 2019      Beverly Muniz MD  [address]  [facility] [address]           [facility] - Urology 4th Floor  [address]  [facility] [address]  Phone: [phone]          Patient: Lucy Perez   MR Number: [id number]   YOB: 1960   Date of Visit: 10/22/2019       Dear Dr. Beverly Muniz:    Thank you for referring Lucy Perez to me for evaluation. Attached you will find relevant portions of my assessment and plan of care.    If you have questions, please do not hesitate to call me. I look forward to following Lucy Perez along with you.    Sincerely,    Margarette Galaviz PA-C    Enclosure  CC:  No Recipients    If you would like to receive this communication electronically, please contact [email or web address] or [phone] to request more information on [employer] Link access.    For providers and/or their staff who would like to refer a patient to [facility], please contact us through our one-stop-shop provider referral line, [facility], at [phone].    If you feel you have received this communication in error or would no longer like to receive these types of communications, please e-mail [email or web address]

## 2019-10-24 ENCOUNTER — OFFICE VISIT (OUTPATIENT)
Dept: INTERNAL MEDICINE | Facility: CLINIC | Age: 59
End: 2019-10-24
Payer: MEDICARE

## 2019-10-24 VITALS
WEIGHT: 171 LBS | DIASTOLIC BLOOD PRESSURE: 68 MMHG | OXYGEN SATURATION: 93 % | BODY MASS INDEX: 30.3 KG/M2 | HEIGHT: 63 IN | SYSTOLIC BLOOD PRESSURE: 100 MMHG | HEART RATE: 84 BPM

## 2019-10-24 DIAGNOSIS — Z99.2 ESRD (END STAGE RENAL DISEASE) ON DIALYSIS: ICD-10-CM

## 2019-10-24 DIAGNOSIS — Z99.2 TYPE 2 DIABETES MELLITUS WITH CHRONIC KIDNEY DISEASE ON CHRONIC DIALYSIS, WITH LONG-TERM CURRENT USE OF INSULIN: ICD-10-CM

## 2019-10-24 DIAGNOSIS — N18.6 TYPE 2 DIABETES MELLITUS WITH CHRONIC KIDNEY DISEASE ON CHRONIC DIALYSIS, WITH LONG-TERM CURRENT USE OF INSULIN: ICD-10-CM

## 2019-10-24 DIAGNOSIS — Z09 HOSPITAL DISCHARGE FOLLOW-UP: Primary | ICD-10-CM

## 2019-10-24 DIAGNOSIS — E11.22 TYPE 2 DIABETES MELLITUS WITH CHRONIC KIDNEY DISEASE ON CHRONIC DIALYSIS, WITH LONG-TERM CURRENT USE OF INSULIN: ICD-10-CM

## 2019-10-24 DIAGNOSIS — I50.32 CHRONIC DIASTOLIC CHF (CONGESTIVE HEART FAILURE): ICD-10-CM

## 2019-10-24 DIAGNOSIS — E78.5 HYPERLIPIDEMIA, UNSPECIFIED HYPERLIPIDEMIA TYPE: ICD-10-CM

## 2019-10-24 DIAGNOSIS — N18.6 ESRD (END STAGE RENAL DISEASE) ON DIALYSIS: ICD-10-CM

## 2019-10-24 DIAGNOSIS — G40.209 PARTIAL SYMPTOMATIC EPILEPSY WITH COMPLEX PARTIAL SEIZURES, NOT INTRACTABLE, WITHOUT STATUS EPILEPTICUS: ICD-10-CM

## 2019-10-24 DIAGNOSIS — G40.802 OTHER EPILEPSY WITHOUT STATUS EPILEPTICUS, NOT INTRACTABLE: ICD-10-CM

## 2019-10-24 DIAGNOSIS — Z79.4 TYPE 2 DIABETES MELLITUS WITH CHRONIC KIDNEY DISEASE ON CHRONIC DIALYSIS, WITH LONG-TERM CURRENT USE OF INSULIN: ICD-10-CM

## 2019-10-24 DIAGNOSIS — F41.8 MIXED ANXIETY AND DEPRESSIVE DISORDER: ICD-10-CM

## 2019-10-24 DIAGNOSIS — I10 ESSENTIAL HYPERTENSION: ICD-10-CM

## 2019-10-24 PROCEDURE — 99215 PR OFFICE/OUTPT VISIT, EST, LEVL V, 40-54 MIN: ICD-10-PCS | Mod: HCNC,S$GLB,, | Performed by: NURSE PRACTITIONER

## 2019-10-24 PROCEDURE — 99215 OFFICE O/P EST HI 40 MIN: CPT | Mod: HCNC,S$GLB,, | Performed by: NURSE PRACTITIONER

## 2019-10-24 PROCEDURE — 99499 RISK ADDL DX/OHS AUDIT: ICD-10-PCS | Mod: HCNC,S$GLB,, | Performed by: NURSE PRACTITIONER

## 2019-10-24 PROCEDURE — 99999 PR PBB SHADOW E&M-EST. PATIENT-LVL V: CPT | Mod: PBBFAC,HCNC,, | Performed by: NURSE PRACTITIONER

## 2019-10-24 PROCEDURE — 99999 PR PBB SHADOW E&M-EST. PATIENT-LVL V: ICD-10-PCS | Mod: PBBFAC,HCNC,, | Performed by: NURSE PRACTITIONER

## 2019-10-24 PROCEDURE — 99499 UNLISTED E&M SERVICE: CPT | Mod: HCNC,S$GLB,, | Performed by: NURSE PRACTITIONER

## 2019-10-24 RX ORDER — FLUOXETINE 10 MG/1
10 CAPSULE ORAL DAILY
Refills: 4 | COMMUNITY
Start: 2019-10-09 | End: 2019-12-23 | Stop reason: SDUPTHER

## 2019-10-24 NOTE — PROGRESS NOTES
[facility]  New Visit Progress Note   Recent Hospital Discharge     PRESENTING HISTORY     Chief Complaint/Reason for Visit:  Follow up Hospital Discharge   No chief complaint on file.    PCP: Beverly Muniz MD    History of Present Illness: Ms. Lucy Perez is a 59 y.o. female who was recently admitted to the hospital.    Attestation signed by Yady Richardson MD at 10/8/2019 12:58 PM   I have seen Lucy Perez, reviewed the Resident's history and physical, assessment and plan. I have personally interviewed and examined the patient at bedside and: agree with the findings and plan for discharge.     Please feel free to contact me with any questions regarding this patient's care.     Yady Richardson MD  [facility]                 []Hide copied text    []Hover for details  [facility]  Discharge Summary        Patient Name: Lucy Perez  MRN: [id number]  Admission Date: 10/7/2019  Hospital Length of Stay: 1 days  Discharge Date and Time:  10/08/2019 12:40 PM  Attending Physician: Yady Richardson MD   Discharging Provider: Maciej Harvey MD  Primary Care Provider: Beverly Muniz MD  [facility] Team: [facility] Maciej Harvey MD     HPI:   60 yo F with a PMH of  ESRD secondary to HTN and Diabetes nephropathy on HD M,W,F Since August 2018 via RUE AVG, CVA s/p aneurysm rupture with residual hemiparesis, seizure disorder on Keppra,CHF (last EF 60-65%, grade 2 diastolic dysfunction), HTN, presents with 2 episodes of seizure from Home.  and daughter provided history since patient is sedated after a dose of Ativan. Patient had a pounding headache last night and woke up with one this morning.  At approximately 8 am, patient had seizure in which she looked to the left, was unresponsive, and convulsive for approximately 2 minutes, afterwards she was in a post-ictal state of confusion for approximately 5 minutes.   Family chose not to report to hospital after that incident as they were on her way to her hemodialysis appointment.  Family reports another seizure-incident like the first one at 9:40 am, at which point they drove to the emergency room.  In the emergency room, patient has another seizure at 1 pm, witnessed by ER doctor after which she was administered Ativan, Potassium was found to be 7.3 with peaked T waves on EKG.      Per family, she has been compliant with her medication, denies any preceding S/S of infection or any exposure to anyone who is ill.   Per daughter, she was diagnosed w/ a UTI 4 weeks ago and after undergoing treatment w/ Abx, she again had positive Cx and was started on Amoxiclav course which she completed on Saturday.     EEG 7/23 -  Abnormal EEG due to the presence of moderately severe diffuse slowing and right hemispheric periodic and intermittent epileptiform discharges w/o overt seizure activity is seen.  Neurology consulted for breakthrough seizures.     At time of interview patient was drowsy after the ativan, most of the history taking from the  and medical records.      * No surgery found *       Hospital Course:   Patient evaluated by neurology and by hospital medicine in emergency department. Given Ativan for active seizure and Keppra loaded 1500mg IV. Patient then dialyzed for post seizure hyperkalemia. Tolerated HD well and patient returned to baseline mental status overnight 10/7 into 10/8. Triggers for seizure likely due to multiple medications which can lower her seizure threshold and alter hepatic metabolism with recent use of fluconazole and increase of bupropion in a patient with history of seizures. Bupropion discontinued. UA suggestive of UTI but patient asymptomatic and anuric. Diuretics discontinued on discharge medical reconciliation. Keppra dosing adjusted to reflect HD status with 1000mg QD and additional 500mg following dialysis on MWF HD. Neurology arranged for  follow up with their clinic and PCP follow up arranged by case management. Patient stable and appropriate for discharge.           Vitals:     10/08/19 1244   BP:     Pulse: 76   Resp: 16   Temp:        Constitutional: She is oriented to person, place, and time. She appears well-developed and well-nourished. Sitting in wheelchair at bedside. Speaking in full sentences on room air. Laughing and conversing with  and team.   HENT:   Head: Normocephalic and atraumatic.   Eyes: Pupils are equal, round, and reactive to light. EOM are normal.   Neck: Normal range of motion. Neck supple.   Cardiovascular: Normal rate, regular rhythm and normal heart sounds.   Pulmonary/Chest: Effort normal. No respiratory distress. She has no wheezes.  Abdominal: Soft. Bowel sounds are normal. She exhibits no distension.   Musculoskeletal: Normal range of motion. She exhibits no edema or deformity.   Neurological: She is alert and oriented to person, place, and time. Dense left hemiparesis.   Skin: Skin is warm and dry. Fistula RUE  Psychiatric: She has a normal mood and affect.      Consults:           Consults (From admission, onward)         Status Ordering Provider       Inpatient consult to Nephrology  Once     Provider:  (Not yet assigned)    Completed STAN SONI       Inpatient consult to neurology  Once     Provider:  (Not yet assigned)    Completed SHANTEL JACOB             * Epilepsy without status epilepticus  Patient is a 60 yo female w/ history of epilepsy and other comorbid conditions that are listed above presents to the ED w/ two break-through seizures as noted by the family at bedside despite compliance w/ her Keppra. She is currently lethargic, which is likely the combination of her being post-ictal as well as after receiving 2 mg ativan in the ED.    toxicity 2/2 missed dialysis vs. medication non-compliance vs medication lower keppra threshold    Plan:   - Keppra level pending  - modified keppra dose to  reflect HD dosing for discharge 1000mg of Keppra QD and 500mg following HD on HD days  - CTH showed no new abnormality   - Avoid any seizure threshold lowering medication such as Wellbutrin, which was recently increased to 300 mg from 150 mg in the past 2 months, which appears on patient's MAR (Wellbutrin discontinued and will need to follow up with PCP for safer medication for anxiety/depression)  - tramadol also discontinued from MAR - although patient not taking - important to remove and not utilize as this can lower seizure threshold         ESRD (end stage renal disease) on dialysis  on HD M,W,F Since August 2018 via RUE AVG     Plan:   - Hyperkalemia improved from 7 to 4.4 after dialysis   - resume dialysis on schedule following discharge  - keppra dosing adjusted to reflect HD status, 1000mg QD with additional 500mg following HD on HD days         Type 2 diabetes mellitus with chronic kidney disease on chronic dialysis, with long-term current use of insulin  On insulin at home - home dosing on discharge        LEFT Hemiplegia as late effect of stroke  - stable finding  - wheelchair bound        Folic acid deficiency  - Continue home meds         Anemia in ESRD (end-stage renal disease)  Plan:   - daily CBC         Mixed anxiety and depressive disorder  - as in epilepsy without status  - patient was on bupropion with increase in dose in last 2 months  - bupropion discontinued as it can lower the seizure threshold and should be avoided in patient with history of epilepsy and known focus of epileptiform discharge  - will need close PCP review of safer medication choice in this patient         Hyperlipidemia  Continue home Atrovastatin        High anion gap metabolic acidosis-resolved as of 10/8/2019           Hyperkalemia-resolved as of 10/8/2019  K 6.9 in ED with EKG changes , received dialysis improved to 4.4      - Resolved                         Final Active Diagnoses:     Diagnosis Date Noted POA     PRINCIPAL PROBLEM:  Epilepsy without status epilepticus [G40.909] 06/20/2017 Yes    ESRD (end stage renal disease) on dialysis [N18.6, Z99.2] 08/11/2018 Not Applicable    Type 2 diabetes mellitus with chronic kidney disease on chronic dialysis, with long-term current use of insulin [E11.22, N18.6, Z99.2, Z79.4] 08/10/2018 Not Applicable    LEFT Hemiplegia as late effect of stroke [I69.359] 01/03/2018 Not Applicable    Folic acid deficiency [E53.8] 10/09/2017 Yes    Anemia in ESRD (end-stage renal disease) [N18.6, D63.1] 08/02/2017 Yes    Hyperlipidemia [E78.5] 03/05/2013 Yes    Mixed anxiety and depressive disorder [F41.8] 03/05/2013 Yes       Problems Resolved During this Admission:     Diagnosis Date Noted Date Resolved POA    High anion gap metabolic acidosis [E87.2] 10/07/2019 10/08/2019 Yes    Hyperkalemia [E87.5] 06/21/2019 10/08/2019 Yes         Discharged Condition: good     Disposition: Home or Self Care     Follow Up:  Patient Instructions:   No discharge procedures on file.     Significant Diagnostic Studies: Labs: All labs within the past 24 hours have been reviewed         Pending Diagnostic Studies:      None          Medications:  Reconciled Home Medications:       Medication List       CHANGE how you take these medications    albuterol 90 mcg/actuation inhaler  Commonly known as:  VENTOLIN HFA  Inhale 2 puffs into the lungs every 6 (six) hours as needed for Wheezing. Rescue  What changed:  Another medication with the same name was removed. Continue taking this medication, and follow the directions you see here.      bisacodyl 10 mg Supp  Commonly known as:  DULCOLAX  Place 1 suppository (10 mg total) rectally daily as needed.  What changed:  Another medication with the same name was removed. Continue taking this medication, and follow the directions you see here.      ergocalciferol 50,000 unit Cap  Commonly known as:  ERGOCALCIFEROL  Take 1 capsule (50,000 Units total) by mouth every 7  "days.  What changed:  Another medication with the same name was removed. Continue taking this medication, and follow the directions you see here.      lancets Misc  1 lancet by Misc.(Non-Drug; Combo Route) route 2 (two) times daily with meals. Check glucose before meals and bed  What changed:  Another medication with the same name was removed. Continue taking this medication, and follow the directions you see here.      * levETIRAcetam 1000 MG tablet  Commonly known as:  KEPPRA  Take 1 tablet (1,000 mg total) by mouth once daily.  What changed:    · medication strength  · how much to take  · when to take this      * levETIRAcetam 500 MG Tab  Commonly known as:  KEPPRA  Take 1 tablet (500 mg total) by mouth every Mon, Wed, Fri. Monday Wednesday and Friday after DIALYSIS take additional 500mg  Start taking on:  October 9, 2019  What changed:  You were already taking a medication with the same name, and this prescription was added. Make sure you understand how and when to take each.      pregabalin 25 MG capsule  Commonly known as:  LYRICA  Take 1 capsule (25 mg total) by mouth once daily. May take additional dose after HD.  What changed:    · when to take this  · reasons to take this           * This list has 2 medication(s) that are the same as other medications prescribed for you. Read the directions carefully, and ask your doctor or other care provider to review them with you.              CONTINUE taking these medications    acetaminophen 325 MG tablet  Commonly known as:  TYLENOL  Take 2 tablets (650 mg total) by mouth every 6 (six) hours as needed for Pain.      ALPRAZolam 0.5 MG tablet  Commonly known as:  XANAX  1 tab Every 8 hours as needed for anxiety      aspirin 81 MG EC tablet  Commonly known as:  ECOTRIN  Take 81 mg by mouth every morning.      atorvastatin 40 MG tablet  Commonly known as:  LIPITOR  TAKE 1 TABLET ONE TIME DAILY FOR CHOLESTEROL      BD ULTRA-FINE SHORT PEN NEEDLE 31 gauge x 5/16" " Ndle  Generic drug:  pen needle, diabetic  USE TO INJECT NOVOLOG FLEXPEN BEFORE MEALS      blood sugar diagnostic Strp  To check BG 4  times daily, to use with insurance preferred meter-true metrix      blood-glucose meter kit  To check BG 4 times daily, to use with insurance preferred meter-true metrix      famotidine 20 MG tablet  Commonly known as:  PEPCID  Take 1 tablet (20 mg total) by mouth once daily.      folic acid 1 MG tablet  Commonly known as:  FOLVITE  Take 1 tablet (1 mg total) by mouth once daily.      glipiZIDE 2.5 MG Tr24  Commonly known as:  GLUCOTROL  Take 1 tablet (hold if less than 150) at lunch (non dialysis days)      insulin aspart U-100 100 unit/mL injection  Commonly known as:  NovoLOG U-100 Insulin aspart  Use correction scale 180-230+1, 231-280+2, 281-330+3, 331-380+4, >380+5, max 15 units.      insulin glargine 100 unit/mL injection  Commonly known as:  LANTUS U-100 INSULIN  Inject 10-12 Units into the skin every evening.      polyethylene glycol 17 gram/dose powder  Commonly known as:  MIRALAX  Take 17 g by mouth 2 (two) times daily.      sevelamer carbonate 800 mg Tab  Commonly known as:  RENVELA  Take 800 mg by mouth 3 (three) times daily with meals.          STOP taking these medications    amoxicillin-clavulanate 250-125mg 250-125 mg Tab  Commonly known as:  AUGMENTIN      buPROPion 300 MG 24 hr tablet  Commonly known as:  WELLBUTRIN XL      ciprofloxacin HCl 500 MG tablet  Commonly known as:  CIPRO      fluconazole 150 MG Tab  Commonly known as:  DIFLUCAN      furosemide 40 MG tablet  Commonly known as:  LASIX      terconazole 0.8 % vaginal cream  Commonly known as:  TERAZOL 3      traMADol 50 mg tablet  Commonly known as:  ULTRAM          ASK your doctor about these medications    lidocaine-prilocaine cream  Commonly known as:  EMLA  Apply topically as needed.                Indwelling Lines/Drains at time of discharge:       Lines/Drains/Airways            Central Venous Catheter  "Line                          Percutaneous Central Line Insertion/Assessment - triple lumen  07/06/19 2035 93 days             Drain                          Hemodialysis AV Graft Right upper arm -- days                   Pressure Ulcer                          Pressure Injury 07/05/19 Left Buttocks Stage 2 95 days           Pressure Injury 07/05/19 1600 Right Buttocks Stage 2 94 days                     Time spent on the discharge of patient: 35 minutes  Patient was seen and examined on the date of discharge and determined to be suitable for discharge.           Maciej Harvey MD  Department of Hospital Medicine  [facility]      Cosigned by: Yady Richardson MD at 10/8/2019 12:58 PM   Electronically signed by Maciej Harvey MD at 10/8/2019 12:53 PM  Electronically signed by Yady Richardson MD at 10/8/2019 12:58 PM       ED to Hosp-Admission (Discharged) on 10/7/2019          Revision & Routing History          Detailed Report        ___________________________________________________________________    Today:  Ms. Ayala is a very pleasant female, who is known to me from recent post hospitalization clinic visit. She is accompanied by her very supportive spouse today.   Since most recent discharge, she has reportedly been "doing well" with no breakthrough seizure activity per her spouse.   Presents to clinic today with no complaints.       Review of Systems:  Eyes: denies visual changes at this time denies floaters   ENT: no nasal congestion or sore throat  Respiratory: no cough or shorness of breath  Cardiovascular: no chest pain or palpitations  Gastrointestinal: no nausea or vomiting, no abdominal pain or change in bowel habits  Genitourinary: no hematuria or dysuria; denies frequency  Hematologic/Lymphatic: no easy bruising or lymphadenopathy  Musculoskeletal: no arthralgias or myalgias  Neurological: no seizures or tremors  Endocrine: no heat or cold intolerance      PAST HISTORY: "     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    Cortical cataract of both eyes 7/26/2016    Decubitus ulcer of buttock, stage 2     Degeneration of lumbar or lumbosacral intervertebral disc 3/5/2013    S/p MRI L-spine 5/2009     Depression     Encounter for blood transfusion     ESRD on hemodialysis     Started August 2018    General anesthetics causing adverse effect in therapeutic use     Hemorrhagic cerebrovascular accident (CVA)     8[place] [facility] [place]    History of stroke 6/28/2017    s/p R-MCA stroke with R-putaminal hemorrhagic transformation in 8/2016 and 11/2016 ([place] [facility] with residual L hemiparesis, on AED s/p CVA      Hypertensive retinopathy of both eyes 7/26/2016    Impingement syndrome of right shoulder 7/2/2014    Obesity     THERESA (obstructive sleep apnea) 3/5/2013    No Home CPAP 2ndary to cost     Partial symptomatic epilepsy with complex partial seizures, not intractable, without status epilepticus     Rheumatoid arthritis(714.0)     Rotator cuff tear 7/2/2014    Sarcoidosis     Stroke 2016    left sided flaccidity, SAH    Vertebral artery stenosis 3/5/2013    S/p Stenting per Dr Burnett        Past Surgical History:   Procedure Laterality Date    BREAST SURGERY      breast reduction    COLONOSCOPY N/A 8/11/2016    Procedure: COLONOSCOPY;  Surgeon: Jerry Vilchis MD;  Location: [address]);  Service: Endoscopy;  Laterality: N/A;  Patient reports difficulty awaking from anesthesia in the past.    DECLOTTING OF VASCULAR GRAFT Right 6/20/2019    Procedure: DECLOT-GRAFT;  Surgeon: Cabrera IBARRA  "MD Alida;  Location: [facility] CATH LAB;  Service: Cardiology;  Laterality: Right;    FISTULOGRAM Right 2/11/2019    Procedure: Fistulogram;  Surgeon: Meir Valencia MD;  Location: [facility] CATH LAB;  Service: Peripheral Vascular;  Laterality: Right;    FISTULOGRAM Right 7/8/2019    Procedure: Fistulogram;  Surgeon: WELLINGTON Palm III, MD;  Location: [facility] CATH LAB;  Service: Peripheral Vascular;  Laterality: Right;    HYSTERECTOMY  1999    JOSE LUIS/BSO (AUB)    PLACEMENT OF ARTERIOVENOUS GRAFT Right 10/18/2018    Procedure: AV GRAFT CREATION;  Surgeon: Meir Valencia MD;  Location: [facility] OR [facility] FLR;  Service: Cardiovascular;  Laterality: Right;    ROTATOR CUFF REPAIR Right July 9, 2014    right side    Skull surgery      Aneurysm    stent placed      in vertebral artery    TOTAL REDUCTION MAMMOPLASTY      TUBAL LIGATION         Family History   Problem Relation Age of Onset    Diabetes Mother     Hypertension Mother     Heart disease Mother     Heart attack Mother     Breast cancer Mother     Stroke Sister     Hypertension Sister     Sleep apnea Sister     No Known Problems Daughter     Diabetes Son     Bell's palsy Sister     Lupus Sister     Blindness Maternal Grandmother     Diabetes Unknown         "My entire family family has diabetes"    Stroke Maternal Aunt     Colon cancer Neg Hx     Ovarian cancer Neg Hx        Social History     Socioeconomic History    Marital status:      Spouse name: Not on file    Number of children: Not on file    Years of education: Not on file    Highest education level: Not on file   Occupational History    Not on file   Social Needs    Financial resource strain: Not on file    Food insecurity:     Worry: Not on file     Inability: Not on file    Transportation needs:     Medical: Not on file     Non-medical: Not on file   Tobacco Use    Smoking status: Never Smoker    Smokeless tobacco: Never Used   Substance and Sexual Activity    Alcohol " "use: Yes     Comment: occasional wine cooler     Drug use: Never    Sexual activity: Yes     Partners: Male     Birth control/protection: Post-menopausal   Lifestyle    Physical activity:     Days per week: Not on file     Minutes per session: Not on file    Stress: Not on file   Relationships    Social connections:     Talks on phone: Not on file     Gets together: Not on file     Attends Jehovah's witness service: Not on file     Active member of club or organization: Not on file     Attends meetings of clubs or organizations: Not on file     Relationship status: Not on file   Other Topics Concern    Not on file   Social History Narrative    . 2 children(Wilder and Reina). Does not work. Previously worked as a .        MEDICATIONS & ALLERGIES:     Current Outpatient Medications on File Prior to Visit   Medication Sig Dispense Refill    acetaminophen (TYLENOL) 325 MG tablet Take 2 tablets (650 mg total) by mouth every 6 (six) hours as needed for Pain.  0    albuterol (VENTOLIN HFA) 90 mcg/actuation inhaler Inhale 2 puffs into the lungs every 6 (six) hours as needed for Wheezing. Rescue 18 g 0    ALPRAZolam (XANAX) 0.5 MG tablet 1 tab Every 8 hours as needed for anxiety 30 tablet 0    aspirin (ECOTRIN) 81 MG EC tablet Take 81 mg by mouth every morning.       atorvastatin (LIPITOR) 40 MG tablet TAKE 1 TABLET ONE TIME DAILY FOR CHOLESTEROL 90 tablet 2    BD INSULIN PEN NEEDLE UF SHORT 31 gauge x 5/16" Ndle USE TO INJECT NOVOLOG FLEXPEN BEFORE MEALS 150 each 11    bisacodyl (DULCOLAX) 10 mg Supp Place 1 suppository (10 mg total) rectally daily as needed. 30 suppository 3    blood sugar diagnostic Strp To check BG 4  times daily, to use with insurance preferred meter-true metrix 400 each 3    blood-glucose meter kit To check BG 4 times daily, to use with insurance preferred meter-true metrix 1 each 1    ergocalciferol (ERGOCALCIFEROL) 50,000 unit Cap Take 1 capsule (50,000 Units total) " by mouth every 7 days.      famotidine (PEPCID) 20 MG tablet Take 1 tablet (20 mg total) by mouth once daily. 90 tablet 2    FLUoxetine 10 MG Tab Take 1 tablet (10 mg total) by mouth once daily. 30 tablet 4    folic acid (FOLVITE) 1 MG tablet Take 1 tablet (1 mg total) by mouth once daily. 90 tablet 2    glipiZIDE (GLUCOTROL) 2.5 MG TR24 Take 1 tablet (hold if less than 150) at lunch (non dialysis days) 30 tablet 4    insulin aspart U-100 (NOVOLOG U-100 INSULIN ASPART) 100 unit/mL injection Use correction scale 180-230+1, 231-280+2, 281-330+3, 331-380+4, >380+5, max 15 units. 3 vial 3    insulin glargine (LANTUS U-100 INSULIN) 100 unit/mL injection Inject 10-12 Units into the skin every evening. 6 mL 6    lancets Misc 1 lancet by Misc.(Non-Drug; Combo Route) route 2 (two) times daily with meals. Check glucose before meals and bed 300 each 4    levETIRAcetam (KEPPRA) 1000 MG tablet Take 1 tablet (1,000 mg total) by mouth once daily. 90 tablet 3    levETIRAcetam (KEPPRA) 500 MG Tab Take 1 tablet (500 mg total) by mouth every Mon, Wed, Fri. Monday Wednesday and Friday after DIALYSIS take additional 500mg 36 tablet 3    polyethylene glycol (MIRALAX) 17 gram/dose powder Take 17 g by mouth 2 (two) times daily. 1 Bottle 6    pregabalin (LYRICA) 25 MG capsule Take 1 capsule (25 mg total) by mouth once daily. May take additional dose after HD. (Patient taking differently: Take 25 mg by mouth daily as needed. May take additional dose after HD.) 90 capsule 4    sevelamer carbonate (RENVELA) 800 mg Tab Take 800 mg by mouth 3 (three) times daily with meals.       No current facility-administered medications on file prior to visit.         Review of patient's allergies indicates:   Allergen Reactions    Lisinopril Other (See Comments)     Angioedema      Vicodin [hydrocodone-acetaminophen] Rash     No problem with acetaminophen        OBJECTIVE:     Vital Signs:  There were no vitals filed for this visit.  Wt  Readings from Last 1 Encounters:   10/07/19 2019 77.6 kg (171 lb 1.2 oz)   10/07/19 1125 77.6 kg (171 lb)     There is no height or weight on file to calculate BMI.     Wt Readings from Last 3 Encounters:   10/24/19 77.6 kg (171 lb)   10/07/19 77.6 kg (171 lb 1.2 oz)   10/03/19 81.6 kg (180 lb)     Temp Readings from Last 3 Encounters:   10/08/19 98 °F (36.7 °C) (Oral)   09/05/19 98.7 °F (37.1 °C) (Oral)   09/03/19 98.3 °F (36.8 °C) (Oral)     BP Readings from Last 3 Encounters:   10/24/19 100/68   10/08/19 123/80   10/03/19 123/80     Pulse Readings from Last 3 Encounters:   10/24/19 84   10/08/19 76   09/05/19 90         Physical Exam:  General: Well developed, well nourished. No distress.  HEENT: Head is normocephalic, atraumatic; ears are normal.   Eyes: Clear conjunctiva.  Neck: Supple, symmetrical neck; trachea midline.  Lungs: Clear to auscultation bilaterally and normal respiratory effort.  Cardiovascular: Heart with regular rate and rhythm. No murmurs, gallops or rubs  Extremities: No LE edema. Pulses 2+ and symmetric.   RUE: AV Graft with +bruit and thrill   Left hand contracted with left hemiparesis in the setting of remote h/o CVA  Abdomen: Abdomen is soft, non-tender non-distended with normal bowel sounds.  Skin: Skin color, texture, turgor normal. No rashes.  Musculoskeletal: Normal gait.   Lymph Nodes: No cervical or supraclavicular adenopathy.  Neurologic: Normal strength and tone. No focal numbness or weakness.   Psychiatric: Not depressed.        Laboratory  Lab Results   Component Value Date    WBC 4.18 10/08/2019    HGB 11.8 (L) 10/08/2019    HCT 39.1 10/08/2019     (H) 10/08/2019     10/08/2019     BMP  Lab Results   Component Value Date     10/08/2019    K 5.2 (H) 10/08/2019    CL 99 10/08/2019    CO2 26 10/08/2019    BUN 48 (H) 10/08/2019    CREATININE 5.9 (H) 10/08/2019    CALCIUM 9.3 10/08/2019    ANIONGAP 13 10/08/2019    ESTGFRAFRICA 8.2 (A) 10/08/2019    EGFRNONAA 7.1  (A) 10/08/2019     Lab Results   Component Value Date    ALT 9 (L) 10/07/2019    AST 17 10/07/2019    ALKPHOS 93 10/07/2019    BILITOT 0.4 10/07/2019     Lab Results   Component Value Date    INR 0.9 12/08/2018    INR 0.9 08/04/2017    INR 0.9 08/03/2017     Lab Results   Component Value Date    HGBA1C 6.8 (H) 08/29/2019     No results for input(s): POCTGLUCOSE in the last 72 hours.      TRANSITION OF CARE:     [facility] On Call Contact Note: 10/10/2019    Family and/or Caretaker present at visit?  Yes.  Diagnostic tests reviewed/disposition: I have reviewed all completed as well as pending diagnostic tests at the time of discharge.  Disease/illness education: Seizure Disorder, DM II, ESRD, Meds  Home health/community services discussion/referrals: Patient does not have home health established from hospital visit.  They do not need home health.  If needed, we will set up home health for the patient.   Establishment or re-establishment of referral orders for community resources: No other necessary community resources.   Discussion with other health care providers: No discussion with other health care providers necessary.     Transition of Care Visit:     I have reviewed and updated the history and problem list.  I have reconciled the medication list.  I have discussed the hospitalization and current medical issues, prognosis and plans with the patient/family.  I  spent more than 50% of time discussing the care with the patient/family.  Total Face-to-Face Encounter in the [facility]: 60 minutes.    Medications Reconciliation:   I have reconciled the patient's home medications and discharge medications with the patient/family. I have updated all changes.  Refer to After-Visit Medication List.    ASSESSMENT & PLAN:     HIGH RISK CONDITION(S):      Recent admission for Epilepsy without status epilepticus, in the setting of history of Seizure Disorder believe to have been the result of oral antibiotic therapy for a  "recent UTI, which she has since completed:   *No witnessed or reported recurrent seizure like activity since most recent discharge  *ED with two break-through seizures as noted by the family at bedside despite compliance w/ her Keppra.  *Seen by Neurology while IP  *CTH to evaluate for any acute intracranial abnormalities unremarkable  ` Keppra (complaint and takes medications daily).   ` scheduled follow up with Neuro 11/7/2019          Other Medical Issues:  Type 2 diabetes mellitus with chronic kidney disease on chronic dialysis, with long-term current use of insulin:   *Most recent A1c: 6.8% (8/2019)  Has been having some "highs" since most recent discharge; has the log on her phone; has not been using the SSI; she is an ESRD  / HD dependent patient and judicious mgt of insulin therapy is imperative. Has been given instructions on use the log, which she and her supportive spouse are familiar with.   ` continue Glucatrol   ` continue Aspart SSI (has not been using her SSI, but understands how to use the scale)  ` continue Lantus 12         ESRD (end stage renal disease) on dialysis:   Chronic Anemia in ESRD (end-stage renal disease)  *HD on M, W, F  (RUE AV Graft)  `Renvela       Hyperlipidemia:   ` continue Lipitor       [facility] Visit (Post Discharge Follow-up) Today:   - Our clinic physicians and nurses plan to follow the patient up for any medical issues in the [facility] for 30 days post discharge.    Future Appointments   Date Time Provider Department Center   11/7/2019  9:30 AM Park Gibbs MD [facility] NEURO [address]   11/21/2019 10:45 AM Fior Ricks DPM [facility] POD [address]   12/24/2019 10:00 AM Sia Mooney RN [facility] INTLDIA [address] [facility]   12/24/2019 11:30 AM PARESH Lacy, FNP [facility] IM [address] [facility]   1/7/2020 11:00 AM Beverly Muniz MD [facility]        Medication List           Accurate as of October 24, 2019  4:04 PM. If you have any questions, ask your nurse or " "doctor.               CHANGE how you take these medications    pregabalin 25 MG capsule  Commonly known as:  LYRICA  Take 1 capsule (25 mg total) by mouth once daily. May take additional dose after HD.  What changed:    · when to take this  · reasons to take this        CONTINUE taking these medications    acetaminophen 325 MG tablet  Commonly known as:  TYLENOL  Take 2 tablets (650 mg total) by mouth every 6 (six) hours as needed for Pain.     albuterol 90 mcg/actuation inhaler  Commonly known as:  VENTOLIN HFA  Inhale 2 puffs into the lungs every 6 (six) hours as needed for Wheezing. Rescue     ALPRAZolam 0.5 MG tablet  Commonly known as:  XANAX  1 tab Every 8 hours as needed for anxiety     aspirin 81 MG EC tablet  Commonly known as:  ECOTRIN     atorvastatin 40 MG tablet  Commonly known as:  LIPITOR  TAKE 1 TABLET ONE TIME DAILY FOR CHOLESTEROL     BD ULTRA-FINE SHORT PEN NEEDLE 31 gauge x 5/16" Ndle  Generic drug:  pen needle, diabetic  USE TO INJECT NOVOLOG FLEXPEN BEFORE MEALS     bisacodyl 10 mg Supp  Commonly known as:  DULCOLAX  Place 1 suppository (10 mg total) rectally daily as needed.     blood sugar diagnostic Strp  To check BG 4  times daily, to use with insurance preferred meter-true metrix     blood-glucose meter kit  To check BG 4 times daily, to use with insurance preferred meter-true metrix     ergocalciferol 50,000 unit Cap  Commonly known as:  ERGOCALCIFEROL  Take 1 capsule (50,000 Units total) by mouth every 7 days.     famotidine 20 MG tablet  Commonly known as:  PEPCID  Take 1 tablet (20 mg total) by mouth once daily.     * FLUoxetine 10 MG Tab  Take 1 tablet (10 mg total) by mouth once daily.     * FLUoxetine 10 MG capsule     folic acid 1 MG tablet  Commonly known as:  FOLVITE  Take 1 tablet (1 mg total) by mouth once daily.     glipiZIDE 2.5 MG Tr24  Commonly known as:  GLUCOTROL  Take 1 tablet (hold if less than 150) at lunch (non dialysis days)     insulin aspart U-100 100 unit/mL " injection  Commonly known as:  NovoLOG U-100 Insulin aspart  Use correction scale 180-230+1, 231-280+2, 281-330+3, 331-380+4, >380+5, max 15 units.     insulin glargine 100 unit/mL injection  Commonly known as:  LANTUS U-100 INSULIN  Inject 10-12 Units into the skin every evening.     lancets Misc  1 lancet by Misc.(Non-Drug; Combo Route) route 2 (two) times daily with meals. Check glucose before meals and bed     * levETIRAcetam 1000 MG tablet  Commonly known as:  KEPPRA  Take 1 tablet (1,000 mg total) by mouth once daily.     * levETIRAcetam 500 MG Tab  Commonly known as:  KEPPRA  Take 1 tablet (500 mg total) by mouth every Mon, Wed, Fri. Monday Wednesday and Friday after DIALYSIS take additional 500mg     polyethylene glycol 17 gram/dose powder  Commonly known as:  MIRALAX  Take 17 g by mouth 2 (two) times daily.     sevelamer carbonate 800 mg Tab  Commonly known as:  RENVELA         * This list has 4 medication(s) that are the same as other medications prescribed for you. Read the directions carefully, and ask your doctor or other care provider to review them with you.                Signing Physician:  MARISELA Alejandra

## 2019-11-06 ENCOUNTER — PATIENT OUTREACH (OUTPATIENT)
Dept: ADMINISTRATIVE | Facility: OTHER | Age: 59
End: 2019-11-06

## 2019-11-07 ENCOUNTER — OFFICE VISIT (OUTPATIENT)
Dept: NEUROLOGY | Facility: CLINIC | Age: 59
End: 2019-11-07
Payer: MEDICARE

## 2019-11-07 VITALS — HEIGHT: 63 IN | BODY MASS INDEX: 33.6 KG/M2 | WEIGHT: 189.63 LBS

## 2019-11-07 DIAGNOSIS — I10 ESSENTIAL HYPERTENSION: ICD-10-CM

## 2019-11-07 DIAGNOSIS — Z99.2 TYPE 2 DIABETES MELLITUS WITH CHRONIC KIDNEY DISEASE ON CHRONIC DIALYSIS, WITH LONG-TERM CURRENT USE OF INSULIN: ICD-10-CM

## 2019-11-07 DIAGNOSIS — Z86.73 HISTORY OF STROKE: ICD-10-CM

## 2019-11-07 DIAGNOSIS — G81.94 LEFT HEMIPARESIS: ICD-10-CM

## 2019-11-07 DIAGNOSIS — G40.209 PARTIAL SYMPTOMATIC EPILEPSY WITH COMPLEX PARTIAL SEIZURES, NOT INTRACTABLE, WITHOUT STATUS EPILEPTICUS: Primary | ICD-10-CM

## 2019-11-07 DIAGNOSIS — N18.6 TYPE 2 DIABETES MELLITUS WITH CHRONIC KIDNEY DISEASE ON CHRONIC DIALYSIS, WITH LONG-TERM CURRENT USE OF INSULIN: ICD-10-CM

## 2019-11-07 DIAGNOSIS — Z79.4 TYPE 2 DIABETES MELLITUS WITH CHRONIC KIDNEY DISEASE ON CHRONIC DIALYSIS, WITH LONG-TERM CURRENT USE OF INSULIN: ICD-10-CM

## 2019-11-07 DIAGNOSIS — I69.359 HEMIPLEGIA AS LATE EFFECT OF STROKE: ICD-10-CM

## 2019-11-07 DIAGNOSIS — E11.22 TYPE 2 DIABETES MELLITUS WITH CHRONIC KIDNEY DISEASE ON CHRONIC DIALYSIS, WITH LONG-TERM CURRENT USE OF INSULIN: ICD-10-CM

## 2019-11-07 DIAGNOSIS — E78.2 MIXED HYPERLIPIDEMIA: ICD-10-CM

## 2019-11-07 PROBLEM — G40.909 EPILEPSY WITHOUT STATUS EPILEPTICUS: Status: RESOLVED | Noted: 2017-06-20 | Resolved: 2019-11-07

## 2019-11-07 PROCEDURE — 3044F PR MOST RECENT HEMOGLOBIN A1C LEVEL <7.0%: ICD-10-PCS | Mod: HCNC,CPTII,GC,S$GLB | Performed by: PSYCHIATRY & NEUROLOGY

## 2019-11-07 PROCEDURE — 3008F PR BODY MASS INDEX (BMI) DOCUMENTED: ICD-10-PCS | Mod: HCNC,CPTII,GC,S$GLB | Performed by: PSYCHIATRY & NEUROLOGY

## 2019-11-07 PROCEDURE — 99214 PR OFFICE/OUTPT VISIT, EST, LEVL IV, 30-39 MIN: ICD-10-PCS | Mod: HCNC,GC,S$GLB, | Performed by: PSYCHIATRY & NEUROLOGY

## 2019-11-07 PROCEDURE — 3044F HG A1C LEVEL LT 7.0%: CPT | Mod: HCNC,CPTII,GC,S$GLB | Performed by: PSYCHIATRY & NEUROLOGY

## 2019-11-07 PROCEDURE — 99214 OFFICE O/P EST MOD 30 MIN: CPT | Mod: HCNC,GC,S$GLB, | Performed by: PSYCHIATRY & NEUROLOGY

## 2019-11-07 PROCEDURE — 99999 PR PBB SHADOW E&M-EST. PATIENT-LVL V: CPT | Mod: PBBFAC,HCNC,GC, | Performed by: STUDENT IN AN ORGANIZED HEALTH CARE EDUCATION/TRAINING PROGRAM

## 2019-11-07 PROCEDURE — 3008F BODY MASS INDEX DOCD: CPT | Mod: HCNC,CPTII,GC,S$GLB | Performed by: PSYCHIATRY & NEUROLOGY

## 2019-11-07 PROCEDURE — 99999 PR PBB SHADOW E&M-EST. PATIENT-LVL V: ICD-10-PCS | Mod: PBBFAC,HCNC,GC, | Performed by: STUDENT IN AN ORGANIZED HEALTH CARE EDUCATION/TRAINING PROGRAM

## 2019-11-07 NOTE — ASSESSMENT & PLAN NOTE
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

## 2019-11-07 NOTE — PATIENT INSTRUCTIONS
- PT therapy ordered  - Follow up in 6 months or earlier if needed  - If you need help w/ ordering the ankle brace, please have the physical therapist message or call the office

## 2019-11-07 NOTE — ASSESSMENT & PLAN NOTE
- Patient to remain on the following therapy  - Keppra 1000 mg QD   - Keppra 500 mg QD additionally to the daily dose on days of HD ( MWF)  - f/u in 6 months  - Keppra levels prior to next visit

## 2019-11-07 NOTE — PROGRESS NOTES
Neurology Clinic  Initial Consult    Patient Name: Lucy Perez  MRN: [id number]    CC: Hospital Follow up     HPI: Lucy Perez is a 59 y.o. female w/ past medical history significant for ESRD on HD ( MWF), R-MCA (hemorrhagic transformation and s/p [place]micrani at [facility] in [place]) w/ residual L hemiparesis (wheelchair-bound), CHF (last EF 60-65%, grade 2 diastolic dysfunction), RA, sarcoidosis, L rotator cuff surgery,  HTN , DM II who is currently undergoing evaluation for a possible kidney, kidney/pancreas or pancreas only transplant, and Hx of Epilepsy( recently admitted to [facility] for breakthrough event on 10/07) who presents to clinic for hospital follow up and to establish care.  Patient's AEDs were previously managed by her PCP and patient was taking 500 mg TID regardless as to whether it was an HD day or not. Patient was also on Wellbutrin, w/ recent increase to 300 mg QD, which is known to lower seizure threshold.   While inpatient medication adjusted to 1000 mg QD w/ additional dose of 500 mg s/p HD. Patient started on Fluoxetine on 10/24  as Wellbutrin was stopped while inpatient due to it's potential to lower seizure threshold.   Since discharge, patient has been doing well with no break through events.  She continues to attend PT and is doing well.  Patient is compliant w/ her medication.   presents w/ patient and provides some of the history     LAST EEG 7/23 -  Abnormal EEG due to the presence of moderately severe diffuse slowing and right hemispheric periodic and intermittent epileptiform discharges w/o overt seizure activity is seen.  AEDs tried   -  Keppra 1000 mg QD w/ additional 500 mg MWF after HD (current)  Seizure semiology   -  GTC w/ L sided head version    -  Onset in 2016 after the R MCA    Review of Systems:  General: No fevers, chills  Eyes: No changes in vision  ENT: No changes in hearing  Respiratory: No SOB  CV: No chest pain, palpitations  GI: No diarrhea, blood in  stool  Urinary: No dysuria, hematuria  Skin: No rashes  Neurological: No weakness, confusion  Psychiatric: No auditory nor visual hallucinations      Past Medical History  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    Cortical cataract of both eyes 7/26/2016    Decubitus ulcer of buttock, stage 2     Degeneration of lumbar or lumbosacral intervertebral disc 3/5/2013    S/p MRI L-spine 5/2009     Depression     Encounter for blood transfusion     ESRD on hemodialysis     Started August 2018    General anesthetics causing adverse effect in therapeutic use     Hemorrhagic cerebrovascular accident (CVA)     8/2016 s/p Hemicraniotomy at [facility] [place] with Left hemiparesis    History of stroke 6/28/2017    s/p R-MCA stroke with R-putaminal hemorrhagic transformation in 8/2016 and 11/2016 (s/p hemicraniotomy at [facility]) with residual L hemiparesis, on AED s/p CVA      Hypertensive retinopathy of both eyes 7/26/2016    Impingement syndrome of right shoulder 7/2/2014    Obesity     THERESA (obstructive sleep apnea) 3/5/2013    No Home CPAP 2ndary to cost     Partial symptomatic epilepsy with complex partial seizures, not intractable, without status epilepticus     Rheumatoid arthritis(714.0)     Rotator cuff tear 7/2/2014    Sarcoidosis     Stroke 2016    left sided flaccidity, SAH    Vertebral artery stenosis 3/5/2013    S/p Stenting per Dr Burnett        Medications    Current Outpatient Medications:     acetaminophen (TYLENOL) 325 MG tablet, Take 2 tablets (650 mg total) by mouth every 6 (six) hours as needed for Pain., Disp: , Rfl: 0    albuterol (VENTOLIN HFA) 90 mcg/actuation inhaler,  "Inhale 2 puffs into the lungs every 6 (six) hours as needed for Wheezing. Rescue, Disp: 18 g, Rfl: 0    ALPRAZolam (XANAX) 0.5 MG tablet, 1 tab Every 8 hours as needed for anxiety, Disp: 30 tablet, Rfl: 0    aspirin (ECOTRIN) 81 MG EC tablet, Take 81 mg by mouth every morning. , Disp: , Rfl:     atorvastatin (LIPITOR) 40 MG tablet, TAKE 1 TABLET ONE TIME DAILY FOR CHOLESTEROL, Disp: 90 tablet, Rfl: 2    BD INSULIN PEN NEEDLE UF SHORT 31 gauge x 5/16" Ndle, USE TO INJECT NOVOLOG FLEXPEN BEFORE MEALS, Disp: 150 each, Rfl: 11    bisacodyl (DULCOLAX) 10 mg Supp, Place 1 suppository (10 mg total) rectally daily as needed., Disp: 30 suppository, Rfl: 3    blood sugar diagnostic Strp, To check BG 4  times daily, to use with insurance preferred meter-true metrix, Disp: 400 each, Rfl: 3    blood-glucose meter kit, To check BG 4 times daily, to use with insurance preferred meter-true metrix, Disp: 1 each, Rfl: 1    ergocalciferol (ERGOCALCIFEROL) 50,000 unit Cap, Take 1 capsule (50,000 Units total) by mouth every 7 days., Disp: , Rfl:     famotidine (PEPCID) 20 MG tablet, Take 1 tablet (20 mg total) by mouth once daily., Disp: 90 tablet, Rfl: 2    FLUoxetine 10 MG capsule, Take 10 mg by mouth once daily., Disp: , Rfl: 4    FLUoxetine 10 MG Tab, Take 1 tablet (10 mg total) by mouth once daily., Disp: 30 tablet, Rfl: 4    folic acid (FOLVITE) 1 MG tablet, Take 1 tablet (1 mg total) by mouth once daily., Disp: 90 tablet, Rfl: 2    glipiZIDE (GLUCOTROL) 2.5 MG TR24, Take 1 tablet (hold if less than 150) at lunch (non dialysis days), Disp: 30 tablet, Rfl: 4    insulin aspart U-100 (NOVOLOG U-100 INSULIN ASPART) 100 unit/mL injection, Use correction scale 180-230+1, 231-280+2, 281-330+3, 331-380+4, >380+5, max 15 units., Disp: 3 vial, Rfl: 3    insulin glargine (LANTUS U-100 INSULIN) 100 unit/mL injection, Inject 10-12 Units into the skin every evening., Disp: 6 mL, Rfl: 6    lancets Misc, 1 lancet by " Misc.(Non-Drug; Combo Route) route 2 (two) times daily with meals. Check glucose before meals and bed, Disp: 300 each, Rfl: 4    levETIRAcetam (KEPPRA) 1000 MG tablet, Take 1 tablet (1,000 mg total) by mouth once daily., Disp: 90 tablet, Rfl: 3    levETIRAcetam (KEPPRA) 500 MG Tab, Take 1 tablet (500 mg total) by mouth every Mon, Wed, Fri. Monday Wednesday and Friday after DIALYSIS take additional 500mg, Disp: 36 tablet, Rfl: 3    polyethylene glycol (MIRALAX) 17 gram/dose powder, Take 17 g by mouth 2 (two) times daily., Disp: 1 Bottle, Rfl: 6    pregabalin (LYRICA) 25 MG capsule, Take 1 capsule (25 mg total) by mouth once daily. May take additional dose after HD. (Patient taking differently: Take 25 mg by mouth daily as needed. May take additional dose after HD.), Disp: 90 capsule, Rfl: 4    sevelamer carbonate (RENVELA) 800 mg Tab, Take 800 mg by mouth 3 (three) times daily with meals., Disp: , Rfl:   Any other notable medications as documented in HPI    Allergies  Review of patient's allergies indicates:   Allergen Reactions    Lisinopril Other (See Comments)     Angioedema      Vicodin [hydrocodone-acetaminophen] Rash     No problem with acetaminophen        Social History  Social History     Socioeconomic History    Marital status:      Spouse name: Not on file    Number of children: Not on file    Years of education: Not on file    Highest education level: Not on file   Occupational History    Not on file   Social Needs    Financial resource strain: Not on file    Food insecurity:     Worry: Not on file     Inability: Not on file    Transportation needs:     Medical: Not on file     Non-medical: Not on file   Tobacco Use    Smoking status: Never Smoker    Smokeless tobacco: Never Used   Substance and Sexual Activity    Alcohol use: Yes     Comment: occasional wine cooler     Drug use: Never    Sexual activity: Yes     Partners: Male     Birth control/protection: Post-menopausal  "  Lifestyle    Physical activity:     Days per week: Not on file     Minutes per session: Not on file    Stress: Not on file   Relationships    Social connections:     Talks on phone: Not on file     Gets together: Not on file     Attends Restoration service: Not on file     Active member of club or organization: Not on file     Attends meetings of clubs or organizations: Not on file     Relationship status: Not on file   Other Topics Concern    Not on file   Social History Narrative    . 2 children(Wilder and Reina). Does not work. Previously worked as a .      Any other notable Social History as documented in HPI.    Family History  Family History   Problem Relation Age of Onset    Diabetes Mother     Hypertension Mother     Heart disease Mother     Heart attack Mother     Breast cancer Mother     Stroke Sister     Hypertension Sister     Sleep apnea Sister     No Known Problems Daughter     Diabetes Son     Bell's palsy Sister     Lupus Sister     Blindness Maternal Grandmother     Diabetes Unknown         "My entire family family has diabetes"    Stroke Maternal Aunt     Colon cancer Neg Hx     Ovarian cancer Neg Hx      Any other notable FMH as documented in HPI.    Physical Exam  Ht 5' 3" (1.6 m)   Wt 86 kg (189 lb 9.5 oz)   LMP  (LMP Unknown)   BMI 33.59 kg/m²     General: Well-developed, well-groomed. No apparent distress  HEENT:  NCAT, PERRLA  Neck:  Supple, normal ROM without nuchal rigidity    Neurologic Exam: The patient is awake, alert and oriented. Language is fluent.  Fund of knowledge is appropriate.     Mental Status:  AAOx3.  Speech, thought content appropriate.  Recent, remote recall 3/3.  Cranial Nerves:  VFs intact on counting fingers in all quadrants bilaterally.  PERRLA, EOMI.  L facial weakness.  Palate raises symmetrically, tongue protrudes midline.    Motor:  Normal bulk and tone on R, increased tone on L.  RUE shoulder abduction 4/5, " biceps/triceps4/5,  strength 5/5.  RLE hip flexors 3/5, knee flexion/extension 4-/5, plantarflexion/dorsiflexion 4/5.  Contracted on the LUE, withdraws to painful stimuli in UE and LE on L. Some distal movement noted in the LE, primarily toe movement.   Sensory:  Intact to light touch at all extremities and face without inattention.    Reflexes:  Biceps, brachioradialis 1+ on R and 3+ on L.  Coordination:  FNF is intact with no dysmetria on the R.  No resting tremor.  Gait:  Wheelchair-bound    Lab and Test Results    WBC   Date Value Ref Range Status   10/08/2019 4.18 3.90 - 12.70 K/uL Final   10/07/2019 5.86 3.90 - 12.70 K/uL Final   08/29/2019 6.51 3.90 - 12.70 K/uL Final     Hemoglobin   Date Value Ref Range Status   10/08/2019 11.8 (L) 12.0 - 16.0 g/dL Final   10/07/2019 12.0 12.0 - 16.0 g/dL Final   08/29/2019 12.3 12.0 - 16.0 g/dL Final     POC Hematocrit   Date Value Ref Range Status   10/07/2019 36 36 - 54 %PCV Final   04/12/2019 33 (L) 36 - 54 %PCV Final   12/08/2018 40 36 - 54 %PCV Final     Hematocrit   Date Value Ref Range Status   10/08/2019 39.1 37.0 - 48.5 % Final   10/07/2019 38.2 37.0 - 48.5 % Final   08/29/2019 40.4 37.0 - 48.5 % Final     Platelets   Date Value Ref Range Status   10/08/2019 180 150 - 350 K/uL Final   10/07/2019 181 150 - 350 K/uL Final   08/29/2019 245 150 - 350 K/uL Final     Glucose   Date Value Ref Range Status   10/08/2019 81 70 - 110 mg/dL Final   10/07/2019 123 (H) 70 - 110 mg/dL Final   10/07/2019 215 (H) 70 - 110 mg/dL Final     Sodium   Date Value Ref Range Status   10/08/2019 138 136 - 145 mmol/L Final   10/07/2019 138 136 - 145 mmol/L Final   10/07/2019 138 136 - 145 mmol/L Final     Potassium   Date Value Ref Range Status   10/08/2019 5.2 (H) 3.5 - 5.1 mmol/L Final   10/07/2019 4.4 3.5 - 5.1 mmol/L Final   10/07/2019 7.3 (HH) 3.5 - 5.1 mmol/L Final     Comment:     *Critical value -   Results called to and read back by:CALLED TO LUIS DANIEL STUART RN.       Chloride    Date Value Ref Range Status   10/08/2019 99 95 - 110 mmol/L Final   10/07/2019 98 95 - 110 mmol/L Final   10/07/2019 99 95 - 110 mmol/L Final     CO2   Date Value Ref Range Status   10/08/2019 26 23 - 29 mmol/L Final   10/07/2019 29 23 - 29 mmol/L Final   10/07/2019 19 (L) 23 - 29 mmol/L Final     BUN, Bld   Date Value Ref Range Status   10/08/2019 48 (H) 8 - 23 mg/dL Final   10/07/2019 43 (H) 8 - 23 mg/dL Final   10/07/2019 106 (H) 8 - 23 mg/dL Final     Creatinine   Date Value Ref Range Status   10/08/2019 5.9 (H) 0.5 - 1.4 mg/dL Final   10/07/2019 5.3 (H) 0.5 - 1.4 mg/dL Final   10/07/2019 9.4 (H) 0.5 - 1.4 mg/dL Final     Calcium   Date Value Ref Range Status   10/08/2019 9.3 8.7 - 10.5 mg/dL Final   10/07/2019 9.0 8.7 - 10.5 mg/dL Final   10/07/2019 9.1 8.7 - 10.5 mg/dL Final     Magnesium   Date Value Ref Range Status   10/08/2019 2.5 1.6 - 2.6 mg/dL Final   07/11/2019 2.4 1.6 - 2.6 mg/dL Final   07/10/2019 2.2 1.6 - 2.6 mg/dL Final     Phosphorus   Date Value Ref Range Status   10/08/2019 5.3 (H) 2.7 - 4.5 mg/dL Final   10/07/2019 4.0 2.7 - 4.5 mg/dL Final   07/23/2019 3.9 2.7 - 4.5 mg/dL Final     Alkaline Phosphatase   Date Value Ref Range Status   10/07/2019 93 55 - 135 U/L Final   08/29/2019 115 55 - 135 U/L Final   07/11/2019 79 55 - 135 U/L Final     ALT   Date Value Ref Range Status   10/07/2019 9 (L) 10 - 44 U/L Final   08/29/2019 9 (L) 10 - 44 U/L Final   07/11/2019 <5 (L) 10 - 44 U/L Final     AST   Date Value Ref Range Status   10/07/2019 17 10 - 40 U/L Final   08/29/2019 12 10 - 40 U/L Final   07/11/2019 7 (L) 10 - 40 U/L Final         Images:  CTH (10/08/2019)    1. 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  There is an additional lacunar infarct, remote, involving the left basal ganglia.  No convincing acute intracranial abnormalities or recent major vascular territory infarct.  No  findings to suggest acute hemorrhage.  2. Mild sinus disease.      Assessment and Plan  Patient is a 60 yo female w/ history of epilepsy and other comorbid conditions that are listed above presents to clinic as a hospital follow up after she was [place] [facility] [place] through seizures x 2.   During her stay, Keppra was adjusted to 100 mg QD w/ additional 500 mg QD on days of HD. Wellbutrin was stopped, as this is known to lower the seizure threshold and patient had her dose doubled prior to admission. Tramadol was d/c'ed from the MAR, although patient denies taking it recently.  Keppra level on admission was 49.8    Problem List Items Addressed This Visit        Neuro    Left hemiparesis    Current Assessment & Plan     - s/p R MCA infarct   - Continue PT         Relevant Orders    Ambulatory consult to Physical Therapy    Ambulatory consult to Physical Therapy    Partial symptomatic epilepsy with complex partial seizures, not intractable, without status epilepticus - Primary    Current Assessment & Plan     - Patient to remain on the following therapy  - Keppra 1000 mg QD   - Keppra 500 mg QD additionally to the daily dose on days of HD ( MWF)  - f/u in 6 months  - Keppra levels prior to next visit          Relevant Orders    Ambulatory consult to Physical Therapy    Ambulatory consult to Physical Therapy    LEFT Hemiplegia as late effect of stroke    Current Assessment & Plan     - Continue PT/OT         History of stroke    Current Assessment & Plan     - R MCA w/ hemorrhagic transformation s/p hemicrani in 2016  - Secondary stroke prevention w/ statin and ASA         Relevant Orders    Ambulatory consult to Physical Therapy    Ambulatory consult to Physical Therapy       Cardiac/Vascular    Hyperlipidemia    Current Assessment & Plan     - Continue atorvastatin as a secondary stroke prevention          Essential hypertension    Current Assessment & Plan     - Continue BP control w/ goal of normotension  -  Stroke RF            Endocrine    Type 2 diabetes mellitus with chronic kidney disease on chronic dialysis, with long-term current use of insulin    Current Assessment & Plan     - Normoglycemia is goal  - Stroke RF  - Management of DM medications per primary team                 Park Gibbs MD  Neurology Resident   [facility]  [address] [place] [address]

## 2019-11-11 ENCOUNTER — TELEPHONE (OUTPATIENT)
Dept: INTERNAL MEDICINE | Facility: CLINIC | Age: 59
End: 2019-11-11

## 2019-11-11 DIAGNOSIS — F32.A DEPRESSION, UNSPECIFIED DEPRESSION TYPE: Primary | ICD-10-CM

## 2019-11-11 DIAGNOSIS — R11.0 NAUSEA: ICD-10-CM

## 2019-11-11 RX ORDER — CITALOPRAM 20 MG/1
20 TABLET, FILM COATED ORAL DAILY
Qty: 30 TABLET | Refills: 4 | Status: SHIPPED | OUTPATIENT
Start: 2019-11-11 | End: 2019-12-23

## 2019-11-11 RX ORDER — ONDANSETRON 4 MG/1
4 TABLET, ORALLY DISINTEGRATING ORAL EVERY 8 HOURS PRN
Qty: 10 TABLET | Refills: 0 | Status: SHIPPED | OUTPATIENT
Start: 2019-11-11

## 2019-11-11 NOTE — TELEPHONE ENCOUNTER
----- Message from Maylin Weiss sent at 11/11/2019  9:33 AM CST -----  Contact: [phone]  Patient would like to get medical advice.  Symptoms (please be specific): nausea    How long has patient had these symptoms:1 day    Pharmacy name and phone # (copy/paste from chart):[facility] [address] [place] [address]   [phone] (Phone)  [phone] (Fax)         Would the patient rather a call back or a response via [facility]?: call back   Comments:  Please advise, thanks

## 2019-11-11 NOTE — TELEPHONE ENCOUNTER
Spoke with Mrs Ayala who c/o nausea and is having recurrent depression off her wellbutrin.  She also requests assistance changing over to [facility] and [facility].  I have erx'd in: Zofran and Celexa.  I have spoken with Jocelyn at [facility]([phone]) who will contact Lupe Gamboa's , who will start the paperwork to have Mrs Ayala transferred to [facility] on [address] in [facility], where [facility], Dr Santo works as she wants to establish with [facility].

## 2019-11-11 NOTE — TELEPHONE ENCOUNTER
Spoke with Ms. Mccray, she has been having really bad nausea and feels terrible. She wants to inform you that Lupe is not working with her in regards to transferring her care to [facility]. Please Advise

## 2019-11-13 ENCOUNTER — TELEPHONE (OUTPATIENT)
Dept: INTERNAL MEDICINE | Facility: CLINIC | Age: 59
End: 2019-11-13

## 2019-11-13 NOTE — TELEPHONE ENCOUNTER
Spoke with pt's , he states that Ms. Mccray is feeling weak and they are not sure why. She feels fine otherwise, no other symptoms. Do you have any suggestions, please advise.

## 2019-11-13 NOTE — TELEPHONE ENCOUNTER
----- Message from Manpreet Heck sent at 11/13/2019 10:40 AM CST -----  Contact: self   Patient would like to get medical advice.  Symptoms (please be specific):  Feeling week   How long has patient had these symptoms:  2 days   Pharmacy name and phone # (copy/paste from chart):  [facility] [address] [place] [address] [phone] (Phone)  [phone] (Fax)  Any drug allergies (copy/paste from chart):      Would the patient rather a call back or a response via [facility]?:  Call back   Comments:

## 2019-11-13 NOTE — PROGRESS NOTES
I have reviewed the history and physical, assessments, and plan, I concur with her/his documentation of Lucy Perez. Patient was seen and examined with the resident.    Denise Lorenzo MD  General Neurology Staff  [facility]

## 2019-11-13 NOTE — TELEPHONE ENCOUNTER
Isaac, pt had dialysis today so probably her BP is low status post such, which will cause fatigue/weakness.She should feel better tomorrow,but if not she/her  should let us know.  Thanks, Dr Shay

## 2019-11-14 ENCOUNTER — TELEPHONE (OUTPATIENT)
Dept: VASCULAR SURGERY | Facility: CLINIC | Age: 59
End: 2019-11-14

## 2019-11-14 NOTE — TELEPHONE ENCOUNTER
----- Message from Sari Mkie sent at 11/14/2019 10:14 AM CST -----  Contact: Pt   Reason: Pt is calling to speak with nurse she states she's in pain feel like someone is sticking her with a needle in her arm.    Communication: [phone] or [phone]

## 2019-11-14 NOTE — TELEPHONE ENCOUNTER
"Spoke wth pt's  who stated" she started having pain  rt arm on Friday and the dialysis nurse wanted her to have an u/s.She is in physiacal thery now and I can have her call you back.Attempted to contact [facility] where pt receives dialysis and no answer.Left a voice message and will try again later.    ----- Message from Sari Mike sent at 11/14/2019 10:14 AM CST -----  Contact: Pt   Reason: Pt is calling to speak with nurse she states she's in pain feel like someone is sticking her with a needle in her arm.    Communication: [phone] or [phone]    "

## 2019-11-18 ENCOUNTER — TELEPHONE (OUTPATIENT)
Dept: VASCULAR SURGERY | Facility: CLINIC | Age: 59
End: 2019-11-18

## 2019-11-18 ENCOUNTER — PATIENT OUTREACH (OUTPATIENT)
Dept: ADMINISTRATIVE | Facility: OTHER | Age: 59
End: 2019-11-18

## 2019-11-18 DIAGNOSIS — Z99.2 ESRD (END STAGE RENAL DISEASE) ON DIALYSIS: Primary | ICD-10-CM

## 2019-11-18 DIAGNOSIS — N18.6 ESRD (END STAGE RENAL DISEASE) ON DIALYSIS: Primary | ICD-10-CM

## 2019-12-02 ENCOUNTER — TELEPHONE (OUTPATIENT)
Dept: INTERNAL MEDICINE | Facility: CLINIC | Age: 59
End: 2019-12-02

## 2019-12-02 NOTE — TELEPHONE ENCOUNTER
Isaac, please call Lucy and let her know that she will have to change her PT days and dialysis days if she wants to establish with [facility]/Dr Santo.Thanks

## 2019-12-02 NOTE — TELEPHONE ENCOUNTER
Spoke with Ms. Perez, she states that she has not been accepted as a patient by Dr. Pastor. The only days she would be able to go for dialysis under Dr. Santo would be Tues, Thurs, and Sat and pt is in PT on Tues/Thurs. Please Advise

## 2019-12-02 NOTE — TELEPHONE ENCOUNTER
----- Message from Mahnaz Evans sent at 12/2/2019  9:36 AM CST -----  Contact: self/  [phone]  Please call patient, she states that the dialysis doctor has not accepted her as a patient yet according to Jimmie , clinic supervisor. please call and advise.

## 2019-12-04 ENCOUNTER — HOSPITAL ENCOUNTER (INPATIENT)
Facility: HOSPITAL | Age: 59
LOS: 5 days | Discharge: HOME | DRG: 628 | End: 2019-12-09
Attending: EMERGENCY MEDICINE | Admitting: HOSPITALIST
Payer: MEDICARE

## 2019-12-04 DIAGNOSIS — I44.0 FIRST DEGREE AV BLOCK: ICD-10-CM

## 2019-12-04 DIAGNOSIS — D64.9 ANEMIA, UNSPECIFIED TYPE: ICD-10-CM

## 2019-12-04 DIAGNOSIS — N18.6 ESRD (END STAGE RENAL DISEASE) ON DIALYSIS: ICD-10-CM

## 2019-12-04 DIAGNOSIS — T82.868A AV GRAFT THROMBOSIS, INITIAL ENCOUNTER: ICD-10-CM

## 2019-12-04 DIAGNOSIS — R73.9 HYPERGLYCEMIA: ICD-10-CM

## 2019-12-04 DIAGNOSIS — E87.5 HYPERKALEMIA: Primary | ICD-10-CM

## 2019-12-04 DIAGNOSIS — Z99.2 ESRD (END STAGE RENAL DISEASE) ON DIALYSIS: ICD-10-CM

## 2019-12-04 DIAGNOSIS — N18.6 ESRD (END STAGE RENAL DISEASE): ICD-10-CM

## 2019-12-04 DIAGNOSIS — Z78.9 PROBLEM WITH VASCULAR ACCESS: ICD-10-CM

## 2019-12-04 DIAGNOSIS — T82.868D THROMBOSIS OF ARTERIOVENOUS GRAFT, SUBSEQUENT ENCOUNTER: ICD-10-CM

## 2019-12-04 DIAGNOSIS — E83.39 HYPERPHOSPHATEMIA: ICD-10-CM

## 2019-12-04 DIAGNOSIS — E83.51 HYPOCALCEMIA: ICD-10-CM

## 2019-12-04 LAB
ALBUMIN SERPL BCP-MCNC: 3.4 G/DL (ref 3.5–5.2)
ALP SERPL-CCNC: 97 U/L (ref 55–135)
ALT SERPL W/O P-5'-P-CCNC: 7 U/L (ref 10–44)
ANION GAP SERPL CALC-SCNC: 21 MMOL/L (ref 8–16)
AST SERPL-CCNC: 7 U/L (ref 10–40)
BASOPHILS # BLD AUTO: 0.04 K/UL (ref 0–0.2)
BASOPHILS NFR BLD: 0.6 % (ref 0–1.9)
BILIRUB SERPL-MCNC: 0.4 MG/DL (ref 0.1–1)
BUN SERPL-MCNC: 111 MG/DL (ref 8–23)
CALCIUM SERPL-MCNC: 7.9 MG/DL (ref 8.7–10.5)
CHLORIDE SERPL-SCNC: 99 MMOL/L (ref 95–110)
CO2 SERPL-SCNC: 25 MMOL/L (ref 23–29)
CREAT SERPL-MCNC: 12.3 MG/DL (ref 0.5–1.4)
DIFFERENTIAL METHOD: ABNORMAL
EOSINOPHIL # BLD AUTO: 0.2 K/UL (ref 0–0.5)
EOSINOPHIL NFR BLD: 2.2 % (ref 0–8)
ERYTHROCYTE [DISTWIDTH] IN BLOOD BY AUTOMATED COUNT: 12.9 % (ref 11.5–14.5)
EST. GFR  (AFRICAN AMERICAN): 3.4 ML/MIN/1.73 M^2
EST. GFR  (NON AFRICAN AMERICAN): 2.9 ML/MIN/1.73 M^2
ESTIMATED AVG GLUCOSE: 177 MG/DL (ref 68–131)
GLUCOSE SERPL-MCNC: 389 MG/DL (ref 70–110)
HAV IGM SERPL QL IA: NEGATIVE
HBA1C MFR BLD HPLC: 7.8 % (ref 4–5.6)
HBV CORE IGM SERPL QL IA: NEGATIVE
HBV SURFACE AG SERPL QL IA: NEGATIVE
HCT VFR BLD AUTO: 27.8 % (ref 37–48.5)
HCV AB SERPL QL IA: NEGATIVE
HGB BLD-MCNC: 8.5 G/DL (ref 12–16)
IMM GRANULOCYTES # BLD AUTO: 0.02 K/UL (ref 0–0.04)
IMM GRANULOCYTES NFR BLD AUTO: 0.3 % (ref 0–0.5)
LYMPHOCYTES # BLD AUTO: 1.1 K/UL (ref 1–4.8)
LYMPHOCYTES NFR BLD: 15.6 % (ref 18–48)
MAGNESIUM SERPL-MCNC: 2.6 MG/DL (ref 1.6–2.6)
MCH RBC QN AUTO: 32.4 PG (ref 27–31)
MCHC RBC AUTO-ENTMCNC: 30.6 G/DL (ref 32–36)
MCV RBC AUTO: 106 FL (ref 82–98)
MONOCYTES # BLD AUTO: 0.5 K/UL (ref 0.3–1)
MONOCYTES NFR BLD: 6.8 % (ref 4–15)
NEUTROPHILS # BLD AUTO: 5.1 K/UL (ref 1.8–7.7)
NEUTROPHILS NFR BLD: 74.5 % (ref 38–73)
NRBC BLD-RTO: 0 /100 WBC
PHOSPHATE SERPL-MCNC: 7.7 MG/DL (ref 2.7–4.5)
PLATELET # BLD AUTO: 205 K/UL (ref 150–350)
PMV BLD AUTO: 10 FL (ref 9.2–12.9)
POCT GLUCOSE: 115 MG/DL (ref 70–110)
POCT GLUCOSE: 159 MG/DL (ref 70–110)
POCT GLUCOSE: 202 MG/DL (ref 70–110)
POCT GLUCOSE: 306 MG/DL (ref 70–110)
POCT GLUCOSE: 403 MG/DL (ref 70–110)
POTASSIUM SERPL-SCNC: 5.8 MMOL/L (ref 3.5–5.1)
PROT SERPL-MCNC: 7.1 G/DL (ref 6–8.4)
RBC # BLD AUTO: 2.62 M/UL (ref 4–5.4)
SODIUM SERPL-SCNC: 145 MMOL/L (ref 136–145)
WBC # BLD AUTO: 6.81 K/UL (ref 3.9–12.7)

## 2019-12-04 PROCEDURE — 99219 PR INITIAL OBSERVATION CARE,LEVL II: ICD-10-PCS | Mod: HCNC,NTX,, | Performed by: PHYSICIAN ASSISTANT

## 2019-12-04 PROCEDURE — 84100 ASSAY OF PHOSPHORUS: CPT | Mod: HCNC,NTX

## 2019-12-04 PROCEDURE — 99222 PR INITIAL HOSPITAL CARE,LEVL II: ICD-10-PCS | Mod: 57,HCNC,NTX, | Performed by: SURGERY

## 2019-12-04 PROCEDURE — 63600175 PHARM REV CODE 636 W HCPCS: Mod: HCNC,NTX | Performed by: EMERGENCY MEDICINE

## 2019-12-04 PROCEDURE — 25000003 PHARM REV CODE 250: Mod: HCNC,NTX | Performed by: PHYSICIAN ASSISTANT

## 2019-12-04 PROCEDURE — 99214 OFFICE O/P EST MOD 30 MIN: CPT | Mod: HCNC,NTX,, | Performed by: NURSE PRACTITIONER

## 2019-12-04 PROCEDURE — 96365 THER/PROPH/DIAG IV INF INIT: CPT

## 2019-12-04 PROCEDURE — 99222 1ST HOSP IP/OBS MODERATE 55: CPT | Mod: 57,HCNC,NTX, | Performed by: SURGERY

## 2019-12-04 PROCEDURE — 99285 EMERGENCY DEPT VISIT HI MDM: CPT | Mod: HCNC,NTX,, | Performed by: EMERGENCY MEDICINE

## 2019-12-04 PROCEDURE — G0378 HOSPITAL OBSERVATION PER HR: HCPCS | Mod: HCNC,NTX

## 2019-12-04 PROCEDURE — 99285 EMERGENCY DEPT VISIT HI MDM: CPT | Mod: 25,HCNC,NTX

## 2019-12-04 PROCEDURE — 99285 PR EMERGENCY DEPT VISIT,LEVEL V: ICD-10-PCS | Mod: HCNC,NTX,, | Performed by: EMERGENCY MEDICINE

## 2019-12-04 PROCEDURE — C9399 UNCLASSIFIED DRUGS OR BIOLOG: HCPCS | Mod: HCNC,NTX | Performed by: PHYSICIAN ASSISTANT

## 2019-12-04 PROCEDURE — 99214 PR OFFICE/OUTPT VISIT, EST, LEVL IV, 30-39 MIN: ICD-10-PCS | Mod: HCNC,NTX,, | Performed by: NURSE PRACTITIONER

## 2019-12-04 PROCEDURE — 96372 THER/PROPH/DIAG INJ SC/IM: CPT | Mod: 59

## 2019-12-04 PROCEDURE — 80074 ACUTE HEPATITIS PANEL: CPT | Mod: HCNC,NTX

## 2019-12-04 PROCEDURE — 83735 ASSAY OF MAGNESIUM: CPT | Mod: HCNC,NTX

## 2019-12-04 PROCEDURE — 63600175 PHARM REV CODE 636 W HCPCS: Mod: HCNC,NTX | Performed by: PHYSICIAN ASSISTANT

## 2019-12-04 PROCEDURE — 80053 COMPREHEN METABOLIC PANEL: CPT | Mod: HCNC,NTX

## 2019-12-04 PROCEDURE — 93010 EKG 12-LEAD: ICD-10-PCS | Mod: HCNC,NTX,, | Performed by: INTERNAL MEDICINE

## 2019-12-04 PROCEDURE — 83036 HEMOGLOBIN GLYCOSYLATED A1C: CPT | Mod: HCNC,NTX

## 2019-12-04 PROCEDURE — 99219 PR INITIAL OBSERVATION CARE,LEVL II: CPT | Mod: HCNC,NTX,, | Performed by: PHYSICIAN ASSISTANT

## 2019-12-04 PROCEDURE — 93010 ELECTROCARDIOGRAM REPORT: CPT | Mod: HCNC,NTX,, | Performed by: INTERNAL MEDICINE

## 2019-12-04 PROCEDURE — 93005 ELECTROCARDIOGRAM TRACING: CPT | Mod: HCNC,NTX

## 2019-12-04 PROCEDURE — 11000001 HC ACUTE MED/SURG PRIVATE ROOM: Mod: HCNC,NTX

## 2019-12-04 PROCEDURE — 85025 COMPLETE CBC W/AUTO DIFF WBC: CPT | Mod: HCNC,NTX

## 2019-12-04 RX ORDER — INSULIN ASPART 100 [IU]/ML
4 INJECTION, SOLUTION INTRAVENOUS; SUBCUTANEOUS
Status: DISCONTINUED | OUTPATIENT
Start: 2019-12-04 | End: 2019-12-05

## 2019-12-04 RX ORDER — FOLIC ACID 1 MG/1
1 TABLET ORAL DAILY
Status: DISCONTINUED | OUTPATIENT
Start: 2019-12-04 | End: 2019-12-09 | Stop reason: HOSPADM

## 2019-12-04 RX ORDER — ATORVASTATIN CALCIUM 20 MG/1
40 TABLET, FILM COATED ORAL DAILY
Status: DISCONTINUED | OUTPATIENT
Start: 2019-12-04 | End: 2019-12-09 | Stop reason: HOSPADM

## 2019-12-04 RX ORDER — ACETAMINOPHEN 325 MG/1
650 TABLET ORAL EVERY 6 HOURS PRN
Status: DISCONTINUED | OUTPATIENT
Start: 2019-12-04 | End: 2019-12-09 | Stop reason: HOSPADM

## 2019-12-04 RX ORDER — LEVETIRACETAM 500 MG/1
1000 TABLET ORAL DAILY
Status: DISCONTINUED | OUTPATIENT
Start: 2019-12-05 | End: 2019-12-09 | Stop reason: HOSPADM

## 2019-12-04 RX ORDER — GLUCAGON 1 MG
1 KIT INJECTION
Status: DISCONTINUED | OUTPATIENT
Start: 2019-12-04 | End: 2019-12-04

## 2019-12-04 RX ORDER — BISACODYL 10 MG
10 SUPPOSITORY, RECTAL RECTAL DAILY PRN
Status: DISCONTINUED | OUTPATIENT
Start: 2019-12-04 | End: 2019-12-09 | Stop reason: HOSPADM

## 2019-12-04 RX ORDER — SEVELAMER CARBONATE 800 MG/1
800 TABLET, FILM COATED ORAL
Status: DISCONTINUED | OUTPATIENT
Start: 2019-12-04 | End: 2019-12-05

## 2019-12-04 RX ORDER — HEPARIN SODIUM 5000 [USP'U]/ML
5000 INJECTION, SOLUTION INTRAVENOUS; SUBCUTANEOUS EVERY 8 HOURS
Status: DISCONTINUED | OUTPATIENT
Start: 2019-12-04 | End: 2019-12-09 | Stop reason: HOSPADM

## 2019-12-04 RX ORDER — IBUPROFEN 200 MG
16 TABLET ORAL
Status: DISCONTINUED | OUTPATIENT
Start: 2019-12-04 | End: 2019-12-04

## 2019-12-04 RX ORDER — TALC
3 POWDER (GRAM) TOPICAL NIGHTLY PRN
Status: DISCONTINUED | OUTPATIENT
Start: 2019-12-04 | End: 2019-12-09 | Stop reason: HOSPADM

## 2019-12-04 RX ORDER — ONDANSETRON 2 MG/ML
4 INJECTION INTRAMUSCULAR; INTRAVENOUS EVERY 8 HOURS PRN
Status: DISCONTINUED | OUTPATIENT
Start: 2019-12-04 | End: 2019-12-09 | Stop reason: HOSPADM

## 2019-12-04 RX ORDER — LEVETIRACETAM 500 MG/1
500 TABLET ORAL ONCE
Status: COMPLETED | OUTPATIENT
Start: 2019-12-04 | End: 2019-12-04

## 2019-12-04 RX ORDER — IPRATROPIUM BROMIDE AND ALBUTEROL SULFATE 2.5; .5 MG/3ML; MG/3ML
3 SOLUTION RESPIRATORY (INHALATION) EVERY 4 HOURS PRN
Status: DISCONTINUED | OUTPATIENT
Start: 2019-12-04 | End: 2019-12-09 | Stop reason: HOSPADM

## 2019-12-04 RX ORDER — ALBUTEROL SULFATE 90 UG/1
2 AEROSOL, METERED RESPIRATORY (INHALATION) EVERY 6 HOURS PRN
Status: DISCONTINUED | OUTPATIENT
Start: 2019-12-04 | End: 2019-12-09 | Stop reason: HOSPADM

## 2019-12-04 RX ORDER — IBUPROFEN 200 MG
24 TABLET ORAL
Status: DISCONTINUED | OUTPATIENT
Start: 2019-12-04 | End: 2019-12-05

## 2019-12-04 RX ORDER — ASPIRIN 81 MG/1
81 TABLET ORAL EVERY MORNING
Status: DISCONTINUED | OUTPATIENT
Start: 2019-12-04 | End: 2019-12-09 | Stop reason: HOSPADM

## 2019-12-04 RX ORDER — INSULIN ASPART 100 [IU]/ML
0-5 INJECTION, SOLUTION INTRAVENOUS; SUBCUTANEOUS
Status: DISCONTINUED | OUTPATIENT
Start: 2019-12-04 | End: 2019-12-05

## 2019-12-04 RX ORDER — SODIUM CHLORIDE 0.9 % (FLUSH) 0.9 %
3 SYRINGE (ML) INJECTION EVERY 8 HOURS
Status: DISCONTINUED | OUTPATIENT
Start: 2019-12-04 | End: 2019-12-09 | Stop reason: HOSPADM

## 2019-12-04 RX ORDER — GLUCAGON 1 MG
1 KIT INJECTION
Status: DISCONTINUED | OUTPATIENT
Start: 2019-12-04 | End: 2019-12-05

## 2019-12-04 RX ORDER — IBUPROFEN 200 MG
16 TABLET ORAL
Status: DISCONTINUED | OUTPATIENT
Start: 2019-12-04 | End: 2019-12-05

## 2019-12-04 RX ORDER — ONDANSETRON 8 MG/1
8 TABLET, ORALLY DISINTEGRATING ORAL EVERY 8 HOURS PRN
Status: DISCONTINUED | OUTPATIENT
Start: 2019-12-04 | End: 2019-12-09 | Stop reason: HOSPADM

## 2019-12-04 RX ORDER — IBUPROFEN 200 MG
24 TABLET ORAL
Status: DISCONTINUED | OUTPATIENT
Start: 2019-12-04 | End: 2019-12-04

## 2019-12-04 RX ADMIN — LEVETIRACETAM 500 MG: 500 TABLET ORAL at 05:12

## 2019-12-04 RX ADMIN — FOLIC ACID 1 MG: 1 TABLET ORAL at 05:12

## 2019-12-04 RX ADMIN — SODIUM POLYSTYRENE SULFONATE 30 G: 15 SUSPENSION ORAL; RECTAL at 05:12

## 2019-12-04 RX ADMIN — ASPIRIN 81 MG: 81 TABLET, COATED ORAL at 05:12

## 2019-12-04 RX ADMIN — ATORVASTATIN CALCIUM 40 MG: 20 TABLET, FILM COATED ORAL at 05:12

## 2019-12-04 RX ADMIN — INSULIN ASPART 4 UNITS: 100 INJECTION, SOLUTION INTRAVENOUS; SUBCUTANEOUS at 04:12

## 2019-12-04 RX ADMIN — CALCIUM GLUCONATE 1 G: 98 INJECTION, SOLUTION INTRAVENOUS at 03:12

## 2019-12-04 RX ADMIN — INSULIN DETEMIR 13 UNITS: 100 INJECTION, SOLUTION SUBCUTANEOUS at 04:12

## 2019-12-04 NOTE — ASSESSMENT & PLAN NOTE
- Last A1c 6.8 in 8/2019; repeat 7.8 today  -  on admit; patient endorses not taking home insulin  - 13U detemir qdaily, aspart 4U TID WM, low dose SSI  - Renal/DM diet  - Monitor BGs and adjust insulin PRN

## 2019-12-04 NOTE — ASSESSMENT & PLAN NOTE
- Chronic and stable  - Patient and  are unsure if taking celexa or prozac at home; daughter to confirm and notify

## 2019-12-04 NOTE — HPI
59 year old female with a PMHx of DM2, ESRD TTHS, CVA with residual L sided hemiparesis, seizures, and depression presenting to the ER with  at bedside for dialysis. Patient's last HD session was Saturday 11/30. She has been transitioning from [facility] to [facility] for outpatient HD. Unfortunately, [facility] has not received all the paperwork needed for her to transition dialysis there. She presents to the ER today to have dialysis. At the time of my exam, patient is resting comfortably in bed with  at bedside. She has no complaints. Denies CP, SOB, abdominal pain, N/V/D, fever/chills, dysuria, palpitations, syncope, headache, paresthesias, and new weakness.      HDS on admission, afebrile without leukocytosis. Labs remarkable for chronic anemia, K 5.8, , and phos 7.7.

## 2019-12-04 NOTE — ED PROVIDER NOTES
Encounter Date: 12/4/2019       History     Chief Complaint   Patient presents with    Needs dialysis     Last treatment was sunday. In process of transferring from [facility] to [facility]   Ms. Perez is a 59 y.o. female with HTN, ESRD (iHD through RUE fistula, last on Saturday), h/o stroke, h/o seizures with residual left sided weakness here today with need for dialysis. Reports she is in the process of transitioning from [facility] to [facility], but it sounds as though her scheduling with [facility] fell through due to lack of necessary labs. Last dialyzed on Saturday. She is simply concerned that she may need it today as she is a few days out and normally goes 3 times weekly. Still makes some urine. Denies fevers, chills, n/v, abd pain, chest pain, SOB, numbness, tingling, weakness, dysuria, palpitations, confusion.     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    Cortical cataract of both eyes 7/26/2016    Decubitus ulcer of buttock, stage 2     Degeneration of lumbar or lumbosacral intervertebral disc 3/5/2013    S/p MRI L-spine 5/2009     Depression     Encounter for blood transfusion     ESRD on hemodialysis     Started August 2018    General anesthetics causing adverse effect in therapeutic use     Hemorrhagic cerebrovascular accident (CVA)     8/2016 s/p Hemicraniotomy at [facility] with Left hemiparesis    History of stroke 6/28/2017    s/p R-MCA stroke with R-putaminal  hemorrhagic transformation in 8/2016 and 11/2016 (s/p [facility]raniot[facility]) with residual L hemiparesis, on AED s/p CVA      Hypertensive retinopathy of both eyes 7/26/2016    Impingement syndrome of right shoulder 7/2/2014    Obesity     THERESA (obstructive sleep apnea) 3/5/2013    No Home CPAP 2ndary to cost     Partial symptomatic epilepsy with complex partial seizures, not intractable, without status epilepticus     Rheumatoid arthritis(714.0)     Rotator cuff tear 7/2/2014    Sarcoidosis     Stroke 2016    left sided flaccidity, SAH    Vertebral artery stenosis 3/5/2013    S/p Stenting per Dr Burnett      Past Surgical History:   Procedure Laterality Date    BREAST SURGERY      breast reduction    COLONOSCOPY N/A 8/11/2016    Procedure: COLONOSCOPY;  Surgeon: Jerry Vilchis MD;  Location: [facility] ENDO (4TH FLR);  Service: Endoscopy;  Laterality: N/A;  Patient reports difficulty awaking from anesthesia in the past.    DECLOTTING OF VASCULAR GRAFT Right 6/20/2019    Procedure: DECLOT-GRAFT;  Surgeon: Cabrera Irwin MD;  Location: [facility] CATH LAB;  Service: Cardiology;  Laterality: Right;    FISTULOGRAM Right 2/11/2019    Procedure: Fistulogram;  Surgeon: Meir Valencia MD;  Location: [facility] CATH LAB;  Service: Peripheral Vascular;  Laterality: Right;    FISTULOGRAM Right 7/8/2019    Procedure: Fistulogram;  Surgeon: WELLINGTON Palm III, MD;  Location: [facility] CATH LAB;  Service: Peripheral Vascular;  Laterality: Right;    HYSTERECTOMY  1999    JOSE LUIS/BSO (AUB)    PLACEMENT OF ARTERIOVENOUS GRAFT Right 10/18/2018    Procedure: AV GRAFT CREATION;  Surgeon: Meir Valencia MD;  Location: [facility] OR 2ND FLR;  Service: Cardiovascular;  Laterality: Right;    ROTATOR CUFF REPAIR Right July 9, 2014    right side    Skull surgery      Aneurysm    stent placed      in vertebral artery    TOTAL REDUCTION MAMMOPLASTY      TUBAL LIGATION       Family History   Problem Relation Age of Onset    Diabetes Mother   "   Hypertension Mother     Heart disease Mother     Heart attack Mother     Breast cancer Mother     Stroke Sister     Hypertension Sister     Sleep apnea Sister     No Known Problems Daughter     Diabetes Son     Bell's palsy Sister     Lupus Sister     Blindness Maternal Grandmother     Diabetes Unknown         "My entire family family has diabetes"    Stroke Maternal Aunt     Colon cancer Neg Hx     Ovarian cancer Neg Hx      Social History     Tobacco Use    Smoking status: Never Smoker    Smokeless tobacco: Never Used   Substance Use Topics    Alcohol use: Yes     Comment: occasional wine cooler     Drug use: Never     Review of Systems   Constitutional: Negative for fatigue and fever.   HENT: Negative for sore throat.    Respiratory: Negative for cough and shortness of breath.    Cardiovascular: Negative for chest pain, palpitations and leg swelling.   Gastrointestinal: Negative for abdominal distention, abdominal pain, nausea and vomiting.   Genitourinary: Negative for difficulty urinating and dysuria.        +ESRD   Musculoskeletal: Negative for back pain.   Skin: Negative for rash.   Neurological: Negative for weakness.   Hematological: Does not bruise/bleed easily.       Physical Exam     Initial Vitals [12/04/19 1034]   BP Pulse Resp Temp SpO2   (!) 141/82 76 18 98.5 °F (36.9 °C) 95 %      MAP       --         Physical Exam    Nursing note and vitals reviewed.  Constitutional: She appears well-developed and well-nourished. She is not diaphoretic. No distress.   HENT:   Head: Normocephalic and atraumatic.   Right Ear: External ear normal.   Left Ear: External ear normal.   Neck: Neck supple.   Cardiovascular: Normal rate, regular rhythm, normal heart sounds and intact distal pulses.   Pulmonary/Chest: Breath sounds normal. No respiratory distress. She has no wheezes. She has no rhonchi. She has no rales. She exhibits no tenderness.   Abdominal: Soft. She exhibits no distension. There is " no tenderness. There is no rebound and no guarding.   Musculoskeletal: She exhibits no edema.   Contracture to LUE. RUE fistula in place with palpable thrill.   Neurological: She is alert and oriented to person, place, and time. GCS score is 15. GCS eye subscore is 4. GCS verbal subscore is 5. GCS motor subscore is 6.   Skin: Skin is warm. Capillary refill takes less than 2 seconds. No rash noted.   Psychiatric: She has a normal mood and affect.         ED Course   Procedures  Labs Reviewed   CBC W/ AUTO DIFFERENTIAL - Abnormal; Notable for the following components:       Result Value    RBC 2.62 (*)     Hemoglobin 8.5 (*)     Hematocrit 27.8 (*)     Mean Corpuscular Volume 106 (*)     Mean Corpuscular Hemoglobin 32.4 (*)     Mean Corpuscular Hemoglobin Conc 30.6 (*)     Gran% 74.5 (*)     Lymph% 15.6 (*)     All other components within normal limits   COMPREHENSIVE METABOLIC PANEL - Abnormal; Notable for the following components:    Potassium 5.8 (*)     Glucose 389 (*)     BUN, Bld 111 (*)     Creatinine 12.3 (*)     Calcium 7.9 (*)     Albumin 3.4 (*)     AST 7 (*)     ALT 7 (*)     Anion Gap 21 (*)     eGFR if  3.4 (*)     eGFR if non  2.9 (*)     All other components within normal limits   PHOSPHORUS - Abnormal; Notable for the following components:    Phosphorus 7.7 (*)     All other components within normal limits   MAGNESIUM   HEPATITIS PANEL, ACUTE   POCT GLUCOSE MONITORING CONTINUOUS     EKG Readings: (Independently Interpreted)   Normal sinus rhythm rate 73.  AL interval 200, thus first-degree AV block.  No QRS or T-wave changes.  No STEMI.       Imaging Results    None          Medical Decision Making:   History:   I obtained history from: someone other than patient.       <> Summary of History: Has been acting normally per family member present.  Old Medical Records: I decided to obtain old medical records.  Old Records Summarized: records from clinic visits.       <>  Summary of Records: Followed here by vascular surgery, neurology, internal medicine, and nephrology  Independently Interpreted Test(s):   I have ordered and independently interpreted EKG Reading(s) - see prior notes  Clinical Tests:   Lab Tests: Ordered and Reviewed  Medical Tests: Ordered and Reviewed  ED Management:  Vitals normal. Afebrile. Here w/ concern for need for dialysis. Clinically appears well. No overt hypervolemia or AMS on exam. Will check CBC, CMP, Mg, Phos, and EKG. Reportedly needs hepatitis panel to for clearance by [facility]; lab sent.    Labs reviewed.  Significant for K 5.8, phosphorus 7.7, creatinine 12.3 with GFR 3.9, calcium 7.9, hemoglobin 8.5.  EKG reassuring.  In the setting of hypercalcemia with hyperkalemia, 1 g of calcium gluconate given.    Patient likely needs dialysis to clear potassium.  As she does not have this set up as an outpatient, she is high risk for discharge home.  Discussed with hospital medicine who admitted patient.  Other:   I have discussed this case with another health care provider.       <> Summary of the Discussion: Hospital medicine                                 Clinical Impression:       ICD-10-CM ICD-9-CM   1. Hyperkalemia E87.5 276.7   2. ESRD (end stage renal disease) N18.6 585.6   3. Hyperphosphatemia E83.39 275.3   4. Hypocalcemia E83.51 275.41   5. Anemia, unspecified type D64.9 285.9   6. Hyperglycemia R73.9 790.29   7. First degree AV block I44.0 426.11         Disposition:   Disposition: Placed in Observation  Condition: Stable                     Jerome Curry MD  12/04/19 3486

## 2019-12-04 NOTE — H&P
[facility] Hospital Medicine  History & Physical    Patient Name: Lucy Perez  MRN: [id number]  Admission Date: 12/4/2019  Attending Physician: Angelo Veronica MD   Primary Care Provider: Beverly Muniz MD    [facility] Medicine Team: [facility] HOSP MED E Anayeli Goins PA-C     Patient information was obtained from patient, spouse/SO and ER records.     Subjective:     Principal Problem:Hyperkalemia    Chief Complaint:   Chief Complaint   Patient presents with    Needs dialysis     Last treatment was sunday. In process of transferring from [facility] to [facility]        HPI: 59 year old female with a PMHx of DM2, ESRD TTHS, CVA with residual L sided hemiparesis, seizures, and depression presenting to the ER with  at bedside for dialysis. Patient's last HD session was Saturday 11/30. She has been transitioning from [facility] to [facility] for outpatient HD. Unfortunately, [facility] has not received all the paperwork needed for her to transition dialysis there. She presents to the ER today to have dialysis. At the time of my exam, patient is resting comfortably in bed with  at bedside. She has no complaints. Denies CP, SOB, abdominal pain, N/V/D, fever/chills, dysuria, palpitations, syncope, headache, paresthesias, and new weakness.      HDS on admission, afebrile without leukocytosis. Labs remarkable for chronic anemia, K 5.8, , and phos 7.7.     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    Cortical cataract of both eyes 7/26/2016    Decubitus ulcer of buttock, stage 2     Degeneration of lumbar or lumbosacral intervertebral disc 3/5/2013    S/p  MRI L-spine 5/2009     Depression     Encounter for blood transfusion     ESRD on hemodialysis     Started August 2018    General anesthetics causing adverse effect in therapeutic use     Hemorrhagic cerebrovascular accident (CVA)     8/2016 s/p Hemicraniotomy at [facility] with Left hemiparesis    History of stroke 6/28/2017    s/p R-MCA stroke with R-putaminal hemorrhagic transformation in 8/2016 and 11/2016 (s/p hemicraniotomy at [facility]) with residual L hemiparesis, on AED s/p CVA      Hypertensive retinopathy of both eyes 7/26/2016    Impingement syndrome of right shoulder 7/2/2014    Obesity     THERESA (obstructive sleep apnea) 3/5/2013    No Home CPAP 2ndary to cost     Partial symptomatic epilepsy with complex partial seizures, not intractable, without status epilepticus     Rheumatoid arthritis(714.0)     Rotator cuff tear 7/2/2014    Sarcoidosis     Stroke 2016    left sided flaccidity, SAH    Vertebral artery stenosis 3/5/2013    S/p Stenting per Dr Burnett        Past Surgical History:   Procedure Laterality Date    BREAST SURGERY      breast reduction    COLONOSCOPY N/A 8/11/2016    Procedure: COLONOSCOPY;  Surgeon: Jerry Vilchis MD;  [facility]  Service: Endoscopy;  Laterality: N/A;  Patient reports difficulty awaking from anesthesia in the past.    DECLOTTING OF VASCULAR GRAFT Right 6/20/2019    Procedure: DECLOT-GRAFT;  Surgeon: Cabrera Irwin MD;  Location: [facility] CATH LAB;  Service: Cardiology;  Laterality: Right;    FISTULOGRAM Right 2/11/2019    Procedure: Fistulogram;  Surgeon: Meir Valencia MD;  Location: [facility] CATH LAB;  Service: Peripheral Vascular;  Laterality: Right;    FISTULOGRAM Right 7/8/2019    Procedure: Fistulogram;  Surgeon: WELLINGTON Palm III, MD;  Location: [facility] CATH LAB;  Service: Peripheral Vascular;  Laterality: Right;    HYSTERECTOMY  1999    JOSE LUIS/BSO (AUB)    PLACEMENT OF ARTERIOVENOUS GRAFT Right 10/18/2018    Procedure: AV GRAFT CREATION;  " Surgeon: Meir Valencia MD;  Location: [facility] OR [address] [place] [address];  Service: Cardiovascular;  Laterality: Right;    ROTATOR CUFF REPAIR Right July 9, 2014    right side    Skull surgery      Aneurysm    stent placed      in vertebral artery    TOTAL REDUCTION MAMMOPLASTY      TUBAL LIGATION         Review of patient's allergies indicates:   Allergen Reactions    Lisinopril Other (See Comments)     Angioedema      Vicodin [hydrocodone-acetaminophen] Rash     No problem with acetaminophen        No current facility-administered medications on file prior to encounter.      Current Outpatient Medications on File Prior to Encounter   Medication Sig    acetaminophen (TYLENOL) 325 MG tablet Take 2 tablets (650 mg total) by mouth every 6 (six) hours as needed for Pain.    albuterol (VENTOLIN HFA) 90 mcg/actuation inhaler Inhale 2 puffs into the lungs every 6 (six) hours as needed for Wheezing. Rescue    aspirin (ECOTRIN) 81 MG EC tablet Take 81 mg by mouth every morning.     atorvastatin (LIPITOR) 40 MG tablet TAKE 1 TABLET ONE TIME DAILY FOR CHOLESTEROL    BD INSULIN PEN NEEDLE UF SHORT 31 gauge x 5/16" Ndle USE TO INJECT NOVOLOG FLEXPEN BEFORE MEALS    bisacodyl (DULCOLAX) 10 mg Supp Place 1 suppository (10 mg total) rectally daily as needed.    blood sugar diagnostic Strp To check BG 4  times daily, to use with insurance preferred meter-true metrix    blood-glucose meter kit To check BG 4 times daily, to use with insurance preferred meter-true metrix    citalopram (CELEXA) 20 MG tablet Take 1 tablet (20 mg total) by mouth once daily. 1 tab daily for depression    ergocalciferol (ERGOCALCIFEROL) 50,000 unit Cap Take 1 capsule (50,000 Units total) by mouth every 7 days.    famotidine (PEPCID) 20 MG tablet Take 1 tablet (20 mg total) by mouth once daily.    FLUoxetine 10 MG capsule Take 10 mg by mouth once daily.    FLUoxetine 10 MG Tab Take 1 tablet (10 mg total) by mouth once daily.    glipiZIDE (GLUCOTROL) " 2.5 MG TR24 Take 1 tablet (hold if less than 150) at lunch (non dialysis days)    insulin aspart U-100 (NOVOLOG U-100 INSULIN ASPART) 100 unit/mL injection Use correction scale 180-230+1, 231-280+2, 281-330+3, 331-380+4, >380+5, max 15 units.    insulin glargine (LANTUS U-100 INSULIN) 100 unit/mL injection Inject 10-12 Units into the skin every evening.    lancets Misc 1 lancet by Misc.(Non-Drug; Combo Route) route 2 (two) times daily with meals. Check glucose before meals and bed    levETIRAcetam (KEPPRA) 1000 MG tablet Take 1 tablet (1,000 mg total) by mouth once daily.    levETIRAcetam (KEPPRA) 500 MG Tab Take 1 tablet (500 mg total) by mouth every Mon, Wed, Fri. Monday Wednesday and Friday after DIALYSIS take additional 500mg    ondansetron (ZOFRAN-ODT) 4 MG TbDL Take 1 tablet (4 mg total) by mouth every 8 (eight) hours as needed.    polyethylene glycol (MIRALAX) 17 gram/dose powder Take 17 g by mouth 2 (two) times daily.    pregabalin (LYRICA) 25 MG capsule Take 1 capsule (25 mg total) by mouth once daily. May take additional dose after HD. (Patient taking differently: Take 25 mg by mouth daily as needed. May take additional dose after HD.)    sevelamer carbonate (RENVELA) 800 mg Tab Take 800 mg by mouth 3 (three) times daily with meals.    [DISCONTINUED] ALPRAZolam (XANAX) 0.5 MG tablet 1 tab Every 8 hours as needed for anxiety    folic acid (FOLVITE) 1 MG tablet Take 1 tablet (1 mg total) by mouth once daily.     Family History     Problem Relation (Age of Onset)    Bell's palsy Sister    Blindness Maternal Grandmother    Breast cancer Mother    Diabetes Mother, Son,     Heart attack Mother    Heart disease Mother    Hypertension Mother, Sister    Lupus Sister    No Known Problems Daughter    Sleep apnea Sister    Stroke Sister, Maternal Aunt        Tobacco Use    Smoking status: Never Smoker    Smokeless tobacco: Never Used   Substance and Sexual Activity    Alcohol use: Yes     Comment:  occasional wine cooler     Drug use: Never    Sexual activity: Yes     Partners: Male     Birth control/protection: Post-menopausal     Review of Systems   Constitutional: Negative for chills, diaphoresis, fatigue and fever.   HENT: Negative for congestion, sinus pressure, sore throat and trouble swallowing.    Respiratory: Negative for cough, shortness of breath and wheezing.    Cardiovascular: Negative for chest pain, palpitations and leg swelling.   Gastrointestinal: Negative for abdominal distention, abdominal pain, diarrhea, nausea and vomiting.   Genitourinary: Positive for decreased urine volume (ESRD). Negative for difficulty urinating, dysuria, flank pain, frequency and hematuria.   Musculoskeletal: Positive for gait problem (wheelchair bound since CVA). Negative for back pain, myalgias and neck pain.   Skin: Negative for rash and wound.   Neurological: Positive for weakness (chronic L sided weakness s/p CVA). Negative for dizziness, seizures, syncope and headaches.   Psychiatric/Behavioral: Negative for agitation, behavioral problems and dysphoric mood. The patient is not nervous/anxious.      Objective:     Vital Signs (Most Recent):  Temp: 98.5 °F (36.9 °C) (12/04/19 1034)  Pulse: 78 (12/04/19 1459)  Resp: 16 (12/04/19 1459)  BP: (!) 143/67 (12/04/19 1458)  SpO2: 96 % (12/04/19 1459) Vital Signs (24h Range):  Temp:  [98.5 °F (36.9 °C)] 98.5 °F (36.9 °C)  Pulse:  [72-78] 78  Resp:  [16-18] 16  SpO2:  [93 %-97 %] 96 %  BP: (127-151)/(62-82) 143/67     Weight: 86 kg (189 lb 9.5 oz)  Body mass index is 33.59 kg/m².    Physical Exam   Constitutional: She is oriented to person, place, and time. She appears well-developed and well-nourished. No distress.   HENT:   Head: Normocephalic and atraumatic.   Eyes: Conjunctivae and EOM are normal. Right eye exhibits no discharge. Left eye exhibits no discharge. No scleral icterus.   Neck: Normal range of motion. Neck supple. No tracheal deviation present.    Cardiovascular: Normal rate, regular rhythm, normal heart sounds and intact distal pulses.   Pulmonary/Chest: Effort normal and breath sounds normal. No respiratory distress. She has no wheezes.   Abdominal: Soft. Bowel sounds are normal. She exhibits no distension. There is no tenderness.   Musculoskeletal: Normal range of motion. She exhibits no edema or tenderness.   Neurological: She is alert and oriented to person, place, and time.   Chronic L sided hemiparesis   Skin: Skin is warm and dry. She is not diaphoretic. No erythema.   RUE AVG   Psychiatric: She has a normal mood and affect. Her behavior is normal.         CRANIAL NERVES     CN III, IV, VI   Extraocular motions are normal.        Significant Labs: All pertinent labs within the past 24 hours have been reviewed.    Significant Imaging: I have reviewed all pertinent imaging results/findings within the past 24 hours.    Assessment/Plan:     * Hyperkalemia  ESRD TTHS (last HD 11/30)  Anemia in ESRD  Acute on chronic HFpEF   - HDS on admission, stable on RA  - Labs remarkable for mild hyperkalemia 5.8, phos 7.7  - Nephrology consulted for maintenance HD  - AVG to RUE without bruit or thrill; vascular surg consulted, appreciate assistance  - Will give kayexalate x1  - Renal/DM diet; NPO at MN as precaution  - Strict I/Os, daily weights  *CM/SW to fax additional paperwork to [facility]     Type 2 diabetes mellitus with chronic kidney disease on chronic dialysis, with long-term current use of insulin  - Last A1c 6.8 in 8/2019; repeat 7.8 today  -  on admit; patient endorses not taking home insulin  - 13U detemir qdaily, aspart 4U TID WM, low dose SSI  - Renal/DM diet  - Monitor BGs and adjust insulin PRN    History of stroke  L sided hemiparesis  HLD  Wheelchair bound  - Continue ASA and lipitor    Partial symptomatic epilepsy with complex partial seizures, not intractable, without status epilepticus  - No recent seizure activity  - Patient reports  taking keppra 500 mg BID on HD days and 100 mg daily on other days  - Will continue    Mixed anxiety and depressive disorder  - Chronic and stable  - Patient and  are unsure if taking celexa or prozac at home; daughter to confirm and notify      VTE Risk Mitigation (From admission, onward)         Ordered     heparin (porcine) injection 5,000 Units  Every 8 hours      12/04/19 1501     Place sequential compression device  Until discontinued      12/04/19 1501     Place LUIS hose  Until discontinued      12/04/19 1501     IP VTE HIGH RISK PATIENT  Once      12/04/19 1501                   Anayeli Goins PA-C  Department of Hospital Medicine   [facility]

## 2019-12-04 NOTE — ED NOTES
Anayeli Goins requesting insulin administration at this time. Swathi COLLINS informed pt still awaiting admin.

## 2019-12-04 NOTE — ASSESSMENT & PLAN NOTE
- No recent seizure activity  - Patient reports taking keppra 500 mg BID on HD days and 100 mg daily on other days  - Will continue

## 2019-12-04 NOTE — HPI
Ms Perez is a 60yo female with HTN, ESRD (iHD through RUE fistula, last on Saturday), h/o stroke, h/o seizures with residual left sided weakness here today with need for dialysis given clotted RUE AVG. Vascular surgery was consulted for assistance with access.    Patient states she last dialyzed 11/30, no issues with dialysis then. She has had some bleeding after dialysis. She had some mixup with hepatitis B as she was trying to transfer to a different dialysis center on Monday so she was unable to dialyze. She denies chest pain, shortness of breath. She came to the ED because she was hoping they could help with getting dialysis.

## 2019-12-04 NOTE — SUBJECTIVE & OBJECTIVE
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    Cortical cataract of both eyes 7/26/2016    Decubitus ulcer of buttock, stage 2     Degeneration of lumbar or lumbosacral intervertebral disc 3/5/2013    S/p MRI L-spine 5/2009     Depression     Encounter for blood transfusion     ESRD on hemodialysis     Started August 2018    General anesthetics causing adverse effect in therapeutic use     Hemorrhagic cerebrovascular accident (CVA)     8/2016 s/p Hemicraniotomy at [facility] [place] with Left hemiparesis    History of stroke 6/28/2017    s/p R-MCA stroke with R-putaminal hemorrhagic transformation in 8/2016 and 11/2016 (s/p hemicraniotomy at [facility]) with residual L hemiparesis, on AED s/p CVA      Hypertensive retinopathy of both eyes 7/26/2016    Impingement syndrome of right shoulder 7/2/2014    Obesity     THERESA (obstructive sleep apnea) 3/5/2013    No Home CPAP 2ndary to cost     Partial symptomatic epilepsy with complex partial seizures, not intractable, without status epilepticus     Rheumatoid arthritis(714.0)     Rotator cuff tear 7/2/2014    Sarcoidosis     Stroke 2016    left sided flaccidity, SAH    Vertebral artery stenosis 3/5/2013    S/p Stenting per Dr Burnett        Past Surgical History:   Procedure Laterality Date    BREAST SURGERY      breast reduction    COLONOSCOPY N/A 8/11/2016    Procedure: COLONOSCOPY;  Surgeon: Jerry Vilchis MD;  Location: [address]);  Service: Endoscopy;  Laterality: N/A;  Patient reports difficulty awaking from anesthesia in the past.    DECLOTTING OF VASCULAR GRAFT Right 6/20/2019    Procedure: DECLOT-GRAFT;  Surgeon: Cabrera IBARRA  "MD Alida;  Location: [facility] CATH LAB;  Service: Cardiology;  Laterality: Right;    FISTULOGRAM Right 2/11/2019    Procedure: Fistulogram;  Surgeon: Meir Valencia MD;  Location: [facility] CATH LAB;  Service: Peripheral Vascular;  Laterality: Right;    FISTULOGRAM Right 7/8/2019    Procedure: Fistulogram;  Surgeon: WELLINGTON Palm III, MD;  Location: [facility] CATH LAB;  Service: Peripheral Vascular;  Laterality: Right;    HYSTERECTOMY  1999    JOSE LUIS/BSO (AUB)    PLACEMENT OF ARTERIOVENOUS GRAFT Right 10/18/2018    Procedure: AV GRAFT CREATION;  Surgeon: Meir Valencia MD;  Location: [facility] OR [facility] FLR;  Service: Cardiovascular;  Laterality: Right;    ROTATOR CUFF REPAIR Right July 9, 2014    right side    Skull surgery      Aneurysm    stent placed      in vertebral artery    TOTAL REDUCTION MAMMOPLASTY      TUBAL LIGATION         Review of patient's allergies indicates:   Allergen Reactions    Lisinopril Other (See Comments)     Angioedema      Vicodin [hydrocodone-acetaminophen] Rash     No problem with acetaminophen        No current facility-administered medications on file prior to encounter.      Current Outpatient Medications on File Prior to Encounter   Medication Sig    acetaminophen (TYLENOL) 325 MG tablet Take 2 tablets (650 mg total) by mouth every 6 (six) hours as needed for Pain.    albuterol (VENTOLIN HFA) 90 mcg/actuation inhaler Inhale 2 puffs into the lungs every 6 (six) hours as needed for Wheezing. Rescue    aspirin (ECOTRIN) 81 MG EC tablet Take 81 mg by mouth every morning.     atorvastatin (LIPITOR) 40 MG tablet TAKE 1 TABLET ONE TIME DAILY FOR CHOLESTEROL    BD INSULIN PEN NEEDLE UF SHORT 31 gauge x 5/16" Ndle USE TO INJECT NOVOLOG FLEXPEN BEFORE MEALS    bisacodyl (DULCOLAX) 10 mg Supp Place 1 suppository (10 mg total) rectally daily as needed.    blood sugar diagnostic Strp To check BG 4  times daily, to use with insurance preferred meter-true metrix    blood-glucose meter kit To " check BG 4 times daily, to use with insurance preferred meter-true metrix    citalopram (CELEXA) 20 MG tablet Take 1 tablet (20 mg total) by mouth once daily. 1 tab daily for depression    ergocalciferol (ERGOCALCIFEROL) 50,000 unit Cap Take 1 capsule (50,000 Units total) by mouth every 7 days.    famotidine (PEPCID) 20 MG tablet Take 1 tablet (20 mg total) by mouth once daily.    FLUoxetine 10 MG capsule Take 10 mg by mouth once daily.    FLUoxetine 10 MG Tab Take 1 tablet (10 mg total) by mouth once daily.    glipiZIDE (GLUCOTROL) 2.5 MG TR24 Take 1 tablet (hold if less than 150) at lunch (non dialysis days)    insulin aspart U-100 (NOVOLOG U-100 INSULIN ASPART) 100 unit/mL injection Use correction scale 180-230+1, 231-280+2, 281-330+3, 331-380+4, >380+5, max 15 units.    insulin glargine (LANTUS U-100 INSULIN) 100 unit/mL injection Inject 10-12 Units into the skin every evening.    lancets Misc 1 lancet by Misc.(Non-Drug; Combo Route) route 2 (two) times daily with meals. Check glucose before meals and bed    levETIRAcetam (KEPPRA) 1000 MG tablet Take 1 tablet (1,000 mg total) by mouth once daily.    levETIRAcetam (KEPPRA) 500 MG Tab Take 1 tablet (500 mg total) by mouth every Mon, Wed, Fri. Monday Wednesday and Friday after DIALYSIS take additional 500mg    ondansetron (ZOFRAN-ODT) 4 MG TbDL Take 1 tablet (4 mg total) by mouth every 8 (eight) hours as needed.    polyethylene glycol (MIRALAX) 17 gram/dose powder Take 17 g by mouth 2 (two) times daily.    pregabalin (LYRICA) 25 MG capsule Take 1 capsule (25 mg total) by mouth once daily. May take additional dose after HD. (Patient taking differently: Take 25 mg by mouth daily as needed. May take additional dose after HD.)    sevelamer carbonate (RENVELA) 800 mg Tab Take 800 mg by mouth 3 (three) times daily with meals.    [DISCONTINUED] ALPRAZolam (XANAX) 0.5 MG tablet 1 tab Every 8 hours as needed for anxiety    folic acid (FOLVITE) 1 MG tablet  Take 1 tablet (1 mg total) by mouth once daily.     Family History     Problem Relation (Age of Onset)    Bell's palsy Sister    Blindness Maternal Grandmother    Breast cancer Mother    Diabetes Mother, Son,     Heart attack Mother    Heart disease Mother    Hypertension Mother, Sister    Lupus Sister    No Known Problems Daughter    Sleep apnea Sister    Stroke Sister, Maternal Aunt        Tobacco Use    Smoking status: Never Smoker    Smokeless tobacco: Never Used   Substance and Sexual Activity    Alcohol use: Yes     Comment: occasional wine cooler     Drug use: Never    Sexual activity: Yes     Partners: Male     Birth control/protection: Post-menopausal     Review of Systems   Constitutional: Negative for chills, diaphoresis, fatigue and fever.   HENT: Negative for congestion, sinus pressure, sore throat and trouble swallowing.    Respiratory: Negative for cough, shortness of breath and wheezing.    Cardiovascular: Negative for chest pain, palpitations and leg swelling.   Gastrointestinal: Negative for abdominal distention, abdominal pain, diarrhea, nausea and vomiting.   Genitourinary: Positive for decreased urine volume (ESRD). Negative for difficulty urinating, dysuria, flank pain, frequency and hematuria.   Musculoskeletal: Positive for gait problem (wheelchair bound since CVA). Negative for back pain, myalgias and neck pain.   Skin: Negative for rash and wound.   Neurological: Positive for weakness (chronic L sided weakness s/p CVA). Negative for dizziness, seizures, syncope and headaches.   Psychiatric/Behavioral: Negative for agitation, behavioral problems and dysphoric mood. The patient is not nervous/anxious.      Objective:     Vital Signs (Most Recent):  Temp: 98.5 °F (36.9 °C) (12/04/19 1034)  Pulse: 78 (12/04/19 1459)  Resp: 16 (12/04/19 1459)  BP: (!) 143/67 (12/04/19 1458)  SpO2: 96 % (12/04/19 1459) Vital Signs (24h Range):  Temp:  [98.5 °F (36.9 °C)] 98.5 °F (36.9 °C)  Pulse:  [72-78]  78  Resp:  [16-18] 16  SpO2:  [93 %-97 %] 96 %  BP: (127-151)/(62-82) 143/67     Weight: 86 kg (189 lb 9.5 oz)  Body mass index is 33.59 kg/m².    Physical Exam   Constitutional: She is oriented to person, place, and time. She appears well-developed and well-nourished. No distress.   HENT:   Head: Normocephalic and atraumatic.   Eyes: Conjunctivae and EOM are normal. Right eye exhibits no discharge. Left eye exhibits no discharge. No scleral icterus.   Neck: Normal range of motion. Neck supple. No tracheal deviation present.   Cardiovascular: Normal rate, regular rhythm, normal heart sounds and intact distal pulses.   Pulmonary/Chest: Effort normal and breath sounds normal. No respiratory distress. She has no wheezes.   Abdominal: Soft. Bowel sounds are normal. She exhibits no distension. There is no tenderness.   Musculoskeletal: Normal range of motion. She exhibits no edema or tenderness.   Neurological: She is alert and oriented to person, place, and time.   Chronic L sided hemiparesis   Skin: Skin is warm and dry. She is not diaphoretic. No erythema.   RUE AVG   Psychiatric: She has a normal mood and affect. Her behavior is normal.         CRANIAL NERVES     CN III, IV, VI   Extraocular motions are normal.        Significant Labs: All pertinent labs within the past 24 hours have been reviewed.    Significant Imaging: I have reviewed all pertinent imaging results/findings within the past 24 hours.

## 2019-12-04 NOTE — ED NOTES
Unable to admit order meds due to pharmacy verification delay. Pt moved to admh 03. Swathi informed patient still needs insulin administration.

## 2019-12-04 NOTE — ED TRIAGE NOTES
Lucy Perez, a 59 y.o. female presents to the ED w/ complaint of switching dialysis companies and sent to ED for  Today's HD. MWF HD normally. Pt denies CP, SOB, edema, NVD, fevers, chills, sweats. Pt reports dizziness.    Triage note:  Chief Complaint   Patient presents with    Needs dialysis     Last treatment was sunday. In process of transferring from [facility] to [facility]     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    Cortical cataract of both eyes 7/26/2016    Decubitus ulcer of buttock, stage 2     Degeneration of lumbar or lumbosacral intervertebral disc 3/5/2013    S/p MRI L-spine 5/2009     Depression     Encounter for blood transfusion     ESRD on hemodialysis     Started August 2018    General anesthetics causing adverse effect in therapeutic use     Hemorrhagic cerebrovascular accident (CVA)     8/2016 s/p Hemicraniotomy at [facility] [place] with Left hemiparesis    History of stroke 6/28/2017    s/p R-MCA stroke with R-putaminal hemorrhagic transformation in 8/2016 and 11/2016 (s/p hemicraniotomy at [facility]) with residual L hemiparesis, on AED s/p CVA      Hypertensive retinopathy of both eyes 7/26/2016    Impingement syndrome of right shoulder 7/2/2014    Obesity     THERESA (obstructive sleep apnea) 3/5/2013    No Home CPAP 2ndary to cost     Partial symptomatic epilepsy with complex partial seizures, not intractable, without status epilepticus     Rheumatoid  arthritis(714.0)     Rotator cuff tear 7/2/2014    Sarcoidosis     Stroke 2016    left sided flaccidity, SAH    Vertebral artery stenosis 3/5/2013    S/p Stenting per Dr Burnett      Adult Physical Assessment  LOC: Lucy Perez, 59 y.o. female verified via two identifiers.  The patient is awake, alert, oriented and speaking appropriately at this time. Dizziness  APPEARANCE: Patient resting comfortably and appears to be in no acute distress at this time. Patient is clean and well groomed, patient's clothing is properly fastened.  SKIN:The skin is warm and dry, color consistent with ethnicity, patient has normal skin turgor and moist mucus membranes, skin intact, no breakdown or brusing noted.  MUSCULOSKELETAL: Patient moving all extremities well, no obvious swelling or deformities noted. Previous left sided upper and lower extremity deficits from CVA.  RESPIRATORY: Airway is open and patent, respirations are spontaneous, patient has a normal effort and rate, no accessory muscle use noted.  CARDIAC: Patient has a normal rate and rhythm, no periphreal edema noted in any extremity, capillary refill < 3 seconds in all extremities  ABDOMEN: Soft and non tender to palpation, no abdominal distention noted. Bowel sounds present in all four quadrants.  NEUROLOGIC: Eyes open spontaneously, behavior appropriate to situation, follows commands, facial expression symmetrical, bilateral hand grasp unequal and uneven, purposeful motor response noted, abnormal sensation in all extremities when touched with a finger. Decreased sensation on left side.

## 2019-12-04 NOTE — PROVIDER PROGRESS NOTES - EMERGENCY DEPT.
"Encounter Date: 12/4/2019    ED Physician Progress Notes        Physician Note:   Medical screening exam completed.  I have conducted a focused provider triage encounter, findings are as follows:    Brief history of present illness:  59-year-old female with past medical history of ESRD (oliguric) with RUE fistula (stented), last dialysis on Saturday and complaining of weakness and fatigue.  Patient is transitioning to new dialysis center and states she requires hepatitis panel as well for acceptance.  Denies chest pain or trouble breathing    -------------------------------                 12/04/19                          1034           -------------------------------   BP:          (!) 141/82         Pulse:           76             Resp:            18             Temp:     98.5 °F (36.9 °C)     TempSrc:        Oral            SpO2:            95%            Weight: 86 kg (189 lb 9.5 oz)   Height:     5' 3" (1.6 m)      -------------------------------    Pertinent physical exam:  A and O x4 but appears drowsy and fatigued, RRR no MRG, no lower extremity edema, CTAB, right upper extremity fistula without palpable thrill    Brief workup plan:  CBC, CMP, Mag, phos, EKG, Hep panel.    Preliminary workup initiated; this workup will be continued and followed by the physician or advanced practice provider that is assigned to the patient when roomed.       "

## 2019-12-04 NOTE — PLAN OF CARE
LANE was informed by BECKA Giles that pt is in the process of transferring dialysis service from [facility] to [facility] and that [facility] needs assistance obtaining medical records from [facility].  LANE spoke with Lupe Gibbs ([phone]) who explained that after already discharging pt they were informed by [facility] that the hep B panel sent to them in the records was too old.  LANE called [facility] Marleen ([phone]) and confirmed that the hep B panel was the only thing they needed in order to service pt.  LANE relayed this information to BECKA Giles who reported ordering the hep B panel.    Keira Ferro, NICOLE  [facility]  [id number]

## 2019-12-04 NOTE — ASSESSMENT & PLAN NOTE
ESRD on iHD     58 yo female presenting to the hospital for hemodialysis.  Was scheduled to start at a new dialysis unit on Monday, but her hepatitis labs were  and the new unit was unable to accept her.  Her last hemodialysis treatment was on  and she presented today for evaluation.  On admission she was found to have a clotted AVG.    Plan/Recommendations:  -recommend vascular surgery consult for declot  -treat hyperkalemia with kayexalate  -f/u with vascular surgery recommendations.  Currently no urgent need for RRT, but if unable to be declotted in a timely manner she will need a temporary dialysis catheter for therapy.

## 2019-12-04 NOTE — ED NOTES
Patient on stretcher, Bed placed in low/locked position, side rails up x 2, call light is within reach of patient or family, Patient placed into position of comfort. Pt hygiene maintained and diaper changed. Patient in NAD and offers no complaints at this time. Will continue to monitor.

## 2019-12-04 NOTE — SUBJECTIVE & OBJECTIVE
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    Cortical cataract of both eyes 7/26/2016    Decubitus ulcer of buttock, stage 2     Degeneration of lumbar or lumbosacral intervertebral disc 3/5/2013    S/p MRI L-spine 5/2009     Depression     Encounter for blood transfusion     ESRD on hemodialysis     Started August 2018    General anesthetics causing adverse effect in therapeutic use     Hemorrhagic cerebrovascular accident (CVA)     8/2016 [place] [facility] [place] with [place] hemiparesis    History of stroke 6/28/2017    s/p R-MCA stroke with R-putaminal hemorrhagic transformation in 8/2016 and 11/2016 (s/p [place]micraniotomy at [facility]) with residual L hemiparesis, on AED s/p CVA      Hypertensive retinopathy of both eyes 7/26/2016    Impingement syndrome of right shoulder 7/2/2014    Obesity     THERESA (obstructive sleep apnea) 3/5/2013    No Home CPAP 2ndary to cost     Partial symptomatic epilepsy with complex partial seizures, not intractable, without status epilepticus     Rheumatoid arthritis(714.0)     Rotator cuff tear 7/2/2014    Sarcoidosis     Stroke 2016    left sided flaccidity, SAH    Vertebral artery stenosis 3/5/2013    S/p Stenting per Dr Burnett      Past Surgical History:   Procedure Laterality Date    BREAST SURGERY      breast reduction    COLONOSCOPY N/A 8/11/2016    Procedure: COLONOSCOPY;  Surgeon: Jerry Vilchis MD;   Location: [facility] ENDO (4TH FLR);  Service: Endoscopy;  Laterality: N/A;  Patient reports difficulty awaking from anesthesia in the past.    DECLOTTING OF VASCULAR GRAFT Right 6/20/2019    Procedure: DECLOT-GRAFT;  Surgeon: Cabrera Irwin MD;  Location: [facility] CATH LAB;  Service: Cardiology;  Laterality: Right;    FISTULOGRAM Right 2/11/2019    Procedure: Fistulogram;  Surgeon: Meir Valencia MD;  Location: [facility] CATH LAB;  Service: Peripheral Vascular;  Laterality: Right;    FISTULOGRAM Right 7/8/2019    Procedure: Fistulogram;  Surgeon: WELLINGTON Palm III, MD;  Location: [facility] CATH LAB;  Service: Peripheral Vascular;  Laterality: Right;    HYSTERECTOMY  1999    JOSE LUIS/BSO (AUB)    PLACEMENT OF ARTERIOVENOUS GRAFT Right 10/18/2018    Procedure: AV GRAFT CREATION;  Surgeon: Meir Valencia MD;  Location: [facility] OR 2ND FLR;  Service: Cardiovascular;  Laterality: Right;    ROTATOR CUFF REPAIR Right July 9, 2014    right side    Skull surgery      Aneurysm    stent placed      in vertebral artery    TOTAL REDUCTION MAMMOPLASTY      TUBAL LIGATION       Family History     Problem Relation (Age of Onset)    Bell's palsy Sister    Blindness Maternal Grandmother    Breast cancer Mother    Diabetes Mother, Son,     Heart attack Mother    Heart disease Mother    Hypertension Mother, Sister    Lupus Sister    No Known Problems Daughter    Sleep apnea Sister    Stroke Sister, Maternal Aunt        Tobacco Use    Smoking status: Never Smoker    Smokeless tobacco: Never Used   Substance and Sexual Activity    Alcohol use: Yes     Comment: occasional wine cooler     Drug use: Never    Sexual activity: Yes     Partners: Male     Birth control/protection: Post-menopausal     Review of Systems   Constitutional: Negative for chills and fever.   Respiratory: Negative for chest tightness and shortness of breath.    Cardiovascular: Negative for chest pain.   Gastrointestinal: Negative for abdominal pain.   Musculoskeletal:  Negative for arthralgias.   Skin: Negative for color change.   Neurological: Negative for dizziness and headaches.   Psychiatric/Behavioral: Negative for agitation.     Objective:     Vital Signs (Most Recent):  Temp: 98.5 °F (36.9 °C) (12/04/19 1034)  Pulse: 78 (12/04/19 1459)  Resp: 16 (12/04/19 1459)  BP: (!) 143/67 (12/04/19 1458)  SpO2: 96 % (12/04/19 1459) Vital Signs (24h Range):  Temp:  [98.5 °F (36.9 °C)] 98.5 °F (36.9 °C)  Pulse:  [72-78] 78  Resp:  [16-18] 16  SpO2:  [93 %-97 %] 96 %  BP: (127-151)/(62-82) 143/67     Weight: 86 kg (189 lb 9.5 oz)  Body mass index is 33.59 kg/m².    Physical Exam   Constitutional: She is oriented to person, place, and time. She appears well-developed and well-nourished.   Cardiovascular: Normal rate.   Pulmonary/Chest: Effort normal.   Musculoskeletal:   RUE AVG without palpable thrill or pulsatility. 2+ R radial pulse   Neurological: She is alert and oriented to person, place, and time.   Nursing note and vitals reviewed.      Significant Labs:  CBC:   Recent Labs   Lab 12/04/19  1253   WBC 6.81   RBC 2.62*   HGB 8.5*   HCT 27.8*      *   MCH 32.4*   MCHC 30.6*     CMP:   Recent Labs   Lab 12/04/19  1253   *   CALCIUM 7.9*   ALBUMIN 3.4*   PROT 7.1      K 5.8*   CO2 25   CL 99   *   CREATININE 12.3*   ALKPHOS 97   ALT 7*   AST 7*   BILITOT 0.4       Significant Diagnostics:  I have reviewed all pertinent imaging results/findings within the past 24 hours.

## 2019-12-04 NOTE — ASSESSMENT & PLAN NOTE
ESRD TTHS (last HD 11/30)  Anemia in ESRD  Acute on chronic HFpEF   - HDS on admission, stable on RA  - Labs remarkable for mild hyperkalemia 5.8, phos 7.7  - Nephrology consulted for maintenance HD  - AVG to RUE without bruit or thrill; vascular surg consulted, appreciate assistance  - Will give kayexalate x1  - Renal/DM diet; NPO at MN as precaution  - Strict I/Os, daily weights  *[email or web address]

## 2019-12-04 NOTE — CONSULTS
[facility]  Nephrology  Consult Note    Patient Name: Lucy Perez  MRN: [id number]  Admission Date: 2019  Hospital Length of Stay: 0 days  Attending Provider: Angelo Veronica MD   Primary Care Physician: Beverly Muniz MD  Principal Problem:Hyperkalemia    Inpatient consult to Nephrology  Consult performed by: Kevin Allen NP  Consult ordered by: Anayeli Goins PA-C  Reason for consult: ESRD        Subjective:     HPI: Mrs. 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  Her last dialysis treatment was on Saturday () at her dialysis unit in [facility].  She was being set-up to be transferred to [facility] with her first treatment being on Monday (), but she reports that her hepatitis panel was  and they were unable to accept her.  She presented to the ED today because she was concerned that she had not had any dialysis this week, so she came to the hospital for further evaluation.  Labs notable for mild hyperkalemia (5.8) without EKG changes, BUN of 111, and Scr of 12.3.  Nephrology was consulted for ESRD management.    Upon exam, her AVG to her CAROLE was without bruit or thrill.      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    Cortical cataract of both eyes 2016    Decubitus ulcer of buttock, stage 2     Degeneration of lumbar or lumbosacral intervertebral disc 3/5/2013    S/p MRI L-spine 2009     Depression      Encounter for blood transfusion     ESRD on hemodialysis     Started August 2018    General anesthetics causing adverse effect in therapeutic use     Hemorrhagic cerebrovascular accident (CVA)     8/2016 [place]/p [place] [facility] [place] hemiparesis    History of stroke 6/28/2017    s/p R-MCA stroke with R-putaminal hemorrhagic transformation in 8/2016 and 11/2016 (s/p [place]micraniotomy [place] [facility]) with residual L hemiparesis, on AED s/p CVA      Hypertensive retinopathy of both eyes 7/26/2016    Impingement syndrome of right shoulder 7/2/2014    Obesity     THERESA (obstructive sleep apnea) 3/5/2013    No Home CPAP 2ndary to cost     Partial symptomatic epilepsy with complex partial seizures, not intractable, without status epilepticus     Rheumatoid arthritis(714.0)     Rotator cuff tear 7/2/2014    Sarcoidosis     Stroke 2016    left sided flaccidity, SAH    Vertebral artery stenosis 3/5/2013    S/p Stenting per Dr Burnett        Past Surgical History:   Procedure Laterality Date    BREAST SURGERY      breast reduction    COLONOSCOPY N/A 8/11/2016    Procedure: COLONOSCOPY;  Surgeon: Jerry Vilchis MD;  Location: [facility] ENDO ([address] [place] [address]);  Service: Endoscopy;  Laterality: N/A;  Patient reports difficulty awaking from anesthesia in the past.    DECLOTTING OF VASCULAR GRAFT Right 6/20/2019    Procedure: DECLOT-GRAFT;  Surgeon: Cabrera Irwin MD;  Location: [facility] CATH LAB;  Service: Cardiology;  Laterality: Right;    FISTULOGRAM Right 2/11/2019    Procedure: Fistulogram;  Surgeon: Meir Valencia MD;  Location: [facility] CATH LAB;  Service: Peripheral Vascular;  Laterality: Right;    FISTULOGRAM Right 7/8/2019    Procedure: Fistulogram;  Surgeon: WELLINGTON Palm III, MD;  Location: [facility] CATH LAB;  Service: Peripheral Vascular;  Laterality: Right;    HYSTERECTOMY  1999    JOSE LUIS/BSO (AUB)    PLACEMENT OF ARTERIOVENOUS GRAFT Right 10/18/2018    Procedure: AV GRAFT CREATION;  Surgeon: Meir Valencia MD;   "Location: [facility] OR [address] [place] [address];  Service: Cardiovascular;  Laterality: Right;    ROTATOR CUFF REPAIR Right July 9, 2014    right side    Skull surgery      Aneurysm    stent placed      in vertebral artery    TOTAL REDUCTION MAMMOPLASTY      TUBAL LIGATION         Review of patient's allergies indicates:   Allergen Reactions    Lisinopril Other (See Comments)     Angioedema      Vicodin [hydrocodone-acetaminophen] Rash     No problem with acetaminophen      Current Facility-Administered Medications   Medication Frequency    acetaminophen tablet 650 mg Q6H PRN    albuterol inhaler 2 puff Q6H PRN    albuterol-ipratropium 2.5 mg-0.5 mg/3 mL nebulizer solution 3 mL Q4H PRN    aspirin EC tablet 81 mg QAM    atorvastatin tablet 40 mg Daily    bisacodyl suppository 10 mg Daily PRN    calcium gluconate 1g in dextrose 5% 100mL (ready to mix system) ED 1 Time    dextrose 10% (D10W) Bolus PRN    dextrose 10% (D10W) Bolus PRN    folic acid tablet 1 mg Daily    glucagon (human recombinant) injection 1 mg PRN    glucose chewable tablet 16 g PRN    glucose chewable tablet 24 g PRN    heparin (porcine) injection 5,000 Units Q8H    insulin aspart U-100 pen 0-5 Units QID (AC + HS) PRN    insulin aspart U-100 pen 4 Units TIDWM    insulin detemir U-100 pen 13 Units Daily    [START ON 12/5/2019] levETIRAcetam tablet 1,000 mg Daily    levETIRAcetam tablet 500 mg Once    melatonin tablet 3 mg Nightly PRN    ondansetron disintegrating tablet 8 mg Q8H PRN    ondansetron injection 4 mg Q8H PRN    sevelamer carbonate tablet 800 mg TID WM    sodium chloride 0.9% flush 3 mL Q8H     Current Outpatient Medications   Medication    acetaminophen (TYLENOL) 325 MG tablet    albuterol (VENTOLIN HFA) 90 mcg/actuation inhaler    aspirin (ECOTRIN) 81 MG EC tablet    atorvastatin (LIPITOR) 40 MG tablet    BD INSULIN PEN NEEDLE UF SHORT 31 gauge x 5/16" Ndle    bisacodyl (DULCOLAX) 10 mg Supp    blood sugar diagnostic Strp "    blood-glucose meter kit    citalopram (CELEXA) 20 MG tablet    ergocalciferol (ERGOCALCIFEROL) 50,000 unit Cap    famotidine (PEPCID) 20 MG tablet    FLUoxetine 10 MG capsule    FLUoxetine 10 MG Tab    glipiZIDE (GLUCOTROL) 2.5 MG TR24    insulin aspart U-100 (NOVOLOG U-100 INSULIN ASPART) 100 unit/mL injection    insulin glargine (LANTUS U-100 INSULIN) 100 unit/mL injection    lancets Misc    levETIRAcetam (KEPPRA) 1000 MG tablet    levETIRAcetam (KEPPRA) 500 MG Tab    ondansetron (ZOFRAN-ODT) 4 MG TbDL    polyethylene glycol (MIRALAX) 17 gram/dose powder    pregabalin (LYRICA) 25 MG capsule    sevelamer carbonate (RENVELA) 800 mg Tab    folic acid (FOLVITE) 1 MG tablet     Family History     Problem Relation (Age of Onset)    Bell's palsy Sister    Blindness Maternal Grandmother    Breast cancer Mother    Diabetes Mother, Son,     Heart attack Mother    Heart disease Mother    Hypertension Mother, Sister    Lupus Sister    No Known Problems Daughter    Sleep apnea Sister    Stroke Sister, Maternal Aunt        Tobacco Use    Smoking status: Never Smoker    Smokeless tobacco: Never Used   Substance and Sexual Activity    Alcohol use: Yes     Comment: occasional wine cooler     Drug use: Never    Sexual activity: Yes     Partners: Male     Birth control/protection: Post-menopausal     Review of Systems   Constitutional: Positive for fatigue. Negative for activity change, chills and fever.   HENT: Negative for congestion, facial swelling, postnasal drip, rhinorrhea, sinus pressure and sinus pain.    Respiratory: Negative for cough, chest tightness and shortness of breath.    Cardiovascular: Negative for chest pain, palpitations and leg swelling.   Gastrointestinal: Positive for constipation. Negative for abdominal distention, diarrhea, nausea and vomiting.   Musculoskeletal: Positive for gait problem. Negative for arthralgias and myalgias.   Skin: Negative for color change, rash and wound.    Neurological: Negative for dizziness and light-headedness.   Psychiatric/Behavioral: Negative for agitation, behavioral problems and confusion.     Objective:     Vital Signs (Most Recent):  Temp: 98.5 °F (36.9 °C) (12/04/19 1034)  Pulse: 74 (12/04/19 1357)  Resp: 18 (12/04/19 1357)  BP: 127/62 (12/04/19 1358)  SpO2: (!) 93 % (12/04/19 1357)  O2 Device (Oxygen Therapy): room air (12/04/19 1034) Vital Signs (24h Range):  Temp:  [98.5 °F (36.9 °C)] 98.5 °F (36.9 °C)  Pulse:  [72-76] 74  Resp:  [17-18] 18  SpO2:  [93 %-97 %] 93 %  BP: (127-151)/(62-82) 127/62     Weight: 86 kg (189 lb 9.5 oz) (12/04/19 1034)  Body mass index is 33.59 kg/m².  Body surface area is 1.96 meters squared.    No intake/output data recorded.    Physical Exam   Constitutional: She is oriented to person, place, and time. She appears well-developed. No distress.   HENT:   Head: Normocephalic and atraumatic.   Right Ear: External ear normal.   Left Ear: External ear normal.   Eyes: Conjunctivae and EOM are normal. Right eye exhibits no discharge. Left eye exhibits no discharge. No scleral icterus.   Neck: Normal range of motion. Neck supple.   Cardiovascular: Normal rate and regular rhythm. Exam reveals no gallop and no friction rub.   No murmur heard.  Pulmonary/Chest: Effort normal and breath sounds normal. No respiratory distress. She has no wheezes. She has no rales.   Abdominal: Soft. Bowel sounds are normal. She exhibits no distension. There is no tenderness.   Musculoskeletal: She exhibits no edema.   Neurological: She is alert and oriented to person, place, and time.   Skin: Skin is warm and dry. She is not diaphoretic.   CAROLE AVG  Negative bruit/thrill       Significant Labs:  CBC:   Recent Labs   Lab 12/04/19  1253   WBC 6.81   RBC 2.62*   HGB 8.5*   HCT 27.8*      *   MCH 32.4*   MCHC 30.6*     CMP:   Recent Labs   Lab 12/04/19  1253   *   CALCIUM 7.9*   ALBUMIN 3.4*   PROT 7.1      K 5.8*   CO2 25   CL 99    *   CREATININE 12.3*   ALKPHOS 97   ALT 7*   AST 7*   BILITOT 0.4           Assessment/Plan:     ESRD (end stage renal disease) on dialysis  ESRD on iHD     58 yo female presenting to the hospital for hemodialysis.  Was scheduled to start at a new dialysis unit on Monday, but her hepatitis labs were  and the new unit was unable to accept her.  Her last hemodialysis treatment was on  and she presented today for evaluation.  On admission she was found to have a clotted AVG.    Plan/Recommendations:  -recommend vascular surgery consult for declot  -treat hyperkalemia with kayexalate  -f/u with vascular surgery recommendations.  Currently no urgent need for RRT, but if unable to be declotted in a timely manner she will need a temporary dialysis catheter for therapy.        Kevin Mcbride, ROHAN  Nephrology  [facility]

## 2019-12-04 NOTE — HPI
Mrs. 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  Her last dialysis treatment was on Saturday () at her dialysis unit in [facility].  She was being set-up to be transferred to [facility] with her first treatment being on Monday (), but she reports that her hepatitis panel was  and they were unable to accept her.  She presented to the ED today because she was concerned that she had not had any dialysis this week, so she came to the hospital for further evaluation.  Labs notable for mild hyperkalemia (5.8) without EKG changes, BUN of 111, and Scr of 12.3.  Nephrology was consulted for ESRD management.    Upon exam, her AVG to her CAROLE was without bruit or thrill.

## 2019-12-04 NOTE — SUBJECTIVE & OBJECTIVE
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    Cortical cataract of both eyes 7/26/2016    Decubitus ulcer of buttock, stage 2     Degeneration of lumbar or lumbosacral intervertebral disc 3/5/2013    S/p MRI L-spine 5/2009     Depression     Encounter for blood transfusion     ESRD on hemodialysis     Started August 2018    General anesthetics causing adverse effect in therapeutic use     Hemorrhagic cerebrovascular accident (CVA)     8/2016 [place] [facility] [place] hemiparesis    History of stroke 6/28/2017    s/p R-MCA stroke with R-putaminal hemorrhagic transformation in 8/2016 and 11/2016 (s/p [place]raniot[place] [facility] with residual L hemiparesis, on AED s/p CVA      Hypertensive retinopathy of both eyes 7/26/2016    Impingement syndrome of right shoulder 7/2/2014    Obesity     THERESA (obstructive sleep apnea) 3/5/2013    No Home CPAP 2ndary to cost     Partial symptomatic epilepsy with complex partial seizures, not intractable, without status epilepticus     Rheumatoid arthritis(714.0)     Rotator cuff tear 7/2/2014    Sarcoidosis     Stroke 2016    left sided flaccidity, SAH    Vertebral artery stenosis 3/5/2013    S/p Stenting per Dr Burnett        Past Surgical History:   Procedure Laterality Date    BREAST SURGERY      breast reduction    COLONOSCOPY N/A 8/11/2016    Procedure: COLONOSCOPY;  Surgeon: Jerry Vilchis MD;  Location: [address]);  Service: Endoscopy;  Laterality: N/A;  Patient reports difficulty awaking from anesthesia in the past.    DECLOTTING OF VASCULAR GRAFT Right 6/20/2019    Procedure: DECLOT-GRAFT;  Surgeon: Cabrera IBARRA  MD Alida;  Location: [facility] CATH LAB;  Service: Cardiology;  Laterality: Right;    FISTULOGRAM Right 2/11/2019    Procedure: Fistulogram;  Surgeon: Meir Valencia MD;  Location: [facility] CATH LAB;  Service: Peripheral Vascular;  Laterality: Right;    FISTULOGRAM Right 7/8/2019    Procedure: Fistulogram;  Surgeon: WELLINGTON Palm III, MD;  Location: [facility] CATH LAB;  Service: Peripheral Vascular;  Laterality: Right;    HYSTERECTOMY  1999    JOSE LUIS/BSO (AUB)    PLACEMENT OF ARTERIOVENOUS GRAFT Right 10/18/2018    Procedure: AV GRAFT CREATION;  Surgeon: Meir Valencia MD;  Location: [facility] OR [facility] FLR;  Service: Cardiovascular;  Laterality: Right;    ROTATOR CUFF REPAIR Right July 9, 2014    right side    Skull surgery      Aneurysm    stent placed      in vertebral artery    TOTAL REDUCTION MAMMOPLASTY      TUBAL LIGATION         Review of patient's allergies indicates:   Allergen Reactions    Lisinopril Other (See Comments)     Angioedema      Vicodin [hydrocodone-acetaminophen] Rash     No problem with acetaminophen      Current Facility-Administered Medications   Medication Frequency    acetaminophen tablet 650 mg Q6H PRN    albuterol inhaler 2 puff Q6H PRN    albuterol-ipratropium 2.5 mg-0.5 mg/3 mL nebulizer solution 3 mL Q4H PRN    aspirin EC tablet 81 mg QAM    atorvastatin tablet 40 mg Daily    bisacodyl suppository 10 mg Daily PRN    calcium gluconate 1g in dextrose 5% 100mL (ready to mix system) ED 1 Time    dextrose 10% (D10W) Bolus PRN    dextrose 10% (D10W) Bolus PRN    folic acid tablet 1 mg Daily    glucagon (human recombinant) injection 1 mg PRN    glucose chewable tablet 16 g PRN    glucose chewable tablet 24 g PRN    heparin (porcine) injection 5,000 Units Q8H    insulin aspart U-100 pen 0-5 Units QID (AC + HS) PRN    insulin aspart U-100 pen 4 Units TIDWM    insulin detemir U-100 pen 13 Units Daily    [START ON 12/5/2019] levETIRAcetam tablet 1,000 mg Daily    levETIRAcetam  "tablet 500 mg Once    melatonin tablet 3 mg Nightly PRN    ondansetron disintegrating tablet 8 mg Q8H PRN    ondansetron injection 4 mg Q8H PRN    sevelamer carbonate tablet 800 mg TID WM    sodium chloride 0.9% flush 3 mL Q8H     Current Outpatient Medications   Medication    acetaminophen (TYLENOL) 325 MG tablet    albuterol (VENTOLIN HFA) 90 mcg/actuation inhaler    aspirin (ECOTRIN) 81 MG EC tablet    atorvastatin (LIPITOR) 40 MG tablet    BD INSULIN PEN NEEDLE UF SHORT 31 gauge x 5/16" Ndle    bisacodyl (DULCOLAX) 10 mg Supp    blood sugar diagnostic Strp    blood-glucose meter kit    citalopram (CELEXA) 20 MG tablet    ergocalciferol (ERGOCALCIFEROL) 50,000 unit Cap    famotidine (PEPCID) 20 MG tablet    FLUoxetine 10 MG capsule    FLUoxetine 10 MG Tab    glipiZIDE (GLUCOTROL) 2.5 MG TR24    insulin aspart U-100 (NOVOLOG U-100 INSULIN ASPART) 100 unit/mL injection    insulin glargine (LANTUS U-100 INSULIN) 100 unit/mL injection    lancets Misc    levETIRAcetam (KEPPRA) 1000 MG tablet    levETIRAcetam (KEPPRA) 500 MG Tab    ondansetron (ZOFRAN-ODT) 4 MG TbDL    polyethylene glycol (MIRALAX) 17 gram/dose powder    pregabalin (LYRICA) 25 MG capsule    sevelamer carbonate (RENVELA) 800 mg Tab    folic acid (FOLVITE) 1 MG tablet     Family History     Problem Relation (Age of Onset)    Bell's palsy Sister    Blindness Maternal Grandmother    Breast cancer Mother    Diabetes Mother, Son,     Heart attack Mother    Heart disease Mother    Hypertension Mother, Sister    Lupus Sister    No Known Problems Daughter    Sleep apnea Sister    Stroke Sister, Maternal Aunt        Tobacco Use    Smoking status: Never Smoker    Smokeless tobacco: Never Used   Substance and Sexual Activity    Alcohol use: Yes     Comment: occasional wine cooler     Drug use: Never    Sexual activity: Yes     Partners: Male     Birth control/protection: Post-menopausal     Review of Systems   Constitutional: " Positive for fatigue. Negative for activity change, chills and fever.   HENT: Negative for congestion, facial swelling, postnasal drip, rhinorrhea, sinus pressure and sinus pain.    Respiratory: Negative for cough, chest tightness and shortness of breath.    Cardiovascular: Negative for chest pain, palpitations and leg swelling.   Gastrointestinal: Positive for constipation. Negative for abdominal distention, diarrhea, nausea and vomiting.   Musculoskeletal: Positive for gait problem. Negative for arthralgias and myalgias.   Skin: Negative for color change, rash and wound.   Neurological: Negative for dizziness and light-headedness.   Psychiatric/Behavioral: Negative for agitation, behavioral problems and confusion.     Objective:     Vital Signs (Most Recent):  Temp: 98.5 °F (36.9 °C) (12/04/19 1034)  Pulse: 74 (12/04/19 1357)  Resp: 18 (12/04/19 1357)  BP: 127/62 (12/04/19 1358)  SpO2: (!) 93 % (12/04/19 1357)  O2 Device (Oxygen Therapy): room air (12/04/19 1034) Vital Signs (24h Range):  Temp:  [98.5 °F (36.9 °C)] 98.5 °F (36.9 °C)  Pulse:  [72-76] 74  Resp:  [17-18] 18  SpO2:  [93 %-97 %] 93 %  BP: (127-151)/(62-82) 127/62     Weight: 86 kg (189 lb 9.5 oz) (12/04/19 1034)  Body mass index is 33.59 kg/m².  Body surface area is 1.96 meters squared.    No intake/output data recorded.    Physical Exam   Constitutional: She is oriented to person, place, and time. She appears well-developed. No distress.   HENT:   Head: Normocephalic and atraumatic.   Right Ear: External ear normal.   Left Ear: External ear normal.   Eyes: Conjunctivae and EOM are normal. Right eye exhibits no discharge. Left eye exhibits no discharge. No scleral icterus.   Neck: Normal range of motion. Neck supple.   Cardiovascular: Normal rate and regular rhythm. Exam reveals no gallop and no friction rub.   No murmur heard.  Pulmonary/Chest: Effort normal and breath sounds normal. No respiratory distress. She has no wheezes. She has no rales.    Abdominal: Soft. Bowel sounds are normal. She exhibits no distension. There is no tenderness.   Musculoskeletal: She exhibits no edema.   Neurological: She is alert and oriented to person, place, and time.   Skin: Skin is warm and dry. She is not diaphoretic.   CAROLE AVG  Negative bruit/thrill       Significant Labs:  CBC:   Recent Labs   Lab 12/04/19  1253   WBC 6.81   RBC 2.62*   HGB 8.5*   HCT 27.8*      *   MCH 32.4*   MCHC 30.6*     CMP:   Recent Labs   Lab 12/04/19  1253   *   CALCIUM 7.9*   ALBUMIN 3.4*   PROT 7.1      K 5.8*   CO2 25   CL 99   *   CREATININE 12.3*   ALKPHOS 97   ALT 7*   AST 7*   BILITOT 0.4

## 2019-12-05 PROBLEM — Z78.9 PROBLEM WITH VASCULAR ACCESS: Status: ACTIVE | Noted: 2019-12-05

## 2019-12-05 LAB
ALBUMIN SERPL BCP-MCNC: 3.4 G/DL (ref 3.5–5.2)
ANION GAP SERPL CALC-SCNC: 22 MMOL/L (ref 8–16)
BASOPHILS # BLD AUTO: 0.03 K/UL (ref 0–0.2)
BASOPHILS NFR BLD: 0.5 % (ref 0–1.9)
BUN SERPL-MCNC: 109 MG/DL (ref 8–23)
CALCIUM SERPL-MCNC: 7.9 MG/DL (ref 8.7–10.5)
CHLORIDE SERPL-SCNC: 99 MMOL/L (ref 95–110)
CO2 SERPL-SCNC: 26 MMOL/L (ref 23–29)
CREAT SERPL-MCNC: 13.2 MG/DL (ref 0.5–1.4)
DIFFERENTIAL METHOD: ABNORMAL
EOSINOPHIL # BLD AUTO: 0.2 K/UL (ref 0–0.5)
EOSINOPHIL NFR BLD: 2.6 % (ref 0–8)
ERYTHROCYTE [DISTWIDTH] IN BLOOD BY AUTOMATED COUNT: 12.9 % (ref 11.5–14.5)
EST. GFR  (AFRICAN AMERICAN): 3.1 ML/MIN/1.73 M^2
EST. GFR  (NON AFRICAN AMERICAN): 2.7 ML/MIN/1.73 M^2
GLUCOSE SERPL-MCNC: 84 MG/DL (ref 70–110)
HCT VFR BLD AUTO: 29.4 % (ref 37–48.5)
HGB BLD-MCNC: 9.1 G/DL (ref 12–16)
IMM GRANULOCYTES # BLD AUTO: 0.02 K/UL (ref 0–0.04)
IMM GRANULOCYTES NFR BLD AUTO: 0.3 % (ref 0–0.5)
LYMPHOCYTES # BLD AUTO: 0.6 K/UL (ref 1–4.8)
LYMPHOCYTES NFR BLD: 9.9 % (ref 18–48)
MCH RBC QN AUTO: 32.4 PG (ref 27–31)
MCHC RBC AUTO-ENTMCNC: 31 G/DL (ref 32–36)
MCV RBC AUTO: 105 FL (ref 82–98)
MONOCYTES # BLD AUTO: 0.5 K/UL (ref 0.3–1)
MONOCYTES NFR BLD: 8.2 % (ref 4–15)
NEUTROPHILS # BLD AUTO: 4.8 K/UL (ref 1.8–7.7)
NEUTROPHILS NFR BLD: 78.5 % (ref 38–73)
NRBC BLD-RTO: 0 /100 WBC
PHOSPHATE SERPL-MCNC: 8.3 MG/DL (ref 2.7–4.5)
PLATELET # BLD AUTO: 224 K/UL (ref 150–350)
PMV BLD AUTO: 10.5 FL (ref 9.2–12.9)
POCT GLUCOSE: 131 MG/DL (ref 70–110)
POCT GLUCOSE: 189 MG/DL (ref 70–110)
POCT GLUCOSE: 207 MG/DL (ref 70–110)
POCT GLUCOSE: 218 MG/DL (ref 70–110)
POCT GLUCOSE: 86 MG/DL (ref 70–110)
POTASSIUM SERPL-SCNC: 5.1 MMOL/L (ref 3.5–5.1)
POTASSIUM SERPL-SCNC: 5.3 MMOL/L (ref 3.5–5.1)
RBC # BLD AUTO: 2.81 M/UL (ref 4–5.4)
SODIUM SERPL-SCNC: 147 MMOL/L (ref 136–145)
WBC # BLD AUTO: 6.07 K/UL (ref 3.9–12.7)

## 2019-12-05 PROCEDURE — 99214 PR OFFICE/OUTPT VISIT, EST, LEVL IV, 30-39 MIN: ICD-10-PCS | Mod: HCNC,NTX,, | Performed by: NURSE PRACTITIONER

## 2019-12-05 PROCEDURE — 63600175 PHARM REV CODE 636 W HCPCS: Mod: HCNC,NTX | Performed by: PHYSICIAN ASSISTANT

## 2019-12-05 PROCEDURE — 99233 SBSQ HOSP IP/OBS HIGH 50: CPT | Mod: HCNC,NTX,, | Performed by: PHYSICIAN ASSISTANT

## 2019-12-05 PROCEDURE — 25000003 PHARM REV CODE 250: Mod: HCNC,NTX | Performed by: PHYSICIAN ASSISTANT

## 2019-12-05 PROCEDURE — 85025 COMPLETE CBC W/AUTO DIFF WBC: CPT | Mod: HCNC,NTX

## 2019-12-05 PROCEDURE — 36415 COLL VENOUS BLD VENIPUNCTURE: CPT | Mod: HCNC,NTX

## 2019-12-05 PROCEDURE — 84132 ASSAY OF SERUM POTASSIUM: CPT | Mod: HCNC,NTX

## 2019-12-05 PROCEDURE — 80069 RENAL FUNCTION PANEL: CPT | Mod: HCNC,NTX

## 2019-12-05 PROCEDURE — 11000001 HC ACUTE MED/SURG PRIVATE ROOM: Mod: HCNC,NTX

## 2019-12-05 PROCEDURE — A4216 STERILE WATER/SALINE, 10 ML: HCPCS | Mod: HCNC,NTX | Performed by: PHYSICIAN ASSISTANT

## 2019-12-05 PROCEDURE — 96372 THER/PROPH/DIAG INJ SC/IM: CPT

## 2019-12-05 PROCEDURE — 99233 PR SUBSEQUENT HOSPITAL CARE,LEVL III: ICD-10-PCS | Mod: HCNC,NTX,, | Performed by: PHYSICIAN ASSISTANT

## 2019-12-05 PROCEDURE — 99214 OFFICE O/P EST MOD 30 MIN: CPT | Mod: HCNC,NTX,, | Performed by: NURSE PRACTITIONER

## 2019-12-05 RX ORDER — IBUPROFEN 200 MG
24 TABLET ORAL
Status: DISCONTINUED | OUTPATIENT
Start: 2019-12-05 | End: 2019-12-09 | Stop reason: HOSPADM

## 2019-12-05 RX ORDER — SEVELAMER CARBONATE 800 MG/1
1600 TABLET, FILM COATED ORAL
Status: DISCONTINUED | OUTPATIENT
Start: 2019-12-05 | End: 2019-12-09 | Stop reason: HOSPADM

## 2019-12-05 RX ORDER — INSULIN ASPART 100 [IU]/ML
2 INJECTION, SOLUTION INTRAVENOUS; SUBCUTANEOUS
Status: DISCONTINUED | OUTPATIENT
Start: 2019-12-05 | End: 2019-12-07

## 2019-12-05 RX ORDER — GLUCAGON 1 MG
1 KIT INJECTION
Status: DISCONTINUED | OUTPATIENT
Start: 2019-12-05 | End: 2019-12-09 | Stop reason: HOSPADM

## 2019-12-05 RX ORDER — IBUPROFEN 200 MG
16 TABLET ORAL
Status: DISCONTINUED | OUTPATIENT
Start: 2019-12-05 | End: 2019-12-09 | Stop reason: HOSPADM

## 2019-12-05 RX ADMIN — HEPARIN SODIUM 5000 UNITS: 5000 INJECTION, SOLUTION INTRAVENOUS; SUBCUTANEOUS at 06:12

## 2019-12-05 RX ADMIN — HEPARIN SODIUM 5000 UNITS: 5000 INJECTION, SOLUTION INTRAVENOUS; SUBCUTANEOUS at 12:12

## 2019-12-05 RX ADMIN — Medication 3 ML: at 12:12

## 2019-12-05 RX ADMIN — SEVELAMER CARBONATE 1600 MG: 800 TABLET, FILM COATED ORAL at 05:12

## 2019-12-05 RX ADMIN — ATORVASTATIN CALCIUM 40 MG: 20 TABLET, FILM COATED ORAL at 08:12

## 2019-12-05 RX ADMIN — INSULIN ASPART 2 UNITS: 100 INJECTION, SOLUTION INTRAVENOUS; SUBCUTANEOUS at 01:12

## 2019-12-05 RX ADMIN — SEVELAMER CARBONATE 800 MG: 800 TABLET, FILM COATED ORAL at 08:12

## 2019-12-05 RX ADMIN — Medication 3 ML: at 03:12

## 2019-12-05 RX ADMIN — LEVETIRACETAM 1000 MG: 500 TABLET ORAL at 08:12

## 2019-12-05 RX ADMIN — SEVELAMER CARBONATE 1600 MG: 800 TABLET, FILM COATED ORAL at 12:12

## 2019-12-05 RX ADMIN — Medication 3 ML: at 09:12

## 2019-12-05 RX ADMIN — ACETAMINOPHEN 650 MG: 325 TABLET ORAL at 08:12

## 2019-12-05 RX ADMIN — INSULIN ASPART 2 UNITS: 100 INJECTION, SOLUTION INTRAVENOUS; SUBCUTANEOUS at 05:12

## 2019-12-05 RX ADMIN — HEPARIN SODIUM 5000 UNITS: 5000 INJECTION, SOLUTION INTRAVENOUS; SUBCUTANEOUS at 09:12

## 2019-12-05 RX ADMIN — ASPIRIN 81 MG: 81 TABLET, COATED ORAL at 10:12

## 2019-12-05 RX ADMIN — FOLIC ACID 1 MG: 1 TABLET ORAL at 08:12

## 2019-12-05 RX ADMIN — Medication 3 ML: at 06:12

## 2019-12-05 RX ADMIN — HEPARIN SODIUM 5000 UNITS: 5000 INJECTION, SOLUTION INTRAVENOUS; SUBCUTANEOUS at 03:12

## 2019-12-05 NOTE — SUBJECTIVE & OBJECTIVE
Interval History:   Seen by vascular surgery yesterday with plans for declot/permacath tomorrow.  She complains of back pain this morning, otherwise with good appetite and no signs of uremia on examination.  Mild hyperkalemia on AM labs (5.3), without other major electrolyte abnormalities.  Oxygenating well on RA with no complaints of SOB.    Review of patient's allergies indicates:   Allergen Reactions    Lisinopril Other (See Comments)     Angioedema      Vicodin [hydrocodone-acetaminophen] Rash     No problem with acetaminophen      Current Facility-Administered Medications   Medication Frequency    acetaminophen tablet 650 mg Q6H PRN    albuterol inhaler 2 puff Q6H PRN    albuterol-ipratropium 2.5 mg-0.5 mg/3 mL nebulizer solution 3 mL Q4H PRN    aspirin EC tablet 81 mg QAM    atorvastatin tablet 40 mg Daily    bisacodyl suppository 10 mg Daily PRN    dextrose 10% (D10W) Bolus PRN    dextrose 10% (D10W) Bolus PRN    folic acid tablet 1 mg Daily    heparin (porcine) injection 5,000 Units Q8H    levETIRAcetam tablet 1,000 mg Daily    melatonin tablet 3 mg Nightly PRN    ondansetron disintegrating tablet 8 mg Q8H PRN    ondansetron injection 4 mg Q8H PRN    sevelamer carbonate tablet 1,600 mg TID WM    sodium chloride 0.9% flush 3 mL Q8H       Objective:     Vital Signs (Most Recent):  Temp: 98.3 °F (36.8 °C) (12/05/19 0819)  Pulse: 69 (12/05/19 1117)  Resp: (!) 22 (12/05/19 0819)  BP: (!) 112/57 (12/05/19 0819)  SpO2: 99 % (12/05/19 0819)  O2 Device (Oxygen Therapy): room air (12/04/19 2300) Vital Signs (24h Range):  Temp:  [98 °F (36.7 °C)-98.6 °F (37 °C)] 98.3 °F (36.8 °C)  Pulse:  [69-83] 69  Resp:  [16-22] 22  SpO2:  [93 %-99 %] 99 %  BP: (112-151)/(57-76) 112/57     Weight: 89.7 kg (197 lb 11.2 oz) (12/05/19 0400)  Body mass index is 35.02 kg/m².  Body surface area is 2 meters squared.    I/O last 3 completed shifts:  In: 60 [P.O.:60]  Out: 0     Physical Exam   Constitutional: She is  oriented to person, place, and time. She appears well-developed. No distress.   HENT:   Head: Normocephalic and atraumatic.   Right Ear: External ear normal.   Left Ear: External ear normal.   Eyes: Conjunctivae and EOM are normal. Right eye exhibits no discharge. Left eye exhibits no discharge. No scleral icterus.   Neck: Normal range of motion. Neck supple.   Cardiovascular: Normal rate and regular rhythm. Exam reveals no gallop and no friction rub.   No murmur heard.  Pulmonary/Chest: Effort normal and breath sounds normal. No respiratory distress. She has no wheezes. She has no rales.   Abdominal: Soft. Bowel sounds are normal. She exhibits no distension. There is no tenderness.   Musculoskeletal: She exhibits no edema.   Neurological: She is alert and oriented to person, place, and time.   Skin: Skin is warm and dry. She is not diaphoretic.   CAROLE AVG  Negative bruit/thrill       Significant Labs:  CBC:   Recent Labs   Lab 12/05/19  0814   WBC 6.07   RBC 2.81*   HGB 9.1*   HCT 29.4*      *   MCH 32.4*   MCHC 31.0*     CMP:   Recent Labs   Lab 12/04/19  1253 12/05/19  0814   * 84   CALCIUM 7.9* 7.9*   ALBUMIN 3.4* 3.4*   PROT 7.1  --     147*   K 5.8* 5.3*   CO2 25 26   CL 99 99   * 109*   CREATININE 12.3* 13.2*   ALKPHOS 97  --    ALT 7*  --    AST 7*  --    BILITOT 0.4  --

## 2019-12-05 NOTE — ASSESSMENT & PLAN NOTE
- Last A1c 6.8 in 8/2019; repeat 7.8 today  -  on admit; patient endorses not taking home insulin  - 13U detemir qdaily, aspart 4U TID WM, low dose SSI  - Renal/DM diet  - Monitor BGs and adjust insulin PRN  12/5: BG improved with insulin, 84-130s  Discontinue prandial insulin  monitor for hypoglycemia

## 2019-12-05 NOTE — PROGRESS NOTES
Rechecked pt /74 pulse 74. Insulin orders were discontinued. MD Tisha Aguilera called about pt lunch time CBG at 218. Md to put in orders for insulin.

## 2019-12-05 NOTE — SUBJECTIVE & OBJECTIVE
Interval History: No reported events overnight. Pt seen at bedside this AM resting in NAD with  at bedside. She has no new complaints. Discussed plan of care- vasc surg following with plans to declot hopefully tomorrow.    Review of Systems   Constitutional: Negative for fatigue and fever.   Respiratory: Negative for cough and shortness of breath.    Cardiovascular: Negative for chest pain, palpitations and leg swelling.   Gastrointestinal: Negative for abdominal pain, diarrhea, nausea and vomiting.   Genitourinary: Positive for decreased urine volume (ESRD). Negative for dysuria and hematuria.   Musculoskeletal: Positive for back pain (chronic) and gait problem (wheelchair bound since CVA).   Skin: Negative for rash and wound.   Neurological: Positive for weakness (chronic L sided weakness s/p CVA). Negative for syncope and headaches.   Psychiatric/Behavioral: Negative for agitation. The patient is not nervous/anxious.      Objective:     Vital Signs (Most Recent):  Temp: 98.3 °F (36.8 °C) (12/05/19 0819)  Pulse: 73 (12/05/19 0819)  Resp: (!) 22 (12/05/19 0819)  BP: (!) 112/57 (12/05/19 0819)  SpO2: 99 % (12/05/19 0819) Vital Signs (24h Range):  Temp:  [98 °F (36.7 °C)-98.6 °F (37 °C)] 98.3 °F (36.8 °C)  Pulse:  [72-83] 73  Resp:  [16-22] 22  SpO2:  [93 %-99 %] 99 %  BP: (112-151)/(57-76) 112/57     Weight: 89.7 kg (197 lb 11.2 oz)  Body mass index is 35.02 kg/m².    Intake/Output Summary (Last 24 hours) at 12/5/2019 1048  Last data filed at 12/5/2019 0400  Gross per 24 hour   Intake 60 ml   Output 0 ml   Net 60 ml      Physical Exam   Constitutional: She is oriented to person, place, and time. She appears well-developed and well-nourished. No distress.   HENT:   Head: Normocephalic and atraumatic.   Eyes: EOM are normal. Right eye exhibits no discharge. Left eye exhibits no discharge.   Neck: Normal range of motion. Neck supple.   Cardiovascular: Normal rate, regular rhythm, normal heart sounds and intact  distal pulses.   Pulmonary/Chest: Effort normal. No respiratory distress.   Abdominal: Soft. Bowel sounds are normal. She exhibits no distension. There is no tenderness.   Musculoskeletal: Normal range of motion. She exhibits no edema or tenderness.   Neurological: She is alert and oriented to person, place, and time.   Chronic L sided hemiparesis   Skin: Skin is warm and dry. She is not diaphoretic.   RUE AVG, no palpable thrill   Psychiatric: She has a normal mood and affect. Her behavior is normal.       Significant Labs: All pertinent labs within the past 24 hours have been reviewed.    Significant Imaging: I have reviewed all pertinent imaging results/findings within the past 24 hours.

## 2019-12-05 NOTE — PLAN OF CARE
12/05/19 1216   Post-Acute Status   Post-Acute Authorization Dialysis   Diaylsis Status Set-up Complete     SW was informed by [facility] Marleen that pt's chair time is set for Tues/Thurs/Sat at noon.  Schedule to be faxed from their main intake.    UPDATE 2:50 PM  SW obtained schedule from [facility] and gave it to pt and .    Keira Ferro LMSW  [facility]  [id number]

## 2019-12-05 NOTE — CONSULTS
[facility]  Vascular Surgery  Consult Note    Inpatient consult to Vascular Surgery  Consult performed by: Mariah Brown MD  Consult ordered by: Anayeli Goins PA-C  Reason for consult: RUE AVG thrombosis        Subjective:      Chief Complaint/Reason for Admission: dialysis    History of Present Illness: Ms Perez is a 58yo female with HTN, ESRD (iHD through RUE fistula, last on Saturday), h/o stroke, h/o seizures with residual left sided weakness here today with need for dialysis given clotted RUE AVG. Vascular surgery was consulted for assistance with access.    Patient states she last dialyzed 11/30, no issues with dialysis then. She has had some bleeding after dialysis. She had some mixup with hepatitis B as she was trying to transfer to a different dialysis center on Monday so she was unable to dialyze. She denies chest pain, shortness of breath. She came to the ED because she was hoping they could help with getting dialysis.      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    Cortical cataract of both eyes 7/26/2016    Decubitus ulcer of buttock, stage 2     Degeneration of lumbar or lumbosacral intervertebral disc 3/5/2013    S/p MRI L-spine 5/2009     Depression     Encounter for blood transfusion     ESRD on hemodialysis     Started August 2018    General anesthetics causing adverse effect in therapeutic use      Hemorrhagic cerebrovascular accident (CVA)     8/2016 s/p Hemicraniotomy at [facility] [place] with Left hemiparesis    History of stroke 6/28/2017    s/p R-MCA stroke with R-putaminal hemorrhagic transformation in 8/2016 and 11/2016 (s/p hemicraniotomy at [facility] [place]) with residual L hemiparesis, on AED s/p CVA      Hypertensive retinopathy of both eyes 7/26/2016    Impingement syndrome of right shoulder 7/2/2014    Obesity     THERESA (obstructive sleep apnea) 3/5/2013    No Home CPAP 2ndary to cost     Partial symptomatic epilepsy with complex partial seizures, not intractable, without status epilepticus     Rheumatoid arthritis(714.0)     Rotator cuff tear 7/2/2014    Sarcoidosis     Stroke 2016    left sided flaccidity, SAH    Vertebral artery stenosis 3/5/2013    S/p Stenting per Dr Burnett      Past Surgical History:   Procedure Laterality Date    BREAST SURGERY      breast reduction    COLONOSCOPY N/A 8/11/2016    Procedure: COLONOSCOPY;  Surgeon: Jerry Vilchis MD;  Location: [facility] ENDO (4TH FLR);  Service: Endoscopy;  Laterality: N/A;  Patient reports difficulty awaking from anesthesia in the past.    DECLOTTING OF VASCULAR GRAFT Right 6/20/2019    Procedure: DECLOT-GRAFT;  Surgeon: Cabrera Irwin MD;  Location: [facility] CATH LAB;  Service: Cardiology;  Laterality: Right;    FISTULOGRAM Right 2/11/2019    Procedure: Fistulogram;  Surgeon: Meir Valencia MD;  Location: [facility] CATH LAB;  Service: Peripheral Vascular;  Laterality: Right;    FISTULOGRAM Right 7/8/2019    Procedure: Fistulogram;  Surgeon: WELLINGTON Palm III, MD;  Location: [facility] CATH LAB;  Service: Peripheral Vascular;  Laterality: Right;    HYSTERECTOMY  1999    JOSE LUIS/BSO (AUB)    PLACEMENT OF ARTERIOVENOUS GRAFT Right 10/18/2018    Procedure: AV GRAFT CREATION;  Surgeon: Meir Valencia MD;  Location: [facility] OR 2ND FLR;  Service: Cardiovascular;  Laterality: Right;    ROTATOR CUFF REPAIR Right July 9, 2014    right side    Skull surgery      Aneurysm     stent placed      in vertebral artery    TOTAL REDUCTION MAMMOPLASTY      TUBAL LIGATION       Family History     Problem Relation (Age of Onset)    Bell's palsy Sister    Blindness Maternal Grandmother    Breast cancer Mother    Diabetes Mother, Son,     Heart attack Mother    Heart disease Mother    Hypertension Mother, Sister    Lupus Sister    No Known Problems Daughter    Sleep apnea Sister    Stroke Sister, Maternal Aunt        Tobacco Use    Smoking status: Never Smoker    Smokeless tobacco: Never Used   Substance and Sexual Activity    Alcohol use: Yes     Comment: occasional wine cooler     Drug use: Never    Sexual activity: Yes     Partners: Male     Birth control/protection: Post-menopausal     Review of Systems   Constitutional: Negative for chills and fever.   Respiratory: Negative for chest tightness and shortness of breath.    Cardiovascular: Negative for chest pain.   Gastrointestinal: Negative for abdominal pain.   Musculoskeletal: Negative for arthralgias.   Skin: Negative for color change.   Neurological: Negative for dizziness and headaches.   Psychiatric/Behavioral: Negative for agitation.     Objective:     Vital Signs (Most Recent):  Temp: 98.5 °F (36.9 °C) (12/04/19 1034)  Pulse: 78 (12/04/19 1459)  Resp: 16 (12/04/19 1459)  BP: (!) 143/67 (12/04/19 1458)  SpO2: 96 % (12/04/19 1459) Vital Signs (24h Range):  Temp:  [98.5 °F (36.9 °C)] 98.5 °F (36.9 °C)  Pulse:  [72-78] 78  Resp:  [16-18] 16  SpO2:  [93 %-97 %] 96 %  BP: (127-151)/(62-82) 143/67     Weight: 86 kg (189 lb 9.5 oz)  Body mass index is 33.59 kg/m².    Physical Exam   Constitutional: She is oriented to person, place, and time. She appears well-developed and well-nourished.   Cardiovascular: Normal rate.   Pulmonary/Chest: Effort normal.   Musculoskeletal:   RUE AVG without palpable thrill or pulsatility. 2+ R radial pulse   Neurological: She is alert and oriented to person, place, and time.   Nursing note and vitals  reviewed.      Significant Labs:  CBC:   Recent Labs   Lab 12/04/19  1253   WBC 6.81   RBC 2.62*   HGB 8.5*   HCT 27.8*      *   MCH 32.4*   MCHC 30.6*     CMP:   Recent Labs   Lab 12/04/19  1253   *   CALCIUM 7.9*   ALBUMIN 3.4*   PROT 7.1      K 5.8*   CO2 25   CL 99   *   CREATININE 12.3*   ALKPHOS 97   ALT 7*   AST 7*   BILITOT 0.4       Significant Diagnostics:  I have reviewed all pertinent imaging results/findings within the past 24 hours.    Assessment/Plan:     ESRD (end stage renal disease)  58yo female with DM2, ESRD TTHS, CVA with residual L sided hemiparesis, seizures, and depression who presents to the ED for dialysis access    - recommend admission to medicine for dialysis  - unfortunately we are unable to offer declot tomorrow due to scheduling - will plan on declot/possible permacath Friday; may require temporary line per medicine team if requires dialysis before then  - please make NPO at midnight in case scheduling opens up and we are able to take patient for procedure tomorrow   - rest of care per primary team/nephrology  - will continue to follow along, please call with questions        Thank you for your consult. I will follow-up with patient. Please contact us if you have any additional questions.    Mariah Brown MD  Vascular Surgery  [facility]

## 2019-12-05 NOTE — ASSESSMENT & PLAN NOTE
60yo female with DM2, ESRD TTHS, CVA with residual L sided hemiparesis, seizures, and depression who presents to the ED for dialysis access    - recommend admission to medicine for dialysis  - unfortunately we are unable to offer declot tomorrow due to scheduling - will plan on declot/possible permacath Friday; may require temporary line per medicine team if requires dialysis before then  - rest of care per primary team/nephrology  - will continue to follow along, please call with questions

## 2019-12-05 NOTE — PLAN OF CARE
LANE sent hep B panel to [facility] (fx [phone]).    Keira Ferro LMSW  [facility]  [id number]

## 2019-12-05 NOTE — PROGRESS NOTES
[facility] Hospital Medicine  Progress Note    Patient Name: Lucy Perez  MRN: [id number]  Patient Class: OP- Observation   Admission Date: 12/4/2019  Length of Stay: 0 days  Attending Physician: Angelo Veronica MD  Primary Care Provider: Beverly Muniz MD    [facility] Medicine Team: [facility] MED E Tisha Aguilera PA-C    Subjective:     Principal Problem:Hyperkalemia        HPI:  59 year old female with a PMHx of DM2, ESRD TTHS, CVA with residual L sided hemiparesis, seizures, and depression presenting to the ER with  at bedside for dialysis. Patient's last HD session was Saturday 11/30. She has been transitioning from [facility] [place] to [facility] for outpatient HD. Unfortunately, [facility] has not received all the paperwork needed for her to transition dialysis there. She presents to the ER today to have dialysis. At the time of my exam, patient is resting comfortably in bed with  at bedside. She has no complaints. Denies CP, SOB, abdominal pain, N/V/D, fever/chills, dysuria, palpitations, syncope, headache, paresthesias, and new weakness.      HDS on admission, afebrile without leukocytosis. Labs remarkable for chronic anemia, K 5.8, , and phos 7.7.     Overview/Hospital Course:  Patient admitted for hyperkalemia in setting of ESRD on HD. Unfortunately patient is in the process of switching HD centers and she was unable to be dialyzed outpt. K 5.8 on admission, improved to 5.3 with kayexalate/calcium gluconate. Patient's AVG found to be clotted, vascular surgery consulted. Appreciate assistance. Nephrology following alone, currently no urgent need for RRT, but if unable to be declotted in a timely manner, she will need a temporary dialysis catheter.     Interval History: No reported events overnight. Pt seen at bedside this AM resting in NAD with  at bedside. She has no new complaints. Discussed plan of care- vasc surg following with plans to declot hopefully  tomorrow.    Review of Systems   Constitutional: Negative for fatigue and fever.   Respiratory: Negative for cough and shortness of breath.    Cardiovascular: Negative for chest pain, palpitations and leg swelling.   Gastrointestinal: Negative for abdominal pain, diarrhea, nausea and vomiting.   Genitourinary: Positive for decreased urine volume (ESRD). Negative for dysuria and hematuria.   Musculoskeletal: Positive for back pain (chronic) and gait problem (wheelchair bound since CVA).   Skin: Negative for rash and wound.   Neurological: Positive for weakness (chronic L sided weakness s/p CVA). Negative for syncope and headaches.   Psychiatric/Behavioral: Negative for agitation. The patient is not nervous/anxious.      Objective:     Vital Signs (Most Recent):  Temp: 98.3 °F (36.8 °C) (12/05/19 0819)  Pulse: 73 (12/05/19 0819)  Resp: (!) 22 (12/05/19 0819)  BP: (!) 112/57 (12/05/19 0819)  SpO2: 99 % (12/05/19 0819) Vital Signs (24h Range):  Temp:  [98 °F (36.7 °C)-98.6 °F (37 °C)] 98.3 °F (36.8 °C)  Pulse:  [72-83] 73  Resp:  [16-22] 22  SpO2:  [93 %-99 %] 99 %  BP: (112-151)/(57-76) 112/57     Weight: 89.7 kg (197 lb 11.2 oz)  Body mass index is 35.02 kg/m².    Intake/Output Summary (Last 24 hours) at 12/5/2019 1048  Last data filed at 12/5/2019 0400  Gross per 24 hour   Intake 60 ml   Output 0 ml   Net 60 ml      Physical Exam   Constitutional: She is oriented to person, place, and time. She appears well-developed and well-nourished. No distress.   HENT:   Head: Normocephalic and atraumatic.   Eyes: EOM are normal. Right eye exhibits no discharge. Left eye exhibits no discharge.   Neck: Normal range of motion. Neck supple.   Cardiovascular: Normal rate, regular rhythm, normal heart sounds and intact distal pulses.   Pulmonary/Chest: Effort normal. No respiratory distress.   Abdominal: Soft. Bowel sounds are normal. She exhibits no distension. There is no tenderness.   Musculoskeletal: Normal range of motion. She  exhibits no edema or tenderness.   Neurological: She is alert and oriented to person, place, and time.   Chronic L sided hemiparesis   Skin: Skin is warm and dry. She is not diaphoretic.   RUE AVG, no palpable thrill   Psychiatric: She has a normal mood and affect. Her behavior is normal.       Significant Labs: All pertinent labs within the past 24 hours have been reviewed.    Significant Imaging: I have reviewed all pertinent imaging results/findings within the past 24 hours.      Assessment/Plan:      * Hyperkalemia  ESRD TTHS (last HD 11/30)  Anemia in ESRD  Acute on chronic HFpEF   Problem with vascular access  - HDS on admission, stable on RA  - Labs remarkable for mild hyperkalemia 5.8, phos 7.7  - Nephrology consulted for maintenance HD  - AVG to RUE without bruit or thrill; vascular surg consulted, appreciate assistance   12/5: vasc surg planning on declot/possible permacath for tomorrow 12/6   Will increase renvela in the mean time from 800mg TIDWM to 1600TIDWM (phos 8.8this AM)  - Will give kayexalate x1 > repeat K 5.3  - Renal/DM diet; NPO at MN as precaution  - Strict I/Os, daily weights  *CM/SW to fax additional paperwork to [facility]     Type 2 diabetes mellitus with chronic kidney disease on chronic dialysis, with long-term current use of insulin  - Last A1c 6.8 in 8/2019; repeat 7.8 today  -  on admit; patient endorses not taking home insulin  - 13U detemir qdaily, aspart 4U TID WM, low dose SSI  - Renal/DM diet  - Monitor BGs and adjust insulin PRN  12/5: BG improved with insulin, 84-130s  Discontinue prandial insulin  monitor for hypoglycemia    History of stroke  L sided hemiparesis  HLD  Wheelchair bound  - Continue ASA and lipitor    Partial symptomatic epilepsy with complex partial seizures, not intractable, without status epilepticus  - No recent seizure activity  - Patient reports taking keppra 500 mg BID on HD days and 100 mg daily on other days  - Will continue    Mixed anxiety and  depressive disorder  - Chronic and stable  - Patient and  are unsure if taking celexa or prozac at home; daughter to confirm and notify    VTE Risk Mitigation (From admission, onward)         Ordered     heparin (porcine) injection 5,000 Units  Every 8 hours      12/04/19 1501     Place sequential compression device  Until discontinued      12/04/19 1501     Place LUIS hose  Until discontinued      12/04/19 1501     IP VTE HIGH RISK PATIENT  Once      12/04/19 1501                      Tisha Aguilera PA-C  Department of Hospital Medicine   [facility]

## 2019-12-05 NOTE — PROGRESS NOTES
[facility]  Nephrology  Progress Note    Patient Name: Lucy Perez  MRN: [id number]  Admission Date: 12/4/2019  Hospital Length of Stay: 0 days  Attending Provider: Angelo Veronica MD   Primary Care Physician: Beverly Muniz MD  Principal Problem:Hyperkalemia    Subjective:     Interval History:   Seen by vascular surgery yesterday with plans for declot/permacath tomorrow.  She complains of back pain this morning, otherwise with good appetite and no signs of uremia on examination.  Mild hyperkalemia on AM labs (5.3), without other major electrolyte abnormalities.  Oxygenating well on RA with no complaints of SOB.    Review of patient's allergies indicates:   Allergen Reactions    Lisinopril Other (See Comments)     Angioedema      Vicodin [hydrocodone-acetaminophen] Rash     No problem with acetaminophen      Current Facility-Administered Medications   Medication Frequency    acetaminophen tablet 650 mg Q6H PRN    albuterol inhaler 2 puff Q6H PRN    albuterol-ipratropium 2.5 mg-0.5 mg/3 mL nebulizer solution 3 mL Q4H PRN    aspirin EC tablet 81 mg QAM    atorvastatin tablet 40 mg Daily    bisacodyl suppository 10 mg Daily PRN    dextrose 10% (D10W) Bolus PRN    dextrose 10% (D10W) Bolus PRN    folic acid tablet 1 mg Daily    heparin (porcine) injection 5,000 Units Q8H    levETIRAcetam tablet 1,000 mg Daily    melatonin tablet 3 mg Nightly PRN    ondansetron disintegrating tablet 8 mg Q8H PRN    ondansetron injection 4 mg Q8H PRN    sevelamer carbonate tablet 1,600 mg TID WM    sodium chloride 0.9% flush 3 mL Q8H       Objective:     Vital Signs (Most Recent):  Temp: 98.3 °F (36.8 °C) (12/05/19 0819)  Pulse: 69 (12/05/19 1117)  Resp: (!) 22 (12/05/19 0819)  BP: (!) 112/57 (12/05/19 0819)  SpO2: 99 % (12/05/19 0819)  O2 Device (Oxygen Therapy): room air (12/04/19 2300) Vital Signs (24h Range):  Temp:  [98 °F (36.7 °C)-98.6 °F (37 °C)] 98.3 °F (36.8 °C)  Pulse:   [69-83] 69  Resp:  [16-22] 22  SpO2:  [93 %-99 %] 99 %  BP: (112-151)/(57-76) 112/57     Weight: 89.7 kg (197 lb 11.2 oz) (19 0400)  Body mass index is 35.02 kg/m².  Body surface area is 2 meters squared.    I/O last 3 completed shifts:  In: 60 [P.O.:60]  Out: 0     Physical Exam   Constitutional: She is oriented to person, place, and time. She appears well-developed. No distress.   HENT:   Head: Normocephalic and atraumatic.   Right Ear: External ear normal.   Left Ear: External ear normal.   Eyes: Conjunctivae and EOM are normal. Right eye exhibits no discharge. Left eye exhibits no discharge. No scleral icterus.   Neck: Normal range of motion. Neck supple.   Cardiovascular: Normal rate and regular rhythm. Exam reveals no gallop and no friction rub.   No murmur heard.  Pulmonary/Chest: Effort normal and breath sounds normal. No respiratory distress. She has no wheezes. She has no rales.   Abdominal: Soft. Bowel sounds are normal. She exhibits no distension. There is no tenderness.   Musculoskeletal: She exhibits no edema.   Neurological: She is alert and oriented to person, place, and time.   Skin: Skin is warm and dry. She is not diaphoretic.   CAROLE AVG  Negative bruit/thrill       Significant Labs:  CBC:   Recent Labs   Lab 19  0814   WBC 6.07   RBC 2.81*   HGB 9.1*   HCT 29.4*      *   MCH 32.4*   MCHC 31.0*     CMP:   Recent Labs   Lab 19  1253 19  0814   * 84   CALCIUM 7.9* 7.9*   ALBUMIN 3.4* 3.4*   PROT 7.1  --     147*   K 5.8* 5.3*   CO2 25 26   CL 99 99   * 109*   CREATININE 12.3* 13.2*   ALKPHOS 97  --    ALT 7*  --    AST 7*  --    BILITOT 0.4  --             Assessment/Plan:     ESRD (end stage renal disease)  ESRD on iHD     60 yo female presenting to the hospital for hemodialysis.  Was scheduled to start at a new dialysis unit on Monday, but her hepatitis labs were  and the new unit was unable to accept her.  Her last hemodialysis  treatment was on 11/30 and she presented today for evaluation.  On admission she was found to have a clotted AVG.    Plan/Recommendations:  -mild hyperkalemia on AM labs, other electrolytes stable  -no overt signs of uremia on examination.  -plans for permacath/declot tomorrow per vascular.  -No urgent need for temporary dialysis line placement for RRT today  -recommend repeating potassium this afternoon and medically treating as needed.  -plan for HD tomorrow after access intervention.      Will discuss with Staff, Dr. Ramsay.      Kevin Mcbride, ROHAN  Nephrology  [facility]

## 2019-12-05 NOTE — UM SECONDARY REVIEW
Physician Advisor External    PA - Medical Necessity Issue    Approved Inpatient         CM referred case to [facility] as OBS criteria are no longer met for continued stay.  Plan for patient to remain in house until after vascular declot and HD chair secured.  Awaiting recommendation.    CM received recommendation from Dr. Jody Toledo at [facility] stating inpatient appropriate 12/5/19 due to patient circumstances.  CM communicated above to SEBAS Aguilera PA-C and patient upgraded per physician team orders.      Jovanna Carrizales RN, BSN, CCM  Utilization Review Case Management  [facility]  [phone]  Fax [phone]

## 2019-12-05 NOTE — PROGRESS NOTES
Pt this morning refused both long acting and short acting insulins. She said she will take both insulins for lunch. Pt CBG this morning was 86 related to pt being NPO. Pt c/o headache and back ache. Tylenol given.

## 2019-12-05 NOTE — PLAN OF CARE
Problem: Fall Injury Risk  Goal: Absence of Fall and Fall-Related Injury  Outcome: Ongoing, Progressing     Problem: Adult Inpatient Plan of Care  Goal: Plan of Care Review  Outcome: Ongoing, Progressing  Goal: Patient-Specific Goal (Individualization)  Outcome: Ongoing, Progressing  Goal: Absence of Hospital-Acquired Illness or Injury  Outcome: Ongoing, Progressing  Goal: Optimal Comfort and Wellbeing  Outcome: Ongoing, Progressing  Goal: Readiness for Transition of Care  Outcome: Ongoing, Progressing  Goal: Rounds/Family Conference  Outcome: Ongoing, Progressing     Problem: Infection  Goal: Infection Symptom Resolution  Outcome: Ongoing, Progressing     Problem: Diabetes Comorbidity  Goal: Blood Glucose Level Within Desired Range  Outcome: Ongoing, Progressing     Problem: Skin Injury Risk Increased  Goal: Skin Health and Integrity  Outcome: Ongoing, Progressing   Patient rested this shift without any complications. BS remains WNL. No complaints of pain/discomfort. Family at bedside. Srx2, bed low, CB near.

## 2019-12-05 NOTE — PLAN OF CARE
12/05/19 1030   Discharge Assessment   Assessment Type Discharge Planning Assessment   Confirmed/corrected address and phone number on facesheet? Yes   Assessment information obtained from? Patient   Expected Length of Stay (days) 2   Communicated expected length of stay with patient/caregiver yes   Prior to hospitilization cognitive status: Alert/Oriented   Prior to hospitalization functional status: Partially Dependent   Current cognitive status: Alert/Oriented   Current Functional Status: Partially Dependent   Lives With spouse  (spouse, Aleksandra Perez ([phone]))   Able to Return to Prior Arrangements yes   Is patient able to care for self after discharge? Unable to determine at this time (comments);Yes  (with assistance)   Patient's perception of discharge disposition home or selfcare;other (comments)  (pt currently participating in [facility] ([phone]))   Readmission Within the Last 30 Days no previous admission in last 30 days   Patient currently being followed by outpatient case management? No   Patient currently receives any other outside agency services? No   Equipment Currently Used at Home wheelchair;walker, yulissa;hospital bed;glucometer;grab bar;shower chair;lift device;other (see comments)  (BP machine)   Do you have any problems affording any of your prescribed medications? No   Is the patient taking medications as prescribed? yes   Does the patient have transportation home? Yes   Transportation Anticipated family or friend will provide  (spouse)   Dialysis Name and Scheduled days Patient changing from [facility] to [facility] (TTS) via RUE AVF   Discharge Plan A Other  ([facility])   Discharge Plan B Home Health   DME Needed Upon Discharge  none   Patient/Family in Agreement with Plan yes     Dx: hyperkalemia  Consult: vasc surg  Pharm: [facility]  Hosp f/u appt: ROHAN Yadav on 12/18/19 at 1330    CM informed [facility] that the patient  missed her appointment today due to her hospitalization. Patient to have CRIS CARL declotted tomorrow followed by her HD treatment. Hep B panel needed to get established at [facility] obtained during this hospitalization & faxed by [facility]. Will continue to follow.

## 2019-12-05 NOTE — ASSESSMENT & PLAN NOTE
ESRD on iHD     60 yo female presenting to the hospital for hemodialysis.  Was scheduled to start at a new dialysis unit on Monday, but her hepatitis labs were  and the new unit was unable to accept her.  Her last hemodialysis treatment was on  and she presented today for evaluation.  On admission she was found to have a clotted AVG.    Plan/Recommendations:  -mild hyperkalemia on AM labs, other electrolytes stable  -no overt signs of uremia on examination.  -plans for permacath/declot tomorrow per vascular.  -No urgent need for temporary dialysis line placement for RRT today  -recommend repeating potassium this afternoon and medically treating as needed.  -plan for HD tomorrow after access intervention.      Will discuss with Staff, Dr. Ramsay.

## 2019-12-05 NOTE — HOSPITAL COURSE
Patient admitted for hyperkalemia in setting of ESRD on HD. Unfortunately patient is in the process of switching HD centers and she was unable to be dialyzed outpt. K 5.8 on admission, improved to 5.3 with kayexalate/calcium gluconate. Patient's AVG found to be clotted, vascular surgery consulted. Appreciate assistance, pt declotted with Vasc Surg 12/6. Nephrology following as well. Pt had bleeding with HD after declot, hgb 7.6 12/7. Discussed with nephrology- will give EPO in HD. T/S&C as precaution. No recurrent bleeding on HD. 12/8 hgb 6.7, transfuse 1 unit and monitor. Repeat hgb 7.6. Pt is stable for d/c with return precautions discussed. CM/SW confirmed patient is set up to dialyze at [facility] outpt. No further questions at d/c.

## 2019-12-05 NOTE — ASSESSMENT & PLAN NOTE
ESRD TTHS (last HD 11/30)  Anemia in ESRD  Acute on chronic HFpEF   Problem with vascular access  - HDS on admission, stable on RA  - Labs remarkable for mild hyperkalemia 5.8, phos 7.7  - Nephrology consulted for maintenance HD  - AVG to RUE without bruit or thrill; vascular surg consulted, appreciate assistance   12/5: vasc surg planning on declot/possible permacath for tomorrow 12/6  - Will give kayexalate x1 > repeat K 5.3  - Renal/DM diet; NPO at [facility] as precaution  - Strict I/Os, daily weights  *CM/SW to fax additional paperwork to [facility]

## 2019-12-05 NOTE — ASSESSMENT & PLAN NOTE
ESRD TTHS (last HD 11/30)  Anemia in ESRD  Acute on chronic HFpEF   Problem with vascular access  - HDS on admission, stable on RA  - Labs remarkable for mild hyperkalemia 5.8, phos 7.7  - Nephrology consulted for maintenance HD  - AVG to RUE without bruit or thrill; vascular surg consulted, appreciate assistance  - Will give kayexalate x1 > repeat K 5.3  -  increase renvela in the mean time from 800mg TIDWM to 1600TIDWM (12/5)  12/6: K+ 5.9; shifted; repeat K+ this PM  - fistulogram today per vasc surg  - Renal/DM diet when eating  - Strict I/Os, daily weights  *CM/SW to fax additional paperwork to [facility]; completed, pt is set up for HD outpt at [facility]

## 2019-12-06 ENCOUNTER — ANESTHESIA EVENT (OUTPATIENT)
Dept: SURGERY | Facility: HOSPITAL | Age: 59
DRG: 628 | End: 2019-12-06
Payer: MEDICARE

## 2019-12-06 ENCOUNTER — ANESTHESIA (OUTPATIENT)
Dept: SURGERY | Facility: HOSPITAL | Age: 59
DRG: 628 | End: 2019-12-06
Payer: MEDICARE

## 2019-12-06 LAB
ALBUMIN SERPL BCP-MCNC: 3.3 G/DL (ref 3.5–5.2)
ANION GAP SERPL CALC-SCNC: 21 MMOL/L (ref 8–16)
ANION GAP SERPL CALC-SCNC: 24 MMOL/L (ref 8–16)
ANION GAP SERPL CALC-SCNC: 25 MMOL/L (ref 8–16)
BASOPHILS # BLD AUTO: 0.03 K/UL (ref 0–0.2)
BASOPHILS NFR BLD: 0.5 % (ref 0–1.9)
BUN SERPL-MCNC: 115 MG/DL (ref 8–23)
BUN SERPL-MCNC: 119 MG/DL (ref 8–23)
BUN SERPL-MCNC: 122 MG/DL (ref 8–23)
CALCIUM SERPL-MCNC: 7.5 MG/DL (ref 8.7–10.5)
CALCIUM SERPL-MCNC: 7.5 MG/DL (ref 8.7–10.5)
CALCIUM SERPL-MCNC: 7.6 MG/DL (ref 8.7–10.5)
CHLORIDE SERPL-SCNC: 101 MMOL/L (ref 95–110)
CHLORIDE SERPL-SCNC: 102 MMOL/L (ref 95–110)
CHLORIDE SERPL-SCNC: 99 MMOL/L (ref 95–110)
CO2 SERPL-SCNC: 20 MMOL/L (ref 23–29)
CO2 SERPL-SCNC: 21 MMOL/L (ref 23–29)
CO2 SERPL-SCNC: 26 MMOL/L (ref 23–29)
CREAT SERPL-MCNC: 13.9 MG/DL (ref 0.5–1.4)
CREAT SERPL-MCNC: 14.1 MG/DL (ref 0.5–1.4)
CREAT SERPL-MCNC: 14.1 MG/DL (ref 0.5–1.4)
DIFFERENTIAL METHOD: ABNORMAL
EOSINOPHIL # BLD AUTO: 0.2 K/UL (ref 0–0.5)
EOSINOPHIL NFR BLD: 4 % (ref 0–8)
ERYTHROCYTE [DISTWIDTH] IN BLOOD BY AUTOMATED COUNT: 12.9 % (ref 11.5–14.5)
EST. GFR  (AFRICAN AMERICAN): 2.9 ML/MIN/1.73 M^2
EST. GFR  (NON AFRICAN AMERICAN): 2.5 ML/MIN/1.73 M^2
GLUCOSE SERPL-MCNC: 106 MG/DL (ref 70–110)
GLUCOSE SERPL-MCNC: 172 MG/DL (ref 70–110)
GLUCOSE SERPL-MCNC: 99 MG/DL (ref 70–110)
HCT VFR BLD AUTO: 25.7 % (ref 37–48.5)
HGB BLD-MCNC: 8.2 G/DL (ref 12–16)
IMM GRANULOCYTES # BLD AUTO: 0.05 K/UL (ref 0–0.04)
IMM GRANULOCYTES NFR BLD AUTO: 0.9 % (ref 0–0.5)
LYMPHOCYTES # BLD AUTO: 1.2 K/UL (ref 1–4.8)
LYMPHOCYTES NFR BLD: 20.3 % (ref 18–48)
MCH RBC QN AUTO: 32.3 PG (ref 27–31)
MCHC RBC AUTO-ENTMCNC: 31.9 G/DL (ref 32–36)
MCV RBC AUTO: 101 FL (ref 82–98)
MONOCYTES # BLD AUTO: 0.5 K/UL (ref 0.3–1)
MONOCYTES NFR BLD: 8.7 % (ref 4–15)
NEUTROPHILS # BLD AUTO: 3.8 K/UL (ref 1.8–7.7)
NEUTROPHILS NFR BLD: 65.6 % (ref 38–73)
NRBC BLD-RTO: 0 /100 WBC
PHOSPHATE SERPL-MCNC: 8.3 MG/DL (ref 2.7–4.5)
PLATELET # BLD AUTO: 172 K/UL (ref 150–350)
PMV BLD AUTO: 10.5 FL (ref 9.2–12.9)
POCT GLUCOSE: 106 MG/DL (ref 70–110)
POCT GLUCOSE: 113 MG/DL (ref 70–110)
POCT GLUCOSE: 164 MG/DL (ref 70–110)
POCT GLUCOSE: 170 MG/DL (ref 70–110)
POCT GLUCOSE: 174 MG/DL (ref 70–110)
POTASSIUM SERPL-SCNC: 4.1 MMOL/L (ref 3.5–5.1)
POTASSIUM SERPL-SCNC: 4.2 MMOL/L (ref 3.5–5.1)
POTASSIUM SERPL-SCNC: 5.9 MMOL/L (ref 3.5–5.1)
POTASSIUM SERPL-SCNC: 6.7 MMOL/L (ref 3.5–5.1)
POTASSIUM SERPL-SCNC: 6.7 MMOL/L (ref 3.5–5.1)
RBC # BLD AUTO: 2.54 M/UL (ref 4–5.4)
SODIUM SERPL-SCNC: 146 MMOL/L (ref 136–145)
SODIUM SERPL-SCNC: 146 MMOL/L (ref 136–145)
SODIUM SERPL-SCNC: 147 MMOL/L (ref 136–145)
WBC # BLD AUTO: 5.75 K/UL (ref 3.9–12.7)

## 2019-12-06 PROCEDURE — 63600175 PHARM REV CODE 636 W HCPCS: Mod: HCNC,NTX | Performed by: PHYSICIAN ASSISTANT

## 2019-12-06 PROCEDURE — C1887 CATHETER, GUIDING: HCPCS | Mod: HCNC,NTX | Performed by: SURGERY

## 2019-12-06 PROCEDURE — D9220A PRA ANESTHESIA: Mod: HCNC,CRNA,NTX, | Performed by: NURSE ANESTHETIST, CERTIFIED REGISTERED

## 2019-12-06 PROCEDURE — 25000003 PHARM REV CODE 250: Mod: HCNC,NTX | Performed by: SURGERY

## 2019-12-06 PROCEDURE — 82962 GLUCOSE BLOOD TEST: CPT | Mod: HCNC,NTX | Performed by: SURGERY

## 2019-12-06 PROCEDURE — 84132 ASSAY OF SERUM POTASSIUM: CPT | Mod: 91,HCNC,NTX

## 2019-12-06 PROCEDURE — 71000015 HC POSTOP RECOV 1ST HR: Mod: HCNC,NTX | Performed by: SURGERY

## 2019-12-06 PROCEDURE — D9220A PRA ANESTHESIA: ICD-10-PCS | Mod: HCNC,ANES,NTX, | Performed by: ANESTHESIOLOGY

## 2019-12-06 PROCEDURE — 63600175 PHARM REV CODE 636 W HCPCS: Mod: JG,HCNC,NTX | Performed by: SURGERY

## 2019-12-06 PROCEDURE — 85025 COMPLETE CBC W/AUTO DIFF WBC: CPT | Mod: HCNC,NTX

## 2019-12-06 PROCEDURE — 99233 PR SUBSEQUENT HOSPITAL CARE,LEVL III: ICD-10-PCS | Mod: HCNC,NTX,, | Performed by: NURSE PRACTITIONER

## 2019-12-06 PROCEDURE — 63600175 PHARM REV CODE 636 W HCPCS: Mod: HCNC,NTX | Performed by: NURSE ANESTHETIST, CERTIFIED REGISTERED

## 2019-12-06 PROCEDURE — 36415 COLL VENOUS BLD VENIPUNCTURE: CPT | Mod: HCNC,NTX

## 2019-12-06 PROCEDURE — 37000009 HC ANESTHESIA EA ADD 15 MINS: Mod: HCNC,NTX | Performed by: SURGERY

## 2019-12-06 PROCEDURE — 25000003 PHARM REV CODE 250: Mod: HCNC,NTX | Performed by: NURSE PRACTITIONER

## 2019-12-06 PROCEDURE — 94761 N-INVAS EAR/PLS OXIMETRY MLT: CPT | Mod: HCNC,NTX

## 2019-12-06 PROCEDURE — 36000706: Mod: HCNC,NTX | Performed by: SURGERY

## 2019-12-06 PROCEDURE — 25000003 PHARM REV CODE 250: Mod: HCNC,NTX | Performed by: STUDENT IN AN ORGANIZED HEALTH CARE EDUCATION/TRAINING PROGRAM

## 2019-12-06 PROCEDURE — 99233 PR SUBSEQUENT HOSPITAL CARE,LEVL III: ICD-10-PCS | Mod: HCNC,NTX,, | Performed by: PHYSICIAN ASSISTANT

## 2019-12-06 PROCEDURE — 94640 AIRWAY INHALATION TREATMENT: CPT | Mod: HCNC,NTX

## 2019-12-06 PROCEDURE — 36905 PR MECH THROMBECTOMY/INFUS, DIALYSIS CIRCUIT W/ TRANSLML BALLOON ANGIO: ICD-10-PCS | Mod: HCNC,NTX,, | Performed by: SURGERY

## 2019-12-06 PROCEDURE — C1725 CATH, TRANSLUMIN NON-LASER: HCPCS | Mod: HCNC,NTX | Performed by: SURGERY

## 2019-12-06 PROCEDURE — 99233 SBSQ HOSP IP/OBS HIGH 50: CPT | Mod: HCNC,NTX,, | Performed by: NURSE PRACTITIONER

## 2019-12-06 PROCEDURE — 25000242 PHARM REV CODE 250 ALT 637 W/ HCPCS: Mod: HCNC,NTX | Performed by: NURSE PRACTITIONER

## 2019-12-06 PROCEDURE — 99233 SBSQ HOSP IP/OBS HIGH 50: CPT | Mod: HCNC,NTX,, | Performed by: PHYSICIAN ASSISTANT

## 2019-12-06 PROCEDURE — 27201423 OPTIME MED/SURG SUP & DEVICES STERILE SUPPLY: Mod: HCNC,NTX | Performed by: SURGERY

## 2019-12-06 PROCEDURE — 80048 BASIC METABOLIC PNL TOTAL CA: CPT | Mod: 91,HCNC,NTX

## 2019-12-06 PROCEDURE — 71000044 HC DOSC ROUTINE RECOVERY FIRST HOUR: Mod: HCNC,NTX | Performed by: SURGERY

## 2019-12-06 PROCEDURE — 36000707: Mod: HCNC,NTX | Performed by: SURGERY

## 2019-12-06 PROCEDURE — 80069 RENAL FUNCTION PANEL: CPT | Mod: HCNC,NTX

## 2019-12-06 PROCEDURE — A4216 STERILE WATER/SALINE, 10 ML: HCPCS | Mod: HCNC,NTX | Performed by: PHYSICIAN ASSISTANT

## 2019-12-06 PROCEDURE — 63600175 PHARM REV CODE 636 W HCPCS: Mod: HCNC,NTX | Performed by: NURSE PRACTITIONER

## 2019-12-06 PROCEDURE — D9220A PRA ANESTHESIA: ICD-10-PCS | Mod: HCNC,CRNA,NTX, | Performed by: NURSE ANESTHETIST, CERTIFIED REGISTERED

## 2019-12-06 PROCEDURE — 25500020 PHARM REV CODE 255: Mod: HCNC,NTX | Performed by: SURGERY

## 2019-12-06 PROCEDURE — 80100014 HC HEMODIALYSIS 1:1: Mod: HCNC,NTX

## 2019-12-06 PROCEDURE — 25000003 PHARM REV CODE 250: Mod: HCNC,NTX | Performed by: PHYSICIAN ASSISTANT

## 2019-12-06 PROCEDURE — C1769 GUIDE WIRE: HCPCS | Mod: HCNC,NTX | Performed by: SURGERY

## 2019-12-06 PROCEDURE — 11000001 HC ACUTE MED/SURG PRIVATE ROOM: Mod: HCNC,NTX

## 2019-12-06 PROCEDURE — C1894 INTRO/SHEATH, NON-LASER: HCPCS | Mod: HCNC,NTX | Performed by: SURGERY

## 2019-12-06 PROCEDURE — 80048 BASIC METABOLIC PNL TOTAL CA: CPT | Mod: HCNC,NTX

## 2019-12-06 PROCEDURE — 37000008 HC ANESTHESIA 1ST 15 MINUTES: Mod: HCNC,NTX | Performed by: SURGERY

## 2019-12-06 PROCEDURE — D9220A PRA ANESTHESIA: Mod: HCNC,ANES,NTX, | Performed by: ANESTHESIOLOGY

## 2019-12-06 PROCEDURE — C1757 CATH, THROMBECTOMY/EMBOLECT: HCPCS | Mod: HCNC,NTX | Performed by: SURGERY

## 2019-12-06 PROCEDURE — 36905 THRMBC/NFS DIALYSIS CIRCUIT: CPT | Mod: HCNC,NTX,, | Performed by: SURGERY

## 2019-12-06 RX ORDER — SODIUM CHLORIDE 9 MG/ML
INJECTION, SOLUTION INTRAVENOUS CONTINUOUS PRN
Status: DISCONTINUED | OUTPATIENT
Start: 2019-12-06 | End: 2019-12-06

## 2019-12-06 RX ORDER — MIDAZOLAM HYDROCHLORIDE 1 MG/ML
INJECTION, SOLUTION INTRAMUSCULAR; INTRAVENOUS
Status: DISCONTINUED | OUTPATIENT
Start: 2019-12-06 | End: 2019-12-06

## 2019-12-06 RX ORDER — HYDRALAZINE HYDROCHLORIDE 25 MG/1
25 TABLET, FILM COATED ORAL EVERY 8 HOURS PRN
Status: DISCONTINUED | OUTPATIENT
Start: 2019-12-06 | End: 2019-12-09 | Stop reason: HOSPADM

## 2019-12-06 RX ORDER — LIDOCAINE 50 MG/G
1 PATCH TOPICAL
Status: DISCONTINUED | OUTPATIENT
Start: 2019-12-06 | End: 2019-12-09 | Stop reason: HOSPADM

## 2019-12-06 RX ORDER — IODIXANOL 320 MG/ML
INJECTION, SOLUTION INTRAVASCULAR
Status: DISCONTINUED | OUTPATIENT
Start: 2019-12-06 | End: 2019-12-06 | Stop reason: HOSPADM

## 2019-12-06 RX ORDER — HEPARIN SODIUM 1000 [USP'U]/ML
INJECTION, SOLUTION INTRAVENOUS; SUBCUTANEOUS
Status: DISCONTINUED | OUTPATIENT
Start: 2019-12-06 | End: 2019-12-06 | Stop reason: HOSPADM

## 2019-12-06 RX ORDER — OXYCODONE HYDROCHLORIDE 5 MG/1
5 TABLET ORAL ONCE
Status: COMPLETED | OUTPATIENT
Start: 2019-12-06 | End: 2019-12-06

## 2019-12-06 RX ORDER — PROPOFOL 10 MG/ML
VIAL (ML) INTRAVENOUS CONTINUOUS PRN
Status: DISCONTINUED | OUTPATIENT
Start: 2019-12-06 | End: 2019-12-06

## 2019-12-06 RX ORDER — ONDANSETRON 2 MG/ML
4 INJECTION INTRAMUSCULAR; INTRAVENOUS ONCE
Status: DISCONTINUED | OUTPATIENT
Start: 2019-12-06 | End: 2019-12-09 | Stop reason: HOSPADM

## 2019-12-06 RX ORDER — HYDROMORPHONE HYDROCHLORIDE 1 MG/ML
0.2 INJECTION, SOLUTION INTRAMUSCULAR; INTRAVENOUS; SUBCUTANEOUS EVERY 5 MIN PRN
Status: DISCONTINUED | OUTPATIENT
Start: 2019-12-06 | End: 2019-12-09 | Stop reason: HOSPADM

## 2019-12-06 RX ORDER — CEFAZOLIN SODIUM 1 G/3ML
INJECTION, POWDER, FOR SOLUTION INTRAMUSCULAR; INTRAVENOUS
Status: DISCONTINUED | OUTPATIENT
Start: 2019-12-06 | End: 2019-12-06

## 2019-12-06 RX ORDER — FENTANYL CITRATE 50 UG/ML
INJECTION, SOLUTION INTRAMUSCULAR; INTRAVENOUS
Status: DISCONTINUED | OUTPATIENT
Start: 2019-12-06 | End: 2019-12-06

## 2019-12-06 RX ORDER — ALBUTEROL SULFATE 2.5 MG/.5ML
10 SOLUTION RESPIRATORY (INHALATION) ONCE
Status: DISCONTINUED | OUTPATIENT
Start: 2019-12-06 | End: 2019-12-06

## 2019-12-06 RX ORDER — DIPHENHYDRAMINE HYDROCHLORIDE 50 MG/ML
25 INJECTION INTRAMUSCULAR; INTRAVENOUS EVERY 6 HOURS PRN
Status: DISCONTINUED | OUTPATIENT
Start: 2019-12-06 | End: 2019-12-09 | Stop reason: HOSPADM

## 2019-12-06 RX ORDER — PHENYLEPHRINE HYDROCHLORIDE 10 MG/ML
INJECTION INTRAVENOUS
Status: DISCONTINUED | OUTPATIENT
Start: 2019-12-06 | End: 2019-12-06

## 2019-12-06 RX ORDER — SODIUM CHLORIDE 9 MG/ML
INJECTION, SOLUTION INTRAVENOUS ONCE
Status: DISCONTINUED | OUTPATIENT
Start: 2019-12-06 | End: 2019-12-09 | Stop reason: HOSPADM

## 2019-12-06 RX ORDER — TRAMADOL HYDROCHLORIDE 50 MG/1
50 TABLET ORAL ONCE
Status: COMPLETED | OUTPATIENT
Start: 2019-12-06 | End: 2019-12-06

## 2019-12-06 RX ORDER — HEPARIN SODIUM 1000 [USP'U]/ML
INJECTION, SOLUTION INTRAVENOUS; SUBCUTANEOUS
Status: DISCONTINUED | OUTPATIENT
Start: 2019-12-06 | End: 2019-12-06

## 2019-12-06 RX ORDER — ALBUTEROL SULFATE 2.5 MG/.5ML
10 SOLUTION RESPIRATORY (INHALATION) ONCE
Status: COMPLETED | OUTPATIENT
Start: 2019-12-06 | End: 2019-12-06

## 2019-12-06 RX ORDER — LIDOCAINE HCL/PF 100 MG/5ML
SYRINGE (ML) INTRAVENOUS
Status: DISCONTINUED | OUTPATIENT
Start: 2019-12-06 | End: 2019-12-06

## 2019-12-06 RX ORDER — LIDOCAINE HYDROCHLORIDE 10 MG/ML
INJECTION INFILTRATION; PERINEURAL
Status: DISCONTINUED | OUTPATIENT
Start: 2019-12-06 | End: 2019-12-06 | Stop reason: HOSPADM

## 2019-12-06 RX ORDER — SODIUM CHLORIDE 9 MG/ML
INJECTION, SOLUTION INTRAVENOUS
Status: DISCONTINUED | OUTPATIENT
Start: 2019-12-06 | End: 2019-12-09 | Stop reason: HOSPADM

## 2019-12-06 RX ADMIN — CEFAZOLIN 2 G: 330 INJECTION, POWDER, FOR SOLUTION INTRAMUSCULAR; INTRAVENOUS at 05:12

## 2019-12-06 RX ADMIN — ONDANSETRON 8 MG: 8 TABLET, ORALLY DISINTEGRATING ORAL at 10:12

## 2019-12-06 RX ADMIN — FENTANYL CITRATE 25 MCG: 50 INJECTION, SOLUTION INTRAMUSCULAR; INTRAVENOUS at 06:12

## 2019-12-06 RX ADMIN — SODIUM POLYSTYRENE SULFONATE 30 G: 15 SUSPENSION ORAL; RECTAL at 02:12

## 2019-12-06 RX ADMIN — LIDOCAINE 1 PATCH: 50 PATCH CUTANEOUS at 10:12

## 2019-12-06 RX ADMIN — ALBUTEROL SULFATE 10 MG: 2.5 SOLUTION RESPIRATORY (INHALATION) at 11:12

## 2019-12-06 RX ADMIN — TRAMADOL HYDROCHLORIDE 50 MG: 50 TABLET, FILM COATED ORAL at 07:12

## 2019-12-06 RX ADMIN — MIDAZOLAM HYDROCHLORIDE 1.5 MG: 1 INJECTION, SOLUTION INTRAMUSCULAR; INTRAVENOUS at 04:12

## 2019-12-06 RX ADMIN — LIDOCAINE HYDROCHLORIDE 40 MG: 20 INJECTION, SOLUTION INTRAVENOUS at 05:12

## 2019-12-06 RX ADMIN — ASPIRIN 81 MG: 81 TABLET, COATED ORAL at 10:12

## 2019-12-06 RX ADMIN — SODIUM CHLORIDE: 0.9 INJECTION, SOLUTION INTRAVENOUS at 04:12

## 2019-12-06 RX ADMIN — MIDAZOLAM HYDROCHLORIDE 0.5 MG: 1 INJECTION, SOLUTION INTRAMUSCULAR; INTRAVENOUS at 05:12

## 2019-12-06 RX ADMIN — SODIUM POLYSTYRENE SULFONATE 30 G: 15 SUSPENSION ORAL; RECTAL at 10:12

## 2019-12-06 RX ADMIN — PROPOFOL 50 MCG/KG/MIN: 10 INJECTION, EMULSION INTRAVENOUS at 05:12

## 2019-12-06 RX ADMIN — PHENYLEPHRINE HYDROCHLORIDE 200 MCG: 10 INJECTION INTRAVENOUS at 05:12

## 2019-12-06 RX ADMIN — HEPARIN SODIUM 5000 UNITS: 5000 INJECTION, SOLUTION INTRAVENOUS; SUBCUTANEOUS at 02:12

## 2019-12-06 RX ADMIN — OXYCODONE HYDROCHLORIDE 5 MG: 5 TABLET ORAL at 08:12

## 2019-12-06 RX ADMIN — INSULIN HUMAN 9 UNITS: 100 INJECTION, SOLUTION PARENTERAL at 01:12

## 2019-12-06 RX ADMIN — PHENYLEPHRINE HYDROCHLORIDE 200 MCG: 10 INJECTION INTRAVENOUS at 06:12

## 2019-12-06 RX ADMIN — HEPARIN SODIUM 5000 UNITS: 5000 INJECTION, SOLUTION INTRAVENOUS; SUBCUTANEOUS at 05:12

## 2019-12-06 RX ADMIN — Medication 3 ML: at 02:12

## 2019-12-06 RX ADMIN — LEVETIRACETAM 1000 MG: 500 TABLET ORAL at 10:12

## 2019-12-06 RX ADMIN — SODIUM CHLORIDE: 0.9 INJECTION, SOLUTION INTRAVENOUS at 06:12

## 2019-12-06 RX ADMIN — FENTANYL CITRATE 25 MCG: 50 INJECTION, SOLUTION INTRAMUSCULAR; INTRAVENOUS at 05:12

## 2019-12-06 RX ADMIN — Medication 3 ML: at 10:12

## 2019-12-06 RX ADMIN — ATORVASTATIN CALCIUM 40 MG: 20 TABLET, FILM COATED ORAL at 10:12

## 2019-12-06 RX ADMIN — DEXTROSE 250 ML: 10 SOLUTION INTRAVENOUS at 12:12

## 2019-12-06 RX ADMIN — HEPARIN SODIUM 7000 UNITS: 1000 INJECTION, SOLUTION INTRAVENOUS; SUBCUTANEOUS at 05:12

## 2019-12-06 RX ADMIN — Medication 3 ML: at 05:12

## 2019-12-06 RX ADMIN — PHENYLEPHRINE HYDROCHLORIDE 100 MCG: 10 INJECTION INTRAVENOUS at 05:12

## 2019-12-06 RX ADMIN — FOLIC ACID 1 MG: 1 TABLET ORAL at 10:12

## 2019-12-06 NOTE — PROGRESS NOTES
"[facility]  Vascular Surgery  Progress Note    Patient Name: Lucy Perez  MRN: [id number]  Admission Date: 12/4/2019  Primary Care Provider: Beverly Muniz MD    Subjective:     Interval History: NAEON.  NPO since midnight. VSS.  Afebrile.    Post-Op Info:  Procedure(s) (LRB):  Fistulogram (Right)  DECLOT-GRAFT (Right)  INSERTION, CATHETER, CENTRAL VENOUS, PADILLA (Right)         Medications Prior to Admission   Medication Sig Dispense Refill Last Dose    acetaminophen (TYLENOL) 325 MG tablet Take 2 tablets (650 mg total) by mouth every 6 (six) hours as needed for Pain.  0 12/3/2019    albuterol (VENTOLIN HFA) 90 mcg/actuation inhaler Inhale 2 puffs into the lungs every 6 (six) hours as needed for Wheezing. Rescue 18 g 0 12/3/2019    aspirin (ECOTRIN) 81 MG EC tablet Take 81 mg by mouth every morning.    12/3/2019    atorvastatin (LIPITOR) 40 MG tablet TAKE 1 TABLET ONE TIME DAILY FOR CHOLESTEROL 90 tablet 2 12/3/2019    BD INSULIN PEN NEEDLE UF SHORT 31 gauge x 5/16" Ndle USE TO INJECT NOVOLOG FLEXPEN BEFORE MEALS 150 each 11 12/3/2019    bisacodyl (DULCOLAX) 10 mg Supp Place 1 suppository (10 mg total) rectally daily as needed. 30 suppository 3 12/3/2019    blood sugar diagnostic Strp To check BG 4  times daily, to use with insurance preferred meter-true metrix 400 each 3 12/3/2019    blood-glucose meter kit To check BG 4 times daily, to use with insurance preferred meter-true metrix 1 each 1 12/3/2019    citalopram (CELEXA) 20 MG tablet Take 1 tablet (20 mg total) by mouth once daily. 1 tab daily for depression 30 tablet 4 12/3/2019    ergocalciferol (ERGOCALCIFEROL) 50,000 unit Cap Take 1 capsule (50,000 Units total) by mouth every 7 days.   12/3/2019    famotidine (PEPCID) 20 MG tablet Take 1 tablet (20 mg total) by mouth once daily. 90 tablet 2 12/3/2019    FLUoxetine 10 MG capsule Take 10 mg by mouth once daily.  4 12/3/2019    FLUoxetine 10 MG Tab Take 1 tablet " (10 mg total) by mouth once daily. 30 tablet 4 12/3/2019    glipiZIDE (GLUCOTROL) 2.5 MG TR24 Take 1 tablet (hold if less than 150) at lunch (non dialysis days) 30 tablet 4 12/3/2019    insulin aspart U-100 (NOVOLOG U-100 INSULIN ASPART) 100 unit/mL injection Use correction scale 180-230+1, 231-280+2, 281-330+3, 331-380+4, >380+5, max 15 units. 3 vial 3 12/3/2019    insulin glargine (LANTUS U-100 INSULIN) 100 unit/mL injection Inject 10-12 Units into the skin every evening. 6 mL 6 12/3/2019    lancets Misc 1 lancet by Misc.(Non-Drug; Combo Route) route 2 (two) times daily with meals. Check glucose before meals and bed 300 each 4 12/3/2019    levETIRAcetam (KEPPRA) 1000 MG tablet Take 1 tablet (1,000 mg total) by mouth once daily. 90 tablet 3 12/3/2019    levETIRAcetam (KEPPRA) 500 MG Tab Take 1 tablet (500 mg total) by mouth every Mon, Wed, Fri. Monday Wednesday and Friday after DIALYSIS take additional 500mg 36 tablet 3 12/3/2019    ondansetron (ZOFRAN-ODT) 4 MG TbDL Take 1 tablet (4 mg total) by mouth every 8 (eight) hours as needed. 10 tablet 0 12/3/2019    polyethylene glycol (MIRALAX) 17 gram/dose powder Take 17 g by mouth 2 (two) times daily. 1 Bottle 6 12/3/2019    pregabalin (LYRICA) 25 MG capsule Take 1 capsule (25 mg total) by mouth once daily. May take additional dose after HD. (Patient taking differently: Take 25 mg by mouth daily as needed. May take additional dose after HD.) 90 capsule 4 12/3/2019    sevelamer carbonate (RENVELA) 800 mg Tab Take 800 mg by mouth 3 (three) times daily with meals.   12/3/2019    folic acid (FOLVITE) 1 MG tablet Take 1 tablet (1 mg total) by mouth once daily. 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    Cortical cataract of both eyes 7/26/2016    Decubitus ulcer of buttock, stage 2     Degeneration of lumbar or lumbosacral intervertebral disc 3/5/2013    S/p MRI L-spine 5/2009     Depression     Encounter for blood transfusion     ESRD on hemodialysis     Started August 2018    General anesthetics causing adverse effect in therapeutic use     Hemorrhagic cerebrovascular accident (CVA)     8/2016 s/p Hemicraniotomy at [facility] with Left hemiparesis    History of stroke 6/28/2017    s/p R-MCA stroke with R-putaminal hemorrhagic transformation in 8/2016 and 11/2016 (s/p hemicraniotomy at [facility]) with residual L hemiparesis, on AED s/p CVA      Hypertensive retinopathy of both eyes 7/26/2016    Impingement syndrome of right shoulder 7/2/2014    Obesity     THERESA (obstructive sleep apnea) 3/5/2013    No Home CPAP 2ndary to cost     Partial symptomatic epilepsy with complex partial seizures, not intractable, without status epilepticus     Rheumatoid arthritis(714.0)     Rotator cuff tear 7/2/2014    Sarcoidosis     Stroke 2016    left sided flaccidity, SAH    Vertebral artery stenosis 3/5/2013    S/p Stenting per Dr Burnett      Past Surgical History:   Procedure Laterality Date    BREAST SURGERY      breast reduction    COLONOSCOPY N/A 8/11/2016    Procedure: COLONOSCOPY;  Surgeon: Jerry Vilchis MD;  Location: [facility] ENDO ([address] [place] [address]);  Service: Endoscopy;  Laterality: N/A;  Patient reports difficulty awaking from anesthesia in the past.    DECLOTTING OF VASCULAR GRAFT Right 6/20/2019    Procedure: DECLOT-GRAFT;  Surgeon: Cabrera Irwin MD;  Location: [facility] CATH LAB;  Service: Cardiology;  Laterality: Right;     FISTULOGRAM Right 2/11/2019    Procedure: Fistulogram;  Surgeon: Meir Valencia MD;  Location: [facility] CATH LAB;  Service: Peripheral Vascular;  Laterality: Right;    FISTULOGRAM Right 7/8/2019    Procedure: Fistulogram;  Surgeon: WELLINGTON Palm III, MD;  Location: [facility] CATH LAB;  Service: Peripheral Vascular;  Laterality: Right;    HYSTERECTOMY  1999    JOSE LUIS/BSO (AUB)    PLACEMENT OF ARTERIOVENOUS GRAFT Right 10/18/2018    Procedure: AV GRAFT CREATION;  Surgeon: Meir Valencia MD;  Location: [facility] OR [facility] FLR;  Service: Cardiovascular;  Laterality: Right;    ROTATOR CUFF REPAIR Right July 9, 2014    right side    Skull surgery      Aneurysm    stent placed      in vertebral artery    TOTAL REDUCTION MAMMOPLASTY      TUBAL LIGATION       Family History     Problem Relation (Age of Onset)    Bell's palsy Sister    Blindness Maternal Grandmother    Breast cancer Mother    Diabetes Mother, Son,     Heart attack Mother    Heart disease Mother    Hypertension Mother, Sister    Lupus Sister    No Known Problems Daughter    Sleep apnea Sister    Stroke Sister, Maternal Aunt        Tobacco Use    Smoking status: Never Smoker    Smokeless tobacco: Never Used   Substance and Sexual Activity    Alcohol use: Yes     Comment: occasional wine cooler     Drug use: Never    Sexual activity: Yes     Partners: Male     Birth control/protection: Post-menopausal     Review of Systems   Constitutional: Negative for chills and fever.   Respiratory: Negative for chest tightness and shortness of breath.    Cardiovascular: Negative for chest pain.   Gastrointestinal: Negative for abdominal pain.   Musculoskeletal: Negative for arthralgias.   Skin: Negative for color change.   Neurological: Negative for dizziness and headaches.   Psychiatric/Behavioral: Negative for agitation.     Objective:     Vital Signs (Most Recent):  Temp: 98.1 °F (36.7 °C) (12/06/19 0536)  Pulse: 70 (12/06/19 0536)  Resp: 19 (12/06/19 0536)  BP: (!) 197/84  (12/06/19 0536)  SpO2: 98 % (12/06/19 0536) Vital Signs (24h Range):  Temp:  [97.1 °F (36.2 °C)-98.3 °F (36.8 °C)] 98.1 °F (36.7 °C)  Pulse:  [64-81] 70  Resp:  [18-22] 19  SpO2:  [92 %-99 %] 98 %  BP: ()/(40-84) 197/84     Weight: 93.4 kg (205 lb 14.6 oz)  Body mass index is 36.48 kg/m².    Physical Exam   Constitutional: She is oriented to person, place, and time. She appears well-developed and well-nourished.   Cardiovascular: Normal rate.   Pulmonary/Chest: Effort normal.   Musculoskeletal:   RUE AVG without palpable thrill or pulsatility. 2+ R radial pulse   Neurological: She is alert and oriented to person, place, and time.   Nursing note and vitals reviewed.      Significant Labs:  CBC:   Recent Labs   Lab 12/05/19  0814   WBC 6.07   RBC 2.81*   HGB 9.1*   HCT 29.4*      *   MCH 32.4*   MCHC 31.0*     CMP:   Recent Labs   Lab 12/04/19  1253 12/05/19  0814 12/05/19  1442   * 84  --    CALCIUM 7.9* 7.9*  --    ALBUMIN 3.4* 3.4*  --    PROT 7.1  --   --     147*  --    K 5.8* 5.3* 5.1   CO2 25 26  --    CL 99 99  --    * 109*  --    CREATININE 12.3* 13.2*  --    ALKPHOS 97  --   --    ALT 7*  --   --    AST 7*  --   --    BILITOT 0.4  --   --        Significant Diagnostics:  I have reviewed all pertinent imaging results/findings within the past 24 hours.    Assessment/Plan:     ESRD (end stage renal disease)  60yo female with DM2, ESRD TTHS, CVA with residual L sided hemiparesis, seizures, and depression who presents to the ED for dialysis access    - recommend admission to medicine for dialysis  - Fistulogram today, possible declot, possible permcath placement  - rest of care per primary team/nephrology  - will continue to follow along, please call with questions        Corwin Villasenor MD  Vascular Surgery  [facility]

## 2019-12-06 NOTE — PROGRESS NOTES
[facility]  Nephrology  Progress Note    Patient Name: Lucy Perez  MRN: [id number]  Admission Date: 12/4/2019  Hospital Length of Stay: 1 days  Attending Provider: Angelo Veronica MD   Primary Care Physician: Beverly Muniz MD  Principal Problem:Hyperkalemia    Subjective:       Interval History:   Scheduled for AVG thrombectomy today, noted to be hyperkalemic this AM @ 5.9.  Medically treated with Kayexalate/insulin/dextrose/albuterol that was administered after repeat lab draw of 6.7 around noon.      Review of patient's allergies indicates:   Allergen Reactions    Lisinopril Other (See Comments)     Angioedema      Vicodin [hydrocodone-acetaminophen] Rash     No problem with acetaminophen      Current Facility-Administered Medications   Medication Frequency    acetaminophen tablet 650 mg Q6H PRN    albuterol inhaler 2 puff Q6H PRN    albuterol-ipratropium 2.5 mg-0.5 mg/3 mL nebulizer solution 3 mL Q4H PRN    aspirin EC tablet 81 mg QAM    atorvastatin tablet 40 mg Daily    bisacodyl suppository 10 mg Daily PRN    dextrose 10% (D10W) Bolus PRN    dextrose 10% (D10W) Bolus PRN    dextrose 10% (D10W) Bolus PRN    folic acid tablet 1 mg Daily    glucagon (human recombinant) injection 1 mg PRN    glucose chewable tablet 16 g PRN    glucose chewable tablet 24 g PRN    heparin (porcine) injection 5,000 Units Q8H    hydrALAZINE tablet 25 mg Q8H PRN    insulin aspart U-100 pen 2 Units TIDWM    levETIRAcetam tablet 1,000 mg Daily    lidocaine 5 % patch 1 patch Q24H    melatonin tablet 3 mg Nightly PRN    ondansetron disintegrating tablet 8 mg Q8H PRN    ondansetron injection 4 mg Q8H PRN    sevelamer carbonate tablet 1,600 mg TID WM    sodium chloride 0.9% flush 3 mL Q8H       Objective:     Vital Signs (Most Recent):  Temp: 98.3 °F (36.8 °C) (12/06/19 1555)  Pulse: 86 (12/06/19 1555)  Resp: 18 (12/06/19 1555)  BP: (!) 111/58 (12/06/19 1555)  SpO2: (!) 94 %  (12/06/19 1555)  O2 Device (Oxygen Therapy): room air (12/06/19 1127) Vital Signs (24h Range):  Temp:  [97.2 °F (36.2 °C)-98.3 °F (36.8 °C)] 98.3 °F (36.8 °C)  Pulse:  [64-86] 86  Resp:  [14-19] 18  SpO2:  [93 %-98 %] 94 %  BP: (111-197)/(56-84) 111/58     Weight: 93.4 kg (205 lb 14.6 oz) (12/06/19 0400)  Body mass index is 36.48 kg/m².  Body surface area is 2.04 meters squared.    I/O last 3 completed shifts:  In: 410 [P.O.:410]  Out: 0     Physical Exam   Constitutional: She is oriented to person, place, and time. She appears well-developed. No distress.   HENT:   Head: Normocephalic and atraumatic.   Right Ear: External ear normal.   Left Ear: External ear normal.   Eyes: Conjunctivae and EOM are normal. Right eye exhibits no discharge. Left eye exhibits no discharge. No scleral icterus.   Neck: Normal range of motion. Neck supple.   Cardiovascular: Normal rate and regular rhythm. Exam reveals no gallop and no friction rub.   No murmur heard.  Pulmonary/Chest: Effort normal and breath sounds normal. No respiratory distress. She has no wheezes. She has no rales.   Abdominal: Soft. Bowel sounds are normal. She exhibits no distension. There is no tenderness.   Musculoskeletal: She exhibits no edema.   Neurological: She is alert and oriented to person, place, and time.   Skin: Skin is warm and dry. She is not diaphoretic.   CAROLE AVG  Negative bruit/thrill   Psychiatric: Her mood appears anxious.       Significant Labs:  CBC:   Recent Labs   Lab 12/06/19  0848   WBC 5.75   RBC 2.54*   HGB 8.2*   HCT 25.7*      *   MCH 32.3*   MCHC 31.9*     CMP:   Recent Labs   Lab 12/04/19  1253  12/06/19  0848  12/06/19  1551   *   < > 172*  --   --    CALCIUM 7.9*   < > 7.6*  --   --    ALBUMIN 3.4*   < > 3.3*  --   --    PROT 7.1  --   --   --   --       < > 146*  --   --    K 5.8*   < > 5.9*   < > 4.2  4.2   CO2 25   < > 20*  --   --    CL 99   < > 101  --   --    *   < > 119*  --   --     CREATININE 12.3*   < > 14.1*  --   --    ALKPHOS 97  --   --   --   --    ALT 7*  --   --   --   --    AST 7*  --   --   --   --    BILITOT 0.4  --   --   --   --     < > = values in this interval not displayed.            Assessment/Plan:     ESRD (end stage renal disease)  ESRD on iHD     58 yo female presenting to the hospital for hemodialysis.  Was scheduled to start at a new dialysis unit on Monday, but her hepatitis labs were  and the new unit was unable to accept her.  Her last hemodialysis treatment was on  and she presented today for evaluation.  On admission she was found to have a clotted AVG.    Plan/Recommendations:  -hyperkalemic on repeat labs.  -hospital medicine discussed with Vasc Surg.  Medically treated around noon  -will repeat labs @ 1600  -if procedure cancelled, Patient will need temporary dialysis catheter placement and undergo hemodialysis tonight  -no overt signs of uremia on examination.  -will plan for additional HD treatment tomorrow    Will discuss with Staff, Dr. Ramsay.      Kevin Mcbride, ROHAN  Nephrology  [facility]

## 2019-12-06 NOTE — ASSESSMENT & PLAN NOTE
ESRD on iHD     60 yo female presenting to the hospital for hemodialysis.  Was scheduled to start at a new dialysis unit on Monday, but her hepatitis labs were  and the new unit was unable to accept her.  Her last hemodialysis treatment was on  and she presented today for evaluation.  On admission she was found to have a clotted AVG.    Plan/Recommendations:  -hyperkalemic on repeat labs.  -hospital medicine discussed with Vasc Surg.  Medically treated around noon  -will repeat labs @ 1600  -if procedure cancelled, Patient will need temporary dialysis catheter placement and undergo hemodialysis tonight  -no overt signs of uremia on examination.  -will plan for additional HD treatment tomorrow    Will discuss with Staff, Dr. Ramsay.

## 2019-12-06 NOTE — ASSESSMENT & PLAN NOTE
60yo female with DM2, ESRD TTHS, CVA with residual L sided hemiparesis, seizures, and depression who presents to the ED for dialysis access    - recommend admission to medicine for dialysis  - Fistulogram today, possible declot, possible permcath placement  - rest of care per primary team/nephrology  - will continue to follow along, please call with questions

## 2019-12-06 NOTE — SUBJECTIVE & OBJECTIVE
Interval History: No reported events overnight. Pt resting in NAD with  at bedside. No new complaints. K 5.9 this AM, shifted. Fistulogram today with vasc surg    Review of Systems   Constitutional: Negative for fatigue and fever.   Respiratory: Negative for cough and shortness of breath.    Cardiovascular: Negative for chest pain, palpitations and leg swelling.   Gastrointestinal: Negative for abdominal pain, diarrhea, nausea and vomiting.   Genitourinary: Positive for decreased urine volume (ESRD). Negative for hematuria.   Musculoskeletal: Positive for back pain (chronic) and gait problem (wheelchair bound since CVA).   Skin: Negative for rash and wound.   Neurological: Positive for weakness (chronic L sided weakness s/p CVA). Negative for syncope and headaches.   Psychiatric/Behavioral: Negative for agitation. The patient is not nervous/anxious.      Objective:     Vital Signs (Most Recent):  Temp: 97.8 °F (36.6 °C) (12/06/19 0803)  Pulse: 72 (12/06/19 0830)  Resp: 18 (12/06/19 0803)  BP: 139/65 (12/06/19 0803)  SpO2: (!) 93 % (12/06/19 0803) Vital Signs (24h Range):  Temp:  [97.1 °F (36.2 °C)-98.3 °F (36.8 °C)] 97.8 °F (36.6 °C)  Pulse:  [64-81] 72  Resp:  [18-19] 18  SpO2:  [92 %-98 %] 93 %  BP: ()/(40-84) 139/65     Weight: 93.4 kg (205 lb 14.6 oz)  Body mass index is 36.48 kg/m².  No intake or output data in the 24 hours ending 12/06/19 1105   Physical Exam   Constitutional: She is oriented to person, place, and time. She appears well-developed and well-nourished. No distress.   HENT:   Head: Normocephalic and atraumatic.   Eyes: Right eye exhibits no discharge. Left eye exhibits no discharge.   Neck: Normal range of motion. Neck supple.   Cardiovascular: Normal rate, regular rhythm and normal heart sounds.   Pulmonary/Chest: Effort normal. No respiratory distress.   Abdominal: Soft. Bowel sounds are normal. She exhibits no distension. There is no tenderness.   Musculoskeletal: Normal range of  motion. She exhibits no edema or tenderness.   Neurological: She is alert and oriented to person, place, and time.   Chronic L sided hemiparesis   Skin: Skin is warm and dry. She is not diaphoretic.   RUE AVG, no palpable thrill   Psychiatric: She has a normal mood and affect.       Significant Labs: All pertinent labs within the past 24 hours have been reviewed.    Significant Imaging: I have reviewed all pertinent imaging results/findings within the past 24 hours.

## 2019-12-06 NOTE — SUBJECTIVE & OBJECTIVE
Interval History:   Scheduled for AVG thrombectomy today, noted to be hyperkalemic this AM @ 5.9.  Medically treated with Kayexalate/insulin/dextrose/albuterol that was administered after repeat lab draw of 6.7 around noon.      Review of patient's allergies indicates:   Allergen Reactions    Lisinopril Other (See Comments)     Angioedema      Vicodin [hydrocodone-acetaminophen] Rash     No problem with acetaminophen      Current Facility-Administered Medications   Medication Frequency    acetaminophen tablet 650 mg Q6H PRN    albuterol inhaler 2 puff Q6H PRN    albuterol-ipratropium 2.5 mg-0.5 mg/3 mL nebulizer solution 3 mL Q4H PRN    aspirin EC tablet 81 mg QAM    atorvastatin tablet 40 mg Daily    bisacodyl suppository 10 mg Daily PRN    dextrose 10% (D10W) Bolus PRN    dextrose 10% (D10W) Bolus PRN    dextrose 10% (D10W) Bolus PRN    folic acid tablet 1 mg Daily    glucagon (human recombinant) injection 1 mg PRN    glucose chewable tablet 16 g PRN    glucose chewable tablet 24 g PRN    heparin (porcine) injection 5,000 Units Q8H    hydrALAZINE tablet 25 mg Q8H PRN    insulin aspart U-100 pen 2 Units TIDWM    levETIRAcetam tablet 1,000 mg Daily    lidocaine 5 % patch 1 patch Q24H    melatonin tablet 3 mg Nightly PRN    ondansetron disintegrating tablet 8 mg Q8H PRN    ondansetron injection 4 mg Q8H PRN    sevelamer carbonate tablet 1,600 mg TID WM    sodium chloride 0.9% flush 3 mL Q8H       Objective:     Vital Signs (Most Recent):  Temp: 98.3 °F (36.8 °C) (12/06/19 1555)  Pulse: 86 (12/06/19 1555)  Resp: 18 (12/06/19 1555)  BP: (!) 111/58 (12/06/19 1555)  SpO2: (!) 94 % (12/06/19 1555)  O2 Device (Oxygen Therapy): room air (12/06/19 1127) Vital Signs (24h Range):  Temp:  [97.2 °F (36.2 °C)-98.3 °F (36.8 °C)] 98.3 °F (36.8 °C)  Pulse:  [64-86] 86  Resp:  [14-19] 18  SpO2:  [93 %-98 %] 94 %  BP: (111-197)/(56-84) 111/58     Weight: 93.4 kg (205 lb 14.6 oz) (12/06/19 0400)  Body mass  index is 36.48 kg/m².  Body surface area is 2.04 meters squared.    I/O last 3 completed shifts:  In: 410 [P.O.:410]  Out: 0     Physical Exam   Constitutional: She is oriented to person, place, and time. She appears well-developed. No distress.   HENT:   Head: Normocephalic and atraumatic.   Right Ear: External ear normal.   Left Ear: External ear normal.   Eyes: Conjunctivae and EOM are normal. Right eye exhibits no discharge. Left eye exhibits no discharge. No scleral icterus.   Neck: Normal range of motion. Neck supple.   Cardiovascular: Normal rate and regular rhythm. Exam reveals no gallop and no friction rub.   No murmur heard.  Pulmonary/Chest: Effort normal and breath sounds normal. No respiratory distress. She has no wheezes. She has no rales.   Abdominal: Soft. Bowel sounds are normal. She exhibits no distension. There is no tenderness.   Musculoskeletal: She exhibits no edema.   Neurological: She is alert and oriented to person, place, and time.   Skin: Skin is warm and dry. She is not diaphoretic.   CAROLE AVG  Negative bruit/thrill   Psychiatric: Her mood appears anxious.       Significant Labs:  CBC:   Recent Labs   Lab 12/06/19  0848   WBC 5.75   RBC 2.54*   HGB 8.2*   HCT 25.7*      *   MCH 32.3*   MCHC 31.9*     CMP:   Recent Labs   Lab 12/04/19  1253  12/06/19  0848  12/06/19  1551   *   < > 172*  --   --    CALCIUM 7.9*   < > 7.6*  --   --    ALBUMIN 3.4*   < > 3.3*  --   --    PROT 7.1  --   --   --   --       < > 146*  --   --    K 5.8*   < > 5.9*   < > 4.2  4.2   CO2 25   < > 20*  --   --    CL 99   < > 101  --   --    *   < > 119*  --   --    CREATININE 12.3*   < > 14.1*  --   --    ALKPHOS 97  --   --   --   --    ALT 7*  --   --   --   --    AST 7*  --   --   --   --    BILITOT 0.4  --   --   --   --     < > = values in this interval not displayed.

## 2019-12-06 NOTE — SUBJECTIVE & OBJECTIVE
"Medications Prior to Admission   Medication Sig Dispense Refill Last Dose    acetaminophen (TYLENOL) 325 MG tablet Take 2 tablets (650 mg total) by mouth every 6 (six) hours as needed for Pain.  0 12/3/2019    albuterol (VENTOLIN HFA) 90 mcg/actuation inhaler Inhale 2 puffs into the lungs every 6 (six) hours as needed for Wheezing. Rescue 18 g 0 12/3/2019    aspirin (ECOTRIN) 81 MG EC tablet Take 81 mg by mouth every morning.    12/3/2019    atorvastatin (LIPITOR) 40 MG tablet TAKE 1 TABLET ONE TIME DAILY FOR CHOLESTEROL 90 tablet 2 12/3/2019    BD INSULIN PEN NEEDLE UF SHORT 31 gauge x 5/16" Ndle USE TO INJECT NOVOLOG FLEXPEN BEFORE MEALS 150 each 11 12/3/2019    bisacodyl (DULCOLAX) 10 mg Supp Place 1 suppository (10 mg total) rectally daily as needed. 30 suppository 3 12/3/2019    blood sugar diagnostic Strp To check BG 4  times daily, to use with insurance preferred meter-true metrix 400 each 3 12/3/2019    blood-glucose meter kit To check BG 4 times daily, to use with insurance preferred meter-true metrix 1 each 1 12/3/2019    citalopram (CELEXA) 20 MG tablet Take 1 tablet (20 mg total) by mouth once daily. 1 tab daily for depression 30 tablet 4 12/3/2019    ergocalciferol (ERGOCALCIFEROL) 50,000 unit Cap Take 1 capsule (50,000 Units total) by mouth every 7 days.   12/3/2019    famotidine (PEPCID) 20 MG tablet Take 1 tablet (20 mg total) by mouth once daily. 90 tablet 2 12/3/2019    FLUoxetine 10 MG capsule Take 10 mg by mouth once daily.  4 12/3/2019    FLUoxetine 10 MG Tab Take 1 tablet (10 mg total) by mouth once daily. 30 tablet 4 12/3/2019    glipiZIDE (GLUCOTROL) 2.5 MG TR24 Take 1 tablet (hold if less than 150) at lunch (non dialysis days) 30 tablet 4 12/3/2019    insulin aspart U-100 (NOVOLOG U-100 INSULIN ASPART) 100 unit/mL injection Use correction scale 180-230+1, 231-280+2, 281-330+3, 331-380+4, >380+5, max 15 units. 3 vial 3 12/3/2019    insulin glargine (LANTUS U-100 INSULIN) 100 " unit/mL injection Inject 10-12 Units into the skin every evening. 6 mL 6 12/3/2019    lancets Misc 1 lancet by Misc.(Non-Drug; Combo Route) route 2 (two) times daily with meals. Check glucose before meals and bed 300 each 4 12/3/2019    levETIRAcetam (KEPPRA) 1000 MG tablet Take 1 tablet (1,000 mg total) by mouth once daily. 90 tablet 3 12/3/2019    levETIRAcetam (KEPPRA) 500 MG Tab Take 1 tablet (500 mg total) by mouth every Mon, Wed, Fri. Monday Wednesday and Friday after DIALYSIS take additional 500mg 36 tablet 3 12/3/2019    ondansetron (ZOFRAN-ODT) 4 MG TbDL Take 1 tablet (4 mg total) by mouth every 8 (eight) hours as needed. 10 tablet 0 12/3/2019    polyethylene glycol (MIRALAX) 17 gram/dose powder Take 17 g by mouth 2 (two) times daily. 1 Bottle 6 12/3/2019    pregabalin (LYRICA) 25 MG capsule Take 1 capsule (25 mg total) by mouth once daily. May take additional dose after HD. (Patient taking differently: Take 25 mg by mouth daily as needed. May take additional dose after HD.) 90 capsule 4 12/3/2019    sevelamer carbonate (RENVELA) 800 mg Tab Take 800 mg by mouth 3 (three) times daily with meals.   12/3/2019    folic acid (FOLVITE) 1 MG tablet Take 1 tablet (1 mg total) by mouth once daily. 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    Cortical cataract of both eyes 7/26/2016    Decubitus ulcer of buttock, stage 2     Degeneration of lumbar or  lumbosacral intervertebral disc 3/5/2013    S/p MRI L-spine 5/2009     Depression     Encounter for blood transfusion     ESRD on hemodialysis     Started August 2018    General anesthetics causing adverse effect in therapeutic use     Hemorrhagic cerebrovascular accident (CVA)     8/2016 s/p Hemicraniotomy at [facility] with Left hemiparesis    History of stroke 6/28/2017    s/p R-MCA stroke with R-putaminal hemorrhagic transformation in 8/2016 and 11/2016 (s/p hemicraniotomy at [facility]) with residual L hemiparesis, on AED s/p CVA      Hypertensive retinopathy of both eyes 7/26/2016    Impingement syndrome of right shoulder 7/2/2014    Obesity     THERESA (obstructive sleep apnea) 3/5/2013    No Home CPAP 2ndary to cost     Partial symptomatic epilepsy with complex partial seizures, not intractable, without status epilepticus     Rheumatoid arthritis(714.0)     Rotator cuff tear 7/2/2014    Sarcoidosis     Stroke 2016    left sided flaccidity, SAH    Vertebral artery stenosis 3/5/2013    S/p Stenting per Dr Burnett      Past Surgical History:   Procedure Laterality Date    BREAST SURGERY      breast reduction    COLONOSCOPY N/A 8/11/2016    Procedure: COLONOSCOPY;  Surgeon: Jerry Vilchis MD;  Location: [facility] ENDO ([facility] FLR);  Service: Endoscopy;  Laterality: N/A;  Patient reports difficulty awaking from anesthesia in the past.    DECLOTTING OF VASCULAR GRAFT Right 6/20/2019    Procedure: DECLOT-GRAFT;  Surgeon: Cabrera Irwin MD;  Location: [facility] CATH LAB;  Service: Cardiology;  Laterality: Right;    FISTULOGRAM Right 2/11/2019    Procedure: Fistulogram;  Surgeon: Meir Valencia MD;  Location: [facility] CATH LAB;  Service: Peripheral Vascular;  Laterality: Right;    FISTULOGRAM Right 7/8/2019    Procedure: Fistulogram;  Surgeon: WELLINGTON Palm III, MD;  Location: [facility] CATH LAB;  Service: Peripheral Vascular;  Laterality: Right;    HYSTERECTOMY  1999    JOSE LUIS/BSO (AUB)    PLACEMENT OF ARTERIOVENOUS GRAFT  Right 10/18/2018    Procedure: AV GRAFT CREATION;  Surgeon: Meir Valencia MD;  Location: [facility] OR [address] [place] [address];  Service: Cardiovascular;  Laterality: Right;    ROTATOR CUFF REPAIR Right July 9, 2014    right side    Skull surgery      Aneurysm    stent placed      in vertebral artery    TOTAL REDUCTION MAMMOPLASTY      TUBAL LIGATION       Family History     Problem Relation (Age of Onset)    Bell's palsy Sister    Blindness Maternal Grandmother    Breast cancer Mother    Diabetes Mother, Son,     Heart attack Mother    Heart disease Mother    Hypertension Mother, Sister    Lupus Sister    No Known Problems Daughter    Sleep apnea Sister    Stroke Sister, Maternal Aunt        Tobacco Use    Smoking status: Never Smoker    Smokeless tobacco: Never Used   Substance and Sexual Activity    Alcohol use: Yes     Comment: occasional wine cooler     Drug use: Never    Sexual activity: Yes     Partners: Male     Birth control/protection: Post-menopausal     Review of Systems   Constitutional: Negative for chills and fever.   Respiratory: Negative for chest tightness and shortness of breath.    Cardiovascular: Negative for chest pain.   Gastrointestinal: Negative for abdominal pain.   Musculoskeletal: Negative for arthralgias.   Skin: Negative for color change.   Neurological: Negative for dizziness and headaches.   Psychiatric/Behavioral: Negative for agitation.     Objective:     Vital Signs (Most Recent):  Temp: 98.1 °F (36.7 °C) (12/06/19 0536)  Pulse: 70 (12/06/19 0536)  Resp: 19 (12/06/19 0536)  BP: (!) 197/84 (12/06/19 0536)  SpO2: 98 % (12/06/19 0536) Vital Signs (24h Range):  Temp:  [97.1 °F (36.2 °C)-98.3 °F (36.8 °C)] 98.1 °F (36.7 °C)  Pulse:  [64-81] 70  Resp:  [18-22] 19  SpO2:  [92 %-99 %] 98 %  BP: ()/(40-84) 197/84     Weight: 93.4 kg (205 lb 14.6 oz)  Body mass index is 36.48 kg/m².    Physical Exam   Constitutional: She is oriented to person, place, and time. She appears well-developed and  well-nourished.   Cardiovascular: Normal rate.   Pulmonary/Chest: Effort normal.   Musculoskeletal:   RUE AVG without palpable thrill or pulsatility. 2+ R radial pulse   Neurological: She is alert and oriented to person, place, and time.   Nursing note and vitals reviewed.      Significant Labs:  CBC:   Recent Labs   Lab 12/05/19  0814   WBC 6.07   RBC 2.81*   HGB 9.1*   HCT 29.4*      *   MCH 32.4*   MCHC 31.0*     CMP:   Recent Labs   Lab 12/04/19  1253 12/05/19  0814 12/05/19  1442   * 84  --    CALCIUM 7.9* 7.9*  --    ALBUMIN 3.4* 3.4*  --    PROT 7.1  --   --     147*  --    K 5.8* 5.3* 5.1   CO2 25 26  --    CL 99 99  --    * 109*  --    CREATININE 12.3* 13.2*  --    ALKPHOS 97  --   --    ALT 7*  --   --    AST 7*  --   --    BILITOT 0.4  --   --        Significant Diagnostics:  I have reviewed all pertinent imaging results/findings within the past 24 hours.

## 2019-12-06 NOTE — PROGRESS NOTES
Called MD Giles about PT potassium as well as morning medications. Md said to give morning medications. Md said to give oral Liquid suspension.

## 2019-12-06 NOTE — PROGRESS NOTES
Gave report to KARINA Wing about pt. Lab has been called. They are coming up to the floor now to get pt labs before she goes downstairs.Maciej said she is setting up transport now. Pt very anxious and nervous about the procedure. Pt AAOx3 with delayed responses with  at bedside.

## 2019-12-06 NOTE — PLAN OF CARE
Problem: Fall Injury Risk  Goal: Absence of Fall and Fall-Related Injury  Outcome: Ongoing, Progressing     Problem: Adult Inpatient Plan of Care  Goal: Plan of Care Review  Outcome: Ongoing, Progressing  Goal: Patient-Specific Goal (Individualization)  Outcome: Ongoing, Progressing  Goal: Absence of Hospital-Acquired Illness or Injury  Outcome: Ongoing, Progressing  Goal: Optimal Comfort and Wellbeing  Outcome: Ongoing, Progressing  Goal: Readiness for Transition of Care  Outcome: Ongoing, Progressing  Goal: Rounds/Family Conference  Outcome: Ongoing, Progressing     Problem: Infection  Goal: Infection Symptom Resolution  Outcome: Ongoing, Progressing     Problem: Diabetes Comorbidity  Goal: Blood Glucose Level Within Desired Range  Outcome: Ongoing, Progressing     Problem: Skin Injury Risk Increased  Goal: Skin Health and Integrity  Outcome: Ongoing, Progressing   Patient rested this shift in bed without any complaints. Family at bedside. NPO after midnight for procedure this AM. Will monitor. Srx2, bed low, CB near.

## 2019-12-06 NOTE — ANESTHESIA PREPROCEDURE EVALUATION
12/06/2019  Lucy Perez is a 59 y.o., female.  Patient Active Problem List   Diagnosis    Hyperlipidemia    Mixed anxiety and depressive disorder    THERESA (obstructive sleep apnea)    Sarcoidosis    Acute on chronic diastolic (congestive) heart failure    Pulmonary hypertension    Left hemiparesis    Central pain syndrome    Partial symptomatic epilepsy with complex partial seizures, not intractable, without status epilepticus    Slow transit constipation    Anemia in ESRD (end-stage renal disease)    Essential hypertension    Folic acid deficiency    Screening for colorectal cancer    LEFT Hemiplegia as late effect of stroke    Vitamin D deficiency    Hypertensive urgency    Type 2 diabetes mellitus with chronic kidney disease on chronic dialysis, with long-term current use of insulin    Fatigue    ESRD (end stage renal disease)    Gall stones    Rheumatoid arthritis involving multiple sites    Coronary angioplasty status    Positive depression screening    Atherosclerosis of aorta    Thrombosis of arteriovenous graft    Swelling of right upper extremity    Hematuria    Cholelithiasis    Problem with dialysis access    Hernia of abdominal wall    History of stroke    Hyperkalemia    Problem with vascular access         Anesthesia Evaluation         Review of Systems      Physical Exam      Chest/Lungs:  Chest/Lungs Findings: Clear to auscultation     Heart/Vascular:  Heart Findings: Rate: Normal  Rhythm: Regular Rhythm  Sounds: Normal             Anesthesia Plan  Type of Anesthesia, risks & benefits discussed:  Anesthesia Type:  MAC  Patient's Preference: Sedation  Intra-op Monitoring Plan: standard ASA monitors  Intra-op Monitoring Plan Comments:   Post Op Pain Control Plan: per primary service following discharge from PACU  Post Op Pain Control Plan Comments:    Induction:   IV  Beta Blocker:  Patient is not currently on a Beta-Blocker (No further documentation required).       Informed Consent: Patient understands risks and agrees with Anesthesia plan.  Questions answered.   ASA Score: 4     Day of Surgery Review of History & Physical:    H&P update referred to the surgeon.     Anesthesia Plan Notes: Sedation plan discussed.          Ready For Surgery From Anesthesia Perspective.

## 2019-12-06 NOTE — PROGRESS NOTES
Pt AAOx4. Pt free of falls/trauma/injuries. Vital signs are in stable condition. Bed in low position, wheels locked, and call light within reach. Skin integrity remains unchanged. Pt  request that he help his wife. Pt turned Q2 hours with husbands help. Pt had a BM in diaper and  asked to take care of her. Pt anuric.  put pt into her wheel chair and put her back into bed. No distress noted/reported at this time. Pt and  aware of NPO at midnight. WCTM.

## 2019-12-06 NOTE — ASSESSMENT & PLAN NOTE
- Last A1c 6.8 in 8/2019; repeat 7.8 today  -  on admit; patient endorses not taking home insulin  - 13U detemir qdaily, aspart 4U TID WM, low dose SSI  - Renal/DM diet  - Monitor BGs and adjust insulin PRN  12/5-12/6: BG at goal, monitor for hypoglycemia

## 2019-12-06 NOTE — PROGRESS NOTES
[facility] Hospital Medicine  Progress Note    Patient Name: Lucy Perez  MRN: [id number]  Patient Class: IP- Inpatient   Admission Date: 12/4/2019  Length of Stay: 1 days  Attending Physician: Angelo Veronica MD  Primary Care Provider: Beverly Muniz MD    [facility] Medicine Team: [facility] HOSP MED E Tisha Aguilera PA-C    Subjective:     Principal Problem:Hyperkalemia        HPI:  59 year old female with a PMHx of DM2, ESRD TTHS, CVA with residual L sided hemiparesis, seizures, and depression presenting to the ER with  at bedside for dialysis. Patient's last HD session was Saturday 11/30. She has been transitioning from [facility] to [facility] for outpatient HD. Unfortunately, [facility] has not received all the paperwork needed for her to transition dialysis there. She presents to the ER today to have dialysis. At the time of my exam, patient is resting comfortably in bed with  at bedside. She has no complaints. Denies CP, SOB, abdominal pain, N/V/D, fever/chills, dysuria, palpitations, syncope, headache, paresthesias, and new weakness.      HDS on admission, afebrile without leukocytosis. Labs remarkable for chronic anemia, K 5.8, , and phos 7.7.     Overview/Hospital Course:  Patient admitted for hyperkalemia in setting of ESRD on HD. Unfortunately patient is in the process of switching HD centers and she was unable to be dialyzed outpt. K 5.8 on admission, improved to 5.3 with kayexalate/calcium gluconate. Patient's AVG found to be clotted, vascular surgery consulted. Appreciate assistance, plan for declot 12/6. Nephrology following along, currently no urgent need for RRT, but if unable to be declotted in a timely manner, she will need a temporary dialysis catheter.     Interval History: No reported events overnight. Pt resting in NAD with  at bedside. No new complaints. K 5.9 this AM, shifted. Fistulogram today with vasc surg    Review of Systems    Constitutional: Negative for fatigue and fever.   Respiratory: Negative for cough and shortness of breath.    Cardiovascular: Negative for chest pain, palpitations and leg swelling.   Gastrointestinal: Negative for abdominal pain, diarrhea, nausea and vomiting.   Genitourinary: Positive for decreased urine volume (ESRD). Negative for hematuria.   Musculoskeletal: Positive for back pain (chronic) and gait problem (wheelchair bound since CVA).   Skin: Negative for rash and wound.   Neurological: Positive for weakness (chronic L sided weakness s/p CVA). Negative for syncope and headaches.   Psychiatric/Behavioral: Negative for agitation. The patient is not nervous/anxious.      Objective:     Vital Signs (Most Recent):  Temp: 97.8 °F (36.6 °C) (12/06/19 0803)  Pulse: 72 (12/06/19 0830)  Resp: 18 (12/06/19 0803)  BP: 139/65 (12/06/19 0803)  SpO2: (!) 93 % (12/06/19 0803) Vital Signs (24h Range):  Temp:  [97.1 °F (36.2 °C)-98.3 °F (36.8 °C)] 97.8 °F (36.6 °C)  Pulse:  [64-81] 72  Resp:  [18-19] 18  SpO2:  [92 %-98 %] 93 %  BP: ()/(40-84) 139/65     Weight: 93.4 kg (205 lb 14.6 oz)  Body mass index is 36.48 kg/m².  No intake or output data in the 24 hours ending 12/06/19 1105   Physical Exam   Constitutional: She is oriented to person, place, and time. She appears well-developed and well-nourished. No distress.   HENT:   Head: Normocephalic and atraumatic.   Eyes: Right eye exhibits no discharge. Left eye exhibits no discharge.   Neck: Normal range of motion. Neck supple.   Cardiovascular: Normal rate, regular rhythm and normal heart sounds.   Pulmonary/Chest: Effort normal. No respiratory distress.   Abdominal: Soft. Bowel sounds are normal. She exhibits no distension. There is no tenderness.   Musculoskeletal: Normal range of motion. She exhibits no edema or tenderness.   Neurological: She is alert and oriented to person, place, and time.   Chronic L sided hemiparesis   Skin: Skin is warm and dry. She is not  diaphoretic.   RUE AVG, no palpable thrill   Psychiatric: She has a normal mood and affect.       Significant Labs: All pertinent labs within the past 24 hours have been reviewed.    Significant Imaging: I have reviewed all pertinent imaging results/findings within the past 24 hours.      Assessment/Plan:      * Hyperkalemia  ESRD TTHS (last HD 11/30)  Anemia in ESRD  Acute on chronic HFpEF   Problem with vascular access  - HDS on admission, stable on RA  - Labs remarkable for mild hyperkalemia 5.8, phos 7.7  - Nephrology consulted for maintenance HD  - AVG to RUE without bruit or thrill; vascular surg consulted, appreciate assistance  - Will give kayexalate x1 > repeat K 5.3  -  increase renvela in the mean time from 800mg TIDWM to 1600TIDWM (12/5)  12/6: K+ 5.9; shifted; repeat K+ this PM  - fistulogram today per vasc surg  - Renal/DM diet when eating  - Strict I/Os, daily weights  *CM/SW to fax additional paperwork to [facility]; completed, pt is set up for HD outpt at [facility]     Type 2 diabetes mellitus with chronic kidney disease on chronic dialysis, with long-term current use of insulin  - Last A1c 6.8 in 8/2019; repeat 7.8 today  -  on admit; patient endorses not taking home insulin  - 13U detemir qdaily, aspart 4U TID WM, low dose SSI  - Renal/DM diet  - Monitor BGs and adjust insulin PRN  12/5-12/6: BG at goal, monitor for hypoglycemia    History of stroke  L sided hemiparesis  HLD  Wheelchair bound  - Continue ASA and lipitor    Partial symptomatic epilepsy with complex partial seizures, not intractable, without status epilepticus  - No recent seizure activity  - Patient reports taking keppra 500 mg BID on HD days and 100 mg daily on other days  - Will continue    Mixed anxiety and depressive disorder  - Chronic and stable  - Patient and  are unsure if taking celexa or prozac at home; daughter to confirm and notify      VTE Risk Mitigation (From admission, onward)         Ordered     heparin  (porcine) injection 5,000 Units  Every 8 hours      12/04/19 1501     Place sequential compression device  Until discontinued      12/04/19 1501     Place LUIS hose  Until discontinued      12/04/19 1501     IP VTE HIGH RISK PATIENT  Once      12/04/19 1501                      Tisha Aguilera PA-C  Department of Hospital Medicine   [facility]

## 2019-12-07 LAB
ABO + RH BLD: NORMAL
ALBUMIN SERPL BCP-MCNC: 3.3 G/DL (ref 3.5–5.2)
ANION GAP SERPL CALC-SCNC: 17 MMOL/L (ref 8–16)
BASOPHILS # BLD AUTO: 0.04 K/UL (ref 0–0.2)
BASOPHILS # BLD AUTO: 0.06 K/UL (ref 0–0.2)
BASOPHILS NFR BLD: 0.5 % (ref 0–1.9)
BASOPHILS NFR BLD: 0.7 % (ref 0–1.9)
BLD GP AB SCN CELLS X3 SERPL QL: NORMAL
BUN SERPL-MCNC: 58 MG/DL (ref 8–23)
CALCIUM SERPL-MCNC: 8.8 MG/DL (ref 8.7–10.5)
CHLORIDE SERPL-SCNC: 102 MMOL/L (ref 95–110)
CO2 SERPL-SCNC: 20 MMOL/L (ref 23–29)
CREAT SERPL-MCNC: 8.4 MG/DL (ref 0.5–1.4)
DIFFERENTIAL METHOD: ABNORMAL
DIFFERENTIAL METHOD: ABNORMAL
EOSINOPHIL # BLD AUTO: 0.1 K/UL (ref 0–0.5)
EOSINOPHIL # BLD AUTO: 0.1 K/UL (ref 0–0.5)
EOSINOPHIL NFR BLD: 0.8 % (ref 0–8)
EOSINOPHIL NFR BLD: 1.6 % (ref 0–8)
ERYTHROCYTE [DISTWIDTH] IN BLOOD BY AUTOMATED COUNT: 12.8 % (ref 11.5–14.5)
ERYTHROCYTE [DISTWIDTH] IN BLOOD BY AUTOMATED COUNT: 13 % (ref 11.5–14.5)
EST. GFR  (AFRICAN AMERICAN): 5.4 ML/MIN/1.73 M^2
EST. GFR  (NON AFRICAN AMERICAN): 4.7 ML/MIN/1.73 M^2
FERRITIN SERPL-MCNC: 1922 NG/ML (ref 20–300)
GLUCOSE SERPL-MCNC: 180 MG/DL (ref 70–110)
HCT VFR BLD AUTO: 25 % (ref 37–48.5)
HCT VFR BLD AUTO: 25.6 % (ref 37–48.5)
HGB BLD-MCNC: 7.6 G/DL (ref 12–16)
HGB BLD-MCNC: 7.6 G/DL (ref 12–16)
IMM GRANULOCYTES # BLD AUTO: 0.02 K/UL (ref 0–0.04)
IMM GRANULOCYTES # BLD AUTO: 0.03 K/UL (ref 0–0.04)
IMM GRANULOCYTES NFR BLD AUTO: 0.3 % (ref 0–0.5)
IMM GRANULOCYTES NFR BLD AUTO: 0.4 % (ref 0–0.5)
IRON SERPL-MCNC: 142 UG/DL (ref 30–160)
LYMPHOCYTES # BLD AUTO: 0.7 K/UL (ref 1–4.8)
LYMPHOCYTES # BLD AUTO: 0.8 K/UL (ref 1–4.8)
LYMPHOCYTES NFR BLD: 9 % (ref 18–48)
LYMPHOCYTES NFR BLD: 9.7 % (ref 18–48)
MCH RBC QN AUTO: 31.8 PG (ref 27–31)
MCH RBC QN AUTO: 32.8 PG (ref 27–31)
MCHC RBC AUTO-ENTMCNC: 29.7 G/DL (ref 32–36)
MCHC RBC AUTO-ENTMCNC: 30.4 G/DL (ref 32–36)
MCV RBC AUTO: 107 FL (ref 82–98)
MCV RBC AUTO: 108 FL (ref 82–98)
MONOCYTES # BLD AUTO: 0.7 K/UL (ref 0.3–1)
MONOCYTES # BLD AUTO: 0.8 K/UL (ref 0.3–1)
MONOCYTES NFR BLD: 8.6 % (ref 4–15)
MONOCYTES NFR BLD: 9.5 % (ref 4–15)
NEUTROPHILS # BLD AUTO: 6.4 K/UL (ref 1.8–7.7)
NEUTROPHILS # BLD AUTO: 6.5 K/UL (ref 1.8–7.7)
NEUTROPHILS NFR BLD: 78.1 % (ref 38–73)
NEUTROPHILS NFR BLD: 80.8 % (ref 38–73)
NRBC BLD-RTO: 0 /100 WBC
NRBC BLD-RTO: 0 /100 WBC
PHOSPHATE SERPL-MCNC: 6.5 MG/DL (ref 2.7–4.5)
PLATELET # BLD AUTO: 186 K/UL (ref 150–350)
PLATELET # BLD AUTO: 197 K/UL (ref 150–350)
PMV BLD AUTO: 10.4 FL (ref 9.2–12.9)
PMV BLD AUTO: 10.5 FL (ref 9.2–12.9)
POCT GLUCOSE: 173 MG/DL (ref 70–110)
POCT GLUCOSE: 238 MG/DL (ref 70–110)
POCT GLUCOSE: 458 MG/DL (ref 70–110)
POCT GLUCOSE: 96 MG/DL (ref 70–110)
POTASSIUM SERPL-SCNC: 3.7 MMOL/L (ref 3.5–5.1)
POTASSIUM SERPL-SCNC: 3.7 MMOL/L (ref 3.5–5.1)
POTASSIUM SERPL-SCNC: 3.8 MMOL/L (ref 3.5–5.1)
POTASSIUM SERPL-SCNC: 3.8 MMOL/L (ref 3.5–5.1)
POTASSIUM SERPL-SCNC: 3.9 MMOL/L (ref 3.5–5.1)
POTASSIUM SERPL-SCNC: 3.9 MMOL/L (ref 3.5–5.1)
POTASSIUM SERPL-SCNC: 4.2 MMOL/L (ref 3.5–5.1)
POTASSIUM SERPL-SCNC: 4.2 MMOL/L (ref 3.5–5.1)
POTASSIUM SERPL-SCNC: 5.3 MMOL/L (ref 3.5–5.1)
RBC # BLD AUTO: 2.32 M/UL (ref 4–5.4)
RBC # BLD AUTO: 2.39 M/UL (ref 4–5.4)
SATURATED IRON: 79 % (ref 20–50)
SODIUM SERPL-SCNC: 139 MMOL/L (ref 136–145)
TOTAL IRON BINDING CAPACITY: 179 UG/DL (ref 250–450)
TRANSFERRIN SERPL-MCNC: 121 MG/DL (ref 200–375)
WBC # BLD AUTO: 7.93 K/UL (ref 3.9–12.7)
WBC # BLD AUTO: 8.35 K/UL (ref 3.9–12.7)

## 2019-12-07 PROCEDURE — 11000001 HC ACUTE MED/SURG PRIVATE ROOM: Mod: HCNC,NTX

## 2019-12-07 PROCEDURE — 36415 COLL VENOUS BLD VENIPUNCTURE: CPT | Mod: HCNC,NTX

## 2019-12-07 PROCEDURE — 99233 PR SUBSEQUENT HOSPITAL CARE,LEVL III: ICD-10-PCS | Mod: HCNC,NTX,, | Performed by: PHYSICIAN ASSISTANT

## 2019-12-07 PROCEDURE — 25000003 PHARM REV CODE 250: Mod: HCNC,NTX | Performed by: PHYSICIAN ASSISTANT

## 2019-12-07 PROCEDURE — 63600175 PHARM REV CODE 636 W HCPCS: Mod: HCNC,NTX | Performed by: PHYSICIAN ASSISTANT

## 2019-12-07 PROCEDURE — 99233 SBSQ HOSP IP/OBS HIGH 50: CPT | Mod: HCNC,NTX,, | Performed by: PHYSICIAN ASSISTANT

## 2019-12-07 PROCEDURE — 80100016 HC MAINTENANCE HEMODIALYSIS: Mod: HCNC,NTX

## 2019-12-07 PROCEDURE — 90935 PR HEMODIALYSIS, ONE EVALUATION: ICD-10-PCS | Mod: HCNC,NTX,, | Performed by: NURSE PRACTITIONER

## 2019-12-07 PROCEDURE — 84132 ASSAY OF SERUM POTASSIUM: CPT | Mod: 91,HCNC,NTX

## 2019-12-07 PROCEDURE — 86920 COMPATIBILITY TEST SPIN: CPT | Mod: HCNC,NTX

## 2019-12-07 PROCEDURE — 80069 RENAL FUNCTION PANEL: CPT | Mod: HCNC,NTX

## 2019-12-07 PROCEDURE — 90935 HEMODIALYSIS ONE EVALUATION: CPT

## 2019-12-07 PROCEDURE — 86850 RBC ANTIBODY SCREEN: CPT | Mod: HCNC,NTX

## 2019-12-07 PROCEDURE — A4216 STERILE WATER/SALINE, 10 ML: HCPCS | Mod: HCNC,NTX | Performed by: PHYSICIAN ASSISTANT

## 2019-12-07 PROCEDURE — 85025 COMPLETE CBC W/AUTO DIFF WBC: CPT | Mod: 91,HCNC,NTX

## 2019-12-07 PROCEDURE — 82728 ASSAY OF FERRITIN: CPT | Mod: HCNC,NTX

## 2019-12-07 PROCEDURE — 90935 HEMODIALYSIS ONE EVALUATION: CPT | Mod: HCNC,NTX,, | Performed by: NURSE PRACTITIONER

## 2019-12-07 PROCEDURE — 83540 ASSAY OF IRON: CPT | Mod: HCNC,NTX

## 2019-12-07 RX ORDER — SODIUM CHLORIDE 9 MG/ML
INJECTION, SOLUTION INTRAVENOUS
Status: DISCONTINUED | OUTPATIENT
Start: 2019-12-07 | End: 2019-12-09 | Stop reason: HOSPADM

## 2019-12-07 RX ORDER — SODIUM CHLORIDE 9 MG/ML
INJECTION, SOLUTION INTRAVENOUS ONCE
Status: DISCONTINUED | OUTPATIENT
Start: 2019-12-07 | End: 2019-12-09 | Stop reason: HOSPADM

## 2019-12-07 RX ORDER — INSULIN ASPART 100 [IU]/ML
4 INJECTION, SOLUTION INTRAVENOUS; SUBCUTANEOUS
Status: DISCONTINUED | OUTPATIENT
Start: 2019-12-07 | End: 2019-12-09 | Stop reason: HOSPADM

## 2019-12-07 RX ORDER — INSULIN ASPART 100 [IU]/ML
0-5 INJECTION, SOLUTION INTRAVENOUS; SUBCUTANEOUS
Status: DISCONTINUED | OUTPATIENT
Start: 2019-12-07 | End: 2019-12-09 | Stop reason: HOSPADM

## 2019-12-07 RX ADMIN — Medication 3 ML: at 06:12

## 2019-12-07 RX ADMIN — LEVETIRACETAM 1000 MG: 500 TABLET ORAL at 08:12

## 2019-12-07 RX ADMIN — ASPIRIN 81 MG: 81 TABLET, COATED ORAL at 06:12

## 2019-12-07 RX ADMIN — INSULIN ASPART 2 UNITS: 100 INJECTION, SOLUTION INTRAVENOUS; SUBCUTANEOUS at 10:12

## 2019-12-07 RX ADMIN — ACETAMINOPHEN 650 MG: 325 TABLET ORAL at 08:12

## 2019-12-07 RX ADMIN — ATORVASTATIN CALCIUM 40 MG: 20 TABLET, FILM COATED ORAL at 08:12

## 2019-12-07 RX ADMIN — SEVELAMER CARBONATE 1600 MG: 800 TABLET, FILM COATED ORAL at 11:12

## 2019-12-07 RX ADMIN — INSULIN ASPART 5 UNITS: 100 INJECTION, SOLUTION INTRAVENOUS; SUBCUTANEOUS at 11:12

## 2019-12-07 RX ADMIN — HEPARIN SODIUM 5000 UNITS: 5000 INJECTION, SOLUTION INTRAVENOUS; SUBCUTANEOUS at 06:12

## 2019-12-07 RX ADMIN — INSULIN ASPART 2 UNITS: 100 INJECTION, SOLUTION INTRAVENOUS; SUBCUTANEOUS at 08:12

## 2019-12-07 RX ADMIN — SEVELAMER CARBONATE 1600 MG: 800 TABLET, FILM COATED ORAL at 08:12

## 2019-12-07 RX ADMIN — Medication 3 ML: at 10:12

## 2019-12-07 RX ADMIN — FOLIC ACID 1 MG: 1 TABLET ORAL at 08:12

## 2019-12-07 RX ADMIN — SEVELAMER CARBONATE 1600 MG: 800 TABLET, FILM COATED ORAL at 06:12

## 2019-12-07 RX ADMIN — INSULIN ASPART 1 UNITS: 100 INJECTION, SOLUTION INTRAVENOUS; SUBCUTANEOUS at 09:12

## 2019-12-07 RX ADMIN — HEPARIN SODIUM 5000 UNITS: 5000 INJECTION, SOLUTION INTRAVENOUS; SUBCUTANEOUS at 09:12

## 2019-12-07 RX ADMIN — LIDOCAINE 1 PATCH: 50 PATCH CUTANEOUS at 10:12

## 2019-12-07 NOTE — PROGRESS NOTES
"[facility]  Vascular Surgery  Progress Note    Patient Name: Lucy Perez  MRN: [id number]  Admission Date: 12/4/2019  Primary Care Provider: Beverly Muniz MD    Subjective:     Interval History: No acute events overnight. Had episode of emesis during dialysis but was able to be successfully dialyzed via graft. Had increased bleeding with dialysis.    Post-Op Info:  Procedure(s) (LRB):  Fistulogram (Right)  DECLOT-GRAFT (Right)   1 Day Post-Op     Medications Prior to Admission   Medication Sig Dispense Refill Last Dose    acetaminophen (TYLENOL) 325 MG tablet Take 2 tablets (650 mg total) by mouth every 6 (six) hours as needed for Pain.  0 12/3/2019    albuterol (VENTOLIN HFA) 90 mcg/actuation inhaler Inhale 2 puffs into the lungs every 6 (six) hours as needed for Wheezing. Rescue 18 g 0 12/3/2019    aspirin (ECOTRIN) 81 MG EC tablet Take 81 mg by mouth every morning.    12/3/2019    atorvastatin (LIPITOR) 40 MG tablet TAKE 1 TABLET ONE TIME DAILY FOR CHOLESTEROL 90 tablet 2 12/3/2019    BD INSULIN PEN NEEDLE UF SHORT 31 gauge x 5/16" Ndle USE TO INJECT NOVOLOG FLEXPEN BEFORE MEALS 150 each 11 12/3/2019    bisacodyl (DULCOLAX) 10 mg Supp Place 1 suppository (10 mg total) rectally daily as needed. 30 suppository 3 12/3/2019    blood sugar diagnostic Strp To check BG 4  times daily, to use with insurance preferred meter-true metrix 400 each 3 12/3/2019    blood-glucose meter kit To check BG 4 times daily, to use with insurance preferred meter-true metrix 1 each 1 12/3/2019    citalopram (CELEXA) 20 MG tablet Take 1 tablet (20 mg total) by mouth once daily. 1 tab daily for depression 30 tablet 4 12/3/2019    ergocalciferol (ERGOCALCIFEROL) 50,000 unit Cap Take 1 capsule (50,000 Units total) by mouth every 7 days.   12/3/2019    famotidine (PEPCID) 20 MG tablet Take 1 tablet (20 mg total) by mouth once daily. 90 tablet 2 12/3/2019    FLUoxetine 10 MG capsule Take 10 mg by " mouth once daily.  4 12/3/2019    FLUoxetine 10 MG Tab Take 1 tablet (10 mg total) by mouth once daily. 30 tablet 4 12/3/2019    glipiZIDE (GLUCOTROL) 2.5 MG TR24 Take 1 tablet (hold if less than 150) at lunch (non dialysis days) 30 tablet 4 12/3/2019    insulin aspart U-100 (NOVOLOG U-100 INSULIN ASPART) 100 unit/mL injection Use correction scale 180-230+1, 231-280+2, 281-330+3, 331-380+4, >380+5, max 15 units. 3 vial 3 12/3/2019    insulin glargine (LANTUS U-100 INSULIN) 100 unit/mL injection Inject 10-12 Units into the skin every evening. 6 mL 6 12/3/2019    lancets Misc 1 lancet by Misc.(Non-Drug; Combo Route) route 2 (two) times daily with meals. Check glucose before meals and bed 300 each 4 12/3/2019    levETIRAcetam (KEPPRA) 1000 MG tablet Take 1 tablet (1,000 mg total) by mouth once daily. 90 tablet 3 12/3/2019    levETIRAcetam (KEPPRA) 500 MG Tab Take 1 tablet (500 mg total) by mouth every Mon, Wed, Fri. Monday Wednesday and Friday after DIALYSIS take additional 500mg 36 tablet 3 12/3/2019    ondansetron (ZOFRAN-ODT) 4 MG TbDL Take 1 tablet (4 mg total) by mouth every 8 (eight) hours as needed. 10 tablet 0 12/3/2019    polyethylene glycol (MIRALAX) 17 gram/dose powder Take 17 g by mouth 2 (two) times daily. 1 Bottle 6 12/3/2019    pregabalin (LYRICA) 25 MG capsule Take 1 capsule (25 mg total) by mouth once daily. May take additional dose after HD. (Patient taking differently: Take 25 mg by mouth daily as needed. May take additional dose after HD.) 90 capsule 4 12/3/2019    sevelamer carbonate (RENVELA) 800 mg Tab Take 800 mg by mouth 3 (three) times daily with meals.   12/3/2019    folic acid (FOLVITE) 1 MG tablet Take 1 tablet (1 mg total) by mouth once daily. 90 tablet 2 Taking       Review of patient's allergies indicates:   Allergen Reactions    Lisinopril Other (See Comments)     Angioedema      Vicodin [hydrocodone-acetaminophen] Rash     No problem with acetaminophen        Past Medical  History:   Diagnosis Date    Anemia of chronic disease 6/10/2017    Anxiety     Arthritis of right acromioclavicular joint 7/2/2014    Asthma     Bipolar disorder     Bronchitis, acute     Cataract     Cholelithiasis     Chronic diastolic CHF (congestive heart failure)     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    Cortical cataract of both eyes 7/26/2016    Decubitus ulcer of buttock, stage 2     Degeneration of lumbar or lumbosacral intervertebral disc 3/5/2013    S/p MRI L-spine 5/2009     Depression     Encounter for blood transfusion     ESRD on hemodialysis     Started August 2018    General anesthetics causing adverse effect in therapeutic use     Hemorrhagic cerebrovascular accident (CVA)     8/2016 s/p Hemicraniotomy at [facility] with Left hemiparesis    History of stroke 6/28/2017    s/p R-MCA stroke with R-putaminal hemorrhagic transformation in 8/2016 and 11/2016 (s/p hemicraniotomy at [facility] [place] [facility]) with residual L hemiparesis, on AED s/p CVA      Hypertensive retinopathy of both eyes 7/26/2016    Impingement syndrome of right shoulder 7/2/2014    Obesity     THERESA (obstructive sleep apnea) 3/5/2013    No Home CPAP 2ndary to cost     Partial symptomatic epilepsy with complex partial seizures, not intractable, without status epilepticus     Rheumatoid arthritis(714.0)     Rotator cuff tear 7/2/2014    Sarcoidosis     Stroke 2016    left sided flaccidity, SAH    Vertebral artery stenosis 3/5/2013    S/p Stenting per Dr Burnett      Past Surgical History:   Procedure Laterality Date    BREAST SURGERY      breast reduction    COLONOSCOPY N/A 8/11/2016    Procedure: COLONOSCOPY;  Surgeon: Jerry Vilchis MD;  Location: [address]);  Service: Endoscopy;  Laterality: N/A;  Patient reports difficulty awaking from anesthesia in the past.    DECLOTTING OF VASCULAR GRAFT Right 6/20/2019    Procedure: DECLOT-GRAFT;  Surgeon: Cabrera Irwin MD;   Location: [facility] CATH LAB;  Service: Cardiology;  Laterality: Right;    FISTULOGRAM Right 2/11/2019    Procedure: Fistulogram;  Surgeon: Meir Valencia MD;  Location: [facility] CATH LAB;  Service: Peripheral Vascular;  Laterality: Right;    FISTULOGRAM Right 7/8/2019    Procedure: Fistulogram;  Surgeon: WELLINGTON Palm III, MD;  Location: [facility] CATH LAB;  Service: Peripheral Vascular;  Laterality: Right;    HYSTERECTOMY  1999    JOSE LUIS/BSO (AUB)    PLACEMENT OF ARTERIOVENOUS GRAFT Right 10/18/2018    Procedure: AV GRAFT CREATION;  Surgeon: Meir Valencia MD;  Location: [facility] OR [facility] FLR;  Service: Cardiovascular;  Laterality: Right;    ROTATOR CUFF REPAIR Right July 9, 2014    right side    Skull surgery      Aneurysm    stent placed      in vertebral artery    TOTAL REDUCTION MAMMOPLASTY      TUBAL LIGATION       Family History     Problem Relation (Age of Onset)    Bell's palsy Sister    Blindness Maternal Grandmother    Breast cancer Mother    Diabetes Mother, Son,     Heart attack Mother    Heart disease Mother    Hypertension Mother, Sister    Lupus Sister    No Known Problems Daughter    Sleep apnea Sister    Stroke Sister, Maternal Aunt        Tobacco Use    Smoking status: Never Smoker    Smokeless tobacco: Never Used   Substance and Sexual Activity    Alcohol use: Yes     Comment: occasional wine cooler     Drug use: Never    Sexual activity: Yes     Partners: Male     Birth control/protection: Post-menopausal     Review of Systems   Constitutional: Negative for chills and fever.   Respiratory: Negative for chest tightness and shortness of breath.    Cardiovascular: Negative for chest pain.   Gastrointestinal: Negative for abdominal pain.   Musculoskeletal: Negative for arthralgias.   Skin: Negative for color change.   Neurological: Negative for dizziness and headaches.   Psychiatric/Behavioral: Negative for agitation.     Objective:     Vital Signs (Most Recent):  Temp: 99.2 °F (37.3 °C) (12/07/19  0524)  Pulse: 86 (12/07/19 0524)  Resp: 17 (12/07/19 0524)  BP: (!) 110/59 (12/07/19 0524)  SpO2: 95 % (12/07/19 0524) Vital Signs (24h Range):  Temp:  [97.8 °F (36.6 °C)-99.2 °F (37.3 °C)] 99.2 °F (37.3 °C)  Pulse:  [71-86] 86  Resp:  [14-22] 17  SpO2:  [93 %-98 %] 95 %  BP: (109-177)/(56-88) 110/59     Weight: 93.4 kg (205 lb 14.6 oz)  Body mass index is 36.48 kg/m².    Physical Exam   Constitutional: She is oriented to person, place, and time. She appears well-developed and well-nourished.   Cardiovascular: Normal rate.   Pulmonary/Chest: Effort normal.   Musculoskeletal:   RUE AVG with palpable thrill.   Neurological: She is alert and oriented to person, place, and time.   Nursing note and vitals reviewed.      Significant Labs:  CBC:   Recent Labs   Lab 12/07/19  0322   WBC 7.93   RBC 2.39*   HGB 7.6*   HCT 25.6*      *   MCH 31.8*   MCHC 29.7*     CMP:   Recent Labs   Lab 12/06/19  0848  12/06/19  1642  12/07/19  0322   *   < > 99  --   --    CALCIUM 7.6*   < > 7.5*  --   --    ALBUMIN 3.3*  --   --   --   --    *   < > 146*  --   --    K 5.9*   < > 4.1   < > 4.2  4.2   CO2 20*   < > 26  --   --       < > 99  --   --    *   < > 115*  --   --    CREATININE 14.1*   < > 13.9*  --   --     < > = values in this interval not displayed.       Significant Diagnostics:  I have reviewed all pertinent imaging results/findings within the past 24 hours.    Assessment/Plan:     ESRD (end stage renal disease)  60yo female with DM2, ESRD TTHS, CVA with residual L sided hemiparesis, seizures, and depression who presents to the ED for dialysis access now s/p declot 12/6    - graft with good thrill, tolerated dialysis overnight; ok to discharge from vascular surgery standpoint  - follow up in 2 weeks with Dr. Valencia with RUE hemodialysis US   - rest of care per primary team/nephrology  - will continue to follow along while inpatient, please call with questions      Mariah Brown,  MD  Vascular Surgery  [facility]

## 2019-12-07 NOTE — PROGRESS NOTES
Pt AAOx3 with delayed resonses. Pt free of falls/trauma/injuries. Vital signs were in stable condition. Bed in low position, wheels locked, and call light within reach. Skin integrity remains unchanged. Pt had a high potassium of 6.7 MD Giles notified. Potassium shift performed. Pt tolerated well. Pt turned Q2 hours while on floor. Pt to move to a different room on this floor. Pt belongings will be moved to that room when nightshift comes and the room is cleaned. Nightshift aware of pt situation. No distress noted/reported at this time. WCTM.

## 2019-12-07 NOTE — PROGRESS NOTES
Hemodialysis    Bedside HD treatment in room 655A.  Patient just got in from the OR post declotting of the right AVgraft. Incision site from OR is still slightly bleeding. Access with bruit and thrill. Cannulated using gauge 15 needles smoothly.    HD started

## 2019-12-07 NOTE — PROGRESS NOTES
[facility] Hospital Medicine  Progress Note    Patient Name: Lucy Perez  MRN: [id number]  Patient Class: IP- Inpatient   Admission Date: 12/4/2019  Length of Stay: 2 days  Attending Physician: Angelo Veronica MD  Primary Care Provider: Beverly Muniz MD    [facility] Medicine Team: [facility] E Tisha Aguilera PA-C    Subjective:     Principal Problem:Hyperkalemia        HPI:  59 year old female with a PMHx of DM2, ESRD TTHS, CVA with residual L sided hemiparesis, seizures, and depression presenting to the ER with  at bedside for dialysis. Patient's last HD session was Saturday 11/30. She has been transitioning from [facility] to [facility] for outpatient HD. Unfortunately, [facility] has not received all the paperwork needed for her to transition dialysis there. She presents to the ER today to have dialysis. At the time of my exam, patient is resting comfortably in bed with  at bedside. She has no complaints. Denies CP, SOB, abdominal pain, N/V/D, fever/chills, dysuria, palpitations, syncope, headache, paresthesias, and new weakness.      HDS on admission, afebrile without leukocytosis. Labs remarkable for chronic anemia, K 5.8, , and phos 7.7.     Overview/Hospital Course:  Patient admitted for hyperkalemia in setting of ESRD on HD. Unfortunately patient is in the process of switching HD centers and she was unable to be dialyzed outpt. K 5.8 on admission, improved to 5.3 with kayexalate/calcium gluconate. Patient's AVG found to be clotted, vascular surgery consulted. Appreciate assistance, pt declotted with Vasc Surg 12/6. Nephrology following as well. Pt had bleeding with HD after declot, hgb 7.6 12/7. Discussed with nephrology- check iron panels, will give EPO in HD. T/S&C as precaution, monitor for bleeding during HD and transfuse if needed.    No new subjective & objective note has been filed under this hospital service since the last note was  generated.      Assessment/Plan:      * Hyperkalemia  ESRD TTHS (last HD 11/30)  Anemia in ESRD  Acute on chronic HFpEF   Problem with vascular access  - HDS on admission, stable on RA  - Labs remarkable for mild hyperkalemia 5.8, phos 7.7  - Nephrology consulted for maintenance HD  - AVG to RUE without bruit or thrill; vascular surg consulted, appreciate assistance  - Will give kayexalate x1 > repeat K 5.3  -  increase renvela in the mean time from 800mg TIDWM to 1600TIDWM (12/5)  12/6: K+ 5.9>6.7; shifted; repeat K+ 4.1  - fistulogram today per vasc surg: pt successfully declotted  - pt underwent HD  12/7: HD again today  -  drop in hgb 8.2>7.6, suspect 2/2 to reported bleeding overnight; discussed with nephro will check iron/give epo; watch for bleeding during PM HD session; T&S/C  - Renal/DM diet when eating  - Strict I/Os, daily weights    *CM/SW to fax additional paperwork to [employer]; completed, pt is set up for HD outpt at [facility]     Type 2 diabetes mellitus with chronic kidney disease on chronic dialysis, with long-term current use of insulin  - Last A1c 6.8 in 8/2019; repeat 7.8 today  -  on admit; patient endorses not taking home insulin  - 13U detemir qdaily, aspart 4U TID WM, low dose SSI  - Renal/DM diet  - Monitor BGs and adjust insulin PRN  12/5-12/6: BG at goal, monitor for hypoglycemia  12/7: BG 400s, increase prandial and add sliding scale    History of stroke  L sided hemiparesis  HLD  Wheelchair bound  - Continue ASA and lipitor    Partial symptomatic epilepsy with complex partial seizures, not intractable, without status epilepticus  - No recent seizure activity  - Patient reports taking keppra 500 mg BID on HD days and 100 mg daily on other days  - Will continue    Mixed anxiety and depressive disorder  - Chronic and stable  - Patient and  are unsure if taking celexa or prozac at home; daughter to confirm and notify      VTE Risk Mitigation (From admission, onward)          Ordered     heparin (porcine) injection 5,000 Units  Every 8 hours      12/04/19 1501     Place sequential compression device  Until discontinued      12/04/19 1501     Place LUIS hose  Until discontinued      12/04/19 1501     IP VTE HIGH RISK PATIENT  Once      12/04/19 1501                    discussd with staf/nephro/vasc surg  Tisha Aguilera PA-C  Department of Hospital Medicine   [facility]

## 2019-12-07 NOTE — PROGRESS NOTES
Report given to nightshift nurse Karthik about pt. Pt is moving rooms. Pt stuff is still in 1108 since the room in 1157 is not clean. Karthik said he would move pt belongings when new room is clean.

## 2019-12-07 NOTE — PROGRESS NOTES
HD treatment complete. Duration of treatment 3.5 hours and 2 L removed. Treatment was tolerated well and no complications with access to right upper arm. Needles removed and hemostasis achieved. Dressing intact and no drainage noted. Thrill and bruit present.

## 2019-12-07 NOTE — TRANSFER OF CARE
"Anesthesia Transfer of Care Note    Patient: Lucy Perez    Procedure(s) Performed: Procedure(s) (LRB):  Fistulogram (Right)  DECLOT-GRAFT (Right)    Patient location: [facility]    Anesthesia Type: general    Transport from OR: Transported from OR on 6-10 L/min O2 by face mask with adequate spontaneous ventilation    Post pain: adequate analgesia    Post assessment: no apparent anesthetic complications    Post vital signs: stable    Level of consciousness: awake and alert    Nausea/Vomiting: no nausea/vomiting    Complications: none    Transfer of care protocol was followed      Last vitals:   Visit Vitals  /63 (BP Location: Left arm, Patient Position: Lying)   Pulse 83   Temp 36.9 °C (98.4 °F) (Temporal)   Resp 17   Ht 5' 3" (1.6 m)   Wt 93.4 kg (205 lb 14.6 oz)   LMP  (LMP Unknown)   SpO2 96%   Breastfeeding? No   BMI 36.48 kg/m²     "

## 2019-12-07 NOTE — ASSESSMENT & PLAN NOTE
58yo female with DM2, ESRD TTHS, CVA with residual L sided hemiparesis, seizures, and depression who presents to the ED for dialysis access now s/p declot 12/6    - graft with good thrill, tolerated dialysis overnight; ok to discharge from vascular surgery standpoint  - follow up in 2 weeks with Dr. Valencia with [facility] hemodialysis US   - rest of care per primary team/nephrology  - will continue to follow along while inpatient, please call with questions

## 2019-12-07 NOTE — PROGRESS NOTES
HD treatment started. No complications with access to right upper arm. Lines secured and telemetry in place. Complaints of pain at access site.

## 2019-12-07 NOTE — SUBJECTIVE & OBJECTIVE
"Medications Prior to Admission   Medication Sig Dispense Refill Last Dose    acetaminophen (TYLENOL) 325 MG tablet Take 2 tablets (650 mg total) by mouth every 6 (six) hours as needed for Pain.  0 12/3/2019    albuterol (VENTOLIN HFA) 90 mcg/actuation inhaler Inhale 2 puffs into the lungs every 6 (six) hours as needed for Wheezing. Rescue 18 g 0 12/3/2019    aspirin (ECOTRIN) 81 MG EC tablet Take 81 mg by mouth every morning.    12/3/2019    atorvastatin (LIPITOR) 40 MG tablet TAKE 1 TABLET ONE TIME DAILY FOR CHOLESTEROL 90 tablet 2 12/3/2019    BD INSULIN PEN NEEDLE UF SHORT 31 gauge x 5/16" Ndle USE TO INJECT NOVOLOG FLEXPEN BEFORE MEALS 150 each 11 12/3/2019    bisacodyl (DULCOLAX) 10 mg Supp Place 1 suppository (10 mg total) rectally daily as needed. 30 suppository 3 12/3/2019    blood sugar diagnostic Strp To check BG 4  times daily, to use with insurance preferred meter-true metrix 400 each 3 12/3/2019    blood-glucose meter kit To check BG 4 times daily, to use with insurance preferred meter-true metrix 1 each 1 12/3/2019    citalopram (CELEXA) 20 MG tablet Take 1 tablet (20 mg total) by mouth once daily. 1 tab daily for depression 30 tablet 4 12/3/2019    ergocalciferol (ERGOCALCIFEROL) 50,000 unit Cap Take 1 capsule (50,000 Units total) by mouth every 7 days.   12/3/2019    famotidine (PEPCID) 20 MG tablet Take 1 tablet (20 mg total) by mouth once daily. 90 tablet 2 12/3/2019    FLUoxetine 10 MG capsule Take 10 mg by mouth once daily.  4 12/3/2019    FLUoxetine 10 MG Tab Take 1 tablet (10 mg total) by mouth once daily. 30 tablet 4 12/3/2019    glipiZIDE (GLUCOTROL) 2.5 MG TR24 Take 1 tablet (hold if less than 150) at lunch (non dialysis days) 30 tablet 4 12/3/2019    insulin aspart U-100 (NOVOLOG U-100 INSULIN ASPART) 100 unit/mL injection Use correction scale 180-230+1, 231-280+2, 281-330+3, 331-380+4, >380+5, max 15 units. 3 vial 3 12/3/2019    insulin glargine (LANTUS U-100 INSULIN) 100 " unit/mL injection Inject 10-12 Units into the skin every evening. 6 mL 6 12/3/2019    lancets Misc 1 lancet by Misc.(Non-Drug; Combo Route) route 2 (two) times daily with meals. Check glucose before meals and bed 300 each 4 12/3/2019    levETIRAcetam (KEPPRA) 1000 MG tablet Take 1 tablet (1,000 mg total) by mouth once daily. 90 tablet 3 12/3/2019    levETIRAcetam (KEPPRA) 500 MG Tab Take 1 tablet (500 mg total) by mouth every Mon, Wed, Fri. Monday Wednesday and Friday after DIALYSIS take additional 500mg 36 tablet 3 12/3/2019    ondansetron (ZOFRAN-ODT) 4 MG TbDL Take 1 tablet (4 mg total) by mouth every 8 (eight) hours as needed. 10 tablet 0 12/3/2019    polyethylene glycol (MIRALAX) 17 gram/dose powder Take 17 g by mouth 2 (two) times daily. 1 Bottle 6 12/3/2019    pregabalin (LYRICA) 25 MG capsule Take 1 capsule (25 mg total) by mouth once daily. May take additional dose after HD. (Patient taking differently: Take 25 mg by mouth daily as needed. May take additional dose after HD.) 90 capsule 4 12/3/2019    sevelamer carbonate (RENVELA) 800 mg Tab Take 800 mg by mouth 3 (three) times daily with meals.   12/3/2019    folic acid (FOLVITE) 1 MG tablet Take 1 tablet (1 mg total) by mouth once daily. 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    Cortical cataract of both eyes 7/26/2016    Decubitus ulcer of buttock, stage 2     Degeneration of lumbar or  lumbosacral intervertebral disc 3/5/2013    S/p MRI L-spine 5/2009     Depression     Encounter for blood transfusion     ESRD on hemodialysis     Started August 2018    General anesthetics causing adverse effect in therapeutic use     Hemorrhagic cerebrovascular accident (CVA)     8[place]2016 [place] [facility] [place]is    History of stroke 6/28/2017    s/p R-MCA stroke with R-putaminal hemorrhagic transformation in 8/2016 and 11/2016 (s/[place] [facility] [place] with residual L hemiparesis, on AED s/p CVA      Hypertensive retinopathy of both eyes 7/26/2016    Impingement syndrome of right shoulder 7/2/2014    Obesity     THERESA (obstructive sleep apnea) 3/5/2013    No Home CPAP 2ndary to cost     Partial symptomatic epilepsy with complex partial seizures, not intractable, without status epilepticus     Rheumatoid arthritis(714.0)     Rotator cuff tear 7/2/2014    Sarcoidosis     Stroke 2016    left sided flaccidity, SAH    Vertebral artery stenosis 3/5/2013    S/p Stenting per Dr Burnett      Past Surgical History:   Procedure Laterality Date    BREAST SURGERY      breast reduction    COLONOSCOPY N/A 8/11/2016    Procedure: COLONOSCOPY;  Surgeon: Jerry Vilchis MD;  Location: [facility] ENDO ([facility] FLR);  Service: Endoscopy;  Laterality: N/A;  Patient reports difficulty awaking from anesthesia in the past.    DECLOTTING OF VASCULAR GRAFT Right 6/20/2019    Procedure: DECLOT-GRAFT;  Surgeon: Cabrera Irwin MD;  Location: [facility] CATH LAB;  Service: Cardiology;  Laterality: Right;    FISTULOGRAM Right 2/11/2019    Procedure: Fistulogram;  Surgeon: Meir Valencia MD;  Location: [facility] CATH LAB;  Service: Peripheral Vascular;  Laterality: Right;    FISTULOGRAM Right 7/8/2019    Procedure: Fistulogram;  Surgeon: WELLINGTON Palm III, MD;  Location: [facility] CATH LAB;  Service: Peripheral Vascular;  Laterality: Right;    HYSTERECTOMY  1999    JOSE LUIS/BSO (AUB)    PLACEMENT OF ARTERIOVENOUS GRAFT  Right 10/18/2018    Procedure: AV GRAFT CREATION;  Surgeon: Meir Valencia MD;  Location: [facility] OR [address] [place] [address];  Service: Cardiovascular;  Laterality: Right;    ROTATOR CUFF REPAIR Right July 9, 2014    right side    Skull surgery      Aneurysm    stent placed      in vertebral artery    TOTAL REDUCTION MAMMOPLASTY      TUBAL LIGATION       Family History     Problem Relation (Age of Onset)    Bell's palsy Sister    Blindness Maternal Grandmother    Breast cancer Mother    Diabetes Mother, Son,     Heart attack Mother    Heart disease Mother    Hypertension Mother, Sister    Lupus Sister    No Known Problems Daughter    Sleep apnea Sister    Stroke Sister, Maternal Aunt        Tobacco Use    Smoking status: Never Smoker    Smokeless tobacco: Never Used   Substance and Sexual Activity    Alcohol use: Yes     Comment: occasional wine cooler     Drug use: Never    Sexual activity: Yes     Partners: Male     Birth control/protection: Post-menopausal     Review of Systems   Constitutional: Negative for chills and fever.   Respiratory: Negative for chest tightness and shortness of breath.    Cardiovascular: Negative for chest pain.   Gastrointestinal: Negative for abdominal pain.   Musculoskeletal: Negative for arthralgias.   Skin: Negative for color change.   Neurological: Negative for dizziness and headaches.   Psychiatric/Behavioral: Negative for agitation.     Objective:     Vital Signs (Most Recent):  Temp: 99.2 °F (37.3 °C) (12/07/19 0524)  Pulse: 86 (12/07/19 0524)  Resp: 17 (12/07/19 0524)  BP: (!) 110/59 (12/07/19 0524)  SpO2: 95 % (12/07/19 0524) Vital Signs (24h Range):  Temp:  [97.8 °F (36.6 °C)-99.2 °F (37.3 °C)] 99.2 °F (37.3 °C)  Pulse:  [71-86] 86  Resp:  [14-22] 17  SpO2:  [93 %-98 %] 95 %  BP: (109-177)/(56-88) 110/59     Weight: 93.4 kg (205 lb 14.6 oz)  Body mass index is 36.48 kg/m².    Physical Exam   Constitutional: She is oriented to person, place, and time. She appears well-developed and  well-nourished.   Cardiovascular: Normal rate.   Pulmonary/Chest: Effort normal.   Musculoskeletal:   RUE AVG with palpable thrill. 2+ R radial pulse   Neurological: She is alert and oriented to person, place, and time.   Nursing note and vitals reviewed.      Significant Labs:  CBC:   Recent Labs   Lab 12/07/19  0322   WBC 7.93   RBC 2.39*   HGB 7.6*   HCT 25.6*      *   MCH 31.8*   MCHC 29.7*     CMP:   Recent Labs   Lab 12/06/19  0848  12/06/19  1642  12/07/19  0322   *   < > 99  --   --    CALCIUM 7.6*   < > 7.5*  --   --    ALBUMIN 3.3*  --   --   --   --    *   < > 146*  --   --    K 5.9*   < > 4.1   < > 4.2  4.2   CO2 20*   < > 26  --   --       < > 99  --   --    *   < > 115*  --   --    CREATININE 14.1*   < > 13.9*  --   --     < > = values in this interval not displayed.       Significant Diagnostics:  I have reviewed all pertinent imaging results/findings within the past 24 hours.

## 2019-12-07 NOTE — PROGRESS NOTES
Hemodialysis Note  Pt seen and evaluated while undergoing dialysis. Tolerating dialysis with current UFR, without complications. Labs reviewed and dialysate bath adjusted.     K+ 5.3  BFR: 350  UF goal: 2L as tolerated; pt s/p declot and HD yesterday; here for add'l HD  Anemia: Hgb 7.6; iron panel; will start epogen; maintain Hgb>7; managed by Hospital Medicine  MBD: continue sevelamer carbonate  HTN: BP 82/49; managed by Hospital Medicine  Diet: renal; start Novasource renal

## 2019-12-07 NOTE — OP NOTE
12/6/2019      Pre-operative Diagnosis:    1. ESRD on Dialysis   2. AV graft thrombosis    Post-operative Diagnosis:   same.    Procedures:  1) U/S-guided access LUE AV graft/Second access for intervention.   2) fistulogram  3) Percutaneous thrombolysis and mechanical thrombectomy w Possis Angiojet AVX   4) #4 over-the-wire Carmelina thrombectomy of the arterial inflow of AV graft.        5) PTA midgraft stenosis (8 x 60 mm [place])  6) PTA innominate instent stenosis (12 x 40mm mustang)    Surgeon:  Cabrera Irwin MD     Assistants:  Umang Biggs MD    Anesthesia: RN IV sedation    Findings/Key elements:   Arterial anastomosis thrombosis/stenosis requiring multiple passes of Carmelina balloon  Mid graft stenosis requiring PTA  In stent stenosis in previously placed innominate vein stent requiring PTA    EBL:  100 mL           Complications:  None; patient tolerated the procedure well.           Disposition:  Return to nursing unit.           Condition: stable    Procedure Details:  The patient was seen in the Holding Room. The risks, benefits, complications, treatment options, and expected outcomes were discussed with the patient. The patient concurred with the proposed plan, giving informed consent.  The site of surgery properly noted/marked.   Patient was brought to the hybrid operating room and positioned supine on the table. Local and mac anesthesia was administered by anesthesia team. Patient's right arm was prepped and draped in usual sterile fashion. A Time Out was held and the above information confirmed.    The area overlying the planned access site was anesthetized using 1% lidocaine.  The distal aspect of the left upper extremity AVG (by the arterial anastomosis) was accessed in the Venous facing direction with a micropuncture needle and wire, with confirmation of placement with fluoroscopy. This was followed by the micropuncture sheath. The micropuncture wire was exchanged for a J wire and the  micropuncture sheath was exchanged for a 6 [place] short sheath. A 0.035 in Glidewire with Glide cathter was used to advance wire into the superior vena cava.  The patient was anticoagulated with IV heparin. Next the Possis angioget catheter was used in pulse spray mode to instill tPA throughout entire length of graft.      Next attention turned to arterial facing direction of the left upper extremity AVG. The area overlying the planned access site was anesthetized using 1% lidocaine. Using palpation, the central aspect of the left upper extremity AVG was accessed in an Arterial facing direction with a micropuncture needle and wire, with placement confirmed by fluoroscopy, followed by the micropuncture sheath. The micropuncture wire was exchanged for a J wire and the micropuncture sheath was exchanged for a 6 [place] short sheath. The J wire was exchanged for an 0.018 Advantage wire and with the assistance of an angled glide catheter the wire was threaded through the thrombosed AV graft and into the ulnar artery. An #4 over the wire darci was placed through the arterial facing sheath over the wire and carefully inflated while drawing back toward the sheath to perform thrombectomy of the arterial facing end of the AV graft . This step was repeated multiple times until all thrombus was felt to be removed.     Next the Possis Angiojet AVX catheter was placed through the venous facing sheath and the clot was aspirated with percutaneous mechanical thrombectomy. This was repeated several times to ensure adequate thrombectomy.    Follow up fistulogram demonstrated residual thrombus in the arterial facing portion of graft, mid graft stenosis.  We accessed the graft again more centrally towards the venous anastomosis. A 6 [place]ar[place]uan sheath was placed here.  We repeated our Darci embolectomy.  A 7 x 60 mm Kleberg balloon was used to treat midgraft stenosis with PTA.     We performed a central venogram which demonstrated InStent  restenoses of a previously placed innominate vein stent.  This was treated with 12 by 40 mm mustang balloon PTA.    Completion fistulogram demonstrated resolution of midgraft and venous outflow stenosis with brisk washout of contrast.  Palpable thrill in left femoral AV graft.  Two U stitches of 3-0 Monocryl were placed around sheaths and sheaths were removed.  Pressure held on access sites for 5 minutes with good hemostasis.  Sterile dressing applied and patient taken to PACU in stable condition.     Dr. Irwin was scrubbed and present for the procedure.    Umang Biggs MD PGY6  Vascular and Endovascular Surgery Fellow  12/06/2019

## 2019-12-07 NOTE — PROGRESS NOTES
Hemodialysis    3-hour HD completed, patient vomited food content so as her blood pressure dropped 1 hour prior to end of treatment.  Issue was resolved by lowering the UF goal.    Right AV graft with bruit and thrill, decannulation done, hemostasis achieved within 5 mins. Access gauzed and taped.    NET UF REMOVED: 2 liters   Report given to primary nurse

## 2019-12-07 NOTE — ANESTHESIA POSTPROCEDURE EVALUATION
Anesthesia Post Evaluation    Patient: Lucy Perez    Procedure(s) Performed: Procedure(s) (LRB):  Fistulogram (Right)  DECLOT-GRAFT (Right)    Final Anesthesia Type: general    Patient location during evaluation: floor  Patient participation: Yes- Able to Participate  Level of consciousness: awake and alert  Post-procedure vital signs: reviewed and stable  Pain management: adequate  Airway patency: patent    PONV status at discharge: No PONV  Anesthetic complications: no      Cardiovascular status: blood pressure returned to baseline  Respiratory status: spontaneous ventilation and room air  Hydration status: euvolemic  Follow-up not needed.          Vitals Value Taken Time   /59 12/7/2019  5:24 AM   Temp 37.3 °C (99.2 °F) 12/7/2019  5:24 AM   Pulse 86 12/7/2019  5:24 AM   Resp 17 12/7/2019  5:24 AM   SpO2 95 % 12/7/2019  5:24 AM         No case tracking events are documented in the log.      Pain/Samia Score: Pain Rating Prior to Med Admin: 8 (12/6/2019  8:33 PM)  Samia Score: 10 (12/6/2019  7:00 PM)

## 2019-12-07 NOTE — SUBJECTIVE & OBJECTIVE
Interval History: Pt had bleeding overnight with HD. Pt seen at [facility] this AM, she reports soaking bedding with blood during bleeding episode last night. Discussed with nephro, will give epo in hd check iron panels and monitor hgb through tomorrow. HD session this PM.     Review of Systems   Constitutional: Negative for fatigue and fever.   Respiratory: Negative for cough and shortness of breath.    Cardiovascular: Negative for chest pain, palpitations and leg swelling.   Gastrointestinal: Negative for abdominal pain, diarrhea, nausea and vomiting.   Genitourinary: Positive for decreased urine volume (ESRD). Negative for hematuria.   Musculoskeletal: Positive for back pain (chronic) and gait problem (wheelchair bound since CVA).   Skin: Negative for rash and wound.   Neurological: Positive for weakness (chronic L sided weakness s/p CVA). Negative for syncope and headaches.   Psychiatric/Behavioral: Negative for agitation. The patient is not nervous/anxious.      Objective:     Vital Signs (Most Recent):  Temp: 99 °F (37.2 °C) (12/07/19 0805)  Pulse: 73 (12/07/19 1152)  Resp: 18 (12/07/19 0805)  BP: (!) 114/56 (12/07/19 0805)  SpO2: (!) 94 % (12/07/19 0805) Vital Signs (24h Range):  Temp:  [98 °F (36.7 °C)-99.2 °F (37.3 °C)] 99 °F (37.2 °C)  Pulse:  [72-91] 73  Resp:  [17-22] 18  SpO2:  [94 %-98 %] 94 %  BP: (109-177)/(56-88) 114/56     Weight: 93.4 kg (205 lb 14.6 oz)  Body mass index is 36.48 kg/m².    Intake/Output Summary (Last 24 hours) at 12/7/2019 1240  Last data filed at 12/7/2019 1222  Gross per 24 hour   Intake 1230 ml   Output 2530 ml   Net -1300 ml      Physical Exam   Constitutional: She is oriented to person, place, and time. She appears well-developed and well-nourished. No distress.   HENT:   Head: Normocephalic and atraumatic.   Eyes: Right eye exhibits no discharge. Left eye exhibits no discharge.   Neck: Normal range of motion. Neck supple.   Cardiovascular: Normal rate, regular rhythm and normal  heart sounds.   RUE AVG palpable thrill   Pulmonary/Chest: Effort normal. No respiratory distress.   Abdominal: Soft. Bowel sounds are normal. She exhibits no distension. There is no tenderness.   Musculoskeletal: Normal range of motion. She exhibits no edema or tenderness.   Neurological: She is alert and oriented to person, place, and time.   Chronic L sided hemiparesis   Skin: Skin is warm and dry. She is not diaphoretic.   Psychiatric: She has a normal mood and affect.       Significant Labs: All pertinent labs within the past 24 hours have been reviewed.    Significant Imaging: I have reviewed all pertinent imaging results/findings within the past 24 hours.

## 2019-12-07 NOTE — ASSESSMENT & PLAN NOTE
- Last A1c 6.8 in 8/2019; repeat 7.8 today  -  on admit; patient endorses not taking home insulin  - 13U detemir qdaily, aspart 4U TID WM, low dose SSI  - Renal/DM diet  - Monitor BGs and adjust insulin PRN  12/5-12/6: BG at goal, monitor for hypoglycemia  12/7: BG 400s, increase prandial and add sliding scale

## 2019-12-07 NOTE — ASSESSMENT & PLAN NOTE
ESRD TTHS (last HD 11/30)  Anemia in ESRD  Acute on chronic HFpEF   Problem with vascular access  - HDS on admission, stable on RA  - Labs remarkable for mild hyperkalemia 5.8, phos 7.7  - Nephrology consulted for maintenance HD  - AVG to RUE without bruit or thrill; vascular surg consulted, appreciate assistance  - Will give kayexalate x1 > repeat K 5.3  -  increase renvela in the mean time from 800mg TIDWM to 1600TIDWM (12/5)  12/6: K+ 5.9>6.7; shifted; repeat K+ 4.1  - fistulogram today per vasc surg: pt successfully declotted  - pt underwent HD  12/7: HD again today  -  drop in hgb 8.2>7.6, suspect 2/2 to reported bleeding overnight; discussed with nephro will check iron/give epo; watch for bleeding during PM HD session; T&S/C  - Renal/DM diet when eating  - Strict I/Os, daily weights    *CM/SW to fax additional paperwork to [employer]; completed, pt is set up for HD outpt at [facility]

## 2019-12-08 LAB
ALBUMIN SERPL BCP-MCNC: 3.3 G/DL (ref 3.5–5.2)
ANION GAP SERPL CALC-SCNC: 11 MMOL/L (ref 8–16)
BASOPHILS # BLD AUTO: 0.04 K/UL (ref 0–0.2)
BASOPHILS NFR BLD: 0.7 % (ref 0–1.9)
BLD PROD TYP BPU: NORMAL
BLOOD UNIT EXPIRATION DATE: NORMAL
BLOOD UNIT TYPE CODE: 5100
BLOOD UNIT TYPE: NORMAL
BUN SERPL-MCNC: 30 MG/DL (ref 8–23)
CALCIUM SERPL-MCNC: 8.9 MG/DL (ref 8.7–10.5)
CHLORIDE SERPL-SCNC: 99 MMOL/L (ref 95–110)
CO2 SERPL-SCNC: 28 MMOL/L (ref 23–29)
CODING SYSTEM: NORMAL
CREAT SERPL-MCNC: 5.5 MG/DL (ref 0.5–1.4)
DIFFERENTIAL METHOD: ABNORMAL
DISPENSE STATUS: NORMAL
EOSINOPHIL # BLD AUTO: 0.2 K/UL (ref 0–0.5)
EOSINOPHIL NFR BLD: 3.1 % (ref 0–8)
ERYTHROCYTE [DISTWIDTH] IN BLOOD BY AUTOMATED COUNT: 12.8 % (ref 11.5–14.5)
EST. GFR  (AFRICAN AMERICAN): 8.9 ML/MIN/1.73 M^2
EST. GFR  (NON AFRICAN AMERICAN): 7.8 ML/MIN/1.73 M^2
GLUCOSE SERPL-MCNC: 198 MG/DL (ref 70–110)
HCT VFR BLD AUTO: 22.5 % (ref 37–48.5)
HGB BLD-MCNC: 6.7 G/DL (ref 12–16)
IMM GRANULOCYTES # BLD AUTO: 0.04 K/UL (ref 0–0.04)
IMM GRANULOCYTES NFR BLD AUTO: 0.7 % (ref 0–0.5)
LYMPHOCYTES # BLD AUTO: 1.3 K/UL (ref 1–4.8)
LYMPHOCYTES NFR BLD: 22.1 % (ref 18–48)
MCH RBC QN AUTO: 32.2 PG (ref 27–31)
MCHC RBC AUTO-ENTMCNC: 29.8 G/DL (ref 32–36)
MCV RBC AUTO: 108 FL (ref 82–98)
MONOCYTES # BLD AUTO: 0.7 K/UL (ref 0.3–1)
MONOCYTES NFR BLD: 11 % (ref 4–15)
NEUTROPHILS # BLD AUTO: 3.8 K/UL (ref 1.8–7.7)
NEUTROPHILS NFR BLD: 62.4 % (ref 38–73)
NRBC BLD-RTO: 0 /100 WBC
PHOSPHATE SERPL-MCNC: 4.8 MG/DL (ref 2.7–4.5)
PLATELET # BLD AUTO: 175 K/UL (ref 150–350)
PMV BLD AUTO: 10.3 FL (ref 9.2–12.9)
POCT GLUCOSE: 169 MG/DL (ref 70–110)
POCT GLUCOSE: 170 MG/DL (ref 70–110)
POCT GLUCOSE: 183 MG/DL (ref 70–110)
POCT GLUCOSE: 198 MG/DL (ref 70–110)
POTASSIUM SERPL-SCNC: 3.5 MMOL/L (ref 3.5–5.1)
POTASSIUM SERPL-SCNC: 3.8 MMOL/L (ref 3.5–5.1)
RBC # BLD AUTO: 2.08 M/UL (ref 4–5.4)
SODIUM SERPL-SCNC: 138 MMOL/L (ref 136–145)
TRANS ERYTHROCYTES VOL PATIENT: NORMAL ML
WBC # BLD AUTO: 6.07 K/UL (ref 3.9–12.7)

## 2019-12-08 PROCEDURE — P9021 RED BLOOD CELLS UNIT: HCPCS | Mod: HCNC,NTX

## 2019-12-08 PROCEDURE — 80069 RENAL FUNCTION PANEL: CPT | Mod: HCNC,NTX

## 2019-12-08 PROCEDURE — 25000003 PHARM REV CODE 250: Mod: HCNC,NTX | Performed by: PHYSICIAN ASSISTANT

## 2019-12-08 PROCEDURE — 99233 SBSQ HOSP IP/OBS HIGH 50: CPT | Mod: HCNC,NTX,, | Performed by: PHYSICIAN ASSISTANT

## 2019-12-08 PROCEDURE — 84132 ASSAY OF SERUM POTASSIUM: CPT | Mod: 91,HCNC,NTX

## 2019-12-08 PROCEDURE — A4216 STERILE WATER/SALINE, 10 ML: HCPCS | Mod: HCNC,NTX | Performed by: PHYSICIAN ASSISTANT

## 2019-12-08 PROCEDURE — 36415 COLL VENOUS BLD VENIPUNCTURE: CPT | Mod: HCNC,NTX

## 2019-12-08 PROCEDURE — 63600175 PHARM REV CODE 636 W HCPCS: Mod: HCNC,NTX | Performed by: ANESTHESIOLOGY

## 2019-12-08 PROCEDURE — 85025 COMPLETE CBC W/AUTO DIFF WBC: CPT | Mod: HCNC,NTX

## 2019-12-08 PROCEDURE — 63600175 PHARM REV CODE 636 W HCPCS: Mod: HCNC,NTX | Performed by: PHYSICIAN ASSISTANT

## 2019-12-08 PROCEDURE — 99233 PR SUBSEQUENT HOSPITAL CARE,LEVL III: ICD-10-PCS | Mod: HCNC,NTX,, | Performed by: PHYSICIAN ASSISTANT

## 2019-12-08 PROCEDURE — 84132 ASSAY OF SERUM POTASSIUM: CPT | Mod: HCNC,NTX

## 2019-12-08 PROCEDURE — 11000001 HC ACUTE MED/SURG PRIVATE ROOM: Mod: HCNC,NTX

## 2019-12-08 PROCEDURE — 94761 N-INVAS EAR/PLS OXIMETRY MLT: CPT | Mod: HCNC,NTX

## 2019-12-08 RX ORDER — HYDROXYZINE HYDROCHLORIDE 25 MG/1
25 TABLET, FILM COATED ORAL 3 TIMES DAILY PRN
Status: DISCONTINUED | OUTPATIENT
Start: 2019-12-08 | End: 2019-12-09 | Stop reason: HOSPADM

## 2019-12-08 RX ORDER — HYDROCODONE BITARTRATE AND ACETAMINOPHEN 500; 5 MG/1; MG/1
TABLET ORAL
Status: DISCONTINUED | OUTPATIENT
Start: 2019-12-08 | End: 2019-12-09 | Stop reason: HOSPADM

## 2019-12-08 RX ADMIN — SEVELAMER CARBONATE 1600 MG: 800 TABLET, FILM COATED ORAL at 12:12

## 2019-12-08 RX ADMIN — HEPARIN SODIUM 5000 UNITS: 5000 INJECTION, SOLUTION INTRAVENOUS; SUBCUTANEOUS at 03:12

## 2019-12-08 RX ADMIN — ATORVASTATIN CALCIUM 40 MG: 20 TABLET, FILM COATED ORAL at 08:12

## 2019-12-08 RX ADMIN — LIDOCAINE 1 PATCH: 50 PATCH CUTANEOUS at 12:12

## 2019-12-08 RX ADMIN — INSULIN ASPART 4 UNITS: 100 INJECTION, SOLUTION INTRAVENOUS; SUBCUTANEOUS at 08:12

## 2019-12-08 RX ADMIN — Medication 3 ML: at 06:12

## 2019-12-08 RX ADMIN — INSULIN ASPART 4 UNITS: 100 INJECTION, SOLUTION INTRAVENOUS; SUBCUTANEOUS at 12:12

## 2019-12-08 RX ADMIN — ASPIRIN 81 MG: 81 TABLET, COATED ORAL at 08:12

## 2019-12-08 RX ADMIN — SEVELAMER CARBONATE 1600 MG: 800 TABLET, FILM COATED ORAL at 05:12

## 2019-12-08 RX ADMIN — Medication 3 ML: at 09:12

## 2019-12-08 RX ADMIN — FOLIC ACID 1 MG: 1 TABLET ORAL at 08:12

## 2019-12-08 RX ADMIN — SEVELAMER CARBONATE 1600 MG: 800 TABLET, FILM COATED ORAL at 08:12

## 2019-12-08 RX ADMIN — HEPARIN SODIUM 5000 UNITS: 5000 INJECTION, SOLUTION INTRAVENOUS; SUBCUTANEOUS at 05:12

## 2019-12-08 RX ADMIN — DIPHENHYDRAMINE HYDROCHLORIDE 25 MG: 50 INJECTION, SOLUTION INTRAMUSCULAR; INTRAVENOUS at 11:12

## 2019-12-08 RX ADMIN — Medication 3 ML: at 03:12

## 2019-12-08 RX ADMIN — HEPARIN SODIUM 5000 UNITS: 5000 INJECTION, SOLUTION INTRAVENOUS; SUBCUTANEOUS at 09:12

## 2019-12-08 RX ADMIN — HYDROXYZINE HYDROCHLORIDE 25 MG: 25 TABLET, FILM COATED ORAL at 01:12

## 2019-12-08 RX ADMIN — LEVETIRACETAM 1000 MG: 500 TABLET ORAL at 08:12

## 2019-12-08 RX ADMIN — INSULIN ASPART 4 UNITS: 100 INJECTION, SOLUTION INTRAVENOUS; SUBCUTANEOUS at 05:12

## 2019-12-08 NOTE — ASSESSMENT & PLAN NOTE
ESRD TTHS (last HD 11/30)  Anemia in ESRD  Acute on chronic HFpEF   Problem with vascular access  - HDS on admission, stable on RA  - Labs remarkable for mild hyperkalemia 5.8, phos 7.7  - Nephrology consulted for maintenance HD  - AVG to RUE without bruit or thrill; vascular surg consulted, appreciate assistance  - Will give kayexalate x1 > repeat K 5.3  -  increase renvela in the mean time from 800mg TIDWM to 1600TIDWM (12/5)  12/6: K+ 5.9>6.7; shifted; repeat K+ 4.1  - fistulogram today per vasc surg: pt successfully declotted  - pt underwent HD  12/7: HD again today  -  drop in hgb 8.2>7.6, suspect 2/2 to reported bleeding overnight; discussed with nephro will check iron/give epo; watch for bleeding during PM HD session; T&S/C  12/8: hgb 6.7; will transfuse 1 unit and monitor through tomorrow  - Renal/DM diet when eating  - Strict I/Os, daily weights    *CM/SW to fax additional paperwork to [employer]; completed, pt is set up for HD outpt at [facility]

## 2019-12-08 NOTE — NURSING
request bedside commode stated wheelchair gives him trouble getting in bathroom,bedside commode place at bedside,on going monitoring.

## 2019-12-08 NOTE — SUBJECTIVE & OBJECTIVE
Interval History: Pt tolerated HD well without bleeding yesterday PM. Hgb 6.7 this AM, transfuse 1 unit and monitor through tomorrow.     Review of Systems   Constitutional: Negative for fatigue and fever.   Respiratory: Negative for cough and shortness of breath.    Cardiovascular: Negative for chest pain, palpitations and leg swelling.   Gastrointestinal: Negative for abdominal pain, diarrhea, nausea and vomiting.   Genitourinary: Positive for decreased urine volume (ESRD). Negative for hematuria.   Musculoskeletal: Positive for back pain (chronic) and gait problem (wheelchair bound since CVA).   Skin: Negative for rash and wound.   Neurological: Positive for weakness (chronic L sided weakness s/p CVA). Negative for syncope and headaches.   Psychiatric/Behavioral: Negative for agitation. The patient is not nervous/anxious.      Objective:     Vital Signs (Most Recent):  Temp: 98.1 °F (36.7 °C) (12/08/19 1115)  Pulse: 79 (12/08/19 1143)  Resp: 16 (12/08/19 1115)  BP: (!) 93/48 (12/08/19 1115)  SpO2: 97 % (12/08/19 1151) Vital Signs (24h Range):  Temp:  [97.7 °F (36.5 °C)-99.5 °F (37.5 °C)] 98.1 °F (36.7 °C)  Pulse:  [] 79  Resp:  [16-20] 16  SpO2:  [95 %-97 %] 97 %  BP: ()/(46-71) 93/48     Weight: 91.2 kg (201 lb 1 oz)  Body mass index is 35.62 kg/m².    Intake/Output Summary (Last 24 hours) at 12/8/2019 1256  Last data filed at 12/7/2019 2140  Gross per 24 hour   Intake 1320 ml   Output 2617 ml   Net -1297 ml      Physical Exam   Constitutional: She is oriented to person, place, and time. She appears well-developed and well-nourished. No distress.   HENT:   Head: Normocephalic and atraumatic.   Eyes: Right eye exhibits no discharge. Left eye exhibits no discharge.   Neck: Normal range of motion. Neck supple.   Cardiovascular: Normal rate, regular rhythm and normal heart sounds.   Palpable thrill RUE AVG   Pulmonary/Chest: Effort normal. No respiratory distress.   Abdominal: She exhibits no distension.  There is no tenderness.   Musculoskeletal: She exhibits no edema or tenderness.   Neurological: She is alert and oriented to person, place, and time.   Chronic L sided hemiparesis   Skin: Skin is warm and dry. She is not diaphoretic.   Psychiatric: She has a normal mood and affect.       Significant Labs: All pertinent labs within the past 24 hours have been reviewed.    Significant Imaging: I have reviewed all pertinent imaging results/findings within the past 24 hours.

## 2019-12-08 NOTE — PROGRESS NOTES
[facility] Hospital Medicine  Progress Note    Patient Name: Lucy Perez  MRN: [id number]  Patient Class: IP- Inpatient   Admission Date: 12/4/2019  Length of Stay: 3 days  Attending Physician: Angelo Veronica MD  Primary Care Provider: Beverly Muniz MD    [facility] Medicine Team: [facility] HOSP MED E Tisha Aguilera PA-C    Subjective:     Principal Problem:Hyperkalemia        HPI:  59 year old female with a PMHx of DM2, ESRD TTHS, CVA with residual L sided hemiparesis, seizures, and depression presenting to the ER with  at bedside for dialysis. Patient's last HD session was Saturday 11/30. She has been transitioning from [facility] to [facility] for outpatient HD. Unfortunately, [facility] has not received all the paperwork needed for her to transition dialysis there. She presents to the ER today to have dialysis. At the time of my exam, patient is resting comfortably in bed with  at bedside. She has no complaints. Denies CP, SOB, abdominal pain, N/V/D, fever/chills, dysuria, palpitations, syncope, headache, paresthesias, and new weakness.      HDS on admission, afebrile without leukocytosis. Labs remarkable for chronic anemia, K 5.8, , and phos 7.7.     Overview/Hospital Course:  Patient admitted for hyperkalemia in setting of ESRD on HD. Unfortunately patient is in the process of switching HD centers and she was unable to be dialyzed outpt. K 5.8 on admission, improved to 5.3 with kayexalate/calcium gluconate. Patient's AVG found to be clotted, vascular surgery consulted. Appreciate assistance, pt declotted with Vasc Surg 12/6. Nephrology following as well. Pt had bleeding with HD after declot, hgb 7.6 12/7. Discussed with nephrology- check iron panels, will give EPO in HD. T/S&C as precaution. No recurrent bleeding on HD. 12/8 hgb 6.7, transfuse 1 unit and monitor.    Interval History: Pt tolerated HD well without bleeding yesterday PM. Hgb 6.7 this AM, transfuse 1  unit and monitor through tomorrow.     Review of Systems   Constitutional: Negative for fatigue and fever.   Respiratory: Negative for cough and shortness of breath.    Cardiovascular: Negative for chest pain, palpitations and leg swelling.   Gastrointestinal: Negative for abdominal pain, diarrhea, nausea and vomiting.   Genitourinary: Positive for decreased urine volume (ESRD). Negative for hematuria.   Musculoskeletal: Positive for back pain (chronic) and gait problem (wheelchair bound since CVA).   Skin: Negative for rash and wound.   Neurological: Positive for weakness (chronic L sided weakness s/p CVA). Negative for syncope and headaches.   Psychiatric/Behavioral: Negative for agitation. The patient is not nervous/anxious.      Objective:     Vital Signs (Most Recent):  Temp: 98.1 °F (36.7 °C) (12/08/19 1115)  Pulse: 79 (12/08/19 1143)  Resp: 16 (12/08/19 1115)  BP: (!) 93/48 (12/08/19 1115)  SpO2: 97 % (12/08/19 1151) Vital Signs (24h Range):  Temp:  [97.7 °F (36.5 °C)-99.5 °F (37.5 °C)] 98.1 °F (36.7 °C)  Pulse:  [] 79  Resp:  [16-20] 16  SpO2:  [95 %-97 %] 97 %  BP: ()/(46-71) 93/48     Weight: 91.2 kg (201 lb 1 oz)  Body mass index is 35.62 kg/m².    Intake/Output Summary (Last 24 hours) at 12/8/2019 1256  Last data filed at 12/7/2019 2140  Gross per 24 hour   Intake 1320 ml   Output 2617 ml   Net -1297 ml      Physical Exam   Constitutional: She is oriented to person, place, and time. She appears well-developed and well-nourished. No distress.   HENT:   Head: Normocephalic and atraumatic.   Eyes: Right eye exhibits no discharge. Left eye exhibits no discharge.   Neck: Normal range of motion. Neck supple.   Cardiovascular: Normal rate, regular rhythm and normal heart sounds.   Palpable thrill RUE AVG   Pulmonary/Chest: Effort normal. No respiratory distress.   Abdominal: She exhibits no distension. There is no tenderness.   Musculoskeletal: She exhibits no edema or tenderness.   Neurological:  She is alert and oriented to person, place, and time.   Chronic L sided hemiparesis   Skin: Skin is warm and dry. She is not diaphoretic.   Psychiatric: She has a normal mood and affect.       Significant Labs: All pertinent labs within the past 24 hours have been reviewed.    Significant Imaging: I have reviewed all pertinent imaging results/findings within the past 24 hours.      Assessment/Plan:      * Hyperkalemia  ESRD TTHS (last HD 11/30)  Anemia in ESRD  Acute on chronic HFpEF   Problem with vascular access  - HDS on admission, stable on RA  - Labs remarkable for mild hyperkalemia 5.8, phos 7.7  - Nephrology consulted for maintenance HD  - AVG to RUE without bruit or thrill; vascular surg consulted, appreciate assistance  - Will give kayexalate x1 > repeat K 5.3  -  increase renvela in the mean time from 800mg TIDWM to 1600TIDWM (12/5)  12/6: K+ 5.9>6.7; shifted; repeat K+ 4.1  - fistulogram today per vasc surg: pt successfully declotted  - pt underwent HD  12/7: HD again today  -  drop in hgb 8.2>7.6, suspect 2/2 to reported bleeding overnight; discussed with nephro will check iron/give epo; watch for bleeding during PM HD session; T&S/C  12/8: hgb 6.7; will transfuse 1 unit and monitor through tomorrow  - Renal/DM diet when eating  - Strict I/Os, daily weights    *CM/SW to fax additional paperwork to [facility]; completed, pt is set up for HD outpt at [facility]     Type 2 diabetes mellitus with chronic kidney disease on chronic dialysis, with long-term current use of insulin  - Last A1c 6.8 in 8/2019; repeat 7.8 today  -  on admit; patient endorses not taking home insulin  - 13U detemir qdaily, aspart 4U TID WM, low dose SSI  - Renal/DM diet  - Monitor BGs and adjust insulin PRN  12/5-12/6: BG at goal, monitor for hypoglycemia  12/7: BG 400s, increase prandial and add sliding scale    History of stroke  L sided hemiparesis  HLD  Wheelchair bound  - Continue ASA and lipitor    Partial symptomatic  epilepsy with complex partial seizures, not intractable, without status epilepticus  - No recent seizure activity  - Patient reports taking keppra 500 mg BID on HD days and 100 mg daily on other days  - Will continue    Mixed anxiety and depressive disorder  - Chronic and stable  - Patient and  are unsure if taking celexa or prozac at home; daughter to confirm and notify      VTE Risk Mitigation (From admission, onward)         Ordered     heparin (porcine) injection 5,000 Units  Every 8 hours      12/04/19 1501     Place sequential compression device  Until discontinued      12/04/19 1501     Place LUIS hose  Until discontinued      12/04/19 1501     IP VTE HIGH RISK PATIENT  Once      12/04/19 1501                    Discussed with joe Aguilera PA-C  Department of Hospital Medicine   [facility]

## 2019-12-08 NOTE — NURSING
Pt back from HD, VSS, no acute events this shift. Pt Remains free of falls.  at bedside. POCT checks in place. Currently eating dinner. Bed low and locked and call light in reach.

## 2019-12-08 NOTE — PLAN OF CARE
Bed in low position and locked,call light in reach,pt turned and reposition by staff and at times by .pt is known for asking to be reposition as she states I do get tired cause I try doing it myself and find myself right back on my back,wedge and pillows use for positioning.

## 2019-12-09 ENCOUNTER — TELEPHONE (OUTPATIENT)
Dept: VASCULAR SURGERY | Facility: CLINIC | Age: 59
End: 2019-12-09

## 2019-12-09 VITALS
HEIGHT: 63 IN | RESPIRATION RATE: 20 BRPM | HEART RATE: 86 BPM | DIASTOLIC BLOOD PRESSURE: 51 MMHG | WEIGHT: 195.56 LBS | BODY MASS INDEX: 34.65 KG/M2 | SYSTOLIC BLOOD PRESSURE: 104 MMHG | OXYGEN SATURATION: 96 % | TEMPERATURE: 98 F

## 2019-12-09 LAB
ALBUMIN SERPL BCP-MCNC: 2.9 G/DL (ref 3.5–5.2)
ANION GAP SERPL CALC-SCNC: 14 MMOL/L (ref 8–16)
BASOPHILS # BLD AUTO: 0.02 K/UL (ref 0–0.2)
BASOPHILS NFR BLD: 0.4 % (ref 0–1.9)
BUN SERPL-MCNC: 51 MG/DL (ref 8–23)
CALCIUM SERPL-MCNC: 8.3 MG/DL (ref 8.7–10.5)
CHLORIDE SERPL-SCNC: 102 MMOL/L (ref 95–110)
CO2 SERPL-SCNC: 25 MMOL/L (ref 23–29)
CREAT SERPL-MCNC: 7.7 MG/DL (ref 0.5–1.4)
DIFFERENTIAL METHOD: ABNORMAL
EOSINOPHIL # BLD AUTO: 0.2 K/UL (ref 0–0.5)
EOSINOPHIL NFR BLD: 3 % (ref 0–8)
ERYTHROCYTE [DISTWIDTH] IN BLOOD BY AUTOMATED COUNT: 15.9 % (ref 11.5–14.5)
EST. GFR  (AFRICAN AMERICAN): 6 ML/MIN/1.73 M^2
EST. GFR  (NON AFRICAN AMERICAN): 5.2 ML/MIN/1.73 M^2
GLUCOSE SERPL-MCNC: 158 MG/DL (ref 70–110)
HCT VFR BLD AUTO: 24.1 % (ref 37–48.5)
HGB BLD-MCNC: 7.4 G/DL (ref 12–16)
IMM GRANULOCYTES # BLD AUTO: 0.02 K/UL (ref 0–0.04)
IMM GRANULOCYTES NFR BLD AUTO: 0.4 % (ref 0–0.5)
LYMPHOCYTES # BLD AUTO: 1.4 K/UL (ref 1–4.8)
LYMPHOCYTES NFR BLD: 26.8 % (ref 18–48)
MCH RBC QN AUTO: 31.4 PG (ref 27–31)
MCHC RBC AUTO-ENTMCNC: 30.7 G/DL (ref 32–36)
MCV RBC AUTO: 102 FL (ref 82–98)
MONOCYTES # BLD AUTO: 0.6 K/UL (ref 0.3–1)
MONOCYTES NFR BLD: 10.8 % (ref 4–15)
NEUTROPHILS # BLD AUTO: 3.1 K/UL (ref 1.8–7.7)
NEUTROPHILS NFR BLD: 58.6 % (ref 38–73)
NRBC BLD-RTO: 0 /100 WBC
PHOSPHATE SERPL-MCNC: 6.1 MG/DL (ref 2.7–4.5)
PLATELET # BLD AUTO: 145 K/UL (ref 150–350)
PMV BLD AUTO: 10.3 FL (ref 9.2–12.9)
POTASSIUM SERPL-SCNC: 3.6 MMOL/L (ref 3.5–5.1)
RBC # BLD AUTO: 2.36 M/UL (ref 4–5.4)
SODIUM SERPL-SCNC: 141 MMOL/L (ref 136–145)
WBC # BLD AUTO: 5.27 K/UL (ref 3.9–12.7)

## 2019-12-09 PROCEDURE — 25000003 PHARM REV CODE 250: Mod: HCNC,NTX | Performed by: PHYSICIAN ASSISTANT

## 2019-12-09 PROCEDURE — 36415 COLL VENOUS BLD VENIPUNCTURE: CPT | Mod: HCNC,NTX

## 2019-12-09 PROCEDURE — 99239 HOSP IP/OBS DSCHRG MGMT >30: CPT | Mod: HCNC,NTX,, | Performed by: PHYSICIAN ASSISTANT

## 2019-12-09 PROCEDURE — 99239 PR HOSPITAL DISCHARGE DAY,>30 MIN: ICD-10-PCS | Mod: HCNC,NTX,, | Performed by: PHYSICIAN ASSISTANT

## 2019-12-09 PROCEDURE — 63600175 PHARM REV CODE 636 W HCPCS: Mod: HCNC,NTX | Performed by: PHYSICIAN ASSISTANT

## 2019-12-09 PROCEDURE — 80069 RENAL FUNCTION PANEL: CPT | Mod: HCNC,NTX

## 2019-12-09 PROCEDURE — 85025 COMPLETE CBC W/AUTO DIFF WBC: CPT | Mod: HCNC,NTX

## 2019-12-09 RX ORDER — LIDOCAINE 50 MG/G
1 PATCH TOPICAL DAILY
Qty: 10 PATCH | Refills: 1 | Status: SHIPPED | OUTPATIENT
Start: 2019-12-09 | End: 2020-08-28

## 2019-12-09 RX ADMIN — LIDOCAINE 1 PATCH: 50 PATCH CUTANEOUS at 10:12

## 2019-12-09 RX ADMIN — INSULIN ASPART 3 UNITS: 100 INJECTION, SOLUTION INTRAVENOUS; SUBCUTANEOUS at 09:12

## 2019-12-09 RX ADMIN — SEVELAMER CARBONATE 1600 MG: 800 TABLET, FILM COATED ORAL at 09:12

## 2019-12-09 RX ADMIN — ATORVASTATIN CALCIUM 40 MG: 20 TABLET, FILM COATED ORAL at 10:12

## 2019-12-09 RX ADMIN — FOLIC ACID 1 MG: 1 TABLET ORAL at 09:12

## 2019-12-09 RX ADMIN — HEPARIN SODIUM 5000 UNITS: 5000 INJECTION, SOLUTION INTRAVENOUS; SUBCUTANEOUS at 06:12

## 2019-12-09 RX ADMIN — INSULIN ASPART 4 UNITS: 100 INJECTION, SOLUTION INTRAVENOUS; SUBCUTANEOUS at 09:12

## 2019-12-09 RX ADMIN — ASPIRIN 81 MG: 81 TABLET, COATED ORAL at 10:12

## 2019-12-09 RX ADMIN — LIDOCAINE 1 PATCH: 50 PATCH CUTANEOUS at 09:12

## 2019-12-09 RX ADMIN — LEVETIRACETAM 1000 MG: 500 TABLET ORAL at 09:12

## 2019-12-09 NOTE — PLAN OF CARE
Patient sitting up on side of bed. Family at bed side. Patient was free of falls during shift. Patient was medicated per orders through shift. Patient voices no needs or concerns at this time. Bed in lowest position. Side rails up x2. Call light in reach.

## 2019-12-09 NOTE — ASSESSMENT & PLAN NOTE
ESRD TTHS (last HD 11/30)  Anemia in ESRD  Acute on chronic HFpEF   Problem with vascular access  - HDS on admission, stable on RA  - Labs remarkable for mild hyperkalemia 5.8, phos 7.7  - Nephrology consulted for maintenance HD  - AVG to RUE without bruit or thrill; vascular surg consulted, appreciate assistance  - Will give kayexalate x1 > repeat K 5.3  -  increase renvela in the mean time from 800mg TIDWM to 1600TIDWM (12/5)  12/6: K+ 5.9>6.7; shifted; repeat K+ 4.1  - fistulogram today per vasc surg: pt successfully declotted  - pt underwent HD  12/7: HD again today  -  drop in hgb 8.2>7.6, suspect 2/2 to reported bleeding overnight; discussed with nephro will check iron/give epo; watch for bleeding during PM HD session; T&S/C  12/8: hgb 6.7; will transfuse 1 unit and monitor through tomorrow  12/9: hgb stable 7.6; K+ WNL  CM/SW confirmed pt is able to dialyze outpt at [facility] prior to d/c  Stable for d/c with PCP follow up, return precautions discussed; pt voiced understanding

## 2019-12-09 NOTE — PLAN OF CARE
Problem: Fall Injury Risk  Goal: Absence of Fall and Fall-Related Injury  Outcome: Ongoing, Progressing  Patient was sitting in bedside chair, Required Max assist back to bed. No falls or injuries.     Problem: Hemodynamic Instability (Hemodialysis)  Goal: Vital Signs Remain in Desired Range  Outcome: Ongoing, Progressing  Patient systolic blood pressure remains in the 90's will continue to monitor. One unit of PRBC's administered yesterday.

## 2019-12-09 NOTE — PROGRESS NOTES
Patient using the permcath for dialysis without any issues.  Her Hemoglobin was 6.7 yesterday and she was transfused 1U blood and kept in the hospital for monitoring.  Will plan to follow along peripherally at this time.    Corwin Villasenor, PGY 1  Vascular Surgery  Pager: [phone]

## 2019-12-09 NOTE — DISCHARGE SUMMARY
[facility] Hospital Medicine  Discharge Summary      Patient Name: Lucy Perez  MRN: [id number]  Admission Date: 12/4/2019  Hospital Length of Stay: 4 days  Discharge Date and Time: 12/9/2019 12:26 PM  Attending Physician: No att. providers found   Discharging Provider: Tisha Aguilera PA-C  Primary Care Provider: Beverly Muniz MD  Hospital Medicine Team: [facility] HOSP MED E Tisha Aguilera PA-C    HPI:   59 year old female with a PMHx of DM2, ESRD TTHS, CVA with residual L sided hemiparesis, seizures, and depression presenting to the ER with  at bedside for dialysis. Patient's last HD session was Saturday 11/30. She has been transitioning from [facility] to [facility] for outpatient HD. Unfortunately, [facility] has not received all the paperwork needed for her to transition dialysis there. She presents to the ER today to have dialysis. At the time of my exam, patient is resting comfortably in bed with  at bedside. She has no complaints. Denies CP, SOB, abdominal pain, N/V/D, fever/chills, dysuria, palpitations, syncope, headache, paresthesias, and new weakness.      HDS on admission, afebrile without leukocytosis. Labs remarkable for chronic anemia, K 5.8, , and phos 7.7.     Procedure(s) (LRB):  Fistulogram (Right)  DECLOT-GRAFT (Right)      Hospital Course:   Patient admitted for hyperkalemia in setting of ESRD on HD. Unfortunately patient is in the process of switching HD centers and she was unable to be dialyzed outpt. K 5.8 on admission, improved to 5.3 with kayexalate/calcium gluconate. Patient's AVG found to be clotted, vascular surgery consulted. Appreciate assistance, pt declotted with Vasc Surg 12/6. Nephrology following as well. Pt had bleeding with HD after declot, hgb 7.6 12/7. Discussed with nephrology- will give EPO in HD. T/S&C as precaution. No recurrent bleeding on HD. 12/8 hgb 6.7, transfuse 1 unit and monitor. Repeat hgb 7.4. Pt is stable for d/c  with return precautions discussed. CM/SW confirmed [facility]. No further questions at d/c.     Consults:   Consults (From admission, onward)        Status Ordering Provider     Inpatient consult to Nephrology  Once     Provider:  (Not yet assigned)    Completed STAN SONI     Inpatient consult to PICC team (\[facility]\"")  Once     Provider:  (Not yet assigned)    Completed DEMARCO PARADA     Inpatient consult to Vascular Surgery  Once     Provider:  (Not yet assigned)    Completed STAN SONI          * Hyperkalemia  ESRD TTHS (last HD 11/30)  Anemia in ESRD  Acute on chronic HFpEF   Problem with vascular access  - HDS on admission, stable on RA  - Labs remarkable for mild hyperkalemia 5.8, phos 7.7  - Nephrology consulted for maintenance HD  - AVG to RUE without bruit or thrill; vascular surg consulted, appreciate assistance  - Will give kayexalate x1 > repeat K 5.3  -  increase renvela in the mean time from 800mg TIDWM to 1600TIDWM (12/5)  12/6: K+ 5.9>6.7; shifted; repeat K+ 4.1  - fistulogram today per vasc surg: pt successfully declotted  - pt underwent HD  12/7: HD again today  -  drop in hgb 8.2>7.6, suspect 2/2 to reported bleeding overnight; discussed with nephro will check iron/give epo; watch for bleeding during PM HD session; T&S/C  12/8: hgb 6.7; will transfuse 1 unit and monitor through tomorrow  12/9: hgb stable 7.6; K+ WNL  CM/SW confirmed pt is able to dialyze outpt at [facility] prior to d/c  Stable for d/c with PCP follow up, return precautions discussed; pt voiced understanding    Type 2 diabetes mellitus with chronic kidney disease on chronic dialysis, with long-term current use of insulin  - Last A1c 6.8 in 8/2019; repeat 7.8 today  -  on admit; patient endorses not taking home insulin  - 13U detemir qdaily, aspart 4U TID WM, low dose SSI  - Renal/DM diet  - Monitor BGs and adjust insulin PRN  12/5-12/6: BG at goal, monitor for hypoglycemia  12/7: BG  400s, increase prandial and add sliding scale  BG improved at d/c    History of stroke  L sided hemiparesis  HLD  Wheelchair bound  - Continue ASA and lipitor/ outpt therapy    Partial symptomatic epilepsy with complex partial seizures, not intractable, without status epilepticus  - No recent seizure activity  - Patient reports taking keppra 500 mg BID on HD days and 100 mg daily on other days  - Will continue    Mixed anxiety and depressive disorder  - Chronic and stable  - Patient and  are unsure if taking celexa or prozac at home; daughter to confirm and notify      Final Active Diagnoses:    Diagnosis Date Noted POA    PRINCIPAL PROBLEM:  Hyperkalemia [E87.5] 12/04/2019 Yes    Type 2 diabetes mellitus with chronic kidney disease on chronic dialysis, with long-term current use of insulin [E11.22, N18.6, Z99.2, Z79.4] 08/10/2018 Not Applicable    History of stroke [Z86.73] 11/07/2019 Not Applicable    Partial symptomatic epilepsy with complex partial seizures, not intractable, without status epilepticus [G40.209] 06/06/2017 Yes    Mixed anxiety and depressive disorder [F41.8] 03/05/2013 Yes    AV graft thrombosis, 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]  Yes    ESRD (end stage renal disease) on dialysis [N18.6, Z99.2]  Not Applicable    Problem with vascular access [Z78.9] 12/05/2019 Yes    ESRD (end stage renal disease) [N18.6] 08/11/2018 Yes    Anemia in ESRD (end-stage renal disease) [N18.6, D63.1] 08/02/2017 Yes    Left hemiparesis [G81.94] 10/22/2016 Yes    Acute on chronic diastolic (congestive) heart failure [I50.33] 12/15/2015 Yes    Hyperlipidemia [E78.5] 03/05/2013 Yes      Problems Resolved During this Admission:       Discharged Condition: stable    Disposition: Home or Self Care    Follow Up:  Follow-up Information     MARISELA Alejandra On 12/18/2019.    Specialty:  Internal Medicine  Why:  at 1:30pm; hospital follow up appointment  Contact information:  [id number]  "[place] [address]  [phone]                 Patient Instructions:      Notify your health care provider if you experience any of the following:  temperature >100.4     Notify your health care provider if you experience any of the following:  severe uncontrolled pain     Notify your health care provider if you experience any of the following:  redness, tenderness, or signs of infection (pain, swelling, redness, odor or green/yellow discharge around incision site)     Notify your health care provider if you experience any of the following:  worsening rash     Notify your health care provider if you experience any of the following:  increased confusion or weakness     Activity as tolerated       Significant Diagnostic Studies: hepatitis panel -; Hgb 7.4 and K+ 3.6 at d/c     Pending Diagnostic Studies:     None         Medications:  Reconciled Home Medications:      Medication List      START taking these medications    lidocaine 5 %  Commonly known as:  LIDODERM  Place 1 patch onto the skin once daily. Remove & Discard patch within 12 hours or as directed by MD        CHANGE how you take these medications    pregabalin 25 MG capsule  Commonly known as:  LYRICA  Take 1 capsule (25 mg total) by mouth once daily. May take additional dose after HD.  What changed:    · when to take this  · reasons to take this        CONTINUE taking these medications    acetaminophen 325 MG tablet  Commonly known as:  TYLENOL  Take 2 tablets (650 mg total) by mouth every 6 (six) hours as needed for Pain.     albuterol 90 mcg/actuation inhaler  Commonly known as:  Ventolin HFA  Inhale 2 puffs into the lungs every 6 (six) hours as needed for Wheezing. Rescue     aspirin 81 MG EC tablet  Commonly known as:  ECOTRIN  Take 81 mg by mouth every morning.     atorvastatin 40 MG tablet  Commonly known as:  LIPITOR  TAKE 1 TABLET ONE TIME DAILY FOR CHOLESTEROL     BD Ultra-Fine Short Pen Needle 31 gauge x 5/16" Ndle  Generic drug:  pen needle, " diabetic  USE TO INJECT NOVOLOG FLEXPEN BEFORE MEALS     bisacodyl 10 mg Supp  Commonly known as:  DULCOLAX  Place 1 suppository (10 mg total) rectally daily as needed.     blood sugar diagnostic Strp  To check BG 4  times daily, to use with insurance preferred meter-true metrix     blood-glucose meter kit  To check BG 4 times daily, to use with insurance preferred meter-true metrix     citalopram 20 MG tablet  Commonly known as:  CELEXA  Take 1 tablet (20 mg total) by mouth once daily. 1 tab daily for depression     ergocalciferol 50,000 unit Cap  Commonly known as:  ERGOCALCIFEROL  Take 1 capsule (50,000 Units total) by mouth every 7 days.     famotidine 20 MG tablet  Commonly known as:  PEPCID  Take 1 tablet (20 mg total) by mouth once daily.     * FLUoxetine 10 MG Tab  Take 1 tablet (10 mg total) by mouth once daily.     * FLUoxetine 10 MG capsule  Take 10 mg by mouth once daily.     folic acid 1 MG tablet  Commonly known as:  FOLVITE  Take 1 tablet (1 mg total) by mouth once daily.     glipiZIDE 2.5 MG Tr24  Commonly known as:  GLUCOTROL  Take 1 tablet (hold if less than 150) at lunch (non dialysis days)     insulin aspart U-100 100 unit/mL injection  Commonly known as:  NovoLOG U-100 Insulin aspart  Use correction scale 180-230+1, 231-280+2, 281-330+3, 331-380+4, >380+5, max 15 units.     insulin glargine 100 unit/mL injection  Commonly known as:  Lantus U-100 Insulin  Inject 10-12 Units into the skin every evening.     lancets Misc  1 lancet by Misc.(Non-Drug; Combo Route) route 2 (two) times daily with meals. Check glucose before meals and bed     * levETIRAcetam 1000 MG tablet  Commonly known as:  KEPPRA  Take 1 tablet (1,000 mg total) by mouth once daily.     * levETIRAcetam 500 MG Tab  Commonly known as:  KEPPRA  Take 1 tablet (500 mg total) by mouth every Mon, Wed, Fri. Monday Wednesday and Friday after DIALYSIS take additional 500mg     ondansetron 4 MG Tbdl  Commonly known as:  ZOFRAN-ODT  Take 1  tablet (4 mg total) by mouth every 8 (eight) hours as needed.     polyethylene glycol 17 gram/dose powder  Commonly known as:  Miralax  Take 17 g by mouth 2 (two) times daily.     sevelamer carbonate 800 mg Tab  Commonly known as:  RENVELA  Take 800 mg by mouth 3 (three) times daily with meals.         * This list has 4 medication(s) that are the same as other medications prescribed for you. Read the directions carefully, and ask your doctor or other care provider to review them with you.            STOP taking these medications    ALPRAZolam 0.5 MG tablet  Commonly known as:  XANAX            Indwelling Lines/Drains at time of discharge:   Lines/Drains/Airways     Drain                 Hemodialysis AV Graft Right upper arm -- days          Pressure Ulcer                 Pressure Injury 07/05/19 1600 Right Buttocks Stage 2 157 days         Pressure Injury 07/05/19 Left Buttocks Stage 2 157 days                Time spent on the discharge of patient: >45 minutes  Patient was seen and examined on the date of discharge and determined to be suitable for discharge.       Discussed with staff  Tisha Aguilera PA-C  Department of Hospital Medicine  [facility]

## 2019-12-09 NOTE — ASSESSMENT & PLAN NOTE
- Last A1c 6.8 in 8/2019; repeat 7.8 today  -  on admit; patient endorses not taking home insulin  - 13U detemir qdaily, aspart 4U TID WM, low dose SSI  - Renal/DM diet  - Monitor BGs and adjust insulin PRN  12/5-12/6: BG at goal, monitor for hypoglycemia  12/7: BG 400s, increase prandial and add sliding scale  BG improved at d/c

## 2019-12-09 NOTE — NURSING
Patient and family gathering belongings at bedside. Patient and family given DC instructions. Patient and family voice understanding. Patient and family voice no questions or concerns at this time. Patient wheeled down via own wheelchair with family at side.

## 2019-12-09 NOTE — TELEPHONE ENCOUNTER
Spoke with pt's  and follow up appt scheduled per Dr Brown.    ----- Message from Mariah Brown MD sent at 12/7/2019  9:05 AM CST -----  Can we set this patient up with a follow up appointment with Dr Valencia in 2 weeks with CRIS layne US?    Thanks!    Mariah

## 2019-12-10 LAB — POCT GLUCOSE: 263 MG/DL (ref 70–110)

## 2019-12-10 NOTE — PLAN OF CARE
12/10/19 0722   Final Note   Assessment Type Final Discharge Note     Patient discharged on 12/9/19 to start HD treatments at [facility] & resume out pt rehab at [facility]. Discharge summary faxed to [facility].   Normal for race

## 2019-12-11 ENCOUNTER — TELEPHONE (OUTPATIENT)
Dept: INTERNAL MEDICINE | Facility: CLINIC | Age: 59
End: 2019-12-11

## 2019-12-11 ENCOUNTER — PATIENT OUTREACH (OUTPATIENT)
Dept: ADMINISTRATIVE | Facility: CLINIC | Age: 59
End: 2019-12-11

## 2019-12-11 DIAGNOSIS — F41.9 ANXIETY: Primary | ICD-10-CM

## 2019-12-11 RX ORDER — LORAZEPAM 0.5 MG/1
TABLET ORAL
Qty: 30 TABLET | Refills: 0 | Status: ON HOLD | OUTPATIENT
Start: 2019-12-11 | End: 2020-07-27

## 2019-12-11 NOTE — TELEPHONE ENCOUNTER
----- Message from Lindsay Durbin RN sent at 12/11/2019  9:52 AM CST -----  Good morning,   Spoke with Pt during [facility] discharge call. Pt prescribed susan log and Lantus insulin. Pts daughter states that pt never received sliding scale and is only currently taking 13 U of Lantus in the evening. Please reach to pt to clarify medications and supply sliding scale parameters if necessary. If Novolog discontinued, please remove from med rec.   Thank you,   Lindsay Durbin RN

## 2019-12-11 NOTE — TELEPHONE ENCOUNTER
Spoke with Reina ruiz her Mom's recent D/C from hospital.  She is now doing hemodialysis  at [facility] [address] with [facility], but she does get anxious before going top dialysis.  I have recommended a trial of Ativan .5mg 1 hour prior to dialysis, three days/week. I have also discussed a SS Insulin schedule for her Novolog:  Accucheck: 200-250, take 3 units                       250-300, take 6 units                       300-350, take 9 units                       350-400, take 12 units    Pt has a Hospital f/u next week with Selena Yadav, an Endocrine f/u with Shirley Mixon later this month, and and a RTC to me in January.

## 2019-12-11 NOTE — PATIENT INSTRUCTIONS
"  Discharge Instructions for Hyperkalemia  You have been diagnosed with hyperkalemia (a high level of potassium in the blood). Potassium is important to the function of the nerve and muscle cells, including the cells of the heart. But a high level of potassium in the blood can cause  serious problems such as abnormal heart rhythms and even heart attack.  Diet changes  · Eat less of these potassium-rich foods:  ¨ Bananas (avoid bananas completely)  ¨ Apricots, fresh or dried  ¨ Oranges and orange juice  ¨ Grapefruit juice  ¨ Tomatoes, tomato sauce, and tomato juice  ¨ Spinach  ¨ Green, leafy vegetables, including salad greens, kale, broccoli, chard, and collards  ¨ Melons (all kinds)  ¨ Peas  ¨ Beans  ¨ Potatoes  ¨ Sweet potatoes  ¨ Avocados and guacamole  ¨ Vegetable juice (homemade or store-bought) and vegetable juice cocktail  ¨ Fruit juices  ¨ Nuts, including pistachios, almonds, peanuts, hazelnuts, [place], cashew, mixed  ¨ "Lite" or reduced sodium salt  Other home care  · Tell your healthcare provider about all prescription and over-the-counter medicines you are taking. Certain medicines can increase potassium levels.  · Take all medicines exactly as directed.  · Have your potassium levels checked regularly.  · Keep all follow-up appointments. Your healthcare provider needs to monitor your condition closely.  · Learn to take your own pulse. If your pulse is less than 60 beats per minute or irregular, call your provider.  Follow-up  Make a follow-up appointment as directed by our staff.     When to Call Your healthcare provider  Call your provider right away if you have any of the following:  · Chest pain (call 911)  · Fainting (call 911)  · Shortness of breath (call 911 if severe)  · Slow, irregular heartbeat  · Fatigue  · Dizziness  · Lightheadedness  · Confusion     © 3486-7510 [employer]. [address] [place] [address], [place] [address]. All rights reserved. This information is not intended as a " substitute for professional medical care. Always follow your healthcare professional's instructions.

## 2019-12-12 NOTE — PROGRESS NOTES
"[facility]  New Visit Progress Note   Recent Hospital Discharge     PRESENTING HISTORY     Chief Complaint/Reason for Visit:  Follow up Hospital Discharge   No chief complaint on file.    PCP: Bevrely Muniz MD    History of Present Illness: Ms. Lucy Perez is a 59 y.o. female who was recently admitted to the hospital.  ___________________________________    Today:  Since most recent discharge, she reports doing well, not missed any HD appts and "much happier with the new location".   She does not endorse SOB at rest or with movement (still in OP therapy on [facility] and "feels improving"). Denies Chest discomforts or coughing.   Her mood is improved in comparison to when last seen in my clinic.   She is here today with her supportive spouse.         Review of Systems:  Eyes: denies visual changes at this time denies floaters   ENT: no nasal congestion or sore throat  Respiratory: no cough or shorness of breath  Cardiovascular: no chest pain or palpitations  Gastrointestinal: no nausea or vomiting, no abdominal pain or change in bowel habits  Genitourinary: no hematuria or dysuria; denies frequency  Hematologic/Lymphatic: no easy bruising or lymphadenopathy  Musculoskeletal: no arthralgias or myalgias  Neurological: no seizures or tremors  Endocrine: no heat or cold intolerance      PAST HISTORY:     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    Cortical cataract of both eyes 7/26/2016    Decubitus ulcer of buttock, stage 2     Degeneration of lumbar or lumbosacral intervertebral disc 3/5/2013    S/p MRI L-spine 5/2009     Depression     Encounter for " blood transfusion     ESRD on hemodialysis     Started August 2018    General anesthetics causing adverse effect in therapeutic use     Hemorrhagic cerebrovascular accident (CVA)     8/2016 s/p Hemicraniotomy at [facility] with Left hemiparesis    History of stroke 6/28/2017    s/p R-MCA stroke with R-putaminal hemorrhagic transformation in 8/2016 and 11/2016 (s/p hemicraniotomy at [facility]) with residual L hemiparesis, on AED s/p CVA      Hypertensive retinopathy of both eyes 7/26/2016    Impingement syndrome of right shoulder 7/2/2014    Obesity     THERESA (obstructive sleep apnea) 3/5/2013    No Home CPAP 2ndary to cost     Partial symptomatic epilepsy with complex partial seizures, not intractable, without status epilepticus     Rheumatoid arthritis(714.0)     Rotator cuff tear 7/2/2014    Sarcoidosis     Stroke 2016    left sided flaccidity, SAH    Vertebral artery stenosis 3/5/2013    S/p Stenting per Dr Burnett        Past Surgical History:   Procedure Laterality Date    BREAST SURGERY      breast reduction    COLONOSCOPY N/A 8/11/2016    Procedure: COLONOSCOPY;  Surgeon: Jerry Vilchis MD;  Location: [facility] ENDO (4TH FLR);  Service: Endoscopy;  Laterality: N/A;  Patient reports difficulty awaking from anesthesia in the past.    DECLOTTING OF VASCULAR GRAFT Right 6/20/2019    Procedure: DECLOT-GRAFT;  Surgeon: Cabrera Irwin MD;  Location: [facility] CATH LAB;  Service: Cardiology;  Laterality: Right;    DECLOTTING OF VASCULAR GRAFT Right 12/6/2019    Procedure: DECLOT-GRAFT;  Surgeon: Cabrera Irwin MD;  Location: [facility] OR 2ND FLR;  Service: Peripheral Vascular;  Laterality: Right;    FISTULOGRAM Right 2/11/2019    Procedure: Fistulogram;  Surgeon: Meir Valencia MD;  Location: [facility] CATH LAB;  Service: Peripheral Vascular;  Laterality: Right;    FISTULOGRAM Right 7/8/2019    Procedure: Fistulogram;  Surgeon: WELLINGTON Palm III, MD;  Location: [facility] CATH LAB;  Service: Peripheral Vascular;   "Laterality: Right;    FISTULOGRAM Right 12/6/2019    Procedure: Fistulogram;  Surgeon: Cabrera Irwin MD;  [facility];  Service: Peripheral Vascular;  Laterality: Right;    HYSTERECTOMY  1999    JOSE LUIS/BSO (AUB)    PLACEMENT OF ARTERIOVENOUS GRAFT Right 10/18/2018    Procedure: AV GRAFT CREATION;  Surgeon: Meir Valencia MD;  [facility];  Service: Cardiovascular;  Laterality: Right;    ROTATOR CUFF REPAIR Right July 9, 2014    right side    Skull surgery      Aneurysm    stent placed      in vertebral artery    TOTAL REDUCTION MAMMOPLASTY      TUBAL LIGATION         Family History   Problem Relation Age of Onset    Diabetes Mother     Hypertension Mother     Heart disease Mother     Heart attack Mother     Breast cancer Mother     Stroke Sister     Hypertension Sister     Sleep apnea Sister     No Known Problems Daughter     Diabetes Son     Bell's palsy Sister     Lupus Sister     Blindness Maternal Grandmother     Diabetes Unknown         "My entire family family has diabetes"    Stroke Maternal Aunt     Colon cancer Neg Hx     Ovarian cancer Neg Hx        Social History     Socioeconomic History    Marital status:      Spouse name: Not on file    Number of children: Not on file    Years of education: Not on file    Highest education level: Not on file   Occupational History    Not on file   Social Needs    Financial resource strain: Not on file    Food insecurity:     Worry: Not on file     Inability: Not on file    Transportation needs:     Medical: Not on file     Non-medical: Not on file   Tobacco Use    Smoking status: Never Smoker    Smokeless tobacco: Never Used   Substance and Sexual Activity    Alcohol use: Yes     Comment: occasional wine cooler     Drug use: Never    Sexual activity: Yes     Partners: Male     Birth control/protection: Post-menopausal   Lifestyle    Physical activity:     Days per week: Not on file     " "Minutes per session: Not on file    Stress: Not on file   Relationships    Social connections:     Talks on phone: Not on file     Gets together: Not on file     Attends Scientologist service: Not on file     Active member of club or organization: Not on file     Attends meetings of clubs or organizations: Not on file     Relationship status: Not on file   Other Topics Concern    Not on file   Social History Narrative    . 2 children(Wilder and Reina). Does not work. Previously worked as a .        MEDICATIONS & ALLERGIES:     Current Outpatient Medications on File Prior to Visit   Medication Sig Dispense Refill    acetaminophen (TYLENOL) 325 MG tablet Take 2 tablets (650 mg total) by mouth every 6 (six) hours as needed for Pain.  0    albuterol (VENTOLIN HFA) 90 mcg/actuation inhaler Inhale 2 puffs into the lungs every 6 (six) hours as needed for Wheezing. Rescue 18 g 0    aspirin (ECOTRIN) 81 MG EC tablet Take 81 mg by mouth every morning.       atorvastatin (LIPITOR) 40 MG tablet TAKE 1 TABLET ONE TIME DAILY FOR CHOLESTEROL 90 tablet 2    BD INSULIN PEN NEEDLE UF SHORT 31 gauge x 5/16" Ndle USE TO INJECT NOVOLOG FLEXPEN BEFORE MEALS 150 each 11    bisacodyl (DULCOLAX) 10 mg Supp Place 1 suppository (10 mg total) rectally daily as needed. 30 suppository 3    blood sugar diagnostic Strp To check BG 4  times daily, to use with insurance preferred meter-true metrix 400 each 3    blood-glucose meter kit To check BG 4 times daily, to use with insurance preferred meter-true metrix 1 each 1    citalopram (CELEXA) 20 MG tablet Take 1 tablet (20 mg total) by mouth once daily. 1 tab daily for depression 30 tablet 4    ergocalciferol (ERGOCALCIFEROL) 50,000 unit Cap Take 1 capsule (50,000 Units total) by mouth every 7 days.      famotidine (PEPCID) 20 MG tablet Take 1 tablet (20 mg total) by mouth once daily. 90 tablet 2    FLUoxetine 10 MG capsule Take 10 mg by mouth once daily.  4    " FLUoxetine 10 MG Tab Take 1 tablet (10 mg total) by mouth once daily. (Patient not taking: Reported on 12/11/2019) 30 tablet 4    folic acid (FOLVITE) 1 MG tablet Take 1 tablet (1 mg total) by mouth once daily. 90 tablet 2    glipiZIDE (GLUCOTROL) 2.5 MG TR24 Take 1 tablet (hold if less than 150) at lunch (non dialysis days) 30 tablet 4    insulin aspart U-100 (NOVOLOG U-100 INSULIN ASPART) 100 unit/mL injection Use correction scale 180-230+1, 231-280+2, 281-330+3, 331-380+4, >380+5, max 15 units. (Patient not taking: Reported on 12/11/2019) 3 vial 3    insulin glargine (LANTUS U-100 INSULIN) 100 unit/mL injection Inject 10-12 Units into the skin every evening. 6 mL 6    lancets Misc 1 lancet by Misc.(Non-Drug; Combo Route) route 2 (two) times daily with meals. Check glucose before meals and bed 300 each 4    levETIRAcetam (KEPPRA) 1000 MG tablet Take 1 tablet (1,000 mg total) by mouth once daily. 90 tablet 3    levETIRAcetam (KEPPRA) 500 MG Tab Take 1 tablet (500 mg total) by mouth every Mon, Wed, Fri. Monday Wednesday and Friday after DIALYSIS take additional 500mg 36 tablet 3    lidocaine (LIDODERM) 5 % Place 1 patch onto the skin once daily. Remove & Discard patch within 12 hours or as directed by MD (Patient not taking: Reported on 12/11/2019) 10 patch 1    LORazepam (ATIVAN) 0.5 MG tablet 1 tab 1 hour prior to Dialysis 30 tablet 0    ondansetron (ZOFRAN-ODT) 4 MG TbDL Take 1 tablet (4 mg total) by mouth every 8 (eight) hours as needed. 10 tablet 0    polyethylene glycol (MIRALAX) 17 gram/dose powder Take 17 g by mouth 2 (two) times daily. 1 Bottle 6    pregabalin (LYRICA) 25 MG capsule Take 1 capsule (25 mg total) by mouth once daily. May take additional dose after HD. (Patient not taking: Reported on 12/11/2019) 90 capsule 4    sevelamer carbonate (RENVELA) 800 mg Tab Take 800 mg by mouth 3 (three) times daily with meals.       No current facility-administered medications on file prior to visit.          Review of patient's allergies indicates:   Allergen Reactions    Lisinopril Other (See Comments)     Angioedema      Vicodin [hydrocodone-acetaminophen] Rash     No problem with acetaminophen        OBJECTIVE:     Vital Signs:  There were no vitals filed for this visit.  Wt Readings from Last 1 Encounters:   12/09/19 0653 88.7 kg (195 lb 8.8 oz)   12/08/19 0400 91.2 kg (201 lb 1 oz)   12/06/19 1633 93.4 kg (205 lb 14.6 oz)   12/06/19 0400 93.4 kg (205 lb 14.6 oz)   12/05/19 0400 89.7 kg (197 lb 11.2 oz)   12/04/19 2205 86 kg (189 lb 9.5 oz)   12/04/19 1034 86 kg (189 lb 9.5 oz)     There is no height or weight on file to calculate BMI.     Wt Readings from Last 3 Encounters:   12/18/19 88.5 kg (195 lb)   12/09/19 88.7 kg (195 lb 8.8 oz)   11/07/19 86 kg (189 lb 9.5 oz)     Temp Readings from Last 3 Encounters:   12/09/19 98.3 °F (36.8 °C) (Axillary)   10/08/19 98 °F (36.7 °C) (Oral)   09/05/19 98.7 °F (37.1 °C) (Oral)     BP Readings from Last 3 Encounters:   12/18/19 110/78   12/09/19 (!) 104/51   10/24/19 100/68     Pulse Readings from Last 3 Encounters:   12/18/19 84   12/09/19 86   10/24/19 84      Physical Exam:  General: Well developed, well nourished. No distress.  HEENT:  ears are normal.   Eyes: Clear conjunctiva.  Neck: Supple, symmetrical neck; trachea midline. No JVD  Lungs: Clear to auscultation bilaterally and normal respiratory effort.  Cardiovascular: Heart with regular rate and rhythm. No murmurs, gallops or rubs  Extremities: No LE edema. Pulses 2+ and symmetric.   + bruit and thrill to RUE AV graft site  Abdomen: Abdomen is soft, non-tender non-distended with normal bowel sounds.  Skin: Skin color, texture, turgor normal. No rashes.  Lymph Nodes: No cervical or supraclavicular adenopathy.  Psychiatric: Not depressed. Mood appropriate         Laboratory  Lab Results   Component Value Date    WBC 5.27 12/09/2019    HGB 7.4 (L) 12/09/2019    HCT 24.1 (L) 12/09/2019     (H) 12/09/2019      (L) 12/09/2019     BMP  Lab Results   Component Value Date     12/09/2019    K 3.6 12/09/2019     12/09/2019    CO2 25 12/09/2019    BUN 51 (H) 12/09/2019    CREATININE 7.7 (H) 12/09/2019    CALCIUM 8.3 (L) 12/09/2019    ANIONGAP 14 12/09/2019    ESTGFRAFRICA 6.0 (A) 12/09/2019    EGFRNONAA 5.2 (A) 12/09/2019     Lab Results   Component Value Date    ALT 7 (L) 12/04/2019    AST 7 (L) 12/04/2019    ALKPHOS 97 12/04/2019    BILITOT 0.4 12/04/2019     Lab Results   Component Value Date    INR 0.9 12/08/2018    INR 0.9 08/04/2017    INR 0.9 08/03/2017     Lab Results   Component Value Date    HGBA1C 7.8 (H) 12/04/2019     No results for input(s): POCTGLUCOSE in the last 72 hours.      TRANSITION OF CARE:     [facility] On Call Contact Note: 12/11/2019    Family and/or Caretaker present at visit?  Yes.  Diagnostic tests reviewed/disposition: I have reviewed all completed as well as pending diagnostic tests at the time of discharge.  Disease/illness education: Hyperkalemia, depression, Seizure Disorder, ESRD, Meds  Home health/community services discussion/referrals: Patient does not have home health established from hospital visit.  They do not need home health.  If needed, we will set up home health for the patient.   Establishment or re-establishment of referral orders for community resources: No other necessary community resources.   Discussion with other health care providers: No discussion with other health care providers necessary.     Transition of Care Visit:     I have reviewed and updated the history and problem list.  I have reconciled the medication list.  I have discussed the hospitalization and current medical issues, prognosis and plans with the patient/family.  I  spent more than 50% of time discussing the care with the patient/family.  Total Face-to-Face Encounter in the [facility]: 60 minutes.    Medications Reconciliation:   I have reconciled the patient's home medications and  "discharge medications with the patient/family. I have updated all changes.  Refer to After-Visit Medication List.    ASSESSMENT & PLAN:     HIGH RISK CONDITION(S):    Dtr's Cell NumberKaren:   [phone]  Work: [phone]    Recent admission for Hyperkalemia in the setting of missing HD sessions during change in Dialysis sites:   ESRD TTHS  Chronic Anemia in ESRD  Acute on chronic HFpEF   Problem with vascular access: (appt with Vascular 12/20)  Underwent emergent dialysis on admit with consult to Nephrology  FNC: dialysis center confirmed prior to discharge per documentation.   *Serum K at discharge on 12/9: 3.6  ` [place]  *Per her family, has had "blood drawn at dialysis center since most recent discharge on 12/9/2019.         Other Medical issues:   Type 2 diabetes mellitus with chronic kidney disease on chronic dialysis, with long-term current use of insulin:   Most recent A1c: 6.8%   ` continue Lantus 10-12 units nightly   ` continue Glipizide  ` Novolog SSI  appt with Endocrine (12/24)         History of stroke with chronic L sided hemiparesis:   HLD  Wheelchair bound  ` continue ASA    ` continue Lipitor  *Outpatient Rehab at [facility] [address]    Partial symptomatic epilepsy with complex partial seizures, not intractable, without status epilepticus:   (stable; off the WEllbutrin; does not endorse any recent witnessed activity)  ` Keppra        Mixed anxiety and depressive disorder:   ` ? continue Celexa or Prozac which noted that Celexa was started by Dr. Max on 11/11/2019; will message Dr. Muniz in regards for clarity and contact her family in regards.       *Advised to follow up with PCP in 4 weeks, sooner if indicated and to message this provider with any other questions or help may be able to provide in the interim.       Future Appointments   Date Time Provider Department Center   12/20/2019  1:00 PM VASCULAR, LAB [facility]   12/20/2019  2:45 PM Meir Valencia MD [facility] " "[address]   12/24/2019 10:00 AM Sia Mooney RN [facility] [address]   12/24/2019 11:30 AM PARESH Lacy, MARINAP [facility] IM [address]   1/7/2020 11:00 AM Beverly Muniz MD [facility]        Medication List           Accurate as of December 18, 2019  2:44 PM. If you have any questions, ask your nurse or doctor.               CONTINUE taking these medications    acetaminophen 325 MG tablet  Commonly known as:  TYLENOL  Take 2 tablets (650 mg total) by mouth every 6 (six) hours as needed for Pain.     albuterol 90 mcg/actuation inhaler  Commonly known as:  Ventolin HFA  Inhale 2 puffs into the lungs every 6 (six) hours as needed for Wheezing. Rescue     aspirin 81 MG EC tablet  Commonly known as:  ECOTRIN     atorvastatin 40 MG tablet  Commonly known as:  LIPITOR  TAKE 1 TABLET ONE TIME DAILY FOR CHOLESTEROL     BD Ultra-Fine Short Pen Needle 31 gauge x 5/16" Ndle  Generic drug:  pen needle, diabetic  USE TO INJECT NOVOLOG FLEXPEN BEFORE MEALS     bisacodyl 10 mg Supp  Commonly known as:  DULCOLAX  Place 1 suppository (10 mg total) rectally daily as needed.     blood sugar diagnostic Strp  To check BG 4  times daily, to use with insurance preferred meter-true metrix     blood-glucose meter kit  To check BG 4 times daily, to use with insurance preferred meter-true metrix     citalopram 20 MG tablet  Commonly known as:  CELEXA  Take 1 tablet (20 mg total) by mouth once daily. 1 tab daily for depression     ergocalciferol 50,000 unit Cap  Commonly known as:  ERGOCALCIFEROL  Take 1 capsule (50,000 Units total) by mouth every 7 days.     famotidine 20 MG tablet  Commonly known as:  PEPCID  Take 1 tablet (20 mg total) by mouth once daily.     * FLUoxetine 10 MG Tab  Take 1 tablet (10 mg total) by mouth once daily.     * FLUoxetine 10 MG capsule     folic acid 1 MG tablet  Commonly known as:  FOLVITE  Take 1 tablet (1 mg total) by mouth once daily.     glipiZIDE 2.5 MG Tr24  Commonly known " as:  GLUCOTROL  Take 1 tablet (hold if less than 150) at lunch (non dialysis days)     insulin aspart U-100 100 unit/mL injection  Commonly known as:  NovoLOG U-100 Insulin aspart  Use correction scale 180-230+1, 231-280+2, 281-330+3, 331-380+4, >380+5, max 15 units.     insulin glargine 100 unit/mL injection  Commonly known as:  Lantus U-100 Insulin  Inject 10-12 Units into the skin every evening.     lancets Misc  1 lancet by Misc.(Non-Drug; Combo Route) route 2 (two) times daily with meals. Check glucose before meals and bed     * levETIRAcetam 1000 MG tablet  Commonly known as:  KEPPRA  Take 1 tablet (1,000 mg total) by mouth once daily.     * levETIRAcetam 500 MG Tab  Commonly known as:  KEPPRA  Take 1 tablet (500 mg total) by mouth every Mon, Wed, Fri. Monday Wednesday and Friday after DIALYSIS take additional 500mg     lidocaine 5 %  Commonly known as:  LIDODERM  Place 1 patch onto the skin once daily. Remove & Discard patch within 12 hours or as directed by MD     LORazepam 0.5 MG tablet  Commonly known as:  ATIVAN  1 tab 1 hour prior to Dialysis     ondansetron 4 MG Tbdl  Commonly known as:  ZOFRAN-ODT  Take 1 tablet (4 mg total) by mouth every 8 (eight) hours as needed.     polyethylene glycol 17 gram/dose powder  Commonly known as:  Miralax  Take 17 g by mouth 2 (two) times daily.     pregabalin 25 MG capsule  Commonly known as:  LYRICA  Take 1 capsule (25 mg total) by mouth once daily. May take additional dose after HD.     sevelamer carbonate 800 mg Tab  Commonly known as:  RENVELA         * This list has 4 medication(s) that are the same as other medications prescribed for you. Read the directions carefully, and ask your doctor or other care provider to review them with you.                Signing Physician:  MARISELA Alejandra

## 2019-12-17 ENCOUNTER — PATIENT OUTREACH (OUTPATIENT)
Dept: ADMINISTRATIVE | Facility: OTHER | Age: 59
End: 2019-12-17

## 2019-12-18 ENCOUNTER — PATIENT OUTREACH (OUTPATIENT)
Dept: ADMINISTRATIVE | Facility: HOSPITAL | Age: 59
End: 2019-12-18

## 2019-12-18 ENCOUNTER — OFFICE VISIT (OUTPATIENT)
Dept: INTERNAL MEDICINE | Facility: CLINIC | Age: 59
End: 2019-12-18
Payer: MEDICARE

## 2019-12-18 ENCOUNTER — TELEPHONE (OUTPATIENT)
Dept: INTERNAL MEDICINE | Facility: CLINIC | Age: 59
End: 2019-12-18

## 2019-12-18 VITALS
WEIGHT: 195 LBS | HEIGHT: 63 IN | BODY MASS INDEX: 34.55 KG/M2 | SYSTOLIC BLOOD PRESSURE: 110 MMHG | OXYGEN SATURATION: 91 % | HEART RATE: 84 BPM | DIASTOLIC BLOOD PRESSURE: 78 MMHG

## 2019-12-18 DIAGNOSIS — I69.359 HEMIPLEGIA AS LATE EFFECT OF STROKE: ICD-10-CM

## 2019-12-18 DIAGNOSIS — I10 ESSENTIAL HYPERTENSION: ICD-10-CM

## 2019-12-18 DIAGNOSIS — E87.5 HYPERKALEMIA: ICD-10-CM

## 2019-12-18 DIAGNOSIS — N18.6 TYPE 2 DIABETES MELLITUS WITH CHRONIC KIDNEY DISEASE ON CHRONIC DIALYSIS, WITH LONG-TERM CURRENT USE OF INSULIN: ICD-10-CM

## 2019-12-18 DIAGNOSIS — N18.6 ESRD (END STAGE RENAL DISEASE): ICD-10-CM

## 2019-12-18 DIAGNOSIS — Z09 HOSPITAL DISCHARGE FOLLOW-UP: Primary | ICD-10-CM

## 2019-12-18 DIAGNOSIS — G40.209 PARTIAL SYMPTOMATIC EPILEPSY WITH COMPLEX PARTIAL SEIZURES, NOT INTRACTABLE, WITHOUT STATUS EPILEPTICUS: ICD-10-CM

## 2019-12-18 DIAGNOSIS — Z86.73 HISTORY OF STROKE: ICD-10-CM

## 2019-12-18 DIAGNOSIS — E11.22 TYPE 2 DIABETES MELLITUS WITH CHRONIC KIDNEY DISEASE ON CHRONIC DIALYSIS, WITH LONG-TERM CURRENT USE OF INSULIN: ICD-10-CM

## 2019-12-18 DIAGNOSIS — Z79.4 TYPE 2 DIABETES MELLITUS WITH CHRONIC KIDNEY DISEASE ON CHRONIC DIALYSIS, WITH LONG-TERM CURRENT USE OF INSULIN: ICD-10-CM

## 2019-12-18 DIAGNOSIS — Z99.2 TYPE 2 DIABETES MELLITUS WITH CHRONIC KIDNEY DISEASE ON CHRONIC DIALYSIS, WITH LONG-TERM CURRENT USE OF INSULIN: ICD-10-CM

## 2019-12-18 PROCEDURE — 99999 PR PBB SHADOW E&M-EST. PATIENT-LVL V: ICD-10-PCS | Mod: PBBFAC,HCNC,, | Performed by: NURSE PRACTITIONER

## 2019-12-18 PROCEDURE — 99999 PR PBB SHADOW E&M-EST. PATIENT-LVL V: CPT | Mod: PBBFAC,HCNC,, | Performed by: NURSE PRACTITIONER

## 2019-12-18 PROCEDURE — 99495 TCM SERVICES (MODERATE COMPLEXITY): ICD-10-PCS | Mod: HCNC,S$GLB,, | Performed by: NURSE PRACTITIONER

## 2019-12-18 PROCEDURE — 99495 TRANSJ CARE MGMT MOD F2F 14D: CPT | Mod: HCNC,S$GLB,, | Performed by: NURSE PRACTITIONER

## 2019-12-18 NOTE — TELEPHONE ENCOUNTER
"----- Message from Selena Arriaza, MARISELA sent at 12/18/2019  2:02 PM CST -----  Nena Muniz,     Seeing the sweet lady, Ms. Ayala today in clinic today, who is doing better since most recent admission. Spoke with her daughter, but we are uncertain on the "anti depressant" you preference to have Ms Ayala on at this time. Her dtr was not home at the time and could not verify what she is currently taking.   Noted that on 11/11/2019, the Wellbutrin was discontinued and was started on Celexa; however, noted that Prozac is also on her record..did you want her on both of these??    Will be in touch with the family on tomorrow again :)  Thanks,   Selena   "

## 2019-12-20 ENCOUNTER — HOSPITAL ENCOUNTER (OUTPATIENT)
Dept: VASCULAR SURGERY | Facility: CLINIC | Age: 59
Discharge: HOME | End: 2019-12-20
Attending: SURGERY
Payer: MEDICARE

## 2019-12-20 ENCOUNTER — TELEPHONE (OUTPATIENT)
Dept: INTERNAL MEDICINE | Facility: CLINIC | Age: 59
End: 2019-12-20

## 2019-12-20 ENCOUNTER — OFFICE VISIT (OUTPATIENT)
Dept: VASCULAR SURGERY | Facility: CLINIC | Age: 59
End: 2019-12-20
Payer: MEDICARE

## 2019-12-20 VITALS
TEMPERATURE: 99 F | BODY MASS INDEX: 34.55 KG/M2 | HEART RATE: 72 BPM | DIASTOLIC BLOOD PRESSURE: 63 MMHG | WEIGHT: 195 LBS | SYSTOLIC BLOOD PRESSURE: 140 MMHG | HEIGHT: 63 IN

## 2019-12-20 DIAGNOSIS — N18.6 TYPE 2 DIABETES MELLITUS WITH CHRONIC KIDNEY DISEASE ON CHRONIC DIALYSIS, WITH LONG-TERM CURRENT USE OF INSULIN: Primary | ICD-10-CM

## 2019-12-20 DIAGNOSIS — N18.6 ESRD (END STAGE RENAL DISEASE) ON DIALYSIS: ICD-10-CM

## 2019-12-20 DIAGNOSIS — Z79.4 TYPE 2 DIABETES MELLITUS WITH CHRONIC KIDNEY DISEASE ON CHRONIC DIALYSIS, WITH LONG-TERM CURRENT USE OF INSULIN: Primary | ICD-10-CM

## 2019-12-20 DIAGNOSIS — Z99.2 TYPE 2 DIABETES MELLITUS WITH CHRONIC KIDNEY DISEASE ON CHRONIC DIALYSIS, WITH LONG-TERM CURRENT USE OF INSULIN: Primary | ICD-10-CM

## 2019-12-20 DIAGNOSIS — N18.6 ANEMIA IN ESRD (END-STAGE RENAL DISEASE): Primary | ICD-10-CM

## 2019-12-20 DIAGNOSIS — D63.1 ANEMIA IN ESRD (END-STAGE RENAL DISEASE): Primary | ICD-10-CM

## 2019-12-20 DIAGNOSIS — Z99.2 ESRD (END STAGE RENAL DISEASE) ON DIALYSIS: ICD-10-CM

## 2019-12-20 DIAGNOSIS — E55.9 VITAMIN D DEFICIENCY: ICD-10-CM

## 2019-12-20 DIAGNOSIS — I10 ESSENTIAL HYPERTENSION: ICD-10-CM

## 2019-12-20 DIAGNOSIS — E11.22 TYPE 2 DIABETES MELLITUS WITH CHRONIC KIDNEY DISEASE ON CHRONIC DIALYSIS, WITH LONG-TERM CURRENT USE OF INSULIN: Primary | ICD-10-CM

## 2019-12-20 PROCEDURE — 3008F BODY MASS INDEX DOCD: CPT | Mod: HCNC,CPTII,NTX,S$GLB | Performed by: SURGERY

## 2019-12-20 PROCEDURE — 99214 OFFICE O/P EST MOD 30 MIN: CPT | Mod: HCNC,NTX,S$GLB, | Performed by: SURGERY

## 2019-12-20 PROCEDURE — 99214 PR OFFICE/OUTPT VISIT, EST, LEVL IV, 30-39 MIN: ICD-10-PCS | Mod: HCNC,NTX,S$GLB, | Performed by: SURGERY

## 2019-12-20 PROCEDURE — 93990 PR DUPLEX HEMODIALYSIS ACCESS: ICD-10-PCS | Mod: HCNC,S$GLB,TXP, | Performed by: SURGERY

## 2019-12-20 PROCEDURE — 3008F PR BODY MASS INDEX (BMI) DOCUMENTED: ICD-10-PCS | Mod: HCNC,CPTII,NTX,S$GLB | Performed by: SURGERY

## 2019-12-20 PROCEDURE — 93990 DOPPLER FLOW TESTING: CPT | Mod: HCNC,S$GLB,TXP, | Performed by: SURGERY

## 2019-12-20 PROCEDURE — 99999 PR PBB SHADOW E&M-EST. PATIENT-LVL IV: CPT | Mod: PBBFAC,HCNC,TXP, | Performed by: SURGERY

## 2019-12-20 PROCEDURE — 99999 PR PBB SHADOW E&M-EST. PATIENT-LVL IV: ICD-10-PCS | Mod: PBBFAC,HCNC,TXP, | Performed by: SURGERY

## 2019-12-20 RX ORDER — CLOPIDOGREL BISULFATE 75 MG/1
75 TABLET ORAL DAILY
Qty: 120 TABLET | Refills: 6 | Status: SHIPPED | OUTPATIENT
Start: 2019-12-20 | End: 2021-03-16

## 2019-12-20 NOTE — TELEPHONE ENCOUNTER
----- Message from Dipti Franco sent at 12/20/2019  2:05 PM CST -----  Contact: Karen [phone]  Patient was taking both FLUoxetine 10 MG Tab and citalopram (CELEXA) 20 MG tablet which are both the same. Patient stopped Fluoxetine  And is only taking citalopram.  Please call and advise  Thank you

## 2019-12-20 NOTE — PROGRESS NOTES
Lucy Aubrey Perez  12/20/2019    HPI:   Patient is a 59 y.o. female with a history of HTN, HLD, L hemiplegia after large stroke in 2016, DM II, stage V CKD since August 2018 MWF for f/u  Cynthia HD well now since recent declot by Dr Irwin    She has a residual impairment on her left upper extremity and left lower extremity from her stroke and is currently wheel chair bound. She has complete function of her right arm currently. She is right handed. No history of trauma to right arm.   Was seeing Dr. Blank for nephology but he no longer works at [employer].     S/p  12/6/19 (Dr Irwin)  Percutaneous thrombolysis and mechanical thrombectomy w Possis Angiojet AVX   4) #4 over-the-wire Carmel[place] thrombectomy of the arterial inflow of AV graft.        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     No MI, but history of stroke in 2016 (hemorragic)   Tobacco use: never use    Renal is Dr LING Perez    Past Medical History:   Diagnosis Date    Anemia of chronic disease 6/10/2017    Anxiety     Arthritis of right acromioclavicular joint 7/2/2014    Asthma     Bipolar disorder     Bronchitis, acute     Cataract     Cholelithiasis     Chronic diastolic CHF (congestive heart failure)     Cognitive deficits  following nontraumatic intracerebral hemorrhage 10/22/2016    Cortical cataract of both eyes 7/26/2016    Decubitus ulcer of buttock, stage 2     Degeneration of lumbar or lumbosacral intervertebral disc 3/5/2013    S/p MRI L-spine 5/2009     Depression     Encounter for blood transfusion     ESRD on hemodialysis     Started August 2018    General anesthetics causing adverse effect in therapeutic use     Hemorrhagic cerebrovascular accident (CVA)     [place] [facility] [place]    History of stroke 6/28/2017    s/p R-MCA stroke with R-putaminal hemorrhagic transformation in 8/2016 and 11/2016 [place] [facility] [place] with residual L hemiparesis, on AED s/p CVA      Hypertensive retinopathy of both eyes 7/26/2016    Impingement syndrome of right shoulder 7/2/2014    Obesity     THERESA (obstructive sleep apnea) 3/5/2013    No Home CPAP 2ndary to cost     Partial symptomatic epilepsy with complex partial seizures, not intractable, without status epilepticus     Rheumatoid arthritis(714.0)     Rotator cuff tear 7/2/2014    Sarcoidosis     Stroke 2016    left sided flaccidity, SAH    Vertebral artery stenosis 3/5/2013    S/p Stenting per Dr Burnett      Past Surgical History:   Procedure Laterality Date    BREAST SURGERY      breast reduction    COLONOSCOPY N/A 8/11/2016    Procedure: COLONOSCOPY;  Surgeon: Jerry Vilchis MD;  Location: [facility] ENDO (4TH FLR);  Service: Endoscopy;  Laterality: N/A;  Patient reports difficulty awaking from anesthesia in the past.    DECLOTTING OF VASCULAR GRAFT Right 6/20/2019    Procedure: DECLOT-GRAFT;  Surgeon: Cabrera Irwin MD;  Location: [facility] CATH LAB;  Service: Cardiology;  Laterality: Right;    DECLOTTING OF VASCULAR GRAFT Right 12/6/2019    Procedure: DECLOT-GRAFT;  Surgeon: Cabrera Irwin MD;  Location: [facility] OR 2ND FLR;  Service: Peripheral Vascular;  Laterality: Right;    FISTULOGRAM Right 2/11/2019    Procedure:  "Fistulogram;  Surgeon: Meir Valencia MD;  Location: [facility] CATH LAB;  Service: Peripheral Vascular;  Laterality: Right;    FISTULOGRAM Right 7/8/2019    Procedure: Fistulogram;  Surgeon: WELLINGTON Palm III, MD;  Location: [facility] CATH LAB;  Service: Peripheral Vascular;  Laterality: Right;    FISTULOGRAM Right 12/6/2019    Procedure: Fistulogram;  Surgeon: Cabrera Irwin MD;  [facility];  Service: Peripheral Vascular;  Laterality: Right;    HYSTERECTOMY  1999    JOSE LUIS/BSO (AUB)    PLACEMENT OF ARTERIOVENOUS GRAFT Right 10/18/2018    Procedure: AV GRAFT CREATION;  Surgeon: Meir Valencia MD;  [facility];  Service: Cardiovascular;  Laterality: Right;    ROTATOR CUFF REPAIR Right July 9, 2014    right side    Skull surgery      Aneurysm    stent placed      in vertebral artery    TOTAL REDUCTION MAMMOPLASTY      TUBAL LIGATION       Family History   Problem Relation Age of Onset    Diabetes Mother     Hypertension Mother     Heart disease Mother     Heart attack Mother     Breast cancer Mother     Stroke Sister     Hypertension Sister     Sleep apnea Sister     No Known Problems Daughter     Diabetes Son     Bell's palsy Sister     Lupus Sister     Blindness Maternal Grandmother     Diabetes Unknown         "My entire family family has diabetes"    Stroke Maternal Aunt     Colon cancer Neg Hx     Ovarian cancer Neg Hx      Social History     Socioeconomic History    Marital status:      Spouse name: Not on file    Number of children: Not on file    Years of education: Not on file    Highest education level: Not on file   Occupational History    Not on file   Social Needs    Financial resource strain: Not on file    Food insecurity:     Worry: Not on file     Inability: Not on file    Transportation needs:     Medical: Not on file     Non-medical: Not on file   Tobacco Use    Smoking status: Never Smoker    Smokeless tobacco: Never Used " "  Substance and Sexual Activity    Alcohol use: Yes     Comment: occasional wine cooler     Drug use: Never    Sexual activity: Yes     Partners: Male     Birth control/protection: Post-menopausal   Lifestyle    Physical activity:     Days per week: Not on file     Minutes per session: Not on file    Stress: Not on file   Relationships    Social connections:     Talks on phone: Not on file     Gets together: Not on file     Attends Gnosticism service: Not on file     Active member of club or organization: Not on file     Attends meetings of clubs or organizations: Not on file     Relationship status: Not on file   Other Topics Concern    Not on file   Social History Narrative    . 2 children(Wilder and Reina). Does not work. Previously worked as a .      Current Outpatient Medications on File Prior to Visit   Medication Sig    acetaminophen (TYLENOL) 325 MG tablet Take 2 tablets (650 mg total) by mouth every 6 (six) hours as needed for Pain.    albuterol (VENTOLIN HFA) 90 mcg/actuation inhaler Inhale 2 puffs into the lungs every 6 (six) hours as needed for Wheezing. Rescue    aspirin (ECOTRIN) 81 MG EC tablet Take 81 mg by mouth every morning.     atorvastatin (LIPITOR) 40 MG tablet TAKE 1 TABLET ONE TIME DAILY FOR CHOLESTEROL    BD INSULIN PEN NEEDLE UF SHORT 31 gauge x 5/16" Ndle USE TO INJECT NOVOLOG FLEXPEN BEFORE MEALS    bisacodyl (DULCOLAX) 10 mg Supp Place 1 suppository (10 mg total) rectally daily as needed.    blood sugar diagnostic Strp To check BG 4  times daily, to use with insurance preferred meter-true metrix    blood-glucose meter kit To check BG 4 times daily, to use with insurance preferred meter-true metrix    citalopram (CELEXA) 20 MG tablet Take 1 tablet (20 mg total) by mouth once daily. 1 tab daily for depression    ergocalciferol (ERGOCALCIFEROL) 50,000 unit Cap Take 1 capsule (50,000 Units total) by mouth every 7 days.    famotidine (PEPCID) 20 MG " tablet Take 1 tablet (20 mg total) by mouth once daily.    glipiZIDE (GLUCOTROL) 2.5 MG TR24 Take 1 tablet (hold if less than 150) at lunch (non dialysis days)    insulin aspart U-100 (NOVOLOG U-100 INSULIN ASPART) 100 unit/mL injection Use correction scale 180-230+1, 231-280+2, 281-330+3, 331-380+4, >380+5, max 15 units.    insulin glargine (LANTUS U-100 INSULIN) 100 unit/mL injection Inject 10-12 Units into the skin every evening.    lancets Misc 1 lancet by Misc.(Non-Drug; Combo Route) route 2 (two) times daily with meals. Check glucose before meals and bed    levETIRAcetam (KEPPRA) 1000 MG tablet Take 1 tablet (1,000 mg total) by mouth once daily.    levETIRAcetam (KEPPRA) 500 MG Tab Take 1 tablet (500 mg total) by mouth every Mon, Wed, Fri. Monday Wednesday and Friday after DIALYSIS take additional 500mg    lidocaine (LIDODERM) 5 % Place 1 patch onto the skin once daily. Remove & Discard patch within 12 hours or as directed by MD    LORazepam (ATIVAN) 0.5 MG tablet 1 tab 1 hour prior to Dialysis    ondansetron (ZOFRAN-ODT) 4 MG TbDL Take 1 tablet (4 mg total) by mouth every 8 (eight) hours as needed.    polyethylene glycol (MIRALAX) 17 gram/dose powder Take 17 g by mouth 2 (two) times daily.    pregabalin (LYRICA) 25 MG capsule Take 1 capsule (25 mg total) by mouth once daily. May take additional dose after HD.    sevelamer carbonate (RENVELA) 800 mg Tab Take 800 mg by mouth 3 (three) times daily with meals.    FLUoxetine 10 MG capsule Take 10 mg by mouth once daily.    FLUoxetine 10 MG Tab Take 1 tablet (10 mg total) by mouth once daily. (Patient not taking: Reported on 12/20/2019)    folic acid (FOLVITE) 1 MG tablet Take 1 tablet (1 mg total) by mouth once daily.     No current facility-administered medications on file prior to visit.        REVIEW OF SYSTEMS:  General: negative; ENT: negative; Allergy and Immunology: negative; Hematological and Lymphatic: Negative; Endocrine: negative;  Respiratory: no cough, shortness of breath, or wheezing; Cardiovascular: no chest pain or dyspnea on exertion; Gastrointestinal: no abdominal pain/back, change in bowel habits, or bloody stools; Genito-Urinary: no dysuria, trouble voiding, or hematuria; Musculoskeletal: negative  Neurological: no TIA or stroke symptoms    PHYSICAL EXAM:   Left Arm BP - Sittin/63 (19 1353)   Vitals:    19 1352   BP: (!) 140/63   Pulse: 72   Temp: 98.6 °F (37 °C)       General appearance:  Alert, well-appearing, and in no distress.  Oriented to person, place, and time   Neurological: Normal speech, no focal findings noted; CN II - XII grossly intact           Musculoskeletal: Digits/nail without cyanosis/clubbing.  Normal muscle strength/tone.                 Neck: Supple, no significant adenopathy; thyroid is not enlarged                  No carotid bruit can be auscultated                Chest:  Clear to auscultation, no wheezes, rales or rhonchi, symmetric air entry right permacath     No use of accessory muscles             Cardiac: Normal rate and regular rhythm, S1 and S2 normal; PMI non-displaced          Abdomen: Soft, nontender, nondistended, no masses or organomegaly     No rebound tenderness noted; bowel sounds normal     Pulsatile aortic mass is no palpable.     No groin adenopathy      Extremities:   RUE VAG with soft thrill - no pulsatility     Non-palpable R radial pulse (pre-op 2+ R radial pulse)     Positive Davidson's test        R hand - motor/sensitive intact    LAB RESULTS:  Lab Results   Component Value Date    K 3.6 2019    K 3.8 2019    K 3.5 2019    K 3.5 2019    CREATININE 7.7 (H) 2019    CREATININE 5.5 (H) 2019    CREATININE 8.4 (H) 2019     Lab Results   Component Value Date    WBC 5.27 2019    WBC 6.07 2019    WBC 8.35 2019    HCT 24.1 (L) 2019    HCT 22.5 (L) 2019    HCT 25.0 (L) 2019     (L) 2019      12/08/2019     12/07/2019     Lab Results   Component Value Date    HGBA1C 7.8 (H) 12/04/2019    HGBA1C 6.8 (H) 08/29/2019    HGBA1C 7.9 (H) 07/11/2019     IMAGING:  Previous:  AVG u/s: flow vol 1630 ml/min (prior 1866 ml/min; 869 ml/min)  No stenosis --- *PSVs 503 cm/s in mid-graft    Previous:  + outflow stenosis w  cm/s    Vein mapping:  Right- cephalic medial forearm 4.1, basilic antecubital 6.3  Left- cpehalic vein 2.0, antecubital fossa 3.6    IMP/PLAN:  59 y.o. 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

## 2019-12-23 ENCOUNTER — PATIENT OUTREACH (OUTPATIENT)
Dept: ADMINISTRATIVE | Facility: OTHER | Age: 59
End: 2019-12-23

## 2019-12-23 RX ORDER — FLUOXETINE 10 MG/1
10 CAPSULE ORAL DAILY
Qty: 90 CAPSULE | Refills: 0 | Status: SHIPPED | OUTPATIENT
Start: 2019-12-23 | End: 2020-03-22 | Stop reason: SDUPTHER

## 2019-12-23 NOTE — TELEPHONE ENCOUNTER
"Spoke with Mrs Ayala who is feeling "spacey" on the celexa but has done ok on prozac in the past.  I will D/C celexa/citaloprim and restart fluoxetine 10mg daily to start.  Spoke with Reina and she will STOP Citaloprim as above/restart Fluoxetine. Also spoke with pharmacist to clarify: Fluoxetine 10mg cap called in.  Isaac Mrs Ayala need to R/S her 1/7 appt as she now dialyzes on Tues/Thurs/Saturdays.  Can you R/S to a Wed or Friday? Is one of our cancels on a Wed or Friday?  "

## 2019-12-24 ENCOUNTER — OFFICE VISIT (OUTPATIENT)
Dept: INTERNAL MEDICINE | Facility: CLINIC | Age: 59
End: 2019-12-24
Payer: MEDICARE

## 2019-12-24 VITALS
SYSTOLIC BLOOD PRESSURE: 128 MMHG | HEIGHT: 63 IN | DIASTOLIC BLOOD PRESSURE: 64 MMHG | BODY MASS INDEX: 34.55 KG/M2 | WEIGHT: 195 LBS

## 2019-12-24 DIAGNOSIS — Z99.2 ESRD (END STAGE RENAL DISEASE) ON DIALYSIS: ICD-10-CM

## 2019-12-24 DIAGNOSIS — G47.33 OSA (OBSTRUCTIVE SLEEP APNEA): ICD-10-CM

## 2019-12-24 DIAGNOSIS — Z79.4 TYPE 2 DIABETES MELLITUS WITH CHRONIC KIDNEY DISEASE ON CHRONIC DIALYSIS, WITH LONG-TERM CURRENT USE OF INSULIN: Primary | ICD-10-CM

## 2019-12-24 DIAGNOSIS — E55.9 VITAMIN D DEFICIENCY: ICD-10-CM

## 2019-12-24 DIAGNOSIS — N18.6 TYPE 2 DIABETES MELLITUS WITH CHRONIC KIDNEY DISEASE ON CHRONIC DIALYSIS, WITH LONG-TERM CURRENT USE OF INSULIN: Primary | ICD-10-CM

## 2019-12-24 DIAGNOSIS — N18.6 ESRD (END STAGE RENAL DISEASE) ON DIALYSIS: ICD-10-CM

## 2019-12-24 DIAGNOSIS — Z86.73 HISTORY OF STROKE: ICD-10-CM

## 2019-12-24 DIAGNOSIS — F41.8 MIXED ANXIETY AND DEPRESSIVE DISORDER: ICD-10-CM

## 2019-12-24 DIAGNOSIS — E78.2 MIXED HYPERLIPIDEMIA: ICD-10-CM

## 2019-12-24 DIAGNOSIS — I69.359 HEMIPLEGIA AS LATE EFFECT OF STROKE: ICD-10-CM

## 2019-12-24 DIAGNOSIS — I10 ESSENTIAL HYPERTENSION: ICD-10-CM

## 2019-12-24 DIAGNOSIS — M06.9 RHEUMATOID ARTHRITIS INVOLVING MULTIPLE SITES, UNSPECIFIED RHEUMATOID FACTOR PRESENCE: ICD-10-CM

## 2019-12-24 DIAGNOSIS — Z99.2 TYPE 2 DIABETES MELLITUS WITH CHRONIC KIDNEY DISEASE ON CHRONIC DIALYSIS, WITH LONG-TERM CURRENT USE OF INSULIN: Primary | ICD-10-CM

## 2019-12-24 DIAGNOSIS — D86.9 SARCOIDOSIS: Chronic | ICD-10-CM

## 2019-12-24 DIAGNOSIS — E11.22 TYPE 2 DIABETES MELLITUS WITH CHRONIC KIDNEY DISEASE ON CHRONIC DIALYSIS, WITH LONG-TERM CURRENT USE OF INSULIN: Primary | ICD-10-CM

## 2019-12-24 PROCEDURE — 99999 PR PBB SHADOW E&M-EST. PATIENT-LVL III: CPT | Mod: PBBFAC,HCNC,, | Performed by: NURSE PRACTITIONER

## 2019-12-24 PROCEDURE — 3008F PR BODY MASS INDEX (BMI) DOCUMENTED: ICD-10-PCS | Mod: HCNC,CPTII,S$GLB, | Performed by: NURSE PRACTITIONER

## 2019-12-24 PROCEDURE — 99214 OFFICE O/P EST MOD 30 MIN: CPT | Mod: HCNC,S$GLB,, | Performed by: NURSE PRACTITIONER

## 2019-12-24 PROCEDURE — 99999 PR PBB SHADOW E&M-EST. PATIENT-LVL III: ICD-10-PCS | Mod: PBBFAC,HCNC,, | Performed by: NURSE PRACTITIONER

## 2019-12-24 PROCEDURE — 3008F BODY MASS INDEX DOCD: CPT | Mod: HCNC,CPTII,S$GLB, | Performed by: NURSE PRACTITIONER

## 2019-12-24 PROCEDURE — 99214 PR OFFICE/OUTPT VISIT, EST, LEVL IV, 30-39 MIN: ICD-10-PCS | Mod: HCNC,S$GLB,, | Performed by: NURSE PRACTITIONER

## 2019-12-24 RX ORDER — INSULIN GLARGINE 100 [IU]/ML
INJECTION, SOLUTION SUBCUTANEOUS
Qty: 6 ML | Refills: 6
Start: 2019-12-24 | End: 2020-02-10 | Stop reason: SDUPTHER

## 2019-12-24 RX ORDER — PEN NEEDLE, DIABETIC 30 GX3/16"
NEEDLE, DISPOSABLE MISCELLANEOUS
Qty: 100 EACH | Refills: 3 | Status: SHIPPED | OUTPATIENT
Start: 2019-12-24

## 2019-12-24 RX ORDER — LANCETS 33 GAUGE
EACH MISCELLANEOUS
COMMUNITY
Start: 2019-10-02

## 2019-12-24 RX ORDER — INSULIN GLARGINE 100 [IU]/ML
INJECTION, SOLUTION SUBCUTANEOUS
Qty: 6 ML | Refills: 6 | Status: SHIPPED | OUTPATIENT
Start: 2019-12-24 | End: 2020-04-24

## 2019-12-24 NOTE — PROGRESS NOTES
CHIEF COMPLAINT: Type 2 Diabetes     HPI: Mrs. Lucy Perez is a 59 y.o. female who was diagnosed with Type 2 DM in 1990s.  Seen by JOHANNA Aguilera DNP in 2016.  Being seen by me for the second time, last seen in 9/2019.  Has extensive history, ESRD, R-MCA, (L) hemiparesis,  HTN.  Needs in and out catheter, makes small amount of urine.  Arrived in w/c with family member.  Has interest in freestyle lev-does not have lev.     Admission on 12/4/19 x 1 week for hyperkalemia, access issues for dialysis  Currently on schedule Tuesday, Thursday and saturdays now-for dialysis     Lab Results   Component Value Date    HGBA1C 7.8 (H) 12/04/2019        PREVIOUS DIABETES MEDICATIONS TRIED  novolog  humalog  lantus  Metformin  glipizide    CURRENT DIABETIC MEDS: lantus 12 units at night, novolog correction scale 180-230+1, etc, glipizide 2.5 mg XR w/ breakfast, hold if < 150 mg/dl   BG monitoring 4 times a day  Injections 4 times a day.  Wide fluctuations 115-250 at times   Yesterday--156   H/o hypoglycemia  H/o severe hypoglycemia 30s-40s     Pt is monitoring blood glucose readings 4 times a day.  Needs >100 strips per month related to fluctuations with blood glucose reading, a1c trends, and activity level.    See above     Diabetes Management Status    Statin: Taking  ACE/ARB: Not taking    Screening or Prevention Patient's value Goal Complete/Controlled?   HgA1C Testing and Control   Lab Results   Component Value Date    HGBA1C 7.8 (H) 12/04/2019      Annually/Less than 8% Yes   Lipid profile : 08/29/2019 Annually Yes   LDL control Lab Results   Component Value Date    LDLCALC 98.2 08/29/2019    Annually/Less than 100 mg/dl  Yes   Nephropathy screening Lab Results   Component Value Date    LABMICR 2395.0 11/11/2014     Lab Results   Component Value Date    PROTEINUA 2+ (A) 10/07/2019    Annually Yes   Blood pressure BP Readings from Last 1 Encounters:   12/24/19 128/64    Less than 140/90 Yes   Dilated retinal exam  ": 09/24/2019 Annually No   Foot exam   : 09/17/2019 Annually Yes   9/24/19 Dr. Blanton for eyes    REVIEW OF SYSTEMS  General: +(L) weakness, fatigue, +weight changes 8# gain over the past 2 mos  Eyes: no double or blurred vision, eye pain, or redness  Cardiovascular: no chest pain, palpitations, edema, or murmurs.   Respiratory: no cough or dyspnea.   GI: no heartburn, nausea, or changes in bowel patterns; good appetite.   Skin: no rashes, dryness, itching, or reactions at insulin injection sites.  Neuro:+ numbness, tingling (L), tremors, or vertigo.   Endocrine: no polyuria, polydipsia, polyphagia, heat or cold intolerance.      Vital Signs  /64   Ht 5' 3" (1.6 m)   Wt 88.5 kg (195 lb)   LMP  (LMP Unknown)   BMI 34.54 kg/m²     Hemoglobin A1C   Date Value Ref Range Status   12/04/2019 7.8 (H) 4.0 - 5.6 % Final     Comment:     ADA Screening Guidelines:  5.7-6.4%  Consistent with prediabetes  >or=6.5%  Consistent with diabetes  High levels of fetal hemoglobin interfere with the HbA1C  assay. Heterozygous hemoglobin variants (HbS, HgC, etc)do  not significantly interfere with this assay.   However, presence of multiple variants may affect accuracy.     08/29/2019 6.8 (H) 4.0 - 5.6 % Final     Comment:     ADA Screening Guidelines:  5.7-6.4%  Consistent with prediabetes  >or=6.5%  Consistent with diabetes  High levels of fetal hemoglobin interfere with the HbA1C  assay. Heterozygous hemoglobin variants (HbS, HgC, etc)do  not significantly interfere with this assay.   However, presence of multiple variants may affect accuracy.     07/11/2019 7.9 (H) 4.0 - 5.6 % Final     Comment:     ADA Screening Guidelines:  5.7-6.4%  Consistent with prediabetes  >or=6.5%  Consistent with diabetes  High levels of fetal hemoglobin interfere with the HbA1C  assay. Heterozygous hemoglobin variants (HbS, HgC, etc)do  not significantly interfere with this assay.   However, presence of multiple variants may affect accuracy.      "     Chemistry        Component Value Date/Time     12/09/2019 0637    K 3.6 12/09/2019 0637     12/09/2019 0637    CO2 25 12/09/2019 0637    BUN 51 (H) 12/09/2019 0637    CREATININE 7.7 (H) 12/09/2019 0637     (H) 12/09/2019 0637        Component Value Date/Time    CALCIUM 8.3 (L) 12/09/2019 0637    ALKPHOS 97 12/04/2019 1253    AST 7 (L) 12/04/2019 1253    ALT 7 (L) 12/04/2019 1253    BILITOT 0.4 12/04/2019 1253    ESTGFRAFRICA 6.0 (A) 12/09/2019 0637    EGFRNONAA 5.2 (A) 12/09/2019 0637           Lab Results   Component Value Date    TSH 0.522 10/07/2019      Lab Results   Component Value Date    CHOL 200 (H) 08/29/2019    CHOL 262 (H) 04/11/2019    CHOL 190 01/15/2019     Lab Results   Component Value Date    HDL 85 (H) 08/29/2019    HDL 62 04/11/2019    HDL 57 01/15/2019     Lab Results   Component Value Date    LDLCALC 98.2 08/29/2019    LDLCALC 179.8 (H) 04/11/2019    LDLCALC 113.4 01/15/2019     Lab Results   Component Value Date    TRIG 84 08/29/2019    TRIG 101 04/11/2019    TRIG 98 01/15/2019     Lab Results   Component Value Date    CHOLHDL 42.5 08/29/2019    CHOLHDL 23.7 04/11/2019    CHOLHDL 30.0 01/15/2019         PHYSICAL EXAMINATION  Constitutional: chronically ill appearing  Neck: Supple, trachea midline.   Respiratory: No wheezes, even and unlabored.  Cardiovascular: RRR; no edema.   Lymph: deferred  Skin: warm and dry; no injection site reactions, +acanthosis nigracans observed. (R) fistula +/+  Neuro:patient alert and cooperative, normal affect; in w/c, (L) hemiparesis    Diabetes Foot Exam: deferred done 9/3/19    Assessment/Plan  1. Type 2 diabetes mellitus with chronic kidney disease on chronic dialysis, with long-term current use of insulin  Hemoglobin A1c   2. ESRD (end stage renal disease) on dialysis     3. Vitamin D deficiency     4. Rheumatoid arthritis involving multiple sites, unspecified rheumatoid factor presence     5. Sarcoidosis     6. THERESA (obstructive sleep  apnea)     7. Mixed anxiety and depressive disorder     8. LEFT Hemiplegia as late effect of stroke     9. Mixed hyperlipidemia     10. Essential hypertension     11. History of stroke         1. F/u in 3 mos w/ me  Increase lantus to 14 units at night  Continue glipizide 2.5 mg xr w/ breakfast  a1c goal 7.5-8%  Discussed trends, dietary habits   Send rx for solostar, pen needles [facility] for flexpen-be easier to self admin     2. F/u with dialysis center t/th/sat  3. On supplement  4. May increase insulin resistance  5. F/u with rheumatology   6, not on cpap  Needs sleep study via pcp  7. On med(s), f/u with pcp  8. F/u with neuro  Affects activities, adls, may increase insulin resistance  9.   Lab Results   Component Value Date    LDLCALC 98.2 08/29/2019     At goal  10. Controlled, continue med(s)  11. See above, needs caretaker/family member present during visits.   FOLLOW UP  Follow up in about 3 months (around 3/24/2020).

## 2019-12-24 NOTE — TELEPHONE ENCOUNTER
That's fine-please book the March appt with 1 week prior fasting lab if possible; I placed lab orders.  Thanks

## 2019-12-24 NOTE — PATIENT INSTRUCTIONS
Snacks can be an important part of a balanced, healthy meal plan. They allow you to eat more frequently, feeling full and satisfied throughout the day. Also, they allow you to spread carbohydrates evenly, which may stabilize blood sugars.  Plus, snacks are enjoyable!     The amount of carbohydrate needed at snacks varies. Generally, about 15-30 grams of carbohydrate per snack is recommended.  Below you will find some tasty treats.       0-5 gm carb   Crystal Light   Vitamin Water Zero   Herbal tea, unsweetened   2 tsp peanut butter on celery   1./2 cup sugar-free jell-o   1 sugar-free popsicle   ¼ cup blueberries   8oz Blue Chika unsweetened almond milk   5 baby carrots & celery sticks, cucumbers, bell peppers dipped in ¼ cup salsa, 2Tbsp light ranch dressing or 2Tbsp plain Greek yogurt   10 Goldfish crackers   ½ oz low-fat cheese or string cheese   1 closed handful of nuts, unsalted   1 Tbsp of sunflower seeds, unsalted   1 cup Smart Pop popcorn   1 whole grain brown rice cake        15 gm carb   1 small piece of fruit or ½ banana or 1/2 cup lite canned fruit   3 larissa cracker squares   3 cups Smart Pop popcorn, top spray butter, Sarmiento lite salt or cinnamon and Truvia   5 Vanilla Wafers   ½ cup low fat, no added sugar ice cream or frozen yogurt (Blue bell, Blue Bunny, Weight Watchers, Skinny Cow)   ½ turkey, ham, or chicken sandwich   ½ c fruit with ½ c Cottage cheese   4-6 unsalted wheat crackers with 1 oz low fat cheese or 1 tbsp peanut butter    30-45 goldfish crackers (depending on flavor)    7-8 Mormonism mini brown rice cakes (caramel, apple cinnamon, chocolate)    12 Mormonism mini brown rice cakes (cheddar, bbq, ranch)    1/3 cup hummus dip with raw veg   1/2 whole wheat triston, 1Tbsp hummus   Mini Pizza (1/2 whole wheat English muffin, low-fat  cheese, tomato sauce)   100 calorie snack pack (Oreo, Chips Ahoy, Ritz Mix, Baked Cheetos)   4-6 oz. light or Greek Style yogurt  ([place], [place], [facility], [facility])   ½ cup sugar-free pudding     6 in. wheat tortilla or triston oven toasted chips (topped with spray butter flavoring, cinnamon, Truvia OR spray butter, garlic powder, chili powder)    18 BBQ Popchips (available at [employer], [employer], Fresh Market)

## 2019-12-31 NOTE — PHYSICIAN QUERY
PT Name: Lucy Perez  MR #: [id number]     Physician Query Form - Documentation Clarification      CDS/: Keerthi Baldwin RN CCDS             Contact information:[email or web address]    This form is a permanent document in the medical record.     Query Date: December 31, 2019    By submitting this query, we are merely seeking further clarification of documentation. Please utilize your independent clinical judgment when addressing the question(s) below.    The Medical record reflects the following:    Supporting Clinical Findings Location in Medical Record     Hyperkalemia   ESRD TTHS (last HD 11/30)   Anemia in ESRD     Acute on chronic HFpEF   - HDS on admission, stable on RA     - Labs remarkable for mild hyperkalemia 5.8, phos 7.7   - Nephrology consulted for maintenance HD     History of diastolic CHF       H&P-DS   She exhibits no edema     Pulmonary/Chest: Effort normal and breath sounds normal. No respiratory distress. She has no wheezes.     Oxygenating well on RA with no complaints of SOB.       ER, H&P-DS    ER, H&P-DS        ER,  PN 12/5                                                                            Based on the clinical indicators please clarify the acuity of the Diastolic CHF.  Thank you.    Provider Use Only      [   X ] Chronic diastolic CHF    [    ] Acute on chronic diastolic CHF    [    ] Other________________________________                                                                                                                 [  ] Clinically Undetermined

## 2020-01-06 DIAGNOSIS — N18.6 ESRD (END STAGE RENAL DISEASE) ON DIALYSIS: Primary | ICD-10-CM

## 2020-01-06 DIAGNOSIS — Z99.2 ESRD (END STAGE RENAL DISEASE) ON DIALYSIS: Primary | ICD-10-CM

## 2020-01-08 ENCOUNTER — TELEPHONE (OUTPATIENT)
Dept: INTERNAL MEDICINE | Facility: CLINIC | Age: 60
End: 2020-01-08

## 2020-01-08 DIAGNOSIS — G47.33 OSA (OBSTRUCTIVE SLEEP APNEA): Primary | ICD-10-CM

## 2020-01-08 NOTE — TELEPHONE ENCOUNTER
Pt has hx THERESA with last CPAP study 2015 and pt s/p Cerebral aneurysm/CVA in 2016.  She has no been on CPAP since before the CVA but feels she may need to restart such.  I have recommended a CPAP Titration study to help guide CPAP treatment.

## 2020-01-10 ENCOUNTER — TELEPHONE (OUTPATIENT)
Dept: SLEEP MEDICINE | Facility: OTHER | Age: 60
End: 2020-01-10

## 2020-01-10 ENCOUNTER — PATIENT OUTREACH (OUTPATIENT)
Dept: ADMINISTRATIVE | Facility: OTHER | Age: 60
End: 2020-01-10

## 2020-01-14 ENCOUNTER — OFFICE VISIT (OUTPATIENT)
Dept: SURGERY | Facility: CLINIC | Age: 60
End: 2020-01-14
Payer: MEDICARE

## 2020-01-14 VITALS
BODY MASS INDEX: 34.55 KG/M2 | HEIGHT: 63 IN | DIASTOLIC BLOOD PRESSURE: 81 MMHG | SYSTOLIC BLOOD PRESSURE: 137 MMHG | HEART RATE: 77 BPM | WEIGHT: 195 LBS

## 2020-01-14 DIAGNOSIS — D63.1 ANEMIA IN ESRD (END-STAGE RENAL DISEASE): ICD-10-CM

## 2020-01-14 DIAGNOSIS — G47.33 OSA (OBSTRUCTIVE SLEEP APNEA): ICD-10-CM

## 2020-01-14 DIAGNOSIS — E78.2 MIXED HYPERLIPIDEMIA: ICD-10-CM

## 2020-01-14 DIAGNOSIS — N18.6 ESRD (END STAGE RENAL DISEASE): Primary | ICD-10-CM

## 2020-01-14 DIAGNOSIS — Z79.4 TYPE 2 DIABETES MELLITUS WITH CHRONIC KIDNEY DISEASE ON CHRONIC DIALYSIS, WITH LONG-TERM CURRENT USE OF INSULIN: ICD-10-CM

## 2020-01-14 DIAGNOSIS — D86.9 SARCOIDOSIS: ICD-10-CM

## 2020-01-14 DIAGNOSIS — N18.6 TYPE 2 DIABETES MELLITUS WITH CHRONIC KIDNEY DISEASE ON CHRONIC DIALYSIS, WITH LONG-TERM CURRENT USE OF INSULIN: ICD-10-CM

## 2020-01-14 DIAGNOSIS — I27.20 PULMONARY HYPERTENSION: ICD-10-CM

## 2020-01-14 DIAGNOSIS — N18.6 ANEMIA IN ESRD (END-STAGE RENAL DISEASE): ICD-10-CM

## 2020-01-14 DIAGNOSIS — I70.0 ATHEROSCLEROSIS OF AORTA: ICD-10-CM

## 2020-01-14 DIAGNOSIS — Z78.9 PROBLEM WITH VASCULAR ACCESS: ICD-10-CM

## 2020-01-14 DIAGNOSIS — E11.22 TYPE 2 DIABETES MELLITUS WITH CHRONIC KIDNEY DISEASE ON CHRONIC DIALYSIS, WITH LONG-TERM CURRENT USE OF INSULIN: ICD-10-CM

## 2020-01-14 DIAGNOSIS — I10 ESSENTIAL HYPERTENSION: ICD-10-CM

## 2020-01-14 DIAGNOSIS — Z99.2 TYPE 2 DIABETES MELLITUS WITH CHRONIC KIDNEY DISEASE ON CHRONIC DIALYSIS, WITH LONG-TERM CURRENT USE OF INSULIN: ICD-10-CM

## 2020-01-14 DIAGNOSIS — I50.33 ACUTE ON CHRONIC DIASTOLIC (CONGESTIVE) HEART FAILURE: ICD-10-CM

## 2020-01-14 DIAGNOSIS — F41.8 MIXED ANXIETY AND DEPRESSIVE DISORDER: ICD-10-CM

## 2020-01-14 DIAGNOSIS — I69.359 HEMIPLEGIA AS LATE EFFECT OF STROKE: ICD-10-CM

## 2020-01-14 DIAGNOSIS — G40.209 PARTIAL SYMPTOMATIC EPILEPSY WITH COMPLEX PARTIAL SEIZURES, NOT INTRACTABLE, WITHOUT STATUS EPILEPTICUS: ICD-10-CM

## 2020-01-14 PROCEDURE — 99999 PR PBB SHADOW E&M-EST. PATIENT-LVL III: ICD-10-PCS | Mod: PBBFAC,HCNC,TXP, | Performed by: SURGERY

## 2020-01-14 PROCEDURE — 3008F BODY MASS INDEX DOCD: CPT | Mod: HCNC,CPTII,NTX,S$GLB | Performed by: SURGERY

## 2020-01-14 PROCEDURE — 99214 PR OFFICE/OUTPT VISIT, EST, LEVL IV, 30-39 MIN: ICD-10-PCS | Mod: HCNC,NTX,S$GLB, | Performed by: SURGERY

## 2020-01-14 PROCEDURE — 3008F PR BODY MASS INDEX (BMI) DOCUMENTED: ICD-10-PCS | Mod: HCNC,CPTII,NTX,S$GLB | Performed by: SURGERY

## 2020-01-14 PROCEDURE — 99999 PR PBB SHADOW E&M-EST. PATIENT-LVL III: CPT | Mod: PBBFAC,HCNC,TXP, | Performed by: SURGERY

## 2020-01-14 PROCEDURE — 99214 OFFICE O/P EST MOD 30 MIN: CPT | Mod: HCNC,NTX,S$GLB, | Performed by: SURGERY

## 2020-01-14 NOTE — LETTER
January 16, 2020      Brandi Bower MD  [address]  [facility] [address]           [facility]  [address]  [facility] [address]  Phone: [phone]          Patient: Lucy Perez   MR Number: [id number]   YOB: 1960   Date of Visit: 1/14/2020       Dear Dr. Brandi Bower:    Thank you for referring Lucy Perez to me for evaluation. Attached you will find relevant portions of my assessment and plan of care.    If you have questions, please do not hesitate to call me. I look forward to following Lucy Perez along with you.    Sincerely,    Paige Monsalve MD    Enclosure  CC:  No Recipients    If you would like to receive this communication electronically, please contact [email or web address] or [phone] to request more information on Message[employer] Link access.    For providers and/or their staff who would like to refer a patient to [facility], please contact us through our one-stop-shop provider referral line, [facility] , at [phone].    If you feel you have received this communication in error or would no longer like to receive these types of communications, please e-mail [email or web address]

## 2020-01-16 NOTE — PROGRESS NOTES
"History & Physical    SUBJECTIVE:     History of Present Illness:  Mrs. Perez is a 59 y.o. obese wheel-chair-bound female with HTN, IDDM2, HLD, CAD, CPS, THERESA, CHF, RA, sarcoidosis, depression, anxiety, BPD, anemia of chronic disease, h/o CVA for which she has residual L hemiparesis, and ERSD currently on HD MWF via RUE AGF s/p thrombectomy/PTA x2 on Plavix who presents for evaluation for peritoneal dialysis catheter placement. She states she has a cousin on PD and would prefer the lifestyle. She has no function of her LUE. She carries most of her weight in her abdomen. Her surgical history includes PEG, tubal ligation and hysterectomy. She endorses routine constipation for which she takes Miralax with some efficacy. She occasionally strains and does have hemorrhoids. She uses a suppository weekly. Her most recent labs reveal an albumin of 2.9.     Chief Complaint   Patient presents with    Chronic Renal Failure     Eval for PD     Review of patient's allergies indicates:   Allergen Reactions    Lisinopril Other (See Comments)     Angioedema      Vicodin [hydrocodone-acetaminophen] Rash     No problem with acetaminophen      Current Outpatient Medications   Medication Sig Dispense Refill    acetaminophen (TYLENOL) 325 MG tablet Take 2 tablets (650 mg total) by mouth every 6 (six) hours as needed for Pain.  0    albuterol (VENTOLIN HFA) 90 mcg/actuation inhaler Inhale 2 puffs into the lungs every 6 (six) hours as needed for Wheezing. Rescue 18 g 0    aspirin (ECOTRIN) 81 MG EC tablet Take 81 mg by mouth every morning.       atorvastatin (LIPITOR) 40 MG tablet TAKE 1 TABLET ONE TIME DAILY FOR CHOLESTEROL 90 tablet 2    BD INSULIN PEN NEEDLE UF SHORT 31 gauge x 5/16" Ndle USE TO INJECT NOVOLOG FLEXPEN BEFORE MEALS 150 each 11    bisacodyl (DULCOLAX) 10 mg Supp Place 1 suppository (10 mg total) rectally daily as needed. 30 suppository 3    blood sugar diagnostic Strp To check BG 4  times daily, to use with " "insurance preferred meter-true metrix 400 each 3    blood-glucose meter kit To check BG 4 times daily, to use with insurance preferred meter-true metrix 1 each 1    clopidogrel (PLAVIX) 75 mg tablet Take 1 tablet (75 mg total) by mouth once daily. 120 tablet 6    ergocalciferol (ERGOCALCIFEROL) 50,000 unit Cap Take 1 capsule (50,000 Units total) by mouth every 7 days.      famotidine (PEPCID) 20 MG tablet Take 1 tablet (20 mg total) by mouth once daily. 90 tablet 2    FLUoxetine 10 MG capsule Take 1 capsule (10 mg total) by mouth once daily. 90 capsule 0    folic acid (FOLVITE) 1 MG tablet Take 1 tablet (1 mg total) by mouth once daily. 90 tablet 2    glipiZIDE (GLUCOTROL) 2.5 MG TR24 Take 1 tablet (hold if less than 150) at lunch (non dialysis days) 30 tablet 4    insulin (LANTUS SOLOSTAR U-100 INSULIN) glargine 100 units/mL (3mL) SubQ pen Inject 14 units at night, cut back to 12 units if sugar is < 120. 6 mL 6    insulin aspart U-100 (NOVOLOG U-100 INSULIN ASPART) 100 unit/mL injection Use correction scale 180-230+1, 231-280+2, 281-330+3, 331-380+4, >380+5, max 15 units. 3 vial 3    insulin glargine (LANTUS U-100 INSULIN) 100 unit/mL injection Inject 14 units at night. Cut back to 12 units if sugar is < 120. 6 mL 6    levETIRAcetam (KEPPRA) 1000 MG tablet Take 1 tablet (1,000 mg total) by mouth once daily. 90 tablet 3    levETIRAcetam (KEPPRA) 500 MG Tab Take 1 tablet (500 mg total) by mouth every Mon, Wed, Fri. Monday Wednesday and Friday after DIALYSIS take additional 500mg 36 tablet 3    lidocaine (LIDODERM) 5 % Place 1 patch onto the skin once daily. Remove & Discard patch within 12 hours or as directed by MD 10 patch 1    LORazepam (ATIVAN) 0.5 MG tablet 1 tab 1 hour prior to Dialysis 30 tablet 0    ondansetron (ZOFRAN-ODT) 4 MG TbDL Take 1 tablet (4 mg total) by mouth every 8 (eight) hours as needed. 10 tablet 0    pen needle, diabetic (BD EMI 2ND GEN PEN NEEDLE) 32 gauge x 5/32" Ndle Uses " once a day, 90 day 100 each 3    polyethylene glycol (MIRALAX) 17 gram/dose powder Take 17 g by mouth 2 (two) times daily. 1 Bottle 6    pregabalin (LYRICA) 25 MG capsule Take 1 capsule (25 mg total) by mouth once daily. May take additional dose after HD. 90 capsule 4    sevelamer carbonate (RENVELA) 800 mg Tab Take 800 mg by mouth 3 (three) times daily with meals.      TRUEPLUS LANCETS 33 gauge Misc        No current facility-administered medications for this visit.      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    Cortical cataract of both eyes 7/26/2016    Decubitus ulcer of buttock, stage 2     Degeneration of lumbar or lumbosacral intervertebral disc 3/5/2013    S/p MRI L-spine 5/2009     Depression     Encounter for blood transfusion     ESRD on hemodialysis     Started August 2018    General anesthetics causing adverse effect in therapeutic use     Hemorrhagic cerebrovascular accident (CVA)     8[place]2016 [place] [facility] [place]aresis    History of stroke 6/28/2017    s/p R-MCA stroke with R-putaminal hemorrhagic transformation in 8/2016 and 11/2016 (s/[place]iot[place] [facility] with residual L hemiparesis, on AED s/p CVA      Hypertensive retinopathy of both eyes 7/26/2016    Impingement syndrome of right shoulder 7/2/2014    Obesity     THERESA (obstructive sleep apnea) 3/5/2013    No Home CPAP 2ndary to cost     Partial symptomatic epilepsy with complex partial seizures, not intractable, without status epilepticus     Rheumatoid arthritis(714.0)     Rotator cuff tear 7/2/2014    Sarcoidosis     Stroke 2016    left sided flaccidity, SAH    Vertebral  artery stenosis 3/5/2013    S/p Stenting per Dr Burnett      Past Surgical History:   Procedure Laterality Date    BREAST SURGERY      breast reduction    COLONOSCOPY N/A 8/11/2016    Procedure: COLONOSCOPY;  Surgeon: Jerry Vilchis MD;  Location: [facility] ENDO (4TH FLR);  Service: Endoscopy;  Laterality: N/A;  Patient reports difficulty awaking from anesthesia in the past.    DECLOTTING OF VASCULAR GRAFT Right 6/20/2019    Procedure: DECLOT-GRAFT;  Surgeon: Cabrera Irwin MD;  Location: [facility] CATH LAB;  Service: Cardiology;  Laterality: Right;    DECLOTTING OF VASCULAR GRAFT Right 12/6/2019    Procedure: DECLOT-GRAFT;  Surgeon: Cabrera Irwin MD;  Location: [facility] OR 2ND FLR;  Service: Peripheral Vascular;  Laterality: Right;    FISTULOGRAM Right 2/11/2019    Procedure: Fistulogram;  Surgeon: Meir Valencia MD;  Location: [facility] CATH LAB;  Service: Peripheral Vascular;  Laterality: Right;    FISTULOGRAM Right 7/8/2019    Procedure: Fistulogram;  Surgeon: WELLINGTON Palm III, MD;  Location: [facility] CATH LAB;  Service: Peripheral Vascular;  Laterality: Right;    FISTULOGRAM Right 12/6/2019    Procedure: Fistulogram;  Surgeon: Cabrera Irwin MD;  Location: [facility] OR 2ND FLR;  Service: Peripheral Vascular;  Laterality: Right;    HYSTERECTOMY  1999    JOSE LUIS/BSO ([facility]B)    PLACEMENT OF ARTERIOVENOUS GRAFT Right 10/18/2018    Procedure: AV GRAFT CREATION;  Surgeon: Meir Valencia MD;  Location: [facility] OR 2ND FLR;  Service: Cardiovascular;  Laterality: Right;    ROTATOR CUFF REPAIR Right July 9, 2014    right side    Skull surgery      Aneurysm    stent placed      in vertebral artery    TOTAL REDUCTION MAMMOPLASTY      TUBAL LIGATION       Family History   Problem Relation Age of Onset    Diabetes Mother     Hypertension Mother     Heart disease Mother     Heart attack Mother     Breast cancer Mother     Stroke Sister     Hypertension Sister     Sleep apnea Sister     No Known Problems Daughter   "   Diabetes Son     Bell's palsy Sister     Lupus Sister     Blindness Maternal Grandmother     Diabetes Unknown         "My entire family family has diabetes"    Stroke Maternal Aunt     Colon cancer Neg Hx     Ovarian cancer Neg Hx      Social History     Tobacco Use    Smoking status: Never Smoker    Smokeless tobacco: Never Used   Substance Use Topics    Alcohol use: Yes     Comment: occasional wine cooler     Drug use: Never      Review of Systems:  Review of Systems   Constitutional: Positive for activity change (wheel-chair bound, L hemiparesis). Negative for chills, fever and unexpected weight change.   HENT: Negative for congestion, rhinorrhea and sore throat.    Eyes: Negative for visual disturbance.   Respiratory: Negative for cough and shortness of breath.    Cardiovascular: Negative for chest pain and palpitations.   Gastrointestinal: Positive for constipation. Negative for abdominal distention, abdominal pain, nausea and vomiting.   Genitourinary: Positive for frequency. Negative for difficulty urinating.   Musculoskeletal: Positive for arthralgias and back pain. Negative for neck pain and neck stiffness.   Skin: Negative for color change, rash and wound.   Neurological: Positive for weakness (L-sided hemiparesis) and headaches.   Hematological: Negative.    Psychiatric/Behavioral: Negative.        OBJECTIVE:     Vital Signs (Most Recent)  Pulse: 77 (01/14/20 1547)  BP: 137/81 (01/14/20 1547)  5' 3" (1.6 m)  88.5 kg (195 lb)     Physical Exam:  Physical Exam   Constitutional: She is oriented to person, place, and time. She appears well-nourished. No distress.   HENT:   Head: Normocephalic and atraumatic.   Eyes: Conjunctivae are normal. No scleral icterus.   Neck: Normal range of motion. Neck supple.   Cardiovascular: Normal rate, regular rhythm and normal heart sounds.   Pulmonary/Chest: Effort normal and breath sounds normal. No respiratory distress. She has no wheezes.   Abdominal: Soft. " She exhibits no distension and no mass. There is no tenderness. There is no rebound and no guarding. No hernia.   Musculoskeletal: She exhibits edema.   Sitting in wheelchair   Neurological: She is alert and oriented to person, place, and time.   Skin: Skin is warm and dry. No rash noted. She is not diaphoretic. No erythema.   Psychiatric: She has a normal mood and affect. Her behavior is normal. Judgment and thought content normal.   Vitals reviewed.    Laboratory  Albumin: 2.9   Last HbA1c 12/4/19: 7.8%    Diagnostic Results:  Labs: Reviewed    ASSESSMENT/PLAN:   Mrs. Perez is a 59 year-old obese woman with CAD, poorly-controlled IDDM2, chronic constipation, likely overdistended bladder given h/o frequency, hypoalbuminemia, and L-hemiparesis s/p CVA on Plavix who presents for consideration of PD catheter placement. In addition to her multiple co-morbidities, she has had pelvic surgery (hysterectomy). I don't believe she is an appropriate candidate for PD at this time. I asked if she had a relationship with a Nephrologist and she states that currently she does not. I encourage her to do so and work to improve what is modifiable, such as her weight, glycemic control, constipation, and protein nutrition to an albumin of >3.4. Even after this, given her body habitus, history of pelvic surgery, and inability to care for her catheter by herself 2/2 hemiparesis, she is at high risk for failure and infection. All of this was discussed with her and her , who is her primary caretaker. All questions were answered to her satisfaction and they expressed understanding. They will follow-up prn. It was a pleasure meeting Mrs. Perez and thank you for this consultation.    Paige Monsalve  1/14/20

## 2020-01-18 ENCOUNTER — HOSPITAL ENCOUNTER (OUTPATIENT)
Facility: HOSPITAL | Age: 60
Discharge: HOME | End: 2020-01-20
Attending: EMERGENCY MEDICINE | Admitting: INTERNAL MEDICINE
Payer: MEDICARE

## 2020-01-18 DIAGNOSIS — N18.6 ESRD (END STAGE RENAL DISEASE) ON DIALYSIS: ICD-10-CM

## 2020-01-18 DIAGNOSIS — Z99.2 ESRD (END STAGE RENAL DISEASE) ON DIALYSIS: ICD-10-CM

## 2020-01-18 DIAGNOSIS — Z78.9 PROBLEM WITH VASCULAR ACCESS: Primary | ICD-10-CM

## 2020-01-18 PROBLEM — E87.5 HYPERKALEMIA: Status: RESOLVED | Noted: 2019-12-04 | Resolved: 2020-01-18

## 2020-01-18 PROBLEM — I16.0 HYPERTENSIVE URGENCY: Status: RESOLVED | Noted: 2018-08-10 | Resolved: 2020-01-18

## 2020-01-18 LAB
ALBUMIN SERPL BCP-MCNC: 3.6 G/DL (ref 3.5–5.2)
ALP SERPL-CCNC: 115 U/L (ref 55–135)
ALT SERPL W/O P-5'-P-CCNC: 6 U/L (ref 10–44)
ANION GAP SERPL CALC-SCNC: 14 MMOL/L (ref 8–16)
APTT BLDCRRT: 23.7 SEC (ref 21–32)
AST SERPL-CCNC: 11 U/L (ref 10–40)
BASOPHILS # BLD AUTO: 0.04 K/UL (ref 0–0.2)
BASOPHILS NFR BLD: 0.6 % (ref 0–1.9)
BILIRUB SERPL-MCNC: 0.3 MG/DL (ref 0.1–1)
BUN SERPL-MCNC: 51 MG/DL (ref 8–23)
CALCIUM SERPL-MCNC: 9.4 MG/DL (ref 8.7–10.5)
CHLORIDE SERPL-SCNC: 102 MMOL/L (ref 95–110)
CO2 SERPL-SCNC: 27 MMOL/L (ref 23–29)
CREAT SERPL-MCNC: 7.3 MG/DL (ref 0.5–1.4)
DIFFERENTIAL METHOD: ABNORMAL
EOSINOPHIL # BLD AUTO: 0.2 K/UL (ref 0–0.5)
EOSINOPHIL NFR BLD: 2.5 % (ref 0–8)
ERYTHROCYTE [DISTWIDTH] IN BLOOD BY AUTOMATED COUNT: 14.3 % (ref 11.5–14.5)
EST. GFR  (AFRICAN AMERICAN): 6.4 ML/MIN/1.73 M^2
EST. GFR  (NON AFRICAN AMERICAN): 5.5 ML/MIN/1.73 M^2
GLUCOSE SERPL-MCNC: 220 MG/DL (ref 70–110)
HCT VFR BLD AUTO: 33.6 % (ref 37–48.5)
HGB BLD-MCNC: 10.2 G/DL (ref 12–16)
IMM GRANULOCYTES # BLD AUTO: 0.02 K/UL (ref 0–0.04)
IMM GRANULOCYTES NFR BLD AUTO: 0.3 % (ref 0–0.5)
INR PPP: 0.9 (ref 0.8–1.2)
LYMPHOCYTES # BLD AUTO: 1.5 K/UL (ref 1–4.8)
LYMPHOCYTES NFR BLD: 23.4 % (ref 18–48)
MCH RBC QN AUTO: 32.1 PG (ref 27–31)
MCHC RBC AUTO-ENTMCNC: 30.4 G/DL (ref 32–36)
MCV RBC AUTO: 106 FL (ref 82–98)
MONOCYTES # BLD AUTO: 0.4 K/UL (ref 0.3–1)
MONOCYTES NFR BLD: 6.4 % (ref 4–15)
NEUTROPHILS # BLD AUTO: 4.2 K/UL (ref 1.8–7.7)
NEUTROPHILS NFR BLD: 66.8 % (ref 38–73)
NRBC BLD-RTO: 0 /100 WBC
PHOSPHATE SERPL-MCNC: 4.1 MG/DL (ref 2.7–4.5)
PLATELET # BLD AUTO: 177 K/UL (ref 150–350)
PMV BLD AUTO: 10.7 FL (ref 9.2–12.9)
POCT GLUCOSE: 267 MG/DL (ref 70–110)
POTASSIUM SERPL-SCNC: 4.9 MMOL/L (ref 3.5–5.1)
PROT SERPL-MCNC: 7.2 G/DL (ref 6–8.4)
PROTHROMBIN TIME: 9.5 SEC (ref 9–12.5)
RBC # BLD AUTO: 3.18 M/UL (ref 4–5.4)
SODIUM SERPL-SCNC: 143 MMOL/L (ref 136–145)
WBC # BLD AUTO: 6.36 K/UL (ref 3.9–12.7)

## 2020-01-18 PROCEDURE — 99220 PR INITIAL OBSERVATION CARE,LEVL III: CPT | Mod: HCNC,NTX,, | Performed by: PHYSICIAN ASSISTANT

## 2020-01-18 PROCEDURE — 63600175 PHARM REV CODE 636 W HCPCS: Mod: HCNC,NTX | Performed by: PHYSICIAN ASSISTANT

## 2020-01-18 PROCEDURE — C9399 UNCLASSIFIED DRUGS OR BIOLOG: HCPCS | Mod: HCNC,NTX | Performed by: PHYSICIAN ASSISTANT

## 2020-01-18 PROCEDURE — 80053 COMPREHEN METABOLIC PANEL: CPT | Mod: HCNC,NTX

## 2020-01-18 PROCEDURE — 85025 COMPLETE CBC W/AUTO DIFF WBC: CPT | Mod: HCNC,NTX

## 2020-01-18 PROCEDURE — 96372 THER/PROPH/DIAG INJ SC/IM: CPT | Mod: NTX | Performed by: EMERGENCY MEDICINE

## 2020-01-18 PROCEDURE — G0378 HOSPITAL OBSERVATION PER HR: HCPCS | Mod: HCNC,NTX

## 2020-01-18 PROCEDURE — 99285 EMERGENCY DEPT VISIT HI MDM: CPT | Mod: HCNC,NTX,, | Performed by: EMERGENCY MEDICINE

## 2020-01-18 PROCEDURE — 85730 THROMBOPLASTIN TIME PARTIAL: CPT | Mod: HCNC,NTX

## 2020-01-18 PROCEDURE — 99285 EMERGENCY DEPT VISIT HI MDM: CPT | Mod: 25,HCNC,NTX

## 2020-01-18 PROCEDURE — 99220 PR INITIAL OBSERVATION CARE,LEVL III: ICD-10-PCS | Mod: HCNC,NTX,, | Performed by: PHYSICIAN ASSISTANT

## 2020-01-18 PROCEDURE — 85610 PROTHROMBIN TIME: CPT | Mod: HCNC,NTX

## 2020-01-18 PROCEDURE — 25000003 PHARM REV CODE 250: Mod: HCNC,NTX | Performed by: PHYSICIAN ASSISTANT

## 2020-01-18 PROCEDURE — 84100 ASSAY OF PHOSPHORUS: CPT | Mod: HCNC,NTX

## 2020-01-18 PROCEDURE — 99285 PR EMERGENCY DEPT VISIT,LEVEL V: ICD-10-PCS | Mod: HCNC,NTX,, | Performed by: EMERGENCY MEDICINE

## 2020-01-18 RX ORDER — GLUCAGON 1 MG
1 KIT INJECTION
Status: DISCONTINUED | OUTPATIENT
Start: 2020-01-18 | End: 2020-01-20 | Stop reason: HOSPADM

## 2020-01-18 RX ORDER — INSULIN ASPART 100 [IU]/ML
0-5 INJECTION, SOLUTION INTRAVENOUS; SUBCUTANEOUS
Status: DISCONTINUED | OUTPATIENT
Start: 2020-01-18 | End: 2020-01-20 | Stop reason: HOSPADM

## 2020-01-18 RX ORDER — TALC
6 POWDER (GRAM) TOPICAL NIGHTLY PRN
Status: DISCONTINUED | OUTPATIENT
Start: 2020-01-18 | End: 2020-01-20 | Stop reason: HOSPADM

## 2020-01-18 RX ORDER — LORAZEPAM 0.5 MG/1
0.5 TABLET ORAL ONCE AS NEEDED
Status: COMPLETED | OUTPATIENT
Start: 2020-01-18 | End: 2020-01-18

## 2020-01-18 RX ORDER — HYDRALAZINE HYDROCHLORIDE 25 MG/1
25 TABLET, FILM COATED ORAL EVERY 8 HOURS PRN
Status: DISCONTINUED | OUTPATIENT
Start: 2020-01-18 | End: 2020-01-20 | Stop reason: HOSPADM

## 2020-01-18 RX ORDER — IBUPROFEN 200 MG
16 TABLET ORAL
Status: DISCONTINUED | OUTPATIENT
Start: 2020-01-18 | End: 2020-01-20 | Stop reason: HOSPADM

## 2020-01-18 RX ORDER — ACETAMINOPHEN 325 MG/1
650 TABLET ORAL EVERY 4 HOURS PRN
Status: DISCONTINUED | OUTPATIENT
Start: 2020-01-18 | End: 2020-01-20 | Stop reason: HOSPADM

## 2020-01-18 RX ORDER — BISACODYL 10 MG
10 SUPPOSITORY, RECTAL RECTAL DAILY PRN
Status: DISCONTINUED | OUTPATIENT
Start: 2020-01-18 | End: 2020-01-20 | Stop reason: HOSPADM

## 2020-01-18 RX ORDER — INSULIN ASPART 100 [IU]/ML
4 INJECTION, SOLUTION INTRAVENOUS; SUBCUTANEOUS
Status: DISCONTINUED | OUTPATIENT
Start: 2020-01-19 | End: 2020-01-20 | Stop reason: HOSPADM

## 2020-01-18 RX ORDER — HEPARIN SODIUM 5000 [USP'U]/ML
5000 INJECTION, SOLUTION INTRAVENOUS; SUBCUTANEOUS EVERY 8 HOURS
Status: DISCONTINUED | OUTPATIENT
Start: 2020-01-18 | End: 2020-01-20 | Stop reason: HOSPADM

## 2020-01-18 RX ORDER — LEVETIRACETAM 500 MG/1
1000 TABLET ORAL DAILY
Status: DISCONTINUED | OUTPATIENT
Start: 2020-01-19 | End: 2020-01-20 | Stop reason: HOSPADM

## 2020-01-18 RX ORDER — PREGABALIN 25 MG/1
25 CAPSULE ORAL DAILY
Status: DISCONTINUED | OUTPATIENT
Start: 2020-01-19 | End: 2020-01-20 | Stop reason: HOSPADM

## 2020-01-18 RX ORDER — IBUPROFEN 200 MG
24 TABLET ORAL
Status: DISCONTINUED | OUTPATIENT
Start: 2020-01-18 | End: 2020-01-20 | Stop reason: HOSPADM

## 2020-01-18 RX ORDER — SODIUM CHLORIDE 0.9 % (FLUSH) 0.9 %
5 SYRINGE (ML) INJECTION
Status: DISCONTINUED | OUTPATIENT
Start: 2020-01-18 | End: 2020-01-20 | Stop reason: HOSPADM

## 2020-01-18 RX ORDER — FOLIC ACID 1 MG/1
1 TABLET ORAL DAILY
Status: DISCONTINUED | OUTPATIENT
Start: 2020-01-19 | End: 2020-01-20 | Stop reason: HOSPADM

## 2020-01-18 RX ORDER — CLOPIDOGREL BISULFATE 75 MG/1
75 TABLET ORAL DAILY
Status: DISCONTINUED | OUTPATIENT
Start: 2020-01-19 | End: 2020-01-20 | Stop reason: HOSPADM

## 2020-01-18 RX ORDER — ASPIRIN 81 MG/1
81 TABLET ORAL EVERY MORNING
Status: DISCONTINUED | OUTPATIENT
Start: 2020-01-19 | End: 2020-01-20 | Stop reason: HOSPADM

## 2020-01-18 RX ORDER — ONDANSETRON 8 MG/1
8 TABLET, ORALLY DISINTEGRATING ORAL EVERY 8 HOURS PRN
Status: DISCONTINUED | OUTPATIENT
Start: 2020-01-18 | End: 2020-01-20 | Stop reason: HOSPADM

## 2020-01-18 RX ORDER — SEVELAMER CARBONATE 800 MG/1
800 TABLET, FILM COATED ORAL ONCE
Status: COMPLETED | OUTPATIENT
Start: 2020-01-18 | End: 2020-01-18

## 2020-01-18 RX ORDER — SEVELAMER CARBONATE 800 MG/1
800 TABLET, FILM COATED ORAL
Status: DISCONTINUED | OUTPATIENT
Start: 2020-01-19 | End: 2020-01-20 | Stop reason: HOSPADM

## 2020-01-18 RX ORDER — POLYETHYLENE GLYCOL 3350 17 G/17G
17 POWDER, FOR SOLUTION ORAL DAILY
Status: DISCONTINUED | OUTPATIENT
Start: 2020-01-19 | End: 2020-01-20 | Stop reason: HOSPADM

## 2020-01-18 RX ORDER — IPRATROPIUM BROMIDE AND ALBUTEROL SULFATE 2.5; .5 MG/3ML; MG/3ML
3 SOLUTION RESPIRATORY (INHALATION) EVERY 4 HOURS PRN
Status: DISCONTINUED | OUTPATIENT
Start: 2020-01-18 | End: 2020-01-20 | Stop reason: HOSPADM

## 2020-01-18 RX ORDER — FAMOTIDINE 20 MG/1
20 TABLET, FILM COATED ORAL DAILY
Status: DISCONTINUED | OUTPATIENT
Start: 2020-01-19 | End: 2020-01-20

## 2020-01-18 RX ORDER — FLUOXETINE 10 MG/1
10 CAPSULE ORAL DAILY
Status: DISCONTINUED | OUTPATIENT
Start: 2020-01-19 | End: 2020-01-20 | Stop reason: HOSPADM

## 2020-01-18 RX ORDER — ATORVASTATIN CALCIUM 20 MG/1
40 TABLET, FILM COATED ORAL NIGHTLY
Status: DISCONTINUED | OUTPATIENT
Start: 2020-01-18 | End: 2020-01-20 | Stop reason: HOSPADM

## 2020-01-18 RX ORDER — LEVETIRACETAM 500 MG/1
500 TABLET ORAL DAILY PRN
Status: DISCONTINUED | OUTPATIENT
Start: 2020-01-18 | End: 2020-01-20 | Stop reason: HOSPADM

## 2020-01-18 RX ORDER — SODIUM CHLORIDE 0.9 % (FLUSH) 0.9 %
10 SYRINGE (ML) INJECTION
Status: DISCONTINUED | OUTPATIENT
Start: 2020-01-18 | End: 2020-01-20 | Stop reason: HOSPADM

## 2020-01-18 RX ADMIN — SEVELAMER CARBONATE 800 MG: 800 TABLET, FILM COATED ORAL at 11:01

## 2020-01-18 RX ADMIN — LORAZEPAM 0.5 MG: 0.5 TABLET ORAL at 10:01

## 2020-01-18 RX ADMIN — HYDRALAZINE HYDROCHLORIDE 25 MG: 25 TABLET, FILM COATED ORAL at 10:01

## 2020-01-18 RX ADMIN — INSULIN DETEMIR 13 UNITS: 100 INJECTION, SOLUTION SUBCUTANEOUS at 10:01

## 2020-01-18 RX ADMIN — HEPARIN SODIUM 5000 UNITS: 5000 INJECTION, SOLUTION INTRAVENOUS; SUBCUTANEOUS at 10:01

## 2020-01-18 RX ADMIN — Medication 6 MG: at 10:01

## 2020-01-18 RX ADMIN — ATORVASTATIN CALCIUM 40 MG: 20 TABLET, FILM COATED ORAL at 10:01

## 2020-01-18 NOTE — HPI
"Pt is 60 yo F with history of ESRD via RUE AVG who was at dialysis today and her session was interrupted due to "clotting", and she was sent to the ED. Her AVG was placed in 2018 and has recently required a declot in 12/2019 with post op US being satisfactory. Upon arrival she was noted to have a palpable thrill in her AVG. She also complains of pain with her graft. Denies any fevers, chills, or surrounding erythema.  "

## 2020-01-18 NOTE — SUBJECTIVE & OBJECTIVE
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    Cortical cataract of both eyes 7/26/2016    Decubitus ulcer of buttock, stage 2     Degeneration of lumbar or lumbosacral intervertebral disc 3/5/2013    S/p MRI L-spine 5/2009     Depression     Encounter for blood transfusion     ESRD on hemodialysis     Started August 2018    General anesthetics causing adverse effect in therapeutic use     Hemorrhagic cerebrovascular accident (CVA)     [place] [facility]    History of stroke 6/28/2017    s/p R-MCA stroke with R-putaminal hemorrhagic transformation in 8/2016 and 11/2016 ([place] [facility] with residual L hemiparesis, on AED s/p CVA      Hypertensive retinopathy of both eyes 7/26/2016    Impingement syndrome of right shoulder 7/2/2014    Obesity     THERESA (obstructive sleep apnea) 3/5/2013    No Home CPAP 2ndary to cost     Partial symptomatic epilepsy with complex partial seizures, not intractable, without status epilepticus     Rheumatoid arthritis(714.0)     Rotator cuff tear 7/2/2014    Sarcoidosis     Stroke 2016    left sided flaccidity, SAH    Vertebral artery stenosis 3/5/2013    S/p Stenting per Dr Burnett      Past Surgical History:   Procedure Laterality Date    BREAST SURGERY      breast reduction    COLONOSCOPY N/A 8/11/2016    Procedure: COLONOSCOPY;  Surgeon: Jerry Vilchis MD;   Location: [facility] ENDO (4TH FLR);  Service: Endoscopy;  Laterality: N/A;  Patient reports difficulty awaking from anesthesia in the past.    DECLOTTING OF VASCULAR GRAFT Right 6/20/2019    Procedure: DECLOT-GRAFT;  Surgeon: Cabrera Irwin MD;  Location: [facility] CATH LAB;  Service: Cardiology;  Laterality: Right;    DECLOTTING OF VASCULAR GRAFT Right 12/6/2019    Procedure: DECLOT-GRAFT;  Surgeon: Cabrera Irwin MD;  Location: [facility] OR 2ND FLR;  Service: Peripheral Vascular;  Laterality: Right;    FISTULOGRAM Right 2/11/2019    Procedure: Fistulogram;  Surgeon: Meir Valencia MD;  Location: [facility] CATH LAB;  Service: Peripheral Vascular;  Laterality: Right;    FISTULOGRAM Right 7/8/2019    Procedure: Fistulogram;  Surgeon: WELLINGTON Palm III, MD;  Location: [facility] CATH LAB;  Service: Peripheral Vascular;  Laterality: Right;    FISTULOGRAM Right 12/6/2019    Procedure: Fistulogram;  Surgeon: Cabrera Irwin MD;  [facility];  Service: Peripheral Vascular;  Laterality: Right;    HYSTERECTOMY  1999    JOSE LUIS/BSO ([facility])    PLACEMENT OF ARTERIOVENOUS GRAFT Right 10/18/2018    Procedure: AV GRAFT CREATION;  Surgeon: Meir Valencia MD;  Location: [facility] OR 2ND FLR;  Service: Cardiovascular;  Laterality: Right;    ROTATOR CUFF REPAIR Right July 9, 2014    right side    Skull surgery      Aneurysm    stent placed      in vertebral artery    TOTAL REDUCTION MAMMOPLASTY      TUBAL LIGATION       Family History     Problem Relation (Age of Onset)    Bell's palsy Sister    Blindness Maternal Grandmother    Breast cancer Mother    Diabetes Mother, Son,     Heart attack Mother    Heart disease Mother    Hypertension Mother, Sister    Lupus Sister    No Known Problems Daughter    Sleep apnea Sister    Stroke Sister, Maternal Aunt        Tobacco Use    Smoking status: Never Smoker    Smokeless tobacco: Never Used   Substance and Sexual Activity    Alcohol use: Yes     Comment: occasional wine cooler      Drug use: Never    Sexual activity: Yes     Partners: Male     Birth control/protection: Post-menopausal     Review of Systems   All other systems reviewed and are negative.    Objective:     Vital Signs (Most Recent):  Temp: 98.6 °F (37 °C) (01/18/20 1242)  Pulse: 70 (01/18/20 1456)  Resp: (!) 6 (01/18/20 1456)  BP: (!) 197/81 (01/18/20 1456)  SpO2: 96 % (01/18/20 1456) Vital Signs (24h Range):  Temp:  [98.6 °F (37 °C)] 98.6 °F (37 °C)  Pulse:  [70-72] 70  Resp:  [6-18] 6  SpO2:  [96 %-99 %] 96 %  BP: (178-197)/(81) 197/81     Weight: 92 kg (202 lb 13.2 oz)  Body mass index is 35.93 kg/m².    Physical Exam   Constitutional: She is oriented to person, place, and time. She appears well-developed and well-nourished.   Neck: Normal range of motion. Neck supple.   Cardiovascular: Normal rate and regular rhythm.   RUE AVG with palpable thrill, non pulsatile  No swelling or erythema noted  Tender on palpapation  Right radial pulse 1+   Pulmonary/Chest: Effort normal. No respiratory distress.   Abdominal: Soft. She exhibits no distension.   Neurological: She is alert and oriented to person, place, and time.   Skin: Skin is warm and dry.   Psychiatric: She has a normal mood and affect.   Vitals reviewed.      Significant Labs:  CBC:   Recent Labs   Lab 01/18/20  1547   WBC 6.36   RBC 3.18*   HGB 10.2*   HCT 33.6*      *   MCH 32.1*   MCHC 30.4*     CMP: No results for input(s): GLU, CALCIUM, ALBUMIN, PROT, NA, K, CO2, CL, BUN, CREATININE, ALKPHOS, ALT, AST, BILITOT in the last 48 hours.  Coagulation:   Recent Labs   Lab 01/18/20  1547   LABPROT 9.5   INR 0.9   APTT 23.7       Significant Diagnostics:  I have reviewed all pertinent imaging results/findings within the past 24 hours.   No evidence of right upper extremity DVT.    Satisfactory appearance of the right upper extremity vascular graft.    Small fluid collection adjacent to the arterial aspect of the graft demonstrates a nonspecific appearance  possibly representing a hematoma or seroma.  Abscess would be included in the differential diagnosis, however lack of surrounding inflammatory change or hyperemia makes this less likely.  Recommend clinical correlation and further evaluation as necessary.

## 2020-01-18 NOTE — CONSULTS
"[facility]  Vascular Surgery  Consult Note    Inpatient consult to Vascular Surgery  Consult performed by: Yuri Terry MD  Consult ordered by: Xiomy Tanner MD  Reason for consult: AVG "clotting"        Subjective:     Chief Complaint/Reason for Admission: AVG clotting    History of Present Illness: Pt is 60 yo F with history of ESRD via RUE AVG who was at dialysis today and her session was interrupted due to "clotting", and she was sent to the ED. Her AVG was placed in 2018 and has recently required a declot in 12/2019 with post op US being satisfactory. Upon arrival she was noted to have a palpable thrill in her AVG. She also complains of pain with her graft. Denies any fevers, chills, or surrounding erythema.      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    Cortical cataract of both eyes 7/26/2016    Decubitus ulcer of buttock, stage 2     Degeneration of lumbar or lumbosacral intervertebral disc 3/5/2013    S/p MRI L-spine 5/2009     Depression     Encounter for blood transfusion     ESRD on hemodialysis     Started August 2018    General anesthetics causing adverse effect in therapeutic use     Hemorrhagic cerebrovascular accident (CVA)     8/2016 s/p Hemicraniotomy at [facility] with Left hemiparesis    History of stroke 6/28/2017    s/p R-MCA stroke with R-putaminal hemorrhagic transformation in 8/2016 and 11/2016 " (s/p [facility]micraniotomy at [facility]) with residual L hemiparesis, on AED s/p CVA      Hypertensive retinopathy of both eyes 7/26/2016    Impingement syndrome of right shoulder 7/2/2014    Obesity     THERESA (obstructive sleep apnea) 3/5/2013    No Home CPAP 2ndary to cost     Partial symptomatic epilepsy with complex partial seizures, not intractable, without status epilepticus     Rheumatoid arthritis(714.0)     Rotator cuff tear 7/2/2014    Sarcoidosis     Stroke 2016    left sided flaccidity, SAH    Vertebral artery stenosis 3/5/2013    S/p Stenting per Dr Burnett      Past Surgical History:   Procedure Laterality Date    BREAST SURGERY      breast reduction    COLONOSCOPY N/A 8/11/2016    Procedure: COLONOSCOPY;  Surgeon: Jerry Vilchis MD;  Location: [facility] ENDO (4TH FLR);  Service: Endoscopy;  Laterality: N/A;  Patient reports difficulty awaking from anesthesia in the past.    DECLOTTING OF VASCULAR GRAFT Right 6/20/2019    Procedure: DECLOT-GRAFT;  Surgeon: Cabrera Irwin MD;  Location: [facility] CATH LAB;  Service: Cardiology;  Laterality: Right;    DECLOTTING OF VASCULAR GRAFT Right 12/6/2019    Procedure: DECLOT-GRAFT;  Surgeon: Cabrera Irwin MD;  Location: [facility] 2ND FLR;  Service: Peripheral Vascular;  Laterality: Right;    FISTULOGRAM Right 2/11/2019    Procedure: Fistulogram;  Surgeon: Meir Valencia MD;  Location: [facility] CATH LAB;  Service: Peripheral Vascular;  Laterality: Right;    FISTULOGRAM Right 7/8/2019    Procedure: Fistulogram;  Surgeon: WELLINGTON Palm III, MD;  Location: [facility] CATH LAB;  Service: Peripheral Vascular;  Laterality: Right;    FISTULOGRAM Right 12/6/2019    Procedure: Fistulogram;  Surgeon: Cabrera Irwin MD;  Location: [facility] OR 2ND FLR;  Service: Peripheral Vascular;  Laterality: Right;    HYSTERECTOMY  1999    JOSE LUIS/BSO (AUB)    PLACEMENT OF ARTERIOVENOUS GRAFT Right 10/18/2018    Procedure: AV GRAFT CREATION;  Surgeon: Meir Valencia MD;  Location: [facility] OR  2ND FLR;  Service: Cardiovascular;  Laterality: Right;    ROTATOR CUFF REPAIR Right July 9, 2014    right side    Skull surgery      Aneurysm    stent placed      in vertebral artery    TOTAL REDUCTION MAMMOPLASTY      TUBAL LIGATION       Family History     Problem Relation (Age of Onset)    Bell's palsy Sister    Blindness Maternal Grandmother    Breast cancer Mother    Diabetes Mother, Son,     Heart attack Mother    Heart disease Mother    Hypertension Mother, Sister    Lupus Sister    No Known Problems Daughter    Sleep apnea Sister    Stroke Sister, Maternal Aunt        Tobacco Use    Smoking status: Never Smoker    Smokeless tobacco: Never Used   Substance and Sexual Activity    Alcohol use: Yes     Comment: occasional wine cooler     Drug use: Never    Sexual activity: Yes     Partners: Male     Birth control/protection: Post-menopausal     Review of Systems   All other systems reviewed and are negative.    Objective:     Vital Signs (Most Recent):  Temp: 98.6 °F (37 °C) (01/18/20 1242)  Pulse: 70 (01/18/20 1456)  Resp: (!) 6 (01/18/20 1456)  BP: (!) 197/81 (01/18/20 1456)  SpO2: 96 % (01/18/20 1456) Vital Signs (24h Range):  Temp:  [98.6 °F (37 °C)] 98.6 °F (37 °C)  Pulse:  [70-72] 70  Resp:  [6-18] 6  SpO2:  [96 %-99 %] 96 %  BP: (178-197)/(81) 197/81     Weight: 92 kg (202 lb 13.2 oz)  Body mass index is 35.93 kg/m².    Physical Exam   Constitutional: She is oriented to person, place, and time. She appears well-developed and well-nourished.   Neck: Normal range of motion. Neck supple.   Cardiovascular: Normal rate and regular rhythm.   RUE AVG with palpable thrill, non pulsatile  No swelling or erythema noted  Tender on palpapation  Right radial pulse 1+   Pulmonary/Chest: Effort normal. No respiratory distress.   Abdominal: Soft. She exhibits no distension.   Neurological: She is alert and oriented to person, place, and time.   Skin: Skin is warm and dry.   Psychiatric: She has a normal mood  and affect.   Vitals reviewed.      Significant Labs:  CBC:   Recent Labs   Lab 01/18/20  1547   WBC 6.36   RBC 3.18*   HGB 10.2*   HCT 33.6*      *   MCH 32.1*   MCHC 30.4*     CMP: No results for input(s): GLU, CALCIUM, ALBUMIN, PROT, NA, K, CO2, CL, BUN, CREATININE, ALKPHOS, ALT, AST, BILITOT in the last 48 hours.  Coagulation:   Recent Labs   Lab 01/18/20  1547   LABPROT 9.5   INR 0.9   APTT 23.7       Significant Diagnostics:  I have reviewed all pertinent imaging results/findings within the past 24 hours.   No evidence of right upper extremity DVT.    Satisfactory appearance of the right upper extremity vascular graft.    Small fluid collection adjacent to the arterial aspect of the graft demonstrates a nonspecific appearance possibly representing a hematoma or seroma.  Abscess would be included in the differential diagnosis, however lack of surrounding inflammatory change or hyperemia makes this less likely.  Recommend clinical correlation and further evaluation as necessary.    Assessment/Plan:     Problem with vascular access  Pt is 58 yo F who presents with difficulties with HD due to clotting (unspecified)    Since there is a palpable thrill and flow confirmed in US (non-vascular), would recommend attempting HD at [facility]  Pt did report difficulty with obtaining access at her normal dialysis clinic, hematoma likely 2/2 multiple sticks  Low index of suspicion for infection  If any difficulties with HD at [facility], will obtain vascular lab quantitative US to eval for any treatable lesions  If emergent HD needed in the interim and AVG not functional, will need temporary line placed in the meantime    Will discuss with staff        Thank you for your consult. I will follow-up with patient. Please contact us if you have any additional questions.    Yuri Terry MD  Vascular Surgery  [facility]    Vascular Attending  Agree with above  Check U/S  Attempt use for HD    Meir Valencia  MD FACS  Vascular/Endovascular Surgery

## 2020-01-18 NOTE — ED PROVIDER NOTES
"Encounter Date: 1/18/2020       History     Chief Complaint   Patient presents with    Vascular Access Problem     clotted,     Patient is a 59-year-old female with past medical history of ESRD on [facility] hemodialysis via RUE AGF s/p thrombectomy/PTA x2 , HTN, bipolar disorder, asthma, anemia of chronic disease, arthritis, CVA with residual left-sided hemiparesis, wheelchair-bound, sarcoidosis, vertebral artery stenosis, CHF, who presents with acute "clot to my fistula" that occurred just prior to arrival.  Patient states they could palpate a thrill and hear a bruit but were unable to start the dialysis today. Her last dialysis was 2 days ago.  She reports "they tried really hard." She was sent in to be evaluated for a clot.  Pt states she still makes some urine maybe once a day.    The history is provided by the patient.     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    Cortical cataract of both eyes 7/26/2016    Decubitus ulcer of buttock, stage 2     Degeneration of lumbar or lumbosacral intervertebral disc 3/5/2013    S/p MRI L-spine 5/2009     Depression     Encounter for blood transfusion     ESRD on hemodialysis     Started August 2018    General anesthetics causing adverse effect in therapeutic use     Hemorrhagic cerebrovascular accident (CVA)     8[place] [facility] [place]    History of stroke 6/28/2017    s/p R-MCA stroke with R-putaminal " hemorrhagic transformation in 8/2016 and 11/2016 (s/p hemicraniotomy at [facility]) with residual L hemiparesis, on AED s/p CVA      Hypertensive retinopathy of both eyes 7/26/2016    Impingement syndrome of right shoulder 7/2/2014    Obesity     THERESA (obstructive sleep apnea) 3/5/2013    No Home CPAP 2ndary to cost     Partial symptomatic epilepsy with complex partial seizures, not intractable, without status epilepticus     Rheumatoid arthritis(714.0)     Rotator cuff tear 7/2/2014    Sarcoidosis     Stroke 2016    left sided flaccidity, SAH    Vertebral artery stenosis 3/5/2013    S/p Stenting per Dr Burnett      Past Surgical History:   Procedure Laterality Date    BREAST SURGERY      breast reduction    COLONOSCOPY N/A 8/11/2016    Procedure: COLONOSCOPY;  Surgeon: Jerry Vilchis MD;  Location: [facility] ENDO (4TH FLR);  Service: Endoscopy;  Laterality: N/A;  Patient reports difficulty awaking from anesthesia in the past.    DECLOTTING OF VASCULAR GRAFT Right 6/20/2019    Procedure: DECLOT-GRAFT;  Surgeon: Carbera Irwin MD;  Location: [facility] CATH LAB;  Service: Cardiology;  Laterality: Right;    DECLOTTING OF VASCULAR GRAFT Right 12/6/2019    Procedure: DECLOT-GRAFT;  Surgeon: Cabrera Irwin MD;  Location: [facility] OR 2ND FLR;  Service: Peripheral Vascular;  Laterality: Right;    FISTULOGRAM Right 2/11/2019    Procedure: Fistulogram;  Surgeon: Meir Valencia MD;  Location: [facility] CATH LAB;  Service: Peripheral Vascular;  Laterality: Right;    FISTULOGRAM Right 7/8/2019    Procedure: Fistulogram;  Surgeon: WELLINGTON Palm III, MD;  Location: [facility] CATH LAB;  Service: Peripheral Vascular;  Laterality: Right;    FISTULOGRAM Right 12/6/2019    Procedure: Fistulogram;  Surgeon: Cabrera Irwin MD;  Location: [facility] OR 2ND FLR;  Service: Peripheral Vascular;  Laterality: Right;    HYSTERECTOMY  1999    JOSE LUIS/BSO (AUB)    PLACEMENT OF ARTERIOVENOUS GRAFT Right 10/18/2018    Procedure: AV GRAFT CREATION;   "Surgeon: Meir Valencia MD;  Location: [facility] OR [address] [place] [address];  Service: Cardiovascular;  Laterality: Right;    ROTATOR CUFF REPAIR Right July 9, 2014    right side    Skull surgery      Aneurysm    stent placed      in vertebral artery    TOTAL REDUCTION MAMMOPLASTY      TUBAL LIGATION       Family History   Problem Relation Age of Onset    Diabetes Mother     Hypertension Mother     Heart disease Mother     Heart attack Mother     Breast cancer Mother     Stroke Sister     Hypertension Sister     Sleep apnea Sister     No Known Problems Daughter     Diabetes Son     Bell's palsy Sister     Lupus Sister     Blindness Maternal Grandmother     Diabetes Unknown         "My entire family family has diabetes"    Stroke Maternal Aunt     Colon cancer Neg Hx     Ovarian cancer Neg Hx      Social History     Tobacco Use    Smoking status: Never Smoker    Smokeless tobacco: Never Used   Substance Use Topics    Alcohol use: Yes     Comment: occasional wine cooler     Drug use: Never     Review of Systems   Constitutional: Negative for chills, fatigue and fever.   HENT: Negative for congestion.    Eyes: Negative for visual disturbance.   Respiratory: Negative for shortness of breath.    Cardiovascular: Negative for chest pain.   Gastrointestinal: Negative for abdominal pain, nausea and vomiting.   Endocrine: Negative for polydipsia.   Genitourinary: Negative for flank pain.   Musculoskeletal: Negative for back pain.   Skin: Negative for rash.   Neurological: Negative for light-headedness, numbness and headaches.   Hematological: Does not bruise/bleed easily.   Psychiatric/Behavioral: The patient is not nervous/anxious.        Physical Exam     Initial Vitals [01/18/20 1242]   BP Pulse Resp Temp SpO2   (!) 178/81 72 18 98.6 °F (37 °C) 99 %      MAP       --         Physical Exam    Nursing note and vitals reviewed.  Constitutional: She appears well-developed and well-nourished.   HENT:   Head: " Normocephalic and atraumatic.   Eyes: EOM are normal.   Neck: Neck supple.   Cardiovascular: Normal rate, regular rhythm and intact distal pulses.   Palpable thrill to upper and lower AVF of RUE but decreased thrill to superior portion   Pulmonary/Chest: No respiratory distress.   Abdominal: Soft. She exhibits no distension.   Neurological: She is alert and oriented to person, place, and time.   Skin: Skin is warm and dry.   Psychiatric: She has a normal mood and affect. Her behavior is normal. Judgment and thought content normal.         ED Course   Procedures  Labs Reviewed   CBC W/ AUTO DIFFERENTIAL - Abnormal; Notable for the following components:       Result Value    RBC 3.18 (*)     Hemoglobin 10.2 (*)     Hematocrit 33.6 (*)     Mean Corpuscular Volume 106 (*)     Mean Corpuscular Hemoglobin 32.1 (*)     Mean Corpuscular Hemoglobin Conc 30.4 (*)     All other components within normal limits   COMPREHENSIVE METABOLIC PANEL - Abnormal; Notable for the following components:    Glucose 220 (*)     BUN, Bld 51 (*)     Creatinine 7.3 (*)     ALT 6 (*)     eGFR if  6.4 (*)     eGFR if non  5.5 (*)     All other components within normal limits    Narrative:     [id number]   APTT   PROTIME-INR   PHOSPHORUS    Narrative:     [id number]   POCT GLUCOSE MONITORING CONTINUOUS          Imaging Results          US Upper Extremity Veins Right (Final result)  Result time 01/18/20 14:34:45   Procedure changed from US Hemodialysis Access     Final result by Ambrose Bustos MD (01/18/20 14:34:45)                 Impression:      No evidence of right upper extremity DVT.    Satisfactory appearance of the right upper extremity vascular graft.    Small fluid collection adjacent to the arterial aspect of the graft demonstrates a nonspecific appearance possibly representing a hematoma or seroma.  Abscess would be included in the differential diagnosis, however lack of surrounding inflammatory change or  hyperemia makes this less likely.  Recommend clinical correlation and further evaluation as necessary.    Electronically signed by resident: Angelo Kearney  Date:    01/18/2020  Time:    14:18    Electronically signed by: Ambrose Bustos MD  Date:    01/18/2020  Time:    14:34             Narrative:    EXAMINATION:  US UPPER EXTREMITY VEINS RIGHT    CLINICAL HISTORY:  decreased thrill to RUE AVF, could not receive dialysis today;    TECHNIQUE:  Grayscale and color Doppler evaluation of the right/central left central left upper extremity veins, as well as additional imaging of the right extremity vascular graft.    COMPARISON:  Right upper extremity arterial ultrasound 07/06/2019; right upper extremity venous ultrasound 12/18/2018    FINDINGS:  The left jugular vein is patent and compressible with satisfactory flow, and the left subclavian vein appears patent with satisfactory flow.    The right jugular, axillary, basilic, brachial, and cephalic veins are patent and compressible with satisfactory flow, and the right subclavian appears patent with satisfactory flow.    Vascular graft demonstrates satisfactory appearance and flow.    A small fluid collection is present adjacent to the arterial aspect of the graft measuring 1.7 x 1.4 x 0.9 cm.                                 Medical Decision Making:   History:   Old Medical Records: I decided to obtain old medical records.  Initial Assessment:   Palpable thrill to AVF    Differential Diagnosis:   Clot to AVF, human error  Clinical Tests:   Lab Tests: Ordered and Reviewed  Radiological Study: Ordered and Reviewed  ED Management:  Consulted vascular surgery, ntd now  Monitor need for immediate access, otherwise vascular us Monday    Discussed with nephrologist, recommends obs with hospitalist  No emergency dialysis needed now  7:26 PM  Discussed with Dr. Efrem Melendez and plan to bring in for observation night, attempt dialysis in the morning  Given findings on today's us  of likely hematoma, possible dialysis tech just missed the fistula and there maybe success tomorrow  If not then vascular to re-evaluate and vascular US monday                   ED Course as of Jan 19 1133   Sat Jan 18, 2020   1334 Vascular surgery evaluated pt and US pending  Labs not yet sent, hard stick, another nurse will come attempt    [GK]   1521 Paging vascular to give them update on ultrasound, still pending labs    [GK]   1546 Plan to admit to hospital for dialysis and re-evaluation of dialysis AVF    [GK]   1849 Waiting on labs, initial CMP hemolyzed  Patient remains comfortable, no shortness of breath    [GK]   1906 Discussed with nephrologist- pt will need to be admitted to hospitalist to see about dialysis access    [GK]      ED Course User Index  [GK] Xiomy Tanner MD                Clinical Impression:       ICD-10-CM ICD-9-CM   1. Problem with vascular access Z78.9 V49.9   2. ESRD (end stage renal disease) on dialysis N18.6 585.6    Z99.2 V45.11         Disposition:   Disposition: Placed in Observation                     Xiomy Tanner MD  01/19/20 1138

## 2020-01-18 NOTE — ASSESSMENT & PLAN NOTE
Pt is 60 yo F who presents with difficulties with HD due to clotting (unspecified)    Since there is a palpable thrill and flow confirmed in US (non-vascular), would recommend attempting HD at [facility]  Pt did report difficulty with obtaining access at her normal dialysis clinic, hematoma likely 2/2 multiple sticks  Low index of suspicion for infection  If any difficulties with HD at [facility], will obtain vascular lab quantitative US to eval for any treatable lesions  If emergent HD needed in the interim and AVG not functional, will need temporary line placed in the meantime    Will discuss with staff

## 2020-01-18 NOTE — ED NOTES
LOC: The patient is awake and alert; oriented x 3 and speaking appropriately.  APPEARANCE: Patient resting comfortably, patient is clean and well groomed  SKIN: warm and dry, normal skin turgor & moist mucus membranes, skin intact, no breakdown noted.  MUSCULOSKELETAL: Patient moving all extremities well except left  Arm and leg paralysis post CVA , no obvious swelling or deformities noted  RESPIRATORY: Airway is open and patent,; respirations are spontaneous, normal effort and rate  CARDIAC: Patient has a normal rate, no peripheral edema noted, capillary refill < 3 seconds; No complaints of chest pain , dialysis access rt upper arm  ABDOMEN: Soft and non tender to palpation, no distention noted. Bowel sounds present x 4

## 2020-01-18 NOTE — ED NOTES
Labs drawn per us guided IV placement per PA hemolyzed. CMP and phosph to be redrawn - lab venipuncture contacted.

## 2020-01-18 NOTE — ED TRIAGE NOTES
Arrives from dialysis clinic  B/v her access in rt  Upper arm is clotted. Last session was 2 days ago. (Tues/ Thurs/Saturday  )

## 2020-01-19 LAB
ALBUMIN SERPL BCP-MCNC: 3.4 G/DL (ref 3.5–5.2)
ANION GAP SERPL CALC-SCNC: 16 MMOL/L (ref 8–16)
BASOPHILS # BLD AUTO: 0.07 K/UL (ref 0–0.2)
BASOPHILS NFR BLD: 1.2 % (ref 0–1.9)
BUN SERPL-MCNC: 60 MG/DL (ref 8–23)
CALCIUM SERPL-MCNC: 9.3 MG/DL (ref 8.7–10.5)
CHLORIDE SERPL-SCNC: 103 MMOL/L (ref 95–110)
CO2 SERPL-SCNC: 24 MMOL/L (ref 23–29)
CREAT SERPL-MCNC: 7.7 MG/DL (ref 0.5–1.4)
DIFFERENTIAL METHOD: ABNORMAL
EOSINOPHIL # BLD AUTO: 0.2 K/UL (ref 0–0.5)
EOSINOPHIL NFR BLD: 3.8 % (ref 0–8)
ERYTHROCYTE [DISTWIDTH] IN BLOOD BY AUTOMATED COUNT: 14.4 % (ref 11.5–14.5)
EST. GFR  (AFRICAN AMERICAN): 6 ML/MIN/1.73 M^2
EST. GFR  (NON AFRICAN AMERICAN): 5.2 ML/MIN/1.73 M^2
GLUCOSE SERPL-MCNC: 99 MG/DL (ref 70–110)
HCT VFR BLD AUTO: 32.5 % (ref 37–48.5)
HGB BLD-MCNC: 10.3 G/DL (ref 12–16)
IMM GRANULOCYTES # BLD AUTO: 0.05 K/UL (ref 0–0.04)
IMM GRANULOCYTES NFR BLD AUTO: 0.8 % (ref 0–0.5)
LYMPHOCYTES # BLD AUTO: 1.5 K/UL (ref 1–4.8)
LYMPHOCYTES NFR BLD: 24.3 % (ref 18–48)
MCH RBC QN AUTO: 31.5 PG (ref 27–31)
MCHC RBC AUTO-ENTMCNC: 31.7 G/DL (ref 32–36)
MCV RBC AUTO: 99 FL (ref 82–98)
MONOCYTES # BLD AUTO: 0.6 K/UL (ref 0.3–1)
MONOCYTES NFR BLD: 9.3 % (ref 4–15)
NEUTROPHILS # BLD AUTO: 3.6 K/UL (ref 1.8–7.7)
NEUTROPHILS NFR BLD: 60.6 % (ref 38–73)
NRBC BLD-RTO: 0 /100 WBC
PHOSPHATE SERPL-MCNC: 4.4 MG/DL (ref 2.7–4.5)
PLATELET # BLD AUTO: 195 K/UL (ref 150–350)
PMV BLD AUTO: 10.9 FL (ref 9.2–12.9)
POCT GLUCOSE: 165 MG/DL (ref 70–110)
POCT GLUCOSE: 182 MG/DL (ref 70–110)
POCT GLUCOSE: 182 MG/DL (ref 70–110)
POTASSIUM SERPL-SCNC: 4.7 MMOL/L (ref 3.5–5.1)
RBC # BLD AUTO: 3.27 M/UL (ref 4–5.4)
SODIUM SERPL-SCNC: 143 MMOL/L (ref 136–145)
WBC # BLD AUTO: 6.01 K/UL (ref 3.9–12.7)

## 2020-01-19 PROCEDURE — 25000003 PHARM REV CODE 250: Mod: HCNC,NTX | Performed by: PHYSICIAN ASSISTANT

## 2020-01-19 PROCEDURE — 99215 OFFICE O/P EST HI 40 MIN: CPT | Mod: HCNC,NTX,, | Performed by: INTERNAL MEDICINE

## 2020-01-19 PROCEDURE — 85025 COMPLETE CBC W/AUTO DIFF WBC: CPT | Mod: HCNC,NTX

## 2020-01-19 PROCEDURE — 80069 RENAL FUNCTION PANEL: CPT | Mod: HCNC,NTX

## 2020-01-19 PROCEDURE — 94761 N-INVAS EAR/PLS OXIMETRY MLT: CPT | Mod: HCNC,NTX

## 2020-01-19 PROCEDURE — G0378 HOSPITAL OBSERVATION PER HR: HCPCS | Mod: HCNC,NTX

## 2020-01-19 PROCEDURE — 99225 PR SUBSEQUENT OBSERVATION CARE,LEVEL II: CPT | Mod: HCNC,NTX,, | Performed by: PHYSICIAN ASSISTANT

## 2020-01-19 PROCEDURE — 99214 PR OFFICE/OUTPT VISIT, EST, LEVL IV, 30-39 MIN: ICD-10-PCS | Mod: HCNC,NTX,, | Performed by: SURGERY

## 2020-01-19 PROCEDURE — 36415 COLL VENOUS BLD VENIPUNCTURE: CPT | Mod: HCNC,NTX

## 2020-01-19 PROCEDURE — C9399 UNCLASSIFIED DRUGS OR BIOLOG: HCPCS | Mod: HCNC,NTX | Performed by: PHYSICIAN ASSISTANT

## 2020-01-19 PROCEDURE — 99225 PR SUBSEQUENT OBSERVATION CARE,LEVEL II: ICD-10-PCS | Mod: HCNC,NTX,, | Performed by: PHYSICIAN ASSISTANT

## 2020-01-19 PROCEDURE — 99215 PR OFFICE/OUTPT VISIT, EST, LEVL V, 40-54 MIN: ICD-10-PCS | Mod: HCNC,NTX,, | Performed by: INTERNAL MEDICINE

## 2020-01-19 PROCEDURE — 63600175 PHARM REV CODE 636 W HCPCS: Mod: HCNC,NTX | Performed by: PHYSICIAN ASSISTANT

## 2020-01-19 PROCEDURE — 96372 THER/PROPH/DIAG INJ SC/IM: CPT | Performed by: EMERGENCY MEDICINE

## 2020-01-19 PROCEDURE — 99214 OFFICE O/P EST MOD 30 MIN: CPT | Mod: HCNC,NTX,, | Performed by: SURGERY

## 2020-01-19 RX ORDER — SENNOSIDES 8.6 MG/1
8.6 TABLET ORAL NIGHTLY
Status: DISCONTINUED | OUTPATIENT
Start: 2020-01-19 | End: 2020-01-20 | Stop reason: HOSPADM

## 2020-01-19 RX ORDER — LIDOCAINE 50 MG/G
1 PATCH TOPICAL
Status: DISCONTINUED | OUTPATIENT
Start: 2020-01-19 | End: 2020-01-20 | Stop reason: HOSPADM

## 2020-01-19 RX ORDER — SODIUM CHLORIDE 9 MG/ML
INJECTION, SOLUTION INTRAVENOUS ONCE
Status: DISCONTINUED | OUTPATIENT
Start: 2020-01-19 | End: 2020-01-20

## 2020-01-19 RX ORDER — SODIUM CHLORIDE 9 MG/ML
INJECTION, SOLUTION INTRAVENOUS
Status: DISCONTINUED | OUTPATIENT
Start: 2020-01-19 | End: 2020-01-20 | Stop reason: HOSPADM

## 2020-01-19 RX ADMIN — ASPIRIN 81 MG: 81 TABLET, COATED ORAL at 05:01

## 2020-01-19 RX ADMIN — SENNOSIDES 8.6 MG: 8.6 TABLET, FILM COATED ORAL at 09:01

## 2020-01-19 RX ADMIN — Medication 6 MG: at 09:01

## 2020-01-19 RX ADMIN — ACETAMINOPHEN 650 MG: 325 TABLET ORAL at 05:01

## 2020-01-19 RX ADMIN — INSULIN ASPART 4 UNITS: 100 INJECTION, SOLUTION INTRAVENOUS; SUBCUTANEOUS at 11:01

## 2020-01-19 RX ADMIN — SEVELAMER CARBONATE 800 MG: 800 TABLET, FILM COATED ORAL at 05:01

## 2020-01-19 RX ADMIN — FOLIC ACID 1 MG: 1 TABLET ORAL at 09:01

## 2020-01-19 RX ADMIN — PREGABALIN 25 MG: 25 CAPSULE ORAL at 11:01

## 2020-01-19 RX ADMIN — FAMOTIDINE 20 MG: 20 TABLET ORAL at 09:01

## 2020-01-19 RX ADMIN — SEVELAMER CARBONATE 800 MG: 800 TABLET, FILM COATED ORAL at 11:01

## 2020-01-19 RX ADMIN — LEVETIRACETAM 1000 MG: 500 TABLET ORAL at 09:01

## 2020-01-19 RX ADMIN — ATORVASTATIN CALCIUM 40 MG: 20 TABLET, FILM COATED ORAL at 09:01

## 2020-01-19 RX ADMIN — HEPARIN SODIUM 5000 UNITS: 5000 INJECTION, SOLUTION INTRAVENOUS; SUBCUTANEOUS at 03:01

## 2020-01-19 RX ADMIN — POLYETHYLENE GLYCOL 3350 17 G: 17 POWDER, FOR SOLUTION ORAL at 09:01

## 2020-01-19 RX ADMIN — LIDOCAINE 1 PATCH: 50 PATCH TOPICAL at 11:01

## 2020-01-19 RX ADMIN — INSULIN ASPART 4 UNITS: 100 INJECTION, SOLUTION INTRAVENOUS; SUBCUTANEOUS at 05:01

## 2020-01-19 RX ADMIN — INSULIN DETEMIR 13 UNITS: 100 INJECTION, SOLUTION SUBCUTANEOUS at 09:01

## 2020-01-19 RX ADMIN — HEPARIN SODIUM 5000 UNITS: 5000 INJECTION, SOLUTION INTRAVENOUS; SUBCUTANEOUS at 10:01

## 2020-01-19 RX ADMIN — HEPARIN SODIUM 5000 UNITS: 5000 INJECTION, SOLUTION INTRAVENOUS; SUBCUTANEOUS at 05:01

## 2020-01-19 RX ADMIN — FLUOXETINE 10 MG: 10 CAPSULE ORAL at 09:01

## 2020-01-19 RX ADMIN — CLOPIDOGREL BISULFATE 75 MG: 75 TABLET ORAL at 09:01

## 2020-01-19 RX ADMIN — SEVELAMER CARBONATE 800 MG: 800 TABLET, FILM COATED ORAL at 09:01

## 2020-01-19 NOTE — ASSESSMENT & PLAN NOTE
· Hold home glipizide, glargine 14 units nightly  · While in house use weight based insulin dosing  · Addendum are 13 units nightly, Aspira 4 units t.i.d. with meals, low-dose correction scale  Diabetic diet  Hemoglobin A1C   Date Value Ref Range Status   12/04/2019 7.8 (H) 4.0 - 5.6 % Final     Comment:     ADA Screening Guidelines:  5.7-6.4%  Consistent with prediabetes  >or=6.5%  Consistent with diabetes  High levels of fetal hemoglobin interfere with the HbA1C  assay. Heterozygous hemoglobin variants (HbS, HgC, etc)do  not significantly interfere with this assay.   However, presence of multiple variants may affect accuracy.

## 2020-01-19 NOTE — HPI
A 60 y/o AAF with PMhx of ESRD on iHD (T,Th,S), HTN and DM2, RA, Seizure disorder, and old CVA with residual left sided hemiparesis was referred to this instituion from her dialysis center due to clotted AVG. Patient states that outside dialysis center was unable to access AVG. Multiple attempts failed and she was sent to the ED for evaluation. Vascular surgery evaluated patient and since there is a palpable thrill and flow confirmed in US (non-vascular), recommended attempting HD. She denies shortness of breath, chest pain, fever.     Nephrology consulted for management of iHD while inpatient.

## 2020-01-19 NOTE — ASSESSMENT & PLAN NOTE
ESRD on IHD-  TTS   Out [place] Center - [facility]   Duration of outpatient dialysis session - 3 hrs  EDW: ~91kg  Residual Renal Function (Urine Output) - minimal  Access: RUE AVG    End-Stage Renal Disease on HD  - No need for acute RRT today  - As cleared by vascular surgery, will provide dialysis for metabolic clearance and volume management tomorrow.   - Avoid nephrotoxic medications  - Medications to renal parameters  - Strict Input and Output and chart  - Daily standing weights      Anemia of Chronic Kidney Disease   - Hb > 7 gm/dL  - Will resume CHICHI as outpatient   - Will request iron studies to assess further needs of supplementation     Mineral Bone Disease in CKD   - Renal Function Panel Daily for electrolytes monitoring  - Already on binders as outpatient. Please continue Renvela 800mg      HTN   - Will continue to monitor. Goal for BP is <140 mmHg SBP and BDP <90 mmHg, maintain MAP > 65.    Nutrition   - Renal Diet    Case discussed with staff further recs with attestation.

## 2020-01-19 NOTE — PROGRESS NOTES
"[facility] Hospital Medicine  Progress Note    Patient Name: Lucy Perez  MRN: [id number]  Patient Class: OP- Observation   Admission Date: 1/18/2020  Length of Stay: 0 days  Attending Physician: Edna Waller MD  Primary Care Provider: Beverly Muniz MD    [facility] Medicine Team: [facility]OSP MED F Rachel Christensen PA-C    Subjective:     Principal Problem:Problem with vascular access        HPI:  Lucy Perez is a 59F with ESRD on hemodialysis Tuesday, Thursday, Saturday, recent admission December 2019 for AV graft clot, hypertension, diastolic heart failure, insulin-dependent diabetes, central pain syndrome, history of CVA with residual left-sided weakness, wheelchair bound, rheumatoid arthritis, sarcoidosis, seizure, pulmonary hypertension, anemia, anxiety who presents for evaluation of fistula access problems - "clots kept coming out". Patient states that outside dialysis center was unable to access fistula.  Bruit and thrill were present on exam.  Multiple attempts failed and she was sent to the ED for evaluation.  She denies shortness of breath, chest pain, fevers.     ED: AFVSS, no leukocytosis, CXR w/o edema, BP controlled, K 4.9, US with "satisfactory appearance of the right upper extremity vascular graft. Small fluid collection adjacent to the arterial aspect of the graft demonstrates a nonspecific appearance possibly representing a hematoma or seroma.  Abscess would be included in the differential diagnosis, however lack of surrounding inflammatory change or hyperemia makes this less likely."    Overview/Hospital Course:  Mrs. Perez was admitted to observation for clotted dialysis graft. Vascular surgery evaluated, suspect error user in, will attempt HD prior to further interventions.      Interval History: Patient without complaints. NAEO. Unable to get HD until tomorrow. Currently no signs of overload. Patient /72, not currently on blood " pressure medications. Patient with low normal BP readings at last 3 PCP visits. Will monitor overnight, if remains elevated after HD, will add blood pressure agent.     Review of Systems   Constitutional: Negative for fatigue and fever.   HENT: Negative for congestion and rhinorrhea.    Respiratory: Negative for cough, shortness of breath and wheezing.    Cardiovascular: Negative for chest pain and leg swelling.   Gastrointestinal: Negative for abdominal pain, diarrhea and nausea.   Genitourinary: Positive for decreased urine volume (ESRD). Negative for dysuria and hematuria.   Musculoskeletal: Positive for arthralgias, back pain (chronic) and gait problem (wheelchair bound since CVA).   Skin: Negative for rash and wound.   Neurological: Positive for weakness (chronic L sided weakness s/p CVA). Negative for dizziness, syncope and headaches.   Psychiatric/Behavioral: Negative for agitation. The patient is not nervous/anxious.      Objective:     Vital Signs (Most Recent):  Temp: 97.1 °F (36.2 °C) (01/19/20 1234)  Pulse: 69 (01/19/20 1234)  Resp: 16 (01/19/20 1234)  BP: (!) 183/72 (01/19/20 1234)  SpO2: 99 % (01/19/20 1234) Vital Signs (24h Range):  Temp:  [96.6 °F (35.9 °C)-98.6 °F (37 °C)] 97.1 °F (36.2 °C)  Pulse:  [67-78] 69  Resp:  [14-24] 16  SpO2:  [96 %-99 %] 99 %  BP: (120-183)/(61-85) 183/72     Weight: 92 kg (202 lb 13.2 oz)  Body mass index is 35.93 kg/m².    Intake/Output Summary (Last 24 hours) at 1/19/2020 1540  Last data filed at 1/18/2020 2126  Gross per 24 hour   Intake 240 ml   Output --   Net 240 ml      Physical Exam   Constitutional: She is oriented to person, place, and time. She appears well-developed and well-nourished.   HENT:   Head: Normocephalic and atraumatic.   Eyes: Pupils are equal, round, and reactive to light. EOM are normal.   Neck: Normal range of motion. Neck supple.   Cardiovascular: Normal rate, regular rhythm, normal heart sounds and intact distal pulses.   Palpable thrill RUE  "AVG   Pulmonary/Chest: Effort normal and breath sounds normal.   Abdominal: Soft. Bowel sounds are normal.   Musculoskeletal: She exhibits no edema or tenderness.   Neurological: She is alert and oriented to person, place, and time.   Chronic L sided hemiparesis   Skin: Skin is warm and dry.   Psychiatric: She has a normal mood and affect. Her behavior is normal. Judgment and thought content normal.   Nursing note and vitals reviewed.      Significant Labs:   CBC:   Recent Labs   Lab 01/18/20  1547   WBC 6.36   HGB 10.2*   HCT 33.6*        CMP:   Recent Labs   Lab 01/18/20  1811 01/19/20  0529    143   K 4.9 4.7    103   CO2 27 24   * 99   BUN 51* 60*   CREATININE 7.3* 7.7*   CALCIUM 9.4 9.3   PROT 7.2  --    ALBUMIN 3.6 3.4*   BILITOT 0.3  --    ALKPHOS 115  --    AST 11  --    ALT 6*  --    ANIONGAP 14 16   EGFRNONAA 5.5* 5.2*     Magnesium: No results for input(s): MG in the last 48 hours.  POCT Glucose:   Recent Labs   Lab 01/18/20  2142 01/19/20  1132   POCTGLUCOSE 267* 165*       Significant Imaging: I have reviewed all pertinent imaging results/findings within the past 24 hours.      Assessment/Plan:      * Problem with vascular access  · Issues with access at outpatient dialysis center, "clots kept coming out"  · Ultrasound: "Satisfactory appearance of the right upper extremity vascular graft. Small fluid collection adjacent to the arterial aspect of the graft demonstrates a nonspecific appearance possibly representing a hematoma or seroma.  Abscess would be included in the differential diagnosis, however lack of surrounding inflammatory change or hyperemia makes this less likely."  · Vascular surgery consulted, appreciate recs  · May have been user error at dialysis center  · If any difficulties with HD at [facility], will obtain vascular lab quantitative US to eval for any treatable lesions  · Nephrology consulted for HD management  · Unable to get HD today, scheduled for " tomorrow    ESRD (end stage renal disease) on dialysis  · Renal function panel daily  · Renal diet  · Nephrology consult  · HD on Tuesdays Thursdays Saturdays last dialysis on 01/16      Rheumatoid arthritis involving multiple sites  · Stable not on medication    Type 2 diabetes mellitus with chronic kidney disease on chronic dialysis, with long-term current use of insulin  · Hold home glipizide, glargine 14 units nightly  · While in house use weight based insulin dosing  · Addendum are 13 units nightly, Aspira 4 units t.i.d. with meals, low-dose correction scale  Diabetic diet  Hemoglobin A1C   Date Value Ref Range Status   12/04/2019 7.8 (H) 4.0 - 5.6 % Final     Comment:     ADA Screening Guidelines:  5.7-6.4%  Consistent with prediabetes  >or=6.5%  Consistent with diabetes  High levels of fetal hemoglobin interfere with the HbA1C  assay. Heterozygous hemoglobin variants (HbS, HgC, etc)do  not significantly interfere with this assay.   However, presence of multiple variants may affect accuracy.         LEFT Hemiplegia as late effect of stroke  Continue statin and Plavix and aspirin      Essential hypertension  - not on antihypertensives  - last 3 BP readings at PCP low-normal  - elevated during stay, will monitor after HD and add agent if necessary    Anemia in ESRD (end-stage renal disease)  · Stable      Partial symptomatic epilepsy with complex partial seizures, not intractable, without status epilepticus  · Continue Keppra    Central pain syndrome  · Continue home meds      Pulmonary hypertension  · Stable      Sarcoidosis  · Stable        VTE Risk Mitigation (From admission, onward)         Ordered     heparin (porcine) injection 5,000 Units  Every 8 hours      01/18/20 1945     IP VTE HIGH RISK PATIENT  Once      01/18/20 1945     Place LUIS hose  Until discontinued      01/18/20 1945     Place sequential compression device  Until discontinued      01/18/20 1945                      Rachel Christensen,  IBETH  Department of Hospital Medicine   [facility]

## 2020-01-19 NOTE — CONSULTS
[facility]  Nephrology  Consult Note    Patient Name: Lucy Perze  MRN: [id number]  Admission Date: 1/18/2020  Hospital Length of Stay: 0 days  Attending Provider: Edna Waller MD   Primary Care Physician: Beverly Muniz MD  Principal Problem:Problem with vascular access    Inpatient consult to Nephrology  Consult performed by: Den Servin MD  Consult ordered by: Monty Herbert PA-C  Reason for consult: Management of iHD/ Clotted AVG        Subjective:     HPI: A 60 y/o AAF with PMhx of ESRD on iHD (T,Th,S), HTN and DM2, RA, Seizure disorder, and old CVA with residual left sided hemiparesis was referred to this instituion from her dialysis center due to clotted AVG. Patient states that outside dialysis center was unable to access AVG. Multiple attempts failed and she was sent to the ED for evaluation. Vascular surgery evaluated patient and since there is a palpable thrill and flow confirmed in US (non-vascular), recommended attempting HD. She denies shortness of breath, chest pain, fever.     Nephrology consulted for management of iHD while inpatient.     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    Cortical cataract of both eyes 7/26/2016    Decubitus ulcer of buttock, stage 2     Degeneration of lumbar or lumbosacral intervertebral disc 3/5/2013    S/p MRI L-spine 5/2009     Depression     Encounter for blood transfusion     ESRD on hemodialysis     Started August 2018    General anesthetics causing adverse effect in therapeutic use     Hemorrhagic cerebrovascular accident (CVA)     8/2016 s/p Hemicraniotomy at [facility] with Left  hemiparesis    History of stroke 6/28/2017    s/p R-MCA stroke with R-putaminal hemorrhagic transformation in 8/2016 and 11/2016 (s/p [facility]micraniotomy at [facility]) with residual L hemiparesis, on AED s/p CVA      Hypertensive retinopathy of both eyes 7/26/2016    Impingement syndrome of right shoulder 7/2/2014    Obesity     THERESA (obstructive sleep apnea) 3/5/2013    No Home CPAP 2ndary to cost     Partial symptomatic epilepsy with complex partial seizures, not intractable, without status epilepticus     Rheumatoid arthritis(714.0)     Rotator cuff tear 7/2/2014    Sarcoidosis     Stroke 2016    left sided flaccidity, SAH    Vertebral artery stenosis 3/5/2013    S/p Stenting per Dr Burnett        Past Surgical History:   Procedure Laterality Date    BREAST SURGERY      breast reduction    COLONOSCOPY N/A 8/11/2016    Procedure: COLONOSCOPY;  Surgeon: Jerry Vilchis MD;  Location: [facility] ENDO (4TH FLR);  Service: Endoscopy;  Laterality: N/A;  Patient reports difficulty awaking from anesthesia in the past.    DECLOTTING OF VASCULAR GRAFT Right 6/20/2019    Procedure: DECLOT-GRAFT;  Surgeon: Cabrera Irwin MD;  Location: [facility] CATH LAB;  Service: Cardiology;  Laterality: Right;    DECLOTTING OF VASCULAR GRAFT Right 12/6/2019    Procedure: DECLOT-GRAFT;  Surgeon: Cabrera Irwin MD;  [facility];  Service: Peripheral Vascular;  Laterality: Right;    FISTULOGRAM Right 2/11/2019    Procedure: Fistulogram;  Surgeon: Meir Valencia MD;  Location: [facility] CATH LAB;  Service: Peripheral Vascular;  Laterality: Right;    FISTULOGRAM Right 7/8/2019    Procedure: Fistulogram;  Surgeon: WELLINGTON Palm III, MD;  Location: [facility] CATH LAB;  Service: Peripheral Vascular;  Laterality: Right;    FISTULOGRAM Right 12/6/2019    Procedure: Fistulogram;  Surgeon: Cabrera Irwin MD;  [facility];  Service: Peripheral Vascular;  Laterality: Right;    HYSTERECTOMY  1999    JOSE LUIS/BSO (AU)     PLACEMENT OF ARTERIOVENOUS GRAFT Right 10/18/2018    Procedure: AV GRAFT CREATION;  Surgeon: Meir Valencia MD;  Location: [facility] OR [address] [place] [address];  Service: Cardiovascular;  Laterality: Right;    ROTATOR CUFF REPAIR Right July 9, 2014    right side    Skull surgery      Aneurysm    stent placed      in vertebral artery    TOTAL REDUCTION MAMMOPLASTY      TUBAL LIGATION         Review of patient's allergies indicates:   Allergen Reactions    Lisinopril Other (See Comments)     Angioedema      Vicodin [hydrocodone-acetaminophen] Rash     No problem with acetaminophen      Current Facility-Administered Medications   Medication Frequency    acetaminophen tablet 650 mg Q4H PRN    albuterol-ipratropium 2.5 mg-0.5 mg/3 mL nebulizer solution 3 mL Q4H PRN    aspirin EC tablet 81 mg QAM    atorvastatin tablet 40 mg QHS    bisacodyl suppository 10 mg Daily PRN    clopidogrel tablet 75 mg Daily    dextrose 10% (D10W) Bolus PRN    dextrose 10% (D10W) Bolus PRN    famotidine tablet 20 mg Daily    FLUoxetine capsule 10 mg Daily    folic acid tablet 1 mg Daily    glucagon (human recombinant) injection 1 mg PRN    glucose chewable tablet 16 g PRN    glucose chewable tablet 24 g PRN    heparin (porcine) injection 5,000 Units Q8H    hydrALAZINE tablet 25 mg Q8H PRN    insulin aspart U-100 pen 0-5 Units QID (AC + HS) PRN    insulin aspart U-100 pen 4 Units TIDWM    insulin detemir U-100 pen 13 Units QHS    levETIRAcetam tablet 1,000 mg Daily    levETIRAcetam tablet 500 mg Daily PRN    lidocaine 5 % patch 1 patch Q24H    melatonin tablet 6 mg Nightly PRN    ondansetron disintegrating tablet 8 mg Q8H PRN    polyethylene glycol packet 17 g Daily    pregabalin capsule 25 mg Daily    promethazine (PHENERGAN) 6.25 mg in dextrose 5 % 50 mL IVPB Q6H PRN    senna tablet 8.6 mg QHS    sevelamer carbonate tablet 800 mg TID WM    sodium chloride 0.9% flush 10 mL PRN    sodium chloride 0.9% flush 5 mL PRN     Family  History     Problem Relation (Age of Onset)    Bell's palsy Sister    Blindness Maternal Grandmother    Breast cancer Mother    Diabetes Mother, Son,     Heart attack Mother    Heart disease Mother    Hypertension Mother, Sister    Lupus Sister    No Known Problems Daughter    Sleep apnea Sister    Stroke Sister, Maternal Aunt        Tobacco Use    Smoking status: Never Smoker    Smokeless tobacco: Never Used   Substance and Sexual Activity    Alcohol use: Yes     Comment: occasional wine cooler     Drug use: Never    Sexual activity: Yes     Partners: Male     Birth control/protection: Post-menopausal     Review of Systems   Cardiovascular:        RUE AVF edema/tenderness     Objective:     Vital Signs (Most Recent):  Temp: 97.1 °F (36.2 °C) (01/19/20 1234)  Pulse: 69 (01/19/20 1234)  Resp: 16 (01/19/20 1234)  BP: (!) 183/72 (01/19/20 1234)  SpO2: 99 % (01/19/20 1234)  O2 Device (Oxygen Therapy): room air (01/18/20 1817) Vital Signs (24h Range):  Temp:  [96.6 °F (35.9 °C)-98.6 °F (37 °C)] 97.1 °F (36.2 °C)  Pulse:  [67-78] 69  Resp:  [14-24] 16  SpO2:  [96 %-99 %] 99 %  BP: (120-183)/(61-85) 183/72     Weight: 92 kg (202 lb 13.2 oz) (01/18/20 1242)  Body mass index is 35.93 kg/m².  Body surface area is 2.02 meters squared.    I/O last 3 completed shifts:  In: 240 [P.O.:240]  Out: -     Physical Exam   Constitutional: She is oriented to person, place, and time. She appears well-developed and well-nourished. No distress.   HENT:   Head: Normocephalic and atraumatic.   Eyes: EOM are normal. Right eye exhibits no discharge. Left eye exhibits no discharge.   Neck: Normal range of motion. Neck supple. No JVD present.   Cardiovascular: Normal rate, regular rhythm and normal heart sounds.   No murmur heard.  Edematous AVG on RUE    Pulmonary/Chest: Effort normal. No respiratory distress. She has no wheezes. She has no rales.   Abdominal: Soft. Bowel sounds are normal. She exhibits no distension. There is no  tenderness.   Musculoskeletal: She exhibits no edema or tenderness.   Neurological: She is alert and oriented to person, place, and time.   Chronic L sided hemiparesis   Skin: Skin is warm and dry. She is not diaphoretic.   Psychiatric: She has a normal mood and affect. Her behavior is normal. Judgment and thought content normal.   Nursing note and vitals reviewed.      Significant Labs:  CBC:   Recent Labs   Lab 01/19/20  1530   WBC 6.01   RBC 3.27*   HGB 10.3*   HCT 32.5*      MCV 99*   MCH 31.5*   MCHC 31.7*     CMP:   Recent Labs   Lab 01/18/20  1811 01/19/20  0529   * 99   CALCIUM 9.4 9.3   ALBUMIN 3.6 3.4*   PROT 7.2  --     143   K 4.9 4.7   CO2 27 24    103   BUN 51* 60*   CREATININE 7.3* 7.7*   ALKPHOS 115  --    ALT 6*  --    AST 11  --    BILITOT 0.3  --      All labs within the past 24 hours have been reviewed.        Assessment/Plan:     * Problem with vascular access  - Followed by vascular surgery. Recommendations to follow.   - Attempt HD trough AVG.     ESRD (end stage renal disease) on dialysis  ESRD on IHD-  [facility]   [facility] - [facility]   Duration of outpatient dialysis session - 3 hrs  EDW: ~91kg  Residual Renal Function (Urine Output) - minimal  Access: RUE AVG    End-Stage Renal Disease on HD  - No need for acute RRT today  - As cleared by vascular surgery, will provide dialysis for metabolic clearance and volume management tomorrow.   - Avoid nephrotoxic medications  - Medications to renal parameters  - Strict Input and Output and chart  - Daily standing weights      Anemia of Chronic Kidney Disease   - Hb > 7 gm/dL  - Will resume CHICHI as outpatient   - Will request iron studies to assess further needs of supplementation     Mineral Bone Disease in CKD   - Renal Function Panel Daily for electrolytes monitoring  - Already on binders as outpatient. Please continue Renvela 800mg      HTN   - Will continue to monitor. Goal for BP is <140 mmHg SBP  and BDP <90 mmHg, maintain MAP > 65.    Nutrition   - Renal Diet    Case discussed with staff further recs with attestation.        Thank you for your consult. I will follow-up with patient. Please contact us if you have any additional questions.    Den Servin MD  Nephrology  [facility]

## 2020-01-19 NOTE — SUBJECTIVE & OBJECTIVE
Interval History: Patient without complaints. NAEO. Unable to get HD until tomorrow. Currently no signs of overload. Patient /72, not currently on blood pressure medications. Patient with low normal BP readings at last 3 PCP visits. Will monitor overnight, if remains elevated after HD, will add blood pressure agent.     Review of Systems   Constitutional: Negative for fatigue and fever.   HENT: Negative for congestion and rhinorrhea.    Respiratory: Negative for cough, shortness of breath and wheezing.    Cardiovascular: Negative for chest pain and leg swelling.   Gastrointestinal: Negative for abdominal pain, diarrhea and nausea.   Genitourinary: Positive for decreased urine volume (ESRD). Negative for dysuria and hematuria.   Musculoskeletal: Positive for arthralgias, back pain (chronic) and gait problem (wheelchair bound since CVA).   Skin: Negative for rash and wound.   Neurological: Positive for weakness (chronic L sided weakness s/p CVA). Negative for dizziness, syncope and headaches.   Psychiatric/Behavioral: Negative for agitation. The patient is not nervous/anxious.      Objective:     Vital Signs (Most Recent):  Temp: 97.1 °F (36.2 °C) (01/19/20 1234)  Pulse: 69 (01/19/20 1234)  Resp: 16 (01/19/20 1234)  BP: (!) 183/72 (01/19/20 1234)  SpO2: 99 % (01/19/20 1234) Vital Signs (24h Range):  Temp:  [96.6 °F (35.9 °C)-98.6 °F (37 °C)] 97.1 °F (36.2 °C)  Pulse:  [67-78] 69  Resp:  [14-24] 16  SpO2:  [96 %-99 %] 99 %  BP: (120-183)/(61-85) 183/72     Weight: 92 kg (202 lb 13.2 oz)  Body mass index is 35.93 kg/m².    Intake/Output Summary (Last 24 hours) at 1/19/2020 1540  Last data filed at 1/18/2020 2126  Gross per 24 hour   Intake 240 ml   Output --   Net 240 ml      Physical Exam   Constitutional: She is oriented to person, place, and time. She appears well-developed and well-nourished.   HENT:   Head: Normocephalic and atraumatic.   Eyes: Pupils are equal, round, and reactive to light. EOM are normal.    Neck: Normal range of motion. Neck supple.   Cardiovascular: Normal rate, regular rhythm, normal heart sounds and intact distal pulses.   Palpable thrill RUE AVG   Pulmonary/Chest: Effort normal and breath sounds normal.   Abdominal: Soft. Bowel sounds are normal.   Musculoskeletal: She exhibits no edema or tenderness.   Neurological: She is alert and oriented to person, place, and time.   Chronic L sided hemiparesis   Skin: Skin is warm and dry.   Psychiatric: She has a normal mood and affect. Her behavior is normal. Judgment and thought content normal.   Nursing note and vitals reviewed.      Significant Labs:   CBC:   Recent Labs   Lab 01/18/20  1547   WBC 6.36   HGB 10.2*   HCT 33.6*        CMP:   Recent Labs   Lab 01/18/20  1811 01/19/20  0529    143   K 4.9 4.7    103   CO2 27 24   * 99   BUN 51* 60*   CREATININE 7.3* 7.7*   CALCIUM 9.4 9.3   PROT 7.2  --    ALBUMIN 3.6 3.4*   BILITOT 0.3  --    ALKPHOS 115  --    AST 11  --    ALT 6*  --    ANIONGAP 14 16   EGFRNONAA 5.5* 5.2*     Magnesium: No results for input(s): MG in the last 48 hours.  POCT Glucose:   Recent Labs   Lab 01/18/20  2142 01/19/20  1132   POCTGLUCOSE 267* 165*       Significant Imaging: I have reviewed all pertinent imaging results/findings within the past 24 hours.

## 2020-01-19 NOTE — HOSPITAL COURSE
Mrs. Perez was admitted to observation for clotted dialysis graft. Vascular surgery evaluated, suspect error user in, will attempt HD prior to further interventions.  Patient underwent HD successfully, no difficulty with access. Patient discharged, PCP follow up in 1 week. Patient verbalized understanding. All questions answered.

## 2020-01-19 NOTE — H&P
"[facility] Medicine  History & Physical    Patient Name: Lucy Perez  MRN: [id number]  Admission Date: 1/18/2020  Attending Physician: Edna Waller MD   Primary Care Provider: Beverly Muniz MD    [facility] Medicine Team: [facility] MED  Monty Herbert PA-C     Patient information was obtained from patient, past medical records and ER records.     Subjective:     Principal Problem:Problem with vascular access    Chief Complaint:   Chief Complaint   Patient presents with    Vascular Access Problem     clotted,        HPI: Lucy Perez is a 59F with ESRD on hemodialysis Tuesday, Thursday, Saturday, recent admission December 2019 for AV graft clot, hypertension, diastolic heart failure, insulin-dependent diabetes, central pain syndrome, history of CVA with residual left-sided weakness, wheelchair bound, rheumatoid arthritis, sarcoidosis, seizure, pulmonary hypertension, anemia, anxiety who presents for evaluation of fistula access problems - "clots kept coming out". Patient states that outside dialysis center was unable to access fistula.  Bruit and thrill were present on exam.  Multiple attempts failed and she was sent to the ED for evaluation.  She denies shortness of breath, chest pain, fevers.     ED: AFVSS, no leukocytosis, CXR w/o edema, BP controlled, K 4.9, US with "satisfactory appearance of the right upper extremity vascular graft. Small fluid collection adjacent to the arterial aspect of the graft demonstrates a nonspecific appearance possibly representing a hematoma or seroma.  Abscess would be included in the differential diagnosis, however lack of surrounding inflammatory change or hyperemia makes this less likely."    Past Medical History:   Diagnosis Date    Anemia of chronic disease 6/10/2017    Anxiety     Arthritis of right acromioclavicular joint 7/2/2014    Asthma     Bipolar disorder     Bronchitis, acute     Cataract     " Cholelithiasis     Chronic diastolic CHF (congestive heart failure)     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    Cortical cataract of both eyes 7/26/2016    Decubitus ulcer of buttock, stage 2     Degeneration of lumbar or lumbosacral intervertebral disc 3/5/2013    S/p MRI L-spine 5/2009     Depression     Encounter for blood transfusion     ESRD on hemodialysis     Started August 2018    General anesthetics causing adverse effect in therapeutic use     Hemorrhagic cerebrovascular accident (CVA)     8/2016 s/p Hemicraniotomy at [facility] with Left hemiparesis    History of stroke 6/28/2017    s/p R-MCA stroke with R-putaminal hemorrhagic transformation in 8/2016 and 11/2016 (s/p hemicraniotomy at [facility]) with residual L hemiparesis, on AED s/p CVA      Hypertensive retinopathy of both eyes 7/26/2016    Impingement syndrome of right shoulder 7/2/2014    Obesity     THERESA (obstructive sleep apnea) 3/5/2013    No Home CPAP 2ndary to cost     Partial symptomatic epilepsy with complex partial seizures, not intractable, without status epilepticus     Rheumatoid arthritis(714.0)     Rotator cuff tear 7/2/2014    Sarcoidosis     Stroke 2016    left sided flaccidity, SAH    Vertebral artery stenosis 3/5/2013    S/p Stenting per Dr Burnett        Past Surgical History:   Procedure Laterality Date    BREAST SURGERY      breast reduction    COLONOSCOPY N/A 8/11/2016    Procedure: COLONOSCOPY;  Surgeon: Jerry Vilchis MD;  Location: [facility] (4TH FLR);  Service: Endoscopy;  Laterality: N/A;  Patient reports difficulty awaking from anesthesia in the past.    DECLOTTING OF VASCULAR GRAFT Right 6/20/2019    Procedure: DECLOT-GRAFT;  Surgeon: Cabrera Irwin MD;  Location: [facility] CATH LAB;  Service: Cardiology;  Laterality: Right;    DECLOTTING OF VASCULAR GRAFT Right 12/6/2019    Procedure: DECLOT-GRAFT;  Surgeon: Cabrera Irwin MD;  [facility];   Service: Peripheral Vascular;  Laterality: Right;    FISTULOGRAM Right 2/11/2019    Procedure: Fistulogram;  Surgeon: Meir Valencia MD;  Location: [facility] CATH LAB;  Service: Peripheral Vascular;  Laterality: Right;    FISTULOGRAM Right 7/8/2019    Procedure: Fistulogram;  Surgeon: WELLINGTON Palm III, MD;  Location: [facility] CATH LAB;  Service: Peripheral Vascular;  Laterality: Right;    FISTULOGRAM Right 12/6/2019    Procedure: Fistulogram;  Surgeon: Cabrera Irwin MD;  Location: [facility] OR [address] [place] [address];  Service: Peripheral Vascular;  Laterality: Right;    HYSTERECTOMY  1999    JOSE LUIS/BSO (AUB)    PLACEMENT OF ARTERIOVENOUS GRAFT Right 10/18/2018    Procedure: AV GRAFT CREATION;  Surgeon: Meir Valencia MD;  Location: [facility] OR [address] [place] [address];  Service: Cardiovascular;  Laterality: Right;    ROTATOR CUFF REPAIR Right July 9, 2014    right side    Skull surgery      Aneurysm    stent placed      in vertebral artery    TOTAL REDUCTION MAMMOPLASTY      TUBAL LIGATION         Review of patient's allergies indicates:   Allergen Reactions    Lisinopril Other (See Comments)     Angioedema      Vicodin [hydrocodone-acetaminophen] Rash     No problem with acetaminophen        No current facility-administered medications on file prior to encounter.      Current Outpatient Medications on File Prior to Encounter   Medication Sig    atorvastatin (LIPITOR) 40 MG tablet TAKE 1 TABLET ONE TIME DAILY FOR CHOLESTEROL    bisacodyl (DULCOLAX) 10 mg Supp Place 1 suppository (10 mg total) rectally daily as needed.    clopidogrel (PLAVIX) 75 mg tablet Take 1 tablet (75 mg total) by mouth once daily.    ergocalciferol (ERGOCALCIFEROL) 50,000 unit Cap Take 1 capsule (50,000 Units total) by mouth every 7 days.    famotidine (PEPCID) 20 MG tablet Take 1 tablet (20 mg total) by mouth once daily.    FLUoxetine 10 MG capsule Take 1 capsule (10 mg total) by mouth once daily.    insulin (LANTUS SOLOSTAR U-100 INSULIN) glargine 100 units/mL  "(3mL) SubQ pen Inject 14 units at night, cut back to 12 units if sugar is < 120.    insulin aspart U-100 (NOVOLOG U-100 INSULIN ASPART) 100 unit/mL injection Use correction scale 180-230+1, 231-280+2, 281-330+3, 331-380+4, >380+5, max 15 units.    insulin glargine (LANTUS U-100 INSULIN) 100 unit/mL injection Inject 14 units at night. Cut back to 12 units if sugar is < 120.    levETIRAcetam (KEPPRA) 1000 MG tablet Take 1 tablet (1,000 mg total) by mouth once daily.    levETIRAcetam (KEPPRA) 500 MG Tab Take 1 tablet (500 mg total) by mouth every Mon, Wed, Fri. Monday Wednesday and Friday after DIALYSIS take additional 500mg    LORazepam (ATIVAN) 0.5 MG tablet 1 tab 1 hour prior to Dialysis    ondansetron (ZOFRAN-ODT) 4 MG TbDL Take 1 tablet (4 mg total) by mouth every 8 (eight) hours as needed.    polyethylene glycol (MIRALAX) 17 gram/dose powder Take 17 g by mouth 2 (two) times daily.    pregabalin (LYRICA) 25 MG capsule Take 1 capsule (25 mg total) by mouth once daily. May take additional dose after HD.    acetaminophen (TYLENOL) 325 MG tablet Take 2 tablets (650 mg total) by mouth every 6 (six) hours as needed for Pain.    albuterol (VENTOLIN HFA) 90 mcg/actuation inhaler Inhale 2 puffs into the lungs every 6 (six) hours as needed for Wheezing. Rescue    aspirin (ECOTRIN) 81 MG EC tablet Take 81 mg by mouth every morning.     BD INSULIN PEN NEEDLE UF SHORT 31 gauge x 5/16" Ndle USE TO INJECT NOVOLOG FLEXPEN BEFORE MEALS    blood sugar diagnostic Strp To check BG 4  times daily, to use with insurance preferred meter-true metrix    blood-glucose meter kit To check BG 4 times daily, to use with insurance preferred meter-true metrix    folic acid (FOLVITE) 1 MG tablet Take 1 tablet (1 mg total) by mouth once daily.    glipiZIDE (GLUCOTROL) 2.5 MG TR24 Take 1 tablet (hold if less than 150) at lunch (non dialysis days)    lidocaine (LIDODERM) 5 % Place 1 patch onto the skin once daily. Remove & Discard " "patch within 12 hours or as directed by MD    pen needle, diabetic (BD EMI 2ND GEN PEN NEEDLE) 32 gauge x 5/32" Ndle Uses once a day, 90 day    sevelamer carbonate (RENVELA) 800 mg Tab Take 800 mg by mouth 3 (three) times daily with meals.    TRUEPLUS LANCETS 33 gauge Misc      Family History     Problem Relation (Age of Onset)    Bell's palsy Sister    Blindness Maternal Grandmother    Breast cancer Mother    Diabetes Mother, Son,     Heart attack Mother    Heart disease Mother    Hypertension Mother, Sister    Lupus Sister    No Known Problems Daughter    Sleep apnea Sister    Stroke Sister, Maternal Aunt        Tobacco Use    Smoking status: Never Smoker    Smokeless tobacco: Never Used   Substance and Sexual Activity    Alcohol use: Yes     Comment: occasional wine cooler     Drug use: Never    Sexual activity: Yes     Partners: Male     Birth control/protection: Post-menopausal     Review of Systems   Constitutional: Negative for fatigue and fever.   HENT: Negative for congestion, postnasal drip and rhinorrhea.    Respiratory: Negative for cough, chest tightness, shortness of breath and wheezing.    Cardiovascular: Negative for chest pain, palpitations and leg swelling.   Gastrointestinal: Negative for abdominal pain, diarrhea, nausea and vomiting.   Genitourinary: Positive for decreased urine volume (ESRD). Negative for dysuria and hematuria.   Musculoskeletal: Positive for arthralgias, back pain (chronic) and gait problem (wheelchair bound since CVA).   Skin: Negative for rash and wound.   Neurological: Positive for weakness (chronic L sided weakness s/p CVA). Negative for dizziness, syncope and headaches.   Psychiatric/Behavioral: Negative for agitation. The patient is not nervous/anxious.      Objective:     Vital Signs (Most Recent):  Temp: 98.6 °F (37 °C) (01/18/20 1817)  Pulse: 78 (01/18/20 1817)  Resp: 18 (01/18/20 1817)  BP: (!) 157/73 (01/18/20 1817)  SpO2: 96 % (01/18/20 1817) Vital Signs " "(24h Range):  Temp:  [98.6 °F (37 °C)] 98.6 °F (37 °C)  Pulse:  [70-78] 78  Resp:  [16-18] 18  SpO2:  [96 %-99 %] 96 %  BP: (157-197)/(73-81) 157/73     Weight: 92 kg (202 lb 13.2 oz)  Body mass index is 35.93 kg/m².    Physical Exam   Constitutional: She is oriented to person, place, and time. She appears well-developed and well-nourished. No distress.   HENT:   Head: Normocephalic and atraumatic.   Eyes: EOM are normal. Right eye exhibits no discharge. Left eye exhibits no discharge.   Neck: Normal range of motion. Neck supple. No JVD present.   Cardiovascular: Normal rate, regular rhythm and normal heart sounds.   No murmur heard.  Palpable thrill RUE AVG   Pulmonary/Chest: Effort normal. No respiratory distress. She has no wheezes. She has no rales.   Abdominal: Soft. Bowel sounds are normal. She exhibits no distension. There is no tenderness.   Musculoskeletal: She exhibits no edema or tenderness.   Neurological: She is alert and oriented to person, place, and time.   Chronic L sided hemiparesis   Skin: Skin is warm and dry. She is not diaphoretic.   Psychiatric: She has a normal mood and affect. Her behavior is normal. Judgment and thought content normal.   Nursing note and vitals reviewed.        CRANIAL NERVES     CN III, IV, VI   Extraocular motions are normal.        Significant Labs:   CBC:   Recent Labs   Lab 01/18/20  1547   WBC 6.36   HGB 10.2*   HCT 33.6*        CMP:   Recent Labs   Lab 01/18/20  1811      K 4.9      CO2 27   *   BUN 51*   CREATININE 7.3*   CALCIUM 9.4   PROT 7.2   ALBUMIN 3.6   BILITOT 0.3   ALKPHOS 115   AST 11   ALT 6*   ANIONGAP 14   EGFRNONAA 5.5*       Significant Imaging: I have reviewed all pertinent imaging results/findings within the past 24 hours.    Assessment/Plan:     * Problem with vascular access  · Issues with access at outpatient dialysis center, "clots kept coming out"  · Ultrasound: "Satisfactory appearance of the right upper extremity " "vascular graft. Small fluid collection adjacent to the arterial aspect of the graft demonstrates a nonspecific appearance possibly representing a hematoma or seroma.  Abscess would be included in the differential diagnosis, however lack of surrounding inflammatory change or hyperemia makes this less likely."  · May have been user error at dialysis center  · Trial dialysis Jose morning, if trouble continues, then further investigation    Essential hypertension  - not on antihypertensives    ESRD (end stage renal disease) on dialysis  · Renal function panel daily  · Renal diet  · Nephrology consult  · HD on Tuesdays Thursdays Saturdays last dialysis on 01/16      Rheumatoid arthritis involving multiple sites  · Stable not on medication    Type 2 diabetes mellitus with chronic kidney disease on chronic dialysis, with long-term current use of insulin  · Hold home glipizide, glargine 14 units nightly  · While in house use weight based insulin dosing  · Addendum are 13 units nightly, Aspira 4 units t.i.d. with meals, low-dose correction scale  Diabetic diet  Hemoglobin A1C   Date Value Ref Range Status   12/04/2019 7.8 (H) 4.0 - 5.6 % Final     Comment:     ADA Screening Guidelines:  5.7-6.4%  Consistent with prediabetes  >or=6.5%  Consistent with diabetes  High levels of fetal hemoglobin interfere with the HbA1C  assay. Heterozygous hemoglobin variants (HbS, HgC, etc)do  not significantly interfere with this assay.   However, presence of multiple variants may affect accuracy.         LEFT Hemiplegia as late effect of stroke  Continue statin and Plavix and aspirin      Anemia in ESRD (end-stage renal disease)  · Stable      Partial symptomatic epilepsy with complex partial seizures, not intractable, without status epilepticus  · Continue Keppra    Central pain syndrome  · Continue home meds      Pulmonary hypertension  · Stable      Sarcoidosis  · Stable      VTE Risk Mitigation (From admission, onward)         Ordered "     heparin (porcine) injection 5,000 Units  Every 8 hours      01/18/20 1945     IP VTE HIGH RISK PATIENT  Once      01/18/20 1945     Place LUIS hose  Until discontinued      01/18/20 1945     Place sequential compression device  Until discontinued      01/18/20 1945                   Monty Herbert PA-C  Department of Hospital Medicine   [facility]

## 2020-01-19 NOTE — ASSESSMENT & PLAN NOTE
· Renal function panel daily  · Renal diet  · Nephrology consult  · HD on Tuesdays Thursdays Saturdays last dialysis on 01/16

## 2020-01-19 NOTE — SUBJECTIVE & OBJECTIVE
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    Cortical cataract of both eyes 7/26/2016    Decubitus ulcer of buttock, stage 2     Degeneration of lumbar or lumbosacral intervertebral disc 3/5/2013    S/p MRI L-spine 5/2009     Depression     Encounter for blood transfusion     ESRD on hemodialysis     Started August 2018    General anesthetics causing adverse effect in therapeutic use     Hemorrhagic cerebrovascular accident (CVA)     8/2016 s/p Hemicraniotomy at [facility] [place] with Left hemiparesis    History of stroke 6/28/2017    s/p R-MCA stroke with R-putaminal hemorrhagic transformation in 8/2016 and 11/2016 (s/p hemicraniotomy at [facility] [place]) with residual L hemiparesis, on AED s/p CVA      Hypertensive retinopathy of both eyes 7/26/2016    Impingement syndrome of right shoulder 7/2/2014    Obesity     THERESA (obstructive sleep apnea) 3/5/2013    No Home CPAP 2ndary to cost     Partial symptomatic epilepsy with complex partial seizures, not intractable, without status epilepticus     Rheumatoid arthritis(714.0)     Rotator cuff tear 7/2/2014    Sarcoidosis     Stroke 2016    left sided flaccidity, SAH    Vertebral artery stenosis 3/5/2013    S/p Stenting per Dr Burnett        Past Surgical History:   Procedure Laterality Date    BREAST SURGERY      breast reduction    COLONOSCOPY N/A 8/11/2016    Procedure: COLONOSCOPY;  Surgeon: Jerry Vilchis MD;  Location: [address]);  Service: Endoscopy;  Laterality: N/A;  Patient reports difficulty awaking from anesthesia in the past.    DECLOTTING OF VASCULAR GRAFT Right 6/20/2019    Procedure: DECLOT-GRAFT;  Surgeon: Cabrera IBARRA  MD Alida;  Location: [facility] CATH LAB;  Service: Cardiology;  Laterality: Right;    DECLOTTING OF VASCULAR GRAFT Right 12/6/2019    Procedure: DECLOT-GRAFT;  Surgeon: Cabrera Irwin MD;  Location: [address];  Service: Peripheral Vascular;  Laterality: Right;    FISTULOGRAM Right 2/11/2019    Procedure: Fistulogram;  Surgeon: Meir Valencia MD;  Location: [facility] CATH LAB;  Service: Peripheral Vascular;  Laterality: Right;    FISTULOGRAM Right 7/8/2019    Procedure: Fistulogram;  Surgeon: WELLINGTON Palm III, MD;  Location: [facility] CATH LAB;  Service: Peripheral Vascular;  Laterality: Right;    FISTULOGRAM Right 12/6/2019    Procedure: Fistulogram;  Surgeon: Cabrera Irwin MD;  Location: [address];  Service: Peripheral Vascular;  Laterality: Right;    HYSTERECTOMY  1999    JOSE LUIS/BSO (AUB)    PLACEMENT OF ARTERIOVENOUS GRAFT Right 10/18/2018    Procedure: AV GRAFT CREATION;  Surgeon: Meir Valencia MD;  Location: [address];  Service: Cardiovascular;  Laterality: Right;    ROTATOR CUFF REPAIR Right July 9, 2014    right side    Skull surgery      Aneurysm    stent placed      in vertebral artery    TOTAL REDUCTION MAMMOPLASTY      TUBAL LIGATION         Review of patient's allergies indicates:   Allergen Reactions    Lisinopril Other (See Comments)     Angioedema      Vicodin [hydrocodone-acetaminophen] Rash     No problem with acetaminophen        No current facility-administered medications on file prior to encounter.      Current Outpatient Medications on File Prior to Encounter   Medication Sig    atorvastatin (LIPITOR) 40 MG tablet TAKE 1 TABLET ONE TIME DAILY FOR CHOLESTEROL    bisacodyl (DULCOLAX) 10 mg Supp Place 1 suppository (10 mg total) rectally daily as needed.    clopidogrel (PLAVIX) 75 mg tablet Take 1 tablet (75 mg total) by mouth once daily.    ergocalciferol (ERGOCALCIFEROL) 50,000 unit Cap Take 1 capsule (50,000 Units total) by mouth every 7 days.     "famotidine (PEPCID) 20 MG tablet Take 1 tablet (20 mg total) by mouth once daily.    FLUoxetine 10 MG capsule Take 1 capsule (10 mg total) by mouth once daily.    insulin (LANTUS SOLOSTAR U-100 INSULIN) glargine 100 units/mL (3mL) SubQ pen Inject 14 units at night, cut back to 12 units if sugar is < 120.    insulin aspart U-100 (NOVOLOG U-100 INSULIN ASPART) 100 unit/mL injection Use correction scale 180-230+1, 231-280+2, 281-330+3, 331-380+4, >380+5, max 15 units.    insulin glargine (LANTUS U-100 INSULIN) 100 unit/mL injection Inject 14 units at night. Cut back to 12 units if sugar is < 120.    levETIRAcetam (KEPPRA) 1000 MG tablet Take 1 tablet (1,000 mg total) by mouth once daily.    levETIRAcetam (KEPPRA) 500 MG Tab Take 1 tablet (500 mg total) by mouth every Mon, Wed, Fri. Monday Wednesday and Friday after DIALYSIS take additional 500mg    LORazepam (ATIVAN) 0.5 MG tablet 1 tab 1 hour prior to Dialysis    ondansetron (ZOFRAN-ODT) 4 MG TbDL Take 1 tablet (4 mg total) by mouth every 8 (eight) hours as needed.    polyethylene glycol (MIRALAX) 17 gram/dose powder Take 17 g by mouth 2 (two) times daily.    pregabalin (LYRICA) 25 MG capsule Take 1 capsule (25 mg total) by mouth once daily. May take additional dose after HD.    acetaminophen (TYLENOL) 325 MG tablet Take 2 tablets (650 mg total) by mouth every 6 (six) hours as needed for Pain.    albuterol (VENTOLIN HFA) 90 mcg/actuation inhaler Inhale 2 puffs into the lungs every 6 (six) hours as needed for Wheezing. Rescue    aspirin (ECOTRIN) 81 MG EC tablet Take 81 mg by mouth every morning.     BD INSULIN PEN NEEDLE UF SHORT 31 gauge x 5/16" Ndle USE TO INJECT NOVOLOG FLEXPEN BEFORE MEALS    blood sugar diagnostic Strp To check BG 4  times daily, to use with insurance preferred meter-true metrix    blood-glucose meter kit To check BG 4 times daily, to use with insurance preferred meter-true metrix    folic acid (FOLVITE) 1 MG tablet Take 1 tablet " "(1 mg total) by mouth once daily.    glipiZIDE (GLUCOTROL) 2.5 MG TR24 Take 1 tablet (hold if less than 150) at lunch (non dialysis days)    lidocaine (LIDODERM) 5 % Place 1 patch onto the skin once daily. Remove & Discard patch within 12 hours or as directed by MD    pen needle, diabetic (BD EMI 2ND GEN PEN NEEDLE) 32 gauge x 5/32" Ndle Uses once a day, 90 day    sevelamer carbonate (RENVELA) 800 mg Tab Take 800 mg by mouth 3 (three) times daily with meals.    TRUEPLUS LANCETS 33 gauge Misc      Family History     Problem Relation (Age of Onset)    Bell's palsy Sister    Blindness Maternal Grandmother    Breast cancer Mother    Diabetes Mother, Son,     Heart attack Mother    Heart disease Mother    Hypertension Mother, Sister    Lupus Sister    No Known Problems Daughter    Sleep apnea Sister    Stroke Sister, Maternal Aunt        Tobacco Use    Smoking status: Never Smoker    Smokeless tobacco: Never Used   Substance and Sexual Activity    Alcohol use: Yes     Comment: occasional wine cooler     Drug use: Never    Sexual activity: Yes     Partners: Male     Birth control/protection: Post-menopausal     Review of Systems   Constitutional: Negative for fatigue and fever.   HENT: Negative for congestion, postnasal drip and rhinorrhea.    Respiratory: Negative for cough, chest tightness, shortness of breath and wheezing.    Cardiovascular: Negative for chest pain, palpitations and leg swelling.   Gastrointestinal: Negative for abdominal pain, diarrhea, nausea and vomiting.   Genitourinary: Positive for decreased urine volume (ESRD). Negative for dysuria and hematuria.   Musculoskeletal: Positive for arthralgias, back pain (chronic) and gait problem (wheelchair bound since CVA).   Skin: Negative for rash and wound.   Neurological: Positive for weakness (chronic L sided weakness s/p CVA). Negative for dizziness, syncope and headaches.   Psychiatric/Behavioral: Negative for agitation. The patient is not " nervous/anxious.      Objective:     Vital Signs (Most Recent):  Temp: 98.6 °F (37 °C) (01/18/20 1817)  Pulse: 78 (01/18/20 1817)  Resp: 18 (01/18/20 1817)  BP: (!) 157/73 (01/18/20 1817)  SpO2: 96 % (01/18/20 1817) Vital Signs (24h Range):  Temp:  [98.6 °F (37 °C)] 98.6 °F (37 °C)  Pulse:  [70-78] 78  Resp:  [16-18] 18  SpO2:  [96 %-99 %] 96 %  BP: (157-197)/(73-81) 157/73     Weight: 92 kg (202 lb 13.2 oz)  Body mass index is 35.93 kg/m².    Physical Exam   Constitutional: She is oriented to person, place, and time. She appears well-developed and well-nourished. No distress.   HENT:   Head: Normocephalic and atraumatic.   Eyes: EOM are normal. Right eye exhibits no discharge. Left eye exhibits no discharge.   Neck: Normal range of motion. Neck supple. No JVD present.   Cardiovascular: Normal rate, regular rhythm and normal heart sounds.   No murmur heard.  Palpable thrill RUE AVG   Pulmonary/Chest: Effort normal. No respiratory distress. She has no wheezes. She has no rales.   Abdominal: Soft. Bowel sounds are normal. She exhibits no distension. There is no tenderness.   Musculoskeletal: She exhibits no edema or tenderness.   Neurological: She is alert and oriented to person, place, and time.   Chronic L sided hemiparesis   Skin: Skin is warm and dry. She is not diaphoretic.   Psychiatric: She has a normal mood and affect. Her behavior is normal. Judgment and thought content normal.   Nursing note and vitals reviewed.        CRANIAL NERVES     CN III, IV, VI   Extraocular motions are normal.        Significant Labs:   CBC:   Recent Labs   Lab 01/18/20  1547   WBC 6.36   HGB 10.2*   HCT 33.6*        CMP:   Recent Labs   Lab 01/18/20 1811      K 4.9      CO2 27   *   BUN 51*   CREATININE 7.3*   CALCIUM 9.4   PROT 7.2   ALBUMIN 3.6   BILITOT 0.3   ALKPHOS 115   AST 11   ALT 6*   ANIONGAP 14   EGFRNONAA 5.5*       Significant Imaging: I have reviewed all pertinent imaging results/findings  within the past 24 hours.

## 2020-01-19 NOTE — SUBJECTIVE & OBJECTIVE
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    Cortical cataract of both eyes 7/26/2016    Decubitus ulcer of buttock, stage 2     Degeneration of lumbar or lumbosacral intervertebral disc 3/5/2013    S/p MRI L-spine 5/2009     Depression     Encounter for blood transfusion     ESRD on hemodialysis     Started August 2018    General anesthetics causing adverse effect in therapeutic use     Hemorrhagic cerebrovascular accident (CVA)     8/2016 s/p Hemicraniotomy at [facility] [place] with Left hemiparesis    History of stroke 6/28/2017    s/p R-MCA stroke with R-putaminal hemorrhagic transformation in 8/2016 and 11/2016 (s/p hemicraniotomy at [facility] [place]) with residual L hemiparesis, on AED s/p CVA      Hypertensive retinopathy of both eyes 7/26/2016    Impingement syndrome of right shoulder 7/2/2014    Obesity     THERESA (obstructive sleep apnea) 3/5/2013    No Home CPAP 2ndary to cost     Partial symptomatic epilepsy with complex partial seizures, not intractable, without status epilepticus     Rheumatoid arthritis(714.0)     Rotator cuff tear 7/2/2014    Sarcoidosis     Stroke 2016    left sided flaccidity, SAH    Vertebral artery stenosis 3/5/2013    S/p Stenting per Dr Burnett        Past Surgical History:   Procedure Laterality Date    BREAST SURGERY      breast reduction    COLONOSCOPY N/A 8/11/2016    Procedure: COLONOSCOPY;  Surgeon: Jerry Vilchis MD;  Location: [address]);  Service: Endoscopy;  Laterality: N/A;  Patient reports difficulty awaking from anesthesia in the past.    DECLOTTING OF VASCULAR GRAFT Right 6/20/2019    Procedure: DECLOT-GRAFT;  Surgeon: Cabrera IBARRA  MD Alida;  Location: [facility] CATH LAB;  Service: Cardiology;  Laterality: Right;    DECLOTTING OF VASCULAR GRAFT Right 12/6/2019    Procedure: DECLOT-GRAFT;  Surgeon: Cabrera Irwin MD;  Location: [address];  Service: Peripheral Vascular;  Laterality: Right;    FISTULOGRAM Right 2/11/2019    Procedure: Fistulogram;  Surgeon: Meir Valencia MD;  Location: [facility] CATH LAB;  Service: Peripheral Vascular;  Laterality: Right;    FISTULOGRAM Right 7/8/2019    Procedure: Fistulogram;  Surgeon: WELLINGTON Palm III, MD;  Location: [facility] CATH LAB;  Service: Peripheral Vascular;  Laterality: Right;    FISTULOGRAM Right 12/6/2019    Procedure: Fistulogram;  Surgeon: Cabrera Irwin MD;  Location: [address];  Service: Peripheral Vascular;  Laterality: Right;    HYSTERECTOMY  1999    JOSE LUIS/BSO (AUB)    PLACEMENT OF ARTERIOVENOUS GRAFT Right 10/18/2018    Procedure: AV GRAFT CREATION;  Surgeon: Meir Valencia MD;  Location: [address];  Service: Cardiovascular;  Laterality: Right;    ROTATOR CUFF REPAIR Right July 9, 2014    right side    Skull surgery      Aneurysm    stent placed      in vertebral artery    TOTAL REDUCTION MAMMOPLASTY      TUBAL LIGATION         Review of patient's allergies indicates:   Allergen Reactions    Lisinopril Other (See Comments)     Angioedema      Vicodin [hydrocodone-acetaminophen] Rash     No problem with acetaminophen      Current Facility-Administered Medications   Medication Frequency    acetaminophen tablet 650 mg Q4H PRN    albuterol-ipratropium 2.5 mg-0.5 mg/3 mL nebulizer solution 3 mL Q4H PRN    aspirin EC tablet 81 mg QAM    atorvastatin tablet 40 mg QHS    bisacodyl suppository 10 mg Daily PRN    clopidogrel tablet 75 mg Daily    dextrose 10% (D10W) Bolus PRN    dextrose 10% (D10W) Bolus PRN    famotidine tablet 20 mg Daily    FLUoxetine capsule 10 mg Daily    folic acid tablet 1 mg Daily    glucagon (human recombinant) injection 1 mg  PRN    glucose chewable tablet 16 g PRN    glucose chewable tablet 24 g PRN    heparin (porcine) injection 5,000 Units Q8H    hydrALAZINE tablet 25 mg Q8H PRN    insulin aspart U-100 pen 0-5 Units QID (AC + HS) PRN    insulin aspart U-100 pen 4 Units TIDWM    insulin detemir U-100 pen 13 Units QHS    levETIRAcetam tablet 1,000 mg Daily    levETIRAcetam tablet 500 mg Daily PRN    lidocaine 5 % patch 1 patch Q24H    melatonin tablet 6 mg Nightly PRN    ondansetron disintegrating tablet 8 mg Q8H PRN    polyethylene glycol packet 17 g Daily    pregabalin capsule 25 mg Daily    promethazine (PHENERGAN) 6.25 mg in dextrose 5 % 50 mL IVPB Q6H PRN    senna tablet 8.6 mg QHS    sevelamer carbonate tablet 800 mg TID WM    sodium chloride 0.9% flush 10 mL PRN    sodium chloride 0.9% flush 5 mL PRN     Family History     Problem Relation (Age of Onset)    Bell's palsy Sister    Blindness Maternal Grandmother    Breast cancer Mother    Diabetes Mother, Son,     Heart attack Mother    Heart disease Mother    Hypertension Mother, Sister    Lupus Sister    No Known Problems Daughter    Sleep apnea Sister    Stroke Sister, Maternal Aunt        Tobacco Use    Smoking status: Never Smoker    Smokeless tobacco: Never Used   Substance and Sexual Activity    Alcohol use: Yes     Comment: occasional wine cooler     Drug use: Never    Sexual activity: Yes     Partners: Male     Birth control/protection: Post-menopausal     Review of Systems   Cardiovascular:        RUE AVF edema/tenderness     Objective:     Vital Signs (Most Recent):  Temp: 97.1 °F (36.2 °C) (01/19/20 1234)  Pulse: 69 (01/19/20 1234)  Resp: 16 (01/19/20 1234)  BP: (!) 183/72 (01/19/20 1234)  SpO2: 99 % (01/19/20 1234)  O2 Device (Oxygen Therapy): room air (01/18/20 1817) Vital Signs (24h Range):  Temp:  [96.6 °F (35.9 °C)-98.6 °F (37 °C)] 97.1 °F (36.2 °C)  Pulse:  [67-78] 69  Resp:  [14-24] 16  SpO2:  [96 %-99 %] 99 %  BP: (120-183)/(61-85) 183/72      Weight: 92 kg (202 lb 13.2 oz) (01/18/20 1242)  Body mass index is 35.93 kg/m².  Body surface area is 2.02 meters squared.    I/O last 3 completed shifts:  In: 240 [P.O.:240]  Out: -     Physical Exam   Constitutional: She is oriented to person, place, and time. She appears well-developed and well-nourished. No distress.   HENT:   Head: Normocephalic and atraumatic.   Eyes: EOM are normal. Right eye exhibits no discharge. Left eye exhibits no discharge.   Neck: Normal range of motion. Neck supple. No JVD present.   Cardiovascular: Normal rate, regular rhythm and normal heart sounds.   No murmur heard.  Edematous AVG on RUE    Pulmonary/Chest: Effort normal. No respiratory distress. She has no wheezes. She has no rales.   Abdominal: Soft. Bowel sounds are normal. She exhibits no distension. There is no tenderness.   Musculoskeletal: She exhibits no edema or tenderness.   Neurological: She is alert and oriented to person, place, and time.   Chronic L sided hemiparesis   Skin: Skin is warm and dry. She is not diaphoretic.   Psychiatric: She has a normal mood and affect. Her behavior is normal. Judgment and thought content normal.   Nursing note and vitals reviewed.      Significant Labs:  CBC:   Recent Labs   Lab 01/19/20  1530   WBC 6.01   RBC 3.27*   HGB 10.3*   HCT 32.5*      MCV 99*   MCH 31.5*   MCHC 31.7*     CMP:   Recent Labs   Lab 01/18/20  1811 01/19/20  0529   * 99   CALCIUM 9.4 9.3   ALBUMIN 3.6 3.4*   PROT 7.2  --     143   K 4.9 4.7   CO2 27 24    103   BUN 51* 60*   CREATININE 7.3* 7.7*   ALKPHOS 115  --    ALT 6*  --    AST 11  --    BILITOT 0.3  --      All labs within the past 24 hours have been reviewed.

## 2020-01-19 NOTE — ASSESSMENT & PLAN NOTE
"· Issues with access at outpatient dialysis center, "clots kept coming out"  · Ultrasound: "Satisfactory appearance of the right upper extremity vascular graft. Small fluid collection adjacent to the arterial aspect of the graft demonstrates a nonspecific appearance possibly representing a hematoma or seroma.  Abscess would be included in the differential diagnosis, however lack of surrounding inflammatory change or hyperemia makes this less likely."  · Vascular surgery consulted, appreciate recs  · May have been user error at dialysis center  · If any difficulties with HD at [facility], will obtain vascular lab quantitative US to eval for any treatable lesions  · Nephrology consulted for HD management  · Unable to get HD today, scheduled for tomorrow  "

## 2020-01-19 NOTE — ASSESSMENT & PLAN NOTE
- not on antihypertensives  - last 3 BP readings at PCP low-normal  - elevated during stay, will monitor after HD and add agent if necessary

## 2020-01-19 NOTE — HPI
"Lucy Perez is a 59F with ESRD on hemodialysis Tuesday, Thursday, Saturday, recent admission December 2019 for AV graft clot, hypertension, diastolic heart failure, insulin-dependent diabetes, central pain syndrome, history of CVA with residual left-sided weakness, wheelchair bound, rheumatoid arthritis, sarcoidosis, seizure, pulmonary hypertension, anemia, anxiety who presents for evaluation of fistula access problems - "clots kept coming out". Patient states that outside dialysis center was unable to access fistula.  Bruit and thrill were present on exam.  Multiple attempts failed and she was sent to the ED for evaluation.  She denies shortness of breath, chest pain, fevers.     ED: AFVSS, no leukocytosis, CXR w/o edema, BP controlled, K 4.9, US with "satisfactory appearance of the right upper extremity vascular graft. Small fluid collection adjacent to the arterial aspect of the graft demonstrates a nonspecific appearance possibly representing a hematoma or seroma.  Abscess would be included in the differential diagnosis, however lack of surrounding inflammatory change or hyperemia makes this less likely."  "

## 2020-01-20 VITALS
BODY MASS INDEX: 35.93 KG/M2 | HEIGHT: 63 IN | DIASTOLIC BLOOD PRESSURE: 54 MMHG | RESPIRATION RATE: 18 BRPM | OXYGEN SATURATION: 94 % | TEMPERATURE: 98 F | HEART RATE: 67 BPM | SYSTOLIC BLOOD PRESSURE: 148 MMHG | WEIGHT: 202.81 LBS

## 2020-01-20 LAB
ALBUMIN SERPL BCP-MCNC: 3.4 G/DL (ref 3.5–5.2)
ANION GAP SERPL CALC-SCNC: 12 MMOL/L (ref 8–16)
BASOPHILS # BLD AUTO: 0.04 K/UL (ref 0–0.2)
BASOPHILS NFR BLD: 0.7 % (ref 0–1.9)
BUN SERPL-MCNC: 73 MG/DL (ref 8–23)
CALCIUM SERPL-MCNC: 9 MG/DL (ref 8.7–10.5)
CHLORIDE SERPL-SCNC: 101 MMOL/L (ref 95–110)
CO2 SERPL-SCNC: 28 MMOL/L (ref 23–29)
CREAT SERPL-MCNC: 9 MG/DL (ref 0.5–1.4)
DIFFERENTIAL METHOD: ABNORMAL
EOSINOPHIL # BLD AUTO: 0.2 K/UL (ref 0–0.5)
EOSINOPHIL NFR BLD: 2.8 % (ref 0–8)
ERYTHROCYTE [DISTWIDTH] IN BLOOD BY AUTOMATED COUNT: 14.6 % (ref 11.5–14.5)
EST. GFR  (AFRICAN AMERICAN): 4.9 ML/MIN/1.73 M^2
EST. GFR  (NON AFRICAN AMERICAN): 4.3 ML/MIN/1.73 M^2
GLUCOSE SERPL-MCNC: 82 MG/DL (ref 70–110)
HCT VFR BLD AUTO: 31.7 % (ref 37–48.5)
HGB BLD-MCNC: 10 G/DL (ref 12–16)
IMM GRANULOCYTES # BLD AUTO: 0.02 K/UL (ref 0–0.04)
IMM GRANULOCYTES NFR BLD AUTO: 0.3 % (ref 0–0.5)
LYMPHOCYTES # BLD AUTO: 1.2 K/UL (ref 1–4.8)
LYMPHOCYTES NFR BLD: 21.3 % (ref 18–48)
MCH RBC QN AUTO: 32.1 PG (ref 27–31)
MCHC RBC AUTO-ENTMCNC: 31.5 G/DL (ref 32–36)
MCV RBC AUTO: 102 FL (ref 82–98)
MONOCYTES # BLD AUTO: 0.5 K/UL (ref 0.3–1)
MONOCYTES NFR BLD: 8.4 % (ref 4–15)
NEUTROPHILS # BLD AUTO: 3.8 K/UL (ref 1.8–7.7)
NEUTROPHILS NFR BLD: 66.5 % (ref 38–73)
NRBC BLD-RTO: 0 /100 WBC
PHOSPHATE SERPL-MCNC: 4.2 MG/DL (ref 2.7–4.5)
PLATELET # BLD AUTO: 201 K/UL (ref 150–350)
PMV BLD AUTO: 10.5 FL (ref 9.2–12.9)
POCT GLUCOSE: 84 MG/DL (ref 70–110)
POTASSIUM SERPL-SCNC: 4.8 MMOL/L (ref 3.5–5.1)
RBC # BLD AUTO: 3.12 M/UL (ref 4–5.4)
SODIUM SERPL-SCNC: 141 MMOL/L (ref 136–145)
WBC # BLD AUTO: 5.74 K/UL (ref 3.9–12.7)

## 2020-01-20 PROCEDURE — 25000003 PHARM REV CODE 250: Mod: HCNC,NTX | Performed by: INTERNAL MEDICINE

## 2020-01-20 PROCEDURE — 99217 PR OBSERVATION CARE DISCHARGE: ICD-10-PCS | Mod: HCNC,NTX,, | Performed by: PHYSICIAN ASSISTANT

## 2020-01-20 PROCEDURE — 80069 RENAL FUNCTION PANEL: CPT | Mod: HCNC,NTX

## 2020-01-20 PROCEDURE — G0257 UNSCHED DIALYSIS ESRD PT HOS: HCPCS | Mod: HCNC,NTX

## 2020-01-20 PROCEDURE — 63600175 PHARM REV CODE 636 W HCPCS: Mod: HCNC,NTX | Performed by: STUDENT IN AN ORGANIZED HEALTH CARE EDUCATION/TRAINING PROGRAM

## 2020-01-20 PROCEDURE — 25000003 PHARM REV CODE 250: Mod: HCNC,NTX | Performed by: PHYSICIAN ASSISTANT

## 2020-01-20 PROCEDURE — 36415 COLL VENOUS BLD VENIPUNCTURE: CPT | Mod: HCNC,NTX

## 2020-01-20 PROCEDURE — 85025 COMPLETE CBC W/AUTO DIFF WBC: CPT | Mod: HCNC,NTX

## 2020-01-20 PROCEDURE — 25000003 PHARM REV CODE 250: Mod: HCNC,NTX | Performed by: NURSE PRACTITIONER

## 2020-01-20 PROCEDURE — 82962 GLUCOSE BLOOD TEST: CPT | Mod: HCNC,NTX

## 2020-01-20 PROCEDURE — 99217 PR OBSERVATION CARE DISCHARGE: CPT | Mod: HCNC,NTX,, | Performed by: PHYSICIAN ASSISTANT

## 2020-01-20 PROCEDURE — 90935 HEMODIALYSIS ONE EVALUATION: CPT | Mod: HCNC,NTX

## 2020-01-20 PROCEDURE — G0378 HOSPITAL OBSERVATION PER HR: HCPCS | Mod: HCNC,NTX

## 2020-01-20 RX ORDER — FAMOTIDINE 40 MG/5ML
20 POWDER, FOR SUSPENSION ORAL DAILY
Status: DISCONTINUED | OUTPATIENT
Start: 2020-01-20 | End: 2020-01-20 | Stop reason: HOSPADM

## 2020-01-20 RX ORDER — SODIUM CHLORIDE 9 MG/ML
INJECTION, SOLUTION INTRAVENOUS ONCE
Status: CANCELLED | OUTPATIENT
Start: 2020-01-20

## 2020-01-20 RX ORDER — LIDOCAINE AND PRILOCAINE 25; 25 MG/G; MG/G
CREAM TOPICAL
Status: DISCONTINUED | OUTPATIENT
Start: 2020-01-20 | End: 2020-01-20 | Stop reason: HOSPADM

## 2020-01-20 RX ADMIN — FOLIC ACID 1 MG: 1 TABLET ORAL at 01:01

## 2020-01-20 RX ADMIN — SEVELAMER CARBONATE 800 MG: 800 TABLET, FILM COATED ORAL at 01:01

## 2020-01-20 RX ADMIN — SEVELAMER CARBONATE 800 MG: 800 TABLET, FILM COATED ORAL at 11:01

## 2020-01-20 RX ADMIN — FAMOTIDINE 20 MG: 40 POWDER, FOR SUSPENSION ORAL at 12:01

## 2020-01-20 RX ADMIN — POLYETHYLENE GLYCOL 3350 17 G: 17 POWDER, FOR SOLUTION ORAL at 01:01

## 2020-01-20 RX ADMIN — LIDOCAINE 1 PATCH: 50 PATCH TOPICAL at 01:01

## 2020-01-20 RX ADMIN — SODIUM CHLORIDE 1000 ML: 0.9 INJECTION, SOLUTION INTRAVENOUS at 09:01

## 2020-01-20 RX ADMIN — CLOPIDOGREL BISULFATE 75 MG: 75 TABLET ORAL at 01:01

## 2020-01-20 RX ADMIN — LIDOCAINE AND PRILOCAINE: 25; 25 CREAM TOPICAL at 07:01

## 2020-01-20 RX ADMIN — LEVETIRACETAM 1000 MG: 500 TABLET ORAL at 01:01

## 2020-01-20 NOTE — DISCHARGE SUMMARY
"[facility] Hospital Medicine  Discharge Summary      Patient Name: Lucy Perez  MRN: [id number]  Admission Date: 1/18/2020  Hospital Length of Stay: 0 days  Discharge Date and Time: 1/20/2020  2:39 PM  Attending Physician: Grecia att. providers found   Discharging Provider: Rachel Christensen PA-C  Primary Care Provider: Beverly Muniz MD  [facility] Medicine Team: [facility] Rachel Christensen PA-C    HPI:   Lucy Perez is a 59F with ESRD on hemodialysis Tuesday, Thursday, Saturday, recent admission December 2019 for AV graft clot, hypertension, diastolic heart failure, insulin-dependent diabetes, central pain syndrome, history of CVA with residual left-sided weakness, wheelchair bound, rheumatoid arthritis, sarcoidosis, seizure, pulmonary hypertension, anemia, anxiety who presents for evaluation of fistula access problems - "clots kept coming out". Patient states that outside dialysis center was unable to access fistula.  Bruit and thrill were present on exam.  Multiple attempts failed and she was sent to the ED for evaluation.  She denies shortness of breath, chest pain, fevers.     ED: AFVSS, no leukocytosis, CXR w/o edema, BP controlled, K 4.9, US with "satisfactory appearance of the right upper extremity vascular graft. Small fluid collection adjacent to the arterial aspect of the graft demonstrates a nonspecific appearance possibly representing a hematoma or seroma.  Abscess would be included in the differential diagnosis, however lack of surrounding inflammatory change or hyperemia makes this less likely."    * No surgery found *      Hospital Course:   Mrs. Perez was admitted to observation for clotted dialysis graft. Vascular surgery evaluated, suspect error user in, will attempt HD prior to further interventions.  Patient underwent HD successfully, no difficulty with access. Patient discharged, PCP follow up in 1 week. Patient verbalized understanding. All " "questions answered.     Consults:   Consults (From admission, onward)        Status Ordering Provider     Inpatient consult to Nephrology  Once     Provider:  (Not yet assigned)    Completed CAMERON LEONARDO     Inpatient consult to Vascular Surgery  Once     Provider:  (Not yet assigned)    Completed TEJAS MCCULLOUGH          * Problem with vascular access  · Issues with access at outpatient dialysis center, "clots kept coming out"  · Ultrasound: "Satisfactory appearance of the right upper extremity vascular graft. Small fluid collection adjacent to the arterial aspect of the graft demonstrates a nonspecific appearance possibly representing a hematoma or seroma.  Abscess would be included in the differential diagnosis, however lack of surrounding inflammatory change or hyperemia makes this less likely."  · Vascular surgery consulted, appreciate recs  · May have been user error at dialysis center  · If any difficulties with HD at [facility], will obtain vascular lab quantitative US to eval for any treatable lesions  · Nephrology consulted for HD management  · Patient underwent successful HD today  · PCP follow up 1 week      Final Active Diagnoses:    Diagnosis Date Noted POA    PRINCIPAL PROBLEM:  Problem with vascular access [Z78.9] 12/05/2019 Yes    ESRD (end stage renal disease) on dialysis [N18.6, Z99.2]  Not Applicable    Rheumatoid arthritis involving multiple sites [M06.9] 09/20/2018 Yes    Type 2 diabetes mellitus with chronic kidney disease on chronic dialysis, with long-term current use of insulin [E11.22, N18.6, Z99.2, Z79.4] 08/10/2018 Not Applicable    LEFT Hemiplegia as late effect of stroke [I69.359] 01/03/2018 Not Applicable    Essential hypertension [I10] 08/10/2017 Yes    Anemia in ESRD (end-stage renal disease) [N18.6, D63.1] 08/02/2017 Yes    Partial symptomatic epilepsy with complex partial seizures, not intractable, without status epilepticus [G40.209] 06/06/2017 Yes    Central pain " syndrome [G89.0] 10/22/2016 Yes    Pulmonary hypertension [I27.20] 12/15/2015 Yes    Sarcoidosis [D86.9] 11/26/2013 Yes     Chronic      Problems Resolved During this Admission:       Discharged Condition: good    Disposition: Home or Self Care    Follow Up:  Follow-up Information     Beverly Muniz MD In 1 week.    Specialty:  Internal Medicine  Contact information:  Janak MAHMOOD  [facility] [id number] [phone]                 Patient Instructions:      Diet renal     Diet diabetic     Notify your health care provider if you experience any of the following:  temperature >100.4     Notify your health care provider if you experience any of the following:  severe uncontrolled pain     Activity as tolerated       Significant Diagnostic Studies: Labs:   CMP   Recent Labs   Lab 01/18/20  1811 01/19/20  0529 01/20/20  0852    143 141   K 4.9 4.7 4.8    103 101   CO2 27 24 28   * 99 82   BUN 51* 60* 73*   CREATININE 7.3* 7.7* 9.0*   CALCIUM 9.4 9.3 9.0   PROT 7.2  --   --    ALBUMIN 3.6 3.4* 3.4*   BILITOT 0.3  --   --    ALKPHOS 115  --   --    AST 11  --   --    ALT 6*  --   --    ANIONGAP 14 16 12   ESTGFRAFRICA 6.4* 6.0* 4.9*   EGFRNONAA 5.5* 5.2* 4.3*    and CBC   Recent Labs   Lab 01/18/20  1547 01/19/20  1530 01/20/20  0852   WBC 6.36 6.01 5.74   HGB 10.2* 10.3* 10.0*   HCT 33.6* 32.5* 31.7*    195 201       Pending Diagnostic Studies:     None         Medications:  Reconciled Home Medications:      Medication List      CONTINUE taking these medications    acetaminophen 325 MG tablet  Commonly known as:  TYLENOL  Take 2 tablets (650 mg total) by mouth every 6 (six) hours as needed for Pain.     albuterol 90 mcg/actuation inhaler  Commonly known as:  Ventolin HFA  Inhale 2 puffs into the lungs every 6 (six) hours as needed for Wheezing. Rescue     aspirin 81 MG EC tablet  Commonly known as:  ECOTRIN  Take 81 mg by mouth every morning.     atorvastatin 40 MG tablet  Commonly  "known as:  LIPITOR  TAKE 1 TABLET ONE TIME DAILY FOR CHOLESTEROL     * BD Ultra-Fine Short Pen Needle 31 gauge x 5/16" Ndle  Generic drug:  pen needle, diabetic  USE TO INJECT NOVOLOG FLEXPEN BEFORE MEALS     * pen needle, diabetic 32 gauge x 5/32" Ndle  Commonly known as:  BD Cristal 2nd Gen Pen Needle  Uses once a day, 90 day     bisacodyl 10 mg Supp  Commonly known as:  DULCOLAX  Place 1 suppository (10 mg total) rectally daily as needed.     blood sugar diagnostic Strp  To check BG 4  times daily, to use with insurance preferred meter-true metrix     blood-glucose meter kit  To check BG 4 times daily, to use with insurance preferred meter-true metrix     clopidogrel 75 mg tablet  Commonly known as:  PLAVIX  Take 1 tablet (75 mg total) by mouth once daily.     ergocalciferol 50,000 unit Cap  Commonly known as:  ERGOCALCIFEROL  Take 1 capsule (50,000 Units total) by mouth every 7 days.     famotidine 20 MG tablet  Commonly known as:  PEPCID  Take 1 tablet (20 mg total) by mouth once daily.     FLUoxetine 10 MG capsule  Take 1 capsule (10 mg total) by mouth once daily.     folic acid 1 MG tablet  Commonly known as:  FOLVITE  Take 1 tablet (1 mg total) by mouth once daily.     glipiZIDE 2.5 MG Tr24  Commonly known as:  GLUCOTROL  Take 1 tablet (hold if less than 150) at lunch (non dialysis days)     insulin aspart U-100 100 unit/mL injection  Commonly known as:  NovoLOG U-100 Insulin aspart  Use correction scale 180-230+1, 231-280+2, 281-330+3, 331-380+4, >380+5, max 15 units.     * insulin glargine 100 unit/mL injection  Commonly known as:  Lantus U-100 Insulin  Inject 14 units at night. Cut back to 12 units if sugar is < 120.     * insulin glargine 100 units/mL (3mL) SubQ pen  Commonly known as:  Lantus Solostar U-100 Insulin  Inject 14 units at night, cut back to 12 units if sugar is < 120.     * levETIRAcetam 1000 MG tablet  Commonly known as:  KEPPRA  Take 1 tablet (1,000 mg total) by mouth once daily.     * " levETIRAcetam 500 MG Tab  Commonly known as:  KEPPRA  Take 1 tablet (500 mg total) by mouth every Mon, Wed, Fri. Monday Wednesday and Friday after DIALYSIS take additional 500mg     lidocaine 5 %  Commonly known as:  LIDODERM  Place 1 patch onto the skin once daily. Remove & Discard patch within 12 hours or as directed by MD     LORazepam 0.5 MG tablet  Commonly known as:  ATIVAN  1 tab 1 hour prior to Dialysis     ondansetron 4 MG Tbdl  Commonly known as:  ZOFRAN-ODT  Take 1 tablet (4 mg total) by mouth every 8 (eight) hours as needed.     polyethylene glycol 17 gram/dose powder  Commonly known as:  Miralax  Take 17 g by mouth 2 (two) times daily.     pregabalin 25 MG capsule  Commonly known as:  LYRICA  Take 1 capsule (25 mg total) by mouth once daily. May take additional dose after HD.     sevelamer carbonate 800 mg Tab  Commonly known as:  RENVELA  Take 800 mg by mouth 3 (three) times daily with meals.     TRUEplus Lancets 33 gauge Misc  Generic drug:  lancets         * This list has 6 medication(s) that are the same as other medications prescribed for you. Read the directions carefully, and ask your doctor or other care provider to review them with you.                Indwelling Lines/Drains at time of discharge:   Lines/Drains/Airways     Drain                 Hemodialysis AV Graft Right upper arm -- days    Female External Urinary Catheter 01/19/20 2687 less than 1 day                Time spent on the discharge of patient: 36 minutes  Patient was seen and examined on the date of discharge and determined to be suitable for discharge.         Rachel Christensen PA-C  Department of Hospital Medicine  [facility]

## 2020-01-20 NOTE — NURSING
Patient discharged to home. IV removed,discharge instructions given. Patient verbalized her understanding.  Patient has taken all personal belongings. Patient escorted to car in a wheelchair by escort and spouse.

## 2020-01-20 NOTE — ASSESSMENT & PLAN NOTE
"· Issues with access at outpatient dialysis center, "clots kept coming out"  · Ultrasound: "Satisfactory appearance of the right upper extremity vascular graft. Small fluid collection adjacent to the arterial aspect of the graft demonstrates a nonspecific appearance possibly representing a hematoma or seroma.  Abscess would be included in the differential diagnosis, however lack of surrounding inflammatory change or hyperemia makes this less likely."  · Vascular surgery consulted, appreciate recs  · May have been user error at dialysis center  · If any difficulties with HD at [facility], will obtain vascular lab quantitative US to eval for any treatable lesions  · Nephrology consulted for HD management  · Patient underwent successful HD today  · PCP follow up 1 week  "

## 2020-01-20 NOTE — PLAN OF CARE
01/20/20 1326   Final Note   Assessment Type Final Discharge Note   Anticipated Discharge Disposition Home   What phone number can be called within the next 1-3 days to see how you are doing after discharge? [phone]   Discharge plans and expectations educations in teach back method with documentation complete? Yes   Right Care Referral Info   Post Acute Recommendation No Care     Future Appointments   Date Time Provider Department Center   1/23/2020  7:30 PM LAB, SLEEP [facility] [place] SLEEP [facility]   2/28/2020 11:00 AM Sendy Roberts NP [facility] C3[facility] [place]   3/13/2020  9:00 AM LAB, [facility] LAB [facility]   3/18/2020 11:00 AM VASCULAR, LAB [facility] MATTY Cade gina   3/18/2020 11:30 AM Meir Valencia MD [facility] MOUNA [facility]   3/20/2020 10:30 AM [facility]   4/17/2020 10:00 AM LAB, APPOINTMENT [place] [facility] VNP [facility]   4/24/2020 11:30 AM PARESH Lacy, FNP [facility] Rayo Britt [facility]

## 2020-01-20 NOTE — PROGRESS NOTES
[facility] HEMODIALYSIS NOTE     Patient currently on hemodialysis for removal of uremic toxins and volume.     Patient seen and evaluated on hemodialysis, tolerating treatment, see HD flowsheet for vitals and assessments.      Ultrafiltration goal is 2L as tolerated (unable to accurately weigh patient in bed)      Labs have been reviewed and the dialysate bath has been adjusted.     Assessment/Plan:    -Patient seen on HD, tolerating treatment well, w/o complaints  -Keep map >65 while on HD    -Renal diet  -Continue renvela   -Strict I/O's and daily weights  -Daily renal function panels  -Maintain Hgb >7.0  -Will continue to follow while inpatient       PARESH Malone, AGNP-C  Nephrology  Pager:  [phone]

## 2020-01-20 NOTE — PROGRESS NOTES
HD txt complete.  Needles removed, dressing applied.  3L removed. Tolerated well. BG 84 @ 1130. Pt had not had breakfast until 1130 when it arrived.  Pt refused insulin. Pt transported to room via transporters.

## 2020-01-20 NOTE — PLAN OF CARE
CM met with patient at the bedside to discuss discharge planning assessment. Pt lives with her  in a wheelchair accessible home, pts spouse will provide transportation at discharge.  Pt verified PCP and Pharmacy. CM will continue to follow for discharge needs.     HD at [facility] [address] T/TH/S     01/20/20 1323   Discharge Assessment   Assessment Type Discharge Planning Assessment   Confirmed/corrected address and phone number on facesheet? Yes   Assessment information obtained from? Patient   Expected Length of Stay (days) 2   Communicated expected length of stay with patient/caregiver yes   Prior to hospitilization cognitive status: Alert/Oriented   Prior to hospitalization functional status: Wheelchair Bound   Current cognitive status: Alert/Oriented   Current Functional Status: Wheelchair Bound   Lives With spouse   Able to Return to Prior Arrangements yes   Is patient able to care for self after discharge? Yes   Who are your caregiver(s) and their phone number(s)? [facility]- [phone]   Patient's perception of discharge disposition home or selfcare   Readmission Within the Last 30 Days no previous admission in last 30 days   Patient currently being followed by outpatient case management? No   Patient currently receives any other outside agency services? No   Equipment Currently Used at Home oxygen;wheelchair;lift device;hospital bed;bedside commode;glucometer   Do you have any problems affording any of your prescribed medications? No   Is the patient taking medications as prescribed? yes   Does the patient have transportation home? Yes   Transportation Anticipated family or friend will provide   Does the patient receive services at the [facility] Clinic? No   Discharge Plan A Home with family   Discharge Plan B Home with family   DME Needed Upon Discharge  none   Patient/Family in Agreement with Plan yes

## 2020-01-21 NOTE — ASSESSMENT & PLAN NOTE
"· Issues with access at outpatient dialysis center, "clots kept coming out"  · Ultrasound: "Satisfactory appearance of the right upper extremity vascular graft. Small fluid collection adjacent to the arterial aspect of the graft demonstrates a nonspecific appearance possibly representing a hematoma or seroma.  Abscess would be included in the differential diagnosis, however lack of surrounding inflammatory change or hyperemia makes this less likely."  · May have been user error at dialysis center  · Trial dialysis Sunday morning, if trouble continues, then further investigation  " 2-3x/week

## 2020-01-23 ENCOUNTER — HOSPITAL ENCOUNTER (OUTPATIENT)
Dept: SLEEP MEDICINE | Facility: OTHER | Age: 60
Discharge: HOME | End: 2020-01-23
Payer: MEDICARE

## 2020-01-23 DIAGNOSIS — G47.33 OSA (OBSTRUCTIVE SLEEP APNEA): ICD-10-CM

## 2020-01-23 PROCEDURE — 95811 PR POLYSOMNOGRAPHY W/CPAP: ICD-10-PCS | Mod: 26,HCNC,NTX, | Performed by: INTERNAL MEDICINE

## 2020-01-23 PROCEDURE — 95811 POLYSOM 6/>YRS CPAP 4/> PARM: CPT | Mod: HCNC,TXP

## 2020-01-23 PROCEDURE — 95811 POLYSOM 6/>YRS CPAP 4/> PARM: CPT | Mod: 26,HCNC,NTX, | Performed by: INTERNAL MEDICINE

## 2020-01-24 NOTE — PROGRESS NOTES
Lucy Perez to [facility] on 1/24/2020 for an overnight CPAP titration study. Pt educated on setup and CPAP and answered all of patients questions prior to the start of the study. Pt's son stayed in the other bed in room. Pt has Hx of stroke and anyuresm.     No snoring observed. Pt  wearing a [employer] Dreamwear FFM with heated humidity and CFLEX. Pressures explored from 4cm-8cm H2O. When pt woke up to use restroom, she complained the pressure was too high and it her nostrils were sensitive from the mask. I adjusted the pressure lower and repositioned mask.    EKG Lead I appears with PAC's and PVC's.     In AM pt stated mask was okay, throughout the night it became uncomfortable a few times.    Post study information given to pt in AM.

## 2020-01-27 ENCOUNTER — TELEPHONE (OUTPATIENT)
Dept: INTERNAL MEDICINE | Facility: CLINIC | Age: 60
End: 2020-01-27

## 2020-01-27 NOTE — TELEPHONE ENCOUNTER
Aura Rosales was not able to tell me whe the pt's Nephrologist was. I called the pt to find out, Dr. Iván Bo is the Nephrologist she is seeing.

## 2020-01-27 NOTE — TELEPHONE ENCOUNTER
Isaac, pt has had problems with her AV access for HD and now dialyzes through [facility], but I can't tell who her Nephrologist is nor does she know.  Could you please call [facility] [employer] in Nephrology and ask who it is so I can message that doctor.  Thanks

## 2020-01-27 NOTE — TELEPHONE ENCOUNTER
[facility] [address]. Apparently she is seeing Dr Iván Bo.  Isaac, please ask [facility] if this is an [facility] doc.  Thanks

## 2020-01-29 ENCOUNTER — TELEPHONE (OUTPATIENT)
Dept: SURGERY | Facility: CLINIC | Age: 60
End: 2020-01-29

## 2020-01-29 NOTE — TELEPHONE ENCOUNTER
Spoke with pt's daughter, Reina as pt requested and scheduled an appt with Cardiology to obtain cardiac clearance prior to lap PD catheter placement. Appt slip and cardiac clearance sheet will be picked up by Reina on 1/30/2020. Date, time and location confirmed by pt's daughter.

## 2020-02-07 ENCOUNTER — TELEPHONE (OUTPATIENT)
Dept: INTERNAL MEDICINE | Facility: CLINIC | Age: 60
End: 2020-02-07

## 2020-02-07 ENCOUNTER — PATIENT OUTREACH (OUTPATIENT)
Dept: ADMINISTRATIVE | Facility: OTHER | Age: 60
End: 2020-02-07

## 2020-02-08 NOTE — TELEPHONE ENCOUNTER
Bogdan Valencia  I lost the message you sent me on this pt.  Was her CPAP Titration read yet? Does she need to be seen in Sleep Clinic to qualify for Home CPAP or can I order it?  Sorry for the confusion, Beverly Muniz

## 2020-02-10 ENCOUNTER — OFFICE VISIT (OUTPATIENT)
Dept: CARDIOLOGY | Facility: CLINIC | Age: 60
End: 2020-02-10
Payer: MEDICARE

## 2020-02-10 VITALS
SYSTOLIC BLOOD PRESSURE: 156 MMHG | DIASTOLIC BLOOD PRESSURE: 68 MMHG | WEIGHT: 196.19 LBS | BODY MASS INDEX: 34.76 KG/M2 | HEIGHT: 63 IN | HEART RATE: 67 BPM

## 2020-02-10 DIAGNOSIS — T82.898D PROBLEM WITH DIALYSIS ACCESS, SUBSEQUENT ENCOUNTER: ICD-10-CM

## 2020-02-10 DIAGNOSIS — Z01.810 PREOPERATIVE CARDIOVASCULAR EXAMINATION: Primary | ICD-10-CM

## 2020-02-10 DIAGNOSIS — N18.6 ESRD (END STAGE RENAL DISEASE) ON DIALYSIS: ICD-10-CM

## 2020-02-10 DIAGNOSIS — Z79.4 TYPE 2 DIABETES MELLITUS WITH CHRONIC KIDNEY DISEASE ON CHRONIC DIALYSIS, WITH LONG-TERM CURRENT USE OF INSULIN: ICD-10-CM

## 2020-02-10 DIAGNOSIS — N18.6 TYPE 2 DIABETES MELLITUS WITH CHRONIC KIDNEY DISEASE ON CHRONIC DIALYSIS, WITH LONG-TERM CURRENT USE OF INSULIN: ICD-10-CM

## 2020-02-10 DIAGNOSIS — I10 ESSENTIAL HYPERTENSION: ICD-10-CM

## 2020-02-10 DIAGNOSIS — Z99.2 TYPE 2 DIABETES MELLITUS WITH CHRONIC KIDNEY DISEASE ON CHRONIC DIALYSIS, WITH LONG-TERM CURRENT USE OF INSULIN: ICD-10-CM

## 2020-02-10 DIAGNOSIS — E11.22 TYPE 2 DIABETES MELLITUS WITH CHRONIC KIDNEY DISEASE ON CHRONIC DIALYSIS, WITH LONG-TERM CURRENT USE OF INSULIN: ICD-10-CM

## 2020-02-10 DIAGNOSIS — Z99.2 ESRD (END STAGE RENAL DISEASE) ON DIALYSIS: ICD-10-CM

## 2020-02-10 DIAGNOSIS — Z98.61 CORONARY ANGIOPLASTY STATUS: ICD-10-CM

## 2020-02-10 DIAGNOSIS — E78.2 MIXED HYPERLIPIDEMIA: ICD-10-CM

## 2020-02-10 PROCEDURE — 99999 PR PBB SHADOW E&M-EST. PATIENT-LVL IV: ICD-10-PCS | Mod: PBBFAC,HCNC,TXP, | Performed by: INTERNAL MEDICINE

## 2020-02-10 PROCEDURE — 99499 UNLISTED E&M SERVICE: CPT | Mod: HCNC,S$GLB,, | Performed by: INTERNAL MEDICINE

## 2020-02-10 PROCEDURE — 99499 RISK ADDL DX/OHS AUDIT: ICD-10-PCS | Mod: HCNC,S$GLB,, | Performed by: INTERNAL MEDICINE

## 2020-02-10 PROCEDURE — 99999 PR PBB SHADOW E&M-EST. PATIENT-LVL IV: CPT | Mod: PBBFAC,HCNC,TXP, | Performed by: INTERNAL MEDICINE

## 2020-02-10 NOTE — PROGRESS NOTES
Subjective:   Chief Complaint: preop clearance  Last Clinic Visit: New Patient    History of Present Illness: Lucy Perez is a 59 y.o. lady with diabetes, ESRD on HD, CVA with residual hemiparesis, remote vertebral artery stenting 1998, recurrent AV fistula stenosis/thrombosis, coronary artery disease by calcification, hypertension, hyperlipidemia, who presents for preoperative evaluation prior to PD catheter placement.  She is predominantly wheelchair bound, thus unable to gauge exercise tolerance.  She denies any recent history of congestive heart failure, or MI.  She had a vertebral artery stent possibly after stroke in 1998 1999, was on single antiplatelet therapy for many years, and recently started on dual antiplatelet therapy several months ago by Dr. Valencia apparently after recurrent fistula issues.  She denies any chest pain, no significant new dyspnea on exertion, no orthopnea, no weight gain, no lower extremity edema, no PND.  She had a preoperative evaluation completed in September by Dr. Storey in the setting of kidney transplant listing.  She also had a negative stress test at that time.  She is here for preoperative evaluation prior to PD catheter placement.    Dx:  Diabetes  ESRD on HD  CVA  Vertebral artery stenting  Recurrent AV fistula thrombosis/stenosis  Hypertension  Hyperlipidemia  Coronary artery disease by calcification    Past medical and surgical history reviewed as documented.    Review of Systems   Constitution: Negative.   HENT: Negative.    Eyes: Negative.    Cardiovascular: Negative.    Respiratory: Positive for shortness of breath.    Hematologic/Lymphatic: Negative.    Skin: Negative.    Musculoskeletal: Positive for arthritis, back pain and muscle weakness.   Gastrointestinal: Negative.    Genitourinary: Negative.    Neurological: Positive for focal weakness.     Medications:  Outpatient Encounter Medications as of 2/10/2020   Medication Sig Dispense Refill     "acetaminophen (TYLENOL) 325 MG tablet Take 2 tablets (650 mg total) by mouth every 6 (six) hours as needed for Pain.  0    albuterol (VENTOLIN HFA) 90 mcg/actuation inhaler Inhale 2 puffs into the lungs every 6 (six) hours as needed for Wheezing. Rescue 18 g 0    aspirin (ECOTRIN) 81 MG EC tablet Take 81 mg by mouth every morning.       atorvastatin (LIPITOR) 40 MG tablet TAKE 1 TABLET ONE TIME DAILY FOR CHOLESTEROL 90 tablet 2    BD INSULIN PEN NEEDLE UF SHORT 31 gauge x 5/16" Ndle USE TO INJECT NOVOLOG FLEXPEN BEFORE MEALS 150 each 11    bisacodyl (DULCOLAX) 10 mg Supp Place 1 suppository (10 mg total) rectally daily as needed. 30 suppository 3    blood sugar diagnostic Strp To check BG 4  times daily, to use with insurance preferred meter-true metrix 400 each 3    blood-glucose meter kit To check BG 4 times daily, to use with insurance preferred meter-true metrix 1 each 1    clopidogrel (PLAVIX) 75 mg tablet Take 1 tablet (75 mg total) by mouth once daily. 120 tablet 6    ergocalciferol (ERGOCALCIFEROL) 50,000 unit Cap Take 1 capsule (50,000 Units total) by mouth every 7 days.      famotidine (PEPCID) 20 MG tablet Take 1 tablet (20 mg total) by mouth once daily. 90 tablet 2    FLUoxetine 10 MG capsule Take 1 capsule (10 mg total) by mouth once daily. 90 capsule 0    folic acid (FOLVITE) 1 MG tablet Take 1 tablet (1 mg total) by mouth once daily. 90 tablet 2    glipiZIDE (GLUCOTROL) 2.5 MG TR24 Take 1 tablet (hold if less than 150) at lunch (non dialysis days) 30 tablet 4    insulin (LANTUS SOLOSTAR U-100 INSULIN) glargine 100 units/mL (3mL) SubQ pen Inject 14 units at night, cut back to 12 units if sugar is < 120. 6 mL 6    insulin aspart U-100 (NOVOLOG U-100 INSULIN ASPART) 100 unit/mL injection Use correction scale 180-230+1, 231-280+2, 281-330+3, 331-380+4, >380+5, max 15 units. 3 vial 3    levETIRAcetam (KEPPRA) 1000 MG tablet Take 1 tablet (1,000 mg total) by mouth once daily. (Patient taking " "differently: Take 1,000 mg by mouth once daily. On non dialysis days) 90 tablet 3    levETIRAcetam (KEPPRA) 500 MG Tab Take 1 tablet (500 mg total) by mouth every Mon, Wed, Fri. Monday Wednesday and Friday after DIALYSIS take additional 500mg (Patient taking differently: Take 500 mg by mouth 2 (two) times daily. Twice daily on dialysis days) 36 tablet 3    LORazepam (ATIVAN) 0.5 MG tablet 1 tab 1 hour prior to Dialysis 30 tablet 0    ondansetron (ZOFRAN-ODT) 4 MG TbDL Take 1 tablet (4 mg total) by mouth every 8 (eight) hours as needed. 10 tablet 0    pen needle, diabetic (BD EMI 2ND GEN PEN NEEDLE) 32 gauge x 5/32" Ndle Uses once a day, 90 day 100 each 3    polyethylene glycol (MIRALAX) 17 gram/dose powder Take 17 g by mouth 2 (two) times daily. 1 Bottle 6    pregabalin (LYRICA) 25 MG capsule Take 1 capsule (25 mg total) by mouth once daily. May take additional dose after HD. 90 capsule 4    sevelamer carbonate (RENVELA) 800 mg Tab Take 800 mg by mouth 3 (three) times daily with meals.      TRUEPLUS LANCETS 33 gauge Misc       lidocaine (LIDODERM) 5 % Place 1 patch onto the skin once daily. Remove & Discard patch within 12 hours or as directed by MD (Patient not taking: Reported on 2/10/2020) 10 patch 1    [DISCONTINUED] insulin glargine (LANTUS U-100 INSULIN) 100 unit/mL injection Inject 14 units at night. Cut back to 12 units if sugar is < 120. 6 mL 6     No facility-administered encounter medications on file as of 2/10/2020.      Family History:  Lucy's family history includes Bell's palsy in her sister; Blindness in her maternal grandmother; Breast cancer in her mother; Diabetes in her mother, son, and unknown relative; Heart attack in her mother; Heart disease in her mother; Hypertension in her mother and sister; Lupus in her sister; No Known Problems in her daughter; Sleep apnea in her sister; Stroke in her maternal aunt and sister.    Social History:  Lucy reports that she has never smoked. She " "has never used smokeless tobacco. She reports that she drinks alcohol. She reports that she does not use drugs.    Objective:   BP (!) 156/68   Pulse 67   Ht 5' 3" (1.6 m)   Wt 89 kg (196 lb 3.4 oz)   LMP  (LMP Unknown)   BMI 34.76 kg/m²     Physical Exam   Constitutional: She is oriented to person, place, and time and well-developed, well-nourished, and in no distress. No distress.   Slightly debilitated middle-aged lady sitting in wheelchair   HENT:   Head: Normocephalic and atraumatic.   Mouth/Throat: No oropharyngeal exudate.   Eyes: No scleral icterus.   Neck: No JVD present. No tracheal deviation present. No thyromegaly present.   Cardiovascular: Normal rate and regular rhythm. Exam reveals no gallop and no friction rub.   Murmur heard.  Pulmonary/Chest: Effort normal and breath sounds normal. No respiratory distress. She has no wheezes. She has no rales. She exhibits no tenderness.   Abdominal: Soft. She exhibits no distension. There is no tenderness. There is no rebound and no guarding.   Musculoskeletal: Normal range of motion. She exhibits no edema.   Bilateral AV fistula scar palpable thrill right-sided   Neurological: She is alert and oriented to person, place, and time.   Left-sided residual deficits   Skin: Skin is warm and dry. She is not diaphoretic. No erythema.   Psychiatric: Affect normal.     EKG:  My independent visualization of most recent EKG is normal sinus rhythm    TTE:  As below  Lipids:  Recent Labs   Lab 08/29/19  1235   LDL Cholesterol 98.2   HDL 85 H   Cholesterol 200 H      Renal:  Recent Labs   Lab 01/20/20  0852   Creatinine 9.0 H   Potassium 4.8   CO2 28   BUN, Bld 73 H     Liver:  Recent Labs   Lab 01/18/20  1811   AST 11   ALT 6 L     09/05/2019  Stress echo  · The stress echo portion of this study is negative for myocardial ischemia.  · The EKG portion of this study is negative for myocardial ischemia.  · Arrhythmias during stress: rare PVCs,  · Normal left ventricular " systolic function. The estimated ejection fraction is 65%  · Concentric left ventricular remodeling.  · Indeterminate left ventricular diastolic function.  · No wall motion abnormalities.  · Severe left atrial enlargement.  · Normal right ventricular systolic function.  · Trivial circumferential pericardial effusion.   Assessment:     1. Preoperative cardiovascular examination    2. Coronary angioplasty status    3. Essential hypertension    4. Mixed hyperlipidemia    5. Problem with dialysis access, subsequent encounter    6. ESRD (end stage renal disease) on dialysis    7. Type 2 diabetes mellitus with chronic kidney disease on chronic dialysis, with long-term current use of insulin      Plan:   1. Preoperative cardiovascular examination  She is unable to exercise to greater than 4 Mets due to immobility from stroke.  Technically surgery is intraperitoneal, but consider this low to moderate risk surgery.  She has no active cardiac conditions, negative stress test 5 months ago reassuring.  She likely does have some obstructive atherosclerosis, possible CAD given other vascular issues.  Also with diabetes, ESRD, she is at a 15% risk of major adverse cardiac event, which includes isolated troponin elevation.  No additional preoperative testing indicated.    Plavix was started by Dr. Valencia last month for fistula issues which he manages.  From a cardiovascular perspective, as well as vertebral artery stent perspective see no reason why she could not have Plavix held for 5-7 days.  However given that he started this medication, would recommend double checking with him.    2. Coronary angioplasty status  Unclear whether she actually had any coronary stents, could not find record here in the last 10-15 years did have vertebral artery stent 20 years ago.    3. Essential hypertension  Will defer to Nephrology    4. Mixed hyperlipidemia  Continue statin    5. Problem with dialysis access, subsequent encounter      6. ESRD  (end stage renal disease) on dialysis      7. Type 2 diabetes mellitus with chronic kidney disease on chronic dialysis, with long-term current use of insulin    Follow up in about 6 months (around 8/10/2020), or if symptoms worsen or fail to improve.

## 2020-02-10 NOTE — LETTER
February 10, 2020      Paige Monsalve MD  [address] [facility] [address]           [facility]  [address]  [place] [address]  Phone: [phone]          Patient: Lucy Perez   MR Number: [id number]   YOB: 1960   Date of Visit: 2/10/2020       Dear Dr. Paige Monsalve:    Thank you for referring Lucy Perez to me for evaluation. Attached you will find relevant portions of my assessment and plan of care.    If you have questions, please do not hesitate to call me. I look forward to following Lucy Perez along with you.    Sincerely,    Will Correa MD    Enclosure  CC:  No Recipients    If you would like to receive this communication electronically, please contact [email or web address] or [phone] to request more information on [employer] [facility] access.    For providers and/or their staff who would like to refer a patient to [facility], please contact us through our one-stop-shop provider referral line, [facility], at [phone].    If you feel you have received this communication in error or would no longer like to receive these types of communications, please e-mail [email or web address]

## 2020-02-10 NOTE — TELEPHONE ENCOUNTER
Isaac, can you please try to find me the Sleep study report on Mrs Ayala from 1/23/20;( 'can't find it)  Thanks

## 2020-02-10 NOTE — Clinical Note
Plavix was started by Dr. Valencia last month for fistula issues which he manages.  From a cardiovascular perspective, as well as vertebral artery stent perspective see no reason why she could not have Plavix held for 5-7 days.  However given that he started this medication, would recommend double checking with him.

## 2020-02-14 ENCOUNTER — TELEPHONE (OUTPATIENT)
Dept: SURGERY | Facility: CLINIC | Age: 60
End: 2020-02-14

## 2020-02-14 ENCOUNTER — TELEPHONE (OUTPATIENT)
Dept: SLEEP MEDICINE | Facility: CLINIC | Age: 60
End: 2020-02-14

## 2020-02-14 DIAGNOSIS — T82.898A PROBLEM WITH DIALYSIS ACCESS, INITIAL ENCOUNTER: Primary | ICD-10-CM

## 2020-02-14 NOTE — TELEPHONE ENCOUNTER
Spoke with pt's daughter, Reina and scheduled sx for 3/9/2020 and informed her that pt is to hold Plavix 5 days before sx, which is 3/4/2020 and can resume the day after sx. Also informed her that pt can continue taking ASA 81 mg. Scheduled p/o appt and mailed reminder. Reina confirmed date and location. Went over pre-op instructions.  Understanding verbalized.    [FreeTextEntry1] : Health maintenance\par - comprehensive labs today\par - due for all screening exams\par - info given for GYN to make PAP appt\par - script for mammo given\par - FIT testing for colon screening, declines colonoscopy at this time\par - Bone density script given\par - Prevnar given today \par - EKG - sinus rhythm 71 bpm, ST depression/ twave inversions, LVH  \par \par Hypertensive urgency/ Abnormal EKG/ Murmur\par - BP very elevated\par - will start Losartan/ HCTZ combo \par - advised to make urgent appt with cardio - Dr. Polanco for tomorrow\par - EKG abnormalities - as above\par - New murmur heard on exam\par - needs ECHO, and likely stress testing\par - follow up here in 2 weeks\par \par Depression screening\par - PHQ score 0\par - negative screening

## 2020-02-14 NOTE — TELEPHONE ENCOUNTER
----- Message from Bita Shukla sent at 2/14/2020 12:18 PM CST -----  Contact: pt      Name of Who is Calling:Wilder Perez (Spouse)    What is the request in detail: Patient would like a call back regarding sleep results Please contact to further discuss and advise    Can the clinic reply by [facility]: No     What Number to Call Back if not in [facility]: [phone]

## 2020-02-19 ENCOUNTER — PATIENT MESSAGE (OUTPATIENT)
Dept: SLEEP MEDICINE | Facility: CLINIC | Age: 60
End: 2020-02-19

## 2020-02-19 ENCOUNTER — PATIENT MESSAGE (OUTPATIENT)
Dept: INTERNAL MEDICINE | Facility: CLINIC | Age: 60
End: 2020-02-19

## 2020-02-19 DIAGNOSIS — I63.9 CEREBROVASCULAR ACCIDENT (CVA), UNSPECIFIED MECHANISM: Primary | ICD-10-CM

## 2020-02-19 NOTE — TELEPHONE ENCOUNTER
Isaac, pt needs to be evaluated in Physical medicine/Rehab for electric wheelchair evaluation.  I placed a referral.  Would you call Mrs Ayala or her daughter, Reina to schedule this appt.  I believe Dr Olsen does these evaluations.  Thanks

## 2020-02-28 ENCOUNTER — OFFICE VISIT (OUTPATIENT)
Dept: HOME HEALTH SERVICES | Facility: CLINIC | Age: 60
End: 2020-02-28
Payer: MEDICARE

## 2020-02-28 ENCOUNTER — TELEPHONE (OUTPATIENT)
Dept: INTERNAL MEDICINE | Facility: CLINIC | Age: 60
End: 2020-02-28

## 2020-02-28 VITALS
BODY MASS INDEX: 35.55 KG/M2 | WEIGHT: 200.63 LBS | HEIGHT: 63 IN | DIASTOLIC BLOOD PRESSURE: 86 MMHG | SYSTOLIC BLOOD PRESSURE: 140 MMHG | HEART RATE: 84 BPM

## 2020-02-28 DIAGNOSIS — M06.9 RHEUMATOID ARTHRITIS INVOLVING MULTIPLE SITES, UNSPECIFIED RHEUMATOID FACTOR PRESENCE: ICD-10-CM

## 2020-02-28 DIAGNOSIS — I70.0 ATHEROSCLEROSIS OF AORTA: ICD-10-CM

## 2020-02-28 DIAGNOSIS — G47.33 OSA (OBSTRUCTIVE SLEEP APNEA): ICD-10-CM

## 2020-02-28 DIAGNOSIS — I69.359 HEMIPLEGIA AS LATE EFFECT OF STROKE: ICD-10-CM

## 2020-02-28 DIAGNOSIS — D63.1 ANEMIA IN ESRD (END-STAGE RENAL DISEASE): ICD-10-CM

## 2020-02-28 DIAGNOSIS — I27.20 PULMONARY HYPERTENSION: ICD-10-CM

## 2020-02-28 DIAGNOSIS — Z99.2 TYPE 2 DIABETES MELLITUS WITH CHRONIC KIDNEY DISEASE ON CHRONIC DIALYSIS, WITH LONG-TERM CURRENT USE OF INSULIN: ICD-10-CM

## 2020-02-28 DIAGNOSIS — Z99.3 DEPENDENCE ON WHEELCHAIR: ICD-10-CM

## 2020-02-28 DIAGNOSIS — N18.6 ANEMIA IN ESRD (END-STAGE RENAL DISEASE): ICD-10-CM

## 2020-02-28 DIAGNOSIS — I73.9 PVD (PERIPHERAL VASCULAR DISEASE): ICD-10-CM

## 2020-02-28 DIAGNOSIS — G81.94 LEFT HEMIPARESIS: ICD-10-CM

## 2020-02-28 DIAGNOSIS — N18.6 ESRD (END STAGE RENAL DISEASE) ON DIALYSIS: ICD-10-CM

## 2020-02-28 DIAGNOSIS — E11.22 TYPE 2 DIABETES MELLITUS WITH CHRONIC KIDNEY DISEASE ON CHRONIC DIALYSIS, WITH LONG-TERM CURRENT USE OF INSULIN: ICD-10-CM

## 2020-02-28 DIAGNOSIS — R26.9 ABNORMALITY OF GAIT AND MOBILITY: ICD-10-CM

## 2020-02-28 DIAGNOSIS — Z86.73 HISTORY OF STROKE: ICD-10-CM

## 2020-02-28 DIAGNOSIS — F41.8 MIXED ANXIETY AND DEPRESSIVE DISORDER: ICD-10-CM

## 2020-02-28 DIAGNOSIS — G89.0 CENTRAL PAIN SYNDROME: ICD-10-CM

## 2020-02-28 DIAGNOSIS — N18.6 TYPE 2 DIABETES MELLITUS WITH CHRONIC KIDNEY DISEASE ON CHRONIC DIALYSIS, WITH LONG-TERM CURRENT USE OF INSULIN: ICD-10-CM

## 2020-02-28 DIAGNOSIS — T82.898A PROBLEM WITH DIALYSIS ACCESS, INITIAL ENCOUNTER: ICD-10-CM

## 2020-02-28 DIAGNOSIS — E78.2 MIXED HYPERLIPIDEMIA: ICD-10-CM

## 2020-02-28 DIAGNOSIS — Z00.00 ENCOUNTER FOR PREVENTIVE HEALTH EXAMINATION: Primary | ICD-10-CM

## 2020-02-28 DIAGNOSIS — I65.23 BILATERAL CAROTID ARTERY STENOSIS: ICD-10-CM

## 2020-02-28 DIAGNOSIS — I10 ESSENTIAL HYPERTENSION: ICD-10-CM

## 2020-02-28 DIAGNOSIS — Z99.2 ESRD (END STAGE RENAL DISEASE) ON DIALYSIS: ICD-10-CM

## 2020-02-28 DIAGNOSIS — Z79.4 TYPE 2 DIABETES MELLITUS WITH CHRONIC KIDNEY DISEASE ON CHRONIC DIALYSIS, WITH LONG-TERM CURRENT USE OF INSULIN: ICD-10-CM

## 2020-02-28 DIAGNOSIS — G40.209 PARTIAL SYMPTOMATIC EPILEPSY WITH COMPLEX PARTIAL SEIZURES, NOT INTRACTABLE, WITHOUT STATUS EPILEPTICUS: ICD-10-CM

## 2020-02-28 PROBLEM — R31.9 HEMATURIA: Status: RESOLVED | Noted: 2019-06-21 | Resolved: 2020-02-28

## 2020-02-28 PROBLEM — Z98.61 CORONARY ANGIOPLASTY STATUS: Status: RESOLVED | Noted: 2018-09-20 | Resolved: 2020-02-28

## 2020-02-28 PROCEDURE — G0439 PR MEDICARE ANNUAL WELLNESS SUBSEQUENT VISIT: ICD-10-PCS | Mod: NTX,S$GLB,, | Performed by: NURSE PRACTITIONER

## 2020-02-28 PROCEDURE — 99499 RISK ADDL DX/OHS AUDIT: ICD-10-PCS | Mod: S$GLB,,, | Performed by: NURSE PRACTITIONER

## 2020-02-28 PROCEDURE — 3051F HG A1C>EQUAL 7.0%<8.0%: CPT | Mod: CPTII,NTX,S$GLB, | Performed by: NURSE PRACTITIONER

## 2020-02-28 PROCEDURE — 99499 UNLISTED E&M SERVICE: CPT | Mod: S$GLB,,, | Performed by: NURSE PRACTITIONER

## 2020-02-28 PROCEDURE — G0439 PPPS, SUBSEQ VISIT: HCPCS | Mod: NTX,S$GLB,, | Performed by: NURSE PRACTITIONER

## 2020-02-28 PROCEDURE — 3051F PR MOST RECENT HEMOGLOBIN A1C LEVEL 7.0 - < 8.0%: ICD-10-PCS | Mod: CPTII,NTX,S$GLB, | Performed by: NURSE PRACTITIONER

## 2020-02-28 NOTE — TELEPHONE ENCOUNTER
----- Message from Nannette Carreno sent at 2/28/2020 12:29 PM CST -----  Contact: Patient/[phone]  Requesting Orders    Orders being requested: brigido    Reason for request: problems getting around    Please advise.    Thank You

## 2020-02-28 NOTE — PROGRESS NOTES
"Lucy Perez presented for a  Medicare AWV and comprehensive Health Risk Assessment today. The following components were reviewed and updated:    · Medical history  · Family History  · Social history  · Allergies and Current Medications  · Health Risk Assessment  · Health Maintenance  · Care Team     ** See Completed Assessments for Annual Wellness Visit within the encounter summary.**       The following assessments were completed:  · Living Situation  · CAGE  · Depression Screening  · Timed Get Up and Go  · Whisper Test  · Cognitive Function Screening  ·   ·   ·   · Nutrition Screening  · ADL Screening  · PAQ Screening    Vitals:    02/28/20 1129   BP: (!) 140/86   Pulse: 84   Weight: 91 kg (200 lb 9.9 oz)   Height: 5' 3" (1.6 m)     Body mass index is 35.54 kg/m².  Physical Exam   Constitutional: She is oriented to person, place, and time. She appears well-developed.   HENT:   Head: Normocephalic and atraumatic.   Eyes: EOM are normal.   Neck: Normal range of motion.   Cardiovascular: Normal rate, regular rhythm and normal heart sounds.   Pulmonary/Chest: Effort normal and breath sounds normal. No respiratory distress.   Abdominal: Soft. Bowel sounds are normal. She exhibits no distension.   Musculoskeletal: She exhibits no edema.   Left hemiparesis  Wheelchair bound   Neurological: She is alert and oriented to person, place, and time.   Skin: Skin is warm and dry.   Psychiatric: She has a normal mood and affect. Her behavior is normal.         Diagnoses and health risks identified today and associated recommendations/orders:    1. Encounter for preventive health examination  Assessment completed. Preventive measures reviewed with patient and patients spouse Wilder.    2. Dependence on wheelchair  Stable, followed by PCP.    3. Abnormality of gait and mobility  Stable, followed by PCP.    4. Left hemiparesis  Stable, followed by PCP.    5. Partial symptomatic epilepsy with complex partial seizures, not " intractable, without status epilepticus  Stable, followed by Neurology    6. LEFT Hemiplegia as late effect of stroke  Stable, followed by PCP.    7. History of stroke  Stable, followed by PCP and Cardiology.    8. Central pain syndrome  Stable, followed by PCP.    9. Mixed anxiety and depressive disorder  Stable, followed by Psychiatry.    10. Pulmonary hypertension  Stable, followed by Cardiology.    11. Mixed hyperlipidemia  Stable, followed by PCP.    12. Essential hypertension  Stable, followed by PCP.    13. Atherosclerosis of aorta  Stable, followed by Cardiology.    14. Problem with dialysis access, subsequent encounter  Stable, followed Vascular Surgery.    15. ESRD (end stage renal disease) on dialysis  Stable, followed by Nephrology.    16. Anemia in ESRD (end-stage renal disease)  Stable, followed by PCP and Nephrology.    17. Type 2 diabetes mellitus with chronic kidney disease on chronic dialysis, with long-term current use of insulin  Stable, followed by PCP and Nephrology.    18. Rheumatoid arthritis involving multiple sites, unspecified rheumatoid factor presence  Stable, followed by Rheumatology.    19. PVD (peripheral vascular disease)  Stable, followed by Cardiology.    20. Bilateral carotid artery stenosis  Stable, followed by Cardiology.     21. THERESA (obstructive sleep apnea)  Stable, followed by Sleep Medicine.    Provided Lucy with a 5-10 year written screening schedule and personal prevention plan. Recommendations were developed using the USPSTF age appropriate recommendations. Education, counseling, and referrals were provided as needed. After Visit Summary printed and given to patient which includes a list of additional screenings\tests needed.    No follow-ups on file.    Sendy Roberts NP  I offered to discuss end of life issues, including information on how to make advance directives that the patient could use to name someone who would make medical decisions on their behalf if they  became too ill to make themselves.    ___Patient declined  _X_Patient is interested, I provided paper work and offered to discuss.

## 2020-02-28 NOTE — TELEPHONE ENCOUNTER
Pt advised that Dr. Max referred her to Physical Medicine for Scooter eval. Pt given the dept phone number.

## 2020-03-03 DIAGNOSIS — I61.9 HEMORRHAGIC CEREBROVASCULAR ACCIDENT (CVA): ICD-10-CM

## 2020-03-03 DIAGNOSIS — E53.8 FOLATE DEFICIENCY: ICD-10-CM

## 2020-03-03 PROCEDURE — 99285 EMERGENCY DEPT VISIT HI MDM: CPT | Mod: 25,HCNC,NTX

## 2020-03-03 PROCEDURE — 82962 GLUCOSE BLOOD TEST: CPT | Mod: HCNC,NTX

## 2020-03-03 PROCEDURE — 36410 VNPNXR 3YR/> PHY/QHP DX/THER: CPT | Mod: HCNC,NTX

## 2020-03-03 PROCEDURE — 99284 PR EMERGENCY DEPT VISIT,LEVEL IV: ICD-10-PCS | Mod: HCNC,NTX,, | Performed by: EMERGENCY MEDICINE

## 2020-03-03 PROCEDURE — 99284 EMERGENCY DEPT VISIT MOD MDM: CPT | Mod: HCNC,NTX,, | Performed by: EMERGENCY MEDICINE

## 2020-03-04 ENCOUNTER — HOSPITAL ENCOUNTER (EMERGENCY)
Facility: HOSPITAL | Age: 60
Discharge: HOME | End: 2020-03-04
Attending: EMERGENCY MEDICINE
Payer: MEDICARE

## 2020-03-04 VITALS
RESPIRATION RATE: 20 BRPM | DIASTOLIC BLOOD PRESSURE: 55 MMHG | TEMPERATURE: 99 F | SYSTOLIC BLOOD PRESSURE: 124 MMHG | OXYGEN SATURATION: 98 % | HEART RATE: 62 BPM

## 2020-03-04 DIAGNOSIS — R51.9 ACUTE NONINTRACTABLE HEADACHE, UNSPECIFIED HEADACHE TYPE: Primary | ICD-10-CM

## 2020-03-04 LAB
ALBUMIN SERPL BCP-MCNC: 3.7 G/DL (ref 3.5–5.2)
ALP SERPL-CCNC: 116 U/L (ref 55–135)
ALT SERPL W/O P-5'-P-CCNC: 9 U/L (ref 10–44)
ANION GAP SERPL CALC-SCNC: 15 MMOL/L (ref 8–16)
AST SERPL-CCNC: 11 U/L (ref 10–40)
BASOPHILS # BLD AUTO: 0.04 K/UL (ref 0–0.2)
BASOPHILS NFR BLD: 0.6 % (ref 0–1.9)
BILIRUB SERPL-MCNC: 0.5 MG/DL (ref 0.1–1)
BUN SERPL-MCNC: 43 MG/DL (ref 8–23)
CALCIUM SERPL-MCNC: 9.7 MG/DL (ref 8.7–10.5)
CHLORIDE SERPL-SCNC: 95 MMOL/L (ref 95–110)
CO2 SERPL-SCNC: 26 MMOL/L (ref 23–29)
CREAT SERPL-MCNC: 6.1 MG/DL (ref 0.5–1.4)
DIFFERENTIAL METHOD: ABNORMAL
EOSINOPHIL # BLD AUTO: 0.2 K/UL (ref 0–0.5)
EOSINOPHIL NFR BLD: 3 % (ref 0–8)
ERYTHROCYTE [DISTWIDTH] IN BLOOD BY AUTOMATED COUNT: 14.6 % (ref 11.5–14.5)
EST. GFR  (AFRICAN AMERICAN): 7.9 ML/MIN/1.73 M^2
EST. GFR  (NON AFRICAN AMERICAN): 6.8 ML/MIN/1.73 M^2
GLUCOSE SERPL-MCNC: 148 MG/DL (ref 70–110)
HCT VFR BLD AUTO: 37.8 % (ref 37–48.5)
HGB BLD-MCNC: 11.6 G/DL (ref 12–16)
IMM GRANULOCYTES # BLD AUTO: 0.02 K/UL (ref 0–0.04)
IMM GRANULOCYTES NFR BLD AUTO: 0.3 % (ref 0–0.5)
INR PPP: 1 (ref 0.8–1.2)
LYMPHOCYTES # BLD AUTO: 1.3 K/UL (ref 1–4.8)
LYMPHOCYTES NFR BLD: 20.1 % (ref 18–48)
MCH RBC QN AUTO: 30.9 PG (ref 27–31)
MCHC RBC AUTO-ENTMCNC: 30.7 G/DL (ref 32–36)
MCV RBC AUTO: 101 FL (ref 82–98)
MONOCYTES # BLD AUTO: 0.5 K/UL (ref 0.3–1)
MONOCYTES NFR BLD: 8.4 % (ref 4–15)
NEUTROPHILS # BLD AUTO: 4.3 K/UL (ref 1.8–7.7)
NEUTROPHILS NFR BLD: 67.6 % (ref 38–73)
NRBC BLD-RTO: 0 /100 WBC
PLATELET # BLD AUTO: 175 K/UL (ref 150–350)
PMV BLD AUTO: 10.7 FL (ref 9.2–12.9)
POCT GLUCOSE: 146 MG/DL (ref 70–110)
POTASSIUM SERPL-SCNC: 4.5 MMOL/L (ref 3.5–5.1)
PROT SERPL-MCNC: 7.9 G/DL (ref 6–8.4)
PROTHROMBIN TIME: 10.6 SEC (ref 9–12.5)
RBC # BLD AUTO: 3.75 M/UL (ref 4–5.4)
SODIUM SERPL-SCNC: 136 MMOL/L (ref 136–145)
WBC # BLD AUTO: 6.33 K/UL (ref 3.9–12.7)

## 2020-03-04 PROCEDURE — 85025 COMPLETE CBC W/AUTO DIFF WBC: CPT | Mod: HCNC,NTX

## 2020-03-04 PROCEDURE — 93010 ELECTROCARDIOGRAM REPORT: CPT | Mod: HCNC,NTX,, | Performed by: INTERNAL MEDICINE

## 2020-03-04 PROCEDURE — 93010 EKG 12-LEAD: ICD-10-PCS | Mod: HCNC,NTX,, | Performed by: INTERNAL MEDICINE

## 2020-03-04 PROCEDURE — 93005 ELECTROCARDIOGRAM TRACING: CPT | Mod: HCNC,NTX

## 2020-03-04 PROCEDURE — 25000003 PHARM REV CODE 250: Mod: HCNC,NTX | Performed by: STUDENT IN AN ORGANIZED HEALTH CARE EDUCATION/TRAINING PROGRAM

## 2020-03-04 PROCEDURE — 85610 PROTHROMBIN TIME: CPT | Mod: HCNC,NTX

## 2020-03-04 PROCEDURE — 80053 COMPREHEN METABOLIC PANEL: CPT | Mod: HCNC,NTX

## 2020-03-04 RX ORDER — ACETAMINOPHEN 500 MG
1000 TABLET ORAL
Status: COMPLETED | OUTPATIENT
Start: 2020-03-04 | End: 2020-03-04

## 2020-03-04 RX ORDER — FAMOTIDINE 20 MG/1
TABLET, FILM COATED ORAL
Qty: 90 TABLET | Refills: 2 | Status: SHIPPED | OUTPATIENT
Start: 2020-03-04 | End: 2020-03-13 | Stop reason: SDUPTHER

## 2020-03-04 RX ORDER — FOLIC ACID 1 MG/1
TABLET ORAL
Qty: 90 TABLET | Refills: 2 | Status: SHIPPED | OUTPATIENT
Start: 2020-03-04 | End: 2020-03-13 | Stop reason: SDUPTHER

## 2020-03-04 RX ADMIN — ACETAMINOPHEN 1000 MG: 500 TABLET ORAL at 02:03

## 2020-03-04 NOTE — ED NOTES
Charge RN aware of patient's symptoms and Hx. Per charge, keep patient abran 3. Patient has baseline L-sided paralysis from previous stroke and is denying blurry vision or dizziness at this time. Patient speaking clear/fluent. A&ox4.

## 2020-03-04 NOTE — ED NOTES
"Pt states, "horrible headache and it was starting in the front on the right side all the way to the back. It started around 9 tonight." pt reports taking 650 mg tylenol at approximately 2130 tonight. No acute distress noted. Stable condition. Family members at bedside.    "

## 2020-03-04 NOTE — ED PROVIDER NOTES
Encounter Date: 3/3/2020       History     Chief Complaint   Patient presents with    Headache     x3 hrs 8/10. Denies trauma., blurry vision, dizziness. Hx cva 2013 with L sided deficits. Dyalisis T,R,Sa     59F with known Sarcoidosis, THERESA, pulm HTN, Epilepsy, Hemorrhagic CVA (2016) c/b left hemiplagia, IDDM2, Diastolic HF, ESRD on HD TTS is here for acute onset severe generalized headache  Patient reports sharp frontal headache that started around 9 pm and later generalized and is dull now. Patient tried tylenol that did not help with the pain. Patient reports right orbital pain associated with headache and right temporal pain. Patient reports headache is similar to her headache that happened during hemorrhagic stroke in 2016. Patient reports light headedness associated with headache. Patient says BP at home was 140s/80s which was high in 190s SBP on arrival.   Patient denies LOC, seizure, new neurologic deficit, nausea, vomiting, fever, chills, , neck pain/stiffness, rash, sick contacts/recent URTI, vision change, dysphagia, slurred speech, facial weakness, confusion, hx of tension or migraine headaches.         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    Cortical cataract of both eyes 7/26/2016    Decubitus ulcer of buttock, stage 2     Degeneration of lumbar or lumbosacral intervertebral disc 3/5/2013    S/p MRI L-spine 5/2009     Depression     Encounter for blood transfusion      ESRD on hemodialysis     Started August 2018    General anesthetics causing adverse effect in therapeutic use     Hemorrhagic cerebrovascular accident (CVA)     8/2016 [place] [facility] [place] [facility] [place] hemiparesis    History of stroke 6/28/2017    s/p R-MCA stroke with R-putaminal hemorrhagic transformation in 8/2016 and 11/2016 (s/[place]ran[place] [facility] [place] with residual L hemiparesis, on AED s/p CVA      Hypertensive retinopathy of both eyes 7/26/2016    Impingement syndrome of right shoulder 7/2/2014    Obesity     THERESA (obstructive sleep apnea) 3/5/2013    No Home CPAP 2ndary to cost     Partial symptomatic epilepsy with complex partial seizures, not intractable, without status epilepticus     Rheumatoid arthritis(714.0)     Rotator cuff tear 7/2/2014    Sarcoidosis     Stroke 2016    left sided flaccidity, SAH    Vertebral artery stenosis 3/5/2013    S/p Stenting per Dr Burnett      Past Surgical History:   Procedure Laterality Date    BREAST SURGERY      breast reduction    COLONOSCOPY N/A 8/11/2016    Procedure: COLONOSCOPY;  Surgeon: Jerry Vilchis MD;  Location: [facility] ENDO (4TH FLR);  Service: Endoscopy;  Laterality: N/A;  Patient reports difficulty awaking from anesthesia in the past.    DECLOTTING OF VASCULAR GRAFT Right 6/20/2019    Procedure: DECLOT-GRAFT;  Surgeon: Cabrera Irwin MD;  Location: [facility] CATH LAB;  Service: Cardiology;  Laterality: Right;    DECLOTTING OF VASCULAR GRAFT Right 12/6/2019    Procedure: DECLOT-GRAFT;  Surgeon: Cabrera Irwin MD;  Location: [facility] OR 2ND FLR;  Service: Peripheral Vascular;  Laterality: Right;    FISTULOGRAM Right 2/11/2019    Procedure: Fistulogram;  Surgeon: Meir Valencia MD;  Location: [facility] CATH LAB;  Service: Peripheral Vascular;  Laterality: Right;    FISTULOGRAM Right 7/8/2019    Procedure: Fistulogram;  Surgeon: WELLINGOTN Palm III, MD;  Location: [facility] CATH LAB;  Service: Peripheral Vascular;  Laterality: Right;     "FISTULOGRAM Right 12/6/2019    Procedure: Fistulogram;  Surgeon: Cabrera Irwin MD;  Location: [facility] OR [address] [place] [address];  Service: Peripheral Vascular;  Laterality: Right;    HYSTERECTOMY  1999    JOSE LUIS/BSO (AUB)    PLACEMENT OF ARTERIOVENOUS GRAFT Right 10/18/2018    Procedure: AV GRAFT CREATION;  Surgeon: Meir Valencia MD;  Location: [facility] OR [address] [place] [address];  Service: Cardiovascular;  Laterality: Right;    ROTATOR CUFF REPAIR Right July 9, 2014    right side    Skull surgery      Aneurysm    stent placed      in vertebral artery    TOTAL REDUCTION MAMMOPLASTY      TUBAL LIGATION       Family History   Problem Relation Age of Onset    Diabetes Mother     Hypertension Mother     Heart disease Mother     Heart attack Mother     Breast cancer Mother     Stroke Sister     Hypertension Sister     Sleep apnea Sister     No Known Problems Daughter     Diabetes Son     Bell's palsy Sister     Lupus Sister     Blindness Maternal Grandmother     Diabetes Unknown         "My entire family family has diabetes"    Stroke Maternal Aunt     Colon cancer Neg Hx     Ovarian cancer Neg Hx      Social History     Tobacco Use    Smoking status: Never Smoker    Smokeless tobacco: Never Used   Substance Use Topics    Alcohol use: Yes     Comment: occasional wine cooler     Drug use: Never     Review of Systems   Constitutional: Negative for activity change, appetite change, chills, fatigue and fever.   Eyes: Negative for visual disturbance.   Respiratory: Negative for cough and shortness of breath.    Cardiovascular: Negative for chest pain, palpitations and leg swelling.   Gastrointestinal: Negative for abdominal distention, abdominal pain, blood in stool, constipation, diarrhea, nausea and vomiting.   Genitourinary: Negative for difficulty urinating, dysuria, flank pain and hematuria.   Musculoskeletal: Negative for arthralgias, back pain and joint swelling.   Neurological: Positive for light-headedness and " headaches. Negative for dizziness, seizures, syncope, facial asymmetry, speech difficulty, weakness and numbness.   Psychiatric/Behavioral: Negative for confusion.       Physical Exam     Initial Vitals [03/03/20 2351]   BP Pulse Resp Temp SpO2   (!) 190/76 70 16 98.5 °F (36.9 °C) (!) 94 %      MAP       --         Physical Exam    Constitutional: She appears well-developed and well-nourished. She is not diaphoretic. No distress.   HENT:   Head: Normocephalic and atraumatic.   Mouth/Throat: Oropharynx is clear and moist. No oropharyngeal exudate.   Eyes: Conjunctivae and EOM are normal. Pupils are equal, round, and reactive to light. No scleral icterus.   Neck: Normal range of motion. Neck supple. No thyromegaly present. No neck rigidity. No JVD present.   Cardiovascular: Normal rate, regular rhythm, normal heart sounds and intact distal pulses.   Pulmonary/Chest: Breath sounds normal. No respiratory distress. She has no rales.   Abdominal: Soft. Normal appearance and bowel sounds are normal. She exhibits no distension. There is no tenderness.   Neurological: She is alert and oriented to person, place, and time. No cranial nerve deficit or sensory deficit.   Normal strength on RUE and RLE  Hemiplegic on LUE and LLE     Skin: Skin is warm. No rash noted.         ED Course   Procedures  Labs Reviewed   CBC W/ AUTO DIFFERENTIAL   COMPREHENSIVE METABOLIC PANEL   PROTIME-INR   POCT GLUCOSE MONITORING CONTINUOUS     EKG Readings: (Independently Interpreted)   NSR  No ischemic changes  No conduction abnormalities  Rate 63       Imaging Results    None          Medical Decision Making:   Initial Assessment:   59F with known CVA in 2016 c/b left hemiplegia, ESRD on HD and IDDM2 is here for acute generalized headache  Differential Diagnosis:   ICH vs Head injury vs Migraine vs Tension headache vs Meningitis vs Hypertension vs Hypoglycemia vs Hyperglycemia vs Seizure  ED Management:  Patient has a hx of hemorrhagic stroke is  here with headache similar to her headache from the brain bleed the last time is concerning for ICH - Will order a CTH without contrast to rule out ICH.   Will get labs like CBC, CMP and PT-INR (patient reporting dialysis clotting today)  Timeline and pattern of symptoms and physical examination makes suspicion for meningitis or migraine or tension headache very low.  Temporal tenderness on examination raises concerns for Temporal arteritis.  Patient reports having a intracranial aneurysm that was diagnosed during CVA in 2016 however no aneurysm on prior imaging on our review     Will administer one dose 1000mg tylenol for headache at this point    Pending lab and imaging results    CTH negative for any ICH   Patient's BP more controlled now and headache has resolved with one dose 1000 mg tylenol  Will discharge patient back home with likely diagnosis of hypertensive urgency vs tension headache   Patient instructed appropriately on further precautions and signs to return to the ER (new neurologic deficit, worsening headache)    Care discussed with Dr Jo  Patient to follow up with PCP in 2 weeks                                 Clinical Impression:       ICD-10-CM ICD-9-CM   1. Acute nonintractable headache, unspecified headache type R51 784.0         Disposition:   Disposition: Discharged  Condition: Stable                        Uche Vogt MD  Resident  03/04/20 0214       Uche Vogt MD  Resident  03/04/20 0257

## 2020-03-04 NOTE — DISCHARGE INSTRUCTIONS
Return to the ER if you notice new weakness/sensory changes of your extremities  Return to the ER or go to Urgent care if headache does not improve in 4-5 days or return to the ER if symptoms are worsening.  Follow up with your PCP in 2 weeks

## 2020-03-05 ENCOUNTER — PATIENT OUTREACH (OUTPATIENT)
Dept: ADMINISTRATIVE | Facility: HOSPITAL | Age: 60
End: 2020-03-05

## 2020-03-06 ENCOUNTER — TELEPHONE (OUTPATIENT)
Dept: SURGERY | Facility: CLINIC | Age: 60
End: 2020-03-06

## 2020-03-06 NOTE — PRE-PROCEDURE INSTRUCTIONS
PREOP INSTRUCTIONS:No solid food ,milk or milk products for 8 hours prior to procedure.Clear liquids are allowed up to 2 hours before procedure.Clear liquids are:water,apple juice,gatorade & powerade.Shower instructions as well as directions to the Surgery Center were given.Patient encouraged to wear loose fitting,comfortable clothing.Attempted to review medications.

## 2020-03-09 ENCOUNTER — HOSPITAL ENCOUNTER (OUTPATIENT)
Facility: HOSPITAL | Age: 60
Discharge: HOME | End: 2020-03-09
Attending: SURGERY | Admitting: SURGERY
Payer: MEDICARE

## 2020-03-09 ENCOUNTER — ANESTHESIA EVENT (OUTPATIENT)
Dept: SURGERY | Facility: HOSPITAL | Age: 60
End: 2020-03-09
Payer: MEDICARE

## 2020-03-09 ENCOUNTER — ANESTHESIA (OUTPATIENT)
Dept: SURGERY | Facility: HOSPITAL | Age: 60
End: 2020-03-09
Payer: MEDICARE

## 2020-03-09 VITALS
OXYGEN SATURATION: 99 % | BODY MASS INDEX: 35.55 KG/M2 | RESPIRATION RATE: 18 BRPM | HEART RATE: 76 BPM | HEIGHT: 63 IN | DIASTOLIC BLOOD PRESSURE: 64 MMHG | TEMPERATURE: 98 F | WEIGHT: 200.63 LBS | SYSTOLIC BLOOD PRESSURE: 144 MMHG

## 2020-03-09 DIAGNOSIS — N18.6 ESRD (END STAGE RENAL DISEASE): Primary | ICD-10-CM

## 2020-03-09 LAB
POCT GLUCOSE: 186 MG/DL (ref 70–110)
POCT GLUCOSE: 270 MG/DL (ref 70–110)

## 2020-03-09 PROCEDURE — 36000708 HC OR TIME LEV III 1ST 15 MIN: Mod: HCNC,TXP | Performed by: SURGERY

## 2020-03-09 PROCEDURE — 82962 GLUCOSE BLOOD TEST: CPT | Mod: HCNC,TXP | Performed by: SURGERY

## 2020-03-09 PROCEDURE — 71000016 HC POSTOP RECOV ADDL HR: Mod: HCNC,TXP | Performed by: SURGERY

## 2020-03-09 PROCEDURE — 25000003 PHARM REV CODE 250: Mod: HCNC,TXP | Performed by: NURSE ANESTHETIST, CERTIFIED REGISTERED

## 2020-03-09 PROCEDURE — 63600175 PHARM REV CODE 636 W HCPCS: Mod: HCNC,NTX | Performed by: STUDENT IN AN ORGANIZED HEALTH CARE EDUCATION/TRAINING PROGRAM

## 2020-03-09 PROCEDURE — 63600175 PHARM REV CODE 636 W HCPCS: Mod: HCNC,TXP | Performed by: SURGERY

## 2020-03-09 PROCEDURE — 71000015 HC POSTOP RECOV 1ST HR: Mod: HCNC,TXP | Performed by: SURGERY

## 2020-03-09 PROCEDURE — 37000008 HC ANESTHESIA 1ST 15 MINUTES: Mod: HCNC,TXP | Performed by: SURGERY

## 2020-03-09 PROCEDURE — 49324 LAP INSERT TUNNEL IP CATH: CPT | Mod: HCNC,NTX,, | Performed by: SURGERY

## 2020-03-09 PROCEDURE — 27201423 OPTIME MED/SURG SUP & DEVICES STERILE SUPPLY: Mod: HCNC,NTX | Performed by: SURGERY

## 2020-03-09 PROCEDURE — 49324 PR LAP INSERTION TUNNELED INTRAPERITONEAL CATHETER: ICD-10-PCS | Mod: HCNC,NTX,, | Performed by: SURGERY

## 2020-03-09 PROCEDURE — C1750 CATH, HEMODIALYSIS,LONG-TERM: HCPCS | Mod: HCNC,NTX | Performed by: SURGERY

## 2020-03-09 PROCEDURE — D9220A PRA ANESTHESIA: Mod: HCNC,CRNA,NTX, | Performed by: NURSE ANESTHETIST, CERTIFIED REGISTERED

## 2020-03-09 PROCEDURE — 25000003 PHARM REV CODE 250: Mod: HCNC,TXP | Performed by: SURGERY

## 2020-03-09 PROCEDURE — D9220A PRA ANESTHESIA: ICD-10-PCS | Mod: HCNC,ANES,NTX, | Performed by: ANESTHESIOLOGY

## 2020-03-09 PROCEDURE — 71000044 HC DOSC ROUTINE RECOVERY FIRST HOUR: Mod: HCNC,TXP | Performed by: SURGERY

## 2020-03-09 PROCEDURE — D9220A PRA ANESTHESIA: Mod: HCNC,ANES,NTX, | Performed by: ANESTHESIOLOGY

## 2020-03-09 PROCEDURE — 63600175 PHARM REV CODE 636 W HCPCS: Mod: HCNC,NTX | Performed by: NURSE ANESTHETIST, CERTIFIED REGISTERED

## 2020-03-09 PROCEDURE — 82962 GLUCOSE BLOOD TEST: CPT | Mod: HCNC,NTX | Performed by: SURGERY

## 2020-03-09 PROCEDURE — D9220A PRA ANESTHESIA: ICD-10-PCS | Mod: HCNC,CRNA,NTX, | Performed by: NURSE ANESTHETIST, CERTIFIED REGISTERED

## 2020-03-09 PROCEDURE — 36000709 HC OR TIME LEV III EA ADD 15 MIN: Mod: HCNC,NTX | Performed by: SURGERY

## 2020-03-09 PROCEDURE — 37000009 HC ANESTHESIA EA ADD 15 MINS: Mod: HCNC,TXP | Performed by: SURGERY

## 2020-03-09 DEVICE — CATH FLEX-NECK ADULT STANDARD: Type: IMPLANTABLE DEVICE | Site: ABDOMEN | Status: FUNCTIONAL

## 2020-03-09 RX ORDER — OXYCODONE AND ACETAMINOPHEN 5; 325 MG/1; MG/1
1 TABLET ORAL EVERY 4 HOURS PRN
Qty: 21 TABLET | Refills: 0 | Status: SHIPPED | OUTPATIENT
Start: 2020-03-09 | End: 2020-06-05 | Stop reason: CLARIF

## 2020-03-09 RX ORDER — HEPARIN SODIUM 1000 [USP'U]/ML
INJECTION, SOLUTION INTRAVENOUS; SUBCUTANEOUS
Status: DISCONTINUED | OUTPATIENT
Start: 2020-03-09 | End: 2020-03-09 | Stop reason: HOSPADM

## 2020-03-09 RX ORDER — HYDROMORPHONE HYDROCHLORIDE 1 MG/ML
0.2 INJECTION, SOLUTION INTRAMUSCULAR; INTRAVENOUS; SUBCUTANEOUS EVERY 5 MIN PRN
Status: DISCONTINUED | OUTPATIENT
Start: 2020-03-09 | End: 2020-03-09 | Stop reason: HOSPADM

## 2020-03-09 RX ORDER — PHENYLEPHRINE HYDROCHLORIDE 10 MG/ML
INJECTION INTRAVENOUS
Status: DISCONTINUED | OUTPATIENT
Start: 2020-03-09 | End: 2020-03-09

## 2020-03-09 RX ORDER — LIDOCAINE HYDROCHLORIDE 10 MG/ML
1 INJECTION, SOLUTION EPIDURAL; INFILTRATION; INTRACAUDAL; PERINEURAL ONCE
Status: DISCONTINUED | OUTPATIENT
Start: 2020-03-09 | End: 2020-03-09 | Stop reason: HOSPADM

## 2020-03-09 RX ORDER — MIDAZOLAM HYDROCHLORIDE 1 MG/ML
INJECTION, SOLUTION INTRAMUSCULAR; INTRAVENOUS
Status: DISCONTINUED | OUTPATIENT
Start: 2020-03-09 | End: 2020-03-09

## 2020-03-09 RX ORDER — MEPERIDINE HYDROCHLORIDE 50 MG/ML
12.5 INJECTION INTRAMUSCULAR; INTRAVENOUS; SUBCUTANEOUS ONCE AS NEEDED
Status: DISCONTINUED | OUTPATIENT
Start: 2020-03-09 | End: 2020-03-09 | Stop reason: HOSPADM

## 2020-03-09 RX ORDER — CEFAZOLIN SODIUM 1 G/3ML
2 INJECTION, POWDER, FOR SOLUTION INTRAMUSCULAR; INTRAVENOUS
Status: COMPLETED | OUTPATIENT
Start: 2020-03-09 | End: 2020-03-09

## 2020-03-09 RX ORDER — DIPHENHYDRAMINE HYDROCHLORIDE 50 MG/ML
25 INJECTION INTRAMUSCULAR; INTRAVENOUS EVERY 6 HOURS PRN
Status: DISCONTINUED | OUTPATIENT
Start: 2020-03-09 | End: 2020-03-09 | Stop reason: HOSPADM

## 2020-03-09 RX ORDER — LIDOCAINE HYDROCHLORIDE 10 MG/ML
INJECTION, SOLUTION INTRAVENOUS
Status: DISCONTINUED | OUTPATIENT
Start: 2020-03-09 | End: 2020-03-09

## 2020-03-09 RX ORDER — BUPIVACAINE HYDROCHLORIDE 2.5 MG/ML
INJECTION, SOLUTION EPIDURAL; INFILTRATION; INTRACAUDAL
Status: DISCONTINUED | OUTPATIENT
Start: 2020-03-09 | End: 2020-03-09 | Stop reason: HOSPADM

## 2020-03-09 RX ORDER — GLYCOPYRROLATE 0.2 MG/ML
INJECTION INTRAMUSCULAR; INTRAVENOUS
Status: DISCONTINUED | OUTPATIENT
Start: 2020-03-09 | End: 2020-03-09

## 2020-03-09 RX ORDER — PROPOFOL 10 MG/ML
VIAL (ML) INTRAVENOUS
Status: DISCONTINUED | OUTPATIENT
Start: 2020-03-09 | End: 2020-03-09

## 2020-03-09 RX ORDER — FENTANYL CITRATE 50 UG/ML
25 INJECTION, SOLUTION INTRAMUSCULAR; INTRAVENOUS EVERY 5 MIN PRN
Status: DISCONTINUED | OUTPATIENT
Start: 2020-03-09 | End: 2020-03-09 | Stop reason: HOSPADM

## 2020-03-09 RX ORDER — SODIUM CHLORIDE 9 MG/ML
INJECTION, SOLUTION INTRAVENOUS CONTINUOUS PRN
Status: DISCONTINUED | OUTPATIENT
Start: 2020-03-09 | End: 2020-03-09

## 2020-03-09 RX ORDER — ONDANSETRON 2 MG/ML
INJECTION INTRAMUSCULAR; INTRAVENOUS
Status: DISCONTINUED | OUTPATIENT
Start: 2020-03-09 | End: 2020-03-09

## 2020-03-09 RX ORDER — ROCURONIUM BROMIDE 10 MG/ML
INJECTION, SOLUTION INTRAVENOUS
Status: DISCONTINUED | OUTPATIENT
Start: 2020-03-09 | End: 2020-03-09

## 2020-03-09 RX ORDER — FENTANYL CITRATE 50 UG/ML
INJECTION, SOLUTION INTRAMUSCULAR; INTRAVENOUS
Status: DISCONTINUED | OUTPATIENT
Start: 2020-03-09 | End: 2020-03-09

## 2020-03-09 RX ADMIN — PROPOFOL 100 MG: 10 INJECTION, EMULSION INTRAVENOUS at 07:03

## 2020-03-09 RX ADMIN — GLYCOPYRROLATE 0.2 MG: 0.2 INJECTION, SOLUTION INTRAMUSCULAR; INTRAVENOUS at 08:03

## 2020-03-09 RX ADMIN — ROCURONIUM BROMIDE 30 MG: 10 INJECTION, SOLUTION INTRAVENOUS at 07:03

## 2020-03-09 RX ADMIN — ONDANSETRON 4 MG: 2 INJECTION INTRAMUSCULAR; INTRAVENOUS at 09:03

## 2020-03-09 RX ADMIN — PHENYLEPHRINE HYDROCHLORIDE 200 MCG: 10 INJECTION INTRAVENOUS at 07:03

## 2020-03-09 RX ADMIN — FENTANYL CITRATE 50 MCG: 50 INJECTION, SOLUTION INTRAMUSCULAR; INTRAVENOUS at 08:03

## 2020-03-09 RX ADMIN — FENTANYL CITRATE 50 MCG: 50 INJECTION, SOLUTION INTRAMUSCULAR; INTRAVENOUS at 07:03

## 2020-03-09 RX ADMIN — MIDAZOLAM HYDROCHLORIDE 2 MG: 1 INJECTION, SOLUTION INTRAMUSCULAR; INTRAVENOUS at 07:03

## 2020-03-09 RX ADMIN — SODIUM CHLORIDE: 0.9 INJECTION, SOLUTION INTRAVENOUS at 07:03

## 2020-03-09 RX ADMIN — PROPOFOL 20 MG: 10 INJECTION, EMULSION INTRAVENOUS at 08:03

## 2020-03-09 RX ADMIN — PHENYLEPHRINE HYDROCHLORIDE 100 MCG: 10 INJECTION INTRAVENOUS at 08:03

## 2020-03-09 RX ADMIN — LIDOCAINE HYDROCHLORIDE 50 MG: 10 INJECTION, SOLUTION INTRAVENOUS at 07:03

## 2020-03-09 RX ADMIN — SUGAMMADEX 400 MG: 100 INJECTION, SOLUTION INTRAVENOUS at 09:03

## 2020-03-09 RX ADMIN — CEFAZOLIN 2 G: 330 INJECTION, POWDER, FOR SOLUTION INTRAMUSCULAR; INTRAVENOUS at 07:03

## 2020-03-09 RX ADMIN — PHENYLEPHRINE HYDROCHLORIDE 200 MCG: 10 INJECTION INTRAVENOUS at 09:03

## 2020-03-09 NOTE — INTERVAL H&P NOTE
The patient has been examined and the H&P has been reviewed:    I concur with the findings and no changes have occurred since H&P was written. To OR today for laparoscopic peritoneal dialysis catheter. Consent obtained.    Anesthesia/Surgery risks, benefits and alternative options discussed and understood by patient/family.          There are no hospital problems to display for this patient.

## 2020-03-09 NOTE — ANESTHESIA POSTPROCEDURE EVALUATION
Anesthesia Post Evaluation    Patient: Lucy Perez    Procedure(s) Performed: Procedure(s) (LRB):  INSERTION, CATHETER, DIALYSIS, PERITONEAL, LAPAROSCOPIC (N/A)  LYSIS, ADHESIONS, LAPAROSCOPIC (N/A)    Final Anesthesia Type: general    Patient location during evaluation: PACU  Patient participation: Yes- Able to Participate  Level of consciousness: awake  Post-procedure vital signs: reviewed and stable  Pain management: adequate  Airway patency: patent    PONV status at discharge: No PONV  Anesthetic complications: no    Kyra-operative Events Comments:         20g IV cannula removed from RUE av graft, pressure held with sterile gauze.  comfortable with the pressure holding as he has done it many times in past.            Cardiovascular status: blood pressure returned to baseline  Respiratory status: unassisted  Hydration status: euvolemic  Follow-up not needed.          Vitals Value Taken Time   /64 3/9/2020 11:17 AM   Temp 36.6 °C (97.9 °F) 3/9/2020 11:15 AM   Pulse 76 3/9/2020 11:17 AM   Resp 18 3/9/2020 11:15 AM   SpO2 94 % 3/9/2020 11:17 AM   Vitals shown include unvalidated device data.      No case tracking events are documented in the log.      Pain/Samia Score: Samia Score: 10 (3/9/2020 11:15 AM)

## 2020-03-09 NOTE — PLAN OF CARE
Discharge instructions reviewed with patient and daughter at bedside. Verbalized understanding. Packet given. Pharmacy called for medication to be delivered to patient bedside. Yang RODAS called to remove IV placed at access site.

## 2020-03-09 NOTE — OP NOTE
DATE OF PROCEDURE: 3/9/2020    PRE OP DIAGNOSIS: Problem with dialysis access, initial encounter [T82.898A]    POST OP DIAGNOSIS: Problem with dialysis access, initial encounter [T82.898A]    PROCEDURE: Procedure(s) (LRB):  INSERTION, CATHETER, DIALYSIS, PERITONEAL, LAPAROSCOPIC (N/A)  LYSIS, ADHESIONS, LAPAROSCOPIC (N/A)    Surgeon(s) and Role:     * Paige Monsalve MD - Primary     * Mariah Brown MD - Resident - Assisting    ANESTHESIA: General    INDICATION FOR PROCEDURE: Patient is a 59 year-old woman with end-stage renal disease on HD who is recommended to start peritoneal dialysis. After discussing the risks, benefits and alternatives to laparoscopic PD catheter placement, she elected to proceed.    FINDINGS:   1. Significant adhesions from omentum and small bowel to anterior abdominal wall and pelvis, taken down.  2. Possible small bowel serosal tear oversewn with Lembert Vicryl suture.  3. Intrapelvic placement of PD catheter in midline.  4. Prior gastrostomy adhesions present, left in place.     DESCRIPTION OF PROCEDURE: The patient was met in the pre-op area and her identity and consent confirmed. She was then brought to the Operating Room and placed in the supine position. General anesthesia was induced and she was intubated without incident. SCDs and a warming blanket were placed and 2g cefazolin were infused. Her abdomen was prepped and draped in sterile fashion using Ioband and a pre-procedural pause was performed by all members of the surgical and anesthesia teams. A 5 mm incision was made in the LUQ and the 5 mm Optiview trocar was used to enter into the peritoneum under direct vision. Pneumoperitoneum to 15 mm with CO2 gas was obtained. The abdomen was inspected to make sure no trauma occurred upon entry. Many omental and small bowel adhesions were noted to the anterior abdominal wall and pelvis from her prior surgeries. Two RUQ 5-mm ports were placed under visualization and these were  carefully taken down. A loop of small bowel was densely adherent to pelvic adhesions and after sharp dissection there was a small area concerning for a possible serosal tear; this was oversewn with a 3-0 Vicryl in Lembert fashion. There were also multiple interloop adhesions noted that were not divided. We were able to take all adhesions from the anterior abdominal wall down allowing excellent access to her midpelvis. The template was then used to identify where to make our incisions. A 10 mm incision was made just to the left of the umbilicus and dissection carried down to the anterior fascia. A lateral counter incision was made in the skin for the PD catheter exit site. The dilator introducer catheter was placed through the anterior fascia in the medial wound site and tunneled in the preperitoneal space for roughly 5 cm and then entered into the peritoneum. This was then serially dilated. The catheter was then placed through this on a wire in the correct orientation and into the pelvis making sure to keep the catheter from twisting upon entry. This was placed in until the first cuff was seated in the anterior rectus fascia. After this the wire was removed. The tunneler was used to tunnel the catheter to the exit site until the second cuff was well seated and the catheter was fully extended.  We then attached the extension tubing. 300 mL of sterile saline was easily infused into the peritoneal cavity and there was good flow through the catheter. This was followed by heparin and then the catheter was clamped. Harman's layer was closed with 3-0 Vicryl at the initial dissection site and all skin incisions were closed with 4-0 Monocryl and reinforced with Dermabond. The catheter was covered with large fluff dressing. The patient was awoken from anesthesia and extubated without complication. She was brought to the PACU in good condition having tolerated the operation well. Surgical sponge and needle counts were correct  at the end of the case.     EBL: 5 mL  Specimens: None  Complications: None apparent  Drains: PD catheter  Dispo: Home from PACU     I was present and scrubbed for the entirety of this operation.     Paige Monsalve  3/9/2020

## 2020-03-09 NOTE — TRANSFER OF CARE
"Anesthesia Transfer of Care Note    Patient: Lucy Perez    Procedure(s) Performed: Procedure(s) (LRB):  INSERTION, CATHETER, DIALYSIS, PERITONEAL, LAPAROSCOPIC (N/A)  LYSIS, ADHESIONS, LAPAROSCOPIC (N/A)    Patient location: [facility]    Anesthesia Type: general    Transport from OR: Transported from OR on 6-10 L/min O2 by face mask with adequate spontaneous ventilation    Post pain: adequate analgesia    Post assessment: no apparent anesthetic complications    Post vital signs: stable    Level of consciousness: awake    Nausea/Vomiting: no nausea/vomiting    Complications: none    Transfer of care protocol was followed      Last vitals:   Visit Vitals  BP (!) 148/64 (BP Location: Right leg, Patient Position: Lying)   Pulse 71   Temp 36.6 °C (97.9 °F) (Oral)   Resp 20   Ht 5' 3" (1.6 m)   Wt 91 kg (200 lb 9.9 oz)   LMP  (LMP Unknown)   SpO2 100%   Breastfeeding? No   BMI 35.54 kg/m²     "

## 2020-03-09 NOTE — BRIEF OP NOTE
[facility]  Brief Operative Note    Surgery Date: 3/9/2020     Surgeon(s) and Role:     * Paige Monsalve MD - Primary     * Mariah Brown MD - Resident - Assisting    Pre-op Diagnosis:  Problem with dialysis access, initial encounter [T82.898A]    Post-op Diagnosis:  Post-Op Diagnosis Codes:     * Problem with dialysis access, initial encounter [T82.898A]    Procedure(s) (LRB):  INSERTION, CATHETER, DIALYSIS, PERITONEAL, LAPAROSCOPIC (N/A)  LYSIS, ADHESIONS, LAPAROSCOPIC (N/A)    Anesthesia: General    Description of the findings of the procedure(s): Laparoscopic peritoneal dialysis catheter placement with extensive lysis of adhesions. Oversewed of small serosal tear in small bowel.    Estimated Blood Loss: 5mL         Specimens:   Specimen (12h ago, onward)    None            Discharge Note    OUTCOME: Patient tolerated treatment/procedure well without complication and is now ready for discharge.    DISPOSITION: Home or Self Care    FINAL DIAGNOSIS:  ESRD (end stage renal disease)    FOLLOWUP: In clinic    DISCHARGE INSTRUCTIONS:    Discharge Procedure Orders   Diet diabetic     Sponge bath only until clinic visit     Lifting restrictions   Order Comments: Do not lift anything heavier than 10lbs for six weeks.     No driving until:   Order Comments: Do not drive while taking pain medications.     Leave dressing on - Keep it clean, dry, and intact until clinic visit   Order Comments: Leave dressing intact, PD nurses will take dressing down and replace.     Activity as tolerated     Mariah Bronw MD  Pager: [phone]  General Surgery PGY-III  [facility]

## 2020-03-09 NOTE — ANESTHESIA PREPROCEDURE EVALUATION
03/09/2020  Lucy Perez is a 59 y.o., female.    Anesthesia Evaluation         Review of Systems  Anesthesia Hx:  No problems with previous Anesthesia Hx of Anesthetic complications         Slow to wake up               Social:  Non-Smoker    Cardiovascular:   Exercise tolerance: good Denies Hypertension.  Denies CAD.     Denies Angina. CHF  Functional Capacity Can you climb two flights of stairs? ==> Yes    Pulmonary:   Asthma Denies Recent URI. Sleep Apnea    Renal/:   Chronic Renal Disease, ESRD, Dialysis    Hepatic/GI:   Denies PUD. Denies Hiatal Hernia. GERD Denies Liver Disease.  Denies Hepatitis.    Neurological:   CVA Seizures    Endocrine:   Diabetes Denies Hypothyroidism.        Physical Exam  General:  Well nourished    Airway/Jaw/Neck:  Airway Findings: Mouth Opening: Small, but > 3cm Tongue: Normal  General Airway Assessment: Adult  Mallampati: III  Improves to II with phonation.  TM Distance: Normal, at least 6 cm  Jaw/Neck Findings:  Neck ROM: Normal ROM  Neck Findings:     Eyes/Ears/Nose:  EYES/EARS/NOSE FINDINGS: Normal   Dental:  Dental Findings: In tact   Chest/Lungs:  Chest/Lungs Findings: Clear to auscultation     Heart/Vascular:  Heart Findings: Rate: Normal  Rhythm: Regular Rhythm  Sounds: Normal        Mental Status:  Mental Status Findings:  Alert and Oriented         Anesthesia Plan  Type of Anesthesia, risks & benefits discussed:  Anesthesia Type:  general  Patient's Preference: Proceed with anesthesia understanding that the risks are very small but could be serious or life threatening.  Intra-op Monitoring Plan: standard ASA monitors  Intra-op Monitoring Plan Comments:   Post Op Pain Control Plan:   Post Op Pain Control Plan Comments:   Induction:   IV  Beta Blocker:  Patient is not currently on a Beta-Blocker (No further documentation required).       Informed  Consent: Patient understands risks and agrees with Anesthesia plan.  Questions answered. Anesthesia consent signed with patient.  ASA Score: 4     Day of Surgery Review of History & Physical: I have interviewed and examined the patient. I have reviewed the patient's H&P dated:            Ready For Surgery From Anesthesia Perspective.

## 2020-03-09 NOTE — ANESTHESIA PROCEDURE NOTES
Intubation  Performed by: Opal Lange CRNA  Authorized by: Nabil Iniguez MD     Intubation:     Induction:  Intravenous    Mask Ventilation:  Easy mask    Attempts:  1    Attempted By:  CRNA    Method of Intubation:  Direct    Blade:  Sorto 2    Laryngeal View Grade: Grade I - full view of chords      Difficult Airway Encountered?: No      Complications:  None    Airway Device Size:  7.5    Style/Cuff Inflation:  Cuffed (inflated to minimal occlusive pressure)    Inflation Amount (mL):  5    Tube secured:  22    Secured at:  The teeth    Placement Verified By:  Capnometry    Complicating Factors:  Obesity    Findings Post-Intubation:  BS equal bilateral

## 2020-03-09 NOTE — DISCHARGE INSTRUCTIONS
Peritoneal Dialysis (PD)  Peritoneal dialysis (PD) is a way to cleanse the blood to treat kidney failure. It uses the lining of your abdomen called the peritoneal membrane and a special solution (dialysate). The solution needs to be changed several times a day. This can be done as part of your home or work routine. Or it can be done at night by a machine. Dialysate is put inside your belly (abdomen) through a plastic tube (catheter). This catheter is surgically placed into your abdomen. It takes the catheter site 2 to 4 weeks to heal before you can use it. Your doctor may tell you to have this catheter put in place before your kidneys fail completely.   How PD works  The abdomen is filled with dialysate through the catheter and allowed to remain (dwell) for 4 to 6 hours. The membrane and dialysate then work to clean the blood. The dialysate needs to be changed every few hours. This is called an exchange.    Your experience  · You can do PD at home, work, or where ever is convenient.  · A nurse or technician will teach you how to do PD exchanges and care for the PD tube.  · You have an increased risk of infection in your abdomen (peritonitis). It is very important to carefully follow all instructions to prevent infection.   · You will need to weigh yourself every day to make sure you don't have too much fluid in your body.  · You will need to follow a special diet.   There are 2 ways to do exchanges:  · Continuous ambulatory peritoneal dialysis (CAPD). With this, you do your own exchanges 3 to 5 times per day , each with a dwell time of about 4 to 6 hours (you can have a longer dwell time overnight). Each exchange takes about 30 to 40 minutes each.  · Cyclic peritoneal dialysis (CCPD). This uses a machine called a cycler. The cycler does most of your exchanges at night while you sleep. You have a long dwell time during the day. You may also do manual daytime exchanges, which take 30 to 40 minutes each.  When to  call your healthcare provider   Contact your healthcare provider right away if you have any of the following:  · Fever of 100.4°F (38°C) or higher  · Chills  · Dialysate that is cloudy or bloody when it drains from your body  · Pain in your abdomen or around your catheter  · Skin around your catheter is warm, red, or has drainage  · Blocked flow into or out of your catheter  · Part of your belly appears to be bulging out (hernia)   Date Last Reviewed: 12/1/2016  © 1335-5962 [employer]. [address] [place] [address]. All rights reserved. This information is not intended as a substitute for professional medical care. Always follow your healthcare professional's instructions.

## 2020-03-11 ENCOUNTER — TELEPHONE (OUTPATIENT)
Dept: VASCULAR SURGERY | Facility: CLINIC | Age: 60
End: 2020-03-11

## 2020-03-11 ENCOUNTER — DOCUMENTATION ONLY (OUTPATIENT)
Dept: VASCULAR SURGERY | Facility: CLINIC | Age: 60
End: 2020-03-11

## 2020-03-11 ENCOUNTER — HOSPITAL ENCOUNTER (OUTPATIENT)
Dept: VASCULAR SURGERY | Facility: CLINIC | Age: 60
Discharge: HOME | End: 2020-03-11
Attending: SURGERY
Payer: MEDICARE

## 2020-03-11 ENCOUNTER — TELEPHONE (OUTPATIENT)
Dept: INTERNAL MEDICINE | Facility: CLINIC | Age: 60
End: 2020-03-11

## 2020-03-11 ENCOUNTER — OFFICE VISIT (OUTPATIENT)
Dept: VASCULAR SURGERY | Facility: CLINIC | Age: 60
End: 2020-03-11
Payer: MEDICARE

## 2020-03-11 VITALS
SYSTOLIC BLOOD PRESSURE: 128 MMHG | WEIGHT: 205 LBS | TEMPERATURE: 98 F | HEIGHT: 63 IN | DIASTOLIC BLOOD PRESSURE: 65 MMHG | BODY MASS INDEX: 36.32 KG/M2 | HEART RATE: 67 BPM

## 2020-03-11 DIAGNOSIS — N18.6 ESRD (END STAGE RENAL DISEASE) ON DIALYSIS: Primary | ICD-10-CM

## 2020-03-11 DIAGNOSIS — I69.159: Primary | ICD-10-CM

## 2020-03-11 DIAGNOSIS — Z99.2 ESRD (END STAGE RENAL DISEASE) ON DIALYSIS: Primary | ICD-10-CM

## 2020-03-11 DIAGNOSIS — T82.898D PROBLEM WITH DIALYSIS ACCESS, SUBSEQUENT ENCOUNTER: Primary | ICD-10-CM

## 2020-03-11 DIAGNOSIS — N18.6 ESRD (END STAGE RENAL DISEASE) ON DIALYSIS: ICD-10-CM

## 2020-03-11 DIAGNOSIS — Z99.2 ESRD (END STAGE RENAL DISEASE) ON DIALYSIS: ICD-10-CM

## 2020-03-11 PROCEDURE — 99499 RISK ADDL DX/OHS AUDIT: ICD-10-PCS | Mod: HCNC,S$GLB,, | Performed by: SURGERY

## 2020-03-11 PROCEDURE — 99214 PR OFFICE/OUTPT VISIT, EST, LEVL IV, 30-39 MIN: ICD-10-PCS | Mod: HCNC,NTX,S$GLB, | Performed by: SURGERY

## 2020-03-11 PROCEDURE — 3008F BODY MASS INDEX DOCD: CPT | Mod: HCNC,CPTII,NTX,S$GLB | Performed by: SURGERY

## 2020-03-11 PROCEDURE — 99999 PR PBB SHADOW E&M-EST. PATIENT-LVL IV: CPT | Mod: PBBFAC,HCNC,TXP, | Performed by: SURGERY

## 2020-03-11 PROCEDURE — 93990 DOPPLER FLOW TESTING: CPT | Mod: HCNC,S$GLB,TXP, | Performed by: SURGERY

## 2020-03-11 PROCEDURE — 93990 PR DUPLEX HEMODIALYSIS ACCESS: ICD-10-PCS | Mod: HCNC,S$GLB,TXP, | Performed by: SURGERY

## 2020-03-11 PROCEDURE — 99999 PR PBB SHADOW E&M-EST. PATIENT-LVL IV: ICD-10-PCS | Mod: PBBFAC,HCNC,TXP, | Performed by: SURGERY

## 2020-03-11 PROCEDURE — 99499 UNLISTED E&M SERVICE: CPT | Mod: HCNC,S$GLB,, | Performed by: SURGERY

## 2020-03-11 PROCEDURE — 99214 OFFICE O/P EST MOD 30 MIN: CPT | Mod: HCNC,NTX,S$GLB, | Performed by: SURGERY

## 2020-03-11 PROCEDURE — 3008F PR BODY MASS INDEX (BMI) DOCUMENTED: ICD-10-PCS | Mod: HCNC,CPTII,NTX,S$GLB | Performed by: SURGERY

## 2020-03-11 NOTE — H&P (VIEW-ONLY)
Lucy Aubrey Perez  03/11/2020    HPI:   Patient is a 59 y.o. female with a history of HTN, HLD, L hemiplegia after large stroke in 2016, DM II, stage V CKD since August 2018 MWF for f/u - has some clots w HD  Will start PD in couple of weeks    She has a residual impairment on her left upper extremity and left lower extremity from her stroke and is currently wheel chair bound. She has complete function of her right arm currently. She is right handed. No history of trauma to right arm.   Was seeing Dr. Blank for nephology but he no longer works at [employer].     S/p  12/6/19 (Dr Irwin)  Percutaneous thrombolysis and mechanical thrombectomy w Possis Angiojet AVX   4) #4 over-the-wire Carmelina thrombectomy of the arterial inflow of AV graft.        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     No MI, but history of stroke in 2016 (hemorragic)   Tobacco use: never use    Renal is Dr LING Perez    Past Medical History:   Diagnosis Date    Anemia of chronic disease 6/10/2017    Anxiety     Arthritis of right acromioclavicular joint 7/2/2014    Asthma     Bipolar disorder     Bronchitis, acute     Cataract     Cholelithiasis     Chronic diastolic CHF (congestive heart failure)     Cognitive deficits  following nontraumatic intracerebral hemorrhage 10/22/2016    Cortical cataract of both eyes 7/26/2016    Decubitus ulcer of buttock, stage 2     Degeneration of lumbar or lumbosacral intervertebral disc 3/5/2013    S/p MRI L-spine 5/2009     Depression     Encounter for blood transfusion     ESRD on hemodialysis     Started August 2018    General anesthetics causing adverse effect in therapeutic use     Hemorrhagic cerebrovascular accident (CVA)     8/2016 s/p Hemicraniotomy at [facility] with Left hemiparesis    History of stroke 6/28/2017    s/p R-MCA stroke with R-putaminal hemorrhagic transformation in 8/2016 and 11/2016 (s/p hemicraniotomy at [facility]) with residual L hemiparesis, on AED s/p CVA      Hypertensive retinopathy of both eyes 7/26/2016    Impingement syndrome of right shoulder 7/2/2014    Obesity     THERESA (obstructive sleep apnea) 3/5/2013    No Home CPAP 2ndary to cost     Partial symptomatic epilepsy with complex partial seizures, not intractable, without status epilepticus     Rheumatoid arthritis(714.0)     Rotator cuff tear 7/2/2014    Sarcoidosis     Stroke 2016    left sided flaccidity, SAH    Vertebral artery stenosis 3/5/2013    S/p Stenting per Dr Burnett      Past Surgical History:   Procedure Laterality Date    BREAST SURGERY      breast reduction    COLONOSCOPY N/A 8/11/2016    Procedure: COLONOSCOPY;  Surgeon: Jerry Vilchis MD;  Location: [facility] ENDO (4TH FLR);  Service: Endoscopy;  Laterality: N/A;  Patient reports difficulty awaking from anesthesia in the past.    DECLOTTING OF VASCULAR GRAFT Right 6/20/2019    Procedure: DECLOT-GRAFT;  Surgeon: Cabrera Irwin MD;  Location: [facility] CATH LAB;  Service: Cardiology;  Laterality: Right;    DECLOTTING OF VASCULAR GRAFT Right 12/6/2019    Procedure: DECLOT-GRAFT;  Surgeon: Cabrera Irwin MD;  Location: [facility] OR 2ND FLR;  Service: Peripheral Vascular;  Laterality: Right;    FISTULOGRAM Right 2/11/2019    Procedure:  "Fistulogram;  Surgeon: Meir Valencia MD;  Location: [facility] CATH LAB;  Service: Peripheral Vascular;  Laterality: Right;    FISTULOGRAM Right 7/8/2019    Procedure: Fistulogram;  Surgeon: WELLINGTON Palm III, MD;  Location: [facility] CATH LAB;  Service: Peripheral Vascular;  Laterality: Right;    FISTULOGRAM Right 12/6/2019    Procedure: Fistulogram;  Surgeon: Cabrera Irwin MD;  [facility];  Service: Peripheral Vascular;  Laterality: Right;    HYSTERECTOMY  1999    JOSE LUIS/BSO (AUB)    LAPAROSCOPIC LYSIS OF ADHESIONS N/A 3/9/2020    Procedure: LYSIS, ADHESIONS, LAPAROSCOPIC;  Surgeon: Paige Monsalve MD;  [facility];  Service: General;  Laterality: N/A;    PLACEMENT OF ARTERIOVENOUS GRAFT Right 10/18/2018    Procedure: AV GRAFT CREATION;  Surgeon: Meir Valencia MD;  [facility];  Service: Cardiovascular;  Laterality: Right;    ROTATOR CUFF REPAIR Right July 9, 2014    right side    Skull surgery      Aneurysm    stent placed      in vertebral artery    TOTAL REDUCTION MAMMOPLASTY      TUBAL LIGATION       Family History   Problem Relation Age of Onset    Diabetes Mother     Hypertension Mother     Heart disease Mother     Heart attack Mother     Breast cancer Mother     Stroke Sister     Hypertension Sister     Sleep apnea Sister     No Known Problems Daughter     Diabetes Son     Bell's palsy Sister     Lupus Sister     Blindness Maternal Grandmother     Diabetes Unknown         "My entire family family has diabetes"    Stroke Maternal Aunt     Colon cancer Neg Hx     Ovarian cancer Neg Hx      Social History     Socioeconomic History    Marital status:      Spouse name: Not on file    Number of children: Not on file    Years of education: Not on file    Highest education level: Not on file   Occupational History    Not on file   Social Needs    Financial resource strain: Not on file    Food insecurity:     Worry: Not on " "file     Inability: Not on file    Transportation needs:     Medical: Not on file     Non-medical: Not on file   Tobacco Use    Smoking status: Never Smoker    Smokeless tobacco: Never Used   Substance and Sexual Activity    Alcohol use: Yes     Comment: occasional wine cooler     Drug use: Never    Sexual activity: Yes     Partners: Male     Birth control/protection: Post-menopausal   Lifestyle    Physical activity:     Days per week: Not on file     Minutes per session: Not on file    Stress: Not on file   Relationships    Social connections:     Talks on phone: Not on file     Gets together: Not on file     Attends Mu-ism service: Not on file     Active member of club or organization: Not on file     Attends meetings of clubs or organizations: Not on file     Relationship status: Not on file   Other Topics Concern    Not on file   Social History Narrative    . 2 children(Wilder and Reina). Does not work. Previously worked as a .      Current Outpatient Medications on File Prior to Visit   Medication Sig    acetaminophen (TYLENOL) 325 MG tablet Take 2 tablets (650 mg total) by mouth every 6 (six) hours as needed for Pain.    albuterol (VENTOLIN HFA) 90 mcg/actuation inhaler Inhale 2 puffs into the lungs every 6 (six) hours as needed for Wheezing. Rescue    aspirin (ECOTRIN) 81 MG EC tablet Take 81 mg by mouth every morning.     atorvastatin (LIPITOR) 40 MG tablet TAKE 1 TABLET ONE TIME DAILY FOR CHOLESTEROL    BD INSULIN PEN NEEDLE UF SHORT 31 gauge x 5/16" Ndle USE TO INJECT NOVOLOG FLEXPEN BEFORE MEALS    bisacodyl (DULCOLAX) 10 mg Supp Place 1 suppository (10 mg total) rectally daily as needed.    blood sugar diagnostic Strp To check BG 4  times daily, to use with insurance preferred meter-true metrix    blood-glucose meter kit To check BG 4 times daily, to use with insurance preferred meter-true metrix    ergocalciferol (ERGOCALCIFEROL) 50,000 unit Cap Take 1 " "capsule (50,000 Units total) by mouth every 7 days.    famotidine (PEPCID) 20 MG tablet TAKE 1 TABLET BY MOUTH ONCE DAILY    FLUoxetine 10 MG capsule Take 1 capsule (10 mg total) by mouth once daily.    folic acid (FOLVITE) 1 MG tablet TAKE 1 TABLET BY MOUTH ONCE DAILY    glipiZIDE (GLUCOTROL) 2.5 MG TR24 Take 1 tablet (hold if less than 150) at lunch (non dialysis days)    insulin (LANTUS SOLOSTAR U-100 INSULIN) glargine 100 units/mL (3mL) SubQ pen Inject 14 units at night, cut back to 12 units if sugar is < 120.    insulin aspart U-100 (NOVOLOG U-100 INSULIN ASPART) 100 unit/mL injection Use correction scale 180-230+1, 231-280+2, 281-330+3, 331-380+4, >380+5, max 15 units.    levETIRAcetam (KEPPRA) 1000 MG tablet Take 1 tablet (1,000 mg total) by mouth once daily. (Patient taking differently: Take 1,000 mg by mouth once daily. On non dialysis days)    levETIRAcetam (KEPPRA) 500 MG Tab Take 1 tablet (500 mg total) by mouth every Mon, Wed, Fri. Monday Wednesday and Friday after DIALYSIS take additional 500mg (Patient taking differently: Take 500 mg by mouth 2 (two) times daily. Twice daily on dialysis days)    lidocaine (LIDODERM) 5 % Place 1 patch onto the skin once daily. Remove & Discard patch within 12 hours or as directed by MD    LORazepam (ATIVAN) 0.5 MG tablet 1 tab 1 hour prior to Dialysis    ondansetron (ZOFRAN-ODT) 4 MG TbDL Take 1 tablet (4 mg total) by mouth every 8 (eight) hours as needed.    oxyCODONE-acetaminophen (PERCOCET) 5-325 mg per tablet Take 1 tablet by mouth every 4 (four) hours as needed for Pain (Do not drive while taking pain medications).    pen needle, diabetic (BD EMI 2ND GEN PEN NEEDLE) 32 gauge x 5/32" Ndle Uses once a day, 90 day    polyethylene glycol (MIRALAX) 17 gram/dose powder Take 17 g by mouth 2 (two) times daily.    pregabalin (LYRICA) 25 MG capsule Take 1 capsule (25 mg total) by mouth once daily. May take additional dose after HD.    sevelamer carbonate " (RENVELA) 800 mg Tab Take 800 mg by mouth 3 (three) times daily with meals.    TRUEPLUS LANCETS 33 gauge Misc     clopidogrel (PLAVIX) 75 mg tablet Take 1 tablet (75 mg total) by mouth once daily.     No current facility-administered medications on file prior to visit.        REVIEW OF SYSTEMS:  General: negative; ENT: negative; Allergy and Immunology: negative; Hematological and Lymphatic: Negative; Endocrine: negative; Respiratory: no cough, shortness of breath, or wheezing; Cardiovascular: no chest pain or dyspnea on exertion; Gastrointestinal: no abdominal pain/back, change in bowel habits, or bloody stools; Genito-Urinary: no dysuria, trouble voiding, or hematuria; Musculoskeletal: negative  Neurological: no TIA or stroke symptoms    PHYSICAL EXAM:   Left Arm BP - Sittin/65 (20 1442)   Vitals:    20 1442   BP: 128/65   Pulse: 67   Temp: 98.3 °F (36.8 °C)       General appearance:  Alert, well-appearing, and in no distress.  Oriented to person, place, and time   Neurological: Normal speech, no focal findings noted; CN II - XII grossly intact           Musculoskeletal: Digits/nail without cyanosis/clubbing.  Normal muscle strength/tone.                 Neck: Supple, no significant adenopathy; thyroid is not enlarged                  No carotid bruit can be auscultated                Chest:  Clear to auscultation, no wheezes, rales or rhonchi, symmetric air entry right permacath     No use of accessory muscles             Cardiac: Normal rate and regular rhythm, S1 and S2 normal; PMI non-displaced          Abdomen: Soft, nontender, nondistended, no masses or organomegaly     No rebound tenderness noted; bowel sounds normal     Pulsatile aortic mass is no palpable.     No groin adenopathy      Extremities:   RUE VAG with soft thrill - no pulsatility     Non-palpable R radial pulse (pre-op 2+ R radial pulse)     Positive Davidson's test        R hand - motor/sensitive intact    LAB RESULTS:  Lab  Results   Component Value Date    K 4.5 03/04/2020    K 4.8 01/20/2020    K 4.7 01/19/2020    CREATININE 6.1 (H) 03/04/2020    CREATININE 9.0 (H) 01/20/2020    CREATININE 7.7 (H) 01/19/2020     Lab Results   Component Value Date    WBC 6.33 03/04/2020    WBC 5.74 01/20/2020    WBC 6.01 01/19/2020    HCT 37.8 03/04/2020    HCT 31.7 (L) 01/20/2020    HCT 32.5 (L) 01/19/2020     03/04/2020     01/20/2020     01/19/2020     Lab Results   Component Value Date    HGBA1C 7.8 (H) 12/04/2019    HGBA1C 6.8 (H) 08/29/2019    HGBA1C 7.9 (H) 07/11/2019     IMAGING:  Previous:  AVG u/s: flow vol 578 ml/min (prior 1630 ml/min)  Stenoses proximally and in mid-graft (PSVs 466 cm/s; 544 cm/s)  Patent outflow stenoses    Prior: No stenosis --- *PSVs 503 cm/s in mid-graft  + outflow stenosis w  cm/s    Vein mapping:  Right- cephalic medial forearm 4.1, basilic antecubital 6.3  Left- cpehalic vein 2.0, antecubital fossa 3.6    IMP/PLAN:  59 y.o. 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

## 2020-03-11 NOTE — TELEPHONE ENCOUNTER
Pt's  instructed per d/c instructions;leave dressing on - Keep it clean, dry, and intact until clinic visit with Dr Tinsley.Leave dressing intact, PD nurses will take dressing down and replace it, verbalized understanding of information received.  ----- Message from Milagros Melgoza sent at 3/11/2020  8:41 AM CDT -----  Contact: pt [phone]  Wanting to know if she can have her bandage removed at her apt this evening.

## 2020-03-11 NOTE — PROGRESS NOTES
Lucy Aubrey Perez  03/11/2020    HPI:   Patient is a 59 y.o. female with a history of HTN, HLD, L hemiplegia after large stroke in 2016, DM II, stage V CKD since August 2018 MWF for f/u - has some clots w HD  Will start PD in couple of weeks    She has a residual impairment on her left upper extremity and left lower extremity from her stroke and is currently wheel chair bound. She has complete function of her right arm currently. She is right handed. No history of trauma to right arm.   Was seeing Dr. Blank for nephology but he no longer works at [employer].     S/p  12/6/19 (Dr Irwin)  Percutaneous thrombolysis and mechanical thrombectomy w [place]is Angiojet AVX   4) #4 over-the-wire Carmelina thrombectomy of the arterial inflow of AV graft.        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     No MI, but history of stroke in 2016 (hemorragic)   Tobacco use: never use    Renal is Dr LING Perez    Past Medical History:   Diagnosis Date    Anemia of chronic disease 6/10/2017    Anxiety     Arthritis of right acromioclavicular joint 7/2/2014    Asthma     Bipolar disorder     Bronchitis, acute     Cataract     Cholelithiasis     Chronic diastolic CHF (congestive heart failure)     Cognitive deficits  following nontraumatic intracerebral hemorrhage 10/22/2016    Cortical cataract of both eyes 7/26/2016    Decubitus ulcer of buttock, stage 2     Degeneration of lumbar or lumbosacral intervertebral disc 3/5/2013    S/p MRI L-spine 5/2009     Depression     Encounter for blood transfusion     ESRD on hemodialysis     Started August 2018    General anesthetics causing adverse effect in therapeutic use     Hemorrhagic cerebrovascular accident (CVA)     8/2016 s/p Hemicraniotomy at [facility] with Left hemiparesis    History of stroke 6/28/2017    s/p R-MCA stroke with R-putaminal hemorrhagic transformation in 8/2016 and 11/2016 (s/p hemicraniotomy at [facility]) with residual L hemiparesis, on AED s/p CVA      Hypertensive retinopathy of both eyes 7/26/2016    Impingement syndrome of right shoulder 7/2/2014    Obesity     THERESA (obstructive sleep apnea) 3/5/2013    No Home CPAP 2ndary to cost     Partial symptomatic epilepsy with complex partial seizures, not intractable, without status epilepticus     Rheumatoid arthritis(714.0)     Rotator cuff tear 7/2/2014    Sarcoidosis     Stroke 2016    left sided flaccidity, SAH    Vertebral artery stenosis 3/5/2013    S/p Stenting per Dr Burnett      Past Surgical History:   Procedure Laterality Date    BREAST SURGERY      breast reduction    COLONOSCOPY N/A 8/11/2016    Procedure: COLONOSCOPY;  Surgeon: Jerry Vilchis MD;  Location: [facility] ENDO (4TH FLR);  Service: Endoscopy;  Laterality: N/A;  Patient reports difficulty awaking from anesthesia in the past.    DECLOTTING OF VASCULAR GRAFT Right 6/20/2019    Procedure: DECLOT-GRAFT;  Surgeon: Cabrera Irwin MD;  Location: [facility] CATH LAB;  Service: Cardiology;  Laterality: Right;    DECLOTTING OF VASCULAR GRAFT Right 12/6/2019    Procedure: DECLOT-GRAFT;  Surgeon: Cabrera Irwin MD;  Location: [facility] OR 2ND FLR;  Service: Peripheral Vascular;  Laterality: Right;    FISTULOGRAM Right 2/11/2019    Procedure:  "Fistulogram;  Surgeon: Meir Valencia MD;  Location: [facility] CATH LAB;  Service: Peripheral Vascular;  Laterality: Right;    FISTULOGRAM Right 7/8/2019    Procedure: Fistulogram;  Surgeon: WELLINGTON Palm III, MD;  Location: [facility] CATH LAB;  Service: Peripheral Vascular;  Laterality: Right;    FISTULOGRAM Right 12/6/2019    Procedure: Fistulogram;  Surgeon: Cabrera Irwin MD;  [facility];  Service: Peripheral Vascular;  Laterality: Right;    HYSTERECTOMY  1999    JOSE LUIS/BSO (AUB)    LAPAROSCOPIC LYSIS OF ADHESIONS N/A 3/9/2020    Procedure: LYSIS, ADHESIONS, LAPAROSCOPIC;  Surgeon: Paige Monsalve MD;  [facility];  Service: General;  Laterality: N/A;    PLACEMENT OF ARTERIOVENOUS GRAFT Right 10/18/2018    Procedure: AV GRAFT CREATION;  Surgeon: Meir Valencia MD;  [facility];  Service: Cardiovascular;  Laterality: Right;    ROTATOR CUFF REPAIR Right July 9, 2014    right side    Skull surgery      Aneurysm    stent placed      in vertebral artery    TOTAL REDUCTION MAMMOPLASTY      TUBAL LIGATION       Family History   Problem Relation Age of Onset    Diabetes Mother     Hypertension Mother     Heart disease Mother     Heart attack Mother     Breast cancer Mother     Stroke Sister     Hypertension Sister     Sleep apnea Sister     No Known Problems Daughter     Diabetes Son     Bell's palsy Sister     Lupus Sister     Blindness Maternal Grandmother     Diabetes Unknown         "My entire family family has diabetes"    Stroke Maternal Aunt     Colon cancer Neg Hx     Ovarian cancer Neg Hx      Social History     Socioeconomic History    Marital status:      Spouse name: Not on file    Number of children: Not on file    Years of education: Not on file    Highest education level: Not on file   Occupational History    Not on file   Social Needs    Financial resource strain: Not on file    Food insecurity:     Worry: Not on " "file     Inability: Not on file    Transportation needs:     Medical: Not on file     Non-medical: Not on file   Tobacco Use    Smoking status: Never Smoker    Smokeless tobacco: Never Used   Substance and Sexual Activity    Alcohol use: Yes     Comment: occasional wine cooler     Drug use: Never    Sexual activity: Yes     Partners: Male     Birth control/protection: Post-menopausal   Lifestyle    Physical activity:     Days per week: Not on file     Minutes per session: Not on file    Stress: Not on file   Relationships    Social connections:     Talks on phone: Not on file     Gets together: Not on file     Attends Sikhism service: Not on file     Active member of club or organization: Not on file     Attends meetings of clubs or organizations: Not on file     Relationship status: Not on file   Other Topics Concern    Not on file   Social History Narrative    . 2 children(Wilder and Reina). Does not work. Previously worked as a .      Current Outpatient Medications on File Prior to Visit   Medication Sig    acetaminophen (TYLENOL) 325 MG tablet Take 2 tablets (650 mg total) by mouth every 6 (six) hours as needed for Pain.    albuterol (VENTOLIN HFA) 90 mcg/actuation inhaler Inhale 2 puffs into the lungs every 6 (six) hours as needed for Wheezing. Rescue    aspirin (ECOTRIN) 81 MG EC tablet Take 81 mg by mouth every morning.     atorvastatin (LIPITOR) 40 MG tablet TAKE 1 TABLET ONE TIME DAILY FOR CHOLESTEROL    BD INSULIN PEN NEEDLE UF SHORT 31 gauge x 5/16" Ndle USE TO INJECT NOVOLOG FLEXPEN BEFORE MEALS    bisacodyl (DULCOLAX) 10 mg Supp Place 1 suppository (10 mg total) rectally daily as needed.    blood sugar diagnostic Strp To check BG 4  times daily, to use with insurance preferred meter-true metrix    blood-glucose meter kit To check BG 4 times daily, to use with insurance preferred meter-true metrix    ergocalciferol (ERGOCALCIFEROL) 50,000 unit Cap Take 1 " "capsule (50,000 Units total) by mouth every 7 days.    famotidine (PEPCID) 20 MG tablet TAKE 1 TABLET BY MOUTH ONCE DAILY    FLUoxetine 10 MG capsule Take 1 capsule (10 mg total) by mouth once daily.    folic acid (FOLVITE) 1 MG tablet TAKE 1 TABLET BY MOUTH ONCE DAILY    glipiZIDE (GLUCOTROL) 2.5 MG TR24 Take 1 tablet (hold if less than 150) at lunch (non dialysis days)    insulin (LANTUS SOLOSTAR U-100 INSULIN) glargine 100 units/mL (3mL) SubQ pen Inject 14 units at night, cut back to 12 units if sugar is < 120.    insulin aspart U-100 (NOVOLOG U-100 INSULIN ASPART) 100 unit/mL injection Use correction scale 180-230+1, 231-280+2, 281-330+3, 331-380+4, >380+5, max 15 units.    levETIRAcetam (KEPPRA) 1000 MG tablet Take 1 tablet (1,000 mg total) by mouth once daily. (Patient taking differently: Take 1,000 mg by mouth once daily. On non dialysis days)    levETIRAcetam (KEPPRA) 500 MG Tab Take 1 tablet (500 mg total) by mouth every Mon, Wed, Fri. Monday Wednesday and Friday after DIALYSIS take additional 500mg (Patient taking differently: Take 500 mg by mouth 2 (two) times daily. Twice daily on dialysis days)    lidocaine (LIDODERM) 5 % Place 1 patch onto the skin once daily. Remove & Discard patch within 12 hours or as directed by MD    LORazepam (ATIVAN) 0.5 MG tablet 1 tab 1 hour prior to Dialysis    ondansetron (ZOFRAN-ODT) 4 MG TbDL Take 1 tablet (4 mg total) by mouth every 8 (eight) hours as needed.    oxyCODONE-acetaminophen (PERCOCET) 5-325 mg per tablet Take 1 tablet by mouth every 4 (four) hours as needed for Pain (Do not drive while taking pain medications).    pen needle, diabetic (BD EMI 2ND GEN PEN NEEDLE) 32 gauge x 5/32" Ndle Uses once a day, 90 day    polyethylene glycol (MIRALAX) 17 gram/dose powder Take 17 g by mouth 2 (two) times daily.    pregabalin (LYRICA) 25 MG capsule Take 1 capsule (25 mg total) by mouth once daily. May take additional dose after HD.    sevelamer carbonate " (RENVELA) 800 mg Tab Take 800 mg by mouth 3 (three) times daily with meals.    TRUEPLUS LANCETS 33 gauge Misc     clopidogrel (PLAVIX) 75 mg tablet Take 1 tablet (75 mg total) by mouth once daily.     No current facility-administered medications on file prior to visit.        REVIEW OF SYSTEMS:  General: negative; ENT: negative; Allergy and Immunology: negative; Hematological and Lymphatic: Negative; Endocrine: negative; Respiratory: no cough, shortness of breath, or wheezing; Cardiovascular: no chest pain or dyspnea on exertion; Gastrointestinal: no abdominal pain/back, change in bowel habits, or bloody stools; Genito-Urinary: no dysuria, trouble voiding, or hematuria; Musculoskeletal: negative  Neurological: no TIA or stroke symptoms    PHYSICAL EXAM:   Left Arm BP - Sittin/65 (20 1442)   Vitals:    20 1442   BP: 128/65   Pulse: 67   Temp: 98.3 °F (36.8 °C)       General appearance:  Alert, well-appearing, and in no distress.  Oriented to person, place, and time   Neurological: Normal speech, no focal findings noted; CN II - XII grossly intact           Musculoskeletal: Digits/nail without cyanosis/clubbing.  Normal muscle strength/tone.                 Neck: Supple, no significant adenopathy; thyroid is not enlarged                  No carotid bruit can be auscultated                Chest:  Clear to auscultation, no wheezes, rales or rhonchi, symmetric air entry right permacath     No use of accessory muscles             Cardiac: Normal rate and regular rhythm, S1 and S2 normal; PMI non-displaced          Abdomen: Soft, nontender, nondistended, no masses or organomegaly     No rebound tenderness noted; bowel sounds normal     Pulsatile aortic mass is no palpable.     No groin adenopathy      Extremities:   RUE VAG with soft thrill - no pulsatility     Non-palpable R radial pulse (pre-op 2+ R radial pulse)     Positive Davidson's test        R hand - motor/sensitive intact    LAB RESULTS:  Lab  Results   Component Value Date    K 4.5 03/04/2020    K 4.8 01/20/2020    K 4.7 01/19/2020    CREATININE 6.1 (H) 03/04/2020    CREATININE 9.0 (H) 01/20/2020    CREATININE 7.7 (H) 01/19/2020     Lab Results   Component Value Date    WBC 6.33 03/04/2020    WBC 5.74 01/20/2020    WBC 6.01 01/19/2020    HCT 37.8 03/04/2020    HCT 31.7 (L) 01/20/2020    HCT 32.5 (L) 01/19/2020     03/04/2020     01/20/2020     01/19/2020     Lab Results   Component Value Date    HGBA1C 7.8 (H) 12/04/2019    HGBA1C 6.8 (H) 08/29/2019    HGBA1C 7.9 (H) 07/11/2019     IMAGING:  Previous:  AVG u/s: flow vol 578 ml/min (prior 1630 ml/min)  Stenoses proximally and in mid-graft (PSVs 466 cm/s; 544 cm/s)  Patent outflow stenoses    Prior: No stenosis --- *PSVs 503 cm/s in mid-graft  + outflow stenosis w  cm/s    Vein mapping:  Right- cephalic medial forearm 4.1, basilic antecubital 6.3  Left- cpehalic vein 2.0, antecubital fossa 3.6    IMP/PLAN:  59 y.o. 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

## 2020-03-11 NOTE — TELEPHONE ENCOUNTER
----- Message from Cayla Gomez sent at 3/11/2020  2:16 PM CDT -----  Contact: self   Pt states orders need to be put in for her rehab. [facility]  Please call and advise.

## 2020-03-11 NOTE — TELEPHONE ENCOUNTER
"Isaac, I'm not sure what "rehab" pt is referring to.  I do see her next week and can place the referral then after I see her?  "

## 2020-03-12 ENCOUNTER — HOSPITAL ENCOUNTER (OUTPATIENT)
Facility: HOSPITAL | Age: 60
Discharge: HOME | End: 2020-03-12
Attending: SURGERY | Admitting: SURGERY
Payer: MEDICARE

## 2020-03-12 VITALS
TEMPERATURE: 98 F | DIASTOLIC BLOOD PRESSURE: 68 MMHG | SYSTOLIC BLOOD PRESSURE: 148 MMHG | BODY MASS INDEX: 36.32 KG/M2 | HEIGHT: 63 IN | WEIGHT: 205 LBS | HEART RATE: 76 BPM | RESPIRATION RATE: 16 BRPM | OXYGEN SATURATION: 96 %

## 2020-03-12 DIAGNOSIS — T82.898D PROBLEM WITH DIALYSIS ACCESS, SUBSEQUENT ENCOUNTER: ICD-10-CM

## 2020-03-12 DIAGNOSIS — T82.898A PROBLEM WITH DIALYSIS ACCESS, INITIAL ENCOUNTER: Primary | ICD-10-CM

## 2020-03-12 PROCEDURE — 99153 MOD SED SAME PHYS/QHP EA: CPT | Mod: HCNC,NTX | Performed by: SURGERY

## 2020-03-12 PROCEDURE — 36903 INTRO CATH DIALYSIS CIRCUIT: CPT | Mod: HCNC | Performed by: SURGERY

## 2020-03-12 PROCEDURE — 99152 MOD SED SAME PHYS/QHP 5/>YRS: CPT | Mod: HCNC,,, | Performed by: SURGERY

## 2020-03-12 PROCEDURE — 25500020 PHARM REV CODE 255: Mod: HCNC,TXP | Performed by: SURGERY

## 2020-03-12 PROCEDURE — C1894 INTRO/SHEATH, NON-LASER: HCPCS | Mod: HCNC,NTX | Performed by: SURGERY

## 2020-03-12 PROCEDURE — C1887 CATHETER, GUIDING: HCPCS | Mod: HCNC,TXP | Performed by: SURGERY

## 2020-03-12 PROCEDURE — 25000003 PHARM REV CODE 250: Mod: HCNC,NTX | Performed by: SURGERY

## 2020-03-12 PROCEDURE — 63600175 PHARM REV CODE 636 W HCPCS: Mod: HCNC,NTX | Performed by: SURGERY

## 2020-03-12 PROCEDURE — 27201423 OPTIME MED/SURG SUP & DEVICES STERILE SUPPLY: Mod: HCNC,TXP | Performed by: SURGERY

## 2020-03-12 PROCEDURE — 99152 MOD SED SAME PHYS/QHP 5/>YRS: CPT | Mod: HCNC,NTX | Performed by: SURGERY

## 2020-03-12 PROCEDURE — C1725 CATH, TRANSLUMIN NON-LASER: HCPCS | Mod: HCNC,NTX | Performed by: SURGERY

## 2020-03-12 PROCEDURE — 99152 PR MOD CONSCIOUS SEDATION, SAME PHYS, 5+ YRS, FIRST 15 MIN: ICD-10-PCS | Mod: HCNC,,, | Performed by: SURGERY

## 2020-03-12 PROCEDURE — 36903 INTRO CATH DIALYSIS CIRCUIT: CPT | Mod: HCNC,,, | Performed by: SURGERY

## 2020-03-12 PROCEDURE — C1876 STENT, NON-COA/NON-COV W/DEL: HCPCS | Mod: HCNC,TXP | Performed by: SURGERY

## 2020-03-12 PROCEDURE — C1769 GUIDE WIRE: HCPCS | Mod: HCNC,TXP | Performed by: SURGERY

## 2020-03-12 PROCEDURE — 36903 PR INTRO CATH, DIALYSIS CIRCUIT W/TRANSCATH PLCMNT, STENT: ICD-10-PCS | Mod: HCNC,,, | Performed by: SURGERY

## 2020-03-12 DEVICE — LIFESTENT®  BILIARY STENT, 8MM X 60MM (80CM CATHETER LENGTH)
Type: IMPLANTABLE DEVICE | Site: ARM | Status: FUNCTIONAL
Brand: LIFESTENT® BILIARY STENT

## 2020-03-12 RX ORDER — CEFAZOLIN SODIUM 1 G/3ML
INJECTION, POWDER, FOR SOLUTION INTRAMUSCULAR; INTRAVENOUS
Status: DISCONTINUED | OUTPATIENT
Start: 2020-03-12 | End: 2020-03-12 | Stop reason: HOSPADM

## 2020-03-12 RX ORDER — LIDOCAINE HYDROCHLORIDE 10 MG/ML
1 INJECTION, SOLUTION EPIDURAL; INFILTRATION; INTRACAUDAL; PERINEURAL ONCE
Status: DISCONTINUED | OUTPATIENT
Start: 2020-03-12 | End: 2020-06-08 | Stop reason: SDUPTHER

## 2020-03-12 RX ORDER — IODIXANOL 320 MG/ML
INJECTION, SOLUTION INTRAVASCULAR
Status: DISCONTINUED | OUTPATIENT
Start: 2020-03-12 | End: 2020-03-12 | Stop reason: HOSPADM

## 2020-03-12 RX ORDER — HEPARIN SODIUM 1000 [USP'U]/ML
INJECTION, SOLUTION INTRAVENOUS; SUBCUTANEOUS
Status: DISCONTINUED | OUTPATIENT
Start: 2020-03-12 | End: 2020-03-12 | Stop reason: HOSPADM

## 2020-03-12 RX ORDER — HEPARIN SODIUM 200 [USP'U]/100ML
INJECTION, SOLUTION INTRAVENOUS
Status: DISCONTINUED | OUTPATIENT
Start: 2020-03-12 | End: 2020-03-12 | Stop reason: HOSPADM

## 2020-03-12 RX ORDER — LIDOCAINE HYDROCHLORIDE 20 MG/ML
INJECTION, SOLUTION EPIDURAL; INFILTRATION; INTRACAUDAL; PERINEURAL
Status: DISCONTINUED | OUTPATIENT
Start: 2020-03-12 | End: 2020-03-12 | Stop reason: HOSPADM

## 2020-03-12 NOTE — Clinical Note
The sheath is removed from the right radial artery.  POST ANGIOGRAPHY PERFORMED OF THE RIGHT AV FISTULA

## 2020-03-12 NOTE — OP NOTE
Date: 03/12/2020    Surgeon:  Meir Valencia MD - Primary    Assitant: ROCK Butt MD    Pre-op Diagnosis:  Threatended AVF; evidence of stenosis  T82.858A: Stenosis of vascular prosthetic devices, implants and grafts, initial encounter    Post-op Diagnosis:  Same    Procedure(s):  1) U/S-guided R transradial access; RUE AVG fistulagram  2) PTA arterial anastomosis 5x40mm Yash balloon  3) Stent placement (lifeStent 8x60mm,  post-dilated with 7mm balloon) in RUE AVG x2 forr >90% stenoses x2  4) Conscious sedation    Anesthesia: Local MAC    Findings/Key Components:  Successful treatment of proximal stenosis and mid-graft >90% stenoses x2  Good palpable thrill  DAPT, statin rx    EBL: < 10 ml    Anesthesia: IV regional    Findings: Resolution of stenosis; palpable thrill           Complications:  None; patient tolerated the procedure well.           Disposition: Recoery- hemodynamically stable in good condition    Attending Attestation: I was present and scrubbed for the entire procedure.    Procedure detail: The patient was brought to the interventional suite, placed in supine. Arm was prepped and draped in the standard surgical fashion. Under ultrasound guidance, the radial artery was accessed with a micropuncture needle; ultrasound confirmed vessel patency, followed by placement of 4/3-Estonian micropuncture dilator. Through this, an 0.018-inch wire was placed in a 6-Estonian transradial sheath. Through this, an 0.018-inch Glidewire was placed through the high-grade stenosis, which was demonstrated by the angiogram.  The ultrasound demonstrated vessel patency. A high-grade stenosis in proximal and within the RUE AVG x2 were found, which was crossed with a hyrophilic 0.018 in glidewire. This was treated first with  PTA arterial anastomosis 5x40mm Yash balloon; Stent placement (lifeStent 8x60mm,  post-dilated with 7mm balloon) in RUE AVG x2 forr >90% high-pressure, noncompliant balloon. Resolution of the stenosis  was noted. Strong thrill could be felt. The sheath was removed and a trans-radial artery band placed with good hemostasis; hand was well-perfused and thrill palpable.    *MODERATE SEDATION IN CATH LAB   Meir Valencia MD FACS was present for moderate sedation  Dr. Valencia monitored the patients cardio respiratory functions during the moderate sedation   See nurses notes for Intra-service start and end times and for the log of the name of the RN who administered the medicines for the moderate sedation, including their doseage and route.    Time of sedation:  60mins.

## 2020-03-12 NOTE — Clinical Note
Prepped: right radial, right brachial and right chest. Prepped with: ChloraPrep. The patient was draped.

## 2020-03-12 NOTE — BRIEF OP NOTE
Brief Operative Note  Date: 03/12/2020    Surgeon:  Meir Valencia MD - Primary    Assitant: ROCK Butt MD    Pre-op Diagnosis:  Threatended AVF; evidence of stenosis  T82.598A: Stenosis of vascular prosthetic devices, implants and grafts, initial encounter    Post-op Diagnosis:  Same    Procedure(s):  1) U/S-guided R transradial access; RUE AVG fistulagram  2) PTA arterial anastomosis 5x40mm Yash balloon  3) Stent placement (lifeStent 8x60mm,  post-dilated with 7mm balloon) in RUE AVG x2 forr >90% stenoses x2  4) Conscious sedation    Anesthesia: Local MAC    Findings/Key Components:  Successful treatment of proximal stenosis and mid-graft >90% stenoses x2  Good palpable thrill  DAPT, statin rx    EBL: < 10 ml    Anesthesia: IV regional    Findings: Resolution of stenosis; palpable thrill           Complications:  None; patient tolerated the procedure well.           Disposition: Recoery- hemodynamically stable in good condition    Attending Attestation: I was present and scrubbed for the entire procedure.      Specimens (From admission, onward)    None        I attest to being present for the procedure and performing the case.  Meir Valencia MD [facility]  Discharge Note  SUMMARY    Admit Date: 3/12/2020    Attending Physician: Meir Valencia MD [facility]    Discharge Physician: Meir Valencia MD [facility]    Discharge Date: 03/12/2020    Final Diagnosis: Problem with dialysis access, subsequent encounter [T82.898D]    Outcome of Treatment: Successful PTAS    Disposition: Home or self-care    Patient Instructions:   Current Discharge Medication List      CONTINUE these medications which have NOT CHANGED    Details   acetaminophen (TYLENOL) 325 MG tablet Take 2 tablets (650 mg total) by mouth every 6 (six) hours as needed for Pain.  Refills: 0      aspirin (ECOTRIN) 81 MG EC tablet Take 81 mg by mouth every morning.       atorvastatin (LIPITOR) 40 MG tablet TAKE 1 TABLET ONE TIME DAILY FOR CHOLESTEROL  Qty: 90 tablet, Refills: 2  "   Associated Diagnoses: Pure hypercholesterolemia      BD INSULIN PEN NEEDLE UF SHORT 31 gauge x 5/16" Ndle USE TO INJECT NOVOLOG FLEXPEN BEFORE MEALS  Qty: 150 each, Refills: 11      bisacodyl (DULCOLAX) 10 mg Supp Place 1 suppository (10 mg total) rectally daily as needed.  Qty: 30 suppository, Refills: 3      blood sugar diagnostic Strp To check BG 4  times daily, to use with insurance preferred meter-true metrix  Qty: 400 each, Refills: 3      blood-glucose meter kit To check BG 4 times daily, to use with insurance preferred meter-true metrix  Qty: 1 each, Refills: 1      famotidine (PEPCID) 20 MG tablet TAKE 1 TABLET BY MOUTH ONCE DAILY  Qty: 90 tablet, Refills: 2    Associated Diagnoses: Hemorrhagic cerebrovascular accident (CVA)      FLUoxetine 10 MG capsule Take 1 capsule (10 mg total) by mouth once daily.  Qty: 90 capsule, Refills: 0      folic acid (FOLVITE) 1 MG tablet TAKE 1 TABLET BY MOUTH ONCE DAILY  Qty: 90 tablet, Refills: 2    Associated Diagnoses: Folate deficiency      insulin (LANTUS SOLOSTAR U-100 INSULIN) glargine 100 units/mL (3mL) SubQ pen Inject 14 units at night, cut back to 12 units if sugar is < 120.  Qty: 6 mL, Refills: 6      insulin aspart U-100 (NOVOLOG U-100 INSULIN ASPART) 100 unit/mL injection Use correction scale 180-230+1, 231-280+2, 281-330+3, 331-380+4, >380+5, max 15 units.  Qty: 3 vial, Refills: 3      !! levETIRAcetam (KEPPRA) 1000 MG tablet Take 1 tablet (1,000 mg total) by mouth once daily.  Qty: 90 tablet, Refills: 3      !! levETIRAcetam (KEPPRA) 500 MG Tab Take 1 tablet (500 mg total) by mouth every Mon, Wed, Fri. Monday Wednesday and Friday after DIALYSIS take additional 500mg  Qty: 36 tablet, Refills: 3      LORazepam (ATIVAN) 0.5 MG tablet 1 tab 1 hour prior to Dialysis  Qty: 30 tablet, Refills: 0    Associated Diagnoses: Anxiety      oxyCODONE-acetaminophen (PERCOCET) 5-325 mg per tablet Take 1 tablet by mouth every 4 (four) hours as needed for Pain (Do not drive " "while taking pain medications).  Qty: 21 tablet, Refills: 0    Comments: Quantity prescribed more than 7 day supply? No      pen needle, diabetic (BD EMI 2ND GEN PEN NEEDLE) 32 gauge x 5/32" Ndle Uses once a day, 90 day  Qty: 100 each, Refills: 3      polyethylene glycol (MIRALAX) 17 gram/dose powder Take 17 g by mouth 2 (two) times daily.  Qty: 1 Bottle, Refills: 6    Comments: Please dispense a large vol that is covered by insurance,  if possible. Thank you.      sevelamer carbonate (RENVELA) 800 mg Tab Take 800 mg by mouth 3 (three) times daily with meals.      TRUEPLUS LANCETS 33 gauge Misc       albuterol (VENTOLIN HFA) 90 mcg/actuation inhaler Inhale 2 puffs into the lungs every 6 (six) hours as needed for Wheezing. Rescue  Qty: 18 g, Refills: 0    Associated Diagnoses: Chest congestion      clopidogrel (PLAVIX) 75 mg tablet Take 1 tablet (75 mg total) by mouth once daily.  Qty: 120 tablet, Refills: 6      ergocalciferol (ERGOCALCIFEROL) 50,000 unit Cap Take 1 capsule (50,000 Units total) by mouth every 7 days.      glipiZIDE (GLUCOTROL) 2.5 MG TR24 Take 1 tablet (hold if less than 150) at lunch (non dialysis days)  Qty: 30 tablet, Refills: 4      lidocaine (LIDODERM) 5 % Place 1 patch onto the skin once daily. Remove & Discard patch within 12 hours or as directed by MD  Qty: 10 patch, Refills: 1      ondansetron (ZOFRAN-ODT) 4 MG TbDL Take 1 tablet (4 mg total) by mouth every 8 (eight) hours as needed.  Qty: 10 tablet, Refills: 0    Associated Diagnoses: Nausea      pregabalin (LYRICA) 25 MG capsule Take 1 capsule (25 mg total) by mouth once daily. May take additional dose after HD.  Qty: 90 capsule, Refills: 4    Associated Diagnoses: Muscle spasticity       !! - Potential duplicate medications found. Please discuss with provider.          Diet:  Resume pre-operative diet    Activity:  Ad darby    Follow-up:  Follow-up in clinic with Dr Valencia within 3-4 weeks; please call clinic nurse at     "

## 2020-03-12 NOTE — INTERVAL H&P NOTE
The patient has been examined and the H&P has been reviewed:    I concur with the findings and no changes have occurred since H&P was written.    Anesthesia/Surgery risks, benefits and alternative options discussed and understood by patient/family.          Active Hospital Problems    Diagnosis  POA    Problem with dialysis access [T80.850S]  Yes      Resolved Hospital Problems   No resolved problems to display.

## 2020-03-12 NOTE — Clinical Note
Prepped with: ChloraPrep. The site was clipped. The patient was draped. RIGHT ARM PREPPED AND DRAPED

## 2020-03-12 NOTE — NURSING
All air deflated from vasc band on right wrist.  No bleeding noted.  Spouse remains at side.  Will continue to monitor.

## 2020-03-12 NOTE — NURSING
Off unit via pt own wheelchair for discharge home.  Pt spouse at her side.  Pt and pt spouse verbalized understanding of d/c instructions.

## 2020-03-12 NOTE — NURSING
vasc band removed from right wrist.  No bleeding or hematoma noted.  Pt and pt spouse verbalized understanding of d/c instructions.

## 2020-03-12 NOTE — TELEPHONE ENCOUNTER
[facility]  [address] [place] [address]  [phone] Fax

## 2020-03-12 NOTE — TELEPHONE ENCOUNTER
Spoke with MsKimberly Lucy, she wants to start Rehab at [facility] so that she can try to start walking again.

## 2020-03-13 ENCOUNTER — COMMITTEE REVIEW (OUTPATIENT)
Dept: TRANSPLANT | Facility: CLINIC | Age: 60
End: 2020-03-13

## 2020-03-13 ENCOUNTER — LAB VISIT (OUTPATIENT)
Dept: LAB | Facility: HOSPITAL | Age: 60
End: 2020-03-13
Attending: INTERNAL MEDICINE
Payer: MEDICARE

## 2020-03-13 DIAGNOSIS — I10 ESSENTIAL HYPERTENSION: ICD-10-CM

## 2020-03-13 DIAGNOSIS — Z99.2 TYPE 2 DIABETES MELLITUS WITH CHRONIC KIDNEY DISEASE ON CHRONIC DIALYSIS, WITH LONG-TERM CURRENT USE OF INSULIN: ICD-10-CM

## 2020-03-13 DIAGNOSIS — E11.22 TYPE 2 DIABETES MELLITUS WITH CHRONIC KIDNEY DISEASE ON CHRONIC DIALYSIS, WITH LONG-TERM CURRENT USE OF INSULIN: ICD-10-CM

## 2020-03-13 DIAGNOSIS — N18.6 TYPE 2 DIABETES MELLITUS WITH CHRONIC KIDNEY DISEASE ON CHRONIC DIALYSIS, WITH LONG-TERM CURRENT USE OF INSULIN: ICD-10-CM

## 2020-03-13 DIAGNOSIS — E55.9 VITAMIN D DEFICIENCY: ICD-10-CM

## 2020-03-13 DIAGNOSIS — Z79.4 TYPE 2 DIABETES MELLITUS WITH CHRONIC KIDNEY DISEASE ON CHRONIC DIALYSIS, WITH LONG-TERM CURRENT USE OF INSULIN: ICD-10-CM

## 2020-03-13 DIAGNOSIS — E53.8 FOLATE DEFICIENCY: ICD-10-CM

## 2020-03-13 DIAGNOSIS — I61.9 HEMORRHAGIC CEREBROVASCULAR ACCIDENT (CVA): ICD-10-CM

## 2020-03-13 LAB
25(OH)D3+25(OH)D2 SERPL-MCNC: 22 NG/ML (ref 30–96)
ALBUMIN SERPL BCP-MCNC: 3.5 G/DL (ref 3.5–5.2)
ALP SERPL-CCNC: 110 U/L (ref 55–135)
ALT SERPL W/O P-5'-P-CCNC: <5 U/L (ref 10–44)
ANION GAP SERPL CALC-SCNC: 18 MMOL/L (ref 8–16)
AST SERPL-CCNC: 6 U/L (ref 10–40)
BASOPHILS # BLD AUTO: 0.04 K/UL (ref 0–0.2)
BASOPHILS NFR BLD: 0.7 % (ref 0–1.9)
BILIRUB SERPL-MCNC: 0.3 MG/DL (ref 0.1–1)
BUN SERPL-MCNC: 76 MG/DL (ref 8–23)
CALCIUM SERPL-MCNC: 9.2 MG/DL (ref 8.7–10.5)
CHLORIDE SERPL-SCNC: 99 MMOL/L (ref 95–110)
CHOLEST SERPL-MCNC: 134 MG/DL (ref 120–199)
CHOLEST/HDLC SERPL: 2.7 {RATIO} (ref 2–5)
CO2 SERPL-SCNC: 25 MMOL/L (ref 23–29)
CREAT SERPL-MCNC: 10.9 MG/DL (ref 0.5–1.4)
DIFFERENTIAL METHOD: ABNORMAL
EOSINOPHIL # BLD AUTO: 0.2 K/UL (ref 0–0.5)
EOSINOPHIL NFR BLD: 3 % (ref 0–8)
ERYTHROCYTE [DISTWIDTH] IN BLOOD BY AUTOMATED COUNT: 14.1 % (ref 11.5–14.5)
EST. GFR  (AFRICAN AMERICAN): 3.9 ML/MIN/1.73 M^2
EST. GFR  (NON AFRICAN AMERICAN): 3.4 ML/MIN/1.73 M^2
ESTIMATED AVG GLUCOSE: 169 MG/DL (ref 68–131)
GLUCOSE SERPL-MCNC: 161 MG/DL (ref 70–110)
HBA1C MFR BLD HPLC: 7.5 % (ref 4–5.6)
HCT VFR BLD AUTO: 34.7 % (ref 37–48.5)
HDLC SERPL-MCNC: 50 MG/DL (ref 40–75)
HDLC SERPL: 37.3 % (ref 20–50)
HGB BLD-MCNC: 10.2 G/DL (ref 12–16)
IMM GRANULOCYTES # BLD AUTO: 0.03 K/UL (ref 0–0.04)
IMM GRANULOCYTES NFR BLD AUTO: 0.5 % (ref 0–0.5)
LDLC SERPL CALC-MCNC: 62.6 MG/DL (ref 63–159)
LYMPHOCYTES # BLD AUTO: 1.1 K/UL (ref 1–4.8)
LYMPHOCYTES NFR BLD: 18.9 % (ref 18–48)
MCH RBC QN AUTO: 30.6 PG (ref 27–31)
MCHC RBC AUTO-ENTMCNC: 29.4 G/DL (ref 32–36)
MCV RBC AUTO: 104 FL (ref 82–98)
MONOCYTES # BLD AUTO: 0.4 K/UL (ref 0.3–1)
MONOCYTES NFR BLD: 7.4 % (ref 4–15)
NEUTROPHILS # BLD AUTO: 4.2 K/UL (ref 1.8–7.7)
NEUTROPHILS NFR BLD: 69.5 % (ref 38–73)
NONHDLC SERPL-MCNC: 84 MG/DL
NRBC BLD-RTO: 0 /100 WBC
PLATELET # BLD AUTO: 200 K/UL (ref 150–350)
PMV BLD AUTO: 11 FL (ref 9.2–12.9)
POTASSIUM SERPL-SCNC: 5.6 MMOL/L (ref 3.5–5.1)
PROT SERPL-MCNC: 7.5 G/DL (ref 6–8.4)
RBC # BLD AUTO: 3.33 M/UL (ref 4–5.4)
SODIUM SERPL-SCNC: 142 MMOL/L (ref 136–145)
TRIGL SERPL-MCNC: 107 MG/DL (ref 30–150)
TSH SERPL DL<=0.005 MIU/L-ACNC: 0.91 UIU/ML (ref 0.4–4)
WBC # BLD AUTO: 5.97 K/UL (ref 3.9–12.7)

## 2020-03-13 PROCEDURE — 36415 COLL VENOUS BLD VENIPUNCTURE: CPT | Mod: HCNC,PN

## 2020-03-13 PROCEDURE — 80061 LIPID PANEL: CPT | Mod: HCNC

## 2020-03-13 PROCEDURE — 85025 COMPLETE CBC W/AUTO DIFF WBC: CPT | Mod: HCNC

## 2020-03-13 PROCEDURE — 84443 ASSAY THYROID STIM HORMONE: CPT | Mod: HCNC

## 2020-03-13 PROCEDURE — 82306 VITAMIN D 25 HYDROXY: CPT | Mod: HCNC

## 2020-03-13 PROCEDURE — 80053 COMPREHEN METABOLIC PANEL: CPT | Mod: HCNC

## 2020-03-13 PROCEDURE — 83036 HEMOGLOBIN GLYCOSYLATED A1C: CPT | Mod: HCNC

## 2020-03-13 NOTE — COMMITTEE REVIEW
Native Organ Dx: Diabetes Mellitus - Type II      Not approved for LRD/CAD transplant due to poor predictive outcome secondary poor functional capacity and previous cardiac history which makes her high risk.    I spoke with Lucy and then her  at her request. Lucy was very tearful. She was in dialysis so I will call the unit so that she gets support.     Note written by Sheila Ford RN    ===============================================    I was present at the meeting and attest to the decision of the committee.    Darci Ahuja  03/13/2020

## 2020-03-13 NOTE — LETTER
March 13, 2020    Lucy Perez  [address] [address]    Dear Lucy Perez:  MRN: [id number]    It is the duty of the [facility] Selection [facility] to determine which patients are candidates for a transplant. For this reason, our committee has the difficult task of evaluating patients to determine which ones have the greatest chance of having a successful transplant. We are aware of the magnitude of this responsibility, and we approach it with reverence and humility.    It is with regret I inform you that you are not approved as a transplant candidate due to poor functional capacity and cardiac disease which are predictors for poor transplant outcome. Based on this review, we have determined that you are not a candidate for a transplant at [facility].      The selection committee carefully considers each patient's transplant candidacy and determines whether it is safe to proceed with transplantation on a case-by-case basis using established selection criteria.  At present, the risk of proceeding with an elective transplant surgery has become too high.                                                                               Although the selection committee believes you are not a suitable transplant candidate, you have the option to be evaluated at other transplant centers who may have different selection criteria.  You may request your [facility] records be sent to any center of your choice by contacting our Medical Records Department at [phone].                                                                               Attached is a letter from the United Network for Organ Sharing (UNOS).  It describes the services and information offered to patients by UNOS and the Organ Procurement and Transplant Network.    The [facility] sincerely wishes you the best and remains available to answer any questions.  Please do not hesitate to contact our  pre-transplant office if we can assist you in any other way.                                                                               Sincerely,      Ashli Hunt MD  Medical Director, Kidney & Kidney/Pancreas Transplantation  lh/enclosure    Encl: OS Letter    CC:  Dr. Brandi Bower           CNA - [facility]             The Organ Procurement and Transplantation Network   Toll-free patient services line: Your resource for organ transplant information     If you have a question regarding your own medical care, you always should call your transplant hospital first. However, for general organ transplant-related information, you can call the Organ Procurement and Transplantation Network (OPTN) toll-free patient services line at [phone].     Anyone, including potential transplant candidates, candidates, recipients, family members, friends, living donors, and donor family members, can call this number to:     · Talk about organ donation, living donation, the transplant process, the donation process, and transplant policies.   · Get a free patient information kit with helpful booklets, waiting list and transplant information, and a list of all transplant hospitals.   · Ask questions about the OPTN website (https://optn.transplant.hrsa.gov/), the United Network for Organ Sharings (UNOS) website (https://unos.org/), or the UNOS website for living donors and transplant recipients. (https://www.transplantliving.org/).   · Learn how the OPTN can help you.   · Talk about any concerns that you may have with a transplant hospital.     The nations transplant system, the OPTN, is managed under federal contract by the United Network for Organ Sharing (UNOS), which is a non-profit charitable organization. The OPTN helps create and define organ sharing policies that make the best use of donated organs. This process continuously evaluating new advances and discoveries so policies can be adapted to best  serve patients waiting for transplants. To do so, the OPTN works closely with transplant professionals, transplant patients, transplant candidates, donor families, living donors, and the public. All transplant programs and organ procurement organizations throughout the country are OPTN members and are obligated to follow the policies the OPTN creates for allocating organs.     The OPTN also is responsible for:   · Providing educational material for patients, the public, and professionals.   · Raising awareness of the need for donated organs and tissue.   · Coordinating organ procurement, matching, and placement.   · Collecting information about every organ transplant and donation that occurs in the [place].     Remember, you should contact your transplant hospital directly if you have questions or concerns about your own medical care including medical records, work-up progress, and test results.     We are not your transplant hospital, and our staff will not be able to answer questions about your case, so please keep your transplant hospitals phone number handy.   However, while you research your transplant needs and learn as much as you can about transplantation and donation, we welcome your call to our toll-free patient services line at [phone].

## 2020-03-15 RX ORDER — FOLIC ACID 1 MG/1
1000 TABLET ORAL DAILY
Qty: 90 TABLET | Refills: 2 | Status: SHIPPED | OUTPATIENT
Start: 2020-03-15

## 2020-03-15 RX ORDER — FAMOTIDINE 20 MG/1
20 TABLET, FILM COATED ORAL DAILY
Qty: 90 TABLET | Refills: 2 | Status: SHIPPED | OUTPATIENT
Start: 2020-03-15

## 2020-03-22 ENCOUNTER — TELEPHONE (OUTPATIENT)
Dept: INTERNAL MEDICINE | Facility: CLINIC | Age: 60
End: 2020-03-22

## 2020-03-22 DIAGNOSIS — F41.9 ANXIETY: Primary | ICD-10-CM

## 2020-03-22 DIAGNOSIS — E55.9 VITAMIN D DEFICIENCY: ICD-10-CM

## 2020-03-22 RX ORDER — FLUOXETINE HYDROCHLORIDE 20 MG/1
20 CAPSULE ORAL DAILY
Qty: 90 CAPSULE | Refills: 1 | Status: SHIPPED | OUTPATIENT
Start: 2020-03-22 | End: 2020-10-26

## 2020-03-22 RX ORDER — ERGOCALCIFEROL 1.25 MG/1
50000 CAPSULE ORAL
Qty: 12 CAPSULE | Refills: 2 | Status: SHIPPED | OUTPATIENT
Start: 2020-03-22 | End: 2021-05-25

## 2020-03-22 RX ORDER — LORAZEPAM 0.5 MG/1
0.5 TABLET ORAL EVERY 8 HOURS PRN
Qty: 30 TABLET | Refills: 0 | Status: SHIPPED | OUTPATIENT
Start: 2020-03-22 | End: 2020-04-21

## 2020-03-23 ENCOUNTER — TELEPHONE (OUTPATIENT)
Dept: SURGERY | Facility: CLINIC | Age: 60
End: 2020-03-23

## 2020-03-23 NOTE — TELEPHONE ENCOUNTER
Spoke with Mrs Ayala ie a strange spell today with burning in left leg-she actually moved left leg a bit, which is great! I spoke with her and Reina ie her Labs(3/13)showed HgbA1C better at 7.5%(was 7.8%)  Lab otherwise showed K+ high at 5.6 and Vit D low at 22. I will erx in Ergocalciferol refill which she has run out of. She also c/o anxiety with recent COVID issues-had a panic attack this weekend. 'Will increase Fluoxetine to 20mg and refill the Lorazepam .5mg prn.  'Will ask Dr Bower to have K+ rechecked in dialysis.  Damaris/Ivelisse, please call pt's daughter, Reina to schedule pt a RTC in July or August at the latest.  Thanks

## 2020-03-23 NOTE — TELEPHONE ENCOUNTER
----- Message from Paige Monsalve MD sent at 3/23/2020  9:20 AM CDT -----  Regarding: Change appt to TeleHealth  Hi team,    I just got off the phone with Mrs. Perez and her family. Can we please call her daughter (who is currently with her) at [phone] and set her up for a TeleHealth appt tomorrow at 4pm? She has mychart, they just need some guidance on getting set up for the Video appt. Thank you so much!    DAVID   English

## 2020-03-23 NOTE — TELEPHONE ENCOUNTER
Dr Bower   Pt had lab done 3/13 showing K+ high at 5.6, but I know she dialyzes regularly.  Has it been recheck'd since then?  I don't [facility].  Thanks for you help here, Beverly MunizMD

## 2020-03-24 ENCOUNTER — OFFICE VISIT (OUTPATIENT)
Dept: SURGERY | Facility: CLINIC | Age: 60
End: 2020-03-24
Payer: MEDICARE

## 2020-03-24 ENCOUNTER — PATIENT MESSAGE (OUTPATIENT)
Dept: SURGERY | Facility: CLINIC | Age: 60
End: 2020-03-24

## 2020-03-24 DIAGNOSIS — I69.359 HEMIPLEGIA AS LATE EFFECT OF STROKE: ICD-10-CM

## 2020-03-24 DIAGNOSIS — E11.22 TYPE 2 DIABETES MELLITUS WITH CHRONIC KIDNEY DISEASE ON CHRONIC DIALYSIS, WITH LONG-TERM CURRENT USE OF INSULIN: ICD-10-CM

## 2020-03-24 DIAGNOSIS — F41.8 MIXED ANXIETY AND DEPRESSIVE DISORDER: ICD-10-CM

## 2020-03-24 DIAGNOSIS — G47.33 OSA (OBSTRUCTIVE SLEEP APNEA): ICD-10-CM

## 2020-03-24 DIAGNOSIS — N18.6 ESRD (END STAGE RENAL DISEASE): ICD-10-CM

## 2020-03-24 DIAGNOSIS — T85.611A PERITONEAL DIALYSIS CATHETER DYSFUNCTION, INITIAL ENCOUNTER: Primary | ICD-10-CM

## 2020-03-24 DIAGNOSIS — Z79.4 TYPE 2 DIABETES MELLITUS WITH CHRONIC KIDNEY DISEASE ON CHRONIC DIALYSIS, WITH LONG-TERM CURRENT USE OF INSULIN: ICD-10-CM

## 2020-03-24 DIAGNOSIS — I10 ESSENTIAL HYPERTENSION: ICD-10-CM

## 2020-03-24 DIAGNOSIS — E78.2 MIXED HYPERLIPIDEMIA: ICD-10-CM

## 2020-03-24 DIAGNOSIS — D63.1 ANEMIA IN ESRD (END-STAGE RENAL DISEASE): ICD-10-CM

## 2020-03-24 DIAGNOSIS — N18.6 ANEMIA IN ESRD (END-STAGE RENAL DISEASE): ICD-10-CM

## 2020-03-24 DIAGNOSIS — Z99.2 TYPE 2 DIABETES MELLITUS WITH CHRONIC KIDNEY DISEASE ON CHRONIC DIALYSIS, WITH LONG-TERM CURRENT USE OF INSULIN: ICD-10-CM

## 2020-03-24 DIAGNOSIS — N18.6 TYPE 2 DIABETES MELLITUS WITH CHRONIC KIDNEY DISEASE ON CHRONIC DIALYSIS, WITH LONG-TERM CURRENT USE OF INSULIN: ICD-10-CM

## 2020-03-24 PROCEDURE — 99024 PR POST-OP FOLLOW-UP VISIT: ICD-10-PCS | Mod: HCNC,95,, | Performed by: SURGERY

## 2020-03-24 PROCEDURE — 99024 POSTOP FOLLOW-UP VISIT: CPT | Mod: HCNC,95,, | Performed by: SURGERY

## 2020-03-24 NOTE — PROGRESS NOTES
The patient location is: at Home  The chief complaint leading to consultation is: post-op follow-up  Visit type: Virtual visit with synchronous audio and video  Total time spent with patient: 15 minutes  Each patient to whom he or she provides medical services by telemedicine is:  (1) informed of the relationship between the physician and patient and the respective role of any other health care provider with respect to management of the patient; and (2) notified that he or she may decline to receive medical services by telemedicine and may withdraw from such care at any time.    Notes:    HPI:  Mrs. Perez is a 59 year-old woman with ESRD currently on HD but requiring multiple revisions for whom a TeleHealth appt was made for post-op follow-up s/p laparoscopic peritoneal dialysis catheter placement on 3/9/20. Most of this appointment was done with her daughter however the patient was present and answered some questions too. She states that overall she's been doing well post-op and denies fevers, chills, nausea, vomiting, abdominal pain, pelvic pain or pressure, or tenesmus, and her bowels are moving well without constipation. Her incisions are healing well and her surgical site has been cleaned and evaluated by the dialysis staff and is without e/o infection. Today they were able to easily flush fluid however were unable to aspirate. The fluid is clear. Her HD went smoothly today.    PHYSICAL EXAM:  Patient appears in NAD, sitting in wheelchair with surgical mask on, breathing is non-labored.    ASSESSMENT:    The patient is doing well after surgery, however the dialysis team was unable to aspirate from the PD catheter today.     PLAN:    Will ask the dialysis staff to reattempt flushing and aspiration when she goes for HD on Thursday. If still unsuccessful, will consider abdominal x-ray to evaluate catheter position. Continue HD on regular schedule for now. Given COVID, no elective cases are able to be  scheduled, so if dx laparoscopy is requiring, date will be TBD so long as she can successfully hemodialyze. No e/o SSI or peritonitis.  Her daughter will update me on Thursday. All questions were answered to their satisfaction, they expressed understanding and they are in agreement with the plan.     Paige Monsalve  3/24/2020

## 2020-03-26 RX ORDER — LACTULOSE 10 G/15ML
20 SOLUTION ORAL DAILY
Qty: 300 ML | Refills: 0 | Status: SHIPPED | OUTPATIENT
Start: 2020-03-26 | End: 2020-04-05

## 2020-03-26 NOTE — PROGRESS NOTES
Patient with constipation and new start on PD. Was not able to get the fill of 500cc of peritoneal dialysate back. Possibly secondary to constipation, will prescribe Lactulose daily prn constipation.

## 2020-03-30 ENCOUNTER — TELEPHONE (OUTPATIENT)
Dept: VASCULAR SURGERY | Facility: CLINIC | Age: 60
End: 2020-03-30

## 2020-03-31 ENCOUNTER — PATIENT MESSAGE (OUTPATIENT)
Dept: SURGERY | Facility: CLINIC | Age: 60
End: 2020-03-31

## 2020-04-02 ENCOUNTER — PATIENT OUTREACH (OUTPATIENT)
Dept: ADMINISTRATIVE | Facility: OTHER | Age: 60
End: 2020-04-02

## 2020-04-02 NOTE — PROGRESS NOTES
Chart reviewed.   Immunizations: Triggered Imm Registry     Orders placed: n/a  Upcoming appts to satisfy MAIDA topics: n/a

## 2020-04-03 ENCOUNTER — HOSPITAL ENCOUNTER (OUTPATIENT)
Dept: RADIOLOGY | Facility: OTHER | Age: 60
Discharge: HOME | End: 2020-04-03
Attending: SURGERY
Payer: MEDICARE

## 2020-04-03 ENCOUNTER — TELEPHONE (OUTPATIENT)
Dept: INTERNAL MEDICINE | Facility: CLINIC | Age: 60
End: 2020-04-03

## 2020-04-03 DIAGNOSIS — T85.611A PERITONEAL DIALYSIS CATHETER DYSFUNCTION, INITIAL ENCOUNTER: ICD-10-CM

## 2020-04-03 PROCEDURE — 74019 RADEX ABDOMEN 2 VIEWS: CPT | Mod: 26,HCNC,, | Performed by: INTERNAL MEDICINE

## 2020-04-03 PROCEDURE — 74019 RADEX ABDOMEN 2 VIEWS: CPT | Mod: TC,HCNC,FY

## 2020-04-03 PROCEDURE — 74019 XR ABDOMEN FLAT AND ERECT: ICD-10-PCS | Mod: 26,HCNC,, | Performed by: INTERNAL MEDICINE

## 2020-04-03 NOTE — TELEPHONE ENCOUNTER
----- Message from Willow Heck sent at 4/3/2020  9:33 AM CDT -----  Contact: self   Patient want to speak to the doctor but did not give reason for call. Please call and advise.

## 2020-04-03 NOTE — TELEPHONE ENCOUNTER
Spoke with Mrs Ayala and her , Wilder.  I've recommended she wear her face mask and use hand  before and after X-ray, which she needs in order to determine problem with the PD catheter so she can restart dialysis.

## 2020-04-03 NOTE — TELEPHONE ENCOUNTER
Pt is on home dialysis, she needs an xray done because there is no fluid coming from the port for home dialysis. Pt is high risk and wants to know if it is safe for her to come to the clinic for an xray, please advise.

## 2020-04-06 ENCOUNTER — TELEPHONE (OUTPATIENT)
Dept: SURGERY | Facility: CLINIC | Age: 60
End: 2020-04-06

## 2020-04-06 NOTE — TELEPHONE ENCOUNTER
Returned Evonne's phone call in reference to Pt PD cath not working.  She is now having problems with catheter not being able to fill or drain.    Will discuss with Dr. Monsalve.  Pt was prescribed Lactulose by  for possible constipation, but hasn't helped.    Discussed with Dr. Monsalve and she said that the next step would be a dx lap, but pt currently has HD access so we would proceed at covid restrictions are lifted.    Evonne understands and will try again on Thurs in a reclined position.

## 2020-04-06 NOTE — TELEPHONE ENCOUNTER
----- Message from Marge Patton sent at 4/6/2020  2:21 PM CDT -----  Evonne is calling on behalf the pt and stating that the pt Xander is not working and is to high and would like for the nurse to give her a call back at [phone]

## 2020-04-17 ENCOUNTER — PATIENT MESSAGE (OUTPATIENT)
Dept: PODIATRY | Facility: CLINIC | Age: 60
End: 2020-04-17

## 2020-04-24 ENCOUNTER — TELEPHONE (OUTPATIENT)
Dept: INTERNAL MEDICINE | Facility: CLINIC | Age: 60
End: 2020-04-24

## 2020-04-24 ENCOUNTER — OFFICE VISIT (OUTPATIENT)
Dept: INTERNAL MEDICINE | Facility: CLINIC | Age: 60
End: 2020-04-24
Payer: MEDICARE

## 2020-04-24 ENCOUNTER — PATIENT MESSAGE (OUTPATIENT)
Dept: INTERNAL MEDICINE | Facility: CLINIC | Age: 60
End: 2020-04-24

## 2020-04-24 DIAGNOSIS — Z79.4 TYPE 2 DIABETES MELLITUS WITH CHRONIC KIDNEY DISEASE ON CHRONIC DIALYSIS, WITH LONG-TERM CURRENT USE OF INSULIN: Primary | ICD-10-CM

## 2020-04-24 DIAGNOSIS — E11.22 TYPE 2 DIABETES MELLITUS WITH CHRONIC KIDNEY DISEASE ON CHRONIC DIALYSIS, WITH LONG-TERM CURRENT USE OF INSULIN: Primary | ICD-10-CM

## 2020-04-24 DIAGNOSIS — Z99.2 TYPE 2 DIABETES MELLITUS WITH CHRONIC KIDNEY DISEASE ON CHRONIC DIALYSIS, WITH LONG-TERM CURRENT USE OF INSULIN: Primary | ICD-10-CM

## 2020-04-24 DIAGNOSIS — I10 ESSENTIAL HYPERTENSION: ICD-10-CM

## 2020-04-24 DIAGNOSIS — E78.5 HYPERLIPIDEMIA, UNSPECIFIED HYPERLIPIDEMIA TYPE: ICD-10-CM

## 2020-04-24 DIAGNOSIS — Z99.2 ESRD (END STAGE RENAL DISEASE) ON DIALYSIS: ICD-10-CM

## 2020-04-24 DIAGNOSIS — N18.6 ESRD (END STAGE RENAL DISEASE) ON DIALYSIS: ICD-10-CM

## 2020-04-24 DIAGNOSIS — N18.6 TYPE 2 DIABETES MELLITUS WITH CHRONIC KIDNEY DISEASE ON CHRONIC DIALYSIS, WITH LONG-TERM CURRENT USE OF INSULIN: Primary | ICD-10-CM

## 2020-04-24 DIAGNOSIS — E55.9 VITAMIN D DEFICIENCY: ICD-10-CM

## 2020-04-24 DIAGNOSIS — M06.9 RHEUMATOID ARTHRITIS INVOLVING MULTIPLE SITES, UNSPECIFIED RHEUMATOID FACTOR PRESENCE: ICD-10-CM

## 2020-04-24 PROCEDURE — 99442 PR PHYSICIAN TELEPHONE EVALUATION 11-20 MIN: ICD-10-PCS | Mod: HCNC,95,, | Performed by: NURSE PRACTITIONER

## 2020-04-24 PROCEDURE — 99442 PR PHYSICIAN TELEPHONE EVALUATION 11-20 MIN: CPT | Mod: HCNC,95,, | Performed by: NURSE PRACTITIONER

## 2020-04-24 RX ORDER — INSULIN GLARGINE 100 [IU]/ML
INJECTION, SOLUTION SUBCUTANEOUS
Qty: 6 ML | Refills: 6
Start: 2020-04-24 | End: 2021-02-09

## 2020-04-24 NOTE — PATIENT INSTRUCTIONS
Snacks can be an important part of a balanced, healthy meal plan. They allow you to eat more frequently, feeling full and satisfied throughout the day. Also, they allow you to spread carbohydrates evenly, which may stabilize blood sugars.  Plus, snacks are enjoyable!     The amount of carbohydrate needed at snacks varies. Generally, about 15-30 grams of carbohydrate per snack is recommended.  Below you will find some tasty treats.       0-5 gm carb   Crystal Light   Vitamin Water Zero   Herbal tea, unsweetened   2 tsp peanut butter on celery   1./2 cup sugar-free jell-o   1 sugar-free popsicle   ¼ cup blueberries   8oz Blue Chika unsweetened almond milk   5 baby carrots & celery sticks, cucumbers, bell peppers dipped in ¼ cup salsa, 2Tbsp light ranch dressing or 2Tbsp plain Greek yogurt   10 Goldfish crackers   ½ oz low-fat cheese or string cheese   1 closed handful of nuts, unsalted   1 Tbsp of sunflower seeds, unsalted   1 cup Smart Pop popcorn   1 whole grain brown rice cake        15 gm carb   1 small piece of fruit or ½ banana or 1/2 cup lite canned fruit   3 larissa cracker squares   3 cups Smart Pop popcorn, top spray butter, Sarmiento lite salt or cinnamon and Truvia   5 Vanilla Wafers   ½ cup low fat, no added sugar ice cream or frozen yogurt (Blue bell, Blue Bunny, Weight Watchers, Skinny Cow)   ½ turkey, ham, or chicken sandwich   ½ c fruit with ½ c Cottage cheese   4-6 unsalted wheat crackers with 1 oz low fat cheese or 1 tbsp peanut butter    30-45 goldfish crackers (depending on flavor)    7-8 Muslim mini brown rice cakes (caramel, apple cinnamon, chocolate)    12 Muslim mini brown rice cakes (cheddar, bbq, ranch)    1/3 cup hummus dip with raw veg   1/2 whole wheat triston, 1Tbsp hummus   Mini Pizza (1/2 whole wheat English muffin, low-fat  cheese, tomato sauce)   100 calorie snack pack (Oreo, Chips Ahoy, Ritz Mix, Baked Cheetos)   4-6 oz. light or Greek Style yogurt  (Kehinde, [place], [facility], [facility])   ½ cup sugar-free pudding     6 in. wheat tortilla or triston oven toasted chips (topped with spray butter flavoring, cinnamon, Truvia OR spray butter, garlic powder, chili powder)    18 BBQ Popchips (available at [employer], [employer], Fresh Market)

## 2020-04-24 NOTE — PROGRESS NOTES
Established Patient - Audio Only Telehealth Visit     The patient location is: home   The chief complaint leading to consultation is:  Type 2 dm   Visit type: Virtual visit with audio only (telephone)     The reason for the audio only service rather than synchronous audio and video virtual visit was related to technical difficulties or patient preference/necessity.  Time : 12 mins      Each patient to whom I provide medical services by telemedicine is:  (1) informed of the relationship between the physician and patient and the respective role of any other health care provider with respect to management of the patient; and (2) notified that they may decline to receive medical services by telemedicine and may withdraw from such care at any time. Patient verbally consented to receive this service via voice-only telephone call.       HPI: has h/o vit d, anemia, ESRD, H/D , HTN, partial symptomatic epilepsy- on keppra, PVD, pulm htn, AS    Current meds:   novolog over 200 will give +1, etc   lantus 11 units at night   Glipizide 2.5 mg tr24-non dialysis days     Lowest: 113  Highest: 220       Lab Results   Component Value Date    HGBA1C 7.5 (H) 03/13/2020       Assessment and plan:     1. Type 2 diabetes mellitus with chronic kidney disease on chronic dialysis, with long-term current use of insulin  Hemoglobin A1c   2. Essential hypertension     3. Rheumatoid arthritis involving multiple sites, unspecified rheumatoid factor presence     4. ESRD (end stage renal disease) on dialysis           1. F/u in 4 mos w/ me  a1c goal is less than 7.5%  a1c next time   Change basal to 12 units at night  Continue correction scale with prandial   Use low mills if sugar is >150 on nondialysis days  Discussed dietary habits   Has refills   2. Continue med(s)  F/u with dialysis center  3. May increase insulin resistance  4. Be mindful, avoid hypoglycemia         This service was not originating from a related E/M service provided within the  previous 7 days nor will  to an E/M service or procedure within the next 24 hours or my soonest available appointment.  Prevailing standard of care was able to be met in this audio-only visit.

## 2020-04-26 ENCOUNTER — PATIENT MESSAGE (OUTPATIENT)
Dept: INTERNAL MEDICINE | Facility: CLINIC | Age: 60
End: 2020-04-26

## 2020-04-27 NOTE — TELEPHONE ENCOUNTER
Damaris/Myles, can you call Reina and R/S Ms Ayala to a Virtual Visit Wednesday at either 8:00AM or 12:30PM?

## 2020-04-27 NOTE — TELEPHONE ENCOUNTER
Isaac, please call Ms Ayala/Reina to confirm her Video appt tomorrow(Tuesday).  I was hoping Reina could help her do a Video visit so I could see her foot/leg.  Thanks

## 2020-04-29 ENCOUNTER — OFFICE VISIT (OUTPATIENT)
Dept: PHYSICAL MEDICINE AND REHAB | Facility: CLINIC | Age: 60
End: 2020-04-29
Payer: MEDICARE

## 2020-04-29 ENCOUNTER — OFFICE VISIT (OUTPATIENT)
Dept: INTERNAL MEDICINE | Facility: CLINIC | Age: 60
End: 2020-04-29
Payer: MEDICARE

## 2020-04-29 ENCOUNTER — PATIENT OUTREACH (OUTPATIENT)
Dept: ADMINISTRATIVE | Facility: OTHER | Age: 60
End: 2020-04-29

## 2020-04-29 DIAGNOSIS — M79.675 PAIN OF LEFT GREAT TOE: Primary | ICD-10-CM

## 2020-04-29 DIAGNOSIS — M62.838 MUSCLE SPASTICITY: ICD-10-CM

## 2020-04-29 DIAGNOSIS — M54.50 MIDLINE LOW BACK PAIN WITHOUT SCIATICA, UNSPECIFIED CHRONICITY: ICD-10-CM

## 2020-04-29 DIAGNOSIS — I69.359 HEMIPLEGIA AS LATE EFFECT OF STROKE: ICD-10-CM

## 2020-04-29 DIAGNOSIS — Z99.2 ESRD (END STAGE RENAL DISEASE) ON DIALYSIS: ICD-10-CM

## 2020-04-29 DIAGNOSIS — I63.9 CEREBROVASCULAR ACCIDENT (CVA), UNSPECIFIED MECHANISM: Primary | ICD-10-CM

## 2020-04-29 DIAGNOSIS — N18.6 ESRD (END STAGE RENAL DISEASE) ON DIALYSIS: ICD-10-CM

## 2020-04-29 PROCEDURE — 99213 PR OFFICE/OUTPT VISIT, EST, LEVL III, 20-29 MIN: ICD-10-PCS | Mod: HCNC,95,, | Performed by: INTERNAL MEDICINE

## 2020-04-29 PROCEDURE — 99215 PR OFFICE/OUTPT VISIT, EST, LEVL V, 40-54 MIN: ICD-10-PCS | Mod: HCNC,95,, | Performed by: PHYSICAL MEDICINE & REHABILITATION

## 2020-04-29 PROCEDURE — 99215 OFFICE O/P EST HI 40 MIN: CPT | Mod: HCNC,95,, | Performed by: PHYSICAL MEDICINE & REHABILITATION

## 2020-04-29 PROCEDURE — 99213 OFFICE O/P EST LOW 20 MIN: CPT | Mod: HCNC,95,, | Performed by: INTERNAL MEDICINE

## 2020-04-29 RX ORDER — DANTROLENE SODIUM 50 MG/1
CAPSULE ORAL
Qty: 180 CAPSULE | Refills: 2 | Status: ON HOLD | OUTPATIENT
Start: 2020-04-29 | End: 2020-07-27

## 2020-04-29 RX ORDER — CYCLOBENZAPRINE HCL 5 MG
TABLET ORAL
Qty: 30 TABLET | Refills: 0 | Status: SHIPPED | OUTPATIENT
Start: 2020-04-29

## 2020-04-29 RX ORDER — DICLOFENAC SODIUM 10 MG/G
GEL TOPICAL
Qty: 100 G | Refills: 0 | Status: SHIPPED | OUTPATIENT
Start: 2020-04-29 | End: 2020-05-20 | Stop reason: SDUPTHER

## 2020-04-29 RX ORDER — PREGABALIN 25 MG/1
25 CAPSULE ORAL DAILY
Qty: 135 CAPSULE | Refills: 1 | Status: ON HOLD | OUTPATIENT
Start: 2020-04-29 | End: 2020-07-27

## 2020-04-29 NOTE — LETTER
April 30, 2020      Beverly Muniz MD  [address]  [facility] [address]           [facility]  [address]  [facility] [address]  Phone: [phone]          Patient: Lucy Perez   MR Number: [id number]   YOB: 1960   Date of Visit: 4/29/2020       Dear Dr. Beverly Muniz:    Thank you for referring Lucy Perez to me for evaluation. Attached you will find relevant portions of my assessment and plan of care.    If you have questions, please do not hesitate to call me. I look forward to following Lucy Perez along with you.    Sincerely,    Nj eRed MD    Enclosure  CC:  No Recipients    If you would like to receive this communication electronically, please contact [email or web address] or [phone] to request more information on "[employer] Link access.    For providers and/or their staff who would like to refer a patient to [facility], please contact us through our one-stop-shop provider referral line, [facility], at [phone].    If you feel you have received this communication in error or would no longer like to receive these types of communications, please e-mail [email or web address]

## 2020-04-29 NOTE — PROGRESS NOTES
"Subjective:    The patient location is: Home  The chief complaint leading to consultation is: Toe pain  Visit type: audiovisual  Total time spent with patient: 20 minutes  Each patient to whom he or she provides medical services by telemedicine is:  (1) informed of the relationship between the physician and patient and the respective role of any other health care provider with respect to management of the patient; and (2) notified that he or she may decline to receive medical services by telemedicine and may withdraw from such care at any time.       Patient ID: Lucy Perez is a 59 y.o. female.    Chief Complaint:   Toe Pain    HPI: Mrs Ayala today presents by Virtual Video visit.  She has had Left podagral toe pain x 1 month since stubbing it on  Her right heel. Pic in media 4/17 shows some erythema/swelling at PIP and medial shaft of the 1st metatarsal bone.  The redness is gone but toe still hurts-no D/C and no F/C    Past Medical, Surgical, Social History: Please see as stated in Epic chart which has been reviewed.    Current Outpatient Medications   Medication Sig Dispense Refill    acetaminophen (TYLENOL) 325 MG tablet Take 2 tablets (650 mg total) by mouth every 6 (six) hours as needed for Pain.  0    albuterol (VENTOLIN HFA) 90 mcg/actuation inhaler Inhale 2 puffs into the lungs every 6 (six) hours as needed for Wheezing. Rescue 18 g 0    aspirin (ECOTRIN) 81 MG EC tablet Take 81 mg by mouth every morning.       atorvastatin (LIPITOR) 40 MG tablet TAKE 1 TABLET ONE TIME DAILY FOR CHOLESTEROL 90 tablet 2    BD INSULIN PEN NEEDLE UF SHORT 31 gauge x 5/16" Ndle USE TO INJECT NOVOLOG FLEXPEN BEFORE MEALS 150 each 11    bisacodyl (DULCOLAX) 10 mg Supp Place 1 suppository (10 mg total) rectally daily as needed. 30 suppository 3    blood sugar diagnostic Strp To check BG 4  times daily, to use with insurance preferred meter-true metrix 400 each 3    blood-glucose meter kit To check BG 4 times " daily, to use with insurance preferred meter-true metrix 1 each 1    clopidogrel (PLAVIX) 75 mg tablet Take 1 tablet (75 mg total) by mouth once daily. 120 tablet 6    cyclobenzaprine (FLEXERIL) 5 MG tablet 1 tab Q8 hours as needed for back pain 30 tablet 0    diclofenac sodium (VOLTAREN) 1 % Gel Apply 2gm to toe 2-3x/day as needed 100 g 0    ergocalciferol (ERGOCALCIFEROL) 50,000 unit Cap Take 1 capsule (50,000 Units total) by mouth every 7 days. 12 capsule 2    famotidine (PEPCID) 20 MG tablet Take 1 tablet (20 mg total) by mouth once daily. 90 tablet 2    FLUoxetine 20 MG capsule Take 1 capsule (20 mg total) by mouth once daily. 90 capsule 1    folic acid (FOLVITE) 1 MG tablet Take 1 tablet (1,000 mcg total) by mouth once daily. 90 tablet 2    glipiZIDE (GLUCOTROL) 2.5 MG TR24 Take 1 tablet (hold if less than 150) at lunch (non dialysis days) 30 tablet 4    insulin (LANTUS SOLOSTAR U-100 INSULIN) glargine 100 units/mL (3mL) SubQ pen Inject 12 units at night. 6 mL 6    insulin aspart U-100 (NOVOLOG U-100 INSULIN ASPART) 100 unit/mL injection Use correction scale 180-230+1, 231-280+2, 281-330+3, 331-380+4, >380+5, max 15 units. 3 vial 3    levETIRAcetam (KEPPRA) 1000 MG tablet Take 1 tablet (1,000 mg total) by mouth once daily. (Patient taking differently: Take 1,000 mg by mouth once daily. On non dialysis days) 90 tablet 3    levETIRAcetam (KEPPRA) 500 MG Tab Take 1 tablet (500 mg total) by mouth every Mon, Wed, Fri. Monday Wednesday and Friday after DIALYSIS take additional 500mg (Patient taking differently: Take 500 mg by mouth 2 (two) times daily. Twice daily on dialysis days) 36 tablet 3    lidocaine (LIDODERM) 5 % Place 1 patch onto the skin once daily. Remove & Discard patch within 12 hours or as directed by MD 10 patch 1    LORazepam (ATIVAN) 0.5 MG tablet 1 tab 1 hour prior to Dialysis 30 tablet 0    ondansetron (ZOFRAN-ODT) 4 MG TbDL Take 1 tablet (4 mg total) by mouth every 8 (eight) hours  "as needed. 10 tablet 0    oxyCODONE-acetaminophen (PERCOCET) 5-325 mg per tablet Take 1 tablet by mouth every 4 (four) hours as needed for Pain (Do not drive while taking pain medications). 21 tablet 0    pen needle, diabetic (BD EMI 2ND GEN PEN NEEDLE) 32 gauge x 5/32" Ndle Uses once a day, 90 day 100 each 3    polyethylene glycol (MIRALAX) 17 gram/dose powder Take 17 g by mouth 2 (two) times daily. 1 Bottle 6    pregabalin (LYRICA) 25 MG capsule Take 1 capsule (25 mg total) by mouth once daily. May take additional dose after HD. 135 capsule 1    sevelamer carbonate (RENVELA) 800 mg Tab Take 800 mg by mouth 3 (three) times daily with meals.      TRUEPLUS LANCETS 33 gauge Misc        No current facility-administered medications for this visit.      Facility-Administered Medications Ordered in Other Visits   Medication Dose Route Frequency Provider Last Rate Last Dose    lidocaine (PF) 10 mg/ml (1%) injection 10 mg  1 mL Intradermal Once Bessie Butt MD           Review of Systems   Musculoskeletal: Positive for back pain and joint swelling.        +Left toe pain  +LBP after dialysis/transient   Neurological:        +Left sided post stroke pain -pt ran out of lyrica       Objective:      Lab Results   Component Value Date    WBC 5.97 03/13/2020    HGB 10.2 (L) 03/13/2020    HCT 34.7 (L) 03/13/2020     03/13/2020    CHOL 134 03/13/2020    TRIG 107 03/13/2020    HDL 50 03/13/2020    ALT <5 (L) 03/13/2020    AST 6 (L) 03/13/2020     03/13/2020    K 5.6 (H) 03/13/2020    CL 99 03/13/2020    CREATININE 10.9 (H) 03/13/2020    BUN 76 (H) 03/13/2020    CO2 25 03/13/2020    TSH 0.909 03/13/2020    INR 1.0 03/04/2020    GLUF 190 (H) 10/04/2004    HGBA1C 7.5 (H) 03/13/2020     Physical Exam   Musculoskeletal: She exhibits edema and tenderness.   Left podagra shows mild swelling at PIP joint-no erythema or drainage  + Mild tenderness when palpated by daughter, Reina          ]    Assessment:       1. " Pain of left great toe    2. Midline low back pain without sciatica, unspecified chronicity    3. Muscle spasticity        Plan:     Health Maintenance   Topic Date Due    Pneumococcal Vaccine (Highest Risk) (1 of 3 - PCV13) 08/24/1977    Hemoglobin A1c  09/13/2020    Foot Exam  09/17/2020    Eye Exam  09/24/2020    Mammogram  10/03/2020    Lipid Panel  03/13/2021    Colonoscopy  02/13/2022    TETANUS VACCINE  01/01/2029    Hepatitis C Screening  Completed    Fecal Occult Blood Test (FOBT)/[employer]Kit  Discontinued        Lucy was seen today for toe pain.    Diagnoses and all orders for this visit:    Pain of left great toe/?Gout vs Sprain  -     CBC auto differential; Future  -     Uric acid; Future  -     C-reactive protein; Future  -     diclofenac sodium (VOLTAREN) 1 % Gel; Apply 2gm to toe 2-3x/day as needed  -     If no better, will have to order X-rays    Midline low back pain without sciatica, unspecified chronicity  -     cyclobenzaprine (FLEXERIL) 5 MG tablet; 1 tab Q8 hours as needed for back pain    Muscle spasticity  -     pregabalin (LYRICA) 25 MG capsule; Take 1 capsule (25 mg total) by mouth once daily. May take additional dose after HD.

## 2020-04-29 NOTE — PROGRESS NOTES
MOBILITY EVALUATION ENCOUNTER  [facility]    Chief Complaint   Patient presents with    Neurologic Problem     mobility eval     Beverly Muniz MD  ENCOUNTER DATE: 04/29/2020      HPI: Lucy Perez is a 59 y.o. female who presents today for follow-up for mobility evaluation of therapy services after completion of inpatient rehabilitation due to  CVA. The patient was referred by Beverly Muniz MD.    The patient is a 56 year old right-handed AAF with history of right subarachnoid hemorrhage requiring hemicraniectomy on 8/13/2016 who required PEG and underwent inpatient rehab at [facility]. She has been mostly treated with home health since discharge from nursing home and has been going through home health. She has undergone multiple treatments for central pain syndrome versus left arm spasticity, including gabapentin, tegretol, and dantrolene. Otherwise, she has been fully independent due to poor motor return. Her family has been providing care for her since discharge and she presents to clinic for evaluation of mobility needs. She has been non-ambulatory for over one year and has been requiring hospital bed. She has hemiwalker for which she using with physical therapy but made no progress due to poor motor return on the left side. She has a manual wheelchair but requires family involvement to propel. No pressure ulcers according to family      Co-Morbid Medical conditions limiting function: CVA in 2016 with left hemiparesis, ESRD on (Tu/Th/Sat)  Symptoms limiting ambulation: Decreased endurance, left sided weakness   Functional decline of ambulation: Unable to bear weight to the right-side   Distance to ambulate with assistive device: Not able to assistance, not able to propel her manual wheelchair unassisted   Current assistive device: Non-ambulatory, has hemiwalker  Ability to stand from seated position: Need assistance using a Lizbeth lift to perform  transfers.    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    Cortical cataract of both eyes 7/26/2016    Decubitus ulcer of buttock, stage 2     Degeneration of lumbar or lumbosacral intervertebral disc 3/5/2013    S/p MRI L-spine 5/2009     Depression     Encounter for blood transfusion     ESRD on hemodialysis     Started August 2018    General anesthetics causing adverse effect in therapeutic use     Hemorrhagic cerebrovascular accident (CVA)     8/2016 s/p Hemicraniotomy at [facility] with Left hemiparesis    History of stroke 6/28/2017    s/p R-MCA stroke with R-putaminal hemorrhagic transformation in 8/2016 and 11/2016 (s/p hemicraniotomy at [facility]) with residual L hemiparesis, on AED s/p CVA      Hypertensive retinopathy of both eyes 7/26/2016    Impingement syndrome of right shoulder 7/2/2014    Obesity     THERESA (obstructive sleep apnea) 3/5/2013    No Home CPAP 2ndary to cost     Partial symptomatic epilepsy with complex partial seizures, not intractable, without status epilepticus     Rheumatoid arthritis(714.0)     Rotator cuff tear 7/2/2014    Sarcoidosis     Stroke 2016    left sided flaccidity, SAH    Vertebral artery stenosis 3/5/2013    S/p Stenting per Dr Burnett      Past Surgical History:   Procedure Laterality Date    BREAST SURGERY      breast reduction    COLONOSCOPY N/A 8/11/2016    Procedure: COLONOSCOPY;  Surgeon: Jerry Vilchis MD;  Location: [address];  Service: Endoscopy;  Laterality: N/A;  Patient reports difficulty awaking from anesthesia in the past.    DECLOTTING OF VASCULAR GRAFT Right 6/20/2019    Procedure: DECLOT-GRAFT;  Surgeon:  Cabrera Irwin MD;  Location: [facility] CATH LAB;  Service: Cardiology;  Laterality: Right;    DECLOTTING OF VASCULAR GRAFT Right 12/6/2019    Procedure: DECLOT-GRAFT;  Surgeon: Cabrera Irwin MD;  [address];  Service: Peripheral Vascular;  Laterality: Right;    FISTULOGRAM Right 2/11/2019    Procedure: Fistulogram;  Surgeon: Meir Valencia MD;  Location: [facility] CATH LAB;  Service: Peripheral Vascular;  Laterality: Right;    FISTULOGRAM Right 7/8/2019    Procedure: Fistulogram;  Surgeon: WELLINGTON Palm III, MD;  Location: [facility] CATH LAB;  Service: Peripheral Vascular;  Laterality: Right;    FISTULOGRAM Right 12/6/2019    Procedure: Fistulogram;  Surgeon: Cabrera Irwin MD;  [address];  Service: Peripheral Vascular;  Laterality: Right;    FISTULOGRAM Right 3/12/2020    Procedure: FISTULOGRAM;  Surgeon: Meir Valencia MD;  Location: [facility] CATH LAB;  Service: Peripheral Vascular;  Laterality: Right;    HYSTERECTOMY  1999    JOSE LUIS/BSO (AUB)    LAPAROSCOPIC LYSIS OF ADHESIONS N/A 3/9/2020    Procedure: LYSIS, ADHESIONS, LAPAROSCOPIC;  Surgeon: Paige Monsalve MD;  [address];  Service: General;  Laterality: N/A;    PLACEMENT OF ARTERIOVENOUS GRAFT Right 10/18/2018    Procedure: AV GRAFT CREATION;  Surgeon: Meir Valencia MD;  Location: [address];  Service: Cardiovascular;  Laterality: Right;    ROTATOR CUFF REPAIR Right July 9, 2014    right side    Skull surgery      Aneurysm    stent placed      in vertebral artery    TOTAL REDUCTION MAMMOPLASTY      TUBAL LIGATION       Current Outpatient Medications on File Prior to Visit   Medication Sig Dispense Refill    acetaminophen (TYLENOL) 325 MG tablet Take 2 tablets (650 mg total) by mouth every 6 (six) hours as needed for Pain.  0    albuterol (VENTOLIN HFA) 90 mcg/actuation inhaler Inhale 2 puffs into the lungs every 6 (six) hours as needed for Wheezing. Rescue 18 g 0    aspirin  "(ECOTRIN) 81 MG EC tablet Take 81 mg by mouth every morning.       atorvastatin (LIPITOR) 40 MG tablet TAKE 1 TABLET ONE TIME DAILY FOR CHOLESTEROL 90 tablet 2    BD INSULIN PEN NEEDLE UF SHORT 31 gauge x 5/16" Ndle USE TO INJECT NOVOLOG FLEXPEN BEFORE MEALS 150 each 11    bisacodyl (DULCOLAX) 10 mg Supp Place 1 suppository (10 mg total) rectally daily as needed. 30 suppository 3    blood sugar diagnostic Strp To check BG 4  times daily, to use with insurance preferred meter-true metrix 400 each 3    blood-glucose meter kit To check BG 4 times daily, to use with insurance preferred meter-true metrix 1 each 1    clopidogrel (PLAVIX) 75 mg tablet Take 1 tablet (75 mg total) by mouth once daily. 120 tablet 6    ergocalciferol (ERGOCALCIFEROL) 50,000 unit Cap Take 1 capsule (50,000 Units total) by mouth every 7 days. 12 capsule 2    famotidine (PEPCID) 20 MG tablet Take 1 tablet (20 mg total) by mouth once daily. 90 tablet 2    FLUoxetine 20 MG capsule Take 1 capsule (20 mg total) by mouth once daily. 90 capsule 1    folic acid (FOLVITE) 1 MG tablet Take 1 tablet (1,000 mcg total) by mouth once daily. 90 tablet 2    glipiZIDE (GLUCOTROL) 2.5 MG TR24 Take 1 tablet (hold if less than 150) at lunch (non dialysis days) 30 tablet 4    insulin (LANTUS SOLOSTAR U-100 INSULIN) glargine 100 units/mL (3mL) SubQ pen Inject 12 units at night. 6 mL 6    insulin aspart U-100 (NOVOLOG U-100 INSULIN ASPART) 100 unit/mL injection Use correction scale 180-230+1, 231-280+2, 281-330+3, 331-380+4, >380+5, max 15 units. 3 vial 3    levETIRAcetam (KEPPRA) 1000 MG tablet Take 1 tablet (1,000 mg total) by mouth once daily. (Patient taking differently: Take 1,000 mg by mouth once daily. On non dialysis days) 90 tablet 3    levETIRAcetam (KEPPRA) 500 MG Tab Take 1 tablet (500 mg total) by mouth every Mon, Wed, Fri. Monday Wednesday and Friday after DIALYSIS take additional 500mg (Patient taking differently: Take 500 mg by mouth 2 " "(two) times daily. Twice daily on dialysis days) 36 tablet 3    lidocaine (LIDODERM) 5 % Place 1 patch onto the skin once daily. Remove & Discard patch within 12 hours or as directed by MD 10 patch 1    LORazepam (ATIVAN) 0.5 MG tablet 1 tab 1 hour prior to Dialysis 30 tablet 0    ondansetron (ZOFRAN-ODT) 4 MG TbDL Take 1 tablet (4 mg total) by mouth every 8 (eight) hours as needed. 10 tablet 0    oxyCODONE-acetaminophen (PERCOCET) 5-325 mg per tablet Take 1 tablet by mouth every 4 (four) hours as needed for Pain (Do not drive while taking pain medications). 21 tablet 0    pen needle, diabetic (BD EMI 2ND GEN PEN NEEDLE) 32 gauge x 5/32" Ndle Uses once a day, 90 day 100 each 3    polyethylene glycol (MIRALAX) 17 gram/dose powder Take 17 g by mouth 2 (two) times daily. 1 Bottle 6    sevelamer carbonate (RENVELA) 800 mg Tab Take 800 mg by mouth 3 (three) times daily with meals.      TRUEPLUS LANCETS 33 gauge Misc       [DISCONTINUED] pregabalin (LYRICA) 25 MG capsule Take 1 capsule (25 mg total) by mouth once daily. May take additional dose after HD. 90 capsule 4     Current Facility-Administered Medications on File Prior to Visit   Medication Dose Route Frequency Provider Last Rate Last Dose    lidocaine (PF) 10 mg/ml (1%) injection 10 mg  1 mL Intradermal Once Bessie Butt MD         Review of patient's allergies indicates:   Allergen Reactions    Lisinopril Other (See Comments)     Angioedema      Vicodin [hydrocodone-acetaminophen] Rash     No problem with acetaminophen        Family History:   Family History   Problem Relation Age of Onset    Diabetes Mother     Hypertension Mother     Heart disease Mother     Heart attack Mother     Breast cancer Mother     Stroke Sister     Hypertension Sister     Sleep apnea Sister     No Known Problems Daughter     Diabetes Son     Bell's palsy Sister     Lupus Sister     Blindness Maternal Grandmother     Diabetes Unknown         "My entire " "family family has diabetes"    Stroke Maternal Aunt     Colon cancer Neg Hx     Ovarian cancer Neg Hx         Social History: Lives with family in a single story house, with 10 steps to enter     Barthel Index:     Feeding: ( ) unable (0)   (x) setup assistance/modified diet (5)     ( ) Independent(10)      Bathing (x) Needs assistance (0) ( )Independent (5)    Grooming  ( ) Needs assistance (0) ( )Independent (5)    Dressing (x) Needs assistance (0) ( )Can do 50& (5)     ( ) Independent (10)    Bowel  (x) Needs assistance (0) ( ) Occasional accident (5)  ( ) Independent  (10)     Bladder (x) Needs assistance (0) ( ) Occasional accident (5)  ( ) Independent  (10)    Toilet Use (x) Needs assistance (0) ( ) Partial assistance (5)  ( ) Independent  (10)     Mobility: (x) Immobile/<150 feet (0) ( ) Wheelchair independent/>150 feet (5)     ( ) Walks with one person assistance, > 150 feet (10)     ( ) Independent +/- AD, g> 150 feet (15)     Transfers ( ) No sitting balance/unable (0)     (x) Need assistance to sit       (5)     ( ) Verbal/physical assistance (10)     ( ) Independent (15)     Stairs  (x) Unable (0)     ( ) Need verbal, physical, assistance (5)     ( ) Independent (10)     Tobacco:  denies   ETOH:  denies   Other drug use: denies        Review of Systems   Neurological: Positive for weakness.   All other systems reviewed and are negative.       Pertinent Prior Work Up (Imaging/EMGs):    [facility] (10/7/2019):    No evidence of acute intracranial abnormality.    Encephalomalacia involving the right MCA territory from remote infarct, unchanged.    Generalized cerebral volume loss, chronic microvascular ischemic changes, and small remote infarct within left basal ganglia.      Impression: 59 y.o. female who presents with multiple conditions including SAH status post left hemiplegia which has limited her daily physical functioning and activities of daily living This encounter is for virtual health evaluation for " powered mobility device/scooter.    Lucy was seen today for neurologic problem.    Diagnoses and all orders for this visit:    Cerebrovascular accident (CVA), unspecified mechanism  -     Ambulatory referral/consult to Physical Medicine Rehab  -     PT wheelchair eval; Future    LEFT Hemiplegia as late effect of stroke  -     PT wheelchair eval; Future    ESRD (end stage renal disease) on dialysis  -     PT wheelchair eval; Future    Other orders  -     dantrolene (DANTRIUM) 50 MG Cap; Take one capsule three time daily for 1 week, then increase two tablet      Plan:        Length of need: Lifetime   Virtual health evaluation: 04/29/2020    - The patient was seen today for mobility evaluation for a power mobility device due to significant impairment at home.  - The patient has multifactorial gait impairment due to left sided weakness from stroke.  - The patient is not able to ambulate safely to the kitchen or living room.  - The patient is unable to use a walker functional distances due to left hemiplegia, spasticity, and poor endurance.   - The patient is unable to use an optimally-configured manual wheelchair at home due  left hemiplegia, spasticity, and poor endurance.   - The patient has intact cognition and should be able to use a power mobility device well at home.  - The patient was given a prescription for a power wheelchair.  - A scooter would not be appropriate due the patient's trouble clearing the ledge, difficulty controlling the scooter tiller due to shoulder pain and to maneuverability restrictions at home.   - This will allow the patient to go safely to the kitchen, dining room or living room for feeding & socialization. It should also help with energy conservation and improving safety.  - The patient is to return the Physical Medicine/Mobility clinic prn.      The patient location is: [place]  The chief complaint leading to consultation is for impaired mobility  Visit type: audiovisual  Total time  spent with patient: 35 minutes  Each patient to whom he or she provides medical services by telemedicine is:  (1) informed of the relationship between the physician and patient and the respective role of any other health care provider with respect to management of the patient; and (2) notified that he or she may decline to receive medical services by telemedicine and may withdraw from such care at any time.    Follow-up prn    50% of the total time of 40 minutes was spent in counseling on diagnosis and treatment options        Nj Reed MD

## 2020-05-05 ENCOUNTER — PATIENT MESSAGE (OUTPATIENT)
Dept: INTERNAL MEDICINE | Facility: CLINIC | Age: 60
End: 2020-05-05

## 2020-05-20 ENCOUNTER — TELEPHONE (OUTPATIENT)
Dept: INTERNAL MEDICINE | Facility: CLINIC | Age: 60
End: 2020-05-20

## 2020-05-20 DIAGNOSIS — M25.552 PAIN OF LEFT HIP JOINT: Primary | ICD-10-CM

## 2020-05-20 DIAGNOSIS — M79.675 PAIN OF LEFT GREAT TOE: ICD-10-CM

## 2020-05-20 RX ORDER — TRAMADOL HYDROCHLORIDE 50 MG/1
50 TABLET ORAL EVERY 8 HOURS PRN
Qty: 30 TABLET | Refills: 0 | Status: SHIPPED | OUTPATIENT
Start: 2020-05-20 | End: 2021-02-09 | Stop reason: SDUPTHER

## 2020-05-20 RX ORDER — DICLOFENAC SODIUM 10 MG/G
GEL TOPICAL
Qty: 100 G | Refills: 0 | Status: SHIPPED | OUTPATIENT
Start: 2020-05-20

## 2020-05-20 NOTE — TELEPHONE ENCOUNTER
Spoke with Reina, Ms. Ayala is c/o left hip pain for 2 days. She states that Ms. yAala is unable to sleep because the pain is unbearable. The pt is also c/o sharp pains in her head on the right side which started today.Pt BP was 165/88 and her Blood sugar was 220. Please Advise.

## 2020-05-20 NOTE — TELEPHONE ENCOUNTER
----- Message from Aga Caraballo sent at 5/20/2020  3:09 PM CDT -----  Contact: Self   Would like to get medical advice.  Symptoms (please be specific):  Pain left side near hip  How long has patient had these symptoms:  A week  Pharmacy name and phone #:  Would like call first  Any drug allergies (copy from chart):      Would the patient rather a call back or a response via [facility]?:  Call back  Comments:

## 2020-05-21 ENCOUNTER — PATIENT MESSAGE (OUTPATIENT)
Dept: INTERNAL MEDICINE | Facility: CLINIC | Age: 60
End: 2020-05-21

## 2020-05-21 NOTE — TELEPHONE ENCOUNTER
Paresh (Pharmacist) called to make sure you are aware that the pt is taking Lorazepam and Tramadol. He wants to make sure that the pt was advised that the combination can cause opiod oversose. Please Advise

## 2020-05-21 NOTE — TELEPHONE ENCOUNTER
Isaac, please call the pharmacist to let him know that pt only takes the Lorazepam tab prior to Dialysis(3 days/week).  Thanks

## 2020-05-22 ENCOUNTER — PATIENT MESSAGE (OUTPATIENT)
Dept: SURGERY | Facility: CLINIC | Age: 60
End: 2020-05-22

## 2020-05-25 ENCOUNTER — TELEPHONE (OUTPATIENT)
Dept: SURGERY | Facility: CLINIC | Age: 60
End: 2020-05-25

## 2020-05-25 DIAGNOSIS — Z01.818 PRE-OP TESTING: Primary | ICD-10-CM

## 2020-05-27 ENCOUNTER — TELEPHONE (OUTPATIENT)
Dept: SURGERY | Facility: CLINIC | Age: 60
End: 2020-05-27

## 2020-05-27 DIAGNOSIS — N18.6 ESRD (END STAGE RENAL DISEASE): Primary | ICD-10-CM

## 2020-06-05 ENCOUNTER — TELEPHONE (OUTPATIENT)
Dept: SURGERY | Facility: CLINIC | Age: 60
End: 2020-06-05

## 2020-06-05 ENCOUNTER — ANESTHESIA EVENT (OUTPATIENT)
Dept: SURGERY | Facility: HOSPITAL | Age: 60
End: 2020-06-05
Payer: MEDICARE

## 2020-06-05 NOTE — PRE-PROCEDURE INSTRUCTIONS
Spoke with pt to review Pre-op instructions and to perform medication review - what to take/hold leading up to SX scheduled Monday, June 8, 2020.   Pt unsure of many medication doses/times - pt reports that her daughter, Reina, handles her medication. Pt asked that this RN contact her daughter to continue review. Confirmed IOC signed and scanned to Epic.

## 2020-06-05 NOTE — PRE-PROCEDURE INSTRUCTIONS
PREOP INSTRUCTIONS: Given to Pt's Daughter - Reina Perez No solid food ,milk or milk products for 8 hours prior to procedure.Clear liquids are allowed up to 2 hours before procedure.Clear liquids are:water,apple juice,gatorade & powerade.Shower instructions as well as directions to the [facility] were given.Patient encouraged to wear loose fitting,comfortable clothing.Medication instructions for pm prior to and am of procedure reviewed.Instructed patient to avoid taking vitamins,supplements,aspirin and ibuprofen the morning of surgery. Patient stated an understanding.Patient instructed to take temperature the night before surgery as well as the morning of surgery and to notify [facility] at [phone] if it is 100.4 or above.Patient also informed of the current visitor policy and advised patient that one visitor may accompany them into the hospital and wait (socially distanced) .When they enter the hospital both patient and visitor will have their temperature checked,provided a mask and provided assistance to their destination.No visitors are allowed in the inpatient areas of the hospital.     Covid screening completed 6/6 /2020 and results are pending.  Patient and  Daughter - Reina Perez denies any side effects or issues with anesthesia or sedation.     - JACOBY WILL ACCOMPANY PT THE DOS  PT IN  - NEEDS ASSISTANCE W/TRANSFER

## 2020-06-06 ENCOUNTER — LAB VISIT (OUTPATIENT)
Dept: INTERNAL MEDICINE | Facility: CLINIC | Age: 60
End: 2020-06-06
Payer: MEDICARE

## 2020-06-06 DIAGNOSIS — Z01.818 PRE-OP TESTING: ICD-10-CM

## 2020-06-06 LAB — SARS-COV-2 RNA RESP QL NAA+PROBE: NOT DETECTED

## 2020-06-06 PROCEDURE — U0003 INFECTIOUS AGENT DETECTION BY NUCLEIC ACID (DNA OR RNA); SEVERE ACUTE RESPIRATORY SYNDROME CORONAVIRUS 2 (SARS-COV-2) (CORONAVIRUS DISEASE [COVID-19]), AMPLIFIED PROBE TECHNIQUE, MAKING USE OF HIGH THROUGHPUT TECHNOLOGIES AS DESCRIBED BY CMS-2020-01-R: HCPCS | Mod: HCNC

## 2020-06-07 NOTE — ANESTHESIA PREPROCEDURE EVALUATION
[facility]  Anesthesia Pre-Operative Evaluation         Patient Name: Lucy Perez  YOB: 1960  MRN: [id number]    SUBJECTIVE:     Pre-operative evaluation for Procedure(s) (LRB):  LAPAROSCOPY, DIAGNOSTIC (POSSIBLE PD CATH REVISION) (N/A)     06/07/2020    Lucy Perez is a 59 y.o. female w/ a significant PMHx of SAH s/p hemicrani 2016, hx of R MCA CVA w/left hemiplegia,,  ESRD (HD T/Th/S), HTN, THERESA , epilepsy, DM2,     Patient now presents for the above procedure(s).      LDA:       Hemodialysis AV Graft Right upper arm (Active)   Number of days:        Prev airway: DL grade 1 view w/anne 2    Drips: None documented.      Patient Active Problem List   Diagnosis    Hyperlipidemia    Mixed anxiety and depressive disorder    THERESA (obstructive sleep apnea)    Sarcoidosis    Pulmonary hypertension    Left hemiparesis    Central pain syndrome    Partial symptomatic epilepsy with complex partial seizures, not intractable, without status epilepticus    Slow transit constipation    Anemia in ESRD (end-stage renal disease)    Essential hypertension    Folic acid deficiency    Screening for colorectal cancer    LEFT Hemiplegia as late effect of stroke    Vitamin D deficiency    Type 2 diabetes mellitus with chronic kidney disease on chronic dialysis, with long-term current use of insulin    Fatigue    Gall stones    Rheumatoid arthritis involving multiple sites    Positive depression screening    Atherosclerosis of aorta    Thrombosis of arteriovenous graft    Swelling of right upper extremity    Cholelithiasis    Problem with dialysis access    Hernia of abdominal wall    History of stroke    Problem with vascular access    ESRD (end stage renal disease) on dialysis    AV graft thrombosis, initial encounter    PVD (peripheral vascular disease)    Bilateral  carotid artery stenosis    ESRD (end stage renal disease)       Review of patient's allergies indicates:   Allergen Reactions    Lisinopril Other (See Comments)     Angioedema      Vicodin [hydrocodone-acetaminophen] Rash     No problem with acetaminophen        Current Inpatient Medications:      Current Facility-Administered Medications on File Prior to Encounter   Medication Dose Route Frequency Provider Last Rate Last Dose    lidocaine (PF) 10 mg/ml (1%) injection 10 mg  1 mL Intradermal Once Bessie Butt MD         Current Outpatient Medications on File Prior to Encounter   Medication Sig Dispense Refill    aspirin (ECOTRIN) 81 MG EC tablet Take 81 mg by mouth every morning.       atorvastatin (LIPITOR) 40 MG tablet TAKE 1 TABLET ONE TIME DAILY FOR CHOLESTEROL 90 tablet 2    bisacodyl (DULCOLAX) 10 mg Supp Place 1 suppository (10 mg total) rectally daily as needed. 30 suppository 3    clopidogrel (PLAVIX) 75 mg tablet Take 1 tablet (75 mg total) by mouth once daily. 120 tablet 6    cyclobenzaprine (FLEXERIL) 5 MG tablet 1 tab Q8 hours as needed for back pain 30 tablet 0    diclofenac sodium (VOLTAREN) 1 % Gel Apply 2gm to toe or hip  2-3x/day as needed 100 g 0    ergocalciferol (ERGOCALCIFEROL) 50,000 unit Cap Take 1 capsule (50,000 Units total) by mouth every 7 days. 12 capsule 2    famotidine (PEPCID) 20 MG tablet Take 1 tablet (20 mg total) by mouth once daily. 90 tablet 2    FLUoxetine 20 MG capsule Take 1 capsule (20 mg total) by mouth once daily. 90 capsule 1    folic acid (FOLVITE) 1 MG tablet Take 1 tablet (1,000 mcg total) by mouth once daily. 90 tablet 2    levETIRAcetam (KEPPRA) 1000 MG tablet Take 1 tablet (1,000 mg total) by mouth once daily. (Patient taking differently: Take 1,000 mg by mouth once daily. On non dialysis days) 90 tablet 3    sevelamer carbonate (RENVELA) 800 mg Tab Take 800 mg by mouth 3 (three) times daily with meals.      traMADoL (ULTRAM) 50 mg tablet Take 1  "tablet (50 mg total) by mouth every 8 (eight) hours as needed for Pain. 1 tab every 8-12 hours as needed for pain 30 tablet 0    acetaminophen (TYLENOL) 325 MG tablet Take 2 tablets (650 mg total) by mouth every 6 (six) hours as needed for Pain.  0    albuterol (VENTOLIN HFA) 90 mcg/actuation inhaler Inhale 2 puffs into the lungs every 6 (six) hours as needed for Wheezing. Rescue 18 g 0    BD INSULIN PEN NEEDLE UF SHORT 31 gauge x 5/16" Ndle USE TO INJECT NOVOLOG FLEXPEN BEFORE MEALS 150 each 11    blood sugar diagnostic Strp To check BG 4  times daily, to use with insurance preferred meter-true metrix 400 each 3    blood-glucose meter kit To check BG 4 times daily, to use with insurance preferred meter-true metrix 1 each 1    dantrolene (DANTRIUM) 50 MG Cap Take one capsule three time daily for 1 week, then increase two tablet (Patient taking differently: Take 50 mg by mouth 3 (three) times daily. Take one capsule three time daily for 1 week, then increase two tablet) 180 capsule 2    glipiZIDE (GLUCOTROL) 2.5 MG TR24 Take 1 tablet (hold if less than 150) at lunch (non dialysis days) 30 tablet 4    insulin (LANTUS SOLOSTAR U-100 INSULIN) glargine 100 units/mL (3mL) SubQ pen Inject 12 units at night. (Patient taking differently: Inject 14 Units into the skin every evening. Inject 14 units at night.) 6 mL 6    insulin aspart U-100 (NOVOLOG U-100 INSULIN ASPART) 100 unit/mL injection Use correction scale 180-230+1, 231-280+2, 281-330+3, 331-380+4, >380+5, max 15 units. 3 vial 3    levETIRAcetam (KEPPRA) 500 MG Tab Take 1 tablet (500 mg total) by mouth every Mon, Wed, Fri. Monday Wednesday and Friday after DIALYSIS take additional 500mg (Patient taking differently: Take 500 mg by mouth 2 (two) times daily. Twice daily on dialysis days) 36 tablet 3    lidocaine (LIDODERM) 5 % Place 1 patch onto the skin once daily. Remove & Discard patch within 12 hours or as directed by MD 10 patch 1    LORazepam (ATIVAN) " "0.5 MG tablet 1 tab 1 hour prior to Dialysis 30 tablet 0    ondansetron (ZOFRAN-ODT) 4 MG TbDL Take 1 tablet (4 mg total) by mouth every 8 (eight) hours as needed. 10 tablet 0    pen needle, diabetic (BD EMI 2ND GEN PEN NEEDLE) 32 gauge x 5/32" Ndle Uses once a day, 90 day 100 each 3    polyethylene glycol (MIRALAX) 17 gram/dose powder Take 17 g by mouth 2 (two) times daily. 1 Bottle 6    pregabalin (LYRICA) 25 MG capsule Take 1 capsule (25 mg total) by mouth once daily. May take additional dose after HD. 135 capsule 1    TRUEPLUS LANCETS 33 gauge Misc          Past Surgical History:   Procedure Laterality Date    BREAST SURGERY      breast reduction    COLONOSCOPY N/A 8/11/2016    Procedure: COLONOSCOPY;  Surgeon: Jerry Vilchis MD;  Location: [facility] ([address] [place] [address]);  Service: Endoscopy;  Laterality: N/A;  Patient reports difficulty awaking from anesthesia in the past.    DECLOTTING OF VASCULAR GRAFT Right 6/20/2019    Procedure: DECLOT-GRAFT;  Surgeon: Cabrera Irwin MD;  Location: [facility] CATH LAB;  Service: Cardiology;  Laterality: Right;    DECLOTTING OF VASCULAR GRAFT Right 12/6/2019    Procedure: DECLOT-GRAFT;  Surgeon: Cabrera Irwin MD;  [address];  Service: Peripheral Vascular;  Laterality: Right;    FISTULOGRAM Right 2/11/2019    Procedure: Fistulogram;  Surgeon: Meir Valencia MD;  Location: [facility] CATH LAB;  Service: Peripheral Vascular;  Laterality: Right;    FISTULOGRAM Right 7/8/2019    Procedure: Fistulogram;  Surgeon: WELLINGTON Palm III, MD;  Location: [facility] CATH LAB;  Service: Peripheral Vascular;  Laterality: Right;    FISTULOGRAM Right 12/6/2019    Procedure: Fistulogram;  Surgeon: Cabrera Irwin MD;  [address];  Service: Peripheral Vascular;  Laterality: Right;    FISTULOGRAM Right 3/12/2020    Procedure: FISTULOGRAM;  Surgeon: Meir Valencia MD;  Location: [facility] CATH LAB;  Service: Peripheral Vascular;  Laterality: Right;    " HYSTERECTOMY  1999    JOSE LUIS/BSO (AUB)    LAPAROSCOPIC LYSIS OF ADHESIONS N/A 3/9/2020    Procedure: LYSIS, ADHESIONS, LAPAROSCOPIC;  Surgeon: Paige Monsalve MD;  Location: [facility] OR [address] [place] [address];  Service: General;  Laterality: N/A;    PLACEMENT OF ARTERIOVENOUS GRAFT Right 10/18/2018    Procedure: AV GRAFT CREATION;  Surgeon: Meir Valencia MD;  Location: [facility] OR [address] [place] [address];  Service: Cardiovascular;  Laterality: Right;    ROTATOR CUFF REPAIR Right July 9, 2014    right side    Skull surgery      Aneurysm    stent placed      in vertebral artery    TOTAL REDUCTION MAMMOPLASTY      TUBAL LIGATION         Social History     Socioeconomic History    Marital status:      Spouse name: Not on file    Number of children: Not on file    Years of education: Not on file    Highest education level: Not on file   Occupational History    Not on file   Social Needs    Financial resource strain: Somewhat hard    Food insecurity:     Worry: Never true     Inability: Never true    Transportation needs:     Medical: No     Non-medical: No   Tobacco Use    Smoking status: Never Smoker    Smokeless tobacco: Never Used   Substance and Sexual Activity    Alcohol use: Yes     Frequency: Monthly or less     Drinks per session: 1 or 2     Binge frequency: Never     Comment: occasional wine cooler     Drug use: Never    Sexual activity: Yes     Partners: Male     Birth control/protection: Post-menopausal   Lifestyle    Physical activity:     Days per week: 0 days     Minutes per session: 0 min    Stress: To some extent   Relationships    Social connections:     Talks on phone: More than three times a week     Gets together: Patient refused     Attends Nondenominational service: Not on file     Active member of club or organization: Yes     Attends meetings of clubs or organizations: More than 4 times per year     Relationship status:    Other Topics Concern    Not on file   Social History Narrative    .  2 children(Wilder and Reina). Does not work. Previously worked as a .        OBJECTIVE:     Vital Signs Range (Last 24H):         Significant Labs:  Lab Results   Component Value Date    WBC 5.97 03/13/2020    HGB 10.2 (L) 03/13/2020    HCT 34.7 (L) 03/13/2020     03/13/2020    CHOL 134 03/13/2020    TRIG 107 03/13/2020    HDL 50 03/13/2020    ALT <5 (L) 03/13/2020    AST 6 (L) 03/13/2020     03/13/2020    K 5.6 (H) 03/13/2020    CL 99 03/13/2020    CREATININE 10.9 (H) 03/13/2020    BUN 76 (H) 03/13/2020    CO2 25 03/13/2020    TSH 0.909 03/13/2020    INR 1.0 03/04/2020    GLUF 190 (H) 10/04/2004    HGBA1C 7.5 (H) 03/13/2020       Diagnostic Studies: No relevant studies.    EKG:   Results for orders placed or performed during the hospital encounter of 03/04/20   ECG 12 lead    Collection Time: 03/04/20  1:31 AM    Narrative    Test Reason : I63.9,    Vent. Rate : 063 BPM     Atrial Rate : 063 BPM     P-R Int : 204 ms          QRS Dur : 080 ms      QT Int : 436 ms       P-R-T Axes : 096 245 059 degrees     QTc Int : 446 ms      Normal sinus rhythm  Normal ECG  When compared with ECG of 04-DEC-2019 12:26,  No significant change was found  Confirmed by Donny Rodrigues MD ([id number]) on 3/4/2020 2:29:59 PM    Referred By: [email or web address]   SELF           Confirmed By:Donny Rodrigues MD       ECHOCARDIOGRAM:  TTE:  Results for orders placed or performed during the hospital encounter of 07/05/19   Transthoracic echo (TTE) 2D with Color Flow   Result Value Ref Range    Ascending aorta 2.86 cm    STJ 2.54 cm    AV mean gradient 4 mmHg    Ao peak gio 1.36 m/s    Ao VTI 19.79 cm    IVRT 0.16 msec    IVS 0.93 0.6 - 1.1 cm    LA size 4.61 cm    Left Atrium Major Axis 6.16 cm    Left Atrium Minor Axis 6.20 cm    LVIDD 4.12 3.5 - 6.0 cm    LVIDS 2.88 2.1 - 4.0 cm    LVOT diameter 2.06 cm    LVOT peak VTI 18.91 cm    PW 0.93 0.6 - 1.1 cm    MV Peak A Gio 1.09 m/s    E wave decelartion time 249.06 msec    MV Peak E  Gio 0.73 m/s    PV Peak D Gio 0.32 m/s    PV Peak S Gio 0.48 m/s    RA Major Axis 4.62 cm    RA Width 3.87 cm    RVDD 3.24 cm    Sinus 2.86 cm    TAPSE 1.56 cm    TR Max Gio 2.99 m/s    TDI LATERAL 0.05 m/s    TDI SEPTAL 0.05 m/s    LA WIDTH 4.53 cm    LV Diastolic Volume 75.14 mL    LV Systolic Volume 31.77 mL    RV S' 15.21 cm/s    LVOT peak gio 1.01 m/s    LV LATERAL E/E' RATIO 14.60 m/s    LV SEPTAL E/E' RATIO 14.60 m/s    FS 30 %    LA volume 109.70 cm3    LV mass 120.34 g    Left Ventricle Relative Wall Thickness 0.45 cm    AV valve area 3.18 cm2    AV Velocity Ratio 0.74     AV index (prosthetic) 0.96     E/A ratio 0.67     Mean e' 0.05 m/s    Pulm vein S/D ratio 1.50     LVOT area 3.3 cm2    LVOT stroke volume 62.99 cm3    AV peak gradient 7 mmHg    E/E' ratio 14.60 m/s    LV Systolic Volume Index 15.8 mL/m2    LV Diastolic Volume Index 37.31 mL/m2    LA Volume Index 54.5 mL/m2    LV Mass Index 60 g/m2    Triscuspid Valve Regurgitation Peak Gradient 36 mmHg    BSA 2.11 m2    Right Atrial Pressure (from IVC) 3 mmHg    TV rest pulmonary artery pressure 39 mmHg    Narrative    · Normal left ventricular systolic function. The estimated ejection   fraction is 68%  · Concentric left ventricular remodeling.  · Grade I (mild) left ventricular diastolic dysfunction consistent with   impaired relaxation.  · Severe left atrial enlargement.  · Mild mitral regurgitation.  · Normal central venous pressure (3 mm Hg).  · The estimated PA systolic pressure is 39 mm Hg  · Trivial Pericardial effusion.        STRESS:  Results for orders placed or performed during the hospital encounter of 09/05/19   Echocardiogram stress test   Result Value Ref Range    TDI SEPTAL 0.05 m/s    LV LATERAL E/E' RATIO 7.43 m/s    LV SEPTAL E/E' RATIO 10.40 m/s    LA WIDTH 4.33 cm    Systolic blood pressure 123 mmHg    Diastolic blood pressure 78 mmHg    HR at rest 83 bpm    RPP 10,209     Peak  bpm    Peak Systolic  mmHg    Peak  Diatolic BP 70 mmHg    Peak RPP 15,946     Max Predicted      85% Max Predicted      % Max HR Achieved 88     1 Minute Recovery  bpm    OHS CV CPX PATIENT IS MALE 0     OHS CV CPX PATIENT IS FEMALE 1     TDI LATERAL 0.07 m/s    LVIDD 3.49 (A) 3.5 - 6.0 cm    IVS 1.35 (A) 0.6 - 1.1 cm    PW 0.91 0.6 - 1.1 cm    LVIDS 2.28 2.1 - 4.0 cm    FS 35 28 - 44 %    LA volume 89.46 cm3    Sinus 3.11 cm    STJ 2.67 cm    Ascending aorta 3.30 cm    LV mass 123.40 g    LA size 4.30 cm    RVDD 3.22 cm    Left Ventricle Relative Wall Thickness 0.52 cm    E/A ratio 0.56     Mean e' 0.06 m/s    E wave decelartion time 209.83 msec    LVOT diameter 2.11 cm    LVOT area 3.5 cm2    LVOT peak gio 1.52 m/s    LVOT peak VTI 26.96 cm    LVOT stroke volume 94.22 cm3    E/E' ratio 8.67 m/s    MV Peak E Gio 0.52 m/s    MV Peak A Gio 0.93 m/s    LV Systolic Volume 17.82 mL    LV Diastolic Volume 50.64 mL    RA Major Axis 5.16 cm    Left Atrium Minor Axis 5.77 cm    Left Atrium Major Axis 5.54 cm    RA Width 2.85 cm    BSA 1.91 m2    LV Systolic Volume Index 9.6 mL/m2    LV Diastolic Volume Index 27.32 mL/m2    LA Volume Index 48.3 mL/m2    LV Mass Index 67 g/m2    Narrative    · The stress echo portion of this study is negative for myocardial   ischemia.  · The EKG portion of this study is negative for myocardial ischemia.  · Arrhythmias during stress: rare PVCs,  · Normal left ventricular systolic function. The estimated ejection   fraction is 65%  · Concentric left ventricular remodeling.  · Indeterminate left ventricular diastolic function.  · No wall motion abnormalities.  · Severe left atrial enlargement.  · Normal right ventricular systolic function.  · Trivial circumferential pericardial effusion.          ASSESSMENT/PLAN:         Anesthesia Evaluation    I have reviewed the Patient Summary Reports.    I have reviewed the Nursing Notes. I have reviewed the NPO Status.      Review of Systems  Anesthesia Hx:  No problems  with previous Anesthesia Hx of Anesthetic complications  History of prior surgery of interest to airway management or planning: Personal Hx of Anesthesia complications Slow To Awaken/Delayed Emergence   Social:  Non-Smoker, No Alcohol Use    Hematology/Oncology:  Hematology Normal   Oncology Normal     EENT/Dental:EENT/Dental Normal   Cardiovascular:   Hypertension    Pulmonary:   Asthma Sleep Apnea On 2L O2 baseline   Renal/:   Chronic Renal Disease, ESRD, Dialysis    Hepatic/GI:  Hepatic/GI Normal    Musculoskeletal:   Arthritis     Neurological:   CVA Neuromuscular Disease, Seizures    Endocrine:   Diabetes    Dermatological:  Skin Normal    Psych:   Psychiatric History          Physical Exam  General:  Well nourished, Obesity    Airway/Jaw/Neck:  Airway Findings: Mouth Opening: Normal Tongue: Normal  General Airway Assessment: Adult  Mallampati: III  Improves to II with phonation.  TM Distance: Normal, at least 6 cm        Eyes/Ears/Nose:  EYES/EARS/NOSE FINDINGS: Normal   Dental:  Dental Findings: In tact   Chest/Lungs:  Chest/Lungs Clear    Heart/Vascular:  Heart Findings: Normal Heart murmur: negative Vascular Findings: Normal    Abdomen:  Abdomen Findings: Normal    Musculoskeletal:  Musculoskeletal Findings: Normal   Skin:  Skin Findings: Normal    Mental Status:  Mental Status Findings: Normal        Anesthesia Plan  Type of Anesthesia, risks & benefits discussed:  Anesthesia Type:  general  Patient's Preference: Proceed with anesthesia understanding that the risks are very small but could be serious or life threatening.  Intra-op Monitoring Plan: standard ASA monitors  Intra-op Monitoring Plan Comments:   Post Op Pain Control Plan: per primary service following discharge from PACU, IV/PO Opioids PRN and multimodal analgesia  Post Op Pain Control Plan Comments:   Induction:   IV  Beta Blocker:  Patient is not currently on a Beta-Blocker (No further documentation required).       Informed Consent: Patient  understands risks and agrees with Anesthesia plan.  Questions answered. Anesthesia consent signed with patient.  ASA Score: 4     Day of Surgery Review of History & Physical: I have interviewed and examined the patient. I have reviewed the patient's H&P dated:    H&P update referred to the surgeon.     Anesthesia Plan Notes: Avoid right upper extremity with IV and NIBP.  Avoid Anectine, minimize Yinka, minimize Versed.        Ready For Surgery From Anesthesia Perspective.

## 2020-06-08 ENCOUNTER — ANESTHESIA (OUTPATIENT)
Dept: SURGERY | Facility: HOSPITAL | Age: 60
End: 2020-06-08
Payer: MEDICARE

## 2020-06-08 ENCOUNTER — HOSPITAL ENCOUNTER (OUTPATIENT)
Facility: HOSPITAL | Age: 60
Discharge: HOME | End: 2020-06-08
Attending: SURGERY | Admitting: SURGERY
Payer: MEDICARE

## 2020-06-08 ENCOUNTER — TELEPHONE (OUTPATIENT)
Dept: SURGERY | Facility: CLINIC | Age: 60
End: 2020-06-08

## 2020-06-08 VITALS
WEIGHT: 198.44 LBS | DIASTOLIC BLOOD PRESSURE: 66 MMHG | OXYGEN SATURATION: 97 % | HEIGHT: 63 IN | HEART RATE: 78 BPM | BODY MASS INDEX: 35.16 KG/M2 | RESPIRATION RATE: 18 BRPM | SYSTOLIC BLOOD PRESSURE: 150 MMHG | TEMPERATURE: 98 F

## 2020-06-08 DIAGNOSIS — N18.6 ESRD (END STAGE RENAL DISEASE): Primary | ICD-10-CM

## 2020-06-08 LAB
POCT GLUCOSE: 216 MG/DL (ref 70–110)
POCT GLUCOSE: 227 MG/DL (ref 70–110)
POCT GLUCOSE: 231 MG/DL (ref 70–110)

## 2020-06-08 PROCEDURE — 36000708 HC OR TIME LEV III 1ST 15 MIN: Mod: HCNC | Performed by: SURGERY

## 2020-06-08 PROCEDURE — 27201037 HC PRESSURE MONITORING SET UP: Mod: HCNC

## 2020-06-08 PROCEDURE — 25000003 PHARM REV CODE 250: Mod: HCNC | Performed by: SURGERY

## 2020-06-08 PROCEDURE — 27201423 OPTIME MED/SURG SUP & DEVICES STERILE SUPPLY: Mod: HCNC | Performed by: SURGERY

## 2020-06-08 PROCEDURE — 71000016 HC POSTOP RECOV ADDL HR: Mod: HCNC | Performed by: SURGERY

## 2020-06-08 PROCEDURE — 25000003 PHARM REV CODE 250: Mod: HCNC | Performed by: STUDENT IN AN ORGANIZED HEALTH CARE EDUCATION/TRAINING PROGRAM

## 2020-06-08 PROCEDURE — 71000015 HC POSTOP RECOV 1ST HR: Mod: HCNC | Performed by: SURGERY

## 2020-06-08 PROCEDURE — 37000009 HC ANESTHESIA EA ADD 15 MINS: Mod: HCNC | Performed by: SURGERY

## 2020-06-08 PROCEDURE — 27000221 HC OXYGEN, UP TO 24 HOURS: Mod: HCNC

## 2020-06-08 PROCEDURE — 82962 GLUCOSE BLOOD TEST: CPT | Mod: HCNC,91 | Performed by: SURGERY

## 2020-06-08 PROCEDURE — 63600175 PHARM REV CODE 636 W HCPCS: Mod: HCNC | Performed by: STUDENT IN AN ORGANIZED HEALTH CARE EDUCATION/TRAINING PROGRAM

## 2020-06-08 PROCEDURE — 63600175 PHARM REV CODE 636 W HCPCS: Mod: HCNC | Performed by: ANESTHESIOLOGY

## 2020-06-08 PROCEDURE — D9220A PRA ANESTHESIA: ICD-10-PCS | Mod: HCNC,,, | Performed by: ANESTHESIOLOGY

## 2020-06-08 PROCEDURE — D9220A PRA ANESTHESIA: Mod: HCNC,,, | Performed by: ANESTHESIOLOGY

## 2020-06-08 PROCEDURE — 37000008 HC ANESTHESIA 1ST 15 MINUTES: Mod: HCNC | Performed by: SURGERY

## 2020-06-08 PROCEDURE — 82962 GLUCOSE BLOOD TEST: CPT | Mod: HCNC | Performed by: SURGERY

## 2020-06-08 PROCEDURE — 49325 PR LAP REVISE INTRAPERITONEAL CATHETER: ICD-10-PCS | Mod: HCNC,,, | Performed by: SURGERY

## 2020-06-08 PROCEDURE — 49325 LAP REVISION PERM IP CATH: CPT | Mod: HCNC,,, | Performed by: SURGERY

## 2020-06-08 PROCEDURE — 71000044 HC DOSC ROUTINE RECOVERY FIRST HOUR: Mod: HCNC | Performed by: SURGERY

## 2020-06-08 PROCEDURE — 36000709 HC OR TIME LEV III EA ADD 15 MIN: Mod: HCNC | Performed by: SURGERY

## 2020-06-08 RX ORDER — ACETAMINOPHEN 10 MG/ML
INJECTION, SOLUTION INTRAVENOUS
Status: DISCONTINUED | OUTPATIENT
Start: 2020-06-08 | End: 2020-06-08

## 2020-06-08 RX ORDER — OXYCODONE HYDROCHLORIDE 5 MG/1
5 CAPSULE ORAL EVERY 6 HOURS PRN
Qty: 15 CAPSULE | Refills: 0 | Status: SHIPPED | OUTPATIENT
Start: 2020-06-08

## 2020-06-08 RX ORDER — FENTANYL CITRATE 50 UG/ML
INJECTION, SOLUTION INTRAMUSCULAR; INTRAVENOUS
Status: DISCONTINUED | OUTPATIENT
Start: 2020-06-08 | End: 2020-06-08

## 2020-06-08 RX ORDER — LIDOCAINE HCL/PF 100 MG/5ML
SYRINGE (ML) INTRAVENOUS
Status: DISCONTINUED | OUTPATIENT
Start: 2020-06-08 | End: 2020-06-08

## 2020-06-08 RX ORDER — GLYCOPYRROLATE 0.2 MG/ML
INJECTION INTRAMUSCULAR; INTRAVENOUS
Status: DISCONTINUED | OUTPATIENT
Start: 2020-06-08 | End: 2020-06-08

## 2020-06-08 RX ORDER — DEXAMETHASONE SODIUM PHOSPHATE 4 MG/ML
INJECTION, SOLUTION INTRA-ARTICULAR; INTRALESIONAL; INTRAMUSCULAR; INTRAVENOUS; SOFT TISSUE
Status: DISCONTINUED | OUTPATIENT
Start: 2020-06-08 | End: 2020-06-08

## 2020-06-08 RX ORDER — ROCURONIUM BROMIDE 10 MG/ML
INJECTION, SOLUTION INTRAVENOUS
Status: DISCONTINUED | OUTPATIENT
Start: 2020-06-08 | End: 2020-06-08

## 2020-06-08 RX ORDER — CISATRACURIUM BESYLATE 2 MG/ML
0.03 INJECTION, SOLUTION INTRAVENOUS
Status: DISCONTINUED | OUTPATIENT
Start: 2020-06-08 | End: 2020-06-08

## 2020-06-08 RX ORDER — PHENYLEPHRINE HYDROCHLORIDE 10 MG/ML
INJECTION INTRAVENOUS
Status: DISCONTINUED | OUTPATIENT
Start: 2020-06-08 | End: 2020-06-08

## 2020-06-08 RX ORDER — FENTANYL CITRATE 50 UG/ML
25 INJECTION, SOLUTION INTRAMUSCULAR; INTRAVENOUS EVERY 5 MIN PRN
Status: DISCONTINUED | OUTPATIENT
Start: 2020-06-08 | End: 2020-06-08 | Stop reason: HOSPADM

## 2020-06-08 RX ORDER — SODIUM CHLORIDE 9 MG/ML
INJECTION, SOLUTION INTRAVENOUS CONTINUOUS
Status: DISCONTINUED | OUTPATIENT
Start: 2020-06-08 | End: 2020-06-08 | Stop reason: HOSPADM

## 2020-06-08 RX ORDER — BUPIVACAINE HYDROCHLORIDE 2.5 MG/ML
INJECTION, SOLUTION EPIDURAL; INFILTRATION; INTRACAUDAL
Status: DISCONTINUED | OUTPATIENT
Start: 2020-06-08 | End: 2020-06-08 | Stop reason: HOSPADM

## 2020-06-08 RX ORDER — NEOSTIGMINE METHYLSULFATE 1 MG/ML
INJECTION, SOLUTION INTRAVENOUS
Status: DISCONTINUED | OUTPATIENT
Start: 2020-06-08 | End: 2020-06-08

## 2020-06-08 RX ORDER — SODIUM CHLORIDE 0.9 % (FLUSH) 0.9 %
3 SYRINGE (ML) INJECTION
Status: DISCONTINUED | OUTPATIENT
Start: 2020-06-08 | End: 2020-06-08 | Stop reason: HOSPADM

## 2020-06-08 RX ORDER — PROPOFOL 10 MG/ML
VIAL (ML) INTRAVENOUS
Status: DISCONTINUED | OUTPATIENT
Start: 2020-06-08 | End: 2020-06-08

## 2020-06-08 RX ORDER — LIDOCAINE HYDROCHLORIDE 10 MG/ML
1 INJECTION, SOLUTION EPIDURAL; INFILTRATION; INTRACAUDAL; PERINEURAL ONCE
Status: DISCONTINUED | OUTPATIENT
Start: 2020-06-08 | End: 2020-06-08 | Stop reason: HOSPADM

## 2020-06-08 RX ORDER — CEFAZOLIN SODIUM 1 G/3ML
2 INJECTION, POWDER, FOR SOLUTION INTRAMUSCULAR; INTRAVENOUS
Status: COMPLETED | OUTPATIENT
Start: 2020-06-08 | End: 2020-06-08

## 2020-06-08 RX ORDER — ONDANSETRON 2 MG/ML
INJECTION INTRAMUSCULAR; INTRAVENOUS
Status: DISCONTINUED | OUTPATIENT
Start: 2020-06-08 | End: 2020-06-08

## 2020-06-08 RX ADMIN — SODIUM CHLORIDE: 0.9 INJECTION, SOLUTION INTRAVENOUS at 07:06

## 2020-06-08 RX ADMIN — CEFAZOLIN 2 G: 330 INJECTION, POWDER, FOR SOLUTION INTRAMUSCULAR; INTRAVENOUS at 08:06

## 2020-06-08 RX ADMIN — PROPOFOL 100 MG: 10 INJECTION, EMULSION INTRAVENOUS at 07:06

## 2020-06-08 RX ADMIN — PHENYLEPHRINE HYDROCHLORIDE 100 MCG: 10 INJECTION INTRAVENOUS at 08:06

## 2020-06-08 RX ADMIN — FENTANYL CITRATE 50 MCG: 50 INJECTION, SOLUTION INTRAMUSCULAR; INTRAVENOUS at 07:06

## 2020-06-08 RX ADMIN — ONDANSETRON 4 MG: 2 INJECTION, SOLUTION INTRAMUSCULAR; INTRAVENOUS at 08:06

## 2020-06-08 RX ADMIN — GLYCOPYRROLATE 0.6 MG: 0.2 INJECTION, SOLUTION INTRAMUSCULAR; INTRAVENOUS at 08:06

## 2020-06-08 RX ADMIN — PROPOFOL 30 MG: 10 INJECTION, EMULSION INTRAVENOUS at 07:06

## 2020-06-08 RX ADMIN — ACETAMINOPHEN 1000 MG: 10 INJECTION, SOLUTION INTRAVENOUS at 08:06

## 2020-06-08 RX ADMIN — NEOSTIGMINE METHYLSULFATE 5 MG: 1 INJECTION INTRAVENOUS at 08:06

## 2020-06-08 RX ADMIN — DEXAMETHASONE SODIUM PHOSPHATE 4 MG: 4 INJECTION, SOLUTION INTRAMUSCULAR; INTRAVENOUS at 08:06

## 2020-06-08 RX ADMIN — INSULIN HUMAN 4 UNITS: 100 INJECTION, SOLUTION PARENTERAL at 10:06

## 2020-06-08 RX ADMIN — ROCURONIUM BROMIDE 30 MG: 10 INJECTION, SOLUTION INTRAVENOUS at 07:06

## 2020-06-08 RX ADMIN — FENTANYL CITRATE 25 MCG: 50 INJECTION, SOLUTION INTRAMUSCULAR; INTRAVENOUS at 08:06

## 2020-06-08 RX ADMIN — LIDOCAINE HYDROCHLORIDE 100 MG: 20 INJECTION, SOLUTION INTRAVENOUS at 07:06

## 2020-06-08 RX ADMIN — CISATRACURIUM BESYLATE 6 MG: 2 INJECTION INTRAVENOUS at 08:06

## 2020-06-08 NOTE — PLAN OF CARE
Patient prepped for surgery.  IV placed by anesthesia MD with U/S.  Notified all MDs that unable to obtain BMP; sending tube and req to surgery

## 2020-06-08 NOTE — ANESTHESIA PROCEDURE NOTES
Intubation  Performed by: Pola Dobbs MD  Authorized by: Ricardo Schofield MD     Intubation:     Induction:  Intravenous    Intubated:  Postinduction    Mask Ventilation:  Easy with oral airway    Attempts:  2    Attempted By:  Resident anesthesiologist    Method of Intubation:  Direct    Blade:  Sorto 2    Laryngeal View Grade: Grade IIb - only the arytenoids and epiglottis seen      Attempted By (2nd Attempt):  Staff anesthesiologist    Method of Intubation (2nd Attempt):  Direct    Blade (2nd Attempt):  Sorto 2    Laryngeal View Grade (2nd Attempt): Grade I - full view of cords      Difficult Airway Encountered?: No      Complications:  Soft tissue trauma (Upper lip knicked)    Airway Device:  Oral endotracheal tube    Airway Device Size:  7.0    Style/Cuff Inflation:  Cuffed    Inflation Amount (mL):  8    Tube secured:  22    Secured at:  The lips    Placement Verified By:  Capnometry    Complicating Factors:  Obesity    Findings Post-Intubation:  BS equal bilateral

## 2020-06-08 NOTE — ANESTHESIA POSTPROCEDURE EVALUATION
Anesthesia Post Evaluation    Patient: Lucy Perez    Procedure(s) Performed: Procedure(s) (LRB):  LAPAROSCOPY, DIAGNOSTIC (POSSIBLE PD CATH REVISION) (N/A)  REVISION OF PROCEDURE INVOLVING PERITONEAL DIALYSIS CATHETER, LAPAROSCOPIC (N/A)    Final Anesthesia Type: general    Patient location during evaluation: PACU  Patient participation: Yes- Able to Participate  Level of consciousness: awake and alert and oriented  Post-procedure vital signs: reviewed and stable  Pain management: adequate  Airway patency: patent    PONV status at discharge: No PONV  Anesthetic complications: no      Cardiovascular status: blood pressure returned to baseline and hemodynamically stable  Respiratory status: unassisted and room air  Hydration status: euvolemic  Follow-up not needed.          Vitals Value Taken Time   /66 6/8/2020 11:00 AM   Temp 36.8 °C (98.3 °F) 6/8/2020 11:00 AM   Pulse 78 6/8/2020 11:00 AM   Resp 18 6/8/2020 11:00 AM   SpO2 97 % 6/8/2020 11:00 AM         No case tracking events are documented in the log.      Pain/Samia Score: Samia Score: 10 (6/8/2020  9:45 AM)

## 2020-06-08 NOTE — BRIEF OP NOTE
[facility]  Brief Operative Note    Surgery Date: 6/8/2020     Surgeon(s) and Role:     * Paige Monsalve MD - Primary     * Corwin Villasenor MD - Resident - Assisting        Pre-op Diagnosis:  ESRD (end stage renal disease) [N18.6]    Post-op Diagnosis:  Post-Op Diagnosis Codes:     * ESRD (end stage renal disease) [N18.6]    Procedure(s) (LRB):  LAPAROSCOPY, DIAGNOSTIC (POSSIBLE PD CATH REVISION) (N/A)  REVISION OF PROCEDURE INVOLVING PERITONEAL DIALYSIS CATHETER, LAPAROSCOPIC (N/A)    Anesthesia: General    Description of the findings of the procedure(s): Peritoneal dialysis catheter with fibrinous clot occluding lumen.  Adhesive small bowel to anterior abdominal wall (did not reduce).    Estimated Blood Loss: Minimal         Specimens:   Specimen (12h ago, onward)    None            Discharge Note    OUTCOME: Patient tolerated treatment/procedure well without complication and is now ready for discharge.    DISPOSITION: Home or Self Care    FINAL DIAGNOSIS:  ESRD (end stage renal disease)    FOLLOWUP: In clinic   Follow-up Information     Paige Monsalve MD In 2 weeks.    Specialties:  General Surgery, Bariatrics  Why:  For wound re-check, post op follow up.  Contact information:  [address] [place] [id number] [phone]                   DISCHARGE INSTRUCTIONS:    Discharge Procedure Orders   Diet Adult Regular     Lifting restrictions   Order Comments: Don't lift more than 10lbs for at least 6 weeks.     No driving until:   Order Comments: No driving while using narcotics.     Notify your health care provider if you experience any of the following:  temperature >100.4     Notify your health care provider if you experience any of the following:  persistent nausea and vomiting or diarrhea     Notify your health care provider if you experience any of the following:  severe uncontrolled pain     Notify your health care provider if you experience any of the  following:  redness, tenderness, or signs of infection (pain, swelling, redness, odor or green/yellow discharge around incision site)     Notify your health care provider if you experience any of the following:  difficulty breathing or increased cough     Notify your health care provider if you experience any of the following:  severe persistent headache     Notify your health care provider if you experience any of the following:  worsening rash     Notify your health care provider if you experience any of the following:  persistent dizziness, light-headedness, or visual disturbances     Notify your health care provider if you experience any of the following:  increased confusion or weakness     No dressing needed   Order Comments: Use standard dressing for PD catheter exit site.     Other restrictions (specify):   Order Comments: Do NOT use peritoneal dialysis catheter for dialysis for at least 2 weeks.      DO START flushing peritoneal dialysis catheter by dialysis nurses when going for hemodialysis appointments to help prevent re-clotting of the catheter line.    May restart plavix tomorrow, do not take plavix today June 8th.     Activity as tolerated     Shower on day dressing removed (No bath)   Order Comments: May shower in 48 hours.  Do not soak incision in bath, pool, lake, etc with PD catheter.        Medication List      START taking these medications    oxyCODONE 5 mg Cap  Commonly known as:  OXY-IR  Take 1 capsule (5 mg total) by mouth every 6 (six) hours as needed for Pain.        CHANGE how you take these medications    dantrolene 50 MG Cap  Commonly known as:  DANTRIUM  Take one capsule three time daily for 1 week, then increase two tablet  What changed:    · how much to take  · how to take this  · when to take this     insulin glargine 100 units/mL (3mL) SubQ pen  Commonly known as:  LANTUS SOLOSTAR U-100 INSULIN  Inject 12 units at night.  What changed:    · how much to take  · how to take  "this  · when to take this  · additional instructions     * levETIRAcetam 1000 MG tablet  Commonly known as:  KEPPRA  Take 1 tablet (1,000 mg total) by mouth once daily.  What changed:  additional instructions     * levETIRAcetam 500 MG Tab  Commonly known as:  KEPPRA  Take 1 tablet (500 mg total) by mouth every Mon, Wed, Fri. Monday Wednesday and Friday after DIALYSIS take additional 500mg  What changed:    · when to take this  · additional instructions         * This list has 2 medication(s) that are the same as other medications prescribed for you. Read the directions carefully, and ask your doctor or other care provider to review them with you.            CONTINUE taking these medications    acetaminophen 325 MG tablet  Commonly known as:  TYLENOL  Take 2 tablets (650 mg total) by mouth every 6 (six) hours as needed for Pain.     albuterol 90 mcg/actuation inhaler  Commonly known as:  VENTOLIN HFA  Inhale 2 puffs into the lungs every 6 (six) hours as needed for Wheezing. Rescue     aspirin 81 MG EC tablet  Commonly known as:  ECOTRIN     atorvastatin 40 MG tablet  Commonly known as:  LIPITOR  TAKE 1 TABLET ONE TIME DAILY FOR CHOLESTEROL     * BD ULTRA-FINE SHORT PEN NEEDLE 31 gauge x 5/16" Ndle  Generic drug:  pen needle, diabetic  USE TO INJECT NOVOLOG FLEXPEN BEFORE MEALS     * pen needle, diabetic 32 gauge x 5/32" Ndle  Commonly known as:  BD EMI 2ND GEN PEN NEEDLE  Uses once a day, 90 day     bisacodyL 10 mg Supp  Commonly known as:  DULCOLAX  Place 1 suppository (10 mg total) rectally daily as needed.     blood sugar diagnostic Strp  To check BG 4  times daily, to use with insurance preferred meter-true metrix     blood-glucose meter kit  To check BG 4 times daily, to use with insurance preferred meter-true metrix     clopidogreL 75 mg tablet  Commonly known as:  PLAVIX  Take 1 tablet (75 mg total) by mouth once daily.     cyclobenzaprine 5 MG tablet  Commonly known as:  FLEXERIL  1 tab Q8 hours as needed " for back pain     diclofenac sodium 1 % Gel  Commonly known as:  VOLTAREN  Apply 2gm to toe or hip  2-3x/day as needed     ergocalciferol 50,000 unit Cap  Commonly known as:  ERGOCALCIFEROL  Take 1 capsule (50,000 Units total) by mouth every 7 days.     famotidine 20 MG tablet  Commonly known as:  PEPCID  Take 1 tablet (20 mg total) by mouth once daily.     FLUoxetine 20 MG capsule  Take 1 capsule (20 mg total) by mouth once daily.     folic acid 1 MG tablet  Commonly known as:  FOLVITE  Take 1 tablet (1,000 mcg total) by mouth once daily.     glipiZIDE 2.5 MG Tr24  Commonly known as:  GLUCOTROL  Take 1 tablet (hold if less than 150) at lunch (non dialysis days)     insulin aspart U-100 100 unit/mL injection  Commonly known as:  NovoLOG U-100 Insulin aspart  Use correction scale 180-230+1, 231-280+2, 281-330+3, 331-380+4, >380+5, max 15 units.     lidocaine 5 %  Commonly known as:  LIDODERM  Place 1 patch onto the skin once daily. Remove & Discard patch within 12 hours or as directed by MD     LORazepam 0.5 MG tablet  Commonly known as:  ATIVAN  1 tab 1 hour prior to Dialysis     ondansetron 4 MG Tbdl  Commonly known as:  ZOFRAN-ODT  Take 1 tablet (4 mg total) by mouth every 8 (eight) hours as needed.     polyethylene glycol 17 gram/dose powder  Commonly known as:  MIRALAX  Take 17 g by mouth 2 (two) times daily.     pregabalin 25 MG capsule  Commonly known as:  LYRICA  Take 1 capsule (25 mg total) by mouth once daily. May take additional dose after HD.     sevelamer carbonate 800 mg Tab  Commonly known as:  RENVELA     traMADoL 50 mg tablet  Commonly known as:  ULTRAM  Take 1 tablet (50 mg total) by mouth every 8 (eight) hours as needed for Pain. 1 tab every 8-12 hours as needed for pain     TRUEPLUS LANCETS 33 gauge Misc  Generic drug:  lancets         * This list has 2 medication(s) that are the same as other medications prescribed for you. Read the directions carefully, and ask your doctor or other care provider  to review them with you.               Where to Get Your Medications      These medications were sent to [facility]  [address] [facility] [address]    Hours:  Mon-Fri 7a-7p, Sat 10a-4p, Sun 10a-4p Phone:  [phone]   · oxyCODONE 5 mg Cap

## 2020-06-08 NOTE — TELEPHONE ENCOUNTER
R/t call to [facility] and informed that per Dr. Monsalve they can start flushing today, and they can flush every time they see her, but she does not want them to transition her to full PD for 2 weeks though. Understanding verbalized.    ----- Message from   Paige Monsalve MD sent at 6/8/2020  4:00 PM CDT -----  Contact: [facility] (Dialysis) [phone]  They can start flushing today, and they can flush every time they see her.    I don't want them to transition her to full PD for 2 weeks though. We also need to set her up with a 2-week post-op appt with me.     Thanks,  JKG    ----- Message -----  From: Marilynn Murray LPN  Sent: 6/8/2020   3:54 PM CDT  To: Paige Monsalve MD    [facility] wants to know how often they can flush the PD cath. They rec'd orders not to touch for the first 48 hrs and not to start dialysis for another 2 weeks. But they are concerned about clotting since she clots easily.    ----- Message -----  From: Milagro Coello  Sent: 6/8/2020   3:40 PM CDT  To: D[facility] Staff    [facility] called to speak to someone regarding a new catheter and flushing.

## 2020-06-08 NOTE — PROGRESS NOTES
Call to Dr. Schofield and report given on accu check re-check of 227. Pt. Daughter states she takes her glipizide at noon and that brings it down. Dr. Schofield said that was ok and pt. Ok to be dc'd home and take glizipide at noon. Pt. And family verb. Understanding.

## 2020-06-08 NOTE — H&P
Patient ID: Lucy Perez is a 59 y.o. female.    Chief Complaint: No chief complaint on file.    HPI  Mrs. Perez is a 59 year-old woman with ESRD currently on HD but requiring multiple revisions for whom a TeleHealth appt was made for post-op follow-up s/p laparoscopic peritoneal dialysis catheter placement on 3/9/20. Most of this appointment was done with her daughter however the patient was present and answered some questions too. She states that overall she's been doing well post-op and denies fevers, chills, nausea, vomiting, abdominal pain, pelvic pain or pressure, or tenesmus, and her bowels are moving well without constipation. Her incisions are healing well and her surgical site has been cleaned and evaluated by the dialysis staff and is without e/o infection. Today they were able to easily flush fluid however were unable to aspirate. The fluid is clear. Her HD went smoothly today.    Interval Hx: Pt continues to have issues with her PD catheter and presents today to undergo diagnostic lap with possible revision of her PD cath.    Review of Systems   All other systems reviewed and are negative.      Current Facility-Administered Medications   Medication Dose Route Frequency Provider Last Rate Last Dose    0.9%  NaCl infusion   Intravenous Continuous Yuri Terry MD        ceFAZolin injection 2 g  2 g Intravenous On Call Procedure Yuri Terry MD        lidocaine (PF) 10 mg/ml (1%) injection 10 mg  1 mL Intradermal Once Yuri Terry MD        sodium chloride 0.9% flush 3 mL  3 mL Intravenous PRN Pola Dobbs MD           Review of patient's allergies indicates:   Allergen Reactions    Lisinopril Other (See Comments)     Angioedema      Vicodin [hydrocodone-acetaminophen] Rash     No problem with acetaminophen        Past Medical History:   Diagnosis Date    Anemia of chronic disease 6/10/2017    Anxiety     Arthritis of right acromioclavicular joint  7/2/2014    Asthma     Bipolar disorder     Bronchitis, acute     Cataract     Cholelithiasis     Chronic diastolic CHF (congestive heart failure)     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    Cortical cataract of both eyes 7/26/2016    Decubitus ulcer of buttock, stage 2     Degeneration of lumbar or lumbosacral intervertebral disc 3/5/2013    S/p MRI L-spine 5/2009     Depression     Encounter for blood transfusion     ESRD on hemodialysis     Started August 2018    General anesthetics causing adverse effect in therapeutic use     Hemorrhagic cerebrovascular accident (CVA)     8[place] [facility] [place] hemip[place]is    History of stroke 6/28/2017    s/p R-MCA stroke with R-putaminal hemorrhagic transformation in 8/2016 and 11/2016 ([place] [facility] with residual L hemiparesis, on AED s/p CVA      Hypertensive retinopathy of both eyes 7/26/2016    Impingement syndrome of right shoulder 7/2/2014    Obesity     THERESA (obstructive sleep apnea) 3/5/2013    No Home CPAP 2ndary to cost     Partial symptomatic epilepsy with complex partial seizures, not intractable, without status epilepticus     Rheumatoid arthritis(714.0)     Rotator cuff tear 7/2/2014    Sarcoidosis     Stroke 2016    left sided flaccidity, SAH    Vertebral artery stenosis 3/5/2013    S/p Stenting per Dr Burnett        Past Surgical History:   Procedure Laterality Date    BREAST SURGERY      breast reduction    COLONOSCOPY N/A 8/11/2016    Procedure: COLONOSCOPY;  Surgeon: Jerry Vilchis MD;  Location: [facility] ENDO ([address] [place] [address]);  Service: Endoscopy;  Laterality: N/A;  Patient reports difficulty awaking from anesthesia in the past.    DECLOTTING OF VASCULAR GRAFT Right 6/20/2019    Procedure: DECLOT-GRAFT;  Surgeon: Cabrera Irwin MD;  Location: [facility] CATH LAB;  Service: Cardiology;  Laterality: Right;    DECLOTTING OF VASCULAR GRAFT Right 12/6/2019    Procedure:  "DECLOT-GRAFT;  Surgeon: Cabrera Irwin MD;  [facility];  Service: Peripheral Vascular;  Laterality: Right;    FISTULOGRAM Right 2/11/2019    Procedure: Fistulogram;  Surgeon: Meir Valencia MD;  Location: [facility] CATH LAB;  Service: Peripheral Vascular;  Laterality: Right;    FISTULOGRAM Right 7/8/2019    Procedure: Fistulogram;  Surgeon: WELLINGTON Palm III, MD;  Location: [facility] CATH LAB;  Service: Peripheral Vascular;  Laterality: Right;    FISTULOGRAM Right 12/6/2019    Procedure: Fistulogram;  Surgeon: Cabrera Irwin MD;  [facility];  Service: Peripheral Vascular;  Laterality: Right;    FISTULOGRAM Right 3/12/2020    Procedure: FISTULOGRAM;  Surgeon: Meir Valencia MD;  Location: [facility] CATH LAB;  Service: Peripheral Vascular;  Laterality: Right;    HYSTERECTOMY  1999    JOSE LUIS/BSO (AUB)    LAPAROSCOPIC LYSIS OF ADHESIONS N/A 3/9/2020    Procedure: LYSIS, ADHESIONS, LAPAROSCOPIC;  Surgeon: Paige Monsalve MD;  Location: [address];  Service: General;  Laterality: N/A;    PLACEMENT OF ARTERIOVENOUS GRAFT Right 10/18/2018    Procedure: AV GRAFT CREATION;  Surgeon: Meir Valencia MD;  Location: [address];  Service: Cardiovascular;  Laterality: Right;    ROTATOR CUFF REPAIR Right July 9, 2014    right side    Skull surgery      Aneurysm    stent placed      in vertebral artery    TOTAL REDUCTION MAMMOPLASTY      TUBAL LIGATION         Family History   Problem Relation Age of Onset    Diabetes Mother     Hypertension Mother     Heart disease Mother     Heart attack Mother     Breast cancer Mother     Stroke Sister     Hypertension Sister     Sleep apnea Sister     No Known Problems Daughter     Diabetes Son     Bell's palsy Sister     Lupus Sister     Blindness Maternal Grandmother     Diabetes Unknown         "My entire family family has diabetes"    Stroke Maternal Aunt     Colon cancer Neg Hx     Ovarian cancer Neg Hx  "       Social History     Socioeconomic History    Marital status:      Spouse name: Not on file    Number of children: Not on file    Years of education: Not on file    Highest education level: Not on file   Occupational History    Not on file   Social Needs    Financial resource strain: Somewhat hard    Food insecurity:     Worry: Never true     Inability: Never true    Transportation needs:     Medical: No     Non-medical: No   Tobacco Use    Smoking status: Never Smoker    Smokeless tobacco: Never Used   Substance and Sexual Activity    Alcohol use: Yes     Frequency: Monthly or less     Drinks per session: 1 or 2     Binge frequency: Never     Comment: occasional wine cooler     Drug use: Never    Sexual activity: Yes     Partners: Male     Birth control/protection: Post-menopausal   Lifestyle    Physical activity:     Days per week: 0 days     Minutes per session: 0 min    Stress: To some extent   Relationships    Social connections:     Talks on phone: More than three times a week     Gets together: Patient refused     Attends Rastafarian service: Not on file     Active member of club or organization: Yes     Attends meetings of clubs or organizations: More than 4 times per year     Relationship status:    Other Topics Concern    Not on file   Social History Narrative    . 2 children(Wilder and Reina). Does not work. Previously worked as a .        Vitals:    06/08/20 0613   BP: (!) 194/92   Pulse: 72   Resp: 20   Temp: 98 °F (36.7 °C)       Physical Exam   Constitutional: She is oriented to person, place, and time. She appears well-developed and well-nourished.   Cardiovascular: Normal rate and regular rhythm.   Pulmonary/Chest: Effort normal. No respiratory distress.   Abdominal: Soft. She exhibits no distension. There is no tenderness. There is no guarding.   Neurological: She is alert and oriented to person, place, and time.   Skin: Skin is warm and  dry.   Psychiatric: She has a normal mood and affect. Her behavior is normal. Judgment and thought content normal.   Vitals reviewed.      Assessment & Plan:    Patient is 58 yo  F who undergoes PD for ESRD who's catheter is malfunctioning    Will obtain consent  To OR today

## 2020-06-08 NOTE — DISCHARGE INSTRUCTIONS
Do NOT use peritoneal dialysis catheter for dialysis for at least 2 weeks.      DO START flushing peritoneal dialysis catheter by dialysis nurses when going for hemodialysis appointments to help prevent re-clotting of the catheter line.    May restart plavix tomorrow, do not take plavix today June 8th.

## 2020-06-08 NOTE — TRANSFER OF CARE
"Anesthesia Transfer of Care Note    Patient: Lucy Perez    Procedure(s) Performed: Procedure(s) (LRB):  LAPAROSCOPY, DIAGNOSTIC (POSSIBLE PD CATH REVISION) (N/A)  REVISION OF PROCEDURE INVOLVING PERITONEAL DIALYSIS CATHETER, LAPAROSCOPIC (N/A)    Patient location: PACU    Anesthesia Type: general    Transport from OR: Transported from OR on 6-10 L/min O2 by face mask with adequate spontaneous ventilation    Post pain: adequate analgesia    Post assessment: no apparent anesthetic complications    Post vital signs: stable    Level of consciousness: awake and alert    Nausea/Vomiting: no nausea/vomiting    Complications: none    Transfer of care protocol was followed      Last vitals:   Visit Vitals  BP (!) 194/92 (BP Location: Left arm, Patient Position: Lying)   Pulse 72   Temp 36.7 °C (98 °F) (Oral)   Resp 20   Ht 5' 3" (1.6 m)   Wt 90 kg (198 lb 6.6 oz)   LMP  (LMP Unknown)   SpO2 99%   Breastfeeding? No   BMI 35.15 kg/m²     "

## 2020-06-08 NOTE — OP NOTE
DATE OF PROCEDURE: 6/8/2020    PRE OP DIAGNOSIS: ESRD (end stage renal disease) [N18.6]  Non-functioning peritoneal dialysis catheter  Peritoneal dialysis catheter in place  Hemodialysis  Obesity  H/o CVA with residual L hemiparesis  Intra-abdominal adhesions from prior surgery    POST OP DIAGNOSIS: ESRD (end stage renal disease) [N18.6]  Non-functioning peritoneal dialysis catheter  Peritoneal dialysis catheter in place  Hemodialysis  Obesity  H/o CVA with residual L hemiparesis  Intra-abdominal adhesions from prior surgery    PROCEDURE: Procedure(s) (LRB):  LAPAROSCOPY, DIAGNOSTIC (N/A)  REVISION OF PROCEDURE INVOLVING PERITONEAL DIALYSIS CATHETER, LAPAROSCOPIC (N/A)    Surgeon(s) and Role:     * Paige Monsalve MD - Primary     * Corwin Villasenor MD - Resident - Assisting    ANESTHESIA: General    INDICATION FOR PROCEDURE: Patient is a 59 year-old woman with end-stage renal disease on HD and Plavix who underwent laparoscopic peritoneal dialysis catheter placement on 3/9/20. Since placement, she has been unable to successfully transition to PD given difficulty with aspiration and flushing. Abdominal X-ray demonstrated good catheter positioning in the mid-pelvis. After discussing the risks, benefits and alternatives to diagnostic laparoscopic with possible catheter revision vs removal pending findings, she elected to proceed.     FINDINGS:   1. Significant dense adhesions from omentum and small bowel to anterior abdominal wall; omentum partially taken down, small bowel left in place.  2. Adhesions in pelvis to catheter, divided.  3. Thick fibrinous clot within entire porous portion of PD catheter, removed.  4. Catheter flushed with ease following adhesiolysis and fibrin removal; hep locked.  5. Final placement of catheter again in mid-pelvis.     DESCRIPTION OF PROCEDURE: The patient was met in the pre-op area and her identity and consent confirmed. She was then brought to the Operating Room and placed in  the supine position. General anesthesia was induced and she was intubated without incident. SCDs and a warming blanket were placed and 2g cefazolin were infused. Her abdomen was prepped and draped in sterile fashion using Ioband and a pre-procedural pause was performed by all members of the surgical and anesthesia teams. Access to the abdomen was achieved with a Veress needle at Ignacio's point on first pass and the abdomen insufflated to 15 mmHg. A 5 mm incision was made at this LUQ location and the 5 mm Optiview trocar was used to enter into the peritoneum under direct vision. The abdomen was inspected to make sure no trauma occurred upon entry. Many omental and small bowel adhesions were again noted to the anterior abdominal wall from her prior surgeries. Two RUQ 5-mm ports were placed under visualization and some of these were taken down to allow for access to the abdomen and pelvis. Two loops of non-obstructed small bowel were very densely adherent to the abdominal wall and these were left in place to avoid injury. Inspection of the pelvis noted the catheter to be caught in filmy adhesions which were divided and the catheter freed. Fibrinous material was noted within the catheter for the entirety of its porous portion. This was completely removed by manually milking the contents and flushing 70 cc of sterile saline. The fibrin was removed from the abdomen and the catheter coil returned to the mid-pelvis. An additional 210 cc of sterile saline was instilled with ease and the catheter locked with heparinized saline. A new sterile iodinized cap was placed. The trocars were removed under visualization and the abdomen desufflated without incident. The skin incisions were closed with 4-0 Monocryl and reinforced with Dermabond. The catheter was covered with a sterile fluff dressing. The patient was awoken from anesthesia and extubated without complication. She was brought to the PACU in good condition having tolerated  the operation well. Surgical sponge and needle counts were correct at the end of the case.     EBL: 5 mL  Specimens: None  Complications: None apparent  Drains: PD catheter  Dispo: Home from PACU     I was present and scrubbed for the entirety of this operation.     Paige Monsalve  6/8/2020

## 2020-06-09 ENCOUNTER — TELEPHONE (OUTPATIENT)
Dept: VASCULAR SURGERY | Facility: CLINIC | Age: 60
End: 2020-06-09

## 2020-06-09 DIAGNOSIS — Z01.818 PRE-OP EVALUATION: Primary | ICD-10-CM

## 2020-06-09 NOTE — TELEPHONE ENCOUNTER
Received a call from [facility] dialysis nurse who stated the pt's graft is clotted.Last full dialysis treatment was 6/06/20.Spoke with pt who stated she only ate grapes and doesn't have an appetite due to procedure yesterday(Revision of PD catheter which can't be used for 2wks).Dr Buchanan on call and notified.Dr Martinez notified.

## 2020-06-09 NOTE — TELEPHONE ENCOUNTER
Spoke with pt's daughter Reina and informed her to report to the main campus on [facility] 3rd floor Cath Lab.Also informed her of NPO after midnight tonight and rapid  Covid test will be done in am.Reina verbalized understanding of information received.

## 2020-06-10 ENCOUNTER — HOSPITAL ENCOUNTER (OUTPATIENT)
Facility: HOSPITAL | Age: 60
Discharge: HOME | End: 2020-06-10
Attending: SURGERY | Admitting: SURGERY
Payer: MEDICARE

## 2020-06-10 VITALS
HEIGHT: 63 IN | DIASTOLIC BLOOD PRESSURE: 74 MMHG | TEMPERATURE: 97 F | OXYGEN SATURATION: 95 % | BODY MASS INDEX: 37.5 KG/M2 | HEART RATE: 79 BPM | SYSTOLIC BLOOD PRESSURE: 163 MMHG | WEIGHT: 211.63 LBS | RESPIRATION RATE: 18 BRPM

## 2020-06-10 DIAGNOSIS — Z01.818 PRE-OP EVALUATION: ICD-10-CM

## 2020-06-10 DIAGNOSIS — T82.590D AV GRAFT MALFUNCTION, SUBSEQUENT ENCOUNTER: Primary | ICD-10-CM

## 2020-06-10 LAB — SARS-COV-2 RDRP RESP QL NAA+PROBE: NEGATIVE

## 2020-06-10 PROCEDURE — 99152 PR MOD CONSCIOUS SEDATION, SAME PHYS, 5+ YRS, FIRST 15 MIN: ICD-10-PCS | Mod: HCNC,,, | Performed by: SURGERY

## 2020-06-10 PROCEDURE — 63600175 PHARM REV CODE 636 W HCPCS: Mod: HCNC | Performed by: SURGERY

## 2020-06-10 PROCEDURE — 36901 INTRO CATH DIALYSIS CIRCUIT: CPT | Mod: HCNC | Performed by: SURGERY

## 2020-06-10 PROCEDURE — 27201423 OPTIME MED/SURG SUP & DEVICES STERILE SUPPLY: Mod: HCNC | Performed by: SURGERY

## 2020-06-10 PROCEDURE — 25500020 PHARM REV CODE 255: Mod: HCNC | Performed by: SURGERY

## 2020-06-10 PROCEDURE — 99152 MOD SED SAME PHYS/QHP 5/>YRS: CPT | Mod: HCNC,,, | Performed by: SURGERY

## 2020-06-10 PROCEDURE — 82962 GLUCOSE BLOOD TEST: CPT | Mod: HCNC | Performed by: SURGERY

## 2020-06-10 PROCEDURE — 36907 PR TRANSLML BALLOON ANGIO, CTR DIALYSIS SEGMENT THRU DIALYSIS CIRCUIT: ICD-10-PCS | Mod: HCNC,,, | Performed by: SURGERY

## 2020-06-10 PROCEDURE — U0002 COVID-19 LAB TEST NON-CDC: HCPCS | Mod: HCNC

## 2020-06-10 PROCEDURE — 36901 PR INTRO CATH, DIALYSIS CIRCUIT: ICD-10-PCS | Mod: HCNC,,, | Performed by: SURGERY

## 2020-06-10 PROCEDURE — 36907 BALO ANGIOP CTR DIALYSIS SEG: CPT | Mod: HCNC,,, | Performed by: SURGERY

## 2020-06-10 PROCEDURE — C1725 CATH, TRANSLUMIN NON-LASER: HCPCS | Mod: HCNC | Performed by: SURGERY

## 2020-06-10 PROCEDURE — 37248 TRLUML BALO ANGIOP 1ST VEIN: CPT

## 2020-06-10 PROCEDURE — C1894 INTRO/SHEATH, NON-LASER: HCPCS | Mod: HCNC | Performed by: SURGERY

## 2020-06-10 PROCEDURE — 36901 INTRO CATH DIALYSIS CIRCUIT: CPT | Mod: HCNC,,, | Performed by: SURGERY

## 2020-06-10 PROCEDURE — 99152 MOD SED SAME PHYS/QHP 5/>YRS: CPT | Mod: HCNC | Performed by: SURGERY

## 2020-06-10 PROCEDURE — 25000003 PHARM REV CODE 250: Mod: HCNC | Performed by: SURGERY

## 2020-06-10 PROCEDURE — 36907 BALO ANGIOP CTR DIALYSIS SEG: CPT | Mod: HCNC | Performed by: SURGERY

## 2020-06-10 PROCEDURE — C1769 GUIDE WIRE: HCPCS | Mod: HCNC | Performed by: SURGERY

## 2020-06-10 RX ORDER — FENTANYL CITRATE 50 UG/ML
INJECTION, SOLUTION INTRAMUSCULAR; INTRAVENOUS
Status: DISCONTINUED | OUTPATIENT
Start: 2020-06-10 | End: 2020-06-10 | Stop reason: HOSPADM

## 2020-06-10 RX ORDER — HEPARIN SODIUM 1000 [USP'U]/ML
INJECTION, SOLUTION INTRAVENOUS; SUBCUTANEOUS
Status: DISCONTINUED | OUTPATIENT
Start: 2020-06-10 | End: 2020-06-10 | Stop reason: HOSPADM

## 2020-06-10 RX ORDER — MUPIROCIN 20 MG/G
OINTMENT TOPICAL
Status: DISCONTINUED | OUTPATIENT
Start: 2020-06-10 | End: 2020-06-10 | Stop reason: HOSPADM

## 2020-06-10 RX ORDER — LIDOCAINE HYDROCHLORIDE 20 MG/ML
INJECTION, SOLUTION INFILTRATION; PERINEURAL
Status: DISCONTINUED | OUTPATIENT
Start: 2020-06-10 | End: 2020-06-10 | Stop reason: HOSPADM

## 2020-06-10 RX ORDER — HEPARIN SODIUM 200 [USP'U]/100ML
INJECTION, SOLUTION INTRAVENOUS
Status: DISCONTINUED | OUTPATIENT
Start: 2020-06-10 | End: 2020-06-10 | Stop reason: HOSPADM

## 2020-06-10 RX ORDER — LIDOCAINE HYDROCHLORIDE 10 MG/ML
1 INJECTION, SOLUTION EPIDURAL; INFILTRATION; INTRACAUDAL; PERINEURAL ONCE
Status: DISCONTINUED | OUTPATIENT
Start: 2020-06-10 | End: 2020-06-10 | Stop reason: HOSPADM

## 2020-06-10 RX ORDER — MIDAZOLAM HYDROCHLORIDE 1 MG/ML
INJECTION, SOLUTION INTRAMUSCULAR; INTRAVENOUS
Status: DISCONTINUED | OUTPATIENT
Start: 2020-06-10 | End: 2020-06-10 | Stop reason: HOSPADM

## 2020-06-10 RX ORDER — SODIUM CHLORIDE 0.9 % (FLUSH) 0.9 %
10 SYRINGE (ML) INJECTION
Status: DISCONTINUED | OUTPATIENT
Start: 2020-06-10 | End: 2020-06-10 | Stop reason: HOSPADM

## 2020-06-10 RX ORDER — CEFAZOLIN SODIUM 1 G/3ML
2 INJECTION, POWDER, FOR SOLUTION INTRAMUSCULAR; INTRAVENOUS
Status: DISCONTINUED | OUTPATIENT
Start: 2020-06-10 | End: 2020-06-10 | Stop reason: HOSPADM

## 2020-06-10 NOTE — H&P
Vascular H&P     HPI  59 y F with ESRD, s/p lap PD catheter revision on 6/8, not yet ready for use. Currently dialyzing via R AVG. However noted earlier this week that it was thrombosed. Last HD was 6/6/20. Last fistulogram done 3/12/20: PTA arterial anastomosis with 5mm balloon, 8x60 mm life stent in AVG. Last declot done 12/6/19.        Review of Systems   All other systems reviewed and are negative.        Current Medications             Current Facility-Administered Medications   Medication Dose Route Frequency Provider Last Rate Last Dose    0.9%  NaCl infusion   Intravenous Continuous Yuri Terry MD        ceFAZolin injection 2 g  2 g Intravenous On Call Procedure Yuri Terry MD        lidocaine (PF) 10 mg/ml (1%) injection 10 mg  1 mL Intradermal Once Yuri Terry MD        sodium chloride 0.9% flush 3 mL  3 mL Intravenous PRN Pola Dobbs MD                      Review of patient's allergies indicates:   Allergen Reactions    Lisinopril Other (See Comments)       Angioedema       Vicodin [hydrocodone-acetaminophen] Rash       No problem with acetaminophen               Past Medical History:   Diagnosis Date    Anemia of chronic disease 6/10/2017    Anxiety      Arthritis of right acromioclavicular joint 7/2/2014    Asthma      Bipolar disorder      Bronchitis, acute      Cataract      Cholelithiasis      Chronic diastolic CHF (congestive heart failure)      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    Cortical cataract of both eyes 7/26/2016    Decubitus ulcer of buttock, stage 2      Degeneration of lumbar or lumbosacral intervertebral disc 3/5/2013     S/p MRI L-spine 5/2009     Depression      Encounter for blood transfusion      ESRD on hemodialysis       Started August 2018    General anesthetics causing adverse effect in therapeutic use      Hemorrhagic cerebrovascular accident (CVA)       8/2016 [place]  [place] [facility] [place] hemiparesis    History of stroke 6/28/2017     s/p R-MCA stroke with R-putaminal hemorrhagic transformation in 8/2016 and 11/2016 (s/p [place]micraniot[place] [facility]) with residual L hemiparesis, on AED s/p CVA      Hypertensive retinopathy of both eyes 7/26/2016    Impingement syndrome of right shoulder 7/2/2014    Obesity      THERESA (obstructive sleep apnea) 3/5/2013     No Home CPAP 2ndary to cost     Partial symptomatic epilepsy with complex partial seizures, not intractable, without status epilepticus      Rheumatoid arthritis(714.0)      Rotator cuff tear 7/2/2014    Sarcoidosis      Stroke 2016     left sided flaccidity, SAH    Vertebral artery stenosis 3/5/2013     S/p Stenting per Dr Burnett                Past Surgical History:   Procedure Laterality Date    BREAST SURGERY         breast reduction    COLONOSCOPY N/A 8/11/2016     Procedure: COLONOSCOPY;  Surgeon: Jerry Vilchis MD;  Location: [facility] (4TH FLR);  Service: Endoscopy;  Laterality: N/A;  Patient reports difficulty awaking from anesthesia in the past.    DECLOTTING OF VASCULAR GRAFT Right 6/20/2019     Procedure: DECLOT-GRAFT;  Surgeon: Cabrera Irwin MD;  Location: [facility] CATH LAB;  Service: Cardiology;  Laterality: Right;    DECLOTTING OF VASCULAR GRAFT Right 12/6/2019     Procedure: DECLOT-GRAFT;  Surgeon: Cabrera Irwin MD;  Location: [facility] OR 2ND FLR;  Service: Peripheral Vascular;  Laterality: Right;    FISTULOGRAM Right 2/11/2019     Procedure: Fistulogram;  Surgeon: Meir Valencia MD;  Location: [facility] CATH LAB;  Service: Peripheral Vascular;  Laterality: Right;    FISTULOGRAM Right 7/8/2019     Procedure: Fistulogram;  Surgeon: WELLINGTON Palm III, MD;  Location: [facility] CATH LAB;  Service: Peripheral Vascular;  Laterality: Right;    FISTULOGRAM Right 12/6/2019     Procedure: Fistulogram;  Surgeon: Cabrera Irwin MD;  Location: [facility] OR [facility] FLR;  Service: Peripheral Vascular;   "Laterality: Right;    FISTULOGRAM Right 3/12/2020     Procedure: FISTULOGRAM;  Surgeon: Meir Valencia MD;  Location: [facility] CATH LAB;  Service: Peripheral Vascular;  Laterality: Right;    HYSTERECTOMY   1999     JOSE LUIS/BSO (AUB)    LAPAROSCOPIC LYSIS OF ADHESIONS N/A 3/9/2020     Procedure: LYSIS, ADHESIONS, LAPAROSCOPIC;  Surgeon: Paige Monsalve MD;  [facility];  Service: General;  Laterality: N/A;    PLACEMENT OF ARTERIOVENOUS GRAFT Right 10/18/2018     Procedure: AV GRAFT CREATION;  Surgeon: Meir Valencia MD;  [facility];  Service: Cardiovascular;  Laterality: Right;    ROTATOR CUFF REPAIR Right July 9, 2014     right side    Skull surgery         Aneurysm    stent placed         in vertebral artery    TOTAL REDUCTION MAMMOPLASTY        TUBAL LIGATION                   Family History   Problem Relation Age of Onset    Diabetes Mother      Hypertension Mother      Heart disease Mother      Heart attack Mother      Breast cancer Mother      Stroke Sister      Hypertension Sister      Sleep apnea Sister      No Known Problems Daughter      Diabetes Son      Bell's palsy Sister      Lupus Sister      Blindness Maternal Grandmother      Diabetes Unknown           "My entire family family has diabetes"    Stroke Maternal Aunt      Colon cancer Neg Hx      Ovarian cancer Neg Hx           Social History               Socioeconomic History    Marital status:        Spouse name: Not on file    Number of children: Not on file    Years of education: Not on file    Highest education level: Not on file   Occupational History    Not on file   Social Needs    Financial resource strain: Somewhat hard    Food insecurity:       Worry: Never true       Inability: Never true    Transportation needs:       Medical: No       Non-medical: No   Tobacco Use    Smoking status: Never Smoker    Smokeless tobacco: Never Used   Substance and Sexual Activity "    Alcohol use: Yes       Frequency: Monthly or less       Drinks per session: 1 or 2       Binge frequency: Never       Comment: occasional wine cooler     Drug use: Never    Sexual activity: Yes       Partners: Male       Birth control/protection: Post-menopausal   Lifestyle    Physical activity:       Days per week: 0 days       Minutes per session: 0 min    Stress: To some extent   Relationships    Social connections:       Talks on phone: More than three times a week       Gets together: Patient refused       Attends Anabaptism service: Not on file       Active member of club or organization: Yes       Attends meetings of clubs or organizations: More than 4 times per year       Relationship status:    Other Topics Concern    Not on file   Social History Narrative     . 2 children(Wilder and Reina). Does not work. Previously worked as a .                 Vitals:     06/08/20 0613   BP: (!) 194/92   Pulse: 72   Resp: 20   Temp: 98 °F (36.7 °C)         Physical Exam   Constitutional: She is oriented to person, place, and time. She appears well-developed and well-nourished.   Cardiovascular: Normal rate and regular rhythm.   Pulmonary/Chest: Effort normal. No respiratory distress.   Abdominal: Soft. She exhibits no distension. There is no tenderness. There is no guarding.   Neurological: She is alert and oriented to person, place, and time.   Skin: Skin is warm and dry.   Psychiatric: She has a normal mood and affect. Her behavior is normal. Judgment and thought content normal.   Vitals reviewed.        Assessment & Plan:  59 y F with thrombsed R AVG. Last HD 6/6/20. PD cath not ready for use  - to cath lab today for perc thrombectomy  - covid screen pending    Lucy Roman MD   Fellow, Vascular/Endovascular Surgery

## 2020-06-10 NOTE — Clinical Note
Angiography performed of the ostial, proximal and middle Right AV Fistula . via hand injection with .

## 2020-06-10 NOTE — PROGRESS NOTES
Received report from cath lab RN. Patient s/p fistulogram, AAOx4. VSS, no c/o pain or discomfort at this time, resp even and unlabored. Gauze/tegaderm dressing to R fistula is CDI. No active bleeding. No hematoma noted. Post procedure protocol reviewed with patient and patient's family. Understanding verbalized. Family members at bedside. Nurse call bell within reach. Will continue to monitor per post procedure protocol.

## 2020-06-10 NOTE — PLAN OF CARE
Patient arrived to room. Unable to obatin IV, dr sheffield at bedside and notified. Admit assessment completed. Plan of care discussed with patient. Will monitor

## 2020-06-10 NOTE — OP NOTE
VASCULAR SURGERY OP NOTE    Date of Operation/Procedure: 6/10/20    Pre-operative Diagnosis: Stenosis of vascular graft initial (T82.858A)    Post-operative Diagnosis: same    Anesthesia: Moderate Sedation    Full Drape Used: Yes     Sedation time: 20 minutes  I was present for, and monitored the patients cardio respiratory functions during the moderate sedation. See nurses notes for Intra-service start and end times and for the log of the medications for the moderate sedation, including their dosage and route.      Operation/Procedure Performed:   1. Right arm fistulogram  2. Central venogram  3. Balloon angioplasty of right subclavian vein (8x60mm [place]; 96o03wf Trego)    Attending Surgeon: SERENA Buchanan II, MD    Resident/Fellow: Lucy Roman MD PGY7    Indications:   60yo F who was unable to dialyze via RUE AVG yesterday. Thrill could not be palpated in the graft and it was suspected to be thrombosed. She was consented for percutaneous AVG declot with fistulogram and possible intervention. Risks, benefits, and alternatives were explained to the patient who expressed full understanding and wished to proceed.    Procedure in Detail:   After informed consent was obtained the patient was taken to the operating room in supine position. Appropriate hemodynamic monitors were put in place by the nurse. The right arm was prepped and draped in normal sterile fashion. A time-out was performed to confirm the appropriate patient, procedure, and laterality.  An area over the graft was injected with 2% lidocaine for local anesthesia. We obtained percutaneous antegrade access the right arm AV graft using a micropuncture access kit. The micropuncture sheath was advanced over the wire and a fistulogram was performed through the sheath. Fluoroscopy showed that we had accessed through a stent. We performed a fistulogram through the micropuncture sheath. The inflow, graft, and outflow were widely patent. There was a  previously placed right subclavian vein stent which had 80% stenosis. The mircopuncture was removed and access site closed with a 4-0 monocryl. We then injected 2% lidocaine into the tissues overlying the graft in the upper arm. The graft was then accessed in the upper arm using ultrasound guidance and micropuncture access kit. Through a series of wire/sheath exchanges a 6F sheath was advanced into the graft over a stiff glidewire.  3000 units of heparin was administered. The glide wire was then advanced through the sheath and across the stenosis.  A 8x60mm [place] balloon was then advanced over the wire across the area of stenosis. This was inflated to nominal pressure but did not have adequate wall apposition with the stent. The balloon was deflated and exchanged for a 10x40 [place] balloon. The balloon was inflated for 2 minutes. A completion fistulogram showed improved stenosis with minimal collateral filling. The sheath was then removed and the entry point closed with a 4-0 moncryl. The patient tolerated the procedure well and was transported to the post-op area for recovery.    Estimated Blood loss: 20ml    Complications: none    SERENA Buchanan II, MD, RPVI  Vascular Surgeon  [facility]

## 2020-06-10 NOTE — BRIEF OP NOTE
[facility]  Brief Operative Note    Surgery Date: 6/10/2020     Surgeon(s) and Role:     * SERENA Buchanan II, MD - Primary     * Lucy Roman MD - Fellow    Assisting Surgeon: None    Pre-op Diagnosis:  Thrombosed R AVG    Post-op Diagnosis:  Central vein stenosis    Procedure:  R AV fistulogram  PTA subclavian stent (10x40 dor[place])    Anesthesia: RN IV Sedation    Description of the findings of the procedure(s): subclavian ISR 70%. No inflow stenosis.    Estimated Blood Loss:10 cc         Specimens:   Specimen (12h ago, onward)    None            Discharge Note    OUTCOME: Patient tolerated treatment/procedure well without complication and is now ready for discharge.    DISPOSITION: Home or Self Care    FINAL DIAGNOSIS:  <principal problem not specified>    FOLLOWUP: In clinic    DISCHARGE INSTRUCTIONS:    Discharge Procedure Orders   Diet general     Remove dressing in 24 hours     REASON FOR NOT PRESCRIBING ANTIPLATELET MEDICATION AT DISCHARGE     Order Specific Question Answer Comments   Reason for not Prescribing: Other (Comment)      REASON FOR NOT PRESCRIBING STATIN MEDICATION AT DISCHARGE     Activity as tolerated

## 2020-06-11 ENCOUNTER — CLINICAL SUPPORT (OUTPATIENT)
Dept: REHABILITATION | Facility: HOSPITAL | Age: 60
End: 2020-06-11
Payer: MEDICARE

## 2020-06-11 DIAGNOSIS — I61.9 HEMORRHAGIC CEREBROVASCULAR ACCIDENT (CVA): ICD-10-CM

## 2020-06-11 DIAGNOSIS — Z78.9 IMPAIRED MOBILITY AND ADLS: ICD-10-CM

## 2020-06-11 DIAGNOSIS — I69.359 HEMIPLEGIA AS LATE EFFECT OF STROKE: Primary | ICD-10-CM

## 2020-06-11 DIAGNOSIS — Z74.09 IMPAIRED MOBILITY AND ADLS: ICD-10-CM

## 2020-06-11 PROCEDURE — 97161 PT EVAL LOW COMPLEX 20 MIN: CPT | Mod: HCNC,PO

## 2020-06-11 NOTE — PLAN OF CARE
[facility]  Physical Therapy Neurological Rehabilitation Initial Evaluation    Name: Lucy Burgos [facility] Number: [id number]    Therapy Diagnosis:   Encounter Diagnoses   Name Primary?    Hemorrhagic cerebrovascular accident (CVA)     LEFT Hemiplegia as late effect of stroke Yes    Impaired mobility and ADLs      Physician: Nj Reed MD    Physician Orders: PT Wheelchair EVAL   Medical Diagnosis from Referral: N18.6,Z99.2 (ICD-10-CM) - ESRD (end stage renal disease) on dialysis   Evaluation Date: 6/11/2020  Authorization Period Expiration: 07/11/2020   Plan of Care Expiration: Eval only  Visit # / Visits authorized: 1/ 1    Time In: 0855  Time Out: 0945  Total Billable Time: 50 minutes    Precautions: Standard and Fall    Subjective   Date of onset:  CVA 2016 resulting in L hemiplegia  History of current condition - Lucy reports: Here for WC evaluation. Has been in manual WC since 2016, has not walked >1 year. She is currently unable to propel the manual WC due to L arm weakness and R UE pain from dialysis port with multiple revisions.  PMH includes ESRD.       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    Cortical cataract of both eyes 7/26/2016    Decubitus ulcer of buttock, stage 2     Degeneration of lumbar or lumbosacral intervertebral disc 3/5/2013    S/p MRI L-spine 5/2009     Depression     Encounter for blood transfusion     ESRD on hemodialysis     Started August 2018    General anesthetics causing adverse effect in therapeutic use     Hemorrhagic cerebrovascular accident (CVA)     8[place] [facility] [place]    History of stroke 6/28/2017    s/p R-MCA stroke with R-putaminal hemorrhagic transformation in 8/2016 and 11/2016 ([place] [facility] with residual L hemiparesis, on AED s/p CVA      Hypertensive retinopathy of both eyes 7/26/2016    Impingement syndrome of right shoulder 7/2/2014    Obesity     THERESA (obstructive sleep apnea) 3/5/2013    No Home CPAP 2ndary to cost     Partial symptomatic epilepsy with complex partial seizures, not intractable, without status epilepticus     Rheumatoid arthritis(714.0)     Rotator cuff tear 7/2/2014    Sarcoidosis     Stroke 2016    left sided flaccidity, SAH    Vertebral artery stenosis 3/5/2013    S/p Stenting per Dr Burnett        Surgical History:   Lucy Perez  has a past surgical history that includes Tubal ligation; Rotator cuff repair (Right, July 9, 2014); Breast surgery; stent placed; Colonoscopy (N/A, 8/11/2016); Skull surgery; Total Reduction Mammoplasty; Placement of arteriovenous graft (Right, 10/18/2018); Fistulogram (Right, 2/11/2019); Declotting of vascular graft (Right, 6/20/2019); Fistulogram (Right, 7/8/2019); Hysterectomy (1999); Fistulogram (Right, 12/6/2019); Declotting of vascular graft (Right, 12/6/2019); Laparoscopic lysis of adhesions (N/A, 3/9/2020); Fistulogram (Right, 3/12/2020); Diagnostic laparoscopy (N/A, 6/8/2020); and Laparoscopic revision of procedure involving peritoneal dialysis catheter (N/A, 6/8/2020).    Medications:   Lucy has a current medication list which includes the following prescription(s): acetaminophen, albuterol, aspirin, atorvastatin, bd ultra-fine short pen needle, bisacodyl, blood sugar diagnostic, blood-glucose meter, clopidogrel, cyclobenzaprine, dantrolene, diclofenac sodium, ergocalciferol, famotidine, fluoxetine, folic acid, glipizide, insulin, insulin aspart u-100, levetiracetam, levetiracetam, lidocaine, lorazepam, ondansetron, oxycodone, pen  needle, diabetic, polyethylene glycol, pregabalin, sevelamer carbonate, tramadol, and trueplus lancets.    Allergies:   Review of patient's allergies indicates:   Allergen Reactions    Lisinopril Other (See Comments)     Angioedema      Vicodin [hydrocodone-acetaminophen] Rash     No problem with acetaminophen          Prior Therapy: immediately after CVA, HHPT, none recently  Social History:  lives with their spouse  Falls: 0  DME: Manual wheelchair     Home Environment: lift to enter, 1 level home  Family Present at time of Eval: daughter   Occupation: n/a  Prior Level of Function: Prior to CVA Independent  Current Level of Function: Dependent in manual WC, shaunna lift transfers    Pain:  Current 7/10 Location: left leg and him and arm     Pts goals: To obtain custom PWC for mobility independence     Objective     Lucy Perez received wheelchair mobility assessment x 50 min.    Custom Power wheelchair evaluation completed today.  See scanned media for 6/11/2020  for full mobility assessment form including tests and measures and final recommendations.   WC Vendor CAMILLA Piper from [employer] present for assessment.        TREATMENT   No treatment provided today. All time spent on evaluation.     Home Exercises and Patient Education Provided    Education provided: POC, WC process with vendor    Written Home Exercises Provided: No. .       Assessment   See full written assessment in scanned media, page 16/16.     Pt prognosis is Fair.     Plan of care discussed with patient: Yes  Pt's spiritual, cultural and educational needs considered and patient is agreeable to the plan of care.    Anticipated Barriers for therapy: none, 1 time visit for WC eval.     Medical Necessity is demonstrated by the following  History  Co-morbidities and personal factors that may impact the plan of care Co-morbidities:   ESRD    Personal Factors:   no deficits     moderate   Examination  Body Structures and  Functions, activity limitations and participation restrictions that may impact the plan of care Body Regions:   back  lower extremities  upper extremities  trunk    Body Systems:    gross symmetry  ROM  strength  gross coordinated movement  balance  gait  transfers  transitions  motor control    Activity limitations:   Learning and applying knowledge  no deficits    General Tasks and Commands  no deficits    Communication  no deficits    Mobility  lifting and carrying objects  fine hand use (grasping/picking up)  walking  moving around using equipment (WC)  using transportation (bus, train, plane, car)  driving (bike, car, motorcycle)    Self care  washing oneself (bathing, drying, washing hands)  caring for body parts (brushing teeth, shaving, grooming)  toileting  dressing  eating  drinking  looking after one's health    Domestic Life  shopping  cooking  doing house work (cleaning house, washing dishes, laundry)  assisting others    Interactions/Relationships  no deficits    Life Areas  employment  basic economic transactions    Community and Social Life  community life  recreation and leisure  Denominational and spirituality         high   Clinical Presentation stable and uncomplicated low   Decision Making/ Complexity Score: low        Plan   Plan of care Certification: 6/11/2020 eval only.    Pt to follow up with  vendor for further  questions and delivery.     Kacy Buck, PT

## 2020-06-13 ENCOUNTER — HOSPITAL ENCOUNTER (EMERGENCY)
Facility: HOSPITAL | Age: 60
Discharge: HOME | End: 2020-06-14
Attending: EMERGENCY MEDICINE
Payer: MEDICARE

## 2020-06-13 DIAGNOSIS — R06.02 SOB (SHORTNESS OF BREATH): ICD-10-CM

## 2020-06-13 DIAGNOSIS — T82.590A MALFUNCTION OF ARTERIOVENOUS DIALYSIS FISTULA: ICD-10-CM

## 2020-06-13 DIAGNOSIS — Z78.9 PROBLEM WITH VASCULAR ACCESS: Primary | ICD-10-CM

## 2020-06-13 LAB
ALBUMIN SERPL BCP-MCNC: 3.3 G/DL (ref 3.5–5.2)
ALP SERPL-CCNC: 95 U/L (ref 55–135)
ALT SERPL W/O P-5'-P-CCNC: <5 U/L (ref 10–44)
ANION GAP SERPL CALC-SCNC: 17 MMOL/L (ref 8–16)
AST SERPL-CCNC: 7 U/L (ref 10–40)
BASOPHILS # BLD AUTO: 0.04 K/UL (ref 0–0.2)
BASOPHILS NFR BLD: 0.8 % (ref 0–1.9)
BILIRUB SERPL-MCNC: 0.4 MG/DL (ref 0.1–1)
BUN SERPL-MCNC: 90 MG/DL (ref 8–23)
CALCIUM SERPL-MCNC: 9.3 MG/DL (ref 8.7–10.5)
CHLORIDE SERPL-SCNC: 103 MMOL/L (ref 95–110)
CO2 SERPL-SCNC: 22 MMOL/L (ref 23–29)
CREAT SERPL-MCNC: 10.2 MG/DL (ref 0.5–1.4)
DIFFERENTIAL METHOD: ABNORMAL
EOSINOPHIL # BLD AUTO: 0.2 K/UL (ref 0–0.5)
EOSINOPHIL NFR BLD: 3.6 % (ref 0–8)
ERYTHROCYTE [DISTWIDTH] IN BLOOD BY AUTOMATED COUNT: 15.3 % (ref 11.5–14.5)
EST. GFR  (AFRICAN AMERICAN): 4.2 ML/MIN/1.73 M^2
EST. GFR  (NON AFRICAN AMERICAN): 3.7 ML/MIN/1.73 M^2
GLUCOSE SERPL-MCNC: 150 MG/DL (ref 70–110)
HCT VFR BLD AUTO: 34.3 % (ref 37–48.5)
HGB BLD-MCNC: 10.4 G/DL (ref 12–16)
IMM GRANULOCYTES # BLD AUTO: 0.02 K/UL (ref 0–0.04)
IMM GRANULOCYTES NFR BLD AUTO: 0.4 % (ref 0–0.5)
INR PPP: 1 (ref 0.8–1.2)
LYMPHOCYTES # BLD AUTO: 1.3 K/UL (ref 1–4.8)
LYMPHOCYTES NFR BLD: 25.4 % (ref 18–48)
MCH RBC QN AUTO: 31.5 PG (ref 27–31)
MCHC RBC AUTO-ENTMCNC: 30.3 G/DL (ref 32–36)
MCV RBC AUTO: 104 FL (ref 82–98)
MONOCYTES # BLD AUTO: 0.5 K/UL (ref 0.3–1)
MONOCYTES NFR BLD: 8.9 % (ref 4–15)
NEUTROPHILS # BLD AUTO: 3.2 K/UL (ref 1.8–7.7)
NEUTROPHILS NFR BLD: 60.9 % (ref 38–73)
NRBC BLD-RTO: 0 /100 WBC
PLATELET # BLD AUTO: 193 K/UL (ref 150–350)
PMV BLD AUTO: 10.2 FL (ref 9.2–12.9)
POTASSIUM SERPL-SCNC: 5.5 MMOL/L (ref 3.5–5.1)
PROT SERPL-MCNC: 7.4 G/DL (ref 6–8.4)
PROTHROMBIN TIME: 10.6 SEC (ref 9–12.5)
RBC # BLD AUTO: 3.3 M/UL (ref 4–5.4)
SODIUM SERPL-SCNC: 142 MMOL/L (ref 136–145)
WBC # BLD AUTO: 5.28 K/UL (ref 3.9–12.7)

## 2020-06-13 PROCEDURE — 80053 COMPREHEN METABOLIC PANEL: CPT | Mod: HCNC

## 2020-06-13 PROCEDURE — 99284 PR EMERGENCY DEPT VISIT,LEVEL IV: ICD-10-PCS | Mod: HCNC,,, | Performed by: EMERGENCY MEDICINE

## 2020-06-13 PROCEDURE — 85025 COMPLETE CBC W/AUTO DIFF WBC: CPT | Mod: HCNC

## 2020-06-13 PROCEDURE — 93005 ELECTROCARDIOGRAM TRACING: CPT | Mod: HCNC

## 2020-06-13 PROCEDURE — 99284 EMERGENCY DEPT VISIT MOD MDM: CPT | Mod: HCNC,,, | Performed by: EMERGENCY MEDICINE

## 2020-06-13 PROCEDURE — 93010 EKG 12-LEAD: ICD-10-PCS | Mod: HCNC,,, | Performed by: INTERNAL MEDICINE

## 2020-06-13 PROCEDURE — 93010 ELECTROCARDIOGRAM REPORT: CPT | Mod: HCNC,,, | Performed by: INTERNAL MEDICINE

## 2020-06-13 PROCEDURE — 99285 EMERGENCY DEPT VISIT HI MDM: CPT | Mod: 25,HCNC

## 2020-06-13 PROCEDURE — 85610 PROTHROMBIN TIME: CPT | Mod: HCNC

## 2020-06-14 VITALS
RESPIRATION RATE: 20 BRPM | HEIGHT: 63 IN | WEIGHT: 212.5 LBS | BODY MASS INDEX: 37.65 KG/M2 | HEART RATE: 80 BPM | DIASTOLIC BLOOD PRESSURE: 85 MMHG | SYSTOLIC BLOOD PRESSURE: 165 MMHG | OXYGEN SATURATION: 98 % | TEMPERATURE: 99 F

## 2020-06-14 NOTE — ED PROVIDER NOTES
"Encounter Date: 6/13/2020       History     Chief Complaint   Patient presents with    Vascular Access Problem     Pt sent to ER for diaylsis per clinic due to "clots in fistula." Had partial dialysis on thursday. Pt states "I feel full" Denies CP or SOB.      HPI     59-year-old  female history of anxiety, anemia of chronic disease, CHF, ESRD, Tuesday Thursday Saturday, CVA with left hemiplegia, who comes in today for a clotted fistula.  Her her daughter at bedside, on Tuesday of this past week, she went to dialysis and was only able to have a partial session, seated clotted off, she then went for revision on Wednesday.  Thursday with their only able to have a partial session, Friday she had peritoneal dialysis.  She had PD catheter revision on 06/08.  They then attempted to go to get dialysis today, and they were not able to use the fistula site.  She has had problems in the past with clotting.  She denies any headache, vision changes, chest pain, shortness of breath, leg swelling, abdominal pain, cough, fever, chills, change in bowel or urinary habits.    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    Cortical cataract of both eyes 7/26/2016    Decubitus ulcer of buttock, stage 2     Degeneration of lumbar or lumbosacral intervertebral disc 3/5/2013    S/p MRI L-spine 5/2009     Depression     Encounter for blood transfusion     ESRD on hemodialysis     Started August " 2018    General anesthetics causing adverse effect in therapeutic use     Hemorrhagic cerebrovascular accident (CVA)     8/2016 s/p Hemicraniotomy at [facility] with Left hemiparesis    History of stroke 6/28/2017    s/p R-MCA stroke with R-putaminal hemorrhagic transformation in 8/2016 and 11/2016 (s/p hemicraniotomy at [facility]) with residual L hemiparesis, on AED s/p CVA      Hypertensive retinopathy of both eyes 7/26/2016    Impingement syndrome of right shoulder 7/2/2014    Obesity     THERESA (obstructive sleep apnea) 3/5/2013    No Home CPAP 2ndary to cost     Partial symptomatic epilepsy with complex partial seizures, not intractable, without status epilepticus     Rheumatoid arthritis(714.0)     Rotator cuff tear 7/2/2014    Sarcoidosis     Stroke 2016    left sided flaccidity, SAH    Vertebral artery stenosis 3/5/2013    S/p Stenting per Dr Burnett      Past Surgical History:   Procedure Laterality Date    BREAST SURGERY      breast reduction    COLONOSCOPY N/A 8/11/2016    Procedure: COLONOSCOPY;  Surgeon: Jerry Vilchis MD;  Location: [facility] (4TH FLR);  Service: Endoscopy;  Laterality: N/A;  Patient reports difficulty awaking from anesthesia in the past.    DECLOTTING OF VASCULAR GRAFT Right 6/20/2019    Procedure: DECLOT-GRAFT;  Surgeon: Cabrera Irwin MD;  Location: [facility] CATH LAB;  Service: Cardiology;  Laterality: Right;    DECLOTTING OF VASCULAR GRAFT Right 12/6/2019    Procedure: DECLOT-GRAFT;  Surgeon: Cabrera Irwin MD;  Location: [facility] OR 2ND FLR;  Service: Peripheral Vascular;  Laterality: Right;    DECLOTTING OF VASCULAR GRAFT N/A 6/10/2020    Procedure: Declotting, Vascular Graft;  Surgeon: SERENA Buchanan II, MD;  Location: [facility] CATH LAB;  Service: Vascular;  Laterality: N/A;    DIAGNOSTIC LAPAROSCOPY N/A 6/8/2020    Procedure: LAPAROSCOPY, DIAGNOSTIC (POSSIBLE PD CATH REVISION);  Surgeon: Paige Monsalve MD;  Location: [facility] OR 2ND FLR;  Service: General;   Laterality: N/A;    FISTULOGRAM Right 2/11/2019    Procedure: Fistulogram;  Surgeon: Meir Valencia MD;  Location: [facility] CATH LAB;  Service: Peripheral Vascular;  Laterality: Right;    FISTULOGRAM Right 7/8/2019    Procedure: Fistulogram;  Surgeon: WELLINGTON Palm III, MD;  Location: [facility] CATH LAB;  Service: Peripheral Vascular;  Laterality: Right;    FISTULOGRAM Right 12/6/2019    Procedure: Fistulogram;  Surgeon: Cabrera Irwin MD;  Location: [facility] OR [facility] FLR;  Service: Peripheral Vascular;  Laterality: Right;    FISTULOGRAM Right 3/12/2020    Procedure: FISTULOGRAM;  Surgeon: Meir Valencia MD;  Location: [facility] CATH LAB;  Service: Peripheral Vascular;  Laterality: Right;    HYSTERECTOMY  1999    JOSE LUIS/BSO ([facility])    LAPAROSCOPIC LYSIS OF ADHESIONS N/A 3/9/2020    Procedure: LYSIS, ADHESIONS, LAPAROSCOPIC;  Surgeon: Paige Monsalve MD;  [facility];  Service: General;  Laterality: N/A;    LAPAROSCOPIC REVISION OF PROCEDURE INVOLVING PERITONEAL DIALYSIS CATHETER N/A 6/8/2020    Procedure: REVISION OF PROCEDURE INVOLVING PERITONEAL DIALYSIS CATHETER, LAPAROSCOPIC;  Surgeon: Paige Monsalve MD;  [facility];  Service: General;  Laterality: N/A;    PLACEMENT OF ARTERIOVENOUS GRAFT Right 10/18/2018    Procedure: AV GRAFT CREATION;  Surgeon: Meir Valencia MD;  [facility];  Service: Cardiovascular;  Laterality: Right;    ROTATOR CUFF REPAIR Right July 9, 2014    right side    Skull surgery      Aneurysm    stent placed      in vertebral artery    TOTAL REDUCTION MAMMOPLASTY      TUBAL LIGATION       Family History   Problem Relation Age of Onset    Diabetes Mother     Hypertension Mother     Heart disease Mother     Heart attack Mother     Breast cancer Mother     Stroke Sister     Hypertension Sister     Sleep apnea Sister     No Known Problems Daughter     Diabetes Son     Bell's palsy Sister     Lupus Sister     Blindness  "Maternal Grandmother     Diabetes Unknown         "My entire family family has diabetes"    Stroke Maternal Aunt     Colon cancer Neg Hx     Ovarian cancer Neg Hx      Social History     Tobacco Use    Smoking status: Never Smoker    Smokeless tobacco: Never Used   Substance Use Topics    Alcohol use: Yes     Frequency: Monthly or less     Drinks per session: 1 or 2     Binge frequency: Never     Comment: occasional wine cooler     Drug use: Never     Review of Systems   Constitutional: Negative for fever.   HENT: Negative for sore throat.    Respiratory: Negative for shortness of breath.    Cardiovascular: Negative for chest pain.        AV fistula malfunction   Gastrointestinal: Negative for nausea.   Genitourinary: Negative for dysuria.   Musculoskeletal: Negative for back pain.   Skin: Negative for rash.        AV fistula malfunction   Neurological: Negative for weakness.   Hematological: Does not bruise/bleed easily.       Physical Exam     Initial Vitals [06/13/20 2002]   BP Pulse Resp Temp SpO2   (!) 157/74 74 20 98.8 °F (37.1 °C) 95 %      MAP       --         Vitals:    06/13/20 2212 06/13/20 2213 06/14/20 0030 06/14/20 0320   BP:  (!) 178/81 (!) 148/67 (!) 165/85   BP Location:       Patient Position:       Pulse:  69 73 80   Resp: 17  17 20   Temp:       TempSrc:       SpO2:  97% (!) 94% 98%   Weight:       Height:           Physical Exam    Constitutional:   Obese,  female, resting comfortably in bed, nontoxic appearing, blood pressure 170/81, satting at 98% on room air, speaking full sentences, wheelchair at bedside,   HENT:   Head: Atraumatic.   Right-sided temporal wasting   Eyes: EOM are normal. Pupils are equal, round, and reactive to light.   Neck: Normal range of motion. Neck supple.   Cardiovascular: Normal rate, regular rhythm, normal heart sounds and intact distal pulses. Exam reveals no gallop and no friction rub.    No murmur heard.  Pulmonary/Chest: Breath sounds " normal. No respiratory distress. She has no wheezes. She has no rhonchi. She has no rales. She exhibits no tenderness.   Abdominal: Soft. Bowel sounds are normal. She exhibits no distension. There is no abdominal tenderness. There is no rebound and no guarding.   Peritoneal dialysis left lower abdomen, clean dry intact,   Neurological: She is alert and oriented to person, place, and time.   Left-sided hemiplegia,  Right upper extremity 5/5 strength, right lower extremity 5/5 strength   Skin: Skin is warm and dry. Capillary refill takes less than 2 seconds.   AV fistula site of right upper extremity, no thrill appreciated, recent surgical site, clean dry intact, with staples in place, no surrounding erythema, not tender to touch,         ED Course   Procedures  Labs Reviewed   CBC W/ AUTO DIFFERENTIAL - Abnormal; Notable for the following components:       Result Value    RBC 3.30 (*)     Hemoglobin 10.4 (*)     Hematocrit 34.3 (*)     Mean Corpuscular Volume 104 (*)     Mean Corpuscular Hemoglobin 31.5 (*)     Mean Corpuscular Hemoglobin Conc 30.3 (*)     RDW 15.3 (*)     All other components within normal limits   COMPREHENSIVE METABOLIC PANEL - Abnormal; Notable for the following components:    Potassium 5.5 (*)     CO2 22 (*)     Glucose 150 (*)     BUN, Bld 90 (*)     Creatinine 10.2 (*)     Albumin 3.3 (*)     AST 7 (*)     ALT <5 (*)     Anion Gap 17 (*)     eGFR if  4.2 (*)     eGFR if non  3.7 (*)     All other components within normal limits   PROTIME-INR     EKG Readings: (Independently Interpreted)   Sinus rhythm at a rate of 66 beats per minute with prolonged AL interval of 212 milliseconds consistent with first-degree AV block; left axis deviation; QRS and QTC intervals within normal limits; no STEMI.  ____________________  Torres Rivera MD, [facility]  Emergency Medicine Staff  1:17 AM 6/14/2020       ECG Results          EKG 12-lead (Final result)  Result time 06/14/20  14:47:49    Final result by Interface, Lab In [facility] (06/14/20 14:47:49)                 Narrative:    Test Reason : R06.02,    Vent. Rate : 066 BPM     Atrial Rate : 066 BPM     P-R Int : 212 ms          QRS Dur : 078 ms      QT Int : 442 ms       P-R-T Axes : 045 -31 055 degrees     QTc Int : 463 ms    Sinus rhythm with 1st degree A-V block  Left axis deviation Now present  Abnormal ECG  When compared with ECG of 04-MAR-2020 01:31,    Confirmed by CELSA CHENG MD ([id number]) on 6/14/2020 2:47:37 PM    Referred By: [email or web address]   SELF           Confirmed By:CELSA CHENG MD                            Imaging Results          US Upper Extremity Arteries Right (Final result)  Result time 06/14/20 02:24:28   Procedure changed from US Extremity Non Vascular Limited Right     Final result by Maycol Rodriguez MD (06/14/20 02:24:28)                 Impression:      Unremarkable examination of patient's right upper extremity AV dialysis fistula.    Electronically signed by resident: Mahamed Goff  Date:    06/14/2020  Time:    01:49    Electronically signed by: Maycol Rodriguez MD  Date:    06/14/2020  Time:    02:24             Narrative:    EXAMINATION:  US UPPER EXTREMITY ARTERIES RIGHT    CLINICAL HISTORY:  Malfunction of arteriovenous dialysis fistula;  Other mechanical complication of surgically created arteriovenous fistula, initial encounter    TECHNIQUE:  Limited ultrasound evaluation of patient's dialysis fistula in the right upper extremity was performed.    COMPARISON:  Ultrasound upper extremity 07/06/2019, 01/18/2020.    FINDINGS:  Ultrasound evaluation was performed over patient's right upper extremity.  Patient has a brachial artery to axillary vein dialysis fistula.    AV fistula in the right upper extremity is patent with flow visualized on color Doppler imaging.  Surrounding soft tissues are unremarkable.                               X-Ray Chest PA And Lateral (Final result)  Result time 06/13/20 23:17:31     "Final result by Maycol Rodriguez MD (06/13/20 23:17:31)                 Impression:      Overall limited quality study with mild cardiomegaly, elevation of the right hemidiaphragm, and mild bibasilar subsegmental atelectasis.      Electronically signed by: Maycol Rodriguez MD  Date:    06/13/2020  Time:    23:17             Narrative:    EXAMINATION:  XR CHEST PA AND LATERAL    CLINICAL HISTORY:  Provided history is "  Shortness of breath".    TECHNIQUE:  Frontal and lateral views of the chest were performed.    COMPARISON:  08/06/2019.    FINDINGS:  Examination is limited by portable technique.  Lateral radiograph is markedly limited by soft tissue attenuation by overlapping of the patient's extremities and motion blurring.  Cardiac wires overlie the chest.  Cardiomediastinal silhouette is mildly enlarged but stable.  Atherosclerotic calcifications overlie the aortic arch.  Right subclavian vascular stent present.  Right hemidiaphragm is elevated.  Mild bibasilar subsegmental atelectasis and coarsened perihilar lung markings.  No large focal consolidation.  No sizable pleural effusion.  No pneumothorax.                                 Medical Decision Making:   History:   Old Medical Records: I decided to obtain old medical records.  Initial Assessment:   59-year-old  female history of anxiety, anemia of chronic disease, CHF, ESRD, Tuesday Thursday Saturday, CVA with left hemiplegia, who comes in today for a clotted fistula.  On exam, she has known residual left-sided hemiplegia, no complaint of shortness of breath, she is speaking in full sentences, sat 98% on room air, does not appear fluid overloaded on exam, did have peritoneal dialysis yesterday.  Will get screening labs, ultrasound of fistula site.  She is afebrile, I have low concern for infectious symptoms, she is not volume overload on exam, she just had peritoneal dialysis yesterday.  Differential Diagnosis:   AV fistula clot, CHF " exacerbation, electrolyte abnormality,  Clinical Tests:   Lab Tests: Ordered  Radiological Study: Ordered  Medical Tests: Ordered              Attending Attestation:   Physician Attestation Statement for Resident:  As the supervising MD   Physician Attestation Statement: I have personally seen and examined this patient.   I agree with the above history. -:   As the supervising MD I agree with the above PE.    As the supervising MD I agree with the above treatment, course, plan, and disposition.            Attending ED Notes:   STAFF ATTENDING PHYSICIAN NOTE:  I have individually/jointly evaluated Lucy Perez and discussed their ED management with the resident physician. I have also reviewed their notes, assessments, and procedures documented.  I was present during all critical portions of any procedure(s) performed on Lucy Perez.   ____________________  Torres Rivera MD, [facility]  Emergency Medicine Staff  1:17 AM 6/14/2020            ED Course as of Jun 14 1625   Sun Jun 14, 2020   0258 Ultrasound right upper extremity of AV fistula results listed below   Ultrasound evaluation was performed over patient's right upper extremity.  Patient has a brachial artery to axillary vein dialysis fistula.     AV fistula in the right upper extremity is patent with flow visualized on color Doppler imaging.  Surrounding soft tissues are unremarkable.    [JG]   0259 Based on physical exam, patient history, labs, imaging, Ms. Perez does not have any acute emergent condition at this time, she is due to have peritoneal dialysis on Monday, at this point she does not qualify for emergent dialysis, I have given her her daughter strict return precautions, they will to participate in teach back voicing complete understanding of my discharge instructions.  She is safe for discharge    [JG]      ED Course User Index  [JG] Nabil Corcoran MD                Clinical Impression:       ICD-10-CM ICD-9-CM   1.  Problem with vascular access  Z78.9 V49.9   2. SOB (shortness of breath)  R06.02 786.05   3. Malfunction of arteriovenous dialysis fistula  T82.590A 996.1         Disposition:   Disposition: Discharged  Condition: Stable     ED Disposition Condition    Discharge Stable        ED Prescriptions     None        Follow-up Information     Follow up With Specialties Details Why Contact Info    [facility] Emergency Medicine  As needed, If symptoms worsen [address]  [facility] [address]  [phone]    Beverly Muniz MD Internal Medicine  For follow-up of ER visit. [address]  [facility] [address]  [phone]                        Future Appointments   Date Time Provider Department Center   7/8/2020  1:45 PM Fior Ricks DPM [facility] POD [facility]   8/19/2020  9:00 AM [facility]   8/22/2020  8:30 AM MD MASOUD Garcia IM [facility]   8/28/2020 11:30 AM PARESH Lacy, FNP [facility] IM [facility]          Fidel Rivera MD  06/14/20 1626

## 2020-06-14 NOTE — ED TRIAGE NOTES
Pt's dialysis has clotted on Tuesday, it was fixed but clotted again on Thursday and only got partial dialysis. Denies CP or SOB. Hx of stroke.    LOC: The patient is awake, alert, and oriented to place, time, situation. Affect is appropriate.  Speech is appropriate and clear.     APPEARANCE: Patient resting comfortably in no acute distress.  Patient is clean and well groomed.    SKIN: The skin is warm and dry; color consistent with ethnicity.  Patient has normal skin turgor and moist mucus membranes.  Fistula in the right upper arm. Peritoneal catheter in the left lower absomen     MUSCULOSKELETAL: Patient moving upper and lower extremities without difficulty.  Left sided weakness with contracture    RESPIRATORY: Airway is open and patent. Respirations spontaneous, even, easy, and non-labored.  Patient has a normal effort and rate.  No accessory muscle use noted. Denies cough.     CARDIAC:  Normal rhythm and rate noted.  No peripheral edema noted. No complaints of chest pain.      ABDOMEN: Soft and non tender to palpation.  No distention noted.     NEUROLOGIC: Eyes open spontaneously.  Behavior appropriate to situation.  Follows commands; facial expression symmetrical.  Purposeful motor response noted; normal sensation in all extremities.

## 2020-06-14 NOTE — DISCHARGE INSTRUCTIONS
Today you came to the emergency room to have your AV fistula evaluated, ultrasound revealed good flow through the fistula, your labs did not indicate that you need emergent dialysis at this time, however if over the next day you have shortness of breath, chest pain, nausea, vomiting, or any other concerning symptoms please return.  Please have your scheduled peritoneal dialysis on Monday.

## 2020-06-21 ENCOUNTER — NURSE TRIAGE (OUTPATIENT)
Dept: ADMINISTRATIVE | Facility: CLINIC | Age: 60
End: 2020-06-21

## 2020-06-21 NOTE — TELEPHONE ENCOUNTER
Attempted to contact patient on behalf of [facility]. No answer, day 13. Triage nurse will attempt call back tomorrow.     Reason for Disposition   No answer.  First attempt to contact caller.  Follow-up call scheduled within 15 minutes.    Protocols used: NO CONTACT OR DUPLICATE CONTACT CALL-A-AH

## 2020-06-22 NOTE — TELEPHONE ENCOUNTER
Pt contacted through the Post Procedural Symptom Tracker. No answer. No additional contact today as per post procedure protocol.   dy 14    Reason for Disposition   Second attempt to contact family AND no contact made.  Phone number verified.    Protocols used: NO CONTACT OR DUPLICATE CONTACT CALL-A-AH

## 2020-06-23 ENCOUNTER — TELEPHONE (OUTPATIENT)
Dept: INTERNAL MEDICINE | Facility: CLINIC | Age: 60
End: 2020-06-23

## 2020-06-26 ENCOUNTER — TELEPHONE (OUTPATIENT)
Dept: CARDIOLOGY | Facility: CLINIC | Age: 60
End: 2020-06-26

## 2020-06-26 DIAGNOSIS — R00.1 BRADYCARDIA: Primary | ICD-10-CM

## 2020-06-26 NOTE — TELEPHONE ENCOUNTER
----- Message from Betina Renee sent at 6/26/2020  1:16 PM CDT -----  Regarding: Low heart rate  Evonne [phone] with [facility] says the pt heart rate has been running low since Tuesday. (40, 42, 41 pressure was 115/64 44) Today at 11:30 a.m it was 46  LOV 2/10/20 Dr. Correa    Thanks

## 2020-06-29 ENCOUNTER — OFFICE VISIT (OUTPATIENT)
Dept: CARDIOLOGY | Facility: CLINIC | Age: 60
End: 2020-06-29
Payer: MEDICARE

## 2020-06-29 ENCOUNTER — HOSPITAL ENCOUNTER (OUTPATIENT)
Dept: CARDIOLOGY | Facility: CLINIC | Age: 60
Discharge: HOME | End: 2020-06-29
Payer: MEDICARE

## 2020-06-29 VITALS
HEART RATE: 75 BPM | HEIGHT: 63 IN | BODY MASS INDEX: 33.43 KG/M2 | SYSTOLIC BLOOD PRESSURE: 192 MMHG | WEIGHT: 188.69 LBS | DIASTOLIC BLOOD PRESSURE: 98 MMHG

## 2020-06-29 DIAGNOSIS — I10 ESSENTIAL HYPERTENSION: Primary | ICD-10-CM

## 2020-06-29 DIAGNOSIS — Z99.2 ESRD (END STAGE RENAL DISEASE) ON DIALYSIS: ICD-10-CM

## 2020-06-29 DIAGNOSIS — G47.33 OSA (OBSTRUCTIVE SLEEP APNEA): ICD-10-CM

## 2020-06-29 DIAGNOSIS — R42 EPISODE OF DIZZINESS: ICD-10-CM

## 2020-06-29 DIAGNOSIS — E66.9 TYPE 2 DIABETES MELLITUS WITH OBESITY: ICD-10-CM

## 2020-06-29 DIAGNOSIS — I65.23 BILATERAL CAROTID ARTERY STENOSIS: ICD-10-CM

## 2020-06-29 DIAGNOSIS — I70.0 ATHEROSCLEROSIS OF AORTA: ICD-10-CM

## 2020-06-29 DIAGNOSIS — R00.1 BRADYCARDIA: ICD-10-CM

## 2020-06-29 DIAGNOSIS — N18.6 ESRD (END STAGE RENAL DISEASE) ON DIALYSIS: ICD-10-CM

## 2020-06-29 DIAGNOSIS — N18.6 TYPE 2 DIABETES MELLITUS WITH CHRONIC KIDNEY DISEASE ON CHRONIC DIALYSIS, WITH LONG-TERM CURRENT USE OF INSULIN: ICD-10-CM

## 2020-06-29 DIAGNOSIS — E11.69 TYPE 2 DIABETES MELLITUS WITH OBESITY: ICD-10-CM

## 2020-06-29 DIAGNOSIS — Z99.2 TYPE 2 DIABETES MELLITUS WITH CHRONIC KIDNEY DISEASE ON CHRONIC DIALYSIS, WITH LONG-TERM CURRENT USE OF INSULIN: ICD-10-CM

## 2020-06-29 DIAGNOSIS — E11.22 TYPE 2 DIABETES MELLITUS WITH CHRONIC KIDNEY DISEASE ON CHRONIC DIALYSIS, WITH LONG-TERM CURRENT USE OF INSULIN: ICD-10-CM

## 2020-06-29 DIAGNOSIS — Z86.73 HISTORY OF STROKE: ICD-10-CM

## 2020-06-29 DIAGNOSIS — Z79.4 TYPE 2 DIABETES MELLITUS WITH CHRONIC KIDNEY DISEASE ON CHRONIC DIALYSIS, WITH LONG-TERM CURRENT USE OF INSULIN: ICD-10-CM

## 2020-06-29 DIAGNOSIS — E78.5 DYSLIPIDEMIA, GOAL LDL BELOW 70: ICD-10-CM

## 2020-06-29 PROCEDURE — 3051F HG A1C>EQUAL 7.0%<8.0%: CPT | Mod: HCNC,CPTII,S$GLB, | Performed by: NURSE PRACTITIONER

## 2020-06-29 PROCEDURE — 99999 PR PBB SHADOW E&M-EST. PATIENT-LVL V: ICD-10-PCS | Mod: PBBFAC,HCNC,, | Performed by: NURSE PRACTITIONER

## 2020-06-29 PROCEDURE — 93010 ELECTROCARDIOGRAM REPORT: CPT | Mod: HCNC,S$GLB,, | Performed by: INTERNAL MEDICINE

## 2020-06-29 PROCEDURE — 99214 OFFICE O/P EST MOD 30 MIN: CPT | Mod: 25,HCNC,S$GLB, | Performed by: NURSE PRACTITIONER

## 2020-06-29 PROCEDURE — 3008F PR BODY MASS INDEX (BMI) DOCUMENTED: ICD-10-PCS | Mod: HCNC,CPTII,S$GLB, | Performed by: NURSE PRACTITIONER

## 2020-06-29 PROCEDURE — 99999 PR PBB SHADOW E&M-EST. PATIENT-LVL V: CPT | Mod: PBBFAC,HCNC,, | Performed by: NURSE PRACTITIONER

## 2020-06-29 PROCEDURE — 99499 UNLISTED E&M SERVICE: CPT | Mod: HCNC,S$GLB,, | Performed by: NURSE PRACTITIONER

## 2020-06-29 PROCEDURE — 3051F PR MOST RECENT HEMOGLOBIN A1C LEVEL 7.0 - < 8.0%: ICD-10-PCS | Mod: HCNC,CPTII,S$GLB, | Performed by: NURSE PRACTITIONER

## 2020-06-29 PROCEDURE — 3008F BODY MASS INDEX DOCD: CPT | Mod: HCNC,CPTII,S$GLB, | Performed by: NURSE PRACTITIONER

## 2020-06-29 PROCEDURE — 99214 PR OFFICE/OUTPT VISIT, EST, LEVL IV, 30-39 MIN: ICD-10-PCS | Mod: 25,HCNC,S$GLB, | Performed by: NURSE PRACTITIONER

## 2020-06-29 PROCEDURE — 99499 RISK ADDL DX/OHS AUDIT: ICD-10-PCS | Mod: HCNC,S$GLB,, | Performed by: NURSE PRACTITIONER

## 2020-06-29 PROCEDURE — 93010 EKG 12-LEAD: ICD-10-PCS | Mod: HCNC,S$GLB,, | Performed by: INTERNAL MEDICINE

## 2020-06-29 NOTE — PATIENT INSTRUCTIONS
Continue No salt diet   Continue daily home BP checks and follow-up with Nephrology for BP management

## 2020-06-29 NOTE — PROGRESS NOTES
Subjective:   Chief Complaint: Bradycardia (x 1 weeks) and Dizziness      Problem List:   CVA with residual hemiparesis (left)  PD catheter   wheelchair bound  Diabetes  ESRD on HD  CVA  Vertebral artery stenting 1998  Recurrent AV fistula thrombosis/stenosis  Hypertension  Hyperlipidemia  Coronary artery disease by calcification  THERESA  Sarcoidosis  Anemia  PVD  Obesity    History of Present Illness: Lucy Perez is a 59 y.o. AA female patient of Dr. Correa who presents for evaluation of Bradycardia (x 1 weeks) and Dizziness.      She started performing home dialysis training on 6/22-6/24/2020 in which she was not completely removing enough fluid so she went into the dialysis clinic on 6/24-6/25 for dialysis with 2-3 liters removed each of those 2 days. On 6/26 she performed home dialysis in which she began feeling dizzy and bradycardic for that one day which resolved by 6/27/2020. Her regimen is home dialysis for 10 hours a day 7 days a week.   She brought in the following Home BP/HR readings  6/20-HR 74, /93  6/26 HR 46, 174/77 dizziness (in the setting of home dialysis)  6/27 HR 65 130/81, HR 87, 123/82 (no symptoms)  6/28 81, 146/80  6/29 75, 169/85    She is predominantly wheelchair bound, thus unable to gauge exercise tolerance.  She denies any symptoms of chest pain, shortness of breath at rest, cough, peripheral edema, palpitations, presyncope, syncope or claudication.     She had a vertebral artery stent possibly after stroke in 1998 1999, was on single antiplatelet therapy for many years, and then started on dual antiplatelet therapy in 2019 by Dr. Valencia apparently after recurrent fistula issues.   She had a preoperative evaluation completed in September by Dr. Storey in the setting of kidney transplant listing.  She also had a negative stress test at that time.      Clinic BP/HR: 192/98 HR 75  Clinic weights: 188#, weight recorded 6/13/2020 was 212#  Renal diet  No exercise regimen    LDL: 62 at goal   Adherent with medication  CV Family history: Mother-MI, heart disease, HTN, DM  Sister-HTN, Lupus, THERESA, stroke  On DAPT-denies any symptoms of bleeding     09/05/2019 Stress echo  · The stress echo portion of this study is negative for myocardial ischemia.  · The EKG portion of this study is negative for myocardial ischemia.  · Arrhythmias during stress: rare PVCs,  · Normal left ventricular systolic function. The estimated ejection fraction is 65%  · Concentric left ventricular remodeling.  · Indeterminate left ventricular diastolic function.  · No wall motion abnormalities.  · Severe left atrial enlargement.  · Normal right ventricular systolic function.  · Trivial circumferential pericardial effusion.   Social History:  Lucy reports that she has never smoked. She has never used smokeless tobacco. She reports current alcohol use. She reports that she does not use drugs.    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    Cortical cataract of both eyes 7/26/2016    Decubitus ulcer of buttock, stage 2     Degeneration of lumbar or lumbosacral intervertebral disc 3/5/2013    S/p MRI L-spine 5/2009     Depression     Encounter for blood transfusion     ESRD on hemodialysis     Started August 2018    General anesthetics causing adverse effect in therapeutic use     Hemorrhagic cerebrovascular accident (CVA)     [place] [facility]    History of stroke 6/28/2017    s/p R-MCA stroke with  R-putaminal hemorrhagic transformation in 8/2016 and 11/2016 (s/p [facility]micraniotomy at [facility] [place]) with residual L hemiparesis, on AED s/p CVA      Hypertensive retinopathy of both eyes 7/26/2016    Impingement syndrome of right shoulder 7/2/2014    Obesity     THERESA (obstructive sleep apnea) 3/5/2013    No Home CPAP 2ndary to cost     Partial symptomatic epilepsy with complex partial seizures, not intractable, without status epilepticus     Rheumatoid arthritis(714.0)     Rotator cuff tear 7/2/2014    Sarcoidosis     Stroke 2016    left sided flaccidity, SAH    Vertebral artery stenosis 3/5/2013    S/p Stenting per Dr Burnett        Review of Systems   Constitution: Positive for decreased appetite and malaise/fatigue. Negative for chills, diaphoresis, fever, weight gain and weight loss.        Denies change in activity level   HENT: Negative for congestion and nosebleeds.    Eyes: Negative for visual disturbance.        No amaurosis fugax; +cataracts   Cardiovascular: Positive for orthopnea. Negative for chest pain, cyanosis, irregular heartbeat, leg swelling, near-syncope, palpitations and syncope.        As per HPI above   Respiratory: Negative for cough, hemoptysis, shortness of breath, sleep disturbances due to breathing and wheezing.         +THERESA, waiting on CPAP machine, sleeps on one pillow   Hematologic/Lymphatic: Negative for bleeding problem.   Skin: Negative for color change, nail changes and rash.   Musculoskeletal: Positive for arthritis, back pain, gout (history of gout), joint pain (knee/elbow/shoulder) and joint swelling. Negative for falls, muscle cramps and muscle weakness.        Denies muscle aches   Gastrointestinal: Positive for constipation. Negative for abdominal pain, diarrhea, hematemesis, hematochezia, melena, nausea and vomiting.   Genitourinary: Negative for dysuria and hematuria.        No change in urinary output   Neurological: Positive for excessive daytime sleepiness. Negative for  "dizziness, headaches, light-headedness, loss of balance, tremors, vertigo and weakness.        CVA with residual hemiparesis (left)     Psychiatric/Behavioral: Negative for altered mental status. The patient is not nervous/anxious.    Allergic/Immunologic:        Drug allergies listed elsewhere if present          Medications:  Outpatient Encounter Medications as of 6/29/2020   Medication Sig Dispense Refill    acetaminophen (TYLENOL) 325 MG tablet Take 2 tablets (650 mg total) by mouth every 6 (six) hours as needed for Pain.  0    albuterol (VENTOLIN HFA) 90 mcg/actuation inhaler Inhale 2 puffs into the lungs every 6 (six) hours as needed for Wheezing. Rescue 18 g 0    aspirin (ECOTRIN) 81 MG EC tablet Take 81 mg by mouth every morning.       atorvastatin (LIPITOR) 40 MG tablet TAKE 1 TABLET ONE TIME DAILY FOR CHOLESTEROL 90 tablet 2    BD INSULIN PEN NEEDLE UF SHORT 31 gauge x 5/16" Ndle USE TO INJECT NOVOLOG FLEXPEN BEFORE MEALS 150 each 11    bisacodyl (DULCOLAX) 10 mg Supp Place 1 suppository (10 mg total) rectally daily as needed. 30 suppository 3    blood sugar diagnostic Strp To check BG 4  times daily, to use with insurance preferred meter-true metrix 400 each 3    blood-glucose meter kit To check BG 4 times daily, to use with insurance preferred meter-true metrix 1 each 1    clopidogrel (PLAVIX) 75 mg tablet Take 1 tablet (75 mg total) by mouth once daily. 120 tablet 6    cyclobenzaprine (FLEXERIL) 5 MG tablet 1 tab Q8 hours as needed for back pain 30 tablet 0    dantrolene (DANTRIUM) 50 MG Cap Take one capsule three time daily for 1 week, then increase two tablet (Patient taking differently: Take 50 mg by mouth 3 (three) times daily. Take one capsule three time daily for 1 week, then increase two tablet) 180 capsule 2    diclofenac sodium (VOLTAREN) 1 % Gel Apply 2gm to toe or hip  2-3x/day as needed 100 g 0    ergocalciferol (ERGOCALCIFEROL) 50,000 unit Cap Take 1 capsule (50,000 Units " "total) by mouth every 7 days. 12 capsule 2    famotidine (PEPCID) 20 MG tablet Take 1 tablet (20 mg total) by mouth once daily. 90 tablet 2    FLUoxetine 20 MG capsule Take 1 capsule (20 mg total) by mouth once daily. 90 capsule 1    folic acid (FOLVITE) 1 MG tablet Take 1 tablet (1,000 mcg total) by mouth once daily. 90 tablet 2    glipiZIDE (GLUCOTROL) 2.5 MG TR24 Take 1 tablet (hold if less than 150) at lunch (non dialysis days) 30 tablet 4    insulin (LANTUS SOLOSTAR U-100 INSULIN) glargine 100 units/mL (3mL) SubQ pen Inject 12 units at night. (Patient taking differently: Inject 14 Units into the skin every evening. Inject 14 units at night.) 6 mL 6    insulin aspart U-100 (NOVOLOG U-100 INSULIN ASPART) 100 unit/mL injection Use correction scale 180-230+1, 231-280+2, 281-330+3, 331-380+4, >380+5, max 15 units. 3 vial 3    levETIRAcetam (KEPPRA) 1000 MG tablet Take 1 tablet (1,000 mg total) by mouth once daily. (Patient taking differently: Take 1,000 mg by mouth once daily. On non dialysis days) 90 tablet 3    levETIRAcetam (KEPPRA) 500 MG Tab Take 1 tablet (500 mg total) by mouth every Mon, Wed, Fri. Monday Wednesday and Friday after DIALYSIS take additional 500mg (Patient taking differently: Take 500 mg by mouth 2 (two) times daily. Twice daily on dialysis days) 36 tablet 3    lidocaine (LIDODERM) 5 % Place 1 patch onto the skin once daily. Remove & Discard patch within 12 hours or as directed by MD 10 patch 1    LORazepam (ATIVAN) 0.5 MG tablet 1 tab 1 hour prior to Dialysis 30 tablet 0    ondansetron (ZOFRAN-ODT) 4 MG TbDL Take 1 tablet (4 mg total) by mouth every 8 (eight) hours as needed. 10 tablet 0    oxyCODONE (OXY-IR) 5 mg Cap Take 1 capsule (5 mg total) by mouth every 6 (six) hours as needed for Pain. 15 capsule 0    pen needle, diabetic (BD EMI 2ND GEN PEN NEEDLE) 32 gauge x 5/32" Ndle Uses once a day, 90 day 100 each 3    polyethylene glycol (MIRALAX) 17 gram/dose powder Take 17 g by " "mouth 2 (two) times daily. 1 Bottle 6    pregabalin (LYRICA) 25 MG capsule Take 1 capsule (25 mg total) by mouth once daily. May take additional dose after HD. 135 capsule 1    sevelamer carbonate (RENVELA) 800 mg Tab Take 800 mg by mouth 3 (three) times daily with meals.      traMADoL (ULTRAM) 50 mg tablet Take 1 tablet (50 mg total) by mouth every 8 (eight) hours as needed for Pain. 1 tab every 8-12 hours as needed for pain 30 tablet 0    TRUEPLUS LANCETS 33 gauge Misc        No facility-administered encounter medications on file as of 6/29/2020.      Family History:  Lucy's family history includes Bell's palsy in her sister; Blindness in her maternal grandmother; Breast cancer in her mother; Diabetes in her mother, son, and unknown relative; Heart attack in her mother; Heart disease in her mother; Hypertension in her mother and sister; Lupus in her sister; No Known Problems in her daughter; Sleep apnea in her sister; Stroke in her maternal aunt and sister.    Objective:          BP (!) 192/98 (BP Location: Left arm, Patient Position: Sitting, BP Method: Pediatric (Automatic))   Pulse 75   Ht 5' 3" (1.6 m)   Wt 85.6 kg (188 lb 11.4 oz)   LMP  (LMP Unknown)   BMI 33.43 kg/m²       Physical Exam  Vitals signs reviewed.   Constitutional:       General: She is not in acute distress.     Appearance: She is well-developed. She is obese. She is not ill-appearing, toxic-appearing or diaphoretic.      Comments: Slightly debilitated middle-aged lady sitting in wheelchair    HENT:      Head: Normocephalic and atraumatic.   Eyes:      General: No scleral icterus.     Extraocular Movements: Extraocular movements intact.      Conjunctiva/sclera: Conjunctivae normal.      Pupils: Pupils are equal, round, and reactive to light.   Neck:      Musculoskeletal: Normal range of motion and neck supple. No muscular tenderness.      Thyroid: No thyromegaly.      Vascular: No carotid bruit or JVD.      Trachea: No tracheal " deviation.   Cardiovascular:      Rate and Rhythm: Normal rate and regular rhythm.      Chest Wall: PMI is not displaced. No thrill.      Pulses:           Carotid pulses are 2+ on the right side and 2+ on the left side.       Radial pulses are 1+ on the right side and 1+ on the left side.        Dorsalis pedis pulses are 1+ on the right side and 1+ on the left side.        Posterior tibial pulses are 1+ on the right side and 1+ on the left side.      Heart sounds: S1 normal and S2 normal. No murmur. No friction rub. No gallop.       Comments: Bilateral AV fistula scar;  auscultated thrill to right-side upper forearm    Pulmonary:      Effort: Pulmonary effort is normal. No respiratory distress.      Breath sounds: Normal breath sounds. No stridor. No wheezing, rhonchi or rales.   Chest:      Chest wall: No tenderness.   Abdominal:      General: Bowel sounds are normal. There is no distension or abdominal bruit.      Palpations: Abdomen is soft.      Tenderness: There is no abdominal tenderness. There is no guarding.   Musculoskeletal:      Right lower leg: No edema.      Left lower leg: No edema.      Comments: Left-sided residual deficits      Lymphadenopathy:      Cervical: No cervical adenopathy.   Skin:     General: Skin is warm and dry.      Findings: No rash.   Neurological:      Mental Status: She is alert and oriented to person, place, and time.   Psychiatric:         Behavior: Behavior normal.         Thought Content: Thought content normal.         Judgment: Judgment normal.         EKG:  My independent visualization of most recent EKG is SR with first degree AVB        Lab Results   Component Value Date     06/13/2020    K 5.5 (H) 06/13/2020     06/13/2020    CO2 22 (L) 06/13/2020    BUN 90 (H) 06/13/2020    CREATININE 10.2 (H) 06/13/2020     (H) 06/13/2020    HGBA1C 7.5 (H) 03/13/2020    MG 2.6 12/04/2019    AST 7 (L) 06/13/2020    ALT <5 (L) 06/13/2020    ALBUMIN 3.3 (L) 06/13/2020     PROT 7.4 06/13/2020    BILITOT 0.4 06/13/2020     (H) 08/09/2018    WBC 5.28 06/13/2020    HGB 10.4 (L) 06/13/2020    HCT 34.3 (L) 06/13/2020    HCT 36 10/07/2019     (H) 06/13/2020     06/13/2020    INR 1.0 06/13/2020    TSH 0.909 03/13/2020    CHOL 134 03/13/2020    HDL 50 03/13/2020    LDLCALC 62.6 (L) 03/13/2020    TRIG 107 03/13/2020         Reviewed  I have reviewed the following in detail:  [x]  Stress test   []  Angiography   [x]  Echocardiogram   [x]  Labs   [x]  Other:  Old records           Assessment/Plan:     Lucy Vanessaronnellpam was seen today for Bradycardia (x 1 weeks) and Dizziness    Episode of dizziness with bradycardia-in setting of home dialysis  -continue daily BP/HR monitoring  -notify clinic if any further episodes      Coronary angioplasty status  -Unclear whether she actually had any coronary stents, could not find record here in the last 10-15 years did have vertebral artery stent 20 years ago.  -on DAPT, no bleeding episodes      Essential hypertension-elevated in clinic  -Will defer to Nephrology   -Continue No salt/renal  diet   -Continue daily home BP checks and follow-up with Nephrology for BP management, family will notify MD of elevated reading today in office      Type 2 diabetes mellitus with chronic kidney disease on chronic dialysis, with long-term current use of insulin and obesity-A1C currently 7.5  -HgbA1C goal <7.0  -Follow-up with PCP for continued DM management  -Diet with low white carbohydrates       ESRD (end stage renal disease) on dialysis    THERESA (obstructive sleep apnea)  -obtaining CPAP this week  -Discussed importance of CPAP nightly    History of stroke    Atherosclerosis of aorta  -asymptomatic, stable  - continue current medications-statin and ASA   -LDL at goal; maintain LDL <70  -Optimize BP control      Dyslipidemia, LDL 62  -continue High intensity statin  -LDL goal <70    Bilateral carotid artery stenosis-mild per 2016 U/S, no  bruits, no sxs  -continue statin      This patient was discussed with PARESH Alarcon, FNP-BC   6/29/2020 10:19 AM    Unless there are intervening problems, patient should return for re-evaluation in Follow up in about 6 months (around 12/29/2020).    Follow-up:     Future Appointments   Date Time Provider Department Center   6/29/2020 11:00 AM Angelita Jurado NP [facility] CARDIO [facility]   7/8/2020  1:45 PM Fior Ricks DPM [facility] POD [facility]   8/19/2020  9:00 AM LAB, [facility] LAB [facility]   8/22/2020  8:30 AM Beverly Muniz MD [facility] IM [facility]   8/28/2020 11:30 AM PARESH Lacy, MARISELA [facility]

## 2020-06-30 PROBLEM — R42 EPISODE OF DIZZINESS: Status: ACTIVE | Noted: 2020-06-30

## 2020-06-30 PROBLEM — R00.1 BRADYCARDIA: Status: ACTIVE | Noted: 2020-06-30

## 2020-07-08 ENCOUNTER — OFFICE VISIT (OUTPATIENT)
Dept: PODIATRY | Facility: CLINIC | Age: 60
End: 2020-07-08
Attending: INTERNAL MEDICINE
Payer: MEDICARE

## 2020-07-08 VITALS — RESPIRATION RATE: 18 BRPM | WEIGHT: 188 LBS | HEIGHT: 63 IN | BODY MASS INDEX: 33.31 KG/M2

## 2020-07-08 DIAGNOSIS — L60.9 DISEASE OF NAIL: ICD-10-CM

## 2020-07-08 DIAGNOSIS — E11.49 TYPE II DIABETES MELLITUS WITH NEUROLOGICAL MANIFESTATIONS: Primary | ICD-10-CM

## 2020-07-08 PROCEDURE — 99999 PR PBB SHADOW E&M-EST. PATIENT-LVL IV: ICD-10-PCS | Mod: PBBFAC,HCNC,, | Performed by: PODIATRIST

## 2020-07-08 PROCEDURE — 99499 NO LOS: ICD-10-PCS | Mod: HCNC,S$GLB,, | Performed by: PODIATRIST

## 2020-07-08 PROCEDURE — 99999 PR PBB SHADOW E&M-EST. PATIENT-LVL IV: CPT | Mod: PBBFAC,HCNC,, | Performed by: PODIATRIST

## 2020-07-08 PROCEDURE — 99499 UNLISTED E&M SERVICE: CPT | Mod: HCNC,S$GLB,, | Performed by: PODIATRIST

## 2020-07-08 PROCEDURE — 11721 PR DEBRIDEMENT OF NAILS, 6 OR MORE: ICD-10-PCS | Mod: Q9,HCNC,S$GLB, | Performed by: PODIATRIST

## 2020-07-08 PROCEDURE — 11721 DEBRIDE NAIL 6 OR MORE: CPT | Mod: Q9,HCNC,S$GLB, | Performed by: PODIATRIST

## 2020-07-08 NOTE — PROGRESS NOTES
Subjective:      Patient ID: Lucy Perez is a 59 y.o. female.    Chief Complaint: PCP (Beverly Muniz, 4/29/20), Diabetic Foot Exam, and Nail Care    Lucy is a 59 y.o. female who presents to the clinic for evaluation and treatment of high risk feet. Lucy has a past medical history of Anemia of chronic disease (6/10/2017), Anxiety, Arthritis of right acromioclavicular joint (7/2/2014), Asthma, Bipolar disorder, Bronchitis, acute, Cataract, Cholelithiasis, Chronic diastolic CHF (congestive heart failure), 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 (10/22/2016), Cortical cataract of both eyes (7/26/2016), Decubitus ulcer of buttock, stage 2, Degeneration of lumbar or lumbosacral intervertebral disc (3/5/2013), Depression, Encounter for blood transfusion, ESRD on hemodialysis, General anesthetics causing adverse effect in therapeutic use, Hemorrhagic cerebrovascular accident (CVA), History of stroke (6/28/2017), Hypertensive retinopathy of both eyes (7/26/2016), Impingement syndrome of right shoulder (7/2/2014), Obesity, THERESA (obstructive sleep apnea) (3/5/2013), Partial symptomatic epilepsy with complex partial seizures, not intractable, without status epilepticus, Rheumatoid arthritis(714.0), Rotator cuff tear (7/2/2014), Sarcoidosis, Stroke (2016), and Vertebral artery stenosis (3/5/2013). The patient's chief complaint is long, thick toenails. This patient has documented high risk feet requiring routine maintenance secondary to diabetes mellitis and those secondary complications of diabetes, as mentioned..    PCP: Beverly Muniz MD    Date Last Seen by PCP:   Chief Complaint   Patient presents with    PCP     Beverly Muniz, 4/29/20    Diabetic Foot Exam    Nail Care         Hemoglobin A1C   Date Value Ref Range Status   03/13/2020 7.5 (H) 4.0 - 5.6 % Final     Comment:     ADA Screening Guidelines:  5.7-6.4%  Consistent with prediabetes  >or=6.5%  Consistent with  "diabetes  High levels of fetal hemoglobin interfere with the HbA1C  assay. Heterozygous hemoglobin variants (HbS, HgC, etc)do  not significantly interfere with this assay.   However, presence of multiple variants may affect accuracy.     12/04/2019 7.8 (H) 4.0 - 5.6 % Final     Comment:     ADA Screening Guidelines:  5.7-6.4%  Consistent with prediabetes  >or=6.5%  Consistent with diabetes  High levels of fetal hemoglobin interfere with the HbA1C  assay. Heterozygous hemoglobin variants (HbS, HgC, etc)do  not significantly interfere with this assay.   However, presence of multiple variants may affect accuracy.     08/29/2019 6.8 (H) 4.0 - 5.6 % Final     Comment:     ADA Screening Guidelines:  5.7-6.4%  Consistent with prediabetes  >or=6.5%  Consistent with diabetes  High levels of fetal hemoglobin interfere with the HbA1C  assay. Heterozygous hemoglobin variants (HbS, HgC, etc)do  not significantly interfere with this assay.   However, presence of multiple variants may affect accuracy.         Review of Systems   Constitution: Negative for chills, decreased appetite and fever.   Cardiovascular: Negative for leg swelling.   Skin: Positive for dry skin and nail changes. Negative for color change, flushing and itching.   Musculoskeletal: Positive for muscle weakness (L sided). Negative for arthritis, joint pain, joint swelling and myalgias.   Gastrointestinal: Negative for nausea and vomiting.   Neurological: Positive for focal weakness, loss of balance and weakness. Negative for numbness and paresthesias.           Objective:       Vitals:    07/08/20 1343   Resp: 18   Weight: 85.3 kg (188 lb)   Height: 5' 3" (1.6 m)   PainSc: 0-No pain        Physical Exam   Constitutional: She is oriented to person, place, and time. She appears well-developed and well-nourished.   Cardiovascular:   Dorsalis pedis and posterior tibial pulses are diminished Bilaterally. Toes are cool to touch. Feet are warm proximally.There is " decreased digital hair. Skin is atrophic, slightly hyperpigmented, and mildly edematous       Musculoskeletal: Normal range of motion. She exhibits no edema or tenderness.   Adequate joint range of motion without pain, limitation, nor crepitation Bilateral feet and ankle joints. Muscle strength is 5/5 in all groups bilaterally.         Neurological: She is alert and oriented to person, place, and time.   Prather-Luz 5.07 monofilamant testing is diminished Yovani feet. Sharp/dull sensation diminished Bilaterally. Light touch absent Bilaterally.       Skin: Skin is warm, dry and intact. No abrasion, no bruising, no ecchymosis and no lesion noted. No erythema.   Nails x10 are elongated by  2-6mm's, thickened by 2-6 mm's, dystrophic, and are darkened in  coloration . Xerosis Bilaterally. No open lesions noted.       Psychiatric: She has a normal mood and affect. Her behavior is normal.   Vitals reviewed.            Assessment:       Encounter Diagnoses   Name Primary?    Type II diabetes mellitus with neurological manifestations Yes    Disease of nail          Plan:       Lucy was seen today for pcp, diabetic foot exam and nail care.    Diagnoses and all orders for this visit:    Type II diabetes mellitus with neurological manifestations    Disease of nail      I counseled the patient on her conditions, their implications and medical management.        - Shoe inspection. Diabetic Foot Education. Patient reminded of the importance of good nutrition and blood sugar control to help prevent podiatric complications of diabetes. Patient instructed on proper foot hygeine. We discussed wearing proper shoe gear, daily foot inspections, never walking without protective shoe gear, never putting sharp instruments to feet, routine podiatric nail visits every 2-3 months.      - With patient's permission, nails were aggressively reduced and debrided x 10 to their soft tissue attachment mechanically and with electric ,  removing all offending nail and debris. Patient relates relief following the procedure. She will continue to monitor the areas daily, inspect her feet, wear protective shoe gear when ambulatory, moisturizer to maintain skin integrity and follow in this office in approximately 2-3 months, sooner p.r.n.

## 2020-07-27 ENCOUNTER — HOSPITAL ENCOUNTER (INPATIENT)
Facility: HOSPITAL | Age: 60
LOS: 3 days | Discharge: HOME | DRG: 189 | End: 2020-07-30
Attending: EMERGENCY MEDICINE | Admitting: INTERNAL MEDICINE
Payer: MEDICARE

## 2020-07-27 DIAGNOSIS — I49.9 ARRHYTHMIA: ICD-10-CM

## 2020-07-27 DIAGNOSIS — R07.9 CHEST PAIN: ICD-10-CM

## 2020-07-27 DIAGNOSIS — R09.02 HYPOXEMIA: Primary | ICD-10-CM

## 2020-07-27 DIAGNOSIS — Z99.2 ESRD ON PERITONEAL DIALYSIS: ICD-10-CM

## 2020-07-27 DIAGNOSIS — J81.0 ACUTE PULMONARY EDEMA: ICD-10-CM

## 2020-07-27 DIAGNOSIS — N18.6 ESRD ON PERITONEAL DIALYSIS: ICD-10-CM

## 2020-07-27 DIAGNOSIS — E87.70 HYPERVOLEMIA, UNSPECIFIED HYPERVOLEMIA TYPE: ICD-10-CM

## 2020-07-27 DIAGNOSIS — R79.89 ELEVATED TROPONIN: ICD-10-CM

## 2020-07-27 DIAGNOSIS — R06.02 SOB (SHORTNESS OF BREATH): ICD-10-CM

## 2020-07-27 DIAGNOSIS — J96.01 ACUTE RESPIRATORY FAILURE WITH HYPOXIA: ICD-10-CM

## 2020-07-27 LAB
ALBUMIN SERPL BCP-MCNC: 3.7 G/DL (ref 3.5–5.2)
ALP SERPL-CCNC: 110 U/L (ref 55–135)
ALT SERPL W/O P-5'-P-CCNC: 5 U/L (ref 10–44)
ANION GAP SERPL CALC-SCNC: 21 MMOL/L (ref 8–16)
APPEARANCE FLD: NORMAL
AST SERPL-CCNC: 10 U/L (ref 10–40)
BACTERIA #/AREA URNS AUTO: ABNORMAL /HPF
BASOPHILS # BLD AUTO: 0.04 K/UL (ref 0–0.2)
BASOPHILS NFR BLD: 0.5 % (ref 0–1.9)
BILIRUB SERPL-MCNC: 0.5 MG/DL (ref 0.1–1)
BILIRUB UR QL STRIP: NEGATIVE
BNP SERPL-MCNC: 91 PG/ML (ref 0–99)
BODY FLD TYPE: NORMAL
BUN SERPL-MCNC: 78 MG/DL (ref 8–23)
CALCIUM SERPL-MCNC: 9 MG/DL (ref 8.7–10.5)
CHLORIDE SERPL-SCNC: 105 MMOL/L (ref 95–110)
CLARITY UR REFRACT.AUTO: CLEAR
CO2 SERPL-SCNC: 20 MMOL/L (ref 23–29)
COLOR FLD: YELLOW
COLOR UR AUTO: YELLOW
CREAT SERPL-MCNC: 11.7 MG/DL (ref 0.5–1.4)
DIFFERENTIAL METHOD: ABNORMAL
EOSINOPHIL # BLD AUTO: 0.2 K/UL (ref 0–0.5)
EOSINOPHIL NFR BLD: 2.5 % (ref 0–8)
EOSINOPHIL NFR FLD MANUAL: 1 %
ERYTHROCYTE [DISTWIDTH] IN BLOOD BY AUTOMATED COUNT: 14.4 % (ref 11.5–14.5)
EST. GFR  (AFRICAN AMERICAN): 3.6 ML/MIN/1.73 M^2
EST. GFR  (NON AFRICAN AMERICAN): 3.1 ML/MIN/1.73 M^2
GLUCOSE SERPL-MCNC: 146 MG/DL (ref 70–110)
GLUCOSE SERPL-MCNC: 150 MG/DL (ref 70–110)
GLUCOSE UR QL STRIP: ABNORMAL
GRAM STN SPEC: NORMAL
GRAM STN SPEC: NORMAL
HCT VFR BLD AUTO: 36.1 % (ref 37–48.5)
HGB BLD-MCNC: 10.8 G/DL (ref 12–16)
HGB UR QL STRIP: NEGATIVE
HYALINE CASTS UR QL AUTO: 0 /LPF
IMM GRANULOCYTES # BLD AUTO: 0.04 K/UL (ref 0–0.04)
IMM GRANULOCYTES NFR BLD AUTO: 0.5 % (ref 0–0.5)
KETONES UR QL STRIP: NEGATIVE
LACTATE SERPL-SCNC: 0.9 MMOL/L (ref 0.5–2.2)
LEUKOCYTE ESTERASE UR QL STRIP: NEGATIVE
LIPASE SERPL-CCNC: 35 U/L (ref 4–60)
LYMPHOCYTES # BLD AUTO: 1.2 K/UL (ref 1–4.8)
LYMPHOCYTES NFR BLD: 14.1 % (ref 18–48)
LYMPHOCYTES NFR FLD MANUAL: 31 %
MAGNESIUM SERPL-MCNC: 3 MG/DL (ref 1.6–2.6)
MCH RBC QN AUTO: 30.9 PG (ref 27–31)
MCHC RBC AUTO-ENTMCNC: 29.9 G/DL (ref 32–36)
MCV RBC AUTO: 103 FL (ref 82–98)
MESOTHL CELL NFR FLD MANUAL: 2 %
MICROSCOPIC COMMENT: ABNORMAL
MONOCYTES # BLD AUTO: 0.6 K/UL (ref 0.3–1)
MONOCYTES NFR BLD: 7.4 % (ref 4–15)
MONOS+MACROS NFR FLD MANUAL: 61 %
NEUTROPHILS # BLD AUTO: 6.3 K/UL (ref 1.8–7.7)
NEUTROPHILS NFR BLD: 75 % (ref 38–73)
NEUTROPHILS NFR FLD MANUAL: 5 %
NITRITE UR QL STRIP: NEGATIVE
NRBC BLD-RTO: 0 /100 WBC
PH UR STRIP: 5 [PH] (ref 5–8)
PHOSPHATE SERPL-MCNC: 6.4 MG/DL (ref 2.7–4.5)
PLATELET # BLD AUTO: 220 K/UL (ref 150–350)
PMV BLD AUTO: 10.1 FL (ref 9.2–12.9)
POCT GLUCOSE: 150 MG/DL (ref 70–110)
POCT GLUCOSE: 193 MG/DL (ref 70–110)
POCT GLUCOSE: 207 MG/DL (ref 70–110)
POTASSIUM SERPL-SCNC: 4.5 MMOL/L (ref 3.5–5.1)
PROT SERPL-MCNC: 8.1 G/DL (ref 6–8.4)
PROT UR QL STRIP: ABNORMAL
RBC # BLD AUTO: 3.5 M/UL (ref 4–5.4)
RBC #/AREA URNS AUTO: 2 /HPF (ref 0–4)
SARS-COV-2 RDRP RESP QL NAA+PROBE: NEGATIVE
SODIUM SERPL-SCNC: 146 MMOL/L (ref 136–145)
SP GR UR STRIP: 1.01 (ref 1–1.03)
SQUAMOUS #/AREA URNS AUTO: 1 /HPF
TROPONIN I SERPL DL<=0.01 NG/ML-MCNC: 0.05 NG/ML (ref 0–0.03)
TROPONIN I SERPL DL<=0.01 NG/ML-MCNC: 0.05 NG/ML (ref 0–0.03)
URN SPEC COLLECT METH UR: ABNORMAL
WBC # BLD AUTO: 8.36 K/UL (ref 3.9–12.7)
WBC # FLD: 283 /CU MM
WBC #/AREA URNS AUTO: 4 /HPF (ref 0–5)

## 2020-07-27 PROCEDURE — 25000003 PHARM REV CODE 250: Mod: HCNC | Performed by: PHYSICIAN ASSISTANT

## 2020-07-27 PROCEDURE — 83880 ASSAY OF NATRIURETIC PEPTIDE: CPT | Mod: HCNC

## 2020-07-27 PROCEDURE — 11000001 HC ACUTE MED/SURG PRIVATE ROOM: Mod: HCNC

## 2020-07-27 PROCEDURE — G0378 HOSPITAL OBSERVATION PER HR: HCPCS | Mod: HCNC

## 2020-07-27 PROCEDURE — 96372 THER/PROPH/DIAG INJ SC/IM: CPT | Mod: 59

## 2020-07-27 PROCEDURE — 84484 ASSAY OF TROPONIN QUANT: CPT | Mod: HCNC

## 2020-07-27 PROCEDURE — 63600175 PHARM REV CODE 636 W HCPCS: Mod: HCNC | Performed by: PHYSICIAN ASSISTANT

## 2020-07-27 PROCEDURE — C9399 UNCLASSIFIED DRUGS OR BIOLOG: HCPCS | Mod: HCNC | Performed by: PHYSICIAN ASSISTANT

## 2020-07-27 PROCEDURE — 99215 OFFICE O/P EST HI 40 MIN: CPT | Mod: HCNC,GC,, | Performed by: INTERNAL MEDICINE

## 2020-07-27 PROCEDURE — 94761 N-INVAS EAR/PLS OXIMETRY MLT: CPT | Mod: HCNC

## 2020-07-27 PROCEDURE — 82962 GLUCOSE BLOOD TEST: CPT | Mod: HCNC

## 2020-07-27 PROCEDURE — 87205 SMEAR GRAM STAIN: CPT | Mod: HCNC

## 2020-07-27 PROCEDURE — 96374 THER/PROPH/DIAG INJ IV PUSH: CPT

## 2020-07-27 PROCEDURE — 25000003 PHARM REV CODE 250: Mod: HCNC

## 2020-07-27 PROCEDURE — 80053 COMPREHEN METABOLIC PANEL: CPT | Mod: HCNC

## 2020-07-27 PROCEDURE — 99220 PR INITIAL OBSERVATION CARE,LEVL III: ICD-10-PCS | Mod: HCNC,,, | Performed by: PHYSICIAN ASSISTANT

## 2020-07-27 PROCEDURE — 99220 PR INITIAL OBSERVATION CARE,LEVL III: CPT | Mod: HCNC,,, | Performed by: PHYSICIAN ASSISTANT

## 2020-07-27 PROCEDURE — 99284 PR EMERGENCY DEPT VISIT,LEVEL IV: ICD-10-PCS | Mod: HCNC,,, | Performed by: EMERGENCY MEDICINE

## 2020-07-27 PROCEDURE — 84484 ASSAY OF TROPONIN QUANT: CPT | Mod: 91,HCNC

## 2020-07-27 PROCEDURE — 99284 EMERGENCY DEPT VISIT MOD MDM: CPT | Mod: HCNC,,, | Performed by: EMERGENCY MEDICINE

## 2020-07-27 PROCEDURE — 99285 EMERGENCY DEPT VISIT HI MDM: CPT | Mod: 25,HCNC

## 2020-07-27 PROCEDURE — 93005 ELECTROCARDIOGRAM TRACING: CPT | Mod: HCNC

## 2020-07-27 PROCEDURE — 87070 CULTURE OTHR SPECIMN AEROBIC: CPT | Mod: HCNC

## 2020-07-27 PROCEDURE — 89051 BODY FLUID CELL COUNT: CPT | Mod: HCNC

## 2020-07-27 PROCEDURE — 93010 EKG 12-LEAD: ICD-10-PCS | Mod: HCNC,,, | Performed by: INTERNAL MEDICINE

## 2020-07-27 PROCEDURE — 83690 ASSAY OF LIPASE: CPT | Mod: HCNC

## 2020-07-27 PROCEDURE — 83605 ASSAY OF LACTIC ACID: CPT | Mod: HCNC

## 2020-07-27 PROCEDURE — U0002 COVID-19 LAB TEST NON-CDC: HCPCS | Mod: HCNC

## 2020-07-27 PROCEDURE — 81001 URINALYSIS AUTO W/SCOPE: CPT | Mod: HCNC

## 2020-07-27 PROCEDURE — 84100 ASSAY OF PHOSPHORUS: CPT | Mod: HCNC

## 2020-07-27 PROCEDURE — 27200950 HC CAPD SUPPORT: Mod: HCNC

## 2020-07-27 PROCEDURE — 99215 PR OFFICE/OUTPT VISIT, EST, LEVL V, 40-54 MIN: ICD-10-PCS | Mod: HCNC,GC,, | Performed by: INTERNAL MEDICINE

## 2020-07-27 PROCEDURE — 27000221 HC OXYGEN, UP TO 24 HOURS: Mod: HCNC

## 2020-07-27 PROCEDURE — 83735 ASSAY OF MAGNESIUM: CPT | Mod: HCNC

## 2020-07-27 PROCEDURE — 85025 COMPLETE CBC W/AUTO DIFF WBC: CPT | Mod: HCNC

## 2020-07-27 PROCEDURE — 93010 ELECTROCARDIOGRAM REPORT: CPT | Mod: HCNC,,, | Performed by: INTERNAL MEDICINE

## 2020-07-27 RX ORDER — TORSEMIDE 20 MG/1
20 TABLET ORAL DAILY
COMMUNITY

## 2020-07-27 RX ORDER — ACETAMINOPHEN 500 MG
TABLET ORAL
Status: COMPLETED
Start: 2020-07-27 | End: 2020-07-27

## 2020-07-27 RX ORDER — FUROSEMIDE 10 MG/ML
100 INJECTION INTRAMUSCULAR; INTRAVENOUS
Status: COMPLETED | OUTPATIENT
Start: 2020-07-27 | End: 2020-07-27

## 2020-07-27 RX ORDER — IBUPROFEN 200 MG
16 TABLET ORAL
Status: DISCONTINUED | OUTPATIENT
Start: 2020-07-27 | End: 2020-07-30 | Stop reason: HOSPADM

## 2020-07-27 RX ORDER — ASPIRIN 81 MG/1
81 TABLET ORAL EVERY MORNING
Status: DISCONTINUED | OUTPATIENT
Start: 2020-07-27 | End: 2020-07-30 | Stop reason: HOSPADM

## 2020-07-27 RX ORDER — POLYETHYLENE GLYCOL 3350 17 G/17G
17 POWDER, FOR SOLUTION ORAL 2 TIMES DAILY
Status: DISCONTINUED | OUTPATIENT
Start: 2020-07-27 | End: 2020-07-30 | Stop reason: HOSPADM

## 2020-07-27 RX ORDER — SEVELAMER CARBONATE 800 MG/1
800 TABLET, FILM COATED ORAL
Status: DISCONTINUED | OUTPATIENT
Start: 2020-07-27 | End: 2020-07-30 | Stop reason: HOSPADM

## 2020-07-27 RX ORDER — IBUPROFEN 200 MG
24 TABLET ORAL
Status: DISCONTINUED | OUTPATIENT
Start: 2020-07-27 | End: 2020-07-30 | Stop reason: HOSPADM

## 2020-07-27 RX ORDER — LOSARTAN POTASSIUM 50 MG/1
50 TABLET ORAL DAILY
COMMUNITY

## 2020-07-27 RX ORDER — LEVETIRACETAM 500 MG/1
1000 TABLET ORAL DAILY
Status: DISCONTINUED | OUTPATIENT
Start: 2020-07-27 | End: 2020-07-30 | Stop reason: HOSPADM

## 2020-07-27 RX ORDER — TALC
6 POWDER (GRAM) TOPICAL NIGHTLY PRN
Status: DISCONTINUED | OUTPATIENT
Start: 2020-07-27 | End: 2020-07-30 | Stop reason: HOSPADM

## 2020-07-27 RX ORDER — ACETAMINOPHEN 500 MG
1000 TABLET ORAL
Status: COMPLETED | OUTPATIENT
Start: 2020-07-27 | End: 2020-07-27

## 2020-07-27 RX ORDER — GLUCAGON 1 MG
1 KIT INJECTION
Status: DISCONTINUED | OUTPATIENT
Start: 2020-07-27 | End: 2020-07-30 | Stop reason: HOSPADM

## 2020-07-27 RX ORDER — BISACODYL 10 MG
10 SUPPOSITORY, RECTAL RECTAL DAILY PRN
Status: DISCONTINUED | OUTPATIENT
Start: 2020-07-27 | End: 2020-07-30 | Stop reason: HOSPADM

## 2020-07-27 RX ORDER — LEVETIRACETAM 500 MG/1
500 TABLET ORAL
Status: DISCONTINUED | OUTPATIENT
Start: 2020-07-28 | End: 2020-07-30 | Stop reason: HOSPADM

## 2020-07-27 RX ORDER — FLUOXETINE HYDROCHLORIDE 20 MG/1
20 CAPSULE ORAL DAILY
Status: DISCONTINUED | OUTPATIENT
Start: 2020-07-27 | End: 2020-07-30 | Stop reason: HOSPADM

## 2020-07-27 RX ORDER — SODIUM CHLORIDE 0.9 % (FLUSH) 0.9 %
10 SYRINGE (ML) INJECTION
Status: DISCONTINUED | OUTPATIENT
Start: 2020-07-27 | End: 2020-07-30 | Stop reason: HOSPADM

## 2020-07-27 RX ORDER — INSULIN ASPART 100 [IU]/ML
0-5 INJECTION, SOLUTION INTRAVENOUS; SUBCUTANEOUS
Status: DISCONTINUED | OUTPATIENT
Start: 2020-07-27 | End: 2020-07-30 | Stop reason: HOSPADM

## 2020-07-27 RX ORDER — CLOPIDOGREL BISULFATE 75 MG/1
75 TABLET ORAL DAILY
Status: DISCONTINUED | OUTPATIENT
Start: 2020-07-27 | End: 2020-07-30 | Stop reason: HOSPADM

## 2020-07-27 RX ORDER — SODIUM CHLORIDE 0.9 % (FLUSH) 0.9 %
5 SYRINGE (ML) INJECTION
Status: DISCONTINUED | OUTPATIENT
Start: 2020-07-27 | End: 2020-07-30 | Stop reason: HOSPADM

## 2020-07-27 RX ORDER — ATORVASTATIN CALCIUM 40 MG/1
40 TABLET, FILM COATED ORAL DAILY
Status: DISCONTINUED | OUTPATIENT
Start: 2020-07-27 | End: 2020-07-30 | Stop reason: HOSPADM

## 2020-07-27 RX ORDER — POLYETHYLENE GLYCOL 3350 17 G/17G
17 POWDER, FOR SOLUTION ORAL DAILY
Status: DISCONTINUED | OUTPATIENT
Start: 2020-07-27 | End: 2020-07-27

## 2020-07-27 RX ORDER — ACETAMINOPHEN 325 MG/1
650 TABLET ORAL EVERY 4 HOURS PRN
Status: DISCONTINUED | OUTPATIENT
Start: 2020-07-27 | End: 2020-07-30 | Stop reason: HOSPADM

## 2020-07-27 RX ORDER — IPRATROPIUM BROMIDE AND ALBUTEROL SULFATE 2.5; .5 MG/3ML; MG/3ML
3 SOLUTION RESPIRATORY (INHALATION) EVERY 4 HOURS PRN
Status: DISCONTINUED | OUTPATIENT
Start: 2020-07-27 | End: 2020-07-30 | Stop reason: HOSPADM

## 2020-07-27 RX ORDER — TORSEMIDE 20 MG/1
20 TABLET ORAL DAILY
Status: DISCONTINUED | OUTPATIENT
Start: 2020-07-27 | End: 2020-07-30 | Stop reason: HOSPADM

## 2020-07-27 RX ORDER — LIDOCAINE 50 MG/G
1 PATCH TOPICAL
Status: DISCONTINUED | OUTPATIENT
Start: 2020-07-27 | End: 2020-07-30 | Stop reason: HOSPADM

## 2020-07-27 RX ORDER — LOSARTAN POTASSIUM 50 MG/1
50 TABLET ORAL DAILY
Status: DISCONTINUED | OUTPATIENT
Start: 2020-07-27 | End: 2020-07-30 | Stop reason: HOSPADM

## 2020-07-27 RX ORDER — ONDANSETRON 8 MG/1
8 TABLET, ORALLY DISINTEGRATING ORAL EVERY 8 HOURS PRN
Status: DISCONTINUED | OUTPATIENT
Start: 2020-07-27 | End: 2020-07-30 | Stop reason: HOSPADM

## 2020-07-27 RX ADMIN — CLOPIDOGREL 75 MG: 75 TABLET, FILM COATED ORAL at 03:07

## 2020-07-27 RX ADMIN — INSULIN ASPART 2 UNITS: 100 INJECTION, SOLUTION INTRAVENOUS; SUBCUTANEOUS at 04:07

## 2020-07-27 RX ADMIN — SEVELAMER CARBONATE 800 MG: 800 TABLET, FILM COATED ORAL at 04:07

## 2020-07-27 RX ADMIN — ATORVASTATIN CALCIUM 40 MG: 40 TABLET, FILM COATED ORAL at 03:07

## 2020-07-27 RX ADMIN — FLUOXETINE 20 MG: 20 CAPSULE ORAL at 03:07

## 2020-07-27 RX ADMIN — TORSEMIDE 20 MG: 20 TABLET ORAL at 03:07

## 2020-07-27 RX ADMIN — FUROSEMIDE 100 MG: 10 INJECTION, SOLUTION INTRAMUSCULAR; INTRAVENOUS at 05:07

## 2020-07-27 RX ADMIN — ASPIRIN 81 MG: 81 TABLET, COATED ORAL at 03:07

## 2020-07-27 RX ADMIN — LOSARTAN POTASSIUM 50 MG: 50 TABLET ORAL at 03:07

## 2020-07-27 RX ADMIN — ACETAMINOPHEN 1000 MG: 500 TABLET ORAL at 02:07

## 2020-07-27 RX ADMIN — POLYETHYLENE GLYCOL 3350 17 G: 17 POWDER, FOR SOLUTION ORAL at 09:07

## 2020-07-27 RX ADMIN — ACETAMINOPHEN 650 MG: 325 TABLET ORAL at 12:07

## 2020-07-27 RX ADMIN — LIDOCAINE 1 PATCH: 50 PATCH TOPICAL at 12:07

## 2020-07-27 RX ADMIN — POLYETHYLENE GLYCOL 3350 17 G: 17 POWDER, FOR SOLUTION ORAL at 10:07

## 2020-07-27 RX ADMIN — INSULIN DETEMIR 5 UNITS: 100 INJECTION, SOLUTION SUBCUTANEOUS at 09:07

## 2020-07-27 RX ADMIN — LEVETIRACETAM 1000 MG: 500 TABLET ORAL at 03:07

## 2020-07-27 RX ADMIN — Medication 1000 MG: at 02:07

## 2020-07-27 NOTE — SUBJECTIVE & OBJECTIVE
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    Cortical cataract of both eyes 7/26/2016    Decubitus ulcer of buttock, stage 2     Degeneration of lumbar or lumbosacral intervertebral disc 3/5/2013    S/p MRI L-spine 5/2009     Depression     Encounter for blood transfusion     ESRD on hemodialysis     Started August 2018    General anesthetics causing adverse effect in therapeutic use     Hemorrhagic cerebrovascular accident (CVA)     8[place] [facility] [place]    History of stroke 6/28/2017    s/p R-MCA stroke with R-putaminal hemorrhagic transformation in 8/2016 and 11/2016 ([place] [facility] [place] with residual L hemiparesis, on AED s/p CVA      Hypertensive retinopathy of both eyes 7/26/2016    Impingement syndrome of right shoulder 7/2/2014    Obesity     THERESA (obstructive sleep apnea) 3/5/2013    No Home CPAP 2ndary to cost     Partial symptomatic epilepsy with complex partial seizures, not intractable, without status epilepticus     Rheumatoid arthritis(714.0)     Rotator cuff tear 7/2/2014    Sarcoidosis     Stroke 2016    left sided flaccidity, SAH    Vertebral artery stenosis 3/5/2013    S/p Stenting per Dr Burnett        Past Surgical History:   Procedure Laterality Date    BREAST SURGERY      breast reduction    COLONOSCOPY N/A 8/11/2016    Procedure: COLONOSCOPY;  Surgeon: Jerry Vilchis MD;  Location: [address]);  Service: Endoscopy;  Laterality: N/A;  Patient reports difficulty awaking from anesthesia in the past.    DECLOTTING OF VASCULAR GRAFT Right 6/20/2019    Procedure: DECLOT-GRAFT;  Surgeon: Cabrera IBARRA  MD Alida;  Location: [facility] CATH LAB;  Service: Cardiology;  Laterality: Right;    DECLOTTING OF VASCULAR GRAFT Right 12/6/2019    Procedure: DECLOT-GRAFT;  Surgeon: Cabrera Irwin MD;  Location: [address] FLR;  Service: Peripheral Vascular;  Laterality: Right;    DECLOTTING OF VASCULAR GRAFT N/A 6/10/2020    Procedure: Declotting, Vascular Graft;  Surgeon: SERENA Buchanan II, MD;  Location: [facility] CATH LAB;  Service: Vascular;  Laterality: N/A;    DIAGNOSTIC LAPAROSCOPY N/A 6/8/2020    Procedure: LAPAROSCOPY, DIAGNOSTIC (POSSIBLE PD CATH REVISION);  Surgeon: Paige Monsalve MD;  [address];  Service: General;  Laterality: N/A;    FISTULOGRAM Right 2/11/2019    Procedure: Fistulogram;  Surgeon: Meir Valencia MD;  Location: [facility] CATH LAB;  Service: Peripheral Vascular;  Laterality: Right;    FISTULOGRAM Right 7/8/2019    Procedure: Fistulogram;  Surgeon: WELLINGTON Palm III, MD;  Location: [facility] CATH LAB;  Service: Peripheral Vascular;  Laterality: Right;    FISTULOGRAM Right 12/6/2019    Procedure: Fistulogram;  Surgeon: Cabrera Irwin MD;  [address];  Service: Peripheral Vascular;  Laterality: Right;    FISTULOGRAM Right 3/12/2020    Procedure: FISTULOGRAM;  Surgeon: Meir Valencia MD;  Location: [facility] CATH LAB;  Service: Peripheral Vascular;  Laterality: Right;    HYSTERECTOMY  1999    JOSE LUIS/BSO (AUB)    LAPAROSCOPIC LYSIS OF ADHESIONS N/A 3/9/2020    Procedure: LYSIS, ADHESIONS, LAPAROSCOPIC;  Surgeon: Paige Monsalve MD;  Location: [facility] OR [facility];  Service: General;  Laterality: N/A;    LAPAROSCOPIC REVISION OF PROCEDURE INVOLVING PERITONEAL DIALYSIS CATHETER N/A 6/8/2020    Procedure: REVISION OF PROCEDURE INVOLVING PERITONEAL DIALYSIS CATHETER, LAPAROSCOPIC;  Surgeon: Paige Monsalve MD;  [facility];  Service: General;  Laterality: N/A;    PLACEMENT OF ARTERIOVENOUS GRAFT Right 10/18/2018    Procedure: AV  GRAFT CREATION;  Surgeon: Meir Valencia MD;  Location: [facility] OR [address] [place] [address];  Service: Cardiovascular;  Laterality: Right;    ROTATOR CUFF REPAIR Right July 9, 2014    right side    Skull surgery      Aneurysm    stent placed      in vertebral artery    TOTAL REDUCTION MAMMOPLASTY      TUBAL LIGATION         Review of patient's allergies indicates:   Allergen Reactions    Lisinopril Other (See Comments)     Angioedema      Vicodin [hydrocodone-acetaminophen] Rash     No problem with acetaminophen        No current facility-administered medications on file prior to encounter.      Current Outpatient Medications on File Prior to Encounter   Medication Sig    acetaminophen (TYLENOL) 325 MG tablet Take 2 tablets (650 mg total) by mouth every 6 (six) hours as needed for Pain.    aspirin (ECOTRIN) 81 MG EC tablet Take 81 mg by mouth every morning.     atorvastatin (LIPITOR) 40 MG tablet TAKE 1 TABLET ONE TIME DAILY FOR CHOLESTEROL    B,C/folic/zinc/selenometh/D3/E (RENAPLEX-D ORAL) Take by mouth.    bisacodyl (DULCOLAX) 10 mg Supp Place 1 suppository (10 mg total) rectally daily as needed.    diclofenac sodium (VOLTAREN) 1 % Gel Apply 2gm to toe or hip  2-3x/day as needed    ergocalciferol (ERGOCALCIFEROL) 50,000 unit Cap Take 1 capsule (50,000 Units total) by mouth every 7 days.    famotidine (PEPCID) 20 MG tablet Take 1 tablet (20 mg total) by mouth once daily.    FLUoxetine 20 MG capsule Take 1 capsule (20 mg total) by mouth once daily.    folic acid (FOLVITE) 1 MG tablet Take 1 tablet (1,000 mcg total) by mouth once daily.    glipiZIDE (GLUCOTROL) 2.5 MG TR24 Take 1 tablet (hold if less than 150) at lunch (non dialysis days)    insulin (LANTUS SOLOSTAR U-100 INSULIN) glargine 100 units/mL (3mL) SubQ pen Inject 12 units at night. (Patient taking differently: Inject 14 Units into the skin every evening. Inject 14 units at night.)    insulin aspart U-100 (NOVOLOG U-100 INSULIN ASPART) 100 unit/mL  "injection Use correction scale 180-230+1, 231-280+2, 281-330+3, 331-380+4, >380+5, max 15 units.    levETIRAcetam (KEPPRA) 1000 MG tablet Take 1 tablet (1,000 mg total) by mouth once daily. (Patient taking differently: Take 1,000 mg by mouth once daily. On non dialysis days)    levETIRAcetam (KEPPRA) 500 MG Tab Take 1 tablet (500 mg total) by mouth every Mon, Wed, Fri. Monday Wednesday and Friday after DIALYSIS take additional 500mg (Patient taking differently: Take 500 mg by mouth 2 (two) times daily. Twice daily on dialysis days)    losartan (COZAAR) 50 MG tablet Take 50 mg by mouth once daily.    oxyCODONE (OXY-IR) 5 mg Cap Take 1 capsule (5 mg total) by mouth every 6 (six) hours as needed for Pain.    polyethylene glycol (MIRALAX) 17 gram/dose powder Take 17 g by mouth 2 (two) times daily.    sevelamer carbonate (RENVELA) 800 mg Tab Take 800 mg by mouth 3 (three) times daily with meals.    TRUEPLUS LANCETS 33 gauge Misc     albuterol (VENTOLIN HFA) 90 mcg/actuation inhaler Inhale 2 puffs into the lungs every 6 (six) hours as needed for Wheezing. Rescue    BD INSULIN PEN NEEDLE UF SHORT 31 gauge x 5/16" Ndle USE TO INJECT NOVOLOG FLEXPEN BEFORE MEALS    blood sugar diagnostic Strp To check BG 4  times daily, to use with insurance preferred meter-true metrix    blood-glucose meter kit To check BG 4 times daily, to use with insurance preferred meter-true metrix    clopidogrel (PLAVIX) 75 mg tablet Take 1 tablet (75 mg total) by mouth once daily.    cyclobenzaprine (FLEXERIL) 5 MG tablet 1 tab Q8 hours as needed for back pain    dantrolene (DANTRIUM) 50 MG Cap Take one capsule three time daily for 1 week, then increase two tablet (Patient taking differently: Take 50 mg by mouth 3 (three) times daily. Take one capsule three time daily for 1 week, then increase two tablet)    lidocaine (LIDODERM) 5 % Place 1 patch onto the skin once daily. Remove & Discard patch within 12 hours or as directed by MD    " "LORazepam (ATIVAN) 0.5 MG tablet 1 tab 1 hour prior to Dialysis    ondansetron (ZOFRAN-ODT) 4 MG TbDL Take 1 tablet (4 mg total) by mouth every 8 (eight) hours as needed.    pen needle, diabetic (BD EMI 2ND GEN PEN NEEDLE) 32 gauge x 5/32" Ndle Uses once a day, 90 day    pregabalin (LYRICA) 25 MG capsule Take 1 capsule (25 mg total) by mouth once daily. May take additional dose after HD.    torsemide (DEMADEX) 20 MG Tab Take 20 mg by mouth once daily.    traMADoL (ULTRAM) 50 mg tablet Take 1 tablet (50 mg total) by mouth every 8 (eight) hours as needed for Pain. 1 tab every 8-12 hours as needed for pain     Family History     Problem Relation (Age of Onset)    Bell's palsy Sister    Blindness Maternal Grandmother    Breast cancer Mother    Diabetes Mother, Son,     Heart attack Mother    Heart disease Mother    Hypertension Mother, Sister    Lupus Sister    No Known Problems Daughter    Sleep apnea Sister    Stroke Sister, Maternal Aunt        Tobacco Use    Smoking status: Never Smoker    Smokeless tobacco: Never Used   Substance and Sexual Activity    Alcohol use: Yes     Frequency: Monthly or less     Drinks per session: 1 or 2     Binge frequency: Never     Comment: occasional wine cooler     Drug use: Never    Sexual activity: Yes     Partners: Male     Birth control/protection: Post-menopausal     Review of Systems   Constitutional: Negative for activity change, chills and fever.   HENT: Negative for trouble swallowing.    Eyes: Negative for photophobia and visual disturbance.   Respiratory: Positive for shortness of breath. Negative for cough and chest tightness.    Cardiovascular: Positive for chest pain. Negative for palpitations and leg swelling.   Gastrointestinal: Positive for abdominal distention and abdominal pain. Negative for constipation, diarrhea, nausea and vomiting.   Genitourinary: Negative for dysuria, frequency and hematuria.   Musculoskeletal: Negative for back pain, gait problem " and neck pain.   Skin: Negative for rash and wound.   Neurological: Positive for weakness. Negative for dizziness, syncope, speech difficulty and light-headedness.   Psychiatric/Behavioral: Negative for agitation and confusion. The patient is not nervous/anxious.      Objective:     Vital Signs (Most Recent):  Temp: 97.6 °F (36.4 °C) (07/27/20 0745)  Pulse: 75 (07/27/20 0745)  Resp: 20 (07/27/20 0745)  BP: (!) 173/71 (07/27/20 0745)  SpO2: 100 % (07/27/20 0745) Vital Signs (24h Range):  Temp:  [97.6 °F (36.4 °C)-98.6 °F (37 °C)] 97.6 °F (36.4 °C)  Pulse:  [69-85] 75  Resp:  [17-28] 20  SpO2:  [89 %-100 %] 100 %  BP: (117-179)/(59-87) 173/71     Weight: 86.2 kg (190 lb 0.6 oz)  Body mass index is 33.66 kg/m².    Physical Exam  Vitals signs and nursing note reviewed.   Constitutional:       General: She is not in acute distress.     Appearance: She is well-developed.      Interventions: Nasal cannula in place.   HENT:      Head: Normocephalic and atraumatic.      Mouth/Throat:      Pharynx: No oropharyngeal exudate.   Eyes:      Conjunctiva/sclera: Conjunctivae normal.      Pupils: Pupils are equal, round, and reactive to light.   Neck:      Musculoskeletal: Normal range of motion and neck supple.   Cardiovascular:      Rate and Rhythm: Normal rate and regular rhythm.      Heart sounds: Normal heart sounds.   Pulmonary:      Effort: Pulmonary effort is normal. No respiratory distress.      Breath sounds: Examination of the right-lower field reveals decreased breath sounds and rales. Examination of the left-lower field reveals decreased breath sounds and rales. Decreased breath sounds and rales present. No wheezing.   Chest:      Chest wall: Tenderness (right and left lower ribs) present.   Abdominal:      General: Bowel sounds are normal. There is no distension.      Palpations: Abdomen is soft.      Tenderness: There is no abdominal tenderness.   Musculoskeletal: Normal range of motion.         General: No  tenderness.   Lymphadenopathy:      Cervical: No cervical adenopathy.   Skin:     General: Skin is warm and dry.      Capillary Refill: Capillary refill takes less than 2 seconds.      Findings: No rash.   Neurological:      Mental Status: She is alert and oriented to person, place, and time.      Cranial Nerves: No cranial nerve deficit.      Sensory: No sensory deficit.      Coordination: Coordination normal.   Psychiatric:         Behavior: Behavior normal.         Thought Content: Thought content normal.         Judgment: Judgment normal.           CRANIAL NERVES     CN III, IV, VI   Pupils are equal, round, and reactive to light.       Significant Labs:   BMP:   Recent Labs   Lab 07/27/20 0232   *   *   K 4.5      CO2 20*   BUN 78*   CREATININE 11.7*   CALCIUM 9.0   MG 3.0*     CBC:   Recent Labs   Lab 07/27/20 0232   WBC 8.36   HGB 10.8*   HCT 36.1*        All pertinent labs within the past 24 hours have been reviewed.    Significant Imaging: I have reviewed all pertinent imaging results/findings within the past 24 hours.

## 2020-07-27 NOTE — CONSULTS
[facility]  Nephrology  Consult Note    Patient Name: Lucy Perez  MRN: [id number]  Admission Date: 7/27/2020  Hospital Length of Stay: 0 days  Attending Provider: Matt Leon MD   Primary Care Physician: Beverly Muniz MD  Principal Problem:Acute respiratory failure with hypoxia    Inpatient consult to Nephrology  Consult performed by: Lani Schwarz MD  Consult ordered by: Monty Herbert PA-C        Subjective:     HPI: 59 yro F with ESRD on PD, HTN, DM2, CVA with residual hemiparesis (left), Recurrent AV fistula thrombosis/stenosis, CAD, THERESA, and sarcoidosis who presented to the ED 7/27/20 for abdominal pain that started Sunday AM at the end of her nightly Saturday - Sunday PD session. The pain is located in the RUQ around the area of the R lower rib. This is associated with bloating, nausea, and SOB. She normally makes some urine, but denies any UO for past day. She normally has 1 BM daily on home miralax, but denies BM for the past 24 hours. She denies fevers, chills, dysuria, hematuria, or any recent issues with her PD catheter. She reports her PD Rx was increased about 1 week ago from 1800 cc fills to 2200 cc fills. She reports abdominal discomfort with the increase in fill volumes. In the ED, she was given 100 IV lasix with 1 unrecorded UO.     Home Rx  Nightly peritoneal dialysis  MD: Brandi Bower  HD Ctr: [facility]  Fill volume: 2200 cc  Number of exchanges: 5  Bags: Alternating 2.5% and 1.5% bags  Duration: 10 hours        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    Cortical cataract of both eyes 7/26/2016    Decubitus ulcer of  buttock, stage 2     Degeneration of lumbar or lumbosacral intervertebral disc 3/5/2013    S/p MRI L-spine 5/2009     Depression     Encounter for blood transfusion     ESRD on hemodialysis     Started August 2018    General anesthetics causing adverse effect in therapeutic use     Hemorrhagic cerebrovascular accident (CVA)     8/2016 s/p Hemicraniotomy at [facility] with Left hemiparesis    History of stroke 6/28/2017    s/p R-MCA stroke with R-putaminal hemorrhagic transformation in 8/2016 and 11/2016 (s/p hemicraniotomy at [facility]) with residual L hemiparesis, on AED s/p CVA      Hypertensive retinopathy of both eyes 7/26/2016    Impingement syndrome of right shoulder 7/2/2014    Obesity     THERESA (obstructive sleep apnea) 3/5/2013    No Home CPAP 2ndary to cost     Partial symptomatic epilepsy with complex partial seizures, not intractable, without status epilepticus     Rheumatoid arthritis(714.0)     Rotator cuff tear 7/2/2014    Sarcoidosis     Stroke 2016    left sided flaccidity, SAH    Vertebral artery stenosis 3/5/2013    S/p Stenting per Dr Burnett        Past Surgical History:   Procedure Laterality Date    BREAST SURGERY      breast reduction    COLONOSCOPY N/A 8/11/2016    Procedure: COLONOSCOPY;  Surgeon: Jerry Vilchis MD;  Location: [facility] ENDO (4TH FLR);  Service: Endoscopy;  Laterality: N/A;  Patient reports difficulty awaking from anesthesia in the past.    DECLOTTING OF VASCULAR GRAFT Right 6/20/2019    Procedure: DECLOT-GRAFT;  Surgeon: Cabrera Irwin MD;  Location: [facility] CATH LAB;  Service: Cardiology;  Laterality: Right;    DECLOTTING OF VASCULAR GRAFT Right 12/6/2019    Procedure: DECLOT-GRAFT;  Surgeon: Cabrera Irwin MD;  Location: [facility] OR 2ND FLR;  Service: Peripheral Vascular;  Laterality: Right;    DECLOTTING OF VASCULAR GRAFT N/A 6/10/2020    Procedure: Declotting, Vascular Graft;  Surgeon: SERENA Buchanan II, MD;  Location: [facility] CATH LAB;  Service: Vascular;   Laterality: N/A;    DIAGNOSTIC LAPAROSCOPY N/A 6/8/2020    Procedure: LAPAROSCOPY, DIAGNOSTIC (POSSIBLE PD CATH REVISION);  Surgeon: Paige Monsalve MD;  [facility];  Service: General;  Laterality: N/A;    FISTULOGRAM Right 2/11/2019    Procedure: Fistulogram;  Surgeon: Meir Valencia MD;  Location: [facility] CATH LAB;  Service: Peripheral Vascular;  Laterality: Right;    FISTULOGRAM Right 7/8/2019    Procedure: Fistulogram;  Surgeon: WELLINGTON Palm III, MD;  Location: [facility] CATH LAB;  Service: Peripheral Vascular;  Laterality: Right;    FISTULOGRAM Right 12/6/2019    Procedure: Fistulogram;  Surgeon: Cabrera Irwin MD;  [address];  Service: Peripheral Vascular;  Laterality: Right;    FISTULOGRAM Right 3/12/2020    Procedure: FISTULOGRAM;  Surgeon: Meir Valencia MD;  Location: [facility] CATH LAB;  Service: Peripheral Vascular;  Laterality: Right;    HYSTERECTOMY  1999    JOSE LUIS/BSO (AUB)    LAPAROSCOPIC LYSIS OF ADHESIONS N/A 3/9/2020    Procedure: LYSIS, ADHESIONS, LAPAROSCOPIC;  Surgeon: Paige Monsalve MD;  Location: [address];  Service: General;  Laterality: N/A;    LAPAROSCOPIC REVISION OF PROCEDURE INVOLVING PERITONEAL DIALYSIS CATHETER N/A 6/8/2020    Procedure: REVISION OF PROCEDURE INVOLVING PERITONEAL DIALYSIS CATHETER, LAPAROSCOPIC;  Surgeon: Paige Monsalve MD;  Location: [address];  Service: General;  Laterality: N/A;    PLACEMENT OF ARTERIOVENOUS GRAFT Right 10/18/2018    Procedure: AV GRAFT CREATION;  Surgeon: Meir Valencia MD;  Location: [address];  Service: Cardiovascular;  Laterality: Right;    ROTATOR CUFF REPAIR Right July 9, 2014    right side    Skull surgery      Aneurysm    stent placed      in vertebral artery    TOTAL REDUCTION MAMMOPLASTY      TUBAL LIGATION         Review of patient's allergies indicates:   Allergen Reactions    Lisinopril Other (See Comments)     Angioedema      Vicodin  [hydrocodone-acetaminophen] Rash     No problem with acetaminophen      Current Facility-Administered Medications   Medication Frequency    acetaminophen tablet 650 mg Q4H PRN    albuterol-ipratropium 2.5 mg-0.5 mg/3 mL nebulizer solution 3 mL Q4H PRN    bisacodyL suppository 10 mg Daily PRN    dextrose 50% injection 12.5 g PRN    dextrose 50% injection 25 g PRN    glucagon (human recombinant) injection 1 mg PRN    glucose chewable tablet 16 g PRN    glucose chewable tablet 24 g PRN    insulin aspart U-100 pen 0-5 Units QID (AC + HS) PRN    melatonin tablet 6 mg Nightly PRN    ondansetron disintegrating tablet 8 mg Q8H PRN    polyethylene glycol packet 17 g Daily    promethazine (PHENERGAN) 6.25 mg in dextrose 5 % 50 mL IVPB Q6H PRN    sodium chloride 0.9% flush 10 mL PRN    sodium chloride 0.9% flush 5 mL PRN     Family History     Problem Relation (Age of Onset)    Bell's palsy Sister    Blindness Maternal Grandmother    Breast cancer Mother    Diabetes Mother, Son,     Heart attack Mother    Heart disease Mother    Hypertension Mother, Sister    Lupus Sister    No Known Problems Daughter    Sleep apnea Sister    Stroke Sister, Maternal Aunt        Tobacco Use    Smoking status: Never Smoker    Smokeless tobacco: Never Used   Substance and Sexual Activity    Alcohol use: Yes     Frequency: Monthly or less     Drinks per session: 1 or 2     Binge frequency: Never     Comment: occasional wine cooler     Drug use: Never    Sexual activity: Yes     Partners: Male     Birth control/protection: Post-menopausal     Review of Systems   Constitutional: Negative for chills and fever.   HENT: Negative for congestion and rhinorrhea.    Eyes: Negative for photophobia and visual disturbance.   Respiratory: Positive for shortness of breath. Negative for cough.    Cardiovascular: Negative for chest pain, palpitations and leg swelling.   Gastrointestinal: Positive for abdominal pain and constipation. Negative  for diarrhea.   Endocrine: Negative for cold intolerance and heat intolerance.   Genitourinary: Positive for decreased urine volume. Negative for dysuria and hematuria.   Musculoskeletal: Negative for arthralgias and myalgias.   Skin: Negative for pallor and rash.   Neurological: Negative for weakness and headaches.   Hematological: Negative for adenopathy. Does not bruise/bleed easily.   Psychiatric/Behavioral: Negative for agitation and behavioral problems.     Objective:     Vital Signs (Most Recent):  Temp: 97.6 °F (36.4 °C) (07/27/20 0745)  Pulse: 75 (07/27/20 0745)  Resp: 20 (07/27/20 0745)  BP: (!) 173/71 (07/27/20 0745)  SpO2: 100 % (07/27/20 0745)  O2 Device (Oxygen Therapy): nasal cannula (07/27/20 0930) Vital Signs (24h Range):  Temp:  [97.6 °F (36.4 °C)-98.6 °F (37 °C)] 97.6 °F (36.4 °C)  Pulse:  [69-85] 75  Resp:  [17-28] 20  SpO2:  [89 %-100 %] 100 %  BP: (117-179)/(59-87) 173/71     Weight: 86.2 kg (190 lb 0.6 oz) (07/27/20 0930)  Body mass index is 33.66 kg/m².  Body surface area is 1.96 meters squared.    No intake/output data recorded.    Physical Exam  Constitutional:       General: She is not in acute distress.     Appearance: She is well-developed.   HENT:      Head: Normocephalic and atraumatic.      Nose: Nose normal.      Mouth/Throat:      Pharynx: No oropharyngeal exudate.   Eyes:      General: No scleral icterus.     Conjunctiva/sclera: Conjunctivae normal.      Pupils: Pupils are equal, round, and reactive to light.   Neck:      Musculoskeletal: Neck supple.      Thyroid: No thyromegaly.   Cardiovascular:      Rate and Rhythm: Normal rate and regular rhythm.      Heart sounds: Normal heart sounds. No murmur.   Pulmonary:      Effort: Pulmonary effort is normal. No respiratory distress.      Breath sounds: Normal breath sounds. No wheezing or rales.   Abdominal:      General: Bowel sounds are normal. There is no distension.      Palpations: Abdomen is soft.      Tenderness: There is no  "abdominal tenderness.      Comments: PD catheter in place   Musculoskeletal:         General: No tenderness or deformity.   Lymphadenopathy:      Cervical: No cervical adenopathy.   Skin:     General: Skin is warm and dry.   Neurological:      Mental Status: She is alert and oriented to person, place, and time.      Cranial Nerves: No cranial nerve deficit.         Significant Labs:  CBC:   Recent Labs   Lab 07/27/20  0232   WBC 8.36   RBC 3.50*   HGB 10.8*   HCT 36.1*      *   MCH 30.9   MCHC 29.9*     CMP:   Recent Labs   Lab 07/27/20  0232   *   CALCIUM 9.0   ALBUMIN 3.7   PROT 8.1   *   K 4.5   CO2 20*      BUN 78*   CREATININE 11.7*   ALKPHOS 110   ALT 5*   AST 10   BILITOT 0.5     All labs within the past 24 hours have been reviewed.    Significant Imaging:  Imaging Results          X-Ray Chest AP Portable (Final result)  Result time 07/27/20 03:09:23    Final result by Maycol Rodriguez MD (07/27/20 03:09:23)                 Impression:      Mild cardiomegaly with possible mild edema and bibasilar subsegmental atelectasis.  No detrimental change when compared with 06/13/2020.      Electronically signed by: Maycol Rodriguez MD  Date:    07/27/2020  Time:    03:09             Narrative:    EXAMINATION:  XR CHEST AP PORTABLE    CLINICAL HISTORY:  Provided history is "Epigastric pain, hypoxia.;  ".    TECHNIQUE:  One view of the chest.    COMPARISON:  06/13/2020.    FINDINGS:  Cardiac wires overlie the chest.  Lung volumes are low.  Right hemidiaphragm is mildly elevated.  Cardiomediastinal silhouette is mildly enlarged but stable.  Atherosclerotic calcifications overlie the aortic arch.  Prominent perihilar interstitial lung markings and platelike subsegmental atelectasis in the lower lung zones.  No confluent area of consolidation.  No sizable pleural effusion.  No pneumothorax.  Right subclavian vascular stent again noted.                                  Assessment/Plan: "     ESRD on PD    59 yro F with ESRD on PD, HTN, DM2 and Recurrent AV fistula thrombosis/stenosis who presented 7/27/20 for abdominal pain that started Sunday AM at the end of her nightly Saturday - Sunday PD session. PD Rx was increased ~1 week ago from 1800 cc fills to 2200 cc fills. Nephrology is consulted for abdominal pain in PD patient and in-pt management of PD and ESRD.     Home Rx  Nightly peritoneal dialysis  MD: Brandi Bower  HD Ctr: [facility]  Fill volume: 2200 cc  Number of exchanges: 5  Bags: Alternating 2.5% and 1.5% bags  Duration: 10 hours    -recommend bowel regimen, resume home miralax  -Ordered peritoneal fluid sample for Bcx, cell count, gram stain to rule out peritonitis  -Will contact out-pt HD center to verify home PD Rx  -Will plan on PD tonight with 2L fills, rest of Rx will remain as above  -Strict IOs  -Trend RFPs, renally dose mediations, avoid nephrotoxins  -Please see attending attestation to follow          Thank you for your consult. I will follow-up with patient. Please contact us if you have any additional questions.    Lani Schwarz MD  Nephrology  [facility]

## 2020-07-27 NOTE — PROGRESS NOTES
Dialysis   To bedside to draw PD Labs.  PD catheter converted with Riley adapter and labs drawn, catheter capped using aseptic technique. specimen sent to lab

## 2020-07-27 NOTE — NURSING
Pt arrived to unit via stretcher from ED.  Pt aaox4, no distress noted.  Pt c/o pain to back 8/10.  Safety precautions maintained, bed locked and in lowest position.  Call light placed within reach.

## 2020-07-27 NOTE — ED NOTES
Patient instructed on need for urine sample, daughter states patient makes urine about 2x/day. Patient states that she feels like she could urinate at this time, patient placed on pure wick device for urine collection

## 2020-07-27 NOTE — NURSING
Pt transported to unit from E/D; pt AAOx4; on 2L NC; VSS; NADN; oriented to room; purick attached to suction; safety and fall precautions initiated; will continue to monitor

## 2020-07-27 NOTE — SUBJECTIVE & OBJECTIVE
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    Cortical cataract of both eyes 7/26/2016    Decubitus ulcer of buttock, stage 2     Degeneration of lumbar or lumbosacral intervertebral disc 3/5/2013    S/p MRI L-spine 5/2009     Depression     Encounter for blood transfusion     ESRD on hemodialysis     Started August 2018    General anesthetics causing adverse effect in therapeutic use     Hemorrhagic cerebrovascular accident (CVA)     8/2016 s/p Hemicraniot[place] [facility] [place] hemiparesis    History of stroke 6/28/2017    s/p R-MCA stroke with R-putaminal hemorrhagic transformation in 8/2016 and 11/2016 (s/p hemicraniotomy at [facility]) with residual L hemiparesis, on AED s/p CVA      Hypertensive retinopathy of both eyes 7/26/2016    Impingement syndrome of right shoulder 7/2/2014    Obesity     THERESA (obstructive sleep apnea) 3/5/2013    No Home CPAP 2ndary to cost     Partial symptomatic epilepsy with complex partial seizures, not intractable, without status epilepticus     Rheumatoid arthritis(714.0)     Rotator cuff tear 7/2/2014    Sarcoidosis     Stroke 2016    left sided flaccidity, SAH    Vertebral artery stenosis 3/5/2013    S/p Stenting per Dr Burnett        Past Surgical History:   Procedure Laterality Date    BREAST SURGERY      breast reduction    COLONOSCOPY N/A 8/11/2016    Procedure: COLONOSCOPY;  Surgeon: Jerry Vilchis MD;  Location: [address]);  Service: Endoscopy;  Laterality: N/A;  Patient reports difficulty awaking from anesthesia in the past.    DECLOTTING OF VASCULAR GRAFT Right 6/20/2019    Procedure: DECLOT-GRAFT;  Surgeon: Cabrera IBARRA  MD Alida;  Location: [facility] CATH LAB;  Service: Cardiology;  Laterality: Right;    DECLOTTING OF VASCULAR GRAFT Right 12/6/2019    Procedure: DECLOT-GRAFT;  Surgeon: Cabrera Irwin MD;  Location: [address] FLR;  Service: Peripheral Vascular;  Laterality: Right;    DECLOTTING OF VASCULAR GRAFT N/A 6/10/2020    Procedure: Declotting, Vascular Graft;  Surgeon: SERENA Buchanan II, MD;  Location: [facility] CATH LAB;  Service: Vascular;  Laterality: N/A;    DIAGNOSTIC LAPAROSCOPY N/A 6/8/2020    Procedure: LAPAROSCOPY, DIAGNOSTIC (POSSIBLE PD CATH REVISION);  Surgeon: Paige Monsalve MD;  [address];  Service: General;  Laterality: N/A;    FISTULOGRAM Right 2/11/2019    Procedure: Fistulogram;  Surgeon: Meir Valencia MD;  Location: [facility] CATH LAB;  Service: Peripheral Vascular;  Laterality: Right;    FISTULOGRAM Right 7/8/2019    Procedure: Fistulogram;  Surgeon: WELLINGTON Palm III, MD;  Location: [facility] CATH LAB;  Service: Peripheral Vascular;  Laterality: Right;    FISTULOGRAM Right 12/6/2019    Procedure: Fistulogram;  Surgeon: Cabrera Irwin MD;  [address];  Service: Peripheral Vascular;  Laterality: Right;    FISTULOGRAM Right 3/12/2020    Procedure: FISTULOGRAM;  Surgeon: Meir Valencia MD;  Location: [facility] CATH LAB;  Service: Peripheral Vascular;  Laterality: Right;    HYSTERECTOMY  1999    JOSE LUIS/BSO (AUB)    LAPAROSCOPIC LYSIS OF ADHESIONS N/A 3/9/2020    Procedure: LYSIS, ADHESIONS, LAPAROSCOPIC;  Surgeon: Paige Monsalve MD;  Location: [facility] OR [facility] FLR;  Service: General;  Laterality: N/A;    LAPAROSCOPIC REVISION OF PROCEDURE INVOLVING PERITONEAL DIALYSIS CATHETER N/A 6/8/2020    Procedure: REVISION OF PROCEDURE INVOLVING PERITONEAL DIALYSIS CATHETER, LAPAROSCOPIC;  Surgeon: Paige Monsalve MD;  [facility];  Service: General;  Laterality: N/A;    PLACEMENT OF ARTERIOVENOUS GRAFT Right 10/18/2018    Procedure: AV  GRAFT CREATION;  Surgeon: Meir Valencia MD;  Location: [facility] OR [address] [place] [address];  Service: Cardiovascular;  Laterality: Right;    ROTATOR CUFF REPAIR Right July 9, 2014    right side    Skull surgery      Aneurysm    stent placed      in vertebral artery    TOTAL REDUCTION MAMMOPLASTY      TUBAL LIGATION         Review of patient's allergies indicates:   Allergen Reactions    Lisinopril Other (See Comments)     Angioedema      Vicodin [hydrocodone-acetaminophen] Rash     No problem with acetaminophen      Current Facility-Administered Medications   Medication Frequency    acetaminophen tablet 650 mg Q4H PRN    albuterol-ipratropium 2.5 mg-0.5 mg/3 mL nebulizer solution 3 mL Q4H PRN    bisacodyL suppository 10 mg Daily PRN    dextrose 50% injection 12.5 g PRN    dextrose 50% injection 25 g PRN    glucagon (human recombinant) injection 1 mg PRN    glucose chewable tablet 16 g PRN    glucose chewable tablet 24 g PRN    insulin aspart U-100 pen 0-5 Units QID (AC + HS) PRN    melatonin tablet 6 mg Nightly PRN    ondansetron disintegrating tablet 8 mg Q8H PRN    polyethylene glycol packet 17 g Daily    promethazine (PHENERGAN) 6.25 mg in dextrose 5 % 50 mL IVPB Q6H PRN    sodium chloride 0.9% flush 10 mL PRN    sodium chloride 0.9% flush 5 mL PRN     Family History     Problem Relation (Age of Onset)    Bell's palsy Sister    Blindness Maternal Grandmother    Breast cancer Mother    Diabetes Mother, Son,     Heart attack Mother    Heart disease Mother    Hypertension Mother, Sister    Lupus Sister    No Known Problems Daughter    Sleep apnea Sister    Stroke Sister, Maternal Aunt        Tobacco Use    Smoking status: Never Smoker    Smokeless tobacco: Never Used   Substance and Sexual Activity    Alcohol use: Yes     Frequency: Monthly or less     Drinks per session: 1 or 2     Binge frequency: Never     Comment: occasional wine cooler     Drug use: Never    Sexual activity: Yes     Partners: Male      Birth control/protection: Post-menopausal     Review of Systems   Constitutional: Negative for chills and fever.   HENT: Negative for congestion and rhinorrhea.    Eyes: Negative for photophobia and visual disturbance.   Respiratory: Positive for shortness of breath. Negative for cough.    Cardiovascular: Negative for chest pain, palpitations and leg swelling.   Gastrointestinal: Positive for abdominal pain and constipation. Negative for diarrhea.   Endocrine: Negative for cold intolerance and heat intolerance.   Genitourinary: Positive for decreased urine volume. Negative for dysuria and hematuria.   Musculoskeletal: Negative for arthralgias and myalgias.   Skin: Negative for pallor and rash.   Neurological: Negative for weakness and headaches.   Hematological: Negative for adenopathy. Does not bruise/bleed easily.   Psychiatric/Behavioral: Negative for agitation and behavioral problems.     Objective:     Vital Signs (Most Recent):  Temp: 97.6 °F (36.4 °C) (07/27/20 0745)  Pulse: 75 (07/27/20 0745)  Resp: 20 (07/27/20 0745)  BP: (!) 173/71 (07/27/20 0745)  SpO2: 100 % (07/27/20 0745)  O2 Device (Oxygen Therapy): nasal cannula (07/27/20 0930) Vital Signs (24h Range):  Temp:  [97.6 °F (36.4 °C)-98.6 °F (37 °C)] 97.6 °F (36.4 °C)  Pulse:  [69-85] 75  Resp:  [17-28] 20  SpO2:  [89 %-100 %] 100 %  BP: (117-179)/(59-87) 173/71     Weight: 86.2 kg (190 lb 0.6 oz) (07/27/20 0930)  Body mass index is 33.66 kg/m².  Body surface area is 1.96 meters squared.    No intake/output data recorded.    Physical Exam  Constitutional:       General: She is not in acute distress.     Appearance: She is well-developed.   HENT:      Head: Normocephalic and atraumatic.      Nose: Nose normal.      Mouth/Throat:      Pharynx: No oropharyngeal exudate.   Eyes:      General: No scleral icterus.     Conjunctiva/sclera: Conjunctivae normal.      Pupils: Pupils are equal, round, and reactive to light.   Neck:      Musculoskeletal: Neck supple.  "     Thyroid: No thyromegaly.   Cardiovascular:      Rate and Rhythm: Normal rate and regular rhythm.      Heart sounds: Normal heart sounds. No murmur.   Pulmonary:      Effort: Pulmonary effort is normal. No respiratory distress.      Breath sounds: Normal breath sounds. No wheezing or rales.   Abdominal:      General: Bowel sounds are normal. There is no distension.      Palpations: Abdomen is soft.      Tenderness: There is no abdominal tenderness.      Comments: PD catheter in place   Musculoskeletal:         General: No tenderness or deformity.   Lymphadenopathy:      Cervical: No cervical adenopathy.   Skin:     General: Skin is warm and dry.   Neurological:      Mental Status: She is alert and oriented to person, place, and time.      Cranial Nerves: No cranial nerve deficit.         Significant Labs:  CBC:   Recent Labs   Lab 07/27/20  0232   WBC 8.36   RBC 3.50*   HGB 10.8*   HCT 36.1*      *   MCH 30.9   MCHC 29.9*     CMP:   Recent Labs   Lab 07/27/20 0232   *   CALCIUM 9.0   ALBUMIN 3.7   PROT 8.1   *   K 4.5   CO2 20*      BUN 78*   CREATININE 11.7*   ALKPHOS 110   ALT 5*   AST 10   BILITOT 0.5     All labs within the past 24 hours have been reviewed.    Significant Imaging:  Imaging Results          X-Ray Chest AP Portable (Final result)  Result time 07/27/20 03:09:23    Final result by Maycol Rodriguez MD (07/27/20 03:09:23)                 Impression:      Mild cardiomegaly with possible mild edema and bibasilar subsegmental atelectasis.  No detrimental change when compared with 06/13/2020.      Electronically signed by: Maycol Rodriguez MD  Date:    07/27/2020  Time:    03:09             Narrative:    EXAMINATION:  XR CHEST AP PORTABLE    CLINICAL HISTORY:  Provided history is "Epigastric pain, hypoxia.;  ".    TECHNIQUE:  One view of the chest.    COMPARISON:  06/13/2020.    FINDINGS:  Cardiac wires overlie the chest.  Lung volumes are low.  Right hemidiaphragm " is mildly elevated.  Cardiomediastinal silhouette is mildly enlarged but stable.  Atherosclerotic calcifications overlie the aortic arch.  Prominent perihilar interstitial lung markings and platelike subsegmental atelectasis in the lower lung zones.  No confluent area of consolidation.  No sizable pleural effusion.  No pneumothorax.  Right subclavian vascular stent again noted.

## 2020-07-27 NOTE — HPI
59 yro F with ESRD on PD, HTN, DM2, CVA with residual hemiparesis (left), Recurrent AV fistula thrombosis/stenosis, CAD, THERESA, and sarcoidosis who presented to the ED 7/27/20 for abdominal pain that started Sunday AM at the end of her nightly Saturday - Sunday PD session. The pain is located in the RUQ around the area of the R lower rib. This is associated with bloating, nausea, and SOB. She normally makes some urine, but denies any UO for past day. She normally has 1 BM daily on home miralax, but denies BM for the past 24 hours. She denies fevers, chills, dysuria, hematuria, or any recent issues with her PD catheter. She reports her PD Rx was increased about 1 week ago from 1800 cc fills to 2200 cc fills. She reports abdominal discomfort with the increase in fill volumes. In the ED, she was given 100 IV lasix with 1 unrecorded UO.     Home Rx  Nightly peritoneal dialysis  MD: Brandi Bower  HD Ctr: [facility]  Fill volume: 2200 cc  Number of exchanges: 5  Bags: Alternating 2.5% and 1.5% bags  Duration: 10 hours

## 2020-07-27 NOTE — ED NOTES
Telemetry Verification   Patient placed on Telemetry Box  Verified with War Room  Box # [id number]       Rate 70   Rhythm NSR

## 2020-07-27 NOTE — PLAN OF CARE
SW met with patient and spouse at bedside. Patient confirmed information on FS. Patient lives at home with her spouse and adult daughter who provide support. Patient reported being on home dialysis. Patient uses 2-3L O2 PRN. Patient is wheelchair bound. Patient has a lift to assist with getting up steps to enter home. SW provided patient with [facility] phone number per request for PCA assistance. Patient has no concerns at this time. SW/CM to follow and assist with needs as indicated.     Beverly Muniz MD    [facility] [address] [place] [address] [address] [facility]  [address] [facility] [address]  Phone: [phone] Fax: [phone]     07/27/20 1256   Discharge Assessment   Assessment Type Discharge Planning Assessment   Confirmed/corrected address and phone number on facesheet? Yes   Assessment information obtained from? Patient   Expected Length of Stay (days) 1   Communicated expected length of stay with patient/caregiver yes   Prior to hospitilization cognitive status: Alert/Oriented   Prior to hospitalization functional status: Wheelchair Bound   Current cognitive status: Alert/Oriented   Current Functional Status: Wheelchair Bound   Lives With child(kieran), adult;spouse   Able to Return to Prior Arrangements yes   Is patient able to care for self after discharge? Yes   Patient's perception of discharge disposition home or selfcare   Patient currently being followed by outpatient case management? No   Patient currently receives any other outside agency services? No   Equipment Currently Used at Home oxygen;wheelchair   Do you have any problems affording any of your prescribed medications? No   Is the patient taking medications as prescribed? yes   Does the patient have transportation home? Yes   Transportation Anticipated family or friend will provide   Dialysis Name and Scheduled days Pt on home dialysis   Does the patient receive services at the [facility]? No   Discharge Plan A  Home with family   Discharge Plan B Home with family   DME Needed Upon Discharge  none   Patient/Family in Agreement with Plan yes       Keira Luis, NICOLE  [facility]  [id number]

## 2020-07-27 NOTE — PROVIDER PROGRESS NOTES - EMERGENCY DEPT.
Encounter Date: 7/27/2020    ED Physician Progress Notes        Physician Note:   I took report on this patient from outgoing physician team due to end of their shift and pending evaluation. Pt is a PD pt who presents to the ER c/o acute SOB. She states that she has increased abdominal discomfort/pressure. Benign abdomen on exam - no peritoneal signs - do not suspect SBP.   Sa02 noted to range from 88% - 93%. Admission anticipated.   Covid negative. CXR shows pulmonary edema. Slightly elevated troponin.    specifies Obs   I discussed the case with Hospital Medicine, Dr Espinoza, who requests IV lasix 100 mg IV now, nephrology consult, and admission to Dr Leon

## 2020-07-27 NOTE — ED TRIAGE NOTES
Pt here with daughter, reports worsening SOB and bilateral upper abdominal pain that began yesterday morning around 1078-1053 yesterday morning. Pt receives peritoneal dialysis daily and states her pain began during her treatment. States that this is worsening with exertion

## 2020-07-27 NOTE — ASSESSMENT & PLAN NOTE
59 yro F with ESRD on PD, HTN, DM2 and Recurrent AV fistula thrombosis/stenosis who presented 7/27/20 for abdominal pain that started Sunday AM at the end of her nightly Saturday - Sunday PD session. PD Rx was increased ~1 week ago from 1800 cc fills to 2200 cc fills. Nephrology is consulted for abdominal pain in PD patient and in-pt management of PD and ESRD.     Home Rx  Nightly peritoneal dialysis  MD: Brandi Bower  HD Ctr: [facility]  Fill volume: 2200 cc  Number of exchanges: 5  Bags: Alternating 2.5% and 1.5% bags  Duration: 10 hours    -recommend continue bowel regimen, resumed home miralax  -peritoneal fluid sample does not show clear evidence of peritonitis thus far, however WBC count elevated; Cx- pending, cell count- 283 WBC, 5 segs, 61 monocytes, gram stain no organisms   -Will repeat peritoneal fluid studies today  -Will plan on PD tonight with 2L fills, rest of Rx will remain as above  -Strict IOs  -Trend RFPs, renally dose mediations, avoid nephrotoxins  -Please see attending attestation to follow

## 2020-07-27 NOTE — NURSING
POC reviewed with pt and spouse; understanding verbalized; pain being managed with PRN meds as per MAR; NADN; safety and fall precautions maintained; will continue to monitor

## 2020-07-28 LAB
ANION GAP SERPL CALC-SCNC: 21 MMOL/L (ref 8–16)
APPEARANCE FLD: CLEAR
BASOPHILS # BLD AUTO: 0.05 K/UL (ref 0–0.2)
BASOPHILS NFR BLD: 0.6 % (ref 0–1.9)
BODY FLD TYPE: NORMAL
BUN SERPL-MCNC: 81 MG/DL (ref 8–23)
CALCIUM SERPL-MCNC: 8.1 MG/DL (ref 8.7–10.5)
CHLORIDE SERPL-SCNC: 105 MMOL/L (ref 95–110)
CO2 SERPL-SCNC: 18 MMOL/L (ref 23–29)
COLOR FLD: COLORLESS
CREAT SERPL-MCNC: 12 MG/DL (ref 0.5–1.4)
DIFFERENTIAL METHOD: ABNORMAL
EOSINOPHIL # BLD AUTO: 0.3 K/UL (ref 0–0.5)
EOSINOPHIL NFR BLD: 3.5 % (ref 0–8)
ERYTHROCYTE [DISTWIDTH] IN BLOOD BY AUTOMATED COUNT: 14.7 % (ref 11.5–14.5)
EST. GFR  (AFRICAN AMERICAN): 3.5 ML/MIN/1.73 M^2
EST. GFR  (NON AFRICAN AMERICAN): 3 ML/MIN/1.73 M^2
ESTIMATED AVG GLUCOSE: 148 MG/DL (ref 68–131)
GLUCOSE SERPL-MCNC: 142 MG/DL (ref 70–110)
GRAM STN SPEC: NORMAL
GRAM STN SPEC: NORMAL
HBA1C MFR BLD HPLC: 6.8 % (ref 4–5.6)
HCT VFR BLD AUTO: 30.4 % (ref 37–48.5)
HGB BLD-MCNC: 9.6 G/DL (ref 12–16)
IMM GRANULOCYTES # BLD AUTO: 0.17 K/UL (ref 0–0.04)
IMM GRANULOCYTES NFR BLD AUTO: 2 % (ref 0–0.5)
LYMPHOCYTES # BLD AUTO: 2.1 K/UL (ref 1–4.8)
LYMPHOCYTES NFR BLD: 24.2 % (ref 18–48)
LYMPHOCYTES NFR FLD MANUAL: 80 %
MAGNESIUM SERPL-MCNC: 2.8 MG/DL (ref 1.6–2.6)
MCH RBC QN AUTO: 30.8 PG (ref 27–31)
MCHC RBC AUTO-ENTMCNC: 31.6 G/DL (ref 32–36)
MCV RBC AUTO: 97 FL (ref 82–98)
MONOCYTES # BLD AUTO: 0.8 K/UL (ref 0.3–1)
MONOCYTES NFR BLD: 9.2 % (ref 4–15)
MONOS+MACROS NFR FLD MANUAL: 10 %
NEUTROPHILS # BLD AUTO: 5.2 K/UL (ref 1.8–7.7)
NEUTROPHILS NFR BLD: 60.5 % (ref 38–73)
NEUTROPHILS NFR FLD MANUAL: 10 %
NRBC BLD-RTO: 0 /100 WBC
PHOSPHATE SERPL-MCNC: 7.1 MG/DL (ref 2.7–4.5)
PLATELET # BLD AUTO: 222 K/UL (ref 150–350)
PMV BLD AUTO: 10.9 FL (ref 9.2–12.9)
POCT GLUCOSE: 173 MG/DL (ref 70–110)
POCT GLUCOSE: 174 MG/DL (ref 70–110)
POCT GLUCOSE: 195 MG/DL (ref 70–110)
POCT GLUCOSE: 243 MG/DL (ref 70–110)
POTASSIUM SERPL-SCNC: 4.9 MMOL/L (ref 3.5–5.1)
RBC # BLD AUTO: 3.12 M/UL (ref 4–5.4)
SODIUM SERPL-SCNC: 144 MMOL/L (ref 136–145)
WBC # BLD AUTO: 8.56 K/UL (ref 3.9–12.7)
WBC # FLD: 4 /CU MM

## 2020-07-28 PROCEDURE — 89051 BODY FLUID CELL COUNT: CPT | Mod: HCNC

## 2020-07-28 PROCEDURE — 25000003 PHARM REV CODE 250: Mod: HCNC | Performed by: PHYSICIAN ASSISTANT

## 2020-07-28 PROCEDURE — 80048 BASIC METABOLIC PNL TOTAL CA: CPT | Mod: HCNC

## 2020-07-28 PROCEDURE — 93010 ELECTROCARDIOGRAM REPORT: CPT | Mod: HCNC,,, | Performed by: INTERNAL MEDICINE

## 2020-07-28 PROCEDURE — G0378 HOSPITAL OBSERVATION PER HR: HCPCS | Mod: HCNC

## 2020-07-28 PROCEDURE — 27200950 HC CAPD SUPPORT: Mod: HCNC

## 2020-07-28 PROCEDURE — 93005 ELECTROCARDIOGRAM TRACING: CPT | Mod: HCNC

## 2020-07-28 PROCEDURE — 87205 SMEAR GRAM STAIN: CPT | Mod: HCNC

## 2020-07-28 PROCEDURE — 99226 PR SUBSEQUENT OBSERVATION CARE,LEVEL III: CPT | Mod: HCNC,,, | Performed by: PHYSICIAN ASSISTANT

## 2020-07-28 PROCEDURE — 36415 COLL VENOUS BLD VENIPUNCTURE: CPT | Mod: HCNC

## 2020-07-28 PROCEDURE — 93010 EKG 12-LEAD: ICD-10-PCS | Mod: HCNC,,, | Performed by: INTERNAL MEDICINE

## 2020-07-28 PROCEDURE — 99226 PR SUBSEQUENT OBSERVATION CARE,LEVEL III: ICD-10-PCS | Mod: HCNC,,, | Performed by: PHYSICIAN ASSISTANT

## 2020-07-28 PROCEDURE — 11000001 HC ACUTE MED/SURG PRIVATE ROOM: Mod: HCNC

## 2020-07-28 PROCEDURE — 96372 THER/PROPH/DIAG INJ SC/IM: CPT

## 2020-07-28 PROCEDURE — 87070 CULTURE OTHR SPECIMN AEROBIC: CPT | Mod: HCNC

## 2020-07-28 PROCEDURE — 84100 ASSAY OF PHOSPHORUS: CPT | Mod: HCNC

## 2020-07-28 PROCEDURE — 83735 ASSAY OF MAGNESIUM: CPT | Mod: HCNC

## 2020-07-28 PROCEDURE — 83036 HEMOGLOBIN GLYCOSYLATED A1C: CPT | Mod: HCNC

## 2020-07-28 PROCEDURE — 90945 DIALYSIS ONE EVALUATION: CPT | Mod: HCNC

## 2020-07-28 PROCEDURE — 85025 COMPLETE CBC W/AUTO DIFF WBC: CPT | Mod: HCNC

## 2020-07-28 RX ORDER — OXYCODONE HYDROCHLORIDE 5 MG/1
5 TABLET ORAL EVERY 6 HOURS PRN
Status: DISCONTINUED | OUTPATIENT
Start: 2020-07-28 | End: 2020-07-30 | Stop reason: HOSPADM

## 2020-07-28 RX ORDER — SYRING-NEEDL,DISP,INSUL,0.3 ML 29 G X1/2"
296 SYRINGE, EMPTY DISPOSABLE MISCELLANEOUS ONCE
Status: COMPLETED | OUTPATIENT
Start: 2020-07-28 | End: 2020-07-28

## 2020-07-28 RX ADMIN — SEVELAMER CARBONATE 800 MG: 800 TABLET, FILM COATED ORAL at 04:07

## 2020-07-28 RX ADMIN — LIDOCAINE 1 PATCH: 50 PATCH TOPICAL at 12:07

## 2020-07-28 RX ADMIN — CLOPIDOGREL 75 MG: 75 TABLET, FILM COATED ORAL at 09:07

## 2020-07-28 RX ADMIN — ASPIRIN 81 MG: 81 TABLET, COATED ORAL at 06:07

## 2020-07-28 RX ADMIN — ATORVASTATIN CALCIUM 40 MG: 40 TABLET, FILM COATED ORAL at 09:07

## 2020-07-28 RX ADMIN — LEVETIRACETAM 500 MG: 500 TABLET ORAL at 04:07

## 2020-07-28 RX ADMIN — ACETAMINOPHEN 650 MG: 325 TABLET ORAL at 06:07

## 2020-07-28 RX ADMIN — OXYCODONE HYDROCHLORIDE 5 MG: 5 TABLET ORAL at 10:07

## 2020-07-28 RX ADMIN — POLYETHYLENE GLYCOL 3350 17 G: 17 POWDER, FOR SOLUTION ORAL at 09:07

## 2020-07-28 RX ADMIN — MAGNESIUM CITRATE 296 ML: 1.75 LIQUID ORAL at 05:07

## 2020-07-28 RX ADMIN — INSULIN DETEMIR 5 UNITS: 100 INJECTION, SOLUTION SUBCUTANEOUS at 09:07

## 2020-07-28 RX ADMIN — LOSARTAN POTASSIUM 50 MG: 50 TABLET ORAL at 09:07

## 2020-07-28 RX ADMIN — SEVELAMER CARBONATE 800 MG: 800 TABLET, FILM COATED ORAL at 12:07

## 2020-07-28 RX ADMIN — FLUOXETINE 20 MG: 20 CAPSULE ORAL at 09:07

## 2020-07-28 RX ADMIN — TORSEMIDE 20 MG: 20 TABLET ORAL at 09:07

## 2020-07-28 RX ADMIN — LEVETIRACETAM 1000 MG: 500 TABLET ORAL at 09:07

## 2020-07-28 RX ADMIN — INSULIN ASPART 2 UNITS: 100 INJECTION, SOLUTION INTRAVENOUS; SUBCUTANEOUS at 04:07

## 2020-07-28 RX ADMIN — SEVELAMER CARBONATE 800 MG: 800 TABLET, FILM COATED ORAL at 09:07

## 2020-07-28 NOTE — PROGRESS NOTES
[facility]  Nephrology  Progress Note    Patient Name: Lucy Perez  MRN: [id number]  Admission Date: 7/27/2020  Hospital Length of Stay: 0 days  Attending Provider: Matt Leon MD   Primary Care Physician: Beverly Muniz MD  Principal Problem:Acute respiratory failure with hypoxia    Subjective:     HPI: 59 yro F with ESRD on PD, HTN, DM2, CVA with residual hemiparesis (left), Recurrent AV fistula thrombosis/stenosis, CAD, THERESA, and sarcoidosis who presented to the ED 7/27/20 for abdominal pain that started Sunday AM at the end of her nightly Saturday - Sunday PD session. The pain is located in the RUQ around the area of the R lower rib. This is associated with bloating, nausea, and SOB. She normally makes some urine, but denies any UO for past day. She normally has 1 BM daily on home miralax, but denies BM for the past 24 hours. She denies fevers, chills, dysuria, hematuria, or any recent issues with her PD catheter. She reports her PD Rx was increased about 1 week ago from 1800 cc fills to 2200 cc fills. She reports abdominal discomfort with the increase in fill volumes. In the ED, she was given 100 IV lasix with 1 unrecorded UO.     Home Rx  Nightly peritoneal dialysis  MD: Brandi Bower  HD Ctr: [facility]  Fill volume: 2200 cc  Number of exchanges: 5  Bags: Alternating 2.5% and 1.5% bags  Duration: 10 hours        Interval History: Pt seen and examined at bedside. Reports abdominal pain improving with decrease in PD fill volume. She reports increased UO.     Objective:     Vital Signs (Most Recent):  Temp: 96.8 °F (36 °C) (07/28/20 0719)  Pulse: 68 (07/28/20 0727)  Resp: 16 (07/28/20 0719)  BP: 130/64 (07/28/20 0727)  SpO2: 97 % (07/28/20 0727)  O2 Device (Oxygen Therapy): nasal cannula (07/27/20 2010) Vital Signs (24h Range):  Temp:  [96.2 °F (35.7 °C)-97.6 °F (36.4 °C)] 96.8 °F (36 °C)  Pulse:  [68-86] 68  Resp:  [16-22] 16  SpO2:  [96 %-100 %] 97 %  BP: (121-192)/(58-83)  130/64     Weight: 86.2 kg (190 lb 0.6 oz) (07/27/20 0930)  Body mass index is 33.66 kg/m².  Body surface area is 1.96 meters squared.    I/O last 3 completed shifts:  In: 1042 [P.O.:1042]  Out: 100 [Urine:100]    Physical Exam  Constitutional:       General: She is not in acute distress.     Appearance: She is well-developed.   HENT:      Head: Normocephalic and atraumatic.      Nose: Nose normal.      Mouth/Throat:      Pharynx: No oropharyngeal exudate.   Eyes:      General: No scleral icterus.     Conjunctiva/sclera: Conjunctivae normal.      Pupils: Pupils are equal, round, and reactive to light.   Neck:      Musculoskeletal: Neck supple.      Thyroid: No thyromegaly.   Cardiovascular:      Rate and Rhythm: Normal rate and regular rhythm.      Heart sounds: Normal heart sounds. No murmur.   Pulmonary:      Effort: Pulmonary effort is normal. No respiratory distress.      Breath sounds: Normal breath sounds. No wheezing or rales.   Abdominal:      General: Bowel sounds are normal. There is no distension.      Palpations: Abdomen is soft.      Tenderness: There is no abdominal tenderness.      Comments: PD catheter in place   Musculoskeletal:         General: No tenderness or deformity.   Lymphadenopathy:      Cervical: No cervical adenopathy.   Skin:     General: Skin is warm and dry.   Neurological:      Mental Status: She is alert and oriented to person, place, and time.      Cranial Nerves: No cranial nerve deficit.         Significant Labs:  CBC:   Recent Labs   Lab 07/28/20  0359   WBC 8.56   RBC 3.12*   HGB 9.6*   HCT 30.4*      MCV 97   MCH 30.8   MCHC 31.6*     CMP:   Recent Labs   Lab 07/27/20  0232 07/28/20  0359   * 142*   CALCIUM 9.0 8.1*   ALBUMIN 3.7  --    PROT 8.1  --    * 144   K 4.5 4.9   CO2 20* 18*    105   BUN 78* 81*   CREATININE 11.7* 12.0*   ALKPHOS 110  --    ALT 5*  --    AST 10  --    BILITOT 0.5  --      All labs within the past 24 hours have been  "reviewed.    Significant Imaging:  Imaging Results          X-Ray Chest AP Portable (Final result)  Result time 07/27/20 03:09:23    Final result by Maycol Rodriguez MD (07/27/20 03:09:23)                 Impression:      Mild cardiomegaly with possible mild edema and bibasilar subsegmental atelectasis.  No detrimental change when compared with 06/13/2020.      Electronically signed by: Maycol Rodriguez MD  Date:    07/27/2020  Time:    03:09             Narrative:    EXAMINATION:  XR CHEST AP PORTABLE    CLINICAL HISTORY:  Provided history is "Epigastric pain, hypoxia.;  ".    TECHNIQUE:  One view of the chest.    COMPARISON:  06/13/2020.    FINDINGS:  Cardiac wires overlie the chest.  Lung volumes are low.  Right hemidiaphragm is mildly elevated.  Cardiomediastinal silhouette is mildly enlarged but stable.  Atherosclerotic calcifications overlie the aortic arch.  Prominent perihilar interstitial lung markings and platelike subsegmental atelectasis in the lower lung zones.  No confluent area of consolidation.  No sizable pleural effusion.  No pneumothorax.  Right subclavian vascular stent again noted.                                  Assessment/Plan:     ESRD on peritoneal dialysis  59 yro F with ESRD on PD, HTN, DM2 and Recurrent AV fistula thrombosis/stenosis who presented 7/27/20 for abdominal pain that started Sunday AM at the end of her nightly Saturday - Sunday PD session. PD Rx was increased ~1 week ago from 1800 cc fills to 2200 cc fills. Nephrology is consulted for abdominal pain in PD patient and in-pt management of PD and ESRD.     Home Rx  Nightly peritoneal dialysis  MD: Brandi Bower  HD Ctr: [facility]  Fill volume: 2200 cc  Number of exchanges: 5  Bags: Alternating 2.5% and 1.5% bags  Duration: 10 hours    -recommend continue bowel regimen, resumed home miralax  -peritoneal fluid sample does not show clear evidence of peritonitis thus far, however WBC count elevated; Cx- pending, cell count- 283 WBC, " 5 segs, 61 monocytes, gram stain no organisms   -Will repeat peritoneal fluid studies today  -Will plan on PD tonight with 2L fills, rest of Rx will remain as above  -Strict IOs  -Trend RFPs, renally dose mediations, avoid nephrotoxins  -Please see attending attestation to follow            Thank you for your consult. I will follow-up with patient. Please contact us if you have any additional questions.    Lani Schwarz MD  Nephrology  [facility]

## 2020-07-28 NOTE — PROGRESS NOTES
[facility] Hospital Medicine  Progress Note    Patient Name: Lucy Perez  MRN: [id number]  Patient Class: OP- Observation   Admission Date: 7/27/2020  Length of Stay: 0 days  Attending Physician: Matt Leon MD  Primary Care Provider: Beverly Muniz MD    [facility] Medicine Team: [facility] Y Keisha Ybarra PA-C    Subjective:     Principal Problem:Acute respiratory failure with hypoxia        HPI:  Patient is a 60yo F with ESRD on PD, HTN, DM2, CVA with residual hemiparesis (left), Recurrent AV fistula thrombosis/stenosis, CAD, THERESA, and sarcoidosis being admitted to observation for acute on chronic hypoxic respiratory failure. She presented to the ED 7/27/20 for abdominal pain that started Sunday AM at the end of her nightly Saturday - Sunday PD session. The pain is located in the RUQ around the area of the R lower rib. This is associated with bloating, nausea, and SOB. She normally makes some urine, but denies any UO for past day. She normally has 1 BM daily on home miralax, but denies BM for the past 24 hours. She denies fevers, chills, dysuria, hematuria, or any recent issues with her PD catheter. She reports her PD Rx was increased about 1 week ago from 1800 cc fills to 2200 cc fills. She reports abdominal discomfort with the increase in fill volumes. In the ED, she was given 100 IV lasix with 1 unrecorded UO.     In the ED, vitals stable on 3L oxygen. Intake labs without acute abnormality. PD labs without infection. CXR with mild cardiomegaly with possible mild edema and bibasilar subsegmental atelectasis.    Overview/Hospital Course:  Patient admitted to observation for acute respiratory failure. Nephrology consulted for PD management. Pending final recs and resolution of fluid overload, likely home tomorrow.    Interval History: 1 run of SVT overnight that resolved. Pain in abdomen is improved this morning. Pain likely related to fluid overload. Answered all  questions for family and patient.    Review of Systems   Constitutional: Negative for chills and fever.   Respiratory: Negative for chest tightness and shortness of breath.    Cardiovascular: Negative for chest pain and leg swelling.   Gastrointestinal: Positive for abdominal distention and abdominal pain. Negative for nausea.   Neurological: Negative for dizziness and weakness.     Objective:     Vital Signs (Most Recent):  Temp: 97.4 °F (36.3 °C) (07/28/20 1538)  Pulse: 75 (07/28/20 1753)  Resp: 10 (07/28/20 1538)  BP: (!) 120/59 (07/28/20 1753)  SpO2: 97 % (07/28/20 1538) Vital Signs (24h Range):  Temp:  [96.8 °F (36 °C)-97.6 °F (36.4 °C)] 97.4 °F (36.3 °C)  Pulse:  [68-77] 75  Resp:  [10-22] 10  SpO2:  [96 %-100 %] 97 %  BP: ()/(47-80) 120/59     Weight: 86.2 kg (190 lb 0.6 oz)  Body mass index is 33.66 kg/m².    Intake/Output Summary (Last 24 hours) at 7/28/2020 1828  Last data filed at 7/28/2020 1700  Gross per 24 hour   Intake 940 ml   Output 689 ml   Net 251 ml      Physical Exam  Vitals signs and nursing note reviewed.   Constitutional:       Appearance: She is well-developed.      Interventions: Nasal cannula in place.   Neck:      Musculoskeletal: Normal range of motion and neck supple.   Cardiovascular:      Rate and Rhythm: Normal rate and regular rhythm.      Heart sounds: Normal heart sounds.   Pulmonary:      Effort: Pulmonary effort is normal. No respiratory distress.      Breath sounds: Examination of the right-lower field reveals decreased breath sounds and rales. Examination of the left-lower field reveals decreased breath sounds and rales. Decreased breath sounds and rales present. No wheezing.   Chest:      Chest wall: Tenderness (right and left lower ribs) present.   Abdominal:      General: Bowel sounds are normal. There is distension.      Palpations: Abdomen is soft.      Tenderness: There is no abdominal tenderness.   Musculoskeletal: Normal range of motion.         General: No  tenderness.   Lymphadenopathy:      Cervical: No cervical adenopathy.   Skin:     General: Skin is warm and dry.      Findings: No rash.   Neurological:      Mental Status: She is alert.   Psychiatric:         Behavior: Behavior normal.         Thought Content: Thought content normal.         Significant Labs:   BMP:   Recent Labs   Lab 07/28/20  0359   *      K 4.9      CO2 18*   BUN 81*   CREATININE 12.0*   CALCIUM 8.1*   MG 2.8*     CBC:   Recent Labs   Lab 07/27/20  0232 07/28/20  0359   WBC 8.36 8.56   HGB 10.8* 9.6*   HCT 36.1* 30.4*    222     All pertinent labs within the past 24 hours have been reviewed.    Significant Imaging: I have reviewed all pertinent imaging results/findings within the past 24 hours.      Assessment/Plan:      * Acute respiratory failure with hypoxia  ESRD  Chest pain  Chronic diastolic HF    Patient presents with shortness of breath and hypoxia due to fluid overload. She is on oxygen at home PRN but was requiring 3L after saturating 89% on room air.  - CXR with pulmonary edema  - troponins 0.049->0.046  - EKG without ischemic changes  Nephrology consulted  - PD successful last night, will repeat tonight  - suspect due to change in PD regimen  - strict Is/Os, daily weights  - renal diet  - daily renal labs  - admit weight 190lbs, repeat pending post PD    Type 2 diabetes mellitus with obesity  - hospital insulin: insulin detemir 5u qhs + low dose SSI ACHS  - home insulin: insulin glargine 12u qhs + sliding scale + glipizide 2.5mg  - hold orals    Partial symptomatic epilepsy with complex partial seizures, not intractable, without status epilepticus  - continue keppra    Essential hypertension  - continue losartan 50mg daily    Anemia in ESRD (end-stage renal disease)  - hgb at baseline, 10  - daily cbc    Slow transit constipation  - continue bowel regimen    Left hemiparesis  - s/p R MCA stroke  - at baseline    Sarcoidosis  - likely cause of chronic  respiratory failure      VTE Risk Mitigation (From admission, onward)         Ordered     IP VTE LOW RISK PATIENT  Once      07/27/20 0512                      Keisha Ybarra PA-C  Department of Hospital Medicine   [facility]

## 2020-07-28 NOTE — HPI
Patient is a 58yo F with ESRD on PD, HTN, DM2, CVA with residual hemiparesis (left), Recurrent AV fistula thrombosis/stenosis, CAD, THERESA, and sarcoidosis being admitted to observation for acute on chronic hypoxic respiratory failure. She presented to the ED 7/27/20 for abdominal pain that started Sunday AM at the end of her nightly Saturday - Sunday PD session. The pain is located in the RUQ around the area of the R lower rib. This is associated with bloating, nausea, and SOB. She normally makes some urine, but denies any UO for past day. She normally has 1 BM daily on home miralax, but denies BM for the past 24 hours. She denies fevers, chills, dysuria, hematuria, or any recent issues with her PD catheter. She reports her PD Rx was increased about 1 week ago from 1800 cc fills to 2200 cc fills. She reports abdominal discomfort with the increase in fill volumes. In the ED, she was given 100 IV lasix with 1 unrecorded UO.     In the ED, vitals stable on 3L oxygen. Intake labs without acute abnormality. PD labs without infection. CXR with mild cardiomegaly with possible mild edema and bibasilar subsegmental atelectasis.

## 2020-07-28 NOTE — NURSING
Pt is running Vtach on the monitor with HR in 200s, checked on patient, VSS. Auscultated radial pulses and listened to heart. Findings on telemetry monitor do not correlate with assessment. Pt states she is feeling weak and does not have any other symptoms. BECKA Valdes notified, stat EKG was ordered

## 2020-07-28 NOTE — PROGRESS NOTES
Dialysis       CCPD tx completed  ml  Cycler d/c'ed catheter capped using aseptic technique.  Fluid smaples collected and sent to lab

## 2020-07-28 NOTE — H&P
"[facility] Medicine  History & Physical    Patient Name: Lucy Perez  MRN: [id number]  Admission Date: 7/27/2020  Attending Physician: Matt Leon MD   Primary Care Provider: Beverly Muniz MD    [facility] Medicine Team: [facility] MED Y Keisha Ybarra PA-C     Patient information was obtained from patient and ER records.     Subjective:     Principal Problem:Acute respiratory failure with hypoxia    Chief Complaint:   Chief Complaint   Patient presents with    Shortness of Breath     Pt c/o SOB with pain to lower "ribs" and upper abdominal pain. Peritoneal dialysis patient. Denies CP but reports nausea.         HPI: Patient is a 58yo F with ESRD on PD, HTN, DM2, CVA with residual hemiparesis (left), Recurrent AV fistula thrombosis/stenosis, CAD, THERESA, and sarcoidosis being admitted to observation for acute on chronic hypoxic respiratory failure. She presented to the ED 7/27/20 for abdominal pain that started Sunday AM at the end of her nightly Saturday - Sunday PD session. The pain is located in the RUQ around the area of the R lower rib. This is associated with bloating, nausea, and SOB. She normally makes some urine, but denies any UO for past day. She normally has 1 BM daily on home miralax, but denies BM for the past 24 hours. She denies fevers, chills, dysuria, hematuria, or any recent issues with her PD catheter. She reports her PD Rx was increased about 1 week ago from 1800 cc fills to 2200 cc fills. She reports abdominal discomfort with the increase in fill volumes. In the ED, she was given 100 IV lasix with 1 unrecorded UO.     In the ED, vitals stable on 3L oxygen. Intake labs without acute abnormality. PD labs without infection. CXR with mild cardiomegaly with possible mild edema and bibasilar subsegmental atelectasis.    Past Medical History:   Diagnosis Date    Anemia of chronic disease 6/10/2017    Anxiety     Arthritis of right acromioclavicular " joint 7/2/2014    Asthma     Bipolar disorder     Bronchitis, acute     Cataract     Cholelithiasis     Chronic diastolic CHF (congestive heart failure)     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    Cortical cataract of both eyes 7/26/2016    Decubitus ulcer of buttock, stage 2     Degeneration of lumbar or lumbosacral intervertebral disc 3/5/2013    S/p MRI L-spine 5/2009     Depression     Encounter for blood transfusion     ESRD on hemodialysis     Started August 2018    General anesthetics causing adverse effect in therapeutic use     Hemorrhagic cerebrovascular accident (CVA)     8/2016 s/p Hemicraniotomy at [facility] with Left hemiparesis    History of stroke 6/28/2017    s/p R-MCA stroke with R-putaminal hemorrhagic transformation in 8/2016 and 11/2016 (s/p hemicraniotomy at [facility]) with residual L hemiparesis, on AED s/p CVA      Hypertensive retinopathy of both eyes 7/26/2016    Impingement syndrome of right shoulder 7/2/2014    Obesity     THERESA (obstructive sleep apnea) 3/5/2013    No Home CPAP 2ndary to cost     Partial symptomatic epilepsy with complex partial seizures, not intractable, without status epilepticus     Rheumatoid arthritis(714.0)     Rotator cuff tear 7/2/2014    Sarcoidosis     Stroke 2016    left sided flaccidity, SAH    Vertebral artery stenosis 3/5/2013    S/p Stenting per Dr Burnett        Past Surgical History:   Procedure Laterality Date    BREAST SURGERY      breast reduction    COLONOSCOPY N/A 8/11/2016    Procedure: COLONOSCOPY;  Surgeon: Jerry Vilchis MD;  [facility]  Service: Endoscopy;  Laterality: N/A;  Patient reports difficulty awaking from anesthesia in the past.    DECLOTTING OF VASCULAR GRAFT Right 6/20/2019    Procedure: DECLOT-GRAFT;  Surgeon: Cabrera Irwin MD;  Location: [facility] CATH LAB;  Service: Cardiology;  Laterality: Right;    DECLOTTING OF VASCULAR GRAFT Right 12/6/2019     Procedure: DECLOT-GRAFT;  Surgeon: Cabrera Irwin MD;  Location: [facility] OR [facility] FLR;  Service: Peripheral Vascular;  Laterality: Right;    DECLOTTING OF VASCULAR GRAFT N/A 6/10/2020    Procedure: Declotting, Vascular Graft;  Surgeon: SERENA Buchanan II, MD;  Location: [facility] CATH LAB;  Service: Vascular;  Laterality: N/A;    DIAGNOSTIC LAPAROSCOPY N/A 6/8/2020    Procedure: LAPAROSCOPY, DIAGNOSTIC (POSSIBLE PD CATH REVISION);  Surgeon: Paige Monsalve MD;  Location: [facility] OR [facility] FLR;  Service: General;  Laterality: N/A;    FISTULOGRAM Right 2/11/2019    Procedure: Fistulogram;  Surgeon: Meir Valencia MD;  Location: [facility] CATH LAB;  Service: Peripheral Vascular;  Laterality: Right;    FISTULOGRAM Right 7/8/2019    Procedure: Fistulogram;  Surgeon: WELLINGTON Palm III, MD;  Location: [facility] CATH LAB;  Service: Peripheral Vascular;  Laterality: Right;    FISTULOGRAM Right 12/6/2019    Procedure: Fistulogram;  Surgeon: Cabrera Irwin MD;  [address]  Service: Peripheral Vascular;  Laterality: Right;    FISTULOGRAM Right 3/12/2020    Procedure: FISTULOGRAM;  Surgeon: Meir Valencia MD;  Location: [facility] CATH LAB;  Service: Peripheral Vascular;  Laterality: Right;    HYSTERECTOMY  1999    JOSE LUIS/BSO (AUB)    LAPAROSCOPIC LYSIS OF ADHESIONS N/A 3/9/2020    Procedure: LYSIS, ADHESIONS, LAPAROSCOPIC;  Surgeon: Paige Monsalve MD;  [address]  Service: General;  Laterality: N/A;    LAPAROSCOPIC REVISION OF PROCEDURE INVOLVING PERITONEAL DIALYSIS CATHETER N/A 6/8/2020    Procedure: REVISION OF PROCEDURE INVOLVING PERITONEAL DIALYSIS CATHETER, LAPAROSCOPIC;  Surgeon: Paige Monsalve MD;  [facility];  Service: General;  Laterality: N/A;    PLACEMENT OF ARTERIOVENOUS GRAFT Right 10/18/2018    Procedure: AV GRAFT CREATION;  Surgeon: Meir Valencia MD;  [facility];  Service: Cardiovascular;  Laterality: Right;    ROTATOR  CUFF REPAIR Right July 9, 2014    right side    Skull surgery      Aneurysm    stent placed      in vertebral artery    TOTAL REDUCTION MAMMOPLASTY      TUBAL LIGATION         Review of patient's allergies indicates:   Allergen Reactions    Lisinopril Other (See Comments)     Angioedema      Vicodin [hydrocodone-acetaminophen] Rash     No problem with acetaminophen        No current facility-administered medications on file prior to encounter.      Current Outpatient Medications on File Prior to Encounter   Medication Sig    acetaminophen (TYLENOL) 325 MG tablet Take 2 tablets (650 mg total) by mouth every 6 (six) hours as needed for Pain.    aspirin (ECOTRIN) 81 MG EC tablet Take 81 mg by mouth every morning.     atorvastatin (LIPITOR) 40 MG tablet TAKE 1 TABLET ONE TIME DAILY FOR CHOLESTEROL    B,C/folic/zinc/selenometh/D3/E (RENAPLEX-D ORAL) Take by mouth.    bisacodyl (DULCOLAX) 10 mg Supp Place 1 suppository (10 mg total) rectally daily as needed.    diclofenac sodium (VOLTAREN) 1 % Gel Apply 2gm to toe or hip  2-3x/day as needed    ergocalciferol (ERGOCALCIFEROL) 50,000 unit Cap Take 1 capsule (50,000 Units total) by mouth every 7 days.    famotidine (PEPCID) 20 MG tablet Take 1 tablet (20 mg total) by mouth once daily.    FLUoxetine 20 MG capsule Take 1 capsule (20 mg total) by mouth once daily.    folic acid (FOLVITE) 1 MG tablet Take 1 tablet (1,000 mcg total) by mouth once daily.    glipiZIDE (GLUCOTROL) 2.5 MG TR24 Take 1 tablet (hold if less than 150) at lunch (non dialysis days)    insulin (LANTUS SOLOSTAR U-100 INSULIN) glargine 100 units/mL (3mL) SubQ pen Inject 12 units at night. (Patient taking differently: Inject 14 Units into the skin every evening. Inject 14 units at night.)    insulin aspart U-100 (NOVOLOG U-100 INSULIN ASPART) 100 unit/mL injection Use correction scale 180-230+1, 231-280+2, 281-330+3, 331-380+4, >380+5, max 15 units.    levETIRAcetam (KEPPRA) 1000 MG tablet  "Take 1 tablet (1,000 mg total) by mouth once daily. (Patient taking differently: Take 1,000 mg by mouth once daily. On non dialysis days)    levETIRAcetam (KEPPRA) 500 MG Tab Take 1 tablet (500 mg total) by mouth every Mon, Wed, Fri. Monday Wednesday and Friday after DIALYSIS take additional 500mg (Patient taking differently: Take 500 mg by mouth 2 (two) times daily. Twice daily on dialysis days)    losartan (COZAAR) 50 MG tablet Take 50 mg by mouth once daily.    oxyCODONE (OXY-IR) 5 mg Cap Take 1 capsule (5 mg total) by mouth every 6 (six) hours as needed for Pain.    polyethylene glycol (MIRALAX) 17 gram/dose powder Take 17 g by mouth 2 (two) times daily.    sevelamer carbonate (RENVELA) 800 mg Tab Take 800 mg by mouth 3 (three) times daily with meals.    TRUEPLUS LANCETS 33 gauge Misc     albuterol (VENTOLIN HFA) 90 mcg/actuation inhaler Inhale 2 puffs into the lungs every 6 (six) hours as needed for Wheezing. Rescue    BD INSULIN PEN NEEDLE UF SHORT 31 gauge x 5/16" Ndle USE TO INJECT NOVOLOG FLEXPEN BEFORE MEALS    blood sugar diagnostic Strp To check BG 4  times daily, to use with insurance preferred meter-true metrix    blood-glucose meter kit To check BG 4 times daily, to use with insurance preferred meter-true metrix    clopidogrel (PLAVIX) 75 mg tablet Take 1 tablet (75 mg total) by mouth once daily.    cyclobenzaprine (FLEXERIL) 5 MG tablet 1 tab Q8 hours as needed for back pain    dantrolene (DANTRIUM) 50 MG Cap Take one capsule three time daily for 1 week, then increase two tablet (Patient taking differently: Take 50 mg by mouth 3 (three) times daily. Take one capsule three time daily for 1 week, then increase two tablet)    lidocaine (LIDODERM) 5 % Place 1 patch onto the skin once daily. Remove & Discard patch within 12 hours or as directed by MD    LORazepam (ATIVAN) 0.5 MG tablet 1 tab 1 hour prior to Dialysis    ondansetron (ZOFRAN-ODT) 4 MG TbDL Take 1 tablet (4 mg total) by mouth " "every 8 (eight) hours as needed.    pen needle, diabetic (BD EMI 2ND GEN PEN NEEDLE) 32 gauge x 5/32" Ndle Uses once a day, 90 day    pregabalin (LYRICA) 25 MG capsule Take 1 capsule (25 mg total) by mouth once daily. May take additional dose after HD.    torsemide (DEMADEX) 20 MG Tab Take 20 mg by mouth once daily.    traMADoL (ULTRAM) 50 mg tablet Take 1 tablet (50 mg total) by mouth every 8 (eight) hours as needed for Pain. 1 tab every 8-12 hours as needed for pain     Family History     Problem Relation (Age of Onset)    Bell's palsy Sister    Blindness Maternal Grandmother    Breast cancer Mother    Diabetes Mother, Son,     Heart attack Mother    Heart disease Mother    Hypertension Mother, Sister    Lupus Sister    No Known Problems Daughter    Sleep apnea Sister    Stroke Sister, Maternal Aunt        Tobacco Use    Smoking status: Never Smoker    Smokeless tobacco: Never Used   Substance and Sexual Activity    Alcohol use: Yes     Frequency: Monthly or less     Drinks per session: 1 or 2     Binge frequency: Never     Comment: occasional wine cooler     Drug use: Never    Sexual activity: Yes     Partners: Male     Birth control/protection: Post-menopausal     Review of Systems   Constitutional: Negative for activity change, chills and fever.   HENT: Negative for trouble swallowing.    Eyes: Negative for photophobia and visual disturbance.   Respiratory: Positive for shortness of breath. Negative for cough and chest tightness.    Cardiovascular: Positive for chest pain. Negative for palpitations and leg swelling.   Gastrointestinal: Positive for abdominal distention and abdominal pain. Negative for constipation, diarrhea, nausea and vomiting.   Genitourinary: Negative for dysuria, frequency and hematuria.   Musculoskeletal: Negative for back pain, gait problem and neck pain.   Skin: Negative for rash and wound.   Neurological: Positive for weakness. Negative for dizziness, syncope, speech " difficulty and light-headedness.   Psychiatric/Behavioral: Negative for agitation and confusion. The patient is not nervous/anxious.      Objective:     Vital Signs (Most Recent):  Temp: 97.6 °F (36.4 °C) (07/27/20 0745)  Pulse: 75 (07/27/20 0745)  Resp: 20 (07/27/20 0745)  BP: (!) 173/71 (07/27/20 0745)  SpO2: 100 % (07/27/20 0745) Vital Signs (24h Range):  Temp:  [97.6 °F (36.4 °C)-98.6 °F (37 °C)] 97.6 °F (36.4 °C)  Pulse:  [69-85] 75  Resp:  [17-28] 20  SpO2:  [89 %-100 %] 100 %  BP: (117-179)/(59-87) 173/71     Weight: 86.2 kg (190 lb 0.6 oz)  Body mass index is 33.66 kg/m².    Physical Exam  Vitals signs and nursing note reviewed.   Constitutional:       General: She is not in acute distress.     Appearance: She is well-developed.      Interventions: Nasal cannula in place.   HENT:      Head: Normocephalic and atraumatic.      Mouth/Throat:      Pharynx: No oropharyngeal exudate.   Eyes:      Conjunctiva/sclera: Conjunctivae normal.      Pupils: Pupils are equal, round, and reactive to light.   Neck:      Musculoskeletal: Normal range of motion and neck supple.   Cardiovascular:      Rate and Rhythm: Normal rate and regular rhythm.      Heart sounds: Normal heart sounds.   Pulmonary:      Effort: Pulmonary effort is normal. No respiratory distress.      Breath sounds: Examination of the right-lower field reveals decreased breath sounds and rales. Examination of the left-lower field reveals decreased breath sounds and rales. Decreased breath sounds and rales present. No wheezing.   Chest:      Chest wall: Tenderness (right and left lower ribs) present.   Abdominal:      General: Bowel sounds are normal. There is no distension.      Palpations: Abdomen is soft.      Tenderness: There is no abdominal tenderness.   Musculoskeletal: Normal range of motion.         General: No tenderness.   Lymphadenopathy:      Cervical: No cervical adenopathy.   Skin:     General: Skin is warm and dry.      Capillary Refill:  Capillary refill takes less than 2 seconds.      Findings: No rash.   Neurological:      Mental Status: She is alert and oriented to person, place, and time.      Cranial Nerves: No cranial nerve deficit.      Sensory: No sensory deficit.      Coordination: Coordination normal.   Psychiatric:         Behavior: Behavior normal.         Thought Content: Thought content normal.         Judgment: Judgment normal.           CRANIAL NERVES     CN III, IV, VI   Pupils are equal, round, and reactive to light.       Significant Labs:   BMP:   Recent Labs   Lab 07/27/20  0232   *   *   K 4.5      CO2 20*   BUN 78*   CREATININE 11.7*   CALCIUM 9.0   MG 3.0*     CBC:   Recent Labs   Lab 07/27/20  0232   WBC 8.36   HGB 10.8*   HCT 36.1*        All pertinent labs within the past 24 hours have been reviewed.    Significant Imaging: I have reviewed all pertinent imaging results/findings within the past 24 hours.    Assessment/Plan:     * Acute respiratory failure with hypoxia  ESRD  Chest pain  Chronic diastolic HF    Patient presents with shortness of breath and hypoxia due to fluid overload. She is on oxygen at home PRN but was requiring 3L after saturating 89% on room air.  - CXR with pulmonary edema  - troponins 0.049->0.046  - EKG without ischemic changes  Nephrology consulted  - PD tonight  - suspect due to change in PD regimen  - strict Is/Os, daily weights  - renal diet  - daily renal labs  - admit weight 190lbs, repeat pending post PD    Type 2 diabetes mellitus with obesity  - hospital insulin: insulin detemir 5u qhs + low dose SSI ACHS  - home insulin: insulin glargine 12u qhs + sliding scale + glipizide 2.5mg  - hold orals    Partial symptomatic epilepsy with complex partial seizures, not intractable, without status epilepticus  - continue keppra    Essential hypertension  - continue losartan 50mg daily    Anemia in ESRD (end-stage renal disease)  - hgb at baseline, 10  - daily cbc    Slow  transit constipation  - continue bowel regimen    Left hemiparesis  - s/p R MCA stroke  - at baseline    Sarcoidosis  - likely cause of chronic respiratory failure    VTE Risk Mitigation (From admission, onward)         Ordered     IP VTE LOW RISK PATIENT  Once      07/27/20 0512                   TRAV NguyenC  Department of Hospital Medicine   [facility]

## 2020-07-28 NOTE — ASSESSMENT & PLAN NOTE
- hospital insulin: insulin detemir 5u qhs + low dose SSI ACHS  - home insulin: insulin glargine 12u qhs + sliding scale + glipizide 2.5mg  - hold orals

## 2020-07-28 NOTE — SUBJECTIVE & OBJECTIVE
Interval History: 1 run of SVT overnight that resolved. Pain in abdomen is improved this morning. Pain likely related to fluid overload. Answered all questions for family and patient.    Review of Systems   Constitutional: Negative for chills and fever.   Respiratory: Negative for chest tightness and shortness of breath.    Cardiovascular: Negative for chest pain and leg swelling.   Gastrointestinal: Positive for abdominal distention and abdominal pain. Negative for nausea.   Neurological: Negative for dizziness and weakness.     Objective:     Vital Signs (Most Recent):  Temp: 97.4 °F (36.3 °C) (07/28/20 1538)  Pulse: 75 (07/28/20 1753)  Resp: 10 (07/28/20 1538)  BP: (!) 120/59 (07/28/20 1753)  SpO2: 97 % (07/28/20 1538) Vital Signs (24h Range):  Temp:  [96.8 °F (36 °C)-97.6 °F (36.4 °C)] 97.4 °F (36.3 °C)  Pulse:  [68-77] 75  Resp:  [10-22] 10  SpO2:  [96 %-100 %] 97 %  BP: ()/(47-80) 120/59     Weight: 86.2 kg (190 lb 0.6 oz)  Body mass index is 33.66 kg/m².    Intake/Output Summary (Last 24 hours) at 7/28/2020 1828  Last data filed at 7/28/2020 1700  Gross per 24 hour   Intake 940 ml   Output 689 ml   Net 251 ml      Physical Exam  Vitals signs and nursing note reviewed.   Constitutional:       Appearance: She is well-developed.      Interventions: Nasal cannula in place.   Neck:      Musculoskeletal: Normal range of motion and neck supple.   Cardiovascular:      Rate and Rhythm: Normal rate and regular rhythm.      Heart sounds: Normal heart sounds.   Pulmonary:      Effort: Pulmonary effort is normal. No respiratory distress.      Breath sounds: Examination of the right-lower field reveals decreased breath sounds and rales. Examination of the left-lower field reveals decreased breath sounds and rales. Decreased breath sounds and rales present. No wheezing.   Chest:      Chest wall: Tenderness (right and left lower ribs) present.   Abdominal:      General: Bowel sounds are normal. There is distension.       Palpations: Abdomen is soft.      Tenderness: There is no abdominal tenderness.   Musculoskeletal: Normal range of motion.         General: No tenderness.   Lymphadenopathy:      Cervical: No cervical adenopathy.   Skin:     General: Skin is warm and dry.      Findings: No rash.   Neurological:      Mental Status: She is alert.   Psychiatric:         Behavior: Behavior normal.         Thought Content: Thought content normal.         Significant Labs:   BMP:   Recent Labs   Lab 07/28/20  0359   *      K 4.9      CO2 18*   BUN 81*   CREATININE 12.0*   CALCIUM 8.1*   MG 2.8*     CBC:   Recent Labs   Lab 07/27/20  0232 07/28/20  0359   WBC 8.36 8.56   HGB 10.8* 9.6*   HCT 36.1* 30.4*    222     All pertinent labs within the past 24 hours have been reviewed.    Significant Imaging: I have reviewed all pertinent imaging results/findings within the past 24 hours.

## 2020-07-28 NOTE — ASSESSMENT & PLAN NOTE
ESRD  Chest pain  Chronic diastolic HF    Patient presents with shortness of breath and hypoxia due to fluid overload. She is on oxygen at home PRN but was requiring 3L after saturating 89% on room air.  - CXR with pulmonary edema  - troponins 0.049->0.046  - EKG without ischemic changes  Nephrology consulted  - PD tonight  - suspect due to change in PD regimen  - strict Is/Os, daily weights  - renal diet  - daily renal labs  - admit weight 190lbs, repeat pending post PD

## 2020-07-28 NOTE — PROGRESS NOTES
CCPD treatment initiated. initial effluent clear, drainage amount 9mL. CCPD dressing site change, PD site free of s/s of infection. See flowsheet for details.

## 2020-07-28 NOTE — SUBJECTIVE & OBJECTIVE
Interval History: Pt seen and examined at bedside. Reports abdominal pain improving with decrease in PD fill volume. She reports increased UO.     Objective:     Vital Signs (Most Recent):  Temp: 96.8 °F (36 °C) (07/28/20 0719)  Pulse: 68 (07/28/20 0727)  Resp: 16 (07/28/20 0719)  BP: 130/64 (07/28/20 0727)  SpO2: 97 % (07/28/20 0727)  O2 Device (Oxygen Therapy): nasal cannula (07/27/20 2010) Vital Signs (24h Range):  Temp:  [96.2 °F (35.7 °C)-97.6 °F (36.4 °C)] 96.8 °F (36 °C)  Pulse:  [68-86] 68  Resp:  [16-22] 16  SpO2:  [96 %-100 %] 97 %  BP: (121-192)/(58-83) 130/64     Weight: 86.2 kg (190 lb 0.6 oz) (07/27/20 0930)  Body mass index is 33.66 kg/m².  Body surface area is 1.96 meters squared.    I/O last 3 completed shifts:  In: 1042 [P.O.:1042]  Out: 100 [Urine:100]    Physical Exam  Constitutional:       General: She is not in acute distress.     Appearance: She is well-developed.   HENT:      Head: Normocephalic and atraumatic.      Nose: Nose normal.      Mouth/Throat:      Pharynx: No oropharyngeal exudate.   Eyes:      General: No scleral icterus.     Conjunctiva/sclera: Conjunctivae normal.      Pupils: Pupils are equal, round, and reactive to light.   Neck:      Musculoskeletal: Neck supple.      Thyroid: No thyromegaly.   Cardiovascular:      Rate and Rhythm: Normal rate and regular rhythm.      Heart sounds: Normal heart sounds. No murmur.   Pulmonary:      Effort: Pulmonary effort is normal. No respiratory distress.      Breath sounds: Normal breath sounds. No wheezing or rales.   Abdominal:      General: Bowel sounds are normal. There is no distension.      Palpations: Abdomen is soft.      Tenderness: There is no abdominal tenderness.      Comments: PD catheter in place   Musculoskeletal:         General: No tenderness or deformity.   Lymphadenopathy:      Cervical: No cervical adenopathy.   Skin:     General: Skin is warm and dry.   Neurological:      Mental Status: She is alert and oriented to  "person, place, and time.      Cranial Nerves: No cranial nerve deficit.         Significant Labs:  CBC:   Recent Labs   Lab 07/28/20  0359   WBC 8.56   RBC 3.12*   HGB 9.6*   HCT 30.4*      MCV 97   MCH 30.8   MCHC 31.6*     CMP:   Recent Labs   Lab 07/27/20  0232 07/28/20  0359   * 142*   CALCIUM 9.0 8.1*   ALBUMIN 3.7  --    PROT 8.1  --    * 144   K 4.5 4.9   CO2 20* 18*    105   BUN 78* 81*   CREATININE 11.7* 12.0*   ALKPHOS 110  --    ALT 5*  --    AST 10  --    BILITOT 0.5  --      All labs within the past 24 hours have been reviewed.    Significant Imaging:  Imaging Results          X-Ray Chest AP Portable (Final result)  Result time 07/27/20 03:09:23    Final result by Maycol Rodriguez MD (07/27/20 03:09:23)                 Impression:      Mild cardiomegaly with possible mild edema and bibasilar subsegmental atelectasis.  No detrimental change when compared with 06/13/2020.      Electronically signed by: Maycol Rodriguez MD  Date:    07/27/2020  Time:    03:09             Narrative:    EXAMINATION:  XR CHEST AP PORTABLE    CLINICAL HISTORY:  Provided history is "Epigastric pain, hypoxia.;  ".    TECHNIQUE:  One view of the chest.    COMPARISON:  06/13/2020.    FINDINGS:  Cardiac wires overlie the chest.  Lung volumes are low.  Right hemidiaphragm is mildly elevated.  Cardiomediastinal silhouette is mildly enlarged but stable.  Atherosclerotic calcifications overlie the aortic arch.  Prominent perihilar interstitial lung markings and platelike subsegmental atelectasis in the lower lung zones.  No confluent area of consolidation.  No sizable pleural effusion.  No pneumothorax.  Right subclavian vascular stent again noted.                                "

## 2020-07-28 NOTE — NURSING
Informed per KARINA Ku to call for any alarms on pt peritoneal dialysis and always reassess  5 min after alarm clearance to ensure proper functioning with dialysis device. Will continue to monitor. [facility] Ext [phone]

## 2020-07-28 NOTE — ASSESSMENT & PLAN NOTE
ESRD  Chest pain  Chronic diastolic HF    Patient presents with shortness of breath and hypoxia due to fluid overload. She is on oxygen at home PRN but was requiring 3L after saturating 89% on room air.  - CXR with pulmonary edema  - troponins 0.049->0.046  - EKG without ischemic changes  Nephrology consulted  - PD successful last night, will repeat tonight  - suspect due to change in PD regimen  - strict Is/Os, daily weights  - renal diet  - daily renal labs  - admit weight 190lbs, repeat pending post PD

## 2020-07-28 NOTE — PLAN OF CARE
Pt AAO x4. Pt skin warm, dry and intact. Pt NAD; respirations 16, even and unlabored. Pt has oxygen 2L via NC. Pt IV access flushes without difficulty. Pt receiving peritoneal dialysis. Pt tolerating well. Pt turned Q2 hrs per max assist x 2 staff. Pt admits pain but refuses any PRN pain medication. Pt remains free of any falls or injury. Pt has purwick in place with 200 ml of output; clear yellow urine. Pt bed wheels locked, SR up x 2, bed in lowest position, and Call light within reach. Will continue to monitor.

## 2020-07-28 NOTE — HOSPITAL COURSE
Patient admitted to observation for acute respiratory failure. Nephrology consulted for PD management and underwent one PD treatment overnight.  Nephro recommends changing dialysate to 2000L.  Patient with multiple BMs on lactulose TID and miralax BID and symptoms improved.  Patient instructed to continue miralax BID and prn lactulose prescribed at discharge.  Patient to follow up with nephrology to adjust PD machine for recommended fluid dosing. Patient and  at bedside acknowledge understanding and ready to return home.

## 2020-07-29 LAB
ANION GAP SERPL CALC-SCNC: 17 MMOL/L (ref 8–16)
BASOPHILS # BLD AUTO: 0.04 K/UL (ref 0–0.2)
BASOPHILS NFR BLD: 0.6 % (ref 0–1.9)
BUN SERPL-MCNC: 79 MG/DL (ref 8–23)
CALCIUM SERPL-MCNC: 9.1 MG/DL (ref 8.7–10.5)
CHLORIDE SERPL-SCNC: 102 MMOL/L (ref 95–110)
CO2 SERPL-SCNC: 23 MMOL/L (ref 23–29)
CREAT SERPL-MCNC: 11.4 MG/DL (ref 0.5–1.4)
DIFFERENTIAL METHOD: ABNORMAL
EOSINOPHIL # BLD AUTO: 0.2 K/UL (ref 0–0.5)
EOSINOPHIL NFR BLD: 3.1 % (ref 0–8)
ERYTHROCYTE [DISTWIDTH] IN BLOOD BY AUTOMATED COUNT: 14.6 % (ref 11.5–14.5)
EST. GFR  (AFRICAN AMERICAN): 3.7 ML/MIN/1.73 M^2
EST. GFR  (NON AFRICAN AMERICAN): 3.2 ML/MIN/1.73 M^2
GLUCOSE SERPL-MCNC: 176 MG/DL (ref 70–110)
HCT VFR BLD AUTO: 32.6 % (ref 37–48.5)
HGB BLD-MCNC: 9.9 G/DL (ref 12–16)
IMM GRANULOCYTES # BLD AUTO: 0.05 K/UL (ref 0–0.04)
IMM GRANULOCYTES NFR BLD AUTO: 0.7 % (ref 0–0.5)
LYMPHOCYTES # BLD AUTO: 1.2 K/UL (ref 1–4.8)
LYMPHOCYTES NFR BLD: 17.8 % (ref 18–48)
MAGNESIUM SERPL-MCNC: 3.2 MG/DL (ref 1.6–2.6)
MCH RBC QN AUTO: 30.8 PG (ref 27–31)
MCHC RBC AUTO-ENTMCNC: 30.4 G/DL (ref 32–36)
MCV RBC AUTO: 102 FL (ref 82–98)
MONOCYTES # BLD AUTO: 0.6 K/UL (ref 0.3–1)
MONOCYTES NFR BLD: 9.1 % (ref 4–15)
NEUTROPHILS # BLD AUTO: 4.7 K/UL (ref 1.8–7.7)
NEUTROPHILS NFR BLD: 68.7 % (ref 38–73)
NRBC BLD-RTO: 0 /100 WBC
PHOSPHATE SERPL-MCNC: 7 MG/DL (ref 2.7–4.5)
PLATELET # BLD AUTO: 236 K/UL (ref 150–350)
PMV BLD AUTO: 10.8 FL (ref 9.2–12.9)
POCT GLUCOSE: 179 MG/DL (ref 70–110)
POCT GLUCOSE: 181 MG/DL (ref 70–110)
POCT GLUCOSE: 214 MG/DL (ref 70–110)
POCT GLUCOSE: 224 MG/DL (ref 70–110)
POTASSIUM SERPL-SCNC: 4.7 MMOL/L (ref 3.5–5.1)
RBC # BLD AUTO: 3.21 M/UL (ref 4–5.4)
SODIUM SERPL-SCNC: 142 MMOL/L (ref 136–145)
WBC # BLD AUTO: 6.8 K/UL (ref 3.9–12.7)

## 2020-07-29 PROCEDURE — 96372 THER/PROPH/DIAG INJ SC/IM: CPT

## 2020-07-29 PROCEDURE — 80048 BASIC METABOLIC PNL TOTAL CA: CPT | Mod: HCNC

## 2020-07-29 PROCEDURE — 99900035 HC TECH TIME PER 15 MIN (STAT): Mod: HCNC

## 2020-07-29 PROCEDURE — 90945 DIALYSIS ONE EVALUATION: CPT | Mod: HCNC

## 2020-07-29 PROCEDURE — 94761 N-INVAS EAR/PLS OXIMETRY MLT: CPT | Mod: HCNC

## 2020-07-29 PROCEDURE — 63600175 PHARM REV CODE 636 W HCPCS: Mod: HCNC | Performed by: PHYSICIAN ASSISTANT

## 2020-07-29 PROCEDURE — 36415 COLL VENOUS BLD VENIPUNCTURE: CPT | Mod: HCNC

## 2020-07-29 PROCEDURE — 99226 PR SUBSEQUENT OBSERVATION CARE,LEVEL III: CPT | Mod: HCNC,,, | Performed by: PHYSICIAN ASSISTANT

## 2020-07-29 PROCEDURE — 99226 PR SUBSEQUENT OBSERVATION CARE,LEVEL III: ICD-10-PCS | Mod: HCNC,,, | Performed by: PHYSICIAN ASSISTANT

## 2020-07-29 PROCEDURE — G0378 HOSPITAL OBSERVATION PER HR: HCPCS | Mod: HCNC

## 2020-07-29 PROCEDURE — 85025 COMPLETE CBC W/AUTO DIFF WBC: CPT | Mod: HCNC

## 2020-07-29 PROCEDURE — 27000221 HC OXYGEN, UP TO 24 HOURS: Mod: HCNC

## 2020-07-29 PROCEDURE — 25000003 PHARM REV CODE 250: Mod: HCNC | Performed by: PHYSICIAN ASSISTANT

## 2020-07-29 PROCEDURE — 83735 ASSAY OF MAGNESIUM: CPT | Mod: HCNC

## 2020-07-29 PROCEDURE — 11000001 HC ACUTE MED/SURG PRIVATE ROOM: Mod: HCNC

## 2020-07-29 PROCEDURE — 84100 ASSAY OF PHOSPHORUS: CPT | Mod: HCNC

## 2020-07-29 RX ORDER — LACTULOSE 10 G/15ML
20 SOLUTION ORAL ONCE
Status: DISCONTINUED | OUTPATIENT
Start: 2020-07-29 | End: 2020-07-29

## 2020-07-29 RX ORDER — LACTULOSE 10 G/15ML
20 SOLUTION ORAL 3 TIMES DAILY
Status: DISCONTINUED | OUTPATIENT
Start: 2020-07-29 | End: 2020-07-30 | Stop reason: HOSPADM

## 2020-07-29 RX ORDER — LACTULOSE 10 G/15ML
20 SOLUTION ORAL ONCE
Status: COMPLETED | OUTPATIENT
Start: 2020-07-29 | End: 2020-07-29

## 2020-07-29 RX ADMIN — LOSARTAN POTASSIUM 50 MG: 50 TABLET ORAL at 09:07

## 2020-07-29 RX ADMIN — INSULIN ASPART 2 UNITS: 100 INJECTION, SOLUTION INTRAVENOUS; SUBCUTANEOUS at 11:07

## 2020-07-29 RX ADMIN — TORSEMIDE 20 MG: 20 TABLET ORAL at 09:07

## 2020-07-29 RX ADMIN — INSULIN DETEMIR 5 UNITS: 100 INJECTION, SOLUTION SUBCUTANEOUS at 09:07

## 2020-07-29 RX ADMIN — ATORVASTATIN CALCIUM 40 MG: 40 TABLET, FILM COATED ORAL at 11:07

## 2020-07-29 RX ADMIN — SEVELAMER CARBONATE 800 MG: 800 TABLET, FILM COATED ORAL at 09:07

## 2020-07-29 RX ADMIN — INSULIN ASPART 1 UNITS: 100 INJECTION, SOLUTION INTRAVENOUS; SUBCUTANEOUS at 09:07

## 2020-07-29 RX ADMIN — POLYETHYLENE GLYCOL 3350 17 G: 17 POWDER, FOR SOLUTION ORAL at 09:07

## 2020-07-29 RX ADMIN — SEVELAMER CARBONATE 800 MG: 800 TABLET, FILM COATED ORAL at 04:07

## 2020-07-29 RX ADMIN — CLOPIDOGREL 75 MG: 75 TABLET, FILM COATED ORAL at 09:07

## 2020-07-29 RX ADMIN — FLUOXETINE 20 MG: 20 CAPSULE ORAL at 09:07

## 2020-07-29 RX ADMIN — LACTULOSE 20 G: 20 SOLUTION ORAL at 11:07

## 2020-07-29 RX ADMIN — ASPIRIN 81 MG: 81 TABLET, COATED ORAL at 06:07

## 2020-07-29 RX ADMIN — LEVETIRACETAM 1000 MG: 500 TABLET ORAL at 09:07

## 2020-07-29 NOTE — PROGRESS NOTES
[facility] Hospital Medicine  Progress Note    Patient Name: Lucy Perez  MRN: [id number]  Patient Class: OP- Observation   Admission Date: 7/27/2020  Length of Stay: 0 days  Attending Physician: Matt Leon MD  Primary Care Provider: Beverly Muniz MD    [facility] Medicine Team: [facility] MED Y Keisha Ybarra PA-C    Subjective:     Principal Problem:Acute respiratory failure with hypoxia        HPI:  Patient is a 60yo F with ESRD on PD, HTN, DM2, CVA with residual hemiparesis (left), Recurrent AV fistula thrombosis/stenosis, CAD, THERESA, and sarcoidosis being admitted to observation for acute on chronic hypoxic respiratory failure. She presented to the ED 7/27/20 for abdominal pain that started Sunday AM at the end of her nightly Saturday - Sunday PD session. The pain is located in the RUQ around the area of the R lower rib. This is associated with bloating, nausea, and SOB. She normally makes some urine, but denies any UO for past day. She normally has 1 BM daily on home miralax, but denies BM for the past 24 hours. She denies fevers, chills, dysuria, hematuria, or any recent issues with her PD catheter. She reports her PD Rx was increased about 1 week ago from 1800 cc fills to 2200 cc fills. She reports abdominal discomfort with the increase in fill volumes. In the ED, she was given 100 IV lasix with 1 unrecorded UO.     In the ED, vitals stable on 3L oxygen. Intake labs without acute abnormality. PD labs without infection. CXR with mild cardiomegaly with possible mild edema and bibasilar subsegmental atelectasis.    Overview/Hospital Course:  Patient admitted to observation for acute respiratory failure. Nephrology consulted for PD management. Pending final recs and resolution of fluid overload, likely home tomorrow. She has ongoing constipation, started on bowel regimen.    Interval History: No events overnight. She reports ongoing constipation. Nephrology wants 1 more PD  session before discharge.    Review of Systems   Constitutional: Negative for chills and fever.   Respiratory: Negative for chest tightness and shortness of breath.    Cardiovascular: Negative for chest pain and leg swelling.   Gastrointestinal: Positive for abdominal distention and abdominal pain. Negative for nausea.   Neurological: Negative for dizziness and weakness.     Objective:     Vital Signs (Most Recent):  Temp: 97 °F (36.1 °C) (07/29/20 1214)  Pulse: 76 (07/29/20 1319)  Resp: 16 (07/29/20 1319)  BP: (!) 145/69 (07/29/20 1214)  SpO2: 96 % (07/29/20 1319) Vital Signs (24h Range):  Temp:  [96 °F (35.6 °C)-97.9 °F (36.6 °C)] 97 °F (36.1 °C)  Pulse:  [61-76] 76  Resp:  [10-20] 16  SpO2:  [93 %-100 %] 96 %  BP: ()/(47-69) 145/69     Weight: 86.2 kg (190 lb 0.6 oz)  Body mass index is 33.66 kg/m².    Intake/Output Summary (Last 24 hours) at 7/29/2020 1517  Last data filed at 7/28/2020 1700  Gross per 24 hour   Intake 300 ml   Output --   Net 300 ml      Physical Exam  Vitals signs and nursing note reviewed.   Constitutional:       Appearance: She is well-developed.      Interventions: Nasal cannula in place.   Neck:      Musculoskeletal: Normal range of motion and neck supple.   Cardiovascular:      Rate and Rhythm: Normal rate and regular rhythm.      Heart sounds: Normal heart sounds.   Pulmonary:      Effort: Pulmonary effort is normal. No respiratory distress.      Breath sounds: Examination of the right-lower field reveals decreased breath sounds. Examination of the left-lower field reveals decreased breath sounds. Decreased breath sounds present. No wheezing or rales.   Chest:      Chest wall: No tenderness (right and left lower ribs).   Abdominal:      General: Bowel sounds are normal. There is distension.      Palpations: Abdomen is soft.      Tenderness: There is no abdominal tenderness.   Musculoskeletal: Normal range of motion.         General: No tenderness.   Lymphadenopathy:      Cervical:  No cervical adenopathy.   Skin:     General: Skin is warm and dry.   Neurological:      Mental Status: She is alert.   Psychiatric:         Behavior: Behavior normal.         Thought Content: Thought content normal.         Significant Labs:   BMP:   Recent Labs   Lab 07/29/20  0544   *      K 4.7      CO2 23   BUN 79*   CREATININE 11.4*   CALCIUM 9.1   MG 3.2*     CBC:   Recent Labs   Lab 07/28/20  0359 07/29/20  0544   WBC 8.56 6.80   HGB 9.6* 9.9*   HCT 30.4* 32.6*    236     All pertinent labs within the past 24 hours have been reviewed.    Significant Imaging: I have reviewed all pertinent imaging results/findings within the past 24 hours.      Assessment/Plan:      * Acute respiratory failure with hypoxia  ESRD  Chest pain  Chronic diastolic HF    Patient presents with shortness of breath and hypoxia due to fluid overload. She is on oxygen at home PRN but was requiring 3L after saturating 89% on room air.  - CXR with pulmonary edema  - troponins 0.049->0.046  - EKG without ischemic changes  Nephrology consulted  - PD successful x 2, will repeat tonight  - suspect due to change in PD regimen  - strict Is/Os, daily weights  - daily renal labs  - admit weight 190lbs, repeat pending post PD  - anticipate discharge tomorrow    Type 2 diabetes mellitus with obesity  - hospital insulin: insulin detemir 5u qhs + low dose SSI ACHS  - home insulin: insulin glargine 12u qhs + sliding scale + glipizide 2.5mg  - hold orals    Partial symptomatic epilepsy with complex partial seizures, not intractable, without status epilepticus  - continue keppra    Essential hypertension  - continue losartan 50mg daily    Anemia in ESRD (end-stage renal disease)  - hgb at baseline, 10  - daily cbc    Slow transit constipation  - continue bowel regimen  - added lactulose TID  - awaiting BM    Left hemiparesis  - s/p R MCA stroke  - at baseline    Sarcoidosis  - likely cause of chronic respiratory failure      VTE  Risk Mitigation (From admission, onward)         Ordered     IP VTE LOW RISK PATIENT  Once      07/27/20 0512                      Keisha Ybarra PA-C  Department of Hospital Medicine   [facility]

## 2020-07-29 NOTE — ASSESSMENT & PLAN NOTE
59 yro F with ESRD on PD, HTN, DM2 and Recurrent AV fistula thrombosis/stenosis who presented 7/27/20 for abdominal pain that started Sunday AM at the end of her nightly Saturday - Sunday PD session. PD Rx was increased ~1 week ago from 1800 cc fills to 2200 cc fills. Nephrology is consulted for abdominal pain in PD patient and in-pt management of PD and ESRD.     Home Rx  Nightly peritoneal dialysis  MD: Brandi Bower  HD Ctr: [facility]  Fill volume: 2200 cc  Number of exchanges: 5  Bags: Alternating 2.5% and 1.5% bags  Duration: 10 hours    -Last BM Saturday per patient. Currently on Miramax BID. Refused nigh time dose yesterday. Recommend increase bowel regimen; ongoing abdominal discomfort possibly 2/2 constipation   -Repeat peritoneal fluid sample does not show evidence of peritonitis  -Will plan on PD tonight with 2L fills, rest of Rx will remain as above  -Strict IOs  -Trend RFPs, renally dose mediations, avoid nephrotoxins  -Please see attending attestation to follow

## 2020-07-29 NOTE — PLAN OF CARE
Patient's VSS, AAO, Turn q2 and weigh shifted with use of pillows/wedge. Blood glucose maintained and monitored throughout shift. DM with 1.5L diet continued, strict I & Os. Continuing to monitor patient for changes, Q2 turns, and call light in reach.  is @ bedside with patient throughout the day.

## 2020-07-29 NOTE — ASSESSMENT & PLAN NOTE
ESRD  Chest pain  Chronic diastolic HF    Patient presents with shortness of breath and hypoxia due to fluid overload. She is on oxygen at home PRN but was requiring 3L after saturating 89% on room air.  - CXR with pulmonary edema  - troponins 0.049->0.046  - EKG without ischemic changes  Nephrology consulted  - PD successful x 2, will repeat tonight  - suspect due to change in PD regimen  - strict Is/Os, daily weights  - daily renal labs  - admit weight 190lbs, repeat pending post PD  - anticipate discharge tomorrow

## 2020-07-29 NOTE — PROGRESS NOTES
[facility]  Nephrology  Progress Note    Patient Name: Lucy Perez  MRN: [id number]  Admission Date: 7/27/2020  Hospital Length of Stay: 0 days  Attending Provider: Matt Leon MD   Primary Care Physician: Beverly Muniz MD  Principal Problem:Acute respiratory failure with hypoxia    Subjective:     HPI: 59 yro F with ESRD on PD, HTN, DM2, CVA with residual hemiparesis (left), Recurrent AV fistula thrombosis/stenosis, CAD, THERESA, and sarcoidosis who presented to the ED 7/27/20 for abdominal pain that started Sunday AM at the end of her nightly Saturday - Sunday PD session. The pain is located in the RUQ around the area of the R lower rib. This is associated with bloating, nausea, and SOB. She normally makes some urine, but denies any UO for past day. She normally has 1 BM daily on home miralax, but denies BM for the past 24 hours. She denies fevers, chills, dysuria, hematuria, or any recent issues with her PD catheter. She reports her PD Rx was increased about 1 week ago from 1800 cc fills to 2200 cc fills. She reports abdominal discomfort with the increase in fill volumes. In the ED, she was given 100 IV lasix with 1 unrecorded UO.     Home Rx  Nightly peritoneal dialysis  MD: Brandi Bower  HD Ctr: [facility]  Fill volume: 2200 cc  Number of exchanges: 5  Bags: Alternating 2.5% and 1.5% bags  Duration: 10 hours        Interval History: Pt seen and examined at bedside. Reports ongoing RUQ abdomen discomfort, not worsening, but not resolved after decreasing PD fill volume. Received PD overnight, patient denies any issues with dialysis.  Repeat peritoneal fluid studies do not show evidence of peritonitis. Pt reports last BM was Saturday. On miralax BID, however refused night time dose yesterday.    Objective:     Vital Signs (Most Recent):  Temp: 96 °F (35.6 °C) (07/29/20 0738)  Pulse: 63 (07/29/20 0738)  Resp: 14 (07/29/20 0738)  BP: 136/63 (07/29/20 0738)  SpO2: 97 % (07/29/20  0738)  O2 Device (Oxygen Therapy): nasal cannula (07/28/20 2004) Vital Signs (24h Range):  Temp:  [96 °F (35.6 °C)-97.9 °F (36.6 °C)] 96 °F (35.6 °C)  Pulse:  [61-77] 63  Resp:  [10-20] 14  SpO2:  [93 %-100 %] 97 %  BP: ()/(47-67) 136/63     Weight: 86.2 kg (190 lb 0.6 oz) (07/27/20 0930)  Body mass index is 33.66 kg/m².  Body surface area is 1.96 meters squared.    I/O last 3 completed shifts:  In: 700 [P.O.:700]  Out: 689 [Other:689]    Physical Exam  Constitutional:       General: She is not in acute distress.     Appearance: She is well-developed.   HENT:      Head: Normocephalic and atraumatic.      Nose: Nose normal.      Mouth/Throat:      Pharynx: No oropharyngeal exudate.   Eyes:      General: No scleral icterus.     Conjunctiva/sclera: Conjunctivae normal.      Pupils: Pupils are equal, round, and reactive to light.   Neck:      Musculoskeletal: Neck supple.      Thyroid: No thyromegaly.   Cardiovascular:      Rate and Rhythm: Normal rate and regular rhythm.      Heart sounds: Normal heart sounds. No murmur.   Pulmonary:      Effort: Pulmonary effort is normal. No respiratory distress.      Breath sounds: Normal breath sounds. No wheezing or rales.   Abdominal:      General: Bowel sounds are normal. There is no distension.      Palpations: Abdomen is soft.      Tenderness: There is no abdominal tenderness.      Comments: PD catheter in place   Musculoskeletal:         General: No tenderness or deformity.   Lymphadenopathy:      Cervical: No cervical adenopathy.   Skin:     General: Skin is warm and dry.   Neurological:      Mental Status: She is alert and oriented to person, place, and time.      Cranial Nerves: No cranial nerve deficit.         Significant Labs:  CBC:   Recent Labs   Lab 07/29/20  0544   WBC 6.80   RBC 3.21*   HGB 9.9*   HCT 32.6*      *   MCH 30.8   MCHC 30.4*     CMP:   Recent Labs   Lab 07/27/20  0232  07/29/20  0544   *   < > 176*   CALCIUM 9.0   < > 9.1  "  ALBUMIN 3.7  --   --    PROT 8.1  --   --    *   < > 142   K 4.5   < > 4.7   CO2 20*   < > 23      < > 102   BUN 78*   < > 79*   CREATININE 11.7*   < > 11.4*   ALKPHOS 110  --   --    ALT 5*  --   --    AST 10  --   --    BILITOT 0.5  --   --     < > = values in this interval not displayed.     All labs within the past 24 hours have been reviewed.    Significant Imaging:  Imaging Results          X-Ray Chest AP Portable (Final result)  Result time 07/27/20 03:09:23    Final result by Maycol Rodriguez MD (07/27/20 03:09:23)                 Impression:      Mild cardiomegaly with possible mild edema and bibasilar subsegmental atelectasis.  No detrimental change when compared with 06/13/2020.      Electronically signed by: Maycol Rodriguez MD  Date:    07/27/2020  Time:    03:09             Narrative:    EXAMINATION:  XR CHEST AP PORTABLE    CLINICAL HISTORY:  Provided history is "Epigastric pain, hypoxia.;  ".    TECHNIQUE:  One view of the chest.    COMPARISON:  06/13/2020.    FINDINGS:  Cardiac wires overlie the chest.  Lung volumes are low.  Right hemidiaphragm is mildly elevated.  Cardiomediastinal silhouette is mildly enlarged but stable.  Atherosclerotic calcifications overlie the aortic arch.  Prominent perihilar interstitial lung markings and platelike subsegmental atelectasis in the lower lung zones.  No confluent area of consolidation.  No sizable pleural effusion.  No pneumothorax.  Right subclavian vascular stent again noted.                                  Assessment/Plan:     ESRD on peritoneal dialysis  59 yro F with ESRD on PD, HTN, DM2 and Recurrent AV fistula thrombosis/stenosis who presented 7/27/20 for abdominal pain that started Sunday AM at the end of her nightly Saturday - Sunday PD session. PD Rx was increased ~1 week ago from 1800 cc fills to 2200 cc fills. Nephrology is consulted for abdominal pain in PD patient and in-pt management of PD and ESRD.     Home Rx  Nightly " peritoneal dialysis  MD: Brandi Bower  HD Ctr: [facility]  Fill volume: 2200 cc  Number of exchanges: 5  Bags: Alternating 2.5% and 1.5% bags  Duration: 10 hours    -Last BM Saturday per patient. Currently on Miramax BID. Refused night time dose yesterday. Recommend increase bowel regimen; ongoing abdominal discomfort possibly 2/2 constipation   -Repeat peritoneal fluid sample does not show evidence of peritonitis  -Will plan on PD tonight with 2L fills, rest of Rx will remain as above  -Strict IOs  -Trend RFPs, renally dose mediations, avoid nephrotoxins  -Please see attending attestation to follow            Thank you for your consult. I will follow-up with patient. Please contact us if you have any additional questions.    Lani Schwarz MD  Nephrology  [facility]

## 2020-07-29 NOTE — SUBJECTIVE & OBJECTIVE
Interval History: No events overnight. She reports ongoing constipation. Nephrology wants 1 more PD session before discharge.    Review of Systems   Constitutional: Negative for chills and fever.   Respiratory: Negative for chest tightness and shortness of breath.    Cardiovascular: Negative for chest pain and leg swelling.   Gastrointestinal: Positive for abdominal distention and abdominal pain. Negative for nausea.   Neurological: Negative for dizziness and weakness.     Objective:     Vital Signs (Most Recent):  Temp: 97 °F (36.1 °C) (07/29/20 1214)  Pulse: 76 (07/29/20 1319)  Resp: 16 (07/29/20 1319)  BP: (!) 145/69 (07/29/20 1214)  SpO2: 96 % (07/29/20 1319) Vital Signs (24h Range):  Temp:  [96 °F (35.6 °C)-97.9 °F (36.6 °C)] 97 °F (36.1 °C)  Pulse:  [61-76] 76  Resp:  [10-20] 16  SpO2:  [93 %-100 %] 96 %  BP: ()/(47-69) 145/69     Weight: 86.2 kg (190 lb 0.6 oz)  Body mass index is 33.66 kg/m².    Intake/Output Summary (Last 24 hours) at 7/29/2020 1517  Last data filed at 7/28/2020 1700  Gross per 24 hour   Intake 300 ml   Output --   Net 300 ml      Physical Exam  Vitals signs and nursing note reviewed.   Constitutional:       Appearance: She is well-developed.      Interventions: Nasal cannula in place.   Neck:      Musculoskeletal: Normal range of motion and neck supple.   Cardiovascular:      Rate and Rhythm: Normal rate and regular rhythm.      Heart sounds: Normal heart sounds.   Pulmonary:      Effort: Pulmonary effort is normal. No respiratory distress.      Breath sounds: Examination of the right-lower field reveals decreased breath sounds. Examination of the left-lower field reveals decreased breath sounds. Decreased breath sounds present. No wheezing or rales.   Chest:      Chest wall: No tenderness (right and left lower ribs).   Abdominal:      General: Bowel sounds are normal. There is distension.      Palpations: Abdomen is soft.      Tenderness: There is no abdominal tenderness.    Musculoskeletal: Normal range of motion.         General: No tenderness.   Lymphadenopathy:      Cervical: No cervical adenopathy.   Skin:     General: Skin is warm and dry.   Neurological:      Mental Status: She is alert.   Psychiatric:         Behavior: Behavior normal.         Thought Content: Thought content normal.         Significant Labs:   BMP:   Recent Labs   Lab 07/29/20  0544   *      K 4.7      CO2 23   BUN 79*   CREATININE 11.4*   CALCIUM 9.1   MG 3.2*     CBC:   Recent Labs   Lab 07/28/20  0359 07/29/20  0544   WBC 8.56 6.80   HGB 9.6* 9.9*   HCT 30.4* 32.6*    236     All pertinent labs within the past 24 hours have been reviewed.    Significant Imaging: I have reviewed all pertinent imaging results/findings within the past 24 hours.

## 2020-07-29 NOTE — PLAN OF CARE
Pt AAO x 4. Pt NAD; respirations 16-18 even, and unlabored. Pt currently on 2 L NC tolerating oxygen without difficulty or complaints. Pt saturation is maintained at %. Pt VSS. Pt currently on Peritoneal dialysis and tolerting well no signs of distress or complaints. Pt resting well. Pt has not made a BM after drinking Magnesium citrate. Pt instructed to call staff for any assistance. Pt has purwick in place 300 ml of clear yellow urine within suction canister at the bedside. Pt informed of POC and verbalize understanding. Pt denies any pain. Pt given call light within reach, bEd in lowest position, bed wheels locked, SR up x2. Pt remains free of any falls or injury. All safety measures implemented. Pt instructed to call staff before getting out of bed. Pt verbalize understanding. Will continue to monitor.

## 2020-07-29 NOTE — PROGRESS NOTES
Aseptic technique used to disconnect patient from PD cycler. New cap applied. Line secured with tape. Clear effluent noted in drain bag. Patient c/o pain under ribs. Stated it was not a new pain and that she was admitted with this complaint.       Total uf = 398 ml

## 2020-07-29 NOTE — SUBJECTIVE & OBJECTIVE
Interval History: Pt seen and examined at bedside. Reports ongoing RUQ abdomen discomfort, not worsening, but not resolved after decreasing PD fill volume. Received PD overnight, patient denies any issues with dialysis.  Repeat peritoneal fluid studies do not show evidence of peritonitis. Pt reports last BM was Saturday. On miralax BID, however refused night time dose yesterday.    Objective:     Vital Signs (Most Recent):  Temp: 96 °F (35.6 °C) (07/29/20 0738)  Pulse: 63 (07/29/20 0738)  Resp: 14 (07/29/20 0738)  BP: 136/63 (07/29/20 0738)  SpO2: 97 % (07/29/20 0738)  O2 Device (Oxygen Therapy): nasal cannula (07/28/20 2004) Vital Signs (24h Range):  Temp:  [96 °F (35.6 °C)-97.9 °F (36.6 °C)] 96 °F (35.6 °C)  Pulse:  [61-77] 63  Resp:  [10-20] 14  SpO2:  [93 %-100 %] 97 %  BP: ()/(47-67) 136/63     Weight: 86.2 kg (190 lb 0.6 oz) (07/27/20 0930)  Body mass index is 33.66 kg/m².  Body surface area is 1.96 meters squared.    I/O last 3 completed shifts:  In: 700 [P.O.:700]  Out: 689 [Other:689]    Physical Exam  Constitutional:       General: She is not in acute distress.     Appearance: She is well-developed.   HENT:      Head: Normocephalic and atraumatic.      Nose: Nose normal.      Mouth/Throat:      Pharynx: No oropharyngeal exudate.   Eyes:      General: No scleral icterus.     Conjunctiva/sclera: Conjunctivae normal.      Pupils: Pupils are equal, round, and reactive to light.   Neck:      Musculoskeletal: Neck supple.      Thyroid: No thyromegaly.   Cardiovascular:      Rate and Rhythm: Normal rate and regular rhythm.      Heart sounds: Normal heart sounds. No murmur.   Pulmonary:      Effort: Pulmonary effort is normal. No respiratory distress.      Breath sounds: Normal breath sounds. No wheezing or rales.   Abdominal:      General: Bowel sounds are normal. There is no distension.      Palpations: Abdomen is soft.      Tenderness: There is no abdominal tenderness.      Comments: PD catheter in place  "  Musculoskeletal:         General: No tenderness or deformity.   Lymphadenopathy:      Cervical: No cervical adenopathy.   Skin:     General: Skin is warm and dry.   Neurological:      Mental Status: She is alert and oriented to person, place, and time.      Cranial Nerves: No cranial nerve deficit.         Significant Labs:  CBC:   Recent Labs   Lab 07/29/20  0544   WBC 6.80   RBC 3.21*   HGB 9.9*   HCT 32.6*      *   MCH 30.8   MCHC 30.4*     CMP:   Recent Labs   Lab 07/27/20  0232  07/29/20  0544   *   < > 176*   CALCIUM 9.0   < > 9.1   ALBUMIN 3.7  --   --    PROT 8.1  --   --    *   < > 142   K 4.5   < > 4.7   CO2 20*   < > 23      < > 102   BUN 78*   < > 79*   CREATININE 11.7*   < > 11.4*   ALKPHOS 110  --   --    ALT 5*  --   --    AST 10  --   --    BILITOT 0.5  --   --     < > = values in this interval not displayed.     All labs within the past 24 hours have been reviewed.    Significant Imaging:  Imaging Results          X-Ray Chest AP Portable (Final result)  Result time 07/27/20 03:09:23    Final result by Maycol Rodriguez MD (07/27/20 03:09:23)                 Impression:      Mild cardiomegaly with possible mild edema and bibasilar subsegmental atelectasis.  No detrimental change when compared with 06/13/2020.      Electronically signed by: Maycol Rodriguez MD  Date:    07/27/2020  Time:    03:09             Narrative:    EXAMINATION:  XR CHEST AP PORTABLE    CLINICAL HISTORY:  Provided history is "Epigastric pain, hypoxia.;  ".    TECHNIQUE:  One view of the chest.    COMPARISON:  06/13/2020.    FINDINGS:  Cardiac wires overlie the chest.  Lung volumes are low.  Right hemidiaphragm is mildly elevated.  Cardiomediastinal silhouette is mildly enlarged but stable.  Atherosclerotic calcifications overlie the aortic arch.  Prominent perihilar interstitial lung markings and platelike subsegmental atelectasis in the lower lung zones.  No confluent area of consolidation.  " No sizable pleural effusion.  No pneumothorax.  Right subclavian vascular stent again noted.

## 2020-07-30 VITALS
TEMPERATURE: 97 F | WEIGHT: 190.06 LBS | DIASTOLIC BLOOD PRESSURE: 80 MMHG | SYSTOLIC BLOOD PRESSURE: 121 MMHG | HEART RATE: 67 BPM | OXYGEN SATURATION: 94 % | RESPIRATION RATE: 18 BRPM | HEIGHT: 63 IN | BODY MASS INDEX: 33.68 KG/M2

## 2020-07-30 PROBLEM — R09.02 HYPOXEMIA: Status: ACTIVE | Noted: 2020-07-30

## 2020-07-30 LAB
ANION GAP SERPL CALC-SCNC: 16 MMOL/L (ref 8–16)
BASOPHILS # BLD AUTO: 0.03 K/UL (ref 0–0.2)
BASOPHILS NFR BLD: 0.4 % (ref 0–1.9)
BUN SERPL-MCNC: 73 MG/DL (ref 8–23)
CALCIUM SERPL-MCNC: 8 MG/DL (ref 8.7–10.5)
CHLORIDE SERPL-SCNC: 101 MMOL/L (ref 95–110)
CO2 SERPL-SCNC: 24 MMOL/L (ref 23–29)
CREAT SERPL-MCNC: 11.7 MG/DL (ref 0.5–1.4)
DIFFERENTIAL METHOD: ABNORMAL
EOSINOPHIL # BLD AUTO: 0.2 K/UL (ref 0–0.5)
EOSINOPHIL NFR BLD: 3 % (ref 0–8)
ERYTHROCYTE [DISTWIDTH] IN BLOOD BY AUTOMATED COUNT: 14.5 % (ref 11.5–14.5)
EST. GFR  (AFRICAN AMERICAN): 3.6 ML/MIN/1.73 M^2
EST. GFR  (NON AFRICAN AMERICAN): 3.1 ML/MIN/1.73 M^2
GLUCOSE SERPL-MCNC: 156 MG/DL (ref 70–110)
HCT VFR BLD AUTO: 29.1 % (ref 37–48.5)
HGB BLD-MCNC: 8.5 G/DL (ref 12–16)
IMM GRANULOCYTES # BLD AUTO: 0.05 K/UL (ref 0–0.04)
IMM GRANULOCYTES NFR BLD AUTO: 0.7 % (ref 0–0.5)
LYMPHOCYTES # BLD AUTO: 1.4 K/UL (ref 1–4.8)
LYMPHOCYTES NFR BLD: 20.1 % (ref 18–48)
MAGNESIUM SERPL-MCNC: 3.4 MG/DL (ref 1.6–2.6)
MCH RBC QN AUTO: 30.2 PG (ref 27–31)
MCHC RBC AUTO-ENTMCNC: 29.2 G/DL (ref 32–36)
MCV RBC AUTO: 104 FL (ref 82–98)
MONOCYTES # BLD AUTO: 0.6 K/UL (ref 0.3–1)
MONOCYTES NFR BLD: 9 % (ref 4–15)
NEUTROPHILS # BLD AUTO: 4.7 K/UL (ref 1.8–7.7)
NEUTROPHILS NFR BLD: 66.8 % (ref 38–73)
NRBC BLD-RTO: 0 /100 WBC
PHOSPHATE SERPL-MCNC: 6.6 MG/DL (ref 2.7–4.5)
PLATELET # BLD AUTO: 215 K/UL (ref 150–350)
PMV BLD AUTO: 10.6 FL (ref 9.2–12.9)
POCT GLUCOSE: 154 MG/DL (ref 70–110)
POCT GLUCOSE: 218 MG/DL (ref 70–110)
POTASSIUM SERPL-SCNC: 4.3 MMOL/L (ref 3.5–5.1)
RBC # BLD AUTO: 2.81 M/UL (ref 4–5.4)
SODIUM SERPL-SCNC: 141 MMOL/L (ref 136–145)
WBC # BLD AUTO: 7.08 K/UL (ref 3.9–12.7)

## 2020-07-30 PROCEDURE — 82962 GLUCOSE BLOOD TEST: CPT | Mod: HCNC

## 2020-07-30 PROCEDURE — 94761 N-INVAS EAR/PLS OXIMETRY MLT: CPT | Mod: HCNC

## 2020-07-30 PROCEDURE — 84100 ASSAY OF PHOSPHORUS: CPT | Mod: HCNC

## 2020-07-30 PROCEDURE — 25000003 PHARM REV CODE 250: Mod: HCNC | Performed by: PHYSICIAN ASSISTANT

## 2020-07-30 PROCEDURE — 11000001 HC ACUTE MED/SURG PRIVATE ROOM: Mod: HCNC

## 2020-07-30 PROCEDURE — 80048 BASIC METABOLIC PNL TOTAL CA: CPT | Mod: HCNC

## 2020-07-30 PROCEDURE — 99900035 HC TECH TIME PER 15 MIN (STAT): Mod: HCNC

## 2020-07-30 PROCEDURE — 99239 HOSP IP/OBS DSCHRG MGMT >30: CPT | Mod: HCNC,,, | Performed by: PHYSICIAN ASSISTANT

## 2020-07-30 PROCEDURE — 36415 COLL VENOUS BLD VENIPUNCTURE: CPT | Mod: HCNC

## 2020-07-30 PROCEDURE — 83735 ASSAY OF MAGNESIUM: CPT | Mod: HCNC

## 2020-07-30 PROCEDURE — 85025 COMPLETE CBC W/AUTO DIFF WBC: CPT | Mod: HCNC

## 2020-07-30 PROCEDURE — 27000221 HC OXYGEN, UP TO 24 HOURS: Mod: HCNC

## 2020-07-30 PROCEDURE — 99239 PR HOSPITAL DISCHARGE DAY,>30 MIN: ICD-10-PCS | Mod: HCNC,,, | Performed by: PHYSICIAN ASSISTANT

## 2020-07-30 RX ORDER — LACTULOSE 10 G/15ML
20 SOLUTION ORAL 3 TIMES DAILY PRN
Qty: 450 ML | Refills: 0 | Status: SHIPPED | OUTPATIENT
Start: 2020-07-30 | End: 2020-08-04

## 2020-07-30 RX ADMIN — OXYCODONE HYDROCHLORIDE 5 MG: 5 TABLET ORAL at 08:07

## 2020-07-30 RX ADMIN — POLYETHYLENE GLYCOL 3350 17 G: 17 POWDER, FOR SOLUTION ORAL at 08:07

## 2020-07-30 RX ADMIN — LIDOCAINE 1 PATCH: 50 PATCH TOPICAL at 12:07

## 2020-07-30 RX ADMIN — ASPIRIN 81 MG: 81 TABLET, COATED ORAL at 06:07

## 2020-07-30 RX ADMIN — LEVETIRACETAM 1000 MG: 500 TABLET ORAL at 08:07

## 2020-07-30 RX ADMIN — SEVELAMER CARBONATE 800 MG: 800 TABLET, FILM COATED ORAL at 12:07

## 2020-07-30 RX ADMIN — TORSEMIDE 20 MG: 20 TABLET ORAL at 08:07

## 2020-07-30 RX ADMIN — INSULIN ASPART 2 UNITS: 100 INJECTION, SOLUTION INTRAVENOUS; SUBCUTANEOUS at 12:07

## 2020-07-30 RX ADMIN — SEVELAMER CARBONATE 800 MG: 800 TABLET, FILM COATED ORAL at 08:07

## 2020-07-30 RX ADMIN — FLUOXETINE 20 MG: 20 CAPSULE ORAL at 08:07

## 2020-07-30 RX ADMIN — OXYCODONE HYDROCHLORIDE 5 MG: 5 TABLET ORAL at 01:07

## 2020-07-30 RX ADMIN — ATORVASTATIN CALCIUM 40 MG: 40 TABLET, FILM COATED ORAL at 08:07

## 2020-07-30 RX ADMIN — LOSARTAN POTASSIUM 50 MG: 50 TABLET ORAL at 08:07

## 2020-07-30 RX ADMIN — LACTULOSE 20 G: 20 SOLUTION ORAL at 08:07

## 2020-07-30 RX ADMIN — CLOPIDOGREL 75 MG: 75 TABLET, FILM COATED ORAL at 08:07

## 2020-07-30 NOTE — NURSING
Patient discharged to home. IV removed,discharge instructions given. Patient verbalized her understanding.  Patient has taken all personal belongings. Patient escorted to car in a wheelchair by  spouse.

## 2020-07-30 NOTE — PROGRESS NOTES
Aseptic technique used to disconnect patient from PD cycler. New capp applied, line secured with tape. Clear effluent noted in drain bag.      Total UF = 481 ml

## 2020-07-30 NOTE — ASSESSMENT & PLAN NOTE
59 yro F with ESRD on PD, HTN, DM2 and Recurrent AV fistula thrombosis/stenosis who presented 7/27/20 for abdominal pain that started Sunday AM at the end of her nightly Saturday - Sunday PD session. PD Rx was increased ~1 week ago from 1800 cc fills to 2200 cc fills. Nephrology is consulted for abdominal pain in PD patient and in-pt management of PD and ESRD.     Home Rx  Nightly peritoneal dialysis  MD: Brandi Bower  HD Ctr: [facility]  Fill volume: 2200 cc  Number of exchanges: 5  Bags: Alternating 2.5% and 1.5% bags  Duration: 10 hours    - Increased bowel regimen yesterday. On Miramax BID and lactulose TID with 2x BM yesterday and improvement in abdo discomfort. Constipation was likely contributing to abdominal pain  -Repeat peritoneal fluid sample does not show evidence of peritonitis  -Will plan on PD tonight with 2L fills, rest of Rx will remain as above  -Strict IOs  -Trend RFPs, renally dose mediations, avoid nephrotoxins  -Please see attending attestation to follow

## 2020-07-30 NOTE — PLAN OF CARE
Pt Aaox4.Pt NAD; respirations 18, even, and unlabored; maintaining a 95-98%  oxygen sat on 1 L NC. Pt tolerating well. Pt skin war, dry, and intact. Pt IV access flushes without difficulty. Pt currently on peritoneal dialysis and has maintained PD sin 2300 last night. Pt has no signs of distress or reactions. Pt admitted lower back pain 10/10. Pt given PRN pain medication. Pt has moderate pain relief from medication ordered. Pt rested well throughout the night. Pt had a BM x2. Last night. Pt cleansed and bed pads changed. Pt informed of POC and verbalize understanding. Pt expresses readiness to go home. Pt call light within reach and bed wheels locked, bed in lowest position, bed wheels locked, SR up x2. Will continue to monitor.

## 2020-07-30 NOTE — PROGRESS NOTES
Peritoneal dialysis    10-hr CCPD started. Access care done aseptically.    Initial drain: 30 ml

## 2020-07-30 NOTE — PROGRESS NOTES
[facility]  Nephrology  Progress Note    Patient Name: Lucy Perez  MRN: [id number]  Admission Date: 7/27/2020  Hospital Length of Stay: 0 days  Attending Provider: Jerry Weir MD   Primary Care Physician: Beverly Muniz MD  Principal Problem:Acute respiratory failure with hypoxia    Subjective:     HPI: 59 yro F with ESRD on PD, HTN, DM2, CVA with residual hemiparesis (left), Recurrent AV fistula thrombosis/stenosis, CAD, THERESA, and sarcoidosis who presented to the ED 7/27/20 for abdominal pain that started Sunday AM at the end of her nightly Saturday - Sunday PD session. The pain is located in the RUQ around the area of the R lower rib. This is associated with bloating, nausea, and SOB. She normally makes some urine, but denies any UO for past day. She normally has 1 BM daily on home miralax, but denies BM for the past 24 hours. She denies fevers, chills, dysuria, hematuria, or any recent issues with her PD catheter. She reports her PD Rx was increased about 1 week ago from 1800 cc fills to 2200 cc fills. She reports abdominal discomfort with the increase in fill volumes. In the ED, she was given 100 IV lasix with 1 unrecorded UO.     Home Rx  Nightly peritoneal dialysis  MD: Brandi Bower  HD Ctr: [facility]  Fill volume: 2200 cc  Number of exchanges: 5  Bags: Alternating 2.5% and 1.5% bags  Duration: 10 hours        Interval History: Pt seen and examined at bedside. Bowel regimen increased to include lactulose TID yesterday, pt reports 2 BM's overnight and improvement in abdominal discomfort. Received PD overnight, patient denies any issues with dialysis.      Objective:     Vital Signs (Most Recent):  Temp: 97.4 °F (36.3 °C) (07/30/20 0710)  Pulse: 71 (07/30/20 0710)  Resp: 18 (07/30/20 0820)  BP: 132/61 (07/30/20 0710)  SpO2: 97 % (07/30/20 0710)  O2 Device (Oxygen Therapy): nasal cannula (07/30/20 0024) Vital Signs (24h Range):  Temp:  [97 °F (36.1 °C)-98.2 °F (36.8 °C)]  97.4 °F (36.3 °C)  Pulse:  [62-78] 71  Resp:  [16-20] 18  SpO2:  [93 %-99 %] 97 %  BP: ()/(48-69) 132/61     Weight: 86.2 kg (190 lb 0.6 oz) (07/27/20 0930)  Body mass index is 33.66 kg/m².  Body surface area is 1.96 meters squared.    I/O last 3 completed shifts:  In: 460 [P.O.:460]  Out: 750 [Urine:750]    Physical Exam  Constitutional:       General: She is not in acute distress.     Appearance: She is well-developed.   HENT:      Head: Normocephalic and atraumatic.      Nose: Nose normal.      Mouth/Throat:      Pharynx: No oropharyngeal exudate.   Eyes:      General: No scleral icterus.     Conjunctiva/sclera: Conjunctivae normal.      Pupils: Pupils are equal, round, and reactive to light.   Neck:      Musculoskeletal: Neck supple.      Thyroid: No thyromegaly.   Cardiovascular:      Rate and Rhythm: Normal rate and regular rhythm.      Heart sounds: Normal heart sounds. No murmur.   Pulmonary:      Effort: Pulmonary effort is normal. No respiratory distress.      Breath sounds: Normal breath sounds. No wheezing or rales.   Abdominal:      General: Bowel sounds are normal. There is no distension.      Palpations: Abdomen is soft.      Tenderness: There is no abdominal tenderness.      Comments: PD catheter in place   Musculoskeletal:         General: No tenderness or deformity.   Lymphadenopathy:      Cervical: No cervical adenopathy.   Skin:     General: Skin is warm and dry.   Neurological:      Mental Status: She is alert and oriented to person, place, and time.      Cranial Nerves: No cranial nerve deficit.         Significant Labs:  CBC:   Recent Labs   Lab 07/30/20  0336   WBC 7.08   RBC 2.81*   HGB 8.5*   HCT 29.1*      *   MCH 30.2   MCHC 29.2*     CMP:   Recent Labs   Lab 07/27/20  0232  07/30/20  0336   *   < > 156*   CALCIUM 9.0   < > 8.0*   ALBUMIN 3.7  --   --    PROT 8.1  --   --    *   < > 141   K 4.5   < > 4.3   CO2 20*   < > 24      < > 101   BUN 78*   <  "> 73*   CREATININE 11.7*   < > 11.7*   ALKPHOS 110  --   --    ALT 5*  --   --    AST 10  --   --    BILITOT 0.5  --   --     < > = values in this interval not displayed.     All labs within the past 24 hours have been reviewed.    Significant Imaging:  Imaging Results          X-Ray Chest AP Portable (Final result)  Result time 07/27/20 03:09:23    Final result by Maycol Rodriguez MD (07/27/20 03:09:23)                 Impression:      Mild cardiomegaly with possible mild edema and bibasilar subsegmental atelectasis.  No detrimental change when compared with 06/13/2020.      Electronically signed by: Maycol Rodriguez MD  Date:    07/27/2020  Time:    03:09             Narrative:    EXAMINATION:  XR CHEST AP PORTABLE    CLINICAL HISTORY:  Provided history is "Epigastric pain, hypoxia.;  ".    TECHNIQUE:  One view of the chest.    COMPARISON:  06/13/2020.    FINDINGS:  Cardiac wires overlie the chest.  Lung volumes are low.  Right hemidiaphragm is mildly elevated.  Cardiomediastinal silhouette is mildly enlarged but stable.  Atherosclerotic calcifications overlie the aortic arch.  Prominent perihilar interstitial lung markings and platelike subsegmental atelectasis in the lower lung zones.  No confluent area of consolidation.  No sizable pleural effusion.  No pneumothorax.  Right subclavian vascular stent again noted.                                  Assessment/Plan:     ESRD on peritoneal dialysis  59 yro F with ESRD on PD, HTN, DM2 and Recurrent AV fistula thrombosis/stenosis who presented 7/27/20 for abdominal pain that started Sunday AM at the end of her nightly Saturday - Sunday PD session. PD Rx was increased ~1 week ago from 1800 cc fills to 2200 cc fills. Nephrology is consulted for abdominal pain in PD patient and in-pt management of PD and ESRD.     Home Rx  Nightly peritoneal dialysis  MD: Brandi Bower  HD Ctr: [facility]  Fill volume: 2200 cc  Number of exchanges: 5  Bags: Alternating 2.5% and 1.5% " bags  Duration: 10 hours    - Increased bowel regimen yesterday. On Miramax BID and lactulose TID with 2x BM yesterday and improvement in abdo discomfort. Constipation was likely contributing to abdominal pain  -Repeat peritoneal fluid sample does not show evidence of peritonitis  -Will plan on PD tonight with 2L fills, rest of Rx will remain as above  -Strict IOs  -Trend RFPs, renally dose mediations, avoid nephrotoxins  -Please see attending attestation to follow            Thank you for your consult. I will follow-up with patient. Please contact us if you have any additional questions.    Lani Schwarz MD  Nephrology  [facility]

## 2020-07-30 NOTE — DISCHARGE INSTRUCTIONS
Take miralax twice a day at home.  Can  lactulose prescription (not sure the price) to use as needed for constipation  Ideally, have the machine updated to 2000cc nightly fluid for PD

## 2020-07-30 NOTE — PLAN OF CARE
Patient AAOX4 vital signs stable. Safety and infection precautions maintained. Patient is comfortable at this time,bed is locked and in a low position with call light in reach. Will cont to monitor.

## 2020-07-30 NOTE — SUBJECTIVE & OBJECTIVE
Interval History: Pt seen and examined at bedside. Bowel regimen increased to include lactulose TID yesterday, pt reports 2 BM's overnight and improvement in abdominal discomfort. Received PD overnight, patient denies any issues with dialysis.      Objective:     Vital Signs (Most Recent):  Temp: 97.4 °F (36.3 °C) (07/30/20 0710)  Pulse: 71 (07/30/20 0710)  Resp: 18 (07/30/20 0820)  BP: 132/61 (07/30/20 0710)  SpO2: 97 % (07/30/20 0710)  O2 Device (Oxygen Therapy): nasal cannula (07/30/20 0024) Vital Signs (24h Range):  Temp:  [97 °F (36.1 °C)-98.2 °F (36.8 °C)] 97.4 °F (36.3 °C)  Pulse:  [62-78] 71  Resp:  [16-20] 18  SpO2:  [93 %-99 %] 97 %  BP: ()/(48-69) 132/61     Weight: 86.2 kg (190 lb 0.6 oz) (07/27/20 0930)  Body mass index is 33.66 kg/m².  Body surface area is 1.96 meters squared.    I/O last 3 completed shifts:  In: 460 [P.O.:460]  Out: 750 [Urine:750]    Physical Exam  Constitutional:       General: She is not in acute distress.     Appearance: She is well-developed.   HENT:      Head: Normocephalic and atraumatic.      Nose: Nose normal.      Mouth/Throat:      Pharynx: No oropharyngeal exudate.   Eyes:      General: No scleral icterus.     Conjunctiva/sclera: Conjunctivae normal.      Pupils: Pupils are equal, round, and reactive to light.   Neck:      Musculoskeletal: Neck supple.      Thyroid: No thyromegaly.   Cardiovascular:      Rate and Rhythm: Normal rate and regular rhythm.      Heart sounds: Normal heart sounds. No murmur.   Pulmonary:      Effort: Pulmonary effort is normal. No respiratory distress.      Breath sounds: Normal breath sounds. No wheezing or rales.   Abdominal:      General: Bowel sounds are normal. There is no distension.      Palpations: Abdomen is soft.      Tenderness: There is no abdominal tenderness.      Comments: PD catheter in place   Musculoskeletal:         General: No tenderness or deformity.   Lymphadenopathy:      Cervical: No cervical adenopathy.   Skin:      "General: Skin is warm and dry.   Neurological:      Mental Status: She is alert and oriented to person, place, and time.      Cranial Nerves: No cranial nerve deficit.         Significant Labs:  CBC:   Recent Labs   Lab 07/30/20  0336   WBC 7.08   RBC 2.81*   HGB 8.5*   HCT 29.1*      *   MCH 30.2   MCHC 29.2*     CMP:   Recent Labs   Lab 07/27/20  0232  07/30/20  0336   *   < > 156*   CALCIUM 9.0   < > 8.0*   ALBUMIN 3.7  --   --    PROT 8.1  --   --    *   < > 141   K 4.5   < > 4.3   CO2 20*   < > 24      < > 101   BUN 78*   < > 73*   CREATININE 11.7*   < > 11.7*   ALKPHOS 110  --   --    ALT 5*  --   --    AST 10  --   --    BILITOT 0.5  --   --     < > = values in this interval not displayed.     All labs within the past 24 hours have been reviewed.    Significant Imaging:  Imaging Results          X-Ray Chest AP Portable (Final result)  Result time 07/27/20 03:09:23    Final result by Maycol Rodriguez MD (07/27/20 03:09:23)                 Impression:      Mild cardiomegaly with possible mild edema and bibasilar subsegmental atelectasis.  No detrimental change when compared with 06/13/2020.      Electronically signed by: Maycol Rodriguez MD  Date:    07/27/2020  Time:    03:09             Narrative:    EXAMINATION:  XR CHEST AP PORTABLE    CLINICAL HISTORY:  Provided history is "Epigastric pain, hypoxia.;  ".    TECHNIQUE:  One view of the chest.    COMPARISON:  06/13/2020.    FINDINGS:  Cardiac wires overlie the chest.  Lung volumes are low.  Right hemidiaphragm is mildly elevated.  Cardiomediastinal silhouette is mildly enlarged but stable.  Atherosclerotic calcifications overlie the aortic arch.  Prominent perihilar interstitial lung markings and platelike subsegmental atelectasis in the lower lung zones.  No confluent area of consolidation.  No sizable pleural effusion.  No pneumothorax.  Right subclavian vascular stent again noted.                                "

## 2020-07-30 NOTE — ASSESSMENT & PLAN NOTE
ESRD  Chest pain  Chronic diastolic HF    Patient presents with shortness of breath and hypoxia due to fluid overload. She is on oxygen at home PRN but was requiring 3L after saturating 89% on room air.  - CXR with pulmonary edema  - troponins 0.049->0.046  - EKG without ischemic changes  Nephrology consulted  - PD successful x 2, will repeat tonight  - suspect due to change in PD regimen  - strict Is/Os, daily weights  - daily renal labs  - admit weight 190lbs, repeat pending post PD  - RESOLVED

## 2020-07-31 ENCOUNTER — PATIENT OUTREACH (OUTPATIENT)
Dept: ADMINISTRATIVE | Facility: CLINIC | Age: 60
End: 2020-07-31

## 2020-07-31 NOTE — PATIENT INSTRUCTIONS

## 2020-08-01 LAB
BACTERIA FLD CULT: ABNORMAL
BACTERIA FLD CULT: ABNORMAL

## 2020-08-02 LAB — BACTERIA FLD CULT: NORMAL

## 2020-08-03 ENCOUNTER — HOSPITAL ENCOUNTER (EMERGENCY)
Facility: HOSPITAL | Age: 60
Discharge: HOME | End: 2020-08-04
Attending: EMERGENCY MEDICINE
Payer: MEDICARE

## 2020-08-03 DIAGNOSIS — R06.02 SOB (SHORTNESS OF BREATH): Primary | ICD-10-CM

## 2020-08-03 DIAGNOSIS — E87.70 FLUID OVERLOAD: ICD-10-CM

## 2020-08-03 DIAGNOSIS — I50.9 HEART FAILURE: ICD-10-CM

## 2020-08-03 LAB — SARS-COV-2 RDRP RESP QL NAA+PROBE: NEGATIVE

## 2020-08-03 PROCEDURE — 96374 THER/PROPH/DIAG INJ IV PUSH: CPT | Mod: HCNC

## 2020-08-03 PROCEDURE — 99285 EMERGENCY DEPT VISIT HI MDM: CPT | Mod: ,,, | Performed by: EMERGENCY MEDICINE

## 2020-08-03 PROCEDURE — 93005 ELECTROCARDIOGRAM TRACING: CPT | Mod: HCNC

## 2020-08-03 PROCEDURE — 93010 ELECTROCARDIOGRAM REPORT: CPT | Mod: HCNC,,, | Performed by: INTERNAL MEDICINE

## 2020-08-03 PROCEDURE — 93010 EKG 12-LEAD: ICD-10-PCS | Mod: HCNC,,, | Performed by: INTERNAL MEDICINE

## 2020-08-03 PROCEDURE — U0002 COVID-19 LAB TEST NON-CDC: HCPCS | Mod: HCNC

## 2020-08-03 PROCEDURE — 99285 EMERGENCY DEPT VISIT HI MDM: CPT | Mod: 25,HCNC

## 2020-08-03 PROCEDURE — 99285 PR EMERGENCY DEPT VISIT,LEVEL V: ICD-10-PCS | Mod: ,,, | Performed by: EMERGENCY MEDICINE

## 2020-08-04 VITALS
HEART RATE: 73 BPM | BODY MASS INDEX: 30.48 KG/M2 | SYSTOLIC BLOOD PRESSURE: 163 MMHG | OXYGEN SATURATION: 99 % | TEMPERATURE: 98 F | DIASTOLIC BLOOD PRESSURE: 72 MMHG | RESPIRATION RATE: 13 BRPM | HEIGHT: 64 IN | WEIGHT: 178.56 LBS

## 2020-08-04 LAB
ALBUMIN SERPL BCP-MCNC: 3 G/DL (ref 3.5–5.2)
ALP SERPL-CCNC: 92 U/L (ref 55–135)
ALT SERPL W/O P-5'-P-CCNC: 6 U/L (ref 10–44)
ANION GAP SERPL CALC-SCNC: 19 MMOL/L (ref 8–16)
AST SERPL-CCNC: 9 U/L (ref 10–40)
BASOPHILS # BLD AUTO: 0.05 K/UL (ref 0–0.2)
BASOPHILS NFR BLD: 0.5 % (ref 0–1.9)
BILIRUB SERPL-MCNC: 0.3 MG/DL (ref 0.1–1)
BNP SERPL-MCNC: 34 PG/ML (ref 0–99)
BUN SERPL-MCNC: 77 MG/DL (ref 8–23)
CALCIUM SERPL-MCNC: 8.1 MG/DL (ref 8.7–10.5)
CHLORIDE SERPL-SCNC: 101 MMOL/L (ref 95–110)
CO2 SERPL-SCNC: 20 MMOL/L (ref 23–29)
CREAT SERPL-MCNC: 12 MG/DL (ref 0.5–1.4)
D DIMER PPP IA.FEU-MCNC: 0.72 MG/L FEU
DIFFERENTIAL METHOD: ABNORMAL
EOSINOPHIL # BLD AUTO: 0.1 K/UL (ref 0–0.5)
EOSINOPHIL NFR BLD: 1 % (ref 0–8)
ERYTHROCYTE [DISTWIDTH] IN BLOOD BY AUTOMATED COUNT: 15.1 % (ref 11.5–14.5)
EST. GFR  (AFRICAN AMERICAN): 3.5 ML/MIN/1.73 M^2
EST. GFR  (NON AFRICAN AMERICAN): 3 ML/MIN/1.73 M^2
GLUCOSE SERPL-MCNC: 165 MG/DL (ref 70–110)
HCT VFR BLD AUTO: 31.6 % (ref 37–48.5)
HGB BLD-MCNC: 9.6 G/DL (ref 12–16)
IMM GRANULOCYTES # BLD AUTO: 0.04 K/UL (ref 0–0.04)
IMM GRANULOCYTES NFR BLD AUTO: 0.4 % (ref 0–0.5)
LYMPHOCYTES # BLD AUTO: 0.9 K/UL (ref 1–4.8)
LYMPHOCYTES NFR BLD: 9 % (ref 18–48)
MCH RBC QN AUTO: 30.8 PG (ref 27–31)
MCHC RBC AUTO-ENTMCNC: 30.4 G/DL (ref 32–36)
MCV RBC AUTO: 101 FL (ref 82–98)
MONOCYTES # BLD AUTO: 0.6 K/UL (ref 0.3–1)
MONOCYTES NFR BLD: 6.1 % (ref 4–15)
NEUTROPHILS # BLD AUTO: 8.1 K/UL (ref 1.8–7.7)
NEUTROPHILS NFR BLD: 83 % (ref 38–73)
NRBC BLD-RTO: 0 /100 WBC
PLATELET # BLD AUTO: 254 K/UL (ref 150–350)
PMV BLD AUTO: 11.5 FL (ref 9.2–12.9)
POTASSIUM SERPL-SCNC: 4.8 MMOL/L (ref 3.5–5.1)
PROT SERPL-MCNC: 6.9 G/DL (ref 6–8.4)
RBC # BLD AUTO: 3.12 M/UL (ref 4–5.4)
SODIUM SERPL-SCNC: 140 MMOL/L (ref 136–145)
TROPONIN I SERPL DL<=0.01 NG/ML-MCNC: 0.04 NG/ML (ref 0–0.03)
WBC # BLD AUTO: 9.76 K/UL (ref 3.9–12.7)

## 2020-08-04 PROCEDURE — 85025 COMPLETE CBC W/AUTO DIFF WBC: CPT | Mod: HCNC

## 2020-08-04 PROCEDURE — 85379 FIBRIN DEGRADATION QUANT: CPT | Mod: HCNC

## 2020-08-04 PROCEDURE — 83880 ASSAY OF NATRIURETIC PEPTIDE: CPT | Mod: HCNC

## 2020-08-04 PROCEDURE — 63600175 PHARM REV CODE 636 W HCPCS: Mod: HCNC | Performed by: STUDENT IN AN ORGANIZED HEALTH CARE EDUCATION/TRAINING PROGRAM

## 2020-08-04 PROCEDURE — 84484 ASSAY OF TROPONIN QUANT: CPT | Mod: HCNC

## 2020-08-04 PROCEDURE — 25500020 PHARM REV CODE 255: Mod: HCNC | Performed by: EMERGENCY MEDICINE

## 2020-08-04 PROCEDURE — 80053 COMPREHEN METABOLIC PANEL: CPT | Mod: HCNC

## 2020-08-04 RX ORDER — FUROSEMIDE 10 MG/ML
40 INJECTION INTRAMUSCULAR; INTRAVENOUS
Status: COMPLETED | OUTPATIENT
Start: 2020-08-04 | End: 2020-08-04

## 2020-08-04 RX ADMIN — FUROSEMIDE 40 MG: 10 INJECTION, SOLUTION INTRAMUSCULAR; INTRAVENOUS at 02:08

## 2020-08-04 RX ADMIN — IOHEXOL 75 ML: 350 INJECTION, SOLUTION INTRAVENOUS at 03:08

## 2020-08-04 NOTE — ED PROVIDER NOTES
Encounter Date: 8/3/2020     History     Chief Complaint   Patient presents with    Shortness of Breath     Nightly PD, reports SOB, denies fever, cough, chills.      Ms. Lucy Perez is a 59 y.o. F with a PMHx of ESRD on PD with oliguria, HTN, T2DM, CVA in 2016 with left sided hemiparersis, THERESA, diastolic HF presenting with a 2 day history of progressively worsening shortness of breath. Of note, patient was recently admitted from 7/28-7/30 for shortness of breath secondary to fluid overload from ESRD. Patient states that she had increasing oxygen requirements at home starting at 2L, now requiring 2.5-3L. Her shortness of breath is worse with exertion and gets slightly better with rest. She is only able to move minimally due to her SOB. She also gets shortness of breath when she lies flat and has to sleep sitting up. She denies any cough, chest pain, or swelling.         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    Cortical cataract of both eyes 7/26/2016    Decubitus ulcer of buttock, stage 2     Degeneration of lumbar or lumbosacral intervertebral disc 3/5/2013    S/p MRI L-spine 5/2009     Depression     Encounter for blood transfusion     ESRD on hemodialysis     Started August 2018    General anesthetics causing adverse effect in therapeutic use     Hemorrhagic cerebrovascular accident (CVA)     [place] [facility] [place]    History of stroke  6/28/2017    s/p R-MCA stroke with R-putaminal hemorrhagic transformation in 8/2016 and 11/2016 ([place] [facility] [place] with residual L hemiparesis, on AED s/p CVA      Hypertensive retinopathy of both eyes 7/26/2016    Impingement syndrome of right shoulder 7/2/2014    Obesity     THERESA (obstructive sleep apnea) 3/5/2013    No Home CPAP 2ndary to cost     Partial symptomatic epilepsy with complex partial seizures, not intractable, without status epilepticus     Rheumatoid arthritis(714.0)     Rotator cuff tear 7/2/2014    Sarcoidosis     Stroke 2016    left sided flaccidity, SAH    Vertebral artery stenosis 3/5/2013    S/p Stenting per Dr Burnett      Past Surgical History:   Procedure Laterality Date    BREAST SURGERY      breast reduction    COLONOSCOPY N/A 8/11/2016    Procedure: COLONOSCOPY;  Surgeon: Jerry Vilchis MD;  Location: [facility] ENDO (4TH FLR);  Service: Endoscopy;  Laterality: N/A;  Patient reports difficulty awaking from anesthesia in the past.    DECLOTTING OF VASCULAR GRAFT Right 6/20/2019    Procedure: DECLOT-GRAFT;  Surgeon: Cabrera Irwin MD;  Location: [facility] CATH LAB;  Service: Cardiology;  Laterality: Right;    DECLOTTING OF VASCULAR GRAFT Right 12/6/2019    Procedure: DECLOT-GRAFT;  Surgeon: Cabrera Irwin MD;  Location: [facility] OR 2ND FLR;  Service: Peripheral Vascular;  Laterality: Right;    DECLOTTING OF VASCULAR GRAFT N/A 6/10/2020    Procedure: Declotting, Vascular Graft;  Surgeon: SERENA Buchanan II, MD;  Location: [facility] CATH LAB;  Service: Vascular;  Laterality: N/A;    DIAGNOSTIC LAPAROSCOPY N/A 6/8/2020    Procedure: LAPAROSCOPY, DIAGNOSTIC (POSSIBLE PD CATH REVISION);  Surgeon: Paige Monsalve MD;  Location: [facility] OR 2ND FLR;  Service: General;  Laterality: N/A;    FISTULOGRAM Right 2/11/2019    Procedure: Fistulogram;  Surgeon: Meir Valencia MD;  Location: [facility] CATH LAB;  Service: Peripheral Vascular;  Laterality: Right;    FISTULOGRAM Right  "7/8/2019    Procedure: Fistulogram;  Surgeon: WELLINGTON Palm III, MD;  Location: [facility] CATH LAB;  Service: Peripheral Vascular;  Laterality: Right;    FISTULOGRAM Right 12/6/2019    Procedure: Fistulogram;  Surgeon: Cabrera Irwin MD;  Location: [facility] OR [facility] FLR;  Service: Peripheral Vascular;  Laterality: Right;    FISTULOGRAM Right 3/12/2020    Procedure: FISTULOGRAM;  Surgeon: Meir Valencia MD;  Location: [facility] CATH LAB;  Service: Peripheral Vascular;  Laterality: Right;    HYSTERECTOMY  1999    JOSE LUIS/BSO (AUB)    LAPAROSCOPIC LYSIS OF ADHESIONS N/A 3/9/2020    Procedure: LYSIS, ADHESIONS, LAPAROSCOPIC;  Surgeon: Paige Monsalve MD;  [facility];  Service: General;  Laterality: N/A;    LAPAROSCOPIC REVISION OF PROCEDURE INVOLVING PERITONEAL DIALYSIS CATHETER N/A 6/8/2020    Procedure: REVISION OF PROCEDURE INVOLVING PERITONEAL DIALYSIS CATHETER, LAPAROSCOPIC;  Surgeon: Paige Monsalve MD;  [facility];  Service: General;  Laterality: N/A;    PLACEMENT OF ARTERIOVENOUS GRAFT Right 10/18/2018    Procedure: AV GRAFT CREATION;  Surgeon: Meir Valencia MD;  Location: [facility] OR [address] [place] [address];  Service: Cardiovascular;  Laterality: Right;    ROTATOR CUFF REPAIR Right July 9, 2014    right side    Skull surgery      Aneurysm    stent placed      in vertebral artery    TOTAL REDUCTION MAMMOPLASTY      TUBAL LIGATION       Family History   Problem Relation Age of Onset    Diabetes Mother     Hypertension Mother     Heart disease Mother     Heart attack Mother     Breast cancer Mother     Stroke Sister     Hypertension Sister     Sleep apnea Sister     No Known Problems Daughter     Diabetes Son     Bell's palsy Sister     Lupus Sister     Blindness Maternal Grandmother     Diabetes Unknown         "My entire family family has diabetes"    Stroke Maternal Aunt     Colon cancer Neg Hx     Ovarian cancer Neg Hx      Social History     Tobacco Use    " Smoking status: Never Smoker    Smokeless tobacco: Never Used   Substance Use Topics    Alcohol use: Yes     Frequency: Monthly or less     Drinks per session: 1 or 2     Binge frequency: Never     Comment: occasional wine cooler     Drug use: Never     Review of Systems   Constitutional: Negative for activity change, chills, fatigue and fever.   HENT: Negative for congestion, facial swelling, sore throat and trouble swallowing.    Eyes: Negative for photophobia and visual disturbance.   Respiratory: Positive for shortness of breath and wheezing. Negative for cough and chest tightness.    Cardiovascular: Negative for chest pain and palpitations.   Gastrointestinal: Negative for abdominal distention, abdominal pain, constipation, diarrhea, nausea and vomiting.   Genitourinary: Positive for decreased urine volume. Negative for dysuria and urgency.   Musculoskeletal: Negative for arthralgias and back pain.   Skin: Negative for color change and pallor.   Neurological: Positive for weakness. Negative for numbness and headaches.       Physical Exam     Initial Vitals [08/03/20 2101]   BP Pulse Resp Temp SpO2   121/72 82 16 97.9 °F (36.6 °C) (!) 91 %      MAP       --         Physical Exam    Constitutional: She appears well-developed and well-nourished. She is not diaphoretic. No distress.   HENT:   Head: Normocephalic and atraumatic.   Eyes: Conjunctivae are normal.   Neck: Normal range of motion. Neck supple. No JVD present.   Cardiovascular: Normal rate and regular rhythm. Exam reveals no gallop and no friction rub.    No murmur heard.  S4 heart sound   Pulmonary/Chest: No respiratory distress. She has no wheezes.   Bilateral crackles lower lung bases   Abdominal: Soft. She exhibits no distension. There is no guarding.   PD catheter site clean, non-erythematous   Musculoskeletal: No tenderness or edema.   Neurological: She is alert and oriented to person, place, and time.   Mild aphasia from CVA, hemiparesis of left  upper and lower extremity, 5+ strength on right upper and lower extremity   Skin: Skin is warm and dry. Capillary refill takes 2 to 3 seconds. No erythema.   Psychiatric: She has a normal mood and affect.         ED Course   Procedures  Labs Reviewed   CBC W/ AUTO DIFFERENTIAL - Abnormal; Notable for the following components:       Result Value    RBC 3.12 (*)     Hemoglobin 9.6 (*)     Hematocrit 31.6 (*)     Mean Corpuscular Volume 101 (*)     Mean Corpuscular Hemoglobin Conc 30.4 (*)     RDW 15.1 (*)     Gran # (ANC) 8.1 (*)     Lymph # 0.9 (*)     Gran% 83.0 (*)     Lymph% 9.0 (*)     All other components within normal limits   COMPREHENSIVE METABOLIC PANEL - Abnormal; Notable for the following components:    CO2 20 (*)     Glucose 165 (*)     BUN, Bld 77 (*)     Creatinine 12.0 (*)     Calcium 8.1 (*)     Albumin 3.0 (*)     AST 9 (*)     ALT 6 (*)     Anion Gap 19 (*)     eGFR if  3.5 (*)     eGFR if non  3.0 (*)     All other components within normal limits   D DIMER, QUANTITATIVE - Abnormal; Notable for the following components:    D-Dimer 0.72 (*)     All other components within normal limits    Narrative:     add on test d dimer per dr gwen azul order# [id number]    08/04/2020  02:43    TROPONIN I - Abnormal; Notable for the following components:    Troponin I 0.044 (*)     All other components within normal limits    Narrative:     ADD-ON ORDER TROP #[id number]; PER AROUL GALLEGO MD  08/04/2020    06:28    B-TYPE NATRIURETIC PEPTIDE   SARS-COV-2 RNA AMPLIFICATION, QUAL   D DIMER, QUANTITATIVE        ECG Results          EKG 12-lead (Final result)  Result time 08/05/20 09:25:43    Final result by Interface, Lab In [facility] (08/05/20 09:25:43)                 Narrative:    Test Reason : I50.9,    Vent. Rate : 077 BPM     Atrial Rate : 077 BPM     P-R Int : 194 ms          QRS Dur : 080 ms      QT Int : 428 ms       P-R-T Axes : 067 009 042 degrees     QTc Int : 484  ms    Normal sinus rhythm  Normal ECG  When compared with ECG of 28-JUL-2020 07:58,  No significant change was found  Confirmed by NIKOLAS SANDERSON MD ([id number]) on 8/5/2020 9:25:34 AM    Referred By: [email or web address]   SELF           Confirmed By:NIKOLAS SANDERSON MD                            Imaging Results          CTA Chest Non-Coronary (PE Study) (Final result)  Result time 08/04/20 04:24:33    Final result by Misael Garcia MD (08/04/20 04:24:33)                 Impression:      No evidence of pulmonary thromboembolism.    Cardiomegaly and dense multi-vessel coronary artery atherosclerosis.    Scattered bands of atelectasis throughout both lungs.    Electronically signed by resident: Brayan Salguero  Date:    08/04/2020  Time:    04:03    Electronically signed by: Misael Garcia MD  Date:    08/04/2020  Time:    04:24             Narrative:    EXAMINATION:  CTA CHEST NON CORONARY    CLINICAL HISTORY:  PE suspected, intermediate prob, positive D-dimer;PE suspected, high pretest prob;    TECHNIQUE:  During intravenous bolus injection of 75 ml of Omnipaque 350 contrast medium and using low dose technique, the chest was surveyed from above the pulmonary apices through the posterior costophrenic angles.  Data was reconstructed for multiplanar images in axial, sagittal and coronal planes and for maximal intensity projection images in the axial plane.    X-ray beam attenuation and scatter occur in generous overlying soft tissues.    COMPARISON:  Chest radiograph 08/03/2020; CT chest without contrast 04/29/2016    FINDINGS:  Base of Neck: No significant abnormality.    Vascular: Left-sided aortic arch with 2 branch vessels noting common origin of the brachiocephalic and common carotid arteries, normal anatomical variant.  The aorta maintains normal caliber, contour and course. There is moderate calcific atherosclerosis of the thoracic aorta.  There is  dense multi-vessel coronary artery calcification.  There is a stent within the  right subclavian vein.  Dense calcific atherosclerosis noted throughout the visualized portions of the celiac axis.    Heart/pericardium: Enlarged in size.  No pericardial effusion or calcification.    Pulmonary arteries: Pulmonary arteries distribute normally.  Pulmonary arteries are sufficiently opacified for diagnostic assessment.  There is no pulmonary thromboembolism or other filling defect to the level of the subsegmental arteries.  There is no pulmonary infarct.    Pulmonary veins, left atrium: There are four pulmonary veins that return to the left atrium.    Taryn/Mediastinum: No pathologic griselda enlargement.    Pleura: No pleural fluid.No pleural calcification.    Esophagus: Small hiatal hernia.    Upper Abdomen: No significant abnormality of the partially imaged upper abdomen.    Thoracic soft tissues: Normal.    Bones: No acute fracture. No suspicious lytic or sclerotic lesion.  Degenerative changes noted throughout the spine.    Airways: Patent.    Lungs: Scattered bands of atelectasis are present throughout both lungs.  No pleural fluid.                               X-Ray Chest 1 View (Final result)  Result time 08/03/20 22:21:52    Final result by Misael Garcia MD (08/03/20 22:21:52)                 Impression:      No significant change from prior study.      Electronically signed by: Misael Garcia MD  Date:    08/03/2020  Time:    22:21             Narrative:    EXAMINATION:  XR CHEST 1 VIEW    CLINICAL HISTORY:  Fluid overload, unspecified    TECHNIQUE:  Single frontal view of the chest was performed.    COMPARISON:  July 27, 2020.    FINDINGS:  Cardiomegaly with bilateral edema and subsegmental atelectasis right base.    Vascular stents in the right subclavian region, unchanged.    Heart and lungs  appear unchanged when allowing for differences in technique and positioning.                                 Medical Decision Making:   History:   Old Records Summarized: records from previous  admission(s).       <> Summary of Records: Patient was previously admitted for fluid overload secondary to ESRD and HF, got dialysis and discharged.   Initial Assessment:   Ms. Lucy Perez is a 59 y.o. F with a PMHx of ESRD on PD with oliguria, HTN, T2DM, CVA in 2016 with left sided hemiparersis, THERESA, diastolic HF presenting with a 2 day history of progressively worsening shortness of breath. Patient was satting of 95% on RA and was not in distress. No labored breathing or accessory muscle use. Patient has worsening short of breath when with movement but is comfortable when sitting still.   Differential Diagnosis:   DDx includes:  1. Fluid overload 2/2 ESRD  2. CHF exacerbation  3. Pneumonia  4. THERESA    Given patient's ESRD and mild diastolic dysfunction, fluid overload highly likely. CXR findings shows bilateral pulmonary edema mostly unchanged since previous admission. Pneumonia or infectious causes less likely given lack of symptoms.   Independently Interpreted Test(s):   I have ordered and independently interpreted X-rays - see summary below.       <> Summary of X-Ray Reading(s): Bilateral pulmonary edema unchanged from previous admisison.   Clinical Tests:   Lab Tests: Ordered  Radiological Study: Ordered  Medical Tests: Ordered  ED Management:  11:50 PM  Unable to obtain IV access, ultrasound guided attempted but failed. EJ considered but no good vein visible. Patient refused right sided EJ due to arterial stent. Senior nurse called to help attempt IV access.               Attending Attestation:   Physician Attestation Statement for Resident:  As the supervising MD   Physician Attestation Statement: I have personally seen and examined this patient.   I agree with the above history. -:   As the supervising MD I agree with the above PE.   -: O2 sat 97% on 3L and 95% on RA, bibasilar rales, no cough or tachypnea, no accessory muscle use   As the supervising MD I agree with the above treatment, course, plan,  and disposition.   -: Likely volume overload vs infectious process, labs pending. Pt oliguric, will give Lasix dose. S/o oncoming attending with labs pending, however if patient continues to express dyspnea likely obs for PD.   I have reviewed and agree with the residents interpretation of the following: lab data.  I have reviewed the following: old records at this facility.                    ED Course as of Aug 05 2139   Mon Aug 03, 2020   5259 I have assumed care of the patient. Patient ESRD patient on PD. Presenting with worsening shortness of breath over the past two days with signs of volume overload on exam and on chest Xray. Have not been able to obtain labs for evaluation of emergent needs for dialysis but anticipating labs with admission to medicine.     [JV]   2358 EKG: with normal sinus rhtym, without peaked T waves or DARYL/STD concerning for ischemia.     [JV]   Tue Aug 04, 2020   0139 Hemoglobin(!): 9.6 [JV]   0140 Hematocrit(!): 31.6 [JV]   0140 SARS-CoV-2 RNA, Amplification, Qual: Negative [JV]   0203 baseline   CBC auto differential(!) [JR]   0204 Baseline     Comprehensive metabolic panel(!) [JR]   0257 Added ddimer and diuresis. Patient with pleuritic chest pain and dyspnea. Concerned for PE, although not tachycardic and patient low risk but can't PERC out.     [JV]   0319 D-Dimer(!): 0.72 [JV]   0333 Elevated ddimer. Troponin added, on EKG no DARYL/STD concerning for ischemia. CT PE study ordered. In setting of dialysis will consult for admission pending CT chest study.       [JV]   0550 Spoke with nephrology in regards to appropriate dialysis for the patient after contrast. Felt that she was appropriate for PD at home, and to follow up with her dialysis center upon discharge this morning. Discussed the plan with her  as well as the patient and they are comfortable with the plan.     [JV]      ED Course User Index  [JR] Eliza Gray MD  [JV] Ponce Andres MD        Clinical Impression:        ICD-10-CM ICD-9-CM   1. SOB (shortness of breath)  R06.02 786.05   2. Heart failure  I50.9 428.9   3. Fluid overload  E87.70 276.69             ED Disposition Condition    Discharge Stable        ED Prescriptions     None        Follow-up Information     Follow up With Specialties Details Why Contact Info    Beverly Muniz MD Internal Medicine Schedule an appointment as soon as possible for a visit  for follow up to Ed discharge. [address]  [place] [address]  [phone]      [facility] Emergency Medicine Go to  If symptoms worsen [address] [facility] [id number] [phone]    nephrology  Call  to discuss contrasted study today for dialysis.                                      Ponce Andres MD  Resident  08/04/20 0836       Eliza Gray MD  08/05/20 2140       Eliza Gray MD  08/05/20 2140

## 2020-08-05 ENCOUNTER — TELEPHONE (OUTPATIENT)
Dept: INTERNAL MEDICINE | Facility: CLINIC | Age: 60
End: 2020-08-05

## 2020-08-05 ENCOUNTER — PATIENT MESSAGE (OUTPATIENT)
Dept: INTERNAL MEDICINE | Facility: CLINIC | Age: 60
End: 2020-08-05

## 2020-08-05 NOTE — TELEPHONE ENCOUNTER
Spoke with Ms Ayala and Reina ruiz ER visits and fluid overload being probable cause of the SOB.  Dr Elizabeth has adjusted fluid load/diuretic does for home peritoneal dialysis, so will follow along to ensure improvement.

## 2020-08-05 NOTE — TELEPHONE ENCOUNTER
DR Thomas  Pt had a Sleep study with CPAP trial in 1/2020 at [facility]. I have been unable to find the results and pt has never received the CPAP equipment at home.  I'm hoping you can shed some light on this or assist with the THERESA/CPAP. I do believe she may have had an appt during COVID quarantine but am not sure.  Her dialysis and hemiparesis from prior CVA make transportation difficult.  Thanks for any help, Beverly Muniz

## 2020-08-07 ENCOUNTER — TELEPHONE (OUTPATIENT)
Dept: INTERNAL MEDICINE | Facility: CLINIC | Age: 60
End: 2020-08-07

## 2020-08-07 DIAGNOSIS — G47.33 OSA (OBSTRUCTIVE SLEEP APNEA): Primary | ICD-10-CM

## 2020-08-07 NOTE — TELEPHONE ENCOUNTER
Sleep Study(1/2020)-showed +THERESA with recommended: Auto-CPAP at 6-10cm pressure.  'Have placed order and spoken with pt/family.  Isaac, please fax CPAP orders to [facility] for start of Home CPAP.  Thanks

## 2020-08-15 ENCOUNTER — HOSPITAL ENCOUNTER (EMERGENCY)
Facility: HOSPITAL | Age: 60
Discharge: HOME | End: 2020-08-15
Attending: EMERGENCY MEDICINE
Payer: MEDICARE

## 2020-08-15 VITALS
WEIGHT: 178 LBS | SYSTOLIC BLOOD PRESSURE: 111 MMHG | HEIGHT: 64 IN | HEART RATE: 96 BPM | BODY MASS INDEX: 30.39 KG/M2 | DIASTOLIC BLOOD PRESSURE: 72 MMHG | RESPIRATION RATE: 23 BRPM | OXYGEN SATURATION: 100 % | TEMPERATURE: 98 F

## 2020-08-15 DIAGNOSIS — J45.901 MODERATE ASTHMA WITH EXACERBATION, UNSPECIFIED WHETHER PERSISTENT: Primary | ICD-10-CM

## 2020-08-15 DIAGNOSIS — R06.02 SOB (SHORTNESS OF BREATH): ICD-10-CM

## 2020-08-15 LAB
ALBUMIN SERPL BCP-MCNC: 3.9 G/DL (ref 3.5–5.2)
ALP SERPL-CCNC: 127 U/L (ref 55–135)
ALT SERPL W/O P-5'-P-CCNC: 10 U/L (ref 10–44)
ANION GAP SERPL CALC-SCNC: 20 MMOL/L (ref 8–16)
AST SERPL-CCNC: 9 U/L (ref 10–40)
BASOPHILS # BLD AUTO: 0.04 K/UL (ref 0–0.2)
BASOPHILS NFR BLD: 0.4 % (ref 0–1.9)
BILIRUB SERPL-MCNC: 0.4 MG/DL (ref 0.1–1)
BNP SERPL-MCNC: 22 PG/ML (ref 0–99)
BUN SERPL-MCNC: 66 MG/DL (ref 8–23)
CALCIUM SERPL-MCNC: 8.9 MG/DL (ref 8.7–10.5)
CHLORIDE SERPL-SCNC: 95 MMOL/L (ref 95–110)
CO2 SERPL-SCNC: 23 MMOL/L (ref 23–29)
CREAT SERPL-MCNC: 13 MG/DL (ref 0.5–1.4)
DIFFERENTIAL METHOD: ABNORMAL
EOSINOPHIL # BLD AUTO: 0.2 K/UL (ref 0–0.5)
EOSINOPHIL NFR BLD: 1.6 % (ref 0–8)
ERYTHROCYTE [DISTWIDTH] IN BLOOD BY AUTOMATED COUNT: 14.4 % (ref 11.5–14.5)
EST. GFR  (AFRICAN AMERICAN): 3.2 ML/MIN/1.73 M^2
EST. GFR  (NON AFRICAN AMERICAN): 2.7 ML/MIN/1.73 M^2
GLUCOSE SERPL-MCNC: 313 MG/DL (ref 70–110)
HCT VFR BLD AUTO: 34.1 % (ref 37–48.5)
HGB BLD-MCNC: 10 G/DL (ref 12–16)
IMM GRANULOCYTES # BLD AUTO: 0.06 K/UL (ref 0–0.04)
IMM GRANULOCYTES NFR BLD AUTO: 0.6 % (ref 0–0.5)
LYMPHOCYTES # BLD AUTO: 1.4 K/UL (ref 1–4.8)
LYMPHOCYTES NFR BLD: 13.7 % (ref 18–48)
MAGNESIUM SERPL-MCNC: 3.4 MG/DL (ref 1.6–2.6)
MCH RBC QN AUTO: 30.3 PG (ref 27–31)
MCHC RBC AUTO-ENTMCNC: 29.3 G/DL (ref 32–36)
MCV RBC AUTO: 103 FL (ref 82–98)
MONOCYTES # BLD AUTO: 0.7 K/UL (ref 0.3–1)
MONOCYTES NFR BLD: 6.5 % (ref 4–15)
NEUTROPHILS # BLD AUTO: 8 K/UL (ref 1.8–7.7)
NEUTROPHILS NFR BLD: 77.2 % (ref 38–73)
NRBC BLD-RTO: 0 /100 WBC
PLATELET # BLD AUTO: 240 K/UL (ref 150–350)
PMV BLD AUTO: 10.7 FL (ref 9.2–12.9)
POTASSIUM SERPL-SCNC: 4.4 MMOL/L (ref 3.5–5.1)
PROT SERPL-MCNC: 8.3 G/DL (ref 6–8.4)
RBC # BLD AUTO: 3.3 M/UL (ref 4–5.4)
SODIUM SERPL-SCNC: 138 MMOL/L (ref 136–145)
WBC # BLD AUTO: 10.33 K/UL (ref 3.9–12.7)

## 2020-08-15 PROCEDURE — 93010 EKG 12-LEAD: ICD-10-PCS | Mod: HCNC,,, | Performed by: INTERNAL MEDICINE

## 2020-08-15 PROCEDURE — 36410 VNPNXR 3YR/> PHY/QHP DX/THER: CPT | Mod: HCNC,,, | Performed by: EMERGENCY MEDICINE

## 2020-08-15 PROCEDURE — 99285 PR EMERGENCY DEPT VISIT,LEVEL V: ICD-10-PCS | Mod: HCNC,25,, | Performed by: EMERGENCY MEDICINE

## 2020-08-15 PROCEDURE — 99285 EMERGENCY DEPT VISIT HI MDM: CPT | Mod: 25,HCNC

## 2020-08-15 PROCEDURE — 85025 COMPLETE CBC W/AUTO DIFF WBC: CPT | Mod: HCNC

## 2020-08-15 PROCEDURE — 36410 VNPNXR 3YR/> PHY/QHP DX/THER: CPT | Mod: HCNC

## 2020-08-15 PROCEDURE — 80053 COMPREHEN METABOLIC PANEL: CPT | Mod: HCNC

## 2020-08-15 PROCEDURE — 83735 ASSAY OF MAGNESIUM: CPT | Mod: HCNC

## 2020-08-15 PROCEDURE — 94640 AIRWAY INHALATION TREATMENT: CPT | Mod: HCNC

## 2020-08-15 PROCEDURE — 99900035 HC TECH TIME PER 15 MIN (STAT): Mod: HCNC

## 2020-08-15 PROCEDURE — 93005 ELECTROCARDIOGRAM TRACING: CPT | Mod: HCNC

## 2020-08-15 PROCEDURE — 25000242 PHARM REV CODE 250 ALT 637 W/ HCPCS: Mod: HCNC | Performed by: NURSE PRACTITIONER

## 2020-08-15 PROCEDURE — 99285 EMERGENCY DEPT VISIT HI MDM: CPT | Mod: HCNC,25,, | Performed by: EMERGENCY MEDICINE

## 2020-08-15 PROCEDURE — 83880 ASSAY OF NATRIURETIC PEPTIDE: CPT | Mod: HCNC

## 2020-08-15 PROCEDURE — 93010 ELECTROCARDIOGRAM REPORT: CPT | Mod: HCNC,,, | Performed by: INTERNAL MEDICINE

## 2020-08-15 PROCEDURE — 27000221 HC OXYGEN, UP TO 24 HOURS: Mod: HCNC

## 2020-08-15 PROCEDURE — 36410 PR VENIPUNC NEED PHYS SKILL,DX OR RX: ICD-10-PCS | Mod: HCNC,,, | Performed by: EMERGENCY MEDICINE

## 2020-08-15 RX ORDER — IPRATROPIUM BROMIDE AND ALBUTEROL SULFATE 2.5; .5 MG/3ML; MG/3ML
SOLUTION RESPIRATORY (INHALATION)
Status: DISCONTINUED
Start: 2020-08-15 | End: 2020-08-15 | Stop reason: HOSPADM

## 2020-08-15 RX ORDER — IPRATROPIUM BROMIDE AND ALBUTEROL SULFATE 2.5; .5 MG/3ML; MG/3ML
3 SOLUTION RESPIRATORY (INHALATION)
Status: COMPLETED | OUTPATIENT
Start: 2020-08-15 | End: 2020-08-15

## 2020-08-15 RX ADMIN — IPRATROPIUM BROMIDE AND ALBUTEROL SULFATE 3 ML: .5; 2.5 SOLUTION RESPIRATORY (INHALATION) at 11:08

## 2020-08-15 NOTE — ED TRIAGE NOTES
Pt called EMS when she was SOB, no longer SOB. Of note, pt does home dialysis every day, has Hx of CVA and bronchitis.

## 2020-08-15 NOTE — ED NOTES
Patient cleansed with new brief applied. Assisted with dress. Patient called  for ride home. Will continue to monitor.

## 2020-08-15 NOTE — PROCEDURES - EMERGENCY DEPT.
External Jugular IV    Date/Time: 8/15/2020 1:10 PM  Performed by: Colin Monsalve MD  Authorized by: Colin Monsalve MD   Consent Done: Yes  Consent: Verbal consent obtained. Written consent not obtained.  Risks and benefits: risks, benefits and alternatives were discussed  Consent given by: patient  Patient understanding: patient states understanding of the procedure being performed  Patient consent: the patient's understanding of the procedure matches consent given  Location (Ext Jugular): Right.  Area Prepped With: Chlorohexidine and Alcohol.  Catheter Size: 20 ga.  Catheter Type: Jelco.  Number of attempts: 1  Fixation/Dressing: Taped in place and Tegaderm.  Patient tolerance: Patient tolerated the procedure well with no immediate complications

## 2020-08-15 NOTE — ED PROVIDER NOTES
Encounter Date: 8/15/2020       History     Chief Complaint   Patient presents with    Shortness of Breath     arrived by NO EMS, SOB ended before EMS arrived. Hx of bronchitis, CVA     Patient is a 59-year-old female with medical history of anxiety, asthma, bipolar disorder, CHF, CKD on dialysis presenting to the ED for increased shortness of breath this morning.  Patient states she had a 5 min episode when she felt like she could not catch air.  Patient does wear home O2 but did not use her inhaler at that time.  Patient states symptoms resolved with using her inhaler.    The history is provided by the patient and medical records.     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    Cortical cataract of both eyes 7/26/2016    Decubitus ulcer of buttock, stage 2     Degeneration of lumbar or lumbosacral intervertebral disc 3/5/2013    S/p MRI L-spine 5/2009     Depression     Encounter for blood transfusion     ESRD on hemodialysis     Started August 2018    General anesthetics causing adverse effect in therapeutic use     Hemorrhagic cerebrovascular accident (CVA)     8/2016 s/p Hemicraniotomy at [facility] with Left hemiparesis    History of stroke 6/28/2017    s/p R-MCA stroke with R-putaminal hemorrhagic transformation in 8/2016 and 11/2016 (s/p hemicraniotomy at [facility]) with residual L hemiparesis, on AED s/p CVA      Hypertensive retinopathy of both eyes 7/26/2016    Impingement syndrome  of right shoulder 7/2/2014    Obesity     THERESA (obstructive sleep apnea)     s/p CPAP Titration 1/2020: Auto CPAP 6-10cm/Face mask    Partial symptomatic epilepsy with complex partial seizures, not intractable, without status epilepticus     Rheumatoid arthritis(714.0)     Rotator cuff tear 7/2/2014    Sarcoidosis     Stroke 2016    left sided flaccidity, SAH    Vertebral artery stenosis 3/5/2013    S/p Stenting per Dr Burnett      Past Surgical History:   Procedure Laterality Date    BREAST SURGERY      breast reduction    COLONOSCOPY N/A 8/11/2016    Procedure: COLONOSCOPY;  Surgeon: Jerry Vilchis MD;  Location: [facility] ENDO (4TH FLR);  Service: Endoscopy;  Laterality: N/A;  Patient reports difficulty awaking from anesthesia in the past.    DECLOTTING OF VASCULAR GRAFT Right 6/20/2019    Procedure: DECLOT-GRAFT;  Surgeon: Cabrera Irwin MD;  Location: [facility] CATH LAB;  Service: Cardiology;  Laterality: Right;    DECLOTTING OF VASCULAR GRAFT Right 12/6/2019    Procedure: DECLOT-GRAFT;  Surgeon: Cabrera Irwin MD;  Location: [facility] OR 2ND FLR;  Service: Peripheral Vascular;  Laterality: Right;    DECLOTTING OF VASCULAR GRAFT N/A 6/10/2020    Procedure: Declotting, Vascular Graft;  Surgeon: SERENA Buchanan II, MD;  Location: [facility] CATH LAB;  Service: Vascular;  Laterality: N/A;    DIAGNOSTIC LAPAROSCOPY N/A 6/8/2020    Procedure: LAPAROSCOPY, DIAGNOSTIC (POSSIBLE PD CATH REVISION);  Surgeon: Paige Monsalve MD;  Location: [facility] OR 2ND FLR;  Service: General;  Laterality: N/A;    FISTULOGRAM Right 2/11/2019    Procedure: Fistulogram;  Surgeon: Meir Valencia MD;  Location: [facility] CATH LAB;  Service: Peripheral Vascular;  Laterality: Right;    FISTULOGRAM Right 7/8/2019    Procedure: Fistulogram;  Surgeon: WELLINGTON Palm III, MD;  Location: [facility] CATH LAB;  Service: Peripheral Vascular;  Laterality: Right;    FISTULOGRAM Right 12/6/2019    Procedure: Fistulogram;  Surgeon: Cabrera IBARRA  "MD Alida;  Location: [facility] OR [facility] FLR;  Service: Peripheral Vascular;  Laterality: Right;    FISTULOGRAM Right 3/12/2020    Procedure: FISTULOGRAM;  Surgeon: Meir Valencia MD;  Location: [facility] CATH LAB;  Service: Peripheral Vascular;  Laterality: Right;    HYSTERECTOMY  1999    JOSE LUIS/BSO (AUB)    LAPAROSCOPIC LYSIS OF ADHESIONS N/A 3/9/2020    Procedure: LYSIS, ADHESIONS, LAPAROSCOPIC;  Surgeon: Paige Monsalve MD;  [facility];  Service: General;  Laterality: N/A;    LAPAROSCOPIC REVISION OF PROCEDURE INVOLVING PERITONEAL DIALYSIS CATHETER N/A 6/8/2020    Procedure: REVISION OF PROCEDURE INVOLVING PERITONEAL DIALYSIS CATHETER, LAPAROSCOPIC;  Surgeon: Paige Monsalve MD;  Location: [facility] OR [address] [place] [address];  Service: General;  Laterality: N/A;    PLACEMENT OF ARTERIOVENOUS GRAFT Right 10/18/2018    Procedure: AV GRAFT CREATION;  Surgeon: Meir Valencia MD;  Location: [facility] OR [address] [place] [address];  Service: Cardiovascular;  Laterality: Right;    ROTATOR CUFF REPAIR Right July 9, 2014    right side    Skull surgery      Aneurysm    stent placed      in vertebral artery    TOTAL REDUCTION MAMMOPLASTY      TUBAL LIGATION       Family History   Problem Relation Age of Onset    Diabetes Mother     Hypertension Mother     Heart disease Mother     Heart attack Mother     Breast cancer Mother     Stroke Sister     Hypertension Sister     Sleep apnea Sister     No Known Problems Daughter     Diabetes Son     Bell's palsy Sister     Lupus Sister     Blindness Maternal Grandmother     Diabetes Unknown         "My entire family family has diabetes"    Stroke Maternal Aunt     Colon cancer Neg Hx     Ovarian cancer Neg Hx      Social History     Tobacco Use    Smoking status: Never Smoker    Smokeless tobacco: Never Used   Substance Use Topics    Alcohol use: Yes     Frequency: Monthly or less     Drinks per session: 1 or 2     Binge frequency: Never     Comment: occasional wine " cooler     Drug use: Never     Review of Systems   Constitutional: Positive for activity change. Negative for appetite change, chills, fatigue and fever.   HENT: Negative for congestion and sore throat.    Respiratory: Positive for chest tightness, shortness of breath and wheezing. Negative for cough.    Cardiovascular: Negative for chest pain.   Gastrointestinal: Negative for abdominal pain, constipation, diarrhea, nausea and vomiting.   Genitourinary: Negative for dysuria.   Musculoskeletal: Negative for back pain and myalgias.   Skin: Negative for rash.   Allergic/Immunologic: Negative for immunocompromised state.   Neurological: Negative for dizziness, syncope, weakness, numbness and headaches.   Hematological: Does not bruise/bleed easily.   All other systems reviewed and are negative.      Physical Exam     Initial Vitals [08/15/20 1120]   BP Pulse Resp Temp SpO2   (!) 114/52 92 (!) 22 97 °F (36.1 °C) 100 %      MAP       --         Physical Exam    Nursing note and vitals reviewed.  Constitutional: Vital signs are normal. She appears well-developed and well-nourished. She is Obese . She is cooperative. No distress. Nasal cannula and face mask in place.   HENT:   Head: Normocephalic and atraumatic.   Nose: Nose normal.   Mouth/Throat: Uvula is midline, oropharynx is clear and moist and mucous membranes are normal.   Eyes: Conjunctivae, EOM and lids are normal. Pupils are equal, round, and reactive to light.   Neck: Trachea normal, normal range of motion and phonation normal. Neck supple. No spinous process tenderness and no muscular tenderness present. No JVD present.   Cardiovascular: Normal rate, regular rhythm and intact distal pulses.   Pulses:       Radial pulses are 2+ on the right side and 2+ on the left side.   Pulmonary/Chest: Effort normal. She has decreased breath sounds. She has wheezes in the right lower field and the left lower field.   Home O2 in place at 2L.  100%   Abdominal: Soft. Normal  appearance and bowel sounds are normal. There is no abdominal tenderness.   Neurological: She is alert and oriented to person, place, and time. No sensory deficit. GCS eye subscore is 4. GCS verbal subscore is 5. GCS motor subscore is 6.   Skin: Skin is warm, dry and intact. Capillary refill takes 2 to 3 seconds. No pallor.         ED Course   Procedures  Labs Reviewed   CBC W/ AUTO DIFFERENTIAL - Abnormal; Notable for the following components:       Result Value    RBC 3.30 (*)     Hemoglobin 10.0 (*)     Hematocrit 34.1 (*)     Mean Corpuscular Volume 103 (*)     Mean Corpuscular Hemoglobin Conc 29.3 (*)     Immature Granulocytes 0.6 (*)     Gran # (ANC) 8.0 (*)     Immature Grans (Abs) 0.06 (*)     Gran% 77.2 (*)     Lymph% 13.7 (*)     All other components within normal limits   COMPREHENSIVE METABOLIC PANEL - Abnormal; Notable for the following components:    Glucose 313 (*)     BUN, Bld 66 (*)     Creatinine 13.0 (*)     AST 9 (*)     Anion Gap 20 (*)     eGFR if  3.2 (*)     eGFR if non  2.7 (*)     All other components within normal limits   MAGNESIUM - Abnormal; Notable for the following components:    Magnesium 3.4 (*)     All other components within normal limits   B-TYPE NATRIURETIC PEPTIDE          Imaging Results          X-Ray Chest AP Portable (Final result)  Result time 08/15/20 12:36:15    Final result by Monty Blue MD (08/15/20 12:36:15)                 Impression:      No acute process.    Stable chronic changes.      Electronically signed by: Monty Blue MD  Date:    08/15/2020  Time:    12:36             Narrative:    EXAMINATION:  XR CHEST AP PORTABLE    CLINICAL HISTORY:  Shortness of breath    TECHNIQUE:  Single frontal view of the chest was performed.    COMPARISON:  Multiple prior exams, most recent from 08/03/2020    FINDINGS:  Mild cardiomegaly similar to prior exam.  Atherosclerosis of the aortic arch.  Prominence of pulmonary vasculature.   Lungs are well expanded.  Elevation of the right hemidiaphragm.  No pleural effusion.  No pneumothorax.  Stent in the right subclavian region.  Osseous structures are unremarkable.                              X-Rays:   Independently Interpreted Readings:   Other Readings:  Reviewed by me, read by Radiology    Medical Decision Making:   History:   Old Medical Records: I decided to obtain old medical records.  Old Records Summarized: records from previous admission(s).       <> Summary of Records: Patient recently seen and evaluated for increased shortness of breath.  CTA negative for any acute abnormalities.  Labs showed acute heart failure and medications have been adjusted since that time.  Initial Assessment:   Emergent evaluation of a 59-year-old female presenting to the ED for increased shortness of breath this morning.  Patient states she had a 5 min episode when she could not catch her breath.  Patient states she was wearing her home O2 and used her inhaler.  Patient states symptoms resolved prior to EMS arrival.  Patient's daughter requested transport for evaluation.  Patient states last peritoneal dialysis was last night and she completed full session.  On exam patient is A&O x3.  Vital signs stable.  Home O2 in place at 2 L and patient is saturating 100%.  Breath sounds diminished with wheezes noted the bases.  No JVD noted.  Patient denies any chest pain.  Abdomen soft and nontender.  Cap refill less than 3 sec. Plus two radial and DP noted.  No pedal edema appreciated.  Differential Diagnosis:   Differential diagnoses include but are not limited to COPD, asthma exacerbation, CHF, bronchitis, pneumonia.      Independently Interpreted Test(s):   I have ordered and independently interpreted X-rays - see prior notes.  I have ordered and independently interpreted EKG Reading(s) - see prior notes  Clinical Tests:   Lab Tests: Reviewed and Ordered  The following lab test(s) were unremarkable: CBC, CMP and  BNP  Radiological Study: Reviewed and Ordered  Medical Tests: Reviewed and Ordered  ED Management:  I will get labs, imaging, breathing treatments and reassess.  I discussed this case with my supervising physician.                   ED Course as of Aug 15 1358   Sat Aug 15, 2020   1330 Patient's CBC unremarkable.  No leukocytosis noted.  H&H stable at 10 and 34.1.  CMP shows elevated BUN and creatinine at 66 and 13.  This is patient's baseline.  Mag within normal limits.  BNP negative at 22.  Chest x-ray shows no acute abnormalities.    [RZ]   1340 Patient updated on labAnd imaging results.  Patient states she is concerned for a boil on her buttocks.  Area reviewed with no redness, erythema or tenderness to palpation.  Patient advised she can use barrier cream for protection.  Get up and ambulate as tolerated.  Patient verbalized understanding of this plan of care.  Patient states she will call her daughter for transport home.    [RZ]   1357 Patient is hemodynamically stable, vital signs are normal. Discharge instructions given. Return to ED precautions discussed. Follow up as directed. Pt verbalized understanding of this plan.  Pt is stable for discharge.     [RZ]      ED Course User Index  [RZ] Kelsi Branham NP                Clinical Impression:       ICD-10-CM ICD-9-CM   1. Moderate asthma with exacerbation, unspecified whether persistent  J45.901 493.92   2. SOB (shortness of breath)  R06.02 786.05             ED Disposition Condition    Discharge Stable        ED Prescriptions     None        Follow-up Information     Follow up With Specialties Details Why Contact Info    Beverly Muniz MD Internal Medicine Schedule an appointment as soon as possible for a visit  As needed [address] [facility] [address]  [phone]                                       Kelsi Branham NP  08/15/20 7032

## 2020-08-15 NOTE — EKG INTERPRETATIONS - EMERGENCY DEPT.
Independently interpreted by MD:  Rate 96, NSR, no stemi, no ectopy, no hypertrophy, 1st degree AV block, long QT

## 2020-08-15 NOTE — DISCHARGE INSTRUCTIONS
Please use your inhaler when you become short of breath.  Continue to wear your oxygen at home.  Follow-up with your PCP as scheduled.    Our goal in the emergency department is to always give you outstanding care and exceptional service. You may receive a survey by mail or e-mail in the next week regarding your experience in our ED. We would greatly appreciate your completing and returning the survey. Your feedback provides us with a way to recognize our staff who give very good care and it helps us learn how to improve when your experience was below our aspiration of excellence.

## 2020-08-19 ENCOUNTER — LAB VISIT (OUTPATIENT)
Dept: LAB | Facility: HOSPITAL | Age: 60
End: 2020-08-19
Attending: INTERNAL MEDICINE
Payer: MEDICARE

## 2020-08-19 DIAGNOSIS — I10 ESSENTIAL HYPERTENSION: ICD-10-CM

## 2020-08-19 DIAGNOSIS — Z79.4 TYPE 2 DIABETES MELLITUS WITH CHRONIC KIDNEY DISEASE ON CHRONIC DIALYSIS, WITH LONG-TERM CURRENT USE OF INSULIN: ICD-10-CM

## 2020-08-19 DIAGNOSIS — E78.5 HYPERLIPIDEMIA, UNSPECIFIED HYPERLIPIDEMIA TYPE: ICD-10-CM

## 2020-08-19 DIAGNOSIS — N18.6 TYPE 2 DIABETES MELLITUS WITH CHRONIC KIDNEY DISEASE ON CHRONIC DIALYSIS, WITH LONG-TERM CURRENT USE OF INSULIN: ICD-10-CM

## 2020-08-19 DIAGNOSIS — E55.9 VITAMIN D DEFICIENCY: ICD-10-CM

## 2020-08-19 DIAGNOSIS — Z99.2 TYPE 2 DIABETES MELLITUS WITH CHRONIC KIDNEY DISEASE ON CHRONIC DIALYSIS, WITH LONG-TERM CURRENT USE OF INSULIN: ICD-10-CM

## 2020-08-19 DIAGNOSIS — E11.22 TYPE 2 DIABETES MELLITUS WITH CHRONIC KIDNEY DISEASE ON CHRONIC DIALYSIS, WITH LONG-TERM CURRENT USE OF INSULIN: ICD-10-CM

## 2020-08-19 LAB
ALBUMIN SERPL BCP-MCNC: 3.9 G/DL (ref 3.5–5.2)
ALP SERPL-CCNC: 126 U/L (ref 55–135)
ALT SERPL W/O P-5'-P-CCNC: 9 U/L (ref 10–44)
ANION GAP SERPL CALC-SCNC: 25 MMOL/L (ref 8–16)
AST SERPL-CCNC: 12 U/L (ref 10–40)
BASOPHILS # BLD AUTO: 0.07 K/UL (ref 0–0.2)
BASOPHILS NFR BLD: 0.7 % (ref 0–1.9)
BILIRUB SERPL-MCNC: 0.5 MG/DL (ref 0.1–1)
BUN SERPL-MCNC: 80 MG/DL (ref 8–23)
CALCIUM SERPL-MCNC: 10 MG/DL (ref 8.7–10.5)
CHLORIDE SERPL-SCNC: 96 MMOL/L (ref 95–110)
CHOLEST SERPL-MCNC: 160 MG/DL (ref 120–199)
CHOLEST/HDLC SERPL: 3.4 {RATIO} (ref 2–5)
CO2 SERPL-SCNC: 19 MMOL/L (ref 23–29)
CREAT SERPL-MCNC: 13.2 MG/DL (ref 0.5–1.4)
DIFFERENTIAL METHOD: ABNORMAL
EOSINOPHIL # BLD AUTO: 0.3 K/UL (ref 0–0.5)
EOSINOPHIL NFR BLD: 2.7 % (ref 0–8)
ERYTHROCYTE [DISTWIDTH] IN BLOOD BY AUTOMATED COUNT: 14.6 % (ref 11.5–14.5)
EST. GFR  (AFRICAN AMERICAN): 3.1 ML/MIN/1.73 M^2
EST. GFR  (NON AFRICAN AMERICAN): 2.7 ML/MIN/1.73 M^2
GLUCOSE SERPL-MCNC: 199 MG/DL (ref 70–110)
HCT VFR BLD AUTO: 34.5 % (ref 37–48.5)
HDLC SERPL-MCNC: 47 MG/DL (ref 40–75)
HDLC SERPL: 29.4 % (ref 20–50)
HGB BLD-MCNC: 10.3 G/DL (ref 12–16)
IMM GRANULOCYTES # BLD AUTO: 0.05 K/UL (ref 0–0.04)
IMM GRANULOCYTES NFR BLD AUTO: 0.5 % (ref 0–0.5)
LDLC SERPL CALC-MCNC: 83 MG/DL (ref 63–159)
LYMPHOCYTES # BLD AUTO: 1.3 K/UL (ref 1–4.8)
LYMPHOCYTES NFR BLD: 13.6 % (ref 18–48)
MCH RBC QN AUTO: 31 PG (ref 27–31)
MCHC RBC AUTO-ENTMCNC: 29.9 G/DL (ref 32–36)
MCV RBC AUTO: 104 FL (ref 82–98)
MONOCYTES # BLD AUTO: 0.7 K/UL (ref 0.3–1)
MONOCYTES NFR BLD: 6.8 % (ref 4–15)
NEUTROPHILS # BLD AUTO: 7.3 K/UL (ref 1.8–7.7)
NEUTROPHILS NFR BLD: 75.7 % (ref 38–73)
NONHDLC SERPL-MCNC: 113 MG/DL
NRBC BLD-RTO: 0 /100 WBC
PLATELET # BLD AUTO: 239 K/UL (ref 150–350)
PMV BLD AUTO: 10.9 FL (ref 9.2–12.9)
POTASSIUM SERPL-SCNC: 4.9 MMOL/L (ref 3.5–5.1)
PROT SERPL-MCNC: 8.3 G/DL (ref 6–8.4)
RBC # BLD AUTO: 3.32 M/UL (ref 4–5.4)
SODIUM SERPL-SCNC: 140 MMOL/L (ref 136–145)
TRIGL SERPL-MCNC: 150 MG/DL (ref 30–150)
TSH SERPL DL<=0.005 MIU/L-ACNC: 1.76 UIU/ML (ref 0.4–4)
WBC # BLD AUTO: 9.65 K/UL (ref 3.9–12.7)

## 2020-08-19 PROCEDURE — 85025 COMPLETE CBC W/AUTO DIFF WBC: CPT | Mod: HCNC

## 2020-08-19 PROCEDURE — 82306 VITAMIN D 25 HYDROXY: CPT | Mod: HCNC

## 2020-08-19 PROCEDURE — 84443 ASSAY THYROID STIM HORMONE: CPT | Mod: HCNC

## 2020-08-19 PROCEDURE — 36415 COLL VENOUS BLD VENIPUNCTURE: CPT | Mod: HCNC,PN

## 2020-08-19 PROCEDURE — 80061 LIPID PANEL: CPT | Mod: HCNC

## 2020-08-19 PROCEDURE — 80053 COMPREHEN METABOLIC PANEL: CPT | Mod: HCNC

## 2020-08-19 PROCEDURE — 83036 HEMOGLOBIN GLYCOSYLATED A1C: CPT | Mod: HCNC

## 2020-08-20 LAB
25(OH)D3+25(OH)D2 SERPL-MCNC: 27 NG/ML (ref 30–96)
ESTIMATED AVG GLUCOSE: 154 MG/DL (ref 68–131)
ESTIMATED AVG GLUCOSE: 154 MG/DL (ref 68–131)
HBA1C MFR BLD HPLC: 7 % (ref 4–5.6)
HBA1C MFR BLD HPLC: 7 % (ref 4–5.6)

## 2020-08-28 ENCOUNTER — TELEPHONE (OUTPATIENT)
Dept: INTERNAL MEDICINE | Facility: CLINIC | Age: 60
End: 2020-08-28

## 2020-08-28 ENCOUNTER — OFFICE VISIT (OUTPATIENT)
Dept: INTERNAL MEDICINE | Facility: CLINIC | Age: 60
End: 2020-08-28
Payer: MEDICARE

## 2020-08-28 VITALS — HEART RATE: 66 BPM | OXYGEN SATURATION: 86 % | WEIGHT: 177.94 LBS | HEIGHT: 64 IN | BODY MASS INDEX: 30.38 KG/M2

## 2020-08-28 DIAGNOSIS — I10 ESSENTIAL HYPERTENSION: ICD-10-CM

## 2020-08-28 DIAGNOSIS — E66.9 TYPE 2 DIABETES MELLITUS WITH OBESITY: Primary | ICD-10-CM

## 2020-08-28 DIAGNOSIS — G47.33 OSA (OBSTRUCTIVE SLEEP APNEA): ICD-10-CM

## 2020-08-28 DIAGNOSIS — F41.8 MIXED ANXIETY AND DEPRESSIVE DISORDER: ICD-10-CM

## 2020-08-28 DIAGNOSIS — I50.32 CHRONIC DIASTOLIC HEART FAILURE: ICD-10-CM

## 2020-08-28 DIAGNOSIS — Z99.2 ESRD ON PERITONEAL DIALYSIS: ICD-10-CM

## 2020-08-28 DIAGNOSIS — D86.9 SARCOIDOSIS: Chronic | ICD-10-CM

## 2020-08-28 DIAGNOSIS — E11.69 TYPE 2 DIABETES MELLITUS WITH OBESITY: Primary | ICD-10-CM

## 2020-08-28 DIAGNOSIS — I69.359 HEMIPLEGIA AS LATE EFFECT OF STROKE: ICD-10-CM

## 2020-08-28 DIAGNOSIS — N18.6 ESRD ON PERITONEAL DIALYSIS: ICD-10-CM

## 2020-08-28 PROCEDURE — 3051F PR MOST RECENT HEMOGLOBIN A1C LEVEL 7.0 - < 8.0%: ICD-10-PCS | Mod: HCNC,CPTII,S$GLB, | Performed by: NURSE PRACTITIONER

## 2020-08-28 PROCEDURE — 99999 PR PBB SHADOW E&M-EST. PATIENT-LVL V: CPT | Mod: PBBFAC,HCNC,, | Performed by: NURSE PRACTITIONER

## 2020-08-28 PROCEDURE — 3008F PR BODY MASS INDEX (BMI) DOCUMENTED: ICD-10-PCS | Mod: HCNC,CPTII,S$GLB, | Performed by: NURSE PRACTITIONER

## 2020-08-28 PROCEDURE — 99499 UNLISTED E&M SERVICE: CPT | Mod: HCNC,S$GLB,, | Performed by: NURSE PRACTITIONER

## 2020-08-28 PROCEDURE — 99499 RISK ADDL DX/OHS AUDIT: ICD-10-PCS | Mod: HCNC,S$GLB,, | Performed by: NURSE PRACTITIONER

## 2020-08-28 PROCEDURE — 99214 PR OFFICE/OUTPT VISIT, EST, LEVL IV, 30-39 MIN: ICD-10-PCS | Mod: HCNC,S$GLB,, | Performed by: NURSE PRACTITIONER

## 2020-08-28 PROCEDURE — 99214 OFFICE O/P EST MOD 30 MIN: CPT | Mod: HCNC,S$GLB,, | Performed by: NURSE PRACTITIONER

## 2020-08-28 PROCEDURE — 3051F HG A1C>EQUAL 7.0%<8.0%: CPT | Mod: HCNC,CPTII,S$GLB, | Performed by: NURSE PRACTITIONER

## 2020-08-28 PROCEDURE — 99999 PR PBB SHADOW E&M-EST. PATIENT-LVL V: ICD-10-PCS | Mod: PBBFAC,HCNC,, | Performed by: NURSE PRACTITIONER

## 2020-08-28 PROCEDURE — 3008F BODY MASS INDEX DOCD: CPT | Mod: HCNC,CPTII,S$GLB, | Performed by: NURSE PRACTITIONER

## 2020-08-28 NOTE — PATIENT INSTRUCTIONS
Snacks can be an important part of a balanced, healthy meal plan. They allow you to eat more frequently, feeling full and satisfied throughout the day. Also, they allow you to spread carbohydrates evenly, which may stabilize blood sugars.  Plus, snacks are enjoyable!     The amount of carbohydrate needed at snacks varies. Generally, about 15-30 grams of carbohydrate per snack is recommended.  Below you will find some tasty treats.       0-5 gm carb   Crystal Light   Vitamin Water Zero   Herbal tea, unsweetened   2 tsp peanut butter on celery   1./2 cup sugar-free jell-o   1 sugar-free popsicle   ¼ cup blueberries   8oz Blue Chika unsweetened almond milk   5 baby carrots & celery sticks, cucumbers, bell peppers dipped in ¼ cup salsa, 2Tbsp light ranch dressing or 2Tbsp plain Greek yogurt   10 Goldfish crackers   ½ oz low-fat cheese or string cheese   1 closed handful of nuts, unsalted   1 Tbsp of sunflower seeds, unsalted   1 cup Smart Pop popcorn   1 whole grain brown rice cake        15 gm carb   1 small piece of fruit or ½ banana or 1/2 cup lite canned fruit   3 larissa cracker squares   3 cups Smart Pop popcorn, top spray butter, Sarmiento lite salt or cinnamon and Truvia   5 Vanilla Wafers   ½ cup low fat, no added sugar ice cream or frozen yogurt (Blue bell, Blue Bunny, Weight Watchers, Skinny Cow)   ½ turkey, ham, or chicken sandwich   ½ c fruit with ½ c Cottage cheese   4-6 unsalted wheat crackers with 1 oz low fat cheese or 1 tbsp peanut butter    30-45 goldfish crackers (depending on flavor)    7-8 Denominational mini brown rice cakes (caramel, apple cinnamon, chocolate)    12 Denominational mini brown rice cakes (cheddar, bbq, ranch)    1/3 cup hummus dip with raw veg   1/2 whole wheat triston, 1Tbsp hummus   Mini Pizza (1/2 whole wheat English muffin, low-fat  cheese, tomato sauce)   100 calorie snack pack (Oreo, Chips Ahoy, Ritz Mix, Baked Cheetos)   4-6 oz. light or Greek Style yogurt  (Chotalibi, Yoplait, Okgreg, [facility])   ½ cup sugar-free pudding     6 in. wheat tortilla or triston oven toasted chips (topped with spray butter flavoring, cinnamon, Truvia OR spray butter, garlic powder, chili powder)    18 BBQ Popchips (available at [employer], Whole Foods, Fresh Market)                   Hypoglycemia (Low Blood Sugar)     Fast-acting sugar includes a cup of nonfat milk.     Too little sugar (glucose) in your blood is called hypoglycemia or low blood sugar. Low blood sugar usually means anything lower than 70 mg/dL. Talk with your healthcare provider about your target range and what level is too low for you. Diabetes itself doesnt cause low blood sugar. But some of the treatments for diabetes, such as pills or insulin, may raise your risk for it. Low blood sugar may cause you to pass out or have a seizure. So always treat low blood sugar right away, but don't overeat.  Special note: Always carry a source of fast-acting sugar and a snack in case of hypoglycemia.   What you may notice  If you have low blood sugar, you may have one or more of these symptoms:  · Shakiness or dizziness  · Cold, clammy skin or sweating  · Feelings of hunger  · Headache  · Nervousness  · A hard, fast heartbeat  · Weakness  · Confusion or irritability  · Blurred vision  · Having nightmares or waking up confused or sweating  · Numbness or tingling in the lips or tongue  What you should do  Here are tips to follow if you have hypoglycemia:   · First check your blood sugar. If it is too low (out of your target range), eat or drink 15 to 20 grams of fast-acting sugar. This may be 3 to 4 glucose tablets, 4 ounces (half a cup) of fruit juice or regular (nondiet) soda, 8 ounces (1 cup) of fat-free milk, or 1 tablespoon of honey. Dont take more than this, or your blood sugar may go too high.  · Wait 15 minutes. Then recheck your blood sugar if you can.  · If your blood sugar is still too low, repeat the steps above and check your  blood sugar again. If your blood sugar still has not returned to your target range, contact your healthcare provider or seek emergency care.  · Once your blood sugar returns to target range, eat a snack or meal.  Preventing low blood sugar  Things you can do include the following:   · If your condition needs a strict treatment plan, eat your meals and snacks at the same times each day. Dont skip meals!  · If your treatment plan lets you change when you eat and what you eat, learn how to change the time and dose of your rapid-acting insulin to match this.   · Ask your healthcare provider if it is safe for you to drink alcohol. Never drink on an empty stomach.  · Take your medicine at the prescribed times.  · Always carry a source of fast-acting sugar and a snack when youre away from home.  Other things to do  Additional tips include the following:  · Carry a medical ID card, a compact USB drive, or wear a medical alert bracelet or necklace. It should say that you have diabetes. It should also say what to do if you pass out or have a seizure.  · Make sure your family, friends, and coworkers know the signs of low blood sugar. Tell them what to do if your blood sugar falls very low and you cant treat yourself.  · Keep a glucagon emergency kit handy. Be sure your family, friends, and coworkers know how and when to use it. Check it regularly and replace the glucagon before it expires.  · Talk with your health care team about other things you can do to prevent low blood sugar.     If you have unexplained hypoglycemia or hypoglycemia several times, call your healthcare provider.   Date Last Reviewed: 5/1/2016  © 9480-3304 [employer]. [address] [place] [address], [place] [address]. All rights reserved. This information is not intended as a substitute for professional medical care. Always follow your healthcare professional's instructions.        Managing Diabetes: The A1C Test       Healthy red blood cells have  some glucose stuck to them. A high A1C means that unhealthy amounts of glucose are stuck to the cells.   What is the A1C test?  Using your meter helps you track your blood sugar every day. But your glucose meter tells you the value at the time of testing only. You also need to know if your treatment plan is keeping you healthy over time. The hemoglobin A1C (or glycated hemoglobin) test can help. This test measures your average blood sugar level over a few months. A higher A1C result means that you have a higher risk of developing complications.  The A1C test  The A1C is a blood test done by your healthcare provider. You will likely have an A1C test every 3 to 6 months.  Your blood glucose goal  A1C has been shown as a percentage. But it can also be shown as a number representing the estimated Average Glucose (eAG). Unlike the A1C percentage, eAG is a number similar to the numbers listed on your daily glucose monitor. Both A1C and eAG measure the amount of glucose stuck to a protein called hemoglobin in red blood cells. Your healthcare provider will help you figure out what your ideal A1C or eAG should be. Your target number will depend on your age, general health, and other factors. If your current number is too high, your treatment plan may need changes, such as different medicines.  Sample results  Most people aim for an A1c lower than 7%. Thats an eAG less than 154 mg/dL. Or, your healthcare provider may want you to aim for an A1C of 6%. Thats an eAG of 126 mg/dL.     Glucose calculator  Visit http://professional.diabetes.org/diapro/glucose_calc for a chart that helps convert your A1C percentages into eAG numbers.   Date Last Reviewed: 6/1/2016  © 0168-7345 [employer]. [address] [place] [address], [place] [address]. All rights reserved. This information is not intended as a substitute for professional medical care. Always follow your healthcare professional's instructions.

## 2020-08-28 NOTE — PROGRESS NOTES
CHIEF COMPLAINT: Type 2 Diabetes     HPI: Mrs. Lucy Perez is a 60 y.o. female who was diagnosed with Type 2 DM in 1990s.  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    Cortical cataract of both eyes 7/26/2016    Decubitus ulcer of buttock, stage 2     Degeneration of lumbar or lumbosacral intervertebral disc 3/5/2013    S/p MRI L-spine 5/2009     Depression     Encounter for blood transfusion     ESRD on hemodialysis     Started August 2018    General anesthetics causing adverse effect in therapeutic use     Hemorrhagic cerebrovascular accident (CVA)     8/2016 s/p Hemicraniotomy at [facility] with Left hemiparesis    History of stroke 6/28/2017    s/p R-MCA stroke with R-putaminal hemorrhagic transformation in 8/2016 and 11/2016 (s/p hemicraniotomy at [facility]) with residual L hemiparesis, on AED s/p CVA      Hypertensive retinopathy of both eyes 7/26/2016    Impingement syndrome of right shoulder 7/2/2014    Obesity     THERESA (obstructive sleep apnea)     s/p CPAP Titration 1/2020: Auto CPAP 6-10cm/Face mask    Partial symptomatic epilepsy with complex partial seizures, not intractable, without status epilepticus     Rheumatoid arthritis(714.0)     Rotator cuff tear 7/2/2014    Sarcoidosis     Stroke 2016    left sided flaccidity, SAH    Vertebral artery stenosis 3/5/2013    S/p Stenting per Dr Burnett    Seen in past by JOHANNA Aguilera DNP in 2016  Last seen in person 9/2019 and audio visit was done 4/2020.  Accompanied by daughter, in w/c  VS obtained -took awhile  bp 109/64  Map 81  Sat 99%   Hr 88   Almost fall in parking lot today, arrived late for this  appointment  Missed f/u with Dr. Max last week.  Multiple ER/admissions:  8/5/20 asthma exacerbation   8/3/20 SOB   7/27/20 hypoxia  6/13/20     Has extensive history, ESRD, R-MCA, (L) hemiparesis,  HTN  a1c remains at goal  Has been on PD x 1 month    Lab Results   Component Value Date    HGBA1C 7.0 (H) 08/19/2020    HGBA1C 7.0 (H) 08/19/2020        PREVIOUS DIABETES MEDICATIONS TRIED  novolog  humalog  lantus  Metformin  glipizide    CURRENT DIABETIC MEDS: lantus 14 units at night 9-10p, novolog correction scale 180-230+1, etc, glipizide 2.5 mg xr with lunch  BG monitoring 4 times a day  Injections 4 times a day.  Wide fluctuations   H/o hypoglycemia  H/o severe hypoglycemia 30s-40s     Pt is monitoring blood glucose readings 4 times a day.  Needs >100 strips per month related to fluctuations with blood glucose reading, a1c trends, and activity level.  Highest 384 mg/dl around 7p  Lowest 124, 92  Fasting 124-290    Eating 3 meals a day    Diabetes Management Status    Statin: Taking  ACE/ARB: Not taking    Screening or Prevention Patient's value Goal Complete/Controlled?   HgA1C Testing and Control   Lab Results   Component Value Date    HGBA1C 7.0 (H) 08/19/2020    HGBA1C 7.0 (H) 08/19/2020      Annually/Less than 8% Yes   Lipid profile : 08/19/2020 Annually Yes   LDL control Lab Results   Component Value Date    LDLCALC 83.0 08/19/2020    Annually/Less than 100 mg/dl  Yes   Nephropathy screening Lab Results   Component Value Date    LABMICR 2395.0 11/11/2014     Lab Results   Component Value Date    PROTEINUA 1+ (A) 07/27/2020    Annually Yes   Blood pressure BP Readings from Last 1 Encounters:   08/15/20 111/72    Less than 140/90 Yes   Dilated retinal exam : 09/24/2019 Annually No   Foot exam   : 09/17/2019 Annually Yes   9/24/19 Dr. Blanton for eyes    REVIEW OF SYSTEMS  General: +(L) weakness, fatigue, +weight fluctuations  Eyes: no double or blurred vision, eye pain, or redness  Cardiovascular: no chest  "pain, palpitations, edema, or murmurs.   Respiratory: no cough or dyspnea.   GI: no heartburn, nausea, or changes in bowel patterns; good appetite.   Skin: no rashes, dryness, itching, or reactions at insulin injection sites.  Neuro:+ numbness, tingling (L), tremors, or vertigo.   Endocrine: no polyuria, polydipsia, polyphagia, heat or cold intolerance.      Vital Signs  Pulse 66   Ht 5' 4" (1.626 m)   Wt 80.7 kg (177 lb 14.6 oz)   LMP  (LMP Unknown)   SpO2 (!) 86%   BMI 30.54 kg/m²     Hemoglobin A1C   Date Value Ref Range Status   08/19/2020 7.0 (H) 4.0 - 5.6 % Final     Comment:     ADA Screening Guidelines:  5.7-6.4%  Consistent with prediabetes  >or=6.5%  Consistent with diabetes  High levels of fetal hemoglobin interfere with the HbA1C  assay. Heterozygous hemoglobin variants (HbS, HgC, etc)do  not significantly interfere with this assay.   However, presence of multiple variants may affect accuracy.     08/19/2020 7.0 (H) 4.0 - 5.6 % Final     Comment:     ADA Screening Guidelines:  5.7-6.4%  Consistent with prediabetes  >or=6.5%  Consistent with diabetes  High levels of fetal hemoglobin interfere with the HbA1C  assay. Heterozygous hemoglobin variants (HbS, HgC, etc)do  not significantly interfere with this assay.   However, presence of multiple variants may affect accuracy.     07/28/2020 6.8 (H) 4.0 - 5.6 % Final     Comment:     ADA Screening Guidelines:  5.7-6.4%  Consistent with prediabetes  >or=6.5%  Consistent with diabetes  High levels of fetal hemoglobin interfere with the HbA1C  assay. Heterozygous hemoglobin variants (HbS, HgC, etc)do  not significantly interfere with this assay.   However, presence of multiple variants may affect accuracy.          Chemistry        Component Value Date/Time     08/19/2020 1159    K 4.9 08/19/2020 1159    CL 96 08/19/2020 1159    CO2 19 (L) 08/19/2020 1159    BUN 80 (H) 08/19/2020 1159    CREATININE 13.2 (H) 08/19/2020 1159     (H) 08/19/2020 1159 "        Component Value Date/Time    CALCIUM 10.0 08/19/2020 1159    ALKPHOS 126 08/19/2020 1159    AST 12 08/19/2020 1159    ALT 9 (L) 08/19/2020 1159    BILITOT 0.5 08/19/2020 1159    ESTGFRAFRICA 3.1 (A) 08/19/2020 1159    EGFRNONAA 2.7 (A) 08/19/2020 1159           Lab Results   Component Value Date    TSH 1.760 08/19/2020      Lab Results   Component Value Date    CHOL 160 08/19/2020    CHOL 134 03/13/2020    CHOL 200 (H) 08/29/2019     Lab Results   Component Value Date    HDL 47 08/19/2020    HDL 50 03/13/2020    HDL 85 (H) 08/29/2019     Lab Results   Component Value Date    LDLCALC 83.0 08/19/2020    LDLCALC 62.6 (L) 03/13/2020    LDLCALC 98.2 08/29/2019     Lab Results   Component Value Date    TRIG 150 08/19/2020    TRIG 107 03/13/2020    TRIG 84 08/29/2019     Lab Results   Component Value Date    CHOLHDL 29.4 08/19/2020    CHOLHDL 37.3 03/13/2020    CHOLHDL 42.5 08/29/2019         PHYSICAL EXAMINATION  Constitutional: chronically ill appearing  Neck:  trachea midline.   Respiratory: No wheezes, even and unlabored.  Cardiovascular: RRR; no edema.   Lymph: deferred  Skin: warm and dry; no injection site reactions, +acanthosis nigracans observed. (R) fistula +/+  Neuro:patient alert and cooperative, normal affect; in w/c, (L) hemiparesis    Diabetes Foot Exam: deferred done 9/3/19    Assessment/Plan  1. Type 2 diabetes mellitus with obesity  Hemoglobin A1C    Ambulatory referral/consult to Diabetes Education   2. Chronic diastolic heart failure     3. THERESA (obstructive sleep apnea)     4. LEFT Hemiplegia as late effect of stroke     5. Mixed anxiety and depressive disorder     6. ESRD on peritoneal dialysis     7. Essential hypertension     8. Sarcoidosis       1. F/u in 3 mos w/ me   Remote access via [employer] 2  Starter kit given  DE in 1-2 weeks  Send chart note off to [facility]   Need cgm report to help guide mills or lantus use   Consider dpp4i or glp1a- pt assistance  2. Avoid cards  3. Needs cpap  4. F/u with  neuro prn, pcp  5. F/u with psych prn   6. F/u with dialysis   PD- elevations r/t exchanges daily   7. Continue med(s)  Controlled  8. F/u with rheumatology     FOLLOW UP  No follow-ups on file.

## 2020-09-01 ENCOUNTER — PATIENT OUTREACH (OUTPATIENT)
Dept: ADMINISTRATIVE | Facility: OTHER | Age: 60
End: 2020-09-01

## 2020-09-01 DIAGNOSIS — Z12.31 ENCOUNTER FOR SCREENING MAMMOGRAM FOR MALIGNANT NEOPLASM OF BREAST: Primary | ICD-10-CM

## 2020-09-01 NOTE — PROGRESS NOTES
Care Everywhere: updated  Immunization: updated  Health Maintenance: updated  Media Review:   Legacy Review:   Order placed: mammogram  Upcoming appts:

## 2020-09-02 ENCOUNTER — CLINICAL SUPPORT (OUTPATIENT)
Dept: DIABETES | Facility: CLINIC | Age: 60
End: 2020-09-02
Payer: MEDICARE

## 2020-09-02 DIAGNOSIS — E11.69 TYPE 2 DIABETES MELLITUS WITH OBESITY: ICD-10-CM

## 2020-09-02 DIAGNOSIS — E66.9 TYPE 2 DIABETES MELLITUS WITH OBESITY: ICD-10-CM

## 2020-09-02 PROCEDURE — G0108 PR DIAB MANAGE TRN  PER INDIV: ICD-10-PCS | Mod: HCNC,S$GLB,, | Performed by: INTERNAL MEDICINE

## 2020-09-02 PROCEDURE — 99999 PR PBB SHADOW E&M-EST. PATIENT-LVL II: ICD-10-PCS | Mod: PBBFAC,HCNC,,

## 2020-09-02 PROCEDURE — G0108 DIAB MANAGE TRN  PER INDIV: HCPCS | Mod: HCNC,S$GLB,, | Performed by: INTERNAL MEDICINE

## 2020-09-02 PROCEDURE — 99999 PR PBB SHADOW E&M-EST. PATIENT-LVL II: CPT | Mod: PBBFAC,HCNC,,

## 2020-09-03 VITALS — WEIGHT: 177 LBS | BODY MASS INDEX: 30.38 KG/M2

## 2020-09-03 NOTE — PROGRESS NOTES
Diabetes Education  Author: Emir Toribio RN  Date: 9/3/2020  Diabetes Care Management Summary  Diabetes Education Record Assessment/Progress: Initial  Current Diabetes Risk Level: Moderate   Last A1c:   Lab Results   Component Value Date    HGBA1C 7.0 (H) 08/19/2020    HGBA1C 7.0 (H) 08/19/2020     Last visit with Diabetes Educator: : 09/02/2020  Diabetes Type  Diabetes Type : Type II  Diabetes History  Diabetes Diagnosis: >10 years  Current Treatment: Oral Medication, Diet, Injectable, Exercise, Insulin  Health Maintenance was reviewed today with patient. Discussed with patient importance of routine eye exams, foot exams/foot care, blood work (i.e.: A1c, microalbumin, and lipid), dental visits, yearly flu vaccine, and pneumonia vaccine as indicated by PCP. Patient verbalized understanding.     Health Maintenance Topics with due status: Not Due       Topic Last Completion Date    TETANUS VACCINE 01/01/2019    Lipid Panel 08/19/2020    Hemoglobin A1c 08/19/2020     Health Maintenance Due   Topic Date Due    HIV Screening  08[birth year]24/1975    Shingles Vaccine (1 of 2) 08/24/2008    Influenza Vaccine (1) 08/01/2020    Foot Exam  09/17/2020    Eye Exam  09/24/2020    Mammogram  10/03/2020   Nutrition  Meal Planning: skipping meals, water, diet drinks, artificial sweeteners, snacks between meal  What type of sweetener do you use?: Splenda  What type of beverages do you drink?: diet soda/tea, water  Meal Plan 24 Hour Recall - Breakfast: nothing  Meal Plan 24 Hour Recall - Lunch: can't remember  Meal Plan 24 Hour Recall - Dinner: chicken, dirty rice  Meal Plan 24 Hour Recall - Snack: applesaude, grapes, strawberries  Monitoring   Self Monitoring : pt has a meter and is checking 2-3 times a day. instructed pt on the normal bs ranges. pt is getting a Rea meter today to assist with compliance in checking her bs's  Blood Glucose Logs: No  Do you use a personal continuous glucose monitor?: (applying Rea meter today)  In  the last month, how often have you had a low blood sugar reaction?: never  Can you tell when your blood sugar is too high?: no  Exercise   Exercise Type: none  Current Diabetes Treatment   Current Treatment: Oral Medication, Diet, Injectable, Exercise, Insulin  Social History  Preferred Learning Method: Face to Face, Demonstration, Reading Materials  Primary Support: Family  Educational Level: High School  Occupation: disabled  Smoking Status: Never a Smoker  Alcohol Use: Never  DDS-2 Score  ( > 3 = SIGNIFICANT DISTRESS): 2     Barriers to Change  Barriers to Change: None  Learning Challenges : None  Readiness to Learn   Readiness to Learn : Acceptance  Diabetes Education Assessment/Progress  Diabetes Disease Process (diabetes disease process and treatment options): Written Materials Provided, Discussion, Individual Session, Demonstrates Understanding/Competency(verbalizes/demonstrates), Instructed  Nutrition (Incorporating nutritional management into one's lifestyle): Demonstration, Written Materials Provided, Discussion, Individual Session, Demonstrates Understanding/Competency (verbalizes/demonstrates), Instructed  Physical Activity (incorporating physical activity into one's lifestyle): Written Materials Provided, Discussion, Individual Session, Demonstrates Understanding/Competency (verbalizes/demonstrates), Instructed  Medications (states correct name, dose, onset, peak, duration, side effects & timing of meds): Written Materials Provided, Discussion, Individual Session, Demonstrates Understanding/Competency(verbalizes/demonstrates), Instructed  Monitoring (monitoring blood glucose/other parameters & using results): Demonstration, Written Materials Provided, Individual Session, Demonstrates Understanding/Competency (verbalizes/demonstrates), Instructed  Acute Complications (preventing, detecting, and treating acute complications): Written Materials Provided, Discussion, Individual Session, Demonstrates  Understanding/Competency (verbalizes/demonstrates), Instructed  Chronic Complications (preventing, detecting, and treating chronic complications): Written Materials Provided, Discussion, Individual Session, Demonstrates Understanding/Competency (verbalizes/demonstrates), Instructed  Clinical (diabetes, other pertinent medical history, and relevant comorbidities reviewed during visit): Discussion, Individual Session, Demonstrates Understanding/Competency (verbalizes/demonstrates)  Cognitive (knowledge of self-management skills, functional health literacy): Discussion, Individual Session, Demonstrates Understanding/Competency (verbalizes/demonstrates)  Psychosocial (emotional response to diabetes): Discussion, Individual Session, Demonstrates Understanding/Competency (verbalizes/demonstrates), Instructed  Diabetes Distress and Support Systems: Discussion, Individual Session, Demonstrates Understanding/Competency (verbalizes/demonstrates), Instructed  Behavioral (readiness for change, lifestyle practices, self-care behaviors): Discussion, Individual Session, Demonstrates Understanding/Competency (verbalizes/demonstrates), Instructed  Goals  Patient has selected/evaluated goals during today's session: Yes, selected  Healthy Eating: Set  Start Date: 09/02/20  Target Date: 11/02/20(eat more balanced and 3 meals per day)  Monitoring: Set  Start Date: 09/02/20  Target Date: 11/02/20(check bs's 4 times a day)  Diabetes Meal Plan  Restrictions: Restricted Carbohydrate  Calories: 1800  Carbohydrate Per Meal: 30-45g  Carbohydrate Per Snack : 15-20g  Instructed pt on the food groups, how to read labels and count carbs. Pt was given sample menus and meal plans as examples. Pt usually skips one meal per day. pts family cooks for her and she has a good appetite. Discussed with pt the importance of eating balanced and portioned. Discussed with pt foods that she likes that she could include in her meal plan. Discussed with pt how to  put her meals together. Discussed with pt the portions for fruit. Discussed with pt what needs to be added to her meals to make them more balanced. Pt had good understanding of meal plan and will work on changes. Pts questions were addressed and answered. Pt was advised to call for any problems or questions.  Today's Self-Management Care Plan was developed with the patient's input and is based on barriers identified during today's assessment.    The long and short-term goals in the care plan were written with the patient/caregiver's input. The patient has agreed to work toward these goals to improve her overall diabetes control.      The patient received a copy of today's self-management plan and verbalized understanding of the care plan, goals, and all of today's instructions.      The patient was encouraged to communicate with her physician and care team regarding her condition(s) and treatment.  I provided the patient with my contact information today and encouraged her to contact me via phone or patient portal as needed.     Education Units of Time   Time Spent: 60 min   Diabetes Education  Author: Emir Toribio RN  Date: 9/3/2020    Diabetes Care Management Summary  Diabetes Education Record Assessment/Progress: Initial  Current Diabetes Risk Level: Moderate     Last A1c:   Lab Results   Component Value Date    HGBA1C 7.0 (H) 08/19/2020    HGBA1C 7.0 (H) 08/19/2020     Last visit with Diabetes Educator: : 09/02/2020      Diabetes Type  Diabetes Type : Type II    Diabetes History  Diabetes Diagnosis: >10 years  Current Treatment: Oral Medication, Diet, Injectable, Exercise, Insulin    Health Maintenance was reviewed today with patient. Discussed with patient importance of routine eye exams, foot exams/foot care, blood work (i.e.: A1c, microalbumin, and lipid), dental visits, yearly flu vaccine, and pneumonia vaccine as indicated by PCP. Patient verbalized understanding.     Health Maintenance Topics with due  status: Not Due       Topic Last Completion Date    TETANUS VACCINE 01/01/2019    Lipid Panel 08/19/2020    Hemoglobin A1c 08/19/2020     Health Maintenance Due   Topic Date Due    HIV Screening  08/24/1973    Shingles Vaccine (1 of 2) 08/24/2008    Influenza Vaccine (1) 08/01/2020    Foot Exam  09/17/2020    Eye Exam  09/24/2020    Mammogram  10/03/2020       Nutrition  Meal Planning: skipping meals, water, diet drinks, artificial sweeteners, snacks between meal  What type of sweetener do you use?: Splenda  What type of beverages do you drink?: diet soda/tea, water  Meal Plan 24 Hour Recall - Breakfast: nothing  Meal Plan 24 Hour Recall - Lunch: can't remember  Meal Plan 24 Hour Recall - Dinner: chicken, dirty rice  Meal Plan 24 Hour Recall - Snack: applesaude, grapes, strawberries    Monitoring   Self Monitoring : pt has a meter and is checking 2-3 times a day. instructed pt on the normal bs ranges. pt is getting a Rea meter today to assist with compliance in checking her bs's  Blood Glucose Logs: No  Do you use a personal continuous glucose monitor?: (applying Rea meter today)  In the last month, how often have you had a low blood sugar reaction?: never  Can you tell when your blood sugar is too high?: no    Exercise   Exercise Type: none    Current Diabetes Treatment   Current Treatment: Oral Medication, Diet, Injectable, Exercise, Insulin    Social History  Preferred Learning Method: Face to Face, Demonstration, Reading Materials  Primary Support: Family  Educational Level: High School  Occupation: disabled  Smoking Status: Never a Smoker  Alcohol Use: Never            DDS-2 Score  ( > 3 = SIGNIFICANT DISTRESS): 2                   Barriers to Change  Barriers to Change: None  Learning Challenges : None    Readiness to Learn   Readiness to Learn : Acceptance         Diabetes Education Assessment/Progress  Diabetes Disease Process (diabetes disease process and treatment options): Written Materials  Provided, Discussion, Individual Session, Demonstrates Understanding/Competency(verbalizes/demonstrates), Instructed  Nutrition (Incorporating nutritional management into one's lifestyle): Demonstration, Written Materials Provided, Discussion, Individual Session, Demonstrates Understanding/Competency (verbalizes/demonstrates), Instructed  Physical Activity (incorporating physical activity into one's lifestyle): Written Materials Provided, Discussion, Individual Session, Demonstrates Understanding/Competency (verbalizes/demonstrates), Instructed  Medications (states correct name, dose, onset, peak, duration, side effects & timing of meds): Written Materials Provided, Discussion, Individual Session, Demonstrates Understanding/Competency(verbalizes/demonstrates), Instructed  Monitoring (monitoring blood glucose/other parameters & using results): Demonstration, Written Materials Provided, Individual Session, Demonstrates Understanding/Competency (verbalizes/demonstrates), Instructed  Acute Complications (preventing, detecting, and treating acute complications): Written Materials Provided, Discussion, Individual Session, Demonstrates Understanding/Competency (verbalizes/demonstrates), Instructed  Chronic Complications (preventing, detecting, and treating chronic complications): Written Materials Provided, Discussion, Individual Session, Demonstrates Understanding/Competency (verbalizes/demonstrates), Instructed  Clinical (diabetes, other pertinent medical history, and relevant comorbidities reviewed during visit): Discussion, Individual Session, Demonstrates Understanding/Competency (verbalizes/demonstrates)  Cognitive (knowledge of self-management skills, functional health literacy): Discussion, Individual Session, Demonstrates Understanding/Competency (verbalizes/demonstrates)  Psychosocial (emotional response to diabetes): Discussion, Individual Session, Demonstrates Understanding/Competency (verbalizes/demonstrates),  Instructed  Diabetes Distress and Support Systems: Discussion, Individual Session, Demonstrates Understanding/Competency (verbalizes/demonstrates), Instructed  Behavioral (readiness for change, lifestyle practices, self-care behaviors): Discussion, Individual Session, Demonstrates Understanding/Competency (verbalizes/demonstrates), Instructed    Goals  Patient has selected/evaluated goals during today's session: Yes, selected  Healthy Eating: Set  Start Date: 09/02/20  Target Date: 11/02/20(eat more balanced and 3 meals per day)  Monitoring: Set  Start Date: 09/02/20  Target Date: 11/02/20(check bs's 4 times a day)              Diabetes Meal Plan  Restrictions: Restricted Carbohydrate  Calories: 1800  Carbohydrate Per Meal: 30-45g  Carbohydrate Per Snack : 15-20g    Today's Self-Management Care Plan was developed with the patient's input and is based on barriers identified during today's assessment.    The long and short-term goals in the care plan were written with the patient/caregiver's input. The patient has agreed to work toward these goals to improve her overall diabetes control.      The patient received a copy of today's self-management plan and verbalized understanding of the care plan, goals, and all of today's instructions.      The patient was encouraged to communicate with her physician and care team regarding her condition(s) and treatment.  I provided the patient with my contact information today and encouraged her to contact me via phone or patient portal as needed.     Education Units of Time   Time Spent: 60 min

## 2020-09-03 NOTE — PROGRESS NOTES
Diabetes Education  Author: Emir Toribio RN  Date: 9/3/2020    Diabetes Care Management Summary  Diabetes Education Record Assessment/Progress: Initial  Current Diabetes Risk Level: Moderate     Last A1c:   Lab Results   Component Value Date    HGBA1C 7.0 (H) 08/19/2020    HGBA1C 7.0 (H) 08/19/2020     Last visit with Diabetes Educator: : 09/02/2020      Diabetes Type  Diabetes Type : Type II    Diabetes History  Diabetes Diagnosis: >10 years  Current Treatment: Oral Medication, Diet, Injectable, Exercise, Insulin    Health Maintenance was reviewed today with patient. Discussed with patient importance of routine eye exams, foot exams/foot care, blood work (i.e.: A1c, microalbumin, and lipid), dental visits, yearly flu vaccine, and pneumonia vaccine as indicated by PCP. Patient verbalized understanding.     Health Maintenance Topics with due status: Not Due       Topic Last Completion Date    TETANUS VACCINE 01/01/2019    Lipid Panel 08/19/2020    Hemoglobin A1c 08/19/2020     Health Maintenance Due   Topic Date Due    HIV Screening  08/24/1973    Shingles Vaccine (1 of 2) 08/24/2008    Influenza Vaccine (1) 08/01/2020    Foot Exam  09/17/2020    Eye Exam  09/24/2020    Mammogram  10/03/2020       Nutrition  Meal Planning: skipping meals, water, diet drinks, artificial sweeteners, snacks between meal  What type of sweetener do you use?: Splenda  What type of beverages do you drink?: diet soda/tea, water  Meal Plan 24 Hour Recall - Breakfast: nothing  Meal Plan 24 Hour Recall - Lunch: can't remember  Meal Plan 24 Hour Recall - Dinner: chicken, dirty rice  Meal Plan 24 Hour Recall - Snack: applesaude, grapes, strawberries    Monitoring   Self Monitoring : pt has a meter and is checking 2-3 times a day. instructed pt on the normal bs ranges. pt is getting a Rea meter today to assist with compliance in checking her bs's  Blood Glucose Logs: No  Do you use a personal continuous glucose monitor?: (applying  Rea meter today)  In the last month, how often have you had a low blood sugar reaction?: never  Can you tell when your blood sugar is too high?: no    Exercise   Exercise Type: none    Current Diabetes Treatment   Current Treatment: Oral Medication, Diet, Injectable, Exercise, Insulin    Social History  Preferred Learning Method: Face to Face, Demonstration, Reading Materials  Primary Support: Family  Educational Level: High School  Occupation: disabled  Smoking Status: Never a Smoker  Alcohol Use: Never            DDS-2 Score  ( > 3 = SIGNIFICANT DISTRESS): 2                   Barriers to Change  Barriers to Change: None  Learning Challenges : None    Readiness to Learn   Readiness to Learn : Acceptance         Diabetes Education Assessment/Progress  Diabetes Disease Process (diabetes disease process and treatment options): Written Materials Provided, Discussion, Individual Session, Demonstrates Understanding/Competency(verbalizes/demonstrates), Instructed  Nutrition (Incorporating nutritional management into one's lifestyle): Demonstration, Written Materials Provided, Discussion, Individual Session, Demonstrates Understanding/Competency (verbalizes/demonstrates), Instructed  Physical Activity (incorporating physical activity into one's lifestyle): Written Materials Provided, Discussion, Individual Session, Demonstrates Understanding/Competency (verbalizes/demonstrates), Instructed  Medications (states correct name, dose, onset, peak, duration, side effects & timing of meds): Written Materials Provided, Discussion, Individual Session, Demonstrates Understanding/Competency(verbalizes/demonstrates), Instructed  Monitoring (monitoring blood glucose/other parameters & using results): Demonstration, Written Materials Provided, Individual Session, Demonstrates Understanding/Competency (verbalizes/demonstrates), Instructed  Acute Complications (preventing, detecting, and treating acute complications): Written Materials  Provided, Discussion, Individual Session, Demonstrates Understanding/Competency (verbalizes/demonstrates), Instructed  Chronic Complications (preventing, detecting, and treating chronic complications): Written Materials Provided, Discussion, Individual Session, Demonstrates Understanding/Competency (verbalizes/demonstrates), Instructed  Clinical (diabetes, other pertinent medical history, and relevant comorbidities reviewed during visit): Discussion, Individual Session, Demonstrates Understanding/Competency (verbalizes/demonstrates)  Cognitive (knowledge of self-management skills, functional health literacy): Discussion, Individual Session, Demonstrates Understanding/Competency (verbalizes/demonstrates)  Psychosocial (emotional response to diabetes): Discussion, Individual Session, Demonstrates Understanding/Competency (verbalizes/demonstrates), Instructed  Diabetes Distress and Support Systems: Discussion, Individual Session, Demonstrates Understanding/Competency (verbalizes/demonstrates), Instructed  Behavioral (readiness for change, lifestyle practices, self-care behaviors): Discussion, Individual Session, Demonstrates Understanding/Competency (verbalizes/demonstrates), Instructed    Goals  Patient has selected/evaluated goals during today's session: Yes, selected  Healthy Eating: Set  Start Date: 09/02/20  Target Date: 11/02/20(eat more balanced and 3 meals per day)  Monitoring: Set  Start Date: 09/02/20  Target Date: 11/02/20(check bs's 4 times a day)              Diabetes Meal Plan  Restrictions: Restricted Carbohydrate  Calories: 1800  Carbohydrate Per Meal: 30-45g  Carbohydrate Per Snack : 15-20g    Today's Self-Management Care Plan was developed with the patient's input and is based on barriers identified during today's assessment.    The long and short-term goals in the care plan were written with the patient/caregiver's input. The patient has agreed to work toward these goals to improve her overall  diabetes control.      The patient received a copy of today's self-management plan and verbalized understanding of the care plan, goals, and all of today's instructions.      The patient was encouraged to communicate with her physician and care team regarding her condition(s) and treatment.  I provided the patient with my contact information today and encouraged her to contact me via phone or patient portal as needed.     Education Units of Time   Time Spent: 60 min

## 2020-09-03 NOTE — PROGRESS NOTES
DIABETES EDUCATION  FREESTYLE JOSÉ LUIS CGM TRAINING     Patient referred to clinic today for Freestyle José Luis continuous glucose sensor system training. Patient arrived with their reader charged and 1 sensor for application. Provided personal FreeStyle José Luis training - reviewed the following with patient:     · NO fingersticks required but if feeling s/s that do not match with CGM reading or in event of hypoglycemia confirm with fingerstick   · Salicylic acid and ascorbic acid can cause false readings  · Sensor is 14-day wear-- Sensor should be changed out [id number]. Rotating senor placement sites with back of each arm.  · FDA approved for back of the arm wear  · Site rotation  · Once sensor is placed- wait 60 minutes- warm up period before able to scan  · During the first 12-hour after placing a new sensor - patient should perform fingerstick   · Scan minimum every 8 hours for continual graph. Encouraged pt to scan more often - before each meal and before bed.   · If you have an Xray, MRI, a CT scan, or a diathermy treatment, you must remove your sensor prior to the procedure. If this occurs, notify your healthcare provider.  · [place] X-ray machine- ask to be wanded  · The sensor is water-resistant and can be worn while bathing, showering, or swimming as long as you do not take it deeper than 3 feet or keep it underwater for more than 30 minutes at a time.  · [place] will not sound any alarms     . Reviewed additional adhesive options if having any difficulty with sensor staying on. Target BG range set for  mg/dL.     Instructed on setup of [email or web address] account.    Time spent with patient:60 minutesDiabetes Education  Author: Emir Toribio RN  Date: 9/3/2020    Diabetes Care Management Summary  Diabetes Education Record Assessment/Progress: Initial  Current Diabetes Risk Level: Moderate     Last A1c:   Lab Results   Component Value Date    HGBA1C 7.0 (H) 08/19/2020    HGBA1C 7.0 (H) 08/19/2020     Last  visit with Diabetes Educator: : 09/02/2020      Diabetes Type  Diabetes Type : Type II    Diabetes History  Diabetes Diagnosis: >10 years  Current Treatment: Oral Medication, Diet, Injectable, Exercise, Insulin    Health Maintenance was reviewed today with patient. Discussed with patient importance of routine eye exams, foot exams/foot care, blood work (i.e.: A1c, microalbumin, and lipid), dental visits, yearly flu vaccine, and pneumonia vaccine as indicated by PCP. Patient verbalized understanding.     Health Maintenance Topics with due status: Not Due       Topic Last Completion Date    TETANUS VACCINE 01/01/2019    Lipid Panel 08/19/2020    Hemoglobin A1c 08/19/2020     Health Maintenance Due   Topic Date Due    HIV Screening  08/24/1973    Shingles Vaccine (1 of 2) 08/24/2008    Influenza Vaccine (1) 08/01/2020    Foot Exam  09/17/2020    Eye Exam  09/24/2020    Mammogram  10/03/2020       Nutrition  Meal Planning: skipping meals, water, diet drinks, artificial sweeteners, snacks between meal  What type of sweetener do you use?: Splenda  What type of beverages do you drink?: diet soda/tea, water  Meal Plan 24 Hour Recall - Breakfast: nothing  Meal Plan 24 Hour Recall - Lunch: can't remember  Meal Plan 24 Hour Recall - Dinner: chicken, dirty rice  Meal Plan 24 Hour Recall - Snack: applesaude, grapes, strawberries    Monitoring   Self Monitoring : pt has a meter and is checking 2-3 times a day. instructed pt on the normal bs ranges. pt is getting a Rea meter today to assist with compliance in checking her bs's  Blood Glucose Logs: No  Do you use a personal continuous glucose monitor?: (applying Rea meter today)  In the last month, how often have you had a low blood sugar reaction?: never  Can you tell when your blood sugar is too high?: no    Exercise   Exercise Type: none    Current Diabetes Treatment   Current Treatment: Oral Medication, Diet, Injectable, Exercise, Insulin    Social History  Preferred  Learning Method: Face to Face, Demonstration, Reading Materials  Primary Support: Family  Educational Level: High School  Occupation: disabled  Smoking Status: Never a Smoker  Alcohol Use: Never            DDS-2 Score  ( > 3 = SIGNIFICANT DISTRESS): 2                   Barriers to Change  Barriers to Change: None  Learning Challenges : None    Readiness to Learn   Readiness to Learn : Acceptance         Diabetes Education Assessment/Progress  Diabetes Disease Process (diabetes disease process and treatment options): Written Materials Provided, Discussion, Individual Session, Demonstrates Understanding/Competency(verbalizes/demonstrates), Instructed  Nutrition (Incorporating nutritional management into one's lifestyle): Demonstration, Written Materials Provided, Discussion, Individual Session, Demonstrates Understanding/Competency (verbalizes/demonstrates), Instructed  Physical Activity (incorporating physical activity into one's lifestyle): Written Materials Provided, Discussion, Individual Session, Demonstrates Understanding/Competency (verbalizes/demonstrates), Instructed  Medications (states correct name, dose, onset, peak, duration, side effects & timing of meds): Written Materials Provided, Discussion, Individual Session, Demonstrates Understanding/Competency(verbalizes/demonstrates), Instructed  Monitoring (monitoring blood glucose/other parameters & using results): Demonstration, Written Materials Provided, Individual Session, Demonstrates Understanding/Competency (verbalizes/demonstrates), Instructed  Acute Complications (preventing, detecting, and treating acute complications): Written Materials Provided, Discussion, Individual Session, Demonstrates Understanding/Competency (verbalizes/demonstrates), Instructed  Chronic Complications (preventing, detecting, and treating chronic complications): Written Materials Provided, Discussion, Individual Session, Demonstrates Understanding/Competency  (verbalizes/demonstrates), Instructed  Clinical (diabetes, other pertinent medical history, and relevant comorbidities reviewed during visit): Discussion, Individual Session, Demonstrates Understanding/Competency (verbalizes/demonstrates)  Cognitive (knowledge of self-management skills, functional health literacy): Discussion, Individual Session, Demonstrates Understanding/Competency (verbalizes/demonstrates)  Psychosocial (emotional response to diabetes): Discussion, Individual Session, Demonstrates Understanding/Competency (verbalizes/demonstrates), Instructed  Diabetes Distress and Support Systems: Discussion, Individual Session, Demonstrates Understanding/Competency (verbalizes/demonstrates), Instructed  Behavioral (readiness for change, lifestyle practices, self-care behaviors): Discussion, Individual Session, Demonstrates Understanding/Competency (verbalizes/demonstrates), Instructed    Goals  Patient has selected/evaluated goals during today's session: Yes, selected  Healthy Eating: Set  Start Date: 09/02/20  Target Date: 11/02/20(eat more balanced and 3 meals per day)  Monitoring: Set  Start Date: 09/02/20  Target Date: 11/02/20(check bs's 4 times a day)              Diabetes Meal Plan  Restrictions: Restricted Carbohydrate  Calories: 1800  Carbohydrate Per Meal: 30-45g  Carbohydrate Per Snack : 15-20g    Today's Self-Management Care Plan was developed with the patient's input and is based on barriers identified during today's assessment.    The long and short-term goals in the care plan were written with the patient/caregiver's input. The patient has agreed to work toward these goals to improve her overall diabetes control.      The patient received a copy of today's self-management plan and verbalized understanding of the care plan, goals, and all of today's instructions.      The patient was encouraged to communicate with her physician and care team regarding her condition(s) and treatment.  I provided  the patient with my contact information today and encouraged her to contact me via phone or patient portal as needed.     Education Units of Time   Time Spent: 60 min

## 2020-09-18 ENCOUNTER — TELEPHONE (OUTPATIENT)
Dept: INTERNAL MEDICINE | Facility: CLINIC | Age: 60
End: 2020-09-18

## 2020-09-18 DIAGNOSIS — E78.00 PURE HYPERCHOLESTEROLEMIA: ICD-10-CM

## 2020-09-18 NOTE — TELEPHONE ENCOUNTER
----- Message from Carmita S Yash sent at 9/18/2020 10:07 AM CDT -----  Regarding: Pts  Mr. Hdz Pts  Mobile [phone]  Diabetic or Medical Supplies.  What supplies are needed: Freestyle Rea  What is the brand name of the supplies: Freestyle Libree  Is this a refill or new prescription:  Refill  Who prescribed the supplies:  Dr. Beverly Luciano John   Pharmacy or company name, phone # and location:  [facility] [address] [place] [address] [address] [facility]  [phone] Fax# [phone]   Comments:  One [employer]      Requesting an RX refill or new RX.  Is this a refill or new RX:  Refill  RX name and strength: atorvastatin (LIPITOR) 40 MG tablet  Directions (copy/paste from chart):  N/A  Is this a 30 day or 90 day RX:  30  Local pharmacy or mail order pharmacy:  [address] [address] [place] [address]  Pharmacy name and phone # [phone] Fax# [phone]

## 2020-09-21 ENCOUNTER — TELEPHONE (OUTPATIENT)
Dept: INTERNAL MEDICINE | Facility: CLINIC | Age: 60
End: 2020-09-21

## 2020-09-21 RX ORDER — ATORVASTATIN CALCIUM 40 MG/1
TABLET, FILM COATED ORAL
Qty: 90 TABLET | Refills: 2 | Status: SHIPPED | OUTPATIENT
Start: 2020-09-21

## 2020-09-21 RX ORDER — INSULIN PUMP SYRINGE, 3 ML
EACH MISCELLANEOUS
Qty: 1 EACH | Refills: 1 | OUTPATIENT
Start: 2020-09-21 | End: 2021-09-18

## 2020-09-21 NOTE — TELEPHONE ENCOUNTER
Bogdan Watson  Mrs Ayala requested a prescription for her Free Style Rea  and supplies be sent to [facility].  I know her insurance won't approve it for me; would you please send these into her [id number]?  Thanks/Beverly Leigh

## 2020-09-21 NOTE — TELEPHONE ENCOUNTER
Pt added to schedule on 12/10 as requested. Attempted to contact patient, no answer, left voicemail.

## 2020-09-21 NOTE — TELEPHONE ENCOUNTER
Spoke with Mrs Ayala and her , Widler.'Have reviewed Lab(8/19) as pt missed 2 scheduled Appts: showed HgbA1C good at 7.0%,H/H stable at 10/34%,rest all looked good.  She is doing much better with peritoneal dialysis and no recent episodes of fluid overload.  Tonja, can you schedule pt for a 1 or 1:30PM appt in December to see me back. I can see her 12/10 at 1PM on my Admin Day as my one patient.Thanks so much

## 2020-09-22 ENCOUNTER — TELEPHONE (OUTPATIENT)
Dept: VASCULAR SURGERY | Facility: CLINIC | Age: 60
End: 2020-09-22

## 2020-09-22 NOTE — TELEPHONE ENCOUNTER
----- Message from Magaly Ramos sent at 9/22/2020  9:55 AM CDT -----  No sounds from fistula and very difficult to draw blood could be clotted need appt ASAP  First available for me was 10/13    Please call patient to schedule as soon as possible @# [phone]

## 2020-09-22 NOTE — TELEPHONE ENCOUNTER
Spoke to the pt , It was hard to understand until the pt explained the situation. Pt does peritoneal dialysis at home and go to her local dialysis unit to get labs. Pt get blood drawn thru her fistula monthly. When getting labs done pt stated having a faint trill and the HD access was poss. Clotted. Per the pt she only use it to get blood drawn . appt was scheduled

## 2020-09-30 ENCOUNTER — HOSPITAL ENCOUNTER (OUTPATIENT)
Dept: VASCULAR SURGERY | Facility: CLINIC | Age: 60
Discharge: HOME | End: 2020-09-30
Attending: STUDENT IN AN ORGANIZED HEALTH CARE EDUCATION/TRAINING PROGRAM
Payer: MEDICARE

## 2020-09-30 DIAGNOSIS — T82.898A PROBLEM WITH DIALYSIS ACCESS, INITIAL ENCOUNTER: ICD-10-CM

## 2020-09-30 DIAGNOSIS — N18.6 ESRD (END STAGE RENAL DISEASE) ON DIALYSIS: ICD-10-CM

## 2020-09-30 DIAGNOSIS — Z99.2 ESRD (END STAGE RENAL DISEASE) ON DIALYSIS: ICD-10-CM

## 2020-09-30 PROCEDURE — 93990 PR DUPLEX HEMODIALYSIS ACCESS: ICD-10-PCS | Mod: HCNC,S$GLB,, | Performed by: SURGERY

## 2020-09-30 PROCEDURE — 93990 DOPPLER FLOW TESTING: CPT | Mod: HCNC,S$GLB,, | Performed by: SURGERY

## 2020-10-08 ENCOUNTER — PATIENT OUTREACH (OUTPATIENT)
Dept: ADMINISTRATIVE | Facility: OTHER | Age: 60
End: 2020-10-08

## 2020-10-08 NOTE — PROGRESS NOTES
Care Everywhere: updated  Immunization: updated  Health Maintenance: updated  Media Review: review for outside eye exam and mammogram report   Legacy Review:   Order placed:   Upcoming appts:  Mammogram scheduling ticket sent to patient's portal on 9/1/2020

## 2020-10-15 NOTE — ASSESSMENT & PLAN NOTE
Cardiology Office Visit      Encounter Date:  10/15/2020    Patient ID:   Uday Jay is a 54 y.o. male.    Reason For Followup:  Coronary artery disease    Brief Clinical History:  Dear Jeana Robert APRN    I had the pleasure of seeing Uday Jay today. As you are well aware, this is a 54 y.o. male with a history of ischemic heart disease.  He is undergone four-vessel coronary arterial bypass grafting in November 2004.  He has additional history that includes hypertension, hypertensive cardiovascular disease, hyperlipidemia, diabetes mellitus, obstructive sleep apnea, chronic back pain, palpitations, and antiplatelet therapy.  He presents today for follow-up on the above conditions.    Interval History:  He denies any chest pain pressure heaviness or tightness.  He does report shortness of breath with activity however this is unchanged from previous evaluations.  He denies any PND orthopnea.  He denies any syncope or near syncope.    As you are well aware he had an echocardiogram performed that demonstrated a markedly impaired LV systolic function.  Interestingly following this he had a MUGA scan that demonstrated normal LV systolic function.  He is sent to us for evaluation of these incongruent results.  He reports that he had a stress test several years ago.  He reports his stress tests are always abnormal and resultant cardiac catheterizations.  His last catheterization was about 5 or 6 years ago.  We discussed options and he would prefer to move forward with cardiac catheterization for definitive assessment of his coronary vasculature.    He is currently wearing a LifeVest.  We have encouraged compliance until were able to perform the catheterization.  Additionally he was started on Entresto.  Although he does have diabetes, if his cardiac catheterization demonstrates durable LV systolic function, we should consider transitioning him to a lower dose ACE/angiotensin receptor blocker  Home regimen: lantus 10 units qhs  Recent HgbA1c 7.5%  Hospital regimen: Lantus 8 units with low dose correction scale  Hyper/hypoglycemia protocols in place  Renal Diabetic Diet       "because of his orthostasis and cost.    Assessment & Plan    Impressions:  Coronary artery disease status post four-vessel CABG November 2004  Possible cardiomyopathy with incongruent noninvasive cardiac test results     Severe LV systolic dysfunction on Echo 9/2020     Normal LV systolic function on MUGA 10/2020  Wearable cardiac defibrillator  Hypertension  Hypertensive cardiovascular disease  Hyperlipidemia  Diabetes mellitus  Palpitations  Obstructive sleep apnea  Chronic pain  Antiplatelet therapy    Recommendations:  Continuation of his current cardiovascular regimen at the present time.  Risk benefits and options discussed.  We will proceed with cardiac catheterization  Follow-up in 4 weeks    Objective:    Vitals:  Vitals:    10/15/20 0922   BP: 121/76   BP Location: Left arm   Patient Position: Sitting   Cuff Size: Large Adult   Pulse: 71   Resp: 18   SpO2: 97%   Weight: 127 kg (279 lb)   Height: 177.8 cm (70\")       Physical Exam:    General: Alert, cooperative, no distress, appears stated age  Head:  Normocephalic, atraumatic, mucous membranes moist  Eyes:  Conjunctiva/corneas clear, EOM's intact     Neck:  Supple,  no bruit  Lungs: Clear to auscultation bilaterally, no wheezes rhonchi rales are noted  Chest wall: No tenderness  Heart::  Regular rate and rhythm, S1 and S2 normal, 1/6 holosystolic murmur.  No rub or gallop  Abdomen: Soft, non-tender, nondistended bowel sounds active.  Obese  Extremities: No cyanosis, clubbing, or edema  Pulses: 2+ and symmetric all extremities  Skin:  No rashes or lesions  Neuro/psych: A&O x3. CN II through XII are grossly intact with appropriate affect      Allergies:  No Known Allergies    Medication Review:     Current Outpatient Medications:   •  Alogliptin Benzoate 25 MG tablet, , Disp: , Rfl:   •  aspirin 81 MG EC tablet, Take 81 mg by mouth Daily. Last dose 10/25/19, Disp: , Rfl:   •  atorvastatin (LIPITOR) 80 MG tablet, Take 80 mg by mouth Daily., Disp: , Rfl:   •  " "BD Insulin Syringe U/F 31G X 5/16\" 1 ML misc, , Disp: , Rfl:   •  carvedilol (COREG) 12.5 MG tablet, Take 12.5 mg by mouth 2 (Two) Times a Day., Disp: , Rfl: 3  •  clopidogrel (PLAVIX) 75 MG tablet, Take 75 mg by mouth Daily. Last dose 10/25/19, Disp: , Rfl:   •  Empagliflozin (JARDIANCE) 10 MG tablet, Take 10 mg by mouth Daily., Disp: , Rfl:   •  escitalopram (LEXAPRO) 10 MG tablet, Take 10 mg by mouth Daily., Disp: , Rfl: 2  •  Evolocumab (REPATHA) 140 MG/ML solution prefilled syringe, Inject 1 mL under the skin into the appropriate area as directed Daily., Disp: , Rfl:   •  insulin glargine (LANTUS) 100 UNIT/ML injection, Inject 80 Units under the skin into the appropriate area as directed Daily., Disp: , Rfl:   •  isosorbide mononitrate (IMDUR) 60 MG 24 hr tablet, Take 60 mg by mouth Daily., Disp: , Rfl: 3  •  lidocaine (LIDODERM) 5 %, APPLY 1 PATCH REMOVE AFTER 12 HOURS ONCE A DAY EXTERNALLY 30 DAYS, Disp: , Rfl:   •  metFORMIN (GLUCOPHAGE) 1000 MG tablet, Take 1,000 mg by mouth 2 (Two) Times a Day., Disp: , Rfl: 1  •  neomycin-polymyxin-hydrocortisone (CORTISPORIN) 3.5-20348-1 otic suspension, INSTILL 4 DROPS INTO THE AFFECTED EAR 3 TIMES DAILY FOR 7 DAYS, Disp: , Rfl:   •  Ozempic, 1 MG/DOSE, 2 MG/1.5ML solution pen-injector, , Disp: , Rfl:   •  pantoprazole (PROTONIX) 40 MG EC tablet, Take 40 mg by mouth Daily., Disp: , Rfl:   •  ranolazine (Ranexa) 500 MG 12 hr tablet, Take 1 tablet by mouth Every 12 (Twelve) Hours., Disp: , Rfl:   •  sacubitril-valsartan (Entresto) 24-26 MG tablet, Take 1 tablet by mouth Daily., Disp: , Rfl:   •  tamsulosin (FLOMAX) 0.4 MG capsule 24 hr capsule, TAKE 1 CAPSULE BY MOUTH ONCE DAILY AT NIGHT AT BEDTIME, Disp: , Rfl:   •  traMADol (ULTRAM) 50 MG tablet, Take 1 tablet by mouth Every 6 (Six) Hours As Needed for Moderate Pain ., Disp: 90 tablet, Rfl: 0    Family History:  Family History   Problem Relation Age of Onset   • Heart disease Mother        Past Medical History:  Past " Medical History:   Diagnosis Date   • Coronary artery disease    • Diabetes (CMS/HCC)    • GERD (gastroesophageal reflux disease)    • Hyperlipidemia    • Hypertension    • Low back pain    • Sleep apnea        Past surgical History:  Past Surgical History:   Procedure Laterality Date   • CARDIAC CATHETERIZATION     • CARDIAC SURGERY     • CAROTID STENT     • CORONARY ARTERY BYPASS GRAFT     • KNEE ARTHROPLASTY, PARTIAL REPLACEMENT     • PENILE PROSTHESIS IMPLANT     • SPINAL CORD STIMULATOR IMPLANT     • SPINAL CORD STIMULATOR IMPLANT N/A 2019    Procedure: T10 laminectomy and placement of spinal cord stimulator;  Surgeon: Gianluca Miles MD;  Location: [facility] OR;  Service: Neurosurgery       Social History:  Social History     Socioeconomic History   • Marital status:      Spouse name: Not on file   • Number of children: Not on file   • Years of education: Not on file   • Highest education level: Not on file   Tobacco Use   • Smoking status: Former Smoker     Types: Cigarettes     Quit date:      Years since quittin.7   • Smokeless tobacco: Never Used   Substance and Sexual Activity   • Alcohol use: Yes     Frequency: Never     Comment: occ   • Drug use: No   • Sexual activity: Defer       Review of Systems:  The following systems were reviewed as they relate to the cardiovascular system: Constitutional, Eyes, ENT, Cardiovascular, Respiratory, Gastrointestinal, Integumentary, Neurological, Psychiatric, Hematologic, Endocrine, Musculoskeletal, and Genitourinary. The pertinent cardiovascular findings are reported above with all other cardiovascular points within those systems being negative.    Diagnostic Study Review:     Current Electrocardiogram:    ECG 12 Lead    Date/Time: 10/15/2020 10:00 AM  Performed by: Adolfo Vogel DO  Authorized by: Adolfo Vogel DO   Comparison: not compared with previous ECG   Previous ECG: no previous ECG  available  Comments: Normal sinus rhythm with a ventricular rate of 74 bpm.  Nonspecific interventricular conduction delay.  Normal QT and QTc intervals.  Normal QRS axis.              NOTE: The following portions of the patient's history were reviewed and updated this visit as appropriate: allergies, current medications, past family history, past medical history, past social history, past surgical history and problem list.

## 2020-11-14 ENCOUNTER — OFFICE VISIT (OUTPATIENT)
Dept: URGENT CARE | Facility: CLINIC | Age: 60
End: 2020-11-14
Payer: MEDICARE

## 2020-11-14 VITALS
RESPIRATION RATE: 20 BRPM | TEMPERATURE: 99 F | OXYGEN SATURATION: 96 % | SYSTOLIC BLOOD PRESSURE: 140 MMHG | BODY MASS INDEX: 37.39 KG/M2 | DIASTOLIC BLOOD PRESSURE: 75 MMHG | HEIGHT: 63 IN | WEIGHT: 211 LBS | HEART RATE: 95 BPM

## 2020-11-14 VITALS
HEART RATE: 95 BPM | BODY MASS INDEX: 37.38 KG/M2 | OXYGEN SATURATION: 97 % | RESPIRATION RATE: 20 BRPM | DIASTOLIC BLOOD PRESSURE: 70 MMHG | TEMPERATURE: 99 F | SYSTOLIC BLOOD PRESSURE: 145 MMHG | WEIGHT: 211 LBS

## 2020-11-14 DIAGNOSIS — J40 BRONCHITIS: ICD-10-CM

## 2020-11-14 DIAGNOSIS — R05.9 COUGH: Primary | ICD-10-CM

## 2020-11-14 LAB
CTP QC/QA: YES
SARS-COV-2 RDRP RESP QL NAA+PROBE: NEGATIVE

## 2020-11-14 PROCEDURE — 3008F PR BODY MASS INDEX (BMI) DOCUMENTED: ICD-10-PCS | Mod: CPTII,S$GLB,, | Performed by: FAMILY MEDICINE

## 2020-11-14 PROCEDURE — 71045 XR CHEST 1 VIEW: ICD-10-PCS | Mod: S$GLB,,, | Performed by: RADIOLOGY

## 2020-11-14 PROCEDURE — 3008F BODY MASS INDEX DOCD: CPT | Mod: CPTII,S$GLB,, | Performed by: FAMILY MEDICINE

## 2020-11-14 PROCEDURE — 99214 OFFICE O/P EST MOD 30 MIN: CPT | Mod: S$GLB,,, | Performed by: FAMILY MEDICINE

## 2020-11-14 PROCEDURE — 71045 X-RAY EXAM CHEST 1 VIEW: CPT | Mod: S$GLB,,, | Performed by: RADIOLOGY

## 2020-11-14 PROCEDURE — U0002: ICD-10-PCS | Mod: QW,S$GLB,, | Performed by: FAMILY MEDICINE

## 2020-11-14 PROCEDURE — U0002 COVID-19 LAB TEST NON-CDC: HCPCS | Mod: QW,S$GLB,, | Performed by: FAMILY MEDICINE

## 2020-11-14 PROCEDURE — 99214 PR OFFICE/OUTPT VISIT, EST, LEVL IV, 30-39 MIN: ICD-10-PCS | Mod: S$GLB,,, | Performed by: FAMILY MEDICINE

## 2020-11-14 RX ORDER — ALBUTEROL SULFATE 90 UG/1
2 AEROSOL, METERED RESPIRATORY (INHALATION) EVERY 6 HOURS PRN
Qty: 18 G | Refills: 0 | Status: SHIPPED | OUTPATIENT
Start: 2020-11-14

## 2020-11-14 RX ORDER — CALCITRIOL 0.5 UG/1
CAPSULE ORAL
COMMUNITY
Start: 2020-07-19

## 2020-11-14 RX ORDER — PROMETHAZINE HYDROCHLORIDE AND DEXTROMETHORPHAN HYDROBROMIDE 6.25; 15 MG/5ML; MG/5ML
5 SYRUP ORAL 3 TIMES DAILY PRN
Qty: 180 ML | Refills: 0 | Status: SHIPPED | OUTPATIENT
Start: 2020-11-14 | End: 2020-11-24

## 2020-11-14 RX ORDER — DOXYCYCLINE HYCLATE 100 MG
100 TABLET ORAL 2 TIMES DAILY
Qty: 14 TABLET | Refills: 0 | Status: SHIPPED | OUTPATIENT
Start: 2020-11-14 | End: 2020-11-21

## 2020-11-14 NOTE — PROGRESS NOTES
"Patient refuses COVID swab decided that she did not want to be evaluated or be seen in more.  She wants to leave and refund this appointment.    Erroneous encounter placed for this visit.            Subjective:       Patient ID: Lucy Perez is a 60 y.o. female.    Vitals:  height is 5' 3" (1.6 m) and weight is 95.7 kg (211 lb). Her temperature is 98.9 °F (37.2 °C).     Chief Complaint: Cough    PT REFUSE COVID SWAB    Cough  This is a new problem. The current episode started yesterday. The problem has been gradually worsening. The cough is productive of sputum. Pertinent negatives include no chest pain, chills, fever, headaches, myalgias, rash, sore throat or shortness of breath. Nothing aggravates the symptoms. She has tried nothing for the symptoms. Her past medical history is significant for bronchitis and pneumonia.       Constitution: Negative for chills, fatigue and fever.   HENT: Negative for congestion and sore throat.    Neck: Negative for painful lymph nodes.   Cardiovascular: Negative for chest pain and leg swelling.   Eyes: Negative for double vision and blurred vision.   Respiratory: Positive for chest tightness, cough and sputum production. Negative for shortness of breath.    Gastrointestinal: Negative for nausea, vomiting and diarrhea.   Genitourinary: Negative for dysuria, frequency, urgency and history of kidney stones.   Musculoskeletal: Negative for joint pain, joint swelling, muscle cramps and muscle ache.   Skin: Negative for color change, pale, rash and bruising.   Allergic/Immunologic: Negative for seasonal allergies.   Neurological: Negative for dizziness, history of vertigo, light-headedness, passing out and headaches.   Hematologic/Lymphatic: Negative for swollen lymph nodes.   Psychiatric/Behavioral: Negative for nervous/anxious, sleep disturbance and depression. The patient is not nervous/anxious.        Objective:      Physical Exam      Assessment:       No diagnosis " found.    Plan:         There are no diagnoses linked to this encounter.

## 2020-11-14 NOTE — PROGRESS NOTES
Subjective:       Patient ID: Lucy Perez is a 60 y.o. female.    Vitals:  weight is 95.7 kg (211 lb). Her temperature is 98.9 °F (37.2 °C). Her blood pressure is 145/70 (abnormal) and her pulse is 95. Her respiration is 20 and oxygen saturation is 97%.     Chief Complaint: Cough    60 years old female with history of end-stage renal disease on dialysis and hypertension came with complaint of cough and chest congestion for 1 day.  Also complain of mild shortness of breath.  Denies chest pain, fever, chills, body aches, headache, nausea/vomiting, diarrhea, loss of smell or taste.     Cough  This is a new problem. The current episode started in the past 7 days. The problem has been unchanged. The problem occurs constantly. The cough is productive of sputum. Associated symptoms include shortness of breath. Pertinent negatives include no chest pain, chills, fever, headaches, myalgias, rash or sore throat. Nothing aggravates the symptoms. She has tried nothing for the symptoms.       Constitution: Negative for chills, fatigue and fever.   HENT: Negative for congestion and sore throat.    Neck: Negative for painful lymph nodes.   Cardiovascular: Negative for chest pain and leg swelling.   Eyes: Negative for double vision and blurred vision.   Respiratory: Positive for cough, sputum production and shortness of breath.    Gastrointestinal: Negative for nausea, vomiting and diarrhea.   Genitourinary: Negative for dysuria, frequency, urgency and history of kidney stones.   Musculoskeletal: Negative for joint pain, joint swelling, muscle cramps and muscle ache.   Skin: Negative for color change, pale, rash and bruising.   Allergic/Immunologic: Negative for seasonal allergies.   Neurological: Negative for dizziness, history of vertigo, light-headedness, passing out and headaches.   Hematologic/Lymphatic: Negative for swollen lymph nodes.   Psychiatric/Behavioral: Negative for nervous/anxious, sleep disturbance and  depression. The patient is not nervous/anxious.        Objective:      Physical Exam   Constitutional: She is oriented to person, place, and time. She appears well-developed. She is cooperative.  Non-toxic appearance. She does not appear ill. No distress.   HENT:   Head: Normocephalic and atraumatic.   Ears:   Right Ear: Hearing, tympanic membrane, external ear and ear canal normal. impacted cerumen  Left Ear: Hearing, tympanic membrane, external ear and ear canal normal. impacted cerumen  Nose: Nose normal. No mucosal edema, rhinorrhea or nasal deformity. No epistaxis. Right sinus exhibits no maxillary sinus tenderness and no frontal sinus tenderness. Left sinus exhibits no maxillary sinus tenderness and no frontal sinus tenderness.   Mouth/Throat: Uvula is midline, oropharynx is clear and moist and mucous membranes are normal. No trismus in the jaw. Normal dentition. No uvula swelling. No posterior oropharyngeal erythema.   Eyes: Conjunctivae and lids are normal. Right eye exhibits no discharge. Left eye exhibits no discharge. No scleral icterus.   Neck: Trachea normal, normal range of motion, full passive range of motion without pain and phonation normal. Neck supple.   Cardiovascular: Normal rate, regular rhythm, normal heart sounds and normal pulses.   No murmur heard.  Pulmonary/Chest: Effort normal and breath sounds normal. No stridor. No respiratory distress. She has no wheezes. She has no rhonchi. She has no rales. She exhibits no tenderness.   Abdominal: Soft. Normal appearance and bowel sounds are normal. She exhibits no distension, no pulsatile midline mass and no mass. There is no abdominal tenderness.   Musculoskeletal: Normal range of motion.         General: No deformity.   Neurological: She is alert and oriented to person, place, and time. She exhibits normal muscle tone. Coordination normal.   Skin: Skin is warm, dry, intact, not diaphoretic and not pale. Psychiatric: Her speech is normal and  behavior is normal. Judgment and thought content normal.   Nursing note and vitals reviewed.        Assessment:       1. Cough    2. Bronchitis        Plan:         Cough  -     POCT COVID-19 Rapid Screening  -     Cancel: X-Ray Chest PA And Lateral; Future; Expected date: 11/14/2020    Bronchitis    Other orders  -     doxycycline (VIBRA-TABS) 100 MG tablet; Take 1 tablet (100 mg total) by mouth 2 (two) times daily. for 7 days  Dispense: 14 tablet; Refill: 0  -     promethazine-dextromethorphan (PROMETHAZINE-DM) 6.25-15 mg/5 mL Syrp; Take 5 mLs by mouth 3 (three) times daily as needed (cough).  Dispense: 180 mL; Refill: 0  -     albuterol (PROVENTIL/VENTOLIN HFA) 90 mcg/actuation inhaler; Inhale 2 puffs into the lungs every 6 (six) hours as needed for Wheezing or Shortness of Breath. Rescue  Dispense: 18 g; Refill: 0        PLEASE READ YOUR DISCHARGE INSTRUCTIONS ENTIRELY AS IT CONTAINS IMPORTANT INFORMATION.    Please return here or go to the Emergency Department for any concerns or worsening of condition.    If you were prescribed antibiotics, please take them to completion.    If you were prescribed a cough medication, do not drive or operate heavy equipment or machinery while taking these medications.        If not allergic, please take over the counter Tylenol (Acetaminophen) as directed for control of pain and/or fever.  Please follow up with your primary care doctor or specialist as needed.      If you smoke, please stop smoking.    Please return or see your primary care doctor if you develop new or worsening symptoms.     Please arrange follow up with your primary medical clinic as soon as possible. You must understand that you've received an Urgent Care treatment only and that you may be released before all of your medical problems are known or treated. You, the patient, will arrange for follow up as instructed. If your symptoms worsen or fail to improve you should go to the Emergency Room.  Bronchitis,  Antibiotic Treatment (Adult)    Bronchitis is an infection of the air passages (bronchial tubes) in your lungs. It often occurs when you have a cold. This illness is contagious during the first few days and is spread through the air by coughing and sneezing, or by direct contact (touching the sick person and then touching your own eyes, nose, or mouth).  Symptoms of bronchitis include cough with mucus (phlegm) and low-grade fever. Bronchitis usually lasts 7 to 14 days. Mild cases can be treated with simple home remedies. More severe infection is treated with an antibiotic.  Home care  Follow these guidelines when caring for yourself at home:  · If your symptoms are severe, rest at home for the first 2 to 3 days. When you go back to your usual activities, don't let yourself get too tired.  · Do not smoke. Also avoid being exposed to secondhand smoke.  · You may use over-the-counter medicines to control fever or pain, unless another medicine was prescribed. (Note: If you have chronic liver or kidney disease or have ever had a stomach ulcer or gastrointestinal bleeding, talk with your healthcare provider before using these medicines. Also talk to your provider if you are taking medicine to prevent blood clots.) Aspirin should never be given to anyone younger than 18 years of age who is ill with a viral infection or fever. It may cause severe liver or brain damage.  · Your appetite may be poor, so a light diet is fine. Avoid dehydration by drinking 6 to 8 glasses of fluids per day (such as water, soft drinks, sports drinks, juices, tea, or soup). Extra fluids will help loosen secretions in the nose and lungs.  · Over-the-counter cough, cold, and sore-throat medicines will not shorten the length of the illness, but they may be helpful to reduce symptoms. (Note: Do not use decongestants if you have high blood pressure.)  · Finish all antibiotic medicine. Do this even if you are feeling better after only a few  days.  Follow-up care  Follow up with your healthcare provider, or as advised. If you had an X-ray or ECG (electrocardiogram), a specialist will review it. You will be notified of any new findings that may affect your care.  Note: If you are age 65 or older, or if you have a chronic lung disease or condition that affects your immune system, or you smoke, talk to your healthcare provider about having pneumococcal vaccinations and a yearly influenza vaccination (flu shot).  When to seek medical advice  Call your healthcare provider right away if any of these occur:  · Fever of 100.4°F (38°C) or higher  · Coughing up increased amounts of colored sputum  · Weakness, drowsiness, headache, facial pain, ear pain, or a stiff neck  Call 911, or get immediate medical care  Contact emergency services right away if any of these occur.  · Coughing up blood  · Worsening weakness, drowsiness, headache, or stiff neck  · Trouble breathing, wheezing, or pain with breathing  Date Last Reviewed: 9/13/2015  © 9878-4801 [employer]. [address] [place] [address], [place] [address]. All rights reserved. This information is not intended as a substitute for professional medical care. Always follow your healthcare professional's instructions.

## 2020-11-15 NOTE — PATIENT INSTRUCTIONS
PLEASE READ YOUR DISCHARGE INSTRUCTIONS ENTIRELY AS IT CONTAINS IMPORTANT INFORMATION.    Please return here or go to the Emergency Department for any concerns or worsening of condition.    If you were prescribed antibiotics, please take them to completion.    If you were prescribed a cough medication, do not drive or operate heavy equipment or machinery while taking these medications.        If not allergic, please take over the counter Tylenol (Acetaminophen) as directed for control of pain and/or fever.  Please follow up with your primary care doctor or specialist as needed.      If you smoke, please stop smoking.    Please return or see your primary care doctor if you develop new or worsening symptoms.     Please arrange follow up with your primary medical clinic as soon as possible. You must understand that you've received an Urgent Care treatment only and that you may be released before all of your medical problems are known or treated. You, the patient, will arrange for follow up as instructed. If your symptoms worsen or fail to improve you should go to the Emergency Room.  Bronchitis, Antibiotic Treatment (Adult)    Bronchitis is an infection of the air passages (bronchial tubes) in your lungs. It often occurs when you have a cold. This illness is contagious during the first few days and is spread through the air by coughing and sneezing, or by direct contact (touching the sick person and then touching your own eyes, nose, or mouth).  Symptoms of bronchitis include cough with mucus (phlegm) and low-grade fever. Bronchitis usually lasts 7 to 14 days. Mild cases can be treated with simple home remedies. More severe infection is treated with an antibiotic.  Home care  Follow these guidelines when caring for yourself at home:  · If your symptoms are severe, rest at home for the first 2 to 3 days. When you go back to your usual activities, don't let yourself get too tired.  · Do not smoke. Also avoid being  exposed to secondhand smoke.  · You may use over-the-counter medicines to control fever or pain, unless another medicine was prescribed. (Note: If you have chronic liver or kidney disease or have ever had a stomach ulcer or gastrointestinal bleeding, talk with your healthcare provider before using these medicines. Also talk to your provider if you are taking medicine to prevent blood clots.) Aspirin should never be given to anyone younger than 18 years of age who is ill with a viral infection or fever. It may cause severe liver or brain damage.  · Your appetite may be poor, so a light diet is fine. Avoid dehydration by drinking 6 to 8 glasses of fluids per day (such as water, soft drinks, sports drinks, juices, tea, or soup). Extra fluids will help loosen secretions in the nose and lungs.  · Over-the-counter cough, cold, and sore-throat medicines will not shorten the length of the illness, but they may be helpful to reduce symptoms. (Note: Do not use decongestants if you have high blood pressure.)  · Finish all antibiotic medicine. Do this even if you are feeling better after only a few days.  Follow-up care  Follow up with your healthcare provider, or as advised. If you had an X-ray or ECG (electrocardiogram), a specialist will review it. You will be notified of any new findings that may affect your care.  Note: If you are age 65 or older, or if you have a chronic lung disease or condition that affects your immune system, or you smoke, talk to your healthcare provider about having pneumococcal vaccinations and a yearly influenza vaccination (flu shot).  When to seek medical advice  Call your healthcare provider right away if any of these occur:  · Fever of 100.4°F (38°C) or higher  · Coughing up increased amounts of colored sputum  · Weakness, drowsiness, headache, facial pain, ear pain, or a stiff neck  Call 911, or get immediate medical care  Contact emergency services right away if any of these  occur.  · Coughing up blood  · Worsening weakness, drowsiness, headache, or stiff neck  · Trouble breathing, wheezing, or pain with breathing  Date Last Reviewed: 9/13/2015  © 0355-5729 [employer]. [address] [place] [address], [place] [address]. All rights reserved. This information is not intended as a substitute for professional medical care. Always follow your healthcare professional's instructions.

## 2020-11-16 ENCOUNTER — TELEPHONE (OUTPATIENT)
Dept: URGENT CARE | Facility: CLINIC | Age: 60
End: 2020-11-16

## 2020-11-17 NOTE — TELEPHONE ENCOUNTER
Called for f/u. Pt states she still has cough and chest congestion but not getting any worse. States was not able to start prescribed meds until today. Denies fever, Cp, SOB. Advised to f/u with PCP. All Qs answered.

## 2020-12-02 DIAGNOSIS — E11.69 TYPE 2 DIABETES MELLITUS WITH OTHER SPECIFIED COMPLICATION, WITH LONG-TERM CURRENT USE OF INSULIN: Primary | ICD-10-CM

## 2020-12-02 DIAGNOSIS — Z79.4 TYPE 2 DIABETES MELLITUS WITH OTHER SPECIFIED COMPLICATION, WITH LONG-TERM CURRENT USE OF INSULIN: Primary | ICD-10-CM

## 2020-12-03 ENCOUNTER — LAB VISIT (OUTPATIENT)
Dept: LAB | Facility: HOSPITAL | Age: 60
End: 2020-12-03
Attending: INTERNAL MEDICINE
Payer: MEDICARE

## 2020-12-03 ENCOUNTER — PATIENT OUTREACH (OUTPATIENT)
Dept: ADMINISTRATIVE | Facility: HOSPITAL | Age: 60
End: 2020-12-03

## 2020-12-03 DIAGNOSIS — D63.1 ANEMIA OF CHRONIC RENAL FAILURE, STAGE 5: Primary | ICD-10-CM

## 2020-12-03 DIAGNOSIS — Z79.4 TYPE 2 DIABETES MELLITUS WITH OTHER SPECIFIED COMPLICATION, WITH LONG-TERM CURRENT USE OF INSULIN: ICD-10-CM

## 2020-12-03 DIAGNOSIS — N18.5 ANEMIA OF CHRONIC RENAL FAILURE, STAGE 5: Primary | ICD-10-CM

## 2020-12-03 DIAGNOSIS — N18.5 CKD (CHRONIC KIDNEY DISEASE), STAGE V: ICD-10-CM

## 2020-12-03 DIAGNOSIS — D63.1 ANEMIA OF CHRONIC RENAL FAILURE, STAGE 5: ICD-10-CM

## 2020-12-03 DIAGNOSIS — N18.5 ANEMIA OF CHRONIC RENAL FAILURE, STAGE 5: ICD-10-CM

## 2020-12-03 DIAGNOSIS — E11.69 TYPE 2 DIABETES MELLITUS WITH OTHER SPECIFIED COMPLICATION, WITH LONG-TERM CURRENT USE OF INSULIN: ICD-10-CM

## 2020-12-03 LAB
ALBUMIN SERPL BCP-MCNC: 3.6 G/DL (ref 3.5–5.2)
ALBUMIN SERPL BCP-MCNC: 3.6 G/DL (ref 3.5–5.2)
ALP SERPL-CCNC: 135 U/L (ref 55–135)
ALT SERPL W/O P-5'-P-CCNC: 15 U/L (ref 10–44)
ANION GAP SERPL CALC-SCNC: 23 MMOL/L (ref 8–16)
ANION GAP SERPL CALC-SCNC: 23 MMOL/L (ref 8–16)
AST SERPL-CCNC: 14 U/L (ref 10–40)
BASOPHILS # BLD AUTO: 0.06 K/UL (ref 0–0.2)
BASOPHILS NFR BLD: 0.6 % (ref 0–1.9)
BILIRUB SERPL-MCNC: 0.5 MG/DL (ref 0.1–1)
BUN SERPL-MCNC: 75 MG/DL (ref 8–23)
BUN SERPL-MCNC: 75 MG/DL (ref 8–23)
CALCIUM SERPL-MCNC: 9.6 MG/DL (ref 8.7–10.5)
CALCIUM SERPL-MCNC: 9.6 MG/DL (ref 8.7–10.5)
CHLORIDE SERPL-SCNC: 102 MMOL/L (ref 95–110)
CHLORIDE SERPL-SCNC: 102 MMOL/L (ref 95–110)
CHOLEST SERPL-MCNC: 139 MG/DL (ref 120–199)
CHOLEST/HDLC SERPL: 2.5 {RATIO} (ref 2–5)
CO2 SERPL-SCNC: 22 MMOL/L (ref 23–29)
CO2 SERPL-SCNC: 22 MMOL/L (ref 23–29)
CREAT SERPL-MCNC: 13.1 MG/DL (ref 0.5–1.4)
CREAT SERPL-MCNC: 13.3 MG/DL (ref 0.5–1.4)
DIFFERENTIAL METHOD: ABNORMAL
EOSINOPHIL # BLD AUTO: 0.3 K/UL (ref 0–0.5)
EOSINOPHIL NFR BLD: 2.6 % (ref 0–8)
ERYTHROCYTE [DISTWIDTH] IN BLOOD BY AUTOMATED COUNT: 13.6 % (ref 11.5–14.5)
EST. GFR  (AFRICAN AMERICAN): 3.1 ML/MIN/1.73 M^2
EST. GFR  (AFRICAN AMERICAN): 3.1 ML/MIN/1.73 M^2
EST. GFR  (NON AFRICAN AMERICAN): 2.6 ML/MIN/1.73 M^2
EST. GFR  (NON AFRICAN AMERICAN): 2.7 ML/MIN/1.73 M^2
ESTIMATED AVG GLUCOSE: 237 MG/DL (ref 68–131)
FERRITIN SERPL-MCNC: 1847 NG/ML (ref 20–300)
GLUCOSE SERPL-MCNC: 228 MG/DL (ref 70–110)
GLUCOSE SERPL-MCNC: 229 MG/DL (ref 70–110)
HBA1C MFR BLD HPLC: 9.9 % (ref 4–5.6)
HCT VFR BLD AUTO: 32.6 % (ref 37–48.5)
HDLC SERPL-MCNC: 55 MG/DL (ref 40–75)
HDLC SERPL: 39.6 % (ref 20–50)
HGB BLD-MCNC: 9.7 G/DL (ref 12–16)
IMM GRANULOCYTES # BLD AUTO: 0.08 K/UL (ref 0–0.04)
IMM GRANULOCYTES NFR BLD AUTO: 0.8 % (ref 0–0.5)
IRON SERPL-MCNC: 35 UG/DL (ref 30–160)
LDLC SERPL CALC-MCNC: 64.8 MG/DL (ref 63–159)
LYMPHOCYTES # BLD AUTO: 1.5 K/UL (ref 1–4.8)
LYMPHOCYTES NFR BLD: 14.6 % (ref 18–48)
MAGNESIUM SERPL-MCNC: 2.5 MG/DL (ref 1.6–2.6)
MCH RBC QN AUTO: 31.9 PG (ref 27–31)
MCHC RBC AUTO-ENTMCNC: 29.8 G/DL (ref 32–36)
MCV RBC AUTO: 107 FL (ref 82–98)
MONOCYTES # BLD AUTO: 0.7 K/UL (ref 0.3–1)
MONOCYTES NFR BLD: 7.1 % (ref 4–15)
NEUTROPHILS # BLD AUTO: 7.4 K/UL (ref 1.8–7.7)
NEUTROPHILS NFR BLD: 74.3 % (ref 38–73)
NONHDLC SERPL-MCNC: 84 MG/DL
NRBC BLD-RTO: 0 /100 WBC
PHOSPHATE SERPL-MCNC: 7.4 MG/DL (ref 2.7–4.5)
PLATELET # BLD AUTO: 289 K/UL (ref 150–350)
PMV BLD AUTO: 10.3 FL (ref 9.2–12.9)
POTASSIUM SERPL-SCNC: 4.9 MMOL/L (ref 3.5–5.1)
POTASSIUM SERPL-SCNC: 4.9 MMOL/L (ref 3.5–5.1)
PROT SERPL-MCNC: 8.7 G/DL (ref 6–8.4)
PTH-INTACT SERPL-MCNC: 1131 PG/ML (ref 9–77)
RBC # BLD AUTO: 3.04 M/UL (ref 4–5.4)
SATURATED IRON: 20 % (ref 20–50)
SODIUM SERPL-SCNC: 147 MMOL/L (ref 136–145)
SODIUM SERPL-SCNC: 147 MMOL/L (ref 136–145)
TOTAL IRON BINDING CAPACITY: 178 UG/DL (ref 250–450)
TRANSFERRIN SERPL-MCNC: 120 MG/DL (ref 200–375)
TRIGL SERPL-MCNC: 96 MG/DL (ref 30–150)
WBC # BLD AUTO: 9.91 K/UL (ref 3.9–12.7)

## 2020-12-03 PROCEDURE — 82728 ASSAY OF FERRITIN: CPT | Mod: HCNC

## 2020-12-03 PROCEDURE — 83735 ASSAY OF MAGNESIUM: CPT | Mod: HCNC

## 2020-12-03 PROCEDURE — 85025 COMPLETE CBC W/AUTO DIFF WBC: CPT | Mod: HCNC

## 2020-12-03 PROCEDURE — 83540 ASSAY OF IRON: CPT | Mod: HCNC

## 2020-12-03 PROCEDURE — 83036 HEMOGLOBIN GLYCOSYLATED A1C: CPT | Mod: HCNC

## 2020-12-03 PROCEDURE — 83970 ASSAY OF PARATHORMONE: CPT | Mod: HCNC

## 2020-12-03 PROCEDURE — 80069 RENAL FUNCTION PANEL: CPT | Mod: HCNC

## 2020-12-03 PROCEDURE — 36415 COLL VENOUS BLD VENIPUNCTURE: CPT | Mod: HCNC

## 2020-12-03 PROCEDURE — 80061 LIPID PANEL: CPT | Mod: HCNC

## 2020-12-03 PROCEDURE — 80053 COMPREHEN METABOLIC PANEL: CPT | Mod: HCNC

## 2020-12-07 RX ORDER — LEVETIRACETAM 500 MG/1
500 TABLET ORAL
Qty: 36 TABLET | Refills: 3 | Status: CANCELLED | OUTPATIENT
Start: 2020-12-07 | End: 2021-12-07

## 2020-12-08 ENCOUNTER — TELEPHONE (OUTPATIENT)
Dept: INTERNAL MEDICINE | Facility: CLINIC | Age: 60
End: 2020-12-08

## 2020-12-08 DIAGNOSIS — G40.909 SEIZURE DISORDER: Primary | ICD-10-CM

## 2020-12-08 RX ORDER — LEVETIRACETAM 500 MG/1
TABLET ORAL
Qty: 180 TABLET | Refills: 1 | Status: SHIPPED | OUTPATIENT
Start: 2020-12-08 | End: 2021-06-18

## 2020-12-08 NOTE — TELEPHONE ENCOUNTER
Spoke with pt's , Wilder who requests a refill of pt's Keppra.  She is now taking Keppra 500mg at 2 tabs daily given her daily peritoneal dialysis. She ran out of keppra and had a brief sustained seizure earlier today.  She is doing well at present.  I have erx'd into [facility]: Keppra 500mg at 2 tabs/day  Pt or family to call me on my cell if any problems getting refilled now or in the future.

## 2020-12-10 ENCOUNTER — OFFICE VISIT (OUTPATIENT)
Dept: INTERNAL MEDICINE | Facility: CLINIC | Age: 60
End: 2020-12-10
Payer: MEDICARE

## 2020-12-10 VITALS
HEART RATE: 94 BPM | OXYGEN SATURATION: 94 % | WEIGHT: 211 LBS | HEIGHT: 63 IN | BODY MASS INDEX: 37.39 KG/M2 | DIASTOLIC BLOOD PRESSURE: 78 MMHG | SYSTOLIC BLOOD PRESSURE: 138 MMHG

## 2020-12-10 DIAGNOSIS — G40.909 SEIZURE DISORDER: ICD-10-CM

## 2020-12-10 DIAGNOSIS — Z12.31 ENCOUNTER FOR SCREENING MAMMOGRAM FOR MALIGNANT NEOPLASM OF BREAST: ICD-10-CM

## 2020-12-10 DIAGNOSIS — Z79.4 TYPE 2 DIABETES MELLITUS WITH OTHER SPECIFIED COMPLICATION, WITH LONG-TERM CURRENT USE OF INSULIN: Primary | ICD-10-CM

## 2020-12-10 DIAGNOSIS — Z99.2 ESRD ON PERITONEAL DIALYSIS: ICD-10-CM

## 2020-12-10 DIAGNOSIS — N18.6 ESRD ON PERITONEAL DIALYSIS: ICD-10-CM

## 2020-12-10 DIAGNOSIS — E55.9 VITAMIN D DEFICIENCY: ICD-10-CM

## 2020-12-10 DIAGNOSIS — I10 ESSENTIAL HYPERTENSION: ICD-10-CM

## 2020-12-10 DIAGNOSIS — J44.9 MODERATE COPD (CHRONIC OBSTRUCTIVE PULMONARY DISEASE): ICD-10-CM

## 2020-12-10 DIAGNOSIS — E78.5 HYPERLIPIDEMIA, UNSPECIFIED HYPERLIPIDEMIA TYPE: ICD-10-CM

## 2020-12-10 DIAGNOSIS — E11.69 TYPE 2 DIABETES MELLITUS WITH OTHER SPECIFIED COMPLICATION, WITH LONG-TERM CURRENT USE OF INSULIN: Primary | ICD-10-CM

## 2020-12-10 DIAGNOSIS — G81.14 SPASTIC HEMIPARESIS OF LEFT NONDOMINANT SIDE: ICD-10-CM

## 2020-12-10 PROCEDURE — 3008F BODY MASS INDEX DOCD: CPT | Mod: HCNC,CPTII,S$GLB, | Performed by: INTERNAL MEDICINE

## 2020-12-10 PROCEDURE — 1126F PR PAIN SEVERITY QUANTIFIED, NO PAIN PRESENT: ICD-10-PCS | Mod: HCNC,S$GLB,, | Performed by: INTERNAL MEDICINE

## 2020-12-10 PROCEDURE — 99499 RISK ADDL DX/OHS AUDIT: ICD-10-PCS | Mod: S$GLB,,, | Performed by: INTERNAL MEDICINE

## 2020-12-10 PROCEDURE — 99214 OFFICE O/P EST MOD 30 MIN: CPT | Mod: HCNC,S$GLB,, | Performed by: INTERNAL MEDICINE

## 2020-12-10 PROCEDURE — 3046F HEMOGLOBIN A1C LEVEL >9.0%: CPT | Mod: HCNC,CPTII,S$GLB, | Performed by: INTERNAL MEDICINE

## 2020-12-10 PROCEDURE — 99999 PR PBB SHADOW E&M-EST. PATIENT-LVL V: CPT | Mod: PBBFAC,HCNC,, | Performed by: INTERNAL MEDICINE

## 2020-12-10 PROCEDURE — 99214 PR OFFICE/OUTPT VISIT, EST, LEVL IV, 30-39 MIN: ICD-10-PCS | Mod: HCNC,S$GLB,, | Performed by: INTERNAL MEDICINE

## 2020-12-10 PROCEDURE — 3046F PR MOST RECENT HEMOGLOBIN A1C LEVEL > 9.0%: ICD-10-PCS | Mod: HCNC,CPTII,S$GLB, | Performed by: INTERNAL MEDICINE

## 2020-12-10 PROCEDURE — 99499 UNLISTED E&M SERVICE: CPT | Mod: S$GLB,,, | Performed by: INTERNAL MEDICINE

## 2020-12-10 PROCEDURE — 1126F AMNT PAIN NOTED NONE PRSNT: CPT | Mod: HCNC,S$GLB,, | Performed by: INTERNAL MEDICINE

## 2020-12-10 PROCEDURE — 99999 PR PBB SHADOW E&M-EST. PATIENT-LVL V: ICD-10-PCS | Mod: PBBFAC,HCNC,, | Performed by: INTERNAL MEDICINE

## 2020-12-10 PROCEDURE — 3008F PR BODY MASS INDEX (BMI) DOCUMENTED: ICD-10-PCS | Mod: HCNC,CPTII,S$GLB, | Performed by: INTERNAL MEDICINE

## 2020-12-10 NOTE — PROGRESS NOTES
Subjective:       Patient ID: Lucy Perez is a 60 y.o. female.    Chief Complaint:   Follow-up, Hypertension, Diabetes, and Hyperlipidemia    HPI: Mrs Ayala presents today for follow up of the above.  DM: She has gotten away from ADA diet; Weight up 30lbs in last 4 months.         Accuchecks:228,[phone], 329 AM and PM; She is using the FreeStyle Meter        Diet Diary:Breakfast: None,Hot tea Lunch:Beans/rice,water, Dinner: Cream of Chicken/riceSnacks: Grapes/strawberries        Medications: Novolog/Not taking, #2-Lantus 12 units QPM/fergets to take occasionally, #3-Glipizide 2.5mg QD        She has not been taking her Novolog with meals and occasionally her family forgets to give her her Lantus  Seizure D/O:  She is status post a self-limited seizure yesterday status post having run out of her Keppra x2 days.        Patient and her family have my cell phone and I have recommended that they call before she runs out of her Keppra I       in the future to avoid future seizures  ESRD on Peritoneal Dialysis:  She seems to be doing fine on her nightly peritoneal dialysis for which she follows with          Nephrologist Dr. Bower  S/p Hemorrhagic CVA(8/16): Lucy c/o continued muscle spasms on left side: arm/leg  where hemiparesis present;  She needs to re-establish care in [facility]  Asthma/COPD:  Patient does occasionally have wheezing for which she either uses her albuterol hand-held inhaler or her grandchild's nebulizer as her nebulizer machine is broken she would like to request a new one  THERESA: Mrs Ayala has still not gotten her CPAP machine/mask s/p repeat Sleep study 1/2020.  Her old CPAP machine broke a # of years ago so she has been without such x a  # years now.    Past Medical, Surgical, Social History: Please see as stated in Epic chart which has been reviewed.    Current Outpatient Medications   Medication Sig Dispense Refill    acetaminophen (TYLENOL) 325 MG tablet Take 2 tablets (650 mg total)  by mouth every 6 (six) hours as needed for Pain.  0    albuterol (PROVENTIL/VENTOLIN HFA) 90 mcg/actuation inhaler Inhale 2 puffs into the lungs every 6 (six) hours as needed for Wheezing or Shortness of Breath. Rescue 18 g 0    aspirin (ECOTRIN) 81 MG EC tablet Take 81 mg by mouth every morning.       atorvastatin (LIPITOR) 40 MG tablet TAKE 1 TABLET ONE TIME DAILY FOR CHOLESTEROL 90 tablet 2    B,C/folic/zinc/selenometh/D3/E (RENAPLEX-D ORAL) Take by mouth.      bisacodyl (DULCOLAX) 10 mg Supp Place 1 suppository (10 mg total) rectally daily as needed. 30 suppository 3    calcitRIOL (ROCALTROL) 0.5 MCG Cap Take by mouth.      clopidogrel (PLAVIX) 75 mg tablet Take 1 tablet (75 mg total) by mouth once daily. 120 tablet 6    cyclobenzaprine (FLEXERIL) 5 MG tablet 1 tab Q8 hours as needed for back pain 30 tablet 0    diclofenac sodium (VOLTAREN) 1 % Gel Apply 2gm to toe or hip  2-3x/day as needed 100 g 0    ergocalciferol (ERGOCALCIFEROL) 50,000 unit Cap Take 1 capsule (50,000 Units total) by mouth every 7 days. 12 capsule 2    famotidine (PEPCID) 20 MG tablet Take 1 tablet (20 mg total) by mouth once daily. 90 tablet 2    FLUoxetine 20 MG capsule Take 1 capsule by mouth once daily 90 capsule 2    folic acid (FOLVITE) 1 MG tablet Take 1 tablet (1,000 mcg total) by mouth once daily. 90 tablet 2    glipiZIDE (GLUCOTROL) 2.5 MG TR24 TAKE 1 TABLET BY MOUTH (HOLD IF LESS THAN 150) AT LUNCH (NON-DIALYSIS DAYS) 90 tablet 2    insulin (LANTUS SOLOSTAR U-100 INSULIN) glargine 100 units/mL (3mL) SubQ pen Inject 12 units at night. (Patient taking differently: Inject 14 Units into the skin every evening. Inject 14 units at night.) 6 mL 6    insulin aspart U-100 (NOVOLOG U-100 INSULIN ASPART) 100 unit/mL injection Use correction scale 180-230+1, 231-280+2, 281-330+3, 331-380+4, >380+5, max 15 units. 3 vial 3    levETIRAcetam (KEPPRA) 500 MG Tab 2 Tabs daily for Seizure prevention 180 tablet 1    losartan  "(COZAAR) 50 MG tablet Take 50 mg by mouth once daily.      ondansetron (ZOFRAN-ODT) 4 MG TbDL Take 1 tablet (4 mg total) by mouth every 8 (eight) hours as needed. 10 tablet 0    oxyCODONE (OXY-IR) 5 mg Cap Take 1 capsule (5 mg total) by mouth every 6 (six) hours as needed for Pain. 15 capsule 0    polyethylene glycol (MIRALAX) 17 gram/dose powder Take 17 g by mouth 2 (two) times daily. 1 Bottle 6    sevelamer carbonate (RENVELA) 800 mg Tab Take 800 mg by mouth 3 (three) times daily with meals.      torsemide (DEMADEX) 20 MG Tab Take 20 mg by mouth once daily.      traMADoL (ULTRAM) 50 mg tablet Take 1 tablet (50 mg total) by mouth every 8 (eight) hours as needed for Pain. 1 tab every 8-12 hours as needed for pain 30 tablet 0    BD INSULIN PEN NEEDLE UF SHORT 31 gauge x 5/16" Ndle USE TO INJECT NOVOLOG FLEXPEN BEFORE MEALS (Patient not taking: Reported on 12/10/2020) 150 each 11    blood sugar diagnostic Strp To check BG 4  times daily, to use with insurance preferred meter-true metrix (Patient not taking: Reported on 12/10/2020) 400 each 3    blood-glucose meter kit To check BG 4 times daily, to use with insurance preferred meter-true metrix 1 each 1    pen needle, diabetic (BD EMI 2ND GEN PEN NEEDLE) 32 gauge x 5/32" Ndle Uses once a day, 90 day (Patient not taking: Reported on 12/10/2020) 100 each 3    TRUEPLUS LANCETS 33 gauge Misc        No current facility-administered medications for this visit.        Review of Systems   Constitutional: Positive for fatigue. Negative for fever.   HENT: Positive for congestion and postnasal drip.    Respiratory: Negative for chest tightness and shortness of breath.    Cardiovascular: Negative for chest pain and leg swelling.   Gastrointestinal: Negative.    Musculoskeletal: Positive for myalgias.        Positive sharp muscle spasms with secondary pain in left foot and left arm   Neurological: Positive for weakness.        Left sided hemiparesis "   Psychiatric/Behavioral: Negative.        Objective:      Lab Results   Component Value Date    WBC 9.91 12/03/2020    HGB 9.7 (L) 12/03/2020    HCT 32.6 (L) 12/03/2020     12/03/2020    CHOL 139 12/03/2020    TRIG 96 12/03/2020    HDL 55 12/03/2020    ALT 15 12/03/2020    AST 14 12/03/2020     (H) 12/03/2020     (H) 12/03/2020    K 4.9 12/03/2020    K 4.9 12/03/2020     12/03/2020     12/03/2020    CREATININE 13.3 (H) 12/03/2020    CREATININE 13.1 (H) 12/03/2020    BUN 75 (H) 12/03/2020    BUN 75 (H) 12/03/2020    CO2 22 (L) 12/03/2020    CO2 22 (L) 12/03/2020    TSH 1.760 08/19/2020    INR 1.0 06/13/2020    GLUF 190 (H) 10/04/2004    HGBA1C 9.9 (H) 12/03/2020     Physical Exam  Constitutional:       Comments: Lucy is seated in a motorized wheelchair sitting comfortably   HENT:      Head: Normocephalic and atraumatic.   Neck:      Musculoskeletal: Normal range of motion and neck supple. No muscular tenderness.   Cardiovascular:      Rate and Rhythm: Normal rate and regular rhythm.      Heart sounds: Normal heart sounds.   Pulmonary:      Effort: Pulmonary effort is normal.      Breath sounds: Normal breath sounds. No wheezing.   Abdominal:      General: Bowel sounds are normal.      Palpations: Abdomen is soft. There is no mass.      Tenderness: There is no abdominal tenderness.      Comments: + abdominal wall  dialysis catheter in place which is nontender/no drainage   Musculoskeletal:      Right lower leg: No edema.      Left lower leg: No edema.   Lymphadenopathy:      Cervical: No cervical adenopathy.   Skin:     General: Skin is warm and dry.   Neurological:      Mental Status: She is alert.      Motor: Weakness present.      Comments: Positive for left upper and lower extremity weakness/hemiparesis   Psychiatric:         Mood and Affect: Mood normal.       Breasts:  On gross visualization normal, on palpation no masses tenderness or nipple discharge    Protective Sensation (w/  "10 gram monofilament):  Right: Decreased  Left: Absent    Visual Inspection:  + hyperpigmentation over dorsal foot and digits on left foot /right foot unremarkable    Pedal Pulses:   Right: Present  Left: Diminished    Posterior tibialis:   Right:Diminshed  Left: Diminshed    Vital Signs  Pulse: 94  SpO2: (!) 94 %  BP: 138/78  Pain Score: 0-No pain  Height and Weight  Height: 5' 3" (160 cm)  Weight: 95.7 kg (210 lb 15.7 oz)  BSA (Calculated - sq m): 2.06 sq meters  BMI (Calculated): 37.4  Weight in (lb) to have BMI = 25: 140.8]    Assessment:       1. Type 2 diabetes mellitus with other specified complication, with long-term current use of insulin    2. Seizure disorder    3. Essential hypertension    4. Hyperlipidemia, unspecified hyperlipidemia type    5. ESRD on peritoneal dialysis    6. Spastic hemiparesis of left nondominant side    7. Moderate COPD (chronic obstructive pulmonary disease)    8. Encounter for screening mammogram for malignant neoplasm of breast        Plan:     Health Maintenance   Topic Date Due    Pneumococcal Vaccine (Highest Risk) (2 of 3 - PCV13) 10/10/2019    Foot Exam  09/17/2020    Eye Exam  09/24/2020    Mammogram  10/03/2020    Hemoglobin A1c  06/03/2021    Lipid Panel  12/03/2021    TETANUS VACCINE  01/01/2029    Hepatitis C Screening  Completed        Lucy was seen today for follow-up, hypertension, diabetes and hyperlipidemia.    Diagnoses and all orders for this visit:    Type 2 diabetes mellitus with other specified complication, with long-term current use of insulin/uncontrolled  With HgbA1C up to 9.9% from 7.0%        -     'Have discussed with Lucy the importance of compliance with NovoLog/sliding scale before meals as well as Lantus nightly and following a healthier ADA diet        -     'Will schedule a follow-up [facility] clinic with Shirley Mixon  -     Ambulatory referral/consult to Optometry; Future    Seizure disorder/recent seizure secondary to " noncompliance        -     'Have encouraged daily compliance with Keppra 500 mg tabs at 2 tabs daily; patient and family have my cell phone number to ensure no problems with future refills    Essential hypertension/controlled        -     patient to continue on Demodex 20 mg q.day and Cozaar 50 mg q.day unless adjusted per patient's nephrologist, DR Bower    Hyperlipidemia, unspecified hyperlipidemia type/controlled       -      patient to continue on Lipitor 40 mg q.day    ESRD on peritoneal dialysis       -     'Will follow along as per patient's Nephrologist Dr. Bower    Spastic hemiparesis of left nondominant side  -     Ambulatory referral/consult to Physical Medicine Rehab; Future    Moderate COPD (chronic obstructive pulmonary disease)  -     NEBULIZER FOR HOME USE    THERESA/s/p Sleep study 1/2020-no new CPAP received  to date(Old CPAP machine from 2015 broke a # years ago)        -      Auto CPAP 6-10cm/FFMask    Encounter for screening mammogram for malignant neoplasm of breast  -     Mammo Digital Screening Bilat w/ [facility]; Future

## 2020-12-11 ENCOUNTER — TELEPHONE (OUTPATIENT)
Dept: ADMINISTRATIVE | Facility: OTHER | Age: 60
End: 2020-12-11

## 2020-12-22 ENCOUNTER — TELEPHONE (OUTPATIENT)
Dept: INTERNAL MEDICINE | Facility: CLINIC | Age: 60
End: 2020-12-22

## 2020-12-22 NOTE — TELEPHONE ENCOUNTER
----- Message from Neo Richardson sent at 12/22/2020  3:06 PM CST -----  Regarding: AutoPap  Contact: [phone]  I received an order for an AutoPap replacement but will need the current office notes to be updated to document the need for a replacement and that patient is benefiting from therapy. Thanks! Meghan

## 2020-12-23 DIAGNOSIS — N18.6 ESRD ON PERITONEAL DIALYSIS: Primary | ICD-10-CM

## 2020-12-23 DIAGNOSIS — D63.1 ANEMIA IN ESRD (END-STAGE RENAL DISEASE): ICD-10-CM

## 2020-12-23 DIAGNOSIS — Z99.2 ESRD ON PERITONEAL DIALYSIS: Primary | ICD-10-CM

## 2020-12-23 DIAGNOSIS — N18.6 ANEMIA IN ESRD (END-STAGE RENAL DISEASE): ICD-10-CM

## 2020-12-23 NOTE — TELEPHONE ENCOUNTER
Isaac, please call Meghan in [facility] and let him know the Sleep study was done in January 2020 and Aurto-CPAP ordered 8/2020(it took a while due to the pandemic) so this is a new order. Also, please fax the 2 notes I pribnted.  Thanks

## 2021-01-06 ENCOUNTER — TELEPHONE (OUTPATIENT)
Dept: INTERNAL MEDICINE | Facility: CLINIC | Age: 61
End: 2021-01-06

## 2021-01-12 NOTE — PROGRESS NOTES
MEDICARE WELLNESS VISIT NOTE      HISTORY OF PRESENT ILLNESS:   Flash Jones presents for her Subsequent Annual Medicare Wellness Visit.   She has no current complaints or concerns.      Patient Care Team:  Nick Thompson MD as PCP - General (Internal Medicine)  Elvin Monteiro MD as Cardiologist (Cardiology)  Jessica A Behrens, DO as Obstetrics & Gynecology (Obstetrics & Gynecology)  Yue Dorantes MD as Pulmonary Medicine (Internal Medicine - Pulmonary Disease)        Patient Active Problem List   Diagnosis   • Essential hypertension, benign   • Dyslipidemia   • CKD (chronic kidney disease)   • Coronary atherosclerosis of native coronary artery   • Spinal stenosis, lumbar region, without neurogenic claudication   • Spinal stenosis in cervical region   • Neuropathy   • Female stress incontinence   • History of bilateral carotid endarterectomy   • Chronic pain syndrome   • Bipolar 2 disorder, major depressive episode (CMS/[facility])   • MARY (generalized anxiety disorder)   • Type 2 diabetes mellitus with circulatory disorder, without long-term current use of insulin (CMS/[facility])   • Obesity (BMI 30-39.9)   • Tobacco abuse         Past Medical History:   Diagnosis Date   • Abnormal Pap smear     x2 1994 and 2001.Hx HSIL.2 past cryotherapy for cervix   • Acquired spondylolisthesis 5/22/2015   • Anxiety    • Anxiety and depression 5/26/2015   • Arthritis     back   • Carotid artery disease (CMS/HCC)    • Chronic kidney disease    • Chronic pain     GENERLIZED ALL OVER. BACK, HIPS AND KNEES MOSTLY   • COPD (chronic obstructive pulmonary disease) (CMS/HCC)    • Coronary artery disease 1/2012    [place]ivessel stent[place]   • Diabetes mellitus (CMS/HCC)    • Duplicated ureter, left     ? side   • Esophageal reflux    • Essential (primary) hypertension    • Genital warts    • Headache(784.0)     migraine vs stress HA (have been occurring after carotid surgery 7/2013)   • Heart murmur    • Hematuria     microscopic  Consult received per RN for wounds to buttocks. Pt was admitted on 7/5/19 acute respiratory failure with hypoxia and abdominal pain. Pt has PMHx of ESRD on HD MWF, last session Wednesday (7/3/19), T2DM on insulin, sarcoidosis, CHF (last EF 60-65%, grade 2 diastolic dysfunction), RA, CVA from hemorrhage with residual L hemiplegia, and epilepsy.    Received pt awake and alert with  at bedside. Pt's  reports that she has a history of pressures in the past and that the areas to her buttocks are healed.     Upon assessment of pt's bilateral buttocks:intact pink skin without any drainage noted. Scar tissue to bilateral buttocks is dry and fragile and remains at risk for breakdown. Applied barrier paste with a sacral border foam to protect the pt's coccyx.     (see assessment and photo below)    Offered to order a waffle overlay or a FRANKY mattress for the pt. Pt refused. Preferring the harder bed surface due to back pain. Reinforced the importance of continuing to turn the pt to prevent pressure injuries from developing. Both the pt and pt's  verbalized understanding.     Recommendations:  -Nursing to cleanse sacrum/buttocks with cleansing cloths and apply moisture barrier (black top) BID and PRN cleaning. A sacral border foam may be used PRN to protect bony prominences.  -Nursing to maintain all pressure prevention interventions to include: EHOB waffle overlay,  heel protectors, urinary diversion device (Pure-wick, catheter), fecal management system(if needed for stools),turn q 2 hours, pillows/wedge for repositioning, blue pad to wick away moisture, HOB no higher than 30 degrees/knee gatch up unless otherwise indicated,  barrier cream/paste as needed for moisture control, and adequate calories.     Notified pt's nurse of care given and instructed nursing to please notify wound care team if pt would prefer a different bed or if wounds appear to be getting worse.    Wound care will continue to follow pt  PRN. [id number]       07/08/19 1254        Pressure Injury 07/05/19 1600 Right Buttocks Stage 2   Date First Assessed/Time First Assessed: 07/05/19 1600   Pressure Injury Present on Admission: yes  Side: Right  Location: Buttocks  Staging: Stage 2  Wound Outcome: Healed   Wound Image    Staging Stage 2  (healed)   Dressing Appearance Open to air;No dressing  (barrier cream in use)   Drainage Amount None   Drainage Characteristics/Odor No odor   Appearance Pink;Epithelialization   Periwound Area Intact;Dry;Scar tissue   Wound Edges Other (see comments)  (no open area noted)   Care Cleansed with:;Sterile normal saline;Applied:;Skin Barrier   Dressing Applied;Foam   Dressing Change Due 07/08/19        Pressure Injury 07/05/19 Left Buttocks Stage 2   Date First Assessed: 07/05/19   Pressure Injury Present on Admission: yes  Side: Left  Location: Buttocks  Staging: Stage 2  Wound Outcome: Healed   Wound Image   ((see photo incorporating both healed areas))   Staging Stage 2  (healed)   Dressing Appearance Open to air;No dressing   Drainage Amount None   Drainage Characteristics/Odor No odor   Appearance Pink;Epithelialization   Periwound Area Intact;Scar tissue   Care Cleansed with:;Sterile normal saline;Applied:;Skin Barrier   Dressing Applied;Foam   Periwound Care Topical treatment applied   Dressing Change Due 07/08/19       Scar tissue with dry skin to bilateral buttocks         and s/p cystoscopy   • Herpes simplex without mention of complication     SHINGLES AGE 18-21   • History of depression    • Hyperlipidemia    • Major depressive disorder, recurrent episode, severe, without mention of psychotic behavior 7/21/2015   • MI (myocardial infarction) (CMS/[facility])     2012 WITH STENT   • Migraine    • Obstructive chronic bronchitis with exacerbation (CMS/[facility]) 4/1/2013   • PONV (postoperative nausea and vomiting)    • S/P carotid endarterectomy 09/09/2013    right AND LEFT SIDE.   • Skin tag of perianal region 8/22/2013   • Sleep apnea     uses CPAP    • STD (sexually transmitted disease)     CLAMYDIA, TRICH, VD MANY YEARS AGO.    • Tattoos    • Type 2 diabetes mellitus with circulatory disorder, without long-term current use of insulin (CMS/[facility]) 6/26/2018   • Vulvovaginal melanosis 8/22/2013         Past Surgical History:   Procedure Laterality Date   • Breast reduction surgery  11/2015    dr carter \"some abnormal cells\"   • Colonoscopy diagnostic  10/01/2013    normal, 10yr recall screening Affi 2023   • Coronary angiogram - cv  5/10/2013    with carotid angios    • Coronary angioplasty with stent placement  2012    [place] drug eluting stent x3 (OM1, distal LCx, prox LCx)   • Cryotherapy      cervix x2   • Dilation and curettage of uterus     • Hysteroscopy     • Pelvic laparoscopy  1981    pelvic pain and adhesions   • Ptca  4/2012   • Tonsillectomy and adenoidectomy     • Tubal ligation     • Vaginal delivery  x3   • Vascular surgery  7-    Left Carotid Endartectomy.   • Vascular surgery  9/2013    right  carotid endarterectomy         Social History     Tobacco Use   • Smoking status: Current Every Day Smoker     Packs/day: 1.00     Years: 37.00     Pack years: 37.00     Types: Cigarettes   • Smokeless tobacco: Never Used   • Tobacco comment: Trying to quit now 0.25 ppd   Substance Use Topics   • Alcohol use: Yes     Alcohol/week: 0.0 standard drinks     Frequency: Monthly or  less     Drinks per session: 1 or 2     Binge frequency: Never     Comment: couple times a year   • Drug use: No     Drug use:    Drug Use:    No              Family History   Problem Relation Age of Onset   • Coronary Artery Disease Mother    • Hypertension Mother    • High cholesterol Mother    • COPD Mother    • Coronary Artery Disease Father    • Diabetes Father    • High cholesterol Father    • Hypertension Father    • Cancer Father    • Diabetes Paternal Aunt    • Diabetes Paternal Uncle    • Hypertension Maternal Grandfather    • High cholesterol Maternal Grandfather    • Cancer Maternal Grandfather         Bladder cancer   • Diabetes Daughter    • Cancer, Breast Neg Hx    • Cancer, Colon Neg Hx    • Cancer, Ovarian Neg Hx    • Thyroid Neg Hx        Current Outpatient Medications   Medication Sig Dispense Refill   • aspirin 81 MG EC tablet Take 81 mg by mouth daily.     • furosemide (LASIX) 20 MG tablet TAKE 1 TABLET BY MOUTH  DAILY 90 tablet 2   • pioglitazone (ACTOS) 30 MG tablet TAKE 1 TABLET BY MOUTH  DAILY 90 tablet 0   • pantoprazole (PROTONIX) 40 MG tablet TAKE 1 TABLET BY MOUTH  DAILY 90 tablet 0   • gabapentin (NEURONTIN) 300 MG capsule TAKE 3 CAPSULES BY MOUTH AT NIGHT 270 capsule 3   • rOPINIRole (REQUIP) 0.5 MG tablet TAKE 1 TABLET BY MOUTH AT  BEDTIME AS NEEDED FOR  RESTLESS LEG SYNDROME 90 tablet 3   • traMADol (ULTRAM) 50 MG tablet Take 1 tablet by mouth 2 times daily as needed for Pain. 60 tablet 0   • lamoTRIgine (LAMICTAL) 100 MG tablet Take 1 tablet by mouth 2 times daily 180 tablet 1   • escitalopram (LEXAPRO) 5 MG tablet Take 1 tablet by mouth daily. Take with one tab 10 mg 90 tablet 1   • escitalopram (LEXAPRO) 10 MG tablet TAKE 1 TABLET BY MOUTH  DAILY WITH 5MG FOR A TOTAL  DAILY DOSE OF 15MG 90 tablet 1   • buPROPion (WELLBUTRIN SR) 200 MG 12 hr tablet Take 1 tablet by mouth 2 times daily. 180 tablet 1   • modafinil (PROVIGIL) 200 MG tablet Take 1 tablet by mouth daily. Indications:  Obstructive Sleep Apnea Syndrome 30 tablet 2   • enalapril (VASOTEC) 10 MG tablet Take 1 tablet by mouth daily. 90 tablet 3   • rosuvastatin (CRESTOR) 20 MG tablet Take 1 tablet by mouth daily. 90 tablet 3   • PROAIR  (90 Base) MCG/ACT inhaler INHALE 2 PUFFS BY MOUTH  EVERY 4 HOURS AS NEEDED FOR SHORTNESS OF BREATH OR  WHEEZING 8.5 g 11   • lidocaine (XYLOCAINE) 5 % ointment Apply topically as needed for Pain. 2-3 times daily 35.44 g 2   • acetaminophen (TYLENOL) 500 MG tablet Take 500 mg by mouth every 6 hours as needed for Pain.     • fluticasone (FLONASE) 50 MCG/ACT nasal spray USE 2 SPRAYS IN EACH  NOSTRIL DAILY 48 g 1   • Multiple Vitamins-Minerals (MULTIVITAL-M) Tab Take by mouth daily.     • albuterol-ipratropium (COMBIVENT RESPIMAT) 100-20 MCG/ACT inhaler Inhale 1 puff into the lungs every 4 hours as needed for Shortness of Breath. 4 g 3   • SOFTCLIX LANCETS Misc USE TO OBTAIN BLOOD SAMPLE  TWICE DAILY FOR BLOOD  GLUCOSE TESTING 100 each 6   • ACCU-CHEK IVETH PLUS test strip TEST BLOOD SUGAR TWO TIMES  DAILY AS DIRECTED 200 strip 1   • nitroGLYcerin (NITROSTAT) 0.4 MG sublingual tablet DISSOLVE 1 TABLET UNDER THE TONGUE AT ONSET OF CHEST  PAIN; MAY REPEAT EVERY 5  MIN UP TO MAX OF 3 DOSES.  IF NO RELIEF, CALL 911. 100 tablet 1   • Blood Glucose Monitoring Suppl (ACCU-CHEK IVETH PLUS) w/Device Kit 1 kit by Other route as directed. 1 kit 0     No current facility-administered medications for this visit.         The following items on the Medicare Health Risk Assessment were found to be positive  1.) Do you have an Advance directive, living will, or power of  for health care document that contains your wishes for end of life care?: No     5.) Do you do moderate to strenuous exercise (brisk walk) for about 20 minutes for 3 or more days per week?: No, I usually do not exercise this much     6 a.) How many servings of Fruits and Vegetables do you have each day ( 1 serving = 1 piece of fruit, 1/2 cup  fruits or vegetables): 1 per day     6 d.) How many servings of Sugar Sweetened Beverages do you have each day ( 1 serving = 1 can or 12 oz cup of sode or juice): 4 or more per day     7.) Have you had a fall two or more times in the past year?: Yes     10.) How often do you have trouble taking medicines the way you have been told to take them?: Sometimes I take my prescribed medications     11a.) Bladder Control problems (urine leaking): Often     11c.) Teeth or Denture Problems: Always     11d.) Bodily pain: Always     11e.) Tiredness or Fatigue: Always     11h.) Problems with your hearing: Often     11i.) Problems using the telephone: Sometimes     11j.) Problems with your balance: Often     14.) During the past 4 weeks, was someone available to help if you needed and wanted help?: Yes, some         Vision and Hearing screens: glasses , hearing without difficulty    Advance Directive:   The patient has the following documents:  Power of  for Health Care    Cognitive Assessment: no evidence of cognitive dysfunction by direct observation    Recent PHQ 2/9 Score    PHQ 2:  Date Adult PHQ 2 Score Adult PHQ 2 Interpretation   1/12/2021 0 No further screening needed       PHQ 9:       DEPRESSION ASSESSMENT/PLAN:  treated for bipolar disorder     Body mass index is 39.59 kg/m².    BMI ASSESSMENT/PLAN:  Patient is obese.    low flour low sugar diet        Needed Screening/Treatment:   Bone density and Immunizations reviewed and patient needs: Influenza  Needed follow up:  with GI on nausea complaint    See orders.   See Patient Instructions section.   Return in about 1 year (around 1/12/2022) for Medicare Wellness Visit.

## 2021-01-14 ENCOUNTER — TELEPHONE (OUTPATIENT)
Dept: INTERNAL MEDICINE | Facility: CLINIC | Age: 61
End: 2021-01-14

## 2021-01-19 ENCOUNTER — TELEPHONE (OUTPATIENT)
Dept: INTERNAL MEDICINE | Facility: CLINIC | Age: 61
End: 2021-01-19

## 2021-01-25 ENCOUNTER — PATIENT OUTREACH (OUTPATIENT)
Dept: ADMINISTRATIVE | Facility: OTHER | Age: 61
End: 2021-01-25

## 2021-02-04 DIAGNOSIS — Z99.2 ESRD ON PERITONEAL DIALYSIS: Primary | ICD-10-CM

## 2021-02-04 DIAGNOSIS — N18.6 ESRD ON PERITONEAL DIALYSIS: Primary | ICD-10-CM

## 2021-02-04 RX ORDER — SODIUM POLYSTYRENE SULFONATE 4.1 MEQ/G
15 POWDER, FOR SUSPENSION ORAL; RECTAL 2 TIMES DAILY
Qty: 1 BOTTLE | Refills: 0 | Status: SHIPPED | OUTPATIENT
Start: 2021-02-04

## 2021-02-09 ENCOUNTER — TELEPHONE (OUTPATIENT)
Dept: INTERNAL MEDICINE | Facility: CLINIC | Age: 61
End: 2021-02-09

## 2021-02-09 ENCOUNTER — HOSPITAL ENCOUNTER (OUTPATIENT)
Dept: RADIOLOGY | Facility: HOSPITAL | Age: 61
Discharge: HOME | End: 2021-02-09
Attending: STUDENT IN AN ORGANIZED HEALTH CARE EDUCATION/TRAINING PROGRAM
Payer: MEDICARE

## 2021-02-09 ENCOUNTER — OFFICE VISIT (OUTPATIENT)
Dept: INTERNAL MEDICINE | Facility: CLINIC | Age: 61
End: 2021-02-09
Payer: MEDICARE

## 2021-02-09 VITALS
DIASTOLIC BLOOD PRESSURE: 81 MMHG | SYSTOLIC BLOOD PRESSURE: 139 MMHG | WEIGHT: 211 LBS | OXYGEN SATURATION: 100 % | HEART RATE: 62 BPM | BODY MASS INDEX: 37.39 KG/M2 | HEIGHT: 63 IN

## 2021-02-09 VITALS — BODY MASS INDEX: 37.39 KG/M2 | WEIGHT: 211 LBS | HEIGHT: 63 IN

## 2021-02-09 DIAGNOSIS — M79.642 LEFT HAND PAIN: ICD-10-CM

## 2021-02-09 DIAGNOSIS — E11.69 TYPE 2 DIABETES MELLITUS WITH OBESITY: Primary | ICD-10-CM

## 2021-02-09 DIAGNOSIS — R60.0 LOCALIZED EDEMA: ICD-10-CM

## 2021-02-09 DIAGNOSIS — I69.359 HEMIPLEGIA AS LATE EFFECT OF STROKE: ICD-10-CM

## 2021-02-09 DIAGNOSIS — Z12.31 ENCOUNTER FOR SCREENING MAMMOGRAM FOR MALIGNANT NEOPLASM OF BREAST: ICD-10-CM

## 2021-02-09 DIAGNOSIS — I69.359 CVA, OLD, HEMIPARESIS: ICD-10-CM

## 2021-02-09 DIAGNOSIS — E78.5 DYSLIPIDEMIA, GOAL LDL BELOW 70: ICD-10-CM

## 2021-02-09 DIAGNOSIS — R09.81 NASAL CONGESTION: ICD-10-CM

## 2021-02-09 DIAGNOSIS — F41.8 MIXED ANXIETY AND DEPRESSIVE DISORDER: ICD-10-CM

## 2021-02-09 DIAGNOSIS — N18.6 ESRD ON PERITONEAL DIALYSIS: ICD-10-CM

## 2021-02-09 DIAGNOSIS — Z99.2 ESRD ON PERITONEAL DIALYSIS: ICD-10-CM

## 2021-02-09 DIAGNOSIS — M79.642 HAND PAIN, LEFT: Primary | ICD-10-CM

## 2021-02-09 DIAGNOSIS — M25.552 PAIN OF LEFT HIP JOINT: ICD-10-CM

## 2021-02-09 DIAGNOSIS — M79.89 SWELLING OF LEFT HAND: Primary | ICD-10-CM

## 2021-02-09 DIAGNOSIS — I73.9 PVD (PERIPHERAL VASCULAR DISEASE): ICD-10-CM

## 2021-02-09 DIAGNOSIS — M06.9 RHEUMATOID ARTHRITIS INVOLVING MULTIPLE SITES, UNSPECIFIED WHETHER RHEUMATOID FACTOR PRESENT: ICD-10-CM

## 2021-02-09 DIAGNOSIS — M79.89 SWELLING OF LEFT HAND: ICD-10-CM

## 2021-02-09 DIAGNOSIS — Z11.4 ENCOUNTER FOR SCREENING FOR HIV: ICD-10-CM

## 2021-02-09 DIAGNOSIS — I50.32 CHRONIC DIASTOLIC HEART FAILURE: ICD-10-CM

## 2021-02-09 DIAGNOSIS — G47.33 OSA (OBSTRUCTIVE SLEEP APNEA): ICD-10-CM

## 2021-02-09 DIAGNOSIS — E66.9 TYPE 2 DIABETES MELLITUS WITH OBESITY: Primary | ICD-10-CM

## 2021-02-09 PROCEDURE — 99999 PR PBB SHADOW E&M-EST. PATIENT-LVL III: ICD-10-PCS | Mod: PBBFAC,HCNC,, | Performed by: NURSE PRACTITIONER

## 2021-02-09 PROCEDURE — 99499 RISK ADDL DX/OHS AUDIT: ICD-10-PCS | Mod: S$GLB,,, | Performed by: NURSE PRACTITIONER

## 2021-02-09 PROCEDURE — 99999 PR PBB SHADOW E&M-EST. PATIENT-LVL III: CPT | Mod: PBBFAC,HCNC,, | Performed by: NURSE PRACTITIONER

## 2021-02-09 PROCEDURE — 3008F PR BODY MASS INDEX (BMI) DOCUMENTED: ICD-10-PCS | Mod: HCNC,CPTII,S$GLB, | Performed by: NURSE PRACTITIONER

## 2021-02-09 PROCEDURE — 99213 PR OFFICE/OUTPT VISIT, EST, LEVL III, 20-29 MIN: ICD-10-PCS | Mod: GC,S$GLB,, | Performed by: STUDENT IN AN ORGANIZED HEALTH CARE EDUCATION/TRAINING PROGRAM

## 2021-02-09 PROCEDURE — 3008F BODY MASS INDEX DOCD: CPT | Mod: HCNC,CPTII,S$GLB, | Performed by: NURSE PRACTITIONER

## 2021-02-09 PROCEDURE — 99499 UNLISTED E&M SERVICE: CPT | Mod: HCNC,S$GLB,, | Performed by: NURSE PRACTITIONER

## 2021-02-09 PROCEDURE — 99213 OFFICE O/P EST LOW 20 MIN: CPT | Mod: GC,S$GLB,, | Performed by: STUDENT IN AN ORGANIZED HEALTH CARE EDUCATION/TRAINING PROGRAM

## 2021-02-09 PROCEDURE — 3046F PR MOST RECENT HEMOGLOBIN A1C LEVEL > 9.0%: ICD-10-PCS | Mod: HCNC,CPTII,S$GLB, | Performed by: NURSE PRACTITIONER

## 2021-02-09 PROCEDURE — 73110 X-RAY EXAM OF WRIST: CPT | Mod: TC,HCNC,LT

## 2021-02-09 PROCEDURE — 73110 X-RAY EXAM OF WRIST: CPT | Mod: 26,HCNC,LT, | Performed by: RADIOLOGY

## 2021-02-09 PROCEDURE — 99999 PR PBB SHADOW E&M-EST. PATIENT-LVL V: CPT | Mod: PBBFAC,HCNC,GC, | Performed by: STUDENT IN AN ORGANIZED HEALTH CARE EDUCATION/TRAINING PROGRAM

## 2021-02-09 PROCEDURE — 1125F PR PAIN SEVERITY QUANTIFIED, PAIN PRESENT: ICD-10-PCS | Mod: HCNC,S$GLB,, | Performed by: NURSE PRACTITIONER

## 2021-02-09 PROCEDURE — 1125F AMNT PAIN NOTED PAIN PRSNT: CPT | Mod: HCNC,S$GLB,, | Performed by: NURSE PRACTITIONER

## 2021-02-09 PROCEDURE — 73110 XR WRIST COMPLETE 3 VIEWS LEFT: ICD-10-PCS | Mod: 26,HCNC,LT, | Performed by: RADIOLOGY

## 2021-02-09 PROCEDURE — 3046F HEMOGLOBIN A1C LEVEL >9.0%: CPT | Mod: HCNC,CPTII,S$GLB, | Performed by: NURSE PRACTITIONER

## 2021-02-09 PROCEDURE — 99499 UNLISTED E&M SERVICE: CPT | Mod: S$GLB,,, | Performed by: NURSE PRACTITIONER

## 2021-02-09 PROCEDURE — 99999 PR PBB SHADOW E&M-EST. PATIENT-LVL V: ICD-10-PCS | Mod: PBBFAC,HCNC,GC, | Performed by: STUDENT IN AN ORGANIZED HEALTH CARE EDUCATION/TRAINING PROGRAM

## 2021-02-09 RX ORDER — INSULIN ASPART 100 [IU]/ML
INJECTION, SOLUTION INTRAVENOUS; SUBCUTANEOUS
Qty: 3 VIAL | Refills: 3
Start: 2021-02-09

## 2021-02-09 RX ORDER — TRAMADOL HYDROCHLORIDE 50 MG/1
50 TABLET ORAL EVERY 8 HOURS PRN
Qty: 30 TABLET | Refills: 0 | Status: SHIPPED | OUTPATIENT
Start: 2021-02-09

## 2021-02-09 RX ORDER — INSULIN GLARGINE 100 [IU]/ML
INJECTION, SOLUTION SUBCUTANEOUS
Qty: 6 ML | Refills: 6
Start: 2021-02-09

## 2021-02-09 RX ORDER — CALCIUM CARB/VITAMIN D3/VIT K1 500-100-40
TABLET,CHEWABLE ORAL
Qty: 100 EACH | Refills: 3 | Status: SHIPPED | OUTPATIENT
Start: 2021-02-09

## 2021-02-09 RX ORDER — CALCIUM CARB/VITAMIN D3/VIT K1 500-100-40
TABLET,CHEWABLE ORAL
Qty: 100 EACH | Refills: 3 | Status: SHIPPED | OUTPATIENT
Start: 2021-02-09 | End: 2021-02-09 | Stop reason: SDUPTHER

## 2021-02-10 ENCOUNTER — TELEPHONE (OUTPATIENT)
Dept: INTERNAL MEDICINE | Facility: CLINIC | Age: 61
End: 2021-02-10

## 2021-02-10 ENCOUNTER — TELEPHONE (OUTPATIENT)
Dept: ORTHOPEDICS | Facility: CLINIC | Age: 61
End: 2021-02-10

## 2021-02-10 DIAGNOSIS — S69.92XA HAND INJURY, LEFT, INITIAL ENCOUNTER: Primary | ICD-10-CM

## 2021-02-10 DIAGNOSIS — S62.002B OPEN NONDISPLACED FRACTURE OF SCAPHOID OF LEFT WRIST, UNSPECIFIED PORTION OF SCAPHOID, INITIAL ENCOUNTER: Primary | ICD-10-CM

## 2021-02-11 ENCOUNTER — HOSPITAL ENCOUNTER (OUTPATIENT)
Dept: RADIOLOGY | Facility: HOSPITAL | Age: 61
Discharge: HOME | End: 2021-02-11
Attending: ORTHOPAEDIC SURGERY
Payer: MEDICARE

## 2021-02-11 ENCOUNTER — OFFICE VISIT (OUTPATIENT)
Dept: ORTHOPEDICS | Facility: CLINIC | Age: 61
End: 2021-02-11
Payer: MEDICARE

## 2021-02-11 VITALS — WEIGHT: 211 LBS | HEIGHT: 63 IN | BODY MASS INDEX: 37.39 KG/M2

## 2021-02-11 DIAGNOSIS — S62.002B OPEN NONDISPLACED FRACTURE OF SCAPHOID OF LEFT WRIST, UNSPECIFIED PORTION OF SCAPHOID, INITIAL ENCOUNTER: ICD-10-CM

## 2021-02-11 DIAGNOSIS — S69.92XA HAND INJURY, LEFT, INITIAL ENCOUNTER: ICD-10-CM

## 2021-02-11 PROCEDURE — 1125F AMNT PAIN NOTED PAIN PRSNT: CPT | Mod: S$GLB,,, | Performed by: ORTHOPAEDIC SURGERY

## 2021-02-11 PROCEDURE — 99203 OFFICE O/P NEW LOW 30 MIN: CPT | Mod: S$GLB,,, | Performed by: ORTHOPAEDIC SURGERY

## 2021-02-11 PROCEDURE — 3008F BODY MASS INDEX DOCD: CPT | Mod: CPTII,S$GLB,, | Performed by: ORTHOPAEDIC SURGERY

## 2021-02-11 PROCEDURE — 73130 XR HAND COMPLETE 3 VIEW LEFT: ICD-10-PCS | Mod: 26,LT,, | Performed by: RADIOLOGY

## 2021-02-11 PROCEDURE — 73130 X-RAY EXAM OF HAND: CPT | Mod: 26,LT,, | Performed by: RADIOLOGY

## 2021-02-11 PROCEDURE — 99999 PR PBB SHADOW E&M-EST. PATIENT-LVL V: CPT | Mod: PBBFAC,,, | Performed by: ORTHOPAEDIC SURGERY

## 2021-02-11 PROCEDURE — 99999 PR PBB SHADOW E&M-EST. PATIENT-LVL V: ICD-10-PCS | Mod: PBBFAC,,, | Performed by: ORTHOPAEDIC SURGERY

## 2021-02-11 PROCEDURE — 1125F PR PAIN SEVERITY QUANTIFIED, PAIN PRESENT: ICD-10-PCS | Mod: S$GLB,,, | Performed by: ORTHOPAEDIC SURGERY

## 2021-02-11 PROCEDURE — 73130 X-RAY EXAM OF HAND: CPT | Mod: TC,PN,LT

## 2021-02-11 PROCEDURE — 99203 PR OFFICE/OUTPT VISIT, NEW, LEVL III, 30-44 MIN: ICD-10-PCS | Mod: S$GLB,,, | Performed by: ORTHOPAEDIC SURGERY

## 2021-02-11 PROCEDURE — 3008F PR BODY MASS INDEX (BMI) DOCUMENTED: ICD-10-PCS | Mod: CPTII,S$GLB,, | Performed by: ORTHOPAEDIC SURGERY

## 2021-02-11 RX ORDER — OXYCODONE AND ACETAMINOPHEN 5; 325 MG/1; MG/1
1 TABLET ORAL EVERY 8 HOURS PRN
Qty: 21 TABLET | Refills: 0 | Status: SHIPPED | OUTPATIENT
Start: 2021-02-11

## 2021-02-11 RX ORDER — AMLODIPINE BESYLATE 5 MG/1
5 TABLET ORAL DAILY
COMMUNITY
Start: 2020-12-22

## 2021-02-11 RX ORDER — CINACALCET 30 MG/1
TABLET, FILM COATED ORAL
COMMUNITY
Start: 2021-01-14

## 2021-02-11 RX ORDER — SUCROFERRIC OXYHYDROXIDE 500 MG/1
1 TABLET, CHEWABLE ORAL
COMMUNITY
Start: 2021-01-11

## 2021-02-11 RX ORDER — ASCORBIC ACID 125 MG
1 TABLET,CHEWABLE ORAL
COMMUNITY
Start: 2019-12-05

## 2021-02-17 ENCOUNTER — OFFICE VISIT (OUTPATIENT)
Dept: PODIATRY | Facility: CLINIC | Age: 61
End: 2021-02-17
Payer: MEDICARE

## 2021-02-17 VITALS — RESPIRATION RATE: 18 BRPM | WEIGHT: 210 LBS | BODY MASS INDEX: 37.21 KG/M2 | HEIGHT: 63 IN

## 2021-02-17 DIAGNOSIS — L60.9 DISEASE OF NAIL: ICD-10-CM

## 2021-02-17 DIAGNOSIS — E11.49 TYPE II DIABETES MELLITUS WITH NEUROLOGICAL MANIFESTATIONS: Primary | ICD-10-CM

## 2021-02-17 PROCEDURE — 3008F PR BODY MASS INDEX (BMI) DOCUMENTED: ICD-10-PCS | Mod: CPTII,S$GLB,, | Performed by: PODIATRIST

## 2021-02-17 PROCEDURE — 99999 PR PBB SHADOW E&M-EST. PATIENT-LVL IV: CPT | Mod: PBBFAC,,, | Performed by: PODIATRIST

## 2021-02-17 PROCEDURE — 99499 UNLISTED E&M SERVICE: CPT | Mod: S$GLB,,, | Performed by: PODIATRIST

## 2021-02-17 PROCEDURE — 1126F PR PAIN SEVERITY QUANTIFIED, NO PAIN PRESENT: ICD-10-PCS | Mod: S$GLB,,, | Performed by: PODIATRIST

## 2021-02-17 PROCEDURE — 99999 PR PBB SHADOW E&M-EST. PATIENT-LVL IV: ICD-10-PCS | Mod: PBBFAC,,, | Performed by: PODIATRIST

## 2021-02-17 PROCEDURE — 99499 NO LOS: ICD-10-PCS | Mod: S$GLB,,, | Performed by: PODIATRIST

## 2021-02-17 PROCEDURE — 11721 DEBRIDE NAIL 6 OR MORE: CPT | Mod: Q9,S$GLB,, | Performed by: PODIATRIST

## 2021-02-17 PROCEDURE — 1126F AMNT PAIN NOTED NONE PRSNT: CPT | Mod: S$GLB,,, | Performed by: PODIATRIST

## 2021-02-17 PROCEDURE — 11721 PR DEBRIDEMENT OF NAILS, 6 OR MORE: ICD-10-PCS | Mod: Q9,S$GLB,, | Performed by: PODIATRIST

## 2021-02-17 PROCEDURE — 3008F BODY MASS INDEX DOCD: CPT | Mod: CPTII,S$GLB,, | Performed by: PODIATRIST

## 2021-02-24 ENCOUNTER — PES CALL (OUTPATIENT)
Dept: ADMINISTRATIVE | Facility: CLINIC | Age: 61
End: 2021-02-24

## 2021-02-25 ENCOUNTER — OFFICE VISIT (OUTPATIENT)
Dept: ORTHOPEDICS | Facility: CLINIC | Age: 61
End: 2021-02-25
Payer: MEDICARE

## 2021-02-25 ENCOUNTER — HOSPITAL ENCOUNTER (OUTPATIENT)
Dept: RADIOLOGY | Facility: HOSPITAL | Age: 61
Discharge: HOME | End: 2021-02-25
Attending: ORTHOPAEDIC SURGERY
Payer: MEDICARE

## 2021-02-25 VITALS — HEIGHT: 63 IN | WEIGHT: 210.13 LBS | BODY MASS INDEX: 37.23 KG/M2

## 2021-02-25 DIAGNOSIS — S62.102D CLOSED FRACTURE OF LEFT WRIST WITH ROUTINE HEALING, SUBSEQUENT ENCOUNTER: Primary | ICD-10-CM

## 2021-02-25 DIAGNOSIS — S62.102D CLOSED FRACTURE OF LEFT WRIST WITH ROUTINE HEALING, SUBSEQUENT ENCOUNTER: ICD-10-CM

## 2021-02-25 PROCEDURE — 73130 X-RAY EXAM OF HAND: CPT | Mod: TC,PN,LT

## 2021-02-25 PROCEDURE — 73130 XR HAND COMPLETE 3 VIEW LEFT: ICD-10-PCS | Mod: 26,LT,, | Performed by: RADIOLOGY

## 2021-02-25 PROCEDURE — 99999 PR PBB SHADOW E&M-EST. PATIENT-LVL V: ICD-10-PCS | Mod: PBBFAC,,, | Performed by: ORTHOPAEDIC SURGERY

## 2021-02-25 PROCEDURE — 3008F BODY MASS INDEX DOCD: CPT | Mod: CPTII,S$GLB,, | Performed by: ORTHOPAEDIC SURGERY

## 2021-02-25 PROCEDURE — 1126F PR PAIN SEVERITY QUANTIFIED, NO PAIN PRESENT: ICD-10-PCS | Mod: S$GLB,,, | Performed by: ORTHOPAEDIC SURGERY

## 2021-02-25 PROCEDURE — 3008F PR BODY MASS INDEX (BMI) DOCUMENTED: ICD-10-PCS | Mod: CPTII,S$GLB,, | Performed by: ORTHOPAEDIC SURGERY

## 2021-02-25 PROCEDURE — 99999 PR PBB SHADOW E&M-EST. PATIENT-LVL V: CPT | Mod: PBBFAC,,, | Performed by: ORTHOPAEDIC SURGERY

## 2021-02-25 PROCEDURE — 99213 PR OFFICE/OUTPT VISIT, EST, LEVL III, 20-29 MIN: ICD-10-PCS | Mod: S$GLB,,, | Performed by: ORTHOPAEDIC SURGERY

## 2021-02-25 PROCEDURE — 73130 X-RAY EXAM OF HAND: CPT | Mod: 26,LT,, | Performed by: RADIOLOGY

## 2021-02-25 PROCEDURE — 99213 OFFICE O/P EST LOW 20 MIN: CPT | Mod: S$GLB,,, | Performed by: ORTHOPAEDIC SURGERY

## 2021-02-25 PROCEDURE — 1126F AMNT PAIN NOTED NONE PRSNT: CPT | Mod: S$GLB,,, | Performed by: ORTHOPAEDIC SURGERY

## 2021-03-01 ENCOUNTER — PATIENT OUTREACH (OUTPATIENT)
Dept: ADMINISTRATIVE | Facility: OTHER | Age: 61
End: 2021-03-01

## 2021-03-03 ENCOUNTER — OFFICE VISIT (OUTPATIENT)
Dept: PHYSICAL MEDICINE AND REHAB | Facility: CLINIC | Age: 61
End: 2021-03-03
Payer: MEDICARE

## 2021-03-03 ENCOUNTER — TELEPHONE (OUTPATIENT)
Dept: PHYSICAL MEDICINE AND REHAB | Facility: CLINIC | Age: 61
End: 2021-03-03

## 2021-03-03 DIAGNOSIS — G81.14 SPASTIC HEMIPARESIS OF LEFT NONDOMINANT SIDE: ICD-10-CM

## 2021-03-03 DIAGNOSIS — G89.29 CHRONIC LEFT SHOULDER PAIN: Primary | ICD-10-CM

## 2021-03-03 DIAGNOSIS — G81.14 SPASTIC HEMIPARESIS OF LEFT NONDOMINANT SIDE: Primary | ICD-10-CM

## 2021-03-03 DIAGNOSIS — M25.512 CHRONIC LEFT SHOULDER PAIN: Primary | ICD-10-CM

## 2021-03-03 PROCEDURE — 99999 PR PBB SHADOW E&M-EST. PATIENT-LVL I: CPT | Mod: PBBFAC,,, | Performed by: PHYSICAL MEDICINE & REHABILITATION

## 2021-03-03 PROCEDURE — 96372 PR INJECTION,THERAP/PROPH/DIAG2ST, IM OR SUBCUT: ICD-10-PCS | Mod: S$GLB,,, | Performed by: PHYSICAL MEDICINE & REHABILITATION

## 2021-03-03 PROCEDURE — 99214 OFFICE O/P EST MOD 30 MIN: CPT | Mod: 25,S$GLB,, | Performed by: PHYSICAL MEDICINE & REHABILITATION

## 2021-03-03 PROCEDURE — 99999 PR PBB SHADOW E&M-EST. PATIENT-LVL I: ICD-10-PCS | Mod: PBBFAC,,, | Performed by: PHYSICAL MEDICINE & REHABILITATION

## 2021-03-03 PROCEDURE — 96372 THER/PROPH/DIAG INJ SC/IM: CPT | Mod: S$GLB,,, | Performed by: PHYSICAL MEDICINE & REHABILITATION

## 2021-03-03 PROCEDURE — 99214 PR OFFICE/OUTPT VISIT, EST, LEVL IV, 30-39 MIN: ICD-10-PCS | Mod: 25,S$GLB,, | Performed by: PHYSICAL MEDICINE & REHABILITATION

## 2021-03-03 RX ORDER — TRIAMCINOLONE ACETONIDE 40 MG/ML
40 INJECTION, SUSPENSION INTRA-ARTICULAR; INTRAMUSCULAR
Status: DISCONTINUED | OUTPATIENT
Start: 2021-03-03 | End: 2021-03-03 | Stop reason: HOSPADM

## 2021-03-03 RX ADMIN — TRIAMCINOLONE ACETONIDE 40 MG: 40 INJECTION, SUSPENSION INTRA-ARTICULAR; INTRAMUSCULAR at 03:03

## 2021-03-16 ENCOUNTER — OFFICE VISIT (OUTPATIENT)
Dept: INTERNAL MEDICINE | Facility: CLINIC | Age: 61
End: 2021-03-16
Payer: MEDICARE

## 2021-03-16 VITALS
BODY MASS INDEX: 36.4 KG/M2 | OXYGEN SATURATION: 96 % | RESPIRATION RATE: 15 BRPM | WEIGHT: 213.19 LBS | DIASTOLIC BLOOD PRESSURE: 60 MMHG | HEART RATE: 63 BPM | SYSTOLIC BLOOD PRESSURE: 110 MMHG | HEIGHT: 64 IN

## 2021-03-16 DIAGNOSIS — I73.9 PVD (PERIPHERAL VASCULAR DISEASE): ICD-10-CM

## 2021-03-16 DIAGNOSIS — T82.898S PROBLEM WITH DIALYSIS ACCESS, SEQUELA: ICD-10-CM

## 2021-03-16 DIAGNOSIS — G40.909 SEIZURE DISORDER: ICD-10-CM

## 2021-03-16 DIAGNOSIS — G89.0 CENTRAL PAIN SYNDROME: ICD-10-CM

## 2021-03-16 DIAGNOSIS — N18.6 ESRD (END STAGE RENAL DISEASE): ICD-10-CM

## 2021-03-16 DIAGNOSIS — M79.89 SWELLING OF RIGHT UPPER EXTREMITY: ICD-10-CM

## 2021-03-16 DIAGNOSIS — R54 FRAIL ELDERLY: ICD-10-CM

## 2021-03-16 DIAGNOSIS — G40.209 PARTIAL SYMPTOMATIC EPILEPSY WITH COMPLEX PARTIAL SEIZURES, NOT INTRACTABLE, WITHOUT STATUS EPILEPTICUS: ICD-10-CM

## 2021-03-16 DIAGNOSIS — E78.5 DYSLIPIDEMIA, GOAL LDL BELOW 70: ICD-10-CM

## 2021-03-16 DIAGNOSIS — Z11.4 SCREENING FOR HIV (HUMAN IMMUNODEFICIENCY VIRUS): ICD-10-CM

## 2021-03-16 DIAGNOSIS — N18.6 ESRD (END STAGE RENAL DISEASE) ON DIALYSIS: ICD-10-CM

## 2021-03-16 DIAGNOSIS — I10 ESSENTIAL HYPERTENSION: ICD-10-CM

## 2021-03-16 DIAGNOSIS — Z78.9 PROBLEM WITH VASCULAR ACCESS: ICD-10-CM

## 2021-03-16 DIAGNOSIS — Z74.09 IMPAIRED MOBILITY AND ADLS: ICD-10-CM

## 2021-03-16 DIAGNOSIS — I69.359 HEMIPLEGIA AS LATE EFFECT OF STROKE: ICD-10-CM

## 2021-03-16 DIAGNOSIS — E66.01 SEVERE OBESITY (BMI 35.0-35.9 WITH COMORBIDITY): ICD-10-CM

## 2021-03-16 DIAGNOSIS — Z99.3 DEPENDENCE ON WHEELCHAIR: ICD-10-CM

## 2021-03-16 DIAGNOSIS — N18.6 ANEMIA IN ESRD (END-STAGE RENAL DISEASE): ICD-10-CM

## 2021-03-16 DIAGNOSIS — I70.0 ATHEROSCLEROSIS OF AORTA: ICD-10-CM

## 2021-03-16 DIAGNOSIS — J96.01 ACUTE RESPIRATORY FAILURE WITH HYPOXIA: ICD-10-CM

## 2021-03-16 DIAGNOSIS — G81.94 LEFT HEMIPARESIS: ICD-10-CM

## 2021-03-16 DIAGNOSIS — T82.868S THROMBOSIS OF ARTERIOVENOUS GRAFT, SEQUELA: ICD-10-CM

## 2021-03-16 DIAGNOSIS — E66.9 TYPE 2 DIABETES MELLITUS WITH OBESITY: ICD-10-CM

## 2021-03-16 DIAGNOSIS — E53.8 FOLIC ACID DEFICIENCY: ICD-10-CM

## 2021-03-16 DIAGNOSIS — K59.01 SLOW TRANSIT CONSTIPATION: ICD-10-CM

## 2021-03-16 DIAGNOSIS — M85.89 OTHER SPECIFIED DISORDERS OF BONE DENSITY AND STRUCTURE, MULTIPLE SITES: ICD-10-CM

## 2021-03-16 DIAGNOSIS — I25.10 CORONARY ARTERY CALCIFICATION SEEN ON CAT SCAN: ICD-10-CM

## 2021-03-16 DIAGNOSIS — T82.590D AV GRAFT MALFUNCTION, SUBSEQUENT ENCOUNTER: ICD-10-CM

## 2021-03-16 DIAGNOSIS — Z86.73 HISTORY OF STROKE: ICD-10-CM

## 2021-03-16 DIAGNOSIS — K43.9 HERNIA OF ABDOMINAL WALL: ICD-10-CM

## 2021-03-16 DIAGNOSIS — Z91.81 RISK FOR FALLS: ICD-10-CM

## 2021-03-16 DIAGNOSIS — F41.8 MIXED ANXIETY AND DEPRESSIVE DISORDER: ICD-10-CM

## 2021-03-16 DIAGNOSIS — Z00.00 ENCOUNTER FOR PREVENTIVE HEALTH EXAMINATION: Primary | ICD-10-CM

## 2021-03-16 DIAGNOSIS — E11.69 TYPE 2 DIABETES MELLITUS WITH OBESITY: ICD-10-CM

## 2021-03-16 DIAGNOSIS — K80.20 GALL STONES: ICD-10-CM

## 2021-03-16 DIAGNOSIS — H61.23 BILATERAL IMPACTED CERUMEN: ICD-10-CM

## 2021-03-16 DIAGNOSIS — R26.9 ABNORMALITY OF GAIT AND MOBILITY: ICD-10-CM

## 2021-03-16 DIAGNOSIS — R00.1 BRADYCARDIA: ICD-10-CM

## 2021-03-16 DIAGNOSIS — F33.0 MILD EPISODE OF RECURRENT MAJOR DEPRESSIVE DISORDER: ICD-10-CM

## 2021-03-16 DIAGNOSIS — N18.6 ESRD ON PERITONEAL DIALYSIS: ICD-10-CM

## 2021-03-16 DIAGNOSIS — I50.32 CHRONIC DIASTOLIC HEART FAILURE: ICD-10-CM

## 2021-03-16 DIAGNOSIS — Z78.9 IMPAIRED MOBILITY AND ADLS: ICD-10-CM

## 2021-03-16 DIAGNOSIS — Z99.2 ESRD (END STAGE RENAL DISEASE) ON DIALYSIS: ICD-10-CM

## 2021-03-16 DIAGNOSIS — I27.20 PULMONARY HYPERTENSION: ICD-10-CM

## 2021-03-16 DIAGNOSIS — G47.33 OSA (OBSTRUCTIVE SLEEP APNEA): ICD-10-CM

## 2021-03-16 DIAGNOSIS — D46.4 ANEMIA, REFRACTORY: ICD-10-CM

## 2021-03-16 DIAGNOSIS — M06.9 RHEUMATOID ARTHRITIS INVOLVING MULTIPLE SITES, UNSPECIFIED WHETHER RHEUMATOID FACTOR PRESENT: ICD-10-CM

## 2021-03-16 DIAGNOSIS — Z59.9 FINANCIAL DIFFICULTIES: ICD-10-CM

## 2021-03-16 DIAGNOSIS — N25.81 SECONDARY HYPERPARATHYROIDISM OF RENAL ORIGIN: ICD-10-CM

## 2021-03-16 DIAGNOSIS — Z99.2 ESRD ON PERITONEAL DIALYSIS: ICD-10-CM

## 2021-03-16 DIAGNOSIS — Z13.5 SCREENING FOR EYE CONDITION: ICD-10-CM

## 2021-03-16 DIAGNOSIS — D86.9 SARCOIDOSIS: Chronic | ICD-10-CM

## 2021-03-16 DIAGNOSIS — D63.1 ANEMIA IN ESRD (END-STAGE RENAL DISEASE): ICD-10-CM

## 2021-03-16 DIAGNOSIS — Z99.81 DEPENDENCE ON SUPPLEMENTAL OXYGEN: ICD-10-CM

## 2021-03-16 DIAGNOSIS — E55.9 VITAMIN D DEFICIENCY: ICD-10-CM

## 2021-03-16 DIAGNOSIS — R09.02 HYPOXEMIA: ICD-10-CM

## 2021-03-16 DIAGNOSIS — Z13.820 SCREENING FOR OSTEOPOROSIS: ICD-10-CM

## 2021-03-16 DIAGNOSIS — K80.70 CALCULUS OF GALLBLADDER AND BILE DUCT WITHOUT CHOLECYSTITIS OR OBSTRUCTION: ICD-10-CM

## 2021-03-16 DIAGNOSIS — I65.23 BILATERAL CAROTID ARTERY STENOSIS: ICD-10-CM

## 2021-03-16 DIAGNOSIS — R53.83 FATIGUE, UNSPECIFIED TYPE: ICD-10-CM

## 2021-03-16 DIAGNOSIS — Z74.1 REQUIRES DAILY ASSISTANCE FOR ACTIVITIES OF DAILY LIVING (ADL) AND COMFORT NEEDS: ICD-10-CM

## 2021-03-16 DIAGNOSIS — J44.9 MODERATE COPD (CHRONIC OBSTRUCTIVE PULMONARY DISEASE): ICD-10-CM

## 2021-03-16 PROBLEM — Z01.818 PRE-OP EVALUATION: Status: RESOLVED | Noted: 2020-06-10 | Resolved: 2021-03-16

## 2021-03-16 PROBLEM — Z12.12 SCREENING FOR COLORECTAL CANCER: Status: RESOLVED | Noted: 2017-12-06 | Resolved: 2021-03-16

## 2021-03-16 PROBLEM — Z13.31 POSITIVE DEPRESSION SCREENING: Status: RESOLVED | Noted: 2019-01-11 | Resolved: 2021-03-16

## 2021-03-16 PROBLEM — Z12.11 SCREENING FOR COLORECTAL CANCER: Status: RESOLVED | Noted: 2017-12-06 | Resolved: 2021-03-16

## 2021-03-16 PROBLEM — R07.9 CHEST PAIN: Status: RESOLVED | Noted: 2020-07-27 | Resolved: 2021-03-16

## 2021-03-16 PROCEDURE — 99999 PR PBB SHADOW E&M-EST. PATIENT-LVL V: CPT | Mod: PBBFAC,,, | Performed by: NURSE PRACTITIONER

## 2021-03-16 PROCEDURE — 99499 RISK ADDL DX/OHS AUDIT: ICD-10-PCS | Mod: S$GLB,,, | Performed by: NURSE PRACTITIONER

## 2021-03-16 PROCEDURE — 3046F PR MOST RECENT HEMOGLOBIN A1C LEVEL > 9.0%: ICD-10-PCS | Mod: CPTII,S$GLB,, | Performed by: NURSE PRACTITIONER

## 2021-03-16 PROCEDURE — 3008F PR BODY MASS INDEX (BMI) DOCUMENTED: ICD-10-PCS | Mod: CPTII,S$GLB,, | Performed by: NURSE PRACTITIONER

## 2021-03-16 PROCEDURE — G0439 PPPS, SUBSEQ VISIT: HCPCS | Mod: S$GLB,,, | Performed by: NURSE PRACTITIONER

## 2021-03-16 PROCEDURE — 99499 UNLISTED E&M SERVICE: CPT | Mod: S$GLB,,, | Performed by: NURSE PRACTITIONER

## 2021-03-16 PROCEDURE — 1126F AMNT PAIN NOTED NONE PRSNT: CPT | Mod: S$GLB,,, | Performed by: NURSE PRACTITIONER

## 2021-03-16 PROCEDURE — 3046F HEMOGLOBIN A1C LEVEL >9.0%: CPT | Mod: CPTII,S$GLB,, | Performed by: NURSE PRACTITIONER

## 2021-03-16 PROCEDURE — 99999 PR PBB SHADOW E&M-EST. PATIENT-LVL V: ICD-10-PCS | Mod: PBBFAC,,, | Performed by: NURSE PRACTITIONER

## 2021-03-16 PROCEDURE — 3008F BODY MASS INDEX DOCD: CPT | Mod: CPTII,S$GLB,, | Performed by: NURSE PRACTITIONER

## 2021-03-16 PROCEDURE — 1126F PR PAIN SEVERITY QUANTIFIED, NO PAIN PRESENT: ICD-10-PCS | Mod: S$GLB,,, | Performed by: NURSE PRACTITIONER

## 2021-03-16 PROCEDURE — G0439 PR MEDICARE ANNUAL WELLNESS SUBSEQUENT VISIT: ICD-10-PCS | Mod: S$GLB,,, | Performed by: NURSE PRACTITIONER

## 2021-03-16 SDOH — SOCIAL DETERMINANTS OF HEALTH (SDOH): PROBLEM RELATED TO HOUSING AND ECONOMIC CIRCUMSTANCES, UNSPECIFIED: Z59.9

## 2021-03-18 ENCOUNTER — PATIENT MESSAGE (OUTPATIENT)
Dept: RESEARCH | Facility: HOSPITAL | Age: 61
End: 2021-03-18

## 2021-03-21 ENCOUNTER — TELEPHONE (OUTPATIENT)
Dept: INTERNAL MEDICINE | Facility: CLINIC | Age: 61
End: 2021-03-21

## 2021-03-21 DIAGNOSIS — I10 ESSENTIAL HYPERTENSION: ICD-10-CM

## 2021-03-21 DIAGNOSIS — E11.65 UNCONTROLLED TYPE 2 DIABETES MELLITUS WITH HYPERGLYCEMIA: Primary | ICD-10-CM

## 2021-03-21 DIAGNOSIS — E78.5 HYPERLIPIDEMIA, UNSPECIFIED HYPERLIPIDEMIA TYPE: ICD-10-CM

## 2021-03-21 DIAGNOSIS — Z79.899 HIGH RISK MEDICATION USE: ICD-10-CM

## 2021-03-25 ENCOUNTER — OUTPATIENT CASE MANAGEMENT (OUTPATIENT)
Dept: ADMINISTRATIVE | Facility: OTHER | Age: 61
End: 2021-03-25

## 2021-03-26 ENCOUNTER — PATIENT MESSAGE (OUTPATIENT)
Dept: RESEARCH | Facility: HOSPITAL | Age: 61
End: 2021-03-26

## 2021-04-01 ENCOUNTER — TELEPHONE (OUTPATIENT)
Dept: INTERNAL MEDICINE | Facility: CLINIC | Age: 61
End: 2021-04-01

## 2021-04-09 ENCOUNTER — OUTPATIENT CASE MANAGEMENT (OUTPATIENT)
Dept: ADMINISTRATIVE | Facility: OTHER | Age: 61
End: 2021-04-09

## 2021-04-09 ENCOUNTER — TELEPHONE (OUTPATIENT)
Dept: INTERNAL MEDICINE | Facility: CLINIC | Age: 61
End: 2021-04-09

## 2021-04-09 DIAGNOSIS — S62.92XD CLOSED FRACTURE OF LEFT HAND WITH ROUTINE HEALING, SUBSEQUENT ENCOUNTER: ICD-10-CM

## 2021-04-09 DIAGNOSIS — I63.9 CEREBROVASCULAR ACCIDENT (CVA), UNSPECIFIED MECHANISM: Primary | ICD-10-CM

## 2021-04-12 ENCOUNTER — HOME CARE VISIT (OUTPATIENT)
Dept: INTERNAL MEDICINE | Facility: CLINIC | Age: 61
End: 2021-04-12

## 2021-04-14 PROCEDURE — G0180 PR HOME HEALTH MD CERTIFICATION: ICD-10-PCS | Mod: ,,, | Performed by: INTERNAL MEDICINE

## 2021-04-14 PROCEDURE — G0180 MD CERTIFICATION HHA PATIENT: HCPCS | Mod: ,,, | Performed by: INTERNAL MEDICINE

## 2021-04-15 ENCOUNTER — TELEPHONE (OUTPATIENT)
Dept: INTERNAL MEDICINE | Facility: CLINIC | Age: 61
End: 2021-04-15

## 2021-04-15 DIAGNOSIS — G47.33 OSA (OBSTRUCTIVE SLEEP APNEA): Primary | ICD-10-CM

## 2021-04-16 ENCOUNTER — OUTPATIENT CASE MANAGEMENT (OUTPATIENT)
Dept: ADMINISTRATIVE | Facility: OTHER | Age: 61
End: 2021-04-16

## 2021-04-18 ENCOUNTER — TELEPHONE (OUTPATIENT)
Dept: INTERNAL MEDICINE | Facility: CLINIC | Age: 61
End: 2021-04-18

## 2021-04-19 ENCOUNTER — TELEPHONE (OUTPATIENT)
Dept: PHARMACY | Facility: CLINIC | Age: 61
End: 2021-04-19

## 2021-04-19 ENCOUNTER — TELEPHONE (OUTPATIENT)
Dept: INTERNAL MEDICINE | Facility: CLINIC | Age: 61
End: 2021-04-19

## 2021-04-19 ENCOUNTER — OUTPATIENT CASE MANAGEMENT (OUTPATIENT)
Dept: ADMINISTRATIVE | Facility: OTHER | Age: 61
End: 2021-04-19

## 2021-04-26 ENCOUNTER — EXTERNAL HOME HEALTH (OUTPATIENT)
Dept: HOME HEALTH SERVICES | Facility: HOSPITAL | Age: 61
End: 2021-04-26
Payer: MEDICARE

## 2021-04-27 ENCOUNTER — PATIENT OUTREACH (OUTPATIENT)
Dept: ADMINISTRATIVE | Facility: OTHER | Age: 61
End: 2021-04-27

## 2021-04-29 ENCOUNTER — PATIENT OUTREACH (OUTPATIENT)
Dept: ADMINISTRATIVE | Facility: HOSPITAL | Age: 61
End: 2021-04-29

## 2021-04-29 ENCOUNTER — PATIENT MESSAGE (OUTPATIENT)
Dept: ADMINISTRATIVE | Facility: HOSPITAL | Age: 61
End: 2021-04-29

## 2021-04-30 ENCOUNTER — DOCUMENT SCAN (OUTPATIENT)
Dept: HOME HEALTH SERVICES | Facility: HOSPITAL | Age: 61
End: 2021-04-30
Payer: MEDICARE

## 2021-05-04 ENCOUNTER — OUTPATIENT CASE MANAGEMENT (OUTPATIENT)
Dept: ADMINISTRATIVE | Facility: OTHER | Age: 61
End: 2021-05-04

## 2021-05-12 ENCOUNTER — OUTPATIENT CASE MANAGEMENT (OUTPATIENT)
Dept: ADMINISTRATIVE | Facility: OTHER | Age: 61
End: 2021-05-12

## 2021-05-13 ENCOUNTER — TELEPHONE (OUTPATIENT)
Dept: INTERNAL MEDICINE | Facility: CLINIC | Age: 61
End: 2021-05-13

## 2021-05-20 ENCOUNTER — OUTPATIENT CASE MANAGEMENT (OUTPATIENT)
Dept: ADMINISTRATIVE | Facility: OTHER | Age: 61
End: 2021-05-20

## 2021-05-20 ENCOUNTER — TELEPHONE (OUTPATIENT)
Dept: INTERNAL MEDICINE | Facility: CLINIC | Age: 61
End: 2021-05-20
Payer: MEDICARE

## 2021-05-20 DIAGNOSIS — E11.65 UNCONTROLLED TYPE 2 DIABETES MELLITUS WITH HYPERGLYCEMIA: Primary | ICD-10-CM

## 2021-05-20 DIAGNOSIS — E55.9 VITAMIN D DEFICIENCY: ICD-10-CM

## 2021-05-20 DIAGNOSIS — I10 ESSENTIAL HYPERTENSION: ICD-10-CM

## 2021-05-24 ENCOUNTER — TELEPHONE (OUTPATIENT)
Dept: INTERNAL MEDICINE | Facility: CLINIC | Age: 61
End: 2021-05-24

## 2021-05-25 ENCOUNTER — TELEPHONE (OUTPATIENT)
Dept: PODIATRY | Facility: CLINIC | Age: 61
End: 2021-05-25

## 2021-05-25 ENCOUNTER — PATIENT OUTREACH (OUTPATIENT)
Dept: ADMINISTRATIVE | Facility: HOSPITAL | Age: 61
End: 2021-05-25

## 2021-05-25 ENCOUNTER — PATIENT MESSAGE (OUTPATIENT)
Dept: ADMINISTRATIVE | Facility: HOSPITAL | Age: 61
End: 2021-05-25

## 2021-05-25 DIAGNOSIS — E55.9 VITAMIN D DEFICIENCY: ICD-10-CM

## 2021-05-25 RX ORDER — ERGOCALCIFEROL 1.25 MG/1
CAPSULE ORAL
Qty: 12 CAPSULE | Refills: 0 | Status: SHIPPED | OUTPATIENT
Start: 2021-05-25

## 2021-05-26 ENCOUNTER — OFFICE VISIT (OUTPATIENT)
Dept: PODIATRY | Facility: CLINIC | Age: 61
End: 2021-05-26
Payer: MEDICARE

## 2021-05-26 VITALS — BODY MASS INDEX: 36.59 KG/M2 | HEIGHT: 64 IN

## 2021-05-26 DIAGNOSIS — E11.49 TYPE II DIABETES MELLITUS WITH NEUROLOGICAL MANIFESTATIONS: Primary | ICD-10-CM

## 2021-05-26 DIAGNOSIS — M06.9 RHEUMATOID ARTHRITIS INVOLVING MULTIPLE SITES, UNSPECIFIED WHETHER RHEUMATOID FACTOR PRESENT: ICD-10-CM

## 2021-05-26 DIAGNOSIS — N18.6 ESRD (END STAGE RENAL DISEASE) ON DIALYSIS: ICD-10-CM

## 2021-05-26 DIAGNOSIS — Z86.73 HISTORY OF STROKE: ICD-10-CM

## 2021-05-26 DIAGNOSIS — Z99.2 ESRD (END STAGE RENAL DISEASE) ON DIALYSIS: ICD-10-CM

## 2021-05-26 DIAGNOSIS — I73.9 PVD (PERIPHERAL VASCULAR DISEASE): ICD-10-CM

## 2021-05-26 PROCEDURE — 3008F BODY MASS INDEX DOCD: CPT | Mod: CPTII,S$GLB,, | Performed by: PODIATRIST

## 2021-05-26 PROCEDURE — 99999 PR PBB SHADOW E&M-EST. PATIENT-LVL IV: CPT | Mod: PBBFAC,,, | Performed by: PODIATRIST

## 2021-05-26 PROCEDURE — 3008F PR BODY MASS INDEX (BMI) DOCUMENTED: ICD-10-PCS | Mod: CPTII,S$GLB,, | Performed by: PODIATRIST

## 2021-05-26 PROCEDURE — 99999 PR PBB SHADOW E&M-EST. PATIENT-LVL IV: ICD-10-PCS | Mod: PBBFAC,,, | Performed by: PODIATRIST

## 2021-05-26 PROCEDURE — 1125F PR PAIN SEVERITY QUANTIFIED, PAIN PRESENT: ICD-10-PCS | Mod: CPTII,S$GLB,, | Performed by: PODIATRIST

## 2021-05-26 PROCEDURE — 1125F AMNT PAIN NOTED PAIN PRSNT: CPT | Mod: CPTII,S$GLB,, | Performed by: PODIATRIST

## 2021-05-26 PROCEDURE — 99213 OFFICE O/P EST LOW 20 MIN: CPT | Mod: 25,S$GLB,, | Performed by: PODIATRIST

## 2021-05-26 PROCEDURE — 99213 PR OFFICE/OUTPT VISIT, EST, LEVL III, 20-29 MIN: ICD-10-PCS | Mod: 25,S$GLB,, | Performed by: PODIATRIST

## 2021-05-31 DIAGNOSIS — I73.9 PVD (PERIPHERAL VASCULAR DISEASE): Primary | ICD-10-CM

## 2021-06-02 ENCOUNTER — CARE AT HOME (OUTPATIENT)
Dept: HOME HEALTH SERVICES | Facility: CLINIC | Age: 61
End: 2021-06-02
Payer: MEDICARE

## 2021-06-02 VITALS
OXYGEN SATURATION: 96 % | HEART RATE: 80 BPM | SYSTOLIC BLOOD PRESSURE: 130 MMHG | RESPIRATION RATE: 20 BRPM | TEMPERATURE: 98 F | DIASTOLIC BLOOD PRESSURE: 60 MMHG

## 2021-06-02 DIAGNOSIS — K59.01 SLOW TRANSIT CONSTIPATION: ICD-10-CM

## 2021-06-02 DIAGNOSIS — I10 ESSENTIAL HYPERTENSION: ICD-10-CM

## 2021-06-02 DIAGNOSIS — I65.23 BILATERAL CAROTID ARTERY STENOSIS: ICD-10-CM

## 2021-06-02 DIAGNOSIS — E11.69 TYPE 2 DIABETES MELLITUS WITH OBESITY: ICD-10-CM

## 2021-06-02 DIAGNOSIS — I50.32 CHRONIC DIASTOLIC HEART FAILURE: ICD-10-CM

## 2021-06-02 DIAGNOSIS — G40.209 PARTIAL SYMPTOMATIC EPILEPSY WITH COMPLEX PARTIAL SEIZURES, NOT INTRACTABLE, WITHOUT STATUS EPILEPTICUS: ICD-10-CM

## 2021-06-02 DIAGNOSIS — J44.9 MODERATE COPD (CHRONIC OBSTRUCTIVE PULMONARY DISEASE): ICD-10-CM

## 2021-06-02 DIAGNOSIS — Z99.2 ESRD ON PERITONEAL DIALYSIS: Primary | ICD-10-CM

## 2021-06-02 DIAGNOSIS — N18.6 ANEMIA IN ESRD (END-STAGE RENAL DISEASE): ICD-10-CM

## 2021-06-02 DIAGNOSIS — Z74.09 IMPAIRED MOBILITY AND ADLS: ICD-10-CM

## 2021-06-02 DIAGNOSIS — Z78.9 IMPAIRED MOBILITY AND ADLS: ICD-10-CM

## 2021-06-02 DIAGNOSIS — N25.81 SECONDARY HYPERPARATHYROIDISM OF RENAL ORIGIN: ICD-10-CM

## 2021-06-02 DIAGNOSIS — G47.33 OSA (OBSTRUCTIVE SLEEP APNEA): ICD-10-CM

## 2021-06-02 DIAGNOSIS — I73.9 PVD (PERIPHERAL VASCULAR DISEASE): ICD-10-CM

## 2021-06-02 DIAGNOSIS — N18.6 ESRD ON PERITONEAL DIALYSIS: Primary | ICD-10-CM

## 2021-06-02 DIAGNOSIS — D63.1 ANEMIA IN ESRD (END-STAGE RENAL DISEASE): ICD-10-CM

## 2021-06-02 DIAGNOSIS — I70.0 ATHEROSCLEROSIS OF AORTA: ICD-10-CM

## 2021-06-02 DIAGNOSIS — I61.9 HEMORRHAGIC CEREBROVASCULAR ACCIDENT (CVA): ICD-10-CM

## 2021-06-02 DIAGNOSIS — E66.9 TYPE 2 DIABETES MELLITUS WITH OBESITY: ICD-10-CM

## 2021-06-02 PROCEDURE — 99350 PR HOME VISIT,ESTAB PATIENT,LEVEL IV: ICD-10-PCS | Mod: S$GLB,,, | Performed by: NURSE PRACTITIONER

## 2021-06-02 PROCEDURE — 99497 ADVNCD CARE PLAN 30 MIN: CPT | Mod: S$GLB,,, | Performed by: NURSE PRACTITIONER

## 2021-06-02 PROCEDURE — 99497 PR ADVNCD CARE PLAN 30 MIN: ICD-10-PCS | Mod: S$GLB,,, | Performed by: NURSE PRACTITIONER

## 2021-06-02 PROCEDURE — 99350 HOME/RES VST EST HIGH MDM 60: CPT | Mod: S$GLB,,, | Performed by: NURSE PRACTITIONER

## 2021-06-03 ENCOUNTER — PATIENT MESSAGE (OUTPATIENT)
Dept: ADMINISTRATIVE | Facility: OTHER | Age: 61
End: 2021-06-03

## 2021-06-04 ENCOUNTER — OUTPATIENT CASE MANAGEMENT (OUTPATIENT)
Dept: ADMINISTRATIVE | Facility: OTHER | Age: 61
End: 2021-06-04

## 2021-06-07 ENCOUNTER — PATIENT OUTREACH (OUTPATIENT)
Dept: ADMINISTRATIVE | Facility: OTHER | Age: 61
End: 2021-06-07

## 2021-06-09 ENCOUNTER — OFFICE VISIT (OUTPATIENT)
Dept: VASCULAR SURGERY | Facility: CLINIC | Age: 61
End: 2021-06-09
Payer: MEDICARE

## 2021-06-09 ENCOUNTER — HOSPITAL ENCOUNTER (OUTPATIENT)
Dept: VASCULAR SURGERY | Facility: CLINIC | Age: 61
Discharge: HOME | End: 2021-06-09
Attending: SURGERY
Payer: MEDICARE

## 2021-06-09 VITALS
TEMPERATURE: 98 F | DIASTOLIC BLOOD PRESSURE: 88 MMHG | WEIGHT: 209.44 LBS | HEIGHT: 63 IN | BODY MASS INDEX: 37.11 KG/M2 | HEART RATE: 84 BPM | SYSTOLIC BLOOD PRESSURE: 135 MMHG

## 2021-06-09 DIAGNOSIS — I73.9 PERIPHERAL VASCULAR DISEASE: Primary | ICD-10-CM

## 2021-06-09 DIAGNOSIS — I73.9 PVD (PERIPHERAL VASCULAR DISEASE): ICD-10-CM

## 2021-06-09 PROCEDURE — 99999 PR PBB SHADOW E&M-EST. PATIENT-LVL IV: ICD-10-PCS | Mod: PBBFAC,,, | Performed by: SURGERY

## 2021-06-09 PROCEDURE — 93923 PR NON-INVASIVE PHYSIOLOGIC STUDY EXTREMITY 3 LEVELS: ICD-10-PCS | Mod: S$GLB,,, | Performed by: SURGERY

## 2021-06-09 PROCEDURE — 93923 UPR/LXTR ART STDY 3+ LVLS: CPT | Mod: S$GLB,,, | Performed by: SURGERY

## 2021-06-09 PROCEDURE — 1125F AMNT PAIN NOTED PAIN PRSNT: CPT | Mod: S$GLB,,, | Performed by: SURGERY

## 2021-06-09 PROCEDURE — 1125F PR PAIN SEVERITY QUANTIFIED, PAIN PRESENT: ICD-10-PCS | Mod: S$GLB,,, | Performed by: SURGERY

## 2021-06-09 PROCEDURE — 99214 OFFICE O/P EST MOD 30 MIN: CPT | Mod: S$GLB,,, | Performed by: SURGERY

## 2021-06-09 PROCEDURE — 99214 PR OFFICE/OUTPT VISIT, EST, LEVL IV, 30-39 MIN: ICD-10-PCS | Mod: S$GLB,,, | Performed by: SURGERY

## 2021-06-09 PROCEDURE — 3008F PR BODY MASS INDEX (BMI) DOCUMENTED: ICD-10-PCS | Mod: CPTII,S$GLB,, | Performed by: SURGERY

## 2021-06-09 PROCEDURE — 3008F BODY MASS INDEX DOCD: CPT | Mod: CPTII,S$GLB,, | Performed by: SURGERY

## 2021-06-09 PROCEDURE — 99999 PR PBB SHADOW E&M-EST. PATIENT-LVL IV: CPT | Mod: PBBFAC,,, | Performed by: SURGERY

## 2021-06-09 RX ORDER — TRIAMCINOLONE ACETONIDE 40 MG/ML
INJECTION, SUSPENSION INTRA-ARTICULAR; INTRAMUSCULAR
COMMUNITY
Start: 2021-03-03

## 2021-06-10 PROBLEM — N18.6 ESRD (END STAGE RENAL DISEASE): Status: RESOLVED | Noted: 2020-03-09 | Resolved: 2021-06-10

## 2021-06-13 PROCEDURE — G0179 MD RECERTIFICATION HHA PT: HCPCS | Mod: ,,, | Performed by: INTERNAL MEDICINE

## 2021-06-13 PROCEDURE — G0179 PR HOME HEALTH MD RECERTIFICATION: ICD-10-PCS | Mod: ,,, | Performed by: INTERNAL MEDICINE

## 2021-06-21 ENCOUNTER — OUTPATIENT CASE MANAGEMENT (OUTPATIENT)
Dept: ADMINISTRATIVE | Facility: OTHER | Age: 61
End: 2021-06-21

## 2021-06-22 ENCOUNTER — OFFICE VISIT (OUTPATIENT)
Dept: PODIATRY | Facility: CLINIC | Age: 61
End: 2021-06-22
Payer: MEDICARE

## 2021-06-22 VITALS
WEIGHT: 209 LBS | BODY MASS INDEX: 37.03 KG/M2 | HEART RATE: 84 BPM | SYSTOLIC BLOOD PRESSURE: 142 MMHG | HEIGHT: 63 IN | RESPIRATION RATE: 18 BRPM | DIASTOLIC BLOOD PRESSURE: 75 MMHG

## 2021-06-22 DIAGNOSIS — E11.49 OTHER DIABETIC NEUROLOGICAL COMPLICATION ASSOCIATED WITH TYPE 2 DIABETES MELLITUS: ICD-10-CM

## 2021-06-22 DIAGNOSIS — E11.49 TYPE II DIABETES MELLITUS WITH NEUROLOGICAL MANIFESTATIONS: Primary | ICD-10-CM

## 2021-06-22 DIAGNOSIS — I73.9 PVD (PERIPHERAL VASCULAR DISEASE): ICD-10-CM

## 2021-06-22 DIAGNOSIS — Z99.2 ESRD (END STAGE RENAL DISEASE) ON DIALYSIS: ICD-10-CM

## 2021-06-22 DIAGNOSIS — N18.6 ESRD (END STAGE RENAL DISEASE) ON DIALYSIS: ICD-10-CM

## 2021-06-22 PROCEDURE — 3008F BODY MASS INDEX DOCD: CPT | Mod: CPTII,S$GLB,, | Performed by: PODIATRIST

## 2021-06-22 PROCEDURE — 99999 PR PBB SHADOW E&M-EST. PATIENT-LVL IV: ICD-10-PCS | Mod: PBBFAC,,, | Performed by: PODIATRIST

## 2021-06-22 PROCEDURE — 99214 PR OFFICE/OUTPT VISIT, EST, LEVL IV, 30-39 MIN: ICD-10-PCS | Mod: S$GLB,,, | Performed by: PODIATRIST

## 2021-06-22 PROCEDURE — 1125F PR PAIN SEVERITY QUANTIFIED, PAIN PRESENT: ICD-10-PCS | Mod: S$GLB,,, | Performed by: PODIATRIST

## 2021-06-22 PROCEDURE — 1125F AMNT PAIN NOTED PAIN PRSNT: CPT | Mod: S$GLB,,, | Performed by: PODIATRIST

## 2021-06-22 PROCEDURE — 99214 OFFICE O/P EST MOD 30 MIN: CPT | Mod: S$GLB,,, | Performed by: PODIATRIST

## 2021-06-22 PROCEDURE — 99999 PR PBB SHADOW E&M-EST. PATIENT-LVL IV: CPT | Mod: PBBFAC,,, | Performed by: PODIATRIST

## 2021-06-22 PROCEDURE — 3008F PR BODY MASS INDEX (BMI) DOCUMENTED: ICD-10-PCS | Mod: CPTII,S$GLB,, | Performed by: PODIATRIST

## 2021-06-23 ENCOUNTER — EXTERNAL HOME HEALTH (OUTPATIENT)
Dept: HOME HEALTH SERVICES | Facility: HOSPITAL | Age: 61
End: 2021-06-23
Payer: MEDICARE

## 2021-06-25 RX ORDER — UREA 200 MG/G
1 CREAM TOPICAL DAILY
Qty: 113 G | Refills: 3 | Status: SHIPPED | OUTPATIENT
Start: 2021-06-25

## 2021-07-07 ENCOUNTER — HOSPITAL ENCOUNTER (EMERGENCY)
Facility: HOSPITAL | Age: 61
Discharge: HOME | End: 2021-07-07
Attending: EMERGENCY MEDICINE
Payer: MEDICARE

## 2021-07-07 VITALS
DIASTOLIC BLOOD PRESSURE: 58 MMHG | WEIGHT: 208.13 LBS | BODY MASS INDEX: 36.88 KG/M2 | HEIGHT: 63 IN | OXYGEN SATURATION: 95 % | TEMPERATURE: 99 F | RESPIRATION RATE: 18 BRPM | HEART RATE: 100 BPM | SYSTOLIC BLOOD PRESSURE: 104 MMHG

## 2021-07-07 DIAGNOSIS — K64.4 EXTERNAL HEMORRHOID: ICD-10-CM

## 2021-07-07 DIAGNOSIS — K62.89 RECTAL PAIN: Primary | ICD-10-CM

## 2021-07-07 PROCEDURE — 99283 EMERGENCY DEPT VISIT LOW MDM: CPT | Mod: ,,, | Performed by: EMERGENCY MEDICINE

## 2021-07-07 PROCEDURE — 99283 PR EMERGENCY DEPT VISIT,LEVEL III: ICD-10-PCS | Mod: ,,, | Performed by: EMERGENCY MEDICINE

## 2021-07-07 PROCEDURE — 99283 EMERGENCY DEPT VISIT LOW MDM: CPT

## 2021-07-07 RX ORDER — HYDROCORTISONE 25 MG/G
CREAM TOPICAL 2 TIMES DAILY
Qty: 20 G | Refills: 0 | Status: SHIPPED | OUTPATIENT
Start: 2021-07-07 | End: 2021-07-07 | Stop reason: SDUPTHER

## 2021-07-07 RX ORDER — HYDROCORTISONE 25 MG/G
CREAM TOPICAL 2 TIMES DAILY
Qty: 20 G | Refills: 0 | Status: SHIPPED | OUTPATIENT
Start: 2021-07-07

## 2021-07-08 ENCOUNTER — OUTPATIENT CASE MANAGEMENT (OUTPATIENT)
Dept: ADMINISTRATIVE | Facility: OTHER | Age: 61
End: 2021-07-08

## 2021-07-13 ENCOUNTER — TELEPHONE (OUTPATIENT)
Dept: INTERNAL MEDICINE | Facility: CLINIC | Age: 61
End: 2021-07-13

## 2021-07-13 DIAGNOSIS — I10 ESSENTIAL HYPERTENSION: ICD-10-CM

## 2021-07-13 DIAGNOSIS — Z79.899 HIGH RISK MEDICATION USE: ICD-10-CM

## 2021-07-13 DIAGNOSIS — E11.65 UNCONTROLLED TYPE 2 DIABETES MELLITUS WITH HYPERGLYCEMIA: Primary | ICD-10-CM

## 2021-07-19 ENCOUNTER — PATIENT MESSAGE (OUTPATIENT)
Dept: INTERNAL MEDICINE | Facility: CLINIC | Age: 61
End: 2021-07-19

## 2021-07-19 DIAGNOSIS — K59.00 CONSTIPATION, UNSPECIFIED CONSTIPATION TYPE: Primary | ICD-10-CM

## 2021-07-20 RX ORDER — DOCUSATE SODIUM 100 MG/1
CAPSULE, LIQUID FILLED ORAL
Qty: 60 CAPSULE | Refills: 6 | Status: SHIPPED | OUTPATIENT
Start: 2021-07-20

## 2021-07-22 ENCOUNTER — OUTPATIENT CASE MANAGEMENT (OUTPATIENT)
Dept: ADMINISTRATIVE | Facility: OTHER | Age: 61
End: 2021-07-22

## 2021-07-22 ENCOUNTER — TELEPHONE (OUTPATIENT)
Dept: INTERNAL MEDICINE | Facility: CLINIC | Age: 61
End: 2021-07-22
Payer: MEDICARE

## 2021-07-22 ENCOUNTER — HOSPITAL ENCOUNTER (EMERGENCY)
Facility: HOSPITAL | Age: 61
Discharge: HOME | End: 2021-07-24
Attending: EMERGENCY MEDICINE
Payer: MEDICARE

## 2021-07-22 ENCOUNTER — PATIENT OUTREACH (OUTPATIENT)
Dept: EMERGENCY MEDICINE | Facility: HOSPITAL | Age: 61
End: 2021-07-22

## 2021-07-22 VITALS
HEIGHT: 63 IN | RESPIRATION RATE: 20 BRPM | OXYGEN SATURATION: 94 % | HEART RATE: 96 BPM | WEIGHT: 207.25 LBS | SYSTOLIC BLOOD PRESSURE: 110 MMHG | TEMPERATURE: 99 F | DIASTOLIC BLOOD PRESSURE: 63 MMHG | BODY MASS INDEX: 36.72 KG/M2
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
## 2021-07-22 PROCEDURE — 99284 EMERGENCY DEPT VISIT MOD MDM: CPT

## 2021-07-22 PROCEDURE — 99284 PR EMERGENCY DEPT VISIT,LEVEL IV: ICD-10-PCS | Mod: ,,, | Performed by: EMERGENCY MEDICINE

## 2021-07-22 PROCEDURE — 99284 EMERGENCY DEPT VISIT MOD MDM: CPT | Mod: ,,, | Performed by: EMERGENCY MEDICINE

## 2021-07-30 ENCOUNTER — CARE AT HOME (OUTPATIENT)
Dept: HOME HEALTH SERVICES | Facility: CLINIC | Age: 61
End: 2021-07-30
Payer: MEDICARE

## 2021-07-30 DIAGNOSIS — N25.81 SECONDARY HYPERPARATHYROIDISM OF RENAL ORIGIN: ICD-10-CM

## 2021-07-30 DIAGNOSIS — I70.0 ATHEROSCLEROSIS OF AORTA: ICD-10-CM

## 2021-07-30 DIAGNOSIS — E11.69 TYPE 2 DIABETES MELLITUS WITH OBESITY: ICD-10-CM

## 2021-07-30 DIAGNOSIS — N18.6 ESRD ON PERITONEAL DIALYSIS: ICD-10-CM

## 2021-07-30 DIAGNOSIS — I65.23 BILATERAL CAROTID ARTERY STENOSIS: ICD-10-CM

## 2021-07-30 DIAGNOSIS — N18.6 ANEMIA IN ESRD (END-STAGE RENAL DISEASE): ICD-10-CM

## 2021-07-30 DIAGNOSIS — G40.209 PARTIAL SYMPTOMATIC EPILEPSY WITH COMPLEX PARTIAL SEIZURES, NOT INTRACTABLE, WITHOUT STATUS EPILEPTICUS: ICD-10-CM

## 2021-07-30 DIAGNOSIS — J44.9 MODERATE COPD (CHRONIC OBSTRUCTIVE PULMONARY DISEASE): ICD-10-CM

## 2021-07-30 DIAGNOSIS — I61.9 HEMORRHAGIC CEREBROVASCULAR ACCIDENT (CVA): ICD-10-CM

## 2021-07-30 DIAGNOSIS — K59.01 SLOW TRANSIT CONSTIPATION: ICD-10-CM

## 2021-07-30 DIAGNOSIS — Z78.9 IMPAIRED MOBILITY AND ADLS: ICD-10-CM

## 2021-07-30 DIAGNOSIS — D63.1 ANEMIA IN ESRD (END-STAGE RENAL DISEASE): ICD-10-CM

## 2021-07-30 DIAGNOSIS — E66.9 TYPE 2 DIABETES MELLITUS WITH OBESITY: ICD-10-CM

## 2021-07-30 DIAGNOSIS — G47.33 OSA (OBSTRUCTIVE SLEEP APNEA): ICD-10-CM

## 2021-07-30 DIAGNOSIS — Z99.2 ESRD ON PERITONEAL DIALYSIS: ICD-10-CM

## 2021-07-30 DIAGNOSIS — I10 ESSENTIAL HYPERTENSION: ICD-10-CM

## 2021-07-30 DIAGNOSIS — Z74.09 IMPAIRED MOBILITY AND ADLS: ICD-10-CM

## 2021-07-30 DIAGNOSIS — I50.32 CHRONIC DIASTOLIC HEART FAILURE: ICD-10-CM

## 2021-07-30 PROCEDURE — 99350 PR HOME VISIT,ESTAB PATIENT,LEVEL IV: ICD-10-PCS | Mod: S$GLB,,, | Performed by: NURSE PRACTITIONER

## 2021-07-30 PROCEDURE — 99350 HOME/RES VST EST HIGH MDM 60: CPT | Mod: S$GLB,,, | Performed by: NURSE PRACTITIONER

## 2021-07-30 PROCEDURE — 99499 RISK ADDL DX/OHS AUDIT: ICD-10-PCS | Mod: S$GLB,,, | Performed by: NURSE PRACTITIONER

## 2021-07-30 PROCEDURE — 99499 UNLISTED E&M SERVICE: CPT | Mod: S$GLB,,, | Performed by: NURSE PRACTITIONER

## 2021-08-01 ENCOUNTER — PATIENT OUTREACH (OUTPATIENT)
Dept: ADMINISTRATIVE | Facility: HOSPITAL | Age: 61
End: 2021-08-01

## 2021-08-01 ENCOUNTER — PATIENT MESSAGE (OUTPATIENT)
Dept: ADMINISTRATIVE | Facility: HOSPITAL | Age: 61
End: 2021-08-01

## 2021-08-01 DIAGNOSIS — E11.69 TYPE 2 DIABETES MELLITUS WITH OBESITY: Primary | ICD-10-CM

## 2021-08-01 DIAGNOSIS — E66.9 TYPE 2 DIABETES MELLITUS WITH OBESITY: Primary | ICD-10-CM

## 2021-08-03 ENCOUNTER — OUTPATIENT CASE MANAGEMENT (OUTPATIENT)
Dept: ADMINISTRATIVE | Facility: OTHER | Age: 61
End: 2021-08-03

## 2021-08-05 ENCOUNTER — PATIENT OUTREACH (OUTPATIENT)
Dept: EMERGENCY MEDICINE | Facility: HOSPITAL | Age: 61
End: 2021-08-05

## 2021-08-05 ENCOUNTER — OUTPATIENT CASE MANAGEMENT (OUTPATIENT)
Dept: ADMINISTRATIVE | Facility: OTHER | Age: 61
End: 2021-08-05

## 2021-08-05 VITALS
OXYGEN SATURATION: 98 % | RESPIRATION RATE: 20 BRPM | DIASTOLIC BLOOD PRESSURE: 72 MMHG | HEART RATE: 71 BPM | SYSTOLIC BLOOD PRESSURE: 125 MMHG

## 2021-08-07 ENCOUNTER — PATIENT OUTREACH (OUTPATIENT)
Dept: ADMINISTRATIVE | Facility: HOSPITAL | Age: 61
End: 2021-08-07

## 2021-08-10 ENCOUNTER — TELEPHONE (OUTPATIENT)
Dept: INTERNAL MEDICINE | Facility: CLINIC | Age: 61
End: 2021-08-10

## 2021-08-12 PROCEDURE — G0179 MD RECERTIFICATION HHA PT: HCPCS | Mod: ,,, | Performed by: INTERNAL MEDICINE

## 2021-08-12 PROCEDURE — G0179 PR HOME HEALTH MD RECERTIFICATION: ICD-10-PCS | Mod: ,,, | Performed by: INTERNAL MEDICINE

## 2021-08-13 ENCOUNTER — OUTPATIENT CASE MANAGEMENT (OUTPATIENT)
Dept: ADMINISTRATIVE | Facility: OTHER | Age: 61
End: 2021-08-13

## 2021-09-08 ENCOUNTER — EXTERNAL HOME HEALTH (OUTPATIENT)
Dept: HOME HEALTH SERVICES | Facility: HOSPITAL | Age: 61
End: 2021-09-08
Payer: MEDICARE

## 2021-09-17 ENCOUNTER — PATIENT MESSAGE (OUTPATIENT)
Dept: INTERNAL MEDICINE | Facility: CLINIC | Age: 61
End: 2021-09-17

## 2021-10-25 ENCOUNTER — PATIENT MESSAGE (OUTPATIENT)
Dept: ADMINISTRATIVE | Facility: HOSPITAL | Age: 61
End: 2021-10-25
Payer: MEDICARE

## 2022-01-01 NOTE — PROGRESS NOTES
Summary:  LCSW spoke with patient for follow-up. Pt stated she is doing fair. She is getting ready for an appointment this afternoon with PMR. Pt denied other needs to be addressed at this time. As a result, case closure was discussed in which patient was in agreement. However, should there be future needs, contact information was provided.     Interventions:  Encouraged compliance with and communication with providers  Case closure    Plan:  None    Todays OPCM Self-Management Care Plan was developed with the patients/caregivers input and was based on identified barriers from todays assessment.  Goals were written today with the patient/caregiver and the patient has agreed to work towards these goals to improve his/her overall well-being. Patient verbalized understanding of the care plan, goals, and all of today's instructions. Encouraged patient/caregiver to communicate with his/her physician and health care team about health conditions and the treatment plan.  Provided my contact information today and encouraged patient/caregiver to call me with any questions as needed.  
No

## 2022-02-23 DIAGNOSIS — D84.9 IMMUNOSUPPRESSED STATUS: ICD-10-CM

## 2022-03-08 NOTE — TELEPHONE ENCOUNTER
----- Message from Cecy Loya sent at 8/9/2018 12:39 PM CDT -----  Contact: Kimmy/[phone]  Type:Home Health(orders,updates,clarifications,etc)    Home Health Agency/Nurse:Kimmy    Phone number:[phone]    Reason for call:    Comments: would like to get a copy of the labs that patient had done on 08/03/18.thank you!!!    
Advised. Francheska from [facility] is requesting that physician go over lab results with patient.  
Ivelisse, I've already done this; please see the clinic encounter notes. FYI  
normal

## 2022-03-25 NOTE — TELEPHONE ENCOUNTER
Nutritional assessment from dialysis unit received and reviewed--no nutritional changes to plan needed at this time.  Pt to continue to follow-up with renal dietitians recommendations.    
Implemented All Universal Safety Interventions:  [place] to call system. Call bell, personal items and telephone within reach. Instruct patient to call for assistance. Room bathroom lighting operational. Non-slip footwear when patient is off stretcher. Physically safe environment: no spills, clutter or unnecessary equipment. Stretcher in lowest position, wheels locked, appropriate side rails in place.

## 2022-03-30 NOTE — ASSESSMENT & PLAN NOTE
ESRD  - with nausea, vomiting, fatigue, BUN higher than baseline, overall worsening renal function  - nephrology consulted, DOMONIQUE placed R permacath for HD, first HD session done 8/10/2018  - awaiting further nepro recs  - outpatient HD lab ordered. SW has been alerted for HD chair set up  - family had questions about home HD, discussed case with Nephrology fellow, at this time will need outpatient HD first then eventually can be transfer to home HD by her outpatient nephrologist if able at that itme   Outreach attempt was made to schedule a Medicare Wellness Visit. This was the first attempt. Contact was not made, left message. Letter was mailed out. · Discharge to [facility]  · Resume prior diet  · Activity under direction of physical therapists  · See med rec - has not required any insulin inpatient (no glargine, no scheduled mealtime lispro, no sliding scale lispro) and recommend sliding scale only for now. Also has not been receiving metoprolol and BP normal, recommend continued holding. Discontinuing flomax as chronic carlos, and therefore flomax not necessary.   · Consult to palliative services at [facility], d/w family who is agreeable to continued goals of care discussions  · Will need carlos exchanged 1/27/22, and should be exchanged monthly thereafter  · See follow ups above          Understanding Dementia  Dementia is the name for a group of brain conditions that make it harder to remember, reason, and communicate. The most common form of dementia is Alzheimer disease. Other types include vascular dementia, frontotemporal dementia, and Lewy body dementia. Years ago, dementia was often called “senility.” It was even thought to be a normal part of aging. We now know that it’s not normal. It’s caused by ongoing damage to cells in the brain.     Symptoms of dementia  Symptoms differ depending on which parts of the brain are affected and the stage of the disease. The most common symptoms include:   · Memory loss, including trouble with directions and familiar tasks  · Language problems, such as trouble getting words out or understanding what is said  · Trouble with planning, problem-solving, organizing, concentration, and judgment. This includes people not being able to recognize their own symptoms.  · Changes in behavior and personality  How dementia affects the brain  The brain controls all the workings of the mind and body. Some parts of the brain control memory and language. Other parts control movement and coordination. With dementia, nerve cells in the brain are gradually damaged or destroyed. In many cases, why this happens is not yet clear. But  over time, parts of the brain begin to shrink (atrophy). This often starts in the part of the brain that controls memory, reasoning, and personality. Other parts of the brain may not be affected until much later in the illness.   The stages of dementia  Dementia is a progressive disease. This means it gets worse over time. Symptoms differ for each person, but there are 3 basic stages. Each may last from months to years:   · Early stage. A person may seem forgetful, confused, or have changes in behavior. But they may still be able to handle most tasks without help.  · Middle stage. More and more help is needed with daily tasks. A person may have trouble recognizing friends and family members, wander, or get lost in familiar places. They may also become restless or robles.  · Late stage. Dementia can cause severe problems with memory, judgment, and other skills. Help is needed with nearly every aspect of daily life.  Treating dementia  Right now, there’s no cure for dementia. But with proper care, many people can live comfortably for years:    · Medicines are a key part of treatment. Some types can help slow the progression of symptoms, such as memory loss. Others can help ease mood, behavior, and sleep problems. These medicines work for some people but not all.  · Activity and exercise are good for body and mind. They may even help slow the progression of the disease. Simple, repetitive activities are good choices.  · Regular healthcare provider visits help keep track of symptoms and overall health.  · The sleep-wake cycle can be mixed up in people with dementia. They may function better being up at nighttime and sleeping during the daytime.    · Social interactions are important to maintain. Educate family members and friends about dementia. Plan social situations so they are not stressful to the person. Limit the number of visitors or the length of the visit.   Yolanda last reviewed this educational content on  8/1/2021 © 2000-2021 [employer]. All rights reserved. This information is not intended as a substitute for professional medical care. Always follow your healthcare professional's instructions.          Catheter-Associated Urinary Tract Infections     A small balloon keeps the catheter in place inside the bladder.   A catheter-associated urinary tract infection (CAUTI) is an infection of the urinary system. CAUTI is caused by bacteria that enter the urinary tract when a urinary catheter is used. This is a tube that’s placed into the bladder to drain urine.  The urinary system  This system includes the kidneys, ureters, bladder, and urethra. The kidneys filter blood and make urine. The ureters carry urine from the kidneys to the bladder. The bladder stores urine. The urethra carries urine from the bladder to the outside of the body.  What is a urinary catheter?  A urinary catheter is a thin, flexible tube. It is placed in the bladder to drain urine. Urine flows through the tube into a collecting bag outside of the body. There are different types of urinary catheters. The most common type is an indwelling catheter. This is also known as a urethral catheter. This is because it’s placed into the bladder through the urethra. This catheter is also called a Ibrahim catheter.  Why is a urinary catheter needed?  A urinary catheter is needed for any of the following:  · You can’t get up to use the toilet because your mobility is limited. This may be due to surgery, an injury, or illness.  · You have a blockage in your urinary system.  · Your healthcare provider needs to measure the amount of urine you pass.  · The function of your kidneys and bladder is being tested.  · You’re not able to control your bladder (incontinence).  In most cases, the urinary catheter is temporary. You'll need it only until the problem that requires it is resolved.   How does a CAUTI develop?  Bacteria can enter the urinary tract as  the catheter is put into the urethra. Bacteria can also get into the urinary tract while the catheter is in place. The common bacteria that cause a CAUTI are ones that live in the intestine. These bacteria don’t normally cause problems in the intestine. But when they get into the urinary tract, a CAUTI can result.  Why is a CAUTI of concern?  Left untreated, a CAUTI can lead to health problems. These problems may include bladder infection, prostate infection, and kidney infection. A CAUTI can prolong your hospital stay. If the infection is not treated in time, serious health complications may occur.  What are the symptoms of a CAUTI?  · A burning feeling, pressure, or pain in your lower abdomen  · Fever or chills  · Urine in the collecting bag is cloudy or bloody (pink or red)  · Burning feeling in the urethra or genital area  · Aching in the back (kidney area)  · Nausea and vomiting  · Person is confused, or is not alert, or has a change in behavior (mainly affects older patients)  · Note that sometimes a person won’t have any symptoms but may still have a CAUTI.  Tell a healthcare provider right away if you or your loved one has any of these symptoms.  How is CAUTI diagnosed?  If you have symptoms of CAUTI your healthcare provider will order tests. These include a urine test, blood tests, and other tests as needed.  How is CAUTI treated?   Treatment may involve any of the following:  · Antibiotics. Your healthcare provider will likely prescribe antibiotics if you have symptoms. Be aware that if you don’t have symptoms, you may not be given antibiotics. This is to prevent an increase in bacteria that resist (can’t be killed by) certain antibiotics.  · Removing the catheter. The catheter will be removed when your healthcare provider decides it’s no longer needed. This usually helps stop the infection.  · Changing the catheter. If you still need a catheter, the old one will be removed. A new one will be put in. This  may help stop the infection.  How do hospital and long-term facility staff prevent CAUTI?  To keep patients from getting a CAUTI, the staff follow certain procedures:  · Prescribe a catheter only when it’s needed. It is removed as soon as it’s no longer needed.  · Use sterile (clean) technique when placing the catheter into the urinary tract. This means before putting the catheter in, the caregiver washes his or her hands with soap and water. He or she then puts on sterile gloves. A sterile catheter kit that has cleansers is used to cleanse the patient’s genital area.  · Before performing catheter care, caregivers also wash their hands or use an alcohol-based hand cleanser.  · Hang the bag lower than your bladder. This prevents urine from flowing back into your bladder.  · Ensure that the bag is emptied regularly.  What you can do as a patient to prevent CAUTI  You can help prevent yourself from getting a CAUTI by doing the following:  · Every day ask your healthcare provider how long you need to have the catheter. The longer you have a catheter, the higher your chance of getting a CAUTI.  · If a caregiver doesn’t clean his or her hands and put on gloves before touching your catheter, ask them to do so.  · If you’ve been taught how to care for your catheter, be sure to wash your hands before and after each session.  · Make sure your bag is lower than your bladder. If it’s not, tell your caregiver.  · Don’t disconnect the catheter and drain tube. Doing so allows germs to get into the catheter.  · Cleansing of the genital and perineal areas is very important to help decrease bacteria in areas surrounding the catheter. Ask your doctor what you should use and how often to clean these areas.  If you are discharged with an indwelling catheter  · Before you leave the hospital, make sure you understand the instructions on how to care for your catheter at home.  · Ask your healthcare provider how long you need the catheter.  Also ask if you need to make a follow-up appointment to have the catheter removed.  · Always use sterile (clean) technique when caring for your catheter. Wash your hands before and after doing any catheter care.  · Call your healthcare provider right away if you develop symptoms of a CAUTI (see above).   Date Last Reviewed: 1/1/2017 © 2000-2018 [employer]. [address] [place] [address]. All rights reserved. This information is not intended as a substitute for professional medical care. Always follow your healthcare professional's instructions.          Leg Artery Emergencies: Critical Limb Ischemia (CLI)  Critical limb ischemia (CLI) is a condition that can occur over time when your leg arteries are damaged. It is a severe form of peripheral arterial disease (PAD). PAD is caused when leg arteries are narrowed, reducing blood flow. If blood flow to the toe, foot, or leg is completely blocked, the tissue begins to die (gangrene). If this happens, you need medical care right away to restore blood flow and possibly save the leg. But even with the best medical care, it might not be possible to save a severely affected limb.  When do you need emergency care?    CLI can get worse and cause an urgent problem. For example, if you have a wound, it may not heal. This can lead to gangrene. Go to the emergency room right away if you have any of these symptoms:  · A wound that is foul smelling, draining pus, or discolored  · Severe foot or leg pain that occurs suddenly without injury, especially if the foot or leg is cold or numb  Call your healthcare provider if:  · You have a cut or pressure injury that does not heal  · You have foot or leg pain that happens when walking but goes away with rest. This may be a sign of blocked arteries.  How is critical limb ischemia diagnosed?  Certain tests may be done to find out if you have CLI. First your provider will carefully examine you, checking for pulses at  several places. Other common tests include:  · Ankle-brachial index (WILDER). The blood pressure in your ankle is compared with the blood pressure in your arm.  · Duplex ultrasound. Harmless sound waves are used to create images of blood flow in your legs.  · Arteriography. X-ray dye (contrast medium) is injected into the artery using a thin, flexible tube (catheter). This allows blood vessels to be seen easily on X-rays.  How is critical limb ischemia treated?  Possible treatments for CLI include:  · Dissolving or removing a blood clot. To dissolve a clot, a tube (catheter) is put into an artery in your groin. Clot-busting medicine is put into the tube to dissolve the clot. Or surgery may be done to remove the clot. A cut (incision) is made in the artery at the blocked area. The clot is then removed.  · Angioplasty. A tiny, uninflated balloon is sent to the narrowed area by a catheter. It is then inflated to widen the artery. The balloon is deflated and removed.  · Stenting. After angioplasty, a tiny wire mesh tube (stent) may be put in the artery to help hold it open. The stent is also put in using a catheter.  · Endarterectomy. An incision is made in the artery at the blocked area. The material that blocks the artery is then removed from artery walls.  · Peripheral bypass surgery. A natural or artificial graft is used to bypass the blocked area.  · Amputation. If left untreated, the affected area may have to be removed (amputated).  How can critical limb ischemia emergencies be prevented?  Know the signs and symptoms of a leg artery emergency. If you have diabetes or poor blood circulation, check your feet daily for wounds, sores, blisters, and color changes. If you smoke, stop smoking.  Date Last Reviewed: 6/1/2016  © 3562-7540 [employer]. [address] [place] [address], [place] [address]. All rights reserved. This information is not intended as a substitute for professional medical care. Always follow your  healthcare professional's instructions.

## 2022-05-17 NOTE — PLAN OF CARE
Add 67066 Cpt? (Important Note: In 2017 The Use Of 97711 Is Being Tracked By Cms To Determine Future Global Period Reimbursement For Global Periods): yes Problem: Patient Care Overview  Goal: Plan of Care Review  Outcome: Ongoing (interventions implemented as appropriate)  Educated on duration of hD and amt of fluid removal       Detail Level: Detailed

## 2022-06-08 NOTE — ASSESSMENT & PLAN NOTE
Outpatient HD Information:    -Outpatient HD unit:  -Nephrologist:   -HD tx days: MWF  -HD tx time:   -Last HD tx: M  -HD access: RUE AVG (s/p declot)  -HD modality: iHD   -Residual urine:   -EDW:    Inpatient HD Plan:    -received 2hr HD session last night for volume management and metabolic clearance, BUN still 86 this morning, will do another session this afternoon      Patient is eligible for onsite nursing services through his employer, [employer].  Patient comes in today with a complaint of a pulled groin muscle. He strained it while trying to open his garage door and noticed the pain the next day. Advised resting the muscle as well as heat and ice. If no improvement in a few days, patient should see his PCP, which he has.    Plan: Will close case at this time.  If needs arise in future would be happy to work with patient.

## 2022-07-05 NOTE — TELEPHONE ENCOUNTER
----- Message from Maylin Weiss sent at 7/9/2019  3:31 PM CDT -----  Contact: [phone]  Caller is requesting a sooner appointment. Caller declined first available appointment listed below. Caller will not accept being placed on the wait list and is requesting a message be sent to the provider.    When is the next available appointment:  September   Did you offer to schedule the next available appt and put the patient on the wait list?:     What visit type: hospital follow up  Symptoms:  Hospital follow up  Patient preference of timeframe to be scheduled:asap    What is the reason the patient is requesting a sooner appointment? (insurance terminating, changing jobs):    Would the patient rather a call back or a response via [facility]?:call back    Comments:  Please advise ,thanks    No

## 2022-09-16 NOTE — TELEPHONE ENCOUNTER
Labs placed for [facility] lab, per protocol.   Pending to [facility] for approval Spoke with Reina, states mom ad 2 seizures this morning. Pt headed to the ER. Daughter states that her mom looks okay but pt c/o a bad headache and neck pain.

## 2022-12-05 NOTE — NURSING
[facility]    Discharge Summary  Hospitalist    Date of Admission:  12/4/2022  Date of Discharge:  12/5/2022  Provider:  Oren Majano MD  Date of Service (when I last saw the patient): 12/05/22    Discharge Diagnoses   Acute T12 compression fracture with mild spinal canal stenosis secondary to recent mechanical fall.    E. coli Urinary tract infection. .  Hypokalemia, possibly due to alcohol use.  Hyponatremia, mild and not clinically significant.  Asymptomatic COVID-19 infection.  She is vaccinated.  Alcohol dependence with a history of alcohol withdrawal.  No evidence of alcohol withdrawal currently.      Other medical issues:  Past Medical History:   Diagnosis Date     Hypertension        History of Present Illness   Rizwana Aguilar is an 69 year old female who presented with back pain.  Please see the admission history and physical for full details.    Hospital Course   Rizwana Aguilar was admitted on 12/4/2022.  She has a history of alcohol dependence, alcohol withdrawal, alcohol pancreatitis, alcoholic liver disease, hypertension, paroxysmal atrial fibrillation, and breast cancer. She presented to the hospital with increasing main-low back pain.  She has recently had a fall approximately 1-2 weeks ago.  She hurt her back.  She has been having difficulty mobilizing the past week because of the pain.  She came to the ER for an assessment.  Working on the discharge summary right now but that you know on arrival to the ER, vital signs included a blood pressure of 113/72 with a heart rate of 70. No fever.  Saturation 98% on room air.  Workup in the ER included labs and imaging.  CBC unremarkable.  Complete metabolic panel shows potassium 3.1.  Calcium of 11.  Liver function tests unremarkable.  BUN and AST slightly high at 47.  Alcohol level undetectable.  Urinalysis shows pyuria.  Urine culture is pending.  COVID-19 is positive.    Imaging included a head CT scan showed no acute findings.   Pt's BP is 92/54 manually, pt states she feels fine, BECKA Valdes notified   Thoracic, lumbar spine CT scan showed a moderate 40% compression fracture at T12 with mild retropulsion likely acute or subacute.  Mild spinal canal stenosis noted on thoracic spine MRI in the area of T12 compression fracture.  She was consulted by the spine team who considered she is not a candidate for any intervention other than a brace, and they recommend to follow-up with them in office.  Her urine culture tested positive for E. Coli.   She had a full assessment by physical therapy who recommend home PT treatment.  She has been also interviewed by the  to assess for further necessities.     # Discharge Pain Plan:    - During her hospitalization, Rizwana experienced pain due to vertebral fracture.  The pain plan for discharge was discussed with Rizwana and the plan was created in a collaborative fashion.    - Opioids prescribed on discharge: Oxycodone  - Duration of opioids after discharge: Per CDC opioid prescribing guidelines, a 3 day prescription of opioids was provided.  - Bowel regimen: not needed      Significant Results and Procedures   See below    Pending Results     Unresulted Labs Ordered in the Past 30 Days of this Admission     Date and Time Order Name Status Description    12/4/2022  1:04 PM Urine Culture Preliminary           Code Status   Full Code       Primary Care Physician   FELIPE GODOY    GEN:  Alert, oriented x 3, appears comfortable, NAD.  HEENT:  Normocephalic/atraumatic, no scleral icterus, no nasal discharge, mouth moist.  CV:  Regular rate and rhythm, no murmur or JVD.  S1 + S2 noted, no S3 or S4.  LUNGS:  Clear to auscultation bilaterally without rales/rhonchi/wheezing/retractions.  Symmetric chest rise on inhalation noted.  ABD:  Active bowel sounds, soft, non-tender/non-distended.  No rebound/guarding/rigidity.  EXT:  No edema or cyanosis.  No joint synovitis noted.  SKIN:  Dry to touch, no exanthems noted in the visualized areas.     Discharge Disposition   Discharged to  home    Consultations This Hospital Stay   ORTHOSIS BRACE IP CONSULT  CARE MANAGEMENT / SOCIAL WORK IP CONSULT  PHYSICAL THERAPY ADULT IP CONSULT  NEUROSURGERY IP CONSULT  CARE MANAGEMENT / SOCIAL WORK IP CONSULT    Time Spent on this Encounter   I, Oren Majano MD, personally saw the patient today and spent greater than 30 minutes discharging this patient.    Discharge Orders      Follow-up and recommended labs and tests     Please follow up at [facility] in 6 weeks with xray prior .  You may call the clinic with any scheduling questions or concerns.    [facility]  [address] [address]  Tel [phone]  Fax [phone]     Activity    Continue to wear brace when out of bed.    Limit lifting to 10 pounds and limit bending/twisting until follow up visit.   No contact sports or high impact activities until  follow up visit.     Discharge Medications   Current Discharge Medication List      CONTINUE these medications which have NOT CHANGED    Details   acetaminophen (TYLENOL) 325 MG tablet Take 325-650 mg by mouth every 8 hours as needed for mild pain      aspirin 81 MG EC tablet Take 81 mg by mouth daily      calcium carbonate (OS-KHUSHI) 500 MG tablet Take 1 tablet by mouth daily      clopidogrel (PLAVIX) 75 MG tablet Take 75 mg by mouth daily      diltiazem ER (DILT-XR) 120 MG 24 hr capsule Take 120 mg by mouth daily      folic acid (FOLVITE) 1 MG tablet Take 1 tablet (1 mg) by mouth daily  Qty: 30 tablet, Refills: 0    Associated Diagnoses: Alcohol withdrawal syndrome, with delirium (H)      lisinopril (ZESTRIL) 20 MG tablet Take 20 mg by mouth daily      metoprolol succinate ER (TOPROL XL) 50 MG 24 hr tablet Take 50 mg by mouth daily      omeprazole (PRILOSEC) 20 MG DR capsule Take 40 mg by mouth daily before breakfast           Allergies   Allergies   Allergen Reactions     Penicillins Unknown     Data    Most Recent 3 CBC's:Recent Labs   Lab Test 12/05/22  0650 12/04/22  1209 05/09/21  0658   WBC 4.2 5.2 4.2   HGB 11.1* 13.1 12.5   * 111* 98    310 99*      Most Recent 3 BMP's:  Recent Labs   Lab Test 12/05/22  0650 12/04/22  1943 12/04/22  1209 05/09/21  0658   *  --  134* 137   POTASSIUM 3.7 3.6 3.1* 3.1*   CHLORIDE 97*  --  90* 107   CO2 24  --  22 21   BUN 9.4  --  10.9 2*   CR 0.46*  --  0.59 0.45*   ANIONGAP 14  --  22* 9   KHUSHI 9.7  --  11.1* 8.4*   *  --  155* 126*     Most Recent 2 LFT's:  Recent Labs   Lab Test 12/04/22  1209 05/09/21  0658   AST 47* 73*   ALT 12 75*   ALKPHOS 102 71   BILITOTAL 0.5 0.9     Most Recent INR's and Anticoagulation Dosing History:  Anticoagulation Dose History     Recent Dosing and Labs Latest Ref Rng & Units 5/7/2021    INR 0.86 - 1.14 0.86        Most Recent 3 Troponin's:  Recent Labs   Lab Test 01/08/20  1526   TROPI <0.015     Most Recent Cholesterol Panel:No lab results found.  Most Recent 6 Bacteria Isolates From Any Culture (See EPIC Reports for Culture Details):No lab results found.  Most Recent TSH, T4 and A1c Labs:No lab results found.  Results for orders placed or performed during the hospital encounter of 12/04/22   Head CT w/o contrast    Narrative    EXAM: CT HEAD W/O CONTRAST  LOCATION: [facility]  DATE/TIME: 12/4/2022 1:35 PM    INDICATION: Fall, head trauma  COMPARISON: None.  TECHNIQUE: Routine CT Head without IV contrast. Multiplanar reformats. Dose reduction techniques were used.    FINDINGS:  INTRACRANIAL CONTENTS: No intracranial hemorrhage, extraaxial collection, or mass effect.  No CT evidence of acute infarct. Moderate presumed chronic small vessel ischemic changes. Moderate generalized volume loss. No hydrocephalus. Calcified plaque   within the left M1 segment.     VISUALIZED ORBITS/SINUSES/MASTOIDS: No intraorbital abnormality. No paranasal sinus mucosal disease. No middle ear or mastoid  effusion.    BONES/SOFT TISSUES: No acute abnormality.      Impression    IMPRESSION:  1.  No CT evidence for acute intracranial process.  2.  Brain atrophy and presumed chronic microvascular ischemic changes as above.   CT Thoracic Spine w/o Contrast    Narrative    EXAM: CT THORACIC SPINE WITHOUT CONTRAST, CT LUMBAR SPINE WITHOUT CONTRAST  LOCATION: [facility]  DATE/TIME: 12/04/2022, 1:35 PM    INDICATION: Fall, lower thoracic spine pain.  COMPARISON: CT abdomen and pelvis 05/07/2021.  TECHNIQUE:  1) Routine CT Thoracic Spine without IV contrast. Multiplanar reformats. Dose reduction techniques were used.   2) Routine CT Lumbar Spine without IV contrast. Multiplanar reformats. Dose reduction techniques were used.     FINDINGS:    THORACIC SPINE CT:  VERTEBRA: Normal alignment. Diffuse osteopenia. Mild superior endplate compression fracture at T7 with 25% height loss, age-indeterminate but favored to be chronic. Moderate superior endplate compression fracture at T12 with 40% height loss and mild   retropulsion of the posterosuperior cortex, likely acute or subacute, with mild paraspinal edema at this level. No other compression fractures. Minimal disc space narrowing with mild marginal osteophytes.     CANAL/FORAMINA: No canal or neural foraminal stenosis.    PARASPINAL: Hiatal hernia. Cardiac valve replacement. Scattered calcified plaque thoracic aorta.    LUMBAR SPINE CT:  VERTEBRA: Anterior compression deformity at L1 with 40% height loss anteriorly, unchanged compared to CT abdomen and pelvis 05/07/2021. No retropulsion. Otherwise normal vertebral body heights. Normal alignment. No other fractures.     CANAL/FORAMINA: Mild spinal canal narrowing at L2-L3. Mild to moderate spinal canal stenosis at L3-L4 with mild left foraminal stenosis. Mild to moderate spinal canal stenosis at L4-L5.    PARASPINAL: Scattered calcified plaque abdominal aorta.      Impression    IMPRESSION:  THORACIC SPINE  CT:  1.  Moderate 40% compression fracture at T12 with mild retropulsion of the posterior cortex, likely acute or subacute with associated mild paraspinal contusion.  2.  Mild superior endplate compression deformity at T7, age-indeterminate, but favored to be chronic.    LUMBAR SPINE CT:  1.  Chronic 40% anterior compression deformity at L1. No acute fracture.  2.  Spondylosis with spinal canal and foraminal narrowing as detailed.       Lumbar spine CT w/o contrast    Narrative    EXAM: CT THORACIC SPINE WITHOUT CONTRAST, CT LUMBAR SPINE WITHOUT CONTRAST  LOCATION: [facility]  DATE/TIME: 12/04/2022, 1:35 PM    INDICATION: Fall, lower thoracic spine pain.  COMPARISON: CT abdomen and pelvis 05/07/2021.  TECHNIQUE:  1) Routine CT Thoracic Spine without IV contrast. Multiplanar reformats. Dose reduction techniques were used.   2) Routine CT Lumbar Spine without IV contrast. Multiplanar reformats. Dose reduction techniques were used.     FINDINGS:    THORACIC SPINE CT:  VERTEBRA: Normal alignment. Diffuse osteopenia. Mild superior endplate compression fracture at T7 with 25% height loss, age-indeterminate but favored to be chronic. Moderate superior endplate compression fracture at T12 with 40% height loss and mild   retropulsion of the posterosuperior cortex, likely acute or subacute, with mild paraspinal edema at this level. No other compression fractures. Minimal disc space narrowing with mild marginal osteophytes.     CANAL/FORAMINA: No canal or neural foraminal stenosis.    PARASPINAL: Hiatal hernia. Cardiac valve replacement. Scattered calcified plaque thoracic aorta.    LUMBAR SPINE CT:  VERTEBRA: Anterior compression deformity at L1 with 40% height loss anteriorly, unchanged compared to CT abdomen and pelvis 05/07/2021. No retropulsion. Otherwise normal vertebral body heights. Normal alignment. No other fractures.     CANAL/FORAMINA: Mild spinal canal narrowing at L2-L3. Mild to moderate  spinal canal stenosis at L3-L4 with mild left foraminal stenosis. Mild to moderate spinal canal stenosis at L4-L5.    PARASPINAL: Scattered calcified plaque abdominal aorta.      Impression    IMPRESSION:  THORACIC SPINE CT:  1.  Moderate 40% compression fracture at T12 with mild retropulsion of the posterior cortex, likely acute or subacute with associated mild paraspinal contusion.  2.  Mild superior endplate compression deformity at T7, age-indeterminate, but favored to be chronic.    LUMBAR SPINE CT:  1.  Chronic 40% anterior compression deformity at L1. No acute fracture.  2.  Spondylosis with spinal canal and foraminal narrowing as detailed.       Thoracic spine MRI w/o contrast    Narrative    EXAM: MR THORACIC SPINE W/O CONTRAST  LOCATION: [facility]  DATE/TIME: 12/4/2022 3:28 PM    INDICATION: Back pain  COMPARISON: Thoracic and lumbar spine CT dated 12/04/2022  TECHNIQUE: Routine Thoracic Spine MRI without IV contrast.    FINDINGS: The spine is imaged from C6-L2. Straightening of the usual spinal curvature without significant spondylolisthesis. Chronic T7, T8, and L1 vertebral body fractures with acute fracture of the T12 vertebral body (40% height loss) with 4 mm   retropulsion resulting in mild spinal canal stenosis. No suspicious marrow signal alteration. The visualized spinal cord is unremarkable. The paravertebral structures are unremarkable scattered multilevel degenerative change without high-grade spinal   canal stenosis or neural foraminal narrowing.      Impression    IMPRESSION:    1.  Acute T12 compression fracture (40% height loss) with 4 mm retropulsion resulting in mild spinal canal stenosis.    2.  Chronic T7, T8, L1 vertebral body fractures.   XR Lumbar Spine 2/3 Views    Narrative    EXAM: XR LUMBAR SPINE 2/3 VIEWS  LOCATION: [facility]  DATE/TIME: 12/4/2022 4:57 PM    INDICATION: Back pain. T12 fracture  COMPARISON: Thoracic spine MRI dated  12/04/2022  TECHNIQUE: CR Lumbar Spine    FINDINGS: Straightening of usual spinal curvature without significant spondylolisthesis. Redemonstrated acute T12 compression fracture (40% height loss) with 4 mm retropulsion and chronic fracture deformity of the L1 vertebral body. Scattered multilevel   degenerative change, most prominent at L5-S1 where there is mild disc height loss. Vascular calcification. Pelvic phleboliths.     Disclaimer: This note consists of symbols derived from keyboarding, dictation and/or voice recognition software. As a result, there may be errors in the script that have gone undetected. Please consider this when interpreting information found in this chart.

## 2023-07-26 NOTE — PROGRESS NOTES
Came in at bedside to do HD 1:1. Patient is awake alert and oriented. On o2 inhalation via nasal cannula @ 2lpm.     Brachiobasilic avg +faint bruit and thrill. Tried to cannulate arterial side, with good inflow yet once  hooked on HD machine, tends to have high arterial pressure alrams    + Hematoma and hardened spots on the venous pathway. Venous needle cannulated x 2. First needle in-graft but aspirated clots. Second needle placed 3cm above the arterial side, + good flow.    Per history patient had a declot of the avg thursday last week and since then, they are still having troubles sticking her in the facility. Blood pressure has been on low 90s - 100's while on HD, as verbalized by the patient.    Started HD with BFR of 250cc/min, arterial pressure at -230mmHg. SBp at 90's. No fluid removal, Serum K at 5.6mmol/L currently.    Will refer situation to the nephro if HD shall be continued or further assessment /interventions shall be made.        Structured MSE

## 2023-09-05 NOTE — PATIENT INSTRUCTIONS
Counseling and Referral of Other Preventative  (Italic type indicates deductible and co-insurance are waived)    Patient Name: Lucy Perez  Today's Date: 2/28/2020    Health Maintenance       Date Due Completion Date    HIV Screening 08/24/1973 ---    Pneumococcal Vaccine (Highest Risk) (1 of 3 - PCV13) 08/24/1977 ---    Shingles Vaccine (1 of 2) 08/24/2008 ---    Hemoglobin A1c 06/04/2020 12/4/2019    Foot Exam 09/17/2020 9/17/2019    Override on 9/3/2019: Done    Override on 3/29/2017: Done    Override on 10/22/2015: Done    Eye Exam 09/24/2020 9/24/2019    Override on 9/24/2019: Done    Mammogram 10/03/2020 10/3/2019    Lipid Panel 10/28/2020 10/28/2019    Colonoscopy 02/13/2022 2/13/2017    TETANUS VACCINE 01/01/2029 1/1/2019 (Done)    Override on 1/1/2019: Done (Had at [facility])        No orders of the defined types were placed in this encounter.    The following information is provided to all patients.  This information is to help you find resources for any of the problems found today that may be affecting your health:                Living healthy guide: www.[facility].[place].gov      Understanding Diabetes: www.diabetes.org      Eating healthy: www.cdc.gov/healthyweight      CDC home safety checklist: www.cdc.gov/steadi/patient.html      Agency on Aging: www.goea.[place].gov      Alcoholics anonymous (AA): www.aa.org      Physical Activity: www.[place].nih.gov/yd4chdx      Tobacco use: www.quitwithusla.org      yes

## 2024-01-01 NOTE — TELEPHONE ENCOUNTER
Bogdan Walters, Thanks so much for the follow up.  I'm not sure why I prescribed Fluoxetine in October(10/9) and Cialoprim in November.  I believe it was an accidental error. I spoke with Reina, Mrs Ayala's daughter, who will check medications and get back to me today to clear this up.  Thanks so much  Beverly Muniz   [facility]

## 2024-10-09 NOTE — ASSESSMENT & PLAN NOTE
- Residual left hemiparesis, husbands states patient uses wheelchair.   - Discussed with  importance of turning patient, and he expressed understanding.   - PT OT       No abnormal movements

## 2025-02-22 NOTE — PROGRESS NOTES
RN gave report to Nikhil at [facility]. RN will call RN at   when ambulance arrives. Ambulance scheduled to come at 330 PM   Received pt from [id number] confusion noted. O2@2l noted sats 98&. Right subclavian permcath noted flushes well and good blood return oted. HD began with goal to remove 1/2l in 2.5hrs bp permitting

## 2025-07-21 NOTE — PROCEDURES
Confirmation of at least 2 patient identifiers.    Completed telephonic follow-up with patient approximately 14 days post discharge, no PCP follow up.   Spoke to patient during outreach call.    Patient reports feeling: Improved    Patient has questions or concerns about medications: No    Have all prescribed medications been filled? Yes    Patient has necessary resources to manage their care? Yes    Patient has questions or concerns? No    Next care management follow-up approximately within one month.  Care  information provided to patient.                [facility]  Tel: [phone]    Lucy Perez  1960  BMI 35.8  [id number]      CPAP Titration  Hypopnea definition used AASM 1b (4% desat)    Sleep parameters:  Total recording time 455.8 minutes, total sleep time 300.9 minutes and sleep efficiency 66%.   Sleep latency 23.5 minutes and REM sleep latency 404.5 minutes.   Stage NREM1 1.8%, stage NREM2 23.8%, stage NREM 3 67.9% and REM 6.5% of total sleep time.   Wake after sleep onset 131.5 minutes.    Respiratory parameters:  CPAP was initiated at 4 cm H20 and adjusted to 8 cm H20.  REM sleep was present at 8 cm H20.   The most effective pressure setting was 8 cm H20, which reduced the AHI to 6.8.   Supine sleep was present at this setting.  Baseline oxygen saturation ranged from 91% to 95%.   Baseline hypoxemia was not present.         EM periodic limb movements during sleep were present with Periodic Limb Movement Index 0 per hour of sleep.   0 were associated with arousal, with Periodic Limb Movement Arousal Index 0 per hour of sleep.       ECG:  Mean pulse rate during sleep was 62 beats per minute with No significant arrhythmias noted    EEG:  Expanded seizure montage was not used.   No seizure activity was noted.    Impression:   Obstructive Sleep Apnea         Recommendations:  Auto CPAP at 6-10  cm H20 is recommended for home use.   The patient should be followed up after the initiation of therapy to assess for symptom resolution, tolerance and compliance.        (This Sleep Study was interpreted by a Board Certified Sleep Specialist who conducted an epoch-by-epoch review of the entire raw data recording.)     (The indication for this sleep study was reviewed and deemed appropriate by AASM Practice Parameters or other reasons by a Board Certified Sleep Specialist.)

## (undated) DEVICE — KIT MICROINTRO 4F .018X40X7CM

## (undated) DEVICE — DRESSING ADH FILM TELFA 2X3IN

## (undated) DEVICE — SEE MEDLINE ITEM 157117

## (undated) DEVICE — SUT GUT PL. 4-0 27 FS-2

## (undated) DEVICE — KIT INTRO MICRO NIT VSI 4FR

## (undated) DEVICE — SEE MEDLINE ITEM 152622

## (undated) DEVICE — SEE MEDLINE ITEM 156894

## (undated) DEVICE — SUT 3-0 12-18IN SILK

## (undated) DEVICE — KIT POWER PULSE

## (undated) DEVICE — INFLATOR ENCORE 26 BLLN INFL

## (undated) DEVICE — DRESSING TRANS 8X12 TEGADERM

## (undated) DEVICE — SEE MEDLINE ITEM 157173

## (undated) DEVICE — CATH FOGARTY THRU-LUMENARTERIA

## (undated) DEVICE — SUT ETHILON 3-0 PS2 18 BLK

## (undated) DEVICE — NDL HYPO REG 25G X 1 1/2

## (undated) DEVICE — CATH ULTRAVERSE 035 10X60X75

## (undated) DEVICE — ELECTRODE REM PLYHSV RETURN 9

## (undated) DEVICE — SET DECANTER MEDICHOICE

## (undated) DEVICE — ADHESIVE DERMABOND ADVANCED

## (undated) DEVICE — FLOWSWITCH HP 1-W W/O LL

## (undated) DEVICE — AV VASCULAR ACCESS PACK

## (undated) DEVICE — TRAY MINOR GEN SURG

## (undated) DEVICE — KIT PROBE COVER WITH GEL

## (undated) DEVICE — WARMER DRAPE STERILE LF

## (undated) DEVICE — CATH BLLN DORADO 8-4

## (undated) DEVICE — DRESSING TELFA STRL 4X3 LF

## (undated) DEVICE — SUT 2-0 VICRYL / SH (J417)

## (undated) DEVICE — SPONGE DERMACEA GAUZE 4X4

## (undated) DEVICE — LOOP VESSEL BLUE MAXI

## (undated) DEVICE — KIT CO-PILOT

## (undated) DEVICE — DRESSING TRANS 4X4 TEGADERM

## (undated) DEVICE — BLADE SURG CARBON STEEL SZ11

## (undated) DEVICE — GOWN SURGICAL X-LARGE

## (undated) DEVICE — DRESSING TRANS 2X2 TEGADERM

## (undated) DEVICE — DRAPE ABDOMINAL TIBURON 14X11

## (undated) DEVICE — HEMOSTAT SURGICEL 4X8IN

## (undated) DEVICE — GAUZE FLUFF XXLG 36X36 2 PLY

## (undated) DEVICE — SUT LIGACLIP SMALL XTRA

## (undated) DEVICE — CATH ANGIOJET PROXI-90CM

## (undated) DEVICE — SEE MEDLINE ITEM 157187

## (undated) DEVICE — KIT GLIDESHEATH SLEND 6FR 10CM

## (undated) DEVICE — SHEATH PINNACLE 6FR HIFLO

## (undated) DEVICE — SUT 2-0 VICRYL / CT-1

## (undated) DEVICE — CATH STERLING OTW 7X40X135

## (undated) DEVICE — PACK DISP Y TEC

## (undated) DEVICE — CATH DORADO 10X4

## (undated) DEVICE — SUT MCRYL PLUS 4-0 PS2 27IN

## (undated) DEVICE — SPONGE DERMACEA 4X4IN 12PLY

## (undated) DEVICE — SEE MEDLINE ITEM 153688

## (undated) DEVICE — CATH GLIDE ANGLED 5FR 65CM

## (undated) DEVICE — CLIP MED TICALL

## (undated) DEVICE — CANNULA VESSEL

## (undated) DEVICE — GUIDEWIRE ADVNTG 018X180CM ANG

## (undated) DEVICE — SET MICROPUNCT 5FRMPIS-501

## (undated) DEVICE — SUT 2-0 12-18IN SILK

## (undated) DEVICE — DRAPE OPTIMA MAJOR PEDIATRIC

## (undated) DEVICE — SUT PROLENE 2-0 FS

## (undated) DEVICE — TUBING HF INSUFFLATION W/ FLTR

## (undated) DEVICE — TROCAR ENDOPATH XCEL 12MM 10CM

## (undated) DEVICE — SHEATH INTRODUCER 4FR 11CM

## (undated) DEVICE — APPLICATOR CHLORAPREP ORN 26ML

## (undated) DEVICE — BOOT SUTURE AID

## (undated) DEVICE — SUT 4-0 12-18IN SILK BLACK

## (undated) DEVICE — BLLN MUSTANG 7 X 40 X 75

## (undated) DEVICE — SUT 0 VICRYL / UR6 (J603)

## (undated) DEVICE — GUIDEWIRE STF .035X180CM ANG

## (undated) DEVICE — COVER INSTR ELASTIC BAND 40X20

## (undated) DEVICE — SYR ONLY LUER LOCK 20CC

## (undated) DEVICE — OMNIPAQUE 350 200ML

## (undated) DEVICE — GUIDEWIRE MANDRIL .018IN 40CM

## (undated) DEVICE — SUT SILK 2-0 SH 18IN BLACK

## (undated) DEVICE — SET TRANSFER CAPD MINICAP 15CM

## (undated) DEVICE — SEE MEDLINE ITEM 157116

## (undated) DEVICE — SHEATH PINNACLE 6FRX6CM

## (undated) DEVICE — DRAPE PLASTIC U 60X72

## (undated) DEVICE — GLIDE CATH ANGLED 4FR 65CM

## (undated) DEVICE — SUT VICRYL 3-0 27 SH

## (undated) DEVICE — STOCKINET 4INX48

## (undated) DEVICE — COVERS PROBE NR-48 STERILE

## (undated) DEVICE — SHEATH 5FR 6CM

## (undated) DEVICE — CLIP SPRING 6MM

## (undated) DEVICE — SUT PROLENE 6-0 BV-1 30IN

## (undated) DEVICE — SPIKE CONTRAST CONTROLLER

## (undated) DEVICE — SOL NS 1000CC

## (undated) DEVICE — SHEATH PINNACLE 7FR HIFLO

## (undated) DEVICE — COVER LIGHT HANDLE 80/CA

## (undated) DEVICE — GOWN SMART IMP BREATHABLE XXLG

## (undated) DEVICE — SET EXTENSION STAY SAFE LL

## (undated) DEVICE — VISE RADIFOCUS MULTI TORQUE

## (undated) DEVICE — TROCAR ENDOPATH XCEL 5MM 7.5CM

## (undated) DEVICE — INFLATION DEVICE ENCORE 26

## (undated) DEVICE — DRESSING TEGADERM 4.4X5IN

## (undated) DEVICE — CATH DORADO 8X6

## (undated) DEVICE — SEE MEDLINE ITEM 146417

## (undated) DEVICE — BAND TR WITH INFLATOR

## (undated) DEVICE — TROCAR ENDOPATH XCEL 5X100MM

## (undated) DEVICE — SUT VICRYL PLUS 3-0 SH 18IN

## (undated) DEVICE — GAUZE SPONGE 4X4 12PLY

## (undated) DEVICE — Device

## (undated) DEVICE — DRESSING GAUZE 6PLY 4X4

## (undated) DEVICE — SOL MINICAP W/IODINE

## (undated) DEVICE — SET IV ADMIN 15DROP 3 CARESITE

## (undated) DEVICE — PROTECTION STATION PLUS

## (undated) DEVICE — SCISSOR 5MMX35CM DIRECT DRIVE

## (undated) DEVICE — GUIDEWIRE ADVANTAGE .014X300CM

## (undated) DEVICE — CATH ULTRAVERSE 018 5X40X150

## (undated) DEVICE — SHEATH BRITE TIP 7FR 35CM

## (undated) DEVICE — GUIDEWIRE ADVNTG 018X300CM ANG

## (undated) DEVICE — BLLN MUSTANG 12 X 40 X 75